# Patient Record
Sex: FEMALE | Race: OTHER | HISPANIC OR LATINO | Employment: OTHER | URBAN - METROPOLITAN AREA
[De-identification: names, ages, dates, MRNs, and addresses within clinical notes are randomized per-mention and may not be internally consistent; named-entity substitution may affect disease eponyms.]

---

## 2017-01-04 ENCOUNTER — HOSPITAL ENCOUNTER (OUTPATIENT)
Dept: WOUND CARE | Facility: HOSPITAL | Age: 67
Discharge: HOME | End: 2017-01-04
Attending: SURGERY
Payer: MEDICARE

## 2017-01-04 VITALS
TEMPERATURE: 98 F | HEIGHT: 62 IN | WEIGHT: 170 LBS | SYSTOLIC BLOOD PRESSURE: 137 MMHG | BODY MASS INDEX: 31.28 KG/M2 | DIASTOLIC BLOOD PRESSURE: 86 MMHG | HEART RATE: 101 BPM

## 2017-01-04 DIAGNOSIS — T14.8XXA DRAINAGE FROM WOUND: Primary | ICD-10-CM

## 2017-01-04 PROCEDURE — 99213 OFFICE O/P EST LOW 20 MIN: CPT

## 2017-01-04 RX ORDER — ONDANSETRON HYDROCHLORIDE 8 MG/1
8 TABLET, FILM COATED ORAL EVERY 8 HOURS PRN
Qty: 30 TABLET | Refills: 0 | Status: SHIPPED | OUTPATIENT
Start: 2017-01-04 | End: 2017-08-23 | Stop reason: SDUPTHER

## 2017-01-04 NOTE — PROGRESS NOTES
Much of the documentation for this visit was completed in the [facility] system. Please see the attached documentation for further details about this patient's care.     Ulisses Horton MD

## 2017-01-11 ENCOUNTER — HOSPITAL ENCOUNTER (OUTPATIENT)
Dept: WOUND CARE | Facility: HOSPITAL | Age: 67
Discharge: HOME | End: 2017-01-11
Attending: SURGERY
Payer: MEDICARE

## 2017-01-11 VITALS
WEIGHT: 170 LBS | HEIGHT: 62 IN | BODY MASS INDEX: 31.28 KG/M2 | SYSTOLIC BLOOD PRESSURE: 130 MMHG | DIASTOLIC BLOOD PRESSURE: 88 MMHG | HEART RATE: 98 BPM | TEMPERATURE: 97 F

## 2017-01-11 DIAGNOSIS — E11.628 DIABETES WITH SKIN COMPLICATION: ICD-10-CM

## 2017-01-11 DIAGNOSIS — L08.9 UNSPECIFIED LOCAL INFECTION OF SKIN AND SUBCUTANEOUS TISSUE: ICD-10-CM

## 2017-01-11 PROCEDURE — 99213 OFFICE O/P EST LOW 20 MIN: CPT

## 2017-01-11 RX ORDER — HYDROCODONE BITARTRATE AND ACETAMINOPHEN 5; 325 MG/1; MG/1
1 TABLET ORAL EVERY 6 HOURS PRN
Qty: 24 TABLET | Refills: 0 | Status: SHIPPED | OUTPATIENT
Start: 2017-01-11 | End: 2017-01-21

## 2017-01-11 RX ORDER — METHOCARBAMOL 750 MG/1
750 TABLET, FILM COATED ORAL 2 TIMES DAILY
Qty: 60 TABLET | Refills: 0 | Status: SHIPPED | OUTPATIENT
Start: 2017-01-11 | End: 2018-05-30

## 2017-01-18 ENCOUNTER — HOSPITAL ENCOUNTER (OUTPATIENT)
Dept: WOUND CARE | Facility: HOSPITAL | Age: 67
Discharge: HOME | End: 2017-01-18
Attending: SURGERY
Payer: MEDICARE

## 2017-01-18 VITALS
SYSTOLIC BLOOD PRESSURE: 130 MMHG | HEIGHT: 62 IN | BODY MASS INDEX: 31.28 KG/M2 | WEIGHT: 170 LBS | TEMPERATURE: 98 F | DIASTOLIC BLOOD PRESSURE: 79 MMHG | HEART RATE: 97 BPM

## 2017-01-18 DIAGNOSIS — E11.628 DIABETES WITH SKIN COMPLICATION: ICD-10-CM

## 2017-01-18 DIAGNOSIS — L08.9 UNSPECIFIED LOCAL INFECTION OF SKIN AND SUBCUTANEOUS TISSUE: ICD-10-CM

## 2017-01-18 PROCEDURE — 99213 OFFICE O/P EST LOW 20 MIN: CPT

## 2017-02-01 ENCOUNTER — HOSPITAL ENCOUNTER (OUTPATIENT)
Dept: WOUND CARE | Facility: HOSPITAL | Age: 67
Discharge: HOME | End: 2017-02-01
Attending: SURGERY
Payer: MEDICARE

## 2017-02-01 VITALS
TEMPERATURE: 98 F | HEIGHT: 62 IN | HEART RATE: 87 BPM | SYSTOLIC BLOOD PRESSURE: 146 MMHG | BODY MASS INDEX: 31.28 KG/M2 | DIASTOLIC BLOOD PRESSURE: 89 MMHG | WEIGHT: 170 LBS

## 2017-02-01 PROCEDURE — 99213 OFFICE O/P EST LOW 20 MIN: CPT

## 2017-02-01 NOTE — PROGRESS NOTES
Much of the documentation for this visit was completed in the [facility] system. Please see the attached documentation for further details about this patient's care.     Luna Blakely

## 2017-02-08 ENCOUNTER — HOSPITAL ENCOUNTER (OUTPATIENT)
Dept: WOUND CARE | Facility: HOSPITAL | Age: 67
Discharge: HOME | End: 2017-02-08
Attending: SURGERY
Payer: MEDICARE

## 2017-02-08 VITALS — DIASTOLIC BLOOD PRESSURE: 76 MMHG | TEMPERATURE: 98 F | SYSTOLIC BLOOD PRESSURE: 127 MMHG | HEART RATE: 97 BPM

## 2017-02-08 DIAGNOSIS — I10 ESSENTIAL HYPERTENSION: ICD-10-CM

## 2017-02-08 DIAGNOSIS — Z93.3 STATUS POST HARTMANN'S PROCEDURE: ICD-10-CM

## 2017-02-08 DIAGNOSIS — K63.2 ENTERO-ENTERIC FISTULA: ICD-10-CM

## 2017-02-08 DIAGNOSIS — R00.0 TACHYCARDIA: ICD-10-CM

## 2017-02-08 DIAGNOSIS — Z98.890 S/P EXPLORATORY LAPAROTOMY: ICD-10-CM

## 2017-02-08 DIAGNOSIS — Z93.3 S/P COLOSTOMY: ICD-10-CM

## 2017-02-08 DIAGNOSIS — T14.8XXA DRAINAGE FROM WOUND: ICD-10-CM

## 2017-02-08 DIAGNOSIS — T81.89XA SUTURE GRANULOMA, INITIAL ENCOUNTER: ICD-10-CM

## 2017-02-08 DIAGNOSIS — E11.628 DIABETES WITH SKIN COMPLICATION: ICD-10-CM

## 2017-02-08 DIAGNOSIS — E11.9 TYPE 2 DIABETES MELLITUS WITHOUT COMPLICATION, UNSPECIFIED LONG TERM INSULIN USE STATUS: ICD-10-CM

## 2017-02-08 DIAGNOSIS — Z90.49 HISTORY OF HEMICOLECTOMY: Primary | ICD-10-CM

## 2017-02-08 PROCEDURE — 99213 OFFICE O/P EST LOW 20 MIN: CPT

## 2017-02-15 ENCOUNTER — HOSPITAL ENCOUNTER (OUTPATIENT)
Dept: WOUND CARE | Facility: HOSPITAL | Age: 67
Discharge: HOME | End: 2017-02-15
Attending: SURGERY
Payer: MEDICARE

## 2017-02-15 VITALS
HEART RATE: 91 BPM | TEMPERATURE: 98 F | WEIGHT: 170 LBS | HEIGHT: 62 IN | SYSTOLIC BLOOD PRESSURE: 144 MMHG | DIASTOLIC BLOOD PRESSURE: 85 MMHG | BODY MASS INDEX: 31.28 KG/M2

## 2017-02-15 DIAGNOSIS — E11.628 TYPE 2 DIABETES MELLITUS WITH OTHER SKIN COMPLICATION, UNSPECIFIED LONG TERM INSULIN USE STATUS: ICD-10-CM

## 2017-02-15 PROCEDURE — 99213 OFFICE O/P EST LOW 20 MIN: CPT

## 2017-02-20 ENCOUNTER — HOSPITAL ENCOUNTER (OUTPATIENT)
Dept: RADIOLOGY | Facility: HOSPITAL | Age: 67
Discharge: HOME | End: 2017-02-20
Attending: INTERNAL MEDICINE
Payer: MEDICARE

## 2017-02-20 DIAGNOSIS — M71.571: ICD-10-CM

## 2017-02-20 PROCEDURE — 73610 X-RAY EXAM OF ANKLE: CPT | Mod: TC,LT

## 2017-02-20 PROCEDURE — 73610 X-RAY EXAM OF ANKLE: CPT | Mod: 26,LT,, | Performed by: RADIOLOGY

## 2017-03-02 ENCOUNTER — HOSPITAL ENCOUNTER (OUTPATIENT)
Dept: WOUND CARE | Facility: HOSPITAL | Age: 67
Discharge: HOME | End: 2017-03-02
Attending: SURGERY
Payer: MEDICARE

## 2017-03-02 VITALS
SYSTOLIC BLOOD PRESSURE: 149 MMHG | HEIGHT: 62 IN | DIASTOLIC BLOOD PRESSURE: 82 MMHG | TEMPERATURE: 98 F | WEIGHT: 170 LBS | HEART RATE: 86 BPM | BODY MASS INDEX: 31.28 KG/M2

## 2017-03-02 DIAGNOSIS — L08.9 INFECTION OF SKIN AND SUBCUTANEOUS TISSUE: Primary | ICD-10-CM

## 2017-03-02 PROCEDURE — 99213 OFFICE O/P EST LOW 20 MIN: CPT

## 2017-03-06 ENCOUNTER — HOSPITAL ENCOUNTER (OUTPATIENT)
Dept: WOUND CARE | Facility: HOSPITAL | Age: 67
Discharge: HOME | End: 2017-03-06
Attending: SURGERY
Payer: MEDICARE

## 2017-03-06 VITALS
WEIGHT: 170 LBS | HEIGHT: 62 IN | SYSTOLIC BLOOD PRESSURE: 140 MMHG | BODY MASS INDEX: 31.28 KG/M2 | DIASTOLIC BLOOD PRESSURE: 82 MMHG | TEMPERATURE: 98 F | HEART RATE: 95 BPM

## 2017-03-06 DIAGNOSIS — Z98.890 S/P EXPLORATORY LAPAROTOMY: ICD-10-CM

## 2017-03-06 DIAGNOSIS — K63.2 ENTERO-ENTERIC FISTULA: ICD-10-CM

## 2017-03-06 DIAGNOSIS — I10 ESSENTIAL HYPERTENSION: ICD-10-CM

## 2017-03-06 DIAGNOSIS — Z93.3 STATUS POST HARTMANN'S PROCEDURE: ICD-10-CM

## 2017-03-06 DIAGNOSIS — R00.0 TACHYCARDIA: Primary | ICD-10-CM

## 2017-03-06 DIAGNOSIS — E11.9 TYPE 2 DIABETES MELLITUS WITHOUT COMPLICATION, UNSPECIFIED LONG TERM INSULIN USE STATUS: ICD-10-CM

## 2017-03-06 DIAGNOSIS — T14.8XXA DRAINAGE FROM WOUND: ICD-10-CM

## 2017-03-06 DIAGNOSIS — T81.89XA SUTURE GRANULOMA, INITIAL ENCOUNTER: ICD-10-CM

## 2017-03-06 DIAGNOSIS — Z90.49 HISTORY OF HEMICOLECTOMY: ICD-10-CM

## 2017-03-06 DIAGNOSIS — Z93.3 S/P COLOSTOMY: ICD-10-CM

## 2017-03-06 PROCEDURE — 87070 CULTURE OTHR SPECIMN AEROBIC: CPT

## 2017-03-06 PROCEDURE — 87075 CULTR BACTERIA EXCEPT BLOOD: CPT

## 2017-03-06 PROCEDURE — 99213 OFFICE O/P EST LOW 20 MIN: CPT

## 2017-03-06 NOTE — PATIENT INSTRUCTIONS
Clean wound with saline, wipe with gauze, apply strip of Cutamed Sorbact to wound, cover with border dressing, every other day. If needed apply small piece gauze over wound under border dressing.

## 2017-03-08 LAB — BACTERIA SPEC AEROBE CULT: NORMAL

## 2017-03-10 LAB — BACTERIA SPEC ANAEROBE CULT: NORMAL

## 2017-06-29 ENCOUNTER — HOSPITAL ENCOUNTER (OUTPATIENT)
Dept: WOUND CARE | Facility: HOSPITAL | Age: 67
Discharge: HOME | End: 2017-06-29
Attending: SURGERY
Payer: MEDICARE

## 2017-06-29 VITALS
WEIGHT: 170 LBS | TEMPERATURE: 98 F | SYSTOLIC BLOOD PRESSURE: 181 MMHG | HEIGHT: 62 IN | HEART RATE: 94 BPM | BODY MASS INDEX: 31.28 KG/M2 | DIASTOLIC BLOOD PRESSURE: 104 MMHG

## 2017-06-29 DIAGNOSIS — R00.0 TACHYCARDIA: Primary | ICD-10-CM

## 2017-06-29 DIAGNOSIS — Z98.890 S/P EXPLORATORY LAPAROTOMY: ICD-10-CM

## 2017-06-29 DIAGNOSIS — I10 ESSENTIAL HYPERTENSION: ICD-10-CM

## 2017-06-29 DIAGNOSIS — Z93.3 STATUS POST HARTMANN'S PROCEDURE: ICD-10-CM

## 2017-06-29 DIAGNOSIS — Z90.49 HISTORY OF HEMICOLECTOMY: ICD-10-CM

## 2017-06-29 DIAGNOSIS — K63.2 ENTERO-ENTERIC FISTULA: ICD-10-CM

## 2017-06-29 DIAGNOSIS — T14.8XXA DRAINAGE FROM WOUND: ICD-10-CM

## 2017-06-29 PROCEDURE — 87186 SC STD MICRODIL/AGAR DIL: CPT

## 2017-06-29 PROCEDURE — 87070 CULTURE OTHR SPECIMN AEROBIC: CPT

## 2017-06-29 PROCEDURE — 99214 OFFICE O/P EST MOD 30 MIN: CPT

## 2017-06-29 PROCEDURE — 87077 CULTURE AEROBIC IDENTIFY: CPT

## 2017-06-29 RX ORDER — CLINDAMYCIN HYDROCHLORIDE 300 MG/1
300 CAPSULE ORAL 3 TIMES DAILY
Qty: 60 CAPSULE | Refills: 1 | Status: SHIPPED | OUTPATIENT
Start: 2017-06-29 | End: 2017-08-30 | Stop reason: ALTCHOICE

## 2017-06-29 NOTE — PROGRESS NOTES
Much of the documentation for this visit was completed in the [facility] system. Please see the attached documentation for further details about this patient's care.     Fabiana Quintero RN

## 2017-07-03 LAB
BACTERIA SPEC AEROBE CULT: NORMAL
BACTERIA SPEC AEROBE CULT: NORMAL

## 2017-07-06 ENCOUNTER — HOSPITAL ENCOUNTER (OUTPATIENT)
Dept: WOUND CARE | Facility: HOSPITAL | Age: 67
Discharge: HOME | End: 2017-07-06
Attending: SURGERY
Payer: MEDICARE

## 2017-07-06 VITALS — SYSTOLIC BLOOD PRESSURE: 187 MMHG | HEART RATE: 85 BPM | TEMPERATURE: 98 F | DIASTOLIC BLOOD PRESSURE: 99 MMHG

## 2017-07-06 DIAGNOSIS — E11.9 TYPE 2 DIABETES MELLITUS WITHOUT COMPLICATION, UNSPECIFIED LONG TERM INSULIN USE STATUS: ICD-10-CM

## 2017-07-06 DIAGNOSIS — I10 ESSENTIAL HYPERTENSION: ICD-10-CM

## 2017-07-06 DIAGNOSIS — T81.89XA SUTURE GRANULOMA, INITIAL ENCOUNTER: Primary | ICD-10-CM

## 2017-07-06 DIAGNOSIS — L02.211 ABSCESS OF ABDOMINAL WALL: ICD-10-CM

## 2017-07-06 DIAGNOSIS — T14.8XXA DRAINAGE FROM WOUND: ICD-10-CM

## 2017-07-06 DIAGNOSIS — Z93.3 S/P COLOSTOMY: ICD-10-CM

## 2017-07-06 PROCEDURE — 87077 CULTURE AEROBIC IDENTIFY: CPT

## 2017-07-06 PROCEDURE — 87070 CULTURE OTHR SPECIMN AEROBIC: CPT

## 2017-07-06 PROCEDURE — 87075 CULTR BACTERIA EXCEPT BLOOD: CPT

## 2017-07-06 PROCEDURE — 99214 OFFICE O/P EST MOD 30 MIN: CPT

## 2017-07-06 PROCEDURE — 87186 SC STD MICRODIL/AGAR DIL: CPT | Mod: 59

## 2017-07-06 PROCEDURE — 87076 CULTURE ANAEROBE IDENT EACH: CPT

## 2017-07-06 RX ORDER — SODIUM CHLORIDE 9 MG/ML
INJECTION, SOLUTION INTRAVENOUS CONTINUOUS
Status: CANCELLED | OUTPATIENT
Start: 2017-07-06

## 2017-07-06 RX ORDER — CEFAZOLIN SODIUM 2 G/50ML
2 SOLUTION INTRAVENOUS
Status: CANCELLED | OUTPATIENT
Start: 2017-07-06

## 2017-07-06 NOTE — PROGRESS NOTES
Today`s Date: 2017     Admit Date: 2017    Admitting Physician: Ulisses Horton MD    Patient`s Name: Azucena Morrison , 67 y.o. female    HISTORY AND CHIEF COMPLAINT  C/o painful tender swelling lower abdominal wall with drainage  With mild fever , no nausea or vomiting, moving or diarrhea  Patient Active Problem List   Diagnosis    History of hemicolectomy    Status post Aiyana's procedure    HTN (hypertension)    Type 2 diabetes mellitus without complication    Suture granuloma    Essential hypertension    Drainage from wound    S/P colostomy    Entero-enteric fistula    S/P exploratory laparotomy    Tachycardia       Past Medical History:   Diagnosis Date    Diabetes mellitus     Hyperlipemia     Hypertension        Past Surgical History:   Procedure Laterality Date     SECTION, CLASSIC      CHOLECYSTECTOMY      COLON SURGERY      \[facility]\""    COLOSTOMY Left     INCISIONAL HERNIA REPAIR Right 2010       Prior to Admission medications    Medication Sig Start Date End Date Taking? Authorizing Provider   acetaminophen (TYLENOL) 325 MG tablet Take 2 tablets (650 mg total) by mouth every 8 (eight) hours as needed. 16   Ulisses Horton MD   amlodipine (NORVASC) 10 MG tablet  16   Historical Provider, MD   clindamycin (CLEOCIN) 300 MG capsule Take 1 capsule (300 mg total) by mouth 3 (three) times daily. 17   Ulisses Horton MD   glyBURIDE (DIABETA) 5 MG tablet  16   Historical Provider, MD   metformin (GLUCOPHAGE) 1000 MG tablet  16   Historical Provider, MD   metformin (GLUCOPHAGE) 500 MG tablet Take 1,000 mg by mouth 2 (two) times daily with meals.    Historical Provider, MD   methocarbamol (ROBAXIN) 750 MG Tab Take 1 tablet (750 mg total) by mouth 2 (two) times daily. 17   Ulisses Horton MD   mupirocin (BACTROBAN) 2 % ointment  3/29/16   Historical Provider, MD   ondansetron (ZOFRAN) 8 MG tablet Take 1 tablet (8 mg  total) by mouth every 8 (eight) hours as needed for Nausea. 1/4/17   Ulisses Horton MD   pravastatin (PRAVACHOL) 20 MG tablet Take 1 tablet (20 mg total) by mouth once daily. 11/28/16 11/28/17  Ulisses Horton MD   TRUEPLUS LANCETS 26 gauge Misc  4/7/16   Historical Provider, MD   TRUETEST TEST STRIPS Strp  4/7/16   Historical Provider, MD   zolpidem (AMBIEN) 5 MG Tab Take 1 tablet (5 mg total) by mouth nightly as needed. 12/21/16 6/29/17  Ulisses Horton MD     Current Outpatient Prescriptions on File Prior to Encounter   Medication Sig Dispense Refill    acetaminophen (TYLENOL) 325 MG tablet Take 2 tablets (650 mg total) by mouth every 8 (eight) hours as needed.  0    amlodipine (NORVASC) 10 MG tablet       clindamycin (CLEOCIN) 300 MG capsule Take 1 capsule (300 mg total) by mouth 3 (three) times daily. 60 capsule 1    glyBURIDE (DIABETA) 5 MG tablet       metformin (GLUCOPHAGE) 1000 MG tablet       metformin (GLUCOPHAGE) 500 MG tablet Take 1,000 mg by mouth 2 (two) times daily with meals.      methocarbamol (ROBAXIN) 750 MG Tab Take 1 tablet (750 mg total) by mouth 2 (two) times daily. 60 tablet 0    mupirocin (BACTROBAN) 2 % ointment       ondansetron (ZOFRAN) 8 MG tablet Take 1 tablet (8 mg total) by mouth every 8 (eight) hours as needed for Nausea. 30 tablet 0    pravastatin (PRAVACHOL) 20 MG tablet Take 1 tablet (20 mg total) by mouth once daily. 90 tablet 3    TRUEPLUS LANCETS 26 gauge Misc       TRUETEST TEST STRIPS Strp       zolpidem (AMBIEN) 5 MG Tab Take 1 tablet (5 mg total) by mouth nightly as needed. 30 tablet 0     No current facility-administered medications on file prior to encounter.         Review of patient's allergies indicates:   Allergen Reactions    Chlorhexidine gluconate Rash     Chlorhexidine/alcohol wipes. Rash and blistering.       Social History:   reports that she has never smoked. She has never used smokeless tobacco. She reports that she does not  drink alcohol or use drugs.     No family history on file.    PHYSICAL EXAMINATION  Temp:  [97.7 °F (36.5 °C)] 97.7 °F (36.5 °C)  Pulse:  [85] 85  BP: (187)/(99) 187/99    General Condition:   alert x 3    Head & Neck  Anemia: None  Jaundice: None  Neck vein: Not distended  Carotid Bruits: none  Lymph nodes: none palpable  Thyroid: normal    Chest: normal    Heart: normal    Rt. Breast: not examined  Lt. Breast: not examined  Axillary lymph nodes: none    Abdomen: Soft,  None tender with no palpable mass or organ  Hernia: none    Rectal: Defered    Extremities: normal    Vascular: normal    Specific focus Examination    Imp: abdominal wall abscess, dm , htn, s/p colon resection    Plan: excision and debridement abdominal wound.

## 2017-07-08 LAB — BACTERIA SPEC AEROBE CULT: NORMAL

## 2017-07-10 LAB — BACTERIA SPEC ANAEROBE CULT: NORMAL

## 2017-07-11 ENCOUNTER — HOSPITAL ENCOUNTER (OUTPATIENT)
Facility: HOSPITAL | Age: 67
Discharge: HOME | End: 2017-07-11
Attending: SURGERY | Admitting: SURGERY
Payer: MEDICARE

## 2017-07-11 ENCOUNTER — ANESTHESIA EVENT (OUTPATIENT)
Dept: SURGERY | Facility: HOSPITAL | Age: 67
End: 2017-07-11
Payer: MEDICARE

## 2017-07-11 ENCOUNTER — ANESTHESIA (OUTPATIENT)
Dept: SURGERY | Facility: HOSPITAL | Age: 67
End: 2017-07-11
Payer: MEDICARE

## 2017-07-11 VITALS
OXYGEN SATURATION: 98 % | HEIGHT: 62 IN | SYSTOLIC BLOOD PRESSURE: 140 MMHG | RESPIRATION RATE: 16 BRPM | DIASTOLIC BLOOD PRESSURE: 77 MMHG | TEMPERATURE: 98 F | WEIGHT: 169 LBS | BODY MASS INDEX: 31.1 KG/M2 | HEART RATE: 88 BPM

## 2017-07-11 VITALS — HEART RATE: 85 BPM | DIASTOLIC BLOOD PRESSURE: 76 MMHG | OXYGEN SATURATION: 100 % | SYSTOLIC BLOOD PRESSURE: 147 MMHG

## 2017-07-11 DIAGNOSIS — Z90.49 HISTORY OF HEMICOLECTOMY: Primary | ICD-10-CM

## 2017-07-11 DIAGNOSIS — L02.211 ABSCESS OF ABDOMINAL WALL: ICD-10-CM

## 2017-07-11 DIAGNOSIS — I10 HYPERTENSION: ICD-10-CM

## 2017-07-11 DIAGNOSIS — I10 ESSENTIAL HYPERTENSION: ICD-10-CM

## 2017-07-11 DIAGNOSIS — Z93.3 STATUS POST HARTMANN'S PROCEDURE: ICD-10-CM

## 2017-07-11 LAB
ANION GAP SERPL CALC-SCNC: 8 MMOL/L
BASOPHILS # BLD AUTO: 0.02 K/UL
BASOPHILS NFR BLD: 0.2 %
BUN SERPL-MCNC: 13 MG/DL
CALCIUM SERPL-MCNC: 9.6 MG/DL
CHLORIDE SERPL-SCNC: 101 MMOL/L
CO2 SERPL-SCNC: 27 MMOL/L
CREAT SERPL-MCNC: 0.9 MG/DL
DIFFERENTIAL METHOD: ABNORMAL
EOSINOPHIL # BLD AUTO: 0.3 K/UL
EOSINOPHIL NFR BLD: 2.7 %
ERYTHROCYTE [DISTWIDTH] IN BLOOD BY AUTOMATED COUNT: 21.6 %
EST. GFR  (AFRICAN AMERICAN): >60 ML/MIN/1.73 M^2
EST. GFR  (NON AFRICAN AMERICAN): >60 ML/MIN/1.73 M^2
GLUCOSE SERPL-MCNC: 128 MG/DL
HCT VFR BLD AUTO: 35.1 %
HGB BLD-MCNC: 11.3 G/DL
LYMPHOCYTES # BLD AUTO: 2.3 K/UL
LYMPHOCYTES NFR BLD: 19.5 %
MCH RBC QN AUTO: 23.3 PG
MCHC RBC AUTO-ENTMCNC: 32.2 %
MCV RBC AUTO: 72 FL
MONOCYTES # BLD AUTO: 0.4 K/UL
MONOCYTES NFR BLD: 3.8 %
NEUTROPHILS # BLD AUTO: 8.5 K/UL
NEUTROPHILS NFR BLD: 73.5 %
PLATELET # BLD AUTO: 393 K/UL
PMV BLD AUTO: 8 FL
POTASSIUM SERPL-SCNC: 4.5 MMOL/L
RBC # BLD AUTO: 4.85 M/UL
SODIUM SERPL-SCNC: 136 MMOL/L
WBC # BLD AUTO: 11.53 K/UL

## 2017-07-11 PROCEDURE — 87102 FUNGUS ISOLATION CULTURE: CPT

## 2017-07-11 PROCEDURE — 87116 MYCOBACTERIA CULTURE: CPT

## 2017-07-11 PROCEDURE — 87077 CULTURE AEROBIC IDENTIFY: CPT

## 2017-07-11 PROCEDURE — 36000707: Performed by: SURGERY

## 2017-07-11 PROCEDURE — 36415 COLL VENOUS BLD VENIPUNCTURE: CPT

## 2017-07-11 PROCEDURE — 71000016 HC POSTOP RECOV ADDL HR: Performed by: SURGERY

## 2017-07-11 PROCEDURE — 93010 ELECTROCARDIOGRAM REPORT: CPT | Mod: ,,, | Performed by: INTERNAL MEDICINE

## 2017-07-11 PROCEDURE — 80048 BASIC METABOLIC PNL TOTAL CA: CPT

## 2017-07-11 PROCEDURE — 93005 ELECTROCARDIOGRAM TRACING: CPT

## 2017-07-11 PROCEDURE — 37000009 HC ANESTHESIA EA ADD 15 MINS: Performed by: SURGERY

## 2017-07-11 PROCEDURE — 87186 SC STD MICRODIL/AGAR DIL: CPT | Mod: 59

## 2017-07-11 PROCEDURE — 87205 SMEAR GRAM STAIN: CPT

## 2017-07-11 PROCEDURE — 63600175 PHARM REV CODE 636 W HCPCS: Performed by: NURSE ANESTHETIST, CERTIFIED REGISTERED

## 2017-07-11 PROCEDURE — 85025 COMPLETE CBC W/AUTO DIFF WBC: CPT

## 2017-07-11 PROCEDURE — 25000003 PHARM REV CODE 250: Performed by: NURSE ANESTHETIST, CERTIFIED REGISTERED

## 2017-07-11 PROCEDURE — 87070 CULTURE OTHR SPECIMN AEROBIC: CPT

## 2017-07-11 PROCEDURE — 87075 CULTR BACTERIA EXCEPT BLOOD: CPT

## 2017-07-11 PROCEDURE — 25000003 PHARM REV CODE 250: Performed by: ANESTHESIOLOGY

## 2017-07-11 PROCEDURE — 25000003 PHARM REV CODE 250: Performed by: SURGERY

## 2017-07-11 PROCEDURE — 37000008 HC ANESTHESIA 1ST 15 MINUTES: Performed by: SURGERY

## 2017-07-11 PROCEDURE — 71000015 HC POSTOP RECOV 1ST HR: Performed by: SURGERY

## 2017-07-11 PROCEDURE — 36000706: Performed by: SURGERY

## 2017-07-11 RX ORDER — MIDAZOLAM HYDROCHLORIDE 1 MG/ML
INJECTION INTRAMUSCULAR; INTRAVENOUS
Status: DISCONTINUED | OUTPATIENT
Start: 2017-07-11 | End: 2017-07-11

## 2017-07-11 RX ORDER — ACETAMINOPHEN 325 MG/1
650 TABLET ORAL EVERY 4 HOURS PRN
Status: DISCONTINUED | OUTPATIENT
Start: 2017-07-11 | End: 2017-07-11 | Stop reason: HOSPADM

## 2017-07-11 RX ORDER — BACITRACIN 50000 [IU]/1
INJECTION, POWDER, FOR SOLUTION INTRAMUSCULAR
Status: DISCONTINUED | OUTPATIENT
Start: 2017-07-11 | End: 2017-07-11 | Stop reason: HOSPADM

## 2017-07-11 RX ORDER — OXYCODONE HYDROCHLORIDE 5 MG/1
5 TABLET ORAL
Status: DISCONTINUED | OUTPATIENT
Start: 2017-07-11 | End: 2017-07-11 | Stop reason: HOSPADM

## 2017-07-11 RX ORDER — HYDROCODONE BITARTRATE AND ACETAMINOPHEN 5; 325 MG/1; MG/1
1 TABLET ORAL EVERY 4 HOURS PRN
Qty: 32 TABLET | Refills: 0 | Status: SHIPPED | OUTPATIENT
Start: 2017-07-11 | End: 2017-07-19 | Stop reason: SDUPTHER

## 2017-07-11 RX ORDER — HYDROCODONE BITARTRATE AND ACETAMINOPHEN 5; 325 MG/1; MG/1
1 TABLET ORAL EVERY 4 HOURS PRN
Status: DISCONTINUED | OUTPATIENT
Start: 2017-07-11 | End: 2017-07-11 | Stop reason: HOSPADM

## 2017-07-11 RX ORDER — FENTANYL CITRATE 50 UG/ML
INJECTION, SOLUTION INTRAMUSCULAR; INTRAVENOUS
Status: DISCONTINUED | OUTPATIENT
Start: 2017-07-11 | End: 2017-07-11

## 2017-07-11 RX ORDER — ONDANSETRON 2 MG/ML
4 INJECTION INTRAMUSCULAR; INTRAVENOUS DAILY PRN
Status: DISCONTINUED | OUTPATIENT
Start: 2017-07-11 | End: 2017-07-11 | Stop reason: HOSPADM

## 2017-07-11 RX ORDER — DIPHENHYDRAMINE HYDROCHLORIDE 50 MG/ML
25 INJECTION INTRAMUSCULAR; INTRAVENOUS EVERY 6 HOURS PRN
Status: DISCONTINUED | OUTPATIENT
Start: 2017-07-11 | End: 2017-07-11 | Stop reason: HOSPADM

## 2017-07-11 RX ORDER — CEFAZOLIN SODIUM 2 G/50ML
2 SOLUTION INTRAVENOUS
Status: DISCONTINUED | OUTPATIENT
Start: 2017-07-11 | End: 2017-07-11 | Stop reason: HOSPADM

## 2017-07-11 RX ORDER — HYDROMORPHONE HYDROCHLORIDE 2 MG/ML
0.5 INJECTION, SOLUTION INTRAMUSCULAR; INTRAVENOUS; SUBCUTANEOUS EVERY 5 MIN PRN
Status: DISCONTINUED | OUTPATIENT
Start: 2017-07-11 | End: 2017-07-11 | Stop reason: HOSPADM

## 2017-07-11 RX ORDER — LISINOPRIL 10 MG/1
10 TABLET ORAL DAILY
COMMUNITY
End: 2018-01-03 | Stop reason: ALTCHOICE

## 2017-07-11 RX ORDER — PROPOFOL 10 MG/ML
VIAL (ML) INTRAVENOUS CONTINUOUS PRN
Status: DISCONTINUED | OUTPATIENT
Start: 2017-07-11 | End: 2017-07-11

## 2017-07-11 RX ORDER — LISINOPRIL 10 MG/1
10 TABLET ORAL ONCE
Status: COMPLETED | OUTPATIENT
Start: 2017-07-11 | End: 2017-07-11

## 2017-07-11 RX ORDER — LIDOCAINE HYDROCHLORIDE 10 MG/ML
INJECTION, SOLUTION EPIDURAL; INFILTRATION; INTRACAUDAL; PERINEURAL
Status: DISCONTINUED | OUTPATIENT
Start: 2017-07-11 | End: 2017-07-11 | Stop reason: HOSPADM

## 2017-07-11 RX ORDER — SODIUM CHLORIDE 0.9 % (FLUSH) 0.9 %
3 SYRINGE (ML) INJECTION
Status: DISCONTINUED | OUTPATIENT
Start: 2017-07-11 | End: 2017-07-11 | Stop reason: HOSPADM

## 2017-07-11 RX ORDER — LIDOCAINE HCL/PF 100 MG/5ML
SYRINGE (ML) INTRAVENOUS
Status: DISCONTINUED | OUTPATIENT
Start: 2017-07-11 | End: 2017-07-11

## 2017-07-11 RX ORDER — SODIUM CHLORIDE, SODIUM LACTATE, POTASSIUM CHLORIDE, CALCIUM CHLORIDE 600; 310; 30; 20 MG/100ML; MG/100ML; MG/100ML; MG/100ML
INJECTION, SOLUTION INTRAVENOUS CONTINUOUS PRN
Status: DISCONTINUED | OUTPATIENT
Start: 2017-07-11 | End: 2017-07-11

## 2017-07-11 RX ORDER — HYDROCODONE BITARTRATE AND ACETAMINOPHEN 10; 325 MG/1; MG/1
1 TABLET ORAL EVERY 4 HOURS PRN
Status: DISCONTINUED | OUTPATIENT
Start: 2017-07-11 | End: 2017-07-11 | Stop reason: HOSPADM

## 2017-07-11 RX ORDER — SODIUM CHLORIDE 9 MG/ML
INJECTION, SOLUTION INTRAVENOUS CONTINUOUS
Status: DISCONTINUED | OUTPATIENT
Start: 2017-07-11 | End: 2017-07-11 | Stop reason: HOSPADM

## 2017-07-11 RX ORDER — ZOLPIDEM TARTRATE 5 MG/1
5 TABLET ORAL NIGHTLY PRN
Status: DISCONTINUED | OUTPATIENT
Start: 2017-07-11 | End: 2017-07-11 | Stop reason: HOSPADM

## 2017-07-11 RX ADMIN — MIDAZOLAM HYDROCHLORIDE 2 MG: 1 INJECTION, SOLUTION INTRAMUSCULAR; INTRAVENOUS at 01:07

## 2017-07-11 RX ADMIN — PROPOFOL 125 MCG/KG/MIN: 10 INJECTION, EMULSION INTRAVENOUS at 02:07

## 2017-07-11 RX ADMIN — DEXTROSE 2 G: 50 INJECTION, SOLUTION INTRAVENOUS at 02:07

## 2017-07-11 RX ADMIN — FENTANYL CITRATE 50 MCG: 50 INJECTION, SOLUTION INTRAMUSCULAR; INTRAVENOUS at 02:07

## 2017-07-11 RX ADMIN — LIDOCAINE HYDROCHLORIDE 50 MG: 20 INJECTION, SOLUTION INTRAVENOUS at 02:07

## 2017-07-11 RX ADMIN — LISINOPRIL 10 MG: 10 TABLET ORAL at 11:07

## 2017-07-11 RX ADMIN — SODIUM CHLORIDE, SODIUM LACTATE, POTASSIUM CHLORIDE, AND CALCIUM CHLORIDE: .6; .31; .03; .02 INJECTION, SOLUTION INTRAVENOUS at 01:07

## 2017-07-11 NOTE — TRANSFER OF CARE
"Anesthesia Transfer of Care Note    Patient: Azucena Morrison    Procedure(s) Performed: Procedure(s) (LRB):  DEBRIDEMENT-WOUND-ABDOMINAL WALL (N/A)    Patient location: OPS    Anesthesia Type: MAC    Transport from OR: Transported from OR on room air with adequate spontaneous ventilation    Post pain: adequate analgesia    Post assessment: no apparent anesthetic complications    Post vital signs: stable    Level of consciousness: awake    Nausea/Vomiting: no nausea/vomiting    Complications: none          Last vitals:   Visit Vitals  BP (!) (P) 174/96 (BP Location: Right arm)   Pulse 85   Temp 36.6 °C (97.9 °F) (Oral)   Resp 18   Ht 5' 2" (1.575 m)   Wt 76.7 kg (169 lb)   SpO2 99%   BMI 30.91 kg/m²     "

## 2017-07-11 NOTE — ANESTHESIA PREPROCEDURE EVALUATION
2017  Azucena Morrison is a 67 y.o., femalet of an abdominal wound    Review of patient's allergies indicates:   Allergen Reactions    Chlorhexidine gluconate Rash     Chlorhexidine/alcohol wipes. Rash and blistering.     Patient Active Problem List   Diagnosis    History of hemicolectomy    Status post Aiyana's procedure    HTN (hypertension)    Type 2 diabetes mellitus without complication    Suture granuloma    Essential hypertension    Drainage from wound    S/P colostomy    Entero-enteric fistula    S/P exploratory laparotomy    Tachycardia    Abscess of abdominal wall     Past Medical History:   Diagnosis Date    Diabetes mellitus     Hyperlipemia     Hypertension      Past Surgical History:   Procedure Laterality Date     SECTION, CLASSIC      CHOLECYSTECTOMY      [facility]    COLOSTOMY Left 2015    INCISIONAL HERNIA REPAIR Right 2010         Anesthesia Evaluation         Review of Systems    Wt Readings from Last 3 Encounters:   17 76.7 kg (169 lb)   17 77.1 kg (170 lb)   17 77.1 kg (170 lb)     Temp Readings from Last 3 Encounters:   17 36.6 °C (97.9 °F) (Oral)   17 36.5 °C (97.7 °F)   17 36.8 °C (98.3 °F) (Oral)     BP Readings from Last 3 Encounters:   17 (!) 173/105   17 (!) 187/99   17 (!) 181/104     Pulse Readings from Last 3 Encounters:   17 85   17 85   17 94     Lab Results   Component Value Date    WBC 12.25 2016    HGB 10.0 (L) 2016    HCT 30.8 (L) 2016    MCV 82 2016     (H) 2016       Chemistry        Component Value Date/Time     2016 1250    K 4.1 2016 1250     2016 1250    CO2 23 2016 1250    BUN 10 2016 1250    CREATININE 0.9 2016 1250     (H) 2016 1250         Component Value Date/Time    CALCIUM 9.6 12/07/2016 1250    ALKPHOS 58 11/16/2016 2044    AST 13 11/16/2016 2044    ALT 6 (L) 11/16/2016 2044    BILITOT 0.6 11/16/2016 2044    ESTGFRAFRICA >60 12/07/2016 1250    EGFRNONAA >60 12/07/2016 1250            Physical Exam  General:  Well nourished, Obesity    Airway/Jaw/Neck:  Airway Findings: Mouth Opening: Normal Tongue: Normal  General Airway Assessment: Adult  Mallampati: II  Improves to II with phonation.  TM Distance: Normal, at least 6 cm       Chest/Lungs:  Chest/Lungs Findings: Clear to auscultation, Normal Respiratory Rate     Heart/Vascular:  Heart Findings: Rate: Normal  Rhythm: Regular Rhythm  Sounds: Normal        Mental Status:  Mental Status Findings:  Cooperative, Alert and Oriented         Anesthesia Plan  Type of Anesthesia, risks & benefits discussed:  Anesthesia Type:  general  Patient's Preference:   Intra-op Monitoring Plan:   Intra-op Monitoring Plan Comments:   Post Op Pain Control Plan:   Post Op Pain Control Plan Comments:   Induction:   IV  Beta Blocker:  Patient is not currently on a Beta-Blocker (No further documentation required).       Informed Consent: Patient understands risks and agrees with Anesthesia plan.  Questions answered. Anesthesia consent signed with patient.  ASA Score: 3     Day of Surgery Review of History & Physical: I have interviewed and examined the patient. I have reviewed the patient's H&P dated:  There are no significant changes.  H&P update referred to the provider.  H&P completed by Anesthesiologist.       Ready For Surgery From Anesthesia Perspective.

## 2017-07-11 NOTE — BRIEF OP NOTE
[facility]  Brief Operative Note     SUMMARY     Surgery Date: 7/11/2017     Surgeon(s) and Role:     * Ulisses Horton MD - Primary    Assisting Surgeon: None    Pre-op Diagnosis:  Abscess of abdominal wall [L02.211]  DM (diabetes mellitus) type I uncontrolled, periph vascular disorder [E10.51, E10.65]  Essential hypertension [I10]    Post-op Diagnosis:  Post-Op Diagnosis Codes:     * Abscess of abdominal wall [L02.211]     * DM (diabetes mellitus) type I uncontrolled, periph vascular disorder [E10.51, E10.65]     * Essential hypertension [I10]    Procedure(s) (LRB):  DEBRIDEMENT-WOUND-ABDOMINAL WALL (N/A)    Anesthesia: General    Description of the findings of the procedure: excision and debridement abdominal wound    Findings/Key Components:   Abscess abdominal; wall  Estimated Blood Loss: 50 cc      Specimens:   Specimen (12h ago through future)    None          Discharge Note    SUMMARY     Admit Date: 7/11/2017    Discharge Date and Time:  07/11/2017 2:23 PM    Hospital Course (synopsis of major diagnoses, care, treatment, and services provided during the course of the hospital stay): patient tolerated procedure well amd was snet to reconvery room in stable cond.     Final Diagnosis: Post-Op Diagnosis Codes:     * Abscess of abdominal wall [L02.211]     * DM (diabetes mellitus) type I uncontrolled, periph vascular disorder [E10.51, E10.65]     * Essential hypertension [I10]    Disposition: Home or Self Care    Follow Up/Patient Instructions:     Medications:  Reconciled Home Medications:   Current Discharge Medication List      START taking these medications    Details   hydrocodone-acetaminophen 5-325mg (NORCO) 5-325 mg per tablet Take 1 tablet by mouth every 4 (four) hours as needed for Pain.  Qty: 32 tablet, Refills: 0         CONTINUE these medications which have NOT CHANGED    Details   acetaminophen (TYLENOL) 325 MG tablet Take 2 tablets (650 mg total) by mouth every 8 (eight)  hours as needed.  Refills: 0      clindamycin (CLEOCIN) 300 MG capsule Take 1 capsule (300 mg total) by mouth 3 (three) times daily.  Qty: 60 capsule, Refills: 1      glyBURIDE (DIABETA) 5 MG tablet       lisinopril 10 MG tablet Take 10 mg by mouth once daily.      !! metformin (GLUCOPHAGE) 1000 MG tablet       !! metformin (GLUCOPHAGE) 500 MG tablet Take 1,000 mg by mouth 2 (two) times daily with meals.      methocarbamol (ROBAXIN) 750 MG Tab Take 1 tablet (750 mg total) by mouth 2 (two) times daily.  Qty: 60 tablet, Refills: 0      mupirocin (BACTROBAN) 2 % ointment       ondansetron (ZOFRAN) 8 MG tablet Take 1 tablet (8 mg total) by mouth every 8 (eight) hours as needed for Nausea.  Qty: 30 tablet, Refills: 0      pravastatin (PRAVACHOL) 20 MG tablet Take 1 tablet (20 mg total) by mouth once daily.  Qty: 90 tablet, Refills: 3      TRUEPLUS LANCETS 26 gauge Misc       TRUETEST TEST STRIPS Strp       zolpidem (AMBIEN) 5 MG Tab Take 1 tablet (5 mg total) by mouth nightly as needed.  Qty: 30 tablet, Refills: 0       !! - Potential duplicate medications found. Please discuss with provider.          Discharge Procedure Orders  Diet general     Activity as tolerated     Keep surgical extremity elevated     Lifting restrictions     Call MD for:  temperature >100.4     Call MD for:  persistent nausea and vomiting     Call MD for:  severe uncontrolled pain     Call MD for:  redness, tenderness, or signs of infection (pain, swelling, redness, odor or green/yellow discharge around incision site)     Leave dressing on - Keep it clean, dry, and intact until clinic visit       Follow-up Information     FORD DAMIAN M.D. In 2 days.    Contact information:  [address] [facility] [address]  [phone]

## 2017-07-11 NOTE — PLAN OF CARE
Discharge instructions reviewed with pt. Pt verbalizes understanding. PIV discontinued as per md order. Tip intact. Pt tolerated well. Pt discharged to family via wheelchair

## 2017-07-11 NOTE — PLAN OF CARE
Pt returned to via stretcher,. Pt sleeping but arouses to voice. Respirations even and unlabored. vss (see flow sheet). Dressings to abdomen c/d/i. Pt denies pain

## 2017-07-11 NOTE — ANESTHESIA POSTPROCEDURE EVALUATION
"Anesthesia Post Evaluation    Patient: Azucena Morrison    Procedure(s) Performed: Procedure(s) (LRB):  DEBRIDEMENT-WOUND-ABDOMINAL WALL (N/A)    Final Anesthesia Type: MAC  Patient location during evaluation: OPS  Patient participation: Yes- Able to Participate  Level of consciousness: awake and alert  Post-procedure vital signs: reviewed and stable  Pain management: adequate  Airway patency: patent  PONV status at discharge: No PONV  Anesthetic complications: no      Cardiovascular status: blood pressure returned to baseline and hemodynamically stable  Respiratory status: unassisted, spontaneous ventilation and room air  Hydration status: euvolemic  Follow-up not needed.        Visit Vitals  BP (!) (P) 174/96 (BP Location: Right arm)   Pulse 85   Temp 36.6 °C (97.9 °F) (Oral)   Resp 18   Ht 5' 2" (1.575 m)   Wt 76.7 kg (169 lb)   SpO2 99%   BMI 30.91 kg/m²       Pain/Patience Score: Presence of Pain: denies (7/11/2017 10:15 AM)  Patience Score: 10 (7/11/2017 10:15 AM)      "

## 2017-07-11 NOTE — H&P
Admitting Physician: Ulisses Horton MD     Patient`s Name: Azucena Morrison , 67 y.o. female     HISTORY AND CHIEF COMPLAINT  C/o painful tender swelling lower abdominal wall with drainage  With mild fever , no nausea or vomiting, moving or diarrhea      Patient Active Problem List   Diagnosis    History of hemicolectomy    Status post Aiyana's procedure    HTN (hypertension)    Type 2 diabetes mellitus without complication    Suture granuloma    Essential hypertension    Drainage from wound    S/P colostomy    Entero-enteric fistula    S/P exploratory laparotomy    Tachycardia              Past Medical History:   Diagnosis Date    Diabetes mellitus      Hyperlipemia      Hypertension                 Past Surgical History:   Procedure Laterality Date     SECTION, CLASSIC        CHOLECYSTECTOMY       COLON SURGERY        \[facility] [place] [facility]\""    COLOSTOMY Left 2015    INCISIONAL HERNIA REPAIR Right 2010                 Prior to Admission medications    Medication Sig Start Date End Date Taking? Authorizing Provider   acetaminophen (TYLENOL) 325 MG tablet Take 2 tablets (650 mg total) by mouth every 8 (eight) hours as needed. 16     Ulisses Horton MD   amlodipine (NORVASC) 10 MG tablet   16     Historical Provider, MD   clindamycin (CLEOCIN) 300 MG capsule Take 1 capsule (300 mg total) by mouth 3 (three) times daily. 17     Ulisses Horton MD   glyBURIDE (DIABETA) 5 MG tablet   16     Historical Provider, MD   metformin (GLUCOPHAGE) 1000 MG tablet   16     Historical Provider, MD   metformin (GLUCOPHAGE) 500 MG tablet Take 1,000 mg by mouth 2 (two) times daily with meals.       Historical Provider, MD   methocarbamol (ROBAXIN) 750 MG Tab Take 1 tablet (750 mg total) by mouth 2 (two) times daily. 17     Ulisses Horton MD   mupirocin (BACTROBAN) 2 % ointment   3/29/16     Historical Provider, MD   ondansetron (ZOFRAN) 8 MG tablet Take 1 tablet  (8 mg total) by mouth every 8 (eight) hours as needed for Nausea. 1/4/17     Ulisses Horton MD   pravastatin (PRAVACHOL) 20 MG tablet Take 1 tablet (20 mg total) by mouth once daily. 11/28/16 11/28/17   Ulisses Horton MD   TRUEPLUS LANCETS 26 gauge Misc   4/7/16     Historical Provider, MD   TRUETEST TEST STRIPS Strp   4/7/16     Historical Provider, MD   zolpidem (AMBIEN) 5 MG Tab Take 1 tablet (5 mg total) by mouth nightly as needed. 12/21/16 6/29/17   Ulisses Horton MD             Current Outpatient Prescriptions on File Prior to Encounter   Medication Sig Dispense Refill    acetaminophen (TYLENOL) 325 MG tablet Take 2 tablets (650 mg total) by mouth every 8 (eight) hours as needed.   0    amlodipine (NORVASC) 10 MG tablet          clindamycin (CLEOCIN) 300 MG capsule Take 1 capsule (300 mg total) by mouth 3 (three) times daily. 60 capsule 1    glyBURIDE (DIABETA) 5 MG tablet          metformin (GLUCOPHAGE) 1000 MG tablet          metformin (GLUCOPHAGE) 500 MG tablet Take 1,000 mg by mouth 2 (two) times daily with meals.        methocarbamol (ROBAXIN) 750 MG Tab Take 1 tablet (750 mg total) by mouth 2 (two) times daily. 60 tablet 0    mupirocin (BACTROBAN) 2 % ointment          ondansetron (ZOFRAN) 8 MG tablet Take 1 tablet (8 mg total) by mouth every 8 (eight) hours as needed for Nausea. 30 tablet 0    pravastatin (PRAVACHOL) 20 MG tablet Take 1 tablet (20 mg total) by mouth once daily. 90 tablet 3    TRUEPLUS LANCETS 26 gauge Misc          TRUETEST TEST STRIPS Strp          zolpidem (AMBIEN) 5 MG Tab Take 1 tablet (5 mg total) by mouth nightly as needed. 30 tablet 0      No current facility-administered medications on file prior to encounter.                Review of patient's allergies indicates:   Allergen Reactions    Chlorhexidine gluconate Rash       Chlorhexidine/alcohol wipes. Rash and blistering.         Social History:   reports that she has never smoked. She has never  used smokeless tobacco. She reports that she does not drink alcohol or use drugs.      No family history on file.     PHYSICAL EXAMINATION  Temp:  [97.7 °F (36.5 °C)] 97.7 °F (36.5 °C)  Pulse:  [85] 85  BP: (187)/(99) 187/99     General Condition:   alert x 3     Head & Neck  Anemia: None  Jaundice: None  Neck vein: Not distended  Carotid Bruits: none  Lymph nodes: none palpable  Thyroid: normal     Chest: normal     Heart: normal     Rt. Breast: not examined  Lt. Breast: not examined  Axillary lymph nodes: none     Abdomen: Soft,  None tender with no palpable mass or organ  Hernia: none     Rectal: Defered     Extremities: normal     Vascular: normal     Specific focus Examination     Imp: abdominal wall abscess, dm , htn, s/p colon resection     Plan: excision and debridement abdominal wound.

## 2017-07-12 LAB
GRAM STN SPEC: NORMAL
GRAM STN SPEC: NORMAL

## 2017-07-12 NOTE — OP NOTE
Operative Note:  Date Operation: 7/11/17    Pre op Diagnosis : Diabetic Non healing Abdominal wound.    Post Op Diagnosis : Same    Operataion: I&D and excision and debridement abdominal wound including muscle and fascia.    Surgeon : Dr.Mohammad Horton    Procedure: After satisfactory anaesthesia patient in supine position abdomen was preped and draped in normal sterile fashion using cholra prep. Area of the wound was infiltrated using 1% xylocaine solution. Cul;tures was taken for aerobic and anaerobic.  Eliptical incision was made in the same area , excising the infected tissue up to the deep tissue . Subcutaneous  bleeders were clamped and Bovied , infected necrotic tissue was excised and and a long proline suture was removed at the fascial level , no communication was notted to the abdominal cavity, . Wound was thoroughly irrigated using antibiotic solution, Hemostasis was maintained . Wound was packed using iodoform packing .  Sterile guaze dressing was applied . Instrument count and sponge count was . Correct Patient  tolerated procedure well and was sent to recovery room in stable condition.  Estimated blood loss was 40 cc    specimen was infected tissue.    Ulisses Horton

## 2017-07-13 ENCOUNTER — HOSPITAL ENCOUNTER (OUTPATIENT)
Dept: WOUND CARE | Facility: HOSPITAL | Age: 67
Discharge: HOME | End: 2017-07-13
Attending: SURGERY
Payer: MEDICARE

## 2017-07-13 VITALS
HEART RATE: 88 BPM | SYSTOLIC BLOOD PRESSURE: 204 MMHG | HEIGHT: 62 IN | BODY MASS INDEX: 31.1 KG/M2 | TEMPERATURE: 98 F | DIASTOLIC BLOOD PRESSURE: 102 MMHG | WEIGHT: 169 LBS

## 2017-07-13 DIAGNOSIS — E11.9 TYPE 2 DIABETES MELLITUS WITHOUT COMPLICATION, UNSPECIFIED LONG TERM INSULIN USE STATUS: ICD-10-CM

## 2017-07-13 DIAGNOSIS — Z93.3 S/P COLOSTOMY: ICD-10-CM

## 2017-07-13 DIAGNOSIS — T81.89XA SUTURE GRANULOMA, INITIAL ENCOUNTER: ICD-10-CM

## 2017-07-13 DIAGNOSIS — T14.8XXA DRAINAGE FROM WOUND: ICD-10-CM

## 2017-07-13 LAB
BACTERIA SPEC AEROBE CULT: NORMAL
BACTERIA SPEC AEROBE CULT: NORMAL

## 2017-07-13 PROCEDURE — 99213 OFFICE O/P EST LOW 20 MIN: CPT

## 2017-07-17 LAB — BACTERIA SPEC ANAEROBE CULT: NORMAL

## 2017-07-17 NOTE — PHYSICIAN QUERY
PT Name: Azucena Morrison  MR #: [id number]     Physician Query Form - Documentation Clarification      CDS/: Jennifer Mack               Contact information: [email or web address]     This form is a permanent document in the medical record.     Query Date: July 17, 2017    By submitting this query, we are merely seeking further clarification of documentation. Please utilize your independent clinical judgment when addressing the question(s) below.    The Medical record reflects the following:    Supporting Clinical Findings Location in Medical Record   Abscess of abdominal wall       History & Physical                                                                                      Dear Doctor, Please report the size of the abdominal abscess that was debrided.    Provider Use Only        4 x 5 x 6 cm                                                                                                                       [  ] Clinically undetermined

## 2017-07-19 ENCOUNTER — HOSPITAL ENCOUNTER (OUTPATIENT)
Dept: WOUND CARE | Facility: HOSPITAL | Age: 67
Discharge: HOME | End: 2017-07-19
Attending: SURGERY
Payer: MEDICARE

## 2017-07-19 VITALS — TEMPERATURE: 98 F | SYSTOLIC BLOOD PRESSURE: 179 MMHG | DIASTOLIC BLOOD PRESSURE: 102 MMHG | HEART RATE: 94 BPM

## 2017-07-19 DIAGNOSIS — L02.211 ABSCESS OF ABDOMINAL WALL: Primary | ICD-10-CM

## 2017-07-19 PROCEDURE — 99214 OFFICE O/P EST MOD 30 MIN: CPT

## 2017-07-19 RX ORDER — AMOXICILLIN AND CLAVULANATE POTASSIUM 875; 125 MG/1; MG/1
1 TABLET, FILM COATED ORAL EVERY 12 HOURS
Status: DISCONTINUED | OUTPATIENT
Start: 2017-07-19 | End: 2018-02-28

## 2017-07-19 RX ORDER — AMOXICILLIN AND CLAVULANATE POTASSIUM 875; 125 MG/1; MG/1
1 TABLET, FILM COATED ORAL 2 TIMES DAILY
Qty: 60 TABLET | Refills: 1 | Status: SHIPPED | OUTPATIENT
Start: 2017-07-19 | End: 2017-08-16 | Stop reason: SDUPTHER

## 2017-07-19 RX ORDER — HYDROCODONE BITARTRATE AND ACETAMINOPHEN 5; 325 MG/1; MG/1
1 TABLET ORAL EVERY 4 HOURS PRN
Qty: 32 TABLET | Refills: 0 | Status: SHIPPED | OUTPATIENT
Start: 2017-07-19 | End: 2017-08-16 | Stop reason: SDUPTHER

## 2017-07-26 ENCOUNTER — HOSPITAL ENCOUNTER (OUTPATIENT)
Dept: WOUND CARE | Facility: HOSPITAL | Age: 67
Discharge: HOME | End: 2017-07-26
Attending: SURGERY
Payer: MEDICARE

## 2017-07-26 VITALS — SYSTOLIC BLOOD PRESSURE: 183 MMHG | DIASTOLIC BLOOD PRESSURE: 96 MMHG | TEMPERATURE: 98 F | HEART RATE: 92 BPM

## 2017-07-26 DIAGNOSIS — L98.499 TYPE 2 DIABETES MELLITUS WITH OTHER SKIN ULCER: ICD-10-CM

## 2017-07-26 DIAGNOSIS — E11.622 TYPE 2 DIABETES MELLITUS WITH OTHER SKIN ULCER: ICD-10-CM

## 2017-07-26 PROCEDURE — 99213 OFFICE O/P EST LOW 20 MIN: CPT

## 2017-07-26 RX ORDER — CIPROFLOXACIN 500 MG/1
1 TABLET ORAL 2 TIMES DAILY
COMMUNITY
Start: 2017-07-18 | End: 2017-08-30 | Stop reason: ALTCHOICE

## 2017-08-02 ENCOUNTER — HOSPITAL ENCOUNTER (OUTPATIENT)
Dept: WOUND CARE | Facility: HOSPITAL | Age: 67
Discharge: HOME | End: 2017-08-02
Attending: SURGERY
Payer: MEDICARE

## 2017-08-02 VITALS
DIASTOLIC BLOOD PRESSURE: 90 MMHG | WEIGHT: 169 LBS | BODY MASS INDEX: 31.1 KG/M2 | HEART RATE: 103 BPM | SYSTOLIC BLOOD PRESSURE: 154 MMHG | TEMPERATURE: 98 F | HEIGHT: 62 IN

## 2017-08-02 DIAGNOSIS — E11.622 TYPE 2 DIABETES MELLITUS WITH OTHER SKIN ULCER: ICD-10-CM

## 2017-08-02 DIAGNOSIS — L98.499 TYPE 2 DIABETES MELLITUS WITH OTHER SKIN ULCER: ICD-10-CM

## 2017-08-02 DIAGNOSIS — L02.211 ABSCESS OF ABDOMINAL WALL: Primary | ICD-10-CM

## 2017-08-02 PROCEDURE — 99214 OFFICE O/P EST MOD 30 MIN: CPT

## 2017-08-02 NOTE — PROGRESS NOTES
Much of the documentation for this visit was completed in the [facility] system. Please see the attached documentation for further details about this patient's care.     Ember Mckinney RN

## 2017-08-09 ENCOUNTER — HOSPITAL ENCOUNTER (OUTPATIENT)
Dept: WOUND CARE | Facility: HOSPITAL | Age: 67
Discharge: HOME | End: 2017-08-09
Attending: SURGERY
Payer: MEDICARE

## 2017-08-09 ENCOUNTER — HOSPITAL ENCOUNTER (OUTPATIENT)
Dept: RADIOLOGY | Facility: HOSPITAL | Age: 67
Discharge: HOME | End: 2017-08-09
Attending: SURGERY
Payer: MEDICARE

## 2017-08-09 VITALS
WEIGHT: 169 LBS | DIASTOLIC BLOOD PRESSURE: 91 MMHG | SYSTOLIC BLOOD PRESSURE: 141 MMHG | TEMPERATURE: 98 F | HEIGHT: 62 IN | BODY MASS INDEX: 31.1 KG/M2 | HEART RATE: 92 BPM

## 2017-08-09 DIAGNOSIS — L98.499 TYPE 2 DIABETES MELLITUS WITH OTHER SKIN ULCER: ICD-10-CM

## 2017-08-09 DIAGNOSIS — E11.622 TYPE 2 DIABETES MELLITUS WITH OTHER SKIN ULCER: ICD-10-CM

## 2017-08-09 DIAGNOSIS — L02.211 ABSCESS OF ABDOMINAL WALL: Primary | ICD-10-CM

## 2017-08-09 PROCEDURE — 74177 CT ABD & PELVIS W/CONTRAST: CPT | Mod: TC

## 2017-08-09 PROCEDURE — 25500020 PHARM REV CODE 255: Performed by: SURGERY

## 2017-08-09 PROCEDURE — 74177 CT ABD & PELVIS W/CONTRAST: CPT | Mod: 26,,, | Performed by: RADIOLOGY

## 2017-08-09 PROCEDURE — 99213 OFFICE O/P EST LOW 20 MIN: CPT

## 2017-08-09 RX ADMIN — IOHEXOL 30 ML: 350 INJECTION, SOLUTION INTRAVENOUS at 11:08

## 2017-08-09 RX ADMIN — IOHEXOL 30 ML: 350 INJECTION, SOLUTION INTRAVENOUS at 09:08

## 2017-08-09 RX ADMIN — IOHEXOL 75 ML: 350 INJECTION, SOLUTION INTRAVENOUS at 11:08

## 2017-08-09 NOTE — PROGRESS NOTES
Much of the documentation for this visit was completed in the [facility] system. Please see the attached documentation for further details about this patient's care.     Ember Mckniney RN

## 2017-08-14 LAB — FUNGUS SPEC CULT: NORMAL

## 2017-08-16 ENCOUNTER — HOSPITAL ENCOUNTER (OUTPATIENT)
Dept: WOUND CARE | Facility: HOSPITAL | Age: 67
Discharge: HOME | End: 2017-08-16
Attending: SURGERY
Payer: MEDICARE

## 2017-08-16 VITALS
SYSTOLIC BLOOD PRESSURE: 169 MMHG | TEMPERATURE: 98 F | DIASTOLIC BLOOD PRESSURE: 96 MMHG | HEIGHT: 62 IN | BODY MASS INDEX: 31.1 KG/M2 | HEART RATE: 109 BPM | WEIGHT: 169 LBS

## 2017-08-16 DIAGNOSIS — Z98.890 S/P EXPLORATORY LAPAROTOMY: ICD-10-CM

## 2017-08-16 DIAGNOSIS — L02.211 ABSCESS OF ABDOMINAL WALL: Primary | ICD-10-CM

## 2017-08-16 DIAGNOSIS — E11.9 TYPE 2 DIABETES MELLITUS WITHOUT COMPLICATION, UNSPECIFIED LONG TERM INSULIN USE STATUS: ICD-10-CM

## 2017-08-16 DIAGNOSIS — E11.622 TYPE 2 DIABETES MELLITUS WITH OTHER SKIN ULCER, UNSPECIFIED LONG TERM INSULIN USE STATUS: ICD-10-CM

## 2017-08-16 DIAGNOSIS — I10 ESSENTIAL HYPERTENSION: ICD-10-CM

## 2017-08-16 DIAGNOSIS — K63.2 ENTERO-ENTERIC FISTULA: ICD-10-CM

## 2017-08-16 DIAGNOSIS — T14.8XXA DRAINAGE FROM WOUND: ICD-10-CM

## 2017-08-16 DIAGNOSIS — L98.499 TYPE 2 DIABETES MELLITUS WITH OTHER SKIN ULCER, UNSPECIFIED LONG TERM INSULIN USE STATUS: ICD-10-CM

## 2017-08-16 PROCEDURE — 99213 OFFICE O/P EST LOW 20 MIN: CPT

## 2017-08-16 RX ORDER — AMOXICILLIN AND CLAVULANATE POTASSIUM 875; 125 MG/1; MG/1
1 TABLET, FILM COATED ORAL 2 TIMES DAILY
Qty: 60 TABLET | Refills: 1 | Status: SHIPPED | OUTPATIENT
Start: 2017-08-16 | End: 2017-08-16 | Stop reason: SDUPTHER

## 2017-08-16 RX ORDER — AMOXICILLIN AND CLAVULANATE POTASSIUM 875; 125 MG/1; MG/1
1 TABLET, FILM COATED ORAL 2 TIMES DAILY
Qty: 60 TABLET | Refills: 1 | Status: SHIPPED | OUTPATIENT
Start: 2017-08-16 | End: 2017-10-18 | Stop reason: SDUPTHER

## 2017-08-16 RX ORDER — HYDROCODONE BITARTRATE AND ACETAMINOPHEN 5; 325 MG/1; MG/1
1 TABLET ORAL EVERY 4 HOURS PRN
Qty: 32 TABLET | Refills: 0 | Status: SHIPPED | OUTPATIENT
Start: 2017-08-16 | End: 2018-02-28

## 2017-08-23 ENCOUNTER — HOSPITAL ENCOUNTER (OUTPATIENT)
Dept: RADIOLOGY | Facility: HOSPITAL | Age: 67
Discharge: HOME | End: 2017-08-23
Attending: SURGERY
Payer: MEDICARE

## 2017-08-23 ENCOUNTER — HOSPITAL ENCOUNTER (OUTPATIENT)
Dept: WOUND CARE | Facility: HOSPITAL | Age: 67
Discharge: HOME | End: 2017-08-23
Attending: SURGERY
Payer: MEDICARE

## 2017-08-23 VITALS
DIASTOLIC BLOOD PRESSURE: 85 MMHG | TEMPERATURE: 98 F | HEIGHT: 62 IN | SYSTOLIC BLOOD PRESSURE: 163 MMHG | BODY MASS INDEX: 31.1 KG/M2 | WEIGHT: 169 LBS | HEART RATE: 75 BPM

## 2017-08-23 DIAGNOSIS — T14.8XXA DRAINAGE FROM WOUND: ICD-10-CM

## 2017-08-23 DIAGNOSIS — Z93.3 S/P COLOSTOMY: ICD-10-CM

## 2017-08-23 DIAGNOSIS — I10 ESSENTIAL HYPERTENSION: ICD-10-CM

## 2017-08-23 DIAGNOSIS — K63.2 ENTERO-ENTERIC FISTULA: ICD-10-CM

## 2017-08-23 DIAGNOSIS — T81.89XA SUTURE GRANULOMA, INITIAL ENCOUNTER: ICD-10-CM

## 2017-08-23 DIAGNOSIS — E11.9 TYPE 2 DIABETES MELLITUS WITHOUT COMPLICATION, UNSPECIFIED LONG TERM INSULIN USE STATUS: ICD-10-CM

## 2017-08-23 DIAGNOSIS — Z98.890 S/P EXPLORATORY LAPAROTOMY: ICD-10-CM

## 2017-08-23 DIAGNOSIS — L98.499 TYPE 2 DIABETES MELLITUS WITH OTHER SKIN ULCER, UNSPECIFIED LONG TERM INSULIN USE STATUS: ICD-10-CM

## 2017-08-23 DIAGNOSIS — R00.0 TACHYCARDIA: ICD-10-CM

## 2017-08-23 DIAGNOSIS — Z93.3 STATUS POST HARTMANN'S PROCEDURE: ICD-10-CM

## 2017-08-23 DIAGNOSIS — E11.622 TYPE 2 DIABETES MELLITUS WITH OTHER SKIN ULCER, UNSPECIFIED LONG TERM INSULIN USE STATUS: ICD-10-CM

## 2017-08-23 DIAGNOSIS — L02.211 ABSCESS OF ABDOMINAL WALL: Primary | ICD-10-CM

## 2017-08-23 DIAGNOSIS — Z90.49 HISTORY OF HEMICOLECTOMY: ICD-10-CM

## 2017-08-23 DIAGNOSIS — L02.211 ABSCESS OF ABDOMINAL WALL: ICD-10-CM

## 2017-08-23 PROCEDURE — 99214 OFFICE O/P EST MOD 30 MIN: CPT | Mod: 25

## 2017-08-23 PROCEDURE — 25500020 PHARM REV CODE 255: Performed by: SURGERY

## 2017-08-23 PROCEDURE — 76080 X-RAY EXAM OF FISTULA: CPT | Mod: 26,,, | Performed by: RADIOLOGY

## 2017-08-23 PROCEDURE — 76999 ECHO EXAMINATION PROCEDURE: CPT | Mod: TC

## 2017-08-23 PROCEDURE — 76080 X-RAY EXAM OF FISTULA: CPT | Mod: TC

## 2017-08-23 PROCEDURE — 20501 NJX SINUS TRACT DIAGNOSTIC: CPT | Mod: ,,, | Performed by: RADIOLOGY

## 2017-08-23 RX ORDER — GENTAMICIN SULFATE 1 MG/G
CREAM TOPICAL 3 TIMES DAILY
Qty: 30 G | Refills: 1 | Status: SHIPPED | OUTPATIENT
Start: 2017-08-23 | End: 2018-02-28

## 2017-08-23 RX ORDER — ONDANSETRON HYDROCHLORIDE 8 MG/1
8 TABLET, FILM COATED ORAL EVERY 8 HOURS PRN
Qty: 30 TABLET | Refills: 0 | Status: SHIPPED | OUTPATIENT
Start: 2017-08-23 | End: 2017-08-31 | Stop reason: SDUPTHER

## 2017-08-23 RX ORDER — GENTAMICIN SULFATE 1 MG/G
CREAM TOPICAL 3 TIMES DAILY
Qty: 30 G | Refills: 1 | Status: SHIPPED | OUTPATIENT
Start: 2017-08-23 | End: 2017-08-23 | Stop reason: SDUPTHER

## 2017-08-23 RX ADMIN — IOHEXOL 150 ML: 350 INJECTION, SOLUTION INTRAVENOUS at 10:08

## 2017-08-30 ENCOUNTER — HOSPITAL ENCOUNTER (OUTPATIENT)
Dept: WOUND CARE | Facility: HOSPITAL | Age: 67
Discharge: HOME | End: 2017-08-30
Attending: SURGERY
Payer: MEDICARE

## 2017-08-30 VITALS
TEMPERATURE: 99 F | HEIGHT: 62 IN | SYSTOLIC BLOOD PRESSURE: 173 MMHG | BODY MASS INDEX: 31.1 KG/M2 | WEIGHT: 169 LBS | HEART RATE: 94 BPM | DIASTOLIC BLOOD PRESSURE: 86 MMHG

## 2017-08-30 DIAGNOSIS — Z93.3 STATUS POST HARTMANN'S PROCEDURE: ICD-10-CM

## 2017-08-30 DIAGNOSIS — T81.89XA SUTURE GRANULOMA, INITIAL ENCOUNTER: ICD-10-CM

## 2017-08-30 DIAGNOSIS — L02.211 ABSCESS OF ABDOMINAL WALL: Primary | ICD-10-CM

## 2017-08-30 DIAGNOSIS — K63.2 ENTERO-ENTERIC FISTULA: ICD-10-CM

## 2017-08-30 DIAGNOSIS — Z98.890 S/P EXPLORATORY LAPAROTOMY: ICD-10-CM

## 2017-08-30 DIAGNOSIS — Z93.3 S/P COLOSTOMY: ICD-10-CM

## 2017-08-30 DIAGNOSIS — E11.9 TYPE 2 DIABETES MELLITUS WITHOUT COMPLICATION, UNSPECIFIED LONG TERM INSULIN USE STATUS: ICD-10-CM

## 2017-08-30 DIAGNOSIS — Z90.49 HISTORY OF HEMICOLECTOMY: ICD-10-CM

## 2017-08-30 DIAGNOSIS — L98.499 TYPE 2 DIABETES MELLITUS WITH OTHER SKIN ULCER, UNSPECIFIED LONG TERM INSULIN USE STATUS: ICD-10-CM

## 2017-08-30 DIAGNOSIS — E11.622 TYPE 2 DIABETES MELLITUS WITH OTHER SKIN ULCER, UNSPECIFIED LONG TERM INSULIN USE STATUS: ICD-10-CM

## 2017-08-30 DIAGNOSIS — T14.8XXA DRAINAGE FROM WOUND: ICD-10-CM

## 2017-08-30 DIAGNOSIS — I10 ESSENTIAL HYPERTENSION: ICD-10-CM

## 2017-08-30 PROCEDURE — 99214 OFFICE O/P EST MOD 30 MIN: CPT

## 2017-08-30 PROCEDURE — 99213 OFFICE O/P EST LOW 20 MIN: CPT

## 2017-08-30 PROCEDURE — 87186 SC STD MICRODIL/AGAR DIL: CPT | Mod: 59

## 2017-08-30 PROCEDURE — 87076 CULTURE ANAEROBE IDENT EACH: CPT

## 2017-08-30 PROCEDURE — 87075 CULTR BACTERIA EXCEPT BLOOD: CPT

## 2017-08-30 PROCEDURE — 87077 CULTURE AEROBIC IDENTIFY: CPT

## 2017-08-30 PROCEDURE — 87070 CULTURE OTHR SPECIMN AEROBIC: CPT

## 2017-08-30 RX ORDER — OXYCODONE AND ACETAMINOPHEN 10; 325 MG/1; MG/1
1 TABLET ORAL EVERY 4 HOURS PRN
Qty: 32 TABLET | Refills: 0 | Status: SHIPPED | OUTPATIENT
Start: 2017-08-30 | End: 2018-02-28

## 2017-08-31 ENCOUNTER — HOSPITAL ENCOUNTER (EMERGENCY)
Facility: HOSPITAL | Age: 67
Discharge: HOME | End: 2017-08-31
Attending: EMERGENCY MEDICINE
Payer: MEDICARE

## 2017-08-31 VITALS
WEIGHT: 169 LBS | RESPIRATION RATE: 11 BRPM | HEIGHT: 62 IN | OXYGEN SATURATION: 97 % | TEMPERATURE: 98 F | BODY MASS INDEX: 31.1 KG/M2 | SYSTOLIC BLOOD PRESSURE: 162 MMHG | HEART RATE: 68 BPM | DIASTOLIC BLOOD PRESSURE: 78 MMHG

## 2017-08-31 DIAGNOSIS — R11.0 NAUSEA: Primary | ICD-10-CM

## 2017-08-31 DIAGNOSIS — R11.10 VOMITING: ICD-10-CM

## 2017-08-31 DIAGNOSIS — S31.109A OPEN WOUND OF ABDOMEN, INITIAL ENCOUNTER: ICD-10-CM

## 2017-08-31 LAB
ALBUMIN SERPL BCP-MCNC: 2.8 G/DL
ALP SERPL-CCNC: 88 U/L
ALT SERPL W/O P-5'-P-CCNC: 9 U/L
ANION GAP SERPL CALC-SCNC: 11 MMOL/L
AST SERPL-CCNC: 13 U/L
BACTERIA #/AREA URNS HPF: NORMAL /HPF
BASOPHILS # BLD AUTO: 0.01 K/UL
BASOPHILS NFR BLD: 0.1 %
BILIRUB SERPL-MCNC: 0.3 MG/DL
BILIRUB UR QL STRIP: NEGATIVE
BUN SERPL-MCNC: 13 MG/DL
CALCIUM SERPL-MCNC: 9.5 MG/DL
CHLORIDE SERPL-SCNC: 97 MMOL/L
CLARITY UR: CLEAR
CO2 SERPL-SCNC: 27 MMOL/L
COLOR UR: YELLOW
CREAT SERPL-MCNC: 1.1 MG/DL
DIFFERENTIAL METHOD: ABNORMAL
EOSINOPHIL # BLD AUTO: 0.3 K/UL
EOSINOPHIL NFR BLD: 2.7 %
ERYTHROCYTE [DISTWIDTH] IN BLOOD BY AUTOMATED COUNT: 17.7 %
EST. GFR  (AFRICAN AMERICAN): >60 ML/MIN/1.73 M^2
EST. GFR  (NON AFRICAN AMERICAN): 52 ML/MIN/1.73 M^2
GLUCOSE SERPL-MCNC: 166 MG/DL
GLUCOSE UR QL STRIP: NEGATIVE
HCT VFR BLD AUTO: 35.7 %
HGB BLD-MCNC: 12 G/DL
HGB UR QL STRIP: NEGATIVE
HYALINE CASTS #/AREA URNS LPF: 0 /LPF
KETONES UR QL STRIP: NEGATIVE
LACTATE SERPL-SCNC: 2.2 MMOL/L
LEUKOCYTE ESTERASE UR QL STRIP: NEGATIVE
LYMPHOCYTES # BLD AUTO: 1.7 K/UL
LYMPHOCYTES NFR BLD: 17.5 %
MCH RBC QN AUTO: 25.4 PG
MCHC RBC AUTO-ENTMCNC: 33.6 G/DL
MCV RBC AUTO: 76 FL
MICROSCOPIC COMMENT: NORMAL
MONOCYTES # BLD AUTO: 0.5 K/UL
MONOCYTES NFR BLD: 5.3 %
NEUTROPHILS # BLD AUTO: 7.4 K/UL
NEUTROPHILS NFR BLD: 74.2 %
NITRITE UR QL STRIP: NEGATIVE
PH UR STRIP: 8 [PH] (ref 5–8)
PLATELET # BLD AUTO: 341 K/UL
PMV BLD AUTO: 8 FL
POCT GLUCOSE: 179 MG/DL (ref 70–110)
POTASSIUM SERPL-SCNC: 3.4 MMOL/L
PROT SERPL-MCNC: 8.5 G/DL
PROT UR QL STRIP: ABNORMAL
RBC # BLD AUTO: 4.72 M/UL
RBC #/AREA URNS HPF: 0 /HPF (ref 0–4)
SODIUM SERPL-SCNC: 135 MMOL/L
SP GR UR STRIP: 1.02 (ref 1–1.03)
SQUAMOUS #/AREA URNS HPF: NORMAL /HPF
URN SPEC COLLECT METH UR: ABNORMAL
UROBILINOGEN UR STRIP-ACNC: NEGATIVE EU/DL
WBC # BLD AUTO: 9.94 K/UL
WBC #/AREA URNS HPF: 0 /HPF (ref 0–5)

## 2017-08-31 PROCEDURE — 87040 BLOOD CULTURE FOR BACTERIA: CPT

## 2017-08-31 PROCEDURE — 80053 COMPREHEN METABOLIC PANEL: CPT

## 2017-08-31 PROCEDURE — 96374 THER/PROPH/DIAG INJ IV PUSH: CPT

## 2017-08-31 PROCEDURE — S0028 INJECTION, FAMOTIDINE, 20 MG: HCPCS | Performed by: EMERGENCY MEDICINE

## 2017-08-31 PROCEDURE — 87186 SC STD MICRODIL/AGAR DIL: CPT

## 2017-08-31 PROCEDURE — 96361 HYDRATE IV INFUSION ADD-ON: CPT

## 2017-08-31 PROCEDURE — 96375 TX/PRO/DX INJ NEW DRUG ADDON: CPT

## 2017-08-31 PROCEDURE — 82962 GLUCOSE BLOOD TEST: CPT

## 2017-08-31 PROCEDURE — 25000003 PHARM REV CODE 250: Performed by: EMERGENCY MEDICINE

## 2017-08-31 PROCEDURE — 81000 URINALYSIS NONAUTO W/SCOPE: CPT

## 2017-08-31 PROCEDURE — 83605 ASSAY OF LACTIC ACID: CPT

## 2017-08-31 PROCEDURE — 99284 EMERGENCY DEPT VISIT MOD MDM: CPT | Mod: 25

## 2017-08-31 PROCEDURE — 93005 ELECTROCARDIOGRAM TRACING: CPT

## 2017-08-31 PROCEDURE — 85025 COMPLETE CBC W/AUTO DIFF WBC: CPT

## 2017-08-31 PROCEDURE — 87077 CULTURE AEROBIC IDENTIFY: CPT

## 2017-08-31 PROCEDURE — 93010 ELECTROCARDIOGRAM REPORT: CPT | Mod: ,,, | Performed by: INTERNAL MEDICINE

## 2017-08-31 PROCEDURE — 63600175 PHARM REV CODE 636 W HCPCS: Performed by: EMERGENCY MEDICINE

## 2017-08-31 PROCEDURE — 87070 CULTURE OTHR SPECIMN AEROBIC: CPT

## 2017-08-31 RX ORDER — FAMOTIDINE 20 MG/1
20 TABLET, FILM COATED ORAL
Status: DISCONTINUED | OUTPATIENT
Start: 2017-08-31 | End: 2017-08-31

## 2017-08-31 RX ORDER — DIPHENHYDRAMINE HCL 50 MG
50 CAPSULE ORAL
Status: DISCONTINUED | OUTPATIENT
Start: 2017-08-31 | End: 2017-08-31

## 2017-08-31 RX ORDER — ONDANSETRON 2 MG/ML
8 INJECTION INTRAMUSCULAR; INTRAVENOUS
Status: COMPLETED | OUTPATIENT
Start: 2017-08-31 | End: 2017-08-31

## 2017-08-31 RX ORDER — FAMOTIDINE 10 MG/ML
20 INJECTION INTRAVENOUS 2 TIMES DAILY
Status: DISCONTINUED | OUTPATIENT
Start: 2017-08-31 | End: 2017-08-31

## 2017-08-31 RX ORDER — ONDANSETRON 4 MG/1
8 TABLET, FILM COATED ORAL EVERY 8 HOURS PRN
Qty: 14 TABLET | Refills: 0 | Status: SHIPPED | OUTPATIENT
Start: 2017-08-31 | End: 2019-09-25 | Stop reason: SDUPTHER

## 2017-08-31 RX ORDER — DIPHENHYDRAMINE HYDROCHLORIDE 50 MG/ML
25 INJECTION INTRAMUSCULAR; INTRAVENOUS
Status: COMPLETED | OUTPATIENT
Start: 2017-08-31 | End: 2017-08-31

## 2017-08-31 RX ORDER — FAMOTIDINE 10 MG/ML
20 INJECTION INTRAVENOUS ONCE
Status: COMPLETED | OUTPATIENT
Start: 2017-08-31 | End: 2017-08-31

## 2017-08-31 RX ORDER — POTASSIUM CHLORIDE 20 MEQ/1
40 TABLET, EXTENDED RELEASE ORAL
Status: COMPLETED | OUTPATIENT
Start: 2017-08-31 | End: 2017-08-31

## 2017-08-31 RX ORDER — SODIUM CHLORIDE 9 MG/ML
500 INJECTION, SOLUTION INTRAVENOUS
Status: COMPLETED | OUTPATIENT
Start: 2017-08-31 | End: 2017-08-31

## 2017-08-31 RX ADMIN — POTASSIUM CHLORIDE 40 MEQ: 20 TABLET, EXTENDED RELEASE ORAL at 06:08

## 2017-08-31 RX ADMIN — DIPHENHYDRAMINE HYDROCHLORIDE 25 MG: 50 INJECTION, SOLUTION INTRAMUSCULAR; INTRAVENOUS at 05:08

## 2017-08-31 RX ADMIN — FAMOTIDINE 20 MG: 10 INJECTION, SOLUTION INTRAVENOUS at 05:08

## 2017-08-31 RX ADMIN — SODIUM CHLORIDE 500 ML: 0.9 INJECTION, SOLUTION INTRAVENOUS at 04:08

## 2017-08-31 RX ADMIN — ONDANSETRON 8 MG: 2 INJECTION INTRAMUSCULAR; INTRAVENOUS at 04:08

## 2017-08-31 NOTE — DISCHARGE INSTRUCTIONS
Follow up with Dr. Horton next week as scheduled at wound care    Return to ED with any worsening of symptoms.

## 2017-08-31 NOTE — ED PROVIDER NOTES
Encounter Date: 2017       History     Chief Complaint   Patient presents with    Dizziness     dizziness and vomiting since her sx in July.    Emesis     Was seen by Dr. Horton yesterday.     66 y/o F with history of multiple abdominal surgeries and open abdominal wall wound presents with nausea and vomiting x 2 months since her last surgery.  Dizziness x 2 weeks.  Symptoms worsened today.  Pt reports nausea and vomiting x 3 today.  No fever/chills.  Pt has chronic abd wall wound that is followed by wound care and she was seen by Dr. Horton yesterday.  Pt reports increased drainage from the wound site over the past 48 hours.  No fever/chills.  No increase in pain.  No change in urine output.      Pt reports the dizziness has been constant for 2 weeks and she feels it was brought on by the chronic n/v x 2 months.  She does not have any associated symptoms of HA, diplopia, speech changes, difficulty with gait, numbness, weakness, palpitations.  The dizziness is not constant.  It acutely begins and then resolves w/o intervention.  Symptoms worsen with movement of her head and change of position.  No relieving factors.            Review of patient's allergies indicates:   Allergen Reactions    Chlorhexidine gluconate Rash     Chlorhexidine/alcohol wipes. Rash and blistering.     Past Medical History:   Diagnosis Date    Diabetes mellitus     Hyperlipemia     Hypertension      Past Surgical History:   Procedure Laterality Date     SECTION, CLASSIC      CHOLECYSTECTOMY      [facility] SURGERY      [facility]    COLOSTOMY Left 2015    INCISIONAL HERNIA REPAIR Right 2010     History reviewed. No pertinent family history.  Social History   Substance Use Topics    Smoking status: Never Smoker    Smokeless tobacco: Never Used    Alcohol use No     Review of Systems   Constitutional: Negative for chills and fever.   HENT: Negative for congestion, ear pain, sinus pressure and sore throat.    Eyes:  Negative for photophobia and visual disturbance.   Respiratory: Negative for cough and shortness of breath.    Cardiovascular: Negative for chest pain and leg swelling.   Gastrointestinal: Positive for nausea and vomiting. Negative for abdominal pain, constipation and diarrhea.        Chronic abdominal wall wound   Endocrine: Negative for polydipsia and polyphagia.   Genitourinary: Negative for difficulty urinating and dysuria.   Musculoskeletal: Negative for back pain.   Skin: Negative for pallor and rash.   Allergic/Immunologic: Negative for immunocompromised state.   Neurological: Negative for dizziness, weakness and headaches.   Psychiatric/Behavioral: Negative for agitation and confusion.   All other systems reviewed and are negative.      Physical Exam     Initial Vitals [08/31/17 1531]   BP Pulse Resp Temp SpO2   (!) 189/92 79 18 97.6 °F (36.4 °C) 97 %      MAP       124.33         Physical Exam    Nursing note and vitals reviewed.  Constitutional: She appears well-developed and well-nourished. She is not diaphoretic. No distress.   HENT:   Head: Normocephalic and atraumatic.   Dry mm   Eyes: Conjunctivae and EOM are normal. Pupils are equal, round, and reactive to light. No scleral icterus.   Neck: Normal range of motion. Neck supple.   Cardiovascular: Normal rate, regular rhythm and normal heart sounds. Exam reveals no gallop and no friction rub.    No murmur heard.  Pulmonary/Chest: Breath sounds normal. No respiratory distress. She has no wheezes. She has no rhonchi. She has no rales.   Abdominal: Soft. Bowel sounds are normal. She exhibits no distension. There is no tenderness. There is no rebound and no guarding.   Musculoskeletal: Normal range of motion. She exhibits no edema.   Neurological: She is alert and oriented to person, place, and time. She has normal strength. No cranial nerve deficit or sensory deficit.   Skin: Skin is warm and dry. No rash noted. No erythema.   Psychiatric: She has a normal  mood and affect. Her behavior is normal. Judgment and thought content normal.         ED Course   Procedures  Labs Reviewed   CULTURE, AEROBIC  (SPECIFY SOURCE)   CULTURE, BLOOD   CULTURE, BLOOD   CBC W/ AUTO DIFFERENTIAL   COMPREHENSIVE METABOLIC PANEL   URINALYSIS   LACTIC ACID, PLASMA     Imaging Results          X-Ray Abdomen Flat And Erect (Final result)  Result time 08/31/17 18:39:39    Final result by Tila Zamora MD (08/31/17 18:39:39)                 Impression:      Nonobstructive bowel gas pattern.      Electronically signed by: TILA ZAMORA MD  Date:     08/31/17  Time:    18:39              Narrative:    AP abdomen flat and erect.  Comparison: 11/2016.  CT 8/9/17.    Nonspecific bowel gas pattern.  No evidence to suggest extraction.  Post surgical suture lines are seen.  There is significant retained stool visualized within the right colon.  Multiple surgical clips seen.  No free air identified.  Multiple pelvic phleboliths noted.                              EKG Readings: (Independently Interpreted)   Initial Reading: No STEMI. Rhythm: Normal Sinus Rhythm. Heart Rate: 75. Ectopy: No Ectopy. Conduction: Normal. Clinical Impression: Normal Sinus Rhythm       X-Rays:   Independently Interpreted Readings:   Abdomen: Nonspecific bowel gas.  No free air under diaphragm.  No air fluid levels or signs of obstruction.     Medical Decision Making:   Initial Assessment:   66 y/o F with history of multiple abdominal surgeries and open abdominal wall wound presents with nausea and vomiting x 2 months since her last surgery.  Dizziness x 2 weeks.  Symptoms worsened today.  Pt reports nausea and vomiting x 3 today.  No fever/chills.  Pt has chronic abd wall wound that is followed by wound care and she was seen by Dr. Horton yesterday.  Pt reports increased drainage from the wound site over the past 48 hours.  No fever/chills.  No increase in pain.  No change in urine output.      Pt reports the dizziness has  been constant for 2 weeks and she feels it was brought on by the chronic n/v x 2 months.  She does not have any associated symptoms of HA, diplopia, speech changes, difficulty with gait, numbness, weakness, palpitations.  The dizziness is not constant.  It acutely begins and then resolves w/o intervention.  Symptoms worsen with movement of her head and change of position.  No relieving factors.            Differential Diagnosis:   DDX: vertigo, dehydration, metabolic derangement, wound infection, sepsis, arrhythmia, UTI  Independently Interpreted Test(s):   I have ordered and independently interpreted X-rays - see prior notes.  I have ordered and independently interpreted EKG Reading(s) - see prior notes  Clinical Tests:   Lab Tests: Ordered and Reviewed  The following lab test(s) were unremarkable: CBC and Urinalysis       <> Summary of Lab: hypokalemia  Radiological Study: Ordered and Reviewed  Medical Tests: Ordered and Reviewed  ED Management:   Pt work up is neg and the patient feels better after IVF.  She has not vomiting in ED and is tolerating po intake.  I have spoken with Dr. Horton regarding this patient.  He is aware of the increased drainage of the wound and recommends a colostomy bag placed on wound site.  The patient has a HH nurse that will f/u with her tomorrow and Dr. Horton will continue to follow her as an outpatient.  He feels there may be a fistula even though it was not noted on the fistulagram and has her scheduled for an outpatient CT early next week. No additional ABx needed at this time.     I have discussed this with the patient and she verbalizes understanding of the plan.  Pt queried and denies any further complaint at this time.  She feels well and would like to be d/c home.  She understands that she must f/u with wound care and pcp and MUST return to ED with any worsening of symptoms.   Other:   I have discussed this case with another health care provider.       <> Summary of the  Discussion: Dr. Horton                   ED Course     Clinical Impression:   The primary encounter diagnosis was Nausea. Diagnoses of Vomiting and Open wound of abdomen, initial encounter were also pertinent to this visit.    Disposition:   Disposition: Discharged  Condition: Stable                        Toshia Encarnacion MD  09/05/17 3647

## 2017-08-31 NOTE — ED NOTES
Pt c/o nausea, vomiting, abdominal pain, and dizziness. States she had abdominal surgery with Dr. Horton in July, and has been having nausea and vomiting daily since then. Also c/o dizziness for the past 2 weeks. Reports gait is occasionally unsteady due to the dizziness. States dizziness and vomiting worsened today. Pt has been going to wound care weekly for her lower abdominal incision site, and states she has been having heavy purulent drainage from site since surgery. Last went to wound care and saw Dr. Horton yesterday. PERRLA. No neurologic deficits noted. Pt has pain to the center of her abdomen, above surgical incision. Rest of abdomen is soft, nontender. Purulent drainage noted from incision. Pt denies fever, but reports temperature of 99 at home yesterday. Skin is warm, dry.

## 2017-08-31 NOTE — ED NOTES
Pt is now c/o itching to right forearm. Mild hive-like rash now noted. Pt states she recently learned she is allergic to chlorhexidine when she had surgery. Dr. Encarnacion informed

## 2017-09-01 LAB
BACTERIA SPEC AEROBE CULT: NORMAL
BACTERIA SPEC AEROBE CULT: NORMAL

## 2017-09-01 NOTE — ED NOTES
Patient ambulated to restroom with no problems. Does not appear to be in any pain, nurse explained to family member discharge directions and our reasoning for placement of colostomy bag over wound for drainage collection. Waiting for colostomy bag to arrive from 5th floor

## 2017-09-01 NOTE — ED NOTES
Discharge instructions reviewed with pt and with pt's daughter-in-law over the phone. Both verbalize understanding. Awaiting ride home with son.

## 2017-09-03 NOTE — PROVIDER PROGRESS NOTES - EMERGENCY DEPT.
Encounter Date: 8/31/2017    ED Physician Progress Notes         09/02/2017 1:00 PM patient was sent home with Augmentin; sensitivity pending.  Upon further review patient is currently on gentamicin for this wound was culture is sensitive to.  Patient is followed by Dr. Horton  LC

## 2017-09-05 LAB
BACTERIA BLD CULT: NORMAL
BACTERIA BLD CULT: NORMAL
BACTERIA SPEC AEROBE CULT: NORMAL
BACTERIA SPEC AEROBE CULT: NORMAL

## 2017-09-06 ENCOUNTER — HOSPITAL ENCOUNTER (OUTPATIENT)
Dept: WOUND CARE | Facility: HOSPITAL | Age: 67
Discharge: HOME | End: 2017-09-06
Attending: SURGERY
Payer: MEDICARE

## 2017-09-06 ENCOUNTER — HOSPITAL ENCOUNTER (OUTPATIENT)
Dept: RADIOLOGY | Facility: HOSPITAL | Age: 67
Discharge: HOME | End: 2017-09-06
Attending: SURGERY
Payer: MEDICARE

## 2017-09-06 VITALS
BODY MASS INDEX: 31.1 KG/M2 | TEMPERATURE: 98 F | HEIGHT: 62 IN | DIASTOLIC BLOOD PRESSURE: 87 MMHG | WEIGHT: 169 LBS | SYSTOLIC BLOOD PRESSURE: 178 MMHG | HEART RATE: 76 BPM

## 2017-09-06 DIAGNOSIS — L98.499 TYPE 2 DIABETES MELLITUS WITH OTHER SKIN ULCER, UNSPECIFIED LONG TERM INSULIN USE STATUS: ICD-10-CM

## 2017-09-06 DIAGNOSIS — E11.622 TYPE 2 DIABETES MELLITUS WITH OTHER SKIN ULCER, UNSPECIFIED LONG TERM INSULIN USE STATUS: ICD-10-CM

## 2017-09-06 DIAGNOSIS — L02.211 ABSCESS OF ABDOMINAL WALL: Primary | ICD-10-CM

## 2017-09-06 LAB
BACTERIA SPEC ANAEROBE CULT: NORMAL

## 2017-09-06 PROCEDURE — 99213 OFFICE O/P EST LOW 20 MIN: CPT | Mod: 25

## 2017-09-06 PROCEDURE — 25500020 PHARM REV CODE 255: Performed by: SURGERY

## 2017-09-06 PROCEDURE — 74177 CT ABD & PELVIS W/CONTRAST: CPT | Mod: TC

## 2017-09-06 PROCEDURE — 74177 CT ABD & PELVIS W/CONTRAST: CPT | Mod: 26,,, | Performed by: RADIOLOGY

## 2017-09-06 RX ADMIN — IOHEXOL 30 ML: 350 INJECTION, SOLUTION INTRAVENOUS at 11:09

## 2017-09-06 RX ADMIN — IOHEXOL 30 ML: 350 INJECTION, SOLUTION INTRAVENOUS at 09:09

## 2017-09-06 RX ADMIN — IOHEXOL 75 ML: 350 INJECTION, SOLUTION INTRAVENOUS at 11:09

## 2017-09-12 LAB
ACID FAST MOD KINY STN SPEC: NORMAL
MYCOBACTERIUM SPEC QL CULT: NORMAL

## 2017-09-13 ENCOUNTER — HOSPITAL ENCOUNTER (OUTPATIENT)
Dept: WOUND CARE | Facility: HOSPITAL | Age: 67
Discharge: HOME | End: 2017-09-13
Attending: SURGERY
Payer: MEDICARE

## 2017-09-13 VITALS
HEART RATE: 97 BPM | SYSTOLIC BLOOD PRESSURE: 150 MMHG | TEMPERATURE: 98 F | HEIGHT: 62 IN | WEIGHT: 169 LBS | BODY MASS INDEX: 31.1 KG/M2 | DIASTOLIC BLOOD PRESSURE: 90 MMHG

## 2017-09-13 DIAGNOSIS — E11.622 TYPE 2 DIABETES MELLITUS WITH OTHER SKIN ULCER, UNSPECIFIED LONG TERM INSULIN USE STATUS: ICD-10-CM

## 2017-09-13 DIAGNOSIS — L98.499 TYPE 2 DIABETES MELLITUS WITH OTHER SKIN ULCER, UNSPECIFIED LONG TERM INSULIN USE STATUS: ICD-10-CM

## 2017-09-13 DIAGNOSIS — L02.211 ABSCESS OF ABDOMINAL WALL: Primary | ICD-10-CM

## 2017-09-13 PROCEDURE — 99213 OFFICE O/P EST LOW 20 MIN: CPT

## 2017-09-14 ENCOUNTER — OFFICE VISIT (OUTPATIENT)
Dept: UROLOGY | Facility: CLINIC | Age: 67
End: 2017-09-14
Payer: MEDICARE

## 2017-09-14 VITALS
SYSTOLIC BLOOD PRESSURE: 148 MMHG | HEIGHT: 62 IN | BODY MASS INDEX: 31.1 KG/M2 | DIASTOLIC BLOOD PRESSURE: 103 MMHG | WEIGHT: 169 LBS | HEART RATE: 120 BPM

## 2017-09-14 DIAGNOSIS — T14.8XXA WOUND DRAINAGE: ICD-10-CM

## 2017-09-14 DIAGNOSIS — K63.2 SMALL BOWEL FISTULA: Primary | ICD-10-CM

## 2017-09-14 DIAGNOSIS — R35.0 URINARY FREQUENCY: ICD-10-CM

## 2017-09-14 PROCEDURE — 1125F AMNT PAIN NOTED PAIN PRSNT: CPT | Mod: ,,, | Performed by: STUDENT IN AN ORGANIZED HEALTH CARE EDUCATION/TRAINING PROGRAM

## 2017-09-14 PROCEDURE — 99999 PR PBB SHADOW E&M-EST. PATIENT-LVL III: CPT | Mod: PBBFAC,,, | Performed by: STUDENT IN AN ORGANIZED HEALTH CARE EDUCATION/TRAINING PROGRAM

## 2017-09-14 PROCEDURE — 3077F SYST BP >= 140 MM HG: CPT | Mod: ,,, | Performed by: STUDENT IN AN ORGANIZED HEALTH CARE EDUCATION/TRAINING PROGRAM

## 2017-09-14 PROCEDURE — 99213 OFFICE O/P EST LOW 20 MIN: CPT | Mod: PBBFAC,PO | Performed by: STUDENT IN AN ORGANIZED HEALTH CARE EDUCATION/TRAINING PROGRAM

## 2017-09-14 PROCEDURE — 3080F DIAST BP >= 90 MM HG: CPT | Mod: ,,, | Performed by: STUDENT IN AN ORGANIZED HEALTH CARE EDUCATION/TRAINING PROGRAM

## 2017-09-14 PROCEDURE — 1159F MED LIST DOCD IN RCRD: CPT | Mod: ,,, | Performed by: STUDENT IN AN ORGANIZED HEALTH CARE EDUCATION/TRAINING PROGRAM

## 2017-09-14 PROCEDURE — 99204 OFFICE O/P NEW MOD 45 MIN: CPT | Mod: S$PBB,,, | Performed by: STUDENT IN AN ORGANIZED HEALTH CARE EDUCATION/TRAINING PROGRAM

## 2017-09-14 NOTE — PROGRESS NOTES
Subjective:       Patient ID: Azucena Morrison is a 67 y.o. female.    Chief Complaint: No chief complaint on file.  67 y.o. female with complex history of a bowel fistula. Dr. Horton referred over to evaluate her bladder and if it is related to the drainage from her wound. I will admit that I was unable to find this in the documentation however the hospital  who is here with me today was present at the visit and said that Dr. Horton wanted her to be seen for her urinary frequency. Also to rule out if any bladder etiology is linked to the drainage from her incision. She notes that the drainage from the incision is much improved. She is happy with her wound clinic visits and feel that the wound is getting better.  DTF (daytime frequency): 5-6x. NTF (nighttime frequency): 6-7x/night. She does not feel like she needs to urinate right now.   Denies dysuria, gross hematuria.   Daily bowel movements - 2-3x day. Denies straining.     LAST PSA  No results found for: PSA, PSADIAG, PSATOTAL, PSAFREE    Lab Results   Component Value Date    CREATININE 1.1 2017       ---  Past Medical History:   Diagnosis Date    Diabetes mellitus     Hyperlipemia     Hypertension     Kidney stone        Past Surgical History:   Procedure Laterality Date     SECTION, CLASSIC      CHOLECYSTECTOMY      COLON SURGERY      \[facility] [place] [facility]\""    COLOSTOMY Left     INCISIONAL HERNIA REPAIR Right 2010       History reviewed. No pertinent family history.    Social History   Substance Use Topics    Smoking status: Never Smoker    Smokeless tobacco: Never Used    Alcohol use No       Current Outpatient Prescriptions on File Prior to Visit   Medication Sig Dispense Refill    acetaminophen (TYLENOL) 325 MG tablet Take 2 tablets (650 mg total) by mouth every 8 (eight) hours as needed.  0    amoxicillin-clavulanate 875-125mg (AUGMENTIN) 875-125 mg per tablet Take 1 tablet by mouth 2 (two) times daily. 60 tablet 1     gentamicin (GARAMYCIN) 0.1 % cream Apply topically 3 (three) times daily. Apply locally three times per week 30 g 1    glyBURIDE (DIABETA) 5 MG tablet       hydrocodone-acetaminophen 5-325mg (NORCO) 5-325 mg per tablet Take 1 tablet by mouth every 4 (four) hours as needed for Pain. 32 tablet 0    lisinopril 10 MG tablet Take 10 mg by mouth once daily.      metformin (GLUCOPHAGE) 1000 MG tablet       metformin (GLUCOPHAGE) 500 MG tablet Take 1,000 mg by mouth 2 (two) times daily with meals.      methocarbamol (ROBAXIN) 750 MG Tab Take 1 tablet (750 mg total) by mouth 2 (two) times daily. 60 tablet 0    mupirocin (BACTROBAN) 2 % ointment       ondansetron (ZOFRAN) 4 MG tablet Take 2 tablets (8 mg total) by mouth every 8 (eight) hours as needed for Nausea. 14 tablet 0    oxycodone-acetaminophen (PERCOCET)  mg per tablet Take 1 tablet by mouth every 4 (four) hours as needed for Pain. 32 tablet 0    pravastatin (PRAVACHOL) 20 MG tablet Take 1 tablet (20 mg total) by mouth once daily. 90 tablet 3    TRUEPLUS LANCETS 26 gauge Misc       TRUETEST TEST STRIPS Strp       zolpidem (AMBIEN) 5 MG Tab Take 1 tablet (5 mg total) by mouth nightly as needed. 30 tablet 0     Current Facility-Administered Medications on File Prior to Visit   Medication Dose Route Frequency Provider Last Rate Last Dose    amoxicillin-clavulanate 875-125mg per tablet 1 tablet  1 tablet Oral Q12H Ulisses Horton MD           Review of patient's allergies indicates:   Allergen Reactions    Chlorhexidine gluconate Rash     Chlorhexidine/alcohol wipes. Rash and blistering.       Review of Systems   Constitutional: Negative for activity change.   HENT: Negative for congestion.    Eyes: Negative for visual disturbance.   Respiratory: Negative for shortness of breath.    Cardiovascular: Negative for chest pain.   Gastrointestinal: Negative for constipation.   Musculoskeletal: Negative for gait problem.   Skin: Negative for color  change.   Neurological: Negative for dizziness.   Psychiatric/Behavioral: Negative for agitation.       Objective:      Physical Exam   Constitutional: She is oriented to person, place, and time. She appears well-developed.   HENT:   Head: Normocephalic and atraumatic.   Eyes: EOM are normal.   Neck: Normal range of motion.   Cardiovascular: Intact distal pulses.    Pulmonary/Chest: Effort normal.   Abdominal:   Abdomen was nondistended and nontender on exam. Large medipore dressing over midline wound measuring approximately 1.5 x 1cm which appears to be healing well. No purulence identified. Not enough drainage for me to collect to send to the lab. I was not able to express any drainage.    Musculoskeletal: Normal range of motion.   Neurological: She is alert and oriented to person, place, and time.   Skin: Skin is warm and dry.   Psychiatric: She has a normal mood and affect.       Assessment:       1. Small bowel fistula    2. Urinary frequency    3. Wound drainage        Plan:       1. The patient's wound is slowly healing, there was not enough drainage for me to collect. The patient notes that the drainage is lessening. If Dr. Horton is able to, can collect drainage and send to the lab for a fluid creatinine (not serum). Usually if the drainage contains urine, the Creatinine will be elevated.   2. In order to rule out any abnormal fistula, I believe the best initial study would be a cystogram to be performed in fluoro. This would require a temporary muir catheter inserted only for the study. Contrast would be instilled into the bladder, images taken at max fill. Then after the muir is drained, images should be taken afterwards.    3. I would also typically obtain a postvoid residual however given the location of the patient's wound, I do not want to increase infection risk by placing the ultrasound probe near the wound at this time.  4. After wound heals, and if cystogram is normal. I would like to establish  a PVR. We can consider initiating a medication for urinary frequency then.    Small bowel fistula  -     FL Cystogram Minimum 3 Views; Future; Expected date: 09/21/2017    Urinary frequency    Wound drainage

## 2017-09-14 NOTE — LETTER
September 14, 2017        No Recipients             [facility]  [address] [place] [address]  [place] [address]  Phone: [phone]   Patient: Azucena Morrison   MR Number: [id number]   YOB: 1950   Date of Visit: 9/14/2017     Dear  No Recipients,     {WILDCARD:[id number]}    Sincerely,      Jennifer Rivero MD            CC  No Recipients    Enclosure

## 2017-09-14 NOTE — LETTER
September 14, 2017        [facility]  [address] [place] [address] Ave  [place] [address]  Phone: [phone]   Patient: Azucena Morrison   MR Number: [id number]   YOB: 1950   Date of Visit: 9/14/2017       Dear Dr. Horton:    Thank you for referring Azucena Morrison to me for evaluation. Attached you will find relevant portions of my assessment and plan of care.    If you have questions, please do not hesitate to call me. I look forward to following Azucena Morrison along with you.    Sincerely,      Jennifer Rivero MD            CC  No Recipients    Enclosure

## 2017-09-20 ENCOUNTER — TELEPHONE (OUTPATIENT)
Dept: UROLOGY | Facility: CLINIC | Age: 67
End: 2017-09-20

## 2017-09-20 ENCOUNTER — HOSPITAL ENCOUNTER (OUTPATIENT)
Dept: RADIOLOGY | Facility: HOSPITAL | Age: 67
Discharge: HOME | End: 2017-09-20
Attending: STUDENT IN AN ORGANIZED HEALTH CARE EDUCATION/TRAINING PROGRAM
Payer: MEDICARE

## 2017-09-20 ENCOUNTER — HOSPITAL ENCOUNTER (OUTPATIENT)
Dept: WOUND CARE | Facility: HOSPITAL | Age: 67
Discharge: HOME | End: 2017-09-20
Attending: SURGERY
Payer: MEDICARE

## 2017-09-20 VITALS
DIASTOLIC BLOOD PRESSURE: 92 MMHG | HEART RATE: 91 BPM | TEMPERATURE: 98 F | HEIGHT: 62 IN | WEIGHT: 169 LBS | SYSTOLIC BLOOD PRESSURE: 167 MMHG | BODY MASS INDEX: 31.1 KG/M2

## 2017-09-20 DIAGNOSIS — L08.9 UNSPECIFIED LOCAL INFECTION OF SKIN AND SUBCUTANEOUS TISSUE: ICD-10-CM

## 2017-09-20 DIAGNOSIS — K63.2 SMALL BOWEL FISTULA: ICD-10-CM

## 2017-09-20 DIAGNOSIS — K63.2 ENTERO-ENTERIC FISTULA: Primary | ICD-10-CM

## 2017-09-20 PROCEDURE — 25500020 PHARM REV CODE 255: Performed by: STUDENT IN AN ORGANIZED HEALTH CARE EDUCATION/TRAINING PROGRAM

## 2017-09-20 PROCEDURE — 99213 OFFICE O/P EST LOW 20 MIN: CPT | Mod: 25

## 2017-09-20 PROCEDURE — 74430 CONTRAST X-RAY BLADDER: CPT | Mod: 26,,, | Performed by: RADIOLOGY

## 2017-09-20 PROCEDURE — 74430 CONTRAST X-RAY BLADDER: CPT | Mod: TC

## 2017-09-20 RX ADMIN — IOTHALAMATE MEGLUMINE 250 ML: 172 INJECTION URETERAL at 12:09

## 2017-09-20 NOTE — TELEPHONE ENCOUNTER
----- Message from Jennifer Rivero MD sent at 9/20/2017  1:24 PM CDT -----  Have patient followup with me to discuss results of cystogram study performed today.

## 2017-09-21 ENCOUNTER — OFFICE VISIT (OUTPATIENT)
Dept: UROLOGY | Facility: CLINIC | Age: 67
End: 2017-09-21
Payer: MEDICARE

## 2017-09-21 VITALS
WEIGHT: 169 LBS | SYSTOLIC BLOOD PRESSURE: 164 MMHG | HEART RATE: 106 BPM | HEIGHT: 62 IN | BODY MASS INDEX: 31.1 KG/M2 | DIASTOLIC BLOOD PRESSURE: 97 MMHG

## 2017-09-21 DIAGNOSIS — T14.8XXA DRAINAGE FROM WOUND: Primary | ICD-10-CM

## 2017-09-21 DIAGNOSIS — R35.0 URINE FREQUENCY: ICD-10-CM

## 2017-09-21 DIAGNOSIS — Z98.890 S/P EXPLORATORY LAPAROTOMY: ICD-10-CM

## 2017-09-21 PROCEDURE — 3080F DIAST BP >= 90 MM HG: CPT | Mod: ,,, | Performed by: STUDENT IN AN ORGANIZED HEALTH CARE EDUCATION/TRAINING PROGRAM

## 2017-09-21 PROCEDURE — 99212 OFFICE O/P EST SF 10 MIN: CPT | Mod: PBBFAC,PO | Performed by: STUDENT IN AN ORGANIZED HEALTH CARE EDUCATION/TRAINING PROGRAM

## 2017-09-21 PROCEDURE — 99213 OFFICE O/P EST LOW 20 MIN: CPT | Mod: S$PBB,,, | Performed by: STUDENT IN AN ORGANIZED HEALTH CARE EDUCATION/TRAINING PROGRAM

## 2017-09-21 PROCEDURE — 1126F AMNT PAIN NOTED NONE PRSNT: CPT | Mod: ,,, | Performed by: STUDENT IN AN ORGANIZED HEALTH CARE EDUCATION/TRAINING PROGRAM

## 2017-09-21 PROCEDURE — 1159F MED LIST DOCD IN RCRD: CPT | Mod: ,,, | Performed by: STUDENT IN AN ORGANIZED HEALTH CARE EDUCATION/TRAINING PROGRAM

## 2017-09-21 PROCEDURE — 99999 PR PBB SHADOW E&M-EST. PATIENT-LVL II: CPT | Mod: PBBFAC,,, | Performed by: STUDENT IN AN ORGANIZED HEALTH CARE EDUCATION/TRAINING PROGRAM

## 2017-09-21 PROCEDURE — 3077F SYST BP >= 140 MM HG: CPT | Mod: ,,, | Performed by: STUDENT IN AN ORGANIZED HEALTH CARE EDUCATION/TRAINING PROGRAM

## 2017-09-21 NOTE — PROGRESS NOTES
Subjective:       Patient ID: Azucena Morrison is a 67 y.o. female.    Chief Complaint: Here for Results  67 y.o. female with complex history of a bowel fistula. Dr. Horton referred over to evaluate her bladder and if it is related to the drainage from her wound as well as to be evaluate for her urinary frequency.   Previously DTF (daytime frequency): 5-6x. NTF (nighttime frequency): 6-7x/night. She reports to me that last night her nocturia decreased to 2-3x/night. She was very happy about this.   Daily bowel movements - 2-3x day. Denies straining.     She notes she is still going to wound care frequently. The drainage from the wound has lessened greatly.     Lab Results   Component Value Date    CREATININE 1.1 2017     I personally reviewed the images: cystogram 17. Negative for any abnormal fistula. Bladder appears normal in contour. After the cystograffin was drained, no contrast persisted.     ---  Past Medical History:   Diagnosis Date    Diabetes mellitus     Hyperlipemia     Hypertension     Kidney stone        Past Surgical History:   Procedure Laterality Date     SECTION, CLASSIC      CHOLECYSTECTOMY      [facility]    COLOSTOMY Left     INCISIONAL HERNIA REPAIR Right        No family history on file.    Social History   Substance Use Topics    Smoking status: Never Smoker    Smokeless tobacco: Never Used    Alcohol use No       Current Outpatient Prescriptions on File Prior to Visit   Medication Sig Dispense Refill    acetaminophen (TYLENOL) 325 MG tablet Take 2 tablets (650 mg total) by mouth every 8 (eight) hours as needed.  0    amoxicillin-clavulanate 875-125mg (AUGMENTIN) 875-125 mg per tablet Take 1 tablet by mouth 2 (two) times daily. 60 tablet 1    gentamicin (GARAMYCIN) 0.1 % cream Apply topically 3 (three) times daily. Apply locally three times per week 30 g 1    glyBURIDE (DIABETA) 5 MG tablet       hydrocodone-acetaminophen  5-325mg (NORCO) 5-325 mg per tablet Take 1 tablet by mouth every 4 (four) hours as needed for Pain. 32 tablet 0    lisinopril 10 MG tablet Take 10 mg by mouth once daily.      metformin (GLUCOPHAGE) 1000 MG tablet       metformin (GLUCOPHAGE) 500 MG tablet Take 1,000 mg by mouth 2 (two) times daily with meals.      methocarbamol (ROBAXIN) 750 MG Tab Take 1 tablet (750 mg total) by mouth 2 (two) times daily. 60 tablet 0    mupirocin (BACTROBAN) 2 % ointment       ondansetron (ZOFRAN) 4 MG tablet Take 2 tablets (8 mg total) by mouth every 8 (eight) hours as needed for Nausea. 14 tablet 0    oxycodone-acetaminophen (PERCOCET)  mg per tablet Take 1 tablet by mouth every 4 (four) hours as needed for Pain. 32 tablet 0    pravastatin (PRAVACHOL) 20 MG tablet Take 1 tablet (20 mg total) by mouth once daily. 90 tablet 3    TRUEPLUS LANCETS 26 gauge Misc       TRUETEST TEST STRIPS Strp       zolpidem (AMBIEN) 5 MG Tab Take 1 tablet (5 mg total) by mouth nightly as needed. 30 tablet 0     Current Facility-Administered Medications on File Prior to Visit   Medication Dose Route Frequency Provider Last Rate Last Dose    amoxicillin-clavulanate 875-125mg per tablet 1 tablet  1 tablet Oral Q12H Ulisses Horton MD        [COMPLETED] iothalamate meglumine 17.2 % solution 250 mL  250 mL Bladder Instillation ONCE PRN Jennifer Rivero MD   250 mL at 09/20/17 1242       Review of patient's allergies indicates:   Allergen Reactions    Chlorhexidine gluconate Rash     Chlorhexidine/alcohol wipes. Rash and blistering.       Review of Systems   Constitutional: Negative for activity change.   HENT: Negative for congestion.    Eyes: Negative for visual disturbance.   Respiratory: Negative for shortness of breath.    Cardiovascular: Negative for chest pain.   Gastrointestinal: Negative for abdominal distention.   Musculoskeletal: Negative for gait problem.   Skin: Negative for color change.   Neurological: Negative for  dizziness.   Psychiatric/Behavioral: Negative for agitation.       Objective:      Physical Exam   Constitutional: She is oriented to person, place, and time. She appears well-developed and well-nourished.   HENT:   Head: Normocephalic.   Eyes: EOM are normal.   Neck: Normal range of motion.   Abdominal: Soft. She exhibits no distension. There is no tenderness.   Dressing over midline wound, no seepage. Dressing is clean. I did not remove the dressing today.    Neurological: She is alert and oriented to person, place, and time.   Skin: Skin is warm and dry.       Assessment:       1. Drainage from wound    2. Urine frequency    3. S/P exploratory laparotomy        Plan:       1. We will continue to monitor her LUTS of frequency and nocturia. She notes this has already begun to improve without any intervention as of late. I would recommend that she continue to monitor and if no improvement after her wound has completely healed she can return to me for evaluation. We can check a PVR and evaluate the need for possible anticholinergics.   2. Cystogram images were reviewed by me personally. I shared the wonderful result with this patient that the cystogram was negative for any adverse findings and showed no evidence of a fistula.   3. She is to continue with her wound care appointments. The drainage has lessened and the wound is healing well.     The entire visit was conducted with the language derrick - Stevo  #[id number]       Drainage from wound    Urine frequency    S/P exploratory laparotomy

## 2017-09-27 ENCOUNTER — HOSPITAL ENCOUNTER (OUTPATIENT)
Dept: WOUND CARE | Facility: HOSPITAL | Age: 67
Discharge: HOME | End: 2017-09-27
Attending: SURGERY
Payer: MEDICARE

## 2017-09-27 VITALS
HEART RATE: 104 BPM | WEIGHT: 169 LBS | DIASTOLIC BLOOD PRESSURE: 84 MMHG | BODY MASS INDEX: 31.1 KG/M2 | HEIGHT: 62 IN | TEMPERATURE: 99 F | SYSTOLIC BLOOD PRESSURE: 158 MMHG

## 2017-09-27 DIAGNOSIS — E11.628 DIABETES WITH SKIN COMPLICATION: ICD-10-CM

## 2017-09-27 DIAGNOSIS — L08.9 UNSPECIFIED LOCAL INFECTION OF SKIN AND SUBCUTANEOUS TISSUE: ICD-10-CM

## 2017-09-27 PROCEDURE — 99213 OFFICE O/P EST LOW 20 MIN: CPT

## 2017-09-27 RX ORDER — SULFAMETHOXAZOLE AND TRIMETHOPRIM 400; 80 MG/1; MG/1
1 TABLET ORAL 2 TIMES DAILY
Qty: 60 TABLET | Refills: 1 | Status: SHIPPED | OUTPATIENT
Start: 2017-09-27 | End: 2017-09-27 | Stop reason: SDUPTHER

## 2017-09-27 RX ORDER — SULFAMETHOXAZOLE AND TRIMETHOPRIM 400; 80 MG/1; MG/1
1 TABLET ORAL 2 TIMES DAILY
Qty: 60 TABLET | Refills: 1 | Status: SHIPPED | OUTPATIENT
Start: 2017-09-27 | End: 2017-11-01 | Stop reason: SDUPTHER

## 2017-10-04 ENCOUNTER — HOSPITAL ENCOUNTER (OUTPATIENT)
Dept: WOUND CARE | Facility: HOSPITAL | Age: 67
Discharge: HOME | End: 2017-10-04
Attending: SURGERY
Payer: MEDICARE

## 2017-10-04 VITALS
HEIGHT: 62 IN | HEART RATE: 107 BPM | BODY MASS INDEX: 31.1 KG/M2 | DIASTOLIC BLOOD PRESSURE: 83 MMHG | WEIGHT: 169 LBS | SYSTOLIC BLOOD PRESSURE: 135 MMHG | TEMPERATURE: 98 F

## 2017-10-04 DIAGNOSIS — E11.9 TYPE 2 DIABETES MELLITUS WITHOUT COMPLICATION, UNSPECIFIED LONG TERM INSULIN USE STATUS: Primary | ICD-10-CM

## 2017-10-04 DIAGNOSIS — T14.8XXA DRAINAGE FROM WOUND: ICD-10-CM

## 2017-10-04 DIAGNOSIS — K63.2 ENTERO-ENTERIC FISTULA: ICD-10-CM

## 2017-10-04 PROCEDURE — 99213 OFFICE O/P EST LOW 20 MIN: CPT

## 2017-10-11 ENCOUNTER — HOSPITAL ENCOUNTER (OUTPATIENT)
Dept: WOUND CARE | Facility: HOSPITAL | Age: 67
Discharge: HOME | End: 2017-10-11
Attending: SURGERY
Payer: MEDICARE

## 2017-10-11 VITALS — TEMPERATURE: 98 F | DIASTOLIC BLOOD PRESSURE: 94 MMHG | HEART RATE: 90 BPM | SYSTOLIC BLOOD PRESSURE: 172 MMHG

## 2017-10-11 DIAGNOSIS — E11.9 TYPE 2 DIABETES MELLITUS WITHOUT COMPLICATION, UNSPECIFIED LONG TERM INSULIN USE STATUS: Primary | ICD-10-CM

## 2017-10-11 DIAGNOSIS — L02.211 ABSCESS OF ABDOMINAL WALL: ICD-10-CM

## 2017-10-11 PROCEDURE — 99213 OFFICE O/P EST LOW 20 MIN: CPT

## 2017-10-18 ENCOUNTER — HOSPITAL ENCOUNTER (OUTPATIENT)
Dept: WOUND CARE | Facility: HOSPITAL | Age: 67
Discharge: HOME | End: 2017-10-18
Attending: SURGERY
Payer: MEDICARE

## 2017-10-18 VITALS
DIASTOLIC BLOOD PRESSURE: 80 MMHG | BODY MASS INDEX: 31.1 KG/M2 | WEIGHT: 169 LBS | SYSTOLIC BLOOD PRESSURE: 144 MMHG | HEIGHT: 62 IN | TEMPERATURE: 98 F | HEART RATE: 104 BPM

## 2017-10-18 PROCEDURE — 99213 OFFICE O/P EST LOW 20 MIN: CPT

## 2017-10-18 RX ORDER — AMOXICILLIN AND CLAVULANATE POTASSIUM 875; 125 MG/1; MG/1
1 TABLET, FILM COATED ORAL 2 TIMES DAILY
Qty: 60 TABLET | Refills: 1 | Status: SHIPPED | OUTPATIENT
Start: 2017-10-18 | End: 2017-11-22 | Stop reason: SDUPTHER

## 2017-10-25 ENCOUNTER — HOSPITAL ENCOUNTER (OUTPATIENT)
Dept: WOUND CARE | Facility: HOSPITAL | Age: 67
Discharge: HOME | End: 2017-10-25
Attending: SURGERY
Payer: MEDICARE

## 2017-10-25 VITALS
SYSTOLIC BLOOD PRESSURE: 156 MMHG | WEIGHT: 169 LBS | TEMPERATURE: 98 F | HEIGHT: 62 IN | BODY MASS INDEX: 31.1 KG/M2 | HEART RATE: 90 BPM | DIASTOLIC BLOOD PRESSURE: 83 MMHG

## 2017-10-25 DIAGNOSIS — E11.9 TYPE 2 DIABETES MELLITUS WITHOUT COMPLICATION, UNSPECIFIED LONG TERM INSULIN USE STATUS: Primary | ICD-10-CM

## 2017-10-25 DIAGNOSIS — L02.211 ABSCESS OF ABDOMINAL WALL: ICD-10-CM

## 2017-10-25 PROCEDURE — 99213 OFFICE O/P EST LOW 20 MIN: CPT

## 2017-10-25 PROCEDURE — 97605 NEG PRS WND THER DME<=50SQCM: CPT

## 2017-10-25 RX ORDER — ONDANSETRON 8 MG/1
8 TABLET, ORALLY DISINTEGRATING ORAL ONCE
Qty: 30 TABLET | Refills: 0 | Status: SHIPPED | OUTPATIENT
Start: 2017-10-25 | End: 2017-10-25

## 2017-10-25 NOTE — PROGRESS NOTES
Much of the documentation for this visit was completed in the [facility] system. Please see the attached documentation for further details about this patient's care.     Gaby Munoz LPN

## 2017-11-01 ENCOUNTER — HOSPITAL ENCOUNTER (OUTPATIENT)
Dept: WOUND CARE | Facility: HOSPITAL | Age: 67
Discharge: HOME | End: 2017-11-01
Attending: SURGERY
Payer: MEDICARE

## 2017-11-01 VITALS
WEIGHT: 139 LBS | HEART RATE: 89 BPM | TEMPERATURE: 98 F | BODY MASS INDEX: 25.58 KG/M2 | HEIGHT: 62 IN | SYSTOLIC BLOOD PRESSURE: 139 MMHG | DIASTOLIC BLOOD PRESSURE: 107 MMHG

## 2017-11-01 PROCEDURE — 97605 NEG PRS WND THER DME<=50SQCM: CPT

## 2017-11-01 RX ORDER — SULFAMETHOXAZOLE AND TRIMETHOPRIM 400; 80 MG/1; MG/1
1 TABLET ORAL 2 TIMES DAILY
Qty: 60 TABLET | Refills: 1 | Status: SHIPPED | OUTPATIENT
Start: 2017-11-01 | End: 2018-03-14 | Stop reason: SDUPTHER

## 2017-11-08 ENCOUNTER — HOSPITAL ENCOUNTER (OUTPATIENT)
Dept: WOUND CARE | Facility: HOSPITAL | Age: 67
Discharge: HOME | End: 2017-11-08
Attending: SURGERY
Payer: MEDICARE

## 2017-11-08 VITALS
TEMPERATURE: 98 F | HEART RATE: 101 BPM | BODY MASS INDEX: 25.58 KG/M2 | HEIGHT: 62 IN | WEIGHT: 139 LBS | SYSTOLIC BLOOD PRESSURE: 155 MMHG | DIASTOLIC BLOOD PRESSURE: 82 MMHG

## 2017-11-08 DIAGNOSIS — E11.9 TYPE 2 DIABETES MELLITUS WITHOUT COMPLICATION, UNSPECIFIED LONG TERM INSULIN USE STATUS: Primary | ICD-10-CM

## 2017-11-08 DIAGNOSIS — K63.2 ENTERO-ENTERIC FISTULA: ICD-10-CM

## 2017-11-08 PROCEDURE — 97605 NEG PRS WND THER DME<=50SQCM: CPT

## 2017-11-15 ENCOUNTER — HOSPITAL ENCOUNTER (OUTPATIENT)
Dept: WOUND CARE | Facility: HOSPITAL | Age: 67
Discharge: HOME | End: 2017-11-15
Attending: SURGERY
Payer: MEDICARE

## 2017-11-15 VITALS — HEART RATE: 108 BPM | DIASTOLIC BLOOD PRESSURE: 83 MMHG | SYSTOLIC BLOOD PRESSURE: 138 MMHG | TEMPERATURE: 98 F

## 2017-11-15 PROCEDURE — 97605 NEG PRS WND THER DME<=50SQCM: CPT

## 2017-11-22 ENCOUNTER — HOSPITAL ENCOUNTER (OUTPATIENT)
Dept: WOUND CARE | Facility: HOSPITAL | Age: 67
Discharge: HOME | End: 2017-11-22
Attending: SURGERY
Payer: MEDICARE

## 2017-11-22 ENCOUNTER — TELEPHONE (OUTPATIENT)
Dept: ADMINISTRATIVE | Facility: CLINIC | Age: 67
End: 2017-11-22

## 2017-11-22 VITALS
BODY MASS INDEX: 25.58 KG/M2 | DIASTOLIC BLOOD PRESSURE: 76 MMHG | WEIGHT: 139 LBS | SYSTOLIC BLOOD PRESSURE: 138 MMHG | TEMPERATURE: 98 F | HEIGHT: 62 IN | HEART RATE: 89 BPM

## 2017-11-22 DIAGNOSIS — L02.211 ABSCESS OF ABDOMINAL WALL: ICD-10-CM

## 2017-11-22 DIAGNOSIS — E11.9 TYPE 2 DIABETES MELLITUS WITHOUT COMPLICATION, UNSPECIFIED LONG TERM INSULIN USE STATUS: Primary | ICD-10-CM

## 2017-11-22 PROCEDURE — 97605 NEG PRS WND THER DME<=50SQCM: CPT

## 2017-11-22 RX ORDER — AMOXICILLIN AND CLAVULANATE POTASSIUM 875; 125 MG/1; MG/1
1 TABLET, FILM COATED ORAL 2 TIMES DAILY
Qty: 60 TABLET | Refills: 1 | Status: SHIPPED | OUTPATIENT
Start: 2017-11-22 | End: 2018-02-28

## 2017-11-29 ENCOUNTER — HOSPITAL ENCOUNTER (OUTPATIENT)
Dept: WOUND CARE | Facility: HOSPITAL | Age: 67
Discharge: HOME | End: 2017-11-29
Attending: SURGERY
Payer: MEDICARE

## 2017-11-29 VITALS — SYSTOLIC BLOOD PRESSURE: 139 MMHG | TEMPERATURE: 98 F | DIASTOLIC BLOOD PRESSURE: 82 MMHG | HEART RATE: 91 BPM

## 2017-11-29 DIAGNOSIS — E11.9 TYPE 2 DIABETES MELLITUS WITHOUT COMPLICATION, UNSPECIFIED LONG TERM INSULIN USE STATUS: Primary | ICD-10-CM

## 2017-11-29 DIAGNOSIS — K63.2 FISTULA OF INTESTINE, EXCLUDING RECTUM AND ANUS: ICD-10-CM

## 2017-11-29 PROCEDURE — 97605 NEG PRS WND THER DME<=50SQCM: CPT

## 2017-12-05 ENCOUNTER — TELEPHONE (OUTPATIENT)
Dept: ADMINISTRATIVE | Facility: CLINIC | Age: 67
End: 2017-12-05

## 2017-12-06 ENCOUNTER — HOSPITAL ENCOUNTER (OUTPATIENT)
Dept: WOUND CARE | Facility: HOSPITAL | Age: 67
Discharge: HOME | End: 2017-12-06
Attending: SURGERY
Payer: MEDICARE

## 2017-12-06 VITALS — SYSTOLIC BLOOD PRESSURE: 160 MMHG | HEART RATE: 77 BPM | TEMPERATURE: 98 F | DIASTOLIC BLOOD PRESSURE: 80 MMHG

## 2017-12-06 DIAGNOSIS — K63.2 FISTULA OF INTESTINE, EXCLUDING RECTUM AND ANUS: ICD-10-CM

## 2017-12-06 DIAGNOSIS — E11.9 TYPE 2 DIABETES MELLITUS WITHOUT COMPLICATION, UNSPECIFIED LONG TERM INSULIN USE STATUS: Primary | ICD-10-CM

## 2017-12-06 PROCEDURE — 97605 NEG PRS WND THER DME<=50SQCM: CPT

## 2017-12-13 ENCOUNTER — HOSPITAL ENCOUNTER (OUTPATIENT)
Dept: WOUND CARE | Facility: HOSPITAL | Age: 67
Discharge: HOME | End: 2017-12-13
Attending: EMERGENCY MEDICINE
Payer: MEDICARE

## 2017-12-13 ENCOUNTER — TELEPHONE (OUTPATIENT)
Dept: ADMINISTRATIVE | Facility: CLINIC | Age: 67
End: 2017-12-13

## 2017-12-13 VITALS
WEIGHT: 139 LBS | DIASTOLIC BLOOD PRESSURE: 80 MMHG | SYSTOLIC BLOOD PRESSURE: 141 MMHG | TEMPERATURE: 98 F | HEIGHT: 62 IN | HEART RATE: 86 BPM | BODY MASS INDEX: 25.58 KG/M2

## 2017-12-13 PROCEDURE — 97605 NEG PRS WND THER DME<=50SQCM: CPT

## 2017-12-13 NOTE — PROGRESS NOTES
Much of the documentation for this visit was completed in the [facility] system. Please see the attached documentation for further details about this patient's care.     Millie Currie RN

## 2017-12-15 ENCOUNTER — TELEPHONE (OUTPATIENT)
Dept: ADMINISTRATIVE | Facility: CLINIC | Age: 67
End: 2017-12-15

## 2017-12-20 ENCOUNTER — HOSPITAL ENCOUNTER (OUTPATIENT)
Dept: WOUND CARE | Facility: HOSPITAL | Age: 67
Discharge: HOME | End: 2017-12-20
Attending: SURGERY
Payer: MEDICARE

## 2017-12-20 VITALS
SYSTOLIC BLOOD PRESSURE: 164 MMHG | WEIGHT: 139 LBS | BODY MASS INDEX: 25.58 KG/M2 | HEART RATE: 89 BPM | TEMPERATURE: 99 F | DIASTOLIC BLOOD PRESSURE: 86 MMHG | HEIGHT: 62 IN

## 2017-12-20 DIAGNOSIS — K63.2 FISTULA OF INTESTINE, EXCLUDING RECTUM AND ANUS: ICD-10-CM

## 2017-12-20 DIAGNOSIS — E11.9 TYPE 2 DIABETES MELLITUS WITHOUT COMPLICATION, UNSPECIFIED LONG TERM INSULIN USE STATUS: Primary | ICD-10-CM

## 2017-12-20 PROCEDURE — 99213 OFFICE O/P EST LOW 20 MIN: CPT

## 2018-01-03 ENCOUNTER — HOSPITAL ENCOUNTER (OUTPATIENT)
Dept: WOUND CARE | Facility: HOSPITAL | Age: 68
Discharge: HOME | End: 2018-01-03
Attending: SURGERY
Payer: MEDICARE

## 2018-01-03 VITALS
HEIGHT: 62 IN | DIASTOLIC BLOOD PRESSURE: 83 MMHG | SYSTOLIC BLOOD PRESSURE: 162 MMHG | WEIGHT: 139 LBS | TEMPERATURE: 98 F | BODY MASS INDEX: 25.58 KG/M2 | HEART RATE: 87 BPM

## 2018-01-03 DIAGNOSIS — I10 ESSENTIAL HYPERTENSION: ICD-10-CM

## 2018-01-03 DIAGNOSIS — E11.9 TYPE 2 DIABETES MELLITUS WITHOUT COMPLICATION, UNSPECIFIED LONG TERM INSULIN USE STATUS: ICD-10-CM

## 2018-01-03 DIAGNOSIS — Z98.890 S/P EXPLORATORY LAPAROTOMY: Primary | ICD-10-CM

## 2018-01-03 DIAGNOSIS — T81.89XA SUTURE GRANULOMA, INITIAL ENCOUNTER: ICD-10-CM

## 2018-01-03 PROCEDURE — 99214 OFFICE O/P EST MOD 30 MIN: CPT

## 2018-01-03 PROCEDURE — 87186 SC STD MICRODIL/AGAR DIL: CPT

## 2018-01-03 PROCEDURE — 87075 CULTR BACTERIA EXCEPT BLOOD: CPT

## 2018-01-03 PROCEDURE — 87076 CULTURE ANAEROBE IDENT EACH: CPT

## 2018-01-03 PROCEDURE — 87070 CULTURE OTHR SPECIMN AEROBIC: CPT

## 2018-01-03 PROCEDURE — 87077 CULTURE AEROBIC IDENTIFY: CPT

## 2018-01-06 LAB — BACTERIA SPEC AEROBE CULT: NORMAL

## 2018-01-08 LAB — BACTERIA SPEC ANAEROBE CULT: NORMAL

## 2018-01-10 ENCOUNTER — HOSPITAL ENCOUNTER (OUTPATIENT)
Dept: WOUND CARE | Facility: HOSPITAL | Age: 68
Discharge: HOME | End: 2018-01-10
Attending: SURGERY
Payer: MEDICARE

## 2018-01-10 VITALS
TEMPERATURE: 98 F | HEART RATE: 86 BPM | HEIGHT: 62 IN | SYSTOLIC BLOOD PRESSURE: 165 MMHG | DIASTOLIC BLOOD PRESSURE: 96 MMHG | WEIGHT: 139 LBS | BODY MASS INDEX: 25.58 KG/M2

## 2018-01-10 PROCEDURE — 99213 OFFICE O/P EST LOW 20 MIN: CPT

## 2018-01-15 ENCOUNTER — TELEPHONE (OUTPATIENT)
Dept: ADMINISTRATIVE | Facility: CLINIC | Age: 68
End: 2018-01-15

## 2018-01-15 NOTE — PATIENT INSTRUCTIONS
Home Health notifying:    REASON FOR CALL:     WITH SN VISIT ON 01/12/18 PATIENT REPORTS CHANGING OUTER DRESSING YESTERDAY DUE TO DRAINAGE SEEPING FROM SIDES. PATIENT REPORTS INNER DRESSING HAD COME OUT, OR DISSOLVED. SN REMOVED OUTER DRESSING. NO INNER DRESSING NOTED, SN ABLE TO VISUALIZE PINK TISDUE TO PROXIMAL AND DISTAL OPEN WOUNDS. DRESSED PER MD CURRENT ORDERS OF 4X4 GAUZE, DRAWTEX, MEPILEX BORDER.       Thanks

## 2018-01-17 ENCOUNTER — TELEPHONE (OUTPATIENT)
Dept: ADMINISTRATIVE | Facility: CLINIC | Age: 68
End: 2018-01-17

## 2018-01-17 NOTE — PATIENT INSTRUCTIONS
Home health notifying doctor of :    With scheduled woundcare    Sn removed outer dressing, no inner dressing noted. Open tissue is pink and has now reopened in the middle to return to single open wound measuring 1.7cmx0.7cmx2.3cm. Patient reports removing outer dressing on day before Sn visit due to amount of drainage.       Please inform of any new orders    Thanks,    Ashley Moreno    [phone]

## 2018-01-24 ENCOUNTER — HOSPITAL ENCOUNTER (OUTPATIENT)
Dept: WOUND CARE | Facility: HOSPITAL | Age: 68
Discharge: HOME | End: 2018-01-24
Attending: SURGERY
Payer: MEDICARE

## 2018-01-24 VITALS
HEART RATE: 80 BPM | DIASTOLIC BLOOD PRESSURE: 78 MMHG | HEIGHT: 62 IN | TEMPERATURE: 98 F | WEIGHT: 139 LBS | SYSTOLIC BLOOD PRESSURE: 152 MMHG | BODY MASS INDEX: 25.58 KG/M2

## 2018-01-24 DIAGNOSIS — E11.628 DIABETES WITH SKIN COMPLICATION: ICD-10-CM

## 2018-01-24 DIAGNOSIS — E11.622 TYPE 2 DIABETES MELLITUS WITH OTHER SKIN ULCER, UNSPECIFIED LONG TERM INSULIN USE STATUS: ICD-10-CM

## 2018-01-24 DIAGNOSIS — R00.0 TACHYCARDIA: ICD-10-CM

## 2018-01-24 DIAGNOSIS — Z93.3 S/P COLOSTOMY: ICD-10-CM

## 2018-01-24 DIAGNOSIS — E11.622 TYPE 2 DIABETES MELLITUS WITH OTHER SKIN ULCER, WITH LONG-TERM CURRENT USE OF INSULIN: ICD-10-CM

## 2018-01-24 DIAGNOSIS — L98.499 TYPE 2 DIABETES MELLITUS WITH OTHER SKIN ULCER, UNSPECIFIED LONG TERM INSULIN USE STATUS: ICD-10-CM

## 2018-01-24 DIAGNOSIS — Z98.890 S/P EXPLORATORY LAPAROTOMY: Primary | ICD-10-CM

## 2018-01-24 DIAGNOSIS — Z79.4 TYPE 2 DIABETES MELLITUS WITH OTHER SKIN ULCER, WITH LONG-TERM CURRENT USE OF INSULIN: ICD-10-CM

## 2018-01-24 DIAGNOSIS — L02.211 ABSCESS OF ABDOMINAL WALL: ICD-10-CM

## 2018-01-24 DIAGNOSIS — I10 ESSENTIAL HYPERTENSION: ICD-10-CM

## 2018-01-24 DIAGNOSIS — K63.2 FISTULA OF INTESTINE, EXCLUDING RECTUM AND ANUS: ICD-10-CM

## 2018-01-24 PROCEDURE — 99213 OFFICE O/P EST LOW 20 MIN: CPT

## 2018-01-30 ENCOUNTER — HOSPITAL ENCOUNTER (OUTPATIENT)
Dept: RADIOLOGY | Facility: HOSPITAL | Age: 68
Discharge: HOME | End: 2018-01-30
Attending: SURGERY
Payer: MEDICARE

## 2018-01-30 DIAGNOSIS — E11.622 TYPE 2 DIABETES MELLITUS WITH OTHER SKIN ULCER, UNSPECIFIED LONG TERM INSULIN USE STATUS: ICD-10-CM

## 2018-01-30 DIAGNOSIS — L98.499 TYPE 2 DIABETES MELLITUS WITH OTHER SKIN ULCER, UNSPECIFIED LONG TERM INSULIN USE STATUS: ICD-10-CM

## 2018-01-30 DIAGNOSIS — Z93.3 S/P COLOSTOMY: ICD-10-CM

## 2018-01-30 DIAGNOSIS — R00.0 TACHYCARDIA: ICD-10-CM

## 2018-01-30 DIAGNOSIS — E11.622 TYPE 2 DIABETES MELLITUS WITH OTHER SKIN ULCER, WITH LONG-TERM CURRENT USE OF INSULIN: ICD-10-CM

## 2018-01-30 DIAGNOSIS — E11.628 DIABETES WITH SKIN COMPLICATION: ICD-10-CM

## 2018-01-30 DIAGNOSIS — Z79.4 TYPE 2 DIABETES MELLITUS WITH OTHER SKIN ULCER, WITH LONG-TERM CURRENT USE OF INSULIN: ICD-10-CM

## 2018-01-30 DIAGNOSIS — K63.2 FISTULA OF INTESTINE, EXCLUDING RECTUM AND ANUS: ICD-10-CM

## 2018-01-30 DIAGNOSIS — L02.211 ABSCESS OF ABDOMINAL WALL: ICD-10-CM

## 2018-01-30 PROCEDURE — 25500020 PHARM REV CODE 255: Performed by: SURGERY

## 2018-01-30 PROCEDURE — 20501 NJX SINUS TRACT DIAGNOSTIC: CPT | Mod: ,,, | Performed by: RADIOLOGY

## 2018-01-30 PROCEDURE — 76080 X-RAY EXAM OF FISTULA: CPT | Mod: 26,,, | Performed by: RADIOLOGY

## 2018-01-30 PROCEDURE — 20501 NJX SINUS TRACT DIAGNOSTIC: CPT | Mod: TC

## 2018-01-30 PROCEDURE — 25500020 PHARM REV CODE 255

## 2018-01-30 RX ADMIN — IOHEXOL 150 ML: 350 INJECTION, SOLUTION INTRAVENOUS at 09:01

## 2018-01-31 ENCOUNTER — HOSPITAL ENCOUNTER (OUTPATIENT)
Dept: WOUND CARE | Facility: HOSPITAL | Age: 68
Discharge: HOME | End: 2018-01-31
Attending: SURGERY
Payer: MEDICARE

## 2018-01-31 VITALS
WEIGHT: 139 LBS | HEART RATE: 85 BPM | HEIGHT: 62 IN | BODY MASS INDEX: 25.58 KG/M2 | SYSTOLIC BLOOD PRESSURE: 140 MMHG | DIASTOLIC BLOOD PRESSURE: 70 MMHG | TEMPERATURE: 98 F

## 2018-01-31 DIAGNOSIS — E11.9 TYPE 2 DIABETES MELLITUS WITHOUT COMPLICATION, UNSPECIFIED LONG TERM INSULIN USE STATUS: Primary | ICD-10-CM

## 2018-01-31 DIAGNOSIS — K63.2 FISTULA OF INTESTINE, EXCLUDING RECTUM AND ANUS: ICD-10-CM

## 2018-01-31 PROCEDURE — 99213 OFFICE O/P EST LOW 20 MIN: CPT

## 2018-02-07 ENCOUNTER — HOSPITAL ENCOUNTER (OUTPATIENT)
Dept: WOUND CARE | Facility: HOSPITAL | Age: 68
Discharge: HOME | End: 2018-02-07
Attending: SURGERY
Payer: MEDICARE

## 2018-02-07 VITALS
BODY MASS INDEX: 25.58 KG/M2 | HEIGHT: 62 IN | TEMPERATURE: 98 F | WEIGHT: 139 LBS | HEART RATE: 76 BPM | SYSTOLIC BLOOD PRESSURE: 141 MMHG | DIASTOLIC BLOOD PRESSURE: 81 MMHG

## 2018-02-07 PROCEDURE — 99213 OFFICE O/P EST LOW 20 MIN: CPT

## 2018-02-14 ENCOUNTER — HOSPITAL ENCOUNTER (OUTPATIENT)
Dept: WOUND CARE | Facility: HOSPITAL | Age: 68
Discharge: HOME | End: 2018-02-14
Attending: SURGERY
Payer: MEDICARE

## 2018-02-14 VITALS
BODY MASS INDEX: 25.58 KG/M2 | TEMPERATURE: 99 F | SYSTOLIC BLOOD PRESSURE: 185 MMHG | HEIGHT: 62 IN | WEIGHT: 139 LBS | DIASTOLIC BLOOD PRESSURE: 85 MMHG | HEART RATE: 88 BPM

## 2018-02-14 PROCEDURE — 99214 OFFICE O/P EST MOD 30 MIN: CPT

## 2018-02-21 ENCOUNTER — HOSPITAL ENCOUNTER (OUTPATIENT)
Dept: WOUND CARE | Facility: HOSPITAL | Age: 68
Discharge: HOME | End: 2018-02-21
Attending: SURGERY
Payer: MEDICARE

## 2018-02-21 VITALS — DIASTOLIC BLOOD PRESSURE: 91 MMHG | HEART RATE: 95 BPM | TEMPERATURE: 98 F | SYSTOLIC BLOOD PRESSURE: 179 MMHG

## 2018-02-21 DIAGNOSIS — K63.2 FISTULA OF INTESTINE, EXCLUDING RECTUM AND ANUS: ICD-10-CM

## 2018-02-21 DIAGNOSIS — E11.9 TYPE 2 DIABETES MELLITUS WITHOUT COMPLICATION, UNSPECIFIED LONG TERM INSULIN USE STATUS: Primary | ICD-10-CM

## 2018-02-21 DIAGNOSIS — I10 ESSENTIAL HYPERTENSION: ICD-10-CM

## 2018-02-21 PROCEDURE — 99213 OFFICE O/P EST LOW 20 MIN: CPT

## 2018-02-27 ENCOUNTER — TELEPHONE (OUTPATIENT)
Dept: ADMINISTRATIVE | Facility: CLINIC | Age: 68
End: 2018-02-27

## 2018-02-27 NOTE — PATIENT INSTRUCTIONS
Home Health is notifying doctor:      PATIENT REPORTS PAIN TO SITE OF OPEN WOUND RATED A 6 ON 0-10 SCALE. PAIN RATING HAD PREVIOUSLY BEEN 0. PATIENT ALSO REPORTS HAVING TO CHANGE OUTER DRESSING ON SUNDAY DUE TO INCREASED DRAINAGE. NO ACUTE CHANGES TO OPEN TISSUE OR DRAINAGE NOTED WITH SN VISIT. PATIENT DENIES FEVER, NAUSEA, VOMITING.       Level of urgency: low    Contact info: [phone] or [email or web address]  Whom to call back: clinician

## 2018-02-28 ENCOUNTER — HOSPITAL ENCOUNTER (OUTPATIENT)
Dept: WOUND CARE | Facility: HOSPITAL | Age: 68
Discharge: HOME | End: 2018-02-28
Attending: SURGERY
Payer: MEDICARE

## 2018-02-28 VITALS
SYSTOLIC BLOOD PRESSURE: 171 MMHG | HEIGHT: 62 IN | BODY MASS INDEX: 25.58 KG/M2 | TEMPERATURE: 98 F | DIASTOLIC BLOOD PRESSURE: 85 MMHG | WEIGHT: 139 LBS | HEART RATE: 97 BPM

## 2018-02-28 DIAGNOSIS — T81.89XA SUTURE GRANULOMA, INITIAL ENCOUNTER: ICD-10-CM

## 2018-02-28 DIAGNOSIS — Z93.3 S/P COLOSTOMY: ICD-10-CM

## 2018-02-28 DIAGNOSIS — K63.2 FISTULA OF INTESTINE, EXCLUDING RECTUM AND ANUS: ICD-10-CM

## 2018-02-28 DIAGNOSIS — Z93.3 STATUS POST HARTMANN'S PROCEDURE: ICD-10-CM

## 2018-02-28 DIAGNOSIS — I10 ESSENTIAL HYPERTENSION: ICD-10-CM

## 2018-02-28 DIAGNOSIS — Z98.890 S/P EXPLORATORY LAPAROTOMY: ICD-10-CM

## 2018-02-28 DIAGNOSIS — E11.9 TYPE 2 DIABETES MELLITUS WITHOUT COMPLICATION, UNSPECIFIED LONG TERM INSULIN USE STATUS: Primary | ICD-10-CM

## 2018-02-28 DIAGNOSIS — T14.8XXA DRAINAGE FROM WOUND: ICD-10-CM

## 2018-02-28 PROCEDURE — 87077 CULTURE AEROBIC IDENTIFY: CPT | Mod: 59

## 2018-02-28 PROCEDURE — 99212 OFFICE O/P EST SF 10 MIN: CPT

## 2018-02-28 PROCEDURE — 87075 CULTR BACTERIA EXCEPT BLOOD: CPT

## 2018-02-28 PROCEDURE — 99213 OFFICE O/P EST LOW 20 MIN: CPT

## 2018-02-28 PROCEDURE — 87186 SC STD MICRODIL/AGAR DIL: CPT

## 2018-02-28 PROCEDURE — 87070 CULTURE OTHR SPECIMN AEROBIC: CPT

## 2018-03-03 LAB
BACTERIA SPEC AEROBE CULT: NORMAL
BACTERIA SPEC AEROBE CULT: NORMAL

## 2018-03-06 LAB — BACTERIA SPEC ANAEROBE CULT: NORMAL

## 2018-03-07 ENCOUNTER — HOSPITAL ENCOUNTER (OUTPATIENT)
Dept: WOUND CARE | Facility: HOSPITAL | Age: 68
Discharge: HOME | End: 2018-03-07
Attending: SURGERY
Payer: MEDICARE

## 2018-03-07 VITALS
SYSTOLIC BLOOD PRESSURE: 172 MMHG | TEMPERATURE: 98 F | HEART RATE: 95 BPM | DIASTOLIC BLOOD PRESSURE: 100 MMHG | HEIGHT: 62 IN | WEIGHT: 139 LBS | BODY MASS INDEX: 25.58 KG/M2

## 2018-03-07 DIAGNOSIS — K63.2 FISTULA OF INTESTINE, EXCLUDING RECTUM AND ANUS: ICD-10-CM

## 2018-03-07 DIAGNOSIS — E11.9 TYPE 2 DIABETES MELLITUS WITHOUT COMPLICATION, UNSPECIFIED LONG TERM INSULIN USE STATUS: Primary | ICD-10-CM

## 2018-03-07 PROCEDURE — 99213 OFFICE O/P EST LOW 20 MIN: CPT

## 2018-03-14 ENCOUNTER — HOSPITAL ENCOUNTER (OUTPATIENT)
Dept: WOUND CARE | Facility: HOSPITAL | Age: 68
Discharge: HOME | End: 2018-03-14
Attending: SURGERY
Payer: MEDICARE

## 2018-03-14 VITALS
BODY MASS INDEX: 25.58 KG/M2 | WEIGHT: 139 LBS | DIASTOLIC BLOOD PRESSURE: 85 MMHG | HEART RATE: 94 BPM | TEMPERATURE: 98 F | SYSTOLIC BLOOD PRESSURE: 166 MMHG | HEIGHT: 62 IN

## 2018-03-14 PROCEDURE — 99213 OFFICE O/P EST LOW 20 MIN: CPT

## 2018-03-14 RX ORDER — SULFAMETHOXAZOLE AND TRIMETHOPRIM 400; 80 MG/1; MG/1
1 TABLET ORAL 2 TIMES DAILY
Qty: 60 TABLET | Refills: 1 | Status: SHIPPED | OUTPATIENT
Start: 2018-03-14 | End: 2018-04-11 | Stop reason: SDUPTHER

## 2018-03-14 RX ORDER — SULFAMETHOXAZOLE AND TRIMETHOPRIM 400; 80 MG/1; MG/1
1 TABLET ORAL 2 TIMES DAILY
Qty: 60 TABLET | Refills: 1 | Status: SHIPPED | OUTPATIENT
Start: 2018-03-14 | End: 2018-03-14 | Stop reason: SDUPTHER

## 2018-03-14 RX ORDER — PROMETHAZINE HYDROCHLORIDE 6.25 MG/5ML
SYRUP ORAL
COMMUNITY
Start: 2018-03-01 | End: 2019-01-09

## 2018-03-14 RX ORDER — ONDANSETRON HYDROCHLORIDE 8 MG/1
TABLET, FILM COATED ORAL
COMMUNITY
Start: 2018-02-14 | End: 2019-01-09

## 2018-03-14 RX ORDER — LOSARTAN POTASSIUM 100 MG/1
100 TABLET ORAL DAILY
COMMUNITY
Start: 2018-03-01 | End: 2019-01-09

## 2018-03-14 RX ORDER — AMOXICILLIN AND CLAVULANATE POTASSIUM 875; 125 MG/1; MG/1
1 TABLET, FILM COATED ORAL 2 TIMES DAILY
Qty: 60 TABLET | Refills: 1 | Status: SHIPPED | OUTPATIENT
Start: 2018-03-14 | End: 2018-04-11 | Stop reason: SDUPTHER

## 2018-03-14 RX ORDER — HYDROCHLOROTHIAZIDE 25 MG/1
TABLET ORAL
Status: ON HOLD | COMMUNITY
Start: 2018-03-01 | End: 2019-01-25 | Stop reason: CLARIF

## 2018-03-14 RX ORDER — METFORMIN HYDROCHLORIDE 1000 MG/1
TABLET ORAL
COMMUNITY
Start: 2018-03-01 | End: 2019-01-09

## 2018-03-14 RX ORDER — AMOXICILLIN AND CLAVULANATE POTASSIUM 875; 125 MG/1; MG/1
1 TABLET, FILM COATED ORAL 2 TIMES DAILY
Qty: 60 TABLET | Refills: 1 | Status: SHIPPED | OUTPATIENT
Start: 2018-03-14 | End: 2018-03-14 | Stop reason: SDUPTHER

## 2018-03-14 RX ORDER — INSULIN GLARGINE 100 [IU]/ML
INJECTION, SOLUTION SUBCUTANEOUS
COMMUNITY
Start: 2018-03-01 | End: 2019-01-09

## 2018-03-21 ENCOUNTER — HOSPITAL ENCOUNTER (OUTPATIENT)
Dept: WOUND CARE | Facility: HOSPITAL | Age: 68
Discharge: HOME | End: 2018-03-21
Attending: SURGERY
Payer: MEDICARE

## 2018-03-21 VITALS
BODY MASS INDEX: 25.58 KG/M2 | HEIGHT: 62 IN | TEMPERATURE: 98 F | DIASTOLIC BLOOD PRESSURE: 80 MMHG | SYSTOLIC BLOOD PRESSURE: 169 MMHG | HEART RATE: 91 BPM | WEIGHT: 139 LBS

## 2018-03-21 DIAGNOSIS — I10 ESSENTIAL HYPERTENSION: ICD-10-CM

## 2018-03-21 DIAGNOSIS — E11.9 TYPE 2 DIABETES MELLITUS WITHOUT COMPLICATION, UNSPECIFIED LONG TERM INSULIN USE STATUS: Primary | ICD-10-CM

## 2018-03-21 DIAGNOSIS — R10.84 GENERALIZED ABDOMINAL PAIN: ICD-10-CM

## 2018-03-21 DIAGNOSIS — Z93.3 STATUS POST HARTMANN'S PROCEDURE: ICD-10-CM

## 2018-03-21 DIAGNOSIS — R10.32 LLQ PAIN: ICD-10-CM

## 2018-03-21 DIAGNOSIS — E11.9 TYPE 2 DIABETES MELLITUS WITHOUT COMPLICATION: ICD-10-CM

## 2018-03-21 DIAGNOSIS — K63.2 FISTULA OF INTESTINE, EXCLUDING RECTUM AND ANUS: ICD-10-CM

## 2018-03-21 PROCEDURE — 99214 OFFICE O/P EST MOD 30 MIN: CPT

## 2018-03-21 RX ORDER — TRAMADOL HYDROCHLORIDE 50 MG/1
50 TABLET ORAL EVERY 6 HOURS PRN
Qty: 20 TABLET | Refills: 0 | Status: SHIPPED | OUTPATIENT
Start: 2018-03-21 | End: 2018-03-31

## 2018-03-27 ENCOUNTER — HOSPITAL ENCOUNTER (OUTPATIENT)
Dept: RADIOLOGY | Facility: HOSPITAL | Age: 68
Discharge: HOME | End: 2018-03-27
Attending: SURGERY
Payer: MEDICARE

## 2018-03-27 DIAGNOSIS — E11.9 TYPE 2 DIABETES MELLITUS WITHOUT COMPLICATION, UNSPECIFIED LONG TERM INSULIN USE STATUS: ICD-10-CM

## 2018-03-27 DIAGNOSIS — R10.32 LLQ PAIN: ICD-10-CM

## 2018-03-27 DIAGNOSIS — R10.84 GENERALIZED ABDOMINAL PAIN: ICD-10-CM

## 2018-03-27 DIAGNOSIS — Z93.3 STATUS POST HARTMANN'S PROCEDURE: ICD-10-CM

## 2018-03-27 PROCEDURE — 74018 RADEX ABDOMEN 1 VIEW: CPT | Mod: TC,FY

## 2018-03-27 PROCEDURE — 74018 RADEX ABDOMEN 1 VIEW: CPT | Mod: 26,,, | Performed by: RADIOLOGY

## 2018-03-28 ENCOUNTER — HOSPITAL ENCOUNTER (OUTPATIENT)
Dept: RADIOLOGY | Facility: HOSPITAL | Age: 68
Discharge: HOME | End: 2018-03-28
Attending: SURGERY
Payer: MEDICARE

## 2018-03-28 ENCOUNTER — HOSPITAL ENCOUNTER (OUTPATIENT)
Dept: WOUND CARE | Facility: HOSPITAL | Age: 68
Discharge: HOME | End: 2018-03-28
Attending: SURGERY
Payer: MEDICARE

## 2018-03-28 VITALS
BODY MASS INDEX: 25.58 KG/M2 | WEIGHT: 139 LBS | TEMPERATURE: 98 F | DIASTOLIC BLOOD PRESSURE: 83 MMHG | HEART RATE: 96 BPM | SYSTOLIC BLOOD PRESSURE: 181 MMHG | HEIGHT: 62 IN

## 2018-03-28 DIAGNOSIS — Z93.3 S/P COLOSTOMY: ICD-10-CM

## 2018-03-28 DIAGNOSIS — I10 ESSENTIAL HYPERTENSION: ICD-10-CM

## 2018-03-28 DIAGNOSIS — Z90.49 HISTORY OF HEMICOLECTOMY: ICD-10-CM

## 2018-03-28 DIAGNOSIS — R10.32 LLQ PAIN: ICD-10-CM

## 2018-03-28 DIAGNOSIS — T14.8XXA DRAINAGE FROM WOUND: ICD-10-CM

## 2018-03-28 DIAGNOSIS — E11.622 TYPE 2 DIABETES MELLITUS WITH OTHER SKIN ULCER, UNSPECIFIED LONG TERM INSULIN USE STATUS: ICD-10-CM

## 2018-03-28 DIAGNOSIS — Z93.3 STATUS POST HARTMANN'S PROCEDURE: ICD-10-CM

## 2018-03-28 DIAGNOSIS — R10.84 GENERALIZED ABDOMINAL PAIN: ICD-10-CM

## 2018-03-28 DIAGNOSIS — L98.499 TYPE 2 DIABETES MELLITUS WITH OTHER SKIN ULCER, UNSPECIFIED LONG TERM INSULIN USE STATUS: ICD-10-CM

## 2018-03-28 DIAGNOSIS — Z98.890 S/P EXPLORATORY LAPAROTOMY: ICD-10-CM

## 2018-03-28 DIAGNOSIS — E11.9 TYPE 2 DIABETES MELLITUS WITHOUT COMPLICATION, UNSPECIFIED LONG TERM INSULIN USE STATUS: ICD-10-CM

## 2018-03-28 DIAGNOSIS — K63.2 ENTERO-ENTERIC FISTULA: ICD-10-CM

## 2018-03-28 DIAGNOSIS — T81.89XA SUTURE GRANULOMA, INITIAL ENCOUNTER: ICD-10-CM

## 2018-03-28 PROCEDURE — 74178 CT ABD&PLV WO CNTR FLWD CNTR: CPT | Mod: 26,,, | Performed by: RADIOLOGY

## 2018-03-28 PROCEDURE — 74178 CT ABD&PLV WO CNTR FLWD CNTR: CPT | Mod: TC

## 2018-03-28 PROCEDURE — 25500020 PHARM REV CODE 255

## 2018-03-28 PROCEDURE — 99213 OFFICE O/P EST LOW 20 MIN: CPT | Mod: 25

## 2018-03-28 PROCEDURE — 25500020 PHARM REV CODE 255: Performed by: SURGERY

## 2018-03-28 RX ADMIN — IOHEXOL 30 ML: 350 INJECTION, SOLUTION INTRAVENOUS at 09:03

## 2018-03-28 RX ADMIN — IOHEXOL 75 ML: 350 INJECTION, SOLUTION INTRAVENOUS at 11:03

## 2018-03-28 RX ADMIN — IOHEXOL 30 ML: 350 INJECTION, SOLUTION INTRAVENOUS at 11:03

## 2018-04-04 ENCOUNTER — HOSPITAL ENCOUNTER (OUTPATIENT)
Dept: WOUND CARE | Facility: HOSPITAL | Age: 68
Discharge: HOME | End: 2018-04-04
Attending: SURGERY
Payer: MEDICARE

## 2018-04-04 VITALS — TEMPERATURE: 99 F | HEART RATE: 77 BPM | SYSTOLIC BLOOD PRESSURE: 161 MMHG | DIASTOLIC BLOOD PRESSURE: 80 MMHG

## 2018-04-04 DIAGNOSIS — I10 ESSENTIAL HYPERTENSION: ICD-10-CM

## 2018-04-04 DIAGNOSIS — R10.84 GENERALIZED ABDOMINAL PAIN: ICD-10-CM

## 2018-04-04 DIAGNOSIS — K63.2 FISTULA OF INTESTINE, EXCLUDING RECTUM AND ANUS: ICD-10-CM

## 2018-04-04 DIAGNOSIS — E11.9 TYPE 2 DIABETES MELLITUS WITHOUT COMPLICATION, UNSPECIFIED LONG TERM INSULIN USE STATUS: Primary | ICD-10-CM

## 2018-04-04 PROCEDURE — 99213 OFFICE O/P EST LOW 20 MIN: CPT

## 2018-04-11 ENCOUNTER — HOSPITAL ENCOUNTER (OUTPATIENT)
Dept: WOUND CARE | Facility: HOSPITAL | Age: 68
Discharge: HOME | End: 2018-04-11
Attending: SURGERY
Payer: MEDICARE

## 2018-04-11 VITALS — HEART RATE: 81 BPM | TEMPERATURE: 98 F | SYSTOLIC BLOOD PRESSURE: 173 MMHG | DIASTOLIC BLOOD PRESSURE: 97 MMHG

## 2018-04-11 PROCEDURE — 99214 OFFICE O/P EST MOD 30 MIN: CPT

## 2018-04-11 RX ORDER — AMOXICILLIN AND CLAVULANATE POTASSIUM 875; 125 MG/1; MG/1
1 TABLET, FILM COATED ORAL 2 TIMES DAILY
Qty: 60 TABLET | Refills: 1 | Status: SHIPPED | OUTPATIENT
Start: 2018-04-11 | End: 2018-05-30 | Stop reason: SDUPTHER

## 2018-04-11 RX ORDER — SULFAMETHOXAZOLE AND TRIMETHOPRIM 400; 80 MG/1; MG/1
1 TABLET ORAL 2 TIMES DAILY
Qty: 60 TABLET | Refills: 1 | Status: SHIPPED | OUTPATIENT
Start: 2018-04-11 | End: 2018-05-30 | Stop reason: SDUPTHER

## 2018-04-18 ENCOUNTER — HOSPITAL ENCOUNTER (OUTPATIENT)
Dept: WOUND CARE | Facility: HOSPITAL | Age: 68
Discharge: HOME | End: 2018-04-18
Attending: SURGERY
Payer: MEDICARE

## 2018-04-18 VITALS
HEART RATE: 92 BPM | DIASTOLIC BLOOD PRESSURE: 88 MMHG | WEIGHT: 139 LBS | SYSTOLIC BLOOD PRESSURE: 162 MMHG | HEIGHT: 62 IN | TEMPERATURE: 98 F | BODY MASS INDEX: 25.58 KG/M2

## 2018-04-18 DIAGNOSIS — T14.8XXA DRAINAGE FROM WOUND: ICD-10-CM

## 2018-04-18 DIAGNOSIS — E11.628 DIABETES WITH SKIN COMPLICATION: Primary | ICD-10-CM

## 2018-04-18 PROCEDURE — 87077 CULTURE AEROBIC IDENTIFY: CPT

## 2018-04-18 PROCEDURE — 87070 CULTURE OTHR SPECIMN AEROBIC: CPT

## 2018-04-18 PROCEDURE — 99213 OFFICE O/P EST LOW 20 MIN: CPT

## 2018-04-18 PROCEDURE — 87186 SC STD MICRODIL/AGAR DIL: CPT

## 2018-04-18 PROCEDURE — 87075 CULTR BACTERIA EXCEPT BLOOD: CPT

## 2018-04-21 LAB — BACTERIA SPEC AEROBE CULT: NORMAL

## 2018-04-23 LAB — BACTERIA SPEC ANAEROBE CULT: NORMAL

## 2018-04-25 ENCOUNTER — HOSPITAL ENCOUNTER (OUTPATIENT)
Dept: WOUND CARE | Facility: HOSPITAL | Age: 68
Discharge: HOME | End: 2018-04-25
Attending: SURGERY
Payer: MEDICARE

## 2018-04-25 VITALS
HEIGHT: 62 IN | HEART RATE: 90 BPM | SYSTOLIC BLOOD PRESSURE: 154 MMHG | WEIGHT: 139 LBS | TEMPERATURE: 98 F | BODY MASS INDEX: 25.58 KG/M2 | DIASTOLIC BLOOD PRESSURE: 84 MMHG

## 2018-04-25 DIAGNOSIS — E11.628 DIABETES WITH SKIN COMPLICATION: ICD-10-CM

## 2018-04-25 DIAGNOSIS — K63.2 FISTULA OF INTESTINE, EXCLUDING RECTUM AND ANUS: ICD-10-CM

## 2018-04-25 DIAGNOSIS — R10.32 LLQ PAIN: Primary | ICD-10-CM

## 2018-04-25 DIAGNOSIS — Z93.3 STATUS POST HARTMANN'S PROCEDURE: ICD-10-CM

## 2018-04-25 DIAGNOSIS — I10 ESSENTIAL HYPERTENSION: ICD-10-CM

## 2018-04-25 DIAGNOSIS — Z90.49 HISTORY OF HEMICOLECTOMY: ICD-10-CM

## 2018-04-25 DIAGNOSIS — T14.8XXA DRAINAGE FROM WOUND: ICD-10-CM

## 2018-04-25 PROCEDURE — 99214 OFFICE O/P EST MOD 30 MIN: CPT

## 2018-05-02 ENCOUNTER — HOSPITAL ENCOUNTER (OUTPATIENT)
Dept: RADIOLOGY | Facility: HOSPITAL | Age: 68
Discharge: HOME | End: 2018-05-02
Attending: SURGERY
Payer: MEDICARE

## 2018-05-02 ENCOUNTER — HOSPITAL ENCOUNTER (OUTPATIENT)
Dept: WOUND CARE | Facility: HOSPITAL | Age: 68
Discharge: HOME | End: 2018-05-02
Attending: SURGERY
Payer: MEDICARE

## 2018-05-02 VITALS
SYSTOLIC BLOOD PRESSURE: 137 MMHG | BODY MASS INDEX: 25.58 KG/M2 | HEIGHT: 62 IN | HEART RATE: 89 BPM | WEIGHT: 139 LBS | TEMPERATURE: 98 F | DIASTOLIC BLOOD PRESSURE: 83 MMHG

## 2018-05-02 DIAGNOSIS — E11.628 DIABETES WITH SKIN COMPLICATION: ICD-10-CM

## 2018-05-02 DIAGNOSIS — Z93.3 STATUS POST HARTMANN'S PROCEDURE: ICD-10-CM

## 2018-05-02 DIAGNOSIS — R10.32 LLQ PAIN: ICD-10-CM

## 2018-05-02 DIAGNOSIS — I10 ESSENTIAL HYPERTENSION: ICD-10-CM

## 2018-05-02 DIAGNOSIS — Z90.49 HISTORY OF HEMICOLECTOMY: ICD-10-CM

## 2018-05-02 DIAGNOSIS — T14.8XXA DRAINAGE FROM WOUND: ICD-10-CM

## 2018-05-02 DIAGNOSIS — K63.2 FISTULA OF INTESTINE, EXCLUDING RECTUM AND ANUS: ICD-10-CM

## 2018-05-02 PROCEDURE — 76705 ECHO EXAM OF ABDOMEN: CPT | Mod: 26,LT,, | Performed by: RADIOLOGY

## 2018-05-02 PROCEDURE — 99214 OFFICE O/P EST MOD 30 MIN: CPT | Mod: 25

## 2018-05-02 PROCEDURE — 76999 ECHO EXAMINATION PROCEDURE: CPT | Mod: TC

## 2018-05-09 ENCOUNTER — HOSPITAL ENCOUNTER (OUTPATIENT)
Dept: WOUND CARE | Facility: HOSPITAL | Age: 68
Discharge: HOME | End: 2018-05-09
Attending: SURGERY
Payer: MEDICARE

## 2018-05-09 VITALS
SYSTOLIC BLOOD PRESSURE: 157 MMHG | TEMPERATURE: 98 F | HEART RATE: 95 BPM | WEIGHT: 139 LBS | DIASTOLIC BLOOD PRESSURE: 93 MMHG | HEIGHT: 62 IN | BODY MASS INDEX: 25.58 KG/M2

## 2018-05-09 PROCEDURE — 99214 OFFICE O/P EST MOD 30 MIN: CPT

## 2018-05-09 RX ORDER — HYDROCODONE BITARTRATE AND ACETAMINOPHEN 5; 325 MG/1; MG/1
1 TABLET ORAL EVERY 6 HOURS PRN
Qty: 24 TABLET | Refills: 0 | Status: SHIPPED | OUTPATIENT
Start: 2018-05-09 | End: 2018-05-19

## 2018-05-16 ENCOUNTER — TELEPHONE (OUTPATIENT)
Dept: ADMINISTRATIVE | Facility: CLINIC | Age: 68
End: 2018-05-16

## 2018-05-16 ENCOUNTER — HOSPITAL ENCOUNTER (OUTPATIENT)
Dept: WOUND CARE | Facility: HOSPITAL | Age: 68
Discharge: HOME | End: 2018-05-16
Attending: SURGERY
Payer: MEDICARE

## 2018-05-16 VITALS
DIASTOLIC BLOOD PRESSURE: 87 MMHG | TEMPERATURE: 99 F | HEIGHT: 62 IN | BODY MASS INDEX: 25.58 KG/M2 | HEART RATE: 86 BPM | WEIGHT: 139 LBS | SYSTOLIC BLOOD PRESSURE: 157 MMHG

## 2018-05-16 DIAGNOSIS — I10 ESSENTIAL HYPERTENSION: ICD-10-CM

## 2018-05-16 DIAGNOSIS — E11.9 TYPE 2 DIABETES MELLITUS WITHOUT COMPLICATION, UNSPECIFIED WHETHER LONG TERM INSULIN USE: ICD-10-CM

## 2018-05-16 DIAGNOSIS — R10.84 GENERALIZED ABDOMINAL PAIN: Primary | ICD-10-CM

## 2018-05-16 DIAGNOSIS — E11.628 DIABETES WITH SKIN COMPLICATION: ICD-10-CM

## 2018-05-16 DIAGNOSIS — Z79.4 TYPE 2 DIABETES MELLITUS WITH OTHER SKIN ULCER, WITH LONG-TERM CURRENT USE OF INSULIN: ICD-10-CM

## 2018-05-16 DIAGNOSIS — R10.32 LLQ PAIN: ICD-10-CM

## 2018-05-16 DIAGNOSIS — E11.622 TYPE 2 DIABETES MELLITUS WITH OTHER SKIN ULCER, WITH LONG-TERM CURRENT USE OF INSULIN: ICD-10-CM

## 2018-05-16 DIAGNOSIS — T81.89XA SUTURE GRANULOMA, INITIAL ENCOUNTER: ICD-10-CM

## 2018-05-16 PROCEDURE — 99213 OFFICE O/P EST LOW 20 MIN: CPT

## 2018-05-16 NOTE — PATIENT INSTRUCTIONS
Continue to change surface dressing on wound as needed per patient. Dr. Horton ordered colonscopy lower bowel.

## 2018-05-16 NOTE — TELEPHONE ENCOUNTER
Home Health Recert 04/26/2018 to 06/24/2018  [facility] [address] : SN

## 2018-05-21 ENCOUNTER — TELEPHONE (OUTPATIENT)
Dept: NEUROLOGY | Facility: HOSPITAL | Age: 68
End: 2018-05-21

## 2018-05-21 NOTE — TELEPHONE ENCOUNTER
----- Message from Aurelio Back MD sent at 5/21/2018  8:01 AM CDT -----  Ahsan King,  I think Dr. 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 He placed a consult to GI, but this patient is an outpatient and was seen in the wound care clinic.     Thanks,  Norma

## 2018-05-21 NOTE — TELEPHONE ENCOUNTER
Azucena, daughter in law, pt will need a  for appt on Wednesday, May 30, 2018.  Azucena notified  will be available.

## 2018-05-21 NOTE — TELEPHONE ENCOUNTER
Andry, son, will have wife, Azucena, contact clinic to schedule clinic appt.  Azucena's number is [phone], can only be reached after 12pm.

## 2018-05-30 ENCOUNTER — TELEPHONE (OUTPATIENT)
Dept: NEUROLOGY | Facility: HOSPITAL | Age: 68
End: 2018-05-30

## 2018-05-30 ENCOUNTER — HOSPITAL ENCOUNTER (OUTPATIENT)
Dept: WOUND CARE | Facility: HOSPITAL | Age: 68
Discharge: HOME | End: 2018-05-30
Attending: SURGERY
Payer: MEDICARE

## 2018-05-30 ENCOUNTER — OFFICE VISIT (OUTPATIENT)
Dept: NEUROLOGY | Facility: HOSPITAL | Age: 68
End: 2018-05-30
Attending: INTERNAL MEDICINE
Payer: MEDICARE

## 2018-05-30 VITALS
WEIGHT: 170 LBS | HEIGHT: 63 IN | BODY MASS INDEX: 30.12 KG/M2 | SYSTOLIC BLOOD PRESSURE: 162 MMHG | WEIGHT: 139 LBS | DIASTOLIC BLOOD PRESSURE: 77 MMHG | TEMPERATURE: 98 F | HEART RATE: 86 BPM | HEART RATE: 95 BPM | HEIGHT: 62 IN | BODY MASS INDEX: 25.58 KG/M2 | DIASTOLIC BLOOD PRESSURE: 94 MMHG | SYSTOLIC BLOOD PRESSURE: 113 MMHG | TEMPERATURE: 98 F

## 2018-05-30 DIAGNOSIS — E11.9 TYPE 2 DIABETES MELLITUS WITHOUT COMPLICATION, UNSPECIFIED WHETHER LONG TERM INSULIN USE: Primary | ICD-10-CM

## 2018-05-30 DIAGNOSIS — Z90.49 HISTORY OF HEMICOLECTOMY: ICD-10-CM

## 2018-05-30 DIAGNOSIS — K63.2 COLONIC FISTULA: Primary | ICD-10-CM

## 2018-05-30 DIAGNOSIS — K63.2 FISTULA OF INTESTINE, EXCLUDING RECTUM AND ANUS: ICD-10-CM

## 2018-05-30 PROCEDURE — 99214 OFFICE O/P EST MOD 30 MIN: CPT | Mod: 27 | Performed by: INTERNAL MEDICINE

## 2018-05-30 PROCEDURE — 99213 OFFICE O/P EST LOW 20 MIN: CPT

## 2018-05-30 RX ORDER — POLYETHYLENE GLYCOL 3350, SODIUM SULFATE ANHYDROUS, SODIUM BICARBONATE, SODIUM CHLORIDE, POTASSIUM CHLORIDE 236; 22.74; 6.74; 5.86; 2.97 G/4L; G/4L; G/4L; G/4L; G/4L
4 POWDER, FOR SOLUTION ORAL ONCE
Qty: 4000 ML | Refills: 0 | Status: SHIPPED | OUTPATIENT
Start: 2018-05-30 | End: 2018-05-31

## 2018-05-30 RX ORDER — SULFAMETHOXAZOLE AND TRIMETHOPRIM 400; 80 MG/1; MG/1
1 TABLET ORAL 2 TIMES DAILY
Qty: 60 TABLET | Refills: 1 | Status: SHIPPED | OUTPATIENT
Start: 2018-05-30 | End: 2018-05-30

## 2018-05-30 RX ORDER — AMOXICILLIN AND CLAVULANATE POTASSIUM 875; 125 MG/1; MG/1
1 TABLET, FILM COATED ORAL 2 TIMES DAILY
Qty: 60 TABLET | Refills: 1 | Status: SHIPPED | OUTPATIENT
Start: 2018-05-30 | End: 2018-07-11 | Stop reason: SDUPTHER

## 2018-05-30 NOTE — PATIENT INSTRUCTIONS
Nulytely Colonoscopy Prep Instructions    [facility] - [address], [place] [address]  [phone]      PATIENT NAME:  Azucena Morrison              PROCEDURE DAY: Monday, June 4, 2018    *Your procedure time many change.  The hospital will contact you the day prior to   the procedure to confirm your procedure time and arrival.       CLEAR LIQUID DIET (START THE DAY BEFORE PROCEDURE): Sunil, Saniya 3, 2018      Clear Liquid Diet means any liquid from the list below that is not red or purple in color:   Gatorade, Chago-Aid, Lemonade (Yellow ONLY)-Gatorade is the preferred liquid   Tea (no milk or dairy)   Carbonated beverages (soft drink), regular or diet   Apple juice, white grape juice, white cranberry juice   Jell-O (orange, lemon, or lime flavors ONLY)   Clear, fat-free, beef or chicken broth   Bouillon, clear consommé   Snowball, Popsicles (NOT red or purple)  * No Solid Food or Alcohol     ITEMS TO BE PURCHASED FOR PREP (Nu-Lytely requires a prescription):         Nu-Lytely preparation solution.          Gas tablets (Gas-X, Mylanta Gas, Simethicone, Dulcolax)    BOWEL PREP INSTRUCTIONS THE DAY BEFORE THE EXAM: Sunil, Saniya 3, 2018  1. Drink only clear liquids (see the above diet) all day. Gatorade is the best liquid.   Drink an extra 8 ounces of clear liquid every hour from 11am to 5pm.         2.   At 6pm, mix Nu-Lytely powder according to the directions on the container and               drink 8 ounces of solution every 10 minutes until about half of the solution is                consumed. Place the remainder of the solution in the refrigerator.         3.   At 9pm, take two gas tablets with 8 ounces of clear liquid.        4.   At 10pm, take two gas tablets with 8 ounces of clear liquid.      THE DAY OF THE EXAM: Monday, June 4, 2018        1.  Beginning 5 hours before your procedure time, drink the remaining half of              Nu-Lytely solution. Drink 8 ounces of  solution every 10 minutes until the solution              is gone.              *If your procedure is scheduled for the early morning, you will need to get up in               the middle of the night to take this dose of preparation. The correct timing of this               dose is essential to an effective preparation. If you do not take this dose, your               exam may be incomplete and need to be repeated.         2.  Have nothing to eat or drink for 3 hours before the procedure.         3.  Take your morning medications, if any, with a small sip of water.         4.  Bring someone to drive you home (you should not drive for 12 hrs after the exam)        5.  Report to Admitting, 1st floor hospital entrance 2 hours before procedure time.       If you are diabetic, do not take insulin or oral medications the morning of the procedure. Take only a half dose of insulin the day before your procedure. Do not take your diabetic pills the day of your procedure               Colonoscopy is used to view the inside of your lower digestive tract (colon and rectum). It can help screen for colon cancer and can also help find the source of abdominal pain, bleeding, and changes in bowel habits. The test is usually done in the hospital on an outpatient basis. During the exam, the doctor can remove a small tissue sample ( a biopsy) for testing. Small growths, such as polyps, may also be removed during colonoscopy.     A camera attached to a flexible tube with a viewing lens is used to take video pictures.    Getting Ready    Be sure to tell your doctor about any medications you take. Also tell your doctor about any health conditions you may have.   Discuss the risks of the test with your doctor. These include bleeding and bowel puncture.   Your rectum and colon must be empty for the test. So be sure to follow the diet and bowel prep instructions exactly. If you dont, the test may need to be rescheduled.   You will need  to have a friend or family member prepared to drive you home after the test.     Colonoscopy provides an inside view of the entire colon.    During the Test    You are given sedating (relaxing) medication through an IV line. You may be drowsy or completely asleep.   The procedure takes 30 minutes or longer.   The doctor performs a digital rectal exam to check for anal and rectal problems. The rectum is lubricated and the scope inserted.   If you are awake, you may have a feeling similar to needing to have a bowel movement. You may also feel pressure as air is pumped into the colon. Its okay to pass gas during the procedure.  After the Test    You may discuss the results with your doctor right away or at a future visit.   Try to pass all the gas right after the test to help prevent bloating and cramping.   After the test, you can go back to your normal eating and other activities.  Risks and Possible Complications Include:   Bleeding  A puncture or tear in the colon  Risks of anesthesia

## 2018-05-30 NOTE — PROGRESS NOTES
[facility]    CC: fistula    HPI 67 y.o. 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 She originally had either an impaction with perforation or infection at the time requiring emergent surgery. Patient has been seen frequently by Dr. Horton in clinic for wound care after her ostomy takedown and referred here for evaluation. He last colonoscopy was in  prior to her surgery. She does not remember what was found at that time. She does not have a family history of IBD, colon or gastric cancer. She has two openings in her lower abdomen that have been evaluated and are colo-cutaneous fistulas. They have had difficulty closing, despite aggressive wound care.      PMH  DMII  HLD  HTN  Kidney stone    PSH    CCY  Colon surgery in [facility]  Colostomy  Incisional hernia repair    Social History  No smoking, no alcohol, no illicit drug use    FH  No family history of IBD, colon or gastric cancer    Review of Systems  General ROS: negative for chills, fever or weight loss, no fatigue  Psychological ROS: negative for hallucination, depression or suicidal ideation  Ophthalmic ROS: negative for blurry vision, photophobia or eye pain  ENT ROS: no oral ulcers, negative for epistaxis, sore throat or rhinorrhea  Respiratory ROS: no cough, shortness of breath, or wheezing  Cardiovascular ROS: no chest pain or dyspnea on exertion  Gastrointestinal ROS: +abdominal drainage, +two open wounds, no abdominal pain, no diarrhea or change in bowel habits, no black/ bloody stools  Genito-Urinary ROS: no dysuria, trouble voiding, or hematuria  Musculoskeletal ROS: no arthritis or joint pains, negative for gait disturbance or muscular weakness  Neurological ROS: no syncope or seizures; no ataxia  Dermatological ROS: negative for  "pruritis, rash and jaundice    Physical Examination  BP (!) 162/94   Pulse 95   Temp 97.7 °F (36.5 °C) (Oral)   Ht 5' 3" (1.6 m)   Wt 77.1 kg (169 lb 15.6 oz)   BMI 30.11 kg/m²   General appearance: alert, cooperative, no distress  HENT: Normocephalic, atraumatic, neck symmetrical, no nasal discharge   Eyes: conjunctivae/corneas clear, PERRL, EOM's intact  Lungs: clear to auscultation bilaterally, no dullness to percussion bilaterally  Heart: regular rate and rhythm without rub; no displacement of the PMI   Abdomen: midline well healed incision, bandage over inferior portion with packing in place, no purulent drainage noted, multiple abdominal scars, soft, non-tender; bowel sounds normoactive; no organomegaly  Extremities: extremities symmetric; no clubbing, cyanosis, or edema  Integument: Skin color, texture, turgor normal; no rashes; hair distrubution normal  Neurologic: Alert and oriented X 3, normal strength, normal coordination and gait  Psychiatric: no pressured speech; normal affect; no evidence of impaired cognition     Labs:  H/H 11/34  Plt 375    Imaging:    CT A/P:   -Postsurgical changes of partial bowel resection.  Arising from the distal colonic anastomosis is a small irregular tubular structure containing contrast, fluid and air extending more anteriorly within the abdomen, could represent a small fistula.  It could communicate with additional adjacent loops of bowel.  It is not significantly changed from the prior study.  No evidence of bowel obstruction.  -Cholecystectomy.  -Coronary artery calcifications.  -Atherosclerotic changes of the aorta.    Previous medical records reviewed       Assessment:   History of complicated surgery requiring partial colectomy and colostomy. She recently had ostomy takedown and now has two colo-cutaneous fistulas that have not closed with extended wound care. She will need endoscopic evaluation to rule out Crohn's disease and at the same time can evaluate " fistula site per below and potentially place endoscopic clips to close defect.    Plan:   -Will plan colonoscopy for evaluation of colo-cutaneous fistula  -Will take packing out of inferior abdominal wounds, use 10 cc slip tip syringe with indigo carmine or phill ink and inject dye in the fistula to evaluate where the defect is so that we may be able to place endoscopic clips to close the defect  -Nulytely prep sent to patient's pharmacy  -Future management could include octreotide injections  -Could consider other options of management including 2-3 months of NPO with TPN as nutritional supplementation to help close colo-cutaneous fistula vs another surgery     Patient of Dr. Horton   Patient is primarily Danish speaking so will likely need an  prior to her exam         Gibran Aguayo MD   [address], [address]   [place] [address] [phone]

## 2018-05-30 NOTE — TELEPHONE ENCOUNTER
----- Message from Christine Phan LPN sent at 5/30/2018 10:09 AM CDT -----  Colonoscopy on June 4, 2018.  CPT 40158, [id number].    Thanks.

## 2018-06-04 ENCOUNTER — SURGERY (OUTPATIENT)
Age: 68
End: 2018-06-04

## 2018-06-04 ENCOUNTER — ANESTHESIA EVENT (OUTPATIENT)
Dept: ENDOSCOPY | Facility: HOSPITAL | Age: 68
End: 2018-06-04
Payer: MEDICARE

## 2018-06-04 ENCOUNTER — HOSPITAL ENCOUNTER (OUTPATIENT)
Facility: HOSPITAL | Age: 68
Discharge: HOME | End: 2018-06-04
Attending: INTERNAL MEDICINE | Admitting: INTERNAL MEDICINE
Payer: MEDICARE

## 2018-06-04 ENCOUNTER — ANESTHESIA (OUTPATIENT)
Dept: ENDOSCOPY | Facility: HOSPITAL | Age: 68
End: 2018-06-04
Payer: MEDICARE

## 2018-06-04 DIAGNOSIS — Z93.3 S/P COLOSTOMY: ICD-10-CM

## 2018-06-04 DIAGNOSIS — K63.5 POLYP OF COLON, UNSPECIFIED PART OF COLON, UNSPECIFIED TYPE: ICD-10-CM

## 2018-06-04 DIAGNOSIS — K63.2 COLONIC FISTULA: Primary | ICD-10-CM

## 2018-06-04 LAB
GLUCOSE SERPL-MCNC: 159 MG/DL (ref 70–110)
POCT GLUCOSE: 159 MG/DL (ref 70–110)

## 2018-06-04 PROCEDURE — 37000009 HC ANESTHESIA EA ADD 15 MINS: Performed by: INTERNAL MEDICINE

## 2018-06-04 PROCEDURE — 37000008 HC ANESTHESIA 1ST 15 MINUTES: Performed by: INTERNAL MEDICINE

## 2018-06-04 PROCEDURE — 82962 GLUCOSE BLOOD TEST: CPT | Performed by: INTERNAL MEDICINE

## 2018-06-04 PROCEDURE — 45380 COLONOSCOPY AND BIOPSY: CPT | Performed by: INTERNAL MEDICINE

## 2018-06-04 PROCEDURE — 27201012 HC FORCEPS, HOT/COLD, DISP: Performed by: INTERNAL MEDICINE

## 2018-06-04 PROCEDURE — 63600175 PHARM REV CODE 636 W HCPCS: Performed by: NURSE ANESTHETIST, CERTIFIED REGISTERED

## 2018-06-04 PROCEDURE — 27202363 HC INJECTION AGENT, SUBMUCOSAL, ANY: Performed by: INTERNAL MEDICINE

## 2018-06-04 PROCEDURE — 45381 COLONOSCOPY SUBMUCOUS NJX: CPT

## 2018-06-04 PROCEDURE — 88305 TISSUE EXAM BY PATHOLOGIST: CPT | Mod: 26,,, | Performed by: PATHOLOGY

## 2018-06-04 PROCEDURE — 25000003 PHARM REV CODE 250: Performed by: NURSE ANESTHETIST, CERTIFIED REGISTERED

## 2018-06-04 PROCEDURE — 88305 TISSUE EXAM BY PATHOLOGIST: CPT | Performed by: PATHOLOGY

## 2018-06-04 RX ORDER — PROPOFOL 10 MG/ML
VIAL (ML) INTRAVENOUS CONTINUOUS PRN
Status: DISCONTINUED | OUTPATIENT
Start: 2018-06-04 | End: 2018-06-04

## 2018-06-04 RX ORDER — SODIUM CHLORIDE 9 MG/ML
INJECTION, SOLUTION INTRAVENOUS CONTINUOUS PRN
Status: DISCONTINUED | OUTPATIENT
Start: 2018-06-04 | End: 2018-06-04

## 2018-06-04 RX ORDER — LIDOCAINE HCL/PF 100 MG/5ML
SYRINGE (ML) INTRAVENOUS
Status: DISCONTINUED | OUTPATIENT
Start: 2018-06-04 | End: 2018-06-04

## 2018-06-04 RX ORDER — PROPOFOL 10 MG/ML
VIAL (ML) INTRAVENOUS
Status: DISCONTINUED | OUTPATIENT
Start: 2018-06-04 | End: 2018-06-04

## 2018-06-04 RX ADMIN — SODIUM CHLORIDE: 0.9 INJECTION, SOLUTION INTRAVENOUS at 10:06

## 2018-06-04 RX ADMIN — PROPOFOL 50 MG: 10 INJECTION, EMULSION INTRAVENOUS at 10:06

## 2018-06-04 RX ADMIN — LIDOCAINE HYDROCHLORIDE 100 MG: 20 INJECTION, SOLUTION INTRAVENOUS at 10:06

## 2018-06-04 RX ADMIN — PROPOFOL 150 MCG/KG/MIN: 10 INJECTION, EMULSION INTRAVENOUS at 10:06

## 2018-06-04 NOTE — H&P
[facility]    CC: fistula    HPI 67 y.o. female with history of DMII, HLD, HTN, kidney stones, partial colon resection in [place] who presents for evaluation of persistent colo-cutaneous fistula associated with frequent drainage, but not associated with history of IBD or diarrhea. She originally had either an impaction with perforation or infection at the time requiring emergent surgery. Patient has been seen frequently by Dr. Horton in clinic for wound care after her ostomy takedown and referred here for evaluation. He last colonoscopy was in  prior to her surgery. She does not remember what was found at that time. She does not have a family history of IBD, colon or gastric cancer. She has two openings in her lower abdomen that have been evaluated and are colo-cutaneous fistulas. They have had difficulty closing, despite aggressive wound care.       PMH  DMII  HLD  HTN  Kidney stone     PSH    CCY  Colon surgery in [facility]  Colostomy  Incisional hernia repair    Review of Systems  General ROS: negative for - chills, fever or weight loss  Cardiovascular ROS: no chest pain or dyspnea on exertion  Gastrointestinal ROS: no abdominal pain, change in bowel habits, or black/ bloody stools    Physical Examination  There were no vitals taken for this visit.  General appearance: alert, cooperative, no distress  HENT: Normocephalic, atraumatic, neck symmetrical, no nasal discharge   Lungs: clear to auscultation in all fields, symmetric chest wall expansion bilaterally, no wheeze, rale, or rhonchi  Heart: normal rate, regular rhythm without rub; palpable peripheral pulsesI   Abdomen: soft, non-tender; bowel sounds normoactive; no organomegaly  Extremities: extremities symmetric; no clubbing, cyanosis, or edema  Neurologic: Alert and oriented X 3, normal strength, normal coordination    Labs:  H/H   Plt 375     Imaging:     CT A/P:   -Postsurgical changes of partial bowel resection.  Arising from  the distal colonic anastomosis is a small irregular tubular structure containing contrast, fluid and air extending more anteriorly within the abdomen, could represent a small fistula.  It could communicate with additional adjacent loops of bowel.  It is not significantly changed from the prior study.  No evidence of bowel obstruction.  -Cholecystectomy.  -Coronary artery calcifications.  -Atherosclerotic changes of the aorta.    Assessment:   67 year old F with history of complicated surgery requiring partial colectomy and colostomy. She recently had ostomy takedown and now has two colo-cutaneous fistulas that have not closed with extended wound care. She will need endoscopic evaluation to rule out Crohn's disease and at the same time can evaluate fistula site per below and potentially place endoscopic clips to close defect.     Plan:   - Colonoscopy today for evaluation of colo-cutaneous fistula  -Will take packing out of inferior abdominal wounds, use 10 cc slip tip syringe with indigo carmine or phill ink and inject dye in the fistula to evaluate where the defect is so that we may be able to place endoscopic clips to close the defec  -Future management could include octreotide injections  -Could consider other options of management including 2-3 months of NPO with TPN as nutritional supplementation to help close colo-cutaneous fistula vs another surgery      Patient of Dr. Horton   Patient is primarily Haitian speaking so will likely need an  prior to her exam      Marjan Nolan MD  [facility] PGY VI  [phone]

## 2018-06-04 NOTE — ANESTHESIA POSTPROCEDURE EVALUATION
"Anesthesia Post Evaluation    Patient: Azucena Morrison    Procedure(s) Performed: Procedure(s) (LRB):  COLONOSCOPY (N/A)    Final Anesthesia Type: MAC  Patient location during evaluation: GI PACU  Patient participation: Yes- Able to Participate  Level of consciousness: awake and alert and oriented  Post-procedure vital signs: reviewed and stable  Pain management: adequate  Airway patency: patent  PONV status at discharge: No PONV  Anesthetic complications: no      Cardiovascular status: blood pressure returned to baseline, hemodynamically stable and stable  Respiratory status: unassisted, spontaneous ventilation and room air  Hydration status: euvolemic  Follow-up not needed.        Visit Vitals  BP (!) 151/84   Pulse 94   Temp 36.8 °C (98.2 °F)   Resp 15   Ht 5' 3" (1.6 m)   Wt 76.7 kg (169 lb)   SpO2 98%   Breastfeeding? No   BMI 29.94 kg/m²       Pain/Patience Score: Pain Assessment Performed: Yes (6/4/2018  9:01 AM)  Presence of Pain: denies (6/4/2018  9:01 AM)      "

## 2018-06-04 NOTE — [employer] PATIENT INSTRUCTIONS
Discharge Summary/Instructions after an Endoscopic Procedure  Patient Name: Azucena Morrison  Patient MRN: [id number]  Patient YOB: 1950 Monday, June 04, 2018  Gibran Aguayo MD  RESTRICTIONS:  During your procedure today, you received medications for sedation.  These   medications may affect your judgment, balance and coordination.  Therefore,   for 24 hours, you have the following restrictions:   - DO NOT drive a car, operate machinery, make legal/financial decisions,   sign important papers or drink alcohol.    ACTIVITY:  The following day: return to full activity including work, except no heavy   lifting, straining or running for 3 days if polyps were removed.  DIET:  Eat and drink normally unless instructed otherwise.     TREATMENT FOR COMMON SIDE EFFECTS:  - Mild abdominal pain, nausea, belching, bloating or excessive gas:  rest,   eat lightly and use a heating pad.  - Sore Throat: treat with throat lozenges and/or gargle with warm salt   water.  - Because air was used during the procedure, expelling large amounts of air   from your rectum or belching is normal.  - If a bowel prep was taken, you may not have a bowel movement for 1-3 days.    This is normal.  SYMPTOMS TO WATCH FOR AND REPORT TO YOUR PHYSICIAN:  1. Abdominal pain or bloating, other than gas cramps.  2. Chest pain.  3. Back pain.  4. Signs of infection such as: chills or fever occurring within 24 hours   after the procedure.  5. Rectal bleeding, which would show as bright red, maroon, or black stools.   (A tablespoon of blood from the rectum is not serious, especially if   hemorrhoids are present.)  6. Vomiting.  7. Weakness or dizziness.  GO DIRECTLY TO THE NEAREST EMERGENCY ROOM IF YOU HAVE ANY OF THE FOLLOWING:      Difficulty breathing              Chills and/or fever over 101 F   Persistent vomiting and/or vomiting blood   Severe abdominal pain   Severe chest pain   Black, tarry stools   Bleeding- more than one tablespoon   Any  other symptom or condition that you feel may need urgent attention  Your doctor recommends these additional instructions:  If any biopsies were taken, your doctors clinic will contact you in 1 to 2   weeks with any results.  Consider CT scan of the abdomen/pelvis as next step with injection of   radioopaque contrast into the fistula a next step. Will review with   radiology   Discharge to home   Condition stable   Resume previous diet   The signs and symptoms of potential delayed complications were discussed   with the patient. If signs or symptoms of these complications develop, call   the [facility] at [phone].   Return to normal activities tomorrow.  Written discharge instructions were   provided to the patient.   For questions, problems or results please call your physician - Gibran Aguayo MD at Work:  [phone].  EMERGENCY PHONE NUMBER: [phone],  LAB RESULTS: [phone]  IF A COMPLICATION OR EMERGENCY SITUATION ARISES AND YOU ARE UNABLE TO REACH   YOUR PHYSICIAN - GO DIRECTLY TO THE EMERGENCY ROOM.  MD Gibran Juarez MD  6/4/2018 10:51:00 AM  This report has been verified and signed electronically.  [employer]

## 2018-06-04 NOTE — ANESTHESIA PREPROCEDURE EVALUATION
06/04/2018  Azucena Morrison is a 67 y.o., female.  History of hemicolectomy   Status post Aiyana's procedure   HTN (hypertension)   Type 2 diabetes mellitus without complication   Suture granuloma   Essential hypertension   Drainage from wound   S/P colostomy   Fistula of intestine, excluding rectum and anus   S/P exploratory laparotomy   Tachycardia   Abscess of abdominal wall   Type 2 diabetes mellitus with skin complication   Diabetes mellitus   Unspecified local infection of skin and subcutaneous tissue   Diabetes with skin complication   Generalized abdominal pain   LLQ pain       Anesthesia Evaluation      I have reviewed the Medications.     Review of Systems  Anesthesia Hx:  No problems with previous Anesthesia Denies Hx of Anesthetic complications History of prior surgery of interest to airway management or planning: Previous anesthesia: General Denies Family Hx of Anesthesia complications.   Denies Personal Hx of Anesthesia complications.   Social:  Non-Smoker, No Alcohol Use    Cardiovascular:   Hypertension    Renal/:   renal calculi    Neurological:  Neurology Normal    Endocrine:   Diabetes        Physical Exam  General:  Well nourished    Airway/Jaw/Neck:  Airway Findings: Mouth Opening: Normal Tongue: Normal  General Airway Assessment: Adult  Mallampati: II      Dental:  Dental Findings: Upper Dentures   Chest/Lungs:  Chest/Lungs Findings: Clear to auscultation, Normal Respiratory Rate     Heart/Vascular:  Heart Findings: Rate: Normal  Rhythm: Regular Rhythm        Mental Status:  Mental Status Findings:  Cooperative, Alert and Oriented         Anesthesia Plan  Type of Anesthesia, risks & benefits discussed:  Anesthesia Type:  MAC  Patient's Preference: MAC  Intra-op Monitoring Plan:   Intra-op Monitoring Plan Comments:   Post Op Pain Control Plan:   Post Op Pain Control Plan Comments:    Induction:   IV  Beta Blocker:         Informed Consent: Patient understands risks and agrees with Anesthesia plan.  Questions answered. Anesthesia consent signed with patient.  ASA Score: 2     Day of Surgery Review of History & Physical:        Anesthesia Plan Notes: Pt is Maltese speaking         Ready For Surgery From Anesthesia Perspective.

## 2018-06-05 VITALS
WEIGHT: 169 LBS | BODY MASS INDEX: 29.95 KG/M2 | HEIGHT: 63 IN | DIASTOLIC BLOOD PRESSURE: 90 MMHG | OXYGEN SATURATION: 99 % | RESPIRATION RATE: 18 BRPM | HEART RATE: 91 BPM | SYSTOLIC BLOOD PRESSURE: 139 MMHG | TEMPERATURE: 98 F

## 2018-06-06 ENCOUNTER — HOSPITAL ENCOUNTER (OUTPATIENT)
Dept: WOUND CARE | Facility: HOSPITAL | Age: 68
Discharge: HOME | End: 2018-06-06
Attending: SURGERY
Payer: MEDICARE

## 2018-06-06 VITALS
SYSTOLIC BLOOD PRESSURE: 151 MMHG | WEIGHT: 169 LBS | BODY MASS INDEX: 29.95 KG/M2 | DIASTOLIC BLOOD PRESSURE: 81 MMHG | HEART RATE: 95 BPM | HEIGHT: 63 IN | TEMPERATURE: 99 F

## 2018-06-06 DIAGNOSIS — Z98.890 S/P EXPLORATORY LAPAROTOMY: ICD-10-CM

## 2018-06-06 DIAGNOSIS — I10 ESSENTIAL HYPERTENSION: ICD-10-CM

## 2018-06-06 DIAGNOSIS — T81.89XA SUTURE GRANULOMA, INITIAL ENCOUNTER: ICD-10-CM

## 2018-06-06 DIAGNOSIS — E11.622 TYPE 2 DIABETES MELLITUS WITH OTHER SKIN ULCER, WITH LONG-TERM CURRENT USE OF INSULIN: Primary | ICD-10-CM

## 2018-06-06 DIAGNOSIS — Z93.3 STATUS POST HARTMANN'S PROCEDURE: ICD-10-CM

## 2018-06-06 DIAGNOSIS — K63.2 FISTULA OF INTESTINE, EXCLUDING RECTUM AND ANUS: ICD-10-CM

## 2018-06-06 DIAGNOSIS — Z79.4 TYPE 2 DIABETES MELLITUS WITH OTHER SKIN ULCER, WITH LONG-TERM CURRENT USE OF INSULIN: Primary | ICD-10-CM

## 2018-06-06 PROCEDURE — 99213 OFFICE O/P EST LOW 20 MIN: CPT

## 2018-06-13 ENCOUNTER — HOSPITAL ENCOUNTER (OUTPATIENT)
Dept: WOUND CARE | Facility: HOSPITAL | Age: 68
Discharge: HOME | End: 2018-06-13
Attending: SURGERY
Payer: MEDICARE

## 2018-06-13 VITALS
WEIGHT: 169 LBS | SYSTOLIC BLOOD PRESSURE: 151 MMHG | TEMPERATURE: 98 F | DIASTOLIC BLOOD PRESSURE: 81 MMHG | BODY MASS INDEX: 29.95 KG/M2 | HEART RATE: 99 BPM | HEIGHT: 63 IN

## 2018-06-13 PROCEDURE — 99213 OFFICE O/P EST LOW 20 MIN: CPT

## 2018-06-15 ENCOUNTER — HOSPITAL ENCOUNTER (OUTPATIENT)
Dept: RADIOLOGY | Facility: HOSPITAL | Age: 68
Discharge: HOME | End: 2018-06-15
Attending: SURGERY
Payer: MEDICARE

## 2018-06-15 DIAGNOSIS — Z98.890 S/P EXPLORATORY LAPAROTOMY: ICD-10-CM

## 2018-06-15 DIAGNOSIS — Z79.4 TYPE 2 DIABETES MELLITUS WITH OTHER SKIN ULCER, WITH LONG-TERM CURRENT USE OF INSULIN: ICD-10-CM

## 2018-06-15 DIAGNOSIS — K63.2 FISTULA OF INTESTINE, EXCLUDING RECTUM AND ANUS: ICD-10-CM

## 2018-06-15 DIAGNOSIS — E11.622 TYPE 2 DIABETES MELLITUS WITH OTHER SKIN ULCER, WITH LONG-TERM CURRENT USE OF INSULIN: ICD-10-CM

## 2018-06-15 DIAGNOSIS — I10 ESSENTIAL HYPERTENSION: ICD-10-CM

## 2018-06-15 DIAGNOSIS — T81.89XA SUTURE GRANULOMA, INITIAL ENCOUNTER: ICD-10-CM

## 2018-06-15 DIAGNOSIS — Z93.3 STATUS POST HARTMANN'S PROCEDURE: ICD-10-CM

## 2018-06-15 PROCEDURE — 74178 CT ABD&PLV WO CNTR FLWD CNTR: CPT | Mod: TC

## 2018-06-15 PROCEDURE — 74178 CT ABD&PLV WO CNTR FLWD CNTR: CPT | Mod: 26,,, | Performed by: RADIOLOGY

## 2018-06-15 PROCEDURE — 25500020 PHARM REV CODE 255: Performed by: SURGERY

## 2018-06-15 RX ADMIN — IOHEXOL 75 ML: 350 INJECTION, SOLUTION INTRAVENOUS at 10:06

## 2018-06-15 RX ADMIN — IOHEXOL 30 ML: 350 INJECTION, SOLUTION INTRAVENOUS at 08:06

## 2018-06-20 ENCOUNTER — HOSPITAL ENCOUNTER (OUTPATIENT)
Dept: WOUND CARE | Facility: HOSPITAL | Age: 68
Discharge: HOME | End: 2018-06-20
Attending: SURGERY
Payer: MEDICARE

## 2018-06-20 VITALS
HEIGHT: 63 IN | SYSTOLIC BLOOD PRESSURE: 155 MMHG | TEMPERATURE: 99 F | WEIGHT: 169 LBS | HEART RATE: 97 BPM | BODY MASS INDEX: 29.95 KG/M2 | DIASTOLIC BLOOD PRESSURE: 84 MMHG

## 2018-06-20 PROCEDURE — 99213 OFFICE O/P EST LOW 20 MIN: CPT

## 2018-06-27 ENCOUNTER — HOSPITAL ENCOUNTER (OUTPATIENT)
Dept: WOUND CARE | Facility: HOSPITAL | Age: 68
Discharge: HOME | End: 2018-06-27
Attending: SURGERY
Payer: MEDICARE

## 2018-06-27 VITALS
DIASTOLIC BLOOD PRESSURE: 83 MMHG | BODY MASS INDEX: 29.95 KG/M2 | TEMPERATURE: 98 F | HEIGHT: 63 IN | SYSTOLIC BLOOD PRESSURE: 156 MMHG | HEART RATE: 98 BPM | WEIGHT: 169 LBS

## 2018-06-27 PROCEDURE — 99214 OFFICE O/P EST MOD 30 MIN: CPT

## 2018-07-04 ENCOUNTER — TELEPHONE (OUTPATIENT)
Dept: ADMINISTRATIVE | Facility: CLINIC | Age: 68
End: 2018-07-04

## 2018-07-11 ENCOUNTER — HOSPITAL ENCOUNTER (OUTPATIENT)
Dept: WOUND CARE | Facility: HOSPITAL | Age: 68
Discharge: HOME | End: 2018-07-11
Attending: SURGERY
Payer: MEDICARE

## 2018-07-11 VITALS
WEIGHT: 169 LBS | SYSTOLIC BLOOD PRESSURE: 156 MMHG | HEIGHT: 63 IN | TEMPERATURE: 98 F | BODY MASS INDEX: 29.95 KG/M2 | HEART RATE: 99 BPM | DIASTOLIC BLOOD PRESSURE: 78 MMHG

## 2018-07-11 DIAGNOSIS — K63.2 FISTULA OF INTESTINE, EXCLUDING RECTUM AND ANUS: Primary | ICD-10-CM

## 2018-07-11 PROCEDURE — 87186 SC STD MICRODIL/AGAR DIL: CPT

## 2018-07-11 PROCEDURE — 99214 OFFICE O/P EST MOD 30 MIN: CPT

## 2018-07-11 PROCEDURE — 87076 CULTURE ANAEROBE IDENT EACH: CPT

## 2018-07-11 PROCEDURE — 87070 CULTURE OTHR SPECIMN AEROBIC: CPT

## 2018-07-11 PROCEDURE — 87077 CULTURE AEROBIC IDENTIFY: CPT

## 2018-07-11 PROCEDURE — 87075 CULTR BACTERIA EXCEPT BLOOD: CPT

## 2018-07-11 RX ORDER — AMOXICILLIN AND CLAVULANATE POTASSIUM 875; 125 MG/1; MG/1
1 TABLET, FILM COATED ORAL 2 TIMES DAILY
Qty: 60 TABLET | Refills: 1 | Status: SHIPPED | OUTPATIENT
Start: 2018-07-11 | End: 2018-07-25

## 2018-07-15 LAB
BACTERIA SPEC AEROBE CULT: NORMAL
BACTERIA SPEC AEROBE CULT: NORMAL

## 2018-07-16 LAB — BACTERIA SPEC ANAEROBE CULT: NORMAL

## 2018-07-18 ENCOUNTER — HOSPITAL ENCOUNTER (OUTPATIENT)
Dept: WOUND CARE | Facility: HOSPITAL | Age: 68
Discharge: HOME | End: 2018-07-18
Attending: SURGERY
Payer: MEDICARE

## 2018-07-18 VITALS
BODY MASS INDEX: 29.95 KG/M2 | DIASTOLIC BLOOD PRESSURE: 82 MMHG | TEMPERATURE: 98 F | SYSTOLIC BLOOD PRESSURE: 162 MMHG | HEART RATE: 102 BPM | HEIGHT: 63 IN | WEIGHT: 169 LBS

## 2018-07-18 DIAGNOSIS — Z79.4 TYPE 2 DIABETES MELLITUS WITH OTHER SKIN ULCER, WITH LONG-TERM CURRENT USE OF INSULIN: Primary | ICD-10-CM

## 2018-07-18 DIAGNOSIS — E11.622 TYPE 2 DIABETES MELLITUS WITH OTHER SKIN ULCER, WITH LONG-TERM CURRENT USE OF INSULIN: Primary | ICD-10-CM

## 2018-07-18 PROCEDURE — 99213 OFFICE O/P EST LOW 20 MIN: CPT

## 2018-07-18 RX ORDER — SULFAMETHOXAZOLE AND TRIMETHOPRIM 800; 160 MG/1; MG/1
1 TABLET ORAL 2 TIMES DAILY
Qty: 60 TABLET | Refills: 2 | Status: SHIPPED | OUTPATIENT
Start: 2018-07-18 | End: 2019-01-09

## 2018-07-18 NOTE — PROGRESS NOTES
Much of the documentation for this visit was completed in the [facility] system. Please see the attached documentation for further details about this patient's care.     Kelly Lambert RN

## 2018-07-25 ENCOUNTER — HOSPITAL ENCOUNTER (OUTPATIENT)
Dept: WOUND CARE | Facility: HOSPITAL | Age: 68
Discharge: HOME | End: 2018-07-25
Attending: SURGERY
Payer: MEDICARE

## 2018-07-25 VITALS
WEIGHT: 169 LBS | TEMPERATURE: 98 F | HEART RATE: 92 BPM | BODY MASS INDEX: 29.95 KG/M2 | HEIGHT: 63 IN | DIASTOLIC BLOOD PRESSURE: 80 MMHG | SYSTOLIC BLOOD PRESSURE: 131 MMHG

## 2018-07-25 DIAGNOSIS — K63.2 FISTULA OF INTESTINE, EXCLUDING RECTUM AND ANUS: Primary | ICD-10-CM

## 2018-07-25 DIAGNOSIS — T81.89XA SUTURE GRANULOMA, INITIAL ENCOUNTER: ICD-10-CM

## 2018-07-25 PROCEDURE — 99213 OFFICE O/P EST LOW 20 MIN: CPT

## 2018-08-01 ENCOUNTER — HOSPITAL ENCOUNTER (OUTPATIENT)
Dept: WOUND CARE | Facility: HOSPITAL | Age: 68
Discharge: HOME | End: 2018-08-01
Attending: SURGERY
Payer: MEDICARE

## 2018-08-01 ENCOUNTER — HOSPITAL ENCOUNTER (OUTPATIENT)
Dept: RADIOLOGY | Facility: HOSPITAL | Age: 68
Discharge: HOME | DRG: 857 | End: 2018-08-01
Attending: SURGERY
Payer: MEDICARE

## 2018-08-01 VITALS
DIASTOLIC BLOOD PRESSURE: 75 MMHG | TEMPERATURE: 98 F | SYSTOLIC BLOOD PRESSURE: 138 MMHG | HEART RATE: 93 BPM | HEIGHT: 63 IN | WEIGHT: 169 LBS | BODY MASS INDEX: 29.95 KG/M2

## 2018-08-01 DIAGNOSIS — Z90.49 HISTORY OF HEMICOLECTOMY: ICD-10-CM

## 2018-08-01 DIAGNOSIS — R10.84 GENERALIZED ABDOMINAL PAIN: ICD-10-CM

## 2018-08-01 DIAGNOSIS — K63.2 FISTULA OF INTESTINE, EXCLUDING RECTUM AND ANUS: Primary | ICD-10-CM

## 2018-08-01 DIAGNOSIS — L98.499 TYPE 2 DIABETES MELLITUS WITH OTHER SKIN ULCER, UNSPECIFIED WHETHER LONG TERM INSULIN USE: ICD-10-CM

## 2018-08-01 DIAGNOSIS — T81.89XA SUTURE GRANULOMA, INITIAL ENCOUNTER: ICD-10-CM

## 2018-08-01 DIAGNOSIS — E11.622 TYPE 2 DIABETES MELLITUS WITH OTHER SKIN ULCER, UNSPECIFIED WHETHER LONG TERM INSULIN USE: ICD-10-CM

## 2018-08-01 DIAGNOSIS — I10 ESSENTIAL HYPERTENSION: ICD-10-CM

## 2018-08-01 DIAGNOSIS — E11.9 TYPE 2 DIABETES MELLITUS WITHOUT COMPLICATION, UNSPECIFIED WHETHER LONG TERM INSULIN USE: ICD-10-CM

## 2018-08-01 DIAGNOSIS — Z98.890 S/P EXPLORATORY LAPAROTOMY: ICD-10-CM

## 2018-08-01 DIAGNOSIS — L02.211 ABSCESS OF ABDOMINAL WALL: ICD-10-CM

## 2018-08-01 DIAGNOSIS — E11.628 DIABETES WITH SKIN COMPLICATION: ICD-10-CM

## 2018-08-01 PROCEDURE — 76999 ECHO EXAMINATION PROCEDURE: CPT | Mod: TC

## 2018-08-01 PROCEDURE — 99213 OFFICE O/P EST LOW 20 MIN: CPT | Mod: 25

## 2018-08-01 PROCEDURE — 76705 ECHO EXAM OF ABDOMEN: CPT | Mod: 26,,, | Performed by: RADIOLOGY

## 2018-08-01 RX ORDER — ONDANSETRON HYDROCHLORIDE 8 MG/1
8 TABLET, FILM COATED ORAL EVERY 8 HOURS PRN
Qty: 32 TABLET | Refills: 1 | Status: SHIPPED | OUTPATIENT
Start: 2018-08-01 | End: 2019-01-09

## 2018-08-01 RX ORDER — HYDROCODONE BITARTRATE AND ACETAMINOPHEN 5; 325 MG/1; MG/1
1 TABLET ORAL EVERY 6 HOURS PRN
Qty: 24 TABLET | Refills: 0 | Status: SHIPPED | OUTPATIENT
Start: 2018-08-01 | End: 2018-08-11

## 2018-08-02 ENCOUNTER — HOSPITAL ENCOUNTER (INPATIENT)
Facility: HOSPITAL | Age: 68
LOS: 16 days | Discharge: HOME HEALTH | DRG: 857 | End: 2018-08-18
Attending: EMERGENCY MEDICINE | Admitting: SURGERY
Payer: MEDICARE

## 2018-08-02 DIAGNOSIS — Z90.49 HISTORY OF HEMICOLECTOMY: ICD-10-CM

## 2018-08-02 DIAGNOSIS — K63.2 COLONIC FISTULA: ICD-10-CM

## 2018-08-02 DIAGNOSIS — T14.8XXA DRAINAGE FROM WOUND: ICD-10-CM

## 2018-08-02 DIAGNOSIS — E11.628 DIABETES WITH SKIN COMPLICATION: Primary | ICD-10-CM

## 2018-08-02 DIAGNOSIS — Z98.890 S/P EXPLORATORY LAPAROTOMY: ICD-10-CM

## 2018-08-02 DIAGNOSIS — R10.32 LLQ PAIN: ICD-10-CM

## 2018-08-02 DIAGNOSIS — R00.0 TACHYCARDIA: ICD-10-CM

## 2018-08-02 DIAGNOSIS — L02.211 ABDOMINAL WALL ABSCESS: ICD-10-CM

## 2018-08-02 DIAGNOSIS — K63.2 FISTULA OF INTESTINE, EXCLUDING RECTUM AND ANUS: ICD-10-CM

## 2018-08-02 DIAGNOSIS — E11.622 TYPE 2 DIABETES MELLITUS WITH OTHER SKIN ULCER, WITH LONG-TERM CURRENT USE OF INSULIN: ICD-10-CM

## 2018-08-02 DIAGNOSIS — A49.8 INFECTION DUE TO ESBL-PRODUCING ESCHERICHIA COLI: ICD-10-CM

## 2018-08-02 DIAGNOSIS — E87.1 HYPONATREMIA: ICD-10-CM

## 2018-08-02 DIAGNOSIS — Z93.3 STATUS POST HARTMANN'S PROCEDURE: ICD-10-CM

## 2018-08-02 DIAGNOSIS — R10.84 GENERALIZED ABDOMINAL PAIN: ICD-10-CM

## 2018-08-02 DIAGNOSIS — L02.211 ABSCESS OF ABDOMINAL WALL: ICD-10-CM

## 2018-08-02 DIAGNOSIS — Z16.12 INFECTION DUE TO ESBL-PRODUCING ESCHERICHIA COLI: ICD-10-CM

## 2018-08-02 DIAGNOSIS — Z79.4 TYPE 2 DIABETES MELLITUS WITH OTHER SKIN ULCER, WITH LONG-TERM CURRENT USE OF INSULIN: ICD-10-CM

## 2018-08-02 DIAGNOSIS — I10 ESSENTIAL HYPERTENSION: ICD-10-CM

## 2018-08-02 DIAGNOSIS — K63.5 POLYP OF COLON, UNSPECIFIED PART OF COLON, UNSPECIFIED TYPE: ICD-10-CM

## 2018-08-02 LAB
ALBUMIN SERPL BCP-MCNC: 3.4 G/DL
ALP SERPL-CCNC: 83 U/L
ALT SERPL W/O P-5'-P-CCNC: 14 U/L
ANION GAP SERPL CALC-SCNC: 9 MMOL/L
AST SERPL-CCNC: 15 U/L
BASOPHILS # BLD AUTO: 0.01 K/UL
BASOPHILS NFR BLD: 0.1 %
BILIRUB SERPL-MCNC: 0.3 MG/DL
BILIRUB UR QL STRIP: NEGATIVE
BUN SERPL-MCNC: 16 MG/DL
CALCIUM SERPL-MCNC: 9.7 MG/DL
CHLORIDE SERPL-SCNC: 97 MMOL/L
CLARITY UR: ABNORMAL
CO2 SERPL-SCNC: 26 MMOL/L
COLOR UR: YELLOW
CREAT SERPL-MCNC: 1.4 MG/DL
DIFFERENTIAL METHOD: ABNORMAL
EOSINOPHIL # BLD AUTO: 0.1 K/UL
EOSINOPHIL NFR BLD: 1.5 %
ERYTHROCYTE [DISTWIDTH] IN BLOOD BY AUTOMATED COUNT: 15.5 %
EST. GFR  (AFRICAN AMERICAN): 45 ML/MIN/1.73 M^2
EST. GFR  (NON AFRICAN AMERICAN): 39 ML/MIN/1.73 M^2
GLUCOSE SERPL-MCNC: 123 MG/DL
GLUCOSE UR QL STRIP: NEGATIVE
HCT VFR BLD AUTO: 30.6 %
HGB BLD-MCNC: 10.1 G/DL
HGB UR QL STRIP: ABNORMAL
KETONES UR QL STRIP: NEGATIVE
LEUKOCYTE ESTERASE UR QL STRIP: NEGATIVE
LIPASE SERPL-CCNC: 37 U/L
LYMPHOCYTES # BLD AUTO: 2.2 K/UL
LYMPHOCYTES NFR BLD: 25.3 %
MCH RBC QN AUTO: 25.3 PG
MCHC RBC AUTO-ENTMCNC: 33 G/DL
MCV RBC AUTO: 77 FL
MICROSCOPIC COMMENT: NORMAL
MONOCYTES # BLD AUTO: 0.5 K/UL
MONOCYTES NFR BLD: 6.1 %
NEUTROPHILS # BLD AUTO: 5.8 K/UL
NEUTROPHILS NFR BLD: 67 %
NITRITE UR QL STRIP: NEGATIVE
PH UR STRIP: 6 [PH] (ref 5–8)
PLATELET # BLD AUTO: 570 K/UL
PMV BLD AUTO: 7.7 FL
POCT GLUCOSE: 109 MG/DL (ref 70–110)
POTASSIUM SERPL-SCNC: 3.9 MMOL/L
PROT SERPL-MCNC: 8.6 G/DL
PROT UR QL STRIP: ABNORMAL
RBC # BLD AUTO: 4 M/UL
RBC #/AREA URNS HPF: 0 /HPF (ref 0–4)
SODIUM SERPL-SCNC: 132 MMOL/L
SP GR UR STRIP: >=1.03 (ref 1–1.03)
URATE CRY URNS QL MICRO: NORMAL
URN SPEC COLLECT METH UR: ABNORMAL
UROBILINOGEN UR STRIP-ACNC: NEGATIVE EU/DL
WBC # BLD AUTO: 8.68 K/UL

## 2018-08-02 PROCEDURE — 63600175 PHARM REV CODE 636 W HCPCS: Performed by: EMERGENCY MEDICINE

## 2018-08-02 PROCEDURE — 11000001 HC ACUTE MED/SURG PRIVATE ROOM

## 2018-08-02 PROCEDURE — 96374 THER/PROPH/DIAG INJ IV PUSH: CPT

## 2018-08-02 PROCEDURE — 25000003 PHARM REV CODE 250: Performed by: EMERGENCY MEDICINE

## 2018-08-02 PROCEDURE — 25500020 PHARM REV CODE 255: Performed by: EMERGENCY MEDICINE

## 2018-08-02 PROCEDURE — 80053 COMPREHEN METABOLIC PANEL: CPT

## 2018-08-02 PROCEDURE — 81000 URINALYSIS NONAUTO W/SCOPE: CPT

## 2018-08-02 PROCEDURE — 85025 COMPLETE CBC W/AUTO DIFF WBC: CPT

## 2018-08-02 PROCEDURE — 63600175 PHARM REV CODE 636 W HCPCS: Performed by: SURGERY

## 2018-08-02 PROCEDURE — 87040 BLOOD CULTURE FOR BACTERIA: CPT | Mod: 59

## 2018-08-02 PROCEDURE — 99284 EMERGENCY DEPT VISIT MOD MDM: CPT | Mod: 25

## 2018-08-02 PROCEDURE — 83690 ASSAY OF LIPASE: CPT

## 2018-08-02 PROCEDURE — 87070 CULTURE OTHR SPECIMN AEROBIC: CPT

## 2018-08-02 PROCEDURE — 25000003 PHARM REV CODE 250: Performed by: SURGERY

## 2018-08-02 PROCEDURE — 87077 CULTURE AEROBIC IDENTIFY: CPT | Mod: 59

## 2018-08-02 PROCEDURE — 87186 SC STD MICRODIL/AGAR DIL: CPT | Mod: 59

## 2018-08-02 RX ORDER — ONDANSETRON 2 MG/ML
4 INJECTION INTRAMUSCULAR; INTRAVENOUS EVERY 8 HOURS PRN
Status: DISCONTINUED | OUTPATIENT
Start: 2018-08-02 | End: 2018-08-03

## 2018-08-02 RX ORDER — HYDROCODONE BITARTRATE AND ACETAMINOPHEN 5; 325 MG/1; MG/1
1 TABLET ORAL EVERY 4 HOURS PRN
Status: DISCONTINUED | OUTPATIENT
Start: 2018-08-02 | End: 2018-08-18 | Stop reason: HOSPADM

## 2018-08-02 RX ORDER — SODIUM CHLORIDE 9 MG/ML
INJECTION, SOLUTION INTRAVENOUS CONTINUOUS
Status: DISCONTINUED | OUTPATIENT
Start: 2018-08-02 | End: 2018-08-05

## 2018-08-02 RX ORDER — ONDANSETRON 2 MG/ML
4 INJECTION INTRAMUSCULAR; INTRAVENOUS
Status: COMPLETED | OUTPATIENT
Start: 2018-08-02 | End: 2018-08-02

## 2018-08-02 RX ADMIN — SODIUM CHLORIDE: 0.9 INJECTION, SOLUTION INTRAVENOUS at 10:08

## 2018-08-02 RX ADMIN — PIPERACILLIN AND TAZOBACTAM 4.5 G: 4; .5 INJECTION, POWDER, LYOPHILIZED, FOR SOLUTION INTRAVENOUS; PARENTERAL at 10:08

## 2018-08-02 RX ADMIN — ONDANSETRON 4 MG: 2 INJECTION, SOLUTION INTRAMUSCULAR; INTRAVENOUS at 07:08

## 2018-08-02 RX ADMIN — HYDROCODONE BITARTRATE AND ACETAMINOPHEN 1 TABLET: 5; 325 TABLET ORAL at 10:08

## 2018-08-02 RX ADMIN — IOHEXOL 75 ML: 350 INJECTION, SOLUTION INTRAVENOUS at 07:08

## 2018-08-02 NOTE — ED PROVIDER NOTES
Encounter Date: 2018    SCRIBE #1 NOTE: REJI Alexjasvir Boo, am scribing for, and in the presence of,  Dr. Villa. I have scribed the entire note.       History     Chief Complaint   Patient presents with    Abdominal Pain     Abdominal pain, N/V x1 week. Pt reports had abdominal sx recently and has CT scan scheduled for Monday.      Azucena Morrison is a 68 y.o. female who  has a past medical history of Diabetes mellitus; Diabetes mellitus, type 2; Hypertension; and Kidney stone.    Patient presents to the ED due to abdominal pain and vomiting after eating x6 days. Was given hydrocodone and Zofran, have not had relief. Pt has hx of 80% hemicolectomy with chronic wound infection, follows Dr. Horton for surgical wound care. Clifford believes she has a hernia and set her up for an abd CT scan and encouraged to come to the ED for vomiting, which she presents for today. Multiple CT, US and told needs another abdominal surgery but no set appointment.     Upon chart review, pt had hemicolectomy in [place] for diverticulitis in . multiple visits for abd pain and pus draining from wound. hx abdominal surgeries in , small bowel resection, fistula resection, incarcerated ventral hernia + small bowel fistula.      The history is provided by the patient, the spouse and medical records.     Review of patient's allergies indicates:   Allergen Reactions    Chlorhexidine gluconate Rash     Chlorhexidine/alcohol wipes. Rash and blistering.     Past Medical History:   Diagnosis Date    Diabetes mellitus     Diabetes mellitus, type 2     Hypertension     Kidney stone      Past Surgical History:   Procedure Laterality Date     SECTION, CLASSIC      CHOLECYSTECTOMY  1997    [facility]GERY      [facility]    COLONOSCOPY N/A 2018    Procedure: COLONOSCOPY;  Surgeon: Gibran Aguayo MD;  Location: [facility];  Service: Endoscopy;  Laterality: N/A;    COLOSTOMY Left     INCISIONAL HERNIA REPAIR Right  2010     History reviewed. No pertinent family history.  Social History   Substance Use Topics    Smoking status: Never Smoker    Smokeless tobacco: Never Used    Alcohol use No     Review of Systems   Gastrointestinal: Positive for abdominal pain and vomiting.   Skin: Positive for wound (abd, chronic).   All other systems reviewed and are negative.      Physical Exam     Initial Vitals [08/02/18 1828]   BP Pulse Resp Temp SpO2   134/79 84 19 98.1 °F (36.7 °C) 98 %      MAP       --         Physical Exam    Nursing note and vitals reviewed.  Constitutional: She appears well-developed and well-nourished. She is not diaphoretic. No distress.   HENT:   Head: Normocephalic and atraumatic.   Eyes: Pupils are equal, round, and reactive to light.   Neck: Normal range of motion. Neck supple.   Cardiovascular: Normal rate.   Pulmonary/Chest: Breath sounds normal.   Abdominal: Soft. Bowel sounds are normal. She exhibits no distension. There is no tenderness.   chronic fistula draining brownish drainage at suprapubic area  Obese abdomen   Musculoskeletal: Normal range of motion.   Neurological: She is alert and oriented to person, place, and time.   Skin: Skin is dry.         ED Course   Procedures  Labs Reviewed   CBC W/ AUTO DIFFERENTIAL   COMPREHENSIVE METABOLIC PANEL   LIPASE   URINALYSIS, REFLEX TO URINE CULTURE   URINALYSIS MICROSCOPIC          Imaging Results    None          Medical Decision Making:   Initial Assessment:                           Clinical Impression:    Abdominal wall abscess  Colocutaneous fistula  Abdominal pain  Nausea vomiting    Disposition:   Disposition: Admitted  68-year-old white  female with the above history presents to the ER with vomiting brown pus leaking out of her chronic colocutaneous fistula below her umbilicus and abdominal pain CBC normal chemistry normal lipase normal urinalysis negative CT abdomen pelvis shows a abdominal wall abscess in the area of the fistula I called  and spoke to Dr. Horton who is her general surgeon for many years and he wants her admitted to the hospital under his service and placed on Zosyn which I have done we have also consulted Internal Medicine due to remote multiple medical problems.  She is hemodynamically stable the time of admission her abdominal exam at the time of admission shows positive bowel sounds soft nontender nondistended no rebound guarding bruits or masses. She is afebrile good blood pressure good pulse.  She has been given 4 mg of Zosyn IV.                        Gustavo Villa MD  08/02/18 [id number]

## 2018-08-03 LAB
ESTIMATED AVG GLUCOSE: 180 MG/DL
FERRITIN SERPL-MCNC: 97 NG/ML
FOLATE SERPL-MCNC: 10.1 NG/ML
HAV IGM SERPL QL IA: NEGATIVE
HBA1C MFR BLD HPLC: 7.9 %
HBV CORE IGM SERPL QL IA: NEGATIVE
HBV SURFACE AG SERPL QL IA: NEGATIVE
HCV AB SERPL QL IA: NEGATIVE
IRON SERPL-MCNC: 87 UG/DL
OSMOLALITY SERPL: 291 MOSM/KG
OSMOLALITY UR: 618 MOSM/KG
POCT GLUCOSE: 119 MG/DL (ref 70–110)
POCT GLUCOSE: 156 MG/DL (ref 70–110)
POCT GLUCOSE: 181 MG/DL (ref 70–110)
POCT GLUCOSE: 230 MG/DL (ref 70–110)
SATURATED IRON: 26 %
SODIUM UR-SCNC: 138 MMOL/L
TOTAL IRON BINDING CAPACITY: 332 UG/DL
TRANSFERRIN SERPL-MCNC: 224 MG/DL
VIT B12 SERPL-MCNC: 163 PG/ML

## 2018-08-03 PROCEDURE — 94761 N-INVAS EAR/PLS OXIMETRY MLT: CPT

## 2018-08-03 PROCEDURE — 83935 ASSAY OF URINE OSMOLALITY: CPT

## 2018-08-03 PROCEDURE — 82607 VITAMIN B-12: CPT

## 2018-08-03 PROCEDURE — 63600175 PHARM REV CODE 636 W HCPCS: Performed by: EMERGENCY MEDICINE

## 2018-08-03 PROCEDURE — 83930 ASSAY OF BLOOD OSMOLALITY: CPT

## 2018-08-03 PROCEDURE — 25000003 PHARM REV CODE 250: Performed by: EMERGENCY MEDICINE

## 2018-08-03 PROCEDURE — 36415 COLL VENOUS BLD VENIPUNCTURE: CPT

## 2018-08-03 PROCEDURE — 25000003 PHARM REV CODE 250: Performed by: SURGERY

## 2018-08-03 PROCEDURE — 11000001 HC ACUTE MED/SURG PRIVATE ROOM

## 2018-08-03 PROCEDURE — 63600175 PHARM REV CODE 636 W HCPCS: Performed by: SURGERY

## 2018-08-03 PROCEDURE — 83036 HEMOGLOBIN GLYCOSYLATED A1C: CPT

## 2018-08-03 PROCEDURE — 25000003 PHARM REV CODE 250: Performed by: STUDENT IN AN ORGANIZED HEALTH CARE EDUCATION/TRAINING PROGRAM

## 2018-08-03 PROCEDURE — 63600175 PHARM REV CODE 636 W HCPCS: Performed by: STUDENT IN AN ORGANIZED HEALTH CARE EDUCATION/TRAINING PROGRAM

## 2018-08-03 PROCEDURE — 82746 ASSAY OF FOLIC ACID SERUM: CPT

## 2018-08-03 PROCEDURE — 80074 ACUTE HEPATITIS PANEL: CPT

## 2018-08-03 PROCEDURE — 83540 ASSAY OF IRON: CPT

## 2018-08-03 PROCEDURE — 84300 ASSAY OF URINE SODIUM: CPT

## 2018-08-03 PROCEDURE — 82728 ASSAY OF FERRITIN: CPT

## 2018-08-03 RX ORDER — IBUPROFEN 200 MG
16 TABLET ORAL
Status: DISCONTINUED | OUTPATIENT
Start: 2018-08-03 | End: 2018-08-18 | Stop reason: HOSPADM

## 2018-08-03 RX ORDER — GLUCAGON 1 MG
1 KIT INJECTION
Status: DISCONTINUED | OUTPATIENT
Start: 2018-08-03 | End: 2018-08-18 | Stop reason: HOSPADM

## 2018-08-03 RX ORDER — INSULIN ASPART 100 [IU]/ML
0-5 INJECTION, SOLUTION INTRAVENOUS; SUBCUTANEOUS
Status: DISCONTINUED | OUTPATIENT
Start: 2018-08-03 | End: 2018-08-18 | Stop reason: HOSPADM

## 2018-08-03 RX ORDER — ONDANSETRON 2 MG/ML
4 INJECTION INTRAMUSCULAR; INTRAVENOUS EVERY 6 HOURS PRN
Status: DISCONTINUED | OUTPATIENT
Start: 2018-08-03 | End: 2018-08-10

## 2018-08-03 RX ORDER — IBUPROFEN 200 MG
24 TABLET ORAL
Status: DISCONTINUED | OUTPATIENT
Start: 2018-08-03 | End: 2018-08-18 | Stop reason: HOSPADM

## 2018-08-03 RX ORDER — TRAMADOL HYDROCHLORIDE 50 MG/1
TABLET ORAL
Status: DISPENSED
Start: 2018-08-03 | End: 2018-08-04

## 2018-08-03 RX ORDER — SCOLOPAMINE TRANSDERMAL SYSTEM 1 MG/1
1 PATCH, EXTENDED RELEASE TRANSDERMAL
Status: DISCONTINUED | OUTPATIENT
Start: 2018-08-03 | End: 2018-08-18 | Stop reason: HOSPADM

## 2018-08-03 RX ADMIN — PIPERACILLIN AND TAZOBACTAM 4.5 G: 4; .5 INJECTION, POWDER, LYOPHILIZED, FOR SOLUTION INTRAVENOUS; PARENTERAL at 07:08

## 2018-08-03 RX ADMIN — ONDANSETRON 4 MG: 2 INJECTION INTRAMUSCULAR; INTRAVENOUS at 03:08

## 2018-08-03 RX ADMIN — SCOPOLAMINE 1 PATCH: 1 PATCH, EXTENDED RELEASE TRANSDERMAL at 07:08

## 2018-08-03 RX ADMIN — SODIUM CHLORIDE: 0.9 INJECTION, SOLUTION INTRAVENOUS at 02:08

## 2018-08-03 RX ADMIN — PIPERACILLIN AND TAZOBACTAM 4.5 G: 4; .5 INJECTION, POWDER, LYOPHILIZED, FOR SOLUTION INTRAVENOUS; PARENTERAL at 10:08

## 2018-08-03 RX ADMIN — HYDROCODONE BITARTRATE AND ACETAMINOPHEN 1 TABLET: 5; 325 TABLET ORAL at 12:08

## 2018-08-03 RX ADMIN — SODIUM CHLORIDE: 0.9 INJECTION, SOLUTION INTRAVENOUS at 10:08

## 2018-08-03 RX ADMIN — INSULIN ASPART 1 UNITS: 100 INJECTION, SOLUTION INTRAVENOUS; SUBCUTANEOUS at 08:08

## 2018-08-03 RX ADMIN — PIPERACILLIN AND TAZOBACTAM 4.5 G: 4; .5 INJECTION, POWDER, LYOPHILIZED, FOR SOLUTION INTRAVENOUS; PARENTERAL at 03:08

## 2018-08-03 NOTE — ED NOTES
Attempted to call report on patient to 5th floor nurse.  Spoke with Ann-Marie who states that nurse will call back for report. Charge RN is making assignment to the nurse at this time.

## 2018-08-03 NOTE — H&P
Chief Complaint   Patient presents with    Abdominal Pain       Abdominal pain, N/V x1 week. Pt reports had abdominal sx recently and has CT scan scheduled for Monday.       Azucena Morrison is a 68 y.o. female who  has a past medical history of Diabetes mellitus; Diabetes mellitus, type 2; Hypertension; and Kidney stone.     Patient presents to the ED due to abdominal pain and vomiting after eating x6 days. Was given hydrocodone and Zofran, have not had relief. Pt has hx of 80% hemicolectomy with chronic wound infection, follows Dr. Horton for surgical wound care. Clifford believes she has a hernia and set her up for an abd CT scan and encouraged to come to the ED for vomiting, which she presents for today. Multiple CT, US and told needs another abdominal surgery but no set appointment.      Upon chart review, pt had hemicolectomy in [place] for diverticulitis in . multiple visits for abd pain and pus draining from wound. hx abdominal surgeries in , small bowel resection, fistula resection, incarcerated ventral hernia + small bowel fistula.     The history is provided by the patient, the spouse and medical records.            Review of patient's allergies indicates:   Allergen Reactions    Chlorhexidine gluconate Rash       Chlorhexidine/alcohol wipes. Rash and blistering.           Past Medical History:   Diagnosis Date    Diabetes mellitus      Diabetes mellitus, type 2      Hypertension      Kidney stone              Past Surgical History:   Procedure Laterality Date     SECTION, CLASSIC        CHOLECYSTECTOMY       COL[facility] SURGERY        [facility]    COLONOSCOPY N/A 2018     Procedure: COLONOSCOPY;  Surgeon: Gibran Aguayo MD;  Location: [facility];  Service: Endoscopy;  Laterality: N/A;    COLOSTOMY Left     INCISIONAL HERNIA REPAIR Right       History reviewed. No pertinent family history.       Social History   Substance Use Topics    Smoking status: Never  Smoker    Smokeless tobacco: Never Used    Alcohol use No      Review of Systems   Gastrointestinal: Positive for abdominal pain and vomiting.   Skin: Positive for wound (abd, chronic).   All other systems reviewed and are negative.        Physical Exam             Initial Vitals [08/02/18 1828]   BP Pulse Resp Temp SpO2   134/79 84 19 98.1 °F (36.7 °C) 98 %       MAP           --              Physical Exam  Nursing note and vitals reviewed.  Constitutional: She appears well-developed and well-nourished. She is not diaphoretic. No distress.   HENT:   Head: Normocephalic and atraumatic.   Eyes: Pupils are equal, round, and reactive to light.   Neck: Normal range of motion. Neck supple.   Cardiovascular: Normal rate.   Pulmonary/Chest: Breath sounds normal.   Abdominal: Soft. Bowel sounds are normal. She exhibits no distension. There is no tenderness.   chronic fistula draining brownish drainage at suprapubic area  Obese abdomen   Musculoskeletal: Normal range of motion.   Neurological: She is alert and oriented to person, place, and time.   Skin: Skin is dry.         Imp: abdominal pain , DM,HTN,Entero cutaneous fistula, s/p colostomy closure    Plan: admit , iv antibiotics , medical follow up, possible surgery

## 2018-08-03 NOTE — CONSULTS
[facility] Medicine H&P Note     Admitting Team: hospitals Hospitalist Team B  Attending Physician: Ulisses Horton MD  Resident: Aron Church MD  Intern: Nolvia Gusman MD      Date of Admit: 2018    Chief Complaint     Abdominal Pain (Abdominal pain, N/V x1 week. Pt reports had abdominal sx recently and has CT scan scheduled for Monday. )   for     Subjective:      History of Present Illness:  Azucena Morrison is a 68 y.o. Malaysian female who  has a past medical history of Diabetes mellitus; Diabetes mellitus, type 2; Hypertension; and Kidney stone.. The patient presented to [facility] on 2018 with a primary complaint of Abdominal Pain (Abdominal pain, N/V x1 week. Pt reports had abdominal sx recently and has CT scan scheduled for Monday. )      The patient is a 68 year old woman with pertinent medical history of hypertension, DM type II, who presented to the ED with abdominal pain, nausea and vomiting after eating for 6 days. She is followed by Dr. Horton for post operative surgical wound care, with notable history of 80% hemicolectomy complicated by chronic wound infection. Patient admitted to surgery service for possible intervention, wound care, and IV antibiotics. Medicine consulted for co-management.    Past Medical History:  Past Medical History:   Diagnosis Date    Diabetes mellitus     Diabetes mellitus, type 2     Hypertension     Kidney stone        Past Surgical History:  Past Surgical History:   Procedure Laterality Date     SECTION, CLASSIC      CHOLECYSTECTOMY      [facility]GERY      [facility]    COLONOSCOPY N/A 2018    Procedure: COLONOSCOPY;  Surgeon: Gibran Aguayo MD;  Location: [facility];  Service: Endoscopy;  Laterality: N/A;    COLOSTOMY Left     INCISIONAL HERNIA REPAIR Right        Allergies:  Review of patient's allergies indicates:   Allergen Reactions    Chlorhexidine gluconate Rash     Chlorhexidine/alcohol wipes. Rash  and blistering.       Home Medications:  Prior to Admission medications    Medication Sig Start Date End Date Taking? Authorizing Provider   acetaminophen (TYLENOL) 325 MG tablet Take 2 tablets (650 mg total) by mouth every 8 (eight) hours as needed. 11/28/16  Yes Ulisses Horton MD   hydroCHLOROthiazide (HYDRODIURIL) 25 MG tablet  3/1/18  Yes Historical Provider, MD   HYDROcodone-acetaminophen (NORCO) 5-325 mg per tablet Take 1 tablet by mouth every 6 (six) hours as needed. 8/1/18 8/11/18 Yes Ulisses Horton MD   LANTUS SOLOSTAR U-100 INSULIN 100 unit/mL (3 mL) InPn pen  3/1/18  Yes Historical Provider, MD   losartan (COZAAR) 100 MG tablet Take 100 mg by mouth once daily.  3/1/18  Yes Historical Provider, MD   metFORMIN (GLUCOPHAGE) 1000 MG tablet  3/1/18  Yes Historical Provider, MD   metformin (GLUCOPHAGE) 500 MG tablet Take 1,000 mg by mouth 2 (two) times daily with meals.   Yes Historical Provider, MD   ondansetron (ZOFRAN) 4 MG tablet Take 2 tablets (8 mg total) by mouth every 8 (eight) hours as needed for Nausea. 8/31/17  Yes Toshia Encarnacion MD   ondansetron (ZOFRAN) 8 MG tablet  2/14/18  Yes Historical Provider, MD   ondansetron (ZOFRAN) 8 MG tablet Take 1 tablet (8 mg total) by mouth every 8 (eight) hours as needed for Nausea. 8/1/18  Yes Ulisses Horton MD   sulfamethoxazole-trimethoprim 800-160mg (BACTRIM DS) 800-160 mg Tab Take 1 tablet by mouth 2 (two) times daily. 7/18/18  Yes Ulisses Horton MD   TRUEPLUS LANCETS 26 gauge Misc  4/7/16  Yes Historical Provider, MD   TRUETEST TEST STRIPS Strp  4/7/16  Yes Historical Provider, MD   mupirocin (BACTROBAN) 2 % ointment  3/29/16   Historical Provider, MD   promethazine (PHENERGAN) 6.25 mg/5 mL syrup  3/1/18   Historical Provider, MD   traMADol (ULTRAM) 50 mg tablet TAKE 1 TABLET BY MOUTH EVERY 6 HOURS 6/6/18   Ulisses Horton MD       Family History:  History reviewed. No pertinent family history.    Social History:  Social  "History   Substance Use Topics    Smoking status: Never Smoker    Smokeless tobacco: Never Used    Alcohol use No       Review of Systems:  A comprehensive review of systems was negative except for: Gastrointestinal: positive for abdominal pain, nausea and vomiting All other systems are reviewed and are negative.    Health Maintaince :   Primary Care Physician: Myron    Immunizations:   TDap UTD    Flu not UTD  Pna UTD    Cancer Screening:  PAP: no longer  MMG: UTD  Colonoscopy: UTD     Objective:   Last 24 Hour Vital Signs:  BP  Min: 117/79  Max: 146/83  Temp  Av.5 °F (36.4 °C)  Min: 96.3 °F (35.7 °C)  Max: 98.1 °F (36.7 °C)  Pulse  Av  Min: 66  Max: 84  Resp  Av  Min: 18  Max: 20  SpO2  Av %  Min: 96 %  Max: 98 %  Height  Av' 11.4" (150.9 cm)  Min: 4' 11" (149.9 cm)  Max: 4' 11.84" (152 cm)  Weight  Av.8 kg (164 lb 14 oz)  Min: 72.6 kg (160 lb)  Max: 77 kg (169 lb 12.1 oz)  Body mass index is 34.29 kg/m².  No intake/output data recorded.    Physical Examination:  General appearance: alert, appears stated age and cooperative  Head: Normocephalic, without obvious abnormality, atraumatic  Eyes: negative findings: lids and lashes normal, conjunctivae and sclerae normal and pupils equal, round, reactive to light and accomodation  Throat: lips, mucosa, and tongue normal; teeth and gums normal  Neck: no adenopathy, no carotid bruit, no JVD, supple, symmetrical, trachea midline and thyroid not enlarged, symmetric, no tenderness/mass/nodules  Back: symmetric, no curvature. ROM normal. No CVA tenderness.  Lungs: clear to auscultation bilaterally  Heart: regular rate and rhythm, S1, S2 normal, no murmur, click, rub or gallop  Abdomen: abnormal findings:  obese and tender to palpation midline below the umbillicus at site of wound and multiple well healed surgical scars over her abdomen, large seeping bandage midline below the umbilicus  Extremities: extremities normal, atraumatic, no cyanosis " or edema  Pulses: 2+ and symmetric  Skin: normal turgor, numerous surgical scars on abdomen  Lymph nodes: Cervical, supraclavicular, and axillary nodes normal.  Neurologic: Alert and oriented X 3, normal strength and tone. Normal symmetric reflexes. Normal coordination and gait  Mental status: Alert, oriented, thought content appropriate  Cranial nerves: normal  Sensory: intact bilaterally upper and lower extremities  Motor:5/5 bilaterally upper and lower extremeties       Laboratory:  Most Recent Data:  CBC: Lab Results   Component Value Date    WBC 8.68 08/02/2018    HGB 10.1 (L) 08/02/2018    HCT 30.6 (L) 08/02/2018     (H) 08/02/2018    MCV 77 (L) 08/02/2018    RDW 15.5 (H) 08/02/2018     WBC Differential:   BMP: Lab Results   Component Value Date     (L) 08/02/2018    K 3.9 08/02/2018    CL 97 08/02/2018    CO2 26 08/02/2018    BUN 16 08/02/2018    CREATININE 1.4 08/02/2018     (H) 08/02/2018    CALCIUM 9.7 08/02/2018     LFTs: Lab Results   Component Value Date    PROT 8.6 (H) 08/02/2018    ALBUMIN 3.4 (L) 08/02/2018    BILITOT 0.3 08/02/2018    AST 15 08/02/2018    ALKPHOS 83 08/02/2018    ALT 14 08/02/2018     Coags:   Lab Results   Component Value Date    INR 1.1 11/14/2016     FLP: Lab Results   Component Value Date    CHOL 291 (H) 06/11/2008    HDL 23 (L) 06/11/2008    LDLCALC Invalid, Trig > 400 06/11/2008    TRIG 1,512 (H) 06/11/2008    CHOLHDL 7.9 (L) 06/11/2008     DM: Lab Results   Component Value Date    HGBA1C 7.4 (H) 11/17/2016    HGBA1C 7.4 (H) 11/14/2016    HGBA1C 6.8 (H) 08/03/2016    LDLCALC Invalid, Trig > 400 06/11/2008    CREATININE 1.4 08/02/2018     Thyroid: Lab Results   Component Value Date    TSH 1.70 06/11/2008     Anemia: No results found for: IRON, TIBC, FERRITIN, [id number], FOLATE  Cardiac: Lab Results   Component Value Date    TROPONINI 0.007 11/17/2016    BNP 63 11/16/2016     Urinalysis: Lab Results   Component Value Date    LABURIN NO GROWTH AFTER 48 HOURS.  06/12/2008    COLORU Yellow 08/02/2018    SPECGRAV >=1.030 (A) 08/02/2018    NITRITE Negative 08/02/2018    KETONESU Negative 08/02/2018    UROBILINOGEN Negative 08/02/2018    WBCUA 0 08/31/2017       Trended Lab Data:    Recent Labs  Lab 08/01/18  1118 08/02/18  1917   WBC 7.53 8.68   HGB 9.4* 10.1*   HCT 30.5* 30.6*   * 570*   MCV 77* 77*   RDW 15.6* 15.5*   * 132*   K 4.5 3.9    97   CO2 23 26   BUN 21 16   CREATININE 1.5*  1.5* 1.4   * 123*   PROT 8.8* 8.6*   ALBUMIN 3.3* 3.4*   BILITOT 0.1 0.3   AST 16 15   ALKPHOS 82 83   ALT 11 14         Microbiology Data:  8/2 blood cx x2: pending  8/2 aerobic would cx: pending     Radiology:  Imaging Results          CT Abdomen Pelvis With Contrast (Final result)  Result time 08/02/18 20:18:38    Final result by Gasper Vasquez MD (08/02/18 20:18:38)                 Impression:      1. Extensive postsurgical changes of multiple prior bowel resections.  Bulging of chronically dilated bowel loops seen within the midline anterior abdomen with possible tethering of bowel loops to the anterior abdominal wall in this region and possible tethering of bowel loops with chronic changes seen in the left lower quadrant.  Potential enterocutaneous fistula not excluded.  Small air and fluid collection measuring 2 cm within the anterior abdominal wall which may represent small abscess versus focal herniated loop of small bowel.  No convincing evidence of bowel obstruction.  2. Additional findings as detailed above.      Electronically signed by: Gasper Vasquez MD  Date:    08/02/2018  Time:    20:18             Narrative:    EXAMINATION:  CT ABDOMEN PELVIS WITH CONTRAST    CLINICAL HISTORY:  Abdominal pain;    TECHNIQUE:  Low dose axial images, sagittal and coronal reformations were obtained from the lung bases to the pubic symphysis following the IV administration of 75 mL of Omnipaque 350 .  Oral contrast was not given.    COMPARISON:  CT abdomen and pelvis  from 06/15/2018.    FINDINGS:  The visualized portion of the heart is unremarkable.  The lung bases are clear.    No significant hepatic abnormalities are identified.  There is no intra-or extrahepatic biliary ductal dilatation.  Gallbladder has been removed.  Small hiatal hernia is seen.  Spleen, pancreas, and adrenal glands are unremarkable.    Kidneys are functioning with no evidence of hydronephrosis.  Left renal cyst is visualized.  Urinary bladder is unremarkable.  Uterus shows no significant abnormalities.    Extensive postsurgical changes are visualized consistent with multiple prior bowel resections.  There is bulging of chronically dilated bowel loops seen within the midline anterior abdomen supraumbilical region.  Possible tethering of bowel loops seen to the anterior abdominal wall inferior to this location.  Small herniated bowel loop versus small air and fluid collection, possible abscess seen at the level of the umbilicus measuring 2 cm.  No free air identified.  Chronic soft tissue thickening is visualized inferiorly.  Potential enterocutaneous fistula not excluded.  Chronic changes and possible tethering seen of bowel loops within the left lower quadrant, relatively unchanged in appearance from previous exams.    Aorta tapers normally.    No acute osseous abnormality identified.                                     Assessment:     Azucena Morrison is a 68 y.o. female with:  Patient Active Problem List    Diagnosis Date Noted    Abdominal wall abscess 08/02/2018    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    Polyp of colon 06/04/2018    Generalized abdominal pain     LLQ pain     Diabetes with skin complication     Unspecified local infection of skin and subcutaneous tissue     Diabetes mellitus     Type 2 diabetes mellitus with skin complication     Abscess of abdominal wall 07/11/2017    Tachycardia 12/09/2016    S/P exploratory laparotomy     S/P colostomy 11/14/2016    Fistula of intestine,  excluding rectum and anus 11/14/2016    Drainage from wound 08/03/2016    Essential hypertension 08/02/2016    History of hemicolectomy 05/17/2016    Status post Aiyana's procedure 05/17/2016    HTN (hypertension) 05/17/2016    Type 2 diabetes mellitus without complication 05/17/2016    Suture granuloma 05/17/2016        Plan:     Upper level resident assessment and plan:     1) Abdominal wounds; surgical wounds, abdominal pain,   - continue wound care and surgical management per primary team  - agree with IV zosyn for abx coverage; abdominal wounds with ESBL E coli on  continue  - pain and anti-emetic management per primary team     2) Hypertension  - blood pressure currently at inpatient goal ~140/80  - continue to hold home anti-hypertensives at this time, can add on in stepwise fashion if becomes elevated     3) diabetes mellitus type II  - hold metformin while inpatient  - initiated sliding scale in insulin with q 4 accuchecks     4) microcytic anemia  - H/H 9.4/30.5 on admission, with MCV 77  - anemia panel pending; will f/u     5) thrombocytosis  - likely reactive due to concurrent wound/infection burden  - continue to monitor     6) hyponatremia, hypotonic  - serum osmole, urine osmole, urine Na pending  - recommend primary team reassess fluid status; patient likely does not need IVF at this time (Na downtrending)     7) globulin gap  - consider Hepatitis and HIV,       Code Status:     full    Kerry Carlisle MD  [facility] Internal Medicine&Pediatrics HO-I    [facility] Medicine Hospitalist Pager numbers:   [facility] Hospitalist Medicine Team A (Omero/Cuauhtemoc): [phone]  [facility] Hospitalist Medicine Team B (Elizabet/Peng):  [phone]

## 2018-08-03 NOTE — PLAN OF CARE
Problem: Patient Care Overview  Goal: Plan of Care Review  PLAN OF CARE REVIEWED W/ PT. [place] SPEAKING PT.  OFFERED . PT. AWARE OF SERVICE . AAOX3 O2SATS 97%ON RA . INTERMITTENT NAUSEA PRESENT ZOFRAN GIVEN . IVFS AND IV ABX INFUSING TO RT AC .ABD WOUND OOZING PURULENT BLOODY DRAINAGE . GAUZE DSG APPLIED AS ORDERED.TEDS AND SCDS IN USE . GLUCOSE MONITORING AS ORDERED .  . SAFETY PRECAUTIONS MAINTAINED . CALL LIGHT IN REACH.

## 2018-08-03 NOTE — PLAN OF CARE
Problem: Patient Care Overview  Goal: Individualization & Mutuality  Outcome: Ongoing (interventions implemented as appropriate)  Patient safety maintained. Pain controlled with pain meds. N and V controlled with meds ,  No diarrhea. No distress noted. Will continue to monitor.

## 2018-08-03 NOTE — ED NOTES
Pt presents to the ED c/o abd pain with N/V x1 week. Pt has incision to lower abd with purulent drainage

## 2018-08-03 NOTE — PLAN OF CARE
TN met with patient at bedside. Patient AAOx3 and independent with adl's.  Discharging planning assessment completed by patient in [place].  Patient attends wound care with Dr Horton for abdominal wound and is current with [facility].       Patient would like to continue with [facility] upon discharge.   No DME needed prior to admit. Patient has glucometer in place.  Pt states she drives and lives with son and Daughter in law, Nereida.  Daughter in law speaks English.      patient may nee to have surgery for abdominal wound . TN to follow up.      Future Appointments  Date Time Provider Department Center   8/6/2018 12:00 PM [facility] CT2 LIMIT 400 LBS [facility] SCAN [facility]   8/8/2018 8:30 AM Ulisses Horton MD [facility] WOUND [facility]           08/03/18 1430   Discharge Assessment   Assessment Type Discharge Planning Assessment   Confirmed/corrected address and phone number on facesheet? Yes   Assessment information obtained from? Patient   Communicated expected length of stay with patient/caregiver yes   Prior to hospitilization cognitive status: Alert/Oriented   Prior to hospitalization functional status: Independent   Current cognitive status: Alert/Oriented   Current Functional Status: Independent   Lives With child(felicitas), adult;other (see comments)  (daughter in [place] , Nereida)   Able to Return to Prior Arrangements yes   Is patient able to care for self after discharge? Yes   Who are your caregiver(s) and their phone number(s)? Nereida- Daughter- [phone]   Patient's perception of discharge disposition home or selfcare;home health  (patient has [facility] now )   Readmission Within The Last 30 Days no previous admission in last 30 days   Patient currently being followed by outpatient case management? No   Patient currently receives any other outside agency services? No   Equipment Currently Used at Home none   Do you have any problems affording any of your prescribed medications? No   Is the patient taking  medications as prescribed? yes   Does the patient have transportation home? Yes   Transportation Available family or friend will provide;car   Does the patient receive services at the [facility]? No   Discharge Plan A Home with family;Home Health   Discharge Plan B Home with family   Patient/Family In Agreement With Plan yes   Does the patient have transportation to healthcare appointments? Yes

## 2018-08-04 LAB
POCT GLUCOSE: 135 MG/DL (ref 70–110)
POCT GLUCOSE: 160 MG/DL (ref 70–110)
POCT GLUCOSE: 174 MG/DL (ref 70–110)
POCT GLUCOSE: 175 MG/DL (ref 70–110)

## 2018-08-04 PROCEDURE — 25000003 PHARM REV CODE 250: Performed by: SURGERY

## 2018-08-04 PROCEDURE — 94761 N-INVAS EAR/PLS OXIMETRY MLT: CPT

## 2018-08-04 PROCEDURE — 25000003 PHARM REV CODE 250: Performed by: INTERNAL MEDICINE

## 2018-08-04 PROCEDURE — 11000001 HC ACUTE MED/SURG PRIVATE ROOM

## 2018-08-04 PROCEDURE — 63600175 PHARM REV CODE 636 W HCPCS: Performed by: SURGERY

## 2018-08-04 RX ORDER — LANOLIN ALCOHOL/MO/W.PET/CERES
1000 CREAM (GRAM) TOPICAL DAILY
Status: DISCONTINUED | OUTPATIENT
Start: 2018-08-04 | End: 2018-08-08

## 2018-08-04 RX ADMIN — CYANOCOBALAMIN TAB 1000 MCG 1000 MCG: 1000 TAB at 12:08

## 2018-08-04 RX ADMIN — PIPERACILLIN AND TAZOBACTAM 4.5 G: 4; .5 INJECTION, POWDER, LYOPHILIZED, FOR SOLUTION INTRAVENOUS; PARENTERAL at 06:08

## 2018-08-04 RX ADMIN — PIPERACILLIN AND TAZOBACTAM 4.5 G: 4; .5 INJECTION, POWDER, LYOPHILIZED, FOR SOLUTION INTRAVENOUS; PARENTERAL at 10:08

## 2018-08-04 RX ADMIN — PIPERACILLIN AND TAZOBACTAM 4.5 G: 4; .5 INJECTION, POWDER, LYOPHILIZED, FOR SOLUTION INTRAVENOUS; PARENTERAL at 03:08

## 2018-08-04 RX ADMIN — ONDANSETRON 4 MG: 2 INJECTION INTRAMUSCULAR; INTRAVENOUS at 08:08

## 2018-08-04 RX ADMIN — HYDROCODONE BITARTRATE AND ACETAMINOPHEN 1 TABLET: 5; 325 TABLET ORAL at 08:08

## 2018-08-04 RX ADMIN — HYDROCODONE BITARTRATE AND ACETAMINOPHEN 1 TABLET: 5; 325 TABLET ORAL at 03:08

## 2018-08-04 NOTE — PLAN OF CARE
Problem: Patient Care Overview  Goal: Plan of Care Review  Outcome: Ongoing (interventions implemented as appropriate)  Pts safety maintained, bed alarm on. Contact precautions continued. Fluids infusing, meds given per MAR. Wound dressed during daytime, dressing CDI.  No complains of pain, N/V, distress. BG monitored and supplemental insuline given. Vitals stable through the nighty.

## 2018-08-04 NOTE — PLAN OF CARE
Problem: Patient Care Overview  Goal: Plan of Care Review  Outcome: Ongoing (interventions implemented as appropriate)  Pt is AAOx3 with complaints of intermittent abd pain with relief from norco PO. Pt with intermittent nausea with relief from zofran IV. Pt had drsg to abd changed today per nurse. Pt up ad sindy in room. Pt remains on contact isolation.

## 2018-08-04 NOTE — PROGRESS NOTES
[facility] Medicine Progress Note    Primary Team: [facility] Hospitalist Team B  Attending Physician: Ulisses Horton MD  Resident: Aparna  Intern: Michelle    Subjective:      Patient states that she is feeling fine today. States that her abdominal pain has decreased from what she describes as a pain of 5/10 to 3/10. She denies melena, hematochezia, chest pain, SOB, feeling confused and HA.      Objective:     Last 24 Hour Vital Signs:  BP  Min: 103/59  Max: 147/73  Temp  Av.4 °F (36.3 °C)  Min: 96.3 °F (35.7 °C)  Max: 98 °F (36.7 °C)  Pulse  Av  Min: 67  Max: 79  Resp  Av.2  Min: 17  Max: 20  SpO2  Av.8 %  Min: 95 %  Max: 97 %  I/O last 3 completed shifts:  In: 3439.6 [I.V.:2939.6; IV Piggyback:500]  Out: 1550 [Urine:1550]    Physical Examination:  General: Patient sitting up in bed in NAD  CV: RRR with no rubs murmurs or s3 or s4  Resp: CTAB with no wheezes crackles or rhonchi  Abdomen: Soft, currently nontender. Bandage covering lower abdomen no sign of oozing or leakage.    Laboratory:  Laboratory Data Reviewed: yes  Pertinent Findings:    Fe: 87, TIBC: 332, Sat Fe: 26; Transferrin: 224, Ferritin: 97, Folate: 10, B12: 163    Microbiology Data Reviewed: yes  Pertinent Findings:  Currently NGD      Radiology Data Reviewed: yes  Pertinent Findings:  No new Radiology studies.    Current Medications:     Infusions:   sodium chloride 0.9% 125 mL/hr at 18        Scheduled:   piperacillin-tazobactam (ZOSYN) IVPB  4.5 g Intravenous Q8H    scopolamine  1 patch Transdermal Q3 Days        PRN:  dextrose 50%, dextrose 50%, glucagon (human recombinant), glucose, glucose, HYDROcodone-acetaminophen, insulin aspart U-100, ondansetron    Antibiotics and Day Number of Therapy:  Zosyn on 8/2    Lines and Day Number of Therapy:  Peripheral IV: R antecubital: 1 day    Assessment:     Azucena Morrison is a 68 y.o.female with  Patient Active Problem List    Diagnosis Date Noted    Abdominal wall  abscess 08/02/2018    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    Polyp of colon 06/04/2018    Generalized abdominal pain     LLQ pain     Diabetes with skin complication     Unspecified local infection of skin and subcutaneous tissue     Diabetes mellitus     Type 2 diabetes mellitus with skin complication     Abscess of abdominal wall 07/11/2017    Tachycardia 12/09/2016    S/P exploratory laparotomy     S/P colostomy 11/14/2016    Fistula of intestine, excluding rectum and anus 11/14/2016    Drainage from wound 08/03/2016    Essential hypertension 08/02/2016    History of hemicolectomy 05/17/2016    Status post Aiyana's procedure 05/17/2016    HTN (hypertension) 05/17/2016    Type 2 diabetes mellitus without complication 05/17/2016    Suture granuloma 05/17/2016        Plan:     1) Abdominal wounds; surgical wounds, abdominal pain,   - continue wound care and surgical management per primary team  - agree with IV zosyn for abx coverage; abdominal wounds with E coli  - pain and anti-emetic management per primary team  -Recommend ID consult     2) Hypertension  - blood pressure currently at 126/80  - continue to hold home anti-hypertensives at this time, can add on in stepwise fashion if becomes elevated     3) diabetes mellitus type II  - hold metformin while inpatient  - initiated sliding scale in insulin with q 4 accuchecks     4) microcytic anemia  - H/H 9.4/30.5 on admission, with MCV 77  - anemia panel: Fe: 87, TIBC: 332, Sat Fe: 26; Transferrin: 224, Ferritin: 97, Folate: 10, B12: 163.  -Recommend oral b12 supplementation 1000mcg daily     5) thrombocytosis  - likely reactive due to concurrent wound/infection burden  - continue to monitor     6) hyponatremia, hypotonic  - serum osmole, urine osmole, urine Na: 291, 618 and 138 respectively.  - recommend primary team reassess fluid status; patient likely does not need IVF at this time   (Na downtrending)  -recommend stopping      7) globulin gap  -  consider Hepatitis and HIV  - Hep A IgM, Hep B Igm, and surface antigen, HEP C Anitgen: all negative.     Naif Martinez, DO  [facility] Internal Medicine HO-I    [facility] Medicine Hospitalist Pager numbers:   [facility] Hospitalist Medicine Team A (Omero/Cuauhtemoc): [phone]  [facility] Hospitalist Medicine Team B (Elizabet/Peng):  [phone]

## 2018-08-04 NOTE — PROGRESS NOTES
"Surgery follow up  C/o nausea , no vomiting.  /80   Pulse 70   Temp 97.3 °F (36.3 °C)   Resp 17   Ht 4' 11" (1.499 m)   Wt 77 kg (169 lb 12.1 oz)   SpO2 96%   Breastfeeding? No   BMI 34.29 kg/m²   I/O last 3 completed shifts:  In: 3439.6 [I.V.:2939.6; IV Piggyback:500]  Out: 1550 [Urine:1550]  No intake/output data recorded.  Recent Results (from the past 336 hour(s))   CBC auto differential    Collection Time: 08/02/18  7:17 PM   Result Value Ref Range    WBC 8.68 3.90 - 12.70 K/uL    Hemoglobin 10.1 (L) 12.0 - 16.0 g/dL    Hematocrit 30.6 (L) 37.0 - 48.5 %    Platelets 570 (H) 150 - 350 K/uL   CBC auto differential    Collection Time: 08/01/18 11:18 AM   Result Value Ref Range    WBC 7.53 3.90 - 12.70 K/uL    Hemoglobin 9.4 (L) 12.0 - 16.0 g/dL    Hematocrit 30.5 (L) 37.0 - 48.5 %    Platelets 496 (H) 150 - 350 K/uL     No results found for this or any previous visit (from the past 336 hour(s)).  abdomen soft, still draining form the abdominal wound , continue present treatment.  "

## 2018-08-05 LAB
ALBUMIN SERPL BCP-MCNC: 2.6 G/DL
ALP SERPL-CCNC: 62 U/L
ALT SERPL W/O P-5'-P-CCNC: 10 U/L
ANION GAP SERPL CALC-SCNC: 7 MMOL/L
AST SERPL-CCNC: 11 U/L
BILIRUB SERPL-MCNC: 0.2 MG/DL
BUN SERPL-MCNC: 13 MG/DL
CALCIUM SERPL-MCNC: 8.6 MG/DL
CHLORIDE SERPL-SCNC: 106 MMOL/L
CO2 SERPL-SCNC: 24 MMOL/L
CREAT SERPL-MCNC: 1.1 MG/DL
EST. GFR  (AFRICAN AMERICAN): >60 ML/MIN/1.73 M^2
EST. GFR  (NON AFRICAN AMERICAN): 52 ML/MIN/1.73 M^2
GLUCOSE SERPL-MCNC: 141 MG/DL
POCT GLUCOSE: 136 MG/DL (ref 70–110)
POCT GLUCOSE: 142 MG/DL (ref 70–110)
POCT GLUCOSE: 207 MG/DL (ref 70–110)
POCT GLUCOSE: 258 MG/DL (ref 70–110)
POTASSIUM SERPL-SCNC: 4.1 MMOL/L
PROT SERPL-MCNC: 6.9 G/DL
SODIUM SERPL-SCNC: 137 MMOL/L

## 2018-08-05 PROCEDURE — 25000003 PHARM REV CODE 250: Performed by: SURGERY

## 2018-08-05 PROCEDURE — 94761 N-INVAS EAR/PLS OXIMETRY MLT: CPT

## 2018-08-05 PROCEDURE — 25000003 PHARM REV CODE 250: Performed by: INTERNAL MEDICINE

## 2018-08-05 PROCEDURE — 36415 COLL VENOUS BLD VENIPUNCTURE: CPT

## 2018-08-05 PROCEDURE — 80053 COMPREHEN METABOLIC PANEL: CPT

## 2018-08-05 PROCEDURE — 63600175 PHARM REV CODE 636 W HCPCS: Performed by: SURGERY

## 2018-08-05 PROCEDURE — 11000001 HC ACUTE MED/SURG PRIVATE ROOM

## 2018-08-05 RX ADMIN — HYDROCODONE BITARTRATE AND ACETAMINOPHEN 1 TABLET: 5; 325 TABLET ORAL at 11:08

## 2018-08-05 RX ADMIN — INSULIN ASPART 1 UNITS: 100 INJECTION, SOLUTION INTRAVENOUS; SUBCUTANEOUS at 09:08

## 2018-08-05 RX ADMIN — ONDANSETRON 4 MG: 2 INJECTION INTRAMUSCULAR; INTRAVENOUS at 02:08

## 2018-08-05 RX ADMIN — HYDROCODONE BITARTRATE AND ACETAMINOPHEN 1 TABLET: 5; 325 TABLET ORAL at 02:08

## 2018-08-05 RX ADMIN — INSULIN ASPART 3 UNITS: 100 INJECTION, SOLUTION INTRAVENOUS; SUBCUTANEOUS at 11:08

## 2018-08-05 RX ADMIN — PIPERACILLIN AND TAZOBACTAM 4.5 G: 4; .5 INJECTION, POWDER, LYOPHILIZED, FOR SOLUTION INTRAVENOUS; PARENTERAL at 11:08

## 2018-08-05 RX ADMIN — PIPERACILLIN AND TAZOBACTAM 4.5 G: 4; .5 INJECTION, POWDER, LYOPHILIZED, FOR SOLUTION INTRAVENOUS; PARENTERAL at 02:08

## 2018-08-05 RX ADMIN — PIPERACILLIN AND TAZOBACTAM 4.5 G: 4; .5 INJECTION, POWDER, LYOPHILIZED, FOR SOLUTION INTRAVENOUS; PARENTERAL at 06:08

## 2018-08-05 RX ADMIN — CYANOCOBALAMIN TAB 1000 MCG 1000 MCG: 1000 TAB at 08:08

## 2018-08-05 NOTE — PROGRESS NOTES
"Surgery follow up  /68 (Patient Position: Lying)   Pulse 74   Temp 98.3 °F (36.8 °C) (Oral)   Resp 20   Ht 4' 11" (1.499 m)   Wt 77 kg (169 lb 12.1 oz)   SpO2 98%   Breastfeeding? No   BMI 34.29 kg/m²   I/O last 3 completed shifts:  In: 3781.3 [I.V.:3281.3; IV Piggyback:500]  Out: 1200 [Urine:1200]  I/O this shift:  In: 416.7 [I.V.:416.7]  Out: -   Recent Results (from the past 336 hour(s))   CBC auto differential    Collection Time: 08/02/18  7:17 PM   Result Value Ref Range    WBC 8.68 3.90 - 12.70 K/uL    Hemoglobin 10.1 (L) 12.0 - 16.0 g/dL    Hematocrit 30.6 (L) 37.0 - 48.5 %    Platelets 570 (H) 150 - 350 K/uL   CBC auto differential    Collection Time: 08/01/18 11:18 AM   Result Value Ref Range    WBC 7.53 3.90 - 12.70 K/uL    Hemoglobin 9.4 (L) 12.0 - 16.0 g/dL    Hematocrit 30.5 (L) 37.0 - 48.5 %    Platelets 496 (H) 150 - 350 K/uL     No results found for this or any previous visit (from the past 336 hour(s)).  Abdomen soft less tender , drainage less    Plan: check BE in am.  "

## 2018-08-05 NOTE — PLAN OF CARE
Problem: Patient Care Overview  Goal: Plan of Care Review  Pts safety maintained, bed alarm on. Contact precautions continued. Fluids infusing, meds given per MAR. Wound dressed during daytime, dressing CDI. complains of headache. Given Percocet and Zofran for nausea. No other complains. BG monitored. Vitals stable through the nighty.

## 2018-08-05 NOTE — PLAN OF CARE
Problem: Patient Care Overview  Goal: Plan of Care Review  Outcome: Ongoing (interventions implemented as appropriate)  Pt is AAOx3 with intermittent complaints of pain with relief from norco PO and intermittent complaints of nausea with relief from zofran IV. Pt has been up ab sindy in room. Pt tolerating diabetic diet well. Pt given supplemental insulin for high blood sugars. Drsg to abd changed per Nurse.

## 2018-08-05 NOTE — PROGRESS NOTES
[facility] Medicine Progress Note    Primary Team: [facility] Hospitalist Team B  Attending Physician: Dr. Khoury  Resident: Aparna  Intern: Michelle    Subjective:      Patient states that she is feeling fine today. She denies melena, edematous legs, dysuria, hematuria hematochezia, chest pain, SOB, feeling confused and HA.        Objective:     Last 24 Hour Vital Signs:  BP  Min: 108/55  Max: 145/75  Temp  Av.8 °F (36.6 °C)  Min: 96.6 °F (35.9 °C)  Max: 98.5 °F (36.9 °C)  Pulse  Av.2  Min: 67  Max: 75  Resp  Av.8  Min: 17  Max: 18  SpO2  Av %  Min: 96 %  Max: 99 %  I/O last 3 completed shifts:  In: 4000 [I.V.:3500; IV Piggyback:500]  Out: 950 [Urine:950]    Physical Examination:  General: Patient sitting up in bed in NAD  CV: RRR with no rubs murmurs or s3 or s4  Resp: CTAB with no wheezes crackles or rhonchi  Abdomen: Soft, currently nontender. Bandage covering lower abdomen no sign of oozing or leakage.    Laboratory:  Laboratory Data Reviewed: yes  Pertinent Findings:    Fe: 87, TIBC: 332, Sat Fe: 26; Transferrin: 224, Ferritin: 97, Folate: 10, B12: 163  CMP Pending    Microbiology Data Reviewed: yes  Pertinent Findings:  Culture from  grew Gram - Rods, non-lactose   New Cultures show NGD      Radiology Data Reviewed: yes  Pertinent Findings:  No new Radiology studies.    Current Medications:     Infusions:   sodium chloride 0.9% 125 mL/hr at 18        Scheduled:   cyanocobalamin  1,000 mcg Oral Daily    piperacillin-tazobactam (ZOSYN) IVPB  4.5 g Intravenous Q8H    scopolamine  1 patch Transdermal Q3 Days        PRN:  dextrose 50%, dextrose 50%, glucagon (human recombinant), glucose, glucose, HYDROcodone-acetaminophen, insulin aspart U-100, ondansetron    Antibiotics and Day Number of Therapy:  Zosyn on     Lines and Day Number of Therapy:  Peripheral IV: R antecubital: 2 days    Assessment:     Azucena Morrison is a 68 y.o.female with  Patient Active Problem List     Diagnosis Date Noted    Abdominal wall abscess 08/02/2018    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    Polyp of colon 06/04/2018    Generalized abdominal pain     LLQ pain     Diabetes with skin complication     Unspecified local infection of skin and subcutaneous tissue     Diabetes mellitus     Type 2 diabetes mellitus with skin complication     Abscess of abdominal wall 07/11/2017    Tachycardia 12/09/2016    S/P exploratory laparotomy     S/P colostomy 11/14/2016    Fistula of intestine, excluding rectum and anus 11/14/2016    Drainage from wound 08/03/2016    Essential hypertension 08/02/2016    History of hemicolectomy 05/17/2016    Status post Aiyana's procedure 05/17/2016    HTN (hypertension) 05/17/2016    Type 2 diabetes mellitus without complication 05/17/2016    Suture granuloma 05/17/2016        Plan:     1) Abdominal wounds; surgical wounds, abdominal pain,   - continue wound care and surgical management per primary team  - agree with IV zosyn for abx coverage; abdominal wounds with ESBL E coli on  continue  - pain and anti-emetic management per primary team     2) Hypertension  - blood pressure currently at 108/55; has been mainly normotensive throughout stay  - continue to hold home anti-hypertensives at this time, can add on in stepwise fashion if becomes elevated     3) diabetes mellitus type II  - hold metformin while inpatient  - initiated sliding scale in insulin with q 4 accuchecks  -Glucose currently 136; received one unit of aspart on 8/3     4) microcytic anemia  - H/H 9.4/30.5 on admission, with MCV 77   - anemia panel: Fe: 87, TIBC: 332, Sat Fe: 26; Transferrin: 224, Ferritin: 97, Folate: 10, B12: 163.  - Started on B12 1000mcg daily on 8/4     5) thrombocytosis  - likely reactive due to concurrent wound/infection burden  - continue to monitor     6) hyponatremia, hypotonic  - serum osmole, urine osmole, urine Na: 291, 618 and 138 respectively.  - recommend primary team  reassess fluid status; patient likely does not need IVF at this time   (Na downtrending)  -previously recommended stopping; now stopped.   -CMP Pending      7) globulin gap  - consider HIV  - Hep A IgM, Hep B Igm, and surface antigen, HEP C Anitgen: all negative.     Naif Martinez DO  [facility] Internal Medicine HO-I    [facility] Medicine Hospitalist Pager numbers:   [facility] Hospitalist Medicine Team A (Omero/Cuauhtemoc): [phone]  [facility] Hospitalist Medicine Team B (Elizabet/Peng):  [phone]

## 2018-08-06 LAB
BACTERIA SPEC AEROBE CULT: NORMAL
BACTERIA SPEC AEROBE CULT: NORMAL
POCT GLUCOSE: 101 MG/DL (ref 70–110)
POCT GLUCOSE: 136 MG/DL (ref 70–110)
POCT GLUCOSE: 197 MG/DL (ref 70–110)
POCT GLUCOSE: 223 MG/DL (ref 70–110)

## 2018-08-06 PROCEDURE — 25000003 PHARM REV CODE 250: Performed by: SURGERY

## 2018-08-06 PROCEDURE — 11000001 HC ACUTE MED/SURG PRIVATE ROOM

## 2018-08-06 PROCEDURE — 25000003 PHARM REV CODE 250: Performed by: STUDENT IN AN ORGANIZED HEALTH CARE EDUCATION/TRAINING PROGRAM

## 2018-08-06 PROCEDURE — 25000003 PHARM REV CODE 250: Performed by: INTERNAL MEDICINE

## 2018-08-06 PROCEDURE — 25500020 PHARM REV CODE 255: Performed by: SURGERY

## 2018-08-06 PROCEDURE — 94761 N-INVAS EAR/PLS OXIMETRY MLT: CPT

## 2018-08-06 PROCEDURE — 63600175 PHARM REV CODE 636 W HCPCS: Performed by: SURGERY

## 2018-08-06 RX ORDER — LOSARTAN POTASSIUM 50 MG/1
100 TABLET ORAL DAILY
Status: DISCONTINUED | OUTPATIENT
Start: 2018-08-06 | End: 2018-08-18 | Stop reason: HOSPADM

## 2018-08-06 RX ADMIN — PIPERACILLIN AND TAZOBACTAM 4.5 G: 4; .5 INJECTION, POWDER, LYOPHILIZED, FOR SOLUTION INTRAVENOUS; PARENTERAL at 11:08

## 2018-08-06 RX ADMIN — HYDROCODONE BITARTRATE AND ACETAMINOPHEN 1 TABLET: 5; 325 TABLET ORAL at 09:08

## 2018-08-06 RX ADMIN — PIPERACILLIN AND TAZOBACTAM 4.5 G: 4; .5 INJECTION, POWDER, LYOPHILIZED, FOR SOLUTION INTRAVENOUS; PARENTERAL at 03:08

## 2018-08-06 RX ADMIN — ONDANSETRON 4 MG: 2 INJECTION INTRAMUSCULAR; INTRAVENOUS at 08:08

## 2018-08-06 RX ADMIN — SCOPOLAMINE 1 PATCH: 1 PATCH, EXTENDED RELEASE TRANSDERMAL at 08:08

## 2018-08-06 RX ADMIN — PIPERACILLIN AND TAZOBACTAM 4.5 G: 4; .5 INJECTION, POWDER, LYOPHILIZED, FOR SOLUTION INTRAVENOUS; PARENTERAL at 07:08

## 2018-08-06 RX ADMIN — HYDROCODONE BITARTRATE AND ACETAMINOPHEN 1 TABLET: 5; 325 TABLET ORAL at 08:08

## 2018-08-06 RX ADMIN — HYDROCODONE BITARTRATE AND ACETAMINOPHEN 1 TABLET: 5; 325 TABLET ORAL at 01:08

## 2018-08-06 RX ADMIN — CYANOCOBALAMIN TAB 1000 MCG 1000 MCG: 1000 TAB at 08:08

## 2018-08-06 RX ADMIN — LOSARTAN POTASSIUM 100 MG: 50 TABLET, FILM COATED ORAL at 12:08

## 2018-08-06 RX ADMIN — DIATRIZOATE MEGLUMINE AND DIATRIZOATE SODIUM 720 ML: 660; 100 LIQUID ORAL; RECTAL at 10:08

## 2018-08-06 NOTE — PLAN OF CARE
Problem: Patient Care Overview  Goal: Plan of Care Review  Pts safety maintained. Contact precautions continued. Meds given per MAR. Wound dressed during daytime, dressing CDI.  No complains of N/V, SOB, distress. Pain relieved with Oxycodone. BG monitored and supplemental insuline given. Vitals stable through the nighty.

## 2018-08-06 NOTE — PLAN OF CARE
Problem: Patient Care Overview  Goal: Plan of Care Review  Outcome: Ongoing (interventions implemented as appropriate)  Pt awake and alert. Contact precautions maintained per order. Language line used for all communication with patient; [place] speaking patient. for barium enema study today. Abdominal dressing changed per orders. Sliding scale insulin orders without coverage needed per sliding scale. Zosyn running per orders. Up with assist to bathroom. Safety maintained.

## 2018-08-06 NOTE — SUBJECTIVE & OBJECTIVE
Past Medical History:   Diagnosis Date    Diabetes mellitus     Diabetes mellitus, type 2     Hypertension     Kidney stone        Past Surgical History:   Procedure Laterality Date     SECTION, CLASSIC      CHOLECYSTECTOMY      [facility]    COLONOSCOPY N/A 2018    Procedure: COLONOSCOPY;  Surgeon: Gibran Aguayo MD;  Location: [facility];  Service: Endoscopy;  Laterality: N/A;    COLOSTOMY Left     INCISIONAL HERNIA REPAIR Right        Review of patient's allergies indicates:   Allergen Reactions    Chlorhexidine gluconate Rash     Chlorhexidine/alcohol wipes. Rash and blistering.       Medications:  Prescriptions Prior to Admission   Medication Sig    acetaminophen (TYLENOL) 325 MG tablet Take 2 tablets (650 mg total) by mouth every 8 (eight) hours as needed.    hydroCHLOROthiazide (HYDRODIURIL) 25 MG tablet     HYDROcodone-acetaminophen (NORCO) 5-325 mg per tablet Take 1 tablet by mouth every 6 (six) hours as needed.    LANTUS SOLOSTAR U-100 INSULIN 100 unit/mL (3 mL) InPn pen     losartan (COZAAR) 100 MG tablet Take 100 mg by mouth once daily.     metFORMIN (GLUCOPHAGE) 1000 MG tablet     metformin (GLUCOPHAGE) 500 MG tablet Take 1,000 mg by mouth 2 (two) times daily with meals.    ondansetron (ZOFRAN) 4 MG tablet Take 2 tablets (8 mg total) by mouth every 8 (eight) hours as needed for Nausea.    ondansetron (ZOFRAN) 8 MG tablet     ondansetron (ZOFRAN) 8 MG tablet Take 1 tablet (8 mg total) by mouth every 8 (eight) hours as needed for Nausea.    sulfamethoxazole-trimethoprim 800-160mg (BACTRIM DS) 800-160 mg Tab Take 1 tablet by mouth 2 (two) times daily.    TRUEPLUS LANCETS 26 gauge Misc     TRUETEST TEST STRIPS Strp     mupirocin (BACTROBAN) 2 % ointment     promethazine (PHENERGAN) 6.25 mg/5 mL syrup     traMADol (ULTRAM) 50 mg tablet TAKE 1 TABLET BY MOUTH EVERY 6 HOURS     Antibiotics     Start     Stop Route Frequency Ordered     08/02/18 2330  piperacillin-tazobactam 4.5 g in dextrose 5 % 100 mL IVPB (ready to mix system)      -- IV Every 8 hours (non-standard times) 08/02/18 2215        Antifungals     None        Antivirals     None           Immunization History   Administered Date(s) Administered    Pneumococcal Conjugate - 13 Valent 08/06/2016       Family History     None        Social History     Social History    Marital status: Single     Spouse name: N/A    Number of children: N/A    Years of education: N/A     Social History Main Topics    Smoking status: Never Smoker    Smokeless tobacco: Never Used    Alcohol use No    Drug use: No    Sexual activity: Not Currently     Other Topics Concern    None     Social History Narrative    None     Review of Systems   Constitutional: Negative.    HENT: Negative for congestion, dental problem, ear discharge and facial swelling.    Eyes: Negative for pain, discharge and itching.   Respiratory: Negative for apnea, choking and chest tightness.    Cardiovascular: Negative for chest pain and palpitations.   Gastrointestinal: Positive for abdominal pain. Negative for constipation, diarrhea, nausea and vomiting.   Genitourinary: Negative for difficulty urinating and dyspareunia.   Musculoskeletal: Negative for arthralgias, gait problem and myalgias.   Skin: Positive for wound. Negative for color change and pallor.        Skin with abdominal wound   Neurological: Negative for dizziness, facial asymmetry and headaches.   Psychiatric/Behavioral: Negative for agitation, behavioral problems and confusion.   All other systems reviewed and are negative.    Objective:     Vital Signs (Most Recent):  Temp: 98.3 °F (36.8 °C) (08/06/18 1228)  Pulse: 76 (08/06/18 1228)  Resp: 18 (08/06/18 0822)  BP: (!) 151/84 (08/06/18 1228)  SpO2: 97 % (08/06/18 0816) Vital Signs (24h Range):  Temp:  [98 °F (36.7 °C)-98.9 °F (37.2 °C)] 98.3 °F (36.8 °C)  Pulse:  [69-80] 76  Resp:  [18] 18  SpO2:  [95 %-98 %] 97  %  BP: (141-161)/(75-84) 151/84     Weight: 77 kg (169 lb 12.1 oz)  Body mass index is 34.29 kg/m².    Estimated Creatinine Clearance: 60.3 mL/min (based on SCr of 1.1 mg/dL).    Physical Exam   Constitutional: She is oriented to person, place, and time. She appears well-developed and well-nourished.   HENT:   Head: Normocephalic and atraumatic.   Eyes: EOM are normal. Pupils are equal, round, and reactive to light.   Neck: Normal range of motion. Neck supple. No JVD present. No tracheal deviation present.   Cardiovascular: Normal rate, regular rhythm, normal heart sounds and intact distal pulses.  Exam reveals no friction rub.    No murmur heard.  Pulmonary/Chest: Effort normal and breath sounds normal. No respiratory distress. She has no wheezes. She has no rales.   Abdominal: Soft. Bowel sounds are normal. She exhibits no distension. There is tenderness.   + wound on abdomen with serosangious discharge 2x2 cm   Musculoskeletal: Normal range of motion. She exhibits no edema, tenderness or deformity.   Lymphadenopathy:     She has no cervical adenopathy.   Neurological: She is alert and oriented to person, place, and time. No cranial nerve deficit.   Skin: Skin is warm.   + wound please see abdominal exam   Psychiatric: She has a normal mood and affect. Her behavior is normal. Thought content normal.   Nursing note and vitals reviewed.      Significant Labs:   Blood Culture:   Recent Labs  Lab 08/02/18 1918 08/02/18 1948   LABBLOO No Growth to date  No Growth to date  No Growth to date  No Growth to date No Growth to date  No Growth to date  No Growth to date  No Growth to date     BMP:   Recent Labs  Lab 08/05/18  0723   *      K 4.1      CO2 24   BUN 13   CREATININE 1.1   CALCIUM 8.6*     CBC: No results for input(s): WBC, HGB, HCT, PLT in the last 48 hours.  Wound Culture:   Recent Labs  Lab 02/28/18  0910 04/18/18  0920 07/11/18  0930 08/02/18  1918   LABAERO STAPHYLOCOCCUS  AUREUSModerate  ESCHERICHIA COLIFew ESCHERICHIA COLIMany ESCHERICHIA COLIModerate  ESCHERICHIA COLIFew ESCHERICHIA COLIMany  KLEBSIELLA OXYTOCA ESBLModerate     All pertinent labs within the past 24 hours have been reviewed.    Significant Imaging: I have reviewed all pertinent imaging results/findings within the past 24 hours. Noted results of CT scan of abdomen on 8/2/108 with possible abscess and air fluid levels with intestine tethering the ant abdominal wall

## 2018-08-06 NOTE — PLAN OF CARE
Progress notes reviewed. Treatment to continue. ID consulted. TN to follow up.     Hx:  Patient attends wound care with Dr Horton for abdominal wound and is current with [facility].       Future Appointments  Date Time Provider Department Center   8/8/2018 8:30 AM Ulisses Horton MD [facility] WOUND [facility]           08/06/18 142   Discharge Reassessment   Assessment Type Discharge Planning Reassessment   Provided patient/caregiver education on the expected discharge date and the discharge plan Yes   Do you have any problems affording any of your prescribed medications? No   Discharge Plan A Home with family;Home Health   Discharge Plan B Home with family;Home Health   Can the patient answer the patient profile reliably? Yes, cognitively intact   How does the patient rate their overall health at the present time? Good   Describe the patient's ability to walk at the present time. Minor restrictions or changes   How often would a person be available to care for the patient? Whenever needed   Number of comorbid conditions (as recorded on the chart) Three   During the past month, has the patient often been bothered by feeling down, depressed or hopeless? No   During the past month, has the patient often been bothered by little interest or pleasure in doing things? No

## 2018-08-06 NOTE — ASSESSMENT & PLAN NOTE
----Wound culture from 8/2/2018 revealing Ecoli and Klebs oxycota-ESBL  ---- On Pip/tazo since 8/2 to 8/6    Recommendations:  ---- Continue contact precautions  ---- Will recommend to find source if either this is a fistula or an abdominal abscess or a ventral hernia with bowel   ---- May need a fistulogram

## 2018-08-06 NOTE — CONSULTS
[facility]  Infectious Disease  Consult Note    Patient Name: Azucena Morrison  MRN: [id number]  Admission Date: 8/2/2018  Hospital Length of Stay: 4 days  Attending Physician: Ulisses Horton MD  Primary Care Provider: Pj Sanches MD     Isolation Status: Contact    Patient information was obtained from patient, past medical records and ER records.      Inpatient consult to Infectious Diseases  Consult performed by: VINCENZO PEREYRA  Consult ordered by: MIRA COREAS  Reason for consult: Choice of antibiotics for positive for wound culture 8/2/2018        Assessment/Plan:     Drainage from wound    ----Wound culture from 8/2/2018 revealing Ecoli and Klebs oxycota-ESBL  ---- On Pip/tazo since 8/2 to 8/6    Recommendations:  ---- Continue contact precautions  ---- Will recommend to find source if either this is a fistula or an abdominal abscess or a ventral hernia with bowel   ---- May need a fistulogram            Thank you for your consult. I will follow-up with patient. Please contact us if you have any additional questions.    Vincenzo Pereyra MD   [facility] Infectious Disease Fellow, PGY4  ID pager [phone]  Infectious Disease  [facility]    Subjective:     Principal Problem: Abdominal wall abscess    HPI: This is a very pleasant 69 YO F from [place] who has a PMH of HTN, DM, kidney stones, recurrent diverticulitis that led to a 80% hemicolectomy in 2015. According to the patient she tells me that she was operated in later in [facility] and had a colostomy then. Later was seen bu Dr Alfaro and had a reversal of colostomy and closure in 11/2016 and other compliacted surgerires for fistula and incarcerated ventral hernia. According to the patient she had total 5 surgeries after the first surgery in [place].  According to the patient she had no abdominal wound infection till now but she did mention that she took possibly augmentin and bactrim for one year before this.  As  note after reviewing records the wound culture from 2018 was growing Ecoli and according to her home meds she was prescribed bactrim on 2018. Not sure if she took that or not.  This last Friday she started having 2-3 intensity dull abdominal discomfort with severe N/V and abdominal wound serosangious discharge. Denied any chills, no fevers and she was admitted to [facility] on 2018.  She has been on Pip/Tazo since 2018 and wound cx collected on 2018 is revealing Ecoli and ESBL Klebsiella now. ID consult called 2018 for choice of antibiotics.    Past Medical History:   Diagnosis Date    Diabetes mellitus     Diabetes mellitus, type 2     Hypertension     Kidney stone        Past Surgical History:   Procedure Laterality Date     SECTION, CLASSIC      CHOLECYSTECTOMY      COLON SURGERY      [facility]    COLONOSCOPY N/A 2018    Procedure: COLONOSCOPY;  Surgeon: Gibran Aguayo MD;  Location: [facility];  Service: Endoscopy;  Laterality: N/A;    COLOSTOMY Left     INCISIONAL HERNIA REPAIR Right        Review of patient's allergies indicates:   Allergen Reactions    Chlorhexidine gluconate Rash     Chlorhexidine/alcohol wipes. Rash and blistering.       Medications:  Prescriptions Prior to Admission   Medication Sig    acetaminophen (TYLENOL) 325 MG tablet Take 2 tablets (650 mg total) by mouth every 8 (eight) hours as needed.    hydroCHLOROthiazide (HYDRODIURIL) 25 MG tablet     HYDROcodone-acetaminophen (NORCO) 5-325 mg per tablet Take 1 tablet by mouth every 6 (six) hours as needed.    LANTUS SOLOSTAR U-100 INSULIN 100 unit/mL (3 mL) InPn pen     losartan (COZAAR) 100 MG tablet Take 100 mg by mouth once daily.     metFORMIN (GLUCOPHAGE) 1000 MG tablet     metformin (GLUCOPHAGE) 500 MG tablet Take 1,000 mg by mouth 2 (two) times daily with meals.    ondansetron (ZOFRAN) 4 MG tablet Take 2 tablets (8 mg total) by mouth every 8 (eight) hours as needed for  Nausea.    ondansetron (ZOFRAN) 8 MG tablet     ondansetron (ZOFRAN) 8 MG tablet Take 1 tablet (8 mg total) by mouth every 8 (eight) hours as needed for Nausea.    sulfamethoxazole-trimethoprim 800-160mg (BACTRIM DS) 800-160 mg Tab Take 1 tablet by mouth 2 (two) times daily.    TRUEPLUS LANCETS 26 gauge Misc     TRUETEST TEST STRIPS Strp     mupirocin (BACTROBAN) 2 % ointment     promethazine (PHENERGAN) 6.25 mg/5 mL syrup     traMADol (ULTRAM) 50 mg tablet TAKE 1 TABLET BY MOUTH EVERY 6 HOURS     Antibiotics     Start     Stop Route Frequency Ordered    08/02/18 2330  piperacillin-tazobactam 4.5 g in dextrose 5 % 100 mL IVPB (ready to mix system)      -- IV Every 8 hours (non-standard times) 08/02/18 2216        Antifungals     None        Antivirals     None           Immunization History   Administered Date(s) Administered    Pneumococcal Conjugate - 13 Valent 08/06/2016       Family History     None        Social History     Social History    Marital status: Single     Spouse name: N/A    Number of children: N/A    Years of education: N/A     Social History Main Topics    Smoking status: Never Smoker    Smokeless tobacco: Never Used    Alcohol use No    Drug use: No    Sexual activity: Not Currently     Other Topics Concern    None     Social History Narrative    None     Review of Systems   Constitutional: Negative.    HENT: Negative for congestion, dental problem, ear discharge and facial swelling.    Eyes: Negative for pain, discharge and itching.   Respiratory: Negative for apnea, choking and chest tightness.    Cardiovascular: Negative for chest pain and palpitations.   Gastrointestinal: Positive for abdominal pain. Negative for constipation, diarrhea, nausea and vomiting.   Genitourinary: Negative for difficulty urinating and dyspareunia.   Musculoskeletal: Negative for arthralgias, gait problem and myalgias.   Skin: Positive for wound. Negative for color change and pallor.        Skin  with abdominal wound   Neurological: Negative for dizziness, facial asymmetry and headaches.   Psychiatric/Behavioral: Negative for agitation, behavioral problems and confusion.   All other systems reviewed and are negative.    Objective:     Vital Signs (Most Recent):  Temp: 98.3 °F (36.8 °C) (08/06/18 1228)  Pulse: 76 (08/06/18 1228)  Resp: 18 (08/06/18 0822)  BP: (!) 151/84 (08/06/18 1228)  SpO2: 97 % (08/06/18 0816) Vital Signs (24h Range):  Temp:  [98 °F (36.7 °C)-98.9 °F (37.2 °C)] 98.3 °F (36.8 °C)  Pulse:  [69-80] 76  Resp:  [18] 18  SpO2:  [95 %-98 %] 97 %  BP: (141-161)/(75-84) 151/84     Weight: 77 kg (169 lb 12.1 oz)  Body mass index is 34.29 kg/m².    Estimated Creatinine Clearance: 60.3 mL/min (based on SCr of 1.1 mg/dL).    Physical Exam   Constitutional: She is oriented to person, place, and time. She appears well-developed and well-nourished.   HENT:   Head: Normocephalic and atraumatic.   Eyes: EOM are normal. Pupils are equal, round, and reactive to light.   Neck: Normal range of motion. Neck supple. No JVD present. No tracheal deviation present.   Cardiovascular: Normal rate, regular rhythm, normal heart sounds and intact distal pulses.  Exam reveals no friction rub.    No murmur heard.  Pulmonary/Chest: Effort normal and breath sounds normal. No respiratory distress. She has no wheezes. She has no rales.   Abdominal: Soft. Bowel sounds are normal. She exhibits no distension. There is tenderness.   + wound on abdomen with serosangious discharge 2x2 cm   Musculoskeletal: Normal range of motion. She exhibits no edema, tenderness or deformity.   Lymphadenopathy:     She has no cervical adenopathy.   Neurological: She is alert and oriented to person, place, and time. No cranial nerve deficit.   Skin: Skin is warm.   + wound please see abdominal exam   Psychiatric: She has a normal mood and affect. Her behavior is normal. Thought content normal.   Nursing note and vitals reviewed.      Significant  Labs:   Blood Culture:   Recent Labs  Lab 08/02/18 1918 08/02/18  1948   LABBLOO No Growth to date  No Growth to date  No Growth to date  No Growth to date No Growth to date  No Growth to date  No Growth to date  No Growth to date     BMP:   Recent Labs  Lab 08/05/18  0723   *      K 4.1      CO2 24   BUN 13   CREATININE 1.1   CALCIUM 8.6*     CBC: No results for input(s): WBC, HGB, HCT, PLT in the last 48 hours.  Wound Culture:   Recent Labs  Lab 02/28/18  0910 04/18/18  0920 07/11/18  0930 08/02/18 1918   LABAERO STAPHYLOCOCCUS AUREUSModerate  ESCHERICHIA COLIFew ESCHERICHIA COLIMany ESCHERICHIA COLIModerate  ESCHERICHIA COLIFew ESCHERICHIA COLIMany  KLEBSIELLA OXYTOCA ESBLModerate     All pertinent labs within the past 24 hours have been reviewed.    Significant Imaging: I have reviewed all pertinent imaging results/findings within the past 24 hours. Noted results of CT scan of abdomen on 8/2/108 with possible abscess and air fluid levels with intestine tethering the ant abdominal wall

## 2018-08-06 NOTE — PROGRESS NOTES
\[facility]\"" Hospital Medicine Progress Note    Primary Team: \[facility]\"" Hospitalist Team B  Attending Physician: Dr. Khoury  Resident: Ranjit Braun  Intern: Naseem Martinez    Subjective:      Patient awake and alert this morning. Endorses 5/10 abdominal pain located umbilicus area. Dressing clean, dry, and intact. No surrounding erythema or drainage. Endorse HA without vision changes.     Denies CP, SOB. Stooling and voiding appropriately.      Objective:     Last 24 Hour Vital Signs:  BP  Min: 134/68  Max: 161/77  Temp  Av.5 °F (36.9 °C)  Min: 98 °F (36.7 °C)  Max: 98.9 °F (37.2 °C)  Pulse  Av.2  Min: 69  Max: 80  Resp  Av.7  Min: 18  Max: 20  SpO2  Av %  Min: 95 %  Max: 98 %  I/O last 3 completed shifts:  In: 2122.9 [I.V.:1822.9; IV Piggyback:300]  Out: 3425 [Urine:3425]    Physical Examination:  General: Patient sitting up in bed in NAD  CV: RRR with no rubs murmurs or s3 or s4  Resp: CTAB with no wheezes crackles or rhonchi  Abdomen: Soft, tender to palpation at umbilical area. Dressing covering lower abdomen no sign of oozing or leakage, c/d/i  Extremities- SCDs and bobbi hose in place, distal pulses equal and intact  Neuro: aax3    Laboratory:  Laboratory Data Reviewed: yes  Pertinent Findings:  POCT 136  Fe: 87, TIBC: 332, Sat Fe: 26; Transferrin: 224, Ferritin: 97, Folate: 10, B12: 163    Microbiology Data Reviewed: yes  Pertinent Findings:  Culture from  grew EColi and Klebsiella   New Cultures show NG      Radiology Data Reviewed: yes  Pertinent Findings:  No new Radiology studies.    Current Medications:     Infusions:       Scheduled:   cyanocobalamin  1,000 mcg Oral Daily    piperacillin-tazobactam (ZOSYN) IVPB  4.5 g Intravenous Q8H    scopolamine  1 patch Transdermal Q3 Days        PRN:  dextrose 50%, dextrose 50%, glucagon (human recombinant), glucose, glucose, HYDROcodone-acetaminophen, insulin aspart U-100, ondansetron    Antibiotics and Day Number of Therapy:  Zosyn on     Lines  and Day Number of Therapy:  Peripheral IV: R antecubital: 3 days    Assessment:     Azucena Morrison is a 68 y.o.female with  Patient Active Problem List    Diagnosis Date Noted    Abdominal wall abscess 08/02/2018    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    Polyp of colon 06/04/2018    Generalized abdominal pain     LLQ pain     Diabetes with skin complication     Unspecified local infection of skin and subcutaneous tissue     Diabetes mellitus     Type 2 diabetes mellitus with skin complication     Abscess of abdominal wall 07/11/2017    Tachycardia 12/09/2016    S/P exploratory laparotomy     S/P colostomy 11/14/2016    Fistula of intestine, excluding rectum and anus 11/14/2016    Drainage from wound 08/03/2016    Essential hypertension 08/02/2016    History of hemicolectomy 05/17/2016    Status post Aiyana's procedure 05/17/2016    HTN (hypertension) 05/17/2016    Type 2 diabetes mellitus without complication 05/17/2016    Suture granuloma 05/17/2016        Plan:     1) Abdominal wounds; surgical wounds, abdominal pain  - continue wound care and surgical management per primary team  - agree with IV zosyn for abx coverage; abdominal wounds with ESBL E coli, Klebsiella  - pain and anti-emetic management per primary team     2) Hypertension  - blood pressure currently at 161/77; has been mainly normotensive throughout stay  - continue to hold home anti-hypertensives at this time, can add on in stepwise fashion if becomes elevated     3) diabetes mellitus type II  - hold metformin while inpatient  - initiated sliding scale in insulin with q 4 accuchecks  -Glucose currently 136; received one unit of aspart on 8/3     4) microcytic anemia  - H/H 9.4/30.5 on admission, with MCV 77   - anemia panel: Fe: 87, TIBC: 332, Sat Fe: 26; Transferrin: 224, Ferritin: 97, Folate: 10, B12: 163.  - Started on B12 1000mcg daily on 8/4     5) thrombocytosis  - likely reactive due to concurrent wound/infection burden  -  continue to monitor     6) hyponatremia, hypotonic  - serum osmole, urine osmole, urine Na: 291, 618 and 138 respectively.  - recommend primary team reassess fluid status; patient likely does not need IVF at this time -> Na downtrending -> now resolved  - IVF previously recommended stopping; now stopped.        7) globulin gap  - consider HIV  - Hep A IgM, Hep B Igm, and surface antigen, HEP C Anitgen: all negative.     Nolvia Gusman MD  [facility] Internal Medicine HO-II    [facility] Medicine Hospitalist Pager numbers:   [facility] Hospitalist Medicine Team A (Omero/Cuauhtemoc): [phone]  [facility] Hospitalist Medicine Team B (Elizabet/Peng):  [phone]

## 2018-08-06 NOTE — HPI
This is a very pleasant 67 YO F from [place] [employer] who has a PMH of HTN, DM, kidney stones, recurrent diverticulitis that led to a 80% hemicolectomy in 2015. According to the patient she tells me that she was operated in later in [facility] and had a colostomy then. Later was seen bu Dr Alfaro and had a reversal of colostomy and closure in 11/2016 and other compliacted surgerires for fistula and incarcerated ventral hernia. According to the patient she had total 5 surgeries after the first surgery in [employer].  According to the patient she had no abdominal wound infection till now but she did mention that she took possibly augmentin and bactrim for one year before this.  As note after reviewing records the wound culture from 7/11/2018 was growing Ecoli and according to her home meds she was prescribed bactrim on 7/18/2018. Not sure if she took that or not.  This last Friday she started having 2-3 intensity dull abdominal discomfort with severe N/V and abdominal wound serosangious discharge. Denied any chills, no fevers and she was admitted to [facility] on 8/2/2018.  She has been on Pip/Tazo since 8/2/2018 and wound cx collected on 8/2/2018 is revealing Ecoli and ESBL Klebsiella now. ID consult called 8/6/2018 for choice of antibiotics.    Patient taken to OR on 8/10 for I + D of abdominal wound - cultures pending

## 2018-08-07 LAB
POCT GLUCOSE: 145 MG/DL (ref 70–110)
POCT GLUCOSE: 150 MG/DL (ref 70–110)
POCT GLUCOSE: 176 MG/DL (ref 70–110)
POCT GLUCOSE: 239 MG/DL (ref 70–110)

## 2018-08-07 PROCEDURE — 25000003 PHARM REV CODE 250: Performed by: INTERNAL MEDICINE

## 2018-08-07 PROCEDURE — 94761 N-INVAS EAR/PLS OXIMETRY MLT: CPT

## 2018-08-07 PROCEDURE — 11000001 HC ACUTE MED/SURG PRIVATE ROOM

## 2018-08-07 PROCEDURE — 25000003 PHARM REV CODE 250: Performed by: STUDENT IN AN ORGANIZED HEALTH CARE EDUCATION/TRAINING PROGRAM

## 2018-08-07 PROCEDURE — 25000003 PHARM REV CODE 250: Performed by: SURGERY

## 2018-08-07 PROCEDURE — 63600175 PHARM REV CODE 636 W HCPCS: Performed by: SURGERY

## 2018-08-07 RX ORDER — SIMETHICONE 125 MG
125 TABLET,CHEWABLE ORAL EVERY 6 HOURS PRN
Status: DISCONTINUED | OUTPATIENT
Start: 2018-08-07 | End: 2018-08-18 | Stop reason: HOSPADM

## 2018-08-07 RX ADMIN — CYANOCOBALAMIN TAB 1000 MCG 1000 MCG: 1000 TAB at 08:08

## 2018-08-07 RX ADMIN — PIPERACILLIN AND TAZOBACTAM 4.5 G: 4; .5 INJECTION, POWDER, LYOPHILIZED, FOR SOLUTION INTRAVENOUS; PARENTERAL at 04:08

## 2018-08-07 RX ADMIN — LOSARTAN POTASSIUM 100 MG: 50 TABLET, FILM COATED ORAL at 08:08

## 2018-08-07 RX ADMIN — HYDROCODONE BITARTRATE AND ACETAMINOPHEN 1 TABLET: 5; 325 TABLET ORAL at 06:08

## 2018-08-07 RX ADMIN — HYDROCODONE BITARTRATE AND ACETAMINOPHEN 1 TABLET: 5; 325 TABLET ORAL at 01:08

## 2018-08-07 RX ADMIN — PIPERACILLIN AND TAZOBACTAM 4.5 G: 4; .5 INJECTION, POWDER, LYOPHILIZED, FOR SOLUTION INTRAVENOUS; PARENTERAL at 06:08

## 2018-08-07 RX ADMIN — INSULIN ASPART 2 UNITS: 100 INJECTION, SOLUTION INTRAVENOUS; SUBCUTANEOUS at 11:08

## 2018-08-07 RX ADMIN — Medication 125 MG: at 07:08

## 2018-08-07 NOTE — PLAN OF CARE
Problem: Patient Care Overview  Goal: Plan of Care Review  AAOX3 PLAN OF CARE REVIEWED W/ PT. RACSL W/ DSG CDI . IVABX CONT. LOWER ABD WOUND DRAINING PURULENT DRAINAGE 4X4 GAUZE AND ABD DSG APPLIED. TEDS SCDS BILATERALLY . MINIMAL ASSIST REQUIRED W/ AMBULATION . GLUCOSE MONITORING W/ SS COVERAGE . CONTACT PRECAUTIONS MAINTAINED . CALL LIGHT IN REACH.

## 2018-08-07 NOTE — PROGRESS NOTES
[facility] Medicine Progress Note    Primary Team: [facility] Hospitalist Team B  Attending Physician: Dr. Khoury  Resident: Ranjit Braun  Intern: Naseem Martinez    Subjective:      Patient awake and alert this morning. Continues to endorse 5/10 abdominal pain located umbilicus area. Dressing clean, dry, and intact. No surrounding erythema or drainage.     Denies CP, SOB. Stooling and voiding appropriately. Tolerating PO without N.V.      Objective:     Last 24 Hour Vital Signs:  BP  Min: 130/77  Max: 158/84  Temp  Av.7 °F (37.1 °C)  Min: 98.1 °F (36.7 °C)  Max: 99.7 °F (37.6 °C)  Pulse  Av.3  Min: 75  Max: 88  Resp  Av.5  Min: 16  Max: 18  SpO2  Av.3 %  Min: 95 %  Max: 98 %  Weight  Av.9 kg (167 lb 5.3 oz)  Min: 75.9 kg (167 lb 5.3 oz)  Max: 75.9 kg (167 lb 5.3 oz)  I/O last 3 completed shifts:  In: 500 [P.O.:200; IV Piggyback:300]  Out: 2450 [Urine:2450]    Physical Examination:  General: Patient sitting up in bed in NAD  CV: RRR with no rubs murmurs or s3 or s4  Resp: CTAB with no wheezes crackles or rhonchi  Abdomen: Soft, NT, ND. Dressing covering lower abdomen no sign of oozing or leakage, c/d/i  Extremities- SCDs and bobbi hose in place, distal pulses equal and intact  Neuro: aax3    Laboratory:  Laboratory Data Reviewed: yes  Pertinent Findings:  POCT 136  Fe: 87, TIBC: 332, Sat Fe: 26; Transferrin: 224, Ferritin: 97, Folate: 10, B12: 163    Microbiology Data Reviewed: yes  Pertinent Findings:  Culture from  grew EColi and Klebsiella   New Cultures show NG      Radiology Data Reviewed: yes  Pertinent Findings:  No new Radiology studies.    Current Medications:     Infusions:       Scheduled:   cyanocobalamin  1,000 mcg Oral Daily    losartan  100 mg Oral Daily    piperacillin-tazobactam (ZOSYN) IVPB  4.5 g Intravenous Q8H    scopolamine  1 patch Transdermal Q3 Days        PRN:  dextrose 50%, dextrose 50%, glucagon (human recombinant), glucose, glucose, HYDROcodone-acetaminophen,  insulin aspart U-100, ondansetron    Antibiotics and Day Number of Therapy:  Zosyn on 8/2    Lines and Day Number of Therapy:  Peripheral IV: R antecubital: 4 days    Assessment:     Azucena Morrison is a 68 y.o.female with  Patient Active Problem List    Diagnosis Date Noted    Abdominal wall abscess 08/02/2018    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    Polyp of colon 06/04/2018    Generalized abdominal pain     LLQ pain     Diabetes with skin complication     Unspecified local infection of skin and subcutaneous tissue     Diabetes mellitus     Type 2 diabetes mellitus with skin complication     Abscess of abdominal wall 07/11/2017    Tachycardia 12/09/2016    S/P exploratory laparotomy     S/P colostomy 11/14/2016    Fistula of intestine, excluding rectum and anus 11/14/2016    Drainage from wound 08/03/2016    Essential hypertension 08/02/2016    History of hemicolectomy 05/17/2016    Status post Aiyana's procedure 05/17/2016    HTN (hypertension) 05/17/2016    Type 2 diabetes mellitus without complication 05/17/2016    Suture granuloma 05/17/2016        Plan:     1) Abdominal wounds; surgical wounds, abdominal pain  - continue wound care and surgical management per primary team  - agree with IV zosyn for abx coverage; abdominal wounds with ESBL E coli, Klebsiella  - pain and anti-emetic management per primary team  - ID following- recommend continue zosyn and possibly fistulagram       2) Hypertension  - blood pressure currently at 128/75; has been mainly normotensive throughout stay  - losartan started   - adding BP home meds in stepwise fashion as needed     3) diabetes mellitus type II  - hold metformin while inpatient  - initiated sliding scale in insulin with q 4 accuchecks  -Glucose currently 141     4) microcytic anemia  - H/H 9.4/30.5 on admission, with MCV 77   - anemia panel: Fe: 87, TIBC: 332, Sat Fe: 26; Transferrin: 224, Ferritin: 97, Folate: 10, B12: 163.  - Started on B12 1000mcg  daily on 8/4     5) thrombocytosis  - likely reactive due to concurrent wound/infection burden  - continue to monitor     6) hyponatremia, hypotonic  - serum osmole, urine osmole, urine Na: 291, 618 and 138 respectively.  - recommend primary team reassess fluid status; patient likely does not need IVF at this time -> Na downtrending -> now resolved  - IVF previously recommended stopping; now stopped.        7) globulin gap  - consider HIV  - Hep A IgM, Hep B Igm, and surface antigen, HEP C Anitgen: all negative.     Nolvia Gusman MD  [facility] Internal Medicine -II    [facility] Medicine Hospitalist Pager numbers:   [facility] Hospitalist Medicine Team A (Omero/Cuauhtemoc): [phone]  [facility] Hospitalist Medicine Team B (Elizabet/Peng):  [phone]

## 2018-08-07 NOTE — PROGRESS NOTES
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 Plan for possible repair of fistula and possible colostomy in near future            Anticipated Disposition:     Thank you for your consult. I will follow-up with patient. Please contact us if you have any additional questions.    Bob Zarate MD   [facility] Infectious Disease Fellow, PGY4  ID pager [phone]  Infectious Disease  [facility]    Subjective:     Principal Problem:Abdominal wall abscess    HPI: This is a very pleasant 67 YO F from [place] who has a PMH of HTN, DM, kidney stones, recurrent diverticulitis that led to a 80% hemicolectomy in 2015. According to the patient she tells me that she was operated in later in [facility] and had a colostomy then. Later was seen bu Dr Alfaro and had a reversal of colostomy and closure in 11/2016 and other compliacted surgerires for fistula and incarcerated ventral hernia. According to the patient she had total 5 surgeries after the first surgery in [place].  According to the patient she had no abdominal wound infection till now but she did mention that she took possibly augmentin and bactrim for one year before this.  As note after reviewing records the wound culture from 7/11/2018 was growing Ecoli and according to her home meds she was prescribed bactrim on 7/18/2018. Not sure if she took that or not.  This last Friday she started  having 2-3 intensity dull abdominal discomfort with severe N/V and abdominal wound serosangious discharge. Denied any chills, no fevers and she was admitted to [facility] on 8/2/2018.  She has been on Pip/Tazo since 8/2/2018 and wound cx collected on 8/2/2018 is revealing Ecoli and ESBL Klebsiella now. ID consult called 8/6/2018 for choice of antibiotics.  Interval History: pt seen today, denies any fever, no N/V/D, tolerating diet, mild discomfort above wound site like before    Review of Systems   Constitutional: Negative.    HENT: Negative for congestion, dental problem, ear discharge and facial swelling.    Eyes: Negative for pain, discharge and itching.   Respiratory: Negative for apnea, choking and chest tightness.    Cardiovascular: Negative for chest pain and palpitations.   Gastrointestinal: Positive for abdominal pain. Negative for constipation, diarrhea, nausea and vomiting.   Genitourinary: Negative for difficulty urinating and dyspareunia.   Musculoskeletal: Negative for arthralgias, gait problem and myalgias.   Skin: Positive for wound. Negative for color change and pallor.        Skin with abdominal wound   Neurological: Negative for dizziness, facial asymmetry and headaches.   Psychiatric/Behavioral: Negative for agitation, behavioral problems and confusion.   All other systems reviewed and are negative.    Objective:     Vital Signs (Most Recent):  Temp: 98.7 °F (37.1 °C) (08/07/18 0812)  Pulse: 76 (08/07/18 0826)  Resp: 17 (08/07/18 0826)  BP: 128/75 (08/07/18 0812)  SpO2: 98 % (08/07/18 0826) Vital Signs (24h Range):  Temp:  [98.2 °F (36.8 °C)-99.7 °F (37.6 °C)] 98.7 °F (37.1 °C)  Pulse:  [72-88] 76  Resp:  [16-18] 17  SpO2:  [95 %-98 %] 98 %  BP: (128-158)/(73-84) 128/75     Weight: 75.9 kg (167 lb 5.3 oz)  Body mass index is 33.8 kg/m².    Estimated Creatinine Clearance: 59.5 mL/min (based on SCr of 1.1 mg/dL).    Physical Exam   Constitutional: She is oriented to person, place, and time. She appears  well-developed and well-nourished.   HENT:   Head: Normocephalic and atraumatic.   Eyes: EOM are normal. Pupils are equal, round, and reactive to light.   Neck: Normal range of motion. Neck supple. No JVD present. No tracheal deviation present.   Cardiovascular: Normal rate, regular rhythm, normal heart sounds and intact distal pulses.  Exam reveals no friction rub.    No murmur heard.  Pulmonary/Chest: Effort normal and breath sounds normal. No respiratory distress. She has no wheezes. She has no rales.   Abdominal: Soft. Bowel sounds are normal. She exhibits no distension. There is tenderness.   + wound on abdomen midline with serosangious and yellow discharge 2x2 cm   Musculoskeletal: Normal range of motion. She exhibits no edema, tenderness or deformity.   Lymphadenopathy:     She has no cervical adenopathy.   Neurological: She is alert and oriented to person, place, and time. No cranial nerve deficit.   Skin: Skin is warm.   + wound please see abdominal exam   Psychiatric: She has a normal mood and affect. Her behavior is normal. Thought content normal.   Nursing note and vitals reviewed.      Significant Labs:   Blood Culture:   Recent Labs  Lab 08/02/18 1918 08/02/18 1948   LABBLOO No Growth to date  No Growth to date  No Growth to date  No Growth to date  No Growth to date No Growth to date  No Growth to date  No Growth to date  No Growth to date  No Growth to date     BMP: No results for input(s): GLU, NA, K, CL, CO2, BUN, CREATININE, CALCIUM, MG in the last 48 hours.  Wound Culture:   Recent Labs  Lab 02/28/18  0910 04/18/18  0920 07/11/18  0930 08/02/18 1918   LABAERO STAPHYLOCOCCUS AUREUSModerate  ESCHERICHIA COLIFew ESCHERICHIA COLIMany ESCHERICHIA COLIModerate  ESCHERICHIA COLIFew ESCHERICHIA COLIMany  KLEBSIELLA OXYTOCA ESBLModerate     All pertinent labs within the past 24 hours have been reviewed.    Significant Imaging: I have reviewed all pertinent imaging results/findings within the  past 24 hours. Reviewed the results from barium enema/gastrograffin

## 2018-08-07 NOTE — NURSING
Pt stated she is going to surgery tomorrow. Clarified with Dr. Horton about surgery tomorrow. MD stated he has no plans to take her to surgery tomorrow. Pt notified about update on plan of care.

## 2018-08-07 NOTE — ASSESSMENT & PLAN NOTE
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 Plan for possible repair of fistula and possible colostomy in near future

## 2018-08-07 NOTE — SUBJECTIVE & OBJECTIVE
Interval History: pt seen today, denies any fever, no N/V/D, tolerating diet, mild discomfort above wound site like before    Review of Systems   Constitutional: Negative.    HENT: Negative for congestion, dental problem, ear discharge and facial swelling.    Eyes: Negative for pain, discharge and itching.   Respiratory: Negative for apnea, choking and chest tightness.    Cardiovascular: Negative for chest pain and palpitations.   Gastrointestinal: Positive for abdominal pain. Negative for constipation, diarrhea, nausea and vomiting.   Genitourinary: Negative for difficulty urinating and dyspareunia.   Musculoskeletal: Negative for arthralgias, gait problem and myalgias.   Skin: Positive for wound. Negative for color change and pallor.        Skin with abdominal wound   Neurological: Negative for dizziness, facial asymmetry and headaches.   Psychiatric/Behavioral: Negative for agitation, behavioral problems and confusion.   All other systems reviewed and are negative.    Objective:     Vital Signs (Most Recent):  Temp: 98.7 °F (37.1 °C) (08/07/18 0812)  Pulse: 76 (08/07/18 0826)  Resp: 17 (08/07/18 0826)  BP: 128/75 (08/07/18 0812)  SpO2: 98 % (08/07/18 0826) Vital Signs (24h Range):  Temp:  [98.2 °F (36.8 °C)-99.7 °F (37.6 °C)] 98.7 °F (37.1 °C)  Pulse:  [72-88] 76  Resp:  [16-18] 17  SpO2:  [95 %-98 %] 98 %  BP: (128-158)/(73-84) 128/75     Weight: 75.9 kg (167 lb 5.3 oz)  Body mass index is 33.8 kg/m².    Estimated Creatinine Clearance: 59.5 mL/min (based on SCr of 1.1 mg/dL).    Physical Exam   Constitutional: She is oriented to person, place, and time. She appears well-developed and well-nourished.   HENT:   Head: Normocephalic and atraumatic.   Eyes: EOM are normal. Pupils are equal, round, and reactive to light.   Neck: Normal range of motion. Neck supple. No JVD present. No tracheal deviation present.   Cardiovascular: Normal rate, regular rhythm, normal heart sounds and intact distal pulses.  Exam reveals  no friction rub.    No murmur heard.  Pulmonary/Chest: Effort normal and breath sounds normal. No respiratory distress. She has no wheezes. She has no rales.   Abdominal: Soft. Bowel sounds are normal. She exhibits no distension. There is tenderness.   + wound on abdomen midline with serosangious and yellow discharge 2x2 cm   Musculoskeletal: Normal range of motion. She exhibits no edema, tenderness or deformity.   Lymphadenopathy:     She has no cervical adenopathy.   Neurological: She is alert and oriented to person, place, and time. No cranial nerve deficit.   Skin: Skin is warm.   + wound please see abdominal exam   Psychiatric: She has a normal mood and affect. Her behavior is normal. Thought content normal.   Nursing note and vitals reviewed.      Significant Labs:   Blood Culture:   Recent Labs  Lab 08/02/18 1918 08/02/18 1948   LABBLOO No Growth to date  No Growth to date  No Growth to date  No Growth to date  No Growth to date No Growth to date  No Growth to date  No Growth to date  No Growth to date  No Growth to date     BMP: No results for input(s): GLU, NA, K, CL, CO2, BUN, CREATININE, CALCIUM, MG in the last 48 hours.  Wound Culture:   Recent Labs  Lab 02/28/18  0910 04/18/18  0920 07/11/18  0930 08/02/18 1918   LABAERO STAPHYLOCOCCUS AUREUSModerate  ESCHERICHIA COLIFew ESCHERICHIA COLIMany ESCHERICHIA COLIModerate  ESCHERICHIA COLIFew ESCHERICHIA COLIMany  KLEBSIELLA OXYTOCA ESBLModerate     All pertinent labs within the past 24 hours have been reviewed.    Significant Imaging: I have reviewed all pertinent imaging results/findings within the past 24 hours. Reviewed the results from barium enema/gastrograffin

## 2018-08-08 LAB
ALBUMIN SERPL BCP-MCNC: 2.8 G/DL
ALP SERPL-CCNC: 80 U/L
ALT SERPL W/O P-5'-P-CCNC: 15 U/L
ANION GAP SERPL CALC-SCNC: 10 MMOL/L
AST SERPL-CCNC: 12 U/L
BACTERIA BLD CULT: NORMAL
BACTERIA BLD CULT: NORMAL
BASOPHILS # BLD AUTO: 0.01 K/UL
BASOPHILS NFR BLD: 0.1 %
BILIRUB SERPL-MCNC: 0.4 MG/DL
BUN SERPL-MCNC: 12 MG/DL
CALCIUM SERPL-MCNC: 9.6 MG/DL
CHLORIDE SERPL-SCNC: 100 MMOL/L
CO2 SERPL-SCNC: 26 MMOL/L
CREAT SERPL-MCNC: 1.1 MG/DL
DIFFERENTIAL METHOD: ABNORMAL
EOSINOPHIL # BLD AUTO: 0.3 K/UL
EOSINOPHIL NFR BLD: 2.3 %
ERYTHROCYTE [DISTWIDTH] IN BLOOD BY AUTOMATED COUNT: 15.5 %
EST. GFR  (AFRICAN AMERICAN): >60 ML/MIN/1.73 M^2
EST. GFR  (NON AFRICAN AMERICAN): 52 ML/MIN/1.73 M^2
GLUCOSE SERPL-MCNC: 224 MG/DL
HCT VFR BLD AUTO: 30.3 %
HGB BLD-MCNC: 9.6 G/DL
LYMPHOCYTES # BLD AUTO: 1.6 K/UL
LYMPHOCYTES NFR BLD: 12.2 %
MCH RBC QN AUTO: 24.8 PG
MCHC RBC AUTO-ENTMCNC: 31.7 G/DL
MCV RBC AUTO: 78 FL
MONOCYTES # BLD AUTO: 0.7 K/UL
MONOCYTES NFR BLD: 5.6 %
NEUTROPHILS # BLD AUTO: 10.4 K/UL
NEUTROPHILS NFR BLD: 79.8 %
PLATELET # BLD AUTO: 366 K/UL
PLATELET BLD QL SMEAR: ABNORMAL
PMV BLD AUTO: 7.7 FL
POCT GLUCOSE: 163 MG/DL (ref 70–110)
POCT GLUCOSE: 205 MG/DL (ref 70–110)
POCT GLUCOSE: 206 MG/DL (ref 70–110)
POCT GLUCOSE: 273 MG/DL (ref 70–110)
POTASSIUM SERPL-SCNC: 4.1 MMOL/L
PROT SERPL-MCNC: 8.3 G/DL
RBC # BLD AUTO: 3.87 M/UL
SODIUM SERPL-SCNC: 136 MMOL/L
WBC # BLD AUTO: 13.05 K/UL

## 2018-08-08 PROCEDURE — 63600175 PHARM REV CODE 636 W HCPCS: Performed by: SURGERY

## 2018-08-08 PROCEDURE — 94761 N-INVAS EAR/PLS OXIMETRY MLT: CPT

## 2018-08-08 PROCEDURE — 25000003 PHARM REV CODE 250: Performed by: STUDENT IN AN ORGANIZED HEALTH CARE EDUCATION/TRAINING PROGRAM

## 2018-08-08 PROCEDURE — 80053 COMPREHEN METABOLIC PANEL: CPT

## 2018-08-08 PROCEDURE — 25000003 PHARM REV CODE 250: Performed by: INTERNAL MEDICINE

## 2018-08-08 PROCEDURE — 11000001 HC ACUTE MED/SURG PRIVATE ROOM

## 2018-08-08 PROCEDURE — 85025 COMPLETE CBC W/AUTO DIFF WBC: CPT

## 2018-08-08 PROCEDURE — 25000003 PHARM REV CODE 250: Performed by: SURGERY

## 2018-08-08 PROCEDURE — 36415 COLL VENOUS BLD VENIPUNCTURE: CPT

## 2018-08-08 RX ORDER — CYANOCOBALAMIN 1000 UG/ML
100 INJECTION, SOLUTION INTRAMUSCULAR; SUBCUTANEOUS DAILY
Status: COMPLETED | OUTPATIENT
Start: 2018-08-09 | End: 2018-08-11

## 2018-08-08 RX ADMIN — Medication 125 MG: at 07:08

## 2018-08-08 RX ADMIN — HYDROCODONE BITARTRATE AND ACETAMINOPHEN 1 TABLET: 5; 325 TABLET ORAL at 05:08

## 2018-08-08 RX ADMIN — ONDANSETRON 4 MG: 2 INJECTION INTRAMUSCULAR; INTRAVENOUS at 04:08

## 2018-08-08 RX ADMIN — HYDROCODONE BITARTRATE AND ACETAMINOPHEN 1 TABLET: 5; 325 TABLET ORAL at 04:08

## 2018-08-08 RX ADMIN — PIPERACILLIN AND TAZOBACTAM 4.5 G: 4; .5 INJECTION, POWDER, LYOPHILIZED, FOR SOLUTION INTRAVENOUS; PARENTERAL at 11:08

## 2018-08-08 RX ADMIN — LOSARTAN POTASSIUM 100 MG: 50 TABLET, FILM COATED ORAL at 08:08

## 2018-08-08 RX ADMIN — PIPERACILLIN AND TAZOBACTAM 4.5 G: 4; .5 INJECTION, POWDER, LYOPHILIZED, FOR SOLUTION INTRAVENOUS; PARENTERAL at 12:08

## 2018-08-08 RX ADMIN — PIPERACILLIN AND TAZOBACTAM 4.5 G: 4; .5 INJECTION, POWDER, LYOPHILIZED, FOR SOLUTION INTRAVENOUS; PARENTERAL at 07:08

## 2018-08-08 RX ADMIN — HYDROCODONE BITARTRATE AND ACETAMINOPHEN 1 TABLET: 5; 325 TABLET ORAL at 10:08

## 2018-08-08 RX ADMIN — INSULIN ASPART 3 UNITS: 100 INJECTION, SOLUTION INTRAVENOUS; SUBCUTANEOUS at 08:08

## 2018-08-08 RX ADMIN — PIPERACILLIN AND TAZOBACTAM 4.5 G: 4; .5 INJECTION, POWDER, LYOPHILIZED, FOR SOLUTION INTRAVENOUS; PARENTERAL at 03:08

## 2018-08-08 RX ADMIN — HYDROCODONE BITARTRATE AND ACETAMINOPHEN 1 TABLET: 5; 325 TABLET ORAL at 09:08

## 2018-08-08 RX ADMIN — INSULIN ASPART 2 UNITS: 100 INJECTION, SOLUTION INTRAVENOUS; SUBCUTANEOUS at 05:08

## 2018-08-08 RX ADMIN — CYANOCOBALAMIN TAB 1000 MCG 1000 MCG: 1000 TAB at 08:08

## 2018-08-08 RX ADMIN — Medication 125 MG: at 05:08

## 2018-08-08 NOTE — PROGRESS NOTES
[facility] Medicine Progress Note    Primary Team: [facility] Hospitalist Team B  Attending Physician: Dr. Khoury/Elizabet  Resident: Ranjit Braun  Intern: Naseem Martinez    Subjective:      Patient awake and alert this morning.   Continues to endorse 5/10 abdominal pain located umbilicus area. Dressing intact with minimal serosang drainage. No surrounding erythema or drainage.     Denies CP, SOB. Stooling and voiding appropriately. Tolerating PO without N/V. Denies fever, chills.      Objective:     Last 24 Hour Vital Signs:  BP  Min: 134/73  Max: 146/78  Temp  Av.8 °F (37.1 °C)  Min: 98.2 °F (36.8 °C)  Max: 99.6 °F (37.6 °C)  Pulse  Av.4  Min: 77  Max: 84  Resp  Av  Min: 16  Max: 20  SpO2  Av.3 %  Min: 96 %  Max: 98 %  I/O last 3 completed shifts:  In: 850 [P.O.:650; IV Piggyback:200]  Out: 1050 [Urine:1050]    Physical Examination:  General: Patient sitting up in bed in NAD  CV: RRR with no rubs murmurs or s3 or s4  Resp: CTAB with no wheezes crackles or rhonchi  Abdomen: Soft, NT, ND. Dressing covering lower abdomen no sign of surrounding erythema, dressing intact with minimal serosang drainage  Extremities- SCDs and bobbi hose in place, distal pulses equal and intact  Neuro: aax3    Laboratory:  Laboratory Data Reviewed: yes  Pertinent Findings:  POCT 136  Fe: 87, TIBC: 332, Sat Fe: 26; Transferrin: 224, Ferritin: 97, Folate: 10, B12: 163    Microbiology Data Reviewed: yes  Pertinent Findings:  Culture from  grew EColi and Klebsiella   New Cultures show NG      Radiology Data Reviewed: yes  Pertinent Findings:  No new Radiology studies.    Current Medications:     Infusions:       Scheduled:   cyanocobalamin  1,000 mcg Oral Daily    losartan  100 mg Oral Daily    piperacillin-tazobactam (ZOSYN) IVPB  4.5 g Intravenous Q8H    scopolamine  1 patch Transdermal Q3 Days        PRN:  dextrose 50%, dextrose 50%, glucagon (human recombinant), glucose, glucose, HYDROcodone-acetaminophen, insulin  aspart U-100, ondansetron, simethicone    Antibiotics and Day Number of Therapy:  Zosyn on 8/2    Lines and Day Number of Therapy:  Peripheral IV: L forearm 1d    Assessment:     Azucena Morrison is a 68 y.o.female with  Patient Active Problem List    Diagnosis Date Noted    Abdominal wall abscess 08/02/2018    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    Polyp of colon 06/04/2018    Generalized abdominal pain     LLQ pain     Diabetes with skin complication     Unspecified local infection of skin and subcutaneous tissue     Diabetes mellitus     Type 2 diabetes mellitus with skin complication     Abscess of abdominal wall 07/11/2017    Tachycardia 12/09/2016    S/P exploratory laparotomy     S/P colostomy 11/14/2016    Fistula of intestine, excluding rectum and anus 11/14/2016    Drainage from wound 08/03/2016    Essential hypertension 08/02/2016    History of hemicolectomy 05/17/2016    Status post Aiyana's procedure 05/17/2016    HTN (hypertension) 05/17/2016    Type 2 diabetes mellitus without complication 05/17/2016    Suture granuloma 05/17/2016        Plan:     1) Abdominal wounds; surgical wounds, abdominal pain  - continue wound care and surgical management per primary team  - agree with IV zosyn for abx coverage; abdominal wounds with ESBL E coli, Klebsiella  - pain and anti-emetic management per primary team  - fistulagram showed colonocutaneous fistula   - ID following- recommend continue zosyn  - OR Friday for diverting colostomy     2) Hypertension  - blood pressure currently at 140/78; has been mainly normotensive throughout stay  - cont losartan   - adding BP home meds in stepwise fashion as needed     3) diabetes mellitus type II  - hold metformin while inpatient  - initiated sliding scale in insulin with q 4 accuchecks  -Glucose currently 163     4) microcytic anemia  - H/H 9.4/30.5 on admission, with MCV 77   - anemia panel: Fe: 87, TIBC: 332, Sat Fe: 26; Transferrin: 224, Ferritin: 97,  Folate: 10, B12: 163.  - Started on B12 1000mcg daily on 8/4     5) thrombocytosis  - likely reactive due to concurrent wound/infection burden  - continue to monitor     6) hyponatremia, hypotonic  - serum osmole, urine osmole, urine Na: 291, 618 and 138 respectively.  - recommend primary team reassess fluid status; patient likely does not need IVF at this time -> Na was downtrending -> now resolved     7) globulin gap  - consider HIV  - Hep A IgM, Hep B Igm, and surface antigen, HEP C Anitgen: all negative.     Nolvia Gusman MD  [facility] Internal Medicine HO-II    [facility] Medicine Hospitalist Pager numbers:   [facility] Hospitalist Medicine Team A (Omero/Cuauhtemoc): [phone]  [facility] Hospitalist Medicine Team B (Elizabet/Peng):  [phone]

## 2018-08-08 NOTE — CONSULTS
[facility] Neuroendocrine Surgery/General Surgery  Consultation Note    SUBJECTIVE:     Reason for Consultation:   Non healing colocutaneous fistula    History of Present Illness:  69 yo F w/ complicated PMH/PSH including recurrent diverticulitis, and a draining colocutaneous fistula. She had a hemicolectomy in [place] in 2015 for diverticulitis, and developed a non healing surgical wound for which she is followed with Dr. Horton.  Prior to presenting to Dr. Melgar clinic, she was noted to have colocutaneous fistula, coloenterofistula and multiple additional small bowel/colon resections.  She had her initial surgery in [place], one at [facility], and an additional 3 at [facility].  She initally presented to [facility] 8/3 with a 6 day history of pain with PO not relieved by pain medication.  CT on admission consistent with significant postsurgical changes with a colo/enterocutaneous fistula, which was also seen extending from the sigmoid to a surgical wound on barium enema.  She is currently afebrile, and has a mild leukocytosis at 13.  US currently ordered to evaluate whether or not there is an abscess present in a previous surgical wound which would require I and D.    Allergies:  Review of patient's allergies indicates:   Allergen Reactions    Chlorhexidine gluconate Rash     Chlorhexidine/alcohol wipes. Rash and blistering.       Home Medications:  No current facility-administered medications on file prior to encounter.      Current Outpatient Prescriptions on File Prior to Encounter   Medication Sig    acetaminophen (TYLENOL) 325 MG tablet Take 2 tablets (650 mg total) by mouth every 8 (eight) hours as needed.    hydroCHLOROthiazide (HYDRODIURIL) 25 MG tablet     HYDROcodone-acetaminophen (NORCO) 5-325 mg per tablet Take 1 tablet by mouth every 6 (six) hours as needed.    LANTUS SOLOSTAR U-100 INSULIN 100 unit/mL (3 mL) InPn pen     losartan (COZAAR) 100 MG tablet Take 100 mg by mouth once daily.     metFORMIN  (GLUCOPHAGE) 1000 MG tablet     metformin (GLUCOPHAGE) 500 MG tablet Take 1,000 mg by mouth 2 (two) times daily with meals.    ondansetron (ZOFRAN) 4 MG tablet Take 2 tablets (8 mg total) by mouth every 8 (eight) hours as needed for Nausea.    ondansetron (ZOFRAN) 8 MG tablet     ondansetron (ZOFRAN) 8 MG tablet Take 1 tablet (8 mg total) by mouth every 8 (eight) hours as needed for Nausea.    sulfamethoxazole-trimethoprim 800-160mg (BACTRIM DS) 800-160 mg Tab Take 1 tablet by mouth 2 (two) times daily.    TRUEPLUS LANCETS 26 gauge Misc     TRUETEST TEST STRIPS Strp     mupirocin (BACTROBAN) 2 % ointment     promethazine (PHENERGAN) 6.25 mg/5 mL syrup     traMADol (ULTRAM) 50 mg tablet TAKE 1 TABLET BY MOUTH EVERY 6 HOURS       Past Medical History:   Diagnosis Date    Diabetes mellitus     Diabetes mellitus, type 2     Hypertension     Kidney stone      Past Surgical History:   Procedure Laterality Date     SECTION, CLASSIC      CHOLECYSTECTOMY      [facility]    COLONOSCOPY N/A 2018    Procedure: COLONOSCOPY;  Surgeon: Gibran Aguayo MD;  [facility];  Service: Endoscopy;  Laterality: N/A;    COLOSTOMY Left     INCISIONAL HERNIA REPAIR Right      History reviewed. No pertinent family history.  Social History   Substance Use Topics    Smoking status: Never Smoker    Smokeless tobacco: Never Used    Alcohol use No        Review of Systems:  Negative unless otherwise stated in HPI    OBJECTIVE:     Vital Signs:  Temp: 99.6 °F (37.6 °C) (18)  Pulse: 81 (18)  Resp: 20 (18)  BP: (!) 140/78 (18)  SpO2: 98 % (18 0416)    Physical Exam:  General: well developed, well nourished, no distress  HEENT: normocephalic, atraumatic, hearing grossly normal bilaterally, mucous membranes moist, EOM intact, no scleral icterus  Neck: supple, symmetrical, trachea midline, no JVD  Lungs:  clear to auscultation  bilaterally and normal respiratory effort  Cardiovascular: regular rate and rhythm.  Extremities: no cyanosis or edema, or clubbing, distal pulses palpable and symmetric  Abdomen: significant scarring to midline incision, punctate infraumbilical point with scant discharge, no malodorous discharge, mild tenderness to palpation at a point R of umbilicus, no surrounding erythema/induration, no overlying skin changes  Skin: Skin color, texture, turgor normal. No rashes or lesions  Musculoskeletal:no clubbing, cyanosis, no deformities  Neurologic: No focal numbness or weakness  Psych/Behavioral:  Alert and oriented, appropriate affect.    Laboratory:  Labs Reviewed   CBC W/ AUTO DIFFERENTIAL - Abnormal; Notable for the following:        Result Value    Hemoglobin 10.1 (*)     Hematocrit 30.6 (*)     MCV 77 (*)     MCH 25.3 (*)     RDW 15.5 (*)     Platelets 570 (*)     MPV 7.7 (*)     All other components within normal limits   COMPREHENSIVE METABOLIC PANEL - Abnormal; Notable for the following:     Sodium 132 (*)     Glucose 123 (*)     Total Protein 8.6 (*)     Albumin 3.4 (*)     eGFR if  45 (*)     eGFR if non  39 (*)     All other components within normal limits   URINALYSIS, REFLEX TO URINE CULTURE - Abnormal; Notable for the following:     Appearance, UA Hazy (*)     Specific Gravity, UA >=1.030 (*)     Protein, UA Trace (*)     Occult Blood UA 1+ (*)     All other components within normal limits    Narrative:     Preferred Collection Type->Urine, Clean Catch   LIPASE   URINALYSIS MICROSCOPIC    Narrative:     Preferred Collection Type->Urine, Clean Catch       Diagnostic Results:  Imaging Results          CT Abdomen Pelvis With Contrast (Final result)  Result time 08/02/18 20:18:38    Final result by Gasper Vasquez MD (08/02/18 20:18:38)                 Impression:      1. Extensive postsurgical changes of multiple prior bowel resections.  Bulging of chronically dilated bowel  loops seen within the midline anterior abdomen with possible tethering of bowel loops to the anterior abdominal wall in this region and possible tethering of bowel loops with chronic changes seen in the left lower quadrant.  Potential enterocutaneous fistula not excluded.  Small air and fluid collection measuring 2 cm within the anterior abdominal wall which may represent small abscess versus focal herniated loop of small bowel.  No convincing evidence of bowel obstruction.  2. Additional findings as detailed above.      Electronically signed by: Gasper Vasquez MD  Date:    08/02/2018  Time:    20:18             Narrative:    EXAMINATION:  CT ABDOMEN PELVIS WITH CONTRAST    CLINICAL HISTORY:  Abdominal pain;    TECHNIQUE:  Low dose axial images, sagittal and coronal reformations were obtained from the lung bases to the pubic symphysis following the IV administration of 75 mL of Omnipaque 350 .  Oral contrast was not given.    COMPARISON:  CT abdomen and pelvis from 06/15/2018.    FINDINGS:  The visualized portion of the heart is unremarkable.  The lung bases are clear.    No significant hepatic abnormalities are identified.  There is no intra-or extrahepatic biliary ductal dilatation.  Gallbladder has been removed.  Small hiatal hernia is seen.  Spleen, pancreas, and adrenal glands are unremarkable.    Kidneys are functioning with no evidence of hydronephrosis.  Left renal cyst is visualized.  Urinary bladder is unremarkable.  Uterus shows no significant abnormalities.    Extensive postsurgical changes are visualized consistent with multiple prior bowel resections.  There is bulging of chronically dilated bowel loops seen within the midline anterior abdomen supraumbilical region.  Possible tethering of bowel loops seen to the anterior abdominal wall inferior to this location.  Small herniated bowel loop versus small air and fluid collection, possible abscess seen at the level of the umbilicus measuring 2 cm.  No  free air identified.  Chronic soft tissue thickening is visualized inferiorly.  Potential enterocutaneous fistula not excluded.  Chronic changes and possible tethering seen of bowel loops within the left lower quadrant, relatively unchanged in appearance from previous exams.    Aorta tapers normally.    No acute osseous abnormality identified.                                ASSESSMENT:     69 yo F w/ extensive PMH/PSH including diverticulitis s/p hemicolectomy from which she developed several fistulas, both coloenteric, and colocutaneous s/p multiple bowel/fistula resections, now with colocutaneous fistula seen on barium enema    PLAN:     Case discussed with Dr. Posadas  Would recommend NPO, TPN  Endoscopic evaluation, and clipping of fistulous tract after injection of dye to cutaneous fistula

## 2018-08-08 NOTE — PLAN OF CARE
Progress notes reviewed.     Patient likely to OR this week. Treatment to continue.      hx:  HPI: This is a very pleasant 69 YO F from [place] who has a PMH of HTN, DM, kidney stones, recurrent diverticulitis that led to a 80% hemicolectomy in 2015. According to the patient she tells me that she was operated in later in [facility] and had a colostomy then. Later was seen bu Dr Alfaro and had a reversal of colostomy and closure in 11/2016 and other compliacted surgerires for fistula and incarcerated ventral hernia. According to the patient she had total 5 surgeries after the first surgery in [place].  According to the patient she had no abdominal wound infection till now but she did mention that she took possibly augmentin and bactrim for one year before this.  As note after reviewing records the wound culture from 7/11/2018 was growing Ecoli and according to her home meds she was prescribed bactrim on 7/18/2018. Not sure if she took that or not.  This last Friday she started having 2-3 intensity dull abdominal discomfort with severe N/V and abdominal wound serosangious discharge. Denied any chills, no fevers and she was admitted to [facility] on 8/2/2018.  She has been on Pip/Tazo since 8/2/2018 and wound cx collected on 8/2/2018 is revealing Ecoli and ESBL Klebsiella now. ID consult called 8/6/2018 for choice of antibiotics.       08/08/18 3281   Discharge Reassessment   Assessment Type Discharge Planning Reassessment   Provided patient/caregiver education on the expected discharge date and the discharge plan Yes   Do you have any problems affording any of your prescribed medications? No   Discharge Plan A Home with family;Home Health   Discharge Plan B Home with family;Home Health   Patient choice form signed by patient/caregiver Yes   Can the patient answer the patient profile reliably? Yes, cognitively intact   How does the patient rate their overall health at the present time? Good   Describe the patient's  ability to walk at the present time. Minor restrictions or changes   How often would a person be available to care for the patient? Whenever needed   Number of comorbid conditions (as recorded on the chart) Three   During the past month, has the patient often been bothered by feeling down, depressed or hopeless? No   During the past month, has the patient often been bothered by little interest or pleasure in doing things? No

## 2018-08-08 NOTE — PROGRESS NOTES
"Surgery follow up  Still c/o pain ;lower abdomen  BP (!) 140/78 (Patient Position: Lying)   Pulse 81   Temp 99.6 °F (37.6 °C) (Oral)   Resp 20   Ht 4' 11" (1.499 m)   Wt 75.9 kg (167 lb 5.3 oz)   SpO2 98%   Breastfeeding? No   BMI 33.80 kg/m²   I/O last 3 completed shifts:  In: 850 [P.O.:650; IV Piggyback:200]  Out: 1050 [Urine:1050]  No intake/output data recorded.      Recent Results (from the past 336 hour(s))   CBC auto differential    Collection Time: 08/02/18  7:17 PM   Result Value Ref Range    WBC 8.68 3.90 - 12.70 K/uL    Hemoglobin 10.1 (L) 12.0 - 16.0 g/dL    Hematocrit 30.6 (L) 37.0 - 48.5 %    Platelets 570 (H) 150 - 350 K/uL   CBC auto differential    Collection Time: 08/01/18 11:18 AM   Result Value Ref Range    WBC 7.53 3.90 - 12.70 K/uL    Hemoglobin 9.4 (L) 12.0 - 16.0 g/dL    Hematocrit 30.5 (L) 37.0 - 48.5 %    Platelets 496 (H) 150 - 350 K/uL   No results found for this or any previous visit (from the past 336 hour(s)).  Abdominal wall lower swelling with mild drainage form the lower part of the abdominal wall , no fever   Plan: may need I&D prior to colon resection and colostomt will obtain second opinion from .  "

## 2018-08-08 NOTE — PLAN OF CARE
Problem: Patient Care Overview  Goal: Plan of Care Review  Outcome: Ongoing (interventions implemented as appropriate)  Pt AAOx4, no family members at bedside throughout shift. Pt complains of intermittent abd discomfort and gas pain but denies n/v/d and SOB. Diabetic diet tolerated well. Dressing to abd CDI. Contact isolation precautions continued. Blood glucose checks continued. Safety maintained, bed alarm on - will cont to monitor.

## 2018-08-08 NOTE — PROGRESS NOTES
"General Surgery follow up.    Patient friend present in the room and speaks Bhutanese, translated for communication,    C/o mild abdominal pain , no nausea or vomiting , no fever or chills     BP (!) 170/78 (Patient Position: Lying)   Pulse 84   Temp 98.4 °F (36.9 °C) (Oral)   Resp 20   Ht 4' 11" (1.499 m)   Wt 75.9 kg (167 lb 5.3 oz)   SpO2 97%   Breastfeeding? No   BMI 33.80 kg/m²   I/O last 3 completed shifts:  In: 850 [P.O.:650; IV Piggyback:200]  Out: 1050 [Urine:1050]  No intake/output data recorded.    Recent Results (from the past 336 hour(s))   CBC auto differential    Collection Time: 08/08/18 10:24 AM   Result Value Ref Range    WBC 13.05 (H) 3.90 - 12.70 K/uL    Hemoglobin 9.6 (L) 12.0 - 16.0 g/dL    Hematocrit 30.3 (L) 37.0 - 48.5 %    Platelets 366 (H) 150 - 350 K/uL   CBC auto differential    Collection Time: 08/02/18  7:17 PM   Result Value Ref Range    WBC 8.68 3.90 - 12.70 K/uL    Hemoglobin 10.1 (L) 12.0 - 16.0 g/dL    Hematocrit 30.6 (L) 37.0 - 48.5 %    Platelets 570 (H) 150 - 350 K/uL   CBC auto differential    Collection Time: 08/01/18 11:18 AM   Result Value Ref Range    WBC 7.53 3.90 - 12.70 K/uL    Hemoglobin 9.4 (L) 12.0 - 16.0 g/dL    Hematocrit 30.5 (L) 37.0 - 48.5 %    Platelets 496 (H) 150 - 350 K/uL     No results found for this or any previous visit (from the past 336 hour(s)).     Discussed with Dr Rivas may drain the abdominal wall abscess prior to formal exploration of the abdomen and colon resection , may try to do in am or Friday am. Explained to patient in front of friend who speaks Bhutanese and english .She understands the treatment plan.    "

## 2018-08-09 ENCOUNTER — ANESTHESIA EVENT (OUTPATIENT)
Dept: SURGERY | Facility: HOSPITAL | Age: 68
DRG: 857 | End: 2018-08-09
Payer: MEDICARE

## 2018-08-09 LAB
POCT GLUCOSE: 181 MG/DL (ref 70–110)
POCT GLUCOSE: 188 MG/DL (ref 70–110)

## 2018-08-09 PROCEDURE — 11000001 HC ACUTE MED/SURG PRIVATE ROOM

## 2018-08-09 PROCEDURE — 93005 ELECTROCARDIOGRAM TRACING: CPT

## 2018-08-09 PROCEDURE — 25000003 PHARM REV CODE 250: Performed by: SURGERY

## 2018-08-09 PROCEDURE — 25000003 PHARM REV CODE 250: Performed by: STUDENT IN AN ORGANIZED HEALTH CARE EDUCATION/TRAINING PROGRAM

## 2018-08-09 PROCEDURE — 63600175 PHARM REV CODE 636 W HCPCS: Performed by: STUDENT IN AN ORGANIZED HEALTH CARE EDUCATION/TRAINING PROGRAM

## 2018-08-09 PROCEDURE — 94761 N-INVAS EAR/PLS OXIMETRY MLT: CPT

## 2018-08-09 PROCEDURE — 63600175 PHARM REV CODE 636 W HCPCS: Performed by: SURGERY

## 2018-08-09 PROCEDURE — 93010 ELECTROCARDIOGRAM REPORT: CPT | Mod: ,,, | Performed by: INTERNAL MEDICINE

## 2018-08-09 RX ADMIN — CYANOCOBALAMIN 100 MCG: 1000 INJECTION, SOLUTION INTRAMUSCULAR; SUBCUTANEOUS at 09:08

## 2018-08-09 RX ADMIN — INSULIN DETEMIR 10 UNITS: 100 INJECTION, SOLUTION SUBCUTANEOUS at 09:08

## 2018-08-09 RX ADMIN — ONDANSETRON 4 MG: 2 INJECTION INTRAMUSCULAR; INTRAVENOUS at 04:08

## 2018-08-09 RX ADMIN — SCOPOLAMINE 1 PATCH: 1 PATCH, EXTENDED RELEASE TRANSDERMAL at 09:08

## 2018-08-09 RX ADMIN — LOSARTAN POTASSIUM 100 MG: 50 TABLET, FILM COATED ORAL at 09:08

## 2018-08-09 RX ADMIN — PIPERACILLIN AND TAZOBACTAM 4.5 G: 4; .5 INJECTION, POWDER, LYOPHILIZED, FOR SOLUTION INTRAVENOUS; PARENTERAL at 03:08

## 2018-08-09 RX ADMIN — HYDROCODONE BITARTRATE AND ACETAMINOPHEN 1 TABLET: 5; 325 TABLET ORAL at 03:08

## 2018-08-09 RX ADMIN — INSULIN ASPART 3 UNITS: 100 INJECTION, SOLUTION INTRAVENOUS; SUBCUTANEOUS at 09:08

## 2018-08-09 RX ADMIN — HYDROCODONE BITARTRATE AND ACETAMINOPHEN 1 TABLET: 5; 325 TABLET ORAL at 08:08

## 2018-08-09 RX ADMIN — PIPERACILLIN AND TAZOBACTAM 4.5 G: 4; .5 INJECTION, POWDER, LYOPHILIZED, FOR SOLUTION INTRAVENOUS; PARENTERAL at 06:08

## 2018-08-09 RX ADMIN — HYDROCODONE BITARTRATE AND ACETAMINOPHEN 1 TABLET: 5; 325 TABLET ORAL at 09:08

## 2018-08-09 RX ADMIN — HYDROCODONE BITARTRATE AND ACETAMINOPHEN 1 TABLET: 5; 325 TABLET ORAL at 04:08

## 2018-08-09 RX ADMIN — PIPERACILLIN AND TAZOBACTAM 4.5 G: 4; .5 INJECTION, POWDER, LYOPHILIZED, FOR SOLUTION INTRAVENOUS; PARENTERAL at 11:08

## 2018-08-09 NOTE — ASSESSMENT & PLAN NOTE
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 But plan now will to operate on the wound right now only  ---- Would recommend to obtain intra-operative cultures if possible

## 2018-08-09 NOTE — PLAN OF CARE
Problem: Patient Care Overview  Goal: Plan of Care Review  Outcome: Ongoing (interventions implemented as appropriate)  Pt awake and alert. Language line used for all communication with patient; Urdu speaking patient. Contact precautions maintained per order. Complaints of lower abdominal pain with norco given, slight relief. Complaints of nausea with ordered simethicone given with relief. Abdominal dressing changed per orders. Blood sugar checked with Sliding scale given per ordered. Zosyn given per orders. Up with assist to bathroom. Safety maintained.

## 2018-08-09 NOTE — PROGRESS NOTES
"Surgey follow up  BP (!) 157/85 (BP Location: Right arm, Patient Position: Lying)   Pulse 87   Temp 99.4 °F (37.4 °C) (Oral)   Resp 19   Ht 4' 11" (1.499 m)   Wt 77.1 kg (169 lb 15.6 oz)   SpO2 96%   Breastfeeding? No   BMI 34.33 kg/m²   I/O last 3 completed shifts:  In: 1070 [P.O.:670; IV Piggyback:400]  Out: -   I/O this shift:  In: 740 [P.O.:740]  Out: 0   Recent Results (from the past 336 hour(s))   CBC auto differential    Collection Time: 08/08/18 10:24 AM   Result Value Ref Range    WBC 13.05 (H) 3.90 - 12.70 K/uL    Hemoglobin 9.6 (L) 12.0 - 16.0 g/dL    Hematocrit 30.3 (L) 37.0 - 48.5 %    Platelets 366 (H) 150 - 350 K/uL   CBC auto differential    Collection Time: 08/02/18  7:17 PM   Result Value Ref Range    WBC 8.68 3.90 - 12.70 K/uL    Hemoglobin 10.1 (L) 12.0 - 16.0 g/dL    Hematocrit 30.6 (L) 37.0 - 48.5 %    Platelets 570 (H) 150 - 350 K/uL   CBC auto differential    Collection Time: 08/01/18 11:18 AM   Result Value Ref Range    WBC 7.53 3.90 - 12.70 K/uL    Hemoglobin 9.4 (L) 12.0 - 16.0 g/dL    Hematocrit 30.5 (L) 37.0 - 48.5 %    Platelets 496 (H) 150 - 350 K/uL     No results found for this or any previous visit (from the past 336 hour(s)).  No abdominal pain, moving bowels  Patient seen with head nurse , explained the procedure of draining the abdominal wall abscess in am and treat with iv antibiotics for now . She understands and agrees with the treatment plan, consent signed and witnessed.  "

## 2018-08-09 NOTE — PROGRESS NOTES
[facility] Medicine Progress Note    Primary Team: [facility] Hospitalist Team B  Attending Physician: Dr. Khoury/Elizabet  Resident: Ranjit Braun  Intern: Naseem Martinez    Subjective:      Patient awake and alert this morning.   Continues to endorse 5/10 abdominal pain located umbilicus area. Controlled with pain meds.  Dressing c/d/i with no surrounding erythema.     Denies CP, SOB. Stooling and voiding appropriately. Tolerating PO without N/V. Denies fever, chills.      Objective:     Last 24 Hour Vital Signs:  BP  Min: 130/80  Max: 170/78  Temp  Av.7 °F (37.1 °C)  Min: 97.3 °F (36.3 °C)  Max: 99.6 °F (37.6 °C)  Pulse  Av.8  Min: 77  Max: 86  Resp  Av  Min: 18  Max: 20  SpO2  Av.3 %  Min: 94 %  Max: 97 %  Weight  Av.1 kg (169 lb 15.6 oz)  Min: 77.1 kg (169 lb 15.6 oz)  Max: 77.1 kg (169 lb 15.6 oz)  I/O last 3 completed shifts:  In: 1070 [P.O.:670; IV Piggyback:400]  Out: -     Physical Examination:  General: Patient sitting up in bed in NAD  CV: RRR with no rubs murmurs or s3 or s4  Resp: CTAB with no wheezes crackles or rhonchi  Abdomen: Soft, NT, ND. Dressing covering lower abdomen clean, dry, and intact, no sign of surrounding erythema  Extremities- SCDs and bobbi hose in place, distal pulses equal and intact  Neuro: aax3    Laboratory:  Laboratory Data Reviewed: yes  Pertinent Findings:  POCT 136  Fe: 87, TIBC: 332, Sat Fe: 26; Transferrin: 224, Ferritin: 97, Folate: 10, B12: 163    Microbiology Data Reviewed: yes  Pertinent Findings:  Culture from  grew EColi and Klebsiella   New Cultures show NG      Radiology Data Reviewed: yes  Pertinent Findings:  No new Radiology studies.    Current Medications:     Infusions:       Scheduled:   cyanocobalamin  100 mcg Intramuscular Daily    losartan  100 mg Oral Daily    piperacillin-tazobactam (ZOSYN) IVPB  4.5 g Intravenous Q8H    scopolamine  1 patch Transdermal Q3 Days        PRN:  dextrose 50%, dextrose 50%, glucagon (human  recombinant), glucose, glucose, HYDROcodone-acetaminophen, insulin aspart U-100, ondansetron, simethicone    Antibiotics and Day Number of Therapy:  Zosyn on 8/2    Lines and Day Number of Therapy:  Peripheral IV: L forearm 1d    Assessment:     Azucena Morrison is a 68 y.o.female with  Patient Active Problem List    Diagnosis Date Noted    Abdominal wall abscess 08/02/2018    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    Polyp of colon 06/04/2018    Generalized abdominal pain     LLQ pain     Diabetes with skin complication     Unspecified local infection of skin and subcutaneous tissue     Diabetes mellitus     Type 2 diabetes mellitus with skin complication     Abscess of abdominal wall 07/11/2017    Tachycardia 12/09/2016    S/P exploratory laparotomy     S/P colostomy 11/14/2016    Fistula of intestine, excluding rectum and anus 11/14/2016    Drainage from wound 08/03/2016    Essential hypertension 08/02/2016    History of hemicolectomy 05/17/2016    Status post Aiyana's procedure 05/17/2016    HTN (hypertension) 05/17/2016    Type 2 diabetes mellitus without complication 05/17/2016    Suture granuloma 05/17/2016        Plan:     1) Abdominal wounds; surgical wounds, abdominal pain  - continue wound care and surgical management per primary team  - agree with IV zosyn for abx coverage; abdominal wounds with ESBL E coli, Klebsiella  - pain and anti-emetic management per primary team  - fistulagram showed colonocutaneous fistula   - ID following- recommend continue zosyn and obtain intraoperative cultures  - abd wall abscess to be drained prior to exploration by surgery   - OR Friday for diverting colostomy per surgery      2) Hypertension  - blood pressure currently at 10/80; has been mainly normotensive throughout stay  - cont losartan 100  - adding BP home meds in stepwise fashion as needed     3) diabetes mellitus type II  - hold metformin while inpatient  - initiated sliding scale in insulin with q 4  accuchecks  -Glucose currently 181  - will start patient on levemir 10 today for better glycemic control, will hold for surgery lou     4) microcytic anemia  - H/H 9.4/30.5 on admission, with MCV 77   - anemia panel: Fe: 87, TIBC: 332, Sat Fe: 26; Transferrin: 224, Ferritin: 97, Folate: 10, B12: 163.  - Started on B12 1000mcg daily on 8/4     5) thrombocytosis  - likely reactive due to concurrent wound/infection burden  - continue to monitor  - downtrending 570 -> 366    6) hyponatremia, hypotonic, resolved  - serum osmole, urine osmole, urine Na: 291, 618 and 138 respectively.  - recommend primary team reassess fluid status; patient likely does not need IVF at this time -> Na was downtrending -> now resolved     7) globulin gap  - consider HIV  - Hep A IgM, Hep B Igm, and surface antigen, HEP C Anitgen: all negative.     Nolvia Gusman MD  [facility] HO-II    [facility] Medicine Hospitalist Pager numbers:   [facility] Hospitalist Medicine Team A (Omero/Cuauhtemoc): [phone]  [facility] Hospitalist Medicine Team B (Elizabet/Peng):  [phone]

## 2018-08-09 NOTE — SUBJECTIVE & OBJECTIVE
Interval History: pt seen this afternoon. As per her she had severe abdominal pain this morning and received pain medicine and pain was less when I saw her in the afternoon    Review of Systems   Constitutional: Negative.    HENT: Negative for congestion, dental problem, ear discharge and facial swelling.    Eyes: Negative for pain, discharge and itching.   Respiratory: Negative for apnea, choking and chest tightness.    Cardiovascular: Negative for chest pain and palpitations.   Gastrointestinal: Positive for abdominal pain. Negative for constipation, diarrhea, nausea and vomiting.   Genitourinary: Negative for difficulty urinating and dyspareunia.   Musculoskeletal: Negative for arthralgias, gait problem and myalgias.   Skin: Positive for wound. Negative for color change and pallor.        Skin with abdominal wound   Neurological: Negative for dizziness, facial asymmetry and headaches.   Psychiatric/Behavioral: Negative for agitation, behavioral problems and confusion.   All other systems reviewed and are negative.    Objective:     Vital Signs (Most Recent):  Temp: 98.4 °F (36.9 °C) (08/08/18 1608)  Pulse: 84 (08/08/18 1608)  Resp: 20 (08/08/18 1608)  BP: (!) 170/78 (08/08/18 1608)  SpO2: 95 % (08/08/18 1932) Vital Signs (24h Range):  Temp:  [98.2 °F (36.8 °C)-99.6 °F (37.6 °C)] 98.4 °F (36.9 °C)  Pulse:  [79-84] 84  Resp:  [18-20] 20  SpO2:  [95 %-98 %] 95 %  BP: (134-170)/(73-78) 170/78     Weight: 75.9 kg (167 lb 5.3 oz)  Body mass index is 33.8 kg/m².    Estimated Creatinine Clearance: 59.5 mL/min (based on SCr of 1.1 mg/dL).    Physical Exam   Constitutional: She is oriented to person, place, and time. She appears well-developed and well-nourished.   HENT:   Head: Normocephalic and atraumatic.   Eyes: EOM are normal. Pupils are equal, round, and reactive to light.   Neck: Normal range of motion. Neck supple. No JVD present. No tracheal deviation present.   Cardiovascular: Normal rate, regular rhythm, normal  heart sounds and intact distal pulses.  Exam reveals no friction rub.    No murmur heard.  Pulmonary/Chest: Effort normal and breath sounds normal. No respiratory distress. She has no wheezes. She has no rales.   Abdominal: Soft. Bowel sounds are normal. She exhibits no distension. There is tenderness.   + wound on abdomen midline with serosangious and yellow discharge 2x2 cm   Musculoskeletal: Normal range of motion. She exhibits no edema, tenderness or deformity.   Lymphadenopathy:     She has no cervical adenopathy.   Neurological: She is alert and oriented to person, place, and time. No cranial nerve deficit.   Skin: Skin is warm.   + wound please see abdominal exam   Psychiatric: She has a normal mood and affect. Her behavior is normal. Thought content normal.   Nursing note and vitals reviewed.      Significant Labs:   Blood Culture:   Recent Labs  Lab 08/02/18  1918 08/02/18  1948   LABBLOO No growth after 5 days. No growth after 5 days.     BMP:   Recent Labs  Lab 08/08/18  1024   *      K 4.1      CO2 26   BUN 12   CREATININE 1.1   CALCIUM 9.6     CBC:   Recent Labs  Lab 08/08/18  1024   WBC 13.05*   HGB 9.6*   HCT 30.3*   *     Wound Culture:   Recent Labs  Lab 02/28/18  0910 04/18/18  0920 07/11/18  0930 08/02/18  1918   LABAERO STAPHYLOCOCCUS AUREUSModerate  ESCHERICHIA COLIFew ESCHERICHIA COLIMany ESCHERICHIA COLIModerate  ESCHERICHIA COLIFew ESCHERICHIA COLIMany  KLEBSIELLA OXYTOCA ESBLModerate     All pertinent labs within the past 24 hours have been reviewed.    Significant Imaging: I have reviewed all pertinent imaging results/findings within the past 24 hours.

## 2018-08-09 NOTE — PLAN OF CARE
Problem: Patient Care Overview  Goal: Individualization & Mutuality  Outcome: Ongoing (interventions implemented as appropriate)  Patient safety maintained. Pain controlled with pain meds. Patient sleep and rested over the night. N&V controled with meds,  No diarrhea. No distress noted. Will continue to monitor.

## 2018-08-09 NOTE — PROGRESS NOTES
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 But plan now will to operate on the wound right now only  ---- Would recommend to obtain intra-operative cultures if possible            Anticipated Disposition:     Thank you for your consult. I will follow-up with patient. Please contact us if you have any additional questions.    Bob Zarate MD   [employer] Infectious Disease Fellow, PGY4  ID pager [phone]    Infectious Disease  [facility]    Subjective:     Principal Problem:Abdominal wall abscess    HPI: This is a very pleasant 67 YO F from [place] who has a PMH of HTN, DM, kidney stones, recurrent diverticulitis that led to a 80% hemicolectomy in 2015. According to the patient she tells me that she was operated in later in [facility] and had a colostomy then. Later was seen bu Dr Alfaro and had a reversal of colostomy and closure in 11/2016 and other compliacted surgerires for fistula and incarcerated ventral hernia. According to the patient she had total 5 surgeries after the first surgery in [place].  According to the patient she had no abdominal wound infection till now but she did mention that she took possibly augmentin and bactrim for one year before this.  As note after reviewing records the wound culture from 7/11/2018 was growing Ecoli and according to her home meds she was prescribed bactrim on 7/18/2018. Not sure  if she took that or not.  This last Friday she started having 2-3 intensity dull abdominal discomfort with severe N/V and abdominal wound serosangious discharge. Denied any chills, no fevers and she was admitted to [facility] on 8/2/2018.  She has been on Pip/Tazo since 8/2/2018 and wound cx collected on 8/2/2018 is revealing Ecoli and ESBL Klebsiella now. ID consult called 8/6/2018 for choice of antibiotics.  Interval History: pt seen this afternoon. As per her she had severe abdominal pain this morning and received pain medicine and pain was less when I saw her in the afternoon    Review of Systems   Constitutional: Negative.    HENT: Negative for congestion, dental problem, ear discharge and facial swelling.    Eyes: Negative for pain, discharge and itching.   Respiratory: Negative for apnea, choking and chest tightness.    Cardiovascular: Negative for chest pain and palpitations.   Gastrointestinal: Positive for abdominal pain. Negative for constipation, diarrhea, nausea and vomiting.   Genitourinary: Negative for difficulty urinating and dyspareunia.   Musculoskeletal: Negative for arthralgias, gait problem and myalgias.   Skin: Positive for wound. Negative for color change and pallor.        Skin with abdominal wound   Neurological: Negative for dizziness, facial asymmetry and headaches.   Psychiatric/Behavioral: Negative for agitation, behavioral problems and confusion.   All other systems reviewed and are negative.    Objective:     Vital Signs (Most Recent):  Temp: 98.4 °F (36.9 °C) (08/08/18 1608)  Pulse: 84 (08/08/18 1608)  Resp: 20 (08/08/18 1608)  BP: (!) 170/78 (08/08/18 1608)  SpO2: 95 % (08/08/18 1932) Vital Signs (24h Range):  Temp:  [98.2 °F (36.8 °C)-99.6 °F (37.6 °C)] 98.4 °F (36.9 °C)  Pulse:  [79-84] 84  Resp:  [18-20] 20  SpO2:  [95 %-98 %] 95 %  BP: (134-170)/(73-78) 170/78     Weight: 75.9 kg (167 lb 5.3 oz)  Body mass index is 33.8 kg/m².    Estimated Creatinine Clearance: 59.5 mL/min (based on  SCr of 1.1 mg/dL).    Physical Exam   Constitutional: She is oriented to person, place, and time. She appears well-developed and well-nourished.   HENT:   Head: Normocephalic and atraumatic.   Eyes: EOM are normal. Pupils are equal, round, and reactive to light.   Neck: Normal range of motion. Neck supple. No JVD present. No tracheal deviation present.   Cardiovascular: Normal rate, regular rhythm, normal heart sounds and intact distal pulses.  Exam reveals no friction rub.    No murmur heard.  Pulmonary/Chest: Effort normal and breath sounds normal. No respiratory distress. She has no wheezes. She has no rales.   Abdominal: Soft. Bowel sounds are normal. She exhibits no distension. There is tenderness.   + wound on abdomen midline with serosangious and yellow discharge 2x2 cm   Musculoskeletal: Normal range of motion. She exhibits no edema, tenderness or deformity.   Lymphadenopathy:     She has no cervical adenopathy.   Neurological: She is alert and oriented to person, place, and time. No cranial nerve deficit.   Skin: Skin is warm.   + wound please see abdominal exam   Psychiatric: She has a normal mood and affect. Her behavior is normal. Thought content normal.   Nursing note and vitals reviewed.      Significant Labs:   Blood Culture:   Recent Labs  Lab 08/02/18 1918 08/02/18 1948   LABBLOO No growth after 5 days. No growth after 5 days.     BMP:   Recent Labs  Lab 08/08/18  1024   *      K 4.1      CO2 26   BUN 12   CREATININE 1.1   CALCIUM 9.6     CBC:   Recent Labs  Lab 08/08/18  1024   WBC 13.05*   HGB 9.6*   HCT 30.3*   *     Wound Culture:   Recent Labs  Lab 02/28/18  0910 04/18/18  0920 07/11/18  0930 08/02/18  1918   LABAERO STAPHYLOCOCCUS AUREUSModerate  ESCHERICHIA COLIFew ESCHERICHIA COLIMany ESCHERICHIA COLIModerate  ESCHERICHIA COLIFew ESCHERICHIA COLIMany  KLEBSIELLA OXYTOCA ESBLModerate     All pertinent labs within the past 24 hours have been  reviewed.    Significant Imaging: I have reviewed all pertinent imaging results/findings within the past 24 hours.

## 2018-08-10 ENCOUNTER — ANESTHESIA (OUTPATIENT)
Dept: SURGERY | Facility: HOSPITAL | Age: 68
DRG: 857 | End: 2018-08-10
Payer: MEDICARE

## 2018-08-10 LAB
POCT GLUCOSE: 129 MG/DL (ref 70–110)
POCT GLUCOSE: 141 MG/DL (ref 70–110)
POCT GLUCOSE: 176 MG/DL (ref 70–110)
POCT GLUCOSE: 324 MG/DL (ref 70–110)

## 2018-08-10 PROCEDURE — 63600175 PHARM REV CODE 636 W HCPCS: Performed by: ANESTHESIOLOGY

## 2018-08-10 PROCEDURE — 87075 CULTR BACTERIA EXCEPT BLOOD: CPT

## 2018-08-10 PROCEDURE — 63600175 PHARM REV CODE 636 W HCPCS: Performed by: STUDENT IN AN ORGANIZED HEALTH CARE EDUCATION/TRAINING PROGRAM

## 2018-08-10 PROCEDURE — 71000033 HC RECOVERY, INTIAL HOUR: Performed by: SURGERY

## 2018-08-10 PROCEDURE — 87077 CULTURE AEROBIC IDENTIFY: CPT

## 2018-08-10 PROCEDURE — 36000705 HC OR TIME LEV I EA ADD 15 MIN: Performed by: SURGERY

## 2018-08-10 PROCEDURE — 71000039 HC RECOVERY, EACH ADD'L HOUR: Performed by: SURGERY

## 2018-08-10 PROCEDURE — 87186 SC STD MICRODIL/AGAR DIL: CPT

## 2018-08-10 PROCEDURE — 36000704 HC OR TIME LEV I 1ST 15 MIN: Performed by: SURGERY

## 2018-08-10 PROCEDURE — 87176 TISSUE HOMOGENIZATION CULTR: CPT

## 2018-08-10 PROCEDURE — 37000008 HC ANESTHESIA 1ST 15 MINUTES: Performed by: SURGERY

## 2018-08-10 PROCEDURE — 25000003 PHARM REV CODE 250: Performed by: ANESTHESIOLOGY

## 2018-08-10 PROCEDURE — 25000003 PHARM REV CODE 250: Performed by: STUDENT IN AN ORGANIZED HEALTH CARE EDUCATION/TRAINING PROGRAM

## 2018-08-10 PROCEDURE — 87070 CULTURE OTHR SPECIMN AEROBIC: CPT

## 2018-08-10 PROCEDURE — 0JB80ZZ EXCISION OF ABDOMEN SUBCUTANEOUS TISSUE AND FASCIA, OPEN APPROACH: ICD-10-PCS | Performed by: SURGERY

## 2018-08-10 PROCEDURE — 63600175 PHARM REV CODE 636 W HCPCS: Performed by: NURSE ANESTHETIST, CERTIFIED REGISTERED

## 2018-08-10 PROCEDURE — 11000001 HC ACUTE MED/SURG PRIVATE ROOM

## 2018-08-10 PROCEDURE — 88305 TISSUE EXAM BY PATHOLOGIST: CPT | Mod: 26,,, | Performed by: PATHOLOGY

## 2018-08-10 PROCEDURE — 63600175 PHARM REV CODE 636 W HCPCS: Performed by: SURGERY

## 2018-08-10 PROCEDURE — 87102 FUNGUS ISOLATION CULTURE: CPT

## 2018-08-10 PROCEDURE — 97802 MEDICAL NUTRITION INDIV IN: CPT

## 2018-08-10 PROCEDURE — 25000003 PHARM REV CODE 250: Performed by: SURGERY

## 2018-08-10 PROCEDURE — 94761 N-INVAS EAR/PLS OXIMETRY MLT: CPT

## 2018-08-10 PROCEDURE — 37000009 HC ANESTHESIA EA ADD 15 MINS: Performed by: SURGERY

## 2018-08-10 PROCEDURE — 87076 CULTURE ANAEROBE IDENT EACH: CPT

## 2018-08-10 PROCEDURE — 88305 TISSUE EXAM BY PATHOLOGIST: CPT | Performed by: PATHOLOGY

## 2018-08-10 PROCEDURE — 25000003 PHARM REV CODE 250: Performed by: NURSE ANESTHETIST, CERTIFIED REGISTERED

## 2018-08-10 RX ORDER — BACITRACIN 50000 [IU]/1
INJECTION, POWDER, FOR SOLUTION INTRAMUSCULAR
Status: DISCONTINUED | OUTPATIENT
Start: 2018-08-10 | End: 2018-08-10 | Stop reason: HOSPADM

## 2018-08-10 RX ORDER — PROPOFOL 10 MG/ML
VIAL (ML) INTRAVENOUS
Status: DISCONTINUED | OUTPATIENT
Start: 2018-08-10 | End: 2018-08-10

## 2018-08-10 RX ORDER — FENTANYL CITRATE 50 UG/ML
INJECTION, SOLUTION INTRAMUSCULAR; INTRAVENOUS
Status: DISCONTINUED | OUTPATIENT
Start: 2018-08-10 | End: 2018-08-10

## 2018-08-10 RX ORDER — DEXAMETHASONE SODIUM PHOSPHATE 4 MG/ML
INJECTION, SOLUTION INTRA-ARTICULAR; INTRALESIONAL; INTRAMUSCULAR; INTRAVENOUS; SOFT TISSUE
Status: DISCONTINUED | OUTPATIENT
Start: 2018-08-10 | End: 2018-08-10

## 2018-08-10 RX ORDER — GLYCOPYRROLATE 0.2 MG/ML
INJECTION INTRAMUSCULAR; INTRAVENOUS
Status: DISCONTINUED | OUTPATIENT
Start: 2018-08-10 | End: 2018-08-10

## 2018-08-10 RX ORDER — SODIUM CHLORIDE 9 MG/ML
INJECTION, SOLUTION INTRAVENOUS CONTINUOUS
Status: DISCONTINUED | OUTPATIENT
Start: 2018-08-10 | End: 2018-08-11

## 2018-08-10 RX ORDER — ONDANSETRON 2 MG/ML
4 INJECTION INTRAMUSCULAR; INTRAVENOUS DAILY PRN
Status: COMPLETED | OUTPATIENT
Start: 2018-08-10 | End: 2018-08-11

## 2018-08-10 RX ORDER — NEOSTIGMINE METHYLSULFATE 1 MG/ML
INJECTION, SOLUTION INTRAVENOUS
Status: DISCONTINUED | OUTPATIENT
Start: 2018-08-10 | End: 2018-08-10

## 2018-08-10 RX ORDER — ONDANSETRON 2 MG/ML
INJECTION INTRAMUSCULAR; INTRAVENOUS
Status: DISCONTINUED | OUTPATIENT
Start: 2018-08-10 | End: 2018-08-10

## 2018-08-10 RX ORDER — SODIUM CHLORIDE 9 MG/ML
INJECTION, SOLUTION INTRAVENOUS CONTINUOUS PRN
Status: DISCONTINUED | OUTPATIENT
Start: 2018-08-10 | End: 2018-08-10

## 2018-08-10 RX ORDER — CEFAZOLIN SODIUM 1 G/3ML
INJECTION, POWDER, FOR SOLUTION INTRAMUSCULAR; INTRAVENOUS
Status: DISCONTINUED | OUTPATIENT
Start: 2018-08-10 | End: 2018-08-10

## 2018-08-10 RX ORDER — HYDROMORPHONE HYDROCHLORIDE 2 MG/ML
0.5 INJECTION, SOLUTION INTRAMUSCULAR; INTRAVENOUS; SUBCUTANEOUS EVERY 5 MIN PRN
Status: DISCONTINUED | OUTPATIENT
Start: 2018-08-10 | End: 2018-08-10

## 2018-08-10 RX ORDER — MIDAZOLAM HYDROCHLORIDE 1 MG/ML
INJECTION, SOLUTION INTRAMUSCULAR; INTRAVENOUS
Status: DISCONTINUED | OUTPATIENT
Start: 2018-08-10 | End: 2018-08-10

## 2018-08-10 RX ORDER — SUCCINYLCHOLINE CHLORIDE 20 MG/ML
INJECTION INTRAMUSCULAR; INTRAVENOUS
Status: DISCONTINUED | OUTPATIENT
Start: 2018-08-10 | End: 2018-08-10

## 2018-08-10 RX ORDER — LIDOCAINE HYDROCHLORIDE 10 MG/ML
INJECTION, SOLUTION EPIDURAL; INFILTRATION; INTRACAUDAL; PERINEURAL
Status: DISCONTINUED | OUTPATIENT
Start: 2018-08-10 | End: 2018-08-10 | Stop reason: HOSPADM

## 2018-08-10 RX ORDER — OXYCODONE HYDROCHLORIDE 5 MG/1
5 TABLET ORAL
Status: DISCONTINUED | OUTPATIENT
Start: 2018-08-10 | End: 2018-08-10

## 2018-08-10 RX ORDER — CEFAZOLIN SODIUM 2 G/50ML
2 SOLUTION INTRAVENOUS
Status: DISCONTINUED | OUTPATIENT
Start: 2018-08-10 | End: 2018-08-12

## 2018-08-10 RX ORDER — ROCURONIUM BROMIDE 10 MG/ML
INJECTION, SOLUTION INTRAVENOUS
Status: DISCONTINUED | OUTPATIENT
Start: 2018-08-10 | End: 2018-08-10

## 2018-08-10 RX ORDER — LIDOCAINE HCL/PF 100 MG/5ML
SYRINGE (ML) INTRAVENOUS
Status: DISCONTINUED | OUTPATIENT
Start: 2018-08-10 | End: 2018-08-10

## 2018-08-10 RX ADMIN — ONDANSETRON 4 MG: 2 INJECTION INTRAMUSCULAR; INTRAVENOUS at 09:08

## 2018-08-10 RX ADMIN — LIDOCAINE HYDROCHLORIDE 80 MG: 20 INJECTION, SOLUTION INTRAVENOUS at 12:08

## 2018-08-10 RX ADMIN — SODIUM CHLORIDE: 0.9 INJECTION, SOLUTION INTRAVENOUS at 02:08

## 2018-08-10 RX ADMIN — FENTANYL CITRATE 100 MCG: 50 INJECTION, SOLUTION INTRAMUSCULAR; INTRAVENOUS at 12:08

## 2018-08-10 RX ADMIN — OXYCODONE HYDROCHLORIDE 5 MG: 5 TABLET ORAL at 09:08

## 2018-08-10 RX ADMIN — LOSARTAN POTASSIUM 100 MG: 50 TABLET, FILM COATED ORAL at 09:08

## 2018-08-10 RX ADMIN — NEOSTIGMINE METHYLSULFATE 4 MG: 1 INJECTION INTRAVENOUS at 01:08

## 2018-08-10 RX ADMIN — HYDROMORPHONE HYDROCHLORIDE 0.5 MG: 2 INJECTION INTRAMUSCULAR; INTRAVENOUS; SUBCUTANEOUS at 02:08

## 2018-08-10 RX ADMIN — CYANOCOBALAMIN 100 MCG: 1000 INJECTION, SOLUTION INTRAMUSCULAR; SUBCUTANEOUS at 09:08

## 2018-08-10 RX ADMIN — PIPERACILLIN AND TAZOBACTAM 4.5 G: 4; .5 INJECTION, POWDER, LYOPHILIZED, FOR SOLUTION INTRAVENOUS; PARENTERAL at 06:08

## 2018-08-10 RX ADMIN — PIPERACILLIN AND TAZOBACTAM 4.5 G: 4; .5 INJECTION, POWDER, LYOPHILIZED, FOR SOLUTION INTRAVENOUS; PARENTERAL at 11:08

## 2018-08-10 RX ADMIN — ROCURONIUM BROMIDE 10 MG: 10 INJECTION, SOLUTION INTRAVENOUS at 01:08

## 2018-08-10 RX ADMIN — HYDROCODONE BITARTRATE AND ACETAMINOPHEN 1 TABLET: 5; 325 TABLET ORAL at 06:08

## 2018-08-10 RX ADMIN — PROPOFOL 30 MG: 10 INJECTION, EMULSION INTRAVENOUS at 01:08

## 2018-08-10 RX ADMIN — ROCURONIUM BROMIDE 5 MG: 10 INJECTION, SOLUTION INTRAVENOUS at 12:08

## 2018-08-10 RX ADMIN — Medication 125 MG: at 09:08

## 2018-08-10 RX ADMIN — MIDAZOLAM 1 MG: 1 INJECTION INTRAMUSCULAR; INTRAVENOUS at 12:08

## 2018-08-10 RX ADMIN — CEFAZOLIN 2 G: 330 INJECTION, POWDER, FOR SOLUTION INTRAMUSCULAR; INTRAVENOUS at 01:08

## 2018-08-10 RX ADMIN — SUCCINYLCHOLINE CHLORIDE 120 MG: 20 INJECTION, SOLUTION INTRAMUSCULAR; INTRAVENOUS at 12:08

## 2018-08-10 RX ADMIN — PROPOFOL 100 MG: 10 INJECTION, EMULSION INTRAVENOUS at 12:08

## 2018-08-10 RX ADMIN — SODIUM CHLORIDE: 0.9 INJECTION, SOLUTION INTRAVENOUS at 12:08

## 2018-08-10 RX ADMIN — DEXAMETHASONE SODIUM PHOSPHATE 8 MG: 4 INJECTION, SOLUTION INTRAMUSCULAR; INTRAVENOUS at 01:08

## 2018-08-10 RX ADMIN — INSULIN ASPART 2 UNITS: 100 INJECTION, SOLUTION INTRAVENOUS; SUBCUTANEOUS at 09:08

## 2018-08-10 RX ADMIN — GLYCOPYRROLATE 0.4 MG: 0.2 INJECTION, SOLUTION INTRAMUSCULAR; INTRAVENOUS at 01:08

## 2018-08-10 RX ADMIN — ONDANSETRON 8 MG: 2 INJECTION, SOLUTION INTRAMUSCULAR; INTRAVENOUS at 01:08

## 2018-08-10 RX ADMIN — HYDROCODONE BITARTRATE AND ACETAMINOPHEN 1 TABLET: 5; 325 TABLET ORAL at 11:08

## 2018-08-10 NOTE — PLAN OF CARE
Problem: Patient Care Overview  Goal: Individualization & Mutuality  Outcome: Ongoing (interventions implemented as appropriate)  Patient safety maintained. Pain controlled with pain meds. NPO maintained after midnight.  Patient sleep and rested over the night. No N&V,  No diarrhea. No distress noted. Will conitnue to monitor.

## 2018-08-10 NOTE — PROGRESS NOTES
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 But plan now will to operate on the wound right now only and drain possible abscess  ---- Would recommend to obtain intra-operative cultures if possible            Anticipated Disposition:     Thank you for your consult. I will follow-up with patient. Please contact us if you have any additional questions.    Bob Zarate MD   [employer] Infectious Disease Fellow, PGY4  ID pager [phone]  Infectious Disease  [facility]    Subjective:     Principal Problem:Abdominal wall abscess    HPI: This is a very pleasant 67 YO F from [place] who has a PMH of HTN, DM, kidney stones, recurrent diverticulitis that led to a 80% hemicolectomy in 2015. According to the patient she tells me that she was operated in later in [facility] and had a colostomy then. Later was seen bu Dr Alfaro and had a reversal of colostomy and closure in 11/2016 and other compliacted surgerires for fistula and incarcerated ventral hernia. According to the patient she had total 5 surgeries after the first surgery in [place].  According to the patient she had no abdominal wound infection till now but she did mention that she took possibly augmentin and bactrim for one year before this.  As note after reviewing records the wound culture from 7/11/2018 was growing Ecoli and according to her home meds she was prescribed bactrim on  7/18/2018. Not sure if she took that or not.  This last Friday she started having 2-3 intensity dull abdominal discomfort with severe N/V and abdominal wound serosangious discharge. Denied any chills, no fevers and she was admitted to [facility] on 8/2/2018.  She has been on Pip/Tazo since 8/2/2018 and wound cx collected on 8/2/2018 is revealing Ecoli and ESBL Klebsiella now. ID consult called 8/6/2018 for choice of antibiotics.  Interval History: Patient seen today, stated pain there but better, no N//D    Review of Systems   Constitutional: Negative.    HENT: Negative for congestion, dental problem, ear discharge and facial swelling.    Eyes: Negative for pain, discharge and itching.   Respiratory: Negative for apnea, choking and chest tightness.    Cardiovascular: Negative for chest pain and palpitations.   Gastrointestinal: Positive for abdominal pain. Negative for constipation, diarrhea, nausea and vomiting.   Genitourinary: Negative for difficulty urinating and dyspareunia.   Musculoskeletal: Negative for arthralgias, gait problem and myalgias.   Skin: Positive for wound. Negative for color change and pallor.        Skin with abdominal wound   Neurological: Negative for dizziness, facial asymmetry and headaches.   Psychiatric/Behavioral: Negative for agitation, behavioral problems and confusion.   All other systems reviewed and are negative.    Objective:     Vital Signs (Most Recent):  Temp: 99.4 °F (37.4 °C) (08/09/18 1655)  Pulse: 87 (08/09/18 1655)  Resp: 19 (08/09/18 1655)  BP: (!) 157/85 (08/09/18 1655)  SpO2: 95 % (08/09/18 1719) Vital Signs (24h Range):  Temp:  [97.3 °F (36.3 °C)-99.4 °F (37.4 °C)] 99.4 °F (37.4 °C)  Pulse:  [77-88] 87  Resp:  [17-20] 19  SpO2:  [94 %-96 %] 95 %  BP: (130-157)/(76-85) 157/85     Weight: 77.1 kg (169 lb 15.6 oz)  Body mass index is 34.33 kg/m².    Estimated Creatinine Clearance: 60.4 mL/min (based on SCr of 1.1 mg/dL).    Physical Exam   Constitutional: She is oriented to  person, place, and time. She appears well-developed and well-nourished.   HENT:   Head: Normocephalic and atraumatic.   Eyes: EOM are normal. Pupils are equal, round, and reactive to light.   Neck: Normal range of motion. Neck supple. No JVD present. No tracheal deviation present.   Cardiovascular: Normal rate, regular rhythm, normal heart sounds and intact distal pulses.  Exam reveals no friction rub.    No murmur heard.  Pulmonary/Chest: Effort normal and breath sounds normal. No respiratory distress. She has no wheezes. She has no rales.   Abdominal: Soft. Bowel sounds are normal. She exhibits no distension. There is tenderness.   + wound on abdomen midline with serosangious and yellow discharge 2x2 cm   Musculoskeletal: Normal range of motion. She exhibits no edema, tenderness or deformity.   Lymphadenopathy:     She has no cervical adenopathy.   Neurological: She is alert and oriented to person, place, and time. No cranial nerve deficit.   Skin: Skin is warm.   + wound please see abdominal exam   Psychiatric: She has a normal mood and affect. Her behavior is normal. Thought content normal.   Nursing note and vitals reviewed.      Significant Labs:   Blood Culture:   Recent Labs  Lab 08/02/18 1918 08/02/18 1948   LABBLOO No growth after 5 days. No growth after 5 days.     BMP:   Recent Labs  Lab 08/08/18  1024   *      K 4.1      CO2 26   BUN 12   CREATININE 1.1   CALCIUM 9.6     Wound Culture:   Recent Labs  Lab 02/28/18  0910 04/18/18  0920 07/11/18  0930 08/02/18 1918   LABAERO STAPHYLOCOCCUS AUREUSModerate  ESCHERICHIA COLIFew ESCHERICHIA COLIMany ESCHERICHIA COLIModerate  ESCHERICHIA COLIFew ESCHERICHIA COLIMany  KLEBSIELLA OXYTOCA ESBLModerate     All pertinent labs within the past 24 hours have been reviewed.    Significant Imaging: I have reviewed all pertinent imaging results/findings within the past 24 hours.

## 2018-08-10 NOTE — PLAN OF CARE
08/10/18 1611   PRE-TX-O2-ETCO2   O2 Device (Oxygen Therapy) room air   SpO2 98 %   $ Pulse Oximetry - Multiple Charge Pulse Oximetry - Multiple

## 2018-08-10 NOTE — OP NOTE
Operative Note       Surgery Date: 8/10/2018     Surgeon(s) and Role:     * Ulisses Horton MD - Primary    Pre-op Diagnosis:  Fistula of intestine, excluding rectum and anus [K63.2]  Hyponatremia [E87.1]  Tachycardia [R00.0]  Abdominal wall abscess [L02.211]  S/P exploratory laparotomy [Z98.890]  Diabetes with skin complication [E11.628]  Colonic fistula [K63.2]  Essential hypertension [I10]  Drainage from wound [T14.8XXA]    Post-op Diagnosis: Post-Op Diagnosis Codes:     * Fistula of intestine, excluding rectum and anus [K63.2]     * Hyponatremia [E87.1]     * Tachycardia [R00.0]     * Abdominal wall abscess [L02.211]     * S/P exploratory laparotomy [Z98.890]     * Diabetes with skin complication [E11.628]     * Colonic fistula [K63.2]     * Essential hypertension [I10]     * Drainage from wound [T14.8XXA]    Procedure(s) (LRB):  INCISION, ABDOMINAL WALL (N/A)  Wound exploration ,excision and debridement of abdominal wound  Anesthesia: General    Procedure in Detail/Findings:  Wound exploration with excision and debridement of abdominal wound done under general anaesthesia , patient tolerated well  Wound packed with iodoform packing . Patient tolerated well and was sent to recovery room in stable condition    Estimated Blood Loss: 60 cc         Specimens     Infected tissue        Implants: * No implants in log *           Disposition: PACU - hemodynamically stable.           Condition: Good    Attestation:  I was present and scrubbed for the entire procedure.           Discharge Note    Admit Date: 8/2/2018    Attending Physician: Ulisses Horton MD     Discharge Physician: Ulisses Horton MD    Final Diagnosis: Post-Op Diagnosis Codes:     * Fistula of intestine, excluding rectum and anus [K63.2]     * Hyponatremia [E87.1]     * Tachycardia [R00.0]     * Abdominal wall abscess [L02.211]     * S/P exploratory laparotomy [Z98.890]     * Diabetes with skin complication [E11.628]     * Colonic  fistula [K63.2]     * Essential hypertension [I10]     * Drainage from wound [T14.8XXA]    Disposition: Still a Patient    Patient Instructions:   Current Discharge Medication List      CONTINUE these medications which have NOT CHANGED    Details   acetaminophen (TYLENOL) 325 MG tablet Take 2 tablets (650 mg total) by mouth every 8 (eight) hours as needed.  Refills: 0      hydroCHLOROthiazide (HYDRODIURIL) 25 MG tablet       HYDROcodone-acetaminophen (NORCO) 5-325 mg per tablet Take 1 tablet by mouth every 6 (six) hours as needed.  Qty: 24 tablet, Refills: 0      LANTUS SOLOSTAR U-100 INSULIN 100 unit/mL (3 mL) InPn pen       losartan (COZAAR) 100 MG tablet Take 100 mg by mouth once daily.       !! metFORMIN (GLUCOPHAGE) 1000 MG tablet       !! metformin (GLUCOPHAGE) 500 MG tablet Take 1,000 mg by mouth 2 (two) times daily with meals.      !! ondansetron (ZOFRAN) 4 MG tablet Take 2 tablets (8 mg total) by mouth every 8 (eight) hours as needed for Nausea.  Qty: 14 tablet, Refills: 0      !! ondansetron (ZOFRAN) 8 MG tablet       !! ondansetron (ZOFRAN) 8 MG tablet Take 1 tablet (8 mg total) by mouth every 8 (eight) hours as needed for Nausea.  Qty: 32 tablet, Refills: 1      sulfamethoxazole-trimethoprim 800-160mg (BACTRIM DS) 800-160 mg Tab Take 1 tablet by mouth 2 (two) times daily.  Qty: 60 tablet, Refills: 2      TRUEPLUS LANCETS 26 gauge Misc       TRUETEST TEST STRIPS Strp       mupirocin (BACTROBAN) 2 % ointment       promethazine (PHENERGAN) 6.25 mg/5 mL syrup       traMADol (ULTRAM) 50 mg tablet TAKE 1 TABLET BY MOUTH EVERY 6 HOURS  Qty: 24 tablet, Refills: 0       !! - Potential duplicate medications found. Please discuss with provider.          Patient tolerated well and was sent to recovery room in stable condition.

## 2018-08-10 NOTE — ANESTHESIA POSTPROCEDURE EVALUATION
"Anesthesia Post Evaluation    Patient: Azucena Morrison    Procedure(s) Performed: Procedure(s) (LRB):  INCISION, ABDOMINAL WALL (N/A)    Final Anesthesia Type: general  Patient location during evaluation: PACU  Patient participation: Yes- Able to Participate  Level of consciousness: awake and alert  Post-procedure vital signs: reviewed and stable  Pain management: adequate  Airway patency: patent  PONV status at discharge: No PONV  Anesthetic complications: no      Cardiovascular status: blood pressure returned to baseline  Respiratory status: unassisted  Hydration status: euvolemic          Visit Vitals  /73   Pulse 76   Temp 36.1 °C (96.9 °F) (Oral)   Resp 15   Ht 4' 11" (1.499 m)   Wt 77.1 kg (169 lb 15.6 oz)   SpO2 100%   Breastfeeding? No   BMI 34.33 kg/m²       Pain/Patience Score: Pain Assessment Performed: Yes (8/10/2018  1:55 PM)  Presence of Pain: non-verbal indicators absent (8/10/2018  1:55 PM)  Pain Rating Prior to Med Admin: 7 (8/10/2018  2:15 PM)  Pain Rating Post Med Admin: 0 (8/10/2018  7:36 AM)  Patience Score: 8 (8/10/2018  1:55 PM)      "

## 2018-08-10 NOTE — SUBJECTIVE & OBJECTIVE
Interval History: pt seen this AM BEFORE THE PROCEDURE, was anxiously waiting, c/o mild abdominal discomfort  Review of Systems   Constitutional: Negative.    HENT: Negative for congestion, dental problem, ear discharge and facial swelling.    Eyes: Negative for pain, discharge and itching.   Respiratory: Negative for apnea, choking and chest tightness.    Cardiovascular: Negative for chest pain and palpitations.   Gastrointestinal: Positive for abdominal pain. Negative for constipation, diarrhea, nausea and vomiting.   Genitourinary: Negative for difficulty urinating and dyspareunia.   Musculoskeletal: Negative for arthralgias, gait problem and myalgias.   Skin: Positive for wound. Negative for color change and pallor.        Skin with abdominal wound   Neurological: Negative for dizziness, facial asymmetry and headaches.   Psychiatric/Behavioral: Negative for agitation, behavioral problems and confusion.   All other systems reviewed and are negative.    Objective:     Vital Signs (Most Recent):  Temp: 97.4 °F (36.3 °C) (08/10/18 1559)  Pulse: 83 (08/10/18 1559)  Resp: 16 (08/10/18 1559)  BP: (!) 147/67 (08/10/18 1559)  SpO2: 98 % (08/10/18 1611) Vital Signs (24h Range):  Temp:  [96.6 °F (35.9 °C)-98.6 °F (37 °C)] 97.4 °F (36.3 °C)  Pulse:  [70-88] 83  Resp:  [15-20] 16  SpO2:  [92 %-100 %] 98 %  BP: (117-147)/(67-82) 147/67     Weight: 77.1 kg (169 lb 15.6 oz)  Body mass index is 34.33 kg/m².    Estimated Creatinine Clearance: 60.4 mL/min (based on SCr of 1.1 mg/dL).    Physical Exam   Constitutional: She is oriented to person, place, and time. She appears well-developed and well-nourished.   HENT:   Head: Normocephalic and atraumatic.   Eyes: EOM are normal. Pupils are equal, round, and reactive to light.   Neck: Normal range of motion. Neck supple. No JVD present. No tracheal deviation present.   Cardiovascular: Normal rate, regular rhythm, normal heart sounds and intact distal pulses.  Exam reveals no friction  rub.    No murmur heard.  Pulmonary/Chest: Effort normal and breath sounds normal. No respiratory distress. She has no wheezes. She has no rales.   Abdominal: Soft. Bowel sounds are normal. She exhibits no distension. There is tenderness.   + wound on abdomen midline with serosangious and yellow discharge 2x2 cm   Musculoskeletal: Normal range of motion. She exhibits no edema, tenderness or deformity.   Lymphadenopathy:     She has no cervical adenopathy.   Neurological: She is alert and oriented to person, place, and time. No cranial nerve deficit.   Skin: Skin is warm.   + wound please see abdominal exam   Psychiatric: She has a normal mood and affect. Her behavior is normal. Thought content normal.   Nursing note and vitals reviewed.      Significant Labs:   Blood Culture:   Recent Labs  Lab 08/02/18 1918 08/02/18 1948   LABBLOO No growth after 5 days. No growth after 5 days.     BMP: No results for input(s): GLU, NA, K, CL, CO2, BUN, CREATININE, CALCIUM, MG in the last 48 hours.  CBC: No results for input(s): WBC, HGB, HCT, PLT in the last 48 hours.  Wound Culture:   Recent Labs  Lab 02/28/18  0910 04/18/18  0920 07/11/18  0930 08/02/18 1918   LABAERO STAPHYLOCOCCUS AUREUSModerate  ESCHERICHIA COLIFew ESCHERICHIA COLIMany ESCHERICHIA COLIModerate  ESCHERICHIA COLIFew ESCHERICHIA COLIMany  KLEBSIELLA OXYTOCA ESBLModerate     All pertinent labs within the past 24 hours have been reviewed.    Significant Imaging: I have reviewed all pertinent imaging results/findings within the past 24 hours.

## 2018-08-10 NOTE — ANESTHESIA RELEASE NOTE
"Anesthesia Release from PACU Note    Patient: Azucena Morrison    Procedure(s) Performed: Procedure(s) (LRB):  INCISION, ABDOMINAL WALL (N/A)    Anesthesia type: general    Post pain: Adequate analgesia    Post assessment: no apparent anesthetic complications    Last Vitals:   Visit Vitals  /73   Pulse 76   Temp 36.1 °C (96.9 °F) (Oral)   Resp 15   Ht 4' 11" (1.499 m)   Wt 77.1 kg (169 lb 15.6 oz)   SpO2 100%   Breastfeeding? No   BMI 34.33 kg/m²       Post vital signs: stable    Level of consciousness: awake, alert  and oriented    Nausea/Vomiting: no nausea/no vomiting    Complications: none    Airway Patency: patent    Respiratory: unassisted    Cardiovascular: stable and blood pressure at baseline    Hydration: euvolemic  "

## 2018-08-10 NOTE — ANESTHESIA PREPROCEDURE EVALUATION
08/09/2018  Azucena Morrison is a 68 y.o., female with abdominal wall abscess    History of hemicolectomy   Status post Aiyana's procedure   HTN (hypertension)   Type 2 diabetes mellitus without complication   Suture granuloma   Essential hypertension   Drainage from wound   S/P colostomy   Fistula of intestine, excluding rectum and anus   S/P exploratory laparotomy   Tachycardia   Abscess of abdominal wall   Type 2 diabetes mellitus with skin complication   Diabetes mellitus   Unspecified local infection of skin and subcutaneous tissue   Diabetes with skin complication   Generalized abdominal pain   LLQ pain       Pre-op Assessment      I have reviewed the Medications.     Review of Systems  Anesthesia Hx:  No problems with previous Anesthesia Denies Hx of Anesthetic complications  History of prior surgery of interest to airway management or planning: Previous anesthesia: General Denies Family Hx of Anesthesia complications.   Denies Personal Hx of Anesthesia complications.   Social:  Non-Smoker, No Alcohol Use    Cardiovascular:   Hypertension    Renal/:   renal calculi    Neurological:  Neurology Normal    Endocrine:   Diabetes        Physical Exam  General:  Well nourished    Airway/Jaw/Neck:  Airway Findings: Mouth Opening: Normal Tongue: Normal  General Airway Assessment: Adult  Mallampati: II      Dental:  Dental Findings: Upper Dentures   Chest/Lungs:  Chest/Lungs Findings: Clear to auscultation, Normal Respiratory Rate     Heart/Vascular:  Heart Findings: Rate: Normal  Rhythm: Regular Rhythm        Mental Status:  Mental Status Findings:  Cooperative, Alert and Oriented       Lab Results   Component Value Date    WBC 13.05 (H) 08/08/2018    HGB 9.6 (L) 08/08/2018    HCT 30.3 (L) 08/08/2018     (H) 08/08/2018    CHOL 291 (H) 06/11/2008    TRIG 1,512 (H) 06/11/2008    HDL 23 (L) 06/11/2008     ALT 15 08/08/2018    AST 12 08/08/2018     08/08/2018    K 4.1 08/08/2018     08/08/2018    CREATININE 1.1 08/08/2018    BUN 12 08/08/2018    CO2 26 08/08/2018    TSH 1.70 06/11/2008    INR 1.1 11/14/2016    HGBA1C 7.9 (H) 08/03/2018       Anesthesia Plan  Type of Anesthesia, risks & benefits discussed:  Anesthesia Type:  general  Patient's Preference: general  Intra-op Monitoring Plan:   Intra-op Monitoring Plan Comments:   Post Op Pain Control Plan:   Post Op Pain Control Plan Comments:   Induction:   IV  Beta Blocker:         Informed Consent: Patient understands risks and agrees with Anesthesia plan.  Questions answered. Anesthesia consent signed with patient.  ASA Score: 2     Day of Surgery Review of History & Physical:        Anesthesia Plan Notes: Pt is Syriac speaking         Ready For Surgery From Anesthesia Perspective.

## 2018-08-10 NOTE — PROGRESS NOTES
[facility] Medicine Progress Note    Primary Team: [facility] Hospitalist Team B  Attending Physician: Dr. Khoury/Elizabet  Resident: Ranjit Braun  Intern: Naseem Martinez    Subjective:      Patient resting comfortably this morning. No acute events overnight. Easily aroused, awake and alert.  Continues to endorse 3/10 abdominal pain located umbilicus area. Controlled with pain meds.  Dressing with minimal serosang drainage, no surrounding erythema.     Denies CP, SOB. Stooling and voiding appropriately. NPO for procedure today. Denies N/V/D. Denies fever, chills.      Objective:     Last 24 Hour Vital Signs:  BP  Min: 125/69  Max: 157/85  Temp  Av.9 °F (37.2 °C)  Min: 98.2 °F (36.8 °C)  Max: 99.4 °F (37.4 °C)  Pulse  Av.7  Min: 75  Max: 88  Resp  Av.4  Min: 17  Max: 20  SpO2  Av %  Min: 94 %  Max: 96 %  I/O last 3 completed shifts:  In: 1560 [P.O.:960; IV Piggyback:600]  Out: 0     Physical Examination:  General: Patient sitting up in bed in NAD  CV: RRR with no rubs murmurs or s3 or s4  Resp: CTAB with no wheezes crackles or rhonchi  Abdomen: Soft, NT, ND. Dressing covering lower abdomen intact minimal serosang drainage, no sign of surrounding erythema  Extremities- SCDs and bobbi hose in place, distal pulses equal and intact  Neuro: aax3    Laboratory:  Laboratory Data Reviewed: yes  Pertinent Findings:  POCT 136  Fe: 87, TIBC: 332, Sat Fe: 26; Transferrin: 224, Ferritin: 97, Folate: 10, B12: 163    Microbiology Data Reviewed: yes  Pertinent Findings:  Culture from  grew EColi and Klebsiella   New Cultures show NG      Radiology Data Reviewed: yes  Pertinent Findings:  No new Radiology studies.    Current Medications:     Infusions:   sodium chloride 0.9% 70 mL/hr at 08/10/18 0210        Scheduled:   cyanocobalamin  100 mcg Intramuscular Daily    losartan  100 mg Oral Daily    piperacillin-tazobactam (ZOSYN) IVPB  4.5 g Intravenous Q8H    scopolamine  1 patch Transdermal Q3 Days         PRN:  ceFAZolin (ANCEF) IVPB, dextrose 50%, dextrose 50%, glucagon (human recombinant), glucose, glucose, HYDROcodone-acetaminophen, insulin aspart U-100, ondansetron, simethicone    Antibiotics and Day Number of Therapy:  Zosyn on 8/2    Lines and Day Number of Therapy:  Peripheral IV: L forearm 2d    Assessment:     Azucena Morrison is a 68 y.o.female with  Patient Active Problem List    Diagnosis Date Noted    Abdominal wall abscess 08/02/2018    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    Polyp of colon 06/04/2018    Generalized abdominal pain     LLQ pain     Diabetes with skin complication     Unspecified local infection of skin and subcutaneous tissue     Diabetes mellitus     Type 2 diabetes mellitus with skin complication     Abscess of abdominal wall 07/11/2017    Tachycardia 12/09/2016    S/P exploratory laparotomy     S/P colostomy 11/14/2016    Fistula of intestine, excluding rectum and anus 11/14/2016    Drainage from wound 08/03/2016    Essential hypertension 08/02/2016    History of hemicolectomy 05/17/2016    Status post Aiyana's procedure 05/17/2016    HTN (hypertension) 05/17/2016    Type 2 diabetes mellitus without complication 05/17/2016    Suture granuloma 05/17/2016        Plan:     1) Abdominal wounds; surgical wounds, abdominal pain  - continue wound care and surgical management per primary team  - agree with IV zosyn for abx coverage; abdominal wounds with ESBL E coli, Klebsiella  - pain and anti-emetic management per primary team  - fistulagram showed colonocutaneous fistula   - ID following- recommend continue zosyn and obtain intraoperative cultures  - abd wall abscess to be drained prior to exploration by surgery   - OR Friday for diverting colostomy per surgery      2) Hypertension  - blood pressure currently at 117/69; has been mainly normotensive throughout stay  - cont losartan 100  - adding BP home meds in stepwise fashion as needed     3) diabetes mellitus type II  - hold  metformin while inpatient  - initiated sliding scale in insulin with q 4 accuchecks  -Glucose currently 129  - s/p levemir 10 u     4) microcytic anemia  - H/H 9.4/30.5 on admission, with MCV 77   - anemia panel: Fe: 87, TIBC: 332, Sat Fe: 26; Transferrin: 224, Ferritin: 97, Folate: 10, B12: 163.  - Started on B12 1000mcg daily on 8/4     5) thrombocytosis  - likely reactive due to concurrent wound/infection burden  - continue to monitor  - downtrending 570 -> 366    6) hyponatremia, hypotonic, resolved  - serum osmole, urine osmole, urine Na: 291, 618 and 138 respectively.  - recommend primary team reassess fluid status; patient likely does not need IVF at this time -> Na was downtrending -> now resolved     7) globulin gap  - consider HIV  - Hep A IgM, Hep B Igm, and surface antigen, HEP C Anitgen: all negative.     Nolvia Gusman MD  [facility] Internal Medicine HO-II    [facility] Medicine Hospitalist Pager numbers:   [facility] Hospitalist Medicine Team A (Omero/Cuauhtemoc): [phone]  [facility] Hospitalist Medicine Team B (Elizabet/Peng):  [phone]

## 2018-08-10 NOTE — SUBJECTIVE & OBJECTIVE
Interval History: Patient seen today, stated pain there but better, no N//D    Review of Systems   Constitutional: Negative.    HENT: Negative for congestion, dental problem, ear discharge and facial swelling.    Eyes: Negative for pain, discharge and itching.   Respiratory: Negative for apnea, choking and chest tightness.    Cardiovascular: Negative for chest pain and palpitations.   Gastrointestinal: Positive for abdominal pain. Negative for constipation, diarrhea, nausea and vomiting.   Genitourinary: Negative for difficulty urinating and dyspareunia.   Musculoskeletal: Negative for arthralgias, gait problem and myalgias.   Skin: Positive for wound. Negative for color change and pallor.        Skin with abdominal wound   Neurological: Negative for dizziness, facial asymmetry and headaches.   Psychiatric/Behavioral: Negative for agitation, behavioral problems and confusion.   All other systems reviewed and are negative.    Objective:     Vital Signs (Most Recent):  Temp: 99.4 °F (37.4 °C) (08/09/18 1655)  Pulse: 87 (08/09/18 1655)  Resp: 19 (08/09/18 1655)  BP: (!) 157/85 (08/09/18 1655)  SpO2: 95 % (08/09/18 1719) Vital Signs (24h Range):  Temp:  [97.3 °F (36.3 °C)-99.4 °F (37.4 °C)] 99.4 °F (37.4 °C)  Pulse:  [77-88] 87  Resp:  [17-20] 19  SpO2:  [94 %-96 %] 95 %  BP: (130-157)/(76-85) 157/85     Weight: 77.1 kg (169 lb 15.6 oz)  Body mass index is 34.33 kg/m².    Estimated Creatinine Clearance: 60.4 mL/min (based on SCr of 1.1 mg/dL).    Physical Exam   Constitutional: She is oriented to person, place, and time. She appears well-developed and well-nourished.   HENT:   Head: Normocephalic and atraumatic.   Eyes: EOM are normal. Pupils are equal, round, and reactive to light.   Neck: Normal range of motion. Neck supple. No JVD present. No tracheal deviation present.   Cardiovascular: Normal rate, regular rhythm, normal heart sounds and intact distal pulses.  Exam reveals no friction rub.    No murmur  heard.  Pulmonary/Chest: Effort normal and breath sounds normal. No respiratory distress. She has no wheezes. She has no rales.   Abdominal: Soft. Bowel sounds are normal. She exhibits no distension. There is tenderness.   + wound on abdomen midline with serosangious and yellow discharge 2x2 cm   Musculoskeletal: Normal range of motion. She exhibits no edema, tenderness or deformity.   Lymphadenopathy:     She has no cervical adenopathy.   Neurological: She is alert and oriented to person, place, and time. No cranial nerve deficit.   Skin: Skin is warm.   + wound please see abdominal exam   Psychiatric: She has a normal mood and affect. Her behavior is normal. Thought content normal.   Nursing note and vitals reviewed.      Significant Labs:   Blood Culture:   Recent Labs  Lab 08/02/18 1918 08/02/18 1948   LABBLOO No growth after 5 days. No growth after 5 days.     BMP:   Recent Labs  Lab 08/08/18  1024   *      K 4.1      CO2 26   BUN 12   CREATININE 1.1   CALCIUM 9.6     Wound Culture:   Recent Labs  Lab 02/28/18  0910 04/18/18  0920 07/11/18  0930 08/02/18 1918   LABAERO STAPHYLOCOCCUS AUREUSModerate  ESCHERICHIA COLIFew ESCHERICHIA COLIMany ESCHERICHIA COLIModerate  ESCHERICHIA COLIFew ESCHERICHIA COLIMany  KLEBSIELLA OXYTOCA ESBLModerate     All pertinent labs within the past 24 hours have been reviewed.    Significant Imaging: I have reviewed all pertinent imaging results/findings within the past 24 hours.

## 2018-08-10 NOTE — ASSESSMENT & PLAN NOTE
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  ---- S/P Wound exploration ,excision and debridement of abdominal wound 8/10/2018  ----  Follow results intra-operative cultures that were obtained

## 2018-08-10 NOTE — PROGRESS NOTES
Lower abdomin clipped . Pt offered chlorhexidene gluconate bath. Pt refused. Pt said she is allergic to chlorhexidene gluconate. Called Dr. Horton ( [phone]) to see what can be done. The doctor said he would like to wait and see.  Will continue to monitor.

## 2018-08-10 NOTE — PROGRESS NOTES
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  ---- S/P Wound exploration ,excision and debridement of abdominal wound 8/10/2018  ----  Follow results intra-operative cultures that were obtained            Anticipated Disposition:    Thank you for your consult. I will follow-up with patient. Please contact us if you have any additional questions.    Bob Zarate MD   [employer] Infectious Disease Fellow, PGY4  ID pager [phone]  Infectious Disease  [facility]    Subjective:     Principal Problem:Abdominal wall abscess    HPI: This is a very pleasant 69 YO F from [place] who has a PMH of HTN, DM, kidney stones, recurrent diverticulitis that led to a 80% hemicolectomy in 2015. According to the patient she tells me that she was operated in later in [facility] and had a colostomy then. Later was seen bu Dr Alfaro and had a reversal of colostomy and closure in 11/2016 and other compliacted surgerires for fistula and incarcerated ventral hernia. According to the patient she had total 5 surgeries after the first surgery in [place].  According to the patient she had no abdominal wound infection till now but she did mention that she took possibly augmentin and bactrim for one year before this.  As note after reviewing records the wound culture from 7/11/2018 was growing Ecoli and according to her home meds she was  prescribed bactrim on 7/18/2018. Not sure if she took that or not.  This last Friday she started having 2-3 intensity dull abdominal discomfort with severe N/V and abdominal wound serosangious discharge. Denied any chills, no fevers and she was admitted to [facility] on 8/2/2018.  She has been on Pip/Tazo since 8/2/2018 and wound cx collected on 8/2/2018 is revealing Ecoli and ESBL Klebsiella now. ID consult called 8/6/2018 for choice of antibiotics.  Interval History: pt seen this AM BEFORE THE PROCEDURE, was anxiously waiting, c/o mild abdominal discomfort  Review of Systems   Constitutional: Negative.    HENT: Negative for congestion, dental problem, ear discharge and facial swelling.    Eyes: Negative for pain, discharge and itching.   Respiratory: Negative for apnea, choking and chest tightness.    Cardiovascular: Negative for chest pain and palpitations.   Gastrointestinal: Positive for abdominal pain. Negative for constipation, diarrhea, nausea and vomiting.   Genitourinary: Negative for difficulty urinating and dyspareunia.   Musculoskeletal: Negative for arthralgias, gait problem and myalgias.   Skin: Positive for wound. Negative for color change and pallor.        Skin with abdominal wound   Neurological: Negative for dizziness, facial asymmetry and headaches.   Psychiatric/Behavioral: Negative for agitation, behavioral problems and confusion.   All other systems reviewed and are negative.    Objective:     Vital Signs (Most Recent):  Temp: 97.4 °F (36.3 °C) (08/10/18 1559)  Pulse: 83 (08/10/18 1559)  Resp: 16 (08/10/18 1559)  BP: (!) 147/67 (08/10/18 1559)  SpO2: 98 % (08/10/18 1611) Vital Signs (24h Range):  Temp:  [96.6 °F (35.9 °C)-98.6 °F (37 °C)] 97.4 °F (36.3 °C)  Pulse:  [70-88] 83  Resp:  [15-20] 16  SpO2:  [92 %-100 %] 98 %  BP: (117-147)/(67-82) 147/67     Weight: 77.1 kg (169 lb 15.6 oz)  Body mass index is 34.33 kg/m².    Estimated Creatinine Clearance: 60.4 mL/min (based on SCr of 1.1  mg/dL).    Physical Exam   Constitutional: She is oriented to person, place, and time. She appears well-developed and well-nourished.   HENT:   Head: Normocephalic and atraumatic.   Eyes: EOM are normal. Pupils are equal, round, and reactive to light.   Neck: Normal range of motion. Neck supple. No JVD present. No tracheal deviation present.   Cardiovascular: Normal rate, regular rhythm, normal heart sounds and intact distal pulses.  Exam reveals no friction rub.    No murmur heard.  Pulmonary/Chest: Effort normal and breath sounds normal. No respiratory distress. She has no wheezes. She has no rales.   Abdominal: Soft. Bowel sounds are normal. She exhibits no distension. There is tenderness.   + wound on abdomen midline with serosangious and yellow discharge 2x2 cm   Musculoskeletal: Normal range of motion. She exhibits no edema, tenderness or deformity.   Lymphadenopathy:     She has no cervical adenopathy.   Neurological: She is alert and oriented to person, place, and time. No cranial nerve deficit.   Skin: Skin is warm.   + wound please see abdominal exam   Psychiatric: She has a normal mood and affect. Her behavior is normal. Thought content normal.   Nursing note and vitals reviewed.      Significant Labs:   Blood Culture:   Recent Labs  Lab 08/02/18 1918 08/02/18 1948   LABBLOO No growth after 5 days. No growth after 5 days.     BMP: No results for input(s): GLU, NA, K, CL, CO2, BUN, CREATININE, CALCIUM, MG in the last 48 hours.  CBC: No results for input(s): WBC, HGB, HCT, PLT in the last 48 hours.  Wound Culture:   Recent Labs  Lab 02/28/18  0910 04/18/18  0920 07/11/18  0930 08/02/18 1918   LABAERO STAPHYLOCOCCUS AUREUSModerate  ESCHERICHIA COLIFew ESCHERICHIA COLIMany ESCHERICHIA COLIModerate  ESCHERICHIA COLIFew ESCHERICHIA COLIMany  KLEBSIELLA OXYTOCA ESBLModerate     All pertinent labs within the past 24 hours have been reviewed.    Significant Imaging: I have reviewed all pertinent imaging  results/findings within the past 24 hours.

## 2018-08-10 NOTE — ASSESSMENT & PLAN NOTE
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 But plan now will to operate on the wound right now only and drain possible abscess  ---- Would recommend to obtain intra-operative cultures if possible

## 2018-08-11 LAB
ALBUMIN SERPL BCP-MCNC: 2.4 G/DL
ALP SERPL-CCNC: 87 U/L
ALT SERPL W/O P-5'-P-CCNC: 16 U/L
ANION GAP SERPL CALC-SCNC: 11 MMOL/L
AST SERPL-CCNC: 16 U/L
BASOPHILS # BLD AUTO: 0.01 K/UL
BASOPHILS NFR BLD: 0.1 %
BILIRUB SERPL-MCNC: 0.2 MG/DL
BUN SERPL-MCNC: 15 MG/DL
CALCIUM SERPL-MCNC: 9.2 MG/DL
CHLORIDE SERPL-SCNC: 102 MMOL/L
CO2 SERPL-SCNC: 25 MMOL/L
CREAT SERPL-MCNC: 1.1 MG/DL
DIFFERENTIAL METHOD: ABNORMAL
EOSINOPHIL # BLD AUTO: 0 K/UL
EOSINOPHIL NFR BLD: 0 %
ERYTHROCYTE [DISTWIDTH] IN BLOOD BY AUTOMATED COUNT: 15.3 %
EST. GFR  (AFRICAN AMERICAN): >60 ML/MIN/1.73 M^2
EST. GFR  (NON AFRICAN AMERICAN): 52 ML/MIN/1.73 M^2
GLUCOSE SERPL-MCNC: 239 MG/DL
HCT VFR BLD AUTO: 26.7 %
HGB BLD-MCNC: 8.6 G/DL
LYMPHOCYTES # BLD AUTO: 0.9 K/UL
LYMPHOCYTES NFR BLD: 11.7 %
MCH RBC QN AUTO: 24.9 PG
MCHC RBC AUTO-ENTMCNC: 32.2 G/DL
MCV RBC AUTO: 77 FL
MONOCYTES # BLD AUTO: 0.4 K/UL
MONOCYTES NFR BLD: 6 %
NEUTROPHILS # BLD AUTO: 6 K/UL
NEUTROPHILS NFR BLD: 82.1 %
PLATELET # BLD AUTO: 412 K/UL
PMV BLD AUTO: 8.6 FL
POCT GLUCOSE: 153 MG/DL (ref 70–110)
POCT GLUCOSE: 157 MG/DL (ref 70–110)
POCT GLUCOSE: 223 MG/DL (ref 70–110)
POCT GLUCOSE: 309 MG/DL (ref 70–110)
POTASSIUM SERPL-SCNC: 4.7 MMOL/L
PROT SERPL-MCNC: 7.7 G/DL
RBC # BLD AUTO: 3.45 M/UL
SODIUM SERPL-SCNC: 138 MMOL/L
WBC # BLD AUTO: 7.32 K/UL

## 2018-08-11 PROCEDURE — 94761 N-INVAS EAR/PLS OXIMETRY MLT: CPT

## 2018-08-11 PROCEDURE — 63600175 PHARM REV CODE 636 W HCPCS: Performed by: INTERNAL MEDICINE

## 2018-08-11 PROCEDURE — 63600175 PHARM REV CODE 636 W HCPCS: Performed by: STUDENT IN AN ORGANIZED HEALTH CARE EDUCATION/TRAINING PROGRAM

## 2018-08-11 PROCEDURE — 25000003 PHARM REV CODE 250: Performed by: STUDENT IN AN ORGANIZED HEALTH CARE EDUCATION/TRAINING PROGRAM

## 2018-08-11 PROCEDURE — 63600175 PHARM REV CODE 636 W HCPCS: Performed by: ANESTHESIOLOGY

## 2018-08-11 PROCEDURE — 11000001 HC ACUTE MED/SURG PRIVATE ROOM

## 2018-08-11 PROCEDURE — 25000003 PHARM REV CODE 250: Performed by: SURGERY

## 2018-08-11 PROCEDURE — 36415 COLL VENOUS BLD VENIPUNCTURE: CPT

## 2018-08-11 PROCEDURE — 85025 COMPLETE CBC W/AUTO DIFF WBC: CPT

## 2018-08-11 PROCEDURE — 80053 COMPREHEN METABOLIC PANEL: CPT

## 2018-08-11 PROCEDURE — 63600175 PHARM REV CODE 636 W HCPCS: Performed by: SURGERY

## 2018-08-11 RX ADMIN — CYANOCOBALAMIN 100 MCG: 1000 INJECTION, SOLUTION INTRAMUSCULAR; SUBCUTANEOUS at 08:08

## 2018-08-11 RX ADMIN — ERTAPENEM SODIUM 1 G: 1 INJECTION, POWDER, LYOPHILIZED, FOR SOLUTION INTRAMUSCULAR; INTRAVENOUS at 02:08

## 2018-08-11 RX ADMIN — HYDROCODONE BITARTRATE AND ACETAMINOPHEN 1 TABLET: 5; 325 TABLET ORAL at 06:08

## 2018-08-11 RX ADMIN — HYDROCODONE BITARTRATE AND ACETAMINOPHEN 1 TABLET: 5; 325 TABLET ORAL at 05:08

## 2018-08-11 RX ADMIN — PIPERACILLIN AND TAZOBACTAM 4.5 G: 4; .5 INJECTION, POWDER, LYOPHILIZED, FOR SOLUTION INTRAVENOUS; PARENTERAL at 08:08

## 2018-08-11 RX ADMIN — INSULIN ASPART 4 UNITS: 100 INJECTION, SOLUTION INTRAVENOUS; SUBCUTANEOUS at 06:08

## 2018-08-11 RX ADMIN — INSULIN ASPART 2 UNITS: 100 INJECTION, SOLUTION INTRAVENOUS; SUBCUTANEOUS at 12:08

## 2018-08-11 RX ADMIN — HYDROCODONE BITARTRATE AND ACETAMINOPHEN 1 TABLET: 5; 325 TABLET ORAL at 12:08

## 2018-08-11 RX ADMIN — LOSARTAN POTASSIUM 100 MG: 50 TABLET, FILM COATED ORAL at 08:08

## 2018-08-11 RX ADMIN — HYDROCODONE BITARTRATE AND ACETAMINOPHEN 1 TABLET: 5; 325 TABLET ORAL at 11:08

## 2018-08-11 RX ADMIN — ONDANSETRON 4 MG: 2 INJECTION INTRAMUSCULAR; INTRAVENOUS at 03:08

## 2018-08-11 RX ADMIN — INSULIN DETEMIR 10 UNITS: 100 INJECTION, SOLUTION SUBCUTANEOUS at 08:08

## 2018-08-11 NOTE — PLAN OF CARE
Problem: Diabetes, Type 2 (Adult)  Goal: Signs and Symptoms of Listed Potential Problems Will be Absent, Minimized or Managed (Diabetes, Type 2)  Signs and symptoms of listed potential problems will be absent, minimized or managed by discharge/transition of care (reference Diabetes, Type 2 (Adult) CPG).   Outcome: Ongoing (interventions implemented as appropriate)       Problem: Patient Care Overview  Goal: Plan of Care Review  Outcome: Ongoing (interventions implemented as appropriate)  Patient AAOx3, NAD noted, VSS.  No complaints of pain at this time.  PRN Norco given earlier with relief.  Zofran given earlier for complaints of nausea.  Patient ambulating to bathroom with 1 person assist.  Free from falls.  Blood sugar being monitored with sliding scale coverage.  Contact isolation maintained, educated on PPE.  Safety maintained.  Will continue to monitor.    Problem: Infection, Risk/Actual (Adult)  Goal: Infection Prevention/Resolution  Patient will demonstrate the desired outcomes by discharge/transition of care.   Outcome: Ongoing (interventions implemented as appropriate)       Problem: Fall Risk (Adult)  Goal: Absence of Falls  Patient will demonstrate the desired outcomes by discharge/transition of care.   Outcome: Ongoing (interventions implemented as appropriate)       Problem: Pain, Acute (Adult)  Goal: Acceptable Pain Control/Comfort Level  Patient will demonstrate the desired outcomes by discharge/transition of care.   Outcome: Ongoing (interventions implemented as appropriate)       Problem: Nausea/Vomiting (Adult)  Goal: Symptom Relief  Patient will demonstrate the desired outcomes by discharge/transition of care.   Outcome: Ongoing (interventions implemented as appropriate)       Problem: Skin Integrity Impairment, Risk/Actual (Adult)  Goal: Skin Integrity/Wound Healing  Patient will demonstrate the desired outcomes by discharge/transition of care.   Outcome: Ongoing (interventions implemented as  appropriate)

## 2018-08-11 NOTE — PROGRESS NOTES
"[facility] Medicine Progress Note    Primary Team: [facility] Hospitalist Team B  Attending Physician: Dr. Khoury/Elizabet  Resident: Ranjit Braun  Intern: Naseem Martinez    Subjective:      Patient awake and alert this morning. No acute events overnight.     Exlap yesterday with exploration and intraop cultures taken. Patient tolerated procedure well, no complications.  Denies pain this morning. Dressing intact with serosang drainage, no surrounding erythema.     Denies CP, SOB. Stooling and voiding appropriately.Tolerating PO without N/D. Denies fever, chills.      Objective:     Last 24 Hour Vital Signs:  BP  Min: 117/69  Max: 147/67  Temp  Av.8 °F (36.6 °C)  Min: 96.6 °F (35.9 °C)  Max: 98.8 °F (37.1 °C)  Pulse  Av.9  Min: 70  Max: 88  Resp  Av.9  Min: 15  Max: 20  SpO2  Av.6 %  Min: 92 %  Max: 100 %  Height  Av' 11" (149.9 cm)  Min: 4' 11" (149.9 cm)  Max: 4' 11" (149.9 cm)  Weight  Av.8 kg (171 lb 8.3 oz)  Min: 77.8 kg (171 lb 8.3 oz)  Max: 77.8 kg (171 lb 8.3 oz)  I/O last 3 completed shifts:  In: 1128.7 [P.O.:828.7; IV Piggyback:300]  Out: 0     Physical Examination:  General: Patient sitting up in bed in NAD  CV: RRR with no rubs murmurs or s3 or s4  Resp: CTAB with no wheezes crackles or rhonchi  Abdomen: Soft, NT, ND. Dressing covering lower abdomen intact, serosang drainage, no sign of surrounding erythema  Extremities- SCDs and bobbi hose in place, distal pulses equal and intact  Neuro: aax3    Laboratory:  Laboratory Data Reviewed: yes  Pertinent Findings:  POCT 239  H/h 8.6/26.7    Microbiology Data Reviewed: yes  Pertinent Findings:  Culture from  grew EColi and Klebsiella   New Cultures show NG  Intraop cultures drawn and Fungal cx - pending      Radiology Data Reviewed: yes  Pertinent Findings:  No new Radiology studies.    Current Medications:     Infusions:       Scheduled:   cyanocobalamin  100 mcg Intramuscular Daily    losartan  100 mg Oral Daily    " piperacillin-tazobactam (ZOSYN) IVPB  4.5 g Intravenous Q8H    scopolamine  1 patch Transdermal Q3 Days        PRN:  ceFAZolin (ANCEF) IVPB, dextrose 50%, dextrose 50%, glucagon (human recombinant), glucose, glucose, HYDROcodone-acetaminophen, insulin aspart U-100, ondansetron, simethicone    Antibiotics and Day Number of Therapy:  Zosyn on 8/2    Lines and Day Number of Therapy:  Peripheral IV: L forearm 3d    Assessment:     Azucena Morrison is a 68 y.o.female with  Patient Active Problem List    Diagnosis Date Noted    Abdominal wall abscess 08/02/2018    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    Polyp of colon 06/04/2018    Generalized abdominal pain     LLQ pain     Diabetes with skin complication     Unspecified local infection of skin and subcutaneous tissue     Diabetes mellitus     Type 2 diabetes mellitus with skin complication     Abscess of abdominal wall 07/11/2017    Tachycardia 12/09/2016    S/P exploratory laparotomy     S/P colostomy 11/14/2016    Fistula of intestine, excluding rectum and anus 11/14/2016    Drainage from wound 08/03/2016    Essential hypertension 08/02/2016    History of hemicolectomy 05/17/2016    Status post Aiyana's procedure 05/17/2016    HTN (hypertension) 05/17/2016    Type 2 diabetes mellitus without complication 05/17/2016    Suture granuloma 05/17/2016        Plan:     1) Abdominal wounds; surgical wounds, abdominal pain  - continue wound care and surgical management per primary team  - agree with IV zosyn for abx coverage; abdominal wounds with ESBL E coli, Klebsiella  - pain and anti-emetic management per primary team  - fistulagram showed colonocutaneous fistula   - ID following- recommend continue zosyn and obtain intraoperative cultures  - abd wall abscess drained, exlap by surgery completed, intraop cultures drawn        2) Hypertension  - blood pressure currently at 135/68; has been mainly normotensive throughout stay  - cont losartan 100  - adding BP  home meds in stepwise fashion as needed     3) diabetes mellitus type II  - hold metformin while inpatient  - initiated sliding scale in insulin with q 4 accuchecks  -Glucose currently 239  - cont levemir 10 u qam     4) microcytic anemia  - H/H 9.4/30.5 on admission, with MCV 77 -> H/h 8.6/26.7, likely 2/2 blood loss during sx  - anemia panel: Fe: 87, TIBC: 332, Sat Fe: 26; Transferrin: 224, Ferritin: 97, Folate: 10, B12: 163.  - Started on B12 1000mcg daily on 8/4     5) thrombocytosis  - likely reactive due to concurrent wound/infection burden  - continue to monitor  - downtrending 570 -> 366 -> 412    6) hyponatremia, hypotonic, resolved  - serum osmole, urine osmole, urine Na: 291, 618 and 138 respectively.  - recommend primary team reassess fluid status; patient likely does not need IVF at this time -> Na was downtrending -> now resolved     7) globulin gap  - consider HIV  - Hep A IgM, Hep B Igm, and surface antigen, HEP C Anitgen: all negative.       Code: Full  Diet: diabetic  DVT: SCD  Dispo: blood pressure and glucose control, will follow from distance      Nolvia Gusman MD  [facility]    [facility] Medicine Hospitalist Pager numbers:   [facility] Hospitalist Medicine Team A (Omero/Cuauhtemoc): [phone]  [facility] Hospitalist Medicine Team B (Elizabet/Peng):  [phone]

## 2018-08-11 NOTE — ASSESSMENT & PLAN NOTE
----Wound culture from 8/2/2018 revealing Ecoli and Klebs oxycota-ESBL  ---- On Pip/tazo since 8/2    Recommendations:  ---- Continue contact precautions  --- Stop Pip/tazo. 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  ---- S/P Wound exploration ,excision and debridement of abdominal wound 8/10/2018  ----  Follow results intra-operative cultures that were obtained

## 2018-08-11 NOTE — PROGRESS NOTES
[facility]  Infectious Disease  Progress Note    Patient Name: Azucena Morrison  MRN: [id number]  Admission Date: 8/2/2018  Length of Stay: 9 days  Attending Physician: Ulisses Horton MD  Primary Care Provider: Pj Sanches MD    Isolation Status: Contact  Assessment/Plan:      Drainage from wound    ----Wound culture from 8/2/2018 revealing Ecoli and Klebs oxycota-ESBL  ---- On Pip/tazo since 8/2    Recommendations:  ---- Continue contact precautions  --- Stop Pip/tazo. 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  ---- S/P Wound exploration ,excision and debridement of abdominal wound 8/10/2018  ----  Follow results intra-operative cultures that were obtained            Anticipated Disposition:     Thank you for your consult. I will follow-up with patient. Please contact us if you have any additional questions.    Bob Zarate MD  [facility] Infectious Disease Fellow, PGY4  ID pager [phone]  Infectious Disease  [facility]    Subjective:     Principal Problem:Abdominal wall abscess    HPI: This is a very pleasant 67 YO F from [place] who has a PMH of HTN, DM, kidney stones, recurrent diverticulitis that led to a 80% hemicolectomy in 2015. According to the patient she tells me [facility]. Later was seen bu Dr Alfaro and had a reversal of colostomy and closure in 11/2016 and other compliacted surgerires for fistula and incarcerated ventral hernia. According to the patient she had total 5 surgeries after the first surgery in [place].  According to the patient she had no abdominal wound infection till now but she did mention that she took possibly augmentin and bactrim for one year before this.  As note after reviewing records the wound culture from 7/11/2018 was growing Ecoli and according to her  home meds she was prescribed bactrim on 7/18/2018. Not sure if she took that or not.  This last Friday she started having 2-3 intensity dull abdominal discomfort with severe N/V and abdominal wound serosangious discharge. Denied any chills, no fevers and she was admitted to [facility] on 8/2/2018.  She has been on Pip/Tazo since 8/2/2018 and wound cx collected on 8/2/2018 is revealing Ecoli and ESBL Klebsiella now. ID consult called 8/6/2018 for choice of antibiotics.  Interval History: pt seen this afternoon c/o mild headache and abdominal discomfort, no fevers    Review of Systems   Constitutional: Negative.    HENT: Negative for congestion, dental problem, ear discharge and facial swelling.    Eyes: Negative for pain, discharge and itching.   Respiratory: Negative for apnea, choking and chest tightness.    Cardiovascular: Negative for chest pain and palpitations.   Gastrointestinal: Positive for abdominal pain. Negative for constipation, diarrhea, nausea and vomiting.   Genitourinary: Negative for difficulty urinating and dyspareunia.   Musculoskeletal: Negative for arthralgias, gait problem and myalgias.   Skin: Positive for wound. Negative for color change and pallor.        Skin with dressing and wound open s/p surgery   Neurological: Negative for dizziness, facial asymmetry and headaches.   Psychiatric/Behavioral: Negative for agitation, behavioral problems and confusion.   All other systems reviewed and are negative.    Objective:     Vital Signs (Most Recent):  Temp: 97 °F (36.1 °C) (08/11/18 1215)  Pulse: 63 (08/11/18 1215)  Resp: 18 (08/11/18 1215)  BP: (!) 150/84 (08/11/18 1215)  SpO2: 97 % (08/11/18 1148) Vital Signs (24h Range):  Temp:  [96.7 °F (35.9 °C)-98.8 °F (37.1 °C)] 97 °F (36.1 °C)  Pulse:  [63-88] 63  Resp:  [16-19] 18  SpO2:  [94 %-99 %] 97 %  BP: (130-150)/(67-84) 150/84     Weight: 77.8 kg (171 lb 8.3 oz)  Body mass index is 34.64 kg/m².    Estimated Creatinine Clearance: 61 mL/min (based on SCr  of 1.1 mg/dL).    Physical Exam   Constitutional: She is oriented to person, place, and time. She appears well-developed and well-nourished.   HENT:   Head: Normocephalic and atraumatic.   Eyes: EOM are normal. Pupils are equal, round, and reactive to light.   Neck: Normal range of motion. Neck supple. No JVD present. No tracheal deviation present.   Cardiovascular: Normal rate, regular rhythm, normal heart sounds and intact distal pulses.  Exam reveals no friction rub.    No murmur heard.  Pulmonary/Chest: Effort normal and breath sounds normal. No respiratory distress. She has no wheezes. She has no rales.   Abdominal: Soft. Bowel sounds are normal. She exhibits no distension. There is tenderness.   + wound s/p wound debridement with dressing   Musculoskeletal: Normal range of motion. She exhibits no edema, tenderness or deformity.   Lymphadenopathy:     She has no cervical adenopathy.   Neurological: She is alert and oriented to person, place, and time. No cranial nerve deficit.   Skin: Skin is warm.   + wound please see abdominal exam   Psychiatric: She has a normal mood and affect. Her behavior is normal. Thought content normal.   Nursing note and vitals reviewed.      Significant Labs:   Blood Culture:   Recent Labs  Lab 08/02/18  1918 08/02/18 1948   LABBLOO No growth after 5 days. No growth after 5 days.     BMP:   Recent Labs  Lab 08/11/18  0513   *      K 4.7      CO2 25   BUN 15   CREATININE 1.1   CALCIUM 9.2     CBC:   Recent Labs  Lab 08/11/18  0514   WBC 7.32   HGB 8.6*   HCT 26.7*   *     Microbiology Results (last 7 days)     Procedure Component Value Units Date/Time    Aerobic culture [[id number]] Collected:  08/10/18 1400    Order Status:  Completed Specimen:  Tissue from Abdomen Updated:  08/11/18 0730     Aerobic Bacterial Culture No growth    Narrative:       #1 Infected Abdominal Tissue    Culture, Anaerobe [[id number]] Collected:  08/10/18 1400    Order Status:  Sent  Specimen:  Tissue from Abdomen Updated:  08/10/18 1923    Fungus culture [[id number]] Collected:  08/10/18 1400    Order Status:  Sent Specimen:  Tissue from Abdomen Updated:  08/10/18 1923    Blood culture [[id number]] Collected:  08/02/18 1918    Order Status:  Completed Specimen:  Blood from Peripheral, Antecubital, Right Updated:  08/08/18 0612     Blood Culture, Routine No growth after 5 days.    Blood culture [[id number]] Collected:  08/02/18 1948    Order Status:  Completed Specimen:  Blood from Peripheral, Hand, Left Updated:  08/08/18 0612     Blood Culture, Routine No growth after 5 days.    Aerobic culture (Specify Source) **CANNOT BE ORDERED AS STAT** [[id number]]  (Susceptibility) Collected:  08/02/18 1918    Order Status:  Completed Specimen:  Incision site from Abdomen Updated:  08/06/18 0809     Aerobic Bacterial Culture --     ESCHERICHIA COLI  Many       Aerobic Bacterial Culture --     KLEBSIELLA OXYTOCA ESBL  Moderate          Wound Culture:   Recent Labs  Lab 02/28/18  0910 04/18/18  0920 07/11/18  0930 08/02/18  1918 08/10/18  1400   LABAERO STAPHYLOCOCCUS AUREUSModerate  ESCHERICHIA COLIFew ESCHERICHIA COLIMany ESCHERICHIA COLIModerate  ESCHERICHIA COLIFew ESCHERICHIA COLIMany  KLEBSIELLA OXYTOCA ESBLModerate No growth     All pertinent labs within the past 24 hours have been reviewed.    Significant Imaging: I have reviewed all pertinent imaging results/findings within the past 24 hours.

## 2018-08-11 NOTE — PLAN OF CARE
Problem: Skin Integrity Impairment, Risk/Actual (Adult)  Goal: Identify Related Risk Factors and Signs and Symptoms  Related risk factors and signs and symptoms are identified upon initiation of Human Response Clinical Practice Guideline (CPG)  Outcome: Ongoing (interventions implemented as appropriate)  Recommendation/Intervention:   1. Encourage good iintake at meals.    Goals:   Pt will consume at least 75% intake at meals  Nutrition Goal Status: new

## 2018-08-11 NOTE — PROGRESS NOTES
" [facility]  Adult Nutrition  Progress Note    SUMMARY       Recommendations    Recommendation/Intervention:   1. Encourage good iintake at meals.    Goals:   Pt will consume at least 75% intake at meals  Nutrition Goal Status: new    Reason for Assessment  Reason for Assessment: length of stay  Diagnosis:  (abd wall abscess)  Relevant Medical History: DM, HTN, kidney stone, cholecystectomy, hernia repair  General Information Comments: Pt on 2000 ADA diet. Was NPO today for abdominal surgery. Pt with % intake at recent meals.  Nutrition Discharge Planning: pt to d/c on ADA diet    Nutrition Risk Screen  Nutrition Risk Screen: no indicators present    Nutrition/Diet History  Food Preferences: no Voodoo or cultural food prefs identified  Do you have any cultural, spiritual, Voodoo conflicts, given your current situation?: NONE    Anthropometrics  Temp: 98.8 °F (37.1 °C)  Height Method: Stated  Height: 4' 11" (149.9 cm)  Height (inches): 59 in  Weight Method: Bed Scale  Weight: 77.8 kg (171 lb 8.3 oz)  Weight (lb): 171.52 lb  Ideal Body Weight (IBW), Female: 95 lb  % Ideal Body Weight, Female (lb): 180.55 lb  BMI (Calculated): 34.7  BMI Grade: 30 - 34.9- obesity - grade I     Lab/Procedures/Meds  Pertinent Labs Reviewed: reviewed  Pertinent Labs Comments: Glu 224H, Alb 2.8:  Pertinent Medications Reviewed: reviewed    Physical Findings/Assessment  Overall Physical Appearance: overweight  Tubes:  (none)  Oral/Mouth Cavity: WDL  Skin:  (Barrett 21-abd wound)    Estimated/Assessed Needs  Weight Used For Calorie Calculations: 77.8 kg (171 lb 8.3 oz)  Energy Calorie Requirements (kcal): 1945  Energy Need Method: Kcal/kg (25 kcal/kg)  Protein Requirements: 78g (1.0g/kg)  Weight Used For Protein Calculations: 77.8 kg (171 lb 8.3 oz)  Fluid Need Method: RDA Method  RDA Method (mL): 1945  CHO Requirement: 175g    Nutrition Prescription Ordered  Current Diet Order: 2000 ADA    Evaluation of Received " Nutrient/Fluid Intake  Energy Calories Required: meeting needs  Protein Required: meeting needs  Fluid Required: meeting needs  Comments: LBM 8/8  % Intake of Estimated Energy Needs: 75 - 100 %  % Meal Intake: 75 - 100 %    Nutrition Risk  Level of Risk/Frequency of Follow-up:  (1xweekly)     Assessment and Plan    * Abdominal wall abscess    Contributing Nutrition Diagnosis  Increased energy needs    Related to (etiology):   Wound healing    Signs and Symptoms (as evidenced by):   Abdominal wound    Interventions/Recommendations (treatment strategy):  See recs    Nutrition Diagnosis Status:   New             Monitor and Evaluation  Food and Nutrient Intake: food and beverage intake  Food and Nutrient Adminstration: diet order  Physical Activity and Function: nutrition-related ADLs and IADLs  Anthropometric Measurements: weight  Biochemical Data, Medical Tests and Procedures: electrolyte and renal panel  Nutrition-Focused Physical Findings: overall appearance     Nutrition Follow-Up  RD Follow-up?: Yes

## 2018-08-11 NOTE — SUBJECTIVE & OBJECTIVE
Interval History: pt seen this afternoon c/o mild headache and abdominal discomfort, no fevers    Review of Systems   Constitutional: Negative.    HENT: Negative for congestion, dental problem, ear discharge and facial swelling.    Eyes: Negative for pain, discharge and itching.   Respiratory: Negative for apnea, choking and chest tightness.    Cardiovascular: Negative for chest pain and palpitations.   Gastrointestinal: Positive for abdominal pain. Negative for constipation, diarrhea, nausea and vomiting.   Genitourinary: Negative for difficulty urinating and dyspareunia.   Musculoskeletal: Negative for arthralgias, gait problem and myalgias.   Skin: Positive for wound. Negative for color change and pallor.        Skin with dressing and wound open s/p surgery   Neurological: Negative for dizziness, facial asymmetry and headaches.   Psychiatric/Behavioral: Negative for agitation, behavioral problems and confusion.   All other systems reviewed and are negative.    Objective:     Vital Signs (Most Recent):  Temp: 97 °F (36.1 °C) (08/11/18 1215)  Pulse: 63 (08/11/18 1215)  Resp: 18 (08/11/18 1215)  BP: (!) 150/84 (08/11/18 1215)  SpO2: 97 % (08/11/18 1148) Vital Signs (24h Range):  Temp:  [96.7 °F (35.9 °C)-98.8 °F (37.1 °C)] 97 °F (36.1 °C)  Pulse:  [63-88] 63  Resp:  [16-19] 18  SpO2:  [94 %-99 %] 97 %  BP: (130-150)/(67-84) 150/84     Weight: 77.8 kg (171 lb 8.3 oz)  Body mass index is 34.64 kg/m².    Estimated Creatinine Clearance: 61 mL/min (based on SCr of 1.1 mg/dL).    Physical Exam   Constitutional: She is oriented to person, place, and time. She appears well-developed and well-nourished.   HENT:   Head: Normocephalic and atraumatic.   Eyes: EOM are normal. Pupils are equal, round, and reactive to light.   Neck: Normal range of motion. Neck supple. No JVD present. No tracheal deviation present.   Cardiovascular: Normal rate, regular rhythm, normal heart sounds and intact distal pulses.  Exam reveals no  friction rub.    No murmur heard.  Pulmonary/Chest: Effort normal and breath sounds normal. No respiratory distress. She has no wheezes. She has no rales.   Abdominal: Soft. Bowel sounds are normal. She exhibits no distension. There is tenderness.   + wound s/p wound debridement with dressing   Musculoskeletal: Normal range of motion. She exhibits no edema, tenderness or deformity.   Lymphadenopathy:     She has no cervical adenopathy.   Neurological: She is alert and oriented to person, place, and time. No cranial nerve deficit.   Skin: Skin is warm.   + wound please see abdominal exam   Psychiatric: She has a normal mood and affect. Her behavior is normal. Thought content normal.   Nursing note and vitals reviewed.      Significant Labs:   Blood Culture:   Recent Labs  Lab 08/02/18 1918 08/02/18 1948   LABBLOO No growth after 5 days. No growth after 5 days.     BMP:   Recent Labs  Lab 08/11/18  0513   *      K 4.7      CO2 25   BUN 15   CREATININE 1.1   CALCIUM 9.2     CBC:   Recent Labs  Lab 08/11/18  0514   WBC 7.32   HGB 8.6*   HCT 26.7*   *     Microbiology Results (last 7 days)     Procedure Component Value Units Date/Time    Aerobic culture [[id number]] Collected:  08/10/18 1400    Order Status:  Completed Specimen:  Tissue from Abdomen Updated:  08/11/18 0730     Aerobic Bacterial Culture No growth    Narrative:       #1 Infected Abdominal Tissue    Culture, Anaerobe [[id number]] Collected:  08/10/18 1400    Order Status:  Sent Specimen:  Tissue from Abdomen Updated:  08/10/18 1923    Fungus culture [[id number]] Collected:  08/10/18 1400    Order Status:  Sent Specimen:  Tissue from Abdomen Updated:  08/10/18 1923    Blood culture [[id number]] Collected:  08/02/18 1918    Order Status:  Completed Specimen:  Blood from Peripheral, Antecubital, Right Updated:  08/08/18 0612     Blood Culture, Routine No growth after 5 days.    Blood culture [[id number]] Collected:  08/02/18 1948     Order Status:  Completed Specimen:  Blood from Peripheral, Hand, Left Updated:  08/08/18 0612     Blood Culture, Routine No growth after 5 days.    Aerobic culture (Specify Source) **CANNOT BE ORDERED AS STAT** [[id number]]  (Susceptibility) Collected:  08/02/18 1918    Order Status:  Completed Specimen:  Incision site from Abdomen Updated:  08/06/18 0809     Aerobic Bacterial Culture --     ESCHERICHIA COLI  Many       Aerobic Bacterial Culture --     KLEBSIELLA OXYTOCA ESBL  Moderate          Wound Culture:   Recent Labs  Lab 02/28/18  0910 04/18/18  0920 07/11/18  0930 08/02/18  1918 08/10/18  1400   LABAERO STAPHYLOCOCCUS AUREUSModerate  ESCHERICHIA COLIFew ESCHERICHIA COLIMany ESCHERICHIA COLIModerate  ESCHERICHIA COLIFew ESCHERICHIA COLIMany  KLEBSIELLA OXYTOCA ESBLModerate No growth     All pertinent labs within the past 24 hours have been reviewed.    Significant Imaging: I have reviewed all pertinent imaging results/findings within the past 24 hours.

## 2018-08-11 NOTE — ASSESSMENT & PLAN NOTE
Contributing Nutrition Diagnosis  Increased energy needs    Related to (etiology):   Wound healing    Signs and Symptoms (as evidenced by):   Abdominal wound    Interventions/Recommendations (treatment strategy):  See recs    Nutrition Diagnosis Status:   Improving

## 2018-08-12 LAB
POCT GLUCOSE: 159 MG/DL (ref 70–110)
POCT GLUCOSE: 167 MG/DL (ref 70–110)
POCT GLUCOSE: 194 MG/DL (ref 70–110)
POCT GLUCOSE: 209 MG/DL (ref 70–110)

## 2018-08-12 PROCEDURE — 63600175 PHARM REV CODE 636 W HCPCS: Performed by: INTERNAL MEDICINE

## 2018-08-12 PROCEDURE — 25000003 PHARM REV CODE 250: Performed by: STUDENT IN AN ORGANIZED HEALTH CARE EDUCATION/TRAINING PROGRAM

## 2018-08-12 PROCEDURE — 94761 N-INVAS EAR/PLS OXIMETRY MLT: CPT

## 2018-08-12 PROCEDURE — 11000001 HC ACUTE MED/SURG PRIVATE ROOM

## 2018-08-12 PROCEDURE — 63600175 PHARM REV CODE 636 W HCPCS: Performed by: SURGERY

## 2018-08-12 PROCEDURE — 25000003 PHARM REV CODE 250: Performed by: SURGERY

## 2018-08-12 RX ORDER — ONDANSETRON 2 MG/ML
8 INJECTION INTRAMUSCULAR; INTRAVENOUS EVERY 6 HOURS PRN
Status: DISCONTINUED | OUTPATIENT
Start: 2018-08-12 | End: 2018-08-18 | Stop reason: HOSPADM

## 2018-08-12 RX ADMIN — LOSARTAN POTASSIUM 100 MG: 50 TABLET, FILM COATED ORAL at 09:08

## 2018-08-12 RX ADMIN — SCOPOLAMINE 1 PATCH: 1 PATCH, EXTENDED RELEASE TRANSDERMAL at 09:08

## 2018-08-12 RX ADMIN — HYDROCODONE BITARTRATE AND ACETAMINOPHEN 1 TABLET: 5; 325 TABLET ORAL at 07:08

## 2018-08-12 RX ADMIN — INSULIN ASPART 2 UNITS: 100 INJECTION, SOLUTION INTRAVENOUS; SUBCUTANEOUS at 11:08

## 2018-08-12 RX ADMIN — HYDROCODONE BITARTRATE AND ACETAMINOPHEN 1 TABLET: 5; 325 TABLET ORAL at 01:08

## 2018-08-12 RX ADMIN — HYDROCODONE BITARTRATE AND ACETAMINOPHEN 1 TABLET: 5; 325 TABLET ORAL at 04:08

## 2018-08-12 RX ADMIN — INSULIN DETEMIR 10 UNITS: 100 INJECTION, SOLUTION SUBCUTANEOUS at 09:08

## 2018-08-12 RX ADMIN — ONDANSETRON 8 MG: 2 INJECTION INTRAMUSCULAR; INTRAVENOUS at 10:08

## 2018-08-12 RX ADMIN — ERTAPENEM SODIUM 1 G: 1 INJECTION, POWDER, LYOPHILIZED, FOR SOLUTION INTRAMUSCULAR; INTRAVENOUS at 12:08

## 2018-08-12 RX ADMIN — HYDROCODONE BITARTRATE AND ACETAMINOPHEN 1 TABLET: 5; 325 TABLET ORAL at 09:08

## 2018-08-12 RX ADMIN — HYDROCODONE BITARTRATE AND ACETAMINOPHEN 1 TABLET: 5; 325 TABLET ORAL at 11:08

## 2018-08-12 NOTE — PLAN OF CARE
Problem: Patient Care Overview  Goal: Plan of Care Review  Plan of care reviewed w/ pt. AAOx3 pain controlled w/ prn meds . dsg to lower abd incision site remains cdi . Contact isolation maintained . Ambulates to BR well independently . ruthy scds while in bed . IV abx continued . VSS . Safety precautions maintained . Call light in reach.

## 2018-08-12 NOTE — PROGRESS NOTES
[facility]  Infectious Disease  Progress Note    Patient Name: Azucena Morrison  MRN: [id number]  Admission Date: 8/2/2018  Length of Stay: 10 days  Attending Physician: Ulisses Horton MD  Primary Care Provider: Pj Sanches MD    Isolation Status: Contact  Assessment/Plan:      * Abdominal wall abscess    69 y/o female with recurrent diverticulitis with multiple abdominal surgeries in past now with abdominal wall abscess. Superficial wound culture from 8/2/2018 revealing Ecoli and Klebs oxycota-ESBL. Started On Pip/tazo 8/2. Had I + D of abdominal wall abscess on 8/10 - cultures pending. Piptazo changed to ertapenem on 8/11 pending surgical cultures. 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      Recommendations:  ---- Continue contact precautions  --- continue Ertapenem  ---- S/P Wound exploration ,excision and debridement of abdominal wound 8/10/2018  ----  Follow results intra-operative cultures that were obtained                     Anticipated Disposition: unclear at this point    Thank you for your consult. I will follow-up with patient. Please contact us if you have any additional questions.    Lisseth Frazier MD  Infectious Disease  [facility]    Subjective:     Principal Problem:Abdominal wall abscess    HPI: This is a very pleasant 67 YO F from [place] who has a PMH of HTN, DM, kidney stones, recurrent diverticulitis that led to a 80% hemicolectomy in 2015. According to the patient she tells me that she was operated in later in [facility] and had a colostomy then. Later was seen bu Dr Alfaro and had a reversal of colostomy and closure in 11/2016 and other compliacted surgerires for fistula and incarcerated ventral hernia. According to the patient she had total 5 surgeries after the first surgery in [place].  According to the  patient she had no abdominal wound infection till now but she did mention that she took possibly augmentin and bactrim for one year before this.  As note after reviewing records the wound culture from 7/11/2018 was growing Ecoli and according to her home meds she was prescribed bactrim on 7/18/2018. Not sure if she took that or not.  This last Friday she started having 2-3 intensity dull abdominal discomfort with severe N/V and abdominal wound serosangious discharge. Denied any chills, no fevers and she was admitted to [facility] on 8/2/2018.  She has been on Pip/Tazo since 8/2/2018 and wound cx collected on 8/2/2018 is revealing Ecoli and ESBL Klebsiella now. ID consult called 8/6/2018 for choice of antibiotics.    Patient taken to OR on 8/10 for I + D of abdominal wound - cultures pending  Interval History: Patient with no complaints. Updated her on culture information - no growth so far. Tolerating ertapenem    Review of Systems   Respiratory: Negative for shortness of breath.    Gastrointestinal: Negative for diarrhea, nausea and vomiting.     Antibiotics (From admission, onward)    Start     Stop Route Frequency Ordered    08/11/18 1300  ertapenem (INVANZ) 1 g in sodium chloride 0.9 % 100 mL IVPB (ready to mix system)      -- IV Every 24 hours (non-standard times) 08/11/18 1146        Objective:     Vital Signs (Most Recent):  Temp: 98.4 °F (36.9 °C) (08/12/18 1239)  Pulse: 80 (08/12/18 1239)  Resp: 16 (08/12/18 1239)  BP: 133/72 (08/12/18 1239)  SpO2: 98 % (08/12/18 1239) Vital Signs (24h Range):  Temp:  [96.5 °F (35.8 °C)-98.7 °F (37.1 °C)] 98.4 °F (36.9 °C)  Pulse:  [75-84] 80  Resp:  [16-18] 16  SpO2:  [96 %-99 %] 98 %  BP: (129-171)/(70-83) 133/72     Weight: 77.8 kg (171 lb 8.3 oz)  Body mass index is 34.64 kg/m².    Estimated Creatinine Clearance: 61 mL/min (based on SCr of 1.1 mg/dL).    Physical Exam   Cardiovascular: Normal heart sounds.   No murmur heard.  Pulmonary/Chest: Breath sounds normal. No  respiratory distress. She has no wheezes.   Abdominal: Bowel sounds are normal. She exhibits no distension. There is no tenderness.   Musculoskeletal: She exhibits no edema.       Significant Labs:   Blood Culture:   Recent Labs   Lab  18   194   LABBLOO  No growth after 5 days.  No growth after 5 days.     CBC:   Recent Labs   Lab  18   0514   WBC  7.32   HGB  8.6*   HCT  26.7*   PLT  412*     CMP:   Recent Labs   Lab  18   0513   NA  138   K  4.7   CL  102   CO2  25   GLU  239*   BUN  15   CREATININE  1.1   CALCIUM  9.2   PROT  7.7   ALBUMIN  2.4*   BILITOT  0.2   ALKPHOS  87   AST  16   ALT  16   ANIONGAP  11   EGFRNONAA  52*     Urine Studies:   Recent Labs   Lab  18   1840   COLORU  Yellow   APPEARANCEUA  Hazy*   PHUR  6.0   SPECGRAV  >=1.030*   PROTEINUA  Trace*   GLUCUA  Negative   KETONESU  Negative   BILIRUBINUA  Negative   OCCULTUA  1+*   NITRITE  Negative   UROBILINOGEN  Negative   LEUKOCYTESUR  Negative   RBCUA  0     Wound Culture:   Recent Labs   Lab  18   0910  18   0920  18   0930  18   1918  08/10/18   1400   LABAERO  STAPHYLOCOCCUS AUREUS  Moderate    ESCHERICHIA COLI  Few    ESCHERICHIA COLI  Many    ESCHERICHIA COLI  Moderate    ESCHERICHIA COLI  Few    ESCHERICHIA COLI  Many    KLEBSIELLA OXYTOCA ESBL  Moderate    No growth       Significant Imagin/8 US soft tissue -Impression      Complex appearing periumbilical area of abnormal echogenicity, difficult to adequately assess with ultrasound, consider CT with IV and oral/rectal contrast if clinically warranted.

## 2018-08-12 NOTE — ASSESSMENT & PLAN NOTE
69 y/o female with recurrent diverticulitis with multiple abdominal surgeries in past now with abdominal wall abscess. Superficial wound culture from 8/2/2018 revealing Ecoli and Klebs oxycota-ESBL. Started On Pip/tazo 8/2. Had I + D of abdominal wall abscess on 8/10 - cultures pending. Piptazo changed to ertapenem on 8/11 pending surgical cultures. 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      Recommendations:  ---- Continue contact precautions  --- continue Ertapenem  ---- S/P Wound exploration ,excision and debridement of abdominal wound 8/10/2018  ----  Follow results intra-operative cultures that were obtained

## 2018-08-12 NOTE — SUBJECTIVE & OBJECTIVE
Interval History: Patient with no complaints. Updated her on culture information - no growth so far. Tolerating ertapenem    Review of Systems   Respiratory: Negative for shortness of breath.    Gastrointestinal: Negative for diarrhea, nausea and vomiting.     Antibiotics (From admission, onward)    Start     Stop Route Frequency Ordered    08/11/18 1300  ertapenem (INVANZ) 1 g in sodium chloride 0.9 % 100 mL IVPB (ready to mix system)      -- IV Every 24 hours (non-standard times) 08/11/18 1146        Objective:     Vital Signs (Most Recent):  Temp: 98.4 °F (36.9 °C) (08/12/18 1239)  Pulse: 80 (08/12/18 1239)  Resp: 16 (08/12/18 1239)  BP: 133/72 (08/12/18 1239)  SpO2: 98 % (08/12/18 1239) Vital Signs (24h Range):  Temp:  [96.5 °F (35.8 °C)-98.7 °F (37.1 °C)] 98.4 °F (36.9 °C)  Pulse:  [75-84] 80  Resp:  [16-18] 16  SpO2:  [96 %-99 %] 98 %  BP: (129-171)/(70-83) 133/72     Weight: 77.8 kg (171 lb 8.3 oz)  Body mass index is 34.64 kg/m².    Estimated Creatinine Clearance: 61 mL/min (based on SCr of 1.1 mg/dL).    Physical Exam   Cardiovascular: Normal heart sounds.   No murmur heard.  Pulmonary/Chest: Breath sounds normal. No respiratory distress. She has no wheezes.   Abdominal: Bowel sounds are normal. She exhibits no distension. There is no tenderness.   Musculoskeletal: She exhibits no edema.       Significant Labs:   Blood Culture:   Recent Labs   Lab  08/02/18 1918 08/02/18 1948   LABBLOO  No growth after 5 days.  No growth after 5 days.     CBC:   Recent Labs   Lab  08/11/18   0514   WBC  7.32   HGB  8.6*   HCT  26.7*   PLT  412*     CMP:   Recent Labs   Lab  08/11/18   0513   NA  138   K  4.7   CL  102   CO2  25   GLU  239*   BUN  15   CREATININE  1.1   CALCIUM  9.2   PROT  7.7   ALBUMIN  2.4*   BILITOT  0.2   ALKPHOS  87   AST  16   ALT  16   ANIONGAP  11   EGFRNONAA  52*     Urine Studies:   Recent Labs   Lab  08/02/18   1840   COLORU  Yellow   APPEARANCEUA  Hazy*   PHUR  6.0   SPECGRAV  >=1.030*    PROTEINUA  Trace*   GLUCUA  Negative   KETONESU  Negative   BILIRUBINUA  Negative   OCCULTUA  1+*   NITRITE  Negative   UROBILINOGEN  Negative   LEUKOCYTESUR  Negative   RBCUA  0     Wound Culture:   Recent Labs   Lab  18   0910  18   0920  18   0930  18   1918  08/10/18   1400   LABAERO  STAPHYLOCOCCUS AUREUS  Moderate    ESCHERICHIA COLI  Few    ESCHERICHIA COLI  Many    ESCHERICHIA COLI  Moderate    ESCHERICHIA COLI  Few    ESCHERICHIA COLI  Many    KLEBSIELLA OXYTOCA ESBL  Moderate    No growth       Significant Imagin/8 US soft tissue -Impression      Complex appearing periumbilical area of abnormal echogenicity, difficult to adequately assess with ultrasound, consider CT with IV and oral/rectal contrast if clinically warranted.

## 2018-08-12 NOTE — PROGRESS NOTES
"Surgery follow up  /83   Pulse 75   Temp 96.5 °F (35.8 °C)   Resp 18   Ht 4' 11" (1.499 m)   Wt 77.8 kg (171 lb 8.3 oz)   SpO2 98%   Breastfeeding? No   BMI 34.64 kg/m²   I/O last 3 completed shifts:  In: 540 [P.O.:240; IV Piggyback:300]  Out: 200 [Urine:200]  No intake/output data recorded.  Recent Results (from the past 336 hour(s))   CBC auto differential    Collection Time: 08/11/18  5:14 AM   Result Value Ref Range    WBC 7.32 3.90 - 12.70 K/uL    Hemoglobin 8.6 (L) 12.0 - 16.0 g/dL    Hematocrit 26.7 (L) 37.0 - 48.5 %    Platelets 412 (H) 150 - 350 K/uL   CBC auto differential    Collection Time: 08/08/18 10:24 AM   Result Value Ref Range    WBC 13.05 (H) 3.90 - 12.70 K/uL    Hemoglobin 9.6 (L) 12.0 - 16.0 g/dL    Hematocrit 30.3 (L) 37.0 - 48.5 %    Platelets 366 (H) 150 - 350 K/uL   CBC auto differential    Collection Time: 08/02/18  7:17 PM   Result Value Ref Range    WBC 8.68 3.90 - 12.70 K/uL    Hemoglobin 10.1 (L) 12.0 - 16.0 g/dL    Hematocrit 30.6 (L) 37.0 - 48.5 %    Platelets 570 (H) 150 - 350 K/uL       Abdomen soft non tender , mild drainage wound , continue present treatment.waiting culture report.  "

## 2018-08-13 LAB
POCT GLUCOSE: 149 MG/DL (ref 70–110)
POCT GLUCOSE: 157 MG/DL (ref 70–110)
POCT GLUCOSE: 166 MG/DL (ref 70–110)
POCT GLUCOSE: 228 MG/DL (ref 70–110)

## 2018-08-13 PROCEDURE — 63600175 PHARM REV CODE 636 W HCPCS: Performed by: SURGERY

## 2018-08-13 PROCEDURE — 63600175 PHARM REV CODE 636 W HCPCS: Performed by: INTERNAL MEDICINE

## 2018-08-13 PROCEDURE — 25000003 PHARM REV CODE 250: Performed by: SURGERY

## 2018-08-13 PROCEDURE — 11000001 HC ACUTE MED/SURG PRIVATE ROOM

## 2018-08-13 PROCEDURE — 25000003 PHARM REV CODE 250: Performed by: STUDENT IN AN ORGANIZED HEALTH CARE EDUCATION/TRAINING PROGRAM

## 2018-08-13 PROCEDURE — 94761 N-INVAS EAR/PLS OXIMETRY MLT: CPT

## 2018-08-13 RX ADMIN — INSULIN DETEMIR 10 UNITS: 100 INJECTION, SOLUTION SUBCUTANEOUS at 09:08

## 2018-08-13 RX ADMIN — LOSARTAN POTASSIUM 100 MG: 50 TABLET, FILM COATED ORAL at 09:08

## 2018-08-13 RX ADMIN — ERTAPENEM SODIUM 1 G: 1 INJECTION, POWDER, LYOPHILIZED, FOR SOLUTION INTRAMUSCULAR; INTRAVENOUS at 12:08

## 2018-08-13 RX ADMIN — INSULIN ASPART 2 UNITS: 100 INJECTION, SOLUTION INTRAVENOUS; SUBCUTANEOUS at 12:08

## 2018-08-13 RX ADMIN — HYDROCODONE BITARTRATE AND ACETAMINOPHEN 1 TABLET: 5; 325 TABLET ORAL at 07:08

## 2018-08-13 RX ADMIN — HYDROCODONE BITARTRATE AND ACETAMINOPHEN 1 TABLET: 5; 325 TABLET ORAL at 09:08

## 2018-08-13 RX ADMIN — ONDANSETRON 8 MG: 2 INJECTION INTRAMUSCULAR; INTRAVENOUS at 07:08

## 2018-08-13 RX ADMIN — ONDANSETRON 8 MG: 2 INJECTION INTRAMUSCULAR; INTRAVENOUS at 10:08

## 2018-08-13 NOTE — PLAN OF CARE
Progress notes reviewed. ID consulted, final cultures pending.     Treatment to continue.  Plan to d/c with  Family and [facility]  upon discharge. TN to follow up.      Hx:    Abdominal wall abscess     69 y/o female with recurrent diverticulitis with multiple abdominal surgeries in past now with abdominal wall abscess. Superficial wound culture from 8/2/2018 revealing Ecoli and Klebs oxycota-ESBL. Started On Pip/tazo 8/2. Had I + D of abdominal wall abscess on 8/10 - cultures pending. Piptazo changed to ertapenem on 8/11 pending surgical cultures. 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      Recommendations:  ---- Continue contact precautions  --- continue Ertapenem  ---- S/P Wound exploration ,excision and debridement of abdominal wound 8/10/2018  ----  Follow results intra-operative cultures that were obtained       TN met with patient at bedside. Patient AAOx3 and independent with adl's.  Discharging planning assessment completed by patient in [place] of [place].  Patient attends wound care with Dr Horton for abdominal wound and is current with [facility].       Patient would like to continue with [facility]  upon discharge.   No DME needed prior to admit. Patient has glucometer in place.  Pt states she drives and lives with son and Daughter in law, Azucena.  Daughter in law speaks English         08/13/18 1614   Discharge Reassessment   Assessment Type Discharge Planning Reassessment   Provided patient/caregiver education on the expected discharge date and the discharge plan Yes   Do you have any problems affording any of your prescribed medications? No   Discharge Plan A Home with family;Home Health   Discharge Plan B Home with family;Home Health   Can the patient answer the patient profile reliably? Yes, cognitively intact   How does the patient rate their overall health at the present  time? Good   Describe the patient's ability to walk at the present time. Minor restrictions or changes   How often would a person be available to care for the patient? Whenever needed   Number of comorbid conditions (as recorded on the chart) Three   During the past month, has the patient often been bothered by feeling down, depressed or hopeless? No   During the past month, has the patient often been bothered by little interest or pleasure in doing things? No

## 2018-08-13 NOTE — PROGRESS NOTES
[facility]  Infectious Disease  Progress Note    Patient Name: Azucena Morrison  MRN: [id number]  Admission Date: 8/2/2018  Length of Stay: 11 days  Attending Physician: Ulisses Horton MD  Primary Care Provider: Pj Sanches MD    Isolation Status: Contact  Assessment/Plan:      Drainage from wound    ----Wound culture from 8/2/2018 revealing Ecoli and Klebs oxycota-ESBL  ---- On Pip/tazo since 8/2  ---- Intraoperative cultures from 8/10 growing now E coli    Recommendations:  ---- Continue contact precautions  --- Stopped Pip/tazo 8/11. Started Ertapenem 8/11/2018. 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  ---- S/P Wound exploration ,excision and debridement of abdominal wound 8/10/2018  ----  Intra-operative cultures showing E.coli for now            Anticipated Disposition:     Thank you for your consult. I will follow-up with patient. Please contact us if you have any additional questions.    Bob Zarate MD   [facility] Infectious Disease Fellow, PGY4  ID pager [phone]  Infectious Disease  [facility]    Subjective:     Principal Problem:Abdominal wall abscess    HPI: This is a very pleasant 69 YO F from [place] who has a PMH of HTN, DM, kidney stones, recurrent diverticulitis that led to a 80% hemicolectomy in 2015. According to the patient she tells me that she was operated in later in [facility] and had a colostomy then. Later was seen bu Dr Alfaro and had a reversal of colostomy and closure in 11/2016 and other compliacted surgerires for fistula and incarcerated ventral hernia. According to the patient she had total 5 surgeries after the first surgery in [place].  According to the patient she had no abdominal wound infection till now but she did mention that she took possibly augmentin and bactrim for one year before this.  As note after  reviewing records the wound culture from 7/11/2018 was growing Ecoli and according to her home meds she was prescribed bactrim on 7/18/2018. Not sure if she took that or not.  This last Friday she started having 2-3 intensity dull abdominal discomfort with severe N/V and abdominal wound serosangious discharge. Denied any chills, no fevers and she was admitted to [facility] on 8/2/2018.  She has been on Pip/Tazo since 8/2/2018 and wound cx collected on 8/2/2018 is revealing Ecoli and ESBL Klebsiella now. ID consult called 8/6/2018 for choice of antibiotics.    Patient taken to OR on 8/10 for I + D of abdominal wound - cultures pending  Interval History: Patient seen this AM, stated that pain is better, no N/V/D    Review of Systems   Constitutional: Negative.    HENT: Negative for congestion, dental problem, ear discharge and facial swelling.    Eyes: Negative for pain, discharge and itching.   Respiratory: Negative for apnea, choking and chest tightness.    Cardiovascular: Negative for chest pain and palpitations.   Gastrointestinal: Positive for abdominal pain. Negative for constipation, diarrhea, nausea and vomiting.   Genitourinary: Negative for difficulty urinating and dyspareunia.   Musculoskeletal: Negative for arthralgias, gait problem and myalgias.   Skin: Positive for wound. Negative for color change and pallor.        Skin with dressing s/p surgery   Neurological: Negative for dizziness, facial asymmetry and headaches.   Psychiatric/Behavioral: Negative for agitation, behavioral problems and confusion.   All other systems reviewed and are negative.    Objective:     Vital Signs (Most Recent):  Temp: 97.8 °F (36.6 °C) (08/13/18 1131)  Pulse: 73 (08/13/18 1131)  Resp: 18 (08/13/18 1131)  BP: (!) 161/87 (08/13/18 1131)  SpO2: 97 % (08/13/18 0849) Vital Signs (24h Range):  Temp:  [96.4 °F (35.8 °C)-98.5 °F (36.9 °C)] 97.8 °F (36.6 °C)  Pulse:  [68-80] 73  Resp:  [15-20] 18  SpO2:  [97 %-98 %] 97 %  BP:  (131-161)/(67-92) 161/87     Weight: 77.6 kg (171 lb 1.2 oz)  Body mass index is 34.55 kg/m².    Estimated Creatinine Clearance: 60.8 mL/min (based on SCr of 1.1 mg/dL).    Physical Exam    Significant Labs:   Blood Culture:   Recent Labs   Lab  08/02/18 1918 08/02/18   1948   LABBLOO  No growth after 5 days.  No growth after 5 days.     BMP: No results for input(s): GLU, NA, K, CL, CO2, BUN, CREATININE, CALCIUM, MG in the last 48 hours.  CBC: No results for input(s): WBC, HGB, HCT, PLT in the last 48 hours.  Wound Culture:   Recent Labs   Lab  02/28/18   0910  04/18/18   0920  07/11/18   0930  08/02/18   1918  08/10/18   1400   LABAERO  STAPHYLOCOCCUS AUREUS  Moderate    ESCHERICHIA COLI  Few    ESCHERICHIA COLI  Many    ESCHERICHIA COLI  Moderate    ESCHERICHIA COLI  Few    ESCHERICHIA COLI  Many    KLEBSIELLA OXYTOCA ESBL  Moderate    GRAM NEGATIVE ZENAIDA, NON-LACTOSE   Few  Identification and susceptibility pending       All pertinent labs within the past 24 hours have been reviewed.    Significant Imaging: I have reviewed all pertinent imaging results/findings within the past 24 hours.

## 2018-08-13 NOTE — SUBJECTIVE & OBJECTIVE
Interval History: Patient seen this AM, stated that pain is better, no N/V/D    Review of Systems   Constitutional: Negative.    HENT: Negative for congestion, dental problem, ear discharge and facial swelling.    Eyes: Negative for pain, discharge and itching.   Respiratory: Negative for apnea, choking and chest tightness.    Cardiovascular: Negative for chest pain and palpitations.   Gastrointestinal: Positive for abdominal pain. Negative for constipation, diarrhea, nausea and vomiting.   Genitourinary: Negative for difficulty urinating and dyspareunia.   Musculoskeletal: Negative for arthralgias, gait problem and myalgias.   Skin: Positive for wound. Negative for color change and pallor.        Skin with dressing s/p surgery   Neurological: Negative for dizziness, facial asymmetry and headaches.   Psychiatric/Behavioral: Negative for agitation, behavioral problems and confusion.   All other systems reviewed and are negative.    Objective:     Vital Signs (Most Recent):  Temp: 97.8 °F (36.6 °C) (08/13/18 1131)  Pulse: 73 (08/13/18 1131)  Resp: 18 (08/13/18 1131)  BP: (!) 161/87 (08/13/18 1131)  SpO2: 97 % (08/13/18 0849) Vital Signs (24h Range):  Temp:  [96.4 °F (35.8 °C)-98.5 °F (36.9 °C)] 97.8 °F (36.6 °C)  Pulse:  [68-80] 73  Resp:  [15-20] 18  SpO2:  [97 %-98 %] 97 %  BP: (131-161)/(67-92) 161/87     Weight: 77.6 kg (171 lb 1.2 oz)  Body mass index is 34.55 kg/m².    Estimated Creatinine Clearance: 60.8 mL/min (based on SCr of 1.1 mg/dL).    Physical Exam    Significant Labs:   Blood Culture:   Recent Labs   Lab  08/02/18 1918 08/02/18 1948   LABBLOO  No growth after 5 days.  No growth after 5 days.     BMP: No results for input(s): GLU, NA, K, CL, CO2, BUN, CREATININE, CALCIUM, MG in the last 48 hours.  CBC: No results for input(s): WBC, HGB, HCT, PLT in the last 48 hours.  Wound Culture:   Recent Labs   Lab  02/28/18   0910  04/18/18   0920  07/11/18   0930  08/02/18   1918  08/10/18   1400   JOVITAO   STAPHYLOCOCCUS AUREUS  Moderate    ESCHERICHIA COLI  Few    ESCHERICHIA COLI  Many    ESCHERICHIA COLI  Moderate    ESCHERICHIA COLI  Few    ESCHERICHIA COLI  Many    KLEBSIELLA OXYTOCA ESBL  Moderate    GRAM NEGATIVE ZENAIDA, NON-LACTOSE   Few  Identification and susceptibility pending       All pertinent labs within the past 24 hours have been reviewed.    Significant Imaging: I have reviewed all pertinent imaging results/findings within the past 24 hours.

## 2018-08-13 NOTE — PHYSICIAN QUERY
"PT Name: Azucena Morrison  MR #: [id number]    Physician Query Form - Procedure Clarification     CDS/: Brandy E Capley               Contact information:  Spectralink:  [phone]  This form is a permanent document in the medical record.     Query Date: August 13, 2018  By submitting this query, we are merely seeking further clarification of documentation. Please utilize your independent clinical judgment when addressing the question(s) below.    The Medical record contains the following:     Indicators       Supporting Clinical Findings   Location in Medical Record   X Documentation of "Debridement"   OPERATION:    Wound exploration, excision and debridement, and packing of the wound with iodoform packing.    The patient was found to have an abscess cavity just below the umbilical area connecting to the fistula site with old Prolene stitch, which was removed.  The fascia was incised.  Infected tissue was debrided.     Op note 8/13    Documentation of "I & D"      EBL =      Other:       Excisional debridement is a surgical removal of  nonvitalized tissue, necrosis or slough. The use of a sharp instrument does not always indicate that an excisional debridement was performed.  Non excisional debridement is the scraping, washing, irrigating, brushing away or removal of loose tissue fragments.    Provider, please specify type of procedure(s) performed:    [ x ] Excisional Debridement (Specify site and depth of tissue removed)   * Site: (Specify)_____________abdominal wound , ________________________   * Depth of tissue excised:    [ x ] Skin [  x]Subcutaneous Tissue [ x ] Fascia   [ ] Muscle [  ] Tendon [ ] Bone     [  ] Non-excisional Debridement  (Specify site and depth of tissue removed)   * Site: (Specify)_____________________________________   * Depth of tissue excised:    [  ] Skin [  ]Subcutaneous Tissue [  ] Fascia   [ ] Muscle [  ] Tendon [ ] Bone       [  ] Other Procedure (Specify) " ________________________________    [  ] Clinically Undetermined

## 2018-08-13 NOTE — ASSESSMENT & PLAN NOTE
----Wound culture from 8/2/2018 revealing Ecoli and Klebs oxycota-ESBL  ---- On Pip/tazo since 8/2  ---- Intraoperative cultures from 8/10 growing now E coli    Recommendations:  ---- Continue contact precautions  --- Stopped Pip/tazo 8/11. Started Ertapenem 8/11/2018. 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  ---- S/P Wound exploration ,excision and debridement of abdominal wound 8/10/2018  ----  Intra-operative cultures showing E.coli for now

## 2018-08-13 NOTE — PLAN OF CARE
Problem: Patient Care Overview  Goal: Plan of Care Review  Outcome: Ongoing (interventions implemented as appropriate)  AAOx4, VSS, NAD.  Pain well controlled with oral pain medications.  Dressing to abdomen CDI.  BG monitoring, supplemental insulin provided.  Tolerating diet.  Ambulates in room without difficulty.  Medications administered per MAR.  Instructed to call for assistance, safety maintained.  Will continue to monitor.

## 2018-08-13 NOTE — PROGRESS NOTES
[facility] Medicine Progress Note    Primary Team: [facility] Hospitalist Team B  Attending Physician: Dr. Khoury/Elizabet  Resident: Ranjit Braun  Intern: Naseem Martinez    Subjective:      Patient awake and alert this morning. No acute events overnight.  Denies pain this morning. Dressing clean, dry, and intact with no surrounding erythema.     Denies CP, SOB. Stooling and voiding appropriately.Tolerating PO without N/D. Denies fever, chills.      Objective:     Last 24 Hour Vital Signs:  BP  Min: 131/70  Max: 148/81  Temp  Av.9 °F (36.6 °C)  Min: 96.4 °F (35.8 °C)  Max: 98.5 °F (36.9 °C)  Pulse  Av.7  Min: 68  Max: 80  Resp  Av.3  Min: 15  Max: 20  SpO2  Av.7 %  Min: 97 %  Max: 99 %  Weight  Av.6 kg (171 lb 1.2 oz)  Min: 77.6 kg (171 lb 1.2 oz)  Max: 77.6 kg (171 lb 1.2 oz)  I/O last 3 completed shifts:  In: 950 [P.O.:750; IV Piggyback:200]  Out: 601 [Urine:600; Stool:1]    Physical Examination:  General: Patient sitting up in bed in NAD  CV: RRR with no rubs murmurs or s3 or s4  Resp: CTAB with no wheezes crackles or rhonchi  Abdomen: Soft, NT, ND. Dressing covering lower abdomen c/d/i, no sign of surrounding erythema  Extremities- SCDs and bobbi hose in place, distal pulses equal and intact  Neuro: aax3    Laboratory:  Laboratory Data Reviewed: yes  Pertinent Findings:  POCT 166  H/h 8.6/26.7    Microbiology Data Reviewed: yes  Pertinent Findings:  Culture from  grew EColi and Klebsiella   Repeat Cultures show NG  Intraop cultures: GNR, susceptibility pending  Fungal cx - pending      Radiology Data Reviewed: yes  Pertinent Findings:  No new Radiology studies.    Current Medications:     Infusions:       Scheduled:   ertapenem (INVANZ) IVPB  1 g Intravenous Q24H    insulin detemir U-100  10 Units Subcutaneous Daily    losartan  100 mg Oral Daily    scopolamine  1 patch Transdermal Q3 Days        PRN:  dextrose 50%, dextrose 50%, glucagon (human recombinant), glucose, glucose,  HYDROcodone-acetaminophen, insulin aspart U-100, ondansetron, simethicone    Antibiotics and Day Number of Therapy:  Zosyn on 8/2    Lines and Day Number of Therapy:  Peripheral IV: L forearm 5d    Assessment:     Azucena Morrison is a 68 y.o.female with  Patient Active Problem List    Diagnosis Date Noted    Abdominal wall abscess 08/02/2018    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    Polyp of colon 06/04/2018    Generalized abdominal pain     LLQ pain     Diabetes with skin complication     Unspecified local infection of skin and subcutaneous tissue     Diabetes mellitus     Type 2 diabetes mellitus with skin complication     Abscess of abdominal wall 07/11/2017    Tachycardia 12/09/2016    S/P exploratory laparotomy     S/P colostomy 11/14/2016    Fistula of intestine, excluding rectum and anus 11/14/2016    Drainage from wound 08/03/2016    Essential hypertension 08/02/2016    History of hemicolectomy 05/17/2016    Status post Aiyana's procedure 05/17/2016    HTN (hypertension) 05/17/2016    Type 2 diabetes mellitus without complication 05/17/2016    Suture granuloma 05/17/2016        Plan:     1) Abdominal wounds; surgical wounds, abdominal pain  - continue wound care and surgical management per primary team  - agree with IV zosyn for abx coverage; abdominal wounds with ESBL E coli, Klebsiella  - pain and anti-emetic management per primary team  - fistulagram showed colonocutaneous fistula   - ID following- recommend continue zosyn and obtain intraoperative cultures  - abd wall abscess drained, exlap by surgery completed  - intraop cultures drawn: GNR, susceptibility to follow       2) Hypertension  - blood pressure currently at 139/92; has been mainly normotensive throughout stay  - cont losartan 100  - adding BP home meds in stepwise fashion as needed     3) diabetes mellitus type II  - hold metformin while inpatient  - initiated sliding scale in insulin with q 4 accuchecks  -Glucose currently  166  - cont levemir 10 u qam     4) microcytic anemia  - H/H 9.4/30.5 on admission, with MCV 77 -> H/h 8.6/26.7, likely 2/2 blood loss during sx  - anemia panel: Fe: 87, TIBC: 332, Sat Fe: 26; Transferrin: 224, Ferritin: 97, Folate: 10, B12: 163.  - Started on B12 1000mcg daily on 8/4     5) thrombocytosis  - likely reactive due to concurrent wound/infection burden  - continue to monitor  - downtrending 570 -> 366 -> 412    6) hyponatremia, hypotonic, resolved  - serum osmole, urine osmole, urine Na: 291, 618 and 138 respectively.  - recommend primary team reassess fluid status; patient likely does not need IVF at this time -> Na was downtrending -> now resolved     7) globulin gap  - consider HIV  - Hep A IgM, Hep B Igm, and surface antigen, HEP C Anitgen: all negative.       Code: Full  Diet: diabetic  DVT: SCD  Dispo: blood pressure and glucose control, will follow from distance      Nolvia Gusman MD  [facility]-II    [facility] Medicine Hospitalist Pager numbers:   [facility] Hospitalist Medicine Team A (Omero/Cuauhtemoc): [phone]  [facility] Hospitalist Medicine Team B (Elizabet/Peng):  [phone]

## 2018-08-13 NOTE — PROGRESS NOTES
"Surgery follow up  S/p wound exploration , with drainage , sutures exposed, serosanguinous drainage , no fecal material.  BP (!) 139/92 (Patient Position: Lying)   Pulse 70   Temp 96.4 °F (35.8 °C) (Oral)   Resp 15   Ht 4' 11" (1.499 m)   Wt 77.6 kg (171 lb 1.2 oz)   SpO2 97%   Breastfeeding? No   BMI 34.55 kg/m²   I/O last 3 completed shifts:  In: 950 [P.O.:750; IV Piggyback:200]  Out: 601 [Urine:600; Stool:1]  No intake/output data recorded.   Recent Results (from the past 336 hour(s))   CBC auto differential    Collection Time: 08/11/18  5:14 AM   Result Value Ref Range    WBC 7.32 3.90 - 12.70 K/uL    Hemoglobin 8.6 (L) 12.0 - 16.0 g/dL    Hematocrit 26.7 (L) 37.0 - 48.5 %    Platelets 412 (H) 150 - 350 K/uL   CBC auto differential    Collection Time: 08/08/18 10:24 AM   Result Value Ref Range    WBC 13.05 (H) 3.90 - 12.70 K/uL    Hemoglobin 9.6 (L) 12.0 - 16.0 g/dL    Hematocrit 30.3 (L) 37.0 - 48.5 %    Platelets 366 (H) 150 - 350 K/uL   CBC auto differential    Collection Time: 08/02/18  7:17 PM   Result Value Ref Range    WBC 8.68 3.90 - 12.70 K/uL    Hemoglobin 10.1 (L) 12.0 - 16.0 g/dL    Hematocrit 30.6 (L) 37.0 - 48.5 %    Platelets 570 (H) 150 - 350 K/uL     No results found for this or any previous visit (from the past 336 hour(s)).  Abdomen soft , dressing changed wound cleaned with wound cleanser and redressed with iodoform packing , 4 x 4 and abd pad, continue present treatment.  "

## 2018-08-13 NOTE — OP NOTE
DATE OF PROCEDURE:  08/10/2018    PREOPERATIVE DIAGNOSIS:  Diabetic infected nonhealing wound with abscess   abdominal wall, possible fistula.    POSTOPERATIVE DIAGNOSIS:  Diabetic infected nonhealing wound with abscess   abdominal wall, possible fistula.    OPERATION:  Wound exploration, excision and debridement, and packing of the   wound with iodoform packing.    SURGEON:  Ulisses Horton M.D.    ASSISTANT:  No assistant.    ANESTHESIA:  General.    SPECIMEN:  Infected tissue.    ESTIMATED BLOOD LOSS:  50 mL    PROCEDURE AND FINDINGS:  This patient was admitted with chronic nonhealing wound   draining sinus lower abdominal wall.  The patient is a known diabetic, was   worked up, and was a possibility of fistula.  The patient was treated with IV   antibiotics.  While in the hospital, the patient developed painful, tender,   swelling of the old scar area with abscess.  The patient did have elevated white   count.  The patient was seen with ID and a second opinion with Dr. Posadas.    Decision was to I and D, excision and debridement of the wound, and treat with   antibiotics and possibly closure of the wound with a secondary intention.  After   satisfactory general endotracheal anesthesia, timeout was called.  The patient   was properly recognized.  Abdomen was prepped and draped in normal sterile   manner using ChloraPrep.  An elliptical incision was made at the area of the   chronic nonhealing draining wound in the lower abdominal wall.  Elliptical   Incision was made in the area, taken down to deep subcutaneous tissue,   subcuticular clamped and bovied.  Dense extensive scar tissue was excised.    Linea alba was incised.  The patient was found to have an abscess cavity just   below the umbilical area connecting to the fistula site with old Prolene stitch,   which was removed.  The fascia was incised.  Infected tissue was debrided.    Wound was cultured for aerobic and anaerobic.  Hemostasis was  satisfactorily   maintained.  Wound was cauterized using electrocautery.  It was thoroughly   irrigated with antibiotic solution, was cauterized using electrocautery, was   thoroughly irrigated with antibiotic solution.  Hemostasis satisfactorily   maintained.  Wound was again irrigated with antibiotic solution.  The fascia was   closed using interrupted 0 Vicryl.  Wound was then packed using iodoform   packing.  Xeroform, 4 x 4, ABD pad, sterile gauze dressing was applied.  The   instrument count, sponge count, and needle count was correct.  The patient   tolerated it well.  Estimated blood loss 60 mL.    SPECIMEN REMOVED:  Infected tissue, subcutaneous tissue, skin and fascia.    Post op  DIAGNOSES:  Infected abdominal wound, diabetic; diabetes; hypertension;   chronic nonhealing abdominal wound; diverticulitis; possible diverticular   fistula.        MS/HN  dd: 08/10/2018 13:51:05 (CDT)  td: 08/10/2018 19:46:35 (CDT)  Doc ID   #[id number]  Job ID #[id number]    CC:

## 2018-08-13 NOTE — PROGRESS NOTES
.Pharmacy New Medication Education    Patient accepted medication education.    Pharmacy educated patient on name and purpose of medications and possible side effects, using the teach-back method.     dextrose 50% injection 12.5 g   dextrose 50% injection 25 g   ertapenem (INVANZ) 1 g in sodium chloride 0.9 % 100 mL IVPB (ready to mix system)   glucagon (human recombinant) injection 1 mg   glucose chewable tablet 16 g   glucose chewable tablet 24 g   HYDROcodone-acetaminophen 5-325 mg per tablet 1 tablet   insulin aspart U-100 pen 0-5 Units   insulin detemir U-100 pen 10 Units   losartan tablet 100 mg   ondansetron injection 8 mg   scopolamine 1.3-1.5 mg (1 mg over 3 days) 1 patch   simethicone chewable tablet 125 mg       Learners of pharmacy medication education included:  Patient    Patient +/- learner response:  Verbalized Understanding, Teachback

## 2018-08-14 LAB
BACTERIA SPEC AEROBE CULT: NORMAL
BACTERIA SPEC ANAEROBE CULT: NORMAL
POCT GLUCOSE: 136 MG/DL (ref 70–110)
POCT GLUCOSE: 164 MG/DL (ref 70–110)
POCT GLUCOSE: 181 MG/DL (ref 70–110)
POCT GLUCOSE: 187 MG/DL (ref 70–110)

## 2018-08-14 PROCEDURE — 63600175 PHARM REV CODE 636 W HCPCS: Performed by: INTERNAL MEDICINE

## 2018-08-14 PROCEDURE — 25000003 PHARM REV CODE 250: Performed by: STUDENT IN AN ORGANIZED HEALTH CARE EDUCATION/TRAINING PROGRAM

## 2018-08-14 PROCEDURE — 94761 N-INVAS EAR/PLS OXIMETRY MLT: CPT

## 2018-08-14 PROCEDURE — 63600175 PHARM REV CODE 636 W HCPCS: Performed by: SURGERY

## 2018-08-14 PROCEDURE — 25000003 PHARM REV CODE 250: Performed by: SURGERY

## 2018-08-14 PROCEDURE — 11000001 HC ACUTE MED/SURG PRIVATE ROOM

## 2018-08-14 RX ADMIN — HYDROCODONE BITARTRATE AND ACETAMINOPHEN 1 TABLET: 5; 325 TABLET ORAL at 12:08

## 2018-08-14 RX ADMIN — HYDROCODONE BITARTRATE AND ACETAMINOPHEN 1 TABLET: 5; 325 TABLET ORAL at 07:08

## 2018-08-14 RX ADMIN — ONDANSETRON 8 MG: 2 INJECTION INTRAMUSCULAR; INTRAVENOUS at 12:08

## 2018-08-14 RX ADMIN — ERTAPENEM SODIUM 1 G: 1 INJECTION, POWDER, LYOPHILIZED, FOR SOLUTION INTRAMUSCULAR; INTRAVENOUS at 12:08

## 2018-08-14 RX ADMIN — LOSARTAN POTASSIUM 100 MG: 50 TABLET, FILM COATED ORAL at 08:08

## 2018-08-14 RX ADMIN — INSULIN DETEMIR 10 UNITS: 100 INJECTION, SOLUTION SUBCUTANEOUS at 08:08

## 2018-08-14 RX ADMIN — ONDANSETRON 8 MG: 2 INJECTION INTRAMUSCULAR; INTRAVENOUS at 07:08

## 2018-08-14 NOTE — ASSESSMENT & PLAN NOTE
69 y/o female with recurrent diverticulitis with multiple abdominal surgeries in past now with abdominal wall abscess. Superficial wound culture from 8/2/2018 revealing Ecoli and Klebs oxycota-ESBL. Started On Pip/tazo 8/2. Had I + D of abdominal wall abscess on 8/10 - cultures pending. Piptazo changed to ertapenem on 8/11 pending surgical cultures. 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      Recommendations:  ---- Continue contact precautions  --- continue Ertapenem

## 2018-08-14 NOTE — NURSING
Notified Dr Locke and ID team of being ready to completed dressing change. ID team came to bedside and assessed state of wound. Dressing change completed as per orders. Patient tolerated well. Will cont to monitor pt and intervene prn.

## 2018-08-14 NOTE — PLAN OF CARE
Problem: Patient Care Overview  Goal: Plan of Care Review  Outcome: Ongoing (interventions implemented as appropriate)  Plan of care reviewed with patient. Dressing to abd is CDI. C/o pain treated with PRN medication. Blood sugar monitored. Encouraged to use call light to voice needs.

## 2018-08-14 NOTE — SUBJECTIVE & OBJECTIVE
Interval History: continued improved abd pain. Denies fever/chills/sweats, n/v, diarrhea, dysuria.    Review of Systems   Constitutional: Negative for chills, diaphoresis and fever.   HENT: Negative.    Eyes: Negative.    Respiratory: Negative for cough, chest tightness and shortness of breath.    Cardiovascular: Negative for chest pain, palpitations and leg swelling.   Gastrointestinal: Negative for abdominal distention, abdominal pain, constipation, diarrhea, nausea and vomiting.   Endocrine: Negative.    Genitourinary: Negative for difficulty urinating and dysuria.   Musculoskeletal: Negative for arthralgias, gait problem and myalgias.   Skin: Positive for wound. Negative for color change and pallor.        Skin with dressing s/p surgery   Allergic/Immunologic: Negative.    Neurological: Negative for dizziness, facial asymmetry and headaches.   Hematological: Negative.    Psychiatric/Behavioral: Negative for agitation, behavioral problems and confusion.   All other systems reviewed and are negative.    Objective:     Vital Signs (Most Recent):  Temp: 97.7 °F (36.5 °C) (08/14/18 0452)  Pulse: 64 (08/14/18 0452)  Resp: 18 (08/14/18 0452)  BP: (!) 155/80 (08/14/18 0452)  SpO2: 95 % (08/14/18 0446) Vital Signs (24h Range):  Temp:  [97.7 °F (36.5 °C)-99 °F (37.2 °C)] 97.7 °F (36.5 °C)  Pulse:  [64-80] 64  Resp:  [15-20] 18  SpO2:  [95 %-97 %] 95 %  BP: (155-164)/(80-87) 155/80     Weight: 74.3 kg (163 lb 12.8 oz)  Body mass index is 33.08 kg/m².    Estimated Creatinine Clearance: 58.2 mL/min (based on SCr of 1.1 mg/dL).    Physical Exam   Constitutional: She is oriented to person, place, and time. She appears well-developed and well-nourished. No distress.   HENT:   Head: Normocephalic and atraumatic.   Eyes: EOM are normal. Pupils are equal, round, and reactive to light. No scleral icterus.   Neck: Normal range of motion. Neck supple. No JVD present.   Cardiovascular: Normal rate, regular rhythm, normal heart sounds  and intact distal pulses.   Pulmonary/Chest: Effort normal and breath sounds normal. No respiratory distress. She has no wheezes. She has no rales.   Abdominal: Soft. Bowel sounds are normal. She exhibits no distension. There is no tenderness.   Midline open wound with packing in place. +SS drainage. Non tender. No surrounding erythema, induration, fluctuance   Musculoskeletal: Normal range of motion. She exhibits no edema.   Neurological: She is alert and oriented to person, place, and time.   Skin: Skin is warm and dry.   Psychiatric: She has a normal mood and affect. Her behavior is normal.       Significant Labs:   Blood Culture:   Recent Labs   Lab  08/02/18 1918 08/02/18 1948   LABBLOO  No growth after 5 days.  No growth after 5 days.     BMP: No results for input(s): GLU, NA, K, CL, CO2, BUN, CREATININE, CALCIUM, MG in the last 48 hours.  CBC: No results for input(s): WBC, HGB, HCT, PLT in the last 48 hours.  Wound Culture:   Recent Labs   Lab  02/28/18   0910  04/18/18   0920  07/11/18   0930  08/02/18   1918  08/10/18   1400   LABAERO  STAPHYLOCOCCUS AUREUS  Moderate    ESCHERICHIA COLI  Few    ESCHERICHIA COLI  Many    ESCHERICHIA COLI  Moderate    ESCHERICHIA COLI  Few    ESCHERICHIA COLI  Many    KLEBSIELLA OXYTOCA ESBL  Moderate    ESCHERICHIA COLI  Few  Susceptibility pending       All pertinent labs within the past 24 hours have been reviewed.    Significant Imaging: I have reviewed all pertinent imaging results/findings within the past 24 hours.

## 2018-08-14 NOTE — PROGRESS NOTES
[facility]  Infectious Disease  Progress Note    Patient Name: Azucena Morrison  MRN: [id number]  Admission Date: 8/2/2018  Length of Stay: 12 days  Attending Physician: Ulisses Horton MD  Primary Care Provider: Pj Sanches MD    Isolation Status: Contact  Assessment/Plan:      * Abdominal wall abscess    69 y/o female with recurrent diverticulitis with multiple abdominal surgeries in past now with abdominal wall abscess. Superficial wound culture from 8/2/2018 revealing Ecoli and Klebs oxycota-ESBL. Started On Pip/tazo 8/2. Had I + D of abdominal wall abscess on 8/10 - cultures pending. Piptazo changed to ertapenem on 8/11 pending surgical cultures. 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  ---- S/P Wound exploration ,excision and debridement of abdominal wound 8/10/2018  ---- started on Pip/tazo 8/2  ---- Intraoperative cultures from 8/10 growing ESBL Ecoli    Recommendations:  ---- Continue contact precautions  --- Stopped Pip/tazo 8/11. Started Ertapenem 8/11/2018. Will continue ertapenem and will be future antibiosis (1g q24hr). Duration of treatment dependent on future possible surgical interventions. Will touch base with surgery about their plans              Thank you for your consult. I will follow-up with patient. Please contact us if you have any additional questions.    Krishna Butt MD  Infectious Disease  [facility]    Subjective:     Principal Problem:Abdominal wall abscess    HPI: This is a very pleasant 68  CHICA EATON from [place] who has a PMH of HTN, DM, kidney stones, recurrent diverticulitis that led to a 80% hemicolectomy in 2015. According to the patient she tells me that she was operated in later in [facility] and had a colostomy then. Later was seen bu Dr Alfaro and had a reversal of colostomy and closure in 11/2016 and other compliacted surgerires for fistula and incarcerated ventral hernia. According to the patient she had total 5 surgeries after the first surgery in [place].  According to the patient she had no abdominal wound infection till now but she did mention that she took possibly augmentin and bactrim for one year before this.  As note after reviewing records the wound culture from 7/11/2018 was growing Ecoli and according to her home meds she was prescribed bactrim on 7/18/2018. Not sure if she took that or not.  This last Friday she started having 2-3 intensity dull abdominal discomfort with severe N/V and abdominal wound serosangious discharge. Denied any chills, no fevers and she was admitted to [facility] on 8/2/2018.  She has been on Pip/Tazo since 8/2/2018 and wound cx collected on 8/2/2018 is revealing Ecoli and ESBL Klebsiella now. ID consult called 8/6/2018 for choice of antibiotics.    Patient taken to OR on 8/10 for I + D of abdominal wound - cultures pending  Interval History: continued improved abd pain. Denies fever/chills/sweats, n/v, diarrhea, dysuria.    Review of Systems   Constitutional: Negative for chills, diaphoresis and fever.   HENT: Negative.    Eyes: Negative.    Respiratory: Negative for cough, chest tightness and shortness of breath.    Cardiovascular: Negative for chest pain, palpitations and leg swelling.   Gastrointestinal: Negative for abdominal distention, abdominal pain, constipation, diarrhea, nausea and vomiting.   Endocrine: Negative.    Genitourinary: Negative for difficulty urinating and dysuria.   Musculoskeletal: Negative for arthralgias, gait problem and myalgias.    Skin: Positive for wound. Negative for color change and pallor.        Skin with dressing s/p surgery   Allergic/Immunologic: Negative.    Neurological: Negative for dizziness, facial asymmetry and headaches.   Hematological: Negative.    Psychiatric/Behavioral: Negative for agitation, behavioral problems and confusion.   All other systems reviewed and are negative.    Objective:     Vital Signs (Most Recent):  Temp: 97.7 °F (36.5 °C) (08/14/18 0452)  Pulse: 64 (08/14/18 0452)  Resp: 18 (08/14/18 0452)  BP: (!) 155/80 (08/14/18 0452)  SpO2: 95 % (08/14/18 0446) Vital Signs (24h Range):  Temp:  [97.7 °F (36.5 °C)-99 °F (37.2 °C)] 97.7 °F (36.5 °C)  Pulse:  [64-80] 64  Resp:  [15-20] 18  SpO2:  [95 %-97 %] 95 %  BP: (155-164)/(80-87) 155/80     Weight: 74.3 kg (163 lb 12.8 oz)  Body mass index is 33.08 kg/m².    Estimated Creatinine Clearance: 58.2 mL/min (based on SCr of 1.1 mg/dL).    Physical Exam   Constitutional: She is oriented to person, place, and time. She appears well-developed and well-nourished. No distress.   HENT:   Head: Normocephalic and atraumatic.   Eyes: EOM are normal. Pupils are equal, round, and reactive to light. No scleral icterus.   Neck: Normal range of motion. Neck supple. No JVD present.   Cardiovascular: Normal rate, regular rhythm, normal heart sounds and intact distal pulses.   Pulmonary/Chest: Effort normal and breath sounds normal. No respiratory distress. She has no wheezes. She has no rales.   Abdominal: Soft. Bowel sounds are normal. She exhibits no distension. There is no tenderness.   Midline open wound with packing in place. +SS drainage. Non tender. No surrounding erythema, induration, fluctuance   Musculoskeletal: Normal range of motion. She exhibits no edema.   Neurological: She is alert and oriented to person, place, and time.   Skin: Skin is warm and dry.   Psychiatric: She has a normal mood and affect. Her behavior is normal.       Significant Labs:   Blood Culture:    Recent Labs   Lab  08/02/18 1918 08/02/18   1948   LABBLOO  No growth after 5 days.  No growth after 5 days.     BMP: No results for input(s): GLU, NA, K, CL, CO2, BUN, CREATININE, CALCIUM, MG in the last 48 hours.  CBC: No results for input(s): WBC, HGB, HCT, PLT in the last 48 hours.  Wound Culture:   Recent Labs   Lab  02/28/18   0910  04/18/18   0920  07/11/18   0930  08/02/18   1918  08/10/18   1400   LABAERO  STAPHYLOCOCCUS AUREUS  Moderate    ESCHERICHIA COLI  Few    ESCHERICHIA COLI  Many    ESCHERICHIA COLI  Moderate    ESCHERICHIA COLI  Few    ESCHERICHIA COLI  Many    KLEBSIELLA OXYTOCA ESBL  Moderate    ESCHERICHIA COLI  Few  Susceptibility pending       All pertinent labs within the past 24 hours have been reviewed.    Significant Imaging: I have reviewed all pertinent imaging results/findings within the past 24 hours.

## 2018-08-15 LAB
POCT GLUCOSE: 143 MG/DL (ref 70–110)
POCT GLUCOSE: 151 MG/DL (ref 70–110)
POCT GLUCOSE: 165 MG/DL (ref 70–110)
POCT GLUCOSE: 222 MG/DL (ref 70–110)

## 2018-08-15 PROCEDURE — 63600175 PHARM REV CODE 636 W HCPCS: Performed by: STUDENT IN AN ORGANIZED HEALTH CARE EDUCATION/TRAINING PROGRAM

## 2018-08-15 PROCEDURE — 63600175 PHARM REV CODE 636 W HCPCS: Performed by: SURGERY

## 2018-08-15 PROCEDURE — 25000003 PHARM REV CODE 250: Performed by: STUDENT IN AN ORGANIZED HEALTH CARE EDUCATION/TRAINING PROGRAM

## 2018-08-15 PROCEDURE — 11000001 HC ACUTE MED/SURG PRIVATE ROOM

## 2018-08-15 PROCEDURE — 63600175 PHARM REV CODE 636 W HCPCS: Performed by: INTERNAL MEDICINE

## 2018-08-15 PROCEDURE — 94761 N-INVAS EAR/PLS OXIMETRY MLT: CPT

## 2018-08-15 PROCEDURE — 25000003 PHARM REV CODE 250: Performed by: SURGERY

## 2018-08-15 RX ORDER — HYDROCHLOROTHIAZIDE 25 MG/1
25 TABLET ORAL DAILY
Status: DISCONTINUED | OUTPATIENT
Start: 2018-08-15 | End: 2018-08-18 | Stop reason: HOSPADM

## 2018-08-15 RX ADMIN — LOSARTAN POTASSIUM 100 MG: 50 TABLET, FILM COATED ORAL at 08:08

## 2018-08-15 RX ADMIN — HYDROCHLOROTHIAZIDE 25 MG: 25 TABLET ORAL at 12:08

## 2018-08-15 RX ADMIN — INSULIN DETEMIR 10 UNITS: 100 INJECTION, SOLUTION SUBCUTANEOUS at 08:08

## 2018-08-15 RX ADMIN — ERTAPENEM SODIUM 1 G: 1 INJECTION, POWDER, LYOPHILIZED, FOR SOLUTION INTRAMUSCULAR; INTRAVENOUS at 12:08

## 2018-08-15 RX ADMIN — HYDROCODONE BITARTRATE AND ACETAMINOPHEN 1 TABLET: 5; 325 TABLET ORAL at 04:08

## 2018-08-15 RX ADMIN — INSULIN DETEMIR 2 UNITS: 100 INJECTION, SOLUTION SUBCUTANEOUS at 12:08

## 2018-08-15 RX ADMIN — INSULIN ASPART 2 UNITS: 100 INJECTION, SOLUTION INTRAVENOUS; SUBCUTANEOUS at 12:08

## 2018-08-15 RX ADMIN — ONDANSETRON 8 MG: 2 INJECTION INTRAMUSCULAR; INTRAVENOUS at 04:08

## 2018-08-15 RX ADMIN — SCOPOLAMINE 1 PATCH: 1 PATCH, EXTENDED RELEASE TRANSDERMAL at 08:08

## 2018-08-15 NOTE — PLAN OF CARE
Problem: Patient Care Overview  Goal: Plan of Care Review  Patient on RA with sats as documented.

## 2018-08-15 NOTE — PROGRESS NOTES
"Surgery follow up  BP (!) 177/92   Pulse 88   Temp 96.9 °F (36.1 °C)   Resp 17   Ht 4' 11" (1.499 m)   Wt 74.3 kg (163 lb 12.8 oz)   SpO2 98%   Breastfeeding? No   BMI 33.08 kg/m²   I/O last 3 completed shifts:  In: -   Out: 150 [Urine:150]  I/O this shift:  In: 200 [P.O.:200]  Out: 350 [Urine:350]  No nausea or vomiting  Abdomen soft ,     Dressing changed , wound repacked     Will obtain ugi with small bowel follow through to r/o small bowel fistula.  "

## 2018-08-15 NOTE — PLAN OF CARE
Problem: Patient Care Overview  Goal: Plan of Care Review  Outcome: Ongoing (interventions implemented as appropriate)  Pt awake and alert.  Complaints of pain and nausea with relief from scheduled norco and zofran.  Dressing to abdomen CDI.  BG monitoring, supplemental insulin provided.  Tolerating diet.  Ambulates in room without difficulty.  Medications administered per MAR.  Instructed to call for assistance, safety maintained.  Will continue to monitor.

## 2018-08-15 NOTE — PROGRESS NOTES
[facility]  Infectious Disease  Progress Note    Patient Name: Azucena Morrison  MRN: [id number]  Admission Date: 8/2/2018  Length of Stay: 13 days  Attending Physician: Ulisses Horton MD  Primary Care Provider: Pj Sanches MD    Isolation Status: Contact  Assessment/Plan:      * Abdominal wall abscess    67 y/o female with recurrent diverticulitis with multiple abdominal surgeries in past now with abdominal wall abscess. Superficial wound culture from 8/2/2018 revealing Ecoli and Klebs oxycota-ESBL. Started On Pip/tazo 8/2. Had I + D of abdominal wall abscess on 8/10 - cultures pending. Piptazo changed to ertapenem on 8/11. Intraop cultures grew ESBL Ecoli. 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  ---- S/P Wound exploration ,excision and debridement of abdominal wound 8/10/2018  ---- started on Pip/tazo 8/2, discontinued on 8/11. Started Ertapenem on 8/11  ---- Intraoperative cultures from 8/10 growing ESBL Ecoli    Final Recommendations:  ---- Continue contact precautions  --- Continue ertapenem 1g q24hr for 2 weeks postop from surgery date 8/10 (end date 8/23)  --- Place PICC line for IV antibiotics  consult CM/SW for HH IV antibiosis  --- continue local wound care  --- Follow up with ID at [facility]  --- Obtain weekly CBC, CMP  fax lab results to Dr. Bradley at [phone]          Thank you for your consult. I will sign off. Please contact us if you have any additional questions.    Krishna Butt MD  Infectious Disease  [facility]    Subjective:     Principal Problem:Abdominal wall abscess    HPI: This is a very pleasant 67 YO F from [place] who has a PMH of HTN, DM, kidney stones, recurrent diverticulitis that led to a 80% hemicolectomy in 2015. According to the patient she tells me that she was operated in later in [facility] and had a colostomy then. Later was seen bu Dr Alfaro and had a reversal of colostomy and closure in 11/2016 and other compliacted surgerires for fistula and incarcerated ventral hernia. According to the patient she had total 5 surgeries after the first surgery in [place].  According to the patient she had no abdominal wound infection till now but she did mention that she took possibly augmentin and bactrim for one year before this.  As note after reviewing records the wound culture from 7/11/2018 was growing Ecoli and according to her home meds she was prescribed bactrim on 7/18/2018. Not sure if she took that or not.  This last Friday she started having 2-3 intensity dull abdominal discomfort with severe N/V and abdominal wound serosangious discharge. Denied any chills, no fevers and she was admitted to [facility] on 8/2/2018.  She has been on Pip/Tazo since 8/2/2018 and wound cx collected on 8/2/2018 is revealing Ecoli and ESBL Klebsiella now. ID consult called 8/6/2018 for choice of antibiotics.    Patient taken to OR on 8/10 for I + D of abdominal wound - cultures pending  Interval History: pt doing well this AM; denies fever/chills/sweats, abd pain, diarrhea, dysuria, sob, cp. Tolerating PO diet    Review of Systems   Constitutional: Negative for chills, diaphoresis and fever.   HENT: Negative.    Eyes: Negative.    Respiratory: Negative for cough, chest tightness and shortness of breath.    Cardiovascular: Negative for chest pain, palpitations and leg swelling.   Gastrointestinal: Negative for abdominal distention, abdominal pain, constipation, diarrhea, nausea and vomiting.   Endocrine: Negative.     Genitourinary: Negative for difficulty urinating and dysuria.   Musculoskeletal: Negative for arthralgias, gait problem and myalgias.   Skin: Negative for color change and pallor.   Allergic/Immunologic: Negative.    Neurological: Negative for dizziness, facial asymmetry and headaches.   Hematological: Negative.    Psychiatric/Behavioral: Negative for agitation, behavioral problems and confusion.   All other systems reviewed and are negative.    Objective:     Vital Signs (Most Recent):  Temp: 97.5 °F (36.4 °C) (08/15/18 0430)  Pulse: 78 (08/15/18 0844)  Resp: 17 (08/15/18 0844)  BP: 138/84 (08/15/18 0430)  SpO2: 96 % (08/15/18 0844) Vital Signs (24h Range):  Temp:  [97.1 °F (36.2 °C)-98.2 °F (36.8 °C)] 97.5 °F (36.4 °C)  Pulse:  [72-78] 78  Resp:  [17-19] 17  SpO2:  [95 %-99 %] 96 %  BP: (128-183)/(78-94) 138/84     Weight: 74.3 kg (163 lb 12.8 oz)  Body mass index is 33.08 kg/m².    Estimated Creatinine Clearance: 58.2 mL/min (based on SCr of 1.1 mg/dL).    Physical Exam   Constitutional: She is oriented to person, place, and time. She appears well-developed and well-nourished. No distress.   HENT:   Head: Normocephalic and atraumatic.   Eyes: EOM are normal. Pupils are equal, round, and reactive to light. No scleral icterus.   Neck: Normal range of motion. Neck supple. No JVD present.   Cardiovascular: Normal rate, regular rhythm, normal heart sounds and intact distal pulses.   Pulmonary/Chest: Effort normal and breath sounds normal. No respiratory distress. She has no wheezes. She has no rales.   Abdominal: Soft. Bowel sounds are normal. She exhibits no distension. There is no tenderness.   Midline wound with dressing in place that is clean, and dry. Abdomen nontender and without drainage around wound or surrounding erythema, induration, fluctuance   Musculoskeletal: Normal range of motion. She exhibits no edema.   Neurological: She is alert and oriented to person, place, and time.   Skin: Skin is warm and  dry.   Psychiatric: She has a normal mood and affect. Her behavior is normal.   Nursing note and vitals reviewed.      Significant Labs:   Blood Culture:   Recent Labs   Lab  08/02/18 1918 08/02/18   1948   LABBLOO  No growth after 5 days.  No growth after 5 days.     BMP: No results for input(s): GLU, NA, K, CL, CO2, BUN, CREATININE, CALCIUM, MG in the last 48 hours.  CBC: No results for input(s): WBC, HGB, HCT, PLT in the last 48 hours.  Wound Culture:   Recent Labs   Lab  02/28/18   0910  04/18/18   0920  07/11/18   0930  08/02/18   1918  08/10/18   1400   LABAERO  STAPHYLOCOCCUS AUREUS  Moderate    ESCHERICHIA COLI  Few    ESCHERICHIA COLI  Many    ESCHERICHIA COLI  Moderate    ESCHERICHIA COLI  Few    ESCHERICHIA COLI  Many    KLEBSIELLA OXYTOCA ESBL  Moderate    ESCHERICHIA COLI ESBL  Few       All pertinent labs within the past 24 hours have been reviewed.    Significant Imaging: I have reviewed all pertinent imaging results/findings within the past 24 hours.

## 2018-08-15 NOTE — PLAN OF CARE
Problem: Patient Care Overview  Goal: Plan of Care Review  Outcome: Ongoing (interventions implemented as appropriate)  Plan of care discussed with patient. Pain treated with PRN medication. Blood sugar monitored. Dressing to ABD is CDI. Resting in bed with eyes closed for most of shift. Voices needs when they arise. Will continue to monitor.

## 2018-08-15 NOTE — ASSESSMENT & PLAN NOTE
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  ---- S/P Wound exploration ,excision and debridement of abdominal wound 8/10/2018  ---- started on Pip/tazo 8/2, discontinued on 8/11. Started Ertapenem on 8/11  ---- Intraoperative cultures from 8/10 growing ESBL Ecoli    Final Recommendations:  ---- Continue contact precautions  --- Continue ertapenem 1g q24hr for 2 weeks postop from surgery date 8/10 (end date 8/23)  --- Place PICC line for IV antibiotics  consult CM/SW for HH IV antibiosis  --- continue local wound care  --- Follow up with ID at [facility] [place] [facility]  --- Obtain weekly CBC, CMP  fax lab results to Dr. Bradley at [phone]

## 2018-08-15 NOTE — SUBJECTIVE & OBJECTIVE
Interval History: pt doing well this AM; denies fever/chills/sweats, abd pain, diarrhea, dysuria, sob, cp. Tolerating PO diet    Review of Systems   Constitutional: Negative for chills, diaphoresis and fever.   HENT: Negative.    Eyes: Negative.    Respiratory: Negative for cough, chest tightness and shortness of breath.    Cardiovascular: Negative for chest pain, palpitations and leg swelling.   Gastrointestinal: Negative for abdominal distention, abdominal pain, constipation, diarrhea, nausea and vomiting.   Endocrine: Negative.    Genitourinary: Negative for difficulty urinating and dysuria.   Musculoskeletal: Negative for arthralgias, gait problem and myalgias.   Skin: Negative for color change and pallor.   Allergic/Immunologic: Negative.    Neurological: Negative for dizziness, facial asymmetry and headaches.   Hematological: Negative.    Psychiatric/Behavioral: Negative for agitation, behavioral problems and confusion.   All other systems reviewed and are negative.    Objective:     Vital Signs (Most Recent):  Temp: 97.5 °F (36.4 °C) (08/15/18 0430)  Pulse: 78 (08/15/18 0844)  Resp: 17 (08/15/18 0844)  BP: 138/84 (08/15/18 0430)  SpO2: 96 % (08/15/18 0844) Vital Signs (24h Range):  Temp:  [97.1 °F (36.2 °C)-98.2 °F (36.8 °C)] 97.5 °F (36.4 °C)  Pulse:  [72-78] 78  Resp:  [17-19] 17  SpO2:  [95 %-99 %] 96 %  BP: (128-183)/(78-94) 138/84     Weight: 74.3 kg (163 lb 12.8 oz)  Body mass index is 33.08 kg/m².    Estimated Creatinine Clearance: 58.2 mL/min (based on SCr of 1.1 mg/dL).    Physical Exam   Constitutional: She is oriented to person, place, and time. She appears well-developed and well-nourished. No distress.   HENT:   Head: Normocephalic and atraumatic.   Eyes: EOM are normal. Pupils are equal, round, and reactive to light. No scleral icterus.   Neck: Normal range of motion. Neck supple. No JVD present.   Cardiovascular: Normal rate, regular rhythm, normal heart sounds and intact distal pulses.    Pulmonary/Chest: Effort normal and breath sounds normal. No respiratory distress. She has no wheezes. She has no rales.   Abdominal: Soft. Bowel sounds are normal. She exhibits no distension. There is no tenderness.   Midline wound with dressing in place that is clean, and dry. Abdomen nontender and without drainage around wound or surrounding erythema, induration, fluctuance   Musculoskeletal: Normal range of motion. She exhibits no edema.   Neurological: She is alert and oriented to person, place, and time.   Skin: Skin is warm and dry.   Psychiatric: She has a normal mood and affect. Her behavior is normal.   Nursing note and vitals reviewed.      Significant Labs:   Blood Culture:   Recent Labs   Lab  08/02/18 1918 08/02/18 1948   LABBLOO  No growth after 5 days.  No growth after 5 days.     BMP: No results for input(s): GLU, NA, K, CL, CO2, BUN, CREATININE, CALCIUM, MG in the last 48 hours.  CBC: No results for input(s): WBC, HGB, HCT, PLT in the last 48 hours.  Wound Culture:   Recent Labs   Lab  02/28/18   0910  04/18/18   0920  07/11/18   0930  08/02/18   1918  08/10/18   1400   LABAERO  STAPHYLOCOCCUS AUREUS  Moderate    ESCHERICHIA COLI  Few    ESCHERICHIA COLI  Many    ESCHERICHIA COLI  Moderate    ESCHERICHIA COLI  Few    ESCHERICHIA COLI  Many    KLEBSIELLA OXYTOCA ESBL  Moderate    ESCHERICHIA COLI ESBL  Few       All pertinent labs within the past 24 hours have been reviewed.    Significant Imaging: I have reviewed all pertinent imaging results/findings within the past 24 hours.

## 2018-08-15 NOTE — ASSESSMENT & PLAN NOTE
67 y/o female with recurrent diverticulitis with multiple abdominal surgeries in past now with abdominal wall abscess. Superficial wound culture from 8/2/2018 revealing Ecoli and Klebs oxycota-ESBL. Started On Pip/tazo 8/2. Had I + D of abdominal wall abscess on 8/10 - cultures pending. Piptazo changed to ertapenem on 8/11. Intraop cultures grew ESBL Ecoli. 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      Recommendations:  ---- Continue contact precautions  --- continue Ertapenem

## 2018-08-15 NOTE — PROGRESS NOTES
[facility] Medicine Progress Note    Primary Team: \[facility]\"" Hospitalist Team B  Attending Physician: Dr. Khoury/Elizabet  Resident: Ranjit Braun  Intern: Naseem Martinez    Subjective:      Patient was seen and examined at the bedside. Alert and conversant today. States she was feeling well. Denies nausea or vomiting overnight. Denies fevers.      Objective:     Last 24 Hour Vital Signs:  BP  Min: 128/78  Max: 183/94  Temp  Av.7 °F (36.5 °C)  Min: 97.1 °F (36.2 °C)  Max: 98.2 °F (36.8 °C)  Pulse  Av.8  Min: 68  Max: 77  Resp  Av  Min: 17  Max: 19  SpO2  Av.9 %  Min: 95 %  Max: 99 %  No intake/output data recorded.    Physical Examination:  General: Patient sitting up in bed in NAD  CV: RRR with no rubs murmurs or s3 or s4  Resp: CTAB with no wheezes crackles or rhonchi  Abdomen: Soft, NT, ND. Dressing covering lower abdomen c/d/i, no sign of surrounding erythema  Extremities- SCDs and bobbi hose in place, distal pulses equal and intact  Neuro: aax3    Laboratory:  Laboratory Data Reviewed: yes  No new labs    Microbiology Data Reviewed: yes  Pertinent Findings:  Culture from  grew EColi and Klebsiella   Repeat Cultures show NG  Intraop cultures: GNR, susceptibility pending  Fungal cx - pending  Wound Culture from 8/10 revealing E.Coli ESBL    Radiology Data Reviewed: yes  Pertinent Findings:  No new Radiology studies.    Current Medications:     Infusions:       Scheduled:   ertapenem (INVANZ) IVPB  1 g Intravenous Q24H    insulin detemir U-100  10 Units Subcutaneous Daily    losartan  100 mg Oral Daily    scopolamine  1 patch Transdermal Q3 Days        PRN:  dextrose 50%, dextrose 50%, glucagon (human recombinant), glucose, glucose, HYDROcodone-acetaminophen, insulin aspart U-100, ondansetron, simethicone    Antibiotics and Day Number of Therapy:  Zosyn on  -   Ertapenem  - present    Lines and Day Number of Therapy:  Peripheral IV    Assessment:     Azucena Morrison is a 68  y.o.female with  Patient Active Problem List    Diagnosis Date Noted    Infection due to ESBL-producing Escherichia coli     Abdominal wall abscess 08/02/2018    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    Polyp of colon 06/04/2018    Generalized abdominal pain     LLQ pain     Diabetes with skin complication     Unspecified local infection of skin and subcutaneous tissue     Diabetes mellitus     Type 2 diabetes mellitus with skin complication     Abscess of abdominal wall 07/11/2017    Tachycardia 12/09/2016    S/P exploratory laparotomy     S/P colostomy 11/14/2016    Fistula of intestine, excluding rectum and anus 11/14/2016    Drainage from wound 08/03/2016    Essential hypertension 08/02/2016    History of hemicolectomy 05/17/2016    Status post Aiyana's procedure 05/17/2016    HTN (hypertension) 05/17/2016    Type 2 diabetes mellitus without complication 05/17/2016    Suture granuloma 05/17/2016        Plan:     1) Abdominal wounds; surgical wounds, abdominal pain  - continue wound care and surgical management per primary team  - initially on IV zosyn for abx coverage; abdominal wounds with ESBL E coli, Klebsiella  - subsequent culture with E. Coli ESBL  - pain and anti-emetic management per primary team  - fistulagram showed colonocutaneous fistula   - ID following- switched to ertapenem on 8/11, agree  - abd wall abscess drained, exlap by surgery completed    2) Hypertension  - blood pressure currently at 138/74; has been mainly normotensive throughout stay  - cont losartan 100  - adding BP home meds in stepwise fashion as needed     3) Diabetes mellitus type II  - hold metformin while inpatient  - initiated sliding scale in insulin with q 4 accuchecks  - glucose currently 151  - cont levemir 10 u qam     4) Microcytic anemia  - H/H 9.4/30.5 on admission, with MCV 77 -> H/h 8.6/26.7, likely 2/2 blood loss during sx  - anemia panel: Fe: 87, TIBC: 332, Sat Fe: 26; Transferrin: 224, Ferritin: 97,  Folate: 10, B12: 163.  - Started on B12 1000mcg daily on 8/4     5) Thrombocytosis  - likely reactive due to concurrent wound/infection burden  - continue to monitor  - downtrending 570 -> 366 -> 412  - recommend repeat CBC tomorrow 8/16    6) Hyponatremia, hypotonic, resolved  - serum osmole, urine osmole, urine Na: 291, 618 and 138 respectively.  - recommend primary team reassess fluid status; patient likely does not need IVF at this time -> Na was downtrending -> now resolved     7) Globulin gap  - consider HIV  - Hep A IgM, Hep B Igm, and surface antigen, HEP C Anitgen: all negative.     Code: Full  Diet: diabetic  DVT: SCD  Dispo: blood pressure and glucose control, will follow from distance    Aj Archer DO  [facility] -[facility]    hospitals Medicine Hospitalist Pager numbers:   hospitals Hospitalist Medicine Team A (Omero/Cuauhtemoc): [phone]  hospitals Hospitalist Medicine Team B (Elizabet/Peng):  [phone]

## 2018-08-16 LAB
POCT GLUCOSE: 142 MG/DL (ref 70–110)
POCT GLUCOSE: 159 MG/DL (ref 70–110)
POCT GLUCOSE: 189 MG/DL (ref 70–110)
POCT GLUCOSE: 209 MG/DL (ref 70–110)

## 2018-08-16 PROCEDURE — 02HV33Z INSERTION OF INFUSION DEVICE INTO SUPERIOR VENA CAVA, PERCUTANEOUS APPROACH: ICD-10-PCS | Performed by: SURGERY

## 2018-08-16 PROCEDURE — 25000003 PHARM REV CODE 250: Performed by: STUDENT IN AN ORGANIZED HEALTH CARE EDUCATION/TRAINING PROGRAM

## 2018-08-16 PROCEDURE — 97803 MED NUTRITION INDIV SUBSEQ: CPT

## 2018-08-16 PROCEDURE — 94761 N-INVAS EAR/PLS OXIMETRY MLT: CPT

## 2018-08-16 PROCEDURE — 25000003 PHARM REV CODE 250: Performed by: SURGERY

## 2018-08-16 PROCEDURE — 11000001 HC ACUTE MED/SURG PRIVATE ROOM

## 2018-08-16 PROCEDURE — 63600175 PHARM REV CODE 636 W HCPCS: Performed by: SURGERY

## 2018-08-16 PROCEDURE — 63600175 PHARM REV CODE 636 W HCPCS: Performed by: INTERNAL MEDICINE

## 2018-08-16 RX ADMIN — HYDROCODONE BITARTRATE AND ACETAMINOPHEN 1 TABLET: 5; 325 TABLET ORAL at 07:08

## 2018-08-16 RX ADMIN — LOSARTAN POTASSIUM 100 MG: 50 TABLET, FILM COATED ORAL at 09:08

## 2018-08-16 RX ADMIN — HYDROCHLOROTHIAZIDE 25 MG: 25 TABLET ORAL at 09:08

## 2018-08-16 RX ADMIN — INSULIN ASPART 2 UNITS: 100 INJECTION, SOLUTION INTRAVENOUS; SUBCUTANEOUS at 12:08

## 2018-08-16 RX ADMIN — ERTAPENEM SODIUM 1 G: 1 INJECTION, POWDER, LYOPHILIZED, FOR SOLUTION INTRAMUSCULAR; INTRAVENOUS at 01:08

## 2018-08-16 RX ADMIN — ONDANSETRON 8 MG: 2 INJECTION INTRAMUSCULAR; INTRAVENOUS at 07:08

## 2018-08-16 RX ADMIN — HYDROCODONE BITARTRATE AND ACETAMINOPHEN 1 TABLET: 5; 325 TABLET ORAL at 12:08

## 2018-08-16 NOTE — PROGRESS NOTES
" [facility]  Adult Nutrition  Progress Note    SUMMARY       Recommendations    Recommendation/Intervention:   1. Continue to encourage good intake at meals    Goals:   Pt will consume at least 75% intake at meals  Nutrition Goal Status: progressing towards goal  Communication of RD Recs: reviewed with RN(Sandra)    Reason for Assessment  Reason for Assessment: RD follow-up  Diagnosis: (abd wall abscess)  Relevant Medical History: DM, HTN, kidney stone, cholecystectomy, hernia repair  General Information Comments: Pt on 2000 ADA diet. Receiving PICC line at visit. RN reports pt with fair intake at recent meals.   Nutrition Discharge Planning: pt to d/c on ADA diet    Nutrition Risk Screen  Nutrition Risk Screen: no indicators present    Nutrition/Diet History  Food Preferences: no Voodoo or cultural food prefs identified  Do you have any cultural, spiritual, Voodoo conflicts, given your current situation?: NONE    Anthropometrics  Temp: 98.5 °F (36.9 °C)  Height Method: Stated  Height: 4' 11" (149.9 cm)  Height (inches): 59 in  Weight Method: Bed Scale  Weight: 74.3 kg (163 lb 12.8 oz)  Weight (lb): 163.8 lb  Ideal Body Weight (IBW), Female: 95 lb  % Ideal Body Weight, Female (lb): 180.55 lb  BMI (Calculated): 34.7  BMI Grade: 30 - 34.9- obesity - grade I     Lab/Procedures/Meds  Pertinent Labs Reviewed: reviewed  Pertinent Labs Comments: Glu 239H, Alb 2.4L  Pertinent Medications Reviewed: reviewed  Pertinent Medications Comments: insulin    Physical Findings/Assessment  Overall Physical Appearance: overweight  Tubes: (none)  Oral/Mouth Cavity: WDL  Skin: (Barrett 22-abd incision)    Estimated/Assessed Needs  Weight Used For Calorie Calculations: 74.3 kg (163 lb 12.8 oz)  Energy Calorie Requirements (kcal): 1858  Energy Need Method: Kcal/kg(25 kcal/kg)  Protein Requirements: 74g (1.0g/kg)  Weight Used For Protein Calculations: 74.3 kg (163 lb 12.8 oz)  Fluid Need Method: RDA Method  RDA " Method (mL): 1858  CHO Requirement: 175g    Nutrition Prescription Ordered  Current Diet Order: 2000 ADA    Evaluation of Received Nutrient/Fluid Intake  Energy Calories Required: meeting needs  Protein Required: meeting needs  Fluid Required: meeting needs  Comments: LBM 8/14  % Intake of Estimated Energy Needs: 25 - 50 %  % Meal Intake: 25 - 50 %    Nutrition Risk  Level of Risk/Frequency of Follow-up: (1xweekly)     Assessment and Plan    * Abdominal wall abscess    Contributing Nutrition Diagnosis  Increased energy needs    Related to (etiology):   Wound healing    Signs and Symptoms (as evidenced by):   Abdominal wound    Interventions/Recommendations (treatment strategy):  See recs    Nutrition Diagnosis Status:   Improving               Monitor and Evaluation  Food and Nutrient Intake: food and beverage intake  Food and Nutrient Adminstration: diet order  Physical Activity and Function: nutrition-related ADLs and IADLs  Anthropometric Measurements: weight  Biochemical Data, Medical Tests and Procedures: electrolyte and renal panel  Nutrition-Focused Physical Findings: overall appearance     Nutrition Follow-Up  RD Follow-up?: Yes

## 2018-08-16 NOTE — PROCEDURES
"Azucena Morrison is a 68 y.o. female patient.    Temp: 98.5 °F (36.9 °C) (08/16/18 0826)  Pulse: 74 (08/16/18 0830)  Resp: 17 (08/16/18 0830)  BP: (!) 151/85 (08/16/18 0826)  SpO2: 97 % (08/16/18 0830)  Weight: 74.3 kg (163 lb 12.8 oz) (08/14/18 0452)  Height: 4' 11" (149.9 cm) (08/10/18 2300)    PICC  Date/Time: 8/16/2018 10:55 AM  Consent Done: Yes  Time out: Immediately prior to procedure a time out was called to verify the correct patient, procedure, equipment, support staff and site/side marked as required  Indications: med administration and vascular access  Anesthesia: local infiltration  Local anesthetic: lidocaine 1% without epinephrine  Anesthetic Total (mL): 5  Preparation: other (alcohol swabs)  Skin prep agent dried: skin prep agent completely dried prior to procedure  Sterile barriers: all five maximum sterile barriers used - cap, mask, sterile gown, sterile gloves, and large sterile sheet  Hand hygiene: hand hygiene performed prior to central venous catheter insertion  Location details: left brachial  Catheter type: double lumen  Catheter size: 5 Fr  Catheter Length: 39cm    Ultrasound guidance: yes  Vessel Caliber: medium and patent, compressibility normal  Needle advanced into vessel with real time Ultrasound guidance.  Guidewire confirmed in vessel.  Sterile sheath used.  Manometry: esophageal manometry  Number of attempts: 1  Post-procedure: blood return through all ports, chlorhexidine patch and sterile dressing applied  Estimated blood loss (mL): 2            Lyle Calderon  8/16/2018  "

## 2018-08-16 NOTE — PLAN OF CARE
Problem: Nutrition, Imbalanced: Inadequate Oral Intake (Adult)  Goal: Improved Oral Intake  Patient will demonstrate the desired outcomes by discharge/transition of care.  Outcome: Ongoing (interventions implemented as appropriate)  Recommendation/Intervention:   1. Continue to encourage good intake at meals    Goals:   Pt will consume at least 75% intake at meals  Nutrition Goal Status: progressing towards goal  Communication of RD Recs: reviewed with RN(Sandra)

## 2018-08-16 NOTE — NURSING
Radiology called to say that they were unable to do the Upper GI, but were able to do the Small Bowel.  Called Dr. Horton to advise, left message on his answering machine.  Will call surgery as well.

## 2018-08-17 LAB
ANION GAP SERPL CALC-SCNC: 9 MMOL/L
BASOPHILS # BLD AUTO: 0.02 K/UL
BASOPHILS NFR BLD: 0.3 %
BUN SERPL-MCNC: 16 MG/DL
CALCIUM SERPL-MCNC: 9.4 MG/DL
CHLORIDE SERPL-SCNC: 101 MMOL/L
CO2 SERPL-SCNC: 27 MMOL/L
CREAT SERPL-MCNC: 1 MG/DL
DIFFERENTIAL METHOD: ABNORMAL
EOSINOPHIL # BLD AUTO: 0.3 K/UL
EOSINOPHIL NFR BLD: 3.7 %
ERYTHROCYTE [DISTWIDTH] IN BLOOD BY AUTOMATED COUNT: 15.3 %
EST. GFR  (AFRICAN AMERICAN): >60 ML/MIN/1.73 M^2
EST. GFR  (NON AFRICAN AMERICAN): 58 ML/MIN/1.73 M^2
GLUCOSE SERPL-MCNC: 138 MG/DL
HCT VFR BLD AUTO: 29.1 %
HGB BLD-MCNC: 9.2 G/DL
LYMPHOCYTES # BLD AUTO: 1.9 K/UL
LYMPHOCYTES NFR BLD: 23.4 %
MCH RBC QN AUTO: 24.7 PG
MCHC RBC AUTO-ENTMCNC: 31.6 G/DL
MCV RBC AUTO: 78 FL
MONOCYTES # BLD AUTO: 0.5 K/UL
MONOCYTES NFR BLD: 6.7 %
NEUTROPHILS # BLD AUTO: 5.2 K/UL
NEUTROPHILS NFR BLD: 65.5 %
PLATELET # BLD AUTO: 390 K/UL
PMV BLD AUTO: 7.7 FL
POCT GLUCOSE: 105 MG/DL (ref 70–110)
POCT GLUCOSE: 158 MG/DL (ref 70–110)
POCT GLUCOSE: 173 MG/DL (ref 70–110)
POCT GLUCOSE: 175 MG/DL (ref 70–110)
POTASSIUM SERPL-SCNC: 3.8 MMOL/L
RBC # BLD AUTO: 3.72 M/UL
SODIUM SERPL-SCNC: 137 MMOL/L
WBC # BLD AUTO: 7.92 K/UL

## 2018-08-17 PROCEDURE — 85025 COMPLETE CBC W/AUTO DIFF WBC: CPT

## 2018-08-17 PROCEDURE — 63600175 PHARM REV CODE 636 W HCPCS: Performed by: INTERNAL MEDICINE

## 2018-08-17 PROCEDURE — 25000003 PHARM REV CODE 250: Performed by: SURGERY

## 2018-08-17 PROCEDURE — 63600175 PHARM REV CODE 636 W HCPCS: Performed by: SURGERY

## 2018-08-17 PROCEDURE — 11000001 HC ACUTE MED/SURG PRIVATE ROOM

## 2018-08-17 PROCEDURE — 80048 BASIC METABOLIC PNL TOTAL CA: CPT

## 2018-08-17 PROCEDURE — 94761 N-INVAS EAR/PLS OXIMETRY MLT: CPT

## 2018-08-17 PROCEDURE — 25500020 PHARM REV CODE 255: Performed by: SURGERY

## 2018-08-17 PROCEDURE — 25000003 PHARM REV CODE 250: Performed by: STUDENT IN AN ORGANIZED HEALTH CARE EDUCATION/TRAINING PROGRAM

## 2018-08-17 RX ORDER — LANOLIN ALCOHOL/MO/W.PET/CERES
1000 CREAM (GRAM) TOPICAL DAILY
Status: DISCONTINUED | OUTPATIENT
Start: 2018-08-18 | End: 2018-08-18 | Stop reason: HOSPADM

## 2018-08-17 RX ADMIN — LOSARTAN POTASSIUM 100 MG: 50 TABLET, FILM COATED ORAL at 08:08

## 2018-08-17 RX ADMIN — ERTAPENEM SODIUM 1 G: 1 INJECTION, POWDER, LYOPHILIZED, FOR SOLUTION INTRAMUSCULAR; INTRAVENOUS at 01:08

## 2018-08-17 RX ADMIN — HYDROCODONE BITARTRATE AND ACETAMINOPHEN 1 TABLET: 5; 325 TABLET ORAL at 06:08

## 2018-08-17 RX ADMIN — IOHEXOL 75 ML: 350 INJECTION, SOLUTION INTRAVENOUS at 12:08

## 2018-08-17 RX ADMIN — IOHEXOL 30 ML: 350 INJECTION, SOLUTION INTRAVENOUS at 10:08

## 2018-08-17 RX ADMIN — HYDROCODONE BITARTRATE AND ACETAMINOPHEN 1 TABLET: 5; 325 TABLET ORAL at 04:08

## 2018-08-17 RX ADMIN — HYDROCHLOROTHIAZIDE 25 MG: 25 TABLET ORAL at 08:08

## 2018-08-17 RX ADMIN — ONDANSETRON 8 MG: 2 INJECTION INTRAMUSCULAR; INTRAVENOUS at 06:08

## 2018-08-17 NOTE — PROGRESS NOTES
"Surgery follow up  BP (!) 144/75 (BP Location: Right arm, Patient Position: Lying)   Pulse 77   Temp 98.1 °F (36.7 °C) (Oral)   Resp 18   Ht 4' 11" (1.499 m)   Wt 67.9 kg (149 lb 11.1 oz)   SpO2 96%   Breastfeeding? No   BMI 30.23 kg/m²   I/O last 3 completed shifts:  In: 100 [IV Piggyback:100]  Out: 300 [Urine:300]  No intake/output data recorded.  Has had ct scan with oral contrast ,   Will review x rays with DR Cadena to further plan the treatment plan. To continue wound care and iv antibiotics for now.  "

## 2018-08-17 NOTE — PROGRESS NOTES
[facility] Medicine Progress Note    Primary Team: [facility] Hospitalist Team B  Attending Physician: Dr. Khoury/Elizabet  Resident: Ranjit Braun  Intern: Naseem Martinez    Subjective:      Patient NPO this morning for small bowel follow through. Otherwise reports her appetite is poor. Denies chest pain, shortness of breath, or subjective fever/chills overnight. No other complaints this AM.      Objective:     Last 24 Hour Vital Signs:  BP  Min: 141/84  Max: 145/85  Temp  Av.1 °F (36.7 °C)  Min: 98 °F (36.7 °C)  Max: 98.2 °F (36.8 °C)  Pulse  Av  Min: 72  Max: 87  Resp  Av  Min: 18  Max: 18  SpO2  Av.6 %  Min: 96 %  Max: 97 %  Weight  Av.9 kg (149 lb 11.1 oz)  Min: 67.9 kg (149 lb 11.1 oz)  Max: 67.9 kg (149 lb 11.1 oz)  I/O last 3 completed shifts:  In: 100 [IV Piggyback:100]  Out: 300 [Urine:300]    Physical Examination:  General: Patient sitting up in bed in NAD  CV: NRRR with no rubs murmurs or s3 or s4  Resp: CTAB with no wheezes crackles or rhonchi  Abdomen: Soft, NT, ND. Dressing covering lower abdomen c/d/i, Extremities: distal pulses equal and intact  Neuro: aax3    Laboratory:  Laboratory Data Reviewed: yes  No new labs- ordered CBC/BMP today.     Microbiology Data Reviewed: yes  Pertinent Findings:  Culture from  grew EColi and Klebsiella   Repeat Cultures show NG  Intraop cultures: GNR, susceptibility pending  Fungal cx - pending  Wound Culture from 8/10 revealing E.Coli ESBL    Radiology Data Reviewed: yes  Pertinent Findings:  No new Radiology studies.    Current Medications:     Infusions:       Scheduled:   ertapenem (INVANZ) IVPB  1 g Intravenous Q24H    hydroCHLOROthiazide  25 mg Oral Daily    insulin detemir U-100  12 Units Subcutaneous Daily    losartan  100 mg Oral Daily    scopolamine  1 patch Transdermal Q3 Days        PRN:  dextrose 50%, dextrose 50%, glucagon (human recombinant), glucose, glucose, HYDROcodone-acetaminophen, insulin aspart U-100, ondansetron,  simethicone    Antibiotics and Day Number of Therapy:  Zosyn on 8/2 - 8/11  Ertapenem 8/11 - present    Lines and Day Number of Therapy:  Peripheral IV    Assessment:     Azucena Morrison is a 68 y.o.female with  Patient Active Problem List    Diagnosis Date Noted    Infection due to ESBL-producing Escherichia coli     Abdominal wall abscess 08/02/2018    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    Polyp of colon 06/04/2018    Generalized abdominal pain     LLQ pain     Diabetes with skin complication     Unspecified local infection of skin and subcutaneous tissue     Diabetes mellitus     Type 2 diabetes mellitus with skin complication     Abscess of abdominal wall 07/11/2017    Tachycardia 12/09/2016    S/P exploratory laparotomy     S/P colostomy 11/14/2016    Fistula of intestine, excluding rectum and anus 11/14/2016    Drainage from wound 08/03/2016    Essential hypertension 08/02/2016    History of hemicolectomy 05/17/2016    Status post Aiyana's procedure 05/17/2016    HTN (hypertension) 05/17/2016    Type 2 diabetes mellitus without complication 05/17/2016    Suture granuloma 05/17/2016        Plan:     1) Abdominal wounds; surgical wounds, abdominal pain  - continue wound care and surgical management per primary team  - initially on IV zosyn for abx coverage; abdominal wounds with ESBL E coli, Klebsiella  - subsequent culture with E. Coli ESBL  - pain and anti-emetic management per primary team  - fistulagram showed colonocutaneous fistula   - ID following- switched to ertapenem on 8/11, continue treatment course per primary/ID  - abd wall abscess drained, exlap by surgery completed, small bowel follow through today    2) Hypertension  - blood pressure currently well controlled; continue losartan and hctz      3) Diabetes mellitus type II  - hold metformin while inpatient  - initiated sliding scale in insulin with q 4 accuchecks  - glucose doing well  - cont levemir 10 u qam     4) Microcytic  anemia  - H/H 9.4/30.5 on admission, with MCV 77 -> H/h 8.6/26.7, likely 2/2 blood loss during sx  - anemia panel: Fe: 87, TIBC: 332, Sat Fe: 26; Transferrin: 224, Ferritin: 97, Folate: 10, B12: 163.  - Started on B12 1000mcg daily on 8/4     5) Thrombocytosis  - likely reactive due to concurrent wound/infection burden  - continue to monitor  - downtrending 570 -> 366 -> 412  - recommend repeat CBC tomorrow 8/16    6) Hyponatremia, hypotonic, resolved  - serum osmole, urine osmole, urine Na: 291, 618 and 138 respectively.  - recommend primary team reassess fluid status; patient likely does not need IVF at this time -> Na was downtrending -> now resolved     7) Globulin gap  - consider HIV  - Hep A IgM, Hep B Igm, and surface antigen, HEP C Anitgen: all negative.     Code: Full  Diet: diabetic. NPO for exam today  DVT: SCD  Dispo: blood pressure and glucose control, will follow from distance    Wellington Church MD  [facility]-III    [facility] Medicine Hospitalist Pager numbers:   [facility] Hospitalist Medicine Team A (Omero/Cuauhtemoc): [phone]  [facility] Hospitalist Medicine Team B (Elizabet/Peng):  [phone]

## 2018-08-17 NOTE — PLAN OF CARE
Problem: Patient Care Overview  Goal: Plan of Care Review  Outcome: Ongoing (interventions implemented as appropriate)  Pt had ABD CT SCAN  Today as ordered. VSS. PICC patent to SUNG. On ADA diet. Dr Horton reported that pt will need further extensive surgery  And probably another colostomy.Pt appears to be in NAD.No c/o pain. Blood glucose WNL.

## 2018-08-17 NOTE — PROGRESS NOTES
"Surgery follow up  BP (!) 145/85 (Patient Position: Lying)   Pulse 77   Temp 98.2 °F (36.8 °C) (Oral)   Resp 18   Ht 4' 11" (1.499 m)   Wt 67.9 kg (149 lb 11.1 oz)   SpO2 97%   Breastfeeding? No   BMI 30.23 kg/m²   I/O last 3 completed shifts:  In: 500 [P.O.:200; IV Piggyback:300]  Out: 350 [Urine:350]  No intake/output data recorded.  Recent Results (from the past 336 hour(s))   CBC auto differential    Collection Time: 08/11/18  5:14 AM   Result Value Ref Range    WBC 7.32 3.90 - 12.70 K/uL    Hemoglobin 8.6 (L) 12.0 - 16.0 g/dL    Hematocrit 26.7 (L) 37.0 - 48.5 %    Platelets 412 (H) 150 - 350 K/uL   CBC auto differential    Collection Time: 08/08/18 10:24 AM   Result Value Ref Range    WBC 13.05 (H) 3.90 - 12.70 K/uL    Hemoglobin 9.6 (L) 12.0 - 16.0 g/dL    Hematocrit 30.3 (L) 37.0 - 48.5 %    Platelets 366 (H) 150 - 350 K/uL     No results found for this or any previous visit (from the past 336 hour(s)).  Abdomen soft ,   Patient ate breakfast and could not get GI series for small bowel follow through.  Will get it done in am , picc line in place ,   "

## 2018-08-17 NOTE — PLAN OF CARE
Problem: Patient Care Overview  Goal: Plan of Care Review  Outcome: Ongoing (interventions implemented as appropriate)  Pt on RA with documented sats.  Will continue to monitor.\

## 2018-08-17 NOTE — PLAN OF CARE
Progress notes reviewed, patient for small bowel follow through today, test unable to be completed yesterday.    Patient will need IV home infusion  Till 8/24/18.   PICC line in place. Evangelist with [facility] has educated patient and  Daughter in [place], [place].  Patient current with [facility].   Patient will need wound care appointments [place]. TN attempted to schedule appointment, no answer. Message left.      Patient likely to d/c over weekend.  Ambulatory referral to  orders needed to complete HH SN IV home infusion. Message left to Dr Clifford cooley. [place] to follow up.         08/17/18 0730   Discharge Reassessment   Assessment Type Discharge Planning Reassessment   Provided patient/caregiver education on the expected discharge date and the discharge plan Yes   Do you have any problems affording any of your prescribed medications? TBD   Discharge Plan A Home with family;Home Health   Discharge Plan B Home with family;Home Health   Patient choice form signed by patient/caregiver Yes   How does the patient rate their overall health at the present time? Good   How often would a person be available to care for the patient? Whenever needed   During the past month, has the patient often been bothered by feeling down, depressed or hopeless? No   During the past month, has the patient often been bothered by little interest or pleasure in doing things? No

## 2018-08-18 VITALS
RESPIRATION RATE: 16 BRPM | SYSTOLIC BLOOD PRESSURE: 130 MMHG | BODY MASS INDEX: 29.19 KG/M2 | HEIGHT: 59 IN | TEMPERATURE: 99 F | DIASTOLIC BLOOD PRESSURE: 65 MMHG | HEART RATE: 89 BPM | OXYGEN SATURATION: 97 % | WEIGHT: 144.81 LBS

## 2018-08-18 LAB
POCT GLUCOSE: 147 MG/DL (ref 70–110)
POCT GLUCOSE: 220 MG/DL (ref 70–110)

## 2018-08-18 PROCEDURE — 25000003 PHARM REV CODE 250: Performed by: STUDENT IN AN ORGANIZED HEALTH CARE EDUCATION/TRAINING PROGRAM

## 2018-08-18 PROCEDURE — 63600175 PHARM REV CODE 636 W HCPCS: Performed by: INTERNAL MEDICINE

## 2018-08-18 PROCEDURE — 94761 N-INVAS EAR/PLS OXIMETRY MLT: CPT

## 2018-08-18 RX ADMIN — LOSARTAN POTASSIUM 100 MG: 50 TABLET, FILM COATED ORAL at 10:08

## 2018-08-18 RX ADMIN — HYDROCHLOROTHIAZIDE 25 MG: 25 TABLET ORAL at 10:08

## 2018-08-18 RX ADMIN — CYANOCOBALAMIN TAB 1000 MCG 1000 MCG: 1000 TAB at 10:08

## 2018-08-18 RX ADMIN — INSULIN ASPART 2 UNITS: 100 INJECTION, SOLUTION INTRAVENOUS; SUBCUTANEOUS at 01:08

## 2018-08-18 RX ADMIN — ERTAPENEM SODIUM 1 G: 1 INJECTION, POWDER, LYOPHILIZED, FOR SOLUTION INTRAMUSCULAR; INTRAVENOUS at 01:08

## 2018-08-18 RX ADMIN — SCOPOLAMINE 1 PATCH: 1 PATCH, EXTENDED RELEASE TRANSDERMAL at 10:08

## 2018-08-18 NOTE — PLAN OF CARE
Patient discharge instructions given and reviewed in Congolese.   Med rec reviewed with  Patient. Patient verbalized understanding.Education provided on new medication and diagnosis and follow-up appointment. IV home infusion set up per CM. PIV d/catarina tip intact. Pt tolerated well.  Awaiting transportation home, son to come  patient soon.      Patient has no hard script in chart.  Call placed to [facility] covering for Dr. Horton.  No answer.  Patient states she rather take tylenol. Informed to call MD office on Monday for script, if needed.

## 2018-08-18 NOTE — PROGRESS NOTES
[facility]  Surgical Oncology  Progress Note    Subjective:     Interval History: no new changes    Post-Op Info:  Procedure(s) (LRB):  INCISION, ABDOMINAL WALL (N/A)   8 Days Post-Op      Medications:  Continuous Infusions:  Scheduled Meds:   cyanocobalamin  1,000 mcg Oral Daily    ertapenem (INVANZ) IVPB  1 g Intravenous Q24H    hydroCHLOROthiazide  25 mg Oral Daily    insulin detemir U-100  12 Units Subcutaneous Daily    losartan  100 mg Oral Daily    scopolamine  1 patch Transdermal Q3 Days     PRN Meds:dextrose 50%, dextrose 50%, glucagon (human recombinant), glucose, glucose, HYDROcodone-acetaminophen, insulin aspart U-100, ondansetron, simethicone     Objective:     Vital Signs (Most Recent):  Temp: 98.6 °F (37 °C) (08/18/18 1144)  Pulse: 89 (08/18/18 1144)  Resp: 18 (08/18/18 1144)  BP: 130/65 (08/18/18 1144)  SpO2: 96 % (08/18/18 0845) Vital Signs (24h Range):  Temp:  [97.3 °F (36.3 °C)-98.9 °F (37.2 °C)] 98.6 °F (37 °C)  Pulse:  [72-89] 89  Resp:  [16-18] 18  SpO2:  [95 %-96 %] 96 %  BP: (123-140)/(65-88) 130/65       Intake/Output Summary (Last 24 hours) at 8/18/2018 1301  Last data filed at 8/17/2018 1327  Gross per 24 hour   Intake 100 ml   Output --   Net 100 ml       Physical Exam    Stable    Will examine wound and review CT  Significant Labs:  reviewed    Significant Diagnostics:      Assessment/Plan:       Will review Ct scan    continye current management    Courtney Bell MD  Surgical Oncology  [facility]

## 2018-08-18 NOTE — PLAN OF CARE
Problem: Patient Care Overview  Goal: Plan of Care Review  PLAN OF CARE REVIEWED W/ PT. AAOX3 REMAINS ON CONTACT ISOLATION  LEFT UPPER ARM PICC W/ DSG CDI . IV ABX CONTINUED AS ORDERED . ABD WOUND DSG CDI . AMBULATES WITHOUT DIFFICULTY . DENIES PAIN THROUGHOUT THE NIGHT . SAFETY PRECAUTIONS MAINTAINED CALL LIGHT IN REACH.

## 2018-08-18 NOTE — PLAN OF CARE
TN forwarded HH orders to [facility], Maureen [phone]/[place] [phone] via [facility] [place]. Evangelist with [email or web address], educated pt and [place] MCCLAIN bedside on how to perform home IV Infusions/PICC Care. Pt received today's dose of IV Abx here in hospital prior to discharge. Home IV  Abx Infusions to be delivered to pt's home later this afternoon. [facility], [facility]en accepted the pt and will see pt on Sunday 8//19/18.    No DME ordered    Pt's nurse, Gene, informed of above and will go over medications/signs and symptoms prior to discharge       08/18/18 1436   Final Note   Assessment Type Final Discharge Note   Discharge Disposition Home-Health  ([facility])   What phone number can be called within the next 1-3 days to see how you are doing after discharge? [phone]   Hospital Follow Up  Appt(s) scheduled? No  (Offices closed for weekend. Patient to schedule own follow up appointments.)   [facility] Referral Info   Post Acute Recommendation Home-care  ([facility] IV Infusions)     Christine Espinal, RN Transitional Navigator  [phone]

## 2018-08-20 ENCOUNTER — LAB VISIT (OUTPATIENT)
Dept: LAB | Facility: HOSPITAL | Age: 68
End: 2018-08-20
Attending: INTERNAL MEDICINE
Payer: MEDICARE

## 2018-08-20 DIAGNOSIS — A49.8 NECROBACILLOSIS: Primary | ICD-10-CM

## 2018-08-20 LAB
ALBUMIN SERPL BCP-MCNC: 2.8 G/DL
ALP SERPL-CCNC: 97 U/L
ALT SERPL W/O P-5'-P-CCNC: 15 U/L
ANION GAP SERPL CALC-SCNC: 13 MMOL/L
AST SERPL-CCNC: 18 U/L
BASOPHILS # BLD AUTO: 0.03 K/UL
BASOPHILS NFR BLD: 0.3 %
BILIRUB SERPL-MCNC: 0.3 MG/DL
BUN SERPL-MCNC: 22 MG/DL
CALCIUM SERPL-MCNC: 10.1 MG/DL
CHLORIDE SERPL-SCNC: 99 MMOL/L
CO2 SERPL-SCNC: 25 MMOL/L
CREAT SERPL-MCNC: 1.1 MG/DL
DIFFERENTIAL METHOD: ABNORMAL
EOSINOPHIL # BLD AUTO: 0.3 K/UL
EOSINOPHIL NFR BLD: 3 %
ERYTHROCYTE [DISTWIDTH] IN BLOOD BY AUTOMATED COUNT: 15.8 %
EST. GFR  (AFRICAN AMERICAN): >60 ML/MIN/1.73 M^2
EST. GFR  (NON AFRICAN AMERICAN): 52.1 ML/MIN/1.73 M^2
GLUCOSE SERPL-MCNC: 175 MG/DL
HCT VFR BLD AUTO: 33 %
HGB BLD-MCNC: 10.1 G/DL
IMM GRANULOCYTES # BLD AUTO: 0.05 K/UL
IMM GRANULOCYTES NFR BLD AUTO: 0.6 %
LYMPHOCYTES # BLD AUTO: 1.9 K/UL
LYMPHOCYTES NFR BLD: 22.2 %
MCH RBC QN AUTO: 25.1 PG
MCHC RBC AUTO-ENTMCNC: 30.6 G/DL
MCV RBC AUTO: 82 FL
MONOCYTES # BLD AUTO: 0.5 K/UL
MONOCYTES NFR BLD: 5.8 %
NEUTROPHILS # BLD AUTO: 5.9 K/UL
NEUTROPHILS NFR BLD: 68.1 %
NRBC BLD-RTO: 0 /100 WBC
PLATELET # BLD AUTO: 477 K/UL
PMV BLD AUTO: 8.8 FL
POTASSIUM SERPL-SCNC: 3.9 MMOL/L
PROT SERPL-MCNC: 8.4 G/DL
RBC # BLD AUTO: 4.03 M/UL
SODIUM SERPL-SCNC: 137 MMOL/L
WBC # BLD AUTO: 8.68 K/UL

## 2018-08-20 PROCEDURE — 85025 COMPLETE CBC W/AUTO DIFF WBC: CPT

## 2018-08-20 PROCEDURE — 80053 COMPREHEN METABOLIC PANEL: CPT

## 2018-08-21 NOTE — DISCHARGE SUMMARY
DATE OF ADMISSION:  08/02/2018    DATE OF DISCHARGE:  08/18/2018    HISTORY OF PRESENT ILLNESS:  This 68-year-old female was admitted through the   Emergency Room with history of having increased abdominal pain associated with   nausea and increased drainage from the lower midline abdominal wound.  The   patient was being followed as an outpatient in a Wound Center complained of   increased drainage from the wound.  The patient was worked up in the Emergency   Room, underwent a CT scan of the abdomen with oral contrast.  The patient   originally had rectal contrast with possible colocutaneous fistula.  The patient   was admitted and started on IV antibiotics and with control of diabetes the   patient's white count was within normal limits.  The patient was seen in   consultation with Dr. Posadas.  The patient underwent wound exploration with   excision and debridement of the wound and was found to have abscess of the   abdominal wall which was drained and infected tissue was removed and was sent   for culture.  The patient was also seen by Infectious Disease and started on IV   antibiotics.  The patient continued on local wound care, diabetic care and IV   antibiotics as recommended by Infectious Disease.  The patient's wound was   healing much better with decreased drainage.  The patient continued on IV   antibiotics per recommendation of Infectious Disease and arrangement was made to   give Retarpen injection, Invanz IV piggyback 1 g q. 24 hours, arrangements were   made for continued IV antibiotics as an outpatient.  PICC line was placed.  The   patient was discharged on 08/18/2018 for continued wound care as an outpatient   in the Wound Center with home health care, including wound care Monday,   Wednesday, Friday and patient to be seen in the Wound Center on Wednesday,   patient to continue home medication and follow with me.    DISCHARGE DIAGNOSES:  Diabetic infected nonhealing abdominal wound, chronic    colocutaneous fistula, diverticulitis status post multiple abdominal surgeries.      MS/HN  dd: 08/20/2018 21:00:40 (CDT)  td: 08/21/2018 02:06:49 (CDT)  Doc ID   #[id number]  Job ID #[id number]    CC:     [id number]

## 2018-08-27 ENCOUNTER — LAB VISIT (OUTPATIENT)
Dept: LAB | Facility: HOSPITAL | Age: 68
End: 2018-08-27
Attending: INTERNAL MEDICINE
Payer: MEDICARE

## 2018-08-27 DIAGNOSIS — T81.41XA POSTOPERATIVE STITCH ABSCESS: Primary | ICD-10-CM

## 2018-08-27 LAB
ALBUMIN SERPL BCP-MCNC: 3 G/DL
ALP SERPL-CCNC: 90 U/L
ALT SERPL W/O P-5'-P-CCNC: 15 U/L
ANION GAP SERPL CALC-SCNC: 11 MMOL/L
AST SERPL-CCNC: 15 U/L
BASOPHILS # BLD AUTO: 0.05 K/UL
BASOPHILS NFR BLD: 0.7 %
BILIRUB SERPL-MCNC: 0.1 MG/DL
BUN SERPL-MCNC: 14 MG/DL
CALCIUM SERPL-MCNC: 9.1 MG/DL
CHLORIDE SERPL-SCNC: 103 MMOL/L
CO2 SERPL-SCNC: 25 MMOL/L
CREAT SERPL-MCNC: 1.1 MG/DL
CRP SERPL-MCNC: 37.7 MG/L
DIFFERENTIAL METHOD: ABNORMAL
EOSINOPHIL # BLD AUTO: 0.2 K/UL
EOSINOPHIL NFR BLD: 2.7 %
ERYTHROCYTE [DISTWIDTH] IN BLOOD BY AUTOMATED COUNT: 15.2 %
ERYTHROCYTE [SEDIMENTATION RATE] IN BLOOD BY WESTERGREN METHOD: 108 MM/HR
EST. GFR  (AFRICAN AMERICAN): >60 ML/MIN/1.73 M^2
EST. GFR  (NON AFRICAN AMERICAN): 52.1 ML/MIN/1.73 M^2
GLUCOSE SERPL-MCNC: 157 MG/DL
HCT VFR BLD AUTO: 30.3 %
HGB BLD-MCNC: 9.3 G/DL
IMM GRANULOCYTES # BLD AUTO: 0.01 K/UL
IMM GRANULOCYTES NFR BLD AUTO: 0.1 %
LYMPHOCYTES # BLD AUTO: 1.9 K/UL
LYMPHOCYTES NFR BLD: 26.1 %
MCH RBC QN AUTO: 24.7 PG
MCHC RBC AUTO-ENTMCNC: 30.7 G/DL
MCV RBC AUTO: 80 FL
MONOCYTES # BLD AUTO: 0.4 K/UL
MONOCYTES NFR BLD: 6.2 %
NEUTROPHILS # BLD AUTO: 4.6 K/UL
NEUTROPHILS NFR BLD: 64.2 %
NRBC BLD-RTO: 0 /100 WBC
PLATELET # BLD AUTO: 438 K/UL
PMV BLD AUTO: 9.2 FL
POTASSIUM SERPL-SCNC: 3.6 MMOL/L
PROT SERPL-MCNC: 7.8 G/DL
RBC # BLD AUTO: 3.77 M/UL
SODIUM SERPL-SCNC: 139 MMOL/L
WBC # BLD AUTO: 7.1 K/UL

## 2018-08-27 PROCEDURE — 80053 COMPREHEN METABOLIC PANEL: CPT

## 2018-08-27 PROCEDURE — 85025 COMPLETE CBC W/AUTO DIFF WBC: CPT

## 2018-08-27 PROCEDURE — 85652 RBC SED RATE AUTOMATED: CPT

## 2018-08-27 PROCEDURE — 86140 C-REACTIVE PROTEIN: CPT

## 2018-08-29 ENCOUNTER — HOSPITAL ENCOUNTER (OUTPATIENT)
Dept: WOUND CARE | Facility: HOSPITAL | Age: 68
Discharge: HOME | End: 2018-08-29
Attending: SURGERY
Payer: MEDICARE

## 2018-08-29 VITALS
DIASTOLIC BLOOD PRESSURE: 81 MMHG | SYSTOLIC BLOOD PRESSURE: 142 MMHG | HEIGHT: 59 IN | WEIGHT: 144 LBS | BODY MASS INDEX: 29.03 KG/M2 | HEART RATE: 97 BPM | TEMPERATURE: 99 F

## 2018-08-29 PROCEDURE — 99214 OFFICE O/P EST MOD 30 MIN: CPT

## 2018-08-29 RX ORDER — ONDANSETRON HYDROCHLORIDE 8 MG/1
8 TABLET, FILM COATED ORAL EVERY 8 HOURS PRN
Qty: 24 TABLET | Refills: 1 | Status: SHIPPED | OUTPATIENT
Start: 2018-08-29 | End: 2019-01-09

## 2018-08-29 RX ORDER — ZOLPIDEM TARTRATE 5 MG/1
5 TABLET ORAL NIGHTLY PRN
Qty: 24 TABLET | Refills: 0 | Status: SHIPPED | OUTPATIENT
Start: 2018-08-29 | End: 2019-01-09

## 2018-09-04 ENCOUNTER — LAB VISIT (OUTPATIENT)
Dept: LAB | Facility: HOSPITAL | Age: 68
End: 2018-09-04
Attending: SURGERY
Payer: MEDICARE

## 2018-09-04 DIAGNOSIS — L02.211 ABSCESS OF ABDOMINAL WALL: Primary | ICD-10-CM

## 2018-09-04 LAB
ALBUMIN SERPL BCP-MCNC: 3 G/DL
ALP SERPL-CCNC: 89 U/L
ALT SERPL W/O P-5'-P-CCNC: 17 U/L
ANION GAP SERPL CALC-SCNC: 10 MMOL/L
AST SERPL-CCNC: 19 U/L
BASOPHILS # BLD AUTO: 0.04 K/UL
BASOPHILS NFR BLD: 0.5 %
BILIRUB SERPL-MCNC: 0.3 MG/DL
BUN SERPL-MCNC: 16 MG/DL
CALCIUM SERPL-MCNC: 9.9 MG/DL
CHLORIDE SERPL-SCNC: 101 MMOL/L
CO2 SERPL-SCNC: 27 MMOL/L
CREAT SERPL-MCNC: 1 MG/DL
CRP SERPL-MCNC: 36.3 MG/L
DIFFERENTIAL METHOD: ABNORMAL
EOSINOPHIL # BLD AUTO: 0.3 K/UL
EOSINOPHIL NFR BLD: 3.7 %
ERYTHROCYTE [DISTWIDTH] IN BLOOD BY AUTOMATED COUNT: 16.2 %
ERYTHROCYTE [SEDIMENTATION RATE] IN BLOOD BY WESTERGREN METHOD: 93 MM/HR
EST. GFR  (AFRICAN AMERICAN): >60 ML/MIN/1.73 M^2
EST. GFR  (NON AFRICAN AMERICAN): 58.4 ML/MIN/1.73 M^2
GLUCOSE SERPL-MCNC: 129 MG/DL
HCT VFR BLD AUTO: 31.2 %
HGB BLD-MCNC: 9.9 G/DL
IMM GRANULOCYTES # BLD AUTO: 0.03 K/UL
IMM GRANULOCYTES NFR BLD AUTO: 0.4 %
LYMPHOCYTES # BLD AUTO: 1.7 K/UL
LYMPHOCYTES NFR BLD: 23.3 %
MCH RBC QN AUTO: 25.5 PG
MCHC RBC AUTO-ENTMCNC: 31.7 G/DL
MCV RBC AUTO: 80 FL
MONOCYTES # BLD AUTO: 0.6 K/UL
MONOCYTES NFR BLD: 8.1 %
NEUTROPHILS # BLD AUTO: 4.7 K/UL
NEUTROPHILS NFR BLD: 64 %
NRBC BLD-RTO: 0 /100 WBC
PLATELET # BLD AUTO: 358 K/UL
PMV BLD AUTO: 9.2 FL
POTASSIUM SERPL-SCNC: 3.7 MMOL/L
PROT SERPL-MCNC: 7.8 G/DL
RBC # BLD AUTO: 3.88 M/UL
SODIUM SERPL-SCNC: 138 MMOL/L
WBC # BLD AUTO: 7.31 K/UL

## 2018-09-04 PROCEDURE — 80053 COMPREHEN METABOLIC PANEL: CPT

## 2018-09-04 PROCEDURE — 85025 COMPLETE CBC W/AUTO DIFF WBC: CPT

## 2018-09-04 PROCEDURE — 85652 RBC SED RATE AUTOMATED: CPT

## 2018-09-04 PROCEDURE — 86140 C-REACTIVE PROTEIN: CPT

## 2018-09-04 PROCEDURE — 36415 COLL VENOUS BLD VENIPUNCTURE: CPT | Mod: PO

## 2018-09-05 ENCOUNTER — TELEPHONE (OUTPATIENT)
Dept: ADMINISTRATIVE | Facility: CLINIC | Age: 68
End: 2018-09-05

## 2018-09-05 ENCOUNTER — HOSPITAL ENCOUNTER (OUTPATIENT)
Dept: WOUND CARE | Facility: HOSPITAL | Age: 68
Discharge: HOME | End: 2018-09-05
Attending: SURGERY
Payer: MEDICARE

## 2018-09-05 VITALS
SYSTOLIC BLOOD PRESSURE: 162 MMHG | HEIGHT: 59 IN | TEMPERATURE: 98 F | HEART RATE: 98 BPM | WEIGHT: 144 LBS | BODY MASS INDEX: 29.03 KG/M2 | DIASTOLIC BLOOD PRESSURE: 78 MMHG

## 2018-09-05 DIAGNOSIS — L02.211 ABDOMINAL WALL ABSCESS: ICD-10-CM

## 2018-09-05 DIAGNOSIS — E11.628 DIABETES WITH SKIN COMPLICATION: Primary | ICD-10-CM

## 2018-09-05 PROCEDURE — 87076 CULTURE ANAEROBE IDENT EACH: CPT

## 2018-09-05 PROCEDURE — 87186 SC STD MICRODIL/AGAR DIL: CPT

## 2018-09-05 PROCEDURE — 87077 CULTURE AEROBIC IDENTIFY: CPT

## 2018-09-05 PROCEDURE — 99213 OFFICE O/P EST LOW 20 MIN: CPT

## 2018-09-05 PROCEDURE — 87075 CULTR BACTERIA EXCEPT BLOOD: CPT

## 2018-09-05 PROCEDURE — 87070 CULTURE OTHR SPECIMN AEROBIC: CPT

## 2018-09-05 NOTE — PATIENT INSTRUCTIONS
Continue IV antibiotics 1 week. Keep abdominal dressing clean and dry. Follow-up Dr. Horton Wednesday 9/12/18.

## 2018-09-05 NOTE — PROGRESS NOTES
Much of the documentation for this visit was completed in the [facility] system. Please see the attached documentation for further details about this patient's care.     Sandra Coto RN

## 2018-09-07 LAB — BACTERIA SPEC AEROBE CULT: NORMAL

## 2018-09-10 ENCOUNTER — LAB VISIT (OUTPATIENT)
Dept: LAB | Facility: HOSPITAL | Age: 68
End: 2018-09-10
Attending: SURGERY
Payer: MEDICARE

## 2018-09-10 DIAGNOSIS — L02.211 ABSCESS OF ABDOMINAL WALL: Primary | ICD-10-CM

## 2018-09-10 LAB
ALBUMIN SERPL BCP-MCNC: 3.1 G/DL
ALP SERPL-CCNC: 91 U/L
ALT SERPL W/O P-5'-P-CCNC: 21 U/L
ANION GAP SERPL CALC-SCNC: 14 MMOL/L
AST SERPL-CCNC: 28 U/L
BACTERIA SPEC ANAEROBE CULT: NORMAL
BASOPHILS # BLD AUTO: 0.02 K/UL
BASOPHILS NFR BLD: 0.3 %
BILIRUB SERPL-MCNC: 0.1 MG/DL
BUN SERPL-MCNC: 16 MG/DL
CALCIUM SERPL-MCNC: 9.4 MG/DL
CHLORIDE SERPL-SCNC: 104 MMOL/L
CO2 SERPL-SCNC: 19 MMOL/L
CREAT SERPL-MCNC: 1 MG/DL
CRP SERPL-MCNC: 36.4 MG/L
DIFFERENTIAL METHOD: ABNORMAL
EOSINOPHIL # BLD AUTO: 0.2 K/UL
EOSINOPHIL NFR BLD: 2.6 %
ERYTHROCYTE [DISTWIDTH] IN BLOOD BY AUTOMATED COUNT: 16.4 %
ERYTHROCYTE [SEDIMENTATION RATE] IN BLOOD BY WESTERGREN METHOD: 79 MM/HR
EST. GFR  (AFRICAN AMERICAN): >60 ML/MIN/1.73 M^2
EST. GFR  (NON AFRICAN AMERICAN): 58.4 ML/MIN/1.73 M^2
GLUCOSE SERPL-MCNC: 180 MG/DL
HCT VFR BLD AUTO: 31.4 %
HGB BLD-MCNC: 9.4 G/DL
LYMPHOCYTES # BLD AUTO: 1.6 K/UL
LYMPHOCYTES NFR BLD: 23.2 %
MCH RBC QN AUTO: 24.7 PG
MCHC RBC AUTO-ENTMCNC: 29.9 G/DL
MCV RBC AUTO: 82 FL
MONOCYTES # BLD AUTO: 0.4 K/UL
MONOCYTES NFR BLD: 6.3 %
NEUTROPHILS # BLD AUTO: 4.6 K/UL
NEUTROPHILS NFR BLD: 67.2 %
NRBC BLD-RTO: 0 /100 WBC
PLATELET # BLD AUTO: 345 K/UL
PMV BLD AUTO: 8.9 FL
POTASSIUM SERPL-SCNC: 3.6 MMOL/L
PROT SERPL-MCNC: 7.9 G/DL
RBC # BLD AUTO: 3.81 M/UL
SODIUM SERPL-SCNC: 137 MMOL/L
WBC # BLD AUTO: 6.8 K/UL

## 2018-09-10 PROCEDURE — 80053 COMPREHEN METABOLIC PANEL: CPT

## 2018-09-10 PROCEDURE — 86140 C-REACTIVE PROTEIN: CPT

## 2018-09-10 PROCEDURE — 85025 COMPLETE CBC W/AUTO DIFF WBC: CPT

## 2018-09-10 PROCEDURE — 85652 RBC SED RATE AUTOMATED: CPT

## 2018-09-11 LAB — FUNGUS SPEC CULT: NORMAL

## 2018-09-12 ENCOUNTER — HOSPITAL ENCOUNTER (OUTPATIENT)
Dept: WOUND CARE | Facility: HOSPITAL | Age: 68
Discharge: HOME | End: 2018-09-12
Attending: SURGERY
Payer: MEDICARE

## 2018-09-12 VITALS
TEMPERATURE: 98 F | WEIGHT: 144 LBS | DIASTOLIC BLOOD PRESSURE: 80 MMHG | BODY MASS INDEX: 29.03 KG/M2 | SYSTOLIC BLOOD PRESSURE: 148 MMHG | HEART RATE: 83 BPM | HEIGHT: 59 IN

## 2018-09-12 DIAGNOSIS — E11.628 DIABETES WITH SKIN COMPLICATION: ICD-10-CM

## 2018-09-12 DIAGNOSIS — L02.211 ABDOMINAL WALL ABSCESS: Primary | ICD-10-CM

## 2018-09-12 PROCEDURE — 99214 OFFICE O/P EST MOD 30 MIN: CPT

## 2018-09-12 RX ORDER — GENTAMICIN SULFATE 1 MG/G
CREAM TOPICAL DAILY
Qty: 30 G | Refills: 1 | Status: SHIPPED | OUTPATIENT
Start: 2018-09-12 | End: 2018-10-24 | Stop reason: SDUPTHER

## 2018-09-12 NOTE — PROGRESS NOTES
Much of the documentation for this visit was completed in the [facility] system. Please see the attached documentation for further details about this patient's care.     Tammy Jurado LPN

## 2018-09-17 ENCOUNTER — LAB VISIT (OUTPATIENT)
Dept: LAB | Facility: HOSPITAL | Age: 68
End: 2018-09-17
Attending: SURGERY
Payer: MEDICARE

## 2018-09-17 DIAGNOSIS — T81.43XA POSTOPERATIVE INTRA-ABDOMINAL ABSCESS: Primary | ICD-10-CM

## 2018-09-17 LAB
ALBUMIN SERPL BCP-MCNC: 3 G/DL
ALP SERPL-CCNC: 79 U/L
ALT SERPL W/O P-5'-P-CCNC: 21 U/L
ANION GAP SERPL CALC-SCNC: 11 MMOL/L
AST SERPL-CCNC: 24 U/L
BASOPHILS # BLD AUTO: 0.02 K/UL
BASOPHILS NFR BLD: 0.3 %
BILIRUB SERPL-MCNC: 0.3 MG/DL
BUN SERPL-MCNC: 17 MG/DL
CALCIUM SERPL-MCNC: 9.3 MG/DL
CHLORIDE SERPL-SCNC: 104 MMOL/L
CO2 SERPL-SCNC: 25 MMOL/L
CREAT SERPL-MCNC: 0.9 MG/DL
CRP SERPL-MCNC: 32.4 MG/L
DIFFERENTIAL METHOD: ABNORMAL
EOSINOPHIL # BLD AUTO: 0.2 K/UL
EOSINOPHIL NFR BLD: 2.4 %
ERYTHROCYTE [DISTWIDTH] IN BLOOD BY AUTOMATED COUNT: 16.6 %
ERYTHROCYTE [SEDIMENTATION RATE] IN BLOOD BY WESTERGREN METHOD: 69 MM/HR
EST. GFR  (AFRICAN AMERICAN): >60 ML/MIN/1.73 M^2
EST. GFR  (NON AFRICAN AMERICAN): >60 ML/MIN/1.73 M^2
GLUCOSE SERPL-MCNC: 132 MG/DL
HCT VFR BLD AUTO: 31.3 %
HGB BLD-MCNC: 9.8 G/DL
IMM GRANULOCYTES # BLD AUTO: 0.02 K/UL
IMM GRANULOCYTES NFR BLD AUTO: 0.3 %
LYMPHOCYTES # BLD AUTO: 1.6 K/UL
LYMPHOCYTES NFR BLD: 22.4 %
MCH RBC QN AUTO: 25.5 PG
MCHC RBC AUTO-ENTMCNC: 31.3 G/DL
MCV RBC AUTO: 81 FL
MONOCYTES # BLD AUTO: 0.4 K/UL
MONOCYTES NFR BLD: 5.4 %
NEUTROPHILS # BLD AUTO: 4.9 K/UL
NEUTROPHILS NFR BLD: 69.2 %
NRBC BLD-RTO: 0 /100 WBC
PLATELET # BLD AUTO: 409 K/UL
PMV BLD AUTO: 9.2 FL
POTASSIUM SERPL-SCNC: 3.3 MMOL/L
PROT SERPL-MCNC: 7.7 G/DL
RBC # BLD AUTO: 3.85 M/UL
SODIUM SERPL-SCNC: 140 MMOL/L
WBC # BLD AUTO: 7.09 K/UL

## 2018-09-17 PROCEDURE — 86140 C-REACTIVE PROTEIN: CPT

## 2018-09-17 PROCEDURE — 85025 COMPLETE CBC W/AUTO DIFF WBC: CPT

## 2018-09-17 PROCEDURE — 85652 RBC SED RATE AUTOMATED: CPT

## 2018-09-17 PROCEDURE — 80053 COMPREHEN METABOLIC PANEL: CPT

## 2018-09-19 ENCOUNTER — HOSPITAL ENCOUNTER (OUTPATIENT)
Dept: WOUND CARE | Facility: HOSPITAL | Age: 68
Discharge: HOME | End: 2018-09-19
Attending: SURGERY
Payer: MEDICARE

## 2018-09-19 VITALS
DIASTOLIC BLOOD PRESSURE: 93 MMHG | SYSTOLIC BLOOD PRESSURE: 177 MMHG | WEIGHT: 144 LBS | HEART RATE: 83 BPM | BODY MASS INDEX: 20.16 KG/M2 | TEMPERATURE: 98 F | HEIGHT: 71 IN

## 2018-09-19 PROCEDURE — 99213 OFFICE O/P EST LOW 20 MIN: CPT

## 2018-09-24 ENCOUNTER — LAB VISIT (OUTPATIENT)
Dept: LAB | Facility: HOSPITAL | Age: 68
End: 2018-09-24
Attending: SURGERY
Payer: MEDICARE

## 2018-09-24 ENCOUNTER — HOSPITAL ENCOUNTER (EMERGENCY)
Facility: HOSPITAL | Age: 68
Discharge: HOME | End: 2018-09-24
Attending: EMERGENCY MEDICINE
Payer: MEDICARE

## 2018-09-24 VITALS
HEART RATE: 92 BPM | OXYGEN SATURATION: 98 % | WEIGHT: 160 LBS | DIASTOLIC BLOOD PRESSURE: 95 MMHG | HEIGHT: 62 IN | RESPIRATION RATE: 18 BRPM | BODY MASS INDEX: 29.44 KG/M2 | TEMPERATURE: 98 F | SYSTOLIC BLOOD PRESSURE: 173 MMHG

## 2018-09-24 DIAGNOSIS — R11.0 NAUSEA: ICD-10-CM

## 2018-09-24 DIAGNOSIS — T81.43XA POSTOPERATIVE INTRA-ABDOMINAL ABSCESS: Primary | ICD-10-CM

## 2018-09-24 DIAGNOSIS — Z45.2 ENCOUNTER FOR ASSESSMENT OF PERIPHERALLY INSERTED CENTRAL VENOUS CATHETER (PICC): Primary | ICD-10-CM

## 2018-09-24 LAB
ALBUMIN SERPL BCP-MCNC: 3 G/DL
ALP SERPL-CCNC: 93 U/L
ALT SERPL W/O P-5'-P-CCNC: 22 U/L
ANION GAP SERPL CALC-SCNC: 12 MMOL/L
AST SERPL-CCNC: 25 U/L
BASOPHILS # BLD AUTO: 0.02 K/UL
BASOPHILS NFR BLD: 0.2 %
BILIRUB SERPL-MCNC: 0.2 MG/DL
BUN SERPL-MCNC: 16 MG/DL
CALCIUM SERPL-MCNC: 9.7 MG/DL
CHLORIDE SERPL-SCNC: 103 MMOL/L
CO2 SERPL-SCNC: 24 MMOL/L
CREAT SERPL-MCNC: 1 MG/DL
CRP SERPL-MCNC: 61 MG/L
DIFFERENTIAL METHOD: ABNORMAL
EOSINOPHIL # BLD AUTO: 0.2 K/UL
EOSINOPHIL NFR BLD: 1.9 %
ERYTHROCYTE [DISTWIDTH] IN BLOOD BY AUTOMATED COUNT: 16 %
ERYTHROCYTE [SEDIMENTATION RATE] IN BLOOD BY WESTERGREN METHOD: 98 MM/HR
EST. GFR  (AFRICAN AMERICAN): >60 ML/MIN/1.73 M^2
EST. GFR  (NON AFRICAN AMERICAN): 58.4 ML/MIN/1.73 M^2
GLUCOSE SERPL-MCNC: 223 MG/DL
HCT VFR BLD AUTO: 32.6 %
HGB BLD-MCNC: 10.2 G/DL
IMM GRANULOCYTES # BLD AUTO: 0.02 K/UL
IMM GRANULOCYTES NFR BLD AUTO: 0.2 %
LYMPHOCYTES # BLD AUTO: 1.5 K/UL
LYMPHOCYTES NFR BLD: 17.8 %
MCH RBC QN AUTO: 25.2 PG
MCHC RBC AUTO-ENTMCNC: 31.3 G/DL
MCV RBC AUTO: 81 FL
MONOCYTES # BLD AUTO: 0.4 K/UL
MONOCYTES NFR BLD: 4.9 %
NEUTROPHILS # BLD AUTO: 6.3 K/UL
NEUTROPHILS NFR BLD: 75 %
NRBC BLD-RTO: 0 /100 WBC
PLATELET # BLD AUTO: 385 K/UL
PMV BLD AUTO: 9.3 FL
POTASSIUM SERPL-SCNC: 3.4 MMOL/L
PROT SERPL-MCNC: 8 G/DL
RBC # BLD AUTO: 4.04 M/UL
SODIUM SERPL-SCNC: 139 MMOL/L
WBC # BLD AUTO: 8.39 K/UL

## 2018-09-24 PROCEDURE — 86140 C-REACTIVE PROTEIN: CPT

## 2018-09-24 PROCEDURE — 85652 RBC SED RATE AUTOMATED: CPT

## 2018-09-24 PROCEDURE — 99283 EMERGENCY DEPT VISIT LOW MDM: CPT

## 2018-09-24 PROCEDURE — 80053 COMPREHEN METABOLIC PANEL: CPT

## 2018-09-24 PROCEDURE — 85025 COMPLETE CBC W/AUTO DIFF WBC: CPT

## 2018-09-24 PROCEDURE — 25000003 PHARM REV CODE 250: Performed by: EMERGENCY MEDICINE

## 2018-09-24 RX ORDER — ACETAMINOPHEN 325 MG/1
650 TABLET ORAL
Status: COMPLETED | OUTPATIENT
Start: 2018-09-24 | End: 2018-09-24

## 2018-09-24 RX ORDER — ONDANSETRON 4 MG/1
4 TABLET, ORALLY DISINTEGRATING ORAL
Status: COMPLETED | OUTPATIENT
Start: 2018-09-24 | End: 2018-09-24

## 2018-09-24 RX ORDER — ONDANSETRON 4 MG/1
4 TABLET, FILM COATED ORAL EVERY 6 HOURS PRN
Qty: 10 TABLET | Refills: 0 | Status: SHIPPED | OUTPATIENT
Start: 2018-09-24 | End: 2019-01-09

## 2018-09-24 RX ADMIN — ACETAMINOPHEN 650 MG: 325 TABLET ORAL at 08:09

## 2018-09-24 RX ADMIN — ONDANSETRON 4 MG: 4 TABLET, ORALLY DISINTEGRATING ORAL at 08:09

## 2018-09-25 NOTE — ED PROVIDER NOTES
Encounter Date: 2018       History     Chief Complaint   Patient presents with    Vascular Access Problem     reports has been having X 1 month. had antibiotics administered today, and blood back into line. States this is the first time this has happened. Clamped medication and came to ED     68-year-old female presents emergency department complaining of blood return through her PICC line.  Patient has had an indwelling PICC line to her left upper extremity for a month.  She is getting IV antibiotics at home daily.  She reports that she was getting the antibiotics as usual this evening when she noticed blood was going up the line despite antibiotics not being finished and fact that they were hanging.  This concerned her and prompted her to come to the emergency department.  Denies any pain to the area.  She reports she frequently gets nausea during these infusions, which is normal and not a new symptom.  Denies any other symptoms at this time.    Caretakers at bedside and translating for the patient, confirms the history.          Review of patient's allergies indicates:   Allergen Reactions    Chlorhexidine gluconate Rash     Chlorhexidine/alcohol wipes. Rash and blistering.     Past Medical History:   Diagnosis Date    Diabetes mellitus     Diabetes mellitus, type 2     Hypertension     Kidney stone      Past Surgical History:   Procedure Laterality Date     SECTION, CLASSIC      CHOLECYSTECTOMY      CLOSURE-COLOSTOMY N/A 2016    [facility]    [facility]    COLONOSCOPY N/A 2018    Procedure: COLONOSCOPY;  Surgeon: Gibran Aguayo MD;  Location: [facility] ENDO;  Service: Endoscopy;  Laterality: N/A;    COLONOSCOPY N/A 2018    Performed by [facility]an [facility]o MD at [facility] ENDO    COLOSTOMY Left     DEBRIDEMENT-WOUND-ABDOMINAL WALL N/A 2017    [facility] by [facility] at [facility] OR     EXPLORATORY-LAPAROTOMY N/A 11/14/2016    [facility] at [facility] OR    INCISION OF ABDOMINAL WALL N/A 8/10/2018    Procedure: INCISION, ABDOMINAL WALL;  Surgeon: Ulisses Horton MD;  Location: [facility] OR;  Service: General;  Laterality: N/A;    INCISION, ABDOMINAL WALL N/A 8/10/2018    Per[facility] by [facility] at [facility] OR    INCISIONAL HERNIA REPAIR Right 2010    LYSIS-ADHESION N/A 11/14/2016    Performed by [facility]orton MD at [facility] OR    RESECTION - SMALL BOWEL and fistula resection  N/A 11/14/2016    Performed by [facility]orton MD at [facility] OR    WOUND EXPLORATION N/A 8/2/2016    Performed by [facility]es [facility]orton [facility] at [facility] OR     No family history on file.  Social History     Tobacco Use    Smoking status: Never Smoker    Smokeless tobacco: Never Used   Substance Use Topics    Alcohol use: No    Drug use: No     Review of Systems   Constitutional: Negative for chills, fatigue and fever.   HENT: Negative for congestion, sore throat and voice change.    Eyes: Negative for photophobia, pain and redness.   Respiratory: Negative for cough, choking and shortness of breath.    Cardiovascular: Negative for chest pain, palpitations and leg swelling.   Gastrointestinal: Positive for nausea. Negative for abdominal pain and vomiting.   Genitourinary: Negative for dysuria, frequency and urgency.   Musculoskeletal: Negative for back pain, neck pain and neck stiffness.   Neurological: Negative for seizures, speech difficulty, light-headedness, numbness and headaches.   All other systems reviewed and are negative.      Physical Exam     Initial Vitals [09/24/18 1943]   BP Pulse Resp Temp SpO2   (!) 173/95 92 18 98.2 °F (36.8 °C) 98 %      MAP       --         Physical Exam    Nursing note and vitals reviewed.  Constitutional: She appears well-developed and well-nourished. No distress.   HENT:   Head: Normocephalic and atraumatic.   Mouth/Throat: Oropharynx is clear and  moist.   Eyes: Conjunctivae and EOM are normal. Pupils are equal, round, and reactive to light.   Neck: Normal range of motion. Neck supple. No tracheal deviation present.   Cardiovascular: Normal rate, regular rhythm, normal heart sounds and intact distal pulses.   Pulmonary/Chest: Breath sounds normal. No respiratory distress. She has no wheezes. She has no rhonchi. She has no rales.   Abdominal: Soft. Bowel sounds are normal. She exhibits no distension. There is no tenderness.   Musculoskeletal: Normal range of motion. She exhibits no edema or tenderness.   PICC line to left upper extremity without surrounding erythema, induration, fluctuance, or drainage   Neurological: She is alert and oriented to person, place, and time. She has normal strength. No cranial nerve deficit or sensory deficit.   Skin: Skin is warm and dry. Capillary refill takes less than 2 seconds.         ED Course   Procedures  Labs Reviewed - No data to display       Imaging Results    None          Medical Decision Making:   Initial Assessment:   68-year-old female presents emergency department concerned about blood coming up her PICC line  Differential Diagnosis:   Infiltration, infection, dislodged, malfunction  ED Management:  PICC line flushed, drawn, flushed again without dysfunction.  Patient given some nausea medicine as well as some Tylenol because she reported a mild headache just prior to departure.  Informed her of plan to discharge with instructions to follow up with her primary care physician, home management techniques for the PICC line, and to continue her infusions, return with new or worsening symptoms.                      Clinical Impression:   The primary encounter diagnosis was Encounter for assessment of peripherally inserted central venous catheter (PICC). A diagnosis of Nausea was also pertinent to this visit.      Disposition:   Disposition: Discharged  Condition: Stable                        Felix Cordova  MD  09/24/18 2043

## 2018-09-25 NOTE — ED NOTES
Dr. Cordova at bedside. PICC line flushed without difficulty, +blood return. PICC line saline locked.

## 2018-09-26 ENCOUNTER — HOSPITAL ENCOUNTER (OUTPATIENT)
Dept: WOUND CARE | Facility: HOSPITAL | Age: 68
Discharge: HOME | End: 2018-09-26
Attending: SURGERY
Payer: MEDICARE

## 2018-09-26 VITALS
TEMPERATURE: 98 F | DIASTOLIC BLOOD PRESSURE: 92 MMHG | SYSTOLIC BLOOD PRESSURE: 166 MMHG | HEART RATE: 80 BPM | HEIGHT: 62 IN | BODY MASS INDEX: 29.44 KG/M2 | WEIGHT: 160 LBS

## 2018-09-26 PROCEDURE — 99213 OFFICE O/P EST LOW 20 MIN: CPT

## 2018-10-03 ENCOUNTER — HOSPITAL ENCOUNTER (OUTPATIENT)
Dept: WOUND CARE | Facility: HOSPITAL | Age: 68
Discharge: HOME | End: 2018-10-03
Attending: SURGERY
Payer: MEDICARE

## 2018-10-03 VITALS — TEMPERATURE: 98 F | SYSTOLIC BLOOD PRESSURE: 155 MMHG | HEART RATE: 84 BPM | DIASTOLIC BLOOD PRESSURE: 84 MMHG

## 2018-10-03 DIAGNOSIS — T14.8XXA DRAINAGE FROM WOUND: ICD-10-CM

## 2018-10-03 DIAGNOSIS — R10.32 LLQ PAIN: ICD-10-CM

## 2018-10-03 DIAGNOSIS — L98.499 TYPE 2 DIABETES MELLITUS WITH OTHER SKIN ULCER, UNSPECIFIED WHETHER LONG TERM INSULIN USE: ICD-10-CM

## 2018-10-03 DIAGNOSIS — E11.628 DIABETES WITH SKIN COMPLICATION: ICD-10-CM

## 2018-10-03 DIAGNOSIS — K63.5 POLYP OF COLON, UNSPECIFIED PART OF COLON, UNSPECIFIED TYPE: ICD-10-CM

## 2018-10-03 DIAGNOSIS — Z16.12 INFECTION DUE TO ESBL-PRODUCING ESCHERICHIA COLI: ICD-10-CM

## 2018-10-03 DIAGNOSIS — K63.2 FISTULA OF INTESTINE, EXCLUDING RECTUM AND ANUS: ICD-10-CM

## 2018-10-03 DIAGNOSIS — I10 ESSENTIAL HYPERTENSION: Primary | ICD-10-CM

## 2018-10-03 DIAGNOSIS — A49.8 INFECTION DUE TO ESBL-PRODUCING ESCHERICHIA COLI: ICD-10-CM

## 2018-10-03 DIAGNOSIS — E11.622 TYPE 2 DIABETES MELLITUS WITH OTHER SKIN ULCER, UNSPECIFIED WHETHER LONG TERM INSULIN USE: ICD-10-CM

## 2018-10-03 DIAGNOSIS — L02.211 ABDOMINAL WALL ABSCESS: ICD-10-CM

## 2018-10-03 DIAGNOSIS — Z98.890 S/P EXPLORATORY LAPAROTOMY: ICD-10-CM

## 2018-10-03 PROCEDURE — 99214 OFFICE O/P EST MOD 30 MIN: CPT

## 2018-10-04 ENCOUNTER — LAB VISIT (OUTPATIENT)
Dept: LAB | Facility: HOSPITAL | Age: 68
End: 2018-10-04
Attending: SURGERY
Payer: MEDICARE

## 2018-10-04 DIAGNOSIS — K63.2 FISTULA OF INTESTINE, EXCLUDING RECTUM AND ANUS: ICD-10-CM

## 2018-10-04 DIAGNOSIS — L02.211 ABDOMINAL WALL ABSCESS: ICD-10-CM

## 2018-10-04 DIAGNOSIS — T14.8XXA DRAINAGE FROM WOUND: ICD-10-CM

## 2018-10-04 DIAGNOSIS — L98.499 TYPE 2 DIABETES MELLITUS WITH OTHER SKIN ULCER, UNSPECIFIED WHETHER LONG TERM INSULIN USE: ICD-10-CM

## 2018-10-04 DIAGNOSIS — Z98.890 S/P EXPLORATORY LAPAROTOMY: ICD-10-CM

## 2018-10-04 DIAGNOSIS — A49.8 INFECTION DUE TO ESBL-PRODUCING ESCHERICHIA COLI: ICD-10-CM

## 2018-10-04 DIAGNOSIS — E11.622 TYPE 2 DIABETES MELLITUS WITH OTHER SKIN ULCER, UNSPECIFIED WHETHER LONG TERM INSULIN USE: ICD-10-CM

## 2018-10-04 DIAGNOSIS — I10 ESSENTIAL HYPERTENSION: ICD-10-CM

## 2018-10-04 DIAGNOSIS — E11.628 DIABETES WITH SKIN COMPLICATION: ICD-10-CM

## 2018-10-04 DIAGNOSIS — Z16.12 INFECTION DUE TO ESBL-PRODUCING ESCHERICHIA COLI: ICD-10-CM

## 2018-10-04 DIAGNOSIS — K63.5 POLYP OF COLON, UNSPECIFIED PART OF COLON, UNSPECIFIED TYPE: ICD-10-CM

## 2018-10-04 DIAGNOSIS — R10.32 LLQ PAIN: ICD-10-CM

## 2018-10-04 LAB
BASOPHILS # BLD AUTO: 0.02 K/UL
BASOPHILS NFR BLD: 0.3 %
CRP SERPL-MCNC: 50.7 MG/L
DIFFERENTIAL METHOD: ABNORMAL
EOSINOPHIL # BLD AUTO: 0.2 K/UL
EOSINOPHIL NFR BLD: 2.3 %
ERYTHROCYTE [DISTWIDTH] IN BLOOD BY AUTOMATED COUNT: 15 %
ERYTHROCYTE [SEDIMENTATION RATE] IN BLOOD BY WESTERGREN METHOD: 100 MM/HR
HCT VFR BLD AUTO: 31.8 %
HGB BLD-MCNC: 10.1 G/DL
LYMPHOCYTES # BLD AUTO: 1.8 K/UL
LYMPHOCYTES NFR BLD: 24.5 %
MCH RBC QN AUTO: 24.8 PG
MCHC RBC AUTO-ENTMCNC: 31.8 G/DL
MCV RBC AUTO: 78 FL
MONOCYTES # BLD AUTO: 0.5 K/UL
MONOCYTES NFR BLD: 6.3 %
NEUTROPHILS # BLD AUTO: 4.8 K/UL
NEUTROPHILS NFR BLD: 66.2 %
PLATELET # BLD AUTO: 392 K/UL
PMV BLD AUTO: 8.2 FL
RBC # BLD AUTO: 4.07 M/UL
WBC # BLD AUTO: 7.31 K/UL

## 2018-10-04 PROCEDURE — 85652 RBC SED RATE AUTOMATED: CPT

## 2018-10-04 PROCEDURE — 36415 COLL VENOUS BLD VENIPUNCTURE: CPT

## 2018-10-04 PROCEDURE — 85025 COMPLETE CBC W/AUTO DIFF WBC: CPT

## 2018-10-04 PROCEDURE — 86140 C-REACTIVE PROTEIN: CPT

## 2018-10-10 ENCOUNTER — HOSPITAL ENCOUNTER (OUTPATIENT)
Dept: WOUND CARE | Facility: HOSPITAL | Age: 68
Discharge: HOME | End: 2018-10-10
Attending: SURGERY
Payer: MEDICARE

## 2018-10-10 VITALS
BODY MASS INDEX: 29.44 KG/M2 | DIASTOLIC BLOOD PRESSURE: 85 MMHG | HEART RATE: 80 BPM | WEIGHT: 160 LBS | SYSTOLIC BLOOD PRESSURE: 166 MMHG | HEIGHT: 62 IN | TEMPERATURE: 99 F

## 2018-10-10 DIAGNOSIS — Z16.12 INFECTION DUE TO ESBL-PRODUCING ESCHERICHIA COLI: ICD-10-CM

## 2018-10-10 DIAGNOSIS — A49.8 INFECTION DUE TO ESBL-PRODUCING ESCHERICHIA COLI: ICD-10-CM

## 2018-10-10 DIAGNOSIS — R10.32 LLQ PAIN: ICD-10-CM

## 2018-10-10 DIAGNOSIS — E11.622 TYPE 2 DIABETES MELLITUS WITH OTHER SKIN ULCER, WITH LONG-TERM CURRENT USE OF INSULIN: ICD-10-CM

## 2018-10-10 DIAGNOSIS — T81.89XA SUTURE GRANULOMA, INITIAL ENCOUNTER: ICD-10-CM

## 2018-10-10 DIAGNOSIS — E11.628 DIABETES WITH SKIN COMPLICATION: ICD-10-CM

## 2018-10-10 DIAGNOSIS — R10.84 GENERALIZED ABDOMINAL PAIN: ICD-10-CM

## 2018-10-10 DIAGNOSIS — I10 ESSENTIAL HYPERTENSION: ICD-10-CM

## 2018-10-10 DIAGNOSIS — T14.8XXA DRAINAGE FROM WOUND: Primary | ICD-10-CM

## 2018-10-10 DIAGNOSIS — Z79.4 TYPE 2 DIABETES MELLITUS WITH OTHER SKIN ULCER, WITH LONG-TERM CURRENT USE OF INSULIN: ICD-10-CM

## 2018-10-10 DIAGNOSIS — K63.5 POLYP OF COLON, UNSPECIFIED PART OF COLON, UNSPECIFIED TYPE: ICD-10-CM

## 2018-10-10 DIAGNOSIS — L02.211 ABDOMINAL WALL ABSCESS: ICD-10-CM

## 2018-10-10 DIAGNOSIS — K63.2 COLONIC FISTULA: ICD-10-CM

## 2018-10-10 PROCEDURE — 99215 OFFICE O/P EST HI 40 MIN: CPT

## 2018-10-11 ENCOUNTER — HOSPITAL ENCOUNTER (OUTPATIENT)
Facility: HOSPITAL | Age: 68
Discharge: HOME | End: 2018-10-11
Attending: SURGERY | Admitting: SURGERY
Payer: MEDICARE

## 2018-10-11 VITALS
SYSTOLIC BLOOD PRESSURE: 156 MMHG | TEMPERATURE: 99 F | RESPIRATION RATE: 15 BRPM | BODY MASS INDEX: 29.44 KG/M2 | HEART RATE: 74 BPM | OXYGEN SATURATION: 99 % | HEIGHT: 62 IN | WEIGHT: 160 LBS | DIASTOLIC BLOOD PRESSURE: 90 MMHG

## 2018-10-11 DIAGNOSIS — L02.211 ABDOMINAL WALL ABSCESS: ICD-10-CM

## 2018-10-11 DIAGNOSIS — T81.89XA SUTURE GRANULOMA, INITIAL ENCOUNTER: ICD-10-CM

## 2018-10-11 DIAGNOSIS — K63.2 COLONIC FISTULA: ICD-10-CM

## 2018-10-11 DIAGNOSIS — Z79.4 TYPE 2 DIABETES MELLITUS WITH OTHER SKIN ULCER, WITH LONG-TERM CURRENT USE OF INSULIN: ICD-10-CM

## 2018-10-11 DIAGNOSIS — A49.8 INFECTION DUE TO ESBL-PRODUCING ESCHERICHIA COLI: ICD-10-CM

## 2018-10-11 DIAGNOSIS — K63.5 POLYP OF COLON, UNSPECIFIED PART OF COLON, UNSPECIFIED TYPE: ICD-10-CM

## 2018-10-11 DIAGNOSIS — R10.32 LLQ PAIN: ICD-10-CM

## 2018-10-11 DIAGNOSIS — Z16.12 INFECTION DUE TO ESBL-PRODUCING ESCHERICHIA COLI: ICD-10-CM

## 2018-10-11 DIAGNOSIS — E11.622 TYPE 2 DIABETES MELLITUS WITH OTHER SKIN ULCER, WITH LONG-TERM CURRENT USE OF INSULIN: ICD-10-CM

## 2018-10-11 DIAGNOSIS — R10.84 GENERALIZED ABDOMINAL PAIN: ICD-10-CM

## 2018-10-11 DIAGNOSIS — E11.628 DIABETES WITH SKIN COMPLICATION: ICD-10-CM

## 2018-10-11 PROCEDURE — 77001 FLUOROGUIDE FOR VEIN DEVICE: CPT

## 2018-10-11 NOTE — OR NURSING
D/c instructions given to the pt. Pt. Verb. Understanding. Pt. Ambulated to private vehicle.  
Normal rate, regular rhythm.  Heart sounds S1, S2.  No murmurs, rubs or gallops.

## 2018-10-11 NOTE — DISCHARGE INSTRUCTIONS
Discharge Instructions: Caring for Your Central Line  You are going home with a central line. Its also called a central venous access device (CVAD) or central venous catheter (CVC). A small, soft tube (catheter) has been put in a vein that leads to your heart. This provides medicine during your treatment. It is taken out when you no longer need it. At home, you need to take care of your central line to keep it working. A central line has a high infection risk. So you must take extra care washing your hands and preventing the spread of germs. This sheet will help you remember what to do at home.  Understanding your role  · A nurse or other healthcare provider will teach you and your caregivers how to care for the central line. Before leaving the hospital, make sure you understand what to do at home, how long you may need the central line, and when to have a follow-up visit.  · You will likely be told to flush the central line with saline or heparin solution. You may also be told to change the catheters injection caps and change the bandage (dressing). Or, a nurse may do this for you during a follow-up visit. Only do these things if youre told to do so. Follow the instructions you were given.  Protecting the central line  If the central line gets damaged, it wont work right and could raise your chance of infection. Call your healthcare team right away if any damage occurs. To protect the central line at home:  · Prevent infection. Use good hand hygiene by following the guidelines on this sheet. Dont touch the catheter or dressing unless you need to. And always clean your hands before and after you come in contact with any part of the central line. Your caregivers, family members, and any visitors should use good hand hygiene, too.  · Keep the central line dry. The catheter and dressing must stay dry. Dont take baths, go swimming, use a hot tub, or do other activities that could get the central line wet. Take a  sponge bath to avoid getting the central line wet, unless your healthcare provider tells you otherwise. Ask your provider about the best way to keep the line dry when bathing or showering. If the dressing does get wet, change it only if you have been shown how. Otherwise, call your healthcare team right away for help.  · Avoid damage. Dont use any sharp or pointy objects around the catheter. This includes scissors, pins, knives, razors, or anything else that could cut it or put a hole in it (puncture it). Also, dont let anything pull or rub on the catheter, such as clothing.  · Watch for signs of problems. Pay attention to how much of the catheter sticks out from your skin. If this changes at all, let your healthcare provider know. Also watch for cracks, leaks, or other damage. If the dressing becomes dirty, loose, or wet, change it (if you have been instructed to). Or call your healthcare team right away.  · Avoid lowering your chest below your waist. This includes bending at the waist to do things like tying your shoes. When your chest is below your waist, especially for a long time, the catheters internal tip could slip out of place in the vein.  · Tell your healthcare team if you vomit or have severe coughing. This can also make the catheter slip out of place.  Risk of blood clot  If a blood clot forms it can block blood flow through the vein where the catheter is placed. Signs of a blood clot include pain or swelling in your neck, face, chest or arm. If you have any of these symptoms, call your healthcare provider right away. You may need an ultrasound exam to find the blood clot. You may also be treated with a blood thinner.    Prevent infection with good hand hygiene  A central line can let germs into your body. This can lead to serious and sometimes deadly infections. To prevent infection, its very important that you, your caregivers, and others around you use good hand hygiene. This means washing your  hands well with soap and water, and cleaning them with alcohol-based hand gel as directed. Never touch the central line or dressing without first using one of these methods.  To wash your hands with soap and water:  1. Wet your hands with warm water. (Avoid hot water. It can cause skin irritation when you wash your hands often.)  2. Apply enough soap to cover the entire surface of your hands, including your fingers.  3. Rub your hands together briskly for at least 15 seconds. Make sure to rub the front and back of each hand up to the wrist, your fingers and fingernails, between the fingers, and each thumb.  4. Rinse your hands with warm water.  5. Dry your hands completely with a new, unused paper towel. Dont use a cloth towel or other reusable towel. These can harbor germs.  6. Use the paper towel to turn off the faucet, then throw it away. If youre in a bathroom, also use a paper towel to open the door instead of touching the handle.  When you dont have access to soap and water: Use alcohol-based hand gel to clean your hands. The gel should have at least 60% alcohol. Follow the instructions on the package. Your healthcare team can answer any questions you have about when to use hand gel, or when its better to wash with soap and water.   When to seek medical care  Call your healthcare provider right away if you have any of the following:  · Pain or burning in your shoulder, chest, back, arm, or leg  · Fever of 100.4°F (38.0°C) or higher  · Chills  · Signs of infection at the catheter site (pain, redness, drainage, burning, or stinging)  · Coughing, wheezing, or shortness of breath  · A racing or irregular heartbeat  · Muscle stiffness or trouble moving  · Gurgling noises coming from the catheter  · The catheter falls out, breaks, cracks, leaks, or has other damage   Date Last Reviewed: 7/1/2016  © 5234-8584 [employer] [address] [place] [address], [place] [address]. All rights reserved. This  information is not intended as a substitute for professional medical care. Always follow your healthcare professional's instructions.        Peripherally Inserted Central Catheter (PICC)     A PICC may have more than one channel (lumen). This means that different fluids or medicines can be given at once. Keep in mind that your PICC should be accessed only by trained medical professionals.   You need a peripherally inserted central catheter (PICC) for your treatment. A catheter is a small, soft tube. The catheter is inserted into a vein in your arm. It is then moved through your vein until the tip sits in the large vein (vena cava) near your heart. A PICC is often used when treatment requires you to have medicine or nutrition for weeks or months. When you no longer need the PICC, your healthcare provider will remove it. Your skin will then heal. This article tells you more about a PICC and how a healthcare provider places it in your body.  Why do I need a PICC?  A PICC takes the place of a standard IV (intravenous) line. A standard IV needs to be changed every few days. Since the PICC can stay in place longer, you may have fewer needle sticks during your treatment. There is less damage to the small veins where an IV would normally be inserted. Your healthcare provider can give you more details about why you need the PICC.  Getting a PICC  A short procedure is done to place the PICC in your body. This will be done in your hospital room, the radiology department, or somewhere else in the hospital. It may vary slightly from the steps described here. Your healthcare team can tell you what to expect. In general, during PICC placement:  · Youre fully covered with a large sterile sheet (drape). This lowers risk for infection. Only the spot where the PICC will be placed is exposed. The skin here is cleaned with an antiseptic solution.  · Ultrasound images may be viewed on a video screen. These are used to help find the best  vein to use.  · The area where the PICC will be inserted is numbed with a shot of local anesthetic. This decreases discomfort during the PICC placement.  · After the local anesthetic takes effect, the catheter is gently passed into the vein. Its moved along until the tip is in the vena cava, close to the heart.  · The other end of the catheter sticks out a few inches from your skin. It may be loosely attached to the skin with stitches (sutures) or a securement device.  · The provider will flush the catheter with saline solution, to clear it. The solution may include heparin, which prevents blood clots.  · An X-ray or other imaging test is done. This confirms the catheters position and checks for problems.  Risks and complications  As with any procedure, getting a PICC has certain risks. These include:  · Infection  · Bleeding problems  · An irregular heartbeat  · Injury to the vein or to lymph ducts near the vein  · Inflammation of the vein (phlebitis)  · Clots or air bubbles in the bloodstream  · Blockage of the blood vessel where the catheter is inserted  · Clots in the vein that may travel to the lung (pulmonary embolism)  · Nerve injury  · Accidental insertion into an artery instead of a vein  · Catheter not positioned correctly  Date Last Reviewed: 7/1/2016  © 2741-9256 [employer] [address] [place] [address], [place] [address]. All rights reserved. This information is not intended as a substitute for professional medical care. Always follow your healthcare professional's instructions.        PICC Line Care  PICC stands for peripherally inserted central catheter. This is a short-term (temporary) tube that is used instead of a regular IV (intravenous) line.   Reasons for using a PICC line  A PICC line may be used because:  · It reduces the discomfort of putting in a new IV every time one is needed.  · Medicine or nutrition needs to be given over a period of weeks or even months.  · A PICC can stay in  place longer than an IV, so it reduces needle sticks.  · It reduces damage to small veins, where an IV is normally inserted.  · A PICC may have more than 1 channel, so different fluids or medicines can be given at the same time.  · A PICC line allows for home therapy.  Your PICC will need some care to keep it clean and working. This care includes:  · Changing the bandage (dressing)  · Flushing the catheter with fluids  · Changing the cap on the end of the catheter  A nurse or other healthcare provider will teach you how to do each of these things.    Home care  The following are general care guidelines that will help you care for your PICC line at home:  · You can use your arm. But avoid any activity that causes mild pain.  · Do not pick at it or pull on the tubing.  · Dont lift anything heavier than 10 pounds with the arm on the side of the PICC line.  · When you bathe or shower, tape plastic wrap over the site to keep it dry.  · Do not put the PICC site under water. No swimming or hot tubs. If the dressing gets wet, change it right away.  · Always wash your hands before and after touching any part of your PICC.  · Do not allow the tubing to hang freely. Make sure to keep the tubing covered and secured to your arm to prevent the PICC line from being pulled out by accident.  The following tips will help you with dressing changes:  · Change the dressing over the site as directed. This is usually once a week. Change it sooner if the dressing gets wet or soiled. Check the dressing daily.  · You or a family member may be able to do the dressing change at home. Or you may be instructed to return to the office or clinic for dressing changes.  · Sterile technique must be used for PICC dressing change. If your dressing is changed at home, be sure you or your family member knows sterile dressing technique. Call your health care provider for instructions if you need them.  Follow-up care  Follow up as advised by your  healthcare provider or our staff.  When to seek medical advice  Call your healthcare provider right away if any of these occur:  · Fever of 100.4°F (38°C) or higher  · Drainage from the PICC site  · Swelling or bulging around the PICC site  · Bleeding from the PICC site  · Skin pulls away from the PICC site  · Redness, warmth, or pus at the PICC site  · Tubing breaks, splits, or leaks  · More exposed tubing (tubing seems longer) or the tubing is pulled out completely  · Medicine or fluids do not drain from the bag into your PICC  Date Last Reviewed: 7/1/2016  © 9870-3100 [employer]. [address] [place] [address], [place] [address]. All rights reserved. This information is not intended as a substitute for professional medical care. Always follow your healthcare professional's instructions.

## 2018-10-11 NOTE — PROCEDURES
"Azucena Morrison is a 68 y.o. female patient.    Temp: 98.5 °F (36.9 °C) (10/11/18 1226)  Pulse: 87 (10/11/18 1226)  Resp: 15 (10/11/18 1226)  BP: (!) 156/90 (10/11/18 1226)  SpO2: 99 % (10/11/18 1226)  Weight: 72.6 kg (160 lb) (10/11/18 1226)  Height: 5' 2" (157.5 cm) (10/11/18 1226)    PICC  Date/Time: 10/11/2018 1:20 PM  Time out: Immediately prior to procedure a time out was called to verify the correct patient, procedure, equipment, support staff and site/side marked as required  Indications: med administration and vascular access  Anesthesia: local infiltration  Local anesthetic: lidocaine 1% without epinephrine  Anesthetic Total (mL): 5  Preparation: skin prepped with Betadine  Skin prep agent dried: skin prep agent completely dried prior to procedure  Sterile barriers: all five maximum sterile barriers used - cap, mask, sterile gown, sterile gloves, and large sterile sheet  Hand hygiene: hand hygiene performed prior to central venous catheter insertion  Location details: left brachial  Catheter type: double lumen  Catheter size: 5 Fr  Catheter Length: 42cm    Ultrasound guidance: yes  Vessel Caliber: medium and patent, compressibility normal  Needle advanced into vessel with real time Ultrasound guidance.  Guidewire confirmed in vessel.  Sterile sheath used.  Manometry: esophageal manometry  Number of attempts: 1  Post-procedure: blood return through all ports, chlorhexidine patch and sterile dressing applied  Estimated blood loss (mL): 2            Lyle Calderon  10/11/2018  "

## 2018-10-22 ENCOUNTER — LAB VISIT (OUTPATIENT)
Dept: LAB | Facility: HOSPITAL | Age: 68
End: 2018-10-22
Attending: SURGERY
Payer: MEDICARE

## 2018-10-22 DIAGNOSIS — T81.40XA POSTOPERATIVE INFECTION: Primary | ICD-10-CM

## 2018-10-22 LAB
ALBUMIN SERPL BCP-MCNC: 3.1 G/DL
ALP SERPL-CCNC: 91 U/L
ALT SERPL W/O P-5'-P-CCNC: 21 U/L
ANION GAP SERPL CALC-SCNC: 10 MMOL/L
AST SERPL-CCNC: 24 U/L
BASOPHILS # BLD AUTO: 0.05 K/UL
BASOPHILS NFR BLD: 0.7 %
BILIRUB SERPL-MCNC: 0.2 MG/DL
BUN SERPL-MCNC: 19 MG/DL
CALCIUM SERPL-MCNC: 9.9 MG/DL
CHLORIDE SERPL-SCNC: 102 MMOL/L
CO2 SERPL-SCNC: 25 MMOL/L
CREAT SERPL-MCNC: 1 MG/DL
CRP SERPL-MCNC: 48.2 MG/L
DIFFERENTIAL METHOD: ABNORMAL
EOSINOPHIL # BLD AUTO: 0.3 K/UL
EOSINOPHIL NFR BLD: 4.2 %
ERYTHROCYTE [DISTWIDTH] IN BLOOD BY AUTOMATED COUNT: 15.2 %
ERYTHROCYTE [SEDIMENTATION RATE] IN BLOOD BY WESTERGREN METHOD: 88 MM/HR
EST. GFR  (AFRICAN AMERICAN): >60 ML/MIN/1.73 M^2
EST. GFR  (NON AFRICAN AMERICAN): 58.4 ML/MIN/1.73 M^2
GLUCOSE SERPL-MCNC: 173 MG/DL
HCT VFR BLD AUTO: 32.6 %
HGB BLD-MCNC: 10 G/DL
IMM GRANULOCYTES # BLD AUTO: 0.03 K/UL
IMM GRANULOCYTES NFR BLD AUTO: 0.4 %
LYMPHOCYTES # BLD AUTO: 1.8 K/UL
LYMPHOCYTES NFR BLD: 25.9 %
MCH RBC QN AUTO: 24.5 PG
MCHC RBC AUTO-ENTMCNC: 30.7 G/DL
MCV RBC AUTO: 80 FL
MONOCYTES # BLD AUTO: 0.4 K/UL
MONOCYTES NFR BLD: 6 %
NEUTROPHILS # BLD AUTO: 4.5 K/UL
NEUTROPHILS NFR BLD: 62.8 %
NRBC BLD-RTO: 0 /100 WBC
PLATELET # BLD AUTO: 424 K/UL
PMV BLD AUTO: 9.1 FL
POTASSIUM SERPL-SCNC: 3.4 MMOL/L
PROT SERPL-MCNC: 8.3 G/DL
RBC # BLD AUTO: 4.08 M/UL
SODIUM SERPL-SCNC: 137 MMOL/L
WBC # BLD AUTO: 7.11 K/UL

## 2018-10-22 PROCEDURE — 85025 COMPLETE CBC W/AUTO DIFF WBC: CPT

## 2018-10-22 PROCEDURE — 85652 RBC SED RATE AUTOMATED: CPT

## 2018-10-22 PROCEDURE — 80053 COMPREHEN METABOLIC PANEL: CPT

## 2018-10-22 PROCEDURE — 86140 C-REACTIVE PROTEIN: CPT

## 2018-10-24 ENCOUNTER — HOSPITAL ENCOUNTER (OUTPATIENT)
Dept: RADIOLOGY | Facility: HOSPITAL | Age: 68
Discharge: HOME | End: 2018-10-24
Attending: SURGERY
Payer: MEDICARE

## 2018-10-24 ENCOUNTER — HOSPITAL ENCOUNTER (OUTPATIENT)
Dept: WOUND CARE | Facility: HOSPITAL | Age: 68
Discharge: HOME | End: 2018-10-24
Attending: SURGERY
Payer: MEDICARE

## 2018-10-24 VITALS
BODY MASS INDEX: 29.44 KG/M2 | DIASTOLIC BLOOD PRESSURE: 81 MMHG | HEIGHT: 62 IN | SYSTOLIC BLOOD PRESSURE: 150 MMHG | TEMPERATURE: 98 F | HEART RATE: 77 BPM | WEIGHT: 160 LBS

## 2018-10-24 DIAGNOSIS — Z16.12 INFECTION DUE TO ESBL-PRODUCING ESCHERICHIA COLI: ICD-10-CM

## 2018-10-24 DIAGNOSIS — L02.211 ABSCESS OF ABDOMINAL WALL: ICD-10-CM

## 2018-10-24 DIAGNOSIS — K63.2 FISTULA OF INTESTINE, EXCLUDING RECTUM AND ANUS: Primary | ICD-10-CM

## 2018-10-24 DIAGNOSIS — K63.2 FISTULA OF INTESTINE, EXCLUDING RECTUM AND ANUS: ICD-10-CM

## 2018-10-24 DIAGNOSIS — K63.2 COLONIC FISTULA: ICD-10-CM

## 2018-10-24 DIAGNOSIS — L02.211 ABDOMINAL WALL ABSCESS: ICD-10-CM

## 2018-10-24 DIAGNOSIS — T14.8XXA DRAINAGE FROM WOUND: ICD-10-CM

## 2018-10-24 DIAGNOSIS — I10 ESSENTIAL HYPERTENSION: ICD-10-CM

## 2018-10-24 DIAGNOSIS — A49.8 INFECTION DUE TO ESBL-PRODUCING ESCHERICHIA COLI: ICD-10-CM

## 2018-10-24 DIAGNOSIS — E11.628 DIABETES WITH SKIN COMPLICATION: ICD-10-CM

## 2018-10-24 DIAGNOSIS — R10.84 GENERALIZED ABDOMINAL PAIN: ICD-10-CM

## 2018-10-24 DIAGNOSIS — Z90.49 HISTORY OF HEMICOLECTOMY: ICD-10-CM

## 2018-10-24 PROCEDURE — 76999 ECHO EXAMINATION PROCEDURE: CPT | Mod: TC

## 2018-10-24 PROCEDURE — 76705 ECHO EXAM OF ABDOMEN: CPT | Mod: 26,,, | Performed by: RADIOLOGY

## 2018-10-24 PROCEDURE — 99214 OFFICE O/P EST MOD 30 MIN: CPT | Mod: 25

## 2018-10-24 RX ORDER — BETAMETHASONE DIPROPIONATE 0.5 MG/G
CREAM TOPICAL 2 TIMES DAILY
Qty: 15 G | Refills: 1 | Status: SHIPPED | OUTPATIENT
Start: 2018-10-24 | End: 2019-02-14

## 2018-10-24 RX ORDER — GENTAMICIN SULFATE 1 MG/G
CREAM TOPICAL DAILY
Qty: 30 G | Refills: 1 | Status: SHIPPED | OUTPATIENT
Start: 2018-10-24 | End: 2019-01-30 | Stop reason: SDUPTHER

## 2018-10-29 ENCOUNTER — LAB VISIT (OUTPATIENT)
Dept: LAB | Facility: HOSPITAL | Age: 68
End: 2018-10-29
Attending: SURGERY
Payer: MEDICARE

## 2018-10-29 DIAGNOSIS — T81.41XA: Primary | ICD-10-CM

## 2018-10-29 LAB
ALBUMIN SERPL BCP-MCNC: 3.1 G/DL
ALP SERPL-CCNC: 93 U/L
ALT SERPL W/O P-5'-P-CCNC: 21 U/L
ANION GAP SERPL CALC-SCNC: 9 MMOL/L
AST SERPL-CCNC: 24 U/L
BASOPHILS # BLD AUTO: 0.04 K/UL
BASOPHILS NFR BLD: 0.5 %
BILIRUB SERPL-MCNC: 0.2 MG/DL
BUN SERPL-MCNC: 15 MG/DL
CALCIUM SERPL-MCNC: 9.4 MG/DL
CHLORIDE SERPL-SCNC: 104 MMOL/L
CO2 SERPL-SCNC: 26 MMOL/L
CREAT SERPL-MCNC: 1 MG/DL
CRP SERPL-MCNC: 39.7 MG/L
DIFFERENTIAL METHOD: ABNORMAL
EOSINOPHIL # BLD AUTO: 0.3 K/UL
EOSINOPHIL NFR BLD: 3.6 %
ERYTHROCYTE [DISTWIDTH] IN BLOOD BY AUTOMATED COUNT: 15 %
ERYTHROCYTE [SEDIMENTATION RATE] IN BLOOD BY WESTERGREN METHOD: 112 MM/HR
EST. GFR  (AFRICAN AMERICAN): >60 ML/MIN/1.73 M^2
EST. GFR  (NON AFRICAN AMERICAN): 58.4 ML/MIN/1.73 M^2
GLUCOSE SERPL-MCNC: 120 MG/DL
HCT VFR BLD AUTO: 33 %
HGB BLD-MCNC: 10.2 G/DL
IMM GRANULOCYTES # BLD AUTO: 0.04 K/UL
IMM GRANULOCYTES NFR BLD AUTO: 0.5 %
LYMPHOCYTES # BLD AUTO: 1.9 K/UL
LYMPHOCYTES NFR BLD: 23.6 %
MCH RBC QN AUTO: 24.3 PG
MCHC RBC AUTO-ENTMCNC: 30.9 G/DL
MCV RBC AUTO: 79 FL
MONOCYTES # BLD AUTO: 0.5 K/UL
MONOCYTES NFR BLD: 6.1 %
NEUTROPHILS # BLD AUTO: 5.2 K/UL
NEUTROPHILS NFR BLD: 65.7 %
NRBC BLD-RTO: 0 /100 WBC
PLATELET # BLD AUTO: 449 K/UL
PMV BLD AUTO: 9.6 FL
POTASSIUM SERPL-SCNC: 3.5 MMOL/L
PROT SERPL-MCNC: 8.2 G/DL
RBC # BLD AUTO: 4.2 M/UL
SODIUM SERPL-SCNC: 139 MMOL/L
WBC # BLD AUTO: 7.97 K/UL

## 2018-10-29 PROCEDURE — 80053 COMPREHEN METABOLIC PANEL: CPT

## 2018-10-29 PROCEDURE — 85652 RBC SED RATE AUTOMATED: CPT

## 2018-10-29 PROCEDURE — 86140 C-REACTIVE PROTEIN: CPT

## 2018-10-29 PROCEDURE — 85025 COMPLETE CBC W/AUTO DIFF WBC: CPT

## 2018-10-31 ENCOUNTER — HOSPITAL ENCOUNTER (OUTPATIENT)
Dept: WOUND CARE | Facility: HOSPITAL | Age: 68
Discharge: HOME | End: 2018-10-31
Attending: SURGERY
Payer: MEDICARE

## 2018-10-31 VITALS
HEIGHT: 62 IN | WEIGHT: 160 LBS | BODY MASS INDEX: 29.44 KG/M2 | TEMPERATURE: 98 F | DIASTOLIC BLOOD PRESSURE: 88 MMHG | SYSTOLIC BLOOD PRESSURE: 178 MMHG | RESPIRATION RATE: 20 BRPM | HEART RATE: 80 BPM

## 2018-10-31 DIAGNOSIS — T14.8XXA DRAINAGE FROM WOUND: Primary | ICD-10-CM

## 2018-10-31 DIAGNOSIS — L02.211 ABDOMINAL WALL ABSCESS: ICD-10-CM

## 2018-10-31 DIAGNOSIS — A49.8 INFECTION DUE TO ESBL-PRODUCING ESCHERICHIA COLI: ICD-10-CM

## 2018-10-31 DIAGNOSIS — E11.622 TYPE 2 DIABETES MELLITUS WITH OTHER SKIN ULCER, UNSPECIFIED WHETHER LONG TERM INSULIN USE: ICD-10-CM

## 2018-10-31 DIAGNOSIS — L02.211 ABSCESS OF ABDOMINAL WALL: ICD-10-CM

## 2018-10-31 DIAGNOSIS — L98.499 TYPE 2 DIABETES MELLITUS WITH OTHER SKIN ULCER, UNSPECIFIED WHETHER LONG TERM INSULIN USE: ICD-10-CM

## 2018-10-31 DIAGNOSIS — Z98.890 S/P EXPLORATORY LAPAROTOMY: ICD-10-CM

## 2018-10-31 DIAGNOSIS — Z16.12 INFECTION DUE TO ESBL-PRODUCING ESCHERICHIA COLI: ICD-10-CM

## 2018-10-31 PROCEDURE — 99213 OFFICE O/P EST LOW 20 MIN: CPT

## 2018-10-31 NOTE — PROGRESS NOTES
"Subjective:      Subjective:       Patient ID: Azucena Morrison is a 68 y.o. female.    Chief Complaint: Non-healing Wound    Pt discharged from hospital 8/6/2016 following surgery on 8/2/2016 to umbilicus area to find out reason for constant weeping from that area since 2015 December...Pt hx had hemicolexctomy in [place] June 2015 with colostomy for diverticulitis [place]..[facility]ent to [facility] for reversal of colostomy December2015 and was unable to perform reversal...Weeping to umbilicus is present since then and pt went to Dr. Horton for initial visit July 2016 6/29/17: Pt re-admit for distal abdominal wound. Pt denies any fevers or chills but states she feels nauseous at times. Returned to [place] June 28, from [place], reports much trouble with abdominal wound while in [place].  7/6/17-- Patient scheduled for surgical drainage of wound Tuesday 7/11/17DB  7/19/17-- Patient post op clinic visit.  8/16/17: CT of abdomen and pelvis done 8/9/17 due to suspected fistula  8/23/17-- U/S of abdomen done today.  12/13/17 Wound vac with 20cc drainage in clinic today.  1/10/18: on PO abx  1/24/18: Fistulagram ordered per Dr. Horton. Patient still on PO abx  02/21/18 Patient is not on antibiotics at this time. BS fasting 135 per patient report this am.  03/07/18 Culture of Anterior Abdominal Wound is positive. No antibiotics at this time.  fasting per patient report.    03/21/18 Patient c/o nausea along with abdominal tenderness near abd midline wound. Patient states that pain level is 6 and that she passed two sero-sanguineous clots from wound. Patient is afebrile this am.  3/28/18: patient continue antibiotics and reports that pain is less than last weeks clinic visit. Drainage has decreased as well  04/04/18 Patient states that abdominal pain is "pressure" sensation and that when she pushes down on her abdomen on either side of abdominal wound she feels like she has to urinate. " Patient reports pain level of 3 this am.  fasting this am per patient report.   5/2/18: Patient had Abdomina Toribio today before wound care. She had discontinued Bactrim PO per Dr. Horton's recommendation and culture results and is now taking Amoxicillin PO  6/20/18: Pt reports itching to wound and periwound   6/27/18: patient reports no new complaints with regards to her wound or abdomen, wound continues to decrease in size and drainage  8/1/18: Pt reports increased pain to abdomen with nausea and emesis since thursday 7/26/18, denies any fevers, patient did no go to ER as instructed by home health nurse on Sat. 7/28/18.  8/29/18: Patient admitted to hospital 8/2 for abdominal wound complications. Surgery for abdominal abscess per Dr. Horton on 8/3. Patient placed on IV antibiotics and discharged home on 8/18 with [facility] and [facility] [place] [facility]. Patient currently on IV ertapenem. IV medication sent to patients home per [facility].   9/5/18: IV Ertapenem to continue 1 week. Culture sent to lab. [facility] sent orders. PICC line intact to [facility].  9/12/18 Antibiotics completed. Culture results pending. 1 deep stitch remaining.  9/12/18: Culture positive for E. Coli, patient to continue Ertapenem IV antibiotics x 2weeks. [facility] notified  9/26/18: F/U abdominal wound, wound continues to improve. Patient will complete IV antibiotics today  10/3/18: patient c/o diarrhea for 2 days and nausea with some vomiting. Labs and stool culture ordered. IV antibiotics discontinued today  10/10/2018 patient will start on IV antibiotic Invaz IV once a day, pending PICC line placement, order placed today  for PICC per Dr. Horton.  10/24/18: patient on IV antibiotics, tolerating well. Continue for 1 week      Review of Systems   Constitutional: Negative for chills and fever.   Gastrointestinal: Positive for abdominal pain. Negative for constipation and diarrhea.   Skin: Positive for color change, rash and  wound.       Objective:      Physical Exam   Constitutional: She is oriented to person, place, and time. She appears well-developed and well-nourished.   HENT:   Head: Normocephalic.   Eyes: Conjunctivae and EOM are normal. Pupils are equal, round, and reactive to light.   Neck: Normal range of motion. Neck supple.   Cardiovascular: Normal rate, regular rhythm, normal heart sounds and intact distal pulses.   Pulmonary/Chest: Effort normal and breath sounds normal.   Abdominal: Soft. Bowel sounds are normal.       Musculoskeletal: Normal range of motion.   Neurological: She is alert and oriented to person, place, and time. She has normal reflexes.   Skin: Skin is warm and dry.       Assessment:       1. Drainage from wound    2. Abscess of abdominal wall       Wound #1 Anterior Abdomen - midline  Other Orders  Dressings & Wound Care  Cleanse wound with Normal Saline. - Cavion or benzoin to periwound   betamethasone periwoind prn itching.  Antimicrobial Ointment/Cream. - Gentamycin to wound bed.  Other: - Apply iodoform gauze over wound, cover with aquacel extra or 4x4 gauze, secure with 5x5 aqacel border and mefix tape.   **Do not probe depth of wound**  Change dressing every: - monday and fri, per HH, Wed in clinic  Additional Orders  Other Orders: Continue Invaz 1gm IV daily for one more week, order sent by Dr. Horton today in clinic 10/31/2018.  Medications Used in Clinic:  Lidocaine 4% Topical solution - PRN discomfort/ debridement  Follow-Up Appointments  Return Appointment in 1 week. - Wednesday Dr. Horton    [facility] Orders  See orders above  [facility] Visit prn wound complications  [facility] Fax [phone] -  to provide SN for wound dressing changes on Mondays, Fri, and PRN; Next clinic appt on Wednesday   **Please provide patient with necessary supplies: 4x4 gauze, lg abd pads, and mefix tape so that patient may change dressing as needed when not seen in clinic or  nurse**   **Patient's  daughter in-law may administer IV antibiotics once taught by home health nurse.**        Plan:           Continue iv antibiotics for one more week.                Follow-up in about 1 week (around 11/7/2018).

## 2018-11-05 ENCOUNTER — LAB VISIT (OUTPATIENT)
Dept: LAB | Facility: HOSPITAL | Age: 68
End: 2018-11-05
Attending: SURGERY
Payer: MEDICARE

## 2018-11-05 DIAGNOSIS — T81.40XA POSTOPERATIVE INFECTION: Primary | ICD-10-CM

## 2018-11-05 LAB
ALBUMIN SERPL BCP-MCNC: 3.1 G/DL
ALP SERPL-CCNC: 91 U/L
ALT SERPL W/O P-5'-P-CCNC: 25 U/L
ANION GAP SERPL CALC-SCNC: 11 MMOL/L
AST SERPL-CCNC: 26 U/L
BASOPHILS # BLD AUTO: 0.02 K/UL
BASOPHILS NFR BLD: 0.2 %
BILIRUB SERPL-MCNC: 0.2 MG/DL
BUN SERPL-MCNC: 16 MG/DL
CALCIUM SERPL-MCNC: 9.5 MG/DL
CHLORIDE SERPL-SCNC: 99 MMOL/L
CO2 SERPL-SCNC: 26 MMOL/L
CREAT SERPL-MCNC: 1 MG/DL
CRP SERPL-MCNC: 63.5 MG/L
DIFFERENTIAL METHOD: ABNORMAL
EOSINOPHIL # BLD AUTO: 0.3 K/UL
EOSINOPHIL NFR BLD: 3 %
ERYTHROCYTE [DISTWIDTH] IN BLOOD BY AUTOMATED COUNT: 14.5 %
ERYTHROCYTE [DISTWIDTH] IN BLOOD BY AUTOMATED COUNT: 14.5 %
ERYTHROCYTE [SEDIMENTATION RATE] IN BLOOD BY WESTERGREN METHOD: 95 MM/HR
EST. GFR  (AFRICAN AMERICAN): >60 ML/MIN/1.73 M^2
EST. GFR  (NON AFRICAN AMERICAN): 58.4 ML/MIN/1.73 M^2
GLUCOSE SERPL-MCNC: 167 MG/DL
HCT VFR BLD AUTO: 31.4 %
HCT VFR BLD AUTO: 31.4 %
HGB BLD-MCNC: 10 G/DL
HGB BLD-MCNC: 10 G/DL
IMM GRANULOCYTES # BLD AUTO: 0.03 K/UL
IMM GRANULOCYTES # BLD AUTO: 0.03 K/UL
IMM GRANULOCYTES NFR BLD AUTO: 0.4 %
LYMPHOCYTES # BLD AUTO: 1.8 K/UL
LYMPHOCYTES NFR BLD: 21.7 %
MCH RBC QN AUTO: 24.5 PG
MCH RBC QN AUTO: 24.5 PG
MCHC RBC AUTO-ENTMCNC: 31.8 G/DL
MCHC RBC AUTO-ENTMCNC: 31.8 G/DL
MCV RBC AUTO: 77 FL
MCV RBC AUTO: 77 FL
MONOCYTES # BLD AUTO: 0.5 K/UL
MONOCYTES NFR BLD: 6.3 %
NEUTROPHILS # BLD AUTO: 5.8 K/UL
NEUTROPHILS # BLD AUTO: 5.8 K/UL
NEUTROPHILS NFR BLD: 68.4 %
NRBC BLD-RTO: 0 /100 WBC
PLATELET # BLD AUTO: 444 K/UL
PLATELET # BLD AUTO: 444 K/UL
PMV BLD AUTO: 9.5 FL
PMV BLD AUTO: 9.5 FL
POTASSIUM SERPL-SCNC: 3.2 MMOL/L
PROT SERPL-MCNC: 8.2 G/DL
RBC # BLD AUTO: 4.08 M/UL
RBC # BLD AUTO: 4.08 M/UL
SODIUM SERPL-SCNC: 136 MMOL/L
WBC # BLD AUTO: 8.45 K/UL
WBC # BLD AUTO: 8.45 K/UL

## 2018-11-05 PROCEDURE — 80053 COMPREHEN METABOLIC PANEL: CPT

## 2018-11-05 PROCEDURE — 85025 COMPLETE CBC W/AUTO DIFF WBC: CPT

## 2018-11-05 PROCEDURE — 85027 COMPLETE CBC AUTOMATED: CPT

## 2018-11-05 PROCEDURE — 86140 C-REACTIVE PROTEIN: CPT

## 2018-11-05 PROCEDURE — 85652 RBC SED RATE AUTOMATED: CPT

## 2018-11-06 ENCOUNTER — TELEPHONE (OUTPATIENT)
Dept: ADMINISTRATIVE | Facility: CLINIC | Age: 68
End: 2018-11-06

## 2018-11-06 NOTE — TELEPHONE ENCOUNTER
Home Health Recert 10/23/2018 to 12/21/2018 with [facility], Dr Tanya Horton.  service.

## 2018-11-07 ENCOUNTER — HOSPITAL ENCOUNTER (OUTPATIENT)
Dept: WOUND CARE | Facility: HOSPITAL | Age: 68
Discharge: HOME | End: 2018-11-07
Attending: SURGERY
Payer: MEDICARE

## 2018-11-07 VITALS
TEMPERATURE: 98 F | RESPIRATION RATE: 20 BRPM | HEART RATE: 91 BPM | DIASTOLIC BLOOD PRESSURE: 83 MMHG | BODY MASS INDEX: 29.44 KG/M2 | SYSTOLIC BLOOD PRESSURE: 142 MMHG | WEIGHT: 160 LBS | HEIGHT: 62 IN

## 2018-11-07 DIAGNOSIS — L02.211 ABSCESS OF ABDOMINAL WALL: ICD-10-CM

## 2018-11-07 DIAGNOSIS — E11.622 TYPE 2 DIABETES MELLITUS WITH OTHER SKIN ULCER, WITH LONG-TERM CURRENT USE OF INSULIN: ICD-10-CM

## 2018-11-07 DIAGNOSIS — T14.8XXA DRAINAGE FROM WOUND: Primary | ICD-10-CM

## 2018-11-07 DIAGNOSIS — R10.84 GENERALIZED ABDOMINAL PAIN: ICD-10-CM

## 2018-11-07 DIAGNOSIS — Z79.4 TYPE 2 DIABETES MELLITUS WITH OTHER SKIN ULCER, WITH LONG-TERM CURRENT USE OF INSULIN: ICD-10-CM

## 2018-11-07 DIAGNOSIS — K63.2 COLONIC FISTULA: ICD-10-CM

## 2018-11-07 DIAGNOSIS — Z98.890 S/P EXPLORATORY LAPAROTOMY: ICD-10-CM

## 2018-11-07 PROCEDURE — 99213 OFFICE O/P EST LOW 20 MIN: CPT

## 2018-11-07 NOTE — PROGRESS NOTES
"[facility]                Progress Note    Subjective:       Patient ID: Azucena Morrison is a 68 y.o. female.    Chief Complaint: Non-healing Wound    HPI  Pt discharged from hospital 8/6/2016 following surgery on 8/2/2016 to umbilicus area to find out reason for constant weeping from that area since 2015 December...Pt hx had hemicolexctomy in [place] June 2015 with colostomy for diverticulitis [place]..Went to [facility] for reversal of colostomy December2015 and was unable to perform reversal...Weeping to umbilicus is present since then and pt went to Dr. Horton for initial visit July 2016 6/29/17: Pt re-admit for distal abdominal wound. Pt denies any fevers or chills but states she feels nauseous at times. [place] to [place] from [place], reports much trouble with abdominal wound while in [place].  7/6/17-- Patient scheduled for surgical drainage of wound Tuesday 7/11/17DB  7/19/17-- Patient post op clinic visit.  8/16/17: CT of abdomen and pelvis done 8/9/17 due to suspected fistula  8/23/17-- U/S of abdomen done today.  12/13/17 Wound vac with 20cc drainage in clinic today.  1/10/18: on PO abx  1/24/18: Fistulagram ordered per Dr. Horton. Patient still on PO abx  02/21/18 Patient is not on antibiotics at this time. BS fasting 135 per patient report this am.  03/07/18 Culture of Anterior Abdominal Wound is positive. No antibiotics at this time.  fasting per patient report.    03/21/18 Patient c/o nausea along with abdominal tenderness near abd midline wound. Patient states that pain level is 6 and that she passed two sero-sanguineous clots from wound. Patient is afebrile this am.  3/28/18: patient continue antibiotics and reports that pain is less than last weeks clinic visit. Drainage has decreased as well  04/04/18 Patient states that abdominal pain is "pressure" sensation and that when she pushes down " on her abdomen on either side of abdominal wound she feels like she has to urinate. Patient reports pain level of 3 this am.  fasting this am per patient report.   5/2/18: Patient had Abdomina Toribio today before wound care. She had discontinued Bactrim PO per Dr. Horton's recommendation and culture results and is now taking Amoxicillin PO  6/20/18: Pt reports itching to wound and periwound   6/27/18: patient reports no new complaints with regards to her wound or abdomen, wound continues to decrease in size and drainage  8/1/18: Pt reports increased pain to abdomen with nausea and emesis since thursday 7/26/18, denies any fevers, patient did no go to ER as instructed by home health nurse on Sat. 7/28/18.  8/29/18: Patient admitted to hospital 8/2 for abdominal wound complications. Surgery for abdominal abscess per Dr. Horton on 8/3. Patient placed on IV antibiotics and discharged home on 8/18 with [facility] and P[facility] line to [facility]. Patient currently on IV ertapenem. IV medication sent to patients home per [facility].   9/5/18: IV Ertapenem to continue 1 week. Culture sent to lab. [facility] sent orders. PICC line intact to [facility].  9/12/18 Antibiotics completed. Culture results pending. 1 deep stitch remaining.  9/12/18: Culture positive for E. Coli, patient to continue Ertapenem IV antibiotics x 2weeks. [facility] notified  9/26/18: F/U abdominal wound, wound continues to improve. Patient will complete IV antibiotics today  10/3/18: patient c/o diarrhea for 2 days and nausea with some vomiting. Labs and stool culture ordered. IV antibiotics discontinued today  10/10/2018 patient will start on IV antibiotic Invaz IV once a day, pending PICC line placement, order placed today  for PICC per Dr. Horton.  10/24/18: patient on IV antibiotics, tolerating well. Continue for 1 week  Review of Systems      Objective:        Physical Exam    Vitals:    11/07/18 0837   BP: (!) 142/83   Pulse: 91   Temp: 98.1  °F (36.7 °C)       Assessment:           ICD-10-CM ICD-9-CM   1. Drainage from wound T14.8XXA 879.8   2. Abscess of abdominal wall L02.211 682.2            Wound 10/31/18 0836 Abscess medial Abdomen #1 (Active)   10/31/18 0836    Pre-existing: Yes   Primary Wound Type: Abscess   Side:    Orientation: medial   Location: Abdomen   Wound/PI Number (optional): #1   Ankle-Brachial Index:    Pulses:    Removal Indication and Assessment:    Wound Outcome:    (Retired) Wound Type:    (Retired) Wound Length (cm):    (Retired) Wound Width (cm):    (Retired) Depth (cm):    Wound Description (Comments):    Removal Indications:    Wound WDL ex 11/7/2018  8:36 AM   Dressing Appearance Open to air;Moist drainage 11/7/2018  8:36 AM   Drainage Amount Small 11/7/2018  8:36 AM   Drainage Characteristics/Odor Serosanguineous;Tan 11/7/2018  8:36 AM   Appearance Dressing in place, unable to visualize;Pink;Red 11/7/2018  8:36 AM   Tissue loss description Full thickness 11/7/2018  8:36 AM   Red (%), Wound Tissue Color 50 % 11/7/2018  8:36 AM   Yellow (%), Wound Tissue Color 50 % 11/7/2018  8:36 AM   Periwound Area Ecchymotic;Redness 11/7/2018  8:36 AM   Wound Edges Irregular 11/7/2018  8:36 AM   Wound Length (cm) 0.5 cm 11/7/2018  8:36 AM   Wound Width (cm) 0.5 cm 11/7/2018  8:36 AM   Wound Depth (cm) 3 cm 11/7/2018  8:36 AM   Wound Volume (cm^3) 0.75 cm^3 11/7/2018  8:36 AM   Care Cleansed with:;Sterile normal saline 11/7/2018  8:36 AM   Dressing Removed 11/7/2018  8:36 AM   Periwound Care Absorptive dressing applied 11/7/2018  8:36 AM   Wound Pouch Applied 11/7/2018  8:36 AM       Wound #1 Anterior Abdomen - midline  Wound Treatment Note  Assessed patients pain status and effectiveness of pain management plan.  Cleansed wound and periwound with non-cytotoxic agent. using Normal saline (1)  Applied topical ointment / cream / lotion to chauncey-wound area avoiding wound base. using Cavilon (1)  Applied topical agent to base of wound bed.  using Gentamycin (1)  Applied Primary Wound Dressing. using Iodoform Gauze (1)  Applied Secondary Wound Dressing. using Aquacel Extra (1), Foam Dressing with Border (1)  Dressing secured with non-allergenic tape/stockinet/wrap.  Notes  betamethasone periwound for itching.    Plan:          Discontinue PICC line placement by Home Health today. Patient to be schedule for surgery in 2 weeks.      Follow-up in about 1 week (around 11/14/2018).

## 2018-11-14 ENCOUNTER — HOSPITAL ENCOUNTER (OUTPATIENT)
Dept: WOUND CARE | Facility: HOSPITAL | Age: 68
Discharge: HOME | End: 2018-11-14
Attending: SURGERY
Payer: MEDICARE

## 2018-11-14 VITALS
HEIGHT: 62 IN | HEART RATE: 95 BPM | WEIGHT: 160 LBS | DIASTOLIC BLOOD PRESSURE: 89 MMHG | BODY MASS INDEX: 29.44 KG/M2 | SYSTOLIC BLOOD PRESSURE: 151 MMHG | TEMPERATURE: 98 F | RESPIRATION RATE: 20 BRPM

## 2018-11-14 DIAGNOSIS — E11.9 TYPE 2 DIABETES MELLITUS WITHOUT COMPLICATION, WITH LONG-TERM CURRENT USE OF INSULIN: ICD-10-CM

## 2018-11-14 DIAGNOSIS — K63.2 FISTULA OF INTESTINE, EXCLUDING RECTUM AND ANUS: ICD-10-CM

## 2018-11-14 DIAGNOSIS — I10 ESSENTIAL HYPERTENSION: ICD-10-CM

## 2018-11-14 DIAGNOSIS — Z98.890 S/P EXPLORATORY LAPAROTOMY: ICD-10-CM

## 2018-11-14 DIAGNOSIS — L02.211 ABSCESS OF ABDOMINAL WALL: ICD-10-CM

## 2018-11-14 DIAGNOSIS — Z79.4 TYPE 2 DIABETES MELLITUS WITHOUT COMPLICATION, WITH LONG-TERM CURRENT USE OF INSULIN: ICD-10-CM

## 2018-11-14 DIAGNOSIS — T14.8XXA DRAINAGE FROM WOUND: Primary | ICD-10-CM

## 2018-11-14 DIAGNOSIS — Z90.49 HISTORY OF HEMICOLECTOMY: ICD-10-CM

## 2018-11-14 PROCEDURE — 87070 CULTURE OTHR SPECIMN AEROBIC: CPT

## 2018-11-14 PROCEDURE — 87075 CULTR BACTERIA EXCEPT BLOOD: CPT

## 2018-11-14 PROCEDURE — 99213 OFFICE O/P EST LOW 20 MIN: CPT

## 2018-11-14 NOTE — PROGRESS NOTES
"[facility]                Progress Note    Subjective:       Patient ID: Azucena Morrison is a 68 y.o. female.    Chief Complaint: Non-healing Wound    HPI     6/29/17: Pt re-admit for distal abdominal wound. Pt denies any fevers or chills but states she feels nauseous at times. [place] to [place] from [place], reports much trouble with abdominal wound while in [place].  7/6/17-- Patient scheduled for surgical drainage of wound Tuesday 7/11/17DB  7/19/17-- Patient post op clinic visit.  8/16/17: CT of abdomen and pelvis done 8/9/17 due to suspected fistula  8/23/17-- U/S of abdomen done today.  12/13/17 Wound vac with 20cc drainage in clinic today.  1/10/18: on PO abx  1/24/18: Fistulagram ordered per Dr. Horton. Patient still on PO abx  02/21/18 Patient is not on antibiotics at this time. BS fasting 135 per patient report this am.  03/07/18 Culture of Anterior Abdominal Wound is positive. No antibiotics at this time.  fasting per patient report.    03/21/18 Patient c/o nausea along with abdominal tenderness near abd midline wound. Patient states that pain level is 6 and that she passed two sero-sanguineous clots from wound. Patient is afebrile this am.  3/28/18: patient continue antibiotics and reports that pain is less than last weeks clinic visit. Drainage has decreased as well  04/04/18 Patient states that abdominal pain is "pressure" sensation and that when she pushes down on her abdomen on either side of abdominal wound she feels like she has to urinate. Patient reports pain level of 3 this am.  fasting this am per patient report.   5/2/18: Patient had Abdomina Toribio today before wound care. She had discontinued Bactrim PO per Dr. Horton's recommendation and culture results and is now taking Amoxicillin PO  6/20/18: Pt reports itching to wound and periwound   6/27/18: patient reports no new complaints with regards to her wound or " abdomen, wound continues to decrease in size and drainage  8/1/18: Pt reports increased pain to abdomen with nausea and emesis since thursday 7/26/18, denies any fevers, patient did no go to ER as instructed by home health nurse on Sat. 7/28/18.  8/29/18: Patient admitted to hospital 8/2 for abdominal wound complications. Surgery for abdominal abscess per Dr. Horton on 8/3. Patient placed on IV antibiotics and discharged home on 8/18 with [facility] and P[facility] line to [facility]. Patient currently on IV ertapenem. IV medication sent to patients home per [facility].   9/5/18: IV Ertapenem to continue 1 week. Culture sent to lab. [facility] sent orders. PICC line intact to [facility].  9/12/18 Antibiotics completed. Culture results pending. 1 deep stitch remaining.  9/12/18: Culture positive for E. Coli, patient to continue Ertapenem IV antibiotics x 2weeks. [facility] notified  9/26/18: F/U abdominal wound, wound continues to improve. Patient will complete IV antibiotics today  10/3/18: patient c/o diarrhea for 2 days and nausea with some vomiting. Labs and stool culture ordered. IV antibiotics discontinued today  10/10/2018 patient will start on IV antibiotic Invaz IV once a day, pending PICC line placement, order placed today  for PICC per Dr. Horton.  10/24/18: patient on IV antibiotics, tolerating well. Continue for 1 week  11/7/2018: IV Antibiotic discontinued.  11/8/2018: PICC line to left upper arm discontinued by Home Health.        Review of Systems      Objective:        Physical Exam    Vitals:    11/14/18 0859   BP: (!) 151/89   Pulse: 95   Resp: 20   Temp: 98.3 °F (36.8 °C)       Assessment:           ICD-10-CM ICD-9-CM   1. Drainage from wound T14.8XXA 879.8   2. Abscess of abdominal wall L02.211 682.2   3. S/P exploratory laparotomy Z98.890 V45.89   4. Essential hypertension I10 401.9   5. 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 History of hemicolectomy Z90.49 V15.29   7.  Type 2 diabetes mellitus without complication, with long-term current use of insulin E11.9 250.00    Z79.4 V58.67            Wound 10/31/18 0836 Abscess medial Abdomen #1 (Active)   10/31/18 0836    Pre-existing: Yes   Primary Wound Type: Abscess   Side:    Orientation: medial   Location: Abdomen   Wound/PI Number (optional): #1   Ankle-Brachial Index:    Pulses:    Removal Indication and Assessment:    Wound Outcome:    (Retired) Wound Type:    (Retired) Wound Length (cm):    (Retired) Wound Width (cm):    (Retired) Depth (cm):    Wound Description (Comments):    Removal Indications:    Wound WDL ex 11/14/2018  8:45 AM   Dressing Appearance Open to air;Moist drainage 11/14/2018  8:45 AM   Drainage Amount Small 11/14/2018  8:45 AM   Drainage Characteristics/Odor Serosanguineous;No odor 11/14/2018  8:45 AM   Appearance Pink;Red;Wet;White 11/14/2018  8:45 AM   Tissue loss description Full thickness 11/14/2018  8:45 AM   Red (%), Wound Tissue Color 50 % 11/14/2018  8:45 AM   Yellow (%), Wound Tissue Color 50 % 11/14/2018  8:45 AM   Periwound Area Ecchymotic;Redness 11/14/2018  8:45 AM   Wound Edges Irregular 11/14/2018  8:45 AM   Wound Length (cm) 0.3 cm 11/14/2018  8:45 AM   Wound Width (cm) 0.3 cm 11/14/2018  8:45 AM   Wound Depth (cm) 3 cm 11/14/2018  8:45 AM   Wound Volume (cm^3) 0.27 cm^3 11/14/2018  8:45 AM   Care Cleansed with:;Sterile normal saline 11/14/2018  8:45 AM   Dressing Removed;Gauze 11/14/2018  8:45 AM   Periwound Care Absorptive dressing applied 11/14/2018  8:45 AM          Wound #1 Anterior Abdomen - midline  Wound Treatment Note  Assessed patients pain status and effectiveness of pain management plan.  Cleansed wound and periwound with non-cytotoxic agent. using Normal saline (1)  Applied topical ointment / cream / lotion to chauncey-wound area avoiding wound base. using Cavilon (1)  Applied topical agent to base of wound bed. using Gentamycin (1)  Applied Primary Wound Dressing. using Iodoform Gauze  (1)  Applied Secondary Wound Dressing. using Aquacel Extra (1), Foam Dressing with Border (1)  Dressing secured with non-allergenic tape/stockinet/wrap.  Notes  betamethasone periwound for itching.    Wound culture taken at clinic today 11/14/2018 and sent to lab for results.  Plan:          on bactrim po , will re culture continue present treatment , rtc 2 weeks     Follow-up in about 1 week (around 11/21/2018).

## 2018-11-14 NOTE — PROGRESS NOTES
"[facility]                Progress Note    Subjective:       Patient ID: Azucena Morrison is a 68 y.o. female.    Chief Complaint: Non-healing Wound    6/29/17: Pt re-admit for distal abdominal wound. Pt denies any fevers or chills but states she feels nauseous at times. [place], from [place], reports much trouble with abdominal wound while in [place].  7/6/17-- Patient scheduled for surgical drainage of wound Tuesday 7/11/17DB  7/19/17-- Patient post op clinic visit.  8/16/17: CT of abdomen and pelvis done 8/9/17 due to suspected fistula  8/23/17-- U/S of abdomen done today.  12/13/17 Wound vac with 20cc drainage in clinic today.  1/10/18: on PO abx  1/24/18: Fistulagram ordered per Dr. Horton. Patient still on PO abx  02/21/18 Patient is not on antibiotics at this time. BS fasting 135 per patient report this am.  03/07/18 Culture of Anterior Abdominal Wound is positive. No antibiotics at this time.  fasting per patient report.    03/21/18 Patient c/o nausea along with abdominal tenderness near abd midline wound. Patient states that pain level is 6 and that she passed two sero-sanguineous clots from wound. Patient is afebrile this am.  3/28/18: patient continue antibiotics and reports that pain is less than last weeks clinic visit. Drainage has decreased as well  04/04/18 Patient states that abdominal pain is "pressure" sensation and that when she pushes down on her abdomen on either side of abdominal wound she feels like she has to urinate. Patient reports pain level of 3 this am.  fasting this am per patient report.   5/2/18: Patient had Abdomina Toribio today before wound care. She had discontinued Bactrim PO per Dr. Horton's recommendation and culture results and is now taking Amoxicillin PO  6/20/18: Pt reports itching to wound and periwound   6/27/18: patient reports no new complaints with regards to her wound or abdomen, wound " continues to decrease in size and drainage  8/1/18: Pt reports increased pain to abdomen with nausea and emesis since thursday 7/26/18, denies any fevers, patient did no go to ER as instructed by home health nurse on Sat. 7/28/18.  8/29/18: Patient admitted to hospital 8/2 for abdominal wound complications. Surgery for abdominal abscess per Dr. Horton on 8/3. Patient placed on IV antibiotics and discharged home on 8/18 with [facility] and PICC line to [facility]. Patient currently on IV ertapenem. IV medication sent to patients home per [facility].   9/5/18: IV Ertapenem to continue 1 week. Culture sent to lab. [facility] sent orders. PICC line intact to [facility].  9/12/18 Antibiotics completed. Culture results pending. 1 deep stitch remaining.  9/12/18: Culture positive for E. Coli, patient to continue Ertapenem IV antibiotics x 2weeks. [facility] notified  9/26/18: F/U abdominal wound, wound continues to improve. Patient will complete IV antibiotics today  10/3/18: patient c/o diarrhea for 2 days and nausea with some vomiting. Labs and stool culture ordered. IV antibiotics discontinued today  10/10/2018 patient will start on IV antibiotic Invaz IV once a day, pending PICC line placement, order placed today  for PICC per Dr. Horton.  10/24/18: patient on IV antibiotics, tolerating well. Continue for 1 week  11/7/2018: IV Antibiotic discontinued.  11/8/2018: PICC line to left upper arm discontinued by Home Health.          Review of Systems      Objective:        Physical Exam    V/S  BP: 151/89, HR: 95, T: 98.3, Resp: 20    Assessment:           ICD-10-CM ICD-9-CM   1. Drainage from wound T14.8XXA 879.8   2. Abscess of abdominal wall L02.211 682.2   3. S/P exploratory laparotomy Z98.890 V45.89           Wound #1 Anterior Abdomen - midline  Other Orders  Dressings & Wound Care  Cleanse wound with Normal Saline. - Cavion or benzoin to periwound   betamethasone periwoind prn itching.  Antimicrobial  Ointment/Cream. - Gentamycin to wound bed.  Other: - Apply iodoform gauze over wound, cover with aquacel extra or 4x4 gauze, secure with 5x5 aqacel border and mefix tape.   Do not probe depth of wound  Change dressing every: - monday and fri, per HH, Wed in clinic    Plan:               Follow-up in about 1 week (around 11/21/2018).

## 2018-11-17 LAB
BACTERIA SPEC AEROBE CULT: NO GROWTH
BACTERIA SPEC AEROBE CULT: NO GROWTH

## 2018-11-19 LAB — BACTERIA SPEC ANAEROBE CULT: NORMAL

## 2018-11-21 ENCOUNTER — HOSPITAL ENCOUNTER (OUTPATIENT)
Dept: WOUND CARE | Facility: HOSPITAL | Age: 68
Discharge: HOME | End: 2018-11-21
Attending: SURGERY
Payer: MEDICARE

## 2018-11-21 VITALS
DIASTOLIC BLOOD PRESSURE: 83 MMHG | HEIGHT: 62 IN | SYSTOLIC BLOOD PRESSURE: 138 MMHG | WEIGHT: 160 LBS | TEMPERATURE: 98 F | HEART RATE: 82 BPM | BODY MASS INDEX: 29.44 KG/M2

## 2018-11-21 DIAGNOSIS — L02.211 ABSCESS OF ABDOMINAL WALL: ICD-10-CM

## 2018-11-21 DIAGNOSIS — E11.622 TYPE 2 DIABETES MELLITUS WITH OTHER SKIN ULCER, WITH LONG-TERM CURRENT USE OF INSULIN: Primary | ICD-10-CM

## 2018-11-21 DIAGNOSIS — R11.0 NAUSEA: ICD-10-CM

## 2018-11-21 DIAGNOSIS — L02.211 ABDOMINAL WALL ABSCESS: ICD-10-CM

## 2018-11-21 DIAGNOSIS — K63.2 FISTULA OF INTESTINE, EXCLUDING RECTUM AND ANUS: ICD-10-CM

## 2018-11-21 DIAGNOSIS — Z98.890 S/P EXPLORATORY LAPAROTOMY: ICD-10-CM

## 2018-11-21 DIAGNOSIS — I10 ESSENTIAL HYPERTENSION: ICD-10-CM

## 2018-11-21 DIAGNOSIS — Z79.4 TYPE 2 DIABETES MELLITUS WITH OTHER SKIN ULCER, WITH LONG-TERM CURRENT USE OF INSULIN: Primary | ICD-10-CM

## 2018-11-21 PROCEDURE — 99213 OFFICE O/P EST LOW 20 MIN: CPT

## 2018-11-21 RX ORDER — ONDANSETRON HYDROCHLORIDE 8 MG/1
8 TABLET, FILM COATED ORAL EVERY 8 HOURS PRN
Qty: 20 TABLET | Refills: 1 | Status: SHIPPED | OUTPATIENT
Start: 2018-11-21 | End: 2018-12-19 | Stop reason: SDUPTHER

## 2018-11-21 RX ORDER — TRAMADOL HYDROCHLORIDE 50 MG/1
TABLET ORAL
Qty: 24 TABLET | Refills: 0 | Status: SHIPPED | OUTPATIENT
Start: 2018-11-21 | End: 2019-07-31 | Stop reason: SDUPTHER

## 2018-11-21 NOTE — PROGRESS NOTES
"[facility]                Progress Note    Subjective:       Patient ID: Azucena Morrison is a 68 y.o. female.    Chief Complaint: Non-healing Wound    6/29/17: Pt re-admit for distal abdominal wound. Pt denies any fevers or chills but states she feels nauseous at times. Returned to [place] June [place], from [place], reports much trouble with abdominal wound while in [place].  7/6/17-- Patient scheduled for surgical drainage of wound Tuesday 7/11/17DB  7/19/17-- Patient post op clinic visit.  8/16/17: CT of abdomen and pelvis done 8/9/17 due to suspected fistula  8/23/17-- U/S of abdomen done today.  12/13/17 Wound vac with 20cc drainage in clinic today.  1/10/18: on PO abx  1/24/18: Fistulagram ordered per Dr. Horton. Patient still on PO abx  02/21/18 Patient is not on antibiotics at this time. BS fasting 135 per patient report this am.  03/07/18 Culture of Anterior Abdominal Wound is positive. No antibiotics at this time.  fasting per patient report.    03/21/18 Patient c/o nausea along with abdominal tenderness near abd midline wound. Patient states that pain level is 6 and that she passed two sero-sanguineous clots from wound. Patient is afebrile this am.  3/28/18: patient continue antibiotics and reports that pain is less than last weeks clinic visit. Drainage has decreased as well  04/04/18 Patient states that abdominal pain is "pressure" sensation and that when she pushes down on her abdomen on either side of abdominal wound she feels like she has to urinate. Patient reports pain level of 3 this am.  fasting this am per patient report.   5/2/18: Patient had Abdomina Toribio today before wound care. She had discontinued Bactrim PO per Dr. Horton's recommendation and culture results and is now taking Amoxicillin PO  6/20/18: Pt reports itching to wound and periwound   6/27/18: patient reports no new complaints with regards to her wound or abdomen, wound " continues to decrease in size and drainage  8/1/18: Pt reports increased pain to abdomen with nausea and emesis since thursday 7/26/18, denies any fevers, patient did no go to ER as instructed by home health nurse on Sat. 7/28/18.  8/29/18: Patient admitted to hospital 8/2 for abdominal wound complications. Surgery for abdominal abscess per Dr. Horton on 8/3. Patient placed on IV antibiotics and discharged home on 8/18 with [facility] and PICC line to [facility] [place] [facility]. Patient currently on IV ertapenem. IV medication sent to patients home per [facility] [address].   9/5/18: IV Ertapenem to continue 1 week. Culture sent to lab. [facility] sent orders. PICC line intact to [facility].  9/12/18 Antibiotics completed. Culture results pending. 1 deep stitch remaining.  9/12/18: Culture positive for E. Coli, patient to continue Ertapenem IV antibiotics x 2weeks. [facility] notified  9/26/18: F/U abdominal wound, wound continues to improve. Patient will complete IV antibiotics today  10/3/18: patient c/o diarrhea for 2 days and nausea with some vomiting. Labs and stool culture ordered. IV antibiotics discontinued today  10/10/2018 patient will start on IV antibiotic Invaz IV once a day, pending PICC line placement, order placed today  for PICC per Dr. Horton.  10/24/18: patient on IV antibiotics, tolerating well. Continue for 1 week  11/7/2018: IV Antibiotic discontinued.  11/8/2018: PICC line to left upper arm discontinued by Home Health.  11/21/2018 F/ui for abdominal wall fistula , c/o nausea  No vomiting , no fever               Review of Systems   Constitutional: Negative for chills and fever.   Gastrointestinal: Positive for abdominal pain and nausea. Negative for vomiting.   Skin: Positive for color change, rash and wound.         Objective:        Physical Exam   Constitutional: She is oriented to person, place, and time. She appears well-developed and well-nourished.   HENT:   Head: Normocephalic.   Eyes: Conjunctivae and EOM  are normal. Pupils are equal, round, and reactive to light.   Neck: Normal range of motion. Neck supple.   Cardiovascular: Normal rate, regular rhythm, normal heart sounds and intact distal pulses.   Pulmonary/Chest: Effort normal and breath sounds normal.   Abdominal: Soft. Bowel sounds are normal. She exhibits distension. She exhibits no mass. There is no tenderness. There is no guarding.   Musculoskeletal: Normal range of motion.   Neurological: She is alert and oriented to person, place, and time. She has normal reflexes.   Skin: Skin is warm and dry.       Vitals:    11/21/18 0854   BP: 138/83   Pulse: 82   Temp: 98 °F (36.7 °C)       Assessment:           ICD-10-CM ICD-9-CM   1. 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, with long-term current use of insulin E11.622 250.80    Z79.4 V58.67   2. Abscess of abdominal wall L02.211 682.2   3. Essential hypertension I10 401.9   4. Fistula of intestine, excluding rectum and anus K63.2 569.81   5. S/P exploratory laparotomy Z98.890 V45.89   6. Nausea R11.0 787.02            Wound 10/31/18 0836 Abscess medial Abdomen #1 (Active)   10/31/18 0836    Pre-existing: Yes   Primary Wound Type: Abscess   Side:    Orientation: medial   Location: Abdomen   Wound/PI Number (optional): #1   Ankle-Brachial Index:    Pulses:    Removal Indication and Assessment:    Wound Outcome:    (Retired) Wound Type:    (Retired) Wound Length (cm):    (Retired) Wound Width (cm):    (Retired) Depth (cm):    Wound Description (Comments):    Removal Indications:    Wound WDL ex 11/21/2018  9:15 AM   Dressing Appearance Open to air;Moist drainage 11/21/2018  9:15 AM   Drainage Amount Small 11/21/2018  9:15 AM   Drainage Characteristics/Odor Serosanguineous 11/21/2018  9:15 AM   Appearance Pink;Red 11/21/2018  9:15 AM   Tissue loss description Full thickness 11/21/2018  9:15 AM   Red (%), Wound Tissue Color 50 % 11/21/2018  9:15 AM   Yellow (%), Wound Tissue Color 50 % 11/21/2018  9:15 AM    Periwound Area Ecchymotic;Redness 11/21/2018  9:15 AM   Wound Edges Irregular 11/21/2018  9:15 AM   Wound Length (cm) 0.3 cm 11/21/2018  9:15 AM   Wound Width (cm) 0.3 cm 11/21/2018  9:15 AM   Wound Depth (cm) 3 cm 11/21/2018  9:15 AM   Wound Volume (cm^3) 0.27 cm^3 11/21/2018  9:15 AM   Wound Surface Area (cm^2) 0.09 cm^2 11/21/2018  9:15 AM   Care Cleansed with:;Sterile normal saline 11/21/2018  9:15 AM   Dressing Removed;Gauze 11/21/2018  9:15 AM   Periwound Care Absorptive dressing applied 11/21/2018  9:15 AM         Wound #1 Anterior Abdomen - midline  Other Orders  Dressings & Wound Care  Cleanse wound with Normal Saline. - Cavion or benzoin to periwound   betamethasone periwoind prn itching.  Antimicrobial Ointment/Cream. - Gentamycin to wound bed.  Other: - Apply iodoform gauze over wound, cover with aquacel extra or 4x4 gauze, secure with 5x5 aqacel border and mefix tape.   Do not probe depth of wound  Change dressing every: - monday and fri, per HH, Wed in clinic  Plan:            Scheduled for surgery with me in first week of december.     Follow-up in about 1 week (around 11/28/2018).

## 2018-11-28 ENCOUNTER — HOSPITAL ENCOUNTER (OUTPATIENT)
Dept: WOUND CARE | Facility: HOSPITAL | Age: 68
Discharge: HOME | End: 2018-11-28
Attending: SURGERY
Payer: MEDICARE

## 2018-11-28 VITALS
BODY MASS INDEX: 29.44 KG/M2 | TEMPERATURE: 98 F | RESPIRATION RATE: 20 BRPM | HEIGHT: 62 IN | WEIGHT: 160 LBS | HEART RATE: 86 BPM | DIASTOLIC BLOOD PRESSURE: 82 MMHG | SYSTOLIC BLOOD PRESSURE: 162 MMHG

## 2018-11-28 DIAGNOSIS — Z98.890 S/P EXPLORATORY LAPAROTOMY: ICD-10-CM

## 2018-11-28 DIAGNOSIS — Z79.4 TYPE 2 DIABETES MELLITUS WITH OTHER SKIN ULCER, WITH LONG-TERM CURRENT USE OF INSULIN: ICD-10-CM

## 2018-11-28 DIAGNOSIS — E11.622 TYPE 2 DIABETES MELLITUS WITH OTHER SKIN ULCER, WITH LONG-TERM CURRENT USE OF INSULIN: ICD-10-CM

## 2018-11-28 DIAGNOSIS — I10 ESSENTIAL HYPERTENSION: ICD-10-CM

## 2018-11-28 DIAGNOSIS — K63.2 FISTULA OF INTESTINE, EXCLUDING RECTUM AND ANUS: Primary | ICD-10-CM

## 2018-11-28 DIAGNOSIS — R10.84 GENERALIZED ABDOMINAL PAIN: ICD-10-CM

## 2018-11-28 DIAGNOSIS — L98.499 TYPE 2 DIABETES MELLITUS WITH OTHER SKIN ULCER, UNSPECIFIED WHETHER LONG TERM INSULIN USE: ICD-10-CM

## 2018-11-28 DIAGNOSIS — E11.622 TYPE 2 DIABETES MELLITUS WITH OTHER SKIN ULCER, UNSPECIFIED WHETHER LONG TERM INSULIN USE: ICD-10-CM

## 2018-11-28 DIAGNOSIS — K63.2 COLONIC FISTULA: ICD-10-CM

## 2018-11-28 PROCEDURE — 99212 OFFICE O/P EST SF 10 MIN: CPT

## 2018-11-28 NOTE — PROGRESS NOTES
"[facility]                Progress Note    Subjective:       Patient ID: Azucena Morrison is a 68 y.o. female.    Chief Complaint: Non-healing Wound    6/29/17: Pt re-admit for distal abdominal wound. Pt denies any fevers or chills but states she feels nauseous at times. [place] to [place], from [place], reports much trouble with abdominal wound while in [place].  7/6/17-- Patient scheduled for surgical drainage of wound Tuesday 7/11/17DB  7/19/17-- Patient post op clinic visit.  8/16/17: CT of abdomen and pelvis done 8/9/17 due to suspected fistula  8/23/17-- U/S of abdomen done today.  12/13/17 Wound vac with 20cc drainage in clinic today.  1/10/18: on PO abx  1/24/18: Fistulagram ordered per Dr. Horton. Patient still on PO abx  02/21/18 Patient is not on antibiotics at this time. BS fasting 135 per patient report this am.  03/07/18 Culture of Anterior Abdominal Wound is positive. No antibiotics at this time.  fasting per patient report.    03/21/18 Patient c/o nausea along with abdominal tenderness near abd midline wound. Patient states that pain level is 6 and that she passed two sero-sanguineous clots from wound. Patient is afebrile this am.  3/28/18: patient continue antibiotics and reports that pain is less than last weeks clinic visit. Drainage has decreased as well  04/04/18 Patient states that abdominal pain is "pressure" sensation and that when she pushes down on her abdomen on either side of abdominal wound she feels like she has to urinate. Patient reports pain level of 3 this am.  fasting this am per patient report.   5/2/18: Patient had Abdomina Toribio today before wound care. She had discontinued Bactrim PO per Dr. Horton's recommendation and culture results and is now taking Amoxicillin PO  6/20/18: Pt reports itching to wound and periwound   6/27/18: patient reports no new complaints with regards to her wound or abdomen, wound " continues to decrease in size and drainage  8/1/18: Pt reports increased pain to abdomen with nausea and emesis since thursday 7/26/18, denies any fevers, patient did no go to ER as instructed by home health nurse on Sat. 7/28/18.  8/29/18: Patient admitted to hospital 8/2 for abdominal wound complications. Surgery for abdominal abscess per Dr. Horton on 8/3. Patient placed on IV antibiotics and discharged home on 8/18 with [facility] and PICC line to [facility]. Patient currently on IV ertapenem. IV medication sent to patients home per [facility].   9/5/18: IV Ertapenem to continue 1 week. Culture sent to lab. [facility] sent orders. PICC line intact to [facility].  9/12/18 Antibiotics completed. Culture results pending. 1 deep stitch remaining.  9/12/18: Culture positive for E. Coli, patient to continue Ertapenem IV antibiotics x 2weeks. [facility]  9/26/18: F/U abdominal wound, wound continues to improve. Patient will complete IV antibiotics today  10/3/18: patient c/o diarrhea for 2 days and nausea with some vomiting. Labs and stool culture ordered. IV antibiotics discontinued today  10/10/2018 patient will start on IV antibiotic Invaz IV once a day, pending PICC line placement, order placed today  for PICC per Dr. Horton.  10/24/18: patient on IV antibiotics, tolerating well. Continue for 1 week  11/7/2018: IV Antibiotic discontinued.  11/8/2018: PICC line to left upper arm discontinued by Home Health.  11/21/2018 F/u for abdominal wall fistula , c/o nausea  No vomiting , no fever            Review of Systems   Constitutional: Negative.    HENT: Negative.    Eyes: Negative.    Respiratory: Negative.    Cardiovascular: Negative.    Gastrointestinal: Positive for abdominal pain.   Genitourinary: Negative.    Musculoskeletal: Negative.    Skin: Positive for color change and wound.   Neurological: Negative.    Psychiatric/Behavioral: Negative.          Objective:        Physical Exam   Constitutional:  She is oriented to person, place, and time. She appears well-developed and well-nourished.   HENT:   Head: Normocephalic.   Eyes: Conjunctivae and EOM are normal. Pupils are equal, round, and reactive to light.   Neck: Normal range of motion. Neck supple.   Cardiovascular: Normal rate, regular rhythm, normal heart sounds and intact distal pulses.   Pulmonary/Chest: Effort normal and breath sounds normal.   Abdominal: Soft. Bowel sounds are normal.       Musculoskeletal: Normal range of motion.   Neurological: She is alert and oriented to person, place, and time. She has normal reflexes.   Skin: Skin is warm and dry.       Vitals:    11/28/18 0838   BP: (!) 162/82   Pulse: 86   Resp: 20   Temp: 98.4 °F (36.9 °C)       Assessment:           ICD-10-CM ICD-9-CM   1. Fistula of intestine, excluding rectum and anus K63.2 569.81   2. S/P exploratory laparotomy Z98.890 V45.89   3. Colonic fistula K63.2 569.81   4. 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, unspecified whether long term insulin use E11.622 250.80    L98.499 707.9   5. Essential hypertension I10 401.9   6. 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, with long-term current use of insulin E11.622 250.80    Z79.4 V58.67            Wound 10/31/18 0836 Abscess medial Abdomen #1 (Active)   10/31/18 0836    Pre-existing: Yes   Primary Wound Type: Abscess   Side:    Orientation: medial   Location: Abdomen   Wound/PI Number (optional): #1   Ankle-Brachial Index:    Pulses:    Removal Indication and Assessment:    Wound Outcome:    (Retired) Wound Type:    (Retired) Wound Length (cm):    (Retired) Wound Width (cm):    (Retired) Depth (cm):    Wound Description (Comments):    Removal Indications:    Wound WDL ex 11/28/2018  8:00 AM   Dressing Appearance Open to air 11/28/2018  8:00 AM   Drainage Amount Small 11/28/2018  8:00 AM   Drainage Characteristics/Odor Serosanguineous 11/28/2018  8:00 AM   Appearance Pink;Red 11/28/2018  8:00 AM   Tissue loss description  Full thickness 11/28/2018  8:00 AM   Red (%), Wound Tissue Color 50 % 11/28/2018  8:00 AM   Yellow (%), Wound Tissue Color 50 % 11/28/2018  8:00 AM   Periwound Area Ecchymotic 11/28/2018  8:00 AM   Wound Edges Irregular 11/28/2018  8:00 AM   Wound Length (cm) 0.3 cm 11/28/2018  8:00 AM   Wound Width (cm) 0.3 cm 11/28/2018  8:00 AM   Wound Depth (cm) 3 cm 11/28/2018  8:00 AM   Wound Volume (cm^3) 0.27 cm^3 11/28/2018  8:00 AM   Wound Surface Area (cm^2) 0.09 cm^2 11/28/2018  8:00 AM   Care Cleansed with:;Sterile normal saline 11/28/2018  8:00 AM   Dressing Removed 11/28/2018  8:00 AM   Periwound Care Absorptive dressing applied 11/28/2018  8:00 AM         Wound #1 Anterior Abdomen - midline  Other Orders  Dressings & Wound Care  Cleanse wound with Normal Saline. - Cavion or benzoin to periwound   betamethasone periwoind prn itching.  Antimicrobial Ointment/Cream. - Gentamycin to wound bed.  Other: - Apply iodoform gauze over wound, cover with aquacel extra or 4x4 gauze, secure with 5x5 aqacel border and mefix tape.   Do not probe depth of wound  Change dressing every: - monday and fri, per HH, Wed in clinic    Plan:            Surgery next week with Dr. Horton     Follow-up in about 1 week (around 12/5/2018).

## 2018-12-04 ENCOUNTER — HOSPITAL ENCOUNTER (OUTPATIENT)
Dept: RADIOLOGY | Facility: HOSPITAL | Age: 68
Discharge: HOME | End: 2018-12-04
Attending: SURGERY
Payer: MEDICARE

## 2018-12-04 DIAGNOSIS — R10.84 GENERALIZED ABDOMINAL PAIN: ICD-10-CM

## 2018-12-04 PROCEDURE — 74178 CT ABD&PLV WO CNTR FLWD CNTR: CPT | Mod: TC

## 2018-12-04 PROCEDURE — 25500020 PHARM REV CODE 255: Performed by: SURGERY

## 2018-12-04 PROCEDURE — 74178 CT ABD&PLV WO CNTR FLWD CNTR: CPT | Mod: 26,,, | Performed by: RADIOLOGY

## 2018-12-04 RX ADMIN — IOHEXOL 30 ML: 350 INJECTION, SOLUTION INTRAVENOUS at 08:12

## 2018-12-04 RX ADMIN — IOHEXOL 50 ML: 350 INJECTION, SOLUTION INTRAVENOUS at 09:12

## 2018-12-04 RX ADMIN — IOHEXOL 75 ML: 350 INJECTION, SOLUTION INTRAVENOUS at 09:12

## 2018-12-05 ENCOUNTER — HOSPITAL ENCOUNTER (OUTPATIENT)
Dept: WOUND CARE | Facility: HOSPITAL | Age: 68
Discharge: HOME | End: 2018-12-05
Attending: SURGERY
Payer: MEDICARE

## 2018-12-05 VITALS
RESPIRATION RATE: 20 BRPM | HEART RATE: 86 BPM | BODY MASS INDEX: 29.44 KG/M2 | DIASTOLIC BLOOD PRESSURE: 66 MMHG | HEIGHT: 62 IN | SYSTOLIC BLOOD PRESSURE: 128 MMHG | WEIGHT: 160 LBS | TEMPERATURE: 98 F

## 2018-12-05 DIAGNOSIS — K63.2 FISTULA OF INTESTINE, EXCLUDING RECTUM AND ANUS: Primary | ICD-10-CM

## 2018-12-05 DIAGNOSIS — Z98.890 S/P EXPLORATORY LAPAROTOMY: ICD-10-CM

## 2018-12-05 DIAGNOSIS — K63.2 COLOCUTANEOUS FISTULA: ICD-10-CM

## 2018-12-05 DIAGNOSIS — K63.2 COLONIC FISTULA: ICD-10-CM

## 2018-12-05 DIAGNOSIS — L02.211 ABSCESS OF ABDOMINAL WALL: ICD-10-CM

## 2018-12-05 PROCEDURE — 99212 OFFICE O/P EST SF 10 MIN: CPT

## 2018-12-05 RX ORDER — DICYCLOMINE HYDROCHLORIDE 10 MG/1
10 CAPSULE ORAL
Qty: 120 CAPSULE | Refills: 0 | Status: SHIPPED | OUTPATIENT
Start: 2018-12-05 | End: 2019-02-11 | Stop reason: SDUPTHER

## 2018-12-05 NOTE — PROGRESS NOTES
"Subjective:       Patient ID: Azucena Morrison is a 68 y.o. female.    Chief Complaint: Non-healing Wound    6/29/17: Pt re-admit for distal abdominal wound. Pt denies any fevers or chills but states she feels nauseous at times. [place]ed to [place], from [place], reports much trouble with abdominal wound while in [place].  7/6/17-- Patient scheduled for surgical drainage of wound Tuesday 7/11/17DB  7/19/17-- Patient post op clinic visit.  8/16/17: CT of abdomen and pelvis done 8/9/17 due to suspected fistula  8/23/17-- U/S of abdomen done today.  12/13/17 Wound vac with 20cc drainage in clinic today.  1/10/18: on PO abx  1/24/18: Fistulagram ordered per Dr. Horton. Patient still on PO abx  02/21/18 Patient is not on antibiotics at this time. BS fasting 135 per patient report this am.  03/07/18 Culture of Anterior Abdominal Wound is positive. No antibiotics at this time.  fasting per patient report.    03/21/18 Patient c/o nausea along with abdominal tenderness near abd midline wound. Patient states that pain level is 6 and that she passed two sero-sanguineous clots from wound. Patient is afebrile this am.  3/28/18: patient continue antibiotics and reports that pain is less than last weeks clinic visit. Drainage has decreased as well  04/04/18 Patient states that abdominal pain is "pressure" sensation and that when she pushes down on her abdomen on either side of abdominal wound she feels like she has to urinate. Patient reports pain level of 3 this am.  fasting this am per patient report.   5/2/18: Patient had Abdomina Toribio today before wound care. She had discontinued Bactrim PO per Dr. Horton's recommendation and culture results and is now taking Amoxicillin PO  6/20/18: Pt reports itching to wound and periwound   6/27/18: patient reports no new complaints with regards to her wound or abdomen, wound continues to decrease in size and drainage  8/1/18: Pt reports increased pain to abdomen with " nausea and emesis since thursday 7/26/18, denies any fevers, patient did no go to ER as instructed by home health nurse on Sat. 7/28/18.  8/29/18: Patient admitted to hospital 8/2 for abdominal wound complications. Surgery for abdominal abscess per Dr. Horton on 8/3. Patient placed on IV antibiotics and discharged home on 8/18 with [facility] and P[facility] line to [facility]. Patient currently on IV ertapenem. IV medication sent to patients home per Replaced by [facility].   9/5/18: IV Ertapenem to continue 1 week. Culture sent to lab. [facility] sent orders. PICC line intact to [facility].  9/12/18 Antibiotics completed. Culture results pending. 1 deep stitch remaining.  9/12/18: Culture positive for E. Coli, patient to continue Ertapenem IV antibiotics x 2weeks. [facility] notified  9/26/18: F/U abdominal wound, wound continues to improve. Patient will complete IV antibiotics today  10/3/18: patient c/o diarrhea for 2 days and nausea with some vomiting. Labs and stool culture ordered. IV antibiotics discontinued today  10/10/2018 patient will start on IV antibiotic Invaz IV once a day, pending PICC line placement, order placed today  for PICC per Dr. Horton.  10/24/18: patient on IV antibiotics, tolerating well. Continue for 1 week  11/7/2018: IV Antibiotic discontinued.  11/8/2018: PICC line to left upper arm discontinued by Home Health.  11/21/2018 F/u for abdominal wall fistula , c/o nausea  No vomiting , no fever  12/5/2018: F/u for abdominal wall fisula, c/o gas, Dr. Horton will order Bentyl. Surgery schedule for January 2019.          Review of Systems   Constitutional: Negative.    HENT: Negative.    Eyes: Negative.    Respiratory: Negative.    Cardiovascular: Negative.    Gastrointestinal: Negative.    Genitourinary: Negative.    Musculoskeletal: Negative.    Skin: Negative.    Neurological: Negative.    Psychiatric/Behavioral: Negative.        Objective:      Physical Exam   Constitutional: She is oriented to  person, place, and time. She appears well-developed and well-nourished.   HENT:   Head: Normocephalic.   Eyes: Conjunctivae and EOM are normal. Pupils are equal, round, and reactive to light.   Neck: Normal range of motion. Neck supple.   Cardiovascular: Normal rate, regular rhythm, normal heart sounds and intact distal pulses.   Pulmonary/Chest: Effort normal and breath sounds normal.   Abdominal: Soft. Bowel sounds are normal.       Musculoskeletal: Normal range of motion.   Neurological: She is alert and oriented to person, place, and time. She has normal reflexes.   Skin: Skin is warm and dry.       Assessment:       1. Fistula of intestine, excluding rectum and anus    2. S/P exploratory laparotomy    3. Abscess of abdominal wall    4. Colonic fistula    5. Colocutaneous fistula           Wound 10/31/18 0836 Abscess medial Abdomen #1 (Active)   10/31/18 0836    Pre-existing: Yes   Primary Wound Type: Abscess   Side:    Orientation: medial   Location: Abdomen   Wound/PI Number (optional): #1   Ankle-Brachial Index:    Pulses:    Removal Indication and Assessment:    Wound Outcome:    (Retired) Wound Type:    (Retired) Wound Length (cm):    (Retired) Wound Width (cm):    (Retired) Depth (cm):    Wound Description (Comments):    Removal Indications:    Wound Image   12/5/2018  9:21 AM   Wound WDL ex 12/5/2018  9:21 AM   Dressing Appearance Open to air;Moist drainage 12/5/2018  9:21 AM   Drainage Amount Large 12/5/2018  9:21 AM   Drainage Characteristics/Odor Serosanguineous;Creamy;Tan 12/5/2018  9:21 AM   Appearance Pink;Red 12/5/2018  9:21 AM   Tissue loss description Full thickness 12/5/2018  9:21 AM   Red (%), Wound Tissue Color 50 % 12/5/2018  9:21 AM   Yellow (%), Wound Tissue Color 50 % 12/5/2018  9:21 AM   Periwound Area Ecchymotic 12/5/2018  9:21 AM   Wound Edges Irregular 12/5/2018  9:21 AM   Wound Length (cm) 0.2 cm 12/5/2018  9:21 AM   Wound Width (cm) 0.2 cm 12/5/2018  9:21 AM   Wound Depth (cm) 2.8 cm  12/5/2018  9:21 AM   Wound Volume (cm^3) 0.11 cm^3 12/5/2018  9:21 AM   Wound Surface Area (cm^2) 0.04 cm^2 12/5/2018  9:21 AM   Care Cleansed with:;Sterile normal saline 12/5/2018  9:21 AM   Dressing Removed;Applied;Other (see comments) 12/5/2018  9:21 AM   Periwound Care Absorptive dressing applied 12/5/2018  9:21 AM   Dressing Change Due 12/12/18 12/5/2018  9:21 AM         Wound #1 Anterior Abdomen - midline  Other Orders  Dressings & Wound Care  Cleanse wound with Normal Saline. - Cavion or benzoin to periwound   betamethasone periwoind prn itching.  Antimicrobial Ointment/Cream. - Gentamycin to wound bed.  Other: - Apply iodoform gauze over wound, cover with aquacel extra or 4x4 gauze, secure with 5x5 aqacel border and mefix tape.   Do not probe depth of wound    Plan:            Surgery with Dr. Horton schedule for January 2019    Follow-up in about 1 week (around 12/12/2018).

## 2018-12-12 ENCOUNTER — HOSPITAL ENCOUNTER (OUTPATIENT)
Dept: RADIOLOGY | Facility: HOSPITAL | Age: 68
Discharge: HOME | End: 2018-12-12
Attending: SURGERY
Payer: MEDICARE

## 2018-12-12 ENCOUNTER — HOSPITAL ENCOUNTER (OUTPATIENT)
Dept: WOUND CARE | Facility: HOSPITAL | Age: 68
Discharge: HOME | End: 2018-12-12
Attending: SURGERY
Payer: MEDICARE

## 2018-12-12 VITALS
DIASTOLIC BLOOD PRESSURE: 78 MMHG | TEMPERATURE: 98 F | RESPIRATION RATE: 18 BRPM | SYSTOLIC BLOOD PRESSURE: 157 MMHG | BODY MASS INDEX: 29.44 KG/M2 | HEIGHT: 62 IN | HEART RATE: 98 BPM | WEIGHT: 160 LBS

## 2018-12-12 DIAGNOSIS — R10.84 GENERALIZED ABDOMINAL PAIN: ICD-10-CM

## 2018-12-12 DIAGNOSIS — Z79.4 TYPE 2 DIABETES MELLITUS WITHOUT COMPLICATION, WITH LONG-TERM CURRENT USE OF INSULIN: Primary | ICD-10-CM

## 2018-12-12 DIAGNOSIS — K63.2 COLONIC FISTULA: ICD-10-CM

## 2018-12-12 DIAGNOSIS — A49.8 INFECTION DUE TO ESBL-PRODUCING ESCHERICHIA COLI: ICD-10-CM

## 2018-12-12 DIAGNOSIS — K63.2 COLOCUTANEOUS FISTULA: ICD-10-CM

## 2018-12-12 DIAGNOSIS — E11.9 TYPE 2 DIABETES MELLITUS WITHOUT COMPLICATION, WITH LONG-TERM CURRENT USE OF INSULIN: Primary | ICD-10-CM

## 2018-12-12 DIAGNOSIS — T14.8XXA DRAINAGE FROM WOUND: ICD-10-CM

## 2018-12-12 DIAGNOSIS — R11.0 NAUSEA: ICD-10-CM

## 2018-12-12 DIAGNOSIS — Z93.3 STATUS POST HARTMANN'S PROCEDURE: ICD-10-CM

## 2018-12-12 DIAGNOSIS — E11.628 DIABETES WITH SKIN COMPLICATION: ICD-10-CM

## 2018-12-12 DIAGNOSIS — Z16.12 INFECTION DUE TO ESBL-PRODUCING ESCHERICHIA COLI: ICD-10-CM

## 2018-12-12 DIAGNOSIS — L02.211 ABDOMINAL WALL ABSCESS: ICD-10-CM

## 2018-12-12 DIAGNOSIS — Z98.890 S/P EXPLORATORY LAPAROTOMY: ICD-10-CM

## 2018-12-12 DIAGNOSIS — L02.211 ABSCESS OF ABDOMINAL WALL: ICD-10-CM

## 2018-12-12 PROCEDURE — 74018 RADEX ABDOMEN 1 VIEW: CPT | Mod: TC,FY

## 2018-12-12 PROCEDURE — 74018 RADEX ABDOMEN 1 VIEW: CPT | Mod: 26,,, | Performed by: RADIOLOGY

## 2018-12-12 PROCEDURE — 99212 OFFICE O/P EST SF 10 MIN: CPT | Mod: 25

## 2018-12-12 NOTE — PROGRESS NOTES
"Subjective:       Patient ID: Azucena Morrison is a 68 y.o. female.    Chief Complaint: No chief complaint on file.    6/29/17: Pt re-admit for distal abdominal wound. Pt denies any fevers or chills but states she feels nauseous at times. Returned to [place] June [place], from [place], reports much trouble with abdominal wound while in [place].  7/6/17-- Patient scheduled for surgical drainage of wound Tuesday 7/11/17DB  7/19/17-- Patient post op clinic visit.  8/16/17: CT of abdomen and pelvis done 8/9/17 due to suspected fistula  8/23/17-- U/S of abdomen done today.  12/13/17 Wound vac with 20cc drainage in clinic today.  1/10/18: on PO abx  1/24/18: Fistulagram ordered per Dr. Horton. Patient still on PO abx  02/21/18 Patient is not on antibiotics at this time. BS fasting 135 per patient report this am.  03/07/18 Culture of Anterior Abdominal Wound is positive. No antibiotics at this time.  fasting per patient report.    03/21/18 Patient c/o nausea along with abdominal tenderness near abd midline wound. Patient states that pain level is 6 and that she passed two sero-sanguineous clots from wound. Patient is afebrile this am.  3/28/18: patient continue antibiotics and reports that pain is less than last weeks clinic visit. Drainage has decreased as well  04/04/18 Patient states that abdominal pain is "pressure" sensation and that when she pushes down on her abdomen on either side of abdominal wound she feels like she has to urinate. Patient reports pain level of 3 this am.  fasting this am per patient report.   5/2/18: Patient had Abdomina Toribio today before wound care. She had discontinued Bactrim PO per Dr. Horton's recommendation and culture results and is now taking Amoxicillin PO  6/20/18: Pt reports itching to wound and periwound   6/27/18: patient reports no new complaints with regards to her wound or abdomen, wound continues to decrease in size and drainage  8/1/18: Pt reports increased pain to " abdomen with nausea and emesis since thursday 7/26/18, denies any fevers, patient did no go to ER as instructed by home health nurse on Sat. 7/28/18.  8/29/18: Patient admitted to hospital 8/2 for abdominal wound complications. Surgery for abdominal abscess per Dr. Horton on 8/3. Patient placed on IV antibiotics and discharged home on 8/18 with [facility]  and PICC line to [facility]. Patient currently on IV ertapenem. IV medication sent to patients home per [facility].   9/5/18: IV Ertapenem to continue 1 week. Culture sent to lab. [facility]  sent orders. PICC line intact to E.  9/12/18 Antibiotics completed. Culture results pending. 1 deep stitch remaining.  9/12/18: Culture positive for E. Coli, patient to continue Ertapenem IV antibiotics x 2weeks. [facility] notified  9/26/18: F/U abdominal wound, wound continues to improve. Patient will complete IV antibiotics today  10/3/18: patient c/o diarrhea for 2 days and nausea with some vomiting. Labs and stool culture ordered. IV antibiotics discontinued today  10/10/2018 patient will start on IV antibiotic Invaz IV once a day, pending PICC line placement, order placed today  for PICC per Dr. Horton.  10/24/18: patient on IV antibiotics, tolerating well. Continue for 1 week  11/7/2018: IV Antibiotic discontinued.  11/8/2018: PICC line to left upper arm discontinued by Home Health.  11/21/2018 F/u for abdominal wall fistula , c/o nausea  No vomiting , no fever  12/5/2018: F/u for abdominal wall fisula, c/o gas, Dr. Horton will order Bentyl. Surgery schedule for January 2019.  12/12/2018: F/u for abdominal wall fistula, c/o abdominal pain. Dr. Horton ordered Norco 5/325mg tab 1 PO every 4 hours as needed for pain and referred patient for x-ray of abdomen after clinic today.        Review of Systems   Constitutional: Negative.    HENT: Negative.    Eyes: Negative.    Respiratory: Negative.    Cardiovascular: Negative.    Gastrointestinal: Positive for  abdominal pain.   Genitourinary: Negative.    Musculoskeletal: Negative.    Skin: Positive for color change and wound.   Neurological: Negative.    Psychiatric/Behavioral: Negative.        Objective:      Physical Exam   Constitutional: She is oriented to person, place, and time. She appears well-developed and well-nourished.   HENT:   Head: Normocephalic.   Eyes: Conjunctivae and EOM are normal. Pupils are equal, round, and reactive to light.   Neck: Normal range of motion. Neck supple.   Cardiovascular: Normal rate, regular rhythm, normal heart sounds and intact distal pulses.   Pulmonary/Chest: Effort normal and breath sounds normal.   Abdominal: Soft. Bowel sounds are normal.   Musculoskeletal: Normal range of motion.   Neurological: She is alert and oriented to person, place, and time. She has normal reflexes.   Skin: Skin is warm and dry.       Assessment:       1. Type 2 diabetes mellitus without complication, with long-term current use of insulin    2. Status post Aiyana's procedure    3. Infection due to ESBL-producing Escherichia coli    4. Colocutaneous fistula    5. Abscess of abdominal wall    6. Nausea    7. Abdominal wall abscess    8. Colonic fistula    9. Generalized abdominal pain    10. Diabetes with skin complication    11. S/P exploratory laparotomy    12. Drainage from wound           Wound 10/31/18 0836 Abscess medial Abdomen #1 (Active)   10/31/18 0836    Pre-existing: Yes   Primary Wound Type: Abscess   Side:    Orientation: medial   Location: Abdomen   Wound/PI Number (optional): #1   Ankle-Brachial Index:    Pulses:    Removal Indication and Assessment:    Wound Outcome:    (Retired) Wound Type:    (Retired) Wound Length (cm):    (Retired) Wound Width (cm):    (Retired) Depth (cm):    Wound Description (Comments):    Removal Indications:    Wound WDL ex 12/12/2018  9:45 AM   Dressing Appearance Moist drainage;Intact 12/12/2018  9:45 AM   Drainage Amount Moderate 12/12/2018  9:45 AM    Drainage Characteristics/Odor Creamy;Serosanguineous 12/12/2018  9:45 AM   Appearance Pink;Red;Yellow 12/12/2018  9:45 AM   Tissue loss description Full thickness 12/12/2018  9:45 AM   Red (%), Wound Tissue Color 50 % 12/12/2018  9:45 AM   Yellow (%), Wound Tissue Color 50 % 12/12/2018  9:45 AM   Periwound Area Moist;[place];Redness 12/12/2018  9:45 AM   Wound Edges Approximated 12/12/2018  9:45 AM   Wound Length (cm) 0.2 cm 12/12/2018  9:45 AM   Wound Width (cm) 0.2 cm 12/12/2018  9:45 AM   Wound Depth (cm) 2.6 cm 12/12/2018  9:45 AM   Wound Volume (cm^3) 0.1 cm^3 12/12/2018  9:45 AM   Wound Surface Area (cm^2) 0.04 cm^2 12/12/2018  9:45 AM   Care Cleansed with:;Sterile normal saline 12/12/2018  9:45 AM   Dressing Gauze;Abd pad;Applied;Removed;Other (see comments) 12/12/2018  9:45 AM   Packing gauze, iodoform 12/12/2018  9:45 AM   Periwound Care Absorptive dressing applied;Topical treatment applied 12/12/2018  9:45 AM   Dressing Change Due 12/14/18 12/12/2018  9:45 AM         Wound #1 Anterior Abdomen - midline  Other Orders  Dressings & Wound Care  Cleanse wound with Normal Saline. - Cavion or benzoin to periwound   betamethasone periwoind prn itching.  Antimicrobial Ointment/Cream. - Gentamycin to wound bed.  Other: - Apply iodoform gauze over wound, cover with aquacel extra or 4x4 gauze, secure with 5x5 aqacel border and mefix tape.   Do not probe depth of wound      Plan:              Follow-up in about 1 week (around 12/19/2018).    Wound less drainage , will check x-ray abdomen , continue present treatment.

## 2018-12-19 ENCOUNTER — HOSPITAL ENCOUNTER (OUTPATIENT)
Dept: WOUND CARE | Facility: HOSPITAL | Age: 68
Discharge: HOME | End: 2018-12-19
Attending: SURGERY
Payer: MEDICARE

## 2018-12-19 VITALS
RESPIRATION RATE: 20 BRPM | TEMPERATURE: 98 F | WEIGHT: 160 LBS | DIASTOLIC BLOOD PRESSURE: 92 MMHG | SYSTOLIC BLOOD PRESSURE: 170 MMHG | BODY MASS INDEX: 29.44 KG/M2 | HEIGHT: 62 IN | HEART RATE: 77 BPM

## 2018-12-19 DIAGNOSIS — Z90.49 HISTORY OF HEMICOLECTOMY: ICD-10-CM

## 2018-12-19 DIAGNOSIS — T14.8XXA DRAINAGE FROM WOUND: ICD-10-CM

## 2018-12-19 DIAGNOSIS — I10 ESSENTIAL HYPERTENSION: ICD-10-CM

## 2018-12-19 DIAGNOSIS — L98.499 TYPE 2 DIABETES MELLITUS WITH OTHER SKIN ULCER, UNSPECIFIED WHETHER LONG TERM INSULIN USE: Primary | ICD-10-CM

## 2018-12-19 DIAGNOSIS — E11.622 TYPE 2 DIABETES MELLITUS WITH OTHER SKIN ULCER, UNSPECIFIED WHETHER LONG TERM INSULIN USE: Primary | ICD-10-CM

## 2018-12-19 DIAGNOSIS — L02.211 ABDOMINAL WALL ABSCESS: ICD-10-CM

## 2018-12-19 PROCEDURE — 99212 OFFICE O/P EST SF 10 MIN: CPT

## 2018-12-19 RX ORDER — ONDANSETRON HYDROCHLORIDE 8 MG/1
8 TABLET, FILM COATED ORAL EVERY 8 HOURS PRN
Qty: 40 TABLET | Refills: 1 | Status: SHIPPED | OUTPATIENT
Start: 2018-12-19 | End: 2019-01-16 | Stop reason: SDUPTHER

## 2018-12-19 NOTE — PROGRESS NOTES
"[facility]                Progress Note    Subjective:       Patient ID: Azucena Morrison is a 68 y.o. female.    Chief Complaint: Non-healing Wound    6/29/17: Pt re-admit for distal abdominal wound. Pt denies any fevers or chills but states she feels nauseous at times. [place]ed to [place], from [place], reports much trouble with abdominal wound while in [place].  7/6/17-- Patient scheduled for surgical drainage of wound Tuesday 7/11/17DB  7/19/17-- Patient post op clinic visit.  8/16/17: CT of abdomen and pelvis done 8/9/17 due to suspected fistula  8/23/17-- U/S of abdomen done today.  12/13/17 Wound vac with 20cc drainage in clinic today.  1/10/18: on PO abx  1/24/18: Fistulagram ordered per Dr. Horton. Patient still on PO abx  02/21/18 Patient is not on antibiotics at this time. BS fasting 135 per patient report this am.  03/07/18 Culture of Anterior Abdominal Wound is positive. No antibiotics at this time.  fasting per patient report.    03/21/18 Patient c/o nausea along with abdominal tenderness near abd midline wound. Patient states that pain level is 6 and that she passed two sero-sanguineous clots from wound. Patient is afebrile this am.  3/28/18: patient continue antibiotics and reports that pain is less than last weeks clinic visit. Drainage has decreased as well  04/04/18 Patient states that abdominal pain is "pressure" sensation and that when she pushes down on her abdomen on either side of abdominal wound she feels like she has to urinate. Patient reports pain level of 3 this am.  fasting this am per patient report.   5/2/18: Patient had Abdomina Toribio today before wound care. She had discontinued Bactrim PO per Dr. Horton's recommendation and culture results and is now taking Amoxicillin PO  6/20/18: Pt reports itching to wound and periwound   6/27/18: patient reports no new complaints with regards to her wound or abdomen, wound " continues to decrease in size and drainage  8/1/18: Pt reports increased pain to abdomen with nausea and emesis since thursday 7/26/18, denies any fevers, patient did no go to ER as instructed by home health nurse on Sat. 7/28/18.  8/29/18: Patient admitted to hospital 8/2 for abdominal wound complications. Surgery for abdominal abscess per Dr. Horton on 8/3. Patient placed on IV antibiotics and discharged home on 8/18 with [facility]  and PICC line to [facility]. Patient currently on IV ertapenem. IV medication sent to patients home per [facility].   9/5/18: IV Ertapenem to continue 1 week. Culture sent to lab. [facility]  sent orders. PICC line intact to [facility].  9/12/18 Antibiotics completed. Culture results pending. 1 deep stitch remaining.  9/12/18: Culture positive for E. Coli, patient to continue Ertapenem IV antibiotics x 2weeks. [facility] notified  9/26/18: F/U abdominal wound, wound continues to improve. Patient will complete IV antibiotics today  10/3/18: patient c/o diarrhea for 2 days and nausea with some vomiting. Labs and stool culture ordered. IV antibiotics discontinued today  10/10/2018 patient will start on IV antibiotic Invaz IV once a day, pending PICC line placement, order placed today  for PICC per Dr. Horton.  10/24/18: patient on IV antibiotics, tolerating well. Continue for 1 week  11/7/2018: IV Antibiotic discontinued.  11/8/2018: PICC line to left upper arm discontinued by Home Health.  11/21/2018 F/u for abdominal wall fistula , c/o nausea  No vomiting , no fever  12/5/2018: F/u for abdominal wall fisula, c/o gas, Dr. Horton will order Bentyl. Surgery schedule for January 2019.  12/12/2018: F/u for abdominal wall fistula, c/o abdominal pain. Dr. Horton ordered Norco 5/325mg tab 1 PO every 4 hours as needed for pain and referred patient for x-ray of abdomen after clinic today.  12/19/2018: F/u for abdominal wall fisula, c/o nausea, Dr. Horton re ordered Ondansetron (Zofran) 8mg  one tab by mouth every eight hours as needed for nausea. No changes in wound condition from last visit noted in clinic today.          Review of Systems   Constitutional: Negative.    HENT: Negative.    Eyes: Negative.    Respiratory: Negative.    Cardiovascular: Negative.    Gastrointestinal: Negative.    Genitourinary: Negative.    Musculoskeletal: Negative.    Skin: Negative.    Neurological: Negative.    Psychiatric/Behavioral: Negative.          Objective:        Physical Exam   Constitutional: She is oriented to person, place, and time. She appears well-developed and well-nourished.   HENT:   Head: Normocephalic.   Eyes: Conjunctivae and EOM are normal. Pupils are equal, round, and reactive to light.   Neck: Normal range of motion. Neck supple.   Cardiovascular: Normal rate, regular rhythm, normal heart sounds and intact distal pulses.   Pulmonary/Chest: Effort normal and breath sounds normal.   Abdominal: Soft. Bowel sounds are normal.   Musculoskeletal: Normal range of motion.   Neurological: She is alert and oriented to person, place, and time. She has normal reflexes.   Skin: Skin is warm and dry.       Vitals:    12/19/18 0851   BP: (!) 170/92   Pulse: 77   Resp: 20   Temp: 98.3 °F (36.8 °C)       Assessment:           ICD-10-CM ICD-9-CM   1. 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, unspecified whether long term insulin use E11.622 250.80    L98.499 707.9   2. Essential hypertension I10 401.9   3. History of hemicolectomy Z90.49 V15.29   4. Abdominal wall abscess L02.211 682.2            Wound 10/31/18 0836 Abscess medial Abdomen #1 (Active)   10/31/18 0836    Pre-existing: Yes   Primary Wound Type: Abscess   Side:    Orientation: medial   Location: Abdomen   Wound/PI Number (optional): #1   Ankle-Brachial Index:    Pulses:    Removal Indication and Assessment:    Wound Outcome:    (Retired) Wound Type:    (Retired) Wound Length (cm):    (Retired) Wound Width (cm):    (Retired) Depth (cm):    Wound  Description (Comments):    Removal Indications:    Wound WDL ex 12/19/2018  8:00 AM   Dressing Appearance Moist drainage 12/19/2018  8:00 AM   Drainage Amount Moderate 12/19/2018  8:00 AM   Drainage Characteristics/Odor Serosanguineous 12/19/2018  8:00 AM   Appearance Pink;Red 12/19/2018  8:00 AM   Tissue loss description Full thickness 12/19/2018  8:00 AM   Red (%), Wound Tissue Color 50 % 12/19/2018  8:00 AM   Yellow (%), Wound Tissue Color 50 % 12/19/2018  8:00 AM   Periwound Area Moist;[place] 12/19/2018  8:00 AM   Wound Edges Approximated 12/19/2018  8:00 AM   Wound Length (cm) 0.2 cm 12/19/2018  8:00 AM   Wound Width (cm) 0.2 cm 12/19/2018  8:00 AM   Wound Depth (cm) 2.6 cm 12/19/2018  8:00 AM   Wound Volume (cm^3) 0.1 cm^3 12/19/2018  8:00 AM   Wound Surface Area (cm^2) 0.04 cm^2 12/19/2018  8:00 AM   Care Cleansed with:;Sterile normal saline 12/19/2018  8:00 AM   Dressing Gauze;Abd pad;Applied 12/19/2018  8:00 AM   Packing gauze, iodoform 12/19/2018  8:00 AM   Periwound Care Absorptive dressing applied 12/19/2018  8:00 AM   Dressing Change Due 12/21/18 12/19/2018  8:00 AM         Wound #1 Anterior Abdomen - midline  Other Orders  Dressings & Wound Care  Cleanse wound with Normal Saline. - Cavion or benzoin to periwound   betamethasone periwoind prn itching.  Antimicrobial Ointment/Cream. - Gentamycin to wound bed.  Other: - Apply iodoform gauze over wound, cover with aquacel extra or 4x4 gauze, secure with 5x5 aqacel border and mefix tape.   Do not probe depth of wound    Plan:               Follow-up in about 1 week (around 12/26/2018).

## 2018-12-22 NOTE — PLAN OF CARE
Problem: Patient Care Overview  Goal: Plan of Care Review  Outcome: Ongoing (interventions implemented as appropriate)  Pt on RA with documented sats. No resp distress noted. Will continue to monitor.        No

## 2018-12-26 ENCOUNTER — HOSPITAL ENCOUNTER (OUTPATIENT)
Dept: WOUND CARE | Facility: HOSPITAL | Age: 68
Discharge: HOME | End: 2018-12-26
Attending: SURGERY
Payer: MEDICARE

## 2018-12-26 VITALS
SYSTOLIC BLOOD PRESSURE: 162 MMHG | WEIGHT: 160 LBS | HEIGHT: 62 IN | RESPIRATION RATE: 20 BRPM | DIASTOLIC BLOOD PRESSURE: 80 MMHG | TEMPERATURE: 98 F | BODY MASS INDEX: 29.44 KG/M2 | HEART RATE: 75 BPM

## 2018-12-26 DIAGNOSIS — L02.211 ABDOMINAL WALL ABSCESS: Primary | ICD-10-CM

## 2018-12-26 DIAGNOSIS — Z98.890 S/P EXPLORATORY LAPAROTOMY: ICD-10-CM

## 2018-12-26 DIAGNOSIS — Z79.4 TYPE 2 DIABETES MELLITUS WITHOUT COMPLICATION, WITH LONG-TERM CURRENT USE OF INSULIN: ICD-10-CM

## 2018-12-26 DIAGNOSIS — E11.9 TYPE 2 DIABETES MELLITUS WITHOUT COMPLICATION, WITH LONG-TERM CURRENT USE OF INSULIN: ICD-10-CM

## 2018-12-26 DIAGNOSIS — T14.8XXA DRAINAGE FROM WOUND: ICD-10-CM

## 2018-12-26 PROCEDURE — 99212 OFFICE O/P EST SF 10 MIN: CPT

## 2018-12-26 NOTE — PROGRESS NOTES
"Subjective:       Patient ID: Azucena Morrison is a 68 y.o. female.    Chief Complaint: Non-healing Wound    6/29/17: Pt re-admit for distal abdominal wound. Pt denies any fevers or chills but states she feels nauseous at times. [place]ed to [place], from [place], reports much trouble with abdominal wound while in [place].  7/6/17-- Patient scheduled for surgical drainage of wound Tuesday 7/11/17DB  7/19/17-- Patient post op clinic visit.  8/16/17: CT of abdomen and pelvis done 8/9/17 due to suspected fistula  8/23/17-- U/S of abdomen done today.  12/13/17 Wound vac with 20cc drainage in clinic today.  1/10/18: on PO abx  1/24/18: Fistulagram ordered per Dr. Horton. Patient still on PO abx  02/21/18 Patient is not on antibiotics at this time. BS fasting 135 per patient report this am.  03/07/18 Culture of Anterior Abdominal Wound is positive. No antibiotics at this time.  fasting per patient report.    03/21/18 Patient c/o nausea along with abdominal tenderness near abd midline wound. Patient states that pain level is 6 and that she passed two sero-sanguineous clots from wound. Patient is afebrile this am.  3/28/18: patient continue antibiotics and reports that pain is less than last weeks clinic visit. Drainage has decreased as well  04/04/18 Patient states that abdominal pain is "pressure" sensation and that when she pushes down on her abdomen on either side of abdominal wound she feels like she has to urinate. Patient reports pain level of 3 this am.  fasting this am per patient report.   5/2/18: Patient had Abdomina Toribio today before wound care. She had discontinued Bactrim PO per Dr. Horton's recommendation and culture results and is now taking Amoxicillin PO  6/20/18: Pt reports itching to wound and periwound   6/27/18: patient reports no new complaints with regards to her wound or abdomen, wound continues to decrease in size and drainage  8/1/18: Pt reports increased pain to abdomen with " nausea and emesis since thursday 7/26/18, denies any fevers, patient did no go to ER as instructed by home health nurse on Sat. 7/28/18.  8/29/18: Patient admitted to hospital 8/2 for abdominal wound complications. Surgery for abdominal abscess per Dr. Horton on 8/3. Patient placed on IV antibiotics and discharged home on 8/18 with [facility]  and PICC line to [facility]. Patient currently on IV ertapenem. IV medication sent to patients home per [facility].   9/5/18: IV Ertapenem to continue 1 week. Culture sent to lab. [facility]  sent orders. PICC line intact to E.  9/12/18 Antibiotics completed. Culture results pending. 1 deep stitch remaining.  9/12/18: Culture positive for E. Coli, patient to continue Ertapenem IV antibiotics x 2weeks. [facility] notified  9/26/18: F/U abdominal wound, wound continues to improve. Patient will complete IV antibiotics today  10/3/18: patient c/o diarrhea for 2 days and nausea with some vomiting. Labs and stool culture ordered. IV antibiotics discontinued today  10/10/2018 patient will start on IV antibiotic Invaz IV once a day, pending PICC line placement, order placed today  for PICC per Dr. Horton.  10/24/18: patient on IV antibiotics, tolerating well. Continue for 1 week  11/7/2018: IV Antibiotic discontinued.  11/8/2018: PICC line to left upper arm discontinued by Home Health.  11/21/2018 F/u for abdominal wall fistula , c/o nausea  No vomiting , no fever  12/5/2018: F/u for abdominal wall fisula, c/o gas, Dr. Horton will order Bentyl. Surgery schedule for January 2019.  12/12/2018: F/u for abdominal wall fistula, c/o abdominal pain. Dr. Horton ordered Norco 5/325mg tab 1 PO every 4 hours as needed for pain and referred patient for x-ray of abdomen after clinic today.  12/19/2018: F/u for abdominal wall fisula, c/o nausea, Dr. Horton re ordered Ondansetron (Zofran) 8mg one tab by mouth every eight hours as needed for nausea. No changes in wound condition from  last visit noted in clinic today.  12/26/2018: F/u abdominal wall fistula drainage, no c/o at this time. Denies any abdominal pain or nausea. Dr. Horton recommends surgery first week of January 2019 for abdominal fistula treatment and possible colostomy placement, patient agree.      Review of Systems    Objective:      Physical Exam    Assessment:       1. Abdominal wall abscess    2. Drainage from wound           Wound 10/31/18 0836 Abscess medial Abdomen #1 (Active)   10/31/18 0836    Pre-existing: Yes   Primary Wound Type: Abscess   Side:    Orientation: medial   Location: Abdomen   Wound/PI Number (optional): #1   Ankle-Brachial Index:    Pulses:    Removal Indication and Assessment:    Wound Outcome:    (Retired) Wound Type:    (Retired) Wound Length (cm):    (Retired) Wound Width (cm):    (Retired) Depth (cm):    Wound Description (Comments):    Removal Indications:    Wound WDL ex 12/26/2018  8:44 AM   Dressing Appearance Moist drainage 12/26/2018  8:44 AM   Drainage Amount Moderate 12/26/2018  8:44 AM   Drainage Characteristics/Odor Serosanguineous 12/26/2018  8:44 AM   Appearance Pink;Red;Moist;[place] 12/26/2018  8:44 AM   Tissue loss description Full thickness 12/26/2018  8:44 AM   Red (%), Wound Tissue Color 50 % 12/26/2018  8:44 AM   Yellow (%), Wound Tissue Color 50 % 12/26/2018  8:44 AM   Periwound Area Moist;[place] 12/26/2018  8:44 AM   Wound Edges Approximated 12/26/2018  8:44 AM   Wound Length (cm) 0.2 cm 12/26/2018  8:44 AM   Wound Width (cm) 0.2 cm 12/26/2018  8:44 AM   Wound Depth (cm) 2.6 cm 12/26/2018  8:44 AM   Wound Volume (cm^3) 0.1 cm^3 12/26/2018  8:44 AM   Wound Surface Area (cm^2) 0.04 cm^2 12/26/2018  8:44 AM   Care Cleansed with:;Sterile normal saline 12/26/2018  8:44 AM   Dressing Removed;Gauze;Abd pad;Applied 12/26/2018  8:44 AM   Packing gauze, iodoform 12/26/2018  8:44 AM   Periwound Care Absorptive dressing applied 12/26/2018  8:44 AM   Dressing Change Due 01/02/19 12/26/2018  8:44  AM         Wound #1 Anterior Abdomen - midline  Other Orders  Dressings & Wound Care  Cleanse wound with Normal Saline. - Cavion or benzoin to periwound   betamethasone periwoind prn itching.  Antimicrobial Ointment/Cream. - Gentamycin to wound bed.  Other: - Apply iodoform gauze over wound, cover with aquacel extra or 4x4 gauze, secure with 5x5 aqacel border and mefix tape.   Do not probe depth of wound      Plan:                Follow-up in about 1 week (around 1/2/2019).

## 2019-01-02 ENCOUNTER — HOSPITAL ENCOUNTER (OUTPATIENT)
Dept: WOUND CARE | Facility: HOSPITAL | Age: 69
Discharge: HOME | End: 2019-01-02
Attending: SURGERY
Payer: MEDICARE

## 2019-01-02 VITALS
RESPIRATION RATE: 20 BRPM | HEIGHT: 62 IN | HEART RATE: 82 BPM | TEMPERATURE: 99 F | SYSTOLIC BLOOD PRESSURE: 157 MMHG | BODY MASS INDEX: 29.44 KG/M2 | DIASTOLIC BLOOD PRESSURE: 71 MMHG | WEIGHT: 160 LBS

## 2019-01-02 DIAGNOSIS — Z90.49 HISTORY OF HEMICOLECTOMY: ICD-10-CM

## 2019-01-02 DIAGNOSIS — L98.499 TYPE 2 DIABETES MELLITUS WITH OTHER SKIN ULCER, UNSPECIFIED WHETHER LONG TERM INSULIN USE: ICD-10-CM

## 2019-01-02 DIAGNOSIS — Z16.12 INFECTION DUE TO ESBL-PRODUCING ESCHERICHIA COLI: ICD-10-CM

## 2019-01-02 DIAGNOSIS — A49.8 INFECTION DUE TO ESBL-PRODUCING ESCHERICHIA COLI: ICD-10-CM

## 2019-01-02 DIAGNOSIS — Z98.890 S/P EXPLORATORY LAPAROTOMY: ICD-10-CM

## 2019-01-02 DIAGNOSIS — E11.622 TYPE 2 DIABETES MELLITUS WITH OTHER SKIN ULCER, UNSPECIFIED WHETHER LONG TERM INSULIN USE: ICD-10-CM

## 2019-01-02 DIAGNOSIS — I10 ESSENTIAL HYPERTENSION: ICD-10-CM

## 2019-01-02 DIAGNOSIS — L02.211 ABDOMINAL WALL ABSCESS: ICD-10-CM

## 2019-01-02 DIAGNOSIS — R10.84 GENERALIZED ABDOMINAL PAIN: ICD-10-CM

## 2019-01-02 DIAGNOSIS — K63.2 FISTULA OF INTESTINE, EXCLUDING RECTUM AND ANUS: ICD-10-CM

## 2019-01-02 DIAGNOSIS — T14.8XXA DRAINAGE FROM WOUND: Primary | ICD-10-CM

## 2019-01-02 DIAGNOSIS — L02.211 ABSCESS OF ABDOMINAL WALL: ICD-10-CM

## 2019-01-02 DIAGNOSIS — K63.2 COLONIC FISTULA: ICD-10-CM

## 2019-01-02 DIAGNOSIS — K63.2 COLOCUTANEOUS FISTULA: ICD-10-CM

## 2019-01-02 DIAGNOSIS — Z93.3 STATUS POST HARTMANN'S PROCEDURE: ICD-10-CM

## 2019-01-02 PROCEDURE — 99212 OFFICE O/P EST SF 10 MIN: CPT

## 2019-01-02 RX ORDER — LIDOCAINE HYDROCHLORIDE 10 MG/ML
1 INJECTION, SOLUTION EPIDURAL; INFILTRATION; INTRACAUDAL; PERINEURAL ONCE
Status: DISCONTINUED | OUTPATIENT
Start: 2019-01-02 | End: 2019-02-28 | Stop reason: HOSPADM

## 2019-01-02 RX ORDER — CEFAZOLIN SODIUM 2 G/50ML
2 SOLUTION INTRAVENOUS
Status: CANCELLED | OUTPATIENT
Start: 2019-01-02

## 2019-01-02 RX ORDER — SODIUM CHLORIDE 9 MG/ML
INJECTION, SOLUTION INTRAVENOUS CONTINUOUS
Status: CANCELLED | OUTPATIENT
Start: 2019-01-02

## 2019-01-02 NOTE — PROGRESS NOTES
"Subjective:       Patient ID: Azucena Morrison is a 68 y.o. female.    Chief Complaint: Non-healing Wound    6/29/17: Pt re-admit for distal abdominal wound. Pt denies any fevers or chills but states she feels nauseous at times. Returned to [place] June 28, from [place], reports much trouble with abdominal wound while in [place].  7/6/17-- Patient scheduled for surgical drainage of wound Tuesday 7/11/17DB  7/19/17-- Patient post op clinic visit.  8/16/17: CT of abdomen and pelvis done 8/9/17 due to suspected fistula  8/23/17-- U/S of abdomen done today.  12/13/17 Wound vac with 20cc drainage in clinic today.  1/10/18: on PO abx  1/24/18: Fistulagram ordered per Dr. Horton. Patient still on PO abx  02/21/18 Patient is not on antibiotics at this time. BS fasting 135 per patient report this am.  03/07/18 Culture of Anterior Abdominal Wound is positive. No antibiotics at this time.  fasting per patient report.    03/21/18 Patient c/o nausea along with abdominal tenderness near abd midline wound. Patient states that pain level is 6 and that she passed two sero-sanguineous clots from wound. Patient is afebrile this am.  3/28/18: patient continue antibiotics and reports that pain is less than last weeks clinic visit. Drainage has decreased as well  04/04/18 Patient states that abdominal pain is "pressure" sensation and that when she pushes down on her abdomen on either side of abdominal wound she feels like she has to urinate. Patient reports pain level of 3 this am.  fasting this am per patient report.   5/2/18: Patient had Abdomina Toribio today before wound care. She had discontinued Bactrim PO per Dr. Horton's recommendation and culture results and is now taking Amoxicillin PO  6/20/18: Pt reports itching to wound and periwound   6/27/18: patient reports no new complaints with regards to her wound or abdomen, wound continues to decrease in size and drainage  8/1/18: Pt reports increased pain to abdomen with " nausea and emesis since thursday 7/26/18, denies any fevers, patient did no go to ER as instructed by home health nurse on Sat. 7/28/18.  8/29/18: Patient admitted to hospital 8/2 for abdominal wound complications. Surgery for abdominal abscess per Dr. Horton on 8/3. Patient placed on IV antibiotics and discharged home on 8/18 with [facility]  and PICC line to LUE. Patient currently on IV ertapenem. IV medication sent to patients home per [facility].   9/5/18: IV Ertapenem to continue 1 week. Culture sent to lab. [facility]  sent orders. PICC line intact to E.  9/12/18 Antibiotics completed. Culture results pending. 1 deep stitch remaining.  9/12/18: Culture positive for E. Coli, patient to continue Ertapenem IV antibiotics x 2weeks. [facility] notified  9/26/18: F/U abdominal wound, wound continues to improve. Patient will complete IV antibiotics today  10/3/18: patient c/o diarrhea for 2 days and nausea with some vomiting. Labs and stool culture ordered. IV antibiotics discontinued today  10/10/2018 patient will start on IV antibiotic Invaz IV once a day, pending PICC line placement, order placed today  for PICC per Dr. Horton.  10/24/18: patient on IV antibiotics, tolerating well. Continue for 1 week  11/7/2018: IV Antibiotic discontinued.  11/8/2018: PICC line to left upper arm discontinued by Home Health.  11/21/2018 F/u for abdominal wall fistula , c/o nausea  No vomiting , no fever  12/5/2018: F/u for abdominal wall fisula, c/o gas, Dr. Horton will order Bentyl. Surgery schedule for January 2019.  12/12/2018: F/u for abdominal wall fistula, c/o abdominal pain. Dr. Horton ordered Norco 5/325mg tab 1 PO every 4 hours as needed for pain and referred patient for x-ray of abdomen after clinic today.  12/19/2018: F/u for abdominal wall fisula, c/o nausea, Dr. Horton re ordered Ondansetron (Zofran) 8mg one tab by mouth every eight hours as needed for nausea. No changes in wound condition from  last visit noted in clinic today.  12/26/2018: F/u abdominal wall fistula drainage, no c/o at this time. Denies any abdominal pain or nausea. Dr. Horton recommends surgery first week of January 2019 for abdominal fistula treatment and possible colostomy placement, patient agree.  01/02/2019: F/u abdominal wall fistula. Dressing with large amount of drainage, malodorous, Dr. Horton is scheduling surgery for third week of January, 2019.               Review of Systems   Constitutional: Negative.    HENT: Negative.    Eyes: Negative.    Respiratory: Negative.    Cardiovascular: Negative.    Gastrointestinal: Negative.    Genitourinary: Negative.    Musculoskeletal: Negative.    Skin: Positive for color change, rash and wound.   Neurological: Negative.    Psychiatric/Behavioral: Negative.        Objective:      Physical Exam   Constitutional: She is oriented to person, place, and time. She appears well-developed and well-nourished.   HENT:   Head: Normocephalic.   Eyes: Conjunctivae and EOM are normal. Pupils are equal, round, and reactive to light.   Neck: Normal range of motion. Neck supple.   Cardiovascular: Normal rate, regular rhythm, normal heart sounds and intact distal pulses.   Pulmonary/Chest: Effort normal and breath sounds normal.   Abdominal: Soft. Bowel sounds are normal.   Musculoskeletal: Normal range of motion.   Neurological: She is alert and oriented to person, place, and time. She has normal reflexes.   Skin: Skin is warm and dry.       Assessment:       1. Drainage from wound    2. Abdominal wall abscess    3. Generalized abdominal pain    4. Abscess of abdominal wall    5. Essential hypertension           Wound 10/31/18 0836 Abscess medial Abdomen #1 (Active)   10/31/18 0836    Pre-existing: Yes   Primary Wound Type: Abscess   Side:    Orientation: medial   Location: Abdomen   Wound/PI Number (optional): #1   Ankle-Brachial Index:    Pulses:    Removal Indication and Assessment:    Wound Outcome:     (Retired) Wound Type:    (Retired) Wound Length (cm):    (Retired) Wound Width (cm):    (Retired) Depth (cm):    Wound Description (Comments):    Removal Indications:    Wound Image   1/2/2019  8:53 AM   Wound WDL ex 1/2/2019  8:53 AM   Dressing Appearance Saturated 1/2/2019  8:53 AM   Drainage Amount Large 1/2/2019  8:53 AM   Drainage Characteristics/Odor Brown;Serosanguineous;Hugo;Malodorous 1/2/2019  8:53 AM   Appearance Pink;Red;White;Yellow;Smooth;Fibrin 1/2/2019  8:53 AM   Tissue loss description Partial thickness 1/2/2019  8:53 AM   Red (%), Wound Tissue Color 80 % 1/2/2019  8:53 AM   Yellow (%), Wound Tissue Color 20 % 1/2/2019  8:53 AM   Periwound Area Moist;Intact 1/2/2019  8:53 AM   Wound Edges Approximated 1/2/2019  8:53 AM   Wound Length (cm) 0.2 cm 1/2/2019  8:53 AM   Wound Width (cm) 0.2 cm 1/2/2019  8:53 AM   Wound Depth (cm) 2.8 cm 1/2/2019  8:53 AM   Wound Volume (cm^3) 0.11 cm^3 1/2/2019  8:53 AM   Wound Surface Area (cm^2) 0.04 cm^2 1/2/2019  8:53 AM   Care Cleansed with:;Sterile normal saline 1/2/2019  8:53 AM   Dressing Removed;Applied;Other (see comments) 1/2/2019  8:53 AM   Periwound Care Topical treatment applied 1/2/2019  8:53 AM   Dressing Change Due 01/04/19 1/2/2019  8:53 AM         Wound #1 Anterior Abdomen - midline  Other Orders  Dressings & Wound Care  Cleanse wound with Normal Saline. - Cavion or benzoin to periwound   betamethasone periwoind prn itching.  Antimicrobial Ointment/Cream. - Gentamycin to wound bed.  Other: - Apply iodoform gauze over wound, cover with aquacel extra or 4x4 gauze, secure with 5x5 aqacel border and mefix tape.   Do not probe depth of wound         Plan:                Follow-up in about 1 week (around 1/9/2019).  Surgery on third week of January, 2019.

## 2019-01-09 ENCOUNTER — HOSPITAL ENCOUNTER (OUTPATIENT)
Dept: PREADMISSION TESTING | Facility: HOSPITAL | Age: 69
Discharge: HOME | End: 2019-01-09
Attending: SURGERY
Payer: MEDICARE

## 2019-01-09 ENCOUNTER — HOSPITAL ENCOUNTER (OUTPATIENT)
Dept: WOUND CARE | Facility: HOSPITAL | Age: 69
Discharge: HOME | End: 2019-01-09
Attending: SURGERY
Payer: MEDICARE

## 2019-01-09 VITALS — TEMPERATURE: 98 F | HEART RATE: 80 BPM | SYSTOLIC BLOOD PRESSURE: 152 MMHG | DIASTOLIC BLOOD PRESSURE: 89 MMHG

## 2019-01-09 VITALS
OXYGEN SATURATION: 98 % | HEIGHT: 62 IN | RESPIRATION RATE: 18 BRPM | BODY MASS INDEX: 31.34 KG/M2 | HEART RATE: 82 BPM | SYSTOLIC BLOOD PRESSURE: 177 MMHG | DIASTOLIC BLOOD PRESSURE: 63 MMHG | WEIGHT: 170.31 LBS

## 2019-01-09 DIAGNOSIS — T14.8XXA DRAINAGE FROM WOUND: Primary | ICD-10-CM

## 2019-01-09 DIAGNOSIS — Z01.818 PREOP EXAMINATION: Primary | ICD-10-CM

## 2019-01-09 PROCEDURE — 99213 OFFICE O/P EST LOW 20 MIN: CPT | Performed by: NURSE PRACTITIONER

## 2019-01-09 RX ORDER — SODIUM CHLORIDE, SODIUM LACTATE, POTASSIUM CHLORIDE, CALCIUM CHLORIDE 600; 310; 30; 20 MG/100ML; MG/100ML; MG/100ML; MG/100ML
INJECTION, SOLUTION INTRAVENOUS CONTINUOUS
Status: CANCELLED | OUTPATIENT
Start: 2019-01-09

## 2019-01-09 RX ORDER — LIDOCAINE HYDROCHLORIDE 10 MG/ML
1 INJECTION, SOLUTION EPIDURAL; INFILTRATION; INTRACAUDAL; PERINEURAL ONCE
Status: CANCELLED | OUTPATIENT
Start: 2019-01-09 | End: 2019-01-09

## 2019-01-09 NOTE — PLAN OF CARE
· Arrive on 1/25/2018 at  7:00 am.  · Report to the 2nd floor Procedural Check In Room .   · Nothing to eat or drink after midnight the night before your procedure.  · Take the Carvedilol and Hydrochlorothiazide medications the morning of surgery with a small sip of water.                                                         · Please remove all body piercings and leave all your jewelery and valuables at home .  · Please remove your contact lenses.   · Wear loose comfortable clothing.  · You will not be able to drive that day, please make arrangement for transportation to and from your procedure.  · You cannot be alone for 24 hours after anesthesia. Make arrangements as needed.  · Shower the night before your procedure and the morning of your procedure with Hibiclens antibacterial solution.  · No lotions, creams or powders  · Do not shave 3 days prior to procedure  · Report any signs or symptoms of an infection to your surgeon. Examples: urinary (bladder) infection, upper respiratory infection, skin boils.   · Practice good hand washing before, during, and after procedure.   · Stop Aspirin, Ibuprofen, Advil, Motrin, Aleve, Nuprin, Naprosyn (all NSAID Medication) or any other blood thinners 5 days before your procedure unless directed by your physician.  Also hold all over the counter vitamins and herbal supplements for 5 days prior to your procedure.  · You may take Tylenol or Acetaminophen up the day of surgery for any pain.        I have read or had read and explained to me, and understand the above information.    Additional comments or instructions:  For additional questions call [phone]        Pre-Op Bathing Instructions    Before surgery, you can play an important role in your own health.    Because skin is not sterile, we need to be sure that your skin is as free of germs as possible. By following the instructions below, you can reduce the number of germs on your skin before surgery.    IMPORTANT: You will  need to shower with a special soap called Hibiclens*, available at any pharmacy, over the counter usually in the first aid isle.  If you are allergic to Chlorhexidine (the antiseptic in Hibiclens), use an antibacterial soap such as Dial Soap for your preoperative showers.  You will shower with Hibiclens the night before and the morning of surgery. Both the night before your surgery and the morning of your surgery see steps 2 and 3.   Do not use Hibiclens on the head, face or genitals to avoid injury to those areas.    STEP #1  1.  Shower with Hibiclens solution night before and the morning of surgery.      STEP #2: THE NIGHT BEFORE YOUR SURGERY     1. Do not shave the area of your body where your surgery will be performed.  2. Wash your hair as usual with your normal  Shampoo. Wash body shoulder to toes with your normal soap.  3. Squeeze Hibiclens into hand apply to surgical site.   4. Wash the site gently for five (5) minutes. Do not scrub your skin too hard.   5. Do not wash with your regular soap after Hibiclens is used.  6. Rinse your body thoroughly.  7. Pat yourself dry with a clean, soft towel.  8. Do not use lotion, cream, or powder.  9. Wear clean clothes.    STEP #3: THE MORNING OF YOUR SURGERY     1. Repeat Step #2.    * Not to be used by people allergic to Chlorhexidine.          Anesthesia: General Anesthesia  Youre due to have surgery. During surgery, youll be given medication called anesthesia. (It is also called anesthetic.) This will keep you comfortable and pain-free. Your anesthesia provider will use general anesthesia. This sheet tells you more about it.  What is general anesthesia?    General anesthesia puts you into a state like deep sleep. It goes into the bloodstream (IV anesthetics), into the lungs (gas anesthetics), or both. You feel nothing during the procedure. You will not remember it. During the procedure, the anesthesia provider monitors you continuously. He or she checks your heart  rate and rhythm, blood pressure, breathing, and blood oxygen.  · IV Anesthetics. IV anesthetics are given through an IV line in your arm. Theyre often given first. This is so you are asleep before a gas anesthetic is started. Some kinds of IV anesthetics relieve pain. Others relax you. Your doctor will decide which kind is best in your case.  · Gas Anesthetics. Gas anesthetics are breathed into the lungs. They are often used to keep you asleep. They can be given through a facemask or a tube placed in your larynx or trachea (breathing tube).  · If you have a facemask, your anesthesia provider will most likely place it over your nose and mouth while youre still awake. Youll breathe oxygen through the mask as your IV anesthetic is started. Gas anesthetic may be added through the mask.  · If you have a tube in the larynx or trachea, it will be inserted into your throat after youre asleep.  Anesthesia tools and medications  You will likely have:  · IV anesthetics. These are put into an IV line into your bloodstream.  · Gas anesthetics. You breathe these anesthetics into your lungs, where they pass into your bloodstream.  · Pulse oximeter. This is a small clip that is attached to the end of your finger. This measures your blood oxygen level.  · Electrocardiography leads (electrodes). These are small sticky pads that are placed on your chest. They record your heart rate and rhythm.  · Blood pressure cuff. This reads your blood pressure.    Anesthesia safety  · Follow all instructions you are given for how long not to eat or drink before your procedure.  · Be sure your doctor knows what medications and drugs you take. This includes over-the-counter medications, herbs, supplements, alcohol or other drugs. You will be asked when those were last taken.  · Have an adult family member or friend drive you home after the procedure.  · For the first 24 hours after your surgery:  · Do not drive or use heavy equipment.  · Have a  trusted family member or spouse make important decisions or sign documents.  · Avoid alcohol.  · Have a responsible adult stay with you. He or she can watch for problems and help keep you safe.  © 3443-6470 [employer]. [address] [place] [address], [place] [address]. All rights reserved. This information is not intended as a substitute for professional medical care. Always follow your healthcare professional's instructions.

## 2019-01-09 NOTE — PROGRESS NOTES
[facility]  [address] [address]  Nurse Visit    Subjective:     Patient seen in clinic today.  Dressing changed as ordered.      Assessment:          Wound 10/31/18 0836 Abscess medial Abdomen #1 (Active)   10/31/18 0836    Pre-existing: Yes   Primary Wound Type: Abscess   Side:    Orientation: medial   Location: Abdomen   Wound/PI Number (optional): #1   Ankle-Brachial Index:    Pulses:    Removal Indication and Assessment:    Wound Outcome:    (Retired) Wound Type:    (Retired) Wound Length (cm):    (Retired) Wound Width (cm):    (Retired) Depth (cm):    Wound Description (Comments):    Removal Indications:    Dressing Appearance Saturated 1/9/2019  9:00 AM   Drainage Amount Large 1/9/2019  9:00 AM   Drainage Characteristics/Odor Serosanguineous;Brown;Malodorous 1/9/2019  9:00 AM   Appearance Pink;Red;White;Yellow;Smooth;Fibrin 1/9/2019  9:00 AM   Tissue loss description Partial thickness 1/9/2019  9:00 AM   Red (%), Wound Tissue Color 80 % 1/9/2019  9:00 AM   Yellow (%), Wound Tissue Color 20 % 1/9/2019  9:00 AM   Periwound Area Intact;Moist 1/9/2019  9:00 AM   Wound Length (cm) 0.2 cm 1/9/2019  9:00 AM   Wound Width (cm) 0.2 cm 1/9/2019  9:00 AM   Wound Depth (cm) 2.8 cm 1/9/2019  9:00 AM   Wound Volume (cm^3) 0.11 cm^3 1/9/2019  9:00 AM   Wound Surface Area (cm^2) 0.04 cm^2 1/9/2019  9:00 AM   Care Cleansed with:;Sterile normal saline 1/9/2019  9:00 AM   Dressing Applied;Non-adherent;Island/border;Gauze 1/9/2019  9:00 AM   Periwound Care Topical treatment applied 1/9/2019  9:00 AM   Dressing Change Due 01/11/19 1/9/2019  9:00 AM         ICD-10-CM ICD-9-CM   1. Drainage from wound T14.8XXA 879.8     Cleaned wound with normal saline. Cavilon to periwound. Applied gentamycin to wound bed. Covered with iodoform gauze, aquacel extra, aquacel border and mefix tape.     Plan:   Follow-up in about 1 week (around 1/16/2019) for Dr. Horton.

## 2019-01-09 NOTE — PRE-PROCEDURE INSTRUCTIONS
Allergies, medical, surgical, family and psychosocial histories reviewed with patient. Periop plan of care discussed. Preop instructions given, including medications to take and to hold. Hibiclens soap and instructions on use given. Time given for questions and pt voiced understanding.      Pt contact number:   [phone]

## 2019-01-09 NOTE — DISCHARGE INSTRUCTIONS
· Arrive on 1/25/2018 at  7:00 am.  · Report to the 2nd floor Procedural Check In Room .   · Nothing to eat or drink after midnight the night before your procedure.  · Take the Carvedilol and Hydrochlorothiazide medications the morning of surgery with a small sip of water.                                                         · Please remove all body piercings and leave all your jewelery and valuables at home .  · Please remove your contact lenses.   · Wear loose comfortable clothing.  · You will not be able to drive that day, please make arrangement for transportation to and from your procedure.  · You cannot be alone for 24 hours after anesthesia. Make arrangements as needed.  · Shower the night before your procedure and the morning of your procedure with Hibiclens antibacterial solution.  · No lotions, creams or powders  · Do not shave 3 days prior to procedure  · Report any signs or symptoms of an infection to your surgeon. Examples: urinary (bladder) infection, upper respiratory infection, skin boils.   · Practice good hand washing before, during, and after procedure.   · Stop Aspirin, Ibuprofen, Advil, Motrin, Aleve, Nuprin, Naprosyn (all NSAID Medication) or any other blood thinners 5 days before your procedure unless directed by your physician.  Also hold all over the counter vitamins and herbal supplements for 5 days prior to your procedure.  · You may take Tylenol or Acetaminophen up the day of surgery for any pain.        I have read or had read and explained to me, and understand the above information.    Additional comments or instructions:  For additional questions call [phone]        Pre-Op Bathing Instructions    Before surgery, you can play an important role in your own health.    Because skin is not sterile, we need to be sure that your skin is as free of germs as possible. By following the instructions below, you can reduce the number of germs on your skin before surgery.    IMPORTANT: You will  need to shower with a special soap called Hibiclens*, available at any pharmacy, over the counter usually in the first aid isle.  If you are allergic to Chlorhexidine (the antiseptic in Hibiclens), use an antibacterial soap such as Dial Soap for your preoperative showers.  You will shower with Hibiclens the night before and the morning of surgery. Both the night before your surgery and the morning of your surgery see steps 2 and 3.   Do not use Hibiclens on the head, face or genitals to avoid injury to those areas.    STEP #1  1.  Shower with Hibiclens solution night before and the morning of surgery.      STEP #2: THE NIGHT BEFORE YOUR SURGERY     1. Do not shave the area of your body where your surgery will be performed.  2. Wash your hair as usual with your normal  Shampoo. Wash body shoulder to toes with your normal soap.  3. Squeeze Hibiclens into hand apply to surgical site.   4. Wash the site gently for five (5) minutes. Do not scrub your skin too hard.   5. Do not wash with your regular soap after Hibiclens is used.  6. Rinse your body thoroughly.  7. Pat yourself dry with a clean, soft towel.  8. Do not use lotion, cream, or powder.  9. Wear clean clothes.    STEP #3: THE MORNING OF YOUR SURGERY     1. Repeat Step #2.    * Not to be used by people allergic to Chlorhexidine.          Anesthesia: General Anesthesia  Youre due to have surgery. During surgery, youll be given medication called anesthesia. (It is also called anesthetic.) This will keep you comfortable and pain-free. Your anesthesia provider will use general anesthesia. This sheet tells you more about it.  What is general anesthesia?    General anesthesia puts you into a state like deep sleep. It goes into the bloodstream (IV anesthetics), into the lungs (gas anesthetics), or both. You feel nothing during the procedure. You will not remember it. During the procedure, the anesthesia provider monitors you continuously. He or she checks your heart  rate and rhythm, blood pressure, breathing, and blood oxygen.  · IV Anesthetics. IV anesthetics are given through an IV line in your arm. Theyre often given first. This is so you are asleep before a gas anesthetic is started. Some kinds of IV anesthetics relieve pain. Others relax you. Your doctor will decide which kind is best in your case.  · Gas Anesthetics. Gas anesthetics are breathed into the lungs. They are often used to keep you asleep. They can be given through a facemask or a tube placed in your larynx or trachea (breathing tube).  · If you have a facemask, your anesthesia provider will most likely place it over your nose and mouth while youre still awake. Youll breathe oxygen through the mask as your IV anesthetic is started. Gas anesthetic may be added through the mask.  · If you have a tube in the larynx or trachea, it will be inserted into your throat after youre asleep.  Anesthesia tools and medications  You will likely have:  · IV anesthetics. These are put into an IV line into your bloodstream.  · Gas anesthetics. You breathe these anesthetics into your lungs, where they pass into your bloodstream.  · Pulse oximeter. This is a small clip that is attached to the end of your finger. This measures your blood oxygen level.  · Electrocardiography leads (electrodes). These are small sticky pads that are placed on your chest. They record your heart rate and rhythm.  · Blood pressure cuff. This reads your blood pressure.    Anesthesia safety  · Follow all instructions you are given for how long not to eat or drink before your procedure.  · Be sure your doctor knows what medications and drugs you take. This includes over-the-counter medications, herbs, supplements, alcohol or other drugs. You will be asked when those were last taken.  · Have an adult family member or friend drive you home after the procedure.  · For the first 24 hours after your surgery:  · Do not drive or use heavy equipment.  · Have a  trusted family member or spouse make important decisions or sign documents.  · Avoid alcohol.  · Have a responsible adult stay with you. He or she can watch for problems and help keep you safe.  © 6575-9010 [employer]. [address] [place] [address], [place] [address]. All rights reserved. This information is not intended as a substitute for professional medical care. Always follow your healthcare professional's instructions.

## 2019-01-10 ENCOUNTER — TELEPHONE (OUTPATIENT)
Dept: ADMINISTRATIVE | Facility: CLINIC | Age: 69
End: 2019-01-10

## 2019-01-16 ENCOUNTER — HOSPITAL ENCOUNTER (OUTPATIENT)
Dept: WOUND CARE | Facility: HOSPITAL | Age: 69
Discharge: HOME | End: 2019-01-16
Attending: SURGERY
Payer: MEDICARE

## 2019-01-16 VITALS
HEART RATE: 81 BPM | TEMPERATURE: 99 F | SYSTOLIC BLOOD PRESSURE: 156 MMHG | DIASTOLIC BLOOD PRESSURE: 84 MMHG | RESPIRATION RATE: 18 BRPM

## 2019-01-16 DIAGNOSIS — T14.8XXA DRAINAGE FROM WOUND: Primary | ICD-10-CM

## 2019-01-16 DIAGNOSIS — L02.211 ABDOMINAL WALL ABSCESS: ICD-10-CM

## 2019-01-16 DIAGNOSIS — Z98.890 S/P EXPLORATORY LAPAROTOMY: ICD-10-CM

## 2019-01-16 DIAGNOSIS — Z79.4 TYPE 2 DIABETES MELLITUS WITHOUT COMPLICATION, WITH LONG-TERM CURRENT USE OF INSULIN: ICD-10-CM

## 2019-01-16 DIAGNOSIS — K63.2 FISTULA OF INTESTINE, EXCLUDING RECTUM AND ANUS: ICD-10-CM

## 2019-01-16 DIAGNOSIS — T81.89XA SUTURE GRANULOMA, INITIAL ENCOUNTER: ICD-10-CM

## 2019-01-16 DIAGNOSIS — I10 ESSENTIAL HYPERTENSION: ICD-10-CM

## 2019-01-16 DIAGNOSIS — E11.9 TYPE 2 DIABETES MELLITUS WITHOUT COMPLICATION, WITH LONG-TERM CURRENT USE OF INSULIN: ICD-10-CM

## 2019-01-16 PROCEDURE — 99213 OFFICE O/P EST LOW 20 MIN: CPT

## 2019-01-16 RX ORDER — HYDROCODONE BITARTRATE AND ACETAMINOPHEN 5; 325 MG/1; MG/1
1 TABLET ORAL EVERY 4 HOURS PRN
Qty: 32 TABLET | Refills: 0 | Status: SHIPPED | OUTPATIENT
Start: 2019-01-16 | End: 2019-02-06 | Stop reason: SDUPTHER

## 2019-01-16 RX ORDER — ONDANSETRON HYDROCHLORIDE 8 MG/1
8 TABLET, FILM COATED ORAL EVERY 8 HOURS PRN
Qty: 40 TABLET | Refills: 1 | Status: SHIPPED | OUTPATIENT
Start: 2019-01-16 | End: 2019-07-17 | Stop reason: SDUPTHER

## 2019-01-16 NOTE — PROGRESS NOTES
"Subjective:       Patient ID: Azucena Morrison is a 68 y.o. female.    Chief Complaint: Non-healing Wound    6/29/17: Pt re-admit for distal abdominal wound. Pt denies any fevers or chills but states she feels nauseous at times. Returned to [place] June 28, from [place], reports much trouble with abdominal wound while in [place].  7/6/17-- Patient scheduled for surgical drainage of wound Tuesday 7/11/17DB  7/19/17-- Patient post op clinic visit.  8/16/17: CT of abdomen and pelvis done 8/9/17 due to suspected fistula  8/23/17-- U/S of abdomen done today.  12/13/17 Wound vac with 20cc drainage in clinic today.  1/10/18: on PO abx  1/24/18: Fistulagram ordered per Dr. Horton. Patient still on PO abx  02/21/18 Patient is not on antibiotics at this time. BS fasting 135 per patient report this am.  03/07/18 Culture of Anterior Abdominal Wound is positive. No antibiotics at this time.  fasting per patient report.    03/21/18 Patient c/o nausea along with abdominal tenderness near abd midline wound. Patient states that pain level is 6 and that she passed two sero-sanguineous clots from wound. Patient is afebrile this am.  3/28/18: patient continue antibiotics and reports that pain is less than last weeks clinic visit. Drainage has decreased as well  04/04/18 Patient states that abdominal pain is "pressure" sensation and that when she pushes down on her abdomen on either side of abdominal wound she feels like she has to urinate. Patient reports pain level of 3 this am.  fasting this am per patient report.   5/2/18: Patient had Abdomina Toribio today before wound care. She had discontinued Bactrim PO per Dr. Horton's recommendation and culture results and is now taking Amoxicillin PO  6/20/18: Pt reports itching to wound and periwound   6/27/18: patient reports no new complaints with regards to her wound or abdomen, wound continues to decrease in size and drainage  8/1/18: Pt reports increased pain to abdomen with " nausea and emesis since thursday 7/26/18, denies any fevers, patient did no go to ER as instructed by home health nurse on Sat. 7/28/18.  8/29/18: Patient admitted to hospital 8/2 for abdominal wound complications. Surgery for abdominal abscess per Dr. Horton on 8/3. Patient placed on IV antibiotics and discharged home on 8/18 with [facility]  and PICC line to LUE. Patient currently on IV ertapenem. IV medication sent to patients home per [facility].   9/5/18: IV Ertapenem to continue 1 week. Culture sent to lab. [facility]  sent orders. PICC line intact to E.  9/12/18 Antibiotics completed. Culture results pending. 1 deep stitch remaining.  9/12/18: Culture positive for E. Coli, patient to continue Ertapenem IV antibiotics x 2weeks. [facility] notified  9/26/18: F/U abdominal wound, wound continues to improve. Patient will complete IV antibiotics today  10/3/18: patient c/o diarrhea for 2 days and nausea with some vomiting. Labs and stool culture ordered. IV antibiotics discontinued today  10/10/2018 patient will start on IV antibiotic Invaz IV once a day, pending PICC line placement, order placed today  for PICC per Dr. Horton.  10/24/18: patient on IV antibiotics, tolerating well. Continue for 1 week  11/7/2018: IV Antibiotic discontinued.  11/8/2018: PICC line to left upper arm discontinued by Home Health.  11/21/2018 F/u for abdominal wall fistula , c/o nausea  No vomiting , no fever  12/5/2018: F/u for abdominal wall fisula, c/o gas, Dr. Horton will order Bentyl. Surgery schedule for January 2019.  12/12/2018: F/u for abdominal wall fistula, c/o abdominal pain. Dr. Horton ordered Norco 5/325mg tab 1 PO every 4 hours as needed for pain and referred patient for x-ray of abdomen after clinic today.  12/19/2018: F/u for abdominal wall fisula, c/o nausea, Dr. Horton re ordered Ondansetron (Zofran) 8mg one tab by mouth every eight hours as needed for nausea. No changes in wound condition from  last visit noted in clinic today.  12/26/2018: F/u abdominal wall fistula drainage, no c/o at this time. Denies any abdominal pain or nausea. Dr. Horton recommends surgery first week of January 2019 for abdominal fistula treatment and possible colostomy placement, patient agree.  01/02/2019: F/u abdominal wall fistula. Dressing with large amount of drainage, malodorous, Dr. Horton is scheduling surgery for third week of January, 2019.   1/16/19: F/U Dr. Horton today in clinic, surgery scheduled for next Friday 1/25/19 with Dr. Horton.  Subjective:       Patient ID: Azucena Morrison is a 68 y.o. female.    Chief Complaint: Non-healing Wound    F//u for abdominal wound , still draining       Review of Systems   Constitutional: Negative.  Negative for chills and fever.   HENT: Negative.    Eyes: Negative.    Respiratory: Negative.    Cardiovascular: Negative.    Gastrointestinal: Positive for abdominal distention and abdominal pain.   Genitourinary: Negative.    Musculoskeletal: Negative.    Skin: Positive for color change, rash and wound.   Neurological: Negative.    Psychiatric/Behavioral: Negative.        Objective:      Physical Exam   Constitutional: She is oriented to person, place, and time. She appears well-developed and well-nourished.   HENT:   Head: Normocephalic.   Eyes: Conjunctivae and EOM are normal. Pupils are equal, round, and reactive to light.   Neck: Normal range of motion. Neck supple.   Cardiovascular: Normal rate, regular rhythm, normal heart sounds and intact distal pulses.   Pulmonary/Chest: Effort normal and breath sounds normal.   Abdominal: Soft. Bowel sounds are normal.   Musculoskeletal: Normal range of motion.   Neurological: She is alert and oriented to person, place, and time. She has normal reflexes.   Skin: Skin is warm and dry.       Assessment:       1. Drainage from wound    2. Abdominal wall abscess    3. Fistula of intestine, excluding rectum and anus    4. Type 2 diabetes  mellitus without complication, with long-term current use of insulin    5. Suture granuloma, initial encounter    6. S/P exploratory laparotomy    7. Essential hypertension           Wound 10/31/18 0836 Abscess medial Abdomen #1 (Active)   10/31/18 0836    Pre-existing: Yes   Primary Wound Type: Abscess   Side:    Orientation: medial   Location: Abdomen   Wound/PI Number (optional): #1   Ankle-Brachial Index:    Pulses:    Removal Indication and Assessment:    Wound Outcome:    (Retired) Wound Type:    (Retired) Wound Length (cm):    (Retired) Wound Width (cm):    (Retired) Depth (cm):    Wound Description (Comments):    Removal Indications:    Dressing Appearance No dressing 1/16/2019  8:50 AM   Drainage Amount Moderate 1/16/2019  8:50 AM   Drainage Characteristics/Odor Serosanguineous;Brown 1/16/2019  8:50 AM   Appearance Pink;Red;Smooth 1/16/2019  8:50 AM   Tissue loss description Partial thickness 1/16/2019  9:09 AM   Red (%), Wound Tissue Color 100 % 1/16/2019  8:50 AM   Periwound Area Intact;Moist;Redness 1/16/2019  8:50 AM   Wound Edges Approximated 1/16/2019  8:50 AM   Wound Length (cm) 0.2 cm 1/16/2019  8:50 AM   Wound Width (cm) 0.2 cm 1/16/2019  8:50 AM   Wound Depth (cm) 3.8 cm 1/16/2019  8:50 AM   Wound Volume (cm^3) 0.15 cm^3 1/16/2019  8:50 AM   Wound Surface Area (cm^2) 0.04 cm^2 1/16/2019  8:50 AM   Care Cleansed with:;Sterile normal saline 1/16/2019  9:09 AM   Dressing Applied;Island/border;Gauze, wet to moist 1/16/2019  9:09 AM   Periwound Care Topical treatment applied;Skin barrier film applied 1/16/2019  9:09 AM   Dressing Change Due 01/18/19 1/16/2019  9:09 AM       Wound #1 Anterior Abdomen - midline  Other Orders  Dressings & Wound Care  Cleanse wound with Normal Saline. - Cavion or benzoin to periwound   betamethasone periwoind prn itching.  Antimicrobial Ointment/Cream. - Gentamycin to wound bed.  Other: - Apply iodoform gauze over wound, cover with aquacel extra or 4x4 gauze, secure with  5x5 aqacel border and mefix tape.   Do not probe depth of wound  Change dressing every: - monday and fri, per HH, Wed in clinic  Rx sent to patient's pharmacy for pain and nausea medication today in clinic.  Plan:                Follow up 1 week with Dr. Horton

## 2019-01-23 ENCOUNTER — HOSPITAL ENCOUNTER (OUTPATIENT)
Dept: WOUND CARE | Facility: HOSPITAL | Age: 69
Discharge: HOME | DRG: 389 | End: 2019-01-23
Attending: SURGERY
Payer: MEDICARE

## 2019-01-23 VITALS
HEIGHT: 62 IN | SYSTOLIC BLOOD PRESSURE: 152 MMHG | BODY MASS INDEX: 31.28 KG/M2 | HEART RATE: 77 BPM | RESPIRATION RATE: 18 BRPM | DIASTOLIC BLOOD PRESSURE: 83 MMHG | TEMPERATURE: 99 F | WEIGHT: 170 LBS

## 2019-01-23 DIAGNOSIS — E11.622 TYPE 2 DIABETES MELLITUS WITH OTHER SKIN ULCER, WITH LONG-TERM CURRENT USE OF INSULIN: ICD-10-CM

## 2019-01-23 DIAGNOSIS — E11.622 TYPE 2 DIABETES MELLITUS WITH OTHER SKIN ULCER, UNSPECIFIED WHETHER LONG TERM INSULIN USE: ICD-10-CM

## 2019-01-23 DIAGNOSIS — L98.499 TYPE 2 DIABETES MELLITUS WITH OTHER SKIN ULCER, UNSPECIFIED WHETHER LONG TERM INSULIN USE: ICD-10-CM

## 2019-01-23 DIAGNOSIS — Z98.890 S/P EXPLORATORY LAPAROTOMY: ICD-10-CM

## 2019-01-23 DIAGNOSIS — L02.211 ABDOMINAL WALL ABSCESS: ICD-10-CM

## 2019-01-23 DIAGNOSIS — T14.8XXA DRAINAGE FROM WOUND: Primary | ICD-10-CM

## 2019-01-23 DIAGNOSIS — K63.2 COLONIC FISTULA: ICD-10-CM

## 2019-01-23 DIAGNOSIS — Z79.4 TYPE 2 DIABETES MELLITUS WITH OTHER SKIN ULCER, WITH LONG-TERM CURRENT USE OF INSULIN: ICD-10-CM

## 2019-01-23 PROCEDURE — 87075 CULTR BACTERIA EXCEPT BLOOD: CPT

## 2019-01-23 PROCEDURE — 87186 SC STD MICRODIL/AGAR DIL: CPT

## 2019-01-23 PROCEDURE — 87070 CULTURE OTHR SPECIMN AEROBIC: CPT

## 2019-01-23 PROCEDURE — 87077 CULTURE AEROBIC IDENTIFY: CPT | Mod: 59

## 2019-01-23 PROCEDURE — 99213 OFFICE O/P EST LOW 20 MIN: CPT

## 2019-01-23 PROCEDURE — 87076 CULTURE ANAEROBE IDENT EACH: CPT

## 2019-01-23 PROCEDURE — 99211 OFF/OP EST MAY X REQ PHY/QHP: CPT

## 2019-01-23 NOTE — PROGRESS NOTES
"Subjective:       Patient ID: Azucena Morrison is a 68 y.o. female.    Chief Complaint: Non-healing Wound    6/29/17: Pt re-admit for distal abdominal wound. Pt denies any fevers or chills but states she feels nauseous at times. Returned to [place] June [place], from [place], reports much trouble with abdominal wound while in [place].  7/6/17-- Patient scheduled for surgical drainage of wound Tuesday 7/11/17DB  7/19/17-- Patient post op clinic visit.  8/16/17: CT of abdomen and pelvis done 8/9/17 due to suspected fistula  8/23/17-- U/S of abdomen done today.  12/13/17 Wound vac with 20cc drainage in clinic today.  1/10/18: on PO abx  1/24/18: Fistulagram ordered per Dr. Horton. Patient still on PO abx  02/21/18 Patient is not on antibiotics at this time. BS fasting 135 per patient report this am.  03/07/18 Culture of Anterior Abdominal Wound is positive. No antibiotics at this time.  fasting per patient report.    03/21/18 Patient c/o nausea along with abdominal tenderness near abd midline wound. Patient states that pain level is 6 and that she passed two sero-sanguineous clots from wound. Patient is afebrile this am.  3/28/18: patient continue antibiotics and reports that pain is less than last weeks clinic visit. Drainage has decreased as well  04/04/18 Patient states that abdominal pain is "pressure" sensation and that when she pushes down on her abdomen on either side of abdominal wound she feels like she has to urinate. Patient reports pain level of 3 this am.  fasting this am per patient report.   5/2/18: Patient had Abdomina Toribio today before wound care. She had discontinued Bactrim PO per Dr. Horton's recommendation and culture results and is now taking Amoxicillin PO  6/20/18: Pt reports itching to wound and periwound   6/27/18: patient reports no new complaints with regards to her wound or abdomen, wound continues to decrease in size and drainage  8/1/18: Pt reports increased pain to abdomen with " nausea and emesis since thursday 7/26/18, denies any fevers, patient did no go to ER as instructed by home health nurse on Sat. 7/28/18.  8/29/18: Patient admitted to hospital 8/2 for abdominal wound complications. Surgery for abdominal abscess per Dr. Horton on 8/3. Patient placed on IV antibiotics and discharged home on 8/18 with [facility]  and PICC line to LUE. Patient currently on IV ertapenem. IV medication sent to patients home per [facility].   9/5/18: IV Ertapenem to continue 1 week. Culture sent to lab. [facility]  sent orders. PICC line intact to E.  9/12/18 Antibiotics completed. Culture results pending. 1 deep stitch remaining.  9/12/18: Culture positive for E. Coli, patient to continue Ertapenem IV antibiotics x 2weeks. [facility] notified  9/26/18: F/U abdominal wound, wound continues to improve. Patient will complete IV antibiotics today  10/3/18: patient c/o diarrhea for 2 days and nausea with some vomiting. Labs and stool culture ordered. IV antibiotics discontinued today  10/10/2018 patient will start on IV antibiotic Invaz IV once a day, pending PICC line placement, order placed today  for PICC per Dr. Horton.  10/24/18: patient on IV antibiotics, tolerating well. Continue for 1 week  11/7/2018: IV Antibiotic discontinued.  11/8/2018: PICC line to left upper arm discontinued by Home Health.  11/21/2018 F/u for abdominal wall fistula , c/o nausea  No vomiting , no fever  12/5/2018: F/u for abdominal wall fisula, c/o gas, Dr. Horton will order Bentyl. Surgery schedule for January 2019.  12/12/2018: F/u for abdominal wall fistula, c/o abdominal pain. Dr. Horton ordered Norco 5/325mg tab 1 PO every 4 hours as needed for pain and referred patient for x-ray of abdomen after clinic today.  12/19/2018: F/u for abdominal wall fisula, c/o nausea, Dr. Horton re ordered Ondansetron (Zofran) 8mg one tab by mouth every eight hours as needed for nausea. No changes in wound condition from  last visit noted in clinic today.  12/26/2018: F/u abdominal wall fistula drainage, no c/o at this time. Denies any abdominal pain or nausea. Dr. Horton recommends surgery first week of January 2019 for abdominal fistula treatment and possible colostomy placement, patient agree.  01/02/2019: F/u abdominal wall fistula. Dressing with large amount of drainage, malodorous, Dr. Horton is scheduling surgery for third week of January, 2019.  1/16/19: F/U Dr. Horton today in clinic, surgery scheduled for next Friday 1/25/19 with Dr. Horton.    01/23/2019: Presents to wound care clinic for f/u abdominal wound care tx. Surgery scheduled with Dr. Horton for Friday 01/25/2019. Dr. Horton explained Mrs. Morrison surgery procedure including possible complications, Nurse  ([place]ian) present, patient verbalizes understanding of procedure including possible complications, all questions from patient were answered by Dr. Horton including blood sugar and blood pressure medications per patient's concerns. Consent for surgery and blood transfusion signed by patient, Dr. Horton and nurse witnessed.  Abdominal wound cultures collected per Dr. Horton, cultures sent to lab/micro pending results. Dr. Horton prescribed the following orders: fleet enema for Thursday 1/24/2019, clear liquid diet and not to eat after midnight all for 1/24/2019. Start taking Neomycin and erythromycin by mouth three times a day (1/24/2019) and dulcolax one tab by mouth twice a day, Mrs. Morrison voices understanding.        Review of Systems   Constitutional: Negative.    HENT: Negative.    Eyes: Negative.    Respiratory: Negative.    Cardiovascular: Negative.    Gastrointestinal: Negative.    Genitourinary: Negative.    Musculoskeletal: Negative.    Skin: Negative.    Neurological: Negative.    Psychiatric/Behavioral: Negative.        Objective:      Physical Exam   Constitutional: She is oriented to person, place, and time. She appears  well-developed and well-nourished.   HENT:   Head: Normocephalic.   Eyes: Conjunctivae and EOM are normal. Pupils are equal, round, and reactive to light.   Neck: Normal range of motion. Neck supple.   Cardiovascular: Normal rate, regular rhythm, normal heart sounds and intact distal pulses.   Pulmonary/Chest: Effort normal and breath sounds normal.   Abdominal: Soft. Bowel sounds are normal.   Musculoskeletal: Normal range of motion.   Neurological: She is alert and oriented to person, place, and time. She has normal reflexes.   Skin: Skin is warm and dry.       Assessment:       1. Drainage from wound    2. Abdominal wall abscess           Wound 10/31/18 0836 Abscess medial Abdomen #1 (Active)   10/31/18 0836    Pre-existing: Yes   Primary Wound Type: Abscess   Side:    Orientation: medial   Location: Abdomen   Wound/PI Number (optional): #1   Ankle-Brachial Index:    Pulses:    Removal Indication and Assessment:    Wound Outcome:    (Retired) Wound Type:    (Retired) Wound Length (cm):    (Retired) Wound Width (cm):    (Retired) Depth (cm):    Wound Description (Comments):    Removal Indications:    Dressing Appearance Saturated 1/23/2019  8:46 AM   Drainage Amount Moderate 1/23/2019  8:46 AM   Drainage Characteristics/Odor Brown;Creamy;Serosanguineous;Purulent 1/23/2019  8:46 AM   Appearance Pink;Red;Wet;Yellow;Fibrin 1/23/2019  8:46 AM   Tissue loss description Partial thickness 1/23/2019  8:46 AM   Red (%), Wound Tissue Color 80 % 1/23/2019  8:46 AM   Yellow (%), Wound Tissue Color 20 % 1/23/2019  8:46 AM   Periwound Area Intact;Dry 1/23/2019  8:46 AM   Wound Edges Approximated 1/23/2019  8:46 AM   Wound Length (cm) 0.5 cm 1/23/2019  8:46 AM   Wound Width (cm) 0.5 cm 1/23/2019  8:46 AM   Wound Depth (cm) 5 cm 1/23/2019  8:46 AM   Wound Volume (cm^3) 1.25 cm^3 1/23/2019  8:46 AM   Wound Surface Area (cm^2) 0.25 cm^2 1/23/2019  8:46 AM   Care Cleansed with:;Sterile normal saline 1/23/2019  8:46 AM   Dressing  Applied;Island/border 1/23/2019  8:46 AM   Periwound Care Skin barrier film applied 1/23/2019  8:46 AM   Dressing Change Due 01/25/19 1/23/2019  8:46 AM         Wound #1 Anterior Abdomen - midline  Other Orders  Dressings & Wound Care  Cleanse wound with Normal Saline. - Cavion or benzoin to periwound   betamethasone periwoind prn itching.  Antimicrobial Ointment/Cream. - Gentamycin to wound bed.  Other: - Apply iodoform gauze over wound, cover with aquacel extra or 4x4 gauze, secure with 5x5 aqacel border and mefix tape.   Do not probe depth of wound  Change dressing  Thursday 01/24/2019 per , (patient scheduled for surgery 01/25/2019)    Plan:                Follow-up in about 1 week (around 1/30/2019).  Surgery 01/25/2019 with Dr. Horton.  Wound cultures results pending, cultures sent to lab/micro 1/23/2019.  Follow Dr. Horton's orders including (diet, antibiotic terapy and other medications starting 1/24/2019).

## 2019-01-24 ENCOUNTER — HOSPITAL ENCOUNTER (INPATIENT)
Facility: HOSPITAL | Age: 69
LOS: 1 days | Discharge: HOME | DRG: 389 | End: 2019-01-25
Attending: EMERGENCY MEDICINE | Admitting: SURGERY
Payer: MEDICARE

## 2019-01-24 DIAGNOSIS — Z93.3 STATUS POST HARTMANN'S PROCEDURE: ICD-10-CM

## 2019-01-24 DIAGNOSIS — K56.609 SMALL BOWEL OBSTRUCTION: ICD-10-CM

## 2019-01-24 DIAGNOSIS — I10 ESSENTIAL HYPERTENSION: ICD-10-CM

## 2019-01-24 DIAGNOSIS — Z90.49 HISTORY OF HEMICOLECTOMY: ICD-10-CM

## 2019-01-24 DIAGNOSIS — K56.50 SMALL BOWEL OBSTRUCTION DUE TO ADHESIONS: ICD-10-CM

## 2019-01-24 DIAGNOSIS — R10.9 ABDOMINAL PAIN: ICD-10-CM

## 2019-01-24 DIAGNOSIS — R10.84 GENERALIZED ABDOMINAL PAIN: Primary | ICD-10-CM

## 2019-01-24 LAB
ALBUMIN SERPL BCP-MCNC: 3.2 G/DL
ALP SERPL-CCNC: 84 U/L
ALT SERPL W/O P-5'-P-CCNC: 12 U/L
ANION GAP SERPL CALC-SCNC: 11 MMOL/L
AST SERPL-CCNC: 13 U/L
BACTERIA #/AREA URNS HPF: ABNORMAL /HPF
BASOPHILS # BLD AUTO: 0.01 K/UL
BASOPHILS NFR BLD: 0.1 %
BILIRUB SERPL-MCNC: 0.4 MG/DL
BILIRUB UR QL STRIP: NEGATIVE
BUN SERPL-MCNC: 14 MG/DL
CALCIUM SERPL-MCNC: 9.2 MG/DL
CHLORIDE SERPL-SCNC: 97 MMOL/L
CLARITY UR: CLEAR
CO2 SERPL-SCNC: 27 MMOL/L
COLOR UR: YELLOW
CREAT SERPL-MCNC: 1.3 MG/DL
DIFFERENTIAL METHOD: ABNORMAL
EOSINOPHIL # BLD AUTO: 0.1 K/UL
EOSINOPHIL NFR BLD: 0.9 %
ERYTHROCYTE [DISTWIDTH] IN BLOOD BY AUTOMATED COUNT: 15.2 %
EST. GFR  (AFRICAN AMERICAN): 49 ML/MIN/1.73 M^2
EST. GFR  (NON AFRICAN AMERICAN): 43 ML/MIN/1.73 M^2
ESTIMATED AVG GLUCOSE: 192 MG/DL
ESTIMATED AVG GLUCOSE: 192 MG/DL
GLUCOSE SERPL-MCNC: 208 MG/DL
GLUCOSE UR QL STRIP: NEGATIVE
HBA1C MFR BLD HPLC: 8.3 %
HBA1C MFR BLD HPLC: 8.3 %
HCT VFR BLD AUTO: 31.4 %
HGB BLD-MCNC: 10.1 G/DL
HGB UR QL STRIP: ABNORMAL
HYALINE CASTS #/AREA URNS LPF: 0 /LPF
KETONES UR QL STRIP: NEGATIVE
LEUKOCYTE ESTERASE UR QL STRIP: NEGATIVE
LIPASE SERPL-CCNC: 26 U/L
LYMPHOCYTES # BLD AUTO: 1.6 K/UL
LYMPHOCYTES NFR BLD: 13.9 %
MCH RBC QN AUTO: 24.8 PG
MCHC RBC AUTO-ENTMCNC: 32.2 G/DL
MCV RBC AUTO: 77 FL
MICROSCOPIC COMMENT: ABNORMAL
MONOCYTES # BLD AUTO: 0.6 K/UL
MONOCYTES NFR BLD: 5.2 %
NEUTROPHILS # BLD AUTO: 9.1 K/UL
NEUTROPHILS NFR BLD: 79.7 %
NITRITE UR QL STRIP: NEGATIVE
PH UR STRIP: 6 [PH] (ref 5–8)
PLATELET # BLD AUTO: 381 K/UL
PMV BLD AUTO: 8.2 FL
POCT GLUCOSE: 189 MG/DL (ref 70–110)
POTASSIUM SERPL-SCNC: 3.5 MMOL/L
PROT SERPL-MCNC: 8.4 G/DL
PROT UR QL STRIP: ABNORMAL
RBC # BLD AUTO: 4.08 M/UL
RBC #/AREA URNS HPF: 0 /HPF (ref 0–4)
SODIUM SERPL-SCNC: 135 MMOL/L
SP GR UR STRIP: 1.02 (ref 1–1.03)
SQUAMOUS #/AREA URNS HPF: 3 /HPF
URN SPEC COLLECT METH UR: ABNORMAL
UROBILINOGEN UR STRIP-ACNC: NEGATIVE EU/DL
WBC # BLD AUTO: 11.36 K/UL
WBC #/AREA URNS HPF: 2 /HPF (ref 0–5)

## 2019-01-24 PROCEDURE — 81000 URINALYSIS NONAUTO W/SCOPE: CPT

## 2019-01-24 PROCEDURE — 96374 THER/PROPH/DIAG INJ IV PUSH: CPT

## 2019-01-24 PROCEDURE — 99285 EMERGENCY DEPT VISIT HI MDM: CPT | Mod: 25

## 2019-01-24 PROCEDURE — 36415 COLL VENOUS BLD VENIPUNCTURE: CPT

## 2019-01-24 PROCEDURE — 80053 COMPREHEN METABOLIC PANEL: CPT

## 2019-01-24 PROCEDURE — 83036 HEMOGLOBIN GLYCOSYLATED A1C: CPT

## 2019-01-24 PROCEDURE — 25000003 PHARM REV CODE 250: Performed by: EMERGENCY MEDICINE

## 2019-01-24 PROCEDURE — 83690 ASSAY OF LIPASE: CPT

## 2019-01-24 PROCEDURE — 93005 ELECTROCARDIOGRAM TRACING: CPT

## 2019-01-24 PROCEDURE — 82962 GLUCOSE BLOOD TEST: CPT

## 2019-01-24 PROCEDURE — 11000001 HC ACUTE MED/SURG PRIVATE ROOM

## 2019-01-24 PROCEDURE — 96375 TX/PRO/DX INJ NEW DRUG ADDON: CPT

## 2019-01-24 PROCEDURE — 96361 HYDRATE IV INFUSION ADD-ON: CPT

## 2019-01-24 PROCEDURE — 63600175 PHARM REV CODE 636 W HCPCS: Performed by: EMERGENCY MEDICINE

## 2019-01-24 PROCEDURE — 85025 COMPLETE CBC W/AUTO DIFF WBC: CPT

## 2019-01-24 RX ORDER — MORPHINE SULFATE 2 MG/ML
2 INJECTION, SOLUTION INTRAMUSCULAR; INTRAVENOUS EVERY 4 HOURS PRN
Status: DISCONTINUED | OUTPATIENT
Start: 2019-01-24 | End: 2019-01-25 | Stop reason: HOSPADM

## 2019-01-24 RX ORDER — SODIUM CHLORIDE 0.9 % (FLUSH) 0.9 %
5 SYRINGE (ML) INJECTION
Status: DISCONTINUED | OUTPATIENT
Start: 2019-01-24 | End: 2019-01-25 | Stop reason: HOSPADM

## 2019-01-24 RX ORDER — ONDANSETRON 2 MG/ML
4 INJECTION INTRAMUSCULAR; INTRAVENOUS EVERY 8 HOURS PRN
Status: DISCONTINUED | OUTPATIENT
Start: 2019-01-24 | End: 2019-01-25 | Stop reason: HOSPADM

## 2019-01-24 RX ORDER — GLUCAGON 1 MG
1 KIT INJECTION
Status: DISCONTINUED | OUTPATIENT
Start: 2019-01-24 | End: 2019-01-25 | Stop reason: HOSPADM

## 2019-01-24 RX ORDER — SODIUM CHLORIDE, SODIUM LACTATE, POTASSIUM CHLORIDE, CALCIUM CHLORIDE 600; 310; 30; 20 MG/100ML; MG/100ML; MG/100ML; MG/100ML
INJECTION, SOLUTION INTRAVENOUS CONTINUOUS
Status: DISCONTINUED | OUTPATIENT
Start: 2019-01-24 | End: 2019-01-25 | Stop reason: HOSPADM

## 2019-01-24 RX ORDER — ACETAMINOPHEN 325 MG/1
650 TABLET ORAL EVERY 6 HOURS PRN
Status: DISCONTINUED | OUTPATIENT
Start: 2019-01-24 | End: 2019-01-25 | Stop reason: HOSPADM

## 2019-01-24 RX ORDER — MORPHINE SULFATE 2 MG/ML
4 INJECTION, SOLUTION INTRAMUSCULAR; INTRAVENOUS EVERY 4 HOURS PRN
Status: DISCONTINUED | OUTPATIENT
Start: 2019-01-24 | End: 2019-01-25 | Stop reason: HOSPADM

## 2019-01-24 RX ORDER — MORPHINE SULFATE 2 MG/ML
2 INJECTION, SOLUTION INTRAMUSCULAR; INTRAVENOUS
Status: COMPLETED | OUTPATIENT
Start: 2019-01-24 | End: 2019-01-24

## 2019-01-24 RX ORDER — INSULIN ASPART 100 [IU]/ML
0-5 INJECTION, SOLUTION INTRAVENOUS; SUBCUTANEOUS EVERY 6 HOURS PRN
Status: DISCONTINUED | OUTPATIENT
Start: 2019-01-24 | End: 2019-01-25 | Stop reason: HOSPADM

## 2019-01-24 RX ORDER — METOCLOPRAMIDE HYDROCHLORIDE 5 MG/ML
10 INJECTION INTRAMUSCULAR; INTRAVENOUS EVERY 6 HOURS PRN
Status: DISCONTINUED | OUTPATIENT
Start: 2019-01-24 | End: 2019-01-25 | Stop reason: HOSPADM

## 2019-01-24 RX ORDER — PROCHLORPERAZINE EDISYLATE 5 MG/ML
10 INJECTION INTRAMUSCULAR; INTRAVENOUS
Status: COMPLETED | OUTPATIENT
Start: 2019-01-24 | End: 2019-01-24

## 2019-01-24 RX ADMIN — MORPHINE SULFATE 2 MG: 2 INJECTION, SOLUTION INTRAMUSCULAR; INTRAVENOUS at 07:01

## 2019-01-24 RX ADMIN — SODIUM CHLORIDE, SODIUM LACTATE, POTASSIUM CHLORIDE, AND CALCIUM CHLORIDE: .6; .31; .03; .02 INJECTION, SOLUTION INTRAVENOUS at 11:01

## 2019-01-24 RX ADMIN — PROCHLORPERAZINE EDISYLATE 10 MG: 5 INJECTION INTRAMUSCULAR; INTRAVENOUS at 07:01

## 2019-01-24 RX ADMIN — SODIUM CHLORIDE 1000 ML: 0.9 INJECTION, SOLUTION INTRAVENOUS at 07:01

## 2019-01-25 VITALS
DIASTOLIC BLOOD PRESSURE: 80 MMHG | RESPIRATION RATE: 18 BRPM | HEIGHT: 62 IN | TEMPERATURE: 97 F | BODY MASS INDEX: 31.24 KG/M2 | WEIGHT: 169.75 LBS | SYSTOLIC BLOOD PRESSURE: 168 MMHG | HEART RATE: 74 BPM | OXYGEN SATURATION: 99 %

## 2019-01-25 LAB
ALBUMIN SERPL BCP-MCNC: 2.6 G/DL
ALP SERPL-CCNC: 67 U/L
ALT SERPL W/O P-5'-P-CCNC: 10 U/L
ANION GAP SERPL CALC-SCNC: 8 MMOL/L
AST SERPL-CCNC: 11 U/L
BASOPHILS # BLD AUTO: 0.01 K/UL
BASOPHILS NFR BLD: 0.1 %
BILIRUB SERPL-MCNC: 0.3 MG/DL
BUN SERPL-MCNC: 12 MG/DL
CALCIUM SERPL-MCNC: 8.5 MG/DL
CHLORIDE SERPL-SCNC: 104 MMOL/L
CO2 SERPL-SCNC: 26 MMOL/L
CREAT SERPL-MCNC: 1.1 MG/DL
DIFFERENTIAL METHOD: ABNORMAL
EOSINOPHIL # BLD AUTO: 0.2 K/UL
EOSINOPHIL NFR BLD: 2 %
ERYTHROCYTE [DISTWIDTH] IN BLOOD BY AUTOMATED COUNT: 15.3 %
EST. GFR  (AFRICAN AMERICAN): >60 ML/MIN/1.73 M^2
EST. GFR  (NON AFRICAN AMERICAN): 52 ML/MIN/1.73 M^2
GLUCOSE SERPL-MCNC: 130 MG/DL
HCT VFR BLD AUTO: 28.6 %
HGB BLD-MCNC: 8.9 G/DL
LYMPHOCYTES # BLD AUTO: 2.1 K/UL
LYMPHOCYTES NFR BLD: 25.3 %
MCH RBC QN AUTO: 24.2 PG
MCHC RBC AUTO-ENTMCNC: 31.1 G/DL
MCV RBC AUTO: 78 FL
MONOCYTES # BLD AUTO: 0.6 K/UL
MONOCYTES NFR BLD: 7.2 %
NEUTROPHILS # BLD AUTO: 5.4 K/UL
NEUTROPHILS NFR BLD: 65.3 %
PLATELET # BLD AUTO: 332 K/UL
PMV BLD AUTO: 8 FL
POCT GLUCOSE: 106 MG/DL (ref 70–110)
POCT GLUCOSE: 136 MG/DL (ref 70–110)
POCT GLUCOSE: 149 MG/DL (ref 70–110)
POCT GLUCOSE: 150 MG/DL (ref 70–110)
POTASSIUM SERPL-SCNC: 3.7 MMOL/L
PROT SERPL-MCNC: 7.1 G/DL
RBC # BLD AUTO: 3.68 M/UL
SODIUM SERPL-SCNC: 138 MMOL/L
WBC # BLD AUTO: 8.34 K/UL

## 2019-01-25 PROCEDURE — 25000003 PHARM REV CODE 250: Performed by: EMERGENCY MEDICINE

## 2019-01-25 PROCEDURE — 63600175 PHARM REV CODE 636 W HCPCS: Performed by: EMERGENCY MEDICINE

## 2019-01-25 PROCEDURE — 36415 COLL VENOUS BLD VENIPUNCTURE: CPT

## 2019-01-25 PROCEDURE — 80053 COMPREHEN METABOLIC PANEL: CPT

## 2019-01-25 PROCEDURE — 94761 N-INVAS EAR/PLS OXIMETRY MLT: CPT

## 2019-01-25 PROCEDURE — 85025 COMPLETE CBC W/AUTO DIFF WBC: CPT

## 2019-01-25 RX ORDER — ACETAMINOPHEN 325 MG/1
650 TABLET ORAL EVERY 6 HOURS PRN
Refills: 0 | Status: ON HOLD | COMMUNITY
Start: 2019-01-25 | End: 2019-02-28 | Stop reason: HOSPADM

## 2019-01-25 RX ADMIN — MORPHINE SULFATE 2 MG: 2 INJECTION, SOLUTION INTRAMUSCULAR; INTRAVENOUS at 06:01

## 2019-01-25 RX ADMIN — SODIUM CHLORIDE, SODIUM LACTATE, POTASSIUM CHLORIDE, AND CALCIUM CHLORIDE: .6; .31; .03; .02 INJECTION, SOLUTION INTRAVENOUS at 02:01

## 2019-01-25 RX ADMIN — MORPHINE SULFATE 2 MG: 2 INJECTION, SOLUTION INTRAMUSCULAR; INTRAVENOUS at 02:01

## 2019-01-25 RX ADMIN — SODIUM CHLORIDE, SODIUM LACTATE, POTASSIUM CHLORIDE, AND CALCIUM CHLORIDE: .6; .31; .03; .02 INJECTION, SOLUTION INTRAVENOUS at 06:01

## 2019-01-25 RX ADMIN — ONDANSETRON 4 MG: 2 INJECTION INTRAMUSCULAR; INTRAVENOUS at 02:01

## 2019-01-25 NOTE — ED PROVIDER NOTES
Encounter Date: 2019       History     Chief Complaint   Patient presents with    Abdominal Pain     abdominal pain, has not had a BM today, and nausea, and vomiting.  Patient was scheduled a surgery tomorrow with Dr. Horton and cancelled because of low potassium.     HPI  Review of patient's allergies indicates:   Allergen Reactions    Chlorhexidine gluconate Rash     Chlorhexidine/alcohol wipes. Rash and blistering.     Past Medical History:   Diagnosis Date    Diabetes mellitus     Diabetes mellitus, type 2     Hypertension      Past Surgical History:   Procedure Laterality Date     SECTION, CLASSIC      x2    CHOLECYSTECTOMY      CLOSURE-COLOSTOMY N/A 2016    [facility]    COLON SURGERY      [facility]    COLONOSCOPY N/A 2018    [facility] ENDO    COLOSTOMY Left     DEBRIDEMENT-WOUND-ABDOMINAL WALL N/A 2017    [facility]    EXPLORATORY-LAPAROTOMY N/A 2016    [facility]    INCISION, ABDOMINAL WALL N/A 8/10/2018    [facility]    INCISIONAL HERNIA REPAIR Right     LYSIS-ADHESION N/A 2016    [facility]    RESECTION - SMALL BOWEL and fistula resection  N/A 2016    [facility]    WOUND EXPLORATION N/A 2016    [facility]     History reviewed. No pertinent family history.  Social History     Tobacco Use    Smoking status: Never Smoker    Smokeless tobacco: Never Used   Substance Use Topics    Alcohol use: No    Drug use: No     Review of Systems    Physical Exam     Initial Vitals [19 1810]   BP Pulse Resp Temp SpO2   137/81 100 16 98.6 °F (37 °C) 100 %      MAP       --         Physical Exam    ED Course   Procedures  Labs Reviewed   CBC W/  AUTO DIFFERENTIAL - Abnormal; Notable for the following components:       Result Value    Hemoglobin 10.1 (*)     Hematocrit 31.4 (*)     MCV 77 (*)     MCH 24.8 (*)     RDW 15.2 (*)     Platelets 381 (*)     MPV 8.2 (*)     Gran # (ANC) 9.1 (*)     Gran% 79.7 (*)     Lymph% 13.9 (*)     All other components within normal limits   COMPREHENSIVE METABOLIC PANEL - Abnormal; Notable for the following components:    Sodium 135 (*)     Glucose 208 (*)     Albumin 3.2 (*)     eGFR if  49 (*)     eGFR if non  43 (*)     All other components within normal limits   URINALYSIS, REFLEX TO URINE CULTURE - Abnormal; Notable for the following components:    Protein, UA 1+ (*)     Occult Blood UA Trace (*)     All other components within normal limits    Narrative:     Preferred Collection Type->Urine, Clean Catch   URINALYSIS MICROSCOPIC - Abnormal; Notable for the following components:    Bacteria, UA Few (*)     All other components within normal limits    Narrative:     Preferred Collection Type->Urine, Clean Catch   POCT GLUCOSE - Abnormal; Notable for the following components:    POCT Glucose 189 (*)     All other components within normal limits   LIPASE   POCT GLUCOSE MONITORING CONTINUOUS          Imaging Results          CT Abdomen Pelvis  Without Contrast (Final result)     Abnormal  Result time 01/24/19 19:55:31    Final result by Chiquis Page MD (01/24/19 19:55:31)                 Impression:      Cluster of decompressed matted appearing small bowel in the upper pelvis.  The possibility of adhesions cannot be entirely excluded or fistulous communications.    Hepatic steatosis.    Some flattening of the IVC.  Assess fluid hydration status.    Colonic diverticulosis.  No definite evidence for acute diverticulitis.    Small hiatal hernia.    Stable micronodules seen at the lung bases bilaterally.  Recommend follow-up using the Fleischner criteria.    This report was flagged in Epic  as abnormal.      Electronically signed by: Chiquis Page  Date:    01/24/2019  Time:    19:55             Narrative:    EXAMINATION:  CT ABDOMEN PELVIS WITHOUT CONTRAST    CLINICAL HISTORY:  Abd pain, gastroenteritis or colitis suspected;    TECHNIQUE:  Low dose axial images, sagittal and coronal reformations were obtained from the lung bases to the pubic symphysis.    COMPARISON:  12/04/2018    FINDINGS:  Within the limits of a noncontrast examination, there is mild fatty infiltration of the liver.  The spleen, pancreas, adrenal glands, and right kidney are unremarkable.  The gallbladder is surgically absent.    There is a left renal cyst.    There is some flattening of the IVC.  There are dense vascular calcifications.  There is no abdominal adenopathy or ascites.    There is evidence of multiple small bowel resections.  There is some distension at the anastomotic sites  of small bowel.  An anastomosis deep to the umbilicus contains stool and is dilated up to 5.7 cm.  There is a cluster of decompressed small bowel in the pelvis, which again appears matted appearance.    At the lung bases, there is a small hiatal hernia.  Micronodules measuring less than 6 mm are seen at the lung bases (series 2 axial image 2, 7, 10, 24).                               X-Ray Abdomen Flat And Erect (Final result)  Result time 01/24/19 18:54:03    Final result by Cheko Sapp MD (01/24/19 18:54:03)                 Impression:      1. Moderate amount of stool in the colon, suggesting etiology for slow flow through small bowel loops is on the basis of developing constipation although clinical correlation is advised in this postsurgical patient.  Differential would include the possibility of developing partial small bowel obstruction on the basis of adhesion.      Electronically signed by: Cheko Sapp MD  Date:    01/24/2019  Time:    18:54             Narrative:    EXAMINATION:  XR ABDOMEN FLAT AND ERECT    CLINICAL  HISTORY:  Unspecified abdominal pain    TECHNIQUE:  Flat and erect AP views of the abdomen were performed.    COMPARISON:  12/12/2018    FINDINGS:  One upright view, 2 supine views.    There are a few small bowel air-fluid levels on upright view, in nondilated bowel loops.  Air and stool is seen within the large bowel and projected over the rectum.  There is moderate to large amount of stool in the colon.  Postsurgical changes of the bowel are noted on the right.  No large volume free air or pneumatosis.  There are no calcifications to suggest nephrolithiasis or cholelithiasis.  Postsurgical changes are noted of the right upper quadrant.  The lower lung zones are grossly clear.  There is vascular calcification.  The osseous structures are grossly intact.                                                   ED Course as of Jan 24 2047   Thu Jan 24, 2019   1811 Sort note: Azucena MENDOZA Morrison nontoxic/afebrile 68 y.o.  presented to the ED with c/o abd pain.     Patient seen and medically screened by Nurse Practitioner in Sort process due to ED crowding.  Appropriate tests and/or medications ordered.  Care transferred to an alternate provider when patient was placed in an Exam Room from the [facility] for physical exam, additional orders, and disposition.  Gaby FNP   [JN]      ED Course User Index  [JN] Trino Aggarwal NP     Clinical Impression:     1. Generalized abdominal pain    2. Abdominal pain    3. Small bowel obstruction due to adhesions    4. Small bowel obstruction              Scribe attestation***

## 2019-01-25 NOTE — PROGRESS NOTES
Progress notes reviewed. Evening rounds completed.Admit assessment and Med Rec completed . Patient is Yoruba speaking . Introduced self as  Bruneian speaking translating VN for this shift. Educated pt on VN's role in patient's care.  Plan of care reviewed with patient. Opportunity given for pt's questions. No questions or concerns expressed at this time. VN to continue to monitor.

## 2019-01-25 NOTE — PLAN OF CARE
Abd wound with MDRO - ESBL in Aug.  Please place on contact isolation per protocol. Contact Infection Control @ [phone] for questions.

## 2019-01-25 NOTE — NURSING
VN called from patient's room.  Keiry Jonas RN, OC in room.  She states that the patient is asking about the possibility of eating since OR was cancelled.  I let them know that I attempted to call Dr. Horton, but had to leave a message.  Also that I had read his notes for today, and that the plan noted he was going to keep her NPO, with IV fluids.  Ms. Jonas translated and conveyed this to the patient who verbalized understanding.  Patient denies any other questions, concerns or needs at this time.

## 2019-01-25 NOTE — H&P
Chief Complaint   Patient presents with    Abdominal Pain       abdominal pain, has not had a BM today, and nausea, and vomiting.  Patient was scheduled a surgery today with and cancelled because of low potassium. And feeling weak.      HPI        Review of patient's allergies indicates:   Allergen Reactions    Chlorhexidine gluconate Rash       Chlorhexidine/alcohol wipes. Rash and blistering.           Past Medical History:   Diagnosis Date    Diabetes mellitus      Diabetes mellitus, type 2      Hypertension              Past Surgical History:   Procedure Laterality Date     SECTION, CLASSIC         x2    CHOLECYSTECTOMY       CLOSURE-COLOSTOMY N/A 2016     [facility]    COLON SURGERY        [facility]    COLONOSCOPY N/A 2018     [facility] ENDO    COLOSTOMY Left     DEBRIDEMENT-WOUND-ABDOMINAL WALL N/A 2017     [facility]    EXPLORATORY-LAPAROTOMY N/A 2016     [facility]    INCISION, ABDOMINAL WALL N/A 8/10/2018     [facility]    INCISIONAL HERNIA REPAIR Right     LYSIS-ADHESION N/A 2016     [facility]    RESECTION - SMALL BOWEL and fistula resection  N/A 2016     [facility]    WOUND EXPLORATION N/A 2016     [facility]      History reviewed. No pertinent family history.  Social History           Tobacco Use    Smoking status: Never Smoker    Smokeless tobacco: Never Used   Substance Use Topics    Alcohol use: No    Drug use: No      Review of Systems     Physical Exam             Initial Vitals [19 1810]   BP Pulse Resp Temp SpO2   137/81 100 16 98.6 °F (37 °C) 100 %       MAP           --              Physical Exam     ED Course    Procedures        Labs Reviewed   CBC W/ AUTO DIFFERENTIAL - Abnormal; Notable for the following components:       Result Value      Hemoglobin 10.1 (*)       Hematocrit 31.4 (*)       MCV 77 (*)       MCH 24.8 (*)       RDW 15.2 (*)       Platelets 381 (*)       MPV 8.2 (*)       Gran # (ANC) 9.1 (*)       Gran% 79.7 (*)       Lymph% 13.9 (*)       All other components within normal limits   COMPREHENSIVE METABOLIC PANEL - Abnormal; Notable for the following components:     Sodium 135 (*)       Glucose 208 (*)       Albumin 3.2 (*)       eGFR if  49 (*)       eGFR if non  43 (*)       All other components within normal limits   URINALYSIS, REFLEX TO URINE CULTURE - Abnormal; Notable for the following components:     Protein, UA 1+ (*)       Occult Blood UA Trace (*)       All other components within normal limits     Narrative:      Preferred Collection Type->Urine, Clean Catch   URINALYSIS MICROSCOPIC - Abnormal; Notable for the following components:     Bacteria, UA Few (*)       All other components within normal limits     Narrative:      Preferred Collection Type->Urine, Clean Catch   POCT GLUCOSE - Abnormal; Notable for the following components:     POCT Glucose 189 (*)       All other components within normal limits   LIPASE   POCT GLUCOSE MONITORING CONTINUOUS             Imaging Results                   CT Abdomen Pelvis  Without Contrast (Final result)     Abnormal  Result time 01/24/19 19:55:31                Final result by Chiquis Page MD (01/24/19 19:55:31)                               Impression:        Cluster of decompressed matted appearing small bowel in the upper pelvis.  The possibility of adhesions cannot be entirely excluded or fistulous communications.     Hepatic steatosis.     Some flattening of the IVC.  Assess fluid hydration status.     Colonic diverticulosis.  No definite evidence for acute diverticulitis.     Small hiatal hernia.     Stable  micronodules seen at the lung bases bilaterally.  Recommend follow-up using the Fleischner criteria.     This report was flagged in Epic as abnormal.        Electronically signed by:       Chiquis Page  Date:                                                01/24/2019  Time:                                                19:55                         Narrative:     EXAMINATION:  CT ABDOMEN PELVIS WITHOUT CONTRAST     CLINICAL HISTORY:  Abd pain, gastroenteritis or colitis suspected;     TECHNIQUE:  Low dose axial images, sagittal and coronal reformations were obtained from the lung bases to the pubic symphysis.     COMPARISON:  12/04/2018     FINDINGS:  Within the limits of a noncontrast examination, there is mild fatty infiltration of the liver.  The spleen, pancreas, adrenal glands, and right kidney are unremarkable.  The gallbladder is surgically absent.     There is a left renal cyst.     There is some flattening of the IVC.  There are dense vascular calcifications.  There is no abdominal adenopathy or ascites.     There is evidence of multiple small bowel resections.  There is some distension at the anastomotic sites  of small bowel.  An anastomosis deep to the umbilicus contains stool and is dilated up to 5.7 cm.  There is a cluster of decompressed small bowel in the pelvis, which again appears matted appearance.     At the lung bases, there is a small hiatal hernia.  Micronodules measuring less than 6 mm are seen at the lung bases (series 2 axial image 2, 7, 10, 24).                                                  X-Ray Abdomen Flat And Erect (Final result)  Result time 01/24/19 18:54:03                Final result by Cheko Sapp MD (01/24/19 18:54:03)                               Impression:        1. Moderate amount of stool in the colon, suggesting etiology for slow flow through small bowel loops is on the basis of developing constipation although clinical correlation is advised in this  postsurgical patient.  Differential would include the possibility of developing partial small bowel obstruction on the basis of adhesion.        Electronically signed by:       Cheko Sapp MD  Date:                                                01/24/2019  Time:                                                18:54                  Imp: Small bowel Obstruction, DM ,HTN,obesity, s/o colon resection    Plan: npo , iv fluids , medical f/u

## 2019-01-25 NOTE — PLAN OF CARE
This  put name on white board and explained blue discharge folder to patient. Discharge planning brochure and/or business card given to patient.  Patient verbalized understanding.    TN met with patient. She states that prior to arrival she lived at home. Pt states that son lives with her. She is independent and able to drive herself to and from appointments. Son will bring home on discharge from hospital. Pt has had [facility] in the past. No DME at home. Will monitor.    Future Appointments   Date Time Provider Department Center   1/30/2019  8:30 AM Ulisses Horton MD [facility] W[facility]        01/25/19 1614   Discharge Assessment   Assessment Type Discharge Planning Assessment   Confirmed/corrected address and phone number on facesheet? Yes   Assessment information obtained from? Patient;Medical Record   Expected Length of Stay (days) 3   Communicated expected length of stay with patient/caregiver yes   Prior to hospitilization cognitive status: Alert/Oriented   Prior to hospitalization functional status: Independent   Current cognitive status: Alert/Oriented   Current Functional Status: Independent   Lives With child(felicitas), adult   Able to Return to Prior Arrangements yes   Is patient able to care for self after discharge? Yes   Who are your caregiver(s) and their phone number(s)? Andry (son) [phone]   Patient's perception of discharge disposition home or selfcare   Readmission Within the Last 30 Days no previous admission in last 30 days   Patient currently being followed by outpatient case management? No   Equipment Currently Used at Home none   Do you have any problems affording any of your prescribed medications? No   Is the patient taking medications as prescribed? yes   Does the patient have transportation home? Yes   Transportation Anticipated family or friend will provide   Does the patient receive services at the [facility]? No   Discharge Plan A Home;Home with family;Home Health    Discharge Plan B Home;Home with family   DME Needed Upon Discharge  none   Patient/Family in Agreement with Plan yes

## 2019-01-25 NOTE — ED PROVIDER NOTES
Encounter Date: 2019    SCRIBE #1 NOTE: I, Brenton Gutierrez, am scribing for, and in the presence of,  Dr. Cordova. I have scribed the entire note.       History     Chief Complaint   Patient presents with    Abdominal Pain     abdominal pain, has not had a BM today, and nausea, and vomiting.  Patient was scheduled a surgery tomorrow with Dr. Horton and cancelled because of low potassium.     This is a 68 y.o. female who presents with complaint of abdominal pain beginning yesterday afternoon. Her pain was intermittent and mild yesterday, but her pain has become more severe and is constant today. Patient is unable to describe the pain, but it is worst in the upper abdomen, and is worse with palpation and ambulation. No alleviating factors reported. She reports nausea today without emesis, and she has not had a BM in the last 24 hours, which is unusual for her given her partial colectomy. She also reports a mild HA, generalized, throbbing, without alleviating factors, which began this afternoon as well. Patient denies any fever, chills, diarrhea, dysuria, hematuria, urinary frequency, or any other symptoms. She states she has a surgery scheduled tomorrow with Dr. Cox, which was cancelled due to her low potassium. Patient has a prior history of 80 percent colectomy.  Family acted as  for the patient; a formal  was offered but declined      The history is provided by the patient and a relative. The history is limited by a language barrier. A  was used.     Review of patient's allergies indicates:   Allergen Reactions    Chlorhexidine gluconate Rash     Chlorhexidine/alcohol wipes. Rash and blistering.     Past Medical History:   Diagnosis Date    Diabetes mellitus     Diabetes mellitus, type 2     Hypertension      Past Surgical History:   Procedure Laterality Date     SECTION, CLASSIC      x2    CHOLECYSTECTOMY      CLOSURE-COLOSTOMY N/A 2016     [facility]    COLON SURGERY  2015    \[facility] [place] [facility]\""    COLONOSCOPY N/A 6/4/2018    [facility] at [facility] ENDO    COLOSTOMY Left 2015    DEBRIDEMENT-WOUND-ABDOMINAL WALL N/A 7/11/2017    [facility]    EXPLORATORY-LAPAROTOMY N/A 11/14/2016    [facility]    INCISION, ABDOMINAL WALL N/A 8/10/2018    [facility]    INCISIONAL HERNIA REPAIR Right 2010    LYSIS-ADHESION N/A 11/14/2016    [facility]    RESECTION - SMALL BOWEL and fistula resection  N/A 11/14/2016    [facility]    WOUND EXPLORATION N/A 8/2/2016    [facility]     History reviewed. No pertinent family history.  Social History     Tobacco Use    Smoking status: Never Smoker    Smokeless tobacco: Never Used   Substance Use Topics    Alcohol use: No    Drug use: No     Review of Systems   Constitutional: Negative for chills, fatigue and fever.   HENT: Negative for facial swelling, trouble swallowing and voice change.    Eyes: Negative for photophobia, pain and redness.   Respiratory: Negative for cough, choking and shortness of breath.    Cardiovascular: Negative for chest pain, palpitations and leg swelling.   Gastrointestinal: Positive for abdominal pain and nausea. Negative for diarrhea and vomiting.   Genitourinary: Negative for dysuria, frequency and urgency.   Musculoskeletal: Negative for back pain, neck pain and neck stiffness.   Neurological: Positive for headaches. Negative for seizures, speech difficulty, light-headedness and numbness.   All other systems reviewed and are negative.    Physical Exam     Initial Vitals [01/24/19 1810]   BP Pulse Resp Temp SpO2   137/81 100 16 98.6 °F (37 °C) 100 %      MAP       --         Physical Exam    Nursing note and vitals  reviewed.  Constitutional: She appears well-developed and well-nourished. She appears distressed (Mild discomfort).   HENT:   Head: Normocephalic and atraumatic.   Mouth/Throat: Mucous membranes are dry.   Dry mucous membranes; Oropharynx clear   Eyes: Conjunctivae and EOM are normal. Pupils are equal, round, and reactive to light.   Neck: Normal range of motion. Neck supple. No tracheal deviation present.   Cardiovascular: Normal rate, regular rhythm, normal heart sounds and intact distal pulses.   Pulmonary/Chest: Breath sounds normal. No respiratory distress. She has no wheezes. She has no rhonchi. She has no rales.   Abdominal: Soft. She exhibits no distension. There is tenderness. There is no rebound and no guarding.   Diffuse TTP, worse to the epigastrium  Abdominal fullness  Hyperactive bowel sounds   Musculoskeletal: Normal range of motion. She exhibits no edema or tenderness.   Neurological: She is alert and oriented to person, place, and time. She has normal strength. No cranial nerve deficit or sensory deficit. GCS score is 15. GCS eye subscore is 4. GCS verbal subscore is 5. GCS motor subscore is 6.   Skin: Skin is warm and dry. Capillary refill takes less than 2 seconds.       ED Course   Procedures  Labs Reviewed   CBC W/ AUTO DIFFERENTIAL - Abnormal; Notable for the following components:       Result Value    Hemoglobin 10.1 (*)     Hematocrit 31.4 (*)     MCV 77 (*)     MCH 24.8 (*)     RDW 15.2 (*)     Platelets 381 (*)     MPV 8.2 (*)     Gran # (ANC) 9.1 (*)     Gran% 79.7 (*)     Lymph% 13.9 (*)     All other components within normal limits   COMPREHENSIVE METABOLIC PANEL - Abnormal; Notable for the following components:    Sodium 135 (*)     Glucose 208 (*)     Albumin 3.2 (*)     eGFR if  49 (*)     eGFR if non  43 (*)     All other components within normal limits   URINALYSIS, REFLEX TO URINE CULTURE - Abnormal; Notable for the following components:    Protein,  UA 1+ (*)     Occult Blood UA Trace (*)     All other components within normal limits    Narrative:     Preferred Collection Type->Urine, Clean Catch   URINALYSIS MICROSCOPIC - Abnormal; Notable for the following components:    Bacteria, UA Few (*)     All other components within normal limits    Narrative:     Preferred Collection Type->Urine, Clean Catch   POCT GLUCOSE - Abnormal; Notable for the following components:    POCT Glucose 189 (*)     All other components within normal limits   LIPASE   POCT GLUCOSE MONITORING CONTINUOUS     EKG Readings: (Independently Interpreted)   Initial Reading: No STEMI. Previous EKG: Compared with most recent EKG Previous EKG Date: 8/9/18 (Minimal change). Rhythm: Normal Sinus Rhythm. Heart Rate: 92. Ectopy: No Ectopy. Conduction: Normal. ST Segments: Normal ST Segments.       X-Rays:   Independently Interpreted Readings:   Other Readings:  I have visualized all imaging for this patient, radiology has done the interpretation.    Imaging Results          CT Abdomen Pelvis  Without Contrast (Final result)     Abnormal  Result time 01/24/19 19:55:31    Final result by Chiquis Page MD (01/24/19 19:55:31)                 Impression:      Cluster of decompressed matted appearing small bowel in the upper pelvis.  The possibility of adhesions cannot be entirely excluded or fistulous communications.    Hepatic steatosis.    Some flattening of the IVC.  Assess fluid hydration status.    Colonic diverticulosis.  No definite evidence for acute diverticulitis.    Small hiatal hernia.    Stable micronodules seen at the lung bases bilaterally.  Recommend follow-up using the Fleischner criteria.    This report was flagged in Epic as abnormal.      Electronically signed by: Chiquis Page  Date:    01/24/2019  Time:    19:55             Narrative:    EXAMINATION:  CT ABDOMEN PELVIS WITHOUT CONTRAST    CLINICAL HISTORY:  Abd pain, gastroenteritis or colitis suspected;    TECHNIQUE:  Low  dose axial images, sagittal and coronal reformations were obtained from the lung bases to the pubic symphysis.    COMPARISON:  12/04/2018    FINDINGS:  Within the limits of a noncontrast examination, there is mild fatty infiltration of the liver.  The spleen, pancreas, adrenal glands, and right kidney are unremarkable.  The gallbladder is surgically absent.    There is a left renal cyst.    There is some flattening of the IVC.  There are dense vascular calcifications.  There is no abdominal adenopathy or ascites.    There is evidence of multiple small bowel resections.  There is some distension at the anastomotic sites  of small bowel.  An anastomosis deep to the umbilicus contains stool and is dilated up to 5.7 cm.  There is a cluster of decompressed small bowel in the pelvis, which again appears matted appearance.    At the lung bases, there is a small hiatal hernia.  Micronodules measuring less than 6 mm are seen at the lung bases (series 2 axial image 2, 7, 10, 24).                               X-Ray Abdomen Flat And Erect (Final result)  Result time 01/24/19 18:54:03    Final result by Cheko Sapp MD (01/24/19 18:54:03)                 Impression:      1. Moderate amount of stool in the colon, suggesting etiology for slow flow through small bowel loops is on the basis of developing constipation although clinical correlation is advised in this postsurgical patient.  Differential would include the possibility of developing partial small bowel obstruction on the basis of adhesion.      Electronically signed by: Cheko Sapp MD  Date:    01/24/2019  Time:    18:54             Narrative:    EXAMINATION:  XR ABDOMEN FLAT AND ERECT    CLINICAL HISTORY:  Unspecified abdominal pain    TECHNIQUE:  Flat and erect AP views of the abdomen were performed.    COMPARISON:  12/12/2018    FINDINGS:  One upright view, 2 supine views.    There are a few small bowel air-fluid levels on upright view, in nondilated bowel  loops.  Air and stool is seen within the large bowel and projected over the rectum.  There is moderate to large amount of stool in the colon.  Postsurgical changes of the bowel are noted on the right.  No large volume free air or pneumatosis.  There are no calcifications to suggest nephrolithiasis or cholelithiasis.  Postsurgical changes are noted of the right upper quadrant.  The lower lung zones are grossly clear.  There is vascular calcification.  The osseous structures are grossly intact.                              Medical Decision Making:   Initial Assessment:   This is a 68 y.o. female who presents with complaint of abdominal pain beginning yesterday afternoon.  Differential Diagnosis:   Diverticulitis, cholecystitis, pancreatitis, appendicitis, obstruction, constipation, UTI, pyelonephritis, kidney stone, gastroenteritis  Independently Interpreted Test(s):   I have ordered and independently interpreted X-rays - see prior notes.  I have ordered and independently interpreted EKG Reading(s) - see prior notes  Clinical Tests:   Lab Tests: Reviewed       <> Summary of Lab: Borderline leukocytosis, otherwise benign  ED Management:  Vital signs stable, patient feeling somewhat better at this time.  I discussed the case with her surgeon, Dr. Horton, who agrees with admission for observation for re-evaluation in the morning.  I informed the patient and family of plan to admit and they are comfortable with observation at this time.  I have placed car to see orders for admission for Dr. Horton                   ED Course as of Jan 24 2004   Thu Jan 24, 2019   1811 Sort note: Azucena Morrison nontoxic/afebrile 68 y.o.  presented to the ED with c/o abd pain.     Patient seen and medically screened by Nurse Practitioner in Sort process due to ED crowding.  Appropriate tests and/or medications ordered.  Care transferred to an alternate provider when patient was placed in an Exam Room from the [facility] for physical exam,  additional orders, and disposition.  Gaby FNP   [JN]      ED Course User Index  [JN] Trino Aggarwal NP     Clinical Impression:     1. Generalized abdominal pain    2. Abdominal pain    3. Small bowel obstruction due to adhesions    4. Small bowel obstruction      Disposition:   Disposition: Placed in Observation  Condition: Fair       Scribe attestation I, Dr. Felix Cordova, personally performed the services described in this documentation. All medical record entries made by the scribe were at my direction and in my presence.  I have reviewed the chart and agree that the record reflects my personal performance and is accurate and complete. Felix Cordova MD.  8:16 PM 01/24/2019         Felix Cordova MD  01/24/19 2016

## 2019-01-25 NOTE — CONSULTS
"[facility]  General Surgery  Consult Note    Consults  Subjective:     Chief Complaint/Reason for Admission: Abdominal distension    History of Present Illness:andmitted with abdominal distension. surgery cancelled for low potassium    No current facility-administered medications on file prior to encounter.      Current Outpatient Medications on File Prior to Encounter   Medication Sig    acetaminophen (TYLENOL) 325 MG tablet Take 2 tablets (650 mg total) by mouth every 8 (eight) hours as needed.    bisacodyl (DULCOLAX) 5 mg EC tablet Take  1 tablet by mouth twice daily in morning    carvedilol (COREG) 25 MG tablet TAKE ONE TABLET BY MOUTH ONCE A DAY    carvedilol (COREG) 25 MG tablet Take 1 tablet (25 mg total) by mouth once daily.    erythromycin base (E-MYCIN) 500 MG tablet take 1 tablet by mouth  three times daily    gentamicin (GARAMYCIN) 0.1 % cream Apply topically to affected area once daily.    hydroCHLOROthiazide (HYDRODIURIL) 25 MG tablet     hydroCHLOROthiazide (HYDRODIURIL) 25 MG tablet Take 1 tablet (25 mg total) by mouth every 30 days.    HYDROcodone-acetaminophen (NORCO) 5-325 mg per tablet Take 1 tablet by mouth every 4 (four) hours as needed.    insulin glargine 100 units/mL (3mL) SubQ pen Inject subcutaneously 10 units every morning.    metFORMIN (GLUCOPHAGE) 1000 MG tablet Take 1 tablet (1,000 mg total) by mouth 2 (two) times daily.    mupirocin (BACTROBAN) 2 % ointment     neomycin (MYCIFRADIN) 500 mg Tab take  2 tablets ( 1000mg )  by mouth three times daily    ondansetron (ZOFRAN) 4 MG tablet Take 2 tablets (8 mg total) by mouth every 8 (eight) hours as needed for Nausea.    ondansetron (ZOFRAN) 8 MG tablet Take 1 tablet (8 mg total) by mouth every 8 (eight) hours as needed for Nausea.    pen needle, diabetic (BD ULTRA-FINE ONEIL PEN NEEDLE) 32 gauge x 5/32" Ndle use as directed to inject insulin    promethazine-codeine 6.25-10 mg/5 ml (PHENERGAN WITH CODEINE) " 6.25-10 mg/5 mL syrup Take 1 teaspoonful by mouth three times daily.    traMADol (ULTRAM) 50 mg tablet TAKE 1 TABLET BY MOUTH EVERY 6 HOURS    TRUETEST TEST STRIPS Strp        Review of patient's allergies indicates:   Allergen Reactions    Chlorhexidine gluconate Rash     Chlorhexidine/alcohol wipes. Rash and blistering.       Past Medical History:   Diagnosis Date    Diabetes mellitus     Diabetes mellitus, type 2     Hypertension      Past Surgical History:   Procedure Laterality Date     SECTION, CLASSIC      x2    CHOLECYSTECTOMY      CLOSURE-COLOSTOMY N/A 2016    [facility]    COLON SURGERY      [facility]    COLONOSCOPY N/A 2018    [facility] ENDO    COLOSTOMY Left     DEBRIDEMENT-WOUND-ABDOMINAL WALL N/A 2017    [facility]    EXPLORATORY-LAPAROTOMY N/A 2016    [facility]    INCISION, ABDOMINAL WALL N/A 8/10/2018    [facility]    INCISIONAL HERNIA REPAIR Right     LYSIS-ADHESION N/A 2016    [facility]    RESECTION - SMALL BOWEL and fistula resection  N/A 2016    [facility]    WOUND EXPLORATION N/A 2016    [facility]     Family History     None        Tobacco Use    Smoking status: Never Smoker    Smokeless tobacco: Never Used   Substance and Sexual Activity    Alcohol use: No    Drug use: No    Sexual activity: Not Currently     Review of Systems  Objective:     Vital Signs (Most Recent):  Temp: 97.1 °F (36.2 °C) (19 155)  Pulse: 74 (19 155)  Resp: 18 (19 155)  BP: (!) 168/80 (19 1559)  SpO2: 97 % (19 1200) Vital Signs (24h Range):  Temp:  [97.1 °F (36.2 °C)-98.6 °F (37 °C)] 97.1 °F (36.2 °C)  Pulse:  []  74  Resp:  [15-18] 18  SpO2:  [96 %-100 %] 97 %  BP: (119-172)/(64-81) 168/80     Weight: 77 kg (169 lb 12.1 oz)  Body mass index is 31.05 kg/m².      Intake/Output Summary (Last 24 hours) at 1/25/2019 7137  Last data filed at 1/25/2019 1300  Gross per 24 hour   Intake 1920 ml   Output 1450 ml   Net 470 ml       Physical Exam  Abs soft   minimal drainage from fistula site  Significant Labs:      Significant Diagnostics:      Assessment/Plan:     Active Diagnoses:    Diagnosis Date Noted POA    Small bowel obstruction [K56.609] 01/24/2019 Yes    Generalized abdominal pain [R10.84]  Yes      Problems Resolved During this Admission:        CT abd reviewed  abd soft  Seeing Dr. Horton on Wednesday    Can go home with stool softeners    Has enterocutaneous fistula with minimal; drainage now    Plan: reschedule for ex lap  Can be dischraged   Courtney Bell MD  General Surgery  [facility]

## 2019-01-25 NOTE — PLAN OF CARE
Problem: Adult Inpatient Plan of Care  Goal: Plan of Care Review  Outcome: Ongoing (interventions implemented as appropriate)  Pt is AAOX4. No c/o pain or nausea. IV is CDI and infusing. Ab dressing is CDI. VSS. Bed is in lowest position with call light in reach. Will continue to monitor

## 2019-01-25 NOTE — PLAN OF CARE
Problem: Adult Inpatient Plan of Care  Goal: Plan of Care Review  Pt on RA with documented sats. Will continue to monitor.

## 2019-01-26 LAB
BACTERIA SPEC AEROBE CULT: NORMAL
BACTERIA SPEC AEROBE CULT: NORMAL

## 2019-01-26 NOTE — NURSING
Cued into patient's room.  Permission received per patient to turn camera to view patient.  Introduced as VN for night shift that will be instructing her on discharge information.  Educated patient on medications to hold, continue, and start; care of incision; appointment to follow-up with doctor; and when to return to doctor.  Opportunity given for questions and questions answered.  Number given for 24/7 Nurse Line.  Family member at bedside; requested to walk downstairs instead of being wheeled out.  Patient discharged.

## 2019-01-27 NOTE — DISCHARGE SUMMARY
DATE OF ADMISSION:  01/24/2019    DATE OF DISCHARGE:  01/25/2019    HISTORY OF PRESENT ILLNESS AND HOSPITAL COURSE:  This 68-year-old female who was   admitted to the Emergency Room with a possible bowel obstruction.  The patient   was supposed to be admitted the following day for possible exploratory lap colon   resection and small bowel resection, was on bowel prep.  The patient was seen   that day earlier by primary care physician who found the patient had not   medically cleared being low potassium and diabetes out of control.  The patient   then presented to the Emergency Room with abdominal pain, nausea and vomiting.    The patient underwent workup, which showed possible small-bowel obstruction.    The patient was admitted, was kept n.p.o.  The patient was afebrile.  Abdomen   was soft, nontender the following day.  The patient started passing gas, moving   bowels.  The patient was feeling better, tolerated diet.    At this point, it was decided the patient needed medical clearance and has to be   on bowel prep prior to having a repeat attempt at exploratory lap and resection   of the bowel.  The patient discharged on p.o. pain pill.  Advised not to do any   heavy lifting.  The patient was seen in the office on Wednesday and the   patient's culture showed E. coli, which is sensitive to meropenem, which can be   given IV and the patient probably needs IV antibiotics preop and postop after   exploratory lap.    POSTOPERATIVE DIAGNOSIS:  Partial bowel obstruction, diabetes, hypertension,   chronic colocutaneous fistula, nonhealing abdominal wound.      MS/HN  dd: 01/26/2019 12:50:09 (CST)  td: 01/27/2019 00:57:22 (CST)  Doc ID   #[id number]  Job ID #[id number]    CC:

## 2019-01-30 ENCOUNTER — HOSPITAL ENCOUNTER (OUTPATIENT)
Dept: WOUND CARE | Facility: HOSPITAL | Age: 69
Discharge: HOME | End: 2019-01-30
Attending: SURGERY
Payer: MEDICARE

## 2019-01-30 VITALS
SYSTOLIC BLOOD PRESSURE: 148 MMHG | HEART RATE: 84 BPM | BODY MASS INDEX: 31.1 KG/M2 | HEIGHT: 62 IN | DIASTOLIC BLOOD PRESSURE: 77 MMHG | RESPIRATION RATE: 20 BRPM | TEMPERATURE: 98 F | WEIGHT: 169 LBS

## 2019-01-30 DIAGNOSIS — I10 ESSENTIAL HYPERTENSION: ICD-10-CM

## 2019-01-30 DIAGNOSIS — E11.628 DIABETES WITH SKIN COMPLICATION: ICD-10-CM

## 2019-01-30 DIAGNOSIS — K63.2 COLONIC FISTULA: ICD-10-CM

## 2019-01-30 DIAGNOSIS — R10.84 GENERALIZED ABDOMINAL PAIN: ICD-10-CM

## 2019-01-30 DIAGNOSIS — L02.211 ABSCESS OF ABDOMINAL WALL: ICD-10-CM

## 2019-01-30 DIAGNOSIS — T14.8XXA DRAINAGE FROM WOUND: Primary | ICD-10-CM

## 2019-01-30 PROCEDURE — 99213 OFFICE O/P EST LOW 20 MIN: CPT

## 2019-01-30 RX ORDER — GENTAMICIN SULFATE 1 MG/G
CREAM TOPICAL DAILY
Qty: 30 G | Refills: 1 | Status: ON HOLD | OUTPATIENT
Start: 2019-01-30 | End: 2019-02-28 | Stop reason: HOSPADM

## 2019-01-30 NOTE — PROGRESS NOTES
"Subjective:       Patient ID: Azucena Morrison is a 68 y.o. female.    Chief Complaint: Non-healing Wound    6/29/17: Pt re-admit for distal abdominal wound. Pt denies any fevers or chills but states she feels nauseous at times. Returned to [place] June [place], from [place], reports much trouble with abdominal wound while in [place].  7/6/17-- Patient scheduled for surgical drainage of wound Tuesday 7/11/17DB  7/19/17-- Patient post op clinic visit.  8/16/17: CT of abdomen and pelvis done 8/9/17 due to suspected fistula  8/23/17-- U/S of abdomen done today.  12/13/17 Wound vac with 20cc drainage in clinic today.  1/10/18: on PO abx  1/24/18: Fistulagram ordered per Dr. Horton. Patient still on PO abx  02/21/18 Patient is not on antibiotics at this time. BS fasting 135 per patient report this am.  03/07/18 Culture of Anterior Abdominal Wound is positive. No antibiotics at this time.  fasting per patient report.    03/21/18 Patient c/o nausea along with abdominal tenderness near abd midline wound. Patient states that pain level is 6 and that she passed two sero-sanguineous clots from wound. Patient is afebrile this am.  3/28/18: patient continue antibiotics and reports that pain is less than last weeks clinic visit. Drainage has decreased as well  04/04/18 Patient states that abdominal pain is "pressure" sensation and that when she pushes down on her abdomen on either side of abdominal wound she feels like she has to urinate. Patient reports pain level of 3 this am.  fasting this am per patient report.   5/2/18: Patient had Abdomina Toribio today before wound care. She had discontinued Bactrim PO per Dr. Horton's recommendation and culture results and is now taking Amoxicillin PO  6/20/18: Pt reports itching to wound and periwound   6/27/18: patient reports no new complaints with regards to her wound or abdomen, wound continues to decrease in size and drainage  8/1/18: Pt reports increased pain to abdomen with " nausea and emesis since thursday 7/26/18, denies any fevers, patient did no go to ER as instructed by home health nurse on Sat. 7/28/18.  8/29/18: Patient admitted to hospital 8/2 for abdominal wound complications. Surgery for abdominal abscess per Dr. Horton on 8/3. Patient placed on IV antibiotics and discharged home on 8/18 with [facility]  and PICC line to LUE. Patient currently on IV ertapenem. IV medication sent to patients home per [facility].   9/5/18: IV Ertapenem to continue 1 week. Culture sent to lab. [facility]  sent orders. PICC line intact to E.  9/12/18 Antibiotics completed. Culture results pending. 1 deep stitch remaining.  9/12/18: Culture positive for E. Coli, patient to continue Ertapenem IV antibiotics x 2weeks. [facility] notified  9/26/18: F/U abdominal wound, wound continues to improve. Patient will complete IV antibiotics today  10/3/18: patient c/o diarrhea for 2 days and nausea with some vomiting. Labs and stool culture ordered. IV antibiotics discontinued today  10/10/2018 patient will start on IV antibiotic Invaz IV once a day, pending PICC line placement, order placed today  for PICC per Dr. Horton.  10/24/18: patient on IV antibiotics, tolerating well. Continue for 1 week  11/7/2018: IV Antibiotic discontinued.  11/8/2018: PICC line to left upper arm discontinued by Home Health.  11/21/2018 F/u for abdominal wall fistula , c/o nausea  No vomiting , no fever  12/5/2018: F/u for abdominal wall fisula, c/o gas, Dr. Horton will order Bentyl. Surgery schedule for January 2019.  12/12/2018: F/u for abdominal wall fistula, c/o abdominal pain. Dr. Horton ordered Norco 5/325mg tab 1 PO every 4 hours as needed for pain and referred patient for x-ray of abdomen after clinic today.  12/19/2018: F/u for abdominal wall fisula, c/o nausea, Dr. Horton re ordered Ondansetron (Zofran) 8mg one tab by mouth every eight hours as needed for nausea. No changes in wound condition from  last visit noted in clinic today.  12/26/2018: F/u abdominal wall fistula drainage, no c/o at this time. Denies any abdominal pain or nausea. Dr. Horton recommends surgery first week of January 2019 for abdominal fistula treatment and possible colostomy placement, patient agree.  01/02/2019: F/u abdominal wall fistula. Dressing with large amount of drainage, malodorous, Dr. Horton is scheduling surgery for third week of January, 2019.  1/16/19: F/U Dr. Horton today in clinic, surgery scheduled for next Friday 1/25/19 with Dr. Horton.     01/23/2019: Presents to wound care clinic for f/u abdominal wound care tx. Surgery scheduled with Dr. Horton for Friday 01/25/2019. Dr. Horton explained Mrs. Morrison surgery procedure including possible complications, Nurse  (French) present, patient verbalizes understanding of procedure including possible complications, all questions from patient were answered by Dr. Horton including blood sugar and blood pressure medications per patient's concerns. Consent for surgery and blood transfusion signed by patient, Dr. Horton and nurse witnessed.  Abdominal wound cultures collected per Dr. Horton, cultures sent to lab/micro pending results. Dr. Horton prescribed the following orders: fleet enema for Thursday 1/24/2019, clear liquid diet and not to eat after midnight all for 1/24/2019. Start taking Neomycin and erythromycin by mouth three times a day (1/24/2019) and dulcolax one tab by mouth twice a day, Mrs. Morrison voices understanding.    01/30/2019: F/U wound care treatment with Dr. Horton. Per pt, primary physician Dr. Pj Monae ordered for her to take potassium pills for three days, last day today and scheduled for lab work at primary physician clinic on 1/31/2019. Per pt. Feeling better, no more abdominal pain (went to ER 1/24/2019 and discharged 1/25/2019 c/o abd pain.). Dr. Horton awaiting on primary clearance to re-schedule surgery, per pt. She will  call wound care clinic and Dr. Horton to make aware of clearance day. Continue with same orders for dressing change for Dr. Horton. Mrs. Morrison requesting gentamicin refill.      Review of Systems   Constitutional: Negative.    HENT: Negative.    Eyes: Negative.    Respiratory: Negative.    Cardiovascular: Negative.    Gastrointestinal: Negative.    Genitourinary: Negative.    Musculoskeletal: Negative.    Skin: Negative.    Neurological: Negative.    Psychiatric/Behavioral: Negative.        Objective:      Physical Exam   Constitutional: She is oriented to person, place, and time. She appears well-developed and well-nourished.   HENT:   Head: Normocephalic.   Eyes: Conjunctivae and EOM are normal. Pupils are equal, round, and reactive to light.   Neck: Normal range of motion. Neck supple.   Cardiovascular: Normal rate, regular rhythm, normal heart sounds and intact distal pulses.   Pulmonary/Chest: Effort normal and breath sounds normal.   Abdominal: Soft. Bowel sounds are normal.   Musculoskeletal: Normal range of motion.   Neurological: She is alert and oriented to person, place, and time. She has normal reflexes.   Skin: Skin is warm and dry.       Assessment:       1. Drainage from wound    2. Abscess of abdominal wall           Wound 10/31/18 0836 Abscess medial Abdomen #1 (Active)   10/31/18 0836    Pre-existing: Yes   Primary Wound Type: Abscess   Side:    Orientation: medial   Location: Abdomen   Wound/PI Number (optional): #1   Ankle-Brachial Index:    Pulses:    Removal Indication and Assessment:    Wound Outcome:    (Retired) Wound Type:    (Retired) Wound Length (cm):    (Retired) Wound Width (cm):    (Retired) Depth (cm):    Wound Description (Comments):    Removal Indications:    Dressing Appearance Dry;Intact 1/30/2019  8:30 AM   Drainage Amount Moderate 1/30/2019  8:30 AM   Drainage Characteristics/Odor Serosanguineous 1/30/2019  8:30 AM   Appearance Pink;Red;Smooth 1/30/2019  8:30 AM   Tissue  loss description Partial thickness 1/30/2019  8:30 AM   Red (%), Wound Tissue Color 100 % 1/30/2019  8:30 AM   Yellow (%), Wound Tissue Color 20 % 1/30/2019  8:30 AM   Periwound Area Intact;Redness;Moist 1/30/2019  8:30 AM   Wound Edges Approximated 1/30/2019  8:30 AM   Wound Length (cm) 0.2 cm 1/30/2019  8:30 AM   Wound Width (cm) 0.2 cm 1/30/2019  8:30 AM   Wound Depth (cm) 3.8 cm 1/30/2019  8:30 AM   Wound Volume (cm^3) 0.15 cm^3 1/30/2019  8:30 AM   Wound Surface Area (cm^2) 0.04 cm^2 1/30/2019  8:30 AM   Care Cleansed with:;Sterile normal saline 1/30/2019  8:30 AM   Dressing Applied;Island/border;Gauze, wet to moist 1/30/2019  8:30 AM   Periwound Care Topical treatment applied;Skin barrier film applied 1/30/2019  8:30 AM   Dressing Change Due 02/01/19 1/30/2019  8:30 AM         Wound #1 Anterior Abdomen - midline  Other Orders  Dressings & Wound Care  Cleanse wound with Normal Saline. - Cavion or benzoin to periwound   betamethasone periwoind prn itching.  Antimicrobial Ointment/Cream. - Gentamycin to wound bed.  Other: - Apply iodoform gauze over wound, cover with aquacel extra or 4x4 gauze, secure with 5x5 aqacel border and mefix tape.   Do not probe depth of wound  Change dressing HH on Mondays and Fridays. Wound care clinic on Wednesdays.       Plan:                Follow-up in about 7 days (around 2/6/2019).  Pending primary physician Dr. Pj Nguyễn for medical clearance. (surgery)

## 2019-02-01 LAB
BACTERIA SPEC ANAEROBE CULT: NORMAL
BACTERIA SPEC ANAEROBE CULT: NORMAL

## 2019-02-06 ENCOUNTER — HOSPITAL ENCOUNTER (OUTPATIENT)
Dept: WOUND CARE | Facility: HOSPITAL | Age: 69
Discharge: HOME | End: 2019-02-06
Attending: SURGERY
Payer: MEDICARE

## 2019-02-06 VITALS
HEIGHT: 62 IN | WEIGHT: 169 LBS | HEART RATE: 80 BPM | SYSTOLIC BLOOD PRESSURE: 142 MMHG | RESPIRATION RATE: 20 BRPM | DIASTOLIC BLOOD PRESSURE: 82 MMHG | TEMPERATURE: 98 F | BODY MASS INDEX: 31.1 KG/M2

## 2019-02-06 DIAGNOSIS — L02.211 ABDOMINAL WALL ABSCESS: ICD-10-CM

## 2019-02-06 DIAGNOSIS — R10.84 GENERALIZED ABDOMINAL PAIN: ICD-10-CM

## 2019-02-06 DIAGNOSIS — A49.8 INFECTION DUE TO ESBL-PRODUCING ESCHERICHIA COLI: ICD-10-CM

## 2019-02-06 DIAGNOSIS — K63.2 COLOCUTANEOUS FISTULA: ICD-10-CM

## 2019-02-06 DIAGNOSIS — R11.0 NAUSEA: ICD-10-CM

## 2019-02-06 DIAGNOSIS — T81.89XD SUTURE GRANULOMA, SUBSEQUENT ENCOUNTER: ICD-10-CM

## 2019-02-06 DIAGNOSIS — L02.211 ABSCESS OF ABDOMINAL WALL: ICD-10-CM

## 2019-02-06 DIAGNOSIS — T14.8XXA DRAINAGE FROM WOUND: Primary | ICD-10-CM

## 2019-02-06 DIAGNOSIS — I10 ESSENTIAL HYPERTENSION: ICD-10-CM

## 2019-02-06 DIAGNOSIS — K63.2 COLONIC FISTULA: ICD-10-CM

## 2019-02-06 DIAGNOSIS — Z93.3 S/P COLOSTOMY: ICD-10-CM

## 2019-02-06 DIAGNOSIS — K56.609 SMALL BOWEL OBSTRUCTION: ICD-10-CM

## 2019-02-06 DIAGNOSIS — E11.9 TYPE 2 DIABETES MELLITUS WITHOUT COMPLICATION, WITH LONG-TERM CURRENT USE OF INSULIN: ICD-10-CM

## 2019-02-06 DIAGNOSIS — Z16.12 INFECTION DUE TO ESBL-PRODUCING ESCHERICHIA COLI: ICD-10-CM

## 2019-02-06 DIAGNOSIS — E11.628 DIABETES WITH SKIN COMPLICATION: ICD-10-CM

## 2019-02-06 DIAGNOSIS — Z79.4 TYPE 2 DIABETES MELLITUS WITHOUT COMPLICATION, WITH LONG-TERM CURRENT USE OF INSULIN: ICD-10-CM

## 2019-02-06 DIAGNOSIS — Z90.49 HISTORY OF HEMICOLECTOMY: ICD-10-CM

## 2019-02-06 DIAGNOSIS — R10.32 LLQ PAIN: ICD-10-CM

## 2019-02-06 DIAGNOSIS — Z98.890 S/P EXPLORATORY LAPAROTOMY: ICD-10-CM

## 2019-02-06 PROCEDURE — 99214 OFFICE O/P EST MOD 30 MIN: CPT

## 2019-02-06 RX ORDER — LIDOCAINE HYDROCHLORIDE 10 MG/ML
1 INJECTION, SOLUTION EPIDURAL; INFILTRATION; INTRACAUDAL; PERINEURAL ONCE
Status: DISCONTINUED | OUTPATIENT
Start: 2019-02-06 | End: 2019-02-28 | Stop reason: HOSPADM

## 2019-02-06 NOTE — PROGRESS NOTES
"Subjective:       Patient ID: Azucena Morrison is a 68 y.o. female.    Chief Complaint: Non-healing Wound    6/29/17: Pt re-admit for distal abdominal wound. Pt denies any fevers or chills but states she feels nauseous at times. [place]ed to [place], from [place], reports much trouble with abdominal wound while in [place].  7/6/17-- Patient scheduled for surgical drainage of wound Tuesday 7/11/17DB  7/19/17-- Patient post op clinic visit.  8/16/17: CT of abdomen and pelvis done 8/9/17 due to suspected fistula  8/23/17-- U/S of abdomen done today.  12/13/17 Wound vac with 20cc drainage in clinic today.  1/10/18: on PO abx  1/24/18: Fistulagram ordered per Dr. Horton. Patient still on PO abx  02/21/18 Patient is not on antibiotics at this time. BS fasting 135 per patient report this am.  03/07/18 Culture of Anterior Abdominal Wound is positive. No antibiotics at this time.  fasting per patient report.    03/21/18 Patient c/o nausea along with abdominal tenderness near abd midline wound. Patient states that pain level is 6 and that she passed two sero-sanguineous clots from wound. Patient is afebrile this am.  3/28/18: patient continue antibiotics and reports that pain is less than last weeks clinic visit. Drainage has decreased as well  04/04/18 Patient states that abdominal pain is "pressure" sensation and that when she pushes down on her abdomen on either side of abdominal wound she feels like she has to urinate. Patient reports pain level of 3 this am.  fasting this am per patient report.   5/2/18: Patient had Abdomina Toribio today before wound care. She had discontinued Bactrim PO per Dr. Horton's recommendation and culture results and is now taking Amoxicillin PO  6/20/18: Pt reports itching to wound and periwound   6/27/18: patient reports no new complaints with regards to her wound or abdomen, wound continues to decrease in size and drainage  8/1/18: Pt reports increased pain to abdomen with " nausea and emesis since thursday 7/26/18, denies any fevers, patient did no go to ER as instructed by home health nurse on Sat. 7/28/18.  8/29/18: Patient admitted to hospital 8/2 for abdominal wound complications. Surgery for abdominal abscess per Dr. Horton on 8/3. Patient placed on IV antibiotics and discharged home on 8/18 with [facility]  and PICC line to LUE. Patient currently on IV ertapenem. IV medication sent to patients home per [facility].   9/5/18: IV Ertapenem to continue 1 week. Culture sent to lab. [facility]  sent orders. PICC line intact to E.  9/12/18 Antibiotics completed. Culture results pending. 1 deep stitch remaining.  9/12/18: Culture positive for E. Coli, patient to continue Ertapenem IV antibiotics x 2weeks. [facility] notified  9/26/18: F/U abdominal wound, wound continues to improve. Patient will complete IV antibiotics today  10/3/18: patient c/o diarrhea for 2 days and nausea with some vomiting. Labs and stool culture ordered. IV antibiotics discontinued today  10/10/2018 patient will start on IV antibiotic Invaz IV once a day, pending PICC line placement, order placed today  for PICC per Dr. Horton.  10/24/18: patient on IV antibiotics, tolerating well. Continue for 1 week  11/7/2018: IV Antibiotic discontinued.  11/8/2018: PICC line to left upper arm discontinued by Home Health.  11/21/2018 F/u for abdominal wall fistula , c/o nausea  No vomiting , no fever  12/5/2018: F/u for abdominal wall fisula, c/o gas, Dr. Horton will order Bentyl. Surgery schedule for January 2019.  12/12/2018: F/u for abdominal wall fistula, c/o abdominal pain. Dr. Horton ordered Norco 5/325mg tab 1 PO every 4 hours as needed for pain and referred patient for x-ray of abdomen after clinic today.  12/19/2018: F/u for abdominal wall fisula, c/o nausea, Dr. Horton re ordered Ondansetron (Zofran) 8mg one tab by mouth every eight hours as needed for nausea. No changes in wound condition from  last visit noted in clinic today.  12/26/2018: F/u abdominal wall fistula drainage, no c/o at this time. Denies any abdominal pain or nausea. Dr. Horton recommends surgery first week of January 2019 for abdominal fistula treatment and possible colostomy placement, patient agree.  01/02/2019: F/u abdominal wall fistula. Dressing with large amount of drainage, malodorous, Dr. Horton is scheduling surgery for third week of January, 2019.  1/16/19: F/U Dr. Horton today in clinic, surgery scheduled for next Friday 1/25/19 with Dr. Horton.     01/23/2019: Presents to wound care clinic for f/u abdominal wound care tx. Surgery scheduled with Dr. Horton for Friday 01/25/2019. Dr. Horton explained Mrs. Morrison surgery procedure including possible complications, Nurse  (Georgian) present, patient verbalizes understanding of procedure including possible complications, all questions from patient were answered by Dr. Horton including blood sugar and blood pressure medications per patient's concerns. Consent for surgery and blood transfusion signed by patient, Dr. Horton and nurse witnessed.  Abdominal wound cultures collected per Dr. Horton, cultures sent to lab/micro pending results. Dr. Horton prescribed the following orders: fleet enema for Thursday 1/24/2019, clear liquid diet and not to eat after midnight all for 1/24/2019. Start taking Neomycin and erythromycin by mouth three times a day (1/24/2019) and dulcolax one tab by mouth twice a day, Mrs. Morrison voices understanding.     01/30/2019: F/U wound care treatment with Dr. Horton. Per pt, primary physician Dr. Pj Monae ordered for her to take potassium pills for three days, last day today and scheduled for lab work at primary physician clinic on 1/31/2019. Per pt. Feeling better, no more abdominal pain (went to ER 1/24/2019 and discharged 1/25/2019 c/o abd pain.). Dr. Horton awaiting on primary clearance to re-schedule surgery, per pt. She will  "call wound care clinic and Dr. Horton to make aware of clearance day. Continue with same orders for dressing change for Dr. Horton. Mrs. Morrison requesting gentamicin refill.    02/06/2019: F/u with Dr. Horton for wound care treatment. Mrs. Morrison brought to clinic a clearance for surgery by Dr. Pj Sanches dated 02/06/2019 "Hyperkalemia-Resolved K 4.8 2/1/2019, labs , Cr. 1.05, H/H 10/30. No contraindication to surgery, tight control of BS, Hydration." A copy of EKG (1/24/2019) and lab work from [facility] collected 1/31/2019, reported results 2/1/2019. Dr. Horton scheduled surgery for 02/22/2019, consent were signed on 01/23/2019, all questions were answered by Dr. Horton, this nurse  (Luxembourgish) present. Education provided to patient on the importance of having a clear liquid diet the day before surgery 02/21/2019 as per Dr. Horton, new medications and preop preparation before surgery, verbalizes understanding. Dr. Horton ordered Norco 5/325 mg PO for pain, Dulcolax two tabs by mouth to take on 02/21/2019, Soap suds enema (SSE) on Thursday 02/21/2019, Golytely by mouth 2 liters on 2/21/2019, Erythromycin 500 mg one tab by mouth three times a day and Neomycin 1 gm by mouth three times a day.      Review of Systems   Constitutional: Negative.    HENT: Negative.    Eyes: Negative.    Respiratory: Negative.    Cardiovascular: Negative.    Gastrointestinal: Negative.    Genitourinary: Negative.    Musculoskeletal: Negative.    Skin: Negative.    Neurological: Negative.    Psychiatric/Behavioral: Negative.        Objective:      Physical Exam   Constitutional: She is oriented to person, place, and time. She appears well-developed and well-nourished.   HENT:   Head: Normocephalic.   Eyes: Conjunctivae and EOM are normal. Pupils are equal, round, and reactive to light.   Neck: Normal range of motion. Neck supple.   Cardiovascular: Normal rate, regular rhythm, normal heart sounds and intact " distal pulses.   Pulmonary/Chest: Effort normal and breath sounds normal.   Abdominal: Soft. Bowel sounds are normal.   Musculoskeletal: Normal range of motion.   Neurological: She is alert and oriented to person, place, and time. She has normal reflexes.   Skin: Skin is warm and dry.       Assessment:       No diagnosis found.       Wound 10/31/18 0836 Abscess medial Abdomen #1 (Active)   10/31/18 0836    Pre-existing: Yes   Primary Wound Type: Abscess   Side:    Orientation: medial   Location: Abdomen   Wound/PI Number (optional): #1   Ankle-Brachial Index:    Pulses:    Removal Indication and Assessment:    Wound Outcome:    (Retired) Wound Type:    (Retired) Wound Length (cm):    (Retired) Wound Width (cm):    (Retired) Depth (cm):    Wound Description (Comments):    Removal Indications:    Wound Image   2/6/2019 10:30 AM   Dressing Appearance Moist drainage 2/6/2019 10:30 AM   Drainage Amount Moderate 2/6/2019 10:30 AM   Drainage Characteristics/Odor Serosanguineous;Creamy 2/6/2019 10:30 AM   Appearance Pink;Red;Yellow;Smooth 2/6/2019 10:30 AM   Tissue loss description Partial thickness 2/6/2019 10:30 AM   Red (%), Wound Tissue Color 80 % 2/6/2019 10:30 AM   Yellow (%), Wound Tissue Color 20 % 2/6/2019 10:30 AM   Periwound Area Intact;Moist;Redness 2/6/2019 10:30 AM   Wound Edges Approximated 2/6/2019 10:30 AM   Wound Length (cm) 0.2 cm 2/6/2019 10:30 AM   Wound Width (cm) 0.2 cm 2/6/2019 10:30 AM   Wound Depth (cm) 3.8 cm 2/6/2019 10:30 AM   Wound Volume (cm^3) 0.15 cm^3 2/6/2019 10:30 AM   Wound Surface Area (cm^2) 0.04 cm^2 2/6/2019 10:30 AM   Care Cleansed with:;Sterile normal saline 2/6/2019 10:30 AM   Dressing Applied;Island/border;Gauze 2/6/2019 10:30 AM   Dressing Change Due 02/08/19 2/6/2019 10:30 AM         Wound #1 Anterior Abdomen - midline  Dressings & Wound Care  Cleanse wound with Normal Saline. - Cavion or benzoin to periwound   betamethasone periwoind prn itching.  Antimicrobial Ointment/Cream.  - applied Gentamycin to wound bed, iodoform gauze over wound, cover with aquacel extra or 4x4 gauze, secure with 5x5 aqacel border and mefix tape.   Do not probe depth of wound  Change dressing HH on Mondays and Fridays. Wound care clinic on Wednesdays.      Plan:              Follow-up in about 1 week (around 2/13/2019).  Take medications as ordered, follow preop orders.  Surgery on 02/22/2019 by Dr. Horton         will start on bowel prep on 21 st February.

## 2019-02-12 RX ORDER — DICYCLOMINE HYDROCHLORIDE 10 MG/1
CAPSULE ORAL
Qty: 120 CAPSULE | Refills: 0 | Status: SHIPPED | OUTPATIENT
Start: 2019-02-12 | End: 2019-04-24 | Stop reason: SDUPTHER

## 2019-02-13 ENCOUNTER — HOSPITAL ENCOUNTER (OUTPATIENT)
Dept: WOUND CARE | Facility: HOSPITAL | Age: 69
Discharge: HOME | End: 2019-02-13
Attending: SURGERY
Payer: MEDICARE

## 2019-02-13 VITALS
RESPIRATION RATE: 20 BRPM | BODY MASS INDEX: 31.1 KG/M2 | SYSTOLIC BLOOD PRESSURE: 171 MMHG | TEMPERATURE: 98 F | HEIGHT: 62 IN | HEART RATE: 77 BPM | WEIGHT: 169 LBS | DIASTOLIC BLOOD PRESSURE: 86 MMHG

## 2019-02-13 DIAGNOSIS — L98.499 TYPE 2 DIABETES MELLITUS WITH OTHER SKIN ULCER, UNSPECIFIED WHETHER LONG TERM INSULIN USE: ICD-10-CM

## 2019-02-13 DIAGNOSIS — Z98.890 S/P EXPLORATORY LAPAROTOMY: ICD-10-CM

## 2019-02-13 DIAGNOSIS — T14.8XXA DRAINAGE FROM WOUND: Primary | ICD-10-CM

## 2019-02-13 DIAGNOSIS — I10 ESSENTIAL HYPERTENSION: ICD-10-CM

## 2019-02-13 DIAGNOSIS — K63.2 COLOCUTANEOUS FISTULA: ICD-10-CM

## 2019-02-13 DIAGNOSIS — L02.211 ABDOMINAL WALL ABSCESS: ICD-10-CM

## 2019-02-13 DIAGNOSIS — R10.32 LLQ PAIN: ICD-10-CM

## 2019-02-13 DIAGNOSIS — E11.622 TYPE 2 DIABETES MELLITUS WITH OTHER SKIN ULCER, UNSPECIFIED WHETHER LONG TERM INSULIN USE: ICD-10-CM

## 2019-02-13 PROCEDURE — 99213 OFFICE O/P EST LOW 20 MIN: CPT

## 2019-02-13 NOTE — PROGRESS NOTES
"Subjective:       Patient ID: Azucena Morrison is a 68 y.o. female.    Chief Complaint: Non-healing Wound    6/29/17: Pt re-admit for distal abdominal wound. Pt denies any fevers or chills but states she feels nauseous at times. Returned to [place] June [place], from [place], reports much trouble with abdominal wound while in [place].  7/6/17-- Patient scheduled for surgical drainage of wound Tuesday 7/11/17DB  7/19/17-- Patient post op clinic visit.  8/16/17: CT of abdomen and pelvis done 8/9/17 due to suspected fistula  8/23/17-- U/S of abdomen done today.  12/13/17 Wound vac with 20cc drainage in clinic today.  1/10/18: on PO abx  1/24/18: Fistulagram ordered per Dr. Horton. Patient still on PO abx  02/21/18 Patient is not on antibiotics at this time. BS fasting 135 per patient report this am.  03/07/18 Culture of Anterior Abdominal Wound is positive. No antibiotics at this time.  fasting per patient report.    03/21/18 Patient c/o nausea along with abdominal tenderness near abd midline wound. Patient states that pain level is 6 and that she passed two sero-sanguineous clots from wound. Patient is afebrile this am.  3/28/18: patient continue antibiotics and reports that pain is less than last weeks clinic visit. Drainage has decreased as well  04/04/18 Patient states that abdominal pain is "pressure" sensation and that when she pushes down on her abdomen on either side of abdominal wound she feels like she has to urinate. Patient reports pain level of 3 this am.  fasting this am per patient report.   5/2/18: Patient had Abdomina Toribio today before wound care. She had discontinued Bactrim PO per Dr. Horton's recommendation and culture results and is now taking Amoxicillin PO  6/20/18: Pt reports itching to wound and periwound   6/27/18: patient reports no new complaints with regards to her wound or abdomen, wound continues to decrease in size and drainage  8/1/18: Pt reports increased pain to abdomen with " nausea and emesis since thursday 7/26/18, denies any fevers, patient did no go to ER as instructed by home health nurse on Sat. 7/28/18.  8/29/18: Patient admitted to hospital 8/2 for abdominal wound complications. Surgery for abdominal abscess per Dr. Horton on 8/3. Patient placed on IV antibiotics and discharged home on 8/18 with [facility]  and PICC line to LUE. Patient currently on IV ertapenem. IV medication sent to patients home per [facility].   9/5/18: IV Ertapenem to continue 1 week. Culture sent to lab. [facility]  sent orders. PICC line intact to E.  9/12/18 Antibiotics completed. Culture results pending. 1 deep stitch remaining.  9/12/18: Culture positive for E. Coli, patient to continue Ertapenem IV antibiotics x 2weeks. [facility] notified  9/26/18: F/U abdominal wound, wound continues to improve. Patient will complete IV antibiotics today  10/3/18: patient c/o diarrhea for 2 days and nausea with some vomiting. Labs and stool culture ordered. IV antibiotics discontinued today  10/10/2018 patient will start on IV antibiotic Invaz IV once a day, pending PICC line placement, order placed today  for PICC per Dr. Horton.  10/24/18: patient on IV antibiotics, tolerating well. Continue for 1 week  11/7/2018: IV Antibiotic discontinued.  11/8/2018: PICC line to left upper arm discontinued by Home Health.  11/21/2018 F/u for abdominal wall fistula , c/o nausea  No vomiting , no fever  12/5/2018: F/u for abdominal wall fisula, c/o gas, Dr. Horton will order Bentyl. Surgery schedule for January 2019.  12/12/2018: F/u for abdominal wall fistula, c/o abdominal pain. Dr. Horton ordered Norco 5/325mg tab 1 PO every 4 hours as needed for pain and referred patient for x-ray of abdomen after clinic today.  12/19/2018: F/u for abdominal wall fisula, c/o nausea, Dr. Horton re ordered Ondansetron (Zofran) 8mg one tab by mouth every eight hours as needed for nausea. No changes in wound condition from  last visit noted in clinic today.  12/26/2018: F/u abdominal wall fistula drainage, no c/o at this time. Denies any abdominal pain or nausea. Dr. Horton recommends surgery first week of January 2019 for abdominal fistula treatment and possible colostomy placement, patient agree.  01/02/2019: F/u abdominal wall fistula. Dressing with large amount of drainage, malodorous, Dr. Horton is scheduling surgery for third week of January, 2019.  1/16/19: F/U Dr. Horton today in clinic, surgery scheduled for next Friday 1/25/19 with Dr. Horton.     01/23/2019: Presents to wound care clinic for f/u abdominal wound care tx. Surgery scheduled with Dr. Horton for Friday 01/25/2019. Dr. Horton explained Mrs. Morrison surgery procedure including possible complications, Nurse  (Setswana) present, patient verbalizes understanding of procedure including possible complications, all questions from patient were answered by Dr. Horton including blood sugar and blood pressure medications per patient's concerns. Consent for surgery and blood transfusion signed by patient, Dr. Horton and nurse witnessed.  Abdominal wound cultures collected per Dr. Horton, cultures sent to lab/micro pending results. Dr. Horton prescribed the following orders: fleet enema for Thursday 1/24/2019, clear liquid diet and not to eat after midnight all for 1/24/2019. Start taking Neomycin and erythromycin by mouth three times a day (1/24/2019) and dulcolax one tab by mouth twice a day, Mrs. Morrison voices understanding.     01/30/2019: F/U wound care treatment with Dr. Horton. Per pt, primary physician Dr. Pj Monae ordered for her to take potassium pills for three days, last day today and scheduled for lab work at primary physician clinic on 1/31/2019. Per pt. Feeling better, no more abdominal pain (went to ER 1/24/2019 and discharged 1/25/2019 c/o abd pain.). Dr. Horton awaiting on primary clearance to re-schedule surgery, per pt. She will  "call wound care clinic and Dr. Horton to make aware of clearance day. Continue with same orders for dressing change for Dr. Horton. Mrs. Morrison requesting gentamicin refill.     02/06/2019: F/u with Dr. Horton for wound care treatment. Mrs. Morrison brought to clinic a clearance for surgery by Dr. Pj Sanches dated 02/06/2019 "Hyperkalemia-Resolved K 4.8 2/1/2019, labs , Cr. 1.05, H/H 10/30. No contraindication to surgery, tight control of BS, Hydration." A copy of EKG (1/24/2019) and lab work from [facility] collected 1/31/2019, reported results 2/1/2019. Dr. Horton scheduled surgery for 02/22/2019, consent were signed on 01/23/2019, all questions were answered by Dr. Horton, this nurse  (Latvian) present. Education provided to patient on the importance of having a clear liquid diet the day before surgery 02/21/2019 as per Dr. Horton, new medications and preop preparation before surgery, verbalizes understanding. Dr. Horton ordered Norco 5/325 mg PO for pain, Dulcolax two tabs by mouth to take on 02/21/2019, Soap suds enema (SSE) on Thursday 02/21/2019, Golytely by mouth 2 liters on 2/21/2019, Erythromycin 500 mg one tab by mouth three times a day and Neomycin 1 gm by mouth three times a day.    2/13/2019: Presents to wound care clinic for a f/u with Dr. Horton for wound care treatment. No new orders in wound dressing change, reviewed preop orders with Mrs. Morrison per Dr. Horton, all questions answered. Surgery scheduled for 02/22/2019.      Review of Systems   Constitutional: Negative.    HENT: Negative.    Eyes: Negative.    Respiratory: Negative.    Cardiovascular: Negative.    Gastrointestinal: Negative.    Genitourinary: Negative.    Musculoskeletal: Negative.    Skin: Positive for color change, rash and wound.   Neurological: Negative.    Psychiatric/Behavioral: Negative.        Objective:      Physical Exam   Constitutional: She is oriented to person, place, and time. She " appears well-developed and well-nourished.   HENT:   Head: Normocephalic.   Eyes: Conjunctivae and EOM are normal. Pupils are equal, round, and reactive to light.   Neck: Normal range of motion. Neck supple.   Cardiovascular: Normal rate, regular rhythm, normal heart sounds and intact distal pulses.   Pulmonary/Chest: Effort normal and breath sounds normal.   Abdominal: Soft. Bowel sounds are normal.       Musculoskeletal: Normal range of motion.   Neurological: She is alert and oriented to person, place, and time. She has normal reflexes.   Skin: Skin is warm and dry.       Assessment:       1. Drainage from wound    2. Abdominal wall abscess           Wound 10/31/18 0836 Abscess medial Abdomen #1 (Active)   10/31/18 0836    Pre-existing: Yes   Primary Wound Type: Abscess   Side:    Orientation: medial   Location: Abdomen   Wound/PI Number (optional): #1   Ankle-Brachial Index:    Pulses:    Removal Indication and Assessment:    Wound Outcome:    (Retired) Wound Type:    (Retired) Wound Length (cm):    (Retired) Wound Width (cm):    (Retired) Depth (cm):    Wound Description (Comments):    Removal Indications:    Dressing Appearance Moist drainage 2/13/2019  8:30 AM   Drainage Amount Moderate 2/13/2019  8:30 AM   Drainage Characteristics/Odor Serosanguineous 2/13/2019  8:30 AM   Appearance Pink;Red 2/13/2019  8:30 AM   Tissue loss description Partial thickness 2/13/2019  8:30 AM   Red (%), Wound Tissue Color 80 % 2/13/2019  8:30 AM   Yellow (%), Wound Tissue Color 20 % 2/13/2019  8:30 AM   Periwound Area Intact;Moist 2/13/2019  8:30 AM   Wound Edges Approximated 2/13/2019  8:30 AM   Wound Length (cm) 0.2 cm 2/13/2019  8:30 AM   Wound Width (cm) 0.2 cm 2/13/2019  8:30 AM   Wound Depth (cm) 3.8 cm 2/13/2019  8:30 AM   Wound Volume (cm^3) 0.15 cm^3 2/13/2019  8:30 AM   Wound Surface Area (cm^2) 0.04 cm^2 2/13/2019  8:30 AM   Care Cleansed with:;Sterile normal saline 2/13/2019  8:30 AM   Dressing Applied;Island/border  2/13/2019  8:30 AM   Dressing Change Due 02/15/19 2/13/2019  8:30 AM         Wound #1 Anterior Abdomen - midline  Dressings & Wound Care  Cleanse wound with Normal Saline. - Cavion or benzoin to periwound   betamethasone periwoind prn itching.  Antimicrobial Ointment/Cream. - applied Gentamycin to wound bed, iodoform gauze over wound, cover with aquacel extra or 4x4 gauze, secure with 5x5 aqacel border and mefix tape.   Do not probe depth of wound  Change dressing HH on Mondays and Fridays. Wound care clinic on Wednesdays.       Plan:                Follow-up in about 1 week (around 2/20/2019).  Take medications as ordered, follow preop orders.  Surgery scheduled on 02/22/2019 by Dr. Horton          Will start on bowel prep on 21 st February, 2019 per Dr. Horton's orders

## 2019-02-20 ENCOUNTER — HOSPITAL ENCOUNTER (OUTPATIENT)
Dept: WOUND CARE | Facility: HOSPITAL | Age: 69
Discharge: HOME | End: 2019-02-20
Attending: SURGERY
Payer: MEDICARE

## 2019-02-20 VITALS
SYSTOLIC BLOOD PRESSURE: 149 MMHG | BODY MASS INDEX: 31.1 KG/M2 | DIASTOLIC BLOOD PRESSURE: 81 MMHG | HEIGHT: 62 IN | TEMPERATURE: 98 F | HEART RATE: 82 BPM | WEIGHT: 169 LBS

## 2019-02-20 DIAGNOSIS — L02.211 ABDOMINAL WALL ABSCESS: ICD-10-CM

## 2019-02-20 DIAGNOSIS — L98.499 TYPE 2 DIABETES MELLITUS WITH OTHER SKIN ULCER, UNSPECIFIED WHETHER LONG TERM INSULIN USE: ICD-10-CM

## 2019-02-20 DIAGNOSIS — E11.622 TYPE 2 DIABETES MELLITUS WITH OTHER SKIN ULCER, UNSPECIFIED WHETHER LONG TERM INSULIN USE: ICD-10-CM

## 2019-02-20 DIAGNOSIS — T14.8XXA DRAINAGE FROM WOUND: Primary | ICD-10-CM

## 2019-02-20 DIAGNOSIS — K56.609 SMALL BOWEL OBSTRUCTION: ICD-10-CM

## 2019-02-20 DIAGNOSIS — K63.2 COLOCUTANEOUS FISTULA: ICD-10-CM

## 2019-02-20 DIAGNOSIS — R10.32 LLQ PAIN: ICD-10-CM

## 2019-02-20 DIAGNOSIS — I10 ESSENTIAL HYPERTENSION: ICD-10-CM

## 2019-02-20 PROCEDURE — 99214 OFFICE O/P EST MOD 30 MIN: CPT

## 2019-02-20 NOTE — PROGRESS NOTES
"[facility]                Progress Note    Subjective:       Patient ID: Azucena Morrison is a 68 y.o. female.    Chief Complaint: Non-healing Wound Follow Up    Chief Complaint: Non-healing Wound     6/29/17: Pt re-admit for distal abdominal wound. Pt denies any fevers or chills but states she feels nauseous at times. [place]ed to [place], from [place], reports much trouble with abdominal wound while in [place].  7/6/17-- Patient scheduled for surgical drainage of wound Tuesday 7/11/17DB 7/19/17-- Patient post op clinic visit.  8/16/17: CT of abdomen and pelvis done 8/9/17 due to suspected fistula  8/23/17-- U/S of abdomen done today.  12/13/17 Wound vac with 20cc drainage in clinic today.  1/10/18: on PO abx  1/24/18: Fistulagram ordered per Dr. Horton. Patient still on PO abx  02/21/18 Patient is not on antibiotics at this time. BS fasting 135 per patient report this am.  03/07/18 Culture of Anterior Abdominal Wound is positive. No antibiotics at this time.  fasting per patient report.    03/21/18 Patient c/o nausea along with abdominal tenderness near abd midline wound. Patient states that pain level is 6 and that she passed two sero-sanguineous clots from wound. Patient is afebrile this am.  3/28/18: patient continue antibiotics and reports that pain is less than last weeks clinic visit. Drainage has decreased as well  04/04/18 Patient states that abdominal pain is "pressure" sensation and that when she pushes down on her abdomen on either side of abdominal wound she feels like she has to urinate. Patient reports pain level of 3 this am.  fasting this am per patient report.   5/2/18: Patient had Abdomina Toribio today before wound care. She had discontinued Bactrim PO per Dr. Horton's recommendation and culture results and is now taking Amoxicillin PO  6/20/18: Pt reports itching to wound and periwound   6/27/18: patient reports no new " complaints with regards to her wound or abdomen, wound continues to decrease in size and drainage  8/1/18: Pt reports increased pain to abdomen with nausea and emesis since thursday 7/26/18, denies any fevers, patient did no go to ER as instructed by home health nurse on Sat. 7/28/18.  8/29/18: Patient admitted to hospital 8/2 for abdominal wound complications. Surgery for abdominal abscess per Dr. Horton on 8/3. Patient placed on IV antibiotics and discharged home on 8/18 with [facility] and PICC line to [facility]. Patient currently on IV ertapenem. IV medication sent to patients home per [facility].   9/5/18: IV Ertapenem to continue 1 week. Culture sent to lab. [facility] sent orders. PICC line intact to [facility].  9/12/18 Antibiotics completed. Culture results pending. 1 deep stitch remaining.  9/12/18: Culture positive for E. Coli, patient to continue Ertapenem IV antibiotics x 2weeks. [facility] notified  9/26/18: F/U abdominal wound, wound continues to improve. Patient will complete IV antibiotics today  10/3/18: patient c/o diarrhea for 2 days and nausea with some vomiting. Labs and stool culture ordered. IV antibiotics discontinued today  10/10/2018 patient will start on IV antibiotic Invaz IV once a day, pending PICC line placement, order placed today  for PICC per Dr. Horton.  10/24/18: patient on IV antibiotics, tolerating well. Continue for 1 week  11/7/2018: IV Antibiotic discontinued.  11/8/2018: PICC line to left upper arm discontinued by Home Health.  11/21/2018 F/u for abdominal wall fistula , c/o nausea  No vomiting , no fever  12/5/2018: F/u for abdominal wall fisula, c/o gas, Dr. Horton will order Bentyl. Surgery schedule for January 2019.  12/12/2018: F/u for abdominal wall fistula, c/o abdominal pain. Dr. Horton ordered Norco 5/325mg tab 1 PO every 4 hours as needed for pain and referred patient for x-ray of abdomen after clinic today.  12/19/2018: F/u for abdominal wall fisula, c/o  nausea, Dr. Horton re ordered Ondansetron (Zofran) 8mg one tab by mouth every eight hours as needed for nausea. No changes in wound condition from last visit noted in clinic today.  12/26/2018: F/u abdominal wall fistula drainage, no c/o at this time. Denies any abdominal pain or nausea. Dr. Horton recommends surgery first week of January 2019 for abdominal fistula treatment and possible colostomy placement, patient agree.  01/02/2019: F/u abdominal wall fistula. Dressing with large amount of drainage, malodorous, Dr. Horton is scheduling surgery for third week of January, 2019.  1/16/19: F/U Dr. Horton today in clinic, surgery scheduled for next Friday 1/25/19 with Dr. Horton.     01/23/2019: Presents to wound care clinic for f/u abdominal wound care tx. Surgery scheduled with Dr. Horton for Friday 01/25/2019. Dr. Horton explained Mrs. Morrison surgery procedure including possible complications, Nurse  (Thai) present, patient verbalizes understanding of procedure including possible complications, all questions from patient were answered by Dr. Horton including blood sugar and blood pressure medications per patient's concerns. Consent for surgery and blood transfusion signed by patient, Dr. Horton and nurse witnessed.  Abdominal wound cultures collected per Dr. Horton, cultures sent to lab/micro pending results. Dr. Horton prescribed the following orders: fleet enema for Thursday 1/24/2019, clear liquid diet and not to eat after midnight all for 1/24/2019. Start taking Neomycin and erythromycin by mouth three times a day (1/24/2019) and dulcolax one tab by mouth twice a day, Mrs. Morrison voices understanding.     01/30/2019: F/U wound care treatment with Dr. Horton. Per pt, primary physician Dr. Pj Monae ordered for her to take potassium pills for three days, last day today and scheduled for lab work at primary physician clinic on 1/31/2019. Per pt. Feeling better, no more abdominal pain  "(went to ER 1/24/2019 and discharged 1/25/2019 c/o abd pain.). Dr. Horton awaiting on primary clearance to re-schedule surgery, per pt. She will call wound care clinic and Dr. Horton to make aware of clearance day. Continue with same orders for dressing change for Dr. Horton. Mrs. Morrsion requesting gentamicin refill.     02/06/2019: F/u with Dr. Horton for wound care treatment. Mrs. Morrison brought to clinic a clearance for surgery by Dr. Pj Sanches dated 02/06/2019 "Hyperkalemia-Resolved K 4.8 2/1/2019, labs , Cr. 1.05, H/H 10/30. No contraindication to surgery, tight control of BS, Hydration." A copy of EKG (1/24/2019) and lab work from [facility] collected 1/31/2019, reported results 2/1/2019. Dr. Horton scheduled surgery for 02/22/2019, consent were signed on 01/23/2019, all questions were answered by Dr. Horton, this nurse  (Citizen of [place]) present. Education provided to patient on the importance of having a clear liquid diet the day before surgery 02/21/2019 as per Dr. Horton, new medications and preop preparation before surgery, verbalizes understanding. Dr. Horton ordered Norco 5/325 mg PO for pain, Dulcolax two tabs by mouth to take on 02/21/2019, Soap suds enema (SSE) on Thursday 02/21/2019, Golytely by mouth 2 liters on 2/21/2019, Erythromycin 500 mg one tab by mouth three times a day and Neomycin 1 gm by mouth three times a day.     2/13/2019: Presents to wound care clinic for a f/u with Dr. Horton for wound care treatment. No new orders in wound dressing change, reviewed preop orders with Mrs. Morrison per Dr. Horton, all questions answered. Surgery scheduled for 02/22/2019.  2/20/19: Continues wound care a previously ordered.  Depth measured per Dr. Horton 8cm.  Preop orders reviewed with patient who verbalized understanding.  Surgery scheduled for Fri 2/22/19.  Rx given to patient for Norco and Zofran.            Review of Systems   Constitutional: Negative.    HENT: " Negative.    Eyes: Negative.    Respiratory: Negative.    Cardiovascular: Negative.    Gastrointestinal: Positive for abdominal distention and abdominal pain.   Genitourinary: Negative.    Musculoskeletal: Negative.    Skin: Positive for color change, rash and wound.   Neurological: Negative.    Psychiatric/Behavioral: Negative.          Objective:        Physical Exam   Constitutional: She is oriented to person, place, and time. She appears well-developed and well-nourished.   HENT:   Head: Normocephalic.   Eyes: Conjunctivae and EOM are normal. Pupils are equal, round, and reactive to light.   Neck: Normal range of motion. Neck supple.   Cardiovascular: Normal rate, regular rhythm, normal heart sounds and intact distal pulses.   Pulmonary/Chest: Effort normal and breath sounds normal.   Abdominal: Soft. Bowel sounds are normal.   Musculoskeletal: Normal range of motion.   Neurological: She is alert and oriented to person, place, and time. She has normal reflexes.   Skin: Skin is warm and dry.       Vitals:    02/20/19 0847   BP: (!) 149/81   Pulse: 82   Temp: 98.4 °F (36.9 °C)       Assessment:           ICD-10-CM ICD-9-CM   1. Drainage from wound T14.8XXA 879.8   2. Abdominal wall abscess L02.211 682.2   3. 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, unspecified whether long term insulin use E11.622 250.80    L98.499 707.9   4. Small bowel obstruction K56.609 560.9   5. Colocutaneous fistula K63.2 569.81            Wound 10/31/18 0836 Abscess medial Abdomen #1 (Active)   10/31/18 0836    Pre-existing: Yes   Primary Wound Type: Abscess   Side:    Orientation: medial   Location: Abdomen   Wound/PI Number (optional): #1   Ankle-Brachial Index:    Pulses:    Removal Indication and Assessment:    Wound Outcome:    (Retired) Wound Type:    (Retired) Wound Length (cm):    (Retired) Wound Width (cm):    (Retired) Depth (cm):    Wound Description (Comments):    Removal Indications:    Dressing Appearance Intact;Moist  drainage 2/20/2019  9:01 AM   Drainage Amount Moderate 2/20/2019  9:01 AM   Drainage Characteristics/Odor Serosanguineous;Bleeding controlled 2/20/2019  9:01 AM   Appearance Red;Pink;Moist;Bleeding 2/20/2019  9:01 AM   Tissue loss description Full thickness 2/20/2019  9:01 AM   Red (%), Wound Tissue Color 100 % 2/20/2019  9:01 AM   Periwound Area Intact 2/20/2019  9:01 AM   Wound Edges Open 2/20/2019  9:01 AM   Wound Length (cm) 0.7 cm 2/20/2019  9:01 AM   Wound Width (cm) 0.3 cm 2/20/2019  9:01 AM   Wound Depth (cm) 8 cm 2/20/2019  9:01 AM   Wound Volume (cm^3) 1.68 cm^3 2/20/2019  9:01 AM   Wound Surface Area (cm^2) 0.21 cm^2 2/20/2019  9:01 AM   Care Cleansed with:;Sterile normal saline 2/20/2019  9:01 AM       Wound #1 Anterior Abdomen - midline    Cleanse wound with:  Normal Saline.  Periwound:  - Cavion or benzoin to periwound; betamethasone periwoind prn itching.  Antimicrobial Ointment/Cream:  Gentamycin to wound bed.  Secondary dressing: Apply iodoform gauze over wound, cover with aquacel extra, 4x4 gauze, small abd pad folded in half, secure with mefix tape.   Do not probe depth of wound  Frequency:  Mon, Wed, Fri      Home Health: [facility]: Home health nurse to perform wound assessment and dressing change on Mon, Wed, Fri except when in clinic; Next clinic appt on Wed 2/27/19.      Other orders: Follow instructed/written preop orders given on 2/6/2019 and reinforced today 2/20/2019.    Rx given to patient for Norco 5/325mg and Zofran 8mg.    Plan:            Follow-up in 1 week with Dr. Horton Wed 2/27/19   --Surgery scheduled  Friday 2/22/2019 per Dr. Horton.

## 2019-02-22 ENCOUNTER — ANESTHESIA (OUTPATIENT)
Dept: SURGERY | Facility: HOSPITAL | Age: 69
DRG: 336 | End: 2019-02-22
Payer: MEDICARE

## 2019-02-22 ENCOUNTER — ANESTHESIA EVENT (OUTPATIENT)
Dept: SURGERY | Facility: HOSPITAL | Age: 69
DRG: 336 | End: 2019-02-22
Payer: MEDICARE

## 2019-02-22 ENCOUNTER — HOSPITAL ENCOUNTER (INPATIENT)
Facility: HOSPITAL | Age: 69
LOS: 6 days | Discharge: HOME HEALTH | DRG: 336 | End: 2019-02-28
Attending: SURGERY | Admitting: SURGERY
Payer: MEDICARE

## 2019-02-22 DIAGNOSIS — Z90.49 HISTORY OF HEMICOLECTOMY: ICD-10-CM

## 2019-02-22 DIAGNOSIS — E11.622 TYPE 2 DIABETES MELLITUS WITH OTHER SKIN ULCER, UNSPECIFIED WHETHER LONG TERM INSULIN USE: ICD-10-CM

## 2019-02-22 DIAGNOSIS — T14.8XXA DRAINAGE FROM WOUND: ICD-10-CM

## 2019-02-22 DIAGNOSIS — R10.32 LLQ PAIN: ICD-10-CM

## 2019-02-22 DIAGNOSIS — Z79.4 TYPE 2 DIABETES MELLITUS WITH OTHER SKIN ULCER, WITH LONG-TERM CURRENT USE OF INSULIN: ICD-10-CM

## 2019-02-22 DIAGNOSIS — L98.499 TYPE 2 DIABETES MELLITUS WITH OTHER SKIN ULCER, UNSPECIFIED WHETHER LONG TERM INSULIN USE: ICD-10-CM

## 2019-02-22 DIAGNOSIS — R11.0 NAUSEA: ICD-10-CM

## 2019-02-22 DIAGNOSIS — K56.609 SMALL BOWEL OBSTRUCTION: ICD-10-CM

## 2019-02-22 DIAGNOSIS — L02.211 ABSCESS OF ABDOMINAL WALL: ICD-10-CM

## 2019-02-22 DIAGNOSIS — E11.622 TYPE 2 DIABETES MELLITUS WITH OTHER SKIN ULCER, WITH LONG-TERM CURRENT USE OF INSULIN: ICD-10-CM

## 2019-02-22 DIAGNOSIS — E11.9 TYPE 2 DIABETES MELLITUS WITHOUT COMPLICATION, WITH LONG-TERM CURRENT USE OF INSULIN: ICD-10-CM

## 2019-02-22 DIAGNOSIS — K63.2 COLOCUTANEOUS FISTULA: ICD-10-CM

## 2019-02-22 DIAGNOSIS — A49.8 INFECTION DUE TO ESBL-PRODUCING ESCHERICHIA COLI: ICD-10-CM

## 2019-02-22 DIAGNOSIS — R10.84 GENERALIZED ABDOMINAL PAIN: ICD-10-CM

## 2019-02-22 DIAGNOSIS — K63.2 FISTULA OF INTESTINE, EXCLUDING RECTUM AND ANUS: ICD-10-CM

## 2019-02-22 DIAGNOSIS — T81.89XD SUTURE GRANULOMA, SUBSEQUENT ENCOUNTER: ICD-10-CM

## 2019-02-22 DIAGNOSIS — L02.211 ABDOMINAL WALL ABSCESS: ICD-10-CM

## 2019-02-22 DIAGNOSIS — E11.628 DIABETES WITH SKIN COMPLICATION: ICD-10-CM

## 2019-02-22 DIAGNOSIS — Z98.890 S/P EXPLORATORY LAPAROTOMY: ICD-10-CM

## 2019-02-22 DIAGNOSIS — I10 ESSENTIAL HYPERTENSION: ICD-10-CM

## 2019-02-22 DIAGNOSIS — Z79.4 TYPE 2 DIABETES MELLITUS WITHOUT COMPLICATION, WITH LONG-TERM CURRENT USE OF INSULIN: ICD-10-CM

## 2019-02-22 DIAGNOSIS — Z93.3 STATUS POST HARTMANN'S PROCEDURE: ICD-10-CM

## 2019-02-22 DIAGNOSIS — K63.2 COLONIC FISTULA: Primary | ICD-10-CM

## 2019-02-22 DIAGNOSIS — Z16.12 INFECTION DUE TO ESBL-PRODUCING ESCHERICHIA COLI: ICD-10-CM

## 2019-02-22 LAB
ANION GAP SERPL CALC-SCNC: 10 MMOL/L
ANION GAP SERPL CALC-SCNC: 11 MMOL/L
BASOPHILS # BLD AUTO: 0.01 K/UL
BASOPHILS # BLD AUTO: 0.02 K/UL
BASOPHILS NFR BLD: 0.1 %
BASOPHILS NFR BLD: 0.2 %
BUN SERPL-MCNC: 15 MG/DL
BUN SERPL-MCNC: 15 MG/DL
CALCIUM SERPL-MCNC: 9 MG/DL
CALCIUM SERPL-MCNC: 9.5 MG/DL
CHLORIDE SERPL-SCNC: 100 MMOL/L
CHLORIDE SERPL-SCNC: 98 MMOL/L
CO2 SERPL-SCNC: 27 MMOL/L
CO2 SERPL-SCNC: 27 MMOL/L
CREAT SERPL-MCNC: 1.1 MG/DL
CREAT SERPL-MCNC: 1.2 MG/DL
DIFFERENTIAL METHOD: ABNORMAL
DIFFERENTIAL METHOD: ABNORMAL
EOSINOPHIL # BLD AUTO: 0.1 K/UL
EOSINOPHIL # BLD AUTO: 0.2 K/UL
EOSINOPHIL NFR BLD: 1.2 %
EOSINOPHIL NFR BLD: 2.1 %
ERYTHROCYTE [DISTWIDTH] IN BLOOD BY AUTOMATED COUNT: 14.6 %
ERYTHROCYTE [DISTWIDTH] IN BLOOD BY AUTOMATED COUNT: 15 %
EST. GFR  (AFRICAN AMERICAN): 54 ML/MIN/1.73 M^2
EST. GFR  (AFRICAN AMERICAN): >60 ML/MIN/1.73 M^2
EST. GFR  (NON AFRICAN AMERICAN): 47 ML/MIN/1.73 M^2
EST. GFR  (NON AFRICAN AMERICAN): 52 ML/MIN/1.73 M^2
GLUCOSE SERPL-MCNC: 163 MG/DL
GLUCOSE SERPL-MCNC: 204 MG/DL
HCT VFR BLD AUTO: 28.6 %
HCT VFR BLD AUTO: 31.3 %
HGB BLD-MCNC: 8.8 G/DL
HGB BLD-MCNC: 9.8 G/DL
INR PPP: 1.1
LYMPHOCYTES # BLD AUTO: 1.4 K/UL
LYMPHOCYTES # BLD AUTO: 1.6 K/UL
LYMPHOCYTES NFR BLD: 13.6 %
LYMPHOCYTES NFR BLD: 18.5 %
MCH RBC QN AUTO: 23.8 PG
MCH RBC QN AUTO: 23.9 PG
MCHC RBC AUTO-ENTMCNC: 30.8 G/DL
MCHC RBC AUTO-ENTMCNC: 31.3 G/DL
MCV RBC AUTO: 76 FL
MCV RBC AUTO: 78 FL
MONOCYTES # BLD AUTO: 0.5 K/UL
MONOCYTES # BLD AUTO: 0.6 K/UL
MONOCYTES NFR BLD: 5.5 %
MONOCYTES NFR BLD: 5.6 %
NEUTROPHILS # BLD AUTO: 6.3 K/UL
NEUTROPHILS # BLD AUTO: 8.1 K/UL
NEUTROPHILS NFR BLD: 73.6 %
NEUTROPHILS NFR BLD: 79.4 %
PLATELET # BLD AUTO: 402 K/UL
PLATELET # BLD AUTO: 477 K/UL
PMV BLD AUTO: 7.7 FL
PMV BLD AUTO: 8.1 FL
POCT GLUCOSE: 208 MG/DL (ref 70–110)
POCT GLUCOSE: 234 MG/DL (ref 70–110)
POTASSIUM SERPL-SCNC: 3.3 MMOL/L
POTASSIUM SERPL-SCNC: 3.5 MMOL/L
PROTHROMBIN TIME: 11.1 SEC
RBC # BLD AUTO: 3.68 M/UL
RBC # BLD AUTO: 4.11 M/UL
SODIUM SERPL-SCNC: 136 MMOL/L
SODIUM SERPL-SCNC: 137 MMOL/L
WBC # BLD AUTO: 10.24 K/UL
WBC # BLD AUTO: 8.5 K/UL

## 2019-02-22 PROCEDURE — 52005 PR CYSTOURETHROSCOPY,URETER CATHETER: ICD-10-PCS | Mod: ,,, | Performed by: UROLOGY

## 2019-02-22 PROCEDURE — 25000003 PHARM REV CODE 250: Performed by: NURSE ANESTHETIST, CERTIFIED REGISTERED

## 2019-02-22 PROCEDURE — 85610 PROTHROMBIN TIME: CPT

## 2019-02-22 PROCEDURE — 76000 FLUOROSCOPY <1 HR PHYS/QHP: CPT | Mod: 26,59,, | Performed by: UROLOGY

## 2019-02-22 PROCEDURE — 25000003 PHARM REV CODE 250: Performed by: SURGERY

## 2019-02-22 PROCEDURE — 36000706: Performed by: SURGERY

## 2019-02-22 PROCEDURE — S0030 INJECTION, METRONIDAZOLE: HCPCS | Performed by: NURSE ANESTHETIST, CERTIFIED REGISTERED

## 2019-02-22 PROCEDURE — S5010 5% DEXTROSE AND 0.45% SALINE: HCPCS | Performed by: SURGERY

## 2019-02-22 PROCEDURE — 63600175 PHARM REV CODE 636 W HCPCS: Performed by: SURGERY

## 2019-02-22 PROCEDURE — C1758 CATHETER, URETERAL: HCPCS | Performed by: SURGERY

## 2019-02-22 PROCEDURE — 85025 COMPLETE CBC W/AUTO DIFF WBC: CPT | Mod: 91

## 2019-02-22 PROCEDURE — 80048 BASIC METABOLIC PNL TOTAL CA: CPT

## 2019-02-22 PROCEDURE — 76000 PR  FLUOROSCOPE EXAMINATION: ICD-10-PCS | Mod: 26,59,, | Performed by: UROLOGY

## 2019-02-22 PROCEDURE — C9290 INJ, BUPIVACAINE LIPOSOME: HCPCS | Performed by: SURGERY

## 2019-02-22 PROCEDURE — 71000033 HC RECOVERY, INTIAL HOUR: Performed by: SURGERY

## 2019-02-22 PROCEDURE — 74420 PR  X-RAY RETROGRADE PYELOGRAM: ICD-10-PCS | Mod: 26,,, | Performed by: UROLOGY

## 2019-02-22 PROCEDURE — 25500020 PHARM REV CODE 255: Performed by: SURGERY

## 2019-02-22 PROCEDURE — 25000003 PHARM REV CODE 250: Performed by: UROLOGY

## 2019-02-22 PROCEDURE — 71000039 HC RECOVERY, EACH ADD'L HOUR: Performed by: SURGERY

## 2019-02-22 PROCEDURE — 37000009 HC ANESTHESIA EA ADD 15 MINS: Performed by: SURGERY

## 2019-02-22 PROCEDURE — C1769 GUIDE WIRE: HCPCS | Performed by: SURGERY

## 2019-02-22 PROCEDURE — 74420 UROGRAPHY RTRGR +-KUB: CPT | Mod: 26,,, | Performed by: UROLOGY

## 2019-02-22 PROCEDURE — 36000707: Performed by: SURGERY

## 2019-02-22 PROCEDURE — 63600175 PHARM REV CODE 636 W HCPCS: Performed by: ANESTHESIOLOGY

## 2019-02-22 PROCEDURE — 37000008 HC ANESTHESIA 1ST 15 MINUTES: Performed by: SURGERY

## 2019-02-22 PROCEDURE — 63600175 PHARM REV CODE 636 W HCPCS: Performed by: NURSE ANESTHETIST, CERTIFIED REGISTERED

## 2019-02-22 PROCEDURE — 36415 COLL VENOUS BLD VENIPUNCTURE: CPT

## 2019-02-22 PROCEDURE — 52005 CYSTO W/URTRL CATHJ: CPT | Mod: ,,, | Performed by: UROLOGY

## 2019-02-22 PROCEDURE — 11000001 HC ACUTE MED/SURG PRIVATE ROOM

## 2019-02-22 RX ORDER — IBUPROFEN 200 MG
24 TABLET ORAL
Status: DISCONTINUED | OUTPATIENT
Start: 2019-02-22 | End: 2019-02-28 | Stop reason: HOSPADM

## 2019-02-22 RX ORDER — GLUCAGON 1 MG
1 KIT INJECTION
Status: DISCONTINUED | OUTPATIENT
Start: 2019-02-22 | End: 2019-02-28 | Stop reason: HOSPADM

## 2019-02-22 RX ORDER — HYDROCODONE BITARTRATE AND ACETAMINOPHEN 5; 325 MG/1; MG/1
1 TABLET ORAL EVERY 4 HOURS PRN
Status: DISCONTINUED | OUTPATIENT
Start: 2019-02-22 | End: 2019-02-28 | Stop reason: HOSPADM

## 2019-02-22 RX ORDER — METFORMIN HYDROCHLORIDE 500 MG/1
1000 TABLET ORAL 2 TIMES DAILY
Status: DISCONTINUED | OUTPATIENT
Start: 2019-02-22 | End: 2019-02-22

## 2019-02-22 RX ORDER — MIDAZOLAM HYDROCHLORIDE 1 MG/ML
INJECTION INTRAMUSCULAR; INTRAVENOUS
Status: DISCONTINUED | OUTPATIENT
Start: 2019-02-22 | End: 2019-02-22

## 2019-02-22 RX ORDER — ACETAMINOPHEN 325 MG/1
650 TABLET ORAL EVERY 8 HOURS PRN
Status: DISCONTINUED | OUTPATIENT
Start: 2019-02-22 | End: 2019-02-28 | Stop reason: HOSPADM

## 2019-02-22 RX ORDER — SODIUM CHLORIDE 0.9 % (FLUSH) 0.9 %
3 SYRINGE (ML) INJECTION
Status: DISCONTINUED | OUTPATIENT
Start: 2019-02-22 | End: 2019-02-22 | Stop reason: SDUPTHER

## 2019-02-22 RX ORDER — PHENYLEPHRINE HYDROCHLORIDE 10 MG/ML
INJECTION INTRAVENOUS
Status: DISCONTINUED | OUTPATIENT
Start: 2019-02-22 | End: 2019-02-22

## 2019-02-22 RX ORDER — ONDANSETRON 2 MG/ML
4 INJECTION INTRAMUSCULAR; INTRAVENOUS DAILY PRN
Status: DISCONTINUED | OUTPATIENT
Start: 2019-02-22 | End: 2019-02-22

## 2019-02-22 RX ORDER — IBUPROFEN 200 MG
16 TABLET ORAL
Status: DISCONTINUED | OUTPATIENT
Start: 2019-02-22 | End: 2019-02-28 | Stop reason: HOSPADM

## 2019-02-22 RX ORDER — LIDOCAINE HYDROCHLORIDE 20 MG/ML
JELLY TOPICAL
Status: DISCONTINUED | OUTPATIENT
Start: 2019-02-22 | End: 2019-02-22 | Stop reason: HOSPADM

## 2019-02-22 RX ORDER — DEXTROSE MONOHYDRATE AND SODIUM CHLORIDE 5; .45 G/100ML; G/100ML
INJECTION, SOLUTION INTRAVENOUS CONTINUOUS
Status: DISCONTINUED | OUTPATIENT
Start: 2019-02-22 | End: 2019-02-28 | Stop reason: HOSPADM

## 2019-02-22 RX ORDER — INSULIN ASPART 100 [IU]/ML
1-10 INJECTION, SOLUTION INTRAVENOUS; SUBCUTANEOUS
Status: DISCONTINUED | OUTPATIENT
Start: 2019-02-22 | End: 2019-02-28 | Stop reason: HOSPADM

## 2019-02-22 RX ORDER — ONDANSETRON 8 MG/1
8 TABLET, ORALLY DISINTEGRATING ORAL EVERY 6 HOURS PRN
Status: DISCONTINUED | OUTPATIENT
Start: 2019-02-22 | End: 2019-02-28 | Stop reason: HOSPADM

## 2019-02-22 RX ORDER — SODIUM CHLORIDE 0.9 % (FLUSH) 0.9 %
3 SYRINGE (ML) INJECTION
Status: DISCONTINUED | OUTPATIENT
Start: 2019-02-22 | End: 2019-02-28 | Stop reason: HOSPADM

## 2019-02-22 RX ORDER — CARVEDILOL 25 MG/1
25 TABLET ORAL DAILY
Status: DISCONTINUED | OUTPATIENT
Start: 2019-02-22 | End: 2019-02-28 | Stop reason: HOSPADM

## 2019-02-22 RX ORDER — EPHEDRINE SULFATE 50 MG/ML
INJECTION, SOLUTION INTRAVENOUS
Status: DISCONTINUED | OUTPATIENT
Start: 2019-02-22 | End: 2019-02-22

## 2019-02-22 RX ORDER — BUPIVACAINE HYDROCHLORIDE 2.5 MG/ML
INJECTION, SOLUTION EPIDURAL; INFILTRATION; INTRACAUDAL
Status: DISCONTINUED | OUTPATIENT
Start: 2019-02-22 | End: 2019-02-22 | Stop reason: HOSPADM

## 2019-02-22 RX ORDER — HYDROCHLOROTHIAZIDE 25 MG/1
25 TABLET ORAL
Status: DISCONTINUED | OUTPATIENT
Start: 2019-02-22 | End: 2019-02-22

## 2019-02-22 RX ORDER — FENTANYL CITRATE 50 UG/ML
INJECTION, SOLUTION INTRAMUSCULAR; INTRAVENOUS
Status: DISCONTINUED | OUTPATIENT
Start: 2019-02-22 | End: 2019-02-22

## 2019-02-22 RX ORDER — HYDROMORPHONE HYDROCHLORIDE 2 MG/ML
0.5 INJECTION, SOLUTION INTRAMUSCULAR; INTRAVENOUS; SUBCUTANEOUS EVERY 5 MIN PRN
Status: DISCONTINUED | OUTPATIENT
Start: 2019-02-22 | End: 2019-02-22

## 2019-02-22 RX ORDER — SODIUM CHLORIDE, SODIUM LACTATE, POTASSIUM CHLORIDE, CALCIUM CHLORIDE 600; 310; 30; 20 MG/100ML; MG/100ML; MG/100ML; MG/100ML
INJECTION, SOLUTION INTRAVENOUS CONTINUOUS PRN
Status: DISCONTINUED | OUTPATIENT
Start: 2019-02-22 | End: 2019-02-22

## 2019-02-22 RX ORDER — ROCURONIUM BROMIDE 10 MG/ML
INJECTION, SOLUTION INTRAVENOUS
Status: DISCONTINUED | OUTPATIENT
Start: 2019-02-22 | End: 2019-02-22

## 2019-02-22 RX ORDER — SUCCINYLCHOLINE CHLORIDE 20 MG/ML
INJECTION INTRAMUSCULAR; INTRAVENOUS
Status: DISCONTINUED | OUTPATIENT
Start: 2019-02-22 | End: 2019-02-22

## 2019-02-22 RX ORDER — RAMELTEON 8 MG/1
8 TABLET ORAL NIGHTLY PRN
Status: DISCONTINUED | OUTPATIENT
Start: 2019-02-22 | End: 2019-02-28 | Stop reason: HOSPADM

## 2019-02-22 RX ORDER — LIDOCAINE HYDROCHLORIDE 10 MG/ML
1 INJECTION, SOLUTION EPIDURAL; INFILTRATION; INTRACAUDAL; PERINEURAL ONCE
Status: DISCONTINUED | OUTPATIENT
Start: 2019-02-22 | End: 2019-02-27

## 2019-02-22 RX ORDER — BACITRACIN 50000 [IU]/1
INJECTION, POWDER, FOR SOLUTION INTRAMUSCULAR
Status: DISCONTINUED | OUTPATIENT
Start: 2019-02-22 | End: 2019-02-22 | Stop reason: HOSPADM

## 2019-02-22 RX ORDER — DICYCLOMINE HYDROCHLORIDE 10 MG/1
20 CAPSULE ORAL 3 TIMES DAILY
Status: DISCONTINUED | OUTPATIENT
Start: 2019-02-22 | End: 2019-02-28 | Stop reason: HOSPADM

## 2019-02-22 RX ORDER — HYDROCODONE BITARTRATE AND ACETAMINOPHEN 5; 325 MG/1; MG/1
1 TABLET ORAL EVERY 4 HOURS PRN
Status: DISCONTINUED | OUTPATIENT
Start: 2019-02-22 | End: 2019-02-22 | Stop reason: SDUPTHER

## 2019-02-22 RX ORDER — LIDOCAINE HCL/PF 100 MG/5ML
SYRINGE (ML) INTRAVENOUS
Status: DISCONTINUED | OUTPATIENT
Start: 2019-02-22 | End: 2019-02-22

## 2019-02-22 RX ORDER — DIPHENHYDRAMINE HYDROCHLORIDE 50 MG/ML
25 INJECTION INTRAMUSCULAR; INTRAVENOUS EVERY 4 HOURS PRN
Status: DISCONTINUED | OUTPATIENT
Start: 2019-02-22 | End: 2019-02-28 | Stop reason: HOSPADM

## 2019-02-22 RX ORDER — ONDANSETRON HYDROCHLORIDE 8 MG/1
8 TABLET, FILM COATED ORAL EVERY 6 HOURS PRN
Status: DISCONTINUED | OUTPATIENT
Start: 2019-02-22 | End: 2019-02-22 | Stop reason: SDUPTHER

## 2019-02-22 RX ORDER — ACETAMINOPHEN 325 MG/1
650 TABLET ORAL EVERY 4 HOURS PRN
Status: DISCONTINUED | OUTPATIENT
Start: 2019-02-22 | End: 2019-02-28 | Stop reason: HOSPADM

## 2019-02-22 RX ORDER — TRAMADOL HYDROCHLORIDE 50 MG/1
50 TABLET ORAL EVERY 6 HOURS PRN
Status: DISCONTINUED | OUTPATIENT
Start: 2019-02-22 | End: 2019-02-28 | Stop reason: HOSPADM

## 2019-02-22 RX ORDER — METRONIDAZOLE 500 MG/100ML
INJECTION, SOLUTION INTRAVENOUS
Status: DISCONTINUED | OUTPATIENT
Start: 2019-02-22 | End: 2019-02-22

## 2019-02-22 RX ORDER — PROPOFOL 10 MG/ML
VIAL (ML) INTRAVENOUS
Status: DISCONTINUED | OUTPATIENT
Start: 2019-02-22 | End: 2019-02-22

## 2019-02-22 RX ORDER — ONDANSETRON HYDROCHLORIDE 2 MG/ML
INJECTION, SOLUTION INTRAMUSCULAR; INTRAVENOUS
Status: DISCONTINUED | OUTPATIENT
Start: 2019-02-22 | End: 2019-02-22

## 2019-02-22 RX ORDER — HYDROCHLOROTHIAZIDE 25 MG/1
25 TABLET ORAL DAILY
Status: DISCONTINUED | OUTPATIENT
Start: 2019-02-22 | End: 2019-02-28 | Stop reason: HOSPADM

## 2019-02-22 RX ORDER — ONDANSETRON 8 MG/1
8 TABLET, ORALLY DISINTEGRATING ORAL EVERY 8 HOURS PRN
Status: DISCONTINUED | OUTPATIENT
Start: 2019-02-22 | End: 2019-02-28 | Stop reason: HOSPADM

## 2019-02-22 RX ADMIN — INSULIN ASPART 4 UNITS: 100 INJECTION, SOLUTION INTRAVENOUS; SUBCUTANEOUS at 05:02

## 2019-02-22 RX ADMIN — SODIUM CHLORIDE, SODIUM LACTATE, POTASSIUM CHLORIDE, AND CALCIUM CHLORIDE: .6; .31; .03; .02 INJECTION, SOLUTION INTRAVENOUS at 07:02

## 2019-02-22 RX ADMIN — INSULIN ASPART 2 UNITS: 100 INJECTION, SOLUTION INTRAVENOUS; SUBCUTANEOUS at 09:02

## 2019-02-22 RX ADMIN — SUGAMMADEX 200 MG: 100 INJECTION, SOLUTION INTRAVENOUS at 11:02

## 2019-02-22 RX ADMIN — FENTANYL CITRATE 50 MCG: 50 INJECTION, SOLUTION INTRAMUSCULAR; INTRAVENOUS at 10:02

## 2019-02-22 RX ADMIN — LIDOCAINE HYDROCHLORIDE 100 MG: 20 INJECTION, SOLUTION INTRAVENOUS at 09:02

## 2019-02-22 RX ADMIN — DEXTROSE AND SODIUM CHLORIDE: 5; .45 INJECTION, SOLUTION INTRAVENOUS at 02:02

## 2019-02-22 RX ADMIN — METRONIDAZOLE 500 MG: 500 SOLUTION INTRAVENOUS at 10:02

## 2019-02-22 RX ADMIN — PHENYLEPHRINE HYDROCHLORIDE 200 MCG: 10 INJECTION INTRAVENOUS at 10:02

## 2019-02-22 RX ADMIN — PHENYLEPHRINE HYDROCHLORIDE 100 MCG: 10 INJECTION INTRAVENOUS at 09:02

## 2019-02-22 RX ADMIN — SODIUM CHLORIDE, SODIUM LACTATE, POTASSIUM CHLORIDE, AND CALCIUM CHLORIDE: .6; .31; .03; .02 INJECTION, SOLUTION INTRAVENOUS at 11:02

## 2019-02-22 RX ADMIN — ONDANSETRON 8 MG: 2 INJECTION, SOLUTION INTRAMUSCULAR; INTRAVENOUS at 11:02

## 2019-02-22 RX ADMIN — FENTANYL CITRATE 50 MCG: 50 INJECTION, SOLUTION INTRAMUSCULAR; INTRAVENOUS at 11:02

## 2019-02-22 RX ADMIN — PROMETHAZINE HYDROCHLORIDE 6.25 MG: 25 INJECTION INTRAMUSCULAR; INTRAVENOUS at 02:02

## 2019-02-22 RX ADMIN — MIDAZOLAM HYDROCHLORIDE 2 MG: 1 INJECTION, SOLUTION INTRAMUSCULAR; INTRAVENOUS at 09:02

## 2019-02-22 RX ADMIN — ROCURONIUM BROMIDE 5 MG: 10 INJECTION, SOLUTION INTRAVENOUS at 09:02

## 2019-02-22 RX ADMIN — ONDANSETRON 4 MG: 2 INJECTION INTRAMUSCULAR; INTRAVENOUS at 01:02

## 2019-02-22 RX ADMIN — FENTANYL CITRATE 100 MCG: 50 INJECTION, SOLUTION INTRAMUSCULAR; INTRAVENOUS at 09:02

## 2019-02-22 RX ADMIN — HYDROCODONE BITARTRATE AND ACETAMINOPHEN 1 TABLET: 5; 325 TABLET ORAL at 06:02

## 2019-02-22 RX ADMIN — SODIUM CHLORIDE 2 G: 9 INJECTION, SOLUTION INTRAVENOUS at 09:02

## 2019-02-22 RX ADMIN — SUCCINYLCHOLINE CHLORIDE 120 MG: 20 INJECTION, SOLUTION INTRAMUSCULAR; INTRAVENOUS at 09:02

## 2019-02-22 RX ADMIN — HYDROMORPHONE HYDROCHLORIDE 0.5 MG: 2 INJECTION INTRAMUSCULAR; INTRAVENOUS; SUBCUTANEOUS at 12:02

## 2019-02-22 RX ADMIN — DICYCLOMINE HYDROCHLORIDE 20 MG: 10 CAPSULE ORAL at 09:02

## 2019-02-22 RX ADMIN — ROCURONIUM BROMIDE 45 MG: 10 INJECTION, SOLUTION INTRAVENOUS at 09:02

## 2019-02-22 RX ADMIN — ROCURONIUM BROMIDE 50 MG: 10 INJECTION, SOLUTION INTRAVENOUS at 10:02

## 2019-02-22 RX ADMIN — PROPOFOL 150 MG: 10 INJECTION, EMULSION INTRAVENOUS at 09:02

## 2019-02-22 RX ADMIN — EPHEDRINE SULFATE 10 MG: 50 INJECTION, SOLUTION INTRAMUSCULAR; INTRAVENOUS; SUBCUTANEOUS at 10:02

## 2019-02-22 NOTE — PLAN OF CARE
VN cued into pt's room for introduction. VN informed pt that VN would be working along side bedside nurse and PCT throughout shift. Level of present pain assessed. At present slight pain noted with good deal of nausea. Bedside nurse, Erma, applied cool wash cloth to throat area and admin anti-emetic. Discussed today's plan of care with patient. Discussed with patient High fall risk protocol and interventions that have been initiated and cont be in place for safety. Patient verbalized clear understanding and cooperation in English using teach back method. Bed alarm presently activated and in use. Will cont to be available to patient and intervene prn.

## 2019-02-22 NOTE — OP NOTE
[facility]  Operative Note    Date: 02/22/2019    Pre-Op Diagnosis:  Abdominal wall fistula    Post-Op Diagnosis: same    Procedure(s) Performed:   1.  Cystoscopy with left JJ ureteral stent placement  2.  Fluoroscopy < 1 hr  3.  Intraoperative independent interpretation of fluoroscopic images  4.  Cystoscopy with retrograde pyelography  5.  Insertion Higginbotham catheter    Specimen(s):  None     Surgeon: Ranjit De    Anesthesia: Monitored Local Anesthesia with Sedation    Indications: Azucena Morrison is a 68 y.o. female with abdominal wall fistula to be addressed by General surgery.  General surgery requests placement of left ureteral catheter to aid in identification of left ureter intraoperatively    Findings:   1.  Normal bladder and normal left ureter    Estimated Blood Loss: min    Drains:   1.  Five Iraqi open-ended left ureteral catheter    Procedure in Detail:  After risks, benefits and possible complications of the procedure were explained, the patient elected to undergo the procedure and informed consent was obtained. All questions were answered in the chauncey-operative area. The patient was transferred to the cystoscopy suite and placed on the fluoroscopy table in the supine position.   Time out was performed, chauncey-procedural antibiotics were given.  General anesthesia was administered.  After adequate anesthesia the patient was placed in dorsal lithotomy position and prepped and draped in the usual sterile fashion.     Using the Jud sounds patient's urethra was dilated up to size 24 Iraqi.  Next,  A rigid cystoscope in a 22 Fr sheath was introduced into the patients bladder per urethra. This passed easily.  The entire urethra was visualized and revealed no strictures or masses.  Formal cystoscopy was performed which showed the right and left ureteral orifices in the normal anatomic position.  There were stones tumors or abnormality of the bladder mucosa.      Our  attention was turned to the patients left ureteral orifice.  Using a 5 Citizen of [place] Rutner ureteral catheter, an occlusive left retrograde pyelogram is performed.  This shows no evidence of stones tumors or obstructions in left ureter left renal pelvis left calices.  Prompt drainage on drainage films.    Next a 0.038 floppy-tipped high wire is passed up the left ureteral orifice to the region of the left renal pelvis under fluoroscopic control.  Next a 5 Citizen of [place] open-ended ureteral catheter is passed over the wire to the region of the renal pelvis, again under fluoroscopic control.  The wire was then removed.  The cystoscope was then removed.    Next a 16 Citizen of [place] Higginbotham catheters passed per urethra into the bladder and 10 cc is placed into the balloon.  Next using a 14 Citizen of [place] Jelco, the stent is introduced into the Higginbotham catheter.  The ureteral stent is also secured to the Higginbotham catheter with 2 0 silk ties.    Higginbotham catheter and ureteral catheter placed to gravity drainage and the patient is then taken out of lithotomy position and transferred to the general surgery room for the general surgery portion of her procedure.    Follow up care:  Indwelling ureteral catheter to be removed by General surgery at their discretion     Ranjit De MD

## 2019-02-22 NOTE — TRANSFER OF CARE
"Anesthesia Transfer of Care Note    Patient: Azucena Morrison    Procedure(s) Performed: Procedure(s) (LRB):  LAPAROTOMY, EXPLORATORY (N/A)  COLECTOMY, LOW ANTERIOR (N/A)  CYSTOSCOPY, WITH RETROGRADE PYELOGRAM AND URETERAL STENT INSERTION with placement of a muir cath (Left)    Patient location: PACU    Anesthesia Type: general    Transport from OR: Transported from OR on 6-10 L/min O2 by face mask with adequate spontaneous ventilation    Post pain: adequate analgesia    Post assessment: no apparent anesthetic complications    Post vital signs: stable    Level of consciousness: awake and alert    Nausea/Vomiting: no nausea/vomiting    Complications: none    Transfer of care protocol was followed      Last vitals:   Visit Vitals  /72 (BP Location: Right arm, Patient Position: Lying)   Pulse 94   Temp 36.6 °C (97.9 °F) (Skin)   Resp 18   Ht 5' 2" (1.575 m)   Wt 76.7 kg (169 lb)   SpO2 96%   Breastfeeding? No   BMI 30.91 kg/m²     "

## 2019-02-22 NOTE — ANESTHESIA POSTPROCEDURE EVALUATION
"Anesthesia Post Evaluation    Patient: Azucena MENDOZA Morrison    Procedure(s) Performed: Procedure(s) (LRB):  LAPAROTOMY, EXPLORATORY (N/A)  COLECTOMY, LOW ANTERIOR (N/A)  CYSTOSCOPY, WITH RETROGRADE PYELOGRAM AND URETERAL STENT INSERTION with placement of a muir cath (Left)    Final Anesthesia Type: general  Patient location during evaluation: PACU  Patient participation: Yes- Able to Participate  Level of consciousness: awake and alert  Post-procedure vital signs: reviewed and stable  Pain management: adequate  Airway patency: patent  PONV status at discharge: No PONV  Anesthetic complications: no      Cardiovascular status: blood pressure returned to baseline  Respiratory status: unassisted  Hydration status: euvolemic  Follow-up not needed.        Visit Vitals  /72 (BP Location: Right arm, Patient Position: Lying)   Pulse 94   Temp 36.6 °C (97.9 °F) (Skin)   Resp 18   Ht 5' 2" (1.575 m)   Wt 76.7 kg (169 lb)   SpO2 96%   Breastfeeding? No   BMI 30.91 kg/m²       Pain/Patience Score: No Data Recorded      "

## 2019-02-22 NOTE — PLAN OF CARE
Problem: Adult Inpatient Plan of Care  Goal: Plan of Care Review  Outcome: Ongoing (interventions implemented as appropriate)  Pt up to floor from surgery. Midline incision with dressing in place; clean, dry and intact. complaints of nausea without emesis with Zofran and phenergan given. Blood sugar checked with insuline given per sliding scale. Minimal complaints of pain post op.

## 2019-02-22 NOTE — OP NOTE
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]  Abscess of abdominal wall [L02.211]    Post-op Diagnosis:  Post-Op Diagnosis Codes:     * Drainage from wound [T14.8XXA]     * History of hemicolectomy [Z90.49]     * Infection due to ESBL-producing Escherichia coli [A49.8, Z16.12]     * S/P colostomy [Z93.3]     * Small bowel obstruction [K56.609]     * 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    Description of the findings of the procedure: dense adhesion of small bowel stuck and matted in low pelvic area , decision made to close abdomen and continue present treatment     Estimated Blood Loss: 25 mL         Specimens:   Specimen (12h ago, onward)    None

## 2019-02-22 NOTE — ANESTHESIA PREPROCEDURE EVALUATION
2019     Was cancelled because potassium was 2.7 today it is 3.3.      Patient is Syriac speaking,  present during visit.    Needs PCP documentation of medical optimization.    Past Medical History:   Diagnosis Date    Diabetes mellitus     Diabetes mellitus, type 2     Hypertension      Past Surgical History:   Procedure Laterality Date     SECTION, CLASSIC      x2    CHOLECYSTECTOMY      CLOSURE-COLOSTOMY N/A 2016    [facility]    COLON SURGERY      [facility]    COLONOSCOPY N/A 2018    [facility] Gibran [facility]o [facility] at [facility] ENDO    COLOSTOMY Left     DEBRIDEMENT-WOUND-ABDOMINAL WALL N/A 2017    [facility] OR    EXPLORATORY-LAPAROTOMY N/A 2016    [facility] OR    INCISION, ABDOMINAL WALL N/A 8/10/2018    [facility] OR    INCISIONAL HERNIA REPAIR Right     LYSIS-ADHESION N/A 2016    [facility] OR    RESECTION - SMALL BOWEL and fistula resection  N/A 2016    [facility] OR    WOUND EXPLORATION N/A 2016    [facility] at [facility] OR       Anesthesia Evaluation      I have reviewed the Medications.     Review of Systems  Anesthesia Hx:  No problems with previous Anesthesia Denies Hx of Anesthetic complications Denies Family Hx of Anesthesia complications.   Denies Personal Hx of Anesthesia complications.   Social:  No Alcohol Use  Tobacco Use: , quit smoking >10 years ago   Hematology/Oncology:         -- Anemia:   Cardiovascular:   Hypertension Denies Dysrhythmias.   Denies Angina.    Pulmonary:  Pulmonary Normal  Denies Shortness of breath.    Renal/:   renal calculi     Hepatic/GI:  Hepatic/GI Normal  Denies GERD. Denies Liver Disease.    Neurological:  Neurology Normal Denies TIA.  Denies CVA. Denies Seizures.    Endocrine:  Diabetes, Type 2 Diabetes , controlled by oral hypoglycemics, insulin. Typical AM glucose range: 170 , most recent HgA1c value was 7.9 on 8/2018.        Physical Exam  General:  Obesity    Airway/Jaw/Neck:  Airway Findings: Mouth Opening: Normal Tongue: Normal  General Airway Assessment: Adult  Mallampati: II      Dental:  Dental Findings: Upper Dentures   Chest/Lungs:  Chest/Lungs Findings: Clear to auscultation, Normal Respiratory Rate     Heart/Vascular:  Heart Findings: Rate: Normal  Rhythm: Regular Rhythm  Sounds: Normal  Heart murmur: negative       Mental Status:  Mental Status Findings:  Cooperative, Alert and Oriented           Anesthesia Plan  Type of Anesthesia, risks & benefits discussed:  Anesthesia Type:  general  Patient's Preference:   Intra-op Monitoring Plan:   Intra-op Monitoring Plan Comments:   Post Op Pain Control Plan:   Post Op Pain Control Plan Comments:   Induction:   IV  Beta Blocker:         Informed Consent: Patient understands risks and agrees with Anesthesia plan.  Questions answered.   ASA Score: 3     Day of Surgery Review of History & Physical:        Anesthesia Plan Notes: Anesthesia consent to be signed prior to procedure on 1/25/19  Needs PCP documentation of medical optimization, patient aware and states that she will see him today, 1/9/19.          Ready For Surgery From Anesthesia Perspective.

## 2019-02-22 NOTE — OP NOTE
DATE OF PROCEDURE:  02/22/2019.    PREOPERATIVE DIAGNOSES:  Colocutaneous fistula, dense small bowel partial   obstruction, diabetes, hypertension.    POSTOPERATIVE DIAGNOSES:  Colocutaneous fistula, dense small bowel partial   obstruction, diabetes, hypertension.    PROCEDURES PERFORMED:  Exploratory lap, lysis of adhesion.  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  Further procedure was abandoned secondary to   possibility of creating more problems.  The present symptom this patient has is   of chronic fistula.  Procedure also included placement of left ureteral stent   and cystoscopy.    SURGEON:  Ulisses Horton M.D.    ASSISTANT:  Dr. Valdez.    ANESTHESIA:  General.    PROCEDURE IN DETAIL:  After satisfactory general anesthesia, the patient was   positioned.  Abdomen was prepped and draped in normal sterile manner using   Betadine scrub solution.  The patient was put in the lithotomy position with   possibly doing low anterior anastomosis through the rectal area.  A midline   incision was made from above the umbilicus in the mid epigastric area to the   suprapubic area.  An old scar was excised.  Subcutaneous tissue bleeders were   clamped and bovied.  Linea alba was incised.  The patient was found to have   transverse colon and small bowel stuck to the area.  Further dissection deeper   to both lateral flank area revealed dense adhesions of the small bowel and the   omentum to the anterior abdominal wall, preventing any further dissection   secondary to ____ cementing of the abdominal wall secondary to the inflammation   in the low pelvic area precluding any further dissection causing further damage   to the small bowel.  It was decided at the operating table, not to proceed any   further procedure.  Wound was irrigated with antibiotic solution and then closed   using running 2-0 PDS.  Skin was closed using skin staples.  The area of the   fistula was then left open and packed with iodoform  packing.  Sterile gauze   dressing was applied.  The instrument count, sponge count, and needle count was   correct.  The patient tolerated it well.  Estimated blood loss was 50 mL.  No   specimen was removed.  Postoperative diagnoses; colocutaneous fistula, dense   small bowel partial obstruction, diabetes, hypertension.      MS/IN  dd: 02/22/2019 12:07:05 (CST)  td: 02/22/2019 12:59:16 (CST)  Doc ID   #[id number]  Job ID #[id number]    CC:

## 2019-02-22 NOTE — H&P
"Patient ID: Azucena Morrison is a 68 y.o. female.     Chief Complaint: Non-healing Wound     6/29/17: Pt re-admit for distal abdominal wound. Pt denies any fevers or chills but states she feels nauseous at times. Returned to [place] June 28, from [place], reports much trouble with abdominal wound while in [place].  7/6/17-- Patient scheduled for surgical drainage of wound Tuesday 7/11/17DB  7/19/17-- Patient post op clinic visit.  8/16/17: CT of abdomen and pelvis done 8/9/17 due to suspected fistula  8/23/17-- U/S of abdomen done today.  12/13/17 Wound vac with 20cc drainage in clinic today.  1/10/18: on PO abx  1/24/18: Fistulagram ordered per Dr. Horton. Patient still on PO abx  02/21/18 Patient is not on antibiotics at this time. BS fasting 135 per patient report this am.  03/07/18 Culture of Anterior Abdominal Wound is positive. No antibiotics at this time.  fasting per patient report.    03/21/18 Patient c/o nausea along with abdominal tenderness near abd midline wound. Patient states that pain level is 6 and that she passed two sero-sanguineous clots from wound. Patient is afebrile this am.  3/28/18: patient continue antibiotics and reports that pain is less than last weeks clinic visit. Drainage has decreased as well  04/04/18 Patient states that abdominal pain is "pressure" sensation and that when she pushes down on her abdomen on either side of abdominal wound she feels like she has to urinate. Patient reports pain level of 3 this am.  fasting this am per patient report.   5/2/18: Patient had Abdomina Toribio today before wound care. She had discontinued Bactrim PO per Dr. Horton's recommendation and culture results and is now taking Amoxicillin PO  6/20/18: Pt reports itching to wound and periwound   6/27/18: patient reports no new complaints with regards to her wound or abdomen, wound continues to decrease in size and drainage  8/1/18: Pt reports increased pain to abdomen with nausea and emesis " since thursday 7/26/18, denies any fevers, patient did no go to ER as instructed by home health nurse on Sat. 7/28/18.  8/29/18: Patient admitted to hospital 8/2 for abdominal wound complications. Surgery for abdominal abscess per Dr. Horton on 8/3. Patient placed on IV antibiotics and discharged home on 8/18 with [facility]  and PICC line to LUE. Patient currently on IV ertapenem. IV medication sent to patients home per [facility].   9/5/18: IV Ertapenem to continue 1 week. Culture sent to lab. [facility]  sent orders. PICC line intact to LUE.  9/12/18 Antibiotics completed. Culture results pending. 1 deep stitch remaining.  9/12/18: Culture positive for E. Coli, patient to continue Ertapenem IV antibiotics x 2weeks. [facility] notified  9/26/18: F/U abdominal wound, wound continues to improve. Patient will complete IV antibiotics today  10/3/18: patient c/o diarrhea for 2 days and nausea with some vomiting. Labs and stool culture ordered. IV antibiotics discontinued today  10/10/2018 patient will start on IV antibiotic Invaz IV once a day, pending PICC line placement, order placed today  for PICC per Dr. Horton.  10/24/18: patient on IV antibiotics, tolerating well. Continue for 1 week  11/7/2018: IV Antibiotic discontinued.  11/8/2018: PICC line to left upper arm discontinued by Home Health.  11/21/2018 F/u for abdominal wall fistula , c/o nausea  No vomiting , no fever  12/5/2018: F/u for abdominal wall fisula, c/o gas, Dr. Horton will order Bentyl. Surgery schedule for January 2019.  12/12/2018: F/u for abdominal wall fistula, c/o abdominal pain. Dr. Horton ordered Norco 5/325mg tab 1 PO every 4 hours as needed for pain and referred patient for x-ray of abdomen after clinic today.  12/19/2018: F/u for abdominal wall fisula, c/o nausea, Dr. Horton re ordered Ondansetron (Zofran) 8mg one tab by mouth every eight hours as needed for nausea. No changes in wound condition from last visit noted in  clinic today.  12/26/2018: F/u abdominal wall fistula drainage, no c/o at this time. Denies any abdominal pain or nausea. Dr. Horton recommends surgery first week of January 2019 for abdominal fistula treatment and possible colostomy placement, patient agree.  01/02/2019: F/u abdominal wall fistula. Dressing with large amount of drainage, malodorous, Dr. Horton is scheduling surgery for third week of January, 2019.  1/16/19: F/U Dr. Horton today in clinic, surgery scheduled for next Friday 1/25/19 with Dr. Horton.     01/23/2019: Presents to wound care clinic for f/u abdominal wound care tx. Surgery scheduled with Dr. Horton for Friday 01/25/2019. Dr. Horton explained Mrs. Morrison surgery procedure including possible complications, Nurse  (Urdu) present, patient verbalizes understanding of procedure including possible complications, all questions from patient were answered by Dr. Horton including blood sugar and blood pressure medications per patient's concerns. Consent for surgery and blood transfusion signed by patient, Dr. Horton and nurse witnessed.  Abdominal wound cultures collected per Dr. Horton, cultures sent to lab/micro pending results. Dr. Horton prescribed the following orders: fleet enema for Thursday 1/24/2019, clear liquid diet and not to eat after midnight all for 1/24/2019. Start taking Neomycin and erythromycin by mouth three times a day (1/24/2019) and dulcolax one tab by mouth twice a day, Mrs. Morrison voices understanding.     01/30/2019: F/U wound care treatment with Dr. Horton. Per pt, primary physician Dr. Pj Monae ordered for her to take potassium pills for three days, last day today and scheduled for lab work at primary physician clinic on 1/31/2019. Per pt. Feeling better, no more abdominal pain (went to ER 1/24/2019 and discharged 1/25/2019 c/o abd pain.). Dr. Horton awaiting on primary clearance to re-schedule surgery, per pt. She will call wound care  "clinic and Dr. Horton to make aware of clearance day. Continue with same orders for dressing change for Dr. Horton. Mrs. Morrison requesting gentamicin refill.     02/06/2019: F/u with Dr. Horton for wound care treatment. Mrs. Morrison brought to clinic a clearance for surgery by Dr. Pj Sanches dated 02/06/2019 "Hyperkalemia-Resolved K 4.8 2/1/2019, labs , Cr. 1.05, H/H 10/30. No contraindication to surgery, tight control of BS, Hydration." A copy of EKG (1/24/2019) and lab work from [facility] collected 1/31/2019, reported results 2/1/2019. Dr. Horton scheduled surgery for 02/22/2019, consent were signed on 01/23/2019, all questions were answered by Dr. Horton, this nurse  (Sammarinese) present. Education provided to patient on the importance of having a clear liquid diet the day before surgery 02/21/2019 as per Dr. Horton, new medications and preop preparation before surgery, verbalizes understanding. Dr. Horton ordered Norco 5/325 mg PO for pain, Dulcolax two tabs by mouth to take on 02/21/2019, Soap suds enema (SSE) on Thursday 02/21/2019, Golytely by mouth 2 liters on 2/21/2019, Erythromycin 500 mg one tab by mouth three times a day and Neomycin 1 gm by mouth three times a day.        Review of Systems   Constitutional: Negative.    HENT: Negative.    Eyes: Negative.    Respiratory: Negative.    Cardiovascular: Negative.    Gastrointestinal: Negative.    Genitourinary: Negative.    Musculoskeletal: Negative.    Skin: Negative.    Neurological: Negative.    Psychiatric/Behavioral: Negative.        Objective:   Physical Exam   Constitutional: She is oriented to person, place, and time. She appears well-developed and well-nourished.   HENT:   Head: Normocephalic.   Eyes: Conjunctivae and EOM are normal. Pupils are equal, round, and reactive to light.   Neck: Normal range of motion. Neck supple.   Cardiovascular: Normal rate, regular rhythm, normal heart sounds and intact distal pulses. "   Pulmonary/Chest: Effort normal and breath sounds normal.   Abdominal: Soft. Bowel sounds are normal.   Musculoskeletal: Normal range of motion.   Neurological: She is alert and oriented to person, place, and time. She has normal reflexes.   Skin: Skin is warm and dry.       Assessment:       No diagnosis found.            Wound 10/31/18 0836 Abscess medial Abdomen #1 (Active)   10/31/18 0836    Pre-existing: Yes   Primary Wound Type: Abscess   Side:    Orientation: medial   Location: Abdomen   Wound/PI Number (optional): #1   Ankle-Brachial Index:    Pulses:    Removal Indication and Assessment:    Wound Outcome:    (Retired) Wound Type:    (Retired) Wound Length (cm):    (Retired) Wound Width (cm):    (Retired) Depth (cm):    Wound Description (Comments):    Removal Indications:    Wound Image   2/6/2019 10:30 AM   Dressing Appearance Moist drainage 2/6/2019 10:30 AM   Drainage Amount Moderate 2/6/2019 10:30 AM   Drainage Characteristics/Odor Serosanguineous;Creamy 2/6/2019 10:30 AM   Appearance Pink;Red;Yellow;Smooth 2/6/2019 10:30 AM   Tissue loss description Partial thickness 2/6/2019 10:30 AM   Red (%), Wound Tissue Color 80 % 2/6/2019 10:30 AM   Yellow (%), Wound Tissue Color 20 % 2/6/2019 10:30 AM   Periwound Area Intact;Moist;Redness 2/6/2019 10:30 AM   Wound Edges Approximated 2/6/2019 10:30 AM   Wound Length (cm) 0.2 cm 2/6/2019 10:30 AM   Wound Width (cm) 0.2 cm 2/6/2019 10:30 AM   Wound Depth (cm) 3.8 cm 2/6/2019 10:30 AM   Wound Volume (cm^3) 0.15 cm^3 2/6/2019 10:30 AM   Wound Surface Area (cm^2) 0.04 cm^2 2/6/2019 10:30 AM   Care Cleansed with:;Sterile normal saline 2/6/2019 10:30 AM   Dressing Applied;Island/border;Gauze 2/6/2019 10:30 AM   Dressing Change Due 02/08/19 2/6/2019 10:30 AM          Wound #1 Anterior Abdomen - midline  Dressings & Wound Care  Cleanse wound with Normal Saline. - Cavion or benzoin to periwound   betamethasone periwoind prn itching.  Antimicrobial Ointment/Cream. -  applied Gentamycin to wound bed, iodoform gauze over wound, cover with aquacel extra or 4x4 gauze, secure with 5x5 aqacel border and mefix tape.   Do not probe depth of wound  Change dressing HH on Mondays and Fridays. Wound care clinic on Wednesdays.      Plan:             Follow-up in about 1 week (around 2/13/2019).  Take medications as ordered, follow preop orders.  Surgery on 02/22/2019 by Dr. Horton           will start on bowel prep on 21 st February.      for surgery today , seen by Andrew for ureteral catheter placement.

## 2019-02-22 NOTE — BRIEF OP NOTE
[facility]  Brief Operative Note    SUMMARY     Surgery Date: 2/22/2019     Surgeon(s) and Role:     * Ulisses Horton MD - Primary    Assisting Surgeon: None    Pre-op Diagnosis:  Drainage from wound [T14.8XXA]  History of hemicolectomy [Z90.49]  Infection due to ESBL-producing Escherichia coli [A49.8, Z16.12]  S/P colostomy [Z93.3]  Small bowel obstruction [K56.609]  Colonic fistula [K63.2]  Abscess of abdominal wall [L02.211]    Post-op Diagnosis:  Post-Op Diagnosis Codes:     * Drainage from wound [T14.8XXA]     * History of hemicolectomy [Z90.49]     * Infection due to ESBL-producing Escherichia coli [A49.8, Z16.12]     * S/P colostomy [Z93.3]     * Small bowel obstruction [K56.609]     * Colonic fistula [K63.2]     * Abscess of abdominal wall [L02.211]    Procedure(s) (LRB):  LAPAROTOMY, EXPLORATORY (N/A)  COLECTOMY, LOW ANTERIOR (N/A)  CYSTOSCOPY, WITH RETROGRADE PYELOGRAM AND URETERAL STENT INSERTION with placement of a muir cath (Left)    Anesthesia: General    Description of Procedure:  Per General surgery    Description of the findings of the procedure:  Per General surgery    Estimated Blood Loss: * No values recorded between 2/22/2019  9:48 AM and 2/22/2019 10:50 AM *         Specimens:   Specimen (12h ago, onward)    None      [facility]  Brief Operative Note    SUMMARY     Surgery Date: 2/22/2019     Surgeon(s) and Role:     * Ranjit De    Assisting Surgeon: None    Pre-op Diagnosis:  Drainage from wound [T14.8XXA]  History of hemicolectomy [Z90.49]  Infection due to ESBL-producing Escherichia coli [A49.8, Z16.12]  S/P colostomy [Z93.3]  Small bowel obstruction [K56.609]  Colonic fistula [K63.2]  Abscess of abdominal wall [L02.211]    Post-op Diagnosis:  Post-Op Diagnosis Codes:     * Drainage from wound [T14.8XXA]     * History of hemicolectomy [Z90.49]     * Infection due to ESBL-producing Escherichia coli [A49.8, Z16.12]     * S/P colostomy  [Z93.3]     * Small bowel obstruction [K56.609]     * Colonic fistula [K63.2]     * Abscess of abdominal wall [L02.211]    Procedure(s) (LRB):  LAPAROTOMY, EXPLORATORY (N/A)  COLECTOMY, LOW ANTERIOR (N/A)  CYSTOSCOPY, WITH RETROGRADE PYELOGRAM AND URETERAL STENT INSERTION with placement of a muir cath (Left)    Anesthesia: General    Description of Procedure:  Urology portion of procedure: Cystoscopy, left retrograde pyelography, insertion 5 Burmese ureteral catheter, insertion of Muir catheter.  General surgery portion of procedure as per Dr. Horton    Description of the findings of the procedure:  Normal appearing bladder and left ureter    Estimated Blood Loss:  None         Specimens:   Specimen (12h ago, onward)    None

## 2019-02-22 NOTE — OP NOTE
DATE OF ADMISSION:  02/22/2019    DATE OF DISCHARGE:  02/28/2019.    HISTORY OF PRESENT ILLNESS:  This 68-year-old female, known diabetic,   hypertensive with chronic small-bowel obstruction, colocutaneous fistula, was   being followed at the [facility].  The patient was admitted for possible   exploratory lap for bowel resection and resection of the colorectal fistula.    The patient was put on bowel prep, was brought in as an outpatient and then   admitted.  The patient underwent exploratory lap, lysis of adhesion and attempts   were made to take down the colocutaneous fistula with lysis of adhesion.  The   patient was found to have practically cemented small bowel and the colon in the   low pelvic area, which was very difficult to delineate all the small bowel with   potential of creating multiple fistula and at this point, the surgery was deemed   impossible.  Discussion was held with assisting surgeon, Dr. Pope, and   mutual agreement was not to proceed any further with proceeding with resection   of the bowel and ended up multiple fistulas and causing more damage to the small   bowel and the large bowel.  At this point, it was decided to close the abdomen.    The patient was closed and then sent to the Recovery Room.  The patient was   then treated with n.p.o., IV fluids and IV antibiotics.  The patient's abdominal   wound drainage was minimal and gradually started decreasing and the patient was   started on a diet and continued home medication and PICC line was placed for   continued IV antibiotics at home and the wound care has an outpatient.  The   patient was explained the findings with the help of a Persian-speaking nurse,   Azucena from the Wound Center of the intraoperative finding and causing potential   damage further to the bowel.  The patient consented and agreed to continue with   the present treatment with IV antibiotics for at least 4 to 6 weeks and to be   content with since the patient was  still eating, moving bowels.  No more fever.    The patient was out of bed, ambulating.  Arrangements were made for home health   care and to be seen in the Wound Center for continued wound care.    POSTOPERATIVE DIAGNOSES:  Bowel obstruction, colocutaneous fistula, diabetes,   chronic small bowel obstruction, obesity, and hypertension.      MS/HN  dd: 03/01/2019 14:48:13 (CST)  td: 03/02/2019 00:51:43 (CST)  Doc ID   #[id number]  Job ID #[id number]    CC:     [id number]

## 2019-02-23 LAB
POCT GLUCOSE: 157 MG/DL (ref 70–110)
POCT GLUCOSE: 181 MG/DL (ref 70–110)
POCT GLUCOSE: 188 MG/DL (ref 70–110)
POCT GLUCOSE: 205 MG/DL (ref 70–110)

## 2019-02-23 PROCEDURE — 94761 N-INVAS EAR/PLS OXIMETRY MLT: CPT

## 2019-02-23 PROCEDURE — S5571 INSULIN DISPOS PEN 3 ML: HCPCS | Performed by: SURGERY

## 2019-02-23 PROCEDURE — 63600175 PHARM REV CODE 636 W HCPCS: Performed by: SURGERY

## 2019-02-23 PROCEDURE — 11000001 HC ACUTE MED/SURG PRIVATE ROOM

## 2019-02-23 PROCEDURE — 25000003 PHARM REV CODE 250: Performed by: SURGERY

## 2019-02-23 RX ADMIN — DICYCLOMINE HYDROCHLORIDE 20 MG: 10 CAPSULE ORAL at 08:02

## 2019-02-23 RX ADMIN — CARVEDILOL 25 MG: 25 TABLET, FILM COATED ORAL at 08:02

## 2019-02-23 RX ADMIN — PROMETHAZINE HYDROCHLORIDE 6.25 MG: 25 INJECTION INTRAMUSCULAR; INTRAVENOUS at 08:02

## 2019-02-23 RX ADMIN — HYDROCODONE BITARTRATE AND ACETAMINOPHEN 1 TABLET: 5; 325 TABLET ORAL at 04:02

## 2019-02-23 RX ADMIN — HYDROCHLOROTHIAZIDE 25 MG: 25 TABLET ORAL at 08:02

## 2019-02-23 RX ADMIN — INSULIN ASPART 2 UNITS: 100 INJECTION, SOLUTION INTRAVENOUS; SUBCUTANEOUS at 10:02

## 2019-02-23 RX ADMIN — INSULIN ASPART 2 UNITS: 100 INJECTION, SOLUTION INTRAVENOUS; SUBCUTANEOUS at 06:02

## 2019-02-23 RX ADMIN — HYDROCODONE BITARTRATE AND ACETAMINOPHEN 1 TABLET: 5; 325 TABLET ORAL at 07:02

## 2019-02-23 RX ADMIN — INSULIN DETEMIR 8 UNITS: 100 INJECTION, SOLUTION SUBCUTANEOUS at 08:02

## 2019-02-23 RX ADMIN — HYDROCODONE BITARTRATE AND ACETAMINOPHEN 1 TABLET: 5; 325 TABLET ORAL at 03:02

## 2019-02-23 RX ADMIN — HYDROCODONE BITARTRATE AND ACETAMINOPHEN 1 TABLET: 5; 325 TABLET ORAL at 11:02

## 2019-02-23 RX ADMIN — DICYCLOMINE HYDROCHLORIDE 20 MG: 10 CAPSULE ORAL at 04:02

## 2019-02-23 RX ADMIN — HYDROCODONE BITARTRATE AND ACETAMINOPHEN 1 TABLET: 5; 325 TABLET ORAL at 08:02

## 2019-02-23 NOTE — PLAN OF CARE
VN note: VN cued into pt's room for introduction. VN informed pt that VN would be working closely along side bedside nurse, PCT, and the rest of care team and making rounds throughout the shift. Pt verbalized understanding. Allowed time for questions. VN will continue to be available to patient and intervene prn.      Nurse Bethany reports patient denies any nausea or vomiting, and she would like her diet advanced. VN spoke with Dr. Horton. He reported ok to advance as tolerated. VN also confirmed with patient. She would like a soft diet for lunch, to see how she tolerates it.        02/23/19 1104   Type of Frequent Check   Type Patient Rounds;Other (see comments)  (VN Rounds)   Safety/Activity   Patient Rounds bed in low position;visualized patient   Safety Promotion/Fall Prevention instructed to call staff for mobility;side rails raised x 2   Activity Management activity adjusted per tolerance   Positioning   Body Position supine   Head of Bed (HOB) HOB at 30-45 degrees   Assessments (Pre/Post)   Level of Consciousness (AVPU) alert

## 2019-02-23 NOTE — PLAN OF CARE
VN note: VN cued into patient's room. She informed VN she tolerated lunch, and she would like a regular diet for dinner. VN will order regular diet for dinner. Will update nurse as well.      02/23/19 4266   Type of Frequent Check   Type Patient Rounds;Other (see comments)  (VN Rounds)   Safety/Activity   Patient Rounds bed in low position;visualized patient   Safety Promotion/Fall Prevention instructed to call staff for mobility;side rails raised x 2   Activity Management activity adjusted per tolerance   Positioning   Body Position supine   Head of Bed (HOB) HOB at 30-45 degrees   Assessments (Pre/Post)   Level of Consciousness (AVPU) alert

## 2019-02-23 NOTE — PLAN OF CARE
Problem: Adult Inpatient Plan of Care  Goal: Plan of Care Review  Outcome: Ongoing (interventions implemented as appropriate)  Pt AAOx4. Pt complaints of abdominal pain with moderate relief from PRN norco. D5 1/2 NS infusing at 125 ml/hr. Pt ambulating to the toilet with stand by assist. Safety maintained. Blood glucose monitoring continued. Bed locked in lowest position, bed alarm set and call bell within reach. Pt verbalized understanding to call for any needs or assistance. Will continue to monitor.

## 2019-02-23 NOTE — PROGRESS NOTES
"Surgery follow up  /72   Pulse 68   Temp 97.9 °F (36.6 °C)   Resp 18   Ht 5' 2" (1.575 m)   Wt 76.8 kg (169 lb 5 oz)   SpO2 95%   Breastfeeding? No   BMI 30.97 kg/m²   I/O last 3 completed shifts:  In: 3840.8 [P.O.:720; I.V.:3070.8; IV Piggyback:50]  Out: 1380 [Urine:1255; Other:100; Blood:25]  No intake/output data recorded.  Recent Results (from the past 336 hour(s))   CBC auto differential    Collection Time: 02/22/19  3:05 PM   Result Value Ref Range    WBC 10.24 3.90 - 12.70 K/uL    Hemoglobin 8.8 (L) 12.0 - 16.0 g/dL    Hematocrit 28.6 (L) 37.0 - 48.5 %    Platelets 477 (H) 150 - 350 K/uL   CBC auto differential    Collection Time: 02/22/19  7:25 AM   Result Value Ref Range    WBC 8.50 3.90 - 12.70 K/uL    Hemoglobin 9.8 (L) 12.0 - 16.0 g/dL    Hematocrit 31.3 (L) 37.0 - 48.5 %    Platelets 402 (H) 150 - 350 K/uL     Recent Results (from the past 336 hour(s))   Basic metabolic panel    Collection Time: 02/22/19  3:05 PM   Result Value Ref Range    Sodium 137 136 - 145 mmol/L    Potassium 3.5 3.5 - 5.1 mmol/L    Chloride 100 95 - 110 mmol/L    CO2 27 23 - 29 mmol/L    BUN, Bld 15 8 - 23 mg/dL    Creatinine 1.1 0.5 - 1.4 mg/dL    Calcium 9.0 8.7 - 10.5 mg/dL    Anion Gap 10 8 - 16 mmol/L   Basic metabolic panel    Collection Time: 02/22/19  7:25 AM   Result Value Ref Range    Sodium 136 136 - 145 mmol/L    Potassium 3.3 (L) 3.5 - 5.1 mmol/L    Chloride 98 95 - 110 mmol/L    CO2 27 23 - 29 mmol/L    BUN, Bld 15 8 - 23 mg/dL    Creatinine 1.2 0.5 - 1.4 mg/dL    Calcium 9.5 8.7 - 10.5 mg/dL    Anion Gap 11 8 - 16 mmol/L   mild draiange abdominal wound packing in place , continue present treatment.  "

## 2019-02-23 NOTE — PLAN OF CARE
Problem: Adult Inpatient Plan of Care  Goal: Plan of Care Review  Outcome: Ongoing (interventions implemented as appropriate)  Patient awake and alert. Complaints of abdominal pain with relief from PRN norco. Midline abd incision with moist dressing, dressing changed today. Clean, dry, and intact. Fluids running per orders. Diabetic diet, tolerated well. Contact isolation status initiated for ESBL in abd wound. Ambulatory independently. Blood glucose monitored with sliding scale given as needed. Safety maintained. Bed locked and in lowest position. Call light and personal items within reach.

## 2019-02-24 LAB
POCT GLUCOSE: 197 MG/DL (ref 70–110)
POCT GLUCOSE: 199 MG/DL (ref 70–110)
POCT GLUCOSE: 202 MG/DL (ref 70–110)
POCT GLUCOSE: 219 MG/DL (ref 70–110)

## 2019-02-24 PROCEDURE — 94761 N-INVAS EAR/PLS OXIMETRY MLT: CPT

## 2019-02-24 PROCEDURE — 25000003 PHARM REV CODE 250: Performed by: SURGERY

## 2019-02-24 PROCEDURE — 11000001 HC ACUTE MED/SURG PRIVATE ROOM

## 2019-02-24 PROCEDURE — 63600175 PHARM REV CODE 636 W HCPCS: Performed by: SURGERY

## 2019-02-24 PROCEDURE — S5010 5% DEXTROSE AND 0.45% SALINE: HCPCS | Performed by: SURGERY

## 2019-02-24 RX ADMIN — PIPERACILLIN AND TAZOBACTAM 4.5 G: 4; .5 INJECTION, POWDER, LYOPHILIZED, FOR SOLUTION INTRAVENOUS; PARENTERAL at 10:02

## 2019-02-24 RX ADMIN — INSULIN ASPART 2 UNITS: 100 INJECTION, SOLUTION INTRAVENOUS; SUBCUTANEOUS at 10:02

## 2019-02-24 RX ADMIN — CARVEDILOL 25 MG: 25 TABLET, FILM COATED ORAL at 08:02

## 2019-02-24 RX ADMIN — DEXTROSE AND SODIUM CHLORIDE: 5; .45 INJECTION, SOLUTION INTRAVENOUS at 12:02

## 2019-02-24 RX ADMIN — HYDROCODONE BITARTRATE AND ACETAMINOPHEN 1 TABLET: 5; 325 TABLET ORAL at 06:02

## 2019-02-24 RX ADMIN — DICYCLOMINE HYDROCHLORIDE 20 MG: 10 CAPSULE ORAL at 10:02

## 2019-02-24 RX ADMIN — HYDROCODONE BITARTRATE AND ACETAMINOPHEN 1 TABLET: 5; 325 TABLET ORAL at 12:02

## 2019-02-24 RX ADMIN — DICYCLOMINE HYDROCHLORIDE 20 MG: 10 CAPSULE ORAL at 08:02

## 2019-02-24 RX ADMIN — HYDROCODONE BITARTRATE AND ACETAMINOPHEN 1 TABLET: 5; 325 TABLET ORAL at 08:02

## 2019-02-24 RX ADMIN — PIPERACILLIN AND TAZOBACTAM 4.5 G: 4; .5 INJECTION, POWDER, LYOPHILIZED, FOR SOLUTION INTRAVENOUS; PARENTERAL at 12:02

## 2019-02-24 RX ADMIN — INSULIN ASPART 4 UNITS: 100 INJECTION, SOLUTION INTRAVENOUS; SUBCUTANEOUS at 06:02

## 2019-02-24 RX ADMIN — DICYCLOMINE HYDROCHLORIDE 20 MG: 10 CAPSULE ORAL at 03:02

## 2019-02-24 RX ADMIN — HYDROCHLOROTHIAZIDE 25 MG: 25 TABLET ORAL at 08:02

## 2019-02-24 RX ADMIN — INSULIN DETEMIR 8 UNITS: 100 INJECTION, SOLUTION SUBCUTANEOUS at 08:02

## 2019-02-24 RX ADMIN — HYDROCODONE BITARTRATE AND ACETAMINOPHEN 1 TABLET: 5; 325 TABLET ORAL at 04:02

## 2019-02-24 NOTE — PLAN OF CARE
02/23/19 2040   Discharge Assessment   Assessment Type Discharge Planning Assessment   Confirmed/corrected address and phone number on facesheet? Yes   Prior to hospitilization cognitive status: Alert/Oriented   Prior to hospitalization functional status: Independent   Current cognitive status: Alert/Oriented   Current Functional Status: Independent   Facility Arrived From: home   Lives With grandchild(felicitas);child(felicitas), adult   Able to Return to Prior Arrangements yes   Is patient able to care for self after discharge? Yes   Who are your caregiver(s) and their phone number(s)? Andry Nickerson(son)[phone]   Patient's perception of discharge disposition home health  (Pt states she's current with [facility] )   Readmission Within the Last 30 Days planned readmission  (The pt's last surgery was canceled for low potassium)   If yes, most recent facility name: [facility] 1-25-19   Patient currently being followed by outpatient case management? No   Patient currently receives any other outside agency services? No   Equipment Currently Used at Home walker, rolling   Do you have any problems affording any of your prescribed medications? No   Is the patient taking medications as prescribed? yes   Does the patient have transportation home? Yes   Transportation Anticipated family or friend will provide   Does the patient receive services at the [facility]? No   Discharge Plan A Home Health   DME Needed Upon Discharge  (TBD)   Patient/Family in Agreement with Plan yes   Readmission Questionnaire   At the time of your discharge, did someone talk to you about what your health problems were? Yes   At the time of discharge, did someone talk to you about what to watch out for regarding worsening of your health problem? Yes   At the time of discharge, did someone talk to you about what to do if you experienced worsening of your health problem? Yes   At the time of discharge, did someone talk to you about which medication to take  when you left the hospital and which ones to stop taking? Yes   At the time of discharge, did someone talk to you about when and where to follow up with a doctor after you left the hospital? Yes   What do you believe caused you to be sick enough to be re-admitted? planned readmit   How often do you need to have someone help you when you read instructions, pamphlets, or other written material from your doctor or pharmacy? Sometimes   Do you have problems taking your medications as prescribed? Yes   Do you have any problems affording any of  your prescribed medications? No   Do you have problems obtaining/receiving your medications? No   Does the patient have transportation to healthcare appointments? Yes   Living Arrangements house   Does the patient have family/friends to help with healtcare needs after discharge? yes   Does your caregiver provide all the help you need? Yes   Are you currently feeling confused? No   Are you currently having problems thinking? No   Are you currently having memory problems? No   Have you felt down, depressed, or hopeless? 0   Have you felt little interest or pleasure in doing things? 0   In the last 7 days, my sleep quality was: poor   Patient ID: Azucena Morrison is a 68 y.o. female.     Chief Complaint: Non-healing Wound    The Sw met with the pt to complete the assessment. The pt's independent with her adl's and has a rolling walker at home. The pt has transportation home at d/c. The pt states she's current with [facility]. The pt was discharged from [facility] 1-25-19. This was a planned readmit b/c her last surgery was canceled for low potassium. The Sw left her contact info on the white board in the pt's room. The Sw encouraged her to call should she have any questions or concerns.

## 2019-02-24 NOTE — PLAN OF CARE
VN note: VN cued into pt's room for introduction. VN informed pt that VN would be working closely along side bedside nurse, PCT, and the rest of care team and making rounds throughout the shift. Pt verbalized understanding. Allowed time for questions. VN will continue to be available to patient and intervene prn.       02/24/19 1101   Type of Frequent Check   Type Patient Rounds;Other (see comments)  (VN Rounds)   Safety/Activity   Patient Rounds bed in low position;visualized patient   Safety Promotion/Fall Prevention instructed to call staff for mobility;side rails raised x 2;nonskid shoes/socks when out of bed   Activity Management activity adjusted per tolerance   Positioning   Body Position positioned/repositioned independently   Head of Bed (HOB) HOB at 30-45 degrees   Assessments (Pre/Post)   Level of Consciousness (AVPU) alert

## 2019-02-24 NOTE — PLAN OF CARE
Problem: Adult Inpatient Plan of Care  Goal: Plan of Care Review  Outcome: Ongoing (interventions implemented as appropriate)  Pt AAOx4. Pt complaints of abdominal pain with moderate relief from PRN norco. IVF infusing at 125 ml/hr. Pt ambulating to toilet with stand-by assist. Safety maintained. Midline abdominal dressing remains CDI. Contact precautions maintained. Blood glucose monitoring continued. Bed locked in lowest position, bed alarm set and call bell within reach. Pt verbalized understanding to call for any needs or assistance. Will continue to monitor.

## 2019-02-25 LAB
ANION GAP SERPL CALC-SCNC: 10 MMOL/L
BASOPHILS # BLD AUTO: 0.01 K/UL
BASOPHILS NFR BLD: 0.1 %
BUN SERPL-MCNC: 8 MG/DL
CALCIUM SERPL-MCNC: 8.9 MG/DL
CHLORIDE SERPL-SCNC: 99 MMOL/L
CO2 SERPL-SCNC: 27 MMOL/L
CREAT SERPL-MCNC: 1.1 MG/DL
DIFFERENTIAL METHOD: ABNORMAL
EOSINOPHIL # BLD AUTO: 0.4 K/UL
EOSINOPHIL NFR BLD: 5 %
ERYTHROCYTE [DISTWIDTH] IN BLOOD BY AUTOMATED COUNT: 14.4 %
EST. GFR  (AFRICAN AMERICAN): >60 ML/MIN/1.73 M^2
EST. GFR  (NON AFRICAN AMERICAN): 52 ML/MIN/1.73 M^2
GLUCOSE SERPL-MCNC: 182 MG/DL
HCT VFR BLD AUTO: 27.6 %
HGB BLD-MCNC: 8.6 G/DL
LYMPHOCYTES # BLD AUTO: 1.6 K/UL
LYMPHOCYTES NFR BLD: 20.3 %
MCH RBC QN AUTO: 24 PG
MCHC RBC AUTO-ENTMCNC: 31.2 G/DL
MCV RBC AUTO: 77 FL
MONOCYTES # BLD AUTO: 0.5 K/UL
MONOCYTES NFR BLD: 6 %
NEUTROPHILS # BLD AUTO: 5.4 K/UL
NEUTROPHILS NFR BLD: 68.5 %
PLATELET # BLD AUTO: 375 K/UL
PMV BLD AUTO: 7.6 FL
POCT GLUCOSE: 166 MG/DL (ref 70–110)
POCT GLUCOSE: 184 MG/DL (ref 70–110)
POCT GLUCOSE: 205 MG/DL (ref 70–110)
POCT GLUCOSE: 206 MG/DL (ref 70–110)
POTASSIUM SERPL-SCNC: 3.4 MMOL/L
RBC # BLD AUTO: 3.59 M/UL
SODIUM SERPL-SCNC: 136 MMOL/L
WBC # BLD AUTO: 7.85 K/UL

## 2019-02-25 PROCEDURE — 25000003 PHARM REV CODE 250: Performed by: SURGERY

## 2019-02-25 PROCEDURE — 63600175 PHARM REV CODE 636 W HCPCS: Performed by: SURGERY

## 2019-02-25 PROCEDURE — 85025 COMPLETE CBC W/AUTO DIFF WBC: CPT

## 2019-02-25 PROCEDURE — 87077 CULTURE AEROBIC IDENTIFY: CPT

## 2019-02-25 PROCEDURE — 94761 N-INVAS EAR/PLS OXIMETRY MLT: CPT

## 2019-02-25 PROCEDURE — 87075 CULTR BACTERIA EXCEPT BLOOD: CPT

## 2019-02-25 PROCEDURE — 87070 CULTURE OTHR SPECIMN AEROBIC: CPT

## 2019-02-25 PROCEDURE — S5010 5% DEXTROSE AND 0.45% SALINE: HCPCS | Performed by: SURGERY

## 2019-02-25 PROCEDURE — 11000001 HC ACUTE MED/SURG PRIVATE ROOM

## 2019-02-25 PROCEDURE — 87186 SC STD MICRODIL/AGAR DIL: CPT

## 2019-02-25 PROCEDURE — 80048 BASIC METABOLIC PNL TOTAL CA: CPT

## 2019-02-25 PROCEDURE — 36415 COLL VENOUS BLD VENIPUNCTURE: CPT

## 2019-02-25 PROCEDURE — 87076 CULTURE ANAEROBE IDENT EACH: CPT

## 2019-02-25 RX ADMIN — PIPERACILLIN AND TAZOBACTAM 4.5 G: 4; .5 INJECTION, POWDER, LYOPHILIZED, FOR SOLUTION INTRAVENOUS; PARENTERAL at 08:02

## 2019-02-25 RX ADMIN — HYDROCODONE BITARTRATE AND ACETAMINOPHEN 1 TABLET: 5; 325 TABLET ORAL at 05:02

## 2019-02-25 RX ADMIN — TRAMADOL HYDROCHLORIDE 50 MG: 50 TABLET, FILM COATED ORAL at 11:02

## 2019-02-25 RX ADMIN — DICYCLOMINE HYDROCHLORIDE 20 MG: 10 CAPSULE ORAL at 08:02

## 2019-02-25 RX ADMIN — HYDROCODONE BITARTRATE AND ACETAMINOPHEN 1 TABLET: 5; 325 TABLET ORAL at 08:02

## 2019-02-25 RX ADMIN — DEXTROSE AND SODIUM CHLORIDE: 5; .45 INJECTION, SOLUTION INTRAVENOUS at 11:02

## 2019-02-25 RX ADMIN — INSULIN ASPART 4 UNITS: 100 INJECTION, SOLUTION INTRAVENOUS; SUBCUTANEOUS at 05:02

## 2019-02-25 RX ADMIN — HYDROCODONE BITARTRATE AND ACETAMINOPHEN 1 TABLET: 5; 325 TABLET ORAL at 12:02

## 2019-02-25 RX ADMIN — PIPERACILLIN AND TAZOBACTAM 4.5 G: 4; .5 INJECTION, POWDER, LYOPHILIZED, FOR SOLUTION INTRAVENOUS; PARENTERAL at 05:02

## 2019-02-25 RX ADMIN — CARVEDILOL 25 MG: 25 TABLET, FILM COATED ORAL at 09:02

## 2019-02-25 RX ADMIN — INSULIN ASPART 2 UNITS: 100 INJECTION, SOLUTION INTRAVENOUS; SUBCUTANEOUS at 11:02

## 2019-02-25 RX ADMIN — DICYCLOMINE HYDROCHLORIDE 20 MG: 10 CAPSULE ORAL at 03:02

## 2019-02-25 RX ADMIN — DICYCLOMINE HYDROCHLORIDE 20 MG: 10 CAPSULE ORAL at 09:02

## 2019-02-25 RX ADMIN — INSULIN DETEMIR 8 UNITS: 100 INJECTION, SOLUTION SUBCUTANEOUS at 09:02

## 2019-02-25 RX ADMIN — INSULIN ASPART 2 UNITS: 100 INJECTION, SOLUTION INTRAVENOUS; SUBCUTANEOUS at 12:02

## 2019-02-25 RX ADMIN — DEXTROSE AND SODIUM CHLORIDE: 5; .45 INJECTION, SOLUTION INTRAVENOUS at 05:02

## 2019-02-25 RX ADMIN — HYDROCHLOROTHIAZIDE 25 MG: 25 TABLET ORAL at 09:02

## 2019-02-25 RX ADMIN — PIPERACILLIN AND TAZOBACTAM 4.5 G: 4; .5 INJECTION, POWDER, LYOPHILIZED, FOR SOLUTION INTRAVENOUS; PARENTERAL at 12:02

## 2019-02-25 NOTE — PLAN OF CARE
Problem: Adult Inpatient Plan of Care  Goal: Plan of Care Review  Outcome: Ongoing (interventions implemented as appropriate)  Patient awake and alert. Complaints of abdominal pain with relief from PRN norco. Midline abd incision with moist dressing, dressing changed today. Clean, dry, and intact. Fluids running per orders. IV ABx running per ordered. Diabetic diet, tolerated well. Contact isolation status initiated for ESBL in abd wound. Ambulatory independently. Blood glucose monitored with sliding scale given as needed. Safety maintained. Bed locked and in lowest position. Call light and personal items within reach.

## 2019-02-25 NOTE — PLAN OF CARE
Problem: Adult Inpatient Plan of Care  Goal: Plan of Care Review  Outcome: Ongoing (interventions implemented as appropriate)  Pt AAOx4, no family members at bedside throughout shift. Pt complains of mild intermittent abd pain but denies n/v/d and SOB. Diabetic diet tolerated well, IVF infusing per MAR to PIV. Dressing to abd CDI. Contact isolation precautions continued. Blood glucose checks continued. Safety maintained, will cont to monitor.

## 2019-02-25 NOTE — NURSING
VN rounding: VN cued into patient's room to complete evening rounding. Patient sittiung up in bed resting without difficulties. At this time patient denies any questions, concerns and needs. Will cont to be available to patient and intervene prn.

## 2019-02-25 NOTE — PLAN OF CARE
VN cued into pt's room for introduction. VN informed pt that VN would be working along side bedside nurse and PCT throughout shift. Level of present pain assessed. At present no distress noted. Thoroughly discussed today's plan of care with patient. Discussed with patient High fall risk protocol and interventions that have been initiated and cont be in place for safety. Patient verbalized clear understanding and cooperation using teach back method. Bed alarm presently activated and in use. Will cont to be available to patient and intervene prn.

## 2019-02-25 NOTE — PROGRESS NOTES
POD#2  S/p exploratory lap, mild abdominal apian, no nausea or vomiting tolerating diet well  Mild drainage from the wound , on iv antibiotics  Continue present treatment.

## 2019-02-26 LAB
ANION GAP SERPL CALC-SCNC: 8 MMOL/L
BASOPHILS # BLD AUTO: 0.02 K/UL
BASOPHILS NFR BLD: 0.2 %
BUN SERPL-MCNC: 12 MG/DL
CALCIUM SERPL-MCNC: 9.1 MG/DL
CHLORIDE SERPL-SCNC: 99 MMOL/L
CO2 SERPL-SCNC: 29 MMOL/L
CREAT SERPL-MCNC: 1.2 MG/DL
DIFFERENTIAL METHOD: ABNORMAL
EOSINOPHIL # BLD AUTO: 0.4 K/UL
EOSINOPHIL NFR BLD: 5 %
ERYTHROCYTE [DISTWIDTH] IN BLOOD BY AUTOMATED COUNT: 14.3 %
EST. GFR  (AFRICAN AMERICAN): 54 ML/MIN/1.73 M^2
EST. GFR  (NON AFRICAN AMERICAN): 47 ML/MIN/1.73 M^2
GLUCOSE SERPL-MCNC: 227 MG/DL
HCT VFR BLD AUTO: 28.4 %
HGB BLD-MCNC: 8.8 G/DL
LYMPHOCYTES # BLD AUTO: 2 K/UL
LYMPHOCYTES NFR BLD: 24.5 %
MCH RBC QN AUTO: 23.7 PG
MCHC RBC AUTO-ENTMCNC: 31 G/DL
MCV RBC AUTO: 77 FL
MONOCYTES # BLD AUTO: 0.5 K/UL
MONOCYTES NFR BLD: 5.5 %
NEUTROPHILS # BLD AUTO: 5.3 K/UL
NEUTROPHILS NFR BLD: 64.7 %
PLATELET # BLD AUTO: 368 K/UL
PMV BLD AUTO: 7.8 FL
POCT GLUCOSE: 173 MG/DL (ref 70–110)
POCT GLUCOSE: 185 MG/DL (ref 70–110)
POCT GLUCOSE: 214 MG/DL (ref 70–110)
POCT GLUCOSE: 236 MG/DL (ref 70–110)
POTASSIUM SERPL-SCNC: 3.4 MMOL/L
RBC # BLD AUTO: 3.71 M/UL
SODIUM SERPL-SCNC: 136 MMOL/L
WBC # BLD AUTO: 8.21 K/UL

## 2019-02-26 PROCEDURE — 63600175 PHARM REV CODE 636 W HCPCS: Performed by: SURGERY

## 2019-02-26 PROCEDURE — 80048 BASIC METABOLIC PNL TOTAL CA: CPT

## 2019-02-26 PROCEDURE — 11000001 HC ACUTE MED/SURG PRIVATE ROOM

## 2019-02-26 PROCEDURE — 25000003 PHARM REV CODE 250: Performed by: SURGERY

## 2019-02-26 PROCEDURE — 94761 N-INVAS EAR/PLS OXIMETRY MLT: CPT

## 2019-02-26 PROCEDURE — 36415 COLL VENOUS BLD VENIPUNCTURE: CPT

## 2019-02-26 PROCEDURE — 85025 COMPLETE CBC W/AUTO DIFF WBC: CPT

## 2019-02-26 RX ADMIN — PIPERACILLIN AND TAZOBACTAM 4.5 G: 4; .5 INJECTION, POWDER, LYOPHILIZED, FOR SOLUTION INTRAVENOUS; PARENTERAL at 12:02

## 2019-02-26 RX ADMIN — HYDROCODONE BITARTRATE AND ACETAMINOPHEN 1 TABLET: 5; 325 TABLET ORAL at 04:02

## 2019-02-26 RX ADMIN — CARVEDILOL 25 MG: 25 TABLET, FILM COATED ORAL at 08:02

## 2019-02-26 RX ADMIN — DICYCLOMINE HYDROCHLORIDE 20 MG: 10 CAPSULE ORAL at 08:02

## 2019-02-26 RX ADMIN — HYDROCHLOROTHIAZIDE 25 MG: 25 TABLET ORAL at 08:02

## 2019-02-26 RX ADMIN — PIPERACILLIN AND TAZOBACTAM 4.5 G: 4; .5 INJECTION, POWDER, LYOPHILIZED, FOR SOLUTION INTRAVENOUS; PARENTERAL at 09:02

## 2019-02-26 RX ADMIN — PIPERACILLIN AND TAZOBACTAM 4.5 G: 4; .5 INJECTION, POWDER, LYOPHILIZED, FOR SOLUTION INTRAVENOUS; PARENTERAL at 04:02

## 2019-02-26 RX ADMIN — INSULIN ASPART 2 UNITS: 100 INJECTION, SOLUTION INTRAVENOUS; SUBCUTANEOUS at 07:02

## 2019-02-26 RX ADMIN — HYDROCODONE BITARTRATE AND ACETAMINOPHEN 1 TABLET: 5; 325 TABLET ORAL at 08:02

## 2019-02-26 RX ADMIN — DICYCLOMINE HYDROCHLORIDE 20 MG: 10 CAPSULE ORAL at 09:02

## 2019-02-26 RX ADMIN — HYDROCODONE BITARTRATE AND ACETAMINOPHEN 1 TABLET: 5; 325 TABLET ORAL at 03:02

## 2019-02-26 RX ADMIN — INSULIN ASPART 4 UNITS: 100 INJECTION, SOLUTION INTRAVENOUS; SUBCUTANEOUS at 12:02

## 2019-02-26 RX ADMIN — DICYCLOMINE HYDROCHLORIDE 20 MG: 10 CAPSULE ORAL at 03:02

## 2019-02-26 RX ADMIN — INSULIN DETEMIR 8 UNITS: 100 INJECTION, SOLUTION SUBCUTANEOUS at 08:02

## 2019-02-26 RX ADMIN — HYDROCODONE BITARTRATE AND ACETAMINOPHEN 1 TABLET: 5; 325 TABLET ORAL at 07:02

## 2019-02-26 NOTE — PHYSICIAN QUERY
PT Name: Azucena Morrison  MR #: [id number]     Physician Query Form - Bowel Obstruction Clarification     CDS/: Yolanda Leyva               Contact information: [email or web address]  This form is a permanent document in the medical record.     Query Date: February 26, 2019    By submitting this query, we are merely seeking further clarification of documentation to reflect the severity of illness of your patient. Please utilize your independent clinical judgment when addressing the question(s) below.    The Medical record reflects the following:     Indicators   Supporting Clinical Findings Location in Medical Record   x Bowel obstruction, intestinal obstruction, LBO or SBO documented Dense small bowel partial obstruction     General Surgery Op Note    Radiology findings      Treatment/Medication     x Procedure/Surgery Exploratory lap, lysis of adhesion.  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  Further procedure was abandoned  Dense adhesions of the small bowel and the   omentum to the anterior abdominal wall, preventing any further dissection   General Surgery Op Note    Other       Provider, please further specify the bowel obstruction diagnosis:  [ x  ] Partial or incomplete intestinal obstruction, due to adhesions   [   ] Partial or incomplete intestinal obstruction, due to other (please specify): ____________   [   ] Partial or incomplete intestinal obstruction, unknown or unspecified etiology   [   ] Other intestinal condition (please specify): _____________________   [   ]  Clinically Undetermined     Please document in your progress notes daily for the duration of treatment until resolved, and include in your discharge summary.

## 2019-02-26 NOTE — PROGRESS NOTES
"S/p surgery follow up  /65   Pulse 69   Temp 98.1 °F (36.7 °C)   Resp 18   Ht 5' 2" (1.575 m)   Wt 77.3 kg (170 lb 6.7 oz)   SpO2 95%   Breastfeeding? No   BMI 31.17 kg/m²   I/O last 3 completed shifts:  In: 4912.5 [P.O.:600; I.V.:4112.5; IV Piggyback:200]  Out: 1804 [Urine:1804]  I/O this shift:  In: 480 [P.O.:480]  Out: 700 [Urine:700]  Recent Results (from the past 336 hour(s))   CBC auto differential    Collection Time: 02/25/19  3:51 AM   Result Value Ref Range    WBC 7.85 3.90 - 12.70 K/uL    Hemoglobin 8.6 (L) 12.0 - 16.0 g/dL    Hematocrit 27.6 (L) 37.0 - 48.5 %    Platelets 375 (H) 150 - 350 K/uL   CBC auto differential    Collection Time: 02/22/19  3:05 PM   Result Value Ref Range    WBC 10.24 3.90 - 12.70 K/uL    Hemoglobin 8.8 (L) 12.0 - 16.0 g/dL    Hematocrit 28.6 (L) 37.0 - 48.5 %    Platelets 477 (H) 150 - 350 K/uL   CBC auto differential    Collection Time: 02/22/19  7:25 AM   Result Value Ref Range    WBC 8.50 3.90 - 12.70 K/uL    Hemoglobin 9.8 (L) 12.0 - 16.0 g/dL    Hematocrit 31.3 (L) 37.0 - 48.5 %    Platelets 402 (H) 150 - 350 K/uL   abdomen soft mild drainage , continue present treatment.    "

## 2019-02-26 NOTE — PLAN OF CARE
Problem: Adult Inpatient Plan of Care  Goal: Plan of Care Review  Outcome: Ongoing (interventions implemented as appropriate)  Pt is AAOX4. C/o pain. All medication tolerated well. Iv is CDI and infusing. Wound's dressing was changed. Glucose monitoring. VSS. No signs of distress. Bed is in lowest position with call light in reach. Will continue to monitor .

## 2019-02-27 LAB
ANION GAP SERPL CALC-SCNC: 7 MMOL/L
BASOPHILS # BLD AUTO: 0.01 K/UL
BASOPHILS NFR BLD: 0.1 %
BUN SERPL-MCNC: 12 MG/DL
CALCIUM SERPL-MCNC: 9.6 MG/DL
CHLORIDE SERPL-SCNC: 97 MMOL/L
CO2 SERPL-SCNC: 31 MMOL/L
CREAT SERPL-MCNC: 1.1 MG/DL
DIFFERENTIAL METHOD: ABNORMAL
EOSINOPHIL # BLD AUTO: 0.4 K/UL
EOSINOPHIL NFR BLD: 4.3 %
ERYTHROCYTE [DISTWIDTH] IN BLOOD BY AUTOMATED COUNT: 14.3 %
EST. GFR  (AFRICAN AMERICAN): >60 ML/MIN/1.73 M^2
EST. GFR  (NON AFRICAN AMERICAN): 52 ML/MIN/1.73 M^2
GLUCOSE SERPL-MCNC: 172 MG/DL
HCT VFR BLD AUTO: 30.1 %
HGB BLD-MCNC: 9.4 G/DL
LYMPHOCYTES # BLD AUTO: 1.5 K/UL
LYMPHOCYTES NFR BLD: 14.9 %
MCH RBC QN AUTO: 24 PG
MCHC RBC AUTO-ENTMCNC: 31.2 G/DL
MCV RBC AUTO: 77 FL
MONOCYTES # BLD AUTO: 0.5 K/UL
MONOCYTES NFR BLD: 5.5 %
NEUTROPHILS # BLD AUTO: 7.4 K/UL
NEUTROPHILS NFR BLD: 75.2 %
PLATELET # BLD AUTO: 382 K/UL
PMV BLD AUTO: 7.4 FL
POCT GLUCOSE: 149 MG/DL (ref 70–110)
POCT GLUCOSE: 180 MG/DL (ref 70–110)
POCT GLUCOSE: 185 MG/DL (ref 70–110)
POCT GLUCOSE: 258 MG/DL (ref 70–110)
POTASSIUM SERPL-SCNC: 4 MMOL/L
RBC # BLD AUTO: 3.92 M/UL
SODIUM SERPL-SCNC: 135 MMOL/L
WBC # BLD AUTO: 9.82 K/UL

## 2019-02-27 PROCEDURE — 80048 BASIC METABOLIC PNL TOTAL CA: CPT

## 2019-02-27 PROCEDURE — S5571 INSULIN DISPOS PEN 3 ML: HCPCS | Performed by: SURGERY

## 2019-02-27 PROCEDURE — 11000001 HC ACUTE MED/SURG PRIVATE ROOM

## 2019-02-27 PROCEDURE — 25000003 PHARM REV CODE 250: Performed by: SURGERY

## 2019-02-27 PROCEDURE — 85025 COMPLETE CBC W/AUTO DIFF WBC: CPT

## 2019-02-27 PROCEDURE — 94761 N-INVAS EAR/PLS OXIMETRY MLT: CPT

## 2019-02-27 PROCEDURE — 36415 COLL VENOUS BLD VENIPUNCTURE: CPT

## 2019-02-27 PROCEDURE — 63600175 PHARM REV CODE 636 W HCPCS: Performed by: SURGERY

## 2019-02-27 PROCEDURE — S5010 5% DEXTROSE AND 0.45% SALINE: HCPCS | Performed by: SURGERY

## 2019-02-27 RX ADMIN — TRAMADOL HYDROCHLORIDE 50 MG: 50 TABLET, FILM COATED ORAL at 12:02

## 2019-02-27 RX ADMIN — DICYCLOMINE HYDROCHLORIDE 20 MG: 10 CAPSULE ORAL at 01:02

## 2019-02-27 RX ADMIN — ERTAPENEM SODIUM 1 G: 1 INJECTION, POWDER, LYOPHILIZED, FOR SOLUTION INTRAMUSCULAR; INTRAVENOUS at 12:02

## 2019-02-27 RX ADMIN — HYDROCHLOROTHIAZIDE 25 MG: 25 TABLET ORAL at 09:02

## 2019-02-27 RX ADMIN — DICYCLOMINE HYDROCHLORIDE 20 MG: 10 CAPSULE ORAL at 09:02

## 2019-02-27 RX ADMIN — DICYCLOMINE HYDROCHLORIDE 20 MG: 10 CAPSULE ORAL at 08:02

## 2019-02-27 RX ADMIN — PIPERACILLIN AND TAZOBACTAM 4.5 G: 4; .5 INJECTION, POWDER, LYOPHILIZED, FOR SOLUTION INTRAVENOUS; PARENTERAL at 05:02

## 2019-02-27 RX ADMIN — ONDANSETRON 8 MG: 8 TABLET, ORALLY DISINTEGRATING ORAL at 02:02

## 2019-02-27 RX ADMIN — INSULIN DETEMIR 8 UNITS: 100 INJECTION, SOLUTION SUBCUTANEOUS at 09:02

## 2019-02-27 RX ADMIN — HYDROCODONE BITARTRATE AND ACETAMINOPHEN 1 TABLET: 5; 325 TABLET ORAL at 09:02

## 2019-02-27 RX ADMIN — DEXTROSE AND SODIUM CHLORIDE: 5; .45 INJECTION, SOLUTION INTRAVENOUS at 09:02

## 2019-02-27 RX ADMIN — INSULIN ASPART 6 UNITS: 100 INJECTION, SOLUTION INTRAVENOUS; SUBCUTANEOUS at 12:02

## 2019-02-27 RX ADMIN — CARVEDILOL 25 MG: 25 TABLET, FILM COATED ORAL at 09:02

## 2019-02-27 RX ADMIN — HYDROCODONE BITARTRATE AND ACETAMINOPHEN 1 TABLET: 5; 325 TABLET ORAL at 06:02

## 2019-02-27 RX ADMIN — HYDROCODONE BITARTRATE AND ACETAMINOPHEN 1 TABLET: 5; 325 TABLET ORAL at 05:02

## 2019-02-27 NOTE — PROCEDURES
"Azucena Morrison is a 68 y.o. female patient.    Temp: 99.2 °F (37.3 °C) (02/27/19 1155)  Pulse: 85 (02/27/19 1155)  Resp: 18 (02/27/19 1155)  BP: (!) 141/81 (02/27/19 1155)  SpO2: 97 % (02/27/19 1125)  Weight: 80.1 kg (176 lb 9.4 oz) (02/27/19 0611)  Height: 5' 2" (157.5 cm) (02/22/19 0750)    PICC  Date/Time: 2/27/2019 4:42 PM  Performed by: Sean Arredondo RN  Post-operative diagnosis: picc  Consent Done: Yes  Time out: Immediately prior to procedure a time out was called to verify the correct patient, procedure, equipment, support staff and site/side marked as required  Indications: med administration and vascular access  Anesthesia: local infiltration  Local anesthetic: lidocaine 1% without epinephrine  Anesthetic Total (mL): 3  Preparation: skin prepped with ChloraPrep and skin prepped with Betadine  Skin prep agent dried: skin prep agent completely dried prior to procedure  Sterile barriers: all five maximum sterile barriers used - cap, mask, sterile gown, sterile gloves, and large sterile sheet  Hand hygiene: hand hygiene performed prior to central venous catheter insertion  Location details: left basilic  Catheter type: double lumen  Catheter size: 5 Fr  Catheter Length: 41cm    Ultrasound guidance: yes  Vessel Caliber: medium and patent, compressibility normal  Vascular Doppler: not done  Needle advanced into vessel with real time Ultrasound guidance.  Guidewire confirmed in vessel.  Sterile sheath used.  no esophageal manometryNumber of attempts: 1  Post-procedure: blood return through all ports and sterile dressing applied (pt with allergy to chlorhexidine)  Estimated blood loss (mL): 0  Specimens: No  Implants: No  Assessment: placement verified by x-ray  Tip Termination Explanation: see rad. report  Complications: none  Comments: Do not use until placement verified by MD Sean Arredondo  2/27/2019  "

## 2019-02-27 NOTE — PLAN OF CARE
TN spoke with MD -- per Dr. Horton - pt will need 6 wks of IV abx (from today)    ertapenem (INVANZ) 1 g in sodium chloride 0.9 % 100 mL IVPB (ready to mix system) 1 g, IV, Q24H   stop date 4/10/19     per pt - no preference for infusion company - ok to use [employer]/CPP    TN spoke with Evangelist-- he will start working on this referral -- there may be a charge involved.  MD to put in  orders ASAP.     This pt is current with [facility]  per initial assessment   referral sent per [facility] to [facility] and [facility]    pt does not have a PICC yet.   Evangelist given name/# of son Andry  and JOHNY Zeng  [phone]  to set up teaching time.       02/27/19 1427   Discharge Reassessment   Assessment Type Discharge Planning Reassessment   Provided patient/caregiver education on the expected discharge date and the discharge plan Yes   Do you have any problems affording any of your prescribed medications? TBD   Discharge Plan A Home with family;Home Health;Home

## 2019-02-27 NOTE — PROGRESS NOTES
"Surgery follow up  /78   Pulse 71   Temp 98.3 °F (36.8 °C)   Resp 18   Ht 5' 2" (1.575 m)   Wt 79.2 kg (174 lb 9.7 oz)   SpO2 97%   Breastfeeding? No   BMI 31.94 kg/m²   I/O last 3 completed shifts:  In: 4752.9 [P.O.:1680; I.V.:2772.9; IV Piggyback:300]  Out: 2850 [Urine:2850]  No intake/output data recorded.  Moving bowels   Advance diet   D/c home in am  "

## 2019-02-27 NOTE — PROGRESS NOTES
"Surgery follow up  BP (!) 141/81 (BP Location: Left arm, Patient Position: Lying)   Pulse 85   Temp 99.2 °F (37.3 °C) (Oral)   Resp 18   Ht 5' 2" (1.575 m)   Wt 80.1 kg (176 lb 9.4 oz)   SpO2 97%   Breastfeeding? No   BMI 32.30 kg/m²   I/O last 3 completed shifts:  In: 4722.9 [P.O.:1450; I.V.:2772.9; IV Piggyback:500]  Out: 3150 [Urine:3150]  I/O this shift:  In: 420 [P.O.:420]  Out: 200 [Urine:200]  Recent Results (from the past 336 hour(s))   CBC auto differential    Collection Time: 02/27/19  9:33 AM   Result Value Ref Range    WBC 9.82 3.90 - 12.70 K/uL    Hemoglobin 9.4 (L) 12.0 - 16.0 g/dL    Hematocrit 30.1 (L) 37.0 - 48.5 %    Platelets 382 (H) 150 - 350 K/uL   CBC auto differential    Collection Time: 02/26/19  4:21 AM   Result Value Ref Range    WBC 8.21 3.90 - 12.70 K/uL    Hemoglobin 8.8 (L) 12.0 - 16.0 g/dL    Hematocrit 28.4 (L) 37.0 - 48.5 %    Platelets 368 (H) 150 - 350 K/uL   CBC auto differential    Collection Time: 02/25/19  3:51 AM   Result Value Ref Range    WBC 7.85 3.90 - 12.70 K/uL    Hemoglobin 8.6 (L) 12.0 - 16.0 g/dL    Hematocrit 27.6 (L) 37.0 - 48.5 %    Platelets 375 (H) 150 - 350 K/uL     Recent Results (from the past 336 hour(s))   Basic metabolic panel    Collection Time: 02/27/19  9:33 AM   Result Value Ref Range    Sodium 135 (L) 136 - 145 mmol/L    Potassium 4.0 3.5 - 5.1 mmol/L    Chloride 97 95 - 110 mmol/L    CO2 31 (H) 23 - 29 mmol/L    BUN, Bld 12 8 - 23 mg/dL    Creatinine 1.1 0.5 - 1.4 mg/dL    Calcium 9.6 8.7 - 10.5 mg/dL    Anion Gap 7 (L) 8 - 16 mmol/L   Basic metabolic panel    Collection Time: 02/26/19  4:21 AM   Result Value Ref Range    Sodium 136 136 - 145 mmol/L    Potassium 3.4 (L) 3.5 - 5.1 mmol/L    Chloride 99 95 - 110 mmol/L    CO2 29 23 - 29 mmol/L    BUN, Bld 12 8 - 23 mg/dL    Creatinine 1.2 0.5 - 1.4 mg/dL    Calcium 9.1 8.7 - 10.5 mg/dL    Anion Gap 8 8 - 16 mmol/L   Basic metabolic panel    Collection Time: 02/25/19  3:51 AM   Result Value Ref " Range    Sodium 136 136 - 145 mmol/L    Potassium 3.4 (L) 3.5 - 5.1 mmol/L    Chloride 99 95 - 110 mmol/L    CO2 27 23 - 29 mmol/L    BUN, Bld 8 8 - 23 mg/dL    Creatinine 1.1 0.5 - 1.4 mg/dL    Calcium 8.9 8.7 - 10.5 mg/dL    Anion Gap 10 8 - 16 mmol/L   dressing changed drainage less  Will place picc line and arrange for home iv antibiotic therapy.

## 2019-02-27 NOTE — PROGRESS NOTES
TN spoke with MD Eunice cisse to d/c this pt Wed 2/27 if PICC is inserted and family has undergoing education and medication  will be available for tomorrow's 12N dose at home.     Evangelist with [facility] to meet with family today at 5:30 pm

## 2019-02-27 NOTE — PLAN OF CARE
Patient sleeping and requested not to be disturbed when VN qued into room.  All safety measures maintained including bed locked, in lowest position with alarm on.  Call light within reach.

## 2019-02-27 NOTE — PROGRESS NOTES
This  nurse accompanied Dr. Horton and visited Mrs. Morrison to room 519. Dr. Horton explained plan of care, treatment and further care to Mrs. Morrison. This includes plan for PICC line insertion, antibiotic therapy, wound care treatment   and diet. All questions from Mrs. Morrison related to surgery, medication, and treatment were answered by Dr. Horton and this nurse translated them in Greek. No further questions from Mrs. Morrison at this time.

## 2019-02-27 NOTE — PLAN OF CARE
Problem: Adult Inpatient Plan of Care  Goal: Plan of Care Review  Outcome: Ongoing (interventions implemented as appropriate)  Patient resting in bed, AAOx4. Medications administered as ordered. Patient complained of some pain at the beginning of the shift, PRN medication administered. Abdominal dressing remains CDI. Encouraged to call with needs or concerns. Will continue to monitor.

## 2019-02-27 NOTE — PLAN OF CARE
Problem: Adult Inpatient Plan of Care  Goal: Plan of Care Review  Outcome: Ongoing (interventions implemented as appropriate)  Patient awake and alert. Complaints of abdominal pain with relief from PRN norco. Midline abd incision with moist dressing, dressing changed today. Clean, dry, and intact. Fluids running per orders. IV ABx running per ordered. Diabetic diet, tolerated well. Contact isolation status initiated for ESBL in abd wound. Ambulatory independently. Blood glucose monitored with sliding scale given as needed. Safety maintained. Bed locked and in lowest position. Call light and personal items within reach

## 2019-02-27 NOTE — NURSING
Requested into room to perform Time out for Picc Line insertion. Picc Rn at bedside. Sterile technique maintained. See Flowsheets. Pt tolerated well, denies pain or sob. Xray to verify picc placement released per Picc line RN request. Informed Xray. Bedside RN informed.

## 2019-02-27 NOTE — PROGRESS NOTES
"F/u for wound care   Minimal pain  /69 (Patient Position: Lying)   Pulse 71   Temp 98.3 °F (36.8 °C) (Oral)   Resp 20   Ht 5' 2" (1.575 m)   Wt 77.3 kg (170 lb 6.7 oz)   SpO2 97%   Breastfeeding? No   BMI 31.17 kg/m²   I/O last 3 completed shifts:  In: 6707.1 [P.O.:1880; I.V.:4527.1; IV Piggyback:300]  Out: 1600 [Urine:1600]  No intake/output data recorded.  Doing better arrangement for home health iv antibiotics and wound care. In progress  "

## 2019-02-28 VITALS
HEART RATE: 67 BPM | SYSTOLIC BLOOD PRESSURE: 125 MMHG | OXYGEN SATURATION: 97 % | DIASTOLIC BLOOD PRESSURE: 75 MMHG | RESPIRATION RATE: 20 BRPM | BODY MASS INDEX: 31.36 KG/M2 | TEMPERATURE: 98 F | WEIGHT: 170.44 LBS | HEIGHT: 62 IN

## 2019-02-28 LAB
ANION GAP SERPL CALC-SCNC: 6 MMOL/L
BACTERIA SPEC AEROBE CULT: NORMAL
BASOPHILS # BLD AUTO: 0.01 K/UL
BASOPHILS NFR BLD: 0.1 %
BUN SERPL-MCNC: 9 MG/DL
CALCIUM SERPL-MCNC: 9.2 MG/DL
CHLORIDE SERPL-SCNC: 97 MMOL/L
CO2 SERPL-SCNC: 31 MMOL/L
CREAT SERPL-MCNC: 1.1 MG/DL
DIFFERENTIAL METHOD: ABNORMAL
EOSINOPHIL # BLD AUTO: 0.4 K/UL
EOSINOPHIL NFR BLD: 5.8 %
ERYTHROCYTE [DISTWIDTH] IN BLOOD BY AUTOMATED COUNT: 14.2 %
EST. GFR  (AFRICAN AMERICAN): >60 ML/MIN/1.73 M^2
EST. GFR  (NON AFRICAN AMERICAN): 52 ML/MIN/1.73 M^2
GLUCOSE SERPL-MCNC: 166 MG/DL
HCT VFR BLD AUTO: 28.1 %
HGB BLD-MCNC: 8.9 G/DL
LYMPHOCYTES # BLD AUTO: 1.4 K/UL
LYMPHOCYTES NFR BLD: 19.1 %
MCH RBC QN AUTO: 24.3 PG
MCHC RBC AUTO-ENTMCNC: 31.7 G/DL
MCV RBC AUTO: 77 FL
MONOCYTES # BLD AUTO: 0.6 K/UL
MONOCYTES NFR BLD: 7.8 %
NEUTROPHILS # BLD AUTO: 4.7 K/UL
NEUTROPHILS NFR BLD: 67.1 %
PLATELET # BLD AUTO: 345 K/UL
PMV BLD AUTO: 7.3 FL
POCT GLUCOSE: 187 MG/DL (ref 70–110)
POCT GLUCOSE: 194 MG/DL (ref 70–110)
POTASSIUM SERPL-SCNC: 3.2 MMOL/L
RBC # BLD AUTO: 3.67 M/UL
SODIUM SERPL-SCNC: 134 MMOL/L
WBC # BLD AUTO: 7.07 K/UL

## 2019-02-28 PROCEDURE — 94761 N-INVAS EAR/PLS OXIMETRY MLT: CPT

## 2019-02-28 PROCEDURE — S5010 5% DEXTROSE AND 0.45% SALINE: HCPCS | Performed by: SURGERY

## 2019-02-28 PROCEDURE — 25000003 PHARM REV CODE 250: Performed by: SURGERY

## 2019-02-28 PROCEDURE — 80048 BASIC METABOLIC PNL TOTAL CA: CPT

## 2019-02-28 PROCEDURE — 85025 COMPLETE CBC W/AUTO DIFF WBC: CPT

## 2019-02-28 PROCEDURE — 63600175 PHARM REV CODE 636 W HCPCS: Performed by: SURGERY

## 2019-02-28 RX ADMIN — DICYCLOMINE HYDROCHLORIDE 20 MG: 10 CAPSULE ORAL at 09:02

## 2019-02-28 RX ADMIN — HYDROCODONE BITARTRATE AND ACETAMINOPHEN 1 TABLET: 5; 325 TABLET ORAL at 05:02

## 2019-02-28 RX ADMIN — HYDROCODONE BITARTRATE AND ACETAMINOPHEN 1 TABLET: 5; 325 TABLET ORAL at 09:02

## 2019-02-28 RX ADMIN — INSULIN DETEMIR 8 UNITS: 100 INJECTION, SOLUTION SUBCUTANEOUS at 09:02

## 2019-02-28 RX ADMIN — CARVEDILOL 25 MG: 25 TABLET, FILM COATED ORAL at 09:02

## 2019-02-28 RX ADMIN — ERTAPENEM SODIUM 1 G: 1 INJECTION, POWDER, LYOPHILIZED, FOR SOLUTION INTRAMUSCULAR; INTRAVENOUS at 11:02

## 2019-02-28 RX ADMIN — DICYCLOMINE HYDROCHLORIDE 20 MG: 10 CAPSULE ORAL at 03:02

## 2019-02-28 RX ADMIN — HYDROCHLOROTHIAZIDE 25 MG: 25 TABLET ORAL at 09:02

## 2019-02-28 RX ADMIN — HYDROCODONE BITARTRATE AND ACETAMINOPHEN 1 TABLET: 5; 325 TABLET ORAL at 03:02

## 2019-02-28 RX ADMIN — DEXTROSE AND SODIUM CHLORIDE: 5; .45 INJECTION, SOLUTION INTRAVENOUS at 05:02

## 2019-02-28 NOTE — PROGRESS NOTES
"Tn attempted to call DR. Horton regarding "general heart failure home health orders" ; TN left voicemail that orders incorrect and left TN call back #  "

## 2019-02-28 NOTE — PLAN OF CARE
Problem: Adult Inpatient Plan of Care  Goal: Plan of Care Review  Outcome: Ongoing (interventions implemented as appropriate)  Patient resting in bed, AAOx4. IVF infusing as ordered. Medications administered as ordered. No complaints of pain or discomfort. Asleep for majority of shift. Dressing remains CDI. Encouraged to call with needs or concerns. Will continue to monitor.

## 2019-02-28 NOTE — DISCHARGE INSTRUCTIONS
Abdominal Pain, Adult (Tanzanian) View Edit Remove  Diabetes, Diet (Tanzanian) View Edit Remove  Symptoms With Uncertain Cause (Tanzanian) View Edit Remove  Caring for Your Peripherally Inserted Central Catheter (PICC), Discharge Instructions for (Tanzanian) View Edit Remove  Ertapenem injection (Tanzanian) View Edit Remove

## 2019-02-28 NOTE — PROGRESS NOTES
TN spoke to Evangelist at [facility] regarding referral for IV antibiotics sent yesterday. Evangelist informed TN he had performed teaching yesterday evening with pt's son Andry  and DIL Azucena  [phone]  and medication and supplies have been sent to house.    Tn noted pt also noted pt current with [facility]  but hh orders not completed yesterday. SHAZIA spoke to DR. Lester to request hh be corrected, SN, PICC care, meds, including antibiotic with stop date.    Tn spoke to Christa, floor nurse and informed of plan to d/c home today and was informed antibiotic due for 12:00. Tn informed nurse will plan for d/c after antibiotic completed.

## 2019-02-28 NOTE — PLAN OF CARE
Cued into patient's room.  Permission received per patient to turn camera to view patient.  Introduced as VN for night shift that will be working with floor nurse and nursing assistant.  Educated patient on VN's role in patient care. Plan of care reviewed with patient. Education per flowsheet.  Opportunity given for questions and questions answered.  No complaints.  Instructed to call for assistance.  Will cont to monitor.

## 2019-02-28 NOTE — PROGRESS NOTES
[facility]  Home Health Hub: [phone]    GENERAL HEART FAILURE  HOME HEALTH ORDERS    MD following patients home care: ____________    Admit to Home Health    Diagnosis:   Patient Active Problem List   Diagnosis    History of hemicolectomy    Status post Aiyana's procedure    HTN (hypertension)    Type 2 diabetes mellitus without complication    Suture granuloma    Essential hypertension    Drainage from wound    S/P colostomy    Fistula of intestine, excluding rectum and anus    S/P exploratory laparotomy    Tachycardia    Abscess of abdominal wall    Type 2 diabetes mellitus with skin complication    Diabetes mellitus    Unspecified local infection of skin and subcutaneous tissue    Diabetes with skin complication    Generalized abdominal pain    LLQ pain    Colonic fistula    Polyp of colon    Abdominal wall abscess    Infection due to ESBL-producing Escherichia coli    Nausea    Colocutaneous fistula    Small bowel obstruction       Patient is homebound due to:  Colonic fistula    Allergies:   Review of patient's allergies indicates:   Allergen Reactions    Chlorhexidine gluconate Rash     Chlorhexidine/alcohol wipes. Rash and blistering.         Diet:  Diabetic 2000 cals    Activities as tolerated: SN to instruct patient on importance of home exercise program. Utilize tool for education.    Nursing:    Evaluate fall risk and need for Physical Therapy by performing time up and go (TUG) and 30 second chair rise.  o If the patient scores at risk on any one of the two tests performed then SN to order PT evaluate and treat.   o If PT consulted based on at risk score, then PT to evaluate need for OT in the home on evaluation visit.     SN to complete comprehensive assessment within 24 hours of discharge from hospital or referral from ambulatory clinic:    Perform and record the following vital signs:   BP   Respiratory Rate   Pulse   O2 Sat  o If room air O2  sat below 88%, notify physician so they can order an O2 qualifying test.    Weight (deliver scale to patient on admit if patient does not have properly functioning scale)   Skilled Nurse to record Target Weight = AM weight immediately after discharge unless otherwise specified   Standing Daily Weight:  o Instruct patient to take daily AM weights (record on log provided)   Protocol for how to weigh patient:   o Nurse to hold all equipment  o Shoes off  o Early morning with enforcement to patient on timing and method      DIURETIC SLIDING SCALE    If diuretic sliding scale activity, skilled nurse visits daily to instruct and monitor medication adherence until target weight. Then resume prior order frequency.    If weight gain exceeds 5 lbs over target weight, call MD    If weight gain of 3-4 lbs over target weight, then:    After 3 days of increased oral diuretic dose, get BMP. If patient is on increased oral diuretic dose greater than 5 days, repeat BMP at day 7 of increased dose.     If target weight not reached after 5 days of increased oral diuretic, proceed to IV diuretic with daily  patient contact to include face-to-face visit and telephone encounters.    IV diuretic:    Obtain BMP within 24 hours of IV dose administration.    If weight decreased by > 2 lbs. but not yet at target, continue increased oral diuretic dose until target   weight is reached.    Repeat BMP every 3 days while on increased diuretic dose.     When target weight is reached have patient resume usual dose of diuretic. Notify provider of outcome.    Repeat BMP one week after achieving target weight.          Cr<1.5, double oral potassium dose while taking increased diuretic dose. Return to baseline Potassium dose when returning to baseline oral diuretic dose.     With repeat BMP, follow the below table with additional supplement based on the labs:     Scr > 1.5 mg/dl Scr ? 1.5 mg/dl   K ? 3.0 - notify provider 40 mEq bid 40 mEq tid   K-  3.1-3.3 20 mEq bid 20 mEq tid   K 3.4-3.7 10 mEq bid 10 mEq tid     Skilled nurse to perform medication reconciliation on admit visit comparing discharged medications with actual medications in the home to include OTCs.   SN to provide a weekly medication reminder (give filled pill box) if warranted and instruct patient on its use.  If a medication discrepancy exists please contact>>>>>>>>>>>>>    Skilled nurse to assess all home equipment such as oxygen, nebulizers, hospital beds etc to determine if the equipment is functioning properly and patient/caregiver understanding how to use properly.     If equipment is not functioning properly please contact supplier/ to resolve.    On admission visit, skilled nurse to instruct patient on signs and symptoms of HF by educating patient and/or caregiver on Heart Failure Zone Stoplight tool.  SN to instruct patient caregiver on who to contact if symptoms develop.  SN to leave behind tool with contact numbers as developed by [facility].    Skilled nurse to verify patient follow-up appointments have been scheduled and patient/caregiver understands the importance of making all appointments. Determine if any transportation related issues exist. SW consult if needed to assist with coordination of transportation to and from MD offices.    SN to reinforce education as instructed on admit visit and continue education on the below items on subsequent visits:   Instruct on the definition of CHF.  o (See definition in [facility] educational packet)   Instruct on the signs/symptoms of CHF to be reported.   Instruct on factors that cause exacerbation.   Instruct on action, dose, schedule, and side effects of medications.   Risk factors for HF   Smoking cessation if patient is a smoker   Triggers of SOB   Coping mechanisms   Importance of diet/fluid restrictions, understanding food labels   Fall prevention and home safety   Plan on self management after HH  discharge    CONSULTS:    Home health wound care every other day clean with wound cleanser and redress wound with xeroform , 4 x 4 and abd pads     MISCELLANEOUS CARE: (Epic to remove .[facility])  Continue iv antibiotics as ordered    WOUND CARE ORDERS  Wound care every other day, clean with wound cleanser and redress with iodorm , xerofm , 4 x 4 and abd pads    Medications: Review discharge medications with patient and family and provide education.      Current Discharge Medication List      START taking these medications    Details   loperamide (IMODIUM) 2 mg capsule Take 1 capsule (2 mg total) by mouth 4 (four) times daily as needed for Diarrhea.  Refills: 0       metoclopramide HCl (REGLAN) 5 MG tablet Take 1 tablet (5 mg total) by mouth every 6 (six) hours as needed.       ondansetron (ZOFRAN-ODT) 8 MG TbDL Take 1 tablet (8 mg total) by mouth every 8 (eight) hours as needed.           CONTINUE these medications which have CHANGED    Details   bumetanide (BUMEX) 2 MG tablet Take 2 tablets (4 mg total) by mouth 2 (two) times daily. If wt gain 2-3 lbs x 2-5 days, take 3x/day, if no improvement call MD  Qty: 120 tablet, Refills: 0           CONTINUE these medications which have NOT CHANGED    Details   acetaminophen (TYLENOL) 325 MG tablet Take 2 tablets (650 mg total) by mouth every 6 (six) hours as needed.  Refills: 0       aspirin (ECOTRIN) 81 MG EC tablet Take 81 mg by mouth once daily.       clopidogrel (PLAVIX) 75 mg tablet Take 75 mg by mouth once daily.       nortriptyline (PAMELOR) 10 MG capsule Take 10 mg by mouth every evening.       senna-docusate 8.6-50 mg (PERICOLACE) 8.6-50 mg per tablet Take 1 tablet by mouth daily as needed for Constipation.           STOP taking these medications       ascorbic acid, vitamin C, (VITAMIN C) 500 MG tablet Comments:   Reason for Stopping:          atorvastatin (LIPITOR) 40 MG tablet Comments:   Reason for Stopping:          cyanocobalamin (VITAMIN B-12)  1000 MCG tablet Comments:   Reason for Stopping:          ergocalciferol (VITAMIN D2) 50,000 unit Cap Comments:   Reason for Stopping:          metoprolol succinate (TOPROL-XL) 25 MG 24 hr tablet Comments:   Reason for Stopping:          multivitamin capsule Comments:   Reason for Stopping:                I have seen and examined this patient face to face today. My clinical findings that support the need for the home health skilled services and home bound status are the following:      I certify that this patient is confined to her home and needs intermittent skilled nursing care, physical therapy, speech therapy and occupational therapy.    Ulisses Horton MD  02/28/2019

## 2019-02-28 NOTE — PLAN OF CARE
VN cued into pts room with pts permission. Pt accepted VN to review DC instructions in Telugu. AVS printed and handed to pt by bedside nurse. Reviewed follow-up appointments, new/current/stopped medications, diet, and importance of medication compliance. Reviewed home care instructions and PICC line care with pt. Reviewed infection s/s, treatment plan, self-management, and when to seek medical attention. Allowed time for questions. All questions answered. Patient verbalized complete understanding of discharge instructions and voices no concerns.     Discharge instructions complete. Bedside nurse aware.

## 2019-02-28 NOTE — PLAN OF CARE
Tn spoke to Ana Luisa at [facility] regarding pt to be d/c today and Evangelist from [facility] has completed education and meds/supplies delivered to home. TN visited with pt who informed Tn her son will be to hospital around 5:00 to pick her up.Pt informed Tn all education understood and has no questions.  Tn gave pt d/c folder,   Business card, and yellow Case Management D/C Information sheet.    Tn informed floor Nurse of pt ready from CM standpoint.    Future Appointments   Date Time Provider Department Center   3/6/2019  8:30 AM Ulisses Horton MD [facility] WOUND [facility]   PCP Dr. Nj 3/7/19  9:00     02/28/19 1233   Final Note   Assessment Type Final Discharge Note   Anticipated Discharge Disposition Home-Health   What phone number can be called within the next 1-3 days to see how you are doing after discharge? [phone]   Hospital Follow Up  Appt(s) scheduled? Yes   Discharge plans and expectations educations in teach back method with documentation complete? Yes   Right Care Referral Info   Post Acute Recommendation Home-care   Referral Type hh and infusion   Facility Name [facility] and [facility]

## 2019-02-28 NOTE — NURSING
VN rounding: VN cued into patient's room to complete evening rounding. Patient sittiung up in bed eating evening meal without difficulties. At this time patient denies any questions, concerns and needs. Will cont to be available to patient and intervene prn.

## 2019-02-28 NOTE — PROGRESS NOTES
[facility]    HOME HEALTH ORDERS  FACE TO FACE ENCOUNTER    Patient Name: Azucena Morrison  YOB: 1950    PCP: Pj Sanches MD   PCP Address: [address] [place] [address]  PCP Phone Number: [phone]  PCP Fax: [phone]    Encounter Date: 02/28/2019    Admit to Home Health    Diagnoses:  Active Hospital Problems    Diagnosis  POA    *Colonic fistula [K63.2]  Yes    Diabetes with skin complication [E11.628]  Yes    Diabetes mellitus [E11.9]  Yes    S/P exploratory laparotomy [Z98.890]  Not Applicable    Drainage from wound [T14.8XXA]  Yes    History of hemicolectomy [Z90.49]  Not Applicable    HTN (hypertension) [I10]  Yes    Type 2 diabetes mellitus without complication [E11.9]  Yes      Resolved Hospital Problems   No resolved problems to display.       Future Appointments   Date Time Provider Department Center   3/6/2019  8:30 AM Ulisses Horton MD [facility] WOUND [facility]     Follow-up Information     Pj Sanches MD On 3/7/2019.    Specialty:  Internal Medicine  Why:  9:00 am     Contact information:  [address] [place] [id number]  [phone]             [facility] On 3/6/2019.    Specialty:  Wound Care  Why:  8:30 am   -- Dr. Horton     Contact information:  [address] [facility] [address]  [facility] [address] [phone]  Additional information:  1st Floor MOB           Ulisses Horton MD In 1 week.    Specialties:  General Surgery, Surgery  Contact information:  [address]  [address]  [place] [address]  [phone]                     I have seen and examined this patient face to face today. My clinical findings that support the need for the home health skilled services and home bound status are the following:    Allergies:  Review of patient's allergies indicates:   Allergen Reactions    Chlorhexidine gluconate Rash     Chlorhexidine/alcohol wipes. Rash and blistering.       Diet: diabetic 2000  cals    Activities: as tolertaed    Nursing:   SN to complete comprehensive assessment including routine vital signs. Instruct on disease process and s/s of complications to report to MD. Review/verify medication list sent home with the patient at time of discharge  and instruct patient/caregiver as needed. Frequency may be adjusted depending on start of care date.    Notify MD if SBP > 160 or < 90; DBP > 90 or < 50; HR > 120 or < 50; Temp > 101; Other:         CONSULTS:      MISCELLANEOUS CARE:  Wound Care Orders:  yes:  Surgical Wound:  Location: wound care clean with wound cleanser , and redress with iodofrom , xeroform ,4 x 4 and ABD padevery other day           WOUND CARE ORDERSWound Care Orders:  yes:  Surgical Wound:  Location: wound care clean with wound cleanser , and redress with iodofrom , xeroform ,4 x 4 and ABD padevery other day            Medications: Review discharge medications with patient and family and provide education.      Current Discharge Medication List      START taking these medications    Details   ertapenem sodium (ERTAPENEM, INVANZ, 1 G/100 ML NS, READY TO MIX,) Inject 100 mLs (1 g total) into the vein once daily.  Qty: 100 mL, Refills: 45         CONTINUE these medications which have NOT CHANGED    Details   acetaminophen (TYLENOL) 325 MG tablet Take 2 tablets (650 mg total) by mouth every 8 (eight) hours as needed.  Refills: 0      carvedilol (COREG) 25 MG tablet Take 1 tablet (25 mg total) by mouth once daily.  Qty: 30 tablet, Refills: 1      dicyclomine (BENTYL) 10 MG capsule TAKE 1 CAPSULE BY MOUTH 4 TIMES DAILY BEFORE MEAL(S) AND NIGHTLY  Qty: 120 capsule, Refills: 0    Comments: Please consider 90 day supplies to promote better adherence      hydroCHLOROthiazide (HYDRODIURIL) 25 MG tablet Take 1 tablet (25 mg total) by mouth daily  Qty: 30 tablet, Refills: 1      HYDROcodone-acetaminophen (NORCO) 5-325 mg per tablet Take 1 tablet by mouth every 4 (four) hours as needed.  Qty: 24  "tablet, Refills: 0      insulin glargine 100 units/mL (3mL) SubQ pen Inject subcutaneously into the skin 10 units every morning.  Qty: 15 mL, Refills: 3      metFORMIN (GLUCOPHAGE) 1000 MG tablet Take 1 tablet (1,000 mg total) by mouth 2 (two) times daily.  Qty: 180 tablet, Refills: 3      traMADol (ULTRAM) 50 mg tablet TAKE 1 TABLET BY MOUTH EVERY 6 HOURS  Qty: 24 tablet, Refills: 0      TRUETEST TEST STRIPS Strp       !! ondansetron (ZOFRAN) 4 MG tablet Take 2 tablets (8 mg total) by mouth every 8 (eight) hours as needed for Nausea.  Qty: 14 tablet, Refills: 0      !! ondansetron (ZOFRAN) 8 MG tablet Take 1 tablet (8 mg total) by mouth every 8 (eight) hours as needed for Nausea.  Qty: 40 tablet, Refills: 1      promethazine-codeine 6.25-10 mg/5 ml (PHENERGAN WITH CODEINE) 6.25-10 mg/5 mL syrup Take 1 teaspoonful ( 5ml )  by mouth 3 times a day  Qty: 150 mL, Refills: 0       !! - Potential duplicate medications found. Please discuss with provider.      STOP taking these medications       bisacodyl (DULCOLAX) 5 mg EC tablet Comments:   Reason for Stopping:         erythromycin base (E-MYCIN) 500 MG tablet Comments:   Reason for Stopping:         gentamicin (GARAMYCIN) 0.1 % cream Comments:   Reason for Stopping:         mupirocin (BACTROBAN) 2 % ointment Comments:   Reason for Stopping:         neomycin (MYCIFRADIN) 500 mg Tab Comments:   Reason for Stopping:         pen needle, diabetic (BD ULTRA-FINE ONEIL PEN NEEDLE) 32 gauge x 5/32" Ndle Comments:   Reason for Stopping:         pen needle, diabetic 32 gauge x 5/32" Ndle Comments:   Reason for Stopping:         polyethylene glycol (GOLYTELY,NULYTELY) 236-22.74-6.74 -5.86 gram suspension Comments:   Reason for Stopping:               I certify that this patient is confined to her home and needs intermittent skilled nursing care.        "

## 2019-03-01 NOTE — DISCHARGE SUMMARY
ADDENDUM:    The patient was admitted for possible release of bowel obstruction and removing   the colorectal fistula and relieving the bowel obstruction.    The patient's postoperative diagnosis was dense adhesions to the pelvic area,   unable to resect the small bowel secondary to dense inflammation and abscess   formation.    Procedure performed was exploratory lap, lysis of adhesion, attempted resection   bowel and resection of the colorectal fistula was unsuccessful.  The patient did   well, was continued on n.p.o., NG tube, IV fluids and antibiotics.  The patient   started eating well, moving bowels.  The patient was discharged on diabetic   diet.  Her condition improved.  The patient continued to have drainage from the   wound.  The patient discharged home on IV antibiotics, wound care, continued   home health care.  The patient is supposed to follow by myself in the [facility] after a week.  The patient understood all the instructions.  The patient   has had the PICC line already placed and was continued on IV antibiotics, given   pain medicine by mouth and will continue the wound care as an outpatient.      MS/HN  dd: 03/14/2019 17:03:51 (CDT)  td: 03/14/2019 22:56:15 (CDT)  Doc ID   #[id number]  Job ID #[id number]    CC:     [id number]

## 2019-03-04 ENCOUNTER — LAB VISIT (OUTPATIENT)
Dept: LAB | Facility: HOSPITAL | Age: 69
End: 2019-03-04
Attending: SURGERY
Payer: MEDICARE

## 2019-03-04 DIAGNOSIS — L02.211 ABSCESS OF ABDOMINAL WALL: Primary | ICD-10-CM

## 2019-03-04 LAB
ALBUMIN SERPL BCP-MCNC: 2.7 G/DL
ALP SERPL-CCNC: 93 U/L
ALT SERPL W/O P-5'-P-CCNC: 9 U/L
ANION GAP SERPL CALC-SCNC: 10 MMOL/L
AST SERPL-CCNC: 12 U/L
BACTERIA SPEC ANAEROBE CULT: NORMAL
BASOPHILS # BLD AUTO: 0.05 K/UL
BASOPHILS NFR BLD: 0.5 %
BILIRUB SERPL-MCNC: 0.1 MG/DL
BUN SERPL-MCNC: 17 MG/DL
CALCIUM SERPL-MCNC: 9.7 MG/DL
CHLORIDE SERPL-SCNC: 98 MMOL/L
CO2 SERPL-SCNC: 30 MMOL/L
CREAT SERPL-MCNC: 1.2 MG/DL
CRP SERPL-MCNC: 83.8 MG/L
DIFFERENTIAL METHOD: ABNORMAL
EOSINOPHIL # BLD AUTO: 0.4 K/UL
EOSINOPHIL NFR BLD: 4.2 %
ERYTHROCYTE [DISTWIDTH] IN BLOOD BY AUTOMATED COUNT: 13.9 %
ERYTHROCYTE [SEDIMENTATION RATE] IN BLOOD BY WESTERGREN METHOD: 102 MM/HR
EST. GFR  (AFRICAN AMERICAN): 54 ML/MIN/1.73 M^2
EST. GFR  (NON AFRICAN AMERICAN): 46.9 ML/MIN/1.73 M^2
GLUCOSE SERPL-MCNC: 143 MG/DL
HCT VFR BLD AUTO: 30.2 %
HGB BLD-MCNC: 9.6 G/DL
IMM GRANULOCYTES # BLD AUTO: 0.02 K/UL
IMM GRANULOCYTES NFR BLD AUTO: 0.2 %
LYMPHOCYTES # BLD AUTO: 2.1 K/UL
LYMPHOCYTES NFR BLD: 22.5 %
MCH RBC QN AUTO: 24.7 PG
MCHC RBC AUTO-ENTMCNC: 31.8 G/DL
MCV RBC AUTO: 78 FL
MONOCYTES # BLD AUTO: 0.5 K/UL
MONOCYTES NFR BLD: 5.7 %
NEUTROPHILS # BLD AUTO: 6.3 K/UL
NEUTROPHILS NFR BLD: 66.9 %
NRBC BLD-RTO: 0 /100 WBC
PLATELET # BLD AUTO: 469 K/UL
PMV BLD AUTO: 8.5 FL
POTASSIUM SERPL-SCNC: 3.5 MMOL/L
PROT SERPL-MCNC: 8.4 G/DL
RBC # BLD AUTO: 3.89 M/UL
SODIUM SERPL-SCNC: 138 MMOL/L
WBC # BLD AUTO: 9.34 K/UL

## 2019-03-04 PROCEDURE — 80053 COMPREHEN METABOLIC PANEL: CPT

## 2019-03-04 PROCEDURE — 85025 COMPLETE CBC W/AUTO DIFF WBC: CPT

## 2019-03-04 PROCEDURE — 85652 RBC SED RATE AUTOMATED: CPT

## 2019-03-04 PROCEDURE — 86140 C-REACTIVE PROTEIN: CPT

## 2019-03-06 ENCOUNTER — HOSPITAL ENCOUNTER (OUTPATIENT)
Dept: WOUND CARE | Facility: HOSPITAL | Age: 69
Discharge: HOME | End: 2019-03-06
Attending: SURGERY
Payer: MEDICARE

## 2019-03-06 VITALS
TEMPERATURE: 98 F | HEIGHT: 62 IN | BODY MASS INDEX: 31.28 KG/M2 | DIASTOLIC BLOOD PRESSURE: 74 MMHG | HEART RATE: 81 BPM | WEIGHT: 170 LBS | SYSTOLIC BLOOD PRESSURE: 147 MMHG

## 2019-03-06 DIAGNOSIS — K56.609 SMALL BOWEL OBSTRUCTION: ICD-10-CM

## 2019-03-06 DIAGNOSIS — L98.499 TYPE 2 DIABETES MELLITUS WITH OTHER SKIN ULCER, UNSPECIFIED WHETHER LONG TERM INSULIN USE: Primary | ICD-10-CM

## 2019-03-06 DIAGNOSIS — L02.211 ABDOMINAL WALL ABSCESS: ICD-10-CM

## 2019-03-06 DIAGNOSIS — R10.32 LLQ PAIN: ICD-10-CM

## 2019-03-06 DIAGNOSIS — K63.2 COLOCUTANEOUS FISTULA: ICD-10-CM

## 2019-03-06 DIAGNOSIS — R11.0 NAUSEA: ICD-10-CM

## 2019-03-06 DIAGNOSIS — E11.622 TYPE 2 DIABETES MELLITUS WITH OTHER SKIN ULCER, UNSPECIFIED WHETHER LONG TERM INSULIN USE: Primary | ICD-10-CM

## 2019-03-06 PROCEDURE — 99213 OFFICE O/P EST LOW 20 MIN: CPT

## 2019-03-06 NOTE — PROGRESS NOTES
"Subjective:       Patient ID: Azucena Morrison is a 68 y.o. female.    Chief Complaint: Non-healing Wound (abdomen)    6/29/17: Pt re-admit for distal abdominal wound. Pt denies any fevers or chills but states she feels nauseous at times. Returned to [place] June 28, from [place], reports much trouble with abdominal wound while in [place].  7/6/17-- Patient scheduled for surgical drainage of wound Tuesday 7/11/17DB  7/19/17-- Patient post op clinic visit.  8/16/17: CT of abdomen and pelvis done 8/9/17 due to suspected fistula  8/23/17-- U/S of abdomen done today.  12/13/17 Wound vac with 20cc drainage in clinic today.  1/10/18: on PO abx  1/24/18: Fistulagram ordered per Dr. Horton. Patient still on PO abx  02/21/18 Patient is not on antibiotics at this time. BS fasting 135 per patient report this am.  03/07/18 Culture of Anterior Abdominal Wound is positive. No antibiotics at this time.  fasting per patient report.    03/21/18 Patient c/o nausea along with abdominal tenderness near abd midline wound. Patient states that pain level is 6 and that she passed two sero-sanguineous clots from wound. Patient is afebrile this am.  3/28/18: patient continue antibiotics and reports that pain is less than last weeks clinic visit. Drainage has decreased as well  04/04/18 Patient states that abdominal pain is "pressure" sensation and that when she pushes down on her abdomen on either side of abdominal wound she feels like she has to urinate. Patient reports pain level of 3 this am.  fasting this am per patient report.   5/2/18: Patient had Abdomina Toribio today before wound care. She had discontinued Bactrim PO per Dr. Horton's recommendation and culture results and is now taking Amoxicillin PO  6/20/18: Pt reports itching to wound and periwound   6/27/18: patient reports no new complaints with regards to her wound or abdomen, wound continues to decrease in size and drainage  8/1/18: Pt reports increased pain to " abdomen with nausea and emesis since thursday 7/26/18, denies any fevers, patient did no go to ER as instructed by home health nurse on Sat. 7/28/18.  8/29/18: Patient admitted to hospital 8/2 for abdominal wound complications. Surgery for abdominal abscess per Dr. Horton on 8/3. Patient placed on IV antibiotics and discharged home on 8/18 with [facility]  and PICC line to LUE. Patient currently on IV ertapenem. IV medication sent to patients home per [facility].   9/5/18: IV Ertapenem to continue 1 week. Culture sent to lab. [facility]  sent orders. PICC line intact to E.  9/12/18 Antibiotics completed. Culture results pending. 1 deep stitch remaining.  9/12/18: Culture positive for E. Coli, patient to continue Ertapenem IV antibiotics x 2weeks. [facility] notified  9/26/18: F/U abdominal wound, wound continues to improve. Patient will complete IV antibiotics today  10/3/18: patient c/o diarrhea for 2 days and nausea with some vomiting. Labs and stool culture ordered. IV antibiotics discontinued today  10/10/2018 patient will start on IV antibiotic Invaz IV once a day, pending PICC line placement, order placed today  for PICC per Dr. Horton.  10/24/18: patient on IV antibiotics, tolerating well. Continue for 1 week  11/7/2018: IV Antibiotic discontinued.  11/8/2018: PICC line to left upper arm discontinued by Home Health.  11/21/2018 F/u for abdominal wall fistula , c/o nausea  No vomiting , no fever  12/5/2018: F/u for abdominal wall fisula, c/o gas, Dr. Horton will order Bentyl. Surgery schedule for January 2019.  12/12/2018: F/u for abdominal wall fistula, c/o abdominal pain. Dr. Horton ordered Norco 5/325mg tab 1 PO every 4 hours as needed for pain and referred patient for x-ray of abdomen after clinic today.  12/19/2018: F/u for abdominal wall fisula, c/o nausea, Dr. Horton re ordered Ondansetron (Zofran) 8mg one tab by mouth every eight hours as needed for nausea. No changes in wound  condition from last visit noted in clinic today.  12/26/2018: F/u abdominal wall fistula drainage, no c/o at this time. Denies any abdominal pain or nausea. Dr. Horton recommends surgery first week of January 2019 for abdominal fistula treatment and possible colostomy placement, patient agree.  01/02/2019: F/u abdominal wall fistula. Dressing with large amount of drainage, malodorous, Dr. Horton is scheduling surgery for third week of January, 2019.  1/16/19: F/U Dr. Horton today in clinic, surgery scheduled for next Friday 1/25/19 with Dr. Horton.     01/23/2019: Presents to wound care clinic for f/u abdominal wound care tx. Surgery scheduled with Dr. Horton for Friday 01/25/2019. Dr. Horton explained Mrs. Morrison surgery procedure including possible complications, Nurse  ([place]an) present, patient verbalizes understanding of procedure including possible complications, all questions from patient were answered by Dr. Horton including blood sugar and blood pressure medications per patient's concerns. Consent for surgery and blood transfusion signed by patient, Dr. Horton and nurse witnessed.  Abdominal wound cultures collected per Dr. Horton, cultures sent to lab/micro pending results. Dr. Horton prescribed the following orders: fleet enema for Thursday 1/24/2019, clear liquid diet and not to eat after midnight all for 1/24/2019. Start taking Neomycin and erythromycin by mouth three times a day (1/24/2019) and dulcolax one tab by mouth twice a day, Mrs. Morrison voices understanding.     01/30/2019: F/U wound care treatment with Dr. Horton. Per pt, primary physician Dr. Pj Monae ordered for her to take potassium pills for three days, last day today and scheduled for lab work at primary physician clinic on 1/31/2019. Per pt. Feeling better, no more abdominal pain (went to ER 1/24/2019 and discharged 1/25/2019 c/o abd pain.). Dr. Horton awaiting on primary clearance to re-schedule surgery, per  "pt. She will call wound care clinic and Dr. Horton to make aware of clearance day. Continue with same orders for dressing change for Dr. Horton. Mrs. Morrison requesting gentamicin refill.     02/06/2019: F/u with Dr. Horton for wound care treatment. Mrs. Morrison brought to clinic a clearance for surgery by Dr. Pj Sanches dated 02/06/2019 "Hyperkalemia-Resolved K 4.8 2/1/2019, labs , Cr. 1.05, H/H 10/30. No contraindication to surgery, tight control of BS, Hydration." A copy of EKG (1/24/2019) and lab work from [facility] collected 1/31/2019, reported results 2/1/2019. Dr. Horton scheduled surgery for 02/22/2019, consent were signed on 01/23/2019, all questions were answered by Dr. Horton, this nurse  ([place]) present. Education provided to patient on the importance of having a clear liquid diet the day before surgery 02/21/2019 as per Dr. Horton, new medications and preop preparation before surgery, verbalizes understanding. Dr. Horton ordered Norco 5/325 mg PO for pain, Dulcolax two tabs by mouth to take on 02/21/2019, Soap suds enema (SSE) on Thursday 02/21/2019, Golytely by mouth 2 liters on 2/21/2019, Erythromycin 500 mg one tab by mouth three times a day and Neomycin 1 gm by mouth three times a day.     2/13/2019: Presents to wound care clinic for a f/u with Dr. Horton for wound care treatment. No new orders in wound dressing change, reviewed preop orders with Mrs. Morrison per Dr. Horton, all questions answered. Surgery scheduled for 02/22/2019.  2/20/19: Continues wound care a previously ordered.  Depth measured per Dr. Horton 8cm.  Preop orders reviewed with patient who verbalized understanding.  Surgery scheduled for Fri 2/22/19.  Rx given to patient for Norco and Zofran.    03/06/19: Patient admitted to [facility] on 02/22/19 for exploratory laparotomy of abdomen per Dr. Horton. Patient discharged on 02/28/19 with home health and PICC line with IV antibiotics. " "Staples removed today in clinic. Patient denies any fever, chills, n&v; however, she states that she has "low energy." Continued with wound care as ordered.      Review of Systems   Constitutional: Positive for fatigue. Negative for chills and fever.   HENT: Negative.    Eyes: Negative.    Respiratory: Negative.    Cardiovascular: Negative.    Gastrointestinal: Negative.    Genitourinary: Negative.    Musculoskeletal: Negative.    Skin: Positive for rash and wound.   Neurological: Negative.    Psychiatric/Behavioral: Negative.        Objective:      Physical Exam   Constitutional: She is oriented to person, place, and time. She appears well-developed and well-nourished.   HENT:   Head: Normocephalic.   Eyes: Conjunctivae and EOM are normal. Pupils are equal, round, and reactive to light.   Neck: Normal range of motion. Neck supple.   Cardiovascular: Normal rate, regular rhythm, normal heart sounds and intact distal pulses.   Pulmonary/Chest: Effort normal and breath sounds normal.   Abdominal: Soft. Bowel sounds are normal.       Musculoskeletal: Normal range of motion.   Neurological: She is alert and oriented to person, place, and time. She has normal reflexes.   Skin: Skin is warm and dry.       Assessment:       1. 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, unspecified whether long term insulin use    2. Small bowel obstruction    3. Colocutaneous fistula           Incision/Site 02/22/19 1146 Abdomen midline midline (Active)   02/22/19 1146    Present Prior to Hospital Arrival?:    Side:    Location: Abdomen   Orientation: midline   Incision Type: midline   Closure Method: Staples   Additional Comments:    Removal Indication and Assessment:    Wound Outcome:    Removal Indications:    Dressing Appearance Intact;Moist drainage 3/6/2019  8:00 AM   Drainage Amount Moderate 3/6/2019  8:00 AM   Drainage Characteristics/Odor Serosanguineous 3/6/2019  8:00 AM   Appearance Pink;Red;Moist 3/6/2019  8:00 AM   Red " (%), Wound Tissue Color 100 % 3/6/2019  8:00 AM   Periwound Area Intact;Satellite lesion 3/6/2019  8:00 AM   Wound Edges Defined 3/6/2019  8:00 AM   Wound Length (cm) 4.9 cm 3/6/2019  8:00 AM   Wound Width (cm) 3.3 cm 3/6/2019  8:00 AM   Wound Depth (cm) 1.4 cm 3/6/2019  8:00 AM   Wound Volume (cm^3) 22.64 cm^3 3/6/2019  8:00 AM   Wound Surface Area (cm^2) 16.17 cm^2 3/6/2019  8:00 AM   Tunneling (depth (cm)/location) 0.8cm at 1 o'clock 3/6/2019  8:00 AM   Care Cleansed with:;Sterile normal saline 3/6/2019  8:00 AM   Dressing Applied;Other (see comments);Calcium alginate;Gauze;Abd pad;Island/border 3/6/2019  8:00 AM   Periwound Care Absorptive dressing applied;Skin barrier film applied;Topical treatment applied 3/6/2019  8:00 AM   Dressing Change Due 03/08/19 3/6/2019  8:00 AM       Wound:  Abdomen - midline    Cleanse wound with:  Normal Saline.  Periwound:  - Cavion or benzoin to periwound; betamethasone periwoind prn itching.  Antimicrobial Ointment/Cream:  Gentamycin to wound bed.  Secondary dressing: Apply iodoform gauze over wound, cover with aquacel extra, 4x4 gauze, small abd pad folded in half, secure with mefix tape.   Do not probe depth of wound  Other notes: Apply gentamycin to proximal satellite wound, cover with aquacel border  Frequency:  Mon, Wed, Fri    Home Health: [facility]: Home health nurse to perform wound assessment and dressing change on Mon, Wed, Fri except when in clinic    Rx given to patient for Norco 5/325mg and gentamycin ointment        Plan:       allstaples out rtc one week         Follow-up in about 1 week (around 3/13/2019) for Dr. Horton.

## 2019-03-13 ENCOUNTER — HOSPITAL ENCOUNTER (OUTPATIENT)
Dept: WOUND CARE | Facility: HOSPITAL | Age: 69
Discharge: HOME | End: 2019-03-13
Attending: SURGERY
Payer: MEDICARE

## 2019-03-13 VITALS
HEIGHT: 62 IN | HEART RATE: 80 BPM | BODY MASS INDEX: 31.47 KG/M2 | WEIGHT: 171 LBS | TEMPERATURE: 98 F | RESPIRATION RATE: 18 BRPM | DIASTOLIC BLOOD PRESSURE: 74 MMHG | SYSTOLIC BLOOD PRESSURE: 132 MMHG

## 2019-03-13 DIAGNOSIS — E11.622 TYPE 2 DIABETES MELLITUS WITH OTHER SKIN ULCER, UNSPECIFIED WHETHER LONG TERM INSULIN USE: ICD-10-CM

## 2019-03-13 DIAGNOSIS — T14.8XXA DRAINAGE FROM WOUND: Primary | ICD-10-CM

## 2019-03-13 DIAGNOSIS — K63.2 ENTERO-ENTERIC FISTULA: ICD-10-CM

## 2019-03-13 DIAGNOSIS — L02.211 ABDOMINAL WALL ABSCESS: ICD-10-CM

## 2019-03-13 DIAGNOSIS — R10.32 LLQ PAIN: ICD-10-CM

## 2019-03-13 DIAGNOSIS — I10 ESSENTIAL HYPERTENSION: ICD-10-CM

## 2019-03-13 DIAGNOSIS — K63.2 FISTULA OF INTESTINE, EXCLUDING RECTUM AND ANUS: ICD-10-CM

## 2019-03-13 DIAGNOSIS — Z98.890 S/P EXPLORATORY LAPAROTOMY: ICD-10-CM

## 2019-03-13 DIAGNOSIS — L98.499 TYPE 2 DIABETES MELLITUS WITH OTHER SKIN ULCER, UNSPECIFIED WHETHER LONG TERM INSULIN USE: ICD-10-CM

## 2019-03-13 PROCEDURE — 99213 OFFICE O/P EST LOW 20 MIN: CPT

## 2019-03-13 NOTE — PROGRESS NOTES
"[facility]                Progress Note    Subjective:       Patient ID: Azucena Morrison is a 68 y.o. female.    Chief Complaint: Non-healing Wound      6/29/17: Pt re-admit for distal abdominal wound. Pt denies any fevers or chills but states she feels nauseous at times. Returned to [place] June 28, from [place], reports much trouble with abdominal wound while in [place].  7/6/17-- Patient scheduled for surgical drainage of wound Tuesday 7/11/17DB  7/19/17-- Patient post op clinic visit.  8/16/17: CT of abdomen and pelvis done 8/9/17 due to suspected fistula  8/23/17-- U/S of abdomen done today.  12/13/17 Wound vac with 20cc drainage in clinic today.  1/10/18: on PO abx  1/24/18: Fistulagram ordered per Dr. Horton. Patient still on PO abx  02/21/18 Patient is not on antibiotics at this time. BS fasting 135 per patient report this am.  03/07/18 Culture of Anterior Abdominal Wound is positive. No antibiotics at this time.  fasting per patient report.    03/21/18 Patient c/o nausea along with abdominal tenderness near abd midline wound. Patient states that pain level is 6 and that she passed two sero-sanguineous clots from wound. Patient is afebrile this am.  3/28/18: patient continue antibiotics and reports that pain is less than last weeks clinic visit. Drainage has decreased as well  04/04/18 Patient states that abdominal pain is "pressure" sensation and that when she pushes down on her abdomen on either side of abdominal wound she feels like she has to urinate. Patient reports pain level of 3 this am.  fasting this am per patient report.   5/2/18: Patient had Abdomina Toribio today before wound care. She had discontinued Bactrim PO per Dr. Horton's recommendation and culture results and is now taking Amoxicillin PO  6/20/18: Pt reports itching to wound and periwound   6/27/18: patient reports no new complaints with regards to her wound or abdomen, " wound continues to decrease in size and drainage  8/1/18: Pt reports increased pain to abdomen with nausea and emesis since thursday 7/26/18, denies any fevers, patient did no go to ER as instructed by home health nurse on Sat. 7/28/18.  8/29/18: Patient admitted to hospital 8/2 for abdominal wound complications. Surgery for abdominal abscess per Dr. Horton on 8/3. Patient placed on IV antibiotics and discharged home on 8/18 with [facility]  and PICC line to [facility]. Patient currently on IV ertapenem. IV medication sent to patients home per [facility].   9/5/18: IV Ertapenem to continue 1 week. Culture sent to lab. [facility]  sent orders. PICC line intact to [facility].  9/12/18 Antibiotics completed. Culture results pending. 1 deep stitch remaining.  9/12/18: Culture positive for E. Coli, patient to continue Ertapenem IV antibiotics x 2weeks. [facility] notified  9/26/18: F/U abdominal wound, wound continues to improve. Patient will complete IV antibiotics today  10/3/18: patient c/o diarrhea for 2 days and nausea with some vomiting. Labs and stool culture ordered. IV antibiotics discontinued today  10/10/2018 patient will start on IV antibiotic Invaz IV once a day, pending PICC line placement, order placed today  for PICC per Dr. Horton.  10/24/18: patient on IV antibiotics, tolerating well. Continue for 1 week  11/7/2018: IV Antibiotic discontinued.  11/8/2018: PICC line to left upper arm discontinued by Home Health.  11/21/2018 F/u for abdominal wall fistula , c/o nausea  No vomiting , no fever  12/5/2018: F/u for abdominal wall fisula, c/o gas, Dr. Horton will order Bentyl. Surgery schedule for January 2019.  12/12/2018: F/u for abdominal wall fistula, c/o abdominal pain. Dr. Horton ordered Norco 5/325mg tab 1 PO every 4 hours as needed for pain and referred patient for x-ray of abdomen after clinic today.  12/19/2018: F/u for abdominal wall fisula, c/o nausea, Dr. Horton re ordered Ondansetron  (Zofran) 8mg one tab by mouth every eight hours as needed for nausea. No changes in wound condition from last visit noted in clinic today.  12/26/2018: F/u abdominal wall fistula drainage, no c/o at this time. Denies any abdominal pain or nausea. Dr. Horton recommends surgery first week of January 2019 for abdominal fistula treatment and possible colostomy placement, patient agree.  01/02/2019: F/u abdominal wall fistula. Dressing with large amount of drainage, malodorous, Dr. Horton is scheduling surgery for third week of January, 2019.  1/16/19: F/U Dr. Horton today in clinic, surgery scheduled for next Friday 1/25/19 with Dr. Horton.     01/23/2019: Presents to wound care clinic for f/u abdominal wound care tx. Surgery scheduled with Dr. Horton for Friday 01/25/2019. Dr. Horton explained Mrs. Morrison surgery procedure including possible complications, Nurse  (Georgian) present, patient verbalizes understanding of procedure including possible complications, all questions from patient were answered by Dr. Horton including blood sugar and blood pressure medications per patient's concerns. Consent for surgery and blood transfusion signed by patient, Dr. Horton and nurse witnessed.  Abdominal wound cultures collected per Dr. Horton, cultures sent to lab/micro pending results. Dr. Horton prescribed the following orders: fleet enema for Thursday 1/24/2019, clear liquid diet and not to eat after midnight all for 1/24/2019. Start taking Neomycin and erythromycin by mouth three times a day (1/24/2019) and dulcolax one tab by mouth twice a day, Mrs. Morrison voices understanding.     01/30/2019: F/U wound care treatment with Dr. Horton. Per pt, primary physician Dr. Pj Monae ordered for her to take potassium pills for three days, last day today and scheduled for lab work at primary physician clinic on 1/31/2019. Per pt. Feeling better, no more abdominal pain (went to ER 1/24/2019 and discharged  "1/25/2019 c/o abd pain.). Dr. Horton awaiting on primary clearance to re-schedule surgery, per pt. She will call wound care clinic and Dr. Horton to make aware of clearance day. Continue with same orders for dressing change for Dr. Horton. Mrs. Morrison requesting gentamicin refill.     02/06/2019: F/u with Dr. Horton for wound care treatment. Mrs. Morrison brought to clinic a clearance for surgery by Dr. Pj Sanches dated 02/06/2019 "Hyperkalemia-Resolved K 4.8 2/1/2019, labs , Cr. 1.05, H/H 10/30. No contraindication to surgery, tight control of BS, Hydration." A copy of EKG (1/24/2019) and lab work from [facility] collected 1/31/2019, reported results 2/1/2019. Dr. Horton scheduled surgery for 02/22/2019, consent were signed on 01/23/2019, all questions were answered by Dr. Horton, this nurse  (Irish) present. Education provided to patient on the importance of having a clear liquid diet the day before surgery 02/21/2019 as per Dr. Horton, new medications and preop preparation before surgery, verbalizes understanding. Dr. Horton ordered Norco 5/325 mg PO for pain, Dulcolax two tabs by mouth to take on 02/21/2019, Soap suds enema (SSE) on Thursday 02/21/2019, Golytely by mouth 2 liters on 2/21/2019, Erythromycin 500 mg one tab by mouth three times a day and Neomycin 1 gm by mouth three times a day.     2/13/2019: Presents to wound care clinic for a f/u with Dr. Horton for wound care treatment. No new orders in wound dressing change, reviewed preop orders with Mrs. Morrison per Dr. Horton, all questions answered. Surgery scheduled for 02/22/2019.  2/20/19: Continues wound care a previously ordered.  Depth measured per Dr. Horton 8cm.  Preop orders reviewed with patient who verbalized understanding.  Surgery scheduled for Fri 2/22/19.  Rx given to patient for Norco and Zofran.    03/06/19: Patient admitted to [facility] on 02/22/19 for exploratory laparotomy of abdomen per  " "[facility]. Patient discharged on 02/28/19 with home health and PICC line with IV antibiotics. Staples removed today in clinic. Patient denies any fever, chills, n&v; however, she states that she has "low energy." Continued with wound care as ordered.    3/13/2019: F/U with Dr. Horton for wound care treatment. Continue with same wound dressing change orders as per Dr. Horton, orders followed. Home Health to continue wound dressing change and lab draw for CBC weekly; continue IV Ertapenem/Invaz 1 gm IV daily as ordered.        Review of Systems   Constitutional: Negative.    HENT: Negative.    Eyes: Negative.    Respiratory: Negative.    Cardiovascular: Negative.    Gastrointestinal: Negative.    Genitourinary: Negative.    Musculoskeletal: Negative.    Skin: Negative.    Neurological: Negative.    Psychiatric/Behavioral: Negative.          Objective:        Physical Exam   Constitutional: She is oriented to person, place, and time. She appears well-developed and well-nourished.   HENT:   Head: Normocephalic.   Eyes: Conjunctivae and EOM are normal. Pupils are equal, round, and reactive to light.   Neck: Normal range of motion. Neck supple.   Cardiovascular: Normal rate, regular rhythm, normal heart sounds and intact distal pulses.   Pulmonary/Chest: Effort normal and breath sounds normal.   Abdominal: Soft. Bowel sounds are normal.   Musculoskeletal: Normal range of motion.   Neurological: She is alert and oriented to person, place, and time. She has normal reflexes.   Skin: Skin is warm and dry.       Vitals:    03/13/19 0845   BP: 132/74   Pulse: 80   Resp: 18   Temp: 98.3 °F (36.8 °C)       Assessment:           ICD-10-CM ICD-9-CM   1. 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 Abdominal wall abscess L02.211 682.2            Incision/Site 02/22/19 1146 Abdomen midline midline (Active)   02/22/19 1146    Present Prior to Hospital Arrival?:    Side:    Location: Abdomen "   Orientation: midline   Incision Type: midline   Closure Method: Staples   Additional Comments:    Removal Indication and Assessment:    Wound Outcome:    Removal Indications:    Dressing Appearance Intact;Moist drainage 3/13/2019  8:55 AM   Drainage Amount Moderate 3/13/2019  8:55 AM   Drainage Characteristics/Odor Serosanguineous 3/13/2019  8:55 AM   Appearance Pink;Red 3/13/2019  8:55 AM   Red (%), Wound Tissue Color 100 % 3/13/2019  8:55 AM   Periwound Area Intact;Satellite lesion 3/13/2019  8:55 AM   Wound Edges Defined 3/13/2019  8:55 AM   Wound Length (cm) 4.9 cm 3/13/2019  8:55 AM   Wound Width (cm) 3.2 cm 3/13/2019  8:55 AM   Wound Depth (cm) 1.4 cm 3/13/2019  8:55 AM   Wound Volume (cm^3) 21.95 cm^3 3/13/2019  8:55 AM   Wound Surface Area (cm^2) 15.68 cm^2 3/13/2019  8:55 AM   Tunneling (depth (cm)/location) 0.8 cm @ 1'oclock 3/13/2019  8:55 AM   Care Cleansed with:;Sterile normal saline 3/13/2019  8:55 AM   Dressing Applied;Calcium alginate;Abd pad;Island/border 3/13/2019  8:55 AM   Periwound Care Absorptive dressing applied 3/13/2019  8:55 AM   Dressing Change Due 03/15/19 3/13/2019  8:55 AM         Wound #1 Abdomen - midline    Cleanse wound with:  Normal Saline.  Periwound:  - Cavion or benzoin to periwound; betamethasone periwoind prn itching.  Antimicrobial Ointment/Cream:  Gentamycin to wound bed.  Secondary dressing: Apply iodoform gauze over wound, cover with aquacel extra, 4x4 gauze, small abd pad, secure with mefix tape.   Do not probe depth of wound  Frequency:  Mon, Wed, Fri  Follow-up in 1 week with Dr. Horton Wed 03/20/19    Home Health: [facility]: Home health nurse to perform wound assessment and dressing change on Mon, Wed, Fri except when in clinic. Next clinic appt on Wed 03/20/19. Home Health to draw CBC lab every week starting this week, may draw lab work this Friday 3/15/2019, same day of wound dressing change.    Other orders: apply gentamycin to proximal satellite wound,  cover with aquacel border.  Plan:          Follow-up in about 7 days (around 3/20/2019). Clinic visit with Dr. Horton  [facility] to continue wound dressing change and lab draw as ordered  Continue IV Ertapenem/Invaz 1 gm IV daily as ordered

## 2019-03-15 ENCOUNTER — LAB VISIT (OUTPATIENT)
Dept: LAB | Facility: HOSPITAL | Age: 69
End: 2019-03-15
Attending: SURGERY
Payer: MEDICARE

## 2019-03-15 DIAGNOSIS — K63.2 FISTULA OF INTESTINE, EXCLUDING RECTUM AND ANUS: Primary | ICD-10-CM

## 2019-03-15 LAB
ALBUMIN SERPL BCP-MCNC: 2.9 G/DL
ALP SERPL-CCNC: 91 U/L
ALT SERPL W/O P-5'-P-CCNC: 11 U/L
ANION GAP SERPL CALC-SCNC: 10 MMOL/L
AST SERPL-CCNC: 16 U/L
BILIRUB SERPL-MCNC: 0.1 MG/DL
BUN SERPL-MCNC: 12 MG/DL
CALCIUM SERPL-MCNC: 10 MG/DL
CHLORIDE SERPL-SCNC: 99 MMOL/L
CO2 SERPL-SCNC: 29 MMOL/L
CREAT SERPL-MCNC: 1.1 MG/DL
CRP SERPL-MCNC: 70.4 MG/L
ERYTHROCYTE [DISTWIDTH] IN BLOOD BY AUTOMATED COUNT: 14.4 %
ERYTHROCYTE [SEDIMENTATION RATE] IN BLOOD BY WESTERGREN METHOD: 75 MM/HR
EST. GFR  (AFRICAN AMERICAN): >60 ML/MIN/1.73 M^2
EST. GFR  (NON AFRICAN AMERICAN): 52.1 ML/MIN/1.73 M^2
GLUCOSE SERPL-MCNC: 139 MG/DL
HCT VFR BLD AUTO: 31.6 %
HGB BLD-MCNC: 9.6 G/DL
MCH RBC QN AUTO: 24 PG
MCHC RBC AUTO-ENTMCNC: 30.4 G/DL
MCV RBC AUTO: 79 FL
PLATELET # BLD AUTO: 478 K/UL
PMV BLD AUTO: 8.5 FL
POTASSIUM SERPL-SCNC: 4.2 MMOL/L
PROT SERPL-MCNC: 8.6 G/DL
RBC # BLD AUTO: 4 M/UL
SODIUM SERPL-SCNC: 138 MMOL/L
WBC # BLD AUTO: 10.95 K/UL

## 2019-03-15 PROCEDURE — 85027 COMPLETE CBC AUTOMATED: CPT

## 2019-03-15 PROCEDURE — 85652 RBC SED RATE AUTOMATED: CPT

## 2019-03-15 PROCEDURE — 86140 C-REACTIVE PROTEIN: CPT

## 2019-03-15 PROCEDURE — 80053 COMPREHEN METABOLIC PANEL: CPT

## 2019-03-15 NOTE — DISCHARGE SUMMARY
DATE OF ADMISSION:  02/22/2019    DATE OF DISCHARGE:  02/28/2019.    HISTORY OF PRESENT ILLNESS:  This 68-year-old female, known diabetic,   hypertensive with chronic small-bowel obstruction, colocutaneous fistula, was   being followed at the Wound Center.  The patient was admitted for possible   exploratory lap for bowel resection and resection of the colorectal fistula.    The patient was put on bowel prep, was brought in as an outpatient and then   admitted.  The patient underwent exploratory lap, lysis of adhesion and attempts   were made to take down the colocutaneous fistula with lysis of adhesion.  The   patient was found to have practically cemented small bowel and the colon in the   low pelvic area, which was very difficult to delineate all the small bowel with   potential of creating multiple fistula and at this point, the surgery was deemed   impossible.  Discussion was held with assisting surgeon, Dr. Pope, and   mutual agreement was not to proceed any further with proceeding with resection   of the bowel and ended up multiple fistulas and causing more damage to the small   bowel and the large bowel.  At this point, it was decided to close the abdomen.    The patient was closed and then sent to the Recovery Room.  The patient was   then treated with n.p.o., IV fluids and IV antibiotics.  The patient's abdominal   wound drainage was minimal and gradually started decreasing and the patient was   started on a diet and continued home medication and PICC line was placed for   continued IV antibiotics at home and the wound care has an outpatient.  The   patient was explained the findings with the help of a Welsh-speaking nurse,   Azucena from the Wound Center of the intraoperative finding and causing potential   damage further to the bowel.  The patient consented and agreed to continue with   the present treatment with IV antibiotics for at least 4 to 6 weeks and to be   content with since the patient was  still eating, moving bowels.  No more fever.    The patient was out of bed, ambulating.  Arrangements were made for home health   care and to be seen in the Wound Center for continued wound care.    POSTOPERATIVE DIAGNOSES:  Bowel obstruction, colocutaneous fistula, diabetes,   chronic small bowel obstruction, obesity, and hypertension.      MS/HN  dd: 03/01/2019 14:48:13 (CST)  td: 03/02/2019 00:51:43 (CST)  Doc ID   #[id number]  Job ID #[id number]    CC:

## 2019-03-18 ENCOUNTER — LAB VISIT (OUTPATIENT)
Dept: LAB | Facility: HOSPITAL | Age: 69
End: 2019-03-18
Attending: INTERNAL MEDICINE
Payer: MEDICARE

## 2019-03-18 DIAGNOSIS — T14.8XXA CRUSHING INJURY OF MULTIPLE SITES OF TRUNK: Primary | ICD-10-CM

## 2019-03-18 LAB
ALBUMIN SERPL BCP-MCNC: 2.9 G/DL
ALP SERPL-CCNC: 87 U/L
ALT SERPL W/O P-5'-P-CCNC: 13 U/L
ANION GAP SERPL CALC-SCNC: 13 MMOL/L
AST SERPL-CCNC: 18 U/L
BASOPHILS # BLD AUTO: 0.03 K/UL
BASOPHILS NFR BLD: 0.3 %
BILIRUB SERPL-MCNC: 0.1 MG/DL
BUN SERPL-MCNC: 13 MG/DL
CALCIUM SERPL-MCNC: 9.7 MG/DL
CHLORIDE SERPL-SCNC: 101 MMOL/L
CO2 SERPL-SCNC: 24 MMOL/L
CREAT SERPL-MCNC: 1.4 MG/DL
CRP SERPL-MCNC: 85.2 MG/L
DIFFERENTIAL METHOD: ABNORMAL
EOSINOPHIL # BLD AUTO: 0.4 K/UL
EOSINOPHIL NFR BLD: 4.3 %
ERYTHROCYTE [DISTWIDTH] IN BLOOD BY AUTOMATED COUNT: 14.6 %
ERYTHROCYTE [SEDIMENTATION RATE] IN BLOOD BY WESTERGREN METHOD: 98 MM/HR
EST. GFR  (AFRICAN AMERICAN): 44.9 ML/MIN/1.73 M^2
EST. GFR  (NON AFRICAN AMERICAN): 38.9 ML/MIN/1.73 M^2
GLUCOSE SERPL-MCNC: 143 MG/DL
HCT VFR BLD AUTO: 31.1 %
HGB BLD-MCNC: 9.5 G/DL
IMM GRANULOCYTES # BLD AUTO: 0.02 K/UL
IMM GRANULOCYTES NFR BLD AUTO: 0.2 %
LYMPHOCYTES # BLD AUTO: 2.1 K/UL
LYMPHOCYTES NFR BLD: 22.8 %
MCH RBC QN AUTO: 24.1 PG
MCHC RBC AUTO-ENTMCNC: 30.5 G/DL
MCV RBC AUTO: 79 FL
MONOCYTES # BLD AUTO: 0.5 K/UL
MONOCYTES NFR BLD: 5.5 %
NEUTROPHILS # BLD AUTO: 6.3 K/UL
NEUTROPHILS NFR BLD: 66.9 %
NRBC BLD-RTO: 0 /100 WBC
PLATELET # BLD AUTO: 464 K/UL
PMV BLD AUTO: 8.8 FL
POTASSIUM SERPL-SCNC: 3.6 MMOL/L
PROT SERPL-MCNC: 8.3 G/DL
RBC # BLD AUTO: 3.94 M/UL
SODIUM SERPL-SCNC: 138 MMOL/L
WBC # BLD AUTO: 9.38 K/UL

## 2019-03-18 PROCEDURE — 86140 C-REACTIVE PROTEIN: CPT

## 2019-03-18 PROCEDURE — 85652 RBC SED RATE AUTOMATED: CPT

## 2019-03-18 PROCEDURE — 85025 COMPLETE CBC W/AUTO DIFF WBC: CPT

## 2019-03-18 PROCEDURE — 80053 COMPREHEN METABOLIC PANEL: CPT

## 2019-03-20 ENCOUNTER — HOSPITAL ENCOUNTER (OUTPATIENT)
Dept: WOUND CARE | Facility: HOSPITAL | Age: 69
Discharge: HOME | End: 2019-03-20
Attending: SURGERY
Payer: MEDICARE

## 2019-03-20 VITALS
WEIGHT: 171 LBS | TEMPERATURE: 99 F | RESPIRATION RATE: 18 BRPM | HEIGHT: 62 IN | DIASTOLIC BLOOD PRESSURE: 84 MMHG | BODY MASS INDEX: 31.47 KG/M2 | HEART RATE: 89 BPM | SYSTOLIC BLOOD PRESSURE: 147 MMHG

## 2019-03-20 DIAGNOSIS — Z16.12 INFECTION DUE TO ESBL-PRODUCING ESCHERICHIA COLI: ICD-10-CM

## 2019-03-20 DIAGNOSIS — Z98.890 S/P EXPLORATORY LAPAROTOMY: ICD-10-CM

## 2019-03-20 DIAGNOSIS — I10 ESSENTIAL HYPERTENSION: ICD-10-CM

## 2019-03-20 DIAGNOSIS — L02.211 ABDOMINAL WALL ABSCESS: ICD-10-CM

## 2019-03-20 DIAGNOSIS — R10.32 LLQ PAIN: ICD-10-CM

## 2019-03-20 DIAGNOSIS — L02.211 ABSCESS OF ABDOMINAL WALL: ICD-10-CM

## 2019-03-20 DIAGNOSIS — T14.8XXA DRAINAGE FROM WOUND: Primary | ICD-10-CM

## 2019-03-20 DIAGNOSIS — A49.8 INFECTION DUE TO ESBL-PRODUCING ESCHERICHIA COLI: ICD-10-CM

## 2019-03-20 DIAGNOSIS — E11.628 DIABETES WITH SKIN COMPLICATION: ICD-10-CM

## 2019-03-20 PROCEDURE — 99211 OFF/OP EST MAY X REQ PHY/QHP: CPT

## 2019-03-20 NOTE — PROGRESS NOTES
"[facility]                Progress Note    Subjective:       Patient ID: Azucena Morrison is a 68 y.o. female.    Chief Complaint: Non-healing Wound (abdomen-midline)    6/29/17: Pt re-admit for distal abdominal wound. Pt denies any fevers or chills but states she feels nauseous at times. Returned to [place] June 28, from [place], reports much trouble with abdominal wound while in [place].  7/6/17-- Patient scheduled for surgical drainage of wound Tuesday 7/11/17DB  7/19/17-- Patient post op clinic visit.  8/16/17: CT of abdomen and pelvis done 8/9/17 due to suspected fistula  8/23/17-- U/S of abdomen done today.  12/13/17 Wound vac with 20cc drainage in clinic today.  1/10/18: on PO abx  1/24/18: Fistulagram ordered per Dr. Horton. Patient still on PO abx  02/21/18 Patient is not on antibiotics at this time. BS fasting 135 per patient report this am.  03/07/18 Culture of Anterior Abdominal Wound is positive. No antibiotics at this time.  fasting per patient report.    03/21/18 Patient c/o nausea along with abdominal tenderness near abd midline wound. Patient states that pain level is 6 and that she passed two sero-sanguineous clots from wound. Patient is afebrile this am.  3/28/18: patient continue antibiotics and reports that pain is less than last weeks clinic visit. Drainage has decreased as well  04/04/18 Patient states that abdominal pain is "pressure" sensation and that when she pushes down on her abdomen on either side of abdominal wound she feels like she has to urinate. Patient reports pain level of 3 this am.  fasting this am per patient report.   5/2/18: Patient had Abdomina Toribio today before wound care. She had discontinued Bactrim PO per Dr. Horton's recommendation and culture results and is now taking Amoxicillin PO  6/20/18: Pt reports itching to wound and periwound   6/27/18: patient reports no new complaints with regards to her wound " or abdomen, wound continues to decrease in size and drainage  8/1/18: Pt reports increased pain to abdomen with nausea and emesis since thursday 7/26/18, denies any fevers, patient did no go to ER as instructed by home health nurse on Sat. 7/28/18.  8/29/18: Patient admitted to hospital 8/2 for abdominal wound complications. Surgery for abdominal abscess per Dr. Horton on 8/3. Patient placed on IV antibiotics and discharged home on 8/18 with [facility]  and PICC line to [facility]. Patient currently on IV ertapenem. IV medication sent to patients home per [facility].   9/5/18: IV Ertapenem to continue 1 week. Culture sent to lab. [facility]  sent orders. PICC line intact to [facility].  9/12/18 Antibiotics completed. Culture results pending. 1 deep stitch remaining.  9/12/18: Culture positive for E. Coli, patient to continue Ertapenem IV antibiotics x 2weeks. [facility] notified  9/26/18: F/U abdominal wound, wound continues to improve. Patient will complete IV antibiotics today  10/3/18: patient c/o diarrhea for 2 days and nausea with some vomiting. Labs and stool culture ordered. IV antibiotics discontinued today  10/10/2018 patient will start on IV antibiotic Invaz IV once a day, pending PICC line placement, order placed today  for PICC per Dr. Horton.  10/24/18: patient on IV antibiotics, tolerating well. Continue for 1 week  11/7/2018: IV Antibiotic discontinued.  11/8/2018: PICC line to left upper arm discontinued by Home Health.  11/21/2018 F/u for abdominal wall fistula , c/o nausea  No vomiting , no fever  12/5/2018: F/u for abdominal wall fisula, c/o gas, Dr. Horton will order Bentyl. Surgery schedule for January 2019.  12/12/2018: F/u for abdominal wall fistula, c/o abdominal pain. Dr. Horton ordered Norco 5/325mg tab 1 PO every 4 hours as needed for pain and referred patient for x-ray of abdomen after clinic today.  12/19/2018: F/u for abdominal wall fisula, c/o nausea, Dr. Horton re ordered  Ondansetron (Zofran) 8mg one tab by mouth every eight hours as needed for nausea. No changes in wound condition from last visit noted in clinic today.  12/26/2018: F/u abdominal wall fistula drainage, no c/o at this time. Denies any abdominal pain or nausea. Dr. Horton recommends surgery first week of January 2019 for abdominal fistula treatment and possible colostomy placement, patient agree.  01/02/2019: F/u abdominal wall fistula. Dressing with large amount of drainage, malodorous, Dr. Horton is scheduling surgery for third week of January, 2019.  1/16/19: F/U Dr. Horton today in clinic, surgery scheduled for next Friday 1/25/19 with Dr. Horton.     01/23/2019: Presents to wound care clinic for f/u abdominal wound care tx. Surgery scheduled with Dr. Horton for Friday 01/25/2019. Dr. Horton explained Mrs. Morrison surgery procedure including possible complications, Nurse  (Czech) present, patient verbalizes understanding of procedure including possible complications, all questions from patient were answered by Dr. Horton including blood sugar and blood pressure medications per patient's concerns. Consent for surgery and blood transfusion signed by patient, Dr. Horton and nurse witnessed.  Abdominal wound cultures collected per Dr. Horton, cultures sent to lab/micro pending results. Dr. Horton prescribed the following orders: fleet enema for Thursday 1/24/2019, clear liquid diet and not to eat after midnight all for 1/24/2019. Start taking Neomycin and erythromycin by mouth three times a day (1/24/2019) and dulcolax one tab by mouth twice a day, Mrs. Morrison voices understanding.     01/30/2019: F/U wound care treatment with Dr. Horton. Per pt, primary physician Dr. Pj Monae ordered for her to take potassium pills for three days, last day today and scheduled for lab work at primary physician clinic on 1/31/2019. Per pt. Feeling better, no more abdominal pain (went to ER 1/24/2019 and  "discharged 1/25/2019 c/o abd pain.). Dr. Horton awaiting on primary clearance to re-schedule surgery, per pt. She will call wound care clinic and Dr. Horton to make aware of clearance day. Continue with same orders for dressing change for Dr. Horton. Mrs. Morrison requesting gentamicin refill.     02/06/2019: F/u with Dr. Horton for wound care treatment. Mrs. Morrison brought to clinic a clearance for surgery by Dr. Pj Sanches dated 02/06/2019 "Hyperkalemia-Resolved K 4.8 2/1/2019, labs , Cr. 1.05, H/H 10/30. No contraindication to surgery, tight control of BS, Hydration." A copy of EKG (1/24/2019) and lab work from [facility] collected 1/31/2019, reported results 2/1/2019. Dr. Horton scheduled surgery for 02/22/2019, consent were signed on 01/23/2019, all questions were answered by Dr. Horton, this nurse  (Sinhala) present. Education provided to patient on the importance of having a clear liquid diet the day before surgery 02/21/2019 as per Dr. Horton, new medications and preop preparation before surgery, verbalizes understanding. Dr. Horton ordered Norco 5/325 mg PO for pain, Dulcolax two tabs by mouth to take on 02/21/2019, Soap suds enema (SSE) on Thursday 02/21/2019, Golytely by mouth 2 liters on 2/21/2019, Erythromycin 500 mg one tab by mouth three times a day and Neomycin 1 gm by mouth three times a day.     2/13/2019: Presents to wound care clinic for a f/u with Dr. Horton for wound care treatment. No new orders in wound dressing change, reviewed preop orders with Mrs. Morrison per Dr. Horton, all questions answered. Surgery scheduled for 02/22/2019.  2/20/19: Continues wound care a previously ordered.  Depth measured per Dr. Horton 8cm.  Preop orders reviewed with patient who verbalized understanding.  Surgery scheduled for Fri 2/22/19.  Rx given to patient for Norco and Zofran.    03/06/19: Patient admitted to [facility] on 02/22/19 for exploratory laparotomy of " "abdomen per Dr. Horton. Patient discharged on 02/28/19 with home health and PICC line with IV antibiotics. Staples removed today in clinic. Patient denies any fever, chills, n&v; however, she states that she has "low energy." Continued with wound care as ordered.     3/13/2019: F/U with Dr. Horton for wound care treatment. Continue with same wound dressing change orders as per Dr. Horton, orders followed. Home Health to continue wound dressing change and lab draw for CBC weekly; continue IV Ertapenem/Invaz 1 gm IV daily as ordered.    3/20/2019: Clinic visit with Dr. Horton for abdominal abscess treatment. Presents with moderate draining from abdominal wound; wound size decreasing per measurement. Per Dr. Horton, apply one-piece system ostomy bag to abdominal wound for drain collection, may drain bag when it fills and may change every other day, continue applying gentamicin to wound bed before applying ostomy bag. Continue IV Invaz until completion. If ostomy bag not effective for draining collection, may return to previous orders for wound dressing change. Orders followed. Education provided to Mrs. Morrison on handwashing and ostomy care techniques, voices and demonstrates understanding.            Review of Systems   Constitutional: Negative.  Negative for chills and fever.   HENT: Negative.    Eyes: Negative.    Respiratory: Negative.    Cardiovascular: Negative.    Gastrointestinal: Negative.    Genitourinary: Negative.    Musculoskeletal: Negative.    Skin: Positive for color change, rash and wound.   Neurological: Negative.    Psychiatric/Behavioral: Negative.          Objective:        Physical Exam   Constitutional: She is oriented to person, place, and time. She appears well-developed and well-nourished.   HENT:   Head: Normocephalic.   Eyes: Conjunctivae and EOM are normal. Pupils are equal, round, and reactive to light.   Neck: Normal range of motion. Neck supple.   Cardiovascular: Normal rate, " regular rhythm, normal heart sounds and intact distal pulses.   Pulmonary/Chest: Effort normal and breath sounds normal.   Abdominal: Soft. Bowel sounds are normal.   Musculoskeletal: Normal range of motion.   Neurological: She is alert and oriented to person, place, and time. She has normal reflexes.   Skin: Skin is warm and dry.       Vitals:    03/20/19 0835   BP: (!) 147/84   Pulse: 89   Resp: 18   Temp: 98.5 °F (36.9 °C)       Assessment:           ICD-10-CM ICD-9-CM   1. Drainage from wound T14.8XXA 879.8   2. Abscess of abdominal wall L02.211 682.2            Incision/Site 02/22/19 1146 Abdomen midline midline (Active)   02/22/19 1146    Present Prior to Hospital Arrival?:    Side:    Location: Abdomen   Orientation: midline   Incision Type: midline   Closure Method: Staples   Additional Comments:    Removal Indication and Assessment:    Wound Outcome:    Removal Indications:    Dressing Appearance Intact;Moist drainage 3/20/2019  9:00 AM   Drainage Amount Moderate 3/20/2019  9:00 AM   Drainage Characteristics/Odor Serosanguineous;Creamy;Yellow;No odor 3/20/2019  9:00 AM   Appearance Pink;Red 3/20/2019  9:00 AM   Red (%), Wound Tissue Color 100 % 3/20/2019  9:00 AM   Periwound Area Intact 3/20/2019  9:00 AM   Wound Edges Defined 3/20/2019  9:00 AM   Wound Length (cm) 4.9 cm 3/20/2019  9:00 AM   Wound Width (cm) 3.2 cm 3/20/2019  9:00 AM   Wound Depth (cm) 1 cm 3/20/2019  9:00 AM   Wound Volume (cm^3) 15.68 cm^3 3/20/2019  9:00 AM   Wound Surface Area (cm^2) 15.68 cm^2 3/20/2019  9:00 AM   Care Cleansed with:;Sterile normal saline 3/20/2019  9:00 AM   Dressing Applied 3/20/2019  9:00 AM   Periwound Care Skin barrier film applied 3/20/2019  9:00 AM   Dressing Change Due 03/22/19 3/20/2019  9:00 AM         Cleanse wound with:  Normal Saline.  Periwound:  - Cavion, benzoin or cohesive paste to periwound; betamethasone periwoind prn itching.  Antimicrobial Ointment/Cream:  Gentamycin to wound bed.  Secondary  dressing: Apply one-system ostomy bag, secure with mefix tape.   Do not probe depth of wound    Home Health: [facility]: Home health nurse to perform wound assessment and dressing change on Mon, Wed, Fri except when in clinic. Next clinic appt on Wed 03/26/19. Home Health to draw CBC lab every week.    If ostomy bag not effective for draining collection, may resume order:  Cleanse wound with:  Normal Saline.  Periwound:  - Cavion or benzoin to periwound; betamethasone periwoind prn itching.  Antimicrobial Ointment/Cream:  Gentamycin to wound bed.  Secondary dressing: Apply iodoform gauze over wound, cover with aquacel extra, 4x4 gauze, small abd pad, secure with mefix tape.   Do not probe depth of wound      Plan:               Follow-up in about 7 days (around 3/27/2019). Clinic visit with Dr. Horton.  Continue IV antibiotic Invaz until completion  Ostomy bag for draining collection  If ostomy bag not effective for wound drainage, may resume previous wound care dressing orders.

## 2019-03-25 ENCOUNTER — LAB VISIT (OUTPATIENT)
Dept: LAB | Facility: HOSPITAL | Age: 69
End: 2019-03-25
Attending: SURGERY
Payer: MEDICARE

## 2019-03-25 DIAGNOSIS — L02.211 ABSCESS OF ABDOMINAL WALL: Primary | ICD-10-CM

## 2019-03-25 LAB
BASOPHILS # BLD AUTO: 0.05 K/UL (ref 0–0.2)
BASOPHILS NFR BLD: 0.5 % (ref 0–1.9)
DIFFERENTIAL METHOD: ABNORMAL
EOSINOPHIL # BLD AUTO: 0.5 K/UL (ref 0–0.5)
EOSINOPHIL NFR BLD: 4.5 % (ref 0–8)
ERYTHROCYTE [DISTWIDTH] IN BLOOD BY AUTOMATED COUNT: 14.6 % (ref 11.5–14.5)
ERYTHROCYTE [SEDIMENTATION RATE] IN BLOOD BY WESTERGREN METHOD: 117 MM/HR (ref 0–36)
HCT VFR BLD AUTO: 30.8 % (ref 37–48.5)
HGB BLD-MCNC: 9.6 G/DL (ref 12–16)
IMM GRANULOCYTES # BLD AUTO: 0.05 K/UL (ref 0–0.04)
IMM GRANULOCYTES NFR BLD AUTO: 0.5 % (ref 0–0.5)
LYMPHOCYTES # BLD AUTO: 2.3 K/UL (ref 1–4.8)
LYMPHOCYTES NFR BLD: 21.1 % (ref 18–48)
MCH RBC QN AUTO: 23.7 PG (ref 27–31)
MCHC RBC AUTO-ENTMCNC: 31.2 G/DL (ref 32–36)
MCV RBC AUTO: 76 FL (ref 82–98)
MONOCYTES # BLD AUTO: 0.6 K/UL (ref 0.3–1)
MONOCYTES NFR BLD: 5.7 % (ref 4–15)
NEUTROPHILS # BLD AUTO: 7.5 K/UL (ref 1.8–7.7)
NEUTROPHILS NFR BLD: 67.7 % (ref 38–73)
NRBC BLD-RTO: 0 /100 WBC
PLATELET # BLD AUTO: 475 K/UL (ref 150–350)
PMV BLD AUTO: 9 FL (ref 9.2–12.9)
RBC # BLD AUTO: 4.05 M/UL (ref 4–5.4)
WBC # BLD AUTO: 10.97 K/UL (ref 3.9–12.7)

## 2019-03-25 PROCEDURE — 80053 COMPREHEN METABOLIC PANEL: CPT

## 2019-03-25 PROCEDURE — 85652 RBC SED RATE AUTOMATED: CPT

## 2019-03-25 PROCEDURE — 85025 COMPLETE CBC W/AUTO DIFF WBC: CPT

## 2019-03-25 PROCEDURE — 86140 C-REACTIVE PROTEIN: CPT

## 2019-03-26 LAB
ALBUMIN SERPL BCP-MCNC: 3 G/DL (ref 3.5–5.2)
ALP SERPL-CCNC: 90 U/L (ref 55–135)
ALT SERPL W/O P-5'-P-CCNC: 16 U/L (ref 10–44)
ANION GAP SERPL CALC-SCNC: 14 MMOL/L (ref 8–16)
AST SERPL-CCNC: 24 U/L (ref 10–40)
BILIRUB SERPL-MCNC: 0.2 MG/DL (ref 0.1–1)
BUN SERPL-MCNC: 19 MG/DL (ref 8–23)
CALCIUM SERPL-MCNC: 10.1 MG/DL (ref 8.7–10.5)
CHLORIDE SERPL-SCNC: 99 MMOL/L (ref 95–110)
CO2 SERPL-SCNC: 25 MMOL/L (ref 23–29)
CREAT SERPL-MCNC: 1.2 MG/DL (ref 0.5–1.4)
CRP SERPL-MCNC: 76.2 MG/L (ref 0–8.2)
EST. GFR  (AFRICAN AMERICAN): 54 ML/MIN/1.73 M^2
EST. GFR  (NON AFRICAN AMERICAN): 46.9 ML/MIN/1.73 M^2
GLUCOSE SERPL-MCNC: 139 MG/DL (ref 70–110)
POTASSIUM SERPL-SCNC: 4 MMOL/L (ref 3.5–5.1)
PROT SERPL-MCNC: 8.9 G/DL (ref 6–8.4)
SODIUM SERPL-SCNC: 138 MMOL/L (ref 136–145)

## 2019-03-27 ENCOUNTER — TELEPHONE (OUTPATIENT)
Dept: ADMINISTRATIVE | Facility: CLINIC | Age: 69
End: 2019-03-27

## 2019-03-27 ENCOUNTER — HOSPITAL ENCOUNTER (OUTPATIENT)
Dept: WOUND CARE | Facility: HOSPITAL | Age: 69
Discharge: HOME | End: 2019-03-27
Attending: SURGERY
Payer: MEDICARE

## 2019-03-27 VITALS
HEART RATE: 80 BPM | TEMPERATURE: 98 F | DIASTOLIC BLOOD PRESSURE: 83 MMHG | SYSTOLIC BLOOD PRESSURE: 160 MMHG | HEIGHT: 62 IN | WEIGHT: 171 LBS | BODY MASS INDEX: 31.47 KG/M2

## 2019-03-27 DIAGNOSIS — L02.211 ABSCESS OF ABDOMINAL WALL: ICD-10-CM

## 2019-03-27 DIAGNOSIS — E11.622 TYPE 2 DIABETES MELLITUS WITH OTHER SKIN ULCER, UNSPECIFIED WHETHER LONG TERM INSULIN USE: ICD-10-CM

## 2019-03-27 DIAGNOSIS — L98.499 TYPE 2 DIABETES MELLITUS WITH OTHER SKIN ULCER, UNSPECIFIED WHETHER LONG TERM INSULIN USE: ICD-10-CM

## 2019-03-27 DIAGNOSIS — K63.2 FISTULA OF INTESTINE, EXCLUDING RECTUM AND ANUS: ICD-10-CM

## 2019-03-27 DIAGNOSIS — I10 ESSENTIAL HYPERTENSION: ICD-10-CM

## 2019-03-27 DIAGNOSIS — R10.32 LLQ PAIN: ICD-10-CM

## 2019-03-27 DIAGNOSIS — Z98.890 S/P EXPLORATORY LAPAROTOMY: ICD-10-CM

## 2019-03-27 DIAGNOSIS — T14.8XXA DRAINAGE FROM WOUND: Primary | ICD-10-CM

## 2019-03-27 PROCEDURE — 99213 OFFICE O/P EST LOW 20 MIN: CPT

## 2019-03-27 NOTE — PROGRESS NOTES
"Subjective:       Patient ID: Azucena Morrison is a 68 y.o. female.    Chief Complaint: No chief complaint on file.    6/29/17: Pt re-admit for distal abdominal wound. Pt denies any fevers or chills but states she feels nauseous at times. Returned to [place] June 28, from [place], reports much trouble with abdominal wound while in [place].  7/6/17-- Patient scheduled for surgical drainage of wound Tuesday 7/11/17DB  7/19/17-- Patient post op clinic visit.  8/16/17: CT of abdomen and pelvis done 8/9/17 due to suspected fistula  8/23/17-- U/S of abdomen done today.  12/13/17 Wound vac with 20cc drainage in clinic today.  1/10/18: on PO abx  1/24/18: Fistulagram ordered per Dr. Horton. Patient still on PO abx  02/21/18 Patient is not on antibiotics at this time. BS fasting 135 per patient report this am.  03/07/18 Culture of Anterior Abdominal Wound is positive. No antibiotics at this time.  fasting per patient report.    03/21/18 Patient c/o nausea along with abdominal tenderness near abd midline wound. Patient states that pain level is 6 and that she passed two sero-sanguineous clots from wound. Patient is afebrile this am.  3/28/18: patient continue antibiotics and reports that pain is less than last weeks clinic visit. Drainage has decreased as well  04/04/18 Patient states that abdominal pain is "pressure" sensation and that when she pushes down on her abdomen on either side of abdominal wound she feels like she has to urinate. Patient reports pain level of 3 this am.  fasting this am per patient report.   5/2/18: Patient had Abdomina Toribio today before wound care. She had discontinued Bactrim PO per Dr. Horton's recommendation and culture results and is now taking Amoxicillin PO  6/20/18: Pt reports itching to wound and periwound   6/27/18: patient reports no new complaints with regards to her wound or abdomen, wound continues to decrease in size and drainage  8/1/18: Pt reports increased pain to " abdomen with nausea and emesis since thursday 7/26/18, denies any fevers, patient did no go to ER as instructed by home health nurse on Sat. 7/28/18.  8/29/18: Patient admitted to hospital 8/2 for abdominal wound complications. Surgery for abdominal abscess per Dr. Horton on 8/3. Patient placed on IV antibiotics and discharged home on 8/18 with [facility]  and PICC line to LUE. Patient currently on IV ertapenem. IV medication sent to patients home per [facility].   9/5/18: IV Ertapenem to continue 1 week. Culture sent to lab. [facility]  sent orders. PICC line intact to E.  9/12/18 Antibiotics completed. Culture results pending. 1 deep stitch remaining.  9/12/18: Culture positive for E. Coli, patient to continue Ertapenem IV antibiotics x 2weeks. [facility] notified  9/26/18: F/U abdominal wound, wound continues to improve. Patient will complete IV antibiotics today  10/3/18: patient c/o diarrhea for 2 days and nausea with some vomiting. Labs and stool culture ordered. IV antibiotics discontinued today  10/10/2018 patient will start on IV antibiotic Invaz IV once a day, pending PICC line placement, order placed today  for PICC per Dr. Horton.  10/24/18: patient on IV antibiotics, tolerating well. Continue for 1 week  11/7/2018: IV Antibiotic discontinued.  11/8/2018: PICC line to left upper arm discontinued by Home Health.  11/21/2018 F/u for abdominal wall fistula , c/o nausea  No vomiting , no fever  12/5/2018: F/u for abdominal wall fisula, c/o gas, Dr. Horton will order Bentyl. Surgery schedule for January 2019.  12/12/2018: F/u for abdominal wall fistula, c/o abdominal pain. Dr. Horton ordered Norco 5/325mg tab 1 PO every 4 hours as needed for pain and referred patient for x-ray of abdomen after clinic today.  12/19/2018: F/u for abdominal wall fisula, c/o nausea, Dr. Horton re ordered Ondansetron (Zofran) 8mg one tab by mouth every eight hours as needed for nausea. No changes in wound  condition from last visit noted in clinic today.  12/26/2018: F/u abdominal wall fistula drainage, no c/o at this time. Denies any abdominal pain or nausea. Dr. Horton recommends surgery first week of January 2019 for abdominal fistula treatment and possible colostomy placement, patient agree.  01/02/2019: F/u abdominal wall fistula. Dressing with large amount of drainage, malodorous, Dr. Horton is scheduling surgery for third week of January, 2019.  1/16/19: F/U Dr. Horton today in clinic, surgery scheduled for next Friday 1/25/19 with Dr. Horton.     01/23/2019: Presents to wound care clinic for f/u abdominal wound care tx. Surgery scheduled with Dr. Horton for Friday 01/25/2019. Dr. Horton explained Mrs. Morrison surgery procedure including possible complications, Nurse  ([place]) present, patient verbalizes understanding of procedure including possible complications, all questions from patient were answered by Dr. Horton including blood sugar and blood pressure medications per patient's concerns. Consent for surgery and blood transfusion signed by patient, Dr. Horton and nurse witnessed.  Abdominal wound cultures collected per Dr. Horton, cultures sent to lab/micro pending results. Dr. Horton prescribed the following orders: fleet enema for Thursday 1/24/2019, clear liquid diet and not to eat after midnight all for 1/24/2019. Start taking Neomycin and erythromycin by mouth three times a day (1/24/2019) and dulcolax one tab by mouth twice a day, Mrs. Morrison voices understanding.     01/30/2019: F/U wound care treatment with Dr. Horton. Per pt, primary physician Dr. Pj Monae ordered for her to take potassium pills for three days, last day today and scheduled for lab work at primary physician clinic on 1/31/2019. Per pt. Feeling better, no more abdominal pain (went to ER 1/24/2019 and discharged 1/25/2019 c/o abd pain.). Dr. Horton awaiting on primary clearance to re-schedule surgery, per  "pt. She will call wound care clinic and Dr. Horton to make aware of clearance day. Continue with same orders for dressing change for Dr. Horton. Mrs. Morrison requesting gentamicin refill.     02/06/2019: F/u with Dr. Horton for wound care treatment. Mrs. Morrison brought to clinic a clearance for surgery by Dr. Pj Sanches dated 02/06/2019 "Hyperkalemia-Resolved K 4.8 2/1/2019, labs , Cr. 1.05, H/H 10/30. No contraindication to surgery, tight control of BS, Hydration." A copy of EKG (1/24/2019) and lab work from [facility] collected 1/31/2019, reported results 2/1/2019. Dr. Horton scheduled surgery for 02/22/2019, consent were signed on 01/23/2019, all questions were answered by Dr. Horton, this nurse  ([place] citizen) present. Education provided to patient on the importance of having a clear liquid diet the day before surgery 02/21/2019 as per Dr. Horton, new medications and preop preparation before surgery, verbalizes understanding. Dr. Horton ordered Norco 5/325 mg PO for pain, Dulcolax two tabs by mouth to take on 02/21/2019, Soap suds enema (SSE) on Thursday 02/21/2019, Golytely by mouth 2 liters on 2/21/2019, Erythromycin 500 mg one tab by mouth three times a day and Neomycin 1 gm by mouth three times a day.     2/13/2019: Presents to wound care clinic for a f/u with Dr. Horton for wound care treatment. No new orders in wound dressing change, reviewed preop orders with Mrs. Morrison per Dr. Horton, all questions answered. Surgery scheduled for 02/22/2019.  2/20/19: Continues wound care a previously ordered.  Depth measured per Dr. Horton 8cm.  Preop orders reviewed with patient who verbalized understanding.  Surgery scheduled for Fri 2/22/19.  Rx given to patient for Norco and Zofran.    03/06/19: Patient admitted to [facility] on 02/22/19 for exploratory laparotomy of abdomen per Dr. Horton. Patient discharged on 02/28/19 with home health and PICC line with IV antibiotics. " Admission Reconciliation is Completed  Discharge Reconciliation is Completed "Staples removed today in clinic. Patient denies any fever, chills, n&v; however, she states that she has "low energy." Continued with wound care as ordered.     3/13/2019: F/U with Dr. Horton for wound care treatment. Continue with same wound dressing change orders as per Dr. Horton, orders followed. Home Health to continue wound dressing change and lab draw for CBC weekly; continue IV Ertapenem/Invaz 1 gm IV daily as ordered.    3/20/2019: Clinic visit with Dr. Horton for abdominal abscess treatment. Presents with moderate draining from abdominal wound; wound size decreasing per measurement. Per Dr. Horton, apply one-piece system ostomy bag to abdominal wound for drain collection, may drain bag when it fills and may change every other day, continue applying gentamicin to wound bed before applying ostomy bag. Continue IV Invaz until completion. If ostomy bag not effective for draining collection, may return to previous orders for wound dressing change. Orders followed. Education provided to Mrs. Morrison on handwashing and ostomy care techniques, voices and demonstrates understanding.   3/27/19: Follow up with Dr. Horton today in clinic for abdominal abscess, moderated tanish yellow drainge present on dressing.  Wound slowly improving, patient refused one-piece ostomy bag stated " no it's too messy." requested to return to previous dressing prior to ostomy bag placement- iodoform gauze, aquacel extra, sm abd, and mefix tape.  Silver nitrate x1 per Dr. Horton today in clinic. Rx given to patient for PO Zofran.        Review of Systems   Constitutional: Negative.    HENT: Negative.    Eyes: Negative.    Respiratory: Negative.    Cardiovascular: Negative.    Gastrointestinal: Negative.    Genitourinary: Negative.    Musculoskeletal: Negative.    Skin: Negative.    Neurological: Negative.    Psychiatric/Behavioral: Negative.        Objective:      Physical Exam   Constitutional: She is oriented to person, place, " and time. She appears well-developed and well-nourished.   HENT:   Head: Normocephalic.   Eyes: Pupils are equal, round, and reactive to light. Conjunctivae and EOM are normal.   Neck: Normal range of motion. Neck supple.   Cardiovascular: Normal rate, regular rhythm, normal heart sounds and intact distal pulses.   Pulmonary/Chest: Effort normal and breath sounds normal.   Abdominal: Soft. Bowel sounds are normal.   Musculoskeletal: Normal range of motion.   Neurological: She is alert and oriented to person, place, and time. She has normal reflexes.   Skin: Skin is warm and dry.       Assessment:       No diagnosis found.       Incision/Site 02/22/19 1146 Abdomen midline midline (Active)   02/22/19 1146    Present Prior to Hospital Arrival?:    Side:    Location: Abdomen   Orientation: midline   Incision Type: midline   Closure Method: Staples   Additional Comments:    Removal Indication and Assessment:    Wound Outcome:    Removal Indications:    Dressing Appearance Intact;Moist drainage 3/27/2019  9:01 AM   Drainage Amount Moderate 3/27/2019  9:01 AM   Drainage Characteristics/Odor Serosanguineous;Tan;Yellow 3/27/2019  9:01 AM   Appearance Red;Pink 3/27/2019  9:01 AM   Red (%), Wound Tissue Color 100 % 3/27/2019  9:01 AM   Periwound Area Intact 3/27/2019  9:01 AM   Wound Edges Defined 3/27/2019  9:01 AM   Wound Length (cm) 3.7 cm 3/27/2019  9:01 AM   Wound Width (cm) 2.6 cm 3/27/2019  9:01 AM   Wound Depth (cm) 1 cm 3/27/2019  9:01 AM   Wound Volume (cm^3) 9.62 cm^3 3/27/2019  9:01 AM   Wound Surface Area (cm^2) 9.62 cm^2 3/27/2019  9:01 AM   Care Cleansed with:;Sterile normal saline 3/27/2019  9:01 AM   Dressing Applied 3/27/2019  9:01 AM   Periwound Care Absorptive dressing applied;Skin barrier film applied 3/27/2019  9:01 AM   Dressing Change Due 03/29/19 3/27/2019  9:01 AM   Abdominal wound   Cleanse wound with:  Normal Saline.  Periwound:  - Cavion or benzoin to periwound; betamethasone periwoind prn  itching.  Antimicrobial Ointment/Cream:  Gentamycin to wound bed.  Secondary dressing: Apply iodoform gauze over wound, cover with aquacel extra, 4x4 gauze, small abd pad, secure with mefix tape.     Do not probe depth of wound  Frequency:  Mon, Wed, Fri  Follow-up in 1 week with Dr. Horton Wed 04/03/19    Home Health: [facility]: Home health nurse to perform wound assessment and dressing change on Mon, Wed, Fri except when in clinic. Next clinic appt on Wed 03/26/19. Home Health to draw CBC lab every week.      Plan:                Follow up 1 week Dr. Horton

## 2019-04-01 ENCOUNTER — LAB VISIT (OUTPATIENT)
Dept: LAB | Facility: HOSPITAL | Age: 69
End: 2019-04-01
Attending: SURGERY
Payer: MEDICARE

## 2019-04-01 DIAGNOSIS — R63.2 GLUTTONY: Primary | ICD-10-CM

## 2019-04-01 LAB
ALBUMIN SERPL BCP-MCNC: 2.7 G/DL (ref 3.5–5.2)
ALP SERPL-CCNC: 86 U/L (ref 55–135)
ALT SERPL W/O P-5'-P-CCNC: 17 U/L (ref 10–44)
ANION GAP SERPL CALC-SCNC: 12 MMOL/L (ref 8–16)
AST SERPL-CCNC: 25 U/L (ref 10–40)
BILIRUB SERPL-MCNC: 0.2 MG/DL (ref 0.1–1)
BUN SERPL-MCNC: 14 MG/DL (ref 8–23)
CALCIUM SERPL-MCNC: 9.2 MG/DL (ref 8.7–10.5)
CHLORIDE SERPL-SCNC: 103 MMOL/L (ref 95–110)
CO2 SERPL-SCNC: 24 MMOL/L (ref 23–29)
CREAT SERPL-MCNC: 1.1 MG/DL (ref 0.5–1.4)
CRP SERPL-MCNC: 35.6 MG/L (ref 0–8.2)
ERYTHROCYTE [DISTWIDTH] IN BLOOD BY AUTOMATED COUNT: 15.6 % (ref 11.5–14.5)
ERYTHROCYTE [SEDIMENTATION RATE] IN BLOOD BY WESTERGREN METHOD: 84 MM/HR (ref 0–36)
EST. GFR  (AFRICAN AMERICAN): >60 ML/MIN/1.73 M^2
EST. GFR  (NON AFRICAN AMERICAN): 52.1 ML/MIN/1.73 M^2
GLUCOSE SERPL-MCNC: 201 MG/DL (ref 70–110)
HCT VFR BLD AUTO: 28.9 % (ref 37–48.5)
HGB BLD-MCNC: 9 G/DL (ref 12–16)
MCH RBC QN AUTO: 24.3 PG (ref 27–31)
MCHC RBC AUTO-ENTMCNC: 31.1 G/DL (ref 32–36)
MCV RBC AUTO: 78 FL (ref 82–98)
PLATELET # BLD AUTO: 372 K/UL (ref 150–350)
PMV BLD AUTO: 9.1 FL (ref 9.2–12.9)
POTASSIUM SERPL-SCNC: 3.5 MMOL/L (ref 3.5–5.1)
PROT SERPL-MCNC: 7.9 G/DL (ref 6–8.4)
RBC # BLD AUTO: 3.7 M/UL (ref 4–5.4)
SODIUM SERPL-SCNC: 139 MMOL/L (ref 136–145)
WBC # BLD AUTO: 7.74 K/UL (ref 3.9–12.7)

## 2019-04-01 PROCEDURE — 85652 RBC SED RATE AUTOMATED: CPT

## 2019-04-01 PROCEDURE — 86140 C-REACTIVE PROTEIN: CPT

## 2019-04-01 PROCEDURE — 80053 COMPREHEN METABOLIC PANEL: CPT

## 2019-04-01 PROCEDURE — 85027 COMPLETE CBC AUTOMATED: CPT

## 2019-04-03 ENCOUNTER — HOSPITAL ENCOUNTER (OUTPATIENT)
Dept: WOUND CARE | Facility: HOSPITAL | Age: 69
Discharge: HOME | End: 2019-04-03
Attending: SURGERY
Payer: MEDICARE

## 2019-04-03 VITALS
WEIGHT: 165 LBS | SYSTOLIC BLOOD PRESSURE: 159 MMHG | RESPIRATION RATE: 18 BRPM | TEMPERATURE: 99 F | HEART RATE: 85 BPM | BODY MASS INDEX: 30.36 KG/M2 | HEIGHT: 62 IN | DIASTOLIC BLOOD PRESSURE: 82 MMHG

## 2019-04-03 DIAGNOSIS — K63.2 COLOCUTANEOUS FISTULA: ICD-10-CM

## 2019-04-03 DIAGNOSIS — Z79.4 TYPE 2 DIABETES MELLITUS WITH OTHER SKIN ULCER, WITH LONG-TERM CURRENT USE OF INSULIN: ICD-10-CM

## 2019-04-03 DIAGNOSIS — L02.211 ABDOMINAL WALL ABSCESS: Primary | ICD-10-CM

## 2019-04-03 DIAGNOSIS — Z98.890 S/P EXPLORATORY LAPAROTOMY: ICD-10-CM

## 2019-04-03 DIAGNOSIS — K63.2 COLONIC FISTULA: ICD-10-CM

## 2019-04-03 DIAGNOSIS — E11.622 TYPE 2 DIABETES MELLITUS WITH OTHER SKIN ULCER, WITH LONG-TERM CURRENT USE OF INSULIN: ICD-10-CM

## 2019-04-03 DIAGNOSIS — I10 ESSENTIAL HYPERTENSION: ICD-10-CM

## 2019-04-03 PROCEDURE — 87075 CULTR BACTERIA EXCEPT BLOOD: CPT

## 2019-04-03 PROCEDURE — 87070 CULTURE OTHR SPECIMN AEROBIC: CPT

## 2019-04-03 PROCEDURE — 99202 OFFICE O/P NEW SF 15 MIN: CPT

## 2019-04-03 NOTE — PROGRESS NOTES
"Subjective:       Patient ID: Azucena Morrison is a 68 y.o. female.    Chief Complaint: Non-healing Wound (Abdomen)    6/29/17: Pt re-admit for distal abdominal wound. Pt denies any fevers or chills but states she feels nauseous at times. Returned to [place] June 28, from [place], reports much trouble with abdominal wound while in [place].  7/6/17-- Patient scheduled for surgical drainage of wound Tuesday 7/11/17DB  7/19/17-- Patient post op clinic visit.  8/16/17: CT of abdomen and pelvis done 8/9/17 due to suspected fistula  8/23/17-- U/S of abdomen done today.  12/13/17 Wound vac with 20cc drainage in clinic today.  1/10/18: on PO abx  1/24/18: Fistulagram ordered per Dr. Horton. Patient still on PO abx  02/21/18 Patient is not on antibiotics at this time. BS fasting 135 per patient report this am.  03/07/18 Culture of Anterior Abdominal Wound is positive. No antibiotics at this time.  fasting per patient report.    03/21/18 Patient c/o nausea along with abdominal tenderness near abd midline wound. Patient states that pain level is 6 and that she passed two sero-sanguineous clots from wound. Patient is afebrile this am.  3/28/18: patient continue antibiotics and reports that pain is less than last weeks clinic visit. Drainage has decreased as well  04/04/18 Patient states that abdominal pain is "pressure" sensation and that when she pushes down on her abdomen on either side of abdominal wound she feels like she has to urinate. Patient reports pain level of 3 this am.  fasting this am per patient report.   5/2/18: Patient had Abdomina Toribio today before wound care. She had discontinued Bactrim PO per Dr. Horton's recommendation and culture results and is now taking Amoxicillin PO  6/20/18: Pt reports itching to wound and periwound   6/27/18: patient reports no new complaints with regards to her wound or abdomen, wound continues to decrease in size and drainage  8/1/18: Pt reports increased pain to " abdomen with nausea and emesis since thursday 7/26/18, denies any fevers, patient did no go to ER as instructed by home health nurse on Sat. 7/28/18.  8/29/18: Patient admitted to hospital 8/2 for abdominal wound complications. Surgery for abdominal abscess per Dr. Horton on 8/3. Patient placed on IV antibiotics and discharged home on 8/18 with [facility]  and PICC line to LUE. Patient currently on IV ertapenem. IV medication sent to patients home per [facility].   9/5/18: IV Ertapenem to continue 1 week. Culture sent to lab. [facility]  sent orders. PICC line intact to E.  9/12/18 Antibiotics completed. Culture results pending. 1 deep stitch remaining.  9/12/18: Culture positive for E. Coli, patient to continue Ertapenem IV antibiotics x 2weeks. [facility] notified  9/26/18: F/U abdominal wound, wound continues to improve. Patient will complete IV antibiotics today  10/3/18: patient c/o diarrhea for 2 days and nausea with some vomiting. Labs and stool culture ordered. IV antibiotics discontinued today  10/10/2018 patient will start on IV antibiotic Invaz IV once a day, pending PICC line placement, order placed today  for PICC per Dr. Horton.  10/24/18: patient on IV antibiotics, tolerating well. Continue for 1 week  11/7/2018: IV Antibiotic discontinued.  11/8/2018: PICC line to left upper arm discontinued by Home Health.  11/21/2018 F/u for abdominal wall fistula , c/o nausea  No vomiting , no fever  12/5/2018: F/u for abdominal wall fisula, c/o gas, Dr. Horton will order Bentyl. Surgery schedule for January 2019.  12/12/2018: F/u for abdominal wall fistula, c/o abdominal pain. Dr. Horton ordered Norco 5/325mg tab 1 PO every 4 hours as needed for pain and referred patient for x-ray of abdomen after clinic today.  12/19/2018: F/u for abdominal wall fisula, c/o nausea, Dr. Horton re ordered Ondansetron (Zofran) 8mg one tab by mouth every eight hours as needed for nausea. No changes in wound  condition from last visit noted in clinic today.  12/26/2018: F/u abdominal wall fistula drainage, no c/o at this time. Denies any abdominal pain or nausea. Dr. Horton recommends surgery first week of January 2019 for abdominal fistula treatment and possible colostomy placement, patient agree.  01/02/2019: F/u abdominal wall fistula. Dressing with large amount of drainage, malodorous, Dr. Horton is scheduling surgery for third week of January, 2019.  1/16/19: F/U Dr. Horton today in clinic, surgery scheduled for next Friday 1/25/19 with Dr. Horton.     01/23/2019: Presents to wound care clinic for f/u abdominal wound care tx. Surgery scheduled with Dr. Horton for Friday 01/25/2019. Dr. Horton explained Mrs. Morrison surgery procedure including possible complications, Nurse  ([place]) present, patient verbalizes understanding of procedure including possible complications, all questions from patient were answered by Dr. Horton including blood sugar and blood pressure medications per patient's concerns. Consent for surgery and blood transfusion signed by patient, Dr. Horton and nurse witnessed.  Abdominal wound cultures collected per Dr. Horton, cultures sent to lab/micro pending results. Dr. Horton prescribed the following orders: fleet enema for Thursday 1/24/2019, clear liquid diet and not to eat after midnight all for 1/24/2019. Start taking Neomycin and erythromycin by mouth three times a day (1/24/2019) and dulcolax one tab by mouth twice a day, Mrs. Morrison voices understanding.     01/30/2019: F/U wound care treatment with Dr. Horton. Per pt, primary physician Dr. Pj Monae ordered for her to take potassium pills for three days, last day today and scheduled for lab work at primary physician clinic on 1/31/2019. Per pt. Feeling better, no more abdominal pain (went to ER 1/24/2019 and discharged 1/25/2019 c/o abd pain.). Dr. Horton awaiting on primary clearance to re-schedule surgery, per  "pt. She will call wound care clinic and Dr. Horton to make aware of clearance day. Continue with same orders for dressing change for Dr. Horton. Mrs. Morrison requesting gentamicin refill.     02/06/2019: F/u with Dr. Horton for wound care treatment. Mrs. Morrison brought to clinic a clearance for surgery by Dr. Pj Sanches dated 02/06/2019 "Hyperkalemia-Resolved K 4.8 2/1/2019, labs , Cr. 1.05, H/H 10/30. No contraindication to surgery, tight control of BS, Hydration." A copy of EKG (1/24/2019) and lab work from [employer] [facility] collected 1/31/2019, reported results 2/1/2019. Dr. Horton scheduled surgery for 02/22/2019, consent were signed on 01/23/2019, all questions were answered by Dr. Horton, this nurse  ([place]) present. Education provided to patient on the importance of having a clear liquid diet the day before surgery 02/21/2019 as per Dr. Horton, new medications and preop preparation before surgery, verbalizes understanding. Dr. Horton ordered Norco 5/325 mg PO for pain, Dulcolax two tabs by mouth to take on 02/21/2019, Soap suds enema (SSE) on Thursday 02/21/2019, Golytely by mouth 2 liters on 2/21/2019, Erythromycin 500 mg one tab by mouth three times a day and Neomycin 1 gm by mouth three times a day.     2/13/2019: Presents to wound care clinic for a f/u with Dr. Horton for wound care treatment. No new orders in wound dressing change, reviewed preop orders with Mrs. Morrison per Dr. Horton, all questions answered. Surgery scheduled for 02/22/2019.  2/20/19: Continues wound care a previously ordered.  Depth measured per Dr. Horton 8cm.  Preop orders reviewed with patient who verbalized understanding.  Surgery scheduled for Fri 2/22/19.  Rx given to patient for Norco and Zofran.    03/06/19: Patient admitted to [facility] on 02/22/19 for exploratory laparotomy of abdomen per Dr. Horton. Patient discharged on 02/28/19 with home health and PICC line with IV antibiotics. " "Staples removed today in clinic. Patient denies any fever, chills, n&v; however, she states that she has "low energy." Continued with wound care as ordered.     3/13/2019: F/U with Dr. Horton for wound care treatment. Continue with same wound dressing change orders as per Dr. Horton, orders followed. Home Health to continue wound dressing change and lab draw for CBC weekly; continue IV Ertapenem/Invaz 1 gm IV daily as ordered.     3/20/2019: Clinic visit with Dr. Horton for abdominal abscess treatment. Presents with moderate draining from abdominal wound; wound size decreasing per measurement. Per Dr. Horton, apply one-piece system ostomy bag to abdominal wound for drain collection, may drain bag when it fills and may change every other day, continue applying gentamicin to wound bed before applying ostomy bag. Continue IV Invaz until completion. If ostomy bag not effective for draining collection, may return to previous orders for wound dressing change. Orders followed. Education provided to Mrs. Morrison on handwashing and ostomy care techniques, voices and demonstrates understanding.   3/27/19: Follow up with Dr. Horton today in clinic for abdominal abscess, moderated tanish yellow drainge present on dressing.  Wound slowly improving, patient refused one-piece ostomy bag stated " no it's too messy." requested to return to previous dressing prior to ostomy bag placement- iodoform gauze, aquacel extra, sm abd, and mefix tape.  Silver nitrate x1 per Dr. Horton today in clinic. Rx given to patient for PO Zofran.    04/03/2019: F/u clinic visit with Dr. Horton. Abdominal wound improving in size, picture on file. Wound cultures from abdominal abscess collected and sent to lab/micro, pending results. Mrs. Morrison states going out of the country ([place]) at the end of April and plans to stay there for approximately two months. Dr. Horton ordered IV antibiotic for two more weeks and new wound care dressing, " orders followed.    Review of Systems   Constitutional: Negative.    HENT: Negative.    Eyes: Negative.    Respiratory: Negative.    Cardiovascular: Negative.    Gastrointestinal: Negative.    Genitourinary: Negative.    Musculoskeletal: Negative.    Skin: Negative.    Neurological: Negative.    Psychiatric/Behavioral: Negative.        Objective:      Physical Exam   Constitutional: She is oriented to person, place, and time. She appears well-developed and well-nourished.   HENT:   Head: Normocephalic.   Eyes: Pupils are equal, round, and reactive to light. Conjunctivae and EOM are normal.   Neck: Normal range of motion. Neck supple.   Cardiovascular: Normal rate, regular rhythm, normal heart sounds and intact distal pulses.   Pulmonary/Chest: Effort normal and breath sounds normal.   Abdominal: Soft. Bowel sounds are normal.   Musculoskeletal: Normal range of motion.   Neurological: She is alert and oriented to person, place, and time. She has normal reflexes.   Skin: Skin is warm and dry.       Assessment:       1. Abdominal wall abscess           Incision/Site 02/22/19 1146 Abdomen midline midline (Active)   02/22/19 1146    Present Prior to Hospital Arrival?:    Side:    Location: Abdomen   Orientation: midline   Incision Type: midline   Closure Method: Staples   Additional Comments:    Removal Indication and Assessment:    Wound Outcome:    Removal Indications:    Wound Image    4/3/2019  8:40 AM   Dressing Appearance Moist drainage 4/3/2019  8:40 AM   Drainage Amount Small 4/3/2019  8:40 AM   Drainage Characteristics/Odor Serosanguineous 4/3/2019  8:40 AM   Appearance Pink;Red;Moist;Fibrin 4/3/2019  8:40 AM   Red (%), Wound Tissue Color 100 % 4/3/2019  8:40 AM   Periwound Area Intact;Hemosiderin Staining 4/3/2019  8:40 AM   Wound Edges Defined 4/3/2019  8:40 AM   Wound Length (cm) 2.2 cm 4/3/2019  8:40 AM   Wound Width (cm) 1.9 cm 4/3/2019  8:40 AM   Wound Depth (cm) 1 cm 4/3/2019  8:40 AM   Wound Volume  (cm^3) 4.18 cm^3 4/3/2019  8:40 AM   Wound Surface Area (cm^2) 4.18 cm^2 4/3/2019  8:40 AM   Care Cleansed with:;Sterile normal saline 4/3/2019  8:40 AM   Dressing Applied;Gauze 4/3/2019  8:40 AM   Periwound Care Skin barrier film applied 4/3/2019  8:40 AM   Dressing Change Due 04/05/19 4/3/2019  8:40 AM         Abdominal wound   Cleanse wound with:  Normal Saline.  Periwound:  - Cavion or benzoin to periwound; betamethasone periwoind prn itching.  Antimicrobial Ointment/Cream:  Gentamycin to wound bed.  Secondary dressing: Apply iodoform gauze over wound, 4x4 gauze, small abd pad, secure with 2 large Mepore dressing 9 x 20 cm   Do not probe depth of wound      Plan:            Follow up in about 1 week (around 4/10/2019).  Continue IV antibiotic for two more weeks.  Wound cultures sent to lab/micro 4/3/2019, pending results.

## 2019-04-06 LAB — BACTERIA SPEC AEROBE CULT: NO GROWTH

## 2019-04-08 ENCOUNTER — LAB VISIT (OUTPATIENT)
Dept: LAB | Facility: HOSPITAL | Age: 69
End: 2019-04-08
Attending: SURGERY
Payer: MEDICARE

## 2019-04-08 DIAGNOSIS — K63.2 FISTULA OF INTESTINE, EXCLUDING RECTUM AND ANUS: Primary | ICD-10-CM

## 2019-04-08 LAB
ALBUMIN SERPL BCP-MCNC: 3 G/DL (ref 3.5–5.2)
ALP SERPL-CCNC: 87 U/L (ref 55–135)
ALT SERPL W/O P-5'-P-CCNC: 20 U/L (ref 10–44)
ANION GAP SERPL CALC-SCNC: 10 MMOL/L (ref 8–16)
AST SERPL-CCNC: 24 U/L (ref 10–40)
BILIRUB SERPL-MCNC: 0.2 MG/DL (ref 0.1–1)
BUN SERPL-MCNC: 14 MG/DL (ref 8–23)
CALCIUM SERPL-MCNC: 9.9 MG/DL (ref 8.7–10.5)
CHLORIDE SERPL-SCNC: 100 MMOL/L (ref 95–110)
CO2 SERPL-SCNC: 28 MMOL/L (ref 23–29)
CREAT SERPL-MCNC: 1.1 MG/DL (ref 0.5–1.4)
CRP SERPL-MCNC: 37.8 MG/L (ref 0–8.2)
ERYTHROCYTE [DISTWIDTH] IN BLOOD BY AUTOMATED COUNT: 16.5 % (ref 11.5–14.5)
ERYTHROCYTE [SEDIMENTATION RATE] IN BLOOD BY WESTERGREN METHOD: 116 MM/HR (ref 0–36)
EST. GFR  (AFRICAN AMERICAN): >60 ML/MIN/1.73 M^2
EST. GFR  (NON AFRICAN AMERICAN): 52.1 ML/MIN/1.73 M^2
GLUCOSE SERPL-MCNC: 185 MG/DL (ref 70–110)
HCT VFR BLD AUTO: 30.7 % (ref 37–48.5)
HGB BLD-MCNC: 9.4 G/DL (ref 12–16)
MCH RBC QN AUTO: 24 PG (ref 27–31)
MCHC RBC AUTO-ENTMCNC: 30.6 G/DL (ref 32–36)
MCV RBC AUTO: 78 FL (ref 82–98)
PLATELET # BLD AUTO: 406 K/UL (ref 150–350)
PMV BLD AUTO: 9.3 FL (ref 9.2–12.9)
POTASSIUM SERPL-SCNC: 3.5 MMOL/L (ref 3.5–5.1)
PROT SERPL-MCNC: 8.2 G/DL (ref 6–8.4)
RBC # BLD AUTO: 3.92 M/UL (ref 4–5.4)
SODIUM SERPL-SCNC: 138 MMOL/L (ref 136–145)
WBC # BLD AUTO: 8.18 K/UL (ref 3.9–12.7)

## 2019-04-08 PROCEDURE — 80053 COMPREHEN METABOLIC PANEL: CPT

## 2019-04-08 PROCEDURE — 85652 RBC SED RATE AUTOMATED: CPT

## 2019-04-08 PROCEDURE — 85027 COMPLETE CBC AUTOMATED: CPT

## 2019-04-08 PROCEDURE — 86140 C-REACTIVE PROTEIN: CPT

## 2019-04-10 ENCOUNTER — HOSPITAL ENCOUNTER (OUTPATIENT)
Dept: WOUND CARE | Facility: HOSPITAL | Age: 69
Discharge: HOME | End: 2019-04-10
Attending: SURGERY
Payer: MEDICARE

## 2019-04-10 VITALS
BODY MASS INDEX: 30.36 KG/M2 | HEART RATE: 77 BPM | HEIGHT: 62 IN | RESPIRATION RATE: 18 BRPM | TEMPERATURE: 98 F | WEIGHT: 165 LBS | DIASTOLIC BLOOD PRESSURE: 83 MMHG | SYSTOLIC BLOOD PRESSURE: 178 MMHG

## 2019-04-10 DIAGNOSIS — Z16.12 INFECTION DUE TO ESBL-PRODUCING ESCHERICHIA COLI: ICD-10-CM

## 2019-04-10 DIAGNOSIS — L02.211 ABDOMINAL WALL ABSCESS: Primary | ICD-10-CM

## 2019-04-10 DIAGNOSIS — L98.499 TYPE 2 DIABETES MELLITUS WITH OTHER SKIN ULCER, UNSPECIFIED WHETHER LONG TERM INSULIN USE: ICD-10-CM

## 2019-04-10 DIAGNOSIS — A49.8 INFECTION DUE TO ESBL-PRODUCING ESCHERICHIA COLI: ICD-10-CM

## 2019-04-10 DIAGNOSIS — E11.622 TYPE 2 DIABETES MELLITUS WITH OTHER SKIN ULCER, UNSPECIFIED WHETHER LONG TERM INSULIN USE: ICD-10-CM

## 2019-04-10 DIAGNOSIS — L02.211 ABSCESS OF ABDOMINAL WALL: ICD-10-CM

## 2019-04-10 DIAGNOSIS — Z98.890 S/P EXPLORATORY LAPAROTOMY: ICD-10-CM

## 2019-04-10 DIAGNOSIS — R10.32 LLQ PAIN: ICD-10-CM

## 2019-04-10 DIAGNOSIS — K63.2 COLONIC FISTULA: ICD-10-CM

## 2019-04-10 LAB — BACTERIA SPEC ANAEROBE CULT: NORMAL

## 2019-04-10 PROCEDURE — 99212 OFFICE O/P EST SF 10 MIN: CPT

## 2019-04-10 PROCEDURE — 87070 CULTURE OTHR SPECIMN AEROBIC: CPT

## 2019-04-10 PROCEDURE — 87075 CULTR BACTERIA EXCEPT BLOOD: CPT

## 2019-04-10 NOTE — PROGRESS NOTES
This nurse spoke to Mrs. Maza at [facility] at [phone] in reference to IV antibiotic Ertapenem/Invaz 1g IV, per Dr. Horton, continue IV daily for one more week. Per Mrs. Maza, will send antibiotic to Mrs. Morrison's house as ordered.

## 2019-04-10 NOTE — PROGRESS NOTES
"[facility]                Progress Note    Subjective:       Patient ID: Azucena Morrison is a 68 y.o. female.    Chief Complaint: Non-healing Wound (Abdomen)    6/29/17: Pt re-admit for distal abdominal wound. Pt denies any fevers or chills but states she feels nauseous at times. Returned to [place] June 28, from [place], reports much trouble with abdominal wound while in [place].  7/6/17-- Patient scheduled for surgical drainage of wound Tuesday 7/11/17DB  7/19/17-- Patient post op clinic visit.  8/16/17: CT of abdomen and pelvis done 8/9/17 due to suspected fistula  8/23/17-- U/S of abdomen done today.  12/13/17 Wound vac with 20cc drainage in clinic today.  1/10/18: on PO abx  1/24/18: Fistulagram ordered per Dr. Horton. Patient still on PO abx  02/21/18 Patient is not on antibiotics at this time. BS fasting 135 per patient report this am.  03/07/18 Culture of Anterior Abdominal Wound is positive. No antibiotics at this time.  fasting per patient report.    03/21/18 Patient c/o nausea along with abdominal tenderness near abd midline wound. Patient states that pain level is 6 and that she passed two sero-sanguineous clots from wound. Patient is afebrile this am.  3/28/18: patient continue antibiotics and reports that pain is less than last weeks clinic visit. Drainage has decreased as well  04/04/18 Patient states that abdominal pain is "pressure" sensation and that when she pushes down on her abdomen on either side of abdominal wound she feels like she has to urinate. Patient reports pain level of 3 this am.  fasting this am per patient report.   5/2/18: Patient had Abdomina Toribio today before wound care. She had discontinued Bactrim PO per Dr. Horton's recommendation and culture results and is now taking Amoxicillin PO  6/20/18: Pt reports itching to wound and periwound   6/27/18: patient reports no new complaints with regards to her wound or " abdomen, wound continues to decrease in size and drainage  8/1/18: Pt reports increased pain to abdomen with nausea and emesis since thursday 7/26/18, denies any fevers, patient did no go to ER as instructed by home health nurse on Sat. 7/28/18.  8/29/18: Patient admitted to hospital 8/2 for abdominal wound complications. Surgery for abdominal abscess per Dr. Horton on 8/3. Patient placed on IV antibiotics and discharged home on 8/18 with [facility]  and [place] [facility]. Patient currently on IV ertapenem. IV medication sent to patients home [facility].   9/5/18: IV Ertapenem to continue 1 week. Culture sent to lab. [facility]  sent orders. [place] [facility]  9/12/18 Antibiotics completed. Culture results pending. 1 deep stitch remaining.  9/12/18: Culture positive for E. Coli, patient to continue Ertapenem IV antibiotics x 2weeks. [facility] notified  9/26/18: F/U abdominal wound, wound continues to improve. Patient will complete IV antibiotics today  10/3/18: patient c/o diarrhea for 2 days and nausea with some vomiting. Labs and stool culture ordered. IV antibiotics discontinued today  10/10/2018 patient will start on IV antibiotic Invaz IV once a day, pending PICC line placement, order placed today  for PICC per Dr. Horton.  10/24/18: patient on IV antibiotics, tolerating well. Continue for 1 week  11/7/2018: IV Antibiotic discontinued.  11/8/2018: PICC line to left upper arm discontinued by Home Health.  11/21/2018 F/u for abdominal wall fistula , c/o nausea  No vomiting , no fever  12/5/2018: F/u for abdominal wall fisula, c/o gas, Dr. Horton will order Bentyl. Surgery schedule for January 2019.  12/12/2018: F/u for abdominal wall fistula, c/o abdominal pain. Dr. Horton ordered Norco 5/325mg tab 1 PO every 4 hours as needed for pain and referred patient for x-ray of abdomen after clinic today.  12/19/2018: F/u for abdominal wall fisula, c/o nausea, Dr. Horton re ordered  Ondansetron (Zofran) 8mg one tab by mouth every eight hours as needed for nausea. No changes in wound condition from last visit noted in clinic today.  12/26/2018: F/u abdominal wall fistula drainage, no c/o at this time. Denies any abdominal pain or nausea. Dr. Horton recommends surgery first week of January 2019 for abdominal fistula treatment and possible colostomy placement, patient agree.  01/02/2019: F/u abdominal wall fistula. Dressing with large amount of drainage, malodorous, Dr. Horton is scheduling surgery for third week of January, 2019.  1/16/19: F/U Dr. Horton today in clinic, surgery scheduled for next Friday 1/25/19 with Dr. Horton.     01/23/2019: Presents to wound care clinic for f/u abdominal wound care tx. Surgery scheduled with Dr. Horton for Friday 01/25/2019. Dr. Horton explained Mrs. Morrison surgery procedure including possible complications, Nurse  (Upper sorbian) present, patient verbalizes understanding of procedure including possible complications, all questions from patient were answered by Dr. Horton including blood sugar and blood pressure medications per patient's concerns. Consent for surgery and blood transfusion signed by patient, Dr. Horton and nurse witnessed.  Abdominal wound cultures collected per Dr. Horton, cultures sent to lab/micro pending results. Dr. Horton prescribed the following orders: fleet enema for Thursday 1/24/2019, clear liquid diet and not to eat after midnight all for 1/24/2019. Start taking Neomycin and erythromycin by mouth three times a day (1/24/2019) and dulcolax one tab by mouth twice a day, Mrs. Morrison voices understanding.     01/30/2019: F/U wound care treatment with Dr. Horton. Per pt, primary physician Dr. Pj Monae ordered for her to take potassium pills for three days, last day today and scheduled for lab work at primary physician clinic on 1/31/2019. Per pt. Feeling better, no more abdominal pain (went to ER 1/24/2019 and  "discharged 1/25/2019 c/o abd pain.). Dr. Horton awaiting on primary clearance to re-schedule surgery, per pt. She will call wound care clinic and Dr. Horton to make aware of clearance day. Continue with same orders for dressing change for Dr. Horton. Mrs. Morrison requesting gentamicin refill.     02/06/2019: F/u with Dr. Horton for wound care treatment. Mrs. Morrison brought to clinic a clearance for surgery by Dr. Pj Sanches dated 02/06/2019 "Hyperkalemia-Resolved K 4.8 2/1/2019, labs , Cr. 1.05, H/H 10/30. No contraindication to surgery, tight control of BS, Hydration." A copy of EKG (1/24/2019) and lab work from [facility] collected 1/31/2019, reported results 2/1/2019. Dr. Horton scheduled surgery for 02/22/2019, consent were signed on 01/23/2019, all questions were answered by Dr. Horton, this nurse  (Serbian) present. Education provided to patient on the importance of having a clear liquid diet the day before surgery 02/21/2019 as per Dr. Horton, new medications and preop preparation before surgery, verbalizes understanding. Dr. Horton ordered Norco 5/325 mg PO for pain, Dulcolax two tabs by mouth to take on 02/21/2019, Soap suds enema (SSE) on Thursday 02/21/2019, Golytely by mouth 2 liters on 2/21/2019, Erythromycin 500 mg one tab by mouth three times a day and Neomycin 1 gm by mouth three times a day.     2/13/2019: Presents to wound care clinic for a f/u with Dr. Horton for wound care treatment. No new orders in wound dressing change, reviewed preop orders with Mrs. Morrison per Dr. Horton, all questions answered. Surgery scheduled for 02/22/2019.  2/20/19: Continues wound care a previously ordered.  Depth measured per Dr. Horton 8cm.  Preop orders reviewed with patient who verbalized understanding.  Surgery scheduled for Fri 2/22/19.  Rx given to patient for Norco and Zofran.    03/06/19: Patient admitted to [facility] on 02/22/19 for exploratory laparotomy of " "abdomen per Dr. Horton. Patient discharged on 02/28/19 with home health and PICC line with IV antibiotics. Staples removed today in clinic. Patient denies any fever, chills, n&v; however, she states that she has "low energy." Continued with wound care as ordered.     3/13/2019: F/U with Dr. Horton for wound care treatment. Continue with same wound dressing change orders as per Dr. Horton, orders followed. Home Health to continue wound dressing change and lab draw for CBC weekly; continue IV Ertapenem/Invaz 1 gm IV daily as ordered.     3/20/2019: Clinic visit with Dr. Horton for abdominal abscess treatment. Presents with moderate draining from abdominal wound; wound size decreasing per measurement. Per Dr. Horton, apply one-piece system ostomy bag to abdominal wound for drain collection, may drain bag when it fills and may change every other day, continue applying gentamicin to wound bed before applying ostomy bag. Continue IV Invaz until completion. If ostomy bag not effective for draining collection, may return to previous orders for wound dressing change. Orders followed. Education provided to Mrs. Morrison on handwashing and ostomy care techniques, voices and demonstrates understanding.   3/27/19: Follow up with Dr. Horton today in clinic for abdominal abscess, moderated tanish yellow drainge present on dressing.  Wound slowly improving, patient refused one-piece ostomy bag stated " no it's too messy." requested to return to previous dressing prior to ostomy bag placement- iodoform gauze, aquacel extra, sm abd, and mefix tape.  Silver nitrate x1 per Dr. Horton today in clinic. Rx given to patient for PO Zofran.     04/03/2019: F/u clinic visit with Dr. Horton. Abdominal wound improving in size, picture on file. Wound cultures from abdominal abscess collected and sent to lab/micro, pending results. Mrs. Morrison states going out of the country ([place]) at the end of April and plans to stay there for " approximately two months. Dr. Horton ordered IV antibiotic for two more weeks and new wound care dressing, orders followed.     4/9/2019: Clinic visit with Dr. Horton.  Per Dr. Horton, wound deep measurement increased, draining present. Wound cultures from abdominal wound taken and sent to lab/micro, pending results. C/o abdominal pain and itching around wound, Rx for betamethasone cream 01.% and Norco 5/325mg give in clinic by Dr. Horton. Wound care dressing orders followed. Continue IV antibiotic as previously ordered.          Review of Systems   Constitutional: Negative.    HENT: Negative.    Eyes: Negative.    Respiratory: Negative.    Cardiovascular: Negative.    Gastrointestinal: Negative.    Genitourinary: Negative.    Musculoskeletal: Negative.    Skin: Negative.    Neurological: Negative.    Psychiatric/Behavioral: Negative.          Objective:        Physical Exam   Constitutional: She is oriented to person, place, and time. She appears well-developed and well-nourished.   HENT:   Head: Normocephalic.   Eyes: Pupils are equal, round, and reactive to light. Conjunctivae and EOM are normal.   Neck: Normal range of motion. Neck supple.   Cardiovascular: Normal rate, regular rhythm, normal heart sounds and intact distal pulses.   Pulmonary/Chest: Effort normal and breath sounds normal.   Abdominal: Soft. Bowel sounds are normal.   Musculoskeletal: Normal range of motion.   Neurological: She is alert and oriented to person, place, and time. She has normal reflexes.   Skin: Skin is warm and dry.       Vitals:    04/10/19 0845   BP: (!) 178/83   Pulse: 77   Resp: 18   Temp: 98.4 °F (36.9 °C)       Assessment:           ICD-10-CM ICD-9-CM   1. Abdominal wall abscess L02.211 682.2            Incision/Site 02/22/19 1146 Abdomen midline midline (Active)   02/22/19 1146    Present Prior to Hospital Arrival?:    Side:    Location: Abdomen   Orientation: midline   Incision Type: midline   Closure Method: Staples    Additional Comments:    Removal Indication and Assessment:    Wound Outcome:    Removal Indications:    Dressing Appearance Moist drainage 4/10/2019  8:45 AM   Drainage Amount Small 4/10/2019  8:45 AM   Drainage Characteristics/Odor Serosanguineous 4/10/2019  8:45 AM   Appearance Pink;Red 4/10/2019  8:45 AM   Red (%), Wound Tissue Color 100 % 4/10/2019  8:45 AM   Periwound Area Intact;Hemosiderin Staining 4/10/2019  8:45 AM   Wound Edges Defined 4/10/2019  8:45 AM   Wound Length (cm) 2.1 cm 4/10/2019  8:45 AM   Wound Width (cm) 1.8 cm 4/10/2019  8:45 AM   Wound Depth (cm) 3 cm 4/10/2019  8:45 AM   Wound Volume (cm^3) 11.34 cm^3 4/10/2019  8:45 AM   Wound Surface Area (cm^2) 3.78 cm^2 4/10/2019  8:45 AM   Care Cleansed with:;Sterile normal saline 4/10/2019  8:45 AM   Dressing Applied;Abd pad;Gauze 4/10/2019  8:45 AM   Periwound Care Skin barrier film applied 4/10/2019  8:45 AM   Dressing Change Due 04/12/19 4/10/2019  8:45 AM         Abdominal wound   Cleanse wound with:  Normal Saline.  Periwound:  - Cavion or benzoin to periwound; betamethasone periwoind prn itching.  Antimicrobial Ointment/Cream:  Gentamycin to wound bed.  Secondary dressing: Apply iodoform gauze over wound, 4x4 gauze, small abd pad, secure with 2 large Mepore dressing 9 x 20 cm   Do not probe depth of wound     Plan:           Wound cultures from abdominal wound taken and sent to lab/micro, pending results.    Rx for betamethasone cream 01.% and Norco 5/325mg give in clinic by Dr. Hroton.    Follow up in about 1 week (around 4/17/2019).

## 2019-04-15 ENCOUNTER — LAB VISIT (OUTPATIENT)
Dept: LAB | Facility: HOSPITAL | Age: 69
End: 2019-04-15
Attending: SURGERY
Payer: MEDICARE

## 2019-04-15 DIAGNOSIS — L02.211 ABSCESS OF ABDOMINAL WALL: Primary | ICD-10-CM

## 2019-04-15 LAB
ALBUMIN SERPL BCP-MCNC: 3.1 G/DL (ref 3.5–5.2)
ALP SERPL-CCNC: 91 U/L (ref 55–135)
ALT SERPL W/O P-5'-P-CCNC: 15 U/L (ref 10–44)
ANION GAP SERPL CALC-SCNC: 9 MMOL/L (ref 8–16)
AST SERPL-CCNC: 26 U/L (ref 10–40)
BACTERIA SPEC AEROBE CULT: NO GROWTH
BILIRUB SERPL-MCNC: 0.2 MG/DL (ref 0.1–1)
BUN SERPL-MCNC: 11 MG/DL (ref 8–23)
CALCIUM SERPL-MCNC: 9.4 MG/DL (ref 8.7–10.5)
CHLORIDE SERPL-SCNC: 101 MMOL/L (ref 95–110)
CO2 SERPL-SCNC: 30 MMOL/L (ref 23–29)
CREAT SERPL-MCNC: 0.9 MG/DL (ref 0.5–1.4)
CRP SERPL-MCNC: 31.5 MG/L (ref 0–8.2)
ERYTHROCYTE [DISTWIDTH] IN BLOOD BY AUTOMATED COUNT: 16.7 % (ref 11.5–14.5)
ERYTHROCYTE [SEDIMENTATION RATE] IN BLOOD BY WESTERGREN METHOD: 74 MM/HR (ref 0–36)
EST. GFR  (AFRICAN AMERICAN): >60 ML/MIN/1.73 M^2
EST. GFR  (NON AFRICAN AMERICAN): >60 ML/MIN/1.73 M^2
GLUCOSE SERPL-MCNC: 120 MG/DL (ref 70–110)
HCT VFR BLD AUTO: 30.7 % (ref 37–48.5)
HGB BLD-MCNC: 9.7 G/DL (ref 12–16)
MCH RBC QN AUTO: 24.7 PG (ref 27–31)
MCHC RBC AUTO-ENTMCNC: 31.6 G/DL (ref 32–36)
MCV RBC AUTO: 78 FL (ref 82–98)
PLATELET # BLD AUTO: 394 K/UL (ref 150–350)
PMV BLD AUTO: 9.1 FL (ref 9.2–12.9)
POTASSIUM SERPL-SCNC: 3.4 MMOL/L (ref 3.5–5.1)
PROT SERPL-MCNC: 8.4 G/DL (ref 6–8.4)
RBC # BLD AUTO: 3.93 M/UL (ref 4–5.4)
SODIUM SERPL-SCNC: 140 MMOL/L (ref 136–145)
WBC # BLD AUTO: 8.15 K/UL (ref 3.9–12.7)

## 2019-04-15 PROCEDURE — 85652 RBC SED RATE AUTOMATED: CPT

## 2019-04-15 PROCEDURE — 85027 COMPLETE CBC AUTOMATED: CPT

## 2019-04-15 PROCEDURE — 86140 C-REACTIVE PROTEIN: CPT

## 2019-04-15 PROCEDURE — 80053 COMPREHEN METABOLIC PANEL: CPT

## 2019-04-17 ENCOUNTER — HOSPITAL ENCOUNTER (OUTPATIENT)
Dept: WOUND CARE | Facility: HOSPITAL | Age: 69
Discharge: HOME | End: 2019-04-17
Attending: SURGERY
Payer: MEDICARE

## 2019-04-17 VITALS
WEIGHT: 165 LBS | BODY MASS INDEX: 30.36 KG/M2 | HEIGHT: 62 IN | SYSTOLIC BLOOD PRESSURE: 150 MMHG | DIASTOLIC BLOOD PRESSURE: 86 MMHG | HEART RATE: 73 BPM | TEMPERATURE: 98 F

## 2019-04-17 DIAGNOSIS — E11.622 TYPE 2 DIABETES MELLITUS WITH OTHER SKIN ULCER, UNSPECIFIED WHETHER LONG TERM INSULIN USE: Primary | ICD-10-CM

## 2019-04-17 DIAGNOSIS — L98.499 TYPE 2 DIABETES MELLITUS WITH OTHER SKIN ULCER, UNSPECIFIED WHETHER LONG TERM INSULIN USE: Primary | ICD-10-CM

## 2019-04-17 PROCEDURE — 99213 OFFICE O/P EST LOW 20 MIN: CPT

## 2019-04-17 NOTE — PROGRESS NOTES
"Subjective:       Patient ID: Azucena Morrison is a 68 y.o. female.    Chief Complaint: Non-healing Wound (abdomen)    6/29/17: Pt re-admit for distal abdominal wound. Pt denies any fevers or chills but states she feels nauseous at times. Returned to [place] June 28, from [place], reports much trouble with abdominal wound while in [place].  7/6/17-- Patient scheduled for surgical drainage of wound Tuesday 7/11/17DB  7/19/17-- Patient post op clinic visit.  8/16/17: CT of abdomen and pelvis done 8/9/17 due to suspected fistula  8/23/17-- U/S of abdomen done today.  12/13/17 Wound vac with 20cc drainage in clinic today.  1/10/18: on PO abx  1/24/18: Fistulagram ordered per Dr. Horton. Patient still on PO abx  02/21/18 Patient is not on antibiotics at this time. BS fasting 135 per patient report this am.  03/07/18 Culture of Anterior Abdominal Wound is positive. No antibiotics at this time.  fasting per patient report.    03/21/18 Patient c/o nausea along with abdominal tenderness near abd midline wound. Patient states that pain level is 6 and that she passed two sero-sanguineous clots from wound. Patient is afebrile this am.  3/28/18: patient continue antibiotics and reports that pain is less than last weeks clinic visit. Drainage has decreased as well  04/04/18 Patient states that abdominal pain is "pressure" sensation and that when she pushes down on her abdomen on either side of abdominal wound she feels like she has to urinate. Patient reports pain level of 3 this am.  fasting this am per patient report.   5/2/18: Patient had Abdomina Toribio today before wound care. She had discontinued Bactrim PO per Dr. Horton's recommendation and culture results and is now taking Amoxicillin PO  6/20/18: Pt reports itching to wound and periwound   6/27/18: patient reports no new complaints with regards to her wound or abdomen, wound continues to decrease in size and drainage  8/1/18: Pt reports increased pain to " abdomen with nausea and emesis since thursday 7/26/18, denies any fevers, patient did no go to ER as instructed by home health nurse on Sat. 7/28/18.  8/29/18: Patient admitted to hospital 8/2 for abdominal wound complications. Surgery for abdominal abscess per Dr. Horton on 8/3. Patient placed on IV antibiotics and discharged home on 8/18 with [facility]  and PICC line to LUE. Patient currently on IV ertapenem. IV medication sent to patients home per [facility].   9/5/18: IV Ertapenem to continue 1 week. Culture sent to lab. [facility]  sent orders. PICC line intact to E.  9/12/18 Antibiotics completed. Culture results pending. 1 deep stitch remaining.  9/12/18: Culture positive for E. Coli, patient to continue Ertapenem IV antibiotics x 2weeks. [facility] notified  9/26/18: F/U abdominal wound, wound continues to improve. Patient will complete IV antibiotics today  10/3/18: patient c/o diarrhea for 2 days and nausea with some vomiting. Labs and stool culture ordered. IV antibiotics discontinued today  10/10/2018 patient will start on IV antibiotic Invaz IV once a day, pending PICC line placement, order placed today  for PICC per Dr. Horton.  10/24/18: patient on IV antibiotics, tolerating well. Continue for 1 week  11/7/2018: IV Antibiotic discontinued.  11/8/2018: PICC line to left upper arm discontinued by Home Health.  11/21/2018 F/u for abdominal wall fistula , c/o nausea  No vomiting , no fever  12/5/2018: F/u for abdominal wall fisula, c/o gas, Dr. Horton will order Bentyl. Surgery schedule for January 2019.  12/12/2018: F/u for abdominal wall fistula, c/o abdominal pain. Dr. Horton ordered Norco 5/325mg tab 1 PO every 4 hours as needed for pain and referred patient for x-ray of abdomen after clinic today.  12/19/2018: F/u for abdominal wall fisula, c/o nausea, Dr. Horton re ordered Ondansetron (Zofran) 8mg one tab by mouth every eight hours as needed for nausea. No changes in wound  condition from last visit noted in clinic today.  12/26/2018: F/u abdominal wall fistula drainage, no c/o at this time. Denies any abdominal pain or nausea. Dr. Horton recommends surgery first week of January 2019 for abdominal fistula treatment and possible colostomy placement, patient agree.  01/02/2019: F/u abdominal wall fistula. Dressing with large amount of drainage, malodorous, Dr. Horton is scheduling surgery for third week of January, 2019.  1/16/19: F/U Dr. Horton today in clinic, surgery scheduled for next Friday 1/25/19 with Dr. Horton.     01/23/2019: Presents to wound care clinic for f/u abdominal wound care tx. Surgery scheduled with Dr. Horton for Friday 01/25/2019. Dr. Horton explained Mrs. Morrison surgery procedure including possible complications, Nurse  (Sudanese) present, patient verbalizes understanding of procedure including possible complications, all questions from patient were answered by Dr. Horton including blood sugar and blood pressure medications per patient's concerns. Consent for surgery and blood transfusion signed by patient, Dr. Horton and nurse witnessed.  Abdominal wound cultures collected per Dr. Horton, cultures sent to lab/micro pending results. Dr. Horton prescribed the following orders: fleet enema for Thursday 1/24/2019, clear liquid diet and not to eat after midnight all for 1/24/2019. Start taking Neomycin and erythromycin by mouth three times a day (1/24/2019) and dulcolax one tab by mouth twice a day, Mrs. Morrison voices understanding.     01/30/2019: F/U wound care treatment with Dr. Horton. Per pt, primary physician Dr. Pj Monae ordered for her to take potassium pills for three days, last day today and scheduled for lab work at primary physician clinic on 1/31/2019. Per pt. Feeling better, no more abdominal pain (went to ER 1/24/2019 and discharged 1/25/2019 c/o abd pain.). Dr. Horton awaiting on primary clearance to re-schedule surgery, per  "pt. She will call wound care clinic and Dr. Horton to make aware of clearance day. Continue with same orders for dressing change for Dr. Horton. Mrs. Morrison requesting gentamicin refill.     02/06/2019: F/u with Dr. Horton for wound care treatment. Mrs. Morrison brought to clinic a clearance for surgery by Dr. Pj Sanches dated 02/06/2019 "Hyperkalemia-Resolved K 4.8 2/1/2019, labs , Cr. 1.05, H/H 10/30. No contraindication to surgery, tight control of BS, Hydration." A copy of EKG (1/24/2019) and lab work from [employer] collected 1/31/2019, reported results 2/1/2019. Dr. Horton scheduled surgery for 02/22/2019, consent were signed on 01/23/2019, all questions were answered by Dr. Horton, this nurse  (French) present. Education provided to patient on the importance of having a clear liquid diet the day before surgery 02/21/2019 as per Dr. Horton, new medications and preop preparation before surgery, verbalizes understanding. Dr. Horton ordered Norco 5/325 mg PO for pain, Dulcolax two tabs by mouth to take on 02/21/2019, Soap suds enema (SSE) on Thursday 02/21/2019, Golytely by mouth 2 liters on 2/21/2019, Erythromycin 500 mg one tab by mouth three times a day and Neomycin 1 gm by mouth three times a day.     2/13/2019: Presents to wound care clinic for a f/u with Dr. Hroton for wound care treatment. No new orders in wound dressing change, reviewed preop orders with Mrs. Morrison per Dr. Horton, all questions answered. Surgery scheduled for 02/22/2019.  2/20/19: Continues wound care a previously ordered.  Depth measured per Dr. Horton 8cm.  Preop orders reviewed with patient who verbalized understanding.  Surgery scheduled for Fri 2/22/19.  Rx given to patient for Norco and Zofran.    03/06/19: Patient admitted to [facility] on 02/22/19 for exploratory laparotomy of abdomen per Dr. Horton. Patient discharged on 02/28/19 with home health and PICC line with IV antibiotics. " "Staples removed today in clinic. Patient denies any fever, chills, n&v; however, she states that she has "low energy." Continued with wound care as ordered.     3/13/2019: F/U with Dr. Horton for wound care treatment. Continue with same wound dressing change orders as per Dr. Horton, orders followed. Home Health to continue wound dressing change and lab draw for CBC weekly; continue IV Ertapenem/Invaz 1 gm IV daily as ordered.     3/20/2019: Clinic visit with Dr. Horton for abdominal abscess treatment. Presents with moderate draining from abdominal wound; wound size decreasing per measurement. Per Dr. Horton, apply one-piece system ostomy bag to abdominal wound for drain collection, may drain bag when it fills and may change every other day, continue applying gentamicin to wound bed before applying ostomy bag. Continue IV Invaz until completion. If ostomy bag not effective for draining collection, may return to previous orders for wound dressing change. Orders followed. Education provided to Mrs. Morrison on handwashing and ostomy care techniques, voices and demonstrates understanding.   3/27/19: Follow up with Dr. Horton today in clinic for abdominal abscess, moderated tanish yellow drainge present on dressing.  Wound slowly improving, patient refused one-piece ostomy bag stated " no it's too messy." requested to return to previous dressing prior to ostomy bag placement- iodoform gauze, aquacel extra, sm abd, and mefix tape.  Silver nitrate x1 per Dr. Horton today in clinic. Rx given to patient for PO Zofran.     04/03/2019: F/u clinic visit with Dr. Horton. Abdominal wound improving in size, picture on file. Wound cultures from abdominal abscess collected and sent to lab/micro, pending results. Mrs. Morrison states going out of the country ([place] at the end of April and plans to stay there for approximately two months. Dr. Horton ordered IV antibiotic for two more weeks and new wound care dressing, " orders followed.     4/9/2019: Clinic visit with Dr. Horton.  Per Dr. Horton, wound deep measurement increased, draining present. Wound cultures from abdominal wound taken and sent to lab/micro, pending results. C/o abdominal pain and itching around wound, Rx for betamethasone cream 01.% and Norco 5/325mg give in clinic by Dr. Horton. Wound care dressing orders followed. Continue IV antibiotic as previously ordered.  04/17/19: F/u for non-healing wound to abdomen. Culture report negative. Dr. Horton ordered 1 more week of IV antibiotics. Continue with topical wound care as ordered.    Review of Systems   Constitutional: Negative.    HENT: Negative.    Eyes: Negative.    Respiratory: Negative.    Cardiovascular: Negative.    Gastrointestinal: Negative.    Genitourinary: Negative.    Musculoskeletal: Negative.    Skin: Negative.    Neurological: Negative.    Psychiatric/Behavioral: Negative.        Objective:      Physical Exam   Constitutional: She is oriented to person, place, and time. She appears well-developed and well-nourished.   HENT:   Head: Normocephalic.   Eyes: Pupils are equal, round, and reactive to light. Conjunctivae and EOM are normal.   Neck: Normal range of motion. Neck supple.   Cardiovascular: Normal rate, regular rhythm, normal heart sounds and intact distal pulses.   Pulmonary/Chest: Effort normal and breath sounds normal.   Abdominal: Soft. Bowel sounds are normal.   Musculoskeletal: Normal range of motion.   Neurological: She is alert and oriented to person, place, and time. She has normal reflexes.   Skin: Skin is warm and dry.       Assessment:       1. 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, unspecified whether long term insulin use           Incision/Site 02/22/19 1146 Abdomen midline midline (Active)   02/22/19 1146    Present Prior to Hospital Arrival?:    Side:    Location: Abdomen   Orientation: midline   Incision Type: midline   Closure Method: Staples   Additional  Comments:    Removal Indication and Assessment:    Wound Outcome:    Removal Indications:    Dressing Appearance Intact;Moist drainage 4/17/2019  8:00 AM   Drainage Amount Small 4/17/2019  8:00 AM   Drainage Characteristics/Odor Serosanguineous 4/17/2019  8:00 AM   Appearance Pink;Yellow;Epithelialization 4/17/2019  8:00 AM   Red (%), Wound Tissue Color 90 % 4/17/2019  8:00 AM   Yellow (%), Wound Tissue Color 10 % 4/17/2019  8:00 AM   Periwound Area Intact;Hemosiderin Staining 4/17/2019  8:00 AM   Wound Edges Defined 4/17/2019  8:00 AM   Wound Length (cm) 0.4 cm 4/17/2019  8:00 AM   Wound Width (cm) 0.2 cm 4/17/2019  8:00 AM   Wound Depth (cm) 3 cm 4/17/2019  8:00 AM   Wound Volume (cm^3) 0.24 cm^3 4/17/2019  8:00 AM   Wound Surface Area (cm^2) 0.08 cm^2 4/17/2019  8:00 AM   Care Cleansed with:;Sterile normal saline 4/17/2019  8:00 AM   Dressing Applied;Other (see comments);Island/border;Gauze;Abd pad 4/17/2019  8:00 AM   Periwound Care Skin barrier film applied;Topical treatment applied 4/17/2019  8:00 AM   Dressing Change Due 04/19/19 4/17/2019  8:00 AM       Abdominal wound   Cleanse wound with:  Normal Saline.  Periwound:  - Cavion or benzoin to periwound; betamethasone periwoind prn itching.  Antimicrobial Ointment/Cream:  Gentamycin to wound bed.  Secondary dressing: Apply iodoform gauze over wound, 4x4 gauze, small abd pad, secure with 2 large Mepore dressing 9 x 20 cm   Do not probe depth of wound    Plan:                Follow up in about 1 week (around 4/24/2019) for Dr. Horton.

## 2019-04-23 ENCOUNTER — LAB VISIT (OUTPATIENT)
Dept: LAB | Facility: HOSPITAL | Age: 69
End: 2019-04-23
Attending: SURGERY
Payer: MEDICARE

## 2019-04-23 DIAGNOSIS — T81.89XA NON-HEALING SURGICAL WOUND: Primary | ICD-10-CM

## 2019-04-23 LAB
BACTERIA SPEC ANAEROBE CULT: NORMAL
ERYTHROCYTE [DISTWIDTH] IN BLOOD BY AUTOMATED COUNT: 16.8 % (ref 11.5–14.5)
HCT VFR BLD AUTO: 33.5 % (ref 37–48.5)
HGB BLD-MCNC: 10.6 G/DL (ref 12–16)
MCH RBC QN AUTO: 24.3 PG (ref 27–31)
MCHC RBC AUTO-ENTMCNC: 31.6 G/DL (ref 32–36)
MCV RBC AUTO: 77 FL (ref 82–98)
PLATELET # BLD AUTO: 455 K/UL (ref 150–350)
PMV BLD AUTO: 9.3 FL (ref 9.2–12.9)
RBC # BLD AUTO: 4.37 M/UL (ref 4–5.4)
WBC # BLD AUTO: 10.15 K/UL (ref 3.9–12.7)

## 2019-04-23 PROCEDURE — 85027 COMPLETE CBC AUTOMATED: CPT

## 2019-04-24 ENCOUNTER — HOSPITAL ENCOUNTER (OUTPATIENT)
Dept: WOUND CARE | Facility: HOSPITAL | Age: 69
Discharge: HOME | End: 2019-04-24
Attending: SURGERY
Payer: MEDICARE

## 2019-04-24 VITALS
HEIGHT: 62 IN | TEMPERATURE: 97 F | SYSTOLIC BLOOD PRESSURE: 171 MMHG | BODY MASS INDEX: 30.36 KG/M2 | HEART RATE: 82 BPM | WEIGHT: 165 LBS | RESPIRATION RATE: 18 BRPM | DIASTOLIC BLOOD PRESSURE: 81 MMHG

## 2019-04-24 DIAGNOSIS — I10 ESSENTIAL HYPERTENSION: ICD-10-CM

## 2019-04-24 DIAGNOSIS — Z79.4 TYPE 2 DIABETES MELLITUS WITHOUT COMPLICATION, WITH LONG-TERM CURRENT USE OF INSULIN: ICD-10-CM

## 2019-04-24 DIAGNOSIS — L02.211 ABDOMINAL WALL ABSCESS: Primary | ICD-10-CM

## 2019-04-24 DIAGNOSIS — E11.9 TYPE 2 DIABETES MELLITUS WITHOUT COMPLICATION, WITH LONG-TERM CURRENT USE OF INSULIN: ICD-10-CM

## 2019-04-24 PROCEDURE — 99212 OFFICE O/P EST SF 10 MIN: CPT

## 2019-04-24 RX ORDER — HYDROCODONE BITARTRATE AND ACETAMINOPHEN 5; 325 MG/1; MG/1
TABLET ORAL
Qty: 32 TABLET | Refills: 0 | Status: SHIPPED | OUTPATIENT
Start: 2019-04-24 | End: 2019-07-17 | Stop reason: SDUPTHER

## 2019-04-24 RX ORDER — GENTAMICIN SULFATE 1 MG/G
OINTMENT TOPICAL
Qty: 30 G | Refills: 0 | Status: SHIPPED | OUTPATIENT
Start: 2019-04-24 | End: 2019-07-31 | Stop reason: SDUPTHER

## 2019-04-24 RX ORDER — DICYCLOMINE HYDROCHLORIDE 10 MG/1
CAPSULE ORAL
Qty: 120 CAPSULE | Refills: 0 | Status: SHIPPED | OUTPATIENT
Start: 2019-04-24 | End: 2019-07-31 | Stop reason: SDUPTHER

## 2019-04-24 NOTE — PROGRESS NOTES
"[facility]                Progress Note    Subjective:       Patient ID: Azucena Morrison is a 68 y.o. female.    Chief Complaint: Non-healing Wound (Abdomen)    6/29/17: Pt re-admit for distal abdominal wound. Pt denies any fevers or chills but states she feels nauseous at times. Returned to [place] June 28, from [place], reports much trouble with abdominal wound while in [place].  7/6/17-- Patient scheduled for surgical drainage of wound Tuesday 7/11/17DB  7/19/17-- Patient post op clinic visit.  8/16/17: CT of abdomen and pelvis done 8/9/17 due to suspected fistula  8/23/17-- U/S of abdomen done today.  12/13/17 Wound vac with 20cc drainage in clinic today.  1/10/18: on PO abx  1/24/18: Fistulagram ordered per Dr. Horton. Patient still on PO abx  02/21/18 Patient is not on antibiotics at this time. BS fasting 135 per patient report this am.  03/07/18 Culture of Anterior Abdominal Wound is positive. No antibiotics at this time.  fasting per patient report.    03/21/18 Patient c/o nausea along with abdominal tenderness near abd midline wound. Patient states that pain level is 6 and that she passed two sero-sanguineous clots from wound. Patient is afebrile this am.  3/28/18: patient continue antibiotics and reports that pain is less than last weeks clinic visit. Drainage has decreased as well  04/04/18 Patient states that abdominal pain is "pressure" sensation and that when she pushes down on her abdomen on either side of abdominal wound she feels like she has to urinate. Patient reports pain level of 3 this am.  fasting this am per patient report.   5/2/18: Patient had Abdomina Toribio today before wound care. She had discontinued Bactrim PO per Dr. Horton's recommendation and culture results and is now taking Amoxicillin PO  6/20/18: Pt reports itching to wound and periwound   6/27/18: patient reports no new complaints with regards to her wound or " abdomen, wound continues to decrease in size and drainage  8/1/18: Pt reports increased pain to abdomen with nausea and emesis since thursday 7/26/18, denies any fevers, patient did no go to ER as instructed by home health nurse on Sat. 7/28/18.  8/29/18: Patient admitted to hospital 8/2 for abdominal wound complications. Surgery for abdominal abscess per Dr. Horton on 8/3. Patient placed on IV antibiotics and discharged home on 8/18 with [facility]  and PICC line to [facility]. Patient currently on IV ertapenem. IV medication sent to patients home per [facility].   9/5/18: IV Ertapenem to continue 1 week. Culture sent to lab. [facility]  sent orders. PICC line intact to [facility].  9/12/18 Antibiotics completed. Culture results pending. 1 deep stitch remaining.  9/12/18: Culture positive for E. Coli, patient to continue Ertapenem IV antibiotics x 2weeks. Critical access hospital notified  9/26/18: F/U abdominal wound, wound continues to improve. Patient will complete IV antibiotics today  10/3/18: patient c/o diarrhea for 2 days and nausea with some vomiting. Labs and stool culture ordered. IV antibiotics discontinued today  10/10/2018 patient will start on IV antibiotic Invaz IV once a day, pending PICC line placement, order placed today  for PICC per Dr. Horton.  10/24/18: patient on IV antibiotics, tolerating well. Continue for 1 week  11/7/2018: IV Antibiotic discontinued.  11/8/2018: PICC line to left upper arm discontinued by Home Health.  11/21/2018 F/u for abdominal wall fistula , c/o nausea  No vomiting , no fever  12/5/2018: F/u for abdominal wall fisula, c/o gas, Dr. Horton will order Bentyl. Surgery schedule for January 2019.  12/12/2018: F/u for abdominal wall fistula, c/o abdominal pain. Dr. Horton ordered Norco 5/325mg tab 1 PO every 4 hours as needed for pain and referred patient for x-ray of abdomen after clinic today.  12/19/2018: F/u for abdominal wall fisula, c/o nausea, Dr. Horton re ordered  Ondansetron (Zofran) 8mg one tab by mouth every eight hours as needed for nausea. No changes in wound condition from last visit noted in clinic today.  12/26/2018: F/u abdominal wall fistula drainage, no c/o at this time. Denies any abdominal pain or nausea. Dr. Horton recommends surgery first week of January 2019 for abdominal fistula treatment and possible colostomy placement, patient agree.  01/02/2019: F/u abdominal wall fistula. Dressing with large amount of drainage, malodorous, Dr. Horton is scheduling surgery for third week of January, 2019.  1/16/19: F/U Dr. Horton today in clinic, surgery scheduled for next Friday 1/25/19 with Dr. Horton.     01/23/2019: Presents to wound care clinic for f/u abdominal wound care tx. Surgery scheduled with Dr. Horton for Friday 01/25/2019. Dr. Horton explained Mrs. Morrison surgery procedure including possible complications, Nurse  (Swedish) present, patient verbalizes understanding of procedure including possible complications, all questions from patient were answered by Dr. Horton including blood sugar and blood pressure medications per patient's concerns. Consent for surgery and blood transfusion signed by patient, Dr. Horton and nurse witnessed.  Abdominal wound cultures collected per Dr. Horton, cultures sent to lab/micro pending results. Dr. Horton prescribed the following orders: fleet enema for Thursday 1/24/2019, clear liquid diet and not to eat after midnight all for 1/24/2019. Start taking Neomycin and erythromycin by mouth three times a day (1/24/2019) and dulcolax one tab by mouth twice a day, Mrs. Morrison voices understanding.     01/30/2019: F/U wound care treatment with Dr. Horton. Per pt, primary physician Dr. Pj Monae ordered for her to take potassium pills for three days, last day today and scheduled for lab work at primary physician clinic on 1/31/2019. Per pt. Feeling better, no more abdominal pain (went to ER 1/24/2019 and  "discharged 1/25/2019 c/o abd pain.). Dr. Horton awaiting on primary clearance to re-schedule surgery, per pt. She will call wound care clinic and Dr. Horton to make aware of clearance day. Continue with same orders for dressing change for Dr. Horton. Mrs. Morrison requesting gentamicin refill.     02/06/2019: F/u with Dr. Horton for wound care treatment. Mrs. Morrison brought to clinic a clearance for surgery by Dr. Pj Sanches dated 02/06/2019 "Hyperkalemia-Resolved K 4.8 2/1/2019, labs , Cr. 1.05, H/H 10/30. No contraindication to surgery, tight control of BS, Hydration." A copy of EKG (1/24/2019) and lab work from [facility] collected 1/31/2019, reported results 2/1/2019. Dr. Horton scheduled surgery for 02/22/2019, consent were signed on 01/23/2019, all questions were answered by Dr. Horton, this nurse  (Wolof) present. Education provided to patient on the importance of having a clear liquid diet the day before surgery 02/21/2019 as per Dr. Horton, new medications and preop preparation before surgery, verbalizes understanding. Dr. Horton ordered Norco 5/325 mg PO for pain, Dulcolax two tabs by mouth to take on 02/21/2019, Soap suds enema (SSE) on Thursday 02/21/2019, Golytely by mouth 2 liters on 2/21/2019, Erythromycin 500 mg one tab by mouth three times a day and Neomycin 1 gm by mouth three times a day.     2/13/2019: Presents to wound care clinic for a f/u with Dr. Horton for wound care treatment. No new orders in wound dressing change, reviewed preop orders with Mrs. Morrison per Dr. Horton, all questions answered. Surgery scheduled for 02/22/2019.  2/20/19: Continues wound care a previously ordered.  Depth measured per Dr. Horton 8cm.  Preop orders reviewed with patient who verbalized understanding.  Surgery scheduled for Fri 2/22/19.  Rx given to patient for Norco and Zofran.    03/06/19: Patient admitted to [facility] on 02/22/19 for exploratory laparotomy of " "abdomen per Dr. Horton. Patient discharged on 02/28/19 with home health and PICC line with IV antibiotics. Staples removed today in clinic. Patient denies any fever, chills, n&v; however, she states that she has "low energy." Continued with wound care as ordered.     3/13/2019: F/U with Dr. Horton for wound care treatment. Continue with same wound dressing change orders as per Dr. Horton, orders followed. Home Health to continue wound dressing change and lab draw for CBC weekly; continue IV Ertapenem/Invaz 1 gm IV daily as ordered.     3/20/2019: Clinic visit with Dr. Horton for abdominal abscess treatment. Presents with moderate draining from abdominal wound; wound size decreasing per measurement. Per Dr. Horton, apply one-piece system ostomy bag to abdominal wound for drain collection, may drain bag when it fills and may change every other day, continue applying gentamicin to wound bed before applying ostomy bag. Continue IV Invaz until completion. If ostomy bag not effective for draining collection, may return to previous orders for wound dressing change. Orders followed. Education provided to Mrs. Morrison on handwashing and ostomy care techniques, voices and demonstrates understanding.   3/27/19: Follow up with Dr. Horton today in clinic for abdominal abscess, moderated tanish yellow drainge present on dressing.  Wound slowly improving, patient refused one-piece ostomy bag stated " no it's too messy." requested to return to previous dressing prior to ostomy bag placement- iodoform gauze, aquacel extra, sm abd, and mefix tape.  Silver nitrate x1 per Dr. Horton today in clinic. Rx given to patient for PO Zofran.     04/03/2019: F/u clinic visit with Dr. Horton. Abdominal wound improving in size, picture on file. Wound cultures from abdominal abscess collected and sent to lab/micro, pending results. Mrs. Morrison states going out of the country ([place]) at the end of April and plans to stay there for " approximately two months. Dr. Horton ordered IV antibiotic for two more weeks and new wound care dressing, orders followed.     4/9/2019: Clinic visit with Dr. Horton.  Per Dr. Horton, wound deep measurement increased, draining present. Wound cultures from abdominal wound taken and sent to lab/micro, pending results. C/o abdominal pain and itching around wound, Rx for betamethasone cream 01.% and Norco 5/325mg give in clinic by Dr. Horton. Wound care dressing orders followed. Continue IV antibiotic as previously ordered.  04/17/19: F/u for non-healing wound to abdomen. Culture report negative. Dr. Horton ordered 1 more week of IV antibiotics. Continue with topical wound care as ordered.    4/24/2019: Clinic visit with Dr. Horton for abdominal wound treatment. Wound improvement present, pictures on file. Last IV antibiotic today 4/24/2019, [facility] to discontinue PICC line from left upper arm on 4/25/2019. Per Lyubov Prince, going to [place] on Friday 4/26/2019 and out of the country ([place]) on Monday 4/29/2019. This nurse spoke to Shaunna at [email or web address] (IV antibiotic delivery company) and informed last dose of antibiotic is today. Dr. Horton ordered Hydrocodone-acetaminophen (Norco) 5-325 mg (32 tabs), Bentyl 10 mg (120 capsules) and Gentamycin ointment. Education provided on the importance of eating food that contains iron (to prevent anemia) as well as eating meat and taking her blood pressure medications (blood pressure medication) on time, voices understanding. All questions answered by Dr. Horton. [facility] outpatient confirmed Betamethasone cream ordered on 4/10/2019 is ready for . Instructions and education provided to Mrs. Morrison on abdominal wound dressing changes, hand washing techniques and medication use, effects and side effects, voices and demonstrates understanding. Discharged home on stable conditions, states will give wound care clinic a call when she is back from  [place] in few months from now.        Review of Systems      Objective:        Physical Exam    Vitals:    04/24/19 0846   BP: (!) 171/81   Pulse: 82   Resp: 18   Temp: 97.1 °F (36.2 °C)       Assessment:           ICD-10-CM ICD-9-CM   1. Abdominal wall abscess L02.211 682.2            Incision/Site 02/22/19 1146 Abdomen midline midline (Active)   02/22/19 1146    Present Prior to Hospital Arrival?:    Side:    Location: Abdomen   Orientation: midline   Incision Type: midline   Closure Method: Staples   Additional Comments:    Removal Indication and Assessment:    Wound Outcome:    Removal Indications:    Wound Image    4/24/2019  8:55 AM   Dressing Appearance Intact;Moist drainage 4/24/2019  8:55 AM   Drainage Amount Small 4/24/2019  8:55 AM   Drainage Characteristics/Odor Serosanguineous 4/24/2019  8:55 AM   Appearance Pink;Yellow;Epithelialization 4/24/2019  8:55 AM   Red (%), Wound Tissue Color 90 % 4/24/2019  8:55 AM   Yellow (%), Wound Tissue Color 10 % 4/24/2019  8:55 AM   Periwound Area Intact;Hemosiderin Staining 4/24/2019  8:55 AM   Wound Edges Defined 4/24/2019  8:55 AM   Wound Length (cm) 0.3 cm 4/24/2019  8:55 AM   Wound Width (cm) 0.2 cm 4/24/2019  8:55 AM   Wound Depth (cm) 2 cm 4/24/2019  8:55 AM   Wound Volume (cm^3) 0.12 cm^3 4/24/2019  8:55 AM   Wound Surface Area (cm^2) 0.06 cm^2 4/24/2019  8:55 AM   Care Cleansed with:;Sterile normal saline 4/24/2019  8:55 AM   Dressing Applied 4/24/2019  8:55 AM   Periwound Care Skin barrier film applied 4/24/2019  8:55 AM   Dressing Change Due 04/25/19 4/24/2019  8:55 AM         Abdominal wound   Cleanse wound with:  Normal Saline.  Periwound:  - Cavion or benzoin to periwound; betamethasone periwoind prn itching.  Antimicrobial Ointment/Cream:  Gentamycin to wound bed.  Secondary dressing: Apply iodoform gauze over wound, 4x4 gauze, small abd pad, secure with 2 large Mepore dressing 9 x 20 cm   Do not probe depth of wound      Plan:          Dr. Horton  ordered Hydrocodone-acetaminophen (Norco) 5-325 mg (32 tabs), Bentyl 10 mg (120 capsules) and Gentamycin ointment 4/24/2019.  Follow up with Dr. Horton as per patient availability (states going out of the country on 4/29/2019) for couple of months.  Wound dressing change by patient, Mrs. Morrison, voices and demonstrates understanding.  Discontinue IV antibiotic ertapenem (Invaz) 1 gm IV on 4/24/2019.  [facility] to discontinue PICC line from left upper arm on 4/25/2019. Discontinue weekly CBC lab work and discontinue Home Health on 4/25/2019.

## 2019-07-17 ENCOUNTER — HOSPITAL ENCOUNTER (OUTPATIENT)
Dept: WOUND CARE | Facility: HOSPITAL | Age: 69
Discharge: HOME | End: 2019-07-17
Attending: SURGERY
Payer: MEDICARE

## 2019-07-17 VITALS
HEIGHT: 62 IN | TEMPERATURE: 98 F | BODY MASS INDEX: 30.36 KG/M2 | DIASTOLIC BLOOD PRESSURE: 81 MMHG | WEIGHT: 165 LBS | HEART RATE: 85 BPM | SYSTOLIC BLOOD PRESSURE: 159 MMHG

## 2019-07-17 DIAGNOSIS — I10 ESSENTIAL HYPERTENSION: Primary | ICD-10-CM

## 2019-07-17 DIAGNOSIS — Z79.4 TYPE 2 DIABETES MELLITUS WITH OTHER SKIN ULCER, WITH LONG-TERM CURRENT USE OF INSULIN: ICD-10-CM

## 2019-07-17 DIAGNOSIS — E11.9 TYPE 2 DIABETES MELLITUS WITHOUT COMPLICATION, WITHOUT LONG-TERM CURRENT USE OF INSULIN: ICD-10-CM

## 2019-07-17 DIAGNOSIS — E11.622 TYPE 2 DIABETES MELLITUS WITH OTHER SKIN ULCER, WITH LONG-TERM CURRENT USE OF INSULIN: ICD-10-CM

## 2019-07-17 DIAGNOSIS — K63.2 FISTULA OF INTESTINE, EXCLUDING RECTUM AND ANUS: ICD-10-CM

## 2019-07-17 DIAGNOSIS — K63.2 COLOCUTANEOUS FISTULA: ICD-10-CM

## 2019-07-17 DIAGNOSIS — T14.8XXA DRAINAGE FROM WOUND: ICD-10-CM

## 2019-07-17 DIAGNOSIS — Z98.890 S/P EXPLORATORY LAPAROTOMY: ICD-10-CM

## 2019-07-17 PROCEDURE — 87077 CULTURE AEROBIC IDENTIFY: CPT | Mod: 59

## 2019-07-17 PROCEDURE — 99214 OFFICE O/P EST MOD 30 MIN: CPT

## 2019-07-17 PROCEDURE — 87186 SC STD MICRODIL/AGAR DIL: CPT | Mod: 59

## 2019-07-17 PROCEDURE — 87076 CULTURE ANAEROBE IDENT EACH: CPT

## 2019-07-17 PROCEDURE — 87070 CULTURE OTHR SPECIMN AEROBIC: CPT

## 2019-07-17 PROCEDURE — 87075 CULTR BACTERIA EXCEPT BLOOD: CPT

## 2019-07-17 RX ORDER — ONDANSETRON HYDROCHLORIDE 8 MG/1
8 TABLET, FILM COATED ORAL EVERY 8 HOURS PRN
Qty: 40 TABLET | Refills: 1 | Status: SHIPPED | OUTPATIENT
Start: 2019-07-17 | End: 2019-12-18 | Stop reason: SDUPTHER

## 2019-07-17 RX ORDER — HYDROCODONE BITARTRATE AND ACETAMINOPHEN 5; 325 MG/1; MG/1
TABLET ORAL
Qty: 32 TABLET | Refills: 0 | Status: SHIPPED | OUTPATIENT
Start: 2019-07-17 | End: 2019-08-14 | Stop reason: SDUPTHER

## 2019-07-17 NOTE — PROGRESS NOTES
"       Patient ID: Azucena Morrison is a 69 y.o. female.    Chief Complaint: No chief complaint on file.    6/29/17: Pt re-admit for distal abdominal wound. Pt denies any fevers or chills but states she feels nauseous at times. Returned to [place] June 28, from [place], reports much trouble with abdominal wound while in [place].  7/6/17-- Patient scheduled for surgical drainage of wound Tuesday 7/11/17DB  7/19/17-- Patient post op clinic visit.  8/16/17: CT of abdomen and pelvis done 8/9/17 due to suspected fistula  8/23/17-- U/S of abdomen done today.  12/13/17 Wound vac with 20cc drainage in clinic today.  1/10/18: on PO abx  1/24/18: Fistulagram ordered per Dr. Horton. Patient still on PO abx  02/21/18 Patient is not on antibiotics at this time. BS fasting 135 per patient report this am.  03/07/18 Culture of Anterior Abdominal Wound is positive. No antibiotics at this time.  fasting per patient report.    03/21/18 Patient c/o nausea along with abdominal tenderness near abd midline wound. Patient states that pain level is 6 and that she passed two sero-sanguineous clots from wound. Patient is afebrile this am.  3/28/18: patient continue antibiotics and reports that pain is less than last weeks clinic visit. Drainage has decreased as well  04/04/18 Patient states that abdominal pain is "pressure" sensation and that when she pushes down on her abdomen on either side of abdominal wound she feels like she has to urinate. Patient reports pain level of 3 this am.  fasting this am per patient report.   5/2/18: Patient had Abdomina Toribio today before wound care. She had discontinued Bactrim PO per Dr. Horton's recommendation and culture results and is now taking Amoxicillin PO  6/20/18: Pt reports itching to wound and periwound   6/27/18: patient reports no new complaints with regards to her wound or abdomen, wound continues to decrease in size and drainage  8/1/18: Pt reports increased pain to abdomen with " nausea and emesis since thursday 7/26/18, denies any fevers, patient did no go to ER as instructed by home health nurse on Sat. 7/28/18.  8/29/18: Patient admitted to hospital 8/2 for abdominal wound complications. Surgery for abdominal abscess per Dr. Horton on 8/3. Patient placed on IV antibiotics and discharged home on 8/18 with [facility]  and PICC line to LUE. Patient currently on IV ertapenem. IV medication sent to patients home per [facility].   9/5/18: IV Ertapenem to continue 1 week. Culture sent to lab. [facility]  sent orders. PICC line intact to E.  9/12/18 Antibiotics completed. Culture results pending. 1 deep stitch remaining.  9/12/18: Culture positive for E. Coli, patient to continue Ertapenem IV antibiotics x 2weeks. [facility] notified  9/26/18: F/U abdominal wound, wound continues to improve. Patient will complete IV antibiotics today  10/3/18: patient c/o diarrhea for 2 days and nausea with some vomiting. Labs and stool culture ordered. IV antibiotics discontinued today  10/10/2018 patient will start on IV antibiotic Invaz IV once a day, pending PICC line placement, order placed today  for PICC per Dr. Horton.  10/24/18: patient on IV antibiotics, tolerating well. Continue for 1 week  11/7/2018: IV Antibiotic discontinued.  11/8/2018: PICC line to left upper arm discontinued by Home Health.  11/21/2018 F/u for abdominal wall fistula , c/o nausea  No vomiting , no fever  12/5/2018: F/u for abdominal wall fisula, c/o gas, Dr. Horton will order Bentyl. Surgery schedule for January 2019.  12/12/2018: F/u for abdominal wall fistula, c/o abdominal pain. Dr. Horton ordered Norco 5/325mg tab 1 PO every 4 hours as needed for pain and referred patient for x-ray of abdomen after clinic today.  12/19/2018: F/u for abdominal wall fisula, c/o nausea, Dr. Horton re ordered Ondansetron (Zofran) 8mg one tab by mouth every eight hours as needed for nausea. No changes in wound condition from  last visit noted in clinic today.  12/26/2018: F/u abdominal wall fistula drainage, no c/o at this time. Denies any abdominal pain or nausea. Dr. Horton recommends surgery first week of January 2019 for abdominal fistula treatment and possible colostomy placement, patient agree.  01/02/2019: F/u abdominal wall fistula. Dressing with large amount of drainage, malodorous, Dr. Horton is scheduling surgery for third week of January, 2019.  1/16/19: F/U Dr. Horton today in clinic, surgery scheduled for next Friday 1/25/19 with Dr. Horton.     01/23/2019: Presents to wound care clinic for f/u abdominal wound care tx. Surgery scheduled with Dr. Horton for Friday 01/25/2019. Dr. Horton explained Mrs. Morrison surgery procedure including possible complications, Nurse  (Turkmen) present, patient verbalizes understanding of procedure including possible complications, all questions from patient were answered by Dr. Horton including blood sugar and blood pressure medications per patient's concerns. Consent for surgery and blood transfusion signed by patient, Dr. Horton and nurse witnessed.  Abdominal wound cultures collected per Dr. Horton, cultures sent to lab/micro pending results. Dr. Horton prescribed the following orders: fleet enema for Thursday 1/24/2019, clear liquid diet and not to eat after midnight all for 1/24/2019. Start taking Neomycin and erythromycin by mouth three times a day (1/24/2019) and dulcolax one tab by mouth twice a day, Mrs. Morrison voices understanding.     01/30/2019: F/U wound care treatment with Dr. Horton. Per pt, primary physician Dr. Pj Monae ordered for her to take potassium pills for three days, last day today and scheduled for lab work at primary physician clinic on 1/31/2019. Per pt. Feeling better, no more abdominal pain (went to ER 1/24/2019 and discharged 1/25/2019 c/o abd pain.). Dr. Horton awaiting on primary clearance to re-schedule surgery, per pt. She will  "call wound care clinic and Dr. Horton to make aware of clearance day. Continue with same orders for dressing change for Dr. Horton. Mrs. Morrison requesting gentamicin refill.     02/06/2019: F/u with Dr. Horton for wound care treatment. Mrs. Morrison brought to clinic a clearance for surgery by Dr. Pj Sanches dated 02/06/2019 "Hyperkalemia-Resolved K 4.8 2/1/2019, labs , Cr. 1.05, H/H 10/30. No contraindication to surgery, tight control of BS, Hydration." A copy of EKG (1/24/2019) and lab work from [facility] collected 1/31/2019, reported results 2/1/2019. Dr. Horton scheduled surgery for 02/22/2019, consent were signed on 01/23/2019, all questions were answered by Dr. Horton, this nurse  (Czech) present. Education provided to patient on the importance of having a clear liquid diet the day before surgery 02/21/2019 as per Dr. Horton, new medications and preop preparation before surgery, verbalizes understanding. Dr. Horton ordered Norco 5/325 mg PO for pain, Dulcolax two tabs by mouth to take on 02/21/2019, Soap suds enema (SSE) on Thursday 02/21/2019, Golytely by mouth 2 liters on 2/21/2019, Erythromycin 500 mg one tab by mouth three times a day and Neomycin 1 gm by mouth three times a day.     2/13/2019: Presents to wound care clinic for a f/u with Dr. Horton for wound care treatment. No new orders in wound dressing change, reviewed preop orders with Mrs. Morrison per Dr. Horton, all questions answered. Surgery scheduled for 02/22/2019.  2/20/19: Continues wound care a previously ordered.  Depth measured per Dr. Horton 8cm.  Preop orders reviewed with patient who verbalized understanding.  Surgery scheduled for Fri 2/22/19.  Rx given to patient for Norco and Zofran.    03/06/19: Patient admitted to [facility] on 02/22/19 for exploratory laparotomy of abdomen per Dr. Horton. Patient discharged on 02/28/19 with home health and PICC line with IV antibiotics. Staples removed " "today in clinic. Patient denies any fever, chills, n&v; however, she states that she has "low energy." Continued with wound care as ordered.     3/13/2019: F/U with Dr. Horton for wound care treatment. Continue with same wound dressing change orders as per Dr. Horton, orders followed. Home Health to continue wound dressing change and lab draw for CBC weekly; continue IV Ertapenem/Invaz 1 gm IV daily as ordered.     3/20/2019: Clinic visit with Dr. Horton for abdominal abscess treatment. Presents with moderate draining from abdominal wound; wound size decreasing per measurement. Per Dr. Horton, apply one-piece system ostomy bag to abdominal wound for drain collection, may drain bag when it fills and may change every other day, continue applying gentamicin to wound bed before applying ostomy bag. Continue IV Invaz until completion. If ostomy bag not effective for draining collection, may return to previous orders for wound dressing change. Orders followed. Education provided to Mrs. Morrison on handwashing and ostomy care techniques, voices and demonstrates understanding.   3/27/19: Follow up with Dr. Horton today in clinic for abdominal abscess, moderated tanish yellow drainge present on dressing.  Wound slowly improving, patient refused one-piece ostomy bag stated " no it's too messy." requested to return to previous dressing prior to ostomy bag placement- iodoform gauze, aquacel extra, sm abd, and mefix tape.  Silver nitrate x1 per Dr. Horton today in clinic. Rx given to patient for PO Zofran.     04/03/2019: F/u clinic visit with Dr. Horton. Abdominal wound improving in size, picture on file. Wound cultures from abdominal abscess collected and sent to lab/micro, pending results. Mrs. Morrison states going out of the country ([place]) at the end of April and plans to stay there for approximately two months. Dr. Horton ordered IV antibiotic for two more weeks and new wound care dressing, orders " followed.     4/9/2019: Clinic visit with Dr. Horton.  Per Dr. Horton, wound deep measurement increased, draining present. Wound cultures from abdominal wound taken and sent to lab/micro, pending results. C/o abdominal pain and itching around wound, Rx for betamethasone cream 01.% and Norco 5/325mg give in clinic by Dr. Horton. Wound care dressing orders followed. Continue IV antibiotic as previously ordered.  04/17/19: F/u for non-healing wound to abdomen. Culture report negative. Dr. Horton ordered 1 more week of IV antibiotics. Continue with topical wound care as ordered.    4/24/2019: Clinic visit with Dr. Horton for abdominal wound treatment. Wound improvement present, pictures on file. Last IV antibiotic today 4/24/2019, [facility] to discontinue PICC line from left upper arm on 4/25/2019. Per Lyubov Prince, going to [place] on Friday 4/26/2019 and out of the country ([place]) on Monday 4/29/2019. This nurse spoke to Shaunna at [employer] ([facility] antibiotic delivery company) and informed last dose of antibiotic is today. Dr. Horton ordered Hydrocodone-acetaminophen (Norco) 5-325 mg (32 tabs), Bentyl 10 mg (120 capsules) and Gentamycin ointment. Education provided on the importance of eating food that contains iron (to prevent anemia) as well as eating meat and taking her blood pressure medications (blood pressure medication) on time, voices understanding. All questions answered by Dr. Horton. [facility] outpatient confirmed Betamethasone cream ordered on 4/10/2019 is ready for . Instructions and education provided to Mrs. Morrison on abdominal wound dressing changes, hand washing techniques and medication use, effects and side effects, voices and demonstrates understanding. Discharged home on stable conditions, [facility] will give wound care clinic a call when she is back from [place] in few months from now.    7/17/19: Readmit today to wound care clinic.  Patient was recently out of the  country visiting family in [place].  Patient complains of pain and nausea as on prior visits.  Dr. Horton seen patient today discussed with patient again placing colostomy patient refused.  Culture of wound taken, Dr. Horton restarted patient on zofran po for nausea, and Norco for pain. [facility] restarted today in clinic on Mondays and Fridays and prn. Orders given to gently pack wound with aquacel ag rope, cover with aquacel extra, 4x4 gauze, small abd pad and mefix tape change dressing ever other day by home health and Prn per patient.    Review of Systems   Constitutional: Negative.  Negative for chills and fever.   HENT: Negative.    Eyes: Negative.    Respiratory: Negative.    Cardiovascular: Negative.    Gastrointestinal: Negative.    Genitourinary: Negative.    Musculoskeletal: Negative.    Skin: Positive for color change and wound.   Neurological: Negative.    Psychiatric/Behavioral: Negative.        Objective:      Physical Exam   Constitutional: She is oriented to person, place, and time. She appears well-developed and well-nourished.   HENT:   Head: Normocephalic.   Eyes: Pupils are equal, round, and reactive to light. Conjunctivae and EOM are normal.   Neck: Normal range of motion. Neck supple.   Cardiovascular: Normal rate, regular rhythm, normal heart sounds and intact distal pulses.   Pulmonary/Chest: Effort normal and breath sounds normal.   Abdominal: Soft. Bowel sounds are normal.       Musculoskeletal: Normal range of motion.   Neurological: She is alert and oriented to person, place, and time. She has normal reflexes.   Skin: Skin is warm and dry.       Assessment:       1. Essential hypertension    2. Drainage from wound    3. Fistula of intestine, excluding rectum and anus    4. S/P exploratory laparotomy    5. 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, with long-term current use of insulin           Incision/Site 02/22/19 1146 Abdomen midline midline (Active)   02/22/19  1146    Present Prior to Hospital Arrival?:    Side:    Location: Abdomen   Orientation: midline   Incision Type: midline   Closure Method: Staples   Additional Comments:    Removal Indication and Assessment:    Wound Outcome:    Removal Indications:    Wound Image   7/17/2019  8:56 AM   Dressing Appearance Moist drainage 7/17/2019  8:56 AM   Drainage Amount Moderate 7/17/2019  8:56 AM   Drainage Characteristics/Odor Serosanguineous 7/17/2019  8:56 AM   Appearance Red;Moist 7/17/2019  8:56 AM   Red (%), Wound Tissue Color 100 % 7/17/2019  8:56 AM   Periwound Area Intact 7/17/2019  8:56 AM   Wound Edges Defined 7/17/2019  9:19 AM   Wound Length (cm) 0.4 cm 7/17/2019  8:56 AM   Wound Width (cm) 0.5 cm 7/17/2019  8:56 AM   Wound Depth (cm) 0.6 cm 7/17/2019  8:56 AM   Wound Volume (cm^3) 0.12 cm^3 7/17/2019  8:56 AM   Wound Surface Area (cm^2) 0.2 cm^2 7/17/2019  8:56 AM   Tunneling (depth (cm)/location) 2.2 CM AT 12:00 7/17/2019  8:56 AM   Care Cleansed with:;Sterile normal saline 7/17/2019  8:56 AM   Dressing Applied;Gauze;Calcium alginate;Abd pad 7/17/2019  9:19 AM   Packing other (see comments) 7/17/2019  9:19 AM   Periwound Care Absorptive dressing applied 7/17/2019  9:19 AM   Dressing Change Due 07/19/19 7/17/2019  9:19 AM   Lower midline Abdomen    Medications Used in Clinic:  Lidocaine 2% gel or 4 % Topical solution: PRN  Other:Cavilon to chauncey wound, calmoseptine  Primary dressing: Gently pack wound with Aquacel Ag rope, cover with Aquacel Extra, 4x4 gauze, small ABD pad, secure with mefix tape.  Change dressing every: Monday and Friday per Home Health and prn per patient  Other:RX given to patient for Zofran and Norco today in clinic  Follow-Up 1 week with Dr. Horton 7/24/19    Home Health Orders  See orders above  Home Health Visit Frequency: Mondays, Fridays and PRN.  Readmit Please provide wound care, dressing changes, assessments on Mondays, Fridays and prn.  Home Health to provide patient with supplies  for prn  dressing changes.  Home Health visit prn wound complications  [employer] and Fax number: [facility] [phone]        Subjective:    Plan:                1 week Dr. Horton 7/24/19

## 2019-07-19 LAB
BACTERIA SPEC AEROBE CULT: ABNORMAL
BACTERIA SPEC AEROBE CULT: ABNORMAL

## 2019-07-23 LAB — BACTERIA SPEC ANAEROBE CULT: NORMAL

## 2019-07-24 ENCOUNTER — HOSPITAL ENCOUNTER (OUTPATIENT)
Dept: WOUND CARE | Facility: HOSPITAL | Age: 69
Discharge: HOME | End: 2019-07-24
Attending: SURGERY
Payer: MEDICARE

## 2019-07-24 VITALS
WEIGHT: 165 LBS | DIASTOLIC BLOOD PRESSURE: 76 MMHG | HEART RATE: 80 BPM | TEMPERATURE: 98 F | BODY MASS INDEX: 30.36 KG/M2 | HEIGHT: 62 IN | SYSTOLIC BLOOD PRESSURE: 139 MMHG

## 2019-07-24 DIAGNOSIS — I10 ESSENTIAL HYPERTENSION: ICD-10-CM

## 2019-07-24 DIAGNOSIS — K63.2 FISTULA OF INTESTINE, EXCLUDING RECTUM AND ANUS: ICD-10-CM

## 2019-07-24 DIAGNOSIS — R10.32 LLQ PAIN: ICD-10-CM

## 2019-07-24 DIAGNOSIS — Z16.12 INFECTION DUE TO ESBL-PRODUCING ESCHERICHIA COLI: ICD-10-CM

## 2019-07-24 DIAGNOSIS — Z93.3 STATUS POST HARTMANN'S PROCEDURE: ICD-10-CM

## 2019-07-24 DIAGNOSIS — Z90.49 HISTORY OF HEMICOLECTOMY: ICD-10-CM

## 2019-07-24 DIAGNOSIS — E11.9 TYPE 2 DIABETES MELLITUS WITHOUT COMPLICATION, WITHOUT LONG-TERM CURRENT USE OF INSULIN: ICD-10-CM

## 2019-07-24 DIAGNOSIS — L02.211 ABSCESS OF ABDOMINAL WALL: Primary | ICD-10-CM

## 2019-07-24 DIAGNOSIS — E11.628 DIABETES WITH SKIN COMPLICATION: ICD-10-CM

## 2019-07-24 DIAGNOSIS — Z98.890 S/P EXPLORATORY LAPAROTOMY: ICD-10-CM

## 2019-07-24 DIAGNOSIS — A49.8 INFECTION DUE TO ESBL-PRODUCING ESCHERICHIA COLI: ICD-10-CM

## 2019-07-24 DIAGNOSIS — K63.2 COLOCUTANEOUS FISTULA: ICD-10-CM

## 2019-07-24 PROCEDURE — 99212 OFFICE O/P EST SF 10 MIN: CPT

## 2019-07-24 RX ORDER — ONDANSETRON HYDROCHLORIDE 8 MG/1
8 TABLET, FILM COATED ORAL EVERY 8 HOURS
Qty: 40 TABLET | Refills: 0 | Status: ON HOLD | OUTPATIENT
Start: 2019-07-24 | End: 2019-12-24 | Stop reason: HOSPADM

## 2019-07-31 ENCOUNTER — HOSPITAL ENCOUNTER (OUTPATIENT)
Dept: WOUND CARE | Facility: HOSPITAL | Age: 69
Discharge: HOME | End: 2019-07-31
Attending: SURGERY
Payer: MEDICARE

## 2019-07-31 VITALS
HEART RATE: 83 BPM | WEIGHT: 165 LBS | HEIGHT: 62 IN | SYSTOLIC BLOOD PRESSURE: 158 MMHG | DIASTOLIC BLOOD PRESSURE: 82 MMHG | TEMPERATURE: 98 F | BODY MASS INDEX: 30.36 KG/M2

## 2019-07-31 DIAGNOSIS — E11.9 TYPE 2 DIABETES MELLITUS WITHOUT COMPLICATION, WITHOUT LONG-TERM CURRENT USE OF INSULIN: ICD-10-CM

## 2019-07-31 DIAGNOSIS — Z98.890 S/P EXPLORATORY LAPAROTOMY: ICD-10-CM

## 2019-07-31 DIAGNOSIS — I10 ESSENTIAL HYPERTENSION: ICD-10-CM

## 2019-07-31 DIAGNOSIS — K63.2 FISTULA OF INTESTINE, EXCLUDING RECTUM AND ANUS: ICD-10-CM

## 2019-07-31 DIAGNOSIS — K63.2 COLOCUTANEOUS FISTULA: ICD-10-CM

## 2019-07-31 DIAGNOSIS — L02.211 ABSCESS OF ABDOMINAL WALL: Primary | ICD-10-CM

## 2019-07-31 DIAGNOSIS — Z90.49 HISTORY OF HEMICOLECTOMY: ICD-10-CM

## 2019-07-31 DIAGNOSIS — Z93.3 STATUS POST HARTMANN'S PROCEDURE: ICD-10-CM

## 2019-07-31 DIAGNOSIS — T81.89XD SUTURE GRANULOMA, SUBSEQUENT ENCOUNTER: ICD-10-CM

## 2019-07-31 PROCEDURE — 99212 OFFICE O/P EST SF 10 MIN: CPT

## 2019-07-31 RX ORDER — AMOXICILLIN AND CLAVULANATE POTASSIUM 875; 125 MG/1; MG/1
1 TABLET, FILM COATED ORAL 2 TIMES DAILY
Qty: 60 TABLET | Refills: 1 | Status: SHIPPED | OUTPATIENT
Start: 2019-07-31 | End: 2019-09-11 | Stop reason: SDUPTHER

## 2019-07-31 RX ORDER — GENTAMICIN SULFATE 1 MG/G
OINTMENT TOPICAL
Qty: 30 G | Refills: 0 | Status: SHIPPED | OUTPATIENT
Start: 2019-07-31 | End: 2019-10-23 | Stop reason: SDUPTHER

## 2019-07-31 RX ORDER — DICYCLOMINE HYDROCHLORIDE 10 MG/1
CAPSULE ORAL
Qty: 120 CAPSULE | Refills: 0 | Status: SHIPPED | OUTPATIENT
Start: 2019-07-31 | End: 2019-10-09 | Stop reason: SDUPTHER

## 2019-07-31 RX ORDER — TRAMADOL HYDROCHLORIDE 50 MG/1
TABLET ORAL
Qty: 24 TABLET | Refills: 0 | Status: SHIPPED | OUTPATIENT
Start: 2019-07-31 | End: 2019-09-11 | Stop reason: SDUPTHER

## 2019-07-31 NOTE — PROGRESS NOTES
"[facility]                Progress Note    Subjective:       Patient ID: Azucena Morrison is a 69 y.o. female.    Chief Complaint: Non-healing Wound    6/29/17: Pt re-admit for distal abdominal wound. Pt denies any fevers or chills but states she feels nauseous at times. Returned to [place] June 28, from [place], reports much trouble with abdominal wound while in [place].  7/6/17-- Patient scheduled for surgical drainage of wound Tuesday 7/11/17DB  7/19/17-- Patient post op clinic visit.  8/16/17: CT of abdomen and pelvis done 8/9/17 due to suspected fistula  8/23/17-- U/S of abdomen done today.  12/13/17 Wound vac with 20cc drainage in clinic today.  1/10/18: on PO abx  1/24/18: Fistulagram ordered per Dr. Horton. Patient still on PO abx  02/21/18 Patient is not on antibiotics at this time. BS fasting 135 per patient report this am.  03/07/18 Culture of Anterior Abdominal Wound is positive. No antibiotics at this time.  fasting per patient report.    03/21/18 Patient c/o nausea along with abdominal tenderness near abd midline wound. Patient states that pain level is 6 and that she passed two sero-sanguineous clots from wound. Patient is afebrile this am.  3/28/18: patient continue antibiotics and reports that pain is less than last weeks clinic visit. Drainage has decreased as well  04/04/18 Patient states that abdominal pain is "pressure" sensation and that when she pushes down on her abdomen on either side of abdominal wound she feels like she has to urinate. Patient reports pain level of 3 this am.  fasting this am per patient report.   5/2/18: Patient had Abdomina Toribio today before wound care. She had discontinued Bactrim PO per Dr. Horton's recommendation and culture results and is now taking Amoxicillin PO  6/20/18: Pt reports itching to wound and periwound   6/27/18: patient reports no new complaints with regards to her wound or abdomen, wound " continues to decrease in size and drainage  8/1/18: Pt reports increased pain to abdomen with nausea and emesis since thursday 7/26/18, denies any fevers, patient did no go to ER as instructed by home health nurse on Sat. 7/28/18.  8/29/18: Patient admitted to hospital 8/2 for abdominal wound complications. Surgery for abdominal abscess per Dr. Horton on 8/3. Patient placed on IV antibiotics and discharged home on 8/18 with [facility]  and PICC line to [facility]. Patient currently on IV ertapenem. IV medication sent to patients home per [facility].   9/5/18: IV Ertapenem to continue 1 week. Culture sent to lab. [facility]  sent orders. PICC line intact to [facility].  9/12/18 Antibiotics completed. Culture results pending. 1 deep stitch remaining.  9/12/18: Culture positive for E. Coli, patient to continue Ertapenem IV antibiotics x 2weeks. [facility] notified  9/26/18: F/U abdominal wound, wound continues to improve. Patient will complete IV antibiotics today  10/3/18: patient c/o diarrhea for 2 days and nausea with some vomiting. Labs and stool culture ordered. IV antibiotics discontinued today  10/10/2018 patient will start on IV antibiotic Invaz IV once a day, pending PICC line placement, order placed today  for PICC per Dr. Horton.  10/24/18: patient on IV antibiotics, tolerating well. Continue for 1 week  11/7/2018: IV Antibiotic discontinued.  11/8/2018: PICC line to left upper arm discontinued by Home Health.  11/21/2018 F/u for abdominal wall fistula , c/o nausea  No vomiting , no fever  12/5/2018: F/u for abdominal wall fisula, c/o gas, Dr. Horton will order Bentyl. Surgery schedule for January 2019.  12/12/2018: F/u for abdominal wall fistula, c/o abdominal pain. Dr. Horton ordered Norco 5/325mg tab 1 PO every 4 hours as needed for pain and referred patient for x-ray of abdomen after clinic today.  12/19/2018: F/u for abdominal wall fisula, c/o nausea, Dr. Horton re ordered Ondansetron (Zofran) 8mg  one tab by mouth every eight hours as needed for nausea. No changes in wound condition from last visit noted in clinic today.  12/26/2018: F/u abdominal wall fistula drainage, no c/o at this time. Denies any abdominal pain or nausea. Dr. Horton recommends surgery first week of January 2019 for abdominal fistula treatment and possible colostomy placement, patient agree.  01/02/2019: F/u abdominal wall fistula. Dressing with large amount of drainage, malodorous, Dr. Horton is scheduling surgery for third week of January, 2019.  1/16/19: F/U Dr. Horton today in clinic, surgery scheduled for next Friday 1/25/19 with Dr. Horton.     01/23/2019: Presents to wound care clinic for f/u abdominal wound care tx. Surgery scheduled with Dr. Horton for Friday 01/25/2019. Dr. Horton explained Mrs. Morrison surgery procedure including possible complications, Nurse  (Greek) present, patient verbalizes understanding of procedure including possible complications, all questions from patient were answered by Dr. Horton including blood sugar and blood pressure medications per patient's concerns. Consent for surgery and blood transfusion signed by patient, Dr. Horton and nurse witnessed.  Abdominal wound cultures collected per Dr. Horton, cultures sent to lab/micro pending results. Dr. Horton prescribed the following orders: fleet enema for Thursday 1/24/2019, clear liquid diet and not to eat after midnight all for 1/24/2019. Start taking Neomycin and erythromycin by mouth three times a day (1/24/2019) and dulcolax one tab by mouth twice a day, Mrs. Morrison voices understanding.     01/30/2019: F/U wound care treatment with Dr. Horton. Per pt, primary physician Dr. Pj Monae ordered for her to take potassium pills for three days, last day today and scheduled for lab work at primary physician clinic on 1/31/2019. Per pt. Feeling better, no more abdominal pain (went to ER 1/24/2019 and discharged 1/25/2019 c/o abd  "pain.). Dr. Horton awaiting on primary clearance to re-schedule surgery, per pt. She will call wound care clinic and Dr. Horton to make aware of clearance day. Continue with same orders for dressing change for Dr. Horton. Mrs. Morrison requesting gentamicin refill.     02/06/2019: F/u with Dr. Horton for wound care treatment. Mrs. Morrison brought to clinic a clearance for surgery by Dr. Pj Sanches dated 02/06/2019 "Hyperkalemia-Resolved K 4.8 2/1/2019, labs , Cr. 1.05, H/H 10/30. No contraindication to surgery, tight control of BS, Hydration." A copy of EKG (1/24/2019) and lab work from [facility] collected 1/31/2019, reported results 2/1/2019. Dr. Horton scheduled surgery for 02/22/2019, consent were signed on 01/23/2019, all questions were answered by Dr. Horton, this nurse  (Malawian) present. Education provided to patient on the importance of having a clear liquid diet the day before surgery 02/21/2019 as per Dr. Horton, new medications and preop preparation before surgery, verbalizes understanding. Dr. Horton ordered Norco 5/325 mg PO for pain, Dulcolax two tabs by mouth to take on 02/21/2019, Soap suds enema (SSE) on Thursday 02/21/2019, Golytely by mouth 2 liters on 2/21/2019, Erythromycin 500 mg one tab by mouth three times a day and Neomycin 1 gm by mouth three times a day.     2/13/2019: Presents to wound care clinic for a f/u with Dr. Horton for wound care treatment. No new orders in wound dressing change, reviewed preop orders with Mrs. Morrison per Dr. Horton, all questions answered. Surgery scheduled for 02/22/2019.  2/20/19: Continues wound care a previously ordered.  Depth measured per Dr. Horton 8cm.  Preop orders reviewed with patient who verbalized understanding.  Surgery scheduled for Fri 2/22/19.  Rx given to patient for Norco and Zofran.    03/06/19: Patient admitted to [facility] on 02/22/19 for exploratory laparotomy of abdomen per Dr. Horton. Patient " "discharged on 02/28/19 with home health and PICC line with IV antibiotics. Staples removed today in clinic. Patient denies any fever, chills, n&v; however, she states that she has "low energy." Continued with wound care as ordered.     3/13/2019: F/U with Dr. Horton for wound care treatment. Continue with same wound dressing change orders as per Dr. Horton, orders followed. Home Health to continue wound dressing change and lab draw for CBC weekly; continue IV Ertapenem/Invaz 1 gm IV daily as ordered.     3/20/2019: Clinic visit with Dr. Horton for abdominal abscess treatment. Presents with moderate draining from abdominal wound; wound size decreasing per measurement. Per Dr. Horton, apply one-piece system ostomy bag to abdominal wound for drain collection, may drain bag when it fills and may change every other day, continue applying gentamicin to wound bed before applying ostomy bag. Continue IV Invaz until completion. If ostomy bag not effective for draining collection, may return to previous orders for wound dressing change. Orders followed. Education provided to Mrs. Morrison on handwashing and ostomy care techniques, voices and demonstrates understanding.   3/27/19: Follow up with Dr. Horton today in clinic for abdominal abscess, moderated tanish yellow drainge present on dressing.  Wound slowly improving, patient refused one-piece ostomy bag stated " no it's too messy." requested to return to previous dressing prior to ostomy bag placement- iodoform gauze, aquacel extra, sm abd, and mefix tape.  Silver nitrate x1 per Dr. Horton today in clinic. Rx given to patient for PO Zofran.     04/03/2019: F/u clinic visit with Dr. Horton. Abdominal wound improving in size, picture on file. Wound cultures from abdominal abscess collected and sent to lab/micro, pending results. Mrs. Morrison states going out of the country ([place]) at the end of April and plans to stay there for approximately two months. Dr." Clifford ordered IV antibiotic for two more weeks and new wound care dressing, orders followed.     4/9/2019: Clinic visit with Dr. Horton.  Per Dr. Horton, wound deep measurement increased, draining present. Wound cultures from abdominal wound taken and sent to lab/micro, pending results. C/o abdominal pain and itching around wound, Rx for betamethasone cream 01.% and Norco 5/325mg give in clinic by Dr. Horton. Wound care dressing orders followed. Continue IV antibiotic as previously ordered.  04/17/19: F/u for non-healing wound to abdomen. Culture report negative. Dr. Horton ordered 1 more week of IV antibiotics. Continue with topical wound care as ordered.     4/24/2019: Clinic visit with Dr. Horton for abdominal wound treatment. Wound improvement present, pictures on file. Last IV antibiotic today 4/24/2019, [facility] to discontinue PICC line from left upper arm on 4/25/2019. Per Lyubov Prince, going to [place] on Friday 4/26/2019 and out of the country ([place]) on Monday 4/29/2019. This nurse spoke to Shaunna at [employer] [employer] (IV antibiotic delivery company) and informed last dose of antibiotic is today. Dr. Horton ordered Hydrocodone-acetaminophen (Norco) 5-325 mg (32 tabs), Bentyl 10 mg (120 capsules) and Gentamycin ointment. Education provided on the importance of eating food that contains iron (to prevent anemia) as well as eating meat and taking her blood pressure medications (blood pressure medication) on time, voices understanding. All questions answered by Dr. Horton. [facility] outpatient confirmed Betamethasone cream ordered on 4/10/2019 is ready for . Instructions and education provided to Mrs. Morrison on abdominal wound dressing changes, hand washing techniques and medication use, effects and side effects, voices and demonstrates understanding. Discharged home on stable conditions, [facility] will give wound care clinic a call when she is back from [place] in few months from  now.     7/17/19: Readmit today to wound care clinic.  Patient was recently out of the country visiting family in [place].  Patient complains of pain and nausea as on prior visits.  Dr. Horton seen patient today discussed with patient again placing colostomy patient refused.  Culture of wound taken, Dr. Horton restarted patient on zofran po for nausea, and Norco for pain. [facility] restarted today in clinic on Mondays and Fridays and prn. Orders given to gently pack wound with aquacel ag rope, cover with aquacel extra, 4x4 gauze, small abd pad and mefix tape change dressing ever other day by [facility] and Prn per patient.     7/24/2019: Clinic visit with Dr. Horton. Continue with dressing change as ordered. Wound cultures reviewed with patient, lab work ordered by Dr. Horton, no fever present or reported at this time. C/o decrease appetite, medication periactin prescribed. Requesting needles for insulin pen, order prescribed by Dr. Horton.      7/31/2019: F/U clinic visit with Dr. Horton. C/o of excruciated back pain 10/10, not feeling well, lot of gas and left upper quadrant discomfort. New orders prescribed: Augmentin 875-125 mg by mouth twice a day. Bentyl 10 mg one tab by mouth 4 times a day. Tramadol 50 mg one tab by mouth every 6 hours as needed for pain. Abdomen ultrasound. Wound care dressing completed as ordered.      Review of Systems   Constitutional: Negative.    HENT: Negative.    Eyes: Negative.    Respiratory: Negative.    Cardiovascular: Negative.    Gastrointestinal: Negative.    Genitourinary: Negative.    Musculoskeletal: Negative.    Skin: Negative.    Neurological: Negative.    Psychiatric/Behavioral: Negative.          Objective:        Physical Exam   Constitutional: She is oriented to person, place, and time. She appears well-developed and well-nourished.   HENT:   Head: Normocephalic.   Eyes: Pupils are equal, round, and reactive to light. Conjunctivae and EOM are normal.    Neck: Normal range of motion. Neck supple.   Cardiovascular: Normal rate, regular rhythm, normal heart sounds and intact distal pulses.   Pulmonary/Chest: Effort normal and breath sounds normal.   Abdominal: Soft. Bowel sounds are normal.   Musculoskeletal: Normal range of motion.   Neurological: She is alert and oriented to person, place, and time. She has normal reflexes.   Skin: Skin is warm and dry.       Vitals:    07/31/19 0828   BP: (!) 158/82   Pulse: 83   Temp: 98.4 °F (36.9 °C)       Assessment:           ICD-10-CM ICD-9-CM   1. Abscess of abdominal wall L02.211 682.2            Incision/Site 02/22/19 1146 Abdomen midline midline (Active)   02/22/19 1146    Present Prior to Hospital Arrival?:    Side:    Location: Abdomen   Orientation: midline   Incision Type: midline   Closure Method: Staples   Additional Comments:    Removal Indication and Assessment:    Wound Outcome:    Removal Indications:    Dressing Appearance Moist drainage 7/31/2019  8:00 AM   Drainage Amount Moderate 7/31/2019  8:00 AM   Drainage Characteristics/Odor Serosanguineous 7/31/2019  8:00 AM   Appearance Red;Moist 7/31/2019  8:00 AM   Red (%), Wound Tissue Color 100 % 7/31/2019  8:00 AM   Periwound Area Intact 7/31/2019  8:00 AM   Wound Edges Defined 7/31/2019  8:00 AM   Wound Length (cm) 0.4 cm 7/31/2019  8:00 AM   Wound Width (cm) 0.5 cm 7/31/2019  8:00 AM   Wound Depth (cm) 0.6 cm 7/31/2019  8:00 AM   Wound Volume (cm^3) 0.12 cm^3 7/31/2019  8:00 AM   Wound Surface Area (cm^2) 0.2 cm^2 7/31/2019  8:00 AM   Tunneling (depth (cm)/location) 2.2 cm at 1200 7/31/2019  8:00 AM   Care Cleansed with:;Sterile normal saline 7/31/2019  8:00 AM   Dressing Applied;Calcium alginate;Gauze 7/31/2019  8:00 AM   Packing other (see comments) 7/31/2019  8:00 AM   Periwound Care Absorptive dressing applied 7/31/2019  8:00 AM   Dressing Change Due 08/02/19 7/31/2019  8:00 AM         Medications Used in Clinic:  Lidocaine 2% gel or 4 % Topical  solution: PRN  Other: Cavilon to chauncey wound, calmoseptine  Primary dressing: Apply gentamycin to wound bed. Gently pack wound with Aquacel Ag rope, cover with Aquacel Extra, 4x4 gauze, small ABD pad, secure with mefix tape.    Plan:          Follow up in about 1 week (around 8/7/2019).  Rx. For Tramadol 50 mg one tab by mouth every 6 hours as needed for pain.  Augmentin 875-125 mg one tab by mouth twice a day.  Bentyl 10 mg one tab by mouth 4 times a day.  Abdomen ultrasound.

## 2019-08-02 ENCOUNTER — EXTERNAL HOME HEALTH (OUTPATIENT)
Dept: HOME HEALTH SERVICES | Facility: HOSPITAL | Age: 69
End: 2019-08-02

## 2019-08-07 ENCOUNTER — HOSPITAL ENCOUNTER (OUTPATIENT)
Dept: WOUND CARE | Facility: HOSPITAL | Age: 69
Discharge: HOME | End: 2019-08-07
Attending: SURGERY
Payer: MEDICARE

## 2019-08-07 VITALS
HEART RATE: 73 BPM | TEMPERATURE: 98 F | SYSTOLIC BLOOD PRESSURE: 151 MMHG | DIASTOLIC BLOOD PRESSURE: 81 MMHG | BODY MASS INDEX: 30.36 KG/M2 | WEIGHT: 165 LBS | HEIGHT: 62 IN

## 2019-08-07 DIAGNOSIS — L02.211 ABDOMINAL WALL ABSCESS: ICD-10-CM

## 2019-08-07 DIAGNOSIS — E11.628 DIABETES WITH SKIN COMPLICATION: ICD-10-CM

## 2019-08-07 DIAGNOSIS — L02.211 ABSCESS OF ABDOMINAL WALL: Primary | ICD-10-CM

## 2019-08-07 DIAGNOSIS — K63.2 COLOCUTANEOUS FISTULA: ICD-10-CM

## 2019-08-07 DIAGNOSIS — R10.32 LLQ PAIN: ICD-10-CM

## 2019-08-07 PROCEDURE — 99212 OFFICE O/P EST SF 10 MIN: CPT

## 2019-08-07 NOTE — PROGRESS NOTES
"[facility]                Progress Note    Subjective:       Patient ID: Azucena Morrison is a 69 y.o. female.    Chief Complaint: Non-healing Wound    6/29/17: Pt re-admit for distal abdominal wound. Pt denies any fevers or chills but states she feels nauseous at times. Returned to [place] June 28, from [place], reports much trouble with abdominal wound while in [place].  7/6/17-- Patient scheduled for surgical drainage of wound Tuesday 7/11/17DB  7/19/17-- Patient post op clinic visit.  8/16/17: CT of abdomen and pelvis done 8/9/17 due to suspected fistula  8/23/17-- U/S of abdomen done today.  12/13/17 Wound vac with 20cc drainage in clinic today.  1/10/18: on PO abx  1/24/18: Fistulagram ordered per Dr. Horton. Patient still on PO abx  02/21/18 Patient is not on antibiotics at this time. BS fasting 135 per patient report this am.  03/07/18 Culture of Anterior Abdominal Wound is positive. No antibiotics at this time.  fasting per patient report.    03/21/18 Patient c/o nausea along with abdominal tenderness near abd midline wound. Patient states that pain level is 6 and that she passed two sero-sanguineous clots from wound. Patient is afebrile this am.  3/28/18: patient continue antibiotics and reports that pain is less than last weeks clinic visit. Drainage has decreased as well  04/04/18 Patient states that abdominal pain is "pressure" sensation and that when she pushes down on her abdomen on either side of abdominal wound she feels like she has to urinate. Patient reports pain level of 3 this am.  fasting this am per patient report.   5/2/18: Patient had Abdomina Toribio today before wound care. She had discontinued Bactrim PO per Dr. Horton's recommendation and culture results and is now taking Amoxicillin PO  6/20/18: Pt reports itching to wound and periwound   6/27/18: patient reports no new complaints with regards to her wound or abdomen, wound " continues to decrease in size and drainage  8/1/18: Pt reports increased pain to abdomen with nausea and emesis since thursday 7/26/18, denies any fevers, patient did no go to ER as instructed by home health nurse on Sat. 7/28/18.  8/29/18: Patient admitted to hospital 8/2 for abdominal wound complications. Surgery for abdominal abscess per Dr. Horton on 8/3. Patient placed on IV antibiotics and discharged home on 8/18 with [facility]  and PICC line to [facility]. Patient currently on IV ertapenem. IV medication sent to patients home per [facility].   9/5/18: IV Ertapenem to continue 1 week. Culture sent to lab. [facility]  sent orders. PICC line intact to [facility].  9/12/18 Antibiotics completed. Culture results pending. 1 deep stitch remaining.  9/12/18: Culture positive for E. Coli, patient to continue Ertapenem IV antibiotics x 2weeks. [facility] notified  9/26/18: F/U abdominal wound, wound continues to improve. Patient will complete IV antibiotics today  10/3/18: patient c/o diarrhea for 2 days and nausea with some vomiting. Labs and stool culture ordered. IV antibiotics discontinued today  10/10/2018 patient will start on IV antibiotic Invaz IV once a day, pending PICC line placement, order placed today  for PICC per Dr. Horton.  10/24/18: patient on IV antibiotics, tolerating well. Continue for 1 week  11/7/2018: IV Antibiotic discontinued.  11/8/2018: PICC line to left upper arm discontinued by Home Health.  11/21/2018 F/u for abdominal wall fistula , c/o nausea  No vomiting , no fever  12/5/2018: F/u for abdominal wall fisula, c/o gas, Dr. Horton will order Bentyl. Surgery schedule for January 2019.  12/12/2018: F/u for abdominal wall fistula, c/o abdominal pain. Dr. Horton ordered Norco 5/325mg tab 1 PO every 4 hours as needed for pain and referred patient for x-ray of abdomen after clinic today.  12/19/2018: F/u for abdominal wall fisula, c/o nausea, Dr. Horton re ordered Ondansetron (Zofran) 8mg  one tab by mouth every eight hours as needed for nausea. No changes in wound condition from last visit noted in clinic today.  12/26/2018: F/u abdominal wall fistula drainage, no c/o at this time. Denies any abdominal pain or nausea. Dr. Horton recommends surgery first week of January 2019 for abdominal fistula treatment and possible colostomy placement, patient agree.  01/02/2019: F/u abdominal wall fistula. Dressing with large amount of drainage, malodorous, Dr. Horton is scheduling surgery for third week of January, 2019.  1/16/19: F/U Dr. Horton today in clinic, surgery scheduled for next Friday 1/25/19 with Dr. Horton.     01/23/2019: Presents to wound care clinic for f/u abdominal wound care tx. Surgery scheduled with Dr. Horton for Friday 01/25/2019. Dr. Horton explained Mrs. Morrison surgery procedure including possible complications, Nurse  ([place]) present, patient verbalizes understanding of procedure including possible complications, all questions from patient were answered by Dr. Horton including blood sugar and blood pressure medications per patient's concerns. Consent for surgery and blood transfusion signed by patient, Dr. Horton and nurse witnessed.  Abdominal wound cultures collected per Dr. Horton, cultures sent to lab/micro pending results. Dr. Horton prescribed the following orders: fleet enema for Thursday 1/24/2019, clear liquid diet and not to eat after midnight all for 1/24/2019. Start taking Neomycin and erythromycin by mouth three times a day (1/24/2019) and dulcolax one tab by mouth twice a day, Mrs. Morrison voices understanding.     01/30/2019: F/U wound care treatment with Dr. Horton. Per pt, primary physician Dr. Pj Monae ordered for her to take potassium pills for three days, last day today and scheduled for lab work at primary physician clinic on 1/31/2019. Per pt. Feeling better, no more abdominal pain (went to ER 1/24/2019 and discharged 1/25/2019 c/o abd  "pain.). Dr. Horton awaiting on primary clearance to re-schedule surgery, per pt. She will call wound care clinic and Dr. Horton to make aware of clearance day. Continue with same orders for dressing change for Dr. Horton. Mrs. Morrison requesting gentamicin refill.     02/06/2019: F/u with Dr. Horton for wound care treatment. Mrs. Morrison brought to clinic a clearance for surgery by Dr. Pj Sanches dated 02/06/2019 "Hyperkalemia-Resolved K 4.8 2/1/2019, labs , Cr. 1.05, H/H 10/30. No contraindication to surgery, tight control of BS, Hydration." A copy of EKG (1/24/2019) and lab work from [facility] collected 1/31/2019, reported results 2/1/2019. Dr. Horton scheduled surgery for 02/22/2019, consent were signed on 01/23/2019, all questions were answered by Dr. Horton, this nurse  ([place]) present. Education provided to patient on the importance of having a clear liquid diet the day before surgery 02/21/2019 as per Dr. Horton, new medications and preop preparation before surgery, verbalizes understanding. Dr. Horton ordered Norco 5/325 mg PO for pain, Dulcolax two tabs by mouth to take on 02/21/2019, Soap suds enema (SSE) on Thursday 02/21/2019, Golytely by mouth 2 liters on 2/21/2019, Erythromycin 500 mg one tab by mouth three times a day and Neomycin 1 gm by mouth three times a day.     2/13/2019: Presents to wound care clinic for a f/u with Dr. Horton for wound care treatment. No new orders in wound dressing change, reviewed preop orders with Mrs. Morrison per Dr. Horton, all questions answered. Surgery scheduled for 02/22/2019.  2/20/19: Continues wound care a previously ordered.  Depth measured per Dr. Horton 8cm.  Preop orders reviewed with patient who verbalized understanding.  Surgery scheduled for Fri 2/22/19.  Rx given to patient for Norco and Zofran.    03/06/19: Patient admitted to [facility] on 02/22/19 for exploratory laparotomy of abdomen per Dr. Horton. Patient " "discharged on 02/28/19 with home health and PICC line with IV antibiotics. Staples removed today in clinic. Patient denies any fever, chills, n&v; however, she states that she has "low energy." Continued with wound care as ordered.     3/13/2019: F/U with Dr. Horton for wound care treatment. Continue with same wound dressing change orders as per Dr. Horton, orders followed. Home Health to continue wound dressing change and lab draw for CBC weekly; continue IV Ertapenem/Invaz 1 gm IV daily as ordered.     3/20/2019: Clinic visit with Dr. Horton for abdominal abscess treatment. Presents with moderate draining from abdominal wound; wound size decreasing per measurement. Per Dr. Horton, apply one-piece system ostomy bag to abdominal wound for drain collection, may drain bag when it fills and may change every other day, continue applying gentamicin to wound bed before applying ostomy bag. Continue IV Invaz until completion. If ostomy bag not effective for draining collection, may return to previous orders for wound dressing change. Orders followed. Education provided to Mrs. Morrison on handwashing and ostomy care techniques, voices and demonstrates understanding.   3/27/19: Follow up with Dr. Horton today in clinic for abdominal abscess, moderated tanish yellow drainge present on dressing.  Wound slowly improving, patient refused one-piece ostomy bag stated " no it's too messy." requested to return to previous dressing prior to ostomy bag placement- iodoform gauze, aquacel extra, sm abd, and mefix tape.  Silver nitrate x1 per Dr. Horton today in clinic. Rx given to patient for PO Zofran.     04/03/2019: F/u clinic visit with Dr. Horton. Abdominal wound improving in size, picture on file. Wound cultures from abdominal abscess collected and sent to lab/micro, pending results. Mrs. Morrison states going out of the country ([employer]) at the end of April and plans to stay there for approximately two months. Dr." Clifford ordered IV antibiotic for two more weeks and new wound care dressing, orders followed.     4/9/2019: Clinic visit with Dr. Horton.  Per Dr. Horton, wound deep measurement increased, draining present. Wound cultures from abdominal wound taken and sent to lab/micro, pending results. C/o abdominal pain and itching around wound, Rx for betamethasone cream 01.% and Norco 5/325mg give in clinic by Dr. Horton. Wound care dressing orders followed. Continue IV antibiotic as previously ordered.  04/17/19: F/u for non-healing wound to abdomen. Culture report negative. Dr. Horton ordered 1 more week of IV antibiotics. Continue with topical wound care as ordered.     4/24/2019: Clinic visit with Dr. Horton for abdominal wound treatment. Wound improvement present, pictures on file. Last IV antibiotic today 4/24/2019, [facility] to discontinue PICC line from left upper arm on 4/25/2019. Per Lyubov Prince, going to [place] on Friday 4/26/2019 and out of the country ([place]) on Monday 4/29/2019. This nurse spoke to Shaunna at [employer] (IV antibiotic delivery company) and informed last dose of antibiotic is today. Dr. Horton ordered Hydrocodone-acetaminophen (Norco) 5-325 mg (32 tabs), Bentyl 10 mg (120 capsules) and Gentamycin ointment. Education provided on the importance of eating food that contains iron (to prevent anemia) as well as eating meat and taking her blood pressure medications (blood pressure medication) on time, voices understanding. All questions answered by Dr. Horton. [facility] outpatient confirmed Betamethasone cream ordered on 4/10/2019 is ready for . Instructions and education provided to Mrs. Morrison on abdominal wound dressing changes, hand washing techniques and medication use, effects and side effects, voices and demonstrates understanding. Discharged home on stable conditions, [facility] will give wound care clinic a call when she is back from [place] in few months from  now.     7/17/19: Readmit today to wound care clinic.  Patient was recently out of the country visiting family in [place].  Patient complains of pain and nausea as on prior visits.  Dr. Horton seen patient today discussed with patient again placing colostomy patient refused.  Culture of wound taken, Dr. Horton restarted patient on zofran po for nausea, and Norco for pain. [facility] restarted today in clinic on Mondays and Fridays and prn. Orders given to gently pack wound with aquacel ag rope, cover with aquacel extra, 4x4 gauze, small abd pad and mefix tape change dressing ever other day by [facility] [employer] and Prn per patient.     7/24/2019: Clinic visit with Dr. Horton. Continue with dressing change as ordered. Wound cultures reviewed with patient, lab work ordered by Dr. Horton, no fever present or reported at this time. C/o decrease appetite, medication periactin prescribed. Requesting needles for insulin pen, order prescribed by Dr. Horton.      7/31/2019: F/U clinic visit with Dr. Horton. C/o of excruciated back pain 10/10, not feeling well, lot of gas and left upper quadrant discomfort. New orders prescribed: Augmentin 875-125 mg by mouth twice a day. Bentyl 10 mg one tab by mouth 4 times a day. Tramadol 50 mg one tab by mouth every 6 hours as needed for pain. Abdomen ultrasound. Wound care dressing completed as ordered.    8/7/2019: Clinic visit with Dr. Horton, denies any pain at this time. Wound care dressing done as ordered, no changes in wound size from last visit noted. Continue taking oral antiiotic as ordered, pending abdominal ultrasound, scheduled for 8/13/2019.      Review of Systems   Constitutional: Negative.    HENT: Negative.    Eyes: Negative.    Respiratory: Negative.    Cardiovascular: Negative.    Gastrointestinal: Negative.    Genitourinary: Negative.    Musculoskeletal: Negative.    Skin: Negative.    Neurological: Negative.    Psychiatric/Behavioral: Negative.           Objective:        Physical Exam   Constitutional: She is oriented to person, place, and time. She appears well-developed and well-nourished.   HENT:   Head: Normocephalic.   Eyes: Pupils are equal, round, and reactive to light. Conjunctivae and EOM are normal.   Neck: Normal range of motion. Neck supple.   Cardiovascular: Normal rate, regular rhythm, normal heart sounds and intact distal pulses.   Pulmonary/Chest: Effort normal and breath sounds normal.   Abdominal: Soft. Bowel sounds are normal.   Musculoskeletal: Normal range of motion.   Neurological: She is alert and oriented to person, place, and time. She has normal reflexes.   Skin: Skin is warm and dry.       Vitals:    08/07/19 0837   BP: (!) 151/81   Pulse: 73   Temp: 98.1 °F (36.7 °C)       Assessment:           ICD-10-CM ICD-9-CM   1. Abscess of abdominal wall L02.211 682.2            Incision/Site 02/22/19 1146 Abdomen midline midline (Active)   02/22/19 1146    Present Prior to Hospital Arrival?:    Side:    Location: Abdomen   Orientation: midline   Incision Type: midline   Closure Method: Staples   Additional Comments:    Removal Indication and Assessment:    Wound Outcome:    Removal Indications:    Dressing Appearance Open to air 8/7/2019  8:41 AM   Drainage Amount Moderate 8/7/2019  8:41 AM   Drainage Characteristics/Odor Serosanguineous 8/7/2019  8:41 AM   Appearance Pink;Red 8/7/2019  8:41 AM   Red (%), Wound Tissue Color 100 % 8/7/2019  8:41 AM   Periwound Area Intact 8/7/2019  8:41 AM   Wound Edges Irregular 8/7/2019  8:41 AM   Wound Length (cm) 0.4 cm 8/7/2019  8:41 AM   Wound Width (cm) 0.5 cm 8/7/2019  8:41 AM   Wound Depth (cm) 0.6 cm 8/7/2019  8:41 AM   Wound Volume (cm^3) 0.12 cm^3 8/7/2019  8:41 AM   Wound Surface Area (cm^2) 0.2 cm^2 8/7/2019  8:41 AM   Care Cleansed with: 8/7/2019  8:41 AM   Dressing Applied;Gauze 8/7/2019  8:41 AM   Periwound Care Skin barrier film applied 8/7/2019  8:41 AM   Dressing Change Due 08/09/19 8/7/2019   8:41 AM         Medications Used in Clinic:  Lidocaine 2% gel or 4 % Topical solution: PRN  Other: Cavilon to chauncey wound, calmoseptine  Primary dressing: Apply gentamycin to wound bed. Gently pack wound with Aquacel Ag rope, cover with Aquacel Extra, 4x4 gauze, small ABD pad, secure with mefix tape.     Plan:               Follow up in about 1 week (around 8/14/2019).    Continue oral antibiotic until completion

## 2019-08-13 ENCOUNTER — HOSPITAL ENCOUNTER (OUTPATIENT)
Dept: RADIOLOGY | Facility: HOSPITAL | Age: 69
Discharge: HOME | End: 2019-08-13
Attending: SURGERY
Payer: MEDICARE

## 2019-08-13 PROCEDURE — 76705 ECHO EXAM OF ABDOMEN: CPT | Mod: 26,,, | Performed by: RADIOLOGY

## 2019-08-13 PROCEDURE — 76999 ECHO EXAMINATION PROCEDURE: CPT | Mod: TC

## 2019-08-13 PROCEDURE — 76705 PR US, ABDOMEN LIMITED: ICD-10-PCS | Mod: 26,,, | Performed by: RADIOLOGY

## 2019-08-14 ENCOUNTER — HOSPITAL ENCOUNTER (OUTPATIENT)
Dept: WOUND CARE | Facility: HOSPITAL | Age: 69
Discharge: HOME | End: 2019-08-14
Attending: SURGERY
Payer: MEDICARE

## 2019-08-14 DIAGNOSIS — I10 ESSENTIAL HYPERTENSION: ICD-10-CM

## 2019-08-14 DIAGNOSIS — K63.2 COLOCUTANEOUS FISTULA: ICD-10-CM

## 2019-08-14 DIAGNOSIS — K56.609 SMALL BOWEL OBSTRUCTION: ICD-10-CM

## 2019-08-14 DIAGNOSIS — K63.2 FISTULA OF INTESTINE, EXCLUDING RECTUM AND ANUS: ICD-10-CM

## 2019-08-14 DIAGNOSIS — L02.211 ABDOMINAL WALL ABSCESS: Primary | ICD-10-CM

## 2019-08-14 DIAGNOSIS — Z90.49 HISTORY OF HEMICOLECTOMY: ICD-10-CM

## 2019-08-14 DIAGNOSIS — K63.2 COLONIC FISTULA: ICD-10-CM

## 2019-08-14 PROCEDURE — 99213 OFFICE O/P EST LOW 20 MIN: CPT | Mod: 25

## 2019-08-14 PROCEDURE — 87076 CULTURE ANAEROBE IDENT EACH: CPT | Mod: 59

## 2019-08-14 PROCEDURE — 87075 CULTR BACTERIA EXCEPT BLOOD: CPT

## 2019-08-14 PROCEDURE — 87070 CULTURE OTHR SPECIMN AEROBIC: CPT

## 2019-08-14 PROCEDURE — 87186 SC STD MICRODIL/AGAR DIL: CPT | Mod: 59

## 2019-08-14 PROCEDURE — 10061 I&D ABSCESS COMP/MULTIPLE: CPT

## 2019-08-14 PROCEDURE — 87077 CULTURE AEROBIC IDENTIFY: CPT

## 2019-08-14 NOTE — PROCEDURES
Incision and Drainage  Date/Time: 8/14/2019 3:07 PM  Performed by: Ulisses Horton MD  Authorized by: Ulisses Horton MD   Type: abscess  Body area: trunk  Location details: abdomen  Anesthesia: local infiltration    Anesthesia:  Local Anesthetic: lidocaine 1% without epinephrine  Anesthetic total: 10 mL  Patient sedated: no  Description of findings: [facility]&D done at [facility] with iodoform packing   Scalpel size: 15  Incision type: elliptical  Complexity: complex  Drainage: bloody and  pus  Drainage amount: moderate  Wound treatment: wound packed  Packing material: 1/2 in gauze  Technical procedures used: patient tolerated well  Significant surgical tasks conducted by the assistant(s): mickie in surgery dressing applied  Complications: No  Estimated blood loss (mL): 30  Specimens: Yes  Implants: No

## 2019-08-14 NOTE — PROGRESS NOTES
Mrs. Morrison presented to wound care clinic for a visit with Dr. Horton. Pt crying with excruciating pain, vomiting. C/o a mass to her upper right side that she noticed yesterday. Requesting to bring her to the emergency room for pain control and further evaluation. Transferred to ED via wheelchair accompanied by a nurse, stable conditions. Report to ER nurse provided.

## 2019-08-14 NOTE — PROGRESS NOTES
"[facility]                Progress Note    Subjective:       Patient ID: Azucena Morrison is a 69 y.o. female.    Chief Complaint: Non-healing Wound    6/29/17: Pt re-admit for distal abdominal wound. Pt denies any fevers or chills but states she feels nauseous at times. Returned to [place] June 28, from [place], reports much trouble with abdominal wound while in [place].  7/6/17-- Patient scheduled for surgical drainage of wound Tuesday 7/11/17DB  7/19/17-- Patient post op clinic visit.  8/16/17: CT of abdomen and pelvis done 8/9/17 due to suspected fistula  8/23/17-- U/S of abdomen done today.  12/13/17 Wound vac with 20cc drainage in clinic today.  1/10/18: on PO abx  1/24/18: Fistulagram ordered per Dr. Horton. Patient still on PO abx  02/21/18 Patient is not on antibiotics at this time. BS fasting 135 per patient report this am.  03/07/18 Culture of Anterior Abdominal Wound is positive. No antibiotics at this time.  fasting per patient report.    03/21/18 Patient c/o nausea along with abdominal tenderness near abd midline wound. Patient states that pain level is 6 and that she passed two sero-sanguineous clots from wound. Patient is afebrile this am.  3/28/18: patient continue antibiotics and reports that pain is less than last weeks clinic visit. Drainage has decreased as well  04/04/18 Patient states that abdominal pain is "pressure" sensation and that when she pushes down on her abdomen on either side of abdominal wound she feels like she has to urinate. Patient reports pain level of 3 this am.  fasting this am per patient report.   5/2/18: Patient had Abdomina Toribio today before wound care. She had discontinued Bactrim PO per Dr. Horton's recommendation and culture results and is now taking Amoxicillin PO  6/20/18: Pt reports itching to wound and periwound   6/27/18: patient reports no new complaints with regards to her wound or abdomen, wound " continues to decrease in size and drainage  8/1/18: Pt reports increased pain to abdomen with nausea and emesis since thursday 7/26/18, denies any fevers, patient did no go to ER as instructed by home health nurse on Sat. 7/28/18.  8/29/18: Patient admitted to hospital 8/2 for abdominal wound complications. Surgery for abdominal abscess per Dr. Horton on 8/3. Patient placed on IV antibiotics and discharged home on 8/18 with [facility]  and [place] [facility]. Patient currently on IV ertapenem. IV medication sent to [facility].   9/5/18: IV Ertapenem to continue 1 week. Culture sent to lab. [facility]  sent orders. [place] [facility]  9/12/18 Antibiotics completed. Culture results pending. 1 deep stitch remaining.  9/12/18: Culture positive for E. Coli, patient to continue Ertapenem IV antibiotics x 2weeks. [facility] notified  9/26/18: F/U abdominal wound, wound continues to improve. Patient will complete IV antibiotics today  10/3/18: patient c/o diarrhea for 2 days and nausea with some vomiting. Labs and stool culture ordered. IV antibiotics discontinued today  10/10/2018 patient will start on IV antibiotic Invaz IV once a day, pending PICC line placement, order placed today  for PICC per Dr. Horton.  10/24/18: patient on IV antibiotics, tolerating well. Continue for 1 week  11/7/2018: IV Antibiotic discontinued.  11/8/2018: PICC line to left upper arm discontinued by Home Health.  11/21/2018 F/u for abdominal wall fistula , c/o nausea  No vomiting , no fever  12/5/2018: F/u for abdominal wall fisula, c/o gas, Dr. Horton will order Bentyl. Surgery schedule for January 2019.  12/12/2018: F/u for abdominal wall fistula, c/o abdominal pain. Dr. Horton ordered Norco 5/325mg tab 1 PO every 4 hours as needed for pain and referred patient for x-ray of abdomen after clinic today.  12/19/2018: F/u for abdominal wall fisula, c/o nausea, Dr. Horton re ordered Ondansetron (Zofran) 8mg  one tab by mouth every eight hours as needed for nausea. No changes in wound condition from last visit noted in clinic today.  12/26/2018: F/u abdominal wall fistula drainage, no c/o at this time. Denies any abdominal pain or nausea. Dr. Horton recommends surgery first week of January 2019 for abdominal fistula treatment and possible colostomy placement, patient agree.  01/02/2019: F/u abdominal wall fistula. Dressing with large amount of drainage, malodorous, Dr. Horton is scheduling surgery for third week of January, 2019.  1/16/19: F/U Dr. Horton today in clinic, surgery scheduled for next Friday 1/25/19 with Dr. Horton.     01/23/2019: Presents to wound care clinic for f/u abdominal wound care tx. Surgery scheduled with Dr. Horton for Friday 01/25/2019. Dr. Horton explained Mrs. Morrison surgery procedure including possible complications, Nurse  (Portuguese) present, patient verbalizes understanding of procedure including possible complications, all questions from patient were answered by Dr. Horton including blood sugar and blood pressure medications per patient's concerns. Consent for surgery and blood transfusion signed by patient, Dr. Horton and nurse witnessed.  Abdominal wound cultures collected per Dr. Horton, cultures sent to lab/micro pending results. Dr. Horton prescribed the following orders: fleet enema for Thursday 1/24/2019, clear liquid diet and not to eat after midnight all for 1/24/2019. Start taking Neomycin and erythromycin by mouth three times a day (1/24/2019) and dulcolax one tab by mouth twice a day, Mrs. Morrison voices understanding.     01/30/2019: F/U wound care treatment with Dr. Horton. Per pt, primary physician Dr. Pj Monae ordered for her to take potassium pills for three days, last day today and scheduled for lab work at primary physician clinic on 1/31/2019. Per pt. Feeling better, no more abdominal pain (went to ER 1/24/2019 and discharged 1/25/2019 c/o abd  "pain.). Dr. Horton awaiting on primary clearance to re-schedule surgery, per pt. She will call wound care clinic and Dr. Horton to make aware of clearance day. Continue with same orders for dressing change for Dr. Horton. Mrs. Morrison requesting gentamicin refill.     02/06/2019: F/u with Dr. Horton for wound care treatment. Mrs. Morrison brought to clinic a clearance for surgery by Dr. Pj Sanches dated 02/06/2019 "Hyperkalemia-Resolved K 4.8 2/1/2019, labs , Cr. 1.05, H/H 10/30. No contraindication to surgery, tight control of BS, Hydration." A copy of EKG (1/24/2019) and lab work from [facility] collected 1/31/2019, reported results 2/1/2019. Dr. Horton scheduled surgery for 02/22/2019, consent were signed on 01/23/2019, all questions were answered by Dr. Horton, this nurse  ([place]) present. Education provided to patient on the importance of having a clear liquid diet the day before surgery 02/21/2019 as per Dr. Horton, new medications and preop preparation before surgery, verbalizes understanding. Dr. Horton ordered Norco 5/325 mg PO for pain, Dulcolax two tabs by mouth to take on 02/21/2019, Soap suds enema (SSE) on Thursday 02/21/2019, Golytely by mouth 2 liters on 2/21/2019, Erythromycin 500 mg one tab by mouth three times a day and Neomycin 1 gm by mouth three times a day.     2/13/2019: Presents to wound care clinic for a f/u with Dr. Horton for wound care treatment. No new orders in wound dressing change, reviewed preop orders with Mrs. Morrison per Dr. Horton, all questions answered. Surgery scheduled for 02/22/2019.  2/20/19: Continues wound care a previously ordered.  Depth measured per Dr. Horton 8cm.  Preop orders reviewed with patient who verbalized understanding.  Surgery scheduled for Fri 2/22/19.  Rx given to patient for Norco and Zofran.    03/06/19: Patient admitted to [facility] on 02/22/19 for exploratory laparotomy of abdomen per Dr. Horton. Patient " "discharged on 02/28/19 with home health and PICC line with IV antibiotics. Staples removed today in clinic. Patient denies any fever, chills, n&v; however, she states that she has "low energy." Continued with wound care as ordered.     3/13/2019: F/U with Dr. Horton for wound care treatment. Continue with same wound dressing change orders as per Dr. Horton, orders followed. Home Health to continue wound dressing change and lab draw for CBC weekly; continue IV Ertapenem/Invaz 1 gm IV daily as ordered.     3/20/2019: Clinic visit with Dr. Horton for abdominal abscess treatment. Presents with moderate draining from abdominal wound; wound size decreasing per measurement. Per Dr. Horton, apply one-piece system ostomy bag to abdominal wound for drain collection, may drain bag when it fills and may change every other day, continue applying gentamicin to wound bed before applying ostomy bag. Continue IV Invaz until completion. If ostomy bag not effective for draining collection, may return to previous orders for wound dressing change. Orders followed. Education provided to Mrs. Morrison on handwashing and ostomy care techniques, voices and demonstrates understanding.   3/27/19: Follow up with Dr. Horton today in clinic for abdominal abscess, moderated tanish yellow drainge present on dressing.  Wound slowly improving, patient refused one-piece ostomy bag stated " no it's too messy." requested to return to previous dressing prior to ostomy bag placement- iodoform gauze, aquacel extra, sm abd, and mefix tape.  Silver nitrate x1 per Dr. Horton today in clinic. Rx given to patient for PO Zofran.     04/03/2019: F/u clinic visit with Dr. Horton. Abdominal wound improving in size, picture on file. Wound cultures from abdominal abscess collected and sent to lab/micro, pending results. Mrs. Morrison states going out of the country ([place] at the end of April and plans to stay there for approximately two months. Dr." Clifford ordered IV antibiotic for two more weeks and new wound care dressing, orders followed.     4/9/2019: Clinic visit with Dr. Horton.  Per Dr. Horton, wound deep measurement increased, draining present. Wound cultures from abdominal wound taken and sent to lab/micro, pending results. C/o abdominal pain and itching around wound, Rx for betamethasone cream 01.% and Norco 5/325mg give in clinic by Dr. Horton. Wound care dressing orders followed. Continue IV antibiotic as previously ordered.  04/17/19: F/u for non-healing wound to abdomen. Culture report negative. Dr. Horton ordered 1 more week of IV antibiotics. Continue with topical wound care as ordered.     4/24/2019: Clinic visit with Dr. Horton for abdominal wound treatment. Wound improvement present, pictures on file. Last IV antibiotic today 4/24/2019, [facility] to discontinue PICC line from left upper arm on 4/25/2019. Per Lyubov Prince, going to [place] on Friday 4/26/2019 and out of the country ([place]) on Monday 4/29/2019. This nurse spoke to Shaunna at [employer] (IV antibiotic delivery company) and informed last dose of antibiotic is today. Dr. Horton ordered Hydrocodone-acetaminophen (Norco) 5-325 mg (32 tabs), Bentyl 10 mg (120 capsules) and Gentamycin ointment. Education provided on the importance of eating food that contains iron (to prevent anemia) as well as eating meat and taking her blood pressure medications (blood pressure medication) on time, voices understanding. All questions answered by Dr. Horton. [facility] outpatient confirmed Betamethasone cream ordered on 4/10/2019 is ready for . Instructions and education provided to Mrs. Morrison on abdominal wound dressing changes, hand washing techniques and medication use, effects and side effects, voices and demonstrates understanding. Discharged home on stable conditions, [facility] will give wound care clinic a call when she is back from [place] in few months from  now.     7/17/19: Readmit today to wound care clinic.  Patient was recently out of the country visiting family in [place].  Patient complains of pain and nausea as on prior visits.  Dr. Horton seen patient today discussed with patient again placing colostomy patient refused.  Culture of wound taken, Dr. Horton restarted patient on zofran po for nausea, and Norco for pain. [facility] restarted today in clinic on Mondays and Fridays and prn. Orders given to gently pack wound with aquacel ag rope, cover with aquacel extra, 4x4 gauze, small abd pad and mefix tape change dressing ever other day by [facility] and Prn per patient.     7/24/2019: Clinic visit with Dr. Horton. Continue with dressing change as ordered. Wound cultures reviewed with patient, lab work ordered by Dr. Horton, no fever present or reported at this time. C/o decrease appetite, medication periactin prescribed. Requesting needles for insulin pen, order prescribed by Dr. Horton.      7/31/2019: F/U clinic visit with Dr. Horton. C/o of excruciated back pain 10/10, not feeling well, lot of gas and left upper quadrant discomfort. New orders prescribed: Augmentin 875-125 mg by mouth twice a day. Bentyl 10 mg one tab by mouth 4 times a day. Tramadol 50 mg one tab by mouth every 6 hours as needed for pain. Abdomen ultrasound. Wound care dressing completed as ordered.     8/7/2019: Clinic visit with Dr. Horton, denies any pain at this time. Wound care dressing done as ordered, no changes in wound size from last visit noted. Continue taking oral antiiotic as ordered, pending abdominal ultrasound, scheduled for 8/13/2019.    8/14/2019: F/U clinic visit with Dr. Horton. Admitted and discharged from emergency department this morning with abdominal pain, CT and ultrasound in filed. Dr. Horton performed a small drainage from the abdominal wound, moderate amount of creamy, serosanguineous fluid from abdominal wound present. Iodoform gauze packed  into her wound, dressing changed as ordered. Wound cultures collected and sent to lab/micro, pending results. Rx for Norco 5/325 mg one tab PO every 4 hours PRN as needed # 24 given to pt by Dr. Horton.      Review of Systems   Constitutional: Negative.    HENT: Negative.    Eyes: Negative.    Respiratory: Negative.    Cardiovascular: Negative.    Gastrointestinal: Negative.    Genitourinary: Negative.    Musculoskeletal: Negative.    Skin: Negative.    Neurological: Negative.    Psychiatric/Behavioral: Negative.          Objective:        Physical Exam   Constitutional: She is oriented to person, place, and time. She appears well-developed and well-nourished.   HENT:   Head: Normocephalic.   Eyes: Pupils are equal, round, and reactive to light. Conjunctivae and EOM are normal.   Neck: Normal range of motion. Neck supple.   Cardiovascular: Normal rate, regular rhythm, normal heart sounds and intact distal pulses.   Pulmonary/Chest: Effort normal and breath sounds normal.   Abdominal: Soft. Bowel sounds are normal.   Musculoskeletal: Normal range of motion.   Neurological: She is alert and oriented to person, place, and time. She has normal reflexes.   Skin: Skin is warm and dry.       There were no vitals filed for this visit.    Assessment:           ICD-10-CM ICD-9-CM   1. Abdominal wall abscess L02.211 682.2            Incision/Site 02/22/19 1146 Abdomen midline midline (Active)   02/22/19 1146    Present Prior to Hospital Arrival?:    Side:    Location: Abdomen   Orientation: midline   Incision Type: midline   Closure Method: Staples   Additional Comments:    Removal Indication and Assessment:    Wound Outcome:    Removal Indications:    Dressing Appearance Open to air 8/14/2019  1:00 PM   Drainage Amount Moderate 8/14/2019  1:00 PM   Drainage Characteristics/Odor Serosanguineous;Creamy 8/14/2019  1:00 PM   Appearance Pink;Red 8/14/2019  1:00 PM   Red (%), Wound Tissue Color 100 % 8/14/2019  1:00 PM   Periwound  Area Intact 8/14/2019  1:00 PM   Wound Edges Irregular 8/14/2019  1:00 PM   Wound Length (cm) 0.4 cm 8/14/2019  1:00 PM   Wound Width (cm) 0.5 cm 8/14/2019  1:00 PM   Wound Depth (cm) 8 cm 8/14/2019  1:00 PM   Wound Volume (cm^3) 1.6 cm^3 8/14/2019  1:00 PM   Wound Surface Area (cm^2) 0.2 cm^2 8/14/2019  1:00 PM   Care Cleansed with:;Sterile normal saline 8/14/2019  1:00 PM   Dressing Applied 8/14/2019  1:00 PM   Packing gauze, iodoform 8/14/2019  1:00 PM   Periwound Care Absorptive dressing applied;Skin barrier film applied 8/14/2019  1:00 PM   Dressing Change Due 08/16/19 8/14/2019  1:00 PM           Clean wound with normal saline  Lidocaine 2% gel or 4 % Topical solution: PRN  Other: Cavilon to chauncey wound  Primary dressing: Pack wound with Iodoform gauze, cover with 4x4 gauze, small ABD pad, secure with mefix tape.    Plan:                  Follow up in about 1 week (around 8/21/2019).  Rx for Norco 5/325 mg one tab PO every 4 hours PRN as needed # 24 given to pt by Dr. Horton.  Wound cultures collected and sent to lab/micro, pending results.

## 2019-08-16 LAB — BACTERIA SPEC AEROBE CULT: ABNORMAL

## 2019-08-19 LAB
BACTERIA SPEC ANAEROBE CULT: ABNORMAL
BACTERIA SPEC ANAEROBE CULT: ABNORMAL

## 2019-08-21 ENCOUNTER — HOSPITAL ENCOUNTER (OUTPATIENT)
Dept: WOUND CARE | Facility: HOSPITAL | Age: 69
Discharge: HOME | End: 2019-08-21
Attending: SURGERY
Payer: MEDICARE

## 2019-08-21 VITALS
WEIGHT: 165 LBS | SYSTOLIC BLOOD PRESSURE: 152 MMHG | RESPIRATION RATE: 20 BRPM | TEMPERATURE: 98 F | HEIGHT: 62 IN | DIASTOLIC BLOOD PRESSURE: 77 MMHG | BODY MASS INDEX: 30.36 KG/M2 | HEART RATE: 83 BPM

## 2019-08-21 DIAGNOSIS — Z90.49 HISTORY OF HEMICOLECTOMY: ICD-10-CM

## 2019-08-21 DIAGNOSIS — E11.628 DIABETES WITH SKIN COMPLICATION: ICD-10-CM

## 2019-08-21 DIAGNOSIS — I10 ESSENTIAL HYPERTENSION: ICD-10-CM

## 2019-08-21 DIAGNOSIS — K63.2 COLOCUTANEOUS FISTULA: ICD-10-CM

## 2019-08-21 DIAGNOSIS — L02.211 ABDOMINAL WALL ABSCESS: Primary | ICD-10-CM

## 2019-08-21 PROCEDURE — 99212 OFFICE O/P EST SF 10 MIN: CPT

## 2019-08-21 NOTE — PROGRESS NOTES
"[facility]                Progress Note    Subjective:       Patient ID: Azucena Morrison is a 69 y.o. female.    Chief Complaint: Non-healing Wound    6/29/17: Pt re-admit for distal abdominal wound. Pt denies any fevers or chills but states she feels nauseous at times. Returned to [place] June 28, from [place], reports much trouble with abdominal wound while in [place].  7/6/17-- Patient scheduled for surgical drainage of wound Tuesday 7/11/17DB  7/19/17-- Patient post op clinic visit.  8/16/17: CT of abdomen and pelvis done 8/9/17 due to suspected fistula  8/23/17-- U/S of abdomen done today.  12/13/17 Wound vac with 20cc drainage in clinic today.  1/10/18: on PO abx  1/24/18: Fistulagram ordered per Dr. Horton. Patient still on PO abx  02/21/18 Patient is not on antibiotics at this time. BS fasting 135 per patient report this am.  03/07/18 Culture of Anterior Abdominal Wound is positive. No antibiotics at this time.  fasting per patient report.    03/21/18 Patient c/o nausea along with abdominal tenderness near abd midline wound. Patient states that pain level is 6 and that she passed two sero-sanguineous clots from wound. Patient is afebrile this am.  3/28/18: patient continue antibiotics and reports that pain is less than last weeks clinic visit. Drainage has decreased as well  04/04/18 Patient states that abdominal pain is "pressure" sensation and that when she pushes down on her abdomen on either side of abdominal wound she feels like she has to urinate. Patient reports pain level of 3 this am.  fasting this am per patient report.   5/2/18: Patient had Abdomina Toribio today before wound care. She had discontinued Bactrim PO per Dr. Horton's recommendation and culture results and is now taking Amoxicillin PO  6/20/18: Pt reports itching to wound and periwound   6/27/18: patient reports no new complaints with regards to her wound or abdomen, wound " continues to decrease in size and drainage  8/1/18: Pt reports increased pain to abdomen with nausea and emesis since thursday 7/26/18, denies any fevers, patient did no go to ER as instructed by home health nurse on Sat. 7/28/18.  8/29/18: Patient admitted to hospital 8/2 for abdominal wound complications. Surgery for abdominal abscess per Dr. Horton on 8/3. Patient placed on IV antibiotics and discharged home on 8/18 with [facility]  and [facility] [place] [facility]. Patient currently on IV ertapenem. IV medication sent to patients home per [facility].   9/5/18: IV Ertapenem to continue 1 week. Culture sent to lab. [facility]  sent orders. [facility] line intact to [facility]  9/12/18 Antibiotics completed. Culture results pending. 1 deep stitch remaining.  9/12/18: Culture positive for E. Coli, patient to continue Ertapenem IV antibiotics x 2weeks. [facility] notified  9/26/18: F/U abdominal wound, wound continues to improve. Patient will complete IV antibiotics today  10/3/18: patient c/o diarrhea for 2 days and nausea with some vomiting. Labs and stool culture ordered. IV antibiotics discontinued today  10/10/2018 patient will start on IV antibiotic Invaz IV once a day, pending PICC line placement, order placed today  for PICC per Dr. Horton.  10/24/18: patient on IV antibiotics, tolerating well. Continue for 1 week  11/7/2018: IV Antibiotic discontinued.  11/8/2018: PICC line to left upper arm discontinued by Home Health.  11/21/2018 F/u for abdominal wall fistula , c/o nausea  No vomiting , no fever  12/5/2018: F/u for abdominal wall fisula, c/o gas, Dr. Horton will order Bentyl. Surgery schedule for January 2019.  12/12/2018: F/u for abdominal wall fistula, c/o abdominal pain. Dr. Horton ordered Norco 5/325mg tab 1 PO every 4 hours as needed for pain and referred patient for x-ray of abdomen after clinic today.  12/19/2018: F/u for abdominal wall fisula, c/o nausea, Dr. Horton re ordered Ondansetron (Zofran) 8mg  one tab by mouth every eight hours as needed for nausea. No changes in wound condition from last visit noted in clinic today.  12/26/2018: F/u abdominal wall fistula drainage, no c/o at this time. Denies any abdominal pain or nausea. Dr. Horton recommends surgery first week of January 2019 for abdominal fistula treatment and possible colostomy placement, patient agree.  01/02/2019: F/u abdominal wall fistula. Dressing with large amount of drainage, malodorous, Dr. Horton is scheduling surgery for third week of January, 2019.  1/16/19: F/U Dr. Horton today in clinic, surgery scheduled for next Friday 1/25/19 with Dr. Horton.     01/23/2019: Presents to wound care clinic for f/u abdominal wound care tx. Surgery scheduled with Dr. Horton for Friday 01/25/2019. Dr. Horton explained Mrs. Morrison surgery procedure including possible complications, Nurse  (Lao) present, patient verbalizes understanding of procedure including possible complications, all questions from patient were answered by Dr. Horton including blood sugar and blood pressure medications per patient's concerns. Consent for surgery and blood transfusion signed by patient, Dr. Horton and nurse witnessed.  Abdominal wound cultures collected per Dr. Horton, cultures sent to lab/micro pending results. Dr. Horton prescribed the following orders: fleet enema for Thursday 1/24/2019, clear liquid diet and not to eat after midnight all for 1/24/2019. Start taking Neomycin and erythromycin by mouth three times a day (1/24/2019) and dulcolax one tab by mouth twice a day, Mrs. Morrison voices understanding.     01/30/2019: F/U wound care treatment with Dr. Horton. Per pt, primary physician Dr. Pj Monae ordered for her to take potassium pills for three days, last day today and scheduled for lab work at primary physician clinic on 1/31/2019. Per pt. Feeling better, no more abdominal pain (went to ER 1/24/2019 and discharged 1/25/2019 c/o abd  "pain.). Dr. Horton awaiting on primary clearance to re-schedule surgery, per pt. She will call wound care clinic and Dr. Horton to make aware of clearance day. Continue with same orders for dressing change for Dr. Horton. Mrs. Morrison requesting gentamicin refill.     02/06/2019: F/u with Dr. Horton for wound care treatment. Mrs. Morrison brought to clinic a clearance for surgery by Dr. Pj Sanches dated 02/06/2019 "Hyperkalemia-Resolved K 4.8 2/1/2019, labs , Cr. 1.05, H/H 10/30. No contraindication to surgery, tight control of BS, Hydration." A copy of EKG (1/24/2019) and lab work from [facility] collected 1/31/2019, reported results 2/1/2019. Dr. Horton scheduled surgery for 02/22/2019, consent were signed on 01/23/2019, all questions were answered by Dr. Horton, this nurse  (Serbian) present. Education provided to patient on the importance of having a clear liquid diet the day before surgery 02/21/2019 as per Dr. Horton, new medications and preop preparation before surgery, verbalizes understanding. Dr. Horton ordered Norco 5/325 mg PO for pain, Dulcolax two tabs by mouth to take on 02/21/2019, Soap suds enema (SSE) on Thursday 02/21/2019, Golytely by mouth 2 liters on 2/21/2019, Erythromycin 500 mg one tab by mouth three times a day and Neomycin 1 gm by mouth three times a day.     2/13/2019: Presents to wound care clinic for a f/u with Dr. Horton for wound care treatment. No new orders in wound dressing change, reviewed preop orders with Mrs. Morrison per Dr. Horton, all questions answered. Surgery scheduled for 02/22/2019.  2/20/19: Continues wound care a previously ordered.  Depth measured per Dr. Horton 8cm.  Preop orders reviewed with patient who verbalized understanding.  Surgery scheduled for Fri 2/22/19.  Rx given to patient for Norco and Zofran.    03/06/19: Patient admitted to [facility] on 02/22/19 for exploratory laparotomy of abdomen per Dr. Horton. Patient " "discharged on 02/28/19 with home health and PICC line with IV antibiotics. Staples removed today in clinic. Patient denies any fever, chills, n&v; however, she states that she has "low energy." Continued with wound care as ordered.     3/13/2019: F/U with Dr. Horton for wound care treatment. Continue with same wound dressing change orders as per Dr. Horton, orders followed. Home Health to continue wound dressing change and lab draw for CBC weekly; continue IV Ertapenem/Invaz 1 gm IV daily as ordered.     3/20/2019: Clinic visit with Dr. Horton for abdominal abscess treatment. Presents with moderate draining from abdominal wound; wound size decreasing per measurement. Per Dr. Horton, apply one-piece system ostomy bag to abdominal wound for drain collection, may drain bag when it fills and may change every other day, continue applying gentamicin to wound bed before applying ostomy bag. Continue IV Invaz until completion. If ostomy bag not effective for draining collection, may return to previous orders for wound dressing change. Orders followed. Education provided to Mrs. Morrison on handwashing and ostomy care techniques, voices and demonstrates understanding.   3/27/19: Follow up with Dr. Horton today in clinic for abdominal abscess, moderated tanish yellow drainge present on dressing.  Wound slowly improving, patient refused one-piece ostomy bag stated " no it's too messy." requested to return to previous dressing prior to ostomy bag placement- iodoform gauze, aquacel extra, sm abd, and mefix tape.  Silver nitrate x1 per Dr. Horton today in clinic. Rx given to patient for PO Zofran.     04/03/2019: F/u clinic visit with Dr. Horton. Abdominal wound improving in size, picture on file. Wound cultures from abdominal abscess collected and sent to lab/micro, pending results. Mrs. Morrison states going out of the country ([place]) at the end of April and plans to stay there for approximately two months. Dr." Clifford ordered IV antibiotic for two more weeks and new wound care dressing, orders followed.     4/9/2019: Clinic visit with Dr. Horton.  Per Dr. Horton, wound deep measurement increased, draining present. Wound cultures from abdominal wound taken and sent to lab/micro, pending results. C/o abdominal pain and itching around wound, Rx for betamethasone cream 01.% and Norco 5/325mg give in clinic by Dr. Horton. Wound care dressing orders followed. Continue IV antibiotic as previously ordered.  04/17/19: F/u for non-healing wound to abdomen. Culture report negative. Dr. Horton ordered 1 more week of IV antibiotics. Continue with topical wound care as ordered.     4/24/2019: Clinic visit with Dr. Horton for abdominal wound treatment. Wound improvement present, pictures on file. Last IV antibiotic today 4/24/2019, [facility] to discontinue PICC line from left upper arm on 4/25/2019. Per Lyubov Prince, going to [place] on Friday 4/26/2019 and out of the country ([place]) on Monday 4/29/2019. This nurse spoke to Shaunna at [employer] (IV antibiotic delivery company) and informed last dose of antibiotic is today. Dr. Horton ordered Hydrocodone-acetaminophen (Norco) 5-325 mg (32 tabs), Bentyl 10 mg (120 capsules) and Gentamycin ointment. Education provided on the importance of eating food that contains iron (to prevent anemia) as well as eating meat and taking her blood pressure medications (blood pressure medication) on time, voices understanding. All questions answered by Dr. Horton. [facility] outpatient confirmed Betamethasone cream ordered on 4/10/2019 is ready for . Instructions and education provided to Mrs. Morrison on abdominal wound dressing changes, hand washing techniques and medication use, effects and side effects, voices and demonstrates understanding. Discharged home on stable conditions, [facility] will give wound care clinic a call when she is back from [place] in few months from  now.     7/17/19: Readmit today to wound care clinic.  Patient was recently out of the country visiting family in [place].  Patient complains of pain and nausea as on prior visits.  Dr. Horton seen patient today discussed with patient again placing colostomy patient refused.  Culture of wound taken, Dr. Horton restarted patient on zofran po for nausea, and Norco for pain. [facility] restarted today in clinic on Mondays and Fridays and prn. Orders given to gently pack wound with aquacel ag rope, cover with aquacel extra, 4x4 gauze, small abd pad and mefix tape change dressing ever other day by [facility] and Prn per patient.     7/24/2019: Clinic visit with Dr. Horton. Continue with dressing change as ordered. Wound cultures reviewed with patient, lab work ordered by Dr. Horton, no fever present or reported at this time. C/o decrease appetite, medication periactin prescribed. Requesting needles for insulin pen, order prescribed by Dr. Horton.      7/31/2019: F/U clinic visit with Dr. Horton. C/o of excruciated back pain 10/10, not feeling well, lot of gas and left upper quadrant discomfort. New orders prescribed: Augmentin 875-125 mg by mouth twice a day. Bentyl 10 mg one tab by mouth 4 times a day. Tramadol 50 mg one tab by mouth every 6 hours as needed for pain. Abdomen ultrasound. Wound care dressing completed as ordered.     8/7/2019: Clinic visit with Dr. Horton, denies any pain at this time. Wound care dressing done as ordered, no changes in wound size from last visit noted. Continue taking oral antiiotic as ordered, pending abdominal ultrasound, scheduled for 8/13/2019.     8/14/2019: F/U clinic visit with Dr. Horton. Admitted and discharged from emergency department this morning with abdominal pain, CT and ultrasound in filed. Dr. Horton performed a small drainage from the abdominal wound, moderate amount of creamy, serosanguineous fluid from abdominal wound present. Iodoform gauze packed  into her wound, dressing changed as ordered. Wound cultures collected and sent to lab/micro, pending results. Rx for Norco 5/325 mg one tab PO every 4 hours PRN as needed # 24 given to pt by Dr. Horton.      8/21/2019: Clinic visit with Dr. Horton. Wound dressing changed as ordered.       Review of Systems   Constitutional: Negative.    HENT: Negative.    Eyes: Negative.    Respiratory: Negative.    Cardiovascular: Negative.    Gastrointestinal: Negative.    Genitourinary: Negative.    Musculoskeletal: Negative.    Skin: Negative.    Neurological: Negative.    Psychiatric/Behavioral: Negative.          Objective:        Physical Exam   Constitutional: She is oriented to person, place, and time. She appears well-developed and well-nourished.   HENT:   Head: Normocephalic.   Eyes: Pupils are equal, round, and reactive to light. Conjunctivae and EOM are normal.   Neck: Normal range of motion. Neck supple.   Cardiovascular: Normal rate, regular rhythm, normal heart sounds and intact distal pulses.   Pulmonary/Chest: Effort normal and breath sounds normal.   Abdominal: Soft. Bowel sounds are normal.   Musculoskeletal: Normal range of motion.   Neurological: She is alert and oriented to person, place, and time. She has normal reflexes.   Skin: Skin is warm and dry.       Vitals:    08/21/19 0843   BP: (!) 152/77   Pulse: 83   Resp: 20   Temp: 98.2 °F (36.8 °C)       Assessment:           ICD-10-CM ICD-9-CM   1. Abdominal wall abscess L02.211 682.2            Incision/Site 02/22/19 1146 Abdomen midline midline (Active)   02/22/19 1146    Present Prior to Hospital Arrival?:    Side:    Location: Abdomen   Orientation: midline   Incision Type: midline   Closure Method: Staples   Additional Comments:    Removal Indication and Assessment:    Wound Outcome:    Removal Indications:    Wound Image   8/21/2019  8:00 AM   Dressing Appearance Moist drainage 8/21/2019  8:00 AM   Drainage Amount Moderate 8/21/2019  8:00 AM    Drainage Characteristics/Odor Serosanguineous 8/21/2019  8:00 AM   Appearance Red;Moist 8/21/2019  8:00 AM   Red (%), Wound Tissue Color 100 % 8/21/2019  8:00 AM   Periwound Area Intact 8/21/2019  8:00 AM   Wound Edges Defined 8/21/2019  8:00 AM   Wound Length (cm) 1 cm 8/21/2019  8:00 AM   Wound Width (cm) 1 cm 8/21/2019  8:00 AM   Wound Depth (cm) 8 cm 8/21/2019  8:00 AM   Wound Volume (cm^3) 8 cm^3 8/21/2019  8:00 AM   Wound Surface Area (cm^2) 1 cm^2 8/21/2019  8:00 AM   Care Cleansed with:;Sterile normal saline 8/21/2019  8:00 AM   Dressing Applied;Gauze 8/21/2019  8:00 AM   Packing gauze, iodoform 8/21/2019  8:00 AM   Periwound Care Absorptive dressing applied 8/21/2019  8:00 AM   Dressing Change Due 08/23/19 8/21/2019  8:00 AM         Clean wound with normal saline  Lidocaine 2% gel or 4 % Topical solution: PRN  Other: Cavilon to chauncey wound  Primary dressing: Apply gentamycin to wound bed. Pack wound with Iodoform gauze, cover with 4x4 gauze, small ABD pad, secure with mefix tape.    Plan:                Follow up in about 1 week (around 8/28/2019).

## 2019-08-27 ENCOUNTER — TELEPHONE (OUTPATIENT)
Dept: HOME HEALTH SERVICES | Facility: HOSPITAL | Age: 69
End: 2019-08-27

## 2019-08-28 ENCOUNTER — HOSPITAL ENCOUNTER (OUTPATIENT)
Dept: WOUND CARE | Facility: HOSPITAL | Age: 69
Discharge: HOME | End: 2019-08-28
Attending: SURGERY
Payer: MEDICARE

## 2019-08-28 VITALS
HEART RATE: 78 BPM | BODY MASS INDEX: 30.36 KG/M2 | RESPIRATION RATE: 18 BRPM | TEMPERATURE: 99 F | WEIGHT: 165 LBS | SYSTOLIC BLOOD PRESSURE: 152 MMHG | DIASTOLIC BLOOD PRESSURE: 82 MMHG | HEIGHT: 62 IN

## 2019-08-28 DIAGNOSIS — L02.211 ABDOMINAL WALL ABSCESS: Primary | ICD-10-CM

## 2019-08-28 DIAGNOSIS — Z98.890 S/P EXPLORATORY LAPAROTOMY: ICD-10-CM

## 2019-08-28 DIAGNOSIS — T14.8XXA DRAINAGE FROM WOUND: ICD-10-CM

## 2019-08-28 DIAGNOSIS — I10 ESSENTIAL HYPERTENSION: ICD-10-CM

## 2019-08-28 DIAGNOSIS — E11.9 TYPE 2 DIABETES MELLITUS WITHOUT COMPLICATION, WITHOUT LONG-TERM CURRENT USE OF INSULIN: ICD-10-CM

## 2019-08-28 PROCEDURE — 99212 OFFICE O/P EST SF 10 MIN: CPT

## 2019-08-28 NOTE — PROGRESS NOTES
"[facility]                Progress Note    Subjective:       Patient ID: Azucena Morrison is a 69 y.o. female.    Chief Complaint: Non-healing Wound    6/29/17: Pt re-admit for distal abdominal wound. Pt denies any fevers or chills but states she feels nauseous at times. Returned to [place] June 28, from [place], reports much trouble with abdominal wound while in [place].  7/6/17-- Patient scheduled for surgical drainage of wound Tuesday 7/11/17DB  7/19/17-- Patient post op clinic visit.  8/16/17: CT of abdomen and pelvis done 8/9/17 due to suspected fistula  8/23/17-- U/S of abdomen done today.  12/13/17 Wound vac with 20cc drainage in clinic today.  1/10/18: on PO abx  1/24/18: Fistulagram ordered per Dr. Horton. Patient still on PO abx  02/21/18 Patient is not on antibiotics at this time. BS fasting 135 per patient report this am.  03/07/18 Culture of Anterior Abdominal Wound is positive. No antibiotics at this time.  fasting per patient report.    03/21/18 Patient c/o nausea along with abdominal tenderness near abd midline wound. Patient states that pain level is 6 and that she passed two sero-sanguineous clots from wound. Patient is afebrile this am.  3/28/18: patient continue antibiotics and reports that pain is less than last weeks clinic visit. Drainage has decreased as well  04/04/18 Patient states that abdominal pain is "pressure" sensation and that when she pushes down on her abdomen on either side of abdominal wound she feels like she has to urinate. Patient reports pain level of 3 this am.  fasting this am per patient report.   5/2/18: Patient had Abdomina Toribio today before wound care. She had discontinued Bactrim PO per Dr. Horton's recommendation and culture results and is now taking Amoxicillin PO  6/20/18: Pt reports itching to wound and periwound   6/27/18: patient reports no new complaints with regards to her wound or abdomen, wound " continues to decrease in size and drainage  8/1/18: Pt reports increased pain to abdomen with nausea and emesis since thursday 7/26/18, denies any fevers, patient did no go to ER as instructed by home health nurse on Sat. 7/28/18.  8/29/18: Patient admitted to hospital 8/2 for abdominal wound complications. Surgery for abdominal abscess per Dr. Horton on 8/3. Patient placed on IV antibiotics and discharged home on 8/18 with [facility]  and PICC line to [facility]. Patient currently on IV ertapenem. IV medication sent to [facility] home per [facility].   9/5/18: IV Ertapenem to continue 1 week. Culture sent to lab. [facility]  sent orders. PICC line intact to [facility].  9/12/18 Antibiotics completed. Culture results pending. 1 deep stitch remaining.  9/12/18: Culture positive for E. Coli, patient to continue Ertapenem IV antibiotics x 2weeks. [facility] notified  9/26/18: F/U abdominal wound, wound continues to improve. Patient will complete IV antibiotics today  10/3/18: patient c/o diarrhea for 2 days and nausea with some vomiting. Labs and stool culture ordered. IV antibiotics discontinued today  10/10/2018 patient will start on IV antibiotic Invaz IV once a day, pending PICC line placement, order placed today  for PICC per Dr. Horton.  10/24/18: patient on IV antibiotics, tolerating well. Continue for 1 week  11/7/2018: IV Antibiotic discontinued.  11/8/2018: PICC line to left upper arm discontinued by Home Health.  11/21/2018 F/u for abdominal wall fistula , c/o nausea  No vomiting , no fever  12/5/2018: F/u for abdominal wall fisula, c/o gas, Dr. Horton will order Bentyl. Surgery schedule for January 2019.  12/12/2018: F/u for abdominal wall fistula, c/o abdominal pain. Dr. Horton ordered Norco 5/325mg tab 1 PO every 4 hours as needed for pain and referred patient for x-ray of abdomen after clinic today.  12/19/2018: F/u for abdominal wall fisula, c/o nausea, Dr. Horton re ordered Ondansetron (Zofran) 8mg  one tab by mouth every eight hours as needed for nausea. No changes in wound condition from last visit noted in clinic today.  12/26/2018: F/u abdominal wall fistula drainage, no c/o at this time. Denies any abdominal pain or nausea. Dr. Horton recommends surgery first week of January 2019 for abdominal fistula treatment and possible colostomy placement, patient agree.  01/02/2019: F/u abdominal wall fistula. Dressing with large amount of drainage, malodorous, Dr. Horton is scheduling surgery for third week of January, 2019.  1/16/19: F/U Dr. Horton today in clinic, surgery scheduled for next Friday 1/25/19 with Dr. Horton.     01/23/2019: Presents to wound care clinic for f/u abdominal wound care tx. Surgery scheduled with Dr. Horton for Friday 01/25/2019. Dr. Horton explained Mrs. Morrison surgery procedure including possible complications, Nurse  (Chinese) present, patient verbalizes understanding of procedure including possible complications, all questions from patient were answered by Dr. Horton including blood sugar and blood pressure medications per patient's concerns. Consent for surgery and blood transfusion signed by patient, Dr. Horton and nurse witnessed.  Abdominal wound cultures collected per Dr. Horton, cultures sent to lab/micro pending results. Dr. Horton prescribed the following orders: fleet enema for Thursday 1/24/2019, clear liquid diet and not to eat after midnight all for 1/24/2019. Start taking Neomycin and erythromycin by mouth three times a day (1/24/2019) and dulcolax one tab by mouth twice a day, Mrs. Morrison voices understanding.     01/30/2019: F/U wound care treatment with Dr. Horton. Per pt, primary physician Dr. Pj Monae ordered for her to take potassium pills for three days, last day today and scheduled for lab work at primary physician clinic on 1/31/2019. Per pt. Feeling better, no more abdominal pain (went to ER 1/24/2019 and discharged 1/25/2019 c/o abd  "pain.). Dr. Horton awaiting on primary clearance to re-schedule surgery, per pt. She will call wound care clinic and Dr. Horton to make aware of clearance day. Continue with same orders for dressing change for Dr. Horton. Mrs. Morrison requesting gentamicin refill.     02/06/2019: F/u with Dr. Horton for wound care treatment. Mrs. Morrison brought to clinic a clearance for surgery by Dr. Pj Sanches dated 02/06/2019 "Hyperkalemia-Resolved K 4.8 2/1/2019, labs , Cr. 1.05, H/H 10/30. No contraindication to surgery, tight control of BS, Hydration." A copy of EKG (1/24/2019) and lab work from [employer] collected 1/31/2019, reported results 2/1/2019. Dr. Horton scheduled surgery for 02/22/2019, consent were signed on 01/23/2019, all questions were answered by Dr. Horton, this nurse  ([place]) present. Education provided to patient on the importance of having a clear liquid diet the day before surgery 02/21/2019 as per Dr. Horton, new medications and preop preparation before surgery, verbalizes understanding. Dr. Horton ordered Norco 5/325 mg PO for pain, Dulcolax two tabs by mouth to take on 02/21/2019, Soap suds enema (SSE) on Thursday 02/21/2019, Golytely by mouth 2 liters on 2/21/2019, Erythromycin 500 mg one tab by mouth three times a day and Neomycin 1 gm by mouth three times a day.     2/13/2019: Presents to wound care clinic for a f/u with Dr. Horton for wound care treatment. No new orders in wound dressing change, reviewed preop orders with Mrs. Morrison per Dr. Horton, all questions answered. Surgery scheduled for 02/22/2019.  2/20/19: Continues wound care a previously ordered.  Depth measured per Dr. Horton 8cm.  Preop orders reviewed with patient who verbalized understanding.  Surgery scheduled for Fri 2/22/19.  Rx given to patient for Norco and Zofran.    03/06/19: Patient admitted to [facility] on 02/22/19 for exploratory laparotomy of abdomen per Dr. Horton. Patient " "discharged on 02/28/19 with home health and PICC line with IV antibiotics. Staples removed today in clinic. Patient denies any fever, chills, n&v; however, she states that she has "low energy." Continued with wound care as ordered.     3/13/2019: F/U with Dr. Horton for wound care treatment. Continue with same wound dressing change orders as per Dr. Horton, orders followed. Home Health to continue wound dressing change and lab draw for CBC weekly; continue IV Ertapenem/Invaz 1 gm IV daily as ordered.     3/20/2019: Clinic visit with Dr. Horton for abdominal abscess treatment. Presents with moderate draining from abdominal wound; wound size decreasing per measurement. Per Dr. Horton, apply one-piece system ostomy bag to abdominal wound for drain collection, may drain bag when it fills and may change every other day, continue applying gentamicin to wound bed before applying ostomy bag. Continue IV Invaz until completion. If ostomy bag not effective for draining collection, may return to previous orders for wound dressing change. Orders followed. Education provided to Mrs. Morrison on handwashing and ostomy care techniques, voices and demonstrates understanding.   3/27/19: Follow up with Dr. Horton today in clinic for abdominal abscess, moderated tanish yellow drainge present on dressing.  Wound slowly improving, patient refused one-piece ostomy bag stated " no it's too messy." requested to return to previous dressing prior to ostomy bag placement- iodoform gauze, aquacel extra, sm abd, and mefix tape.  Silver nitrate x1 per Dr. Horton today in clinic. Rx given to patient for PO Zofran.     04/03/2019: F/u clinic visit with Dr. Horton. Abdominal wound improving in size, picture on file. Wound cultures from abdominal abscess collected and sent to lab/micro, pending results. Mrs. Morrison states going out of the country ([place]) at the end of April and plans to stay there for approximately two months. Dr." Clifford ordered IV antibiotic for two more weeks and new wound care dressing, orders followed.     4/9/2019: Clinic visit with Dr. Horton.  Per Dr. Horton, wound deep measurement increased, draining present. Wound cultures from abdominal wound taken and sent to lab/micro, pending results. C/o abdominal pain and itching around wound, Rx for betamethasone cream 01.% and Norco 5/325mg give in clinic by Dr. Horton. Wound care dressing orders followed. Continue IV antibiotic as previously ordered.  04/17/19: F/u for non-healing wound to abdomen. Culture report negative. Dr. Horton ordered 1 more week of IV antibiotics. Continue with topical wound care as ordered.     4/24/2019: Clinic visit with Dr. Horton for abdominal wound treatment. Wound improvement present, pictures on file. Last IV antibiotic today 4/24/2019, [facility] to discontinue PICC line from left upper arm on 4/25/2019. Per Lyubov Prince, going to [place] on Friday 4/26/2019 and out of the country ([place]) on Monday 4/29/2019. This nurse spoke to Shaunna at [employer] ([employer] antibiotic delivery company) and informed last dose of antibiotic is today. Dr. Horton ordered Hydrocodone-acetaminophen (Norco) 5-325 mg (32 tabs), Bentyl 10 mg (120 capsules) and Gentamycin ointment. Education provided on the importance of eating food that contains iron (to prevent anemia) as well as eating meat and taking her blood pressure medications (blood pressure medication) on time, voices understanding. All questions answered by Dr. Horton. [facility] outpatient confirmed Betamethasone cream ordered on 4/10/2019 is ready for . Instructions and education provided to Mrs. Morrison on abdominal wound dressing changes, hand washing techniques and medication use, effects and side effects, voices and demonstrates understanding. Discharged home on stable conditions, [facility] will give wound care clinic a call when she is back from [place] in few months from  now.     7/17/19: Readmit today to wound care clinic.  Patient was recently out of the country visiting family in [place].  Patient complains of pain and nausea as on prior visits.  Dr. Horton seen patient today discussed with patient again placing colostomy patient refused.  Culture of wound taken, Dr. Horton restarted patient on zofran po for nausea, and Norco for pain. [facility] restarted today in clinic on Mondays and Fridays and prn. Orders given to gently pack wound with aquacel ag rope, cover with aquacel extra, 4x4 gauze, small abd pad and mefix tape change dressing ever other day by [facility] and Prn per patient.     7/24/2019: Clinic visit with Dr. Horton. Continue with dressing change as ordered. Wound cultures reviewed with patient, lab work ordered by Dr. Horton, no fever present or reported at this time. C/o decrease appetite, medication periactin prescribed. Requesting needles for insulin pen, order prescribed by Dr. Horton.      7/31/2019: F/U clinic visit with Dr. Horton. C/o of excruciated back pain 10/10, not feeling well, lot of gas and left upper quadrant discomfort. New orders prescribed: Augmentin 875-125 mg by mouth twice a day. Bentyl 10 mg one tab by mouth 4 times a day. Tramadol 50 mg one tab by mouth every 6 hours as needed for pain. Abdomen ultrasound. Wound care dressing completed as ordered.     8/7/2019: Clinic visit with Dr. Horton, denies any pain at this time. Wound care dressing done as ordered, no changes in wound size from last visit noted. Continue taking oral antiiotic as ordered, pending abdominal ultrasound, scheduled for 8/13/2019.     8/14/2019: F/U clinic visit with Dr. Horton. Admitted and discharged from emergency department this morning with abdominal pain, CT and ultrasound in filed. Dr. Horton performed a small drainage from the abdominal wound, moderate amount of creamy, serosanguineous fluid from abdominal wound present. Iodoform gauze packed  into her wound, dressing changed as ordered. Wound cultures collected and sent to lab/micro, pending results. Rx for Norco 5/325 mg one tab PO every 4 hours PRN as needed # 24 given to pt by Dr. Horton.      8/21/2019: Clinic visit with Dr. Horton. Wound dressing changed as ordered.     8/28/2019: Clinic visit with Dr. Horton. Continue with dressing change as ordered.       Review of Systems   Constitutional: Negative.    HENT: Negative.    Eyes: Negative.    Respiratory: Negative.    Cardiovascular: Negative.    Gastrointestinal: Negative.    Genitourinary: Negative.    Musculoskeletal: Negative.    Skin: Negative.    Neurological: Negative.    Psychiatric/Behavioral: Negative.          Objective:        Physical Exam   Constitutional: She is oriented to person, place, and time. She appears well-developed and well-nourished.   HENT:   Head: Normocephalic.   Eyes: Pupils are equal, round, and reactive to light. Conjunctivae and EOM are normal.   Neck: Normal range of motion. Neck supple.   Cardiovascular: Normal rate, regular rhythm, normal heart sounds and intact distal pulses.   Pulmonary/Chest: Effort normal and breath sounds normal.   Abdominal: Soft. Bowel sounds are normal.   Musculoskeletal: Normal range of motion.   Neurological: She is alert and oriented to person, place, and time. She has normal reflexes.   Skin: Skin is warm and dry.       Vitals:    08/28/19 0849   BP: (!) 152/82   Pulse: 78   Resp: 18   Temp: 98.6 °F (37 °C)       Assessment:           ICD-10-CM ICD-9-CM   1. Abdominal wall abscess L02.211 682.2            Incision/Site 02/22/19 1146 Abdomen midline midline (Active)   02/22/19 1146    Present Prior to Hospital Arrival?:    Side:    Location: Abdomen   Orientation: midline   Incision Type: midline   Closure Method: Staples   Additional Comments:    Removal Indication and Assessment:    Wound Outcome:    Removal Indications:    Dressing Appearance Open to air;Moist drainage 8/28/2019   8:00 AM   Drainage Amount Moderate 8/28/2019  8:00 AM   Drainage Characteristics/Odor Serosanguineous 8/28/2019  8:00 AM   Appearance Pink;Red 8/28/2019  8:00 AM   Red (%), Wound Tissue Color 100 % 8/28/2019  8:00 AM   Periwound Area Intact 8/28/2019  8:00 AM   Wound Edges Irregular 8/28/2019  8:00 AM   Wound Length (cm) 0.4 cm 8/28/2019  8:00 AM   Wound Width (cm) 0.5 cm 8/28/2019  8:00 AM   Wound Depth (cm) 7.5 cm 8/28/2019  8:00 AM   Wound Volume (cm^3) 1.5 cm^3 8/28/2019  8:00 AM   Wound Surface Area (cm^2) 0.2 cm^2 8/28/2019  8:00 AM   Care Cleansed with:;Sterile normal saline 8/28/2019  8:00 AM   Dressing Applied 8/28/2019  8:00 AM   Packing gauze, iodoform 8/28/2019  8:00 AM   Periwound Care Absorptive dressing applied 8/28/2019  8:00 AM   Dressing Change Due 08/30/19 8/28/2019  8:00 AM         Clean wound with normal saline  Lidocaine 2% gel or 4 % Topical solution: PRN  Other: Cavilon to chauncey wound  Primary dressing: Apply gentamycin to wound bed. Pack wound with Iodoform gauze, cover with 4x4 gauze, small ABD pad, secure with mefix tape.     Plan:                Follow up in about 1 week (around 9/4/2019).

## 2019-08-30 RX ORDER — HYDROCHLOROTHIAZIDE 25 MG/1
25 TABLET ORAL DAILY
Qty: 30 TABLET | Refills: 0 | Status: ON HOLD | OUTPATIENT
Start: 2019-08-30 | End: 2020-09-16 | Stop reason: SDUPTHER

## 2019-08-30 RX ORDER — CARVEDILOL 25 MG/1
25 TABLET ORAL DAILY
Qty: 30 TABLET | Refills: 1 | Status: ON HOLD | OUTPATIENT
Start: 2019-08-30 | End: 2019-12-24 | Stop reason: HOSPADM

## 2019-09-04 ENCOUNTER — HOSPITAL ENCOUNTER (OUTPATIENT)
Dept: WOUND CARE | Facility: HOSPITAL | Age: 69
Discharge: HOME | End: 2019-09-04
Attending: SURGERY
Payer: MEDICARE

## 2019-09-04 VITALS
BODY MASS INDEX: 30.36 KG/M2 | DIASTOLIC BLOOD PRESSURE: 80 MMHG | WEIGHT: 165 LBS | TEMPERATURE: 98 F | SYSTOLIC BLOOD PRESSURE: 145 MMHG | HEIGHT: 62 IN | HEART RATE: 84 BPM

## 2019-09-04 DIAGNOSIS — R10.84 GENERALIZED ABDOMINAL PAIN: ICD-10-CM

## 2019-09-04 DIAGNOSIS — E11.622 TYPE 2 DIABETES MELLITUS WITH OTHER SKIN ULCER, UNSPECIFIED WHETHER LONG TERM INSULIN USE: ICD-10-CM

## 2019-09-04 DIAGNOSIS — Z90.49 HISTORY OF HEMICOLECTOMY: Primary | ICD-10-CM

## 2019-09-04 DIAGNOSIS — Z93.3 STATUS POST HARTMANN'S PROCEDURE: ICD-10-CM

## 2019-09-04 DIAGNOSIS — E11.9 TYPE 2 DIABETES MELLITUS WITHOUT COMPLICATION, WITHOUT LONG-TERM CURRENT USE OF INSULIN: ICD-10-CM

## 2019-09-04 DIAGNOSIS — T81.89XD SUTURE GRANULOMA, SUBSEQUENT ENCOUNTER: ICD-10-CM

## 2019-09-04 DIAGNOSIS — L02.211 ABSCESS OF ABDOMINAL WALL: ICD-10-CM

## 2019-09-04 DIAGNOSIS — K63.2 COLONIC FISTULA: ICD-10-CM

## 2019-09-04 DIAGNOSIS — Z98.890 S/P EXPLORATORY LAPAROTOMY: ICD-10-CM

## 2019-09-04 DIAGNOSIS — I10 ESSENTIAL HYPERTENSION: ICD-10-CM

## 2019-09-04 DIAGNOSIS — L98.499 TYPE 2 DIABETES MELLITUS WITH OTHER SKIN ULCER, UNSPECIFIED WHETHER LONG TERM INSULIN USE: ICD-10-CM

## 2019-09-04 PROCEDURE — 99213 OFFICE O/P EST LOW 20 MIN: CPT

## 2019-09-04 RX ORDER — NAPROXEN 500 MG/1
500 TABLET ORAL 2 TIMES DAILY WITH MEALS
Qty: 60 TABLET | Refills: 1 | Status: ON HOLD | OUTPATIENT
Start: 2019-09-04 | End: 2020-09-16 | Stop reason: HOSPADM

## 2019-09-04 NOTE — PROGRESS NOTES
"Subjective:       Patient ID: Azucena Morrison is a 69 y.o. female.    Chief Complaint: Non-healing Wound    6/29/17: Pt re-admit for distal abdominal wound. Pt denies any fevers or chills but states she feels nauseous at times. Returned to [place] June 28, from [place], reports much trouble with abdominal wound while in [place].  7/6/17-- Patient scheduled for surgical drainage of wound Tuesday 7/11/17DB  7/19/17-- Patient post op clinic visit.  8/16/17: CT of abdomen and pelvis done 8/9/17 due to suspected fistula  8/23/17-- U/S of abdomen done today.  12/13/17 Wound vac with 20cc drainage in clinic today.  1/10/18: on PO abx  1/24/18: Fistulagram ordered per Dr. Horton. Patient still on PO abx  02/21/18 Patient is not on antibiotics at this time. BS fasting 135 per patient report this am.  03/07/18 Culture of Anterior Abdominal Wound is positive. No antibiotics at this time.  fasting per patient report.    03/21/18 Patient c/o nausea along with abdominal tenderness near abd midline wound. Patient states that pain level is 6 and that she passed two sero-sanguineous clots from wound. Patient is afebrile this am.  3/28/18: patient continue antibiotics and reports that pain is less than last weeks clinic visit. Drainage has decreased as well  04/04/18 Patient states that abdominal pain is "pressure" sensation and that when she pushes down on her abdomen on either side of abdominal wound she feels like she has to urinate. Patient reports pain level of 3 this am.  fasting this am per patient report.   5/2/18: Patient had Abdomina Toribio today before wound care. She had discontinued Bactrim PO per Dr. Horton's recommendation and culture results and is now taking Amoxicillin PO  6/20/18: Pt reports itching to wound and periwound   6/27/18: patient reports no new complaints with regards to her wound or abdomen, wound continues to decrease in size and drainage  8/1/18: Pt reports increased pain to abdomen with " nausea and emesis since thursday 7/26/18, denies any fevers, patient did no go to ER as instructed by home health nurse on Sat. 7/28/18.  8/29/18: Patient admitted to hospital 8/2 for abdominal wound complications. Surgery for abdominal abscess per Dr. Horton on 8/3. Patient placed on IV antibiotics and discharged home on 8/18 with [facility]  and PICC line to LUE. Patient currently on IV ertapenem. IV medication sent to patients home per [facility].   9/5/18: IV Ertapenem to continue 1 week. Culture sent to lab. [facility]  sent orders. PICC line intact to E.  9/12/18 Antibiotics completed. Culture results pending. 1 deep stitch remaining.  9/12/18: Culture positive for E. Coli, patient to continue Ertapenem IV antibiotics x 2weeks. [facility] notified  9/26/18: F/U abdominal wound, wound continues to improve. Patient will complete IV antibiotics today  10/3/18: patient c/o diarrhea for 2 days and nausea with some vomiting. Labs and stool culture ordered. IV antibiotics discontinued today  10/10/2018 patient will start on IV antibiotic Invaz IV once a day, pending PICC line placement, order placed today  for PICC per Dr. Horton.  10/24/18: patient on IV antibiotics, tolerating well. Continue for 1 week  11/7/2018: IV Antibiotic discontinued.  11/8/2018: PICC line to left upper arm discontinued by Home Health.  11/21/2018 F/u for abdominal wall fistula , c/o nausea  No vomiting , no fever  12/5/2018: F/u for abdominal wall fisula, c/o gas, Dr. Horton will order Bentyl. Surgery schedule for January 2019.  12/12/2018: F/u for abdominal wall fistula, c/o abdominal pain. Dr. Horton ordered Norco 5/325mg tab 1 PO every 4 hours as needed for pain and referred patient for x-ray of abdomen after clinic today.  12/19/2018: F/u for abdominal wall fisula, c/o nausea, Dr. Horton re ordered Ondansetron (Zofran) 8mg one tab by mouth every eight hours as needed for nausea. No changes in wound condition from  last visit noted in clinic today.  12/26/2018: F/u abdominal wall fistula drainage, no c/o at this time. Denies any abdominal pain or nausea. Dr. Horton recommends surgery first week of January 2019 for abdominal fistula treatment and possible colostomy placement, patient agree.  01/02/2019: F/u abdominal wall fistula. Dressing with large amount of drainage, malodorous, Dr. Horton is scheduling surgery for third week of January, 2019.  1/16/19: F/U Dr. Horton today in clinic, surgery scheduled for next Friday 1/25/19 with Dr. Horton.     01/23/2019: Presents to wound care clinic for f/u abdominal wound care tx. Surgery scheduled with Dr. Horton for Friday 01/25/2019. Dr. Horton explained Mrs. Morrison surgery procedure including possible complications, Nurse  (Frisian) present, patient verbalizes understanding of procedure including possible complications, all questions from patient were answered by Dr. Horton including blood sugar and blood pressure medications per patient's concerns. Consent for surgery and blood transfusion signed by patient, Dr. Horton and nurse witnessed.  Abdominal wound cultures collected per Dr. Horton, cultures sent to lab/micro pending results. Dr. Horton prescribed the following orders: fleet enema for Thursday 1/24/2019, clear liquid diet and not to eat after midnight all for 1/24/2019. Start taking Neomycin and erythromycin by mouth three times a day (1/24/2019) and dulcolax one tab by mouth twice a day, Mrs. Morrison voices understanding.     01/30/2019: F/U wound care treatment with Dr. Horton. Per pt, primary physician Dr. Pj Monae ordered for her to take potassium pills for three days, last day today and scheduled for lab work at primary physician clinic on 1/31/2019. Per pt. Feeling better, no more abdominal pain (went to ER 1/24/2019 and discharged 1/25/2019 c/o abd pain.). Dr. Horton awaiting on primary clearance to re-schedule surgery, per pt. She will  "call wound care clinic and Dr. Horton to make aware of clearance day. Continue with same orders for dressing change for Dr. Horton. Mrs. Morrison requesting gentamicin refill.     02/06/2019: F/u with Dr. Horton for wound care treatment. Mrs. Morrison brought to clinic a clearance for surgery by Dr. Pj Sanches dated 02/06/2019 "Hyperkalemia-Resolved K 4.8 2/1/2019, labs , Cr. 1.05, H/H 10/30. No contraindication to surgery, tight control of BS, Hydration." A copy of EKG (1/24/2019) and lab work from [facility] collected 1/31/2019, reported results 2/1/2019. Dr. Horton scheduled surgery for 02/22/2019, consent were signed on 01/23/2019, all questions were answered by Dr. Horton, this nurse  (Arabic) present. Education provided to patient on the importance of having a clear liquid diet the day before surgery 02/21/2019 as per Dr. Horton, new medications and preop preparation before surgery, verbalizes understanding. Dr. Horton ordered Norco 5/325 mg PO for pain, Dulcolax two tabs by mouth to take on 02/21/2019, Soap suds enema (SSE) on Thursday 02/21/2019, Golytely by mouth 2 liters on 2/21/2019, Erythromycin 500 mg one tab by mouth three times a day and Neomycin 1 gm by mouth three times a day.     2/13/2019: Presents to wound care clinic for a f/u with Dr. Horton for wound care treatment. No new orders in wound dressing change, reviewed preop orders with Mrs. Morrison per Dr. Horton, all questions answered. Surgery scheduled for 02/22/2019.  2/20/19: Continues wound care a previously ordered.  Depth measured per Dr. Horton 8cm.  Preop orders reviewed with patient who verbalized understanding.  Surgery scheduled for Fri 2/22/19.  Rx given to patient for Norco and Zofran.    03/06/19: Patient admitted to [facility] on 02/22/19 for exploratory laparotomy of abdomen per Dr. Horton. Patient discharged on 02/28/19 with home health and PICC line with IV antibiotics. Staples removed " "today in clinic. Patient denies any fever, chills, n&v; however, she states that she has "low energy." Continued with wound care as ordered.     3/13/2019: F/U with Dr. Horton for wound care treatment. Continue with same wound dressing change orders as per Dr. Horton, orders followed. Home Health to continue wound dressing change and lab draw for CBC weekly; continue IV Ertapenem/Invaz 1 gm IV daily as ordered.     3/20/2019: Clinic visit with Dr. Horton for abdominal abscess treatment. Presents with moderate draining from abdominal wound; wound size decreasing per measurement. Per Dr. Horton, apply one-piece system ostomy bag to abdominal wound for drain collection, may drain bag when it fills and may change every other day, continue applying gentamicin to wound bed before applying ostomy bag. Continue IV Invaz until completion. If ostomy bag not effective for draining collection, may return to previous orders for wound dressing change. Orders followed. Education provided to Mrs. Morrison on handwashing and ostomy care techniques, voices and demonstrates understanding.   3/27/19: Follow up with Dr. Horton today in clinic for abdominal abscess, moderated tanish yellow drainge present on dressing.  Wound slowly improving, patient refused one-piece ostomy bag stated " no it's too messy." requested to return to previous dressing prior to ostomy bag placement- iodoform gauze, aquacel extra, sm abd, and mefix tape.  Silver nitrate x1 per Dr. Horton today in clinic. Rx given to patient for PO Zofran.     04/03/2019: F/u clinic visit with Dr. Horton. Abdominal wound improving in size, picture on file. Wound cultures from abdominal abscess collected and sent to lab/micro, pending results. Mrs. Morrison states going out of the country ([place]) at the end of April and plans to stay there for approximately two months. Dr. Horton ordered IV antibiotic for two more weeks and new wound care dressing, orders " followed.     4/9/2019: Clinic visit with Dr. Horton.  Per Dr. Horton, wound deep measurement increased, draining present. Wound cultures from abdominal wound taken and sent to lab/micro, pending results. C/o abdominal pain and itching around wound, Rx for betamethasone cream 01.% and Norco 5/325mg give in clinic by Dr. Horton. Wound care dressing orders followed. Continue IV antibiotic as previously ordered.  04/17/19: F/u for non-healing wound to abdomen. Culture report negative. Dr. Horton ordered 1 more week of IV antibiotics. Continue with topical wound care as ordered.     4/24/2019: Clinic visit with Dr. Horton for abdominal wound treatment. Wound improvement present, pictures on file. Last IV antibiotic today 4/24/2019, [facility] to discontinue PICC line from left upper arm on 4/25/2019. Per Lyubov Prince, going to [place] on Friday 4/26/2019 and out of the country ([place]) on Monday 4/29/2019. This nurse spoke to Shaunna at [employer] (IV antibiotic delivery company) and informed last dose of antibiotic is today. Dr. Horton ordered Hydrocodone-acetaminophen (Norco) 5-325 mg (32 tabs), Bentyl 10 mg (120 capsules) and Gentamycin ointment. Education provided on the importance of eating food that contains iron (to prevent anemia) as well as eating meat and taking her blood pressure medications (blood pressure medication) on time, voices understanding. All questions answered by Dr. Horton. [facility] outpatient confirmed Betamethasone cream ordered on 4/10/2019 is ready for . Instructions and education provided to Mrs. Morrison on abdominal wound dressing changes, hand washing techniques and medication use, effects and side effects, voices and demonstrates understanding. Discharged home on stable conditions, [facility] will give wound care clinic a call when she is back from [place] in few months from now.     7/17/19: Readmit today to wound care clinic.  Patient was recently out of the  country visiting family in [place].  Patient complains of pain and nausea as on prior visits.  Dr. Horton seen patient today discussed with patient again placing colostomy patient refused.  Culture of wound taken, Dr. Horton restarted patient on zofran po for nausea, and Norco for pain. [facility] restarted today in clinic on Mondays and Fridays and prn. Orders given to gently pack wound with aquacel ag rope, cover with aquacel extra, 4x4 gauze, small abd pad and mefix tape change dressing ever other day by [facility] and Prn per patient.     7/24/2019: Clinic visit with Dr. Horton. Continue with dressing change as ordered. Wound cultures reviewed with patient, lab work ordered by Dr. Horton, no fever present or reported at this time. C/o decrease appetite, medication periactin prescribed. Requesting needles for insulin pen, order prescribed by Dr. Horton.      7/31/2019: F/U clinic visit with Dr. Horton. C/o of excruciated back pain 10/10, not feeling well, lot of gas and left upper quadrant discomfort. New orders prescribed: Augmentin 875-125 mg by mouth twice a day. Bentyl 10 mg one tab by mouth 4 times a day. Tramadol 50 mg one tab by mouth every 6 hours as needed for pain. Abdomen ultrasound. Wound care dressing completed as ordered.     8/7/2019: Clinic visit with Dr. Horton, denies any pain at this time. Wound care dressing done as ordered, no changes in wound size from last visit noted. Continue taking oral antiiotic as ordered, pending abdominal ultrasound, scheduled for 8/13/2019.     8/14/2019: F/U clinic visit with Dr. Horton. Admitted and discharged from emergency department this morning with abdominal pain, CT and ultrasound in filed. Dr. Horton performed a small drainage from the abdominal wound, moderate amount of creamy, serosanguineous fluid from abdominal wound present. Iodoform gauze packed into her wound, dressing changed as ordered. Wound cultures collected and sent to  lab/micro, pending results. Rx for Norco 5/325 mg one tab PO every 4 hours PRN as needed # 24 given to pt by Dr. Horton.      8/21/2019: Clinic visit with Dr. Horton. Wound dressing changed as ordered.     8/28/2019: Clinic visit with Dr. Horton. Continue with dressing change as ordered.     09/04/19: F/u wound care visit with Dr. Horton. Continue current wound care treatment. CBC and CMP ordered today    Review of Systems   Constitutional: Negative.    HENT: Negative.    Eyes: Negative.    Respiratory: Negative.    Cardiovascular: Negative.    Gastrointestinal: Positive for abdominal pain.   Genitourinary: Negative.    Musculoskeletal: Negative.    Skin: Negative.    Neurological: Negative.    Psychiatric/Behavioral: Negative.        Objective:      Physical Exam   Constitutional: She is oriented to person, place, and time. She appears well-developed and well-nourished.   HENT:   Head: Normocephalic.   Eyes: Pupils are equal, round, and reactive to light. Conjunctivae and EOM are normal.   Neck: Normal range of motion. Neck supple.   Cardiovascular: Normal rate, regular rhythm, normal heart sounds and intact distal pulses.   Pulmonary/Chest: Effort normal and breath sounds normal.   Abdominal: Soft. Bowel sounds are normal.   Musculoskeletal: Normal range of motion.   Neurological: She is alert and oriented to person, place, and time. She has normal reflexes.   Skin: Skin is warm and dry.       Assessment:       1. History of hemicolectomy    2. Status post Aiyana's procedure    3. Essential hypertension    4. S/P exploratory laparotomy    5. Abscess of abdominal wall    6. 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, unspecified whether long term insulin use    7. Type 2 diabetes mellitus without complication, without long-term current use of insulin    8. Suture granuloma, subsequent encounter    9. Generalized abdominal pain    10. Colonic fistula           Incision/Site 02/22/19 1146 Abdomen midline  midline (Active)   02/22/19 1146    Present Prior to Hospital Arrival?:    Side:    Location: Abdomen   Orientation: midline   Incision Type: midline   Closure Method: Staples   Additional Comments:    Removal Indication and Assessment:    Wound Outcome:    Removal Indications:    Dressing Appearance Intact;Moist drainage 9/4/2019  9:00 AM   Drainage Amount Moderate 9/4/2019  9:00 AM   Drainage Characteristics/Odor Serosanguineous 9/4/2019  9:00 AM   Appearance Red;Pink;Moist 9/4/2019  9:00 AM   Red (%), Wound Tissue Color 100 % 9/4/2019  9:00 AM   Periwound Area Intact 9/4/2019  9:00 AM   Wound Edges Defined 9/4/2019  9:00 AM   Wound Length (cm) 0.7 cm 9/4/2019  9:00 AM   Wound Width (cm) 0.8 cm 9/4/2019  9:00 AM   Wound Depth (cm) 8.8 cm 9/4/2019  9:00 AM   Wound Volume (cm^3) 4.93 cm^3 9/4/2019  9:00 AM   Wound Surface Area (cm^2) 0.56 cm^2 9/4/2019  9:00 AM   Undermining (depth (cm)/location) 3.5cm from 11:00-1:00 9/4/2019  9:00 AM   Care Cleansed with:;Sterile normal saline 9/4/2019  9:00 AM   Dressing Applied;Other (see comments);Gauze;Abd pad 9/4/2019  9:00 AM   Packing gauze, iodoform;Incision packed with 9/4/2019  9:00 AM   Periwound Care Skin barrier film applied 9/4/2019  9:00 AM   Dressing Change Due 09/06/19 9/4/2019  9:00 AM       Clean wound with normal saline  Lidocaine 2% gel or 4 % Topical solution: PRN  Other: Cavilon to chauncey wound  Primary dressing: Apply gentamycin to wound bed. Pack wound with Iodoform gauze, cover with 4x4 gauze, small ABD pad, secure with mefix tape.  Frequency: Mondays and Fridays by [facility] and on Wednesdays at wound care clinic.  Other orders: Continue po antibiotics. CBC and CMP ordered per Dr. Horton      Plan:                Follow up in about 1 week (around 9/11/2019) for Dr. Horton.

## 2019-09-09 ENCOUNTER — LAB VISIT (OUTPATIENT)
Dept: LAB | Facility: HOSPITAL | Age: 69
End: 2019-09-09
Attending: SURGERY
Payer: MEDICARE

## 2019-09-09 DIAGNOSIS — K91.89 POSTCHOLECYSTECTOMY DIARRHEA: Primary | ICD-10-CM

## 2019-09-09 DIAGNOSIS — R19.7 POSTCHOLECYSTECTOMY DIARRHEA: Primary | ICD-10-CM

## 2019-09-09 LAB
ALBUMIN SERPL BCP-MCNC: 3.2 G/DL (ref 3.5–5.2)
ALP SERPL-CCNC: 74 U/L (ref 55–135)
ALT SERPL W/O P-5'-P-CCNC: 15 U/L (ref 10–44)
ANION GAP SERPL CALC-SCNC: 14 MMOL/L (ref 8–16)
AST SERPL-CCNC: 18 U/L (ref 10–40)
BILIRUB SERPL-MCNC: 0.2 MG/DL (ref 0.1–1)
BUN SERPL-MCNC: 21 MG/DL (ref 8–23)
CALCIUM SERPL-MCNC: 9.9 MG/DL (ref 8.7–10.5)
CHLORIDE SERPL-SCNC: 98 MMOL/L (ref 95–110)
CO2 SERPL-SCNC: 26 MMOL/L (ref 23–29)
CREAT SERPL-MCNC: 1 MG/DL (ref 0.5–1.4)
ERYTHROCYTE [DISTWIDTH] IN BLOOD BY AUTOMATED COUNT: 13.8 % (ref 11.5–14.5)
EST. GFR  (AFRICAN AMERICAN): >60 ML/MIN/1.73 M^2
EST. GFR  (NON AFRICAN AMERICAN): 58 ML/MIN/1.73 M^2
GLUCOSE SERPL-MCNC: 195 MG/DL (ref 70–110)
HCT VFR BLD AUTO: 32.2 % (ref 37–48.5)
HGB BLD-MCNC: 9.9 G/DL (ref 12–16)
MCH RBC QN AUTO: 24.9 PG (ref 27–31)
MCHC RBC AUTO-ENTMCNC: 30.7 G/DL (ref 32–36)
MCV RBC AUTO: 81 FL (ref 82–98)
PLATELET # BLD AUTO: 414 K/UL (ref 150–350)
PMV BLD AUTO: 8.7 FL (ref 9.2–12.9)
POTASSIUM SERPL-SCNC: 4.2 MMOL/L (ref 3.5–5.1)
PROT SERPL-MCNC: 8.3 G/DL (ref 6–8.4)
RBC # BLD AUTO: 3.98 M/UL (ref 4–5.4)
SODIUM SERPL-SCNC: 138 MMOL/L (ref 136–145)
WBC # BLD AUTO: 8.74 K/UL (ref 3.9–12.7)

## 2019-09-09 PROCEDURE — 80053 COMPREHEN METABOLIC PANEL: CPT

## 2019-09-09 PROCEDURE — 85027 COMPLETE CBC AUTOMATED: CPT

## 2019-09-11 ENCOUNTER — HOSPITAL ENCOUNTER (OUTPATIENT)
Dept: WOUND CARE | Facility: HOSPITAL | Age: 69
Discharge: HOME | End: 2019-09-11
Attending: SURGERY
Payer: MEDICARE

## 2019-09-11 VITALS
HEART RATE: 90 BPM | TEMPERATURE: 97 F | DIASTOLIC BLOOD PRESSURE: 83 MMHG | SYSTOLIC BLOOD PRESSURE: 147 MMHG | HEIGHT: 62 IN | WEIGHT: 168 LBS | BODY MASS INDEX: 30.91 KG/M2

## 2019-09-11 DIAGNOSIS — I10 ESSENTIAL HYPERTENSION: ICD-10-CM

## 2019-09-11 DIAGNOSIS — L02.211 ABDOMINAL WALL ABSCESS: ICD-10-CM

## 2019-09-11 DIAGNOSIS — E11.9 TYPE 2 DIABETES MELLITUS WITHOUT COMPLICATION, WITHOUT LONG-TERM CURRENT USE OF INSULIN: ICD-10-CM

## 2019-09-11 DIAGNOSIS — A49.8 INFECTION DUE TO ESBL-PRODUCING ESCHERICHIA COLI: ICD-10-CM

## 2019-09-11 DIAGNOSIS — L98.499 TYPE 2 DIABETES MELLITUS WITH OTHER SKIN ULCER, UNSPECIFIED WHETHER LONG TERM INSULIN USE: ICD-10-CM

## 2019-09-11 DIAGNOSIS — K63.2 COLOCUTANEOUS FISTULA: Primary | ICD-10-CM

## 2019-09-11 DIAGNOSIS — Z16.12 INFECTION DUE TO ESBL-PRODUCING ESCHERICHIA COLI: ICD-10-CM

## 2019-09-11 DIAGNOSIS — E11.622 TYPE 2 DIABETES MELLITUS WITH OTHER SKIN ULCER, UNSPECIFIED WHETHER LONG TERM INSULIN USE: ICD-10-CM

## 2019-09-11 DIAGNOSIS — R10.32 LLQ PAIN: ICD-10-CM

## 2019-09-11 PROCEDURE — 99212 OFFICE O/P EST SF 10 MIN: CPT

## 2019-09-11 RX ORDER — AMOXICILLIN AND CLAVULANATE POTASSIUM 875; 125 MG/1; MG/1
1 TABLET, FILM COATED ORAL 2 TIMES DAILY
Qty: 60 TABLET | Refills: 1 | Status: SHIPPED | OUTPATIENT
Start: 2019-09-11 | End: 2019-11-06 | Stop reason: SDUPTHER

## 2019-09-11 RX ORDER — NAPROXEN 500 MG/1
500 TABLET ORAL 2 TIMES DAILY WITH MEALS
Qty: 60 TABLET | Refills: 1 | Status: ON HOLD | OUTPATIENT
Start: 2019-09-11 | End: 2019-12-24 | Stop reason: HOSPADM

## 2019-09-11 RX ORDER — TRAMADOL HYDROCHLORIDE 50 MG/1
TABLET ORAL
Qty: 24 TABLET | Refills: 0 | Status: SHIPPED | OUTPATIENT
Start: 2019-09-11 | End: 2019-10-09 | Stop reason: SDUPTHER

## 2019-09-11 NOTE — PROGRESS NOTES
"[facility]                Progress Note    Subjective:       Patient ID: Azucena Morrison is a 69 y.o. female.    Chief Complaint: Non-healing Wound    6/29/17: Pt re-admit for distal abdominal wound. Pt denies any fevers or chills but states she feels nauseous at times. Returned to [place] June 28, from [place], reports much trouble with abdominal wound while in [place].  7/6/17-- Patient scheduled for surgical drainage of wound Tuesday 7/11/17DB  7/19/17-- Patient post op clinic visit.  8/16/17: CT of abdomen and pelvis done 8/9/17 due to suspected fistula  8/23/17-- U/S of abdomen done today.  12/13/17 Wound vac with 20cc drainage in clinic today.  1/10/18: on PO abx  1/24/18: Fistulagram ordered per Dr. Horton. Patient still on PO abx  02/21/18 Patient is not on antibiotics at this time. BS fasting 135 per patient report this am.  03/07/18 Culture of Anterior Abdominal Wound is positive. No antibiotics at this time.  fasting per patient report.    03/21/18 Patient c/o nausea along with abdominal tenderness near abd midline wound. Patient states that pain level is 6 and that she passed two sero-sanguineous clots from wound. Patient is afebrile this am.  3/28/18: patient continue antibiotics and reports that pain is less than last weeks clinic visit. Drainage has decreased as well  04/04/18 Patient states that abdominal pain is "pressure" sensation and that when she pushes down on her abdomen on either side of abdominal wound she feels like she has to urinate. Patient reports pain level of 3 this am.  fasting this am per patient report.   5/2/18: Patient had Abdomina Toribio today before wound care. She had discontinued Bactrim PO per Dr. Horton's recommendation and culture results and is now taking Amoxicillin PO  6/20/18: Pt reports itching to wound and periwound   6/27/18: patient reports no new complaints with regards to her wound or abdomen, wound " continues to decrease in size and drainage  8/1/18: Pt reports increased pain to abdomen with nausea and emesis since thursday 7/26/18, denies any fevers, patient did no go to ER as instructed by home health nurse on Sat. 7/28/18.  8/29/18: Patient admitted to hospital 8/2 for abdominal wound complications. Surgery for abdominal abscess per Dr. Horton on 8/3. Patient placed on IV antibiotics and discharged home on 8/18 with [facility]  and [facility] line to [facility]. Patient currently on IV ertapenem. IV medication sent to patients home per [facility].   9/5/18: IV Ertapenem to continue 1 week. Culture sent to lab. [facility]  sent orders. PICC line intact to [facility].  9/12/18 Antibiotics completed. Culture results pending. 1 deep stitch remaining.  9/12/18: Culture positive for E. Coli, patient to continue Ertapenem IV antibiotics x 2weeks. [facility] notified  9/26/18: F/U abdominal wound, wound continues to improve. Patient will complete IV antibiotics today  10/3/18: patient c/o diarrhea for 2 days and nausea with some vomiting. Labs and stool culture ordered. IV antibiotics discontinued today  10/10/2018 patient will start on IV antibiotic Invaz IV once a day, pending PICC line placement, order placed today  for PICC per Dr. Horton.  10/24/18: patient on IV antibiotics, tolerating well. Continue for 1 week  11/7/2018: IV Antibiotic discontinued.  11/8/2018: PICC line to left upper arm discontinued by Home Health.  11/21/2018 F/u for abdominal wall fistula , c/o nausea  No vomiting , no fever  12/5/2018: F/u for abdominal wall fisula, c/o gas, Dr. Horton will order Bentyl. Surgery schedule for January 2019.  12/12/2018: F/u for abdominal wall fistula, c/o abdominal pain. Dr. Horton ordered Norco 5/325mg tab 1 PO every 4 hours as needed for pain and referred patient for x-ray of abdomen after clinic today.  12/19/2018: F/u for abdominal wall fisula, c/o nausea, Dr. Horton re ordered Ondansetron (Zofran) 8mg  one tab by mouth every eight hours as needed for nausea. No changes in wound condition from last visit noted in clinic today.  12/26/2018: F/u abdominal wall fistula drainage, no c/o at this time. Denies any abdominal pain or nausea. Dr. Horton recommends surgery first week of January 2019 for abdominal fistula treatment and possible colostomy placement, patient agree.  01/02/2019: F/u abdominal wall fistula. Dressing with large amount of drainage, malodorous, Dr. Horton is scheduling surgery for third week of January, 2019.  1/16/19: F/U Dr. Horton today in clinic, surgery scheduled for next Friday 1/25/19 with Dr. Horton.     01/23/2019: Presents to wound care clinic for f/u abdominal wound care tx. Surgery scheduled with Dr. Horton for Friday 01/25/2019. Dr. Horton explained Mrs. Morrison surgery procedure including possible complications, Nurse  (Georgian) present, patient verbalizes understanding of procedure including possible complications, all questions from patient were answered by Dr. Horton including blood sugar and blood pressure medications per patient's concerns. Consent for surgery and blood transfusion signed by patient, Dr. Horton and nurse witnessed.  Abdominal wound cultures collected per Dr. Horton, cultures sent to lab/micro pending results. Dr. Horton prescribed the following orders: fleet enema for Thursday 1/24/2019, clear liquid diet and not to eat after midnight all for 1/24/2019. Start taking Neomycin and erythromycin by mouth three times a day (1/24/2019) and dulcolax one tab by mouth twice a day, Mrs. Morrison voices understanding.     01/30/2019: F/U wound care treatment with Dr. Horton. Per pt, primary physician Dr. Pj Monae ordered for her to take potassium pills for three days, last day today and scheduled for lab work at primary physician clinic on 1/31/2019. Per pt. Feeling better, no more abdominal pain (went to ER 1/24/2019 and discharged 1/25/2019 c/o abd  "pain.). Dr. Horton awaiting on primary clearance to re-schedule surgery, per pt. She will call wound care clinic and Dr. Horton to make aware of clearance day. Continue with same orders for dressing change for Dr. Horton. Mrs. Morrison requesting gentamicin refill.     02/06/2019: F/u with Dr. Horton for wound care treatment. Mrs. Morrison brought to clinic a clearance for surgery by Dr. Pj Sanches dated 02/06/2019 "Hyperkalemia-Resolved K 4.8 2/1/2019, labs , Cr. 1.05, H/H 10/30. No contraindication to surgery, tight control of BS, Hydration." A copy of EKG (1/24/2019) and lab work from [employer] [facility] [employer] collected 1/31/2019, reported results 2/1/2019. Dr. Horton scheduled surgery for 02/22/2019, consent were signed on 01/23/2019, all questions were answered by Dr. Horton, this nurse  (Tristanian) present. Education provided to patient on the importance of having a clear liquid diet the day before surgery 02/21/2019 as per Dr. Horton, new medications and preop preparation before surgery, verbalizes understanding. Dr. Horton ordered Norco 5/325 mg PO for pain, Dulcolax two tabs by mouth to take on 02/21/2019, Soap suds enema (SSE) on Thursday 02/21/2019, Golytely by mouth 2 liters on 2/21/2019, Erythromycin 500 mg one tab by mouth three times a day and Neomycin 1 gm by mouth three times a day.     2/13/2019: Presents to wound care clinic for a f/u with Dr. Horton for wound care treatment. No new orders in wound dressing change, reviewed preop orders with Mrs. Morrison per Dr. Horton, all questions answered. Surgery scheduled for 02/22/2019.  2/20/19: Continues wound care a previously ordered.  Depth measured per Dr. Horton 8cm.  Preop orders reviewed with patient who verbalized understanding.  Surgery scheduled for Fri 2/22/19.  Rx given to patient for Norco and Zofran.    03/06/19: Patient admitted to [facility] on 02/22/19 for exploratory laparotomy of abdomen per Dr. Horton. Patient " "discharged on 02/28/19 with home health and PICC line with IV antibiotics. Staples removed today in clinic. Patient denies any fever, chills, n&v; however, she states that she has "low energy." Continued with wound care as ordered.     3/13/2019: F/U with Dr. Horton for wound care treatment. Continue with same wound dressing change orders as per Dr. Horton, orders followed. Home Health to continue wound dressing change and lab draw for CBC weekly; continue IV Ertapenem/Invaz 1 gm IV daily as ordered.     3/20/2019: Clinic visit with Dr. Horton for abdominal abscess treatment. Presents with moderate draining from abdominal wound; wound size decreasing per measurement. Per Dr. Horton, apply one-piece system ostomy bag to abdominal wound for drain collection, may drain bag when it fills and may change every other day, continue applying gentamicin to wound bed before applying ostomy bag. Continue IV Invaz until completion. If ostomy bag not effective for draining collection, may return to previous orders for wound dressing change. Orders followed. Education provided to Mrs. Morrison on handwashing and ostomy care techniques, voices and demonstrates understanding.   3/27/19: Follow up with Dr. Horton today in clinic for abdominal abscess, moderated tanish yellow drainge present on dressing.  Wound slowly improving, patient refused one-piece ostomy bag stated " no it's too messy." requested to return to previous dressing prior to ostomy bag placement- iodoform gauze, aquacel extra, sm abd, and mefix tape.  Silver nitrate x1 per Dr. Horton today in clinic. Rx given to patient for PO Zofran.     04/03/2019: F/u clinic visit with Dr. Horton. Abdominal wound improving in size, picture on file. Wound cultures from abdominal abscess collected and sent to lab/micro, pending results. Mrs. Morrison states going out of the country ([place]) at the end of April and plans to stay there for approximately two months. Dr." Clifford ordered IV antibiotic for two more weeks and new wound care dressing, orders followed.     4/9/2019: Clinic visit with Dr. Horton.  Per Dr. Horton, wound deep measurement increased, draining present. Wound cultures from abdominal wound taken and sent to lab/micro, pending results. C/o abdominal pain and itching around wound, Rx for betamethasone cream 01.% and Norco 5/325mg give in clinic by Dr. Horton. Wound care dressing orders followed. Continue IV antibiotic as previously ordered.  04/17/19: F/u for non-healing wound to abdomen. Culture report negative. Dr. Horton ordered 1 more week of IV antibiotics. Continue with topical wound care as ordered.     4/24/2019: Clinic visit with Dr. Horton for abdominal wound treatment. Wound improvement present, pictures on file. Last IV antibiotic today 4/24/2019, [facility] to discontinue PICC line from left upper arm on 4/25/2019. Per Lyubov Prince, going to [place] on Friday 4/26/2019 and out of the country ([place]) on Monday 4/29/2019. This nurse spoke to Shaunna at [employer] (IV antibiotic delivery company) and informed last dose of antibiotic is today. Dr. Horton ordered Hydrocodone-acetaminophen (Norco) 5-325 mg (32 tabs), Bentyl 10 mg (120 capsules) and Gentamycin ointment. Education provided on the importance of eating food that contains iron (to prevent anemia) as well as eating meat and taking her blood pressure medications (blood pressure medication) on time, voices understanding. All questions answered by Dr. Horton. [facility] outpatient confirmed Betamethasone cream ordered on 4/10/2019 is ready for . Instructions and education provided to Mrs. Morrison on abdominal wound dressing changes, hand washing techniques and medication use, effects and side effects, voices and demonstrates understanding. Discharged home on stable conditions, [facility] will give wound care clinic a call when she is back from [place] in few months from  now.     7/17/19: Readmit today to wound care clinic.  Patient was recently out of the country visiting family in [place].  Patient complains of pain and nausea as on prior visits.  Dr. Horton seen patient today discussed with patient again placing colostomy patient refused.  Culture of wound taken, Dr. Horton restarted patient on zofran po for nausea, and Norco for pain. [facility] restarted today in clinic on Mondays and Fridays and prn. Orders given to gently pack wound with aquacel ag rope, cover with aquacel extra, 4x4 gauze, small abd pad and mefix tape change dressing ever other day by [facility] and Prn per patient.     7/24/2019: Clinic visit with Dr. Horton. Continue with dressing change as ordered. Wound cultures reviewed with patient, lab work ordered by Dr. Horton, no fever present or reported at this time. C/o decrease appetite, medication periactin prescribed. Requesting needles for insulin pen, order prescribed by Dr. Horton.      7/31/2019: F/U clinic visit with Dr. Horton. C/o of excruciated back pain 10/10, not feeling well, lot of gas and left upper quadrant discomfort. New orders prescribed: Augmentin 875-125 mg by mouth twice a day. Bentyl 10 mg one tab by mouth 4 times a day. Tramadol 50 mg one tab by mouth every 6 hours as needed for pain. Abdomen ultrasound. Wound care dressing completed as ordered.     8/7/2019: Clinic visit with Dr. Horton, denies any pain at this time. Wound care dressing done as ordered, no changes in wound size from last visit noted. Continue taking oral antiiotic as ordered, pending abdominal ultrasound, scheduled for 8/13/2019.     8/14/2019: F/U clinic visit with Dr. Horton. Admitted and discharged from emergency department this morning with abdominal pain, CT and ultrasound in filed. Dr. Horton performed a small drainage from the abdominal wound, moderate amount of creamy, serosanguineous fluid from abdominal wound present. Iodoform gauze packed  into her wound, dressing changed as ordered. Wound cultures collected and sent to lab/micro, pending results. Rx for Norco 5/325 mg one tab PO every 4 hours PRN as needed # 24 given to pt by Dr. Horton.      8/21/2019: Clinic visit with Dr. Horton. Wound dressing changed as ordered.      8/28/2019: Clinic visit with Dr. Horton. Continue with dressing change as ordered.      09/04/19: F/u wound care visit with Dr. Horton. Continue current wound care treatment. CBC and CMP ordered today    9/11/2019: Clinic visit with Dr. Horton. Continue with PO antibiotics, and wound dressing changed as ordered. New medication prescribed for arthritis, and pain, RX provided.      Review of Systems   Constitutional: Negative.    HENT: Negative.    Eyes: Negative.    Respiratory: Negative.    Cardiovascular: Negative.    Gastrointestinal: Negative.    Genitourinary: Negative.    Musculoskeletal: Negative.    Skin: Negative.    Neurological: Negative.    Psychiatric/Behavioral: Negative.          Objective:        Physical Exam   Constitutional: She is oriented to person, place, and time. She appears well-developed and well-nourished.   HENT:   Head: Normocephalic.   Eyes: Pupils are equal, round, and reactive to light. Conjunctivae and EOM are normal.   Neck: Normal range of motion. Neck supple.   Cardiovascular: Normal rate, regular rhythm, normal heart sounds and intact distal pulses.   Pulmonary/Chest: Effort normal and breath sounds normal.   Abdominal: Soft. Bowel sounds are normal.   Musculoskeletal: Normal range of motion.   Neurological: She is alert and oriented to person, place, and time. She has normal reflexes.   Skin: Skin is warm and dry.       Vitals:    09/11/19 0852   BP: (!) 147/83   Pulse: 90   Temp: 97 °F (36.1 °C)       Assessment:           ICD-10-CM ICD-9-CM   1. Colocutaneous fistula K63.2 569.81   2. Infection due to ESBL-producing Escherichia coli A49.8 041.49    Z16.12 V09.1   3. Abdominal wall abscess  L02.211 682.2   4. 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, unspecified whether long term insulin use E11.622 250.80    L98.499 707.9            Incision/Site 02/22/19 1146 Abdomen midline midline (Active)   02/22/19 1146    Present Prior to Hospital Arrival?:    Side:    Location: Abdomen   Orientation: midline   Incision Type: midline   Closure Method: Staples   Additional Comments:    Removal Indication and Assessment:    Wound Outcome:    Removal Indications:    Wound Image   9/11/2019  9:00 AM   Dressing Appearance Intact;Moist drainage 9/11/2019  9:00 AM   Drainage Amount Moderate 9/11/2019  9:00 AM   Drainage Characteristics/Odor Serosanguineous 9/11/2019  9:00 AM   Appearance Red;Fibrin;Pink 9/11/2019  9:00 AM   Red (%), Wound Tissue Color 100 % 9/11/2019  9:00 AM   Periwound Area Intact 9/11/2019  9:00 AM   Wound Edges Defined 9/11/2019  9:00 AM   Wound Length (cm) 0.7 cm 9/11/2019  9:00 AM   Wound Width (cm) 0.8 cm 9/11/2019  9:00 AM   Wound Depth (cm) 7 cm 9/11/2019  9:00 AM   Wound Volume (cm^3) 3.92 cm^3 9/11/2019  9:00 AM   Wound Surface Area (cm^2) 0.56 cm^2 9/11/2019  9:00 AM   Undermining (depth (cm)/location) 3.5 from 11:00-1:00 9/11/2019  9:00 AM   Care Cleansed with:;Sterile normal saline 9/11/2019  9:00 AM   Dressing Applied 9/11/2019  9:00 AM   Packing gauze, iodoform 9/11/2019  9:00 AM   Periwound Care Skin barrier film applied 9/11/2019  9:00 AM   Dressing Change Due 09/13/19 9/11/2019  9:00 AM         Clean wound with normal saline  Lidocaine 2% gel or 4 % Topical solution: PRN  Other: Cavilon to chauncey wound  Primary dressing: Apply gentamycin to wound bed. Pack wound with Iodoform gauze, cover with 4x4 gauze, small ABD pad, secure with mefix tape.  Frequency: Mondays and Fridays by [facility] and on Wednesdays at wound care clinic.    Plan:                  Follow up in about 1 week (around 9/18/2019).  Other orders: Continue po antibiotics.

## 2019-09-18 ENCOUNTER — HOSPITAL ENCOUNTER (OUTPATIENT)
Dept: WOUND CARE | Facility: HOSPITAL | Age: 69
Discharge: HOME | End: 2019-09-18
Attending: SURGERY
Payer: MEDICARE

## 2019-09-18 VITALS
TEMPERATURE: 98 F | HEIGHT: 62 IN | BODY MASS INDEX: 30.91 KG/M2 | HEART RATE: 77 BPM | DIASTOLIC BLOOD PRESSURE: 78 MMHG | SYSTOLIC BLOOD PRESSURE: 144 MMHG | WEIGHT: 168 LBS

## 2019-09-18 DIAGNOSIS — L02.211 ABDOMINAL WALL ABSCESS: Primary | ICD-10-CM

## 2019-09-18 DIAGNOSIS — E11.9 TYPE 2 DIABETES MELLITUS WITHOUT COMPLICATION, WITHOUT LONG-TERM CURRENT USE OF INSULIN: ICD-10-CM

## 2019-09-18 DIAGNOSIS — I10 ESSENTIAL HYPERTENSION: ICD-10-CM

## 2019-09-18 DIAGNOSIS — K63.2 COLOCUTANEOUS FISTULA: ICD-10-CM

## 2019-09-18 DIAGNOSIS — L02.211 ABSCESS OF ABDOMINAL WALL: ICD-10-CM

## 2019-09-18 PROCEDURE — 99212 OFFICE O/P EST SF 10 MIN: CPT

## 2019-09-18 NOTE — PROGRESS NOTES
"[facility]                Progress Note    Subjective:       Patient ID: Azucena Morrison is a 69 y.o. female.    Chief Complaint: Non-healing Wound    /29/17: Pt re-admit for distal abdominal wound. Pt denies any fevers or chills but states she feels nauseous at times. Returned to [place] June 28, from [place], reports much trouble with abdominal wound while in [place].  7/6/17-- Patient scheduled for surgical drainage of wound Tuesday 7/11/17DB  7/19/17-- Patient post op clinic visit.  8/16/17: CT of abdomen and pelvis done 8/9/17 due to suspected fistula  8/23/17-- U/S of abdomen done today.  12/13/17 Wound vac with 20cc drainage in clinic today.  1/10/18: on PO abx  1/24/18: Fistulagram ordered per Dr. Horton. Patient still on PO abx  02/21/18 Patient is not on antibiotics at this time. BS fasting 135 per patient report this am.  03/07/18 Culture of Anterior Abdominal Wound is positive. No antibiotics at this time.  fasting per patient report.    03/21/18 Patient c/o nausea along with abdominal tenderness near abd midline wound. Patient states that pain level is 6 and that she passed two sero-sanguineous clots from wound. Patient is afebrile this am.  3/28/18: patient continue antibiotics and reports that pain is less than last weeks clinic visit. Drainage has decreased as well  04/04/18 Patient states that abdominal pain is "pressure" sensation and that when she pushes down on her abdomen on either side of abdominal wound she feels like she has to urinate. Patient reports pain level of 3 this am.  fasting this am per patient report.   5/2/18: Patient had Abdomina Toribio today before wound care. She had discontinued Bactrim PO per Dr. Horton's recommendation and culture results and is now taking Amoxicillin PO  6/20/18: Pt reports itching to wound and periwound   6/27/18: patient reports no new complaints with regards to her wound or abdomen, wound " continues to decrease in size and drainage  8/1/18: Pt reports increased pain to abdomen with nausea and emesis since thursday 7/26/18, denies any fevers, patient did no go to ER as instructed by home health nurse on Sat. 7/28/18.  8/29/18: Patient admitted to hospital 8/2 for abdominal wound complications. Surgery for abdominal abscess per Dr. Horton on 8/3. Patient placed on IV antibiotics and discharged home on 8/18 with [facility]  and [facility] line to [facility]. Patient currently on IV ertapenem. IV medication sent to [facility] home per [facility].   9/5/18: IV Ertapenem to continue 1 week. Culture sent to lab. [facility]  sent orders. PICC line intact to [facility].  9/12/18 Antibiotics completed. Culture results pending. 1 deep stitch remaining.  9/12/18: Culture positive for E. Coli, patient to continue Ertapenem IV antibiotics x 2weeks. [facility] notified  9/26/18: F/U abdominal wound, wound continues to improve. Patient will complete IV antibiotics today  10/3/18: patient c/o diarrhea for 2 days and nausea with some vomiting. Labs and stool culture ordered. IV antibiotics discontinued today  10/10/2018 patient will start on IV antibiotic Invaz IV once a day, pending PICC line placement, order placed today  for PICC per Dr. Horton.  10/24/18: patient on IV antibiotics, tolerating well. Continue for 1 week  11/7/2018: IV Antibiotic discontinued.  11/8/2018: PICC line to left upper arm discontinued by Home Health.  11/21/2018 F/u for abdominal wall fistula , c/o nausea  No vomiting , no fever  12/5/2018: F/u for abdominal wall fisula, c/o gas, Dr. Horton will order Bentyl. Surgery schedule for January 2019.  12/12/2018: F/u for abdominal wall fistula, c/o abdominal pain. Dr. Horton ordered Norco 5/325mg tab 1 PO every 4 hours as needed for pain and referred patient for x-ray of abdomen after clinic today.  12/19/2018: F/u for abdominal wall fisula, c/o nausea, Dr. Horton re ordered Ondansetron (Zofran) 8mg  one tab by mouth every eight hours as needed for nausea. No changes in wound condition from last visit noted in clinic today.  12/26/2018: F/u abdominal wall fistula drainage, no c/o at this time. Denies any abdominal pain or nausea. Dr. Horton recommends surgery first week of January 2019 for abdominal fistula treatment and possible colostomy placement, patient agree.  01/02/2019: F/u abdominal wall fistula. Dressing with large amount of drainage, malodorous, Dr. Horton is scheduling surgery for third week of January, 2019.  1/16/19: F/U Dr. Horton today in clinic, surgery scheduled for next Friday 1/25/19 with Dr. Horton.     01/23/2019: Presents to wound care clinic for f/u abdominal wound care tx. Surgery scheduled with Dr. Horton for Friday 01/25/2019. Dr. Horton explained Mrs. Morrison surgery procedure including possible complications, Nurse  (Sami) present, patient verbalizes understanding of procedure including possible complications, all questions from patient were answered by Dr. Horton including blood sugar and blood pressure medications per patient's concerns. Consent for surgery and blood transfusion signed by patient, Dr. Horton and nurse witnessed.  Abdominal wound cultures collected per Dr. Horton, cultures sent to lab/micro pending results. Dr. Horton prescribed the following orders: fleet enema for Thursday 1/24/2019, clear liquid diet and not to eat after midnight all for 1/24/2019. Start taking Neomycin and erythromycin by mouth three times a day (1/24/2019) and dulcolax one tab by mouth twice a day, Mrs. Morrison voices understanding.     01/30/2019: F/U wound care treatment with Dr. Horton. Per pt, primary physician Dr. Pj Monae ordered for her to take potassium pills for three days, last day today and scheduled for lab work at primary physician clinic on 1/31/2019. Per pt. Feeling better, no more abdominal pain (went to ER 1/24/2019 and discharged 1/25/2019 c/o abd  "pain.). Dr. Horton awaiting on primary clearance to re-schedule surgery, per pt. She will call wound care clinic and Dr. Horton to make aware of clearance day. Continue with same orders for dressing change for Dr. Horton. Mrs. Morrison requesting gentamicin refill.     02/06/2019: F/u with Dr. Horton for wound care treatment. Mrs. Morrison brought to clinic a clearance for surgery by Dr. Pj Sanches dated 02/06/2019 "Hyperkalemia-Resolved K 4.8 2/1/2019, labs , Cr. 1.05, H/H 10/30. No contraindication to surgery, tight control of BS, Hydration." A copy of EKG (1/24/2019) and lab work from [facility] collected 1/31/2019, reported results 2/1/2019. Dr. Horton scheduled surgery for 02/22/2019, consent were signed on 01/23/2019, all questions were answered by Dr. Horton, this nurse  (Slovenian) present. Education provided to patient on the importance of having a clear liquid diet the day before surgery 02/21/2019 as per Dr. Horton, new medications and preop preparation before surgery, verbalizes understanding. Dr. Horton ordered Norco 5/325 mg PO for pain, Dulcolax two tabs by mouth to take on 02/21/2019, Soap suds enema (SSE) on Thursday 02/21/2019, Golytely by mouth 2 liters on 2/21/2019, Erythromycin 500 mg one tab by mouth three times a day and Neomycin 1 gm by mouth three times a day.     2/13/2019: Presents to wound care clinic for a f/u with Dr. Horton for wound care treatment. No new orders in wound dressing change, reviewed preop orders with Mrs. Morrison per Dr. Horton, all questions answered. Surgery scheduled for 02/22/2019.  2/20/19: Continues wound care a previously ordered.  Depth measured per Dr. Horton 8cm.  Preop orders reviewed with patient who verbalized understanding.  Surgery scheduled for Fri 2/22/19.  Rx given to patient for Norco and Zofran.    03/06/19: Patient admitted to [facility] on 02/22/19 for exploratory laparotomy of abdomen per Dr. Horton. Patient " "discharged on 02/28/19 with home health and PICC line with IV antibiotics. Staples removed today in clinic. Patient denies any fever, chills, n&v; however, she states that she has "low energy." Continued with wound care as ordered.     3/13/2019: F/U with Dr. Horton for wound care treatment. Continue with same wound dressing change orders as per Dr. Horton, orders followed. Home Health to continue wound dressing change and lab draw for CBC weekly; continue IV Ertapenem/Invaz 1 gm IV daily as ordered.     3/20/2019: Clinic visit with Dr. Horton for abdominal abscess treatment. Presents with moderate draining from abdominal wound; wound size decreasing per measurement. Per Dr. Horton, apply one-piece system ostomy bag to abdominal wound for drain collection, may drain bag when it fills and may change every other day, continue applying gentamicin to wound bed before applying ostomy bag. Continue IV Invaz until completion. If ostomy bag not effective for draining collection, may return to previous orders for wound dressing change. Orders followed. Education provided to Mrs. Morrison on handwashing and ostomy care techniques, voices and demonstrates understanding.   3/27/19: Follow up with Dr. Horton today in clinic for abdominal abscess, moderated tanish yellow drainge present on dressing.  Wound slowly improving, patient refused one-piece ostomy bag stated " no it's too messy." requested to return to previous dressing prior to ostomy bag placement- iodoform gauze, aquacel extra, sm abd, and mefix tape.  Silver nitrate x1 per Dr. Horton today in clinic. Rx given to patient for PO Zofran.     04/03/2019: F/u clinic visit with Dr. Horton. Abdominal wound improving in size, picture on file. Wound cultures from abdominal abscess collected and sent to lab/micro, pending results. Mrs. Morrison states going out of the country ([place]) at the end of April and plans to stay there for approximately two months. Dr." Clifford ordered IV antibiotic for two more weeks and new wound care dressing, orders followed.     4/9/2019: Clinic visit with Dr. Horton.  Per Dr. Horton, wound deep measurement increased, draining present. Wound cultures from abdominal wound taken and sent to lab/micro, pending results. C/o abdominal pain and itching around wound, Rx for betamethasone cream 01.% and Norco 5/325mg give in clinic by Dr. Horton. Wound care dressing orders followed. Continue IV antibiotic as previously ordered.  04/17/19: F/u for non-healing wound to abdomen. Culture report negative. Dr. Horton ordered 1 more week of IV antibiotics. Continue with topical wound care as ordered.     4/24/2019: Clinic visit with Dr. Horton for abdominal wound treatment. Wound improvement present, pictures on file. Last IV antibiotic today 4/24/2019, [facility] to discontinue PICC line from left upper arm on 4/25/2019. Per Lyubov Prince, going to [place] on Friday 4/26/2019 and out of the country ([place]) on Monday 4/29/2019. This nurse spoke to Shaunna at [employer] ([employer] delivery [employer]) and informed last dose of antibiotic is today. Dr. Horton ordered Hydrocodone-acetaminophen (Norco) 5-325 mg (32 tabs), Bentyl 10 mg (120 capsules) and Gentamycin ointment. Education provided on the importance of eating food that contains iron (to prevent anemia) as well as eating meat and taking her blood pressure medications (blood pressure medication) on time, voices understanding. All questions answered by Dr. Horton. [facility] outpatient confirmed Betamethasone cream ordered on 4/10/2019 is ready for . Instructions and education provided to Mrs. Morrison on abdominal wound dressing changes, hand washing techniques and medication use, effects and side effects, voices and demonstrates understanding. Discharged home on stable conditions, [facility] will give wound care clinic a call when she is back from [place] in few months from  now.     7/17/19: Readmit today to wound care clinic.  Patient was recently out of the country visiting family in [place].  Patient complains of pain and nausea as on prior visits.  Dr. Horton seen patient today discussed with patient again placing colostomy patient refused.  Culture of wound taken, Dr. Horton restarted patient on zofran po for nausea, and Norco for pain. [facility] restarted today in clinic on Mondays and Fridays and prn. Orders given to gently pack wound with aquacel ag rope, cover with aquacel extra, 4x4 gauze, small abd pad and mefix tape change dressing ever other day by [facility] and Prn per patient.     7/24/2019: Clinic visit with Dr. Horton. Continue with dressing change as ordered. Wound cultures reviewed with patient, lab work ordered by Dr. Horton, no fever present or reported at this time. C/o decrease appetite, medication periactin prescribed. Requesting needles for insulin pen, order prescribed by Dr. Horton.      7/31/2019: F/U clinic visit with Dr. Horton. C/o of excruciated back pain 10/10, not feeling well, lot of gas and left upper quadrant discomfort. New orders prescribed: Augmentin 875-125 mg by mouth twice a day. Bentyl 10 mg one tab by mouth 4 times a day. Tramadol 50 mg one tab by mouth every 6 hours as needed for pain. Abdomen ultrasound. Wound care dressing completed as ordered.     8/7/2019: Clinic visit with Dr. Horton, denies any pain at this time. Wound care dressing done as ordered, no changes in wound size from last visit noted. Continue taking oral antiiotic as ordered, pending abdominal ultrasound, scheduled for 8/13/2019.     8/14/2019: F/U clinic visit with Dr. Horton. Admitted and discharged from emergency department this morning with abdominal pain, CT and ultrasound in filed. Dr. Horton performed a small drainage from the abdominal wound, moderate amount of creamy, serosanguineous fluid from abdominal wound present. Iodoform gauze packed  into her wound, dressing changed as ordered. Wound cultures collected and sent to lab/micro, pending results. Rx for Norco 5/325 mg one tab PO every 4 hours PRN as needed # 24 given to pt by Dr. Horton.      8/21/2019: Clinic visit with Dr. Horton. Wound dressing changed as ordered.      8/28/2019: Clinic visit with Dr. Horton. Continue with dressing change as ordered.      09/04/19: F/u wound care visit with Dr. Horton. Continue current wound care treatment. CBC and CMP ordered today     9/11/2019: Clinic visit with Dr. Horton. Continue with PO antibiotics, and wound dressing changed as ordered. New medication prescribed for arthritis, and pain, RX provided.    9/18/2019: Clinic visit with Dr. Horton. Wound dressing changed as ordered. Silver nitrate x 1 used by Dr. Horton, tolerated.      Review of Systems   Constitutional: Negative.    HENT: Negative.    Eyes: Negative.    Respiratory: Negative.    Cardiovascular: Negative.    Gastrointestinal: Negative.    Genitourinary: Negative.    Musculoskeletal: Negative.    Skin: Negative.    Neurological: Negative.    Psychiatric/Behavioral: Negative.          Objective:        Physical Exam   Constitutional: She is oriented to person, place, and time. She appears well-developed and well-nourished.   HENT:   Head: Normocephalic.   Eyes: Pupils are equal, round, and reactive to light. Conjunctivae and EOM are normal.   Neck: Normal range of motion. Neck supple.   Cardiovascular: Normal rate, regular rhythm, normal heart sounds and intact distal pulses.   Pulmonary/Chest: Effort normal and breath sounds normal.   Abdominal: Soft. Bowel sounds are normal.   Musculoskeletal: Normal range of motion.   Neurological: She is alert and oriented to person, place, and time. She has normal reflexes.   Skin: Skin is warm and dry.       There were no vitals filed for this visit.    Assessment:           ICD-10-CM ICD-9-CM   1. Abdominal wall abscess L02.211 682.2             Incision/Site 02/22/19 1146 Abdomen midline midline (Active)   02/22/19 1146    Present Prior to Hospital Arrival?:    Side:    Location: Abdomen   Orientation: midline   Incision Type: midline   Closure Method: Staples   Additional Comments:    Removal Indication and Assessment:    Wound Outcome:    Removal Indications:    Dressing Appearance Intact;Moist drainage 9/18/2019  8:00 AM   Drainage Amount Moderate 9/18/2019  8:00 AM   Drainage Characteristics/Odor Serosanguineous 9/18/2019  8:00 AM   Appearance Red;Fibrin;Pink 9/18/2019  8:00 AM   Red (%), Wound Tissue Color 100 % 9/18/2019  8:00 AM   Periwound Area Intact 9/18/2019  8:00 AM   Wound Edges Defined 9/18/2019  8:00 AM   Wound Length (cm) 0.7 cm 9/18/2019  8:00 AM   Wound Width (cm) 0.8 cm 9/18/2019  8:00 AM   Wound Depth (cm) 7 cm 9/18/2019  8:00 AM   Wound Volume (cm^3) 3.92 cm^3 9/18/2019  8:00 AM   Wound Surface Area (cm^2) 0.56 cm^2 9/18/2019  8:00 AM   Undermining (depth (cm)/location) 3.5 cm from 11-1 o'clock 9/18/2019  8:00 AM   Care Cleansed with:;Sterile normal saline 9/18/2019  8:00 AM   Dressing Applied 9/18/2019  8:00 AM   Periwound Care Skin barrier film applied 9/18/2019  8:00 AM   Dressing Change Due 09/20/19 9/18/2019  8:00 AM         Clean wound with normal saline  Lidocaine 2% gel or 4 % Topical solution: PRN  Other: Cavilon to chauncey wound  Primary dressing: Apply gentamycin to wound bed. Pack wound with Iodoform gauze, cover with 4x4 gauze, small ABD pad, secure with mefix tape.  Frequency: Mondays and Fridays by [facility] and on Wednesdays at wound care clinic.    Plan:                  Follow up in about 1 week (around 9/25/2019).

## 2019-09-23 ENCOUNTER — EXTERNAL HOME HEALTH (OUTPATIENT)
Dept: HOME HEALTH SERVICES | Facility: HOSPITAL | Age: 69
End: 2019-09-23

## 2019-09-25 ENCOUNTER — HOSPITAL ENCOUNTER (OUTPATIENT)
Dept: WOUND CARE | Facility: HOSPITAL | Age: 69
Discharge: HOME | End: 2019-09-25
Attending: SURGERY
Payer: MEDICARE

## 2019-09-25 VITALS
TEMPERATURE: 99 F | DIASTOLIC BLOOD PRESSURE: 72 MMHG | HEIGHT: 62 IN | SYSTOLIC BLOOD PRESSURE: 134 MMHG | HEART RATE: 82 BPM | BODY MASS INDEX: 30.91 KG/M2 | WEIGHT: 168 LBS

## 2019-09-25 DIAGNOSIS — K63.2 COLOCUTANEOUS FISTULA: ICD-10-CM

## 2019-09-25 DIAGNOSIS — E11.9 TYPE 2 DIABETES MELLITUS WITHOUT COMPLICATION, WITHOUT LONG-TERM CURRENT USE OF INSULIN: ICD-10-CM

## 2019-09-25 DIAGNOSIS — L02.211 ABDOMINAL WALL ABSCESS: Primary | ICD-10-CM

## 2019-09-25 PROCEDURE — 99213 OFFICE O/P EST LOW 20 MIN: CPT

## 2019-09-25 RX ORDER — ONDANSETRON 4 MG/1
8 TABLET, FILM COATED ORAL EVERY 8 HOURS PRN
Qty: 30 TABLET | Refills: 1 | Status: ON HOLD | OUTPATIENT
Start: 2019-09-25 | End: 2019-12-24 | Stop reason: HOSPADM

## 2019-09-25 NOTE — PROGRESS NOTES
"Subjective:       Patient ID: Azucena Morrison is a 69 y.o. female.    Chief Complaint: Non-healing Wound Follow Up /29/17: Pt re-admit for distal abdominal wound. Pt denies any fevers or chills but states she feels nauseous at times. Returned to [place] June 28, from [place], reports much trouble with abdominal wound while in [place].  7/6/17-- Patient scheduled for surgical drainage of wound Tuesday 7/11/17DB  7/19/17-- Patient post op clinic visit.  8/16/17: CT of abdomen and pelvis done 8/9/17 due to suspected fistula  8/23/17-- U/S of abdomen done today.  12/13/17 Wound vac with 20cc drainage in clinic today.  1/10/18: on PO abx  1/24/18: Fistulagram ordered per Dr. Horton. Patient still on PO abx  02/21/18 Patient is not on antibiotics at this time. BS fasting 135 per patient report this am.  03/07/18 Culture of Anterior Abdominal Wound is positive. No antibiotics at this time.  fasting per patient report.    03/21/18 Patient c/o nausea along with abdominal tenderness near abd midline wound. Patient states that pain level is 6 and that she passed two sero-sanguineous clots from wound. Patient is afebrile this am.  3/28/18: patient continue antibiotics and reports that pain is less than last weeks clinic visit. Drainage has decreased as well  04/04/18 Patient states that abdominal pain is "pressure" sensation and that when she pushes down on her abdomen on either side of abdominal wound she feels like she has to urinate. Patient reports pain level of 3 this am.  fasting this am per patient report.   5/2/18: Patient had Abdomina Toribio today before wound care. She had discontinued Bactrim PO per Dr. Horton's recommendation and culture results and is now taking Amoxicillin PO  6/20/18: Pt reports itching to wound and periwound   6/27/18: patient reports no new complaints with regards to her wound or abdomen, wound continues to decrease in size and drainage  8/1/18: Pt reports increased pain to abdomen " with nausea and emesis since thursday 7/26/18, denies any fevers, patient did no go to ER as instructed by home health nurse on Sat. 7/28/18.  8/29/18: Patient admitted to hospital 8/2 for abdominal wound complications. Surgery for abdominal abscess per Dr. Horton on 8/3. Patient placed on IV antibiotics and discharged home on 8/18 with [facility]  and PICC line to LUE. Patient currently on IV ertapenem. IV medication sent to patients home per [facility].   9/5/18: IV Ertapenem to continue 1 week. Culture sent to lab. [facility]  sent orders. PICC line intact to E.  9/12/18 Antibiotics completed. Culture results pending. 1 deep stitch remaining.  9/12/18: Culture positive for E. Coli, patient to continue Ertapenem IV antibiotics x 2weeks. [facility] notified  9/26/18: F/U abdominal wound, wound continues to improve. Patient will complete IV antibiotics today  10/3/18: patient c/o diarrhea for 2 days and nausea with some vomiting. Labs and stool culture ordered. IV antibiotics discontinued today  10/10/2018 patient will start on IV antibiotic Invaz IV once a day, pending PICC line placement, order placed today  for PICC per Dr. Horton.  10/24/18: patient on IV antibiotics, tolerating well. Continue for 1 week  11/7/2018: IV Antibiotic discontinued.  11/8/2018: PICC line to left upper arm discontinued by Home Health.  11/21/2018 F/u for abdominal wall fistula , c/o nausea  No vomiting , no fever  12/5/2018: F/u for abdominal wall fisula, c/o gas, Dr. Horton will order Bentyl. Surgery schedule for January 2019.  12/12/2018: F/u for abdominal wall fistula, c/o abdominal pain. Dr. Horton ordered Norco 5/325mg tab 1 PO every 4 hours as needed for pain and referred patient for x-ray of abdomen after clinic today.  12/19/2018: F/u for abdominal wall fisula, c/o nausea, Dr. Horton re ordered Ondansetron (Zofran) 8mg one tab by mouth every eight hours as needed for nausea. No changes in wound condition  from last visit noted in clinic today.  12/26/2018: F/u abdominal wall fistula drainage, no c/o at this time. Denies any abdominal pain or nausea. Dr. Horton recommends surgery first week of January 2019 for abdominal fistula treatment and possible colostomy placement, patient agree.  01/02/2019: F/u abdominal wall fistula. Dressing with large amount of drainage, malodorous, Dr. Horton is scheduling surgery for third week of January, 2019.  1/16/19: F/U Dr. Horton today in clinic, surgery scheduled for next Friday 1/25/19 with Dr. Horton.     01/23/2019: Presents to wound care clinic for f/u abdominal wound care tx. Surgery scheduled with Dr. Horton for Friday 01/25/2019. Dr. Horton explained Mrs. Morrison surgery procedure including possible complications, Nurse  (Upper sor[place]) present, patient verbalizes understanding of procedure including possible complications, all questions from patient were answered by Dr. Horton including blood sugar and blood pressure medications per patient's concerns. Consent for surgery and blood transfusion signed by patient, Dr. Horton and nurse witnessed.  Abdominal wound cultures collected per Dr. Horton, cultures sent to lab/micro pending results. Dr. Horton prescribed the following orders: fleet enema for Thursday 1/24/2019, clear liquid diet and not to eat after midnight all for 1/24/2019. Start taking Neomycin and erythromycin by mouth three times a day (1/24/2019) and dulcolax one tab by mouth twice a day, Mrs. Morrison voices understanding.     01/30/2019: F/U wound care treatment with Dr. Horton. Per pt, primary physician Dr. Pj Monae ordered for her to take potassium pills for three days, last day today and scheduled for lab work at primary physician clinic on 1/31/2019. Per pt. Feeling better, no more abdominal pain (went to ER 1/24/2019 and discharged 1/25/2019 c/o abd pain.). Dr. Horton awaiting on primary clearance to re-schedule surgery, per pt. She  "will call wound care clinic and Dr. Horton to make aware of clearance day. Continue with same orders for dressing change for Dr. Horton. Mrs. Morrison requesting gentamicin refill.     02/06/2019: F/u with Dr. Horton for wound care treatment. Mrs. Morrison brought to clinic a clearance for surgery by Dr. Pj Sanches dated 02/06/2019 "Hyperkalemia-Resolved K 4.8 2/1/2019, labs , Cr. 1.05, H/H 10/30. No contraindication to surgery, tight control of BS, Hydration." A copy of EKG (1/24/2019) and lab work from [facility] collected 1/31/2019, reported results 2/1/2019. Dr. Horton scheduled surgery for 02/22/2019, consent were signed on 01/23/2019, all questions were answered by Dr. Horton, this nurse  ([place]) present. Education provided to patient on the importance of having a clear liquid diet the day before surgery 02/21/2019 as per Dr. Horton, new medications and preop preparation before surgery, verbalizes understanding. Dr. Horton ordered Norco 5/325 mg PO for pain, Dulcolax two tabs by mouth to take on 02/21/2019, Soap suds enema (SSE) on Thursday 02/21/2019, Golytely by mouth 2 liters on 2/21/2019, Erythromycin 500 mg one tab by mouth three times a day and Neomycin 1 gm by mouth three times a day.     2/13/2019: Presents to wound care clinic for a f/u with Dr. Horton for wound care treatment. No new orders in wound dressing change, reviewed preop orders with Mrs. Morrison per Dr. Horton, all questions answered. Surgery scheduled for 02/22/2019.  2/20/19: Continues wound care a previously ordered.  Depth measured per Dr. Horton 8cm.  Preop orders reviewed with patient who verbalized understanding.  Surgery scheduled for Fri 2/22/19.  Rx given to patient for Norco and Zofran.    03/06/19: Patient admitted to [facility] on 02/22/19 for exploratory laparotomy of abdomen per Dr. Horton. Patient discharged on 02/28/19 with home health and PICC line with IV antibiotics. Staples " "removed today in clinic. Patient denies any fever, chills, n&v; however, she states that she has "low energy." Continued with wound care as ordered.     3/13/2019: F/U with Dr. Horton for wound care treatment. Continue with same wound dressing change orders as per Dr. Horton, orders followed. Home Health to continue wound dressing change and lab draw for CBC weekly; continue IV Ertapenem/Invaz 1 gm IV daily as ordered.     3/20/2019: Clinic visit with Dr. Horton for abdominal abscess treatment. Presents with moderate draining from abdominal wound; wound size decreasing per measurement. Per Dr. Horton, apply one-piece system ostomy bag to abdominal wound for drain collection, may drain bag when it fills and may change every other day, continue applying gentamicin to wound bed before applying ostomy bag. Continue IV Invaz until completion. If ostomy bag not effective for draining collection, may return to previous orders for wound dressing change. Orders followed. Education provided to Mrs. Morrison on handwashing and ostomy care techniques, voices and demonstrates understanding.   3/27/19: Follow up with Dr. Horton today in clinic for abdominal abscess, moderated tanish yellow drainge present on dressing.  Wound slowly improving, patient refused one-piece ostomy bag stated " no it's too messy." requested to return to previous dressing prior to ostomy bag placement- iodoform gauze, aquacel extra, sm abd, and mefix tape.  Silver nitrate x1 per Dr. Horton today in clinic. Rx given to patient for PO Zofran.     04/03/2019: F/u clinic visit with Dr. Horton. Abdominal wound improving in size, picture on file. Wound cultures from abdominal abscess collected and sent to lab/micro, pending results. Mrs. Morrison states going out of the country ([place]) at the end of April and plans to stay there for approximately two months. Dr. Horton ordered IV antibiotic for two more weeks and new wound care dressing, orders " followed.     4/9/2019: Clinic visit with Dr. Horton.  Per Dr. Horton, wound deep measurement increased, draining present. Wound cultures from abdominal wound taken and sent to lab/micro, pending results. C/o abdominal pain and itching around wound, Rx for betamethasone cream 01.% and Norco 5/325mg give in clinic by Dr. Horton. Wound care dressing orders followed. Continue IV antibiotic as previously ordered.  04/17/19: F/u for non-healing wound to abdomen. Culture report negative. Dr. Horton ordered 1 more week of IV antibiotics. Continue with topical wound care as ordered.     4/24/2019: Clinic visit with Dr. Horton for abdominal wound treatment. Wound improvement present, pictures on file. Last IV antibiotic today 4/24/2019, [facility] to discontinue PICC line from left upper arm on 4/25/2019. Per Lyubov Prince, going to [place] on Friday 4/26/2019 and out of the country ([place]) on Monday 4/29/2019. This nurse spoke to Shaunna at [employer] ([employer] antibiotic delivery company) and informed last dose of antibiotic is today. Dr. Horton ordered Hydrocodone-acetaminophen (Norco) 5-325 mg (32 tabs), Bentyl 10 mg (120 capsules) and Gentamycin ointment. Education provided on the importance of eating food that contains iron (to prevent anemia) as well as eating meat and taking her blood pressure medications (blood pressure medication) on time, voices understanding. All questions answered by Dr. Horton. [facility] outpatient confirmed Betamethasone cream ordered on 4/10/2019 is ready for . Instructions and education provided to Mrs. Morrison on abdominal wound dressing changes, hand washing techniques and medication use, effects and side effects, voices and demonstrates understanding. Discharged home on stable conditions, [facility] will give wound care clinic a call when she is back from [place] in few months from now.     7/17/19: Readmit today to wound care clinic.  Patient was recently out of the  country visiting family in [place].  Patient complains of pain and nausea as on prior visits.  Dr. Horton seen patient today discussed with patient again placing colostomy patient refused.  Culture of wound taken, Dr. Horton restarted patient on zofran po for nausea, and Norco for pain. [facility] restarted today in clinic on Mondays and Fridays and prn. Orders given to gently pack wound with aquacel ag rope, cover with aquacel extra, 4x4 gauze, small abd pad and mefix tape change dressing ever other day by [facility] and Prn per patient.     7/24/2019: Clinic visit with Dr. Horton. Continue with dressing change as ordered. Wound cultures reviewed with patient, lab work ordered by Dr. Horton, no fever present or reported at this time. C/o decrease appetite, medication periactin prescribed. Requesting needles for insulin pen, order prescribed by Dr. Hortno.      7/31/2019: F/U clinic visit with Dr. Horton. C/o of excruciated back pain 10/10, not feeling well, lot of gas and left upper quadrant discomfort. New orders prescribed: Augmentin 875-125 mg by mouth twice a day. Bentyl 10 mg one tab by mouth 4 times a day. Tramadol 50 mg one tab by mouth every 6 hours as needed for pain. Abdomen ultrasound. Wound care dressing completed as ordered.     8/7/2019: Clinic visit with Dr. Horton, denies any pain at this time. Wound care dressing done as ordered, no changes in wound size from last visit noted. Continue taking oral antiiotic as ordered, pending abdominal ultrasound, scheduled for 8/13/2019.     8/14/2019: F/U clinic visit with Dr. Horton. Admitted and discharged from emergency department this morning with abdominal pain, CT and ultrasound in filed. Dr. Horton performed a small drainage from the abdominal wound, moderate amount of creamy, serosanguineous fluid from abdominal wound present. Iodoform gauze packed into her wound, dressing changed as ordered. Wound cultures collected and sent to  lab/micro, pending results. Rx for Norco 5/325 mg one tab PO every 4 hours PRN as needed # 24 given to pt by Dr. Horton.      8/21/2019: Clinic visit with Dr. Horton. Wound dressing changed as ordered.      8/28/2019: Clinic visit with Dr. Horton. Continue with dressing change as ordered.      09/04/19: F/u wound care visit with Dr. Horton. Continue current wound care treatment. CBC and CMP ordered today     9/11/2019: Clinic visit with Dr. Horton. Continue with PO antibiotics, and wound dressing changed as ordered. New medication prescribed for arthritis, and pain, RX provided.    9/18/2019: Clinic visit with Dr. Horton. Wound dressing changed as ordered. Silver nitrate x 1 used by Dr. Horton, tolerated.  9/25/19: Patient seen today in clinic by Dr. Horton.  No changes to wound, wound care continued as ordered.  Silver Nitrate x 1 by Dr. Horton patient tolereated well. Rx sent to patient's pharmacy for Nausea medicine.     Review of Systems   Constitutional: Negative.    HENT: Negative.    Eyes: Negative.    Respiratory: Negative.    Cardiovascular: Negative.    Gastrointestinal: Negative.    Genitourinary: Negative.    Musculoskeletal: Negative.    Skin: Negative.    Neurological: Negative.    Psychiatric/Behavioral: Negative.        Objective:      Physical Exam   Constitutional: She is oriented to person, place, and time. She appears well-developed and well-nourished.   HENT:   Head: Normocephalic.   Eyes: Pupils are equal, round, and reactive to light. Conjunctivae and EOM are normal.   Neck: Normal range of motion. Neck supple.   Cardiovascular: Normal rate, regular rhythm, normal heart sounds and intact distal pulses.   Pulmonary/Chest: Effort normal and breath sounds normal.   Abdominal: Soft. Bowel sounds are normal.   Musculoskeletal: Normal range of motion.   Neurological: She is alert and oriented to person, place, and time. She has normal reflexes.   Skin: Skin is warm and dry.        Assessment:       No diagnosis found.       Incision/Site 02/22/19 1146 Abdomen midline midline (Active)   02/22/19 1146    Present Prior to Hospital Arrival?:    Side:    Location: Abdomen   Orientation: midline   Incision Type: midline   Closure Method: Staples   Additional Comments:    Removal Indication and Assessment:    Wound Outcome:    Removal Indications:    Wound Image   9/25/2019  8:51 AM   Dressing Appearance Intact;Moist drainage 9/25/2019  8:51 AM   Drainage Amount Small 9/25/2019  8:51 AM   Drainage Characteristics/Odor Serosanguineous;Bleeding uncontrolled 9/25/2019  8:51 AM   Appearance Red;Pink;Moist 9/25/2019  8:51 AM   Red (%), Wound Tissue Color 100 % 9/25/2019  8:51 AM   Periwound Area Intact;[place] 9/25/2019  8:51 AM   Wound Edges Defined 9/25/2019  8:51 AM   Wound Length (cm) 0.5 cm 9/25/2019  8:51 AM   Wound Width (cm) 0.7 cm 9/25/2019  8:51 AM   Wound Depth (cm) 3.7 cm 9/25/2019  8:51 AM   Wound Volume (cm^3) 1.3 cm^3 9/25/2019  8:51 AM   Wound Surface Area (cm^2) 0.35 cm^2 9/25/2019  8:51 AM   Undermining (depth (cm)/location) 3.5cm 11-1 o'clock 9/25/2019  8:51 AM   Care Cleansed with:;Sterile normal saline;Skin Barrier 9/25/2019  8:51 AM   Dressing Other (see comments) 9/25/2019  8:51 AM   Packing gauze, iodoform;Incision packed with 9/25/2019  8:51 AM   Packing Inserted  1 9/25/2019  8:51 AM   Periwound Care Skin barrier film applied 9/25/2019  8:51 AM   Dressing Change Due 09/27/19 9/25/2019  8:51 AM         Clean wound with normal saline  Lidocaine 2% gel or 4 % Topical solution: PRN  Other: Cavilon to chauncey wound  Primary dressing: Apply gentamycin to wound bed. Pack wound with Iodoform gauze, cover with 4x4 gauze, small ABD pad, secure with mefix tape.  Frequency: Mondays and Fridays by [facility] and on Wednesdays at wound care clinic.  Other orders: Continue po antibiotic Augmentin.   Rx sent to pt pharmacy for nausea medicine today in clinic.     Follow up in about 1 week  (around 10/2/2019).  Plan:              10/2/19: Dr. Horton

## 2019-09-26 ENCOUNTER — TELEPHONE (OUTPATIENT)
Dept: HOME HEALTH SERVICES | Facility: HOSPITAL | Age: 69
End: 2019-09-26

## 2019-10-02 ENCOUNTER — HOSPITAL ENCOUNTER (OUTPATIENT)
Dept: WOUND CARE | Facility: HOSPITAL | Age: 69
Discharge: HOME | End: 2019-10-02
Attending: SURGERY
Payer: MEDICARE

## 2019-10-02 VITALS
WEIGHT: 168 LBS | SYSTOLIC BLOOD PRESSURE: 133 MMHG | HEART RATE: 82 BPM | TEMPERATURE: 98 F | DIASTOLIC BLOOD PRESSURE: 79 MMHG | HEIGHT: 62 IN | BODY MASS INDEX: 30.91 KG/M2

## 2019-10-02 DIAGNOSIS — E11.622 TYPE 2 DIABETES MELLITUS WITH OTHER SKIN ULCER, UNSPECIFIED WHETHER LONG TERM INSULIN USE: ICD-10-CM

## 2019-10-02 DIAGNOSIS — L02.211 ABDOMINAL WALL ABSCESS: ICD-10-CM

## 2019-10-02 DIAGNOSIS — L98.499 TYPE 2 DIABETES MELLITUS WITH OTHER SKIN ULCER, UNSPECIFIED WHETHER LONG TERM INSULIN USE: ICD-10-CM

## 2019-10-02 DIAGNOSIS — L02.211 ABSCESS OF ABDOMINAL WALL: Primary | ICD-10-CM

## 2019-10-02 PROCEDURE — 99213 OFFICE O/P EST LOW 20 MIN: CPT

## 2019-10-02 NOTE — PROGRESS NOTES
"Subjective:       Patient ID: Azucena Morrison is a 69 y.o. female.    Chief Complaint: Non-healing Wound (surgical wound abdominal abscess)    /29/17: Pt re-admit for distal abdominal wound. Pt denies any fevers or chills but states she feels nauseous at times. Returned to [place] June 28, from [place], reports much trouble with abdominal wound while in [place].  7/6/17-- Patient scheduled for surgical drainage of wound Tuesday 7/11/17DB 7/19/17-- Patient post op clinic visit.  8/16/17: CT of abdomen and pelvis done 8/9/17 due to suspected fistula  8/23/17-- U/S of abdomen done today.  12/13/17 Wound vac with 20cc drainage in clinic today.  1/10/18: on PO abx  1/24/18: Fistulagram ordered per Dr. Horton. Patient still on PO abx  02/21/18 Patient is not on antibiotics at this time. BS fasting 135 per patient report this am.  03/07/18 Culture of Anterior Abdominal Wound is positive. No antibiotics at this time.  fasting per patient report.    03/21/18 Patient c/o nausea along with abdominal tenderness near abd midline wound. Patient states that pain level is 6 and that she passed two sero-sanguineous clots from wound. Patient is afebrile this am.  3/28/18: patient continue antibiotics and reports that pain is less than last weeks clinic visit. Drainage has decreased as well  04/04/18 Patient states that abdominal pain is "pressure" sensation and that when she pushes down on her abdomen on either side of abdominal wound she feels like she has to urinate. Patient reports pain level of 3 this am.  fasting this am per patient report.   5/2/18: Patient had Abdomina Toribio today before wound care. She had discontinued Bactrim PO per Dr. Horton's recommendation and culture results and is now taking Amoxicillin PO  6/20/18: Pt reports itching to wound and periwound   6/27/18: patient reports no new complaints with regards to her wound or abdomen, wound continues to decrease in size and drainage  8/1/18: Pt reports " increased pain to abdomen with nausea and emesis since thursday 7/26/18, denies any fevers, patient did no go to ER as instructed by home health nurse on Sat. 7/28/18.  8/29/18: Patient admitted to hospital 8/2 for abdominal wound complications. Surgery for abdominal abscess per Dr. Horton on 8/3. Patient placed on IV antibiotics and discharged home on 8/18 with [facility]  and PICC line to LUE. Patient currently on IV ertapenem. IV medication sent to patients home per [facility].   9/5/18: IV Ertapenem to continue 1 week. Culture sent to lab. [facility] sent orders. PICC line intact to E.  9/12/18 Antibiotics completed. Culture results pending. 1 deep stitch remaining.  9/12/18: Culture positive for E. Coli, patient to continue Ertapenem IV antibiotics x 2weeks. [facility] notified  9/26/18: F/U abdominal wound, wound continues to improve. Patient will complete IV antibiotics today  10/3/18: patient c/o diarrhea for 2 days and nausea with some vomiting. Labs and stool culture ordered. IV antibiotics discontinued today  10/10/2018 patient will start on IV antibiotic Invaz IV once a day, pending PICC line placement, order placed today  for PICC per Dr. Horton.  10/24/18: patient on IV antibiotics, tolerating well. Continue for 1 week  11/7/2018: IV Antibiotic discontinued.  11/8/2018: PICC line to left upper arm discontinued by Home Health.  11/21/2018 F/u for abdominal wall fistula , c/o nausea  No vomiting , no fever  12/5/2018: F/u for abdominal wall fisula, c/o gas, Dr. Horton will order Bentyl. Surgery schedule for January 2019.  12/12/2018: F/u for abdominal wall fistula, c/o abdominal pain. Dr. Horton ordered Norco 5/325mg tab 1 PO every 4 hours as needed for pain and referred patient for x-ray of abdomen after clinic today.  12/19/2018: F/u for abdominal wall fisula, c/o nausea, Dr. Horton re ordered Ondansetron (Zofran) 8mg one tab by mouth every eight hours as needed for nausea. No  changes in wound condition from last visit noted in clinic today.  12/26/2018: F/u abdominal wall fistula drainage, no c/o at this time. Denies any abdominal pain or nausea. Dr. Horton recommends surgery first week of January 2019 for abdominal fistula treatment and possible colostomy placement, patient agree.  01/02/2019: F/u abdominal wall fistula. Dressing with large amount of drainage, malodorous, Dr. Horton is scheduling surgery for third week of January, 2019.  1/16/19: F/U Dr. Horton today in clinic, surgery scheduled for next Friday 1/25/19 with Dr. Horton.     01/23/2019: Presents to wound care clinic for f/u abdominal wound care tx. Surgery scheduled with Dr. Horton for Friday 01/25/2019. Dr. Horton explained Mrs. Morrison surgery procedure including possible complications, Nurse  (Palest[place]) present, patient verbalizes understanding of procedure including possible complications, all questions from patient were answered by Dr. Horton including blood sugar and blood pressure medications per patient's concerns. Consent for surgery and blood transfusion signed by patient, Dr. Horton and nurse witnessed.  Abdominal wound cultures collected per Dr. Horton, cultures sent to lab/micro pending results. Dr. Horton prescribed the following orders: fleet enema for Thursday 1/24/2019, clear liquid diet and not to eat after midnight all for 1/24/2019. Start taking Neomycin and erythromycin by mouth three times a day (1/24/2019) and dulcolax one tab by mouth twice a day, Mrs. Morrison voices understanding.     01/30/2019: F/U wound care treatment with Dr. Horton. Per pt, primary physician Dr. Pj Monae ordered for her to take potassium pills for three days, last day today and scheduled for lab work at primary physician clinic on 1/31/2019. Per pt. Feeling better, no more abdominal pain (went to ER 1/24/2019 and discharged 1/25/2019 c/o abd pain.). Dr. Horton awaiting on primary clearance to  "re-schedule surgery, per pt. She will call wound care clinic and Dr. Horton to make aware of clearance day. Continue with same orders for dressing change for Dr. Horton. Mrs. Morrison requesting gentamicin refill.     02/06/2019: F/u with Dr. Horton for wound care treatment. Mrs. Morrison brought to clinic a clearance for surgery by Dr. Pj Sanches dated 02/06/2019 "Hyperkalemia-Resolved K 4.8 2/1/2019, labs , Cr. 1.05, H/H 10/30. No contraindication to surgery, tight control of BS, Hydration." A copy of EKG (1/24/2019) and lab work from [facility] collected 1/31/2019, reported results 2/1/2019. Dr. Horton scheduled surgery for 02/22/2019, consent were signed on 01/23/2019, all questions were answered by Dr. Horton, this nurse  ([place]) present. Education provided to patient on the importance of having a clear liquid diet the day before surgery 02/21/2019 as per Dr. Horton, new medications and preop preparation before surgery, verbalizes understanding. Dr. Horton ordered Norco 5/325 mg PO for pain, Dulcolax two tabs by mouth to take on 02/21/2019, Soap suds enema (SSE) on Thursday 02/21/2019, Golytely by mouth 2 liters on 2/21/2019, Erythromycin 500 mg one tab by mouth three times a day and Neomycin 1 gm by mouth three times a day.     2/13/2019: Presents to wound care clinic for a f/u with Dr. Horton for wound care treatment. No new orders in wound dressing change, reviewed preop orders with Mrs. Morrison per Dr. Horton, all questions answered. Surgery scheduled for 02/22/2019.  2/20/19: Continues wound care a previously ordered.  Depth measured per Dr. Horton 8cm.  Preop orders reviewed with patient who verbalized understanding.  Surgery scheduled for Fri 2/22/19.  Rx given to patient for Norco and Zofran.    03/06/19: Patient admitted to [facility] on 02/22/19 for exploratory laparotomy of abdomen per Dr. Horton. Patient discharged on 02/28/19 with home health and PICC line " "with IV antibiotics. Staples removed today in clinic. Patient denies any fever, chills, n&v; however, she states that she has "low energy." Continued with wound care as ordered.     3/13/2019: F/U with Dr. Horton for wound care treatment. Continue with same wound dressing change orders as per Dr. Horton, orders followed. Home Health to continue wound dressing change and lab draw for CBC weekly; continue IV Ertapenem/Invaz 1 gm IV daily as ordered.     3/20/2019: Clinic visit with Dr. Horton for abdominal abscess treatment. Presents with moderate draining from abdominal wound; wound size decreasing per measurement. Per Dr. Horton, apply one-piece system ostomy bag to abdominal wound for drain collection, may drain bag when it fills and may change every other day, continue applying gentamicin to wound bed before applying ostomy bag. Continue IV Invaz until completion. If ostomy bag not effective for draining collection, may return to previous orders for wound dressing change. Orders followed. Education provided to Mrs. Morrison on handwashing and ostomy care techniques, voices and demonstrates understanding.   3/27/19: Follow up with Dr. Horton today in clinic for abdominal abscess, moderated tanish yellow drainge present on dressing.  Wound slowly improving, patient refused one-piece ostomy bag stated " no it's too messy." requested to return to previous dressing prior to ostomy bag placement- iodoform gauze, aquacel extra, sm abd, and mefix tape.  Silver nitrate x1 per Dr. Horton today in clinic. Rx given to patient for PO Zofran.     04/03/2019: F/u clinic visit with Dr. Horton. Abdominal wound improving in size, picture on file. Wound cultures from abdominal abscess collected and sent to lab/micro, pending results. Mrs. Morrison states going out of the country ([place]) at the end of April and plans to stay there for approximately two months. Dr. Horton ordered IV antibiotic for two more weeks and new " wound care dressing, orders followed.     4/9/2019: Clinic visit with Dr. Horton.  Per Dr. Horton, wound deep measurement increased, draining present. Wound cultures from abdominal wound taken and sent to lab/micro, pending results. C/o abdominal pain and itching around wound, Rx for betamethasone cream 01.% and Norco 5/325mg give in clinic by Dr. Horton. Wound care dressing orders followed. Continue IV antibiotic as previously ordered.  04/17/19: F/u for non-healing wound to abdomen. Culture report negative. Dr. Horton ordered 1 more week of IV antibiotics. Continue with topical wound care as ordered.     4/24/2019: Clinic visit with Dr. Horton for abdominal wound treatment. Wound improvement present, pictures on file. Last IV antibiotic today 4/24/2019, [facility] to discontinue PICC line from left upper arm on 4/25/2019. Per Lyubov Prince, going to [place] on Friday 4/26/2019 and out of the country ([place]) on Monday 4/29/2019. This nurse spoke to Shaunna at [employer] (IV antibiotic delivery company) and informed last dose of antibiotic is today. Dr. Horton ordered Hydrocodone-acetaminophen (Norco) 5-325 mg (32 tabs), Bentyl 10 mg (120 capsules) and Gentamycin ointment. Education provided on the importance of eating food that contains iron (to prevent anemia) as well as eating meat and taking her blood pressure medications (blood pressure medication) on time, voices understanding. All questions answered by Dr. Horton. [facility] outpatient confirmed Betamethasone cream ordered on 4/10/2019 is ready for . Instructions and education provided to Mrs. Morrison on abdominal wound dressing changes, hand washing techniques and medication use, effects and side effects, voices and demonstrates understanding. Discharged home on stable conditions, [facility] will give wound care clinic a call when she is back from [place] in few months from now.     7/17/19: Readmit today to wound care clinic.   Patient was recently out of the country visiting family in [place].  Patient complains of pain and nausea as on prior visits.  Dr. Horton seen patient today discussed with patient again placing colostomy patient refused.  Culture of wound taken, Dr. Horton restarted patient on zofran po for nausea, and Norco for pain. [facility] restarted today in clinic on Mondays and Fridays and prn. Orders given to gently pack wound with aquacel ag rope, cover with aquacel extra, 4x4 gauze, small abd pad and mefix tape change dressing ever other day by [facility] and Prn per patient.     7/24/2019: Clinic visit with Dr. Horton. Continue with dressing change as ordered. Wound cultures reviewed with patient, lab work ordered by Dr. Horton, no fever present or reported at this time. C/o decrease appetite, medication periactin prescribed. Requesting needles for insulin pen, order prescribed by Dr. Horton.      7/31/2019: F/U clinic visit with Dr. Horton. C/o of excruciated back pain 10/10, not feeling well, lot of gas and left upper quadrant discomfort. New orders prescribed: Augmentin 875-125 mg by mouth twice a day. Bentyl 10 mg one tab by mouth 4 times a day. Tramadol 50 mg one tab by mouth every 6 hours as needed for pain. Abdomen ultrasound. Wound care dressing completed as ordered.     8/7/2019: Clinic visit with Dr. Horton, denies any pain at this time. Wound care dressing done as ordered, no changes in wound size from last visit noted. Continue taking oral antiiotic as ordered, pending abdominal ultrasound, scheduled for 8/13/2019.     8/14/2019: F/U clinic visit with Dr. Horton. Admitted and discharged from emergency department this morning with abdominal pain, CT and ultrasound in filed. Dr. Horton performed a small drainage from the abdominal wound, moderate amount of creamy, serosanguineous fluid from abdominal wound present. Iodoform gauze packed into her wound, dressing changed as ordered. Wound  cultures collected and sent to lab/micro, pending results. Rx for Norco 5/325 mg one tab PO every 4 hours PRN as needed # 24 given to pt by Dr. Horton.      8/21/2019: Clinic visit with Dr. Horton. Wound dressing changed as ordered.      8/28/2019: Clinic visit with Dr. Horton. Continue with dressing change as ordered.      09/04/19: F/u wound care visit with Dr. Horton. Continue current wound care treatment. CBC and CMP ordered today     9/11/2019: Clinic visit with Dr. Horton. Continue with PO antibiotics, and wound dressing changed as ordered. New medication prescribed for arthritis, and pain, RX provided.    9/18/2019: Clinic visit with Dr. Horton. Wound dressing changed as ordered. Silver nitrate x 1 used by Dr. Horton, tolerated.  9/25/19: Patient seen today in clinic by Dr. Horton.  No changes to wound, wound care continued as ordered.  Silver Nitrate x 1 by Dr. Horton patient tolereated well. Rx sent to patient's pharmacy for Nausea medicine.   10/02/19 Patient is Wound Center today for evaluation and treatment of abdominal wound.  Review of Systems   Constitutional: Negative.    HENT: Negative.    Eyes: Negative.    Respiratory: Negative.    Cardiovascular: Negative.    Gastrointestinal: Negative.    Genitourinary: Negative.    Musculoskeletal: Negative.    Skin: Negative.    Neurological: Negative.    Psychiatric/Behavioral: Negative.        Objective:      Physical Exam   Constitutional: She is oriented to person, place, and time. She appears well-developed and well-nourished.   HENT:   Head: Normocephalic.   Eyes: Pupils are equal, round, and reactive to light. Conjunctivae and EOM are normal.   Neck: Normal range of motion. Neck supple.   Cardiovascular: Normal rate, regular rhythm, normal heart sounds and intact distal pulses.   Pulmonary/Chest: Effort normal and breath sounds normal.   Abdominal: Soft. Bowel sounds are normal.   Musculoskeletal: Normal range of motion.   Neurological: She is  alert and oriented to person, place, and time. She has normal reflexes.   Skin: Skin is warm and dry.       Assessment:       No diagnosis found.       Incision/Site 02/22/19 1146 Abdomen midline midline (Active)   02/22/19 1146    Present Prior to Hospital Arrival?:    Side:    Location: Abdomen   Orientation: midline   Incision Type: midline   Closure Method: Staples   Additional Comments:    Removal Indication and Assessment:    Wound Outcome:    Removal Indications:    Wound Image   10/2/2019 10:00 AM   Dressing Appearance Clean;Intact;Moist drainage 10/2/2019 10:00 AM   Drainage Amount Small 10/2/2019 10:00 AM   Drainage Characteristics/Odor No odor 10/2/2019 10:00 AM   Appearance Pink;Yellow 10/2/2019 10:00 AM   Black (%), Wound Tissue Color 0 % 10/2/2019 10:00 AM   Red (%), Wound Tissue Color 70 % 10/2/2019 10:00 AM   Yellow (%), Wound Tissue Color 30 % 10/2/2019 10:00 AM   Periwound Area Intact;Dry;Pink 10/2/2019 10:00 AM   Wound Edges Irregular 10/2/2019 10:00 AM   Wound Length (cm) 0.5 cm 10/2/2019 10:00 AM   Wound Width (cm) 0.6cm 10/2/2019 10:00 AM   Wound Depth (cm) 1.3cm 10/2/2019 10:00 AM   Wound Volume (cm^3) 0.39 cm^3 10/2/2019 10:00 AM   Wound Surface Area (cm^2) 0.65 cm^2 10/2/2019 10:00 AM   Care Cleansed with:;Sterile normal saline;Applied: 10/2/2019 10:00 AM   Dressing Applied 10/2/2019 10:00 AM   Periwound Care Skin barrier film applied 10/2/2019 10:00 AM   Dressing Change Due 10/04/19 10/2/2019 10:00 AM   Lower abdominal wound still with depth at 6 o'clock 1.3cm. Drainage on dressing very small; however, when q tip used to check depth small amount creamy red  drainage comes from wound. Slight induration around wound. Patient complaints slight sensitivity when wound touched.    Wound is dressed weekly in clinic and twice a week with Home Health. No complaints fever or nausea. Silver nitrate x1 stick to wound bed by Dr. Horton.    Plan:       Follow up with Dr. Horton Wednesday 10/ 09/19.

## 2019-10-09 ENCOUNTER — HOSPITAL ENCOUNTER (OUTPATIENT)
Dept: WOUND CARE | Facility: HOSPITAL | Age: 69
Discharge: HOME | End: 2019-10-09
Attending: SURGERY
Payer: MEDICARE

## 2019-10-09 VITALS
SYSTOLIC BLOOD PRESSURE: 147 MMHG | DIASTOLIC BLOOD PRESSURE: 71 MMHG | TEMPERATURE: 98 F | HEIGHT: 62 IN | HEART RATE: 72 BPM | BODY MASS INDEX: 30.91 KG/M2 | WEIGHT: 168 LBS

## 2019-10-09 DIAGNOSIS — L98.499 TYPE 2 DIABETES MELLITUS WITH OTHER SKIN ULCER, UNSPECIFIED WHETHER LONG TERM INSULIN USE: ICD-10-CM

## 2019-10-09 DIAGNOSIS — L02.211 ABDOMINAL WALL ABSCESS: ICD-10-CM

## 2019-10-09 DIAGNOSIS — T14.8XXA DRAINAGE FROM WOUND: ICD-10-CM

## 2019-10-09 DIAGNOSIS — L02.211 ABSCESS OF ABDOMINAL WALL: Primary | ICD-10-CM

## 2019-10-09 DIAGNOSIS — E11.622 TYPE 2 DIABETES MELLITUS WITH OTHER SKIN ULCER, UNSPECIFIED WHETHER LONG TERM INSULIN USE: ICD-10-CM

## 2019-10-09 DIAGNOSIS — I10 ESSENTIAL HYPERTENSION: ICD-10-CM

## 2019-10-09 DIAGNOSIS — K63.2 COLOCUTANEOUS FISTULA: ICD-10-CM

## 2019-10-09 PROCEDURE — 99213 OFFICE O/P EST LOW 20 MIN: CPT

## 2019-10-09 RX ORDER — DICYCLOMINE HYDROCHLORIDE 10 MG/1
CAPSULE ORAL
Qty: 120 CAPSULE | Refills: 0 | Status: SHIPPED | OUTPATIENT
Start: 2019-10-09 | End: 2019-10-09 | Stop reason: SDUPTHER

## 2019-10-09 RX ORDER — DICYCLOMINE HYDROCHLORIDE 10 MG/1
CAPSULE ORAL
Qty: 120 CAPSULE | Refills: 0 | Status: SHIPPED | OUTPATIENT
Start: 2019-10-09 | End: 2019-12-04 | Stop reason: SDUPTHER

## 2019-10-09 RX ORDER — TRAMADOL HYDROCHLORIDE 50 MG/1
TABLET ORAL
Qty: 24 TABLET | Refills: 0 | Status: SHIPPED | OUTPATIENT
Start: 2019-10-09 | End: 2019-10-09 | Stop reason: SDUPTHER

## 2019-10-09 RX ORDER — TRAMADOL HYDROCHLORIDE 50 MG/1
TABLET ORAL
Qty: 24 TABLET | Refills: 0 | Status: ON HOLD | OUTPATIENT
Start: 2019-10-09 | End: 2020-06-30

## 2019-10-09 NOTE — PROGRESS NOTES
"Subjective:       Patient ID: Azucena Morrison is a 69 y.o. female.    Chief Complaint: Non-healing Wound Follow Up /29/17: Pt re-admit for distal abdominal wound. Pt denies any fevers or chills but states she feels nauseous at times. Returned to [place] June 28, from [place], reports much trouble with abdominal wound while in [place].  7/6/17-- Patient scheduled for surgical drainage of wound Tuesday 7/11/17DB  7/19/17-- Patient post op clinic visit.  8/16/17: CT of abdomen and pelvis done 8/9/17 due to suspected fistula  8/23/17-- U/S of abdomen done today.  12/13/17 Wound vac with 20cc drainage in clinic today.  1/10/18: on PO abx  1/24/18: Fistulagram ordered per Dr. Horton. Patient still on PO abx  02/21/18 Patient is not on antibiotics at this time. BS fasting 135 per patient report this am.  03/07/18 Culture of Anterior Abdominal Wound is positive. No antibiotics at this time.  fasting per patient report.    03/21/18 Patient c/o nausea along with abdominal tenderness near abd midline wound. Patient states that pain level is 6 and that she passed two sero-sanguineous clots from wound. Patient is afebrile this am.  3/28/18: patient continue antibiotics and reports that pain is less than last weeks clinic visit. Drainage has decreased as well  04/04/18 Patient states that abdominal pain is "pressure" sensation and that when she pushes down on her abdomen on either side of abdominal wound she feels like she has to urinate. Patient reports pain level of 3 this am.  fasting this am per patient report.   5/2/18: Patient had Abdomina Toribio today before wound care. She had discontinued Bactrim PO per Dr. Horton's recommendation and culture results and is now taking Amoxicillin PO  6/20/18: Pt reports itching to wound and periwound   6/27/18: patient reports no new complaints with regards to her wound or abdomen, wound continues to decrease in size and drainage  8/1/18: Pt reports increased pain to abdomen " with nausea and emesis since thursday 7/26/18, denies any fevers, patient did no go to ER as instructed by home health nurse on Sat. 7/28/18.  8/29/18: Patient admitted to hospital 8/2 for abdominal wound complications. Surgery for abdominal abscess per Dr. Horton on 8/3. Patient placed on IV antibiotics and discharged home on 8/18 with [facility]  and PICC line to LUE. Patient currently on IV ertapenem. IV medication sent to patients home per [facility].   9/5/18: IV Ertapenem to continue 1 week. Culture sent to lab. [facility]  sent orders. PICC line intact to E.  9/12/18 Antibiotics completed. Culture results pending. 1 deep stitch remaining.  9/12/18: Culture positive for E. Coli, patient to continue Ertapenem IV antibiotics x 2weeks. [facility] notified  9/26/18: F/U abdominal wound, wound continues to improve. Patient will complete IV antibiotics today  10/3/18: patient c/o diarrhea for 2 days and nausea with some vomiting. Labs and stool culture ordered. IV antibiotics discontinued today  10/10/2018 patient will start on IV antibiotic Invaz IV once a day, pending PICC line placement, order placed today  for PICC per Dr. Horton.  10/24/18: patient on IV antibiotics, tolerating well. Continue for 1 week  11/7/2018: IV Antibiotic discontinued.  11/8/2018: PICC line to left upper arm discontinued by Home Health.  11/21/2018 F/u for abdominal wall fistula , c/o nausea  No vomiting , no fever  12/5/2018: F/u for abdominal wall fisula, c/o gas, Dr. Horton will order Bentyl. Surgery schedule for January 2019.  12/12/2018: F/u for abdominal wall fistula, c/o abdominal pain. Dr. Horton ordered Norco 5/325mg tab 1 PO every 4 hours as needed for pain and referred patient for x-ray of abdomen after clinic today.  12/19/2018: F/u for abdominal wall fisula, c/o nausea, Dr. Horton re ordered Ondansetron (Zofran) 8mg one tab by mouth every eight hours as needed for nausea. No changes in wound condition  from last visit noted in clinic today.  12/26/2018: F/u abdominal wall fistula drainage, no c/o at this time. Denies any abdominal pain or nausea. Dr. Horton recommends surgery first week of January 2019 for abdominal fistula treatment and possible colostomy placement, patient agree.  01/02/2019: F/u abdominal wall fistula. Dressing with large amount of drainage, malodorous, Dr. Horton is scheduling surgery for third week of January, 2019.  1/16/19: F/U Dr. Horton today in clinic, surgery scheduled for next Friday 1/25/19 with Dr. Horton.     01/23/2019: Presents to wound care clinic for f/u abdominal wound care tx. Surgery scheduled with Dr. Horton for Friday 01/25/2019. Dr. Horton explained Mrs. Morrison surgery procedure including possible complications, Nurse  (Maltese) present, patient verbalizes understanding of procedure including possible complications, all questions from patient were answered by Dr. Horton including blood sugar and blood pressure medications per patient's concerns. Consent for surgery and blood transfusion signed by patient, Dr. Horton and nurse witnessed.  Abdominal wound cultures collected per Dr. Horton, cultures sent to lab/micro pending results. Dr. Horton prescribed the following orders: fleet enema for Thursday 1/24/2019, clear liquid diet and not to eat after midnight all for 1/24/2019. Start taking Neomycin and erythromycin by mouth three times a day (1/24/2019) and dulcolax one tab by mouth twice a day, Mrs. Morrison voices understanding.     01/30/2019: F/U wound care treatment with Dr. Horton. Per pt, primary physician Dr. Pj Monae ordered for her to take potassium pills for three days, last day today and scheduled for lab work at primary physician clinic on 1/31/2019. Per pt. Feeling better, no more abdominal pain (went to ER 1/24/2019 and discharged 1/25/2019 c/o abd pain.). Dr. Horton awaiting on primary clearance to re-schedule surgery, per pt. She  "will call wound care clinic and Dr. Horton to make aware of clearance day. Continue with same orders for dressing change for Dr. Horton. Mrs. Morrison requesting gentamicin refill.     02/06/2019: F/u with Dr. Horton for wound care treatment. Mrs. Morrison brought to clinic a clearance for surgery by Dr. Pj Sanches dated 02/06/2019 "Hyperkalemia-Resolved K 4.8 2/1/2019, labs , Cr. 1.05, H/H 10/30. No contraindication to surgery, tight control of BS, Hydration." A copy of EKG (1/24/2019) and lab work from [facility] collected 1/31/2019, reported results 2/1/2019. Dr. Horton scheduled surgery for 02/22/2019, consent were signed on 01/23/2019, all questions were answered by Dr. Horton, this nurse  (Slovak) present. Education provided to patient on the importance of having a clear liquid diet the day before surgery 02/21/2019 as per Dr. Horton, new medications and preop preparation before surgery, verbalizes understanding. Dr. Horton ordered Norco 5/325 mg PO for pain, Dulcolax two tabs by mouth to take on 02/21/2019, Soap suds enema (SSE) on Thursday 02/21/2019, Golytely by mouth 2 liters on 2/21/2019, Erythromycin 500 mg one tab by mouth three times a day and Neomycin 1 gm by mouth three times a day.     2/13/2019: Presents to wound care clinic for a f/u with Dr. Horton for wound care treatment. No new orders in wound dressing change, reviewed preop orders with Mrs. Morrison per Dr. Horton, all questions answered. Surgery scheduled for 02/22/2019.  2/20/19: Continues wound care a previously ordered.  Depth measured per Dr. Horton 8cm.  Preop orders reviewed with patient who verbalized understanding.  Surgery scheduled for Fri 2/22/19.  Rx given to patient for Norco and Zofran.    03/06/19: Patient admitted to [facility] on 02/22/19 for exploratory laparotomy of abdomen per Dr. Horton. Patient discharged on 02/28/19 with home health and PICC line with IV antibiotics. Staples " "removed today in clinic. Patient denies any fever, chills, n&v; however, she states that she has "low energy." Continued with wound care as ordered.     3/13/2019: F/U with Dr. Horton for wound care treatment. Continue with same wound dressing change orders as per Dr. Horton, orders followed. Home Health to continue wound dressing change and lab draw for CBC weekly; continue IV Ertapenem/Invaz 1 gm IV daily as ordered.     3/20/2019: Clinic visit with Dr. Horton for abdominal abscess treatment. Presents with moderate draining from abdominal wound; wound size decreasing per measurement. Per Dr. Horton, apply one-piece system ostomy bag to abdominal wound for drain collection, may drain bag when it fills and may change every other day, continue applying gentamicin to wound bed before applying ostomy bag. Continue IV Invaz until completion. If ostomy bag not effective for draining collection, may return to previous orders for wound dressing change. Orders followed. Education provided to Mrs. Morrison on handwashing and ostomy care techniques, voices and demonstrates understanding.   3/27/19: Follow up with Dr. Horton today in clinic for abdominal abscess, moderated tanish yellow drainge present on dressing.  Wound slowly improving, patient refused one-piece ostomy bag stated " no it's too messy." requested to return to previous dressing prior to ostomy bag placement- iodoform gauze, aquacel extra, sm abd, and mefix tape.  Silver nitrate x1 per Dr. Horton today in clinic. Rx given to patient for PO Zofran.     04/03/2019: F/u clinic visit with Dr. Horton. Abdominal wound improving in size, picture on file. Wound cultures from abdominal abscess collected and sent to lab/micro, pending results. Mrs. Morrison states going out of the country ([place]) at the end of April and plans to stay there for approximately two months. Dr. Horton ordered IV antibiotic for two more weeks and new wound care dressing, orders " followed.     4/9/2019: Clinic visit with Dr. Horton.  Per Dr. Horton, wound deep measurement increased, draining present. Wound cultures from abdominal wound taken and sent to lab/micro, pending results. C/o abdominal pain and itching around wound, Rx for betamethasone cream 01.% and Norco 5/325mg give in clinic by Dr. Horton. Wound care dressing orders followed. Continue IV antibiotic as previously ordered.  04/17/19: F/u for non-healing wound to abdomen. Culture report negative. Dr. Horton ordered 1 more week of IV antibiotics. Continue with topical wound care as ordered.     4/24/2019: Clinic visit with Dr. Horton for abdominal wound treatment. Wound improvement present, pictures on file. Last IV antibiotic today 4/24/2019, [facility] to discontinue PICC line from left upper arm on 4/25/2019. Per Lyubov Prince, going to [place] on Friday 4/26/2019 and out of the country ([place]) on Monday 4/29/2019. This nurse spoke to Shaunna at [employer] ([facility] antibiotic delivery company) and informed last dose of antibiotic is today. Dr. Horton ordered Hydrocodone-acetaminophen (Norco) 5-325 mg (32 tabs), Bentyl 10 mg (120 capsules) and Gentamycin ointment. Education provided on the importance of eating food that contains iron (to prevent anemia) as well as eating meat and taking her blood pressure medications (blood pressure medication) on time, voices understanding. All questions answered by Dr. Horton. [facility] outpatient confirmed Betamethasone cream ordered on 4/10/2019 is ready for . Instructions and education provided to Mrs. Morrison on abdominal wound dressing changes, hand washing techniques and medication use, effects and side effects, voices and demonstrates understanding. Discharged home on stable conditions, [facility] will give wound care clinic a call when she is back from [place] in few months from now.     7/17/19: Readmit today to wound care clinic.  Patient was recently out of the  country visiting family in [place].  Patient complains of pain and nausea as on prior visits.  Dr. Horton seen patient today discussed with patient again placing colostomy patient refused.  Culture of wound taken, Dr. Horton restarted patient on zofran po for nausea, and Norco for pain. [facility] restarted today in clinic on Mondays and Fridays and prn. Orders given to gently pack wound with aquacel ag rope, cover with aquacel extra, 4x4 gauze, small abd pad and mefix tape change dressing ever other day by [facility] and Prn per patient.     7/24/2019: Clinic visit with Dr. Horton. Continue with dressing change as ordered. Wound cultures reviewed with patient, lab work ordered by Dr. Horton, no fever present or reported at this time. C/o decrease appetite, medication periactin prescribed. Requesting needles for insulin pen, order prescribed by Dr. Horton.      7/31/2019: F/U clinic visit with Dr. Horton. C/o of excruciated back pain 10/10, not feeling well, lot of gas and left upper quadrant discomfort. New orders prescribed: Augmentin 875-125 mg by mouth twice a day. Bentyl 10 mg one tab by mouth 4 times a day. Tramadol 50 mg one tab by mouth every 6 hours as needed for pain. Abdomen ultrasound. Wound care dressing completed as ordered.     8/7/2019: Clinic visit with Dr. Horton, denies any pain at this time. Wound care dressing done as ordered, no changes in wound size from last visit noted. Continue taking oral antiiotic as ordered, pending abdominal ultrasound, scheduled for 8/13/2019.     8/14/2019: F/U clinic visit with Dr. Horton. Admitted and discharged from emergency department this morning with abdominal pain, CT and ultrasound in filed. Dr. Horton performed a small drainage from the abdominal wound, moderate amount of creamy, serosanguineous fluid from abdominal wound present. Iodoform gauze packed into her wound, dressing changed as ordered. Wound cultures collected and sent to  lab/micro, pending results. Rx for Norco 5/325 mg one tab PO every 4 hours PRN as needed # 24 given to pt by Dr. Horton.      8/21/2019: Clinic visit with Dr. Horton. Wound dressing changed as ordered.      8/28/2019: Clinic visit with Dr. Horton. Continue with dressing change as ordered.      09/04/19: F/u wound care visit with Dr. Horton. Continue current wound care treatment. CBC and CMP ordered today     9/11/2019: Clinic visit with Dr. Horton. Continue with PO antibiotics, and wound dressing changed as ordered. New medication prescribed for arthritis, and pain, RX provided.    9/18/2019: Clinic visit with Dr. Horton. Wound dressing changed as ordered. Silver nitrate x 1 used by Dr. Horton, tolerated.  9/25/19: Patient seen today in clinic by Dr. Horton.  No changes to wound, wound care continued as ordered.  Silver Nitrate x 1 by Dr. Horton patient tolereated well. Rx sent to patient's pharmacy for Nausea medicine.   10/9/19: Patient seen today by Dr. Horton today in clinic. No changes wound. Silver Nitrate x 1 per Dr. Horton today in clinic, wound care as ordered.  Rx sent to pt's pharmacy for tramadol po  And Bentyl Po. F/u 1 week Dr. Horton    Review of Systems   Constitutional: Negative.    HENT: Negative.    Eyes: Negative.    Respiratory: Negative.    Cardiovascular: Negative.    Gastrointestinal: Negative.    Genitourinary: Negative.    Musculoskeletal: Negative.    Skin: Negative.    Neurological: Negative.    Psychiatric/Behavioral: Negative.        Objective:      Physical Exam   Constitutional: She is oriented to person, place, and time. She appears well-developed and well-nourished.   HENT:   Head: Normocephalic.   Eyes: Pupils are equal, round, and reactive to light. Conjunctivae and EOM are normal.   Neck: Normal range of motion. Neck supple.   Cardiovascular: Normal rate, regular rhythm, normal heart sounds and intact distal pulses.   Pulmonary/Chest: Effort normal and breath sounds  normal.   Abdominal: Soft. Bowel sounds are normal.   Musculoskeletal: Normal range of motion.   Neurological: She is alert and oriented to person, place, and time. She has normal reflexes.   Skin: Skin is warm and dry.       Assessment:       No diagnosis found.       Incision/Site 02/22/19 1146 Abdomen midline midline (Active)   02/22/19 1146    Present Prior to Hospital Arrival?:    Side:    Location: Abdomen   Orientation: midline   Incision Type: midline   Closure Method: Staples   Additional Comments:    Removal Indication and Assessment:    Wound Outcome:    Removal Indications:    Wound Image   10/9/2019  8:46 AM   Dressing Appearance Intact;Moist drainage 10/9/2019  8:46 AM   Drainage Amount Small 10/9/2019  8:46 AM   Drainage Characteristics/Odor No odor;Serosanguineous 10/9/2019  8:46 AM   Appearance Pink;Yellow 10/9/2019  8:46 AM   Red (%), Wound Tissue Color 80 % 10/9/2019  8:46 AM   Yellow (%), Wound Tissue Color 20 % 10/9/2019  8:46 AM   Periwound Area Intact;Dry;Pink 10/9/2019  8:46 AM   Wound Edges Irregular 10/9/2019  8:46 AM   Wound Length (cm) 0.5 cm 10/9/2019  8:46 AM   Wound Width (cm) 0.5 cm 10/9/2019  8:46 AM   Wound Depth (cm) 1.3 cm 10/9/2019  8:46 AM   Wound Volume (cm^3) 0.32 cm^3 10/9/2019  8:46 AM   Wound Surface Area (cm^2) 0.25 cm^2 10/9/2019  8:46 AM   Tunneling (depth (cm)/location) 2.2 cm @ 12:00 10/9/2019  8:46 AM   Care Cleansed with:;Sterile normal saline 10/9/2019  8:46 AM   Dressing Changed;Applied;Other (see comments);Abd pad 10/9/2019  8:46 AM   Periwound Care Skin barrier film applied;Absorptive dressing applied 10/9/2019  8:46 AM   Dressing Change Due 10/11/19 10/9/2019  8:46 AM       Abdominal wall abscess  - Primary    Clean wound with normal saline  Lidocaine 2% gel or 4 % Topical solution: PRN  Silver Nitrate x 1 per Dr. Horton  Other: Cavilon to chauncey wound  Primary dressing: Apply gentamycin to wound bed. Pack wound with Iodoform gauze, cover with 4x4 gauze, small  ABD pad, secure with mefix tape.  Frequency: Mondays and Fridays by [facility] and on Wednesdays at wound care clinic and prn need.  Other orders: Continue po antibiotic Augmentin.     Rx sent to pt's pharmacy for Bently and Tramadol PO today in clinic 10/9/19   Follow up in about 1 week (around 10/16/2019).    Plan:                 1 week Dr. Horton

## 2019-10-15 ENCOUNTER — TELEPHONE (OUTPATIENT)
Dept: HOME HEALTH SERVICES | Facility: HOSPITAL | Age: 69
End: 2019-10-15

## 2019-10-16 ENCOUNTER — HOSPITAL ENCOUNTER (OUTPATIENT)
Dept: WOUND CARE | Facility: HOSPITAL | Age: 69
Discharge: HOME | End: 2019-10-16
Attending: SURGERY
Payer: MEDICARE

## 2019-10-16 VITALS
BODY MASS INDEX: 30.91 KG/M2 | SYSTOLIC BLOOD PRESSURE: 157 MMHG | TEMPERATURE: 98 F | HEART RATE: 80 BPM | WEIGHT: 168 LBS | HEIGHT: 62 IN | DIASTOLIC BLOOD PRESSURE: 82 MMHG

## 2019-10-16 DIAGNOSIS — Z90.49 HISTORY OF HEMICOLECTOMY: ICD-10-CM

## 2019-10-16 DIAGNOSIS — L02.211 ABDOMINAL WALL ABSCESS: ICD-10-CM

## 2019-10-16 DIAGNOSIS — K63.2 COLOCUTANEOUS FISTULA: ICD-10-CM

## 2019-10-16 DIAGNOSIS — K63.2 COLONIC FISTULA: ICD-10-CM

## 2019-10-16 DIAGNOSIS — L02.211 ABSCESS OF ABDOMINAL WALL: Primary | ICD-10-CM

## 2019-10-16 DIAGNOSIS — E11.9 TYPE 2 DIABETES MELLITUS WITHOUT COMPLICATION, WITHOUT LONG-TERM CURRENT USE OF INSULIN: ICD-10-CM

## 2019-10-16 DIAGNOSIS — I10 ESSENTIAL HYPERTENSION: ICD-10-CM

## 2019-10-16 PROCEDURE — 99213 OFFICE O/P EST LOW 20 MIN: CPT

## 2019-10-16 NOTE — PROGRESS NOTES
"Subjective:       Patient ID: Azucena Morrison is a 69 y.o. female.    Chief Complaint: Non-healing Wound (abdomen)    /29/17: Pt re-admit for distal abdominal wound. Pt denies any fevers or chills but states she feels nauseous at times. Returned to [place] June 28, from [place], reports much trouble with abdominal wound while in [place].  7/6/17-- Patient scheduled for surgical drainage of wound Tuesday 7/11/17DB  7/19/17-- Patient post op clinic visit.  8/16/17: CT of abdomen and pelvis done 8/9/17 due to suspected fistula  8/23/17-- U/S of abdomen done today.  12/13/17 Wound vac with 20cc drainage in clinic today.  1/10/18: on PO abx  1/24/18: Fistulagram ordered per Dr. Horton. Patient still on PO abx  02/21/18 Patient is not on antibiotics at this time. BS fasting 135 per patient report this am.  03/07/18 Culture of Anterior Abdominal Wound is positive. No antibiotics at this time.  fasting per patient report.    03/21/18 Patient c/o nausea along with abdominal tenderness near abd midline wound. Patient states that pain level is 6 and that she passed two sero-sanguineous clots from wound. Patient is afebrile this am.  3/28/18: patient continue antibiotics and reports that pain is less than last weeks clinic visit. Drainage has decreased as well  04/04/18 Patient states that abdominal pain is "pressure" sensation and that when she pushes down on her abdomen on either side of abdominal wound she feels like she has to urinate. Patient reports pain level of 3 this am.  fasting this am per patient report.   5/2/18: Patient had Abdomina Toribio today before wound care. She had discontinued Bactrim PO per Dr. Horton's recommendation and culture results and is now taking Amoxicillin PO  6/20/18: Pt reports itching to wound and periwound   6/27/18: patient reports no new complaints with regards to her wound or abdomen, wound continues to decrease in size and drainage  8/1/18: Pt reports increased pain to abdomen " with nausea and emesis since thursday 7/26/18, denies any fevers, patient did no go to ER as instructed by home health nurse on Sat. 7/28/18.  8/29/18: Patient admitted to hospital 8/2 for abdominal wound complications. Surgery for abdominal abscess per Dr. Horton on 8/3. Patient placed on IV antibiotics and discharged home on 8/18 with [facility]  and PICC line to LUE. Patient currently on IV ertapenem. IV medication sent to patients home per [facility].   9/5/18: IV Ertapenem to continue 1 week. Culture sent to lab. [facility]  sent orders. PICC line intact to E.  9/12/18 Antibiotics completed. Culture results pending. 1 deep stitch remaining.  9/12/18: Culture positive for E. Coli, patient to continue Ertapenem IV antibiotics x 2weeks. [facility] notified  9/26/18: F/U abdominal wound, wound continues to improve. Patient will complete IV antibiotics today  10/3/18: patient c/o diarrhea for 2 days and nausea with some vomiting. Labs and stool culture ordered. IV antibiotics discontinued today  10/10/2018 patient will start on IV antibiotic Invaz IV once a day, pending PICC line placement, order placed today  for PICC per Dr. Horton.  10/24/18: patient on IV antibiotics, tolerating well. Continue for 1 week  11/7/2018: IV Antibiotic discontinued.  11/8/2018: PICC line to left upper arm discontinued by Home Health.  11/21/2018 F/u for abdominal wall fistula , c/o nausea  No vomiting , no fever  12/5/2018: F/u for abdominal wall fisula, c/o gas, Dr. Horton will order Bentyl. Surgery schedule for January 2019.  12/12/2018: F/u for abdominal wall fistula, c/o abdominal pain. Dr. Horton ordered Norco 5/325mg tab 1 PO every 4 hours as needed for pain and referred patient for x-ray of abdomen after clinic today.  12/19/2018: F/u for abdominal wall fisula, c/o nausea, Dr. Horton re ordered Ondansetron (Zofran) 8mg one tab by mouth every eight hours as needed for nausea. No changes in wound condition  from last visit noted in clinic today.  12/26/2018: F/u abdominal wall fistula drainage, no c/o at this time. Denies any abdominal pain or nausea. Dr. Horton recommends surgery first week of January 2019 for abdominal fistula treatment and possible colostomy placement, patient agree.  01/02/2019: F/u abdominal wall fistula. Dressing with large amount of drainage, malodorous, Dr. Horton is scheduling surgery for third week of January, 2019.  1/16/19: F/U Dr. Horton today in clinic, surgery scheduled for next Friday 1/25/19 with Dr. Horton.     01/23/2019: Presents to wound care clinic for f/u abdominal wound care tx. Surgery scheduled with Dr. Horton for Friday 01/25/2019. Dr. Horton explained Mrs. Morrison surgery procedure including possible complications, Nurse  (Kiswahili) present, patient verbalizes understanding of procedure including possible complications, all questions from patient were answered by Dr. Horton including blood sugar and blood pressure medications per patient's concerns. Consent for surgery and blood transfusion signed by patient, Dr. Horton and nurse witnessed.  Abdominal wound cultures collected per Dr. Horton, cultures sent to lab/micro pending results. Dr. Horton prescribed the following orders: fleet enema for Thursday 1/24/2019, clear liquid diet and not to eat after midnight all for 1/24/2019. Start taking Neomycin and erythromycin by mouth three times a day (1/24/2019) and dulcolax one tab by mouth twice a day, Mrs. Morrison voices understanding.     01/30/2019: F/U wound care treatment with Dr. Horton. Per pt, primary physician Dr. Pj Monae ordered for her to take potassium pills for three days, last day today and scheduled for lab work at primary physician clinic on 1/31/2019. Per pt. Feeling better, no more abdominal pain (went to ER 1/24/2019 and discharged 1/25/2019 c/o abd pain.). Dr. Horton awaiting on primary clearance to re-schedule surgery, per pt. She  "will call wound care clinic and Dr. Horton to make aware of clearance day. Continue with same orders for dressing change for Dr. Horton. Mrs. Morrison requesting gentamicin refill.     02/06/2019: F/u with Dr. Horton for wound care treatment. Mrs. Morrison brought to clinic a clearance for surgery by Dr. Pj Sanches dated 02/06/2019 "Hyperkalemia-Resolved K 4.8 2/1/2019, labs , Cr. 1.05, H/H 10/30. No contraindication to surgery, tight control of BS, Hydration." A copy of EKG (1/24/2019) and lab work from [facility] collected 1/31/2019, reported results 2/1/2019. Dr. Horton scheduled surgery for 02/22/2019, consent were signed on 01/23/2019, all questions were answered by Dr. Horton, this nurse  (Bhutanese) present. Education provided to patient on the importance of having a clear liquid diet the day before surgery 02/21/2019 as per Dr. Horton, new medications and preop preparation before surgery, verbalizes understanding. Dr. Horton ordered Norco 5/325 mg PO for pain, Dulcolax two tabs by mouth to take on 02/21/2019, Soap suds enema (SSE) on Thursday 02/21/2019, Golytely by mouth 2 liters on 2/21/2019, Erythromycin 500 mg one tab by mouth three times a day and Neomycin 1 gm by mouth three times a day.     2/13/2019: Presents to wound care clinic for a f/u with Dr. Horton for wound care treatment. No new orders in wound dressing change, reviewed preop orders with Mrs. Morrison per Dr. Horton, all questions answered. Surgery scheduled for 02/22/2019.  2/20/19: Continues wound care a previously ordered.  Depth measured per Dr. Horton 8cm.  Preop orders reviewed with patient who verbalized understanding.  Surgery scheduled for Fri 2/22/19.  Rx given to patient for Norco and Zofran.    03/06/19: Patient admitted to [facility] on 02/22/19 for exploratory laparotomy of abdomen per Dr. Horton. Patient discharged on 02/28/19 with home health and PICC line with IV antibiotics. Staples " "removed today in clinic. Patient denies any fever, chills, n&v; however, she states that she has "low energy." Continued with wound care as ordered.     3/13/2019: F/U with Dr. Horton for wound care treatment. Continue with same wound dressing change orders as per Dr. Horton, orders followed. Home Health to continue wound dressing change and lab draw for CBC weekly; continue IV Ertapenem/Invaz 1 gm IV daily as ordered.     3/20/2019: Clinic visit with Dr. Horton for abdominal abscess treatment. Presents with moderate draining from abdominal wound; wound size decreasing per measurement. Per Dr. Horton, apply one-piece system ostomy bag to abdominal wound for drain collection, may drain bag when it fills and may change every other day, continue applying gentamicin to wound bed before applying ostomy bag. Continue IV Invaz until completion. If ostomy bag not effective for draining collection, may return to previous orders for wound dressing change. Orders followed. Education provided to Mrs. Morrison on handwashing and ostomy care techniques, voices and demonstrates understanding.   3/27/19: Follow up with Dr. Horton today in clinic for abdominal abscess, moderated tanish yellow drainge present on dressing.  Wound slowly improving, patient refused one-piece ostomy bag stated " no it's too messy." requested to return to previous dressing prior to ostomy bag placement- iodoform gauze, aquacel extra, sm abd, and mefix tape.  Silver nitrate x1 per Dr. Horton today in clinic. Rx given to patient for PO Zofran.     04/03/2019: F/u clinic visit with Dr. Horton. Abdominal wound improving in size, picture on file. Wound cultures from abdominal abscess collected and sent to lab/micro, pending results. Mrs. Morrison states going out of the country ([place]) at the end of April and plans to stay there for approximately two months. Dr. Horton ordered IV antibiotic for two more weeks and new wound care dressing, orders " followed.     4/9/2019: Clinic visit with Dr. Horton.  Per Dr. Horton, wound deep measurement increased, draining present. Wound cultures from abdominal wound taken and sent to lab/micro, pending results. C/o abdominal pain and itching around wound, Rx for betamethasone cream 01.% and Norco 5/325mg give in clinic by Dr. Horton. Wound care dressing orders followed. Continue IV antibiotic as previously ordered.  04/17/19: F/u for non-healing wound to abdomen. Culture report negative. Dr. Horton ordered 1 more week of IV antibiotics. Continue with topical wound care as ordered.     4/24/2019: Clinic visit with Dr. Horton for abdominal wound treatment. Wound improvement present, pictures on file. Last IV antibiotic today 4/24/2019, [facility] to discontinue PICC line from left upper arm on 4/25/2019. Per Lyubov Prince, going to [place] on Friday 4/26/2019 and out of the country ([place]) on Monday 4/29/2019. This nurse spoke to Shaunna at [employer] (IV antibiotic delivery company) and informed last dose of antibiotic is today. Dr. Horton ordered Hydrocodone-acetaminophen (Norco) 5-325 mg (32 tabs), Bentyl 10 mg (120 capsules) and Gentamycin ointment. Education provided on the importance of eating food that contains iron (to prevent anemia) as well as eating meat and taking her blood pressure medications (blood pressure medication) on time, voices understanding. All questions answered by Dr. Horton. [facility] outpatient confirmed Betamethasone cream ordered on 4/10/2019 is ready for . Instructions and education provided to Mrs. Morrison on abdominal wound dressing changes, hand washing techniques and medication use, effects and side effects, voices and demonstrates understanding. Discharged home on stable conditions, [facility] will give wound care clinic a call when she is back from [place] in few months from now.     7/17/19: Readmit today to wound care clinic.  Patient was recently out of the  country visiting family in [place].  Patient complains of pain and nausea as on prior visits.  Dr. Horton seen patient today discussed with patient again placing colostomy patient refused.  Culture of wound taken, Dr. Horton restarted patient on zofran po for nausea, and Norco for pain. [facility] restarted today in clinic on Mondays and Fridays and prn. Orders given to gently pack wound with aquacel ag rope, cover with aquacel extra, 4x4 gauze, small abd pad and mefix tape change dressing ever other day by [facility] and Prn per patient.     7/24/2019: Clinic visit with Dr. Horton. Continue with dressing change as ordered. Wound cultures reviewed with patient, lab work ordered by Dr. Horton, no fever present or reported at this time. C/o decrease appetite, medication periactin prescribed. Requesting needles for insulin pen, order prescribed by Dr. Horton.      7/31/2019: F/U clinic visit with Dr. Horton. C/o of excruciated back pain 10/10, not feeling well, lot of gas and left upper quadrant discomfort. New orders prescribed: Augmentin 875-125 mg by mouth twice a day. Bentyl 10 mg one tab by mouth 4 times a day. Tramadol 50 mg one tab by mouth every 6 hours as needed for pain. Abdomen ultrasound. Wound care dressing completed as ordered.     8/7/2019: Clinic visit with Dr. Horton, denies any pain at this time. Wound care dressing done as ordered, no changes in wound size from last visit noted. Continue taking oral antiiotic as ordered, pending abdominal ultrasound, scheduled for 8/13/2019.     8/14/2019: F/U clinic visit with Dr. Horton. Admitted and discharged from emergency department this morning with abdominal pain, CT and ultrasound in filed. Dr. Horton performed a small drainage from the abdominal wound, moderate amount of creamy, serosanguineous fluid from abdominal wound present. Iodoform gauze packed into her wound, dressing changed as ordered. Wound cultures collected and sent to  lab/micro, pending results. Rx for Norco 5/325 mg one tab PO every 4 hours PRN as needed # 24 given to pt by Dr. Horton.      8/21/2019: Clinic visit with Dr. Horton. Wound dressing changed as ordered.      8/28/2019: Clinic visit with Dr. Horton. Continue with dressing change as ordered.      09/04/19: F/u wound care visit with Dr. Horton. Continue current wound care treatment. CBC and CMP ordered today     9/11/2019: Clinic visit with Dr. Horton. Continue with PO antibiotics, and wound dressing changed as ordered. New medication prescribed for arthritis, and pain, RX provided.    9/18/2019: Clinic visit with Dr. Horton. Wound dressing changed as ordered. Silver nitrate x 1 used by Dr. Horton, tolerated.  9/25/19: Patient seen today in clinic by Dr. Horton.  No changes to wound, wound care continued as ordered.  Silver Nitrate x 1 by Dr. Horton patient tolereated well. Rx sent to patient's pharmacy for Nausea medicine.   10/9/19: Patient seen today by Dr. Horton today in clinic. No changes wound. Silver Nitrate x 1 per Dr. Horton today in clinic, wound care as ordered.  Rx sent to pt's pharmacy for tramadol po  And Bentyl Po. F/u 1 week Dr. Horton  10/16/19: F/U with Dr. Horton. Silver nitrate x 2. Wound remains the same. Cont. POC.    Review of Systems   Constitutional: Negative.    HENT: Negative.    Eyes: Negative.    Respiratory: Negative.    Cardiovascular: Negative.    Gastrointestinal: Negative.    Genitourinary: Negative.    Musculoskeletal: Negative.    Skin: Negative.    Neurological: Negative.    Psychiatric/Behavioral: Negative.        Objective:      Physical Exam   Constitutional: She is oriented to person, place, and time. She appears well-developed and well-nourished.   HENT:   Head: Normocephalic.   Eyes: Pupils are equal, round, and reactive to light. Conjunctivae and EOM are normal.   Neck: Normal range of motion. Neck supple.   Cardiovascular: Normal rate, regular rhythm, normal  heart sounds and intact distal pulses.   Pulmonary/Chest: Effort normal and breath sounds normal.   Abdominal: Soft. Bowel sounds are normal.   Musculoskeletal: Normal range of motion.   Neurological: She is alert and oriented to person, place, and time. She has normal reflexes.   Skin: Skin is warm and dry.       Assessment:       1. Abscess of abdominal wall           Incision/Site 02/22/19 1146 Abdomen midline midline (Active)   02/22/19 1146    Present Prior to Hospital Arrival?:    Side:    Location: Abdomen   Orientation: midline   Incision Type: midline   Closure Method:    Additional Comments:    Removal Indication and Assessment:    Wound Outcome: Palliative   Removal Indications:    Dressing Appearance Moist drainage 10/16/2019 11:45 AM   Drainage Amount Small 10/16/2019 11:45 AM   Drainage Characteristics/Odor Serosanguineous 10/16/2019 11:45 AM   Appearance Pink;Yellow;Moist 10/16/2019 11:45 AM   Red (%), Wound Tissue Color 80 % 10/16/2019 11:45 AM   Yellow (%), Wound Tissue Color 20 % 10/16/2019 11:45 AM   Periwound Area Intact;Dry;Pink 10/16/2019 11:45 AM   Wound Edges Irregular 10/16/2019 11:45 AM   Wound Length (cm) 0.5 cm 10/16/2019 11:45 AM   Wound Width (cm) 0.5 cm 10/16/2019 11:45 AM   Wound Depth (cm) 1.3 cm 10/16/2019 11:45 AM   Wound Volume (cm^3) 0.32 cm^3 10/16/2019 11:45 AM   Wound Surface Area (cm^2) 0.25 cm^2 10/16/2019 11:45 AM   Care Cleansed with:;Sterile normal saline 10/16/2019 11:45 AM   Dressing Changed;Abd pad;Gauze;Other (see comments) 10/16/2019 11:45 AM   Packing gauze, iodoform;Incision packed with 10/16/2019 11:45 AM   Packing Inserted  1 10/16/2019 11:45 AM   Packing Removed 1 10/16/2019 11:45 AM   Periwound Care Absorptive dressing applied;Skin barrier film applied 10/16/2019 11:45 AM   Dressing Change Due 10/18/19 10/16/2019 11:45 AM       Abdominal wall abscess  - Primary    Clean wound with normal saline  Lidocaine 2% gel or 4 % Topical solution: PRN  Silver Nitrate x 2  per Dr. Horton  Other: Cavilon to chauncey wound  Primary dressing: Apply gentamycin to wound bed. Pack wound with Iodoform gauze, cover with 4x4 gauze, small ABD pad, secure with mefix tape.  Frequency: Mondays and Fridays by [facility] and on Wednesdays at wound care clinic and prn need.  Other orders: Continue po antibiotic Augmentin.     Follow up in about 1 week (around 10/23/2019).    Plan:                 1 week Dr. Horton

## 2019-10-17 ENCOUNTER — TELEPHONE (OUTPATIENT)
Dept: HOME HEALTH SERVICES | Facility: HOSPITAL | Age: 69
End: 2019-10-17

## 2019-10-23 ENCOUNTER — HOSPITAL ENCOUNTER (OUTPATIENT)
Dept: WOUND CARE | Facility: HOSPITAL | Age: 69
Discharge: HOME | End: 2019-10-23
Attending: SURGERY
Payer: MEDICARE

## 2019-10-23 VITALS
TEMPERATURE: 98 F | HEART RATE: 75 BPM | SYSTOLIC BLOOD PRESSURE: 151 MMHG | DIASTOLIC BLOOD PRESSURE: 76 MMHG | HEIGHT: 62 IN | BODY MASS INDEX: 30.91 KG/M2 | WEIGHT: 168 LBS

## 2019-10-23 DIAGNOSIS — E11.9 TYPE 2 DIABETES MELLITUS WITHOUT COMPLICATION, WITHOUT LONG-TERM CURRENT USE OF INSULIN: ICD-10-CM

## 2019-10-23 DIAGNOSIS — L02.211 ABDOMINAL WALL ABSCESS: ICD-10-CM

## 2019-10-23 DIAGNOSIS — K56.609 SMALL BOWEL OBSTRUCTION: ICD-10-CM

## 2019-10-23 DIAGNOSIS — Z90.49 HISTORY OF HEMICOLECTOMY: ICD-10-CM

## 2019-10-23 DIAGNOSIS — K63.2 COLOCUTANEOUS FISTULA: ICD-10-CM

## 2019-10-23 DIAGNOSIS — I10 ESSENTIAL HYPERTENSION: ICD-10-CM

## 2019-10-23 DIAGNOSIS — K63.2 COLONIC FISTULA: ICD-10-CM

## 2019-10-23 DIAGNOSIS — L02.211 ABSCESS OF ABDOMINAL WALL: Primary | ICD-10-CM

## 2019-10-23 PROCEDURE — 99213 OFFICE O/P EST LOW 20 MIN: CPT

## 2019-10-23 NOTE — PROGRESS NOTES
"[facility]                Progress Note    Subjective:       Patient ID: Azucena Morrison is a 69 y.o. female.    Chief Complaint: Non-healing Wound Follow Up    Chief Complaint: Non-healing Wound (abdomen)     /29/17: Pt re-admit for distal abdominal wound. Pt denies any fevers or chills but states she feels nauseous at times. Returned to [place] June 28, from [place], reports much trouble with abdominal wound while in [place].  7/6/17-- Patient scheduled for surgical drainage of wound Tuesday 7/11/17DB 7/19/17-- Patient post op clinic visit.  8/16/17: CT of abdomen and pelvis done 8/9/17 due to suspected fistula  8/23/17-- U/S of abdomen done today.  12/13/17 Wound vac with 20cc drainage in clinic today.  1/10/18: on PO abx  1/24/18: Fistulagram ordered per Dr. Horton. Patient still on PO abx  02/21/18 Patient is not on antibiotics at this time. BS fasting 135 per patient report this am.  03/07/18 Culture of Anterior Abdominal Wound is positive. No antibiotics at this time.  fasting per patient report.    03/21/18 Patient c/o nausea along with abdominal tenderness near abd midline wound. Patient states that pain level is 6 and that she passed two sero-sanguineous clots from wound. Patient is afebrile this am.  3/28/18: patient continue antibiotics and reports that pain is less than last weeks clinic visit. Drainage has decreased as well  04/04/18 Patient states that abdominal pain is "pressure" sensation and that when she pushes down on her abdomen on either side of abdominal wound she feels like she has to urinate. Patient reports pain level of 3 this am.  fasting this am per patient report.   5/2/18: Patient had Abdomina Toribio today before wound care. She had discontinued Bactrim PO per Dr. Horton's recommendation and culture results and is now taking Amoxicillin PO  6/20/18: Pt reports itching to wound and periwound   6/27/18: patient reports no " new complaints with regards to her wound or abdomen, wound continues to decrease in size and drainage  8/1/18: Pt reports increased pain to abdomen with nausea and emesis since thursday 7/26/18, denies any fevers, patient did no go to ER as instructed by home health nurse on Sat. 7/28/18.  8/29/18: Patient admitted to hospital 8/2 for abdominal wound complications. Surgery for abdominal abscess per Dr. Horton on 8/3. Patient placed on IV antibiotics and discharged home on 8/18 with [facility] and PICC line to [facility]. Patient currently on IV ertapenem. IV medication sent to patients home per [facility].   9/5/18: IV Ertapenem to continue 1 week. Culture sent to lab. [facility]  sent orders. PICC line intact to [facility].  9/12/18 Antibiotics completed. Culture results pending. 1 deep stitch remaining.  9/12/18: Culture positive for E. Coli, patient to continue Ertapenem IV antibiotics x 2weeks. [facility] notified  9/26/18: F/U abdominal wound, wound continues to improve. Patient will complete IV antibiotics today  10/3/18: patient c/o diarrhea for 2 days and nausea with some vomiting. Labs and stool culture ordered. IV antibiotics discontinued today  10/10/2018 patient will start on IV antibiotic Invaz IV once a day, pending PICC line placement, order placed today  for PICC per Dr. Horton.  10/24/18: patient on IV antibiotics, tolerating well. Continue for 1 week  11/7/2018: IV Antibiotic discontinued.  11/8/2018: PICC line to left upper arm discontinued by Home Health.  11/21/2018 F/u for abdominal wall fistula , c/o nausea  No vomiting , no fever  12/5/2018: F/u for abdominal wall fisula, c/o gas, Dr. Horton will order Bentyl. Surgery schedule for January 2019.  12/12/2018: F/u for abdominal wall fistula, c/o abdominal pain. Dr. Horton ordered Norco 5/325mg tab 1 PO every 4 hours as needed for pain and referred patient for x-ray of abdomen after clinic today.  12/19/2018: F/u for abdominal wall fisula,  c/o nausea, Dr. Horton re ordered Ondansetron (Zofran) 8mg one tab by mouth every eight hours as needed for nausea. No changes in wound condition from last visit noted in clinic today.  12/26/2018: F/u abdominal wall fistula drainage, no c/o at this time. Denies any abdominal pain or nausea. Dr. Horton recommends surgery first week of January 2019 for abdominal fistula treatment and possible colostomy placement, patient agree.  01/02/2019: F/u abdominal wall fistula. Dressing with large amount of drainage, malodorous, Dr. Horton is scheduling surgery for third week of January, 2019.  1/16/19: F/U Dr. Horton today in clinic, surgery scheduled for next Friday 1/25/19 with Dr. Horton.     01/23/2019: Presents to wound care clinic for f/u abdominal wound care tx. Surgery scheduled with Dr. Horton for Friday 01/25/2019. Dr. Horton explained Mrs. Morrison surgery procedure including possible complications, Nurse  (Chinese) present, patient verbalizes understanding of procedure including possible complications, all questions from patient were answered by Dr. Horton including blood sugar and blood pressure medications per patient's concerns. Consent for surgery and blood transfusion signed by patient, Dr. Horton and nurse witnessed.  Abdominal wound cultures collected per Dr. Horton, cultures sent to lab/micro pending results. Dr. Horton prescribed the following orders: fleet enema for Thursday 1/24/2019, clear liquid diet and not to eat after midnight all for 1/24/2019. Start taking Neomycin and erythromycin by mouth three times a day (1/24/2019) and dulcolax one tab by mouth twice a day, Mrs. Morrison voices understanding.     01/30/2019: F/U wound care treatment with Dr. Horton. Per pt, primary physician Dr. Pj Monae ordered for her to take potassium pills for three days, last day today and scheduled for lab work at primary physician clinic on 1/31/2019. Per pt. Feeling better, no more abdominal  "pain (went to ER 1/24/2019 and discharged 1/25/2019 c/o abd pain.). Dr. Horton awaiting on primary clearance to re-schedule surgery, per pt. She will call wound care clinic and Dr. Horton to make aware of clearance day. Continue with same orders for dressing change for Dr. Horton. Mrs. Morrison requesting gentamicin refill.     02/06/2019: F/u with Dr. Horton for wound care treatment. Mrs. Morrison brought to clinic a clearance for surgery by Dr. Pj Sanches dated 02/06/2019 "Hyperkalemia-Resolved K 4.8 2/1/2019, labs , Cr. 1.05, H/H 10/30. No contraindication to surgery, tight control of BS, Hydration." A copy of EKG (1/24/2019) and lab work from [facility] collected 1/31/2019, reported results 2/1/2019. Dr. Horton scheduled surgery for 02/22/2019, consent were signed on 01/23/2019, all questions were answered by Dr. Horton, this nurse  (Frisian) present. Education provided to patient on the importance of having a clear liquid diet the day before surgery 02/21/2019 as per Dr. Horton, new medications and preop preparation before surgery, verbalizes understanding. Dr. Horton ordered Norco 5/325 mg PO for pain, Dulcolax two tabs by mouth to take on 02/21/2019, Soap suds enema (SSE) on Thursday 02/21/2019, Golytely by mouth 2 liters on 2/21/2019, Erythromycin 500 mg one tab by mouth three times a day and Neomycin 1 gm by mouth three times a day.     2/13/2019: Presents to wound care clinic for a f/u with Dr. Horton for wound care treatment. No new orders in wound dressing change, reviewed preop orders with Mrs. Morrison per Dr. Horton, all questions answered. Surgery scheduled for 02/22/2019.  2/20/19: Continues wound care a previously ordered.  Depth measured per Dr. Horton 8cm.  Preop orders reviewed with patient who verbalized understanding.  Surgery scheduled for Fri 2/22/19.  Rx given to patient for Norco and Zofran.    03/06/19: Patient admitted to [facility] on 02/22/19 for " "exploratory laparotomy of abdomen per Dr. Horton. Patient discharged on 02/28/19 with home health and PICC line with IV antibiotics. Staples removed today in clinic. Patient denies any fever, chills, n&v; however, she states that she has "low energy." Continued with wound care as ordered.     3/13/2019: F/U with Dr. Horton for wound care treatment. Continue with same wound dressing change orders as per Dr. Horton, orders followed. Home Health to continue wound dressing change and lab draw for CBC weekly; continue IV Ertapenem/Invaz 1 gm IV daily as ordered.     3/20/2019: Clinic visit with Dr. Horton for abdominal abscess treatment. Presents with moderate draining from abdominal wound; wound size decreasing per measurement. Per Dr. Horton, apply one-piece system ostomy bag to abdominal wound for drain collection, may drain bag when it fills and may change every other day, continue applying gentamicin to wound bed before applying ostomy bag. Continue IV Invaz until completion. If ostomy bag not effective for draining collection, may return to previous orders for wound dressing change. Orders followed. Education provided to Mrs. Morrison on handwashing and ostomy care techniques, voices and demonstrates understanding.   3/27/19: Follow up with Dr. Horton today in clinic for abdominal abscess, moderated tanish yellow drainge present on dressing.  Wound slowly improving, patient refused one-piece ostomy bag stated " no it's too messy." requested to return to previous dressing prior to ostomy bag placement- iodoform gauze, aquacel extra, sm abd, and mefix tape.  Silver nitrate x1 per Dr. Horton today in clinic. Rx given to patient for PO Zofran.     04/03/2019: F/u clinic visit with Dr. Horton. Abdominal wound improving in size, picture on file. Wound cultures from abdominal abscess collected and sent to lab/micro, pending results. Mrs. Morrison states going out of the country ([place]) at the end of April and " plans to stay there for approximately two months. Dr. Horton ordered IV antibiotic for two more weeks and new wound care dressing, orders followed.     4/9/2019: Clinic visit with Dr. Horton.  Per Dr. Horton, wound deep measurement increased, draining present. Wound cultures from abdominal wound taken and sent to lab/micro, pending results. C/o abdominal pain and itching around wound, Rx for betamethasone cream 01.% and Norco 5/325mg give in clinic by Dr. Horton. Wound care dressing orders followed. Continue IV antibiotic as previously ordered.  04/17/19: F/u for non-healing wound to abdomen. Culture report negative. Dr. Horton ordered 1 more week of IV antibiotics. Continue with topical wound care as ordered.     4/24/2019: Clinic visit with Dr. Horton for abdominal wound treatment. Wound improvement present, pictures on file. Last IV antibiotic today 4/24/2019, [facility] to discontinue PICC line from left upper arm on 4/25/2019. Per Lyubov Prince, going to [place] on Friday 4/26/2019 and out of the country ([place]) on Monday 4/29/2019. This nurse spoke to Shaunna at [employer] (IV antibiotic delivery company) and informed last dose of antibiotic is today. Dr. Horton ordered Hydrocodone-acetaminophen (Norco) 5-325 mg (32 tabs), Bentyl 10 mg (120 capsules) and Gentamycin ointment. Education provided on the importance of eating food that contains iron (to prevent anemia) as well as eating meat and taking her blood pressure medications (blood pressure medication) on time, voices understanding. All questions answered by Dr. Horton. [facility] outpatient confirmed Betamethasone cream ordered on 4/10/2019 is ready for . Instructions and education provided to Mrs. Morrison on abdominal wound dressing changes, hand washing techniques and medication use, effects and side effects, voices and demonstrates understanding. Discharged home on stable conditions, [facility] will give wound care clinic a  call when she is back from [place] in few months from now.     7/17/19: Readmit today to wound care clinic.  Patient was recently out of the country visiting family in [place].  Patient complains of pain and nausea as on prior visits.  Dr. Horton seen patient today discussed with patient again placing colostomy patient refused.  Culture of wound taken, Dr. Horton restarted patient on zofran po for nausea, and Norco for pain. [facility] restarted today in clinic on Mondays and Fridays and prn. Orders given to gently pack wound with aquacel ag rope, cover with aquacel extra, 4x4 gauze, small abd pad and mefix tape change dressing ever other day by [facility] and Prn per patient.     7/24/2019: Clinic visit with Dr. Horton. Continue with dressing change as ordered. Wound cultures reviewed with patient, lab work ordered by Dr. Horton, no fever present or reported at this time. C/o decrease appetite, medication periactin prescribed. Requesting needles for insulin pen, order prescribed by Dr. Horton.      7/31/2019: F/U clinic visit with Dr. Horton. C/o of excruciated back pain 10/10, not feeling well, lot of gas and left upper quadrant discomfort. New orders prescribed: Augmentin 875-125 mg by mouth twice a day. Bentyl 10 mg one tab by mouth 4 times a day. Tramadol 50 mg one tab by mouth every 6 hours as needed for pain. Abdomen ultrasound. Wound care dressing completed as ordered.     8/7/2019: Clinic visit with Dr. Horton, denies any pain at this time. Wound care dressing done as ordered, no changes in wound size from last visit noted. Continue taking oral antiiotic as ordered, pending abdominal ultrasound, scheduled for 8/13/2019.     8/14/2019: F/U clinic visit with Dr. Horton. Admitted and discharged from emergency department this morning with abdominal pain, CT and ultrasound in filed. Dr. Horton performed a small drainage from the abdominal wound, moderate amount of creamy, serosanguineous  fluid from abdominal wound present. Iodoform gauze packed into her wound, dressing changed as ordered. Wound cultures collected and sent to lab/micro, pending results. Rx for Norco 5/325 mg one tab PO every 4 hours PRN as needed # 24 given to pt by Dr. Horton.      8/21/2019: Clinic visit with Dr. Horton. Wound dressing changed as ordered.      8/28/2019: Clinic visit with Dr. Horton. Continue with dressing change as ordered.      09/04/19: F/u wound care visit with Dr. Horton. Continue current wound care treatment. CBC and CMP ordered today     9/11/2019: Clinic visit with Dr. Horton. Continue with PO antibiotics, and wound dressing changed as ordered. New medication prescribed for arthritis, and pain, RX provided.     9/18/2019: Clinic visit with Dr. Horton. Wound dressing changed as ordered. Silver nitrate x 1 used by Dr. Horton, tolerated.  9/25/19: Patient seen today in clinic by Dr. Horton.  No changes to wound, wound care continued as ordered.  Silver Nitrate x 1 by Dr. Horton patient tolereated well. Rx sent to patient's pharmacy for Nausea medicine.   10/9/19: Patient seen today by Dr. Horton today in clinic. No changes wound. Silver Nitrate x 1 per Dr. Horton today in clinic, wound care as ordered.  Rx sent to pt's pharmacy for tramadol po  And Bentyl Po. F/u 1 week Dr. Horton  10/16/19: F/U with Dr. Horton. Silver nitrate x 2. Wound remains the same. Cont. POC.  10/23/19:   Patient seen today by Dr. Horton today in clinic. No changes wound care. Depth measurement per Dr. Horton.  Silver Nitrate x 1 per Dr. Horton today in clinic. Rx given to patient for refill of gentamicin.  Fu in 1 week.          Review of Systems   Constitutional: Negative.    HENT: Negative.    Eyes: Negative.    Respiratory: Negative.    Cardiovascular: Negative.    Gastrointestinal: Negative.    Genitourinary: Negative.    Musculoskeletal: Negative.    Skin: Negative.    Neurological: Negative.     Psychiatric/Behavioral: Negative.          Objective:        Physical Exam   Constitutional: She is oriented to person, place, and time. She appears well-developed and well-nourished.   HENT:   Head: Normocephalic.   Eyes: Pupils are equal, round, and reactive to light. Conjunctivae and EOM are normal.   Neck: Normal range of motion. Neck supple.   Cardiovascular: Normal rate, regular rhythm, normal heart sounds and intact distal pulses.   Pulmonary/Chest: Effort normal and breath sounds normal.   Abdominal: Soft. Bowel sounds are normal.   Musculoskeletal: Normal range of motion.   Neurological: She is alert and oriented to person, place, and time. She has normal reflexes.   Skin: Skin is warm and dry.       Vitals:    10/23/19 0846   BP: (!) 151/76   Pulse: 75   Temp: 98.2 °F (36.8 °C)       Assessment:           ICD-10-CM ICD-9-CM   1. Abscess of abdominal wall L02.211 682.2   2. History of hemicolectomy Z90.49 V15.29   3. Essential hypertension I10 401.9   4. Type 2 diabetes mellitus without complication, without long-term current use of insulin E11.9 250.00   5. 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 Abdominal wall abscess L02.211 682.2   7. 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 Small bowel obstruction K56.609 560.9            Incision/Site 02/22/19 1146 Abdomen midline midline (Active)   02/22/19 1146    Present Prior to Hospital Arrival?:    Side:    Location: Abdomen   Orientation: midline   Incision Type: midline   Closure Method:    Additional Comments:    Removal Indication and Assessment:    Wound Outcome: Palliative   Removal Indications:    Dressing Appearance Moist drainage;Intact 10/23/2019  8:00 AM   Drainage Amount Small 10/23/2019  8:00 AM   Drainage Characteristics/Odor Serosanguineous 10/23/2019  8:00 AM   Appearance Pink;Red;Wet 10/23/2019  8:00 AM   Red (%), Wound Tissue Color 100 % 10/23/2019  8:00 AM   Periwound Area Intact;Dry;Pink;Scar tissue 10/23/2019  8:00 AM   Wound Edges Defined;Open  10/23/2019  8:00 AM   Wound Length (cm) 0.3 cm 10/23/2019  8:00 AM   Wound Width (cm) 0.5 cm 10/23/2019  8:00 AM   Wound Depth (cm) 2.7 cm 10/23/2019  8:00 AM   Wound Volume (cm^3) 0.4 cm^3 10/23/2019  8:00 AM   Wound Surface Area (cm^2) 0.15 cm^2 10/23/2019  8:00 AM   Care Cleansed with:;Sterile normal saline 10/23/2019  8:00 AM   Dressing Applied;Changed;Abd pad;Gauze 10/23/2019  8:00 AM   Packing Incision packed with;gauze, iodoform 10/23/2019  8:00 AM   Packing Inserted  1 10/23/2019  8:00 AM   Packing Removed 1 10/23/2019  8:00 AM   Periwound Care Absorptive dressing applied;Skin barrier film applied 10/23/2019  8:00 AM   Dressing Change Due 10/25/19 10/23/2019  8:00 AM       Abdominal wall abscess  - Primary    Clean wound with normal saline  Lidocaine 2% gel or 4 % Topical solution: PRN  Silver Nitrate x 1 per Dr. Horton  Other: Cavilon to chauncey wound  Primary dressing: Apply gentamycin to wound bed. Pack wound with Iodoform gauze, cover with 4x4 gauze, small ABD pad, secure with mefix tape.  Frequency: Mondays and Fridays by [facility] and on Wednesdays at wound care clinic and prn need.  Other orders: Continue po antibiotic Augmentin.             Plan:          Follow up in about 1 week (around 10/30/2019).

## 2019-10-24 ENCOUNTER — TELEPHONE (OUTPATIENT)
Dept: HOME HEALTH SERVICES | Facility: HOSPITAL | Age: 69
End: 2019-10-24

## 2019-10-30 ENCOUNTER — HOSPITAL ENCOUNTER (OUTPATIENT)
Dept: WOUND CARE | Facility: HOSPITAL | Age: 69
Discharge: HOME | End: 2019-10-30
Attending: SURGERY
Payer: MEDICARE

## 2019-10-30 VITALS
SYSTOLIC BLOOD PRESSURE: 168 MMHG | DIASTOLIC BLOOD PRESSURE: 89 MMHG | WEIGHT: 168 LBS | TEMPERATURE: 99 F | BODY MASS INDEX: 30.91 KG/M2 | HEART RATE: 71 BPM | HEIGHT: 62 IN

## 2019-10-30 DIAGNOSIS — L02.211 ABDOMINAL WALL ABSCESS: ICD-10-CM

## 2019-10-30 DIAGNOSIS — E11.622 TYPE 2 DIABETES MELLITUS WITH OTHER SKIN ULCER, WITH LONG-TERM CURRENT USE OF INSULIN: ICD-10-CM

## 2019-10-30 DIAGNOSIS — K63.2 COLOCUTANEOUS FISTULA: ICD-10-CM

## 2019-10-30 DIAGNOSIS — E11.620 TYPE 2 DIABETES MELLITUS WITH DIABETIC DERMATITIS, WITHOUT LONG-TERM CURRENT USE OF INSULIN: ICD-10-CM

## 2019-10-30 DIAGNOSIS — L02.211 ABSCESS OF ABDOMINAL WALL: Primary | ICD-10-CM

## 2019-10-30 DIAGNOSIS — Z79.4 TYPE 2 DIABETES MELLITUS WITH OTHER SKIN ULCER, WITH LONG-TERM CURRENT USE OF INSULIN: ICD-10-CM

## 2019-10-30 DIAGNOSIS — T14.8XXA DRAINAGE FROM WOUND: ICD-10-CM

## 2019-10-30 DIAGNOSIS — K56.609 SMALL BOWEL OBSTRUCTION: ICD-10-CM

## 2019-10-30 PROCEDURE — 99213 OFFICE O/P EST LOW 20 MIN: CPT

## 2019-10-30 NOTE — PROGRESS NOTES
"Subjective:       Patient ID: Azucena Morrison is a 69 y.o. female.    Chief Complaint: Non-healing Wound Follow Up    Chief Complaint: Non-healing Wound (abdomen)     /29/17: Pt re-admit for distal abdominal wound. Pt denies any fevers or chills but states she feels nauseous at times. Returned to [place] June 28, from [place], reports much trouble with abdominal wound while in [place].  7/6/17-- Patient scheduled for surgical drainage of wound Tuesday 7/11/17DB  7/19/17-- Patient post op clinic visit.  8/16/17: CT of abdomen and pelvis done 8/9/17 due to suspected fistula  8/23/17-- U/S of abdomen done today.  12/13/17 Wound vac with 20cc drainage in clinic today.  1/10/18: on PO abx  1/24/18: Fistulagram ordered per Dr. Horton. Patient still on PO abx  02/21/18 Patient is not on antibiotics at this time. BS fasting 135 per patient report this am.  03/07/18 Culture of Anterior Abdominal Wound is positive. No antibiotics at this time.  fasting per patient report.    03/21/18 Patient c/o nausea along with abdominal tenderness near abd midline wound. Patient states that pain level is 6 and that she passed two sero-sanguineous clots from wound. Patient is afebrile this am.  3/28/18: patient continue antibiotics and reports that pain is less than last weeks clinic visit. Drainage has decreased as well  04/04/18 Patient states that abdominal pain is "pressure" sensation and that when she pushes down on her abdomen on either side of abdominal wound she feels like she has to urinate. Patient reports pain level of 3 this am.  fasting this am per patient report.   5/2/18: Patient had Abdomina Toribio today before wound care. She had discontinued Bactrim PO per Dr. Horton's recommendation and culture results and is now taking Amoxicillin PO  6/20/18: Pt reports itching to wound and periwound   6/27/18: patient reports no new complaints with regards to her wound or abdomen, wound continues to decrease in size and " drainage  8/1/18: Pt reports increased pain to abdomen with nausea and emesis since thursday 7/26/18, denies any fevers, patient did no go to ER as instructed by home health nurse on Sat. 7/28/18.  8/29/18: Patient admitted to hospital 8/2 for abdominal wound complications. Surgery for abdominal abscess per Dr. Horton on 8/3. Patient placed on IV antibiotics and discharged home on 8/18 with [facility]  and PICC line to [facility]. Patient currently on IV ertapenem. IV medication sent to patients home per [facility].   9/5/18: IV Ertapenem to continue 1 week. Culture sent to lab. [facility]  sent orders. PICC line intact to [facility].  9/12/18 Antibiotics completed. Culture results pending. 1 deep stitch remaining.  9/12/18: Culture positive for E. Coli, patient to continue Ertapenem IV antibiotics x 2weeks. Pending sale to [facility] notified  9/26/18: F/U abdominal wound, wound continues to improve. Patient will complete IV antibiotics today  10/3/18: patient c/o diarrhea for 2 days and nausea with some vomiting. Labs and stool culture ordered. IV antibiotics discontinued today  10/10/2018 patient will start on IV antibiotic Invaz IV once a day, pending PICC line placement, order placed today  for PICC per Dr. Horton.  10/24/18: patient on IV antibiotics, tolerating well. Continue for 1 week  11/7/2018: IV Antibiotic discontinued.  11/8/2018: PICC line to left upper arm discontinued by Home Health.  11/21/2018 F/u for abdominal wall fistula , c/o nausea  No vomiting , no fever  12/5/2018: F/u for abdominal wall fisula, c/o gas, Dr. Horton will order Bentyl. Surgery schedule for January 2019.  12/12/2018: F/u for abdominal wall fistula, c/o abdominal pain. Dr. Horton ordered Norco 5/325mg tab 1 PO every 4 hours as needed for pain and referred patient for x-ray of abdomen after clinic today.  12/19/2018: F/u for abdominal wall fisula, c/o nausea, Dr. Horton re ordered Ondansetron (Zofran) 8mg one tab by mouth every eight  hours as needed for nausea. No changes in wound condition from last visit noted in clinic today.  12/26/2018: F/u abdominal wall fistula drainage, no c/o at this time. Denies any abdominal pain or nausea. Dr. Horton recommends surgery first week of January 2019 for abdominal fistula treatment and possible colostomy placement, patient agree.  01/02/2019: F/u abdominal wall fistula. Dressing with large amount of drainage, malodorous, Dr. Horton is scheduling surgery for third week of January, 2019.  1/16/19: F/U Dr. Horton today in clinic, surgery scheduled for next Friday 1/25/19 with Dr. Horton.     01/23/2019: Presents to wound care clinic for f/u abdominal wound care tx. Surgery scheduled with Dr. Horton for Friday 01/25/2019. Dr. Horton explained Mrs. Morrison surgery procedure including possible complications, Nurse  (Malagasy) present, patient verbalizes understanding of procedure including possible complications, all questions from patient were answered by Dr. Horton including blood sugar and blood pressure medications per patient's concerns. Consent for surgery and blood transfusion signed by patient, Dr. Horton and nurse witnessed.  Abdominal wound cultures collected per Dr. Horton, cultures sent to lab/micro pending results. Dr. Horton prescribed the following orders: fleet enema for Thursday 1/24/2019, clear liquid diet and not to eat after midnight all for 1/24/2019. Start taking Neomycin and erythromycin by mouth three times a day (1/24/2019) and dulcolax one tab by mouth twice a day, Mrs. Morrison voices understanding.     01/30/2019: F/U wound care treatment with Dr. Horton. Per pt, primary physician Dr. Pj Monae ordered for her to take potassium pills for three days, last day today and scheduled for lab work at primary physician clinic on 1/31/2019. Per pt. Feeling better, no more abdominal pain (went to ER 1/24/2019 and discharged 1/25/2019 c/o abd pain.). Dr. Horton awaiting  "on primary clearance to re-schedule surgery, per pt. She will call wound care clinic and Dr. Horton to make aware of clearance day. Continue with same orders for dressing change for Dr. Horton. Mrs. Morrison requesting gentamicin refill.     02/06/2019: F/u with Dr. Horton for wound care treatment. Mrs. Morrison brought to clinic a clearance for surgery by Dr. Pj Sanches dated 02/06/2019 "Hyperkalemia-Resolved K 4.8 2/1/2019, labs , Cr. 1.05, H/H 10/30. No contraindication to surgery, tight control of BS, Hydration." A copy of EKG (1/24/2019) and lab work from [facility] collected 1/31/2019, reported results 2/1/2019. Dr. Horton scheduled surgery for 02/22/2019, consent were signed on 01/23/2019, all questions were answered by Dr. Horton, this nurse  (Korean) present. Education provided to patient on the importance of having a clear liquid diet the day before surgery 02/21/2019 as per Dr. Horton, new medications and preop preparation before surgery, verbalizes understanding. Dr. Horton ordered Norco 5/325 mg PO for pain, Dulcolax two tabs by mouth to take on 02/21/2019, Soap suds enema (SSE) on Thursday 02/21/2019, Golytely by mouth 2 liters on 2/21/2019, Erythromycin 500 mg one tab by mouth three times a day and Neomycin 1 gm by mouth three times a day.     2/13/2019: Presents to wound care clinic for a f/u with Dr. Horton for wound care treatment. No new orders in wound dressing change, reviewed preop orders with Mrs. Morrison per Dr. Horton, all questions answered. Surgery scheduled for 02/22/2019.  2/20/19: Continues wound care a previously ordered.  Depth measured per Dr. Horton 8cm.  Preop orders reviewed with patient who verbalized understanding.  Surgery scheduled for Fri 2/22/19.  Rx given to patient for Norco and Zofran.    03/06/19: Patient admitted to [facility] on 02/22/19 for exploratory laparotomy of abdomen per Dr. Horton. Patient discharged on 02/28/19 with " "home health and PICC line with IV antibiotics. Staples removed today in clinic. Patient denies any fever, chills, n&v; however, she states that she has "low energy." Continued with wound care as ordered.     3/13/2019: F/U with Dr. Horton for wound care treatment. Continue with same wound dressing change orders as per Dr. Horton, orders followed. Home Health to continue wound dressing change and lab draw for CBC weekly; continue IV Ertapenem/Invaz 1 gm IV daily as ordered.     3/20/2019: Clinic visit with Dr. Horton for abdominal abscess treatment. Presents with moderate draining from abdominal wound; wound size decreasing per measurement. Per Dr. Horton, apply one-piece system ostomy bag to abdominal wound for drain collection, may drain bag when it fills and may change every other day, continue applying gentamicin to wound bed before applying ostomy bag. Continue IV Invaz until completion. If ostomy bag not effective for draining collection, may return to previous orders for wound dressing change. Orders followed. Education provided to Mrs. Morrison on handwashing and ostomy care techniques, voices and demonstrates understanding.   3/27/19: Follow up with Dr. Horton today in clinic for abdominal abscess, moderated tanish yellow drainge present on dressing.  Wound slowly improving, patient refused one-piece ostomy bag stated " no it's too messy." requested to return to previous dressing prior to ostomy bag placement- iodoform gauze, aquacel extra, sm abd, and mefix tape.  Silver nitrate x1 per Dr. Horton today in clinic. Rx given to patient for PO Zofran.     04/03/2019: F/u clinic visit with Dr. Horton. Abdominal wound improving in size, picture on file. Wound cultures from abdominal abscess collected and sent to lab/micro, pending results. Mrs. Morrison states going out of the country ([place]) at the end of April and plans to stay there for approximately two months. Dr. Horton ordered IV antibiotic for " two more weeks and new wound care dressing, orders followed.     4/9/2019: Clinic visit with Dr. Horton.  Per Dr. Horton, wound deep measurement increased, draining present. Wound cultures from abdominal wound taken and sent to lab/micro, pending results. C/o abdominal pain and itching around wound, Rx for betamethasone cream 01.% and Norco 5/325mg give in clinic by Dr. Horton. Wound care dressing orders followed. Continue IV antibiotic as previously ordered.  04/17/19: F/u for non-healing wound to abdomen. Culture report negative. Dr. Horton ordered 1 more week of IV antibiotics. Continue with topical wound care as ordered.     4/24/2019: Clinic visit with Dr. Horton for abdominal wound treatment. Wound improvement present, pictures on file. Last IV antibiotic today 4/24/2019, [facility] to discontinue PICC line from left upper arm on 4/25/2019. Per Lyubov Prince, going to [place] on Friday 4/26/2019 and out of the country ([place]) on Monday 4/29/2019. This nurse spoke to Shaunna at [employer] ([employer] antibiotic delivery company) and informed last dose of antibiotic is today. Dr. Horton ordered Hydrocodone-acetaminophen (Norco) 5-325 mg (32 tabs), Bentyl 10 mg (120 capsules) and Gentamycin ointment. Education provided on the importance of eating food that contains iron (to prevent anemia) as well as eating meat and taking her blood pressure medications (blood pressure medication) on time, voices understanding. All questions answered by Dr. Horton. [facility] outpatient confirmed Betamethasone cream ordered on 4/10/2019 is ready for . Instructions and education provided to Mrs. Morrison on abdominal wound dressing changes, hand washing techniques and medication use, effects and side effects, voices and demonstrates understanding. Discharged home on stable conditions, [facility] will give wound care clinic a call when she is back from [place] in few months from now.     7/17/19: Readmit today to  wound care clinic.  Patient was recently out of the country visiting family in [place].  Patient complains of pain and nausea as on prior visits.  Dr. Horton seen patient today discussed with patient again placing colostomy patient refused.  Culture of wound taken, Dr. Horton restarted patient on zofran po for nausea, and Norco for pain. [facility] restarted today in clinic on Mondays and Fridays and prn. Orders given to gently pack wound with aquacel ag rope, cover with aquacel extra, 4x4 gauze, small abd pad and mefix tape change dressing ever other day by [facility] and Prn per patient.     7/24/2019: Clinic visit with Dr. Horton. Continue with dressing change as ordered. Wound cultures reviewed with patient, lab work ordered by Dr. Horton, no fever present or reported at this time. C/o decrease appetite, medication periactin prescribed. Requesting needles for insulin pen, order prescribed by Dr. Horton.      7/31/2019: F/U clinic visit with Dr. Horton. C/o of excruciated back pain 10/10, not feeling well, lot of gas and left upper quadrant discomfort. New orders prescribed: Augmentin 875-125 mg by mouth twice a day. Bentyl 10 mg one tab by mouth 4 times a day. Tramadol 50 mg one tab by mouth every 6 hours as needed for pain. Abdomen ultrasound. Wound care dressing completed as ordered.     8/7/2019: Clinic visit with Dr. Horton, denies any pain at this time. Wound care dressing done as ordered, no changes in wound size from last visit noted. Continue taking oral antiiotic as ordered, pending abdominal ultrasound, scheduled for 8/13/2019.     8/14/2019: F/U clinic visit with Dr. Horton. Admitted and discharged from emergency department this morning with abdominal pain, CT and ultrasound in filed. Dr. Horton performed a small drainage from the abdominal wound, moderate amount of creamy, serosanguineous fluid from abdominal wound present. Iodoform gauze packed into her wound, dressing changed  as ordered. Wound cultures collected and sent to lab/micro, pending results. Rx for Norco 5/325 mg one tab PO every 4 hours PRN as needed # 24 given to pt by Dr. Horton.      8/21/2019: Clinic visit with Dr. Horton. Wound dressing changed as ordered.      8/28/2019: Clinic visit with Dr. Horton. Continue with dressing change as ordered.      09/04/19: F/u wound care visit with Dr. Horton. Continue current wound care treatment. CBC and CMP ordered today     9/11/2019: Clinic visit with Dr. Horton. Continue with PO antibiotics, and wound dressing changed as ordered. New medication prescribed for arthritis, and pain, RX provided.     9/18/2019: Clinic visit with Dr. Horton. Wound dressing changed as ordered. Silver nitrate x 1 used by Dr. Horton, tolerated.  9/25/19: Patient seen today in clinic by Dr. Horton.  No changes to wound, wound care continued as ordered.  Silver Nitrate x 1 by Dr. Horton patient tolereated well. Rx sent to patient's pharmacy for Nausea medicine.   10/9/19: Patient seen today by Dr. Horton today in clinic. No changes wound. Silver Nitrate x 1 per Dr. Horton today in clinic, wound care as ordered.  Rx sent to pt's pharmacy for tramadol po  And Bentyl Po. F/u 1 week Dr. Horton  10/16/19: F/U with Dr. Horton. Silver nitrate x 2. Wound remains the same. Cont. POC.  10/23/19:   Patient seen today by Dr. Horton today in clinic. No changes wound care. Depth measurement per Dr. Horton.  Silver Nitrate x 1 per Dr. Horton today in clinic. Rx given to patient for refill of gentamicin.  Fu in 1 week.   10/30/19: Patient seen today by Dr. Horton no changes to wound or wound care.  Silver Nitrate x 1 per Dr. Horton.  Patient continues with Po Augmentin as ordered BID. Follow up in 1 week.       Review of Systems   Constitutional: Negative.    HENT: Negative.    Eyes: Negative.    Respiratory: Negative.    Cardiovascular: Negative.    Gastrointestinal: Negative.    Genitourinary: Negative.     Musculoskeletal: Negative.    Skin: Negative.    Neurological: Negative.    Psychiatric/Behavioral: Negative.        Objective:      Physical Exam   Constitutional: She is oriented to person, place, and time. She appears well-developed and well-nourished.   HENT:   Head: Normocephalic.   Eyes: Pupils are equal, round, and reactive to light. Conjunctivae and EOM are normal.   Neck: Normal range of motion. Neck supple.   Cardiovascular: Normal rate, regular rhythm, normal heart sounds and intact distal pulses.   Pulmonary/Chest: Effort normal and breath sounds normal.   Abdominal: Soft. Bowel sounds are normal.   Musculoskeletal: Normal range of motion.   Neurological: She is alert and oriented to person, place, and time. She has normal reflexes.   Skin: Skin is warm and dry.       Assessment:       No diagnosis found.       Incision/Site 02/22/19 1146 Abdomen midline midline (Active)   02/22/19 1146    Present Prior to Hospital Arrival?:    Side:    Location: Abdomen   Orientation: midline   Incision Type: midline   Closure Method:    Additional Comments:    Removal Indication and Assessment:    Wound Outcome: Palliative   Removal Indications:    Dressing Appearance Intact;Moist drainage 10/30/2019  9:01 AM   Drainage Amount Small 10/30/2019  9:01 AM   Drainage Characteristics/Odor Serosanguineous 10/30/2019  9:01 AM   Appearance Pink;Red;Moist;Wet 10/30/2019  9:01 AM   Red (%), Wound Tissue Color 100 % 10/30/2019  9:01 AM   Periwound Area Intact;Dry;Pink;Scar tissue 10/30/2019  9:01 AM   Wound Edges Defined;Open 10/30/2019  9:01 AM   Wound Length (cm) 0.3 cm 10/30/2019  9:01 AM   Wound Width (cm) 0.3 cm 10/30/2019  9:01 AM   Wound Depth (cm) 2.7 cm 10/30/2019  9:01 AM   Wound Volume (cm^3) 0.24 cm^3 10/30/2019  9:01 AM   Wound Surface Area (cm^2) 0.09 cm^2 10/30/2019  9:01 AM   Care Cleansed with:;Sterile normal saline 10/30/2019  9:01 AM   Dressing Applied;Gauze;Abd pad;Other (see comments) 10/30/2019  9:01 AM    Packing Incision packing removed;Incision packed with;gauze, iodoform 10/30/2019  9:01 AM   Packing Inserted  1 10/30/2019  9:01 AM   Packing Removed 1 10/30/2019  9:01 AM   Periwound Care Absorptive dressing applied;Skin barrier film applied 10/30/2019  9:01 AM   Dressing Change Due 11/01/19 10/30/2019  9:01 AM       Abdominal wall abscess  - Primary    Clean wound with normal saline  Lidocaine 2% gel or 4 % Topical solution: PRN  Silver Nitrate x 1 per Dr. Horton  Other: Cavilon to chauncey wound  Primary dressing: Apply gentamycin to wound bed. Pack wound with Iodoform gauze, cover with 4x4 gauze, small ABD pad, secure with mefix tape.  Frequency: Mondays and Fridays by [facility] and on Wednesdays at wound care clinic and prn need.  Other orders: Continue po antibiotic Augmentin.     Follow up in about 1 week (around 11/6/2019).    Plan:                1 week Dr. Horton

## 2019-10-31 ENCOUNTER — TELEPHONE (OUTPATIENT)
Dept: HOME HEALTH SERVICES | Facility: HOSPITAL | Age: 69
End: 2019-10-31

## 2019-11-06 ENCOUNTER — HOSPITAL ENCOUNTER (OUTPATIENT)
Dept: WOUND CARE | Facility: HOSPITAL | Age: 69
Discharge: HOME | End: 2019-11-06
Attending: SURGERY
Payer: MEDICARE

## 2019-11-06 VITALS
SYSTOLIC BLOOD PRESSURE: 165 MMHG | HEIGHT: 62 IN | BODY MASS INDEX: 30.91 KG/M2 | HEART RATE: 72 BPM | DIASTOLIC BLOOD PRESSURE: 78 MMHG | TEMPERATURE: 98 F | WEIGHT: 168 LBS

## 2019-11-06 DIAGNOSIS — L02.211 ABDOMINAL WALL ABSCESS: ICD-10-CM

## 2019-11-06 DIAGNOSIS — L02.211 ABSCESS OF ABDOMINAL WALL: Primary | ICD-10-CM

## 2019-11-06 DIAGNOSIS — Z79.4 TYPE 2 DIABETES MELLITUS WITH OTHER SKIN ULCER, WITH LONG-TERM CURRENT USE OF INSULIN: ICD-10-CM

## 2019-11-06 DIAGNOSIS — K63.2 COLOCUTANEOUS FISTULA: ICD-10-CM

## 2019-11-06 DIAGNOSIS — E11.622 TYPE 2 DIABETES MELLITUS WITH OTHER SKIN ULCER, WITH LONG-TERM CURRENT USE OF INSULIN: ICD-10-CM

## 2019-11-06 PROCEDURE — 99213 OFFICE O/P EST LOW 20 MIN: CPT

## 2019-11-06 RX ORDER — AMOXICILLIN AND CLAVULANATE POTASSIUM 875; 125 MG/1; MG/1
1 TABLET, FILM COATED ORAL 2 TIMES DAILY
Qty: 60 TABLET | Refills: 1 | Status: SHIPPED | OUTPATIENT
Start: 2019-11-06 | End: 2019-12-19 | Stop reason: SDUPTHER

## 2019-11-06 NOTE — PROGRESS NOTES
"Subjective:       Patient ID: Azucena Morrison is a 69 y.o. female.    Chief Complaint: Wound Care    6/29/17: Pt re-admit for distal abdominal wound. Pt denies any fevers or chills but states she feels nauseous at times. Returned to [place] June 28, from [place], reports much trouble with abdominal wound while in [place].  7/6/17-- Patient scheduled for surgical drainage of wound Tuesday 7/11/17DB  7/19/17-- Patient post op clinic visit.  8/16/17: CT of abdomen and pelvis done 8/9/17 due to suspected fistula  8/23/17-- U/S of abdomen done today.  12/13/17 Wound vac with 20cc drainage in clinic today.  1/10/18: on PO abx  1/24/18: Fistulagram ordered per Dr. Horton. Patient still on PO abx  02/21/18 Patient is not on antibiotics at this time. BS fasting 135 per patient report this am.  03/07/18 Culture of Anterior Abdominal Wound is positive. No antibiotics at this time.  fasting per patient report.    03/21/18 Patient c/o nausea along with abdominal tenderness near abd midline wound. Patient states that pain level is 6 and that she passed two sero-sanguineous clots from wound. Patient is afebrile this am.  3/28/18: patient continue antibiotics and reports that pain is less than last weeks clinic visit. Drainage has decreased as well  04/04/18 Patient states that abdominal pain is "pressure" sensation and that when she pushes down on her abdomen on either side of abdominal wound she feels like she has to urinate. Patient reports pain level of 3 this am.  fasting this am per patient report.   5/2/18: Patient had Abdomina Toribio today before wound care. She had discontinued Bactrim PO per Dr. Horton's recommendation and culture results and is now taking Amoxicillin PO  6/20/18: Pt reports itching to wound and periwound   6/27/18: patient reports no new complaints with regards to her wound or abdomen, wound continues to decrease in size and drainage  8/1/18: Pt reports increased pain to abdomen with nausea and " emesis since thursday 7/26/18, denies any fevers, patient did no go to ER as instructed by home health nurse on Sat. 7/28/18.  8/29/18: Patient admitted to hospital 8/2 for abdominal wound complications. Surgery for abdominal abscess per Dr. Horton on 8/3. Patient placed on IV antibiotics and discharged home on 8/18 with [facility]  and PICC line to LUE. Patient currently on IV ertapenem. IV medication sent to patients home per [facility].   9/5/18: IV Ertapenem to continue 1 week. Culture sent to lab. [facility]  sent orders. PICC line intact to E.  9/12/18 Antibiotics completed. Culture results pending. 1 deep stitch remaining.  9/12/18: Culture positive for E. Coli, patient to continue Ertapenem IV antibiotics x 2weeks. [facility] notified  9/26/18: F/U abdominal wound, wound continues to improve. Patient will complete IV antibiotics today  10/3/18: patient c/o diarrhea for 2 days and nausea with some vomiting. Labs and stool culture ordered. IV antibiotics discontinued today  10/10/2018 patient will start on IV antibiotic Invaz IV once a day, pending PICC line placement, order placed today  for PICC per Dr. Horton.  10/24/18: patient on IV antibiotics, tolerating well. Continue for 1 week  11/7/2018: IV Antibiotic discontinued.  11/8/2018: PICC line to left upper arm discontinued by Home Health.  11/21/2018 F/u for abdominal wall fistula , c/o nausea  No vomiting , no fever  12/5/2018: F/u for abdominal wall fisula, c/o gas, Dr. Horton will order Bentyl. Surgery schedule for January 2019.  12/12/2018: F/u for abdominal wall fistula, c/o abdominal pain. Dr. Horton ordered Norco 5/325mg tab 1 PO every 4 hours as needed for pain and referred patient for x-ray of abdomen after clinic today.  12/19/2018: F/u for abdominal wall fisula, c/o nausea, Dr. Horton re ordered Ondansetron (Zofran) 8mg one tab by mouth every eight hours as needed for nausea. No changes in wound condition from last visit  noted in clinic today.  12/26/2018: F/u abdominal wall fistula drainage, no c/o at this time. Denies any abdominal pain or nausea. Dr. Horton recommends surgery first week of January 2019 for abdominal fistula treatment and possible colostomy placement, patient agree.  01/02/2019: F/u abdominal wall fistula. Dressing with large amount of drainage, malodorous, Dr. Horton is scheduling surgery for third week of January, 2019.  1/16/19: F/U Dr. Horton today in clinic, surgery scheduled for next Friday 1/25/19 with Dr. Horton.     01/23/2019: Presents to wound care clinic for f/u abdominal wound care tx. Surgery scheduled with Dr. Horton for Friday 01/25/2019. Dr. Horton explained Mrs. Morrison surgery procedure including possible complications, Nurse  (Mauritanian) present, patient verbalizes understanding of procedure including possible complications, all questions from patient were answered by Dr. Horton including blood sugar and blood pressure medications per patient's concerns. Consent for surgery and blood transfusion signed by patient, Dr. Horton and nurse witnessed.  Abdominal wound cultures collected per Dr. Horton, cultures sent to lab/micro pending results. Dr. Horton prescribed the following orders: fleet enema for Thursday 1/24/2019, clear liquid diet and not to eat after midnight all for 1/24/2019. Start taking Neomycin and erythromycin by mouth three times a day (1/24/2019) and dulcolax one tab by mouth twice a day, Mrs. Morrison voices understanding.     01/30/2019: F/U wound care treatment with Dr. Horton. Per pt, primary physician Dr. Pj Monae ordered for her to take potassium pills for three days, last day today and scheduled for lab work at primary physician clinic on 1/31/2019. Per pt. Feeling better, no more abdominal pain (went to ER 1/24/2019 and discharged 1/25/2019 c/o abd pain.). Dr. Horton awaiting on primary clearance to re-schedule surgery, per pt. She will call wound  "care clinic and Dr. Horton to make aware of clearance day. Continue with same orders for dressing change for Dr. Horton. Mrs. Morrison requesting gentamicin refill.     02/06/2019: F/u with Dr. Horton for wound care treatment. Mrs. Morrison brought to clinic a clearance for surgery by Dr. Pj Sanches dated 02/06/2019 "Hyperkalemia-Resolved K 4.8 2/1/2019, labs , Cr. 1.05, H/H 10/30. No contraindication to surgery, tight control of BS, Hydration." A copy of EKG (1/24/2019) and lab work from [facility] collected 1/31/2019, reported results 2/1/2019. Dr. Horton scheduled surgery for 02/22/2019, consent were signed on 01/23/2019, all questions were answered by Dr. Horton, this nurse  (Romanian) present. Education provided to patient on the importance of having a clear liquid diet the day before surgery 02/21/2019 as per Dr. Horton, new medications and preop preparation before surgery, verbalizes understanding. Dr. Horton ordered Norco 5/325 mg PO for pain, Dulcolax two tabs by mouth to take on 02/21/2019, Soap suds enema (SSE) on Thursday 02/21/2019, Golytely by mouth 2 liters on 2/21/2019, Erythromycin 500 mg one tab by mouth three times a day and Neomycin 1 gm by mouth three times a day.     2/13/2019: Presents to wound care clinic for a f/u with Dr. Horton for wound care treatment. No new orders in wound dressing change, reviewed preop orders with Mrs. Morrison per Dr. Horton, all questions answered. Surgery scheduled for 02/22/2019.  2/20/19: Continues wound care a previously ordered.  Depth measured per Dr. Horton 8cm.  Preop orders reviewed with patient who verbalized understanding.  Surgery scheduled for Fri 2/22/19.  Rx given to patient for Norco and Zofran.    03/06/19: Patient admitted to [facility] on 02/22/19 for exploratory laparotomy of abdomen per Dr. Horton. Patient discharged on 02/28/19 with home health and PICC line with IV antibiotics. Staples removed today in " "clinic. Patient denies any fever, chills, n&v; however, she states that she has "low energy." Continued with wound care as ordered.     3/13/2019: F/U with Dr. Horton for wound care treatment. Continue with same wound dressing change orders as per Dr. Horton, orders followed. Home Health to continue wound dressing change and lab draw for CBC weekly; continue IV Ertapenem/Invaz 1 gm IV daily as ordered.     3/20/2019: Clinic visit with Dr. Horton for abdominal abscess treatment. Presents with moderate draining from abdominal wound; wound size decreasing per measurement. Per Dr. Horton, apply one-piece system ostomy bag to abdominal wound for drain collection, may drain bag when it fills and may change every other day, continue applying gentamicin to wound bed before applying ostomy bag. Continue IV Invaz until completion. If ostomy bag not effective for draining collection, may return to previous orders for wound dressing change. Orders followed. Education provided to Mrs. Morrison on handwashing and ostomy care techniques, voices and demonstrates understanding.   3/27/19: Follow up with Dr. Horton today in clinic for abdominal abscess, moderated tanish yellow drainge present on dressing.  Wound slowly improving, patient refused one-piece ostomy bag stated " no it's too messy." requested to return to previous dressing prior to ostomy bag placement- iodoform gauze, aquacel extra, sm abd, and mefix tape.  Silver nitrate x1 per Dr. Horton today in clinic. Rx given to patient for PO Zofran.     04/03/2019: F/u clinic visit with Dr. Horton. Abdominal wound improving in size, picture on file. Wound cultures from abdominal abscess collected and sent to lab/micro, pending results. Mrs. Morrison states going out of the country ([place]) at the end of April and plans to stay there for approximately two months. Dr. Horton ordered IV antibiotic for two more weeks and new wound care dressing, orders " followed.     4/9/2019: Clinic visit with Dr. Horton.  Per Dr. Horton, wound deep measurement increased, draining present. Wound cultures from abdominal wound taken and sent to lab/micro, pending results. C/o abdominal pain and itching around wound, Rx for betamethasone cream 01.% and Norco 5/325mg give in clinic by Dr. Horton. Wound care dressing orders followed. Continue IV antibiotic as previously ordered.  04/17/19: F/u for non-healing wound to abdomen. Culture report negative. Dr. Horton ordered 1 more week of IV antibiotics. Continue with topical wound care as ordered.     4/24/2019: Clinic visit with Dr. Horton for abdominal wound treatment. Wound improvement present, pictures on file. Last IV antibiotic today 4/24/2019, [facility] to discontinue PICC line from left upper arm on 4/25/2019. Per Lyubov Prince, going to [place] on Friday 4/26/2019 and out of the country ([place]) on Monday 4/29/2019. This nurse spoke to Shaunna at [email or web address] (IV antibiotic delivery company) and informed last dose of antibiotic is today. Dr. Horton ordered Hydrocodone-acetaminophen (Norco) 5-325 mg (32 tabs), Bentyl 10 mg (120 capsules) and Gentamycin ointment. Education provided on the importance of eating food that contains iron (to prevent anemia) as well as eating meat and taking her blood pressure medications (blood pressure medication) on time, voices understanding. All questions answered by Dr. Horton. [facility] outpatient confirmed Betamethasone cream ordered on 4/10/2019 is ready for . Instructions and education provided to Mrs. Morrison on abdominal wound dressing changes, hand washing techniques and medication use, effects and side effects, voices and demonstrates understanding. Discharged home on stable conditions, [facility] will give wound care clinic a call when she is back from [place] in few months from now.     7/17/19: Readmit today to wound care clinic.  Patient was recently out of the  country visiting family in [place].  Patient complains of pain and nausea as on prior visits.  Dr. Horton seen patient today discussed with patient again placing colostomy patient refused.  Culture of wound taken, Dr. Horton restarted patient on zofran po for nausea, and Norco for pain. [facility] restarted today in clinic on Mondays and Fridays and prn. Orders given to gently pack wound with aquacel ag rope, cover with aquacel extra, 4x4 gauze, small abd pad and mefix tape change dressing ever other day by [facility] and Prn per patient.     7/24/2019: Clinic visit with Dr. Horton. Continue with dressing change as ordered. Wound cultures reviewed with patient, lab work ordered by Dr. Horton, no fever present or reported at this time. C/o decrease appetite, medication periactin prescribed. Requesting needles for insulin pen, order prescribed by Dr. Horton.      7/31/2019: F/U clinic visit with Dr. Horton. C/o of excruciated back pain 10/10, not feeling well, lot of gas and left upper quadrant discomfort. New orders prescribed: Augmentin 875-125 mg by mouth twice a day. Bentyl 10 mg one tab by mouth 4 times a day. Tramadol 50 mg one tab by mouth every 6 hours as needed for pain. Abdomen ultrasound. Wound care dressing completed as ordered.     8/7/2019: Clinic visit with Dr. Horton, denies any pain at this time. Wound care dressing done as ordered, no changes in wound size from last visit noted. Continue taking oral antiiotic as ordered, pending abdominal ultrasound, scheduled for 8/13/2019.     8/14/2019: F/U clinic visit with Dr. Horton. Admitted and discharged from emergency department this morning with abdominal pain, CT and ultrasound in filed. Dr. Horton performed a small drainage from the abdominal wound, moderate amount of creamy, serosanguineous fluid from abdominal wound present. Iodoform gauze packed into her wound, dressing changed as ordered. Wound cultures collected and sent to  lab/micro, pending results. Rx for Norco 5/325 mg one tab PO every 4 hours PRN as needed # 24 given to pt by Dr. Horton.      8/21/2019: Clinic visit with Dr. Horton. Wound dressing changed as ordered.     11/6/19: Follow up appt with Dr Horton. Wounds improving slowly. Continues to take antibiotics (Augmentin 875-125mg). No complaints voiced. Follow up in 1 week.    Review of Systems   Constitutional: Negative.    HENT: Negative.    Eyes: Negative.    Respiratory: Negative.    Cardiovascular: Negative.    Gastrointestinal: Negative.    Genitourinary: Negative.    Musculoskeletal: Negative.    Skin: Negative.    Neurological: Negative.    Psychiatric/Behavioral: Negative.        Objective:      Physical Exam   Constitutional: She is oriented to person, place, and time. She appears well-developed and well-nourished.   HENT:   Head: Normocephalic.   Eyes: Pupils are equal, round, and reactive to light. Conjunctivae and EOM are normal.   Neck: Normal range of motion. Neck supple.   Cardiovascular: Normal rate, regular rhythm, normal heart sounds and intact distal pulses.   Pulmonary/Chest: Effort normal and breath sounds normal.   Abdominal: Soft. Bowel sounds are normal.   Musculoskeletal: Normal range of motion.   Neurological: She is alert and oriented to person, place, and time. She has normal reflexes.   Skin: Skin is warm and dry.       Assessment:       1. Abscess of abdominal wall    2. Abdominal wall abscess           Incision/Site 02/22/19 1146 Abdomen midline midline (Active)   02/22/19 1146    Present Prior to Hospital Arrival?:    Side:    Location: Abdomen   Orientation: midline   Incision Type: midline   Closure Method:    Additional Comments:    Removal Indication and Assessment:    Wound Outcome: Palliative   Removal Indications:    Dressing Appearance Open to air;Moist drainage 11/6/2019  9:00 AM   Drainage Amount Small 11/6/2019  9:00 AM   Drainage Characteristics/Odor Sanguineous 11/6/2019  9:00  AM   Appearance Red;Moist 11/6/2019  9:00 AM   Red (%), Wound Tissue Color 100 % 11/6/2019  9:00 AM   Periwound Area Intact 11/6/2019  9:00 AM   Wound Edges Defined 11/6/2019  9:00 AM   Wound Length (cm) 0.2 cm 11/6/2019  9:00 AM   Wound Width (cm) 0.3 cm 11/6/2019  9:00 AM   Wound Depth (cm) 0.4 cm 11/6/2019  9:00 AM   Wound Volume (cm^3) 0.02 cm^3 11/6/2019  9:00 AM   Wound Surface Area (cm^2) 0.06 cm^2 11/6/2019  9:00 AM   Care Cleansed with:;Sterile normal saline 11/6/2019  9:00 AM   Dressing Applied;Gauze;Island/border;Other (see comments) 11/6/2019  9:00 AM   Packing gauze, iodoform 11/6/2019  9:00 AM   Periwound Care Absorptive dressing applied 11/6/2019  9:00 AM   Dressing Change Due 11/08/19 11/6/2019  9:00 AM       Abdominal wall abscess  - Primary    Clean wound with normal saline  Lidocaine 2% gel or 4 % Topical solution: PRN  Other: Cavilon to chauncey wound  Primary dressing: Apply gentamycin to wound bed. Pack wound with Iodoform gauze, cover with 4x4 gauze, small ABD pad, secure with mefix tape.  Frequency: Mondays and Fridays by [facility] and on Wednesdays at wound care clinic and prn need.  Other orders: Continue po antibiotic Augmentin.     Follow up in about 1 week (around 11/13/2019).    Plan:                Follow up in about 1 week (around 11/13/2019).

## 2019-11-07 ENCOUNTER — TELEPHONE (OUTPATIENT)
Dept: HOME HEALTH SERVICES | Facility: HOSPITAL | Age: 69
End: 2019-11-07

## 2019-11-13 ENCOUNTER — HOSPITAL ENCOUNTER (OUTPATIENT)
Dept: WOUND CARE | Facility: HOSPITAL | Age: 69
Discharge: HOME | End: 2019-11-13
Attending: SURGERY
Payer: MEDICARE

## 2019-11-13 VITALS
SYSTOLIC BLOOD PRESSURE: 138 MMHG | BODY MASS INDEX: 30.91 KG/M2 | TEMPERATURE: 98 F | DIASTOLIC BLOOD PRESSURE: 78 MMHG | HEIGHT: 62 IN | HEART RATE: 81 BPM | WEIGHT: 168 LBS

## 2019-11-13 DIAGNOSIS — L02.211 ABDOMINAL WALL ABSCESS: ICD-10-CM

## 2019-11-13 DIAGNOSIS — Z90.49 HISTORY OF HEMICOLECTOMY: ICD-10-CM

## 2019-11-13 DIAGNOSIS — K63.2 COLONIC FISTULA: ICD-10-CM

## 2019-11-13 DIAGNOSIS — E11.628 DIABETES WITH SKIN COMPLICATION: ICD-10-CM

## 2019-11-13 DIAGNOSIS — E11.620 TYPE 2 DIABETES MELLITUS WITH DIABETIC DERMATITIS, WITHOUT LONG-TERM CURRENT USE OF INSULIN: ICD-10-CM

## 2019-11-13 DIAGNOSIS — K63.2 COLOCUTANEOUS FISTULA: ICD-10-CM

## 2019-11-13 DIAGNOSIS — L02.211 ABSCESS OF ABDOMINAL WALL: Primary | ICD-10-CM

## 2019-11-13 DIAGNOSIS — Z79.4 TYPE 2 DIABETES MELLITUS WITH OTHER SKIN ULCER, WITH LONG-TERM CURRENT USE OF INSULIN: ICD-10-CM

## 2019-11-13 DIAGNOSIS — T14.8XXA DRAINAGE FROM WOUND: ICD-10-CM

## 2019-11-13 DIAGNOSIS — I10 ESSENTIAL HYPERTENSION: ICD-10-CM

## 2019-11-13 DIAGNOSIS — K56.609 SMALL BOWEL OBSTRUCTION: ICD-10-CM

## 2019-11-13 DIAGNOSIS — E11.622 TYPE 2 DIABETES MELLITUS WITH OTHER SKIN ULCER, WITH LONG-TERM CURRENT USE OF INSULIN: ICD-10-CM

## 2019-11-13 DIAGNOSIS — E11.9 TYPE 2 DIABETES MELLITUS WITHOUT COMPLICATION, WITHOUT LONG-TERM CURRENT USE OF INSULIN: ICD-10-CM

## 2019-11-13 PROCEDURE — 99214 OFFICE O/P EST MOD 30 MIN: CPT

## 2019-11-13 NOTE — PROGRESS NOTES
"[facility]                Progress Note    Subjective:       Patient ID: Azucena Morrison is a 69 y.o. female.    Chief Complaint: Non-healing Wound Follow Up    Chief Complaint: Wound Care     6/29/17: Pt re-admit for distal abdominal wound. Pt denies any fevers or chills but states she feels nauseous at times. Returned to [place] June 28, from [place], reports much trouble with abdominal wound while in [place].  7/6/17-- Patient scheduled for surgical drainage of wound Tuesday 7/11/17DB 7/19/17-- Patient post op clinic visit.  8/16/17: CT of abdomen and pelvis done 8/9/17 due to suspected fistula  8/23/17-- U/S of abdomen done today.  12/13/17 Wound vac with 20cc drainage in clinic today.  1/10/18: on PO abx  1/24/18: Fistulagram ordered per Dr. Horton. Patient still on PO abx  02/21/18 Patient is not on antibiotics at this time. BS fasting 135 per patient report this am.  03/07/18 Culture of Anterior Abdominal Wound is positive. No antibiotics at this time.  fasting per patient report.    03/21/18 Patient c/o nausea along with abdominal tenderness near abd midline wound. Patient states that pain level is 6 and that she passed two sero-sanguineous clots from wound. Patient is afebrile this am.  3/28/18: patient continue antibiotics and reports that pain is less than last weeks clinic visit. Drainage has decreased as well  04/04/18 Patient states that abdominal pain is "pressure" sensation and that when she pushes down on her abdomen on either side of abdominal wound she feels like she has to urinate. Patient reports pain level of 3 this am.  fasting this am per patient report.   5/2/18: Patient had Abdomina Toribio today before wound care. She had discontinued Bactrim PO per Dr. Horton's recommendation and culture results and is now taking Amoxicillin PO  6/20/18: Pt reports itching to wound and periwound   6/27/18: patient reports no new complaints " with regards to her wound or abdomen, wound continues to decrease in size and drainage  8/1/18: Pt reports increased pain to abdomen with nausea and emesis since thursday 7/26/18, denies any fevers, patient did no go to ER as instructed by home health nurse on Sat. 7/28/18.  8/29/18: Patient admitted to hospital 8/2 for abdominal wound complications. Surgery for abdominal abscess per Dr. Horton on 8/3. Patient placed on IV antibiotics and discharged home on 8/18 with [facility] and PICC line to [facility]. Patient currently on IV ertapenem. IV medication sent to patients home per [facility].   9/5/18: IV Ertapenem to continue 1 week. Culture sent to lab. [facility] sent orders. PICC line intact to [facility].  9/12/18 Antibiotics completed. Culture results pending. 1 deep stitch remaining.  9/12/18: Culture positive for E. Coli, patient to continue Ertapenem IV antibiotics x 2weeks. [facility] notified  9/26/18: F/U abdominal wound, wound continues to improve. Patient will complete IV antibiotics today  10/3/18: patient c/o diarrhea for 2 days and nausea with some vomiting. Labs and stool culture ordered. IV antibiotics discontinued today  10/10/2018 patient will start on IV antibiotic Invaz IV once a day, pending PICC line placement, order placed today  for PICC per Dr. Horton.  10/24/18: patient on IV antibiotics, tolerating well. Continue for 1 week  11/7/2018: IV Antibiotic discontinued.  11/8/2018: PICC line to left upper arm discontinued by Home Health.  11/21/2018 F/u for abdominal wall fistula , c/o nausea  No vomiting , no fever  12/5/2018: F/u for abdominal wall fisula, c/o gas, Dr. Horton will order Bentyl. Surgery schedule for January 2019.  12/12/2018: F/u for abdominal wall fistula, c/o abdominal pain. Dr. Horton ordered Norco 5/325mg tab 1 PO every 4 hours as needed for pain and referred patient for x-ray of abdomen after clinic today.  12/19/2018: F/u for abdominal wall fisula, c/o nausea,   Clifford re ordered Ondansetron (Zofran) 8mg one tab by mouth every eight hours as needed for nausea. No changes in wound condition from last visit noted in clinic today.  12/26/2018: F/u abdominal wall fistula drainage, no c/o at this time. Denies any abdominal pain or nausea. Dr. Horton recommends surgery first week of January 2019 for abdominal fistula treatment and possible colostomy placement, patient agree.  01/02/2019: F/u abdominal wall fistula. Dressing with large amount of drainage, malodorous, Dr. Horton is scheduling surgery for third week of January, 2019.  1/16/19: F/U Dr. Horton today in clinic, surgery scheduled for next Friday 1/25/19 with Dr. Horton.     01/23/2019: Presents to wound care clinic for f/u abdominal wound care tx. Surgery scheduled with Dr. Horton for Friday 01/25/2019. Dr. Horton explained Mrs. Morrison surgery procedure including possible complications, Nurse  (Divehi) present, patient verbalizes understanding of procedure including possible complications, all questions from patient were answered by Dr. Horton including blood sugar and blood pressure medications per patient's concerns. Consent for surgery and blood transfusion signed by patient, Dr. Horton and nurse witnessed.  Abdominal wound cultures collected per Dr. Horton, cultures sent to lab/micro pending results. Dr. Horton prescribed the following orders: fleet enema for Thursday 1/24/2019, clear liquid diet and not to eat after midnight all for 1/24/2019. Start taking Neomycin and erythromycin by mouth three times a day (1/24/2019) and dulcolax one tab by mouth twice a day, Mrs. Morrison voices understanding.     01/30/2019: F/U wound care treatment with Dr. Horton. Per pt, primary physician Dr. Pj Monae ordered for her to take potassium pills for three days, last day today and scheduled for lab work at primary physician clinic on 1/31/2019. Per pt. Feeling better, no more abdominal pain (went to ER  "1/24/2019 and discharged 1/25/2019 c/o abd pain.). Dr. Horton awaiting on primary clearance to re-schedule surgery, per pt. She will call wound care clinic and Dr. Horton to make aware of clearance day. Continue with same orders for dressing change for Dr. Horton. Mrs. Morrison requesting gentamicin refill.     02/06/2019: F/u with Dr. Horton for wound care treatment. Mrs. Morrison brought to clinic a clearance for surgery by Dr. Pj Sanches dated 02/06/2019 "Hyperkalemia-Resolved K 4.8 2/1/2019, labs , Cr. 1.05, H/H 10/30. No contraindication to surgery, tight control of BS, Hydration." A copy of EKG (1/24/2019) and lab work from [facility] collected 1/31/2019, reported results 2/1/2019. Dr. Horton scheduled surgery for 02/22/2019, consent were signed on 01/23/2019, all questions were answered by Dr. Horton, this nurse  (Tajik) present. Education provided to patient on the importance of having a clear liquid diet the day before surgery 02/21/2019 as per Dr. Horton, new medications and preop preparation before surgery, verbalizes understanding. Dr. Horton ordered Norco 5/325 mg PO for pain, Dulcolax two tabs by mouth to take on 02/21/2019, Soap suds enema (SSE) on Thursday 02/21/2019, Golytely by mouth 2 liters on 2/21/2019, Erythromycin 500 mg one tab by mouth three times a day and Neomycin 1 gm by mouth three times a day.     2/13/2019: Presents to wound care clinic for a f/u with Dr. Horton for wound care treatment. No new orders in wound dressing change, reviewed preop orders with Mrs. Morrison per Dr. Horton, all questions answered. Surgery scheduled for 02/22/2019.  2/20/19: Continues wound care a previously ordered.  Depth measured per Dr. Horton 8cm.  Preop orders reviewed with patient who verbalized understanding.  Surgery scheduled for Fri 2/22/19.  Rx given to patient for Norco and Zofran.    03/06/19: Patient admitted to [facility] on 02/22/19 for exploratory " "laparotomy of abdomen per Dr. Horton. Patient discharged on 02/28/19 with home health and PICC line with IV antibiotics. Staples removed today in clinic. Patient denies any fever, chills, n&v; however, she states that she has "low energy." Continued with wound care as ordered.     3/13/2019: F/U with Dr. Horton for wound care treatment. Continue with same wound dressing change orders as per Dr. Horton, orders followed. Home Health to continue wound dressing change and lab draw for CBC weekly; continue IV Ertapenem/Invaz 1 gm IV daily as ordered.     3/20/2019: Clinic visit with Dr. Horton for abdominal abscess treatment. Presents with moderate draining from abdominal wound; wound size decreasing per measurement. Per Dr. Horton, apply one-piece system ostomy bag to abdominal wound for drain collection, may drain bag when it fills and may change every other day, continue applying gentamicin to wound bed before applying ostomy bag. Continue IV Invaz until completion. If ostomy bag not effective for draining collection, may return to previous orders for wound dressing change. Orders followed. Education provided to Mrs. Morrison on handwashing and ostomy care techniques, voices and demonstrates understanding.   3/27/19: Follow up with Dr. Horton today in clinic for abdominal abscess, moderated tanish yellow drainge present on dressing.  Wound slowly improving, patient refused one-piece ostomy bag stated " no it's too messy." requested to return to previous dressing prior to ostomy bag placement- iodoform gauze, aquacel extra, sm abd, and mefix tape.  Silver nitrate x1 per Dr. Horton today in clinic. Rx given to patient for PO Zofran.     04/03/2019: F/u clinic visit with Dr. Horton. Abdominal wound improving in size, picture on file. Wound cultures from abdominal abscess collected and sent to lab/micro, pending results. Mrs. Morrison states going out of the country ([place]) at the end of April and plans to stay " there for approximately two months. Dr. Horton ordered IV antibiotic for two more weeks and new wound care dressing, orders followed.     4/9/2019: Clinic visit with Dr. Horton.  Per Dr. Horton, wound deep measurement increased, draining present. Wound cultures from abdominal wound taken and sent to lab/micro, pending results. C/o abdominal pain and itching around wound, Rx for betamethasone cream 01.% and Norco 5/325mg give in clinic by Dr. Horton. Wound care dressing orders followed. Continue IV antibiotic as previously ordered.  04/17/19: F/u for non-healing wound to abdomen. Culture report negative. Dr. Horton ordered 1 more week of IV antibiotics. Continue with topical wound care as ordered.     4/24/2019: Clinic visit with Dr. Horton for abdominal wound treatment. Wound improvement present, pictures on file. Last IV antibiotic today 4/24/2019, [facility] to discontinue PICC line from left upper arm on 4/25/2019. Per Lyubov Prince, going to [place] on Friday 4/26/2019 and out of the country ([place]) on Monday 4/29/2019. This nurse spoke to Shaunna at [employer] (IV antibiotic delivery company) and informed last dose of antibiotic is today. Dr. Horton ordered Hydrocodone-acetaminophen (Norco) 5-325 mg (32 tabs), Bentyl 10 mg (120 capsules) and Gentamycin ointment. Education provided on the importance of eating food that contains iron (to prevent anemia) as well as eating meat and taking her blood pressure medications (blood pressure medication) on time, voices understanding. All questions answered by Dr. Horton. [facility] outpatient confirmed Betamethasone cream ordered on 4/10/2019 is ready for . Instructions and education provided to Mrs. Morrison on abdominal wound dressing changes, hand washing techniques and medication use, effects and side effects, voices and demonstrates understanding. Discharged home on stable conditions, states will give wound care clinic a call when she is  back from [place] in few months from now.     7/17/19: Readmit today to wound care clinic.  Patient was recently out of the country visiting family in [place].  Patient complains of pain and nausea as on prior visits.  Dr. Horton seen patient today discussed with patient again placing colostomy patient refused.  Culture of wound taken, Dr. Horton restarted patient on zofran po for nausea, and Norco for pain. [facility] restarted today in clinic on Mondays and Fridays and prn. Orders given to gently pack wound with aquacel ag rope, cover with aquacel extra, 4x4 gauze, small abd pad and mefix tape change dressing ever other day by [facility] and Prn per patient.     7/24/2019: Clinic visit with Dr. Horton. Continue with dressing change as ordered. Wound cultures reviewed with patient, lab work ordered by Dr. Horton, no fever present or reported at this time. C/o decrease appetite, medication periactin prescribed. Requesting needles for insulin pen, order prescribed by Dr. Horton.      7/31/2019: F/U clinic visit with Dr. Horton. C/o of excruciated back pain 10/10, not feeling well, lot of gas and left upper quadrant discomfort. New orders prescribed: Augmentin 875-125 mg by mouth twice a day. Bentyl 10 mg one tab by mouth 4 times a day. Tramadol 50 mg one tab by mouth every 6 hours as needed for pain. Abdomen ultrasound. Wound care dressing completed as ordered.     8/7/2019: Clinic visit with Dr. Horton, denies any pain at this time. Wound care dressing done as ordered, no changes in wound size from last visit noted. Continue taking oral antiiotic as ordered, pending abdominal ultrasound, scheduled for 8/13/2019.     8/14/2019: F/U clinic visit with Dr. Horton. Admitted and discharged from emergency department this morning with abdominal pain, CT and ultrasound in filed. Dr. Horton performed a small drainage from the abdominal wound, moderate amount of creamy, serosanguineous fluid from  abdominal wound present. Iodoform gauze packed into her wound, dressing changed as ordered. Wound cultures collected and sent to lab/micro, pending results. Rx for Norco 5/325 mg one tab PO every 4 hours PRN as needed # 24 given to pt by Dr. Horton.      8/21/2019: Clinic visit with Dr. Horton. Wound dressing changed as ordered.      11/6/19: Follow up appt with Dr Horton. Wounds improving slowly. Continues to take antibiotics (Augmentin 875-125mg). No complaints voiced. Follow up in 1 week.  11/13/19:  F/U clinic visit with Dr. Horton.  Wound depth per Dr. Horton is 8.5 cm.  Continue PT Augmentin.  Patient is co left sided abdominal pain.  Abdominal US and labs ordered.  Wound care tolerated well.  Fu with Dr. Horton in 1 week.        Review of Systems   Constitutional: Negative.    HENT: Negative.    Eyes: Negative.    Respiratory: Negative.    Cardiovascular: Negative.    Gastrointestinal: Negative.    Genitourinary: Negative.    Musculoskeletal: Negative.    Skin: Negative.    Neurological: Negative.    Psychiatric/Behavioral: Negative.          Objective:        Physical Exam   Constitutional: She is oriented to person, place, and time. She appears well-developed and well-nourished.   HENT:   Head: Normocephalic.   Eyes: Pupils are equal, round, and reactive to light. Conjunctivae and EOM are normal.   Neck: Normal range of motion. Neck supple.   Cardiovascular: Normal rate, regular rhythm, normal heart sounds and intact distal pulses.   Pulmonary/Chest: Effort normal and breath sounds normal.   Abdominal: Soft. Bowel sounds are normal.   Musculoskeletal: Normal range of motion.   Neurological: She is alert and oriented to person, place, and time. She has normal reflexes.   Skin: Skin is warm and dry.       Vitals:    11/13/19 0854   BP: 138/78   Pulse: 81   Temp: 98.4 °F (36.9 °C)       Assessment:           ICD-10-CM ICD-9-CM   1. Abscess of abdominal wall L02.211 682.2   2. Abdominal wall abscess  L02.211 682.2   3. Colocutaneous fistula K63.2 569.81   4. 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 Type 2 diabetes mellitus with diabetic dermatitis, without long-term current use of insulin E11.620 250.80   6. Drainage from wound T14.8XXA 879.8   7. Small bowel obstruction K56.609 560.9            Incision/Site 02/22/19 1146 Abdomen midline midline (Active)   02/22/19 1146    Present Prior to Hospital Arrival?:    Side:    Location: Abdomen   Orientation: midline   Incision Type: midline   Closure Method:    Additional Comments:    Removal Indication and Assessment:    Wound Outcome: Palliative   Removal Indications:    Wound Image   11/13/2019  8:00 AM   Dressing Appearance Intact;Moist drainage 11/13/2019  8:00 AM   Drainage Amount Moderate 11/13/2019  8:00 AM   Drainage Characteristics/Odor Serosanguineous;Tan 11/13/2019  8:00 AM   Appearance Red;Moist 11/13/2019  8:00 AM   Red (%), Wound Tissue Color 100 % 11/13/2019  8:00 AM   Periwound Area Intact;Scar tissue 11/13/2019  8:00 AM   Wound Edges Defined;Open 11/13/2019  8:00 AM   Wound Length (cm) 0.4 cm 11/13/2019  8:00 AM   Wound Width (cm) 0.5 cm 11/13/2019  8:00 AM   Wound Depth (cm) 8.5 cm 11/13/2019  8:00 AM   Wound Volume (cm^3) 1.7 cm^3 11/13/2019  8:00 AM   Wound Surface Area (cm^2) 0.2 cm^2 11/13/2019  8:00 AM   Care Cleansed with:;Sterile normal saline 11/13/2019  8:00 AM   Dressing Changed;Applied;Gauze;Abd pad 11/13/2019  8:00 AM   Packing gauze, iodoform 11/13/2019  8:00 AM   Packing Inserted  1 11/13/2019  8:00 AM   Periwound Care Absorptive dressing applied;Skin barrier film applied 11/13/2019  8:00 AM   Dressing Change Due 11/15/19 11/13/2019  8:00 AM       Abdominal wall abscess      Clean wound with normal saline  Lidocaine 2% gel or 4 % Topical solution: PRN  Chauncey wound:  Maintain dry chauncey wound, Cavilon under tape, betamethasone PRN itching  Primary  dressing: Apply gentamycin to wound bed, pack wound with Iodoform gauze   Secondary dressing: cover with 4x4 gauze, small ABD pad, secure with mefix tape.  Frequency: Mon, Wed, Fri      Home Health:  [facility]:  Home Health nurse to assess wound and perform wound care on Mon, Fri, and PRN  Other orders: Continue po antibiotic Augmentin.    US of abdomen and labs ordered 11/13/19        Plan:            Follow up with Dr. Horton in 1 week on Wed 11/20/19

## 2019-11-14 ENCOUNTER — TELEPHONE (OUTPATIENT)
Dept: HOME HEALTH SERVICES | Facility: HOSPITAL | Age: 69
End: 2019-11-14

## 2019-11-19 ENCOUNTER — EXTERNAL HOME HEALTH (OUTPATIENT)
Dept: HOME HEALTH SERVICES | Facility: HOSPITAL | Age: 69
End: 2019-11-19

## 2019-11-20 ENCOUNTER — HOSPITAL ENCOUNTER (OUTPATIENT)
Dept: WOUND CARE | Facility: HOSPITAL | Age: 69
Discharge: HOME | End: 2019-11-20
Attending: SURGERY
Payer: MEDICARE

## 2019-11-20 VITALS
WEIGHT: 168 LBS | SYSTOLIC BLOOD PRESSURE: 169 MMHG | TEMPERATURE: 98 F | DIASTOLIC BLOOD PRESSURE: 81 MMHG | HEIGHT: 62 IN | BODY MASS INDEX: 30.91 KG/M2 | HEART RATE: 81 BPM

## 2019-11-20 DIAGNOSIS — E11.628 DIABETES WITH SKIN COMPLICATION: ICD-10-CM

## 2019-11-20 DIAGNOSIS — L02.211 ABSCESS OF ABDOMINAL WALL: Primary | ICD-10-CM

## 2019-11-20 DIAGNOSIS — L02.211 ABDOMINAL WALL ABSCESS: ICD-10-CM

## 2019-11-20 PROCEDURE — 99213 OFFICE O/P EST LOW 20 MIN: CPT

## 2019-11-20 NOTE — PROGRESS NOTES
"Subjective:       Patient ID: Azucena Morrison is a 69 y.o. female.    Chief Complaint: Wound Care    Chief Complaint: Wound Care     6/29/17: Pt re-admit for distal abdominal wound. Pt denies any fevers or chills but states she feels nauseous at times. Returned to [place] June 28, from [place], reports much trouble with abdominal wound while in [place].  7/6/17-- Patient scheduled for surgical drainage of wound Tuesday 7/11/17DB  7/19/17-- Patient post op clinic visit.  8/16/17: CT of abdomen and pelvis done 8/9/17 due to suspected fistula  8/23/17-- U/S of abdomen done today.  12/13/17 Wound vac with 20cc drainage in clinic today.  1/10/18: on PO abx  1/24/18: Fistulagram ordered per Dr. Horton. Patient still on PO abx  02/21/18 Patient is not on antibiotics at this time. BS fasting 135 per patient report this am.  03/07/18 Culture of Anterior Abdominal Wound is positive. No antibiotics at this time.  fasting per patient report.    03/21/18 Patient c/o nausea along with abdominal tenderness near abd midline wound. Patient states that pain level is 6 and that she passed two sero-sanguineous clots from wound. Patient is afebrile this am.  3/28/18: patient continue antibiotics and reports that pain is less than last weeks clinic visit. Drainage has decreased as well  04/04/18 Patient states that abdominal pain is "pressure" sensation and that when she pushes down on her abdomen on either side of abdominal wound she feels like she has to urinate. Patient reports pain level of 3 this am.  fasting this am per patient report.   5/2/18: Patient had Abdomina Toribio today before wound care. She had discontinued Bactrim PO per Dr. Horton's recommendation and culture results and is now taking Amoxicillin PO  6/20/18: Pt reports itching to wound and periwound   6/27/18: patient reports no new complaints with regards to her wound or abdomen, wound continues to decrease in size and drainage  8/1/18: Pt reports increased " pain to abdomen with nausea and emesis since thursday 7/26/18, denies any fevers, patient did no go to ER as instructed by home health nurse on Sat. 7/28/18.  8/29/18: Patient admitted to hospital 8/2 for abdominal wound complications. Surgery for abdominal abscess per Dr. Horton on 8/3. Patient placed on IV antibiotics and discharged home on 8/18 with [facility]  and PICC line to LUE. Patient currently on IV ertapenem. IV medication sent to patients home per [facility].   9/5/18: IV Ertapenem to continue 1 week. Culture sent to lab. [facility] sent orders. PICC line intact to E.  9/12/18 Antibiotics completed. Culture results pending. 1 deep stitch remaining.  9/12/18: Culture positive for E. Coli, patient to continue Ertapenem IV antibiotics x 2weeks. [facility] notified  9/26/18: F/U abdominal wound, wound continues to improve. Patient will complete IV antibiotics today  10/3/18: patient c/o diarrhea for 2 days and nausea with some vomiting. Labs and stool culture ordered. IV antibiotics discontinued today  10/10/2018 patient will start on IV antibiotic Invaz IV once a day, pending PICC line placement, order placed today  for PICC per Dr. Horton.  10/24/18: patient on IV antibiotics, tolerating well. Continue for 1 week  11/7/2018: IV Antibiotic discontinued.  11/8/2018: PICC line to left upper arm discontinued by Home Health.  11/21/2018 F/u for abdominal wall fistula , c/o nausea  No vomiting , no fever  12/5/2018: F/u for abdominal wall fisula, c/o gas, Dr. Horton will order Bentyl. Surgery schedule for January 2019.  12/12/2018: F/u for abdominal wall fistula, c/o abdominal pain. Dr. Horton ordered Norco 5/325mg tab 1 PO every 4 hours as needed for pain and referred patient for x-ray of abdomen after clinic today.  12/19/2018: F/u for abdominal wall fisula, c/o nausea, Dr. Horton re ordered Ondansetron (Zofran) 8mg one tab by mouth every eight hours as needed for nausea. No changes in  wound condition from last visit noted in clinic today.  12/26/2018: F/u abdominal wall fistula drainage, no c/o at this time. Denies any abdominal pain or nausea. Dr. Horton recommends surgery first week of January 2019 for abdominal fistula treatment and possible colostomy placement, patient agree.  01/02/2019: F/u abdominal wall fistula. Dressing with large amount of drainage, malodorous, Dr. Horton is scheduling surgery for third week of January, 2019.  1/16/19: F/U Dr. Horton today in clinic, surgery scheduled for next Friday 1/25/19 with Dr. Horton.     01/23/2019: Presents to wound care clinic for f/u abdominal wound care tx. Surgery scheduled with Dr. Horton for Friday 01/25/2019. Dr. Horton explained Mrs. Morrison surgery procedure including possible complications, Nurse  (Faroese) present, patient verbalizes understanding of procedure including possible complications, all questions from patient were answered by Dr. Horton including blood sugar and blood pressure medications per patient's concerns. Consent for surgery and blood transfusion signed by patient, Dr. Horton and nurse witnessed.  Abdominal wound cultures collected per Dr. Horton, cultures sent to lab/micro pending results. Dr. Horton prescribed the following orders: fleet enema for Thursday 1/24/2019, clear liquid diet and not to eat after midnight all for 1/24/2019. Start taking Neomycin and erythromycin by mouth three times a day (1/24/2019) and dulcolax one tab by mouth twice a day, Mrs. Morrison voices understanding.     01/30/2019: F/U wound care treatment with Dr. Horton. Per pt, primary physician Dr. Pj Monae ordered for her to take potassium pills for three days, last day today and scheduled for lab work at primary physician clinic on 1/31/2019. Per pt. Feeling better, no more abdominal pain (went to ER 1/24/2019 and discharged 1/25/2019 c/o abd pain.). Dr. Horton awaiting on primary clearance to re-schedule  "surgery, per pt. She will call wound care clinic and Dr. Horton to make aware of clearance day. Continue with same orders for dressing change for Dr. Horton. Mrs. Morrison requesting gentamicin refill.     02/06/2019: F/u with Dr. Horton for wound care treatment. Mrs. Morrison brought to clinic a clearance for surgery by Dr. Pj Sanches dated 02/06/2019 "Hyperkalemia-Resolved K 4.8 2/1/2019, labs , Cr. 1.05, H/H 10/30. No contraindication to surgery, tight control of BS, Hydration." A copy of EKG (1/24/2019) and lab work from [facility] collected 1/31/2019, reported results 2/1/2019. Dr. Horton scheduled surgery for 02/22/2019, consent were signed on 01/23/2019, all questions were answered by Dr. Horton, this nurse  (Vietnamese) present. Education provided to patient on the importance of having a clear liquid diet the day before surgery 02/21/2019 as per Dr. Horton, new medications and preop preparation before surgery, verbalizes understanding. Dr. Horton ordered Norco 5/325 mg PO for pain, Dulcolax two tabs by mouth to take on 02/21/2019, Soap suds enema (SSE) on Thursday 02/21/2019, Golytely by mouth 2 liters on 2/21/2019, Erythromycin 500 mg one tab by mouth three times a day and Neomycin 1 gm by mouth three times a day.     2/13/2019: Presents to wound care clinic for a f/u with Dr. Horton for wound care treatment. No new orders in wound dressing change, reviewed preop orders with Mrs. Morrison per Dr. Horton, all questions answered. Surgery scheduled for 02/22/2019.  2/20/19: Continues wound care a previously ordered.  Depth measured per Dr. Horton 8cm.  Preop orders reviewed with patient who verbalized understanding.  Surgery scheduled for Fri 2/22/19.  Rx given to patient for Norco and Zofran.    03/06/19: Patient admitted to [facility] on 02/22/19 for exploratory laparotomy of abdomen per Dr. Horton. Patient discharged on 02/28/19 with home health and PICC line with IV " "antibiotics. Staples removed today in clinic. Patient denies any fever, chills, n&v; however, she states that she has "low energy." Continued with wound care as ordered.     3/13/2019: F/U with Dr. Horton for wound care treatment. Continue with same wound dressing change orders as per Dr. Horton, orders followed. Home Health to continue wound dressing change and lab draw for CBC weekly; continue IV Ertapenem/Invaz 1 gm IV daily as ordered.     3/20/2019: Clinic visit with Dr. Horton for abdominal abscess treatment. Presents with moderate draining from abdominal wound; wound size decreasing per measurement. Per Dr. Horton, apply one-piece system ostomy bag to abdominal wound for drain collection, may drain bag when it fills and may change every other day, continue applying gentamicin to wound bed before applying ostomy bag. Continue IV Invaz until completion. If ostomy bag not effective for draining collection, may return to previous orders for wound dressing change. Orders followed. Education provided to Mrs. Morrison on handwashing and ostomy care techniques, voices and demonstrates understanding.   3/27/19: Follow up with Dr. Horton today in clinic for abdominal abscess, moderated tanish yellow drainge present on dressing.  Wound slowly improving, patient refused one-piece ostomy bag stated " no it's too messy." requested to return to previous dressing prior to ostomy bag placement- iodoform gauze, aquacel extra, sm abd, and mefix tape.  Silver nitrate x1 per Dr. Horton today in clinic. Rx given to patient for PO Zofran.     04/03/2019: F/u clinic visit with Dr. Horton. Abdominal wound improving in size, picture on file. Wound cultures from abdominal abscess collected and sent to lab/micro, pending results. Mrs. Morrison states going out of the country ([place]) at the end of April and plans to stay there for approximately two months. Dr. Horton ordered IV antibiotic for two more weeks and new wound care " dressing, orders followed.     4/9/2019: Clinic visit with Dr. Horton.  Per Dr. Horton, wound deep measurement increased, draining present. Wound cultures from abdominal wound taken and sent to lab/micro, pending results. C/o abdominal pain and itching around wound, Rx for betamethasone cream 01.% and Norco 5/325mg give in clinic by Dr. Horton. Wound care dressing orders followed. Continue IV antibiotic as previously ordered.  04/17/19: F/u for non-healing wound to abdomen. Culture report negative. Dr. Horton ordered 1 more week of IV antibiotics. Continue with topical wound care as ordered.     4/24/2019: Clinic visit with Dr. Horton for abdominal wound treatment. Wound improvement present, pictures on file. Last IV antibiotic today 4/24/2019, [facility] to discontinue PICC line from left upper arm on 4/25/2019. Per Lyubov Prince, going to [place] on Friday 4/26/2019 and out of the country ([place]) on Monday 4/29/2019. This nurse spoke to Shaunna at [employer] (IV antibiotic delivery company) and informed last dose of antibiotic is today. Dr. Horton ordered Hydrocodone-acetaminophen (Norco) 5-325 mg (32 tabs), Bentyl 10 mg (120 capsules) and Gentamycin ointment. Education provided on the importance of eating food that contains iron (to prevent anemia) as well as eating meat and taking her blood pressure medications (blood pressure medication) on time, voices understanding. All questions answered by Dr. Horton. [facility] outpatient confirmed Betamethasone cream ordered on 4/10/2019 is ready for . Instructions and education provided to Mrs. Morrison on abdominal wound dressing changes, hand washing techniques and medication use, effects and side effects, voices and demonstrates understanding. Discharged home on stable conditions, [facility] will give wound care clinic a call when she is back from [place] in few months from now.     7/17/19: Readmit today to wound care clinic.  Patient was  recently out of the country visiting family in [place].  Patient complains of pain and nausea as on prior visits.  Dr. Horton seen patient today discussed with patient again placing colostomy patient refused.  Culture of wound taken, Dr. Horton restarted patient on zofran po for nausea, and Norco for pain. [facility] restarted today in clinic on Mondays and Fridays and prn. Orders given to gently pack wound with aquacel ag rope, cover with aquacel extra, 4x4 gauze, small abd pad and mefix tape change dressing ever other day by [facility] and Prn per patient.     7/24/2019: Clinic visit with Dr. Horton. Continue with dressing change as ordered. Wound cultures reviewed with patient, lab work ordered by Dr. Horton, no fever present or reported at this time. C/o decrease appetite, medication periactin prescribed. Requesting needles for insulin pen, order prescribed by Dr. Horton.      7/31/2019: F/U clinic visit with Dr. Horton. C/o of excruciated back pain 10/10, not feeling well, lot of gas and left upper quadrant discomfort. New orders prescribed: Augmentin 875-125 mg by mouth twice a day. Bentyl 10 mg one tab by mouth 4 times a day. Tramadol 50 mg one tab by mouth every 6 hours as needed for pain. Abdomen ultrasound. Wound care dressing completed as ordered.     8/7/2019: Clinic visit with Dr. Horton, denies any pain at this time. Wound care dressing done as ordered, no changes in wound size from last visit noted. Continue taking oral antiiotic as ordered, pending abdominal ultrasound, scheduled for 8/13/2019.     8/14/2019: F/U clinic visit with Dr. Horton. Admitted and discharged from emergency department this morning with abdominal pain, CT and ultrasound in filed. Dr. Horton performed a small drainage from the abdominal wound, moderate amount of creamy, serosanguineous fluid from abdominal wound present. Iodoform gauze packed into her wound, dressing changed as ordered. Wound cultures  collected and sent to lab/micro, pending results. Rx for Norco 5/325 mg one tab PO every 4 hours PRN as needed # 24 given to pt by Dr. Horton.      8/21/2019: Clinic visit with Dr. Horton. Wound dressing changed as ordered.      11/6/19: Follow up appt with Dr Horton. Wounds improving slowly. Continues to take antibiotics (Augmentin 875-125mg). No complaints voiced. Follow up in 1 week.  11/13/19:  F/U clinic visit with Dr. Horton.  Wound depth per Dr. Horton is 8.5 cm.  Continue PT Augmentin.  Patient is co left sided abdominal pain.  Abdominal US and labs ordered.  Wound care tolerated well.  Fu with Dr. Horton in 1 week.     11/20/19:Dr Horton assessed patient. Silver nitrate applied to wound. Continuing present plan of care. Patient is scheduled to have abdominal ultra sound Friday 11/22/19. Continues to take Augmentin.  F/U in 1 week.      Review of Systems   Constitutional: Negative.    HENT: Negative.    Eyes: Negative.    Respiratory: Negative.    Cardiovascular: Negative.    Gastrointestinal: Negative.    Genitourinary: Negative.    Musculoskeletal: Negative.    Skin: Negative.    Neurological: Negative.    Psychiatric/Behavioral: Negative.        Objective:      Physical Exam   Constitutional: She is oriented to person, place, and time. She appears well-developed and well-nourished.   HENT:   Head: Normocephalic.   Eyes: Pupils are equal, round, and reactive to light. Conjunctivae and EOM are normal.   Neck: Normal range of motion. Neck supple.   Cardiovascular: Normal rate, regular rhythm, normal heart sounds and intact distal pulses.   Pulmonary/Chest: Effort normal and breath sounds normal.   Abdominal: Soft. Bowel sounds are normal.   Musculoskeletal: Normal range of motion.   Neurological: She is alert and oriented to person, place, and time. She has normal reflexes.   Skin: Skin is warm and dry.       Assessment:       1. Abscess of abdominal wall    2. Abdominal wall abscess            Incision/Site 02/22/19 1146 Abdomen midline midline (Active)   02/22/19 1146    Present Prior to Hospital Arrival?:    Side:    Location: Abdomen   Orientation: midline   Incision Type: midline   Closure Method:    Additional Comments:    Removal Indication and Assessment:    Wound Outcome: Palliative   Removal Indications:    Dressing Appearance Intact;Moist drainage 11/20/2019  9:00 AM   Drainage Amount Moderate 11/20/2019  9:00 AM   Drainage Characteristics/Odor Serosanguineous 11/20/2019  9:00 AM   Appearance Red;Moist 11/20/2019  9:00 AM   Red (%), Wound Tissue Color 100 % 11/20/2019  9:00 AM   Periwound Area Intact;Dry 11/20/2019  9:00 AM   Wound Edges Defined 11/20/2019  9:00 AM   Wound Length (cm) 0.4 cm 11/20/2019  9:00 AM   Wound Width (cm) 0.4 cm 11/20/2019  9:00 AM   Wound Depth (cm) 0.5 cm 11/20/2019  9:00 AM   Wound Volume (cm^3) 0.08 cm^3 11/20/2019  9:00 AM   Wound Surface Area (cm^2) 0.16 cm^2 11/20/2019  9:00 AM   Care Cleansed with:;Sterile normal saline 11/20/2019  9:00 AM   Dressing Changed;Calcium alginate;Gauze;Other (see comments) 11/20/2019  9:00 AM   Packing gauze, iodoform 11/20/2019  9:00 AM   Packing Inserted  1 11/20/2019  9:00 AM   Periwound Care Absorptive dressing applied 11/20/2019  9:00 AM   Dressing Change Due 11/22/19 11/20/2019  9:00 AM   Clean wound with normal saline  Lidocaine 2% gel or 4 % Topical solution: PRN  Chauncey wound:  Maintain dry chauncey wound, Cavilon under tape, betamethasone PRN itching  Primary dressing: Apply gentamycin to wound bed, pack wound with Iodoform gauze   Secondary dressing: cover with 4x4 gauze, small ABD pad, secure with mefix tape.  Frequency: Mon, Wed, Fri  Follow up with Dr. Horton in 1 week on Wed 11/2719    Home Health:  [facility]:  Home Health nurse to assess wound and perform wound care on Mon, Fri, and PRN  Other orders: Continue po antibiotic Augmentin.   US of abdomen and labs ordered 11/13/19   Dr Horton assessed patient. Silver  nitrate applied to wound. Continuing present plan of care. Patient is scheduled to have abdominal ultra sound Friday 11/22/19. F/U in 1 week.        Plan:                Follow up in about 1 week (around 11/27/2019).

## 2019-11-22 ENCOUNTER — HOSPITAL ENCOUNTER (OUTPATIENT)
Dept: RADIOLOGY | Facility: HOSPITAL | Age: 69
Discharge: HOME | End: 2019-11-22
Attending: SURGERY
Payer: MEDICARE

## 2019-11-22 ENCOUNTER — TELEPHONE (OUTPATIENT)
Dept: HOME HEALTH SERVICES | Facility: HOSPITAL | Age: 69
End: 2019-11-22

## 2019-11-22 DIAGNOSIS — E11.628 DIABETES WITH SKIN COMPLICATION: ICD-10-CM

## 2019-11-22 DIAGNOSIS — L02.211 ABSCESS OF ABDOMINAL WALL: ICD-10-CM

## 2019-11-22 DIAGNOSIS — L02.211 ABDOMINAL WALL ABSCESS: ICD-10-CM

## 2019-11-22 DIAGNOSIS — K63.2 COLOCUTANEOUS FISTULA: ICD-10-CM

## 2019-11-22 DIAGNOSIS — K63.2 COLONIC FISTULA: ICD-10-CM

## 2019-11-22 PROCEDURE — 76705 ECHO EXAM OF ABDOMEN: CPT | Mod: 26,,, | Performed by: RADIOLOGY

## 2019-11-22 PROCEDURE — 76705 US ABDOMEN LIMITED: ICD-10-PCS | Mod: 26,,, | Performed by: RADIOLOGY

## 2019-11-22 PROCEDURE — 76705 ECHO EXAM OF ABDOMEN: CPT | Mod: TC

## 2019-11-27 ENCOUNTER — HOSPITAL ENCOUNTER (OUTPATIENT)
Dept: WOUND CARE | Facility: HOSPITAL | Age: 69
Discharge: HOME | End: 2019-11-27
Attending: SURGERY
Payer: MEDICARE

## 2019-11-27 DIAGNOSIS — E11.9 TYPE 2 DIABETES MELLITUS WITHOUT COMPLICATION, WITHOUT LONG-TERM CURRENT USE OF INSULIN: ICD-10-CM

## 2019-11-27 DIAGNOSIS — Z98.890 S/P EXPLORATORY LAPAROTOMY: ICD-10-CM

## 2019-11-27 DIAGNOSIS — I10 ESSENTIAL HYPERTENSION: ICD-10-CM

## 2019-11-27 DIAGNOSIS — L08.9 LOCAL INFECTION OF SKIN AND SUBCUTANEOUS TISSUE: ICD-10-CM

## 2019-11-27 DIAGNOSIS — L02.211 ABSCESS OF ABDOMINAL WALL: Primary | ICD-10-CM

## 2019-11-27 DIAGNOSIS — E11.628 DIABETES WITH SKIN COMPLICATION: ICD-10-CM

## 2019-11-27 PROCEDURE — 99213 OFFICE O/P EST LOW 20 MIN: CPT

## 2019-11-27 NOTE — PROGRESS NOTES
"Subjective:       Patient ID: Azucena Morrison is a 69 y.o. female.    Chief Complaint: No chief complaint on file.    Chief Complaint: Wound Care     6/29/17: Pt re-admit for distal abdominal wound. Pt denies any fevers or chills but states she feels nauseous at times. Returned to [place] June 28, from [place], reports much trouble with abdominal wound while in [place].  7/6/17-- Patient scheduled for surgical drainage of wound Tuesday 7/11/17DB  7/19/17-- Patient post op clinic visit.  8/16/17: CT of abdomen and pelvis done 8/9/17 due to suspected fistula  8/23/17-- U/S of abdomen done today.  12/13/17 Wound vac with 20cc drainage in clinic today.  1/10/18: on PO abx  1/24/18: Fistulagram ordered per Dr. Horton. Patient still on PO abx  02/21/18 Patient is not on antibiotics at this time. BS fasting 135 per patient report this am.  03/07/18 Culture of Anterior Abdominal Wound is positive. No antibiotics at this time.  fasting per patient report.    03/21/18 Patient c/o nausea along with abdominal tenderness near abd midline wound. Patient states that pain level is 6 and that she passed two sero-sanguineous clots from wound. Patient is afebrile this am.  3/28/18: patient continue antibiotics and reports that pain is less than last weeks clinic visit. Drainage has decreased as well  04/04/18 Patient states that abdominal pain is "pressure" sensation and that when she pushes down on her abdomen on either side of abdominal wound she feels like she has to urinate. Patient reports pain level of 3 this am.  fasting this am per patient report.   5/2/18: Patient had Abdomina Toribio today before wound care. She had discontinued Bactrim PO per Dr. Horton's recommendation and culture results and is now taking Amoxicillin PO  6/20/18: Pt reports itching to wound and periwound   6/27/18: patient reports no new complaints with regards to her wound or abdomen, wound continues to decrease in size and drainage  8/1/18: Pt " reports increased pain to abdomen with nausea and emesis since thursday 7/26/18, denies any fevers, patient did no go to ER as instructed by home health nurse on Sat. 7/28/18.  8/29/18: Patient admitted to hospital 8/2 for abdominal wound complications. Surgery for abdominal abscess per Dr. Horton on 8/3. Patient placed on IV antibiotics and discharged home on 8/18 with [facility]  and PICC line to LUE. Patient currently on IV ertapenem. IV medication sent to patients home per [facility].   9/5/18: IV Ertapenem to continue 1 week. Culture sent to lab. [facility] sent orders. PICC line intact to E.  9/12/18 Antibiotics completed. Culture results pending. 1 deep stitch remaining.  9/12/18: Culture positive for E. Coli, patient to continue Ertapenem IV antibiotics x 2weeks. [facility] notified  9/26/18: F/U abdominal wound, wound continues to improve. Patient will complete IV antibiotics today  10/3/18: patient c/o diarrhea for 2 days and nausea with some vomiting. Labs and stool culture ordered. IV antibiotics discontinued today  10/10/2018 patient will start on IV antibiotic Invaz IV once a day, pending PICC line placement, order placed today  for PICC per Dr. Horton.  10/24/18: patient on IV antibiotics, tolerating well. Continue for 1 week  11/7/2018: IV Antibiotic discontinued.  11/8/2018: PICC line to left upper arm discontinued by Home Health.  11/21/2018 F/u for abdominal wall fistula , c/o nausea  No vomiting , no fever  12/5/2018: F/u for abdominal wall fisula, c/o gas, Dr. Horton will order Bentyl. Surgery schedule for January 2019.  12/12/2018: F/u for abdominal wall fistula, c/o abdominal pain. Dr. Horton ordered Norco 5/325mg tab 1 PO every 4 hours as needed for pain and referred patient for x-ray of abdomen after clinic today.  12/19/2018: F/u for abdominal wall fisula, c/o nausea, Dr. Horton re ordered Ondansetron (Zofran) 8mg one tab by mouth every eight hours as needed for  nausea. No changes in wound condition from last visit noted in clinic today.  12/26/2018: F/u abdominal wall fistula drainage, no c/o at this time. Denies any abdominal pain or nausea. Dr. Horton recommends surgery first week of January 2019 for abdominal fistula treatment and possible colostomy placement, patient agree.  01/02/2019: F/u abdominal wall fistula. Dressing with large amount of drainage, malodorous, Dr. Horton is scheduling surgery for third week of January, 2019.  1/16/19: F/U Dr. Horton today in clinic, surgery scheduled for next Friday 1/25/19 with Dr. Horton.     01/23/2019: Presents to wound care clinic for f/u abdominal wound care tx. Surgery scheduled with Dr. Horton for Friday 01/25/2019. Dr. Horton explained Mrs. Morrison surgery procedure including possible complications, Nurse  (Jamaican) present, patient verbalizes understanding of procedure including possible complications, all questions from patient were answered by Dr. Horton including blood sugar and blood pressure medications per patient's concerns. Consent for surgery and blood transfusion signed by patient, Dr. Horton and nurse witnessed.  Abdominal wound cultures collected per Dr. Horton, cultures sent to lab/micro pending results. Dr. Horton prescribed the following orders: fleet enema for Thursday 1/24/2019, clear liquid diet and not to eat after midnight all for 1/24/2019. Start taking Neomycin and erythromycin by mouth three times a day (1/24/2019) and dulcolax one tab by mouth twice a day, Mrs. Morrison voices understanding.     01/30/2019: F/U wound care treatment with Dr. Horton. Per pt, primary physician Dr. Pj Monae ordered for her to take potassium pills for three days, last day today and scheduled for lab work at primary physician clinic on 1/31/2019. Per pt. Feeling better, no more abdominal pain (went to ER 1/24/2019 and discharged 1/25/2019 c/o abd pain.). Dr. Horton awaiting on primary clearance  "to re-schedule surgery, per pt. She will call wound care clinic and Dr. Horton to make aware of clearance day. Continue with same orders for dressing change for Dr. Horton. Mrs. Morrison requesting gentamicin refill.     02/06/2019: F/u with Dr. Horton for wound care treatment. Mrs. Morrison brought to clinic a clearance for surgery by Dr. Pj Sanches dated 02/06/2019 "Hyperkalemia-Resolved K 4.8 2/1/2019, labs , Cr. 1.05, H/H 10/30. No contraindication to surgery, tight control of BS, Hydration." A copy of EKG (1/24/2019) and lab work from [facility] collected 1/31/2019, reported results 2/1/2019. Dr. Horton scheduled surgery for 02/22/2019, consent were signed on 01/23/2019, all questions were answered by Dr. Horton, this nurse  (Iranian) present. Education provided to patient on the importance of having a clear liquid diet the day before surgery 02/21/2019 as per Dr. Horton, new medications and preop preparation before surgery, verbalizes understanding. Dr. Horton ordered Norco 5/325 mg PO for pain, Dulcolax two tabs by mouth to take on 02/21/2019, Soap suds enema (SSE) on Thursday 02/21/2019, Golytely by mouth 2 liters on 2/21/2019, Erythromycin 500 mg one tab by mouth three times a day and Neomycin 1 gm by mouth three times a day.     2/13/2019: Presents to wound care clinic for a f/u with Dr. Horton for wound care treatment. No new orders in wound dressing change, reviewed preop orders with Mrs. Morrison per Dr. Horton, all questions answered. Surgery scheduled for 02/22/2019.  2/20/19: Continues wound care a previously ordered.  Depth measured per Dr. Horton 8cm.  Preop orders reviewed with patient who verbalized understanding.  Surgery scheduled for Fri 2/22/19.  Rx given to patient for Norco and Zofran.    03/06/19: Patient admitted to [facility] on 02/22/19 for exploratory laparotomy of abdomen per Dr. Horton. Patient discharged on 02/28/19 with home health and PICC " "line with IV antibiotics. Staples removed today in clinic. Patient denies any fever, chills, n&v; however, she states that she has "low energy." Continued with wound care as ordered.     3/13/2019: F/U with Dr. Horton for wound care treatment. Continue with same wound dressing change orders as per Dr. Horton, orders followed. Home Health to continue wound dressing change and lab draw for CBC weekly; continue IV Ertapenem/Invaz 1 gm IV daily as ordered.     3/20/2019: Clinic visit with Dr. Horton for abdominal abscess treatment. Presents with moderate draining from abdominal wound; wound size decreasing per measurement. Per Dr. Horton, apply one-piece system ostomy bag to abdominal wound for drain collection, may drain bag when it fills and may change every other day, continue applying gentamicin to wound bed before applying ostomy bag. Continue IV Invaz until completion. If ostomy bag not effective for draining collection, may return to previous orders for wound dressing change. Orders followed. Education provided to Mrs. Morrison on handwashing and ostomy care techniques, voices and demonstrates understanding.   3/27/19: Follow up with Dr. Horton today in clinic for abdominal abscess, moderated tanish yellow drainge present on dressing.  Wound slowly improving, patient refused one-piece ostomy bag stated " no it's too messy." requested to return to previous dressing prior to ostomy bag placement- iodoform gauze, aquacel extra, sm abd, and mefix tape.  Silver nitrate x1 per Dr. Horton today in clinic. Rx given to patient for PO Zofran.     04/03/2019: F/u clinic visit with Dr. Horton. Abdominal wound improving in size, picture on file. Wound cultures from abdominal abscess collected and sent to lab/micro, pending results. Mrs. Morrison states going out of the country ([place]) at the end of April and plans to stay there for approximately two months. Dr. Horton ordered IV antibiotic for two more weeks and " new wound care dressing, orders followed.     4/9/2019: Clinic visit with Dr. Horton.  Per Dr. Horton, wound deep measurement increased, draining present. Wound cultures from abdominal wound taken and sent to lab/micro, pending results. C/o abdominal pain and itching around wound, Rx for betamethasone cream 01.% and Norco 5/325mg give in clinic by Dr. Horton. Wound care dressing orders followed. Continue IV antibiotic as previously ordered.  04/17/19: F/u for non-healing wound to abdomen. Culture report negative. Dr. Horton ordered 1 more week of IV antibiotics. Continue with topical wound care as ordered.     4/24/2019: Clinic visit with Dr. Horton for abdominal wound treatment. Wound improvement present, pictures on file. Last IV antibiotic today 4/24/2019, [facility] to discontinue PICC line from left upper arm on 4/25/2019. Per Lyubov Prince, going to [place] on Friday 4/26/2019 and out of the country ([place]) on Monday 4/29/2019. This nurse spoke to Shaunna at [employer] ([employer] antibiotic delivery company) and informed last dose of antibiotic is today. Dr. Horton ordered Hydrocodone-acetaminophen (Norco) 5-325 mg (32 tabs), Bentyl 10 mg (120 capsules) and Gentamycin ointment. Education provided on the importance of eating food that contains iron (to prevent anemia) as well as eating meat and taking her blood pressure medications (blood pressure medication) on time, voices understanding. All questions answered by Dr. Horton. [facility] outpatient confirmed Betamethasone cream ordered on 4/10/2019 is ready for . Instructions and education provided to Mrs. Morrison on abdominal wound dressing changes, hand washing techniques and medication use, effects and side effects, voices and demonstrates understanding. Discharged home on stable conditions, [facility] will give wound care clinic a call when she is back from [place] in few months from now.     7/17/19: Readmit today to wound care clinic.   Patient was recently out of the country visiting family in [place].  Patient complains of pain and nausea as on prior visits.  Dr. Horton seen patient today discussed with patient again placing colostomy patient refused.  Culture of wound taken, Dr. Horton restarted patient on zofran po for nausea, and Norco for pain. [facility] restarted today in clinic on Mondays and Fridays and prn. Orders given to gently pack wound with aquacel ag rope, cover with aquacel extra, 4x4 gauze, small abd pad and mefix tape change dressing ever other day by [facility] and Prn per patient.     7/24/2019: Clinic visit with Dr. Horton. Continue with dressing change as ordered. Wound cultures reviewed with patient, lab work ordered by Dr. Horton, no fever present or reported at this time. C/o decrease appetite, medication periactin prescribed. Requesting needles for insulin pen, order prescribed by Dr. Horton.      7/31/2019: F/U clinic visit with Dr. Horton. C/o of excruciated back pain 10/10, not feeling well, lot of gas and left upper quadrant discomfort. New orders prescribed: Augmentin 875-125 mg by mouth twice a day. Bentyl 10 mg one tab by mouth 4 times a day. Tramadol 50 mg one tab by mouth every 6 hours as needed for pain. Abdomen ultrasound. Wound care dressing completed as ordered.     8/7/2019: Clinic visit with Dr. Horton, denies any pain at this time. Wound care dressing done as ordered, no changes in wound size from last visit noted. Continue taking oral antiiotic as ordered, pending abdominal ultrasound, scheduled for 8/13/2019.     8/14/2019: F/U clinic visit with Dr. Horton. Admitted and discharged from emergency department this morning with abdominal pain, CT and ultrasound in filed. Dr. Horton performed a small drainage from the abdominal wound, moderate amount of creamy, serosanguineous fluid from abdominal wound present. Iodoform gauze packed into her wound, dressing changed as ordered. Wound  cultures collected and sent to lab/micro, pending results. Rx for Norco 5/325 mg one tab PO every 4 hours PRN as needed # 24 given to pt by Dr. Horton.      8/21/2019: Clinic visit with Dr. Horton. Wound dressing changed as ordered.      11/6/19: Follow up appt with Dr Horton. Wounds improving slowly. Continues to take antibiotics (Augmentin 875-125mg). No complaints voiced. Follow up in 1 week.  11/13/19:  F/U clinic visit with Dr. Horton.  Wound depth per Dr. Horton is 8.5 cm.  Continue PT Augmentin.  Patient is co left sided abdominal pain.  Abdominal US and labs ordered.  Wound care tolerated well.  Fu with Dr. Horton in 1 week.     11/20/19:Dr Horton assessed patient. Silver nitrate applied to wound. Continuing present plan of care. Patient is scheduled to have abdominal ultra sound Friday 11/22/19. Continues to take Augmentin.  F/U in 1 week.    11/27/19: Follow up in clinic with Dr. Horton. Wound depth 8.5cm, silver nitrate x 3 per Dr. Horton, patient continues taking po Augmentin, c/o left sided abdominal pain, ultrasound results discussed with patient, Dr. Horton will watch for now.  Follow up 1 week 12/4/19.     Review of Systems   Constitutional: Negative.    HENT: Negative.    Eyes: Negative.    Respiratory: Negative.    Cardiovascular: Negative.    Gastrointestinal: Negative.    Genitourinary: Negative.    Musculoskeletal: Negative.    Skin: Negative.    Neurological: Negative.    Psychiatric/Behavioral: Negative.        Objective:      Physical Exam   Constitutional: She is oriented to person, place, and time. She appears well-developed and well-nourished.   HENT:   Head: Normocephalic.   Eyes: Pupils are equal, round, and reactive to light. Conjunctivae and EOM are normal.   Neck: Normal range of motion. Neck supple.   Cardiovascular: Normal rate, regular rhythm, normal heart sounds and intact distal pulses.   Pulmonary/Chest: Effort normal and breath sounds normal.   Abdominal: Soft. Bowel  sounds are normal.   Musculoskeletal: Normal range of motion.   Neurological: She is alert and oriented to person, place, and time. She has normal reflexes.   Skin: Skin is warm and dry.       Assessment:       No diagnosis found.       Incision/Site 02/22/19 1146 Abdomen midline midline (Active)   02/22/19 1146    Present Prior to Hospital Arrival?:    Side:    Location: Abdomen   Orientation: midline   Incision Type: midline   Closure Method:    Additional Comments:    Removal Indication and Assessment:    Wound Outcome: Palliative   Removal Indications:    Dressing Appearance Moist drainage 11/27/2019  3:05 PM   Drainage Amount Moderate 11/27/2019  3:05 PM   Drainage Characteristics/Odor Serosanguineous 11/27/2019  3:05 PM   Appearance Red;Moist 11/27/2019  3:05 PM   Red (%), Wound Tissue Color 100 % 11/27/2019  3:05 PM   Periwound Area Intact;Dry 11/27/2019  3:05 PM   Wound Edges Defined 11/27/2019  3:05 PM   Wound Length (cm) 0.3 cm 11/27/2019  3:05 PM   Wound Width (cm) 0.3 cm 11/27/2019  3:05 PM   Wound Depth (cm) 8.5 cm 11/27/2019  3:05 PM   Wound Volume (cm^3) 0.76 cm^3 11/27/2019  3:05 PM   Wound Surface Area (cm^2) 0.09 cm^2 11/27/2019  3:05 PM   Tunneling (depth (cm)/location) @5:00 7.5cm 11/27/2019  3:05 PM   Care Sterile normal saline 11/27/2019  3:05 PM   Dressing Changed;Applied;Silver 11/27/2019  3:05 PM           Clean wound with normal saline  Lidocaine 2% gel or 4 % Topical solution: PRN  Silver Nitrate x 3 per Dr. Horton  Chauncey wound:  Maintain dry chauncey wound, Cavilon under tape, betamethasone PRN itching  Primary dressing: Apply gentamycin to wound bed, pack wound with Iodoform gauze   Secondary dressing: cover with 4x4 gauze, small ABD pad, secure with mefix tape.  Frequency: Mon, Wed, Fri  Follow up with Dr. Horton in 1 week on Wed 12/4/19    Home Health:  [facility]:  Home Health nurse to assess wound and perform wound care on Mon, Fri, and PRN  Other orders: Continue po antibiotic  Augmentin.         Plan:                12/4/19 Dr. Horton

## 2019-12-02 ENCOUNTER — TELEPHONE (OUTPATIENT)
Dept: HOME HEALTH SERVICES | Facility: HOSPITAL | Age: 69
End: 2019-12-02

## 2019-12-04 ENCOUNTER — HOSPITAL ENCOUNTER (OUTPATIENT)
Dept: WOUND CARE | Facility: HOSPITAL | Age: 69
Discharge: HOME | End: 2019-12-04
Attending: SURGERY
Payer: MEDICARE

## 2019-12-04 VITALS
TEMPERATURE: 98 F | BODY MASS INDEX: 30.91 KG/M2 | HEIGHT: 62 IN | DIASTOLIC BLOOD PRESSURE: 80 MMHG | HEART RATE: 83 BPM | SYSTOLIC BLOOD PRESSURE: 156 MMHG | WEIGHT: 168 LBS

## 2019-12-04 DIAGNOSIS — E11.620 TYPE 2 DIABETES MELLITUS WITH DIABETIC DERMATITIS, WITHOUT LONG-TERM CURRENT USE OF INSULIN: ICD-10-CM

## 2019-12-04 DIAGNOSIS — L02.211 ABDOMINAL WALL ABSCESS: ICD-10-CM

## 2019-12-04 DIAGNOSIS — R10.84 GENERALIZED ABDOMINAL PAIN: ICD-10-CM

## 2019-12-04 DIAGNOSIS — E11.628 DIABETES WITH SKIN COMPLICATION: Primary | ICD-10-CM

## 2019-12-04 DIAGNOSIS — K63.2 COLONIC FISTULA: ICD-10-CM

## 2019-12-04 DIAGNOSIS — L08.9 LOCAL INFECTION OF SKIN AND SUBCUTANEOUS TISSUE: ICD-10-CM

## 2019-12-04 PROCEDURE — 99213 OFFICE O/P EST LOW 20 MIN: CPT

## 2019-12-04 NOTE — PROGRESS NOTES
"Subjective:       Patient ID: Azucena Morrison is a 69 y.o. female.    Chief Complaint: Non-healing Wound    Chief Complaint: Wound Care     6/29/17: Pt re-admit for distal abdominal wound. Pt denies any fevers or chills but states she feels nauseous at times. Returned to [place] June 28, from [place], reports much trouble with abdominal wound while in [place].  7/6/17-- Patient scheduled for surgical drainage of wound Tuesday 7/11/17DB  7/19/17-- Patient post op clinic visit.  8/16/17: CT of abdomen and pelvis done 8/9/17 due to suspected fistula  8/23/17-- U/S of abdomen done today.  12/13/17 Wound vac with 20cc drainage in clinic today.  1/10/18: on PO abx  1/24/18: Fistulagram ordered per Dr. Horton. Patient still on PO abx  02/21/18 Patient is not on antibiotics at this time. BS fasting 135 per patient report this am.  03/07/18 Culture of Anterior Abdominal Wound is positive. No antibiotics at this time.  fasting per patient report.    03/21/18 Patient c/o nausea along with abdominal tenderness near abd midline wound. Patient states that pain level is 6 and that she passed two sero-sanguineous clots from wound. Patient is afebrile this am.  3/28/18: patient continue antibiotics and reports that pain is less than last weeks clinic visit. Drainage has decreased as well  04/04/18 Patient states that abdominal pain is "pressure" sensation and that when she pushes down on her abdomen on either side of abdominal wound she feels like she has to urinate. Patient reports pain level of 3 this am.  fasting this am per patient report.   5/2/18: Patient had Abdomina Toribio today before wound care. She had discontinued Bactrim PO per Dr. Horton's recommendation and culture results and is now taking Amoxicillin PO  6/20/18: Pt reports itching to wound and periwound   6/27/18: patient reports no new complaints with regards to her wound or abdomen, wound continues to decrease in size and drainage  8/1/18: Pt reports " increased pain to abdomen with nausea and emesis since thursday 7/26/18, denies any fevers, patient did no go to ER as instructed by home health nurse on Sat. 7/28/18.  8/29/18: Patient admitted to hospital 8/2 for abdominal wound complications. Surgery for abdominal abscess per Dr. Horton on 8/3. Patient placed on IV antibiotics and discharged home on 8/18 with [facility]  and PICC line to LUE. Patient currently on IV ertapenem. IV medication sent to patients home per [facility].   9/5/18: IV Ertapenem to continue 1 week. Culture sent to lab. [facility] sent orders. PICC line intact to E.  9/12/18 Antibiotics completed. Culture results pending. 1 deep stitch remaining.  9/12/18: Culture positive for E. Coli, patient to continue Ertapenem IV antibiotics x 2weeks. [facility] notified  9/26/18: F/U abdominal wound, wound continues to improve. Patient will complete IV antibiotics today  10/3/18: patient c/o diarrhea for 2 days and nausea with some vomiting. Labs and stool culture ordered. IV antibiotics discontinued today  10/10/2018 patient will start on IV antibiotic Invaz IV once a day, pending PICC line placement, order placed today  for PICC per Dr. Horton.  10/24/18: patient on IV antibiotics, tolerating well. Continue for 1 week  11/7/2018: IV Antibiotic discontinued.  11/8/2018: PICC line to left upper arm discontinued by Home Health.  11/21/2018 F/u for abdominal wall fistula , c/o nausea  No vomiting , no fever  12/5/2018: F/u for abdominal wall fisula, c/o gas, Dr. Horton will order Bentyl. Surgery schedule for January 2019.  12/12/2018: F/u for abdominal wall fistula, c/o abdominal pain. Dr. Horton ordered Norco 5/325mg tab 1 PO every 4 hours as needed for pain and referred patient for x-ray of abdomen after clinic today.  12/19/2018: F/u for abdominal wall fisula, c/o nausea, Dr. Horton re ordered Ondansetron (Zofran) 8mg one tab by mouth every eight hours as needed for nausea. No  changes in wound condition from last visit noted in clinic today.  12/26/2018: F/u abdominal wall fistula drainage, no c/o at this time. Denies any abdominal pain or nausea. Dr. Horton recommends surgery first week of January 2019 for abdominal fistula treatment and possible colostomy placement, patient agree.  01/02/2019: F/u abdominal wall fistula. Dressing with large amount of drainage, malodorous, Dr. Horton is scheduling surgery for third week of January, 2019.  1/16/19: F/U Dr. Horton today in clinic, surgery scheduled for next Friday 1/25/19 with Dr. Horton.     01/23/2019: Presents to wound care clinic for f/u abdominal wound care tx. Surgery scheduled with Dr. Horton for Friday 01/25/2019. Dr. Horton explained Mrs. Morrison surgery procedure including possible complications, Nurse  ([place]) present, patient verbalizes understanding of procedure including possible complications, all questions from patient were answered by Dr. Horton including blood sugar and blood pressure medications per patient's concerns. Consent for surgery and blood transfusion signed by patient, Dr. Horton and nurse witnessed.  Abdominal wound cultures collected per Dr. Horton, cultures sent to lab/micro pending results. Dr. Horton prescribed the following orders: fleet enema for Thursday 1/24/2019, clear liquid diet and not to eat after midnight all for 1/24/2019. Start taking Neomycin and erythromycin by mouth three times a day (1/24/2019) and dulcolax one tab by mouth twice a day, Mrs. Morrison voices understanding.     01/30/2019: F/U wound care treatment with Dr. Horton. Per pt, primary physician Dr. Pj Monae ordered for her to take potassium pills for three days, last day today and scheduled for lab work at primary physician clinic on 1/31/2019. Per pt. Feeling better, no more abdominal pain (went to ER 1/24/2019 and discharged 1/25/2019 c/o abd pain.). Dr. Horton awaiting on primary clearance to  "re-schedule surgery, per pt. She will call wound care clinic and Dr. Horton to make aware of clearance day. Continue with same orders for dressing change for Dr. Horton. Mrs. Morrison requesting gentamicin refill.     02/06/2019: F/u with Dr. Horton for wound care treatment. Mrs. Morrison brought to clinic a clearance for surgery by Dr. Pj Sanches dated 02/06/2019 "Hyperkalemia-Resolved K 4.8 2/1/2019, labs , Cr. 1.05, H/H 10/30. No contraindication to surgery, tight control of BS, Hydration." A copy of EKG (1/24/2019) and lab work from [facility] collected 1/31/2019, reported results 2/1/2019. Dr. Horton scheduled surgery for 02/22/2019, consent were signed on 01/23/2019, all questions were answered by Dr. Horton, this nurse  ([place]) present. Education provided to patient on the importance of having a clear liquid diet the day before surgery 02/21/2019 as per Dr. Horton, new medications and preop preparation before surgery, verbalizes understanding. Dr. Horton ordered Norco 5/325 mg PO for pain, Dulcolax two tabs by mouth to take on 02/21/2019, Soap suds enema (SSE) on Thursday 02/21/2019, Golytely by mouth 2 liters on 2/21/2019, Erythromycin 500 mg one tab by mouth three times a day and Neomycin 1 gm by mouth three times a day.     2/13/2019: Presents to wound care clinic for a f/u with Dr. Horton for wound care treatment. No new orders in wound dressing change, reviewed preop orders with Mrs. Morrison per Dr. Horton, all questions answered. Surgery scheduled for 02/22/2019.  2/20/19: Continues wound care a previously ordered.  Depth measured per Dr. Horton 8cm.  Preop orders reviewed with patient who verbalized understanding.  Surgery scheduled for Fri 2/22/19.  Rx given to patient for Norco and Zofran.    03/06/19: Patient admitted to [facility] on 02/22/19 for exploratory laparotomy of abdomen per Dr. Horton. Patient discharged on 02/28/19 with home health and PICC line " "with IV antibiotics. Staples removed today in clinic. Patient denies any fever, chills, n&v; however, she states that she has "low energy." Continued with wound care as ordered.     3/13/2019: F/U with Dr. Horton for wound care treatment. Continue with same wound dressing change orders as per Dr. Horton, orders followed. Home Health to continue wound dressing change and lab draw for CBC weekly; continue IV Ertapenem/Invaz 1 gm IV daily as ordered.     3/20/2019: Clinic visit with Dr. Horton for abdominal abscess treatment. Presents with moderate draining from abdominal wound; wound size decreasing per measurement. Per Dr. Horton, apply one-piece system ostomy bag to abdominal wound for drain collection, may drain bag when it fills and may change every other day, continue applying gentamicin to wound bed before applying ostomy bag. Continue IV Invaz until completion. If ostomy bag not effective for draining collection, may return to previous orders for wound dressing change. Orders followed. Education provided to Mrs. Morrison on handwashing and ostomy care techniques, voices and demonstrates understanding.   3/27/19: Follow up with Dr. Horton today in clinic for abdominal abscess, moderated tanish yellow drainge present on dressing.  Wound slowly improving, patient refused one-piece ostomy bag stated " no it's too messy." requested to return to previous dressing prior to ostomy bag placement- iodoform gauze, aquacel extra, sm abd, and mefix tape.  Silver nitrate x1 per Dr. Horton today in clinic. Rx given to patient for PO Zofran.     04/03/2019: F/u clinic visit with Dr. Horton. Abdominal wound improving in size, picture on file. Wound cultures from abdominal abscess collected and sent to lab/micro, pending results. Mrs. Morrison states going out of the country ([place]) at the end of April and plans to stay there for approximately two months. Dr. Horton ordered IV antibiotic for two more weeks and new " wound care dressing, orders followed.     4/9/2019: Clinic visit with Dr. Horton.  Per Dr. Horton, wound deep measurement increased, draining present. Wound cultures from abdominal wound taken and sent to lab/micro, pending results. C/o abdominal pain and itching around wound, Rx for betamethasone cream 01.% and Norco 5/325mg give in clinic by Dr. Horton. Wound care dressing orders followed. Continue IV antibiotic as previously ordered.  04/17/19: F/u for non-healing wound to abdomen. Culture report negative. Dr. Horton ordered 1 more week of IV antibiotics. Continue with topical wound care as ordered.     4/24/2019: Clinic visit with Dr. Horton for abdominal wound treatment. Wound improvement present, pictures on file. Last IV antibiotic today 4/24/2019, [facility] to discontinue PICC line from left upper arm on 4/25/2019. Per Lyubov Prince, going to [place] on Friday 4/26/2019 and out of the country ([place]) on Monday 4/29/2019. This nurse spoke to Shaunna at [employer] (IV antibiotic delivery company) and informed last dose of antibiotic is today. Dr. Horton ordered Hydrocodone-acetaminophen (Norco) 5-325 mg (32 tabs), Bentyl 10 mg (120 capsules) and Gentamycin ointment. Education provided on the importance of eating food that contains iron (to prevent anemia) as well as eating meat and taking her blood pressure medications (blood pressure medication) on time, voices understanding. All questions answered by Dr. Horton. [facility] outpatient confirmed Betamethasone cream ordered on 4/10/2019 is ready for . Instructions and education provided to Mrs. Morrison on abdominal wound dressing changes, hand washing techniques and medication use, effects and side effects, voices and demonstrates understanding. Discharged home on stable conditions, [facility] will give wound care clinic a call when she is back from [place] in few months from now.     7/17/19: Readmit today to wound care clinic.   Patient was recently out of the country visiting family in [place].  Patient complains of pain and nausea as on prior visits.  Dr. Horton seen patient today discussed with patient again placing colostomy patient refused.  Culture of wound taken, Dr. Horton restarted patient on zofran po for nausea, and Norco for pain. [facility] restarted today in clinic on Mondays and Fridays and prn. Orders given to gently pack wound with aquacel ag rope, cover with aquacel extra, 4x4 gauze, small abd pad and mefix tape change dressing ever other day by [facility] and Prn per patient.     7/24/2019: Clinic visit with Dr. Horton. Continue with dressing change as ordered. Wound cultures reviewed with patient, lab work ordered by Dr. Horton, no fever present or reported at this time. C/o decrease appetite, medication periactin prescribed. Requesting needles for insulin pen, order prescribed by Dr. Horton.      7/31/2019: F/U clinic visit with Dr. Horton. C/o of excruciated back pain 10/10, not feeling well, lot of gas and left upper quadrant discomfort. New orders prescribed: Augmentin 875-125 mg by mouth twice a day. Bentyl 10 mg one tab by mouth 4 times a day. Tramadol 50 mg one tab by mouth every 6 hours as needed for pain. Abdomen ultrasound. Wound care dressing completed as ordered.     8/7/2019: Clinic visit with Dr. Horton, denies any pain at this time. Wound care dressing done as ordered, no changes in wound size from last visit noted. Continue taking oral antiiotic as ordered, pending abdominal ultrasound, scheduled for 8/13/2019.     8/14/2019: F/U clinic visit with Dr. Horton. Admitted and discharged from emergency department this morning with abdominal pain, CT and ultrasound in filed. Dr. Horton performed a small drainage from the abdominal wound, moderate amount of creamy, serosanguineous fluid from abdominal wound present. Iodoform gauze packed into her wound, dressing changed as ordered. Wound  cultures collected and sent to lab/micro, pending results. Rx for Norco 5/325 mg one tab PO every 4 hours PRN as needed # 24 given to pt by Dr. Horton.      8/21/2019: Clinic visit with Dr. Horton. Wound dressing changed as ordered.      11/6/19: Follow up appt with Dr Horton. Wounds improving slowly. Continues to take antibiotics (Augmentin 875-125mg). No complaints voiced. Follow up in 1 week.  11/13/19:  F/U clinic visit with Dr. Horton.  Wound depth per Dr. Horton is 8.5 cm.  Continue PT Augmentin.  Patient is co left sided abdominal pain.  Abdominal US and labs ordered.  Wound care tolerated well.  Fu with Dr. Horton in 1 week.     11/20/19:Dr Horton assessed patient. Silver nitrate applied to wound. Continuing present plan of care. Patient is scheduled to have abdominal ultra sound Friday 11/22/19. Continues to take Augmentin.  F/U in 1 week.    11/27/19: Follow up in clinic with Dr. Horton. Wound depth 8.5cm, silver nitrate x 3 per Dr. Horton, patient continues taking po Augmentin, c/o left sided abdominal pain, ultrasound results discussed with patient, Dr. Horton will watch for now.  Follow up 1 week 12/4/19.     12/04/19: F/u Dr. Horton. Continue with current wound care treatment. Rx given for Bentyl 10 mg. Patient to follow up in  1 week at wound clinic    Review of Systems   Constitutional: Negative.    HENT: Negative.    Eyes: Negative.    Respiratory: Negative.    Cardiovascular: Negative.    Gastrointestinal: Negative.    Genitourinary: Negative.    Musculoskeletal: Negative.    Skin: Negative.    Neurological: Negative.    Psychiatric/Behavioral: Negative.        Objective:      Physical Exam   Constitutional: She is oriented to person, place, and time. She appears well-developed and well-nourished.   HENT:   Head: Normocephalic.   Eyes: Pupils are equal, round, and reactive to light. Conjunctivae and EOM are normal.   Neck: Normal range of motion. Neck supple.   Cardiovascular: Normal  rate, regular rhythm, normal heart sounds and intact distal pulses.   Pulmonary/Chest: Effort normal and breath sounds normal.   Abdominal: Soft. Bowel sounds are normal.   Musculoskeletal: Normal range of motion.   Neurological: She is alert and oriented to person, place, and time. She has normal reflexes.   Skin: Skin is warm and dry.       Assessment:       1. Diabetes with skin complication    2. Generalized abdominal pain    3. Colonic fistula    4. Abdominal wall abscess           Incision/Site 02/22/19 1146 Abdomen midline midline (Active)   02/22/19 1146    Present Prior to Hospital Arrival?:    Side:    Location: Abdomen   Orientation: midline   Incision Type: midline   Closure Method:    Additional Comments:    Removal Indication and Assessment:    Wound Outcome: Palliative   Removal Indications:    Dressing Appearance Intact;Moist drainage 12/4/2019  9:00 AM   Drainage Amount Moderate 12/4/2019  9:00 AM   Drainage Characteristics/Odor Serosanguineous 12/4/2019  9:00 AM   Appearance Pink;Red;Moist 12/4/2019  9:00 AM   Red (%), Wound Tissue Color 100 % 12/4/2019  9:00 AM   Periwound Area Intact 12/4/2019  9:00 AM   Wound Edges Defined 12/4/2019  9:00 AM   Wound Length (cm) 0.4 cm 12/4/2019  9:00 AM   Wound Width (cm) 0.4 cm 12/4/2019  9:00 AM   Wound Depth (cm) 8.6 cm 12/4/2019  9:00 AM   Wound Volume (cm^3) 1.38 cm^3 12/4/2019  9:00 AM   Wound Surface Area (cm^2) 0.16 cm^2 12/4/2019  9:00 AM   Care Cleansed with:;Sterile normal saline 12/4/2019  9:00 AM   Dressing Applied;Other (see comments);Gauze;Abd pad 12/4/2019  9:00 AM   Periwound Care Skin barrier film applied 12/4/2019  9:00 AM   Dressing Change Due 12/06/19 12/4/2019  9:00 AM               Clean wound with normal saline  Lidocaine 2% gel or 4 % Topical solution: PRN  Silver Nitrate x 1 per Dr. Horton  Chauncey wound:  Maintain dry chauncey wound, Cavilon under tape, betamethasone PRN itching  Primary dressing: Apply gentamycin to wound bed, pack wound  with Iodoform gauze   Secondary dressing: cover with 4x4 gauze, small ABD pad, secure with mefix tape.  Frequency: Mon, Wed, Fri    Home Health:  [facility]:  Home Health nurse to assess wound and perform wound care on Mon, Fri, and PRN. Orders routed to home health  Other orders: Continue po antibiotic Augmentin.  Rx given for Bentyl 10 mg      Plan:                Follow up in about 1 week (around 12/11/2019) for Dr. Horton.

## 2019-12-06 ENCOUNTER — TELEPHONE (OUTPATIENT)
Dept: HOME HEALTH SERVICES | Facility: HOSPITAL | Age: 69
End: 2019-12-06

## 2019-12-11 ENCOUNTER — HOSPITAL ENCOUNTER (OUTPATIENT)
Dept: WOUND CARE | Facility: HOSPITAL | Age: 69
Discharge: HOME | End: 2019-12-11
Attending: SURGERY
Payer: MEDICARE

## 2019-12-11 VITALS
HEART RATE: 75 BPM | BODY MASS INDEX: 30.91 KG/M2 | HEIGHT: 62 IN | DIASTOLIC BLOOD PRESSURE: 85 MMHG | WEIGHT: 168 LBS | TEMPERATURE: 98 F | SYSTOLIC BLOOD PRESSURE: 140 MMHG

## 2019-12-11 DIAGNOSIS — L08.9 LOCAL INFECTION OF SKIN AND SUBCUTANEOUS TISSUE: ICD-10-CM

## 2019-12-11 DIAGNOSIS — L02.211 ABDOMINAL WALL ABSCESS: Primary | ICD-10-CM

## 2019-12-11 DIAGNOSIS — R10.84 GENERALIZED ABDOMINAL PAIN: ICD-10-CM

## 2019-12-11 DIAGNOSIS — Z98.890 S/P EXPLORATORY LAPAROTOMY: ICD-10-CM

## 2019-12-11 DIAGNOSIS — E11.620 TYPE 2 DIABETES MELLITUS WITH DIABETIC DERMATITIS, WITHOUT LONG-TERM CURRENT USE OF INSULIN: ICD-10-CM

## 2019-12-11 DIAGNOSIS — K63.2 COLONIC FISTULA: ICD-10-CM

## 2019-12-11 PROCEDURE — 99213 OFFICE O/P EST LOW 20 MIN: CPT

## 2019-12-11 NOTE — PROGRESS NOTES
"Subjective:       Patient ID: Azucena Morrison is a 69 y.o. female.    Chief Complaint: Non-healing Wound Follow Up    Chief Complaint: Wound Care     6/29/17: Pt re-admit for distal abdominal wound. Pt denies any fevers or chills but states she feels nauseous at times. Returned to [place] June 28, from [place], reports much trouble with abdominal wound while in [place].  7/6/17-- Patient scheduled for surgical drainage of wound Tuesday 7/11/17DB  7/19/17-- Patient post op clinic visit.  8/16/17: CT of abdomen and pelvis done 8/9/17 due to suspected fistula  8/23/17-- U/S of abdomen done today.  12/13/17 Wound vac with 20cc drainage in clinic today.  1/10/18: on PO abx  1/24/18: Fistulagram ordered per Dr. Horton. Patient still on PO abx  02/21/18 Patient is not on antibiotics at this time. BS fasting 135 per patient report this am.  03/07/18 Culture of Anterior Abdominal Wound is positive. No antibiotics at this time.  fasting per patient report.    03/21/18 Patient c/o nausea along with abdominal tenderness near abd midline wound. Patient states that pain level is 6 and that she passed two sero-sanguineous clots from wound. Patient is afebrile this am.  3/28/18: patient continue antibiotics and reports that pain is less than last weeks clinic visit. Drainage has decreased as well  04/04/18 Patient states that abdominal pain is "pressure" sensation and that when she pushes down on her abdomen on either side of abdominal wound she feels like she has to urinate. Patient reports pain level of 3 this am.  fasting this am per patient report.   5/2/18: Patient had Abdomina Toribio today before wound care. She had discontinued Bactrim PO per Dr. Horton's recommendation and culture results and is now taking Amoxicillin PO  6/20/18: Pt reports itching to wound and periwound   6/27/18: patient reports no new complaints with regards to her wound or abdomen, wound continues to decrease in size and drainage  8/1/18: Pt " reports increased pain to abdomen with nausea and emesis since thursday 7/26/18, denies any fevers, patient did no go to ER as instructed by home health nurse on Sat. 7/28/18.  8/29/18: Patient admitted to hospital 8/2 for abdominal wound complications. Surgery for abdominal abscess per Dr. Horton on 8/3. Patient placed on IV antibiotics and discharged home on 8/18 with [facility]  and PICC line to LUE. Patient currently on IV ertapenem. IV medication sent to patients home per [facility].   9/5/18: IV Ertapenem to continue 1 week. Culture sent to lab. [facility] sent orders. PICC line intact to E.  9/12/18 Antibiotics completed. Culture results pending. 1 deep stitch remaining.  9/12/18: Culture positive for E. Coli, patient to continue Ertapenem IV antibiotics x 2weeks. [facility] notified  9/26/18: F/U abdominal wound, wound continues to improve. Patient will complete IV antibiotics today  10/3/18: patient c/o diarrhea for 2 days and nausea with some vomiting. Labs and stool culture ordered. IV antibiotics discontinued today  10/10/2018 patient will start on IV antibiotic Invaz IV once a day, pending PICC line placement, order placed today  for PICC per Dr. Horton.  10/24/18: patient on IV antibiotics, tolerating well. Continue for 1 week  11/7/2018: IV Antibiotic discontinued.  11/8/2018: PICC line to left upper arm discontinued by Home Health.  11/21/2018 F/u for abdominal wall fistula , c/o nausea  No vomiting , no fever  12/5/2018: F/u for abdominal wall fisula, c/o gas, Dr. Horton will order Bentyl. Surgery schedule for January 2019.  12/12/2018: F/u for abdominal wall fistula, c/o abdominal pain. Dr. Horton ordered Norco 5/325mg tab 1 PO every 4 hours as needed for pain and referred patient for x-ray of abdomen after clinic today.  12/19/2018: F/u for abdominal wall fisula, c/o nausea, Dr. Horton re ordered Ondansetron (Zofran) 8mg one tab by mouth every eight hours as needed for  nausea. No changes in wound condition from last visit noted in clinic today.  12/26/2018: F/u abdominal wall fistula drainage, no c/o at this time. Denies any abdominal pain or nausea. Dr. Horton recommends surgery first week of January 2019 for abdominal fistula treatment and possible colostomy placement, patient agree.  01/02/2019: F/u abdominal wall fistula. Dressing with large amount of drainage, malodorous, Dr. Horton is scheduling surgery for third week of January, 2019.  1/16/19: F/U Dr. Horton today in clinic, surgery scheduled for next Friday 1/25/19 with Dr. Horton.     01/23/2019: Presents to wound care clinic for f/u abdominal wound care tx. Surgery scheduled with Dr. Horton for Friday 01/25/2019. Dr. Horton explained Mrs. Morrison surgery procedure including possible complications, Nurse  (Z[place]ian) present, patient verbalizes understanding of procedure including possible complications, all questions from patient were answered by Dr. Horton including blood sugar and blood pressure medications per patient's concerns. Consent for surgery and blood transfusion signed by patient, Dr. Horton and nurse witnessed.  Abdominal wound cultures collected per Dr. Horton, cultures sent to lab/micro pending results. Dr. Horton prescribed the following orders: fleet enema for Thursday 1/24/2019, clear liquid diet and not to eat after midnight all for 1/24/2019. Start taking Neomycin and erythromycin by mouth three times a day (1/24/2019) and dulcolax one tab by mouth twice a day, Mrs. Morrison voices understanding.     01/30/2019: F/U wound care treatment with Dr. Horton. Per pt, primary physician Dr. Pj Monae ordered for her to take potassium pills for three days, last day today and scheduled for lab work at primary physician clinic on 1/31/2019. Per pt. Feeling better, no more abdominal pain (went to ER 1/24/2019 and discharged 1/25/2019 c/o abd pain.). Dr. Horton awaiting on primary clearance  "to re-schedule surgery, per pt. She will call wound care clinic and Dr. Horton to make aware of clearance day. Continue with same orders for dressing change for Dr. Horton. Mrs. Morrison requesting gentamicin refill.     02/06/2019: F/u with Dr. Horton for wound care treatment. Mrs. Morrison brought to clinic a clearance for surgery by Dr. Pj Sanches dated 02/06/2019 "Hyperkalemia-Resolved K 4.8 2/1/2019, labs , Cr. 1.05, H/H 10/30. No contraindication to surgery, tight control of BS, Hydration." A copy of EKG (1/24/2019) and lab work from [facility] collected 1/31/2019, reported results 2/1/2019. Dr. Horton scheduled surgery for 02/22/2019, consent were signed on 01/23/2019, all questions were answered by Dr. Horton, this nurse  (Filipino) present. Education provided to patient on the importance of having a clear liquid diet the day before surgery 02/21/2019 as per Dr. Horton, new medications and preop preparation before surgery, verbalizes understanding. Dr. Horton ordered Norco 5/325 mg PO for pain, Dulcolax two tabs by mouth to take on 02/21/2019, Soap suds enema (SSE) on Thursday 02/21/2019, Golytely by mouth 2 liters on 2/21/2019, Erythromycin 500 mg one tab by mouth three times a day and Neomycin 1 gm by mouth three times a day.     2/13/2019: Presents to wound care clinic for a f/u with Dr. Horton for wound care treatment. No new orders in wound dressing change, reviewed preop orders with Mrs. Morrison per Dr. Horton, all questions answered. Surgery scheduled for 02/22/2019.  2/20/19: Continues wound care a previously ordered.  Depth measured per Dr. Horton 8cm.  Preop orders reviewed with patient who verbalized understanding.  Surgery scheduled for Fri 2/22/19.  Rx given to patient for Norco and Zofran.    03/06/19: Patient admitted to [facility] on 02/22/19 for exploratory laparotomy of abdomen per Dr. Horton. Patient discharged on 02/28/19 with home health and PICC " "line with IV antibiotics. Staples removed today in clinic. Patient denies any fever, chills, n&v; however, she states that she has "low energy." Continued with wound care as ordered.     3/13/2019: F/U with Dr. Horton for wound care treatment. Continue with same wound dressing change orders as per Dr. Horton, orders followed. Home Health to continue wound dressing change and lab draw for CBC weekly; continue IV Ertapenem/Invaz 1 gm IV daily as ordered.     3/20/2019: Clinic visit with Dr. Horton for abdominal abscess treatment. Presents with moderate draining from abdominal wound; wound size decreasing per measurement. Per Dr. Horton, apply one-piece system ostomy bag to abdominal wound for drain collection, may drain bag when it fills and may change every other day, continue applying gentamicin to wound bed before applying ostomy bag. Continue IV Invaz until completion. If ostomy bag not effective for draining collection, may return to previous orders for wound dressing change. Orders followed. Education provided to Mrs. Morrison on handwashing and ostomy care techniques, voices and demonstrates understanding.   3/27/19: Follow up with Dr. Horton today in clinic for abdominal abscess, moderated tanish yellow drainge present on dressing.  Wound slowly improving, patient refused one-piece ostomy bag stated " no it's too messy." requested to return to previous dressing prior to ostomy bag placement- iodoform gauze, aquacel extra, sm abd, and mefix tape.  Silver nitrate x1 per Dr. Horton today in clinic. Rx given to patient for PO Zofran.     04/03/2019: F/u clinic visit with Dr. Horton. Abdominal wound improving in size, picture on file. Wound cultures from abdominal abscess collected and sent to lab/micro, pending results. Mrs. Morrison states going out of the country ([place]) at the end of April and plans to stay there for approximately two months. Dr. Horton ordered IV antibiotic for two more weeks and " new wound care dressing, orders followed.     4/9/2019: Clinic visit with Dr. Horton.  Per Dr. Horton, wound deep measurement increased, draining present. Wound cultures from abdominal wound taken and sent to lab/micro, pending results. C/o abdominal pain and itching around wound, Rx for betamethasone cream 01.% and Norco 5/325mg give in clinic by Dr. Horton. Wound care dressing orders followed. Continue IV antibiotic as previously ordered.  04/17/19: F/u for non-healing wound to abdomen. Culture report negative. Dr. Horton ordered 1 more week of IV antibiotics. Continue with topical wound care as ordered.     4/24/2019: Clinic visit with Dr. Horton for abdominal wound treatment. Wound improvement present, pictures on file. Last IV antibiotic today 4/24/2019, [facility] to discontinue PICC line from left upper arm on 4/25/2019. Per Lyubov Prince, going to [place] on Friday 4/26/2019 and out of the country ([place]) on Monday 4/29/2019. This nurse spoke to Shaunna at [employer] ([employer] antibiotic delivery company) and informed last dose of antibiotic is today. Dr. Horton ordered Hydrocodone-acetaminophen (Norco) 5-325 mg (32 tabs), Bentyl 10 mg (120 capsules) and Gentamycin ointment. Education provided on the importance of eating food that contains iron (to prevent anemia) as well as eating meat and taking her blood pressure medications (blood pressure medication) on time, voices understanding. All questions answered by Dr. Horton. [facility] outpatient confirmed Betamethasone cream ordered on 4/10/2019 is ready for . Instructions and education provided to Mrs. Morrison on abdominal wound dressing changes, hand washing techniques and medication use, effects and side effects, voices and demonstrates understanding. Discharged home on stable conditions, [facility] will give wound care clinic a call when she is back from [place] in few months from now.     7/17/19: Readmit today to wound care clinic.   Patient was recently out of the country visiting family in [place].  Patient complains of pain and nausea as on prior visits.  Dr. Horton seen patient today discussed with patient again placing colostomy patient refused.  Culture of wound taken, Dr. Horton restarted patient on zofran po for nausea, and Norco for pain. [facility] restarted today in clinic on Mondays and Fridays and prn. Orders given to gently pack wound with aquacel ag rope, cover with aquacel extra, 4x4 gauze, small abd pad and mefix tape change dressing ever other day by [facility] and Prn per patient.     7/24/2019: Clinic visit with Dr. Horton. Continue with dressing change as ordered. Wound cultures reviewed with patient, lab work ordered by Dr. Horton, no fever present or reported at this time. C/o decrease appetite, medication periactin prescribed. Requesting needles for insulin pen, order prescribed by Dr. Horton.      7/31/2019: F/U clinic visit with Dr. Horton. C/o of excruciated back pain 10/10, not feeling well, lot of gas and left upper quadrant discomfort. New orders prescribed: Augmentin 875-125 mg by mouth twice a day. Bentyl 10 mg one tab by mouth 4 times a day. Tramadol 50 mg one tab by mouth every 6 hours as needed for pain. Abdomen ultrasound. Wound care dressing completed as ordered.     8/7/2019: Clinic visit with Dr. Horton, denies any pain at this time. Wound care dressing done as ordered, no changes in wound size from last visit noted. Continue taking oral antiiotic as ordered, pending abdominal ultrasound, scheduled for 8/13/2019.     8/14/2019: F/U clinic visit with Dr. Horton. Admitted and discharged from emergency department this morning with abdominal pain, CT and ultrasound in filed. Dr. Horton performed a small drainage from the abdominal wound, moderate amount of creamy, serosanguineous fluid from abdominal wound present. Iodoform gauze packed into her wound, dressing changed as ordered. Wound  cultures collected and sent to lab/micro, pending results. Rx for Norco 5/325 mg one tab PO every 4 hours PRN as needed # 24 given to pt by Dr. Horton.      8/21/2019: Clinic visit with Dr. Horton. Wound dressing changed as ordered.      11/6/19: Follow up appt with Dr Horton. Wounds improving slowly. Continues to take antibiotics (Augmentin 875-125mg). No complaints voiced. Follow up in 1 week.  11/13/19:  F/U clinic visit with Dr. Horton.  Wound depth per Dr. Horton is 8.5 cm.  Continue PT Augmentin.  Patient is co left sided abdominal pain.  Abdominal US and labs ordered.  Wound care tolerated well.  Fu with Dr. Horton in 1 week.     11/20/19:Dr Horton assessed patient. Silver nitrate applied to wound. Continuing present plan of care. Patient is scheduled to have abdominal ultra sound Friday 11/22/19. Continues to take Augmentin.  F/U in 1 week.    11/27/19: Follow up in clinic with Dr. Horton. Wound depth 8.5cm, silver nitrate x 3 per Dr. Horton, patient continues taking po Augmentin, c/o left sided abdominal pain, ultrasound results discussed with patient, Dr. Horton will watch for now.  Follow up 1 week 12/4/19.     12/04/19: F/u Dr. Horton. Continue with current wound care treatment. Rx given for Bentyl 10 mg. Patient to follow up in  1 week at wound clinic  12/11/19: F/u with . Continue with current wound care treatment. Rx given for Lotrisone cream to be applied daily per patient to right lower leg rash. Patient to follow up in  1 week at wound clinic    Review of Systems   Constitutional: Negative.    HENT: Negative.    Eyes: Negative.    Respiratory: Negative.    Cardiovascular: Negative.    Gastrointestinal: Negative.    Genitourinary: Negative.    Musculoskeletal: Negative.    Skin: Negative.    Neurological: Negative.    Psychiatric/Behavioral: Negative.        Objective:      Physical Exam   Constitutional: She is oriented to person, place, and time. She appears well-developed and  well-nourished.   HENT:   Head: Normocephalic.   Eyes: Pupils are equal, round, and reactive to light. Conjunctivae and EOM are normal.   Neck: Normal range of motion. Neck supple.   Cardiovascular: Normal rate, regular rhythm, normal heart sounds and intact distal pulses.   Pulmonary/Chest: Effort normal and breath sounds normal.   Abdominal: Soft. Bowel sounds are normal.   Musculoskeletal: Normal range of motion.   Neurological: She is alert and oriented to person, place, and time. She has normal reflexes.   Skin: Skin is warm and dry.       Assessment:       No diagnosis found.       Incision/Site 02/22/19 1146 Abdomen midline midline (Active)   02/22/19 1146    Present Prior to Hospital Arrival?:    Side:    Location: Abdomen   Orientation: midline   Incision Type: midline   Closure Method:    Additional Comments:    Removal Indication and Assessment:    Wound Outcome: Palliative   Removal Indications:    Wound Image   12/11/2019  8:59 AM   Dressing Appearance Dry;Moist drainage 12/11/2019  8:59 AM   Drainage Amount Moderate 12/11/2019  8:59 AM   Drainage Characteristics/Odor Creamy;Serosanguineous 12/11/2019  8:59 AM   Appearance Red;Pink;Moist 12/11/2019  8:59 AM   Red (%), Wound Tissue Color 100 % 12/11/2019  8:59 AM   Periwound Area Intact 12/11/2019  8:59 AM   Wound Edges Defined 12/11/2019  8:59 AM   Wound Length (cm) 0.4 cm 12/11/2019  8:59 AM   Wound Width (cm) 0.3 cm 12/11/2019  8:59 AM   Wound Depth (cm) 8.6 cm 12/11/2019  8:59 AM   Wound Volume (cm^3) 1.03 cm^3 12/11/2019  8:59 AM   Wound Surface Area (cm^2) 0.12 cm^2 12/11/2019  8:59 AM   Care Cleansed with:;Sterile normal saline 12/11/2019  8:59 AM   Dressing Applied;Changed;Gauze;Abd pad;Other (see comments) 12/11/2019  8:59 AM   Packing Incision packed with;gauze, iodoform 12/11/2019  8:59 AM   Periwound Care Absorptive dressing applied;Skin barrier film applied 12/11/2019  8:59 AM   Dressing Change Due 12/13/19 12/11/2019  8:59 AM       Clean  wound with normal saline  Lidocaine 2% gel or 4 % Topical solution: PRN  Silver Nitrate x 1 per Dr. Horton  Chauncey wound:  Maintain dry chauncey wound, Cavilon under tape, betamethasone PRN itching  Primary dressing: Apply gentamycin to wound bed, pack wound with Iodoform gauze   Secondary dressing: cover with 4x4 gauze, small ABD pad, secure with mefix tape.  Frequency: Mon, Wed, Fri    Apply Lotrisone cream daily per patient to right lower leg.     Follow up with Dr. Horton in 1 week on Wed 12/18/19    Home Health:  [facility]:  Home Health nurse to assess wound and perform wound care on Mon, Fri, and PRN  Other orders: Continue po antibiotic Augmentin.  Rx given to patient for Lotrisone cream    Plan:                12/18/19: Dr. Horton

## 2019-12-18 ENCOUNTER — HOSPITAL ENCOUNTER (OUTPATIENT)
Dept: WOUND CARE | Facility: HOSPITAL | Age: 69
Discharge: HOME | End: 2019-12-18
Attending: SURGERY
Payer: MEDICARE

## 2019-12-18 VITALS — TEMPERATURE: 99 F | DIASTOLIC BLOOD PRESSURE: 63 MMHG | HEART RATE: 85 BPM | SYSTOLIC BLOOD PRESSURE: 135 MMHG

## 2019-12-18 DIAGNOSIS — L02.211 ABSCESS OF ABDOMINAL WALL: ICD-10-CM

## 2019-12-18 DIAGNOSIS — Z79.4 TYPE 2 DIABETES MELLITUS WITH OTHER SKIN ULCER, WITH LONG-TERM CURRENT USE OF INSULIN: ICD-10-CM

## 2019-12-18 DIAGNOSIS — Z98.890 S/P EXPLORATORY LAPAROTOMY: ICD-10-CM

## 2019-12-18 DIAGNOSIS — T14.8XXA DRAINAGE FROM WOUND: ICD-10-CM

## 2019-12-18 DIAGNOSIS — L02.211 ABDOMINAL WALL ABSCESS: ICD-10-CM

## 2019-12-18 DIAGNOSIS — E11.9 TYPE 2 DIABETES MELLITUS WITHOUT COMPLICATION, WITHOUT LONG-TERM CURRENT USE OF INSULIN: ICD-10-CM

## 2019-12-18 DIAGNOSIS — Z93.3 STATUS POST HARTMANN'S PROCEDURE: ICD-10-CM

## 2019-12-18 DIAGNOSIS — E11.620 TYPE 2 DIABETES MELLITUS WITH DIABETIC DERMATITIS, WITHOUT LONG-TERM CURRENT USE OF INSULIN: ICD-10-CM

## 2019-12-18 DIAGNOSIS — R10.84 GENERALIZED ABDOMINAL PAIN: ICD-10-CM

## 2019-12-18 DIAGNOSIS — I10 ESSENTIAL HYPERTENSION: ICD-10-CM

## 2019-12-18 DIAGNOSIS — R10.32 LLQ PAIN: ICD-10-CM

## 2019-12-18 DIAGNOSIS — Z90.49 HISTORY OF HEMICOLECTOMY: ICD-10-CM

## 2019-12-18 DIAGNOSIS — E11.622 TYPE 2 DIABETES MELLITUS WITH OTHER SKIN ULCER, WITH LONG-TERM CURRENT USE OF INSULIN: ICD-10-CM

## 2019-12-18 DIAGNOSIS — Z93.3 S/P COLOSTOMY: Primary | ICD-10-CM

## 2019-12-18 DIAGNOSIS — E11.628 DIABETES WITH SKIN COMPLICATION: ICD-10-CM

## 2019-12-18 PROCEDURE — 99213 OFFICE O/P EST LOW 20 MIN: CPT

## 2019-12-18 RX ORDER — AMOXICILLIN AND CLAVULANATE POTASSIUM 875; 125 MG/1; MG/1
1 TABLET, FILM COATED ORAL 2 TIMES DAILY
Qty: 60 TABLET | Refills: 1 | Status: ON HOLD | OUTPATIENT
Start: 2019-12-18 | End: 2020-02-02 | Stop reason: HOSPADM

## 2019-12-18 RX ORDER — ONDANSETRON HYDROCHLORIDE 8 MG/1
8 TABLET, FILM COATED ORAL EVERY 8 HOURS PRN
Qty: 40 TABLET | Refills: 1 | Status: ON HOLD | OUTPATIENT
Start: 2019-12-18 | End: 2019-12-24 | Stop reason: HOSPADM

## 2019-12-18 NOTE — PROGRESS NOTES
"Subjective:       Patient ID: Azucena Morrison is a 69 y.o. female.    Chief Complaint: Non-healing Wound    6/29/17: Pt re-admit for distal abdominal wound. Pt denies any fevers or chills but states she feels nauseous at times. Returned to [place] June 28, from [place], reports much trouble with abdominal wound while in [place].  7/6/17-- Patient scheduled for surgical drainage of wound Tuesday 7/11/17DB  7/19/17-- Patient post op clinic visit.  8/16/17: CT of abdomen and pelvis done 8/9/17 due to suspected fistula  8/23/17-- U/S of abdomen done today.  12/13/17 Wound vac with 20cc drainage in clinic today.  1/10/18: on PO abx  1/24/18: Fistulagram ordered per Dr. Horton. Patient still on PO abx  02/21/18 Patient is not on antibiotics at this time. BS fasting 135 per patient report this am.  03/07/18 Culture of Anterior Abdominal Wound is positive. No antibiotics at this time.  fasting per patient report.    03/21/18 Patient c/o nausea along with abdominal tenderness near abd midline wound. Patient states that pain level is 6 and that she passed two sero-sanguineous clots from wound. Patient is afebrile this am.  3/28/18: patient continue antibiotics and reports that pain is less than last weeks clinic visit. Drainage has decreased as well  04/04/18 Patient states that abdominal pain is "pressure" sensation and that when she pushes down on her abdomen on either side of abdominal wound she feels like she has to urinate. Patient reports pain level of 3 this am.  fasting this am per patient report.   5/2/18: Patient had Abdomina Toribio today before wound care. She had discontinued Bactrim PO per Dr. Horton's recommendation and culture results and is now taking Amoxicillin PO  6/20/18: Pt reports itching to wound and periwound   6/27/18: patient reports no new complaints with regards to her wound or abdomen, wound continues to decrease in size and drainage  8/1/18: Pt reports increased pain to abdomen with " nausea and emesis since thursday 7/26/18, denies any fevers, patient did no go to ER as instructed by home health nurse on Sat. 7/28/18.  8/29/18: Patient admitted to hospital 8/2 for abdominal wound complications. Surgery for abdominal abscess per Dr. Horton on 8/3. Patient placed on IV antibiotics and discharged home on 8/18 with [facility]  and PICC line to LUE. Patient currently on IV ertapenem. IV medication sent to patients home per [facility].   9/5/18: IV Ertapenem to continue 1 week. Culture sent to lab. [facility]  sent orders. PICC line intact to E.  9/12/18 Antibiotics completed. Culture results pending. 1 deep stitch remaining.  9/12/18: Culture positive for E. Coli, patient to continue Ertapenem IV antibiotics x 2weeks. [facility] notified  9/26/18: F/U abdominal wound, wound continues to improve. Patient will complete IV antibiotics today  10/3/18: patient c/o diarrhea for 2 days and nausea with some vomiting. Labs and stool culture ordered. IV antibiotics discontinued today  10/10/2018 patient will start on IV antibiotic Invaz IV once a day, pending PICC line placement, order placed today  for PICC per Dr. Horton.  10/24/18: patient on IV antibiotics, tolerating well. Continue for 1 week  11/7/2018: IV Antibiotic discontinued.  11/8/2018: PICC line to left upper arm discontinued by Home Health.  11/21/2018 F/u for abdominal wall fistula , c/o nausea  No vomiting , no fever  12/5/2018: F/u for abdominal wall fisula, c/o gas, Dr. Horton will order Bentyl. Surgery schedule for January 2019.  12/12/2018: F/u for abdominal wall fistula, c/o abdominal pain. Dr. Horton ordered Norco 5/325mg tab 1 PO every 4 hours as needed for pain and referred patient for x-ray of abdomen after clinic today.  12/19/2018: F/u for abdominal wall fisula, c/o nausea, Dr. Horton re ordered Ondansetron (Zofran) 8mg one tab by mouth every eight hours as needed for nausea. No changes in wound condition from  last visit noted in clinic today.  12/26/2018: F/u abdominal wall fistula drainage, no c/o at this time. Denies any abdominal pain or nausea. Dr. Horton recommends surgery first week of January 2019 for abdominal fistula treatment and possible colostomy placement, patient agree.  01/02/2019: F/u abdominal wall fistula. Dressing with large amount of drainage, malodorous, Dr. Horton is scheduling surgery for third week of January, 2019.  1/16/19: F/U Dr. Horton today in clinic, surgery scheduled for next Friday 1/25/19 with Dr. Horton.     01/23/2019: Presents to wound care clinic for f/u abdominal wound care tx. Surgery scheduled with Dr. Horton for Friday 01/25/2019. Dr. Horton explained Mrs. Morrison surgery procedure including possible complications, Nurse  (Slovenian) present, patient verbalizes understanding of procedure including possible complications, all questions from patient were answered by Dr. Horton including blood sugar and blood pressure medications per patient's concerns. Consent for surgery and blood transfusion signed by patient, Dr. Horton and nurse witnessed.  Abdominal wound cultures collected per Dr. Horton, cultures sent to lab/micro pending results. Dr. Horton prescribed the following orders: fleet enema for Thursday 1/24/2019, clear liquid diet and not to eat after midnight all for 1/24/2019. Start taking Neomycin and erythromycin by mouth three times a day (1/24/2019) and dulcolax one tab by mouth twice a day, Mrs. Morrison voices understanding.     01/30/2019: F/U wound care treatment with Dr. Horton. Per pt, primary physician Dr. Pj Monae ordered for her to take potassium pills for three days, last day today and scheduled for lab work at primary physician clinic on 1/31/2019. Per pt. Feeling better, no more abdominal pain (went to ER 1/24/2019 and discharged 1/25/2019 c/o abd pain.). Dr. Horton awaiting on primary clearance to re-schedule surgery, per pt. She will  "call wound care clinic and Dr. Horton to make aware of clearance day. Continue with same orders for dressing change for Dr. Horton. Mrs. Morrison requesting gentamicin refill.     02/06/2019: F/u with Dr. Horton for wound care treatment. Mrs. Morrison brought to clinic a clearance for surgery by Dr. Pj Sanches dated 02/06/2019 "Hyperkalemia-Resolved K 4.8 2/1/2019, labs , Cr. 1.05, H/H 10/30. No contraindication to surgery, tight control of BS, Hydration." A copy of EKG (1/24/2019) and lab work from [facility] collected 1/31/2019, reported results 2/1/2019. Dr. Horton scheduled surgery for 02/22/2019, consent were signed on 01/23/2019, all questions were answered by Dr. Horton, this nurse  (Swedish) present. Education provided to patient on the importance of having a clear liquid diet the day before surgery 02/21/2019 as per Dr. Horton, new medications and preop preparation before surgery, verbalizes understanding. Dr. Horton ordered Norco 5/325 mg PO for pain, Dulcolax two tabs by mouth to take on 02/21/2019, Soap suds enema (SSE) on Thursday 02/21/2019, Golytely by mouth 2 liters on 2/21/2019, Erythromycin 500 mg one tab by mouth three times a day and Neomycin 1 gm by mouth three times a day.     2/13/2019: Presents to wound care clinic for a f/u with Dr. Horton for wound care treatment. No new orders in wound dressing change, reviewed preop orders with Mrs. Morrison per Dr. Horton, all questions answered. Surgery scheduled for 02/22/2019.  2/20/19: Continues wound care a previously ordered.  Depth measured per Dr. Horton 8cm.  Preop orders reviewed with patient who verbalized understanding.  Surgery scheduled for Fri 2/22/19.  Rx given to patient for Norco and Zofran.    03/06/19: Patient admitted to [facility] on 02/22/19 for exploratory laparotomy of abdomen per Dr. Horton. Patient discharged on 02/28/19 with home health and PICC line with IV antibiotics. Staples removed " "today in clinic. Patient denies any fever, chills, n&v; however, she states that she has "low energy." Continued with wound care as ordered.     3/13/2019: F/U with Dr. Horton for wound care treatment. Continue with same wound dressing change orders as per Dr. Horton, orders followed. Home Health to continue wound dressing change and lab draw for CBC weekly; continue IV Ertapenem/Invaz 1 gm IV daily as ordered.     3/20/2019: Clinic visit with Dr. Horton for abdominal abscess treatment. Presents with moderate draining from abdominal wound; wound size decreasing per measurement. Per Dr. Horton, apply one-piece system ostomy bag to abdominal wound for drain collection, may drain bag when it fills and may change every other day, continue applying gentamicin to wound bed before applying ostomy bag. Continue IV Invaz until completion. If ostomy bag not effective for draining collection, may return to previous orders for wound dressing change. Orders followed. Education provided to Mrs. Morrison on handwashing and ostomy care techniques, voices and demonstrates understanding.   3/27/19: Follow up with Dr. Horton today in clinic for abdominal abscess, moderated tanish yellow drainge present on dressing.  Wound slowly improving, patient refused one-piece ostomy bag stated " no it's too messy." requested to return to previous dressing prior to ostomy bag placement- iodoform gauze, aquacel extra, sm abd, and mefix tape.  Silver nitrate x1 per Dr. Horton today in clinic. Rx given to patient for PO Zofran.     04/03/2019: F/u clinic visit with Dr. Horton. Abdominal wound improving in size, picture on file. Wound cultures from abdominal abscess collected and sent to lab/micro, pending results. Mrs. Morrison states going out of the country ([place]) at the end of April and plans to stay there for approximately two months. Dr. Horton ordered IV antibiotic for two more weeks and new wound care dressing, orders " followed.     4/9/2019: Clinic visit with Dr. Horton.  Per Dr. Horton, wound deep measurement increased, draining present. Wound cultures from abdominal wound taken and sent to lab/micro, pending results. C/o abdominal pain and itching around wound, Rx for betamethasone cream 01.% and Norco 5/325mg give in clinic by Dr. Horton. Wound care dressing orders followed. Continue IV antibiotic as previously ordered.  04/17/19: F/u for non-healing wound to abdomen. Culture report negative. Dr. Horton ordered 1 more week of IV antibiotics. Continue with topical wound care as ordered.     4/24/2019: Clinic visit with Dr. Horton for abdominal wound treatment. Wound improvement present, pictures on file. Last IV antibiotic today 4/24/2019, [facility] to discontinue PICC line from left upper arm on 4/25/2019. Per Lyubov Prince, going to [place] on Friday 4/26/2019 and out of the country ([place]) on Monday 4/29/2019. This nurse spoke to Shaunna at [employer] ([employer] antibiotic delivery company) and informed last dose of antibiotic is today. Dr. Horton ordered Hydrocodone-acetaminophen (Norco) 5-325 mg (32 tabs), Bentyl 10 mg (120 capsules) and Gentamycin ointment. Education provided on the importance of eating food that contains iron (to prevent anemia) as well as eating meat and taking her blood pressure medications (blood pressure medication) on time, voices understanding. All questions answered by Dr. Horton. [facility] outpatient confirmed Betamethasone cream ordered on 4/10/2019 is ready for . Instructions and education provided to Mrs. Morrison on abdominal wound dressing changes, hand washing techniques and medication use, effects and side effects, voices and demonstrates understanding. Discharged home on stable conditions, [facility] will give wound care clinic a call when she is back from [place] in few months from now.     7/17/19: Readmit today to wound care clinic.  Patient was recently out of the  country visiting family in [place].  Patient complains of pain and nausea as on prior visits.  Dr. Horton seen patient today discussed with patient again placing colostomy patient refused.  Culture of wound taken, Dr. Horton restarted patient on zofran po for nausea, and Norco for pain. [facility] restarted today in clinic on Mondays and Fridays and prn. Orders given to gently pack wound with aquacel ag rope, cover with aquacel extra, 4x4 gauze, small abd pad and mefix tape change dressing ever other day by [facility] and Prn per patient.     7/24/2019: Clinic visit with Dr. Horton. Continue with dressing change as ordered. Wound cultures reviewed with patient, lab work ordered by Dr. Horton, no fever present or reported at this time. C/o decrease appetite, medication periactin prescribed. Requesting needles for insulin pen, order prescribed by Dr. Horton.      7/31/2019: F/U clinic visit with Dr. Horton. C/o of excruciated back pain 10/10, not feeling well, lot of gas and left upper quadrant discomfort. New orders prescribed: Augmentin 875-125 mg by mouth twice a day. Bentyl 10 mg one tab by mouth 4 times a day. Tramadol 50 mg one tab by mouth every 6 hours as needed for pain. Abdomen ultrasound. Wound care dressing completed as ordered.     8/7/2019: Clinic visit with Dr. Horton, denies any pain at this time. Wound care dressing done as ordered, no changes in wound size from last visit noted. Continue taking oral antiiotic as ordered, pending abdominal ultrasound, scheduled for 8/13/2019.     8/14/2019: F/U clinic visit with Dr. Horton. Admitted and discharged from emergency department this morning with abdominal pain, CT and ultrasound in filed. Dr. Horton performed a small drainage from the abdominal wound, moderate amount of creamy, serosanguineous fluid from abdominal wound present. Iodoform gauze packed into her wound, dressing changed as ordered. Wound cultures collected and sent to  lab/micro, pending results. Rx for Norco 5/325 mg one tab PO every 4 hours PRN as needed # 24 given to pt by Dr. Horton.      8/21/2019: Clinic visit with Dr. Horton. Wound dressing changed as ordered.      11/6/19: Follow up appt with Dr Horton. Wounds improving slowly. Continues to take antibiotics (Augmentin 875-125mg). No complaints voiced. Follow up in 1 week.  11/13/19:  F/U clinic visit with Dr. Horton.  Wound depth per Dr. Horton is 8.5 cm.  Continue PT Augmentin.  Patient is co left sided abdominal pain.  Abdominal US and labs ordered.  Wound care tolerated well.  Fu with Dr. Horton in 1 week.     11/20/19:Dr Horton assessed patient. Silver nitrate applied to wound. Continuing present plan of care. Patient is scheduled to have abdominal ultra sound Friday 11/22/19. Continues to take Augmentin.  F/U in 1 week.    11/27/19: Follow up in clinic with Dr. Horton. Wound depth 8.5cm, silver nitrate x 3 per Dr. Horton, patient continues taking po Augmentin, c/o left sided abdominal pain, ultrasound results discussed with patient, Dr. Horton will watch for now.  Follow up 1 week 12/4/19.     12/04/19: F/u Dr. Horton. Continue with current wound care treatment. Rx given for Bentyl 10 mg. Patient to follow up in  1 week at wound clinic  12/11/19: F/u with . Continue with current wound care treatment. Rx given for Lotrisone cream to be applied daily per patient to right lower leg rash. Patient to follow up in  1 week at wound clinic    12/18/19: F/u with Dr. Horton. Patient c/o pain 9 out of 10  to abdomen LLQ. Area of pain is warm to touch, pink, and a small nodule can be felt. Patient states that symptoms began about 4 days ago. Stat CT scan ordered. Patient will have CT done tomorrow due to not fasting this morning. Instructed patient to fast before CT scan. Continue with current wound care treatment     Review of Systems   Constitutional: Negative.    HENT: Negative.    Eyes: Negative.     Respiratory: Negative.    Cardiovascular: Negative.    Gastrointestinal: Negative.    Genitourinary: Negative.    Musculoskeletal: Negative.    Skin: Negative.    Neurological: Negative.    Psychiatric/Behavioral: Negative.        Objective:      Physical Exam   Constitutional: She is oriented to person, place, and time. She appears well-developed and well-nourished.   HENT:   Head: Normocephalic.   Eyes: Pupils are equal, round, and reactive to light. Conjunctivae and EOM are normal.   Neck: Normal range of motion. Neck supple.   Cardiovascular: Normal rate, regular rhythm, normal heart sounds and intact distal pulses.   Pulmonary/Chest: Effort normal and breath sounds normal.   Abdominal: Soft. Bowel sounds are normal.   Musculoskeletal: Normal range of motion.   Neurological: She is alert and oriented to person, place, and time. She has normal reflexes.   Skin: Skin is warm and dry.       Assessment:       1. S/P colostomy    2. LLQ pain    3. Drainage from wound    4. 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 Abscess of abdominal wall    6. Generalized abdominal pain    7. History of hemicolectomy    8. Status post Aiyana's procedure    9. Type 2 diabetes mellitus without complication, without long-term current use of insulin    10. Essential hypertension    11. S/P exploratory laparotomy    12. Type 2 diabetes mellitus with diabetic dermatitis, without long-term current use of insulin    13. Diabetes with skin complication    14. Abdominal wall abscess           Incision/Site 02/22/19 1146 Abdomen midline midline (Active)   02/22/19 1146    Present Prior to Hospital Arrival?:    Side:    Location: Abdomen   Orientation: midline   Incision Type: midline   Closure Method:    Additional Comments:    Removal Indication and Assessment:    Wound Outcome: Palliative   Removal Indications:    Dressing Appearance Intact;Moist drainage 12/18/2019  9:00 AM   Drainage  Amount Moderate 12/18/2019  9:00 AM   Drainage Characteristics/Odor Creamy 12/18/2019  9:00 AM   Appearance Pink;Red;Moist 12/18/2019  9:00 AM   Red (%), Wound Tissue Color 100 % 12/18/2019  9:00 AM   Periwound Area Intact 12/18/2019  9:00 AM   Wound Edges Defined 12/18/2019  9:00 AM   Wound Length (cm) 0.4 cm 12/18/2019  9:00 AM   Wound Width (cm) 0.3 cm 12/18/2019  9:00 AM   Wound Depth (cm) 2 cm 12/18/2019  9:00 AM   Wound Volume (cm^3) 0.24 cm^3 12/18/2019  9:00 AM   Wound Surface Area (cm^2) 0.12 cm^2 12/18/2019  9:00 AM   Care Cleansed with:;Sterile normal saline 12/18/2019  9:00 AM   Dressing Applied;Gauze;Abd pad;Other (see comments) 12/18/2019  9:00 AM   Packing Incision packed with;gauze, iodoform 12/18/2019  9:00 AM   Periwound Care Absorptive dressing applied 12/18/2019  9:00 AM   Dressing Change Due 12/20/19 12/18/2019  9:00 AM       Clean wound with normal saline  Lidocaine 4 % Topical solution  Silver Nitrate x 1 per Dr. Horton  Chauncey wound:  Maintain dry chauncey wound, Cavilon under tape, betamethasone PRN itching  Primary dressing: Apply gentamycin to wound bed, pack wound with Iodoform gauze   Secondary dressing: cover with 4x4 gauze, small ABD pad, secure with mefix tape.  Frequency: Mon, Wed, Fri    Home Health:  [facility]:  Home Health nurse to assess wound and perform wound care on Mon, Fri, and PRN  Other orders: Continue po antibiotic Augmentin.  Rx given to patient for Po Augmentin and norco 7.5 mg  Order for CT scan of abdomen, patient scheduled to have done tomorrow on 12/19/19      Plan:                Follow up with Dr. Horton in 1 week on Tuesday 12/24/19

## 2019-12-19 ENCOUNTER — HOSPITAL ENCOUNTER (OUTPATIENT)
Dept: RADIOLOGY | Facility: HOSPITAL | Age: 69
Discharge: HOME | DRG: 856 | End: 2019-12-19
Attending: SURGERY
Payer: MEDICARE

## 2019-12-19 DIAGNOSIS — R10.32 LLQ PAIN: ICD-10-CM

## 2019-12-19 DIAGNOSIS — R10.84 GENERALIZED ABDOMINAL PAIN: ICD-10-CM

## 2019-12-19 LAB
CREAT SERPL-MCNC: 1 MG/DL (ref 0.5–1.4)
SAMPLE: NORMAL

## 2019-12-19 PROCEDURE — 25500020 PHARM REV CODE 255: Performed by: SURGERY

## 2019-12-19 PROCEDURE — 74178 CT ABDOMEN PELVIS W WO CONTRAST: ICD-10-PCS | Mod: 26,,, | Performed by: RADIOLOGY

## 2019-12-19 PROCEDURE — 74178 CT ABD&PLV WO CNTR FLWD CNTR: CPT | Mod: TC

## 2019-12-19 PROCEDURE — 74178 CT ABD&PLV WO CNTR FLWD CNTR: CPT | Mod: 26,,, | Performed by: RADIOLOGY

## 2019-12-19 RX ORDER — AMOXICILLIN AND CLAVULANATE POTASSIUM 875; 125 MG/1; MG/1
1 TABLET, FILM COATED ORAL 2 TIMES DAILY
Qty: 60 TABLET | Refills: 1 | Status: ON HOLD | OUTPATIENT
Start: 2019-12-19 | End: 2019-12-24 | Stop reason: HOSPADM

## 2019-12-19 RX ADMIN — IOHEXOL 75 ML: 350 INJECTION, SOLUTION INTRAVENOUS at 09:12

## 2019-12-19 RX ADMIN — IOHEXOL 30 ML: 350 INJECTION, SOLUTION INTRAVENOUS at 08:12

## 2019-12-20 ENCOUNTER — TELEPHONE (OUTPATIENT)
Dept: HOME HEALTH SERVICES | Facility: HOSPITAL | Age: 69
End: 2019-12-20

## 2019-12-22 ENCOUNTER — HOSPITAL ENCOUNTER (INPATIENT)
Facility: HOSPITAL | Age: 69
LOS: 2 days | Discharge: HOME HEALTH | DRG: 856 | End: 2019-12-24
Attending: EMERGENCY MEDICINE | Admitting: SURGERY
Payer: MEDICARE

## 2019-12-22 ENCOUNTER — ANESTHESIA (OUTPATIENT)
Dept: SURGERY | Facility: HOSPITAL | Age: 69
DRG: 856 | End: 2019-12-22
Payer: MEDICARE

## 2019-12-22 ENCOUNTER — ANESTHESIA EVENT (OUTPATIENT)
Dept: SURGERY | Facility: HOSPITAL | Age: 69
DRG: 856 | End: 2019-12-22
Payer: MEDICARE

## 2019-12-22 DIAGNOSIS — Z93.3 S/P COLOSTOMY: ICD-10-CM

## 2019-12-22 DIAGNOSIS — E11.9 TYPE 2 DIABETES MELLITUS WITHOUT COMPLICATION, WITHOUT LONG-TERM CURRENT USE OF INSULIN: ICD-10-CM

## 2019-12-22 DIAGNOSIS — A49.8 INFECTION DUE TO ESBL-PRODUCING ESCHERICHIA COLI: ICD-10-CM

## 2019-12-22 DIAGNOSIS — K63.2 COLONIC FISTULA: ICD-10-CM

## 2019-12-22 DIAGNOSIS — E11.620 TYPE 2 DIABETES MELLITUS WITH DIABETIC DERMATITIS, WITHOUT LONG-TERM CURRENT USE OF INSULIN: ICD-10-CM

## 2019-12-22 DIAGNOSIS — R10.32 LLQ PAIN: ICD-10-CM

## 2019-12-22 DIAGNOSIS — L02.211 ABSCESS OF ABDOMINAL WALL: ICD-10-CM

## 2019-12-22 DIAGNOSIS — I10 ESSENTIAL HYPERTENSION: ICD-10-CM

## 2019-12-22 DIAGNOSIS — E11.622 TYPE 2 DIABETES MELLITUS WITH OTHER SKIN ULCER, WITH LONG-TERM CURRENT USE OF INSULIN: ICD-10-CM

## 2019-12-22 DIAGNOSIS — L02.211 ABDOMINAL WALL ABSCESS: Primary | ICD-10-CM

## 2019-12-22 DIAGNOSIS — T81.89XD SUTURE GRANULOMA, SUBSEQUENT ENCOUNTER: ICD-10-CM

## 2019-12-22 DIAGNOSIS — K63.5 POLYP OF COLON, UNSPECIFIED PART OF COLON, UNSPECIFIED TYPE: ICD-10-CM

## 2019-12-22 DIAGNOSIS — Z16.12 INFECTION DUE TO ESBL-PRODUCING ESCHERICHIA COLI: ICD-10-CM

## 2019-12-22 DIAGNOSIS — K63.2 FISTULA OF INTESTINE, EXCLUDING RECTUM AND ANUS: ICD-10-CM

## 2019-12-22 DIAGNOSIS — K56.609 SMALL BOWEL OBSTRUCTION: ICD-10-CM

## 2019-12-22 DIAGNOSIS — Z93.3 STATUS POST HARTMANN'S PROCEDURE: ICD-10-CM

## 2019-12-22 DIAGNOSIS — T14.8XXA DRAINAGE FROM WOUND: ICD-10-CM

## 2019-12-22 DIAGNOSIS — R11.0 NAUSEA: ICD-10-CM

## 2019-12-22 DIAGNOSIS — T81.43XA POSTPROCEDURAL INTRAABDOMINAL ABSCESS: ICD-10-CM

## 2019-12-22 DIAGNOSIS — K63.2 COLOCUTANEOUS FISTULA: ICD-10-CM

## 2019-12-22 DIAGNOSIS — Z79.4 TYPE 2 DIABETES MELLITUS WITH OTHER SKIN ULCER, WITH LONG-TERM CURRENT USE OF INSULIN: ICD-10-CM

## 2019-12-22 DIAGNOSIS — Z90.49 HISTORY OF HEMICOLECTOMY: ICD-10-CM

## 2019-12-22 DIAGNOSIS — R10.84 GENERALIZED ABDOMINAL PAIN: ICD-10-CM

## 2019-12-22 PROBLEM — K65.1 POSTPROCEDURAL INTRAABDOMINAL ABSCESS: Status: ACTIVE | Noted: 2019-12-22

## 2019-12-22 LAB
ALBUMIN SERPL BCP-MCNC: 2.7 G/DL (ref 3.5–5.2)
ALP SERPL-CCNC: 100 U/L (ref 55–135)
ALT SERPL W/O P-5'-P-CCNC: 9 U/L (ref 10–44)
ANION GAP SERPL CALC-SCNC: 15 MMOL/L (ref 8–16)
AST SERPL-CCNC: 12 U/L (ref 10–40)
BACTERIA #/AREA URNS HPF: NORMAL /HPF
BASOPHILS # BLD AUTO: 0.01 K/UL (ref 0–0.2)
BASOPHILS NFR BLD: 0.1 % (ref 0–1.9)
BILIRUB SERPL-MCNC: 0.4 MG/DL (ref 0.1–1)
BILIRUB UR QL STRIP: NEGATIVE
BUN SERPL-MCNC: 14 MG/DL (ref 8–23)
CALCIUM SERPL-MCNC: 9.3 MG/DL (ref 8.7–10.5)
CHLORIDE SERPL-SCNC: 96 MMOL/L (ref 95–110)
CLARITY UR: CLEAR
CO2 SERPL-SCNC: 24 MMOL/L (ref 23–29)
COLOR UR: YELLOW
CREAT SERPL-MCNC: 1.2 MG/DL (ref 0.5–1.4)
DIFFERENTIAL METHOD: ABNORMAL
EOSINOPHIL # BLD AUTO: 0.2 K/UL (ref 0–0.5)
EOSINOPHIL NFR BLD: 1.9 % (ref 0–8)
ERYTHROCYTE [DISTWIDTH] IN BLOOD BY AUTOMATED COUNT: 15.3 % (ref 11.5–14.5)
EST. GFR  (AFRICAN AMERICAN): 54 ML/MIN/1.73 M^2
EST. GFR  (NON AFRICAN AMERICAN): 47 ML/MIN/1.73 M^2
ESTIMATED AVG GLUCOSE: 186 MG/DL (ref 68–131)
GLUCOSE SERPL-MCNC: 172 MG/DL (ref 70–110)
GLUCOSE UR QL STRIP: NEGATIVE
GRAM STN SPEC: NORMAL
GRAM STN SPEC: NORMAL
HBA1C MFR BLD HPLC: 8.1 % (ref 4–5.6)
HCT VFR BLD AUTO: 27.4 % (ref 37–48.5)
HGB BLD-MCNC: 8.5 G/DL (ref 12–16)
HGB UR QL STRIP: NEGATIVE
HYALINE CASTS #/AREA URNS LPF: 0 /LPF
KETONES UR QL STRIP: NEGATIVE
LACTATE SERPL-SCNC: 1.1 MMOL/L (ref 0.5–2.2)
LEUKOCYTE ESTERASE UR QL STRIP: NEGATIVE
LYMPHOCYTES # BLD AUTO: 1.4 K/UL (ref 1–4.8)
LYMPHOCYTES NFR BLD: 13.5 % (ref 18–48)
MCH RBC QN AUTO: 22.1 PG (ref 27–31)
MCHC RBC AUTO-ENTMCNC: 31 G/DL (ref 32–36)
MCV RBC AUTO: 71 FL (ref 82–98)
MICROSCOPIC COMMENT: NORMAL
MONOCYTES # BLD AUTO: 0.8 K/UL (ref 0.3–1)
MONOCYTES NFR BLD: 7.6 % (ref 4–15)
NEUTROPHILS # BLD AUTO: 7.9 K/UL (ref 1.8–7.7)
NEUTROPHILS NFR BLD: 76.9 % (ref 38–73)
NITRITE UR QL STRIP: NEGATIVE
PH UR STRIP: 8 [PH] (ref 5–8)
PLATELET # BLD AUTO: 503 K/UL (ref 150–350)
PMV BLD AUTO: 7.8 FL (ref 9.2–12.9)
POCT GLUCOSE: 161 MG/DL (ref 70–110)
POCT GLUCOSE: 207 MG/DL (ref 70–110)
POTASSIUM SERPL-SCNC: 3.8 MMOL/L (ref 3.5–5.1)
PROT SERPL-MCNC: 8.4 G/DL (ref 6–8.4)
PROT UR QL STRIP: ABNORMAL
RBC # BLD AUTO: 3.85 M/UL (ref 4–5.4)
RBC #/AREA URNS HPF: 0 /HPF (ref 0–4)
SODIUM SERPL-SCNC: 135 MMOL/L (ref 136–145)
SP GR UR STRIP: 1.01 (ref 1–1.03)
URN SPEC COLLECT METH UR: ABNORMAL
UROBILINOGEN UR STRIP-ACNC: NEGATIVE EU/DL
WBC # BLD AUTO: 10.26 K/UL (ref 3.9–12.7)
WBC #/AREA URNS HPF: 0 /HPF (ref 0–5)

## 2019-12-22 PROCEDURE — 36000704 HC OR TIME LEV I 1ST 15 MIN: Performed by: SURGERY

## 2019-12-22 PROCEDURE — 63600175 PHARM REV CODE 636 W HCPCS: Performed by: EMERGENCY MEDICINE

## 2019-12-22 PROCEDURE — 87102 FUNGUS ISOLATION CULTURE: CPT

## 2019-12-22 PROCEDURE — 87186 SC STD MICRODIL/AGAR DIL: CPT | Mod: 59

## 2019-12-22 PROCEDURE — 71000039 HC RECOVERY, EACH ADD'L HOUR: Performed by: SURGERY

## 2019-12-22 PROCEDURE — 94761 N-INVAS EAR/PLS OXIMETRY MLT: CPT

## 2019-12-22 PROCEDURE — 87015 SPECIMEN INFECT AGNT CONCNTJ: CPT

## 2019-12-22 PROCEDURE — 87075 CULTR BACTERIA EXCEPT BLOOD: CPT

## 2019-12-22 PROCEDURE — 87206 SMEAR FLUORESCENT/ACID STAI: CPT

## 2019-12-22 PROCEDURE — 85025 COMPLETE CBC W/AUTO DIFF WBC: CPT

## 2019-12-22 PROCEDURE — 25000003 PHARM REV CODE 250: Performed by: STUDENT IN AN ORGANIZED HEALTH CARE EDUCATION/TRAINING PROGRAM

## 2019-12-22 PROCEDURE — 71000033 HC RECOVERY, INTIAL HOUR: Performed by: SURGERY

## 2019-12-22 PROCEDURE — 87076 CULTURE ANAEROBE IDENT EACH: CPT

## 2019-12-22 PROCEDURE — 83036 HEMOGLOBIN GLYCOSYLATED A1C: CPT

## 2019-12-22 PROCEDURE — 87040 BLOOD CULTURE FOR BACTERIA: CPT | Mod: 59

## 2019-12-22 PROCEDURE — 93005 ELECTROCARDIOGRAM TRACING: CPT

## 2019-12-22 PROCEDURE — 87205 SMEAR GRAM STAIN: CPT

## 2019-12-22 PROCEDURE — 87070 CULTURE OTHR SPECIMN AEROBIC: CPT

## 2019-12-22 PROCEDURE — 93010 EKG 12-LEAD: ICD-10-PCS | Mod: ,,, | Performed by: INTERNAL MEDICINE

## 2019-12-22 PROCEDURE — 36415 COLL VENOUS BLD VENIPUNCTURE: CPT

## 2019-12-22 PROCEDURE — 11000001 HC ACUTE MED/SURG PRIVATE ROOM

## 2019-12-22 PROCEDURE — 27000221 HC OXYGEN, UP TO 24 HOURS

## 2019-12-22 PROCEDURE — 96361 HYDRATE IV INFUSION ADD-ON: CPT

## 2019-12-22 PROCEDURE — 81000 URINALYSIS NONAUTO W/SCOPE: CPT

## 2019-12-22 PROCEDURE — 87116 MYCOBACTERIA CULTURE: CPT

## 2019-12-22 PROCEDURE — 80053 COMPREHEN METABOLIC PANEL: CPT

## 2019-12-22 PROCEDURE — 96375 TX/PRO/DX INJ NEW DRUG ADDON: CPT

## 2019-12-22 PROCEDURE — 37000009 HC ANESTHESIA EA ADD 15 MINS: Performed by: SURGERY

## 2019-12-22 PROCEDURE — 96365 THER/PROPH/DIAG IV INF INIT: CPT

## 2019-12-22 PROCEDURE — 63600175 PHARM REV CODE 636 W HCPCS: Performed by: SURGERY

## 2019-12-22 PROCEDURE — 93010 ELECTROCARDIOGRAM REPORT: CPT | Mod: ,,, | Performed by: INTERNAL MEDICINE

## 2019-12-22 PROCEDURE — 25000003 PHARM REV CODE 250: Performed by: EMERGENCY MEDICINE

## 2019-12-22 PROCEDURE — 99285 EMERGENCY DEPT VISIT HI MDM: CPT | Mod: 25

## 2019-12-22 PROCEDURE — 83605 ASSAY OF LACTIC ACID: CPT

## 2019-12-22 PROCEDURE — 63600175 PHARM REV CODE 636 W HCPCS: Performed by: STUDENT IN AN ORGANIZED HEALTH CARE EDUCATION/TRAINING PROGRAM

## 2019-12-22 PROCEDURE — 36000705 HC OR TIME LEV I EA ADD 15 MIN: Performed by: SURGERY

## 2019-12-22 PROCEDURE — 25000003 PHARM REV CODE 250: Performed by: SURGERY

## 2019-12-22 PROCEDURE — 87077 CULTURE AEROBIC IDENTIFY: CPT | Mod: 59

## 2019-12-22 PROCEDURE — 37000008 HC ANESTHESIA 1ST 15 MINUTES: Performed by: SURGERY

## 2019-12-22 RX ORDER — ONDANSETRON 2 MG/ML
4 INJECTION INTRAMUSCULAR; INTRAVENOUS
Status: COMPLETED | OUTPATIENT
Start: 2019-12-22 | End: 2019-12-22

## 2019-12-22 RX ORDER — SODIUM CHLORIDE 0.9 % (FLUSH) 0.9 %
10 SYRINGE (ML) INJECTION
Status: DISCONTINUED | OUTPATIENT
Start: 2019-12-22 | End: 2019-12-22 | Stop reason: HOSPADM

## 2019-12-22 RX ORDER — HYDROCHLOROTHIAZIDE 25 MG/1
25 TABLET ORAL DAILY
Status: DISCONTINUED | OUTPATIENT
Start: 2019-12-22 | End: 2019-12-24 | Stop reason: HOSPADM

## 2019-12-22 RX ORDER — MORPHINE SULFATE 4 MG/ML
4 INJECTION, SOLUTION INTRAMUSCULAR; INTRAVENOUS
Status: COMPLETED | OUTPATIENT
Start: 2019-12-22 | End: 2019-12-22

## 2019-12-22 RX ORDER — DICYCLOMINE HYDROCHLORIDE 10 MG/1
10 CAPSULE ORAL 3 TIMES DAILY
Status: DISCONTINUED | OUTPATIENT
Start: 2019-12-22 | End: 2019-12-24 | Stop reason: HOSPADM

## 2019-12-22 RX ORDER — IBUPROFEN 200 MG
16 TABLET ORAL
Status: DISCONTINUED | OUTPATIENT
Start: 2019-12-22 | End: 2019-12-24 | Stop reason: HOSPADM

## 2019-12-22 RX ORDER — ONDANSETRON 2 MG/ML
4 INJECTION INTRAMUSCULAR; INTRAVENOUS EVERY 6 HOURS PRN
Status: DISCONTINUED | OUTPATIENT
Start: 2019-12-22 | End: 2019-12-24 | Stop reason: HOSPADM

## 2019-12-22 RX ORDER — SODIUM CHLORIDE 9 MG/ML
INJECTION, SOLUTION INTRAVENOUS CONTINUOUS
Status: DISCONTINUED | OUTPATIENT
Start: 2019-12-22 | End: 2019-12-24 | Stop reason: HOSPADM

## 2019-12-22 RX ORDER — SODIUM CHLORIDE, SODIUM LACTATE, POTASSIUM CHLORIDE, CALCIUM CHLORIDE 600; 310; 30; 20 MG/100ML; MG/100ML; MG/100ML; MG/100ML
INJECTION, SOLUTION INTRAVENOUS CONTINUOUS PRN
Status: DISCONTINUED | OUTPATIENT
Start: 2019-12-22 | End: 2019-12-22

## 2019-12-22 RX ORDER — ONDANSETRON 4 MG/1
8 TABLET, ORALLY DISINTEGRATING ORAL EVERY 6 HOURS PRN
Status: DISCONTINUED | OUTPATIENT
Start: 2019-12-22 | End: 2019-12-24 | Stop reason: HOSPADM

## 2019-12-22 RX ORDER — LIDOCAINE HYDROCHLORIDE 10 MG/ML
1 INJECTION, SOLUTION EPIDURAL; INFILTRATION; INTRACAUDAL; PERINEURAL ONCE
Status: DISCONTINUED | OUTPATIENT
Start: 2019-12-22 | End: 2019-12-24 | Stop reason: HOSPADM

## 2019-12-22 RX ORDER — ROCURONIUM BROMIDE 10 MG/ML
INJECTION, SOLUTION INTRAVENOUS
Status: DISCONTINUED | OUTPATIENT
Start: 2019-12-22 | End: 2019-12-22

## 2019-12-22 RX ORDER — ONDANSETRON 2 MG/ML
4 INJECTION INTRAMUSCULAR; INTRAVENOUS DAILY PRN
Status: DISCONTINUED | OUTPATIENT
Start: 2019-12-22 | End: 2019-12-22 | Stop reason: HOSPADM

## 2019-12-22 RX ORDER — CYPROHEPTADINE HYDROCHLORIDE 4 MG/1
4 TABLET ORAL DAILY
Status: DISCONTINUED | OUTPATIENT
Start: 2019-12-22 | End: 2019-12-24 | Stop reason: HOSPADM

## 2019-12-22 RX ORDER — CARVEDILOL 25 MG/1
25 TABLET ORAL DAILY
Status: DISCONTINUED | OUTPATIENT
Start: 2019-12-22 | End: 2019-12-22 | Stop reason: SDUPTHER

## 2019-12-22 RX ORDER — PROPOFOL 10 MG/ML
INJECTION, EMULSION INTRAVENOUS
Status: DISCONTINUED | OUTPATIENT
Start: 2019-12-22 | End: 2019-12-22

## 2019-12-22 RX ORDER — MORPHINE SULFATE 2 MG/ML
2 INJECTION, SOLUTION INTRAMUSCULAR; INTRAVENOUS EVERY 4 HOURS PRN
Status: DISCONTINUED | OUTPATIENT
Start: 2019-12-22 | End: 2019-12-24 | Stop reason: HOSPADM

## 2019-12-22 RX ORDER — SUCCINYLCHOLINE CHLORIDE 20 MG/ML
INJECTION INTRAMUSCULAR; INTRAVENOUS
Status: DISCONTINUED | OUTPATIENT
Start: 2019-12-22 | End: 2019-12-22

## 2019-12-22 RX ORDER — ONDANSETRON 2 MG/ML
INJECTION INTRAMUSCULAR; INTRAVENOUS
Status: DISCONTINUED | OUTPATIENT
Start: 2019-12-22 | End: 2019-12-22

## 2019-12-22 RX ORDER — IBUPROFEN 200 MG
24 TABLET ORAL
Status: DISCONTINUED | OUTPATIENT
Start: 2019-12-22 | End: 2019-12-24 | Stop reason: HOSPADM

## 2019-12-22 RX ORDER — BACITRACIN 50000 [IU]/1
INJECTION, POWDER, FOR SOLUTION INTRAMUSCULAR
Status: DISCONTINUED | OUTPATIENT
Start: 2019-12-22 | End: 2019-12-22 | Stop reason: HOSPADM

## 2019-12-22 RX ORDER — CARVEDILOL 25 MG/1
25 TABLET ORAL DAILY
Status: DISCONTINUED | OUTPATIENT
Start: 2019-12-22 | End: 2019-12-24 | Stop reason: HOSPADM

## 2019-12-22 RX ORDER — SODIUM CHLORIDE 9 MG/ML
INJECTION, SOLUTION INTRAVENOUS CONTINUOUS
Status: CANCELLED | OUTPATIENT
Start: 2019-12-22

## 2019-12-22 RX ORDER — GLUCAGON 1 MG
1 KIT INJECTION
Status: DISCONTINUED | OUTPATIENT
Start: 2019-12-22 | End: 2019-12-24 | Stop reason: HOSPADM

## 2019-12-22 RX ORDER — INSULIN ASPART 100 [IU]/ML
1-10 INJECTION, SOLUTION INTRAVENOUS; SUBCUTANEOUS
Status: DISCONTINUED | OUTPATIENT
Start: 2019-12-22 | End: 2019-12-24 | Stop reason: HOSPADM

## 2019-12-22 RX ORDER — OXYCODONE AND ACETAMINOPHEN 5; 325 MG/1; MG/1
1 TABLET ORAL
Status: DISCONTINUED | OUTPATIENT
Start: 2019-12-22 | End: 2019-12-22 | Stop reason: HOSPADM

## 2019-12-22 RX ORDER — NAPROXEN 250 MG/1
500 TABLET ORAL 2 TIMES DAILY WITH MEALS
Status: DISCONTINUED | OUTPATIENT
Start: 2019-12-22 | End: 2019-12-24 | Stop reason: HOSPADM

## 2019-12-22 RX ORDER — METOCLOPRAMIDE 10 MG/1
10 TABLET ORAL EVERY 6 HOURS PRN
Status: DISCONTINUED | OUTPATIENT
Start: 2019-12-22 | End: 2019-12-24 | Stop reason: HOSPADM

## 2019-12-22 RX ORDER — MORPHINE SULFATE 4 MG/ML
4 INJECTION, SOLUTION INTRAMUSCULAR; INTRAVENOUS EVERY 4 HOURS PRN
Status: CANCELLED | OUTPATIENT
Start: 2019-12-22

## 2019-12-22 RX ORDER — MUPIROCIN 20 MG/G
OINTMENT TOPICAL
Status: CANCELLED | OUTPATIENT
Start: 2019-12-22

## 2019-12-22 RX ORDER — ACETAMINOPHEN 325 MG/1
650 TABLET ORAL EVERY 8 HOURS PRN
Status: DISCONTINUED | OUTPATIENT
Start: 2019-12-22 | End: 2019-12-24 | Stop reason: HOSPADM

## 2019-12-22 RX ORDER — FENTANYL CITRATE 50 UG/ML
INJECTION, SOLUTION INTRAMUSCULAR; INTRAVENOUS
Status: DISCONTINUED | OUTPATIENT
Start: 2019-12-22 | End: 2019-12-22

## 2019-12-22 RX ORDER — SODIUM CHLORIDE 0.9 % (FLUSH) 0.9 %
10 SYRINGE (ML) INJECTION
Status: DISCONTINUED | OUTPATIENT
Start: 2019-12-22 | End: 2019-12-24 | Stop reason: HOSPADM

## 2019-12-22 RX ORDER — LIDOCAINE HYDROCHLORIDE 10 MG/ML
INJECTION, SOLUTION EPIDURAL; INFILTRATION; INTRACAUDAL; PERINEURAL
Status: DISCONTINUED | OUTPATIENT
Start: 2019-12-22 | End: 2019-12-22 | Stop reason: HOSPADM

## 2019-12-22 RX ORDER — LIDOCAINE HCL/PF 100 MG/5ML
SYRINGE (ML) INTRAVENOUS
Status: DISCONTINUED | OUTPATIENT
Start: 2019-12-22 | End: 2019-12-22

## 2019-12-22 RX ORDER — HYDROCODONE BITARTRATE AND ACETAMINOPHEN 5; 325 MG/1; MG/1
1 TABLET ORAL EVERY 4 HOURS PRN
Status: DISCONTINUED | OUTPATIENT
Start: 2019-12-22 | End: 2019-12-24 | Stop reason: HOSPADM

## 2019-12-22 RX ORDER — HYDROMORPHONE HYDROCHLORIDE 2 MG/ML
0.2 INJECTION, SOLUTION INTRAMUSCULAR; INTRAVENOUS; SUBCUTANEOUS EVERY 5 MIN PRN
Status: DISCONTINUED | OUTPATIENT
Start: 2019-12-22 | End: 2019-12-22 | Stop reason: HOSPADM

## 2019-12-22 RX ADMIN — INSULIN ASPART 2 UNITS: 100 INJECTION, SOLUTION INTRAVENOUS; SUBCUTANEOUS at 10:12

## 2019-12-22 RX ADMIN — ROCURONIUM BROMIDE 10 MG: 10 INJECTION, SOLUTION INTRAVENOUS at 11:12

## 2019-12-22 RX ADMIN — SODIUM CHLORIDE: 0.9 INJECTION, SOLUTION INTRAVENOUS at 10:12

## 2019-12-22 RX ADMIN — ONDANSETRON 8 MG: 4 TABLET, ORALLY DISINTEGRATING ORAL at 03:12

## 2019-12-22 RX ADMIN — DICYCLOMINE HYDROCHLORIDE 10 MG: 10 CAPSULE ORAL at 10:12

## 2019-12-22 RX ADMIN — ONDANSETRON 4 MG: 2 INJECTION, SOLUTION INTRAMUSCULAR; INTRAVENOUS at 12:12

## 2019-12-22 RX ADMIN — SODIUM CHLORIDE 1000 ML: 0.9 INJECTION, SOLUTION INTRAVENOUS at 06:12

## 2019-12-22 RX ADMIN — HYDROCHLOROTHIAZIDE 25 MG: 25 TABLET ORAL at 03:12

## 2019-12-22 RX ADMIN — LIDOCAINE HYDROCHLORIDE 100 MG: 20 INJECTION, SOLUTION INTRAVENOUS at 11:12

## 2019-12-22 RX ADMIN — DICYCLOMINE HYDROCHLORIDE 10 MG: 10 CAPSULE ORAL at 03:12

## 2019-12-22 RX ADMIN — MORPHINE SULFATE 4 MG: 4 INJECTION, SOLUTION INTRAMUSCULAR; INTRAVENOUS at 06:12

## 2019-12-22 RX ADMIN — OXYCODONE AND ACETAMINOPHEN 1 TABLET: 5; 325 TABLET ORAL at 01:12

## 2019-12-22 RX ADMIN — CARVEDILOL 25 MG: 25 TABLET, FILM COATED ORAL at 03:12

## 2019-12-22 RX ADMIN — PIPERACILLIN AND TAZOBACTAM 4.5 G: 4; .5 INJECTION, POWDER, LYOPHILIZED, FOR SOLUTION INTRAVENOUS; PARENTERAL at 03:12

## 2019-12-22 RX ADMIN — ONDANSETRON 4 MG: 2 INJECTION INTRAMUSCULAR; INTRAVENOUS at 06:12

## 2019-12-22 RX ADMIN — SUCCINYLCHOLINE CHLORIDE 120 MG: 20 INJECTION, SOLUTION INTRAMUSCULAR; INTRAVENOUS at 11:12

## 2019-12-22 RX ADMIN — FENTANYL CITRATE 100 MCG: 50 INJECTION, SOLUTION INTRAMUSCULAR; INTRAVENOUS at 11:12

## 2019-12-22 RX ADMIN — NAPROXEN 500 MG: 250 TABLET ORAL at 04:12

## 2019-12-22 RX ADMIN — CYPROHEPTADINE HYDROCHLORIDE 4 MG: 4 TABLET ORAL at 03:12

## 2019-12-22 RX ADMIN — PIPERACILLIN AND TAZOBACTAM 4.5 G: 4; .5 INJECTION, POWDER, LYOPHILIZED, FOR SOLUTION INTRAVENOUS; PARENTERAL at 07:12

## 2019-12-22 RX ADMIN — PIPERACILLIN AND TAZOBACTAM 4.5 G: 4; .5 INJECTION, POWDER, LYOPHILIZED, FOR SOLUTION INTRAVENOUS; PARENTERAL at 10:12

## 2019-12-22 RX ADMIN — SODIUM CHLORIDE, SODIUM LACTATE, POTASSIUM CHLORIDE, AND CALCIUM CHLORIDE: .6; .31; .03; .02 INJECTION, SOLUTION INTRAVENOUS at 11:12

## 2019-12-22 RX ADMIN — PROPOFOL 100 MG: 10 INJECTION, EMULSION INTRAVENOUS at 11:12

## 2019-12-22 NOTE — ED NOTES
Pt presents to the ED secondary to LLQ abdominal pain that has been worsening over the last 4-5 days. Pt had previous colostomy at her LLQ which was reversed. Pt also has large midline scar down her abdomen; small section at bottom of scar appears to be dehisced and has white drainage. Wound currently has dressing in place. Pt denies fever, chills, n/v/d.

## 2019-12-22 NOTE — H&P
Chief Complaint   Patient presents with    Abdominal Pain       69y F ambulatory to ED with c/o LLQ absominal pain. pt has abscess to area from previous colostomy reversal         Time seen by provider: 6:05 AM on 2019     The patient is a 69 y.o. female with PMHx of DM and HTN who presents to the ED with complaint of worsening left lower abdominal pain for 4-5 days. The pain is primarily located over the scar of her prior colostomy site from 3 years ago. She has an lower abdominal wound that is actively drainage pus. She had a CT of her abdomen 4 days ago. Denies fever or any other symptoms at this time. PSHx also includes cholecystectomy, incisional hernia repair. Note, patient is Croatian speaking only and her brother is translating.     The history is provided by the patient and a relative.            Review of patient's allergies indicates:   Allergen Reactions    Chlorhexidine gluconate Rash       Chlorhexidine/alcohol wipes. Rash and blistering.           Past Medical History:   Diagnosis Date    Diabetes mellitus      Diabetes mellitus, type 2      Hypertension              Past Surgical History:   Procedure Laterality Date     SECTION, CLASSIC         x2    CHOLECYSTECTOMY       COLON SURGERY        \[facility] [place] [facility]\""    COLONOSCOPY N/A 2018     Procedure: COLONOSCOPY;  Surgeon: Gibran Aguayo MD;  [facility];  Service: Endoscopy;  Laterality: N/A;    COLOSTOMY Left     CYSTOSCOPY WITH URETEROSCOPY, RETROGRADE PYELOGRAPHY, AND INSERTION OF STENT Left 2019     Procedure: CYSTOSCOPY, WITH RETROGRADE PYELOGRAM AND URETERAL STENT INSERTION with placement of a muir cath;  Surgeon: Ulisses Horton MD;  Location: [facility] OR;  Service: General;  Laterality: Left;    INCISION OF ABDOMINAL WALL N/A 8/10/2018     Procedure: INCISION, ABDOMINAL WALL;  Surgeon: Ulisses Horton MD;  Location: [facility] OR;  Service: General;  Laterality: N/A;    INCISIONAL HERNIA REPAIR  Right 2010      No family history on file.  Social History           Tobacco Use    Smoking status: Never Smoker    Smokeless tobacco: Never Used   Substance Use Topics    Alcohol use: No    Drug use: No      Review of Systems   Constitutional: Negative for fever.   HENT: Negative for sore throat.    Respiratory: Negative for shortness of breath.    Cardiovascular: Negative for chest pain.   Gastrointestinal: Positive for abdominal pain. Negative for nausea.   Genitourinary: Negative for dysuria.   Musculoskeletal: Negative for back pain.   Skin: Positive for wound. Negative for rash.   Neurological: Negative for weakness.   Hematological: Does not bruise/bleed easily.   All other systems reviewed and are negative.        Physical Exam             Initial Vitals [12/22/19 0522]   BP Pulse Resp Temp SpO2   (!) 145/79 101 17 98.3 °F (36.8 °C) 99 %       MAP           --              Physical Exam     Nursing note and vitals reviewed.  Constitutional: She appears well-developed and well-nourished. She appears distressed (moderate).   HENT:   Head: Normocephalic and atraumatic.   Eyes: Conjunctivae and EOM are normal. Pupils are equal, round, and reactive to light.   Neck: Normal range of motion.   Cardiovascular: Normal rate, regular rhythm, normal heart sounds and intact distal pulses.   Pulmonary/Chest: No respiratory distress.   Abdominal: She exhibits no distension. There is tenderness.   Musculoskeletal: Normal range of motion. She exhibits no edema.   Neurological: She is alert. No cranial nerve deficit. GCS eye subscore is 4. GCS verbal subscore is 5. GCS motor subscore is 6.   Skin: Skin is warm and dry. There is erythema.   8 cm x 5 cm area of erythema and tenderness of the left lower abdomen with overlying healed scar.   There is underlying induration.   There is pinpoint ulceration below umbilicalis that is draining purulent fluid.   Psychiatric: She has a normal mood and affect. Thought content normal.             Imp: Abscess abdominal wall     Plan: I&D today.

## 2019-12-22 NOTE — ED NOTES
Assumed care of patient. Pt states pain has improved, and now only c/o mild abdominal pain. Denies needs at this time.

## 2019-12-22 NOTE — ED PROVIDER NOTES
Encounter Date: 2019    SCRIBE #1 NOTE: I, Joan Troy am scribing for, and in the presence of, Dr. Cruz.       History     Chief Complaint   Patient presents with    Abdominal Pain     69y F ambulatory to ED with c/o LLQ absominal pain. pt has abscess to area from previous colostomy reversal       Time seen by provider: 6:05 AM on 2019    The patient is a 69 y.o. female with PMHx of DM and HTN who presents to the ED with complaint of worsening left lower abdominal pain for 4-5 days. The pain is primarily located over the scar of her prior colostomy site from 3 years ago. She has an lower abdominal wound that is actively drainage pus. She had a CT of her abdomen 4 days ago. Denies fever or any other symptoms at this time. PSHx also includes cholecystectomy, incisional hernia repair. Note, patient is [place]orese speaking only and her brother is translating.    The history is provided by the patient and a relative.     Review of patient's allergies indicates:   Allergen Reactions    Chlorhexidine gluconate Rash     Chlorhexidine/alcohol wipes. Rash and blistering.     Past Medical History:   Diagnosis Date    Diabetes mellitus     Diabetes mellitus, type 2     Hypertension      Past Surgical History:   Procedure Laterality Date     SECTION, CLASSIC      x2    CHOLECYSTECTOMY      [facility]      [facility]    COLONOSCOPY N/A 2018    Procedure: COLONOSCOPY;  Surgeon: Gibran Aguayo MD;  Location: [facility];  Service: Endoscopy;  Laterality: N/A;    COLOSTOMY Left     CYSTOSCOPY WITH URETEROSCOPY, RETROGRADE PYELOGRAPHY, AND INSERTION OF STENT Left 2019    Procedure: CYSTOSCOPY, WITH RETROGRADE PYELOGRAM AND URETERAL STENT INSERTION with placement of a muir cath;  Surgeon: Ulisses Horton MD;  Location: [facility] OR;  Service: General;  Laterality: Left;    INCISION OF ABDOMINAL WALL N/A 8/10/2018    Procedure: INCISION, ABDOMINAL WALL;  Surgeon: Ulisses ABAD  MD Clifford;  Location: [facility] OR;  Service: General;  Laterality: N/A;    INCISIONAL HERNIA REPAIR Right 2010     No family history on file.  Social History     Tobacco Use    Smoking status: Never Smoker    Smokeless tobacco: Never Used   Substance Use Topics    Alcohol use: No    Drug use: No     Review of Systems   Constitutional: Negative for fever.   HENT: Negative for sore throat.    Respiratory: Negative for shortness of breath.    Cardiovascular: Negative for chest pain.   Gastrointestinal: Positive for abdominal pain. Negative for nausea.   Genitourinary: Negative for dysuria.   Musculoskeletal: Negative for back pain.   Skin: Positive for wound. Negative for rash.   Neurological: Negative for weakness.   Hematological: Does not bruise/bleed easily.   All other systems reviewed and are negative.      Physical Exam     Initial Vitals [12/22/19 0522]   BP Pulse Resp Temp SpO2   (!) 145/79 101 17 98.3 °F (36.8 °C) 99 %      MAP       --         Physical Exam    Nursing note and vitals reviewed.  Constitutional: She appears well-developed and well-nourished. She appears distressed (moderate).   HENT:   Head: Normocephalic and atraumatic.   Eyes: Conjunctivae and EOM are normal. Pupils are equal, round, and reactive to light.   Neck: Normal range of motion.   Cardiovascular: Normal rate, regular rhythm, normal heart sounds and intact distal pulses.   Pulmonary/Chest: No respiratory distress.   Abdominal: She exhibits no distension. There is tenderness.   Musculoskeletal: Normal range of motion. She exhibits no edema.   Neurological: She is alert. No cranial nerve deficit. GCS eye subscore is 4. GCS verbal subscore is 5. GCS motor subscore is 6.   Skin: Skin is warm and dry. There is erythema.   8 cm x 5 cm area of erythema and tenderness of the left lower abdomen with overlying healed scar.   There is underlying induration.   There is pinpoint ulceration below umbilicalis that is draining purulent fluid.    Psychiatric: She has a normal mood and affect. Thought content normal.         ED Course   Procedures  Labs Reviewed   CBC W/ AUTO DIFFERENTIAL - Abnormal; Notable for the following components:       Result Value    RBC 3.85 (*)     Hemoglobin 8.5 (*)     Hematocrit 27.4 (*)     Mean Corpuscular Volume 71 (*)     Mean Corpuscular Hemoglobin 22.1 (*)     Mean Corpuscular Hemoglobin Conc 31.0 (*)     RDW 15.3 (*)     Platelets 503 (*)     MPV 7.8 (*)     Gran # (ANC) 7.9 (*)     Gran% 76.9 (*)     Lymph% 13.5 (*)     All other components within normal limits   COMPREHENSIVE METABOLIC PANEL - Abnormal; Notable for the following components:    Sodium 135 (*)     Glucose 172 (*)     Albumin 2.7 (*)     ALT 9 (*)     eGFR if  54 (*)     eGFR if non  47 (*)     All other components within normal limits   URINALYSIS, REFLEX TO URINE CULTURE - Abnormal; Notable for the following components:    Protein, UA 1+ (*)     All other components within normal limits    Narrative:     Preferred Collection Type->Urine, Clean Catch   CULTURE, BLOOD   CULTURE, BLOOD   LACTIC ACID, PLASMA   URINALYSIS MICROSCOPIC    Narrative:     Preferred Collection Type->Urine, Clean Catch          Imaging Results    None          Medical Decision Making:   History:   Old Medical Records: I decided to obtain old medical records.  Clinical Tests:   Lab Tests: Ordered and Reviewed  ED Management:  @0897 I spoke with Dr. Horton who will admit the patient.             Scribe Attestation:   Scribe #1: I performed the above scribed service and the documentation accurately describes the services I performed. I attest to the accuracy of the note.                          Clinical Impression:       ICD-10-CM ICD-9-CM   1. Abdominal wall abscess L02.211 682.2   2. Postprocedural intraabdominal abscess T81.49XA 998.59   3. History of hemicolectomy Z90.49 V15.29   4. Status post Aiyana's procedure Z93.3 V44.3   5. Essential  hypertension I10 401.9   6. Type 2 diabetes mellitus without complication, without long-term current use of insulin E11.9 250.00   7. Infection due to ESBL-producing Escherichia coli A49.8 041.49    Z16.12 V09.1   8. Small bowel obstruction K56.609 560.9   9. 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, with long-term current use of insulin E11.622 250.80    Z79.4 V58.67   10. LLQ pain R10.32 789.04   11. Polyp of colon, unspecified part of colon, unspecified type K63.5 211.3       I, Dr. Robbie Cruz, personally performed the services described in this documentation. All medical record entries made by the scribe were at my direction and in my presence. I have reviewed the chart and agree that the record reflects my personal performance and is accurate and complete. Robbie Cruz MD.  10:53 AM 12/22/2019                        Robbie Cruz MD  12/22/19 1104       Robbie Cruz MD  12/22/19 2153

## 2019-12-22 NOTE — PLAN OF CARE
Patient was nauseous after arrival to unit post op.  Medicated per order.  Patient stable, no c/o nausea at this time.

## 2019-12-22 NOTE — PLAN OF CARE
VN cued into room.  Pt resting comfortably in bed with call light and personal belongings within reach.  RN currently in room.  NADN.  No family at bedside.  Pt able to answer admission questions, pt reporting nausea.  No other needs or complaints at this time.  Reminded pt to use call light as needed.  VN will continue to follow and be available as needed.

## 2019-12-22 NOTE — OP NOTE
[facility]  Brief Operative Note    SUMMARY     Surgery Date: 12/22/2019     Surgeon(s) and Role:     * Ulisses Horton MD - Primary    Assisting Surgeon: None    Pre-op Diagnosis:  Postprocedural intraabdominal abscess [T81.49XA]  History of hemicolectomy [Z90.49]  Status post Aiyana's procedure [Z93.3]  Essential hypertension [I10]  Type 2 diabetes mellitus without complication, without long-term current use of insulin [E11.9]    Post-op Diagnosis:  Post-Op Diagnosis Codes:     * Postprocedural intraabdominal abscess [T81.49XA]     * History of hemicolectomy [Z90.49]     * Status post Aiyana's procedure [Z93.3]     * Essential hypertension [I10]     * Type 2 diabetes mellitus without complication, without long-term current use of insulin [E11.9]    Procedure(s) (LRB):  INCISION AND DRAINAGE, ABSCESS (N/A)  Abdomen with washout and drainage.  Anesthesia: General    Description of Procedure:  After satisfactory general endotracheal anesthesia patient in supine position time-out was called the patient was properly recognized area was confirmed abdomen was then prepped and draped normal sterile manner using Betadine scrub solution incision is made at the area of the palpable tender abscess in left lower quadrant and previous colostomy site pus was drained out with air leaking out which was cultured for aerobic and anaerobic dual abscess cavity was then debrided and cultured it was tracking to intra-abdominal collection which patient previously had 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    Description of the findings of the procedure:  I&D excision debridement abdominal wall and intra-abdominal abscesses with drainage and debridement patient tolerated well wound was packed with iodoform packing.    Estimated Blood Loss:  40 cc         Specimens:   Specimen (12h ago, onward)    None

## 2019-12-22 NOTE — ED NOTES
Pt rec'd alert and oriented , pt pain to abdomen 8/10 , pt to go to surgery for I and D . Vs stable

## 2019-12-22 NOTE — ANESTHESIA PREPROCEDURE EVALUATION
2019     .      Patient is Kiswahili speaking,  present during visit.    Needs PCP documentation of medical optimization.    Past Medical History:   Diagnosis Date    Diabetes mellitus     Diabetes mellitus, type 2     Hypertension      Past Surgical History:   Procedure Laterality Date     SECTION, CLASSIC      x2    CHOLECYSTECTOMY      COLON SURGERY      [facility]    COLONOSCOPY N/A 2018    Procedure: COLONOSCOPY;  Surgeon: Gibran Aguayo MD;  Location: [facility] ENDO;  Service: Endoscopy;  Laterality: N/A;    COLOSTOMY Left     CYSTOSCOPY WITH URETEROSCOPY, RETROGRADE PYELOGRAPHY, AND INSERTION OF STENT Left 2019    Procedure: CYSTOSCOPY, WITH RETROGRADE PYELOGRAM AND URETERAL STENT INSERTION with placement of a muir cath;  Surgeon: Ulisses Horton MD;  Location: [facility] OR;  Service: General;  Laterality: Left;    INCISION OF ABDOMINAL WALL N/A 8/10/2018    Procedure: INCISION, ABDOMINAL WALL;  Surgeon: Ulisses Horton MD;  Location: [facility] OR;  Service: General;  Laterality: N/A;    INCISIONAL HERNIA REPAIR Right        Pre-op Assessment      I have reviewed the Medications.     Review of Systems  Anesthesia Hx:  No problems with previous Anesthesia Denies Hx of Anesthetic complications  Denies Family Hx of Anesthesia complications.   Denies Personal Hx of Anesthesia complications.   Social:  No Alcohol Use  Tobacco Use: , quit smoking >10 years ago   Hematology/Oncology:         -- Anemia:   Cardiovascular:   Hypertension Denies Dysrhythmias.   Denies Angina.    Pulmonary:  Pulmonary Normal  Denies Shortness of breath.    Renal/:   renal calculi    Hepatic/GI:  Hepatic/GI Normal  Denies GERD. Denies Liver Disease.    Neurological:  Neurology Normal Denies TIA.  Denies CVA. Denies Seizures.    Endocrine:  Diabetes, Type 2 Diabetes ,  controlled by oral hypoglycemics, insulin. Typical AM glucose range: 170 , most recent HgA1c value was 7.9 on 8/2018.        Physical Exam  General:  Obesity    Airway/Jaw/Neck:  Airway Findings: Mouth Opening: Normal Tongue: Normal  General Airway Assessment: Adult  Mallampati: II      Dental:  Dental Findings: Upper Dentures   Chest/Lungs:  Chest/Lungs Findings: Clear to auscultation, Normal Respiratory Rate     Heart/Vascular:  Heart Findings: Rate: Normal  Rhythm: Regular Rhythm  Sounds: Normal  Heart murmur: negative       Mental Status:  Mental Status Findings:  Cooperative, Alert and Oriented           Anesthesia Plan  Type of Anesthesia, risks & benefits discussed:  Anesthesia Type:  general  Patient's Preference:   Intra-op Monitoring Plan:   Intra-op Monitoring Plan Comments:   Post Op Pain Control Plan:   Post Op Pain Control Plan Comments:   Induction:   IV  Beta Blocker:         Informed Consent: Patient understands risks and agrees with Anesthesia plan.  Questions answered.   ASA Score: 3     Day of Surgery Review of History & Physical:        Anesthesia Plan Notes: Anesthesia consent to be signed prior to procedure on 1/25/19  Needs PCP documentation of medical optimization, patient aware and states that she will see him today, 1/9/19.          Ready For Surgery From Anesthesia Perspective.

## 2019-12-23 LAB
POCT GLUCOSE: 165 MG/DL (ref 70–110)
POCT GLUCOSE: 174 MG/DL (ref 70–110)
POCT GLUCOSE: 190 MG/DL (ref 70–110)
POCT GLUCOSE: 195 MG/DL (ref 70–110)

## 2019-12-23 PROCEDURE — 63600175 PHARM REV CODE 636 W HCPCS: Performed by: SURGERY

## 2019-12-23 PROCEDURE — 11000001 HC ACUTE MED/SURG PRIVATE ROOM

## 2019-12-23 PROCEDURE — 63600175 PHARM REV CODE 636 W HCPCS: Performed by: EMERGENCY MEDICINE

## 2019-12-23 PROCEDURE — 25000003 PHARM REV CODE 250: Performed by: EMERGENCY MEDICINE

## 2019-12-23 PROCEDURE — 94761 N-INVAS EAR/PLS OXIMETRY MLT: CPT

## 2019-12-23 PROCEDURE — 25000003 PHARM REV CODE 250: Performed by: SURGERY

## 2019-12-23 RX ADMIN — NAPROXEN 500 MG: 250 TABLET ORAL at 05:12

## 2019-12-23 RX ADMIN — PIPERACILLIN AND TAZOBACTAM 4.5 G: 4; .5 INJECTION, POWDER, LYOPHILIZED, FOR SOLUTION INTRAVENOUS; PARENTERAL at 11:12

## 2019-12-23 RX ADMIN — HYDROCODONE BITARTRATE AND ACETAMINOPHEN 1 TABLET: 5; 325 TABLET ORAL at 12:12

## 2019-12-23 RX ADMIN — PIPERACILLIN AND TAZOBACTAM 4.5 G: 4; .5 INJECTION, POWDER, LYOPHILIZED, FOR SOLUTION INTRAVENOUS; PARENTERAL at 02:12

## 2019-12-23 RX ADMIN — MORPHINE SULFATE 2 MG: 2 INJECTION, SOLUTION INTRAMUSCULAR; INTRAVENOUS at 11:12

## 2019-12-23 RX ADMIN — DICYCLOMINE HYDROCHLORIDE 10 MG: 10 CAPSULE ORAL at 10:12

## 2019-12-23 RX ADMIN — DICYCLOMINE HYDROCHLORIDE 10 MG: 10 CAPSULE ORAL at 09:12

## 2019-12-23 RX ADMIN — INSULIN ASPART 2 UNITS: 100 INJECTION, SOLUTION INTRAVENOUS; SUBCUTANEOUS at 12:12

## 2019-12-23 RX ADMIN — MORPHINE SULFATE 2 MG: 2 INJECTION, SOLUTION INTRAMUSCULAR; INTRAVENOUS at 09:12

## 2019-12-23 RX ADMIN — CYPROHEPTADINE HYDROCHLORIDE 4 MG: 4 TABLET ORAL at 09:12

## 2019-12-23 RX ADMIN — HYDROCHLOROTHIAZIDE 25 MG: 25 TABLET ORAL at 09:12

## 2019-12-23 RX ADMIN — DICYCLOMINE HYDROCHLORIDE 10 MG: 10 CAPSULE ORAL at 02:12

## 2019-12-23 RX ADMIN — INSULIN ASPART 1 UNITS: 100 INJECTION, SOLUTION INTRAVENOUS; SUBCUTANEOUS at 10:12

## 2019-12-23 RX ADMIN — SODIUM CHLORIDE: 0.9 INJECTION, SOLUTION INTRAVENOUS at 05:12

## 2019-12-23 RX ADMIN — NAPROXEN 500 MG: 250 TABLET ORAL at 09:12

## 2019-12-23 RX ADMIN — CARVEDILOL 25 MG: 25 TABLET, FILM COATED ORAL at 09:12

## 2019-12-23 RX ADMIN — PIPERACILLIN AND TAZOBACTAM 4.5 G: 4; .5 INJECTION, POWDER, LYOPHILIZED, FOR SOLUTION INTRAVENOUS; PARENTERAL at 06:12

## 2019-12-23 RX ADMIN — HYDROCODONE BITARTRATE AND ACETAMINOPHEN 1 TABLET: 5; 325 TABLET ORAL at 06:12

## 2019-12-23 RX ADMIN — HYDROCODONE BITARTRATE AND ACETAMINOPHEN 1 TABLET: 5; 325 TABLET ORAL at 05:12

## 2019-12-23 RX ADMIN — INSULIN ASPART 2 UNITS: 100 INJECTION, SOLUTION INTRAVENOUS; SUBCUTANEOUS at 05:12

## 2019-12-23 RX ADMIN — SODIUM CHLORIDE: 0.9 INJECTION, SOLUTION INTRAVENOUS at 09:12

## 2019-12-23 NOTE — PLAN OF CARE
Pt vitals were maintained. Pt started IV fluids and tolerated IV antibiotics well. Pt denied any pain throughout the night, pt did have some moderate drainage Coming from wound. It was then reinforced with ABD pad and tape.  Pt is on contact precautions due to esbl in wound, pt is pending culture  Pt BG was maintained. Pt voided several times throughout the night. Will continue to monitor

## 2019-12-24 VITALS
HEIGHT: 62 IN | SYSTOLIC BLOOD PRESSURE: 134 MMHG | DIASTOLIC BLOOD PRESSURE: 67 MMHG | BODY MASS INDEX: 32.7 KG/M2 | OXYGEN SATURATION: 94 % | TEMPERATURE: 98 F | HEART RATE: 80 BPM | WEIGHT: 177.69 LBS | RESPIRATION RATE: 18 BRPM

## 2019-12-24 LAB
BACTERIA SPEC AEROBE CULT: ABNORMAL
BACTERIA SPEC AEROBE CULT: ABNORMAL
POCT GLUCOSE: 155 MG/DL (ref 70–110)
POCT GLUCOSE: 204 MG/DL (ref 70–110)

## 2019-12-24 PROCEDURE — 94761 N-INVAS EAR/PLS OXIMETRY MLT: CPT

## 2019-12-24 PROCEDURE — 63600175 PHARM REV CODE 636 W HCPCS: Performed by: EMERGENCY MEDICINE

## 2019-12-24 PROCEDURE — 25000003 PHARM REV CODE 250: Performed by: EMERGENCY MEDICINE

## 2019-12-24 PROCEDURE — 25000003 PHARM REV CODE 250: Performed by: SURGERY

## 2019-12-24 PROCEDURE — 63600175 PHARM REV CODE 636 W HCPCS: Performed by: SURGERY

## 2019-12-24 RX ADMIN — SODIUM CHLORIDE: 0.9 INJECTION, SOLUTION INTRAVENOUS at 05:12

## 2019-12-24 RX ADMIN — MORPHINE SULFATE 2 MG: 2 INJECTION, SOLUTION INTRAMUSCULAR; INTRAVENOUS at 05:12

## 2019-12-24 RX ADMIN — HYDROCHLOROTHIAZIDE 25 MG: 25 TABLET ORAL at 09:12

## 2019-12-24 RX ADMIN — HYDROCODONE BITARTRATE AND ACETAMINOPHEN 1 TABLET: 5; 325 TABLET ORAL at 09:12

## 2019-12-24 RX ADMIN — NAPROXEN 500 MG: 250 TABLET ORAL at 07:12

## 2019-12-24 RX ADMIN — DICYCLOMINE HYDROCHLORIDE 10 MG: 10 CAPSULE ORAL at 09:12

## 2019-12-24 RX ADMIN — PIPERACILLIN AND TAZOBACTAM 4.5 G: 4; .5 INJECTION, POWDER, LYOPHILIZED, FOR SOLUTION INTRAVENOUS; PARENTERAL at 07:12

## 2019-12-24 RX ADMIN — CARVEDILOL 25 MG: 25 TABLET, FILM COATED ORAL at 09:12

## 2019-12-24 RX ADMIN — CYPROHEPTADINE HYDROCHLORIDE 4 MG: 4 TABLET ORAL at 09:12

## 2019-12-24 RX ADMIN — INSULIN ASPART 2 UNITS: 100 INJECTION, SOLUTION INTRAVENOUS; SUBCUTANEOUS at 07:12

## 2019-12-24 NOTE — DISCHARGE SUMMARY
[facility]  General Surgery  Discharge Summary      Patient Name: Azucena Morrison  MRN: [id number]  Admission Date: 12/22/2019  Hospital Length of Stay: 2 days  Discharge Date and Time:  12/24/2019 11:45 AM  Attending Physician: Ulisses Horton MD   Discharging Provider: Deric Rowe MD  Primary Care Provider: Pj Sanches MD     HPI: The patient is a 69 y.o. female with PMHx of DM and HTN who presents to the ED with complaint of worsening left lower abdominal pain for 4-5 days. The pain is primarily located over the scar of her prior colostomy site from 3 years ago. She has an lower abdominal wound that is actively drainage pus. She had a CT of her abdomen 4 days ago. Denies fever or any other symptoms at this time. PSHx also includes cholecystectomy, incisional hernia repair. Note, patient is Ecuadorean speaking only and her brother is translating.    Procedure(s) (LRB):  INCISION AND DRAINAGE, ABSCESS (N/A)     Hospital Course: The pt was admitted for incision and drainage of abscess in the LLQ and pervious colostomy site on 12/22. I&D of the abscesses were performed on 12/22. The patient tolerated the procedure well. Cultures were obtained from the abscess sites. Aerobic cultures grew k. Pneumoniae and e. Coli which were sensitive to rocephin, and gentamicin. The pt was already prescribed gentamicin topical cream outpatient and will continue this outpatient. Anaerobic cultures are still pending. The patient remained afebrile and vitals were stable throughout hospital course. Pt will be discharged on home medication and to continue gentamicin topical cream to the areas of I&D. Home health will change I&D dressings M/W/F and will pack the sites with iodoform. The pt was discharged with a short course of pain medication. Pt discharged home stable.       Significant Diagnostic Studies:   Labs: All labs within the past 24 hours have been reviewed    Microbiology:   Blood Culture   Lab Results    Component Value Date    LABBLOO No Growth to date 12/22/2019    LABBLOO No Growth to date 12/22/2019   , Sputum Culture No results found for: GSRESP, RESPIRATORYC, Urine Culture    Lab Results   Component Value Date    LABURIN NO GROWTH AFTER 48 HOURS. 06/12/2008    and Wound Culture: positive for k. pnueumoniae and e. 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 Horton    Patient Instructions:      Diet diabetic     Diet Cardiac     Notify your health care provider if you experience any of the following:  temperature >100.4     Notify your health care provider if you experience any of the following:  persistent nausea and vomiting or diarrhea     Notify your health care provider if you experience any of the following:  severe uncontrolled pain     Notify your health care provider if you experience any of the following:  redness, tenderness, or signs of infection (pain, swelling, redness, odor or green/yellow discharge around incision site)     Notify your health care provider if you experience any of the following:  difficulty breathing or increased cough     Notify your health care provider if you experience any of the following:  severe persistent headache     Notify your health care provider if you experience any of the following:  worsening rash     Notify your health care provider if you  experience any of the following:  persistent dizziness, light-headedness, or visual disturbances     Notify your health care provider if you experience any of the following:  increased confusion or weakness     Change dressing (specify)   Order Comments: Dressing change: 1 time M/W/F using iodoform packing and ABD pad w/ gauze and paper tape.     Activity as tolerated     Medications:  Reconciled Home Medications:      Medication List      CHANGE how you take these medications    amoxicillin-clavulanate 875-125mg 875-125 mg per tablet  Commonly known as:  AUGMENTIN  [place] tre tableta por vía oral 2 veces al día.  (Take 1 tablet by mouth 2 (two) times daily.)  What changed:  Another medication with the same name was removed. Continue taking this medication, and follow the directions you see here.     carvedilol 25 MG tablet  Commonly known as:  COREG  [place] tre tableta (25 mg en total) por vía oral diariamente.  (Take 1 tablet (25 mg total) by mouth once daily.)  What changed:  Another medication with the same name was removed. Continue taking this medication, and follow the directions you see here.     cyproheptadine 4 mg tablet  Commonly known as:  PERIACTIN  [place] tre tableta (4 mg en total) por vía oral diariamente.  (Take 1 tablet (4 mg total) by mouth once daily.)  What changed:  Another medication with the same name was removed. Continue taking this medication, and follow the directions you see here.     HYDROcodone-acetaminophen 5-325 mg per tablet  Commonly known as:  NORCO  TAKE 1 TABLET BY MOUTH EVERY 4 HOURS  What changed:  Another medication with the same name was removed. Continue taking this medication, and follow the directions you see here.     naproxen 500 MG tablet  Commonly known as:  NAPROSYN  [place] tre tableta (500 mg en total) por vía oral 2 veces al día con las comidas.  (Take 1 tablet (500 mg total) by mouth 2 (two) times daily with meals.)  What changed:  Another medication with the same name  "was removed. Continue taking this medication, and follow the directions you see here.     ondansetron 8 MG tablet  Commonly known as:  ZOFRAN  TAKE 1 TABLET BY MOUTH TWICE DAILY  What changed:  Another medication with the same name was removed. Continue taking this medication, and follow the directions you see here.        CONTINUE taking these medications    acetaminophen 325 MG tablet  Commonly known as:  TYLENOL  Take 2 tablets (650 mg total) by mouth every 8 (eight) hours as needed.     BD Ultra-Fine Janine Pen Needle 32 gauge x 5/32" Ndle  Generic drug:  pen needle, diabetic  Use to inject insulin daily     betamethasone valerate 0.1% 0.1 % Crea  Commonly known as:  VALISONE  Apply around wound as needed daily for itching.     clotrimazole-betamethasone 1-0.05% cream  Commonly known as:  LOTRISONE  Apply topically daily     dicyclomine 10 MG capsule  Commonly known as:  BENTYL  Take 1 capsule by mouth twice a day     hydroCHLOROthiazide 25 MG tablet  Commonly known as:  HYDRODIURIL  [place] tre tableta (25 mg en total) por vía oral diariamente.  (Take 1 tablet (25 mg total) by mouth once daily.)     Lantus Solostar U-100 Insulin glargine 100 units/mL (3mL) SubQ pen  Generic drug:  insulin  Inject subcutaneously into the skin 10 units every morning.     metFORMIN 1000 MG tablet  Commonly known as:  GLUCOPHAGE  Take 1 tablet (1,000 mg total) by mouth 2 (two) times daily.     metoclopramide HCl 10 MG tablet  Commonly known as:  REGLAN  Take 1 tablet (10 mg total) by mouth every 6 (six) hours as needed (Nausea).     polyethylene glycol 17 gram/dose powder  Commonly known as:  GLYCOLAX  Take 17 g by mouth once daily.     promethazine-codeine 6.25-10 mg/5 ml 6.25-10 mg/5 mL syrup  Commonly known as:  PHENERGAN with CODEINE  Take 5 mLs by mouth 3 (three) times daily.     traMADol 50 mg tablet  Commonly known as:  Ultram  TAKE 1 TABLET BY MOUTH EVERY 6 HOURS.     Truetest Test Strips Strp  Generic drug:  blood sugar " diagnostic        STOP taking these medications    ERTAPENEM (INVANZ) 1 G/100 ML NS (READY TO MIX)     gentamicin 0.1 % ointment  Commonly known as:  GARAMYCIN            Deric Rowe MD  General Surgery  [facility]

## 2019-12-24 NOTE — PLAN OF CARE
Pt accepted by [facility]       12/24/19 8214   Post-Acute Status   Post-Acute Authorization Home Health/Hospice   Home Health/Hospice Status Authorization Obtained

## 2019-12-24 NOTE — PROGRESS NOTES
Pt being discharged per  order. IV's removed. Prescriptions delivered by bedside pharmacy. Supplies given to pt for dressing change at home. Pt had no questions or concerns at this time. Discharge paperwork given to pt to be reviewed with VN. Safety maintained. Will continue to monitor.

## 2019-12-24 NOTE — PLAN OF CARE
Discharge order noted, pt to resume care with [facility], nursing to provide pt with wound care supplies.    Discharge rounds on patient. Discussed followup appointments, blue discharge folder, discharge nurse will go over home medications and reasons for medications and final discharge instructions. All patient/caregiver questions answered. Patient verbalized understanding.    Follow-up With  Details  Why  Contact Info   Pj Sanches MD  On 1/3/2020  Time: 10:00am Hospital Follow up  [address] [address]  [phone]   Ulisses Horton MD  In 1 week  's office to contact you with follow up appointment  [address]  [address]  [place] [facility] [address]  [phone]   [facility]  [address]  [address]  [place] [address]  [phone]          12/24/19 1406   Final Note   Assessment Type Final Discharge Note   Anticipated Discharge Disposition [facility]  ([facility])   What phone number can be called within the next 1-3 days to see how you are doing after discharge? [phone]   Hospital Follow Up  Appt(s) scheduled? Yes   Discharge plans and expectations educations in teach back method with documentation complete? Yes   Right Care Referral Info   Post Acute Recommendation Home-care   Facility Name [facility]   Sandhya Whitaker RN-BC  Transitional Navigator  [phone]

## 2019-12-24 NOTE — PROGRESS NOTES
"Surgery follow up  BP (!) 107/55 (BP Location: Right arm, Patient Position: Lying)   Pulse 81   Temp 98.3 °F (36.8 °C) (Oral)   Resp 18   Ht 5' 2" (1.575 m)   Wt 80.4 kg (177 lb 4 oz)   SpO2 98%   Breastfeeding? No   BMI 32.42 kg/m²   I/O last 3 completed shifts:  In: 4327.1 [I.V.:2927.1; IV Piggyback:1400]  Out: 1100 [Urine:1100]  No intake/output data recorded.  Recent Results (from the past 336 hour(s))   CBC auto differential    Collection Time: 12/22/19  5:52 AM   Result Value Ref Range    WBC 10.26 3.90 - 12.70 K/uL    Hemoglobin 8.5 (L) 12.0 - 16.0 g/dL    Hematocrit 27.4 (L) 37.0 - 48.5 %    Platelets 503 (H) 150 - 350 K/uL     Recent Results (from the past 336 hour(s))   CBC auto differential    Collection Time: 12/22/19  5:52 AM   Result Value Ref Range    WBC 10.26 3.90 - 12.70 K/uL    Hemoglobin 8.5 (L) 12.0 - 16.0 g/dL    Hematocrit 27.4 (L) 37.0 - 48.5 %    Platelets 503 (H) 150 - 350 K/uL   moderate drainage , wound packing changed earier , contnue present treatment  "

## 2019-12-24 NOTE — PLAN OF CARE
HH orders sent to [facility].       12/24/19 1234   Post-Acute Status   Post-Acute Authorization Home Health/Hospice   Home Health/Hospice Status Referrals Sent

## 2019-12-24 NOTE — PLAN OF CARE
Problem: Adult Inpatient Plan of Care  Goal: Plan of Care Review  Outcome: Ongoing, Progressing  Ms. Morrison is resting and antibiotic and IV Fluids is infusing. Contact precaution maintained. Dressing to be changed later on this morning. Pain controlled with PRN medications. Safety maintained.

## 2019-12-24 NOTE — PLAN OF CARE
TN met with patient for discharge planning. Pt lives with her son. Pt independent with ADLs, pt able to drive self. Pt has HH with [facility] for wound care. No DME noted. Pt to discharge today. Nursing to give patient wound care supplies at discharge.       12/24/19 1230   Discharge Assessment   Assessment Type Discharge Planning Assessment   Confirmed/corrected address and phone number on facesheet? Yes   Assessment information obtained from? Patient   Expected Length of Stay (days) 2   Communicated expected length of stay with patient/caregiver yes   Prior to hospitilization cognitive status: Alert/Oriented   Prior to hospitalization functional status: Independent   Current cognitive status: Alert/Oriented   Current Functional Status: Independent   Lives With child(felicitas), adult   Able to Return to Prior Arrangements yes   Is patient able to care for self after discharge? Yes   Who are your caregiver(s) and their phone number(s)? Andry Nickerson (son) [phone]   Patient's perception of discharge disposition home health   Readmission Within the Last 30 Days no previous admission in last 30 days   Patient currently being followed by outpatient case management? No   Patient currently receives any other outside agency services? No   Equipment Currently Used at Home none   Do you have any problems affording any of your prescribed medications? No   Is the patient taking medications as prescribed? yes   Does the patient have transportation home? Yes   Transportation Anticipated family or friend will provide   Does the patient receive services at the [facility]? No   Discharge Plan A Home Health;Home with family   Discharge Plan B Home Health;Home with family   DME Needed Upon Discharge    (wound care supplies)   Patient/Family in Agreement with Plan yes

## 2019-12-24 NOTE — PLAN OF CARE
VN reviewed discharge instructions with pt in [place]. Son at bedside. AVS printed and handed to pt by bedside nurse. Reviewed follow-up appointments, medications, diet, and importance of medication compliance. Reviewed home care instructions, treatment plan, self-management, and when to seek medical attention. Allowed time for questions. All questions answered. Patient verbalized complete understanding of discharge instructions and voices no concerns.    Discharge instructions complete. Transport/wheelchair requested. Bedside nurse notified.

## 2019-12-24 NOTE — PLAN OF CARE
Pt is AAOx3 with complaints of abd pain with relief from norco and morphine IV. Pt has been up to toilet independently. Pt receiving IV fluids and antibx. Drsg changed twice today. Wounds draining copious amount of yellow/brown drainage. MD stated ok to not change packing more than once today. Pt receiving supplemental insulin for high glucose.

## 2019-12-27 LAB
BACTERIA BLD CULT: NORMAL
BACTERIA BLD CULT: NORMAL
BACTERIA SPEC ANAEROBE CULT: ABNORMAL

## 2020-01-08 ENCOUNTER — HOSPITAL ENCOUNTER (OUTPATIENT)
Dept: WOUND CARE | Facility: HOSPITAL | Age: 70
Discharge: HOME | End: 2020-01-08
Attending: SURGERY
Payer: MEDICARE

## 2020-01-08 VITALS
WEIGHT: 177 LBS | DIASTOLIC BLOOD PRESSURE: 85 MMHG | HEIGHT: 62 IN | BODY MASS INDEX: 32.57 KG/M2 | TEMPERATURE: 98 F | SYSTOLIC BLOOD PRESSURE: 154 MMHG | HEART RATE: 79 BPM

## 2020-01-08 DIAGNOSIS — L08.9 LOCAL INFECTION OF SKIN AND SUBCUTANEOUS TISSUE: ICD-10-CM

## 2020-01-08 DIAGNOSIS — E11.9 TYPE 2 DIABETES MELLITUS WITHOUT COMPLICATION, WITHOUT LONG-TERM CURRENT USE OF INSULIN: ICD-10-CM

## 2020-01-08 DIAGNOSIS — L02.211 ABSCESS OF ABDOMINAL WALL: ICD-10-CM

## 2020-01-08 DIAGNOSIS — T81.43XA POSTPROCEDURAL INTRAABDOMINAL ABSCESS: ICD-10-CM

## 2020-01-08 DIAGNOSIS — Z79.4 TYPE 2 DIABETES MELLITUS WITH OTHER SKIN ULCER, WITH LONG-TERM CURRENT USE OF INSULIN: Primary | ICD-10-CM

## 2020-01-08 DIAGNOSIS — E11.622 TYPE 2 DIABETES MELLITUS WITH OTHER SKIN ULCER, WITH LONG-TERM CURRENT USE OF INSULIN: Primary | ICD-10-CM

## 2020-01-08 DIAGNOSIS — R10.32 LLQ PAIN: ICD-10-CM

## 2020-01-08 PROCEDURE — 99213 OFFICE O/P EST LOW 20 MIN: CPT

## 2020-01-08 NOTE — PROGRESS NOTES
"Subjective:       Patient ID: Azucena Morrison is a 69 y.o. female.    Chief Complaint: Non-healing Wound    6/29/17: Pt re-admit for distal abdominal wound. Pt denies any fevers or chills but states she feels nauseous at times. Returned to [place] June 28, from [place], reports much trouble with abdominal wound while in [place].  7/6/17-- Patient scheduled for surgical drainage of wound Tuesday 7/11/17DB  7/19/17-- Patient post op clinic visit.  8/16/17: CT of abdomen and pelvis done 8/9/17 due to suspected fistula  8/23/17-- U/S of abdomen done today.  12/13/17 Wound vac with 20cc drainage in clinic today.  1/10/18: on PO abx  1/24/18: Fistulagram ordered per Dr. Horton. Patient still on PO abx  02/21/18 Patient is not on antibiotics at this time. BS fasting 135 per patient report this am.  03/07/18 Culture of Anterior Abdominal Wound is positive. No antibiotics at this time.  fasting per patient report.    03/21/18 Patient c/o nausea along with abdominal tenderness near abd midline wound. Patient states that pain level is 6 and that she passed two sero-sanguineous clots from wound. Patient is afebrile this am.  3/28/18: patient continue antibiotics and reports that pain is less than last weeks clinic visit. Drainage has decreased as well  04/04/18 Patient states that abdominal pain is "pressure" sensation and that when she pushes down on her abdomen on either side of abdominal wound she feels like she has to urinate. Patient reports pain level of 3 this am.  fasting this am per patient report.   5/2/18: Patient had Abdomina Toribio today before wound care. She had discontinued Bactrim PO per Dr. Horton's recommendation and culture results and is now taking Amoxicillin PO  6/20/18: Pt reports itching to wound and periwound   6/27/18: patient reports no new complaints with regards to her wound or abdomen, wound continues to decrease in size and drainage  8/1/18: Pt reports increased pain to abdomen with " nausea and emesis since thursday 7/26/18, denies any fevers, patient did no go to ER as instructed by home health nurse on Sat. 7/28/18.  8/29/18: Patient admitted to hospital 8/2 for abdominal wound complications. Surgery for abdominal abscess per Dr. Horton on 8/3. Patient placed on IV antibiotics and discharged home on 8/18 with [facility]  and PICC line to LUE. Patient currently on IV ertapenem. IV medication sent to patients home per [facility].   9/5/18: IV Ertapenem to continue 1 week. Culture sent to lab. [facility]  sent orders. PICC line intact to E.  9/12/18 Antibiotics completed. Culture results pending. 1 deep stitch remaining.  9/12/18: Culture positive for E. Coli, patient to continue Ertapenem IV antibiotics x 2weeks. [facility] notified  9/26/18: F/U abdominal wound, wound continues to improve. Patient will complete IV antibiotics today  10/3/18: patient c/o diarrhea for 2 days and nausea with some vomiting. Labs and stool culture ordered. IV antibiotics discontinued today  10/10/2018 patient will start on IV antibiotic Invaz IV once a day, pending PICC line placement, order placed today  for PICC per Dr. Horton.  10/24/18: patient on IV antibiotics, tolerating well. Continue for 1 week  11/7/2018: IV Antibiotic discontinued.  11/8/2018: PICC line to left upper arm discontinued by Home Health.  11/21/2018 F/u for abdominal wall fistula , c/o nausea  No vomiting , no fever  12/5/2018: F/u for abdominal wall fisula, c/o gas, Dr. Horton will order Bentyl. Surgery schedule for January 2019.  12/12/2018: F/u for abdominal wall fistula, c/o abdominal pain. Dr. Horton ordered Norco 5/325mg tab 1 PO every 4 hours as needed for pain and referred patient for x-ray of abdomen after clinic today.  12/19/2018: F/u for abdominal wall fisula, c/o nausea, Dr. Horton re ordered Ondansetron (Zofran) 8mg one tab by mouth every eight hours as needed for nausea. No changes in wound condition from  last visit noted in clinic today.  12/26/2018: F/u abdominal wall fistula drainage, no c/o at this time. Denies any abdominal pain or nausea. Dr. Horton recommends surgery first week of January 2019 for abdominal fistula treatment and possible colostomy placement, patient agree.  01/02/2019: F/u abdominal wall fistula. Dressing with large amount of drainage, malodorous, Dr. Horton is scheduling surgery for third week of January, 2019.  1/16/19: F/U Dr. Horton today in clinic, surgery scheduled for next Friday 1/25/19 with Dr. Horton.     01/23/2019: Presents to wound care clinic for f/u abdominal wound care tx. Surgery scheduled with Dr. Horton for Friday 01/25/2019. Dr. Horton explained Mrs. Morrison surgery procedure including possible complications, Nurse  (Bulgarian) present, patient verbalizes understanding of procedure including possible complications, all questions from patient were answered by Dr. Horton including blood sugar and blood pressure medications per patient's concerns. Consent for surgery and blood transfusion signed by patient, Dr. Horton and nurse witnessed.  Abdominal wound cultures collected per Dr. Horton, cultures sent to lab/micro pending results. Dr. Horton prescribed the following orders: fleet enema for Thursday 1/24/2019, clear liquid diet and not to eat after midnight all for 1/24/2019. Start taking Neomycin and erythromycin by mouth three times a day (1/24/2019) and dulcolax one tab by mouth twice a day, Mrs. Morrison voices understanding.     01/30/2019: F/U wound care treatment with Dr. Horton. Per pt, primary physician Dr. Pj Monae ordered for her to take potassium pills for three days, last day today and scheduled for lab work at primary physician clinic on 1/31/2019. Per pt. Feeling better, no more abdominal pain (went to ER 1/24/2019 and discharged 1/25/2019 c/o abd pain.). Dr. Horton awaiting on primary clearance to re-schedule surgery, per pt. She will  "call wound care clinic and Dr. Horton to make aware of clearance day. Continue with same orders for dressing change for Dr. Horton. Mrs. Morrison requesting gentamicin refill.     02/06/2019: F/u with Dr. Horton for wound care treatment. Mrs. Morrison brought to clinic a clearance for surgery by Dr. Pj Sanches dated 02/06/2019 "Hyperkalemia-Resolved K 4.8 2/1/2019, labs , Cr. 1.05, H/H 10/30. No contraindication to surgery, tight control of BS, Hydration." A copy of EKG (1/24/2019) and lab work from [facility] collected 1/31/2019, reported results 2/1/2019. Dr. Horton scheduled surgery for 02/22/2019, consent were signed on 01/23/2019, all questions were answered by Dr. Horton, this nurse  (Vietnamese) present. Education provided to patient on the importance of having a clear liquid diet the day before surgery 02/21/2019 as per Dr. Horton, new medications and preop preparation before surgery, verbalizes understanding. Dr. Horton ordered Norco 5/325 mg PO for pain, Dulcolax two tabs by mouth to take on 02/21/2019, Soap suds enema (SSE) on Thursday 02/21/2019, Golytely by mouth 2 liters on 2/21/2019, Erythromycin 500 mg one tab by mouth three times a day and Neomycin 1 gm by mouth three times a day.     2/13/2019: Presents to wound care clinic for a f/u with Dr. Horton for wound care treatment. No new orders in wound dressing change, reviewed preop orders with Mrs. Morrison per Dr. Horton, all questions answered. Surgery scheduled for 02/22/2019.  2/20/19: Continues wound care a previously ordered.  Depth measured per Dr. Horton 8cm.  Preop orders reviewed with patient who verbalized understanding.  Surgery scheduled for Fri 2/22/19.  Rx given to patient for Norco and Zofran.    03/06/19: Patient admitted to [facility] on 02/22/19 for exploratory laparotomy of abdomen per Dr. Horton. Patient discharged on 02/28/19 with home health and PICC line with IV antibiotics. Staples removed " "today in clinic. Patient denies any fever, chills, n&v; however, she states that she has "low energy." Continued with wound care as ordered.     3/13/2019: F/U with Dr. Horton for wound care treatment. Continue with same wound dressing change orders as per Dr. Horton, orders followed. Home Health to continue wound dressing change and lab draw for CBC weekly; continue IV Ertapenem/Invaz 1 gm IV daily as ordered.     3/20/2019: Clinic visit with Dr. Horton for abdominal abscess treatment. Presents with moderate draining from abdominal wound; wound size decreasing per measurement. Per Dr. Horton, apply one-piece system ostomy bag to abdominal wound for drain collection, may drain bag when it fills and may change every other day, continue applying gentamicin to wound bed before applying ostomy bag. Continue IV Invaz until completion. If ostomy bag not effective for draining collection, may return to previous orders for wound dressing change. Orders followed. Education provided to Mrs. Morrison on handwashing and ostomy care techniques, voices and demonstrates understanding.   3/27/19: Follow up with Dr. Horton today in clinic for abdominal abscess, moderated tanish yellow drainge present on dressing.  Wound slowly improving, patient refused one-piece ostomy bag stated " no it's too messy." requested to return to previous dressing prior to ostomy bag placement- iodoform gauze, aquacel extra, sm abd, and mefix tape.  Silver nitrate x1 per Dr. Horton today in clinic. Rx given to patient for PO Zofran.     04/03/2019: F/u clinic visit with Dr. Horton. Abdominal wound improving in size, picture on file. Wound cultures from abdominal abscess collected and sent to lab/micro, pending results. Mrs. Morrison states going out of the country ([place]) at the end of April and plans to stay there for approximately two months. Dr. Horton ordered IV antibiotic for two more weeks and new wound care dressing, orders " followed.     4/9/2019: Clinic visit with Dr. Horton.  Per Dr. Horton, wound deep measurement increased, draining present. Wound cultures from abdominal wound taken and sent to lab/micro, pending results. C/o abdominal pain and itching around wound, Rx for betamethasone cream 01.% and Norco 5/325mg give in clinic by Dr. Horton. Wound care dressing orders followed. Continue IV antibiotic as previously ordered.  04/17/19: F/u for non-healing wound to abdomen. Culture report negative. Dr. Horton ordered 1 more week of IV antibiotics. Continue with topical wound care as ordered.     4/24/2019: Clinic visit with Dr. Horton for abdominal wound treatment. Wound improvement present, pictures on file. Last IV antibiotic today 4/24/2019, [facility] to discontinue PICC line from left upper arm on 4/25/2019. Per Lyubov Prince, going to [place] on Friday 4/26/2019 and out of the country ([place]) on Monday 4/29/2019. This nurse spoke to Shaunna at [facility] (IV antibiotic delivery company) and informed last dose of antibiotic is today. Dr. Horton ordered Hydrocodone-acetaminophen (Norco) 5-325 mg (32 tabs), Bentyl 10 mg (120 capsules) and Gentamycin ointment. Education provided on the importance of eating food that contains iron (to prevent anemia) as well as eating meat and taking her blood pressure medications (blood pressure medication) on time, voices understanding. All questions answered by Dr. Horton. [facility] outpatient confirmed Betamethasone cream ordered on 4/10/2019 is ready for . Instructions and education provided to Mrs. Morrison on abdominal wound dressing changes, hand washing techniques and medication use, effects and side effects, voices and demonstrates understanding. Discharged home on stable conditions, [facility] will give wound care clinic a call when she is back from [place] in few months from now.     7/17/19: Readmit today to wound care clinic.  Patient was recently out of the  country visiting family in [place].  Patient complains of pain and nausea as on prior visits.  Dr. Horton seen patient today discussed with patient again placing colostomy patient refused.  Culture of wound taken, Dr. Horton restarted patient on zofran po for nausea, and Norco for pain. [facility] restarted today in clinic on Mondays and Fridays and prn. Orders given to gently pack wound with aquacel ag rope, cover with aquacel extra, 4x4 gauze, small abd pad and mefix tape change dressing ever other day by [facility] and Prn per patient.     7/24/2019: Clinic visit with Dr. Horton. Continue with dressing change as ordered. Wound cultures reviewed with patient, lab work ordered by Dr. Horton, no fever present or reported at this time. C/o decrease appetite, medication periactin prescribed. Requesting needles for insulin pen, order prescribed by Dr. Horton.      7/31/2019: F/U clinic visit with Dr. Horton. C/o of excruciated back pain 10/10, not feeling well, lot of gas and left upper quadrant discomfort. New orders prescribed: Augmentin 875-125 mg by mouth twice a day. Bentyl 10 mg one tab by mouth 4 times a day. Tramadol 50 mg one tab by mouth every 6 hours as needed for pain. Abdomen ultrasound. Wound care dressing completed as ordered.     8/7/2019: Clinic visit with Dr. Horton, denies any pain at this time. Wound care dressing done as ordered, no changes in wound size from last visit noted. Continue taking oral antiiotic as ordered, pending abdominal ultrasound, scheduled for 8/13/2019.     8/14/2019: F/U clinic visit with Dr. Horton. Admitted and discharged from emergency department this morning with abdominal pain, CT and ultrasound in filed. Dr. Horton performed a small drainage from the abdominal wound, moderate amount of creamy, serosanguineous fluid from abdominal wound present. Iodoform gauze packed into her wound, dressing changed as ordered. Wound cultures collected and sent to  lab/micro, pending results. Rx for Norco 5/325 mg one tab PO every 4 hours PRN as needed # 24 given to pt by Dr. Horton.      8/21/2019: Clinic visit with Dr. Horton. Wound dressing changed as ordered.      11/6/19: Follow up appt with Dr Horton. Wounds improving slowly. Continues to take antibiotics (Augmentin 875-125mg). No complaints voiced. Follow up in 1 week.  11/13/19:  F/U clinic visit with Dr. Horton.  Wound depth per Dr. Horton is 8.5 cm.  Continue PT Augmentin.  Patient is co left sided abdominal pain.  Abdominal US and labs ordered.  Wound care tolerated well.  Fu with Dr. Horton in 1 week.     11/20/19:Dr Horton assessed patient. Silver nitrate applied to wound. Continuing present plan of care. Patient is scheduled to have abdominal ultra sound Friday 11/22/19. Continues to take Augmentin.  F/U in 1 week.    11/27/19: Follow up in clinic with Dr. Horton. Wound depth 8.5cm, silver nitrate x 3 per Dr. Horton, patient continues taking po Augmentin, c/o left sided abdominal pain, ultrasound results discussed with patient, Dr. Horton will watch for now.  Follow up 1 week 12/4/19.     12/04/19: F/u Dr. Horton. Continue with current wound care treatment. Rx given for Bentyl 10 mg. Patient to follow up in  1 week at wound clinic  12/11/19: F/u with . Continue with current wound care treatment. Rx given for Lotrisone cream to be applied daily per patient to right lower leg rash. Patient to follow up in  1 week at wound clinic    12/18/19: F/u with Dr. Horton. Patient c/o pain 9 out of 10  to abdomen LLQ. Area of pain is warm to touch, pink, and a small nodule can be felt. Patient states that symptoms began about 4 days ago. Stat CT scan ordered. Patient will have CT done tomorrow due to not fasting this morning. Instructed patient to fast before CT scan. Continue with current wound care treatment     01/08/2020: F/u with Dr. Horton. Patient had I&D on 12/22/19 for abscess to abdomen. New  wound care orders given. Patient continues on po abx and home health for wound care. Follow up in 1 week at wound care clinic    Review of Systems   Constitutional: Negative.    HENT: Negative.    Eyes: Negative.    Respiratory: Negative.    Cardiovascular: Negative.    Gastrointestinal: Negative.    Genitourinary: Negative.    Musculoskeletal: Negative.    Skin: Negative.    Neurological: Negative.    Psychiatric/Behavioral: Negative.        Objective:      Physical Exam   Constitutional: She is oriented to person, place, and time. She appears well-developed and well-nourished.   HENT:   Head: Normocephalic.   Eyes: Pupils are equal, round, and reactive to light. Conjunctivae and EOM are normal.   Neck: Normal range of motion. Neck supple.   Cardiovascular: Normal rate, regular rhythm, normal heart sounds and intact distal pulses.   Pulmonary/Chest: Effort normal and breath sounds normal.   Abdominal: Soft. Bowel sounds are normal.   Musculoskeletal: Normal range of motion.   Neurological: She is alert and oriented to person, place, and time. She has normal reflexes.   Skin: Skin is warm and dry.       Assessment:       1. 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 Abscess of abdominal wall           Incision/Site 02/22/19 1146 Abdomen midline midline (Active)   02/22/19 1146    Present Prior to Hospital Arrival?:    Side:    Location: Abdomen   Orientation: midline   Incision Type: midline   Closure Method:    Additional Comments:    Removal Indication and Assessment:    Wound Outcome: Palliative   Removal Indications:    Dressing Appearance Intact 1/8/2020  9:00 AM   Drainage Amount Moderate 1/8/2020  9:00 AM   Drainage Characteristics/Odor Serosanguineous 1/8/2020  9:00 AM   Appearance Red;Pink 1/8/2020  9:00 AM   Red (%), Wound Tissue Color 100 % 1/8/2020  9:00 AM   Periwound Area Excoriated;Pink 1/8/2020  9:00 AM   Wound Edges Defined 1/8/2020  9:00 AM   Wound  Length (cm) 0.5 cm 1/8/2020  9:00 AM   Wound Width (cm) 0.2 cm 1/8/2020  9:00 AM   Wound Depth (cm) 6.2 cm 1/8/2020  9:00 AM   Wound Volume (cm^3) 0.62 cm^3 1/8/2020  9:00 AM   Wound Surface Area (cm^2) 0.1 cm^2 1/8/2020  9:00 AM   Care Cleansed with:;Sterile normal saline 1/8/2020  9:00 AM   Dressing Applied;Abd pad;Gauze;Other (see comments) 1/8/2020  9:00 AM   Packing Incision packed with;gauze, iodoform 1/8/2020  9:00 AM   Periwound Care Skin barrier film applied 1/8/2020  9:00 AM   Dressing Change Due 01/10/20 1/8/2020  9:00 AM            Incision/Site 12/22/19 1205 Left Abdomen (Active)   12/22/19 1205    Present Prior to Hospital Arrival?:    Side: Left   Location: Abdomen   Orientation:    Incision Type:    Closure Method:    Additional Comments:    Removal Indication and Assessment:    Wound Outcome:    Removal Indications:    Dressing Appearance Intact;Moist drainage 1/8/2020  9:00 AM   Drainage Amount Moderate 1/8/2020  9:00 AM   Drainage Characteristics/Odor Serosanguineous 1/8/2020  9:00 AM   Appearance Pink;Red;Moist 1/8/2020  9:00 AM   Red (%), Wound Tissue Color 100 % 1/8/2020  9:00 AM   Periwound Area Intact 1/8/2020  9:00 AM   Wound Edges Defined 1/8/2020  9:00 AM   Wound Length (cm) 0.3 cm 1/8/2020  9:00 AM   Wound Width (cm) 1.4 cm 1/8/2020  9:00 AM   Wound Depth (cm) 2.3 cm 1/8/2020  9:00 AM   Wound Volume (cm^3) 0.97 cm^3 1/8/2020  9:00 AM   Wound Surface Area (cm^2) 0.42 cm^2 1/8/2020  9:00 AM   Care Cleansed with:;Sterile normal saline 1/8/2020  9:00 AM   Dressing Applied;Island/border;Other (see comments) 1/8/2020  9:00 AM   Periwound Care Skin barrier film applied 1/8/2020  9:00 AM   Dressing Change Due 01/10/20 1/8/2020  9:00 AM       Abdominal wound midline  Clean wound with normal saline  Lidocaine 2% gel or 4 % Topical solution: PRN  Chauncey wound:  Maintain dry chauncey wound, Cavilon under tape, betamethasone PRN itching  Primary dressing: Apply gentamycin to wound bed, pack wound with  Iodoform gauze   Secondary dressing: cover with 4x4 gauze, small ABD pad, secure with mefix tape.  Frequency: Mon, Wed, Fri    Abdominal wound LUQ  Clean wound with normal saline  Lidocaine 2% gel or 4 % Topical solution: PRN  Danielle wound:  Cavilon  Primary dressing: Apply gentamycin to wound bed   Secondary dressing: cover with 4x4 aquacel border  Frequency: Mon, Wed, Fri    Home Health:  [facility]:  Home Health nurse to assess wound and perform wound care on Mon, Fri, and PRN    Other orders: Continue po antibiotic Augmentin.  Rx given to patient for norco 7.5 mg      Plan:                Follow up with Dr. Horton in 1 week on Wednesday 01/15/2020

## 2020-01-15 ENCOUNTER — HOSPITAL ENCOUNTER (OUTPATIENT)
Dept: WOUND CARE | Facility: HOSPITAL | Age: 70
Discharge: HOME | End: 2020-01-15
Attending: SURGERY
Payer: MEDICARE

## 2020-01-15 VITALS
BODY MASS INDEX: 32.57 KG/M2 | TEMPERATURE: 98 F | WEIGHT: 177 LBS | SYSTOLIC BLOOD PRESSURE: 135 MMHG | HEART RATE: 87 BPM | HEIGHT: 62 IN | DIASTOLIC BLOOD PRESSURE: 79 MMHG

## 2020-01-15 DIAGNOSIS — E11.620 TYPE 2 DIABETES MELLITUS WITH DIABETIC DERMATITIS, WITHOUT LONG-TERM CURRENT USE OF INSULIN: ICD-10-CM

## 2020-01-15 DIAGNOSIS — Z79.4 TYPE 2 DIABETES MELLITUS WITH OTHER SKIN ULCER, WITH LONG-TERM CURRENT USE OF INSULIN: Primary | ICD-10-CM

## 2020-01-15 DIAGNOSIS — L02.211 ABSCESS OF ABDOMINAL WALL: ICD-10-CM

## 2020-01-15 DIAGNOSIS — E11.622 TYPE 2 DIABETES MELLITUS WITH OTHER SKIN ULCER, WITH LONG-TERM CURRENT USE OF INSULIN: Primary | ICD-10-CM

## 2020-01-15 DIAGNOSIS — R10.32 LLQ PAIN: ICD-10-CM

## 2020-01-15 DIAGNOSIS — L02.211 ABDOMINAL WALL ABSCESS: ICD-10-CM

## 2020-01-15 DIAGNOSIS — K63.2 COLOCUTANEOUS FISTULA: ICD-10-CM

## 2020-01-15 PROCEDURE — 99213 OFFICE O/P EST LOW 20 MIN: CPT

## 2020-01-15 NOTE — PROGRESS NOTES
"Subjective:       Patient ID: Azucena Morrison is a 69 y.o. female.    Chief Complaint: Non-healing Wound    6/29/17: Pt re-admit for distal abdominal wound. Pt denies any fevers or chills but states she feels nauseous at times. Returned to [place] June 28, from [place], reports much trouble with abdominal wound while in [place].  7/6/17-- Patient scheduled for surgical drainage of wound Tuesday 7/11/17DB  7/19/17-- Patient post op clinic visit.  8/16/17: CT of abdomen and pelvis done 8/9/17 due to suspected fistula  8/23/17-- U/S of abdomen done today.  12/13/17 Wound vac with 20cc drainage in clinic today.  1/10/18: on PO abx  1/24/18: Fistulagram ordered per Dr. Horton. Patient still on PO abx  02/21/18 Patient is not on antibiotics at this time. BS fasting 135 per patient report this am.  03/07/18 Culture of Anterior Abdominal Wound is positive. No antibiotics at this time.  fasting per patient report.    03/21/18 Patient c/o nausea along with abdominal tenderness near abd midline wound. Patient states that pain level is 6 and that she passed two sero-sanguineous clots from wound. Patient is afebrile this am.  3/28/18: patient continue antibiotics and reports that pain is less than last weeks clinic visit. Drainage has decreased as well  04/04/18 Patient states that abdominal pain is "pressure" sensation and that when she pushes down on her abdomen on either side of abdominal wound she feels like she has to urinate. Patient reports pain level of 3 this am.  fasting this am per patient report.   5/2/18: Patient had Abdomina Toribio today before wound care. She had discontinued Bactrim PO per Dr. Horton's recommendation and culture results and is now taking Amoxicillin PO  6/20/18: Pt reports itching to wound and periwound   6/27/18: patient reports no new complaints with regards to her wound or abdomen, wound continues to decrease in size and drainage  8/1/18: Pt reports increased pain to abdomen with " nausea and emesis since thursday 7/26/18, denies any fevers, patient did no go to ER as instructed by home health nurse on Sat. 7/28/18.  8/29/18: Patient admitted to hospital 8/2 for abdominal wound complications. Surgery for abdominal abscess per Dr. Horton on 8/3. Patient placed on IV antibiotics and discharged home on 8/18 with [facility]  and PICC line to LUE. Patient currently on IV ertapenem. IV medication sent to patients home per [facility] of [facility].   9/5/18: IV Ertapenem to continue 1 week. Culture sent to lab. [facility]  sent orders. PICC line intact to E.  9/12/18 Antibiotics completed. Culture results pending. 1 deep stitch remaining.  9/12/18: Culture positive for E. Coli, patient to continue Ertapenem IV antibiotics x 2weeks. [facility] notified  9/26/18: F/U abdominal wound, wound continues to improve. Patient will complete IV antibiotics today  10/3/18: patient c/o diarrhea for 2 days and nausea with some vomiting. Labs and stool culture ordered. IV antibiotics discontinued today  10/10/2018 patient will start on IV antibiotic Invaz IV once a day, pending PICC line placement, order placed today  for PICC per Dr. Horton.  10/24/18: patient on IV antibiotics, tolerating well. Continue for 1 week  11/7/2018: IV Antibiotic discontinued.  11/8/2018: PICC line to left upper arm discontinued by Home Health.  11/21/2018 F/u for abdominal wall fistula , c/o nausea  No vomiting , no fever  12/5/2018: F/u for abdominal wall fisula, c/o gas, Dr. Horton will order Bentyl. Surgery schedule for January 2019.  12/12/2018: F/u for abdominal wall fistula, c/o abdominal pain. Dr. Horton ordered Norco 5/325mg tab 1 PO every 4 hours as needed for pain and referred patient for x-ray of abdomen after clinic today.  12/19/2018: F/u for abdominal wall fisula, c/o nausea, Dr. Horton re ordered Ondansetron (Zofran) 8mg one tab by mouth every eight hours as needed for nausea. No changes in wound condition from  last visit noted in clinic today.  12/26/2018: F/u abdominal wall fistula drainage, no c/o at this time. Denies any abdominal pain or nausea. Dr. Horton recommends surgery first week of January 2019 for abdominal fistula treatment and possible colostomy placement, patient agree.  01/02/2019: F/u abdominal wall fistula. Dressing with large amount of drainage, malodorous, Dr. Horton is scheduling surgery for third week of January, 2019.  1/16/19: F/U Dr. Horton today in clinic, surgery scheduled for next Friday 1/25/19 with Dr. Horton.     01/23/2019: Presents to wound care clinic for f/u abdominal wound care tx. Surgery scheduled with Dr. Horton for Friday 01/25/2019. Dr. Horton explained Mrs. Morrison surgery procedure including possible complications, Nurse  (Slovak) present, patient verbalizes understanding of procedure including possible complications, all questions from patient were answered by Dr. Horton including blood sugar and blood pressure medications per patient's concerns. Consent for surgery and blood transfusion signed by patient, Dr. Horton and nurse witnessed.  Abdominal wound cultures collected per Dr. Horton, cultures sent to lab/micro pending results. Dr. Horton prescribed the following orders: fleet enema for Thursday 1/24/2019, clear liquid diet and not to eat after midnight all for 1/24/2019. Start taking Neomycin and erythromycin by mouth three times a day (1/24/2019) and dulcolax one tab by mouth twice a day, Mrs. Morrison voices understanding.     01/30/2019: F/U wound care treatment with Dr. Horton. Per pt, primary physician Dr. Pj Monae ordered for her to take potassium pills for three days, last day today and scheduled for lab work at primary physician clinic on 1/31/2019. Per pt. Feeling better, no more abdominal pain (went to ER 1/24/2019 and discharged 1/25/2019 c/o abd pain.). Dr. Horton awaiting on primary clearance to re-schedule surgery, per pt. She will  "call wound care clinic and Dr. Horton to make aware of clearance day. Continue with same orders for dressing change for Dr. Horton. Mrs. Morrison requesting gentamicin refill.     02/06/2019: F/u with Dr. Horton for wound care treatment. Mrs. Morrison brought to clinic a clearance for surgery by Dr. Pj Sanches dated 02/06/2019 "Hyperkalemia-Resolved K 4.8 2/1/2019, labs , Cr. 1.05, H/H 10/30. No contraindication to surgery, tight control of BS, Hydration." A copy of EKG (1/24/2019) and lab work from [facility] collected 1/31/2019, reported results 2/1/2019. Dr. Horton scheduled surgery for 02/22/2019, consent were signed on 01/23/2019, all questions were answered by Dr. Horton, this nurse  (Kiswahili) present. Education provided to patient on the importance of having a clear liquid diet the day before surgery 02/21/2019 as per Dr. Horton, new medications and preop preparation before surgery, verbalizes understanding. Dr. Horton ordered Norco 5/325 mg PO for pain, Dulcolax two tabs by mouth to take on 02/21/2019, Soap suds enema (SSE) on Thursday 02/21/2019, Golytely by mouth 2 liters on 2/21/2019, Erythromycin 500 mg one tab by mouth three times a day and Neomycin 1 gm by mouth three times a day.     2/13/2019: Presents to wound care clinic for a f/u with Dr. Horton for wound care treatment. No new orders in wound dressing change, reviewed preop orders with Mrs. Morrison per Dr. Horton, all questions answered. Surgery scheduled for 02/22/2019.  2/20/19: Continues wound care a previously ordered.  Depth measured per Dr. Horton 8cm.  Preop orders reviewed with patient who verbalized understanding.  Surgery scheduled for Fri 2/22/19.  Rx given to patient for Norco and Zofran.    03/06/19: Patient admitted to [facility] on 02/22/19 for exploratory laparotomy of abdomen per Dr. Horton. Patient discharged on 02/28/19 with home health and PICC line with IV antibiotics. Staples removed " "today in clinic. Patient denies any fever, chills, n&v; however, she states that she has "low energy." Continued with wound care as ordered.     3/13/2019: F/U with Dr. Horton for wound care treatment. Continue with same wound dressing change orders as per Dr. Horton, orders followed. Home Health to continue wound dressing change and lab draw for CBC weekly; continue IV Ertapenem/Invaz 1 gm IV daily as ordered.     3/20/2019: Clinic visit with Dr. Horton for abdominal abscess treatment. Presents with moderate draining from abdominal wound; wound size decreasing per measurement. Per Dr. Horton, apply one-piece system ostomy bag to abdominal wound for drain collection, may drain bag when it fills and may change every other day, continue applying gentamicin to wound bed before applying ostomy bag. Continue IV Invaz until completion. If ostomy bag not effective for draining collection, may return to previous orders for wound dressing change. Orders followed. Education provided to Mrs. Morrison on handwashing and ostomy care techniques, voices and demonstrates understanding.   3/27/19: Follow up with Dr. Horton today in clinic for abdominal abscess, moderated tanish yellow drainge present on dressing.  Wound slowly improving, patient refused one-piece ostomy bag stated " no it's too messy." requested to return to previous dressing prior to ostomy bag placement- iodoform gauze, aquacel extra, sm abd, and mefix tape.  Silver nitrate x1 per Dr. Horton today in clinic. Rx given to patient for PO Zofran.     04/03/2019: F/u clinic visit with Dr. Horton. Abdominal wound improving in size, picture on file. Wound cultures from abdominal abscess collected and sent to lab/micro, pending results. Mrs. Morrison states going out of the country ([place]) at the end of April and plans to stay there for approximately two months. Dr. Horton ordered IV antibiotic for two more weeks and new wound care dressing, orders " followed.     4/9/2019: Clinic visit with Dr. Horton.  Per Dr. Horton, wound deep measurement increased, draining present. Wound cultures from abdominal wound taken and sent to lab/micro, pending results. C/o abdominal pain and itching around wound, Rx for betamethasone cream 01.% and Norco 5/325mg give in clinic by Dr. Horton. Wound care dressing orders followed. Continue IV antibiotic as previously ordered.  04/17/19: F/u for non-healing wound to abdomen. Culture report negative. Dr. Horton ordered 1 more week of IV antibiotics. Continue with topical wound care as ordered.     4/24/2019: Clinic visit with Dr. Horton for abdominal wound treatment. Wound improvement present, pictures on file. Last IV antibiotic today 4/24/2019, [facility] to discontinue PICC line from left upper arm on 4/25/2019. Per Lyubov Prince, going to [place] on Friday 4/26/2019 and out of the country ([place]) on Monday 4/29/2019. This nurse spoke to Shaunna at [employer] (IV antibiotic delivery company) and informed last dose of antibiotic is today. Dr. Horton ordered Hydrocodone-acetaminophen (Norco) 5-325 mg (32 tabs), Bentyl 10 mg (120 capsules) and Gentamycin ointment. Education provided on the importance of eating food that contains iron (to prevent anemia) as well as eating meat and taking her blood pressure medications (blood pressure medication) on time, voices understanding. All questions answered by Dr. Horton. [facility] outpatient confirmed Betamethasone cream ordered on 4/10/2019 is ready for . Instructions and education provided to Mrs. Morrison on abdominal wound dressing changes, hand washing techniques and medication use, effects and side effects, voices and demonstrates understanding. Discharged home on stable conditions, Hospitals in [place] will give wound care clinic a call when she is back from [place] in few months from now.     7/17/19: Readmit today to wound care clinic.  Patient was recently out of the  country visiting family in [place].  Patient complains of pain and nausea as on prior visits.  Dr. Horton seen patient today discussed with patient again placing colostomy patient refused.  Culture of wound taken, Dr. Horton restarted patient on zofran po for nausea, and Norco for pain. [facility] restarted today in clinic on Mondays and Fridays and prn. Orders given to gently pack wound with aquacel ag rope, cover with aquacel extra, 4x4 gauze, small abd pad and mefix tape change dressing ever other day by [facility] and Prn per patient.     7/24/2019: Clinic visit with Dr. Horton. Continue with dressing change as ordered. Wound cultures reviewed with patient, lab work ordered by Dr. Horton, no fever present or reported at this time. C/o decrease appetite, medication periactin prescribed. Requesting needles for insulin pen, order prescribed by Dr. Horton.      7/31/2019: F/U clinic visit with Dr. Horton. C/o of excruciated back pain 10/10, not feeling well, lot of gas and left upper quadrant discomfort. New orders prescribed: Augmentin 875-125 mg by mouth twice a day. Bentyl 10 mg one tab by mouth 4 times a day. Tramadol 50 mg one tab by mouth every 6 hours as needed for pain. Abdomen ultrasound. Wound care dressing completed as ordered.     8/7/2019: Clinic visit with Dr. Horton, denies any pain at this time. Wound care dressing done as ordered, no changes in wound size from last visit noted. Continue taking oral antiiotic as ordered, pending abdominal ultrasound, scheduled for 8/13/2019.     8/14/2019: F/U clinic visit with Dr. Horton. Admitted and discharged from emergency department this morning with abdominal pain, CT and ultrasound in filed. Dr. Horton performed a small drainage from the abdominal wound, moderate amount of creamy, serosanguineous fluid from abdominal wound present. Iodoform gauze packed into her wound, dressing changed as ordered. Wound cultures collected and sent to  lab/micro, pending results. Rx for Norco 5/325 mg one tab PO every 4 hours PRN as needed # 24 given to pt by Dr. Horton.      8/21/2019: Clinic visit with Dr. Horton. Wound dressing changed as ordered.      11/6/19: Follow up appt with Dr Horton. Wounds improving slowly. Continues to take antibiotics (Augmentin 875-125mg). No complaints voiced. Follow up in 1 week.  11/13/19:  F/U clinic visit with Dr. Horton.  Wound depth per Dr. Horton is 8.5 cm.  Continue PT Augmentin.  Patient is co left sided abdominal pain.  Abdominal US and labs ordered.  Wound care tolerated well.  Fu with Dr. Horton in 1 week.     11/20/19:Dr Horton assessed patient. Silver nitrate applied to wound. Continuing present plan of care. Patient is scheduled to have abdominal ultra sound Friday 11/22/19. Continues to take Augmentin.  F/U in 1 week.    11/27/19: Follow up in clinic with Dr. Horton. Wound depth 8.5cm, silver nitrate x 3 per Dr. Horton, patient continues taking po Augmentin, c/o left sided abdominal pain, ultrasound results discussed with patient, Dr. Horton will watch for now.  Follow up 1 week 12/4/19.     12/04/19: F/u Dr. Horton. Continue with current wound care treatment. Rx given for Bentyl 10 mg. Patient to follow up in  1 week at wound clinic  12/11/19: F/u with . Continue with current wound care treatment. Rx given for Lotrisone cream to be applied daily per patient to right lower leg rash. Patient to follow up in  1 week at wound clinic    12/18/19: F/u with Dr. Horton. Patient c/o pain 9 out of 10  to abdomen LLQ. Area of pain is warm to touch, pink, and a small nodule can be felt. Patient states that symptoms began about 4 days ago. Stat CT scan ordered. Patient will have CT done tomorrow due to not fasting this morning. Instructed patient to fast before CT scan. Continue with current wound care treatment     01/08/2020: F/u with Dr. Horton. Patient had I&D on 12/22/19 for abscess to abdomen. New  wound care orders given. Patient continues on po abx and home health for wound care. Follow up in 1 week at wound care clinic    01/15/20: F/u with Dr. Horton. Patient c/o mild discomfort at wound to left abdomen. Patient denies any fever, chills, n&v, diarrhea, and/or constipation. Will continue to monitor. Continue with current wound care treatment and follow up in  Wound clinic in 1 week.    Review of Systems   Constitutional: Negative.    HENT: Negative.    Eyes: Negative.    Respiratory: Negative.    Cardiovascular: Negative.    Gastrointestinal: Negative.    Genitourinary: Negative.    Musculoskeletal: Negative.    Skin: Negative.    Neurological: Negative.    Psychiatric/Behavioral: Negative.        Objective:      Physical Exam   Constitutional: She is oriented to person, place, and time. She appears well-developed and well-nourished.   HENT:   Head: Normocephalic.   Eyes: Pupils are equal, round, and reactive to light. Conjunctivae and EOM are normal.   Neck: Normal range of motion. Neck supple.   Cardiovascular: Normal rate, regular rhythm, normal heart sounds and intact distal pulses.   Pulmonary/Chest: Effort normal and breath sounds normal.   Abdominal: Soft. Bowel sounds are normal.   Musculoskeletal: Normal range of motion.   Neurological: She is alert and oriented to person, place, and time. She has normal reflexes.   Skin: Skin is warm and dry.       Assessment:       1. 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 Abscess of abdominal wall           Incision/Site 02/22/19 1146 Abdomen midline midline (Active)   02/22/19 1146    Present Prior to Hospital Arrival?:    Side:    Location: Abdomen   Orientation: midline   Incision Type: midline   Closure Method:    Additional Comments:    Removal Indication and Assessment:    Wound Outcome: Palliative   Removal Indications:    Dressing Appearance Intact 1/15/2020  9:00 AM   Drainage Amount Moderate  1/15/2020  9:00 AM   Drainage Characteristics/Odor Serosanguineous 1/15/2020  9:00 AM   Appearance Red;Pink 1/15/2020  9:00 AM   Red (%), Wound Tissue Color 100 % 1/15/2020  9:00 AM   Periwound Area Intact 1/15/2020  9:00 AM   Wound Edges Defined 1/15/2020  9:00 AM   Wound Length (cm) 0.5 cm 1/15/2020  9:00 AM   Wound Width (cm) 0.4 cm 1/15/2020  9:00 AM   Wound Depth (cm) 5.5 cm 1/15/2020  9:00 AM   Wound Volume (cm^3) 1.1 cm^3 1/15/2020  9:00 AM   Wound Surface Area (cm^2) 0.2 cm^2 1/15/2020  9:00 AM   Care Cleansed with:;Sterile normal saline 1/15/2020  9:00 AM   Dressing Applied;Other (see comments);Gauze;Abd pad 1/15/2020  9:00 AM   Packing Incision packed with;gauze, iodoform 1/15/2020  9:00 AM   Periwound Care Skin barrier film applied 1/15/2020  9:00 AM   Dressing Change Due 01/17/20 1/15/2020  9:00 AM            Incision/Site 12/22/19 1205 Left Abdomen (Active)   12/22/19 1205    Present Prior to Hospital Arrival?:    Side: Left   Location: Abdomen   Orientation:    Incision Type:    Closure Method:    Additional Comments:    Removal Indication and Assessment:    Wound Outcome:    Removal Indications:    Dressing Appearance Intact;Moist drainage 1/15/2020  9:00 AM   Drainage Amount Moderate 1/15/2020  9:00 AM   Drainage Characteristics/Odor Serosanguineous 1/15/2020  9:00 AM   Appearance Red;Pink 1/15/2020  9:00 AM   Red (%), Wound Tissue Color 100 % 1/15/2020  9:00 AM   Periwound Area Intact 1/15/2020  9:00 AM   Wound Edges Defined 1/15/2020  9:00 AM   Wound Length (cm) 0.3 cm 1/15/2020  9:00 AM   Wound Width (cm) 1.3 cm 1/15/2020  9:00 AM   Wound Depth (cm) 2.3 cm 1/15/2020  9:00 AM   Wound Volume (cm^3) 0.9 cm^3 1/15/2020  9:00 AM   Wound Surface Area (cm^2) 0.39 cm^2 1/15/2020  9:00 AM   Care Cleansed with:;Sterile normal saline 1/15/2020  9:00 AM   Dressing Applied;Other (see comments);Island/border;Calcium alginate 1/15/2020  9:00 AM   Periwound Care Skin barrier film applied 1/15/2020  9:00 AM    Dressing Change Due 01/17/20 1/15/2020  9:00 AM       Abdominal wound midline  Clean wound with normal saline  Lidocaine 2% gel or 4 % Topical solution: PRN  Chauncey wound:  Maintain dry chauncey wound, Cavilon under tape, betamethasone PRN itching  Primary dressing: Apply gentamycin to wound bed, pack wound with Iodoform gauze   Secondary dressing: cover with 4x4 gauze, small ABD pad, secure with mefix tape.  Frequency: Mon, Wed, Fri    Abdominal wound LUQ  Clean wound with normal saline  Lidocaine 2% gel or 4 % Topical solution: PRN  Chauncey wound:  Cavilon  Primary dressing: Apply gentamycin to wound bed   Secondary dressing: cover with aquacel extra and 4x4 aquacel border  Frequency: Mon, Wed, Fri    Home Health:  [facility]:  Home Health nurse to assess wound and perform wound care on Mon, Fri, and PRN. Orders routed to home health    Other orders: Continue po antibiotic Augmentin.      Plan:                Follow up with Dr. Horton in 1 week on Wednesday 01/22/2020

## 2020-01-18 ENCOUNTER — EXTERNAL HOME HEALTH (OUTPATIENT)
Dept: HOME HEALTH SERVICES | Facility: HOSPITAL | Age: 70
End: 2020-01-18

## 2020-01-19 ENCOUNTER — HOSPITAL ENCOUNTER (OUTPATIENT)
Facility: HOSPITAL | Age: 70
Discharge: HOME | End: 2020-01-21
Attending: EMERGENCY MEDICINE | Admitting: EMERGENCY MEDICINE
Payer: MEDICARE

## 2020-01-19 DIAGNOSIS — K63.2 FISTULA OF INTESTINE, EXCLUDING RECTUM AND ANUS: ICD-10-CM

## 2020-01-19 DIAGNOSIS — I10 ESSENTIAL HYPERTENSION: ICD-10-CM

## 2020-01-19 DIAGNOSIS — R10.32 LLQ PAIN: ICD-10-CM

## 2020-01-19 DIAGNOSIS — L02.211 ABSCESS OF ABDOMINAL WALL: ICD-10-CM

## 2020-01-19 DIAGNOSIS — T14.8XXA DRAINAGE FROM WOUND: ICD-10-CM

## 2020-01-19 DIAGNOSIS — K56.609 SMALL BOWEL OBSTRUCTION: ICD-10-CM

## 2020-01-19 DIAGNOSIS — K56.609 SBO (SMALL BOWEL OBSTRUCTION): Primary | ICD-10-CM

## 2020-01-19 DIAGNOSIS — E11.9 TYPE 2 DIABETES MELLITUS WITHOUT COMPLICATION, WITHOUT LONG-TERM CURRENT USE OF INSULIN: ICD-10-CM

## 2020-01-19 DIAGNOSIS — K63.2 COLOCUTANEOUS FISTULA: ICD-10-CM

## 2020-01-19 DIAGNOSIS — Z90.49 HISTORY OF HEMICOLECTOMY: ICD-10-CM

## 2020-01-19 DIAGNOSIS — R10.84 GENERALIZED ABDOMINAL PAIN: ICD-10-CM

## 2020-01-19 DIAGNOSIS — E11.620 TYPE 2 DIABETES MELLITUS WITH DIABETIC DERMATITIS, WITHOUT LONG-TERM CURRENT USE OF INSULIN: ICD-10-CM

## 2020-01-19 DIAGNOSIS — Z93.3 STATUS POST HARTMANN'S PROCEDURE: ICD-10-CM

## 2020-01-19 DIAGNOSIS — L02.211 ABDOMINAL WALL ABSCESS: ICD-10-CM

## 2020-01-19 DIAGNOSIS — K63.2 COLONIC FISTULA: ICD-10-CM

## 2020-01-19 LAB
ALBUMIN SERPL BCP-MCNC: 3.4 G/DL (ref 3.5–5.2)
ALP SERPL-CCNC: 80 U/L (ref 55–135)
ALT SERPL W/O P-5'-P-CCNC: 10 U/L (ref 10–44)
ANION GAP SERPL CALC-SCNC: 13 MMOL/L (ref 8–16)
ANISOCYTOSIS BLD QL SMEAR: SLIGHT
AST SERPL-CCNC: 14 U/L (ref 10–40)
BASOPHILS # BLD AUTO: 0.01 K/UL (ref 0–0.2)
BASOPHILS NFR BLD: 0.1 % (ref 0–1.9)
BILIRUB SERPL-MCNC: 0.2 MG/DL (ref 0.1–1)
BILIRUB UR QL STRIP: NEGATIVE
BUN SERPL-MCNC: 13 MG/DL (ref 8–23)
CALCIUM SERPL-MCNC: 9.8 MG/DL (ref 8.7–10.5)
CHLORIDE SERPL-SCNC: 98 MMOL/L (ref 95–110)
CLARITY UR: CLEAR
CO2 SERPL-SCNC: 26 MMOL/L (ref 23–29)
COLOR UR: YELLOW
CREAT SERPL-MCNC: 1.1 MG/DL (ref 0.5–1.4)
DIFFERENTIAL METHOD: ABNORMAL
EOSINOPHIL # BLD AUTO: 0.1 K/UL (ref 0–0.5)
EOSINOPHIL NFR BLD: 1 % (ref 0–8)
ERYTHROCYTE [DISTWIDTH] IN BLOOD BY AUTOMATED COUNT: 16.8 % (ref 11.5–14.5)
EST. GFR  (AFRICAN AMERICAN): 60 ML/MIN/1.73 M^2
EST. GFR  (NON AFRICAN AMERICAN): 52 ML/MIN/1.73 M^2
GLUCOSE SERPL-MCNC: 149 MG/DL (ref 70–110)
GLUCOSE UR QL STRIP: NEGATIVE
HCT VFR BLD AUTO: 30.7 % (ref 37–48.5)
HGB BLD-MCNC: 9.2 G/DL (ref 12–16)
HGB UR QL STRIP: NEGATIVE
HYPOCHROMIA BLD QL SMEAR: ABNORMAL
KETONES UR QL STRIP: NEGATIVE
LACTATE SERPL-SCNC: 3.2 MMOL/L (ref 0.5–2.2)
LACTATE SERPL-SCNC: 3.6 MMOL/L (ref 0.5–2.2)
LEUKOCYTE ESTERASE UR QL STRIP: NEGATIVE
LIPASE SERPL-CCNC: 30 U/L (ref 4–60)
LYMPHOCYTES # BLD AUTO: 1.5 K/UL (ref 1–4.8)
LYMPHOCYTES NFR BLD: 13.4 % (ref 18–48)
MCH RBC QN AUTO: 21.4 PG (ref 27–31)
MCHC RBC AUTO-ENTMCNC: 30 G/DL (ref 32–36)
MCV RBC AUTO: 71 FL (ref 82–98)
MONOCYTES # BLD AUTO: 0.5 K/UL (ref 0.3–1)
MONOCYTES NFR BLD: 4.2 % (ref 4–15)
NEUTROPHILS # BLD AUTO: 8.8 K/UL (ref 1.8–7.7)
NEUTROPHILS NFR BLD: 81.3 % (ref 38–73)
NITRITE UR QL STRIP: NEGATIVE
PH UR STRIP: >8 [PH] (ref 5–8)
PLATELET # BLD AUTO: 389 K/UL (ref 150–350)
PLATELET BLD QL SMEAR: ABNORMAL
PMV BLD AUTO: 7.8 FL (ref 9.2–12.9)
POLYCHROMASIA BLD QL SMEAR: ABNORMAL
POTASSIUM SERPL-SCNC: 3.7 MMOL/L (ref 3.5–5.1)
PROT SERPL-MCNC: 8.8 G/DL (ref 6–8.4)
PROT UR QL STRIP: ABNORMAL
RBC # BLD AUTO: 4.3 M/UL (ref 4–5.4)
SODIUM SERPL-SCNC: 137 MMOL/L (ref 136–145)
SP GR UR STRIP: 1.01 (ref 1–1.03)
URN SPEC COLLECT METH UR: ABNORMAL
UROBILINOGEN UR STRIP-ACNC: NEGATIVE EU/DL
WBC # BLD AUTO: 10.9 K/UL (ref 3.9–12.7)

## 2020-01-19 PROCEDURE — 96365 THER/PROPH/DIAG IV INF INIT: CPT | Mod: 59

## 2020-01-19 PROCEDURE — 96367 TX/PROPH/DG ADDL SEQ IV INF: CPT

## 2020-01-19 PROCEDURE — G0378 HOSPITAL OBSERVATION PER HR: HCPCS

## 2020-01-19 PROCEDURE — 96361 HYDRATE IV INFUSION ADD-ON: CPT

## 2020-01-19 PROCEDURE — 83690 ASSAY OF LIPASE: CPT

## 2020-01-19 PROCEDURE — 99285 EMERGENCY DEPT VISIT HI MDM: CPT | Mod: 25

## 2020-01-19 PROCEDURE — 63600175 PHARM REV CODE 636 W HCPCS: Performed by: PHYSICIAN ASSISTANT

## 2020-01-19 PROCEDURE — 83605 ASSAY OF LACTIC ACID: CPT

## 2020-01-19 PROCEDURE — 63600175 PHARM REV CODE 636 W HCPCS: Performed by: EMERGENCY MEDICINE

## 2020-01-19 PROCEDURE — 96376 TX/PRO/DX INJ SAME DRUG ADON: CPT

## 2020-01-19 PROCEDURE — 96368 THER/DIAG CONCURRENT INF: CPT

## 2020-01-19 PROCEDURE — 25500020 PHARM REV CODE 255: Performed by: EMERGENCY MEDICINE

## 2020-01-19 PROCEDURE — 25000003 PHARM REV CODE 250: Performed by: EMERGENCY MEDICINE

## 2020-01-19 PROCEDURE — 80053 COMPREHEN METABOLIC PANEL: CPT

## 2020-01-19 PROCEDURE — 83605 ASSAY OF LACTIC ACID: CPT | Mod: 91

## 2020-01-19 PROCEDURE — 85025 COMPLETE CBC W/AUTO DIFF WBC: CPT

## 2020-01-19 PROCEDURE — 96375 TX/PRO/DX INJ NEW DRUG ADDON: CPT

## 2020-01-19 PROCEDURE — 87040 BLOOD CULTURE FOR BACTERIA: CPT | Mod: 59

## 2020-01-19 PROCEDURE — S0030 INJECTION, METRONIDAZOLE: HCPCS | Performed by: EMERGENCY MEDICINE

## 2020-01-19 PROCEDURE — 87040 BLOOD CULTURE FOR BACTERIA: CPT

## 2020-01-19 PROCEDURE — 81003 URINALYSIS AUTO W/O SCOPE: CPT

## 2020-01-19 RX ORDER — METRONIDAZOLE 500 MG/100ML
500 INJECTION, SOLUTION INTRAVENOUS
Status: DISCONTINUED | OUTPATIENT
Start: 2020-01-19 | End: 2020-01-21 | Stop reason: HOSPADM

## 2020-01-19 RX ORDER — SODIUM CHLORIDE, SODIUM LACTATE, POTASSIUM CHLORIDE, CALCIUM CHLORIDE 600; 310; 30; 20 MG/100ML; MG/100ML; MG/100ML; MG/100ML
INJECTION, SOLUTION INTRAVENOUS CONTINUOUS
Status: DISCONTINUED | OUTPATIENT
Start: 2020-01-19 | End: 2020-01-21 | Stop reason: HOSPADM

## 2020-01-19 RX ORDER — MORPHINE SULFATE 4 MG/ML
4 INJECTION, SOLUTION INTRAMUSCULAR; INTRAVENOUS
Status: COMPLETED | OUTPATIENT
Start: 2020-01-19 | End: 2020-01-19

## 2020-01-19 RX ORDER — MORPHINE SULFATE 2 MG/ML
2 INJECTION, SOLUTION INTRAMUSCULAR; INTRAVENOUS EVERY 4 HOURS PRN
Status: DISCONTINUED | OUTPATIENT
Start: 2020-01-19 | End: 2020-01-19

## 2020-01-19 RX ORDER — SODIUM CHLORIDE 0.9 % (FLUSH) 0.9 %
10 SYRINGE (ML) INJECTION
Status: DISCONTINUED | OUTPATIENT
Start: 2020-01-19 | End: 2020-01-21 | Stop reason: HOSPADM

## 2020-01-19 RX ORDER — ONDANSETRON 2 MG/ML
4 INJECTION INTRAMUSCULAR; INTRAVENOUS
Status: COMPLETED | OUTPATIENT
Start: 2020-01-19 | End: 2020-01-19

## 2020-01-19 RX ORDER — MORPHINE SULFATE 2 MG/ML
2 INJECTION, SOLUTION INTRAMUSCULAR; INTRAVENOUS
Status: DISCONTINUED | OUTPATIENT
Start: 2020-01-19 | End: 2020-01-21 | Stop reason: HOSPADM

## 2020-01-19 RX ORDER — MORPHINE SULFATE 2 MG/ML
2 INJECTION, SOLUTION INTRAMUSCULAR; INTRAVENOUS
Status: DISCONTINUED | OUTPATIENT
Start: 2020-01-19 | End: 2020-01-19

## 2020-01-19 RX ORDER — ONDANSETRON 2 MG/ML
4 INJECTION INTRAMUSCULAR; INTRAVENOUS EVERY 8 HOURS PRN
Status: DISCONTINUED | OUTPATIENT
Start: 2020-01-19 | End: 2020-01-21 | Stop reason: HOSPADM

## 2020-01-19 RX ADMIN — SODIUM CHLORIDE, POTASSIUM CHLORIDE, SODIUM LACTATE AND CALCIUM CHLORIDE: 600; 310; 30; 20 INJECTION, SOLUTION INTRAVENOUS at 06:01

## 2020-01-19 RX ADMIN — SODIUM CHLORIDE 1000 ML: 0.9 INJECTION, SOLUTION INTRAVENOUS at 02:01

## 2020-01-19 RX ADMIN — ONDANSETRON 4 MG: 2 INJECTION INTRAMUSCULAR; INTRAVENOUS at 01:01

## 2020-01-19 RX ADMIN — PROMETHAZINE HYDROCHLORIDE 12.5 MG: 25 INJECTION INTRAMUSCULAR; INTRAVENOUS at 05:01

## 2020-01-19 RX ADMIN — IOHEXOL 75 ML: 350 INJECTION, SOLUTION INTRAVENOUS at 02:01

## 2020-01-19 RX ADMIN — METRONIDAZOLE 500 MG: 500 INJECTION, SOLUTION INTRAVENOUS at 04:01

## 2020-01-19 RX ADMIN — MORPHINE SULFATE 4 MG: 4 INJECTION INTRAVENOUS at 01:01

## 2020-01-19 RX ADMIN — SODIUM CHLORIDE 1000 ML: 0.9 INJECTION, SOLUTION INTRAVENOUS at 08:01

## 2020-01-19 RX ADMIN — SODIUM CHLORIDE 1000 ML: 0.9 INJECTION, SOLUTION INTRAVENOUS at 01:01

## 2020-01-19 RX ADMIN — ONDANSETRON 4 MG: 2 INJECTION INTRAMUSCULAR; INTRAVENOUS at 04:01

## 2020-01-19 RX ADMIN — CEFTRIAXONE 1 G: 1 INJECTION, SOLUTION INTRAVENOUS at 11:01

## 2020-01-19 RX ADMIN — MORPHINE SULFATE 2 MG: 2 INJECTION, SOLUTION INTRAMUSCULAR; INTRAVENOUS at 05:01

## 2020-01-19 RX ADMIN — MORPHINE SULFATE 2 MG: 2 INJECTION, SOLUTION INTRAMUSCULAR; INTRAVENOUS at 04:01

## 2020-01-19 NOTE — ED NOTES
Pt continuing to vomit at this time. Very loudly, moaning and groaning, dry heaves, yelling out after each heave. Dr. Sanchez informed.

## 2020-01-19 NOTE — ED NOTES
APPEARANCE: Alert, oriented and in no acute distress.  CARDIAC: Normal rate and rhythm, no murmur heard. Elevated BP  PERIPHERAL VASCULAR: peripheral pulses present. Normal cap refill. No edema. Warm to touch.    RESPIRATORY:Normal rate and effort, breath sounds clear bilaterally throughout chest. Respirations are equal and unlabored no obvious signs of distress.  GASTRO: soft, bowel sounds hypoactive, Tenderness to mid abdomen. +Vomiting.   MUSC: Limited ROM due to weakness. No bony tenderness or soft tissue tenderness. No obvious deformity.  SKIN: Skin is warm and dry, normal skin turgor, mucous membranes moist. Wounds noted to abdomen x 2- dressed on arrival.   NEURO: 5/5 strength major flexors/extensors bilaterally. Sensory intact to light touch bilaterally. [place]inton coma scale: eyes open spontaneously-4, oriented & converses-5, obeys commands-6. No neurological abnormalities.   MENTAL STATUS: awake, alert and aware of environment.  EYE: PERRL, both eyes: pupils brisk and reactive to light. Normal size.  ENT: EARS: no obvious drainage. NOSE: no active bleeding.   Pt reports abdominal pain and nausea/vomiting that started today.

## 2020-01-19 NOTE — ED PROVIDER NOTES
Encounter Date: 2020       History     Chief Complaint   Patient presents with    Abdominal Pain     pt complains of n/v and abd pain pt had hx of intra abd abscess on      HPI     Pt is a 69 y.o. female w/h/o multiple abdominal surgeries, who presents for abdominal pain.    Duration: 1d, gradual onset  Quality: achey, pressure  Severity: severe  Location: diffuse  Radiation: no  Improved by: nothing  Worsened by: nothing  Associated with: nausea, NBNB emesis TNTC    Reports last BM this morning and normal, no blood, no diarrhea. No change in urine. No vaginal dc. No rash. Denies fever, chills, cough, SOB, CP, trauma. No change in drainage from either incision site.      Review of patient's allergies indicates:   Allergen Reactions    Chlorhexidine gluconate Rash     Chlorhexidine/alcohol wipes. Rash and blistering.     Past Medical History:   Diagnosis Date    Diabetes mellitus     Diabetes mellitus, type 2     Hypertension      Past Surgical History:   Procedure Laterality Date     SECTION, CLASSIC      x2    CHOLECYSTECTOMY      COLON SURGERY      [facility]    COLONOSCOPY N/A 2018    Procedure: COLONOSCOPY;  Surgeon: Gibran Aguayo MD;  Location: [facility];  Service: Endoscopy;  Laterality: N/A;    COLOSTOMY Left     CYSTOSCOPY WITH URETEROSCOPY, RETROGRADE PYELOGRAPHY, AND INSERTION OF STENT Left 2019    Procedure: CYSTOSCOPY, WITH RETROGRADE PYELOGRAM AND URETERAL STENT INSERTION with placement of a muir cath;  Surgeon: Ulisses Horton MD;  Location: [facility] OR;  Service: General;  Laterality: Left;    INCISION AND DRAINAGE OF ABSCESS N/A 2019    Procedure: INCISION AND DRAINAGE, ABSCESS;  Surgeon: Ulisses Horton MD;  Location: [facility] OR;  Service: General;  Laterality: N/A;    INCISION OF ABDOMINAL WALL N/A 8/10/2018    Procedure: INCISION, ABDOMINAL WALL;  Surgeon: Ulisses Horton MD;  Location: [facility] OR;  Service: General;  Laterality:  N/A;    INCISIONAL HERNIA REPAIR Right 2010     No family history on file.  Social History     Tobacco Use    Smoking status: Never Smoker    Smokeless tobacco: Never Used   Substance Use Topics    Alcohol use: No    Drug use: No     Review of Systems     General: No fever.  No chills.  Eyes: No visual changes.  Head: No headache.    Integument: No rashes or lesions.  Chest: No shortness of breath.  Cardiovascular: No chest pain.  Abdomen: +abdominal pain.  + nausea or vomiting.  Urinary: No abnormal urination.  Neurologic: No focal weakness.  No numbness.  Hematologic: No easy bruising.  Endocrine: No excessive thirst or urination.       Physical Exam     Initial Vitals [01/19/20 1159]   BP Pulse Resp Temp SpO2   (!) 189/83 87 18 98.4 °F (36.9 °C) 100 %      MAP       --         Physical Exam    Appearance: Moderate acute distress.  HEENT: Normocephalic. Atraumatic. No conjunctival injection. EOMI. PERRL   Neck: Neck supple.    Chest: Non-tender. Clear to auscultation bilaterally.  Good air movement.  No wheezes.  No rhonchi.  Cardiovascular: Regular rate and rhythm. No murmurs. No gallops. No rubs. +2 radial/DP/DT pulses bilaterally.  Abdomen: Soft. Not distended. Nontender. No guarding. No rebound. No Masses. Incision sites c/di  Musculoskeletal: Good range of motion all joints. No deformities.    Neurologic: Alert and oriented x 3.  Equal strength in upper and lower extremities bilaterally. Normal sensation.   Psych:  Appropriate, conversant.    Integumentary: No rashes seen.  Good turgor.  No abrasions.  No ecchymoses.      ED Course   Procedures  Labs Reviewed   CBC W/ AUTO DIFFERENTIAL - Abnormal; Notable for the following components:       Result Value    Hemoglobin 9.2 (*)     Hematocrit 30.7 (*)     Mean Corpuscular Volume 71 (*)     Mean Corpuscular Hemoglobin 21.4 (*)     Mean Corpuscular Hemoglobin Conc 30.0 (*)     RDW 16.8 (*)     Platelets 389 (*)     MPV 7.8 (*)     Gran # (ANC) 8.8 (*)      Gran% 81.3 (*)     Lymph% 13.4 (*)     Platelet Estimate Increased (*)     All other components within normal limits   COMPREHENSIVE METABOLIC PANEL - Abnormal; Notable for the following components:    Glucose 149 (*)     Total Protein 8.8 (*)     Albumin 3.4 (*)     eGFR if non  52 (*)     All other components within normal limits   LACTIC ACID, PLASMA - Abnormal; Notable for the following components:    Lactate (Lactic Acid) 3.2 (*)     All other components within normal limits   URINALYSIS, REFLEX TO URINE CULTURE - Abnormal; Notable for the following components:    pH, UA >8.0 (*)     Protein, UA Trace (*)     All other components within normal limits    Narrative:     Preferred Collection Type->Urine, Clean Catch   LACTIC ACID, PLASMA - Abnormal; Notable for the following components:    Lactate (Lactic Acid) 3.6 (*)     All other components within normal limits    Narrative:      Lactic acid critical result(s) called and verbal readback obtained   from   Kimberley Roman RN. by [facility] 01/19/2020 18:54   CULTURE, BLOOD   CULTURE, BLOOD   LIPASE          Imaging Results          CT Abdomen Pelvis With Contrast (Final result)  Result time 01/19/20 14:37:11    Final result by Yolanda Patton MD (01/19/20 14:37:11)                 Impression:      Complicated anatomy with postsurgical changes of the bowel identified.  Study difficult to fully evaluate given the lack of oral contrast material.  Again seen are matted loops of small bowel in the mid abdomen at the level of the umbilicus with a collection of intervening extraluminal gas in fluid with continued suspected fistulization to the level of the umbilicus and left lateral abdominal wall.  No drainable fluid collection seen to suggest abscess formation.  The small bowel and stomach proximal to the level of the matted bowel in the lower abdomen is slightly dilated and fluid-filled raising the possibility for a developing partial small bowel  obstruction.    Findings were discussed with Emilia Sanchez at 14:37 on1/19/2020.      Electronically signed by: Yolanda Patton MD  Date:    01/19/2020  Time:    14:37             Narrative:    EXAMINATION:  CT ABDOMEN PELVIS WITH CONTRAST    CLINICAL HISTORY:  Abd pain, fever, abscess suspected;    TECHNIQUE:  Low dose axial images, sagittal and coronal reformations were obtained from the lung bases to the pubic symphysis following the IV administration of 75 mL of Omnipaque 350 .  Oral contrast was not given.    COMPARISON:  12/19/2019    FINDINGS:  Bibasilar subsegmental atelectasis/scarring.  The base of the heart appears normal.  Calcified coronary artery disease is noted.  Calcified atheromatous disease affects the aorta and its branch vessels.    The gallbladder has been removed.  Mild intrahepatic biliary ductal dilatation, unchanged.  The liver, spleen, pancreas and adrenal glands are unremarkable.  Both kidneys are normal in size, shape and contour.  Left-sided renal cysts are seen.  No hydronephrosis or hydroureter.  The urinary bladder is partially distended and appears normal.  Uterus and adnexal regions grossly within normal limits.    The evaluation of the bowel is limited given the lack of oral contrast material.  Postoperative changes of bowel surgery are identified with presumed partial colectomy with suture line seen in the right paramidline abdomen and in the lower abdomen presumably about the sigmoid colon.  The stomach is distended with fluid.  There is fluid distention of small bowel loops throughout the abdomen which become clustered within the mid abdomen at the level of the umbilicus where again noted is an ill-defined collection of air extending between the matted small bowel loops.  This collection of air appears to extend somewhat anteriorly toward the umbilicus where an enterocutaneous fistula is suspected and also partially extends towards the left abdomen were another  enterocutaneous fistula is visualized.  This collection appears to likely arising at the level of the lower sigmoid anastomotic suture.  There does appear to be a component of small bowel obstruction present with the significant dilatation of proximal small bowel loops with fecalization of contents within some small bowel loops noted.  Additionally, there are some decompressed bowel loops seen more distally.    Age-appropriate degenerative changes affect the skeleton.                                                                 Clinical Impression:       ICD-10-CM ICD-9-CM   1. SBO (small bowel obstruction) K56.609 560.9            70 yo female w/h/o multiple abdominal surgeries presents for abdominal pain, n/v. Exam shows VSS, afeb. Pt in moderate distress 2/2 pain. Labs with LA 3.2, otherwise reassuring. Gave 1L IVF and ordered CT ABD that showed possible early SBO. Discussed case with surgeon, Dr. Horton, who recommends rocephin, flagyl, and mIVF. Ordered 2nd LA that showed 3.6. Ordered 2nd liter bolus. Reassessed pt who appears comfortable, with normal VS. Re-discussed with Dr. Horton who advised to continue mIVF and check in morning. Ordered basic morning labs. Admitted to obs.                  Emilia Sanchez MD  01/19/20 2040

## 2020-01-20 LAB
ABO + RH BLD: NORMAL
ALBUMIN SERPL BCP-MCNC: 2.6 G/DL (ref 3.5–5.2)
ALP SERPL-CCNC: 64 U/L (ref 55–135)
ALT SERPL W/O P-5'-P-CCNC: 7 U/L (ref 10–44)
ANION GAP SERPL CALC-SCNC: 9 MMOL/L (ref 8–16)
ANISOCYTOSIS BLD QL SMEAR: SLIGHT
AST SERPL-CCNC: 11 U/L (ref 10–40)
BASOPHILS # BLD AUTO: 0.01 K/UL (ref 0–0.2)
BASOPHILS NFR BLD: 0.1 % (ref 0–1.9)
BILIRUB SERPL-MCNC: 0.3 MG/DL (ref 0.1–1)
BLD GP AB SCN CELLS X3 SERPL QL: NORMAL
BUN SERPL-MCNC: 13 MG/DL (ref 8–23)
CALCIUM SERPL-MCNC: 8.4 MG/DL (ref 8.7–10.5)
CHLORIDE SERPL-SCNC: 103 MMOL/L (ref 95–110)
CO2 SERPL-SCNC: 26 MMOL/L (ref 23–29)
CREAT SERPL-MCNC: 0.9 MG/DL (ref 0.5–1.4)
DIFFERENTIAL METHOD: ABNORMAL
EOSINOPHIL # BLD AUTO: 0.1 K/UL (ref 0–0.5)
EOSINOPHIL NFR BLD: 1.8 % (ref 0–8)
ERYTHROCYTE [DISTWIDTH] IN BLOOD BY AUTOMATED COUNT: 16.9 % (ref 11.5–14.5)
EST. GFR  (AFRICAN AMERICAN): >60 ML/MIN/1.73 M^2
EST. GFR  (NON AFRICAN AMERICAN): >60 ML/MIN/1.73 M^2
GLUCOSE SERPL-MCNC: 108 MG/DL (ref 70–110)
HCT VFR BLD AUTO: 25.6 % (ref 37–48.5)
HGB BLD-MCNC: 7.6 G/DL (ref 12–16)
HYPOCHROMIA BLD QL SMEAR: ABNORMAL
LACTATE SERPL-SCNC: 1.5 MMOL/L (ref 0.5–2.2)
LYMPHOCYTES # BLD AUTO: 1.9 K/UL (ref 1–4.8)
LYMPHOCYTES NFR BLD: 26.1 % (ref 18–48)
MCH RBC QN AUTO: 21.2 PG (ref 27–31)
MCHC RBC AUTO-ENTMCNC: 29.7 G/DL (ref 32–36)
MCV RBC AUTO: 72 FL (ref 82–98)
MONOCYTES # BLD AUTO: 0.5 K/UL (ref 0.3–1)
MONOCYTES NFR BLD: 7 % (ref 4–15)
NEUTROPHILS # BLD AUTO: 4.6 K/UL (ref 1.8–7.7)
NEUTROPHILS NFR BLD: 65 % (ref 38–73)
PLATELET # BLD AUTO: 307 K/UL (ref 150–350)
PMV BLD AUTO: 7.8 FL (ref 9.2–12.9)
POTASSIUM SERPL-SCNC: 3.4 MMOL/L (ref 3.5–5.1)
PROT SERPL-MCNC: 6.8 G/DL (ref 6–8.4)
RBC # BLD AUTO: 3.58 M/UL (ref 4–5.4)
SODIUM SERPL-SCNC: 138 MMOL/L (ref 136–145)
WBC # BLD AUTO: 7.12 K/UL (ref 3.9–12.7)

## 2020-01-20 PROCEDURE — G0378 HOSPITAL OBSERVATION PER HR: HCPCS

## 2020-01-20 PROCEDURE — 63600175 PHARM REV CODE 636 W HCPCS: Performed by: EMERGENCY MEDICINE

## 2020-01-20 PROCEDURE — 25000003 PHARM REV CODE 250: Performed by: EMERGENCY MEDICINE

## 2020-01-20 PROCEDURE — S0030 INJECTION, METRONIDAZOLE: HCPCS | Performed by: EMERGENCY MEDICINE

## 2020-01-20 PROCEDURE — 80053 COMPREHEN METABOLIC PANEL: CPT

## 2020-01-20 PROCEDURE — 94761 N-INVAS EAR/PLS OXIMETRY MLT: CPT

## 2020-01-20 PROCEDURE — 85025 COMPLETE CBC W/AUTO DIFF WBC: CPT

## 2020-01-20 PROCEDURE — 83605 ASSAY OF LACTIC ACID: CPT

## 2020-01-20 PROCEDURE — 86901 BLOOD TYPING SEROLOGIC RH(D): CPT

## 2020-01-20 PROCEDURE — 96376 TX/PRO/DX INJ SAME DRUG ADON: CPT

## 2020-01-20 PROCEDURE — 96366 THER/PROPH/DIAG IV INF ADDON: CPT

## 2020-01-20 PROCEDURE — 96361 HYDRATE IV INFUSION ADD-ON: CPT

## 2020-01-20 RX ADMIN — METRONIDAZOLE 500 MG: 500 INJECTION, SOLUTION INTRAVENOUS at 09:01

## 2020-01-20 RX ADMIN — METRONIDAZOLE 500 MG: 500 INJECTION, SOLUTION INTRAVENOUS at 05:01

## 2020-01-20 RX ADMIN — SODIUM CHLORIDE, POTASSIUM CHLORIDE, SODIUM LACTATE AND CALCIUM CHLORIDE: 600; 310; 30; 20 INJECTION, SOLUTION INTRAVENOUS at 06:01

## 2020-01-20 RX ADMIN — CEFTRIAXONE 1 G: 1 INJECTION, SOLUTION INTRAVENOUS at 11:01

## 2020-01-20 RX ADMIN — METRONIDAZOLE 500 MG: 500 INJECTION, SOLUTION INTRAVENOUS at 01:01

## 2020-01-20 RX ADMIN — SODIUM CHLORIDE, POTASSIUM CHLORIDE, SODIUM LACTATE AND CALCIUM CHLORIDE: 600; 310; 30; 20 INJECTION, SOLUTION INTRAVENOUS at 05:01

## 2020-01-20 NOTE — PLAN OF CARE
VN called into patients room to begin admit. Patient speak primarily Slovak and would like the  for the admit question. She only understands and speaks minimal English.

## 2020-01-20 NOTE — ED NOTES
Attempted to call report to MARIANA Burt. Nurse was in another patient room and unable to take report at this time. Awaiting return call from RN to give report and transfer pt.

## 2020-01-20 NOTE — ED NOTES
Pt is resting on stretcher, no longer vomiting. Pt reports that she feels better. VS stable. Will continue to monitor.

## 2020-01-20 NOTE — H&P
Chief Complaint   Patient presents with    Abdominal Pain       pt complains of n/v and abd pain pt had hx of intra abd abscess on       HPI      Pt is a 69 y.o. female w/h/o multiple abdominal surgeries, who presents for abdominal pain.     Duration: 1d, gradual onset  Quality: achey, pressure  Severity: severe  Location: diffuse  Radiation: no  Improved by: nothing  Worsened by: nothing  Associated with: nausea, NBNB emesis TNTC     Reports last BM this morning and normal, no blood, no diarrhea. No change in urine. No vaginal dc. No rash. Denies fever, chills, cough, SOB, CP, trauma. No change in drainage from either incision site.              Review of patient's allergies indicates:   Allergen Reactions    Chlorhexidine gluconate Rash       Chlorhexidine/alcohol wipes. Rash and blistering.           Past Medical History:   Diagnosis Date    Diabetes mellitus      Diabetes mellitus, type 2      Hypertension              Past Surgical History:   Procedure Laterality Date     SECTION, CLASSIC         x2    CHOLECYSTECTOMY       COLON SURGERY        [facility] [place]    COLONOSCOPY N/A 2018     Procedure: COLONOSCOPY;  Surgeon: Gibran Aguayo MD;  Location: [facility];  Service: Endoscopy;  Laterality: N/A;    COLOSTOMY Left     CYSTOSCOPY WITH URETEROSCOPY, RETROGRADE PYELOGRAPHY, AND INSERTION OF STENT Left 2019     Procedure: CYSTOSCOPY, WITH RETROGRADE PYELOGRAM AND URETERAL STENT INSERTION with placement of a muir cath;  Surgeon: Ulisses Horton MD;  Location: [facility];  Service: General;  Laterality: Left;    INCISION AND DRAINAGE OF ABSCESS N/A 2019     Procedure: INCISION AND DRAINAGE, ABSCESS;  Surgeon: Ulisses Horton MD;  Location: [facility] OR;  Service: General;  Laterality: N/A;    INCISION OF ABDOMINAL WALL N/A 8/10/2018     Procedure: INCISION, ABDOMINAL WALL;  Surgeon: Ulisses Horton MD;  Location: [facility] OR;  Service: General;  Laterality: N/A;     INCISIONAL HERNIA REPAIR Right 2010      No family history on file.  Social History           Tobacco Use    Smoking status: Never Smoker    Smokeless tobacco: Never Used   Substance Use Topics    Alcohol use: No    Drug use: No      Review of Systems      General: No fever.  No chills.  Eyes: No visual changes.  Head: No headache.    Integument: No rashes or lesions.  Chest: No shortness of breath.  Cardiovascular: No chest pain.  Abdomen: +abdominal pain.  + nausea or vomiting.  Urinary: No abnormal urination.  Neurologic: No focal weakness.  No numbness.  Hematologic: No easy bruising.  Endocrine: No excessive thirst or urination.        Physical Exam      Initial Vitals [01/19/20 1159]   BP Pulse Resp Temp SpO2   (!) 189/83 87 18 98.4 °F (36.9 °C) 100 %       MAP           --              Physical Exam     Appearance: Moderate acute distress.  HEENT: Normocephalic. Atraumatic. No conjunctival injection. EOMI. PERRL   Neck: Neck supple.    Chest: Non-tender. Clear to auscultation bilaterally.  Good air movement.  No wheezes.  No rhonchi.  Cardiovascular: Regular rate and rhythm. No murmurs. No gallops. No rubs. +2 radial/DP/DT pulses bilaterally.  Abdomen: Soft. Not distended. Nontender. No guarding. No rebound. No Masses. Incision sites c/di  Musculoskeletal: Good range of motion all joints. No deformities.    Neurologic: Alert and oriented x 3.  Equal strength in upper and lower extremities bilaterally. Normal sensation.   Psych:  Appropriate, conversant.    Integumentary: No rashes seen.  Good turgor.  No abrasions.  No ecchymoses.        Imp: Abdominal pain, partial bowel obstruction, dm'  Htn,  [place] cutaneous fistula    Plan: npo iv antibiotics , follow lactate

## 2020-01-21 VITALS
RESPIRATION RATE: 18 BRPM | WEIGHT: 162.94 LBS | HEIGHT: 62 IN | OXYGEN SATURATION: 96 % | TEMPERATURE: 98 F | DIASTOLIC BLOOD PRESSURE: 77 MMHG | BODY MASS INDEX: 29.98 KG/M2 | HEART RATE: 79 BPM | SYSTOLIC BLOOD PRESSURE: 136 MMHG

## 2020-01-21 LAB — FUNGUS SPEC CULT: NORMAL

## 2020-01-21 PROCEDURE — 96376 TX/PRO/DX INJ SAME DRUG ADON: CPT

## 2020-01-21 PROCEDURE — 90662 IIV NO PRSV INCREASED AG IM: CPT | Performed by: SURGERY

## 2020-01-21 PROCEDURE — G0008 ADMIN INFLUENZA VIRUS VAC: HCPCS | Performed by: SURGERY

## 2020-01-21 PROCEDURE — 63600175 PHARM REV CODE 636 W HCPCS: Performed by: EMERGENCY MEDICINE

## 2020-01-21 PROCEDURE — 25000003 PHARM REV CODE 250: Performed by: EMERGENCY MEDICINE

## 2020-01-21 PROCEDURE — G0378 HOSPITAL OBSERVATION PER HR: HCPCS

## 2020-01-21 PROCEDURE — 96374 THER/PROPH/DIAG INJ IV PUSH: CPT | Mod: 59

## 2020-01-21 PROCEDURE — S0030 INJECTION, METRONIDAZOLE: HCPCS | Performed by: EMERGENCY MEDICINE

## 2020-01-21 PROCEDURE — 63600175 PHARM REV CODE 636 W HCPCS: Performed by: SURGERY

## 2020-01-21 PROCEDURE — 90471 IMMUNIZATION ADMIN: CPT | Performed by: SURGERY

## 2020-01-21 PROCEDURE — 94761 N-INVAS EAR/PLS OXIMETRY MLT: CPT

## 2020-01-21 RX ADMIN — INFLUENZA A VIRUS A/MICHIGAN/45/2015 X-275 (H1N1) ANTIGEN (FORMALDEHYDE INACTIVATED), INFLUENZA A VIRUS A/SINGAPORE/INFIMH-16-0019/2016 IVR-186 (H3N2) ANTIGEN (FORMALDEHYDE INACTIVATED), AND INFLUENZA B VIRUS B/MARYLAND/15/2016 BX-69A (A B/COLORADO/6/2017-LIKE VIRUS) ANTIGEN (FORMALDEHYDE INACTIVATED) 0.5 ML: 60; 60; 60 INJECTION, SUSPENSION INTRAMUSCULAR at 06:01

## 2020-01-21 RX ADMIN — METRONIDAZOLE 500 MG: 500 INJECTION, SOLUTION INTRAVENOUS at 05:01

## 2020-01-21 RX ADMIN — METRONIDAZOLE 500 MG: 500 INJECTION, SOLUTION INTRAVENOUS at 09:01

## 2020-01-21 RX ADMIN — MORPHINE SULFATE 2 MG: 2 INJECTION, SOLUTION INTRAMUSCULAR; INTRAVENOUS at 06:01

## 2020-01-21 RX ADMIN — METRONIDAZOLE 500 MG: 500 INJECTION, SOLUTION INTRAVENOUS at 12:01

## 2020-01-21 RX ADMIN — MORPHINE SULFATE 2 MG: 2 INJECTION, SOLUTION INTRAMUSCULAR; INTRAVENOUS at 09:01

## 2020-01-21 NOTE — PROGRESS NOTES
"Surgery follow up  BP (!) 148/74 (Patient Position: Lying)   Pulse 60   Temp 97.8 °F (36.6 °C) (Oral)   Resp 18   Ht 5' 2" (1.575 m)   Wt 79.3 kg (174 lb 13.2 oz)   SpO2 98%   Breastfeeding? No   BMI 31.98 kg/m²   I/O last 3 completed shifts:  In: 4060.4 [I.V.:2610.4; IV Piggyback:1450]  Out: 600 [Urine:600]  No intake/output data recorded.  Recent Results (from the past 336 hour(s))   CBC auto differential    Collection Time: 01/20/20  7:11 AM   Result Value Ref Range    WBC 7.12 3.90 - 12.70 K/uL    Hemoglobin 7.6 (L) 12.0 - 16.0 g/dL    Hematocrit 25.6 (L) 37.0 - 48.5 %    Platelets 307 150 - 350 K/uL   CBC auto differential    Collection Time: 01/19/20  1:19 PM   Result Value Ref Range    WBC 10.90 3.90 - 12.70 K/uL    Hemoglobin 9.2 (L) 12.0 - 16.0 g/dL    Hematocrit 30.7 (L) 37.0 - 48.5 %    Platelets 389 (H) 150 - 350 K/uL     No results found for this or any previous visit (from the past 336 hour(s)).  Abdomen soft non tender , no nausea or vomiting , moving bowels     Plan: start full liquid diet  "

## 2020-01-21 NOTE — PROGRESS NOTES
"Surgery follow up  /77   Pulse 79   Temp 98.3 °F (36.8 °C)   Resp 18   Ht 5' 2" (1.575 m)   Wt 73.9 kg (162 lb 14.7 oz)   SpO2 96%   Breastfeeding? No   BMI 29.80 kg/m²   I/O last 3 completed shifts:  In: 3960.4 [I.V.:2610.4; IV Piggyback:1350]  Out: 1300 [Urine:1300]  No intake/output data recorded.  Recent Results (from the past 336 hour(s))   CBC auto differential    Collection Time: 01/20/20  7:11 AM   Result Value Ref Range    WBC 7.12 3.90 - 12.70 K/uL    Hemoglobin 7.6 (L) 12.0 - 16.0 g/dL    Hematocrit 25.6 (L) 37.0 - 48.5 %    Platelets 307 150 - 350 K/uL   CBC auto differential    Collection Time: 01/19/20  1:19 PM   Result Value Ref Range    WBC 10.90 3.90 - 12.70 K/uL    Hemoglobin 9.2 (L) 12.0 - 16.0 g/dL    Hematocrit 30.7 (L) 37.0 - 48.5 %    Platelets 389 (H) 150 - 350 K/uL     No results found for this or any previous visit (from the past 336 hour(s)).   Doing better , no nausea or vomting moving bowels   Discharge home today will follow in wound center.  "

## 2020-01-21 NOTE — PLAN OF CARE
VN rounds: VN called into pt's room and turned dre with pt's permission. Pt resting in bed. Patients IV was beeping. Notified patients nurse. VN instructed to call for assistance. Pt aware and agreeable. No acute distress noted. Patient denies pain. Allowed time for questions. Will continue to be available and intervene as needed.

## 2020-01-21 NOTE — PLAN OF CARE
Problem: Wound  Goal: Optimal Wound Healing  Outcome: Ongoing, Progressing     Problem: Adult Inpatient Plan of Care  Goal: Plan of Care Review  Outcome: Ongoing, Progressing  Flowsheets (Taken 1/21/2020 0143)  Plan of Care Reviewed With: patient  Goal: Patient-Specific Goal (Individualization)  Outcome: Ongoing, Progressing  Goal: Absence of Hospital-Acquired Illness or Injury  Outcome: Ongoing, Progressing  Goal: Optimal Comfort and Wellbeing  Outcome: Ongoing, Progressing  Goal: Readiness for Transition of Care  Outcome: Ongoing, Progressing  Goal: Rounds/Family Conference  Outcome: Ongoing, Progressing     Problem: Fall Injury Risk  Goal: Absence of Fall and Fall-Related Injury  Outcome: Ongoing, Progressing     Problem: Diabetes Comorbidity  Goal: Blood Glucose Level Within Desired Range  Outcome: Ongoing, Progressing     Problem: Hypertension Comorbidity  Goal: Blood Pressure in Desired Range  Outcome: Ongoing, Progressing

## 2020-01-21 NOTE — PLAN OF CARE
TN met patient at bedside. Currently, patient lives at home with her Son, Andry [phone] and daughter in law. Patient is  Independent of ADL's, no DME  Noted. Patient is receiving home health services from [facility]. Upon discharge, patient states that her son will provide transportation home. Patient's son and daughter in law will be available to help at home as needed, per patient. Patient's PCP is Dr. Sanches.      TN  updated whiteboard with contact information. Blue discharge folder and discharge brochure given to patient. TN instructed patient to contact TN if she has any further questions or concerns. TN will continue to follow.         01/21/20 9371   Discharge Assessment   Assessment Type Discharge Planning Assessment   Confirmed/corrected address and phone number on facesheet? Yes   Assessment information obtained from? Patient;Medical Record   Prior to hospitilization cognitive status: Alert/Oriented   Prior to hospitalization functional status: Independent   Current cognitive status: Alert/Oriented   Current Functional Status: Independent   Lives With child(felicitas), adult  (Son, Andry [phone])   Able to Return to Prior Arrangements yes   Is patient able to care for self after discharge? Yes   Patient's perception of discharge disposition home health  (Patient current with [facility])   Readmission Within the Last 30 Days no previous admission in last 30 days   Patient currently being followed by outpatient case management? No   Patient currently receives any other outside agency services? No   Equipment Currently Used at Home none   Do you have any problems affording any of your prescribed medications? No   Is the patient taking medications as prescribed? yes   Does the patient have transportation home? Yes   Transportation Anticipated family or friend will provide   Does the patient receive services at the [facility]? No   Discharge Plan A Home;Home Health   Discharge Plan B Home with  family;Home Health   DME Needed Upon Discharge  none   Patient/Family in Agreement with Plan yes   Does the patient have transportation to healthcare appointments? Yes

## 2020-01-21 NOTE — PLAN OF CARE
Received pt from ED. Pt is A+Ox4, primarily Gabonese speaking. Pt is NPO, IVF and IV abx per MAR. Pt denies pain, n/v. BM x1 this PM. Ambulating in room well. Dressing to midline ulceration changed. Safety precautions maintained.

## 2020-01-22 NOTE — DISCHARGE SUMMARY
[facility]  General Surgery  Discharge Summary      Patient Name: Azucena Morrison  MRN: [id number]  Admission Date: 1/19/2020  Hospital Length of Stay: 0 days  Discharge Date and Time: 1/21/2020  6:35 PM  Attending Physician: Molly att. providers found   Discharging Provider: Ulisses Horton MD  Primary Care Provider: Pj Sanches MD     HPI: this 61-year-old female known diabetic, known to me from the [facility] was admitted to the emergency room with you and left lower quadrant pain associated nausea and vomiting patient denied any fever last bowel movement was 1 day ago patient denied any hematemesis or melena denied any urinary complaints patient known diabetic on multiple medications including antibiotics    Patient had surgery in the past including colon resection and Aiyana procedure takedown colostomy colostomy closure patient known to have clot of colo cutaneous fistula and is being treated in the outpatient setting as an outpatient     Hospital Course:  Patient admitted with a diagnosis of partial small bowel obstruction which was confirmed CT scan did not show any abscesses patient was treated with IV antibiotics and NPO status patient was then started on liquid diet and advanced to a diabetic diet patient did well was out of bed ambulating no fever or normal patient was discharged home on p.o. pain pill and p.o. antibiotics advised to see me in the office in 1 week on Wednesdays in the wound center    Consults:     Significant Diagnostic Studies: Labs:   BMP: No results for input(s): GLU, NA, K, CL, CO2, BUN, CREATININE, CALCIUM, MG in the last 48 hours., CMP No results for input(s): NA, K, CL, CO2, GLU, BUN, CREATININE, CALCIUM, PROT, ALBUMIN, BILITOT, ALKPHOS, AST, ALT, ANIONGAP, ESTGFRAFRICA, EGFRNONAA in the last 48 hours., CBC No results for input(s): WBC, HGB, HCT, PLT in the last 48 hours., INR   Lab Results   Component Value Date    INR 1.1 02/22/2019    INR 1.1 11/14/2016  "  , Lipid Panel   Lab Results   Component Value Date    CHOL 291 (H) 06/11/2008    HDL 23 (L) 06/11/2008    LDLCALC Invalid, Trig > 400 06/11/2008    TRIG 1,512 (H) 06/11/2008    CHOLHDL 7.9 (L) 06/11/2008    and A1C:   Recent Labs   Lab 12/22/19  2213   HGBA1C 8.1*     Microbiology:   Blood Culture   Lab Results   Component Value Date    LABBLOO No Growth to date 01/19/2020    LABBLOO No Growth to date 01/19/2020    LABBLOO No Growth to date 01/19/2020     Radiology: X-Ray: CXR: X-Ray Chest 1 View (CXR): No results found for this visit on 01/19/20. and KUB: X-Ray Abdomen AP 1 View (KUB): No results found for this visit on 01/19/20.    Pending Diagnostic Studies:     None        Final Active Diagnoses:    Diagnosis Date Noted POA    PRINCIPAL PROBLEM:  SBO (small bowel obstruction) [K56.609] 01/19/2020 Yes      Problems Resolved During this Admission:      Discharged Condition: good    Disposition: Home or Self Care    Follow Up:    Patient Instructions:      Diet diabetic     Remove dressing in 24 hours     Activity as tolerated     Medications:    Reconciled Home Medications:      Medication List      CONTINUE taking these medications    acetaminophen 325 MG tablet  Commonly known as:  TYLENOL  Take 2 tablets (650 mg total) by mouth every 8 (eight) hours as needed.     BD Ultra-Fine Janine Pen Needle 32 gauge x 5/32" Ndle  Generic drug:  pen needle, diabetic  Use to inject insulin daily     Benadryl Itch Stopping cream  Generic drug:  diphenhydrAMINE-zinc acetate 1-0.1%  APPLY LOCALLY TO AFFECTED AREA AS NEEDED     betamethasone valerate 0.1% 0.1 % Crea  Commonly known as:  VALISONE  Apply around wound as needed daily for itching.     carvedilol 25 MG tablet  Commonly known as:  COREG  [place] tre tableta (25 mg en total) por vía oral diariamente.  (Take 1 tablet (25 mg total) by mouth once daily.)     dicyclomine 10 MG capsule  Commonly known as:  BENTYL  Take 1 capsule by mouth twice a day     hydroCHLOROthiazide 25 " MG tablet  Commonly known as:  HYDRODIURIL  [place] tre tableta (25 mg en total) por vía oral diariamente.  (Take 1 tablet (25 mg total) by mouth once daily.)     HYDROcodone-acetaminophen 5-325 mg per tablet  Commonly known as:  NORCO  TAKE 1 TABLET BY MOUTH EVERY 4 HOURS AS NEEDED     Lantus Solostar U-100 Insulin glargine 100 units/mL (3mL) SubQ pen  Generic drug:  insulin  Inject subcutaneously into the skin 10 units every morning.     metFORMIN 1000 MG tablet  Commonly known as:  GLUCOPHAGE  Take 1 tablet (1,000 mg total) by mouth 2 (two) times daily.     Truetest Test Strips Strp  Generic drug:  blood sugar diagnostic        ASK your doctor about these medications    * amoxicillin-clavulanate 875-125mg 875-125 mg per tablet  Commonly known as:  AUGMENTIN  [place] tre tableta por vía oral 2 veces al día.  (Take 1 tablet by mouth 2 (two) times daily.)     * amoxicillin-clavulanate 875-125mg 875-125 mg per tablet  Commonly known as:  AUGMENTIN  TAKE 1 TABLET BY MOUTH TWICE A DAY     clotrimazole-betamethasone 1-0.05% cream  Commonly known as:  LOTRISONE  Apply topically daily     cyproheptadine 4 mg tablet  Commonly known as:  PERIACTIN  [place] tre tableta (4 mg en total) por vía oral diariamente.  (Take 1 tablet (4 mg total) by mouth once daily.)     metoclopramide HCl 10 MG tablet  Commonly known as:  REGLAN  Take 1 tablet (10 mg total) by mouth every 6 (six) hours as needed (Nausea).     naproxen 500 MG tablet  Commonly known as:  NAPROSYN  [place] tre tableta (500 mg en total) por vía oral 2 veces al día con las comidas.  (Take 1 tablet (500 mg total) by mouth 2 (two) times daily with meals.)     ondansetron 8 MG tablet  Commonly known as:  ZOFRAN  TAKE 1 TABLET BY MOUTH TWICE DAILY     polyethylene glycol 17 gram/dose powder  Commonly known as:  GLYCOLAX  Take 17 g by mouth once daily.     promethazine-codeine 6.25-10 mg/5 ml 6.25-10 mg/5 mL syrup  Commonly known as:  PHENERGAN with CODEINE  Take 5 mLs by mouth 3  (three) times daily.     traMADol 50 mg tablet  Commonly known as:  Ultram  TAKE 1 TABLET BY MOUTH EVERY 6 HOURS.         * This list has 2 medication(s) that are the same as other medications prescribed for you. Read the directions carefully, and ask your doctor or other care provider to review them with you.                Ulisses Horton MD  General Surgery  [facility]

## 2020-01-22 NOTE — PLAN OF CARE
VN reviewed discharge instructions with pt. AVS printed and handed to pt by bedside nurse. Reviewed followup appts, medication, diet, and importance of medication compliance. Reviewed home care instructions, treatment plan, self management and when to seek medical attention. Allowed time for questions, all questions answered. Pt verbalized complete understanding of discharge instructions and voices no concerns.

## 2020-01-24 LAB — BACTERIA BLD CULT: NORMAL

## 2020-01-25 LAB — BACTERIA BLD CULT: NORMAL

## 2020-01-29 ENCOUNTER — HOSPITAL ENCOUNTER (OUTPATIENT)
Dept: WOUND CARE | Facility: HOSPITAL | Age: 70
Discharge: HOME | End: 2020-01-29
Attending: SURGERY
Payer: MEDICARE

## 2020-01-29 VITALS
WEIGHT: 162 LBS | TEMPERATURE: 98 F | HEIGHT: 62 IN | DIASTOLIC BLOOD PRESSURE: 94 MMHG | BODY MASS INDEX: 29.81 KG/M2 | HEART RATE: 89 BPM | SYSTOLIC BLOOD PRESSURE: 167 MMHG

## 2020-01-29 DIAGNOSIS — L02.211 ABDOMINAL WALL ABSCESS: ICD-10-CM

## 2020-01-29 DIAGNOSIS — E11.9 TYPE 2 DIABETES MELLITUS WITHOUT COMPLICATION, WITHOUT LONG-TERM CURRENT USE OF INSULIN: ICD-10-CM

## 2020-01-29 DIAGNOSIS — L02.211 ABSCESS OF ABDOMINAL WALL: ICD-10-CM

## 2020-01-29 DIAGNOSIS — K63.2 COLONIC FISTULA: ICD-10-CM

## 2020-01-29 DIAGNOSIS — L08.9 LOCAL INFECTION OF SKIN AND SUBCUTANEOUS TISSUE: ICD-10-CM

## 2020-01-29 DIAGNOSIS — E11.622 TYPE 2 DIABETES MELLITUS WITH OTHER SKIN ULCER, WITH LONG-TERM CURRENT USE OF INSULIN: Primary | ICD-10-CM

## 2020-01-29 DIAGNOSIS — Z98.890 S/P EXPLORATORY LAPAROTOMY: ICD-10-CM

## 2020-01-29 DIAGNOSIS — Z79.4 TYPE 2 DIABETES MELLITUS WITH OTHER SKIN ULCER, WITH LONG-TERM CURRENT USE OF INSULIN: Primary | ICD-10-CM

## 2020-01-29 DIAGNOSIS — T81.43XA POSTPROCEDURAL INTRAABDOMINAL ABSCESS: ICD-10-CM

## 2020-01-29 PROCEDURE — 17250 CHEM CAUT OF GRANLTJ TISSUE: CPT

## 2020-01-29 NOTE — PROGRESS NOTES
"Subjective:       Patient ID: Azucena Morrison is a 69 y.o. female.    Chief Complaint: Non-healing Wound Follow Up    6/29/17: Pt re-admit for distal abdominal wound. Pt denies any fevers or chills but states she feels nauseous at times. Returned to [place] June 28, from [place], reports much trouble with abdominal wound while in [place].  7/6/17-- Patient scheduled for surgical drainage of wound Tuesday 7/11/17DB  7/19/17-- Patient post op clinic visit.  8/16/17: CT of abdomen and pelvis done 8/9/17 due to suspected fistula  8/23/17-- U/S of abdomen done today.  12/13/17 Wound vac with 20cc drainage in clinic today.  1/10/18: on PO abx  1/24/18: Fistulagram ordered per Dr. Horton. Patient still on PO abx  02/21/18 Patient is not on antibiotics at this time. BS fasting 135 per patient report this am.  03/07/18 Culture of Anterior Abdominal Wound is positive. No antibiotics at this time.  fasting per patient report.    03/21/18 Patient c/o nausea along with abdominal tenderness near abd midline wound. Patient states that pain level is 6 and that she passed two sero-sanguineous clots from wound. Patient is afebrile this am.  3/28/18: patient continue antibiotics and reports that pain is less than last weeks clinic visit. Drainage has decreased as well  04/04/18 Patient states that abdominal pain is "pressure" sensation and that when she pushes down on her abdomen on either side of abdominal wound she feels like she has to urinate. Patient reports pain level of 3 this am.  fasting this am per patient report.   5/2/18: Patient had Abdomina Toribio today before wound care. She had discontinued Bactrim PO per Dr. Horton's recommendation and culture results and is now taking Amoxicillin PO  6/20/18: Pt reports itching to wound and periwound   6/27/18: patient reports no new complaints with regards to her wound or abdomen, wound continues to decrease in size and drainage  8/1/18: Pt reports increased pain to " abdomen with nausea and emesis since thursday 7/26/18, denies any fevers, patient did no go to ER as instructed by home health nurse on Sat. 7/28/18.  8/29/18: Patient admitted to hospital 8/2 for abdominal wound complications. Surgery for abdominal abscess per Dr. Horton on 8/3. Patient placed on IV antibiotics and discharged home on 8/18 with [facility]  and PICC line to LUE. Patient currently on IV ertapenem. IV medication sent to patients home per [facility].   9/5/18: IV Ertapenem to continue 1 week. Culture sent to lab. [facility]  sent orders. PICC line intact to E.  9/12/18 Antibiotics completed. Culture results pending. 1 deep stitch remaining.  9/12/18: Culture positive for E. Coli, patient to continue Ertapenem IV antibiotics x 2weeks. [facility] notified  9/26/18: F/U abdominal wound, wound continues to improve. Patient will complete IV antibiotics today  10/3/18: patient c/o diarrhea for 2 days and nausea with some vomiting. Labs and stool culture ordered. IV antibiotics discontinued today  10/10/2018 patient will start on IV antibiotic Invaz IV once a day, pending PICC line placement, order placed today  for PICC per Dr. Horton.  10/24/18: patient on IV antibiotics, tolerating well. Continue for 1 week  11/7/2018: IV Antibiotic discontinued.  11/8/2018: PICC line to left upper arm discontinued by Home Health.  11/21/2018 F/u for abdominal wall fistula , c/o nausea  No vomiting , no fever  12/5/2018: F/u for abdominal wall fisula, c/o gas, Dr. Horton will order Bentyl. Surgery schedule for January 2019.  12/12/2018: F/u for abdominal wall fistula, c/o abdominal pain. Dr. Horton ordered Norco 5/325mg tab 1 PO every 4 hours as needed for pain and referred patient for x-ray of abdomen after clinic today.  12/19/2018: F/u for abdominal wall fisula, c/o nausea, Dr. Horton re ordered Ondansetron (Zofran) 8mg one tab by mouth every eight hours as needed for nausea. No changes in wound  condition from last visit noted in clinic today.  12/26/2018: F/u abdominal wall fistula drainage, no c/o at this time. Denies any abdominal pain or nausea. Dr. Horton recommends surgery first week of January 2019 for abdominal fistula treatment and possible colostomy placement, patient agree.  01/02/2019: F/u abdominal wall fistula. Dressing with large amount of drainage, malodorous, Dr. Horton is scheduling surgery for third week of January, 2019.  1/16/19: F/U Dr. Horton today in clinic, surgery scheduled for next Friday 1/25/19 with Dr. Horton.     01/23/2019: Presents to wound care clinic for f/u abdominal wound care tx. Surgery scheduled with Dr. Horton for Friday 01/25/2019. Dr. Horton explained Mrs. Morrison surgery procedure including possible complications, Nurse  (Puerto [place]ican) present, patient verbalizes understanding of procedure including possible complications, all questions from patient were answered by Dr. Horton including blood sugar and blood pressure medications per patient's concerns. Consent for surgery and blood transfusion signed by patient, Dr. Horton and nurse witnessed.  Abdominal wound cultures collected per Dr. Horton, cultures sent to lab/micro pending results. Dr. Horton prescribed the following orders: fleet enema for Thursday 1/24/2019, clear liquid diet and not to eat after midnight all for 1/24/2019. Start taking Neomycin and erythromycin by mouth three times a day (1/24/2019) and dulcolax one tab by mouth twice a day, Mrs. Morrison voices understanding.     01/30/2019: F/U wound care treatment with Dr. Horton. Per pt, primary physician Dr. Pj Monae ordered for her to take potassium pills for three days, last day today and scheduled for lab work at primary physician clinic on 1/31/2019. Per pt. Feeling better, no more abdominal pain (went to ER 1/24/2019 and discharged 1/25/2019 c/o abd pain.). Dr. Horton awaiting on primary clearance to re-schedule surgery, per  "pt. She will call wound care clinic and Dr. Horton to make aware of clearance day. Continue with same orders for dressing change for Dr. Horton. Mrs. Morrison requesting gentamicin refill.     02/06/2019: F/u with Dr. Horton for wound care treatment. Mrs. Morrison brought to clinic a clearance for surgery by Dr. Pj Sanches dated 02/06/2019 "Hyperkalemia-Resolved K 4.8 2/1/2019, labs , Cr. 1.05, H/H 10/30. No contraindication to surgery, tight control of BS, Hydration." A copy of EKG (1/24/2019) and lab work from [employer] collected 1/31/2019, reported results 2/1/2019. Dr. Horton scheduled surgery for 02/22/2019, consent were signed on 01/23/2019, all questions were answered by Dr. Horton, this nurse  (Mauritanian) present. Education provided to patient on the importance of having a clear liquid diet the day before surgery 02/21/2019 as per Dr. Horton, new medications and preop preparation before surgery, verbalizes understanding. Dr. Horton ordered Norco 5/325 mg PO for pain, Dulcolax two tabs by mouth to take on 02/21/2019, Soap suds enema (SSE) on Thursday 02/21/2019, Golytely by mouth 2 liters on 2/21/2019, Erythromycin 500 mg one tab by mouth three times a day and Neomycin 1 gm by mouth three times a day.     2/13/2019: Presents to wound care clinic for a f/u with Dr. Horton for wound care treatment. No new orders in wound dressing change, reviewed preop orders with Mrs. Morrison per Dr. Horton, all questions answered. Surgery scheduled for 02/22/2019.  2/20/19: Continues wound care a previously ordered.  Depth measured per Dr. Horton 8cm.  Preop orders reviewed with patient who verbalized understanding.  Surgery scheduled for Fri 2/22/19.  Rx given to patient for Norco and Zofran.    03/06/19: Patient admitted to [facility] on 02/22/19 for exploratory laparotomy of abdomen per Dr. Horton. Patient discharged on 02/28/19 with home health and PICC line with IV antibiotics. " "Staples removed today in clinic. Patient denies any fever, chills, n&v; however, she states that she has "low energy." Continued with wound care as ordered.     3/13/2019: F/U with Dr. Horton for wound care treatment. Continue with same wound dressing change orders as per Dr. Horton, orders followed. Home Health to continue wound dressing change and lab draw for CBC weekly; continue IV Ertapenem/Invaz 1 gm IV daily as ordered.     3/20/2019: Clinic visit with Dr. Horton for abdominal abscess treatment. Presents with moderate draining from abdominal wound; wound size decreasing per measurement. Per Dr. Horton, apply one-piece system ostomy bag to abdominal wound for drain collection, may drain bag when it fills and may change every other day, continue applying gentamicin to wound bed before applying ostomy bag. Continue IV Invaz until completion. If ostomy bag not effective for draining collection, may return to previous orders for wound dressing change. Orders followed. Education provided to Mrs. Morrison on handwashing and ostomy care techniques, voices and demonstrates understanding.   3/27/19: Follow up with Dr. Horton today in clinic for abdominal abscess, moderated tanish yellow drainge present on dressing.  Wound slowly improving, patient refused one-piece ostomy bag stated " no it's too messy." requested to return to previous dressing prior to ostomy bag placement- iodoform gauze, aquacel extra, sm abd, and mefix tape.  Silver nitrate x1 per Dr. Horton today in clinic. Rx given to patient for PO Zofran.     04/03/2019: F/u clinic visit with Dr. Horton. Abdominal wound improving in size, picture on file. Wound cultures from abdominal abscess collected and sent to lab/micro, pending results. Mrs. Morrison states going out of the country ([place] at the end of April and plans to stay there for approximately two months. Dr. Horton ordered IV antibiotic for two more weeks and new wound care dressing, " orders followed.     4/9/2019: Clinic visit with Dr. Horton.  Per Dr. Horton, wound deep measurement increased, draining present. Wound cultures from abdominal wound taken and sent to lab/micro, pending results. C/o abdominal pain and itching around wound, Rx for betamethasone cream 01.% and Norco 5/325mg give in clinic by Dr. Horton. Wound care dressing orders followed. Continue IV antibiotic as previously ordered.  04/17/19: F/u for non-healing wound to abdomen. Culture report negative. Dr. Horton ordered 1 more week of IV antibiotics. Continue with topical wound care as ordered.     4/24/2019: Clinic visit with Dr. Horton for abdominal wound treatment. Wound improvement present, pictures on file. Last IV antibiotic today 4/24/2019, [facility] to discontinue PICC line from left upper arm on 4/25/2019. Per Lyubov Prince, going to [place] on Friday 4/26/2019 and out of the country ([place]) on Monday 4/29/2019. This nurse spoke to Shaunna at [employer] (IV antibiotic delivery company) and informed last dose of antibiotic is today. Dr. Horton ordered Hydrocodone-acetaminophen (Norco) 5-325 mg (32 tabs), Bentyl 10 mg (120 capsules) and Gentamycin ointment. Education provided on the importance of eating food that contains iron (to prevent anemia) as well as eating meat and taking her blood pressure medications (blood pressure medication) on time, voices understanding. All questions answered by Dr. Horton. [facility] outpatient confirmed Betamethasone cream ordered on 4/10/2019 is ready for . Instructions and education provided to Mrs. Morrison on abdominal wound dressing changes, hand washing techniques and medication use, effects and side effects, voices and demonstrates understanding. Discharged home on stable conditions, [facility] will give wound care clinic a call when she is back from [place] in few months from now.     7/17/19: Readmit today to wound care clinic.  Patient was recently out  of the country visiting family in [place].  Patient complains of pain and nausea as on prior visits.  Dr. Horton seen patient today discussed with patient again placing colostomy patient refused.  Culture of wound taken, Dr. Horton restarted patient on zofran po for nausea, and Norco for pain. [facility] restarted today in clinic on Mondays and Fridays and prn. Orders given to gently pack wound with aquacel ag rope, cover with aquacel extra, 4x4 gauze, small abd pad and mefix tape change dressing ever other day by [facility] and Prn per patient.     7/24/2019: Clinic visit with Dr. Horton. Continue with dressing change as ordered. Wound cultures reviewed with patient, lab work ordered by Dr. Horton, no fever present or reported at this time. C/o decrease appetite, medication periactin prescribed. Requesting needles for insulin pen, order prescribed by Dr. Horton.      7/31/2019: F/U clinic visit with Dr. Horton. C/o of excruciated back pain 10/10, not feeling well, lot of gas and left upper quadrant discomfort. New orders prescribed: Augmentin 875-125 mg by mouth twice a day. Bentyl 10 mg one tab by mouth 4 times a day. Tramadol 50 mg one tab by mouth every 6 hours as needed for pain. Abdomen ultrasound. Wound care dressing completed as ordered.     8/7/2019: Clinic visit with Dr. Horton, denies any pain at this time. Wound care dressing done as ordered, no changes in wound size from last visit noted. Continue taking oral antiiotic as ordered, pending abdominal ultrasound, scheduled for 8/13/2019.     8/14/2019: F/U clinic visit with Dr. Horton. Admitted and discharged from emergency department this morning with abdominal pain, CT and ultrasound in filed. Dr. Horton performed a small drainage from the abdominal wound, moderate amount of creamy, serosanguineous fluid from abdominal wound present. Iodoform gauze packed into her wound, dressing changed as ordered. Wound cultures collected and sent  to lab/micro, pending results. Rx for Norco 5/325 mg one tab PO every 4 hours PRN as needed # 24 given to pt by Dr. Horton.      8/21/2019: Clinic visit with Dr. Horton. Wound dressing changed as ordered.      11/6/19: Follow up appt with Dr Horton. Wounds improving slowly. Continues to take antibiotics (Augmentin 875-125mg). No complaints voiced. Follow up in 1 week.  11/13/19:  F/U clinic visit with Dr. Horton.  Wound depth per Dr. Horton is 8.5 cm.  Continue PT Augmentin.  Patient is co left sided abdominal pain.  Abdominal US and labs ordered.  Wound care tolerated well.  Fu with Dr. Horton in 1 week.     11/20/19:Dr Horton assessed patient. Silver nitrate applied to wound. Continuing present plan of care. Patient is scheduled to have abdominal ultra sound Friday 11/22/19. Continues to take Augmentin.  F/U in 1 week.    11/27/19: Follow up in clinic with Dr. Horton. Wound depth 8.5cm, silver nitrate x 3 per Dr. Horton, patient continues taking po Augmentin, c/o left sided abdominal pain, ultrasound results discussed with patient, Dr. Horton will watch for now.  Follow up 1 week 12/4/19.     12/04/19: F/u Dr. Horton. Continue with current wound care treatment. Rx given for Bentyl 10 mg. Patient to follow up in  1 week at wound clinic  12/11/19: F/u with . Continue with current wound care treatment. Rx given for Lotrisone cream to be applied daily per patient to right lower leg rash. Patient to follow up in  1 week at wound clinic    12/18/19: F/u with Dr. Horton. Patient c/o pain 9 out of 10  to abdomen LLQ. Area of pain is warm to touch, pink, and a small nodule can be felt. Patient states that symptoms began about 4 days ago. Stat CT scan ordered. Patient will have CT done tomorrow due to not fasting this morning. Instructed patient to fast before CT scan. Continue with current wound care treatment     01/08/2020: F/u with Dr. Horton. Patient had I&D on 12/22/19 for abscess to abdomen.  New wound care orders given. Patient continues on po abx and home health for wound care. Follow up in 1 week at wound care clinic    01/15/20: F/u with Dr. Horton. Patient c/o mild discomfort at wound to left abdomen. Patient denies any fever, chills, n&v, diarrhea, and/or constipation. Will continue to monitor. Continue with current wound care treatment and follow up in  Wound clinic in 1 week.  1/29/2020: Fu today in wound care clinic with .  Wounds improving slowly.  Silver nitrate x 1 to each wound per Dr. Horton patient tolerated well.  RX give to patient today for Amoxicillin po, Bentyl Po and Norco 5/325mg Po today in clinic.  Fu 1 week 2/5/2020 with Dr. Horton.    Review of Systems   Constitutional: Negative.    HENT: Negative.    Eyes: Negative.    Respiratory: Negative.    Cardiovascular: Negative.    Gastrointestinal: Negative.    Genitourinary: Negative.    Musculoskeletal: Negative.    Skin: Negative.    Neurological: Negative.    Psychiatric/Behavioral: Negative.        Objective:      Physical Exam   Constitutional: She is oriented to person, place, and time. She appears well-developed and well-nourished.   HENT:   Head: Normocephalic.   Eyes: Pupils are equal, round, and reactive to light. Conjunctivae and EOM are normal.   Neck: Normal range of motion. Neck supple.   Cardiovascular: Normal rate, regular rhythm, normal heart sounds and intact distal pulses.   Pulmonary/Chest: Effort normal and breath sounds normal.   Abdominal: Soft. Bowel sounds are normal.   Musculoskeletal: Normal range of motion.   Neurological: She is alert and oriented to person, place, and time. She has normal reflexes.   Skin: Skin is warm and dry.       Assessment:       No diagnosis found.       Incision/Site 02/22/19 1146 Abdomen midline midline (Active)   02/22/19 1146    Present Prior to Hospital Arrival?:    Side:    Location: Abdomen   Orientation: midline   Incision Type: midline   Closure Method:     Additional Comments:    Removal Indication and Assessment:    Wound Outcome:    Removal Indications:    Wound Image   1/29/2020  9:04 AM   Dressing Appearance Intact;Moist drainage 1/29/2020  9:04 AM   Drainage Amount Small 1/29/2020  9:04 AM   Drainage Characteristics/Odor Serosanguineous 1/29/2020  9:04 AM   Appearance Red;Pink;Moist 1/29/2020  9:04 AM   Red (%), Wound Tissue Color 100 % 1/29/2020  9:04 AM   Periwound Area Intact 1/29/2020  9:04 AM   Wound Edges Defined 1/29/2020  9:04 AM   Wound Length (cm) 0.3 cm 1/29/2020  9:04 AM   Wound Width (cm) 0.3 cm 1/29/2020  9:04 AM   Wound Depth (cm) 0.5 cm 1/29/2020  9:04 AM   Wound Volume (cm^3) 0.04 cm^3 1/29/2020  9:04 AM   Wound Surface Area (cm^2) 0.09 cm^2 1/29/2020  9:04 AM   Care Cleansed with:;Sterile normal saline 1/29/2020  9:04 AM   Dressing Applied;Silver;Island/border 1/29/2020  9:04 AM   Periwound Care Absorptive dressing applied;Skin barrier film applied 1/29/2020  9:04 AM   Dressing Change Due 01/31/20 1/29/2020  9:04 AM            Incision/Site 12/22/19 1205 Left Abdomen (Active)   12/22/19 1205    Present Prior to Hospital Arrival?:    Side: Left   Location: Abdomen   Orientation:    Incision Type:    Closure Method:    Additional Comments:    Removal Indication and Assessment:    Wound Outcome:    Removal Indications:    Wound Image   1/29/2020  9:04 AM   Dressing Appearance Intact;Moist drainage 1/29/2020  9:04 AM   Drainage Amount Small 1/29/2020  9:04 AM   Drainage Characteristics/Odor Serosanguineous 1/29/2020  9:04 AM   Appearance Red;Hypergranulation 1/29/2020  9:04 AM   Red (%), Wound Tissue Color 100 % 1/29/2020  9:04 AM   Periwound Area Intact;Dry 1/29/2020  9:04 AM   Wound Edges Defined 1/29/2020  9:04 AM   Wound Length (cm) 0.3 cm 1/29/2020  9:04 AM   Wound Width (cm) 0.7 cm 1/29/2020  9:04 AM   Wound Depth (cm) 0.1 cm 1/29/2020  9:04 AM   Wound Volume (cm^3) 0.02 cm^3 1/29/2020  9:04 AM   Wound Surface Area (cm^2) 0.21 cm^2 1/29/2020   9:04 AM   Care Cleansed with:;Sterile normal saline 1/29/2020  9:04 AM   Dressing Silver;Island/border 1/29/2020  9:04 AM   Periwound Care Skin barrier film applied;Dry periwound area maintained 1/29/2020  9:04 AM   Dressing Change Due 01/31/20 1/29/2020  9:04 AM         Abdominal wound midline  Clean wound with normal saline  Lidocaine 2% gel or 4 % Topical solution: PRN  Chauncey wound:  Maintain dry chauncey wound, Cavilon under tape, betamethasone PRN itching  Primary dressing: Apply gentamycin to wound bed   Secondary dressing: Island border dressing, or  cover with 4x4 gauze, small ABD pad, secure with mefix tape if Island border does not stick.  Frequency: Mon, Wed, Fri    Abdominal wound LUQ  Clean wound with normal saline  Lidocaine 2% gel or 4 % Topical solution: PRN  Chauncey wound:  Cavilon  Primary dressing: Apply gentamycin to wound bed   Secondary dressing:Island border dressing, or  cover with 4x4 gauze, small ABD pad, secure with mefix tape if Island border does not stick.  Frequency: Mon, Wed, Fri    Follow up with Dr. Horton in 1 week on Wednesday 2/5/2020  Home Health:  [facility]:  Home Health nurse to assess wound and perform wound care on Mon, Fri, and PRN. Patient requesting that home health nurse Genna come out to do her wound care    Other orders: Continue po antibiotic Augmentin with plenty of water.  RX given to patient today in clinic for Augmentin po, Bentyl po and Norco 5/325mg po 1/29/2020.            Plan:                2/5/2020

## 2020-02-01 ENCOUNTER — HOSPITAL ENCOUNTER (OUTPATIENT)
Facility: HOSPITAL | Age: 70
Discharge: HOME | DRG: 641 | End: 2020-02-04
Attending: EMERGENCY MEDICINE | Admitting: SURGERY
Payer: MEDICARE

## 2020-02-01 DIAGNOSIS — R10.9 INTRACTABLE ABDOMINAL PAIN: ICD-10-CM

## 2020-02-01 DIAGNOSIS — E86.0 DEHYDRATION: ICD-10-CM

## 2020-02-01 DIAGNOSIS — E87.20 LACTIC ACID INCREASED: Primary | ICD-10-CM

## 2020-02-01 DIAGNOSIS — D72.829 LEUKOCYTOSIS, UNSPECIFIED TYPE: ICD-10-CM

## 2020-02-01 DIAGNOSIS — R11.10 VOMITING: ICD-10-CM

## 2020-02-01 LAB
ALBUMIN SERPL BCP-MCNC: 3.7 G/DL (ref 3.5–5.2)
ALP SERPL-CCNC: 82 U/L (ref 55–135)
ALT SERPL W/O P-5'-P-CCNC: 13 U/L (ref 10–44)
ANION GAP SERPL CALC-SCNC: 15 MMOL/L (ref 8–16)
ANISOCYTOSIS BLD QL SMEAR: SLIGHT
AST SERPL-CCNC: 21 U/L (ref 10–40)
BASOPHILS # BLD AUTO: 0.01 K/UL (ref 0–0.2)
BASOPHILS NFR BLD: 0.1 % (ref 0–1.9)
BILIRUB SERPL-MCNC: 0.3 MG/DL (ref 0.1–1)
BILIRUB UR QL STRIP: NEGATIVE
BUN SERPL-MCNC: 12 MG/DL (ref 8–23)
CALCIUM SERPL-MCNC: 10 MG/DL (ref 8.7–10.5)
CHLORIDE SERPL-SCNC: 97 MMOL/L (ref 95–110)
CLARITY UR: CLEAR
CO2 SERPL-SCNC: 24 MMOL/L (ref 23–29)
COLOR UR: YELLOW
CREAT SERPL-MCNC: 1.1 MG/DL (ref 0.5–1.4)
DIFFERENTIAL METHOD: ABNORMAL
EOSINOPHIL # BLD AUTO: 0 K/UL (ref 0–0.5)
EOSINOPHIL NFR BLD: 0.3 % (ref 0–8)
ERYTHROCYTE [DISTWIDTH] IN BLOOD BY AUTOMATED COUNT: 16.9 % (ref 11.5–14.5)
EST. GFR  (AFRICAN AMERICAN): 60 ML/MIN/1.73 M^2
EST. GFR  (NON AFRICAN AMERICAN): 52 ML/MIN/1.73 M^2
GLUCOSE SERPL-MCNC: 181 MG/DL (ref 70–110)
GLUCOSE UR QL STRIP: NEGATIVE
HCT VFR BLD AUTO: 32.5 % (ref 37–48.5)
HGB BLD-MCNC: 9.7 G/DL (ref 12–16)
HGB UR QL STRIP: NEGATIVE
HYPOCHROMIA BLD QL SMEAR: ABNORMAL
INFLUENZA A, MOLECULAR: NEGATIVE
INFLUENZA B, MOLECULAR: NEGATIVE
KETONES UR QL STRIP: NEGATIVE
LACTATE SERPL-SCNC: 2.6 MMOL/L (ref 0.5–2.2)
LACTATE SERPL-SCNC: 4.7 MMOL/L (ref 0.5–2.2)
LEUKOCYTE ESTERASE UR QL STRIP: NEGATIVE
LIPASE SERPL-CCNC: 37 U/L (ref 4–60)
LYMPHOCYTES # BLD AUTO: 1.6 K/UL (ref 1–4.8)
LYMPHOCYTES NFR BLD: 11.4 % (ref 18–48)
MAGNESIUM SERPL-MCNC: 1.5 MG/DL (ref 1.6–2.6)
MCH RBC QN AUTO: 21.2 PG (ref 27–31)
MCHC RBC AUTO-ENTMCNC: 29.8 G/DL (ref 32–36)
MCV RBC AUTO: 71 FL (ref 82–98)
MONOCYTES # BLD AUTO: 0.5 K/UL (ref 0.3–1)
MONOCYTES NFR BLD: 3.6 % (ref 4–15)
NEUTROPHILS # BLD AUTO: 12 K/UL (ref 1.8–7.7)
NEUTROPHILS NFR BLD: 84.6 % (ref 38–73)
NITRITE UR QL STRIP: NEGATIVE
PH UR STRIP: 7 [PH] (ref 5–8)
PLATELET # BLD AUTO: 492 K/UL (ref 150–350)
PLATELET BLD QL SMEAR: ABNORMAL
PMV BLD AUTO: 7.8 FL (ref 9.2–12.9)
POCT GLUCOSE: 173 MG/DL (ref 70–110)
POTASSIUM SERPL-SCNC: 4.8 MMOL/L (ref 3.5–5.1)
PROCALCITONIN SERPL IA-MCNC: 0.04 NG/ML
PROT SERPL-MCNC: 9 G/DL (ref 6–8.4)
PROT UR QL STRIP: ABNORMAL
RBC # BLD AUTO: 4.57 M/UL (ref 4–5.4)
SODIUM SERPL-SCNC: 136 MMOL/L (ref 136–145)
SP GR UR STRIP: 1.01 (ref 1–1.03)
SPECIMEN SOURCE: NORMAL
TROPONIN I SERPL DL<=0.01 NG/ML-MCNC: 0.01 NG/ML (ref 0–0.03)
URN SPEC COLLECT METH UR: ABNORMAL
UROBILINOGEN UR STRIP-ACNC: NEGATIVE EU/DL
WBC # BLD AUTO: 14.23 K/UL (ref 3.9–12.7)

## 2020-02-01 PROCEDURE — 63600175 PHARM REV CODE 636 W HCPCS: Performed by: EMERGENCY MEDICINE

## 2020-02-01 PROCEDURE — 81003 URINALYSIS AUTO W/O SCOPE: CPT

## 2020-02-01 PROCEDURE — 84484 ASSAY OF TROPONIN QUANT: CPT

## 2020-02-01 PROCEDURE — G0378 HOSPITAL OBSERVATION PER HR: HCPCS

## 2020-02-01 PROCEDURE — 96376 TX/PRO/DX INJ SAME DRUG ADON: CPT

## 2020-02-01 PROCEDURE — 87040 BLOOD CULTURE FOR BACTERIA: CPT

## 2020-02-01 PROCEDURE — 82962 GLUCOSE BLOOD TEST: CPT

## 2020-02-01 PROCEDURE — 84145 PROCALCITONIN (PCT): CPT

## 2020-02-01 PROCEDURE — 83690 ASSAY OF LIPASE: CPT

## 2020-02-01 PROCEDURE — 25000003 PHARM REV CODE 250: Performed by: EMERGENCY MEDICINE

## 2020-02-01 PROCEDURE — 87502 INFLUENZA DNA AMP PROBE: CPT

## 2020-02-01 PROCEDURE — 96366 THER/PROPH/DIAG IV INF ADDON: CPT

## 2020-02-01 PROCEDURE — 11000001 HC ACUTE MED/SURG PRIVATE ROOM

## 2020-02-01 PROCEDURE — 96374 THER/PROPH/DIAG INJ IV PUSH: CPT | Mod: 59

## 2020-02-01 PROCEDURE — 83735 ASSAY OF MAGNESIUM: CPT

## 2020-02-01 PROCEDURE — 96361 HYDRATE IV INFUSION ADD-ON: CPT

## 2020-02-01 PROCEDURE — 94761 N-INVAS EAR/PLS OXIMETRY MLT: CPT

## 2020-02-01 PROCEDURE — 36415 COLL VENOUS BLD VENIPUNCTURE: CPT

## 2020-02-01 PROCEDURE — 80053 COMPREHEN METABOLIC PANEL: CPT

## 2020-02-01 PROCEDURE — 96375 TX/PRO/DX INJ NEW DRUG ADDON: CPT

## 2020-02-01 PROCEDURE — 99285 EMERGENCY DEPT VISIT HI MDM: CPT | Mod: 25

## 2020-02-01 PROCEDURE — 96367 TX/PROPH/DG ADDL SEQ IV INF: CPT

## 2020-02-01 PROCEDURE — 85025 COMPLETE CBC W/AUTO DIFF WBC: CPT

## 2020-02-01 PROCEDURE — 83605 ASSAY OF LACTIC ACID: CPT

## 2020-02-01 PROCEDURE — 25500020 PHARM REV CODE 255: Performed by: EMERGENCY MEDICINE

## 2020-02-01 PROCEDURE — 96365 THER/PROPH/DIAG IV INF INIT: CPT | Mod: 59

## 2020-02-01 RX ORDER — SODIUM CHLORIDE 0.9 % (FLUSH) 0.9 %
10 SYRINGE (ML) INJECTION
Status: DISCONTINUED | OUTPATIENT
Start: 2020-02-01 | End: 2020-02-04 | Stop reason: HOSPADM

## 2020-02-01 RX ORDER — ONDANSETRON 2 MG/ML
4 INJECTION INTRAMUSCULAR; INTRAVENOUS
Status: COMPLETED | OUTPATIENT
Start: 2020-02-01 | End: 2020-02-01

## 2020-02-01 RX ORDER — SODIUM CHLORIDE, SODIUM LACTATE, POTASSIUM CHLORIDE, CALCIUM CHLORIDE 600; 310; 30; 20 MG/100ML; MG/100ML; MG/100ML; MG/100ML
INJECTION, SOLUTION INTRAVENOUS CONTINUOUS
Status: DISCONTINUED | OUTPATIENT
Start: 2020-02-01 | End: 2020-02-04 | Stop reason: HOSPADM

## 2020-02-01 RX ORDER — ONDANSETRON 2 MG/ML
4 INJECTION INTRAMUSCULAR; INTRAVENOUS EVERY 8 HOURS PRN
Status: DISCONTINUED | OUTPATIENT
Start: 2020-02-01 | End: 2020-02-04 | Stop reason: HOSPADM

## 2020-02-01 RX ORDER — MORPHINE SULFATE 2 MG/ML
2 INJECTION, SOLUTION INTRAMUSCULAR; INTRAVENOUS EVERY 4 HOURS PRN
Status: DISCONTINUED | OUTPATIENT
Start: 2020-02-01 | End: 2020-02-04 | Stop reason: HOSPADM

## 2020-02-01 RX ORDER — MORPHINE SULFATE 4 MG/ML
4 INJECTION, SOLUTION INTRAMUSCULAR; INTRAVENOUS EVERY 4 HOURS PRN
Status: DISCONTINUED | OUTPATIENT
Start: 2020-02-01 | End: 2020-02-04 | Stop reason: HOSPADM

## 2020-02-01 RX ORDER — MORPHINE SULFATE 4 MG/ML
4 INJECTION, SOLUTION INTRAMUSCULAR; INTRAVENOUS
Status: COMPLETED | OUTPATIENT
Start: 2020-02-01 | End: 2020-02-01

## 2020-02-01 RX ADMIN — MORPHINE SULFATE 4 MG: 4 INJECTION INTRAVENOUS at 03:02

## 2020-02-01 RX ADMIN — SODIUM CHLORIDE 1000 ML: 0.9 INJECTION, SOLUTION INTRAVENOUS at 03:02

## 2020-02-01 RX ADMIN — PIPERACILLIN SODIUM AND TAZOBACTAM SODIUM 4.5 G: 4; .5 INJECTION, POWDER, LYOPHILIZED, FOR SOLUTION INTRAVENOUS at 04:02

## 2020-02-01 RX ADMIN — SODIUM CHLORIDE 1000 ML: 0.9 INJECTION, SOLUTION INTRAVENOUS at 05:02

## 2020-02-01 RX ADMIN — VANCOMYCIN HYDROCHLORIDE 2000 MG: 1 INJECTION, POWDER, LYOPHILIZED, FOR SOLUTION INTRAVENOUS at 05:02

## 2020-02-01 RX ADMIN — ONDANSETRON 4 MG: 2 INJECTION INTRAMUSCULAR; INTRAVENOUS at 10:02

## 2020-02-01 RX ADMIN — SODIUM CHLORIDE, SODIUM LACTATE, POTASSIUM CHLORIDE, AND CALCIUM CHLORIDE: .6; .31; .03; .02 INJECTION, SOLUTION INTRAVENOUS at 08:02

## 2020-02-01 RX ADMIN — MORPHINE SULFATE 2 MG: 2 INJECTION, SOLUTION INTRAMUSCULAR; INTRAVENOUS at 10:02

## 2020-02-01 RX ADMIN — ONDANSETRON 4 MG: 2 INJECTION INTRAMUSCULAR; INTRAVENOUS at 03:02

## 2020-02-01 RX ADMIN — IOHEXOL 75 ML: 350 INJECTION, SOLUTION INTRAVENOUS at 04:02

## 2020-02-01 NOTE — H&P
General Surgery History and Physical    HPI: Pt is a 70 y/o F followed by Dr. Horton w/ hx of Aiyana's s/p colostomy reversal complicated by colocutaneous fistula who presents to the ED w/ increased abdominal pain and N/V over the past day.  Pt reports no improvement over the course of the day resulting in presentation.  Reports +flatus/BM.  Voiding w/ no issues.  Reports no fevers, chills, CP, or SOB.    ROS: see HPI; 14 point ROS otherwise negative    PMHx: HTN, DMII  PSHx: Hartmans; colostomy reversal; abdominal wound debridement; ; cholecystectomy; incisional hernia repair   Meds: see med rec  Allergies: chlorhexadine  Fam Hx: non-contributory  Social Hx: no tobacco, alcohol, or IVDU    Temp: 98.3   HR: 64-94   RR: 16-39   BP: 105-184/55-95   SpO2:     PE: General: AAO X 3; NAD         CV: RRR; no murmurs, rubs, or gallops         Resp: CTA b/l; no wheezes, crackles, or rhonchi         Abdomen: soft; mild mid-abdominal TTP; ND; minimal drainage from fistula tract    Labs: WBC: 14.2; H/H: 9.7/32.5; lactate: 2.6    CT abdomen/pelvis: no acute intra-abdominal pathology; fistula tract w/ decrease in surrounding inflammation and no drainable collections    A/P: 70 y/o F followed by Dr. Horton w/ hx of Aiyana's s/p colostomy reversal complicated by colocutaneous fistula w/ abdominal pain and N/V  - admit to floor for observation; NPO and IVF  - will start diet in AM if symptoms improve    Brandon Hernandez MD PGY IV

## 2020-02-01 NOTE — ED NOTES
Patient presents to ER with c/o N/V/abdominal pain for the past 2 hours. Patient took Norco with no relief. 10/10 on pain scale. Patient had abdominal surgery 2 weeks ago

## 2020-02-01 NOTE — ED PROVIDER NOTES
Encounter Date: 2020    SCRIBE #1 NOTE: I, Michelle Ahumada, am scribing for, and in the presence of,  Dr. Soliman. I have scribed the entire note.       History     Chief Complaint   Patient presents with    Emesis     started this morning non stop for 2 hours, pain to abdoman, chills, diaphoretic.     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 Recently discharged on 20 after admission for SBO.  This morning the patient has been having persistent nausea with a few episodes of vomiting. Patient rates her pain in the ED a 9/10 and diffusely in abdomen.  Reports to have associated chills. Patient denies fever, diarrhea, chest pain, shortness of breath, or any other complaints at this time. Patient's last BM was last night and was normal. She has been taking hydrocodone and amoxicillin at home, last dose was this morning of each.  Patient's general surgeon is Dr Horton.        The history is provided by the patient and a relative. The history is limited by a language barrier. A  was used.     Review of patient's allergies indicates:   Allergen Reactions    Chlorhexidine gluconate Rash     Chlorhexidine/alcohol wipes. Rash and blistering.     Past Medical History:   Diagnosis Date    Diabetes mellitus     Diabetes mellitus, type 2     Hypertension      Past Surgical History:   Procedure Laterality Date     SECTION, CLASSIC      x2    CHOLECYSTECTOMY      COLON SURGERY      [facility]    COLONOSCOPY N/A 2018    Procedure: COLONOSCOPY;  Surgeon: Gibran Aguayo MD;  Location: [facility];  Service: Endoscopy;  Laterality: N/A;    COLOSTOMY Left     CYSTOSCOPY WITH URETEROSCOPY, RETROGRADE PYELOGRAPHY, AND  INSERTION OF STENT Left 2/22/2019    Procedure: CYSTOSCOPY, WITH RETROGRADE PYELOGRAM AND URETERAL STENT INSERTION with placement of a muir cath;  Surgeon: Ulisses Horton MD;  Location: [facility] OR;  Service: General;  Laterality: Left;    INCISION AND DRAINAGE OF ABSCESS N/A 12/22/2019    Procedure: INCISION AND DRAINAGE, ABSCESS;  Surgeon: Ulisses Horton MD;  Location: [facility] OR;  Service: General;  Laterality: N/A;    INCISION OF ABDOMINAL WALL N/A 8/10/2018    Procedure: INCISION, ABDOMINAL WALL;  Surgeon: Ulisses Horton MD;  Location: [facility] OR;  Service: General;  Laterality: N/A;    INCISIONAL HERNIA REPAIR Right 2010     Family History   Problem Relation Age of Onset    No Known Problems Mother     No Known Problems Father      Social History     Tobacco Use    Smoking status: Never Smoker    Smokeless tobacco: Never Used   Substance Use Topics    Alcohol use: No    Drug use: No     Review of Systems   Constitutional: Positive for chills. Negative for fever.   HENT: Negative for congestion, rhinorrhea and sore throat.    Eyes: Negative for redness and visual disturbance.   Respiratory: Negative for cough, shortness of breath and wheezing.    Cardiovascular: Negative for chest pain and palpitations.   Gastrointestinal: Positive for abdominal pain, nausea and vomiting. Negative for diarrhea.   Genitourinary: Negative for dysuria and hematuria.   Musculoskeletal: Negative for back pain, myalgias and neck pain.   Skin: Negative for rash.   Neurological: Negative for dizziness, weakness and light-headedness.   Psychiatric/Behavioral: Negative for confusion.   All other systems reviewed and are negative.      Physical Exam     Initial Vitals   BP Pulse Resp Temp SpO2   02/01/20 1436 02/01/20 1436 02/01/20 1437 02/01/20 1436 02/01/20 1436   (!) 148/77 86 18 97.6 °F (36.4 °C) 100 %      MAP       --                Physical Exam    Nursing note and vitals reviewed.  Constitutional: She appears  well-developed and well-nourished. She appears distressed.   Actively vomiting   HENT:   Head: Normocephalic and atraumatic.   Right Ear: External ear normal.   Left Ear: External ear normal.   Nose: Nose normal.   Mouth/Throat: Mucous membranes are dry.   Eyes: EOM are normal. Pupils are equal, round, and reactive to light.   Neck: Normal range of motion. Neck supple. No stridor present.   Cardiovascular: Normal rate, regular rhythm and normal heart sounds.   No murmur heard.  Pulmonary/Chest: Breath sounds normal. No respiratory distress.   Abdominal: Soft. Bowel sounds are normal. There is tenderness. There is no rebound and no guarding.   Periumbilical incision with some purulent drainage, no erythema. Diffuse abdominal tenderness.   Musculoskeletal: Normal range of motion. She exhibits no edema or tenderness.   Neurological: She is alert and oriented to person, place, and time. GCS score is 15. GCS eye subscore is 4. GCS verbal subscore is 5. GCS motor subscore is 6.   Skin: Skin is warm and dry. Capillary refill takes less than 2 seconds.   Psychiatric: She has a normal mood and affect.         ED Course   Procedures  Labs Reviewed   CBC W/ AUTO DIFFERENTIAL - Abnormal; Notable for the following components:       Result Value    WBC 14.23 (*)     Hemoglobin 9.7 (*)     Hematocrit 32.5 (*)     Mean Corpuscular Volume 71 (*)     Mean Corpuscular Hemoglobin 21.2 (*)     Mean Corpuscular Hemoglobin Conc 29.8 (*)     RDW 16.9 (*)     Platelets 492 (*)     MPV 7.8 (*)     Gran # (ANC) 12.0 (*)     Gran% 84.6 (*)     Lymph% 11.4 (*)     Mono% 3.6 (*)     Platelet Estimate Increased (*)     All other components within normal limits   COMPREHENSIVE METABOLIC PANEL - Abnormal; Notable for the following components:    Glucose 181 (*)     Total Protein 9.0 (*)     eGFR if non  52 (*)     All other components within normal limits   MAGNESIUM - Abnormal; Notable for the following components:    Magnesium 1.5  (*)     All other components within normal limits   LACTIC ACID, PLASMA - Abnormal; Notable for the following components:    Lactate (Lactic Acid) 4.7 (*)     All other components within normal limits    Narrative:      Lactic acid critical result(s) called and verbal readback obtained   from   Rosangela Juan RN. by [facility] 02/01/2020 16:09   POCT GLUCOSE - Abnormal; Notable for the following components:    POCT Glucose 173 (*)     All other components within normal limits   INFLUENZA A & B BY MOLECULAR   CULTURE, BLOOD   CULTURE, BLOOD   LIPASE   TROPONIN I   PROCALCITONIN   URINALYSIS, REFLEX TO URINE CULTURE   LACTIC ACID, PLASMA          X-Rays:   Independently Interpreted Readings:   Other Readings:  Reviewed by myself, read by radiology.    Imaging Results          CT Abdomen Pelvis With Contrast (Final result)  Result time 02/01/20 17:12:31    Final result by Raulito Lanza MD (02/01/20 17:12:31)                 Impression:      1. Postoperative changes with probable fistulous tract in the left abdomen/pelvis with soft tissue thickening and minimal extraluminal air.  Overall this has improved from the prior study with no detrimental changes.  No drainable abscess is identified.  Follow-up is recommended.  2. Stable 3 mm right basilar noncalcified pulmonary nodule.  Recommend surveillance.  3. Left renal cysts.  4. Diverticulosis of the mid/transverse colon.  5. Small hiatal hernia.      Electronically signed by: Raulito Lanza  Date:    02/01/2020  Time:    17:12             Narrative:    EXAMINATION:  CT ABDOMEN PELVIS WITH CONTRAST    CLINICAL HISTORY:  Abd pain, fever, abscess suspected;    TECHNIQUE:  Low dose axial images, sagittal and coronal reformations were obtained from the lung bases to the pubic symphysis following the IV administration of 75 mL of Omnipaque 350 .  Oral contrast was not given.    COMPARISON:  01/19/2020, 12/19/2019    FINDINGS:  Right basilar 3 mm pulmonary nodule is similar to prior  studies.    No focal abnormality of the liver or spleen.    Small hiatal hernia.    Status post cholecystectomy.  Bile ducts are within normal limits.    No adenopathy or ascites.    Aorta is normal in caliber.    Mild diverticulosis of the mid/transverse colon.  No evidence of diverticulitis.    Postoperative changes of the distal and proximal colon.    Postoperative changes of the small bowel.    As noted on the prior study there is mild soft tissue thickening and matting of loops of primarily small bowel in the left lower abdomen and pelvis.  A fistulous tract has been noted on prior studies exiting left lower abdominal wall.  Enterocutaneous fistula is possible with the small bowel or possibly the sigmoid colon.  Minimal extraluminal air within this tract/complex collection which has decreased from the prior study at approximately image 95 of series 2    Overall there has been improvement from the prior study.    No drainable fluid collection is identified.    Lack of bowel contrast may diminish characterization.    Mild distension of probable slightly more proximal postoperative small bowel.  Bowel sutures are noted.    Stable mild ventral wall hernia with slight protrusion superior to the umbilicus in the midline.    Small upper pole left renal cysts.    No stone, soft tissue mass or hydronephrosis bilaterally in the kidneys.    No acute osseous abnormality is identified.    No focal abnormality of the urinary bladder.  The uterus is present.    No free fluid in the pelvis.                               X-Ray Abdomen Flat And Erect (Final result)  Result time 02/01/20 15:53:53    Final result by Cheko Sapp MD (02/01/20 15:53:53)                 Impression:      1. Moderate to large amount of stool throughout the colon, could reflect constipation noting surgical change of the bowel on the right.  No findings to suggest high-grade small-bowel obstruction.  Correlation advised.      Electronically signed  by: Cheko Sapp MD  Date:    02/01/2020  Time:    15:53             Narrative:    EXAMINATION:  XR ABDOMEN FLAT AND ERECT    CLINICAL HISTORY:  Vomiting, unspecified    TECHNIQUE:  Flat and erect AP views of the abdomen were performed.    COMPARISON:  01/21/2020    FINDINGS:  One upright view, 1 supine view.    No significant air-fluid levels on upright view.  Air and stool is seen within the large bowel and projected over the rectum.  There is moderate to large amount of stool throughout the colon.  Surgical change noted of the bowel in the right.  Scattered abdominal surgical clips noted.  There are no calcifications to convincingly suggest nephrolithiasis or cholelithiasis.  The lower lung zones are grossly clear.  Degenerative changes are noted of the osseous structures.  No large volume free air or pneumatosis.                              Medical Decision Making:   Initial Assessment:   This is a 68 y/o female with a history of DM and HTN who presents to the ED with complaint of abdominal pain with nausea and vomiting since this morning.  Differential Diagnosis:   Gastroenteritis, gastritis, ulcer, cholecystitis, gallstones, pancreatitis, ileus, small bowel obstruction, appendicitis, constipation.   Intra-abdominal infection, post-operative complication  Independently Interpreted Test(s):   I have ordered and independently interpreted EKG Reading(s) - see prior notes  Clinical Tests:   Lab Tests: Ordered and Reviewed  Radiological Study: Reviewed and Ordered  ED Management:  Lactate 4.7  + leukocytosis    Give abx and discussed with general surgery who agrees with observation                    ED Course as of Feb 01 1745   Sat Feb 01, 2020   1507 BP(!): 148/77 [LD]   1507 Temp: 97.6 °F (36.4 °C) [LD]   1507 Pulse: 86 [LD]   1507 Resp: 18 [LD]   1507 SpO2: 100 % [LD]   1611 Concerned for sepsis.  Code sepsis activated at this time.   Lactate, Hesham(!!): 4.7 [LD]   1614 EKG with NSR, rate of 88 bpm.  + LVH,  normal ST segments. No STEMI    [LD]   1730 Spoke with Dr Horton who states that Dr Valdez is taking call for him this weekend    [LD]   1732 Spoke with Dr Hernandez on call for [facility] general surgery who agrees with observation overnight.    [LD]      ED Course User Index  [LD] Carmen Soliman MD            Clinical Impression:     1. Lactic acid increased    2. Vomiting    3. Intractable abdominal pain    4. Dehydration    5. Leukocytosis, unspecified type      Disposition:   Disposition: Placed in Observation  Condition: Stable      I, Carmen Soliman,  personally performed the services described in this documentation. All medical record entries made by the scribe were at my direction and in my presence.  I have reviewed the chart and agree that the record reflects my personal performance and is accurate and complete. Carmen Soliman M.D. 5:45 PM02/01/2020                 Carmen Soliman MD  02/01/20 1743

## 2020-02-01 NOTE — ED NOTES
RECEIVED A CALL FROM MARY JANE IN THE LAB PATIENT HAS A CRITICAL LAB LACTIC 4.74. DR ABDALLA NOTIFIED

## 2020-02-02 VITALS
SYSTOLIC BLOOD PRESSURE: 132 MMHG | HEIGHT: 65 IN | DIASTOLIC BLOOD PRESSURE: 64 MMHG | BODY MASS INDEX: 27.36 KG/M2 | RESPIRATION RATE: 15 BRPM | OXYGEN SATURATION: 98 % | TEMPERATURE: 98 F | HEART RATE: 79 BPM | WEIGHT: 164.25 LBS

## 2020-02-02 PROBLEM — E87.20 LACTIC ACID INCREASED: Status: ACTIVE | Noted: 2020-02-02

## 2020-02-02 LAB
POCT GLUCOSE: 173 MG/DL (ref 70–110)
POCT GLUCOSE: 94 MG/DL (ref 70–110)

## 2020-02-02 PROCEDURE — 11000001 HC ACUTE MED/SURG PRIVATE ROOM

## 2020-02-02 PROCEDURE — G0378 HOSPITAL OBSERVATION PER HR: HCPCS

## 2020-02-02 PROCEDURE — 63600175 PHARM REV CODE 636 W HCPCS: Performed by: EMERGENCY MEDICINE

## 2020-02-02 PROCEDURE — 96376 TX/PRO/DX INJ SAME DRUG ADON: CPT

## 2020-02-02 PROCEDURE — 96375 TX/PRO/DX INJ NEW DRUG ADDON: CPT

## 2020-02-02 PROCEDURE — 94761 N-INVAS EAR/PLS OXIMETRY MLT: CPT

## 2020-02-02 PROCEDURE — 96361 HYDRATE IV INFUSION ADD-ON: CPT

## 2020-02-02 RX ADMIN — ONDANSETRON 4 MG: 2 INJECTION INTRAMUSCULAR; INTRAVENOUS at 08:02

## 2020-02-02 RX ADMIN — SODIUM CHLORIDE, SODIUM LACTATE, POTASSIUM CHLORIDE, AND CALCIUM CHLORIDE: .6; .31; .03; .02 INJECTION, SOLUTION INTRAVENOUS at 04:02

## 2020-02-02 RX ADMIN — MORPHINE SULFATE 2 MG: 2 INJECTION, SOLUTION INTRAMUSCULAR; INTRAVENOUS at 08:02

## 2020-02-02 NOTE — PLAN OF CARE
Problem: Fall Injury Risk  Goal: Absence of Fall and Fall-Related Injury  Outcome: Ongoing, Progressing  Intervention: Identify and Manage Contributors to Fall Injury Risk  Flowsheets (Taken 2/1/2020 2219)  Self-Care Promotion: independence encouraged; BADL personal objects within reach; BADL personal routines maintained  Medication Review/Management: medications reviewed; high risk medications identified  Intervention: Promote Injury-Free Environment  Flowsheets (Taken 2/1/2020 2219)  Environmental Safety Modification: assistive device/personal items within reach; clutter free environment maintained; room organization consistent     Problem: Adult Inpatient Plan of Care  Goal: Plan of Care Review  Outcome: Ongoing, Progressing  Flowsheets (Taken 2/1/2020 2219)  Plan of Care Reviewed With: patient  Goal: Absence of Hospital-Acquired Illness or Injury  Outcome: Ongoing, Progressing  Intervention: Prevent VTE (venous thromboembolism)  Flowsheets (Taken 2/1/2020 2219)  VTE Prevention/Management: dorsiflexion/plantar flexion performed  Goal: Optimal Comfort and Wellbeing  Outcome: Ongoing, Progressing  Intervention: Provide Person-Centered Care  Flowsheets (Taken 2/1/2020 2219)  Trust Relationship/Rapport: care explained; questions answered; choices provided; questions encouraged; emotional support provided; reassurance provided; empathic listening provided; thoughts/feelings acknowledged     Problem: Nausea and Vomiting  Goal: Fluid and Electrolyte Balance  Outcome: Ongoing, Progressing  Intervention: Prevent and Manage Nausea and Vomiting  Flowsheets (Taken 2/1/2020 2219)  Environmental Support: calm environment promoted; caregiver consistency promoted; personal routine supported; rest periods encouraged     Problem: Pain Acute  Goal: Optimal Pain Control  Outcome: Ongoing, Progressing  Intervention: Develop Pain Management Plan  Flowsheets (Taken 2/1/2020 2219)  Pain Management Interventions: care clustered;  diversional activity provided; pain management plan reviewed with patient/caregiver  Intervention: Prevent or Manage Pain  Flowsheets (Taken 2/1/2020 2219)  Sleep/Rest Enhancement: awakenings minimized; consistent schedule promoted; noise level reduced; regular sleep/rest pattern promoted  Sensory Stimulation Regulation: music/television provided for relaxation  Intervention: Optimize Psychosocial Wellbeing  Flowsheets (Taken 2/1/2020 2219)  Supportive Measures: active listening utilized; goal setting facilitated; self-responsibility promoted; problem solving facilitated  Diversional Activities: television     Cued into patient's room.  Permission received per patient to turn camera to view patient.  Introduced as VN for night shift that will be working with floor nurse and nursing assistant.  Educated patient on VN's role in patient care. Plan of care reviewed with patient. Education per flowsheet.  Opportunity given for questions and questions answered.  Admission assessment questions answered.  No complaints.  Instructed to call for assistance.  Will cont to monitor.    Labs, notes, and orders reviewed.

## 2020-02-02 NOTE — DISCHARGE SUMMARY
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  CT abdomen/pelvis displayed no acute intrabdominal process.  Pt would be admitted to observation and rehydrated.  The following morning pt would report improvement in systems and started on a regular diet.  The patient would be discharged later that day afebrile, tolerating diet w/ no N/V, w/ pain well controlled.    Disposition: discharge to home    Discharge Activity: as tolerated    Discharge Diet: regular     Discharge Medications: see med rec     Follow Up: f/u Dr. Horton in clinic as scheduled

## 2020-02-02 NOTE — ED NOTES
Introduced self to patient. Let her know I would be calling report soon to floor nurse. Patient in no pain. VSS

## 2020-02-02 NOTE — PLAN OF CARE
Patient came from ED last night around 8:10 pm. She is AAO X 4. Vital signs stable. Kept her NPO as per order. IV fluids infusing according to MAR. Given IV Morphine for pain with good effect. Abdominal wound dressing changed. Cleaned with normal saline. Applied dry soft gauze and Tegaderm. Purulent pus is coming from the sinus. Voided freely. On tele monitor shows NSR. Safety measures maintained. Will continue to monitor patient.

## 2020-02-02 NOTE — PLAN OF CARE
Discharge orders noted, no HH or HME ordered.    Future Appointments   Date Time Provider Department Center   2/5/2020  8:30 AM Ulisses Horton MD [facility] WOUND [facility]       Pt's nurse will go over medications/signs and symptoms prior to discharge       02/02/20 1437   Final Note   Assessment Type Final Discharge Note   Anticipated Discharge Disposition Home   What phone number can be called within the next 1-3 days to see how you are doing after discharge? [phone]   Hospital Follow Up  Appt(s) scheduled? No  (Offices closed for Weekend. Patient to schedule own follow up appointment.  )   Right Care Referral Info   Post Acute Recommendation No Care     Christine Espinal, RN Transitional Navigator  [phone]

## 2020-02-03 PROCEDURE — 11000001 HC ACUTE MED/SURG PRIVATE ROOM

## 2020-02-03 PROCEDURE — G0378 HOSPITAL OBSERVATION PER HR: HCPCS

## 2020-02-04 PROCEDURE — G0378 HOSPITAL OBSERVATION PER HR: HCPCS

## 2020-02-05 ENCOUNTER — HOSPITAL ENCOUNTER (OUTPATIENT)
Dept: WOUND CARE | Facility: HOSPITAL | Age: 70
Discharge: HOME | End: 2020-02-05
Attending: SURGERY
Payer: MEDICARE

## 2020-02-05 ENCOUNTER — PATIENT OUTREACH (OUTPATIENT)
Dept: ADMINISTRATIVE | Facility: CLINIC | Age: 70
End: 2020-02-05

## 2020-02-05 VITALS
HEART RATE: 79 BPM | WEIGHT: 164 LBS | DIASTOLIC BLOOD PRESSURE: 79 MMHG | HEIGHT: 65 IN | TEMPERATURE: 99 F | SYSTOLIC BLOOD PRESSURE: 154 MMHG | BODY MASS INDEX: 27.32 KG/M2

## 2020-02-05 DIAGNOSIS — Z98.890 S/P EXPLORATORY LAPAROTOMY: ICD-10-CM

## 2020-02-05 DIAGNOSIS — L02.211 ABSCESS OF ABDOMINAL WALL: Primary | ICD-10-CM

## 2020-02-05 DIAGNOSIS — E11.620 TYPE 2 DIABETES MELLITUS WITH DIABETIC DERMATITIS, WITHOUT LONG-TERM CURRENT USE OF INSULIN: ICD-10-CM

## 2020-02-05 DIAGNOSIS — K63.2 COLOCUTANEOUS FISTULA: ICD-10-CM

## 2020-02-05 PROCEDURE — 99213 OFFICE O/P EST LOW 20 MIN: CPT

## 2020-02-05 NOTE — PROGRESS NOTES
"Subjective:       Patient ID: Azucena Morrison is a 69 y.o. female.    Chief Complaint: Non-healing Wound (midline abdomen)    6/29/17: Pt re-admit for distal abdominal wound. Pt denies any fevers or chills but states she feels nauseous at times. Returned to [place] June 28, from [place], reports much trouble with abdominal wound while in [place].  7/6/17-- Patient scheduled for surgical drainage of wound Tuesday 7/11/17DB  7/19/17-- Patient post op clinic visit.  8/16/17: CT of abdomen and pelvis done 8/9/17 due to suspected fistula  8/23/17-- U/S of abdomen done today.  12/13/17 Wound vac with 20cc drainage in clinic today.  1/10/18: on PO abx  1/24/18: Fistulagram ordered per Dr. Horton. Patient still on PO abx  02/21/18 Patient is not on antibiotics at this time. BS fasting 135 per patient report this am.  03/07/18 Culture of Anterior Abdominal Wound is positive. No antibiotics at this time.  fasting per patient report.    03/21/18 Patient c/o nausea along with abdominal tenderness near abd midline wound. Patient states that pain level is 6 and that she passed two sero-sanguineous clots from wound. Patient is afebrile this am.  3/28/18: patient continue antibiotics and reports that pain is less than last weeks clinic visit. Drainage has decreased as well  04/04/18 Patient states that abdominal pain is "pressure" sensation and that when she pushes down on her abdomen on either side of abdominal wound she feels like she has to urinate. Patient reports pain level of 3 this am.  fasting this am per patient report.   5/2/18: Patient had Abdomina Toribio today before wound care. She had discontinued Bactrim PO per Dr. Horton's recommendation and culture results and is now taking Amoxicillin PO  6/20/18: Pt reports itching to wound and periwound   6/27/18: patient reports no new complaints with regards to her wound or abdomen, wound continues to decrease in size and drainage  8/1/18: Pt reports increased pain " to abdomen with nausea and emesis since thursday 7/26/18, denies any fevers, patient did no go to ER as instructed by home health nurse on Sat. 7/28/18.  8/29/18: Patient admitted to hospital 8/2 for abdominal wound complications. Surgery for abdominal abscess per Dr. Horton on 8/3. Patient placed on IV antibiotics and discharged home on 8/18 with [facility]  and PICC line to LUE. Patient currently on IV ertapenem. IV medication sent to patients home per [facility].   9/5/18: IV Ertapenem to continue 1 week. Culture sent to lab. [facility]  sent orders. PICC line intact to E.  9/12/18 Antibiotics completed. Culture results pending. 1 deep stitch remaining.  9/12/18: Culture positive for E. Coli, patient to continue Ertapenem IV antibiotics x 2weeks. [facility] notified  9/26/18: F/U abdominal wound, wound continues to improve. Patient will complete IV antibiotics today  10/3/18: patient c/o diarrhea for 2 days and nausea with some vomiting. Labs and stool culture ordered. IV antibiotics discontinued today  10/10/2018 patient will start on IV antibiotic Invaz IV once a day, pending PICC line placement, order placed today  for PICC per Dr. Horton.  10/24/18: patient on IV antibiotics, tolerating well. Continue for 1 week  11/7/2018: IV Antibiotic discontinued.  11/8/2018: PICC line to left upper arm discontinued by Home Health.  11/21/2018 F/u for abdominal wall fistula , c/o nausea  No vomiting , no fever  12/5/2018: F/u for abdominal wall fisula, c/o gas, Dr. Horton will order Bentyl. Surgery schedule for January 2019.  12/12/2018: F/u for abdominal wall fistula, c/o abdominal pain. Dr. Horton ordered Norco 5/325mg tab 1 PO every 4 hours as needed for pain and referred patient for x-ray of abdomen after clinic today.  12/19/2018: F/u for abdominal wall fisula, c/o nausea, Dr. Horton re ordered Ondansetron (Zofran) 8mg one tab by mouth every eight hours as needed for nausea. No changes in wound  condition from last visit noted in clinic today.  12/26/2018: F/u abdominal wall fistula drainage, no c/o at this time. Denies any abdominal pain or nausea. Dr. Horton recommends surgery first week of January 2019 for abdominal fistula treatment and possible colostomy placement, patient agree.  01/02/2019: F/u abdominal wall fistula. Dressing with large amount of drainage, malodorous, Dr. Horton is scheduling surgery for third week of January, 2019.  1/16/19: F/U Dr. Horton today in clinic, surgery scheduled for next Friday 1/25/19 with Dr. Horton.     01/23/2019: Presents to wound care clinic for f/u abdominal wound care tx. Surgery scheduled with Dr. oHrton for Friday 01/25/2019. Dr. Horton explained Mrs. Morrison surgery procedure including possible complications, Nurse  (Austrian) present, patient verbalizes understanding of procedure including possible complications, all questions from patient were answered by Dr. Horton including blood sugar and blood pressure medications per patient's concerns. Consent for surgery and blood transfusion signed by patient, Dr. Horton and nurse witnessed.  Abdominal wound cultures collected per Dr. Horton, cultures sent to lab/micro pending results. Dr. Horton prescribed the following orders: fleet enema for Thursday 1/24/2019, clear liquid diet and not to eat after midnight all for 1/24/2019. Start taking Neomycin and erythromycin by mouth three times a day (1/24/2019) and dulcolax one tab by mouth twice a day, Mrs. Morrison voices understanding.     01/30/2019: F/U wound care treatment with Dr. Horton. Per pt, primary physician Dr. Pj Monae ordered for her to take potassium pills for three days, last day today and scheduled for lab work at primary physician clinic on 1/31/2019. Per pt. Feeling better, no more abdominal pain (went to ER 1/24/2019 and discharged 1/25/2019 c/o abd pain.). Dr. Horton awaiting on primary clearance to re-schedule surgery, per  "pt. She will call wound care clinic and Dr. Horton to make aware of clearance day. Continue with same orders for dressing change for Dr. Horton. Mrs. Morrison requesting gentamicin refill.     02/06/2019: F/u with Dr. Horton for wound care treatment. Mrs. Morrison brought to clinic a clearance for surgery by Dr. Pj Sanches dated 02/06/2019 "Hyperkalemia-Resolved K 4.8 2/1/2019, labs , Cr. 1.05, H/H 10/30. No contraindication to surgery, tight control of BS, Hydration." A copy of EKG (1/24/2019) and lab work from [place] collected 1/31/2019, reported results 2/1/2019. Dr. Horton scheduled surgery for 02/22/2019, consent were signed on 01/23/2019, all questions were answered by Dr. Horton, this nurse  (Chilean) present. Education provided to patient on the importance of having a clear liquid diet the day before surgery 02/21/2019 as per Dr. Horton, new medications and preop preparation before surgery, verbalizes understanding. Dr. Horton ordered Norco 5/325 mg PO for pain, Dulcolax two tabs by mouth to take on 02/21/2019, Soap suds enema (SSE) on Thursday 02/21/2019, Golytely by mouth 2 liters on 2/21/2019, Erythromycin 500 mg one tab by mouth three times a day and Neomycin 1 gm by mouth three times a day.     2/13/2019: Presents to wound care clinic for a f/u with Dr. Horton for wound care treatment. No new orders in wound dressing change, reviewed preop orders with Mrs. Morrison per Dr. Horton, all questions answered. Surgery scheduled for 02/22/2019.  2/20/19: Continues wound care a previously ordered.  Depth measured per Dr. Horton 8cm.  Preop orders reviewed with patient who verbalized understanding.  Surgery scheduled for Fri 2/22/19.  Rx given to patient for Norco and Zofran.    03/06/19: Patient admitted to [facility] on 02/22/19 for exploratory laparotomy of abdomen per Dr. Horton. Patient discharged on 02/28/19 with home health and PICC line with IV antibiotics. " "Staples removed today in clinic. Patient denies any fever, chills, n&v; however, she states that she has "low energy." Continued with wound care as ordered.     3/13/2019: F/U with Dr. Horton for wound care treatment. Continue with same wound dressing change orders as per Dr. Horton, orders followed. Home Health to continue wound dressing change and lab draw for CBC weekly; continue IV Ertapenem/Invaz 1 gm IV daily as ordered.     3/20/2019: Clinic visit with Dr. Horton for abdominal abscess treatment. Presents with moderate draining from abdominal wound; wound size decreasing per measurement. Per Dr. Horton, apply one-piece system ostomy bag to abdominal wound for drain collection, may drain bag when it fills and may change every other day, continue applying gentamicin to wound bed before applying ostomy bag. Continue IV Invaz until completion. If ostomy bag not effective for draining collection, may return to previous orders for wound dressing change. Orders followed. Education provided to Mrs. Morrison on handwashing and ostomy care techniques, voices and demonstrates understanding.   3/27/19: Follow up with Dr. Horton today in clinic for abdominal abscess, moderated tanish yellow drainge present on dressing.  Wound slowly improving, patient refused one-piece ostomy bag stated " no it's too messy." requested to return to previous dressing prior to ostomy bag placement- iodoform gauze, aquacel extra, sm abd, and mefix tape.  Silver nitrate x1 per Dr. Horton today in clinic. Rx given to patient for PO Zofran.     04/03/2019: F/u clinic visit with Dr. Horton. Abdominal wound improving in size, picture on file. Wound cultures from abdominal abscess collected and sent to lab/micro, pending results. Mrs. Morrison states going out of the country ([place]) at the end of April and plans to stay there for approximately two months. Dr. Horton ordered IV antibiotic for two more weeks and new wound care dressing, " orders followed.     4/9/2019: Clinic visit with Dr. Horton.  Per Dr. Horton, wound deep measurement increased, draining present. Wound cultures from abdominal wound taken and sent to lab/micro, pending results. C/o abdominal pain and itching around wound, Rx for betamethasone cream 01.% and Norco 5/325mg give in clinic by Dr. Horton. Wound care dressing orders followed. Continue IV antibiotic as previously ordered.  04/17/19: F/u for non-healing wound to abdomen. Culture report negative. Dr. Horton ordered 1 more week of IV antibiotics. Continue with topical wound care as ordered.     4/24/2019: Clinic visit with Dr. Horton for abdominal wound treatment. Wound improvement present, pictures on file. Last IV antibiotic today 4/24/2019, [facility] to discontinue PICC line from left upper arm on 4/25/2019. Per Lyubov Prince, going to [place] on Friday 4/26/2019 and out of the country ([place]) on Monday 4/29/2019. This nurse spoke to Shaunna at [employer] (IV antibiotic delivery company) and informed last dose of antibiotic is today. Dr. Horton ordered Hydrocodone-acetaminophen (Norco) 5-325 mg (32 tabs), Bentyl 10 mg (120 capsules) and Gentamycin ointment. Education provided on the importance of eating food that contains iron (to prevent anemia) as well as eating meat and taking her blood pressure medications (blood pressure medication) on time, voices understanding. All questions answered by Dr. Horton. [facility] outpatient confirmed Betamethasone cream ordered on 4/10/2019 is ready for . Instructions and education provided to Mrs. Morrison on abdominal wound dressing changes, hand washing techniques and medication use, effects and side effects, voices and demonstrates understanding. Discharged home on stable conditions, [facility] will give wound care clinic a call when she is back from [place] in few months from now.     7/17/19: Readmit today to wound care clinic.  Patient was recently out  of the country visiting family in [place].  Patient complains of pain and nausea as on prior visits.  Dr. Horton seen patient today discussed with patient again placing colostomy patient refused.  Culture of wound taken, Dr. Horton restarted patient on zofran po for nausea, and Norco for pain. [facility] restarted today in clinic on Mondays and Fridays and prn. Orders given to gently pack wound with aquacel ag rope, cover with aquacel extra, 4x4 gauze, small abd pad and mefix tape change dressing ever other day by [facility] and Prn per patient.     7/24/2019: Clinic visit with Dr. Horton. Continue with dressing change as ordered. Wound cultures reviewed with patient, lab work ordered by Dr. Horton, no fever present or reported at this time. C/o decrease appetite, medication periactin prescribed. Requesting needles for insulin pen, order prescribed by Dr. Horton.      7/31/2019: F/U clinic visit with Dr. Horton. C/o of excruciated back pain 10/10, not feeling well, lot of gas and left upper quadrant discomfort. New orders prescribed: Augmentin 875-125 mg by mouth twice a day. Bentyl 10 mg one tab by mouth 4 times a day. Tramadol 50 mg one tab by mouth every 6 hours as needed for pain. Abdomen ultrasound. Wound care dressing completed as ordered.     8/7/2019: Clinic visit with Dr. Horton, denies any pain at this time. Wound care dressing done as ordered, no changes in wound size from last visit noted. Continue taking oral antiiotic as ordered, pending abdominal ultrasound, scheduled for 8/13/2019.     8/14/2019: F/U clinic visit with Dr. Horton. Admitted and discharged from emergency department this morning with abdominal pain, CT and ultrasound in filed. Dr. Horton performed a small drainage from the abdominal wound, moderate amount of creamy, serosanguineous fluid from abdominal wound present. Iodoform gauze packed into her wound, dressing changed as ordered. Wound cultures collected and sent  to lab/micro, pending results. Rx for Norco 5/325 mg one tab PO every 4 hours PRN as needed # 24 given to pt by Dr. Horton.      8/21/2019: Clinic visit with Dr. Horton. Wound dressing changed as ordered.      11/6/19: Follow up appt with Dr Horton. Wounds improving slowly. Continues to take antibiotics (Augmentin 875-125mg). No complaints voiced. Follow up in 1 week.  11/13/19:  F/U clinic visit with Dr. Horton.  Wound depth per Dr. Horton is 8.5 cm.  Continue PT Augmentin.  Patient is co left sided abdominal pain.  Abdominal US and labs ordered.  Wound care tolerated well.  Fu with Dr. Horton in 1 week.     11/20/19:Dr Horton assessed patient. Silver nitrate applied to wound. Continuing present plan of care. Patient is scheduled to have abdominal ultra sound Friday 11/22/19. Continues to take Augmentin.  F/U in 1 week.    11/27/19: Follow up in clinic with Dr. Horton. Wound depth 8.5cm, silver nitrate x 3 per Dr. Horton, patient continues taking po Augmentin, c/o left sided abdominal pain, ultrasound results discussed with patient, Dr. Horton will watch for now.  Follow up 1 week 12/4/19.     12/04/19: F/u Dr. Horton. Continue with current wound care treatment. Rx given for Bentyl 10 mg. Patient to follow up in  1 week at wound clinic  12/11/19: F/u with . Continue with current wound care treatment. Rx given for Lotrisone cream to be applied daily per patient to right lower leg rash. Patient to follow up in  1 week at wound clinic    12/18/19: F/u with Dr. Horton. Patient c/o pain 9 out of 10  to abdomen LLQ. Area of pain is warm to touch, pink, and a small nodule can be felt. Patient states that symptoms began about 4 days ago. Stat CT scan ordered. Patient will have CT done tomorrow due to not fasting this morning. Instructed patient to fast before CT scan. Continue with current wound care treatment     01/08/2020: F/u with Dr. Horton. Patient had I&D on 12/22/19 for abscess to abdomen.  New wound care orders given. Patient continues on po abx and home health for wound care. Follow up in 1 week at wound care clinic    01/15/20: F/u with Dr. Horton. Patient c/o mild discomfort at wound to left abdomen. Patient denies any fever, chills, n&v, diarrhea, and/or constipation. Will continue to monitor. Continue with current wound care treatment and follow up in  Wound clinic in 1 week.  1/29/2020: Fu today in wound care clinic with .  Wounds improving slowly.  Silver nitrate x 1 to each wound per Dr. Horton patient tolerated well.  RX give to patient today for Amoxicillin po, Bentyl Po and Norco 5/325mg Po today in clinic.  Fu 1 week 2/5/2020 with Dr. Horton.  2/5/20: F/U with Dr. Horton. LUQ site resolved. Midline site same. Cont. POC.    Review of Systems   Constitutional: Negative.    HENT: Negative.    Eyes: Negative.    Respiratory: Negative.    Cardiovascular: Negative.    Gastrointestinal: Negative.    Genitourinary: Negative.    Musculoskeletal: Negative.    Skin: Negative.    Neurological: Negative.    Psychiatric/Behavioral: Negative.        Objective:      Physical Exam   Constitutional: She is oriented to person, place, and time. She appears well-developed and well-nourished.   HENT:   Head: Normocephalic.   Eyes: Pupils are equal, round, and reactive to light. Conjunctivae and EOM are normal.   Neck: Normal range of motion. Neck supple.   Cardiovascular: Normal rate, regular rhythm, normal heart sounds and intact distal pulses.   Pulmonary/Chest: Effort normal and breath sounds normal.   Abdominal: Soft. Bowel sounds are normal.   Musculoskeletal: Normal range of motion.   Neurological: She is alert and oriented to person, place, and time. She has normal reflexes.   Skin: Skin is warm and dry.       Assessment:       1. Abscess of abdominal wall           Wound 02/01/20 1353 Ulceration lower Abdomen (Active)   02/01/20 1353    Pre-existing: Yes   Primary Wound Type: Ulceration    Side:    Orientation: lower   Location: Abdomen   Wound/PI Number (optional):    Ankle-Brachial Index:    Pulses:    Removal Indication and Assessment:    Wound Outcome:    (Retired) Wound Type:    (Retired) Wound Length (cm):    (Retired) Wound Width (cm):    (Retired) Depth (cm):    Wound Description (Comments):    Removal Indications:    Dressing Appearance Moist drainage 2/5/2020  9:00 AM   Drainage Characteristics/Odor Yellow 2/5/2020  9:00 AM   Tissue loss description Full thickness 2/5/2020  9:00 AM   Care Cleansed with:;Sterile normal saline 2/5/2020  9:00 AM   Dressing Changed;Gauze;Abd pad;Other (see comments) 2/5/2020  9:00 AM   Periwound Care Absorptive dressing applied 2/5/2020  9:00 AM   Dressing Change Due 02/07/20 2/5/2020  9:00 AM         Abdominal wound midline  Clean wound with normal saline  Lidocaine 2% gel or 4 % Topical solution: PRN  Danielle wound:  Maintain dry danielle wound, Cavilon under tape, betamethasone PRN itching  Primary dressing: Apply gentamycin to wound bed   Secondary dressing: Island border dressing, or  cover with 4x4 gauze, small ABD pad, secure with mefix tape if Island border does not stick.  Frequency: Mon, Wed, Fri        Follow up with Dr. Horton in 1 week on Wednesday 2/12/2020  [facility]:  Home Health nurse to assess wound and perform wound care on Mon, Fri, and PRN. Patient requesting that home health nurse Genna come out to do her wound care                Plan:                2/12/2020

## 2020-02-06 LAB
BACTERIA BLD CULT: NORMAL
BACTERIA BLD CULT: NORMAL

## 2020-02-12 ENCOUNTER — HOSPITAL ENCOUNTER (OUTPATIENT)
Dept: WOUND CARE | Facility: HOSPITAL | Age: 70
Discharge: HOME | End: 2020-02-12
Attending: SURGERY
Payer: MEDICARE

## 2020-02-12 VITALS
WEIGHT: 164 LBS | BODY MASS INDEX: 27.32 KG/M2 | SYSTOLIC BLOOD PRESSURE: 145 MMHG | DIASTOLIC BLOOD PRESSURE: 81 MMHG | TEMPERATURE: 98 F | HEART RATE: 70 BPM | HEIGHT: 65 IN

## 2020-02-12 DIAGNOSIS — R10.32 LLQ PAIN: ICD-10-CM

## 2020-02-12 DIAGNOSIS — L02.211 ABSCESS OF ABDOMINAL WALL: Primary | ICD-10-CM

## 2020-02-12 DIAGNOSIS — L08.9 LOCAL INFECTION OF SKIN AND SUBCUTANEOUS TISSUE: ICD-10-CM

## 2020-02-12 DIAGNOSIS — K63.2 COLOCUTANEOUS FISTULA: ICD-10-CM

## 2020-02-12 DIAGNOSIS — Z98.890 S/P EXPLORATORY LAPAROTOMY: ICD-10-CM

## 2020-02-12 PROCEDURE — 99213 OFFICE O/P EST LOW 20 MIN: CPT

## 2020-02-12 NOTE — PROGRESS NOTES
"Subjective:       Patient ID: Azucena Morrison is a 69 y.o. female.    Chief Complaint: Non-healing Wound (abdominal)    6/29/17: Pt re-admit for distal abdominal wound. Pt denies any fevers or chills but states she feels nauseous at times. Returned to [place] June 28, from [place], reports much trouble with abdominal wound while in [place].  7/6/17-- Patient scheduled for surgical drainage of wound Tuesday 7/11/17DB  7/19/17-- Patient post op clinic visit.  8/16/17: CT of abdomen and pelvis done 8/9/17 due to suspected fistula  8/23/17-- U/S of abdomen done today.  12/13/17 Wound vac with 20cc drainage in clinic today.  1/10/18: on PO abx  1/24/18: Fistulagram ordered per Dr. Horton. Patient still on PO abx  02/21/18 Patient is not on antibiotics at this time. BS fasting 135 per patient report this am.  03/07/18 Culture of Anterior Abdominal Wound is positive. No antibiotics at this time.  fasting per patient report.    03/21/18 Patient c/o nausea along with abdominal tenderness near abd midline wound. Patient states that pain level is 6 and that she passed two sero-sanguineous clots from wound. Patient is afebrile this am.  3/28/18: patient continue antibiotics and reports that pain is less than last weeks clinic visit. Drainage has decreased as well  04/04/18 Patient states that abdominal pain is "pressure" sensation and that when she pushes down on her abdomen on either side of abdominal wound she feels like she has to urinate. Patient reports pain level of 3 this am.  fasting this am per patient report.   5/2/18: Patient had Abdomina Toribio today before wound care. She had discontinued Bactrim PO per Dr. Horton's recommendation and culture results and is now taking Amoxicillin PO  6/20/18: Pt reports itching to wound and periwound   6/27/18: patient reports no new complaints with regards to her wound or abdomen, wound continues to decrease in size and drainage  8/1/18: Pt reports increased pain to " abdomen with nausea and emesis since thursday 7/26/18, denies any fevers, patient did no go to ER as instructed by home health nurse on Sat. 7/28/18.  8/29/18: Patient admitted to hospital 8/2 for abdominal wound complications. Surgery for abdominal abscess per Dr. Horton on 8/3. Patient placed on IV antibiotics and discharged home on 8/18 with [facility]  and PICC line to LUE. Patient currently on IV ertapenem. IV medication sent to patients home per [facility].   9/5/18: IV Ertapenem to continue 1 week. Culture sent to lab. [facility]  sent orders. PICC line intact to E.  9/12/18 Antibiotics completed. Culture results pending. 1 deep stitch remaining.  9/12/18: Culture positive for E. Coli, patient to continue Ertapenem IV antibiotics x 2weeks. [facility] notified  9/26/18: F/U abdominal wound, wound continues to improve. Patient will complete IV antibiotics today  10/3/18: patient c/o diarrhea for 2 days and nausea with some vomiting. Labs and stool culture ordered. IV antibiotics discontinued today  10/10/2018 patient will start on IV antibiotic Invaz IV once a day, pending PICC line placement, order placed today  for PICC per Dr. Horton.  10/24/18: patient on IV antibiotics, tolerating well. Continue for 1 week  11/7/2018: IV Antibiotic discontinued.  11/8/2018: PICC line to left upper arm discontinued by Home Health.  11/21/2018 F/u for abdominal wall fistula , c/o nausea  No vomiting , no fever  12/5/2018: F/u for abdominal wall fisula, c/o gas, Dr. Horton will order Bentyl. Surgery schedule for January 2019.  12/12/2018: F/u for abdominal wall fistula, c/o abdominal pain. Dr. Horton ordered Norco 5/325mg tab 1 PO every 4 hours as needed for pain and referred patient for x-ray of abdomen after clinic today.  12/19/2018: F/u for abdominal wall fisula, c/o nausea, Dr. Horton re ordered Ondansetron (Zofran) 8mg one tab by mouth every eight hours as needed for nausea. No changes in wound  condition from last visit noted in clinic today.  12/26/2018: F/u abdominal wall fistula drainage, no c/o at this time. Denies any abdominal pain or nausea. Dr. Horton recommends surgery first week of January 2019 for abdominal fistula treatment and possible colostomy placement, patient agree.  01/02/2019: F/u abdominal wall fistula. Dressing with large amount of drainage, malodorous, Dr. Horton is scheduling surgery for third week of January, 2019.  1/16/19: F/U Dr. Horton today in clinic, surgery scheduled for next Friday 1/25/19 with Dr. Horton.     01/23/2019: Presents to wound care clinic for f/u abdominal wound care tx. Surgery scheduled with Dr. Horton for Friday 01/25/2019. Dr. Horton explained Mrs. Morrison surgery procedure including possible complications, Nurse  (Ethiopian) present, patient verbalizes understanding of procedure including possible complications, all questions from patient were answered by Dr. Horton including blood sugar and blood pressure medications per patient's concerns. Consent for surgery and blood transfusion signed by patient, Dr. Horton and nurse witnessed.  Abdominal wound cultures collected per Dr. Horton, cultures sent to lab/micro pending results. Dr. Horton prescribed the following orders: fleet enema for Thursday 1/24/2019, clear liquid diet and not to eat after midnight all for 1/24/2019. Start taking Neomycin and erythromycin by mouth three times a day (1/24/2019) and dulcolax one tab by mouth twice a day, Mrs. Morrison voices understanding.     01/30/2019: F/U wound care treatment with Dr. Horton. Per pt, primary physician Dr. Pj Monae ordered for her to take potassium pills for three days, last day today and scheduled for lab work at primary physician clinic on 1/31/2019. Per pt. Feeling better, no more abdominal pain (went to ER 1/24/2019 and discharged 1/25/2019 c/o abd pain.). Dr. Horton awaiting on primary clearance to re-schedule surgery, per  "pt. She will call wound care clinic and Dr. Horton to make aware of clearance day. Continue with same orders for dressing change for Dr. Horton. Mrs. Morrison requesting gentamicin refill.     02/06/2019: F/u with Dr. Horton for wound care treatment. Mrs. Morrison brought to clinic a clearance for surgery by Dr. Pj Sanches dated 02/06/2019 "Hyperkalemia-Resolved K 4.8 2/1/2019, labs , Cr. 1.05, H/H 10/30. No contraindication to surgery, tight control of BS, Hydration." A copy of EKG (1/24/2019) and lab work from [employer] collected 1/31/2019, reported results 2/1/2019. Dr. Horton scheduled surgery for 02/22/2019, consent were signed on 01/23/2019, all questions were answered by Dr. Horton, this nurse  (Qatari) present. Education provided to patient on the importance of having a clear liquid diet the day before surgery 02/21/2019 as per Dr. Horton, new medications and preop preparation before surgery, verbalizes understanding. Dr. Horton ordered Norco 5/325 mg PO for pain, Dulcolax two tabs by mouth to take on 02/21/2019, Soap suds enema (SSE) on Thursday 02/21/2019, Golytely by mouth 2 liters on 2/21/2019, Erythromycin 500 mg one tab by mouth three times a day and Neomycin 1 gm by mouth three times a day.     2/13/2019: Presents to wound care clinic for a f/u with Dr. Horton for wound care treatment. No new orders in wound dressing change, reviewed preop orders with Mrs. Morrison per Dr. Horton, all questions answered. Surgery scheduled for 02/22/2019.  2/20/19: Continues wound care a previously ordered.  Depth measured per Dr. Horton 8cm.  Preop orders reviewed with patient who verbalized understanding.  Surgery scheduled for Fri 2/22/19.  Rx given to patient for Norco and Zofran.    03/06/19: Patient admitted to [facility] on 02/22/19 for exploratory laparotomy of abdomen per Dr. Horton. Patient discharged on 02/28/19 with home health and PICC line with IV antibiotics. " "Staples removed today in clinic. Patient denies any fever, chills, n&v; however, she states that she has "low energy." Continued with wound care as ordered.     3/13/2019: F/U with Dr. Horton for wound care treatment. Continue with same wound dressing change orders as per Dr. Horton, orders followed. Home Health to continue wound dressing change and lab draw for CBC weekly; continue IV Ertapenem/Invaz 1 gm IV daily as ordered.     3/20/2019: Clinic visit with Dr. Horton for abdominal abscess treatment. Presents with moderate draining from abdominal wound; wound size decreasing per measurement. Per Dr. Horton, apply one-piece system ostomy bag to abdominal wound for drain collection, may drain bag when it fills and may change every other day, continue applying gentamicin to wound bed before applying ostomy bag. Continue IV Invaz until completion. If ostomy bag not effective for draining collection, may return to previous orders for wound dressing change. Orders followed. Education provided to Mrs. Morrison on handwashing and ostomy care techniques, voices and demonstrates understanding.   3/27/19: Follow up with Dr. Horton today in clinic for abdominal abscess, moderated tanish yellow drainge present on dressing.  Wound slowly improving, patient refused one-piece ostomy bag stated " no it's too messy." requested to return to previous dressing prior to ostomy bag placement- iodoform gauze, aquacel extra, sm abd, and mefix tape.  Silver nitrate x1 per Dr. Horton today in clinic. Rx given to patient for PO Zofran.     04/03/2019: F/u clinic visit with Dr. Horton. Abdominal wound improving in size, picture on file. Wound cultures from abdominal abscess collected and sent to lab/micro, pending results. Mrs. Morrison states going out of the country ([place]) at the end of April and plans to stay there for approximately two months. Dr. Horton ordered IV antibiotic for two more weeks and new wound care dressing, " orders followed.     4/9/2019: Clinic visit with Dr. Horton.  Per Dr. Horton, wound deep measurement increased, draining present. Wound cultures from abdominal wound taken and sent to lab/micro, pending results. C/o abdominal pain and itching around wound, Rx for betamethasone cream 01.% and Norco 5/325mg give in clinic by Dr. Horton. Wound care dressing orders followed. Continue IV antibiotic as previously ordered.  04/17/19: F/u for non-healing wound to abdomen. Culture report negative. Dr. Horton ordered 1 more week of IV antibiotics. Continue with topical wound care as ordered.     4/24/2019: Clinic visit with Dr. Horton for abdominal wound treatment. Wound improvement present, pictures on file. Last IV antibiotic today 4/24/2019, [facility] to discontinue PICC line from left upper arm on 4/25/2019. Per Lyubov Prince, going to [place] on Friday 4/26/2019 and out of the country ([place]) on Monday 4/29/2019. This nurse spoke to Shaunna at [employer] ([facility] delivery company) and informed last dose of antibiotic is today. Dr. Horton ordered Hydrocodone-acetaminophen (Norco) 5-325 mg (32 tabs), Bentyl 10 mg (120 capsules) and Gentamycin ointment. Education provided on the importance of eating food that contains iron (to prevent anemia) as well as eating meat and taking her blood pressure medications (blood pressure medication) on time, voices understanding. All questions answered by Dr. Horton. [facility] outpatient confirmed Betamethasone cream ordered on 4/10/2019 is ready for . Instructions and education provided to Mrs. Morrison on abdominal wound dressing changes, hand washing techniques and medication use, effects and side effects, voices and demonstrates understanding. Discharged home on stable conditions, [facility] will give wound care clinic a call when she is back from [place] in few months from now.     7/17/19: Readmit today to wound care clinic.  Patient was recently out  of the country visiting family in [place].  Patient complains of pain and nausea as on prior visits.  Dr. Horton seen patient today discussed with patient again placing colostomy patient refused.  Culture of wound taken, Dr. Horton restarted patient on zofran po for nausea, and Norco for pain. [facility] restarted today in clinic on Mondays and Fridays and prn. Orders given to gently pack wound with aquacel ag rope, cover with aquacel extra, 4x4 gauze, small abd pad and mefix tape change dressing ever other day by [facility] and Prn per patient.     7/24/2019: Clinic visit with Dr. Horton. Continue with dressing change as ordered. Wound cultures reviewed with patient, lab work ordered by Dr. Horton, no fever present or reported at this time. C/o decrease appetite, medication periactin prescribed. Requesting needles for insulin pen, order prescribed by Dr. Horton.      7/31/2019: F/U clinic visit with Dr. Horton. C/o of excruciated back pain 10/10, not feeling well, lot of gas and left upper quadrant discomfort. New orders prescribed: Augmentin 875-125 mg by mouth twice a day. Bentyl 10 mg one tab by mouth 4 times a day. Tramadol 50 mg one tab by mouth every 6 hours as needed for pain. Abdomen ultrasound. Wound care dressing completed as ordered.     8/7/2019: Clinic visit with Dr. Horton, denies any pain at this time. Wound care dressing done as ordered, no changes in wound size from last visit noted. Continue taking oral antiiotic as ordered, pending abdominal ultrasound, scheduled for 8/13/2019.     8/14/2019: F/U clinic visit with Dr. Horton. Admitted and discharged from emergency department this morning with abdominal pain, CT and ultrasound in filed. Dr. Horton performed a small drainage from the abdominal wound, moderate amount of creamy, serosanguineous fluid from abdominal wound present. Iodoform gauze packed into her wound, dressing changed as ordered. Wound cultures collected and sent  to lab/micro, pending results. Rx for Norco 5/325 mg one tab PO every 4 hours PRN as needed # 24 given to pt by Dr. Horton.      8/21/2019: Clinic visit with Dr. Horton. Wound dressing changed as ordered.      11/6/19: Follow up appt with Dr Horton. Wounds improving slowly. Continues to take antibiotics (Augmentin 875-125mg). No complaints voiced. Follow up in 1 week.  11/13/19:  F/U clinic visit with Dr. Horton.  Wound depth per Dr. Horton is 8.5 cm.  Continue PT Augmentin.  Patient is co left sided abdominal pain.  Abdominal US and labs ordered.  Wound care tolerated well.  Fu with Dr. Horton in 1 week.     11/20/19:Dr Horton assessed patient. Silver nitrate applied to wound. Continuing present plan of care. Patient is scheduled to have abdominal ultra sound Friday 11/22/19. Continues to take Augmentin.  F/U in 1 week.    11/27/19: Follow up in clinic with Dr. Horton. Wound depth 8.5cm, silver nitrate x 3 per Dr. Horton, patient continues taking po Augmentin, c/o left sided abdominal pain, ultrasound results discussed with patient, Dr. Horton will watch for now.  Follow up 1 week 12/4/19.     12/04/19: F/u Dr. Horton. Continue with current wound care treatment. Rx given for Bentyl 10 mg. Patient to follow up in  1 week at wound clinic  12/11/19: F/u with . Continue with current wound care treatment. Rx given for Lotrisone cream to be applied daily per patient to right lower leg rash. Patient to follow up in  1 week at wound clinic    12/18/19: F/u with Dr. Horton. Patient c/o pain 9 out of 10  to abdomen LLQ. Area of pain is warm to touch, pink, and a small nodule can be felt. Patient states that symptoms began about 4 days ago. Stat CT scan ordered. Patient will have CT done tomorrow due to not fasting this morning. Instructed patient to fast before CT scan. Continue with current wound care treatment     01/08/2020: F/u with Dr. Horton. Patient had I&D on 12/22/19 for abscess to abdomen.  New wound care orders given. Patient continues on po abx and home health for wound care. Follow up in 1 week at wound care clinic    01/15/20: F/u with Dr. Horton. Patient c/o mild discomfort at wound to left abdomen. Patient denies any fever, chills, n&v, diarrhea, and/or constipation. Will continue to monitor. Continue with current wound care treatment and follow up in  Wound clinic in 1 week.  1/29/2020: Fu today in wound care clinic with .  Wounds improving slowly.  Silver nitrate x 1 to each wound per Dr. Horton patient tolerated well.  RX give to patient today for Amoxicillin po, Bentyl Po and Norco 5/325mg Po today in clinic.  Fu 1 week 2/5/2020 with Dr. Horton.  2/5/20: F/U with Dr. Horton. LUQ site resolved. Midline site same. Cont. POC.  2/12/20: F/U with Dr. Horton. LUQ site and midline probed by Dr. Horton. Cont. Augmentin. Rx for Norco provided. Cont. POC.    Review of Systems   Constitutional: Negative.    HENT: Negative.    Eyes: Negative.    Respiratory: Negative.    Cardiovascular: Negative.    Gastrointestinal: Negative.    Genitourinary: Negative.    Musculoskeletal: Negative.    Skin: Negative.    Neurological: Negative.    Psychiatric/Behavioral: Negative.        Objective:      Physical Exam   Constitutional: She is oriented to person, place, and time. She appears well-developed and well-nourished.   HENT:   Head: Normocephalic.   Eyes: Pupils are equal, round, and reactive to light. Conjunctivae and EOM are normal.   Neck: Normal range of motion. Neck supple.   Cardiovascular: Normal rate, regular rhythm, normal heart sounds and intact distal pulses.   Pulmonary/Chest: Effort normal and breath sounds normal.   Abdominal: Soft. Bowel sounds are normal.   Musculoskeletal: Normal range of motion.   Neurological: She is alert and oriented to person, place, and time. She has normal reflexes.   Skin: Skin is warm and dry.       Assessment:       1. Abscess of abdominal wall            Wound 02/01/20 1353 Ulceration lower Abdomen (Active)   02/01/20 1353    Pre-existing: Yes   Primary Wound Type: Ulceration   Side:    Orientation: lower   Location: Abdomen   Wound/PI Number (optional):    Ankle-Brachial Index:    Pulses:    Removal Indication and Assessment:    Wound Outcome:    (Retired) Wound Type:    (Retired) Wound Length (cm):    (Retired) Wound Width (cm):    (Retired) Depth (cm):    Wound Description (Comments):    Removal Indications:    Dressing Appearance Moist drainage 2/12/2020  9:45 AM   Drainage Amount Small 2/12/2020  9:45 AM   Drainage Characteristics/Odor Yellow;Creamy 2/12/2020  9:45 AM   Appearance Red;Moist 2/12/2020  9:45 AM   Tissue loss description Full thickness 2/12/2020  9:45 AM   Red (%), Wound Tissue Color 100 % 2/12/2020  9:45 AM   Periwound Area Intact;Dry 2/12/2020  9:45 AM   Wound Edges Open 2/12/2020  9:45 AM   Wound Length (cm) 0.3 cm 2/12/2020  9:45 AM   Wound Width (cm) 0.4 cm 2/12/2020  9:45 AM   Wound Depth (cm) 2.2 cm 2/12/2020  9:45 AM   Wound Volume (cm^3) 0.26 cm^3 2/12/2020  9:45 AM   Wound Surface Area (cm^2) 0.12 cm^2 2/12/2020  9:45 AM   Care Cleansed with:;Sterile normal saline;Other (see comments) 2/12/2020  9:45 AM   Dressing Changed;Gauze;Abd pad;Other (see comments) 2/12/2020  9:45 AM   Periwound Care Absorptive dressing applied 2/12/2020  9:45 AM   Dressing Change Due 02/14/20 2/12/2020  9:45 AM         Abdominal wound midline and LUQ site:  Clean wound with normal saline  Lidocaine 2% gel or 4 % Topical solution: PRN  Chauncey wound:  Maintain dry chauncey wound, Cavilon under tape, betamethasone PRN itching  Primary dressing: Apply gentamycin to wound bed   Secondary dressing: Island border dressing, or  cover with 4x4 gauze, small ABD pad, secure with mefix tape if Island border does not stick.  Frequency: Mon, Wed, Fri        Follow up with Dr. Horton in 1 week on Wednesday 2/19/2020  Home Health:  [facility]:  Home Health nurse to assess  wound and perform wound care on Mon, Fri, and PRN. Patient requesting that home health nurse Genna come out to do her wound care                Plan:                2/19/2020

## 2020-02-19 ENCOUNTER — HOSPITAL ENCOUNTER (OUTPATIENT)
Dept: WOUND CARE | Facility: HOSPITAL | Age: 70
Discharge: HOME | End: 2020-02-19
Attending: SURGERY
Payer: MEDICARE

## 2020-02-19 VITALS
TEMPERATURE: 98 F | SYSTOLIC BLOOD PRESSURE: 135 MMHG | HEIGHT: 65 IN | DIASTOLIC BLOOD PRESSURE: 74 MMHG | BODY MASS INDEX: 27.32 KG/M2 | WEIGHT: 164 LBS | HEART RATE: 78 BPM

## 2020-02-19 DIAGNOSIS — E11.622 TYPE 2 DIABETES MELLITUS WITH OTHER SKIN ULCER, WITH LONG-TERM CURRENT USE OF INSULIN: ICD-10-CM

## 2020-02-19 DIAGNOSIS — Z98.890 S/P EXPLORATORY LAPAROTOMY: ICD-10-CM

## 2020-02-19 DIAGNOSIS — I10 ESSENTIAL HYPERTENSION: ICD-10-CM

## 2020-02-19 DIAGNOSIS — L02.211 ABSCESS OF ABDOMINAL WALL: Primary | ICD-10-CM

## 2020-02-19 DIAGNOSIS — K63.2 FISTULA OF INTESTINE, EXCLUDING RECTUM AND ANUS: ICD-10-CM

## 2020-02-19 DIAGNOSIS — Z79.4 TYPE 2 DIABETES MELLITUS WITH OTHER SKIN ULCER, WITH LONG-TERM CURRENT USE OF INSULIN: ICD-10-CM

## 2020-02-19 DIAGNOSIS — K63.2 COLOCUTANEOUS FISTULA: ICD-10-CM

## 2020-02-19 PROCEDURE — 87075 CULTR BACTERIA EXCEPT BLOOD: CPT

## 2020-02-19 PROCEDURE — 87070 CULTURE OTHR SPECIMN AEROBIC: CPT

## 2020-02-19 PROCEDURE — 87077 CULTURE AEROBIC IDENTIFY: CPT | Mod: 59

## 2020-02-19 PROCEDURE — 99213 OFFICE O/P EST LOW 20 MIN: CPT

## 2020-02-19 PROCEDURE — 87076 CULTURE ANAEROBE IDENT EACH: CPT

## 2020-02-19 PROCEDURE — 87186 SC STD MICRODIL/AGAR DIL: CPT | Mod: 59

## 2020-02-19 RX ORDER — ONDANSETRON HYDROCHLORIDE 8 MG/1
TABLET, FILM COATED ORAL
Qty: 30 TABLET | Refills: 2 | Status: ON HOLD | OUTPATIENT
Start: 2020-02-19 | End: 2020-06-30 | Stop reason: SDUPTHER

## 2020-02-19 RX ORDER — AMOXICILLIN AND CLAVULANATE POTASSIUM 875; 125 MG/1; MG/1
TABLET, FILM COATED ORAL
Qty: 30 TABLET | Refills: 2 | Status: SHIPPED | OUTPATIENT
Start: 2020-02-19 | End: 2020-03-11 | Stop reason: SDUPTHER

## 2020-02-19 NOTE — PROGRESS NOTES
"Subjective:       Patient ID: Azucena Morrison is a 69 y.o. female.    Chief Complaint: Non-healing Wound (abdomen)    6/29/17: Pt re-admit for distal abdominal wound. Pt denies any fevers or chills but states she feels nauseous at times. Returned to [place] June 28, from [place], reports much trouble with abdominal wound while in [place].  7/6/17-- Patient scheduled for surgical drainage of wound Tuesday 7/11/17DB  7/19/17-- Patient post op clinic visit.  8/16/17: CT of abdomen and pelvis done 8/9/17 due to suspected fistula  8/23/17-- U/S of abdomen done today.  12/13/17 Wound vac with 20cc drainage in clinic today.  1/10/18: on PO abx  1/24/18: Fistulagram ordered per Dr. Horton. Patient still on PO abx  02/21/18 Patient is not on antibiotics at this time. BS fasting 135 per patient report this am.  03/07/18 Culture of Anterior Abdominal Wound is positive. No antibiotics at this time.  fasting per patient report.    03/21/18 Patient c/o nausea along with abdominal tenderness near abd midline wound. Patient states that pain level is 6 and that she passed two sero-sanguineous clots from wound. Patient is afebrile this am.  3/28/18: patient continue antibiotics and reports that pain is less than last weeks clinic visit. Drainage has decreased as well  04/04/18 Patient states that abdominal pain is "pressure" sensation and that when she pushes down on her abdomen on either side of abdominal wound she feels like she has to urinate. Patient reports pain level of 3 this am.  fasting this am per patient report.   5/2/18: Patient had Abdomina Toribio today before wound care. She had discontinued Bactrim PO per Dr. Horton's recommendation and culture results and is now taking Amoxicillin PO  6/20/18: Pt reports itching to wound and periwound   6/27/18: patient reports no new complaints with regards to her wound or abdomen, wound continues to decrease in size and drainage  8/1/18: Pt reports increased pain to " abdomen with nausea and emesis since thursday 7/26/18, denies any fevers, patient did no go to ER as instructed by home health nurse on Sat. 7/28/18.  8/29/18: Patient admitted to hospital 8/2 for abdominal wound complications. Surgery for abdominal abscess per Dr. Horton on 8/3. Patient placed on IV antibiotics and discharged home on 8/18 with [facility]  and PICC line to LUE. Patient currently on IV ertapenem. IV medication sent to patients home per [facility].   9/5/18: IV Ertapenem to continue 1 week. Culture sent to lab. [facility]  sent orders. PICC line intact to E.  9/12/18 Antibiotics completed. Culture results pending. 1 deep stitch remaining.  9/12/18: Culture positive for E. Coli, patient to continue Ertapenem IV antibiotics x 2weeks. [facility] notified  9/26/18: F/U abdominal wound, wound continues to improve. Patient will complete IV antibiotics today  10/3/18: patient c/o diarrhea for 2 days and nausea with some vomiting. Labs and stool culture ordered. IV antibiotics discontinued today  10/10/2018 patient will start on IV antibiotic Invaz IV once a day, pending PICC line placement, order placed today  for PICC per Dr. Horton.  10/24/18: patient on IV antibiotics, tolerating well. Continue for 1 week  11/7/2018: IV Antibiotic discontinued.  11/8/2018: PICC line to left upper arm discontinued by Home Health.  11/21/2018 F/u for abdominal wall fistula , c/o nausea  No vomiting , no fever  12/5/2018: F/u for abdominal wall fisula, c/o gas, Dr. Horton will order Bentyl. Surgery schedule for January 2019.  12/12/2018: F/u for abdominal wall fistula, c/o abdominal pain. Dr. Horton ordered Norco 5/325mg tab 1 PO every 4 hours as needed for pain and referred patient for x-ray of abdomen after clinic today.  12/19/2018: F/u for abdominal wall fisula, c/o nausea, Dr. Horton re ordered Ondansetron (Zofran) 8mg one tab by mouth every eight hours as needed for nausea. No changes in wound  condition from last visit noted in clinic today.  12/26/2018: F/u abdominal wall fistula drainage, no c/o at this time. Denies any abdominal pain or nausea. Dr. Horton recommends surgery first week of January 2019 for abdominal fistula treatment and possible colostomy placement, patient agree.  01/02/2019: F/u abdominal wall fistula. Dressing with large amount of drainage, malodorous, Dr. Horton is scheduling surgery for third week of January, 2019.  1/16/19: F/U Dr. Horton today in clinic, surgery scheduled for next Friday 1/25/19 with Dr. Horton.     01/23/2019: Presents to wound care clinic for f/u abdominal wound care tx. Surgery scheduled with Dr. Horton for Friday 01/25/2019. Dr. Horton explained Mrs. Morrison surgery procedure including possible complications, Nurse  ([place]) present, patient verbalizes understanding of procedure including possible complications, all questions from patient were answered by Dr. Horton including blood sugar and blood pressure medications per patient's concerns. Consent for surgery and blood transfusion signed by patient, Dr. Horton and nurse witnessed.  Abdominal wound cultures collected per Dr. Horton, cultures sent to lab/micro pending results. Dr. Horton prescribed the following orders: fleet enema for Thursday 1/24/2019, clear liquid diet and not to eat after midnight all for 1/24/2019. Start taking Neomycin and erythromycin by mouth three times a day (1/24/2019) and dulcolax one tab by mouth twice a day, Mrs. Morrison voices understanding.     01/30/2019: F/U wound care treatment with Dr. Horton. Per pt, primary physician Dr. Pj Monae ordered for her to take potassium pills for three days, last day today and scheduled for lab work at primary physician clinic on 1/31/2019. Per pt. Feeling better, no more abdominal pain (went to ER 1/24/2019 and discharged 1/25/2019 c/o abd pain.). Dr. Horton awaiting on primary clearance to re-schedule surgery, per  "pt. She will call wound care clinic and Dr. Horton to make aware of clearance day. Continue with same orders for dressing change for Dr. Horton. Mrs. Morrison requesting gentamicin refill.     02/06/2019: F/u with Dr. Horton for wound care treatment. Mrs. Morrison brought to clinic a clearance for surgery by Dr. Pj Sanches dated 02/06/2019 "Hyperkalemia-Resolved K 4.8 2/1/2019, labs , Cr. 1.05, H/H 10/30. No contraindication to surgery, tight control of BS, Hydration." A copy of EKG (1/24/2019) and lab work from [facility] collected 1/31/2019, reported results 2/1/2019. Dr. Horton scheduled surgery for 02/22/2019, consent were signed on 01/23/2019, all questions were answered by Dr. Horton, this nurse  ([place]) present. Education provided to patient on the importance of having a clear liquid diet the day before surgery 02/21/2019 as per Dr. Horton, new medications and preop preparation before surgery, verbalizes understanding. Dr. Horton ordered Norco 5/325 mg PO for pain, Dulcolax two tabs by mouth to take on 02/21/2019, Soap suds enema (SSE) on Thursday 02/21/2019, Golytely by mouth 2 liters on 2/21/2019, Erythromycin 500 mg one tab by mouth three times a day and Neomycin 1 gm by mouth three times a day.     2/13/2019: Presents to wound care clinic for a f/u with Dr. Horton for wound care treatment. No new orders in wound dressing change, reviewed preop orders with Mrs. Morrison per Dr. Horton, all questions answered. Surgery scheduled for 02/22/2019.  2/20/19: Continues wound care a previously ordered.  Depth measured per Dr. Horton 8cm.  Preop orders reviewed with patient who verbalized understanding.  Surgery scheduled for Fri 2/22/19.  Rx given to patient for Norco and Zofran.    03/06/19: Patient admitted to [facility] on 02/22/19 for exploratory laparotomy of abdomen per Dr. Horton. Patient discharged on 02/28/19 with home health and PICC line with IV antibiotics. " "Staples removed today in clinic. Patient denies any fever, chills, n&v; however, she states that she has "low energy." Continued with wound care as ordered.     3/13/2019: F/U with Dr. Horton for wound care treatment. Continue with same wound dressing change orders as per Dr. Horton, orders followed. Home Health to continue wound dressing change and lab draw for CBC weekly; continue IV Ertapenem/Invaz 1 gm IV daily as ordered.     3/20/2019: Clinic visit with Dr. Horton for abdominal abscess treatment. Presents with moderate draining from abdominal wound; wound size decreasing per measurement. Per Dr. Horton, apply one-piece system ostomy bag to abdominal wound for drain collection, may drain bag when it fills and may change every other day, continue applying gentamicin to wound bed before applying ostomy bag. Continue IV Invaz until completion. If ostomy bag not effective for draining collection, may return to previous orders for wound dressing change. Orders followed. Education provided to Mrs. Morrison on handwashing and ostomy care techniques, voices and demonstrates understanding.   3/27/19: Follow up with Dr. Horton today in clinic for abdominal abscess, moderated tanish yellow drainge present on dressing.  Wound slowly improving, patient refused one-piece ostomy bag stated " no it's too messy." requested to return to previous dressing prior to ostomy bag placement- iodoform gauze, aquacel extra, sm abd, and mefix tape.  Silver nitrate x1 per Dr. Horton today in clinic. Rx given to patient for PO Zofran.     04/03/2019: F/u clinic visit with Dr. Horton. Abdominal wound improving in size, picture on file. Wound cultures from abdominal abscess collected and sent to lab/micro, pending results. Mrs. Morrison states going out of the country ([place]) at the end of April and plans to stay there for approximately two months. Dr. Horton ordered IV antibiotic for two more weeks and new wound care dressing, " orders followed.     4/9/2019: Clinic visit with Dr. Horton.  Per Dr. Horton, wound deep measurement increased, draining present. Wound cultures from abdominal wound taken and sent to lab/micro, pending results. C/o abdominal pain and itching around wound, Rx for betamethasone cream 01.% and Norco 5/325mg give in clinic by Dr. Horton. Wound care dressing orders followed. Continue IV antibiotic as previously ordered.  04/17/19: F/u for non-healing wound to abdomen. Culture report negative. Dr. Horton ordered 1 more week of IV antibiotics. Continue with topical wound care as ordered.     4/24/2019: Clinic visit with Dr. Horton for abdominal wound treatment. Wound improvement present, pictures on file. Last IV antibiotic today 4/24/2019, [facility] to discontinue PICC line from left upper arm on 4/25/2019. Per Lyubov Prince, going to [place] on Friday 4/26/2019 and out of the country ([place]) on Monday 4/29/2019. This nurse spoke to Shaunna at [employer] (IV antibiotic delivery company) and informed last dose of antibiotic is today. Dr. Horton ordered Hydrocodone-acetaminophen (Norco) 5-325 mg (32 tabs), Bentyl 10 mg (120 capsules) and Gentamycin ointment. Education provided on the importance of eating food that contains iron (to prevent anemia) as well as eating meat and taking her blood pressure medications (blood pressure medication) on time, voices understanding. All questions answered by Dr. Horton. [facility] outpatient confirmed Betamethasone cream ordered on 4/10/2019 is ready for . Instructions and education provided to Mrs. Morrison on abdominal wound dressing changes, hand washing techniques and medication use, effects and side effects, voices and demonstrates understanding. Discharged home on stable conditions, [facility] will give wound care clinic a call when she is back from [place] in few months from now.     7/17/19: Readmit today to wound care clinic.  Patient was recently out  of the country visiting family in [place].  Patient complains of pain and nausea as on prior visits.  Dr. Horton seen patient today discussed with patient again placing colostomy patient refused.  Culture of wound taken, Dr. Horton restarted patient on zofran po for nausea, and Norco for pain. [facility] restarted today in clinic on Mondays and Fridays and prn. Orders given to gently pack wound with aquacel ag rope, cover with aquacel extra, 4x4 gauze, small abd pad and mefix tape change dressing ever other day by [facility] and Prn per patient.     7/24/2019: Clinic visit with Dr. Horton. Continue with dressing change as ordered. Wound cultures reviewed with patient, lab work ordered by Dr. Horton, no fever present or reported at this time. C/o decrease appetite, medication periactin prescribed. Requesting needles for insulin pen, order prescribed by Dr. Horton.      7/31/2019: F/U clinic visit with Dr. Horton. C/o of excruciated back pain 10/10, not feeling well, lot of gas and left upper quadrant discomfort. New orders prescribed: Augmentin 875-125 mg by mouth twice a day. Bentyl 10 mg one tab by mouth 4 times a day. Tramadol 50 mg one tab by mouth every 6 hours as needed for pain. Abdomen ultrasound. Wound care dressing completed as ordered.     8/7/2019: Clinic visit with Dr. Horton, denies any pain at this time. Wound care dressing done as ordered, no changes in wound size from last visit noted. Continue taking oral antiiotic as ordered, pending abdominal ultrasound, scheduled for 8/13/2019.     8/14/2019: F/U clinic visit with Dr. Horton. Admitted and discharged from emergency department this morning with abdominal pain, CT and ultrasound in filed. Dr. Horton performed a small drainage from the abdominal wound, moderate amount of creamy, serosanguineous fluid from abdominal wound present. Iodoform gauze packed into her wound, dressing changed as ordered. Wound cultures collected and sent  to lab/micro, pending results. Rx for Norco 5/325 mg one tab PO every 4 hours PRN as needed # 24 given to pt by Dr. Horton.      8/21/2019: Clinic visit with Dr. Horton. Wound dressing changed as ordered.      11/6/19: Follow up appt with Dr Horton. Wounds improving slowly. Continues to take antibiotics (Augmentin 875-125mg). No complaints voiced. Follow up in 1 week.  11/13/19:  F/U clinic visit with Dr. Horton.  Wound depth per Dr. Horton is 8.5 cm.  Continue PT Augmentin.  Patient is co left sided abdominal pain.  Abdominal US and labs ordered.  Wound care tolerated well.  Fu with Dr. Horton in 1 week.     11/20/19:Dr Horton assessed patient. Silver nitrate applied to wound. Continuing present plan of care. Patient is scheduled to have abdominal ultra sound Friday 11/22/19. Continues to take Augmentin.  F/U in 1 week.    11/27/19: Follow up in clinic with Dr. Horton. Wound depth 8.5cm, silver nitrate x 3 per Dr. Horton, patient continues taking po Augmentin, c/o left sided abdominal pain, ultrasound results discussed with patient, Dr. Horton will watch for now.  Follow up 1 week 12/4/19.     12/04/19: F/u Dr. Horton. Continue with current wound care treatment. Rx given for Bentyl 10 mg. Patient to follow up in  1 week at wound clinic  12/11/19: F/u with . Continue with current wound care treatment. Rx given for Lotrisone cream to be applied daily per patient to right lower leg rash. Patient to follow up in  1 week at wound clinic    12/18/19: F/u with Dr. Horton. Patient c/o pain 9 out of 10  to abdomen LLQ. Area of pain is warm to touch, pink, and a small nodule can be felt. Patient states that symptoms began about 4 days ago. Stat CT scan ordered. Patient will have CT done tomorrow due to not fasting this morning. Instructed patient to fast before CT scan. Continue with current wound care treatment     01/08/2020: F/u with Dr. Horton. Patient had I&D on 12/22/19 for abscess to abdomen.  New wound care orders given. Patient continues on po abx and home health for wound care. Follow up in 1 week at wound care clinic    01/15/20: F/u with Dr. Horton. Patient c/o mild discomfort at wound to left abdomen. Patient denies any fever, chills, n&v, diarrhea, and/or constipation. Will continue to monitor. Continue with current wound care treatment and follow up in  Wound clinic in 1 week.  1/29/2020: Fu today in wound care clinic with .  Wounds improving slowly.  Silver nitrate x 1 to each wound per Dr. Horton patient tolerated well.  RX give to patient today for Amoxicillin po, Bentyl Po and Norco 5/325mg Po today in clinic.  Fu 1 week 2/5/2020 with Dr. Horton.  2/5/20: F/U with Dr. Horton. LUQ site resolved. Midline site same. Cont. POC.  2/12/20: F/U with Dr. Horton. LUQ site and midline probed by Dr. Horton. Cont. Augmentin. Rx for Norco provided. Cont. POC.  2/19/20: F/U with Dr. Horton. Afebrile. No s/s of infection. Culture done of midline today. Rx for Augmentin and Zofran sent electronically to pharmacy. Return in 1 week.    Review of Systems   Constitutional: Negative.    HENT: Negative.    Eyes: Negative.    Respiratory: Negative.    Cardiovascular: Negative.    Gastrointestinal: Negative.    Genitourinary: Negative.    Musculoskeletal: Negative.    Skin: Negative.    Neurological: Negative.    Psychiatric/Behavioral: Negative.        Objective:      Physical Exam   Constitutional: She is oriented to person, place, and time. She appears well-developed and well-nourished.   HENT:   Head: Normocephalic.   Eyes: Pupils are equal, round, and reactive to light. Conjunctivae and EOM are normal.   Neck: Normal range of motion. Neck supple.   Cardiovascular: Normal rate, regular rhythm, normal heart sounds and intact distal pulses.   Pulmonary/Chest: Effort normal and breath sounds normal.   Abdominal: Soft. Bowel sounds are normal.   Musculoskeletal: Normal range of motion.    Neurological: She is alert and oriented to person, place, and time. She has normal reflexes.   Skin: Skin is warm and dry.       Assessment:       1. Abscess of abdominal wall           Wound 02/01/20 1353 Ulceration lower Abdomen (Active)   02/01/20 1353    Pre-existing: Yes   Primary Wound Type: Ulceration   Side:    Orientation: lower   Location: Abdomen   Wound/PI Number (optional):    Ankle-Brachial Index:    Pulses:    Removal Indication and Assessment:    Wound Outcome:    (Retired) Wound Type:    (Retired) Wound Length (cm):    (Retired) Wound Width (cm):    (Retired) Depth (cm):    Wound Description (Comments):    Removal Indications:    Dressing Appearance Moist drainage 2/19/2020  9:40 AM   Drainage Amount Small 2/19/2020  9:40 AM   Drainage Characteristics/Odor Serosanguineous 2/19/2020  9:40 AM   Appearance Red;Moist 2/19/2020  9:40 AM   Tissue loss description Full thickness 2/19/2020  9:40 AM   Periwound Area Dry;Intact 2/19/2020  9:40 AM   Wound Edges Open 2/19/2020  9:40 AM   Wound Length (cm) 0.3 cm 2/19/2020  9:40 AM   Wound Width (cm) 0.4 cm 2/19/2020  9:40 AM   Wound Depth (cm) 2 cm 2/19/2020  9:40 AM   Wound Volume (cm^3) 0.24 cm^3 2/19/2020  9:40 AM   Wound Surface Area (cm^2) 0.12 cm^2 2/19/2020  9:40 AM   Care Cleansed with:;Sterile normal saline 2/19/2020  9:40 AM   Dressing Changed;Gauze;Abd pad;Other (see comments) 2/19/2020  9:40 AM   Periwound Care Absorptive dressing applied 2/19/2020  9:40 AM   Dressing Change Due 02/21/20 2/19/2020  9:40 AM         Abdominal wound midline and LUQ site:  Clean wound with normal saline  Lidocaine 2% gel or 4 % Topical solution: PRN  Chauncey wound:  Maintain dry chauncey wound, Cavilon under tape, betamethasone PRN itching  Primary dressing: Apply gentamycin to wound bed   Secondary dressing: Island border dressing, or  cover with 4x4 gauze, small ABD pad, secure with mefix tape if Island border does not stick.  Frequency: Mon, Wed, Fri        Follow up with  Dr. Horton in 1 week on Wednesday 2/26/2020  Home Health:  [facility]:  Home Health nurse to assess wound and perform wound care on Mon, Fri, and PRN. Patient requesting that home health nurse Genna come out to do her wound care                Plan:                2/26/2020

## 2020-02-22 LAB
BACTERIA SPEC AEROBE CULT: ABNORMAL
BACTERIA SPEC AEROBE CULT: ABNORMAL

## 2020-02-23 LAB
ACID FAST MOD KINY STN SPEC: NORMAL
MYCOBACTERIUM SPEC QL CULT: NORMAL

## 2020-02-24 LAB
BACTERIA SPEC ANAEROBE CULT: ABNORMAL
BACTERIA SPEC ANAEROBE CULT: ABNORMAL

## 2020-02-26 ENCOUNTER — HOSPITAL ENCOUNTER (OUTPATIENT)
Dept: WOUND CARE | Facility: HOSPITAL | Age: 70
Discharge: HOME | End: 2020-02-26
Attending: SURGERY
Payer: MEDICARE

## 2020-02-26 VITALS
HEIGHT: 65 IN | TEMPERATURE: 98 F | SYSTOLIC BLOOD PRESSURE: 162 MMHG | BODY MASS INDEX: 27.32 KG/M2 | WEIGHT: 164 LBS | DIASTOLIC BLOOD PRESSURE: 78 MMHG | HEART RATE: 77 BPM

## 2020-02-26 DIAGNOSIS — E11.620 TYPE 2 DIABETES MELLITUS WITH DIABETIC DERMATITIS, WITHOUT LONG-TERM CURRENT USE OF INSULIN: ICD-10-CM

## 2020-02-26 DIAGNOSIS — I10 ESSENTIAL HYPERTENSION: ICD-10-CM

## 2020-02-26 DIAGNOSIS — K63.2 FISTULA OF INTESTINE, EXCLUDING RECTUM AND ANUS: ICD-10-CM

## 2020-02-26 DIAGNOSIS — T14.8XXA DRAINAGE FROM WOUND: ICD-10-CM

## 2020-02-26 DIAGNOSIS — T81.43XA POSTPROCEDURAL INTRAABDOMINAL ABSCESS: Primary | ICD-10-CM

## 2020-02-26 DIAGNOSIS — Z98.890 S/P EXPLORATORY LAPAROTOMY: ICD-10-CM

## 2020-02-26 DIAGNOSIS — R10.32 LLQ PAIN: ICD-10-CM

## 2020-02-26 DIAGNOSIS — L08.9 LOCAL INFECTION OF SKIN AND SUBCUTANEOUS TISSUE: ICD-10-CM

## 2020-02-26 PROCEDURE — 99213 OFFICE O/P EST LOW 20 MIN: CPT

## 2020-02-26 RX ORDER — TETRACYCLINE HYDROCHLORIDE 500 MG/1
500 CAPSULE ORAL 4 TIMES DAILY
Qty: 60 CAPSULE | Refills: 3 | Status: SHIPPED | OUTPATIENT
Start: 2020-02-26 | End: 2020-05-28 | Stop reason: SDUPTHER

## 2020-02-26 NOTE — PROGRESS NOTES
"Subjective:       Patient ID: Azucena Morrison is a 69 y.o. female.    Chief Complaint: Wound Care    6/29/17: Pt re-admit for distal abdominal wound. Pt denies any fevers or chills but states she feels nauseous at times. Returned to [place] June 28, from [place], reports much trouble with abdominal wound while in [place].  7/6/17-- Patient scheduled for surgical drainage of wound Tuesday 7/11/17DB  7/19/17-- Patient post op clinic visit.  8/16/17: CT of abdomen and pelvis done 8/9/17 due to suspected fistula  8/23/17-- U/S of abdomen done today.  12/13/17 Wound vac with 20cc drainage in clinic today.  1/10/18: on PO abx  1/24/18: Fistulagram ordered per Dr. Horton. Patient still on PO abx  02/21/18 Patient is not on antibiotics at this time. BS fasting 135 per patient report this am.  03/07/18 Culture of Anterior Abdominal Wound is positive. No antibiotics at this time.  fasting per patient report.    03/21/18 Patient c/o nausea along with abdominal tenderness near abd midline wound. Patient states that pain level is 6 and that she passed two sero-sanguineous clots from wound. Patient is afebrile this am.  3/28/18: patient continue antibiotics and reports that pain is less than last weeks clinic visit. Drainage has decreased as well  04/04/18 Patient states that abdominal pain is "pressure" sensation and that when she pushes down on her abdomen on either side of abdominal wound she feels like she has to urinate. Patient reports pain level of 3 this am.  fasting this am per patient report.   5/2/18: Patient had Abdomina Toribio today before wound care. She had discontinued Bactrim PO per Dr. Horton's recommendation and culture results and is now taking Amoxicillin PO  6/20/18: Pt reports itching to wound and periwound   6/27/18: patient reports no new complaints with regards to her wound or abdomen, wound continues to decrease in size and drainage  8/1/18: Pt reports increased pain to abdomen with nausea and " emesis since thursday 7/26/18, denies any fevers, patient did no go to ER as instructed by home health nurse on Sat. 7/28/18.  8/29/18: Patient admitted to hospital 8/2 for abdominal wound complications. Surgery for abdominal abscess per Dr. Horton on 8/3. Patient placed on IV antibiotics and discharged home on 8/18 with [facility]  and PICC line to LUE. Patient currently on IV ertapenem. IV medication sent to patients home per [facility].   9/5/18: IV Ertapenem to continue 1 week. Culture sent to lab. [facility]  sent orders. PICC line intact to E.  9/12/18 Antibiotics completed. Culture results pending. 1 deep stitch remaining.  9/12/18: Culture positive for E. Coli, patient to continue Ertapenem IV antibiotics x 2weeks. [facility] notified  9/26/18: F/U abdominal wound, wound continues to improve. Patient will complete IV antibiotics today  10/3/18: patient c/o diarrhea for 2 days and nausea with some vomiting. Labs and stool culture ordered. IV antibiotics discontinued today  10/10/2018 patient will start on IV antibiotic Invaz IV once a day, pending PICC line placement, order placed today  for PICC per Dr. Horton.  10/24/18: patient on IV antibiotics, tolerating well. Continue for 1 week  11/7/2018: IV Antibiotic discontinued.  11/8/2018: PICC line to left upper arm discontinued by Home Health.  11/21/2018 F/u for abdominal wall fistula , c/o nausea  No vomiting , no fever  12/5/2018: F/u for abdominal wall fisula, c/o gas, Dr. Horton will order Bentyl. Surgery schedule for January 2019.  12/12/2018: F/u for abdominal wall fistula, c/o abdominal pain. Dr. Horton ordered Norco 5/325mg tab 1 PO every 4 hours as needed for pain and referred patient for x-ray of abdomen after clinic today.  12/19/2018: F/u for abdominal wall fisula, c/o nausea, Dr. Horton re ordered Ondansetron (Zofran) 8mg one tab by mouth every eight hours as needed for nausea. No changes in wound condition from last visit  noted in clinic today.  12/26/2018: F/u abdominal wall fistula drainage, no c/o at this time. Denies any abdominal pain or nausea. Dr. Horton recommends surgery first week of January 2019 for abdominal fistula treatment and possible colostomy placement, patient agree.  01/02/2019: F/u abdominal wall fistula. Dressing with large amount of drainage, malodorous, Dr. Horton is scheduling surgery for third week of January, 2019.  1/16/19: F/U Dr. Horton today in clinic, surgery scheduled for next Friday 1/25/19 with Dr. Horton.     01/23/2019: Presents to wound care clinic for f/u abdominal wound care tx. Surgery scheduled with Dr. Horton for Friday 01/25/2019. Dr. Horton explained Mrs. Morrison surgery procedure including possible complications, Nurse  (Monegasque) present, patient verbalizes understanding of procedure including possible complications, all questions from patient were answered by Dr. Horton including blood sugar and blood pressure medications per patient's concerns. Consent for surgery and blood transfusion signed by patient, Dr. Horton and nurse witnessed.  Abdominal wound cultures collected per Dr. Horton, cultures sent to lab/micro pending results. Dr. Horton prescribed the following orders: fleet enema for Thursday 1/24/2019, clear liquid diet and not to eat after midnight all for 1/24/2019. Start taking Neomycin and erythromycin by mouth three times a day (1/24/2019) and dulcolax one tab by mouth twice a day, Mrs. Morrison voices understanding.     01/30/2019: F/U wound care treatment with Dr. Horton. Per pt, primary physician Dr. Pj Monea ordered for her to take potassium pills for three days, last day today and scheduled for lab work at primary physician clinic on 1/31/2019. Per pt. Feeling better, no more abdominal pain (went to ER 1/24/2019 and discharged 1/25/2019 c/o abd pain.). Dr. Horton awaiting on primary clearance to re-schedule surgery, per pt. She will call wound  "care clinic and Dr. Horton to make aware of clearance day. Continue with same orders for dressing change for Dr. Horton. Mrs. Morrison requesting gentamicin refill.     02/06/2019: F/u with Dr. Horton for wound care treatment. Mrs. Morrison brought to clinic a clearance for surgery by Dr. Pj Sanches dated 02/06/2019 "Hyperkalemia-Resolved K 4.8 2/1/2019, labs , Cr. 1.05, H/H 10/30. No contraindication to surgery, tight control of BS, Hydration." A copy of EKG (1/24/2019) and lab work from [facility] collected 1/31/2019, reported results 2/1/2019. Dr. Horton scheduled surgery for 02/22/2019, consent were signed on 01/23/2019, all questions were answered by Dr. Horton, this nurse  (Turkmen) present. Education provided to patient on the importance of having a clear liquid diet the day before surgery 02/21/2019 as per Dr. Horton, new medications and preop preparation before surgery, verbalizes understanding. Dr. Horton ordered Norco 5/325 mg PO for pain, Dulcolax two tabs by mouth to take on 02/21/2019, Soap suds enema (SSE) on Thursday 02/21/2019, Golytely by mouth 2 liters on 2/21/2019, Erythromycin 500 mg one tab by mouth three times a day and Neomycin 1 gm by mouth three times a day.     2/13/2019: Presents to wound care clinic for a f/u with Dr. Horton for wound care treatment. No new orders in wound dressing change, reviewed preop orders with Mrs. Morrison per Dr. Horton, all questions answered. Surgery scheduled for 02/22/2019.  2/20/19: Continues wound care a previously ordered.  Depth measured per Dr. Horton 8cm.  Preop orders reviewed with patient who verbalized understanding.  Surgery scheduled for Fri 2/22/19.  Rx given to patient for Norco and Zofran.    03/06/19: Patient admitted to [facility] on 02/22/19 for exploratory laparotomy of abdomen per Dr. Horton. Patient discharged on 02/28/19 with home health and PICC line with IV antibiotics. Staples removed today in " "clinic. Patient denies any fever, chills, n&v; however, she states that she has "low energy." Continued with wound care as ordered.     3/13/2019: F/U with Dr. Horton for wound care treatment. Continue with same wound dressing change orders as per Dr. Horton, orders followed. Home Health to continue wound dressing change and lab draw for CBC weekly; continue IV Ertapenem/Invaz 1 gm IV daily as ordered.     3/20/2019: Clinic visit with Dr. Horton for abdominal abscess treatment. Presents with moderate draining from abdominal wound; wound size decreasing per measurement. Per Dr. Horton, apply one-piece system ostomy bag to abdominal wound for drain collection, may drain bag when it fills and may change every other day, continue applying gentamicin to wound bed before applying ostomy bag. Continue IV Invaz until completion. If ostomy bag not effective for draining collection, may return to previous orders for wound dressing change. Orders followed. Education provided to Mrs. Morrison on handwashing and ostomy care techniques, voices and demonstrates understanding.   3/27/19: Follow up with Dr. Horton today in clinic for abdominal abscess, moderated tanish yellow drainge present on dressing.  Wound slowly improving, patient refused one-piece ostomy bag stated " no it's too messy." requested to return to previous dressing prior to ostomy bag placement- iodoform gauze, aquacel extra, sm abd, and mefix tape.  Silver nitrate x1 per Dr. Horton today in clinic. Rx given to patient for PO Zofran.     04/03/2019: F/u clinic visit with Dr. Horton. Abdominal wound improving in size, picture on file. Wound cultures from abdominal abscess collected and sent to lab/micro, pending results. Mrs. Morrison states going out of the country ([place] at the end of April and plans to stay there for approximately two months. Dr. Horton ordered IV antibiotic for two more weeks and new wound care dressing, orders " followed.     4/9/2019: Clinic visit with Dr. Horton.  Per Dr. Horton, wound deep measurement increased, draining present. Wound cultures from abdominal wound taken and sent to lab/micro, pending results. C/o abdominal pain and itching around wound, Rx for betamethasone cream 01.% and Norco 5/325mg give in clinic by Dr. Horton. Wound care dressing orders followed. Continue IV antibiotic as previously ordered.  04/17/19: F/u for non-healing wound to abdomen. Culture report negative. Dr. Horton ordered 1 more week of IV antibiotics. Continue with topical wound care as ordered.     4/24/2019: Clinic visit with Dr. Horton for abdominal wound treatment. Wound improvement present, pictures on file. Last IV antibiotic today 4/24/2019, [facility] to discontinue PICC line from left upper arm on 4/25/2019. Per Lyubov Prince, going to [place] on Friday 4/26/2019 and out of the country ([place]) on Monday 4/29/2019. This nurse spoke to Shaunna at [employer] (IV antibiotic delivery company) and informed last dose of antibiotic is today. Dr. Horton ordered Hydrocodone-acetaminophen (Norco) 5-325 mg (32 tabs), Bentyl 10 mg (120 capsules) and Gentamycin ointment. Education provided on the importance of eating food that contains iron (to prevent anemia) as well as eating meat and taking her blood pressure medications (blood pressure medication) on time, voices understanding. All questions answered by Dr. Horton. [facility] outpatient confirmed Betamethasone cream ordered on 4/10/2019 is ready for . Instructions and education provided to Mrs. Morrison on abdominal wound dressing changes, hand washing techniques and medication use, effects and side effects, voices and demonstrates understanding. Discharged home on stable conditions, [facility] will give wound care clinic a call when she is back from [place] in few months from now.     7/17/19: Readmit today to wound care clinic.  Patient was recently out of the  country visiting family in [place].  Patient complains of pain and nausea as on prior visits.  Dr. Horton seen patient today discussed with patient again placing colostomy patient refused.  Culture of wound taken, Dr. Horton restarted patient on zofran po for nausea, and Norco for pain. [facility] restarted today in clinic on Mondays and Fridays and prn. Orders given to gently pack wound with aquacel ag rope, cover with aquacel extra, 4x4 gauze, small abd pad and mefix tape change dressing ever other day by [facility] and Prn per patient.     7/24/2019: Clinic visit with Dr. Horton. Continue with dressing change as ordered. Wound cultures reviewed with patient, lab work ordered by Dr. Horton, no fever present or reported at this time. C/o decrease appetite, medication periactin prescribed. Requesting needles for insulin pen, order prescribed by Dr. Horton.      7/31/2019: F/U clinic visit with Dr. Horton. C/o of excruciated back pain 10/10, not feeling well, lot of gas and left upper quadrant discomfort. New orders prescribed: Augmentin 875-125 mg by mouth twice a day. Bentyl 10 mg one tab by mouth 4 times a day. Tramadol 50 mg one tab by mouth every 6 hours as needed for pain. Abdomen ultrasound. Wound care dressing completed as ordered.     8/7/2019: Clinic visit with Dr. Horton, denies any pain at this time. Wound care dressing done as ordered, no changes in wound size from last visit noted. Continue taking oral antiiotic as ordered, pending abdominal ultrasound, scheduled for 8/13/2019.     8/14/2019: F/U clinic visit with Dr. Horton. Admitted and discharged from emergency department this morning with abdominal pain, CT and ultrasound in filed. Dr. Horton performed a small drainage from the abdominal wound, moderate amount of creamy, serosanguineous fluid from abdominal wound present. Iodoform gauze packed into her wound, dressing changed as ordered. Wound cultures collected and sent to  lab/micro, pending results. Rx for Norco 5/325 mg one tab PO every 4 hours PRN as needed # 24 given to pt by Dr. Horton.      8/21/2019: Clinic visit with Dr. Horton. Wound dressing changed as ordered.      11/6/19: Follow up appt with Dr Horton. Wounds improving slowly. Continues to take antibiotics (Augmentin 875-125mg). No complaints voiced. Follow up in 1 week.  11/13/19:  F/U clinic visit with Dr. Horton.  Wound depth per Dr. Horton is 8.5 cm.  Continue PT Augmentin.  Patient is co left sided abdominal pain.  Abdominal US and labs ordered.  Wound care tolerated well.  Fu with Dr. Horton in 1 week.     11/20/19:Dr Horton assessed patient. Silver nitrate applied to wound. Continuing present plan of care. Patient is scheduled to have abdominal ultra sound Friday 11/22/19. Continues to take Augmentin.  F/U in 1 week.    11/27/19: Follow up in clinic with Dr. Horton. Wound depth 8.5cm, silver nitrate x 3 per Dr. Horton, patient continues taking po Augmentin, c/o left sided abdominal pain, ultrasound results discussed with patient, Dr. Horton will watch for now.  Follow up 1 week 12/4/19.     12/04/19: F/u Dr. Horton. Continue with current wound care treatment. Rx given for Bentyl 10 mg. Patient to follow up in  1 week at wound clinic  12/11/19: F/u with . Continue with current wound care treatment. Rx given for Lotrisone cream to be applied daily per patient to right lower leg rash. Patient to follow up in  1 week at wound clinic    12/18/19: F/u with Dr. Horton. Patient c/o pain 9 out of 10  to abdomen LLQ. Area of pain is warm to touch, pink, and a small nodule can be felt. Patient states that symptoms began about 4 days ago. Stat CT scan ordered. Patient will have CT done tomorrow due to not fasting this morning. Instructed patient to fast before CT scan. Continue with current wound care treatment     01/08/2020: F/u with Dr. Horton. Patient had I&D on 12/22/19 for abscess to abdomen. New  wound care orders given. Patient continues on po abx and home health for wound care. Follow up in 1 week at wound care clinic    01/15/20: F/u with Dr. Horton. Patient c/o mild discomfort at wound to left abdomen. Patient denies any fever, chills, n&v, diarrhea, and/or constipation. Will continue to monitor. Continue with current wound care treatment and follow up in  Wound clinic in 1 week.  1/29/2020: Fu today in wound care clinic with .  Wounds improving slowly.  Silver nitrate x 1 to each wound per Dr. Horton patient tolerated well.  RX give to patient today for Amoxicillin po, Bentyl Po and Norco 5/325mg Po today in clinic.  Fu 1 week 2/5/2020 with Dr. Horton.  2/5/20: F/U with Dr. Horton. LUQ site resolved. Midline site same. Cont. POC.  2/12/20: F/U with Dr. Horton. LUQ site and midline probed by Dr. Horton. Cont. Augmentin. Rx for Norco provided. Cont. POC.  2/19/20: F/U with Dr. Horton. Afebrile. No s/s of infection. Culture done of midline today. Rx for Augmentin and Zofran sent electronically to pharmacy. Return in 1 week.  2/26/2020: FU MD visit. Cultures reviewed per MD, po abx changed from augmentin to tetracycline sent to pharmacy, patient verbalized understanding. Patient states she will be traveling to [place] on March 29 for 3 months.     Review of Systems   Constitutional: Negative.    HENT: Negative.    Eyes: Negative.    Respiratory: Negative.    Cardiovascular: Negative.    Gastrointestinal: Negative.    Genitourinary: Negative.    Musculoskeletal: Negative.    Skin: Negative.    Neurological: Negative.    Psychiatric/Behavioral: Negative.        Objective:      Physical Exam   Constitutional: She is oriented to person, place, and time. She appears well-developed and well-nourished.   HENT:   Head: Normocephalic.   Eyes: Pupils are equal, round, and reactive to light. Conjunctivae and EOM are normal.   Neck: Normal range of motion. Neck supple.   Cardiovascular: Normal rate,  regular rhythm, normal heart sounds and intact distal pulses.   Pulmonary/Chest: Effort normal and breath sounds normal.   Abdominal: Soft. Bowel sounds are normal.   Musculoskeletal: Normal range of motion.   Neurological: She is alert and oriented to person, place, and time. She has normal reflexes.   Skin: Skin is warm and dry.       Assessment:       1. Postprocedural intraabdominal abscess    2. LLQ pain    3. Essential hypertension    4. Fistula of intestine, excluding rectum and anus    5. Drainage from wound           Wound 02/01/20 1353 Ulceration lower Abdomen (Active)   02/01/20 1353    Pre-existing: Yes   Primary Wound Type: Ulceration   Side:    Orientation: lower   Location: Abdomen   Wound/PI Number (optional):    Ankle-Brachial Index:    Pulses:    Removal Indication and Assessment:    Wound Outcome:    (Retired) Wound Type:    (Retired) Wound Length (cm):    (Retired) Wound Width (cm):    (Retired) Depth (cm):    Wound Description (Comments):    Removal Indications:    Dressing Appearance Moist drainage 2/26/2020  8:00 AM   Drainage Amount Small 2/26/2020  8:00 AM   Drainage Characteristics/Odor Serosanguineous 2/26/2020  8:00 AM   Appearance Red;Moist 2/26/2020  8:00 AM   Tissue loss description Full thickness 2/26/2020  8:00 AM   Red (%), Wound Tissue Color 100 % 2/26/2020  8:00 AM   Periwound Area Intact;Dry 2/26/2020  8:00 AM   Wound Edges Open 2/26/2020  8:00 AM   Wound Length (cm) 0.2 cm 2/26/2020  8:00 AM   Wound Width (cm) 0.2 cm 2/26/2020  8:00 AM   Wound Depth (cm) 2.6 cm 2/26/2020  8:00 AM   Wound Volume (cm^3) 0.1 cm^3 2/26/2020  8:00 AM   Wound Surface Area (cm^2) 0.04 cm^2 2/26/2020  8:00 AM   Care Cleansed with:;Sterile normal saline 2/26/2020  8:00 AM   Dressing Applied;Gauze;Abd pad 2/26/2020  8:00 AM   Periwound Care Absorptive dressing applied;Dry periwound area maintained;Skin barrier film applied 2/26/2020  8:00 AM   Dressing Change Due 02/28/20 2/26/2020  8:00 AM        Abdominal wound midline and LUQ abdomen :  Clean wound with normal saline  Lidocaine 2% gel or 4 % Topical solution: PRN  Chauncey wound:  Maintain dry chauncey wound, Cavilon under tape, betamethasone PRN itching  Primary dressing: Apply gentamycin to wound bed   Secondary dressing: 4 x 4 gauze, small ABD, mefix tape  Frequency: Monday, Wednesday, Friday  Other: abx changed to Tetracycline BID    Follow up with Dr. Horton in 1 week on Wednesday 3/4/2020  Home Health:  [facility]:  Home Health nurse to assess wound and perform wound care on Mon, Fri, and PRN. Patient requesting that home health nurse Genna come out to do her wound care    Plan:              Follow up in about 1 week (around 3/4/2020).

## 2020-03-04 ENCOUNTER — HOSPITAL ENCOUNTER (OUTPATIENT)
Dept: WOUND CARE | Facility: HOSPITAL | Age: 70
Discharge: HOME | End: 2020-03-04
Attending: SURGERY
Payer: MEDICARE

## 2020-03-04 VITALS
WEIGHT: 157 LBS | TEMPERATURE: 98 F | SYSTOLIC BLOOD PRESSURE: 150 MMHG | HEART RATE: 76 BPM | BODY MASS INDEX: 26.16 KG/M2 | DIASTOLIC BLOOD PRESSURE: 79 MMHG | HEIGHT: 65 IN

## 2020-03-04 DIAGNOSIS — E11.620 TYPE 2 DIABETES MELLITUS WITH DIABETIC DERMATITIS, WITHOUT LONG-TERM CURRENT USE OF INSULIN: ICD-10-CM

## 2020-03-04 DIAGNOSIS — K63.2 COLONIC FISTULA: ICD-10-CM

## 2020-03-04 DIAGNOSIS — I10 ESSENTIAL HYPERTENSION: ICD-10-CM

## 2020-03-04 DIAGNOSIS — L02.211 ABSCESS OF ABDOMINAL WALL: ICD-10-CM

## 2020-03-04 DIAGNOSIS — R10.84 GENERALIZED ABDOMINAL PAIN: ICD-10-CM

## 2020-03-04 DIAGNOSIS — R10.32 LLQ PAIN: Primary | ICD-10-CM

## 2020-03-04 DIAGNOSIS — Z90.49 HISTORY OF HEMICOLECTOMY: ICD-10-CM

## 2020-03-04 PROCEDURE — 99214 OFFICE O/P EST MOD 30 MIN: CPT

## 2020-03-04 PROCEDURE — 87076 CULTURE ANAEROBE IDENT EACH: CPT | Mod: 59

## 2020-03-04 PROCEDURE — 87075 CULTR BACTERIA EXCEPT BLOOD: CPT

## 2020-03-04 PROCEDURE — 87186 SC STD MICRODIL/AGAR DIL: CPT

## 2020-03-04 PROCEDURE — 87077 CULTURE AEROBIC IDENTIFY: CPT

## 2020-03-04 PROCEDURE — 87070 CULTURE OTHR SPECIMN AEROBIC: CPT

## 2020-03-04 NOTE — PROGRESS NOTES
"Subjective:       Patient ID: Azucena Morrison is a 69 y.o. female.    Chief Complaint: Non-healing Wound    6/29/17: Pt re-admit for distal abdominal wound. Pt denies any fevers or chills but states she feels nauseous at times. Returned to [place] June 28, from [place], reports much trouble with abdominal wound while in [place].  7/6/17-- Patient scheduled for surgical drainage of wound Tuesday 7/11/17DB  7/19/17-- Patient post op clinic visit.  8/16/17: CT of abdomen and pelvis done 8/9/17 due to suspected fistula  8/23/17-- U/S of abdomen done today.  12/13/17 Wound vac with 20cc drainage in clinic today.  1/10/18: on PO abx  1/24/18: Fistulagram ordered per Dr. Horton. Patient still on PO abx  02/21/18 Patient is not on antibiotics at this time. BS fasting 135 per patient report this am.  03/07/18 Culture of Anterior Abdominal Wound is positive. No antibiotics at this time.  fasting per patient report.    03/21/18 Patient c/o nausea along with abdominal tenderness near abd midline wound. Patient states that pain level is 6 and that she passed two sero-sanguineous clots from wound. Patient is afebrile this am.  3/28/18: patient continue antibiotics and reports that pain is less than last weeks clinic visit. Drainage has decreased as well  04/04/18 Patient states that abdominal pain is "pressure" sensation and that when she pushes down on her abdomen on either side of abdominal wound she feels like she has to urinate. Patient reports pain level of 3 this am.  fasting this am per patient report.   5/2/18: Patient had Abdomina Toribio today before wound care. She had discontinued Bactrim PO per Dr. Horton's recommendation and culture results and is now taking Amoxicillin PO  6/20/18: Pt reports itching to wound and periwound   6/27/18: patient reports no new complaints with regards to her wound or abdomen, wound continues to decrease in size and drainage  8/1/18: Pt reports increased pain to abdomen with " nausea and emesis since thursday 7/26/18, denies any fevers, patient did no go to ER as instructed by home health nurse on Sat. 7/28/18.  8/29/18: Patient admitted to hospital 8/2 for abdominal wound complications. Surgery for abdominal abscess per Dr. Horton on 8/3. Patient placed on IV antibiotics and discharged home on 8/18 with [facility]  and PICC line to LUE. Patient currently on IV ertapenem. IV medication sent to patients home per [facility].   9/5/18: IV Ertapenem to continue 1 week. Culture sent to lab. [facility]  sent orders. PICC line intact to E.  9/12/18 Antibiotics completed. Culture results pending. 1 deep stitch remaining.  9/12/18: Culture positive for E. Coli, patient to continue Ertapenem IV antibiotics x 2weeks. [facility] notified  9/26/18: F/U abdominal wound, wound continues to improve. Patient will complete IV antibiotics today  10/3/18: patient c/o diarrhea for 2 days and nausea with some vomiting. Labs and stool culture ordered. IV antibiotics discontinued today  10/10/2018 patient will start on IV antibiotic Invaz IV once a day, pending PICC line placement, order placed today  for PICC per Dr. Horton.  10/24/18: patient on IV antibiotics, tolerating well. Continue for 1 week  11/7/2018: IV Antibiotic discontinued.  11/8/2018: PICC line to left upper arm discontinued by Home Health.  11/21/2018 F/u for abdominal wall fistula , c/o nausea  No vomiting , no fever  12/5/2018: F/u for abdominal wall fisula, c/o gas, Dr. Horton will order Bentyl. Surgery schedule for January 2019.  12/12/2018: F/u for abdominal wall fistula, c/o abdominal pain. Dr. Horton ordered Norco 5/325mg tab 1 PO every 4 hours as needed for pain and referred patient for x-ray of abdomen after clinic today.  12/19/2018: F/u for abdominal wall fisula, c/o nausea, Dr. Horton re ordered Ondansetron (Zofran) 8mg one tab by mouth every eight hours as needed for nausea. No changes in wound condition from  last visit noted in clinic today.  12/26/2018: F/u abdominal wall fistula drainage, no c/o at this time. Denies any abdominal pain or nausea. Dr. Horton recommends surgery first week of January 2019 for abdominal fistula treatment and possible colostomy placement, patient agree.  01/02/2019: F/u abdominal wall fistula. Dressing with large amount of drainage, malodorous, Dr. Horton is scheduling surgery for third week of January, 2019.  1/16/19: F/U Dr. Horton today in clinic, surgery scheduled for next Friday 1/25/19 with Dr. Horton.     01/23/2019: Presents to wound care clinic for f/u abdominal wound care tx. Surgery scheduled with Dr. Horton for Friday 01/25/2019. Dr. Horton explained Mrs. Morrison surgery procedure including possible complications, Nurse  (Romansh) present, patient verbalizes understanding of procedure including possible complications, all questions from patient were answered by Dr. Horton including blood sugar and blood pressure medications per patient's concerns. Consent for surgery and blood transfusion signed by patient, Dr. Horton and nurse witnessed.  Abdominal wound cultures collected per Dr. Horton, cultures sent to lab/micro pending results. Dr. Horton prescribed the following orders: fleet enema for Thursday 1/24/2019, clear liquid diet and not to eat after midnight all for 1/24/2019. Start taking Neomycin and erythromycin by mouth three times a day (1/24/2019) and dulcolax one tab by mouth twice a day, Mrs. Morrison voices understanding.     01/30/2019: F/U wound care treatment with Dr. Horton. Per pt, primary physician Dr. Pj Monae ordered for her to take potassium pills for three days, last day today and scheduled for lab work at primary physician clinic on 1/31/2019. Per pt. Feeling better, no more abdominal pain (went to ER 1/24/2019 and discharged 1/25/2019 c/o abd pain.). Dr. Horton awaiting on primary clearance to re-schedule surgery, per pt. She will  "call wound care clinic and Dr. Horton to make aware of clearance day. Continue with same orders for dressing change for Dr. Horton. Mrs. Morrison requesting gentamicin refill.     02/06/2019: F/u with Dr. Horton for wound care treatment. Mrs. Morrison brought to clinic a clearance for surgery by Dr. Pj Sanches dated 02/06/2019 "Hyperkalemia-Resolved K 4.8 2/1/2019, labs , Cr. 1.05, H/H 10/30. No contraindication to surgery, tight control of BS, Hydration." A copy of EKG (1/24/2019) and lab work from [facility] collected 1/31/2019, reported results 2/1/2019. Dr. Horton scheduled surgery for 02/22/2019, consent were signed on 01/23/2019, all questions were answered by Dr. Horton, this nurse  (Occitan) present. Education provided to patient on the importance of having a clear liquid diet the day before surgery 02/21/2019 as per Dr. Horton, new medications and preop preparation before surgery, verbalizes understanding. Dr. Horton ordered Norco 5/325 mg PO for pain, Dulcolax two tabs by mouth to take on 02/21/2019, Soap suds enema (SSE) on Thursday 02/21/2019, Golytely by mouth 2 liters on 2/21/2019, Erythromycin 500 mg one tab by mouth three times a day and Neomycin 1 gm by mouth three times a day.     2/13/2019: Presents to wound care clinic for a f/u with Dr. Horton for wound care treatment. No new orders in wound dressing change, reviewed preop orders with Mrs. Morrison per Dr. Horton, all questions answered. Surgery scheduled for 02/22/2019.  2/20/19: Continues wound care a previously ordered.  Depth measured per Dr. Horton 8cm.  Preop orders reviewed with patient who verbalized understanding.  Surgery scheduled for Fri 2/22/19.  Rx given to patient for Norco and Zofran.    03/06/19: Patient admitted to [facility] on 02/22/19 for exploratory laparotomy of abdomen per Dr. Horton. Patient discharged on 02/28/19 with home health and PICC line with IV antibiotics. Staples removed " "today in clinic. Patient denies any fever, chills, n&v; however, she states that she has "low energy." Continued with wound care as ordered.     3/13/2019: F/U with Dr. Horton for wound care treatment. Continue with same wound dressing change orders as per Dr. Horton, orders followed. Home Health to continue wound dressing change and lab draw for CBC weekly; continue IV Ertapenem/Invaz 1 gm IV daily as ordered.     3/20/2019: Clinic visit with Dr. Horton for abdominal abscess treatment. Presents with moderate draining from abdominal wound; wound size decreasing per measurement. Per Dr. Horton, apply one-piece system ostomy bag to abdominal wound for drain collection, may drain bag when it fills and may change every other day, continue applying gentamicin to wound bed before applying ostomy bag. Continue IV Invaz until completion. If ostomy bag not effective for draining collection, may return to previous orders for wound dressing change. Orders followed. Education provided to Mrs. Morrison on handwashing and ostomy care techniques, voices and demonstrates understanding.   3/27/19: Follow up with Dr. Horton today in clinic for abdominal abscess, moderated tanish yellow drainge present on dressing.  Wound slowly improving, patient refused one-piece ostomy bag stated " no it's too messy." requested to return to previous dressing prior to ostomy bag placement- iodoform gauze, aquacel extra, sm abd, and mefix tape.  Silver nitrate x1 per Dr. Horton today in clinic. Rx given to patient for PO Zofran.     04/03/2019: F/u clinic visit with Dr. Horton. Abdominal wound improving in size, picture on file. Wound cultures from abdominal abscess collected and sent to lab/micro, pending results. Mrs. Morrison states going out of the country ([place]) at the end of April and plans to stay there for approximately two months. Dr. Horton ordered IV antibiotic for two more weeks and new wound care dressing, orders " followed.     4/9/2019: Clinic visit with Dr. Horton.  Per Dr. Horton, wound deep measurement increased, draining present. Wound cultures from abdominal wound taken and sent to lab/micro, pending results. C/o abdominal pain and itching around wound, Rx for betamethasone cream 01.% and Norco 5/325mg give in clinic by Dr. Horton. Wound care dressing orders followed. Continue IV antibiotic as previously ordered.  04/17/19: F/u for non-healing wound to abdomen. Culture report negative. Dr. Horton ordered 1 more week of IV antibiotics. Continue with topical wound care as ordered.     4/24/2019: Clinic visit with Dr. Horton for abdominal wound treatment. Wound improvement present, pictures on file. Last IV antibiotic today 4/24/2019, [facility] to discontinue PICC line from left upper arm on 4/25/2019. Per Lyubov Prince, going to [place] on Friday 4/26/2019 and out of the country ([place]) on Monday 4/29/2019. This nurse spoke to Shaunna at [employer] ([employer] antibiotic delivery company) and informed last dose of antibiotic is today. Dr. Horton ordered Hydrocodone-acetaminophen (Norco) 5-325 mg (32 tabs), Bentyl 10 mg (120 capsules) and Gentamycin ointment. Education provided on the importance of eating food that contains iron (to prevent anemia) as well as eating meat and taking her blood pressure medications (blood pressure medication) on time, voices understanding. All questions answered by Dr. Horton. [facility] outpatient confirmed Betamethasone cream ordered on 4/10/2019 is ready for . Instructions and education provided to Mrs. Morrison on abdominal wound dressing changes, hand washing techniques and medication use, effects and side effects, voices and demonstrates understanding. Discharged home on stable conditions, [facility] will give wound care clinic a call when she is back from [place] in few months from now.     7/17/19: Readmit today to wound care clinic.  Patient was recently out of the  country visiting family in [place].  Patient complains of pain and nausea as on prior visits.  Dr. Horton seen patient today discussed with patient again placing colostomy patient refused.  Culture of wound taken, Dr. Horton restarted patient on zofran po for nausea, and Norco for pain. [facility] restarted today in clinic on Mondays and Fridays and prn. Orders given to gently pack wound with aquacel ag rope, cover with aquacel extra, 4x4 gauze, small abd pad and mefix tape change dressing ever other day by [facility] and Prn per patient.     7/24/2019: Clinic visit with Dr. Horton. Continue with dressing change as ordered. Wound cultures reviewed with patient, lab work ordered by Dr. Horton, no fever present or reported at this time. C/o decrease appetite, medication periactin prescribed. Requesting needles for insulin pen, order prescribed by Dr. Horton.      7/31/2019: F/U clinic visit with Dr. Horton. C/o of excruciated back pain 10/10, not feeling well, lot of gas and left upper quadrant discomfort. New orders prescribed: Augmentin 875-125 mg by mouth twice a day. Bentyl 10 mg one tab by mouth 4 times a day. Tramadol 50 mg one tab by mouth every 6 hours as needed for pain. Abdomen ultrasound. Wound care dressing completed as ordered.     8/7/2019: Clinic visit with Dr. Horton, denies any pain at this time. Wound care dressing done as ordered, no changes in wound size from last visit noted. Continue taking oral antiiotic as ordered, pending abdominal ultrasound, scheduled for 8/13/2019.     8/14/2019: F/U clinic visit with Dr. Horton. Admitted and discharged from emergency department this morning with abdominal pain, CT and ultrasound in filed. Dr. Horton performed a small drainage from the abdominal wound, moderate amount of creamy, serosanguineous fluid from abdominal wound present. Iodoform gauze packed into her wound, dressing changed as ordered. Wound cultures collected and sent to  lab/micro, pending results. Rx for Norco 5/325 mg one tab PO every 4 hours PRN as needed # 24 given to pt by Dr. Horton.      8/21/2019: Clinic visit with Dr. Horton. Wound dressing changed as ordered.      11/6/19: Follow up appt with Dr Horton. Wounds improving slowly. Continues to take antibiotics (Augmentin 875-125mg). No complaints voiced. Follow up in 1 week.  11/13/19:  F/U clinic visit with Dr. Horton.  Wound depth per Dr. Horton is 8.5 cm.  Continue PT Augmentin.  Patient is co left sided abdominal pain.  Abdominal US and labs ordered.  Wound care tolerated well.  Fu with Dr. Horton in 1 week.     11/20/19:Dr Horton assessed patient. Silver nitrate applied to wound. Continuing present plan of care. Patient is scheduled to have abdominal ultra sound Friday 11/22/19. Continues to take Augmentin.  F/U in 1 week.    11/27/19: Follow up in clinic with Dr. Horton. Wound depth 8.5cm, silver nitrate x 3 per Dr. Horton, patient continues taking po Augmentin, c/o left sided abdominal pain, ultrasound results discussed with patient, Dr. Horton will watch for now.  Follow up 1 week 12/4/19.     12/04/19: F/u Dr. Horton. Continue with current wound care treatment. Rx given for Bentyl 10 mg. Patient to follow up in  1 week at wound clinic  12/11/19: F/u with . Continue with current wound care treatment. Rx given for Lotrisone cream to be applied daily per patient to right lower leg rash. Patient to follow up in  1 week at wound clinic    12/18/19: F/u with Dr. Horton. Patient c/o pain 9 out of 10  to abdomen LLQ. Area of pain is warm to touch, pink, and a small nodule can be felt. Patient states that symptoms began about 4 days ago. Stat CT scan ordered. Patient will have CT done tomorrow due to not fasting this morning. Instructed patient to fast before CT scan. Continue with current wound care treatment     01/08/2020: F/u with Dr. Horton. Patient had I&D on 12/22/19 for abscess to abdomen. New  wound care orders given. Patient continues on po abx and home health for wound care. Follow up in 1 week at wound care clinic    01/15/20: F/u with Dr. Horton. Patient c/o mild discomfort at wound to left abdomen. Patient denies any fever, chills, n&v, diarrhea, and/or constipation. Will continue to monitor. Continue with current wound care treatment and follow up in  Wound clinic in 1 week.  1/29/2020: Fu today in wound care clinic with .  Wounds improving slowly.  Silver nitrate x 1 to each wound per Dr. Horton patient tolerated well.  RX give to patient today for Amoxicillin po, Bentyl Po and Norco 5/325mg Po today in clinic.  Fu 1 week 2/5/2020 with Dr. Horton.  2/5/20: F/U with Dr. Horton. LUQ site resolved. Midline site same. Cont. POC.  2/12/20: F/U with Dr. Horton. LUQ site and midline probed by Dr. Horton. Cont. Augmentin. Rx for Norco provided. Cont. POC.  2/19/20: F/U with Dr. Horton. Afebrile. No s/s of infection. Culture done of midline today. Rx for Augmentin and Zofran sent electronically to pharmacy. Return in 1 week.  2/26/2020: FU MD visit. Cultures reviewed per MD, po abx changed from augmentin to tetracycline sent to pharmacy, patient verbalized understanding. Patient states she will be traveling to [place] on March 29 for 3 months.     03/04/2020: F/u with Dr. Horton. Patient c/o pain at wound site located on left abdomen, lethargy, and no appetite for the past 3 days . Culture taken and patient instructed to stop po tetracycline while awaiting culture results. New wound care orders given for left abdominal wound. Orders routed to home health. Patient to follow up in 1 week at wound clinic.    Review of Systems   Constitutional: Negative.    HENT: Negative.    Eyes: Negative.    Respiratory: Negative.    Cardiovascular: Negative.    Gastrointestinal: Negative.    Genitourinary: Negative.    Musculoskeletal: Negative.    Skin: Negative.    Neurological: Negative.     Psychiatric/Behavioral: Negative.        Objective:      Physical Exam   Constitutional: She is oriented to person, place, and time. She appears well-developed and well-nourished.   HENT:   Head: Normocephalic.   Eyes: Pupils are equal, round, and reactive to light. Conjunctivae and EOM are normal.   Neck: Normal range of motion. Neck supple.   Cardiovascular: Normal rate, regular rhythm, normal heart sounds and intact distal pulses.   Pulmonary/Chest: Effort normal and breath sounds normal.   Abdominal: Soft. Bowel sounds are normal.   Musculoskeletal: Normal range of motion.   Neurological: She is alert and oriented to person, place, and time. She has normal reflexes.   Skin: Skin is warm and dry.       Assessment:       1. LLQ pain    2. Abscess of abdominal wall    3. Type 2 diabetes mellitus with diabetic dermatitis, without long-term current use of insulin           Wound 02/01/20 1353 Ulceration midline Abdomen #1 (Active)   02/01/20 1353    Pre-existing: Yes   Primary Wound Type: Ulceration   Side:    Orientation: midline   Location: Abdomen   Wound/PI Number (optional): #1   Ankle-Brachial Index:    Pulses:    Removal Indication and Assessment:    Wound Outcome:    (Retired) Wound Type:    (Retired) Wound Length (cm):    (Retired) Wound Width (cm):    (Retired) Depth (cm):    Wound Description (Comments):    Removal Indications:    Dressing Appearance Intact;Moist drainage 3/4/2020 10:00 AM   Drainage Amount Moderate 3/4/2020 10:00 AM   Drainage Characteristics/Odor Serosanguineous 3/4/2020 10:00 AM   Appearance Red;Moist 3/4/2020 10:00 AM   Tissue loss description Full thickness 3/4/2020 10:00 AM   Red (%), Wound Tissue Color 100 % 3/4/2020 10:00 AM   Periwound Area Intact 3/4/2020 10:00 AM   Wound Edges Open 3/4/2020 10:00 AM   Wound Length (cm) 0.5 cm 3/4/2020 10:00 AM   Wound Width (cm) 0.5 cm 3/4/2020 10:00 AM   Wound Depth (cm) 5.5 cm 3/4/2020 10:00 AM   Wound Volume (cm^3) 1.38 cm^3 3/4/2020 10:00  AM   Wound Surface Area (cm^2) 0.25 cm^2 3/4/2020 10:00 AM   Care Cleansed with:;Sterile normal saline 3/4/2020 10:00 AM   Dressing Applied;Other (see comments);Gauze;Abd pad 3/4/2020 10:00 AM   Periwound Care Skin barrier film applied 3/4/2020 10:00 AM   Dressing Change Due 03/06/20 3/4/2020 10:00 AM            Wound 03/04/20 1023 Abdomen #2 (Active)   03/04/20 1023    Pre-existing:    Primary Wound Type:    Side: Left   Orientation:    Location: Abdomen   Wound/PI Number (optional): #2   Ankle-Brachial Index:    Pulses:    Removal Indication and Assessment:    Wound Outcome:    (Retired) Wound Type:    (Retired) Wound Length (cm):    (Retired) Wound Width (cm):    (Retired) Depth (cm):    Wound Description (Comments):    Removal Indications:    Dressing Appearance Intact 3/4/2020 10:00 AM   Drainage Amount Moderate 3/4/2020 10:00 AM   Drainage Characteristics/Odor Yellow;Creamy 3/4/2020 10:00 AM   Appearance Pink;Red;Moist 3/4/2020 10:00 AM   Tissue loss description Full thickness 3/4/2020 10:00 AM   Red (%), Wound Tissue Color 100 % 3/4/2020 10:00 AM   Periwound Area Intact 3/4/2020 10:00 AM   Wound Edges Defined 3/4/2020 10:00 AM   Wound Length (cm) 0.6 cm 3/4/2020 10:00 AM   Wound Width (cm) 1 cm 3/4/2020 10:00 AM   Wound Depth (cm) 2 cm 3/4/2020 10:00 AM   Wound Volume (cm^3) 1.2 cm^3 3/4/2020 10:00 AM   Wound Surface Area (cm^2) 0.6 cm^2 3/4/2020 10:00 AM   Care Cleansed with:;Sterile normal saline 3/4/2020 10:00 AM   Dressing Applied;Other (see comments);Island/border;Gauze 3/4/2020 10:00 AM   Periwound Care Skin barrier film applied 3/4/2020 10:00 AM   Dressing Change Due 03/06/20 3/4/2020 10:00 AM     Abdominal wound midline   Clean wound with normal saline  Lidocaine 2% gel or 4 % Topical solution: PRN  Chauncey wound:  Maintain dry chauncey wound, Cavilon under tape, betamethasone PRN itching  Primary dressing: Apply gentamycin to wound bed   Secondary dressing: 4 x 4 gauze, small ABD, mefix tape     LUQ abdomen    Cleanse with normal saline  Lidocaine 2% gel or 4 % Topical solution: PRN  Periwound: cavilon  Primary dressing: Apply gentamicin ointment to plain packing and gently pack to wound depth  Secondary dressing: 4x4 gauze and 5x5 aquacel border    Frequency: Monday, Wednesday, Friday    Other: Culture taken of LUQ wound. Discontinue tetracycline   Rx given for 10 mg Bentyl po tid and hydrocodone 5/325 mg po every 4 hours prn pain        Plan:                Follow up with Dr. Horton in 1 week on Wednesday 3/11/2020

## 2020-03-07 LAB — BACTERIA SPEC AEROBE CULT: ABNORMAL

## 2020-03-11 ENCOUNTER — HOSPITAL ENCOUNTER (OUTPATIENT)
Dept: WOUND CARE | Facility: HOSPITAL | Age: 70
Discharge: HOME | End: 2020-03-11
Attending: SURGERY
Payer: MEDICARE

## 2020-03-11 VITALS — TEMPERATURE: 99 F | SYSTOLIC BLOOD PRESSURE: 153 MMHG | DIASTOLIC BLOOD PRESSURE: 82 MMHG | HEART RATE: 59 BPM

## 2020-03-11 DIAGNOSIS — Z98.890 S/P EXPLORATORY LAPAROTOMY: ICD-10-CM

## 2020-03-11 DIAGNOSIS — E11.9 TYPE 2 DIABETES MELLITUS WITHOUT COMPLICATION, WITHOUT LONG-TERM CURRENT USE OF INSULIN: ICD-10-CM

## 2020-03-11 DIAGNOSIS — L02.211 ABDOMINAL WALL ABSCESS: ICD-10-CM

## 2020-03-11 DIAGNOSIS — T81.43XA POSTPROCEDURAL INTRAABDOMINAL ABSCESS: ICD-10-CM

## 2020-03-11 DIAGNOSIS — R10.32 LLQ PAIN: ICD-10-CM

## 2020-03-11 DIAGNOSIS — L02.211 ABSCESS OF ABDOMINAL WALL: ICD-10-CM

## 2020-03-11 DIAGNOSIS — L08.9 LOCAL INFECTION OF SKIN AND SUBCUTANEOUS TISSUE: Primary | ICD-10-CM

## 2020-03-11 DIAGNOSIS — T81.89XD SUTURE GRANULOMA, SUBSEQUENT ENCOUNTER: ICD-10-CM

## 2020-03-11 LAB — BACTERIA SPEC ANAEROBE CULT: NORMAL

## 2020-03-11 PROCEDURE — 99213 OFFICE O/P EST LOW 20 MIN: CPT

## 2020-03-11 RX ORDER — AMOXICILLIN AND CLAVULANATE POTASSIUM 875; 125 MG/1; MG/1
TABLET, FILM COATED ORAL
Qty: 30 TABLET | Refills: 2 | Status: SHIPPED | OUTPATIENT
Start: 2020-03-11 | End: 2020-04-01 | Stop reason: SDUPTHER

## 2020-03-11 RX ORDER — GENTAMICIN SULFATE 1 MG/G
OINTMENT TOPICAL
Qty: 30 G | Refills: 3 | Status: SHIPPED | OUTPATIENT
Start: 2020-03-11 | End: 2020-05-28 | Stop reason: SDUPTHER

## 2020-03-11 RX ORDER — GENTAMICIN SULFATE 1 MG/G
OINTMENT TOPICAL
Qty: 30 G | Refills: 3 | Status: SHIPPED | OUTPATIENT
Start: 2020-03-11 | End: 2020-03-11 | Stop reason: SDUPTHER

## 2020-03-11 RX ORDER — AMOXICILLIN AND CLAVULANATE POTASSIUM 875; 125 MG/1; MG/1
TABLET, FILM COATED ORAL
Qty: 30 TABLET | Refills: 2 | Status: SHIPPED | OUTPATIENT
Start: 2020-03-11 | End: 2020-03-11 | Stop reason: SDUPTHER

## 2020-03-18 ENCOUNTER — HOSPITAL ENCOUNTER (OUTPATIENT)
Dept: WOUND CARE | Facility: HOSPITAL | Age: 70
Discharge: HOME | End: 2020-03-18
Attending: SURGERY
Payer: MEDICARE

## 2020-03-18 VITALS
TEMPERATURE: 98 F | BODY MASS INDEX: 26.16 KG/M2 | HEIGHT: 65 IN | DIASTOLIC BLOOD PRESSURE: 82 MMHG | HEART RATE: 75 BPM | WEIGHT: 157 LBS | SYSTOLIC BLOOD PRESSURE: 149 MMHG

## 2020-03-18 DIAGNOSIS — E11.9 TYPE 2 DIABETES MELLITUS WITHOUT COMPLICATION, WITHOUT LONG-TERM CURRENT USE OF INSULIN: ICD-10-CM

## 2020-03-18 DIAGNOSIS — I10 ESSENTIAL HYPERTENSION: ICD-10-CM

## 2020-03-18 DIAGNOSIS — L02.211 ABDOMINAL WALL ABSCESS: Primary | ICD-10-CM

## 2020-03-18 DIAGNOSIS — Z98.890 S/P EXPLORATORY LAPAROTOMY: ICD-10-CM

## 2020-03-18 DIAGNOSIS — K63.2 COLONIC FISTULA: ICD-10-CM

## 2020-03-18 DIAGNOSIS — R10.32 LLQ PAIN: ICD-10-CM

## 2020-03-18 PROCEDURE — 99213 OFFICE O/P EST LOW 20 MIN: CPT

## 2020-03-18 NOTE — PROGRESS NOTES
"Subjective:       Patient ID: Azucena Morrison is a 69 y.o. female.    Chief Complaint: Non-healing Wound (abdomen)    6/29/17: Pt re-admit for distal abdominal wound. Pt denies any fevers or chills but states she feels nauseous at times. Returned to [place] June 28, from [place], reports much trouble with abdominal wound while in [place].  7/6/17-- Patient scheduled for surgical drainage of wound Tuesday 7/11/17DB  7/19/17-- Patient post op clinic visit.  8/16/17: CT of abdomen and pelvis done 8/9/17 due to suspected fistula  8/23/17-- U/S of abdomen done today.  12/13/17 Wound vac with 20cc drainage in clinic today.  1/10/18: on PO abx  1/24/18: Fistulagram ordered per Dr. Horton. Patient still on PO abx  02/21/18 Patient is not on antibiotics at this time. BS fasting 135 per patient report this am.  03/07/18 Culture of Anterior Abdominal Wound is positive. No antibiotics at this time.  fasting per patient report.    03/21/18 Patient c/o nausea along with abdominal tenderness near abd midline wound. Patient states that pain level is 6 and that she passed two sero-sanguineous clots from wound. Patient is afebrile this am.  3/28/18: patient continue antibiotics and reports that pain is less than last weeks clinic visit. Drainage has decreased as well  04/04/18 Patient states that abdominal pain is "pressure" sensation and that when she pushes down on her abdomen on either side of abdominal wound she feels like she has to urinate. Patient reports pain level of 3 this am.  fasting this am per patient report.   5/2/18: Patient had Abdomina Toribio today before wound care. She had discontinued Bactrim PO per Dr. Horton's recommendation and culture results and is now taking Amoxicillin PO  6/20/18: Pt reports itching to wound and periwound   6/27/18: patient reports no new complaints with regards to her wound or abdomen, wound continues to decrease in size and drainage  8/1/18: Pt reports increased pain to " abdomen with nausea and emesis since thursday 7/26/18, denies any fevers, patient did no go to ER as instructed by home health nurse on Sat. 7/28/18.  8/29/18: Patient admitted to hospital 8/2 for abdominal wound complications. Surgery for abdominal abscess per Dr. Horton on 8/3. Patient placed on IV antibiotics and discharged home on 8/18 with [facility]  and PICC line to LUE. Patient currently on IV ertapenem. IV medication sent to patients home per [facility].   9/5/18: IV Ertapenem to continue 1 week. Culture sent to lab. [facility]  sent orders. PICC line intact to E.  9/12/18 Antibiotics completed. Culture results pending. 1 deep stitch remaining.  9/12/18: Culture positive for E. Coli, patient to continue Ertapenem IV antibiotics x 2weeks. [facility] notified  9/26/18: F/U abdominal wound, wound continues to improve. Patient will complete IV antibiotics today  10/3/18: patient c/o diarrhea for 2 days and nausea with some vomiting. Labs and stool culture ordered. IV antibiotics discontinued today  10/10/2018 patient will start on IV antibiotic Invaz IV once a day, pending PICC line placement, order placed today  for PICC per Dr. Horton.  10/24/18: patient on IV antibiotics, tolerating well. Continue for 1 week  11/7/2018: IV Antibiotic discontinued.  11/8/2018: PICC line to left upper arm discontinued by Home Health.  11/21/2018 F/u for abdominal wall fistula , c/o nausea  No vomiting , no fever  12/5/2018: F/u for abdominal wall fisula, c/o gas, Dr. Horton will order Bentyl. Surgery schedule for January 2019.  12/12/2018: F/u for abdominal wall fistula, c/o abdominal pain. Dr. Horton ordered Norco 5/325mg tab 1 PO every 4 hours as needed for pain and referred patient for x-ray of abdomen after clinic today.  12/19/2018: F/u for abdominal wall fisula, c/o nausea, Dr. Horton re ordered Ondansetron (Zofran) 8mg one tab by mouth every eight hours as needed for nausea. No changes in wound  condition from last visit noted in clinic today.  12/26/2018: F/u abdominal wall fistula drainage, no c/o at this time. Denies any abdominal pain or nausea. Dr. Horton recommends surgery first week of January 2019 for abdominal fistula treatment and possible colostomy placement, patient agree.  01/02/2019: F/u abdominal wall fistula. Dressing with large amount of drainage, malodorous, Dr. Horton is scheduling surgery for third week of January, 2019.  1/16/19: F/U Dr. Horton today in clinic, surgery scheduled for next Friday 1/25/19 with Dr. Horton.     01/23/2019: Presents to wound care clinic for f/u abdominal wound care tx. Surgery scheduled with Dr. Horton for Friday 01/25/2019. Dr. Horton explained Mrs. Morrison surgery procedure including possible complications, Nurse  (Cuban) present, patient verbalizes understanding of procedure including possible complications, all questions from patient were answered by Dr. Horton including blood sugar and blood pressure medications per patient's concerns. Consent for surgery and blood transfusion signed by patient, Dr. Horton and nurse witnessed.  Abdominal wound cultures collected per Dr. Horton, cultures sent to lab/micro pending results. Dr. Horton prescribed the following orders: fleet enema for Thursday 1/24/2019, clear liquid diet and not to eat after midnight all for 1/24/2019. Start taking Neomycin and erythromycin by mouth three times a day (1/24/2019) and dulcolax one tab by mouth twice a day, Mrs. Morrison voices understanding.     01/30/2019: F/U wound care treatment with Dr. Horton. Per pt, primary physician Dr. Pj Monae ordered for her to take potassium pills for three days, last day today and scheduled for lab work at primary physician clinic on 1/31/2019. Per pt. Feeling better, no more abdominal pain (went to ER 1/24/2019 and discharged 1/25/2019 c/o abd pain.). Dr. Horton awaiting on primary clearance to re-schedule surgery, per  "pt. She will call wound care clinic and Dr. Horton to make aware of clearance day. Continue with same orders for dressing change for Dr. Horton. Mrs. Morrison requesting gentamicin refill.     02/06/2019: F/u with Dr. Horton for wound care treatment. Mrs. Morrison brought to clinic a clearance for surgery by Dr. Pj Sanches dated 02/06/2019 "Hyperkalemia-Resolved K 4.8 2/1/2019, labs , Cr. 1.05, H/H 10/30. No contraindication to surgery, tight control of BS, Hydration." A copy of EKG (1/24/2019) and lab work from [facility] collected 1/31/2019, reported results 2/1/2019. Dr. Horton scheduled surgery for 02/22/2019, consent were signed on 01/23/2019, all questions were answered by Dr. Horton, this nurse  (Ugandan) present. Education provided to patient on the importance of having a clear liquid diet the day before surgery 02/21/2019 as per Dr. Horton, new medications and preop preparation before surgery, verbalizes understanding. Dr. Horton ordered Norco 5/325 mg PO for pain, Dulcolax two tabs by mouth to take on 02/21/2019, Soap suds enema (SSE) on Thursday 02/21/2019, Golytely by mouth 2 liters on 2/21/2019, Erythromycin 500 mg one tab by mouth three times a day and Neomycin 1 gm by mouth three times a day.     2/13/2019: Presents to wound care clinic for a f/u with Dr. Horton for wound care treatment. No new orders in wound dressing change, reviewed preop orders with Mrs. Morrison per Dr. Horton, all questions answered. Surgery scheduled for 02/22/2019.  2/20/19: Continues wound care a previously ordered.  Depth measured per Dr. Horton 8cm.  Preop orders reviewed with patient who verbalized understanding.  Surgery scheduled for Fri 2/22/19.  Rx given to patient for Norco and Zofran.    03/06/19: Patient admitted to [facility] on 02/22/19 for exploratory laparotomy of abdomen per Dr. Horton. Patient discharged on 02/28/19 with home health and PICC line with IV antibiotics. " "Staples removed today in clinic. Patient denies any fever, chills, n&v; however, she states that she has "low energy." Continued with wound care as ordered.     3/13/2019: F/U with Dr. Horton for wound care treatment. Continue with same wound dressing change orders as per Dr. Horton, orders followed. Home Health to continue wound dressing change and lab draw for CBC weekly; continue IV Ertapenem/Invaz 1 gm IV daily as ordered.     3/20/2019: Clinic visit with Dr. Horton for abdominal abscess treatment. Presents with moderate draining from abdominal wound; wound size decreasing per measurement. Per Dr. Horton, apply one-piece system ostomy bag to abdominal wound for drain collection, may drain bag when it fills and may change every other day, continue applying gentamicin to wound bed before applying ostomy bag. Continue IV Invaz until completion. If ostomy bag not effective for draining collection, may return to previous orders for wound dressing change. Orders followed. Education provided to Mrs. Morrison on handwashing and ostomy care techniques, voices and demonstrates understanding.   3/27/19: Follow up with Dr. Horton today in clinic for abdominal abscess, moderated tanish yellow drainge present on dressing.  Wound slowly improving, patient refused one-piece ostomy bag stated " no it's too messy." requested to return to previous dressing prior to ostomy bag placement- iodoform gauze, aquacel extra, sm abd, and mefix tape.  Silver nitrate x1 per Dr. Horton today in clinic. Rx given to patient for PO Zofran.     04/03/2019: F/u clinic visit with Dr. Horton. Abdominal wound improving in size, picture on file. Wound cultures from abdominal abscess collected and sent to lab/micro, pending results. Mrs. Morrison states going [place] of the country ([place] at the end of April and plans to stay there for approximately two months. Dr. Horton ordered IV antibiotic for two more weeks and new wound care dressing, " orders followed.     4/9/2019: Clinic visit with Dr. Horton.  Per Dr. Horton, wound deep measurement increased, draining present. Wound cultures from abdominal wound taken and sent to lab/micro, pending results. C/o abdominal pain and itching around wound, Rx for betamethasone cream 01.% and Norco 5/325mg give in clinic by Dr. Horton. Wound care dressing orders followed. Continue IV antibiotic as previously ordered.  04/17/19: F/u for non-healing wound to abdomen. Culture report negative. Dr. Horton ordered 1 more week of IV antibiotics. Continue with topical wound care as ordered.     4/24/2019: Clinic visit with Dr. Horton for abdominal wound treatment. Wound improvement present, pictures on file. Last IV antibiotic today 4/24/2019, [facility] to discontinue PICC line from left upper arm on 4/25/2019. Per Lyubov Prince, going to [place] on Friday 4/26/2019 and out of the country ([place]) on Monday 4/29/2019. This nurse spoke to Shaunna at [employer] (IV antibiotic delivery company) and informed last dose of antibiotic is today. Dr. Horton ordered Hydrocodone-acetaminophen (Norco) 5-325 mg (32 tabs), Bentyl 10 mg (120 capsules) and Gentamycin ointment. Education provided on the importance of eating food that contains iron (to prevent anemia) as well as eating meat and taking her blood pressure medications (blood pressure medication) on time, voices understanding. All questions answered by Dr. Horton. [facility] outpatient confirmed Betamethasone cream ordered on 4/10/2019 is ready for . Instructions and education provided to Mrs. Morrison on abdominal wound dressing changes, hand washing techniques and medication use, effects and side effects, voices and demonstrates understanding. Discharged home on stable conditions, [facility] will give wound care clinic a call when she is back from [place] in few months from now.     7/17/19: Readmit today to wound care clinic.  Patient was recently out  of the country visiting family in [place].  Patient complains of pain and nausea as on prior visits.  Dr. Horton seen patient today discussed with patient again placing colostomy patient refused.  Culture of wound taken, Dr. Horton restarted patient on zofran po for nausea, and Norco for pain. [facility] restarted today in clinic on Mondays and Fridays and prn. Orders given to gently pack wound with aquacel ag rope, cover with aquacel extra, 4x4 gauze, small abd pad and mefix tape change dressing ever other day by [facility] and Prn per patient.     7/24/2019: Clinic visit with Dr. Horton. Continue with dressing change as ordered. Wound cultures reviewed with patient, lab work ordered by Dr. Horton, no fever present or reported at this time. C/o decrease appetite, medication periactin prescribed. Requesting needles for insulin pen, order prescribed by Dr. Horton.      7/31/2019: F/U clinic visit with Dr. Horton. C/o of excruciated back pain 10/10, not feeling well, lot of gas and left upper quadrant discomfort. New orders prescribed: Augmentin 875-125 mg by mouth twice a day. Bentyl 10 mg one tab by mouth 4 times a day. Tramadol 50 mg one tab by mouth every 6 hours as needed for pain. Abdomen ultrasound. Wound care dressing completed as ordered.     8/7/2019: Clinic visit with Dr. Horton, denies any pain at this time. Wound care dressing done as ordered, no changes in wound size from last visit noted. Continue taking oral antiiotic as ordered, pending abdominal ultrasound, scheduled for 8/13/2019.     8/14/2019: F/U clinic visit with Dr. Horton. Admitted and discharged from emergency department this morning with abdominal pain, CT and ultrasound in filed. Dr. Horton performed a small drainage from the abdominal wound, moderate amount of creamy, serosanguineous fluid from abdominal wound present. Iodoform gauze packed into her wound, dressing changed as ordered. Wound cultures collected and sent  to lab/micro, pending results. Rx for Norco 5/325 mg one tab PO every 4 hours PRN as needed # 24 given to pt by Dr. Horton.      8/21/2019: Clinic visit with Dr. Horton. Wound dressing changed as ordered.      11/6/19: Follow up appt with Dr Horton. Wounds improving slowly. Continues to take antibiotics (Augmentin 875-125mg). No complaints voiced. Follow up in 1 week.  11/13/19:  F/U clinic visit with Dr. Horton.  Wound depth per Dr. Horton is 8.5 cm.  Continue PT Augmentin.  Patient is co left sided abdominal pain.  Abdominal US and labs ordered.  Wound care tolerated well.  Fu with Dr. Horton in 1 week.     11/20/19:Dr Horton assessed patient. Silver nitrate applied to wound. Continuing present plan of care. Patient is scheduled to have abdominal ultra sound Friday 11/22/19. Continues to take Augmentin.  F/U in 1 week.    11/27/19: Follow up in clinic with Dr. Horton. Wound depth 8.5cm, silver nitrate x 3 per Dr. Horton, patient continues taking po Augmentin, c/o left sided abdominal pain, ultrasound results discussed with patient, Dr. Horton will watch for now.  Follow up 1 week 12/4/19.     12/04/19: F/u Dr. Horton. Continue with current wound care treatment. Rx given for Bentyl 10 mg. Patient to follow up in  1 week at wound clinic  12/11/19: F/u with . Continue with current wound care treatment. Rx given for Lotrisone cream to be applied daily per patient to right lower leg rash. Patient to follow up in  1 week at wound clinic    12/18/19: F/u with Dr. Horton. Patient c/o pain 9 out of 10  to abdomen LLQ. Area of pain is warm to touch, pink, and a small nodule can be felt. Patient states that symptoms began about 4 days ago. Stat CT scan ordered. Patient will have CT done tomorrow due to not fasting this morning. Instructed patient to fast before CT scan. Continue with current wound care treatment     01/08/2020: F/u with Dr. Horton. Patient had I&D on 12/22/19 for abscess to abdomen.  New wound care orders given. Patient continues on po abx and home health for wound care. Follow up in 1 week at wound care clinic    01/15/20: F/u with Dr. Horton. Patient c/o mild discomfort at wound to left abdomen. Patient denies any fever, chills, n&v, diarrhea, and/or constipation. Will continue to monitor. Continue with current wound care treatment and follow up in  Wound clinic in 1 week.  1/29/2020: Fu today in wound care clinic with .  Wounds improving slowly.  Silver nitrate x 1 to each wound per Dr. Horton patient tolerated well.  RX give to patient today for Amoxicillin po, Bentyl Po and Norco 5/325mg Po today in clinic.  Fu 1 week 2/5/2020 with Dr. Horton.  2/5/20: F/U with Dr. Horton. LUQ site resolved. Midline site same. Cont. POC.  2/12/20: F/U with Dr. Horton. LUQ site and midline probed by Dr. Horton. Cont. Augmentin. Rx for Norco provided. Cont. POC.  2/19/20: F/U with Dr. Horton. Afebrile. No s/s of infection. Culture done of midline today. Rx for Augmentin and Zofran sent electronically to pharmacy. Return in 1 week.  2/26/2020: FU MD visit. Cultures reviewed per MD, po abx changed from augmentin to tetracycline sent to pharmacy, patient verbalized understanding. Patient states she will be traveling to [place] on March 29 for 3 months.     03/04/2020: F/u with Dr. Horton. Patient c/o pain at wound site located on left abdomen, lethargy, and no appetite for the past 3 days . Culture taken and patient instructed to stop po tetracycline while awaiting culture results. New wound care orders given for left abdominal wound. Orders routed to home health. Patient to follow up in 1 week at wound clinic.  3/11/20: F/U with Dr. Horton. No c/o for pain today. Stop tetracycline. Rx for Augmentin sent to pharmacy. Cont. POC. Return in 1 week. HH orders routed.  3/18/20: F/U with Dr. Horton. No c/o of pain at this time. Rx for Bentyl and Norco provided. Continue Augmentin. Return in 1  week.  orders routed.    Review of Systems   Constitutional: Negative.    HENT: Negative.    Eyes: Negative.    Respiratory: Negative.    Cardiovascular: Negative.    Gastrointestinal: Negative.    Genitourinary: Negative.    Musculoskeletal: Negative.    Skin: Negative.    Neurological: Negative.    Psychiatric/Behavioral: Negative.        Objective:      Physical Exam   Constitutional: She is oriented to person, place, and time. She appears well-developed and well-nourished.   HENT:   Head: Normocephalic.   Eyes: Pupils are equal, round, and reactive to light. Conjunctivae and EOM are normal.   Neck: Normal range of motion. Neck supple.   Cardiovascular: Normal rate, regular rhythm, normal heart sounds and intact distal pulses.   Pulmonary/Chest: Effort normal and breath sounds normal.   Abdominal: Soft. Bowel sounds are normal.   Musculoskeletal: Normal range of motion.   Neurological: She is alert and oriented to person, place, and time. She has normal reflexes.   Skin: Skin is warm and dry.       Assessment:       1. Abdominal wall abscess    2. Colonic fistula           Wound 02/01/20 1353 Ulceration midline Abdomen #1 (Active)   02/01/20 1353    Pre-existing: Yes   Primary Wound Type: Ulceration   Side:    Orientation: midline   Location: Abdomen   Wound/PI Number (optional): #1   Ankle-Brachial Index:    Pulses:    Removal Indication and Assessment:    Wound Outcome:    (Retired) Wound Type:    (Retired) Wound Length (cm):    (Retired) Wound Width (cm):    (Retired) Depth (cm):    Wound Description (Comments):    Removal Indications:    Dressing Appearance Moist drainage 3/18/2020  9:03 AM   Drainage Amount Moderate 3/18/2020  9:03 AM   Drainage Characteristics/Odor Yellow 3/18/2020  9:03 AM   Appearance Red;Moist 3/18/2020  9:03 AM   Tissue loss description Full thickness 3/18/2020  9:03 AM   Red (%), Wound Tissue Color 100 % 3/18/2020  9:03 AM   Periwound Area Intact 3/18/2020  9:03 AM   Wound Edges Open  3/18/2020  9:03 AM   Wound Length (cm) 0.3 cm 3/18/2020  9:03 AM   Wound Width (cm) 0.3 cm 3/18/2020  9:03 AM   Wound Depth (cm) 6 cm 3/18/2020  9:03 AM   Wound Volume (cm^3) 0.54 cm^3 3/18/2020  9:03 AM   Wound Surface Area (cm^2) 0.09 cm^2 3/18/2020  9:03 AM   Care Cleansed with:;Sterile normal saline 3/18/2020  9:03 AM   Dressing Changed;Abd pad;Gauze 3/18/2020  9:03 AM   Periwound Care Moisture barrier applied;Skin barrier film applied 3/18/2020  9:03 AM   Dressing Change Due 03/19/20 3/18/2020  9:03 AM            Wound 03/04/20 1023 Abdomen #2 (Active)   03/04/20 1023    Pre-existing:    Primary Wound Type:    Side: Left   Orientation:    Location: Abdomen   Wound/PI Number (optional): #2   Ankle-Brachial Index:    Pulses:    Removal Indication and Assessment:    Wound Outcome:    (Retired) Wound Type:    (Retired) Wound Length (cm):    (Retired) Wound Width (cm):    (Retired) Depth (cm):    Wound Description (Comments):    Removal Indications:    Dressing Appearance Intact 3/18/2020  9:01 AM   Drainage Amount None 3/18/2020  9:01 AM   Appearance Pink;Dry 3/18/2020  9:01 AM   Tissue loss description Full thickness 3/18/2020  9:01 AM   Red (%), Wound Tissue Color 100 % 3/18/2020  9:01 AM   Periwound Area Intact 3/18/2020  9:01 AM   Wound Edges Defined 3/18/2020  9:01 AM   Wound Length (cm) 0.1 cm 3/18/2020  9:01 AM   Wound Width (cm) 0.1 cm 3/18/2020  9:01 AM   Wound Depth (cm) 0 cm 3/18/2020  9:01 AM   Wound Volume (cm^3) 0 cm^3 3/18/2020  9:01 AM   Wound Surface Area (cm^2) 0.01 cm^2 3/18/2020  9:01 AM   Care Cleansed with:;Sterile normal saline 3/18/2020  9:01 AM   Dressing Changed;Abd pad;Gauze 3/18/2020  9:01 AM   Periwound Care Moisture barrier applied 3/18/2020  9:01 AM   Dressing Change Due 03/19/20 3/18/2020  9:01 AM     Abdominal wound midline   Clean wound with normal saline  Lidocaine 2% gel or 4 % Topical solution: PRN  Chauncey wound:  Maintain dry chauncey wound, Cavilon under tape, betamethasone PRN  itching  Primary dressing: Apply gentamycin to wound bed   Secondary dressing: 4 x 4 gauze, small ABD, mefix tape     LUQ abdomen   Cleanse with normal saline  Lidocaine 2% gel or 4 % Topical solution: PRN  Periwound: cavilon  Primary dressing: Apply gentamicin ointment to plain packing and gently pack to wound depth  Secondary dressing: 4x4 gauze and 5x5 aquacel border    Frequency: Monday, Wednesday, Friday    Other: Begin Augmentin.          Plan:                Follow up with Dr. Horton in 1 week on Wednesday 3/25/2020

## 2020-04-01 ENCOUNTER — HOSPITAL ENCOUNTER (OUTPATIENT)
Dept: WOUND CARE | Facility: HOSPITAL | Age: 70
Discharge: HOME | End: 2020-04-01
Attending: SURGERY
Payer: MEDICARE

## 2020-04-01 VITALS
BODY MASS INDEX: 26.16 KG/M2 | DIASTOLIC BLOOD PRESSURE: 79 MMHG | TEMPERATURE: 97 F | HEIGHT: 65 IN | WEIGHT: 157 LBS | SYSTOLIC BLOOD PRESSURE: 156 MMHG | HEART RATE: 70 BPM

## 2020-04-01 DIAGNOSIS — K63.2 COLONIC FISTULA: ICD-10-CM

## 2020-04-01 DIAGNOSIS — R10.32 LLQ PAIN: ICD-10-CM

## 2020-04-01 DIAGNOSIS — E11.9 TYPE 2 DIABETES MELLITUS WITHOUT COMPLICATION, WITHOUT LONG-TERM CURRENT USE OF INSULIN: ICD-10-CM

## 2020-04-01 DIAGNOSIS — R10.9 INTRACTABLE ABDOMINAL PAIN: ICD-10-CM

## 2020-04-01 DIAGNOSIS — Z98.890 S/P EXPLORATORY LAPAROTOMY: ICD-10-CM

## 2020-04-01 DIAGNOSIS — L02.211 ABDOMINAL WALL ABSCESS: Primary | ICD-10-CM

## 2020-04-01 DIAGNOSIS — I10 ESSENTIAL HYPERTENSION: ICD-10-CM

## 2020-04-01 PROCEDURE — 99213 OFFICE O/P EST LOW 20 MIN: CPT

## 2020-04-01 RX ORDER — AMOXICILLIN AND CLAVULANATE POTASSIUM 875; 125 MG/1; MG/1
TABLET, FILM COATED ORAL
Qty: 30 TABLET | Refills: 2 | Status: ON HOLD | OUTPATIENT
Start: 2020-04-01 | End: 2020-06-30 | Stop reason: SDUPTHER

## 2020-04-01 RX ORDER — AMOXICILLIN AND CLAVULANATE POTASSIUM 875; 125 MG/1; MG/1
TABLET, FILM COATED ORAL
Qty: 30 TABLET | Refills: 2 | Status: SHIPPED | OUTPATIENT
Start: 2020-04-01 | End: 2020-04-01 | Stop reason: SDUPTHER

## 2020-04-01 RX ORDER — HYDROCODONE BITARTRATE AND ACETAMINOPHEN 5; 325 MG/1; MG/1
TABLET ORAL
Qty: 24 TABLET | Refills: 0 | Status: ON HOLD | OUTPATIENT
Start: 2020-04-01 | End: 2020-07-14 | Stop reason: CLARIF

## 2020-04-01 RX ORDER — CLOTRIMAZOLE AND BETAMETHASONE DIPROPIONATE 10; .64 MG/G; MG/G
CREAM TOPICAL
Qty: 45 G | Refills: 0 | Status: SHIPPED | OUTPATIENT
Start: 2020-04-01 | End: 2020-04-01 | Stop reason: SDUPTHER

## 2020-04-01 RX ORDER — CLOTRIMAZOLE AND BETAMETHASONE DIPROPIONATE 10; .64 MG/G; MG/G
CREAM TOPICAL
Qty: 45 G | Refills: 0 | Status: SHIPPED | OUTPATIENT
Start: 2020-04-01 | End: 2020-05-13 | Stop reason: SDUPTHER

## 2020-04-01 NOTE — PROGRESS NOTES
"Subjective:       Patient ID: Azucena Morrison is a 69 y.o. female.    Chief Complaint: Non-healing Wound    6/29/17: Pt re-admit for distal abdominal wound. Pt denies any fevers or chills but states she feels nauseous at times. Returned to [place] June 28, from [place], reports much trouble with abdominal wound while in [place].  7/6/17-- Patient scheduled for surgical drainage of wound Tuesday 7/11/17DB  7/19/17-- Patient post op clinic visit.  8/16/17: CT of abdomen and pelvis done 8/9/17 due to suspected fistula  8/23/17-- U/S of abdomen done today.  12/13/17 Wound vac with 20cc drainage in clinic today.  1/10/18: on PO abx  1/24/18: Fistulagram ordered per Dr. Horton. Patient still on PO abx  02/21/18 Patient is not on antibiotics at this time. BS fasting 135 per patient report this am.  03/07/18 Culture of Anterior Abdominal Wound is positive. No antibiotics at this time.  fasting per patient report.    03/21/18 Patient c/o nausea along with abdominal tenderness near abd midline wound. Patient states that pain level is 6 and that she passed two sero-sanguineous clots from wound. Patient is afebrile this am.  3/28/18: patient continue antibiotics and reports that pain is less than last weeks clinic visit. Drainage has decreased as well  04/04/18 Patient states that abdominal pain is "pressure" sensation and that when she pushes down on her abdomen on either side of abdominal wound she feels like she has to urinate. Patient reports pain level of 3 this am.  fasting this am per patient report.   5/2/18: Patient had Abdomina Toribio today before wound care. She had discontinued Bactrim PO per Dr. Horton's recommendation and culture results and is now taking Amoxicillin PO  6/20/18: Pt reports itching to wound and periwound   6/27/18: patient reports no new complaints with regards to her wound or abdomen, wound continues to decrease in size and drainage  8/1/18: Pt reports increased pain to abdomen with " nausea and emesis since thursday 7/26/18, denies any fevers, patient did no go to ER as instructed by home health nurse on Sat. 7/28/18.  8/29/18: Patient admitted to hospital 8/2 for abdominal wound complications. Surgery for abdominal abscess per Dr. Horton on 8/3. Patient placed on IV antibiotics and discharged home on 8/18 with [facility]  and PICC line to LUE. Patient currently on IV ertapenem. IV medication sent to patients home per [facility].   9/5/18: IV Ertapenem to continue 1 week. Culture sent to lab. [facility]  sent orders. PICC line intact to E.  9/12/18 Antibiotics completed. Culture results pending. 1 deep stitch remaining.  9/12/18: Culture positive for E. Coli, patient to continue Ertapenem IV antibiotics x 2weeks. [facility] notified  9/26/18: F/U abdominal wound, wound continues to improve. Patient will complete IV antibiotics today  10/3/18: patient c/o diarrhea for 2 days and nausea with some vomiting. Labs and stool culture ordered. IV antibiotics discontinued today  10/10/2018 patient will start on IV antibiotic Invaz IV once a day, pending PICC line placement, order placed today  for PICC per Dr. Horton.  10/24/18: patient on IV antibiotics, tolerating well. Continue for 1 week  11/7/2018: IV Antibiotic discontinued.  11/8/2018: PICC line to left upper arm discontinued by Home Health.  11/21/2018 F/u for abdominal wall fistula , c/o nausea  No vomiting , no fever  12/5/2018: F/u for abdominal wall fisula, c/o gas, Dr. Horton will order Bentyl. Surgery schedule for January 2019.  12/12/2018: F/u for abdominal wall fistula, c/o abdominal pain. Dr. Horton ordered Norco 5/325mg tab 1 PO every 4 hours as needed for pain and referred patient for x-ray of abdomen after clinic today.  12/19/2018: F/u for abdominal wall fisula, c/o nausea, Dr. Horton re ordered Ondansetron (Zofran) 8mg one tab by mouth every eight hours as needed for nausea. No changes in wound condition from  last visit noted in clinic today.  12/26/2018: F/u abdominal wall fistula drainage, no c/o at this time. Denies any abdominal pain or nausea. Dr. Horton recommends surgery first week of January 2019 for abdominal fistula treatment and possible colostomy placement, patient agree.  01/02/2019: F/u abdominal wall fistula. Dressing with large amount of drainage, malodorous, Dr. Horton is scheduling surgery for third week of January, 2019.  1/16/19: F/U Dr. Horton today in clinic, surgery scheduled for next Friday 1/25/19 with Dr. Horton.     01/23/2019: Presents to wound care clinic for f/u abdominal wound care tx. Surgery scheduled with Dr. Horton for Friday 01/25/2019. Dr. Horton explained Mrs. Morrison surgery procedure including possible complications, Nurse  (Khmer) present, patient verbalizes understanding of procedure including possible complications, all questions from patient were answered by Dr. Horton including blood sugar and blood pressure medications per patient's concerns. Consent for surgery and blood transfusion signed by patient, Dr. Horton and nurse witnessed.  Abdominal wound cultures collected per Dr. Horton, cultures sent to lab/micro pending results. Dr. Horton prescribed the following orders: fleet enema for Thursday 1/24/2019, clear liquid diet and not to eat after midnight all for 1/24/2019. Start taking Neomycin and erythromycin by mouth three times a day (1/24/2019) and dulcolax one tab by mouth twice a day, Mrs. Morrison voices understanding.     01/30/2019: F/U wound care treatment with Dr. Horton. Per pt, primary physician Dr. Pj Monae ordered for her to take potassium pills for three days, last day today and scheduled for lab work at primary physician clinic on 1/31/2019. Per pt. Feeling better, no more abdominal pain (went to ER 1/24/2019 and discharged 1/25/2019 c/o abd pain.). Dr. Horton awaiting on primary clearance to re-schedule surgery, per pt. She will  "call wound care clinic and Dr. Horton to make aware of clearance day. Continue with same orders for dressing change for Dr. Horton. Mrs. Morrison requesting gentamicin refill.     02/06/2019: F/u with Dr. Horton for wound care treatment. Mrs. Morrison brought to clinic a clearance for surgery by Dr. Pj Sanches dated 02/06/2019 "Hyperkalemia-Resolved K 4.8 2/1/2019, labs , Cr. 1.05, H/H 10/30. No contraindication to surgery, tight control of BS, Hydration." A copy of EKG (1/24/2019) and lab work from [facility] collected 1/31/2019, reported results 2/1/2019. Dr. Horton scheduled surgery for 02/22/2019, consent were signed on 01/23/2019, all questions were answered by Dr. Horton, this nurse  (Wolof) present. Education provided to patient on the importance of having a clear liquid diet the day before surgery 02/21/2019 as per Dr. Horton, new medications and preop preparation before surgery, verbalizes understanding. Dr. Horton ordered Norco 5/325 mg PO for pain, Dulcolax two tabs by mouth to take on 02/21/2019, Soap suds enema (SSE) on Thursday 02/21/2019, Golytely by mouth 2 liters on 2/21/2019, Erythromycin 500 mg one tab by mouth three times a day and Neomycin 1 gm by mouth three times a day.     2/13/2019: Presents to wound care clinic for a f/u with Dr. Horton for wound care treatment. No new orders in wound dressing change, reviewed preop orders with Mrs. Morrison per Dr. Horton, all questions answered. Surgery scheduled for 02/22/2019.  2/20/19: Continues wound care a previously ordered.  Depth measured per Dr. Horton 8cm.  Preop orders reviewed with patient who verbalized understanding.  Surgery scheduled for Fri 2/22/19.  Rx given to patient for Norco and Zofran.    03/06/19: Patient admitted to [facility] on 02/22/19 for exploratory laparotomy of abdomen per Dr. Horton. Patient discharged on 02/28/19 with home health and PICC line with IV antibiotics. Staples removed " "today in clinic. Patient denies any fever, chills, n&v; however, she states that she has "low energy." Continued with wound care as ordered.     3/13/2019: F/U with Dr. Horton for wound care treatment. Continue with same wound dressing change orders as per Dr. Horton, orders followed. Home Health to continue wound dressing change and lab draw for CBC weekly; continue IV Ertapenem/Invaz 1 gm IV daily as ordered.     3/20/2019: Clinic visit with Dr. Horton for abdominal abscess treatment. Presents with moderate draining from abdominal wound; wound size decreasing per measurement. Per Dr. Horton, apply one-piece system ostomy bag to abdominal wound for drain collection, may drain bag when it fills and may change every other day, continue applying gentamicin to wound bed before applying ostomy bag. Continue IV Invaz until completion. If ostomy bag not effective for draining collection, may return to previous orders for wound dressing change. Orders followed. Education provided to Mrs. Morrison on handwashing and ostomy care techniques, voices and demonstrates understanding.   3/27/19: Follow up with Dr. Horton today in clinic for abdominal abscess, moderated tanish yellow drainge present on dressing.  Wound slowly improving, patient refused one-piece ostomy bag stated " no it's too messy." requested to return to previous dressing prior to ostomy bag placement- iodoform gauze, aquacel extra, sm abd, and mefix tape.  Silver nitrate x1 per Dr. Horton today in clinic. Rx given to patient for PO Zofran.     04/03/2019: F/u clinic visit with Dr. Horton. Abdominal wound improving in size, picture on file. Wound cultures from abdominal abscess collected and sent to lab/micro, pending results. Mrs. Morrison states going out of the country ([place]) at the end of April and plans to stay there for approximately two months. Dr. Horton ordered IV antibiotic for two more weeks and new wound care dressing, orders " followed.     4/9/2019: Clinic visit with Dr. Horton.  Per Dr. Horton, wound deep measurement increased, draining present. Wound cultures from abdominal wound taken and sent to lab/micro, pending results. C/o abdominal pain and itching around wound, Rx for betamethasone cream 01.% and Norco 5/325mg give in clinic by Dr. Horton. Wound care dressing orders followed. Continue IV antibiotic as previously ordered.  04/17/19: F/u for non-healing wound to abdomen. Culture report negative. Dr. Horton ordered 1 more week of IV antibiotics. Continue with topical wound care as ordered.     4/24/2019: Clinic visit with Dr. Horton for abdominal wound treatment. Wound improvement present, pictures on file. Last IV antibiotic today 4/24/2019, [facility] to discontinue PICC line from left upper arm on 4/25/2019. Per Lyubov Prince, going to [place] on Friday 4/26/2019 and out of the country ([place]) on Monday 4/29/2019. This nurse spoke to Shaunna at [employer] (IV antibiotic delivery company) and informed last dose of antibiotic is today. Dr. Horton ordered Hydrocodone-acetaminophen (Norco) 5-325 mg (32 tabs), Bentyl 10 mg (120 capsules) and Gentamycin ointment. Education provided on the importance of eating food that contains iron (to prevent anemia) as well as eating meat and taking her blood pressure medications (blood pressure medication) on time, voices understanding. All questions answered by Dr. Horton. [facility] outpatient confirmed Betamethasone cream ordered on 4/10/2019 is ready for . Instructions and education provided to Mrs. Morrison on abdominal wound dressing changes, hand washing techniques and medication use, effects and side effects, voices and demonstrates understanding. Discharged home on stable conditions, [facility] will give wound care clinic a call when she is back from [place] in few months from now.     7/17/19: Readmit today to wound care clinic.  Patient was recently out of the  country visiting family in [place].  Patient complains of pain and nausea as on prior visits.  Dr. Horton seen patient today discussed with patient again placing colostomy patient refused.  Culture of wound taken, Dr. Horton restarted patient on zofran po for nausea, and Norco for pain. [facility] restarted today in clinic on Mondays and Fridays and prn. Orders given to gently pack wound with aquacel ag rope, cover with aquacel extra, 4x4 gauze, small abd pad and mefix tape change dressing ever other day by [place] [facility] and Prn per patient.     7/24/2019: Clinic visit with Dr. Horton. Continue with dressing change as ordered. Wound cultures reviewed with patient, lab work ordered by Dr. Horton, no fever present or reported at this time. C/o decrease appetite, medication periactin prescribed. Requesting needles for insulin pen, order prescribed by Dr. Horton.      7/31/2019: F/U clinic visit with Dr. Horton. C/o of excruciated back pain 10/10, not feeling well, lot of gas and left upper quadrant discomfort. New orders prescribed: Augmentin 875-125 mg by mouth twice a day. Bentyl 10 mg one tab by mouth 4 times a day. Tramadol 50 mg one tab by mouth every 6 hours as needed for pain. Abdomen ultrasound. Wound care dressing completed as ordered.     8/7/2019: Clinic visit with Dr. Horton, denies any pain at this time. Wound care dressing done as ordered, no changes in wound size from last visit noted. Continue taking oral antiiotic as ordered, pending abdominal ultrasound, scheduled for 8/13/2019.     8/14/2019: F/U clinic visit with Dr. Horton. Admitted and discharged from emergency department this morning with abdominal pain, CT and ultrasound in filed. Dr. Horton performed a small drainage from the abdominal wound, moderate amount of creamy, serosanguineous fluid from abdominal wound present. Iodoform gauze packed into her wound, dressing changed as ordered. Wound cultures collected and sent to  lab/micro, pending results. Rx for Norco 5/325 mg one tab PO every 4 hours PRN as needed # 24 given to pt by Dr. Horton.      8/21/2019: Clinic visit with Dr. Horton. Wound dressing changed as ordered.      11/6/19: Follow up appt with Dr Horton. Wounds improving slowly. Continues to take antibiotics (Augmentin 875-125mg). No complaints voiced. Follow up in 1 week.  11/13/19:  F/U clinic visit with Dr. Horton.  Wound depth per Dr. Horton is 8.5 cm.  Continue PT Augmentin.  Patient is co left sided abdominal pain.  Abdominal US and labs ordered.  Wound care tolerated well.  Fu with Dr. Horton in 1 week.     11/20/19:Dr Horton assessed patient. Silver nitrate applied to wound. Continuing present plan of care. Patient is scheduled to have abdominal ultra sound Friday 11/22/19. Continues to take Augmentin.  F/U in 1 week.    11/27/19: Follow up in clinic with Dr. Horton. Wound depth 8.5cm, silver nitrate x 3 per Dr. Horton, patient continues taking po Augmentin, c/o left sided abdominal pain, ultrasound results discussed with patient, Dr. Horton will watch for now.  Follow up 1 week 12/4/19.     12/04/19: F/u Dr. Horton. Continue with current wound care treatment. Rx given for Bentyl 10 mg. Patient to follow up in  1 week at wound clinic  12/11/19: F/u with . Continue with current wound care treatment. Rx given for Lotrisone cream to be applied daily per patient to right lower leg rash. Patient to follow up in  1 week at wound clinic    12/18/19: F/u with Dr. Horton. Patient c/o pain 9 out of 10  to abdomen LLQ. Area of pain is warm to touch, pink, and a small nodule can be felt. Patient states that symptoms began about 4 days ago. Stat CT scan ordered. Patient will have CT done tomorrow due to not fasting this morning. Instructed patient to fast before CT scan. Continue with current wound care treatment     01/08/2020: F/u with Dr. Horton. Patient had I&D on 12/22/19 for abscess to abdomen. New  wound care orders given. Patient continues on po abx and home health for wound care. Follow up in 1 week at wound care clinic    01/15/20: F/u with Dr. Horton. Patient c/o mild discomfort at wound to left abdomen. Patient denies any fever, chills, n&v, diarrhea, and/or constipation. Will continue to monitor. Continue with current wound care treatment and follow up in  Wound clinic in 1 week.  1/29/2020: Fu today in wound care clinic with .  Wounds improving slowly.  Silver nitrate x 1 to each wound per Dr. Horton patient tolerated well.  RX give to patient today for Amoxicillin po, Bentyl Po and Norco 5/325mg Po today in clinic.  Fu 1 week 2/5/2020 with Dr. Horton.  2/5/20: F/U with Dr. Horton. LUQ site resolved. Midline site same. Cont. POC.  2/12/20: F/U with Dr. Horton. LUQ site and midline probed by Dr. Horton. Cont. Augmentin. Rx for Norco provided. Cont. POC.  2/19/20: F/U with Dr. Horton. Afebrile. No s/s of infection. Culture done of midline today. Rx for Augmentin and Zofran sent electronically to pharmacy. Return in 1 week.  2/26/2020: FU MD visit. Cultures reviewed per MD, po abx changed from augmentin to tetracycline sent to pharmacy, patient verbalized understanding. Patient states she will be traveling to [place] on March 29 for 3 months.     03/04/2020: F/u with Dr. Horton. Patient c/o pain at wound site located on left abdomen, lethargy, and no appetite for the past 3 days . Culture taken and patient instructed to stop po tetracycline while awaiting culture results. New wound care orders given for left abdominal wound. Orders routed to home health. Patient to follow up in 1 week at wound clinic.  3/11/20: F/U with Dr. Horton. No c/o for pain today. Stop tetracycline. Rx for Augmentin sent to pharmacy. Cont. POC. Return in 1 week. HH orders routed.  3/18/20: F/U with Dr. Horton. No c/o of pain at this time. Rx for Bentyl and Norco provided. Continue Augmentin. Return in 1 week. HH  orders routed.    04/01/2020: F/u with Dr. Horton. Wound showing improvement. Patient states that she has not had home health nurse come out in 2 weeks. Patient also states that she would like us to order supplies so she can begin doing her own wound care. Supplies ordered. Patient instructed on daily wound care treatment and verbalized understanding. Continue po Augmentin BID    Review of Systems   Constitutional: Negative.    HENT: Negative.    Eyes: Negative.    Respiratory: Negative.    Cardiovascular: Negative.    Gastrointestinal: Negative.    Genitourinary: Negative.    Musculoskeletal: Negative.    Skin: Negative.    Neurological: Negative.    Psychiatric/Behavioral: Negative.        Objective:      Physical Exam   Constitutional: She is oriented to person, place, and time. She appears well-developed and well-nourished.   HENT:   Head: Normocephalic.   Eyes: Pupils are equal, round, and reactive to light. Conjunctivae and EOM are normal.   Neck: Normal range of motion. Neck supple.   Cardiovascular: Normal rate, regular rhythm, normal heart sounds and intact distal pulses.   Pulmonary/Chest: Effort normal and breath sounds normal.   Abdominal: Soft. Bowel sounds are normal.   Musculoskeletal: Normal range of motion.   Neurological: She is alert and oriented to person, place, and time. She has normal reflexes.   Skin: Skin is warm and dry.       Assessment:       1. Abdominal wall abscess    2. Type 2 diabetes mellitus without complication, without long-term current use of insulin           Wound 02/01/20 1353 Ulceration midline Abdomen #1 (Active)   02/01/20 1353    Pre-existing: Yes   Primary Wound Type: Ulceration   Side:    Orientation: midline   Location: Abdomen   Wound/PI Number (optional): #1   Ankle-Brachial Index:    Pulses:    Removal Indication and Assessment:    Wound Outcome:    (Retired) Wound Type:    (Retired) Wound Length (cm):    (Retired) Wound Width (cm):    (Retired) Depth (cm):     Wound Description (Comments):    Removal Indications:    Dressing Appearance Intact;Moist drainage 4/1/2020  8:00 AM   Drainage Amount Moderate 4/1/2020  8:00 AM   Drainage Characteristics/Odor Serosanguineous 4/1/2020  8:00 AM   Appearance Pink;Red;Moist 4/1/2020  8:00 AM   Tissue loss description Full thickness 4/1/2020  8:00 AM   Red (%), Wound Tissue Color 100 % 4/1/2020  8:00 AM   Periwound Area Intact 4/1/2020  8:00 AM   Wound Edges Open 4/1/2020  8:00 AM   Wound Length (cm) 0.2 cm 4/1/2020  8:00 AM   Wound Width (cm) 0.2 cm 4/1/2020  8:00 AM   Wound Depth (cm) 0.8 cm 4/1/2020  8:00 AM   Wound Volume (cm^3) 0.03 cm^3 4/1/2020  8:00 AM   Wound Surface Area (cm^2) 0.04 cm^2 4/1/2020  8:00 AM   Care Cleansed with:;Sterile normal saline 4/1/2020  8:00 AM   Dressing Applied;Other (see comments);Calcium alginate;Gauze;Abd pad 4/1/2020  8:00 AM   Periwound Care Skin barrier film applied 4/1/2020  8:00 AM   Dressing Change Due 04/02/20 4/1/2020  8:00 AM            Wound 03/04/20 1023 Abdomen #2 (Active)   03/04/20 1023    Pre-existing:    Primary Wound Type:    Side: Left   Orientation:    Location: Abdomen   Wound/PI Number (optional): #2   Ankle-Brachial Index:    Pulses:    Removal Indication and Assessment:    Wound Outcome:    (Retired) Wound Type:    (Retired) Wound Length (cm):    (Retired) Wound Width (cm):    (Retired) Depth (cm):    Wound Description (Comments):    Removal Indications:    Dressing Appearance Intact;Moist drainage 4/1/2020  8:00 AM   Drainage Amount Moderate 4/1/2020  8:00 AM   Drainage Characteristics/Odor Serosanguineous 4/1/2020  8:00 AM   Appearance Pink;Red;Hypergranulation;Moist 4/1/2020  8:00 AM   Tissue loss description Full thickness 4/1/2020  8:00 AM   Red (%), Wound Tissue Color 100 % 4/1/2020  8:00 AM   Periwound Area Intact;Edematous 4/1/2020  8:00 AM   Wound Edges Irregular 4/1/2020  8:00 AM   Wound Length (cm) 0.6 cm 4/1/2020  8:00 AM   Wound Width (cm) 0.8 cm 4/1/2020  8:00  AM   Wound Depth (cm) 1.7 cm 4/1/2020  8:00 AM   Wound Volume (cm^3) 0.82 cm^3 4/1/2020  8:00 AM   Wound Surface Area (cm^2) 0.48 cm^2 4/1/2020  8:00 AM   Tunneling (depth (cm)/location) 6.5 cm at 8:00 4/1/2020  8:00 AM   Care Cleansed with:;Sterile normal saline 4/1/2020  8:00 AM   Dressing Applied;Other (see comments);Gauze;Calcium alginate;Abd pad 4/1/2020  8:00 AM   Packing Incision packed with;other (see comments) 4/1/2020  8:00 AM   Periwound Care Skin barrier film applied 4/1/2020  8:00 AM   Dressing Change Due 04/02/20 4/1/2020  8:00 AM       Abdominal wound midline   Clean wound with normal saline  Lidocaine 2% gel or 4 % Topical solution: PRN in clinic  Danielle wound: Cavilon, betamethasone PRN itching  Primary dressing: Apply gentamycin to wound bed   Secondary dressing: Cover with aquacel extra, 4 x 4 gauze, small ABD, and secure with mefix tape     LUQ abdomen   Cleanse with normal saline  Lidocaine 2% gel or 4 % Topical solution: PRN in clinic  Periwound: Cavilon, betamethasone PRN itching  Primary dressing: Apply gentamicin ointment to plain packing and gently pack to wound depth  Secondary dressing: Cover with aquacel extra, 4x4 gauze, small abd pad, and secure with mefix tape    Frequency: Daily per patient    Other:  Rx sent to patient's pharmacy for lotrisone cream, refill on Augmentin 875-125 mg BID, and norco 5-325 mg       Plan:                Follow up in about 1 week (around 4/8/2020) for Dr. Horton.

## 2020-04-08 ENCOUNTER — HOSPITAL ENCOUNTER (OUTPATIENT)
Dept: WOUND CARE | Facility: HOSPITAL | Age: 70
Discharge: HOME | End: 2020-04-08
Attending: SURGERY
Payer: MEDICARE

## 2020-04-08 VITALS
TEMPERATURE: 98 F | SYSTOLIC BLOOD PRESSURE: 146 MMHG | WEIGHT: 157 LBS | HEART RATE: 75 BPM | BODY MASS INDEX: 26.16 KG/M2 | DIASTOLIC BLOOD PRESSURE: 78 MMHG | HEIGHT: 65 IN

## 2020-04-08 DIAGNOSIS — K56.609 SBO (SMALL BOWEL OBSTRUCTION): ICD-10-CM

## 2020-04-08 DIAGNOSIS — E11.9 TYPE 2 DIABETES MELLITUS WITHOUT COMPLICATION, WITHOUT LONG-TERM CURRENT USE OF INSULIN: Primary | ICD-10-CM

## 2020-04-08 DIAGNOSIS — L08.9 LOCAL INFECTION OF SKIN AND SUBCUTANEOUS TISSUE: ICD-10-CM

## 2020-04-08 DIAGNOSIS — R11.0 NAUSEA: ICD-10-CM

## 2020-04-08 DIAGNOSIS — L02.211 ABDOMINAL WALL ABSCESS: ICD-10-CM

## 2020-04-08 DIAGNOSIS — I10 ESSENTIAL HYPERTENSION: ICD-10-CM

## 2020-04-08 PROCEDURE — 99213 OFFICE O/P EST LOW 20 MIN: CPT

## 2020-04-08 NOTE — PROGRESS NOTES
"Subjective:       Patient ID: Azucena Morrison is a 69 y.o. female.    Chief Complaint: Non-healing Wound    6/29/17: Pt re-admit for distal abdominal wound. Pt denies any fevers or chills but states she feels nauseous at times. Returned to [place] June 28, from [place], reports much trouble with abdominal wound while in [place].  7/6/17-- Patient scheduled for surgical drainage of wound Tuesday 7/11/17DB  7/19/17-- Patient post op clinic visit.  8/16/17: CT of abdomen and pelvis done 8/9/17 due to suspected fistula  8/23/17-- U/S of abdomen done today.  12/13/17 Wound vac with 20cc drainage in clinic today.  1/10/18: on PO abx  1/24/18: Fistulagram ordered per Dr. Horton. Patient still on PO abx  02/21/18 Patient is not on antibiotics at this time. BS fasting 135 per patient report this am.  03/07/18 Culture of Anterior Abdominal Wound is positive. No antibiotics at this time.  fasting per patient report.    03/21/18 Patient c/o nausea along with abdominal tenderness near abd midline wound. Patient states that pain level is 6 and that she passed two sero-sanguineous clots from wound. Patient is afebrile this am.  3/28/18: patient continue antibiotics and reports that pain is less than last weeks clinic visit. Drainage has decreased as well  04/04/18 Patient states that abdominal pain is "pressure" sensation and that when she pushes down on her abdomen on either side of abdominal wound she feels like she has to urinate. Patient reports pain level of 3 this am.  fasting this am per patient report.   5/2/18: Patient had Abdomina Toribio today before wound care. She had discontinued Bactrim PO per Dr. Horton's recommendation and culture results and is now taking Amoxicillin PO  6/20/18: Pt reports itching to wound and periwound   6/27/18: patient reports no new complaints with regards to her wound or abdomen, wound continues to decrease in size and drainage  8/1/18: Pt reports increased pain to abdomen with " nausea and emesis since thursday 7/26/18, denies any fevers, patient did no go to ER as instructed by home health nurse on Sat. 7/28/18.  8/29/18: Patient admitted to hospital 8/2 for abdominal wound complications. Surgery for abdominal abscess per Dr. Horton on 8/3. Patient placed on IV antibiotics and discharged home on 8/18 with [facility]  and PICC line to LUE. Patient currently on IV ertapenem. IV medication sent to patients home per Replaced by [facility].   9/5/18: IV Ertapenem to continue 1 week. Culture sent to lab. [facility]  sent orders. PICC line intact to E.  9/12/18 Antibiotics completed. Culture results pending. 1 deep stitch remaining.  9/12/18: Culture positive for E. Coli, patient to continue Ertapenem IV antibiotics x 2weeks. [facility] notified  9/26/18: F/U abdominal wound, wound continues to improve. Patient will complete IV antibiotics today  10/3/18: patient c/o diarrhea for 2 days and nausea with some vomiting. Labs and stool culture ordered. IV antibiotics discontinued today  10/10/2018 patient will start on IV antibiotic Invaz IV once a day, pending PICC line placement, order placed today  for PICC per Dr. Horton.  10/24/18: patient on IV antibiotics, tolerating well. Continue for 1 week  11/7/2018: IV Antibiotic discontinued.  11/8/2018: PICC line to left upper arm discontinued by Home Health.  11/21/2018 F/u for abdominal wall fistula , c/o nausea  No vomiting , no fever  12/5/2018: F/u for abdominal wall fisula, c/o gas, Dr. Horton will order Bentyl. Surgery schedule for January 2019.  12/12/2018: F/u for abdominal wall fistula, c/o abdominal pain. Dr. Horton ordered Norco 5/325mg tab 1 PO every 4 hours as needed for pain and referred patient for x-ray of abdomen after clinic today.  12/19/2018: F/u for abdominal wall fisula, c/o nausea, Dr. Horton re ordered Ondansetron (Zofran) 8mg one tab by mouth every eight hours as needed for nausea. No changes in wound condition from  last visit noted in clinic today.  12/26/2018: F/u abdominal wall fistula drainage, no c/o at this time. Denies any abdominal pain or nausea. Dr. Horton recommends surgery first week of January 2019 for abdominal fistula treatment and possible colostomy placement, patient agree.  01/02/2019: F/u abdominal wall fistula. Dressing with large amount of drainage, malodorous, Dr. Hroton is scheduling surgery for third week of January, 2019.  1/16/19: F/U Dr. Horton today in clinic, surgery scheduled for next Friday 1/25/19 with Dr. Horton.     01/23/2019: Presents to wound care clinic for f/u abdominal wound care tx. Surgery scheduled with Dr. Horton for Friday 01/25/2019. Dr. Horton explained Mrs. Morrison surgery procedure including possible complications, Nurse  (Mongolian) present, patient verbalizes understanding of procedure including possible complications, all questions from patient were answered by Dr. Horton including blood sugar and blood pressure medications per patient's concerns. Consent for surgery and blood transfusion signed by patient, Dr. Horton and nurse witnessed.  Abdominal wound cultures collected per Dr. Horton, cultures sent to lab/micro pending results. Dr. Horton prescribed the following orders: fleet enema for Thursday 1/24/2019, clear liquid diet and not to eat after midnight all for 1/24/2019. Start taking Neomycin and erythromycin by mouth three times a day (1/24/2019) and dulcolax one tab by mouth twice a day, Mrs. Morrison voices understanding.     01/30/2019: F/U wound care treatment with Dr. Horton. Per pt, primary physician Dr. Pj Monae ordered for her to take potassium pills for three days, last day today and scheduled for lab work at primary physician clinic on 1/31/2019. Per pt. Feeling better, no more abdominal pain (went to ER 1/24/2019 and discharged 1/25/2019 c/o abd pain.). Dr. Horton awaiting on primary clearance to re-schedule surgery, per pt. She will  "call wound care clinic and Dr. Horton to make aware of clearance day. Continue with same orders for dressing change for Dr. Horton. Mrs. Morrison requesting gentamicin refill.     02/06/2019: F/u with Dr. Horton for wound care treatment. Mrs. Morrison brought to clinic a clearance for surgery by Dr. Pj Sanches dated 02/06/2019 "Hyperkalemia-Resolved K 4.8 2/1/2019, labs , Cr. 1.05, H/H 10/30. No contraindication to surgery, tight control of BS, Hydration." A copy of EKG (1/24/2019) and lab work from [facility] collected 1/31/2019, reported results 2/1/2019. Dr. Horton scheduled surgery for 02/22/2019, consent were signed on 01/23/2019, all questions were answered by Dr. Horton, this nurse  (Lithuanian) present. Education provided to patient on the importance of having a clear liquid diet the day before surgery 02/21/2019 as per Dr. Horton, new medications and preop preparation before surgery, verbalizes understanding. Dr. Horton ordered Norco 5/325 mg PO for pain, Dulcolax two tabs by mouth to take on 02/21/2019, Soap suds enema (SSE) on Thursday 02/21/2019, Golytely by mouth 2 liters on 2/21/2019, Erythromycin 500 mg one tab by mouth three times a day and Neomycin 1 gm by mouth three times a day.     2/13/2019: Presents to wound care clinic for a f/u with Dr. Horton for wound care treatment. No new orders in wound dressing change, reviewed preop orders with Mrs. Morrison per Dr. Horton, all questions answered. Surgery scheduled for 02/22/2019.  2/20/19: Continues wound care a previously ordered.  Depth measured per Dr. Horton 8cm.  Preop orders reviewed with patient who verbalized understanding.  Surgery scheduled for Fri 2/22/19.  Rx given to patient for Norco and Zofran.    03/06/19: Patient admitted to [facility] on 02/22/19 for exploratory laparotomy of abdomen per Dr. Horton. Patient discharged on 02/28/19 with home health and PICC line with IV antibiotics. Staples removed " "today in clinic. Patient denies any fever, chills, n&v; however, she states that she has "low energy." Continued with wound care as ordered.     3/13/2019: F/U with Dr. Horton for wound care treatment. Continue with same wound dressing change orders as per Dr. Horton, orders followed. Home Health to continue wound dressing change and lab draw for CBC weekly; continue IV Ertapenem/Invaz 1 gm IV daily as ordered.     3/20/2019: Clinic visit with Dr. Horton for abdominal abscess treatment. Presents with moderate draining from abdominal wound; wound size decreasing per measurement. Per Dr. Horton, apply one-piece system ostomy bag to abdominal wound for drain collection, may drain bag when it fills and may change every other day, continue applying gentamicin to wound bed before applying ostomy bag. Continue IV Invaz until completion. If ostomy bag not effective for draining collection, may return to previous orders for wound dressing change. Orders followed. Education provided to Mrs. Morrison on handwashing and ostomy care techniques, voices and demonstrates understanding.   3/27/19: Follow up with Dr. Horton today in clinic for abdominal abscess, moderated tanish yellow drainge present on dressing.  Wound slowly improving, patient refused one-piece ostomy bag stated " no it's too messy." requested to return to previous dressing prior to ostomy bag placement- iodoform gauze, aquacel extra, sm abd, and mefix tape.  Silver nitrate x1 per Dr. Horton today in clinic. Rx given to patient for PO Zofran.     04/03/2019: F/u clinic visit with Dr. Horton. Abdominal wound improving in size, picture on file. Wound cultures from abdominal abscess collected and sent to lab/micro, pending results. Mrs. Morrison states going out of the country ([place]) at the end of April and plans to stay there for approximately two months. Dr. Horton ordered IV antibiotic for two more weeks and new wound care dressing, orders " followed.     4/9/2019: Clinic visit with Dr. Horton.  Per Dr. Horton, wound deep measurement increased, draining present. Wound cultures from abdominal wound taken and sent to lab/micro, pending results. C/o abdominal pain and itching around wound, Rx for betamethasone cream 01.% and Norco 5/325mg give in clinic by Dr. Horton. Wound care dressing orders followed. Continue IV antibiotic as previously ordered.  04/17/19: F/u for non-healing wound to abdomen. Culture report negative. Dr. Horton ordered 1 more week of IV antibiotics. Continue with topical wound care as ordered.     4/24/2019: Clinic visit with Dr. Horton for abdominal wound treatment. Wound improvement present, pictures on file. Last IV antibiotic today 4/24/2019, [facility] to discontinue PICC line from left upper arm on 4/25/2019. Per Lyubov Prince, going to [place] on Friday 4/26/2019 and out of the country ([place]) on Monday 4/29/2019. This nurse spoke to Shaunna at [employer] ([employer] delivery [employer]) and informed last dose of antibiotic is today. Dr. Horton ordered Hydrocodone-acetaminophen (Norco) 5-325 mg (32 tabs), Bentyl 10 mg (120 capsules) and Gentamycin ointment. Education provided on the importance of eating food that contains iron (to prevent anemia) as well as eating meat and taking her blood pressure medications (blood pressure medication) on time, voices understanding. All questions answered by Dr. Horton. [facility] outpatient confirmed Betamethasone cream ordered on 4/10/2019 is ready for . Instructions and education provided to Mrs. Morrison on abdominal wound dressing changes, hand washing techniques and medication use, effects and side effects, voices and demonstrates understanding. Discharged home on stable conditions, [facility] will give wound care clinic a call when she is back from [place] in few months from now.     7/17/19: Readmit today to wound care clinic.  Patient was recently out of the  country visiting family in [place].  Patient complains of pain and nausea as on prior visits.  Dr. Horton seen patient today discussed with patient again placing colostomy patient refused.  Culture of wound taken, Dr. Horton restarted patient on zofran po for nausea, and Norco for pain. [facility] restarted today in clinic on Mondays and Fridays and prn. Orders given to gently pack wound with aquacel ag rope, cover with aquacel extra, 4x4 gauze, small abd pad and mefix tape change dressing ever other day by [facility] and Prn per patient.     7/24/2019: Clinic visit with Dr. Horton. Continue with dressing change as ordered. Wound cultures reviewed with patient, lab work ordered by Dr. Horton, no fever present or reported at this time. C/o decrease appetite, medication periactin prescribed. Requesting needles for insulin pen, order prescribed by Dr. Horton.      7/31/2019: F/U clinic visit with Dr. Horton. C/o of excruciated back pain 10/10, not feeling well, lot of gas and left upper quadrant discomfort. New orders prescribed: Augmentin 875-125 mg by mouth twice a day. Bentyl 10 mg one tab by mouth 4 times a day. Tramadol 50 mg one tab by mouth every 6 hours as needed for pain. Abdomen ultrasound. Wound care dressing completed as ordered.     8/7/2019: Clinic visit with Dr. Horton, denies any pain at this time. Wound care dressing done as ordered, no changes in wound size from last visit noted. Continue taking oral antiiotic as ordered, pending abdominal ultrasound, scheduled for 8/13/2019.     8/14/2019: F/U clinic visit with Dr. Horton. Admitted and discharged from emergency department this morning with abdominal pain, CT and ultrasound in filed. Dr. Horton performed a small drainage from the abdominal wound, moderate amount of creamy, serosanguineous fluid from abdominal wound present. Iodoform gauze packed into her wound, dressing changed as ordered. Wound cultures collected and sent to  lab/micro, pending results. Rx for Norco 5/325 mg one tab PO every 4 hours PRN as needed # 24 given to pt by Dr. Horton.      8/21/2019: Clinic visit with Dr. Horton. Wound dressing changed as ordered.      11/6/19: Follow up appt with Dr Horton. Wounds improving slowly. Continues to take antibiotics (Augmentin 875-125mg). No complaints voiced. Follow up in 1 week.  11/13/19:  F/U clinic visit with Dr. Horton.  Wound depth per Dr. Horton is 8.5 cm.  Continue PT Augmentin.  Patient is co left sided abdominal pain.  Abdominal US and labs ordered.  Wound care tolerated well.  Fu with Dr. Horton in 1 week.     11/20/19:Dr Horton assessed patient. Silver nitrate applied to wound. Continuing present plan of care. Patient is scheduled to have abdominal ultra sound Friday 11/22/19. Continues to take Augmentin.  F/U in 1 week.    11/27/19: Follow up in clinic with Dr. Horton. Wound depth 8.5cm, silver nitrate x 3 per Dr. Horton, patient continues taking po Augmentin, c/o left sided abdominal pain, ultrasound results discussed with patient, Dr. Horton will watch for now.  Follow up 1 week 12/4/19.     12/04/19: F/u Dr. Horton. Continue with current wound care treatment. Rx given for Bentyl 10 mg. Patient to follow up in  1 week at wound clinic  12/11/19: F/u with . Continue with current wound care treatment. Rx given for Lotrisone cream to be applied daily per patient to right lower leg rash. Patient to follow up in  1 week at wound clinic    12/18/19: F/u with Dr. Horton. Patient c/o pain 9 out of 10  to abdomen LLQ. Area of pain is warm to touch, pink, and a small nodule can be felt. Patient states that symptoms began about 4 days ago. Stat CT scan ordered. Patient will have CT done tomorrow due to not fasting this morning. Instructed patient to fast before CT scan. Continue with current wound care treatment     01/08/2020: F/u with Dr. Horton. Patient had I&D on 12/22/19 for abscess to abdomen. New  wound care orders given. Patient continues on po abx and home health for wound care. Follow up in 1 week at wound care clinic    01/15/20: F/u with Dr. Horton. Patient c/o mild discomfort at wound to left abdomen. Patient denies any fever, chills, n&v, diarrhea, and/or constipation. Will continue to monitor. Continue with current wound care treatment and follow up in  Wound clinic in 1 week.  1/29/2020: Fu today in wound care clinic with .  Wounds improving slowly.  Silver nitrate x 1 to each wound per Dr. Horton patient tolerated well.  RX give to patient today for Amoxicillin po, Bentyl Po and Norco 5/325mg Po today in clinic.  Fu 1 week 2/5/2020 with Dr. Horton.  2/5/20: F/U with Dr. Horton. LUQ site resolved. Midline site same. Cont. POC.  2/12/20: F/U with Dr. Horton. LUQ site and midline probed by Dr. Horton. Cont. Augmentin. Rx for Norco provided. Cont. POC.  2/19/20: F/U with Dr. Horton. Afebrile. No s/s of infection. Culture done of midline today. Rx for Augmentin and Zofran sent electronically to pharmacy. Return in 1 week.  2/26/2020: FU MD visit. Cultures reviewed per MD, po abx changed from augmentin to tetracycline sent to pharmacy, patient verbalized understanding. Patient states she will be traveling to [place] on March 29 for 3 months.     03/04/2020: F/u with Dr. Horton. Patient c/o pain at wound site located on left abdomen, lethargy, and no appetite for the past 3 days . Culture taken and patient instructed to stop po tetracycline while awaiting culture results. New wound care orders given for left abdominal wound. Orders routed to home health. Patient to follow up in 1 week at wound clinic.  3/11/20: F/U with Dr. Horton. No c/o for pain today. Stop tetracycline. Rx for Augmentin sent to pharmacy. Cont. POC. Return in 1 week. HH orders routed.  3/18/20: F/U with Dr. Horton. No c/o of pain at this time. Rx for Bentyl and Norco provided. Continue Augmentin. Return in 1 week. HH  orders routed.    04/01/2020: F/u with Dr. Horton. Wound showing improvement. Patient states that she has not had home health nurse come out in 2 weeks. Patient also states that she would like us to order supplies so she can begin doing her own wound care. Supplies ordered. Patient instructed on daily wound care treatment and verbalized understanding. Continue po Augmentin BID  04/08/2020: F/u with Dr. Horton. Wound improving. Continue with current wound care treatment. Patient given rx for norco 5/325 mg and zofran 8 mg. No new complaints per patient today    Review of Systems    Objective:      Physical Exam    Assessment:       1. Type 2 diabetes mellitus without complication, without long-term current use of insulin    2. Abdominal wall abscess           Wound 02/01/20 1353 Ulceration midline Abdomen #1 (Active)   02/01/20 1353    Pre-existing: Yes   Primary Wound Type: Ulceration   Side:    Orientation: midline   Location: Abdomen   Wound/PI Number (optional): #1   Ankle-Brachial Index:    Pulses:    Removal Indication and Assessment:    Wound Outcome:    (Retired) Wound Type:    (Retired) Wound Length (cm):    (Retired) Wound Width (cm):    (Retired) Depth (cm):    Wound Description (Comments):    Removal Indications:    Dressing Appearance Intact;Moist drainage 4/8/2020  9:00 AM   Drainage Amount Moderate 4/8/2020  9:00 AM   Drainage Characteristics/Odor Serosanguineous 4/8/2020  9:00 AM   Appearance Pink;Red;Moist 4/8/2020  9:00 AM   Tissue loss description Full thickness 4/8/2020  9:00 AM   Red (%), Wound Tissue Color 100 % 4/8/2020  9:00 AM   Periwound Area Intact 4/8/2020  9:00 AM   Wound Edges Open 4/8/2020  9:00 AM   Wound Length (cm) 0.3 cm 4/8/2020  9:00 AM   Wound Width (cm) 0.3 cm 4/8/2020  9:00 AM   Wound Depth (cm) 0.4 cm 4/8/2020  9:00 AM   Wound Volume (cm^3) 0.04 cm^3 4/8/2020  9:00 AM   Wound Surface Area (cm^2) 0.09 cm^2 4/8/2020  9:00 AM   Care Cleansed with:;Sterile normal saline 4/8/2020   9:00 AM   Dressing Applied;Calcium alginate;Gauze;Abd pad;Other (see comments) 4/8/2020  9:00 AM   Periwound Care Skin barrier film applied 4/8/2020  9:00 AM   Dressing Change Due 04/09/20 4/8/2020  9:00 AM            Wound 03/04/20 1023 Abdomen #2 (Active)   03/04/20 1023    Pre-existing:    Primary Wound Type:    Side: Left   Orientation:    Location: Abdomen   Wound/PI Number (optional): #2   Ankle-Brachial Index:    Pulses:    Removal Indication and Assessment:    Wound Outcome:    (Retired) Wound Type:    (Retired) Wound Length (cm):    (Retired) Wound Width (cm):    (Retired) Depth (cm):    Wound Description (Comments):    Removal Indications:    Dressing Appearance Intact;Moist drainage 4/8/2020  9:00 AM   Drainage Amount Moderate 4/8/2020  9:00 AM   Drainage Characteristics/Odor Serosanguineous 4/8/2020  9:00 AM   Appearance Pink;Red;Moist 4/8/2020  9:00 AM   Tissue loss description Full thickness 4/8/2020  9:00 AM   Red (%), Wound Tissue Color 100 % 4/8/2020  9:00 AM   Periwound Area Intact 4/8/2020  9:00 AM   Wound Edges Irregular 4/8/2020  9:00 AM   Wound Length (cm) 0.5 cm 4/8/2020  9:00 AM   Wound Width (cm) 0.8 cm 4/8/2020  9:00 AM   Wound Depth (cm) 1.8 cm 4/8/2020  9:00 AM   Wound Volume (cm^3) 0.72 cm^3 4/8/2020  9:00 AM   Wound Surface Area (cm^2) 0.4 cm^2 4/8/2020  9:00 AM   Tunneling (depth (cm)/location) 6.5 cm at 8:00 4/8/2020  9:00 AM   Care Cleansed with:;Sterile normal saline 4/8/2020  9:00 AM   Dressing Applied;Calcium alginate;Other (see comments);Gauze;Abd pad 4/8/2020  9:00 AM   Packing Incision packed with;other (see comments) 4/8/2020  9:00 AM   Periwound Care Skin barrier film applied 4/8/2020  9:00 AM   Dressing Change Due 04/09/20 4/8/2020  9:00 AM       Abdominal wound midline   Clean wound with normal saline  Lidocaine 2% gel or 4 % Topical solution: PRN in clinic  Danielle wound: Cavilon, betamethasone PRN itching  Primary dressing: Apply gentamycin to wound bed   Secondary  dressing: Cover with aquacel extra, 4 x 4 gauze, small ABD, and secure with mefix tape     LUQ abdomen   Cleanse with normal saline  Lidocaine 2% gel or 4 % Topical solution: PRN in clinic  Periwound: Cavilon, betamethasone PRN itching  Primary dressing: Apply gentamicin ointment to plain packing and gently pack to wound depth  Secondary dressing: Cover with aquacel extra, 4x4 gauze, small abd pad, and secure with mefix tape    Frequency: Daily per patient    Other:  Rx given to patient for Norco 5-325 mg and Zofran 8 mg        Plan:                Follow up in about 1 week (around 4/15/2020) for Dr. Horton.

## 2020-04-22 ENCOUNTER — HOSPITAL ENCOUNTER (OUTPATIENT)
Dept: WOUND CARE | Facility: HOSPITAL | Age: 70
Discharge: HOME | End: 2020-04-22
Attending: SURGERY
Payer: MEDICARE

## 2020-04-22 VITALS
WEIGHT: 157 LBS | TEMPERATURE: 98 F | SYSTOLIC BLOOD PRESSURE: 171 MMHG | DIASTOLIC BLOOD PRESSURE: 96 MMHG | BODY MASS INDEX: 26.16 KG/M2 | HEIGHT: 65 IN | HEART RATE: 83 BPM

## 2020-04-22 DIAGNOSIS — Z98.890 S/P EXPLORATORY LAPAROTOMY: ICD-10-CM

## 2020-04-22 DIAGNOSIS — K63.2 COLONIC FISTULA: ICD-10-CM

## 2020-04-22 DIAGNOSIS — T81.89XD SUTURE GRANULOMA, SUBSEQUENT ENCOUNTER: ICD-10-CM

## 2020-04-22 DIAGNOSIS — K56.609 SMALL BOWEL OBSTRUCTION: ICD-10-CM

## 2020-04-22 DIAGNOSIS — R10.32 LLQ PAIN: ICD-10-CM

## 2020-04-22 DIAGNOSIS — L08.9 LOCAL INFECTION OF SKIN AND SUBCUTANEOUS TISSUE: Primary | ICD-10-CM

## 2020-04-22 PROCEDURE — 99213 OFFICE O/P EST LOW 20 MIN: CPT

## 2020-04-22 NOTE — PROGRESS NOTES
"[facility]                Progress Note    Subjective:       Patient ID: Azucena Morrison is a 69 y.o. female.    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 Pt denies any fevers or chills but states she feels nauseous at times. Returned to [place] June 28, from [place], reports much trouble with abdominal wound while in [place].  7/6/17-- Patient scheduled for surgical drainage of wound Tuesday 7/11/17DB 7/19/17-- Patient post op clinic visit.  8/16/17: CT of abdomen and pelvis done 8/9/17 due to suspected fistula  8/23/17-- U/S of abdomen done today.  12/13/17 Wound vac with 20cc drainage in clinic today.  1/10/18: on PO abx  1/24/18: Fistulagram ordered per Dr. Horton. Patient still on PO abx  02/21/18 Patient is not on antibiotics at this time. BS fasting 135 per patient report this am.  03/07/18 Culture of Anterior Abdominal Wound is positive. No antibiotics at this time.  fasting per patient report.    03/21/18 Patient c/o nausea along with abdominal tenderness near abd midline wound. Patient states that pain level is 6 and that she passed two sero-sanguineous clots from wound. Patient is afebrile this am.  3/28/18: patient continue antibiotics and reports that pain is less than last weeks clinic visit. Drainage has decreased as well  04/04/18 Patient states that abdominal pain is "pressure" sensation and that when she pushes down on her abdomen on either side of abdominal wound she feels like she has to urinate. Patient reports pain level of 3 this am.  fasting this am per patient report.   5/2/18: Patient had Abdomina Toribio today before wound care. She had discontinued Bactrim PO per Dr. Horton's recommendation and culture results and is now taking Amoxicillin PO  6/20/18: Pt reports itching to wound and periwound   6/27/18: patient reports no new " complaints with regards to her wound or abdomen, wound continues to decrease in size and drainage  8/1/18: Pt reports increased pain to abdomen with nausea and emesis since thursday 7/26/18, denies any fevers, patient did no go to ER as instructed by home health nurse on Sat. 7/28/18.  8/29/18: Patient admitted to hospital 8/2 for abdominal wound complications. Surgery for abdominal abscess per Dr. Horton on 8/3. Patient placed on IV antibiotics and discharged home on 8/18 with [facility] and [facility] line to [facility]. Patient currently on IV ertapenem. IV medication sent to patients home per [facility] doxo.   9/5/18: IV Ertapenem to continue 1 week. Culture sent to lab. [facility] sent orders. PICC line intact to [facility].  9/12/18 Antibiotics completed. Culture results pending. 1 deep stitch remaining.  9/12/18: Culture positive for E. Coli, patient to continue Ertapenem IV antibiotics x 2weeks. [facility] notified  9/26/18: F/U abdominal wound, wound continues to improve. Patient will complete IV antibiotics today  10/3/18: patient c/o diarrhea for 2 days and nausea with some vomiting. Labs and stool culture ordered. IV antibiotics discontinued today  10/10/2018 patient will start on IV antibiotic Invaz IV once a day, pending PICC line placement, order placed today  for PICC per Dr. Horton.  10/24/18: patient on IV antibiotics, tolerating well. Continue for 1 week  11/7/2018: IV Antibiotic discontinued.  11/8/2018: PICC line to left upper arm discontinued by Home Health.  11/21/2018 F/u for abdominal wall fistula , c/o nausea  No vomiting , no fever  12/5/2018: F/u for abdominal wall fisula, c/o gas, Dr. Horton will order Bentyl. Surgery schedule for January 2019.  12/12/2018: F/u for abdominal wall fistula, c/o abdominal pain. Dr. Horton ordered Norco 5/325mg tab 1 PO every 4 hours as needed for pain and referred patient for x-ray of abdomen after clinic today.  12/19/2018: F/u for abdominal wall fisula, c/o  nausea, Dr. Horton re ordered Ondansetron (Zofran) 8mg one tab by mouth every eight hours as needed for nausea. No changes in wound condition from last visit noted in clinic today.  12/26/2018: F/u abdominal wall fistula drainage, no c/o at this time. Denies any abdominal pain or nausea. Dr. Horton recommends surgery first week of January 2019 for abdominal fistula treatment and possible colostomy placement, patient agree.  01/02/2019: F/u abdominal wall fistula. Dressing with large amount of drainage, malodorous, Dr. Horton is scheduling surgery for third week of January, 2019.  1/16/19: F/U Dr. Horton today in clinic, surgery scheduled for next Friday 1/25/19 with Dr. Horton.     01/23/2019: Presents to wound care clinic for f/u abdominal wound care tx. Surgery scheduled with Dr. Horton for Friday 01/25/2019. Dr. Horton explained Mrs. Morrison surgery procedure including possible complications, Nurse  (Pashto) present, patient verbalizes understanding of procedure including possible complications, all questions from patient were answered by Dr. Horton including blood sugar and blood pressure medications per patient's concerns. Consent for surgery and blood transfusion signed by patient, Dr. Horton and nurse witnessed.  Abdominal wound cultures collected per Dr. Horton, cultures sent to lab/micro pending results. Dr. Horton prescribed the following orders: fleet enema for Thursday 1/24/2019, clear liquid diet and not to eat after midnight all for 1/24/2019. Start taking Neomycin and erythromycin by mouth three times a day (1/24/2019) and dulcolax one tab by mouth twice a day, Mrs. Morrison voices understanding.     01/30/2019: F/U wound care treatment with Dr. Horton. Per pt, primary physician Dr. Pj Monae ordered for her to take potassium pills for three days, last day today and scheduled for lab work at primary physician clinic on 1/31/2019. Per pt. Feeling better, no more abdominal pain  "(went to ER 1/24/2019 and discharged 1/25/2019 c/o abd pain.). Dr. Horton awaiting on primary clearance to re-schedule surgery, per pt. She will call wound care clinic and Dr. Horton to make aware of clearance day. Continue with same orders for dressing change for Dr. Horton. Mrs. Morrison requesting gentamicin refill.     02/06/2019: F/u with Dr. Horton for wound care treatment. Mrs. Morrison brought to clinic a clearance for surgery by Dr. Pj Sanches dated 02/06/2019 "Hyperkalemia-Resolved K 4.8 2/1/2019, labs , Cr. 1.05, H/H 10/30. No contraindication to surgery, tight control of BS, Hydration." A copy of EKG (1/24/2019) and lab work from [employer] collected 1/31/2019, reported results 2/1/2019. Dr. Horton scheduled surgery for 02/22/2019, consent were signed on 01/23/2019, all questions were answered by Dr. Horton, this nurse  (Yakut) present. Education provided to patient on the importance of having a clear liquid diet the day before surgery 02/21/2019 as per Dr. Horton, new medications and preop preparation before surgery, verbalizes understanding. Dr. Horton ordered Norco 5/325 mg PO for pain, Dulcolax two tabs by mouth to take on 02/21/2019, Soap suds enema (SSE) on Thursday 02/21/2019, Golytely by mouth 2 liters on 2/21/2019, Erythromycin 500 mg one tab by mouth three times a day and Neomycin 1 gm by mouth three times a day.     2/13/2019: Presents to wound care clinic for a f/u with Dr. Horton for wound care treatment. No new orders in wound dressing change, reviewed preop orders with Mrs. Morrison per Dr. Horton, all questions answered. Surgery scheduled for 02/22/2019.  2/20/19: Continues wound care a previously ordered.  Depth measured per Dr. Horton 8cm.  Preop orders reviewed with patient who verbalized understanding.  Surgery scheduled for Fri 2/22/19.  Rx given to patient for Norco and Zofran.    03/06/19: Patient admitted to [facility] on 02/22/19 for " "exploratory laparotomy of abdomen per Dr. Horton. Patient discharged on 02/28/19 with home health and PICC line with IV antibiotics. Staples removed today in clinic. Patient denies any fever, chills, n&v; however, she states that she has "low energy." Continued with wound care as ordered.     3/13/2019: F/U with Dr. Horton for wound care treatment. Continue with same wound dressing change orders as per Dr. Horton, orders followed. Home Health to continue wound dressing change and lab draw for CBC weekly; continue IV Ertapenem/Invaz 1 gm IV daily as ordered.     3/20/2019: Clinic visit with Dr. Horton for abdominal abscess treatment. Presents with moderate draining from abdominal wound; wound size decreasing per measurement. Per Dr. Horton, apply one-piece system ostomy bag to abdominal wound for drain collection, may drain bag when it fills and may change every other day, continue applying gentamicin to wound bed before applying ostomy bag. Continue IV Invaz until completion. If ostomy bag not effective for draining collection, may return to previous orders for wound dressing change. Orders followed. Education provided to Mrs. Morrison on handwashing and ostomy care techniques, voices and demonstrates understanding.   3/27/19: Follow up with Dr. Horton today in clinic for abdominal abscess, moderated tanish yellow drainge present on dressing.  Wound slowly improving, patient refused one-piece ostomy bag stated " no it's too messy." requested to return to previous dressing prior to ostomy bag placement- iodoform gauze, aquacel extra, sm abd, and mefix tape.  Silver nitrate x1 per Dr. Horton today in clinic. Rx given to patient for PO Zofran.     04/03/2019: F/u clinic visit with Dr. Horton. Abdominal wound improving in size, picture on file. Wound cultures from abdominal abscess collected and sent to lab/micro, pending results. Mrs. Morrison states going out of the country ([place] at the end of April and " plans to stay there for approximately two months. Dr. Horton ordered IV antibiotic for two more weeks and new wound care dressing, orders followed.     4/9/2019: Clinic visit with Dr. Horton.  Per Dr. Horton, wound deep measurement increased, draining present. Wound cultures from abdominal wound taken and sent to lab/micro, pending results. C/o abdominal pain and itching around wound, Rx for betamethasone cream 01.% and Norco 5/325mg give in clinic by Dr. Horton. Wound care dressing orders followed. Continue IV antibiotic as previously ordered.  04/17/19: F/u for non-healing wound to abdomen. Culture report negative. Dr. Horton ordered 1 more week of IV antibiotics. Continue with topical wound care as ordered.     4/24/2019: Clinic visit with Dr. Horton for abdominal wound treatment. Wound improvement present, pictures on file. Last IV antibiotic today 4/24/2019, [facility] to discontinue PICC line from left upper arm on 4/25/2019. Per Lyubov Prince, going to [place] on Friday 4/26/2019 and out of the country ([place]) on Monday 4/29/2019. This nurse spoke to Shaunna at [employer] (IV antibiotic delivery company) and informed last dose of antibiotic is today. Dr. Horton ordered Hydrocodone-acetaminophen (Norco) 5-325 mg (32 tabs), Bentyl 10 mg (120 capsules) and Gentamycin ointment. Education provided on the importance of eating food that contains iron (to prevent anemia) as well as eating meat and taking her blood pressure medications (blood pressure medication) on time, voices understanding. All questions answered by Dr. Horton. [facility] outpatient confirmed Betamethasone cream ordered on 4/10/2019 is ready for . Instructions and education provided to Mrs. Morriosn on abdominal wound dressing changes, hand washing techniques and medication use, effects and side effects, voices and demonstrates understanding. Discharged home on stable conditions, [facility] [place] [facility] give wound care clinic a  call when she is back from [place] in few months from now.     7/17/19: Readmit today to wound care clinic.  Patient was recently out of the country visiting family in [place].  Patient complains of pain and nausea as on prior visits.  Dr. Horton seen patient today discussed with patient again placing colostomy patient refused.  Culture of wound taken, Dr. Horton restarted patient on zofran po for nausea, and Norco for pain. [facility] restarted today in clinic on Mondays and Fridays and prn. Orders given to gently pack wound with aquacel ag rope, cover with aquacel extra, 4x4 gauze, small abd pad and mefix tape change dressing ever other day by [facility] and Prn per patient.     7/24/2019: Clinic visit with Dr. Horton. Continue with dressing change as ordered. Wound cultures reviewed with patient, lab work ordered by Dr. Horton, no fever present or reported at this time. C/o decrease appetite, medication periactin prescribed. Requesting needles for insulin pen, order prescribed by Dr. Horton.      7/31/2019: F/U clinic visit with Dr. Horton. C/o of excruciated back pain 10/10, not feeling well, lot of gas and left upper quadrant discomfort. New orders prescribed: Augmentin 875-125 mg by mouth twice a day. Bentyl 10 mg one tab by mouth 4 times a day. Tramadol 50 mg one tab by mouth every 6 hours as needed for pain. Abdomen ultrasound. Wound care dressing completed as ordered.     8/7/2019: Clinic visit with Dr. Horton, denies any pain at this time. Wound care dressing done as ordered, no changes in wound size from last visit noted. Continue taking oral antiiotic as ordered, pending abdominal ultrasound, scheduled for 8/13/2019.     8/14/2019: F/U clinic visit with Dr. Horton. Admitted and discharged from emergency department this morning with abdominal pain, CT and ultrasound in filed. Dr. Horton performed a small drainage from the abdominal wound, moderate amount of creamy, serosanguineous  fluid from abdominal wound present. Iodoform gauze packed into her wound, dressing changed as ordered. Wound cultures collected and sent to lab/micro, pending results. Rx for Norco 5/325 mg one tab PO every 4 hours PRN as needed # 24 given to pt by Dr. Horton.      8/21/2019: Clinic visit with Dr. Horton. Wound dressing changed as ordered.      11/6/19: Follow up appt with Dr Horton. Wounds improving slowly. Continues to take antibiotics (Augmentin 875-125mg). No complaints voiced. Follow up in 1 week.  11/13/19:  F/U clinic visit with Dr. Horton.  Wound depth per Dr. Horton is 8.5 cm.  Continue PT Augmentin.  Patient is co left sided abdominal pain.  Abdominal US and labs ordered.  Wound care tolerated well.  Fu with Dr. Horton in 1 week.      11/20/19:Dr Horton assessed patient. Silver nitrate applied to wound. Continuing present plan of care. Patient is scheduled to have abdominal ultra sound Friday 11/22/19. Continues to take Augmentin.  F/U in 1 week.    11/27/19: Follow up in clinic with Dr. Horton. Wound depth 8.5cm, silver nitrate x 3 per Dr. Horton, patient continues taking po Augmentin, c/o left sided abdominal pain, ultrasound results discussed with patient, Dr. Horton will watch for now.  Follow up 1 week 12/4/19.      12/04/19: F/u Dr. Horton. Continue with current wound care treatment. Rx given for Bentyl 10 mg. Patient to follow up in  1 week at wound clinic  12/11/19: F/u with . Continue with current wound care treatment. Rx given for Lotrisone cream to be applied daily per patient to right lower leg rash. Patient to follow up in  1 week at wound clinic     12/18/19: F/u with Dr. Horton. Patient c/o pain 9 out of 10  to abdomen LLQ. Area of pain is warm to touch, pink, and a small nodule can be felt. Patient states that symptoms began about 4 days ago. Stat CT scan ordered. Patient will have CT done tomorrow due to not fasting this morning. Instructed patient to fast before CT  scan. Continue with current wound care treatment      01/08/2020: F/u with Dr. Horton. Patient had I&D on 12/22/19 for abscess to abdomen. New wound care orders given. Patient continues on po abx and home health for wound care. Follow up in 1 week at wound care clinic     01/15/20: F/u with Dr. Horton. Patient c/o mild discomfort at wound to left abdomen. Patient denies any fever, chills, n&v, diarrhea, and/or constipation. Will continue to monitor. Continue with current wound care treatment and follow up in  Wound clinic in 1 week.  1/29/2020: Fu today in wound care clinic with .  Wounds improving slowly.  Silver nitrate x 1 to each wound per Dr. Horton patient tolerated well.  RX give to patient today for Amoxicillin po, Bentyl Po and Norco 5/325mg Po today in clinic.  Fu 1 week 2/5/2020 with Dr. Horton.  2/5/20: F/U with Dr. Horton. LUQ site resolved. Midline site same. Cont. POC.  2/12/20: F/U with Dr. Horton. LUQ site and midline probed by Dr. Horton. Cont. Augmentin. Rx for Norco provided. Cont. POC.  2/19/20: F/U with Dr. Horton. Afebrile. No s/s of infection. Culture done of midline today. Rx for Augmentin and Zofran sent electronically to pharmacy. Return in 1 week.  2/26/2020: FU MD visit. Cultures reviewed per MD, po abx changed from augmentin to tetracycline sent to pharmacy, patient verbalized understanding. Patient states she will be traveling to [place] on March 29 for 3 months.      03/04/2020: F/u with Dr. Horton. Patient c/o pain at wound site located on left abdomen, lethargy, and no appetite for the past 3 days . Culture taken and patient instructed to stop po tetracycline while awaiting culture results. New wound care orders given for left abdominal wound. Orders routed to home health. Patient to follow up in 1 week at wound clinic.  3/11/20: F/U with Dr. Horton. No c/o for pain today. Stop tetracycline. Rx for Augmentin sent to pharmacy. Cont. POC. Return in 1 week. HH  orders routed.  3/18/20: F/U with Dr. Horton. No c/o of pain at this time. Rx for Bentyl and Norco provided. Continue Augmentin. Return in 1 week.  orders routed.     04/01/2020: F/u with Dr. Horton. Wound showing improvement. Patient states that she has not had home health nurse come out in 2 weeks. Patient also states that she would like us to order supplies so she can begin doing her own wound care. Supplies ordered. Patient instructed on daily wound care treatment and verbalized understanding. Continue po Augmentin BID  04/08/2020: F/u with Dr. Horton. Wound improving. Continue with current wound care treatment. Patient given rx for norco 5/325 mg and zofran 8 mg. No new complaints per patient today   04/22/20  F/U with Dr. Horton.  Wound unchanged.  Pack wounds with plain nu gauze with gentamicin ointment.  Patient given RX for Norco 5/325 mg, Zofran 8 mg, Amoxiciilin .  Patient to follow  Up with Dr. Horton in 1 week.  Wound care supplies ordered.  Review of Systems   Constitutional: Negative.    HENT: Negative.    Eyes: Negative.    Respiratory: Negative.    Cardiovascular: Negative.    Gastrointestinal: Negative.    Genitourinary: Negative.    Musculoskeletal: Negative.    Skin: Negative.    Neurological: Negative.    Psychiatric/Behavioral: Negative.          Objective:        Physical Exam   Constitutional: She is oriented to person, place, and time. She appears well-developed and well-nourished.   HENT:   Head: Normocephalic.   Eyes: Pupils are equal, round, and reactive to light. Conjunctivae and EOM are normal.   Neck: Normal range of motion. Neck supple.   Cardiovascular: Normal rate, regular rhythm, normal heart sounds and intact distal pulses.   Pulmonary/Chest: Effort normal and breath sounds normal.   Abdominal: Soft. Bowel sounds are normal.   Musculoskeletal: Normal range of motion.   Neurological: She is alert and oriented to person, place, and time. She has normal reflexes.   Skin: Skin  is warm and dry.       Vitals:    04/22/20 0853   BP: (!) 171/96   Pulse: 83   Temp: 98 °F (36.7 °C)       Assessment:         No diagnosis found.         Wound 02/01/20 1353 Ulceration midline Abdomen #1 (Active)   02/01/20 1353    Pre-existing: Yes   Primary Wound Type: Ulceration   Side:    Orientation: midline   Location: Abdomen   Wound/PI Number (optional): #1   Ankle-Brachial Index:    Pulses:    Removal Indication and Assessment:    Wound Outcome:    (Retired) Wound Type:    (Retired) Wound Length (cm):    (Retired) Wound Width (cm):    (Retired) Depth (cm):    Wound Description (Comments):    Removal Indications:    Wound Image   4/22/2020  9:59 AM   Dressing Appearance Moist drainage 4/22/2020  9:59 AM   Drainage Amount Moderate 4/22/2020  9:59 AM   Drainage Characteristics/Odor Sanguineous 4/22/2020  9:59 AM   Appearance Pink;Red 4/22/2020  9:59 AM   Tissue loss description Full thickness 4/22/2020  9:59 AM   Red (%), Wound Tissue Color 95 % 4/22/2020  9:59 AM   Yellow (%), Wound Tissue Color 5 % 4/22/2020  9:59 AM   Periwound Area Dry 4/22/2020  9:59 AM   Wound Edges Open 4/22/2020  9:59 AM   Wound Length (cm) 1.5 cm 4/22/2020  9:59 AM   Wound Width (cm) 0.5 cm 4/22/2020  9:59 AM   Wound Depth (cm) 3.8 cm 4/22/2020  9:59 AM   Wound Volume (cm^3) 2.85 cm^3 4/22/2020  9:59 AM   Wound Surface Area (cm^2) 0.75 cm^2 4/22/2020  9:59 AM   Care Cleansed with:;Sterile normal saline 4/22/2020  9:59 AM   Dressing Calcium alginate;Abd pad 4/22/2020  9:59 AM   Packing Incision packing removed 4/22/2020  9:59 AM   Periwound Care Skin barrier film applied 4/22/2020  9:59 AM   Dressing Change Due 04/29/20 4/22/2020  9:59 AM            Wound 03/04/20 1023 Abdomen #2 (Active)   03/04/20 1023    Pre-existing:    Primary Wound Type:    Side: Left   Orientation:    Location: Abdomen   Wound/PI Number (optional): #2   Ankle-Brachial Index:    Pulses:    Removal Indication and Assessment:    Wound Outcome:    (Retired) Wound  Type:    (Retired) Wound Length (cm):    (Retired) Wound Width (cm):    (Retired) Depth (cm):    Wound Description (Comments):    Removal Indications:    Wound Image   4/22/2020  9:59 AM   Dressing Appearance Moist drainage 4/22/2020  9:59 AM   Drainage Amount Large 4/22/2020  9:59 AM   Drainage Characteristics/Odor Sanguineous 4/22/2020  9:59 AM   Appearance Red;Yellow 4/22/2020  9:59 AM   Tissue loss description Full thickness 4/22/2020  9:59 AM   Red (%), Wound Tissue Color 95 % 4/22/2020  9:59 AM   Yellow (%), Wound Tissue Color 5 % 4/22/2020  9:59 AM   Wound Edges Open 4/22/2020  9:59 AM   Wound Length (cm) 0.7 cm 4/22/2020  9:59 AM   Wound Width (cm) 1 cm 4/22/2020  9:59 AM   Wound Depth (cm) 5 cm 4/22/2020  9:59 AM   Wound Volume (cm^3) 3.5 cm^3 4/22/2020  9:59 AM   Wound Surface Area (cm^2) 0.7 cm^2 4/22/2020  9:59 AM   Care Cleansed with: 4/22/2020  9:59 AM   Dressing Abd pad 4/22/2020  9:59 AM   Packing other (see comments);Incision packed with 4/22/2020  9:59 AM   Periwound Care Moisture barrier applied 4/22/2020  9:59 AM   Dressing Change Due 04/29/20 4/22/2020  9:59 AM         Abdominal wound midline   Clean wound with normal saline  Lidocaine 2% gel or 4 % Topical solution: PRN in clinic  Danielle wound: Cavilon, betamethasone PRN itching  Primary dressing: Apply gentamycin to plain nu gauze and lightly pack into wound bed   Secondary dressing: Cover with aquacel extra, 4 x 4 gauze, small ABD, and secure with mefix tape      Left Upper Quadrant abdomen   Cleanse with normal saline  Lidocaine 2% gel or 4 % Topical solution: PRN in clinic  Periwound: Cavilon, betamethasone PRN itching  Primary dressing: Apply gentamicin ointment to plain packing and gently pack to wound depth  Secondary dressing: Cover with aquacel extra, 4x4 gauze, small abd pad, and secure with mefix tape     Frequency: Daily per patient    Wound care supplies for home(daily) dressing changes    4 X 4 gauze   aquacel extra  Normal  saline  5X9 ABD pad  Tape      Plan:            Patient to return to clinic in 1 week

## 2020-04-29 ENCOUNTER — HOSPITAL ENCOUNTER (OUTPATIENT)
Dept: WOUND CARE | Facility: HOSPITAL | Age: 70
Discharge: HOME | End: 2020-04-29
Attending: SURGERY
Payer: MEDICARE

## 2020-04-29 VITALS
HEIGHT: 65 IN | WEIGHT: 157 LBS | TEMPERATURE: 99 F | HEART RATE: 81 BPM | SYSTOLIC BLOOD PRESSURE: 152 MMHG | DIASTOLIC BLOOD PRESSURE: 81 MMHG | BODY MASS INDEX: 26.16 KG/M2

## 2020-04-29 DIAGNOSIS — E11.9 TYPE 2 DIABETES MELLITUS WITHOUT COMPLICATION, WITHOUT LONG-TERM CURRENT USE OF INSULIN: ICD-10-CM

## 2020-04-29 DIAGNOSIS — T14.8XXA DRAINAGE FROM WOUND: ICD-10-CM

## 2020-04-29 DIAGNOSIS — L02.211 ABDOMINAL WALL ABSCESS: Primary | ICD-10-CM

## 2020-04-29 DIAGNOSIS — R10.32 LLQ PAIN: ICD-10-CM

## 2020-04-29 DIAGNOSIS — L08.9 LOCAL INFECTION OF SKIN AND SUBCUTANEOUS TISSUE: ICD-10-CM

## 2020-04-29 DIAGNOSIS — I10 ESSENTIAL HYPERTENSION: ICD-10-CM

## 2020-04-29 PROCEDURE — 99213 OFFICE O/P EST LOW 20 MIN: CPT

## 2020-04-29 NOTE — PROGRESS NOTES
"Subjective:       Patient ID: Azucena Morrison is a 69 y.o. female.    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 Pt denies any fevers or chills but states she feels nauseous at times. Returned to [place] June 28, from [place], reports much trouble with abdominal wound while in [place].  7/6/17-- Patient scheduled for surgical drainage of wound Tuesday 7/11/17DB  7/19/17-- Patient post op clinic visit.  8/16/17: CT of abdomen and pelvis done 8/9/17 due to suspected fistula  8/23/17-- U/S of abdomen done today.  12/13/17 Wound vac with 20cc drainage in clinic today.  1/10/18: on PO abx  1/24/18: Fistulagram ordered per Dr. Horton. Patient still on PO abx  02/21/18 Patient is not on antibiotics at this time. BS fasting 135 per patient report this am.  03/07/18 Culture of Anterior Abdominal Wound is positive. No antibiotics at this time.  fasting per patient report.    03/21/18 Patient c/o nausea along with abdominal tenderness near abd midline wound. Patient states that pain level is 6 and that she passed two sero-sanguineous clots from wound. Patient is afebrile this am.  3/28/18: patient continue antibiotics and reports that pain is less than last weeks clinic visit. Drainage has decreased as well  04/04/18 Patient states that abdominal pain is "pressure" sensation and that when she pushes down on her abdomen on either side of abdominal wound she feels like she has to urinate. Patient reports pain level of 3 this am.  fasting this am per patient report.   5/2/18: Patient had Abdomina Toribio today before wound care. She had discontinued Bactrim PO per Dr. Horton's recommendation and culture results and is now taking Amoxicillin PO  6/20/18: Pt reports itching to wound and periwound   6/27/18: patient reports no new complaints with regards to her wound or abdomen, wound continues to decrease in size " and drainage  8/1/18: Pt reports increased pain to abdomen with nausea and emesis since thursday 7/26/18, denies any fevers, patient did no go to ER as instructed by home health nurse on Sat. 7/28/18.  8/29/18: Patient admitted to hospital 8/2 for abdominal wound complications. Surgery for abdominal abscess per Dr. Horton on 8/3. Patient placed on IV antibiotics and discharged home on 8/18 with [facility]  and PICC line to E. Patient currently on IV ertapenem. IV medication sent to patients home per [facility].   9/5/18: IV Ertapenem to continue 1 week. Culture sent to lab. [facility]  sent orders. PICC line intact to [facility].  9/12/18 Antibiotics completed. Culture results pending. 1 deep stitch remaining.  9/12/18: Culture positive for E. Coli, patient to continue Ertapenem IV antibiotics x 2weeks. [facility] notified  9/26/18: F/U abdominal wound, wound continues to improve. Patient will complete IV antibiotics today  10/3/18: patient c/o diarrhea for 2 days and nausea with some vomiting. Labs and stool culture ordered. IV antibiotics discontinued today  10/10/2018 patient will start on IV antibiotic Invaz IV once a day, pending PICC line placement, order placed today  for PICC per Dr. Horton.  10/24/18: patient on IV antibiotics, tolerating well. Continue for 1 week  11/7/2018: IV Antibiotic discontinued.  11/8/2018: PICC line to left upper arm discontinued by Home Health.  11/21/2018 F/u for abdominal wall fistula , c/o nausea  No vomiting , no fever  12/5/2018: F/u for abdominal wall fisula, c/o gas, Dr. Horton will order Bentyl. Surgery schedule for January 2019.  12/12/2018: F/u for abdominal wall fistula, c/o abdominal pain. Dr. oHrton ordered Norco 5/325mg tab 1 PO every 4 hours as needed for pain and referred patient for x-ray of abdomen after clinic today.  12/19/2018: F/u for abdominal wall fisula, c/o nausea, Dr. Horton re ordered Ondansetron (Zofran) 8mg one tab by mouth every eight  hours as needed for nausea. No changes in wound condition from last visit noted in clinic today.  12/26/2018: F/u abdominal wall fistula drainage, no c/o at this time. Denies any abdominal pain or nausea. Dr. Horton recommends surgery first week of January 2019 for abdominal fistula treatment and possible colostomy placement, patient agree.  01/02/2019: F/u abdominal wall fistula. Dressing with large amount of drainage, malodorous, Dr. Horton is scheduling surgery for third week of January, 2019.  1/16/19: F/U Dr. Horton today in clinic, surgery scheduled for next Friday 1/25/19 with Dr. Horton.     01/23/2019: Presents to wound care clinic for f/u abdominal wound care tx. Surgery scheduled with Dr. Horton for Friday 01/25/2019. Dr. Horton explained Mrs. Morrison surgery procedure including possible complications, Nurse  (Bots[place]an) present, patient verbalizes understanding of procedure including possible complications, all questions from patient were answered by Dr. Horton including blood sugar and blood pressure medications per patient's concerns. Consent for surgery and blood transfusion signed by patient, Dr. Horton and nurse witnessed.  Abdominal wound cultures collected per Dr. Horton, cultures sent to lab/micro pending results. Dr. Horton prescribed the following orders: fleet enema for Thursday 1/24/2019, clear liquid diet and not to eat after midnight all for 1/24/2019. Start taking Neomycin and erythromycin by mouth three times a day (1/24/2019) and dulcolax one tab by mouth twice a day, Mrs. Morrison voices understanding.     01/30/2019: F/U wound care treatment with Dr. Horton. Per pt, primary physician Dr. Pj Monae ordered for her to take potassium pills for three days, last day today and scheduled for lab work at primary physician clinic on 1/31/2019. Per pt. Feeling better, no more abdominal pain (went to ER 1/24/2019 and discharged 1/25/2019 c/o abd pain.). Dr. Horton awaiting  "on primary clearance to re-schedule surgery, per pt. She will call wound care clinic and Dr. Horton to make aware of clearance day. Continue with same orders for dressing change for Dr. Horton. Mrs. Morrison requesting gentamicin refill.     02/06/2019: F/u with Dr. Horton for wound care treatment. Mrs. Morrison brought to clinic a clearance for surgery by Dr. Pj Sanches dated 02/06/2019 "Hyperkalemia-Resolved K 4.8 2/1/2019, labs , Cr. 1.05, H/H 10/30. No contraindication to surgery, tight control of BS, Hydration." A copy of EKG (1/24/2019) and lab work from [facility] collected 1/31/2019, reported results 2/1/2019. Dr. Horton scheduled surgery for 02/22/2019, consent were signed on 01/23/2019, all questions were answered by Dr. Horton, this nurse  ([place]) present. Education provided to patient on the importance of having a clear liquid diet the day before surgery 02/21/2019 as per Dr. Horton, new medications and preop preparation before surgery, verbalizes understanding. Dr. Horton ordered Norco 5/325 mg PO for pain, Dulcolax two tabs by mouth to take on 02/21/2019, Soap suds enema (SSE) on Thursday 02/21/2019, Golytely by mouth 2 liters on 2/21/2019, Erythromycin 500 mg one tab by mouth three times a day and Neomycin 1 gm by mouth three times a day.     2/13/2019: Presents to wound care clinic for a f/u with Dr. Horton for wound care treatment. No new orders in wound dressing change, reviewed preop orders with Mrs. Morrison per Dr. Horton, all questions answered. Surgery scheduled for 02/22/2019.  2/20/19: Continues wound care a previously ordered.  Depth measured per Dr. Horton 8cm.  Preop orders reviewed with patient who verbalized understanding.  Surgery scheduled for Fri 2/22/19.  Rx given to patient for Norco and Zofran.    03/06/19: Patient admitted to [facility] on 02/22/19 for exploratory laparotomy of abdomen per Dr. Horton. Patient discharged on 02/28/19 with " "home health and PICC line with IV antibiotics. Staples removed today in clinic. Patient denies any fever, chills, n&v; however, she states that she has "low energy." Continued with wound care as ordered.     3/13/2019: F/U with Dr. Horton for wound care treatment. Continue with same wound dressing change orders as per Dr. Horton, orders followed. Home Health to continue wound dressing change and lab draw for CBC weekly; continue IV Ertapenem/Invaz 1 gm IV daily as ordered.     3/20/2019: Clinic visit with Dr. Horton for abdominal abscess treatment. Presents with moderate draining from abdominal wound; wound size decreasing per measurement. Per Dr. Horton, apply one-piece system ostomy bag to abdominal wound for drain collection, may drain bag when it fills and may change every other day, continue applying gentamicin to wound bed before applying ostomy bag. Continue IV Invaz until completion. If ostomy bag not effective for draining collection, may return to previous orders for wound dressing change. Orders followed. Education provided to Mrs. Morrison on handwashing and ostomy care techniques, voices and demonstrates understanding.   3/27/19: Follow up with Dr. Horton today in clinic for abdominal abscess, moderated tanish yellow drainge present on dressing.  Wound slowly improving, patient refused one-piece ostomy bag stated " no it's too messy." requested to return to previous dressing prior to ostomy bag placement- iodoform gauze, aquacel extra, sm abd, and mefix tape.  Silver nitrate x1 per Dr. Horton today in clinic. Rx given to patient for PO Zofran.     04/03/2019: F/u clinic visit with Dr. Horton. Abdominal wound improving in size, picture on file. Wound cultures from abdominal abscess collected and sent to lab/micro, pending results. Mrs. Morrsion states going out of the country ([place]) at the end of April and plans to stay there for approximately two months. Dr. Horton ordered IV antibiotic for " two more weeks and new wound care dressing, orders followed.     4/9/2019: Clinic visit with Dr. Horton.  Per Dr. Horton, wound deep measurement increased, draining present. Wound cultures from abdominal wound taken and sent to lab/micro, pending results. C/o abdominal pain and itching around wound, Rx for betamethasone cream 01.% and Norco 5/325mg give in clinic by Dr. Horton. Wound care dressing orders followed. Continue IV antibiotic as previously ordered.  04/17/19: F/u for non-healing wound to abdomen. Culture report negative. Dr. Horton ordered 1 more week of IV antibiotics. Continue with topical wound care as ordered.     4/24/2019: Clinic visit with Dr. Horton for abdominal wound treatment. Wound improvement present, pictures on file. Last IV antibiotic today 4/24/2019, [facility] home [facility] to discontinue PICC line from left upper arm on 4/25/2019. Per Lyubov Prince, going to [place] on Friday 4/26/2019 and out of the country ([place]) on Monday 4/29/2019. This nurse spoke to Shaunna at [employer] (IV antibiotic delivery company) and informed last dose of antibiotic is today. Dr. Horton ordered Hydrocodone-acetaminophen (Norco) 5-325 mg (32 tabs), Bentyl 10 mg (120 capsules) and Gentamycin ointment. Education provided on the importance of eating food that contains iron (to prevent anemia) as well as eating meat and taking her blood pressure medications (blood pressure medication) on time, voices understanding. All questions answered by Dr. Horton. [facility] outpatient confirmed Betamethasone cream ordered on 4/10/2019 is ready for . Instructions and education provided to Mrs. Morrison on abdominal wound dressing changes, hand washing techniques and medication use, effects and side effects, voices and demonstrates understanding. Discharged home on stable conditions, [facility] will give wound care clinic a call when she is back from [place] in few months from now.     7/17/19: Readmit today to  wound care clinic.  Patient was recently out of the country visiting family in [place].  Patient complains of pain and nausea as on prior visits.  Dr. Horton seen patient today discussed with patient again placing colostomy patient refused.  Culture of wound taken, Dr. Horton restarted patient on zofran po for nausea, and Norco for pain. [facility] restarted today in clinic on Mondays and Fridays and prn. Orders given to gently pack wound with aquacel ag rope, cover with aquacel extra, 4x4 gauze, small abd pad and mefix tape change dressing ever other day by [facility] and Prn per patient.     7/24/2019: Clinic visit with Dr. Horton. Continue with dressing change as ordered. Wound cultures reviewed with patient, lab work ordered by Dr. Horton, no fever present or reported at this time. C/o decrease appetite, medication periactin prescribed. Requesting needles for insulin pen, order prescribed by Dr. Horton.      7/31/2019: F/U clinic visit with Dr. Horton. C/o of excruciated back pain 10/10, not feeling well, lot of gas and left upper quadrant discomfort. New orders prescribed: Augmentin 875-125 mg by mouth twice a day. Bentyl 10 mg one tab by mouth 4 times a day. Tramadol 50 mg one tab by mouth every 6 hours as needed for pain. Abdomen ultrasound. Wound care dressing completed as ordered.     8/7/2019: Clinic visit with Dr. Horton, denies any pain at this time. Wound care dressing done as ordered, no changes in wound size from last visit noted. Continue taking oral antiiotic as ordered, pending abdominal ultrasound, scheduled for 8/13/2019.     8/14/2019: F/U clinic visit with Dr. Horton. Admitted and discharged from emergency department this morning with abdominal pain, CT and ultrasound in filed. Dr. Horton performed a small drainage from the abdominal wound, moderate amount of creamy, serosanguineous fluid from abdominal wound present. Iodoform gauze packed into her wound, dressing changed  as ordered. Wound cultures collected and sent to lab/micro, pending results. Rx for Norco 5/325 mg one tab PO every 4 hours PRN as needed # 24 given to pt by Dr. Horton.      8/21/2019: Clinic visit with Dr. Horton. Wound dressing changed as ordered.      11/6/19: Follow up appt with Dr Horotn. Wounds improving slowly. Continues to take antibiotics (Augmentin 875-125mg). No complaints voiced. Follow up in 1 week.  11/13/19:  F/U clinic visit with Dr. Horton.  Wound depth per Dr. Horton is 8.5 cm.  Continue PT Augmentin.  Patient is co left sided abdominal pain.  Abdominal US and labs ordered.  Wound care tolerated well.  Fu with Dr. Horton in 1 week.      11/20/19:Dr Horton assessed patient. Silver nitrate applied to wound. Continuing present plan of care. Patient is scheduled to have abdominal ultra sound Friday 11/22/19. Continues to take Augmentin.  F/U in 1 week.    11/27/19: Follow up in clinic with Dr. Horton. Wound depth 8.5cm, silver nitrate x 3 per Dr. Horton, patient continues taking po Augmentin, c/o left sided abdominal pain, ultrasound results discussed with patient, Dr. Horton will watch for now.  Follow up 1 week 12/4/19.      12/04/19: F/u Dr. Horton. Continue with current wound care treatment. Rx given for Bentyl 10 mg. Patient to follow up in  1 week at wound clinic  12/11/19: F/u with . Continue with current wound care treatment. Rx given for Lotrisone cream to be applied daily per patient to right lower leg rash. Patient to follow up in  1 week at wound clinic     12/18/19: F/u with Dr. Horton. Patient c/o pain 9 out of 10  to abdomen LLQ. Area of pain is warm to touch, pink, and a small nodule can be felt. Patient states that symptoms began about 4 days ago. Stat CT scan ordered. Patient will have CT done tomorrow due to not fasting this morning. Instructed patient to fast before CT scan. Continue with current wound care treatment      01/08/2020: F/u with Dr. Horton.  Patient had I&D on 12/22/19 for abscess to abdomen. New wound care orders given. Patient continues on po abx and home health for wound care. Follow up in 1 week at wound care clinic     01/15/20: F/u with Dr. Horton. Patient c/o mild discomfort at wound to left abdomen. Patient denies any fever, chills, n&v, diarrhea, and/or constipation. Will continue to monitor. Continue with current wound care treatment and follow up in  Wound clinic in 1 week.  1/29/2020: Fu today in wound care clinic with .  Wounds improving slowly.  Silver nitrate x 1 to each wound per Dr. Horton patient tolerated well.  RX give to patient today for Amoxicillin po, Bentyl Po and Norco 5/325mg Po today in clinic.  Fu 1 week 2/5/2020 with Dr. Horton.  2/5/20: F/U with Dr. Horton. LUQ site resolved. Midline site same. Cont. POC.  2/12/20: F/U with Dr. Horton. LUQ site and midline probed by Dr. Horton. Cont. Augmentin. Rx for Norco provided. Cont. POC.  2/19/20: F/U with Dr. Horton. Afebrile. No s/s of infection. Culture done of midline today. Rx for Augmentin and Zofran sent electronically to pharmacy. Return in 1 week.  2/26/2020: FU MD visit. Cultures reviewed per MD, po abx changed from augmentin to tetracycline sent to pharmacy, patient verbalized understanding. Patient states she will be traveling to [place] on March 29 for 3 months.      03/04/2020: F/u with Dr. Horton. Patient c/o pain at wound site located on left abdomen, lethargy, and no appetite for the past 3 days . Culture taken and patient instructed to stop po tetracycline while awaiting culture results. New wound care orders given for left abdominal wound. Orders routed to home health. Patient to follow up in 1 week at wound clinic.  3/11/20: F/U with Dr. Horton. No c/o for pain today. Stop tetracycline. Rx for Augmentin sent to pharmacy. Cont. POC. Return in 1 week.  orders routed.  3/18/20: F/U with Dr. Horton. No c/o of pain at this time. Rx for Bentyl  and Norco provided. Continue Augmentin. Return in 1 week.  orders routed.     04/01/2020: F/u with Dr. Horton. Wound showing improvement. Patient states that she has not had home health nurse come out in 2 weeks. Patient also states that she would like us to order supplies so she can begin doing her own wound care. Supplies ordered. Patient instructed on daily wound care treatment and verbalized understanding. Continue po Augmentin BID  04/08/2020: F/u with Dr. Horton. Wound improving. Continue with current wound care treatment. Patient given rx for norco 5/325 mg and zofran 8 mg. No new complaints per patient today   04/22/20  F/U with Dr. Horton.  Wound unchanged.  Pack wounds with plain nu gauze with gentamicin ointment.  Patient given RX for Norco 5/325 mg, Zofran 8 mg, Amoxiciilin .  Patient to follow  Up with Dr. Horton in 1 week.  Wound care supplies ordered.  Review of Systems   04/29/2020: F/u with Dr. Horton. Wound probed with cotton-tip applicator per Dr. Horton to assist with fluid drainage. Patient tolerated well. Continue with current wound care treatment and follow up in 1 week at wound clinic    Review of Systems   Constitutional: Negative.    HENT: Negative.    Eyes: Negative.    Respiratory: Negative.    Cardiovascular: Negative.    Gastrointestinal: Negative.    Genitourinary: Negative.    Musculoskeletal: Negative.    Skin: Negative.    Neurological: Negative.    Psychiatric/Behavioral: Negative.        Objective:      Physical Exam   Constitutional: She is oriented to person, place, and time. She appears well-developed and well-nourished.   HENT:   Head: Normocephalic.   Eyes: Pupils are equal, round, and reactive to light. Conjunctivae and EOM are normal.   Neck: Normal range of motion. Neck supple.   Cardiovascular: Normal rate, regular rhythm, normal heart sounds and intact distal pulses.   Pulmonary/Chest: Effort normal and breath sounds normal.   Abdominal: Soft. Bowel sounds are  normal.   Musculoskeletal: Normal range of motion.   Neurological: She is alert and oriented to person, place, and time. She has normal reflexes.   Skin: Skin is warm and dry.       Assessment:       1. Abdominal wall abscess    2. Type 2 diabetes mellitus without complication, without long-term current use of insulin           Wound 02/01/20 1353 Ulceration midline Abdomen #1 (Active)   02/01/20 1353    Pre-existing: Yes   Primary Wound Type: Ulceration   Side:    Orientation: midline   Location: Abdomen   Wound/PI Number (optional): #1   Ankle-Brachial Index:    Pulses:    Removal Indication and Assessment:    Wound Outcome:    (Retired) Wound Type:    (Retired) Wound Length (cm):    (Retired) Wound Width (cm):    (Retired) Depth (cm):    Wound Description (Comments):    Removal Indications:    Dressing Appearance Intact 4/29/2020  8:00 AM   Drainage Amount Moderate 4/29/2020  8:00 AM   Drainage Characteristics/Odor Yellow 4/29/2020  8:00 AM   Appearance Pink;Moist 4/29/2020  8:00 AM   Tissue loss description Full thickness 4/29/2020  8:00 AM   Red (%), Wound Tissue Color 100 % 4/29/2020  8:00 AM   Periwound Area Dry;[place] 4/29/2020  8:00 AM   Wound Edges Open 4/29/2020  8:00 AM   Wound Length (cm) 0.2 cm 4/29/2020  8:00 AM   Wound Width (cm) 0.2 cm 4/29/2020  8:00 AM   Wound Depth (cm) 8.5 cm 4/29/2020  8:00 AM   Wound Volume (cm^3) 0.34 cm^3 4/29/2020  8:00 AM   Wound Surface Area (cm^2) 0.04 cm^2 4/29/2020  8:00 AM   Care Cleansed with:;Sterile normal saline 4/29/2020  8:00 AM   Dressing Applied;Calcium alginate;Gauze;Abd pad;Other (see comments) 4/29/2020  8:00 AM   Packing Incision packed with 4/29/2020  8:00 AM   Periwound Care Absorptive dressing applied 4/29/2020  8:00 AM   Dressing Change Due 04/30/20 4/29/2020  8:00 AM            Wound 03/04/20 1023 Abdomen #2 (Active)   03/04/20 1023    Pre-existing:    Primary Wound Type:    Side: Left   Orientation:    Location: Abdomen   Wound/PI Number (optional): #2    Ankle-Brachial Index:    Pulses:    Removal Indication and Assessment:    Wound Outcome:    (Retired) Wound Type:    (Retired) Wound Length (cm):    (Retired) Wound Width (cm):    (Retired) Depth (cm):    Wound Description (Comments):    Removal Indications:    Dressing Appearance Moist drainage 4/29/2020  8:00 AM   Drainage Amount Moderate 4/29/2020  8:00 AM   Drainage Characteristics/Odor Serosanguineous 4/29/2020  8:00 AM   Appearance Pink;Red;Moist 4/29/2020  8:00 AM   Tissue loss description Full thickness 4/29/2020  8:00 AM   Red (%), Wound Tissue Color 100 % 4/29/2020  8:00 AM   Periwound Area Intact 4/29/2020  8:00 AM   Wound Edges Irregular 4/29/2020  8:00 AM   Wound Length (cm) 0.5 cm 4/29/2020  8:00 AM   Wound Width (cm) 1 cm 4/29/2020  8:00 AM   Wound Depth (cm) 6.5 cm 4/29/2020  8:00 AM   Wound Volume (cm^3) 3.25 cm^3 4/29/2020  8:00 AM   Wound Surface Area (cm^2) 0.5 cm^2 4/29/2020  8:00 AM   Care Cleansed with:;Sterile normal saline 4/29/2020  8:00 AM   Dressing Applied;Calcium alginate;Gauze;Abd pad;Other (see comments) 4/29/2020  8:00 AM   Packing Incision packed with 4/29/2020  8:00 AM   Periwound Care Absorptive dressing applied 4/29/2020  8:00 AM   Dressing Change Due 04/30/20 4/29/2020  8:00 AM         Abdominal wound midline   Clean wound with normal saline  Lidocaine 2% gel or 4 % Topical solution: PRN in clinic  Danielle wound: Cavilon, betamethasone PRN itching  Primary dressing: Apply gentamycin to plain nu gauze and lightly pack into wound bed   Secondary dressing: Cover with aquacel extra, 4 x 4 gauze, small ABD, and secure with mefix tape      Left Upper Quadrant abdomen   Cleanse with normal saline  Lidocaine 2% gel or 4 % Topical solution: PRN in clinic  Periwound: Cavilon, betamethasone PRN itching  Primary dressing: Apply gentamicin ointment to plain packing and gently pack to wound depth  Secondary dressing: Cover with aquacel extra, 4x4 gauze, small abd pad, and secure with mefix  tape     Frequency: Daily per patient    Plan:                Follow up in about 1 week (around 5/6/2020) for Dr. Horton.

## 2020-05-04 ENCOUNTER — HOSPITAL ENCOUNTER (OUTPATIENT)
Dept: RADIOLOGY | Facility: HOSPITAL | Age: 70
Discharge: HOME | End: 2020-05-04
Attending: INTERNAL MEDICINE
Payer: MEDICARE

## 2020-05-04 DIAGNOSIS — R10.31 RLQ ABDOMINAL PAIN: ICD-10-CM

## 2020-05-04 PROCEDURE — 74176 CT ABD & PELVIS W/O CONTRAST: CPT | Mod: 26,,, | Performed by: RADIOLOGY

## 2020-05-04 PROCEDURE — 74176 CT ABDOMEN PELVIS WITHOUT CONTRAST: ICD-10-PCS | Mod: 26,,, | Performed by: RADIOLOGY

## 2020-05-04 PROCEDURE — 74176 CT ABD & PELVIS W/O CONTRAST: CPT | Mod: TC

## 2020-05-06 ENCOUNTER — HOSPITAL ENCOUNTER (OUTPATIENT)
Dept: WOUND CARE | Facility: HOSPITAL | Age: 70
Discharge: HOME | End: 2020-05-06
Attending: SURGERY
Payer: MEDICARE

## 2020-05-06 VITALS
WEIGHT: 157 LBS | SYSTOLIC BLOOD PRESSURE: 135 MMHG | HEART RATE: 72 BPM | DIASTOLIC BLOOD PRESSURE: 81 MMHG | HEIGHT: 65 IN | TEMPERATURE: 96 F | BODY MASS INDEX: 26.16 KG/M2

## 2020-05-06 DIAGNOSIS — E11.9 TYPE 2 DIABETES MELLITUS WITHOUT COMPLICATION, WITHOUT LONG-TERM CURRENT USE OF INSULIN: ICD-10-CM

## 2020-05-06 DIAGNOSIS — E11.620 TYPE 2 DIABETES MELLITUS WITH DIABETIC DERMATITIS, WITHOUT LONG-TERM CURRENT USE OF INSULIN: ICD-10-CM

## 2020-05-06 DIAGNOSIS — K63.2 COLOCUTANEOUS FISTULA: ICD-10-CM

## 2020-05-06 DIAGNOSIS — T81.43XA POSTPROCEDURAL INTRAABDOMINAL ABSCESS: ICD-10-CM

## 2020-05-06 DIAGNOSIS — R10.32 LLQ PAIN: ICD-10-CM

## 2020-05-06 DIAGNOSIS — T14.8XXA DRAINAGE FROM WOUND: ICD-10-CM

## 2020-05-06 DIAGNOSIS — L02.211 ABDOMINAL WALL ABSCESS: Primary | ICD-10-CM

## 2020-05-06 DIAGNOSIS — I10 ESSENTIAL HYPERTENSION: ICD-10-CM

## 2020-05-06 PROCEDURE — 99213 OFFICE O/P EST LOW 20 MIN: CPT

## 2020-05-06 NOTE — PROGRESS NOTES
"Subjective:       Patient ID: Azucena Morrison is a 69 y.o. female.    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 Pt denies any fevers or chills but states she feels nauseous at times. Returned to [place] June 28, from [place], reports much trouble with abdominal wound while in [place].  7/6/17-- Patient scheduled for surgical drainage of wound Tuesday 7/11/17DB  7/19/17-- Patient post op clinic visit.  8/16/17: CT of abdomen and pelvis done 8/9/17 due to suspected fistula  8/23/17-- U/S of abdomen done today.  12/13/17 Wound vac with 20cc drainage in clinic today.  1/10/18: on PO abx  1/24/18: Fistulagram ordered per Dr. Horton. Patient still on PO abx  02/21/18 Patient is not on antibiotics at this time. BS fasting 135 per patient report this am.  03/07/18 Culture of Anterior Abdominal Wound is positive. No antibiotics at this time.  fasting per patient report.    03/21/18 Patient c/o nausea along with abdominal tenderness near abd midline wound. Patient states that pain level is 6 and that she passed two sero-sanguineous clots from wound. Patient is afebrile this am.  3/28/18: patient continue antibiotics and reports that pain is less than last weeks clinic visit. Drainage has decreased as well  04/04/18 Patient states that abdominal pain is "pressure" sensation and that when she pushes down on her abdomen on either side of abdominal wound she feels like she has to urinate. Patient reports pain level of 3 this am.  fasting this am per patient report.   5/2/18: Patient had Abdomina Toribio today before wound care. She had discontinued Bactrim PO per Dr. Horton's recommendation and culture results and is now taking Amoxicillin PO  6/20/18: Pt reports itching to wound and periwound   6/27/18: patient reports no new complaints with regards to her wound or abdomen, wound continues to decrease in size " and drainage  8/1/18: Pt reports increased pain to abdomen with nausea and emesis since thursday 7/26/18, denies any fevers, patient did no go to ER as instructed by home health nurse on Sat. 7/28/18.  8/29/18: Patient admitted to hospital 8/2 for abdominal wound complications. Surgery for abdominal abscess per Dr. Horton on 8/3. Patient placed on IV antibiotics and discharged home on 8/18 with [facility]  and PICC line to E. Patient currently on IV ertapenem. IV medication sent to patients home per [facility].   9/5/18: IV Ertapenem to continue 1 week. Culture sent to lab. [facility]  sent orders. PICC line intact to [facility].  9/12/18 Antibiotics completed. Culture results pending. 1 deep stitch remaining.  9/12/18: Culture positive for E. Coli, patient to continue Ertapenem IV antibiotics x 2weeks. [facility] notified  9/26/18: F/U abdominal wound, wound continues to improve. Patient will complete IV antibiotics today  10/3/18: patient c/o diarrhea for 2 days and nausea with some vomiting. Labs and stool culture ordered. IV antibiotics discontinued today  10/10/2018 patient will start on IV antibiotic Invaz IV once a day, pending PICC line placement, order placed today  for PICC per Dr. Horton.  10/24/18: patient on IV antibiotics, tolerating well. Continue for 1 week  11/7/2018: IV Antibiotic discontinued.  11/8/2018: PICC line to left upper arm discontinued by Home Health.  11/21/2018 F/u for abdominal wall fistula , c/o nausea  No vomiting , no fever  12/5/2018: F/u for abdominal wall fisula, c/o gas, Dr. Horton will order Bentyl. Surgery schedule for January 2019.  12/12/2018: F/u for abdominal wall fistula, c/o abdominal pain. Dr. Horton ordered Norco 5/325mg tab 1 PO every 4 hours as needed for pain and referred patient for x-ray of abdomen after clinic today.  12/19/2018: F/u for abdominal wall fisula, c/o nausea, Dr. Horton re ordered Ondansetron (Zofran) 8mg one tab by mouth every eight  hours as needed for nausea. No changes in wound condition from last visit noted in clinic today.  12/26/2018: F/u abdominal wall fistula drainage, no c/o at this time. Denies any abdominal pain or nausea. Dr. Horton recommends surgery first week of January 2019 for abdominal fistula treatment and possible colostomy placement, patient agree.  01/02/2019: F/u abdominal wall fistula. Dressing with large amount of drainage, malodorous, Dr. Horton is scheduling surgery for third week of January, 2019.  1/16/19: F/U Dr. Horton today in clinic, surgery scheduled for next Friday 1/25/19 with Dr. Horton.     01/23/2019: Presents to wound care clinic for f/u abdominal wound care tx. Surgery scheduled with Dr. Horton for Friday 01/25/2019. Dr. Horton explained Mrs. Morrison surgery procedure including possible complications, Nurse  (Japanese) present, patient verbalizes understanding of procedure including possible complications, all questions from patient were answered by Dr. Horton including blood sugar and blood pressure medications per patient's concerns. Consent for surgery and blood transfusion signed by patient, Dr. Horton and nurse witnessed.  Abdominal wound cultures collected per Dr. Horton, cultures sent to lab/micro pending results. Dr. Horton prescribed the following orders: fleet enema for Thursday 1/24/2019, clear liquid diet and not to eat after midnight all for 1/24/2019. Start taking Neomycin and erythromycin by mouth three times a day (1/24/2019) and dulcolax one tab by mouth twice a day, Mrs. Morrison voices understanding.     01/30/2019: F/U wound care treatment with Dr. Horton. Per pt, primary physician Dr. Pj Monae ordered for her to take potassium pills for three days, last day today and scheduled for lab work at primary physician clinic on 1/31/2019. Per pt. Feeling better, no more abdominal pain (went to ER 1/24/2019 and discharged 1/25/2019 c/o abd pain.). Dr. Horton awaiting  "on primary clearance to re-schedule surgery, per pt. She will call wound care clinic and Dr. Horton to make aware of clearance day. Continue with same orders for dressing change for Dr. Horton. Mrs. Morrison requesting gentamicin refill.     02/06/2019: F/u with Dr. Horton for wound care treatment. Mrs. Morrison brought to clinic a clearance for surgery by Dr. Pj Sanches dated 02/06/2019 "Hyperkalemia-Resolved K 4.8 2/1/2019, labs , Cr. 1.05, H/H 10/30. No contraindication to surgery, tight control of BS, Hydration." A copy of EKG (1/24/2019) and lab work from [facility] collected 1/31/2019, reported results 2/1/2019. Dr. Horton scheduled surgery for 02/22/2019, consent were signed on 01/23/2019, all questions were answered by Dr. Horton, this nurse  (Belarusian) present. Education provided to patient on the importance of having a clear liquid diet the day before surgery 02/21/2019 as per Dr. Horton, new medications and preop preparation before surgery, verbalizes understanding. Dr. Horton ordered Norco 5/325 mg PO for pain, Dulcolax two tabs by mouth to take on 02/21/2019, Soap suds enema (SSE) on Thursday 02/21/2019, Golytely by mouth 2 liters on 2/21/2019, Erythromycin 500 mg one tab by mouth three times a day and Neomycin 1 gm by mouth three times a day.     2/13/2019: Presents to wound care clinic for a f/u with Dr. Horton for wound care treatment. No new orders in wound dressing change, reviewed preop orders with Mrs. Morrison per Dr. Horton, all questions answered. Surgery scheduled for 02/22/2019.  2/20/19: Continues wound care a previously ordered.  Depth measured per Dr. Horton 8cm.  Preop orders reviewed with patient who verbalized understanding.  Surgery scheduled for Fri 2/22/19.  Rx given to patient for Norco and Zofran.    03/06/19: Patient admitted to [facility] on 02/22/19 for exploratory laparotomy of abdomen per Dr. Horton. Patient discharged on 02/28/19 with " "home health and PICC line with IV antibiotics. Staples removed today in clinic. Patient denies any fever, chills, n&v; however, she states that she has "low energy." Continued with wound care as ordered.     3/13/2019: F/U with Dr. Horton for wound care treatment. Continue with same wound dressing change orders as per Dr. Horton, orders followed. Home Health to continue wound dressing change and lab draw for CBC weekly; continue IV Ertapenem/Invaz 1 gm IV daily as ordered.     3/20/2019: Clinic visit with Dr. Horton for abdominal abscess treatment. Presents with moderate draining from abdominal wound; wound size decreasing per measurement. Per Dr. Horton, apply one-piece system ostomy bag to abdominal wound for drain collection, may drain bag when it fills and may change every other day, continue applying gentamicin to wound bed before applying ostomy bag. Continue IV Invaz until completion. If ostomy bag not effective for draining collection, may return to previous orders for wound dressing change. Orders followed. Education provided to Mrs. Morrison on handwashing and ostomy care techniques, voices and demonstrates understanding.   3/27/19: Follow up with Dr. Horton today in clinic for abdominal abscess, moderated tanish yellow drainge present on dressing.  Wound slowly improving, patient refused one-piece ostomy bag stated " no it's too messy." requested to return to previous dressing prior to ostomy bag placement- iodoform gauze, aquacel extra, sm abd, and mefix tape.  Silver nitrate x1 per Dr. Horton today in clinic. Rx given to patient for PO Zofran.     04/03/2019: F/u clinic visit with Dr. Horton. Abdominal wound improving in size, picture on file. Wound cultures from abdominal abscess collected and sent to lab/micro, pending results. Mrs. Morrison states going out of the country ([place]) at the end of April and plans to stay there for approximately two months. Dr. Horton ordered IV antibiotic for " two more weeks and new wound care dressing, orders followed.     4/9/2019: Clinic visit with Dr. Horton.  Per Dr. Horton, wound deep measurement increased, draining present. Wound cultures from abdominal wound taken and sent to lab/micro, pending results. C/o abdominal pain and itching around wound, Rx for betamethasone cream 01.% and Norco 5/325mg give in clinic by Dr. Horton. Wound care dressing orders followed. Continue IV antibiotic as previously ordered.  04/17/19: F/u for non-healing wound to abdomen. Culture report negative. Dr. Horton ordered 1 more week of IV antibiotics. Continue with topical wound care as ordered.     4/24/2019: Clinic visit with Dr. Horton for abdominal wound treatment. Wound improvement present, pictures on file. Last IV antibiotic today 4/24/2019, [facility] home [facility] to discontinue PICC line from left upper arm on 4/25/2019. Per Lyubov Prince, going to [place] on Friday 4/26/2019 and out of the country ([place]) on Monday 4/29/2019. This nurse spoke to Shaunna at [employer] ([employer] antibiotic delivery company) and informed last dose of antibiotic is today. Dr. Horton ordered Hydrocodone-acetaminophen (Norco) 5-325 mg (32 tabs), Bentyl 10 mg (120 capsules) and Gentamycin ointment. Education provided on the importance of eating food that contains iron (to prevent anemia) as well as eating meat and taking her blood pressure medications (blood pressure medication) on time, voices understanding. All questions answered by Dr. Horton. [facility] outpatient confirmed Betamethasone cream ordered on 4/10/2019 is ready for . Instructions and education provided to Mrs. Morrison on abdominal wound dressing changes, hand washing techniques and medication use, effects and side effects, voices and demonstrates understanding. Discharged home on stable conditions, [facility] will give wound care clinic a call when she is back from [place] in few months from now.     7/17/19: Readmit today to  wound care clinic.  Patient was recently out of the country visiting family in [place].  Patient complains of pain and nausea as on prior visits.  Dr. Horton seen patient today discussed with patient again placing colostomy patient refused.  Culture of wound taken, Dr. Horton restarted patient on zofran po for nausea, and Norco for pain. [facility] restarted today in clinic on Mondays and Fridays and prn. Orders given to gently pack wound with aquacel ag rope, cover with aquacel extra, 4x4 gauze, small abd pad and mefix tape change dressing ever other day by [facility] and Prn per patient.     7/24/2019: Clinic visit with Dr. Horton. Continue with dressing change as ordered. Wound cultures reviewed with patient, lab work ordered by Dr. Horton, no fever present or reported at this time. C/o decrease appetite, medication periactin prescribed. Requesting needles for insulin pen, order prescribed by Dr. Horton.      7/31/2019: F/U clinic visit with Dr. Horton. C/o of excruciated back pain 10/10, not feeling well, lot of gas and left upper quadrant discomfort. New orders prescribed: Augmentin 875-125 mg by mouth twice a day. Bentyl 10 mg one tab by mouth 4 times a day. Tramadol 50 mg one tab by mouth every 6 hours as needed for pain. Abdomen ultrasound. Wound care dressing completed as ordered.     8/7/2019: Clinic visit with Dr. Horton, denies any pain at this time. Wound care dressing done as ordered, no changes in wound size from last visit noted. Continue taking oral antiiotic as ordered, pending abdominal ultrasound, scheduled for 8/13/2019.     8/14/2019: F/U clinic visit with Dr. Horton. Admitted and discharged from emergency department this morning with abdominal pain, CT and ultrasound in filed. Dr. Horton performed a small drainage from the abdominal wound, moderate amount of creamy, serosanguineous fluid from abdominal wound present. Iodoform gauze packed into her wound, dressing changed  as ordered. Wound cultures collected and sent to lab/micro, pending results. Rx for Norco 5/325 mg one tab PO every 4 hours PRN as needed # 24 given to pt by Dr. Horton.      8/21/2019: Clinic visit with Dr. Horton. Wound dressing changed as ordered.      11/6/19: Follow up appt with Dr Horton. Wounds improving slowly. Continues to take antibiotics (Augmentin 875-125mg). No complaints voiced. Follow up in 1 week.  11/13/19:  F/U clinic visit with Dr. Horton.  Wound depth per Dr. Horton is 8.5 cm.  Continue PT Augmentin.  Patient is co left sided abdominal pain.  Abdominal US and labs ordered.  Wound care tolerated well.  Fu with Dr. Horton in 1 week.      11/20/19:Dr Horton assessed patient. Silver nitrate applied to wound. Continuing present plan of care. Patient is scheduled to have abdominal ultra sound Friday 11/22/19. Continues to take Augmentin.  F/U in 1 week.    11/27/19: Follow up in clinic with Dr. Horton. Wound depth 8.5cm, silver nitrate x 3 per Dr. Horton, patient continues taking po Augmentin, c/o left sided abdominal pain, ultrasound results discussed with patient, Dr. Horton will watch for now.  Follow up 1 week 12/4/19.      12/04/19: F/u Dr. Horton. Continue with current wound care treatment. Rx given for Bentyl 10 mg. Patient to follow up in  1 week at wound clinic  12/11/19: F/u with . Continue with current wound care treatment. Rx given for Lotrisone cream to be applied daily per patient to right lower leg rash. Patient to follow up in  1 week at wound clinic     12/18/19: F/u with Dr. Horton. Patient c/o pain 9 out of 10  to abdomen LLQ. Area of pain is warm to touch, pink, and a small nodule can be felt. Patient states that symptoms began about 4 days ago. Stat CT scan ordered. Patient will have CT done tomorrow due to not fasting this morning. Instructed patient to fast before CT scan. Continue with current wound care treatment      01/08/2020: F/u with Dr. Horton.  Patient had I&D on 12/22/19 for abscess to abdomen. New wound care orders given. Patient continues on po abx and home health for wound care. Follow up in 1 week at wound care clinic     01/15/20: F/u with Dr. Horton. Patient c/o mild discomfort at wound to left abdomen. Patient denies any fever, chills, n&v, diarrhea, and/or constipation. Will continue to monitor. Continue with current wound care treatment and follow up in  Wound clinic in 1 week.  1/29/2020: Fu today in wound care clinic with .  Wounds improving slowly.  Silver nitrate x 1 to each wound per Dr. Horton patient tolerated well.  RX give to patient today for Amoxicillin po, Bentyl Po and Norco 5/325mg Po today in clinic.  Fu 1 week 2/5/2020 with Dr. Horton.  2/5/20: F/U with Dr. Horton. LUQ site resolved. Midline site same. Cont. POC.  2/12/20: F/U with Dr. Horton. LUQ site and midline probed by Dr. Horton. Cont. Augmentin. Rx for Norco provided. Cont. POC.  2/19/20: F/U with Dr. Horton. Afebrile. No s/s of infection. Culture done of midline today. Rx for Augmentin and Zofran sent electronically to pharmacy. Return in 1 week.  2/26/2020: FU MD visit. Cultures reviewed per MD, po abx changed from augmentin to tetracycline sent to pharmacy, patient verbalized understanding. Patient states she will be traveling to [place] on March 29 for 3 months.      03/04/2020: F/u with Dr. Horton. Patient c/o pain at wound site located on left abdomen, lethargy, and no appetite for the past 3 days . Culture taken and patient instructed to stop po tetracycline while awaiting culture results. New wound care orders given for left abdominal wound. Orders routed to home health. Patient to follow up in 1 week at wound clinic.  3/11/20: F/U with Dr. Horton. No c/o for pain today. Stop tetracycline. Rx for Augmentin sent to pharmacy. Cont. POC. Return in 1 week.  orders routed.  3/18/20: F/U with Dr. Horton. No c/o of pain at this time. Rx for Bentyl  and Norco provided. Continue Augmentin. Return in 1 week.  orders routed.     04/01/2020: F/u with Dr. Horton. Wound showing improvement. Patient states that she has not had home health nurse come out in 2 weeks. Patient also states that she would like us to order supplies so she can begin doing her own wound care. Supplies ordered. Patient instructed on daily wound care treatment and verbalized understanding. Continue po Augmentin BID  04/08/2020: F/u with Dr. Horton. Wound improving. Continue with current wound care treatment. Patient given rx for norco 5/325 mg and zofran 8 mg. No new complaints per patient today   04/22/20  F/U with Dr. Horton.  Wound unchanged.  Pack wounds with plain nu gauze with gentamicin ointment.  Patient given RX for Norco 5/325 mg, Zofran 8 mg, Amoxiciilin .  Patient to follow  Up with Dr. Horton in 1 week.  Wound care supplies ordered.  Review of Systems   04/29/2020: F/u with Dr. Horton. Wound probed with cotton-tip applicator per Dr. Horton to assist with fluid drainage. Patient tolerated well. Continue with current wound care treatment and follow up in 1 week at wound clinic  05/06/2020: F/u with Dr. Horton. Wound remains stable. Patient experiencing increased pain. CT scan of abdomen ordered by PCP. Patient got CT scan on Monday, and Dr. Horton reviewed results with patient. Continue current wound care treatment and follow up in 1 week    Review of Systems   Constitutional: Negative.    HENT: Negative.    Eyes: Negative.    Respiratory: Negative.    Cardiovascular: Negative.    Gastrointestinal: Negative.    Genitourinary: Negative.    Musculoskeletal: Negative.    Skin: Negative.    Neurological: Negative.    Psychiatric/Behavioral: Negative.        Objective:      Physical Exam   Constitutional: She is oriented to person, place, and time. She appears well-developed and well-nourished.   HENT:   Head: Normocephalic.   Eyes: Pupils are equal, round, and reactive to light.  Conjunctivae and EOM are normal.   Neck: Normal range of motion. Neck supple.   Cardiovascular: Normal rate, regular rhythm, normal heart sounds and intact distal pulses.   Pulmonary/Chest: Effort normal and breath sounds normal.   Abdominal: Soft. Bowel sounds are normal.   Musculoskeletal: Normal range of motion.   Neurological: She is alert and oriented to person, place, and time. She has normal reflexes.   Skin: Skin is warm and dry.       Assessment:       1. Abdominal wall abscess    2. Type 2 diabetes mellitus without complication, without long-term current use of insulin    3. LLQ pain    4. Drainage from wound           Wound 02/01/20 1353 Ulceration midline Abdomen #1 (Active)   02/01/20 1353    Pre-existing: Yes   Primary Wound Type: Ulceration   Side:    Orientation: midline   Location: Abdomen   Wound/PI Number (optional): #1   Ankle-Brachial Index:    Pulses:    Removal Indication and Assessment:    Wound Outcome:    (Retired) Wound Type:    (Retired) Wound Length (cm):    (Retired) Wound Width (cm):    (Retired) Depth (cm):    Wound Description (Comments):    Removal Indications:    Wound Image   5/6/2020  8:00 AM   Dressing Appearance Intact 5/6/2020  8:00 AM   Drainage Amount Moderate 5/6/2020  8:00 AM   Drainage Characteristics/Odor Creamy;Tan 5/6/2020  8:00 AM   Appearance Pink;Moist 5/6/2020  8:00 AM   Tissue loss description Full thickness 5/6/2020  8:00 AM   Red (%), Wound Tissue Color 100 % 5/6/2020  8:00 AM   Periwound Area Intact 5/6/2020  8:00 AM   Wound Edges Open 5/6/2020  8:00 AM   Wound Length (cm) 0.3 cm 5/6/2020  8:00 AM   Wound Width (cm) 0.2 cm 5/6/2020  8:00 AM   Wound Depth (cm) 8.5 cm 5/6/2020  8:00 AM   Wound Volume (cm^3) 0.51 cm^3 5/6/2020  8:00 AM   Wound Surface Area (cm^2) 0.06 cm^2 5/6/2020  8:00 AM   Care Cleansed with:;Sterile normal saline 5/6/2020  8:00 AM   Dressing Applied;Calcium alginate;Gauze;Abd pad;Other (see comments) 5/6/2020  8:00 AM   Periwound Care Skin  barrier film applied 5/6/2020  8:00 AM   Dressing Change Due 05/07/20 5/6/2020  8:00 AM            Wound 03/04/20 1023 Abdomen #2 (Active)   03/04/20 1023    Pre-existing:    Primary Wound Type:    Side: Left   Orientation:    Location: Abdomen   Wound/PI Number (optional): #2   Ankle-Brachial Index:    Pulses:    Removal Indication and Assessment:    Wound Outcome:    (Retired) Wound Type:    (Retired) Wound Length (cm):    (Retired) Wound Width (cm):    (Retired) Depth (cm):    Wound Description (Comments):    Removal Indications:    Wound Image   5/6/2020  8:00 AM   Dressing Appearance Intact 5/6/2020  8:00 AM   Drainage Amount Moderate 5/6/2020  8:00 AM   Drainage Characteristics/Odor Tan;Creamy 5/6/2020  8:00 AM   Appearance Pink;Red;Moist 5/6/2020  8:00 AM   Tissue loss description Full thickness 5/6/2020  8:00 AM   Red (%), Wound Tissue Color 100 % 5/6/2020  8:00 AM   Periwound Area Intact 5/6/2020  8:00 AM   Wound Edges Defined 5/6/2020  8:00 AM   Wound Length (cm) 0.3 cm 5/6/2020  8:00 AM   Wound Width (cm) 0.5 cm 5/6/2020  8:00 AM   Wound Depth (cm) 5.7 cm 5/6/2020  8:00 AM   Wound Volume (cm^3) 0.86 cm^3 5/6/2020  8:00 AM   Wound Surface Area (cm^2) 0.15 cm^2 5/6/2020  8:00 AM   Care Cleansed with:;Sterile normal saline 5/6/2020  8:00 AM   Dressing Applied;Other (see comments);Calcium alginate;Gauze;Abd pad 5/6/2020  8:00 AM   Periwound Care Skin barrier film applied 5/6/2020  8:00 AM   Dressing Change Due 05/07/20 5/6/2020  8:00 AM       Abdominal wound midline   Clean wound with normal saline  Lidocaine 2% gel or 4 % Topical solution: PRN in clinic  Danielle wound: Cavilon, betamethasone PRN itching  Primary dressing: Apply gentamycin to plain nu gauze and lightly pack into wound bed   Secondary dressing: Cover with aquacel extra, 4 x 4 gauze, small ABD, and secure with mefix tape      Left Upper Quadrant abdomen   Cleanse with normal saline  Lidocaine 2% gel or 4 % Topical solution: PRN in  clinic  Periwound: Cavilon, betamethasone PRN itching  Primary dressing: Apply gentamicin ointment to plain packing and gently pack to wound depth  Secondary dressing: Cover with aquacel extra, 4x4 gauze, small abd pad, and secure with mefix tape     Frequency: Daily per patient    Follow up in 1 week with Dr. Horton    Rx given to patient for Norco 5/325 mg       Plan:                Follow up in about 1 week (around 5/13/2020) for Dr. Horton.

## 2020-05-13 ENCOUNTER — HOSPITAL ENCOUNTER (OUTPATIENT)
Dept: WOUND CARE | Facility: HOSPITAL | Age: 70
Discharge: HOME | End: 2020-05-13
Attending: SURGERY
Payer: MEDICARE

## 2020-05-13 VITALS
BODY MASS INDEX: 26.16 KG/M2 | SYSTOLIC BLOOD PRESSURE: 152 MMHG | TEMPERATURE: 97 F | HEIGHT: 65 IN | HEART RATE: 77 BPM | DIASTOLIC BLOOD PRESSURE: 84 MMHG | WEIGHT: 157 LBS

## 2020-05-13 DIAGNOSIS — Z98.890 S/P EXPLORATORY LAPAROTOMY: ICD-10-CM

## 2020-05-13 DIAGNOSIS — E11.9 TYPE 2 DIABETES MELLITUS WITHOUT COMPLICATION, WITHOUT LONG-TERM CURRENT USE OF INSULIN: ICD-10-CM

## 2020-05-13 DIAGNOSIS — L02.211 ABDOMINAL WALL ABSCESS: Primary | ICD-10-CM

## 2020-05-13 DIAGNOSIS — L08.9 LOCAL INFECTION OF SKIN AND SUBCUTANEOUS TISSUE: ICD-10-CM

## 2020-05-13 PROCEDURE — 99213 OFFICE O/P EST LOW 20 MIN: CPT

## 2020-05-13 NOTE — PROGRESS NOTES
"Subjective:       Patient ID: Azucena Morrison is a 69 y.o. female.    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 Pt denies any fevers or chills but states she feels nauseous at times. Returned to [place] June 28, from [place], reports much trouble with abdominal wound while in [place].  7/6/17-- Patient scheduled for surgical drainage of wound Tuesday 7/11/17DB  7/19/17-- Patient post op clinic visit.  8/16/17: CT of abdomen and pelvis done 8/9/17 due to suspected fistula  8/23/17-- U/S of abdomen done today.  12/13/17 Wound vac with 20cc drainage in clinic today.  1/10/18: on PO abx  1/24/18: Fistulagram ordered per Dr. Horton. Patient still on PO abx  02/21/18 Patient is not on antibiotics at this time. BS fasting 135 per patient report this am.  03/07/18 Culture of Anterior Abdominal Wound is positive. No antibiotics at this time.  fasting per patient report.    03/21/18 Patient c/o nausea along with abdominal tenderness near abd midline wound. Patient states that pain level is 6 and that she passed two sero-sanguineous clots from wound. Patient is afebrile this am.  3/28/18: patient continue antibiotics and reports that pain is less than last weeks clinic visit. Drainage has decreased as well  04/04/18 Patient states that abdominal pain is "pressure" sensation and that when she pushes down on her abdomen on either side of abdominal wound she feels like she has to urinate. Patient reports pain level of 3 this am.  fasting this am per patient report.   5/2/18: Patient had Abdomina Toribio today before wound care. She had discontinued Bactrim PO per Dr. Horton's recommendation and culture results and is now taking Amoxicillin PO  6/20/18: Pt reports itching to wound and periwound   6/27/18: patient reports no new complaints with regards to her wound or abdomen, wound continues to decrease in size and " drainage  8/1/18: Pt reports increased pain to abdomen with nausea and emesis since thursday 7/26/18, denies any fevers, patient did no go to ER as instructed by home health nurse on Sat. 7/28/18.  8/29/18: Patient admitted to hospital 8/2 for abdominal wound complications. Surgery for abdominal abscess per Dr. Horton on 8/3. Patient placed on IV antibiotics and discharged home on 8/18 with [facility]  and [facility]. Patient currently on IV ertapenem. IV medication sent to patients home per [facility].   9/5/18: IV Ertapenem to continue 1 week. Culture sent to lab. [facility]  sent orders. PICC line intact to [facility].  9/12/18 Antibiotics completed. Culture results pending. 1 deep stitch remaining.  9/12/18: Culture positive for E. Coli, patient to continue Ertapenem IV antibiotics x 2weeks. [facility] notified  9/26/18: F/U abdominal wound, wound continues to improve. Patient will complete IV antibiotics today  10/3/18: patient c/o diarrhea for 2 days and nausea with some vomiting. Labs and stool culture ordered. IV antibiotics discontinued today  10/10/2018 patient will start on IV antibiotic Invaz IV once a day, pending PICC line placement, order placed today  for PICC per Dr. Horton.  10/24/18: patient on IV antibiotics, tolerating well. Continue for 1 week  11/7/2018: IV Antibiotic discontinued.  11/8/2018: PICC line to left upper arm discontinued by Home Health.  11/21/2018 F/u for abdominal wall fistula , c/o nausea  No vomiting , no fever  12/5/2018: F/u for abdominal wall fisula, c/o gas, Dr. Horton will order Bentyl. Surgery schedule for January 2019.  12/12/2018: F/u for abdominal wall fistula, c/o abdominal pain. Dr. Horton ordered Norco 5/325mg tab 1 PO every 4 hours as needed for pain and referred patient for x-ray of abdomen after clinic today.  12/19/2018: F/u for abdominal wall fisula, c/o nausea, Dr. Horton re ordered Ondansetron (Zofran) 8mg one tab by mouth every eight  hours as needed for nausea. No changes in wound condition from last visit noted in clinic today.  12/26/2018: F/u abdominal wall fistula drainage, no c/o at this time. Denies any abdominal pain or nausea. Dr. Horton recommends surgery first week of January 2019 for abdominal fistula treatment and possible colostomy placement, patient agree.  01/02/2019: F/u abdominal wall fistula. Dressing with large amount of drainage, malodorous, Dr. Horton is scheduling surgery for third week of January, 2019.  1/16/19: F/U Dr. Horton today in clinic, surgery scheduled for next Friday 1/25/19 with Dr. Horton.     01/23/2019: Presents to wound care clinic for f/u abdominal wound care tx. Surgery scheduled with Dr. Horton for Friday 01/25/2019. Dr. Horton explained Mrs. Morrison surgery procedure including possible complications, Nurse  (Swazi) present, patient verbalizes understanding of procedure including possible complications, all questions from patient were answered by Dr. Horton including blood sugar and blood pressure medications per patient's concerns. Consent for surgery and blood transfusion signed by patient, Dr. Horton and nurse witnessed.  Abdominal wound cultures collected per Dr. Horton, cultures sent to lab/micro pending results. Dr. Horton prescribed the following orders: fleet enema for Thursday 1/24/2019, clear liquid diet and not to eat after midnight all for 1/24/2019. Start taking Neomycin and erythromycin by mouth three times a day (1/24/2019) and dulcolax one tab by mouth twice a day, Mrs. Morrison voices understanding.     01/30/2019: F/U wound care treatment with Dr. Horton. Per pt, primary physician Dr. Pj Monae ordered for her to take potassium pills for three days, last day today and scheduled for lab work at primary physician clinic on 1/31/2019. Per pt. Feeling better, no more abdominal pain (went to ER 1/24/2019 and discharged 1/25/2019 c/o abd pain.). Dr. Horton awaiting  "on primary clearance to re-schedule surgery, per pt. She will call wound care clinic and Dr. Horton to make aware of clearance day. Continue with same orders for dressing change for Dr. Horton. Mrs. Morrison requesting gentamicin refill.     02/06/2019: F/u with Dr. Horton for wound care treatment. Mrs. Morrison brought to clinic a clearance for surgery by Dr. Pj Sanches dated 02/06/2019 "Hyperkalemia-Resolved K 4.8 2/1/2019, labs , Cr. 1.05, H/H 10/30. No contraindication to surgery, tight control of BS, Hydration." A copy of EKG (1/24/2019) and lab work from [facility] collected 1/31/2019, reported results 2/1/2019. Dr. Horton scheduled surgery for 02/22/2019, consent were signed on 01/23/2019, all questions were answered by Dr. Horton, this nurse  (Slovenian) present. Education provided to patient on the importance of having a clear liquid diet the day before surgery 02/21/2019 as per Dr. Horton, new medications and preop preparation before surgery, verbalizes understanding. Dr. Horton ordered Norco 5/325 mg PO for pain, Dulcolax two tabs by mouth to take on 02/21/2019, Soap suds enema (SSE) on Thursday 02/21/2019, Golytely by mouth 2 liters on 2/21/2019, Erythromycin 500 mg one tab by mouth three times a day and Neomycin 1 gm by mouth three times a day.     2/13/2019: Presents to wound care clinic for a f/u with Dr. Horton for wound care treatment. No new orders in wound dressing change, reviewed preop orders with Mrs. Morrison per Dr. Horton, all questions answered. Surgery scheduled for 02/22/2019.  2/20/19: Continues wound care a previously ordered.  Depth measured per Dr. Horton 8cm.  Preop orders reviewed with patient who verbalized understanding.  Surgery scheduled for Fri 2/22/19.  Rx given to patient for Norco and Zofran.    03/06/19: Patient admitted to [facility] on 02/22/19 for exploratory laparotomy of abdomen per Dr. Horton. Patient discharged on 02/28/19 with " "home health and PICC line with IV antibiotics. Staples removed today in clinic. Patient denies any fever, chills, n&v; however, she states that she has "low energy." Continued with wound care as ordered.     3/13/2019: F/U with Dr. Horton for wound care treatment. Continue with same wound dressing change orders as per Dr. Horton, orders followed. Home Health to continue wound dressing change and lab draw for CBC weekly; continue IV Ertapenem/Invaz 1 gm IV daily as ordered.     3/20/2019: Clinic visit with Dr. Horton for abdominal abscess treatment. Presents with moderate draining from abdominal wound; wound size decreasing per measurement. Per Dr. Horton, apply one-piece system ostomy bag to abdominal wound for drain collection, may drain bag when it fills and may change every other day, continue applying gentamicin to wound bed before applying ostomy bag. Continue IV Invaz until completion. If ostomy bag not effective for draining collection, may return to previous orders for wound dressing change. Orders followed. Education provided to Mrs. Morrison on handwashing and ostomy care techniques, voices and demonstrates understanding.   3/27/19: Follow up with Dr. Horton today in clinic for abdominal abscess, moderated tanish yellow drainge present on dressing.  Wound slowly improving, patient refused one-piece ostomy bag stated " no it's too messy." requested to return to previous dressing prior to ostomy bag placement- iodoform gauze, aquacel extra, sm abd, and mefix tape.  Silver nitrate x1 per Dr. Horton today in clinic. Rx given to patient for PO Zofran.     04/03/2019: F/u clinic visit with Dr. Horton. Abdominal wound improving in size, picture on file. Wound cultures from abdominal abscess collected and sent to lab/micro, pending results. Mrs. Morrison states going out of the country ([place]) at the end of April and plans to stay there for approximately two months. Dr. Horton ordered IV antibiotic for " two more weeks and new wound care dressing, orders followed.     4/9/2019: Clinic visit with Dr. Horton.  Per Dr. Horton, wound deep measurement increased, draining present. Wound cultures from abdominal wound taken and sent to lab/micro, pending results. C/o abdominal pain and itching around wound, Rx for betamethasone cream 01.% and Norco 5/325mg give in clinic by Dr. Horton. Wound care dressing orders followed. Continue IV antibiotic as previously ordered.  04/17/19: F/u for non-healing wound to abdomen. Culture report negative. Dr. Horton ordered 1 more week of IV antibiotics. Continue with topical wound care as ordered.     4/24/2019: Clinic visit with Dr. Horton for abdominal wound treatment. Wound improvement present, pictures on file. Last IV antibiotic today 4/24/2019, [facility] home [facility] to discontinue PICC line from left upper arm on 4/25/2019. Per Lyubov Prince, going to [place] on Friday 4/26/2019 and out of the country ([place]) on Monday 4/29/2019. This nurse spoke to Shaunna at [employer] ([employer] antibiotic delivery company) and informed last dose of antibiotic is today. Dr. Horton ordered Hydrocodone-acetaminophen (Norco) 5-325 mg (32 tabs), Bentyl 10 mg (120 capsules) and Gentamycin ointment. Education provided on the importance of eating food that contains iron (to prevent anemia) as well as eating meat and taking her blood pressure medications (blood pressure medication) on time, voices understanding. All questions answered by Dr. Horton. [facility] outpatient confirmed Betamethasone cream ordered on 4/10/2019 is ready for . Instructions and education provided to Mrs. Morrison on abdominal wound dressing changes, hand washing techniques and medication use, effects and side effects, voices and demonstrates understanding. Discharged home on stable conditions, [facility] will give wound care clinic a call when she is back from [place] in few months from now.     7/17/19: Readmit today to  wound care clinic.  Patient was recently out of the country visiting family in [place].  Patient complains of pain and nausea as on prior visits.  Dr. Horton seen patient today discussed with patient again placing colostomy patient refused.  Culture of wound taken, Dr. Horton restarted patient on zofran po for nausea, and Norco for pain. [facility] restarted today in clinic on Mondays and Fridays and prn. Orders given to gently pack wound with aquacel ag rope, cover with aquacel extra, 4x4 gauze, small abd pad and mefix tape change dressing ever other day by [facility] and Prn per patient.     7/24/2019: Clinic visit with Dr. Horton. Continue with dressing change as ordered. Wound cultures reviewed with patient, lab work ordered by Dr. Horton, no fever present or reported at this time. C/o decrease appetite, medication periactin prescribed. Requesting needles for insulin pen, order prescribed by Dr. Horton.      7/31/2019: F/U clinic visit with Dr. Horton. C/o of excruciated back pain 10/10, not feeling well, lot of gas and left upper quadrant discomfort. New orders prescribed: Augmentin 875-125 mg by mouth twice a day. Bentyl 10 mg one tab by mouth 4 times a day. Tramadol 50 mg one tab by mouth every 6 hours as needed for pain. Abdomen ultrasound. Wound care dressing completed as ordered.     8/7/2019: Clinic visit with Dr. Horton, denies any pain at this time. Wound care dressing done as ordered, no changes in wound size from last visit noted. Continue taking oral antiiotic as ordered, pending abdominal ultrasound, scheduled for 8/13/2019.     8/14/2019: F/U clinic visit with Dr. Horton. Admitted and discharged from emergency department this morning with abdominal pain, CT and ultrasound in filed. Dr. Horton performed a small drainage from the abdominal wound, moderate amount of creamy, serosanguineous fluid from abdominal wound present. Iodoform gauze packed into her wound, dressing changed  as ordered. Wound cultures collected and sent to lab/micro, pending results. Rx for Norco 5/325 mg one tab PO every 4 hours PRN as needed # 24 given to pt by Dr. Horton.      8/21/2019: Clinic visit with Dr. Horton. Wound dressing changed as ordered.      11/6/19: Follow up appt with Dr Horton. Wounds improving slowly. Continues to take antibiotics (Augmentin 875-125mg). No complaints voiced. Follow up in 1 week.  11/13/19:  F/U clinic visit with Dr. Horton.  Wound depth per Dr. Horton is 8.5 cm.  Continue PT Augmentin.  Patient is co left sided abdominal pain.  Abdominal US and labs ordered.  Wound care tolerated well.  Fu with Dr. Horton in 1 week.      11/20/19:Dr Horton assessed patient. Silver nitrate applied to wound. Continuing present plan of care. Patient is scheduled to have abdominal ultra sound Friday 11/22/19. Continues to take Augmentin.  F/U in 1 week.    11/27/19: Follow up in clinic with Dr. Horton. Wound depth 8.5cm, silver nitrate x 3 per Dr. Horton, patient continues taking po Augmentin, c/o left sided abdominal pain, ultrasound results discussed with patient, Dr. Horton will watch for now.  Follow up 1 week 12/4/19.      12/04/19: F/u Dr. Horton. Continue with current wound care treatment. Rx given for Bentyl 10 mg. Patient to follow up in  1 week at wound clinic  12/11/19: F/u with . Continue with current wound care treatment. Rx given for Lotrisone cream to be applied daily per patient to right lower leg rash. Patient to follow up in  1 week at wound clinic     12/18/19: F/u with Dr. Horton. Patient c/o pain 9 out of 10  to abdomen LLQ. Area of pain is warm to touch, pink, and a small nodule can be felt. Patient states that symptoms began about 4 days ago. Stat CT scan ordered. Patient will have CT done tomorrow due to not fasting this morning. Instructed patient to fast before CT scan. Continue with current wound care treatment      01/08/2020: F/u with Dr. Horton.  Patient had I&D on 12/22/19 for abscess to abdomen. New wound care orders given. Patient continues on po abx and home health for wound care. Follow up in 1 week at wound care clinic     01/15/20: F/u with Dr. Horton. Patient c/o mild discomfort at wound to left abdomen. Patient denies any fever, chills, n&v, diarrhea, and/or constipation. Will continue to monitor. Continue with current wound care treatment and follow up in  Wound clinic in 1 week.  1/29/2020: Fu today in wound care clinic with .  Wounds improving slowly.  Silver nitrate x 1 to each wound per Dr. Horton patient tolerated well.  RX give to patient today for Amoxicillin po, Bentyl Po and Norco 5/325mg Po today in clinic.  Fu 1 week 2/5/2020 with Dr. Horton.  2/5/20: F/U with Dr. Horton. LUQ site resolved. Midline site same. Cont. POC.  2/12/20: F/U with Dr. Horton. LUQ site and midline probed by Dr. Horton. Cont. Augmentin. Rx for Norco provided. Cont. POC.  2/19/20: F/U with Dr. Horton. Afebrile. No s/s of infection. Culture done of midline today. Rx for Augmentin and Zofran sent electronically to pharmacy. Return in 1 week.  2/26/2020: FU MD visit. Cultures reviewed per MD, po abx changed from augmentin to tetracycline sent to pharmacy, patient verbalized understanding. Patient states she will be traveling to [place] on March 29 for 3 months.      03/04/2020: F/u with Dr. Horton. Patient c/o pain at wound site located on left abdomen, lethargy, and no appetite for the past 3 days . Culture taken and patient instructed to stop po tetracycline while awaiting culture results. New wound care orders given for left abdominal wound. Orders routed to home health. Patient to follow up in 1 week at wound clinic.  3/11/20: F/U with Dr. Horton. No c/o for pain today. Stop tetracycline. Rx for Augmentin sent to pharmacy. Cont. POC. Return in 1 week.  orders routed.  3/18/20: F/U with Dr. Horton. No c/o of pain at this time. Rx for Bentyl  and Norco provided. Continue Augmentin. Return in 1 week.  orders routed.     04/01/2020: F/u with Dr. Horton. Wound showing improvement. Patient states that she has not had home health nurse come out in 2 weeks. Patient also states that she would like us to order supplies so she can begin doing her own wound care. Supplies ordered. Patient instructed on daily wound care treatment and verbalized understanding. Continue po Augmentin BID  04/08/2020: F/u with Dr. Horton. Wound improving. Continue with current wound care treatment. Patient given rx for norco 5/325 mg and zofran 8 mg. No new complaints per patient today   04/22/20  F/U with Dr. Horton.  Wound unchanged.  Pack wounds with plain nu gauze with gentamicin ointment.  Patient given RX for Norco 5/325 mg, Zofran 8 mg, Amoxiciilin .  Patient to follow  Up with Dr. Horton in 1 week.  Wound care supplies ordered.  Review of Systems   04/29/2020: F/u with Dr. Horton. Wound probed with cotton-tip applicator per Dr. Horton to assist with fluid drainage. Patient tolerated well. Continue with current wound care treatment and follow up in 1 week at wound clinic  05/06/2020: F/u with Dr. Horton. Wound remains stable. Patient experiencing increased pain. CT scan of abdomen ordered by PCP. Patient got CT scan on Monday, and Dr. Horton reviewed results with patient. Continue current wound care treatment and follow up in 1 week  05/13/2020- pt follow-up with Dr. Horton. Superficial skin discoloration noted to rle, addressed by Dr. Horton. Rx for bentyl and lotrisone sent with patient. Patient to continue daily dressing changes. Patient to follow-up with Dr. Horton 05/20/2020.    Review of Systems   Constitutional: Negative.    HENT: Negative.    Eyes: Negative.    Respiratory: Negative.    Cardiovascular: Negative.    Gastrointestinal: Negative.    Genitourinary: Negative.    Musculoskeletal: Negative.    Skin: Negative.    Neurological: Negative.     Psychiatric/Behavioral: Negative.        Objective:      Physical Exam   Constitutional: She is oriented to person, place, and time. She appears well-developed and well-nourished.   HENT:   Head: Normocephalic.   Eyes: Pupils are equal, round, and reactive to light. Conjunctivae and EOM are normal.   Neck: Normal range of motion. Neck supple.   Cardiovascular: Normal rate, regular rhythm, normal heart sounds and intact distal pulses.   Pulmonary/Chest: Effort normal and breath sounds normal.   Abdominal: Soft. Bowel sounds are normal.   Musculoskeletal: Normal range of motion.   Neurological: She is alert and oriented to person, place, and time. She has normal reflexes.   Skin: Skin is warm and dry.       Assessment:       1. Abdominal wall abscess           Wound 02/01/20 1353 Ulceration midline Abdomen #1 (Active)   02/01/20 1353    Pre-existing: Yes   Primary Wound Type: Ulceration   Side:    Orientation: midline   Location: Abdomen   Wound/PI Number (optional): #1   Ankle-Brachial Index:    Pulses:    Removal Indication and Assessment:    Wound Outcome:    (Retired) Wound Type:    (Retired) Wound Length (cm):    (Retired) Wound Width (cm):    (Retired) Depth (cm):    Wound Description (Comments):    Removal Indications:    Wound Image   5/13/2020  3:00 PM   Dressing Appearance Intact 5/13/2020  3:00 PM   Drainage Amount Moderate 5/13/2020  3:00 PM   Drainage Characteristics/Odor Creamy;Tan 5/13/2020  3:00 PM   Appearance Pink;Moist 5/13/2020  3:00 PM   Tissue loss description Full thickness 5/13/2020  3:00 PM   Red (%), Wound Tissue Color 100 % 5/13/2020  3:00 PM   Periwound Area Intact 5/13/2020  3:00 PM   Wound Edges Open 5/13/2020  3:00 PM   Wound Length (cm) 0.3 cm 5/13/2020  3:00 PM   Wound Width (cm) 0.3 cm 5/13/2020  3:00 PM   Wound Depth (cm) 3 cm 5/13/2020  3:00 PM   Wound Volume (cm^3) 0.27 cm^3 5/13/2020  3:00 PM   Wound Surface Area (cm^2) 0.09 cm^2 5/13/2020  3:00 PM   Care Cleansed with:;Sterile  normal saline 5/13/2020  3:00 PM   Dressing Calcium alginate;Gauze;Abd pad 5/13/2020  3:00 PM   Dressing Change Due 05/14/20 5/13/2020  3:00 PM            Wound 03/04/20 1023 Abdomen #2 (Active)   03/04/20 1023    Pre-existing:    Primary Wound Type:    Side: Left   Orientation:    Location: Abdomen   Wound/PI Number (optional): #2   Ankle-Brachial Index:    Pulses:    Removal Indication and Assessment:    Wound Outcome:    (Retired) Wound Type:    (Retired) Wound Length (cm):    (Retired) Wound Width (cm):    (Retired) Depth (cm):    Wound Description (Comments):    Removal Indications:    Wound Image   5/13/2020  3:00 PM   Dressing Appearance Intact 5/13/2020  3:00 PM   Drainage Amount Small 5/13/2020  3:00 PM   Drainage Characteristics/Odor Creamy;Tan 5/13/2020  3:00 PM   Appearance [place] 5/13/2020  3:00 PM   Tissue loss description Full thickness 5/13/2020  3:00 PM   Red (%), Wound Tissue Color 100 % 5/13/2020  3:00 PM   Periwound Area Intact 5/13/2020  3:00 PM   Wound Edges Defined 5/13/2020  3:00 PM   Wound Length (cm) 0.2 cm 5/13/2020  3:00 PM   Wound Width (cm) 0.4 cm 5/13/2020  3:00 PM   Wound Depth (cm) 2 cm 5/13/2020  3:00 PM   Wound Volume (cm^3) 0.16 cm^3 5/13/2020  3:00 PM   Wound Surface Area (cm^2) 0.08 cm^2 5/13/2020  3:00 PM   Care Cleansed with:;Sterile normal saline 5/13/2020  3:00 PM   Dressing Calcium alginate;Gauze;Abd pad 5/13/2020  3:00 PM   Periwound Care Absorptive dressing applied 5/13/2020  3:00 PM   Dressing Change Due 05/14/20 5/13/2020  3:00 PM       Abdominal wound midline   Clean wound with normal saline  Lidocaine 2% gel or 4 % Topical solution: PRN in clinic  Danielle wound: Cavilon, betamethasone PRN itching  Primary dressing: Apply gentamycin to plain nu gauze and lightly pack into wound bed   Secondary dressing: Cover with aquacel extra, 4 x 4 gauze, small ABD, and secure with mefix tape      Left Upper Quadrant abdomen   Cleanse with normal saline  Lidocaine 2% gel or 4 % Topical  solution: PRN in clinic  Periwound: [place], betamethasone PRN itching  Primary dressing: Apply gentamicin ointment to plain packing and gently pack to wound depth  Secondary dressing: Cover with aquacel extra, 4x4 gauze, small abd pad, and secure with mefix tape     Frequency: Daily per patient    Follow up in 1 week 05/20/2020 with Dr. Horton  Other orders: Rx for bentyl and Lotrisone sent with patient    Wound care supplies for home(daily) dressing changes    4 X 4 gauze   Tape  Sterile q-tip       Plan:                Follow up in about 1 week (around 5/20/2020).

## 2020-05-21 ENCOUNTER — HOSPITAL ENCOUNTER (OUTPATIENT)
Dept: WOUND CARE | Facility: HOSPITAL | Age: 70
Discharge: HOME | End: 2020-05-21
Attending: SURGERY
Payer: MEDICARE

## 2020-05-21 VITALS
WEIGHT: 157 LBS | TEMPERATURE: 97 F | BODY MASS INDEX: 26.16 KG/M2 | HEART RATE: 72 BPM | HEIGHT: 65 IN | SYSTOLIC BLOOD PRESSURE: 132 MMHG | DIASTOLIC BLOOD PRESSURE: 78 MMHG

## 2020-05-21 DIAGNOSIS — K63.2 COLOCUTANEOUS FISTULA: ICD-10-CM

## 2020-05-21 DIAGNOSIS — L02.211 ABDOMINAL WALL ABSCESS: Primary | ICD-10-CM

## 2020-05-21 DIAGNOSIS — Z98.890 S/P EXPLORATORY LAPAROTOMY: ICD-10-CM

## 2020-05-21 DIAGNOSIS — R10.9 INTRACTABLE ABDOMINAL PAIN: ICD-10-CM

## 2020-05-21 DIAGNOSIS — K56.609 SMALL BOWEL OBSTRUCTION: ICD-10-CM

## 2020-05-21 DIAGNOSIS — L08.9 LOCAL INFECTION OF SKIN AND SUBCUTANEOUS TISSUE: ICD-10-CM

## 2020-05-21 DIAGNOSIS — E11.620 TYPE 2 DIABETES MELLITUS WITH DIABETIC DERMATITIS, WITHOUT LONG-TERM CURRENT USE OF INSULIN: ICD-10-CM

## 2020-05-21 DIAGNOSIS — E11.9 TYPE 2 DIABETES MELLITUS WITHOUT COMPLICATION, WITHOUT LONG-TERM CURRENT USE OF INSULIN: ICD-10-CM

## 2020-05-21 DIAGNOSIS — T14.8XXA DRAINAGE FROM WOUND: ICD-10-CM

## 2020-05-21 DIAGNOSIS — T81.43XA POSTPROCEDURAL INTRAABDOMINAL ABSCESS: ICD-10-CM

## 2020-05-21 DIAGNOSIS — R10.32 LLQ PAIN: ICD-10-CM

## 2020-05-21 PROCEDURE — 99213 OFFICE O/P EST LOW 20 MIN: CPT

## 2020-05-21 PROCEDURE — 87077 CULTURE AEROBIC IDENTIFY: CPT

## 2020-05-21 PROCEDURE — 87070 CULTURE OTHR SPECIMN AEROBIC: CPT

## 2020-05-21 PROCEDURE — 87186 SC STD MICRODIL/AGAR DIL: CPT

## 2020-05-21 NOTE — PROGRESS NOTES
"[facility]                Progress Note    Subjective:       Patient ID: Azucena Morrison is a 69 y.o. female.    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 Pt denies any fevers or chills but states she feels nauseous at times. Returned to [place] June 28, from [place], reports much trouble with abdominal wound while in [place].  7/6/17-- Patient scheduled for surgical drainage of wound Tuesday 7/11/17DB  7/19/17-- Patient post op clinic visit.  8/16/17: CT of abdomen and pelvis done 8/9/17 due to suspected fistula  8/23/17-- U/S of abdomen done today.  12/13/17 Wound vac with 20cc drainage in clinic today.  1/10/18: on PO abx  1/24/18: Fistulagram ordered per Dr. Horton. Patient still on PO abx  02/21/18 Patient is not on antibiotics at this time. BS fasting 135 per patient report this am.  03/07/18 Culture of Anterior Abdominal Wound is positive. No antibiotics at this time.  fasting per patient report.    03/21/18 Patient c/o nausea along with abdominal tenderness near abd midline wound. Patient states that pain level is 6 and that she passed two sero-sanguineous clots from wound. Patient is afebrile this am.  3/28/18: patient continue antibiotics and reports that pain is less than last weeks clinic visit. Drainage has decreased as well  04/04/18 Patient states that abdominal pain is "pressure" sensation and that when she pushes down on her abdomen on either side of abdominal wound she feels like she has to urinate. Patient reports pain level of 3 this am.  fasting this am per patient report.   5/2/18: Patient had Abdomina Toribio today before wound care. She had discontinued Bactrim PO per Dr. Horton's recommendation and culture results and is now taking Amoxicillin PO  6/20/18: Pt reports itching to " wound and periwound   6/27/18: patient reports no new complaints with regards to her wound or abdomen, wound continues to decrease in size and drainage  8/1/18: Pt reports increased pain to abdomen with nausea and emesis since thursday 7/26/18, denies any fevers, patient did no go to ER as instructed by home health nurse on Sat. 7/28/18.  8/29/18: Patient admitted to hospital 8/2 for abdominal wound complications. Surgery for abdominal abscess per Dr. Horton on 8/3. Patient placed on IV antibiotics and discharged home on 8/18 with [facility]  and [place] [facility] [place]. Patient currently on IV ertapenem. IV medication sent to patients home per [facility] [employer].   9/5/18: IV Ertapenem to continue 1 week. Culture sent to lab. [facility]  sent orders. [place] [facility]  9/12/18 Antibiotics completed. Culture results pending. 1 deep stitch remaining.  9/12/18: Culture positive for E. Coli, patient to continue Ertapenem IV antibiotics x 2weeks. [facility] notified  9/26/18: F/U abdominal wound, wound continues to improve. Patient will complete IV antibiotics today  10/3/18: patient c/o diarrhea for 2 days and nausea with some vomiting. Labs and stool culture ordered. IV antibiotics discontinued today  10/10/2018 patient will start on IV antibiotic Invaz IV once a day, pending PICC line placement, order placed today  for PICC per Dr. Horton.  10/24/18: patient on IV antibiotics, tolerating well. Continue for 1 week  11/7/2018: IV Antibiotic discontinued.  11/8/2018: PICC line to left upper arm discontinued by [facility].  11/21/2018 F/u for abdominal wall fistula , c/o nausea  No vomiting , no fever  12/5/2018: F/u for abdominal wall fisula, c/o gas, Dr. Horton will order Bentyl. Surgery schedule for January 2019.  12/12/2018: F/u for abdominal wall fistula, c/o abdominal pain. Dr. Horton ordered Norco 5/325mg tab 1 PO every 4 hours as needed for pain and referred patient for x-ray of abdomen after clinic  today.  12/19/2018: F/u for abdominal wall fisula, c/o nausea, Dr. Horton re ordered Ondansetron (Zofran) 8mg one tab by mouth every eight hours as needed for nausea. No changes in wound condition from last visit noted in clinic today.  12/26/2018: F/u abdominal wall fistula drainage, no c/o at this time. Denies any abdominal pain or nausea. Dr. Horton recommends surgery first week of January 2019 for abdominal fistula treatment and possible colostomy placement, patient agree.  01/02/2019: F/u abdominal wall fistula. Dressing with large amount of drainage, malodorous, Dr. Horton is scheduling surgery for third week of January, 2019.  1/16/19: F/U Dr. Horton today in clinic, surgery scheduled for next Friday 1/25/19 with Dr. Horton.     01/23/2019: Presents to wound care clinic for f/u abdominal wound care tx. Surgery scheduled with Dr. Horton for Friday 01/25/2019. Dr. Horton explained Mrs. Morrison surgery procedure including possible complications, Nurse  (American) present, patient verbalizes understanding of procedure including possible complications, all questions from patient were answered by Dr. Horton including blood sugar and blood pressure medications per patient's concerns. Consent for surgery and blood transfusion signed by patient, Dr. Horton and nurse witnessed.  Abdominal wound cultures collected per Dr. Horton, cultures sent to lab/micro pending results. Dr. Horton prescribed the following orders: fleet enema for Thursday 1/24/2019, clear liquid diet and not to eat after midnight all for 1/24/2019. Start taking Neomycin and erythromycin by mouth three times a day (1/24/2019) and dulcolax one tab by mouth twice a day, Mrs. Morrison voices understanding.     01/30/2019: F/U wound care treatment with Dr. Horton. Per pt, primary physician Dr. Pj Monae ordered for her to take potassium pills for three days, last day today and scheduled for lab work at primary physician clinic on  "1/31/2019. Per pt. Feeling better, no more abdominal pain (went to ER 1/24/2019 and discharged 1/25/2019 c/o abd pain.). Dr. Horton awaiting on primary clearance to re-schedule surgery, per pt. She will call wound care clinic and Dr. Horton to make aware of clearance day. Continue with same orders for dressing change for Dr. Horton. Mrs. Morrison requesting gentamicin refill.     02/06/2019: F/u with Dr. Horton for wound care treatment. Mrs. Morrison brought to clinic a clearance for surgery by Dr. Pj Sanches dated 02/06/2019 "Hyperkalemia-Resolved K 4.8 2/1/2019, labs , Cr. 1.05, H/H 10/30. No contraindication to surgery, tight control of BS, Hydration." A copy of EKG (1/24/2019) and lab work from [facility] collected 1/31/2019, reported results 2/1/2019. Dr. Horton scheduled surgery for 02/22/2019, consent were signed on 01/23/2019, all questions were answered by Dr. Horton, this nurse  (Angolan) present. Education provided to patient on the importance of having a clear liquid diet the day before surgery 02/21/2019 as per Dr. Horton, new medications and preop preparation before surgery, verbalizes understanding. Dr. Horton ordered Norco 5/325 mg PO for pain, Dulcolax two tabs by mouth to take on 02/21/2019, Soap suds enema (SSE) on Thursday 02/21/2019, Golytely by mouth 2 liters on 2/21/2019, Erythromycin 500 mg one tab by mouth three times a day and Neomycin 1 gm by mouth three times a day.     2/13/2019: Presents to wound care clinic for a f/u with Dr. Horton for wound care treatment. No new orders in wound dressing change, reviewed preop orders with Mrs. Morrison per Dr. Horton, all questions answered. Surgery scheduled for 02/22/2019.  2/20/19: Continues wound care a previously ordered.  Depth measured per Dr. Horton 8cm.  Preop orders reviewed with patient who verbalized understanding.  Surgery scheduled for Fri 2/22/19.  Rx given to patient for Norco and Zofran.  " "  03/06/19: Patient admitted to [facility] on 02/22/19 for exploratory laparotomy of abdomen per Dr. Horton. Patient discharged on 02/28/19 with home health and PICC line with IV antibiotics. Staples removed today in clinic. Patient denies any fever, chills, n&v; however, she states that she has "low energy." Continued with wound care as ordered.     3/13/2019: F/U with Dr. Horton for wound care treatment. Continue with same wound dressing change orders as per Dr. Horton, orders followed. Home Health to continue wound dressing change and lab draw for CBC weekly; continue IV Ertapenem/Invaz 1 gm IV daily as ordered.     3/20/2019: Clinic visit with Dr. Horton for abdominal abscess treatment. Presents with moderate draining from abdominal wound; wound size decreasing per measurement. Per Dr. Horton, apply one-piece system ostomy bag to abdominal wound for drain collection, may drain bag when it fills and may change every other day, continue applying gentamicin to wound bed before applying ostomy bag. Continue IV Invaz until completion. If ostomy bag not effective for draining collection, may return to previous orders for wound dressing change. Orders followed. Education provided to Mrs. Morrison on handwashing and ostomy care techniques, voices and demonstrates understanding.   3/27/19: Follow up with Dr. Horton today in clinic for abdominal abscess, moderated tanish yellow drainge present on dressing.  Wound slowly improving, patient refused one-piece ostomy bag stated " no it's too messy." requested to return to previous dressing prior to ostomy bag placement- iodoform gauze, aquacel extra, sm abd, and mefix tape.  Silver nitrate x1 per Dr. Horton today in clinic. Rx given to patient for PO Zofran.     04/03/2019: F/u clinic visit with Dr. Horton. Abdominal wound improving in size, picture on file. Wound cultures from abdominal abscess collected and sent to lab/micro, pending results. Mrs. Morrison " states going out of the country ([place]) at the end of April and plans to stay there for approximately two months. Dr. Horton ordered IV antibiotic for two more weeks and new wound care dressing, orders followed.     4/9/2019: Clinic visit with Dr. Horton.  Per Dr. Horton, wound deep measurement increased, draining present. Wound cultures from abdominal wound taken and sent to lab/micro, pending results. C/o abdominal pain and itching around wound, Rx for betamethasone cream 01.% and Norco 5/325mg give in clinic by Dr. Horton. Wound care dressing orders followed. Continue IV antibiotic as previously ordered.  04/17/19: F/u for non-healing wound to abdomen. Culture report negative. Dr. Horton ordered 1 more week of IV antibiotics. Continue with topical wound care as ordered.     4/24/2019: Clinic visit with Dr. Horton for abdominal wound treatment. Wound improvement present, pictures on file. Last IV antibiotic today 4/24/2019, [facility] to discontinue PICC line from left upper arm on 4/25/2019. Per Lyubov Prince, going to [place] on Friday 4/26/2019 and out of the country ([place]) on Monday 4/29/2019. This nurse spoke to Shaunna at [employer] ([facility] antibiotic delivery company) and informed last dose of antibiotic is today. Dr. Horton ordered Hydrocodone-acetaminophen (Norco) 5-325 mg (32 tabs), Bentyl 10 mg (120 capsules) and Gentamycin ointment. Education provided on the importance of eating food that contains iron (to prevent anemia) as well as eating meat and taking her blood pressure medications (blood pressure medication) on time, voices understanding. All questions answered by Dr. Horton. [facility] outpatient confirmed Betamethasone cream ordered on 4/10/2019 is ready for . Instructions and education provided to Mrs. Morrison on abdominal wound dressing changes, hand washing techniques and medication use, effects and side effects, voices and demonstrates understanding. Discharged  home on stable conditions, [facility] will give wound care clinic a call when she is back from [place] in few months from now.     7/17/19: Readmit today to wound care clinic.  Patient was recently out of the country visiting family in [place].  Patient complains of pain and nausea as on prior visits.  Dr. Horton seen patient today discussed with patient again placing colostomy patient refused.  Culture of wound taken, Dr. Horton restarted patient on zofran po for nausea, and Norco for pain. [facility] restarted today in clinic on Mondays and Fridays and prn. Orders given to gently pack wound with aquacel ag rope, cover with aquacel extra, 4x4 gauze, small abd pad and mefix tape change dressing ever other day by [facility] and Prn per patient.     7/24/2019: Clinic visit with Dr. Horton. Continue with dressing change as ordered. Wound cultures reviewed with patient, lab work ordered by Dr. Horton, no fever present or reported at this time. C/o decrease appetite, medication periactin prescribed. Requesting needles for insulin pen, order prescribed by Dr. Horton.      7/31/2019: F/U clinic visit with Dr. Horton. C/o of excruciated back pain 10/10, not feeling well, lot of gas and left upper quadrant discomfort. New orders prescribed: Augmentin 875-125 mg by mouth twice a day. Bentyl 10 mg one tab by mouth 4 times a day. Tramadol 50 mg one tab by mouth every 6 hours as needed for pain. Abdomen ultrasound. Wound care dressing completed as ordered.     8/7/2019: Clinic visit with Dr. Horton, denies any pain at this time. Wound care dressing done as ordered, no changes in wound size from last visit noted. Continue taking oral antiiotic as ordered, pending abdominal ultrasound, scheduled for 8/13/2019.     8/14/2019: F/U clinic visit with Dr. Horton. Admitted and discharged from emergency department this morning with abdominal pain, CT and ultrasound in filed. Dr. Horton performed a small drainage from  the abdominal wound, moderate amount of creamy, serosanguineous fluid from abdominal wound present. Iodoform gauze packed into her wound, dressing changed as ordered. Wound cultures collected and sent to lab/micro, pending results. Rx for Norco 5/325 mg one tab PO every 4 hours PRN as needed # 24 given to pt by Dr. Horton.      8/21/2019: Clinic visit with Dr. Horton. Wound dressing changed as ordered.      11/6/19: Follow up appt with Dr Horton. Wounds improving slowly. Continues to take antibiotics (Augmentin 875-125mg). No complaints voiced. Follow up in 1 week.  11/13/19:  F/U clinic visit with Dr. Horton.  Wound depth per Dr. Horton is 8.5 cm.  Continue PT Augmentin.  Patient is co left sided abdominal pain.  Abdominal US and labs ordered.  Wound care tolerated well.  Fu with Dr. Horton in 1 week.      11/20/19:Dr Horton assessed patient. Silver nitrate applied to wound. Continuing present plan of care. Patient is scheduled to have abdominal ultra sound Friday 11/22/19. Continues to take Augmentin.  F/U in 1 week.    11/27/19: Follow up in clinic with Dr. Horton. Wound depth 8.5cm, silver nitrate x 3 per Dr. Horton, patient continues taking po Augmentin, c/o left sided abdominal pain, ultrasound results discussed with patient, Dr. Horton will watch for now.  Follow up 1 week 12/4/19.      12/04/19: F/u Dr. Horton. Continue with current wound care treatment. Rx given for Bentyl 10 mg. Patient to follow up in  1 week at wound clinic  12/11/19: F/u with . Continue with current wound care treatment. Rx given for Lotrisone cream to be applied daily per patient to right lower leg rash. Patient to follow up in  1 week at wound clinic     12/18/19: F/u with Dr. Horton. Patient c/o pain 9 out of 10  to abdomen LLQ. Area of pain is warm to touch, pink, and a small nodule can be felt. Patient states that symptoms began about 4 days ago. Stat CT scan ordered. Patient will have CT done tomorrow due to  not fasting this morning. Instructed patient to fast before CT scan. Continue with current wound care treatment      01/08/2020: F/u with Dr. Horton. Patient had I&D on 12/22/19 for abscess to abdomen. New wound care orders given. Patient continues on po abx and home health for wound care. Follow up in 1 week at wound care clinic     01/15/20: F/u with Dr. Horton. Patient c/o mild discomfort at wound to left abdomen. Patient denies any fever, chills, n&v, diarrhea, and/or constipation. Will continue to monitor. Continue with current wound care treatment and follow up in  Wound clinic in 1 week.  1/29/2020: Fu today in wound care clinic with .  Wounds improving slowly.  Silver nitrate x 1 to each wound per Dr. Horton patient tolerated well.  RX give to patient today for Amoxicillin po, Bentyl Po and Norco 5/325mg Po today in clinic.  Fu 1 week 2/5/2020 with Dr. Horton.  2/5/20: F/U with Dr. Horton. LUQ site resolved. Midline site same. Cont. POC.  2/12/20: F/U with Dr. Horton. LUQ site and midline probed by Dr. Horton. Cont. Augmentin. Rx for Norco provided. Cont. POC.  2/19/20: F/U with Dr. Horton. Afebrile. No s/s of infection. Culture done of midline today. Rx for Augmentin and Zofran sent electronically to pharmacy. Return in 1 week.  2/26/2020: FU MD visit. Cultures reviewed per MD, po abx changed from augmentin to tetracycline sent to pharmacy, patient verbalized understanding. Patient states she will be traveling to [place] on March 29 for 3 months.      03/04/2020: F/u with Dr. Horton. Patient c/o pain at wound site located on left abdomen, lethargy, and no appetite for the past 3 days . Culture taken and patient instructed to stop po tetracycline while awaiting culture results. New wound care orders given for left abdominal wound. Orders routed to home health. Patient to follow up in 1 week at wound clinic.  3/11/20: F/U with Dr. Horton. No c/o for pain today. Stop tetracycline. Rx for  Augmentin sent to pharmacy. Cont. POC. Return in 1 week. HH orders routed.  3/18/20: F/U with Dr. Horton. No c/o of pain at this time. Rx for Bentyl and Norco provided. Continue Augmentin. Return in 1 week.  orders routed.     04/01/2020: F/u with Dr. Horton. Wound showing improvement. Patient states that she has not had home health nurse come out in 2 weeks. Patient also states that she would like us to order supplies so she can begin doing her own wound care. Supplies ordered. Patient instructed on daily wound care treatment and verbalized understanding. Continue po Augmentin BID  04/08/2020: F/u with Dr. Horton. Wound improving. Continue with current wound care treatment. Patient given rx for norco 5/325 mg and zofran 8 mg. No new complaints per patient today   04/22/20  F/U with Dr. Horton.  Wound unchanged.  Pack wounds with plain nu gauze with gentamicin ointment.  Patient given RX for Norco 5/325 mg, Zofran 8 mg, Amoxiciilin .  Patient to follow  Up with Dr. Horton in 1 week.  Wound care supplies ordered.  Review of Systems   04/29/2020: F/u with Dr. Horton. Wound probed with cotton-tip applicator per Dr. Horton to assist with fluid drainage. Patient tolerated well. Continue with current wound care treatment and follow up in 1 week at wound clinic  05/06/2020: F/u with Dr. Horton. Wound remains stable. Patient experiencing increased pain. CT scan of abdomen ordered by PCP. Patient got CT scan on Monday, and Dr. Horton reviewed results with patient. Continue current wound care treatment and follow up in 1 week  05/13/2020- pt follow-up with Dr. Horton. Superficial skin discoloration noted to rle, addressed by Dr. Horton. Rx for bentyl and lotrisone sent with patient. Patient to continue daily dressing changes. Patient to follow-up with Dr. Horton 05/20/2020.  5/21/20:  Fu with Dr. Horton.  Purulent drainage with odor coming from wounds.  Cx of mid abdominal wound obtained.  Rx given to patient  for Amoxicillin 875/125mg and Norco 5/325mg.  Care tolerated well.  Fu with Dr. Horton in 1 week.             Review of Systems   Constitutional: Negative.    HENT: Negative.    Eyes: Negative.    Respiratory: Negative.    Cardiovascular: Negative.    Gastrointestinal: Negative.    Genitourinary: Negative.    Musculoskeletal: Negative.    Skin: Negative.    Neurological: Negative.    Psychiatric/Behavioral: Negative.          Objective:        Physical Exam   Constitutional: She is oriented to person, place, and time. She appears well-developed and well-nourished.   HENT:   Head: Normocephalic.   Eyes: Pupils are equal, round, and reactive to light. Conjunctivae and EOM are normal.   Neck: Normal range of motion. Neck supple.   Cardiovascular: Normal rate, regular rhythm, normal heart sounds and intact distal pulses.   Pulmonary/Chest: Effort normal and breath sounds normal.   Abdominal: Soft. Bowel sounds are normal.   Musculoskeletal: Normal range of motion.   Neurological: She is alert and oriented to person, place, and time. She has normal reflexes.   Skin: Skin is warm and dry.       There were no vitals filed for this visit.    Assessment:           ICD-10-CM ICD-9-CM   1. Abdominal wall abscess L02.211 682.2   2. Unspecified local infection of skin and subcutaneous tissue L08.9 686.9   3. Type 2 diabetes mellitus without complication, without long-term current use of insulin E11.9 250.00   4. S/P exploratory laparotomy Z98.890 V45.89   5. LLQ pain R10.32 789.04   6. Drainage from wound T14.8XXA 879.8   7. 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 Small bowel obstruction K56.609 560.9   9. Postprocedural intraabdominal abscess T81.49XA 998.59   10. Intractable abdominal pain R10.9 789.00            Wound 02/01/20 1353 Ulceration midline Abdomen #1 (Active)   02/01/20 1353    Pre-existing: Yes   Primary Wound Type: Ulceration   Side:    Orientation: midline   Location: Abdomen   Wound/PI Number (optional): #1    Ankle-Brachial Index:    Pulses:    Removal Indication and Assessment:    Wound Outcome:    (Retired) Wound Type:    (Retired) Wound Length (cm):    (Retired) Wound Width (cm):    (Retired) Depth (cm):    Wound Description (Comments):    Removal Indications:    Dressing Appearance Intact;Moist drainage 5/21/2020  8:00 AM   Drainage Amount Moderate 5/21/2020  8:00 AM   Drainage Characteristics/Odor Purulent;Malodorous 5/21/2020  8:00 AM   Appearance Pink;Moist 5/21/2020  8:00 AM   Tissue loss description Full thickness 5/21/2020  8:00 AM   Red (%), Wound Tissue Color 100 % 5/21/2020  8:00 AM   Periwound Area Intact;Scar tissue 5/21/2020  8:00 AM   Wound Edges Open;Defined 5/21/2020  8:00 AM   Wound Length (cm) 0.2 cm 5/21/2020  8:00 AM   Wound Width (cm) 0.2 cm 5/21/2020  8:00 AM   Wound Depth (cm) 4.8 cm 5/21/2020  8:00 AM   Wound Volume (cm^3) 0.19 cm^3 5/21/2020  8:00 AM   Wound Surface Area (cm^2) 0.04 cm^2 5/21/2020  8:00 AM   Care Cleansed with:;Sterile normal saline 5/21/2020  8:00 AM   Dressing Applied;Changed;Calcium alginate;Gauze;Abd pad 5/21/2020  8:00 AM   Periwound Care Moisturizer applied;Absorptive dressing applied 5/21/2020  8:00 AM   Dressing Change Due 05/22/20 5/21/2020  8:00 AM            Wound 03/04/20 1023 Abdomen #2 (Active)   03/04/20 1023    Pre-existing:    Primary Wound Type:    Side: Left   Orientation:    Location: Abdomen   Wound/PI Number (optional): #2   Ankle-Brachial Index:    Pulses:    Removal Indication and Assessment:    Wound Outcome:    (Retired) Wound Type:    (Retired) Wound Length (cm):    (Retired) Wound Width (cm):    (Retired) Depth (cm):    Wound Description (Comments):    Removal Indications:    Dressing Appearance Intact 5/21/2020  8:00 AM   Drainage Amount Small 5/21/2020  8:00 AM   Drainage Characteristics/Odor Purulent 5/21/2020  8:00 AM   Appearance Pink;Moist 5/21/2020  8:00 AM   Tissue loss description Full thickness 5/21/2020  8:00 AM   Red (%), Wound Tissue  Color 100 % 5/21/2020  8:00 AM   Periwound Area Intact;Scar tissue 5/21/2020  8:00 AM   Wound Edges Open;Defined 5/21/2020  8:00 AM   Wound Length (cm) 0.3 cm 5/21/2020  8:00 AM   Wound Width (cm) 0.3 cm 5/21/2020  8:00 AM   Wound Depth (cm) 4.5 cm 5/21/2020  8:00 AM   Wound Volume (cm^3) 0.4 cm^3 5/21/2020  8:00 AM   Wound Surface Area (cm^2) 0.09 cm^2 5/21/2020  8:00 AM   Care Cleansed with:;Sterile normal saline 5/21/2020  8:00 AM   Dressing Applied;Changed;Calcium alginate;Gauze;Abd pad 5/21/2020  8:00 AM   Periwound Care Absorptive dressing applied;Skin barrier film applied 5/21/2020  8:00 AM   Dressing Change Due 05/22/20 5/21/2020  8:00 AM       Abdominal wound midline   Clean wound with normal saline  Lidocaine 2% gel or 4 % Topical solution: PRN in clinic  Danielle wound: Cavilon, betamethasone PRN itching  Primary dressing: Apply gentamycin to plain nu gauze and lightly pack into wound bed   Secondary dressing: Cover with aquacel extra, 4 x 4 gauze, small ABD, and secure with mefix tape  Other:  Culture obtained    Left Upper Quadrant abdomen   Cleanse with normal saline  Lidocaine 2% gel or 4 % Topical solution: PRN in clinic  Periwound: Cavilon, betamethasone PRN itching  Primary dressing: Apply gentamicin ointment to plain packing and gently pack to wound depth  Secondary dressing: Cover with aquacel extra, 4x4 gauze, small abd pad, and secure with mefix tape     Frequency: Daily per patient    Other orders: Rx for Amoxicillin and Norco given to patient          Plan:          Follow up in 1 week 05/27/2020 with Dr. Horton

## 2020-05-26 LAB
BACTERIA SPEC AEROBE CULT: ABNORMAL
BACTERIA SPEC AEROBE CULT: ABNORMAL

## 2020-05-28 ENCOUNTER — HOSPITAL ENCOUNTER (OUTPATIENT)
Dept: WOUND CARE | Facility: HOSPITAL | Age: 70
Discharge: HOME | End: 2020-05-28
Attending: SURGERY
Payer: MEDICARE

## 2020-05-28 VITALS
TEMPERATURE: 97 F | DIASTOLIC BLOOD PRESSURE: 83 MMHG | SYSTOLIC BLOOD PRESSURE: 149 MMHG | WEIGHT: 157 LBS | HEIGHT: 65 IN | BODY MASS INDEX: 26.16 KG/M2 | HEART RATE: 84 BPM

## 2020-05-28 DIAGNOSIS — L02.211 ABDOMINAL WALL ABSCESS: Primary | ICD-10-CM

## 2020-05-28 DIAGNOSIS — Z98.890 S/P EXPLORATORY LAPAROTOMY: ICD-10-CM

## 2020-05-28 DIAGNOSIS — E11.9 TYPE 2 DIABETES MELLITUS WITHOUT COMPLICATION, WITHOUT LONG-TERM CURRENT USE OF INSULIN: ICD-10-CM

## 2020-05-28 DIAGNOSIS — K63.2 COLONIC FISTULA: ICD-10-CM

## 2020-05-28 DIAGNOSIS — L08.9 LOCAL INFECTION OF SKIN AND SUBCUTANEOUS TISSUE: ICD-10-CM

## 2020-05-28 PROCEDURE — 99213 OFFICE O/P EST LOW 20 MIN: CPT

## 2020-05-28 RX ORDER — TETRACYCLINE HYDROCHLORIDE 500 MG/1
500 CAPSULE ORAL 4 TIMES DAILY
Qty: 60 CAPSULE | Refills: 3 | Status: SHIPPED | OUTPATIENT
Start: 2020-05-28 | End: 2020-05-28 | Stop reason: SDUPTHER

## 2020-05-28 RX ORDER — GENTAMICIN SULFATE 1 MG/G
OINTMENT TOPICAL
Qty: 30 G | Refills: 3 | Status: SHIPPED | OUTPATIENT
Start: 2020-05-28 | End: 2020-05-28 | Stop reason: SDUPTHER

## 2020-05-28 RX ORDER — TETRACYCLINE HYDROCHLORIDE 500 MG/1
500 CAPSULE ORAL 4 TIMES DAILY
Qty: 60 CAPSULE | Refills: 3 | Status: ON HOLD | OUTPATIENT
Start: 2020-05-28 | End: 2020-06-30 | Stop reason: ALTCHOICE

## 2020-05-28 RX ORDER — DOXYCYCLINE HYCLATE 100 MG
100 TABLET ORAL 2 TIMES DAILY
Qty: 60 TABLET | Refills: 1 | Status: ON HOLD | OUTPATIENT
Start: 2020-05-28 | End: 2020-07-09 | Stop reason: HOSPADM

## 2020-05-28 RX ORDER — GENTAMICIN SULFATE 1 MG/G
OINTMENT TOPICAL
Qty: 30 G | Refills: 3 | Status: ON HOLD | OUTPATIENT
Start: 2020-05-28 | End: 2020-09-16 | Stop reason: HOSPADM

## 2020-05-28 NOTE — PROGRESS NOTES
"Subjective:       Patient ID: Azucena Morrison is a 69 y.o. female.    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 Pt denies any fevers or chills but states she feels nauseous at times. Returned to [place] June 28, from [place], reports much trouble with abdominal wound while in [place].  7/6/17-- Patient scheduled for surgical drainage of wound Tuesday 7/11/17DB  7/19/17-- Patient post op clinic visit.  8/16/17: CT of abdomen and pelvis done 8/9/17 due to suspected fistula  8/23/17-- U/S of abdomen done today.  12/13/17 Wound vac with 20cc drainage in clinic today.  1/10/18: on PO abx  1/24/18: Fistulagram ordered per Dr. Horton. Patient still on PO abx  02/21/18 Patient is not on antibiotics at this time. BS fasting 135 per patient report this am.  03/07/18 Culture of Anterior Abdominal Wound is positive. No antibiotics at this time.  fasting per patient report.    03/21/18 Patient c/o nausea along with abdominal tenderness near abd midline wound. Patient states that pain level is 6 and that she passed two sero-sanguineous clots from wound. Patient is afebrile this am.  3/28/18: patient continue antibiotics and reports that pain is less than last weeks clinic visit. Drainage has decreased as well  04/04/18 Patient states that abdominal pain is "pressure" sensation and that when she pushes down on her abdomen on either side of abdominal wound she feels like she has to urinate. Patient reports pain level of 3 this am.  fasting this am per patient report.   5/2/18: Patient had Abdomina Toribio today before wound care. She had discontinued Bactrim PO per Dr. Horton's recommendation and culture results and is now taking Amoxicillin PO  6/20/18: Pt reports itching to wound and periwound   6/27/18: patient reports no new complaints with regards to her wound or abdomen, wound " continues to decrease in size and drainage  8/1/18: Pt reports increased pain to abdomen with nausea and emesis since thursday 7/26/18, denies any fevers, patient did no go to ER as instructed by home health nurse on Sat. 7/28/18.  8/29/18: Patient admitted to hospital 8/2 for abdominal wound complications. Surgery for abdominal abscess per Dr. Horton on 8/3. Patient placed on IV antibiotics and discharged home on 8/18 with [facility]  and PICC line to [facility]. Patient currently on IV ertapenem. IV medication sent to patients home per Replaced by [facility].   9/5/18: IV Ertapenem to continue 1 week. Culture sent to lab. [facility]  sent orders. PICC line intact to [facility].  9/12/18 Antibiotics completed. Culture results pending. 1 deep stitch remaining.  9/12/18: Culture positive for E. Coli, patient to continue Ertapenem IV antibiotics x 2weeks. [facility] notified  9/26/18: F/U abdominal wound, wound continues to improve. Patient will complete IV antibiotics today  10/3/18: patient c/o diarrhea for 2 days and nausea with some vomiting. Labs and stool culture ordered. IV antibiotics discontinued today  10/10/2018 patient will start on IV antibiotic Invaz IV once a day, pending PICC line placement, order placed today  for PICC per Dr. Horton.  10/24/18: patient on IV antibiotics, tolerating well. Continue for 1 week  11/7/2018: IV Antibiotic discontinued.  11/8/2018: PICC line to left upper arm discontinued by Home Health.  11/21/2018 F/u for abdominal wall fistula , c/o nausea  No vomiting , no fever  12/5/2018: F/u for abdominal wall fisula, c/o gas, Dr. Horton will order Bentyl. Surgery schedule for January 2019.  12/12/2018: F/u for abdominal wall fistula, c/o abdominal pain. Dr. Horton ordered Norco 5/325mg tab 1 PO every 4 hours as needed for pain and referred patient for x-ray of abdomen after clinic today.  12/19/2018: F/u for abdominal wall fisula, c/o nausea, Dr. Horton re ordered Ondansetron (Zofran) 8mg  one tab by mouth every eight hours as needed for nausea. No changes in wound condition from last visit noted in clinic today.  12/26/2018: F/u abdominal wall fistula drainage, no c/o at this time. Denies any abdominal pain or nausea. Dr. Horton recommends surgery first week of January 2019 for abdominal fistula treatment and possible colostomy placement, patient agree.  01/02/2019: F/u abdominal wall fistula. Dressing with large amount of drainage, malodorous, Dr. Horton is scheduling surgery for third week of January, 2019.  1/16/19: F/U Dr. Horton today in clinic, surgery scheduled for next Friday 1/25/19 with Dr. Horton.     01/23/2019: Presents to wound care clinic for f/u abdominal wound care tx. Surgery scheduled with Dr. Horton for Friday 01/25/2019. Dr. Horton explained Mrs. Morrison surgery procedure including possible complications, Nurse  (Czech) present, patient verbalizes understanding of procedure including possible complications, all questions from patient were answered by Dr. Horton including blood sugar and blood pressure medications per patient's concerns. Consent for surgery and blood transfusion signed by patient, Dr. Horton and nurse witnessed.  Abdominal wound cultures collected per Dr. Horton, cultures sent to lab/micro pending results. Dr. Horton prescribed the following orders: fleet enema for Thursday 1/24/2019, clear liquid diet and not to eat after midnight all for 1/24/2019. Start taking Neomycin and erythromycin by mouth three times a day (1/24/2019) and dulcolax one tab by mouth twice a day, Mrs. Morrison voices understanding.     01/30/2019: F/U wound care treatment with Dr. Horton. Per pt, primary physician Dr. Pj Monae ordered for her to take potassium pills for three days, last day today and scheduled for lab work at primary physician clinic on 1/31/2019. Per pt. Feeling better, no more abdominal pain (went to ER 1/24/2019 and discharged 1/25/2019 c/o abd  "pain.). Dr. Horton awaiting on primary clearance to re-schedule surgery, per pt. She will call wound care clinic and Dr. Horton to make aware of clearance day. Continue with same orders for dressing change for Dr. Horton. Mrs. Morrison requesting gentamicin refill.     02/06/2019: F/u with Dr. Horton for wound care treatment. Mrs. Morrison brought to clinic a clearance for surgery by Dr. Pj Sanches dated 02/06/2019 "Hyperkalemia-Resolved K 4.8 2/1/2019, labs , Cr. 1.05, H/H 10/30. No contraindication to surgery, tight control of BS, Hydration." A copy of EKG (1/24/2019) and lab work from [facility] collected 1/31/2019, reported results 2/1/2019. Dr. Horton scheduled surgery for 02/22/2019, consent were signed on 01/23/2019, all questions were answered by Dr. Horton, this nurse  (Honduran) present. Education provided to patient on the importance of having a clear liquid diet the day before surgery 02/21/2019 as per Dr. Horton, new medications and preop preparation before surgery, verbalizes understanding. Dr. Horton ordered Norco 5/325 mg PO for pain, Dulcolax two tabs by mouth to take on 02/21/2019, Soap suds enema (SSE) on Thursday 02/21/2019, Golytely by mouth 2 liters on 2/21/2019, Erythromycin 500 mg one tab by mouth three times a day and Neomycin 1 gm by mouth three times a day.     2/13/2019: Presents to wound care clinic for a f/u with Dr. Horton for wound care treatment. No new orders in wound dressing change, reviewed preop orders with Mrs. Morrison per Dr. Horton, all questions answered. Surgery scheduled for 02/22/2019.  2/20/19: Continues wound care a previously ordered.  Depth measured per Dr. Horton 8cm.  Preop orders reviewed with patient who verbalized understanding.  Surgery scheduled for Fri 2/22/19.  Rx given to patient for Norco and Zofran.    03/06/19: Patient admitted to [facility] on 02/22/19 for exploratory laparotomy of abdomen per Dr. Horton. Patient " "discharged on 02/28/19 with home health and PICC line with IV antibiotics. Staples removed today in clinic. Patient denies any fever, chills, n&v; however, she states that she has "low energy." Continued with wound care as ordered.     3/13/2019: F/U with Dr. Horton for wound care treatment. Continue with same wound dressing change orders as per Dr. Horton, orders followed. Home Health to continue wound dressing change and lab draw for CBC weekly; continue IV Ertapenem/Invaz 1 gm IV daily as ordered.     3/20/2019: Clinic visit with Dr. Horton for abdominal abscess treatment. Presents with moderate draining from abdominal wound; wound size decreasing per measurement. Per Dr. Horton, apply one-piece system ostomy bag to abdominal wound for drain collection, may drain bag when it fills and may change every other day, continue applying gentamicin to wound bed before applying ostomy bag. Continue IV Invaz until completion. If ostomy bag not effective for draining collection, may return to previous orders for wound dressing change. Orders followed. Education provided to Mrs. Morrison on handwashing and ostomy care techniques, voices and demonstrates understanding.   3/27/19: Follow up with Dr. Horton today in clinic for abdominal abscess, moderated tanish yellow drainge present on dressing.  Wound slowly improving, patient refused one-piece ostomy bag stated " no it's too messy." requested to return to previous dressing prior to ostomy bag placement- iodoform gauze, aquacel extra, sm abd, and mefix tape.  Silver nitrate x1 per Dr. Horton today in clinic. Rx given to patient for PO Zofran.     04/03/2019: F/u clinic visit with Dr. Horton. Abdominal wound improving in size, picture on file. Wound cultures from abdominal abscess collected and sent to lab/micro, pending results. Mrs. Morrison states going out of the country ([place]) at the end of April and plans to stay there for approximately two months. Dr." Clifford ordered IV antibiotic for two more weeks and new wound care dressing, orders followed.     4/9/2019: Clinic visit with Dr. Horton.  Per Dr. Horton, wound deep measurement increased, draining present. Wound cultures from abdominal wound taken and sent to lab/micro, pending results. C/o abdominal pain and itching around wound, Rx for betamethasone cream 01.% and Norco 5/325mg give in clinic by Dr. Horton. Wound care dressing orders followed. Continue IV antibiotic as previously ordered.  04/17/19: F/u for non-healing wound to abdomen. Culture report negative. Dr. Horton ordered 1 more week of IV antibiotics. Continue with topical wound care as ordered.     4/24/2019: Clinic visit with Dr. Horton for abdominal wound treatment. Wound improvement present, pictures on file. Last IV antibiotic today 4/24/2019, [facility] to discontinue PICC line from left upper arm on 4/25/2019. Per Lyubov Prince, going to [place] on Friday 4/26/2019 and out of the country ([place]) on Monday 4/29/2019. This nurse spoke to Shaunna at [employer] (IV antibiotic delivery company) and informed last dose of antibiotic is today. Dr. Horton ordered Hydrocodone-acetaminophen (Norco) 5-325 mg (32 tabs), Bentyl 10 mg (120 capsules) and Gentamycin ointment. Education provided on the importance of eating food that contains iron (to prevent anemia) as well as eating meat and taking her blood pressure medications (blood pressure medication) on time, voices understanding. All questions answered by Dr. Horton. [facility] outpatient confirmed Betamethasone cream ordered on 4/10/2019 is ready for . Instructions and education provided to Mrs. Morrison on abdominal wound dressing changes, hand washing techniques and medication use, effects and side effects, voices and demonstrates understanding. Discharged home on stable conditions, [facility] will give wound care clinic a call when she is back from [place] in few months from  now.     7/17/19: Readmit today to wound care clinic.  Patient was recently out of the country visiting family in [place].  Patient complains of pain and nausea as on prior visits.  Dr. Horton seen patient today discussed with patient again placing colostomy patient refused.  Culture of wound taken, Dr. Horton restarted patient on zofran po for nausea, and Norco for pain. [facility] restarted today in clinic on Mondays and Fridays and prn. Orders given to gently pack wound with aquacel ag rope, cover with aquacel extra, 4x4 gauze, small abd pad and mefix tape change dressing ever other day by [facility] and Prn per patient.     7/24/2019: Clinic visit with Dr. Horton. Continue with dressing change as ordered. Wound cultures reviewed with patient, lab work ordered by Dr. Horton, no fever present or reported at this time. C/o decrease appetite, medication periactin prescribed. Requesting needles for insulin pen, order prescribed by Dr. Horton.      7/31/2019: F/U clinic visit with Dr. Horton. C/o of excruciated back pain 10/10, not feeling well, lot of gas and left upper quadrant discomfort. New orders prescribed: Augmentin 875-125 mg by mouth twice a day. Bentyl 10 mg one tab by mouth 4 times a day. Tramadol 50 mg one tab by mouth every 6 hours as needed for pain. Abdomen ultrasound. Wound care dressing completed as ordered.     8/7/2019: Clinic visit with Dr. Horton, denies any pain at this time. Wound care dressing done as ordered, no changes in wound size from last visit noted. Continue taking oral antiiotic as ordered, pending abdominal ultrasound, scheduled for 8/13/2019.     8/14/2019: F/U clinic visit with Dr. Horton. Admitted and discharged from emergency department this morning with abdominal pain, CT and ultrasound in filed. Dr. Horton performed a small drainage from the abdominal wound, moderate amount of creamy, serosanguineous fluid from abdominal wound present. Iodoform gauze packed  into her wound, dressing changed as ordered. Wound cultures collected and sent to lab/micro, pending results. Rx for Norco 5/325 mg one tab PO every 4 hours PRN as needed # 24 given to pt by Dr. Horton.      8/21/2019: Clinic visit with Dr. Horton. Wound dressing changed as ordered.      11/6/19: Follow up appt with Dr Hotron. Wounds improving slowly. Continues to take antibiotics (Augmentin 875-125mg). No complaints voiced. Follow up in 1 week.  11/13/19:  F/U clinic visit with Dr. Horton.  Wound depth per Dr. Horton is 8.5 cm.  Continue PT Augmentin.  Patient is co left sided abdominal pain.  Abdominal US and labs ordered.  Wound care tolerated well.  Fu with Dr. Horton in 1 week.      11/20/19:Dr Horton assessed patient. Silver nitrate applied to wound. Continuing present plan of care. Patient is scheduled to have abdominal ultra sound Friday 11/22/19. Continues to take Augmentin.  F/U in 1 week.    11/27/19: Follow up in clinic with Dr. Horton. Wound depth 8.5cm, silver nitrate x 3 per Dr. Horton, patient continues taking po Augmentin, c/o left sided abdominal pain, ultrasound results discussed with patient, Dr. Horton will watch for now.  Follow up 1 week 12/4/19.      12/04/19: F/u Dr. Horton. Continue with current wound care treatment. Rx given for Bentyl 10 mg. Patient to follow up in  1 week at wound clinic  12/11/19: F/u with . Continue with current wound care treatment. Rx given for Lotrisone cream to be applied daily per patient to right lower leg rash. Patient to follow up in  1 week at wound clinic     12/18/19: F/u with Dr. Horton. Patient c/o pain 9 out of 10  to abdomen LLQ. Area of pain is warm to touch, pink, and a small nodule can be felt. Patient states that symptoms began about 4 days ago. Stat CT scan ordered. Patient will have CT done tomorrow due to not fasting this morning. Instructed patient to fast before CT scan. Continue with current wound care treatment       01/08/2020: F/u with Dr. Horton. Patient had I&D on 12/22/19 for abscess to abdomen. New wound care orders given. Patient continues on po abx and home health for wound care. Follow up in 1 week at wound care clinic     01/15/20: F/u with Dr. Horton. Patient c/o mild discomfort at wound to left abdomen. Patient denies any fever, chills, n&v, diarrhea, and/or constipation. Will continue to monitor. Continue with current wound care treatment and follow up in  Wound clinic in 1 week.  1/29/2020: Fu today in wound care clinic with .  Wounds improving slowly.  Silver nitrate x 1 to each wound per Dr. Horton patient tolerated well.  RX give to patient today for Amoxicillin po, Bentyl Po and Norco 5/325mg Po today in clinic.  Fu 1 week 2/5/2020 with Dr. Horton.  2/5/20: F/U with Dr. Horton. LUQ site resolved. Midline site same. Cont. POC.  2/12/20: F/U with Dr. Horton. LUQ site and midline probed by Dr. Horton. Cont. Augmentin. Rx for Norco provided. Cont. POC.  2/19/20: F/U with Dr. Horton. Afebrile. No s/s of infection. Culture done of midline today. Rx for Augmentin and Zofran sent electronically to pharmacy. Return in 1 week.  2/26/2020: FU MD visit. Cultures reviewed per MD, po abx changed from augmentin to tetracycline sent to pharmacy, patient verbalized understanding. Patient states she will be traveling to [place] on March 29 for 3 months.      03/04/2020: F/u with Dr. Horton. Patient c/o pain at wound site located on left abdomen, lethargy, and no appetite for the past 3 days . Culture taken and patient instructed to stop po tetracycline while awaiting culture results. New wound care orders given for left abdominal wound. Orders routed to home health. Patient to follow up in 1 week at wound clinic.  3/11/20: F/U with Dr. Horton. No c/o for pain today. Stop tetracycline. Rx for Augmentin sent to pharmacy. Cont. POC. Return in 1 week.  orders routed.  3/18/20: F/U with Dr. Horton. No c/o  of pain at this time. Rx for Bentyl and Norco provided. Continue Augmentin. Return in 1 week. HH orders routed.     04/01/2020: F/u with Dr. Horton. Wound showing improvement. Patient states that she has not had home health nurse come out in 2 weeks. Patient also states that she would like us to order supplies so she can begin doing her own wound care. Supplies ordered. Patient instructed on daily wound care treatment and verbalized understanding. Continue po Augmentin BID  04/08/2020: F/u with Dr. Horton. Wound improving. Continue with current wound care treatment. Patient given rx for norco 5/325 mg and zofran 8 mg. No new complaints per patient today   04/22/20  F/U with Dr. Horton.  Wound unchanged.  Pack wounds with plain nu gauze with gentamicin ointment.  Patient given RX for Norco 5/325 mg, Zofran 8 mg, Amoxiciilin .  Patient to follow  Up with Dr. Horton in 1 week.  Wound care supplies ordered.  Review of Systems   04/29/2020: F/u with Dr. Horton. Wound probed with cotton-tip applicator per Dr. Horton to assist with fluid drainage. Patient tolerated well. Continue with current wound care treatment and follow up in 1 week at wound clinic  05/06/2020: F/u with Dr. Horton. Wound remains stable. Patient experiencing increased pain. CT scan of abdomen ordered by PCP. Patient got CT scan on Monday, and Dr. Horton reviewed results with patient. Continue current wound care treatment and follow up in 1 week  05/13/2020- pt follow-up with Dr. Horton. Superficial skin discoloration noted to rle, addressed by Dr. Horton. Rx for bentyl and lotrisone sent with patient. Patient to continue daily dressing changes. Patient to follow-up with Dr. Horton 05/20/2020.  5/21/20:  Fu with Dr. Horton.  Purulent drainage with odor coming from wounds.  Cx of mid abdominal wound obtained.  Rx given to patient for Amoxicillin 875/125mg and Norco 5/325mg.  Care tolerated well.  Fu with Dr. Horton in 1 week.  5/28/20:  Clifford assessed patient. Continue with same plan of care. Rx for Doxycycline sent lab. Patient instructed on Rx and voiced understanding.            Review of Systems   Constitutional: Negative.    HENT: Negative.    Eyes: Negative.    Respiratory: Negative.    Cardiovascular: Negative.    Gastrointestinal: Negative.    Genitourinary: Negative.    Musculoskeletal: Negative.    Skin: Negative.    Neurological: Negative.    Psychiatric/Behavioral: Negative.        Objective:      Physical Exam   Constitutional: She is oriented to person, place, and time. She appears well-developed and well-nourished.   HENT:   Head: Normocephalic.   Eyes: Pupils are equal, round, and reactive to light. Conjunctivae and EOM are normal.   Neck: Normal range of motion. Neck supple.   Cardiovascular: Normal rate, regular rhythm, normal heart sounds and intact distal pulses.   Pulmonary/Chest: Effort normal and breath sounds normal.   Abdominal: Soft. Bowel sounds are normal.   Musculoskeletal: Normal range of motion.   Neurological: She is alert and oriented to person, place, and time. She has normal reflexes.   Skin: Skin is warm and dry.       Assessment:       1. Abdominal wall abscess    2. Unspecified local infection of skin and subcutaneous tissue    3. Type 2 diabetes mellitus without complication, without long-term current use of insulin    4. S/P exploratory laparotomy    5. Colonic fistula           Wound 02/01/20 1353 Ulceration midline Abdomen #1 (Active)   02/01/20 1353    Pre-existing: Yes   Primary Wound Type: Ulceration   Side:    Orientation: midline   Location: Abdomen   Wound/PI Number (optional): #1   Ankle-Brachial Index:    Pulses:    Removal Indication and Assessment:    Wound Outcome:    (Retired) Wound Type:    (Retired) Wound Length (cm):    (Retired) Wound Width (cm):    (Retired) Depth (cm):    Wound Description (Comments):    Removal Indications:    Dressing Appearance Intact;Moist drainage 5/28/2020 12:00 PM    Drainage Amount Moderate 5/28/2020 12:00 PM   Drainage Characteristics/Odor Purulent 5/28/2020 12:00 PM   Appearance Pink 5/28/2020 12:00 PM   Tissue loss description Full thickness 5/28/2020 12:00 PM   Red (%), Wound Tissue Color 100 % 5/28/2020 12:00 PM   Periwound Area Intact 5/28/2020 12:00 PM   Wound Edges Defined;Open 5/28/2020 12:00 PM   Wound Length (cm) 0.2 cm 5/28/2020 12:00 PM   Wound Width (cm) 0.2 cm 5/28/2020 12:00 PM   Wound Depth (cm) 5 cm 5/28/2020 12:00 PM   Wound Volume (cm^3) 0.2 cm^3 5/28/2020 12:00 PM   Wound Surface Area (cm^2) 0.04 cm^2 5/28/2020 12:00 PM   Care Cleansed with:;Sterile normal saline 5/28/2020 12:00 PM   Dressing Applied 5/28/2020 12:00 PM   Periwound Care Skin barrier film applied;Topical treatment applied 5/28/2020 12:00 PM   Dressing Change Due 06/04/20 5/28/2020 12:00 PM            Wound 03/04/20 1023 Abdomen #2 (Active)   03/04/20 1023    Pre-existing:    Primary Wound Type:    Side: Left   Orientation:    Location: Abdomen   Wound/PI Number (optional): #2   Ankle-Brachial Index:    Pulses:    Removal Indication and Assessment:    Wound Outcome:    (Retired) Wound Type:    (Retired) Wound Length (cm):    (Retired) Wound Width (cm):    (Retired) Depth (cm):    Wound Description (Comments):    Removal Indications:    Dressing Appearance Intact;Moist drainage 5/28/2020 12:00 PM   Drainage Amount Moderate 5/28/2020 12:00 PM   Appearance Pink;Moist 5/28/2020 12:00 PM   Tissue loss description Full thickness 5/28/2020 12:00 PM   Red (%), Wound Tissue Color 100 % 5/28/2020 12:00 PM   Periwound Area Intact 5/28/2020 12:00 PM   Wound Edges Defined 5/28/2020 12:00 PM   Wound Length (cm) 0.3 cm 5/28/2020 12:00 PM   Wound Width (cm) 0.3 cm 5/28/2020 12:00 PM   Wound Depth (cm) 5.2 cm 5/28/2020 12:00 PM   Wound Volume (cm^3) 0.47 cm^3 5/28/2020 12:00 PM   Wound Surface Area (cm^2) 0.09 cm^2 5/28/2020 12:00 PM   Care Cleansed with:;Sterile normal saline 5/28/2020 12:00 PM   Dressing  Applied 5/28/2020 12:00 PM   Periwound Care Absorptive dressing applied;Skin barrier film applied 5/28/2020 12:00 PM   Dressing Change Due 06/04/20 5/28/2020 12:00 PM       Abdominal wound midline   Clean wound with normal saline  Lidocaine 2% gel or 4 % Topical solution: PRN in clinic  Danielle wound: Cavilon, betamethasone PRN itching  Primary dressing: Apply gentamycin to plain nu gauze and lightly pack into wound bed   Secondary dressing: Cover with aquacel extra, 4 x 4 gauze, small ABD or border dressing and secure with mefix tape  Other:  Culture obtained    Left Upper Quadrant abdomen   Cleanse with normal saline  Lidocaine 2% gel or 4 % Topical solution: PRN in clinic  Periwound: Cavilon, betamethasone PRN itching  Primary dressing: Apply gentamicin ointment to plain packing and gently pack to wound depth  Secondary dressing: Cover with aquacel extra, 4x4 gauze, small abd pad or border dressing, and secure with mefix tape     Frequency: Daily per patient    Follow up in 1 week 06/04/2020 with Dr. Horton  Other orders: Rx for doxycycline sent to pharmacy    Wound care supplies for home(daily) dressing changes    Dr Horton assessed patient. Continue with same plan of care. Rx for Doxycycline sent lab. Patient instructed on Rx and voiced understanding.    Plan:                Follow up in about 1 week (around 6/4/2020).

## 2020-06-03 ENCOUNTER — HOSPITAL ENCOUNTER (OUTPATIENT)
Dept: WOUND CARE | Facility: HOSPITAL | Age: 70
Discharge: HOME | End: 2020-06-03
Attending: SURGERY
Payer: MEDICARE

## 2020-06-03 VITALS
SYSTOLIC BLOOD PRESSURE: 147 MMHG | HEART RATE: 84 BPM | DIASTOLIC BLOOD PRESSURE: 76 MMHG | TEMPERATURE: 98 F | WEIGHT: 157 LBS | BODY MASS INDEX: 26.16 KG/M2 | HEIGHT: 65 IN

## 2020-06-03 DIAGNOSIS — K56.609 SBO (SMALL BOWEL OBSTRUCTION): ICD-10-CM

## 2020-06-03 DIAGNOSIS — L02.211 ABDOMINAL WALL ABSCESS: Primary | ICD-10-CM

## 2020-06-03 DIAGNOSIS — E11.9 TYPE 2 DIABETES MELLITUS WITHOUT COMPLICATION, WITHOUT LONG-TERM CURRENT USE OF INSULIN: ICD-10-CM

## 2020-06-03 DIAGNOSIS — R10.32 LLQ PAIN: ICD-10-CM

## 2020-06-03 DIAGNOSIS — L08.9 LOCAL INFECTION OF SKIN AND SUBCUTANEOUS TISSUE: ICD-10-CM

## 2020-06-03 DIAGNOSIS — K63.2 COLOCUTANEOUS FISTULA: ICD-10-CM

## 2020-06-03 PROCEDURE — 99213 OFFICE O/P EST LOW 20 MIN: CPT

## 2020-06-03 NOTE — PROGRESS NOTES
"Subjective:       Patient ID: Azucena Morrison is a 69 y.o. female.    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 Pt denies any fevers or chills but states she feels nauseous at times. Returned to [place] June 28, from [place], reports much trouble with abdominal wound while in [place].  7/6/17-- Patient scheduled for surgical drainage of wound Tuesday 7/11/17DB  7/19/17-- Patient post op clinic visit.  8/16/17: CT of abdomen and pelvis done 8/9/17 due to suspected fistula  8/23/17-- U/S of abdomen done today.  12/13/17 Wound vac with 20cc drainage in clinic today.  1/10/18: on PO abx  1/24/18: Fistulagram ordered per Dr. Horton. Patient still on PO abx  02/21/18 Patient is not on antibiotics at this time. BS fasting 135 per patient report this am.  03/07/18 Culture of Anterior Abdominal Wound is positive. No antibiotics at this time.  fasting per patient report.    03/21/18 Patient c/o nausea along with abdominal tenderness near abd midline wound. Patient states that pain level is 6 and that she passed two sero-sanguineous clots from wound. Patient is afebrile this am.  3/28/18: patient continue antibiotics and reports that pain is less than last weeks clinic visit. Drainage has decreased as well  04/04/18 Patient states that abdominal pain is "pressure" sensation and that when she pushes down on her abdomen on either side of abdominal wound she feels like she has to urinate. Patient reports pain level of 3 this am.  fasting this am per patient report.   5/2/18: Patient had Abdomina Toribio today before wound care. She had discontinued Bactrim PO per Dr. Horton's recommendation and culture results and is now taking Amoxicillin PO  6/20/18: Pt reports itching to wound and periwound   6/27/18: patient reports no new complaints with regards to her wound or " abdomen, wound continues to decrease in size and drainage  8/1/18: Pt reports increased pain to abdomen with nausea and emesis since thursday 7/26/18, denies any fevers, patient did no go to ER as instructed by home health nurse on Sat. 7/28/18.  8/29/18: Patient admitted to hospital 8/2 for abdominal wound complications. Surgery for abdominal abscess per Dr. Horton on 8/3. Patient placed on IV antibiotics and discharged home on 8/18 with [facility]  and PICC line to [facility]. Patient currently on IV ertapenem. IV medication sent to patients home per [facility].   9/5/18: IV Ertapenem to continue 1 week. Culture sent to lab. [facility]  sent orders. PICC line intact to [facility].  9/12/18 Antibiotics completed. Culture results pending. 1 deep stitch remaining.  9/12/18: Culture positive for E. Coli, patient to continue Ertapenem IV antibiotics x 2weeks. [facility] notified  9/26/18: F/U abdominal wound, wound continues to improve. Patient will complete IV antibiotics today  10/3/18: patient c/o diarrhea for 2 days and nausea with some vomiting. Labs and stool culture ordered. IV antibiotics discontinued today  10/10/2018 patient will start on IV antibiotic Invaz IV once a day, pending PICC line placement, order placed today  for PICC per Dr. Horton.  10/24/18: patient on IV antibiotics, tolerating well. Continue for 1 week  11/7/2018: IV Antibiotic discontinued.  11/8/2018: PICC line to left upper arm discontinued by Home Health.  11/21/2018 F/u for abdominal wall fistula , c/o nausea  No vomiting , no fever  12/5/2018: F/u for abdominal wall fisula, c/o gas, Dr. Horton will order Bentyl. Surgery schedule for January 2019.  12/12/2018: F/u for abdominal wall fistula, c/o abdominal pain. Dr. Horton ordered Norco 5/325mg tab 1 PO every 4 hours as needed for pain and referred patient for x-ray of abdomen after clinic today.  12/19/2018: F/u for abdominal wall fisula, c/o nausea, Dr. Horton re ordered  Ondansetron (Zofran) 8mg one tab by mouth every eight hours as needed for nausea. No changes in wound condition from last visit noted in clinic today.  12/26/2018: F/u abdominal wall fistula drainage, no c/o at this time. Denies any abdominal pain or nausea. Dr. Horton recommends surgery first week of January 2019 for abdominal fistula treatment and possible colostomy placement, patient agree.  01/02/2019: F/u abdominal wall fistula. Dressing with large amount of drainage, malodorous, Dr. Horton is scheduling surgery for third week of January, 2019.  1/16/19: F/U Dr. Horton today in clinic, surgery scheduled for next Friday 1/25/19 with Dr. Horton.     01/23/2019: Presents to wound care clinic for f/u abdominal wound care tx. Surgery scheduled with Dr. Horton for Friday 01/25/2019. Dr. Horton explained Mrs. Morrison surgery procedure including possible complications, Nurse  (Croatian) present, patient verbalizes understanding of procedure including possible complications, all questions from patient were answered by Dr. Horton including blood sugar and blood pressure medications per patient's concerns. Consent for surgery and blood transfusion signed by patient, Dr. Horton and nurse witnessed.  Abdominal wound cultures collected per Dr. Horton, cultures sent to lab/micro pending results. Dr. Horton prescribed the following orders: fleet enema for Thursday 1/24/2019, clear liquid diet and not to eat after midnight all for 1/24/2019. Start taking Neomycin and erythromycin by mouth three times a day (1/24/2019) and dulcolax one tab by mouth twice a day, Mrs. Morrison voices understanding.     01/30/2019: F/U wound care treatment with Dr. Horton. Per pt, primary physician Dr. Pj Monae ordered for her to take potassium pills for three days, last day today and scheduled for lab work at primary physician clinic on 1/31/2019. Per pt. Feeling better, no more abdominal pain (went to ER 1/24/2019 and  "discharged 1/25/2019 c/o abd pain.). Dr. Horton awaiting on primary clearance to re-schedule surgery, per pt. She will call wound care clinic and Dr. Horton to make aware of clearance day. Continue with same orders for dressing change for Dr. Horton. Mrs. Morrison requesting gentamicin refill.     02/06/2019: F/u with Dr. Horton for wound care treatment. Mrs. Morrison brought to clinic a clearance for surgery by Dr. Pj Sanches dated 02/06/2019 "Hyperkalemia-Resolved K 4.8 2/1/2019, labs , Cr. 1.05, H/H 10/30. No contraindication to surgery, tight control of BS, Hydration." A copy of EKG (1/24/2019) and lab work from [facility] collected 1/31/2019, reported results 2/1/2019. Dr. Horton scheduled surgery for 02/22/2019, consent were signed on 01/23/2019, all questions were answered by Dr. Horton, this nurse  (Irish) present. Education provided to patient on the importance of having a clear liquid diet the day before surgery 02/21/2019 as per Dr. Horton, new medications and preop preparation before surgery, verbalizes understanding. Dr. Horton ordered Norco 5/325 mg PO for pain, Dulcolax two tabs by mouth to take on 02/21/2019, Soap suds enema (SSE) on Thursday 02/21/2019, Golytely by mouth 2 liters on 2/21/2019, Erythromycin 500 mg one tab by mouth three times a day and Neomycin 1 gm by mouth three times a day.     2/13/2019: Presents to wound care clinic for a f/u with Dr. Horton for wound care treatment. No new orders in wound dressing change, reviewed preop orders with Mrs. Morrison per Dr. Horton, all questions answered. Surgery scheduled for 02/22/2019.  2/20/19: Continues wound care a previously ordered.  Depth measured per Dr. Horton 8cm.  Preop orders reviewed with patient who verbalized understanding.  Surgery scheduled for Fri 2/22/19.  Rx given to patient for Norco and Zofran.    03/06/19: Patient admitted to [facility] on 02/22/19 for exploratory laparotomy of " "abdomen per Dr. Horton. Patient discharged on 02/28/19 with home health and PICC line with IV antibiotics. Staples removed today in clinic. Patient denies any fever, chills, n&v; however, she states that she has "low energy." Continued with wound care as ordered.     3/13/2019: F/U with Dr. Horton for wound care treatment. Continue with same wound dressing change orders as per Dr. Horton, orders followed. Home Health to continue wound dressing change and lab draw for CBC weekly; continue IV Ertapenem/Invaz 1 gm IV daily as ordered.     3/20/2019: Clinic visit with Dr. Horton for abdominal abscess treatment. Presents with moderate draining from abdominal wound; wound size decreasing per measurement. Per Dr. Horton, apply one-piece system ostomy bag to abdominal wound for drain collection, may drain bag when it fills and may change every other day, continue applying gentamicin to wound bed before applying ostomy bag. Continue IV Invaz until completion. If ostomy bag not effective for draining collection, may return to previous orders for wound dressing change. Orders followed. Education provided to Mrs. Morrison on handwashing and ostomy care techniques, voices and demonstrates understanding.   3/27/19: Follow up with Dr. Horton today in clinic for abdominal abscess, moderated tanish yellow drainge present on dressing.  Wound slowly improving, patient refused one-piece ostomy bag stated " no it's too messy." requested to return to previous dressing prior to ostomy bag placement- iodoform gauze, aquacel extra, sm abd, and mefix tape.  Silver nitrate x1 per Dr. Horton today in clinic. Rx given to patient for PO Zofran.     04/03/2019: F/u clinic visit with Dr. Horton. Abdominal wound improving in size, picture on file. Wound cultures from abdominal abscess collected and sent to lab/micro, pending results. Mrs. Morrison states going out of the country ([place]) at the end of April and plans to stay there for " approximately two months. Dr. Horton ordered IV antibiotic for two more weeks and new wound care dressing, orders followed.     4/9/2019: Clinic visit with Dr. Horton.  Per Dr. Horton, wound deep measurement increased, draining present. Wound cultures from abdominal wound taken and sent to lab/micro, pending results. C/o abdominal pain and itching around wound, Rx for betamethasone cream 01.% and Norco 5/325mg give in clinic by Dr. Horton. Wound care dressing orders followed. Continue IV antibiotic as previously ordered.  04/17/19: F/u for non-healing wound to abdomen. Culture report negative. Dr. Horton ordered 1 more week of IV antibiotics. Continue with topical wound care as ordered.     4/24/2019: Clinic visit with Dr. Horton for abdominal wound treatment. Wound improvement present, pictures on file. Last IV antibiotic today 4/24/2019, [facility] to discontinue PICC line from left upper arm on 4/25/2019. Per Lyubov Prince, going to [place] on Friday 4/26/2019 and out of the country ([place]) on Monday 4/29/2019. This nurse spoke to Shaunna at [employer] (IV antibiotic delivery company) and informed last dose of antibiotic is today. Dr. Horton ordered Hydrocodone-acetaminophen (Norco) 5-325 mg (32 tabs), Bentyl 10 mg (120 capsules) and Gentamycin ointment. Education provided on the importance of eating food that contains iron (to prevent anemia) as well as eating meat and taking her blood pressure medications (blood pressure medication) on time, voices understanding. All questions answered by Dr. Horton. [facility] outpatient confirmed Betamethasone cream ordered on 4/10/2019 is ready for . Instructions and education provided to Mrs. Morrison on abdominal wound dressing changes, hand washing techniques and medication use, effects and side effects, voices and demonstrates understanding. Discharged home on stable conditions, states will give wound care clinic a call when she is back from  [place] in few months from now.     7/17/19: Readmit today to wound care clinic.  Patient was recently out of the country visiting family in [place].  Patient complains of pain and nausea as on prior visits.  Dr. Horton seen patient today discussed with patient again placing colostomy patient refused.  Culture of wound taken, Dr. Horton restarted patient on zofran po for nausea, and Norco for pain. [facility] restarted today in clinic on Mondays and Fridays and prn. Orders given to gently pack wound with aquacel ag rope, cover with aquacel extra, 4x4 gauze, small abd pad and mefix tape change dressing ever other day by [facility] and Prn per patient.     7/24/2019: Clinic visit with Dr. Horton. Continue with dressing change as ordered. Wound cultures reviewed with patient, lab work ordered by Dr. Horton, no fever present or reported at this time. C/o decrease appetite, medication periactin prescribed. Requesting needles for insulin pen, order prescribed by Dr. Horton.      7/31/2019: F/U clinic visit with Dr. Horton. C/o of excruciated back pain 10/10, not feeling well, lot of gas and left upper quadrant discomfort. New orders prescribed: Augmentin 875-125 mg by mouth twice a day. Bentyl 10 mg one tab by mouth 4 times a day. Tramadol 50 mg one tab by mouth every 6 hours as needed for pain. Abdomen ultrasound. Wound care dressing completed as ordered.     8/7/2019: Clinic visit with Dr. Horton, denies any pain at this time. Wound care dressing done as ordered, no changes in wound size from last visit noted. Continue taking oral antiiotic as ordered, pending abdominal ultrasound, scheduled for 8/13/2019.     8/14/2019: F/U clinic visit with Dr. Horton. Admitted and discharged from emergency department this morning with abdominal pain, CT and ultrasound in filed. Dr. Horton performed a small drainage from the abdominal wound, moderate amount of creamy, serosanguineous fluid from abdominal wound  present. Iodoform gauze packed into her wound, dressing changed as ordered. Wound cultures collected and sent to lab/micro, pending results. Rx for Norco 5/325 mg one tab PO every 4 hours PRN as needed # 24 given to pt by Dr. Horton.      8/21/2019: Clinic visit with Dr. Horton. Wound dressing changed as ordered.      11/6/19: Follow up appt with Dr Horton. Wounds improving slowly. Continues to take antibiotics (Augmentin 875-125mg). No complaints voiced. Follow up in 1 week.  11/13/19:  F/U clinic visit with Dr. Horton.  Wound depth per Dr. Horton is 8.5 cm.  Continue PT Augmentin.  Patient is co left sided abdominal pain.  Abdominal US and labs ordered.  Wound care tolerated well.  Fu with Dr. Horton in 1 week.      11/20/19:Dr Horton assessed patient. Silver nitrate applied to wound. Continuing present plan of care. Patient is scheduled to have abdominal ultra sound Friday 11/22/19. Continues to take Augmentin.  F/U in 1 week.    11/27/19: Follow up in clinic with Dr. Horton. Wound depth 8.5cm, silver nitrate x 3 per Dr. Horton, patient continues taking po Augmentin, c/o left sided abdominal pain, ultrasound results discussed with patient, Dr. Horton will watch for now.  Follow up 1 week 12/4/19.      12/04/19: F/u Dr. Horton. Continue with current wound care treatment. Rx given for Bentyl 10 mg. Patient to follow up in  1 week at wound clinic  12/11/19: F/u with . Continue with current wound care treatment. Rx given for Lotrisone cream to be applied daily per patient to right lower leg rash. Patient to follow up in  1 week at wound clinic     12/18/19: F/u with Dr. Horton. Patient c/o pain 9 out of 10  to abdomen LLQ. Area of pain is warm to touch, pink, and a small nodule can be felt. Patient states that symptoms began about 4 days ago. Stat CT scan ordered. Patient will have CT done tomorrow due to not fasting this morning. Instructed patient to fast before CT scan. Continue with current  wound care treatment      01/08/2020: F/u with Dr. Horton. Patient had I&D on 12/22/19 for abscess to abdomen. New wound care orders given. Patient continues on po abx and home health for wound care. Follow up in 1 week at wound care clinic     01/15/20: F/u with Dr. Horton. Patient c/o mild discomfort at wound to left abdomen. Patient denies any fever, chills, n&v, diarrhea, and/or constipation. Will continue to monitor. Continue with current wound care treatment and follow up in  Wound clinic in 1 week.  1/29/2020: Fu today in wound care clinic with .  Wounds improving slowly.  Silver nitrate x 1 to each wound per Dr. Horton patient tolerated well.  RX give to patient today for Amoxicillin po, Bentyl Po and Norco 5/325mg Po today in clinic.  Fu 1 week 2/5/2020 with Dr. Horton.  2/5/20: F/U with Dr. Horton. LUQ site resolved. Midline site same. Cont. POC.  2/12/20: F/U with Dr. Horton. LUQ site and midline probed by Dr. Horton. Cont. Augmentin. Rx for Norco provided. Cont. POC.  2/19/20: F/U with Dr. Horton. Afebrile. No s/s of infection. Culture done of midline today. Rx for Augmentin and Zofran sent electronically to pharmacy. Return in 1 week.  2/26/2020: FU MD visit. Cultures reviewed per MD, po abx changed from augmentin to tetracycline sent to pharmacy, patient verbalized understanding. Patient states she will be traveling to [place] on March 29 for 3 months.      03/04/2020: F/u with Dr. Horton. Patient c/o pain at wound site located on left abdomen, lethargy, and no appetite for the past 3 days . Culture taken and patient instructed to stop po tetracycline while awaiting culture results. New wound care orders given for left abdominal wound. Orders routed to home health. Patient to follow up in 1 week at wound clinic.  3/11/20: F/U with Dr. Horton. No c/o for pain today. Stop tetracycline. Rx for Augmentin sent to pharmacy. Cont. POC. Return in 1 week. HH orders routed.  3/18/20: F/U  with Dr. Horton. No c/o of pain at this time. Rx for Bentyl and Norco provided. Continue Augmentin. Return in 1 week. HH orders routed.     04/01/2020: F/u with Dr. Horton. Wound showing improvement. Patient states that she has not had home health nurse come out in 2 weeks. Patient also states that she would like us to order supplies so she can begin doing her own wound care. Supplies ordered. Patient instructed on daily wound care treatment and verbalized understanding. Continue po Augmentin BID  04/08/2020: F/u with Dr. Horton. Wound improving. Continue with current wound care treatment. Patient given rx for norco 5/325 mg and zofran 8 mg. No new complaints per patient today   04/22/20  F/U with Dr. Horton.  Wound unchanged.  Pack wounds with plain nu gauze with gentamicin ointment.  Patient given RX for Norco 5/325 mg, Zofran 8 mg, Amoxiciilin .  Patient to follow  Up with Dr. Horton in 1 week.  Wound care supplies ordered.  Review of Systems   04/29/2020: F/u with Dr. Horton. Wound probed with cotton-tip applicator per Dr. Horton to assist with fluid drainage. Patient tolerated well. Continue with current wound care treatment and follow up in 1 week at wound clinic  05/06/2020: F/u with Dr. Horton. Wound remains stable. Patient experiencing increased pain. CT scan of abdomen ordered by PCP. Patient got CT scan on Monday, and Dr. Horton reviewed results with patient. Continue current wound care treatment and follow up in 1 week  05/13/2020- pt follow-up with Dr. Horton. Superficial skin discoloration noted to rle, addressed by Dr. Horton. Rx for bentyl and lotrisone sent with patient. Patient to continue daily dressing changes. Patient to follow-up with Dr. Horton 05/20/2020.  5/21/20:  Fu with Dr. Horton.  Purulent drainage with odor coming from wounds.  Cx of mid abdominal wound obtained.  Rx given to patient for Amoxicillin 875/125mg and Norco 5/325mg.  Care tolerated well.  Fu with Dr. Horton in  1 week.  5/28/20:Dr Horton assessed patient. Continue with same plan of care. Rx for Doxycycline sent lab. Patient instructed on Rx and voiced understanding.        06/03/2020: F/u with Dr. Horton. Continue po doxycycline and current topical wound care treatment daily. Follow up in 1 week at wound care clinic    Review of Systems   Constitutional: Negative.    HENT: Negative.    Eyes: Negative.    Respiratory: Negative.    Cardiovascular: Negative.    Gastrointestinal: Negative.    Genitourinary: Negative.    Musculoskeletal: Negative.    Skin: Negative.    Neurological: Negative.    Psychiatric/Behavioral: Negative.        Objective:      Physical Exam   Constitutional: She is oriented to person, place, and time. She appears well-developed and well-nourished.   HENT:   Head: Normocephalic.   Eyes: Pupils are equal, round, and reactive to light. Conjunctivae and EOM are normal.   Neck: Normal range of motion. Neck supple.   Cardiovascular: Normal rate, regular rhythm, normal heart sounds and intact distal pulses.   Pulmonary/Chest: Effort normal and breath sounds normal.   Abdominal: Soft. Bowel sounds are normal.   Musculoskeletal: Normal range of motion.   Neurological: She is alert and oriented to person, place, and time. She has normal reflexes.   Skin: Skin is warm and dry.       Assessment:       1. Abdominal wall abscess    2. Type 2 diabetes mellitus without complication, without long-term current use of insulin           Wound 02/01/20 1353 Ulceration midline Abdomen #1 (Active)   02/01/20 1353    Pre-existing: Yes   Primary Wound Type: Ulceration   Side:    Orientation: midline   Location: Abdomen   Wound/PI Number (optional): #1   Ankle-Brachial Index:    Pulses:    Removal Indication and Assessment:    Wound Outcome:    (Retired) Wound Type:    (Retired) Wound Length (cm):    (Retired) Wound Width (cm):    (Retired) Depth (cm):    Wound Description (Comments):    Removal Indications:    Wound Image    6/3/2020  8:00 AM   Dressing Appearance Moist drainage 6/3/2020  8:00 AM   Drainage Amount Moderate 6/3/2020  8:00 AM   Drainage Characteristics/Odor Yellow 6/3/2020  8:00 AM   Appearance Red;Moist 6/3/2020  8:00 AM   Tissue loss description Full thickness 6/3/2020  8:00 AM   Red (%), Wound Tissue Color 100 % 6/3/2020  8:00 AM   Periwound Area Intact 6/3/2020  8:00 AM   Wound Edges Open 6/3/2020  8:00 AM   Wound Length (cm) 0.3 cm 6/3/2020  8:00 AM   Wound Width (cm) 0.3 cm 6/3/2020  8:00 AM   Wound Depth (cm) 4.2 cm 6/3/2020  8:00 AM   Wound Volume (cm^3) 0.38 cm^3 6/3/2020  8:00 AM   Wound Surface Area (cm^2) 0.09 cm^2 6/3/2020  8:00 AM   Care Cleansed with:;Sterile normal saline 6/3/2020  8:00 AM   Dressing Applied;Other (see comments);Calcium alginate;Gauze;Abd pad 6/3/2020  8:00 AM   Periwound Care Skin barrier film applied 6/3/2020  8:00 AM   Dressing Change Due 06/04/20 6/3/2020  8:00 AM            Wound 03/04/20 1023 Abdomen #2 (Active)   03/04/20 1023    Pre-existing:    Primary Wound Type:    Side: Left   Orientation:    Location: Abdomen   Wound/PI Number (optional): #2   Ankle-Brachial Index:    Pulses:    Removal Indication and Assessment:    Wound Outcome:    (Retired) Wound Type:    (Retired) Wound Length (cm):    (Retired) Wound Width (cm):    (Retired) Depth (cm):    Wound Description (Comments):    Removal Indications:    Wound Image   6/3/2020  8:00 AM   Dressing Appearance Moist drainage 6/3/2020  8:00 AM   Drainage Amount Moderate 6/3/2020  8:00 AM   Drainage Characteristics/Odor Sanguineous;Yellow 6/3/2020  8:00 AM   Appearance Red;Moist 6/3/2020  8:00 AM   Tissue loss description Full thickness 6/3/2020  8:00 AM   Red (%), Wound Tissue Color 100 % 6/3/2020  8:00 AM   Periwound Area Intact 6/3/2020  8:00 AM   Wound Edges Open 6/3/2020  8:00 AM   Wound Length (cm) 0.3 cm 6/3/2020  8:00 AM   Wound Width (cm) 0.4 cm 6/3/2020  8:00 AM   Wound Depth (cm) 7 cm 6/3/2020  8:00 AM   Wound Volume (cm^3)  0.84 cm^3 6/3/2020  8:00 AM   Wound Surface Area (cm^2) 0.12 cm^2 6/3/2020  8:00 AM   Care Cleansed with:;Sterile normal saline 6/3/2020  8:00 AM   Dressing Applied;Other (see comments);Calcium alginate;Gauze;Abd pad 6/3/2020  8:00 AM   Periwound Care Skin barrier film applied 6/3/2020  8:00 AM   Dressing Change Due 06/04/20 6/3/2020  8:00 AM       Abdominal wound midline   Clean wound with normal saline  Lidocaine 2% gel or 4 % Topical solution: PRN in clinic  Danielle wound: [place], betamethasone PRN itching  Primary dressing: Apply gentamycin to plain nu gauze and lightly pack into wound bed   Secondary dressing: Cover with aquacel extra, 4 x 4 gauze, small ABD or border dressing and secure with mefix tape  Other:  Culture obtained    Left Upper Quadrant abdomen   Cleanse with normal saline  Lidocaine 2% gel or 4 % Topical solution: PRN in clinic  Periwound: Cavilon, betamethasone PRN itching  Primary dressing: Apply gentamicin ointment to plain packing and gently pack to wound depth  Secondary dressing: Cover with aquacel extra, 4x4 gauze, small abd pad or border dressing, and secure with mefix tape     Frequency: Daily per patient    Follow up in 1 week 06/10/2020 with Dr. Horton  Other orders: Continue po doxycycline       Plan:                Follow up in about 1 week (around 6/10/2020) for Dr. Horton.

## 2020-06-10 ENCOUNTER — HOSPITAL ENCOUNTER (OUTPATIENT)
Dept: WOUND CARE | Facility: HOSPITAL | Age: 70
Discharge: HOME | End: 2020-06-10
Attending: SURGERY
Payer: MEDICARE

## 2020-06-10 VITALS
DIASTOLIC BLOOD PRESSURE: 74 MMHG | HEIGHT: 65 IN | TEMPERATURE: 97 F | BODY MASS INDEX: 26.16 KG/M2 | WEIGHT: 157 LBS | SYSTOLIC BLOOD PRESSURE: 152 MMHG | HEART RATE: 77 BPM

## 2020-06-10 DIAGNOSIS — L02.211 ABDOMINAL WALL ABSCESS: Primary | ICD-10-CM

## 2020-06-10 DIAGNOSIS — E11.9 TYPE 2 DIABETES MELLITUS WITHOUT COMPLICATION, WITHOUT LONG-TERM CURRENT USE OF INSULIN: ICD-10-CM

## 2020-06-10 DIAGNOSIS — I10 ESSENTIAL HYPERTENSION: ICD-10-CM

## 2020-06-10 DIAGNOSIS — L08.9 LOCAL INFECTION OF SKIN AND SUBCUTANEOUS TISSUE: ICD-10-CM

## 2020-06-10 DIAGNOSIS — K63.2 COLOCUTANEOUS FISTULA: ICD-10-CM

## 2020-06-10 PROCEDURE — 99213 OFFICE O/P EST LOW 20 MIN: CPT

## 2020-06-10 NOTE — PROGRESS NOTES
"Subjective:       Patient ID: Azucena Morrison is a 69 y.o. female.    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 Pt denies any fevers or chills but states she feels nauseous at times. Returned to [place] June 28, from [place], reports much trouble with abdominal wound while in [place].  7/6/17-- Patient scheduled for surgical drainage of wound Tuesday 7/11/17DB  7/19/17-- Patient post op clinic visit.  8/16/17: CT of abdomen and pelvis done 8/9/17 due to suspected fistula  8/23/17-- U/S of abdomen done today.  12/13/17 Wound vac with 20cc drainage in clinic today.  1/10/18: on PO abx  1/24/18: Fistulagram ordered per Dr. Horton. Patient still on PO abx  02/21/18 Patient is not on antibiotics at this time. BS fasting 135 per patient report this am.  03/07/18 Culture of Anterior Abdominal Wound is positive. No antibiotics at this time.  fasting per patient report.    03/21/18 Patient c/o nausea along with abdominal tenderness near abd midline wound. Patient states that pain level is 6 and that she passed two sero-sanguineous clots from wound. Patient is afebrile this am.  3/28/18: patient continue antibiotics and reports that pain is less than last weeks clinic visit. Drainage has decreased as well  04/04/18 Patient states that abdominal pain is "pressure" sensation and that when she pushes down on her abdomen on either side of abdominal wound she feels like she has to urinate. Patient reports pain level of 3 this am.  fasting this am per patient report.   5/2/18: Patient had Abdomina Toribio today before wound care. She had discontinued Bactrim PO per Dr. Horton's recommendation and culture results and is now taking Amoxicillin PO  6/20/18: Pt reports itching to wound and periwound   6/27/18: patient reports no new complaints with regards to her wound or " abdomen, wound continues to decrease in size and drainage  8/1/18: Pt reports increased pain to abdomen with nausea and emesis since thursday 7/26/18, denies any fevers, patient did no go to ER as instructed by home health nurse on Sat. 7/28/18.  8/29/18: Patient admitted to hospital 8/2 for abdominal wound complications. Surgery for abdominal abscess per Dr. Horton on 8/3. Patient placed on IV antibiotics and discharged home on 8/18 with [facility]  and PICC line to [facility]. Patient currently on IV ertapenem. IV medication sent to patients home per [facility].   9/5/18: IV Ertapenem to continue 1 week. Culture sent to lab. [facility]  sent orders. PICC line intact to [facility].  9/12/18 Antibiotics completed. Culture results pending. 1 deep stitch remaining.  9/12/18: Culture positive for E. Coli, patient to continue Ertapenem IV antibiotics x 2weeks. [facility] notified  9/26/18: F/U abdominal wound, wound continues to improve. Patient will complete IV antibiotics today  10/3/18: patient c/o diarrhea for 2 days and nausea with some vomiting. Labs and stool culture ordered. IV antibiotics discontinued today  10/10/2018 patient will start on IV antibiotic Invaz IV once a day, pending PICC line placement, order placed today  for PICC per Dr. Horton.  10/24/18: patient on IV antibiotics, tolerating well. Continue for 1 week  11/7/2018: IV Antibiotic discontinued.  11/8/2018: PICC line to left upper arm discontinued by Home Health.  11/21/2018 F/u for abdominal wall fistula , c/o nausea  No vomiting , no fever  12/5/2018: F/u for abdominal wall fisula, c/o gas, Dr. Horton will order Bentyl. Surgery schedule for January 2019.  12/12/2018: F/u for abdominal wall fistula, c/o abdominal pain. Dr. Horton ordered Norco 5/325mg tab 1 PO every 4 hours as needed for pain and referred patient for x-ray of abdomen after clinic today.  12/19/2018: F/u for abdominal wall fisula, c/o nausea, Dr. Horton re ordered  Ondansetron (Zofran) 8mg one tab by mouth every eight hours as needed for nausea. No changes in wound condition from last visit noted in clinic today.  12/26/2018: F/u abdominal wall fistula drainage, no c/o at this time. Denies any abdominal pain or nausea. Dr. Horton recommends surgery first week of January 2019 for abdominal fistula treatment and possible colostomy placement, patient agree.  01/02/2019: F/u abdominal wall fistula. Dressing with large amount of drainage, malodorous, Dr. Horton is scheduling surgery for third week of January, 2019.  1/16/19: F/U Dr. Horton today in clinic, surgery scheduled for next Friday 1/25/19 with Dr. Horton.     01/23/2019: Presents to wound care clinic for f/u abdominal wound care tx. Surgery scheduled with Dr. Horton for Friday 01/25/2019. Dr. Horton explained Mrs. Morrison surgery procedure including possible complications, Nurse  (Kazakh) present, patient verbalizes understanding of procedure including possible complications, all questions from patient were answered by Dr. Horton including blood sugar and blood pressure medications per patient's concerns. Consent for surgery and blood transfusion signed by patient, Dr. Horton and nurse witnessed.  Abdominal wound cultures collected per Dr. Horton, cultures sent to lab/micro pending results. Dr. Horton prescribed the following orders: fleet enema for Thursday 1/24/2019, clear liquid diet and not to eat after midnight all for 1/24/2019. Start taking Neomycin and erythromycin by mouth three times a day (1/24/2019) and dulcolax one tab by mouth twice a day, Mrs. Morrison voices understanding.     01/30/2019: F/U wound care treatment with Dr. Horton. Per pt, primary physician Dr. Pj Monae ordered for her to take potassium pills for three days, last day today and scheduled for lab work at primary physician clinic on 1/31/2019. Per pt. Feeling better, no more abdominal pain (went to ER 1/24/2019 and  "discharged 1/25/2019 c/o abd pain.). Dr. Horton awaiting on primary clearance to re-schedule surgery, per pt. She will call wound care clinic and Dr. Horton to make aware of clearance day. Continue with same orders for dressing change for Dr. Horton. Mrs. Morrison requesting gentamicin refill.     02/06/2019: F/u with Dr. Horton for wound care treatment. Mrs. Morrison brought to clinic a clearance for surgery by Dr. Pj Sanches dated 02/06/2019 "Hyperkalemia-Resolved K 4.8 2/1/2019, labs , Cr. 1.05, H/H 10/30. No contraindication to surgery, tight control of BS, Hydration." A copy of EKG (1/24/2019) and lab work from [facility] collected 1/31/2019, reported results 2/1/2019. Dr. Horton scheduled surgery for 02/22/2019, consent were signed on 01/23/2019, all questions were answered by Dr. Horton, this nurse  (Albanian) present. Education provided to patient on the importance of having a clear liquid diet the day before surgery 02/21/2019 as per Dr. Horton, new medications and preop preparation before surgery, verbalizes understanding. Dr. Horton ordered Norco 5/325 mg PO for pain, Dulcolax two tabs by mouth to take on 02/21/2019, Soap suds enema (SSE) on Thursday 02/21/2019, Golytely by mouth 2 liters on 2/21/2019, Erythromycin 500 mg one tab by mouth three times a day and Neomycin 1 gm by mouth three times a day.     2/13/2019: Presents to wound care clinic for a f/u with Dr. Horton for wound care treatment. No new orders in wound dressing change, reviewed preop orders with Mrs. Morrison per Dr. Horton, all questions answered. Surgery scheduled for 02/22/2019.  2/20/19: Continues wound care a previously ordered.  Depth measured per Dr. Horton 8cm.  Preop orders reviewed with patient who verbalized understanding.  Surgery scheduled for Fri 2/22/19.  Rx given to patient for Norco and Zofran.    03/06/19: Patient admitted to [facility] on 02/22/19 for exploratory laparotomy of " "abdomen per Dr. Horton. Patient discharged on 02/28/19 with home health and PICC line with IV antibiotics. Staples removed today in clinic. Patient denies any fever, chills, n&v; however, she states that she has "low energy." Continued with wound care as ordered.     3/13/2019: F/U with Dr. Horton for wound care treatment. Continue with same wound dressing change orders as per Dr. Horton, orders followed. Home Health to continue wound dressing change and lab draw for CBC weekly; continue IV Ertapenem/Invaz 1 gm IV daily as ordered.     3/20/2019: Clinic visit with Dr. Horton for abdominal abscess treatment. Presents with moderate draining from abdominal wound; wound size decreasing per measurement. Per Dr. Horton, apply one-piece system ostomy bag to abdominal wound for drain collection, may drain bag when it fills and may change every other day, continue applying gentamicin to wound bed before applying ostomy bag. Continue IV Invaz until completion. If ostomy bag not effective for draining collection, may return to previous orders for wound dressing change. Orders followed. Education provided to Mrs. Morrison on handwashing and ostomy care techniques, voices and demonstrates understanding.   3/27/19: Follow up with Dr. Horton today in clinic for abdominal abscess, moderated tanish yellow drainge present on dressing.  Wound slowly improving, patient refused one-piece ostomy bag stated " no it's too messy." requested to return to previous dressing prior to ostomy bag placement- iodoform gauze, aquacel extra, sm abd, and mefix tape.  Silver nitrate x1 per Dr. Horton today in clinic. Rx given to patient for PO Zofran.     04/03/2019: F/u clinic visit with Dr. Horton. Abdominal wound improving in size, picture on file. Wound cultures from abdominal abscess collected and sent to lab/micro, pending results. Mrs. Morrison states going out of the country ([place]) at the end of April and plans to stay there for " approximately two months. Dr. Horton ordered IV antibiotic for two more weeks and new wound care dressing, orders followed.     4/9/2019: Clinic visit with Dr. Horton.  Per Dr. Horton, wound deep measurement increased, draining present. Wound cultures from abdominal wound taken and sent to lab/micro, pending results. C/o abdominal pain and itching around wound, Rx for betamethasone cream 01.% and Norco 5/325mg give in clinic by Dr. Horton. Wound care dressing orders followed. Continue IV antibiotic as previously ordered.  04/17/19: F/u for non-healing wound to abdomen. Culture report negative. Dr. Horton ordered 1 more week of IV antibiotics. Continue with topical wound care as ordered.     4/24/2019: Clinic visit with Dr. Horton for abdominal wound treatment. Wound improvement present, pictures on file. Last IV antibiotic today 4/24/2019, [facility] to discontinue PICC line from left upper arm on 4/25/2019. Per Lyubov Prince, going to [place] on Friday 4/26/2019 and out of the country ([place]) on Monday 4/29/2019. This nurse spoke to Shaunna at [employer] (IV antibiotic delivery company) and informed last dose of antibiotic is today. Dr. Horton ordered Hydrocodone-acetaminophen (Norco) 5-325 mg (32 tabs), Bentyl 10 mg (120 capsules) and Gentamycin ointment. Education provided on the importance of eating food that contains iron (to prevent anemia) as well as eating meat and taking her blood pressure medications (blood pressure medication) on time, voices understanding. All questions answered by Dr. Horton. [facility] outpatient confirmed Betamethasone cream ordered on 4/10/2019 is ready for . Instructions and education provided to Mrs. Morrison on abdominal wound dressing changes, hand washing techniques and medication use, effects and side effects, voices and demonstrates understanding. Discharged home on stable conditions, states will give wound care clinic a call when she is back from  [place] in few months from now.     7/17/19: Readmit today to wound care clinic.  Patient was recently out of the country visiting family in [place].  Patient complains of pain and nausea as on prior visits.  Dr. Horton seen patient today discussed with patient again placing colostomy patient refused.  Culture of wound taken, Dr. Horton restarted patient on zofran po for nausea, and Norco for pain. [facility] restarted today in clinic on Mondays and Fridays and prn. Orders given to gently pack wound with aquacel ag rope, cover with aquacel extra, 4x4 gauze, small abd pad and mefix tape change dressing ever other day by [facility] and Prn per patient.     7/24/2019: Clinic visit with Dr. Horton. Continue with dressing change as ordered. Wound cultures reviewed with patient, lab work ordered by Dr. Horton, no fever present or reported at this time. C/o decrease appetite, medication periactin prescribed. Requesting needles for insulin pen, order prescribed by Dr. Horton.      7/31/2019: F/U clinic visit with Dr. Horton. C/o of excruciated back pain 10/10, not feeling well, lot of gas and left upper quadrant discomfort. New orders prescribed: Augmentin 875-125 mg by mouth twice a day. Bentyl 10 mg one tab by mouth 4 times a day. Tramadol 50 mg one tab by mouth every 6 hours as needed for pain. Abdomen ultrasound. Wound care dressing completed as ordered.     8/7/2019: Clinic visit with Dr. Horton, denies any pain at this time. Wound care dressing done as ordered, no changes in wound size from last visit noted. Continue taking oral antiiotic as ordered, pending abdominal ultrasound, scheduled for 8/13/2019.     8/14/2019: F/U clinic visit with Dr. Horton. Admitted and discharged from emergency department this morning with abdominal pain, CT and ultrasound in filed. Dr. Horton performed a small drainage from the abdominal wound, moderate amount of creamy, serosanguineous fluid from abdominal wound  present. Iodoform gauze packed into her wound, dressing changed as ordered. Wound cultures collected and sent to lab/micro, pending results. Rx for Norco 5/325 mg one tab PO every 4 hours PRN as needed # 24 given to pt by Dr. Horton.      8/21/2019: Clinic visit with Dr. Horton. Wound dressing changed as ordered.      11/6/19: Follow up appt with Dr Horton. Wounds improving slowly. Continues to take antibiotics (Augmentin 875-125mg). No complaints voiced. Follow up in 1 week.  11/13/19:  F/U clinic visit with Dr. Horton.  Wound depth per Dr. Horton is 8.5 cm.  Continue PT Augmentin.  Patient is co left sided abdominal pain.  Abdominal US and labs ordered.  Wound care tolerated well.  Fu with Dr. Horton in 1 week.      11/20/19:Dr Horton assessed patient. Silver nitrate applied to wound. Continuing present plan of care. Patient is scheduled to have abdominal ultra sound Friday 11/22/19. Continues to take Augmentin.  F/U in 1 week.    11/27/19: Follow up in clinic with Dr. Horton. Wound depth 8.5cm, silver nitrate x 3 per Dr. Horton, patient continues taking po Augmentin, c/o left sided abdominal pain, ultrasound results discussed with patient, Dr. Horton will watch for now.  Follow up 1 week 12/4/19.      12/04/19: F/u Dr. Horton. Continue with current wound care treatment. Rx given for Bentyl 10 mg. Patient to follow up in  1 week at wound clinic  12/11/19: F/u with . Continue with current wound care treatment. Rx given for Lotrisone cream to be applied daily per patient to right lower leg rash. Patient to follow up in  1 week at wound clinic     12/18/19: F/u with Dr. Horton. Patient c/o pain 9 out of 10  to abdomen LLQ. Area of pain is warm to touch, pink, and a small nodule can be felt. Patient states that symptoms began about 4 days ago. Stat CT scan ordered. Patient will have CT done tomorrow due to not fasting this morning. Instructed patient to fast before CT scan. Continue with current  wound care treatment      01/08/2020: F/u with Dr. Horton. Patient had I&D on 12/22/19 for abscess to abdomen. New wound care orders given. Patient continues on po abx and home health for wound care. Follow up in 1 week at wound care clinic     01/15/20: F/u with Dr. Horton. Patient c/o mild discomfort at wound to left abdomen. Patient denies any fever, chills, n&v, diarrhea, and/or constipation. Will continue to monitor. Continue with current wound care treatment and follow up in  Wound clinic in 1 week.  1/29/2020: Fu today in wound care clinic with .  Wounds improving slowly.  Silver nitrate x 1 to each wound per Dr. Horton patient tolerated well.  RX give to patient today for Amoxicillin po, Bentyl Po and Norco 5/325mg Po today in clinic.  Fu 1 week 2/5/2020 with Dr. Horton.  2/5/20: F/U with Dr. Horton. LUQ site resolved. Midline site same. Cont. POC.  2/12/20: F/U with Dr. Horton. LUQ site and midline probed by Dr. Horton. Cont. Augmentin. Rx for Norco provided. Cont. POC.  2/19/20: F/U with Dr. Horton. Afebrile. No s/s of infection. Culture done of midline today. Rx for Augmentin and Zofran sent electronically to pharmacy. Return in 1 week.  2/26/2020: FU MD visit. Cultures reviewed per MD, po abx changed from augmentin to tetracycline sent to pharmacy, patient verbalized understanding. Patient states she will be traveling to [place] on March 29 for 3 months.      03/04/2020: F/u with Dr. Horton. Patient c/o pain at wound site located on left abdomen, lethargy, and no appetite for the past 3 days . Culture taken and patient instructed to stop po tetracycline while awaiting culture results. New wound care orders given for left abdominal wound. Orders routed to home health. Patient to follow up in 1 week at wound clinic.  3/11/20: F/U with Dr. Horton. No c/o for pain today. Stop tetracycline. Rx for Augmentin sent to pharmacy. Cont. POC. Return in 1 week. HH orders routed.  3/18/20: F/U  with Dr. Horton. No c/o of pain at this time. Rx for Bentyl and Norco provided. Continue Augmentin. Return in 1 week. HH orders routed.     04/01/2020: F/u with Dr. Horton. Wound showing improvement. Patient states that she has not had home health nurse come out in 2 weeks. Patient also states that she would like us to order supplies so she can begin doing her own wound care. Supplies ordered. Patient instructed on daily wound care treatment and verbalized understanding. Continue po Augmentin BID  04/08/2020: F/u with Dr. Horton. Wound improving. Continue with current wound care treatment. Patient given rx for norco 5/325 mg and zofran 8 mg. No new complaints per patient today   04/22/20  F/U with Dr. Horton.  Wound unchanged.  Pack wounds with plain nu gauze with gentamicin ointment.  Patient given RX for Norco 5/325 mg, Zofran 8 mg, Amoxiciilin .  Patient to follow  Up with Dr. Horton in 1 week.  Wound care supplies ordered.  Review of Systems   04/29/2020: F/u with Dr. Horton. Wound probed with cotton-tip applicator per Dr. Horton to assist with fluid drainage. Patient tolerated well. Continue with current wound care treatment and follow up in 1 week at wound clinic  05/06/2020: F/u with Dr. Horton. Wound remains stable. Patient experiencing increased pain. CT scan of abdomen ordered by PCP. Patient got CT scan on Monday, and Dr. Horton reviewed results with patient. Continue current wound care treatment and follow up in 1 week  05/13/2020- pt follow-up with Dr. Horton. Superficial skin discoloration noted to rle, addressed by Dr. Horton. Rx for bentyl and lotrisone sent with patient. Patient to continue daily dressing changes. Patient to follow-up with Dr. Horton 05/20/2020.  5/21/20:  Fu with Dr. Horton.  Purulent drainage with odor coming from wounds.  Cx of mid abdominal wound obtained.  Rx given to patient for Amoxicillin 875/125mg and Norco 5/325mg.  Care tolerated well.  Fu with Dr. Horton in  1 week.  5/28/20:Dr Horton assessed patient. Continue with same plan of care. Rx for Doxycycline sent lab. Patient instructed on Rx and voiced understanding.        06/03/2020: F/u with Dr. Horton. Continue po doxycycline and current topical wound care treatment daily. Follow up in 1 week at wound care clinic  06/10/2020: Wounds remain stable. Patient c/o mild pain to left side of abdomen. Continue with topical wound care treatment. Rx sent to patient's pharmacy for norco 5-325 mg and lidocaine jelly 2%    Review of Systems   Constitutional: Negative.    HENT: Negative.    Eyes: Negative.    Respiratory: Negative.    Cardiovascular: Negative.    Gastrointestinal: Negative.    Genitourinary: Negative.    Musculoskeletal: Negative.    Skin: Negative.    Neurological: Negative.    Psychiatric/Behavioral: Negative.        Objective:      Physical Exam   Constitutional: She is oriented to person, place, and time. She appears well-developed and well-nourished.   HENT:   Head: Normocephalic.   Eyes: Pupils are equal, round, and reactive to light. Conjunctivae and EOM are normal.   Neck: Normal range of motion. Neck supple.   Cardiovascular: Normal rate, regular rhythm, normal heart sounds and intact distal pulses.   Pulmonary/Chest: Effort normal and breath sounds normal.   Abdominal: Soft. Bowel sounds are normal.   Musculoskeletal: Normal range of motion.   Neurological: She is alert and oriented to person, place, and time. She has normal reflexes.   Skin: Skin is warm and dry.       Assessment:       1. Abdominal wall abscess    2. Type 2 diabetes mellitus without complication, without long-term current use of insulin           Wound 02/01/20 1353 Ulceration midline Abdomen #1 (Active)   02/01/20 1353    Pre-existing: Yes   Primary Wound Type: Ulceration   Side:    Orientation: midline   Location: Abdomen   Wound/PI Number (optional): #1   Ankle-Brachial Index:    Pulses:    Removal Indication and Assessment:    Wound  Outcome:    (Retired) Wound Type:    (Retired) Wound Length (cm):    (Retired) Wound Width (cm):    (Retired) Depth (cm):    Wound Description (Comments):    Removal Indications:    Wound Image   6/10/2020  8:00 AM   Dressing Appearance Moist drainage 6/10/2020  8:00 AM   Drainage Amount Moderate 6/10/2020  8:00 AM   Drainage Characteristics/Odor Serosanguineous 6/10/2020  8:00 AM   Appearance Red;Moist 6/10/2020  8:00 AM   Tissue loss description Full thickness 6/10/2020  8:00 AM   Red (%), Wound Tissue Color 100 % 6/10/2020  8:00 AM   Periwound Area Intact 6/10/2020  8:00 AM   Wound Edges Open 6/10/2020  8:00 AM   Wound Length (cm) 0.3 cm 6/10/2020  8:00 AM   Wound Width (cm) 0.4 cm 6/10/2020  8:00 AM   Wound Depth (cm) 8.9 cm 6/10/2020  8:00 AM   Wound Volume (cm^3) 1.07 cm^3 6/10/2020  8:00 AM   Wound Surface Area (cm^2) 0.12 cm^2 6/10/2020  8:00 AM   Care Cleansed with:;Sterile normal saline 6/10/2020  8:00 AM   Dressing Applied;Calcium alginate;Gauze;Abd pad;Other (see comments) 6/10/2020  8:00 AM   Periwound Care Skin barrier film applied 6/10/2020  8:00 AM   Dressing Change Due 06/11/20 6/10/2020  8:00 AM            Wound 03/04/20 1023 Abdomen #2 (Active)   03/04/20 1023    Pre-existing:    Primary Wound Type:    Side: Left   Orientation:    Location: Abdomen   Wound/PI Number (optional): #2   Ankle-Brachial Index:    Pulses:    Removal Indication and Assessment:    Wound Outcome:    (Retired) Wound Type:    (Retired) Wound Length (cm):    (Retired) Wound Width (cm):    (Retired) Depth (cm):    Wound Description (Comments):    Removal Indications:    Wound Image   6/10/2020  8:00 AM   Dressing Appearance Moist drainage 6/10/2020  8:00 AM   Drainage Amount Moderate 6/10/2020  8:00 AM   Drainage Characteristics/Odor Yellow 6/10/2020  8:00 AM   Appearance Red;Moist 6/10/2020  8:00 AM   Tissue loss description Full thickness 6/10/2020  8:00 AM   Red (%), Wound Tissue Color 100 % 6/10/2020  8:00 AM   Periwound  Area Intact 6/10/2020  8:00 AM   Wound Edges Open 6/10/2020  8:00 AM   Wound Length (cm) 0.3 cm 6/10/2020  8:00 AM   Wound Width (cm) 0.4 cm 6/10/2020  8:00 AM   Wound Depth (cm) 7 cm 6/10/2020  8:00 AM   Wound Volume (cm^3) 0.84 cm^3 6/10/2020  8:00 AM   Wound Surface Area (cm^2) 0.12 cm^2 6/10/2020  8:00 AM   Care Cleansed with:;Sterile normal saline 6/10/2020  8:00 AM   Dressing Applied;Other (see comments);Gauze;Abd pad;Calcium alginate 6/10/2020  8:00 AM   Periwound Care Skin barrier film applied 6/10/2020  8:00 AM   Dressing Change Due 06/11/20 6/10/2020  8:00 AM       Abdominal wound midline   Clean wound with normal saline  Lidocaine 2% gel or 4 % Topical solution: PRN in clinic  Danielle wound: Cavilon, betamethasone PRN itching  Primary dressing: Apply gentamycin to plain nu gauze and lightly pack into wound bed   Secondary dressing: Cover with aquacel extra, 4 x 4 gauze, small ABD or border dressing and secure with mefix tape  Other:  Culture obtained    Left Upper Quadrant abdomen   Cleanse with normal saline  Lidocaine 2% gel or 4 % Topical solution: PRN in clinic  Periwound: Cavilon, betamethasone PRN itching  Primary dressing: Apply gentamicin ointment to plain packing and gently pack to wound depth  Secondary dressing: Cover with aquacel extra, 4x4 gauze, small abd pad or border dressing, and secure with mefix tape     Frequency: Daily per patient    Follow up in 1 week 06/17/2020 with Dr. Horton  Other orders: Continue po doxycycline. Rx sent to pharmacy for norco 5-325 mg and lidocaine jelly 2%    Plan:                Follow up in about 1 week (around 6/17/2020) for Dr. Horton.

## 2020-06-17 ENCOUNTER — HOSPITAL ENCOUNTER (OUTPATIENT)
Dept: WOUND CARE | Facility: HOSPITAL | Age: 70
Discharge: HOME | End: 2020-06-17
Attending: SURGERY
Payer: MEDICARE

## 2020-06-17 VITALS
TEMPERATURE: 98 F | DIASTOLIC BLOOD PRESSURE: 67 MMHG | SYSTOLIC BLOOD PRESSURE: 138 MMHG | HEART RATE: 80 BPM | WEIGHT: 157 LBS | BODY MASS INDEX: 26.16 KG/M2 | HEIGHT: 65 IN

## 2020-06-17 DIAGNOSIS — L08.9 LOCAL INFECTION OF SKIN AND SUBCUTANEOUS TISSUE: ICD-10-CM

## 2020-06-17 DIAGNOSIS — L02.211 ABDOMINAL WALL ABSCESS: Primary | ICD-10-CM

## 2020-06-17 DIAGNOSIS — E11.9 TYPE 2 DIABETES MELLITUS WITHOUT COMPLICATION, WITHOUT LONG-TERM CURRENT USE OF INSULIN: ICD-10-CM

## 2020-06-17 DIAGNOSIS — R10.32 LLQ PAIN: ICD-10-CM

## 2020-06-17 PROCEDURE — 99213 OFFICE O/P EST LOW 20 MIN: CPT

## 2020-06-18 ENCOUNTER — LAB VISIT (OUTPATIENT)
Dept: PRIMARY CARE CLINIC | Facility: OTHER | Age: 70
End: 2020-06-18
Payer: MEDICARE

## 2020-06-18 DIAGNOSIS — Z03.818 ENCOUNTER FOR OBSERVATION FOR SUSPECTED EXPOSURE TO OTHER BIOLOGICAL AGENTS RULED OUT: ICD-10-CM

## 2020-06-18 PROCEDURE — U0003 INFECTIOUS AGENT DETECTION BY NUCLEIC ACID (DNA OR RNA); SEVERE ACUTE RESPIRATORY SYNDROME CORONAVIRUS 2 (SARS-COV-2) (CORONAVIRUS DISEASE [COVID-19]), AMPLIFIED PROBE TECHNIQUE, MAKING USE OF HIGH THROUGHPUT TECHNOLOGIES AS DESCRIBED BY CMS-2020-01-R: HCPCS

## 2020-06-19 ENCOUNTER — TELEPHONE (OUTPATIENT)
Dept: SURGERY | Facility: CLINIC | Age: 70
End: 2020-06-19

## 2020-06-19 NOTE — TELEPHONE ENCOUNTER
----- Message from Jyotsna Valentino sent at 6/19/2020  9:55 AM CDT -----  Contact: Daughter [phone]  New Patient would like to speak with you about a personal matter. Please advise        06/19/2020    1510  Attempted to contact patient regarding the above messge via language line (Fabiana ID#[id number]). No answer. Left voicemail.

## 2020-06-21 LAB — SARS-COV-2 RNA RESP QL NAA+PROBE: NOT DETECTED

## 2020-06-24 ENCOUNTER — HOSPITAL ENCOUNTER (OUTPATIENT)
Dept: WOUND CARE | Facility: HOSPITAL | Age: 70
Discharge: HOME | End: 2020-06-24
Attending: SURGERY
Payer: MEDICARE

## 2020-06-24 VITALS
BODY MASS INDEX: 26.16 KG/M2 | DIASTOLIC BLOOD PRESSURE: 76 MMHG | HEIGHT: 65 IN | TEMPERATURE: 97 F | WEIGHT: 157 LBS | HEART RATE: 72 BPM | SYSTOLIC BLOOD PRESSURE: 140 MMHG

## 2020-06-24 DIAGNOSIS — L02.211 ABDOMINAL WALL ABSCESS: Primary | ICD-10-CM

## 2020-06-24 DIAGNOSIS — K63.2 COLOCUTANEOUS FISTULA: ICD-10-CM

## 2020-06-24 DIAGNOSIS — T81.43XA POSTPROCEDURAL INTRAABDOMINAL ABSCESS: ICD-10-CM

## 2020-06-24 DIAGNOSIS — E11.9 TYPE 2 DIABETES MELLITUS WITHOUT COMPLICATION, WITHOUT LONG-TERM CURRENT USE OF INSULIN: ICD-10-CM

## 2020-06-24 PROCEDURE — 99213 OFFICE O/P EST LOW 20 MIN: CPT

## 2020-06-24 NOTE — PROGRESS NOTES
"Subjective:       Patient ID: Azucena Morrison is a 70 y.o. female.    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 Pt denies any fevers or chills but states she feels nauseous at times. Returned to [place] June 28, from [place], reports much trouble with abdominal wound while in [place].  7/6/17-- Patient scheduled for surgical drainage of wound Tuesday 7/11/17DB  7/19/17-- Patient post op clinic visit.  8/16/17: CT of abdomen and pelvis done 8/9/17 due to suspected fistula  8/23/17-- U/S of abdomen done today.  12/13/17 Wound vac with 20cc drainage in clinic today.  1/10/18: on PO abx  1/24/18: Fistulagram ordered per Dr. Horton. Patient still on PO abx  02/21/18 Patient is not on antibiotics at this time. BS fasting 135 per patient report this am.  03/07/18 Culture of Anterior Abdominal Wound is positive. No antibiotics at this time.  fasting per patient report.    03/21/18 Patient c/o nausea along with abdominal tenderness near abd midline wound. Patient states that pain level is 6 and that she passed two sero-sanguineous clots from wound. Patient is afebrile this am.  3/28/18: patient continue antibiotics and reports that pain is less than last weeks clinic visit. Drainage has decreased as well  04/04/18 Patient states that abdominal pain is "pressure" sensation and that when she pushes down on her abdomen on either side of abdominal wound she feels like she has to urinate. Patient reports pain level of 3 this am.  fasting this am per patient report.   5/2/18: Patient had Abdomina Toribio today before wound care. She had discontinued Bactrim PO per Dr. Horton's recommendation and culture results and is now taking Amoxicillin PO  6/20/18: Pt reports itching to wound and periwound   6/27/18: patient reports no new complaints with regards to her wound or " abdomen, wound continues to decrease in size and drainage  8/1/18: Pt reports increased pain to abdomen with nausea and emesis since thursday 7/26/18, denies any fevers, patient did no go to ER as instructed by home health nurse on Sat. 7/28/18.  8/29/18: Patient admitted to hospital 8/2 for abdominal wound complications. Surgery for abdominal abscess per Dr. Horton on 8/3. Patient placed on IV antibiotics and discharged home on 8/18 with [facility]  and PICC line to [facility]. Patient currently on IV ertapenem. IV medication sent to patients home per [facility].   9/5/18: IV Ertapenem to continue 1 week. Culture sent to lab. [facility]  sent orders. PICC line intact to [facility].  9/12/18 Antibiotics completed. Culture results pending. 1 deep stitch remaining.  9/12/18: Culture positive for E. Coli, patient to continue Ertapenem IV antibiotics x 2weeks. [facility] notified  9/26/18: F/U abdominal wound, wound continues to improve. Patient will complete IV antibiotics today  10/3/18: patient c/o diarrhea for 2 days and nausea with some vomiting. Labs and stool culture ordered. IV antibiotics discontinued today  10/10/2018 patient will start on IV antibiotic Invaz IV once a day, pending PICC line placement, order placed today  for PICC per Dr. Horton.  10/24/18: patient on IV antibiotics, tolerating well. Continue for 1 week  11/7/2018: IV Antibiotic discontinued.  11/8/2018: PICC line to left upper arm discontinued by Home Health.  11/21/2018 F/u for abdominal wall fistula , c/o nausea  No vomiting , no fever  12/5/2018: F/u for abdominal wall fisula, c/o gas, Dr. Horton will order Bentyl. Surgery schedule for January 2019.  12/12/2018: F/u for abdominal wall fistula, c/o abdominal pain. Dr. Horton ordered Norco 5/325mg tab 1 PO every 4 hours as needed for pain and referred patient for x-ray of abdomen after clinic today.  12/19/2018: F/u for abdominal wall fisula, c/o nausea, Dr. Horton re ordered  Ondansetron (Zofran) 8mg one tab by mouth every eight hours as needed for nausea. No changes in wound condition from last visit noted in clinic today.  12/26/2018: F/u abdominal wall fistula drainage, no c/o at this time. Denies any abdominal pain or nausea. Dr. Horton recommends surgery first week of January 2019 for abdominal fistula treatment and possible colostomy placement, patient agree.  01/02/2019: F/u abdominal wall fistula. Dressing with large amount of drainage, malodorous, Dr. Horton is scheduling surgery for third week of January, 2019.  1/16/19: F/U Dr. Horton today in clinic, surgery scheduled for next Friday 1/25/19 with Dr. Horton.     01/23/2019: Presents to wound care clinic for f/u abdominal wound care tx. Surgery scheduled with Dr. Horton for Friday 01/25/2019. Dr. Horton explained Mrs. Morrison surgery procedure including possible complications, Nurse  (Upper sorbian) present, patient verbalizes understanding of procedure including possible complications, all questions from patient were answered by Dr. Horton including blood sugar and blood pressure medications per patient's concerns. Consent for surgery and blood transfusion signed by patient, Dr. Horton and nurse witnessed.  Abdominal wound cultures collected per Dr. Horton, cultures sent to lab/micro pending results. Dr. Horton prescribed the following orders: fleet enema for Thursday 1/24/2019, clear liquid diet and not to eat after midnight all for 1/24/2019. Start taking Neomycin and erythromycin by mouth three times a day (1/24/2019) and dulcolax one tab by mouth twice a day, Mrs. Morrison voices understanding.     01/30/2019: F/U wound care treatment with Dr. Horton. Per pt, primary physician Dr. Pj Monae ordered for her to take potassium pills for three days, last day today and scheduled for lab work at primary physician clinic on 1/31/2019. Per pt. Feeling better, no more abdominal pain (went to ER 1/24/2019 and  "discharged 1/25/2019 c/o abd pain.). Dr. Horton awaiting on primary clearance to re-schedule surgery, per pt. She will call wound care clinic and Dr. Horton to make aware of clearance day. Continue with same orders for dressing change for Dr. Horton. Mrs. Morrison requesting gentamicin refill.     02/06/2019: F/u with Dr. Horton for wound care treatment. Mrs. Morrison brought to clinic a clearance for surgery by Dr. Pj Sanches dated 02/06/2019 "Hyperkalemia-Resolved K 4.8 2/1/2019, labs , Cr. 1.05, H/H 10/30. No contraindication to surgery, tight control of BS, Hydration." A copy of EKG (1/24/2019) and lab work from [facility] collected 1/31/2019, reported results 2/1/2019. Dr. Horton scheduled surgery for 02/22/2019, consent were signed on 01/23/2019, all questions were answered by Dr. Horton, this nurse  (Dive[place]) present. Education provided to patient on the importance of having a clear liquid diet the day before surgery 02/21/2019 as per Dr. Horton, new medications and preop preparation before surgery, verbalizes understanding. Dr. Horton ordered Norco 5/325 mg PO for pain, Dulcolax two tabs by mouth to take on 02/21/2019, Soap suds enema (SSE) on Thursday 02/21/2019, Golytely by mouth 2 liters on 2/21/2019, Erythromycin 500 mg one tab by mouth three times a day and Neomycin 1 gm by mouth three times a day.     2/13/2019: Presents to wound care clinic for a f/u with Dr. Horton for wound care treatment. No new orders in wound dressing change, reviewed preop orders with Mrs. Morrison per Dr. Horton, all questions answered. Surgery scheduled for 02/22/2019.  2/20/19: Continues wound care a previously ordered.  Depth measured per Dr. Horton 8cm.  Preop orders reviewed with patient who verbalized understanding.  Surgery scheduled for Fri 2/22/19.  Rx given to patient for Norco and Zofran.    03/06/19: Patient admitted to [facility] on 02/22/19 for exploratory laparotomy of " "abdomen per Dr. Horton. Patient discharged on 02/28/19 with home health and PICC line with IV antibiotics. Staples removed today in clinic. Patient denies any fever, chills, n&v; however, she states that she has "low energy." Continued with wound care as ordered.     3/13/2019: F/U with Dr. Horton for wound care treatment. Continue with same wound dressing change orders as per Dr. Horton, orders followed. Home Health to continue wound dressing change and lab draw for CBC weekly; continue IV Ertapenem/Invaz 1 gm IV daily as ordered.     3/20/2019: Clinic visit with Dr. Horton for abdominal abscess treatment. Presents with moderate draining from abdominal wound; wound size decreasing per measurement. Per Dr. Horton, apply one-piece system ostomy bag to abdominal wound for drain collection, may drain bag when it fills and may change every other day, continue applying gentamicin to wound bed before applying ostomy bag. Continue IV Invaz until completion. If ostomy bag not effective for draining collection, may return to previous orders for wound dressing change. Orders followed. Education provided to Mrs. Morrison on handwashing and ostomy care techniques, voices and demonstrates understanding.   3/27/19: Follow up with Dr. Horton today in clinic for abdominal abscess, moderated tanish yellow drainge present on dressing.  Wound slowly improving, patient refused one-piece ostomy bag stated " no it's too messy." requested to return to previous dressing prior to ostomy bag placement- iodoform gauze, aquacel extra, sm abd, and mefix tape.  Silver nitrate x1 per Dr. Horton today in clinic. Rx given to patient for PO Zofran.     04/03/2019: F/u clinic visit with Dr. Horton. Abdominal wound improving in size, picture on file. Wound cultures from abdominal abscess collected and sent to lab/micro, pending results. Mrs. Morrison states going out of the country ([place]) at the end of April and plans to stay there for " approximately two months. Dr. Horton ordered IV antibiotic for two more weeks and new wound care dressing, orders followed.     4/9/2019: Clinic visit with Dr. Horton.  Per Dr. Horton, wound deep measurement increased, draining present. Wound cultures from abdominal wound taken and sent to lab/micro, pending results. C/o abdominal pain and itching around wound, Rx for betamethasone cream 01.% and Norco 5/325mg give in clinic by Dr. Horton. Wound care dressing orders followed. Continue IV antibiotic as previously ordered.  04/17/19: F/u for non-healing wound to abdomen. Culture report negative. Dr. Horton ordered 1 more week of IV antibiotics. Continue with topical wound care as ordered.     4/24/2019: Clinic visit with Dr. Horton for abdominal wound treatment. Wound improvement present, pictures on file. Last IV antibiotic today 4/24/2019, [facility] to discontinue PICC line from left upper arm on 4/25/2019. Per Lyubov Prince, going to [place] on Friday 4/26/2019 and out of the country ([place]) on Monday 4/29/2019. This nurse spoke to Shaunna at [employer] (IV antibiotic delivery company) and informed last dose of antibiotic is today. Dr. Horton ordered Hydrocodone-acetaminophen (Norco) 5-325 mg (32 tabs), Bentyl 10 mg (120 capsules) and Gentamycin ointment. Education provided on the importance of eating food that contains iron (to prevent anemia) as well as eating meat and taking her blood pressure medications (blood pressure medication) on time, voices understanding. All questions answered by Dr. Horton. [facility] outpatient confirmed Betamethasone cream ordered on 4/10/2019 is ready for . Instructions and education provided to Mrs. Morrison on abdominal wound dressing changes, hand washing techniques and medication use, effects and side effects, voices and demonstrates understanding. Discharged home on stable conditions, states will give wound care clinic a call when she is back from  [place] in few months from now.     7/17/19: Readmit today to wound care clinic.  Patient was recently out of the country visiting family in [place].  Patient complains of pain and nausea as on prior visits.  Dr. Horton seen patient today discussed with patient again placing colostomy patient refused.  Culture of wound taken, Dr. Horton restarted patient on zofran po for nausea, and Norco for pain. [facility] restarted today in clinic on Mondays and Fridays and prn. Orders given to gently pack wound with aquacel ag rope, cover with aquacel extra, 4x4 gauze, small abd pad and mefix tape change dressing ever other day by [facility] and Prn per patient.     7/24/2019: Clinic visit with Dr. Horton. Continue with dressing change as ordered. Wound cultures reviewed with patient, lab work ordered by Dr. Horton, no fever present or reported at this time. C/o decrease appetite, medication periactin prescribed. Requesting needles for insulin pen, order prescribed by Dr. Horton.      7/31/2019: F/U clinic visit with Dr. Horton. C/o of excruciated back pain 10/10, not feeling well, lot of gas and left upper quadrant discomfort. New orders prescribed: Augmentin 875-125 mg by mouth twice a day. Bentyl 10 mg one tab by mouth 4 times a day. Tramadol 50 mg one tab by mouth every 6 hours as needed for pain. Abdomen ultrasound. Wound care dressing completed as ordered.     8/7/2019: Clinic visit with Dr. Horton, denies any pain at this time. Wound care dressing done as ordered, no changes in wound size from last visit noted. Continue taking oral antiiotic as ordered, pending abdominal ultrasound, scheduled for 8/13/2019.     8/14/2019: F/U clinic visit with Dr. Horton. Admitted and discharged from emergency department this morning with abdominal pain, CT and ultrasound in filed. Dr. Horton performed a small drainage from the abdominal wound, moderate amount of creamy, serosanguineous fluid from abdominal wound  present. Iodoform gauze packed into her wound, dressing changed as ordered. Wound cultures collected and sent to lab/micro, pending results. Rx for Norco 5/325 mg one tab PO every 4 hours PRN as needed # 24 given to pt by Dr. Horton.      8/21/2019: Clinic visit with Dr. Horton. Wound dressing changed as ordered.      11/6/19: Follow up appt with Dr Horton. Wounds improving slowly. Continues to take antibiotics (Augmentin 875-125mg). No complaints voiced. Follow up in 1 week.  11/13/19:  F/U clinic visit with Dr. Horton.  Wound depth per Dr. Horton is 8.5 cm.  Continue PT Augmentin.  Patient is co left sided abdominal pain.  Abdominal US and labs ordered.  Wound care tolerated well.  Fu with Dr. Horton in 1 week.      11/20/19:Dr Horton assessed patient. Silver nitrate applied to wound. Continuing present plan of care. Patient is scheduled to have abdominal ultra sound Friday 11/22/19. Continues to take Augmentin.  F/U in 1 week.    11/27/19: Follow up in clinic with Dr. Horton. Wound depth 8.5cm, silver nitrate x 3 per Dr. Horton, patient continues taking po Augmentin, c/o left sided abdominal pain, ultrasound results discussed with patient, Dr. Horton will watch for now.  Follow up 1 week 12/4/19.      12/04/19: F/u Dr. Horton. Continue with current wound care treatment. Rx given for Bentyl 10 mg. Patient to follow up in  1 week at wound clinic  12/11/19: F/u with . Continue with current wound care treatment. Rx given for Lotrisone cream to be applied daily per patient to right lower leg rash. Patient to follow up in  1 week at wound clinic     12/18/19: F/u with Dr. Horton. Patient c/o pain 9 out of 10  to abdomen LLQ. Area of pain is warm to touch, pink, and a small nodule can be felt. Patient states that symptoms began about 4 days ago. Stat CT scan ordered. Patient will have CT done tomorrow due to not fasting this morning. Instructed patient to fast before CT scan. Continue with current  wound care treatment      01/08/2020: F/u with Dr. Horton. Patient had I&D on 12/22/19 for abscess to abdomen. New wound care orders given. Patient continues on po abx and home health for wound care. Follow up in 1 week at wound care clinic     01/15/20: F/u with Dr. Horton. Patient c/o mild discomfort at wound to left abdomen. Patient denies any fever, chills, n&v, diarrhea, and/or constipation. Will continue to monitor. Continue with current wound care treatment and follow up in  Wound clinic in 1 week.  1/29/2020: Fu today in wound care clinic with .  Wounds improving slowly.  Silver nitrate x 1 to each wound per Dr. Horton patient tolerated well.  RX give to patient today for Amoxicillin po, Bentyl Po and Norco 5/325mg Po today in clinic.  Fu 1 week 2/5/2020 with Dr. Horton.  2/5/20: F/U with Dr. Horton. LUQ site resolved. Midline site same. Cont. POC.  2/12/20: F/U with Dr. Horton. LUQ site and midline probed by Dr. Horton. Cont. Augmentin. Rx for Norco provided. Cont. POC.  2/19/20: F/U with Dr. Horton. Afebrile. No s/s of infection. Culture done of midline today. Rx for Augmentin and Zofran sent electronically to pharmacy. Return in 1 week.  2/26/2020: FU MD visit. Cultures reviewed per MD, po abx changed from augmentin to tetracycline sent to pharmacy, patient verbalized understanding. Patient states she will be traveling to [place] on March 29 for 3 months.      03/04/2020: F/u with Dr. Horton. Patient c/o pain at wound site located on left abdomen, lethargy, and no appetite for the past 3 days . Culture taken and patient instructed to stop po tetracycline while awaiting culture results. New wound care orders given for left abdominal wound. Orders routed to home health. Patient to follow up in 1 week at wound clinic.  3/11/20: F/U with Dr. Horton. No c/o for pain today. Stop tetracycline. Rx for Augmentin sent to pharmacy. Cont. POC. Return in 1 week. HH orders routed.  3/18/20: F/U  with Dr. Horton. No c/o of pain at this time. Rx for Bentyl and Norco provided. Continue Augmentin. Return in 1 week. HH orders routed.     04/01/2020: F/u with Dr. Horton. Wound showing improvement. Patient states that she has not had home health nurse come out in 2 weeks. Patient also states that she would like us to order supplies so she can begin doing her own wound care. Supplies ordered. Patient instructed on daily wound care treatment and verbalized understanding. Continue po Augmentin BID  04/08/2020: F/u with Dr. Horton. Wound improving. Continue with current wound care treatment. Patient given rx for norco 5/325 mg and zofran 8 mg. No new complaints per patient today   04/22/20  F/U with Dr. Horton.  Wound unchanged.  Pack wounds with plain nu gauze with gentamicin ointment.  Patient given RX for Norco 5/325 mg, Zofran 8 mg, Amoxiciilin .  Patient to follow  Up with Dr. Horton in 1 week.  Wound care supplies ordered.  Review of Systems   04/29/2020: F/u with Dr. Horton. Wound probed with cotton-tip applicator per Dr. Horton to assist with fluid drainage. Patient tolerated well. Continue with current wound care treatment and follow up in 1 week at wound clinic  05/06/2020: F/u with Dr. Horton. Wound remains stable. Patient experiencing increased pain. CT scan of abdomen ordered by PCP. Patient got CT scan on Monday, and Dr. Horton reviewed results with patient. Continue current wound care treatment and follow up in 1 week  05/13/2020- pt follow-up with Dr. Horton. Superficial skin discoloration noted to rle, addressed by Dr. Horton. Rx for bentyl and lotrisone sent with patient. Patient to continue daily dressing changes. Patient to follow-up with Dr. Horton 05/20/2020.  5/21/20:  Fu with Dr. Horton.  Purulent drainage with odor coming from wounds.  Cx of mid abdominal wound obtained.  Rx given to patient for Amoxicillin 875/125mg and Norco 5/325mg.  Care tolerated well.  Fu with Dr. Horton in  1 week.  5/28/20:Dr Horton assessed patient. Continue with same plan of care. Rx for Doxycycline sent lab. Patient instructed on Rx and voiced understanding.        06/03/2020: F/u with Dr. Horton. Continue po doxycycline and current topical wound care treatment daily. Follow up in 1 week at wound care clinic  06/10/2020: Wounds remain stable. Patient c/o mild pain to left side of abdomen. Continue with topical wound care treatment. Rx sent to patient's pharmacy for norco 5-325 mg and lidocaine jelly 2%  06/17/2020: F/u with Dr. Horton. Wounds remain stable. Continue with topical wound care treatment and follow up next week.  06/24/2020: F/u with Dr. Horton. Wounds remain stable. Patient going for 2nd opinion next week r/t hernia. Continue with current topical wound care treatment and follow up in 1 week at wound clinic.    Review of Systems   Constitutional: Negative.    HENT: Negative.    Eyes: Negative.    Respiratory: Negative.    Cardiovascular: Negative.    Gastrointestinal: Negative.    Genitourinary: Negative.    Musculoskeletal: Negative.    Skin: Negative.    Neurological: Negative.    Psychiatric/Behavioral: Negative.        Objective:      Physical Exam  Constitutional:       Appearance: She is well-developed.   HENT:      Head: Normocephalic.   Eyes:      Conjunctiva/sclera: Conjunctivae normal.      Pupils: Pupils are equal, round, and reactive to light.   Neck:      Musculoskeletal: Normal range of motion and neck supple.   Cardiovascular:      Rate and Rhythm: Normal rate and regular rhythm.      Heart sounds: Normal heart sounds.   Pulmonary:      Effort: Pulmonary effort is normal.      Breath sounds: Normal breath sounds.   Abdominal:      General: Bowel sounds are normal.      Palpations: Abdomen is soft.   Musculoskeletal: Normal range of motion.   Skin:     General: Skin is warm and dry.   Neurological:      Mental Status: She is alert and oriented to person, place, and time.      Deep  Tendon Reflexes: Reflexes are normal and symmetric.         Assessment:       1. Abdominal wall abscess    2. Type 2 diabetes mellitus without complication, without long-term current use of insulin           Wound 02/01/20 1353 Ulceration midline Abdomen #1 (Active)   02/01/20 1353    Pre-existing: Yes   Primary Wound Type: Ulceration   Side:    Orientation: midline   Location: Abdomen   Wound Number (optional): #1   Ankle-Brachial Index:    Pulses:    Removal Indication and Assessment:    Wound Outcome:    (Retired) Wound Type:    (Retired) Wound Length (cm):    (Retired) Wound Width (cm):    (Retired) Depth (cm):    Wound Description (Comments):    Removal Indications:    Wound Image   06/24/20 0800   Dressing Appearance Intact 06/24/20 0800   Drainage Amount Moderate 06/24/20 0800   Drainage Characteristics/Odor Serosanguineous 06/24/20 0800   Appearance Red;Moist 06/24/20 0800   Tissue loss description Full thickness 06/24/20 0800   Red (%), Wound Tissue Color 100 % 06/24/20 0800   Periwound Area Intact 06/24/20 0800   Wound Edges Open 06/24/20 0800   Wound Length (cm) 0.5 cm 06/24/20 0800   Wound Width (cm) 0.3 cm 06/24/20 0800   Wound Depth (cm) 5.9 cm 06/24/20 0800   Wound Volume (cm^3) 0.88 cm^3 06/24/20 0800   Wound Surface Area (cm^2) 0.15 cm^2 06/24/20 0800   Care Cleansed with:;Sterile normal saline 06/24/20 0800   Dressing Applied;Calcium alginate;Gauze;Abd pad;Other (see comments) 06/24/20 0800   Periwound Care Skin barrier film applied 06/24/20 0800   Dressing Change Due 06/25/20 06/24/20 0800            Wound 03/04/20 1023 Abdomen #2 (Active)   03/04/20 1023    Pre-existing:    Primary Wound Type:    Side: Left   Orientation:    Location: Abdomen   Wound Number (optional): #2   Ankle-Brachial Index:    Pulses:    Removal Indication and Assessment:    Wound Outcome:    (Retired) Wound Type:    (Retired) Wound Length (cm):    (Retired) Wound Width (cm):    (Retired) Depth (cm):    Wound Description  (Comments):    Removal Indications:    Wound Image   06/24/20 0800   Dressing Appearance Dry 06/24/20 0800   Drainage Amount Moderate 06/24/20 0800   Drainage Characteristics/Odor Serosanguineous 06/24/20 0800   Appearance Pink;Red;Moist 06/24/20 0800   Tissue loss description Full thickness 06/24/20 0800   Red (%), Wound Tissue Color 100 % 06/24/20 0800   Periwound Area Intact 06/24/20 0800   Wound Edges Irregular 06/24/20 0800   Wound Length (cm) 0.2 cm 06/24/20 0800   Wound Width (cm) 0.2 cm 06/24/20 0800   Wound Depth (cm) 5 cm 06/24/20 0800   Wound Volume (cm^3) 0.2 cm^3 06/24/20 0800   Wound Surface Area (cm^2) 0.04 cm^2 06/24/20 0800   Care Cleansed with:;Sterile normal saline 06/24/20 0800   Dressing Applied;Other (see comments);Abd pad;Calcium alginate;Gauze 06/24/20 0800   Periwound Care Skin barrier film applied 06/24/20 0800   Dressing Change Due 06/25/20 06/24/20 0800     Abdominal wound midline   Clean wound with normal saline   Lidocaine 2% gel or 4 % Topical solution: PRN in clinic   Danielle wound: Cavilon, betamethasone PRN itching   Primary dressing: Apply gentamycin to plain nu gauze and lightly pack into wound bed   Secondary dressing: Cover with aquacel extra, 4 x 4 gauze, small ABD or border dressing and secure with mefix tape     Left Upper Quadrant abdomen   Cleanse with normal saline   Lidocaine 2% gel or 4 % Topical solution: PRN in clinic   Periwound: Cavilon, betamethasone PRN itching   Primary dressing: Apply gentamicin ointment to plain packing and gently pack to wound depth   Secondary dressing: Cover with aquacel extra, 4x4 gauze, small abd pad or border dressing, and secure with mefix tape       Frequency: Daily per patient     Other orders: Continue po doxycycline. Rx given to patient for Bentyl 10 mg tid, Norco 5/325 mg, and gentamicin ointment       Plan:                Follow up in about 1 week (around 7/1/2020) for Dr. Horton.

## 2020-06-29 ENCOUNTER — HOSPITAL ENCOUNTER (INPATIENT)
Facility: HOSPITAL | Age: 70
LOS: 4 days | Discharge: HOME | DRG: 389 | End: 2020-07-03
Attending: EMERGENCY MEDICINE | Admitting: SURGERY
Payer: MEDICARE

## 2020-06-29 DIAGNOSIS — T14.8XXA DRAINAGE FROM WOUND: ICD-10-CM

## 2020-06-29 DIAGNOSIS — K56.609 SBO (SMALL BOWEL OBSTRUCTION): Primary | ICD-10-CM

## 2020-06-29 DIAGNOSIS — E11.9 TYPE 2 DIABETES MELLITUS WITHOUT COMPLICATION, WITHOUT LONG-TERM CURRENT USE OF INSULIN: ICD-10-CM

## 2020-06-29 DIAGNOSIS — I10 ESSENTIAL HYPERTENSION: ICD-10-CM

## 2020-06-29 LAB
ALBUMIN SERPL BCP-MCNC: 3.2 G/DL (ref 3.5–5.2)
ALP SERPL-CCNC: 62 U/L (ref 55–135)
ALT SERPL W/O P-5'-P-CCNC: 8 U/L (ref 10–44)
ANION GAP SERPL CALC-SCNC: 12 MMOL/L (ref 8–16)
AST SERPL-CCNC: 14 U/L (ref 10–40)
BASOPHILS # BLD AUTO: 0.02 K/UL (ref 0–0.2)
BASOPHILS NFR BLD: 0.2 % (ref 0–1.9)
BILIRUB SERPL-MCNC: 0.3 MG/DL (ref 0.1–1)
BILIRUB UR QL STRIP: NEGATIVE
BUN SERPL-MCNC: 15 MG/DL (ref 8–23)
CALCIUM SERPL-MCNC: 9.5 MG/DL (ref 8.7–10.5)
CHLORIDE SERPL-SCNC: 96 MMOL/L (ref 95–110)
CLARITY UR: CLEAR
CO2 SERPL-SCNC: 25 MMOL/L (ref 23–29)
COLOR UR: YELLOW
CREAT SERPL-MCNC: 1.1 MG/DL (ref 0.5–1.4)
DIFFERENTIAL METHOD: ABNORMAL
EOSINOPHIL # BLD AUTO: 0.2 K/UL (ref 0–0.5)
EOSINOPHIL NFR BLD: 1.7 % (ref 0–8)
ERYTHROCYTE [DISTWIDTH] IN BLOOD BY AUTOMATED COUNT: 16.7 % (ref 11.5–14.5)
EST. GFR  (AFRICAN AMERICAN): 59 ML/MIN/1.73 M^2
EST. GFR  (NON AFRICAN AMERICAN): 51 ML/MIN/1.73 M^2
GLUCOSE SERPL-MCNC: 129 MG/DL (ref 70–110)
GLUCOSE UR QL STRIP: NEGATIVE
HCT VFR BLD AUTO: 29.2 % (ref 37–48.5)
HGB BLD-MCNC: 9.4 G/DL (ref 12–16)
HGB UR QL STRIP: NEGATIVE
IMM GRANULOCYTES # BLD AUTO: 0.04 K/UL (ref 0–0.04)
IMM GRANULOCYTES NFR BLD AUTO: 0.4 % (ref 0–0.5)
KETONES UR QL STRIP: NEGATIVE
LEUKOCYTE ESTERASE UR QL STRIP: NEGATIVE
LIPASE SERPL-CCNC: 23 U/L (ref 4–60)
LYMPHOCYTES # BLD AUTO: 1.8 K/UL (ref 1–4.8)
LYMPHOCYTES NFR BLD: 16.7 % (ref 18–48)
MCH RBC QN AUTO: 23.2 PG (ref 27–31)
MCHC RBC AUTO-ENTMCNC: 32.2 G/DL (ref 32–36)
MCV RBC AUTO: 72 FL (ref 82–98)
MONOCYTES # BLD AUTO: 0.7 K/UL (ref 0.3–1)
MONOCYTES NFR BLD: 6.4 % (ref 4–15)
NEUTROPHILS # BLD AUTO: 7.8 K/UL (ref 1.8–7.7)
NEUTROPHILS NFR BLD: 74.6 % (ref 38–73)
NITRITE UR QL STRIP: NEGATIVE
NRBC BLD-RTO: 0 /100 WBC
PH UR STRIP: 7 [PH] (ref 5–8)
PLATELET # BLD AUTO: 459 K/UL (ref 150–350)
PMV BLD AUTO: 8.4 FL (ref 9.2–12.9)
POTASSIUM SERPL-SCNC: 3.4 MMOL/L (ref 3.5–5.1)
PROT SERPL-MCNC: 8.1 G/DL (ref 6–8.4)
PROT UR QL STRIP: NEGATIVE
RBC # BLD AUTO: 4.05 M/UL (ref 4–5.4)
SARS-COV-2 RDRP RESP QL NAA+PROBE: NEGATIVE
SODIUM SERPL-SCNC: 133 MMOL/L (ref 136–145)
SP GR UR STRIP: <=1.005 (ref 1–1.03)
URN SPEC COLLECT METH UR: ABNORMAL
UROBILINOGEN UR STRIP-ACNC: NEGATIVE EU/DL
WBC # BLD AUTO: 10.46 K/UL (ref 3.9–12.7)

## 2020-06-29 PROCEDURE — 12000002 HC ACUTE/MED SURGE SEMI-PRIVATE ROOM

## 2020-06-29 PROCEDURE — 63600175 PHARM REV CODE 636 W HCPCS: Performed by: EMERGENCY MEDICINE

## 2020-06-29 PROCEDURE — 83690 ASSAY OF LIPASE: CPT

## 2020-06-29 PROCEDURE — 96375 TX/PRO/DX INJ NEW DRUG ADDON: CPT

## 2020-06-29 PROCEDURE — 25500020 PHARM REV CODE 255: Performed by: EMERGENCY MEDICINE

## 2020-06-29 PROCEDURE — 81003 URINALYSIS AUTO W/O SCOPE: CPT

## 2020-06-29 PROCEDURE — 96374 THER/PROPH/DIAG INJ IV PUSH: CPT

## 2020-06-29 PROCEDURE — 80053 COMPREHEN METABOLIC PANEL: CPT

## 2020-06-29 PROCEDURE — 99285 EMERGENCY DEPT VISIT HI MDM: CPT | Mod: 25

## 2020-06-29 PROCEDURE — U0002 COVID-19 LAB TEST NON-CDC: HCPCS

## 2020-06-29 PROCEDURE — 85025 COMPLETE CBC W/AUTO DIFF WBC: CPT

## 2020-06-29 RX ORDER — MORPHINE SULFATE 4 MG/ML
4 INJECTION, SOLUTION INTRAMUSCULAR; INTRAVENOUS
Status: COMPLETED | OUTPATIENT
Start: 2020-06-29 | End: 2020-06-29

## 2020-06-29 RX ORDER — ONDANSETRON 2 MG/ML
4 INJECTION INTRAMUSCULAR; INTRAVENOUS
Status: COMPLETED | OUTPATIENT
Start: 2020-06-29 | End: 2020-06-29

## 2020-06-29 RX ADMIN — ONDANSETRON 4 MG: 2 INJECTION INTRAMUSCULAR; INTRAVENOUS at 09:06

## 2020-06-29 RX ADMIN — MORPHINE SULFATE 4 MG: 4 INJECTION INTRAVENOUS at 09:06

## 2020-06-29 RX ADMIN — IOHEXOL 75 ML: 350 INJECTION, SOLUTION INTRAVENOUS at 09:06

## 2020-06-30 LAB
POCT GLUCOSE: 111 MG/DL (ref 70–110)
POCT GLUCOSE: 117 MG/DL (ref 70–110)
POCT GLUCOSE: 120 MG/DL (ref 70–110)
POCT GLUCOSE: 170 MG/DL (ref 70–110)
POCT GLUCOSE: 98 MG/DL (ref 70–110)

## 2020-06-30 PROCEDURE — 63600175 PHARM REV CODE 636 W HCPCS: Performed by: SURGERY

## 2020-06-30 PROCEDURE — 25000003 PHARM REV CODE 250: Performed by: SURGERY

## 2020-06-30 PROCEDURE — 96375 TX/PRO/DX INJ NEW DRUG ADDON: CPT

## 2020-06-30 PROCEDURE — 96376 TX/PRO/DX INJ SAME DRUG ADON: CPT

## 2020-06-30 PROCEDURE — 94761 N-INVAS EAR/PLS OXIMETRY MLT: CPT

## 2020-06-30 PROCEDURE — 11000001 HC ACUTE MED/SURG PRIVATE ROOM

## 2020-06-30 PROCEDURE — 96361 HYDRATE IV INFUSION ADD-ON: CPT

## 2020-06-30 RX ORDER — HYDROCODONE BITARTRATE AND ACETAMINOPHEN 5; 325 MG/1; MG/1
1 TABLET ORAL EVERY 6 HOURS PRN
Status: DISCONTINUED | OUTPATIENT
Start: 2020-06-30 | End: 2020-06-30

## 2020-06-30 RX ORDER — HYDROCODONE BITARTRATE AND ACETAMINOPHEN 5; 325 MG/1; MG/1
1 TABLET ORAL EVERY 4 HOURS PRN
Status: DISCONTINUED | OUTPATIENT
Start: 2020-06-30 | End: 2020-07-03 | Stop reason: HOSPADM

## 2020-06-30 RX ORDER — ONDANSETRON 2 MG/ML
4 INJECTION INTRAMUSCULAR; INTRAVENOUS EVERY 6 HOURS PRN
Status: DISCONTINUED | OUTPATIENT
Start: 2020-06-30 | End: 2020-07-03 | Stop reason: HOSPADM

## 2020-06-30 RX ORDER — SODIUM CHLORIDE, SODIUM LACTATE, POTASSIUM CHLORIDE, CALCIUM CHLORIDE 600; 310; 30; 20 MG/100ML; MG/100ML; MG/100ML; MG/100ML
INJECTION, SOLUTION INTRAVENOUS CONTINUOUS
Status: DISCONTINUED | OUTPATIENT
Start: 2020-06-30 | End: 2020-07-03 | Stop reason: HOSPADM

## 2020-06-30 RX ORDER — SODIUM CHLORIDE 0.9 % (FLUSH) 0.9 %
10 SYRINGE (ML) INJECTION
Status: DISCONTINUED | OUTPATIENT
Start: 2020-06-30 | End: 2020-07-03 | Stop reason: HOSPADM

## 2020-06-30 RX ORDER — HYDROMORPHONE HYDROCHLORIDE 1 MG/ML
0.2 INJECTION, SOLUTION INTRAMUSCULAR; INTRAVENOUS; SUBCUTANEOUS EVERY 6 HOURS PRN
Status: DISCONTINUED | OUTPATIENT
Start: 2020-06-30 | End: 2020-07-03 | Stop reason: HOSPADM

## 2020-06-30 RX ADMIN — HYDROCODONE BITARTRATE AND ACETAMINOPHEN 1 TABLET: 5; 325 TABLET ORAL at 02:06

## 2020-06-30 RX ADMIN — SODIUM CHLORIDE, SODIUM LACTATE, POTASSIUM CHLORIDE, AND CALCIUM CHLORIDE: .6; .31; .03; .02 INJECTION, SOLUTION INTRAVENOUS at 01:06

## 2020-06-30 RX ADMIN — HYDROCODONE BITARTRATE AND ACETAMINOPHEN 1 TABLET: 5; 325 TABLET ORAL at 06:06

## 2020-06-30 RX ADMIN — SODIUM CHLORIDE, SODIUM LACTATE, POTASSIUM CHLORIDE, AND CALCIUM CHLORIDE: .6; .31; .03; .02 INJECTION, SOLUTION INTRAVENOUS at 09:06

## 2020-06-30 RX ADMIN — HYDROMORPHONE HYDROCHLORIDE 0.2 MG: 1 INJECTION, SOLUTION INTRAMUSCULAR; INTRAVENOUS; SUBCUTANEOUS at 11:06

## 2020-06-30 RX ADMIN — HYDROCODONE BITARTRATE AND ACETAMINOPHEN 1 TABLET: 5; 325 TABLET ORAL at 09:06

## 2020-06-30 RX ADMIN — HYDROCODONE BITARTRATE AND ACETAMINOPHEN 1 TABLET: 5; 325 TABLET ORAL at 01:06

## 2020-06-30 RX ADMIN — ONDANSETRON 4 MG: 2 INJECTION INTRAMUSCULAR; INTRAVENOUS at 03:06

## 2020-06-30 RX ADMIN — HYDROCODONE BITARTRATE AND ACETAMINOPHEN 1 TABLET: 5; 325 TABLET ORAL at 05:06

## 2020-06-30 RX ADMIN — ONDANSETRON 4 MG: 2 INJECTION INTRAMUSCULAR; INTRAVENOUS at 09:06

## 2020-06-30 RX ADMIN — ONDANSETRON 4 MG: 2 INJECTION INTRAMUSCULAR; INTRAVENOUS at 01:06

## 2020-06-30 NOTE — PLAN OF CARE
Patient admitted with Dx. Of SBO. AAOX4. Denies pain or any other discomfort at this time. Abdomen with 2 chronic wounds observed. Care plan  and fall prevention protocol discussed with patient in Malay language. Patient verbalized understanding.

## 2020-06-30 NOTE — PLAN OF CARE
VN cued into room to complete admit assessment, VIP model introduced, VN working alongside bedside treatment team.  Plan of care reviewed with patient. Patient verbalized understanding. Patient informed of fall risk and fall precautions, call light within reach, 2x bed rails. Patient notified to ask staff for assistance and pt verbalized complete understanding. Time allowed for questions. Will continue to monitor and intervene as needed.    Pt asked if there was anyone Polish speaking. Informed pt that we had [employer] that is free of charge to assist with any translation needs. Pt declined at this time. Informed pt that it is available at anytime if she needs assist with translation. Pt verbalizes undertanding

## 2020-06-30 NOTE — ED PROVIDER NOTES
Encounter Date: 2020       History     Chief Complaint   Patient presents with    Abdominal Pain     x1 week. reports nausea and vomiting. states had sx in December by Dr. Horton.      70-year-old past medical history of type 2 diabetes, hypertension, chronic colonic fistula presenting with 1 day of nausea, vomiting and abdominal pain.  Patient mentions pain is central cramping in nature nonradiating with no aggravating or alleviating factors.  Denies any diarrhea, constipation, fevers, sick contacts or travel.  Patient denies any abnormal purulent discharge or bleeding from chronic wound sites.        Review of patient's allergies indicates:   Allergen Reactions    Chlorhexidine gluconate Rash     Chlorhexidine/alcohol wipes. Rash and blistering.     Past Medical History:   Diagnosis Date    Diabetes mellitus     Diabetes mellitus, type 2     Hypertension      Past Surgical History:   Procedure Laterality Date     SECTION, CLASSIC      x2    CHOLECYSTECTOMY      COLON SURGERY      [facility]ospit[facility] in [place]    COLONOSCOPY N/A 2018    Procedure: COLONOSCOPY;  Surgeon: Gibran Aguayo MD;  Location: [facility];  Service: Endoscopy;  Laterality: N/A;    COLOSTOMY Left     CYSTOSCOPY WITH URETEROSCOPY, RETROGRADE PYELOGRAPHY, AND INSERTION OF STENT Left 2019    Procedure: CYSTOSCOPY, WITH RETROGRADE PYELOGRAM AND URETERAL STENT INSERTION with placement of a muir cath;  Surgeon: Ulisses Horton MD;  Location: [facility] OR;  Service: General;  Laterality: Left;    INCISION AND DRAINAGE OF ABSCESS N/A 2019    Procedure: INCISION AND DRAINAGE, ABSCESS;  Surgeon: Ulisses Horton MD;  Location: [facility] OR;  Service: General;  Laterality: N/A;    INCISION OF ABDOMINAL WALL N/A 8/10/2018    Procedure: INCISION, ABDOMINAL WALL;  Surgeon: Ulisses Horton MD;  Location: [facility] OR;  Service: General;  Laterality: N/A;    INCISIONAL HERNIA REPAIR Right      Family History   Problem  Relation Age of Onset    Stroke Mother     No Known Problems Father     Stroke Sister      Social History     Tobacco Use    Smoking status: Never Smoker    Smokeless tobacco: Never Used   Substance Use Topics    Alcohol use: No    Drug use: No     Review of Systems   Constitutional: Negative for fever.   HENT: Negative for congestion.    Respiratory: Negative for cough and shortness of breath.    Cardiovascular: Negative for chest pain.   Gastrointestinal: Positive for abdominal pain, nausea and vomiting.   Genitourinary: Negative for dysuria.   Skin: Negative for color change.   Neurological: Negative for dizziness.       Physical Exam     Initial Vitals [06/29/20 2004]   BP Pulse Resp Temp SpO2   (!) 154/89 85 (!) 24 98.5 °F (36.9 °C) 99 %      MAP       --         Physical Exam    Constitutional: She appears well-developed and well-nourished. She is not diaphoretic. No distress.   HENT:   Head: Normocephalic and atraumatic.   Eyes: Conjunctivae and EOM are normal. Pupils are equal, round, and reactive to light. Right eye exhibits no discharge. Left eye exhibits no discharge. No scleral icterus.   Neck: Normal range of motion. Neck supple.   Cardiovascular: Normal rate and regular rhythm. Exam reveals no friction rub.    No murmur heard.  Pulmonary/Chest: Breath sounds normal. No respiratory distress. She has no wheezes. She has no rales.   Abdominal: Soft. Bowel sounds are normal. She exhibits no distension. There is no abdominal tenderness. There is no rebound and no guarding.   Genitourinary:    Genitourinary Comments: 2 times chronic wounds in lower abdomen and left lower quadrant.  No overt normal purulent discharge no active bleeding.  No noted erythema  Moderate tenderness to palpation in central abdomen no guarding no rebound no rigidity     Musculoskeletal: Normal range of motion.   Neurological: She is alert and oriented to person, place, and time. No cranial nerve deficit or sensory deficit. GCS  score is 15. GCS eye subscore is 4. GCS verbal subscore is 5. GCS motor subscore is 6.   Skin: Skin is warm and dry. No erythema. No pallor.         ED Course   Procedures  Labs Reviewed   CBC W/ AUTO DIFFERENTIAL - Abnormal; Notable for the following components:       Result Value    Hemoglobin 9.4 (*)     Hematocrit 29.2 (*)     Mean Corpuscular Volume 72 (*)     Mean Corpuscular Hemoglobin 23.2 (*)     RDW 16.7 (*)     Platelets 459 (*)     MPV 8.4 (*)     Gran # (ANC) 7.8 (*)     Gran% 74.6 (*)     Lymph% 16.7 (*)     All other components within normal limits   COMPREHENSIVE METABOLIC PANEL - Abnormal; Notable for the following components:    Sodium 133 (*)     Potassium 3.4 (*)     Glucose 129 (*)     Albumin 3.2 (*)     ALT 8 (*)     eGFR if  59 (*)     eGFR if non  51 (*)     All other components within normal limits   URINALYSIS, REFLEX TO URINE CULTURE - Abnormal; Notable for the following components:    Specific Gravity, UA <=1.005 (*)     All other components within normal limits    Narrative:     Preferred Collection Type->Urine, Clean Catch  Specimen Source->Urine   LIPASE   SARS-COV-2 RNA AMPLIFICATION, QUAL          Imaging Results          CT Abdomen Pelvis With Contrast (Final result)  Result time 06/29/20 22:38:24    Final result by Chiquis Page MD (06/29/20 22:38:24)                 Impression:      Clumped area of dilated bowel loops in the lower abdomen and pelvis, which is likely related to adhesions resulting in small bowel obstruction.  Enteric anastomosis.  Recommend surgical consult for repair    Small tract of fluid, which possibly communicates from small bowel to an open ventral wound, or fistula.    Probable bilateral renal cysts.    Colonic diverticulosis.      Electronically signed by: Chiquis Page  Date:    06/29/2020  Time:    22:38             Narrative:    EXAMINATION:  CT OF ABDOMEN PELVIS WITH    CLINICAL HISTORY:  Abdominal pain, acute,  nonlocalized;Hx of chronic colinic fistula;    TECHNIQUE:  5 mm enhanced axial images were obtained from the lung bases through the greater trochanters.  Seventy-five mL of Omnipaque 350 was injected.    COMPARISON:  05/04/2020    FINDINGS:  The liver, spleen, pancreas, and adrenal glands are unremarkable. The gallbladder is surgically absent..    Left renal cyst is present.  A too small to characterize low attenuation lesion is seen right renal cortex, which may represent a tiny cyst.    There is a small fistulous tract which appears to be communicating from a small bowel loop to an open ventral wound (series 2 axial image 118).    There is no definite evidence for abdominal adenopathy or ascites.    Surgical changes or previous ostomy is seen in the left lower abdomen.  There are multiple dilated loops of small bowel.  Some of which have fluid levels.  The bowel loops appear to be somewhat matted or closely approximated with some decompressed bowel loops.  There is evidence of enteric anastomosis.  There are no pelvic masses or adenopathy.  There is colonic diverticulosis.    There is no free fluid in the pelvis.    There is mild bibasilar atelectasis                                 Medical Decision Making:   History:   Old Medical Records: I decided to obtain old medical records.  Initial Assessment:   70-year-old past medical history type 2 diabetes hypertension and chronic colonic fistula presenting with abdominal pain nausea vomiting.  /88.  PE noted for moderate tenderness to palpation  Differential Diagnosis:   Ddx includes but is not limited to:   Biliary colic, cholecystitis, gallstones, pancreatitis, hepatitis, cholangitis, appendicitis, diverticulitis, GERD, gastroenteritis, UTI , SBO    Clinical Tests:   Lab Tests: Ordered and Reviewed  Radiological Study: Ordered and Reviewed  ED Management:  Plan will obtain CBC, CMP, CT scan analgesia reassess.                   ED Course as of Jul 01 1816    Mon Jun 29, 2020 2248 Patient mentions abdominal pain mildly improved.  CT noted for possible small-bowel obstruction will consult surgery in continue to reassess.    [DC]   2258 I spoke with Dr. Horton.  About CT findings.  Plan for patient be admitted to observation in p.o. fluids in continue to reassess.    [DC]   Tue Jun 30, 2020   0001 Patient COVID negative discussed with patient plan for [facility] admission.  Has no further complaints currently.    [DC]      ED Course User Index  [DC] Pio Bowling Jr., MD                Clinical Impression:       ICD-10-CM ICD-9-CM   1. SBO (small bowel obstruction)  K56.609 560.9             ED Disposition Condition    Observation                           Pio Bowling Jr., MD  07/01/20 9103

## 2020-06-30 NOTE — ED NOTES
Pt give water for PO challenge. Pt tolerating well at this time, will continue to monitor.     Pt denies being able to urinate as she just went to the restroom after returning from CT.

## 2020-06-30 NOTE — PLAN OF CARE
Pt AAOx4. Complaints of abdominal pain controlled by PRN pain meds. PRN zofran given for nausea. NPO since admission. LR infusing at 100 ml/hr. Abdominal dressings changed by MARIANA Garcia. Blood glucose monitoring continued. Bed locked in lowest position, bed alarm set and call bell within reach. Will continue to monitor.

## 2020-06-30 NOTE — PLAN OF CARE
Pt awake and alert.  used to review plan of care.pt with complaints of pain  with Prn  Dilaudid and norco given around the clock Nausea without emesis but Prn Zofran given around the clock. Pt with IV fluids and remains NPO. Blood sugar checked without the need for sliding scale to be given.

## 2020-06-30 NOTE — ED NOTES
Pt able to tolerate PO fluids without distress. Pt updated on plan of care and is agreeable at this time.

## 2020-06-30 NOTE — PLAN OF CARE
VN rounds: VN cued into pt's room with pt's permission.Pt is Gibraltarian speaking and VN is able to communicate with pt. Pt resting in bed. VN instructed pt to call for assistance. Pt aware and agreeable. Allowed time for questions. Pt states she is comfortable at this present time. NAD noted. Will cont to be available as needed.

## 2020-06-30 NOTE — H&P
Chief Complaint   Patient presents with    Abdominal Pain       x1 week. reports nausea and vomiting. states had sx in December by Dr. Horton.      70-year-old past medical history of type 2 diabetes, hypertension, chronic colonic fistula presenting with 1 day of nausea, vomiting and abdominal pain.  Patient mentions pain is central cramping in nature nonradiating with no aggravating or alleviating factors.  Denies any diarrhea, constipation, fevers, sick contacts or travel.  Patient denies any abnormal purulent discharge or bleeding from chronic wound sites.                 Review of patient's allergies indicates:   Allergen Reactions    Chlorhexidine gluconate Rash       Chlorhexidine/alcohol wipes. Rash and blistering.          Past Medical History:   Diagnosis Date    Diabetes mellitus      Diabetes mellitus, type 2      Encounter for blood transfusion      Hypertension             Past Surgical History:   Procedure Laterality Date     SECTION, CLASSIC         x2    CHOLECYSTECTOMY       COLON SURGERY        [facility]    COLONOSCOPY N/A 2018     Procedure: COLONOSCOPY;  Surgeon: Gibran Aguayo MD;  Location: [facility];  Service: Endoscopy;  Laterality: N/A;    COLOSTOMY Left     CYSTOSCOPY WITH URETEROSCOPY, RETROGRADE PYELOGRAPHY, AND INSERTION OF STENT Left 2019     Procedure: CYSTOSCOPY, WITH RETROGRADE PYELOGRAM AND URETERAL STENT INSERTION with placement of a muir cath;  Surgeon: Ulisses Horton MD;  Location: [facility] OR;  Service: General;  Laterality: Left;    INCISION AND DRAINAGE OF ABSCESS N/A 2019     Procedure: INCISION AND DRAINAGE, ABSCESS;  Surgeon: Ulisses Horton MD;  Location: [facility] OR;  Service: General;  Laterality: N/A;    INCISION OF ABDOMINAL WALL N/A 8/10/2018     Procedure: INCISION, ABDOMINAL WALL;  Surgeon: Ulisses Horton MD;  Location: [facility] OR;  Service: General;  Laterality: N/A;    INCISIONAL HERNIA REPAIR Right             Family History   Problem Relation Age of Onset    Stroke Mother      No Known Problems Father      Stroke Sister       Social History           Tobacco Use    Smoking status: Never Smoker    Smokeless tobacco: Never Used   Substance Use Topics    Alcohol use: No    Drug use: No     Review of Systems   Constitutional: Negative for fever.   HENT: Negative for congestion.    Respiratory: Negative for cough and shortness of breath.    Cardiovascular: Negative for chest pain.   Gastrointestinal: Positive for abdominal pain, nausea and vomiting.   Genitourinary: Negative for dysuria.   Skin: Negative for color change.   Neurological: Negative for dizziness.         Physical Exam      Initial Vitals [06/29/20 2004]   BP Pulse Resp Temp SpO2   (!) 154/89 85 (!) 24 98.5 °F (36.9 °C) 99 %       MAP           --             Physical Exam     Constitutional: She appears well-developed and well-nourished. She is not diaphoretic. No distress.   HENT:   Head: Normocephalic and atraumatic.   Eyes: Conjunctivae and EOM are normal. Pupils are equal, round, and reactive to light. Right eye exhibits no discharge. Left eye exhibits no discharge. No scleral icterus.   Neck: Normal range of motion. Neck supple.   Cardiovascular: Normal rate and regular rhythm. Exam reveals no friction rub.    No murmur heard.  Pulmonary/Chest: Breath sounds normal. No respiratory distress. She has no wheezes. She has no rales.   Abdominal: Soft. Bowel sounds are normal. She exhibits no distension. There is no abdominal tenderness. There is no rebound and no guarding.   Genitourinary:    Genitourinary Comments: 2 times chronic wounds in lower abdomen and left lower quadrant.  No overt normal purulent discharge no active bleeding.  No noted erythema  Moderate tenderness to palpation in central abdomen no guarding no rebound no rigidity     Musculoskeletal: Normal range of motion.   Neurological: She is alert and oriented to person, place,  and time. No cranial nerve deficit or sensory deficit. GCS score is 15. GCS eye subscore is 4. GCS verbal subscore is 5. GCS motor subscore is 6.   Skin: Skin is warm and dry. No erythema. No pallor.               Procedures        Labs Reviewed   CBC W/ AUTO DIFFERENTIAL - Abnormal; Notable for the following components:       Result Value      Hemoglobin 9.4 (*)       Hematocrit 29.2 (*)       Mean Corpuscular Volume 72 (*)       Mean Corpuscular Hemoglobin 23.2 (*)       RDW 16.7 (*)       Platelets 459 (*)       MPV 8.4 (*)       Gran # (ANC) 7.8 (*)       Gran% 74.6 (*)       Lymph% 16.7 (*)       All other components within normal limits   COMPREHENSIVE METABOLIC PANEL - Abnormal; Notable for the following components:     Sodium 133 (*)       Potassium 3.4 (*)       Glucose 129 (*)       Albumin 3.2 (*)       ALT 8 (*)       eGFR if  59 (*)       eGFR if non  51 (*)       All other components within normal limits   LIPASE          Imaging Results    None         Imp: partial small bowel obstruction , chronic colo cutaneous fistula, dm , htn     Plan: Admit

## 2020-06-30 NOTE — PLAN OF CARE
TN met with pt   TN used services of  [facility] #[id number]   pt lives with jacey Wilde  [phone];  daughter in law and grandchildren   pt will have help at home at discharge       pt has had had HH in the past with [facility] --pt does NOT want HH at d/c due to risk of Covid-19       dx:  pSBO;  hx of Colu-cutaneous fistula   now with n/v/abd pain    Future Appointments   Date Time Provider Department Center   7/1/2020  8:30 AM Ulisses Horton MD [facility] WOUND [facility]   7/1/2020 11:20 AM Idris Pop Jr., MD [facility] GENSUR [facility]        06/30/20 1451   Discharge Assessment   Assessment Type Discharge Planning Assessment   Confirmed/corrected address and phone number on facesheet? Yes   Assessment information obtained from? Patient;Medical Record   Expected Length of Stay (days) 3   Communicated expected length of stay with patient/caregiver yes   Prior to hospitilization cognitive status: Alert/Oriented   Prior to hospitalization functional status: Independent   Current cognitive status: Alert/Oriented   Current Functional Status: Independent   Lives With child(felicitas), adult   Able to Return to Prior Arrangements yes   Is patient able to care for self after discharge? Yes   Who are your caregiver(s) and their phone number(s)? jacey Duke [phone]   Patient's perception of discharge disposition home or selfcare   Readmission Within the Last 30 Days no previous admission in last 30 days   Patient currently being followed by outpatient case management? No   Patient currently receives any other outside agency services? No   Equipment Currently Used at Home none   Do you have any problems affording any of your prescribed medications? No  ([facility])   Is the patient taking medications as prescribed? yes   Does the patient have transportation home? Yes   Transportation Anticipated family or friend will provide   Does the patient receive services at the [facility]?  No   Discharge Plan A Home   DME Needed Upon Discharge  none   Patient/Family in Agreement with Plan yes

## 2020-06-30 NOTE — ED NOTES
Pt tolerated PO fluids well at this time. Physician at bedside discussing plan of care, pt agreeable at this time.

## 2020-07-01 LAB
ANION GAP SERPL CALC-SCNC: 8 MMOL/L (ref 8–16)
BASOPHILS # BLD AUTO: 0.02 K/UL (ref 0–0.2)
BASOPHILS NFR BLD: 0.3 % (ref 0–1.9)
BUN SERPL-MCNC: 9 MG/DL (ref 8–23)
CALCIUM SERPL-MCNC: 8.9 MG/DL (ref 8.7–10.5)
CHLORIDE SERPL-SCNC: 101 MMOL/L (ref 95–110)
CO2 SERPL-SCNC: 29 MMOL/L (ref 23–29)
CREAT SERPL-MCNC: 1 MG/DL (ref 0.5–1.4)
DIFFERENTIAL METHOD: ABNORMAL
EOSINOPHIL # BLD AUTO: 0.2 K/UL (ref 0–0.5)
EOSINOPHIL NFR BLD: 2.6 % (ref 0–8)
ERYTHROCYTE [DISTWIDTH] IN BLOOD BY AUTOMATED COUNT: 16.8 % (ref 11.5–14.5)
EST. GFR  (AFRICAN AMERICAN): >60 ML/MIN/1.73 M^2
EST. GFR  (NON AFRICAN AMERICAN): 58 ML/MIN/1.73 M^2
GLUCOSE SERPL-MCNC: 130 MG/DL (ref 70–110)
HCT VFR BLD AUTO: 27.9 % (ref 37–48.5)
HGB BLD-MCNC: 8.6 G/DL (ref 12–16)
IMM GRANULOCYTES # BLD AUTO: 0.01 K/UL (ref 0–0.04)
IMM GRANULOCYTES NFR BLD AUTO: 0.2 % (ref 0–0.5)
LYMPHOCYTES # BLD AUTO: 1.3 K/UL (ref 1–4.8)
LYMPHOCYTES NFR BLD: 21.9 % (ref 18–48)
MCH RBC QN AUTO: 22.7 PG (ref 27–31)
MCHC RBC AUTO-ENTMCNC: 30.8 G/DL (ref 32–36)
MCV RBC AUTO: 74 FL (ref 82–98)
MONOCYTES # BLD AUTO: 0.3 K/UL (ref 0.3–1)
MONOCYTES NFR BLD: 5.4 % (ref 4–15)
NEUTROPHILS # BLD AUTO: 4.1 K/UL (ref 1.8–7.7)
NEUTROPHILS NFR BLD: 69.6 % (ref 38–73)
NRBC BLD-RTO: 0 /100 WBC
PLATELET # BLD AUTO: 338 K/UL (ref 150–350)
PMV BLD AUTO: 7.7 FL (ref 9.2–12.9)
POCT GLUCOSE: 101 MG/DL (ref 70–110)
POCT GLUCOSE: 112 MG/DL (ref 70–110)
POCT GLUCOSE: 123 MG/DL (ref 70–110)
POCT GLUCOSE: 134 MG/DL (ref 70–110)
POTASSIUM SERPL-SCNC: 3.5 MMOL/L (ref 3.5–5.1)
RBC # BLD AUTO: 3.79 M/UL (ref 4–5.4)
SODIUM SERPL-SCNC: 138 MMOL/L (ref 136–145)
WBC # BLD AUTO: 5.88 K/UL (ref 3.9–12.7)

## 2020-07-01 PROCEDURE — 36415 COLL VENOUS BLD VENIPUNCTURE: CPT

## 2020-07-01 PROCEDURE — 94761 N-INVAS EAR/PLS OXIMETRY MLT: CPT

## 2020-07-01 PROCEDURE — 85025 COMPLETE CBC W/AUTO DIFF WBC: CPT

## 2020-07-01 PROCEDURE — 11000001 HC ACUTE MED/SURG PRIVATE ROOM

## 2020-07-01 PROCEDURE — 25000003 PHARM REV CODE 250: Performed by: SURGERY

## 2020-07-01 PROCEDURE — 63600175 PHARM REV CODE 636 W HCPCS: Performed by: SURGERY

## 2020-07-01 PROCEDURE — 80048 BASIC METABOLIC PNL TOTAL CA: CPT

## 2020-07-01 RX ADMIN — ONDANSETRON 4 MG: 2 INJECTION INTRAMUSCULAR; INTRAVENOUS at 12:07

## 2020-07-01 RX ADMIN — HYDROCODONE BITARTRATE AND ACETAMINOPHEN 1 TABLET: 5; 325 TABLET ORAL at 06:07

## 2020-07-01 RX ADMIN — HYDROMORPHONE HYDROCHLORIDE 0.2 MG: 1 INJECTION, SOLUTION INTRAMUSCULAR; INTRAVENOUS; SUBCUTANEOUS at 10:07

## 2020-07-01 RX ADMIN — HYDROCODONE BITARTRATE AND ACETAMINOPHEN 1 TABLET: 5; 325 TABLET ORAL at 12:07

## 2020-07-01 RX ADMIN — HYDROCODONE BITARTRATE AND ACETAMINOPHEN 1 TABLET: 5; 325 TABLET ORAL at 09:07

## 2020-07-01 RX ADMIN — ONDANSETRON 4 MG: 2 INJECTION INTRAMUSCULAR; INTRAVENOUS at 06:07

## 2020-07-01 RX ADMIN — SODIUM CHLORIDE, SODIUM LACTATE, POTASSIUM CHLORIDE, AND CALCIUM CHLORIDE: .6; .31; .03; .02 INJECTION, SOLUTION INTRAVENOUS at 12:07

## 2020-07-01 RX ADMIN — HYDROCODONE BITARTRATE AND ACETAMINOPHEN 1 TABLET: 5; 325 TABLET ORAL at 05:07

## 2020-07-01 NOTE — PROGRESS NOTES
"surgeru folloew up  BP (!) 158/87 (Patient Position: Sitting)   Pulse 73   Temp 97.8 °F (36.6 °C) (Oral)   Resp 18   Ht 5' 5" (1.651 m)   Wt 69.5 kg (153 lb 3.5 oz)   SpO2 96%   Breastfeeding No   BMI 25.50 kg/m²   I/O last 3 completed shifts:  In: 3515 [P.O.:665; I.V.:2850]  Out: 1450 [Urine:1450]  No intake/output data recorded.  Recent Results (from the past 336 hour(s))   CBC auto differential    Collection Time: 06/29/20  8:38 PM   Result Value Ref Range    WBC 10.46 3.90 - 12.70 K/uL    Hemoglobin 9.4 (L) 12.0 - 16.0 g/dL    Hematocrit 29.2 (L) 37.0 - 48.5 %    Platelets 459 (H) 150 - 350 K/uL     No results found for this or any previous visit (from the past 336 hour(s)).  Feeling patricio rno more abdominal pain had bowel movement, advance diet .  "

## 2020-07-02 LAB
POCT GLUCOSE: 101 MG/DL (ref 70–110)
POCT GLUCOSE: 112 MG/DL (ref 70–110)
POCT GLUCOSE: 153 MG/DL (ref 70–110)
POCT GLUCOSE: 184 MG/DL (ref 70–110)

## 2020-07-02 PROCEDURE — 25000003 PHARM REV CODE 250: Performed by: SURGERY

## 2020-07-02 PROCEDURE — 25500020 PHARM REV CODE 255: Performed by: SURGERY

## 2020-07-02 PROCEDURE — 11000001 HC ACUTE MED/SURG PRIVATE ROOM

## 2020-07-02 PROCEDURE — 63600175 PHARM REV CODE 636 W HCPCS: Performed by: SURGERY

## 2020-07-02 PROCEDURE — 94761 N-INVAS EAR/PLS OXIMETRY MLT: CPT

## 2020-07-02 RX ORDER — METOCLOPRAMIDE 10 MG/1
10 TABLET ORAL
Status: DISCONTINUED | OUTPATIENT
Start: 2020-07-03 | End: 2020-07-03 | Stop reason: HOSPADM

## 2020-07-02 RX ADMIN — DIATRIZOATE MEGLUMINE AND DIATRIZOATE SODIUM 240 ML: 660; 100 LIQUID ORAL; RECTAL at 02:07

## 2020-07-02 RX ADMIN — HYDROMORPHONE HYDROCHLORIDE 0.2 MG: 1 INJECTION, SOLUTION INTRAMUSCULAR; INTRAVENOUS; SUBCUTANEOUS at 06:07

## 2020-07-02 RX ADMIN — HYDROCODONE BITARTRATE AND ACETAMINOPHEN 1 TABLET: 5; 325 TABLET ORAL at 05:07

## 2020-07-02 RX ADMIN — ONDANSETRON 4 MG: 2 INJECTION INTRAMUSCULAR; INTRAVENOUS at 05:07

## 2020-07-02 RX ADMIN — HYDROCODONE BITARTRATE AND ACETAMINOPHEN 1 TABLET: 5; 325 TABLET ORAL at 11:07

## 2020-07-02 RX ADMIN — HYDROCODONE BITARTRATE AND ACETAMINOPHEN 1 TABLET: 5; 325 TABLET ORAL at 10:07

## 2020-07-02 RX ADMIN — ONDANSETRON 4 MG: 2 INJECTION INTRAMUSCULAR; INTRAVENOUS at 11:07

## 2020-07-02 RX ADMIN — HYDROMORPHONE HYDROCHLORIDE 0.2 MG: 1 INJECTION, SOLUTION INTRAMUSCULAR; INTRAVENOUS; SUBCUTANEOUS at 11:07

## 2020-07-02 RX ADMIN — HYDROCODONE BITARTRATE AND ACETAMINOPHEN 1 TABLET: 5; 325 TABLET ORAL at 03:07

## 2020-07-02 RX ADMIN — SODIUM CHLORIDE, SODIUM LACTATE, POTASSIUM CHLORIDE, AND CALCIUM CHLORIDE: .6; .31; .03; .02 INJECTION, SOLUTION INTRAVENOUS at 03:07

## 2020-07-02 RX ADMIN — ONDANSETRON 4 MG: 2 INJECTION INTRAMUSCULAR; INTRAVENOUS at 07:07

## 2020-07-02 NOTE — PLAN OF CARE
Pt vitals were maintained. Pt complained of pain and prn med were given. Pt had some moderate drainage coming from both wound sites dressings reapplied. Pt has been NPO since midnight. Pt had some mild nausea. WCTM

## 2020-07-02 NOTE — PROGRESS NOTES
"Surgery follow up  BP (!) 155/78 (BP Location: Left arm, Patient Position: Lying)   Pulse 66   Temp 98.2 °F (36.8 °C) (Oral)   Resp 14   Ht 5' 5" (1.651 m)   Wt 69.5 kg (153 lb 3.5 oz)   SpO2 97%   Breastfeeding No   BMI 25.50 kg/m²   I/O last 3 completed shifts:  In: 3065 [P.O.:665; I.V.:2400]  Out: 1450 [Urine:1450]  No intake/output data recorded.  Recent Results (from the past 336 hour(s))   CBC auto differential    Collection Time: 07/01/20 10:40 AM   Result Value Ref Range    WBC 5.88 3.90 - 12.70 K/uL    Hemoglobin 8.6 (L) 12.0 - 16.0 g/dL    Hematocrit 27.9 (L) 37.0 - 48.5 %    Platelets 338 150 - 350 K/uL   CBC auto differential    Collection Time: 06/29/20  8:38 PM   Result Value Ref Range    WBC 10.46 3.90 - 12.70 K/uL    Hemoglobin 9.4 (L) 12.0 - 16.0 g/dL    Hematocrit 29.2 (L) 37.0 - 48.5 %    Platelets 459 (H) 150 - 350 K/uL     Recent Results (from the past 336 hour(s))   Basic metabolic panel    Collection Time: 07/01/20 10:40 AM   Result Value Ref Range    Sodium 138 136 - 145 mmol/L    Potassium 3.5 3.5 - 5.1 mmol/L    Chloride 101 95 - 110 mmol/L    CO2 29 23 - 29 mmol/L    BUN, Bld 9 8 - 23 mg/dL    Creatinine 1.0 0.5 - 1.4 mg/dL    Calcium 8.9 8.7 - 10.5 mg/dL    Anion Gap 8 8 - 16 mmol/L   pain better , moved bowels will get UGI with sbfthru    "

## 2020-07-02 NOTE — PLAN OF CARE
VN note:   Pt's chart, labs and vital signs reviewed, will be available to intervene if needed.

## 2020-07-02 NOTE — PROGRESS NOTES
"Surgery follow up  BP (!) 172/82 (Patient Position: Lying)   Pulse 66   Temp 97.8 °F (36.6 °C) (Oral)   Resp 17   Ht 5' 5" (1.651 m)   Wt 70.4 kg (155 lb 3.3 oz)   SpO2 98%   Breastfeeding No   BMI 25.83 kg/m²   I/O last 3 completed shifts:  In: 2825 [P.O.:425; I.V.:2400]  Out: -   No intake/output data recorded.  Recent Results (from the past 336 hour(s))   CBC auto differential    Collection Time: 07/01/20 10:40 AM   Result Value Ref Range    WBC 5.88 3.90 - 12.70 K/uL    Hemoglobin 8.6 (L) 12.0 - 16.0 g/dL    Hematocrit 27.9 (L) 37.0 - 48.5 %    Platelets 338 150 - 350 K/uL   CBC auto differential    Collection Time: 06/29/20  8:38 PM   Result Value Ref Range    WBC 10.46 3.90 - 12.70 K/uL    Hemoglobin 9.4 (L) 12.0 - 16.0 g/dL    Hematocrit 29.2 (L) 37.0 - 48.5 %    Platelets 459 (H) 150 - 350 K/uL     Recent Results (from the past 336 hour(s))   Basic metabolic panel    Collection Time: 07/01/20 10:40 AM   Result Value Ref Range    Sodium 138 136 - 145 mmol/L    Potassium 3.5 3.5 - 5.1 mmol/L    Chloride 101 95 - 110 mmol/L    CO2 29 23 - 29 mmol/L    BUN, Bld 9 8 - 23 mg/dL    Creatinine 1.0 0.5 - 1.4 mg/dL    Calcium 8.9 8.7 - 10.5 mg/dL    Anion Gap 8 8 - 16 mmol/L   gi series noted , no point of obstruction , dilated stomach, gastroparesis will start on po reglan.  "

## 2020-07-02 NOTE — PLAN OF CARE
Discussed with radiology ashley De Dios patient's diet status prior to having an upper GI small bowel follow through, tech stated patient should be NPO before test

## 2020-07-02 NOTE — PLAN OF CARE
[place] notes pt for UGI and SBFT today   Future Appointments   Date Time Provider Department Center   7/8/2020  8:30 AM Ulisses Horton MD [facility] WOUND [facility]       pt lives with son Andry  [phone];  daughter in law and grandchildren   pt will have help at home at discharge        per pt - does NOT want HH at d/c due to risk of Covid-19 infection.              07/02/20 1208   Discharge Reassessment   Assessment Type Discharge Planning Reassessment

## 2020-07-03 VITALS
BODY MASS INDEX: 25.85 KG/M2 | TEMPERATURE: 98 F | HEART RATE: 68 BPM | SYSTOLIC BLOOD PRESSURE: 146 MMHG | OXYGEN SATURATION: 97 % | DIASTOLIC BLOOD PRESSURE: 79 MMHG | WEIGHT: 155.19 LBS | RESPIRATION RATE: 18 BRPM | HEIGHT: 65 IN

## 2020-07-03 LAB
POCT GLUCOSE: 105 MG/DL (ref 70–110)
POCT GLUCOSE: 131 MG/DL (ref 70–110)

## 2020-07-03 PROCEDURE — 94761 N-INVAS EAR/PLS OXIMETRY MLT: CPT

## 2020-07-03 PROCEDURE — 25000003 PHARM REV CODE 250: Performed by: SURGERY

## 2020-07-03 PROCEDURE — 63600175 PHARM REV CODE 636 W HCPCS: Performed by: SURGERY

## 2020-07-03 RX ORDER — HYDROCODONE BITARTRATE AND ACETAMINOPHEN 5; 325 MG/1; MG/1
1 TABLET ORAL EVERY 4 HOURS PRN
Qty: 24 TABLET | Refills: 0 | Status: ON HOLD | OUTPATIENT
Start: 2020-07-03 | End: 2020-09-16 | Stop reason: HOSPADM

## 2020-07-03 RX ADMIN — HYDROCODONE BITARTRATE AND ACETAMINOPHEN 1 TABLET: 5; 325 TABLET ORAL at 09:07

## 2020-07-03 RX ADMIN — HYDROMORPHONE HYDROCHLORIDE 0.2 MG: 1 INJECTION, SOLUTION INTRAMUSCULAR; INTRAVENOUS; SUBCUTANEOUS at 04:07

## 2020-07-03 RX ADMIN — SODIUM CHLORIDE, SODIUM LACTATE, POTASSIUM CHLORIDE, AND CALCIUM CHLORIDE: .6; .31; .03; .02 INJECTION, SOLUTION INTRAVENOUS at 04:07

## 2020-07-03 RX ADMIN — ONDANSETRON 4 MG: 2 INJECTION INTRAMUSCULAR; INTRAVENOUS at 09:07

## 2020-07-03 RX ADMIN — METOCLOPRAMIDE 10 MG: 10 TABLET ORAL at 05:07

## 2020-07-03 RX ADMIN — METOCLOPRAMIDE 10 MG: 10 TABLET ORAL at 11:07

## 2020-07-03 NOTE — NURSING
Pt AVS printed and delivered to patient. IV discontinued. AVS thoroughly reviewed with patient with  services used for all forms of communication. Wound care supplied given to patient for home wound care. All medications reviewed with patient with name, dose, freq, reason, and side effects. Pt awaiting for transport services. All questions answered with no further questions at this time. Printed prescription handed to patient.

## 2020-07-03 NOTE — PROGRESS NOTES
"Surgery follow up  BP (!) 146/79 (BP Location: Right arm, Patient Position: Lying)   Pulse 68   Temp 97.8 °F (36.6 °C) (Oral)   Resp 18   Ht 5' 5" (1.651 m)   Wt 70.4 kg (155 lb 3.3 oz)   SpO2 97%   Breastfeeding No   BMI 25.83 kg/m²   I/O last 3 completed shifts:  In: 1000 [I.V.:1000]  Out: -   I/O this shift:  In: 125 [P.O.:125]  Out: 300 [Urine:300]  Recent Results (from the past 336 hour(s))   CBC auto differential    Collection Time: 07/01/20 10:40 AM   Result Value Ref Range    WBC 5.88 3.90 - 12.70 K/uL    Hemoglobin 8.6 (L) 12.0 - 16.0 g/dL    Hematocrit 27.9 (L) 37.0 - 48.5 %    Platelets 338 150 - 350 K/uL   CBC auto differential    Collection Time: 06/29/20  8:38 PM   Result Value Ref Range    WBC 10.46 3.90 - 12.70 K/uL    Hemoglobin 9.4 (L) 12.0 - 16.0 g/dL    Hematocrit 29.2 (L) 37.0 - 48.5 %    Platelets 459 (H) 150 - 350 K/uL   ding better , tolerating diet   Discharge home today with wound care follow up on Wednesday.  "

## 2020-07-03 NOTE — PLAN OF CARE
Patient resting in bed, AAOX4. IVF infusing as ordered. Medications administered as ordered. Patient complained of some pain overnight, PRN medication administered. Dressing to abdomen remain intact. Patient ambulating around room independently, safety maintained. Encouraged to call with needs or concerns. Will continue to monitor.

## 2020-07-05 ENCOUNTER — HOSPITAL ENCOUNTER (EMERGENCY)
Facility: HOSPITAL | Age: 70
Discharge: HOME | End: 2020-07-05
Attending: EMERGENCY MEDICINE
Payer: MEDICARE

## 2020-07-05 VITALS
DIASTOLIC BLOOD PRESSURE: 77 MMHG | RESPIRATION RATE: 18 BRPM | HEART RATE: 82 BPM | OXYGEN SATURATION: 97 % | SYSTOLIC BLOOD PRESSURE: 125 MMHG | TEMPERATURE: 98 F

## 2020-07-05 DIAGNOSIS — R10.9 ABDOMINAL PAIN: ICD-10-CM

## 2020-07-05 DIAGNOSIS — K63.2 FISTULA OF INTESTINE: Primary | ICD-10-CM

## 2020-07-05 DIAGNOSIS — E11.43 DIABETIC GASTROPARESIS: ICD-10-CM

## 2020-07-05 DIAGNOSIS — K31.84 DIABETIC GASTROPARESIS: ICD-10-CM

## 2020-07-05 LAB
ALBUMIN SERPL BCP-MCNC: 3.3 G/DL (ref 3.5–5.2)
ALP SERPL-CCNC: 69 U/L (ref 55–135)
ALT SERPL W/O P-5'-P-CCNC: 10 U/L (ref 10–44)
ANION GAP SERPL CALC-SCNC: 13 MMOL/L (ref 8–16)
AST SERPL-CCNC: 16 U/L (ref 10–40)
BASOPHILS # BLD AUTO: 0.02 K/UL (ref 0–0.2)
BASOPHILS NFR BLD: 0.2 % (ref 0–1.9)
BILIRUB SERPL-MCNC: 0.3 MG/DL (ref 0.1–1)
BUN SERPL-MCNC: 11 MG/DL (ref 8–23)
CALCIUM SERPL-MCNC: 8.9 MG/DL (ref 8.7–10.5)
CHLORIDE SERPL-SCNC: 97 MMOL/L (ref 95–110)
CO2 SERPL-SCNC: 26 MMOL/L (ref 23–29)
CREAT SERPL-MCNC: 1.1 MG/DL (ref 0.5–1.4)
DIFFERENTIAL METHOD: ABNORMAL
EOSINOPHIL # BLD AUTO: 0.2 K/UL (ref 0–0.5)
EOSINOPHIL NFR BLD: 2.3 % (ref 0–8)
ERYTHROCYTE [DISTWIDTH] IN BLOOD BY AUTOMATED COUNT: 16.6 % (ref 11.5–14.5)
EST. GFR  (AFRICAN AMERICAN): 59 ML/MIN/1.73 M^2
EST. GFR  (NON AFRICAN AMERICAN): 51 ML/MIN/1.73 M^2
GLUCOSE SERPL-MCNC: 147 MG/DL (ref 70–110)
HCT VFR BLD AUTO: 31 % (ref 37–48.5)
HGB BLD-MCNC: 9.7 G/DL (ref 12–16)
IMM GRANULOCYTES # BLD AUTO: 0.03 K/UL (ref 0–0.04)
IMM GRANULOCYTES NFR BLD AUTO: 0.3 % (ref 0–0.5)
LACTATE SERPL-SCNC: 1 MMOL/L (ref 0.5–2.2)
LYMPHOCYTES # BLD AUTO: 1.4 K/UL (ref 1–4.8)
LYMPHOCYTES NFR BLD: 14.8 % (ref 18–48)
MCH RBC QN AUTO: 22.7 PG (ref 27–31)
MCHC RBC AUTO-ENTMCNC: 31.3 G/DL (ref 32–36)
MCV RBC AUTO: 73 FL (ref 82–98)
MONOCYTES # BLD AUTO: 0.4 K/UL (ref 0.3–1)
MONOCYTES NFR BLD: 4.4 % (ref 4–15)
NEUTROPHILS # BLD AUTO: 7.1 K/UL (ref 1.8–7.7)
NEUTROPHILS NFR BLD: 78 % (ref 38–73)
NRBC BLD-RTO: 0 /100 WBC
PLATELET # BLD AUTO: 423 K/UL (ref 150–350)
PMV BLD AUTO: 8.1 FL (ref 9.2–12.9)
POTASSIUM SERPL-SCNC: 3.2 MMOL/L (ref 3.5–5.1)
PROT SERPL-MCNC: 8.5 G/DL (ref 6–8.4)
RBC # BLD AUTO: 4.27 M/UL (ref 4–5.4)
SODIUM SERPL-SCNC: 136 MMOL/L (ref 136–145)
WBC # BLD AUTO: 9.16 K/UL (ref 3.9–12.7)

## 2020-07-05 PROCEDURE — 85025 COMPLETE CBC W/AUTO DIFF WBC: CPT

## 2020-07-05 PROCEDURE — 83605 ASSAY OF LACTIC ACID: CPT

## 2020-07-05 PROCEDURE — 87077 CULTURE AEROBIC IDENTIFY: CPT | Mod: 59

## 2020-07-05 PROCEDURE — 87186 SC STD MICRODIL/AGAR DIL: CPT

## 2020-07-05 PROCEDURE — 63600175 PHARM REV CODE 636 W HCPCS: Performed by: EMERGENCY MEDICINE

## 2020-07-05 PROCEDURE — 25000003 PHARM REV CODE 250: Performed by: EMERGENCY MEDICINE

## 2020-07-05 PROCEDURE — 99285 EMERGENCY DEPT VISIT HI MDM: CPT | Mod: 25

## 2020-07-05 PROCEDURE — 96375 TX/PRO/DX INJ NEW DRUG ADDON: CPT

## 2020-07-05 PROCEDURE — 96374 THER/PROPH/DIAG INJ IV PUSH: CPT

## 2020-07-05 PROCEDURE — 80053 COMPREHEN METABOLIC PANEL: CPT

## 2020-07-05 PROCEDURE — 87040 BLOOD CULTURE FOR BACTERIA: CPT | Mod: 59

## 2020-07-05 PROCEDURE — 25500020 PHARM REV CODE 255: Performed by: EMERGENCY MEDICINE

## 2020-07-05 PROCEDURE — 87070 CULTURE OTHR SPECIMN AEROBIC: CPT

## 2020-07-05 RX ORDER — METOCLOPRAMIDE HYDROCHLORIDE 5 MG/ML
10 INJECTION INTRAMUSCULAR; INTRAVENOUS
Status: COMPLETED | OUTPATIENT
Start: 2020-07-05 | End: 2020-07-05

## 2020-07-05 RX ORDER — ONDANSETRON 8 MG/1
8 TABLET, ORALLY DISINTEGRATING ORAL
Status: COMPLETED | OUTPATIENT
Start: 2020-07-05 | End: 2020-07-05

## 2020-07-05 RX ORDER — MORPHINE SULFATE 4 MG/ML
4 INJECTION, SOLUTION INTRAMUSCULAR; INTRAVENOUS
Status: COMPLETED | OUTPATIENT
Start: 2020-07-05 | End: 2020-07-05

## 2020-07-05 RX ORDER — ONDANSETRON 2 MG/ML
4 INJECTION INTRAMUSCULAR; INTRAVENOUS
Status: DISCONTINUED | OUTPATIENT
Start: 2020-07-05 | End: 2020-07-06 | Stop reason: HOSPADM

## 2020-07-05 RX ADMIN — IOHEXOL 100 ML: 350 INJECTION, SOLUTION INTRAVENOUS at 05:07

## 2020-07-05 RX ADMIN — MORPHINE SULFATE 4 MG: 4 INJECTION INTRAVENOUS at 06:07

## 2020-07-05 RX ADMIN — METOCLOPRAMIDE 10 MG: 5 INJECTION, SOLUTION INTRAMUSCULAR; INTRAVENOUS at 06:07

## 2020-07-05 RX ADMIN — ONDANSETRON 8 MG: 8 TABLET, ORALLY DISINTEGRATING ORAL at 03:07

## 2020-07-05 NOTE — ED NOTES
Midline wound cleaned with normal saline then swabbed for culture. Redressed with 4x4 gauze abd pads and tape.

## 2020-07-05 NOTE — ED NOTES
Abd wound cleaned with normal saline and gauze. LLQ wound swabbed for culture. Wounds redressed with 4x4 gauze abd pad and tape.

## 2020-07-05 NOTE — DISCHARGE SUMMARY
[facility]  General Surgery  Discharge Summary      Patient Name: Azucena Morrison  MRN: [id number]  Admission Date: 6/29/2020  Hospital Length of Stay: 3 days  Discharge Date and Time: 7/3/2020 12:45 PM  Attending Physician: Molly att. providers found   Discharging Provider: Ulisses Horton MD  Primary Care Provider: Pj Sanches MD     HPI:  70-year-old female admitted for through the emergency room with history of abdominal pain associated nausea and vomiting has had bowel movement the night before patient denied any fever chills patient's lower diabetic with multiple medical problems hypertension status post multiple abdominal surgeries including colon resection and colostomy repair ventral hernia recurrent small-bowel obstruction with colocutaneous fistula patient admitted with rule out diagnosis of small-bowel obstruction with possible we of hernia patient was kept NPO started on IV fluids patient will be observed in the hospital and will be given IV antibiotics and wound care and will follow closely        Hospital Course:  Admitted with diagnosis of abdominal pain was a possible small bowel obstruction known to have colocutaneous and small-bowel fistula also was found to have distended abdomen patient underwent CT scan which showed suspicious small-bowel obstruction no evidence of any intra-abdominal abscess patient was kept NPO started on IV fluid was known diabetic started on insulin patient continued with the present treatment with IV antibiotics started having diarrhea patient underwent upper GI series with small-bowel follow-through patient did not show any area of small bowel obstruction patient was started on a diet was out of bed ambulating wound was healing better still draining from to fistula side patient was discharged home on p.o. antibiotics p.o. pain pill Bentyl and advised to see me in the Wound Clinic on Wednesday as.    Consults:     Significant Diagnostic Studies: Labs:    BMP:   Recent Labs   Lab 07/05/20  1514   *      K 3.2*   CL 97   CO2 26   BUN 11   CREATININE 1.1   CALCIUM 8.9   , CMP   Recent Labs   Lab 07/05/20  1514      K 3.2*   CL 97   CO2 26   *   BUN 11   CREATININE 1.1   CALCIUM 8.9   PROT 8.5*   ALBUMIN 3.3*   BILITOT 0.3   ALKPHOS 69   AST 16   ALT 10   ANIONGAP 13   ESTGFRAFRICA 59*   EGFRNONAA 51*    and CBC   Recent Labs   Lab 07/05/20  1514   WBC 9.16   HGB 9.7*   HCT 31.0*   *     Microbiology:   Blood Culture   Lab Results   Component Value Date    LABBLOO No growth after 5 days. 02/01/2020    and Wound Culture: negative  Radiology: X-Ray: CXR: X-Ray Chest 1 View (CXR): No results found for this visit on 06/29/20. and KUB: X-Ray Abdomen AP 1 View (KUB): No results found for this visit on 06/29/20.    Pending Diagnostic Studies:     None        Final Active Diagnoses:    Diagnosis Date Noted POA    PRINCIPAL PROBLEM:  SBO (small bowel obstruction) [K56.609] 01/19/2020 Yes    Colocutaneous fistula [K63.2] 12/05/2018 Yes    Diabetes mellitus [E11.9]  Yes    Drainage from wound [T14.8XXA] 08/03/2016 Yes      Problems Resolved During this Admission:      Discharged Condition: good    Disposition: Home or Self Care    Follow Up:  Follow-up Information     [facility] On 7/8/2020.    Specialty: Wound Care  Why: 8:30 am     wound care - Dr. Horton   Contact information:  [address] [address] [phone]  Additional information:  1st Floor [facility]   At this time [facility] will only use these entries [facility], [facility], and Emergency Department due to COVID-19 precautions.            Pj Sanches MD On 7/7/2020.    Specialty: Internal Medicine  Why: 10:00 am     Contact information:  [address] [facility] [address] [phone]                 Patient Instructions:      Diet diabetic     Change dressing (specify)   Order Comments: Dressing change: 1 times  "per day using gentamycin and abd pad.     Activity as tolerated     Medications:  Reconciled Home Medications:      Medication List      CHANGE how you take these medications    * HYDROcodone-acetaminophen 5-325 mg per tablet  Commonly known as: NORCO  Take 1 tablet by mouth every 4 hours.  What changed:   · when to take this  · reasons to take this     * HYDROcodone-acetaminophen 5-325 mg per tablet  Commonly known as: NORCO  Take 1 tablet by mouth every 4 (four) hours as needed.  What changed: You were already taking a medication with the same name, and this prescription was added. Make sure you understand how and when to take each.         * This list has 2 medication(s) that are the same as other medications prescribed for you. Read the directions carefully, and ask your doctor or other care provider to review them with you.            CONTINUE taking these medications    acetaminophen 325 MG tablet  Commonly known as: TYLENOL  Take 2 tablets (650 mg total) by mouth every 8 (eight) hours as needed.     amoxicillin-clavulanate 875-125mg 875-125 mg per tablet  Commonly known as: AUGMENTIN  Take 1 tablet by mouth twice a day     BD ULTRA-FINE ONEIL PEN NEEDLE 32 gauge x 5/32" Ndle  Generic drug: pen needle, diabetic  Use to inject insulin daily     betamethasone valerate 0.1% 0.1 % Crea  Commonly known as: VALISONE  Apply around wound as needed daily for itching.     carvediloL 25 MG tablet  Commonly known as: COREG  Norene tre tableta (25 mg en total) por vía oral diariamente.  (Take 1 tablet (25 mg total) by mouth once daily.)     clotrimazole-betamethasone 1-0.05% cream  Commonly known as: LOTRISONE  Apply topically locally as directed     dicyclomine 10 MG capsule  Commonly known as: BENTYL  TAKE 1 CAPSULE BY MOUTH THREE TIMES A DAY     doxycycline 100 MG tablet  Commonly known as: VIBRA-TABS  Norene tre tableta (100 mg en total) por vía oral 2 veces al día.  (Take 1 tablet (100 mg total) by mouth 2 (two) times " daily.)     EASY TOUCH TWIST LANCETS 30 gauge Misc  Generic drug: lancets  Use to test blood sugar twice daily.     hydroCHLOROthiazide 25 MG tablet  Commonly known as: HYDRODIURIL  Beresford tre tableta (25 mg en total) por vía oral diariamente.  (Take 1 tablet (25 mg total) by mouth once daily.)     LANTUS SOLOSTAR U-100 INSULIN glargine 100 units/mL (3mL) SubQ pen  Generic drug: insulin  Inject subcutaneously into the skin 10 units every morning.     metFORMIN 1000 MG tablet  Commonly known as: GLUCOPHAGE  Take 1 tablet (1,000 mg total) by mouth 2 (two) times daily.     naproxen 500 MG tablet  Commonly known as: NAPROSYN  Beresford tre tableta (500 mg en total) por vía oral 2 veces al día con las comidas.  (Take 1 tablet (500 mg total) by mouth 2 (two) times daily with meals.)     ondansetron 8 MG tablet  Commonly known as: ZOFRAN  Take 1 tablet (8 mg total) by mouth 2 (two) times daily as needed.        ASK your doctor about these medications    gentamicin 0.1 % ointment  Commonly known as: GARAMYCIN  Aplique de manera tópica en el área afectada diariamente  (Apply topically to affected area daily)  Ask about: Which instructions should I use?     TRUE METRIX GLUCOSE METER Misc  Generic drug: blood-glucose meter  Use as directed to test blood sugar twice daily     * TRUETEST TEST STRIPS Strp  Generic drug: blood sugar diagnostic     * TRUE METRIX GLUCOSE TEST STRIP Strp  Generic drug: blood sugar diagnostic  Use to test blood sugar twice daily as directed.         * This list has 2 medication(s) that are the same as other medications prescribed for you. Read the directions carefully, and ask your doctor or other care provider to review them with you.                Ulisses Horton MD  General Surgery  [facility]

## 2020-07-05 NOTE — ED PROVIDER NOTES
Encounter Date: 2020       History     Chief Complaint   Patient presents with    Abdominal Pain     RECENT HX OF sbo PT COMPLAINS OF PAIN AND NAUSEA      Patient is a 70-year-old female who complains of generalized abdominal pain with nausea and vomiting.  She denies fever or chills.  Her last bowel movement was yesterday.  Patient was just recently discharged from this facility after an admission for a small-bowel obstruction.        Review of patient's allergies indicates:   Allergen Reactions    Chlorhexidine gluconate Rash     Chlorhexidine/alcohol wipes. Rash and blistering.     Past Medical History:   Diagnosis Date    Diabetes mellitus     Diabetes mellitus, type 2     Hypertension      Past Surgical History:   Procedure Laterality Date     SECTION, CLASSIC      x2    CHOLECYSTECTOMY      COLON SURGERY      [facility]    COLONOSCOPY N/A 2018    Procedure: COLONOSCOPY;  Surgeon: Gibran Aguayo MD;  Location[facility];  Service: Endoscopy;  Laterality: N/A;    COLOSTOMY Left     CYSTOSCOPY WITH URETEROSCOPY, RETROGRADE PYELOGRAPHY, AND INSERTION OF STENT Left 2019    Procedure: CYSTOSCOPY, WITH RETROGRADE PYELOGRAM AND URETERAL STENT INSERTION with placement of a muir cath;  Surgeon: Ulisses Horton MD;  Location: [facility] OR;  Service: General;  Laterality: Left;    INCISION AND DRAINAGE OF ABSCESS N/A 2019    Procedure: INCISION AND DRAINAGE, ABSCESS;  Surgeon: Ulisses Horton MD;  Location: [facility] OR;  Service: General;  Laterality: N/A;    INCISION OF ABDOMINAL WALL N/A 8/10/2018    Procedure: INCISION, ABDOMINAL WALL;  Surgeon: Ulisses Horton MD;  Location: [facility] OR;  Service: General;  Laterality: N/A;    INCISIONAL HERNIA REPAIR Right      Family History   Problem Relation Age of Onset    Stroke Mother     No Known Problems Father     Stroke Sister      Social History     Tobacco Use    Smoking status: Never Smoker    Smokeless  tobacco: Never Used   Substance Use Topics    Alcohol use: No    Drug use: No     Review of Systems   Constitutional: Negative for chills and fever.   Gastrointestinal: Positive for abdominal pain, nausea and vomiting. Negative for constipation.   All other systems reviewed and are negative.      Physical Exam     Initial Vitals [07/05/20 1455]   BP Pulse Resp Temp SpO2   (!) 151/98 88 17 98.1 °F (36.7 °C) 97 %      MAP       --         Physical Exam    Nursing note and vitals reviewed.  Constitutional: She appears distressed.   HENT:   Head: Atraumatic.   Neck: Neck supple.   Cardiovascular: Normal rate, regular rhythm and normal heart sounds.   Pulmonary/Chest: Breath sounds normal.   Abdominal: Soft.   There is purulent drainage from to open abdominal wounds.  There is diffuse abdominal tenderness with no guarding or rebound.  Bowel sounds are normal.   Musculoskeletal: Normal range of motion. No edema.   Neurological: She is alert.   Skin: Skin is warm and dry.         ED Course   Procedures  Labs Reviewed   CBC W/ AUTO DIFFERENTIAL - Abnormal; Notable for the following components:       Result Value    Hemoglobin 9.7 (*)     Hematocrit 31.0 (*)     Mean Corpuscular Volume 73 (*)     Mean Corpuscular Hemoglobin 22.7 (*)     Mean Corpuscular Hemoglobin Conc 31.3 (*)     RDW 16.6 (*)     Platelets 423 (*)     MPV 8.1 (*)     Gran% 78.0 (*)     Lymph% 14.8 (*)     All other components within normal limits   COMPREHENSIVE METABOLIC PANEL - Abnormal; Notable for the following components:    Potassium 3.2 (*)     Glucose 147 (*)     Total Protein 8.5 (*)     Albumin 3.3 (*)     eGFR if  59 (*)     eGFR if non  51 (*)     All other components within normal limits          Imaging Results          CT Abdomen Pelvis With Contrast (Final result)  Result time 07/05/20 18:10:41    Final result by Gasper Vasquez MD (07/05/20 18:10:41)                 Impression:      Abnormal small bowel  dilatation with air-fluid levels suggestive for at least partial small-bowel obstruction with suspected transition point seen within the midline lower abdomen.  Similar findings are seen on recent CT from 06/29/2020.    Postsurgical changes and additional stable findings as detailed above.      Electronically signed by: Gasper Vasquez MD  Date:    07/05/2020  Time:    18:10             Narrative:    EXAMINATION:  CT ABDOMEN PELVIS WITH CONTRAST    CLINICAL HISTORY:  Bowel obstruction high-grade suspected;    TECHNIQUE:  Low dose axial images, sagittal and coronal reformations were obtained from the lung bases to the pubic symphysis following the IV administration of 75 mL of Omnipaque 350 .  Oral contrast was not given.    COMPARISON:  Recent CT abdomen and pelvis from 06/29/2020.    FINDINGS:  The visualized portion of the heart is unremarkable.  Small micronodule is seen within the right lower lobe.  Lung bases show no focal consolidation or pleural effusion.    No significant hepatic abnormalities are identified.  There is no intra-or extrahepatic biliary ductal dilatation.  Gallbladder has been removed.  Small hiatal hernia is noted.  Stomach is otherwise normal in appearance.  Pancreas, spleen, and adrenal glands are unremarkable.    Kidneys enhance normally with no hydronephrosis.  Small left renal hypodensities are seen, probable cysts.  Urinary bladder is nondistended.  Uterus is unremarkable.    Postsurgical bowel changes are seen.  Abnormal dilatation of small bowel loops with air-fluid levels seen consistent with small-bowel obstruction.  Suspected transition point seen within the midline lower abdomen with clumping of bowel loops seen in the region.  Prior ostomy site seen involving the left mid abdomen anterior abdominal wall.  There is colonic diverticulosis.  Postsurgical stranding versus inflammatory changes seen within the left lower quadrant, unchanged from previous exam.  Skin thickening is seen  in the periumbilical region at prior postsurgical site.  Questionable tethering or adherence of bowel loops seen in this region.  Unable to exclude potential fistula.  No free air or free fluid.    Aorta tapers normally with moderate atherosclerosis.    No acute osseous abnormality identified.                               X-Ray Abdomen AP 1 View (KUB) (Final result)  Result time 07/05/20 16:09:20    Final result by Raulito Lanza MD (07/05/20 16:09:20)                 Impression:      Postop changes.  No acute radiographic abnormality.      Electronically signed by: Raulito Lanza  Date:    07/05/2020  Time:    16:09             Narrative:    EXAMINATION:  XR ABDOMEN AP 1 VIEW    CLINICAL HISTORY:  Unspecified abdominal pain    TECHNIQUE:  AP View(s) of the abdomen was performed.    COMPARISON:  07/02/2020    FINDINGS:  Nonspecific nonobstructing pattern of bowel gas.    Postoperative changes with bowel sutures on the right.    Surgical clips in the mid abdomen and right upper quadrant.    No radiographic mass, organomegaly or pathologic calcification.  No acute osseous abnormality.                                 Medical Decision Making:   History:   Old Medical Records: I decided to obtain old medical records.  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  Patient was just discharged from this facility 2 days ago.  She denies fever.  Patient was given pain medicines and antiemetics here in the ED. Lab work is essentially unremarkable other than showing a mildly low potassium level of 3.2.  CT shows chronic changes seen on prior imaging.  Findings discussed with Dr. Horton, who has just started the patient on Reglan when she left the hospital, although I believe she has not started this yet.  Most of her symptoms are probably due to diabetic  gastroparesis and not a small-bowel obstruction.  Patient reports feeling better after ED treatment requesting to go home.  I have urged her to feel a Reglan prescription and start taking.  She will follow up with Dr. Horton but may also return to the ED for any worsening.                                   Clinical Impression:       ICD-10-CM ICD-9-CM   1. Fistula of intestine  K63.2 569.81   2. Abdominal pain  R10.9 789.00   3. Diabetic gastroparesis  E11.43 250.60    K31.84 536.3                                Robbie Cruz MD  07/05/20 2000

## 2020-07-05 NOTE — ED NOTES
Presents to ED with abd pain and nausea. Reports abdpain and nausea x 2 weeks with recent discharge from hospital on 7/3 for SBO. Reports normal bowel movement on 7/4. Lower quadrant transverse dressing removed. LLQ wound has purulent dranageand midline wound with serosanguinous drainage noted. Pt reports poor appetite for 2 days.

## 2020-07-06 ENCOUNTER — PATIENT OUTREACH (OUTPATIENT)
Dept: ADMINISTRATIVE | Facility: CLINIC | Age: 70
End: 2020-07-06

## 2020-07-06 NOTE — PATIENT INSTRUCTIONS
Small Bowel Obstruction     Small bowel obstruction can lead to tissue damage and even tissue death.   A small bowel obstruction occurs when part or all of the small intestine (bowel) is blocked. As a result, digestive contents cant move through the bowel properly and out of the body. Treatment is needed right away to remove the blockage. This can ease painful symptoms. It can also prevent serious problems, such as tissue death or bursting (rupture) of the small bowel. Without treatment, a small bowel obstruction can be fatal.  Causes of small bowel obstruction  A small bowel obstruction can be caused by:  · Scar tissue (adhesions). These may form after belly (abdominal) surgery or an infection.  · Hernia. A hernia is when an organ pushes through a weak spot or tear in the abdomen wall. Part of the small bowel can push out and be seen as a bulge under the belly. Hernias can also occur internally.  · Certain health problems. These include when part of the bowel slides inside another part (intussusception). Other causes include irritable bowel disease such as Crohns disease, and inflammation and sores in the intestine (ulcerative colitis).  · Abnormal tissue growths (tumors). These can form on the inside or outside of the small bowel. They are usually due to cancer.  Symptoms of small bowel obstruction  Common symptoms include:  · Belly cramping and pain  · Belly swelling and bloating  · Upset stomach (nausea) and vomiting  · Can't  pass gas  · Can't pass stool (constipation)  · Diarrhea  Diagnosing small bowel obstruction  Your provider will ask about your symptoms and health history. Youll also have a physical exam. Tests may also be done to confirm the problem. These can include:  · Imaging tests. These provide pictures of the small bowel. Common tests include X-rays and a CT scan.  · Blood tests. These check for infection and other problems, such as excess fluid loss (dehydration).  · Upper GI  (gastrointestinal) series with a small bowel follow-through. This test takes X-rays of the upper digestive tract from the mouth through the small bowel. An X-ray dye (contrast fluid) is used. The dye coats the inside of your upper digestive tract so it will show up clearly on X-rays.  Treating small bowel obstruction  Treatment takes place in a hospital. As part of your care, the following may be done:  · No food or drink is given by mouth. This allows your bowels to rest.  · An IV (intravenous) line is placed in a vein in your arm or hand. The IV line is used to give fluids. It may also be used to give medicines. These may be needed to ease pain, nausea, and other symptoms. They may also be needed to treat or prevent infections.  · A soft, thin, flexible tube (nasogastric tube) is inserted through your nose and into your stomach. The tube is used to remove extra gas and fluid in your stomach and bowels. This helps to ease symptoms such as pain and swelling.  · In severe cases, surgery is done. This may be needed if the small bowel is almost or totally blocked, or there is a hole in the bowel (bowel perforation). During surgery, the blockage is removed. Parts of the bowel may also be removed if there is tissue death. Other repair may be done as well, depending on what caused the blockage. Your healthcare provider will give you more information about surgery, if needed.  · Youll be watched closely in the hospital until your symptoms improve. Your provider will tell you when you can go home.  Long-term concerns   After treatment, most people recover with no lasting effects. If a long part of the bowel is removed, there is a greater chance for lifelong digestive problems. Bowel movements may become irregular. Work with your provider to learn the best ways to manage any symptoms you may have, and to protect your health.  When to call your healthcare provider  Call your provider right away if you have any of the  following:  · Severe pain (Call 911)  · Belly swelling or cramping that wont go away  · Cant pass stool or gas  · Nausea or vomiting (especially if the vomit looks or smells like stool)   Date Last Reviewed: 7/1/2016  © 3178-3987 [employer]. [address] [place] [address], [place] [address]. All rights reserved. This information is not intended as a substitute for professional medical care. Always follow your healthcare professional's instructions.

## 2020-07-06 NOTE — ED NOTES
Pt reports relief after receiving pain and nausea medication. Reports feeling well enough to go home.

## 2020-07-06 NOTE — TELEPHONE ENCOUNTER
C3 nurse attempted to contact patient. No answer. The following message was left for the patient to return the call:  Good morning  I am a nurse calling on behalf of [facility] from the [facility].  This is a Transitional Care Call for Azucena Morrison. When you have a moment please contact us at [phone] or [phone] Monday through Friday, between the hours of 8 am to 4 pm. We look forward to speaking with you. On behalf of [facility] have a nice day per .    The patient has a scheduled [facility] appointment with Pj Sanches MD on 7/7/2020 at 10:00 am

## 2020-07-08 ENCOUNTER — HOSPITAL ENCOUNTER (OUTPATIENT)
Facility: HOSPITAL | Age: 70
Discharge: HOME HEALTH | End: 2020-07-09
Attending: EMERGENCY MEDICINE | Admitting: INTERNAL MEDICINE
Payer: MEDICARE

## 2020-07-08 ENCOUNTER — HOSPITAL ENCOUNTER (OUTPATIENT)
Dept: WOUND CARE | Facility: HOSPITAL | Age: 70
Discharge: HOME | End: 2020-07-08
Attending: SURGERY
Payer: MEDICARE

## 2020-07-08 VITALS
BODY MASS INDEX: 25.83 KG/M2 | WEIGHT: 155 LBS | HEART RATE: 87 BPM | SYSTOLIC BLOOD PRESSURE: 145 MMHG | DIASTOLIC BLOOD PRESSURE: 76 MMHG | HEIGHT: 65 IN | TEMPERATURE: 99 F

## 2020-07-08 DIAGNOSIS — E87.6 HYPOKALEMIA: Primary | ICD-10-CM

## 2020-07-08 DIAGNOSIS — K56.609 SBO (SMALL BOWEL OBSTRUCTION): ICD-10-CM

## 2020-07-08 DIAGNOSIS — Z79.4 TYPE 2 DIABETES MELLITUS WITHOUT COMPLICATION, WITH LONG-TERM CURRENT USE OF INSULIN: Chronic | ICD-10-CM

## 2020-07-08 DIAGNOSIS — E11.622 TYPE 2 DIABETES MELLITUS WITH OTHER SKIN ULCER, WITH LONG-TERM CURRENT USE OF INSULIN: ICD-10-CM

## 2020-07-08 DIAGNOSIS — E11.620 TYPE 2 DIABETES MELLITUS WITH DIABETIC DERMATITIS, WITHOUT LONG-TERM CURRENT USE OF INSULIN: ICD-10-CM

## 2020-07-08 DIAGNOSIS — L02.211 ABSCESS OF ABDOMINAL WALL: ICD-10-CM

## 2020-07-08 DIAGNOSIS — Z16.12 INFECTION DUE TO ESBL-PRODUCING KLEBSIELLA PNEUMONIAE: ICD-10-CM

## 2020-07-08 DIAGNOSIS — K63.2 COLOCUTANEOUS FISTULA: ICD-10-CM

## 2020-07-08 DIAGNOSIS — E87.6 HYPOKALEMIA DUE TO EXCESSIVE GASTROINTESTINAL LOSS OF POTASSIUM: ICD-10-CM

## 2020-07-08 DIAGNOSIS — L08.9 LOCAL INFECTION OF SKIN AND SUBCUTANEOUS TISSUE: Primary | ICD-10-CM

## 2020-07-08 DIAGNOSIS — Z79.4 TYPE 2 DIABETES MELLITUS WITH OTHER SKIN ULCER, WITH LONG-TERM CURRENT USE OF INSULIN: ICD-10-CM

## 2020-07-08 DIAGNOSIS — E11.9 TYPE 2 DIABETES MELLITUS WITHOUT COMPLICATION, WITH LONG-TERM CURRENT USE OF INSULIN: Chronic | ICD-10-CM

## 2020-07-08 DIAGNOSIS — A49.8 INFECTION DUE TO ESBL-PRODUCING KLEBSIELLA PNEUMONIAE: ICD-10-CM

## 2020-07-08 PROBLEM — E87.1 HYPONATREMIA: Status: ACTIVE | Noted: 2020-07-08

## 2020-07-08 LAB
ALBUMIN SERPL BCP-MCNC: 3.6 G/DL (ref 3.5–5.2)
ALP SERPL-CCNC: 83 U/L (ref 55–135)
ALT SERPL W/O P-5'-P-CCNC: 14 U/L (ref 10–44)
ANION GAP SERPL CALC-SCNC: 12 MMOL/L (ref 8–16)
AST SERPL-CCNC: 16 U/L (ref 10–40)
BASOPHILS # BLD AUTO: 0.03 K/UL (ref 0–0.2)
BASOPHILS NFR BLD: 0.3 % (ref 0–1.9)
BILIRUB SERPL-MCNC: 0.3 MG/DL (ref 0.1–1)
BUN SERPL-MCNC: 17 MG/DL (ref 8–23)
CALCIUM SERPL-MCNC: 9.5 MG/DL (ref 8.7–10.5)
CHLORIDE SERPL-SCNC: 88 MMOL/L (ref 95–110)
CO2 SERPL-SCNC: 31 MMOL/L (ref 23–29)
CREAT SERPL-MCNC: 1.2 MG/DL (ref 0.5–1.4)
DIFFERENTIAL METHOD: ABNORMAL
EOSINOPHIL # BLD AUTO: 0.1 K/UL (ref 0–0.5)
EOSINOPHIL NFR BLD: 0.6 % (ref 0–8)
ERYTHROCYTE [DISTWIDTH] IN BLOOD BY AUTOMATED COUNT: 16.1 % (ref 11.5–14.5)
EST. GFR  (AFRICAN AMERICAN): 53.3 ML/MIN/1.73 M^2
EST. GFR  (NON AFRICAN AMERICAN): 46.2 ML/MIN/1.73 M^2
GLUCOSE SERPL-MCNC: 141 MG/DL (ref 70–110)
HCT VFR BLD AUTO: 35.9 % (ref 37–48.5)
HGB BLD-MCNC: 11 G/DL (ref 12–16)
IMM GRANULOCYTES # BLD AUTO: 0.03 K/UL (ref 0–0.04)
IMM GRANULOCYTES NFR BLD AUTO: 0.3 % (ref 0–0.5)
LACTATE SERPL-SCNC: 0.9 MMOL/L (ref 0.5–2.2)
LIPASE SERPL-CCNC: 36 U/L (ref 4–60)
LYMPHOCYTES # BLD AUTO: 1.7 K/UL (ref 1–4.8)
LYMPHOCYTES NFR BLD: 18.7 % (ref 18–48)
MCH RBC QN AUTO: 22.8 PG (ref 27–31)
MCHC RBC AUTO-ENTMCNC: 30.6 G/DL (ref 32–36)
MCV RBC AUTO: 75 FL (ref 82–98)
MONOCYTES # BLD AUTO: 0.8 K/UL (ref 0.3–1)
MONOCYTES NFR BLD: 8.6 % (ref 4–15)
NEUTROPHILS # BLD AUTO: 6.5 K/UL (ref 1.8–7.7)
NEUTROPHILS NFR BLD: 71.5 % (ref 38–73)
NRBC BLD-RTO: 0 /100 WBC
PLATELET # BLD AUTO: 503 K/UL (ref 150–350)
PMV BLD AUTO: 8.1 FL (ref 9.2–12.9)
POTASSIUM SERPL-SCNC: 2.8 MMOL/L (ref 3.5–5.1)
PROT SERPL-MCNC: 9.2 G/DL (ref 6–8.4)
RBC # BLD AUTO: 4.82 M/UL (ref 4–5.4)
SODIUM SERPL-SCNC: 131 MMOL/L (ref 136–145)
WBC # BLD AUTO: 9.03 K/UL (ref 3.9–12.7)

## 2020-07-08 PROCEDURE — 96376 TX/PRO/DX INJ SAME DRUG ADON: CPT

## 2020-07-08 PROCEDURE — 99285 EMERGENCY DEPT VISIT HI MDM: CPT | Mod: 25,27

## 2020-07-08 PROCEDURE — 99220 PR INITIAL OBSERVATION CARE,LEVL III: CPT | Mod: ,,, | Performed by: PHYSICIAN ASSISTANT

## 2020-07-08 PROCEDURE — 99285 EMERGENCY DEPT VISIT HI MDM: CPT | Mod: ,,, | Performed by: EMERGENCY MEDICINE

## 2020-07-08 PROCEDURE — 96374 THER/PROPH/DIAG INJ IV PUSH: CPT

## 2020-07-08 PROCEDURE — 99285 PR EMERGENCY DEPT VISIT,LEVEL V: ICD-10-PCS | Mod: ,,, | Performed by: EMERGENCY MEDICINE

## 2020-07-08 PROCEDURE — 83605 ASSAY OF LACTIC ACID: CPT

## 2020-07-08 PROCEDURE — 25000003 PHARM REV CODE 250: Performed by: EMERGENCY MEDICINE

## 2020-07-08 PROCEDURE — 80053 COMPREHEN METABOLIC PANEL: CPT

## 2020-07-08 PROCEDURE — 85025 COMPLETE CBC W/AUTO DIFF WBC: CPT

## 2020-07-08 PROCEDURE — 83690 ASSAY OF LIPASE: CPT

## 2020-07-08 PROCEDURE — 99213 OFFICE O/P EST LOW 20 MIN: CPT

## 2020-07-08 PROCEDURE — 83036 HEMOGLOBIN GLYCOSYLATED A1C: CPT

## 2020-07-08 PROCEDURE — G0378 HOSPITAL OBSERVATION PER HR: HCPCS | Mod: CS

## 2020-07-08 PROCEDURE — 63600175 PHARM REV CODE 636 W HCPCS: Performed by: EMERGENCY MEDICINE

## 2020-07-08 PROCEDURE — 99220 PR INITIAL OBSERVATION CARE,LEVL III: ICD-10-PCS | Mod: ,,, | Performed by: PHYSICIAN ASSISTANT

## 2020-07-08 PROCEDURE — 83735 ASSAY OF MAGNESIUM: CPT

## 2020-07-08 RX ORDER — POTASSIUM CHLORIDE 20 MEQ/1
40 TABLET, EXTENDED RELEASE ORAL
Status: COMPLETED | OUTPATIENT
Start: 2020-07-08 | End: 2020-07-08

## 2020-07-08 RX ORDER — POTASSIUM CHLORIDE 7.45 MG/ML
10 INJECTION INTRAVENOUS
Status: COMPLETED | OUTPATIENT
Start: 2020-07-08 | End: 2020-07-09

## 2020-07-08 RX ORDER — KETOROLAC TROMETHAMINE 30 MG/ML
10 INJECTION, SOLUTION INTRAMUSCULAR; INTRAVENOUS
Status: COMPLETED | OUTPATIENT
Start: 2020-07-08 | End: 2020-07-08

## 2020-07-08 RX ORDER — METOCLOPRAMIDE 10 MG/1
10 TABLET ORAL 4 TIMES DAILY
Qty: 120 TABLET | Refills: 1 | Status: ON HOLD | OUTPATIENT
Start: 2020-07-08 | End: 2020-09-16 | Stop reason: HOSPADM

## 2020-07-08 RX ADMIN — KETOROLAC TROMETHAMINE 10 MG: 30 INJECTION, SOLUTION INTRAMUSCULAR at 09:07

## 2020-07-08 RX ADMIN — POTASSIUM CHLORIDE 40 MEQ: 1500 TABLET, EXTENDED RELEASE ORAL at 10:07

## 2020-07-08 RX ADMIN — POTASSIUM CHLORIDE 10 MEQ: 7.46 INJECTION, SOLUTION INTRAVENOUS at 10:07

## 2020-07-08 NOTE — PROGRESS NOTES
"[facility]                Progress Note    Subjective:       Patient ID: Azucena Morrison is a 70 y.o. female.    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 Pt denies any fevers or chills but states she feels nauseous at times. Returned to [place] June 28, from [place], reports much trouble with abdominal wound while in [place].  7/6/17-- Patient scheduled for surgical drainage of wound Tuesday 7/11/17DB  7/19/17-- Patient post op clinic visit.  8/16/17: CT of abdomen and pelvis done 8/9/17 due to suspected fistula  8/23/17-- U/S of abdomen done today.  12/13/17 Wound vac with 20cc drainage in clinic today.  1/10/18: on PO abx  1/24/18: Fistulagram ordered per Dr. Horton. Patient still on PO abx  02/21/18 Patient is not on antibiotics at this time. BS fasting 135 per patient report this am.  03/07/18 Culture of Anterior Abdominal Wound is positive. No antibiotics at this time.  fasting per patient report.    03/21/18 Patient c/o nausea along with abdominal tenderness near abd midline wound. Patient states that pain level is 6 and that she passed two sero-sanguineous clots from wound. Patient is afebrile this am.  3/28/18: patient continue antibiotics and reports that pain is less than last weeks clinic visit. Drainage has decreased as well  04/04/18 Patient states that abdominal pain is "pressure" sensation and that when she pushes down on her abdomen on either side of abdominal wound she feels like she has to urinate. Patient reports pain level of 3 this am.  fasting this am per patient report.   5/2/18: Patient had Abdomina Toribio today before " wound care. She had discontinued Bactrim PO per Dr. Horton's recommendation and culture results and is now taking Amoxicillin PO  6/20/18: Pt reports itching to wound and periwound   6/27/18: patient reports no new complaints with regards to her wound or abdomen, wound continues to decrease in size and drainage  8/1/18: Pt reports increased pain to abdomen with nausea and emesis since thursday 7/26/18, denies any fevers, patient did no go to ER as instructed by home health nurse on Sat. 7/28/18.  8/29/18: Patient admitted to hospital 8/2 for abdominal wound complications. Surgery for abdominal abscess per Dr. Horton on 8/3. Patient placed on IV antibiotics and discharged home on 8/18 with [facility] and PICC line to [facility]. Patient currently on IV ertapenem. IV medication sent to patients home per [facility].   9/5/18: IV Ertapenem to continue 1 week. Culture sent to lab. [facility] sent orders. PICC line intact to [facility].  9/12/18 Antibiotics completed. Culture results pending. 1 deep stitch remaining.  9/12/18: Culture positive for E. Coli, patient to continue Ertapenem IV antibiotics x 2weeks. [facility] notified  9/26/18: F/U abdominal wound, wound continues to improve. Patient will complete IV antibiotics today  10/3/18: patient c/o diarrhea for 2 days and nausea with some vomiting. Labs and stool culture ordered. IV antibiotics discontinued today  10/10/2018 patient will start on IV antibiotic Invaz IV once a day, pending PICC line placement, order placed today  for PICC per Dr. Horton.  10/24/18: patient on IV antibiotics, tolerating well. Continue for 1 week  11/7/2018: IV Antibiotic discontinued.  11/8/2018: PICC line to left upper arm discontinued by [facility].  11/21/2018 F/u for abdominal wall fistula , c/o nausea  No vomiting , no fever  12/5/2018: F/u for abdominal wall fisula, c/o gas, Dr. Horton will order Bentyl. Surgery schedule for January 2019.  12/12/2018: F/u for abdominal wall  fistula, c/o abdominal pain. Dr. Horton ordered Norco 5/325mg tab 1 PO every 4 hours as needed for pain and referred patient for x-ray of abdomen after clinic today.  12/19/2018: F/u for abdominal wall fisula, c/o nausea, Dr. Horton re ordered Ondansetron (Zofran) 8mg one tab by mouth every eight hours as needed for nausea. No changes in wound condition from last visit noted in clinic today.  12/26/2018: F/u abdominal wall fistula drainage, no c/o at this time. Denies any abdominal pain or nausea. Dr. Horton recommends surgery first week of January 2019 for abdominal fistula treatment and possible colostomy placement, patient agree.  01/02/2019: F/u abdominal wall fistula. Dressing with large amount of drainage, malodorous, Dr. Horton is scheduling surgery for third week of January, 2019.  1/16/19: F/U Dr. Horton today in clinic, surgery scheduled for next Friday 1/25/19 with Dr. Horton.     01/23/2019: Presents to wound care clinic for f/u abdominal wound care tx. Surgery scheduled with Dr. Horton for Friday 01/25/2019. Dr. Horton explained Mrs. Morrison surgery procedure including possible complications, Nurse  ([place]) present, patient verbalizes understanding of procedure including possible complications, all questions from patient were answered by Dr. Horton including blood sugar and blood pressure medications per patient's concerns. Consent for surgery and blood transfusion signed by patient, Dr. Horton and nurse witnessed.  Abdominal wound cultures collected per Dr. Horton, cultures sent to lab/micro pending results. Dr. Horton prescribed the following orders: fleet enema for Thursday 1/24/2019, clear liquid diet and not to eat after midnight all for 1/24/2019. Start taking Neomycin and erythromycin by mouth three times a day (1/24/2019) and dulcolax one tab by mouth twice a day, Mrs. Morrison voices understanding.     01/30/2019: F/U wound care treatment with Dr. Horton. Per pt,  "primary physician Dr. Pj Monae ordered for her to take potassium pills for three days, last day today and scheduled for lab work at primary physician clinic on 1/31/2019. Per pt. Feeling better, no more abdominal pain (went to ER 1/24/2019 and discharged 1/25/2019 c/o abd pain.). Dr. Horton awaiting on primary clearance to re-schedule surgery, per pt. She will call wound care clinic and Dr. Horton to make aware of clearance day. Continue with same orders for dressing change for Dr. Horton. Mrs. Morrison requesting gentamicin refill.     02/06/2019: F/u with Dr. Horton for wound care treatment. Mrs. Morrison brought to clinic a clearance for surgery by Dr. Pj Sanches dated 02/06/2019 "Hyperkalemia-Resolved K 4.8 2/1/2019, labs , Cr. 1.05, H/H 10/30. No contraindication to surgery, tight control of BS, Hydration." A copy of EKG (1/24/2019) and lab work from [facility] collected 1/31/2019, reported results 2/1/2019. Dr. Horton scheduled surgery for 02/22/2019, consent were signed on 01/23/2019, all questions were answered by Dr. Horton, this nurse  (Leb[place]) present. Education provided to patient on the importance of having a clear liquid diet the day before surgery 02/21/2019 as per Dr. Horton, new medications and preop preparation before surgery, verbalizes understanding. Dr. Horton ordered Norco 5/325 mg PO for pain, Dulcolax two tabs by mouth to take on 02/21/2019, Soap suds enema (SSE) on Thursday 02/21/2019, Golytely by mouth 2 liters on 2/21/2019, Erythromycin 500 mg one tab by mouth three times a day and Neomycin 1 gm by mouth three times a day.     2/13/2019: Presents to wound care clinic for a f/u with Dr. Horton for wound care treatment. No new orders in wound dressing change, reviewed preop orders with Mrs. Morrison per Dr. Horton, all questions answered. Surgery scheduled for 02/22/2019.  2/20/19: Continues wound care a previously ordered.  Depth measured per  " "Clifford 8cm.  Preop orders reviewed with patient who verbalized understanding.  Surgery scheduled for Fri 2/22/19.  Rx given to patient for Norco and Zofran.    03/06/19: Patient admitted to [facility] on 02/22/19 for exploratory laparotomy of abdomen per Dr. Horton. Patient discharged on 02/28/19 with home health and PICC line with IV antibiotics. Staples removed today in clinic. Patient denies any fever, chills, n&v; however, she states that she has "low energy." Continued with wound care as ordered.     3/13/2019: F/U with Dr. Horton for wound care treatment. Continue with same wound dressing change orders as per Dr. Horton, orders followed. Home Health to continue wound dressing change and lab draw for CBC weekly; continue IV Ertapenem/Invaz 1 gm IV daily as ordered.     3/20/2019: Clinic visit with Dr. Horton for abdominal abscess treatment. Presents with moderate draining from abdominal wound; wound size decreasing per measurement. Per Dr. Horton, apply one-piece system ostomy bag to abdominal wound for drain collection, may drain bag when it fills and may change every other day, continue applying gentamicin to wound bed before applying ostomy bag. Continue IV Invaz until completion. If ostomy bag not effective for draining collection, may return to previous orders for wound dressing change. Orders followed. Education provided to Mrs. Morrison on handwashing and ostomy care techniques, voices and demonstrates understanding.   3/27/19: Follow up with Dr. Horton today in clinic for abdominal abscess, moderated tanish yellow drainge present on dressing.  Wound slowly improving, patient refused one-piece ostomy bag stated " no it's too messy." requested to return to previous dressing prior to ostomy bag placement- iodoform gauze, aquacel extra, sm abd, and mefix tape.  Silver nitrate x1 per Dr. Horton today in clinic. Rx given to patient for PO Zofran.     04/03/2019: F/u clinic visit with Dr. Horton. " Abdominal wound improving in size, picture on file. Wound cultures from abdominal abscess collected and sent to lab/micro, pending results. Mrs. Morrison states going out of the country ([place]) at the end of April and plans to stay there for approximately two months. Dr. Horton ordered IV antibiotic for two more weeks and new wound care dressing, orders followed.     4/9/2019: Clinic visit with Dr. Horton.  Per Dr. Horton, wound deep measurement increased, draining present. Wound cultures from abdominal wound taken and sent to lab/micro, pending results. C/o abdominal pain and itching around wound, Rx for betamethasone cream 01.% and Norco 5/325mg give in clinic by Dr. Horton. Wound care dressing orders followed. Continue IV antibiotic as previously ordered.  04/17/19: F/u for non-healing wound to abdomen. Culture report negative. Dr. Horton ordered 1 more week of IV antibiotics. Continue with topical wound care as ordered.     4/24/2019: Clinic visit with Dr. Horton for abdominal wound treatment. Wound improvement present, pictures on file. Last IV antibiotic today 4/24/2019, [facility] to discontinue PICC line from left upper arm on 4/25/2019. Per Mrs. Morrison, going to [place] on Friday 4/26/2019 and out of the country ([place]) on Monday 4/29/2019. This nurse spoke to Shaunna at [employer] (IV antibiotic delivery company) and informed last dose of antibiotic is today. Dr. Horton ordered Hydrocodone-acetaminophen (Norco) 5-325 mg (32 tabs), Bentyl 10 mg (120 capsules) and Gentamycin ointment. Education provided on the importance of eating food that contains iron (to prevent anemia) as well as eating meat and taking her blood pressure medications (blood pressure medication) on time, voices understanding. All questions answered by Dr. Horton. [facility] outpatient confirmed Betamethasone cream ordered on 4/10/2019 is ready for . Instructions and education provided to Mrs. Morrison on  abdominal wound dressing changes, hand washing techniques and medication use, effects and side effects, voices and demonstrates understanding. Discharged home on stable conditions, [facility] will give wound care clinic a call when she is back from [place] in few months from now.     7/17/19: Readmit today to wound care clinic.  Patient was recently out of the country visiting family in [place].  Patient complains of pain and nausea as on prior visits.  Dr. Horton seen patient today discussed with patient again placing colostomy patient refused.  Culture of wound taken, Dr. Horton restarted patient on zofran po for nausea, and Norco for pain. [facility] restarted today in clinic on Mondays and Fridays and prn. Orders given to gently pack wound with aquacel ag rope, cover with aquacel extra, 4x4 gauze, small abd pad and mefix tape change dressing ever other day by [facility] and Prn per patient.     7/24/2019: Clinic visit with Dr. Horton. Continue with dressing change as ordered. Wound cultures reviewed with patient, lab work ordered by Dr. Horton, no fever present or reported at this time. C/o decrease appetite, medication periactin prescribed. Requesting needles for insulin pen, order prescribed by Dr. Horton.      7/31/2019: F/U clinic visit with Dr. Horton. C/o of excruciated back pain 10/10, not feeling well, lot of gas and left upper quadrant discomfort. New orders prescribed: Augmentin 875-125 mg by mouth twice a day. Bentyl 10 mg one tab by mouth 4 times a day. Tramadol 50 mg one tab by mouth every 6 hours as needed for pain. Abdomen ultrasound. Wound care dressing completed as ordered.     8/7/2019: Clinic visit with Dr. Horton, denies any pain at this time. Wound care dressing done as ordered, no changes in wound size from last visit noted. Continue taking oral antiiotic as ordered, pending abdominal ultrasound, scheduled for 8/13/2019.     8/14/2019: F/U clinic visit with Dr. Horton.  Admitted and discharged from emergency department this morning with abdominal pain, CT and ultrasound in filed. Dr. Horton performed a small drainage from the abdominal wound, moderate amount of creamy, serosanguineous fluid from abdominal wound present. Iodoform gauze packed into her wound, dressing changed as ordered. Wound cultures collected and sent to lab/micro, pending results. Rx for Norco 5/325 mg one tab PO every 4 hours PRN as needed # 24 given to pt by Dr. Horton.      8/21/2019: Clinic visit with Dr. Hroton. Wound dressing changed as ordered.      11/6/19: Follow up appt with Dr Horton. Wounds improving slowly. Continues to take antibiotics (Augmentin 875-125mg). No complaints voiced. Follow up in 1 week.  11/13/19:  F/U clinic visit with Dr. Horton.  Wound depth per Dr. Horton is 8.5 cm.  Continue PT Augmentin.  Patient is co left sided abdominal pain.  Abdominal US and labs ordered.  Wound care tolerated well.  Fu with Dr. Horton in 1 week.      11/20/19:Dr Horton assessed patient. Silver nitrate applied to wound. Continuing present plan of care. Patient is scheduled to have abdominal ultra sound Friday 11/22/19. Continues to take Augmentin.  F/U in 1 week.    11/27/19: Follow up in clinic with Dr. Horton. Wound depth 8.5cm, silver nitrate x 3 per Dr. Horton, patient continues taking po Augmentin, c/o left sided abdominal pain, ultrasound results discussed with patient, Dr. Horton will watch for now.  Follow up 1 week 12/4/19.      12/04/19: F/u Dr. Horton. Continue with current wound care treatment. Rx given for Bentyl 10 mg. Patient to follow up in  1 week at wound clinic  12/11/19: F/u with . Continue with current wound care treatment. Rx given for Lotrisone cream to be applied daily per patient to right lower leg rash. Patient to follow up in  1 week at wound clinic     12/18/19: F/u with Dr. Horton. Patient c/o pain 9 out of 10  to abdomen LLQ. Area of pain is warm to touch,  pink, and a small nodule can be felt. Patient states that symptoms began about 4 days ago. Stat CT scan ordered. Patient will have CT done tomorrow due to not fasting this morning. Instructed patient to fast before CT scan. Continue with current wound care treatment      01/08/2020: F/u with Dr. Horton. Patient had I&D on 12/22/19 for abscess to abdomen. New wound care orders given. Patient continues on po abx and home health for wound care. Follow up in 1 week at wound care clinic     01/15/20: F/u with Dr. Horton. Patient c/o mild discomfort at wound to left abdomen. Patient denies any fever, chills, n&v, diarrhea, and/or constipation. Will continue to monitor. Continue with current wound care treatment and follow up in  Wound clinic in 1 week.  1/29/2020: Fu today in wound care clinic with .  Wounds improving slowly.  Silver nitrate x 1 to each wound per Dr. Horton patient tolerated well.  RX give to patient today for Amoxicillin po, Bentyl Po and Norco 5/325mg Po today in clinic.  Fu 1 week 2/5/2020 with Dr. Horton.  2/5/20: F/U with Dr. Horton. LUQ site resolved. Midline site same. Cont. POC.  2/12/20: F/U with Dr. Horton. LUQ site and midline probed by Dr. Horton. Cont. Augmentin. Rx for Norco provided. Cont. POC.  2/19/20: F/U with Dr. Horton. Afebrile. No s/s of infection. Culture done of midline today. Rx for Augmentin and Zofran sent electronically to pharmacy. Return in 1 week.  2/26/2020: FU MD visit. Cultures reviewed per MD, po abx changed from augmentin to tetracycline sent to pharmacy, patient verbalized understanding. Patient states she will be traveling to [place] on March 29 for 3 months.      03/04/2020: F/u with Dr. Horton. Patient c/o pain at wound site located on left abdomen, lethargy, and no appetite for the past 3 days . Culture taken and patient instructed to stop po tetracycline while awaiting culture results. New wound care orders given for left abdominal wound.  Orders routed to home health. Patient to follow up in 1 week at wound clinic.  3/11/20: F/U with Dr. Horton. No c/o for pain today. Stop tetracycline. Rx for Augmentin sent to pharmacy. Cont. POC. Return in 1 week.  orders routed.  3/18/20: F/U with Dr. Horton. No c/o of pain at this time. Rx for Bentyl and Norco provided. Continue Augmentin. Return in 1 week.  orders routed.     04/01/2020: F/u with Dr. Horton. Wound showing improvement. Patient states that she has not had home health nurse come out in 2 weeks. Patient also states that she would like us to order supplies so she can begin doing her own wound care. Supplies ordered. Patient instructed on daily wound care treatment and verbalized understanding. Continue po Augmentin BID  04/08/2020: F/u with Dr. Horton. Wound improving. Continue with current wound care treatment. Patient given rx for norco 5/325 mg and zofran 8 mg. No new complaints per patient today   04/22/20  F/U with Dr. Horton.  Wound unchanged.  Pack wounds with plain nu gauze with gentamicin ointment.  Patient given RX for Norco 5/325 mg, Zofran 8 mg, Amoxiciilin .  Patient to follow  Up with Dr. Horton in 1 week.  Wound care supplies ordered.  Review of Systems   04/29/2020: F/u with Dr. Horton. Wound probed with cotton-tip applicator per Dr. Horton to assist with fluid drainage. Patient tolerated well. Continue with current wound care treatment and follow up in 1 week at wound clinic  05/06/2020: F/u with Dr. Horton. Wound remains stable. Patient experiencing increased pain. CT scan of abdomen ordered by PCP. Patient got CT scan on Monday, and Dr. Horton reviewed results with patient. Continue current wound care treatment and follow up in 1 week  05/13/2020- pt follow-up with Dr. Horton. Superficial skin discoloration noted to rle, addressed by Dr. Horton. Rx for bentyl and lotrisone sent with patient. Patient to continue daily dressing changes. Patient to follow-up with   Clifford 05/20/2020.  5/21/20:  Fu with Dr. Horton.  Purulent drainage with odor coming from wounds.  Cx of mid abdominal wound obtained.  Rx given to patient for Amoxicillin 875/125mg and Norco 5/325mg.  Care tolerated well.  Fu with Dr. Horton in 1 week.  5/28/20:Dr Horton assessed patient. Continue with same plan of care. Rx for Doxycycline sent lab. Patient instructed on Rx and voiced understanding.         06/03/2020: F/u with Dr. Horton. Continue po doxycycline and current topical wound care treatment daily. Follow up in 1 week at wound care clinic  06/10/2020: Wounds remain stable. Patient c/o mild pain to left side of abdomen. Continue with topical wound care treatment. Rx sent to patient's pharmacy for norco 5-325 mg and lidocaine jelly 2%  06/17/2020: F/u with Dr. Horton. Wounds remain stable. Continue with topical wound care treatment and follow up next week.  06/24/2020: F/u with Dr. Horton. Wounds remain stable. Patient going for 2nd opinion next week r/t hernia. Continue with current topical wound care treatment and follow up in 1 week at wound clinic.   07/08/20:  F/U with Dr. Horton.  Increased pain and drainage.  Patient with nausea and vomiting.  Dr. Horton advised patient to follow up with Gastoenteroligist specialist.   Advised patient to report to Emergency department as soon as possible.  Patient and caregiver voiced understanding.    Review of Systems   Constitutional: Negative.    HENT: Negative.    Eyes: Negative.    Respiratory: Negative.    Cardiovascular: Negative.    Gastrointestinal: Negative.    Genitourinary: Negative.    Musculoskeletal: Negative.    Skin: Negative.    Neurological: Negative.    Psychiatric/Behavioral: Negative.          Objective:        Physical Exam  Constitutional:       Appearance: She is well-developed.   HENT:      Head: Normocephalic.   Eyes:      Conjunctiva/sclera: Conjunctivae normal.      Pupils: Pupils are equal, round, and reactive to light.    Neck:      Musculoskeletal: Normal range of motion and neck supple.   Cardiovascular:      Rate and Rhythm: Normal rate and regular rhythm.      Heart sounds: Normal heart sounds.   Pulmonary:      Effort: Pulmonary effort is normal.      Breath sounds: Normal breath sounds.   Abdominal:      General: Bowel sounds are normal.      Palpations: Abdomen is soft.   Musculoskeletal: Normal range of motion.   Skin:     General: Skin is warm and dry.   Neurological:      Mental Status: She is alert and oriented to person, place, and time.      Deep Tendon Reflexes: Reflexes are normal and symmetric.         Vitals:    07/08/20 0905   BP: (!) 145/76   Pulse: 87   Temp: 98.5 °F (36.9 °C)       Assessment:         No diagnosis found.         Wound 02/01/20 1353 Ulceration midline Abdomen #1 (Active)   02/01/20 1353    Pre-existing: Yes   Primary Wound Type: Ulceration   Side:    Orientation: midline   Location: Abdomen   Wound Number (optional): #1   Ankle-Brachial Index:    Pulses:    Removal Indication and Assessment:    Wound Outcome:    (Retired) Wound Type:    (Retired) Wound Length (cm):    (Retired) Wound Width (cm):    (Retired) Depth (cm):    Wound Description (Comments):    Removal Indications:    Wound Image   07/08/20 1028   Dressing Appearance Moist drainage 07/08/20 1028   Drainage Amount Copious 07/08/20 1028   Drainage Characteristics/Odor Serosanguineous;Purulent;Creamy 07/08/20 1028   Appearance Pink;Yellow 07/08/20 1028   Tissue loss description Full thickness 07/08/20 1028   Red (%), Wound Tissue Color 80 % 07/08/20 1028   Yellow (%), Wound Tissue Color 20 % 07/08/20 1028   Periwound Area Intact 07/08/20 1028   Wound Length (cm) 0.5 cm 07/08/20 1028   Wound Width (cm) 0.4 cm 07/08/20 1028   Wound Depth (cm) 4.2 cm 07/08/20 1028   Wound Volume (cm^3) 0.84 cm^3 07/08/20 1028   Wound Surface Area (cm^2) 0.2 cm^2 07/08/20 1028   Care Sterile normal saline 07/08/20 1028   Dressing Abd pad;Calcium  alginate;Silver 07/08/20 1028   Periwound Care Skin barrier film applied;Dry periwound area maintained 07/08/20 1028            Wound 03/04/20 1023 Abdomen #2 (Active)   03/04/20 1023    Pre-existing:    Primary Wound Type:    Side: Left   Orientation:    Location: Abdomen   Wound Number (optional): #2   Ankle-Brachial Index:    Pulses:    Removal Indication and Assessment:    Wound Outcome:    (Retired) Wound Type:    (Retired) Wound Length (cm):    (Retired) Wound Width (cm):    (Retired) Depth (cm):    Wound Description (Comments):    Removal Indications:    Wound Image   07/08/20 1028   Dressing Appearance Moist drainage 07/08/20 1028   Drainage Amount Copious 07/08/20 1028   Drainage Characteristics/Odor Serosanguineous;Purulent;Creamy;Malodorous 07/08/20 1028   Appearance Red;Yellow 07/08/20 1028   Tissue loss description Full thickness 07/08/20 1028   Red (%), Wound Tissue Color 90 % 07/08/20 1028   Yellow (%), Wound Tissue Color 10 % 07/08/20 1028   Periwound Area Moist;[place] 07/08/20 1028   Wound Edges Open 07/08/20 1028   Wound Length (cm) 0.2 cm 07/08/20 1028   Wound Width (cm) 0.3 cm 07/08/20 1028   Wound Depth (cm) 7 cm 07/08/20 1028   Wound Volume (cm^3) 0.42 cm^3 07/08/20 1028   Wound Surface Area (cm^2) 0.06 cm^2 07/08/20 1028   Tunneling (depth (cm)/location) multiple unneling noted measuring 7 cm and longer 07/08/20 1028   Care Cleansed with:;Sterile normal saline 07/08/20 1028   Dressing Gauze;Abd pad 07/08/20 1028   Periwound Care Skin barrier film applied;Moisture barrier applied 07/08/20 1028   Dressing Change Due 07/15/20 07/08/20 1028         Abdominal wound midline   Clean wound with normal saline   Lidocaine 2% gel or 4 % Topical solution: PRN in clinic   Danielle wound: Cavilon, betamethasone PRN itching   Primary dressing: Aquacel Ag rope lightly pack into wound bed   Secondary dressing: Cover with aquacel extra, 4 x 4 gauze, small ABD or border dressing and secure with mefix tape      Left  Upper Quadrant abdomen   Cleanse with normal saline   Lidocaine 2% gel or 4 % Topical solution: PRN in clinic   Periwound: Cavilon, betamethasone PRN itching   Primary dressing: Apply Aquacel rope packing and gently pack to wound depth   Secondary dressing: Cover with aquacel extra, 4x4 gauze, small abd pad or border dressing, and secure with mefix tape   Patient advised to consult gastroenterologist and report to [place] [facility] if symptoms persist        Frequency: Daily per patient   Plan:     Patient to return to clinic in 1 week if not admitted to hospital             [unfilled]

## 2020-07-09 VITALS
WEIGHT: 155.19 LBS | DIASTOLIC BLOOD PRESSURE: 85 MMHG | RESPIRATION RATE: 17 BRPM | BODY MASS INDEX: 29.3 KG/M2 | OXYGEN SATURATION: 96 % | HEART RATE: 90 BPM | TEMPERATURE: 98 F | SYSTOLIC BLOOD PRESSURE: 143 MMHG | HEIGHT: 61 IN

## 2020-07-09 PROBLEM — K56.600 PARTIAL OBSTRUCTION OF SMALL INTESTINE: Status: ACTIVE | Noted: 2019-01-24

## 2020-07-09 PROBLEM — E87.1 DEHYDRATION WITH HYPONATREMIA: Status: RESOLVED | Noted: 2020-07-08 | Resolved: 2020-07-09

## 2020-07-09 PROBLEM — Z16.12 INFECTION DUE TO ESBL-PRODUCING KLEBSIELLA PNEUMONIAE: Status: ACTIVE | Noted: 2020-07-05

## 2020-07-09 PROBLEM — R10.9 INTRACTABLE ABDOMINAL PAIN: Status: RESOLVED | Noted: 2020-02-01 | Resolved: 2020-07-09

## 2020-07-09 PROBLEM — E86.0 DEHYDRATION WITH HYPONATREMIA: Status: RESOLVED | Noted: 2020-07-08 | Resolved: 2020-07-09

## 2020-07-09 PROBLEM — A49.8 INFECTION DUE TO ESBL-PRODUCING KLEBSIELLA PNEUMONIAE: Status: ACTIVE | Noted: 2020-07-05

## 2020-07-09 PROBLEM — K63.2 COLOCUTANEOUS FISTULA: Chronic | Status: ACTIVE | Noted: 2018-12-05

## 2020-07-09 PROBLEM — E86.0 DEHYDRATION WITH HYPONATREMIA: Status: ACTIVE | Noted: 2020-07-08

## 2020-07-09 LAB
ALBUMIN SERPL BCP-MCNC: 2.9 G/DL (ref 3.5–5.2)
ALP SERPL-CCNC: 69 U/L (ref 55–135)
ALT SERPL W/O P-5'-P-CCNC: 10 U/L (ref 10–44)
ANION GAP SERPL CALC-SCNC: 14 MMOL/L (ref 8–16)
AST SERPL-CCNC: 13 U/L (ref 10–40)
BACTERIA SPEC AEROBE CULT: ABNORMAL
BACTERIA SPEC AEROBE CULT: ABNORMAL
BASOPHILS # BLD AUTO: 0.02 K/UL (ref 0–0.2)
BASOPHILS NFR BLD: 0.3 % (ref 0–1.9)
BILIRUB SERPL-MCNC: 0.3 MG/DL (ref 0.1–1)
BUN SERPL-MCNC: 15 MG/DL (ref 8–23)
CALCIUM SERPL-MCNC: 8.6 MG/DL (ref 8.7–10.5)
CHLORIDE SERPL-SCNC: 94 MMOL/L (ref 95–110)
CO2 SERPL-SCNC: 26 MMOL/L (ref 23–29)
CREAT SERPL-MCNC: 1.1 MG/DL (ref 0.5–1.4)
DIFFERENTIAL METHOD: ABNORMAL
EOSINOPHIL # BLD AUTO: 0 K/UL (ref 0–0.5)
EOSINOPHIL NFR BLD: 0.5 % (ref 0–8)
ERYTHROCYTE [DISTWIDTH] IN BLOOD BY AUTOMATED COUNT: 16.1 % (ref 11.5–14.5)
EST. GFR  (AFRICAN AMERICAN): 59.2 ML/MIN/1.73 M^2
EST. GFR  (NON AFRICAN AMERICAN): 51.3 ML/MIN/1.73 M^2
ESTIMATED AVG GLUCOSE: 140 MG/DL (ref 68–131)
ESTIMATED AVG GLUCOSE: 146 MG/DL (ref 68–131)
GLUCOSE SERPL-MCNC: 120 MG/DL (ref 70–110)
HBA1C MFR BLD HPLC: 6.5 % (ref 4–5.6)
HBA1C MFR BLD HPLC: 6.7 % (ref 4–5.6)
HCT VFR BLD AUTO: 29.8 % (ref 37–48.5)
HGB BLD-MCNC: 9.6 G/DL (ref 12–16)
IMM GRANULOCYTES # BLD AUTO: 0.02 K/UL (ref 0–0.04)
IMM GRANULOCYTES NFR BLD AUTO: 0.3 % (ref 0–0.5)
LYMPHOCYTES # BLD AUTO: 1.6 K/UL (ref 1–4.8)
LYMPHOCYTES NFR BLD: 21.9 % (ref 18–48)
MAGNESIUM SERPL-MCNC: 1.4 MG/DL (ref 1.6–2.6)
MAGNESIUM SERPL-MCNC: 1.6 MG/DL (ref 1.6–2.6)
MCH RBC QN AUTO: 23.2 PG (ref 27–31)
MCHC RBC AUTO-ENTMCNC: 32.2 G/DL (ref 32–36)
MCV RBC AUTO: 72 FL (ref 82–98)
MONOCYTES # BLD AUTO: 0.7 K/UL (ref 0.3–1)
MONOCYTES NFR BLD: 9.3 % (ref 4–15)
NEUTROPHILS # BLD AUTO: 5 K/UL (ref 1.8–7.7)
NEUTROPHILS NFR BLD: 67.7 % (ref 38–73)
NRBC BLD-RTO: 0 /100 WBC
OSMOLALITY SERPL: 289 MOSM/KG (ref 275–295)
PHOSPHATE SERPL-MCNC: 2.6 MG/DL (ref 2.7–4.5)
PLATELET # BLD AUTO: 408 K/UL (ref 150–350)
PMV BLD AUTO: 8.2 FL (ref 9.2–12.9)
POCT GLUCOSE: 122 MG/DL (ref 70–110)
POCT GLUCOSE: 133 MG/DL (ref 70–110)
POTASSIUM SERPL-SCNC: 3.1 MMOL/L (ref 3.5–5.1)
PROT SERPL-MCNC: 7.4 G/DL (ref 6–8.4)
RBC # BLD AUTO: 4.14 M/UL (ref 4–5.4)
SARS-COV-2 RDRP RESP QL NAA+PROBE: NEGATIVE
SODIUM SERPL-SCNC: 134 MMOL/L (ref 136–145)
WBC # BLD AUTO: 7.32 K/UL (ref 3.9–12.7)

## 2020-07-09 PROCEDURE — C1751 CATH, INF, PER/CENT/MIDLINE: HCPCS

## 2020-07-09 PROCEDURE — 36410 VNPNXR 3YR/> PHY/QHP DX/THER: CPT

## 2020-07-09 PROCEDURE — 63600175 PHARM REV CODE 636 W HCPCS: Performed by: PHYSICIAN ASSISTANT

## 2020-07-09 PROCEDURE — 99214 PR OFFICE/OUTPT VISIT, EST, LEVL IV, 30-39 MIN: ICD-10-PCS | Mod: GC,,, | Performed by: INTERNAL MEDICINE

## 2020-07-09 PROCEDURE — G0378 HOSPITAL OBSERVATION PER HR: HCPCS

## 2020-07-09 PROCEDURE — U0002 COVID-19 LAB TEST NON-CDC: HCPCS

## 2020-07-09 PROCEDURE — 93005 ELECTROCARDIOGRAM TRACING: CPT

## 2020-07-09 PROCEDURE — 85025 COMPLETE CBC W/AUTO DIFF WBC: CPT

## 2020-07-09 PROCEDURE — 93010 ELECTROCARDIOGRAM REPORT: CPT | Mod: ,,, | Performed by: INTERNAL MEDICINE

## 2020-07-09 PROCEDURE — 96365 THER/PROPH/DIAG IV INF INIT: CPT | Mod: 59

## 2020-07-09 PROCEDURE — 63600175 PHARM REV CODE 636 W HCPCS: Performed by: HOSPITALIST

## 2020-07-09 PROCEDURE — 83735 ASSAY OF MAGNESIUM: CPT

## 2020-07-09 PROCEDURE — 96375 TX/PRO/DX INJ NEW DRUG ADDON: CPT

## 2020-07-09 PROCEDURE — 84100 ASSAY OF PHOSPHORUS: CPT

## 2020-07-09 PROCEDURE — 99225 PR SUBSEQUENT OBSERVATION CARE,LEVEL II: CPT | Mod: ,,, | Performed by: HOSPITALIST

## 2020-07-09 PROCEDURE — 25000003 PHARM REV CODE 250: Performed by: PHYSICIAN ASSISTANT

## 2020-07-09 PROCEDURE — 93010 EKG 12-LEAD: ICD-10-PCS | Mod: ,,, | Performed by: INTERNAL MEDICINE

## 2020-07-09 PROCEDURE — 80053 COMPREHEN METABOLIC PANEL: CPT

## 2020-07-09 PROCEDURE — 83930 ASSAY OF BLOOD OSMOLALITY: CPT

## 2020-07-09 PROCEDURE — 25000003 PHARM REV CODE 250: Performed by: HOSPITALIST

## 2020-07-09 PROCEDURE — 99214 OFFICE O/P EST MOD 30 MIN: CPT | Mod: GC,,, | Performed by: INTERNAL MEDICINE

## 2020-07-09 PROCEDURE — 76937 US GUIDE VASCULAR ACCESS: CPT

## 2020-07-09 PROCEDURE — 99225 PR SUBSEQUENT OBSERVATION CARE,LEVEL II: ICD-10-PCS | Mod: ,,, | Performed by: HOSPITALIST

## 2020-07-09 PROCEDURE — 27000192 HC POUCH, WOUND DRAINAGE COLLECTOR (ANY SIZE)

## 2020-07-09 PROCEDURE — G0378 HOSPITAL OBSERVATION PER HR: HCPCS | Mod: CS

## 2020-07-09 PROCEDURE — 83036 HEMOGLOBIN GLYCOSYLATED A1C: CPT

## 2020-07-09 PROCEDURE — 96366 THER/PROPH/DIAG IV INF ADDON: CPT

## 2020-07-09 RX ORDER — CEFTRIAXONE 1 G/1
1 INJECTION, POWDER, FOR SOLUTION INTRAMUSCULAR; INTRAVENOUS
Status: DISCONTINUED | OUTPATIENT
Start: 2020-07-09 | End: 2020-07-09 | Stop reason: HOSPADM

## 2020-07-09 RX ORDER — NAPROXEN 250 MG/1
500 TABLET ORAL 2 TIMES DAILY WITH MEALS
Status: DISCONTINUED | OUTPATIENT
Start: 2020-07-09 | End: 2020-07-09 | Stop reason: HOSPADM

## 2020-07-09 RX ORDER — PROMETHAZINE HYDROCHLORIDE 25 MG/1
25 TABLET ORAL EVERY 6 HOURS PRN
Status: DISCONTINUED | OUTPATIENT
Start: 2020-07-09 | End: 2020-07-09 | Stop reason: HOSPADM

## 2020-07-09 RX ORDER — SODIUM CHLORIDE 0.9 % (FLUSH) 0.9 %
10 SYRINGE (ML) INJECTION
Status: DISCONTINUED | OUTPATIENT
Start: 2020-07-09 | End: 2020-07-09 | Stop reason: HOSPADM

## 2020-07-09 RX ORDER — SODIUM CHLORIDE 0.9 % (FLUSH) 0.9 %
5 SYRINGE (ML) INJECTION
Status: DISCONTINUED | OUTPATIENT
Start: 2020-07-09 | End: 2020-07-09 | Stop reason: HOSPADM

## 2020-07-09 RX ORDER — METOCLOPRAMIDE 5 MG/1
10 TABLET ORAL
Status: DISCONTINUED | OUTPATIENT
Start: 2020-07-09 | End: 2020-07-09 | Stop reason: HOSPADM

## 2020-07-09 RX ORDER — TALC
6 POWDER (GRAM) TOPICAL NIGHTLY PRN
Status: DISCONTINUED | OUTPATIENT
Start: 2020-07-09 | End: 2020-07-09 | Stop reason: HOSPADM

## 2020-07-09 RX ORDER — ACETAMINOPHEN 325 MG/1
650 TABLET ORAL EVERY 4 HOURS PRN
Status: DISCONTINUED | OUTPATIENT
Start: 2020-07-09 | End: 2020-07-09 | Stop reason: HOSPADM

## 2020-07-09 RX ORDER — HYDROMORPHONE HYDROCHLORIDE 1 MG/ML
0.5 INJECTION, SOLUTION INTRAMUSCULAR; INTRAVENOUS; SUBCUTANEOUS EVERY 4 HOURS PRN
Status: DISCONTINUED | OUTPATIENT
Start: 2020-07-09 | End: 2020-07-09 | Stop reason: HOSPADM

## 2020-07-09 RX ORDER — MAGNESIUM SULFATE HEPTAHYDRATE 40 MG/ML
2 INJECTION, SOLUTION INTRAVENOUS ONCE
Status: COMPLETED | OUTPATIENT
Start: 2020-07-09 | End: 2020-07-09

## 2020-07-09 RX ORDER — ONDANSETRON 8 MG/1
8 TABLET, ORALLY DISINTEGRATING ORAL EVERY 8 HOURS PRN
Status: DISCONTINUED | OUTPATIENT
Start: 2020-07-09 | End: 2020-07-09 | Stop reason: HOSPADM

## 2020-07-09 RX ORDER — CEFTRIAXONE 1 G/1
1 INJECTION, POWDER, FOR SOLUTION INTRAMUSCULAR; INTRAVENOUS DAILY
Qty: 13 G | Refills: 0
Start: 2020-07-09 | End: 2020-07-22

## 2020-07-09 RX ORDER — GLUCAGON 1 MG
1 KIT INJECTION
Status: DISCONTINUED | OUTPATIENT
Start: 2020-07-09 | End: 2020-07-09 | Stop reason: HOSPADM

## 2020-07-09 RX ORDER — AMOXICILLIN 250 MG
1 CAPSULE ORAL 2 TIMES DAILY
Status: DISCONTINUED | OUTPATIENT
Start: 2020-07-09 | End: 2020-07-09 | Stop reason: HOSPADM

## 2020-07-09 RX ORDER — ONDANSETRON 2 MG/ML
8 INJECTION INTRAMUSCULAR; INTRAVENOUS EVERY 8 HOURS PRN
Status: DISCONTINUED | OUTPATIENT
Start: 2020-07-09 | End: 2020-07-09 | Stop reason: HOSPADM

## 2020-07-09 RX ORDER — DICYCLOMINE HYDROCHLORIDE 10 MG/1
10 CAPSULE ORAL 3 TIMES DAILY
Status: DISCONTINUED | OUTPATIENT
Start: 2020-07-09 | End: 2020-07-09 | Stop reason: HOSPADM

## 2020-07-09 RX ORDER — INSULIN ASPART 100 [IU]/ML
1-10 INJECTION, SOLUTION INTRAVENOUS; SUBCUTANEOUS EVERY 6 HOURS PRN
Status: DISCONTINUED | OUTPATIENT
Start: 2020-07-09 | End: 2020-07-09 | Stop reason: HOSPADM

## 2020-07-09 RX ORDER — IPRATROPIUM BROMIDE AND ALBUTEROL SULFATE 2.5; .5 MG/3ML; MG/3ML
3 SOLUTION RESPIRATORY (INHALATION) EVERY 4 HOURS PRN
Status: DISCONTINUED | OUTPATIENT
Start: 2020-07-09 | End: 2020-07-09 | Stop reason: HOSPADM

## 2020-07-09 RX ORDER — ACETAMINOPHEN 500 MG
1000 TABLET ORAL EVERY 6 HOURS PRN
Status: DISCONTINUED | OUTPATIENT
Start: 2020-07-09 | End: 2020-07-09 | Stop reason: HOSPADM

## 2020-07-09 RX ORDER — POTASSIUM CHLORIDE 20 MEQ/1
40 TABLET, EXTENDED RELEASE ORAL ONCE
Status: COMPLETED | OUTPATIENT
Start: 2020-07-09 | End: 2020-07-09

## 2020-07-09 RX ORDER — OXYCODONE HYDROCHLORIDE 5 MG/1
5 TABLET ORAL EVERY 6 HOURS PRN
Status: DISCONTINUED | OUTPATIENT
Start: 2020-07-09 | End: 2020-07-09 | Stop reason: HOSPADM

## 2020-07-09 RX ORDER — SODIUM CHLORIDE AND POTASSIUM CHLORIDE 150; 900 MG/100ML; MG/100ML
1000 INJECTION, SOLUTION INTRAVENOUS CONTINUOUS
Status: DISCONTINUED | OUTPATIENT
Start: 2020-07-09 | End: 2020-07-09 | Stop reason: HOSPADM

## 2020-07-09 RX ORDER — GENTAMICIN SULFATE 1 MG/G
OINTMENT TOPICAL DAILY
Status: DISCONTINUED | OUTPATIENT
Start: 2020-07-09 | End: 2020-07-09 | Stop reason: HOSPADM

## 2020-07-09 RX ORDER — CARVEDILOL 25 MG/1
25 TABLET ORAL DAILY
Status: DISCONTINUED | OUTPATIENT
Start: 2020-07-09 | End: 2020-07-09 | Stop reason: HOSPADM

## 2020-07-09 RX ADMIN — DICYCLOMINE HYDROCHLORIDE 10 MG: 10 CAPSULE ORAL at 10:07

## 2020-07-09 RX ADMIN — CEFTRIAXONE SODIUM 1 G: 1 INJECTION, POWDER, FOR SOLUTION INTRAMUSCULAR; INTRAVENOUS at 04:07

## 2020-07-09 RX ADMIN — SENNOSIDES AND DOCUSATE SODIUM 1 TABLET: 8.6; 5 TABLET ORAL at 10:07

## 2020-07-09 RX ADMIN — POTASSIUM CHLORIDE 40 MEQ: 1500 TABLET, EXTENDED RELEASE ORAL at 10:07

## 2020-07-09 RX ADMIN — METOCLOPRAMIDE 10 MG: 5 TABLET ORAL at 11:07

## 2020-07-09 RX ADMIN — METOCLOPRAMIDE 10 MG: 5 TABLET ORAL at 08:07

## 2020-07-09 RX ADMIN — MAGNESIUM SULFATE IN WATER 2 G: 40 INJECTION, SOLUTION INTRAVENOUS at 10:07

## 2020-07-09 RX ADMIN — ACETAMINOPHEN 1000 MG: 500 TABLET ORAL at 12:07

## 2020-07-09 RX ADMIN — CARVEDILOL 25 MG: 25 TABLET, FILM COATED ORAL at 10:07

## 2020-07-09 RX ADMIN — SODIUM CHLORIDE AND POTASSIUM CHLORIDE 1000 ML: .9; .15 SOLUTION INTRAVENOUS at 01:07

## 2020-07-09 RX ADMIN — OXYCODONE HYDROCHLORIDE 5 MG: 5 TABLET ORAL at 08:07

## 2020-07-09 RX ADMIN — HYDROMORPHONE HYDROCHLORIDE 0.5 MG: 1 INJECTION, SOLUTION INTRAMUSCULAR; INTRAVENOUS; SUBCUTANEOUS at 04:07

## 2020-07-09 NOTE — HPI
Ms. Azucena Morrison is a 70 year old female for whom GI is consulted for evaluation of nausea, vomiting, and abdominal pain. She has a PMH significant for diverticulosis and T2DM and a past surgical history significant for Aiyana procedure in [place] in 2015 for management of recurrent diverticular disease (with post op-course complicated by formation of colocutaneous fistula with on-going abdominal wound and recurrent partial SBO). History obtained from speaking with patient and with assistance of Lithuanian .     Per chart review, she follows weekly with wound care specialist and has received surgical care from [facility] since 2016 after immigrating to the  with reversal of colostomy at that time. Her most recent colonoscopy was in 06/2018 and showed successful anastomosis of sigmoid colon without obvious evidence of fistula. In 03/2019, she underwent ex.lap with lysis of adhesions for attempted treatment of recurrent SBO and attempted bowel resection for treatment of fistula, however procedure was unsuccessful. She is noted to have recurrent issues with wound infections of her colocutaneous fistula with multiple cultures positive for ESBL E.coli and Klebsiella pneumoniae. She also reports on-going issues with purulent drainage from the site. Her most recent wound infection was in 05/2020 secondary to these organisms, for which she is using Gentamicin ointment at the wound site since that time.     She reports onset of constant bilateral lower quadrant abdominal cramping on 06/29 that did not respond to PRN NORCO, with symptom associated with nausea, vomiting, and diarrhea. However, she denies symptom association with fevers, chills, or burning with urination. She reports history of on-going intermittent similar abdominal pain since undergoing I&D for abdominal wound infection in 12/2019, however this pain usually responds to PRN pain medication and is not associated with other symptoms.  She presented to [facility] on 06/29 with CT A/P concerning for possible SBO secondary to adhesions, small bowel cutaneous fistula, and diverticulosis. She was managed with IVF and anti-emetics and underwent a small bowel follow through on 07/02 that was negative for obstruction. She was discharged on 07/03 with resolution of symptoms, however she represented to [facility] ED on 07/05 with recurrent abdominal pain, nausea, and vomiting without diarrhea. She underwent KUB that was negative for SBO or ileus and CT A/P that was concerning for partial SBO in the midline lower abdomen, however she was discharged same day following resolution of symptoms in ED with use of anti-emetics, opioids, and IVF. Her symptoms then returned on 07/07 and she was seen by her wound care specialist on 07/08 and seen to ED here for further management.     Hospital Course: On arrival, her vital signs were unremarkable on room air. Labs were significant for anemia (Hgb 9.4), hyponatremia (134), hypokalemia (3.1), normal renal function, and hypoalbuminemia (2.9). KUB was negative for obstruction and ileus. She was admitted to hospital medicine and initiated on IVF, Ceftriaxone, and electrolyte replacement. GI was consulted for assistance with management.     On initial bedside interview, patient reported resolution of symptoms and is passing gas. Her last bowel movement was on 07/06 and loose in consistency.

## 2020-07-09 NOTE — ASSESSMENT & PLAN NOTE
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 Can continue following up with Dr. Horton in Wound Care clinic. Antibiotic changed to ceftriaxone to treat ESBL Klebsiella pneumoniae. Get PICC placed and continue ceftriaxone at home with home health. Continue home metoclopramide, dicyclomine. Can get outpatient gastric emptying study after treatment of wound infection.

## 2020-07-09 NOTE — SUBJECTIVE & OBJECTIVE
Past Medical History:   Diagnosis Date    Chronic kidney disease, stage 3     Diabetes mellitus     Diabetes mellitus, type 2     Hypertension        Past Surgical History:   Procedure Laterality Date     SECTION, CLASSIC      x2    CHOLECYSTECTOMY      [facility] SURGERY      [facility]    COLONOSCOPY N/A 2018    Procedure: COLONOSCOPY;  Surgeon: Gibran Aguayo MD;  Location: [facility] ENDO;  Service: Endoscopy;  Laterality: N/A;    COLOSTOMY Left     CYSTOSCOPY WITH URETEROSCOPY, RETROGRADE PYELOGRAPHY, AND INSERTION OF STENT Left 2019    Procedure: CYSTOSCOPY, WITH RETROGRADE PYELOGRAM AND URETERAL STENT INSERTION with placement of a muir cath;  Surgeon: Ulisses Horton MD;  Location: [facility] OR;  Service: General;  Laterality: Left;    INCISION AND DRAINAGE OF ABSCESS N/A 2019    Procedure: INCISION AND DRAINAGE, ABSCESS;  Surgeon: Ulisses Horton MD;  Location: [facility] OR;  Service: General;  Laterality: N/A;    INCISION OF ABDOMINAL WALL N/A 8/10/2018    Procedure: INCISION, ABDOMINAL WALL;  Surgeon: Ulisses Horton MD;  Location: [facility] OR;  Service: General;  Laterality: N/A;    INCISIONAL HERNIA REPAIR Right        Review of patient's allergies indicates:   Allergen Reactions    Chlorhexidine gluconate Rash     Chlorhexidine/alcohol wipes. Rash and blistering.     Family History     Problem Relation (Age of Onset)    Alcohol abuse Father    Diabetes Sister, Brother    Early death Grandchild    Heart disease Mother, Sister    Hyperlipidemia Mother, Sister    Hypertension Mother, Sister    Stroke Mother, Sister        Tobacco Use    Smoking status: Former Smoker     Types: Cigarettes     Quit date: 2000     Years since quittin.5    Smokeless tobacco: Never Used   Substance and Sexual Activity    Alcohol use: No    Drug use: No    Sexual activity: Not Currently     Review of Systems   Constitutional: Positive for unexpected weight change.  Negative for appetite change, chills and fever.   HENT: Negative for congestion and trouble swallowing.    Eyes: Negative for pain and redness.   Respiratory: Negative for cough and shortness of breath.    Cardiovascular: Negative for chest pain, palpitations and leg swelling.   Gastrointestinal: Negative for abdominal pain, constipation, diarrhea, nausea and vomiting.   Genitourinary: Negative for difficulty urinating and dysuria.   Musculoskeletal: Negative for back pain and neck pain.   Skin: Positive for wound. Negative for rash.   Neurological: Negative for dizziness, light-headedness and headaches.     Objective:     Vital Signs (Most Recent):  Temp: 97.9 °F (36.6 °C) (07/09/20 0808)  Pulse: 68 (07/09/20 0900)  Resp: 12 (07/09/20 0900)  BP: 134/80 (07/09/20 0900)  SpO2: 98 % (07/09/20 0900) Vital Signs (24h Range):  Temp:  [97.9 °F (36.6 °C)-98.5 °F (36.9 °C)] 97.9 °F (36.6 °C)  Pulse:  [68-88] 68  Resp:  [12-18] 12  SpO2:  [96 %-100 %] 98 %  BP: (124-145)/(74-80) 134/80     Weight: 70.4 kg (155 lb 3.3 oz) (07/09/20 0750)  Body mass index is 29.33 kg/m².      Intake/Output Summary (Last 24 hours) at 7/9/2020 0946  Last data filed at 7/9/2020 0150  Gross per 24 hour   Intake 100 ml   Output --   Net 100 ml       Lines/Drains/Airways     Peripheral Intravenous Line                 Peripheral IV - Single Lumen 07/09/20 0008 20 G Right Hand less than 1 day                Physical Exam  Constitutional:       General: She is not in acute distress.     Appearance: Normal appearance. She is not ill-appearing.   Eyes:      General: No scleral icterus.     Conjunctiva/sclera: Conjunctivae normal.      Pupils: Pupils are equal, round, and reactive to light.   Cardiovascular:      Rate and Rhythm: Normal rate and regular rhythm.      Pulses: Normal pulses.      Heart sounds: Normal heart sounds.   Pulmonary:      Effort: Pulmonary effort is normal. No respiratory distress.      Breath sounds: Normal breath sounds.    Abdominal:      General: A surgical scar is present. Bowel sounds are normal. There is no distension.      Palpations: Abdomen is soft.      Tenderness: There is no abdominal tenderness.      Hernia: No hernia is present.      Comments: Prior well-healed surgical scar in midline. There is a former colostomy site in left lower quadrant with slight purulent discharge. There is an ulceration just inferior to the periumbilical area with purulent discharge.    Musculoskeletal:         General: No swelling.   Skin:     General: Skin is warm and dry.      Findings: No bruising or rash.   Neurological:      Mental Status: She is alert and oriented to person, place, and time.         Significant Labs:  All pertinent lab results from the last 24 hours have been reviewed.    Significant Imaging:  Imaging results within the past 24 hours have been reviewed.

## 2020-07-09 NOTE — PLAN OF CARE
[facility]    HOME HEALTH ORDERS  FACE TO FACE ENCOUNTER    Patient Name: Azucena Morrison  YOB: 1950    PCP: Pj Sanches MD   PCP Address: [address] [place] [address] / [id number]  PCP Phone Number: [phone]  PCP Fax: [phone]    Encounter Date: 07/09/2020    Admit to Home Health    Diagnoses:  Active Hospital Problems    Diagnosis  POA    *Hypokalemia due to excessive gastrointestinal loss of potassium [E87.6]  Yes    Chronic kidney disease, stage 3 [N18.3]  Yes     Chronic    Infection due to ESBL-producing Klebsiella pneumoniae [A49.8, Z16.12]  Yes    Partial obstruction of small intestine [K56.600]  Yes    Colocutaneous fistula [K63.2]  Yes     Chronic    Generalized abdominal pain [R10.84]  Yes     Chronic    Essential hypertension [I10]  Yes     Chronic    Type 2 diabetes mellitus without complication, with long-term current use of insulin [E11.9, Z79.4]  Not Applicable     Chronic    History of hemicolectomy [Z90.49]  Not Applicable     Chronic      Resolved Hospital Problems   No resolved problems to display.       Future Appointments   Date Time Provider Department Center   7/15/2020  8:30 AM Ulisses Horton MD [facility] W[facility]     Follow-up Information     Ulisses Horton MD.    Specialties: General Surgery, Surgery  Contact information:  [address]  [address]  [facility] [address] [phone]                     I have seen and examined this patient face to face today. My clinical findings that support the need for the home health skilled services and home bound status are the following:  Medical restrictions requiring assistance of another human to leave home due to  IV infusion Needs.    Allergies:  Review of patient's allergies indicates:   Allergen Reactions    Chlorhexidine gluconate Rash     Chlorhexidine/alcohol wipes. Rash and blistering.       Diet: diabetic diet: 1800 calorie    Activities: activity as  tolerated    Nursing:   SN to complete comprehensive assessment including routine vital signs. Instruct on disease process and s/s of complications to report to MD. Review/verify medication list sent home with the patient at time of discharge  and instruct patient/caregiver as needed. Frequency may be adjusted depending on start of care date.    Notify MD if SBP > 160 or < 90; DBP > 90 or < 50; HR > 120 or < 50; Temp > 101      CONSULTS:    N/A    MISCELLANEOUS CARE:  Home Infusion Therapy:   SN to perform Infusion Therapy/Central Line Care.  Review Central Line Care & Central Line Flush with patient.    Administer (drug and dose): ceftriaxone 1 gram daily until 7/22/2020    Last dose given: 7/9/2020                         Home dose due: 7/10/2020    Scrub the Hub: Prior to accessing the line, always perform a 30 second alcohol scrub  Each lumen of the central line is to be flushed at least daily with 10 mL Normal Saline and 3 mL Heparin flush (10 units/mL)  Skilled Nurse (SN) may draw blood from IV access  Blood Draw Procedure:   - Aspirate at least 5 mL of blood   - Discard   - Obtain specimen   - Change injection cap   - Flush with 20 mL Normal Saline followed by a                 3-5 mL Heparin flush (10 units/mL)  Central :   - Sterile dressing changes are done weekly and as needed.   - Use chlor-hexadine scrub to cleanse site, apply Biopatch to insertion site,       apply securement device dressing   - Injection caps are changed weekly and after EVERY lab draw.   - If sterile gauze is under dressing to control oozing,                 dressing change must be performed every 24 hours until gauze is not needed.    Remove PICC after last dose of ceftriaxone    WOUND CARE ORDERS  Follow up with Dr. Ulisses Hroton in Wound Care clinic.      Medications: Review discharge medications with patient and family and provide education.      Current Discharge Medication List      START taking these  medications    Details   cefTRIAXone (ROCEPHIN) 1 gram injection Inject 1 g into the vein once daily. End date 7/22/2020 for 13 days  Qty: 13 g, Refills: 0    Associated Diagnoses: Infection due to ESBL-producing Klebsiella pneumoniae         CONTINUE these medications which have NOT CHANGED    Details   acetaminophen (TYLENOL) 325 MG tablet Take 2 tablets (650 mg total) by mouth every 8 (eight) hours as needed.  Refills: 0      betamethasone valerate 0.1% (VALISONE) 0.1 % Crea Apply around wound as needed daily for itching.  Qty: 45 g, Refills: 0      !! blood sugar diagnostic Strp Use to test blood sugar twice daily as directed.  Qty: 100 each, Refills: 0      blood-glucose meter Misc Use as directed to test blood sugar twice daily  Qty: 1 each, Refills: 0      carvedilol (COREG) 25 MG tablet Take 1 tablet (25 mg total) by mouth once daily.  Qty: 30 tablet, Refills: 1      clotrimazole-betamethasone 1-0.05% (LOTRISONE) cream Apply topically locally as directed  Qty: 15 g, Refills: 0      dicyclomine (BENTYL) 10 MG capsule TAKE 1 CAPSULE BY MOUTH THREE TIMES A DAY  Qty: 30 capsule, Refills: 2      gentamicin (GARAMYCIN) 0.1 % ointment Apply topically to affected area daily  Qty: 30 g, Refills: 3      hydroCHLOROthiazide (HYDRODIURIL) 25 MG tablet Take 1 tablet (25 mg total) by mouth once daily.  Qty: 30 tablet, Refills: 0      !! HYDROcodone-acetaminophen (NORCO) 5-325 mg per tablet Take 1 tablet by mouth every 4 hours.  Qty: 24 tablet, Refills: 0    Comments: Quantity prescribed more than 7 day supply? Yes, quantity medically necessary      !! HYDROcodone-acetaminophen (NORCO) 5-325 mg per tablet Take 1 tablet by mouth every 4 (four) hours as needed.  Qty: 24 tablet, Refills: 0    Comments: Quantity prescribed more than 7 day supply? Yes, quantity medically necessary      insulin glargine 100 units/mL (3mL) SubQ pen Inject subcutaneously into the skin 10 units every morning.  Qty: 15 mL, Refills: 3      lancets 30  "gauge Misc Use to test blood sugar twice daily.  Qty: 100 each, Refills: 0      metFORMIN (GLUCOPHAGE) 1000 MG tablet Take 1 tablet (1,000 mg total) by mouth 2 (two) times daily.  Qty: 180 tablet, Refills: 3      metoclopramide HCl (REGLAN) 10 MG tablet Take 1 tablet (10 mg total) by mouth 4 (four) times daily.  Qty: 120 tablet, Refills: 1      naproxen (NAPROSYN) 500 MG tablet Take 1 tablet (500 mg total) by mouth 2 (two) times daily with meals.  Qty: 60 tablet, Refills: 1      ondansetron (ZOFRAN) 8 MG tablet Take 1 tablet (8 mg total) by mouth 2 (two) times daily as needed.  Qty: 40 tablet, Refills: 0      pen needle, diabetic (BD ULTRA-FINE ONEIL PEN NEEDLE) 32 gauge x 5/32" Ndle Use to inject insulin daily  Qty: 100 each, Refills: 0    Comments: daily per MD ambrose      !! TRUETEST TEST STRIPS Strp        !! - Potential duplicate medications found. Please discuss with provider.      STOP taking these medications       amoxicillin-clavulanate 875-125mg (AUGMENTIN) 875-125 mg per tablet Comments:   Reason for Stopping:         doxycycline (VIBRA-TABS) 100 MG tablet Comments:   Reason for Stopping:               I certify that this patient is confined to her home and needs intermittent skilled nursing care.    Electronically signed  Gil Knight MD  [facility]  "

## 2020-07-09 NOTE — ED PROVIDER NOTES
"Encounter Date: 7/8/2020    SCRIBE #1 NOTE: I, Khai Barnett, am scribing for, and in the presence of,  Mary Rodriguez MD. I have scribed the following portions of the note - Other sections scribed: HPI,ROS, PE.       History     Chief Complaint   Patient presents with    Post-op Problem     abd surg, mika, more infection, tx by wound care, on antibiotics     Time of initial exam: 8:00 PM     is a 70 y.o. female who has a past medical history of diabetes,hypertension, complicated surgical abdominal history with poorly healing wounds and colocutaneous fistula presenting for persistent vomiting.. She was recently admitted into [facility] for a duration of 5 days. Once discharged she came back same day with the same symptoms. She said she was referred her for second opinion for GI. The patient reports that she has been attending a weekly appointment with wound care. During her most recent wound care visit, they reported some drainage from the wound.   Patient rates the "extreme" abdominal pain as a 9/10. The pain has been persistent for the last 4 yearr, however since the last recent surgery she has notice a increased drainige.  She notices that once her left sided abdominal pain begins, she notices her ride side abdomen beginning to swell. She also states that she has noticed a right leg rash that has been persistent for approximately 2 months  Patient is currently taking augmetin after recent discharge. She reports having a normal bowel movement, states that her last bowel movement was 1 day ago. (+) flatus.  Denies fever, and chills. Positive for nausea, diarrhea, vomiting and apetite change (decreased) unable to hold own any food or liquids.       The history is provided by the patient and medical records. The history is limited by a language barrier. A  was used.     Review of patient's allergies indicates:   Allergen Reactions    Chlorhexidine gluconate Rash     " Chlorhexidine/alcohol wipes. Rash and blistering.     Past Medical History:   Diagnosis Date    Diabetes mellitus     Diabetes mellitus, type 2     Hypertension      Past Surgical History:   Procedure Laterality Date     SECTION, CLASSIC      x2    CHOLECYSTECTOMY      COLON SURGERY      [facility] [place]    COLONOSCOPY N/A 2018    Procedure: COLONOSCOPY;  Surgeon: Gibran Aguayo MD;  Location: [facility] ENDO;  Service: Endoscopy;  Laterality: N/A;    COLOSTOMY Left     CYSTOSCOPY WITH URETEROSCOPY, RETROGRADE PYELOGRAPHY, AND INSERTION OF STENT Left 2019    Procedure: CYSTOSCOPY, WITH RETROGRADE PYELOGRAM AND URETERAL STENT INSERTION with placement of a muir cath;  Surgeon: Ulisses Horton MD;  Location: [facility] OR;  Service: General;  Laterality: Left;    INCISION AND DRAINAGE OF ABSCESS N/A 2019    Procedure: INCISION AND DRAINAGE, ABSCESS;  Surgeon: Ulisses Horton MD;  Location: [facility] OR;  Service: General;  Laterality: N/A;    INCISION OF ABDOMINAL WALL N/A 8/10/2018    Procedure: INCISION, ABDOMINAL WALL;  Surgeon: Ulisses Horton MD;  Location: [facility] OR;  Service: General;  Laterality: N/A;    INCISIONAL HERNIA REPAIR Right 2010     Family History   Problem Relation Age of Onset    Stroke Mother     No Known Problems Father     Stroke Sister      Social History     Tobacco Use    Smoking status: Never Smoker    Smokeless tobacco: Never Used   Substance Use Topics    Alcohol use: No    Drug use: No     Review of Systems   Constitutional: Positive for appetite change (decreased). Negative for chills and fever.   HENT: Negative for sore throat.    Respiratory: Negative for shortness of breath.    Cardiovascular: Negative for chest pain.   Gastrointestinal: Positive for abdominal pain, diarrhea, nausea and vomiting.   Genitourinary: Negative for difficulty urinating and dysuria.   Musculoskeletal: Negative for back pain.   Skin: Positive for rash (Right leg).    Neurological: Negative for weakness.   Hematological: Does not bruise/bleed easily.       Physical Exam     Initial Vitals [07/08/20 1822]   BP Pulse Resp Temp SpO2   (!) 145/76 87 18 98.5 °F (36.9 °C) 98 %      MAP       --         Physical Exam    Nursing note and vitals reviewed.  Constitutional: No distress.   HENT:   Mouth/Throat: Oropharynx is clear and moist.   Eyes: Conjunctivae are normal. No scleral icterus.   Neck: Neck supple. No tracheal deviation present.   Cardiovascular: Normal rate, regular rhythm and intact distal pulses.   Pulmonary/Chest: Breath sounds normal. She has no wheezes.   Abdominal: Soft. Bowel sounds are normal. There is abdominal tenderness (mild generalized). There is no rebound and no guarding.   Midline scar, no distension.  (+) colocutaneous fistula with output.  Left abd stoma from previous colostomy.  See pics   Musculoskeletal: No edema.   Neurological: She is alert and oriented to person, place, and time.   Skin: Skin is warm and dry. Rash (scalley irritated rash without drainage.) noted. Rash is papular.                 ED Course   Procedures  Labs Reviewed   CBC W/ AUTO DIFFERENTIAL - Abnormal; Notable for the following components:       Result Value    Hemoglobin 11.0 (*)     Hematocrit 35.9 (*)     Mean Corpuscular Volume 75 (*)     Mean Corpuscular Hemoglobin 22.8 (*)     Mean Corpuscular Hemoglobin Conc 30.6 (*)     RDW 16.1 (*)     Platelets 503 (*)     MPV 8.1 (*)     All other components within normal limits   COMPREHENSIVE METABOLIC PANEL - Abnormal; Notable for the following components:    Sodium 131 (*)     Potassium 2.8 (*)     Chloride 88 (*)     CO2 31 (*)     Glucose 141 (*)     Total Protein 9.2 (*)     eGFR if  53.3 (*)     eGFR if non  46.2 (*)     All other components within normal limits   LACTIC ACID, PLASMA   LIPASE   SARS-COV-2 RNA AMPLIFICATION, QUAL   HEMOGLOBIN A1C   POCT GLUCOSE MONITORING CONTINUOUS     EKG  Readings: (Independently Interpreted)   EKG:  Normal sinus at 74, left anterior fascicular block, no ST elevations or depressions, no U-waves       Imaging Results    None          Medical Decision Making:   History:   Old Medical Records: I decided to obtain old medical records.  Old Records Summarized: records from clinic visits.  Initial Assessment:   Emergent evaluation a 70-year-old female history of complicated abdominal surgery, diabetes here today for frequent vomiting.  Vital signs stable.  Patient is well-appearing, no acute distress.    Patient has had frequent admissions for small-bowel obstructions, most recently on 6/29 and another admission on 07/05 for gastroparesis.     Small-bowel follow through on 07/02/ was within normal limits      Differential Diagnosis:   Electrolyte derangement, dehydration, gastroparesis, low suspicion for small bowel obstruction at this time as she is having normal bowel movements, w/o distension, and (+) flatus.   Independently Interpreted Test(s):   I have ordered and independently interpreted EKG Reading(s) - see prior notes  Clinical Tests:   Lab Tests: Reviewed and Ordered  Medical Tests: Ordered and Reviewed            Scribe Attestation:   Scribe #1: I performed the above scribed service and the documentation accurately describes the services I performed. I attest to the accuracy of the note.            ED Course as of Jul 09 0017 Wed Jul 08, 2020 2236 WBC: 9.03 [GM]   2236 Lipase: 36 [GM]   2236 Sodium(!): 131 [GM]   2236 K repleted with KCL.  EKG ordered.     Potassium(!): 2.8 [GM]   2236 Chloride(!): 88 [GM]   2236 CO2(!): 31 [GM]   2236 Lactate, Hesham: 0.9 [GM]      ED Course User Index  [GM] Mary Rodriguez MD         labs consistent with significant GI loss.  KCL IV and orally.   Patient will be admitted to Hospital Medicine.          Clinical Impression:       ICD-10-CM ICD-9-CM   1. Hypokalemia  E87.6 276.8   2. Colocutaneous fistula  K63.2 569.81                                 Mary Rodriguez MD  07/09/20 0029

## 2020-07-09 NOTE — ED NOTES
Pt still receiving first K rider that had to be slowed down due to burning sensation. Will start second IV potassium Chloride when finished.

## 2020-07-09 NOTE — CONSULTS
Placed 18g X 10cm midline in right brachial vein of RUE using realtime ultrasound guidance. Lot#[id number]. Remove on or before 08/07/2020.

## 2020-07-09 NOTE — PROGRESS NOTES
[facility] Hospital Medicine  Progress Note    Patient Name: Azucena Morrison  MRN: [id number]  Patient Class: OP- Observation   Admission Date: 7/8/2020  Length of Stay: 0 days  Attending Physician: Gil Knight MD  Primary Care Provider: Pj Sanches MD    [facility] Medicine Team: [facility] MED D Gil Knight MD    Subjective:     Principal Problem:Hypokalemia due to excessive gastrointestinal loss of potassium        HPI:  Azucena Morrison is a 70 year old Tamazight-speaking [place]ese-American woman with hypertension, diabetes mellitus type 2, chronic kidney disease stage 3, history of cholecystectomy in 1997, history of hemicolectomy in 2015, chronic colocutaneous fistula status post small bowel and fistula resection, colostomy takedown, and colorectal anastomosis on 11/14/2016. She lives in [place], [place]. Her primary care physician is Dr. Pj Sanches. Her wound care surgeon is Dr. Ulisses Horton.    She had an abdominal wall abscess associated with her chronic fistula with culture on 5/21/2020 growing ESBL Klebsiella pneumoniae and Escherichia coli.    She was hospitalized at [facility] from 6/29/2020 to 7/3/2020 for nausea and vomiting with abdominal distension. Upper GI series with small bowel follow-through showed no small bowel obstruction. She was prescribed amoxicillin-clavulanate, hydrocodone-acetaminophen, and dicyclomine.    She was seen at [facility] Emergency Department on 7/5/2020 for recurrent abdominal pain and nausea. She was felt to have diabetic gastroparesis and urged to take her recently prescribed metoclopramide. Abdominal wound culture grew Klebsiella pneumoniae sensitive to ceftriaxone, cefepime, gentamicin, meropenem, and amikacin, but resistant to cefazolin, penicillins, fluoroquinolones, tetracycline, tobramycin, and trimethoprim-sulfamethoxazole.   She saw Dr. Horton in clinic on 7/8/2020. She reported increased  pain and wound drainage, and persistent nausea and vomiting. She was advised to follow up with Gastroenterology and go to the emergency department. She went to [facility] Emergency [facility]. Her last bowel movement was two days prior and she was passing flatus. Labs showed hypokalemia (potassium 2.8 mmol/L). She was given intravenous and oral potassium chloride. She was admitted to Hospital Medicine Team D.    Overview/Hospital Course:  She was put on normal saline with potassium chloride. Ceftriaxone was started based on recent culture results. Gastroenterology was consulted.     Interval History: No vomiting overnight. Understands some English. I explained that she was on an antibiotic that was not working, and will need to go home with an IV. She understood.    Review of Systems   Constitutional: Negative for chills and fever.   Respiratory: Negative for cough and shortness of breath.    Neurological: Negative for seizures.     Objective:     Vital Signs (Most Recent):  Temp: 97.9 °F (36.6 °C) (07/09/20 0808)  Pulse: 68 (07/09/20 0900)  Resp: 12 (07/09/20 0900)  BP: 134/80 (07/09/20 0900)  SpO2: 98 % (07/09/20 0900) Vital Signs (24h Range):  Temp:  [97.9 °F (36.6 °C)-98.5 °F (36.9 °C)] 97.9 °F (36.6 °C)  Pulse:  [68-88] 68  Resp:  [12-18] 12  SpO2:  [96 %-100 %] 98 %  BP: (124-145)/(74-80) 134/80     Weight: 70.4 kg (155 lb 3.3 oz)  Body mass index is 29.33 kg/m².    Intake/Output Summary (Last 24 hours) at 7/9/2020 1146  Last data filed at 7/9/2020 0150  Gross per 24 hour   Intake 100 ml   Output --   Net 100 ml      Physical Exam  Vitals signs and nursing note reviewed.   Constitutional:       General: She is not in acute distress.     Appearance: She is not toxic-appearing.   Pulmonary:      Effort: Pulmonary effort is normal. No respiratory distress.   Abdominal:      Palpations: Abdomen is soft.      Tenderness: There is no abdominal tenderness.      Comments: Left lower abdomen and  midline wounds   Skin:     General: Skin is warm and dry.   Neurological:      Mental Status: She is alert.   Psychiatric:         Mood and Affect: Mood and affect normal.         Thought Content: Thought content normal.         Significant Labs: All pertinent labs within the past 24 hours have been reviewed.  Recent Labs   Lab 07/05/20  1514 07/08/20 2029 07/09/20  0424    131* 134*   K 3.2* 2.8* 3.1*   CL 97 88* 94*   CO2 26 31* 26   BUN 11 17 15   CREATININE 1.1 1.2 1.1   CALCIUM 8.9 9.5 8.6*   PROT 8.5* 9.2* 7.4   BILITOT 0.3 0.3 0.3   ALKPHOS 69 83 69   ALT 10 14 10   AST 16 16 13       Significant Imaging: I have reviewed all pertinent imaging results/findings within the past 24 hours.   X-Ray Abdomen Portable 7/09/20: FINDINGS: Bilateral hemidiaphragm and inferior aspect of the pelvis are excluded from the field of view.  Mild distention of bowel in the lower abdomen, similar to the prior radiograph and much better characterized on prior CT.  Mild stool burden noted in the colon.  Surgical clips overlie the right upper quadrant of the abdomen from prior cholecystectomy.  Bones are stable.   Impression: Stable mild distention of bowel in the lower abdomen, much better characterized on prior CT scan.  No worsening of bowel distention identified.       Assessment/Plan:      * Hypokalemia due to excessive gastrointestinal loss of potassium  Hypomagnesemia  Replete potassium and magnesium.    Chronic kidney disease, stage 3  Stable.    Generalized abdominal pain  Infection due to ESBL-producing Klebsiella pneumoniae  Postprocedural intraabdominal abscess  Colocutaneous fistula  History of hemicolectomy  Gastroenterology and Wound Care consulted. Can continue following up with Dr. Horton in Wound Care clinic. Antibiotic changed to ceftriaxone to treat ESBL Klebsiella pneumoniae. Get PICC placed and continue ceftriaxone at home with home health. Continue home metoclopramide, dicyclomine. Can get outpatient  gastric emptying study after treatment of wound infection.    Essential hypertension  Continue home carvedilol. Holding home hydrochlorothiazide.     Type 2 diabetes mellitus without complication, with long-term current use of insulin  Diabetes with skin complication  Holding home metformin. Insulin aspart sliding scale. Monitor Accuchecks.      VTE Risk Mitigation (From admission, onward)         Ordered     IP VTE LOW RISK PATIENT  Once      07/09/20 0126     Place sequential compression device  Until discontinued      07/09/20 0126     Place SHIRLEY hose  Until discontinued      07/09/20 0126                Disposition: Home with home health after PICC placed.      Gil Knight MD  Department of Hospital Medicine   [facility]

## 2020-07-09 NOTE — SUBJECTIVE & OBJECTIVE
Past Medical History:   Diagnosis Date    Chronic kidney disease, stage 3     Diabetes mellitus     Diabetes mellitus, type 2     Hypertension        Past Surgical History:   Procedure Laterality Date     SECTION, CLASSIC      x2    CHOLECYSTECTOMY      COLON SURGERY      [facility]    COLONOSCOPY N/A 2018    Procedure: COLONOSCOPY;  Surgeon: Gibran Aguayo MD;  Location: [facility] ENDO;  Service: Endoscopy;  Laterality: N/A;    COLOSTOMY Left     CYSTOSCOPY WITH URETEROSCOPY, RETROGRADE PYELOGRAPHY, AND INSERTION OF STENT Left 2019    Procedure: CYSTOSCOPY, WITH RETROGRADE PYELOGRAM AND URETERAL STENT INSERTION with placement of a muir cath;  Surgeon: Ulisses Horton MD;  Location: [facility] OR;  Service: General;  Laterality: Left;    INCISION AND DRAINAGE OF ABSCESS N/A 2019    Procedure: INCISION AND DRAINAGE, ABSCESS;  Surgeon: Ulisses Horton MD;  Location: [facility] OR;  Service: General;  Laterality: N/A;    INCISION OF ABDOMINAL WALL N/A 8/10/2018    Procedure: INCISION, ABDOMINAL WALL;  Surgeon: Ulisses Horton MD;  Location: [facility] OR;  Service: General;  Laterality: N/A;    INCISIONAL HERNIA REPAIR Right        Review of patient's allergies indicates:   Allergen Reactions    Chlorhexidine gluconate Rash     Chlorhexidine/alcohol wipes. Rash and blistering.       No current facility-administered medications on file prior to encounter.      Current Outpatient Medications on File Prior to Encounter   Medication Sig    acetaminophen (TYLENOL) 325 MG tablet Take 2 tablets (650 mg total) by mouth every 8 (eight) hours as needed.    amoxicillin-clavulanate 875-125mg (AUGMENTIN) 875-125 mg per tablet Take 1 tablet by mouth twice a day    betamethasone valerate 0.1% (VALISONE) 0.1 % Crea Apply around wound as needed daily for itching.    blood sugar diagnostic Strp Use to test blood sugar twice daily as directed.    blood-glucose meter Misc Use as directed  "to test blood sugar twice daily    carvedilol (COREG) 25 MG tablet Take 1 tablet (25 mg total) by mouth once daily.    clotrimazole-betamethasone 1-0.05% (LOTRISONE) cream Apply topically locally as directed    dicyclomine (BENTYL) 10 MG capsule TAKE 1 CAPSULE BY MOUTH THREE TIMES A DAY    doxycycline (VIBRA-TABS) 100 MG tablet Take 1 tablet (100 mg total) by mouth 2 (two) times daily.    gentamicin (GARAMYCIN) 0.1 % ointment Apply topically to affected area daily    hydroCHLOROthiazide (HYDRODIURIL) 25 MG tablet Take 1 tablet (25 mg total) by mouth once daily.    HYDROcodone-acetaminophen (NORCO) 5-325 mg per tablet Take 1 tablet by mouth every 4 hours. (Patient taking differently: every 4 (four) hours as needed. )    HYDROcodone-acetaminophen (NORCO) 5-325 mg per tablet Take 1 tablet by mouth every 4 (four) hours as needed.    insulin glargine 100 units/mL (3mL) SubQ pen Inject subcutaneously into the skin 10 units every morning.    lancets 30 gauge Misc Use to test blood sugar twice daily.    metFORMIN (GLUCOPHAGE) 1000 MG tablet Take 1 tablet (1,000 mg total) by mouth 2 (two) times daily.    metoclopramide HCl (REGLAN) 10 MG tablet Take 1 tablet (10 mg total) by mouth 4 (four) times daily.    naproxen (NAPROSYN) 500 MG tablet Take 1 tablet (500 mg total) by mouth 2 (two) times daily with meals.    ondansetron (ZOFRAN) 8 MG tablet Take 1 tablet (8 mg total) by mouth 2 (two) times daily as needed.    pen needle, diabetic (BD ULTRA-FINE ONEIL PEN NEEDLE) 32 gauge x 5/32" Ndle Use to inject insulin daily    TRUETEST TEST STRIPS Strp      Family History     Problem Relation (Age of Onset)    No Known Problems Father    Stroke Mother, Sister        Tobacco Use    Smoking status: Never Smoker    Smokeless tobacco: Never Used   Substance and Sexual Activity    Alcohol use: No    Drug use: No    Sexual activity: Not Currently     Review of Systems   Constitutional: Positive for appetite change. " Negative for activity change, chills, fatigue, fever and unexpected weight change.   HENT: Negative for congestion, rhinorrhea, sore throat, trouble swallowing and voice change.    Eyes: Negative for visual disturbance.   Respiratory: Negative for cough, choking, chest tightness, shortness of breath and wheezing.    Cardiovascular: Negative for chest pain, palpitations and leg swelling.   Gastrointestinal: Positive for abdominal distention, abdominal pain, constipation, nausea and vomiting. Negative for anal bleeding, blood in stool and diarrhea.   Endocrine: Negative for cold intolerance, heat intolerance, polydipsia and polyuria.   Genitourinary: Negative for dysuria, flank pain, frequency, hematuria and urgency.   Musculoskeletal: Negative for arthralgias, back pain, joint swelling and myalgias.   Skin: Negative for color change and rash.   Neurological: Negative for dizziness, seizures, syncope, facial asymmetry, speech difficulty, weakness, light-headedness, numbness and headaches.   Hematological: Negative for adenopathy. Does not bruise/bleed easily.   Psychiatric/Behavioral: Negative for agitation, confusion, hallucinations and suicidal ideas.     Objective:     Vital Signs (Most Recent):  Temp: 98.5 °F (36.9 °C) (07/08/20 1822)  Pulse: 87 (07/09/20 0117)  Resp: 18 (07/09/20 0117)  BP: 133/77 (07/09/20 0117)  SpO2: 100 % (07/09/20 0117) Vital Signs (24h Range):  Temp:  [98.5 °F (36.9 °C)] 98.5 °F (36.9 °C)  Pulse:  [87] 87  Resp:  [18] 18  SpO2:  [98 %-100 %] 100 %  BP: (133-145)/(76-77) 133/77     Weight: 70.3 kg (155 lb)  Body mass index is 25.79 kg/m².    Physical Exam  Constitutional:       General: She is not in acute distress.     Appearance: She is well-developed.   HENT:      Head: Normocephalic and atraumatic.   Eyes:      Pupils: Pupils are equal, round, and reactive to light.   Neck:      Musculoskeletal: Neck supple.      Thyroid: No thyromegaly.      Vascular: No carotid bruit.   Cardiovascular:       Rate and Rhythm: Normal rate and regular rhythm.      Heart sounds: No murmur. No gallop.    Pulmonary:      Effort: Pulmonary effort is normal. No respiratory distress.      Breath sounds: Normal breath sounds. No wheezing.   Abdominal:      General: A surgical scar is present. Bowel sounds are normal. There is no distension.      Palpations: Abdomen is soft. There is no hepatomegaly, splenomegaly or mass.      Tenderness: There is generalized abdominal tenderness.      Comments: Midline scar, no distension.  (+) colocutaneous fistula with output.  Left abdominal stoma from previous colostomy.  See ER note for images.   Musculoskeletal: Normal range of motion.         General: No deformity.   Skin:     General: Skin is warm and dry.      Findings: No rash.   Neurological:      Mental Status: She is alert and oriented to person, place, and time.      Cranial Nerves: No cranial nerve deficit.      Sensory: No sensory deficit.           CRANIAL NERVES     CN III, IV, VI   Pupils are equal, round, and reactive to light.       Significant Labs:   CBC:   Recent Labs   Lab 07/08/20 2029   WBC 9.03   HGB 11.0*   HCT 35.9*   *     CMP:   Recent Labs   Lab 07/08/20 2029   *   K 2.8*   CL 88*   CO2 31*   *   BUN 17   CREATININE 1.2   CALCIUM 9.5   PROT 9.2*   ALBUMIN 3.6   BILITOT 0.3   ALKPHOS 83   AST 16   ALT 14   ANIONGAP 12   EGFRNONAA 46.2*     Lipase:   Recent Labs   Lab 07/08/20 2029   LIPASE 36       Significant Imaging: I have reviewed all pertinent imaging results/findings within the past 24 hours.

## 2020-07-09 NOTE — ASSESSMENT & PLAN NOTE
This is a 70 year old [place] female with PMH significant for diverticulosis and well-controlled T2DM and a past surgical history significant for Aiyana procedure in [place] in 2015 for management of recurrent diverticular disease (with post op-course complicated by formation of colocutaneous fistula with on-going abdominal wound with recurrent infections secondary to E.coli and K.pneumoniae and recurrent partial SBO) who presented to [facility] on 07/08/2020 with on-going nausea, vomiting, and bilateral lower quadrant abdominal cramping. She is status post hospitalization from 06/29 to 07/03 at [facility] for management of partial SBO with similar associated symptoms. Her most recent CT A/P on 07/05 showed on-going partial SBO of midline abdomen, however without obvious obstruction or ileus on KUB on admission here. She is status post initiation of IVF, pain control, and anti-emetics with resolution of symptoms.     Our recommendations are as follows:   -Etiology of symptoms is likely secondary to recurrent partial SBO that may be secondary to expected adhesions vs use of opioids and antispasmodics at home.  -No indication for EGD or colonoscopy now. Recommend general surgery evaluation for comment on management of recurrent SBO; may be done outpatient.   -Recommend continued management of suspected SBO with IVF, anti-emetics, and minimize opioid usage if possible.   -Discontinuation of home BENTYL.   -Continued PRN electrolyte repletion.   -Continued aggressive wound care and optimization of nutrition.

## 2020-07-09 NOTE — H&P
[facility] Medicine  History & Physical    Patient Name: Azucena Morrison  MRN: [id number]  Admission Date: 2020  Attending Physician: No att. providers found   Primary Care Provider: Pj Sanches MD    [facility] Medicine Team: [facility] MED D Ashley Quiroz PA-C     Patient information was obtained from patient, past medical records and ER records.     Subjective:     Principal Problem:Hypokalemia due to excessive gastrointestinal loss of potassium    Chief Complaint:   Chief Complaint   Patient presents with    Post-op Problem     abd surg, mika, more infection, tx by wound care, on antibiotics        HPI: Patient is a 70 year old lady with a h/o DM II, HTN, and complicated surgical history, including h/o colon resection and Aiyana procedure, takedown colostomy, colostomy closure, clot of colo cutaneous fistula, and is currently being followed weekly with wound care.  She presents from wound care clinic for GI referral due to worsening abdominal pain, N/V.  Last BM was two days ago.  She is passing gas.  She notes decreased PO intake and worsening drainage from abdominal wound.  She was admitted to [facility] for similar complaints on 2020.  She was originally worked up for concern for SBO, but further work-up, including small bowel follow through, did not show an area of SBO.  She was discharged on Augmentin, Norco, and Bentyl.  She represented to the ER on 2020, but was discharged with unremarkable work-up and symptom improvement in the ER.    Of note, patient's primary language is German.  An  was used.    Past Medical History:   Diagnosis Date    Chronic kidney disease, stage 3     Diabetes mellitus     Diabetes mellitus, type 2     Hypertension        Past Surgical History:   Procedure Laterality Date     SECTION, CLASSIC      x2    CHOLECYSTECTOMY      COLON SURGERY      [facility]    COLONOSCOPY N/A 2018    Procedure:  COLONOSCOPY;  Surgeon: Gibran Aguayo MD;  Location: [facility] ENDO;  Service: Endoscopy;  Laterality: N/A;    COLOSTOMY Left 2015    CYSTOSCOPY WITH URETEROSCOPY, RETROGRADE PYELOGRAPHY, AND INSERTION OF STENT Left 2/22/2019    Procedure: CYSTOSCOPY, WITH RETROGRADE PYELOGRAM AND URETERAL STENT INSERTION with placement of a muir cath;  Surgeon: Ulisses Horton MD;  Location: [facility] OR;  Service: General;  Laterality: Left;    INCISION AND DRAINAGE OF ABSCESS N/A 12/22/2019    Procedure: INCISION AND DRAINAGE, ABSCESS;  Surgeon: Ulisses Horton MD;  Location: [facility] OR;  Service: General;  Laterality: N/A;    INCISION OF ABDOMINAL WALL N/A 8/10/2018    Procedure: INCISION, ABDOMINAL WALL;  Surgeon: Ulisses Horton MD;  Location: [facility] OR;  Service: General;  Laterality: N/A;    INCISIONAL HERNIA REPAIR Right 2010       Review of patient's allergies indicates:   Allergen Reactions    Chlorhexidine gluconate Rash     Chlorhexidine/alcohol wipes. Rash and blistering.       No current facility-administered medications on file prior to encounter.      Current Outpatient Medications on File Prior to Encounter   Medication Sig    acetaminophen (TYLENOL) 325 MG tablet Take 2 tablets (650 mg total) by mouth every 8 (eight) hours as needed.    amoxicillin-clavulanate 875-125mg (AUGMENTIN) 875-125 mg per tablet Take 1 tablet by mouth twice a day    betamethasone valerate 0.1% (VALISONE) 0.1 % Crea Apply around wound as needed daily for itching.    blood sugar diagnostic Strp Use to test blood sugar twice daily as directed.    blood-glucose meter Misc Use as directed to test blood sugar twice daily    carvedilol (COREG) 25 MG tablet Take 1 tablet (25 mg total) by mouth once daily.    clotrimazole-betamethasone 1-0.05% (LOTRISONE) cream Apply topically locally as directed    dicyclomine (BENTYL) 10 MG capsule TAKE 1 CAPSULE BY MOUTH THREE TIMES A DAY    doxycycline (VIBRA-TABS) 100 MG tablet Take 1 tablet  "(100 mg total) by mouth 2 (two) times daily.    gentamicin (GARAMYCIN) 0.1 % ointment Apply topically to affected area daily    hydroCHLOROthiazide (HYDRODIURIL) 25 MG tablet Take 1 tablet (25 mg total) by mouth once daily.    HYDROcodone-acetaminophen (NORCO) 5-325 mg per tablet Take 1 tablet by mouth every 4 hours. (Patient taking differently: every 4 (four) hours as needed. )    HYDROcodone-acetaminophen (NORCO) 5-325 mg per tablet Take 1 tablet by mouth every 4 (four) hours as needed.    insulin glargine 100 units/mL (3mL) SubQ pen Inject subcutaneously into the skin 10 units every morning.    lancets 30 gauge Misc Use to test blood sugar twice daily.    metFORMIN (GLUCOPHAGE) 1000 MG tablet Take 1 tablet (1,000 mg total) by mouth 2 (two) times daily.    metoclopramide HCl (REGLAN) 10 MG tablet Take 1 tablet (10 mg total) by mouth 4 (four) times daily.    naproxen (NAPROSYN) 500 MG tablet Take 1 tablet (500 mg total) by mouth 2 (two) times daily with meals.    ondansetron (ZOFRAN) 8 MG tablet Take 1 tablet (8 mg total) by mouth 2 (two) times daily as needed.    pen needle, diabetic (BD ULTRA-FINE ONEIL PEN NEEDLE) 32 gauge x 5/32" Ndle Use to inject insulin daily    TRUETEST TEST STRIPS Strp      Family History     Problem Relation (Age of Onset)    No Known Problems Father    Stroke Mother, Sister        Tobacco Use    Smoking status: Never Smoker    Smokeless tobacco: Never Used   Substance and Sexual Activity    Alcohol use: No    Drug use: No    Sexual activity: Not Currently     Review of Systems   Constitutional: Positive for appetite change. Negative for activity change, chills, fatigue, fever and unexpected weight change.   HENT: Negative for congestion, rhinorrhea, sore throat, trouble swallowing and voice change.    Eyes: Negative for visual disturbance.   Respiratory: Negative for cough, choking, chest tightness, shortness of breath and wheezing.    Cardiovascular: Negative for chest " pain, palpitations and leg swelling.   Gastrointestinal: Positive for abdominal distention, abdominal pain, constipation, nausea and vomiting. Negative for anal bleeding, blood in stool and diarrhea.   Endocrine: Negative for cold intolerance, heat intolerance, polydipsia and polyuria.   Genitourinary: Negative for dysuria, flank pain, frequency, hematuria and urgency.   Musculoskeletal: Negative for arthralgias, back pain, joint swelling and myalgias.   Skin: Negative for color change and rash.   Neurological: Negative for dizziness, seizures, syncope, facial asymmetry, speech difficulty, weakness, light-headedness, numbness and headaches.   Hematological: Negative for adenopathy. Does not bruise/bleed easily.   Psychiatric/Behavioral: Negative for agitation, confusion, hallucinations and suicidal ideas.     Objective:     Vital Signs (Most Recent):  Temp: 98.5 °F (36.9 °C) (07/08/20 1822)  Pulse: 87 (07/09/20 0117)  Resp: 18 (07/09/20 0117)  BP: 133/77 (07/09/20 0117)  SpO2: 100 % (07/09/20 0117) Vital Signs (24h Range):  Temp:  [98.5 °F (36.9 °C)] 98.5 °F (36.9 °C)  Pulse:  [87] 87  Resp:  [18] 18  SpO2:  [98 %-100 %] 100 %  BP: (133-145)/(76-77) 133/77     Weight: 70.3 kg (155 lb)  Body mass index is 25.79 kg/m².    Physical Exam  Constitutional:       General: She is not in acute distress.     Appearance: She is well-developed.   HENT:      Head: Normocephalic and atraumatic.   Eyes:      Pupils: Pupils are equal, round, and reactive to light.   Neck:      Musculoskeletal: Neck supple.      Thyroid: No thyromegaly.      Vascular: No carotid bruit.   Cardiovascular:      Rate and Rhythm: Normal rate and regular rhythm.      Heart sounds: No murmur. No gallop.    Pulmonary:      Effort: Pulmonary effort is normal. No respiratory distress.      Breath sounds: Normal breath sounds. No wheezing.   Abdominal:      General: A surgical scar is present. Bowel sounds are normal. There is no distension.      Palpations:  Abdomen is soft. There is no hepatomegaly, splenomegaly or mass.      Tenderness: There is generalized abdominal tenderness.      Comments: Midline scar, no distension.  (+) colocutaneous fistula with output.  Left abdominal stoma from previous colostomy.  See ER note for images.   Musculoskeletal: Normal range of motion.         General: No deformity.   Skin:     General: Skin is warm and dry.      Findings: No rash.   Neurological:      Mental Status: She is alert and oriented to person, place, and time.      Cranial Nerves: No cranial nerve deficit.      Sensory: No sensory deficit.           CRANIAL NERVES     CN III, IV, VI   Pupils are equal, round, and reactive to light.       Significant Labs:   CBC:   Recent Labs   Lab 07/08/20 2029   WBC 9.03   HGB 11.0*   HCT 35.9*   *     CMP:   Recent Labs   Lab 07/08/20 2029   *   K 2.8*   CL 88*   CO2 31*   *   BUN 17   CREATININE 1.2   CALCIUM 9.5   PROT 9.2*   ALBUMIN 3.6   BILITOT 0.3   ALKPHOS 83   AST 16   ALT 14   ANIONGAP 12   EGFRNONAA 46.2*     Lipase:   Recent Labs   Lab 07/08/20 2029   LIPASE 36       Significant Imaging: I have reviewed all pertinent imaging results/findings within the past 24 hours.    Assessment/Plan:     * Hypokalemia due to excessive gastrointestinal loss of potassium  Dehydration with hyponatremia    - K 2.8.  - Na 131, baseline high 130s.  - Patient received a K rider and KCl 40mEq.  - Will start NS with K.  - Likely secondary to GI losses/dehydration.  - Supportive care.  See below.    Generalized abdominal pain  Postprocedural intraabdominal abscess  Colocutaneous fistula  History of hemicolectomy    - 7/2/2020 Small bowel follow-through:  Normal small-bowel transit time with extension to colon by approximately 2-3 hours.   - 7/5/2020 Abdominal x-ray:  Postop changes.  No acute radiographic abnormality.  - 7/5/2020 CT abdomen/pelvis with contrast:  Abnormal small bowel dilatation with air-fluid levels  suggestive for at least partial small-bowel obstruction with suspected transition point seen within the midline lower abdomen.  Similar findings are seen on recent CT from 06/29/2020.  Postsurgical changes and additional stable findings.  - Admitted to [facility] 6/29/2020 - 7/3/2020:  Admitted with concern for a possible small bowel obstruction seen on CT scan.  No evidence of any intra-abdominal abscess noted on imaging.  Patient made NPO and started on IVFs.  Discharge summary reports patient was started on IV antibiotics; however do not see where any were ordered on the MAR.  Upper GI series with small-bowel follow-through did not show any area of small bowel obstruction.  Abdominal wound was noted to be healing with better drainage from fistula site.  She was discharged on PO antibiotics, Norco, and Bentyl.  She represented to the ER on 7/5/2020, but was discharged with unremarkable work-up after symptom improvement in the ER.  - 7/8/2020:  Patient seen for follow-up with Dr. Horton.  He noted increased pain and drainage, N/V.  He recommended patient report to the ER for GI referral.    - Will order abdominal x-ray.  - Will consult GI, wound care.  - 7/5/2020 wound culture grew KLEBSIELLA PNEUMONIAE, sensitive to Rocephin.  Will start.  - Continue Reglan 10mg QID, Bentyl 10mg TID.  - Supportive care.    Chronic kidney disease, stage 3  - Creatinine at baseline of 1.2, GFR 46.2.  - Avoid nephrotoxins.      Essential hypertension  - Continue Coreg 25mg daily.  - Will hold HCTZ 25mg daily.  Restart with electrolyte improvement or worsening BP.      Type 2 diabetes mellitus without complication, with long-term current use of insulin  Diabetes with skin complication    - NPO SSI.  - Last HgbA1c on 12/22/2019 was 8.1.  Will repeat.  - Hold Metformin 1000mg BID.  - Likely contributory to non-healing wound.    VTE Risk Mitigation (From admission, onward)         Ordered     IP VTE LOW RISK PATIENT  Once      07/09/20 6120      Place sequential compression device  Until discontinued      07/09/20 0126     Place SHIRLEY hose  Until discontinued      07/09/20 0126                   Ashley Quiroz PA-C  Department of Hospital Medicine   [facility]

## 2020-07-09 NOTE — ASSESSMENT & PLAN NOTE
Postprocedural intraabdominal abscess  Colocutaneous fistula  History of hemicolectomy    - 7/2/2020 Small bowel follow-through:  Normal small-bowel transit time with extension to colon by approximately 2-3 hours.   - 7/5/2020 Abdominal x-ray:  Postop changes.  No acute radiographic abnormality.  - 7/5/2020 CT abdomen/pelvis with contrast:  Abnormal small bowel dilatation with air-fluid levels suggestive for at least partial small-bowel obstruction with suspected transition point seen within the midline lower abdomen.  Similar findings are seen on recent CT from 06/29/2020.  Postsurgical changes and additional stable findings.  - Admitted to [facility] 6/29/2020 - 7/3/2020:  Admitted with concern for a possible small bowel obstruction seen on CT scan.  No evidence of any intra-abdominal abscess noted on imaging.  Patient made NPO and started on IVFs.  Discharge summary reports patient was started on IV antibiotics; however do not see where any were ordered on the MAR.  Upper GI series with small-bowel follow-through did not show any area of small bowel obstruction.  Abdominal wound was noted to be healing with better drainage from fistula site.  She was discharged on PO antibiotics, Norco, and Bentyl.  She represented to the ER on 7/5/2020, but was discharged with unremarkable work-up after symptom improvement in the ER.  - 7/8/2020:  Patient seen for follow-up with Dr. Horton.  He noted increased pain and drainage, N/V.  He recommended patient report to the ER for GI referral.    - Will order abdominal x-ray.  - Will consult GI, wound care.  - 7/5/2020 wound culture grew KLEBSIELLA PNEUMONIAE, sensitive to Rocephin.  Will start.  - Continue Reglan 10mg QID, Bentyl 10mg TID.  - Supportive care.

## 2020-07-09 NOTE — PLAN OF CARE
Patient has been accepted by [facility], who see the patient tomorrow and [facility], who will delivered the meds to the patient's home. SW will continue to follow.        07/09/20 1450   Post-Acute Status   Post-Acute Authorization Home Health;Medications   Home Health Status Set-up Complete   Medication Status Set-up Complete       Ofe Lanza LMSW   - [facility]  Ext. [phone]

## 2020-07-09 NOTE — ASSESSMENT & PLAN NOTE
- Continue Coreg 25mg daily.  - Will hold HCTZ 25mg daily.  Restart with electrolyte improvement or worsening BP.

## 2020-07-09 NOTE — CONSULTS
[facility]  Gastroenterology  Consult Note    Patient Name: Azucena Morrison  MRN: [id number]  Admission Date: 7/8/2020  Hospital Length of Stay: 0 days  Code Status: Full Code   Attending Provider: Gil Knight MD   Consulting Provider: Dejuan Wagoner MD  Primary Care Physician: Pj Sanches MD  Principal Problem:Hypokalemia due to excessive gastrointestinal loss of potassium    Inpatient consult to Gastroenterology  Consult performed by: Dejuan Wagoner MD  Consult ordered by: Ashley Quiroz PA-C        Subjective:     HPI:  Ms. Azucena Morrison is a 70 year old female for whom GI is consulted for evaluation of nausea, vomiting, and abdominal pain. She has a PMH significant for diverticulosis and T2DM and a past surgical history significant for Aiyana procedure in [place] in 2015 for management of recurrent diverticular disease (with post op-course complicated by formation of colocutaneous fistula with on-going abdominal wound and recurrent partial SBO). History obtained from speaking with patient and with assistance of Nepali .     Per chart review, she follows weekly with wound care specialist and has received surgical care from [facility] since 2016 after immigrating to the  with reversal of colostomy at that time. Her most recent colonoscopy was in 06/2018 and showed successful anastomosis of sigmoid colon without obvious evidence of fistula. In 03/2019, she underwent ex.lap with lysis of adhesions for attempted treatment of recurrent SBO and attempted bowel resection for treatment of fistula, however procedure was unsuccessful. She is noted to have recurrent issues with wound infections of her colocutaneous fistula with multiple cultures positive for ESBL E.coli and Klebsiella pneumoniae. She also reports on-going issues with purulent drainage from the site. Her most recent wound infection was in 05/2020 secondary to these organisms, for which she  is using Gentamicin ointment at the wound site since that time.     She reports onset of constant bilateral lower quadrant abdominal cramping on 06/29 that did not respond to PRN NORCO, with symptom associated with nausea, vomiting, and diarrhea. However, she denies symptom association with fevers, chills, or burning with urination. She reports history of on-going intermittent similar abdominal pain since undergoing I&D for abdominal wound infection in 12/2019, however this pain usually responds to PRN pain medication and is not associated with other symptoms. She presented to [facility] on 06/29 with CT A/P concerning for possible SBO secondary to adhesions, small bowel cutaneous fistula, and diverticulosis. She was managed with IVF and anti-emetics and underwent a small bowel follow through on 07/02 that was negative for obstruction. She was discharged on 07/03 with resolution of symptoms, however she represented to [facility] ED on 07/05 with recurrent abdominal pain, nausea, and vomiting without diarrhea. She underwent KUB that was negative for SBO or ileus and CT A/P that was concerning for partial SBO in the midline lower abdomen, however she was discharged same day following resolution of symptoms in ED with use of anti-emetics, opioids, and IVF. Her symptoms then returned on 07/07 and she was seen by her wound care specialist on 07/08 and seen to ED here for further management.     Hospital Course: On arrival, her vital signs were unremarkable on room air. Labs were significant for anemia (Hgb 9.4), hyponatremia (134), hypokalemia (3.1), normal renal function, and hypoalbuminemia (2.9). KUB was negative for obstruction and ileus. She was admitted to hospital medicine and initiated on IVF, Ceftriaxone, and electrolyte replacement. GI was consulted for assistance with management.     On initial bedside interview, patient reported resolution of symptoms and is passing gas. Her last bowel movement was on 07/06  and loose in consistency.    Past Medical History:   Diagnosis Date    Chronic kidney disease, stage 3     Diabetes mellitus     Diabetes mellitus, type 2     Hypertension        Past Surgical History:   Procedure Laterality Date     SECTION, CLASSIC      x2    CHOLECYSTECTOMY      COLON SURGERY      [facility]    COLONOSCOPY N/A 2018    Procedure: COLONOSCOPY;  Surgeon: Gibran Aguayo MD;  Location: [facility] ENDO;  Service: Endoscopy;  Laterality: N/A;    COLOSTOMY Left     CYSTOSCOPY WITH URETEROSCOPY, RETROGRADE PYELOGRAPHY, AND INSERTION OF STENT Left 2019    Procedure: CYSTOSCOPY, WITH RETROGRADE PYELOGRAM AND URETERAL STENT INSERTION with placement of a muir cath;  Surgeon: Ulisses Horton MD;  Location: [facility] OR;  Service: General;  Laterality: Left;    INCISION AND DRAINAGE OF ABSCESS N/A 2019    Procedure: INCISION AND DRAINAGE, ABSCESS;  Surgeon: Ulisses Horton MD;  Location: [facility] OR;  Service: General;  Laterality: N/A;    INCISION OF ABDOMINAL WALL N/A 8/10/2018    Procedure: INCISION, ABDOMINAL WALL;  Surgeon: Ulisses Horton MD;  Location: [facility] OR;  Service: General;  Laterality: N/A;    INCISIONAL HERNIA REPAIR Right        Review of patient's allergies indicates:   Allergen Reactions    Chlorhexidine gluconate Rash     Chlorhexidine/alcohol wipes. Rash and blistering.     Family History     Problem Relation (Age of Onset)    Alcohol abuse Father    Diabetes Sister, Brother    Early death Grandchild    Heart disease Mother, Sister    Hyperlipidemia Mother, Sister    Hypertension Mother, Sister    Stroke Mother, Sister        Tobacco Use    Smoking status: Former Smoker     Types: Cigarettes     Quit date: 2000     Years since quittin.5    Smokeless tobacco: Never Used   Substance and Sexual Activity    Alcohol use: No    Drug use: No    Sexual activity: Not Currently     Review of Systems   Constitutional: Positive for  unexpected weight change. Negative for appetite change, chills and fever.   HENT: Negative for congestion and trouble swallowing.    Eyes: Negative for pain and redness.   Respiratory: Negative for cough and shortness of breath.    Cardiovascular: Negative for chest pain, palpitations and leg swelling.   Gastrointestinal: Negative for abdominal pain, constipation, diarrhea, nausea and vomiting.   Genitourinary: Negative for difficulty urinating and dysuria.   Musculoskeletal: Negative for back pain and neck pain.   Skin: Positive for wound. Negative for rash.   Neurological: Negative for dizziness, light-headedness and headaches.     Objective:     Vital Signs (Most Recent):  Temp: 97.9 °F (36.6 °C) (07/09/20 0808)  Pulse: 68 (07/09/20 0900)  Resp: 12 (07/09/20 0900)  BP: 134/80 (07/09/20 0900)  SpO2: 98 % (07/09/20 0900) Vital Signs (24h Range):  Temp:  [97.9 °F (36.6 °C)-98.5 °F (36.9 °C)] 97.9 °F (36.6 °C)  Pulse:  [68-88] 68  Resp:  [12-18] 12  SpO2:  [96 %-100 %] 98 %  BP: (124-145)/(74-80) 134/80     Weight: 70.4 kg (155 lb 3.3 oz) (07/09/20 0750)  Body mass index is 29.33 kg/m².      Intake/Output Summary (Last 24 hours) at 7/9/2020 0946  Last data filed at 7/9/2020 0150  Gross per 24 hour   Intake 100 ml   Output --   Net 100 ml       Lines/Drains/Airways     Peripheral Intravenous Line                 Peripheral IV - Single Lumen 07/09/20 0008 20 G Right Hand less than 1 day                Physical Exam  Constitutional:       General: She is not in acute distress.     Appearance: Normal appearance. She is not ill-appearing.   Eyes:      General: No scleral icterus.     Conjunctiva/sclera: Conjunctivae normal.      Pupils: Pupils are equal, round, and reactive to light.   Cardiovascular:      Rate and Rhythm: Normal rate and regular rhythm.      Pulses: Normal pulses.      Heart sounds: Normal heart sounds.   Pulmonary:      Effort: Pulmonary effort is normal. No respiratory distress.      Breath sounds:  Normal breath sounds.   Abdominal:      General: A surgical scar is present. Bowel sounds are normal. There is no distension.      Palpations: Abdomen is soft.      Tenderness: There is no abdominal tenderness.      Hernia: No hernia is present.      Comments: Prior well-healed surgical scar in midline. There is a former colostomy site in left lower quadrant with slight purulent discharge. There is an ulceration just inferior to the periumbilical area with purulent discharge.    Musculoskeletal:         General: No swelling.   Skin:     General: Skin is warm and dry.      Findings: No bruising or rash.   Neurological:      Mental Status: She is alert and oriented to person, place, and time.       Significant Labs:  All pertinent lab results from the last 24 hours have been reviewed.    Significant Imaging:  Imaging results within the past 24 hours have been reviewed.    Assessment/Plan:     Partial obstruction of small intestine  This is a 70 year old [place] female with PMH significant for diverticulosis and well-controlled T2DM and a past surgical history significant for Aiyana procedure in [place] in 2015 for management of recurrent diverticular disease (with post op-course complicated by formation of colocutaneous fistula with on-going abdominal wound with recurrent infections secondary to E.coli and K.pneumoniae and recurrent partial SBO) who presented to [facility] on 07/08/2020 with on-going nausea, vomiting, and bilateral lower quadrant abdominal cramping. She is status post hospitalization from 06/29 to 07/03 at [facility] for management of partial SBO with similar associated symptoms. Her most recent CT A/P on 07/05 showed on-going partial SBO of midline abdomen, however without obvious obstruction or ileus on KUB on admission here. She is status post initiation of IVF, pain control, and anti-emetics with resolution of symptoms.     Our recommendations are as follows:   -Etiology of symptoms is likely  secondary to recurrent partial SBO that may be secondary to expected adhesions vs use of opioids and antispasmodics at home.  -No indication for EGD or colonoscopy now. Recommend general surgery evaluation for comment on management of recurrent SBO; may be done outpatient.   -Recommend continued management of suspected SBO with IVF, anti-emetics, and minimize opioid usage if possible.   -Discontinuation of home BENTYL.   -Continued PRN electrolyte repletion.   -Continued aggressive wound care and optimization of nutrition.         Thank you for your consult. I will sign off. Please contact us if you have any additional questions.    Dejuan Wagoner MD  Gastroenterology  [facility]

## 2020-07-09 NOTE — HPI
Azucena Morrison is a 70 year old German-speaking [facility]-American woman with hypertension, diabetes mellitus type 2, chronic kidney disease stage 3, history of cholecystectomy in 1997, history of hemicolectomy in 2015, chronic colocutaneous fistula status post small bowel and fistula resection, colostomy takedown, and colorectal anastomosis on 11/14/2016. She lives in [place], [place]. Her primary care physician is Dr. Pj Sanches. Her wound care surgeon is Dr. Ulisses Horton.    She had an abdominal wall abscess associated with her chronic fistula with culture on 5/21/2020 growing ESBL Klebsiella pneumoniae and Escherichia coli.    She was hospitalized at [facility] from 6/29/2020 to 7/3/2020 for nausea and vomiting with abdominal distension. Upper GI series with small bowel follow-through showed no small bowel obstruction. She was prescribed amoxicillin-clavulanate, hydrocodone-acetaminophen, and dicyclomine.    She was seen at [facility] Emergency [facility] on 7/5/2020 for recurrent abdominal pain and nausea. She was felt to have diabetic gastroparesis and urged to take her recently prescribed metoclopramide. Abdominal wound culture grew Klebsiella pneumoniae sensitive to ceftriaxone, cefepime, gentamicin, meropenem, and amikacin, but resistant to cefazolin, penicillins, fluoroquinolones, tetracycline, tobramycin, and trimethoprim-sulfamethoxazole.   She saw Dr. Horton in clinic on 7/8/2020. She reported increased pain and wound drainage, and persistent nausea and vomiting. She was advised to follow up with Gastroenterology and go to the emergency department. She went to [facility]. Her last bowel movement was two days prior and she was passing flatus. Labs showed hypokalemia (potassium 2.8 mmol/L). She was given intravenous and oral potassium chloride. She was admitted to Hospital Medicine Team [facility]

## 2020-07-09 NOTE — ASSESSMENT & PLAN NOTE
Diabetes with skin complication    - NPO SSI.  - Last HgbA1c on 12/22/2019 was 8.1.  Will repeat.  - Hold Metformin 1000mg BID.  - Likely contributory to non-healing wound.

## 2020-07-09 NOTE — SUBJECTIVE & OBJECTIVE
Interval History: No vomiting overnight. Understands some English. I explained that she was on an antibiotic that was not working, and will need to go home with an IV. She understood.    Review of Systems   Constitutional: Negative for chills and fever.   Respiratory: Negative for cough and shortness of breath.    Neurological: Negative for seizures.     Objective:     Vital Signs (Most Recent):  Temp: 97.9 °F (36.6 °C) (07/09/20 0808)  Pulse: 68 (07/09/20 0900)  Resp: 12 (07/09/20 0900)  BP: 134/80 (07/09/20 0900)  SpO2: 98 % (07/09/20 0900) Vital Signs (24h Range):  Temp:  [97.9 °F (36.6 °C)-98.5 °F (36.9 °C)] 97.9 °F (36.6 °C)  Pulse:  [68-88] 68  Resp:  [12-18] 12  SpO2:  [96 %-100 %] 98 %  BP: (124-145)/(74-80) 134/80     Weight: 70.4 kg (155 lb 3.3 oz)  Body mass index is 29.33 kg/m².    Intake/Output Summary (Last 24 hours) at 7/9/2020 1146  Last data filed at 7/9/2020 0150  Gross per 24 hour   Intake 100 ml   Output --   Net 100 ml      Physical Exam  Vitals signs and nursing note reviewed.   Constitutional:       General: She is not in acute distress.     Appearance: She is not toxic-appearing.   Pulmonary:      Effort: Pulmonary effort is normal. No respiratory distress.   Abdominal:      Palpations: Abdomen is soft.      Tenderness: There is no abdominal tenderness.      Comments: Left lower abdomen and midline wounds   Skin:     General: Skin is warm and dry.   Neurological:      Mental Status: She is alert.   Psychiatric:         Mood and Affect: Mood and affect normal.         Thought Content: Thought content normal.         Significant Labs: All pertinent labs within the past 24 hours have been reviewed.  Recent Labs   Lab 07/05/20  1514 07/08/20 2029 07/09/20  0424    131* 134*   K 3.2* 2.8* 3.1*   CL 97 88* 94*   CO2 26 31* 26   BUN 11 17 15   CREATININE 1.1 1.2 1.1   CALCIUM 8.9 9.5 8.6*   PROT 8.5* 9.2* 7.4   BILITOT 0.3 0.3 0.3   ALKPHOS 69 83 69   ALT 10 14 10   AST 16 16 13        Significant Imaging: I have reviewed all pertinent imaging results/findings within the past 24 hours.   X-Ray Abdomen Portable 7/09/20: FINDINGS: Bilateral hemidiaphragm and inferior aspect of the pelvis are excluded from the field of view.  Mild distention of bowel in the lower abdomen, similar to the prior radiograph and much better characterized on prior CT.  Mild stool burden noted in the colon.  Surgical clips overlie the right upper quadrant of the abdomen from prior cholecystectomy.  Bones are stable.   Impression: Stable mild distention of bowel in the lower abdomen, much better characterized on prior CT scan.  No worsening of bowel distention identified.

## 2020-07-09 NOTE — CONSULTS
Wound care consulted for abdominal fistulas   PMH:   Diverticulosis, DM2, recurrent partial SBO, colocutaneous fistula,   Reversal of colostomy,   Assessment:  The abdomen has 2 stable colocutaneous fistulas (lower midline and LLQ) draining a moderate amount of clear yellow fluid.    The patient has been using dressings to collect the drainage.   We discussed applying pouches over the fistulas to contain the drainage, protect the skin and she agreed.  She has not had good success with pouching in the past, she is willing to try.   Recommendations:  The fistulas/chauncey-wounds were cleansed with water and gauze, sensicare barrier film applied over the skin to protect from drainage.  A medium tina wound/fistula pouch was cut to fit over the midline abdominal fistula and divit, the bottom cut area was reinforced with an tina ring( cohesive) then paste was applied to the pouch opening to seal any skin folds to prevent drainage from leaking. The pouch was sealed in place with warmth.   A small tina wound/fistula pouch was cut to fit over the LLQ fistula, paste applied to the cut edges then placed over the fistula and sealed to the skin with warmth.    The patient is satisfied with the outcome.    Nursing to continue care, supplies at bedside.   Wound care will follow-up prn  Outpatient follow-up appointment with Kaye Lott,  in [place]   B. Kim Ventura RN, [facility]  [phone]  LLQ    Midline abdomen

## 2020-07-09 NOTE — ASSESSMENT & PLAN NOTE
Diabetes with skin complication  Holding home metformin. Insulin aspart sliding scale. Monitor Accuchecks.

## 2020-07-09 NOTE — PLAN OF CARE
CM met with patient for discharged planning.  Patient states she lives with her son, daughter-in-law and three grandchildren in a one story house with six steps to enter with a railing.  Patient is independent with ADL's.  Plan is to discharge home with home health and IV antibiotics.      Pharmacy:    [facility]  [address]  [place] [address]  Phone: [phone] Fax: [phone]    PCP:  Pj Sanches MD    Payor: [employer] MEDICARE / Plan: GOOM SNP (SPECIAL NEEDS PLAN) / Product Type: Medicare Advantage /      07/09/20 1304   Discharge Assessment   Assessment Type Discharge Planning Assessment   Confirmed/corrected address and phone number on facesheet? Yes   Assessment information obtained from? Patient   Expected Length of Stay (days) 2   Prior to hospitilization cognitive status: Alert/Oriented   Prior to hospitalization functional status: Independent   Current cognitive status: Alert/Oriented   Current Functional Status: Independent   Facility Arrived From: Emergency room   Lives With child(felicitas), adult;grandchild(felicitas)   Able to Return to Prior Arrangements yes   Is patient able to care for self after discharge? Unable to determine at this time (comments)   Patient's perception of discharge disposition home health   Readmission Within the Last 30 Days no previous admission in last 30 days   Patient currently being followed by outpatient case management? No   Patient currently receives any other outside agency services? No   Equipment Currently Used at Home wound care supplies   Do you have any problems affording any of your prescribed medications? No   Is the patient taking medications as prescribed? yes   Does the patient have transportation home? Yes   Transportation Anticipated family or friend will provide   Does the patient receive services at the Coumadin Clinic? No   Discharge Plan A Home Health   Discharge Plan B Home with family   DME Needed Upon Discharge  none    Patient/Family in Agreement with Plan yes

## 2020-07-09 NOTE — HOSPITAL COURSE
She was put on normal saline with potassium chloride. Ceftriaxone was started based on recent culture results. Gastroenterology was consulted and they recommended outpatient General Surgery evaluation. Her wound care surgeon has already admitted her for this recurrent problem so there is nothing new to do. A PICC was placed to continue ceftriaxone for 2 weeks for the wound infection.

## 2020-07-09 NOTE — PLAN OF CARE
SHAZIA faxed HH Orders to [facility] and [facility] via  for review. SHAZIA will continue to follow.        07/09/20 1217   Post-Acute Status   Post-Acute Authorization Home Health;Medications   Home Health Status Referrals Sent   Medication Status Pending Prior Authorization       Ofe Lanza LMSW   - [facility]  Ext. [phone]

## 2020-07-09 NOTE — ED NOTES
Pt placed on portable telemetry monitoring box.  Ability to visualize pt's rhythm confirmed with Jazz of telemetry.  NSR with a HR of 79 noted.

## 2020-07-09 NOTE — ASSESSMENT & PLAN NOTE
Dehydration with hyponatremia    - K 2.8.  - Na 131, baseline high 130s.  - Patient received a K rider and KCl 40mEq.  - Will start NS with K.  - Likely secondary to GI losses/dehydration.  - Supportive care.  See below.

## 2020-07-09 NOTE — DISCHARGE SUMMARY
[facility] Hospital Medicine  Discharge Summary      Patient Name: Azucena Morrison  MRN: [id number]  Admission Date: 7/8/2020  Hospital Length of Stay: 0 days  Discharge Date and Time: 7/9/2020  3:57 PM  Attending Physician: Gil Knight MD   Discharging Provider: Gil Knight MD  Primary Care Provider: Pj Sanches MD  Hospital Medicine Team: [facility] HOSP MED D Gil Knight MD    HPI:   Azucena Morrison is a 70 year old Malagasy-speaking [place]-American woman with hypertension, diabetes mellitus type 2, chronic kidney disease stage 3, history of cholecystectomy in 1997, history of hemicolectomy in 2015, chronic colocutaneous fistula status post small bowel and fistula resection, colostomy takedown, and colorectal anastomosis on 11/14/2016. She lives in [place], [place]. Her primary care physician is Dr. Pj Sanches. Her wound care surgeon is Dr. Ulisses Horton.    She had an abdominal wall abscess associated with her chronic fistula with culture on 5/21/2020 growing ESBL Klebsiella pneumoniae and Escherichia coli.    She was hospitalized at [facility] from 6/29/2020 to 7/3/2020 for nausea and vomiting with abdominal distension. Upper GI series with small bowel follow-through showed no small bowel obstruction. She was prescribed amoxicillin-clavulanate, hydrocodone-acetaminophen, and dicyclomine.    She was seen at [facility] Emergency Department on 7/5/2020 for recurrent abdominal pain and nausea. She was felt to have diabetic gastroparesis and urged to take her recently prescribed metoclopramide. Abdominal wound culture grew Klebsiella pneumoniae sensitive to ceftriaxone, cefepime, gentamicin, meropenem, and amikacin, but resistant to cefazolin, penicillins, fluoroquinolones, tetracycline, tobramycin, and trimethoprim-sulfamethoxazole.   She saw Dr. Horton in clinic on 7/8/2020. She reported increased pain and wound drainage, and  persistent nausea and vomiting. She was advised to follow up with Gastroenterology and go to the emergency department. She went to [facility] Emergency [facility]. Her last bowel movement was two days prior and she was passing flatus. Labs showed hypokalemia (potassium 2.8 mmol/L). She was given intravenous and oral potassium chloride. She was admitted to Hospital Medicine Team D.      Hospital Course:   She was put on normal saline with potassium chloride. Ceftriaxone was started based on recent culture results. Gastroenterology was consulted and they recommended outpatient General Surgery evaluation. Her wound care surgeon has already admitted her for this recurrent problem so there is nothing new to do. A PICC was placed to continue ceftriaxone for 2 weeks for the wound infection.      Consults:   Consults (From admission, onward)        Status Ordering Provider     Inpatient consult to Gastroenterology  Once     Provider:  (Not yet assigned)    Completed SOFI ROMERO     Inpatient consult to PICC team ([facility])  Once     Provider:  (Not yet assigned)    Completed YESY ROJAS     Inpatient consult to Social Work  Once     Provider:  (Not yet assigned)    Acknowledged YESY ROJAS        Final Active Diagnoses:    Diagnosis Date Noted POA    PRINCIPAL PROBLEM:  Hypokalemia due to excessive gastrointestinal loss of potassium [E87.6] 07/08/2020 Yes    Chronic kidney disease, stage 3 [N18.3]  Yes     Chronic    Infection due to ESBL-producing Klebsiella pneumoniae [A49.8, Z16.12] 07/05/2020 Yes    Partial obstruction of small intestine [K56.600] 01/24/2019 Yes    Colocutaneous fistula [K63.2] 12/05/2018 Yes     Chronic    Generalized abdominal pain [R10.84]  Yes     Chronic    Essential hypertension [I10] 08/02/2016 Yes     Chronic    Type 2 diabetes mellitus without complication, with long-term current use of insulin [E11.9, Z79.4] 05/17/2016 Not Applicable     Chronic     History of hemicolectomy [Z90.49] 05/17/2016 Not Applicable     Chronic      Problems Resolved During this Admission:       Discharged Condition: good    Disposition: Home-Health Care [facility]    Follow Up:  Follow-up Information     Ulisses Horton MD.    Specialties: General Surgery, Surgery  Contact information:  [address]  [address]  [place]  [phone]             Pj Sanches MD. Schedule an appointment as soon as possible for a visit in 1 week.    Specialty: Internal Medicine  Contact information:  [facility]  [place]  [phone]                 Patient Instructions:      Diet diabetic     Other restrictions (specify):   Order Comments: Do not put IV site under water (bath or pool)     Activity as tolerated       Significant Diagnostic Studies:   Recent Labs   Lab 07/05/20  1514 07/08/20  2029 07/09/20  0424    131* 134*   K 3.2* 2.8* 3.1*   CL 97 88* 94*   CO2 26 31* 26   BUN 11 17 15   CREATININE 1.1 1.2 1.1   CALCIUM 8.9 9.5 8.6*   PROT 8.5* 9.2* 7.4   BILITOT 0.3 0.3 0.3   ALKPHOS 69 83 69   ALT 10 14 10   AST 16 16 13     X-Ray Abdomen Portable 7/09/20: FINDINGS: Bilateral hemidiaphragm and inferior aspect of the pelvis are excluded from the field of view.  Mild distention of bowel in the lower abdomen, similar to the prior radiograph and much better characterized on prior CT.  Mild stool burden noted in the colon.  Surgical clips overlie the right upper quadrant of the abdomen from prior cholecystectomy.  Bones are stable.   Impression: Stable mild distention of bowel in the lower abdomen, much better characterized on prior CT scan.  No worsening of bowel distention identified.      Medications:  Reconciled Home Medications:      Medication List      START taking these medications    cefTRIAXone 1 gram injection  Commonly known as: ROCEPHIN  Inject 1 g into the vein once daily. End date 7/22/2020 for 13 days        CHANGE how you take these medications   "  * HYDROcodone-acetaminophen 5-325 mg per tablet  Commonly known as: NORCO  Take 1 tablet by mouth every 4 hours.  What changed:   · when to take this  · reasons to take this     * HYDROcodone-acetaminophen 5-325 mg per tablet  Commonly known as: NORCO  Take 1 tablet by mouth every 4 (four) hours as needed.  What changed: Another medication with the same name was changed. Make sure you understand how and when to take each.     ondansetron 8 MG tablet  Commonly known as: ZOFRAN  Take 1 tablet (8 mg total) by mouth 2 (two) times daily as needed.  What changed:   · when to take this  · reasons to take this         * This list has 2 medication(s) that are the same as other medications prescribed for you. Read the directions carefully, and ask your doctor or other care provider to review them with you.            CONTINUE taking these medications    acetaminophen 325 MG tablet  Commonly known as: TYLENOL  Take 2 tablets (650 mg total) by mouth every 8 (eight) hours as needed.     BD ULTRA-FINE ONEIL PEN NEEDLE 32 gauge x 5/32" Ndle  Generic drug: pen needle, diabetic  Use to inject insulin daily     betamethasone valerate 0.1% 0.1 % Crea  Commonly known as: VALISONE  Apply around wound as needed daily for itching.     carvediloL 25 MG tablet  Commonly known as: COREG  [place] tre tableta (25 mg en total) por vía oral diariamente.  (Take 1 tablet (25 mg total) by mouth once daily.)     clotrimazole-betamethasone 1-0.05% cream  Commonly known as: LOTRISONE  Apply topically locally as directed     dicyclomine 10 MG capsule  Commonly known as: BENTYL  TAKE 1 CAPSULE BY MOUTH THREE TIMES A DAY     EASY TOUCH TWIST LANCETS 30 gauge Misc  Generic drug: lancets  Use to test blood sugar twice daily.     gentamicin 0.1 % ointment  Commonly known as: GARAMYCIN  Aplique de manera tópica en el área afectada diariamente  (Apply topically to affected area daily)     hydroCHLOROthiazide 25 MG tablet  Commonly known as: HYDRODIURIL  [place] " tre tableta (25 mg en total) por vía oral diariamente.  (Take 1 tablet (25 mg total) by mouth once daily.)     LANTUS SOLOSTAR U-100 INSULIN glargine 100 units/mL (3mL) SubQ pen  Generic drug: insulin  Inject subcutaneously into the skin 10 units every morning.     metFORMIN 1000 MG tablet  Commonly known as: GLUCOPHAGE  Take 1 tablet (1,000 mg total) by mouth 2 (two) times daily.     metoclopramide HCl 10 MG tablet  Commonly known as: REGLAN  Take 1 tablet (10 mg total) by mouth 4 (four) times daily.     naproxen 500 MG tablet  Commonly known as: NAPROSYN  [place] tre tableta (500 mg en total) por vía oral 2 veces al día con las comidas.  (Take 1 tablet (500 mg total) by mouth 2 (two) times daily with meals.)     TRUE METRIX GLUCOSE METER Misc  Generic drug: blood-glucose meter  Use as directed to test blood sugar twice daily     * TRUETEST TEST STRIPS Strp  Generic drug: blood sugar diagnostic     * TRUE METRIX GLUCOSE TEST STRIP Strp  Generic drug: blood sugar diagnostic  Use to test blood sugar twice daily as directed.         * This list has 2 medication(s) that are the same as other medications prescribed for you. Read the directions carefully, and ask your doctor or other care provider to review them with you.            STOP taking these medications    amoxicillin-clavulanate 875-125mg 875-125 mg per tablet  Commonly known as: AUGMENTIN     doxycycline 100 MG tablet  Commonly known as: VIBRA-TABS            Indwelling Lines/Drains at time of discharge: None    Time spent on the discharge of patient: 35 minutes  Patient was seen and examined on the date of discharge and determined to be suitable for discharge.         Gil Knight MD  Department of Hospital Medicine  [facility]

## 2020-07-10 LAB
BACTERIA BLD CULT: NORMAL
BACTERIA BLD CULT: NORMAL

## 2020-07-10 NOTE — PLAN OF CARE
Patient discharged home with [facility] for IV antibiotics.    Future Appointments   Date Time Provider Department Center   7/15/2020  8:30 AM Ulisses Horton MD [facility] W[facility]        07/10/20 0732   Final Note   Assessment Type Final Discharge Note   Anticipated Discharge Disposition Home-Health   Right Care Referral Info   Post Acute Recommendation Home-care   Referral Type Home Health   Facility Name [facility]

## 2020-07-13 ENCOUNTER — HOSPITAL ENCOUNTER (INPATIENT)
Facility: HOSPITAL | Age: 70
LOS: 2 days | Discharge: ANOTHER HOSPITAL | DRG: 394 | End: 2020-07-15
Attending: EMERGENCY MEDICINE | Admitting: SURGERY
Payer: MEDICARE

## 2020-07-13 DIAGNOSIS — E11.9 TYPE 2 DIABETES MELLITUS WITHOUT COMPLICATION, WITH LONG-TERM CURRENT USE OF INSULIN: ICD-10-CM

## 2020-07-13 DIAGNOSIS — R10.84 GENERALIZED ABDOMINAL PAIN: Primary | ICD-10-CM

## 2020-07-13 DIAGNOSIS — Z90.49 HISTORY OF HEMICOLECTOMY: ICD-10-CM

## 2020-07-13 DIAGNOSIS — I10 ESSENTIAL HYPERTENSION: ICD-10-CM

## 2020-07-13 DIAGNOSIS — K63.2 FISTULA OF INTESTINE, EXCLUDING RECTUM AND ANUS: ICD-10-CM

## 2020-07-13 DIAGNOSIS — R10.9 ABDOMINAL PAIN: ICD-10-CM

## 2020-07-13 DIAGNOSIS — Z79.4 TYPE 2 DIABETES MELLITUS WITHOUT COMPLICATION, WITH LONG-TERM CURRENT USE OF INSULIN: ICD-10-CM

## 2020-07-13 DIAGNOSIS — N17.9 ACUTE KIDNEY INJURY: ICD-10-CM

## 2020-07-13 DIAGNOSIS — L98.8 CUTANEOUS FISTULA: ICD-10-CM

## 2020-07-13 DIAGNOSIS — K56.600 PARTIAL SMALL BOWEL OBSTRUCTION: ICD-10-CM

## 2020-07-13 LAB
ALBUMIN SERPL BCP-MCNC: 3.9 G/DL (ref 3.5–5.2)
ALP SERPL-CCNC: 81 U/L (ref 55–135)
ALT SERPL W/O P-5'-P-CCNC: 12 U/L (ref 10–44)
ANION GAP SERPL CALC-SCNC: 22 MMOL/L (ref 8–16)
AST SERPL-CCNC: 18 U/L (ref 10–40)
BASOPHILS # BLD AUTO: 0.03 K/UL (ref 0–0.2)
BASOPHILS NFR BLD: 0.2 % (ref 0–1.9)
BILIRUB SERPL-MCNC: 0.4 MG/DL (ref 0.1–1)
BILIRUB UR QL STRIP: ABNORMAL
BUN SERPL-MCNC: 25 MG/DL (ref 8–23)
CALCIUM SERPL-MCNC: 10.4 MG/DL (ref 8.7–10.5)
CHLORIDE SERPL-SCNC: 81 MMOL/L (ref 95–110)
CLARITY UR: CLEAR
CO2 SERPL-SCNC: 26 MMOL/L (ref 23–29)
COLOR UR: ABNORMAL
CREAT SERPL-MCNC: 2.2 MG/DL (ref 0.5–1.4)
DIFFERENTIAL METHOD: ABNORMAL
EOSINOPHIL # BLD AUTO: 0.2 K/UL (ref 0–0.5)
EOSINOPHIL NFR BLD: 1.5 % (ref 0–8)
ERYTHROCYTE [DISTWIDTH] IN BLOOD BY AUTOMATED COUNT: 16.2 % (ref 11.5–14.5)
EST. GFR  (AFRICAN AMERICAN): 26 ML/MIN/1.73 M^2
EST. GFR  (NON AFRICAN AMERICAN): 22 ML/MIN/1.73 M^2
GLUCOSE SERPL-MCNC: 144 MG/DL (ref 70–110)
GLUCOSE UR QL STRIP: NEGATIVE
HCT VFR BLD AUTO: 38.6 % (ref 37–48.5)
HGB BLD-MCNC: 12.4 G/DL (ref 12–16)
HGB UR QL STRIP: NEGATIVE
IMM GRANULOCYTES # BLD AUTO: 0.05 K/UL (ref 0–0.04)
IMM GRANULOCYTES NFR BLD AUTO: 0.4 % (ref 0–0.5)
KETONES UR QL STRIP: ABNORMAL
LEUKOCYTE ESTERASE UR QL STRIP: NEGATIVE
LIPASE SERPL-CCNC: 66 U/L (ref 4–60)
LYMPHOCYTES # BLD AUTO: 2.2 K/UL (ref 1–4.8)
LYMPHOCYTES NFR BLD: 18.2 % (ref 18–48)
MCH RBC QN AUTO: 22.8 PG (ref 27–31)
MCHC RBC AUTO-ENTMCNC: 32.1 G/DL (ref 32–36)
MCV RBC AUTO: 71 FL (ref 82–98)
MONOCYTES # BLD AUTO: 0.8 K/UL (ref 0.3–1)
MONOCYTES NFR BLD: 6.6 % (ref 4–15)
NEUTROPHILS # BLD AUTO: 9 K/UL (ref 1.8–7.7)
NEUTROPHILS NFR BLD: 73.1 % (ref 38–73)
NITRITE UR QL STRIP: NEGATIVE
NRBC BLD-RTO: 0 /100 WBC
PH UR STRIP: 5 [PH] (ref 5–8)
PLATELET # BLD AUTO: 651 K/UL (ref 150–350)
PLATELET BLD QL SMEAR: ABNORMAL
PMV BLD AUTO: 7.9 FL (ref 9.2–12.9)
POCT GLUCOSE: 146 MG/DL (ref 70–110)
POTASSIUM SERPL-SCNC: 3.2 MMOL/L (ref 3.5–5.1)
PROT SERPL-MCNC: 9.8 G/DL (ref 6–8.4)
PROT UR QL STRIP: ABNORMAL
RBC # BLD AUTO: 5.45 M/UL (ref 4–5.4)
SARS-COV-2 RDRP RESP QL NAA+PROBE: NEGATIVE
SODIUM SERPL-SCNC: 129 MMOL/L (ref 136–145)
SP GR UR STRIP: >=1.03 (ref 1–1.03)
URN SPEC COLLECT METH UR: ABNORMAL
UROBILINOGEN UR STRIP-ACNC: NEGATIVE EU/DL
WBC # BLD AUTO: 12.33 K/UL (ref 3.9–12.7)

## 2020-07-13 PROCEDURE — 99291 CRITICAL CARE FIRST HOUR: CPT | Mod: 25

## 2020-07-13 PROCEDURE — 93010 ELECTROCARDIOGRAM REPORT: CPT | Mod: ,,, | Performed by: INTERNAL MEDICINE

## 2020-07-13 PROCEDURE — 96374 THER/PROPH/DIAG INJ IV PUSH: CPT

## 2020-07-13 PROCEDURE — 96376 TX/PRO/DX INJ SAME DRUG ADON: CPT

## 2020-07-13 PROCEDURE — 81003 URINALYSIS AUTO W/O SCOPE: CPT

## 2020-07-13 PROCEDURE — 63600175 PHARM REV CODE 636 W HCPCS: Performed by: EMERGENCY MEDICINE

## 2020-07-13 PROCEDURE — 85025 COMPLETE CBC W/AUTO DIFF WBC: CPT

## 2020-07-13 PROCEDURE — 83690 ASSAY OF LIPASE: CPT

## 2020-07-13 PROCEDURE — 93010 EKG 12-LEAD: ICD-10-PCS | Mod: ,,, | Performed by: INTERNAL MEDICINE

## 2020-07-13 PROCEDURE — 93005 ELECTROCARDIOGRAM TRACING: CPT

## 2020-07-13 PROCEDURE — 96375 TX/PRO/DX INJ NEW DRUG ADDON: CPT

## 2020-07-13 PROCEDURE — U0002 COVID-19 LAB TEST NON-CDC: HCPCS

## 2020-07-13 PROCEDURE — 12000002 HC ACUTE/MED SURGE SEMI-PRIVATE ROOM

## 2020-07-13 PROCEDURE — 96361 HYDRATE IV INFUSION ADD-ON: CPT

## 2020-07-13 PROCEDURE — 99292 CRITICAL CARE ADDL 30 MIN: CPT

## 2020-07-13 PROCEDURE — 25000003 PHARM REV CODE 250: Performed by: EMERGENCY MEDICINE

## 2020-07-13 PROCEDURE — 80053 COMPREHEN METABOLIC PANEL: CPT

## 2020-07-13 PROCEDURE — 82962 GLUCOSE BLOOD TEST: CPT

## 2020-07-13 RX ORDER — ONDANSETRON 2 MG/ML
4 INJECTION INTRAMUSCULAR; INTRAVENOUS
Status: COMPLETED | OUTPATIENT
Start: 2020-07-13 | End: 2020-07-13

## 2020-07-13 RX ORDER — MORPHINE SULFATE 4 MG/ML
4 INJECTION, SOLUTION INTRAMUSCULAR; INTRAVENOUS
Status: COMPLETED | OUTPATIENT
Start: 2020-07-13 | End: 2020-07-13

## 2020-07-13 RX ORDER — METOCLOPRAMIDE HYDROCHLORIDE 5 MG/ML
10 INJECTION INTRAMUSCULAR; INTRAVENOUS
Status: COMPLETED | OUTPATIENT
Start: 2020-07-13 | End: 2020-07-13

## 2020-07-13 RX ORDER — SODIUM CHLORIDE, SODIUM LACTATE, POTASSIUM CHLORIDE, CALCIUM CHLORIDE 600; 310; 30; 20 MG/100ML; MG/100ML; MG/100ML; MG/100ML
1000 INJECTION, SOLUTION INTRAVENOUS
Status: COMPLETED | OUTPATIENT
Start: 2020-07-13 | End: 2020-07-14

## 2020-07-13 RX ORDER — PROCHLORPERAZINE EDISYLATE 5 MG/ML
10 INJECTION INTRAMUSCULAR; INTRAVENOUS
Status: COMPLETED | OUTPATIENT
Start: 2020-07-13 | End: 2020-07-13

## 2020-07-13 RX ADMIN — PROCHLORPERAZINE EDISYLATE 10 MG: 5 INJECTION INTRAMUSCULAR; INTRAVENOUS at 06:07

## 2020-07-13 RX ADMIN — SODIUM CHLORIDE, SODIUM LACTATE, POTASSIUM CHLORIDE, AND CALCIUM CHLORIDE 1000 ML: .6; .31; .03; .02 INJECTION, SOLUTION INTRAVENOUS at 09:07

## 2020-07-13 RX ADMIN — MORPHINE SULFATE 4 MG: 4 INJECTION INTRAVENOUS at 06:07

## 2020-07-13 RX ADMIN — ONDANSETRON 4 MG: 2 INJECTION INTRAMUSCULAR; INTRAVENOUS at 06:07

## 2020-07-13 RX ADMIN — SODIUM CHLORIDE 1000 ML: 0.9 INJECTION, SOLUTION INTRAVENOUS at 06:07

## 2020-07-13 RX ADMIN — SODIUM CHLORIDE, SODIUM LACTATE, POTASSIUM CHLORIDE, AND CALCIUM CHLORIDE 1000 ML: .6; .31; .03; .02 INJECTION, SOLUTION INTRAVENOUS at 04:07

## 2020-07-13 RX ADMIN — MORPHINE SULFATE 4 MG: 4 INJECTION INTRAVENOUS at 10:07

## 2020-07-13 RX ADMIN — METOCLOPRAMIDE 10 MG: 5 INJECTION, SOLUTION INTRAMUSCULAR; INTRAVENOUS at 10:07

## 2020-07-13 NOTE — ED NOTES
"Transfer center notified dr becker that pt will be await bed at [facility] for " a long time" dr becker [facility]   "

## 2020-07-13 NOTE — ED NOTES
Pt's daughter requests update on plan of care. Would like to be contacted at [phone]. Set up for automatic text updates at this time

## 2020-07-13 NOTE — ED NOTES
Pt aware of plan of care to be transferred to [facility], pt spoke to her family on phone and transfer consent signed

## 2020-07-13 NOTE — ED NOTES
69 Y/O female arrived ambulance Pt reports recent episodes of abdominal pain  Pt has the following complaint x 5 days. Onset was  gradual, while pt was riding /driving car or truck. Also, Pt c/o of associated symptoms of  vomiting. Pt describes the complaint as throbbing that has not been relieved with rest. .Pt rates the discomfort 8 /10. That it does not radiate..  Pt denies any vomiting. Nothing has made the condition better or worse, although the patient states they has been resting since it began. Pt indicates any intervention to relieved the symptoms, pain meds unknown brand or dose..       Pt admits to+LOC. Pt denies complaints dyspnea, productive cough and wheezing, pain, dizziness, weakness, nausea, vomiting, or diarrhea. Pt denies fatigue, chain in appetite fevers or chills. Pt denies no changes in vision, smell, hearing, taste, or change is speech, focal motor or sensory loss, seizures, confusion or gait/coordination difficulty. Decrease exercise tolerance, no orthopnea, or palpitpitations.     Patient complains of abdominal pain. The pain is described as aching, and is 8/10 in intensity. Pain is located in the LLQ without radiation. Onset was 5 days ago. Symptoms have been gradually worsening since. Aggravating factors: movement.  Alleviating factors: none. Associated symptoms: vomiting. The patient denies diarrhea.

## 2020-07-13 NOTE — ED NOTES
Report received from MARIANA Sims   Assumed care of patient at this time.   Pt resting on stretcher, respiration even unlabored, side rails raised x2. NADN

## 2020-07-13 NOTE — ED NOTES
Spoke to transfer center states they will get colon/rectal on phone for recommendations of medical management of pt [facility]

## 2020-07-13 NOTE — HPI
Ms. Morrison is a 71 yo F Persian-speaking only F with PMH of HTN, DMII ( HbA1c 6.7), Chronic kidney disease stage 3 ( Cr 1.1), history of hemicolectomy in 2015 c/b chronic colocutaneous fistula status post small bowel and fistula resection, colostomy takedown, and colorectal anastomosis on 11/14/2016 by Dr. Horton c/b chronic enterocutaneous fistula who was transferred here for CRS consultation.         Complex PSH:     1997: Lap Cholecystectomy.  2010: Open Incisional hernia repair (no mesh, RUQ port site) - Dr. Romeo.   6/2015: Open [facility] in [place] for presumed diverticulitis --> c/b chronic midline wound drainage.   Saw  first time on 5/2016 for persistent midline wound drainage.     [facility] history:     8/2/2016: Wound exploration (removal of scar tissue and closure of fascial defect) - Dr. Horton --> readmitted on POD#1 with ECF.   11/14/2016: Ex lap, SBR, ECF takedown, Takedown of end colostomy with primary colorectal anastomosis. --> Post op complicated by ileus with prolonged hospital stay and wound infection requiring wound care and several I&D done in clinic.   7/11/2017: I&D and excision and drainage of abdominal wall infection and wound --> c/b recurrent ECF seen in 9/6/2017: CT scan.   9/20/2017: Fistulogram done and shows small bowel fistula.   9/2017-8/2018 --> Patient treated with prolonged IV antibiotics and wound care.   6/4/2018: Colonoscopy - Colocolonic end to end anastomosis found, 1 small polyp at the anastomosis (bx - benign) and no fistula found after injection of die through fistula.   8/6/2018: Barium enema showing Complex colocutaneous fistula involving the sigmoid colon and regional small bowel loops.  8/10/2018: OR for I&D of abdominal wall abscess and wound debridement - Cultures growing ESBL E coli.   Chronic low output drainage from [facility] treated with chronic IV antibiotics.   2/22/2019: Ex lap, VERONIKA and aborted [facility] takedown due to frozen abdomen.    12/22/2019: I&D of abdominal wall abscess.

## 2020-07-13 NOTE — ED NOTES
Pt aware of plan of care to be admitted, pt provided with pillow and additional blanket per request

## 2020-07-13 NOTE — ED PROVIDER NOTES
Encounter Date: 2020    SCRIBE #1 NOTE: I, Avila Cadena, am scribing for, and in the presence of, Felix Cordova MD.       History     Chief Complaint   Patient presents with    Abdominal Pain     Patient presents to the ED with reports of having abdominal pain with nausea and vomiting. States having a hx of multiple abdominal surgerys, patient of Dr. Horton. States having been admitted last week for similar symptoms.      71 y/o F recently discharged from [facility] returns to the ER for generalized abdominal pain and N/V. States onset was 5 days ago. Notes gradually progressive symptoms c/w her gastroparesis. Notes decreased ability to tolerate PO the last few days, but was able to tolerate some fluids. However, this morning she was unable to tolerate her reglan, which usually helps with her symptoms. Notes a diffuse cramping and aching abdominal pain that has been constant today, without exacerbating or alleviating factors. Has a PICC to her RUE for rocephin for a chronic abdominal wound/fistula. No increased drainage to that site per patient. No other symptoms reported at this time.      The history is provided by the patient. The history is limited by a language barrier. A  was used.     Review of patient's allergies indicates:   Allergen Reactions    Chlorhexidine gluconate Rash     Chlorhexidine/alcohol wipes. Rash and blistering.     Past Medical History:   Diagnosis Date    Chronic kidney disease, stage 3     Diabetes mellitus     Diabetes mellitus, type 2     Hypertension      Past Surgical History:   Procedure Laterality Date     SECTION, CLASSIC      x2    CHOLECYSTECTOMY      COLON SURGERY      hospitals    COLONOSCOPY N/A 2018    Procedure: COLONOSCOPY;  Surgeon: Gibran Aguayo MD;  Location: [facility];  Service: Endoscopy;  Laterality: N/A;    COLOSTOMY Left     CYSTOSCOPY WITH URETEROSCOPY, RETROGRADE PYELOGRAPHY, AND INSERTION OF STENT  Left 2019    Procedure: CYSTOSCOPY, WITH RETROGRADE PYELOGRAM AND URETERAL STENT INSERTION with placement of a muir cath;  Surgeon: Ulisses Horton MD;  Location: [facility] OR;  Service: General;  Laterality: Left;    INCISION AND DRAINAGE OF ABSCESS N/A 2019    Procedure: INCISION AND DRAINAGE, ABSCESS;  Surgeon: Ulisses Horton MD;  Location: [facility] OR;  Service: General;  Laterality: N/A;    INCISION OF ABDOMINAL WALL N/A 8/10/2018    Procedure: INCISION, ABDOMINAL WALL;  Surgeon: Ulisses Horton MD;  Location: [facility] OR;  Service: General;  Laterality: N/A;    INCISIONAL HERNIA REPAIR Right 2010     Family History   Problem Relation Age of Onset    Stroke Mother     Heart disease Mother     Hyperlipidemia Mother     Hypertension Mother     Alcohol abuse Father     Stroke Sister     Diabetes Sister     Heart disease Sister     Hyperlipidemia Sister     Hypertension Sister     Diabetes Brother     Early death Grandchild      Social History     Tobacco Use    Smoking status: Former Smoker     Types: Cigarettes     Quit date: 2000     Years since quittin.5    Smokeless tobacco: Never Used   Substance Use Topics    Alcohol use: No    Drug use: No     Review of Systems   Constitutional: Positive for fatigue. Negative for chills and fever.   HENT: Negative for congestion.    Respiratory: Negative for cough and shortness of breath.    Cardiovascular: Negative for chest pain.   Gastrointestinal: Positive for abdominal pain, nausea and vomiting. Negative for diarrhea.   Musculoskeletal: Negative for back pain, neck pain and neck stiffness.   Neurological: Negative for light-headedness, numbness and headaches.   All other systems reviewed and are negative.      Physical Exam     Initial Vitals [20 0618]   BP Pulse Resp Temp SpO2   (!) 103/58 107 20 98.1 °F (36.7 °C) 97 %      MAP       --         Physical Exam    Nursing note and vitals reviewed.  Constitutional: She  appears well-developed and well-nourished. No distress.   HENT:   Head: Normocephalic and atraumatic.   Eyes: Conjunctivae and EOM are normal. Pupils are equal, round, and reactive to light.   Neck: Normal range of motion. Neck supple.   Cardiovascular: Normal rate and intact distal pulses.   Pulmonary/Chest: No respiratory distress.   Abdominal: Soft. Bowel sounds are normal. She exhibits no distension. There is abdominal tenderness (Diffuse). There is guarding (Voluntary). There is no rebound.   Diffuse abdominal tenderness.  No rebound   Musculoskeletal: Normal range of motion. No tenderness or edema.   Neurological: She is alert and oriented to person, place, and time. She has normal strength. No cranial nerve deficit.   Skin: Skin is warm and dry.         ED Course   Critical Care    Date/Time: 7/13/2020 9:17 AM  Performed by: Felix Cordova MD  Authorized by: Felix Cordova MD   Direct patient critical care time: 15 minutes  Additional history critical care time: 15 minutes  Ordering / reviewing critical care time: 15 minutes  Documentation critical care time: 15 minutes  Consulting other physicians critical care time: 15 minutes  Consult with family critical care time: 10 minutes  Total critical care time (exclusive of procedural time) : 85 minutes  Critical care time was exclusive of separately billable procedures and treating other patients.  Critical care was necessary to treat or prevent imminent or life-threatening deterioration of the following conditions: dehydration and renal failure.  Critical care was time spent personally by me on the following activities: discussions with consultants, evaluation of patient's response to treatment, obtaining history from patient or surrogate, ordering and review of laboratory studies, pulse oximetry, review of old charts, re-evaluation of patient's condition, ordering and performing treatments and interventions, ordering and review of radiographic  studies, examination of patient, development of treatment plan with patient or surrogate and interpretation of cardiac output measurements.        Labs Reviewed   POCT GLUCOSE - Abnormal; Notable for the following components:       Result Value    POCT Glucose 146 (*)     All other components within normal limits   CBC W/ AUTO DIFFERENTIAL   COMPREHENSIVE METABOLIC PANEL   URINALYSIS   LIPASE   POCT GLUCOSE MONITORING CONTINUOUS     EKG Readings: (Independently Interpreted)   Initial Reading: No STEMI. Previous EKG: Compared with most recent EKG Previous EKG Date: 7/9/2020 (Minimal change). Rhythm: Normal Sinus Rhythm. Heart Rate: 94. Ectopy: No Ectopy. Conduction: LVH. ST Segments: Normal ST Segments. Axis: Normal.         X-Rays:   Independently Interpreted Readings:   Other Readings:  I have visualized all imaging for this patient, radiology has done the interpretation.    Imaging Results           CT Abdomen Pelvis  Without Contrast (Final result)  Result time 07/13/20 09:01:33    Final result by Juan Carlos Arana MD (07/13/20 09:01:33)                 Impression:      Redemonstration of abnormal small bowel dilatation with air-fluid level concerning for partial small bowel obstruction, again with suspected transition in the midline lower abdomen at site of clustering and possibly related to adhesion.    Additional findings as above.    This report was flagged in Epic as abnormal.      Electronically signed by: Juan Carlos Arana  Date:    07/13/2020  Time:    09:01             Narrative:    EXAMINATION:  CT ABDOMEN PELVIS WITHOUT CONTRAST    CLINICAL HISTORY:  LLQ abdominal pain, diverticulitis suspected;    TECHNIQUE:  Low dose axial images, sagittal and coronal reformations were obtained from the lung bases to the pubic symphysis.  30 mL of oral Omnipaque 350 was administered.    COMPARISON:  CT abdomen pelvis with contrast dated 07/05/2020    FINDINGS:  Limited images through the lower chest demonstrate  similar right lower lobe 4 mm nodule (series 2, image 17).  There are also two right perifissural nodules, for reference 3 mm (series 601, image 37).  Bibasilar atelectasis.  Coronary calcific atherosclerosis.    Abdomen and pelvis:    The liver, spleen, pancreas, and adrenal glands demonstrate normal noncontrast appearance.  Gallbladder surgically absent.  No hydronephrosis.  Similar low-density left renal cortical lesions, some too small for definitive characterization though probable cysts.  Urinary bladder is unremarkable.  Uterus is present.    Similar postsurgical changes of the bowel.  Redemonstration of several dilated loops of small bowel with air-fluid level.  Area of clustering in the midline lower abdomen with suspected site of transition near this region.  Prior left ostomy site again noted.  Similar degree of stranding in the left lower quadrant as well as periumbilical soft tissue thickening, potentially inflammatory versus postprocedural in nature.  Closely traversing bowel loops subjacent to the periumbilical region again noted.  Few scattered colonic diverticula.    No adenopathy.  Multifocal abdominal aortic atherosclerosis.  Prior midline laparotomy.  Similar ventral hernia containing portion of nonobstructed bowel.  No aggressive osseous lesion.                               X-Ray Chest AP Portable (Final result)  Result time 07/13/20 07:14:41    Final result by Pal Chavez MD (07/13/20 07:14:41)                 Impression:      No convincing radiographic evidence of acute intrathoracic process on this single view.      Electronically signed by: Pal Chavez MD  Date:    07/13/2020  Time:    07:14             Narrative:    EXAMINATION:  XR CHEST AP PORTABLE    CLINICAL HISTORY:  Abdominal pain;    TECHNIQUE:  Single frontal view of the chest was performed.    COMPARISON:  02/27/2019    FINDINGS:  Cardiac monitoring leads overlie the chest.  Cardiomediastinal silhouette does not appear  significantly enlarged.  There is atherosclerotic calcification of the thoracic aorta.  Lungs appear symmetrically expanded with mild coarse interstitial attenuation bilaterally.  No large confluent airspace consolidation or significant volume of pleural fluid appreciated.  No evidence of pneumothorax.  Osseous structures demonstrate degenerative changes.                                Medical Decision Making:   Initial Assessment:   70 year-old F presents to the ED due to abdominal pain that started 5 days ago but worse today.  Differential Diagnosis:   Diverticulitis, cholecystitis, pancreatitis, appendicitis, obstruction, constipation UTI, pyelonephritis, kidney stone, gastroenteritis  Independently Interpreted Test(s):   I have ordered and independently interpreted X-rays - see prior notes.  I have ordered and independently interpreted EKG Reading(s) - see prior notes  Clinical Tests:   Lab Tests: Reviewed       <> Summary of Lab: Concerning for acute kidney injury, leukocytosis  ED Management:  Discussed with Dr. Horton, who requests we attempt transfer to [facility] for colorectal surgery.  States he knows this patient well and has operated on her several times, but the complexity of the patient's history requires specialist consideration in his opinion.     Dr. Horton has discussed the case with Dr. Stafford at [facility], who accepts the patient for transfer. Patient comfortable with plan at this time.                                  Clinical Impression:       ICD-10-CM ICD-9-CM   1. Generalized abdominal pain  R10.84 789.07   2. Abdominal pain  R10.9 789.00   3. Partial small bowel obstruction  K56.600 560.9   4. Acute kidney injury  N17.9 584.9           Disposition:   Disposition: Transferred  Condition: Fair           I, Dr. Felix Cordova, personally performed the services described in this documentation. All medical record entries made by the scribe were at my direction and in my presence.  I have  reviewed the chart and agree that the record reflects my personal performance and is accurate and complete. Felix Cordova MD.  12:18 PM 07/13/2020                 Felix Cordova MD  07/13/20 [id number]

## 2020-07-14 LAB
ALBUMIN SERPL BCP-MCNC: 2.9 G/DL (ref 3.5–5.2)
ALP SERPL-CCNC: 64 U/L (ref 55–135)
ALT SERPL W/O P-5'-P-CCNC: 11 U/L (ref 10–44)
ANION GAP SERPL CALC-SCNC: 13 MMOL/L (ref 8–16)
ANION GAP SERPL CALC-SCNC: 14 MMOL/L (ref 8–16)
AST SERPL-CCNC: 17 U/L (ref 10–40)
BASOPHILS # BLD AUTO: 0.04 K/UL (ref 0–0.2)
BASOPHILS # BLD AUTO: 0.04 K/UL (ref 0–0.2)
BASOPHILS NFR BLD: 0.5 % (ref 0–1.9)
BASOPHILS NFR BLD: 0.6 % (ref 0–1.9)
BILIRUB SERPL-MCNC: 0.3 MG/DL (ref 0.1–1)
BUN SERPL-MCNC: 19 MG/DL (ref 8–23)
BUN SERPL-MCNC: 20 MG/DL (ref 8–23)
CALCIUM SERPL-MCNC: 8.8 MG/DL (ref 8.7–10.5)
CALCIUM SERPL-MCNC: 9.2 MG/DL (ref 8.7–10.5)
CHLORIDE SERPL-SCNC: 91 MMOL/L (ref 95–110)
CHLORIDE SERPL-SCNC: 95 MMOL/L (ref 95–110)
CO2 SERPL-SCNC: 26 MMOL/L (ref 23–29)
CO2 SERPL-SCNC: 28 MMOL/L (ref 23–29)
CREAT SERPL-MCNC: 1 MG/DL (ref 0.5–1.4)
CREAT SERPL-MCNC: 1 MG/DL (ref 0.5–1.4)
DIFFERENTIAL METHOD: ABNORMAL
DIFFERENTIAL METHOD: ABNORMAL
EOSINOPHIL # BLD AUTO: 0.2 K/UL (ref 0–0.5)
EOSINOPHIL # BLD AUTO: 0.2 K/UL (ref 0–0.5)
EOSINOPHIL NFR BLD: 2.1 % (ref 0–8)
EOSINOPHIL NFR BLD: 2.4 % (ref 0–8)
ERYTHROCYTE [DISTWIDTH] IN BLOOD BY AUTOMATED COUNT: 16.1 % (ref 11.5–14.5)
ERYTHROCYTE [DISTWIDTH] IN BLOOD BY AUTOMATED COUNT: 16.2 % (ref 11.5–14.5)
EST. GFR  (AFRICAN AMERICAN): >60 ML/MIN/1.73 M^2
EST. GFR  (AFRICAN AMERICAN): >60 ML/MIN/1.73 M^2
EST. GFR  (NON AFRICAN AMERICAN): 58 ML/MIN/1.73 M^2
EST. GFR  (NON AFRICAN AMERICAN): 58 ML/MIN/1.73 M^2
GLUCOSE SERPL-MCNC: 115 MG/DL (ref 70–110)
GLUCOSE SERPL-MCNC: 67 MG/DL (ref 70–110)
HCT VFR BLD AUTO: 32.1 % (ref 37–48.5)
HCT VFR BLD AUTO: 33.2 % (ref 37–48.5)
HGB BLD-MCNC: 10.1 G/DL (ref 12–16)
HGB BLD-MCNC: 10.4 G/DL (ref 12–16)
IMM GRANULOCYTES # BLD AUTO: 0.02 K/UL (ref 0–0.04)
IMM GRANULOCYTES # BLD AUTO: 0.03 K/UL (ref 0–0.04)
IMM GRANULOCYTES NFR BLD AUTO: 0.3 % (ref 0–0.5)
IMM GRANULOCYTES NFR BLD AUTO: 0.4 % (ref 0–0.5)
INR PPP: 1 (ref 0.8–1.2)
LYMPHOCYTES # BLD AUTO: 1.5 K/UL (ref 1–4.8)
LYMPHOCYTES # BLD AUTO: 2.3 K/UL (ref 1–4.8)
LYMPHOCYTES NFR BLD: 24 % (ref 18–48)
LYMPHOCYTES NFR BLD: 28.4 % (ref 18–48)
MCH RBC QN AUTO: 23.2 PG (ref 27–31)
MCH RBC QN AUTO: 23.3 PG (ref 27–31)
MCHC RBC AUTO-ENTMCNC: 31.3 G/DL (ref 32–36)
MCHC RBC AUTO-ENTMCNC: 31.5 G/DL (ref 32–36)
MCV RBC AUTO: 74 FL (ref 82–98)
MCV RBC AUTO: 74 FL (ref 82–98)
MONOCYTES # BLD AUTO: 0.4 K/UL (ref 0.3–1)
MONOCYTES # BLD AUTO: 0.7 K/UL (ref 0.3–1)
MONOCYTES NFR BLD: 6.6 % (ref 4–15)
MONOCYTES NFR BLD: 9.1 % (ref 4–15)
NEUTROPHILS # BLD AUTO: 4.2 K/UL (ref 1.8–7.7)
NEUTROPHILS # BLD AUTO: 4.8 K/UL (ref 1.8–7.7)
NEUTROPHILS NFR BLD: 59.5 % (ref 38–73)
NEUTROPHILS NFR BLD: 66.1 % (ref 38–73)
NRBC BLD-RTO: 0 /100 WBC
NRBC BLD-RTO: 0 /100 WBC
PLATELET # BLD AUTO: 455 K/UL (ref 150–350)
PLATELET # BLD AUTO: 550 K/UL (ref 150–350)
PMV BLD AUTO: 7.9 FL (ref 9.2–12.9)
PMV BLD AUTO: 8.4 FL (ref 9.2–12.9)
POCT GLUCOSE: 126 MG/DL (ref 70–110)
POTASSIUM SERPL-SCNC: 3 MMOL/L (ref 3.5–5.1)
POTASSIUM SERPL-SCNC: 3.3 MMOL/L (ref 3.5–5.1)
PROT SERPL-MCNC: 7.4 G/DL (ref 6–8.4)
PROTHROMBIN TIME: 10.9 SEC (ref 9–12.5)
RBC # BLD AUTO: 4.33 M/UL (ref 4–5.4)
RBC # BLD AUTO: 4.49 M/UL (ref 4–5.4)
SODIUM SERPL-SCNC: 133 MMOL/L (ref 136–145)
SODIUM SERPL-SCNC: 134 MMOL/L (ref 136–145)
WBC # BLD AUTO: 6.33 K/UL (ref 3.9–12.7)
WBC # BLD AUTO: 8 K/UL (ref 3.9–12.7)

## 2020-07-14 PROCEDURE — 25000003 PHARM REV CODE 250: Performed by: SURGERY

## 2020-07-14 PROCEDURE — 36415 COLL VENOUS BLD VENIPUNCTURE: CPT

## 2020-07-14 PROCEDURE — 80048 BASIC METABOLIC PNL TOTAL CA: CPT

## 2020-07-14 PROCEDURE — 94761 N-INVAS EAR/PLS OXIMETRY MLT: CPT

## 2020-07-14 PROCEDURE — 63600175 PHARM REV CODE 636 W HCPCS: Performed by: EMERGENCY MEDICINE

## 2020-07-14 PROCEDURE — 63600175 PHARM REV CODE 636 W HCPCS: Performed by: SURGERY

## 2020-07-14 PROCEDURE — 93005 ELECTROCARDIOGRAM TRACING: CPT

## 2020-07-14 PROCEDURE — 93010 ELECTROCARDIOGRAM REPORT: CPT | Mod: ,,, | Performed by: INTERNAL MEDICINE

## 2020-07-14 PROCEDURE — 11000001 HC ACUTE MED/SURG PRIVATE ROOM

## 2020-07-14 PROCEDURE — S5010 5% DEXTROSE AND 0.45% SALINE: HCPCS | Performed by: SURGERY

## 2020-07-14 PROCEDURE — 80053 COMPREHEN METABOLIC PANEL: CPT

## 2020-07-14 PROCEDURE — 85025 COMPLETE CBC W/AUTO DIFF WBC: CPT

## 2020-07-14 PROCEDURE — 85610 PROTHROMBIN TIME: CPT

## 2020-07-14 PROCEDURE — 93010 EKG 12-LEAD: ICD-10-PCS | Mod: ,,, | Performed by: INTERNAL MEDICINE

## 2020-07-14 RX ORDER — DEXTROSE MONOHYDRATE AND SODIUM CHLORIDE 5; .45 G/100ML; G/100ML
INJECTION, SOLUTION INTRAVENOUS CONTINUOUS
Status: DISCONTINUED | OUTPATIENT
Start: 2020-07-14 | End: 2020-07-15 | Stop reason: HOSPADM

## 2020-07-14 RX ORDER — SODIUM CHLORIDE 0.9 % (FLUSH) 0.9 %
10 SYRINGE (ML) INJECTION
Status: DISCONTINUED | OUTPATIENT
Start: 2020-07-14 | End: 2020-07-15 | Stop reason: HOSPADM

## 2020-07-14 RX ORDER — SODIUM CHLORIDE, SODIUM LACTATE, POTASSIUM CHLORIDE, CALCIUM CHLORIDE 600; 310; 30; 20 MG/100ML; MG/100ML; MG/100ML; MG/100ML
1000 INJECTION, SOLUTION INTRAVENOUS
Status: COMPLETED | OUTPATIENT
Start: 2020-07-14 | End: 2020-07-14

## 2020-07-14 RX ORDER — LIDOCAINE HYDROCHLORIDE 10 MG/ML
1 INJECTION, SOLUTION EPIDURAL; INFILTRATION; INTRACAUDAL; PERINEURAL ONCE
Status: DISCONTINUED | OUTPATIENT
Start: 2020-07-14 | End: 2020-07-15 | Stop reason: HOSPADM

## 2020-07-14 RX ORDER — GENTAMICIN SULFATE 1 MG/G
OINTMENT TOPICAL DAILY
Status: DISCONTINUED | OUTPATIENT
Start: 2020-07-14 | End: 2020-07-15 | Stop reason: HOSPADM

## 2020-07-14 RX ORDER — MORPHINE SULFATE 4 MG/ML
4 INJECTION, SOLUTION INTRAMUSCULAR; INTRAVENOUS
Status: COMPLETED | OUTPATIENT
Start: 2020-07-14 | End: 2020-07-14

## 2020-07-14 RX ORDER — HYDROCODONE BITARTRATE AND ACETAMINOPHEN 5; 325 MG/1; MG/1
1 TABLET ORAL EVERY 4 HOURS PRN
Status: DISCONTINUED | OUTPATIENT
Start: 2020-07-14 | End: 2020-07-15 | Stop reason: HOSPADM

## 2020-07-14 RX ORDER — ONDANSETRON 8 MG/1
8 TABLET, ORALLY DISINTEGRATING ORAL EVERY 8 HOURS PRN
Status: DISCONTINUED | OUTPATIENT
Start: 2020-07-14 | End: 2020-07-15 | Stop reason: HOSPADM

## 2020-07-14 RX ORDER — ACETAMINOPHEN 325 MG/1
650 TABLET ORAL EVERY 8 HOURS PRN
Status: DISCONTINUED | OUTPATIENT
Start: 2020-07-14 | End: 2020-07-15 | Stop reason: HOSPADM

## 2020-07-14 RX ORDER — ONDANSETRON 2 MG/ML
4 INJECTION INTRAMUSCULAR; INTRAVENOUS
Status: COMPLETED | OUTPATIENT
Start: 2020-07-14 | End: 2020-07-14

## 2020-07-14 RX ORDER — ACETAMINOPHEN 325 MG/1
650 TABLET ORAL EVERY 4 HOURS PRN
Status: DISCONTINUED | OUTPATIENT
Start: 2020-07-14 | End: 2020-07-15 | Stop reason: HOSPADM

## 2020-07-14 RX ORDER — METOCLOPRAMIDE 5 MG/1
10 TABLET ORAL
Status: DISCONTINUED | OUTPATIENT
Start: 2020-07-14 | End: 2020-07-15 | Stop reason: HOSPADM

## 2020-07-14 RX ORDER — TALC
6 POWDER (GRAM) TOPICAL NIGHTLY PRN
Status: DISCONTINUED | OUTPATIENT
Start: 2020-07-14 | End: 2020-07-15 | Stop reason: HOSPADM

## 2020-07-14 RX ORDER — CEFEPIME HYDROCHLORIDE 1 G/50ML
1 INJECTION, SOLUTION INTRAVENOUS
Status: DISCONTINUED | OUTPATIENT
Start: 2020-07-14 | End: 2020-07-15 | Stop reason: HOSPADM

## 2020-07-14 RX ORDER — PANTOPRAZOLE SODIUM 40 MG/1
40 TABLET, DELAYED RELEASE ORAL DAILY
Status: DISCONTINUED | OUTPATIENT
Start: 2020-07-15 | End: 2020-07-14

## 2020-07-14 RX ORDER — PANTOPRAZOLE SODIUM 40 MG/1
40 TABLET, DELAYED RELEASE ORAL DAILY
Status: DISCONTINUED | OUTPATIENT
Start: 2020-07-14 | End: 2020-07-15 | Stop reason: HOSPADM

## 2020-07-14 RX ADMIN — ONDANSETRON 4 MG: 2 INJECTION INTRAMUSCULAR; INTRAVENOUS at 04:07

## 2020-07-14 RX ADMIN — HYDROCODONE BITARTRATE AND ACETAMINOPHEN 1 TABLET: 5; 325 TABLET ORAL at 04:07

## 2020-07-14 RX ADMIN — CEFEPIME HYDROCHLORIDE 1 G: 1 INJECTION, SOLUTION INTRAVENOUS at 01:07

## 2020-07-14 RX ADMIN — PANTOPRAZOLE SODIUM 40 MG: 40 TABLET, DELAYED RELEASE ORAL at 09:07

## 2020-07-14 RX ADMIN — GENTAMICIN SULFATE: 1 OINTMENT TOPICAL at 09:07

## 2020-07-14 RX ADMIN — DEXTROSE AND SODIUM CHLORIDE: 5; .45 INJECTION, SOLUTION INTRAVENOUS at 09:07

## 2020-07-14 RX ADMIN — METOCLOPRAMIDE 10 MG: 5 TABLET ORAL at 04:07

## 2020-07-14 RX ADMIN — SODIUM CHLORIDE, SODIUM LACTATE, POTASSIUM CHLORIDE, AND CALCIUM CHLORIDE 1000 ML: .6; .31; .03; .02 INJECTION, SOLUTION INTRAVENOUS at 09:07

## 2020-07-14 RX ADMIN — MORPHINE SULFATE 4 MG: 4 INJECTION INTRAVENOUS at 04:07

## 2020-07-14 NOTE — ED NOTES
Patient resting quietly in bed with eyes closed. No acute issues noted. Resp even and unlabored at 17

## 2020-07-14 NOTE — ED NOTES
Pt aware still waiting bed assignment. Pt resting on stretcher. Respirations even, unlabored. NADN will CTM

## 2020-07-14 NOTE — ED NOTES
Review of patient's allergies indicates:   Allergen Reactions    Chlorhexidine gluconate Rash     Chlorhexidine/alcohol wipes. Rash and blistering.        Patient has verified the spelling of their name and  on armband.   APPEARANCE: Patient is alert, calm, oriented x 4, and does not appear distressed.  SKIN: Skin is normal for race, warm, and dry. Normal skin turgor and mucous membranes moist. Abdominal wound to lower abdomen with clean, dry dressing in place.   CARDIAC: Normal rate and rhythm, no murmur heard.   RESPIRATORY:Normal rate and effort. Breath sounds clear bilaterally throughout chest. Respirations are equal and unlabored.    GASTRO: No BLQ bowel sounds noted, abdomen is soft, but tender to touch, some abdominal distention noted. Patient given pain medication prior to my arrival and reports that her she id doing ok right now. Patient belching a lot with breath smelling like bile.  MUSCLE: Full ROM. No bony tenderness or soft tissue tenderness. No obvious deformity.  PERIPHERAL VASCULAR: peripheral pulses present. Normal cap refill. No edema. Warm to touch.  NEURO: 5/5 strength major flexors/extensors bilaterally. Sensory intact to light touch bilaterally. [place] coma scale: eyes open spontaneously-4, oriented & converses-5, obeys commands-6. No neurological abnormalities.   MENTAL STATUS: awake, alert and aware of environment.  EYE: No overt deficits noted. No drainage. Sclera WNL  ENT: EARS: no obvious drainage. NOSE: no active bleeding. THROAT: no redness or swelling.  BREAST: symmetrical. No masses. No tenderness.  GENITALIA: Normal external genitalia.  : Voids without complication

## 2020-07-14 NOTE — H&P
Chief Complaint   Patient presents with    Abdominal Pain       Patient presents to the ED with reports of having abdominal pain with nausea and vomiting. States having a hx of multiple abdominal surgerys, patient of Dr. Horton. States having been admitted last week for similar symptoms.      HPI        Review of patient's allergies indicates:   Allergen Reactions    Chlorhexidine gluconate Rash       Chlorhexidine/alcohol wipes. Rash and blistering.          Past Medical History:   Diagnosis Date    Chronic kidney disease, stage 3      Diabetes mellitus      Diabetes mellitus, type 2      Hypertension             Past Surgical History:   Procedure Laterality Date     SECTION, CLASSIC         x2    CHOLECYSTECTOMY       [facility]GERY        [facility]    COLONOSCOPY N/A 2018     Procedure: COLONOSCOPY;  Surgeon: Gibran Aguayo MD;  Location: [facility] ENDO;  Service: Endoscopy;  Laterality: N/A;    COLOSTOMY Left     CYSTOSCOPY WITH URETEROSCOPY, RETROGRADE PYELOGRAPHY, AND INSERTION OF STENT Left 2019     Procedure: CYSTOSCOPY, WITH RETROGRADE PYELOGRAM AND URETERAL STENT INSERTION with placement of a muir cath;  Surgeon: Ulisses Horton MD;  Location: [facility] OR;  Service: General;  Laterality: Left;    INCISION AND DRAINAGE OF ABSCESS N/A 2019     Procedure: INCISION AND DRAINAGE, ABSCESS;  Surgeon: Ulisses Horton MD;  Location: [facility] OR;  Service: General;  Laterality: N/A;    INCISION OF ABDOMINAL WALL N/A 8/10/2018     Procedure: INCISION, ABDOMINAL WALL;  Surgeon: Ulisses Horton MD;  Location: [facility] OR;  Service: General;  Laterality: N/A;    INCISIONAL HERNIA REPAIR Right            Family History   Problem Relation Age of Onset    Stroke Mother      Heart disease Mother      Hyperlipidemia Mother      Hypertension Mother      Alcohol abuse Father      Stroke Sister      Diabetes Sister      Heart disease Sister      Hyperlipidemia  Sister      Hypertension Sister      Diabetes Brother      Early death Grandchild       Social History            Tobacco Use    Smoking status: Former Smoker       Types: Cigarettes       Quit date: 2000       Years since quittin.5    Smokeless tobacco: Never Used   Substance Use Topics    Alcohol use: No    Drug use: No     Review of Systems     Physical Exam             Initial Vitals [20 0618]   BP Pulse Resp Temp SpO2   (!) 103/58 107 20 98.1 °F (36.7 °C) 97 %       MAP           --             Physical Exam     ED Course   Procedures        Labs Reviewed   CBC W/ AUTO DIFFERENTIAL - Abnormal; Notable for the following components:       Result Value      RBC 5.45 (*)       Mean Corpuscular Volume 71 (*)       Mean Corpuscular Hemoglobin 22.8 (*)       RDW 16.2 (*)       Platelets 651 (*)       MPV 7.9 (*)       Gran # (ANC) 9.0 (*)       Immature Grans (Abs) 0.05 (*)       Gran% 73.1 (*)       Platelet Estimate Increased (*)       All other components within normal limits   COMPREHENSIVE METABOLIC PANEL - Abnormal; Notable for the following components:     Sodium 129 (*)       Potassium 3.2 (*)       Chloride 81 (*)       Glucose 144 (*)       BUN, Bld 25 (*)       Creatinine 2.2 (*)       Total Protein 9.8 (*)       Anion Gap 22 (*)       eGFR if  26 (*)       eGFR if non  22 (*)       All other components within normal limits   URINALYSIS - Abnormal; Notable for the following components:     Color, UA Brown (*)       Specific Gravity, UA >=1.030 (*)       Protein, UA Trace (*)       Ketones, UA 1+ (*)       Bilirubin (UA) 1+ (*)       All other components within normal limits   LIPASE - Abnormal; Notable for the following components:     Lipase 66 (*)       All other components within normal limits   POCT GLUCOSE - Abnormal; Notable for the following components:     POCT Glucose 146 (*)       All other components within normal limits   SARS-COV-2 RNA  AMPLIFICATION, QUAL   POCT GLUCOSE MONITORING CONTINUOUS            ECG Results                   EKG 12-lead (Final result)  Result time 07/13/20 14:59:44          Final result by Interface, Lab In [facility] (07/13/20 14:59:44)                               Narrative:     Test Reason : R10.9,     Vent. Rate : 094 BPM     Atrial Rate : 094 BPM     P-R Int : 126 ms          QRS Dur : 088 ms      QT Int : 356 ms       P-R-T Axes : -11 -36 072 degrees     QTc Int : 445 ms     Normal sinus rhythm  Left axis deviation  LVH with repolarization abnormality  Abnormal ECG  When compared with ECG of 09-JUL-2020 00:14,  No significant change was found  Confirmed by Roe GANNON MD, Yair MARMOLEJO ([id number]) on 7/13/2020 2:59:30 PM     Referred By: System System           Confirmed By:Yair Au III, MD                                          Imaging Results                    CT Abdomen Pelvis  Without Contrast (Final result)  Result time 07/13/20 09:01:33          Final result by Juan Carlos Arana MD (07/13/20 09:01:33)                               Impression:        Redemonstration of abnormal small bowel dilatation with air-fluid level concerning for partial small bowel obstruction, again with suspected transition in the midline lower abdomen at site of clustering and possibly related to adhesion.     Additional findings as above.     This report was flagged in Epic as abnormal.        Electronically signed by:       Juan Carlos Arana  Date:                                                07/13/2020  Time:                                                09:01                         Narrative:     EXAMINATION:  CT ABDOMEN PELVIS WITHOUT CONTRAST     CLINICAL HISTORY:  LLQ abdominal pain, diverticulitis suspected;     TECHNIQUE:  Low dose axial images, sagittal and coronal reformations were obtained from the lung bases to the pubic symphysis.  30 mL of oral Omnipaque 350 was administered.     COMPARISON:  CT abdomen pelvis with  contrast dated 07/05/2020     FINDINGS:  Limited images through the lower chest demonstrate similar right lower lobe 4 mm nodule (series 2, image 17).  There are also two right perifissural nodules, for reference 3 mm (series 601, image 37).  Bibasilar atelectasis.  Coronary calcific atherosclerosis.     Abdomen and pelvis:     The liver, spleen, pancreas, and adrenal glands demonstrate normal noncontrast appearance.  Gallbladder surgically absent.  No hydronephrosis.  Similar low-density left renal cortical lesions, some too small for definitive characterization though probable cysts.  Urinary bladder is unremarkable.  Uterus is present.     Similar postsurgical changes of the bowel.  Redemonstration of several dilated loops of small bowel with air-fluid level.  Area of clustering in the midline lower abdomen with suspected site of transition near this region.  Prior left ostomy site again noted.  Similar degree of stranding in the left lower quadrant as well as periumbilical soft tissue thickening, potentially inflammatory versus postprocedural in nature.  Closely traversing bowel loops subjacent to the periumbilical region again noted.  Few scattered colonic diverticula.     No adenopathy.  Multifocal abdominal aortic atherosclerosis.  Prior midline laparotomy.  Similar ventral hernia containing portion of nonobstructed bowel.  No aggressive osseous lesion.                                                  X-Ray Chest AP Portable (Final result)  Result time 07/13/20 07:14:41                Final result by Pal Chavez MD (07/13/20 07:14:41)                               Impression:        No convincing radiographic evidence of acute intrathoracic process on this single view.        Electronically signed by:       Pal Chavez MD  Date:                                                07/13/2020  Time:                                                07:14                         Narrative:      EXAMINATION:  XR CHEST AP PORTABLE     CLINICAL HISTORY:  Abdominal pain;     TECHNIQUE:  Single frontal view of the chest was performed.     COMPARISON:  02/27/2019     FINDINGS:  Cardiac monitoring leads overlie the chest.  Cardiomediastinal silhouette does not appear significantly enlarged.  There is atherosclerotic calcification of the thoracic aorta.  Lungs appear symmetrically expanded with mild coarse interstitial attenuation bilaterally.  No large confluent airspace consolidation or significant volume of pleural fluid appreciated.  No evidence of pneumothorax.  Osseous structures demonstrate degenerative changes.                                                                    Patient Condition: The patient has been stabilized such that, within reasonable medical probability, no material deterioration of the patient's condition or the condition of the unborn child(felicitas) is likely to result from transfer.  Reason for Transfer: Qualified clinical personnel unavailable  Benefits of Transfer: Evaluation by Colorectal surgery  Risks of Transfer: Deterioration en route, worsening of condition, loss of IV  Accepting Physician: Dr Caraballo   Sending Physician: Dr vivek SWEENEY Certification: Patient examined and risks and benefits explained, Patient and/or family/representative verbalize understanding           Clinical Impression:   DM, partial small bowel obstruction, colo cultaneous fistula, ventral hernia, Gastroparesis                      Plan: admit , iv antibiotics through pic[facility] line , awating transfer to [facility] for definitive surgery for colo cutaneous fistula.

## 2020-07-14 NOTE — ED NOTES
Patient resting quietly in bed and voiced no needs. Stated that she would call me if she needs anything. Lights are dim and door shut for privacy. Still awaiting a bed at [facility].

## 2020-07-14 NOTE — ED NOTES
Pt walked to restroom with steady gait, labs drawn, pt repositioned self in bed for comfort, denies any pain at this time, pt aaox3

## 2020-07-14 NOTE — PLAN OF CARE
VN cued into pt's room for introduction. VN informed pt that VN would be working along side bedside nurse and PCT throughout shift. Level of present pain assessed. At present no distress noted. Patient communicating with VN with broken English but demonstrates full understanding. Admission assessment data collection completed. Discussed with patient High fall risk protocol and interventions that have been initiated and cont be in place for safety. Patient verbalized clear understanding and cooperation using teach back method. Bed alarm presently activated and in use. Will cont to be available to patient and intervene prn.

## 2020-07-14 NOTE — ED NOTES
Called [facility] for pt update on bed assignment. Pt is in que for bed placement but at this time there are no available beds.  Spoke with [facility] for recommendations for medical management. [facility] left  for . Pending call back.

## 2020-07-14 NOTE — ED NOTES
Patient ambulated to restroom with no issues noted. No c/o pain at this time. IVF completed with no orders for further fluids. Patient given another pillow to put under her Rt am. Informed that we are still waiting for a bed at main campus. Patient voiced no needs at this time.

## 2020-07-14 NOTE — ED NOTES
Patient resting quietly in bed with eyes closed. Resp even and unlabored. Dressing to abdomen remains clean and intact. Patient voiced no needs at present.

## 2020-07-14 NOTE — ED PROVIDER NOTES
Encounter Date: 2020       History     Chief Complaint   Patient presents with    Abdominal Pain     Patient presents to the ED with reports of having abdominal pain with nausea and vomiting. States having a hx of multiple abdominal surgerys, patient of Dr. Horton. States having been admitted last week for similar symptoms.      HPI  Review of patient's allergies indicates:   Allergen Reactions    Chlorhexidine gluconate Rash     Chlorhexidine/alcohol wipes. Rash and blistering.     Past Medical History:   Diagnosis Date    Chronic kidney disease, stage 3     Diabetes mellitus     Diabetes mellitus, type 2     Hypertension      Past Surgical History:   Procedure Laterality Date     SECTION, CLASSIC      x2    CHOLECYSTECTOMY      [facility] SURGERY      [facility]    COLONOSCOPY N/A 2018    Procedure: COLONOSCOPY;  Surgeon: Gibran Aguayo MD;  Location: [facility];  Service: Endoscopy;  Laterality: N/A;    COLOSTOMY Left     CYSTOSCOPY WITH URETEROSCOPY, RETROGRADE PYELOGRAPHY, AND INSERTION OF STENT Left 2019    Procedure: CYSTOSCOPY, WITH RETROGRADE PYELOGRAM AND URETERAL STENT INSERTION with placement of a muir cath;  Surgeon: Ulisses Horton MD;  Location: [facility] OR;  Service: General;  Laterality: Left;    INCISION AND DRAINAGE OF ABSCESS N/A 2019    Procedure: INCISION AND DRAINAGE, ABSCESS;  Surgeon: Ulisses Horton MD;  Location: [facility] OR;  Service: General;  Laterality: N/A;    INCISION OF ABDOMINAL WALL N/A 8/10/2018    Procedure: INCISION, ABDOMINAL WALL;  Surgeon: Ulisses Horton MD;  Location: [facility] OR;  Service: General;  Laterality: N/A;    INCISIONAL HERNIA REPAIR Right      Family History   Problem Relation Age of Onset    Stroke Mother     Heart disease Mother     Hyperlipidemia Mother     Hypertension Mother     Alcohol abuse Father     Stroke Sister     Diabetes Sister     Heart disease Sister     Hyperlipidemia Sister      Hypertension Sister     Diabetes Brother     Early death Grandchild      Social History     Tobacco Use    Smoking status: Former Smoker     Types: Cigarettes     Quit date: 2000     Years since quittin.5    Smokeless tobacco: Never Used   Substance Use Topics    Alcohol use: No    Drug use: No     Review of Systems    Physical Exam     Initial Vitals [20 0618]   BP Pulse Resp Temp SpO2   (!) 103/58 107 20 98.1 °F (36.7 °C) 97 %      MAP       --         Physical Exam    ED Course   Procedures  Labs Reviewed   CBC W/ AUTO DIFFERENTIAL - Abnormal; Notable for the following components:       Result Value    RBC 5.45 (*)     Mean Corpuscular Volume 71 (*)     Mean Corpuscular Hemoglobin 22.8 (*)     RDW 16.2 (*)     Platelets 651 (*)     MPV 7.9 (*)     Gran # (ANC) 9.0 (*)     Immature Grans (Abs) 0.05 (*)     Gran% 73.1 (*)     Platelet Estimate Increased (*)     All other components within normal limits   COMPREHENSIVE METABOLIC PANEL - Abnormal; Notable for the following components:    Sodium 129 (*)     Potassium 3.2 (*)     Chloride 81 (*)     Glucose 144 (*)     BUN, Bld 25 (*)     Creatinine 2.2 (*)     Total Protein 9.8 (*)     Anion Gap 22 (*)     eGFR if  26 (*)     eGFR if non  22 (*)     All other components within normal limits   URINALYSIS - Abnormal; Notable for the following components:    Color, UA Brown (*)     Specific Gravity, UA >=1.030 (*)     Protein, UA Trace (*)     Ketones, UA 1+ (*)     Bilirubin (UA) 1+ (*)     All other components within normal limits   LIPASE - Abnormal; Notable for the following components:    Lipase 66 (*)     All other components within normal limits   POCT GLUCOSE - Abnormal; Notable for the following components:    POCT Glucose 146 (*)     All other components within normal limits   SARS-COV-2 RNA AMPLIFICATION, QUAL   POCT GLUCOSE MONITORING CONTINUOUS        ECG Results          EKG 12-lead (Final result)   Result time 07/13/20 14:59:44    Final result by Interface, Lab In [facility] (07/13/20 14:59:44)                 Narrative:    Test Reason : R10.9,    Vent. Rate : 094 BPM     Atrial Rate : 094 BPM     P-R Int : 126 ms          QRS Dur : 088 ms      QT Int : 356 ms       P-R-T Axes : -11 -36 072 degrees     QTc Int : 445 ms    Normal sinus rhythm  Left axis deviation  LVH with repolarization abnormality  Abnormal ECG  When compared with ECG of 09-JUL-2020 00:14,  No significant change was found  Confirmed by Roe GANNON MD, Yair MARMOLEJO ([id number]) on 7/13/2020 2:59:30 PM    Referred By: System System           Confirmed By:Yair Au III, MD                            Imaging Results           CT Abdomen Pelvis  Without Contrast (Final result)  Result time 07/13/20 09:01:33    Final result by Juan Carlos Arana MD (07/13/20 09:01:33)                 Impression:      Redemonstration of abnormal small bowel dilatation with air-fluid level concerning for partial small bowel obstruction, again with suspected transition in the midline lower abdomen at site of clustering and possibly related to adhesion.    Additional findings as above.    This report was flagged in Epic as abnormal.      Electronically signed by: Juan Carlos Arana  Date:    07/13/2020  Time:    09:01             Narrative:    EXAMINATION:  CT ABDOMEN PELVIS WITHOUT CONTRAST    CLINICAL HISTORY:  LLQ abdominal pain, diverticulitis suspected;    TECHNIQUE:  Low dose axial images, sagittal and coronal reformations were obtained from the lung bases to the pubic symphysis.  30 mL of oral Omnipaque 350 was administered.    COMPARISON:  CT abdomen pelvis with contrast dated 07/05/2020    FINDINGS:  Limited images through the lower chest demonstrate similar right lower lobe 4 mm nodule (series 2, image 17).  There are also two right perifissural nodules, for reference 3 mm (series 601, image 37).  Bibasilar atelectasis.  Coronary calcific  atherosclerosis.    Abdomen and pelvis:    The liver, spleen, pancreas, and adrenal glands demonstrate normal noncontrast appearance.  Gallbladder surgically absent.  No hydronephrosis.  Similar low-density left renal cortical lesions, some too small for definitive characterization though probable cysts.  Urinary bladder is unremarkable.  Uterus is present.    Similar postsurgical changes of the bowel.  Redemonstration of several dilated loops of small bowel with air-fluid level.  Area of clustering in the midline lower abdomen with suspected site of transition near this region.  Prior left ostomy site again noted.  Similar degree of stranding in the left lower quadrant as well as periumbilical soft tissue thickening, potentially inflammatory versus postprocedural in nature.  Closely traversing bowel loops subjacent to the periumbilical region again noted.  Few scattered colonic diverticula.    No adenopathy.  Multifocal abdominal aortic atherosclerosis.  Prior midline laparotomy.  Similar ventral hernia containing portion of nonobstructed bowel.  No aggressive osseous lesion.                               X-Ray Chest AP Portable (Final result)  Result time 07/13/20 07:14:41    Final result by Pal Chavez MD (07/13/20 07:14:41)                 Impression:      No convincing radiographic evidence of acute intrathoracic process on this single view.      Electronically signed by: Pal Chavez MD  Date:    07/13/2020  Time:    07:14             Narrative:    EXAMINATION:  XR CHEST AP PORTABLE    CLINICAL HISTORY:  Abdominal pain;    TECHNIQUE:  Single frontal view of the chest was performed.    COMPARISON:  02/27/2019    FINDINGS:  Cardiac monitoring leads overlie the chest.  Cardiomediastinal silhouette does not appear significantly enlarged.  There is atherosclerotic calcification of the thoracic aorta.  Lungs appear symmetrically expanded with mild coarse interstitial attenuation bilaterally.  No large  confluent airspace consolidation or significant volume of pleural fluid appreciated.  No evidence of pneumothorax.  Osseous structures demonstrate degenerative changes.                                                        Patient Condition: The patient has been stabilized such that, within reasonable medical probability, no material deterioration of the patient's condition or the condition of the unborn child(felicitas) is likely to result from transfer.  Reason for Transfer: Qualified clinical personnel unavailable  Benefits of Transfer: Evaluation by Colorectal surgery  Risks of Transfer: Deterioration en route, worsening of condition, loss of IV  Accepting Physician: Dr Caraballo   Sending Physician: Dr vivke SWEENEY Certification: Patient examined and risks and benefits explained, Patient and/or family/representative verbalize understanding        Clinical Impression:   {Add your Clinical Impression here. If you haven't documented one yet, please pend the note, finalize a Clinical Impression, and refresh your note before signing.:[id number]}          ED Disposition Condition    Admit

## 2020-07-15 ENCOUNTER — HOSPITAL ENCOUNTER (INPATIENT)
Facility: HOSPITAL | Age: 70
LOS: 37 days | Discharge: NURSING, REHAB OR LONG-TERM CARE | DRG: 329 | End: 2020-08-21
Attending: COLON & RECTAL SURGERY | Admitting: COLON & RECTAL SURGERY
Payer: MEDICARE

## 2020-07-15 VITALS
BODY MASS INDEX: 29.27 KG/M2 | DIASTOLIC BLOOD PRESSURE: 90 MMHG | TEMPERATURE: 97 F | HEIGHT: 61 IN | SYSTOLIC BLOOD PRESSURE: 153 MMHG | HEART RATE: 82 BPM | RESPIRATION RATE: 20 BRPM | OXYGEN SATURATION: 97 % | WEIGHT: 155 LBS

## 2020-07-15 DIAGNOSIS — L98.8 CUTANEOUS FISTULA: ICD-10-CM

## 2020-07-15 DIAGNOSIS — I10 ESSENTIAL HYPERTENSION: Chronic | ICD-10-CM

## 2020-07-15 DIAGNOSIS — Z90.49 HISTORY OF HEMICOLECTOMY: Primary | Chronic | ICD-10-CM

## 2020-07-15 DIAGNOSIS — N18.30 CHRONIC KIDNEY DISEASE, STAGE 3: Chronic | ICD-10-CM

## 2020-07-15 DIAGNOSIS — E11.9 TYPE 2 DIABETES MELLITUS WITHOUT COMPLICATION, WITH LONG-TERM CURRENT USE OF INSULIN: Chronic | ICD-10-CM

## 2020-07-15 DIAGNOSIS — Z78.9 ON TOTAL PARENTERAL NUTRITION (TPN): ICD-10-CM

## 2020-07-15 DIAGNOSIS — Z79.4 TYPE 2 DIABETES MELLITUS WITHOUT COMPLICATION, WITH LONG-TERM CURRENT USE OF INSULIN: Chronic | ICD-10-CM

## 2020-07-15 LAB
POCT GLUCOSE: 124 MG/DL (ref 70–110)
POCT GLUCOSE: 128 MG/DL (ref 70–110)
POCT GLUCOSE: 145 MG/DL (ref 70–110)
POCT GLUCOSE: 159 MG/DL (ref 70–110)

## 2020-07-15 PROCEDURE — 63600175 PHARM REV CODE 636 W HCPCS: Performed by: STUDENT IN AN ORGANIZED HEALTH CARE EDUCATION/TRAINING PROGRAM

## 2020-07-15 PROCEDURE — S5010 5% DEXTROSE AND 0.45% SALINE: HCPCS | Performed by: SURGERY

## 2020-07-15 PROCEDURE — 63700000 PHARM REV CODE 250 ALT 637 W/O HCPCS: Performed by: SURGERY

## 2020-07-15 PROCEDURE — 63600175 PHARM REV CODE 636 W HCPCS: Performed by: SURGERY

## 2020-07-15 PROCEDURE — 85025 COMPLETE CBC W/AUTO DIFF WBC: CPT

## 2020-07-15 PROCEDURE — S5010 5% DEXTROSE AND 0.45% SALINE: HCPCS | Performed by: STUDENT IN AN ORGANIZED HEALTH CARE EDUCATION/TRAINING PROGRAM

## 2020-07-15 PROCEDURE — 25000003 PHARM REV CODE 250: Performed by: SURGERY

## 2020-07-15 PROCEDURE — 25000003 PHARM REV CODE 250: Performed by: STUDENT IN AN ORGANIZED HEALTH CARE EDUCATION/TRAINING PROGRAM

## 2020-07-15 PROCEDURE — 63600175 PHARM REV CODE 636 W HCPCS

## 2020-07-15 PROCEDURE — 80053 COMPREHEN METABOLIC PANEL: CPT

## 2020-07-15 PROCEDURE — 36415 COLL VENOUS BLD VENIPUNCTURE: CPT

## 2020-07-15 PROCEDURE — 94761 N-INVAS EAR/PLS OXIMETRY MLT: CPT

## 2020-07-15 PROCEDURE — 86141 C-REACTIVE PROTEIN HS: CPT

## 2020-07-15 PROCEDURE — 11000001 HC ACUTE MED/SURG PRIVATE ROOM

## 2020-07-15 PROCEDURE — 84134 ASSAY OF PREALBUMIN: CPT

## 2020-07-15 RX ORDER — CARVEDILOL 25 MG/1
25 TABLET ORAL DAILY
Status: DISCONTINUED | OUTPATIENT
Start: 2020-07-16 | End: 2020-08-01

## 2020-07-15 RX ORDER — GLUCAGON 1 MG
1 KIT INJECTION
Status: DISCONTINUED | OUTPATIENT
Start: 2020-07-15 | End: 2020-08-01

## 2020-07-15 RX ORDER — HYDROMORPHONE HYDROCHLORIDE 1 MG/ML
0.8 INJECTION, SOLUTION INTRAMUSCULAR; INTRAVENOUS; SUBCUTANEOUS
Status: DISCONTINUED | OUTPATIENT
Start: 2020-07-15 | End: 2020-07-20

## 2020-07-15 RX ORDER — ONDANSETRON 2 MG/ML
8 INJECTION INTRAMUSCULAR; INTRAVENOUS EVERY 8 HOURS PRN
Status: DISCONTINUED | OUTPATIENT
Start: 2020-07-16 | End: 2020-07-16

## 2020-07-15 RX ORDER — DEXTROSE MONOHYDRATE AND SODIUM CHLORIDE 5; .45 G/100ML; G/100ML
INJECTION, SOLUTION INTRAVENOUS CONTINUOUS
Status: ACTIVE | OUTPATIENT
Start: 2020-07-15 | End: 2020-07-16

## 2020-07-15 RX ORDER — INSULIN ASPART 100 [IU]/ML
0-5 INJECTION, SOLUTION INTRAVENOUS; SUBCUTANEOUS EVERY 6 HOURS PRN
Status: DISCONTINUED | OUTPATIENT
Start: 2020-07-15 | End: 2020-07-27

## 2020-07-15 RX ORDER — ONDANSETRON 2 MG/ML
4 INJECTION INTRAMUSCULAR; INTRAVENOUS ONCE AS NEEDED
Status: DISCONTINUED | OUTPATIENT
Start: 2020-07-15 | End: 2020-07-15 | Stop reason: HOSPADM

## 2020-07-15 RX ORDER — ENOXAPARIN SODIUM 100 MG/ML
40 INJECTION SUBCUTANEOUS EVERY 24 HOURS
Status: DISCONTINUED | OUTPATIENT
Start: 2020-07-16 | End: 2020-07-31

## 2020-07-15 RX ORDER — MORPHINE SULFATE 4 MG/ML
INJECTION, SOLUTION INTRAMUSCULAR; INTRAVENOUS
Status: COMPLETED
Start: 2020-07-15 | End: 2020-07-15

## 2020-07-15 RX ORDER — HYDROMORPHONE HYDROCHLORIDE 1 MG/ML
0.5 INJECTION, SOLUTION INTRAMUSCULAR; INTRAVENOUS; SUBCUTANEOUS
Status: DISCONTINUED | OUTPATIENT
Start: 2020-07-15 | End: 2020-07-20

## 2020-07-15 RX ORDER — POTASSIUM CHLORIDE 20 MEQ/15ML
20 SOLUTION ORAL ONCE
Status: COMPLETED | OUTPATIENT
Start: 2020-07-15 | End: 2020-07-15

## 2020-07-15 RX ORDER — ONDANSETRON 4 MG/1
4 TABLET, ORALLY DISINTEGRATING ORAL ONCE
Status: COMPLETED | OUTPATIENT
Start: 2020-07-15 | End: 2020-07-15

## 2020-07-15 RX ORDER — ONDANSETRON 2 MG/ML
INJECTION INTRAMUSCULAR; INTRAVENOUS
Status: COMPLETED
Start: 2020-07-15 | End: 2020-07-15

## 2020-07-15 RX ORDER — MORPHINE SULFATE 10 MG/ML
4 INJECTION, SOLUTION INTRAMUSCULAR; INTRAVENOUS ONCE
Status: DISCONTINUED | OUTPATIENT
Start: 2020-07-15 | End: 2020-07-15 | Stop reason: HOSPADM

## 2020-07-15 RX ORDER — OXYCODONE AND ACETAMINOPHEN 5; 325 MG/1; MG/1
1 TABLET ORAL EVERY 4 HOURS PRN
Status: DISCONTINUED | OUTPATIENT
Start: 2020-07-15 | End: 2020-07-15 | Stop reason: HOSPADM

## 2020-07-15 RX ADMIN — POTASSIUM CHLORIDE 20 MEQ: 20 SOLUTION ORAL at 07:07

## 2020-07-15 RX ADMIN — HYDROCODONE BITARTRATE AND ACETAMINOPHEN 1 TABLET: 5; 325 TABLET ORAL at 03:07

## 2020-07-15 RX ADMIN — METOCLOPRAMIDE 10 MG: 5 TABLET ORAL at 12:07

## 2020-07-15 RX ADMIN — HYDROCODONE BITARTRATE AND ACETAMINOPHEN 1 TABLET: 5; 325 TABLET ORAL at 10:07

## 2020-07-15 RX ADMIN — DEXTROSE AND SODIUM CHLORIDE: 5; .45 INJECTION, SOLUTION INTRAVENOUS at 11:07

## 2020-07-15 RX ADMIN — GENTAMICIN SULFATE: 1 OINTMENT TOPICAL at 09:07

## 2020-07-15 RX ADMIN — ONDANSETRON 4 MG: 2 INJECTION INTRAMUSCULAR; INTRAVENOUS at 05:07

## 2020-07-15 RX ADMIN — CEFEPIME HYDROCHLORIDE 1 G: 1 INJECTION, SOLUTION INTRAVENOUS at 12:07

## 2020-07-15 RX ADMIN — HYDROMORPHONE HYDROCHLORIDE 0.8 MG: 1 INJECTION, SOLUTION INTRAMUSCULAR; INTRAVENOUS; SUBCUTANEOUS at 11:07

## 2020-07-15 RX ADMIN — ONDANSETRON 4 MG: 4 TABLET, ORALLY DISINTEGRATING ORAL at 11:07

## 2020-07-15 RX ADMIN — MORPHINE SULFATE 4 MG: 4 INJECTION INTRAVENOUS at 05:07

## 2020-07-15 RX ADMIN — ONDANSETRON 8 MG: 8 TABLET, ORALLY DISINTEGRATING ORAL at 01:07

## 2020-07-15 RX ADMIN — CEFEPIME HYDROCHLORIDE 1 G: 1 INJECTION, SOLUTION INTRAVENOUS at 01:07

## 2020-07-15 RX ADMIN — METOCLOPRAMIDE 10 MG: 5 TABLET ORAL at 03:07

## 2020-07-15 RX ADMIN — METOCLOPRAMIDE 10 MG: 5 TABLET ORAL at 05:07

## 2020-07-15 RX ADMIN — PANTOPRAZOLE SODIUM 40 MG: 40 TABLET, DELAYED RELEASE ORAL at 09:07

## 2020-07-15 RX ADMIN — OXYCODONE HYDROCHLORIDE AND ACETAMINOPHEN 1 TABLET: 5; 325 TABLET ORAL at 01:07

## 2020-07-15 RX ADMIN — HYDROCODONE BITARTRATE AND ACETAMINOPHEN 1 TABLET: 5; 325 TABLET ORAL at 05:07

## 2020-07-15 RX ADMIN — DEXTROSE AND SODIUM CHLORIDE: 5; .45 INJECTION, SOLUTION INTRAVENOUS at 09:07

## 2020-07-15 NOTE — PLAN OF CARE
Problem: Adult Inpatient Plan of Care  Goal: Plan of Care Review    Patient is awake and orientedx4. Care plan explained to patient; she verbalized understanding. On room air, O2 saturation at 99%. Hooked to heart monitor running normal sinus rhythm at 60-75bpm. Up to toilet as needed. No n/v/d during shift. Patient had abdominal pain, prn meds given. Abdominal dressing changed: cleansed with NS, applied gentamycin, 4x4, and abd pain. Yellow drainage noted to wounds. Due medications given. Encouraged to turn every 2 hours as tolerated. Maintained on fall risk precaution. Bed in lowest position, bed alarm on, call light/personal items within reach and instructed to call for help when needed. Will continue to monitor.    Transfer center called and said they are still waiting on a bed.  Outcome: Ongoing, Progressing

## 2020-07-15 NOTE — PLAN OF CARE
VN cued into patients room for rounding - patient resting in bed in no acute distress at this time. Call bell within reach. Will continue to monitor and be available to intervene PRN.

## 2020-07-15 NOTE — PLAN OF CARE
Problem: Wound  Goal: Optimal Wound Healing  Outcome: Ongoing, Progressing     Problem: Adult Inpatient Plan of Care  Goal: Plan of Care Review  Outcome: Ongoing, Progressing  Goal: Absence of Hospital-Acquired Illness or Injury  Outcome: Ongoing, Progressing  Goal: Optimal Comfort and Wellbeing  Outcome: Ongoing, Progressing

## 2020-07-15 NOTE — NURSING
[facility] called about pt transferring to the main campus.. Spoke with Jodi for room number pt is being transferred to rm [id number]. Pt informed of  in an hour. Will call report to 5th floor nurse.

## 2020-07-16 LAB
ALBUMIN SERPL BCP-MCNC: 3 G/DL (ref 3.5–5.2)
ALBUMIN SERPL BCP-MCNC: 3.3 G/DL (ref 3.5–5.2)
ALP SERPL-CCNC: 67 U/L (ref 55–135)
ALP SERPL-CCNC: 79 U/L (ref 55–135)
ALT SERPL W/O P-5'-P-CCNC: 10 U/L (ref 10–44)
ALT SERPL W/O P-5'-P-CCNC: 12 U/L (ref 10–44)
ANION GAP SERPL CALC-SCNC: 10 MMOL/L (ref 8–16)
ANION GAP SERPL CALC-SCNC: 11 MMOL/L (ref 8–16)
AST SERPL-CCNC: 19 U/L (ref 10–40)
AST SERPL-CCNC: 19 U/L (ref 10–40)
BASOPHILS # BLD AUTO: 0.02 K/UL (ref 0–0.2)
BASOPHILS # BLD AUTO: 0.02 K/UL (ref 0–0.2)
BASOPHILS NFR BLD: 0.2 % (ref 0–1.9)
BASOPHILS NFR BLD: 0.2 % (ref 0–1.9)
BILIRUB SERPL-MCNC: 0.2 MG/DL (ref 0.1–1)
BILIRUB SERPL-MCNC: 0.3 MG/DL (ref 0.1–1)
BUN SERPL-MCNC: 5 MG/DL (ref 8–23)
BUN SERPL-MCNC: 6 MG/DL (ref 8–23)
CALCIUM SERPL-MCNC: 9.2 MG/DL (ref 8.7–10.5)
CALCIUM SERPL-MCNC: 9.6 MG/DL (ref 8.7–10.5)
CHLORIDE SERPL-SCNC: 95 MMOL/L (ref 95–110)
CHLORIDE SERPL-SCNC: 97 MMOL/L (ref 95–110)
CO2 SERPL-SCNC: 28 MMOL/L (ref 23–29)
CO2 SERPL-SCNC: 29 MMOL/L (ref 23–29)
CREAT SERPL-MCNC: 0.9 MG/DL (ref 0.5–1.4)
CREAT SERPL-MCNC: 1 MG/DL (ref 0.5–1.4)
CRP SERPL-MCNC: 46.55 MG/L (ref 0–3.19)
DIFFERENTIAL METHOD: ABNORMAL
DIFFERENTIAL METHOD: ABNORMAL
EOSINOPHIL # BLD AUTO: 0.1 K/UL (ref 0–0.5)
EOSINOPHIL # BLD AUTO: 0.1 K/UL (ref 0–0.5)
EOSINOPHIL NFR BLD: 0.5 % (ref 0–8)
EOSINOPHIL NFR BLD: 0.9 % (ref 0–8)
ERYTHROCYTE [DISTWIDTH] IN BLOOD BY AUTOMATED COUNT: 16.3 % (ref 11.5–14.5)
ERYTHROCYTE [DISTWIDTH] IN BLOOD BY AUTOMATED COUNT: 16.5 % (ref 11.5–14.5)
EST. GFR  (AFRICAN AMERICAN): >60 ML/MIN/1.73 M^2
EST. GFR  (AFRICAN AMERICAN): >60 ML/MIN/1.73 M^2
EST. GFR  (NON AFRICAN AMERICAN): 57.6 ML/MIN/1.73 M^2
EST. GFR  (NON AFRICAN AMERICAN): >60 ML/MIN/1.73 M^2
GLUCOSE SERPL-MCNC: 136 MG/DL (ref 70–110)
GLUCOSE SERPL-MCNC: 151 MG/DL (ref 70–110)
HCT VFR BLD AUTO: 32.4 % (ref 37–48.5)
HCT VFR BLD AUTO: 33.4 % (ref 37–48.5)
HGB BLD-MCNC: 10.1 G/DL (ref 12–16)
HGB BLD-MCNC: 10.5 G/DL (ref 12–16)
IMM GRANULOCYTES # BLD AUTO: 0.03 K/UL (ref 0–0.04)
IMM GRANULOCYTES # BLD AUTO: 0.04 K/UL (ref 0–0.04)
IMM GRANULOCYTES NFR BLD AUTO: 0.3 % (ref 0–0.5)
IMM GRANULOCYTES NFR BLD AUTO: 0.4 % (ref 0–0.5)
LYMPHOCYTES # BLD AUTO: 1.9 K/UL (ref 1–4.8)
LYMPHOCYTES # BLD AUTO: 2.2 K/UL (ref 1–4.8)
LYMPHOCYTES NFR BLD: 17.4 % (ref 18–48)
LYMPHOCYTES NFR BLD: 21.5 % (ref 18–48)
MAGNESIUM SERPL-MCNC: 1.6 MG/DL (ref 1.6–2.6)
MCH RBC QN AUTO: 23 PG (ref 27–31)
MCH RBC QN AUTO: 23.1 PG (ref 27–31)
MCHC RBC AUTO-ENTMCNC: 31.2 G/DL (ref 32–36)
MCHC RBC AUTO-ENTMCNC: 31.4 G/DL (ref 32–36)
MCV RBC AUTO: 73 FL (ref 82–98)
MCV RBC AUTO: 74 FL (ref 82–98)
MONOCYTES # BLD AUTO: 0.6 K/UL (ref 0.3–1)
MONOCYTES # BLD AUTO: 0.6 K/UL (ref 0.3–1)
MONOCYTES NFR BLD: 5.6 % (ref 4–15)
MONOCYTES NFR BLD: 5.8 % (ref 4–15)
NEUTROPHILS # BLD AUTO: 7.2 K/UL (ref 1.8–7.7)
NEUTROPHILS # BLD AUTO: 8.4 K/UL (ref 1.8–7.7)
NEUTROPHILS NFR BLD: 71.5 % (ref 38–73)
NEUTROPHILS NFR BLD: 75.7 % (ref 38–73)
NRBC BLD-RTO: 0 /100 WBC
NRBC BLD-RTO: 0 /100 WBC
PHOSPHATE SERPL-MCNC: 5.1 MG/DL (ref 2.7–4.5)
PLATELET # BLD AUTO: 469 K/UL (ref 150–350)
PLATELET # BLD AUTO: 473 K/UL (ref 150–350)
PMV BLD AUTO: 8.3 FL (ref 9.2–12.9)
PMV BLD AUTO: 8.5 FL (ref 9.2–12.9)
POCT GLUCOSE: 121 MG/DL (ref 70–110)
POCT GLUCOSE: 127 MG/DL (ref 70–110)
POCT GLUCOSE: 132 MG/DL (ref 70–110)
POCT GLUCOSE: 152 MG/DL (ref 70–110)
POTASSIUM SERPL-SCNC: 3.3 MMOL/L (ref 3.5–5.1)
POTASSIUM SERPL-SCNC: 3.7 MMOL/L (ref 3.5–5.1)
PREALB SERPL-MCNC: 21 MG/DL (ref 20–43)
PROT SERPL-MCNC: 7.7 G/DL (ref 6–8.4)
PROT SERPL-MCNC: 8.2 G/DL (ref 6–8.4)
RBC # BLD AUTO: 4.38 M/UL (ref 4–5.4)
RBC # BLD AUTO: 4.56 M/UL (ref 4–5.4)
SODIUM SERPL-SCNC: 135 MMOL/L (ref 136–145)
SODIUM SERPL-SCNC: 135 MMOL/L (ref 136–145)
WBC # BLD AUTO: 10.04 K/UL (ref 3.9–12.7)
WBC # BLD AUTO: 11.13 K/UL (ref 3.9–12.7)

## 2020-07-16 PROCEDURE — 25500020 PHARM REV CODE 255: Performed by: STUDENT IN AN ORGANIZED HEALTH CARE EDUCATION/TRAINING PROGRAM

## 2020-07-16 PROCEDURE — 25500020 PHARM REV CODE 255: Performed by: COLON & RECTAL SURGERY

## 2020-07-16 PROCEDURE — 36415 COLL VENOUS BLD VENIPUNCTURE: CPT

## 2020-07-16 PROCEDURE — C1751 CATH, INF, PER/CENT/MIDLINE: HCPCS

## 2020-07-16 PROCEDURE — 83735 ASSAY OF MAGNESIUM: CPT

## 2020-07-16 PROCEDURE — 11000001 HC ACUTE MED/SURG PRIVATE ROOM

## 2020-07-16 PROCEDURE — 63600175 PHARM REV CODE 636 W HCPCS: Performed by: NURSE PRACTITIONER

## 2020-07-16 PROCEDURE — 76937 US GUIDE VASCULAR ACCESS: CPT

## 2020-07-16 PROCEDURE — 63600175 PHARM REV CODE 636 W HCPCS: Performed by: STUDENT IN AN ORGANIZED HEALTH CARE EDUCATION/TRAINING PROGRAM

## 2020-07-16 PROCEDURE — 25000003 PHARM REV CODE 250: Performed by: STUDENT IN AN ORGANIZED HEALTH CARE EDUCATION/TRAINING PROGRAM

## 2020-07-16 PROCEDURE — A4216 STERILE WATER/SALINE, 10 ML: HCPCS | Performed by: COLON & RECTAL SURGERY

## 2020-07-16 PROCEDURE — 85025 COMPLETE CBC W/AUTO DIFF WBC: CPT

## 2020-07-16 PROCEDURE — 36573 INSJ PICC RS&I 5 YR+: CPT

## 2020-07-16 PROCEDURE — A4217 STERILE WATER/SALINE, 500 ML: HCPCS | Performed by: NURSE PRACTITIONER

## 2020-07-16 PROCEDURE — B4185 PARENTERAL SOL 10 GM LIPIDS: HCPCS | Performed by: NURSE PRACTITIONER

## 2020-07-16 PROCEDURE — 80053 COMPREHEN METABOLIC PANEL: CPT

## 2020-07-16 PROCEDURE — 84100 ASSAY OF PHOSPHORUS: CPT

## 2020-07-16 PROCEDURE — 25000003 PHARM REV CODE 250: Performed by: NURSE PRACTITIONER

## 2020-07-16 PROCEDURE — 25000003 PHARM REV CODE 250: Performed by: COLON & RECTAL SURGERY

## 2020-07-16 PROCEDURE — S5010 5% DEXTROSE AND 0.45% SALINE: HCPCS | Performed by: STUDENT IN AN ORGANIZED HEALTH CARE EDUCATION/TRAINING PROGRAM

## 2020-07-16 RX ORDER — INSULIN ASPART 100 [IU]/ML
1-10 INJECTION, SOLUTION INTRAVENOUS; SUBCUTANEOUS EVERY 4 HOURS PRN
Status: DISCONTINUED | OUTPATIENT
Start: 2020-07-16 | End: 2020-08-02

## 2020-07-16 RX ORDER — SODIUM CHLORIDE 0.9 % (FLUSH) 0.9 %
10 SYRINGE (ML) INJECTION EVERY 6 HOURS
Status: DISCONTINUED | OUTPATIENT
Start: 2020-07-16 | End: 2020-08-21 | Stop reason: HOSPADM

## 2020-07-16 RX ORDER — SODIUM CHLORIDE 0.9 % (FLUSH) 0.9 %
10 SYRINGE (ML) INJECTION
Status: DISCONTINUED | OUTPATIENT
Start: 2020-07-16 | End: 2020-08-21 | Stop reason: HOSPADM

## 2020-07-16 RX ORDER — GLUCAGON 1 MG
1 KIT INJECTION
Status: DISCONTINUED | OUTPATIENT
Start: 2020-07-16 | End: 2020-08-02

## 2020-07-16 RX ORDER — ONDANSETRON 2 MG/ML
8 INJECTION INTRAMUSCULAR; INTRAVENOUS EVERY 6 HOURS PRN
Status: DISCONTINUED | OUTPATIENT
Start: 2020-07-16 | End: 2020-08-21 | Stop reason: HOSPADM

## 2020-07-16 RX ADMIN — IOHEXOL 15 ML: 350 INJECTION, SOLUTION INTRAVENOUS at 04:07

## 2020-07-16 RX ADMIN — IOHEXOL 75 ML: 350 INJECTION, SOLUTION INTRAVENOUS at 08:07

## 2020-07-16 RX ADMIN — IOHEXOL 15 ML: 350 INJECTION, SOLUTION INTRAVENOUS at 05:07

## 2020-07-16 RX ADMIN — ONDANSETRON 8 MG: 2 INJECTION INTRAMUSCULAR; INTRAVENOUS at 04:07

## 2020-07-16 RX ADMIN — HYDROMORPHONE HYDROCHLORIDE 0.8 MG: 1 INJECTION, SOLUTION INTRAMUSCULAR; INTRAVENOUS; SUBCUTANEOUS at 05:07

## 2020-07-16 RX ADMIN — MAGNESIUM SULFATE HEPTAHYDRATE: 500 INJECTION, SOLUTION INTRAMUSCULAR; INTRAVENOUS at 10:07

## 2020-07-16 RX ADMIN — CARVEDILOL 25 MG: 25 TABLET, FILM COATED ORAL at 10:07

## 2020-07-16 RX ADMIN — SOYBEAN OIL 250 ML: 20 INJECTION, SOLUTION INTRAVENOUS at 10:07

## 2020-07-16 RX ADMIN — PROMETHAZINE HYDROCHLORIDE 6.25 MG: 25 INJECTION INTRAMUSCULAR; INTRAVENOUS at 10:07

## 2020-07-16 RX ADMIN — DEXTROSE AND SODIUM CHLORIDE: 5; .45 INJECTION, SOLUTION INTRAVENOUS at 05:07

## 2020-07-16 RX ADMIN — ONDANSETRON 8 MG: 2 INJECTION INTRAMUSCULAR; INTRAVENOUS at 05:07

## 2020-07-16 RX ADMIN — PROMETHAZINE HYDROCHLORIDE 6.25 MG: 25 INJECTION INTRAMUSCULAR; INTRAVENOUS at 08:07

## 2020-07-16 RX ADMIN — ENOXAPARIN SODIUM 40 MG: 100 INJECTION SUBCUTANEOUS at 04:07

## 2020-07-16 RX ADMIN — HYDROMORPHONE HYDROCHLORIDE 0.5 MG: 1 INJECTION, SOLUTION INTRAMUSCULAR; INTRAVENOUS; SUBCUTANEOUS at 01:07

## 2020-07-16 RX ADMIN — HYDROMORPHONE HYDROCHLORIDE 0.8 MG: 1 INJECTION, SOLUTION INTRAMUSCULAR; INTRAVENOUS; SUBCUTANEOUS at 08:07

## 2020-07-16 RX ADMIN — POTASSIUM PHOSPHATE, MONOBASIC AND POTASSIUM PHOSPHATE, DIBASIC 30 MMOL: 224; 236 INJECTION, SOLUTION, CONCENTRATE INTRAVENOUS at 01:07

## 2020-07-16 RX ADMIN — Medication 10 ML: at 01:07

## 2020-07-16 RX ADMIN — Medication 10 ML: at 04:07

## 2020-07-16 NOTE — PLAN OF CARE
Patient lives with son and dgt in law. Has used [facility] in past and was happy with their care.      07/16/20 1300   Discharge Assessment   Assessment Type Discharge Planning Assessment   Confirmed/corrected address and phone number on facesheet? Yes   Assessment information obtained from? Patient   Expected Length of Stay (days) 3   Communicated expected length of stay with patient/caregiver yes   Prior to hospitilization cognitive status: Alert/Oriented   Prior to hospitalization functional status: Independent   Current cognitive status: Alert/Oriented   Current Functional Status: Independent   Lives With child(felicitas), adult  (Son and dgt in law)   Able to Return to Prior Arrangements yes   Is patient able to care for self after discharge? Yes   Who are your caregiver(s) and their phone number(s)? Andry Nickerson son [phone]   Patient's perception of discharge disposition home or selfcare   Patient currently being followed by outpatient case management? No   Patient currently receives any other outside agency services? No   Equipment Currently Used at Home   (has used [facility] in past)   Do you have any problems affording any of your prescribed medications? No   Is the patient taking medications as prescribed? yes   Does the patient have transportation home? Yes  (son)   Transportation Anticipated family or friend will provide   Dialysis Name and Scheduled days n/a   Does the patient receive services at the [facility]? No   Discharge Plan A Home with family   Discharge Plan B Home Health   DME Needed Upon Discharge  none   Patient/Family in Agreement with Plan yes

## 2020-07-16 NOTE — H&P
[facility]  General Surgery  History & Physical    Patient Name: Azucena Morrison  MRN: [id number]  Admission Date: 7/15/2020  Attending Physician: Fabiana Valiente MD   Primary Care Provider: Pj Sanches MD    Patient information was obtained from patient and ER records.     Subjective:     Chief Complaint/Reason for Admission: Direct Admit from [place] for Dr. Valiente    History of Present Illness:  Patient is a 70 y.o. female presents with abdominal pain and vomiting. Onset of symptoms was gradual starting 3 weeks ago with gradually worsening course since that time. Patient has a history of DM II, HTN, and complicated surgical history, including h/o colon resection and Aiyana procedure, takedown colostomy, colostomy closure, clot of colo cutaneous fistula. Patient  is currently being followed weekly with wound care for her 2 colocutaneous fistulas. CT scan from OSH showed partially obstructed small bowel. Symptoms are aggravated by movement and palpation. Symptoms improve slightly with pain medication. Patient vomited 3 times while I was in the room with her. Patient has passed gas and bowel movements since yesterday. This is the extent of the patients complaints at this time.     Of note, patient's primary language is Russian.  An  was used.    Current Facility-Administered Medications on File Prior to Encounter   Medication    [COMPLETED] morphine 4 mg/mL injection    [COMPLETED] ondansetron 4 mg/2 mL injection    [COMPLETED] potassium chloride 10% oral solution 20 mEq    [DISCONTINUED] acetaminophen tablet 650 mg    [DISCONTINUED] acetaminophen tablet 650 mg    [DISCONTINUED] cefepime in dextrose 5 % 1 gram/50 mL IVPB 1 g    [DISCONTINUED] dextrose 5 % and 0.45 % NaCl infusion    [DISCONTINUED] gentamicin 0.1 % ointment    [DISCONTINUED] HYDROcodone-acetaminophen 5-325 mg per tablet 1 tablet    [DISCONTINUED] lidocaine (PF) 10 mg/ml (1%) injection 10 mg     [DISCONTINUED] melatonin tablet 6 mg    [DISCONTINUED] metoclopramide HCl tablet 10 mg    [DISCONTINUED] morphine injection 4 mg    [DISCONTINUED] ondansetron disintegrating tablet 8 mg    [DISCONTINUED] ondansetron injection 4 mg    [DISCONTINUED] oxyCODONE-acetaminophen 5-325 mg per tablet 1 tablet    [DISCONTINUED] pantoprazole EC tablet 40 mg    [DISCONTINUED] sodium chloride 0.9% flush 10 mL     Current Outpatient Medications on File Prior to Encounter   Medication Sig    acetaminophen (TYLENOL) 325 MG tablet Take 2 tablets (650 mg total) by mouth every 8 (eight) hours as needed.    betamethasone valerate 0.1% (VALISONE) 0.1 % Crea Apply around wound as needed daily for itching.    blood sugar diagnostic Strp Use to test blood sugar twice daily as directed.    blood-glucose meter Misc Use as directed to test blood sugar twice daily    carvedilol (COREG) 25 MG tablet Take 1 tablet (25 mg total) by mouth once daily.    cefTRIAXone (ROCEPHIN) 1 gram injection Inject 1 g into the vein once daily. End date 7/22/2020 for 13 days    clotrimazole-betamethasone 1-0.05% (LOTRISONE) cream Apply topically locally as directed    dicyclomine (BENTYL) 10 MG capsule TAKE 1 CAPSULE BY MOUTH THREE TIMES A DAY    gentamicin (GARAMYCIN) 0.1 % ointment Apply topically to affected area daily    hydroCHLOROthiazide (HYDRODIURIL) 25 MG tablet Take 1 tablet (25 mg total) by mouth once daily.    HYDROcodone-acetaminophen (NORCO) 5-325 mg per tablet Take 1 tablet by mouth every 4 (four) hours as needed.    insulin glargine 100 units/mL (3mL) SubQ pen Inject subcutaneously into the skin 10 units every morning.    lancets 30 gauge Misc Use to test blood sugar twice daily.    metFORMIN (GLUCOPHAGE) 1000 MG tablet Take 1 tablet (1,000 mg total) by mouth 2 (two) times daily.    metoclopramide HCl (REGLAN) 10 MG tablet Take 1 tablet (10 mg total) by mouth 4 (four) times daily.    naproxen (NAPROSYN) 500 MG tablet Take 1  "tablet (500 mg total) by mouth 2 (two) times daily with meals.    ondansetron (ZOFRAN) 8 MG tablet Take 1 tablet (8 mg total) by mouth 2 (two) times daily as needed. (Patient taking differently: Take 8 mg by mouth every 8 (eight) hours as needed for Nausea. )    pen needle, diabetic (BD ULTRA-FINE ONEIL PEN NEEDLE) 32 gauge x 5/32" Ndle Use to inject insulin daily    TRUETEST TEST STRIPS Strp        Review of patient's allergies indicates:   Allergen Reactions    Chlorhexidine gluconate Rash     Chlorhexidine/alcohol wipes. Rash and blistering.       Past Medical History:   Diagnosis Date    Chronic kidney disease, stage 3     Diabetes mellitus     Diabetes mellitus, type 2     Hypertension      Past Surgical History:   Procedure Laterality Date     SECTION, CLASSIC      x2    CHOLECYSTECTOMY      COLON SURGERY      [facility]    COLONOSCOPY N/A 2018    Procedure: COLONOSCOPY;  Surgeon: Gibran Aguayo MD;  Location: [facility] ENDO;  Service: Endoscopy;  Laterality: N/A;    COLOSTOMY Left     CYSTOSCOPY WITH URETEROSCOPY, RETROGRADE PYELOGRAPHY, AND INSERTION OF STENT Left 2019    Procedure: CYSTOSCOPY, WITH RETROGRADE PYELOGRAM AND URETERAL STENT INSERTION with placement of a muir cath;  Surgeon: Ulisses Horton MD;  Location: [facility] OR;  Service: General;  Laterality: Left;    INCISION AND DRAINAGE OF ABSCESS N/A 2019    Procedure: INCISION AND DRAINAGE, ABSCESS;  Surgeon: Ulisses Horton MD;  Location: [facility] OR;  Service: General;  Laterality: N/A;    INCISION OF ABDOMINAL WALL N/A 8/10/2018    Procedure: INCISION, ABDOMINAL WALL;  Surgeon: Ulisses Horton MD;  Location: [facility] OR;  Service: General;  Laterality: N/A;    INCISIONAL HERNIA REPAIR Right      Family History     Problem Relation (Age of Onset)    Alcohol abuse Father    Diabetes Sister, Brother    Early death Grandchild    Heart disease Mother, Sister    Hyperlipidemia Mother, Sister    " Hypertension Mother, Sister    Stroke Mother, Sister        Tobacco Use    Smoking status: Former Smoker     Types: Cigarettes     Quit date: 2000     Years since quittin.5    Smokeless tobacco: Never Used   Substance and Sexual Activity    Alcohol use: No    Drug use: No    Sexual activity: Not Currently     Review of Systems   Constitutional: Negative for activity change.   Respiratory: Negative for shortness of breath.    Cardiovascular: Negative for chest pain.   Gastrointestinal: Positive for abdominal pain, nausea and vomiting. Negative for abdominal distention, blood in stool, constipation and diarrhea.   Skin: Positive for wound.   Neurological: Negative for dizziness and headaches.   Psychiatric/Behavioral: Negative for behavioral problems and confusion.     Objective:     Vital Signs (Most Recent):  Temp: 97.7 °F (36.5 °C) (07/15/20 2040)  Pulse: 71 (07/15/20 2040)  Resp: 20 (07/15/20 2312)  BP: (!) 174/95 (07/15/20 2040)  SpO2: 97 % (07/15/20 2040) Vital Signs (24h Range):  Temp:  [97.2 °F (36.2 °C)-98.5 °F (36.9 °C)] 97.7 °F (36.5 °C)  Pulse:  [64-89] 71  Resp:  [18-22] 20  SpO2:  [97 %] 97 %  BP: (118-174)/(69-95) 174/95        There is no height or weight on file to calculate BMI.    Physical Exam  Vitals signs reviewed.   Constitutional:       General: She is not in acute distress.     Appearance: Normal appearance.   HENT:      Head: Normocephalic and atraumatic.      Nose: Nose normal.   Eyes:      Extraocular Movements: Extraocular movements intact.      Pupils: Pupils are equal, round, and reactive to light.   Neck:      Musculoskeletal: Normal range of motion.   Cardiovascular:      Rate and Rhythm: Normal rate.      Pulses: Normal pulses.   Pulmonary:      Effort: Pulmonary effort is normal. No respiratory distress.   Abdominal:      General: Abdomen is flat. Bowel sounds are normal. There is no distension.      Palpations: Abdomen is soft.      Tenderness: There is abdominal  tenderness (tenderness on palpation to mid abdomen). There is no guarding or rebound (Patient has a colocutaneous fistula in the RLQ and the sub-umbilical area. Draining small amount of mucinous fluid).   Musculoskeletal: Normal range of motion.   Skin:     General: Skin is warm and dry.      Findings: No erythema.   Neurological:      General: No focal deficit present.      Mental Status: She is alert and oriented to person, place, and time.   Psychiatric:         Behavior: Behavior normal.         Significant Labs:  CBC:   Recent Labs   Lab 07/14/20  1548   WBC 6.33   RBC 4.49   HGB 10.4*   HCT 33.2*   *   MCV 74*   MCH 23.2*   MCHC 31.3*     BMP:   Recent Labs   Lab 07/09/20  0424  07/14/20  1548   *   < > 115*   *   < > 133*   K 3.1*   < > 3.0*   CL 94*   < > 91*   CO2 26   < > 28   BUN 15   < > 19   CREATININE 1.1   < > 1.0   CALCIUM 8.6*   < > 9.2   MG 1.4*  --   --     < > = values in this interval not displayed.     CMP:   Recent Labs   Lab 07/14/20  0731 07/14/20  1548   GLU 67* 115*   CALCIUM 8.8 9.2   ALBUMIN 2.9*  --    PROT 7.4  --    * 133*   K 3.3* 3.0*   CO2 26 28   CL 95 91*   BUN 20 19   CREATININE 1.0 1.0   ALKPHOS 64  --    ALT 11  --    AST 17  --    BILITOT 0.3  --      LFTs:   Recent Labs   Lab 07/14/20  0731   ALT 11   AST 17   ALKPHOS 64   BILITOT 0.3   PROT 7.4   ALBUMIN 2.9*     Microbiology Results (last 7 days)     ** No results found for the last 168 hours. **          Significant Diagnostics:  I have reviewed all pertinent imaging results/findings within the past 24 hours.    Assessment/Plan:     Patient is a 70 y.o. female with a history of  DM II, HTN, and complicated surgical history, including h/o colon resection and Aiyana procedure, takedown colostomy, colostomy closure, clot of colo cutaneous fistula -  presents with abdominal pain and vomiting. Patient has a colocutaneous fistula in the RLQ and the sub-umbilical area. Draining small amount of  mucinous fluid.  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

## 2020-07-16 NOTE — NURSING
Admit note:    Care assumed. Patient arrived via stretcher and EMS personnel AAOx4. Pt is primarily Citizen of [place] speaking, very little english spoken. Pt lying supine in bed. Pt moaning in pain. x3 episode of green emesis. Abdominal wounds with tan drainage, dressed with gauze and abdominal pads. x2 PIV - 22 LFA and 22 L wrist, flushed and saline locked. Midline to R arm flushed and saline locked - date on dressing 7/9. See assessment. Patient oriented to room. Bed in lowest position, side rails up x2, bed wheels locked and call light within reach, no acute distress noted.     Admitting team notified of pt arrival.

## 2020-07-16 NOTE — NURSING
Patient fetched by [facility] staff. Patient awake alert. GCS 15, ambulatory. Additional potassium 20 meqs syrup given prior to transfer. Midline intact, IV line intact. Comfortable in stretcher. Charge Nurse Patricia Horton for the Discharge summary.

## 2020-07-16 NOTE — CONSULTS
Double lumen PICC to left brachial vein.  39 cm in length, 31 cm arm circumference and 0 cm exposed.   Lot # [id number].

## 2020-07-16 NOTE — PLAN OF CARE
Plan of care reviewed with pt. Pt aaox4, VS as charted. Purposeful rounding for pt care and safety. Pain and nausea controlled with PRN medication. No reports of NV. No falls/injury reported this shift. Abdominal wounds with tan drainage, reinforced dressing from outside hospital. Pt verbalized understanding NPO status. SCD in place. Safety precautions maintained - bed in low position, call light in reach, side rails up x2.

## 2020-07-16 NOTE — PLAN OF CARE
Pt AAOx4 and able to call staff for assistance. Pt speaks Malay and english. Pt waiting for transport to main [facility]. Dressing changed to abdomen with green drainage from abdomen.Applied genmyacin for tx.Pt medicated with Norco and Percocet but not effective. Morphine and Zofran given. Meds effective at this time. Tolerated IV ABT with 0 ase noted. Call light within reach.

## 2020-07-16 NOTE — PROCEDURES
"Azucena Morrison is a 70 y.o. female patient.    Temp: 96.2 °F (35.7 °C) (07/16/20 0729)  Pulse: 66 (07/16/20 0729)  Resp: 16 (07/16/20 0511)  BP: 130/78 (07/16/20 0729)  SpO2: 99 % (07/16/20 0729)  Weight: 70.7 kg (155 lb 13.8 oz) (07/16/20 0014)  Height: 5' 1" (154.9 cm) (07/15/20 2040)    PICC  Date/Time: 7/16/2020 10:37 AM  Performed by: Pinky Ortiz RN  Assisting provider: Ezra Jones RN  Consent Done: Yes  Time out: Immediately prior to procedure a time out was called to verify the correct patient, procedure, equipment, support staff and site/side marked as required  Indications: vascular access and med administration  Anesthesia: local infiltration  Local anesthetic: lidocaine 1% without epinephrine  Anesthetic Total (mL): 3  Description of findings: PICC  Preparation: skin prepped with ChloraPrep  Skin prep agent dried: skin prep agent completely dried prior to procedure  Sterile barriers: all five maximum sterile barriers used - cap, mask, sterile gown, sterile gloves, and large sterile sheet  Hand hygiene: hand hygiene performed prior to central venous catheter insertion  Location details: left brachial  Catheter type: double lumen  Catheter size: 5 Fr  Catheter Length: 39cm    Ultrasound guidance: yes  Vessel Caliber: medium and large, compressibility normal  Vascular Doppler: not done  Needle advanced into vessel with real time Ultrasound guidance.  Guidewire confirmed in vessel.  Image recorded and saved.  Sterile sheath used.  Number of attempts: 1  Post-procedure: blood return through all ports, chlorhexidine patch and sterile dressing applied  Technical procedures used: 3CG  Specimens: No  Implants: No          Ezra Jones  7/16/2020    "

## 2020-07-16 NOTE — DISCHARGE SUMMARY
[facility]  General Surgery  Discharge Summary      Patient Name: Azucena Morrison  MRN: [id number]  Admission Date: 7/13/2020  Hospital Length of Stay: 1 days  Discharge Date and Time: 7/15/2020  8:30 PM  Attending Physician: Molly att. providers found   Discharging Provider: Ulisses Horton MD  Primary Care Provider: Pj Sanches MD     HPI:  A 70-year-old female known diabetic hypertensive status post multiple abdominal surgeries for recurrent diverticulitis status post colon resection and colostomy colostomy reversal colocutaneous fistula small-bowel obstruction abdominal wall abscess admitted with recurrent abdominal pain associated nausea and vomiting unable to keep any food down patient was referred to me nausea noted patient got admitted last week and may nausea and was discharged on IV antibiotics to be followed by the surgeon and rectal:  As an outpatient but patient presented here again in [facility] patient id was admitted because no bed was available at [facility] nausea patient was continued on NPO status with IV fluids and given pain medicine does come continued under suffered prox and for IV antibiotics      Hospital Course:  Patient admitted to the hospital for observation for recurrent abdominal pain associated nausea and vomiting patient was having bowel movement had the no blood in the stool patient was awaiting being transferred to [facility] for further care of colo rectal:  Colonic and abdominal wall fistula patient was started back on on suffered tracks and which was recommended and patient was discharged from a nausea up by Dr. Knight patient discussion was made with colorectal surgeons at [facility] and excepted and because of nonavailability of the bed patient was observed in [place] patient was given pain medicine and IV antibiotics was given liquid diet complain the vomiting again patient ultimately got an after bed available at was discharged on 715 at 8:00 a.m. 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  Medications:  Reconciled Home Medications:      Medication List      CHANGE how you take these medications    ondansetron 8 MG tablet  Commonly known as: ZOFRAN  Take 1 tablet (8 mg total) by mouth 2 (two) times daily as needed.  What changed:   · when to take this  · reasons to take this        CONTINUE taking these medications    acetaminophen 325 MG tablet  Commonly known as: TYLENOL  Take 2 tablets (650 mg total) by mouth every 8 (eight) hours as needed.     BD ULTRA-FINE ONEIL PEN NEEDLE 32 gauge x 5/32" Ndle  Generic drug: pen needle, diabetic  Use to inject insulin daily     betamethasone valerate 0.1% 0.1 % Crea  Commonly known as: VALISONE  Apply around wound as needed daily for itching.     carvediloL 25 MG tablet  Commonly known as: COREG  [place] tre tableta (25 mg en total) por vía oral diariamente.  (Take 1 tablet (25 mg total) by mouth once daily.)     cefTRIAXone 1 gram injection  Commonly known as: ROCEPHIN  Inject 1 g into the vein once daily. End date " 7/22/2020 for 13 days     clotrimazole-betamethasone 1-0.05% cream  Commonly known as: LOTRISONE  Apply topically locally as directed     dicyclomine 10 MG capsule  Commonly known as: BENTYL  TAKE 1 CAPSULE BY MOUTH THREE TIMES A DAY     EASY TOUCH TWIST LANCETS 30 gauge Misc  Generic drug: lancets  Use to test blood sugar twice daily.     gentamicin 0.1 % ointment  Commonly known as: GARAMYCIN  Aplique de manera tópica en el área afectada diariamente  (Apply topically to affected area daily)     hydroCHLOROthiazide 25 MG tablet  Commonly known as: HYDRODIURIL  [employer] tre tableta (25 mg en total) por vía oral diariamente.  (Take 1 tablet (25 mg total) by mouth once daily.)     HYDROcodone-acetaminophen 5-325 mg per tablet  Commonly known as: NORCO  Take 1 tablet by mouth every 4 (four) hours as needed.     LANTUS SOLOSTAR U-100 INSULIN glargine 100 units/mL (3mL) SubQ pen  Generic drug: insulin  Inject subcutaneously into the skin 10 units every morning.     metFORMIN 1000 MG tablet  Commonly known as: GLUCOPHAGE  Take 1 tablet (1,000 mg total) by mouth 2 (two) times daily.     metoclopramide HCl 10 MG tablet  Commonly known as: REGLAN  Take 1 tablet (10 mg total) by mouth 4 (four) times daily.     naproxen 500 MG tablet  Commonly known as: NAPROSYN  [employer] tre tableta (500 mg en total) por vía oral 2 veces al día con las comidas.  (Take 1 tablet (500 mg total) by mouth 2 (two) times daily with meals.)     TRUE METRIX GLUCOSE METER Misc  Generic drug: blood-glucose meter  Use as directed to test blood sugar twice daily     * TRUETEST TEST STRIPS Strp  Generic drug: blood sugar diagnostic     * TRUE METRIX GLUCOSE TEST STRIP Strp  Generic drug: blood sugar diagnostic  Use to test blood sugar twice daily as directed.         * This list has 2 medication(s) that are the same as other medications prescribed for you. Read the directions carefully, and ask your doctor or other care provider to review them with you.                 Ulisses Horton MD  General Surgery  [facility]

## 2020-07-17 LAB
ALBUMIN SERPL BCP-MCNC: 2.8 G/DL (ref 3.5–5.2)
ALP SERPL-CCNC: 64 U/L (ref 55–135)
ALT SERPL W/O P-5'-P-CCNC: 10 U/L (ref 10–44)
ANION GAP SERPL CALC-SCNC: 8 MMOL/L (ref 8–16)
AST SERPL-CCNC: 15 U/L (ref 10–40)
BASOPHILS # BLD AUTO: 0.03 K/UL (ref 0–0.2)
BASOPHILS NFR BLD: 0.4 % (ref 0–1.9)
BILIRUB SERPL-MCNC: 0.2 MG/DL (ref 0.1–1)
BUN SERPL-MCNC: 6 MG/DL (ref 8–23)
CALCIUM SERPL-MCNC: 8.8 MG/DL (ref 8.7–10.5)
CHLORIDE SERPL-SCNC: 99 MMOL/L (ref 95–110)
CO2 SERPL-SCNC: 28 MMOL/L (ref 23–29)
CREAT SERPL-MCNC: 0.9 MG/DL (ref 0.5–1.4)
DIFFERENTIAL METHOD: ABNORMAL
EOSINOPHIL # BLD AUTO: 0.2 K/UL (ref 0–0.5)
EOSINOPHIL NFR BLD: 3.1 % (ref 0–8)
ERYTHROCYTE [DISTWIDTH] IN BLOOD BY AUTOMATED COUNT: 16.2 % (ref 11.5–14.5)
EST. GFR  (AFRICAN AMERICAN): >60 ML/MIN/1.73 M^2
EST. GFR  (NON AFRICAN AMERICAN): >60 ML/MIN/1.73 M^2
GLUCOSE SERPL-MCNC: 132 MG/DL (ref 70–110)
HCT VFR BLD AUTO: 29.9 % (ref 37–48.5)
HGB BLD-MCNC: 9.3 G/DL (ref 12–16)
IMM GRANULOCYTES # BLD AUTO: 0.01 K/UL (ref 0–0.04)
IMM GRANULOCYTES NFR BLD AUTO: 0.1 % (ref 0–0.5)
LYMPHOCYTES # BLD AUTO: 1.9 K/UL (ref 1–4.8)
LYMPHOCYTES NFR BLD: 26.1 % (ref 18–48)
MAGNESIUM SERPL-MCNC: 1.6 MG/DL (ref 1.6–2.6)
MCH RBC QN AUTO: 23.5 PG (ref 27–31)
MCHC RBC AUTO-ENTMCNC: 31.1 G/DL (ref 32–36)
MCV RBC AUTO: 76 FL (ref 82–98)
MONOCYTES # BLD AUTO: 0.5 K/UL (ref 0.3–1)
MONOCYTES NFR BLD: 7 % (ref 4–15)
NEUTROPHILS # BLD AUTO: 4.6 K/UL (ref 1.8–7.7)
NEUTROPHILS NFR BLD: 63.3 % (ref 38–73)
NRBC BLD-RTO: 0 /100 WBC
PHOSPHATE SERPL-MCNC: 2.4 MG/DL (ref 2.7–4.5)
PLATELET # BLD AUTO: 417 K/UL (ref 150–350)
PMV BLD AUTO: 8.4 FL (ref 9.2–12.9)
POCT GLUCOSE: 145 MG/DL (ref 70–110)
POCT GLUCOSE: 161 MG/DL (ref 70–110)
POCT GLUCOSE: 162 MG/DL (ref 70–110)
POCT GLUCOSE: 175 MG/DL (ref 70–110)
POCT GLUCOSE: 186 MG/DL (ref 70–110)
POTASSIUM SERPL-SCNC: 3.4 MMOL/L (ref 3.5–5.1)
PROT SERPL-MCNC: 7.4 G/DL (ref 6–8.4)
RBC # BLD AUTO: 3.96 M/UL (ref 4–5.4)
SODIUM SERPL-SCNC: 135 MMOL/L (ref 136–145)
TRIGL SERPL-MCNC: 371 MG/DL (ref 30–150)
WBC # BLD AUTO: 7.31 K/UL (ref 3.9–12.7)

## 2020-07-17 PROCEDURE — 63600175 PHARM REV CODE 636 W HCPCS: Performed by: NURSE PRACTITIONER

## 2020-07-17 PROCEDURE — 25000003 PHARM REV CODE 250: Performed by: NURSE PRACTITIONER

## 2020-07-17 PROCEDURE — 25000003 PHARM REV CODE 250: Performed by: COLON & RECTAL SURGERY

## 2020-07-17 PROCEDURE — 83735 ASSAY OF MAGNESIUM: CPT

## 2020-07-17 PROCEDURE — A4216 STERILE WATER/SALINE, 10 ML: HCPCS | Performed by: COLON & RECTAL SURGERY

## 2020-07-17 PROCEDURE — 84100 ASSAY OF PHOSPHORUS: CPT

## 2020-07-17 PROCEDURE — 63600175 PHARM REV CODE 636 W HCPCS: Performed by: STUDENT IN AN ORGANIZED HEALTH CARE EDUCATION/TRAINING PROGRAM

## 2020-07-17 PROCEDURE — 85025 COMPLETE CBC W/AUTO DIFF WBC: CPT

## 2020-07-17 PROCEDURE — A4217 STERILE WATER/SALINE, 500 ML: HCPCS | Performed by: NURSE PRACTITIONER

## 2020-07-17 PROCEDURE — 25000003 PHARM REV CODE 250: Performed by: STUDENT IN AN ORGANIZED HEALTH CARE EDUCATION/TRAINING PROGRAM

## 2020-07-17 PROCEDURE — 84478 ASSAY OF TRIGLYCERIDES: CPT

## 2020-07-17 PROCEDURE — 97802 MEDICAL NUTRITION INDIV IN: CPT

## 2020-07-17 PROCEDURE — 80053 COMPREHEN METABOLIC PANEL: CPT

## 2020-07-17 PROCEDURE — 11000001 HC ACUTE MED/SURG PRIVATE ROOM

## 2020-07-17 PROCEDURE — B4185 PARENTERAL SOL 10 GM LIPIDS: HCPCS | Performed by: NURSE PRACTITIONER

## 2020-07-17 RX ADMIN — MAGNESIUM SULFATE HEPTAHYDRATE: 500 INJECTION, SOLUTION INTRAMUSCULAR; INTRAVENOUS at 09:07

## 2020-07-17 RX ADMIN — HYDROMORPHONE HYDROCHLORIDE 0.8 MG: 1 INJECTION, SOLUTION INTRAMUSCULAR; INTRAVENOUS; SUBCUTANEOUS at 02:07

## 2020-07-17 RX ADMIN — ONDANSETRON 8 MG: 2 INJECTION INTRAMUSCULAR; INTRAVENOUS at 02:07

## 2020-07-17 RX ADMIN — SOYBEAN OIL 250 ML: 20 INJECTION, SOLUTION INTRAVENOUS at 09:07

## 2020-07-17 RX ADMIN — HYDROMORPHONE HYDROCHLORIDE 0.8 MG: 1 INJECTION, SOLUTION INTRAMUSCULAR; INTRAVENOUS; SUBCUTANEOUS at 06:07

## 2020-07-17 RX ADMIN — Medication 10 ML: at 06:07

## 2020-07-17 RX ADMIN — ENOXAPARIN SODIUM 40 MG: 100 INJECTION SUBCUTANEOUS at 05:07

## 2020-07-17 RX ADMIN — HYDROMORPHONE HYDROCHLORIDE 0.5 MG: 1 INJECTION, SOLUTION INTRAMUSCULAR; INTRAVENOUS; SUBCUTANEOUS at 11:07

## 2020-07-17 RX ADMIN — HYDROMORPHONE HYDROCHLORIDE 0.8 MG: 1 INJECTION, SOLUTION INTRAMUSCULAR; INTRAVENOUS; SUBCUTANEOUS at 08:07

## 2020-07-17 RX ADMIN — ONDANSETRON 8 MG: 2 INJECTION INTRAMUSCULAR; INTRAVENOUS at 08:07

## 2020-07-17 RX ADMIN — Medication 10 ML: at 05:07

## 2020-07-17 RX ADMIN — PROMETHAZINE HYDROCHLORIDE 6.25 MG: 25 INJECTION INTRAMUSCULAR; INTRAVENOUS at 05:07

## 2020-07-17 RX ADMIN — CARVEDILOL 25 MG: 25 TABLET, FILM COATED ORAL at 11:07

## 2020-07-17 RX ADMIN — Medication 10 ML: at 12:07

## 2020-07-17 RX ADMIN — HYDROMORPHONE HYDROCHLORIDE 0.8 MG: 1 INJECTION, SOLUTION INTRAMUSCULAR; INTRAVENOUS; SUBCUTANEOUS at 11:07

## 2020-07-17 RX ADMIN — HYDROMORPHONE HYDROCHLORIDE 0.5 MG: 1 INJECTION, SOLUTION INTRAMUSCULAR; INTRAVENOUS; SUBCUTANEOUS at 02:07

## 2020-07-17 NOTE — PROGRESS NOTES
[facility]  Colorectal Surgery  Progress Note    Patient Name: Azucena Morrison  MRN: [id number]  Admission Date: 7/15/2020  Hospital Length of Stay: 2 days  Attending Physician: Fabiana Valiente MD    Subjective:     Interval History: N/v overnight. NG inserted this AM.    Post-Op Info:  Procedure(s) (LRB):  RESECTION, COLON, LOW ANTERIOR, possible loop ileostomy, ERAS high (N/A)          Medications:  Continuous Infusions:   TPN ADULT CENTRAL LINE CUSTOM 75 mL/hr at 07/16/20 2207     Scheduled Meds:   carvediloL  25 mg Oral Daily    enoxaparin  40 mg Subcutaneous Q24H    sodium chloride 0.9%  10 mL Intravenous Q6H     PRN Meds:   dextrose 50%    dextrose 50%    glucagon (human recombinant)    glucagon (human recombinant)    HYDROmorphone    HYDROmorphone    insulin aspart U-100    insulin aspart U-100    ondansetron    promethazine (PHENERGAN) IVPB    sodium chloride 0.9%        Objective:     Vital Signs (Most Recent):  Temp: 97.5 °F (36.4 °C) (07/17/20 0704)  Pulse: 74 (07/17/20 0704)  Resp: 18 (07/17/20 0815)  BP: 125/85 (07/17/20 0704)  SpO2: 98 % (07/17/20 0704) Vital Signs (24h Range):  Temp:  [96.9 °F (36.1 °C)-98 °F (36.7 °C)] 97.5 °F (36.4 °C)  Pulse:  [74-80] 74  Resp:  [10-19] 18  SpO2:  [97 %-99 %] 98 %  BP: (119-140)/(74-87) 125/85     Intake/Output - Last 3 Shifts       07/15 0700 - 07/16 0659 07/16 0700 - 07/17 0659 07/17 0700 - 07/18 0659    P.O.  600     Total Intake(mL/kg)  600 (8.5)     Urine (mL/kg/hr)  1500 (0.9)     Emesis/NG output  300     Stool  0     Total Output  1800     Net  -1200            Urine Occurrence  4 x     Stool Occurrence  0 x     Emesis Occurrence  4 x           Physical Exam  Constitutional:       General: She is not in acute distress.     Appearance: She is well-developed.   Eyes:      Conjunctiva/sclera: Conjunctivae normal.   Pulmonary:      Effort: Pulmonary effort is normal. No respiratory distress.   Abdominal:      General: There  is no distension.      Palpations: Abdomen is soft.      Tenderness: There is no abdominal tenderness.   Musculoskeletal: Normal range of motion.   Skin:     General: Skin is warm and dry.   Neurological:      Mental Status: She is alert and oriented to person, place, and time.   Psychiatric:         Behavior: Behavior normal.       Significant Labs:  BMP (Last 3 Results):   Recent Labs   Lab 07/15/20  2322 07/16/20  0320 07/17/20  0302   * 151* 132*   * 135* 135*   K 3.3* 3.7 3.4*   CL 97 95 99   CO2 28 29 28   BUN 5* 6* 6*   CREATININE 0.9 1.0 0.9   CALCIUM 9.6 9.2 8.8   MG  --  1.6 1.6     CBC (Last 3 Results):   Recent Labs   Lab 07/15/20  2322 07/16/20  0320 07/17/20  0302   WBC 11.13 10.04 7.31   RBC 4.56 4.38 3.96*   HGB 10.5* 10.1* 9.3*   HCT 33.4* 32.4* 29.9*   * 473* 417*   MCV 73* 74* 76*   MCH 23.0* 23.1* 23.5*   MCHC 31.4* 31.2* 31.1*     CMP (Last 3 Results):   Recent Labs   Lab 07/15/20  2322 07/16/20  0320 07/17/20  0302   * 151* 132*   CALCIUM 9.6 9.2 8.8   ALBUMIN 3.3* 3.0* 2.8*   PROT 8.2 7.7 7.4   * 135* 135*   K 3.3* 3.7 3.4*   CO2 28 29 28   CL 97 95 99   BUN 5* 6* 6*   CREATININE 0.9 1.0 0.9   ALKPHOS 79 67 64   ALT 12 10 10   AST 19 19 15   BILITOT 0.3 0.2 0.2       Significant Diagnostics:  None    Assessment/Plan:     * Cutaneous fistula  Surgical intervention potentially planned for later in month  Continue TPN  NG to LIMS  Pain control  IVF    Chronic kidney disease, stage 3  Monitor labs    Essential hypertension  Monitor BP  Home meds    Type 2 diabetes mellitus without complication, with long-term current use of insulin  SSI  Accu check Q6          Suzette Vargas NP  Colorectal Surgery  [facility]

## 2020-07-17 NOTE — PLAN OF CARE
Problem: Parenteral Nutrition  Goal: Effective Intravenous Nutrition Therapy Delivery  Outcome: Ongoing, Progressing   Recommendations    Recommendation:  1. Continue TPN of 90 g AA, 270 g dextrose + IV lipids, to provide pt with 1778 kcal total, 90 g protein and GIR of 2.65.    -Check triglycerides weekly.  2. ADAT to diabetic diet texture per SLP   3.Suggest adding Vitamin C, MVI and zinc to aid in wound healing    RD to monitor and follow up    Goals: pt to tolerate >85% of EEN/EPN by RD follow up  Nutrition Goal Status: new  Communication of RD Recs: other (comment)(POC)

## 2020-07-17 NOTE — PROGRESS NOTES
"[facility]  Adult Nutrition  Progress Note    SUMMARY       Recommendations    Recommendation:  1. Continue TPN of 90 g AA, 270 g dextrose + IV lipids, to provide pt with 1778 kcal total, 90 g protein and GIR of 2.65.    -Check triglycerides weekly.  2. ADAT to diabetic diet texture per SLP   3.Suggest adding Vitamin C, MVI and zinc to aid in wound healing    RD to monitor and follow up    Goals: pt to tolerate >85% of EEN/EPN by RD follow up  Nutrition Goal Status: new  Communication of RD Recs: other (comment)(POC)    Reason for Assessment    Reason For Assessment: new TPN  Diagnosis: (cutaneous fistula)  Relevant Medical History: DM; HTN; CKD3  Interdisciplinary Rounds: did not attend  General Information Comments: Pt resting in bed. TPN infusing @ 75 mL/hr. Pt reported poor PO intake PTA x 1 week due to N/V and wt loss of ~40 lbs. Unsure of time frame or UBW. Chart shows UBW ~165 lbs x 2 years, resulting in 10 lbs wt loss. NFPE completed, pt with mild/moderate muscle and fat wasting, meets criteria for moderate malnutrition at this time.  Nutrition Discharge Planning: unable to determine    Nutrition Risk Screen    Nutrition Risk Screen: large or nonhealing wound, burn or pressure injury    Nutrition/Diet History    Food Allergies: NKFA  Factors Affecting Nutritional Intake: NPO, decreased appetite, nausea/vomiting    Anthropometrics    Temp: 97.5 °F (36.4 °C)  Height Method: Stated  Height: 5' 1" (154.9 cm)  Height (inches): 61 in  Weight Method: Bed Scale  Weight: 70.7 kg (155 lb 13.8 oz)  Weight (lb): 155.87 lb  Ideal Body Weight (IBW), Female: 105 lb  % Ideal Body Weight, Female (lb): 148.45 %  BMI (Calculated): 29.5  BMI Grade: 25 - 29.9 - overweight       Lab/Procedures/Meds    Pertinent Labs Reviewed: reviewed  Pertinent Labs Comments: Na 135; K 3.4; Glucose 132; Phos 2.4; BUN 6  Pertinent Medications Reviewed: reviewed  Pertinent Medications Comments: carvedilol; enoxaparin "     Estimated/Assessed Needs    Weight Used For Calorie Calculations: 70.7 kg (155 lb 13.8 oz)  Energy Need Method: Henry-St Jeor(x1.2)  Protein Requirements: 85-99 g/day  Weight Used For Protein Calculations: 70.7 kg (155 lb 13.8 oz)  Fluid Requirements (mL): 1 mL/kcal or per MD  CHO Requirement: 175      Nutrition Prescription Ordered    Current Diet Order: NPO  Current Nutrition Support Formula Ordered: Other (Comment)(TPN @ 75; 90 AA +270 dex + lipids)    Evaluation of Received Nutrient/Fluid Intake    Parenteral Calories (kcal): 1278  Parenteral Protein (gm): 90  Lipid Calories (kcals): 500 kcals  Total Calories (kcal): 1778  % Kcal Needs: 126  % Protein Needs: 100  I/O: -1.2 L since admit  Energy Calories Required: meeting needs  Protein Required: meeting needs  Fluid Required: meeting needs  Comments: LBM 7/14  Tolerance: tolerating  % Intake of Estimated Energy Needs: 75 - 100 %  % Meal Intake: NPO    Nutrition Risk    Level of Risk/Frequency of Follow-up: high     Assessment and Plan    Nutrition Problem:   Moderate Protein-Calorie Malnutrition  Malnutrition in the context of Acute Illness/Injury    Related to (etiology):  Decreased intake     Signs and Symptoms (as evidenced by):  Energy Intake: <75% of estimated energy requirement for >1 week  Body Fat Depletion: mild depletion of triceps   Muscle Mass Depletion: mild and moderate depletion of temples, clavicle region, interosseous muscle and lower extremities   Weight Loss: 6% x unsure of time frame per pt     Interventions(treatment strategy):  Collaboration of care with other providers  Parenteral nutrition    Nutrition Diagnosis Status:  New     Monitor and Evaluation    Food and Nutrient Intake: parenteral nutrition intake  Food and Nutrient Adminstration: enteral and parenteral nutrition administration  Anthropometric Measurements: weight, weight change  Biochemical Data, Medical Tests and Procedures: electrolyte and renal panel, gastrointestinal  profile, glucose/endocrine profile, lipid profile, inflammatory profile  Nutrition-Focused Physical Findings: overall appearance     Malnutrition Assessment  Malnutrition Type: acute illness or injury  Weight Loss (Malnutrition): (6% unsure of time frame)  Energy Intake (Malnutrition): less than 75% for greater than 7 days  Subcutaneous Fat (Malnutrition): moderate depletion  Muscle Mass (Malnutrition): moderate depletion   Orbital Region (Subcutaneous Fat Loss): mild depletion  Upper Arm Region (Subcutaneous Fat Loss): moderate depletion   [place] Region (Muscle Loss): mild depletion  Clavicle Bone Region (Muscle Loss): mild depletion  Clavicle and Acromion Bone Region (Muscle Loss): mild depletion  Dorsal Hand (Muscle Loss): mild depletion  Anterior Thigh Region (Muscle Loss): moderate depletion  Posterior Calf Region (Muscle Loss): moderate depletion   Edema (Fluid Accumulation): 0-->no edema present     Nutrition Follow-Up    RD Follow-up?: Yes

## 2020-07-17 NOTE — ASSESSMENT & PLAN NOTE
Surgical intervention potentially planned for later in month  Continue TPN  NG to LIMS  Pain control  IVF

## 2020-07-17 NOTE — SUBJECTIVE & OBJECTIVE
Subjective:     Interval History: N/v overnight. NG inserted this AM.    Post-Op Info:  Procedure(s) (LRB):  RESECTION, COLON, LOW ANTERIOR, possible loop ileostomy, ERAS high (N/A)          Medications:  Continuous Infusions:   TPN ADULT CENTRAL LINE CUSTOM 75 mL/hr at 07/16/20 2207     Scheduled Meds:   carvediloL  25 mg Oral Daily    enoxaparin  40 mg Subcutaneous Q24H    sodium chloride 0.9%  10 mL Intravenous Q6H     PRN Meds:   dextrose 50%    dextrose 50%    glucagon (human recombinant)    glucagon (human recombinant)    HYDROmorphone    HYDROmorphone    insulin aspart U-100    insulin aspart U-100    ondansetron    promethazine (PHENERGAN) IVPB    sodium chloride 0.9%        Objective:     Vital Signs (Most Recent):  Temp: 97.5 °F (36.4 °C) (07/17/20 0704)  Pulse: 74 (07/17/20 0704)  Resp: 18 (07/17/20 0815)  BP: 125/85 (07/17/20 0704)  SpO2: 98 % (07/17/20 0704) Vital Signs (24h Range):  Temp:  [96.9 °F (36.1 °C)-98 °F (36.7 °C)] 97.5 °F (36.4 °C)  Pulse:  [74-80] 74  Resp:  [10-19] 18  SpO2:  [97 %-99 %] 98 %  BP: (119-140)/(74-87) 125/85     Intake/Output - Last 3 Shifts       07/15 0700 - 07/16 0659 07/16 0700 - 07/17 0659 07/17 0700 - 07/18 0659    P.O.  600     Total Intake(mL/kg)  600 (8.5)     Urine (mL/kg/hr)  1500 (0.9)     Emesis/NG output  300     Stool  0     Total Output  1800     Net  -1200            Urine Occurrence  4 x     Stool Occurrence  0 x     Emesis Occurrence  4 x           Physical Exam  Constitutional:       General: She is not in acute distress.     Appearance: She is well-developed.   Eyes:      Conjunctiva/sclera: Conjunctivae normal.   Pulmonary:      Effort: Pulmonary effort is normal. No respiratory distress.   Abdominal:      General: There is no distension.      Palpations: Abdomen is soft.      Tenderness: There is no abdominal tenderness.   Musculoskeletal: Normal range of motion.   Skin:     General: Skin is warm and dry.   Neurological:      Mental  Status: She is alert and oriented to person, place, and time.   Psychiatric:         Behavior: Behavior normal.       Significant Labs:  BMP (Last 3 Results):   Recent Labs   Lab 07/15/20  2322 07/16/20  0320 07/17/20  0302   * 151* 132*   * 135* 135*   K 3.3* 3.7 3.4*   CL 97 95 99   CO2 28 29 28   BUN 5* 6* 6*   CREATININE 0.9 1.0 0.9   CALCIUM 9.6 9.2 8.8   MG  --  1.6 1.6     CBC (Last 3 Results):   Recent Labs   Lab 07/15/20  2322 07/16/20  0320 07/17/20  0302   WBC 11.13 10.04 7.31   RBC 4.56 4.38 3.96*   HGB 10.5* 10.1* 9.3*   HCT 33.4* 32.4* 29.9*   * 473* 417*   MCV 73* 74* 76*   MCH 23.0* 23.1* 23.5*   MCHC 31.4* 31.2* 31.1*     CMP (Last 3 Results):   Recent Labs   Lab 07/15/20  2322 07/16/20  0320 07/17/20  0302   * 151* 132*   CALCIUM 9.6 9.2 8.8   ALBUMIN 3.3* 3.0* 2.8*   PROT 8.2 7.7 7.4   * 135* 135*   K 3.3* 3.7 3.4*   CO2 28 29 28   CL 97 95 99   BUN 5* 6* 6*   CREATININE 0.9 1.0 0.9   ALKPHOS 79 67 64   ALT 12 10 10   AST 19 19 15   BILITOT 0.3 0.2 0.2       Significant Diagnostics:  None

## 2020-07-17 NOTE — PLAN OF CARE
Plan of care reviewed with pt. Pt aaox4, VS as charted. Purposeful rounding for pt care and safety. Pain and nausea controlled with PRN medication. x3 episodes of emesis - green in color. No falls/injury reported this shift. Abdominal wound dressing intact. TPN and Lipids initiated as ordered, pt tolerating well. SCD in place. Safety precautions maintained - bed in low position, call light in reach, side rails up x2.

## 2020-07-18 LAB
ALBUMIN SERPL BCP-MCNC: 2.7 G/DL (ref 3.5–5.2)
ALP SERPL-CCNC: 65 U/L (ref 55–135)
ALT SERPL W/O P-5'-P-CCNC: 10 U/L (ref 10–44)
ANION GAP SERPL CALC-SCNC: 10 MMOL/L (ref 8–16)
ANION GAP SERPL CALC-SCNC: 10 MMOL/L (ref 8–16)
AST SERPL-CCNC: 13 U/L (ref 10–40)
BASOPHILS # BLD AUTO: 0.03 K/UL (ref 0–0.2)
BASOPHILS NFR BLD: 0.3 % (ref 0–1.9)
BILIRUB SERPL-MCNC: 0.2 MG/DL (ref 0.1–1)
BUN SERPL-MCNC: 16 MG/DL (ref 8–23)
BUN SERPL-MCNC: 16 MG/DL (ref 8–23)
CALCIUM SERPL-MCNC: 8.7 MG/DL (ref 8.7–10.5)
CALCIUM SERPL-MCNC: 8.7 MG/DL (ref 8.7–10.5)
CHLORIDE SERPL-SCNC: 99 MMOL/L (ref 95–110)
CHLORIDE SERPL-SCNC: 99 MMOL/L (ref 95–110)
CO2 SERPL-SCNC: 25 MMOL/L (ref 23–29)
CO2 SERPL-SCNC: 25 MMOL/L (ref 23–29)
CREAT SERPL-MCNC: 0.9 MG/DL (ref 0.5–1.4)
CREAT SERPL-MCNC: 0.9 MG/DL (ref 0.5–1.4)
DIFFERENTIAL METHOD: ABNORMAL
EOSINOPHIL # BLD AUTO: 0.2 K/UL (ref 0–0.5)
EOSINOPHIL NFR BLD: 2.7 % (ref 0–8)
ERYTHROCYTE [DISTWIDTH] IN BLOOD BY AUTOMATED COUNT: 16.7 % (ref 11.5–14.5)
EST. GFR  (AFRICAN AMERICAN): >60 ML/MIN/1.73 M^2
EST. GFR  (AFRICAN AMERICAN): >60 ML/MIN/1.73 M^2
EST. GFR  (NON AFRICAN AMERICAN): >60 ML/MIN/1.73 M^2
EST. GFR  (NON AFRICAN AMERICAN): >60 ML/MIN/1.73 M^2
GLUCOSE SERPL-MCNC: 136 MG/DL (ref 70–110)
GLUCOSE SERPL-MCNC: 136 MG/DL (ref 70–110)
HCT VFR BLD AUTO: 31.6 % (ref 37–48.5)
HGB BLD-MCNC: 9.8 G/DL (ref 12–16)
IMM GRANULOCYTES # BLD AUTO: 0.02 K/UL (ref 0–0.04)
IMM GRANULOCYTES NFR BLD AUTO: 0.2 % (ref 0–0.5)
LYMPHOCYTES # BLD AUTO: 1.8 K/UL (ref 1–4.8)
LYMPHOCYTES NFR BLD: 20.6 % (ref 18–48)
MAGNESIUM SERPL-MCNC: 1.8 MG/DL (ref 1.6–2.6)
MCH RBC QN AUTO: 23.6 PG (ref 27–31)
MCHC RBC AUTO-ENTMCNC: 31 G/DL (ref 32–36)
MCV RBC AUTO: 76 FL (ref 82–98)
MONOCYTES # BLD AUTO: 0.6 K/UL (ref 0.3–1)
MONOCYTES NFR BLD: 7.2 % (ref 4–15)
NEUTROPHILS # BLD AUTO: 5.9 K/UL (ref 1.8–7.7)
NEUTROPHILS NFR BLD: 69 % (ref 38–73)
NRBC BLD-RTO: 0 /100 WBC
PHOSPHATE SERPL-MCNC: 2.6 MG/DL (ref 2.7–4.5)
PLATELET # BLD AUTO: 424 K/UL (ref 150–350)
PMV BLD AUTO: 8.7 FL (ref 9.2–12.9)
POCT GLUCOSE: 160 MG/DL (ref 70–110)
POCT GLUCOSE: 167 MG/DL (ref 70–110)
POTASSIUM SERPL-SCNC: 3.5 MMOL/L (ref 3.5–5.1)
POTASSIUM SERPL-SCNC: 3.5 MMOL/L (ref 3.5–5.1)
PROT SERPL-MCNC: 7.8 G/DL (ref 6–8.4)
RBC # BLD AUTO: 4.15 M/UL (ref 4–5.4)
SODIUM SERPL-SCNC: 134 MMOL/L (ref 136–145)
SODIUM SERPL-SCNC: 134 MMOL/L (ref 136–145)
WBC # BLD AUTO: 8.62 K/UL (ref 3.9–12.7)

## 2020-07-18 PROCEDURE — 25000003 PHARM REV CODE 250: Performed by: STUDENT IN AN ORGANIZED HEALTH CARE EDUCATION/TRAINING PROGRAM

## 2020-07-18 PROCEDURE — 84100 ASSAY OF PHOSPHORUS: CPT

## 2020-07-18 PROCEDURE — 63600175 PHARM REV CODE 636 W HCPCS: Performed by: STUDENT IN AN ORGANIZED HEALTH CARE EDUCATION/TRAINING PROGRAM

## 2020-07-18 PROCEDURE — 63600175 PHARM REV CODE 636 W HCPCS: Performed by: NURSE PRACTITIONER

## 2020-07-18 PROCEDURE — 11000001 HC ACUTE MED/SURG PRIVATE ROOM

## 2020-07-18 PROCEDURE — 83735 ASSAY OF MAGNESIUM: CPT

## 2020-07-18 PROCEDURE — A4216 STERILE WATER/SALINE, 10 ML: HCPCS | Performed by: COLON & RECTAL SURGERY

## 2020-07-18 PROCEDURE — 80053 COMPREHEN METABOLIC PANEL: CPT

## 2020-07-18 PROCEDURE — A4217 STERILE WATER/SALINE, 500 ML: HCPCS | Performed by: STUDENT IN AN ORGANIZED HEALTH CARE EDUCATION/TRAINING PROGRAM

## 2020-07-18 PROCEDURE — 85025 COMPLETE CBC W/AUTO DIFF WBC: CPT

## 2020-07-18 PROCEDURE — 25000003 PHARM REV CODE 250: Performed by: COLON & RECTAL SURGERY

## 2020-07-18 RX ORDER — DEXTROSE MONOHYDRATE, SODIUM CHLORIDE, AND POTASSIUM CHLORIDE 50; 1.49; 4.5 G/1000ML; G/1000ML; G/1000ML
INJECTION, SOLUTION INTRAVENOUS CONTINUOUS
Status: DISCONTINUED | OUTPATIENT
Start: 2020-07-18 | End: 2020-07-18

## 2020-07-18 RX ADMIN — Medication 10 ML: at 12:07

## 2020-07-18 RX ADMIN — HYDROMORPHONE HYDROCHLORIDE 0.8 MG: 1 INJECTION, SOLUTION INTRAMUSCULAR; INTRAVENOUS; SUBCUTANEOUS at 11:07

## 2020-07-18 RX ADMIN — SODIUM CHLORIDE 1000 ML: 0.9 INJECTION, SOLUTION INTRAVENOUS at 12:07

## 2020-07-18 RX ADMIN — HYDROMORPHONE HYDROCHLORIDE 0.8 MG: 1 INJECTION, SOLUTION INTRAMUSCULAR; INTRAVENOUS; SUBCUTANEOUS at 09:07

## 2020-07-18 RX ADMIN — ENOXAPARIN SODIUM 40 MG: 100 INJECTION SUBCUTANEOUS at 05:07

## 2020-07-18 RX ADMIN — HYDROMORPHONE HYDROCHLORIDE 0.8 MG: 1 INJECTION, SOLUTION INTRAMUSCULAR; INTRAVENOUS; SUBCUTANEOUS at 02:07

## 2020-07-18 RX ADMIN — HYDROMORPHONE HYDROCHLORIDE 0.8 MG: 1 INJECTION, SOLUTION INTRAMUSCULAR; INTRAVENOUS; SUBCUTANEOUS at 05:07

## 2020-07-18 RX ADMIN — CARVEDILOL 25 MG: 25 TABLET, FILM COATED ORAL at 08:07

## 2020-07-18 RX ADMIN — ONDANSETRON 8 MG: 2 INJECTION INTRAMUSCULAR; INTRAVENOUS at 11:07

## 2020-07-18 RX ADMIN — Medication 10 ML: at 06:07

## 2020-07-18 RX ADMIN — ONDANSETRON 8 MG: 2 INJECTION INTRAMUSCULAR; INTRAVENOUS at 05:07

## 2020-07-18 RX ADMIN — HYDROMORPHONE HYDROCHLORIDE 0.8 MG: 1 INJECTION, SOLUTION INTRAMUSCULAR; INTRAVENOUS; SUBCUTANEOUS at 08:07

## 2020-07-18 RX ADMIN — PROMETHAZINE HYDROCHLORIDE 6.25 MG: 25 INJECTION INTRAMUSCULAR; INTRAVENOUS at 05:07

## 2020-07-18 RX ADMIN — Medication 10 ML: at 05:07

## 2020-07-18 RX ADMIN — Medication 10 ML: at 11:07

## 2020-07-18 RX ADMIN — HYDROMORPHONE HYDROCHLORIDE 0.5 MG: 1 INJECTION, SOLUTION INTRAMUSCULAR; INTRAVENOUS; SUBCUTANEOUS at 05:07

## 2020-07-18 RX ADMIN — MAGNESIUM SULFATE HEPTAHYDRATE: 500 INJECTION, SOLUTION INTRAMUSCULAR; INTRAVENOUS at 10:07

## 2020-07-18 NOTE — SUBJECTIVE & OBJECTIVE
Subjective:     Interval History: no acute events. Denies abdominal pain. Passing flatus but no BM. Had almost 2L output from NG.     Post-Op Info:  Procedure(s) (LRB):  RESECTION, COLON, LOW ANTERIOR, possible loop ileostomy, ERAS high (N/A)          Medications:  Continuous Infusions:   TPN ADULT CENTRAL LINE CUSTOM 75 mL/hr at 07/17/20 2103    TPN ADULT CENTRAL LINE CUSTOM       Scheduled Meds:   carvediloL  25 mg Oral Daily    enoxaparin  40 mg Subcutaneous Q24H    sodium chloride 0.9%  10 mL Intravenous Q6H     PRN Meds:   dextrose 50%    dextrose 50%    glucagon (human recombinant)    glucagon (human recombinant)    HYDROmorphone    HYDROmorphone    insulin aspart U-100    insulin aspart U-100    ondansetron    promethazine (PHENERGAN) IVPB    sodium chloride 0.9%        Objective:     Vital Signs (Most Recent):  Temp: 97.3 °F (36.3 °C) (07/18/20 1223)  Pulse: 82 (07/18/20 0900)  Resp: 16 (07/18/20 1118)  BP: (!) 137/90 (07/18/20 0900)  SpO2: 98 % (07/18/20 0900) Vital Signs (24h Range):  Temp:  [97.3 °F (36.3 °C)-97.9 °F (36.6 °C)] 97.3 °F (36.3 °C)  Pulse:  [75-82] 82  Resp:  [11-18] 16  SpO2:  [97 %-98 %] 98 %  BP: (135-182)/() 137/90     Intake/Output - Last 3 Shifts       07/16 0700 - 07/17 0659 07/17 0700 - 07/18 0659 07/18 0700 - 07/19 0659    P.O. 600      Total Intake(mL/kg) 600 (8.5)      Urine (mL/kg/hr) 1500 (0.9)  600 (1.3)    Emesis/NG output 300      Drains  1650 100    Stool 0      Total Output 1800 1650 700    Net -1200 -1650 -700           Urine Occurrence 4 x      Stool Occurrence 0 x      Emesis Occurrence 4 x            Physical Exam  Vitals signs and nursing note reviewed.   Constitutional:       Appearance: Normal appearance. She is obese.   HENT:      Head: Normocephalic.      Nose: Nose normal.      Comments: NG tube in place LIWS  Neck:      Musculoskeletal: Normal range of motion.   Cardiovascular:      Rate and Rhythm: Normal rate.   Pulmonary:      Effort:  Pulmonary effort is normal.   Abdominal:      General: Abdomen is flat. There is no distension.      Tenderness: There is no abdominal tenderness.   Musculoskeletal: Normal range of motion.   Skin:     General: Skin is warm and dry.      Capillary Refill: Capillary refill takes less than 2 seconds.   Neurological:      General: No focal deficit present.      Mental Status: She is alert and oriented to person, place, and time.   Psychiatric:         Mood and Affect: Mood normal.         Behavior: Behavior normal.         Thought Content: Thought content normal.         Significant Labs:  CBC (Last 3 Results):   Recent Labs   Lab 07/16/20  0320 07/17/20  0302 07/18/20  0638   WBC 10.04 7.31 8.62   RBC 4.38 3.96* 4.15   HGB 10.1* 9.3* 9.8*   HCT 32.4* 29.9* 31.6*   * 417* 424*   MCV 74* 76* 76*   MCH 23.1* 23.5* 23.6*   MCHC 31.2* 31.1* 31.0*     CMP (Last 3 Results):   Recent Labs   Lab 07/16/20  0320 07/17/20  0302 07/18/20  0638   * 132* 136*  136*   CALCIUM 9.2 8.8 8.7  8.7   ALBUMIN 3.0* 2.8* 2.7*   PROT 7.7 7.4 7.8   * 135* 134*  134*   K 3.7 3.4* 3.5  3.5   CO2 29 28 25  25   CL 95 99 99  99   BUN 6* 6* 16  16   CREATININE 1.0 0.9 0.9  0.9   ALKPHOS 67 64 65   ALT 10 10 10   AST 19 15 13   BILITOT 0.2 0.2 0.2       Significant Diagnostics:  none

## 2020-07-18 NOTE — PROGRESS NOTES
[facility]  Colorectal Surgery  Progress Note    Patient Name: Azucena Morrison  MRN: [id number]  Admission Date: 7/15/2020  Hospital Length of Stay: 3 days  Attending Physician: Fabiana Valiente MD    Subjective:     Interval History: no acute events. Denies abdominal pain. Passing flatus but no BM. Had almost 2L output from NG.     Post-Op Info:  Procedure(s) (LRB):  RESECTION, COLON, LOW ANTERIOR, possible loop ileostomy, ERAS high (N/A)          Medications:  Continuous Infusions:   TPN ADULT CENTRAL LINE CUSTOM 75 mL/hr at 07/17/20 2103    TPN ADULT CENTRAL LINE CUSTOM       Scheduled Meds:   carvediloL  25 mg Oral Daily    enoxaparin  40 mg Subcutaneous Q24H    sodium chloride 0.9%  10 mL Intravenous Q6H     PRN Meds:   dextrose 50%    dextrose 50%    glucagon (human recombinant)    glucagon (human recombinant)    HYDROmorphone    HYDROmorphone    insulin aspart U-100    insulin aspart U-100    ondansetron    promethazine (PHENERGAN) IVPB    sodium chloride 0.9%        Objective:     Vital Signs (Most Recent):  Temp: 97.3 °F (36.3 °C) (07/18/20 1223)  Pulse: 82 (07/18/20 0900)  Resp: 16 (07/18/20 1118)  BP: (!) 137/90 (07/18/20 0900)  SpO2: 98 % (07/18/20 0900) Vital Signs (24h Range):  Temp:  [97.3 °F (36.3 °C)-97.9 °F (36.6 °C)] 97.3 °F (36.3 °C)  Pulse:  [75-82] 82  Resp:  [11-18] 16  SpO2:  [97 %-98 %] 98 %  BP: (135-182)/() 137/90     Intake/Output - Last 3 Shifts       07/16 0700 - 07/17 0659 07/17 0700 - 07/18 0659 07/18 0700 - 07/19 0659    P.O. 600      Total Intake(mL/kg) 600 (8.5)      Urine (mL/kg/hr) 1500 (0.9)  600 (1.3)    Emesis/NG output 300      Drains  1650 100    Stool 0      Total Output 1800 1650 700    Net -1200 -1650 -700           Urine Occurrence 4 x      Stool Occurrence 0 x      Emesis Occurrence 4 x            Physical Exam  Vitals signs and nursing note reviewed.   Constitutional:       Appearance: Normal appearance. She is obese.   HENT:       Head: Normocephalic.      Nose: Nose normal.      Comments: NG tube in place LIWS  Neck:      Musculoskeletal: Normal range of motion.   Cardiovascular:      Rate and Rhythm: Normal rate.   Pulmonary:      Effort: Pulmonary effort is normal.   Abdominal:      General: Abdomen is flat. There is no distension.      Tenderness: There is no abdominal tenderness.   Musculoskeletal: Normal range of motion.   Skin:     General: Skin is warm and dry.      Capillary Refill: Capillary refill takes less than 2 seconds.   Neurological:      General: No focal deficit present.      Mental Status: She is alert and oriented to person, place, and time.   Psychiatric:         Mood and Affect: Mood normal.         Behavior: Behavior normal.         Thought Content: Thought content normal.         Significant Labs:  CBC (Last 3 Results):   Recent Labs   Lab 07/16/20  0320 07/17/20  0302 07/18/20  0638   WBC 10.04 7.31 8.62   RBC 4.38 3.96* 4.15   HGB 10.1* 9.3* 9.8*   HCT 32.4* 29.9* 31.6*   * 417* 424*   MCV 74* 76* 76*   MCH 23.1* 23.5* 23.6*   MCHC 31.2* 31.1* 31.0*     CMP (Last 3 Results):   Recent Labs   Lab 07/16/20  0320 07/17/20  0302 07/18/20  0638   * 132* 136*  136*   CALCIUM 9.2 8.8 8.7  8.7   ALBUMIN 3.0* 2.8* 2.7*   PROT 7.7 7.4 7.8   * 135* 134*  134*   K 3.7 3.4* 3.5  3.5   CO2 29 28 25  25   CL 95 99 99  99   BUN 6* 6* 16  16   CREATININE 1.0 0.9 0.9  0.9   ALKPHOS 67 64 65   ALT 10 10 10   AST 19 15 13   BILITOT 0.2 0.2 0.2       Significant Diagnostics:  none    Assessment/Plan:     * Cutaneous fistula  Surgical intervention potentially planned for later in month  Continue TPN  NG to LIMS another day  Pain control  IVF - replace losses    Chronic kidney disease, stage 3  Monitor labs    Essential hypertension  Monitor BP  Home meds    Type 2 diabetes mellitus without complication, with long-term current use of insulin  SSI  Accu check Q6          Pat Turner MD  Colorectal  Surgery  [facility]

## 2020-07-18 NOTE — PLAN OF CARE
Pt is AAOx4. VSS. No falls/injury as pt calls for assistance when needed. Pt able to ambulate to void. Assisted pt with dressing change to abdomen- gauze and tape clean, dry, intact. Pain & nausea being monitored and controlled with PRN meds. TPN infusing as ordered. Blood sugar being monitored. NGT in place to LIWS. Bed in lowest position. I&O recorded. Call light within reach. Will continue to monitor.

## 2020-07-18 NOTE — ASSESSMENT & PLAN NOTE
Surgical intervention potentially planned for later in month  Continue TPN  NG to LIMS another day  Pain control  IVF - replace losses

## 2020-07-19 LAB
ALBUMIN SERPL BCP-MCNC: 2.5 G/DL (ref 3.5–5.2)
ALP SERPL-CCNC: 62 U/L (ref 55–135)
ALT SERPL W/O P-5'-P-CCNC: 9 U/L (ref 10–44)
ANION GAP SERPL CALC-SCNC: 6 MMOL/L (ref 8–16)
AST SERPL-CCNC: 15 U/L (ref 10–40)
BASOPHILS # BLD AUTO: 0.02 K/UL (ref 0–0.2)
BASOPHILS NFR BLD: 0.3 % (ref 0–1.9)
BILIRUB SERPL-MCNC: 0.2 MG/DL (ref 0.1–1)
BUN SERPL-MCNC: 17 MG/DL (ref 8–23)
CALCIUM SERPL-MCNC: 8.4 MG/DL (ref 8.7–10.5)
CHLORIDE SERPL-SCNC: 100 MMOL/L (ref 95–110)
CO2 SERPL-SCNC: 29 MMOL/L (ref 23–29)
CREAT SERPL-MCNC: 0.9 MG/DL (ref 0.5–1.4)
DIFFERENTIAL METHOD: ABNORMAL
EOSINOPHIL # BLD AUTO: 0.4 K/UL (ref 0–0.5)
EOSINOPHIL NFR BLD: 6.2 % (ref 0–8)
ERYTHROCYTE [DISTWIDTH] IN BLOOD BY AUTOMATED COUNT: 16.7 % (ref 11.5–14.5)
EST. GFR  (AFRICAN AMERICAN): >60 ML/MIN/1.73 M^2
EST. GFR  (NON AFRICAN AMERICAN): >60 ML/MIN/1.73 M^2
GLUCOSE SERPL-MCNC: 93 MG/DL (ref 70–110)
HCT VFR BLD AUTO: 29 % (ref 37–48.5)
HGB BLD-MCNC: 9 G/DL (ref 12–16)
IMM GRANULOCYTES # BLD AUTO: 0.02 K/UL (ref 0–0.04)
IMM GRANULOCYTES NFR BLD AUTO: 0.3 % (ref 0–0.5)
LYMPHOCYTES # BLD AUTO: 2 K/UL (ref 1–4.8)
LYMPHOCYTES NFR BLD: 28.5 % (ref 18–48)
MAGNESIUM SERPL-MCNC: 1.9 MG/DL (ref 1.6–2.6)
MCH RBC QN AUTO: 23 PG (ref 27–31)
MCHC RBC AUTO-ENTMCNC: 31 G/DL (ref 32–36)
MCV RBC AUTO: 74 FL (ref 82–98)
MONOCYTES # BLD AUTO: 0.6 K/UL (ref 0.3–1)
MONOCYTES NFR BLD: 7.9 % (ref 4–15)
NEUTROPHILS # BLD AUTO: 4 K/UL (ref 1.8–7.7)
NEUTROPHILS NFR BLD: 56.8 % (ref 38–73)
NRBC BLD-RTO: 0 /100 WBC
PHOSPHATE SERPL-MCNC: 3.5 MG/DL (ref 2.7–4.5)
PLATELET # BLD AUTO: 340 K/UL (ref 150–350)
PMV BLD AUTO: 8.6 FL (ref 9.2–12.9)
POCT GLUCOSE: 143 MG/DL (ref 70–110)
POCT GLUCOSE: 154 MG/DL (ref 70–110)
POCT GLUCOSE: 160 MG/DL (ref 70–110)
POTASSIUM SERPL-SCNC: 3.9 MMOL/L (ref 3.5–5.1)
PROT SERPL-MCNC: 6.8 G/DL (ref 6–8.4)
RBC # BLD AUTO: 3.92 M/UL (ref 4–5.4)
SODIUM SERPL-SCNC: 135 MMOL/L (ref 136–145)
WBC # BLD AUTO: 7.1 K/UL (ref 3.9–12.7)

## 2020-07-19 PROCEDURE — 84100 ASSAY OF PHOSPHORUS: CPT

## 2020-07-19 PROCEDURE — 63600175 PHARM REV CODE 636 W HCPCS: Performed by: NURSE PRACTITIONER

## 2020-07-19 PROCEDURE — 25000003 PHARM REV CODE 250: Performed by: COLON & RECTAL SURGERY

## 2020-07-19 PROCEDURE — 25000003 PHARM REV CODE 250: Performed by: STUDENT IN AN ORGANIZED HEALTH CARE EDUCATION/TRAINING PROGRAM

## 2020-07-19 PROCEDURE — 63600175 PHARM REV CODE 636 W HCPCS: Performed by: STUDENT IN AN ORGANIZED HEALTH CARE EDUCATION/TRAINING PROGRAM

## 2020-07-19 PROCEDURE — A4217 STERILE WATER/SALINE, 500 ML: HCPCS | Performed by: STUDENT IN AN ORGANIZED HEALTH CARE EDUCATION/TRAINING PROGRAM

## 2020-07-19 PROCEDURE — A4216 STERILE WATER/SALINE, 10 ML: HCPCS | Performed by: COLON & RECTAL SURGERY

## 2020-07-19 PROCEDURE — 80053 COMPREHEN METABOLIC PANEL: CPT

## 2020-07-19 PROCEDURE — 83735 ASSAY OF MAGNESIUM: CPT

## 2020-07-19 PROCEDURE — 85025 COMPLETE CBC W/AUTO DIFF WBC: CPT

## 2020-07-19 PROCEDURE — 11000001 HC ACUTE MED/SURG PRIVATE ROOM

## 2020-07-19 RX ADMIN — ONDANSETRON 8 MG: 2 INJECTION INTRAMUSCULAR; INTRAVENOUS at 07:07

## 2020-07-19 RX ADMIN — MAGNESIUM SULFATE HEPTAHYDRATE: 500 INJECTION, SOLUTION INTRAMUSCULAR; INTRAVENOUS at 11:07

## 2020-07-19 RX ADMIN — Medication 10 ML: at 11:07

## 2020-07-19 RX ADMIN — HYDROMORPHONE HYDROCHLORIDE 0.8 MG: 1 INJECTION, SOLUTION INTRAMUSCULAR; INTRAVENOUS; SUBCUTANEOUS at 11:07

## 2020-07-19 RX ADMIN — Medication 10 ML: at 06:07

## 2020-07-19 RX ADMIN — ENOXAPARIN SODIUM 40 MG: 100 INJECTION SUBCUTANEOUS at 04:07

## 2020-07-19 RX ADMIN — ONDANSETRON 8 MG: 2 INJECTION INTRAMUSCULAR; INTRAVENOUS at 05:07

## 2020-07-19 RX ADMIN — HYDROMORPHONE HYDROCHLORIDE 0.8 MG: 1 INJECTION, SOLUTION INTRAMUSCULAR; INTRAVENOUS; SUBCUTANEOUS at 08:07

## 2020-07-19 RX ADMIN — HYDROMORPHONE HYDROCHLORIDE 0.8 MG: 1 INJECTION, SOLUTION INTRAMUSCULAR; INTRAVENOUS; SUBCUTANEOUS at 07:07

## 2020-07-19 RX ADMIN — HYDROMORPHONE HYDROCHLORIDE 0.8 MG: 1 INJECTION, SOLUTION INTRAMUSCULAR; INTRAVENOUS; SUBCUTANEOUS at 02:07

## 2020-07-19 RX ADMIN — HYDROMORPHONE HYDROCHLORIDE 0.8 MG: 1 INJECTION, SOLUTION INTRAMUSCULAR; INTRAVENOUS; SUBCUTANEOUS at 04:07

## 2020-07-19 RX ADMIN — HYDROMORPHONE HYDROCHLORIDE 0.8 MG: 1 INJECTION, SOLUTION INTRAMUSCULAR; INTRAVENOUS; SUBCUTANEOUS at 01:07

## 2020-07-19 RX ADMIN — CARVEDILOL 25 MG: 25 TABLET, FILM COATED ORAL at 09:07

## 2020-07-19 RX ADMIN — HYDROMORPHONE HYDROCHLORIDE 0.8 MG: 1 INJECTION, SOLUTION INTRAMUSCULAR; INTRAVENOUS; SUBCUTANEOUS at 05:07

## 2020-07-19 RX ADMIN — HYDROMORPHONE HYDROCHLORIDE 0.8 MG: 1 INJECTION, SOLUTION INTRAMUSCULAR; INTRAVENOUS; SUBCUTANEOUS at 09:07

## 2020-07-19 RX ADMIN — Medication 10 ML: at 12:07

## 2020-07-19 RX ADMIN — ONDANSETRON 8 MG: 2 INJECTION INTRAMUSCULAR; INTRAVENOUS at 11:07

## 2020-07-19 RX ADMIN — PROMETHAZINE HYDROCHLORIDE 6.25 MG: 25 INJECTION INTRAMUSCULAR; INTRAVENOUS at 11:07

## 2020-07-19 NOTE — ASSESSMENT & PLAN NOTE
Surgical intervention potentially planned for later in month  Continue TPN  Keep NG to LIMS another day  Pain control  IVF - replace losses

## 2020-07-19 NOTE — SUBJECTIVE & OBJECTIVE
Subjective:     Interval History:   No acute events overnight.   Not complaining of any pain.   No BM, but passing flatus.   NG tube 600cc over last 24 hours.     Post-Op Info:  Procedure(s) (LRB):  RESECTION, COLON, LOW ANTERIOR, possible loop ileostomy, ERAS high (N/A)          Medications:  Continuous Infusions:   TPN ADULT CENTRAL LINE CUSTOM 75 mL/hr at 07/18/20 2210    TPN ADULT CENTRAL LINE CUSTOM       Scheduled Meds:   carvediloL  25 mg Oral Daily    enoxaparin  40 mg Subcutaneous Q24H    sodium chloride 0.9%  10 mL Intravenous Q6H     PRN Meds:   dextrose 50%    dextrose 50%    glucagon (human recombinant)    glucagon (human recombinant)    HYDROmorphone    HYDROmorphone    insulin aspart U-100    insulin aspart U-100    ondansetron    promethazine (PHENERGAN) IVPB    sodium chloride 0.9%        Objective:     Vital Signs (Most Recent):  Temp: 98 °F (36.7 °C) (07/19/20 0712)  Pulse: 73 (07/19/20 0712)  Resp: 18 (07/19/20 0933)  BP: 120/74 (07/19/20 0712)  SpO2: 97 % (07/19/20 0712) Vital Signs (24h Range):  Temp:  [97.3 °F (36.3 °C)-98.6 °F (37 °C)] 98 °F (36.7 °C)  Pulse:  [73-81] 73  Resp:  [8-18] 18  SpO2:  [97 %-99 %] 97 %  BP: (120-146)/(74-95) 120/74     Intake/Output - Last 3 Shifts       07/17 0700 - 07/18 0659 07/18 0700 - 07/19 0659 07/19 0700 - 07/20 0659    P.O.  0     I.V. (mL/kg)  415 (5.9)     NG/GT  40 20    IV Piggyback  1000     TPN  900     Total Intake(mL/kg)  2355 (33.3) 20 (0.3)    Urine (mL/kg/hr)  1450 (0.9) 800 (2.9)    Emesis/NG output       Drains 1650 700 300    Stool       Total Output 1650 2150 1100    Net -1650 +205 -1080                 Physical Exam  Vitals signs and nursing note reviewed.   Constitutional:       Appearance: Normal appearance. She is obese.   HENT:      Head: Normocephalic.      Nose: Nose normal.      Comments: NG tube in place LIWS  Neck:      Musculoskeletal: Normal range of motion.   Cardiovascular:      Rate and Rhythm: Normal rate.    Pulmonary:      Effort: Pulmonary effort is normal.   Abdominal:      General: Abdomen is flat. There is no distension.      Tenderness: There is no abdominal tenderness.   Musculoskeletal: Normal range of motion.   Skin:     General: Skin is warm and dry.      Capillary Refill: Capillary refill takes less than 2 seconds.   Neurological:      General: No focal deficit present.      Mental Status: She is alert and oriented to person, place, and time.   Psychiatric:         Mood and Affect: Mood normal.         Behavior: Behavior normal.         Thought Content: Thought content normal.         Significant Labs:  CBC (Last 3 Results):   Recent Labs   Lab 07/17/20  0302 07/18/20  0638 07/19/20  0417   WBC 7.31 8.62 7.10   RBC 3.96* 4.15 3.92*   HGB 9.3* 9.8* 9.0*   HCT 29.9* 31.6* 29.0*   * 424* 340   MCV 76* 76* 74*   MCH 23.5* 23.6* 23.0*   MCHC 31.1* 31.0* 31.0*     CMP (Last 3 Results):   Recent Labs   Lab 07/17/20  0302 07/18/20  0638 07/19/20  0417   * 136*  136* 93   CALCIUM 8.8 8.7  8.7 8.4*   ALBUMIN 2.8* 2.7* 2.5*   PROT 7.4 7.8 6.8   * 134*  134* 135*   K 3.4* 3.5  3.5 3.9   CO2 28 25  25 29   CL 99 99  99 100   BUN 6* 16  16 17   CREATININE 0.9 0.9  0.9 0.9   ALKPHOS 64 65 62   ALT 10 10 9*   AST 15 13 15   BILITOT 0.2 0.2 0.2       Significant Diagnostics:  none

## 2020-07-19 NOTE — PROGRESS NOTES
[facility]  Colorectal Surgery  Progress Note    Patient Name: Azucena Morrison  MRN: [id number]  Admission Date: 7/15/2020  Hospital Length of Stay: 4 days  Attending Physician: Fabiana Valiente MD    Subjective:     Interval History:   No acute events overnight.   Not complaining of any pain.   No BM, but passing flatus.   NG tube 600cc over last 24 hours.     Post-Op Info:  Procedure(s) (LRB):  RESECTION, COLON, LOW ANTERIOR, possible loop ileostomy, ERAS high (N/A)          Medications:  Continuous Infusions:   TPN ADULT CENTRAL LINE CUSTOM 75 mL/hr at 07/18/20 2210    TPN ADULT CENTRAL LINE CUSTOM       Scheduled Meds:   carvediloL  25 mg Oral Daily    enoxaparin  40 mg Subcutaneous Q24H    sodium chloride 0.9%  10 mL Intravenous Q6H     PRN Meds:   dextrose 50%    dextrose 50%    glucagon (human recombinant)    glucagon (human recombinant)    HYDROmorphone    HYDROmorphone    insulin aspart U-100    insulin aspart U-100    ondansetron    promethazine (PHENERGAN) IVPB    sodium chloride 0.9%        Objective:     Vital Signs (Most Recent):  Temp: 98 °F (36.7 °C) (07/19/20 0712)  Pulse: 73 (07/19/20 0712)  Resp: 18 (07/19/20 0933)  BP: 120/74 (07/19/20 0712)  SpO2: 97 % (07/19/20 0712) Vital Signs (24h Range):  Temp:  [97.3 °F (36.3 °C)-98.6 °F (37 °C)] 98 °F (36.7 °C)  Pulse:  [73-81] 73  Resp:  [8-18] 18  SpO2:  [97 %-99 %] 97 %  BP: (120-146)/(74-95) 120/74     Intake/Output - Last 3 Shifts       07/17 0700 - 07/18 0659 07/18 0700 - 07/19 0659 07/19 0700 - 07/20 0659    P.O.  0     I.V. (mL/kg)  415 (5.9)     NG/GT  40 20    IV Piggyback  1000     TPN  900     Total Intake(mL/kg)  2355 (33.3) 20 (0.3)    Urine (mL/kg/hr)  1450 (0.9) 800 (2.9)    Emesis/NG output       Drains 1650 700 300    Stool       Total Output 1650 2150 1100    Net -1650 +205 -1080                 Physical Exam  Vitals signs and nursing note reviewed.   Constitutional:       Appearance: Normal  appearance. She is obese.   HENT:      Head: Normocephalic.      Nose: Nose normal.      Comments: NG tube in place LIWS  Neck:      Musculoskeletal: Normal range of motion.   Cardiovascular:      Rate and Rhythm: Normal rate.   Pulmonary:      Effort: Pulmonary effort is normal.   Abdominal:      General: Abdomen is flat. There is no distension.      Tenderness: There is no abdominal tenderness.   Musculoskeletal: Normal range of motion.   Skin:     General: Skin is warm and dry.      Capillary Refill: Capillary refill takes less than 2 seconds.   Neurological:      General: No focal deficit present.      Mental Status: She is alert and oriented to person, place, and time.   Psychiatric:         Mood and Affect: Mood normal.         Behavior: Behavior normal.         Thought Content: Thought content normal.         Significant Labs:  CBC (Last 3 Results):   Recent Labs   Lab 07/17/20  0302 07/18/20  0638 07/19/20  0417   WBC 7.31 8.62 7.10   RBC 3.96* 4.15 3.92*   HGB 9.3* 9.8* 9.0*   HCT 29.9* 31.6* 29.0*   * 424* 340   MCV 76* 76* 74*   MCH 23.5* 23.6* 23.0*   MCHC 31.1* 31.0* 31.0*     CMP (Last 3 Results):   Recent Labs   Lab 07/17/20  0302 07/18/20  0638 07/19/20  0417   * 136*  136* 93   CALCIUM 8.8 8.7  8.7 8.4*   ALBUMIN 2.8* 2.7* 2.5*   PROT 7.4 7.8 6.8   * 134*  134* 135*   K 3.4* 3.5  3.5 3.9   CO2 28 25  25 29   CL 99 99  99 100   BUN 6* 16  16 17   CREATININE 0.9 0.9  0.9 0.9   ALKPHOS 64 65 62   ALT 10 10 9*   AST 15 13 15   BILITOT 0.2 0.2 0.2       Significant Diagnostics:  none    Assessment/Plan:     * Cutaneous fistula  Surgical intervention potentially planned for later in month  Continue TPN  Keep NG to LIMS another day  Pain control  IVF - replace losses    Chronic kidney disease, stage 3  Monitor labs    Essential hypertension  Monitor BP  Home meds    Type 2 diabetes mellitus without complication, with long-term current use of insulin  SSI  Accu check  Q6          Ramonita Osorio MD  Colorectal Surgery  [facility]

## 2020-07-20 LAB
ALBUMIN SERPL BCP-MCNC: 2.7 G/DL (ref 3.5–5.2)
ALP SERPL-CCNC: 70 U/L (ref 55–135)
ALT SERPL W/O P-5'-P-CCNC: 11 U/L (ref 10–44)
ANION GAP SERPL CALC-SCNC: 6 MMOL/L (ref 8–16)
AST SERPL-CCNC: 29 U/L (ref 10–40)
BASOPHILS # BLD AUTO: 0.03 K/UL (ref 0–0.2)
BASOPHILS NFR BLD: 0.4 % (ref 0–1.9)
BILIRUB SERPL-MCNC: 0.2 MG/DL (ref 0.1–1)
BUN SERPL-MCNC: 17 MG/DL (ref 8–23)
CALCIUM SERPL-MCNC: 8.7 MG/DL (ref 8.7–10.5)
CHLORIDE SERPL-SCNC: 100 MMOL/L (ref 95–110)
CO2 SERPL-SCNC: 28 MMOL/L (ref 23–29)
CREAT SERPL-MCNC: 0.9 MG/DL (ref 0.5–1.4)
DIFFERENTIAL METHOD: ABNORMAL
EOSINOPHIL # BLD AUTO: 0.3 K/UL (ref 0–0.5)
EOSINOPHIL NFR BLD: 3.7 % (ref 0–8)
ERYTHROCYTE [DISTWIDTH] IN BLOOD BY AUTOMATED COUNT: 16.6 % (ref 11.5–14.5)
EST. GFR  (AFRICAN AMERICAN): >60 ML/MIN/1.73 M^2
EST. GFR  (NON AFRICAN AMERICAN): >60 ML/MIN/1.73 M^2
GLUCOSE SERPL-MCNC: 141 MG/DL (ref 70–110)
HCT VFR BLD AUTO: 30.5 % (ref 37–48.5)
HGB BLD-MCNC: 9.3 G/DL (ref 12–16)
IMM GRANULOCYTES # BLD AUTO: 0.02 K/UL (ref 0–0.04)
IMM GRANULOCYTES NFR BLD AUTO: 0.3 % (ref 0–0.5)
LYMPHOCYTES # BLD AUTO: 1.5 K/UL (ref 1–4.8)
LYMPHOCYTES NFR BLD: 21.3 % (ref 18–48)
MAGNESIUM SERPL-MCNC: 2 MG/DL (ref 1.6–2.6)
MCH RBC QN AUTO: 23.3 PG (ref 27–31)
MCHC RBC AUTO-ENTMCNC: 30.5 G/DL (ref 32–36)
MCV RBC AUTO: 76 FL (ref 82–98)
MONOCYTES # BLD AUTO: 0.6 K/UL (ref 0.3–1)
MONOCYTES NFR BLD: 7.9 % (ref 4–15)
NEUTROPHILS # BLD AUTO: 4.8 K/UL (ref 1.8–7.7)
NEUTROPHILS NFR BLD: 66.4 % (ref 38–73)
NRBC BLD-RTO: 0 /100 WBC
PHOSPHATE SERPL-MCNC: 3.4 MG/DL (ref 2.7–4.5)
PLATELET # BLD AUTO: 337 K/UL (ref 150–350)
PMV BLD AUTO: 8.7 FL (ref 9.2–12.9)
POCT GLUCOSE: 139 MG/DL (ref 70–110)
POCT GLUCOSE: 149 MG/DL (ref 70–110)
POCT GLUCOSE: 164 MG/DL (ref 70–110)
POTASSIUM SERPL-SCNC: 4.3 MMOL/L (ref 3.5–5.1)
PROT SERPL-MCNC: 7.4 G/DL (ref 6–8.4)
RBC # BLD AUTO: 4 M/UL (ref 4–5.4)
SODIUM SERPL-SCNC: 134 MMOL/L (ref 136–145)
TRIGL SERPL-MCNC: 379 MG/DL (ref 30–150)
WBC # BLD AUTO: 7.23 K/UL (ref 3.9–12.7)

## 2020-07-20 PROCEDURE — 25000003 PHARM REV CODE 250: Performed by: STUDENT IN AN ORGANIZED HEALTH CARE EDUCATION/TRAINING PROGRAM

## 2020-07-20 PROCEDURE — 85025 COMPLETE CBC W/AUTO DIFF WBC: CPT

## 2020-07-20 PROCEDURE — 25000003 PHARM REV CODE 250: Performed by: COLON & RECTAL SURGERY

## 2020-07-20 PROCEDURE — 63600175 PHARM REV CODE 636 W HCPCS: Performed by: STUDENT IN AN ORGANIZED HEALTH CARE EDUCATION/TRAINING PROGRAM

## 2020-07-20 PROCEDURE — A4217 STERILE WATER/SALINE, 500 ML: HCPCS | Performed by: NURSE PRACTITIONER

## 2020-07-20 PROCEDURE — 63600175 PHARM REV CODE 636 W HCPCS: Performed by: NURSE PRACTITIONER

## 2020-07-20 PROCEDURE — 25000003 PHARM REV CODE 250: Performed by: NURSE PRACTITIONER

## 2020-07-20 PROCEDURE — 84478 ASSAY OF TRIGLYCERIDES: CPT

## 2020-07-20 PROCEDURE — 11000001 HC ACUTE MED/SURG PRIVATE ROOM

## 2020-07-20 PROCEDURE — B4185 PARENTERAL SOL 10 GM LIPIDS: HCPCS | Performed by: NURSE PRACTITIONER

## 2020-07-20 PROCEDURE — 84100 ASSAY OF PHOSPHORUS: CPT

## 2020-07-20 PROCEDURE — 83735 ASSAY OF MAGNESIUM: CPT

## 2020-07-20 PROCEDURE — 99233 SBSQ HOSP IP/OBS HIGH 50: CPT | Mod: ,,, | Performed by: COLON & RECTAL SURGERY

## 2020-07-20 PROCEDURE — 80053 COMPREHEN METABOLIC PANEL: CPT

## 2020-07-20 PROCEDURE — A4216 STERILE WATER/SALINE, 10 ML: HCPCS | Performed by: COLON & RECTAL SURGERY

## 2020-07-20 PROCEDURE — 99233 PR SUBSEQUENT HOSPITAL CARE,LEVL III: ICD-10-PCS | Mod: ,,, | Performed by: COLON & RECTAL SURGERY

## 2020-07-20 RX ORDER — HYDROMORPHONE HYDROCHLORIDE 1 MG/ML
0.8 INJECTION, SOLUTION INTRAMUSCULAR; INTRAVENOUS; SUBCUTANEOUS
Status: DISCONTINUED | OUTPATIENT
Start: 2020-07-20 | End: 2020-07-28

## 2020-07-20 RX ORDER — HYDROMORPHONE HYDROCHLORIDE 1 MG/ML
0.5 INJECTION, SOLUTION INTRAMUSCULAR; INTRAVENOUS; SUBCUTANEOUS
Status: DISCONTINUED | OUTPATIENT
Start: 2020-07-20 | End: 2020-07-28

## 2020-07-20 RX ADMIN — ONDANSETRON 8 MG: 2 INJECTION INTRAMUSCULAR; INTRAVENOUS at 08:07

## 2020-07-20 RX ADMIN — PROMETHAZINE HYDROCHLORIDE 6.25 MG: 25 INJECTION INTRAMUSCULAR; INTRAVENOUS at 02:07

## 2020-07-20 RX ADMIN — HYDROMORPHONE HYDROCHLORIDE 0.8 MG: 1 INJECTION, SOLUTION INTRAMUSCULAR; INTRAVENOUS; SUBCUTANEOUS at 04:07

## 2020-07-20 RX ADMIN — SOYBEAN OIL 250 ML: 20 INJECTION, SOLUTION INTRAVENOUS at 10:07

## 2020-07-20 RX ADMIN — Medication 10 ML: at 12:07

## 2020-07-20 RX ADMIN — CARVEDILOL 25 MG: 25 TABLET, FILM COATED ORAL at 08:07

## 2020-07-20 RX ADMIN — HYDROMORPHONE HYDROCHLORIDE 0.8 MG: 1 INJECTION, SOLUTION INTRAMUSCULAR; INTRAVENOUS; SUBCUTANEOUS at 09:07

## 2020-07-20 RX ADMIN — HYDROMORPHONE HYDROCHLORIDE 0.8 MG: 1 INJECTION, SOLUTION INTRAMUSCULAR; INTRAVENOUS; SUBCUTANEOUS at 08:07

## 2020-07-20 RX ADMIN — HYDROMORPHONE HYDROCHLORIDE 0.5 MG: 1 INJECTION, SOLUTION INTRAMUSCULAR; INTRAVENOUS; SUBCUTANEOUS at 12:07

## 2020-07-20 RX ADMIN — ONDANSETRON 8 MG: 2 INJECTION INTRAMUSCULAR; INTRAVENOUS at 06:07

## 2020-07-20 RX ADMIN — Medication 10 ML: at 01:07

## 2020-07-20 RX ADMIN — MAGNESIUM SULFATE HEPTAHYDRATE: 500 INJECTION, SOLUTION INTRAMUSCULAR; INTRAVENOUS at 10:07

## 2020-07-20 RX ADMIN — HYDROMORPHONE HYDROCHLORIDE 0.5 MG: 1 INJECTION, SOLUTION INTRAMUSCULAR; INTRAVENOUS; SUBCUTANEOUS at 06:07

## 2020-07-20 RX ADMIN — Medication 10 ML: at 06:07

## 2020-07-20 RX ADMIN — Medication 10 ML: at 05:07

## 2020-07-20 RX ADMIN — ENOXAPARIN SODIUM 40 MG: 100 INJECTION SUBCUTANEOUS at 05:07

## 2020-07-20 RX ADMIN — HYDROMORPHONE HYDROCHLORIDE 0.5 MG: 1 INJECTION, SOLUTION INTRAMUSCULAR; INTRAVENOUS; SUBCUTANEOUS at 03:07

## 2020-07-20 NOTE — PROGRESS NOTES
[facility]  Colorectal Surgery  Progress Note    Patient Name: Azucena Morrison  MRN: [id number]  Admission Date: 7/15/2020  Hospital Length of Stay: 5 days  Attending Physician: Fabiana Valiente MD    Subjective:     Interval History: no acute events overnite, NGT intact, tpn infusing    Post-Op Info:  Procedure(s) (LRB):  RESECTION, COLON, LOW ANTERIOR, possible loop ileostomy, ERAS high (N/A)          Medications:  Continuous Infusions:   TPN ADULT CENTRAL LINE CUSTOM 75 mL/hr at 07/19/20 2304    TPN ADULT CENTRAL LINE CUSTOM       Scheduled Meds:   carvediloL  25 mg Oral Daily    enoxaparin  40 mg Subcutaneous Q24H    fat emulsion 20%  250 mL Intravenous Daily    sodium chloride 0.9%  10 mL Intravenous Q6H     PRN Meds:   dextrose 50%    dextrose 50%    glucagon (human recombinant)    glucagon (human recombinant)    HYDROmorphone    HYDROmorphone    insulin aspart U-100    insulin aspart U-100    ondansetron    promethazine (PHENERGAN) IVPB    sodium chloride 0.9%        Objective:     Vital Signs (Most Recent):  Temp: 99 °F (37.2 °C) (07/20/20 0818)  Pulse: 77 (07/20/20 0818)  Resp: 18 (07/20/20 1225)  BP: (!) 143/80 (07/20/20 0818)  SpO2: 97 % (07/20/20 0818) Vital Signs (24h Range):  Temp:  [97.5 °F (36.4 °C)-99 °F (37.2 °C)] 99 °F (37.2 °C)  Pulse:  [72-81] 77  Resp:  [11-18] 18  SpO2:  [97 %-98 %] 97 %  BP: (122-143)/(79-87) 143/80     Intake/Output - Last 3 Shifts       07/18 0700 - 07/19 0659 07/19 0700 - 07/20 0659 07/20 0700 - 07/21 0659    P.O. 0 0     I.V. (mL/kg) 415 (5.9)      NG/GT 40 60 20    IV Piggyback 1000 50      1525     Total Intake(mL/kg) 2355 (33.3) 1635 (23.1) 20 (0.3)    Urine (mL/kg/hr) 1450 (0.9) 2050 (1.2) 800 (1.8)    Drains 700 750     Total Output 2150 2800 800    Net +205 -1165 -780                 Physical Exam  Constitutional:       Appearance: She is well-developed. She is not ill-appearing.   HENT:      Head: Normocephalic.   Eyes:       Pupils: Pupils are equal, round, and reactive to light.   Cardiovascular:      Rate and Rhythm: Normal rate and regular rhythm.      Heart sounds: Normal heart sounds.   Pulmonary:      Effort: Pulmonary effort is normal. No respiratory distress.      Breath sounds: Normal breath sounds. No wheezing or rales.   Abdominal:      Palpations: Abdomen is soft. There is no mass.      Tenderness: There is no guarding or rebound.      Comments: NGT with greenish output  654cc   Skin:     General: Skin is warm and dry.   Neurological:      Mental Status: She is alert and oriented to person, place, and time.   Psychiatric:         Behavior: Behavior normal.         Thought Content: Thought content normal.         Judgment: Judgment normal.           Significant Labs:  BMP (Last 3 Results):   Recent Labs   Lab 07/18/20  0638 07/19/20  0417 07/20/20  0441   *  136* 93 141*   *  134* 135* 134*   K 3.5  3.5 3.9 4.3   CL 99  99 100 100   CO2 25  25 29 28   BUN 16  16 17 17   CREATININE 0.9  0.9 0.9 0.9   CALCIUM 8.7  8.7 8.4* 8.7   MG 1.8 1.9 2.0     CBC (Last 3 Results):   Recent Labs   Lab 07/18/20  0638 07/19/20  0417 07/20/20  0441   WBC 8.62 7.10 7.23   RBC 4.15 3.92* 4.00   HGB 9.8* 9.0* 9.3*   HCT 31.6* 29.0* 30.5*   * 340 337   MCV 76* 74* 76*   MCH 23.6* 23.0* 23.3*   MCHC 31.0* 31.0* 30.5*       Significant Diagnostics:  None    Assessment/Plan:     * Cutaneous fistula  Surgical intervention potentially planned for later in month  Continue TPN  Keep NG to LIMS another day  Pain control  IVF - replace losses    Chronic kidney disease, stage 3  Monitor labs    Essential hypertension  Monitor BP  Home meds    Type 2 diabetes mellitus without complication, with long-term current use of insulin  SSI  Accu check Q6          Eli Paris NP  Colorectal Surgery  [facility]

## 2020-07-20 NOTE — PROGRESS NOTES
Long discussion with patient, son, and daughter-in-law over the phone with the  in person today.    We again reviewed her condition and treatment options.  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  Required NG tube placement after oral contrast mistakenly given for CT scan.  She continues to pass flatus, but has not had a bowel movement since that time.    Unclear what her initial pathology was in [place], diverticulitis versus Crohn's disease.  Did not have any evidence of Crohn's on recent colonoscopy.  Regardless, she underwent a Aiyana procedure.  She has undergone multiple wound management procedures, and ultimately an open reversal of her colostomy in November of 2016.  She did have adhesions at this time and required a small-bowel resection as well.  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  Barium enema in 2018 demonstrated a complex fistula involving the sigmoid anastomosis and small bowel.  Dr. Horton attempted laparotomy with revision of anastomosis and adhesiolysis in February of 2019, and aborted for frozen abdomen.  Has had multiple admissions over the last year for abdominal pain, nausea, and CT scans demonstrating small bowel obstruction.     We discussed treatment options.  One option would be to attempt surgery, which I currently have scheduled for December 31st.  Discussed that I would attempt to revise her anastomosis and remove the small intestine involved in the fistula.  We discussed that this may put her at high risk for short gut syndrome and need for lifelong TPN.  We also discussed that it may not be possible to leave her bowel in continuity, and that she may end up with a temporary or permanent ostomy.  This may be high output.  We discussed that in the event that she were in fact found to have a frozen abdomen, would  likely place decompressive G-tube.    We also discussed non operative management with chronic TPN and PEG.    She and her family would like to take some time to consider these options.  In the antrum will continue with NG tube as needed for decompression and TPN.    Fabiana Valiente

## 2020-07-20 NOTE — SUBJECTIVE & OBJECTIVE
Subjective:     Interval History: no acute events overnite, NGT intact, tpn infusing    Post-Op Info:  Procedure(s) (LRB):  RESECTION, COLON, LOW ANTERIOR, possible loop ileostomy, ERAS high (N/A)          Medications:  Continuous Infusions:   TPN ADULT CENTRAL LINE CUSTOM 75 mL/hr at 07/19/20 2304    TPN ADULT CENTRAL LINE CUSTOM       Scheduled Meds:   carvediloL  25 mg Oral Daily    enoxaparin  40 mg Subcutaneous Q24H    fat emulsion 20%  250 mL Intravenous Daily    sodium chloride 0.9%  10 mL Intravenous Q6H     PRN Meds:   dextrose 50%    dextrose 50%    glucagon (human recombinant)    glucagon (human recombinant)    HYDROmorphone    HYDROmorphone    insulin aspart U-100    insulin aspart U-100    ondansetron    promethazine (PHENERGAN) IVPB    sodium chloride 0.9%        Objective:     Vital Signs (Most Recent):  Temp: 99 °F (37.2 °C) (07/20/20 0818)  Pulse: 77 (07/20/20 0818)  Resp: 18 (07/20/20 1225)  BP: (!) 143/80 (07/20/20 0818)  SpO2: 97 % (07/20/20 0818) Vital Signs (24h Range):  Temp:  [97.5 °F (36.4 °C)-99 °F (37.2 °C)] 99 °F (37.2 °C)  Pulse:  [72-81] 77  Resp:  [11-18] 18  SpO2:  [97 %-98 %] 97 %  BP: (122-143)/(79-87) 143/80     Intake/Output - Last 3 Shifts       07/18 0700 - 07/19 0659 07/19 0700 - 07/20 0659 07/20 0700 - 07/21 0659    P.O. 0 0     I.V. (mL/kg) 415 (5.9)      NG/GT 40 60 20    IV Piggyback 1000 50      1525     Total Intake(mL/kg) 2355 (33.3) 1635 (23.1) 20 (0.3)    Urine (mL/kg/hr) 1450 (0.9) 2050 (1.2) 800 (1.8)    Drains 700 750     Total Output 2150 2800 800    Net +205 -1165 -780                 Physical Exam  Constitutional:       Appearance: She is well-developed. She is not ill-appearing.   HENT:      Head: Normocephalic.   Eyes:      Pupils: Pupils are equal, round, and reactive to light.   Cardiovascular:      Rate and Rhythm: Normal rate and regular rhythm.      Heart sounds: Normal heart sounds.   Pulmonary:      Effort: Pulmonary effort is  normal. No respiratory distress.      Breath sounds: Normal breath sounds. No wheezing or rales.   Abdominal:      Palpations: Abdomen is soft. There is no mass.      Tenderness: There is no guarding or rebound.      Comments: NGT with greenish output  654cc   Skin:     General: Skin is warm and dry.   Neurological:      Mental Status: She is alert and oriented to person, place, and time.   Psychiatric:         Behavior: Behavior normal.         Thought Content: Thought content normal.         Judgment: Judgment normal.           Significant Labs:  BMP (Last 3 Results):   Recent Labs   Lab 07/18/20  0638 07/19/20 0417 07/20/20  0441   *  136* 93 141*   *  134* 135* 134*   K 3.5  3.5 3.9 4.3   CL 99  99 100 100   CO2 25  25 29 28   BUN 16  16 17 17   CREATININE 0.9  0.9 0.9 0.9   CALCIUM 8.7  8.7 8.4* 8.7   MG 1.8 1.9 2.0     CBC (Last 3 Results):   Recent Labs   Lab 07/18/20  0638 07/19/20 0417 07/20/20  0441   WBC 8.62 7.10 7.23   RBC 4.15 3.92* 4.00   HGB 9.8* 9.0* 9.3*   HCT 31.6* 29.0* 30.5*   * 340 337   MCV 76* 74* 76*   MCH 23.6* 23.0* 23.3*   MCHC 31.0* 31.0* 30.5*       Significant Diagnostics:  None

## 2020-07-21 LAB
ALBUMIN SERPL BCP-MCNC: 2.8 G/DL (ref 3.5–5.2)
ALP SERPL-CCNC: 81 U/L (ref 55–135)
ALT SERPL W/O P-5'-P-CCNC: 11 U/L (ref 10–44)
ANION GAP SERPL CALC-SCNC: 11 MMOL/L (ref 8–16)
ANION GAP SERPL CALC-SCNC: 11 MMOL/L (ref 8–16)
AST SERPL-CCNC: 14 U/L (ref 10–40)
BASOPHILS # BLD AUTO: 0.04 K/UL (ref 0–0.2)
BASOPHILS NFR BLD: 0.5 % (ref 0–1.9)
BILIRUB SERPL-MCNC: 0.2 MG/DL (ref 0.1–1)
BUN SERPL-MCNC: 20 MG/DL (ref 8–23)
BUN SERPL-MCNC: 20 MG/DL (ref 8–23)
CALCIUM SERPL-MCNC: 9.2 MG/DL (ref 8.7–10.5)
CALCIUM SERPL-MCNC: 9.2 MG/DL (ref 8.7–10.5)
CHLORIDE SERPL-SCNC: 97 MMOL/L (ref 95–110)
CHLORIDE SERPL-SCNC: 97 MMOL/L (ref 95–110)
CO2 SERPL-SCNC: 24 MMOL/L (ref 23–29)
CO2 SERPL-SCNC: 24 MMOL/L (ref 23–29)
CREAT SERPL-MCNC: 0.9 MG/DL (ref 0.5–1.4)
CREAT SERPL-MCNC: 0.9 MG/DL (ref 0.5–1.4)
DIFFERENTIAL METHOD: ABNORMAL
EOSINOPHIL # BLD AUTO: 0.4 K/UL (ref 0–0.5)
EOSINOPHIL NFR BLD: 4 % (ref 0–8)
ERYTHROCYTE [DISTWIDTH] IN BLOOD BY AUTOMATED COUNT: 16.6 % (ref 11.5–14.5)
EST. GFR  (AFRICAN AMERICAN): >60 ML/MIN/1.73 M^2
EST. GFR  (AFRICAN AMERICAN): >60 ML/MIN/1.73 M^2
EST. GFR  (NON AFRICAN AMERICAN): >60 ML/MIN/1.73 M^2
EST. GFR  (NON AFRICAN AMERICAN): >60 ML/MIN/1.73 M^2
GLUCOSE SERPL-MCNC: 172 MG/DL (ref 70–110)
GLUCOSE SERPL-MCNC: 172 MG/DL (ref 70–110)
HCT VFR BLD AUTO: 31.7 % (ref 37–48.5)
HGB BLD-MCNC: 10.1 G/DL (ref 12–16)
IMM GRANULOCYTES # BLD AUTO: 0.03 K/UL (ref 0–0.04)
IMM GRANULOCYTES NFR BLD AUTO: 0.3 % (ref 0–0.5)
LYMPHOCYTES # BLD AUTO: 1.5 K/UL (ref 1–4.8)
LYMPHOCYTES NFR BLD: 16.7 % (ref 18–48)
MAGNESIUM SERPL-MCNC: 2 MG/DL (ref 1.6–2.6)
MAGNESIUM SERPL-MCNC: 2 MG/DL (ref 1.6–2.6)
MCH RBC QN AUTO: 23.9 PG (ref 27–31)
MCHC RBC AUTO-ENTMCNC: 31.9 G/DL (ref 32–36)
MCV RBC AUTO: 75 FL (ref 82–98)
MONOCYTES # BLD AUTO: 0.6 K/UL (ref 0.3–1)
MONOCYTES NFR BLD: 6.6 % (ref 4–15)
NEUTROPHILS # BLD AUTO: 6.2 K/UL (ref 1.8–7.7)
NEUTROPHILS NFR BLD: 71.9 % (ref 38–73)
NRBC BLD-RTO: 0 /100 WBC
PHOSPHATE SERPL-MCNC: 3.4 MG/DL (ref 2.7–4.5)
PLATELET # BLD AUTO: 385 K/UL (ref 150–350)
PMV BLD AUTO: 8.7 FL (ref 9.2–12.9)
POCT GLUCOSE: 114 MG/DL (ref 70–110)
POCT GLUCOSE: 131 MG/DL (ref 70–110)
POCT GLUCOSE: 162 MG/DL (ref 70–110)
POCT GLUCOSE: 182 MG/DL (ref 70–110)
POTASSIUM SERPL-SCNC: 4.3 MMOL/L (ref 3.5–5.1)
POTASSIUM SERPL-SCNC: 4.3 MMOL/L (ref 3.5–5.1)
PROT SERPL-MCNC: 8 G/DL (ref 6–8.4)
RBC # BLD AUTO: 4.22 M/UL (ref 4–5.4)
SODIUM SERPL-SCNC: 132 MMOL/L (ref 136–145)
SODIUM SERPL-SCNC: 132 MMOL/L (ref 136–145)
WBC # BLD AUTO: 8.67 K/UL (ref 3.9–12.7)

## 2020-07-21 PROCEDURE — 25000003 PHARM REV CODE 250: Performed by: STUDENT IN AN ORGANIZED HEALTH CARE EDUCATION/TRAINING PROGRAM

## 2020-07-21 PROCEDURE — 63600175 PHARM REV CODE 636 W HCPCS: Performed by: STUDENT IN AN ORGANIZED HEALTH CARE EDUCATION/TRAINING PROGRAM

## 2020-07-21 PROCEDURE — A4217 STERILE WATER/SALINE, 500 ML: HCPCS | Performed by: NURSE PRACTITIONER

## 2020-07-21 PROCEDURE — 85025 COMPLETE CBC W/AUTO DIFF WBC: CPT

## 2020-07-21 PROCEDURE — 94761 N-INVAS EAR/PLS OXIMETRY MLT: CPT

## 2020-07-21 PROCEDURE — 80053 COMPREHEN METABOLIC PANEL: CPT

## 2020-07-21 PROCEDURE — 63600175 PHARM REV CODE 636 W HCPCS: Performed by: NURSE PRACTITIONER

## 2020-07-21 PROCEDURE — 25000003 PHARM REV CODE 250: Performed by: COLON & RECTAL SURGERY

## 2020-07-21 PROCEDURE — 11000001 HC ACUTE MED/SURG PRIVATE ROOM

## 2020-07-21 PROCEDURE — A4216 STERILE WATER/SALINE, 10 ML: HCPCS | Performed by: COLON & RECTAL SURGERY

## 2020-07-21 PROCEDURE — 84100 ASSAY OF PHOSPHORUS: CPT

## 2020-07-21 PROCEDURE — 36415 COLL VENOUS BLD VENIPUNCTURE: CPT

## 2020-07-21 PROCEDURE — 25000003 PHARM REV CODE 250: Performed by: SURGERY

## 2020-07-21 PROCEDURE — 83735 ASSAY OF MAGNESIUM: CPT

## 2020-07-21 PROCEDURE — 25000003 PHARM REV CODE 250: Performed by: NURSE PRACTITIONER

## 2020-07-21 RX ADMIN — ENOXAPARIN SODIUM 40 MG: 100 INJECTION SUBCUTANEOUS at 05:07

## 2020-07-21 RX ADMIN — MAGNESIUM SULFATE HEPTAHYDRATE: 500 INJECTION, SOLUTION INTRAMUSCULAR; INTRAVENOUS at 10:07

## 2020-07-21 RX ADMIN — HYDROMORPHONE HYDROCHLORIDE 0.5 MG: 1 INJECTION, SOLUTION INTRAMUSCULAR; INTRAVENOUS; SUBCUTANEOUS at 03:07

## 2020-07-21 RX ADMIN — HYDROMORPHONE HYDROCHLORIDE 0.5 MG: 1 INJECTION, SOLUTION INTRAMUSCULAR; INTRAVENOUS; SUBCUTANEOUS at 12:07

## 2020-07-21 RX ADMIN — ONDANSETRON 8 MG: 2 INJECTION INTRAMUSCULAR; INTRAVENOUS at 09:07

## 2020-07-21 RX ADMIN — CARVEDILOL 25 MG: 25 TABLET, FILM COATED ORAL at 08:07

## 2020-07-21 RX ADMIN — TOPICAL ANESTHETIC: 200 SPRAY DENTAL; PERIODONTAL at 10:07

## 2020-07-21 RX ADMIN — Medication 10 ML: at 12:07

## 2020-07-21 RX ADMIN — HYDROMORPHONE HYDROCHLORIDE 0.5 MG: 1 INJECTION, SOLUTION INTRAMUSCULAR; INTRAVENOUS; SUBCUTANEOUS at 06:07

## 2020-07-21 RX ADMIN — ONDANSETRON 8 MG: 2 INJECTION INTRAMUSCULAR; INTRAVENOUS at 08:07

## 2020-07-21 RX ADMIN — ONDANSETRON 8 MG: 2 INJECTION INTRAMUSCULAR; INTRAVENOUS at 03:07

## 2020-07-21 RX ADMIN — HYDROMORPHONE HYDROCHLORIDE 0.8 MG: 1 INJECTION, SOLUTION INTRAMUSCULAR; INTRAVENOUS; SUBCUTANEOUS at 09:07

## 2020-07-21 RX ADMIN — Medication 10 ML: at 06:07

## 2020-07-21 RX ADMIN — HYDROMORPHONE HYDROCHLORIDE 0.5 MG: 1 INJECTION, SOLUTION INTRAMUSCULAR; INTRAVENOUS; SUBCUTANEOUS at 08:07

## 2020-07-21 NOTE — SUBJECTIVE & OBJECTIVE
Subjective:     Interval History: no acute evetns overnite, NGT in place, no flatus or bm, decrease in ecf drainage    Post-Op Info:  Procedure(s) (LRB):  RESECTION, COLON, LOW ANTERIOR, possible loop ileostomy, ERAS high (N/A)          Medications:  Continuous Infusions:   TPN ADULT CENTRAL LINE CUSTOM 75 mL/hr at 07/20/20 2228    TPN ADULT CENTRAL LINE CUSTOM       Scheduled Meds:   carvediloL  25 mg Oral Daily    enoxaparin  40 mg Subcutaneous Q24H    sodium chloride 0.9%  10 mL Intravenous Q6H     PRN Meds:   benzocaine    dextrose 50%    dextrose 50%    glucagon (human recombinant)    glucagon (human recombinant)    HYDROmorphone    HYDROmorphone    insulin aspart U-100    insulin aspart U-100    ondansetron    promethazine (PHENERGAN) IVPB    sodium chloride 0.9%        Objective:     Vital Signs (Most Recent):  Temp: 98.4 °F (36.9 °C) (07/21/20 0811)  Pulse: 91 (07/21/20 0319)  Resp: 18 (07/21/20 0811)  BP: (!) 143/72 (07/21/20 0811)  SpO2: 96 % (07/21/20 0319) Vital Signs (24h Range):  Temp:  [97.8 °F (36.6 °C)-98.4 °F (36.9 °C)] 98.4 °F (36.9 °C)  Pulse:  [83-91] 91  Resp:  [16-20] 18  SpO2:  [96 %-97 %] 96 %  BP: (140-144)/(72-82) 143/72     Intake/Output - Last 3 Shifts       07/19 0700 - 07/20 0659 07/20 0700 - 07/21 0659 07/21 0700 - 07/22 0659    P.O. 0      I.V. (mL/kg)       NG/GT 60 810     IV Piggyback 50      TPN 1525      Total Intake(mL/kg) 1635 (23.1) 810 (11.5)     Urine (mL/kg/hr) 2050 (1.2) 1800 (1.1)     Drains 750 50     Total Output 2800 1850     Net -1165 -1040                  Physical Exam  Constitutional:       Appearance: She is well-developed. She is not ill-appearing.   HENT:      Head: Normocephalic.   Eyes:      Pupils: Pupils are equal, round, and reactive to light.   Cardiovascular:      Rate and Rhythm: Normal rate and regular rhythm.      Heart sounds: Normal heart sounds.   Pulmonary:      Effort: Pulmonary effort is normal. No respiratory distress.       Breath sounds: Normal breath sounds. No wheezing or rales.   Abdominal:      Palpations: Abdomen is soft. There is no mass.      Tenderness: There is no guarding or rebound.      Comments: NGT with greenish drainage   Skin:     General: Skin is warm and dry.   Neurological:      Mental Status: She is alert and oriented to person, place, and time.   Psychiatric:         Behavior: Behavior normal.         Thought Content: Thought content normal.         Judgment: Judgment normal.           Significant Labs:  BMP (Last 3 Results):   Recent Labs   Lab 07/18/20  0638 07/19/20 0417 07/20/20  0441   *  136* 93 141*   *  134* 135* 134*   K 3.5  3.5 3.9 4.3   CL 99  99 100 100   CO2 25  25 29 28   BUN 16  16 17 17   CREATININE 0.9  0.9 0.9 0.9   CALCIUM 8.7  8.7 8.4* 8.7   MG 1.8 1.9 2.0     CBC (Last 3 Results):   Recent Labs   Lab 07/19/20 0417 07/20/20  0441 07/21/20  0916   WBC 7.10 7.23 8.67   RBC 3.92* 4.00 4.22   HGB 9.0* 9.3* 10.1*   HCT 29.0* 30.5* 31.7*    337 385*   MCV 74* 76* 75*   MCH 23.0* 23.3* 23.9*   MCHC 31.0* 30.5* 31.9*       Significant Diagnostics:  None

## 2020-07-21 NOTE — PHYSICIAN QUERY
PT Name: Azucena Morrison  MR #: [id number]    Consultant Diagnosis Clarification     CDS/: Carmen Gusman RN, CDS               Contact information: [email or web address]    This form is a permanent document in the medical record.    Query Date: July 21, 2020      By submitting this query, we are merely seeking further clarification of documentation.  Please utilize your independent clinical judgment when addressing the question(s) below.    The Medical Record reflects the following:    Clinical Information Location in Medical Record   Pt reported poor PO intake PTA x 1 week due to N/V and wt loss of ~40 lbs. Unsure of time frame or UBW. Chart shows UBW ~165 lbs x 2 years, resulting in 10 lbs wt loss. NFPE completed, pt with mild/moderate muscle and fat wasting, meets criteria for moderate malnutrition    BMI (Calculated): 29.5    Energy Intake: <75% of estimated energy requirement for >1 week    Body Fat Depletion: mild depletion of triceps     Muscle Mass Depletion: mild and moderate depletion of temples, clavicle region, interosseous muscle and lower extremities     Weight Loss: 6% x unsure of time frame per pt     Recommendation:  1. 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    Nutrition PN 7/17              Nutrition PN 7/17    Nutrition PN 7/17      Nutrition PN 7/17    Nutrition PN 7/17        Nutrition PN 7/17    Nutrition PN 7/17          H&P 7/15         Please clarify/confirm the Consultants diagnosis of _moderate malnutrition_:     [x  ] Diagnosis ruled in   [  ] Diagnosis ruled out   [  ] Other diagnosis (please specify): _____________________________   [  ] Clinically undetermined     Present on admission (POA)  status:   [   ] Yes (Y)                          [  ] Clinically Undetermined (W)  [   ] No (N)                            [   ] Documentation insufficient to determine if condition is POA (U)

## 2020-07-21 NOTE — PLAN OF CARE
07/20/20 0958   Discharge Reassessment   Assessment Type Discharge Planning Reassessment   Provided patient/caregiver education on the expected discharge date and the discharge plan Yes   Do you have any problems affording any of your prescribed medications? No   Discharge Plan A Home with family   Discharge Plan B Home Health   DME Needed Upon Discharge  none   Anticipated Discharge Disposition Home   Can the patient/caregiver answer the patient profile reliably? Yes, cognitively intact

## 2020-07-22 LAB
ALBUMIN SERPL BCP-MCNC: 2.8 G/DL (ref 3.5–5.2)
ALP SERPL-CCNC: 85 U/L (ref 55–135)
ALT SERPL W/O P-5'-P-CCNC: 13 U/L (ref 10–44)
ANION GAP SERPL CALC-SCNC: 11 MMOL/L (ref 8–16)
ANION GAP SERPL CALC-SCNC: 11 MMOL/L (ref 8–16)
AST SERPL-CCNC: 17 U/L (ref 10–40)
BASOPHILS # BLD AUTO: 0.04 K/UL (ref 0–0.2)
BASOPHILS NFR BLD: 0.5 % (ref 0–1.9)
BILIRUB SERPL-MCNC: 0.2 MG/DL (ref 0.1–1)
BUN SERPL-MCNC: 26 MG/DL (ref 8–23)
BUN SERPL-MCNC: 26 MG/DL (ref 8–23)
CALCIUM SERPL-MCNC: 9.3 MG/DL (ref 8.7–10.5)
CALCIUM SERPL-MCNC: 9.3 MG/DL (ref 8.7–10.5)
CHLORIDE SERPL-SCNC: 95 MMOL/L (ref 95–110)
CHLORIDE SERPL-SCNC: 95 MMOL/L (ref 95–110)
CO2 SERPL-SCNC: 29 MMOL/L (ref 23–29)
CO2 SERPL-SCNC: 29 MMOL/L (ref 23–29)
CREAT SERPL-MCNC: 1.1 MG/DL (ref 0.5–1.4)
CREAT SERPL-MCNC: 1.1 MG/DL (ref 0.5–1.4)
DIFFERENTIAL METHOD: ABNORMAL
EOSINOPHIL # BLD AUTO: 0.4 K/UL (ref 0–0.5)
EOSINOPHIL NFR BLD: 4.3 % (ref 0–8)
ERYTHROCYTE [DISTWIDTH] IN BLOOD BY AUTOMATED COUNT: 16.7 % (ref 11.5–14.5)
EST. GFR  (AFRICAN AMERICAN): 59.2 ML/MIN/1.73 M^2
EST. GFR  (AFRICAN AMERICAN): 59.2 ML/MIN/1.73 M^2
EST. GFR  (NON AFRICAN AMERICAN): 51.3 ML/MIN/1.73 M^2
EST. GFR  (NON AFRICAN AMERICAN): 51.3 ML/MIN/1.73 M^2
GLUCOSE SERPL-MCNC: 152 MG/DL (ref 70–110)
GLUCOSE SERPL-MCNC: 152 MG/DL (ref 70–110)
HCT VFR BLD AUTO: 32.5 % (ref 37–48.5)
HGB BLD-MCNC: 9.8 G/DL (ref 12–16)
IMM GRANULOCYTES # BLD AUTO: 0.02 K/UL (ref 0–0.04)
IMM GRANULOCYTES NFR BLD AUTO: 0.2 % (ref 0–0.5)
LYMPHOCYTES # BLD AUTO: 2 K/UL (ref 1–4.8)
LYMPHOCYTES NFR BLD: 23 % (ref 18–48)
MAGNESIUM SERPL-MCNC: 2.3 MG/DL (ref 1.6–2.6)
MAGNESIUM SERPL-MCNC: 2.3 MG/DL (ref 1.6–2.6)
MCH RBC QN AUTO: 23.1 PG (ref 27–31)
MCHC RBC AUTO-ENTMCNC: 30.2 G/DL (ref 32–36)
MCV RBC AUTO: 77 FL (ref 82–98)
MONOCYTES # BLD AUTO: 0.8 K/UL (ref 0.3–1)
MONOCYTES NFR BLD: 9.3 % (ref 4–15)
NEUTROPHILS # BLD AUTO: 5.4 K/UL (ref 1.8–7.7)
NEUTROPHILS NFR BLD: 62.7 % (ref 38–73)
NRBC BLD-RTO: 0 /100 WBC
PHOSPHATE SERPL-MCNC: 4.6 MG/DL (ref 2.7–4.5)
PHOSPHATE SERPL-MCNC: 4.6 MG/DL (ref 2.7–4.5)
PLATELET # BLD AUTO: 372 K/UL (ref 150–350)
PMV BLD AUTO: 8.7 FL (ref 9.2–12.9)
POCT GLUCOSE: 170 MG/DL (ref 70–110)
POCT GLUCOSE: 171 MG/DL (ref 70–110)
POCT GLUCOSE: 173 MG/DL (ref 70–110)
POTASSIUM SERPL-SCNC: 3.8 MMOL/L (ref 3.5–5.1)
POTASSIUM SERPL-SCNC: 3.8 MMOL/L (ref 3.5–5.1)
PROT SERPL-MCNC: 8 G/DL (ref 6–8.4)
RBC # BLD AUTO: 4.25 M/UL (ref 4–5.4)
SODIUM SERPL-SCNC: 135 MMOL/L (ref 136–145)
SODIUM SERPL-SCNC: 135 MMOL/L (ref 136–145)
WBC # BLD AUTO: 8.58 K/UL (ref 3.9–12.7)

## 2020-07-22 PROCEDURE — 84100 ASSAY OF PHOSPHORUS: CPT

## 2020-07-22 PROCEDURE — 25000003 PHARM REV CODE 250: Performed by: COLON & RECTAL SURGERY

## 2020-07-22 PROCEDURE — 63600175 PHARM REV CODE 636 W HCPCS: Performed by: STUDENT IN AN ORGANIZED HEALTH CARE EDUCATION/TRAINING PROGRAM

## 2020-07-22 PROCEDURE — A4217 STERILE WATER/SALINE, 500 ML: HCPCS | Performed by: NURSE PRACTITIONER

## 2020-07-22 PROCEDURE — 25000003 PHARM REV CODE 250: Performed by: STUDENT IN AN ORGANIZED HEALTH CARE EDUCATION/TRAINING PROGRAM

## 2020-07-22 PROCEDURE — 25000003 PHARM REV CODE 250: Performed by: NURSE PRACTITIONER

## 2020-07-22 PROCEDURE — 63600175 PHARM REV CODE 636 W HCPCS: Performed by: NURSE PRACTITIONER

## 2020-07-22 PROCEDURE — 83735 ASSAY OF MAGNESIUM: CPT

## 2020-07-22 PROCEDURE — 11000001 HC ACUTE MED/SURG PRIVATE ROOM

## 2020-07-22 PROCEDURE — 80053 COMPREHEN METABOLIC PANEL: CPT

## 2020-07-22 PROCEDURE — 85025 COMPLETE CBC W/AUTO DIFF WBC: CPT

## 2020-07-22 PROCEDURE — A4216 STERILE WATER/SALINE, 10 ML: HCPCS | Performed by: COLON & RECTAL SURGERY

## 2020-07-22 RX ADMIN — HYDROMORPHONE HYDROCHLORIDE 0.8 MG: 1 INJECTION, SOLUTION INTRAMUSCULAR; INTRAVENOUS; SUBCUTANEOUS at 03:07

## 2020-07-22 RX ADMIN — ONDANSETRON 8 MG: 2 INJECTION INTRAMUSCULAR; INTRAVENOUS at 03:07

## 2020-07-22 RX ADMIN — HYDROMORPHONE HYDROCHLORIDE 0.5 MG: 1 INJECTION, SOLUTION INTRAMUSCULAR; INTRAVENOUS; SUBCUTANEOUS at 12:07

## 2020-07-22 RX ADMIN — HYDROMORPHONE HYDROCHLORIDE 0.5 MG: 1 INJECTION, SOLUTION INTRAMUSCULAR; INTRAVENOUS; SUBCUTANEOUS at 09:07

## 2020-07-22 RX ADMIN — Medication 10 ML: at 12:07

## 2020-07-22 RX ADMIN — HYDROMORPHONE HYDROCHLORIDE 0.5 MG: 1 INJECTION, SOLUTION INTRAMUSCULAR; INTRAVENOUS; SUBCUTANEOUS at 06:07

## 2020-07-22 RX ADMIN — ONDANSETRON 8 MG: 2 INJECTION INTRAMUSCULAR; INTRAVENOUS at 12:07

## 2020-07-22 RX ADMIN — HYDROMORPHONE HYDROCHLORIDE 0.5 MG: 1 INJECTION, SOLUTION INTRAMUSCULAR; INTRAVENOUS; SUBCUTANEOUS at 07:07

## 2020-07-22 RX ADMIN — MAGNESIUM SULFATE HEPTAHYDRATE: 500 INJECTION, SOLUTION INTRAMUSCULAR; INTRAVENOUS at 10:07

## 2020-07-22 RX ADMIN — HYDROMORPHONE HYDROCHLORIDE 0.5 MG: 1 INJECTION, SOLUTION INTRAMUSCULAR; INTRAVENOUS; SUBCUTANEOUS at 03:07

## 2020-07-22 RX ADMIN — ONDANSETRON 8 MG: 2 INJECTION INTRAMUSCULAR; INTRAVENOUS at 10:07

## 2020-07-22 RX ADMIN — ENOXAPARIN SODIUM 40 MG: 100 INJECTION SUBCUTANEOUS at 04:07

## 2020-07-22 RX ADMIN — CARVEDILOL 25 MG: 25 TABLET, FILM COATED ORAL at 08:07

## 2020-07-22 RX ADMIN — Medication 10 ML: at 06:07

## 2020-07-22 RX ADMIN — HYDROMORPHONE HYDROCHLORIDE 0.8 MG: 1 INJECTION, SOLUTION INTRAMUSCULAR; INTRAVENOUS; SUBCUTANEOUS at 10:07

## 2020-07-22 NOTE — PROGRESS NOTES
"[facility]  Adult Nutrition  Progress Note    SUMMARY       Recommendations    Pt meets criteria for acute moderate malnutrition.     1. Current TPN exceeding EEN.    Recommend Custom TPN 90 gm AA / 215 gm Dex + IV lipids to provide 1591 kcal, 90 gm protein, and GIR of 2.11.     2. As medically able, ADAT to diabetic diet texture per SLP.     3.Suggest adding Vitamin C, MVI, and zinc to aid in wound healing.     4. RD following.     Goals: pt to tolerate >85% of EEN/EPN by RD follow up  Nutrition Goal Status: goal met  Communication of RD Recs: (POC)    Reason for Assessment    Reason For Assessment: RD follow-up  Diagnosis: (cutaneous fistula)  Relevant Medical History: DM; HTN; CKD3  Interdisciplinary Rounds: did not attend  General Information Comments: Per NP note, surgical intervention potentially planned for later in month. NPO, NG tube, TPN + IV lipids infusing. No N/V reported. NFPE completed 7/17. Pt with mild-moderate muscle/fat wasting. Pt meets criteria for acute moderate malnutrition.   Nutrition Discharge Planning: unable to determine    Nutrition Risk Screen    Nutrition Risk Screen: tube feeding or parenteral nutrition    Nutrition/Diet History    Food Allergies: NKFA  Factors Affecting Nutritional Intake: NPO, decreased appetite, nausea/vomiting    Anthropometrics    Temp: 97.3 °F (36.3 °C)  Height Method: Stated  Height: 5' 1" (154.9 cm)  Height (inches): 61 in  Weight Method: Bed Scale  Weight: 70.7 kg (155 lb 13.8 oz)  Weight (lb): 155.87 lb  Ideal Body Weight (IBW), Female: 105 lb  % Ideal Body Weight, Female (lb): 148.45 %  BMI (Calculated): 29.5  BMI Grade: 25 - 29.9 - overweight     Lab/Procedures/Meds    Pertinent Labs Reviewed: reviewed  Pertinent Labs Comments: , BUN 26, GFR 51.3, glucose 152, phos 4.6  Pertinent Medications Reviewed: reviewed  Pertinent Medications Comments: carvedilol  Estimated/Assessed Needs    Weight Used For Calorie Calculations: 70.7 kg " (155 lb 13.8 oz)  Energy Calorie Requirements (kcal): 1461 kcal/day  Energy Need Method: [place]-St Jeor(x 1.25)  Protein Requirements: 85-99 g/day  Weight Used For Protein Calculations: 70.7 kg (155 lb 13.8 oz)  Fluid Requirements (mL): 1 mL/kcal or per MD     RDA Method (mL): 1461  CHO Requirement: 175      Nutrition Prescription Ordered    Current Diet Order: NPO  Current Nutrition Support Formula Ordered: (Custom TPN 90 gm AA / 270 gm Dex + IV lipids)  Current Nutrition Support Rate Ordered: 75 (ml)  Current Nutrition Support Frequency Ordered: mL/hr    Evaluation of Received Nutrient/Fluid Intake    Parenteral Calories (kcal): 1278  Parenteral Protein (gm): 90  Parenteral Fluid (mL): 1800  Lipid Calories (kcals): 500 kcals  GIR (Glucose Infusion Rate) (mg/kg/min): 2.65 mg/kg/min  Total Calories (kcal): 1778  % Kcal Needs: 122  % Protein Needs: 100  I/O: -5.39L since admit  Energy Calories Required: exceeds needs  Protein Required: meeting needs  Fluid Required: (per MD)  Comments: LBM 7/17  Tolerance: tolerating  % Intake of Estimated Energy Needs: Other: >100%  % Meal Intake: NPO    Nutrition Risk    Level of Risk/Frequency of Follow-up: (f/u 1 x wk)     Assessment and Plan    Nutrition Problem:  Moderate Protein-Calorie Malnutrition  Malnutrition in the context of Acute Illness/Injury     Related to (etiology):  Decreased intake      Signs and Symptoms (as evidenced by):  Energy Intake: <75% of estimated energy requirement for >1 week  Body Fat Depletion: mild depletion of triceps   Muscle Mass Depletion: mild and moderate depletion of temples, clavicle region, interosseous muscle and lower extremities   Weight Loss: 6% x unsure of time frame per pt      Interventions (treatment strategy):  Collaboration of care with other providers  Parenteral nutrition     Nutrition Diagnosis Status:  Improving     Monitor and Evaluation    Food and Nutrient Intake: parenteral nutrition intake  Food and Nutrient  Adminstration: enteral and parenteral nutrition administration  Anthropometric Measurements: weight, weight change  Biochemical Data, Medical Tests and Procedures: electrolyte and renal panel, gastrointestinal profile, glucose/endocrine profile, lipid profile, inflammatory profile  Nutrition-Focused Physical Findings: overall appearance     Malnutrition Assessment  Malnutrition Type: acute illness or injury          Weight Loss (Malnutrition): (6% unsure of time frame)  Energy Intake (Malnutrition): less than 75% for greater than 7 days  Subcutaneous Fat (Malnutrition): moderate depletion  Muscle Mass (Malnutrition): moderate depletion   Orbital Region (Subcutaneous Fat Loss): mild depletion  Upper Arm Region (Subcutaneous Fat Loss): moderate depletion   Latter-day Region (Muscle Loss): mild depletion  Clavicle Bone Region (Muscle Loss): mild depletion  Clavicle and Acromion Bone Region (Muscle Loss): mild depletion  Dorsal Hand (Muscle Loss): mild depletion  Anterior Thigh Region (Muscle Loss): moderate depletion  Posterior Calf Region (Muscle Loss): moderate depletion   Edema (Fluid Accumulation): 0-->no edema present             Nutrition Follow-Up    RD Follow-up?: Yes

## 2020-07-22 NOTE — SUBJECTIVE & OBJECTIVE
Subjective:     Interval History: no acute events overnite, less drainage from ECF, NGT remains in place    Post-Op Info:  Procedure(s) (LRB):  RESECTION, COLON, LOW ANTERIOR, possible loop ileostomy, ERAS high (N/A)          Medications:  Continuous Infusions:   TPN ADULT CENTRAL LINE CUSTOM 75 mL/hr at 07/21/20 2215    TPN ADULT CENTRAL LINE CUSTOM       Scheduled Meds:   carvediloL  25 mg Oral Daily    enoxaparin  40 mg Subcutaneous Q24H    sodium chloride 0.9%  10 mL Intravenous Q6H     PRN Meds:   benzocaine    dextrose 50%    dextrose 50%    glucagon (human recombinant)    glucagon (human recombinant)    HYDROmorphone    HYDROmorphone    insulin aspart U-100    insulin aspart U-100    ondansetron    promethazine (PHENERGAN) IVPB    sodium chloride 0.9%        Objective:     Vital Signs (Most Recent):  Temp: 97.3 °F (36.3 °C) (07/22/20 0750)  Pulse: 90 (07/22/20 0750)  Resp: 18 (07/22/20 0930)  BP: 122/68 (07/22/20 0750)  SpO2: 98 % (07/22/20 0750) Vital Signs (24h Range):  Temp:  [97 °F (36.1 °C)-97.9 °F (36.6 °C)] 97.3 °F (36.3 °C)  Pulse:  [] 90  Resp:  [17-26] 18  SpO2:  [97 %-99 %] 98 %  BP: (116-122)/(66-70) 122/68     Intake/Output - Last 3 Shifts       07/20 0700 - 07/21 0659 07/21 0700 - 07/22 0659 07/22 0700 - 07/23 0659    P.O.  0     NG/ 60     IV Piggyback       TPN       Total Intake(mL/kg) 810 (11.5) 60 (0.8)     Urine (mL/kg/hr) 1800 (1.1) 400 (0.2)     Emesis/NG output  0     Drains 50 200     Stool  0     Blood  0     Total Output 1850 600     Net -1040 -540                  Physical Exam  Constitutional:       Appearance: She is well-developed. She is not ill-appearing.   HENT:      Head: Normocephalic.   Eyes:      Pupils: Pupils are equal, round, and reactive to light.   Cardiovascular:      Rate and Rhythm: Normal rate and regular rhythm.      Heart sounds: Normal heart sounds.   Pulmonary:      Effort: Pulmonary effort is normal. No respiratory distress.       Breath sounds: Normal breath sounds. No wheezing or rales.   Abdominal:      Palpations: Abdomen is soft. There is no mass.      Tenderness: There is no guarding or rebound.   Skin:     General: Skin is warm and dry.   Neurological:      Mental Status: She is alert and oriented to person, place, and time.   Psychiatric:         Behavior: Behavior normal.         Thought Content: Thought content normal.         Judgment: Judgment normal.           Significant Labs:  BMP (Last 3 Results):   Recent Labs   Lab 07/20/20 0441 07/21/20  0916 07/22/20  0330   * 172*  172* 152*  152*   * 132*  132* 135*  135*   K 4.3 4.3  4.3 3.8  3.8    97  97 95  95   CO2 28 24  24 29  29   BUN 17 20  20 26*  26*   CREATININE 0.9 0.9  0.9 1.1  1.1   CALCIUM 8.7 9.2  9.2 9.3  9.3   MG 2.0 2.0  2.0 2.3  2.3     CBC (Last 3 Results):   Recent Labs   Lab 07/20/20 0441 07/21/20  0916 07/22/20  0330   WBC 7.23 8.67 8.58   RBC 4.00 4.22 4.25   HGB 9.3* 10.1* 9.8*   HCT 30.5* 31.7* 32.5*    385* 372*   MCV 76* 75* 77*   MCH 23.3* 23.9* 23.1*   MCHC 30.5* 31.9* 30.2*       Significant Diagnostics:  None

## 2020-07-22 NOTE — PLAN OF CARE
Problem: Parenteral Nutrition  Goal: Effective Intravenous Nutrition Therapy Delivery  Outcome: Ongoing, Progressing     Problem: Malnutrition  Goal: Improved Nutritional Intake  Outcome: Ongoing, Progressing       Recommendations    Pt meets criteria for acute moderate malnutrition.     1. Current TPN exceeding EEN.    Recommend Custom TPN 90 gm AA / 215 gm Dex + IV lipids to provide 1591 kcal, 90 gm protein, and GIR of 2.11.     2. As medically able, ADAT to diabetic diet texture per SLP.     3.Suggest adding Vitamin C, MVI, and zinc to aid in wound healing.     4. RD following.     Goals: pt to tolerate >85% of EEN/EPN by RD follow up  Nutrition Goal Status: goal met

## 2020-07-22 NOTE — PROGRESS NOTES
[facility]  Colorectal Surgery  Progress Note    Patient Name: Azucena Morrison  MRN: [id number]  Admission Date: 7/15/2020  Hospital Length of Stay: 7 days  Attending Physician: Fabiana Valiente MD    Subjective:     Interval History: no acute events overnite, less drainage from ECF, NGT remains in place    Post-Op Info:  Procedure(s) (LRB):  RESECTION, COLON, LOW ANTERIOR, possible loop ileostomy, ERAS high (N/A)          Medications:  Continuous Infusions:   TPN ADULT CENTRAL LINE CUSTOM 75 mL/hr at 07/21/20 2215    TPN ADULT CENTRAL LINE CUSTOM       Scheduled Meds:   carvediloL  25 mg Oral Daily    enoxaparin  40 mg Subcutaneous Q24H    sodium chloride 0.9%  10 mL Intravenous Q6H     PRN Meds:   benzocaine    dextrose 50%    dextrose 50%    glucagon (human recombinant)    glucagon (human recombinant)    HYDROmorphone    HYDROmorphone    insulin aspart U-100    insulin aspart U-100    ondansetron    promethazine (PHENERGAN) IVPB    sodium chloride 0.9%        Objective:     Vital Signs (Most Recent):  Temp: 97.3 °F (36.3 °C) (07/22/20 0750)  Pulse: 90 (07/22/20 0750)  Resp: 18 (07/22/20 0930)  BP: 122/68 (07/22/20 0750)  SpO2: 98 % (07/22/20 0750) Vital Signs (24h Range):  Temp:  [97 °F (36.1 °C)-97.9 °F (36.6 °C)] 97.3 °F (36.3 °C)  Pulse:  [] 90  Resp:  [17-26] 18  SpO2:  [97 %-99 %] 98 %  BP: (116-122)/(66-70) 122/68     Intake/Output - Last 3 Shifts       07/20 0700 - 07/21 0659 07/21 0700 - 07/22 0659 07/22 0700 - 07/23 0659    P.O.  0     NG/ 60     IV Piggyback       TPN       Total Intake(mL/kg) 810 (11.5) 60 (0.8)     Urine (mL/kg/hr) 1800 (1.1) 400 (0.2)     Emesis/NG output  0     Drains 50 200     Stool  0     Blood  0     Total Output 1850 600     Net -1040 -540                  Physical Exam  Constitutional:       Appearance: She is well-developed. She is not ill-appearing.   HENT:      Head: Normocephalic.   Eyes:      Pupils: Pupils are equal,  round, and reactive to light.   Cardiovascular:      Rate and Rhythm: Normal rate and regular rhythm.      Heart sounds: Normal heart sounds.   Pulmonary:      Effort: Pulmonary effort is normal. No respiratory distress.      Breath sounds: Normal breath sounds. No wheezing or rales.   Abdominal:      Palpations: Abdomen is soft. There is no mass.      Tenderness: There is no guarding or rebound.   Skin:     General: Skin is warm and dry.   Neurological:      Mental Status: She is alert and oriented to person, place, and time.   Psychiatric:         Behavior: Behavior normal.         Thought Content: Thought content normal.         Judgment: Judgment normal.           Significant Labs:  BMP (Last 3 Results):   Recent Labs   Lab 07/20/20  0441 07/21/20  0916 07/22/20  0330   * 172*  172* 152*  152*   * 132*  132* 135*  135*   K 4.3 4.3  4.3 3.8  3.8    97  97 95  95   CO2 28 24  24 29  29   BUN 17 20  20 26*  26*   CREATININE 0.9 0.9  0.9 1.1  1.1   CALCIUM 8.7 9.2  9.2 9.3  9.3   MG 2.0 2.0  2.0 2.3  2.3     CBC (Last 3 Results):   Recent Labs   Lab 07/20/20  0441 07/21/20  0916 07/22/20  0330   WBC 7.23 8.67 8.58   RBC 4.00 4.22 4.25   HGB 9.3* 10.1* 9.8*   HCT 30.5* 31.7* 32.5*    385* 372*   MCV 76* 75* 77*   MCH 23.3* 23.9* 23.1*   MCHC 30.5* 31.9* 30.2*       Significant Diagnostics:  None    Assessment/Plan:     * Cutaneous fistula  Surgical intervention potentially planned for later in month  Continue TPN  Keep NG to LIMS another day  Pain control  IVF - replace losses    Chronic kidney disease, stage 3  Monitor labs    Essential hypertension  Monitor BP  Home meds    Type 2 diabetes mellitus without complication, with long-term current use of insulin  SSI  Accu check Q6          Eli Paris NP  Colorectal Surgery  [facility]

## 2020-07-23 LAB
ALBUMIN SERPL BCP-MCNC: 2.8 G/DL (ref 3.5–5.2)
ALP SERPL-CCNC: 91 U/L (ref 55–135)
ALT SERPL W/O P-5'-P-CCNC: 18 U/L (ref 10–44)
ANION GAP SERPL CALC-SCNC: 8 MMOL/L (ref 8–16)
ANION GAP SERPL CALC-SCNC: 8 MMOL/L (ref 8–16)
AST SERPL-CCNC: 21 U/L (ref 10–40)
BASOPHILS # BLD AUTO: 0.04 K/UL (ref 0–0.2)
BASOPHILS NFR BLD: 0.5 % (ref 0–1.9)
BILIRUB SERPL-MCNC: 0.3 MG/DL (ref 0.1–1)
BUN SERPL-MCNC: 37 MG/DL (ref 8–23)
BUN SERPL-MCNC: 37 MG/DL (ref 8–23)
CALCIUM SERPL-MCNC: 9.5 MG/DL (ref 8.7–10.5)
CALCIUM SERPL-MCNC: 9.5 MG/DL (ref 8.7–10.5)
CHLORIDE SERPL-SCNC: 96 MMOL/L (ref 95–110)
CHLORIDE SERPL-SCNC: 96 MMOL/L (ref 95–110)
CO2 SERPL-SCNC: 32 MMOL/L (ref 23–29)
CO2 SERPL-SCNC: 32 MMOL/L (ref 23–29)
CREAT SERPL-MCNC: 1.2 MG/DL (ref 0.5–1.4)
CREAT SERPL-MCNC: 1.2 MG/DL (ref 0.5–1.4)
DIFFERENTIAL METHOD: ABNORMAL
EOSINOPHIL # BLD AUTO: 0.4 K/UL (ref 0–0.5)
EOSINOPHIL NFR BLD: 4.8 % (ref 0–8)
ERYTHROCYTE [DISTWIDTH] IN BLOOD BY AUTOMATED COUNT: 16.5 % (ref 11.5–14.5)
EST. GFR  (AFRICAN AMERICAN): 53.3 ML/MIN/1.73 M^2
EST. GFR  (AFRICAN AMERICAN): 53.3 ML/MIN/1.73 M^2
EST. GFR  (NON AFRICAN AMERICAN): 46.2 ML/MIN/1.73 M^2
EST. GFR  (NON AFRICAN AMERICAN): 46.2 ML/MIN/1.73 M^2
GLUCOSE SERPL-MCNC: 164 MG/DL (ref 70–110)
GLUCOSE SERPL-MCNC: 164 MG/DL (ref 70–110)
HCT VFR BLD AUTO: 33.1 % (ref 37–48.5)
HGB BLD-MCNC: 9.9 G/DL (ref 12–16)
IMM GRANULOCYTES # BLD AUTO: 0.03 K/UL (ref 0–0.04)
IMM GRANULOCYTES NFR BLD AUTO: 0.4 % (ref 0–0.5)
LYMPHOCYTES # BLD AUTO: 2 K/UL (ref 1–4.8)
LYMPHOCYTES NFR BLD: 24.6 % (ref 18–48)
MAGNESIUM SERPL-MCNC: 2.2 MG/DL (ref 1.6–2.6)
MAGNESIUM SERPL-MCNC: 2.2 MG/DL (ref 1.6–2.6)
MCH RBC QN AUTO: 22.8 PG (ref 27–31)
MCHC RBC AUTO-ENTMCNC: 29.9 G/DL (ref 32–36)
MCV RBC AUTO: 76 FL (ref 82–98)
MONOCYTES # BLD AUTO: 0.7 K/UL (ref 0.3–1)
MONOCYTES NFR BLD: 8.2 % (ref 4–15)
NEUTROPHILS # BLD AUTO: 5.1 K/UL (ref 1.8–7.7)
NEUTROPHILS NFR BLD: 61.5 % (ref 38–73)
NRBC BLD-RTO: 0 /100 WBC
PHOSPHATE SERPL-MCNC: 4.8 MG/DL (ref 2.7–4.5)
PHOSPHATE SERPL-MCNC: 4.8 MG/DL (ref 2.7–4.5)
PLATELET # BLD AUTO: 374 K/UL (ref 150–350)
PMV BLD AUTO: 8.4 FL (ref 9.2–12.9)
POCT GLUCOSE: 177 MG/DL (ref 70–110)
POCT GLUCOSE: 182 MG/DL (ref 70–110)
POCT GLUCOSE: 189 MG/DL (ref 70–110)
POTASSIUM SERPL-SCNC: 4.3 MMOL/L (ref 3.5–5.1)
POTASSIUM SERPL-SCNC: 4.3 MMOL/L (ref 3.5–5.1)
PROT SERPL-MCNC: 7.9 G/DL (ref 6–8.4)
RBC # BLD AUTO: 4.35 M/UL (ref 4–5.4)
SODIUM SERPL-SCNC: 136 MMOL/L (ref 136–145)
SODIUM SERPL-SCNC: 136 MMOL/L (ref 136–145)
WBC # BLD AUTO: 8.21 K/UL (ref 3.9–12.7)

## 2020-07-23 PROCEDURE — 25000003 PHARM REV CODE 250: Performed by: STUDENT IN AN ORGANIZED HEALTH CARE EDUCATION/TRAINING PROGRAM

## 2020-07-23 PROCEDURE — 25000003 PHARM REV CODE 250: Performed by: COLON & RECTAL SURGERY

## 2020-07-23 PROCEDURE — 63600175 PHARM REV CODE 636 W HCPCS: Performed by: NURSE PRACTITIONER

## 2020-07-23 PROCEDURE — A4217 STERILE WATER/SALINE, 500 ML: HCPCS | Performed by: NURSE PRACTITIONER

## 2020-07-23 PROCEDURE — 63600175 PHARM REV CODE 636 W HCPCS: Performed by: STUDENT IN AN ORGANIZED HEALTH CARE EDUCATION/TRAINING PROGRAM

## 2020-07-23 PROCEDURE — 84100 ASSAY OF PHOSPHORUS: CPT

## 2020-07-23 PROCEDURE — 83735 ASSAY OF MAGNESIUM: CPT

## 2020-07-23 PROCEDURE — 85025 COMPLETE CBC W/AUTO DIFF WBC: CPT

## 2020-07-23 PROCEDURE — A4216 STERILE WATER/SALINE, 10 ML: HCPCS | Performed by: COLON & RECTAL SURGERY

## 2020-07-23 PROCEDURE — 80053 COMPREHEN METABOLIC PANEL: CPT

## 2020-07-23 PROCEDURE — 25000003 PHARM REV CODE 250: Performed by: NURSE PRACTITIONER

## 2020-07-23 PROCEDURE — 20600001 HC STEP DOWN PRIVATE ROOM

## 2020-07-23 RX ADMIN — HYDROMORPHONE HYDROCHLORIDE 0.5 MG: 1 INJECTION, SOLUTION INTRAMUSCULAR; INTRAVENOUS; SUBCUTANEOUS at 03:07

## 2020-07-23 RX ADMIN — HYDROMORPHONE HYDROCHLORIDE 0.8 MG: 1 INJECTION, SOLUTION INTRAMUSCULAR; INTRAVENOUS; SUBCUTANEOUS at 04:07

## 2020-07-23 RX ADMIN — Medication 10 ML: at 07:07

## 2020-07-23 RX ADMIN — HYDROMORPHONE HYDROCHLORIDE 0.5 MG: 1 INJECTION, SOLUTION INTRAMUSCULAR; INTRAVENOUS; SUBCUTANEOUS at 06:07

## 2020-07-23 RX ADMIN — Medication 10 ML: at 12:07

## 2020-07-23 RX ADMIN — PROMETHAZINE HYDROCHLORIDE 6.25 MG: 25 INJECTION INTRAMUSCULAR; INTRAVENOUS at 11:07

## 2020-07-23 RX ADMIN — CARVEDILOL 25 MG: 25 TABLET, FILM COATED ORAL at 09:07

## 2020-07-23 RX ADMIN — PROMETHAZINE HYDROCHLORIDE 6.25 MG: 25 INJECTION INTRAMUSCULAR; INTRAVENOUS at 03:07

## 2020-07-23 RX ADMIN — ONDANSETRON 8 MG: 2 INJECTION INTRAMUSCULAR; INTRAVENOUS at 12:07

## 2020-07-23 RX ADMIN — HYDROMORPHONE HYDROCHLORIDE 0.5 MG: 1 INJECTION, SOLUTION INTRAMUSCULAR; INTRAVENOUS; SUBCUTANEOUS at 10:07

## 2020-07-23 RX ADMIN — PROMETHAZINE HYDROCHLORIDE 6.25 MG: 25 INJECTION INTRAMUSCULAR; INTRAVENOUS at 05:07

## 2020-07-23 RX ADMIN — ONDANSETRON 8 MG: 2 INJECTION INTRAMUSCULAR; INTRAVENOUS at 10:07

## 2020-07-23 RX ADMIN — HYDROMORPHONE HYDROCHLORIDE 0.5 MG: 1 INJECTION, SOLUTION INTRAMUSCULAR; INTRAVENOUS; SUBCUTANEOUS at 07:07

## 2020-07-23 RX ADMIN — MAGNESIUM SULFATE HEPTAHYDRATE: 500 INJECTION, SOLUTION INTRAMUSCULAR; INTRAVENOUS at 11:07

## 2020-07-23 RX ADMIN — HYDROMORPHONE HYDROCHLORIDE 0.5 MG: 1 INJECTION, SOLUTION INTRAMUSCULAR; INTRAVENOUS; SUBCUTANEOUS at 01:07

## 2020-07-23 RX ADMIN — ENOXAPARIN SODIUM 40 MG: 100 INJECTION SUBCUTANEOUS at 04:07

## 2020-07-23 RX ADMIN — Medication 10 ML: at 05:07

## 2020-07-23 RX ADMIN — HYDROMORPHONE HYDROCHLORIDE 0.8 MG: 1 INJECTION, SOLUTION INTRAMUSCULAR; INTRAVENOUS; SUBCUTANEOUS at 09:07

## 2020-07-23 NOTE — PROGRESS NOTES
[facility]  Colorectal Surgery  Progress Note    Patient Name: Azucena Morrison  MRN: [id number]  Admission Date: 7/15/2020  Hospital Length of Stay: 8 days  Attending Physician: Fabiana Valiente MD    Subjective:     Interval History: no acute events overnite, NGT removed, may have sips, adequate pain control  Post-Op Info:  Procedure(s) (LRB):  RESECTION, COLON, LOW ANTERIOR, possible loop ileostomy, ERAS high (N/A)          Medications:  Continuous Infusions:   TPN ADULT CENTRAL LINE CUSTOM 75 mL/hr at 07/22/20 2212    TPN ADULT CENTRAL LINE CUSTOM       Scheduled Meds:   carvediloL  25 mg Oral Daily    enoxaparin  40 mg Subcutaneous Q24H    sodium chloride 0.9%  10 mL Intravenous Q6H     PRN Meds:   benzocaine    dextrose 50%    dextrose 50%    glucagon (human recombinant)    glucagon (human recombinant)    HYDROmorphone    HYDROmorphone    insulin aspart U-100    insulin aspart U-100    ondansetron    promethazine (PHENERGAN) IVPB    sodium chloride 0.9%        Objective:     Vital Signs (Most Recent):  Temp: 97.9 °F (36.6 °C) (07/23/20 0806)  Pulse: 105 (07/23/20 0806)  Resp: 16 (07/23/20 1038)  BP: 125/76 (07/23/20 0806)  SpO2: 97 % (07/23/20 0806) Vital Signs (24h Range):  Temp:  [97.2 °F (36.2 °C)-98.1 °F (36.7 °C)] 97.9 °F (36.6 °C)  Pulse:  [] 105  Resp:  [10-19] 16  SpO2:  [94 %-99 %] 97 %  BP: (110-125)/(65-88) 125/76     Intake/Output - Last 3 Shifts       07/21 0700 - 07/22 0659 07/22 0700 - 07/23 0659 07/23 0700 - 07/24 0659    P.O. 0 0     NG/GT 60 120     Total Intake(mL/kg) 60 (0.8) 120 (1.7)     Urine (mL/kg/hr) 400 (0.2) 0 (0)     Emesis/NG output 0 0     Drains 200 200     Other  0     Stool 0 0     Blood 0      Total Output 600 200     Net -540 -80            Urine Occurrence  3 x     Stool Occurrence  0 x     Emesis Occurrence  0 x           Physical Exam  Constitutional:       Appearance: She is well-developed. She is not ill-appearing.   HENT:       Head: Normocephalic.   Eyes:      Pupils: Pupils are equal, round, and reactive to light.   Cardiovascular:      Rate and Rhythm: Normal rate and regular rhythm.      Heart sounds: Normal heart sounds.   Pulmonary:      Effort: Pulmonary effort is normal. No respiratory distress.      Breath sounds: Normal breath sounds. No wheezing or rales.   Abdominal:      Palpations: Abdomen is soft. There is no mass.      Tenderness: There is no guarding or rebound.   Skin:     General: Skin is warm and dry.   Neurological:      Mental Status: She is alert and oriented to person, place, and time.   Psychiatric:         Behavior: Behavior normal.         Thought Content: Thought content normal.         Judgment: Judgment normal.           Significant Labs:  BMP (Last 3 Results):   Recent Labs   Lab 07/21/20  0916 07/22/20  0330 07/23/20  0432   *  172* 152*  152* 164*  164*   *  132* 135*  135* 136  136   K 4.3  4.3 3.8  3.8 4.3  4.3   CL 97  97 95  95 96  96   CO2 24  24 29  29 32*  32*   BUN 20  20 26*  26* 37*  37*   CREATININE 0.9  0.9 1.1  1.1 1.2  1.2   CALCIUM 9.2  9.2 9.3  9.3 9.5  9.5   MG 2.0  2.0 2.3  2.3 2.2  2.2     CBC (Last 3 Results):   Recent Labs   Lab 07/21/20 0916 07/22/20  0330 07/23/20  0432   WBC 8.67 8.58 8.21   RBC 4.22 4.25 4.35   HGB 10.1* 9.8* 9.9*   HCT 31.7* 32.5* 33.1*   * 372* 374*   MCV 75* 77* 76*   MCH 23.9* 23.1* 22.8*   MCHC 31.9* 30.2* 29.9*       Significant Diagnostics:  None    Assessment/Plan:     * Cutaneous fistula  Surgical intervention potentially planned for later in month  Continue TPN  NGT removed 7/23  Pain control  IVF - replace losses    Chronic kidney disease, stage 3  Monitor labs  Monitor I&O    Generalized abdominal pain  Continue IV pain meds  Begin to wean pain meds    Essential hypertension  Monitor BP  Home meds    Type 2 diabetes mellitus without complication, with long-term current use of insulin  SSI  Accu check  Q6          Eli Paris NP  Colorectal Surgery  [facility]

## 2020-07-23 NOTE — ASSESSMENT & PLAN NOTE
Surgical intervention potentially planned for later in month  Continue TPN  NGT removed 7/23  Pain control  IVF - replace losses

## 2020-07-23 NOTE — SUBJECTIVE & OBJECTIVE
Subjective:     Interval History: no acute events overnite, NGT removed, may have sips, adequate pain control  Post-Op Info:  Procedure(s) (LRB):  RESECTION, COLON, LOW ANTERIOR, possible loop ileostomy, ERAS high (N/A)          Medications:  Continuous Infusions:   TPN ADULT CENTRAL LINE CUSTOM 75 mL/hr at 07/22/20 2212    TPN ADULT CENTRAL LINE CUSTOM       Scheduled Meds:   carvediloL  25 mg Oral Daily    enoxaparin  40 mg Subcutaneous Q24H    sodium chloride 0.9%  10 mL Intravenous Q6H     PRN Meds:   benzocaine    dextrose 50%    dextrose 50%    glucagon (human recombinant)    glucagon (human recombinant)    HYDROmorphone    HYDROmorphone    insulin aspart U-100    insulin aspart U-100    ondansetron    promethazine (PHENERGAN) IVPB    sodium chloride 0.9%        Objective:     Vital Signs (Most Recent):  Temp: 97.9 °F (36.6 °C) (07/23/20 0806)  Pulse: 105 (07/23/20 0806)  Resp: 16 (07/23/20 1038)  BP: 125/76 (07/23/20 0806)  SpO2: 97 % (07/23/20 0806) Vital Signs (24h Range):  Temp:  [97.2 °F (36.2 °C)-98.1 °F (36.7 °C)] 97.9 °F (36.6 °C)  Pulse:  [] 105  Resp:  [10-19] 16  SpO2:  [94 %-99 %] 97 %  BP: (110-125)/(65-88) 125/76     Intake/Output - Last 3 Shifts       07/21 0700 - 07/22 0659 07/22 0700 - 07/23 0659 07/23 0700 - 07/24 0659    P.O. 0 0     NG/GT 60 120     Total Intake(mL/kg) 60 (0.8) 120 (1.7)     Urine (mL/kg/hr) 400 (0.2) 0 (0)     Emesis/NG output 0 0     Drains 200 200     Other  0     Stool 0 0     Blood 0      Total Output 600 200     Net -540 -80            Urine Occurrence  3 x     Stool Occurrence  0 x     Emesis Occurrence  0 x           Physical Exam  Constitutional:       Appearance: She is well-developed. She is not ill-appearing.   HENT:      Head: Normocephalic.   Eyes:      Pupils: Pupils are equal, round, and reactive to light.   Cardiovascular:      Rate and Rhythm: Normal rate and regular rhythm.      Heart sounds: Normal heart sounds.   Pulmonary:       Effort: Pulmonary effort is normal. No respiratory distress.      Breath sounds: Normal breath sounds. No wheezing or rales.   Abdominal:      Palpations: Abdomen is soft. There is no mass.      Tenderness: There is no guarding or rebound.   Skin:     General: Skin is warm and dry.   Neurological:      Mental Status: She is alert and oriented to person, place, and time.   Psychiatric:         Behavior: Behavior normal.         Thought Content: Thought content normal.         Judgment: Judgment normal.           Significant Labs:  BMP (Last 3 Results):   Recent Labs   Lab 07/21/20  0916 07/22/20  0330 07/23/20  0432   *  172* 152*  152* 164*  164*   *  132* 135*  135* 136  136   K 4.3  4.3 3.8  3.8 4.3  4.3   CL 97  97 95  95 96  96   CO2 24  24 29  29 32*  32*   BUN 20  20 26*  26* 37*  37*   CREATININE 0.9  0.9 1.1  1.1 1.2  1.2   CALCIUM 9.2  9.2 9.3  9.3 9.5  9.5   MG 2.0  2.0 2.3  2.3 2.2  2.2     CBC (Last 3 Results):   Recent Labs   Lab 07/21/20  0916 07/22/20  0330 07/23/20  0432   WBC 8.67 8.58 8.21   RBC 4.22 4.25 4.35   HGB 10.1* 9.8* 9.9*   HCT 31.7* 32.5* 33.1*   * 372* 374*   MCV 75* 77* 76*   MCH 23.9* 23.1* 22.8*   MCHC 31.9* 30.2* 29.9*       Significant Diagnostics:  None

## 2020-07-24 LAB
ALBUMIN SERPL BCP-MCNC: 2.9 G/DL (ref 3.5–5.2)
ALP SERPL-CCNC: 103 U/L (ref 55–135)
ALT SERPL W/O P-5'-P-CCNC: 25 U/L (ref 10–44)
ANION GAP SERPL CALC-SCNC: 8 MMOL/L (ref 8–16)
ANION GAP SERPL CALC-SCNC: 8 MMOL/L (ref 8–16)
AST SERPL-CCNC: 29 U/L (ref 10–40)
BASOPHILS # BLD AUTO: 0.05 K/UL (ref 0–0.2)
BASOPHILS NFR BLD: 0.6 % (ref 0–1.9)
BILIRUB SERPL-MCNC: 0.3 MG/DL (ref 0.1–1)
BUN SERPL-MCNC: 40 MG/DL (ref 8–23)
BUN SERPL-MCNC: 40 MG/DL (ref 8–23)
CALCIUM SERPL-MCNC: 9.3 MG/DL (ref 8.7–10.5)
CALCIUM SERPL-MCNC: 9.3 MG/DL (ref 8.7–10.5)
CHLORIDE SERPL-SCNC: 98 MMOL/L (ref 95–110)
CHLORIDE SERPL-SCNC: 98 MMOL/L (ref 95–110)
CO2 SERPL-SCNC: 28 MMOL/L (ref 23–29)
CO2 SERPL-SCNC: 28 MMOL/L (ref 23–29)
CREAT SERPL-MCNC: 1.3 MG/DL (ref 0.5–1.4)
CREAT SERPL-MCNC: 1.3 MG/DL (ref 0.5–1.4)
DIFFERENTIAL METHOD: ABNORMAL
EOSINOPHIL # BLD AUTO: 0.4 K/UL (ref 0–0.5)
EOSINOPHIL NFR BLD: 5.2 % (ref 0–8)
ERYTHROCYTE [DISTWIDTH] IN BLOOD BY AUTOMATED COUNT: 16.4 % (ref 11.5–14.5)
EST. GFR  (AFRICAN AMERICAN): 48.4 ML/MIN/1.73 M^2
EST. GFR  (AFRICAN AMERICAN): 48.4 ML/MIN/1.73 M^2
EST. GFR  (NON AFRICAN AMERICAN): 42 ML/MIN/1.73 M^2
EST. GFR  (NON AFRICAN AMERICAN): 42 ML/MIN/1.73 M^2
GLUCOSE SERPL-MCNC: 139 MG/DL (ref 70–110)
GLUCOSE SERPL-MCNC: 139 MG/DL (ref 70–110)
HCT VFR BLD AUTO: 32.3 % (ref 37–48.5)
HGB BLD-MCNC: 9.8 G/DL (ref 12–16)
IMM GRANULOCYTES # BLD AUTO: 0.03 K/UL (ref 0–0.04)
IMM GRANULOCYTES NFR BLD AUTO: 0.4 % (ref 0–0.5)
LYMPHOCYTES # BLD AUTO: 2.8 K/UL (ref 1–4.8)
LYMPHOCYTES NFR BLD: 33.9 % (ref 18–48)
MAGNESIUM SERPL-MCNC: 2.2 MG/DL (ref 1.6–2.6)
MAGNESIUM SERPL-MCNC: 2.2 MG/DL (ref 1.6–2.6)
MCH RBC QN AUTO: 23.4 PG (ref 27–31)
MCHC RBC AUTO-ENTMCNC: 30.3 G/DL (ref 32–36)
MCV RBC AUTO: 77 FL (ref 82–98)
MONOCYTES # BLD AUTO: 0.8 K/UL (ref 0.3–1)
MONOCYTES NFR BLD: 9 % (ref 4–15)
NEUTROPHILS # BLD AUTO: 4.2 K/UL (ref 1.8–7.7)
NEUTROPHILS NFR BLD: 50.9 % (ref 38–73)
NRBC BLD-RTO: 0 /100 WBC
PHOSPHATE SERPL-MCNC: 4 MG/DL (ref 2.7–4.5)
PHOSPHATE SERPL-MCNC: 4 MG/DL (ref 2.7–4.5)
PLATELET # BLD AUTO: 359 K/UL (ref 150–350)
PMV BLD AUTO: 8.7 FL (ref 9.2–12.9)
POCT GLUCOSE: 170 MG/DL (ref 70–110)
POCT GLUCOSE: 172 MG/DL (ref 70–110)
POTASSIUM SERPL-SCNC: 4.6 MMOL/L (ref 3.5–5.1)
POTASSIUM SERPL-SCNC: 4.6 MMOL/L (ref 3.5–5.1)
PROT SERPL-MCNC: 8 G/DL (ref 6–8.4)
RBC # BLD AUTO: 4.19 M/UL (ref 4–5.4)
SODIUM SERPL-SCNC: 134 MMOL/L (ref 136–145)
SODIUM SERPL-SCNC: 134 MMOL/L (ref 136–145)
TRIGL SERPL-MCNC: 395 MG/DL (ref 30–150)
WBC # BLD AUTO: 8.29 K/UL (ref 3.9–12.7)

## 2020-07-24 PROCEDURE — 85025 COMPLETE CBC W/AUTO DIFF WBC: CPT

## 2020-07-24 PROCEDURE — 20600001 HC STEP DOWN PRIVATE ROOM

## 2020-07-24 PROCEDURE — A4217 STERILE WATER/SALINE, 500 ML: HCPCS | Performed by: NURSE PRACTITIONER

## 2020-07-24 PROCEDURE — 84100 ASSAY OF PHOSPHORUS: CPT

## 2020-07-24 PROCEDURE — 25000003 PHARM REV CODE 250: Performed by: COLON & RECTAL SURGERY

## 2020-07-24 PROCEDURE — A4216 STERILE WATER/SALINE, 10 ML: HCPCS | Performed by: COLON & RECTAL SURGERY

## 2020-07-24 PROCEDURE — 25000003 PHARM REV CODE 250: Performed by: STUDENT IN AN ORGANIZED HEALTH CARE EDUCATION/TRAINING PROGRAM

## 2020-07-24 PROCEDURE — 80053 COMPREHEN METABOLIC PANEL: CPT

## 2020-07-24 PROCEDURE — 63600175 PHARM REV CODE 636 W HCPCS: Performed by: NURSE PRACTITIONER

## 2020-07-24 PROCEDURE — 63600175 PHARM REV CODE 636 W HCPCS: Performed by: STUDENT IN AN ORGANIZED HEALTH CARE EDUCATION/TRAINING PROGRAM

## 2020-07-24 PROCEDURE — 83735 ASSAY OF MAGNESIUM: CPT

## 2020-07-24 PROCEDURE — 84478 ASSAY OF TRIGLYCERIDES: CPT

## 2020-07-24 PROCEDURE — 25000003 PHARM REV CODE 250: Performed by: NURSE PRACTITIONER

## 2020-07-24 RX ADMIN — HYDROMORPHONE HYDROCHLORIDE 0.8 MG: 1 INJECTION, SOLUTION INTRAMUSCULAR; INTRAVENOUS; SUBCUTANEOUS at 04:07

## 2020-07-24 RX ADMIN — PROMETHAZINE HYDROCHLORIDE 6.25 MG: 25 INJECTION INTRAMUSCULAR; INTRAVENOUS at 10:07

## 2020-07-24 RX ADMIN — HYDROMORPHONE HYDROCHLORIDE 0.8 MG: 1 INJECTION, SOLUTION INTRAMUSCULAR; INTRAVENOUS; SUBCUTANEOUS at 10:07

## 2020-07-24 RX ADMIN — ONDANSETRON 8 MG: 2 INJECTION INTRAMUSCULAR; INTRAVENOUS at 04:07

## 2020-07-24 RX ADMIN — HYDROMORPHONE HYDROCHLORIDE 0.8 MG: 1 INJECTION, SOLUTION INTRAMUSCULAR; INTRAVENOUS; SUBCUTANEOUS at 07:07

## 2020-07-24 RX ADMIN — Medication 10 ML: at 12:07

## 2020-07-24 RX ADMIN — ENOXAPARIN SODIUM 40 MG: 100 INJECTION SUBCUTANEOUS at 05:07

## 2020-07-24 RX ADMIN — HYDROMORPHONE HYDROCHLORIDE 0.8 MG: 1 INJECTION, SOLUTION INTRAMUSCULAR; INTRAVENOUS; SUBCUTANEOUS at 02:07

## 2020-07-24 RX ADMIN — CARVEDILOL 25 MG: 25 TABLET, FILM COATED ORAL at 09:07

## 2020-07-24 RX ADMIN — Medication 10 ML: at 06:07

## 2020-07-24 RX ADMIN — HYDROMORPHONE HYDROCHLORIDE 0.8 MG: 1 INJECTION, SOLUTION INTRAMUSCULAR; INTRAVENOUS; SUBCUTANEOUS at 05:07

## 2020-07-24 RX ADMIN — HYDROMORPHONE HYDROCHLORIDE 0.8 MG: 1 INJECTION, SOLUTION INTRAMUSCULAR; INTRAVENOUS; SUBCUTANEOUS at 08:07

## 2020-07-24 RX ADMIN — MAGNESIUM SULFATE HEPTAHYDRATE: 500 INJECTION, SOLUTION INTRAMUSCULAR; INTRAVENOUS at 10:07

## 2020-07-24 RX ADMIN — HYDROMORPHONE HYDROCHLORIDE 0.8 MG: 1 INJECTION, SOLUTION INTRAMUSCULAR; INTRAVENOUS; SUBCUTANEOUS at 12:07

## 2020-07-24 RX ADMIN — ONDANSETRON 8 MG: 2 INJECTION INTRAMUSCULAR; INTRAVENOUS at 07:07

## 2020-07-24 RX ADMIN — PROMETHAZINE HYDROCHLORIDE 6.25 MG: 25 INJECTION INTRAMUSCULAR; INTRAVENOUS at 09:07

## 2020-07-24 NOTE — PROGRESS NOTES
[facility]  Colorectal Surgery  Progress Note    Patient Name: Azucena Morrison  MRN: [id number]  Admission Date: 7/15/2020  Hospital Length of Stay: 9 days  Attending Physician: Fabiana Valiente MD    Subjective:     Interval History: advanced to clears this morning, hasn't attempted yet  Post-Op Info:  Procedure(s) (LRB):  RESECTION, COLON, LOW ANTERIOR, possible loop ileostomy, ERAS high (N/A)          Medications:  Continuous Infusions:   TPN ADULT CENTRAL LINE CUSTOM 75 mL/hr at 07/23/20 2303     Scheduled Meds:   carvediloL  25 mg Oral Daily    enoxaparin  40 mg Subcutaneous Q24H    sodium chloride 0.9%  10 mL Intravenous Q6H     PRN Meds:   benzocaine    dextrose 50%    dextrose 50%    glucagon (human recombinant)    glucagon (human recombinant)    HYDROmorphone    HYDROmorphone    insulin aspart U-100    insulin aspart U-100    ondansetron    promethazine (PHENERGAN) IVPB    sodium chloride 0.9%        Objective:     Vital Signs (Most Recent):  Temp: 98.1 °F (36.7 °C) (07/24/20 0805)  Pulse: 83 (07/24/20 0805)  Resp: 16 (07/24/20 0805)  BP: (!) 114/57 (07/24/20 0805)  SpO2: 96 % (07/24/20 0805) Vital Signs (24h Range):  Temp:  [97.6 °F (36.4 °C)-98.1 °F (36.7 °C)] 98.1 °F (36.7 °C)  Pulse:  [78-91] 83  Resp:  [12-18] 16  SpO2:  [96 %-97 %] 96 %  BP: (100-116)/(55-74) 114/57     Intake/Output - Last 3 Shifts       07/22 0700 - 07/23 0659 07/23 0700 - 07/24 0659 07/24 0700 - 07/25 0659    P.O. 0      NG/      IV Piggyback  200     TPN  146.3     Total Intake(mL/kg) 120 (1.7) 346.3 (4.9)     Urine (mL/kg/hr) 0 (0)      Emesis/NG output 0      Drains 200      Other 0      Stool 0      Blood       Total Output 200      Net -80 +346.3            Urine Occurrence 3 x 2 x     Stool Occurrence 0 x      Emesis Occurrence 0 x            Physical Exam  Constitutional:       Appearance: She is well-developed. She is not ill-appearing.   HENT:      Head: Normocephalic.   Eyes:       Pupils: Pupils are equal, round, and reactive to light.   Cardiovascular:      Rate and Rhythm: Normal rate and regular rhythm.      Heart sounds: Normal heart sounds.   Pulmonary:      Effort: Pulmonary effort is normal. No respiratory distress.      Breath sounds: Normal breath sounds. No wheezing or rales.   Abdominal:      Palpations: Abdomen is soft. There is no mass.      Tenderness: There is no guarding or rebound.   Skin:     General: Skin is warm and dry.   Neurological:      Mental Status: She is alert and oriented to person, place, and time.   Psychiatric:         Behavior: Behavior normal.         Thought Content: Thought content normal.         Judgment: Judgment normal.           Significant Labs:  BMP (Last 3 Results):   Recent Labs   Lab 07/22/20  0330 07/23/20  0432 07/24/20  0350   *  152* 164*  164* 139*  139*   *  135* 136  136 134*  134*   K 3.8  3.8 4.3  4.3 4.6  4.6   CL 95  95 96  96 98  98   CO2 29  29 32*  32* 28  28   BUN 26*  26* 37*  37* 40*  40*   CREATININE 1.1  1.1 1.2  1.2 1.3  1.3   CALCIUM 9.3  9.3 9.5  9.5 9.3  9.3   MG 2.3  2.3 2.2  2.2 2.2  2.2     CBC (Last 3 Results):   Recent Labs   Lab 07/22/20  0330 07/23/20  0432 07/24/20  0350   WBC 8.58 8.21 8.29   RBC 4.25 4.35 4.19   HGB 9.8* 9.9* 9.8*   HCT 32.5* 33.1* 32.3*   * 374* 359*   MCV 77* 76* 77*   MCH 23.1* 22.8* 23.4*   MCHC 30.2* 29.9* 30.3*       Significant Diagnostics:  None    Assessment/Plan:     * Cutaneous fistula  Surgical intervention potentially planned for next friday  Continue TPN  NGT removed 7/23 > advanced to clears with boost  Pain control  IVF - replace losses    Chronic kidney disease, stage 3  Monitor labs  Monitor I&O    Generalized abdominal pain  Continue IV pain meds  Begin to wean pain meds    Essential hypertension  Monitor BP  Home meds    Type 2 diabetes mellitus without complication, with long-term current use of insulin  SSI  Accu check  Q6          Suzette Vargas NP  Colorectal Surgery  [facility]

## 2020-07-24 NOTE — PLAN OF CARE
POC reviewed with patient, states understanding. AOx4. VS WDL. NPO. Nausea and pain managed per MAR. X2 Fistulas to ABD covered with gauze, patient independent with dressing changes. Voids per bathroom, ambulates independently. TPN infusing per order. Will continue to manage POC.

## 2020-07-24 NOTE — PLAN OF CARE
Patient is not medically ready for discharge. Plan is to OR on Friday.      07/24/20 1409   Discharge Reassessment   Assessment Type Discharge Planning Reassessment   Discharge Plan A Home   Discharge Plan B Other  (TBD)   DME Needed Upon Discharge  none   Anticipated Discharge Disposition Home   Can the patient/caregiver answer the patient profile reliably? Yes, cognitively intact     Rosy Lynch RN, CM   Ext: [phone]

## 2020-07-24 NOTE — SUBJECTIVE & OBJECTIVE
Subjective:     Interval History: advanced to clears this morning, hasn't attempted yet  Post-Op Info:  Procedure(s) (LRB):  RESECTION, COLON, LOW ANTERIOR, possible loop ileostomy, ERAS high (N/A)          Medications:  Continuous Infusions:   TPN ADULT CENTRAL LINE CUSTOM 75 mL/hr at 07/23/20 2303     Scheduled Meds:   carvediloL  25 mg Oral Daily    enoxaparin  40 mg Subcutaneous Q24H    sodium chloride 0.9%  10 mL Intravenous Q6H     PRN Meds:   benzocaine    dextrose 50%    dextrose 50%    glucagon (human recombinant)    glucagon (human recombinant)    HYDROmorphone    HYDROmorphone    insulin aspart U-100    insulin aspart U-100    ondansetron    promethazine (PHENERGAN) IVPB    sodium chloride 0.9%        Objective:     Vital Signs (Most Recent):  Temp: 98.1 °F (36.7 °C) (07/24/20 0805)  Pulse: 83 (07/24/20 0805)  Resp: 16 (07/24/20 0805)  BP: (!) 114/57 (07/24/20 0805)  SpO2: 96 % (07/24/20 0805) Vital Signs (24h Range):  Temp:  [97.6 °F (36.4 °C)-98.1 °F (36.7 °C)] 98.1 °F (36.7 °C)  Pulse:  [78-91] 83  Resp:  [12-18] 16  SpO2:  [96 %-97 %] 96 %  BP: (100-116)/(55-74) 114/57     Intake/Output - Last 3 Shifts       07/22 0700 - 07/23 0659 07/23 0700 - 07/24 0659 07/24 0700 - 07/25 0659    P.O. 0      NG/      IV Piggyback  200     TPN  146.3     Total Intake(mL/kg) 120 (1.7) 346.3 (4.9)     Urine (mL/kg/hr) 0 (0)      Emesis/NG output 0      Drains 200      Other 0      Stool 0      Blood       Total Output 200      Net -80 +346.3            Urine Occurrence 3 x 2 x     Stool Occurrence 0 x      Emesis Occurrence 0 x            Physical Exam  Constitutional:       Appearance: She is well-developed. She is not ill-appearing.   HENT:      Head: Normocephalic.   Eyes:      Pupils: Pupils are equal, round, and reactive to light.   Cardiovascular:      Rate and Rhythm: Normal rate and regular rhythm.      Heart sounds: Normal heart sounds.   Pulmonary:      Effort: Pulmonary effort is  normal. No respiratory distress.      Breath sounds: Normal breath sounds. No wheezing or rales.   Abdominal:      Palpations: Abdomen is soft. There is no mass.      Tenderness: There is no guarding or rebound.   Skin:     General: Skin is warm and dry.   Neurological:      Mental Status: She is alert and oriented to person, place, and time.   Psychiatric:         Behavior: Behavior normal.         Thought Content: Thought content normal.         Judgment: Judgment normal.           Significant Labs:  BMP (Last 3 Results):   Recent Labs   Lab 07/22/20  0330 07/23/20  0432 07/24/20  0350   *  152* 164*  164* 139*  139*   *  135* 136  136 134*  134*   K 3.8  3.8 4.3  4.3 4.6  4.6   CL 95  95 96  96 98  98   CO2 29  29 32*  32* 28  28   BUN 26*  26* 37*  37* 40*  40*   CREATININE 1.1  1.1 1.2  1.2 1.3  1.3   CALCIUM 9.3  9.3 9.5  9.5 9.3  9.3   MG 2.3  2.3 2.2  2.2 2.2  2.2     CBC (Last 3 Results):   Recent Labs   Lab 07/22/20  0330 07/23/20  0432 07/24/20  0350   WBC 8.58 8.21 8.29   RBC 4.25 4.35 4.19   HGB 9.8* 9.9* 9.8*   HCT 32.5* 33.1* 32.3*   * 374* 359*   MCV 77* 76* 77*   MCH 23.1* 22.8* 23.4*   MCHC 30.2* 29.9* 30.3*       Significant Diagnostics:  None

## 2020-07-24 NOTE — ASSESSMENT & PLAN NOTE
Surgical intervention potentially planned for next friday  Continue TPN  NGT removed 7/23 > advanced to clears with boost  Pain control  IVF - replace losses

## 2020-07-25 LAB
ALBUMIN SERPL BCP-MCNC: 2.6 G/DL (ref 3.5–5.2)
ALP SERPL-CCNC: 101 U/L (ref 55–135)
ALT SERPL W/O P-5'-P-CCNC: 23 U/L (ref 10–44)
ANION GAP SERPL CALC-SCNC: 9 MMOL/L (ref 8–16)
ANION GAP SERPL CALC-SCNC: 9 MMOL/L (ref 8–16)
AST SERPL-CCNC: 23 U/L (ref 10–40)
BASOPHILS # BLD AUTO: 0.03 K/UL (ref 0–0.2)
BASOPHILS NFR BLD: 0.4 % (ref 0–1.9)
BILIRUB SERPL-MCNC: 0.3 MG/DL (ref 0.1–1)
BUN SERPL-MCNC: 33 MG/DL (ref 8–23)
BUN SERPL-MCNC: 33 MG/DL (ref 8–23)
CALCIUM SERPL-MCNC: 8.9 MG/DL (ref 8.7–10.5)
CALCIUM SERPL-MCNC: 8.9 MG/DL (ref 8.7–10.5)
CHLORIDE SERPL-SCNC: 103 MMOL/L (ref 95–110)
CHLORIDE SERPL-SCNC: 103 MMOL/L (ref 95–110)
CO2 SERPL-SCNC: 22 MMOL/L (ref 23–29)
CO2 SERPL-SCNC: 22 MMOL/L (ref 23–29)
CREAT SERPL-MCNC: 1.1 MG/DL (ref 0.5–1.4)
CREAT SERPL-MCNC: 1.1 MG/DL (ref 0.5–1.4)
DIFFERENTIAL METHOD: ABNORMAL
EOSINOPHIL # BLD AUTO: 0.4 K/UL (ref 0–0.5)
EOSINOPHIL NFR BLD: 4.4 % (ref 0–8)
ERYTHROCYTE [DISTWIDTH] IN BLOOD BY AUTOMATED COUNT: 16.3 % (ref 11.5–14.5)
EST. GFR  (AFRICAN AMERICAN): 59.2 ML/MIN/1.73 M^2
EST. GFR  (AFRICAN AMERICAN): 59.2 ML/MIN/1.73 M^2
EST. GFR  (NON AFRICAN AMERICAN): 51.3 ML/MIN/1.73 M^2
EST. GFR  (NON AFRICAN AMERICAN): 51.3 ML/MIN/1.73 M^2
GLUCOSE SERPL-MCNC: 160 MG/DL (ref 70–110)
GLUCOSE SERPL-MCNC: 160 MG/DL (ref 70–110)
HCT VFR BLD AUTO: 30.6 % (ref 37–48.5)
HGB BLD-MCNC: 9.2 G/DL (ref 12–16)
IMM GRANULOCYTES # BLD AUTO: 0.02 K/UL (ref 0–0.04)
IMM GRANULOCYTES NFR BLD AUTO: 0.2 % (ref 0–0.5)
LYMPHOCYTES # BLD AUTO: 2.4 K/UL (ref 1–4.8)
LYMPHOCYTES NFR BLD: 28.5 % (ref 18–48)
MAGNESIUM SERPL-MCNC: 2 MG/DL (ref 1.6–2.6)
MCH RBC QN AUTO: 23.1 PG (ref 27–31)
MCHC RBC AUTO-ENTMCNC: 30.1 G/DL (ref 32–36)
MCV RBC AUTO: 77 FL (ref 82–98)
MONOCYTES # BLD AUTO: 0.8 K/UL (ref 0.3–1)
MONOCYTES NFR BLD: 9.1 % (ref 4–15)
NEUTROPHILS # BLD AUTO: 4.7 K/UL (ref 1.8–7.7)
NEUTROPHILS NFR BLD: 57.4 % (ref 38–73)
NRBC BLD-RTO: 0 /100 WBC
PHOSPHATE SERPL-MCNC: 3.8 MG/DL (ref 2.7–4.5)
PHOSPHATE SERPL-MCNC: 3.8 MG/DL (ref 2.7–4.5)
PLATELET # BLD AUTO: 357 K/UL (ref 150–350)
PMV BLD AUTO: 8.8 FL (ref 9.2–12.9)
POCT GLUCOSE: 168 MG/DL (ref 70–110)
POCT GLUCOSE: 178 MG/DL (ref 70–110)
POCT GLUCOSE: 195 MG/DL (ref 70–110)
POTASSIUM SERPL-SCNC: 4.8 MMOL/L (ref 3.5–5.1)
POTASSIUM SERPL-SCNC: 4.8 MMOL/L (ref 3.5–5.1)
PROT SERPL-MCNC: 7.5 G/DL (ref 6–8.4)
RBC # BLD AUTO: 3.98 M/UL (ref 4–5.4)
SODIUM SERPL-SCNC: 134 MMOL/L (ref 136–145)
SODIUM SERPL-SCNC: 134 MMOL/L (ref 136–145)
WBC # BLD AUTO: 8.24 K/UL (ref 3.9–12.7)

## 2020-07-25 PROCEDURE — 63600175 PHARM REV CODE 636 W HCPCS: Performed by: SURGERY

## 2020-07-25 PROCEDURE — A4217 STERILE WATER/SALINE, 500 ML: HCPCS | Performed by: SURGERY

## 2020-07-25 PROCEDURE — 25000003 PHARM REV CODE 250: Performed by: SURGERY

## 2020-07-25 PROCEDURE — 85025 COMPLETE CBC W/AUTO DIFF WBC: CPT

## 2020-07-25 PROCEDURE — 84100 ASSAY OF PHOSPHORUS: CPT

## 2020-07-25 PROCEDURE — 25000003 PHARM REV CODE 250: Performed by: STUDENT IN AN ORGANIZED HEALTH CARE EDUCATION/TRAINING PROGRAM

## 2020-07-25 PROCEDURE — 63600175 PHARM REV CODE 636 W HCPCS: Performed by: STUDENT IN AN ORGANIZED HEALTH CARE EDUCATION/TRAINING PROGRAM

## 2020-07-25 PROCEDURE — A4216 STERILE WATER/SALINE, 10 ML: HCPCS | Performed by: COLON & RECTAL SURGERY

## 2020-07-25 PROCEDURE — 80053 COMPREHEN METABOLIC PANEL: CPT

## 2020-07-25 PROCEDURE — 63600175 PHARM REV CODE 636 W HCPCS: Performed by: NURSE PRACTITIONER

## 2020-07-25 PROCEDURE — 20600001 HC STEP DOWN PRIVATE ROOM

## 2020-07-25 PROCEDURE — 83735 ASSAY OF MAGNESIUM: CPT

## 2020-07-25 PROCEDURE — 25000003 PHARM REV CODE 250: Performed by: COLON & RECTAL SURGERY

## 2020-07-25 RX ADMIN — Medication 10 ML: at 05:07

## 2020-07-25 RX ADMIN — HYDROMORPHONE HYDROCHLORIDE 0.8 MG: 1 INJECTION, SOLUTION INTRAMUSCULAR; INTRAVENOUS; SUBCUTANEOUS at 06:07

## 2020-07-25 RX ADMIN — HYDROMORPHONE HYDROCHLORIDE 0.8 MG: 1 INJECTION, SOLUTION INTRAMUSCULAR; INTRAVENOUS; SUBCUTANEOUS at 03:07

## 2020-07-25 RX ADMIN — ONDANSETRON 8 MG: 2 INJECTION INTRAMUSCULAR; INTRAVENOUS at 09:07

## 2020-07-25 RX ADMIN — ENOXAPARIN SODIUM 40 MG: 100 INJECTION SUBCUTANEOUS at 05:07

## 2020-07-25 RX ADMIN — Medication 10 ML: at 12:07

## 2020-07-25 RX ADMIN — HYDROMORPHONE HYDROCHLORIDE 0.8 MG: 1 INJECTION, SOLUTION INTRAMUSCULAR; INTRAVENOUS; SUBCUTANEOUS at 09:07

## 2020-07-25 RX ADMIN — ONDANSETRON 8 MG: 2 INJECTION INTRAMUSCULAR; INTRAVENOUS at 03:07

## 2020-07-25 RX ADMIN — MAGNESIUM SULFATE HEPTAHYDRATE: 500 INJECTION, SOLUTION INTRAMUSCULAR; INTRAVENOUS at 11:07

## 2020-07-25 RX ADMIN — HYDROMORPHONE HYDROCHLORIDE 0.8 MG: 1 INJECTION, SOLUTION INTRAMUSCULAR; INTRAVENOUS; SUBCUTANEOUS at 12:07

## 2020-07-25 RX ADMIN — CARVEDILOL 25 MG: 25 TABLET, FILM COATED ORAL at 09:07

## 2020-07-25 NOTE — PLAN OF CARE
POC reviewed with patient, states understanding. AOx4. VS WDL. Reports constant nausea that is managed with PRN meds, but is tolerating sips of water/juice without emesis. Pain managed per MAR. X2 Fistulas to ABD covered with gauze, patient independent with dressing changes. Voids per bathroom, ambulates independently. TPN infusing per order. Will continue to manage POC.

## 2020-07-25 NOTE — ASSESSMENT & PLAN NOTE
Surgical intervention potentially planned for next friday  Continue TPN  Continue to clears with boost  Pain control  IVF - replace losses

## 2020-07-25 NOTE — PLAN OF CARE
POC reviewed with pt.  Pt A & O x 4, adequate urine output via commode. Vital signs stable on room air. Pain controlled with prn pain meds.

## 2020-07-25 NOTE — SUBJECTIVE & OBJECTIVE
Subjective:     Interval History: tolerating minimal clears with some nausea.     Post-Op Info:  Procedure(s) (LRB):  RESECTION, COLON, LOW ANTERIOR, possible loop ileostomy, ERAS high (N/A)          Medications:  Continuous Infusions:   TPN ADULT CENTRAL LINE CUSTOM 75 mL/hr at 07/24/20 2253    TPN ADULT CENTRAL LINE CUSTOM       Scheduled Meds:   carvediloL  25 mg Oral Daily    enoxaparin  40 mg Subcutaneous Q24H    sodium chloride 0.9%  10 mL Intravenous Q6H     PRN Meds:   benzocaine    dextrose 50%    dextrose 50%    glucagon (human recombinant)    glucagon (human recombinant)    HYDROmorphone    HYDROmorphone    insulin aspart U-100    insulin aspart U-100    ondansetron    promethazine (PHENERGAN) IVPB    sodium chloride 0.9%        Objective:     Vital Signs (Most Recent):  Temp: 98.5 °F (36.9 °C) (07/25/20 0745)  Pulse: 86 (07/25/20 0745)  Resp: 16 (07/25/20 0924)  BP: 112/70 (07/25/20 0745)  SpO2: 97 % (07/25/20 0745) Vital Signs (24h Range):  Temp:  [97.6 °F (36.4 °C)-98.8 °F (37.1 °C)] 98.5 °F (36.9 °C)  Pulse:  [82-88] 86  Resp:  [14-20] 16  SpO2:  [97 %-99 %] 97 %  BP: (102-133)/(64-75) 112/70     Intake/Output - Last 3 Shifts       07/23 0700 - 07/24 0659 07/24 0700 - 07/25 0659 07/25 0700 - 07/26 0659    P.O.       NG/GT       IV Piggyback 200 50     .3 1382.5     Total Intake(mL/kg) 346.3 (4.9) 1432.5 (20.3)     Urine (mL/kg/hr)  0 (0)     Emesis/NG output  0     Drains       Other  0     Stool  0     Blood  0     Total Output  0     Net +346.3 +1432.5            Urine Occurrence 2 x 7 x     Stool Occurrence  2 x 0 x    Emesis Occurrence  0 x           Physical Exam  Constitutional:       Appearance: She is well-developed. She is not ill-appearing.   HENT:      Head: Normocephalic.   Eyes:      Pupils: Pupils are equal, round, and reactive to light.   Cardiovascular:      Rate and Rhythm: Normal rate and regular rhythm.      Heart sounds: Normal heart sounds.   Pulmonary:       Effort: Pulmonary effort is normal. No respiratory distress.      Breath sounds: Normal breath sounds. No wheezing or rales.   Abdominal:      Palpations: Abdomen is soft. There is no mass.      Tenderness: There is no guarding or rebound.   Skin:     General: Skin is warm and dry.   Neurological:      Mental Status: She is alert and oriented to person, place, and time.   Psychiatric:         Behavior: Behavior normal.         Thought Content: Thought content normal.         Judgment: Judgment normal.       Significant Labs:  BMP (Last 3 Results):   Recent Labs   Lab 07/23/20  0432 07/24/20  0350 07/25/20  0305   *  164* 139*  139* 160*  160*     136 134*  134* 134*  134*   K 4.3  4.3 4.6  4.6 4.8  4.8   CL 96  96 98  98 103  103   CO2 32*  32* 28  28 22*  22*   BUN 37*  37* 40*  40* 33*  33*   CREATININE 1.2  1.2 1.3  1.3 1.1  1.1   CALCIUM 9.5  9.5 9.3  9.3 8.9  8.9   MG 2.2  2.2 2.2  2.2 2.0     CBC (Last 3 Results):   Recent Labs   Lab 07/23/20  0432 07/24/20  0350 07/25/20  0305   WBC 8.21 8.29 8.24   RBC 4.35 4.19 3.98*   HGB 9.9* 9.8* 9.2*   HCT 33.1* 32.3* 30.6*   * 359* 357*   MCV 76* 77* 77*   MCH 22.8* 23.4* 23.1*   MCHC 29.9* 30.3* 30.1*       Significant Diagnostics:  I have reviewed all pertinent imaging results/findings within the past 24 hours.

## 2020-07-25 NOTE — PROGRESS NOTES
[facility]  Colorectal Surgery  Progress Note    Patient Name: Azucena Morrison  MRN: [id number]  Admission Date: 7/15/2020  Hospital Length of Stay: 10 days  Attending Physician: Fabiana Valiente MD    Subjective:     Interval History: tolerating minimal clears with some nausea.     Post-Op Info:  Procedure(s) (LRB):  RESECTION, COLON, LOW ANTERIOR, possible loop ileostomy, ERAS high (N/A)          Medications:  Continuous Infusions:   TPN ADULT CENTRAL LINE CUSTOM 75 mL/hr at 07/24/20 2253    TPN ADULT CENTRAL LINE CUSTOM       Scheduled Meds:   carvediloL  25 mg Oral Daily    enoxaparin  40 mg Subcutaneous Q24H    sodium chloride 0.9%  10 mL Intravenous Q6H     PRN Meds:   benzocaine    dextrose 50%    dextrose 50%    glucagon (human recombinant)    glucagon (human recombinant)    HYDROmorphone    HYDROmorphone    insulin aspart U-100    insulin aspart U-100    ondansetron    promethazine (PHENERGAN) IVPB    sodium chloride 0.9%        Objective:     Vital Signs (Most Recent):  Temp: 98.5 °F (36.9 °C) (07/25/20 0745)  Pulse: 86 (07/25/20 0745)  Resp: 16 (07/25/20 0924)  BP: 112/70 (07/25/20 0745)  SpO2: 97 % (07/25/20 0745) Vital Signs (24h Range):  Temp:  [97.6 °F (36.4 °C)-98.8 °F (37.1 °C)] 98.5 °F (36.9 °C)  Pulse:  [82-88] 86  Resp:  [14-20] 16  SpO2:  [97 %-99 %] 97 %  BP: (102-133)/(64-75) 112/70     Intake/Output - Last 3 Shifts       07/23 0700 - 07/24 0659 07/24 0700 - 07/25 0659 07/25 0700 - 07/26 0659    P.O.       NG/GT       IV Piggyback 200 50     .3 1382.5     Total Intake(mL/kg) 346.3 (4.9) 1432.5 (20.3)     Urine (mL/kg/hr)  0 (0)     Emesis/NG output  0     Drains       Other  0     Stool  0     Blood  0     Total Output  0     Net +346.3 +1432.5            Urine Occurrence 2 x 7 x     Stool Occurrence  2 x 0 x    Emesis Occurrence  0 x           Physical Exam  Constitutional:       Appearance: She is well-developed. She is not ill-appearing.   HENT:       Head: Normocephalic.   Eyes:      Pupils: Pupils are equal, round, and reactive to light.   Cardiovascular:      Rate and Rhythm: Normal rate and regular rhythm.      Heart sounds: Normal heart sounds.   Pulmonary:      Effort: Pulmonary effort is normal. No respiratory distress.      Breath sounds: Normal breath sounds. No wheezing or rales.   Abdominal:      Palpations: Abdomen is soft. There is no mass.      Tenderness: There is no guarding or rebound.   Skin:     General: Skin is warm and dry.   Neurological:      Mental Status: She is alert and oriented to person, place, and time.   Psychiatric:         Behavior: Behavior normal.         Thought Content: Thought content normal.         Judgment: Judgment normal.       Significant Labs:  BMP (Last 3 Results):   Recent Labs   Lab 07/23/20  0432 07/24/20  0350 07/25/20  0305   *  164* 139*  139* 160*  160*     136 134*  134* 134*  134*   K 4.3  4.3 4.6  4.6 4.8  4.8   CL 96  96 98  98 103  103   CO2 32*  32* 28  28 22*  22*   BUN 37*  37* 40*  40* 33*  33*   CREATININE 1.2  1.2 1.3  1.3 1.1  1.1   CALCIUM 9.5  9.5 9.3  9.3 8.9  8.9   MG 2.2  2.2 2.2  2.2 2.0     CBC (Last 3 Results):   Recent Labs   Lab 07/23/20  0432 07/24/20  0350 07/25/20  0305   WBC 8.21 8.29 8.24   RBC 4.35 4.19 3.98*   HGB 9.9* 9.8* 9.2*   HCT 33.1* 32.3* 30.6*   * 359* 357*   MCV 76* 77* 77*   MCH 22.8* 23.4* 23.1*   MCHC 29.9* 30.3* 30.1*       Significant Diagnostics:  I have reviewed all pertinent imaging results/findings within the past 24 hours.    Assessment/Plan:     * Cutaneous fistula  Surgical intervention potentially planned for next friday  Continue TPN  Continue to clears with boost  Pain control  IVF - replace losses    Chronic kidney disease, stage 3  Monitor labs  Monitor I&O    Generalized abdominal pain  Continue IV pain meds  Begin to wean pain meds    Essential hypertension  Monitor BP  Home meds    Type 2 diabetes  mellitus without complication, with long-term current use of insulin  SSI  Accu check Q6          Jose Fuentes MD  Colorectal Surgery  [facility]

## 2020-07-25 NOTE — PLAN OF CARE
Patient A/O x4. Patient C/o abdominal pain. Pain meds given. TPN going at 75 cc. PICC line patent. Patient up to toilet with standby assist. Patient eating small amounts of CLD due feeling a bit nauseas. Anti-emetics given. Call Light placed in reach. Frequent rounding done. Will continue to monitor.

## 2020-07-26 LAB
ALBUMIN SERPL BCP-MCNC: 2.5 G/DL (ref 3.5–5.2)
ALP SERPL-CCNC: 94 U/L (ref 55–135)
ALT SERPL W/O P-5'-P-CCNC: 20 U/L (ref 10–44)
ANION GAP SERPL CALC-SCNC: 5 MMOL/L (ref 8–16)
AST SERPL-CCNC: 18 U/L (ref 10–40)
BASOPHILS # BLD AUTO: 0.03 K/UL (ref 0–0.2)
BASOPHILS NFR BLD: 0.5 % (ref 0–1.9)
BILIRUB SERPL-MCNC: 0.2 MG/DL (ref 0.1–1)
BUN SERPL-MCNC: 20 MG/DL (ref 8–23)
CALCIUM SERPL-MCNC: 8.5 MG/DL (ref 8.7–10.5)
CHLORIDE SERPL-SCNC: 102 MMOL/L (ref 95–110)
CO2 SERPL-SCNC: 24 MMOL/L (ref 23–29)
CREAT SERPL-MCNC: 1 MG/DL (ref 0.5–1.4)
DIFFERENTIAL METHOD: ABNORMAL
EOSINOPHIL # BLD AUTO: 0.2 K/UL (ref 0–0.5)
EOSINOPHIL NFR BLD: 4.2 % (ref 0–8)
ERYTHROCYTE [DISTWIDTH] IN BLOOD BY AUTOMATED COUNT: 16 % (ref 11.5–14.5)
EST. GFR  (AFRICAN AMERICAN): >60 ML/MIN/1.73 M^2
EST. GFR  (NON AFRICAN AMERICAN): 57.6 ML/MIN/1.73 M^2
GLUCOSE SERPL-MCNC: 170 MG/DL (ref 70–110)
HCT VFR BLD AUTO: 27.3 % (ref 37–48.5)
HGB BLD-MCNC: 8.3 G/DL (ref 12–16)
IMM GRANULOCYTES # BLD AUTO: 0.01 K/UL (ref 0–0.04)
IMM GRANULOCYTES NFR BLD AUTO: 0.2 % (ref 0–0.5)
LYMPHOCYTES # BLD AUTO: 1.8 K/UL (ref 1–4.8)
LYMPHOCYTES NFR BLD: 31.1 % (ref 18–48)
MAGNESIUM SERPL-MCNC: 1.8 MG/DL (ref 1.6–2.6)
MCH RBC QN AUTO: 23.3 PG (ref 27–31)
MCHC RBC AUTO-ENTMCNC: 30.4 G/DL (ref 32–36)
MCV RBC AUTO: 77 FL (ref 82–98)
MONOCYTES # BLD AUTO: 0.5 K/UL (ref 0.3–1)
MONOCYTES NFR BLD: 9.1 % (ref 4–15)
NEUTROPHILS # BLD AUTO: 3.2 K/UL (ref 1.8–7.7)
NEUTROPHILS NFR BLD: 54.9 % (ref 38–73)
NRBC BLD-RTO: 0 /100 WBC
PHOSPHATE SERPL-MCNC: 2.9 MG/DL (ref 2.7–4.5)
PLATELET # BLD AUTO: 293 K/UL (ref 150–350)
PMV BLD AUTO: 8.3 FL (ref 9.2–12.9)
POCT GLUCOSE: 170 MG/DL (ref 70–110)
POCT GLUCOSE: 176 MG/DL (ref 70–110)
POCT GLUCOSE: 177 MG/DL (ref 70–110)
POCT GLUCOSE: 196 MG/DL (ref 70–110)
POTASSIUM SERPL-SCNC: 4.5 MMOL/L (ref 3.5–5.1)
PROT SERPL-MCNC: 7.1 G/DL (ref 6–8.4)
RBC # BLD AUTO: 3.56 M/UL (ref 4–5.4)
SODIUM SERPL-SCNC: 131 MMOL/L (ref 136–145)
WBC # BLD AUTO: 5.73 K/UL (ref 3.9–12.7)

## 2020-07-26 PROCEDURE — 20600001 HC STEP DOWN PRIVATE ROOM

## 2020-07-26 PROCEDURE — A4216 STERILE WATER/SALINE, 10 ML: HCPCS | Performed by: COLON & RECTAL SURGERY

## 2020-07-26 PROCEDURE — 63600175 PHARM REV CODE 636 W HCPCS: Performed by: NURSE PRACTITIONER

## 2020-07-26 PROCEDURE — 25000003 PHARM REV CODE 250: Performed by: STUDENT IN AN ORGANIZED HEALTH CARE EDUCATION/TRAINING PROGRAM

## 2020-07-26 PROCEDURE — A4217 STERILE WATER/SALINE, 500 ML: HCPCS | Performed by: STUDENT IN AN ORGANIZED HEALTH CARE EDUCATION/TRAINING PROGRAM

## 2020-07-26 PROCEDURE — 85025 COMPLETE CBC W/AUTO DIFF WBC: CPT

## 2020-07-26 PROCEDURE — 80053 COMPREHEN METABOLIC PANEL: CPT

## 2020-07-26 PROCEDURE — 84100 ASSAY OF PHOSPHORUS: CPT

## 2020-07-26 PROCEDURE — 83735 ASSAY OF MAGNESIUM: CPT

## 2020-07-26 PROCEDURE — 25000003 PHARM REV CODE 250: Performed by: COLON & RECTAL SURGERY

## 2020-07-26 PROCEDURE — 63600175 PHARM REV CODE 636 W HCPCS: Performed by: STUDENT IN AN ORGANIZED HEALTH CARE EDUCATION/TRAINING PROGRAM

## 2020-07-26 RX ADMIN — Medication 10 ML: at 06:07

## 2020-07-26 RX ADMIN — Medication 10 ML: at 12:07

## 2020-07-26 RX ADMIN — PROMETHAZINE HYDROCHLORIDE 6.25 MG: 25 INJECTION INTRAMUSCULAR; INTRAVENOUS at 02:07

## 2020-07-26 RX ADMIN — ENOXAPARIN SODIUM 40 MG: 100 INJECTION SUBCUTANEOUS at 04:07

## 2020-07-26 RX ADMIN — HYDROMORPHONE HYDROCHLORIDE 0.8 MG: 1 INJECTION, SOLUTION INTRAMUSCULAR; INTRAVENOUS; SUBCUTANEOUS at 04:07

## 2020-07-26 RX ADMIN — HYDROMORPHONE HYDROCHLORIDE 0.8 MG: 1 INJECTION, SOLUTION INTRAMUSCULAR; INTRAVENOUS; SUBCUTANEOUS at 02:07

## 2020-07-26 RX ADMIN — HYDROMORPHONE HYDROCHLORIDE 0.8 MG: 1 INJECTION, SOLUTION INTRAMUSCULAR; INTRAVENOUS; SUBCUTANEOUS at 11:07

## 2020-07-26 RX ADMIN — Medication 10 ML: at 08:07

## 2020-07-26 RX ADMIN — Medication 10 ML: at 11:07

## 2020-07-26 RX ADMIN — HYDROMORPHONE HYDROCHLORIDE 0.8 MG: 1 INJECTION, SOLUTION INTRAMUSCULAR; INTRAVENOUS; SUBCUTANEOUS at 08:07

## 2020-07-26 RX ADMIN — ONDANSETRON 8 MG: 2 INJECTION INTRAMUSCULAR; INTRAVENOUS at 11:07

## 2020-07-26 RX ADMIN — ONDANSETRON 8 MG: 2 INJECTION INTRAMUSCULAR; INTRAVENOUS at 08:07

## 2020-07-26 RX ADMIN — MAGNESIUM SULFATE HEPTAHYDRATE: 500 INJECTION, SOLUTION INTRAMUSCULAR; INTRAVENOUS at 10:07

## 2020-07-26 RX ADMIN — HYDROMORPHONE HYDROCHLORIDE 0.8 MG: 1 INJECTION, SOLUTION INTRAMUSCULAR; INTRAVENOUS; SUBCUTANEOUS at 01:07

## 2020-07-26 RX ADMIN — PROMETHAZINE HYDROCHLORIDE 6.25 MG: 25 INJECTION INTRAMUSCULAR; INTRAVENOUS at 11:07

## 2020-07-26 RX ADMIN — CARVEDILOL 25 MG: 25 TABLET, FILM COATED ORAL at 08:07

## 2020-07-26 NOTE — SUBJECTIVE & OBJECTIVE
Subjective:     Interval History: Tolerating clears, improved nausea this morning. Says is feeling much better and would like to try some boost.     Post-Op Info:  Procedure(s) (LRB):  RESECTION, COLON, LOW ANTERIOR, possible loop ileostomy, ERAS high (N/A)          Medications:  Continuous Infusions:   TPN ADULT CENTRAL LINE CUSTOM 75 mL/hr at 07/25/20 2300    TPN ADULT CENTRAL LINE CUSTOM       Scheduled Meds:   carvediloL  25 mg Oral Daily    enoxaparin  40 mg Subcutaneous Q24H    sodium chloride 0.9%  10 mL Intravenous Q6H     PRN Meds:   benzocaine    dextrose 50%    dextrose 50%    glucagon (human recombinant)    glucagon (human recombinant)    HYDROmorphone    HYDROmorphone    insulin aspart U-100    insulin aspart U-100    ondansetron    promethazine (PHENERGAN) IVPB    sodium chloride 0.9%        Objective:     Vital Signs (Most Recent):  Temp: 97.2 °F (36.2 °C) (07/26/20 1214)  Pulse: 80 (07/26/20 1214)  Resp: 16 (07/26/20 1413)  BP: 124/65 (07/26/20 1214)  SpO2: 95 % (07/26/20 1214) Vital Signs (24h Range):  Temp:  [97.2 °F (36.2 °C)-98.6 °F (37 °C)] 97.2 °F (36.2 °C)  Pulse:  [73-85] 80  Resp:  [16-20] 16  SpO2:  [95 %-99 %] 95 %  BP: (111-149)/(63-74) 124/65     Intake/Output - Last 3 Shifts       07/24 0700 - 07/25 0659 07/25 0700 - 07/26 0659 07/26 0700 - 07/27 0659    P.O.  0 480    IV Piggyback 50      TPN 1382.5 2352.5 506.3    Total Intake(mL/kg) 1432.5 (20.3) 2352.5 (33.3) 986.3 (13.9)    Urine (mL/kg/hr) 0 (0)      Emesis/NG output 0      Other 0      Stool 0      Blood 0      Total Output 0      Net +1432.5 +2352.5 +986.3           Urine Occurrence 7 x 8 x 2 x    Stool Occurrence 2 x 0 x 0 x    Emesis Occurrence 0 x            Physical Exam  Constitutional:       Appearance: She is well-developed. She is not ill-appearing.   HENT:      Head: Normocephalic.   Eyes:      Pupils: Pupils are equal, round, and reactive to light.   Cardiovascular:      Rate and Rhythm: Normal rate  and regular rhythm.      Heart sounds: Normal heart sounds.   Pulmonary:      Effort: Pulmonary effort is normal. No respiratory distress.      Breath sounds: Normal breath sounds. No wheezing or rales.   Abdominal:      Palpations: Abdomen is soft. There is no mass.      Tenderness: There is no guarding or rebound.   Skin:     General: Skin is warm and dry.   Neurological:      Mental Status: She is alert and oriented to person, place, and time.   Psychiatric:         Behavior: Behavior normal.         Thought Content: Thought content normal.         Judgment: Judgment normal.       Significant Labs:  BMP (Last 3 Results):   Recent Labs   Lab 07/24/20  0350 07/25/20  0305 07/26/20  0428   *  139* 160*  160* 170*  170*  170*  170*   *  134* 134*  134* 131*  131*  131*  131*   K 4.6  4.6 4.8  4.8 4.5  4.5  4.5  4.5   CL 98  98 103  103 102  102  102  102   CO2 28  28 22*  22* 24  24  24  24   BUN 40*  40* 33*  33* 20  20  20  20   CREATININE 1.3  1.3 1.1  1.1 1.0  1.0  1.0  1.0   CALCIUM 9.3  9.3 8.9  8.9 8.5*  8.5*  8.5*  8.5*   MG 2.2  2.2 2.0 1.8  1.8  1.8  1.8  1.8     CBC (Last 3 Results):   Recent Labs   Lab 07/24/20 0350 07/25/20 0305 07/26/20  0428   WBC 8.29 8.24 5.73   RBC 4.19 3.98* 3.56*   HGB 9.8* 9.2* 8.3*   HCT 32.3* 30.6* 27.3*   * 357* 293   MCV 77* 77* 77*   MCH 23.4* 23.1* 23.3*   MCHC 30.3* 30.1* 30.4*       Significant Diagnostics:  I have reviewed all pertinent imaging results/findings within the past 24 hours.

## 2020-07-26 NOTE — PLAN OF CARE
Vs stable. C/o moderate abdominal pain, relieved with Dilaudid IVP. TPN at 75ml/hr, glucose stable. Getting OOB to bathroom. Patient didn't drink water overnight, No BM overnight. Abdomen dressing dry, intact.

## 2020-07-26 NOTE — PROGRESS NOTES
[facility]  ColorectalSurgery  Progress Note    Subjective:       Post-Op Info:  Procedure(s) (LRB):  RESECTION, COLON, LOW ANTERIOR, possible loop ileostomy, ERAS high (N/A)           Subjective:     Interval History: Tolerating clears, improved nausea this morning. Says is feeling much better and would like to try some boost.     Post-Op Info:  Procedure(s) (LRB):  RESECTION, COLON, LOW ANTERIOR, possible loop ileostomy, ERAS high (N/A)          Medications:  Continuous Infusions:   TPN ADULT CENTRAL LINE CUSTOM 75 mL/hr at 07/25/20 2300    TPN ADULT CENTRAL LINE CUSTOM       Scheduled Meds:   carvediloL  25 mg Oral Daily    enoxaparin  40 mg Subcutaneous Q24H    sodium chloride 0.9%  10 mL Intravenous Q6H     PRN Meds:   benzocaine    dextrose 50%    dextrose 50%    glucagon (human recombinant)    glucagon (human recombinant)    HYDROmorphone    HYDROmorphone    insulin aspart U-100    insulin aspart U-100    ondansetron    promethazine (PHENERGAN) IVPB    sodium chloride 0.9%        Objective:     Vital Signs (Most Recent):  Temp: 97.2 °F (36.2 °C) (07/26/20 1214)  Pulse: 80 (07/26/20 1214)  Resp: 16 (07/26/20 1413)  BP: 124/65 (07/26/20 1214)  SpO2: 95 % (07/26/20 1214) Vital Signs (24h Range):  Temp:  [97.2 °F (36.2 °C)-98.6 °F (37 °C)] 97.2 °F (36.2 °C)  Pulse:  [73-85] 80  Resp:  [16-20] 16  SpO2:  [95 %-99 %] 95 %  BP: (111-149)/(63-74) 124/65     Intake/Output - Last 3 Shifts       07/24 0700 - 07/25 0659 07/25 0700 - 07/26 0659 07/26 0700 - 07/27 0659    P.O.  0 480    IV Piggyback 50      TPN 1382.5 2352.5 506.3    Total Intake(mL/kg) 1432.5 (20.3) 2352.5 (33.3) 986.3 (13.9)    Urine (mL/kg/hr) 0 (0)      Emesis/NG output 0      Other 0      Stool 0      Blood 0      Total Output 0      Net +1432.5 +2352.5 +986.3           Urine Occurrence 7 x 8 x 2 x    Stool Occurrence 2 x 0 x 0 x    Emesis Occurrence 0 x            Physical Exam  Constitutional:       Appearance:  She is well-developed. She is not ill-appearing.   HENT:      Head: Normocephalic.   Eyes:      Pupils: Pupils are equal, round, and reactive to light.   Cardiovascular:      Rate and Rhythm: Normal rate and regular rhythm.      Heart sounds: Normal heart sounds.   Pulmonary:      Effort: Pulmonary effort is normal. No respiratory distress.      Breath sounds: Normal breath sounds. No wheezing or rales.   Abdominal:      Palpations: Abdomen is soft. There is no mass.      Tenderness: There is no guarding or rebound.   Skin:     General: Skin is warm and dry.   Neurological:      Mental Status: She is alert and oriented to person, place, and time.   Psychiatric:         Behavior: Behavior normal.         Thought Content: Thought content normal.         Judgment: Judgment normal.       Significant Labs:  BMP (Last 3 Results):   Recent Labs   Lab 07/24/20 0350 07/25/20 0305 07/26/20  0428   *  139* 160*  160* 170*  170*  170*  170*   *  134* 134*  134* 131*  131*  131*  131*   K 4.6  4.6 4.8  4.8 4.5  4.5  4.5  4.5   CL 98  98 103  103 102  102  102  102   CO2 28  28 22*  22* 24  24  24  24   BUN 40*  40* 33*  33* 20  20  20  20   CREATININE 1.3  1.3 1.1  1.1 1.0  1.0  1.0  1.0   CALCIUM 9.3  9.3 8.9  8.9 8.5*  8.5*  8.5*  8.5*   MG 2.2  2.2 2.0 1.8  1.8  1.8  1.8  1.8     CBC (Last 3 Results):   Recent Labs   Lab 07/24/20 0350 07/25/20 0305 07/26/20  0428   WBC 8.29 8.24 5.73   RBC 4.19 3.98* 3.56*   HGB 9.8* 9.2* 8.3*   HCT 32.3* 30.6* 27.3*   * 357* 293   MCV 77* 77* 77*   MCH 23.4* 23.1* 23.3*   MCHC 30.3* 30.1* 30.4*       Significant Diagnostics:  I have reviewed all pertinent imaging results/findings within the past 24 hours.    Assessment/Plan:     * Cutaneous fistula  * Cutaneous fistula  Surgical intervention potentially planned for next friday  Continue TPN  Continue to clears with boost  Pain control  IVF - replace losses     Chronic  kidney disease, stage 3  Monitor labs  Monitor I&O     Generalized abdominal pain  Continue IV pain meds  Begin to wean pain meds     Essential hypertension  Monitor BP  Home meds     Type 2 diabetes mellitus without complication, with long-term current use of insulin  SSI  Accu check Q6        Emilia Durham MD  General Surgery  [facility]

## 2020-07-26 NOTE — ASSESSMENT & PLAN NOTE
* Cutaneous fistula  Surgical intervention potentially planned for next friday  Continue TPN  Continue to clears with boost  Pain control  IVF - replace losses     Chronic kidney disease, stage 3  Monitor labs  Monitor I&O     Generalized abdominal pain  Continue IV pain meds  Begin to wean pain meds     Essential hypertension  Monitor BP  Home meds     Type 2 diabetes mellitus without complication, with long-term current use of insulin  SSI  Accu check Q6

## 2020-07-26 NOTE — PLAN OF CARE
Patient A/O x4. Patient c/o abdominal pain. Pain meds given. Mild intermittent nausea. Anti-emetics given. PICC line with TPN running at 75 cc. Up to toilet independently. Call light placed in reach. Frequent rounding done.

## 2020-07-27 LAB
ALBUMIN SERPL BCP-MCNC: 2.4 G/DL (ref 3.5–5.2)
ALP SERPL-CCNC: 84 U/L (ref 55–135)
ALT SERPL W/O P-5'-P-CCNC: 19 U/L (ref 10–44)
ANION GAP SERPL CALC-SCNC: 4 MMOL/L (ref 8–16)
ANION GAP SERPL CALC-SCNC: 4 MMOL/L (ref 8–16)
AST SERPL-CCNC: 18 U/L (ref 10–40)
BASOPHILS # BLD AUTO: 0.02 K/UL (ref 0–0.2)
BASOPHILS NFR BLD: 0.3 % (ref 0–1.9)
BILIRUB SERPL-MCNC: 0.2 MG/DL (ref 0.1–1)
BUN SERPL-MCNC: 17 MG/DL (ref 8–23)
BUN SERPL-MCNC: 17 MG/DL (ref 8–23)
CALCIUM SERPL-MCNC: 8.2 MG/DL (ref 8.7–10.5)
CALCIUM SERPL-MCNC: 8.2 MG/DL (ref 8.7–10.5)
CHLORIDE SERPL-SCNC: 103 MMOL/L (ref 95–110)
CHLORIDE SERPL-SCNC: 103 MMOL/L (ref 95–110)
CO2 SERPL-SCNC: 25 MMOL/L (ref 23–29)
CO2 SERPL-SCNC: 25 MMOL/L (ref 23–29)
CREAT SERPL-MCNC: 0.9 MG/DL (ref 0.5–1.4)
CREAT SERPL-MCNC: 0.9 MG/DL (ref 0.5–1.4)
DIFFERENTIAL METHOD: ABNORMAL
EOSINOPHIL # BLD AUTO: 0.3 K/UL (ref 0–0.5)
EOSINOPHIL NFR BLD: 4.4 % (ref 0–8)
ERYTHROCYTE [DISTWIDTH] IN BLOOD BY AUTOMATED COUNT: 16.3 % (ref 11.5–14.5)
EST. GFR  (AFRICAN AMERICAN): >60 ML/MIN/1.73 M^2
EST. GFR  (AFRICAN AMERICAN): >60 ML/MIN/1.73 M^2
EST. GFR  (NON AFRICAN AMERICAN): >60 ML/MIN/1.73 M^2
EST. GFR  (NON AFRICAN AMERICAN): >60 ML/MIN/1.73 M^2
GLUCOSE SERPL-MCNC: 137 MG/DL (ref 70–110)
GLUCOSE SERPL-MCNC: 137 MG/DL (ref 70–110)
HCT VFR BLD AUTO: 25.8 % (ref 37–48.5)
HGB BLD-MCNC: 8 G/DL (ref 12–16)
IMM GRANULOCYTES # BLD AUTO: 0.02 K/UL (ref 0–0.04)
IMM GRANULOCYTES NFR BLD AUTO: 0.3 % (ref 0–0.5)
LYMPHOCYTES # BLD AUTO: 1.9 K/UL (ref 1–4.8)
LYMPHOCYTES NFR BLD: 27.9 % (ref 18–48)
MAGNESIUM SERPL-MCNC: 1.6 MG/DL (ref 1.6–2.6)
MCH RBC QN AUTO: 23.7 PG (ref 27–31)
MCHC RBC AUTO-ENTMCNC: 31 G/DL (ref 32–36)
MCV RBC AUTO: 76 FL (ref 82–98)
MONOCYTES # BLD AUTO: 0.6 K/UL (ref 0.3–1)
MONOCYTES NFR BLD: 8.6 % (ref 4–15)
NEUTROPHILS # BLD AUTO: 4 K/UL (ref 1.8–7.7)
NEUTROPHILS NFR BLD: 58.5 % (ref 38–73)
NRBC BLD-RTO: 0 /100 WBC
PHOSPHATE SERPL-MCNC: 3.1 MG/DL (ref 2.7–4.5)
PLATELET # BLD AUTO: 297 K/UL (ref 150–350)
PMV BLD AUTO: 8.5 FL (ref 9.2–12.9)
POCT GLUCOSE: 143 MG/DL (ref 70–110)
POCT GLUCOSE: 148 MG/DL (ref 70–110)
POCT GLUCOSE: 169 MG/DL (ref 70–110)
POCT GLUCOSE: 189 MG/DL (ref 70–110)
POCT GLUCOSE: 202 MG/DL (ref 70–110)
POTASSIUM SERPL-SCNC: 4.3 MMOL/L (ref 3.5–5.1)
POTASSIUM SERPL-SCNC: 4.3 MMOL/L (ref 3.5–5.1)
PROT SERPL-MCNC: 6.9 G/DL (ref 6–8.4)
RBC # BLD AUTO: 3.38 M/UL (ref 4–5.4)
SODIUM SERPL-SCNC: 132 MMOL/L (ref 136–145)
SODIUM SERPL-SCNC: 132 MMOL/L (ref 136–145)
TRIGL SERPL-MCNC: 294 MG/DL (ref 30–150)
WBC # BLD AUTO: 6.85 K/UL (ref 3.9–12.7)

## 2020-07-27 PROCEDURE — 83735 ASSAY OF MAGNESIUM: CPT

## 2020-07-27 PROCEDURE — 20600001 HC STEP DOWN PRIVATE ROOM

## 2020-07-27 PROCEDURE — 85025 COMPLETE CBC W/AUTO DIFF WBC: CPT

## 2020-07-27 PROCEDURE — 84100 ASSAY OF PHOSPHORUS: CPT

## 2020-07-27 PROCEDURE — 25000003 PHARM REV CODE 250: Performed by: STUDENT IN AN ORGANIZED HEALTH CARE EDUCATION/TRAINING PROGRAM

## 2020-07-27 PROCEDURE — 63600175 PHARM REV CODE 636 W HCPCS: Performed by: STUDENT IN AN ORGANIZED HEALTH CARE EDUCATION/TRAINING PROGRAM

## 2020-07-27 PROCEDURE — B4185 PARENTERAL SOL 10 GM LIPIDS: HCPCS | Performed by: SURGERY

## 2020-07-27 PROCEDURE — 25000003 PHARM REV CODE 250: Performed by: COLON & RECTAL SURGERY

## 2020-07-27 PROCEDURE — 63600175 PHARM REV CODE 636 W HCPCS: Performed by: SURGERY

## 2020-07-27 PROCEDURE — 84478 ASSAY OF TRIGLYCERIDES: CPT

## 2020-07-27 PROCEDURE — A4217 STERILE WATER/SALINE, 500 ML: HCPCS | Performed by: SURGERY

## 2020-07-27 PROCEDURE — 80053 COMPREHEN METABOLIC PANEL: CPT

## 2020-07-27 PROCEDURE — 25000003 PHARM REV CODE 250: Performed by: SURGERY

## 2020-07-27 PROCEDURE — A4216 STERILE WATER/SALINE, 10 ML: HCPCS | Performed by: COLON & RECTAL SURGERY

## 2020-07-27 PROCEDURE — 63600175 PHARM REV CODE 636 W HCPCS: Performed by: NURSE PRACTITIONER

## 2020-07-27 RX ADMIN — I.V. FAT EMULSION 250 ML: 20 EMULSION INTRAVENOUS at 09:07

## 2020-07-27 RX ADMIN — HYDROMORPHONE HYDROCHLORIDE 0.8 MG: 1 INJECTION, SOLUTION INTRAMUSCULAR; INTRAVENOUS; SUBCUTANEOUS at 09:07

## 2020-07-27 RX ADMIN — HYDROMORPHONE HYDROCHLORIDE 0.8 MG: 1 INJECTION, SOLUTION INTRAMUSCULAR; INTRAVENOUS; SUBCUTANEOUS at 05:07

## 2020-07-27 RX ADMIN — HYDROMORPHONE HYDROCHLORIDE 0.8 MG: 1 INJECTION, SOLUTION INTRAMUSCULAR; INTRAVENOUS; SUBCUTANEOUS at 02:07

## 2020-07-27 RX ADMIN — HYDROMORPHONE HYDROCHLORIDE 0.8 MG: 1 INJECTION, SOLUTION INTRAMUSCULAR; INTRAVENOUS; SUBCUTANEOUS at 12:07

## 2020-07-27 RX ADMIN — CARVEDILOL 25 MG: 25 TABLET, FILM COATED ORAL at 09:07

## 2020-07-27 RX ADMIN — MAGNESIUM SULFATE HEPTAHYDRATE: 500 INJECTION, SOLUTION INTRAMUSCULAR; INTRAVENOUS at 09:07

## 2020-07-27 RX ADMIN — PROMETHAZINE HYDROCHLORIDE 6.25 MG: 25 INJECTION INTRAMUSCULAR; INTRAVENOUS at 06:07

## 2020-07-27 RX ADMIN — ONDANSETRON 8 MG: 2 INJECTION INTRAMUSCULAR; INTRAVENOUS at 05:07

## 2020-07-27 RX ADMIN — ENOXAPARIN SODIUM 40 MG: 100 INJECTION SUBCUTANEOUS at 04:07

## 2020-07-27 RX ADMIN — ONDANSETRON 8 MG: 2 INJECTION INTRAMUSCULAR; INTRAVENOUS at 04:07

## 2020-07-27 RX ADMIN — HYDROMORPHONE HYDROCHLORIDE 0.8 MG: 1 INJECTION, SOLUTION INTRAMUSCULAR; INTRAVENOUS; SUBCUTANEOUS at 04:07

## 2020-07-27 RX ADMIN — INSULIN ASPART 2 UNITS: 100 INJECTION, SOLUTION INTRAVENOUS; SUBCUTANEOUS at 11:07

## 2020-07-27 RX ADMIN — ONDANSETRON 8 MG: 2 INJECTION INTRAMUSCULAR; INTRAVENOUS at 10:07

## 2020-07-27 RX ADMIN — Medication 10 ML: at 12:07

## 2020-07-27 RX ADMIN — Medication 10 ML: at 06:07

## 2020-07-27 NOTE — PLAN OF CARE
POC reviewed with pt who verbalized understanding. VSS on RA - Palestinian speaking. AAOX4. Remains free of falls and injury. Pt up stand by assist to toilet (hat placed), encouraged ambulation.     - abdominal wounds, pt does dsg changes self, foul odor  - LT UA PICC   - giving IV dilaudid OTC Q 3hr  - procedure scheduled for 7/31    TPN infusing. Tolerating full liquid diet, denies nausea. Pain controlled. Blood glucose being monitored. Patient denies chest pain & SOB. Refusing SCDs in place, states she is ambulating. No acute events. No distress noted. Bed in lowest position, call light within reach, frequent rounds made for safety.     WCTM.

## 2020-07-27 NOTE — PLAN OF CARE
Patient A/O x4. Patient c/o abdominal pain. Pain meds given. Mild intermittent nausea. Anti-emetics given. PICC line with TPN running at 75 cc. Up to toilet independently. Call light placed in reach. No adverse s/s noted , pt left in comfort, Frequent rounding done. WCTM

## 2020-07-28 LAB
ALBUMIN SERPL BCP-MCNC: 2.5 G/DL (ref 3.5–5.2)
ALP SERPL-CCNC: 94 U/L (ref 55–135)
ALT SERPL W/O P-5'-P-CCNC: 17 U/L (ref 10–44)
ANION GAP SERPL CALC-SCNC: 9 MMOL/L (ref 8–16)
ANION GAP SERPL CALC-SCNC: 9 MMOL/L (ref 8–16)
AST SERPL-CCNC: 16 U/L (ref 10–40)
BASOPHILS # BLD AUTO: 0.03 K/UL (ref 0–0.2)
BASOPHILS NFR BLD: 0.5 % (ref 0–1.9)
BILIRUB SERPL-MCNC: 0.1 MG/DL (ref 0.1–1)
BUN SERPL-MCNC: 13 MG/DL (ref 8–23)
BUN SERPL-MCNC: 13 MG/DL (ref 8–23)
CALCIUM SERPL-MCNC: 8.6 MG/DL (ref 8.7–10.5)
CALCIUM SERPL-MCNC: 8.6 MG/DL (ref 8.7–10.5)
CHLORIDE SERPL-SCNC: 104 MMOL/L (ref 95–110)
CHLORIDE SERPL-SCNC: 104 MMOL/L (ref 95–110)
CO2 SERPL-SCNC: 22 MMOL/L (ref 23–29)
CO2 SERPL-SCNC: 22 MMOL/L (ref 23–29)
CREAT SERPL-MCNC: 1 MG/DL (ref 0.5–1.4)
CREAT SERPL-MCNC: 1 MG/DL (ref 0.5–1.4)
DIFFERENTIAL METHOD: ABNORMAL
EOSINOPHIL # BLD AUTO: 0.3 K/UL (ref 0–0.5)
EOSINOPHIL NFR BLD: 5 % (ref 0–8)
ERYTHROCYTE [DISTWIDTH] IN BLOOD BY AUTOMATED COUNT: 16.4 % (ref 11.5–14.5)
EST. GFR  (AFRICAN AMERICAN): >60 ML/MIN/1.73 M^2
EST. GFR  (AFRICAN AMERICAN): >60 ML/MIN/1.73 M^2
EST. GFR  (NON AFRICAN AMERICAN): 57.6 ML/MIN/1.73 M^2
EST. GFR  (NON AFRICAN AMERICAN): 57.6 ML/MIN/1.73 M^2
GLUCOSE SERPL-MCNC: 143 MG/DL (ref 70–110)
GLUCOSE SERPL-MCNC: 143 MG/DL (ref 70–110)
HCT VFR BLD AUTO: 27.1 % (ref 37–48.5)
HGB BLD-MCNC: 8.4 G/DL (ref 12–16)
IMM GRANULOCYTES # BLD AUTO: 0.02 K/UL (ref 0–0.04)
IMM GRANULOCYTES NFR BLD AUTO: 0.3 % (ref 0–0.5)
LYMPHOCYTES # BLD AUTO: 1.7 K/UL (ref 1–4.8)
LYMPHOCYTES NFR BLD: 27.1 % (ref 18–48)
MAGNESIUM SERPL-MCNC: 1.7 MG/DL (ref 1.6–2.6)
MCH RBC QN AUTO: 23.5 PG (ref 27–31)
MCHC RBC AUTO-ENTMCNC: 31 G/DL (ref 32–36)
MCV RBC AUTO: 76 FL (ref 82–98)
MONOCYTES # BLD AUTO: 0.5 K/UL (ref 0.3–1)
MONOCYTES NFR BLD: 8 % (ref 4–15)
NEUTROPHILS # BLD AUTO: 3.8 K/UL (ref 1.8–7.7)
NEUTROPHILS NFR BLD: 59.1 % (ref 38–73)
NRBC BLD-RTO: 0 /100 WBC
PHOSPHATE SERPL-MCNC: 3.5 MG/DL (ref 2.7–4.5)
PLATELET # BLD AUTO: 295 K/UL (ref 150–350)
PMV BLD AUTO: 8.2 FL (ref 9.2–12.9)
POCT GLUCOSE: 162 MG/DL (ref 70–110)
POCT GLUCOSE: 191 MG/DL (ref 70–110)
POCT GLUCOSE: 195 MG/DL (ref 70–110)
POTASSIUM SERPL-SCNC: 4.1 MMOL/L (ref 3.5–5.1)
POTASSIUM SERPL-SCNC: 4.1 MMOL/L (ref 3.5–5.1)
PROT SERPL-MCNC: 7.4 G/DL (ref 6–8.4)
RBC # BLD AUTO: 3.57 M/UL (ref 4–5.4)
SODIUM SERPL-SCNC: 135 MMOL/L (ref 136–145)
SODIUM SERPL-SCNC: 135 MMOL/L (ref 136–145)
WBC # BLD AUTO: 6.41 K/UL (ref 3.9–12.7)

## 2020-07-28 PROCEDURE — 25000003 PHARM REV CODE 250: Performed by: COLON & RECTAL SURGERY

## 2020-07-28 PROCEDURE — 63600175 PHARM REV CODE 636 W HCPCS: Performed by: NURSE PRACTITIONER

## 2020-07-28 PROCEDURE — 25000003 PHARM REV CODE 250: Performed by: SURGERY

## 2020-07-28 PROCEDURE — A4217 STERILE WATER/SALINE, 500 ML: HCPCS | Performed by: SURGERY

## 2020-07-28 PROCEDURE — 25000003 PHARM REV CODE 250: Performed by: STUDENT IN AN ORGANIZED HEALTH CARE EDUCATION/TRAINING PROGRAM

## 2020-07-28 PROCEDURE — 83735 ASSAY OF MAGNESIUM: CPT

## 2020-07-28 PROCEDURE — 63600175 PHARM REV CODE 636 W HCPCS: Performed by: STUDENT IN AN ORGANIZED HEALTH CARE EDUCATION/TRAINING PROGRAM

## 2020-07-28 PROCEDURE — 80053 COMPREHEN METABOLIC PANEL: CPT

## 2020-07-28 PROCEDURE — A4216 STERILE WATER/SALINE, 10 ML: HCPCS | Performed by: COLON & RECTAL SURGERY

## 2020-07-28 PROCEDURE — 63600175 PHARM REV CODE 636 W HCPCS: Performed by: SURGERY

## 2020-07-28 PROCEDURE — 20600001 HC STEP DOWN PRIVATE ROOM

## 2020-07-28 PROCEDURE — 84100 ASSAY OF PHOSPHORUS: CPT

## 2020-07-28 PROCEDURE — 85025 COMPLETE CBC W/AUTO DIFF WBC: CPT

## 2020-07-28 RX ORDER — OXYCODONE HYDROCHLORIDE 10 MG/1
10 TABLET ORAL
Status: DISCONTINUED | OUTPATIENT
Start: 2020-07-28 | End: 2020-08-06

## 2020-07-28 RX ORDER — POLYETHYLENE GLYCOL 3350, SODIUM SULFATE ANHYDROUS, SODIUM BICARBONATE, SODIUM CHLORIDE, POTASSIUM CHLORIDE 236; 22.74; 6.74; 5.86; 2.97 G/4L; G/4L; G/4L; G/4L; G/4L
4000 POWDER, FOR SOLUTION ORAL ONCE
Status: COMPLETED | OUTPATIENT
Start: 2020-07-28 | End: 2020-07-28

## 2020-07-28 RX ORDER — HYDROMORPHONE HYDROCHLORIDE 1 MG/ML
0.5 INJECTION, SOLUTION INTRAMUSCULAR; INTRAVENOUS; SUBCUTANEOUS
Status: DISCONTINUED | OUTPATIENT
Start: 2020-07-28 | End: 2020-07-31

## 2020-07-28 RX ORDER — OXYCODONE HYDROCHLORIDE 5 MG/1
5 TABLET ORAL
Status: DISCONTINUED | OUTPATIENT
Start: 2020-07-28 | End: 2020-08-06

## 2020-07-28 RX ADMIN — INSULIN ASPART 2 UNITS: 100 INJECTION, SOLUTION INTRAVENOUS; SUBCUTANEOUS at 09:07

## 2020-07-28 RX ADMIN — HYDROMORPHONE HYDROCHLORIDE 0.5 MG: 1 INJECTION, SOLUTION INTRAMUSCULAR; INTRAVENOUS; SUBCUTANEOUS at 05:07

## 2020-07-28 RX ADMIN — MAGNESIUM SULFATE HEPTAHYDRATE: 500 INJECTION, SOLUTION INTRAMUSCULAR; INTRAVENOUS at 09:07

## 2020-07-28 RX ADMIN — OXYCODONE HYDROCHLORIDE 10 MG: 10 TABLET ORAL at 08:07

## 2020-07-28 RX ADMIN — INSULIN ASPART 2 UNITS: 100 INJECTION, SOLUTION INTRAVENOUS; SUBCUTANEOUS at 05:07

## 2020-07-28 RX ADMIN — CARVEDILOL 25 MG: 25 TABLET, FILM COATED ORAL at 09:07

## 2020-07-28 RX ADMIN — HYDROMORPHONE HYDROCHLORIDE 0.8 MG: 1 INJECTION, SOLUTION INTRAMUSCULAR; INTRAVENOUS; SUBCUTANEOUS at 12:07

## 2020-07-28 RX ADMIN — HYDROMORPHONE HYDROCHLORIDE 0.5 MG: 1 INJECTION, SOLUTION INTRAMUSCULAR; INTRAVENOUS; SUBCUTANEOUS at 11:07

## 2020-07-28 RX ADMIN — INSULIN ASPART 2 UNITS: 100 INJECTION, SOLUTION INTRAVENOUS; SUBCUTANEOUS at 01:07

## 2020-07-28 RX ADMIN — HYDROMORPHONE HYDROCHLORIDE 0.5 MG: 1 INJECTION, SOLUTION INTRAMUSCULAR; INTRAVENOUS; SUBCUTANEOUS at 09:07

## 2020-07-28 RX ADMIN — OXYCODONE HYDROCHLORIDE 10 MG: 10 TABLET ORAL at 09:07

## 2020-07-28 RX ADMIN — ONDANSETRON 8 MG: 2 INJECTION INTRAMUSCULAR; INTRAVENOUS at 05:07

## 2020-07-28 RX ADMIN — OXYCODONE HYDROCHLORIDE 10 MG: 10 TABLET ORAL at 01:07

## 2020-07-28 RX ADMIN — Medication 10 ML: at 12:07

## 2020-07-28 RX ADMIN — HYDROMORPHONE HYDROCHLORIDE 0.5 MG: 1 INJECTION, SOLUTION INTRAMUSCULAR; INTRAVENOUS; SUBCUTANEOUS at 04:07

## 2020-07-28 RX ADMIN — ENOXAPARIN SODIUM 40 MG: 100 INJECTION SUBCUTANEOUS at 04:07

## 2020-07-28 RX ADMIN — PROMETHAZINE HYDROCHLORIDE 6.25 MG: 25 INJECTION INTRAMUSCULAR; INTRAVENOUS at 09:07

## 2020-07-28 RX ADMIN — Medication 10 ML: at 06:07

## 2020-07-28 RX ADMIN — POLYETHYLENE GLYCOL 3350, SODIUM SULFATE ANHYDROUS, SODIUM BICARBONATE, SODIUM CHLORIDE, POTASSIUM CHLORIDE 4000 ML: 236; 22.74; 6.74; 5.86; 2.97 POWDER, FOR SOLUTION ORAL at 01:07

## 2020-07-28 NOTE — SUBJECTIVE & OBJECTIVE
Subjective:     Interval History: no acute overnight events. Denies pain or nausea. Tolerating liquids    Post-Op Info:  Procedure(s) (LRB):  RESECTION, COLON, LOW ANTERIOR, possible loop ileostomy, ERAS high (N/A)          Medications:  Continuous Infusions:   TPN ADULT CENTRAL LINE CUSTOM 75 mL/hr at 07/26/20 2219    TPN ADULT CENTRAL LINE CUSTOM       Scheduled Meds:   carvediloL  25 mg Oral Daily    enoxaparin  40 mg Subcutaneous Q24H    fat emulsion 20%  250 mL Intravenous Daily    sodium chloride 0.9%  10 mL Intravenous Q6H     PRN Meds:   benzocaine    dextrose 50%    dextrose 50%    glucagon (human recombinant)    glucagon (human recombinant)    HYDROmorphone    HYDROmorphone    insulin aspart U-100    ondansetron    promethazine (PHENERGAN) IVPB    sodium chloride 0.9%        Objective:     Vital Signs (Most Recent):  Temp: 98.7 °F (37.1 °C) (07/27/20 1949)  Pulse: 80 (07/27/20 1949)  Resp: 18 (07/27/20 1949)  BP: 119/64 (07/27/20 1949)  SpO2: 98 % (07/27/20 1949) Vital Signs (24h Range):  Temp:  [98 °F (36.7 °C)-98.8 °F (37.1 °C)] 98.7 °F (37.1 °C)  Pulse:  [79-86] 80  Resp:  [12-20] 18  SpO2:  [96 %-98 %] 98 %  BP: (114-129)/(59-78) 119/64     Intake/Output - Last 3 Shifts       07/25 0700 - 07/26 0659 07/26 0700 - 07/27 0659 07/27 0700 - 07/28 0659    P.O. 0 480 400    IV Piggyback  150 50    TPN 2352.5 506.3 1501.3    Total Intake(mL/kg) 2352.5 (33.3) 1136.3 (16.1) 1951.3 (27.6)    Urine (mL/kg/hr)  1500 (0.9) 500 (0.5)    Emesis/NG output  0     Other  0     Stool  0 0    Blood  0     Total Output  1500 500    Net +2352.5 -363.8 +1451.3           Urine Occurrence 8 x 4 x 1 x    Stool Occurrence 0 x 1 x 1 x    Emesis Occurrence  0 x           Physical Exam  Constitutional:       Appearance: She is well-developed. She is not ill-appearing.   HENT:      Head: Normocephalic.   Eyes:      Pupils: Pupils are equal, round, and reactive to light.   Cardiovascular:      Rate and Rhythm:  Normal rate and regular rhythm.      Heart sounds: Normal heart sounds.   Pulmonary:      Effort: Pulmonary effort is normal. No respiratory distress.      Breath sounds: Normal breath sounds. No wheezing or rales.   Abdominal:      Palpations: Abdomen is soft. There is no mass.      Tenderness: There is no guarding or rebound.   Skin:     General: Skin is warm and dry.   Neurological:      Mental Status: She is alert and oriented to person, place, and time.   Psychiatric:         Behavior: Behavior normal.         Thought Content: Thought content normal.         Judgment: Judgment normal.           Significant Labs:  BMP (Last 3 Results):   Recent Labs   Lab 07/25/20 0305 07/26/20 0428 07/27/20  0528   *  160* 170*  170*  170*  170* 137*  137*   *  134* 131*  131*  131*  131* 132*  132*   K 4.8  4.8 4.5  4.5  4.5  4.5 4.3  4.3     103 102  102  102  102 103  103   CO2 22*  22* 24  24  24  24 25  25   BUN 33*  33* 20  20  20  20 17  17   CREATININE 1.1  1.1 1.0  1.0  1.0  1.0 0.9  0.9   CALCIUM 8.9  8.9 8.5*  8.5*  8.5*  8.5* 8.2*  8.2*   MG 2.0 1.8  1.8  1.8  1.8  1.8 1.6     CBC (Last 3 Results):   Recent Labs   Lab 07/25/20 0305 07/26/20 0428 07/27/20  0528   WBC 8.24 5.73 6.85   RBC 3.98* 3.56* 3.38*   HGB 9.2* 8.3* 8.0*   HCT 30.6* 27.3* 25.8*   * 293 297   MCV 77* 77* 76*   MCH 23.1* 23.3* 23.7*   MCHC 30.1* 30.4* 31.0*       Significant Diagnostics:  I have reviewed all pertinent imaging results/findings within the past 24 hours.

## 2020-07-28 NOTE — PROGRESS NOTES
[facility]  Colorectal Surgery  Progress Note    Patient Name: Azucena Morrison  MRN: [id number]  Admission Date: 7/15/2020  Hospital Length of Stay: 12 days  Attending Physician: Fabiana Valiente MD    Subjective:     Interval History: no acute overnight events. Denies pain or nausea. Tolerating liquids    Post-Op Info:  Procedure(s) (LRB):  RESECTION, COLON, LOW ANTERIOR, possible loop ileostomy, ERAS high (N/A)          Medications:  Continuous Infusions:   TPN ADULT CENTRAL LINE CUSTOM 75 mL/hr at 07/26/20 2219    TPN ADULT CENTRAL LINE CUSTOM       Scheduled Meds:   carvediloL  25 mg Oral Daily    enoxaparin  40 mg Subcutaneous Q24H    fat emulsion 20%  250 mL Intravenous Daily    sodium chloride 0.9%  10 mL Intravenous Q6H     PRN Meds:   benzocaine    dextrose 50%    dextrose 50%    glucagon (human recombinant)    glucagon (human recombinant)    HYDROmorphone    HYDROmorphone    insulin aspart U-100    ondansetron    promethazine (PHENERGAN) IVPB    sodium chloride 0.9%        Objective:     Vital Signs (Most Recent):  Temp: 98.7 °F (37.1 °C) (07/27/20 1949)  Pulse: 80 (07/27/20 1949)  Resp: 18 (07/27/20 1949)  BP: 119/64 (07/27/20 1949)  SpO2: 98 % (07/27/20 1949) Vital Signs (24h Range):  Temp:  [98 °F (36.7 °C)-98.8 °F (37.1 °C)] 98.7 °F (37.1 °C)  Pulse:  [79-86] 80  Resp:  [12-20] 18  SpO2:  [96 %-98 %] 98 %  BP: (114-129)/(59-78) 119/64     Intake/Output - Last 3 Shifts       07/25 0700 - 07/26 0659 07/26 0700 - 07/27 0659 07/27 0700 - 07/28 0659    P.O. 0 480 400    IV Piggyback  150 50    TPN 2352.5 506.3 1501.3    Total Intake(mL/kg) 2352.5 (33.3) 1136.3 (16.1) 1951.3 (27.6)    Urine (mL/kg/hr)  1500 (0.9) 500 (0.5)    Emesis/NG output  0     Other  0     Stool  0 0    Blood  0     Total Output  1500 500    Net +2352.5 -363.8 +1451.3           Urine Occurrence 8 x 4 x 1 x    Stool Occurrence 0 x 1 x 1 x    Emesis Occurrence  0 x           Physical  Exam  Constitutional:       Appearance: She is well-developed. She is not ill-appearing.   HENT:      Head: Normocephalic.   Eyes:      Pupils: Pupils are equal, round, and reactive to light.   Cardiovascular:      Rate and Rhythm: Normal rate and regular rhythm.      Heart sounds: Normal heart sounds.   Pulmonary:      Effort: Pulmonary effort is normal. No respiratory distress.      Breath sounds: Normal breath sounds. No wheezing or rales.   Abdominal:      Palpations: Abdomen is soft. There is no mass.      Tenderness: There is no guarding or rebound.   Skin:     General: Skin is warm and dry.   Neurological:      Mental Status: She is alert and oriented to person, place, and time.   Psychiatric:         Behavior: Behavior normal.         Thought Content: Thought content normal.         Judgment: Judgment normal.           Significant Labs:  BMP (Last 3 Results):   Recent Labs   Lab 07/25/20 0305 07/26/20 0428 07/27/20  0528   *  160* 170*  170*  170*  170* 137*  137*   *  134* 131*  131*  131*  131* 132*  132*   K 4.8  4.8 4.5  4.5  4.5  4.5 4.3  4.3     103 102  102  102  102 103  103   CO2 22*  22* 24  24  24  24 25  25   BUN 33*  33* 20  20  20  20 17  17   CREATININE 1.1  1.1 1.0  1.0  1.0  1.0 0.9  0.9   CALCIUM 8.9  8.9 8.5*  8.5*  8.5*  8.5* 8.2*  8.2*   MG 2.0 1.8  1.8  1.8  1.8  1.8 1.6     CBC (Last 3 Results):   Recent Labs   Lab 07/25/20 0305 07/26/20 0428 07/27/20  0528   WBC 8.24 5.73 6.85   RBC 3.98* 3.56* 3.38*   HGB 9.2* 8.3* 8.0*   HCT 30.6* 27.3* 25.8*   * 293 297   MCV 77* 77* 76*   MCH 23.1* 23.3* 23.7*   MCHC 30.1* 30.4* 31.0*       Significant Diagnostics:  I have reviewed all pertinent imaging results/findings within the past 24 hours.    Assessment/Plan:     * Cutaneous fistula  Surgical intervention potentially planned for friday  Continue TPN  Continue to full liquids with boost  Pain control  IVF - replace  losses    Chronic kidney disease, stage 3  Monitor labs  Monitor I&O    Generalized abdominal pain  Continue IV pain meds  Begin to wean pain meds    Essential hypertension  Monitor BP  Home meds    Type 2 diabetes mellitus without complication, with long-term current use of insulin  SSI  Accu check Q6          Jose Fuentes MD  Colorectal Surgery  [facility]

## 2020-07-28 NOTE — PLAN OF CARE
Patient A/O x4. Patient is Dutch speaking but is able to understand and follow commands. Patient does speak some english. Patient c/o abdominal pain. Pain controlled with PRN Dilaudid q3. Mild intermittent nausea relieved with Anti-emetics. PICC flushed/patent with TPN running at 75 cc. Up to toilet independently. Abdominal wound present with foul odor, pt performs dressing changed independently. Still awaiting procedure scheduled for 7/31. Patient refuses SCD after patient teaching and encouragement. Call light placed in reach. No adverse s/s noted , pt left in comfort, Frequent rounding done. WCTM

## 2020-07-28 NOTE — PLAN OF CARE
Plan is for OR on Friday.      07/28/20 0957   Discharge Reassessment   Assessment Type Discharge Planning Reassessment   Discharge Plan A Home   Discharge Plan B Other  (TBD)   DME Needed Upon Discharge  none   Anticipated Discharge Disposition Home   Can the patient/caregiver answer the patient profile reliably? Yes, cognitively intact     Rosy Lynch RN, CM   Ext: [phone]

## 2020-07-28 NOTE — ASSESSMENT & PLAN NOTE
Surgical intervention potentially planned for friday  Continue TPN  Continue to full liquids with boost  Pain control  IVF - replace losses

## 2020-07-28 NOTE — PLAN OF CARE
POC reviewed with pt who verbalized understanding. VSS on RA - Senegalese speaking, used uriel. AAOX4. Remains free of falls and injury. Pt up stand by assist to toilet, encouraged ambulation.      - abdominal wounds, pt does dsg changes self, foul odor  - LT UA PICC   - frequent pain management  - procedure scheduled for 7/31  - started Golytely, educated to finish by thurs       TPN infusing. Tolerating full liquid diet, denies nausea. Pain controlled. Blood glucose being monitored. Patient denies chest pain & SOB. Refusing SCDs in place, states she is ambulating. No acute events. No distress noted. Bed in lowest position, call light within reach, frequent rounds made for safety.      WCTM.

## 2020-07-29 LAB
ALBUMIN SERPL BCP-MCNC: 2.4 G/DL (ref 3.5–5.2)
ALP SERPL-CCNC: 87 U/L (ref 55–135)
ALT SERPL W/O P-5'-P-CCNC: 17 U/L (ref 10–44)
ANION GAP SERPL CALC-SCNC: 5 MMOL/L (ref 8–16)
ANION GAP SERPL CALC-SCNC: 5 MMOL/L (ref 8–16)
AST SERPL-CCNC: 20 U/L (ref 10–40)
BASOPHILS # BLD AUTO: 0.03 K/UL (ref 0–0.2)
BASOPHILS NFR BLD: 0.4 % (ref 0–1.9)
BILIRUB SERPL-MCNC: 0.2 MG/DL (ref 0.1–1)
BUN SERPL-MCNC: 14 MG/DL (ref 8–23)
BUN SERPL-MCNC: 14 MG/DL (ref 8–23)
CALCIUM SERPL-MCNC: 8.4 MG/DL (ref 8.7–10.5)
CALCIUM SERPL-MCNC: 8.4 MG/DL (ref 8.7–10.5)
CHLORIDE SERPL-SCNC: 102 MMOL/L (ref 95–110)
CHLORIDE SERPL-SCNC: 102 MMOL/L (ref 95–110)
CO2 SERPL-SCNC: 28 MMOL/L (ref 23–29)
CO2 SERPL-SCNC: 28 MMOL/L (ref 23–29)
CREAT SERPL-MCNC: 1 MG/DL (ref 0.5–1.4)
CREAT SERPL-MCNC: 1 MG/DL (ref 0.5–1.4)
DIFFERENTIAL METHOD: ABNORMAL
EOSINOPHIL # BLD AUTO: 0.3 K/UL (ref 0–0.5)
EOSINOPHIL NFR BLD: 4.9 % (ref 0–8)
ERYTHROCYTE [DISTWIDTH] IN BLOOD BY AUTOMATED COUNT: 16.7 % (ref 11.5–14.5)
EST. GFR  (AFRICAN AMERICAN): >60 ML/MIN/1.73 M^2
EST. GFR  (AFRICAN AMERICAN): >60 ML/MIN/1.73 M^2
EST. GFR  (NON AFRICAN AMERICAN): 57.6 ML/MIN/1.73 M^2
EST. GFR  (NON AFRICAN AMERICAN): 57.6 ML/MIN/1.73 M^2
GLUCOSE SERPL-MCNC: 163 MG/DL (ref 70–110)
GLUCOSE SERPL-MCNC: 163 MG/DL (ref 70–110)
HCT VFR BLD AUTO: 25.9 % (ref 37–48.5)
HGB BLD-MCNC: 8 G/DL (ref 12–16)
IMM GRANULOCYTES # BLD AUTO: 0.01 K/UL (ref 0–0.04)
IMM GRANULOCYTES NFR BLD AUTO: 0.1 % (ref 0–0.5)
LYMPHOCYTES # BLD AUTO: 1.9 K/UL (ref 1–4.8)
LYMPHOCYTES NFR BLD: 28.1 % (ref 18–48)
MAGNESIUM SERPL-MCNC: 1.7 MG/DL (ref 1.6–2.6)
MCH RBC QN AUTO: 23.3 PG (ref 27–31)
MCHC RBC AUTO-ENTMCNC: 30.9 G/DL (ref 32–36)
MCV RBC AUTO: 76 FL (ref 82–98)
MONOCYTES # BLD AUTO: 0.6 K/UL (ref 0.3–1)
MONOCYTES NFR BLD: 8.1 % (ref 4–15)
NEUTROPHILS # BLD AUTO: 4 K/UL (ref 1.8–7.7)
NEUTROPHILS NFR BLD: 58.4 % (ref 38–73)
NRBC BLD-RTO: 0 /100 WBC
PHOSPHATE SERPL-MCNC: 3.2 MG/DL (ref 2.7–4.5)
PLATELET # BLD AUTO: 312 K/UL (ref 150–350)
PMV BLD AUTO: 8.8 FL (ref 9.2–12.9)
POCT GLUCOSE: 133 MG/DL (ref 70–110)
POCT GLUCOSE: 196 MG/DL (ref 70–110)
POCT GLUCOSE: 200 MG/DL (ref 70–110)
POCT GLUCOSE: 208 MG/DL (ref 70–110)
POTASSIUM SERPL-SCNC: 4.1 MMOL/L (ref 3.5–5.1)
POTASSIUM SERPL-SCNC: 4.1 MMOL/L (ref 3.5–5.1)
PROT SERPL-MCNC: 7.1 G/DL (ref 6–8.4)
RBC # BLD AUTO: 3.43 M/UL (ref 4–5.4)
SODIUM SERPL-SCNC: 135 MMOL/L (ref 136–145)
SODIUM SERPL-SCNC: 135 MMOL/L (ref 136–145)
WBC # BLD AUTO: 6.9 K/UL (ref 3.9–12.7)

## 2020-07-29 PROCEDURE — 85025 COMPLETE CBC W/AUTO DIFF WBC: CPT

## 2020-07-29 PROCEDURE — 25000003 PHARM REV CODE 250: Performed by: STUDENT IN AN ORGANIZED HEALTH CARE EDUCATION/TRAINING PROGRAM

## 2020-07-29 PROCEDURE — 97803 MED NUTRITION INDIV SUBSEQ: CPT

## 2020-07-29 PROCEDURE — A4216 STERILE WATER/SALINE, 10 ML: HCPCS | Performed by: COLON & RECTAL SURGERY

## 2020-07-29 PROCEDURE — 84100 ASSAY OF PHOSPHORUS: CPT

## 2020-07-29 PROCEDURE — 83735 ASSAY OF MAGNESIUM: CPT

## 2020-07-29 PROCEDURE — 63600175 PHARM REV CODE 636 W HCPCS: Performed by: SURGERY

## 2020-07-29 PROCEDURE — 63600175 PHARM REV CODE 636 W HCPCS: Performed by: STUDENT IN AN ORGANIZED HEALTH CARE EDUCATION/TRAINING PROGRAM

## 2020-07-29 PROCEDURE — B4185 PARENTERAL SOL 10 GM LIPIDS: HCPCS | Performed by: STUDENT IN AN ORGANIZED HEALTH CARE EDUCATION/TRAINING PROGRAM

## 2020-07-29 PROCEDURE — 25000003 PHARM REV CODE 250: Performed by: COLON & RECTAL SURGERY

## 2020-07-29 PROCEDURE — 80053 COMPREHEN METABOLIC PANEL: CPT

## 2020-07-29 PROCEDURE — 20600001 HC STEP DOWN PRIVATE ROOM

## 2020-07-29 PROCEDURE — A4217 STERILE WATER/SALINE, 500 ML: HCPCS | Performed by: STUDENT IN AN ORGANIZED HEALTH CARE EDUCATION/TRAINING PROGRAM

## 2020-07-29 PROCEDURE — 25000003 PHARM REV CODE 250: Performed by: SURGERY

## 2020-07-29 PROCEDURE — 63600175 PHARM REV CODE 636 W HCPCS: Performed by: NURSE PRACTITIONER

## 2020-07-29 RX ADMIN — ONDANSETRON 8 MG: 2 INJECTION INTRAMUSCULAR; INTRAVENOUS at 01:07

## 2020-07-29 RX ADMIN — INSULIN ASPART 2 UNITS: 100 INJECTION, SOLUTION INTRAVENOUS; SUBCUTANEOUS at 01:07

## 2020-07-29 RX ADMIN — HYDROMORPHONE HYDROCHLORIDE 0.5 MG: 1 INJECTION, SOLUTION INTRAMUSCULAR; INTRAVENOUS; SUBCUTANEOUS at 06:07

## 2020-07-29 RX ADMIN — Medication 10 ML: at 12:07

## 2020-07-29 RX ADMIN — HYDROMORPHONE HYDROCHLORIDE 0.5 MG: 1 INJECTION, SOLUTION INTRAMUSCULAR; INTRAVENOUS; SUBCUTANEOUS at 05:07

## 2020-07-29 RX ADMIN — HYDROMORPHONE HYDROCHLORIDE 0.5 MG: 1 INJECTION, SOLUTION INTRAMUSCULAR; INTRAVENOUS; SUBCUTANEOUS at 09:07

## 2020-07-29 RX ADMIN — OXYCODONE HYDROCHLORIDE 10 MG: 10 TABLET ORAL at 12:07

## 2020-07-29 RX ADMIN — INSULIN ASPART 4 UNITS: 100 INJECTION, SOLUTION INTRAVENOUS; SUBCUTANEOUS at 06:07

## 2020-07-29 RX ADMIN — HYDROMORPHONE HYDROCHLORIDE 0.5 MG: 1 INJECTION, SOLUTION INTRAMUSCULAR; INTRAVENOUS; SUBCUTANEOUS at 02:07

## 2020-07-29 RX ADMIN — Medication 10 ML: at 06:07

## 2020-07-29 RX ADMIN — OXYCODONE HYDROCHLORIDE 10 MG: 10 TABLET ORAL at 08:07

## 2020-07-29 RX ADMIN — PROMETHAZINE HYDROCHLORIDE 6.25 MG: 25 INJECTION INTRAMUSCULAR; INTRAVENOUS at 08:07

## 2020-07-29 RX ADMIN — CARVEDILOL 25 MG: 25 TABLET, FILM COATED ORAL at 08:07

## 2020-07-29 RX ADMIN — ENOXAPARIN SODIUM 40 MG: 100 INJECTION SUBCUTANEOUS at 06:07

## 2020-07-29 RX ADMIN — INSULIN ASPART 4 UNITS: 100 INJECTION, SOLUTION INTRAVENOUS; SUBCUTANEOUS at 08:07

## 2020-07-29 RX ADMIN — ONDANSETRON 8 MG: 2 INJECTION INTRAMUSCULAR; INTRAVENOUS at 02:07

## 2020-07-29 RX ADMIN — HYDROMORPHONE HYDROCHLORIDE 0.5 MG: 1 INJECTION, SOLUTION INTRAMUSCULAR; INTRAVENOUS; SUBCUTANEOUS at 01:07

## 2020-07-29 RX ADMIN — MAGNESIUM SULFATE HEPTAHYDRATE: 500 INJECTION, SOLUTION INTRAMUSCULAR; INTRAVENOUS at 09:07

## 2020-07-29 RX ADMIN — I.V. FAT EMULSION 250 ML: 20 EMULSION INTRAVENOUS at 09:07

## 2020-07-29 NOTE — PROGRESS NOTES
[facility]  Colorectal Surgery  Progress Note    Patient Name: Azucena Morrison  MRN: [id number]  Admission Date: 7/15/2020  Hospital Length of Stay: 14 days  Attending Physician: Fabiana Valiente MD    Subjective:     Interval History:   No acute events overnight  Pain controlled  Denies nausea or vomiting with slow drinking of the prep  Did have BM yesterday    Post-Op Info:  Procedure(s) (LRB):  RESECTION, COLON, LOW ANTERIOR, possible loop ileostomy, ERAS high (N/A)          Medications:  Continuous Infusions:   TPN ADULT CENTRAL LINE CUSTOM 75 mL/hr at 07/28/20 2114    TPN ADULT CENTRAL LINE CUSTOM       Scheduled Meds:   carvediloL  25 mg Oral Daily    enoxaparin  40 mg Subcutaneous Q24H    fat emulsion 20%  250 mL Intravenous Daily    sodium chloride 0.9%  10 mL Intravenous Q6H     PRN Meds:   benzocaine    dextrose 50%    dextrose 50%    glucagon (human recombinant)    glucagon (human recombinant)    HYDROmorphone    insulin aspart U-100    ondansetron    oxyCODONE    oxyCODONE    promethazine (PHENERGAN) IVPB    sodium chloride 0.9%        Objective:     Vital Signs (Most Recent):  Temp: 98.1 °F (36.7 °C) (07/29/20 0802)  Pulse: 81 (07/29/20 0802)  Resp: 18 (07/29/20 0806)  BP: 123/68 (07/29/20 0802)  SpO2: 96 % (07/29/20 0802) Vital Signs (24h Range):  Temp:  [97.4 °F (36.3 °C)-98.4 °F (36.9 °C)] 98.1 °F (36.7 °C)  Pulse:  [79-90] 81  Resp:  [14-20] 18  SpO2:  [95 %-99 %] 96 %  BP: (115-140)/(64-74) 123/68     Intake/Output - Last 3 Shifts       07/27 0700 - 07/28 0659 07/28 0700 - 07/29 0659 07/29 0700 - 07/30 0659    P.O. 400 950     IV Piggyback 50      TPN 1501.3 1782.5     Total Intake(mL/kg) 1951.3 (27.6) 2732.5 (38.6)     Urine (mL/kg/hr) 500 (0.3) 975 (0.6)     Emesis/NG output  0     Other  0     Stool 0 0     Blood  0     Total Output 500 975     Net +1451.3 +1757.5            Urine Occurrence 1 x 3 x     Stool Occurrence 1 x 1 x     Emesis Occurrence  0 x            Physical Exam  Constitutional:       Appearance: She is well-developed.  HENT:      Head: Normocephalic.     Cardiovascular:      Rate and Rhythm: Normal rate  Pulmonary:      Effort: Pulmonary effort is normal. No respiratory distress.     Abdominal:      Palpations: Abdomen is soft. There is no mass.      Tenderness: There is no guarding or rebound.   Skin:     General: Skin is warm and dry.   Neurological:      Mental Status: She is alert and oriented to person, place, and time.   Psychiatric:         Behavior: Behavior normal.         Thought Content: Thought content normal.         Judgment: Judgment normal.           Significant Labs:  BMP (Last 3 Results):   Recent Labs   Lab 07/27/20 0528 07/28/20  0400 07/29/20  0500   *  137* 143*  143* 163*  163*   *  132* 135*  135* 135*  135*   K 4.3  4.3 4.1  4.1 4.1  4.1     103 104  104 102  102   CO2 25  25 22*  22* 28  28   BUN 17  17 13  13 14  14   CREATININE 0.9  0.9 1.0  1.0 1.0  1.0   CALCIUM 8.2*  8.2* 8.6*  8.6* 8.4*  8.4*   MG 1.6 1.7 1.7     CBC (Last 3 Results):   Recent Labs   Lab 07/27/20 0528 07/28/20  0400 07/29/20  0500   WBC 6.85 6.41 6.90   RBC 3.38* 3.57* 3.43*   HGB 8.0* 8.4* 8.0*   HCT 25.8* 27.1* 25.9*    295 312   MCV 76* 76* 76*   MCH 23.7* 23.5* 23.3*   MCHC 31.0* 31.0* 30.9*       Significant Diagnostics:  I have reviewed all pertinent imaging results/findings within the past 24 hours.    Assessment/Plan:     * Cutaneous fistula  Surgical intervention planned for friday  Continue TPN  Continue to full liquids with boost  Slow prep with GoLytely  Mannie for potential ostomies bilaterally  Plan for ureteral stent placement on Friday  Pain control    Chronic kidney disease, stage 3  Monitor labs  Monitor I&O    Generalized abdominal pain  Continue IV pain meds  Begin to wean pain meds    Essential hypertension  Monitor BP  Home meds    Type 2 diabetes mellitus without complication,  with long-term current use of insulin  SSI  Accu check Q6          Shyam Aguila MD  Colorectal Surgery  [facility]

## 2020-07-29 NOTE — PROGRESS NOTES
"[facility]  Adult Nutrition  Progress Note    SUMMARY       Recommendations    Pt meets criteria for acute moderate malnutrition.      1. Current TPN exceeding EEN.               Recommend Custom TPN 90 gm AA / 215 gm Dex + IV lipids to provide 1591 kcal, 90 gm protein, and GIR of 2.11.      2. As medically able, ADAT to diabetic diet texture per SLP.    - Continue Boost Plus and Boost Breeze.      3.Suggest adding Vitamin C, MVI, and zinc to aid in wound healing.      4. RD following.     Goals: pt to tolerate >85% of EEN/EPN by RD follow up  Nutrition Goal Status: goal met  Communication of RD Recs: (POC)    Reason for Assessment    Reason For Assessment: RD follow-up  Diagnosis: (cutaneous fistula)  Relevant Medical History: DM; HTN; CKD3  Interdisciplinary Rounds: did not attend  General Information Comments: OR Friday for colon resection, possible ileostomy. Pt resting in bed with no family members at bedside. Pt reports good appetite, but per chart review eating 25-50% of meals. Pt enjoys foods and Boost Plus/Breeze. Denies N/V. NFPE completed 7/17. Pt with mild-moderate muscle/fat wasting. Pt meets criteria for moderate malnutrition.   Nutrition Discharge Planning: adequate po intake    Nutrition Risk Screen    Nutrition Risk Screen: tube feeding or parenteral nutrition    Nutrition/Diet History    Food Allergies: NKFA  Factors Affecting Nutritional Intake: decreased appetite(full liquid)    Anthropometrics    Temp: 98.1 °F (36.7 °C)  Height Method: Stated  Height: 5' 1" (154.9 cm)  Height (inches): 61 in  Weight Method: Bed Scale  Weight: 70.7 kg (155 lb 13.8 oz)  Weight (lb): 155.87 lb  Ideal Body Weight (IBW), Female: 105 lb  % Ideal Body Weight, Female (lb): 148.45 %  BMI (Calculated): 29.5  BMI Grade: 25 - 29.9 - overweight     Lab/Procedures/Meds    Pertinent Labs Reviewed: reviewed  Pertinent Labs Comments: Na 135, GFR 57.6  Pertinent Medications Reviewed: reviewed  Pertinent " Medications Comments: carvedilol    Estimated/Assessed Needs    Weight Used For Calorie Calculations: 70.7 kg (155 lb 13.8 oz)  Energy Calorie Requirements (kcal): 1461 kcal/day  Energy Need Method: [place]-St Jeor(x 1.25)  Protein Requirements: 85-99 g/day  Weight Used For Protein Calculations: 70.7 kg (155 lb 13.8 oz)  Fluid Requirements (mL): 1 mL/kcal or per MD     RDA Method (mL): 1461  CHO Requirement: 175    Nutrition Prescription Ordered    Current Diet Order: Full Liquid  Current Nutrition Support Formula Ordered: (Custom TPN 90 gm AA / 270 gm Dex + IV lipids)  Current Nutrition Support Rate Ordered: 75 (ml)  Current Nutrition Support Frequency Ordered: mL/hr  Oral Nutrition Supplement: Boost Plus, Boost Breeze    Evaluation of Received Nutrient/Fluid Intake    Parenteral Calories (kcal): 1278  Parenteral Protein (gm): 90  Parenteral Fluid (mL): 1800  Lipid Calories (kcals): 500 kcals  GIR (Glucose Infusion Rate) (mg/kg/min): 2.65 mg/kg/min  Total Calories (kcal): 1778  % Kcal Needs: 122  % Protein Needs: 100  Energy Calories Required: exceeds needs  Protein Required: meeting needs  Fluid Required: (per MD)  Comments: LBM 7/28  Tolerance: tolerating  % Intake of Estimated Energy Needs: 75 - 100 %  % Meal Intake: 50 - 75 % of full liquid    Nutrition Risk    Level of Risk/Frequency of Follow-up: (f/u 1 x wk)     Assessment and Plan    Nutrition Problem:  Moderate Protein-Calorie Malnutrition  Malnutrition in the context of Acute Illness/Injury     Related to (etiology):  Decreased intake      Signs and Symptoms (as evidenced by):  Energy Intake: <75% of estimated energy requirement for >1 week  Body Fat Depletion: mild depletion of triceps   Muscle Mass Depletion: mild and moderate depletion of temples, clavicle region, interosseous muscle and lower extremities   Weight Loss: 6% x unsure of time frame per pt      Interventions (treatment strategy):  Collaboration of care with other providers  Parenteral  Nutrition  Commercial Beverage - Boost Plus, Boost Breeze      Nutrition Diagnosis Status:  Improving      Monitor and Evaluation    Food and Nutrient Intake: energy intake, food and beverage intake, parenteral nutrition intake  Food and Nutrient Adminstration: diet order, enteral and parenteral nutrition administration  Physical Activity and Function: nutrition-related ADLs and IADLs  Anthropometric Measurements: weight, weight change  Biochemical Data, Medical Tests and Procedures: electrolyte and renal panel, gastrointestinal profile, glucose/endocrine profile, lipid profile, inflammatory profile  Nutrition-Focused Physical Findings: overall appearance     Malnutrition Assessment  Malnutrition Type: acute illness or injury          Weight Loss (Malnutrition): (6% unsure of time frame)  Energy Intake (Malnutrition): less than 75% for greater than 7 days  Subcutaneous Fat (Malnutrition): moderate depletion  Muscle Mass (Malnutrition): moderate depletion   Orbital Region (Subcutaneous Fat Loss): mild depletion  Upper Arm Region (Subcutaneous Fat Loss): moderate depletion   Orthodoxy Region (Muscle Loss): mild depletion  Clavicle Bone Region (Muscle Loss): mild depletion  Clavicle and Acromion Bone Region (Muscle Loss): mild depletion  Dorsal Hand (Muscle Loss): mild depletion  Anterior Thigh Region (Muscle Loss): moderate depletion  Posterior Calf Region (Muscle Loss): moderate depletion   Edema (Fluid Accumulation): 0-->no edema present             Nutrition Follow-Up    RD Follow-up?: Yes

## 2020-07-29 NOTE — CONSULTS
[facility]  Urology  Consult Note    Patient Name: Azucena Morrison  MRN: [id number]  Admission Date: 7/15/2020  Hospital Length of Stay: 14   Code Status: Prior   Attending Provider: Fabiana Valiente MD   Consulting Provider: Avi Webb MD  Primary Care Physician: Pj Sanches MD  Principal Problem:Cutaneous fistula    Consults    Subjective:     HPI:  Azucena Morrison is a 70 y.o. female with a history of DM II, HTN, CKD 3 and a complex past surgical history, which sounds like perforated diverticulitis treated with Aiyana procedure in [place] in , after which she developed enterocutaneous fistulae.  Status post colostomy reversal at [facility] in 2016.  Subsequently has developed new enterocutaneous fistulae and a complex matted area of coloenteric fistulae at the site of her sigmoid anastomosis.  Currently she is primarily symptomatic from what looks like a partial small-bowel obstruction associated with these matted loops. Colorectal is planning on taking patient to OR for LAR on Friday, 20. Urology was consulted for bilateral ureteral catheter placement pre-operatively.    Past Medical History:   Diagnosis Date    Chronic kidney disease, stage 3     Diabetes mellitus     Diabetes mellitus, type 2     Hypertension        Past Surgical History:   Procedure Laterality Date     SECTION, CLASSIC      x2    CHOLECYSTECTOMY      COL[facility] SURGERY      [facility]    COLONOSCOPY N/A 2018    Procedure: COLONOSCOPY;  Surgeon: Gibran Aguayo MD;  Location: [facility];  Service: Endoscopy;  Laterality: N/A;    COLOSTOMY Left     CYSTOSCOPY WITH URETEROSCOPY, RETROGRADE PYELOGRAPHY, AND INSERTION OF STENT Left 2019    Procedure: CYSTOSCOPY, WITH RETROGRADE PYELOGRAM AND URETERAL STENT INSERTION with placement of a muir cath;  Surgeon: Ulisses Horton MD;  Location: [facility] OR;  Service: General;  Laterality: Left;    INCISION AND DRAINAGE OF ABSCESS  N/A 2019    Procedure: INCISION AND DRAINAGE, ABSCESS;  Surgeon: Ulisses Horton MD;  Location: [facility] OR;  Service: General;  Laterality: N/A;    INCISION OF ABDOMINAL WALL N/A 8/10/2018    Procedure: INCISION, ABDOMINAL WALL;  Surgeon: Ulisses Horton MD;  Location: [facility] OR;  Service: General;  Laterality: N/A;    INCISIONAL HERNIA REPAIR Right 2010       Review of patient's allergies indicates:   Allergen Reactions    Chlorhexidine gluconate Rash     Chlorhexidine/alcohol wipes. Rash and blistering.       Family History     Problem Relation (Age of Onset)    Alcohol abuse Father    Diabetes Sister, Brother    Early death Grandchild    Heart disease Mother, Sister    Hyperlipidemia Mother, Sister    Hypertension Mother, Sister    Stroke Mother, Sister          Tobacco Use    Smoking status: Former Smoker     Types: Cigarettes     Quit date: 2000     Years since quittin.5    Smokeless tobacco: Never Used   Substance and Sexual Activity    Alcohol use: No    Drug use: No    Sexual activity: Not Currently       Review of Systems   Constitutional: Negative for chills and fever.   HENT: Negative.    Eyes: Negative.    Respiratory: Negative for chest tightness and shortness of breath.    Cardiovascular: Negative for chest pain and palpitations.   Gastrointestinal: Positive for abdominal pain. Negative for nausea and vomiting.   Genitourinary: Negative for decreased urine volume and flank pain.   Musculoskeletal: Negative for arthralgias and gait problem.   Skin: Positive for wound. Negative for color change and rash.   Neurological: Negative for dizziness and headaches.   Psychiatric/Behavioral: Negative for agitation and confusion.       Objective:     Temp:  [97.4 °F (36.3 °C)-98.4 °F (36.9 °C)] 98.1 °F (36.7 °C)  Pulse:  [79-90] 81  Resp:  [14-20] 18  SpO2:  [95 %-99 %] 96 %  BP: (115-140)/(64-74) 123/68     Body mass index is 29.45 kg/m².           Drains     None                  Physical Exam   Nursing note and vitals reviewed.  Constitutional: She is oriented to person, place, and time.   HENT:   Head: Normocephalic and atraumatic.   Pulmonary/Chest: Effort normal. No respiratory distress.   Abdominal: Normal appearance. She exhibits no distension. There is no abdominal tenderness.   Neurological: She is alert and oriented to person, place, and time.   Skin: Skin is warm and dry.     Psychiatric: Her behavior is normal. Mood, judgment and thought content normal.       Significant Labs:    BMP:  Recent Labs   Lab 07/27/20  0528 07/28/20  0400 07/29/20  0500   *  132* 135*  135* 135*  135*   K 4.3  4.3 4.1  4.1 4.1  4.1     103 104  104 102  102   CO2 25  25 22*  22* 28  28   BUN 17  17 13  13 14  14   CREATININE 0.9  0.9 1.0  1.0 1.0  1.0   CALCIUM 8.2*  8.2* 8.6*  8.6* 8.4*  8.4*       CBC:  Recent Labs   Lab 07/27/20  0528 07/28/20  0400 07/29/20  0500   WBC 6.85 6.41 6.90   HGB 8.0* 8.4* 8.0*   HCT 25.8* 27.1* 25.9*    295 312       All pertinent labs results from the past 24 hours have been reviewed.    Significant Imaging:  All pertinent imaging results from the past 24 hours have been reviewed.      Assessment and Plan:     * Cutaneous fistula  Azucena Morrison is a 70 y.o. female with a history of DM II, HTN, CKD 3 and a complex past surgical history with development of enterocutaneous and colocutaneous fistulae. Colorectal is planning on taking patient to OR for LAR on Friday, 7/31/20. Urology was consulted for bilateral ureteral catheter placement pre-operatively.    - Will be available for cystoscopy with bilateral ureteral catheter placement prior to colorectal surgery's portion of the case.  - Consent signed with the aid of language line .  - Please call with questions.        VTE Risk Mitigation (From admission, onward)         Ordered     enoxaparin injection 40 mg  Every 24 hours      07/15/20 9509     Place  sequential compression device  Until discontinued      07/15/20 1662                Thank you for your consult. I will follow-up with patient. Please contact us if you have any additional questions.    Avi Webb MD  Urology  [facility]

## 2020-07-29 NOTE — PLAN OF CARE
Problem: Parenteral Nutrition  Goal: Effective Intravenous Nutrition Therapy Delivery  Outcome: Ongoing, Progressing     Problem: Malnutrition  Goal: Improved Nutritional Intake  Outcome: Ongoing, Progressing         Recommendations    Pt meets criteria for acute moderate malnutrition.      1. Current TPN exceeding EEN.               Recommend Custom TPN 90 gm AA / 215 gm Dex + IV lipids to provide 1591 kcal, 90 gm protein, and GIR of 2.11.      2. As medically able, ADAT to diabetic diet texture per SLP.    - Continue Boost Plus and Boost Breeze.      3.Suggest adding Vitamin C, MVI, and zinc to aid in wound healing.      4. RD following.     Goals: pt to tolerate >85% of EEN/EPN by RD follow up  Nutrition Goal Status: goal met

## 2020-07-29 NOTE — PLAN OF CARE
Pt AAOX4. Turkish speaking but easy to communicate with. Pain managed with around the clock pain meds. Full liquids + GoLytley. PRN nausea meds given. Ambulates. Adequate urine output overnight. TPN infusing overnight. VS stable on room air. Pt slept between care. Bed low and locked, call bell within reach. Will continue to monitor.

## 2020-07-29 NOTE — ASSESSMENT & PLAN NOTE
Surgical intervention planned for friday  Continue TPN  Continue to full liquids with boost  Slow prep with GoLytely  Mannie for potential ostomies bilaterally  Plan for ureteral stent placement on Friday  Pain control

## 2020-07-29 NOTE — SUBJECTIVE & OBJECTIVE
Subjective:     Interval History:   No acute events overnight  Pain controlled  Denies nausea or vomiting with slow drinking of the prep  Did have BM yesterday    Post-Op Info:  Procedure(s) (LRB):  RESECTION, COLON, LOW ANTERIOR, possible loop ileostomy, ERAS high (N/A)          Medications:  Continuous Infusions:   TPN ADULT CENTRAL LINE CUSTOM 75 mL/hr at 07/28/20 2114    TPN ADULT CENTRAL LINE CUSTOM       Scheduled Meds:   carvediloL  25 mg Oral Daily    enoxaparin  40 mg Subcutaneous Q24H    fat emulsion 20%  250 mL Intravenous Daily    sodium chloride 0.9%  10 mL Intravenous Q6H     PRN Meds:   benzocaine    dextrose 50%    dextrose 50%    glucagon (human recombinant)    glucagon (human recombinant)    HYDROmorphone    insulin aspart U-100    ondansetron    oxyCODONE    oxyCODONE    promethazine (PHENERGAN) IVPB    sodium chloride 0.9%        Objective:     Vital Signs (Most Recent):  Temp: 98.1 °F (36.7 °C) (07/29/20 0802)  Pulse: 81 (07/29/20 0802)  Resp: 18 (07/29/20 0806)  BP: 123/68 (07/29/20 0802)  SpO2: 96 % (07/29/20 0802) Vital Signs (24h Range):  Temp:  [97.4 °F (36.3 °C)-98.4 °F (36.9 °C)] 98.1 °F (36.7 °C)  Pulse:  [79-90] 81  Resp:  [14-20] 18  SpO2:  [95 %-99 %] 96 %  BP: (115-140)/(64-74) 123/68     Intake/Output - Last 3 Shifts       07/27 0700 - 07/28 0659 07/28 0700 - 07/29 0659 07/29 0700 - 07/30 0659    P.O. 400 950     IV Piggyback 50      TPN 1501.3 1782.5     Total Intake(mL/kg) 1951.3 (27.6) 2732.5 (38.6)     Urine (mL/kg/hr) 500 (0.3) 975 (0.6)     Emesis/NG output  0     Other  0     Stool 0 0     Blood  0     Total Output 500 975     Net +1451.3 +1757.5            Urine Occurrence 1 x 3 x     Stool Occurrence 1 x 1 x     Emesis Occurrence  0 x           Physical Exam  Constitutional:       Appearance: She is well-developed.  HENT:      Head: Normocephalic.     Cardiovascular:      Rate and Rhythm: Normal rate  Pulmonary:      Effort: Pulmonary effort is normal.  No respiratory distress.     Abdominal:      Palpations: Abdomen is soft. There is no mass.      Tenderness: There is no guarding or rebound.   Skin:     General: Skin is warm and dry.   Neurological:      Mental Status: She is alert and oriented to person, place, and time.   Psychiatric:         Behavior: Behavior normal.         Thought Content: Thought content normal.         Judgment: Judgment normal.           Significant Labs:  BMP (Last 3 Results):   Recent Labs   Lab 07/27/20 0528 07/28/20  0400 07/29/20  0500   *  137* 143*  143* 163*  163*   *  132* 135*  135* 135*  135*   K 4.3  4.3 4.1  4.1 4.1  4.1     103 104  104 102  102   CO2 25  25 22*  22* 28  28   BUN 17  17 13  13 14  14   CREATININE 0.9  0.9 1.0  1.0 1.0  1.0   CALCIUM 8.2*  8.2* 8.6*  8.6* 8.4*  8.4*   MG 1.6 1.7 1.7     CBC (Last 3 Results):   Recent Labs   Lab 07/27/20 0528 07/28/20  0400 07/29/20  0500   WBC 6.85 6.41 6.90   RBC 3.38* 3.57* 3.43*   HGB 8.0* 8.4* 8.0*   HCT 25.8* 27.1* 25.9*    295 312   MCV 76* 76* 76*   MCH 23.7* 23.5* 23.3*   MCHC 31.0* 31.0* 30.9*       Significant Diagnostics:  I have reviewed all pertinent imaging results/findings within the past 24 hours.

## 2020-07-29 NOTE — CONSULTS
Consult received per MD to alejandro pt for possible colostomy and ileostomy.     Pt was admitted on 7/15/20 with cutaneous fistula. Pt has a PMHx of HTN, Diabetes Mellitus Type 2, and chronic kidney disease along with complex abdominal surgeries. Chart reflects that pt has a previous surgical history of a colon surgery done in [place] that resulted in a colectomy in 2015 and was seen at [facility] in 2015 for an attempted colostomy reversal. She has established care with [facility] and per chat review has had multiple problems with chronic wound infections, draining fistulas, nausea, and abdominal pain over the last 5 years.     Per Dr. Aguila with [facility] Rectal Surgery progress noted from today, a surgical intervention is planned for Friday, July 31 with potential for ostomies bilaterally with plan for ureteral stent placement of Friday.     Received pt awake and alert lying in bed. Pt reports that she is aware that he may be having surgery this upcoming Friday and verbalized an understanding of what a colostomy and ileostomy procedure involves.     Marked pt in both the left and right abdominal quadrant slightly below the umbilicus for a stoma site by locating  the rectus abdominis muscle with care to avoid abdominal folds and creases.     Also of note, dressing to fistula site to midline incision clean, dry and intact with mepore tape used to secure dressing.      Notified Dr. Aguila via secure chat that pt had been marked for potential colostomy and ileostomy procedure. Wound care to follow pt PRN. [id number]

## 2020-07-29 NOTE — SUBJECTIVE & OBJECTIVE
Past Medical History:   Diagnosis Date    Chronic kidney disease, stage 3     Diabetes mellitus     Diabetes mellitus, type 2     Hypertension        Past Surgical History:   Procedure Laterality Date     SECTION, CLASSIC      x2    CHOLECYSTECTOMY      [facility]ERY      [facility]    COLONOSCOPY N/A 2018    Procedure: COLONOSCOPY;  Surgeon: Gibran Aguayo MD;  Location: [facility] ENDO;  Service: Endoscopy;  Laterality: N/A;    COLOSTOMY Left     CYSTOSCOPY WITH URETEROSCOPY, RETROGRADE PYELOGRAPHY, AND INSERTION OF STENT Left 2019    Procedure: CYSTOSCOPY, WITH RETROGRADE PYELOGRAM AND URETERAL STENT INSERTION with placement of a muir cath;  Surgeon: Ulisses Horton MD;  Location: [facility] OR;  Service: General;  Laterality: Left;    INCISION AND DRAINAGE OF ABSCESS N/A 2019    Procedure: INCISION AND DRAINAGE, ABSCESS;  Surgeon: Ulisses Horton MD;  Location: [facility] OR;  Service: General;  Laterality: N/A;    INCISION OF ABDOMINAL WALL N/A 8/10/2018    Procedure: INCISION, ABDOMINAL WALL;  Surgeon: Ulisses Horton MD;  Location: [facility] OR;  Service: General;  Laterality: N/A;    INCISIONAL HERNIA REPAIR Right        Review of patient's allergies indicates:   Allergen Reactions    Chlorhexidine gluconate Rash     Chlorhexidine/alcohol wipes. Rash and blistering.       Family History     Problem Relation (Age of Onset)    Alcohol abuse Father    Diabetes Sister, Brother    Early death Grandchild    Heart disease Mother, Sister    Hyperlipidemia Mother, Sister    Hypertension Mother, Sister    Stroke Mother, Sister          Tobacco Use    Smoking status: Former Smoker     Types: Cigarettes     Quit date: 2000     Years since quittin.5    Smokeless tobacco: Never Used   Substance and Sexual Activity    Alcohol use: No    Drug use: No    Sexual activity: Not Currently       Review of Systems   Constitutional: Negative for chills and fever.   HENT:  Negative.    Eyes: Negative.    Respiratory: Negative for chest tightness and shortness of breath.    Cardiovascular: Negative for chest pain and palpitations.   Gastrointestinal: Positive for abdominal pain. Negative for nausea and vomiting.   Genitourinary: Negative for decreased urine volume and flank pain.   Musculoskeletal: Negative for arthralgias and gait problem.   Skin: Positive for wound. Negative for color change and rash.   Neurological: Negative for dizziness and headaches.   Psychiatric/Behavioral: Negative for agitation and confusion.       Objective:     Temp:  [97.4 °F (36.3 °C)-98.4 °F (36.9 °C)] 98.1 °F (36.7 °C)  Pulse:  [79-90] 81  Resp:  [14-20] 18  SpO2:  [95 %-99 %] 96 %  BP: (115-140)/(64-74) 123/68     Body mass index is 29.45 kg/m².           Drains     None                 Physical Exam   Nursing note and vitals reviewed.  Constitutional: She is oriented to person, place, and time.   HENT:   Head: Normocephalic and atraumatic.   Pulmonary/Chest: Effort normal. No respiratory distress.   Abdominal: Normal appearance. She exhibits no distension. There is no abdominal tenderness.   Neurological: She is alert and oriented to person, place, and time.   Skin: Skin is warm and dry.     Psychiatric: Her behavior is normal. Mood, judgment and thought content normal.       Significant Labs:    BMP:  Recent Labs   Lab 07/27/20 0528 07/28/20  0400 07/29/20  0500   *  132* 135*  135* 135*  135*   K 4.3  4.3 4.1  4.1 4.1  4.1     103 104  104 102  102   CO2 25  25 22*  22* 28  28   BUN 17  17 13  13 14  14   CREATININE 0.9  0.9 1.0  1.0 1.0  1.0   CALCIUM 8.2*  8.2* 8.6*  8.6* 8.4*  8.4*       CBC:  Recent Labs   Lab 07/27/20 0528 07/28/20  0400 07/29/20  0500   WBC 6.85 6.41 6.90   HGB 8.0* 8.4* 8.0*   HCT 25.8* 27.1* 25.9*    295 312       All pertinent labs results from the past 24 hours have been reviewed.    Significant Imaging:  All pertinent imaging  results from the past 24 hours have been reviewed.

## 2020-07-30 ENCOUNTER — ANESTHESIA EVENT (OUTPATIENT)
Dept: SURGERY | Facility: HOSPITAL | Age: 70
DRG: 329 | End: 2020-07-30
Payer: MEDICARE

## 2020-07-30 LAB
ALBUMIN SERPL BCP-MCNC: 2.5 G/DL (ref 3.5–5.2)
ALP SERPL-CCNC: 84 U/L (ref 55–135)
ALT SERPL W/O P-5'-P-CCNC: 16 U/L (ref 10–44)
ANION GAP SERPL CALC-SCNC: 8 MMOL/L (ref 8–16)
ANION GAP SERPL CALC-SCNC: 8 MMOL/L (ref 8–16)
AST SERPL-CCNC: 16 U/L (ref 10–40)
BASOPHILS # BLD AUTO: 0.03 K/UL (ref 0–0.2)
BASOPHILS NFR BLD: 0.4 % (ref 0–1.9)
BILIRUB SERPL-MCNC: 0.1 MG/DL (ref 0.1–1)
BUN SERPL-MCNC: 15 MG/DL (ref 8–23)
BUN SERPL-MCNC: 15 MG/DL (ref 8–23)
CALCIUM SERPL-MCNC: 9.1 MG/DL (ref 8.7–10.5)
CALCIUM SERPL-MCNC: 9.1 MG/DL (ref 8.7–10.5)
CHLORIDE SERPL-SCNC: 100 MMOL/L (ref 95–110)
CHLORIDE SERPL-SCNC: 100 MMOL/L (ref 95–110)
CO2 SERPL-SCNC: 28 MMOL/L (ref 23–29)
CO2 SERPL-SCNC: 28 MMOL/L (ref 23–29)
CREAT SERPL-MCNC: 1 MG/DL (ref 0.5–1.4)
CREAT SERPL-MCNC: 1 MG/DL (ref 0.5–1.4)
DIFFERENTIAL METHOD: ABNORMAL
EOSINOPHIL # BLD AUTO: 0.4 K/UL (ref 0–0.5)
EOSINOPHIL NFR BLD: 6.2 % (ref 0–8)
ERYTHROCYTE [DISTWIDTH] IN BLOOD BY AUTOMATED COUNT: 16.7 % (ref 11.5–14.5)
EST. GFR  (AFRICAN AMERICAN): >60 ML/MIN/1.73 M^2
EST. GFR  (AFRICAN AMERICAN): >60 ML/MIN/1.73 M^2
EST. GFR  (NON AFRICAN AMERICAN): 57.6 ML/MIN/1.73 M^2
EST. GFR  (NON AFRICAN AMERICAN): 57.6 ML/MIN/1.73 M^2
GLUCOSE SERPL-MCNC: 174 MG/DL (ref 70–110)
GLUCOSE SERPL-MCNC: 174 MG/DL (ref 70–110)
HCT VFR BLD AUTO: 26.8 % (ref 37–48.5)
HGB BLD-MCNC: 8.4 G/DL (ref 12–16)
IMM GRANULOCYTES # BLD AUTO: 0.03 K/UL (ref 0–0.04)
IMM GRANULOCYTES NFR BLD AUTO: 0.4 % (ref 0–0.5)
LYMPHOCYTES # BLD AUTO: 2.4 K/UL (ref 1–4.8)
LYMPHOCYTES NFR BLD: 34.8 % (ref 18–48)
MAGNESIUM SERPL-MCNC: 1.9 MG/DL (ref 1.6–2.6)
MCH RBC QN AUTO: 23.5 PG (ref 27–31)
MCHC RBC AUTO-ENTMCNC: 31.3 G/DL (ref 32–36)
MCV RBC AUTO: 75 FL (ref 82–98)
MONOCYTES # BLD AUTO: 0.6 K/UL (ref 0.3–1)
MONOCYTES NFR BLD: 9.3 % (ref 4–15)
NEUTROPHILS # BLD AUTO: 3.4 K/UL (ref 1.8–7.7)
NEUTROPHILS NFR BLD: 48.9 % (ref 38–73)
NRBC BLD-RTO: 0 /100 WBC
PHOSPHATE SERPL-MCNC: 3.3 MG/DL (ref 2.7–4.5)
PLATELET # BLD AUTO: 349 K/UL (ref 150–350)
PMV BLD AUTO: 8.3 FL (ref 9.2–12.9)
POCT GLUCOSE: 155 MG/DL (ref 70–110)
POCT GLUCOSE: 177 MG/DL (ref 70–110)
POCT GLUCOSE: 182 MG/DL (ref 70–110)
POCT GLUCOSE: 193 MG/DL (ref 70–110)
POCT GLUCOSE: 227 MG/DL (ref 70–110)
POTASSIUM SERPL-SCNC: 4.1 MMOL/L (ref 3.5–5.1)
POTASSIUM SERPL-SCNC: 4.1 MMOL/L (ref 3.5–5.1)
PROT SERPL-MCNC: 7.4 G/DL (ref 6–8.4)
RBC # BLD AUTO: 3.58 M/UL (ref 4–5.4)
SODIUM SERPL-SCNC: 136 MMOL/L (ref 136–145)
SODIUM SERPL-SCNC: 136 MMOL/L (ref 136–145)
TRIGL SERPL-MCNC: 354 MG/DL (ref 30–150)
WBC # BLD AUTO: 6.89 K/UL (ref 3.9–12.7)

## 2020-07-30 PROCEDURE — 63600175 PHARM REV CODE 636 W HCPCS: Performed by: SURGERY

## 2020-07-30 PROCEDURE — 25000003 PHARM REV CODE 250: Performed by: SURGERY

## 2020-07-30 PROCEDURE — A4216 STERILE WATER/SALINE, 10 ML: HCPCS | Performed by: COLON & RECTAL SURGERY

## 2020-07-30 PROCEDURE — 80053 COMPREHEN METABOLIC PANEL: CPT

## 2020-07-30 PROCEDURE — 25000003 PHARM REV CODE 250: Performed by: COLON & RECTAL SURGERY

## 2020-07-30 PROCEDURE — 84478 ASSAY OF TRIGLYCERIDES: CPT

## 2020-07-30 PROCEDURE — 83735 ASSAY OF MAGNESIUM: CPT

## 2020-07-30 PROCEDURE — 63600175 PHARM REV CODE 636 W HCPCS: Performed by: STUDENT IN AN ORGANIZED HEALTH CARE EDUCATION/TRAINING PROGRAM

## 2020-07-30 PROCEDURE — 20600001 HC STEP DOWN PRIVATE ROOM

## 2020-07-30 PROCEDURE — 25000003 PHARM REV CODE 250: Performed by: STUDENT IN AN ORGANIZED HEALTH CARE EDUCATION/TRAINING PROGRAM

## 2020-07-30 PROCEDURE — 84100 ASSAY OF PHOSPHORUS: CPT

## 2020-07-30 PROCEDURE — A4217 STERILE WATER/SALINE, 500 ML: HCPCS | Performed by: STUDENT IN AN ORGANIZED HEALTH CARE EDUCATION/TRAINING PROGRAM

## 2020-07-30 PROCEDURE — 85025 COMPLETE CBC W/AUTO DIFF WBC: CPT

## 2020-07-30 PROCEDURE — 63600175 PHARM REV CODE 636 W HCPCS: Performed by: NURSE PRACTITIONER

## 2020-07-30 RX ORDER — METRONIDAZOLE 500 MG/100ML
500 INJECTION, SOLUTION INTRAVENOUS
Status: DISCONTINUED | OUTPATIENT
Start: 2020-07-30 | End: 2020-07-31

## 2020-07-30 RX ADMIN — INSULIN ASPART 2 UNITS: 100 INJECTION, SOLUTION INTRAVENOUS; SUBCUTANEOUS at 12:07

## 2020-07-30 RX ADMIN — INSULIN ASPART 2 UNITS: 100 INJECTION, SOLUTION INTRAVENOUS; SUBCUTANEOUS at 05:07

## 2020-07-30 RX ADMIN — HYDROMORPHONE HYDROCHLORIDE 0.5 MG: 1 INJECTION, SOLUTION INTRAMUSCULAR; INTRAVENOUS; SUBCUTANEOUS at 12:07

## 2020-07-30 RX ADMIN — ONDANSETRON 8 MG: 2 INJECTION INTRAMUSCULAR; INTRAVENOUS at 09:07

## 2020-07-30 RX ADMIN — ONDANSETRON 8 MG: 2 INJECTION INTRAMUSCULAR; INTRAVENOUS at 12:07

## 2020-07-30 RX ADMIN — OXYCODONE HYDROCHLORIDE 10 MG: 10 TABLET ORAL at 09:07

## 2020-07-30 RX ADMIN — INSULIN ASPART 1 UNITS: 100 INJECTION, SOLUTION INTRAVENOUS; SUBCUTANEOUS at 09:07

## 2020-07-30 RX ADMIN — HYDROMORPHONE HYDROCHLORIDE 0.5 MG: 1 INJECTION, SOLUTION INTRAMUSCULAR; INTRAVENOUS; SUBCUTANEOUS at 08:07

## 2020-07-30 RX ADMIN — ENOXAPARIN SODIUM 40 MG: 100 INJECTION SUBCUTANEOUS at 06:07

## 2020-07-30 RX ADMIN — CARVEDILOL 25 MG: 25 TABLET, FILM COATED ORAL at 08:07

## 2020-07-30 RX ADMIN — INSULIN ASPART 2 UNITS: 100 INJECTION, SOLUTION INTRAVENOUS; SUBCUTANEOUS at 08:07

## 2020-07-30 RX ADMIN — MAGNESIUM SULFATE HEPTAHYDRATE: 500 INJECTION, SOLUTION INTRAMUSCULAR; INTRAVENOUS at 09:07

## 2020-07-30 RX ADMIN — OXYCODONE HYDROCHLORIDE 10 MG: 10 TABLET ORAL at 06:07

## 2020-07-30 RX ADMIN — OXYCODONE HYDROCHLORIDE 10 MG: 10 TABLET ORAL at 12:07

## 2020-07-30 RX ADMIN — Medication 10 ML: at 06:07

## 2020-07-30 RX ADMIN — HYDROMORPHONE HYDROCHLORIDE 0.5 MG: 1 INJECTION, SOLUTION INTRAMUSCULAR; INTRAVENOUS; SUBCUTANEOUS at 03:07

## 2020-07-30 RX ADMIN — HYDROMORPHONE HYDROCHLORIDE 0.5 MG: 1 INJECTION, SOLUTION INTRAMUSCULAR; INTRAVENOUS; SUBCUTANEOUS at 04:07

## 2020-07-30 RX ADMIN — Medication 10 ML: at 12:07

## 2020-07-30 RX ADMIN — HYDROMORPHONE HYDROCHLORIDE 0.5 MG: 1 INJECTION, SOLUTION INTRAMUSCULAR; INTRAVENOUS; SUBCUTANEOUS at 07:07

## 2020-07-30 RX ADMIN — ONDANSETRON 8 MG: 2 INJECTION INTRAMUSCULAR; INTRAVENOUS at 04:07

## 2020-07-30 RX ADMIN — PROMETHAZINE HYDROCHLORIDE 6.25 MG: 25 INJECTION INTRAMUSCULAR; INTRAVENOUS at 03:07

## 2020-07-30 NOTE — PROGRESS NOTES
[facility]  Colorectal Surgery  Progress Note    Patient Name: Azucena Morrison  MRN: [id number]  Admission Date: 7/15/2020  Hospital Length of Stay: 15 days  Attending Physician: Fabiana Valiente MD    Subjective:     Interval History:   Vomited yesterday evening; finished about 3/4 or Golytely; feels better this morning  Denies uncontrolled pain  Ambulating    Post-Op Info:  Procedure(s) (LRB):  RESECTION, COLON, LOW ANTERIOR, possible loop ileostomy, ERAS high (N/A)  CYSTOSCOPY (N/A)  CATHETERIZATION, URETER (Bilateral)          Medications:  Continuous Infusions:   TPN ADULT CENTRAL LINE CUSTOM 75 mL/hr at 07/29/20 2124    TPN ADULT CENTRAL LINE CUSTOM       Scheduled Meds:   carvediloL  25 mg Oral Daily    enoxaparin  40 mg Subcutaneous Q24H    sodium chloride 0.9%  10 mL Intravenous Q6H     PRN Meds:   benzocaine    dextrose 50%    dextrose 50%    glucagon (human recombinant)    glucagon (human recombinant)    HYDROmorphone    insulin aspart U-100    ondansetron    oxyCODONE    oxyCODONE    promethazine (PHENERGAN) IVPB    sodium chloride 0.9%        Objective:     Vital Signs (Most Recent):  Temp: 98.7 °F (37.1 °C) (07/30/20 1551)  Pulse: 83 (07/30/20 1551)  Resp: 18 (07/30/20 1815)  BP: (!) 152/83 (07/30/20 1551)  SpO2: 96 % (07/30/20 1551) Vital Signs (24h Range):  Temp:  [98.1 °F (36.7 °C)-98.7 °F (37.1 °C)] 98.7 °F (37.1 °C)  Pulse:  [82-84] 83  Resp:  [12-19] 18  SpO2:  [96 %-99 %] 96 %  BP: (122-153)/(67-84) 152/83     Intake/Output - Last 3 Shifts       07/28 0700 - 07/29 0659 07/29 0700 - 07/30 0659 07/30 0700 - 07/31 0659    P.O. 950      IV Piggyback       TPN 1782.5      Total Intake(mL/kg) 2732.5 (38.6)      Urine (mL/kg/hr) 975 (0.6)  600 (0.8)    Emesis/NG output 0 500     Other 0      Stool 0  0    Blood 0      Total Output 975 500 600    Net +1757.5 -500 -600           Urine Occurrence 3 x 2 x 1 x    Stool Occurrence 1 x  1 x    Emesis Occurrence 0 x             Physical Exam  Constitutional:       Appearance: She is well-developed.  HENT:      Head: Normocephalic.     Cardiovascular:      Rate and Rhythm: Normal rate  Pulmonary:      Effort: Pulmonary effort is normal. No respiratory distress.     Abdominal:      Palpations: Abdomen is soft. There is no mass.      Tenderness: There is no guarding or rebound.   Skin:     General: Skin is warm and dry.   Neurological:      Mental Status: She is alert and oriented to person, place, and time.   Psychiatric:         Behavior: Behavior normal.         Thought Content: Thought content normal.         Judgment: Judgment normal.           Significant Labs:  BMP (Last 3 Results):   Recent Labs   Lab 07/28/20  0400 07/29/20  0500 07/30/20  0400   *  143* 163*  163* 174*  174*   *  135* 135*  135* 136  136   K 4.1  4.1 4.1  4.1 4.1  4.1     104 102  102 100  100   CO2 22*  22* 28  28 28  28   BUN 13  13 14  14 15  15   CREATININE 1.0  1.0 1.0  1.0 1.0  1.0   CALCIUM 8.6*  8.6* 8.4*  8.4* 9.1  9.1   MG 1.7 1.7 1.9     CBC (Last 3 Results):   Recent Labs   Lab 07/28/20  0400 07/29/20  0500 07/30/20  0400   WBC 6.41 6.90 6.89   RBC 3.57* 3.43* 3.58*   HGB 8.4* 8.0* 8.4*   HCT 27.1* 25.9* 26.8*    312 349   MCV 76* 76* 75*   MCH 23.5* 23.3* 23.5*   MCHC 31.0* 30.9* 31.3*       Significant Diagnostics:  I have reviewed all pertinent imaging results/findings within the past 24 hours.      Assessment/Plan:     * Cutaneous fistula  NPO  Surgical intervention planned for friday  Continue TPN  Marked for potential ostomies bilaterally  Plan for ureteral stent placement with procedure  Consent  Type and screen    Chronic kidney disease, stage 3  Monitor labs  Monitor I&O    Generalized abdominal pain  Continue IV pain meds  Begin to wean pain meds    Essential hypertension  Monitor BP  Home meds    Type 2 diabetes mellitus without complication, with long-term current use of  insulin  SSI  Accu check Q6          Shyam Aguila MD  Colorectal Surgery  [facility]

## 2020-07-30 NOTE — NURSING
Patient alert and oriented.  Able to make needs known.  Patient comprehends her plan of care.  Medicine given for nausea and vomiting with some relief.  Pain meds given also with some relief.  Up and down out of the bed into the bathroom.  Wound intact with dressing.  Will continue to monitor.

## 2020-07-30 NOTE — SUBJECTIVE & OBJECTIVE
Subjective:     Interval History:   Vomited yesterday evening; finished about 3/4 or Golytely; feels better this morning  Denies uncontrolled pain  Ambulating    Post-Op Info:  Procedure(s) (LRB):  RESECTION, COLON, LOW ANTERIOR, possible loop ileostomy, ERAS high (N/A)  CYSTOSCOPY (N/A)  CATHETERIZATION, URETER (Bilateral)          Medications:  Continuous Infusions:   TPN ADULT CENTRAL LINE CUSTOM 75 mL/hr at 07/29/20 2124    TPN ADULT CENTRAL LINE CUSTOM       Scheduled Meds:   carvediloL  25 mg Oral Daily    enoxaparin  40 mg Subcutaneous Q24H    sodium chloride 0.9%  10 mL Intravenous Q6H     PRN Meds:   benzocaine    dextrose 50%    dextrose 50%    glucagon (human recombinant)    glucagon (human recombinant)    HYDROmorphone    insulin aspart U-100    ondansetron    oxyCODONE    oxyCODONE    promethazine (PHENERGAN) IVPB    sodium chloride 0.9%        Objective:     Vital Signs (Most Recent):  Temp: 98.7 °F (37.1 °C) (07/30/20 1551)  Pulse: 83 (07/30/20 1551)  Resp: 18 (07/30/20 1815)  BP: (!) 152/83 (07/30/20 1551)  SpO2: 96 % (07/30/20 1551) Vital Signs (24h Range):  Temp:  [98.1 °F (36.7 °C)-98.7 °F (37.1 °C)] 98.7 °F (37.1 °C)  Pulse:  [82-84] 83  Resp:  [12-19] 18  SpO2:  [96 %-99 %] 96 %  BP: (122-153)/(67-84) 152/83     Intake/Output - Last 3 Shifts       07/28 0700 - 07/29 0659 07/29 0700 - 07/30 0659 07/30 0700 - 07/31 0659    P.O. 950      IV Piggyback       TPN 1782.5      Total Intake(mL/kg) 2732.5 (38.6)      Urine (mL/kg/hr) 975 (0.6)  600 (0.8)    Emesis/NG output 0 500     Other 0      Stool 0  0    Blood 0      Total Output 975 500 600    Net +1757.5 -500 -600           Urine Occurrence 3 x 2 x 1 x    Stool Occurrence 1 x  1 x    Emesis Occurrence 0 x            Physical Exam  Constitutional:       Appearance: She is well-developed.  HENT:      Head: Normocephalic.     Cardiovascular:      Rate and Rhythm: Normal rate  Pulmonary:      Effort: Pulmonary effort is normal. No  respiratory distress.     Abdominal:      Palpations: Abdomen is soft. There is no mass.      Tenderness: There is no guarding or rebound.   Skin:     General: Skin is warm and dry.   Neurological:      Mental Status: She is alert and oriented to person, place, and time.   Psychiatric:         Behavior: Behavior normal.         Thought Content: Thought content normal.         Judgment: Judgment normal.           Significant Labs:  BMP (Last 3 Results):   Recent Labs   Lab 07/28/20  0400 07/29/20  0500 07/30/20  0400   *  143* 163*  163* 174*  174*   *  135* 135*  135* 136  136   K 4.1  4.1 4.1  4.1 4.1  4.1     104 102  102 100  100   CO2 22*  22* 28  28 28  28   BUN 13  13 14  14 15  15   CREATININE 1.0  1.0 1.0  1.0 1.0  1.0   CALCIUM 8.6*  8.6* 8.4*  8.4* 9.1  9.1   MG 1.7 1.7 1.9     CBC (Last 3 Results):   Recent Labs   Lab 07/28/20  0400 07/29/20  0500 07/30/20  0400   WBC 6.41 6.90 6.89   RBC 3.57* 3.43* 3.58*   HGB 8.4* 8.0* 8.4*   HCT 27.1* 25.9* 26.8*    312 349   MCV 76* 76* 75*   MCH 23.5* 23.3* 23.5*   MCHC 31.0* 30.9* 31.3*       Significant Diagnostics:  I have reviewed all pertinent imaging results/findings within the past 24 hours.

## 2020-07-30 NOTE — PLAN OF CARE
Pt AAOX4. Belizean speaking but easy to communicate with. Pain managed with around the clock pain meds. Full liquids + GoLytley. Emesis X1 at beginning of shift. PRN nausea meds given. Ambulates. Adequate urine output overnight. Fistula dressings intact. TPN infusing overnight. VS stable on room air. Pt slept between care. Bed low and locked, call bell within reach. Will continue to monitor.

## 2020-07-30 NOTE — ANESTHESIA PREPROCEDURE EVALUATION
[facility]  Anesthesia Pre-Operative Evaluation         Patient Name: Azucena Morrison  YOB: 1950  MRN: [id number]    SUBJECTIVE:     Pre-operative evaluation for Procedure(s) (LRB):  RESECTION, COLON, LOW ANTERIOR, possible loop ileostomy, ERAS high (N/A)  CYSTOSCOPY (N/A)  CATHETERIZATION, URETER (Bilateral)     07/30/2020    Azucena Morrison is a 70 y.o. female w/ a significant PMHx of HTN, T2DM, HTN, CKD 3 and a complex past surgical history, which sounds like perforated diverticulitis treated with Aiyana procedure in [place] in 2015, after which she developed enterocutaneous fistulae.  Status post colostomy reversal at [facility] in 2016.  Subsequently has developed new enterocutaneous fistulae and a complex matted area of coloenteric fistulae at the site of her sigmoid anastomosis. Currently she is primarily symptomatic from what looks like a partial small-bowel obstruction associated with these matted loops who now presents for the above procedure.      LDA: None documented.    Prev airway: Present Prior to Hospital Arrival?: No; Placement Date: 02/22/19; Placement Time: 0935; Method of Intubation: Direct laryngoscopy; Inserted by: CRNA; Airway Device: Endotracheal Tube; Mask Ventilation: Easy; Intubated: Postinduction; Blade: Suha #3; Airway Device Size: 7.0; Style: Cuffed; Cuff Inflation: Minimal occlusive pressure; Placement Verified By: Auscultation, Capnometry, ETT Condensation; Grade: Grade I; Complicating Factors: None; Intubation Findings: Positive EtCO2, Bilateral breath sounds, Atraumatic/Condition of teeth unchanged;  Depth of Insertion: 20; Securment: Lips; Complications: None; Breath Sounds: Equal Bilateral; Insertion Attempts: 1; Removal Date: 02/22/19;  Removal Time: 1153    Drips: None documented.    Patient Active Problem List   Diagnosis    History of hemicolectomy    Status post Aiyaan's procedure    Type 2 diabetes mellitus without complication,  with long-term current use of insulin    Suture granuloma    Essential hypertension    Drainage from wound    S/P colostomy    Fistula of intestine, excluding rectum and anus    S/P exploratory laparotomy    Tachycardia    Abscess of abdominal wall    Type 2 diabetes mellitus with skin complication    Unspecified local infection of skin and subcutaneous tissue    Diabetes with skin complication    Generalized abdominal pain    LLQ pain    Colonic fistula    Polyp of colon    Abdominal wall abscess    Infection due to ESBL-producing Escherichia coli    Nausea    Colocutaneous fistula    Partial obstruction of small intestine    Postprocedural intraabdominal abscess    SBO (small bowel obstruction)    Lactic acid increased    Hypokalemia due to excessive gastrointestinal loss of potassium    Chronic kidney disease, stage 3    Infection due to ESBL-producing Klebsiella pneumoniae    Cutaneous fistula       Review of patient's allergies indicates:   Allergen Reactions    Chlorhexidine gluconate Rash     Chlorhexidine/alcohol wipes. Rash and blistering.       Current Inpatient Medications:   carvediloL  25 mg Oral Daily    enoxaparin  40 mg Subcutaneous Q24H    sodium chloride 0.9%  10 mL Intravenous Q6H       No current facility-administered medications on file prior to encounter.      Current Outpatient Medications on File Prior to Encounter   Medication Sig Dispense Refill    acetaminophen (TYLENOL) 325 MG tablet Take 2 tablets (650 mg total) by mouth every 8 (eight) hours as needed.  0    betamethasone valerate 0.1% (VALISONE) 0.1 % Crea Apply around wound as needed daily for itching. 45 g 0    blood sugar diagnostic Strp Use to test blood sugar twice daily as directed. 100 each 0    blood-glucose meter Misc Use as directed to test blood sugar twice daily 1 each 0    carvedilol (COREG) 25 MG tablet Take 1 tablet (25 mg total) by mouth once daily. 30 tablet 1     "clotrimazole-betamethasone 1-0.05% (LOTRISONE) cream Apply topically locally as directed 15 g 0    dicyclomine (BENTYL) 10 MG capsule TAKE 1 CAPSULE BY MOUTH THREE TIMES A DAY 30 capsule 2    gentamicin (GARAMYCIN) 0.1 % ointment Apply topically to affected area daily 30 g 3    hydroCHLOROthiazide (HYDRODIURIL) 25 MG tablet Take 1 tablet (25 mg total) by mouth once daily. 30 tablet 0    HYDROcodone-acetaminophen (NORCO) 5-325 mg per tablet Take 1 tablet by mouth every 4 (four) hours as needed. 24 tablet 0    insulin glargine 100 units/mL (3mL) SubQ pen Inject subcutaneously into the skin 10 units every morning. 15 mL 3    lancets 30 gauge Misc Use to test blood sugar twice daily. 100 each 0    metFORMIN (GLUCOPHAGE) 1000 MG tablet Take 1 tablet (1,000 mg total) by mouth 2 (two) times daily. 180 tablet 3    metoclopramide HCl (REGLAN) 10 MG tablet Take 1 tablet (10 mg total) by mouth 4 (four) times daily. 120 tablet 1    naproxen (NAPROSYN) 500 MG tablet Take 1 tablet (500 mg total) by mouth 2 (two) times daily with meals. 60 tablet 1    ondansetron (ZOFRAN) 8 MG tablet Take 1 tablet (8 mg total) by mouth 2 (two) times daily as needed. (Patient taking differently: Take 8 mg by mouth every 8 (eight) hours as needed for Nausea. ) 40 tablet 0    pen needle, diabetic (BD ULTRA-FINE ONEIL PEN NEEDLE) 32 gauge x 5/32" Ndle Use to inject insulin daily 100 each 0    TRUETEST TEST STRIPS Strp          Past Surgical History:   Procedure Laterality Date     SECTION, CLASSIC      x2    CHOLECYSTECTOMY      [facility] SURGERY      [facility]    COLONOSCOPY N/A 2018    Procedure: COLONOSCOPY;  Surgeon: Gibran Aguayo MD;  Location: [facility];  Service: Endoscopy;  Laterality: N/A;    COLOSTOMY Left     CYSTOSCOPY WITH URETEROSCOPY, RETROGRADE PYELOGRAPHY, AND INSERTION OF STENT Left 2019    Procedure: CYSTOSCOPY, WITH RETROGRADE PYELOGRAM AND URETERAL STENT INSERTION with placement of a " muir cath;  Surgeon: Ulisses Horton MD;  Location: [facility] OR;  Service: General;  Laterality: Left;    INCISION AND DRAINAGE OF ABSCESS N/A 2019    Procedure: INCISION AND DRAINAGE, ABSCESS;  Surgeon: Ulisses Horton MD;  Location: [facility] OR;  Service: General;  Laterality: N/A;    INCISION OF ABDOMINAL WALL N/A 8/10/2018    Procedure: INCISION, ABDOMINAL WALL;  Surgeon: Ulisses Horton MD;  Location: [facility] OR;  Service: General;  Laterality: N/A;    INCISIONAL HERNIA REPAIR Right 2010       Social History     Socioeconomic History    Marital status: Single     Spouse name: Not on file    Number of children: Not on file    Years of education: Not on file    Highest education level: Not on file   Occupational History    Not on file   Social Needs    Financial resource strain: Not on file    Food insecurity     Worry: Not on file     Inability: Not on file    Transportation needs     Medical: Not on file     Non-medical: Not on file   Tobacco Use    Smoking status: Former Smoker     Types: Cigarettes     Quit date: 2000     Years since quittin.5    Smokeless tobacco: Never Used   Substance and Sexual Activity    Alcohol use: No    Drug use: No    Sexual activity: Not Currently   Lifestyle    Physical activity     Days per week: Not on file     Minutes per session: Not on file    Stress: Not on file   Relationships    Social connections     Talks on phone: Not on file     Gets together: Not on file     Attends Pent[employer]alism service: Not on file     Active member of club or organization: Not on file     Attends meetings of clubs or organizations: Not on file     Relationship status: Not on file   Other Topics Concern    Not on file   Social History Narrative    Not on file       OBJECTIVE:     Vital Signs Range (Last 24H):  Temp:  [36.7 °C (98.1 °F)-37.2 °C (98.9 °F)]   Pulse:  [80-84]   Resp:  [12-20]   BP: (120-153)/(61-81)   SpO2:  [97 %-99 %]       CBC:   Recent Labs      07/29/20  0500 07/30/20  0400   WBC 6.90 6.89   RBC 3.43* 3.58*   HGB 8.0* 8.4*   HCT 25.9* 26.8*    349   MCV 76* 75*   MCH 23.3* 23.5*   MCHC 30.9* 31.3*       CMP:   Recent Labs     07/29/20  0500 07/30/20  0400   *  135* 136  136   K 4.1  4.1 4.1  4.1     102 100  100   CO2 28  28 28  28   BUN 14  14 15  15   CREATININE 1.0  1.0 1.0  1.0   *  163* 174*  174*   MG 1.7 1.9   PHOS 3.2 3.3   CALCIUM 8.4*  8.4* 9.1  9.1   ALBUMIN 2.4* 2.5*   PROT 7.1 7.4   ALKPHOS 87 84   ALT 17 16   AST 20 16   BILITOT 0.2 0.1       INR:  No results for input(s): PT, INR, PROTIME, APTT in the last 72 hours.    Diagnostic Studies: No relevant studies.    EKG: No recent studies available.    2D ECHO:  Results for orders placed or performed during the hospital encounter of 11/14/16   2D echo with color flow doppler   Result Value Ref Range    QEF 60 55 - 65    Diastolic Dysfunction Yes (A)     Mitral Valve Mobility NORMAL     Tricuspid Valve Regurgitation TRIVIAL          ASSESSMENT/PLAN:       Pre-op Assessment    I have reviewed the Patient Summary Reports.     I have reviewed the Nursing Notes. I have reviewed the NPO Status.   I have reviewed the Medications.     Review of Systems  Anesthesia Hx:  No problems with previous Anesthesia Denies Hx of Anesthetic complications  Denies Family Hx of Anesthesia complications.   Denies Personal Hx of Anesthesia complications.   Social:  No Alcohol Use  Tobacco Use: , quit smoking >10 years ago   Hematology/Oncology:         -- Anemia:   Cardiovascular:   Hypertension Denies Dysrhythmias.   Denies Angina. ECG has been reviewed.    Pulmonary:  Pulmonary Normal  Denies Shortness of breath.    Renal/:   Chronic Renal Disease renal calculi    Hepatic/GI:  Hepatic/GI Normal  Denies GERD. Denies Liver Disease.    Neurological:  Neurology Normal Denies TIA.  Denies CVA. Denies Seizures.    Endocrine:   Diabetes  Diabetes, Type 2 Diabetes , controlled by  oral hypoglycemics, insulin. Typical AM glucose range: 170 , most recent HgA1c value was 7.9 on 8/2018.        Physical Exam  General:  Obesity    Airway/Jaw/Neck:  Airway Findings: Mouth Opening: Normal Tongue: Normal  General Airway Assessment: Adult  Mallampati: II  Jaw/Neck Findings:  Neck ROM: Normal ROM      Dental:  Dental Findings: Upper Dentures   Chest/Lungs:  Chest/Lungs Findings: Clear to auscultation, Normal Respiratory Rate     Heart/Vascular:  Heart Findings: Rate: Normal  Rhythm: Regular Rhythm  Sounds: Normal  Heart murmur: negative       Mental Status:  Mental Status Findings:  Cooperative, Alert and Oriented  Kiswahili speaking only         Anesthesia Plan  Type of Anesthesia, risks & benefits discussed:  Anesthesia Type:  general  Patient's Preference:   Intra-op Monitoring Plan: standard ASA monitors  Intra-op Monitoring Plan Comments:   Post Op Pain Control Plan: multimodal analgesia, IV/PO Opioids PRN and per primary service following discharge from PACU  Post Op Pain Control Plan Comments:   Induction:   IV  Beta Blocker:  Patient is on a Beta-Blocker and has received one dose within the past 24 hours (No further documentation required).       Informed Consent: Patient understands risks and agrees with Anesthesia plan.  Questions answered.   ASA Score: 3     Day of Surgery Review of History & Physical:    H&P update referred to the surgeon.     Anesthesia Plan Notes: High ERAS  NG pre-op if active SBO - no meds by mouth  Sux on induction - RSI. Have Macgrath 3 ready         Ready For Surgery From Anesthesia Perspective.

## 2020-07-30 NOTE — ASSESSMENT & PLAN NOTE
NPO  Surgical intervention planned for friday  Continue TPN  Marked for potential ostomies bilaterally  Plan for ureteral stent placement with procedure  Consent  Type and screen

## 2020-07-31 ENCOUNTER — ANESTHESIA (OUTPATIENT)
Dept: SURGERY | Facility: HOSPITAL | Age: 70
DRG: 329 | End: 2020-07-31
Payer: MEDICARE

## 2020-07-31 LAB
ABO + RH BLD: NORMAL
ALBUMIN SERPL BCP-MCNC: 2.5 G/DL (ref 3.5–5.2)
ALP SERPL-CCNC: 88 U/L (ref 55–135)
ALT SERPL W/O P-5'-P-CCNC: 15 U/L (ref 10–44)
ANION GAP SERPL CALC-SCNC: 6 MMOL/L (ref 8–16)
ANION GAP SERPL CALC-SCNC: 6 MMOL/L (ref 8–16)
ANION GAP SERPL CALC-SCNC: 8 MMOL/L (ref 8–16)
AST SERPL-CCNC: 17 U/L (ref 10–40)
BASOPHILS # BLD AUTO: 0.01 K/UL (ref 0–0.2)
BASOPHILS # BLD AUTO: 0.02 K/UL (ref 0–0.2)
BASOPHILS NFR BLD: 0.1 % (ref 0–1.9)
BASOPHILS NFR BLD: 0.3 % (ref 0–1.9)
BILIRUB SERPL-MCNC: 0.1 MG/DL (ref 0.1–1)
BLD GP AB SCN CELLS X3 SERPL QL: NORMAL
BUN SERPL-MCNC: 15 MG/DL (ref 8–23)
BUN SERPL-MCNC: 15 MG/DL (ref 8–23)
BUN SERPL-MCNC: 16 MG/DL (ref 8–23)
CALCIUM SERPL-MCNC: 7.8 MG/DL (ref 8.7–10.5)
CALCIUM SERPL-MCNC: 8.8 MG/DL (ref 8.7–10.5)
CALCIUM SERPL-MCNC: 8.8 MG/DL (ref 8.7–10.5)
CHLORIDE SERPL-SCNC: 100 MMOL/L (ref 95–110)
CHLORIDE SERPL-SCNC: 100 MMOL/L (ref 95–110)
CHLORIDE SERPL-SCNC: 107 MMOL/L (ref 95–110)
CO2 SERPL-SCNC: 21 MMOL/L (ref 23–29)
CO2 SERPL-SCNC: 29 MMOL/L (ref 23–29)
CO2 SERPL-SCNC: 29 MMOL/L (ref 23–29)
CREAT SERPL-MCNC: 0.8 MG/DL (ref 0.5–1.4)
CREAT SERPL-MCNC: 0.9 MG/DL (ref 0.5–1.4)
CREAT SERPL-MCNC: 0.9 MG/DL (ref 0.5–1.4)
DIFFERENTIAL METHOD: ABNORMAL
DIFFERENTIAL METHOD: ABNORMAL
EOSINOPHIL # BLD AUTO: 0 K/UL (ref 0–0.5)
EOSINOPHIL # BLD AUTO: 0.4 K/UL (ref 0–0.5)
EOSINOPHIL NFR BLD: 0 % (ref 0–8)
EOSINOPHIL NFR BLD: 6.2 % (ref 0–8)
ERYTHROCYTE [DISTWIDTH] IN BLOOD BY AUTOMATED COUNT: 16.4 % (ref 11.5–14.5)
ERYTHROCYTE [DISTWIDTH] IN BLOOD BY AUTOMATED COUNT: 16.5 % (ref 11.5–14.5)
EST. GFR  (AFRICAN AMERICAN): >60 ML/MIN/1.73 M^2
EST. GFR  (NON AFRICAN AMERICAN): >60 ML/MIN/1.73 M^2
GLUCOSE SERPL-MCNC: 152 MG/DL (ref 70–110)
GLUCOSE SERPL-MCNC: 152 MG/DL (ref 70–110)
GLUCOSE SERPL-MCNC: 190 MG/DL (ref 70–110)
HCT VFR BLD AUTO: 26.8 % (ref 37–48.5)
HCT VFR BLD AUTO: 27.5 % (ref 37–48.5)
HGB BLD-MCNC: 8.3 G/DL (ref 12–16)
HGB BLD-MCNC: 8.5 G/DL (ref 12–16)
IMM GRANULOCYTES # BLD AUTO: 0.02 K/UL (ref 0–0.04)
IMM GRANULOCYTES # BLD AUTO: 0.04 K/UL (ref 0–0.04)
IMM GRANULOCYTES NFR BLD AUTO: 0.3 % (ref 0–0.5)
IMM GRANULOCYTES NFR BLD AUTO: 0.5 % (ref 0–0.5)
LYMPHOCYTES # BLD AUTO: 0.7 K/UL (ref 1–4.8)
LYMPHOCYTES # BLD AUTO: 1.6 K/UL (ref 1–4.8)
LYMPHOCYTES NFR BLD: 28.1 % (ref 18–48)
LYMPHOCYTES NFR BLD: 8.6 % (ref 18–48)
MAGNESIUM SERPL-MCNC: 1.7 MG/DL (ref 1.6–2.6)
MAGNESIUM SERPL-MCNC: 1.8 MG/DL (ref 1.6–2.6)
MCH RBC QN AUTO: 23.1 PG (ref 27–31)
MCH RBC QN AUTO: 24.6 PG (ref 27–31)
MCHC RBC AUTO-ENTMCNC: 30.2 G/DL (ref 32–36)
MCHC RBC AUTO-ENTMCNC: 31.7 G/DL (ref 32–36)
MCV RBC AUTO: 77 FL (ref 82–98)
MCV RBC AUTO: 78 FL (ref 82–98)
MONOCYTES # BLD AUTO: 0.5 K/UL (ref 0.3–1)
MONOCYTES # BLD AUTO: 0.5 K/UL (ref 0.3–1)
MONOCYTES NFR BLD: 6.6 % (ref 4–15)
MONOCYTES NFR BLD: 9.1 % (ref 4–15)
NEUTROPHILS # BLD AUTO: 3.3 K/UL (ref 1.8–7.7)
NEUTROPHILS # BLD AUTO: 6.6 K/UL (ref 1.8–7.7)
NEUTROPHILS NFR BLD: 56 % (ref 38–73)
NEUTROPHILS NFR BLD: 84.2 % (ref 38–73)
NRBC BLD-RTO: 0 /100 WBC
NRBC BLD-RTO: 0 /100 WBC
PHOSPHATE SERPL-MCNC: 3.2 MG/DL (ref 2.7–4.5)
PHOSPHATE SERPL-MCNC: 3.8 MG/DL (ref 2.7–4.5)
PLATELET # BLD AUTO: 294 K/UL (ref 150–350)
PLATELET # BLD AUTO: 325 K/UL (ref 150–350)
PMV BLD AUTO: 8.1 FL (ref 9.2–12.9)
PMV BLD AUTO: 8.9 FL (ref 9.2–12.9)
POCT GLUCOSE: 146 MG/DL (ref 70–110)
POCT GLUCOSE: 174 MG/DL (ref 70–110)
POTASSIUM SERPL-SCNC: 4.1 MMOL/L (ref 3.5–5.1)
POTASSIUM SERPL-SCNC: 4.1 MMOL/L (ref 3.5–5.1)
POTASSIUM SERPL-SCNC: 4.3 MMOL/L (ref 3.5–5.1)
PROT SERPL-MCNC: 7.4 G/DL (ref 6–8.4)
RBC # BLD AUTO: 3.46 M/UL (ref 4–5.4)
RBC # BLD AUTO: 3.59 M/UL (ref 4–5.4)
SARS-COV-2 RDRP RESP QL NAA+PROBE: NEGATIVE
SODIUM SERPL-SCNC: 135 MMOL/L (ref 136–145)
SODIUM SERPL-SCNC: 135 MMOL/L (ref 136–145)
SODIUM SERPL-SCNC: 136 MMOL/L (ref 136–145)
WBC # BLD AUTO: 5.84 K/UL (ref 3.9–12.7)
WBC # BLD AUTO: 7.87 K/UL (ref 3.9–12.7)

## 2020-07-31 PROCEDURE — 44120 REMOVAL OF SMALL INTESTINE: CPT | Mod: 51,22,, | Performed by: COLON & RECTAL SURGERY

## 2020-07-31 PROCEDURE — 25000003 PHARM REV CODE 250: Performed by: STUDENT IN AN ORGANIZED HEALTH CARE EDUCATION/TRAINING PROGRAM

## 2020-07-31 PROCEDURE — 80048 BASIC METABOLIC PNL TOTAL CA: CPT

## 2020-07-31 PROCEDURE — 63600175 PHARM REV CODE 636 W HCPCS: Performed by: STUDENT IN AN ORGANIZED HEALTH CARE EDUCATION/TRAINING PROGRAM

## 2020-07-31 PROCEDURE — S0030 INJECTION, METRONIDAZOLE: HCPCS | Performed by: STUDENT IN AN ORGANIZED HEALTH CARE EDUCATION/TRAINING PROGRAM

## 2020-07-31 PROCEDURE — 82962 GLUCOSE BLOOD TEST: CPT | Performed by: COLON & RECTAL SURGERY

## 2020-07-31 PROCEDURE — 86901 BLOOD TYPING SEROLOGIC RH(D): CPT

## 2020-07-31 PROCEDURE — 86920 COMPATIBILITY TEST SPIN: CPT

## 2020-07-31 PROCEDURE — 36620 INSERTION CATHETER ARTERY: CPT | Mod: 59,,, | Performed by: ANESTHESIOLOGY

## 2020-07-31 PROCEDURE — P9016 RBC LEUKOCYTES REDUCED: HCPCS

## 2020-07-31 PROCEDURE — A4216 STERILE WATER/SALINE, 10 ML: HCPCS | Performed by: COLON & RECTAL SURGERY

## 2020-07-31 PROCEDURE — C1769 GUIDE WIRE: HCPCS | Performed by: COLON & RECTAL SURGERY

## 2020-07-31 PROCEDURE — D9220A PRA ANESTHESIA: Mod: ANES,,, | Performed by: ANESTHESIOLOGY

## 2020-07-31 PROCEDURE — 88307 TISSUE EXAM BY PATHOLOGIST: CPT | Mod: 26,,, | Performed by: PATHOLOGY

## 2020-07-31 PROCEDURE — D9220A PRA ANESTHESIA: ICD-10-PCS | Mod: CRNA,,, | Performed by: NURSE ANESTHETIST, CERTIFIED REGISTERED

## 2020-07-31 PROCEDURE — 63600175 PHARM REV CODE 636 W HCPCS: Performed by: SURGERY

## 2020-07-31 PROCEDURE — 36620 PR INSERT CATH,ART,PERCUT,SHORTTERM: ICD-10-PCS | Mod: 59,,, | Performed by: ANESTHESIOLOGY

## 2020-07-31 PROCEDURE — D9220A PRA ANESTHESIA: ICD-10-PCS | Mod: ANES,,, | Performed by: ANESTHESIOLOGY

## 2020-07-31 PROCEDURE — 44146 PR PART REMOVAL COLON W COLOPROC,COLOST: ICD-10-PCS | Mod: 22,,, | Performed by: COLON & RECTAL SURGERY

## 2020-07-31 PROCEDURE — P9045 ALBUMIN (HUMAN), 5%, 250 ML: HCPCS | Mod: JG | Performed by: NURSE ANESTHETIST, CERTIFIED REGISTERED

## 2020-07-31 PROCEDURE — C1758 CATHETER, URETERAL: HCPCS | Performed by: COLON & RECTAL SURGERY

## 2020-07-31 PROCEDURE — 44146 PARTIAL REMOVAL OF COLON: CPT | Mod: 22,,, | Performed by: COLON & RECTAL SURGERY

## 2020-07-31 PROCEDURE — 71000015 HC POSTOP RECOV 1ST HR: Performed by: COLON & RECTAL SURGERY

## 2020-07-31 PROCEDURE — B4185 PARENTERAL SOL 10 GM LIPIDS: HCPCS | Performed by: STUDENT IN AN ORGANIZED HEALTH CARE EDUCATION/TRAINING PROGRAM

## 2020-07-31 PROCEDURE — 25000003 PHARM REV CODE 250: Performed by: SURGERY

## 2020-07-31 PROCEDURE — 84100 ASSAY OF PHOSPHORUS: CPT

## 2020-07-31 PROCEDURE — A4217 STERILE WATER/SALINE, 500 ML: HCPCS | Performed by: STUDENT IN AN ORGANIZED HEALTH CARE EDUCATION/TRAINING PROGRAM

## 2020-07-31 PROCEDURE — 71000033 HC RECOVERY, INTIAL HOUR: Performed by: COLON & RECTAL SURGERY

## 2020-07-31 PROCEDURE — 44120 PR RESECT SMALL INTEST,SINGL RESEC/ANAS: ICD-10-PCS | Mod: 51,22,, | Performed by: COLON & RECTAL SURGERY

## 2020-07-31 PROCEDURE — 25000003 PHARM REV CODE 250: Performed by: COLON & RECTAL SURGERY

## 2020-07-31 PROCEDURE — 36000709 HC OR TIME LEV III EA ADD 15 MIN: Performed by: COLON & RECTAL SURGERY

## 2020-07-31 PROCEDURE — C1765 ADHESION BARRIER: HCPCS | Performed by: COLON & RECTAL SURGERY

## 2020-07-31 PROCEDURE — U0002 COVID-19 LAB TEST NON-CDC: HCPCS

## 2020-07-31 PROCEDURE — 85025 COMPLETE CBC W/AUTO DIFF WBC: CPT | Mod: 91

## 2020-07-31 PROCEDURE — S0028 INJECTION, FAMOTIDINE, 20 MG: HCPCS | Performed by: NURSE ANESTHETIST, CERTIFIED REGISTERED

## 2020-07-31 PROCEDURE — 25000003 PHARM REV CODE 250: Performed by: NURSE ANESTHETIST, CERTIFIED REGISTERED

## 2020-07-31 PROCEDURE — D9220A PRA ANESTHESIA: Mod: CRNA,,, | Performed by: NURSE ANESTHETIST, CERTIFIED REGISTERED

## 2020-07-31 PROCEDURE — 71000016 HC POSTOP RECOV ADDL HR: Performed by: COLON & RECTAL SURGERY

## 2020-07-31 PROCEDURE — 71000039 HC RECOVERY, EACH ADD'L HOUR: Performed by: COLON & RECTAL SURGERY

## 2020-07-31 PROCEDURE — 27100025 HC TUBING, SET FLUID WARMER: Performed by: ANESTHESIOLOGY

## 2020-07-31 PROCEDURE — 36415 COLL VENOUS BLD VENIPUNCTURE: CPT

## 2020-07-31 PROCEDURE — 88307 TISSUE EXAM BY PATHOLOGIST: CPT | Performed by: PATHOLOGY

## 2020-07-31 PROCEDURE — 63600175 PHARM REV CODE 636 W HCPCS: Performed by: NURSE ANESTHETIST, CERTIFIED REGISTERED

## 2020-07-31 PROCEDURE — 63600175 PHARM REV CODE 636 W HCPCS: Performed by: NURSE PRACTITIONER

## 2020-07-31 PROCEDURE — 44310 PR ILEOSTOMY/JEJUNOSTOMY,NONTUBE: ICD-10-PCS | Mod: 51,22,, | Performed by: COLON & RECTAL SURGERY

## 2020-07-31 PROCEDURE — 37000008 HC ANESTHESIA 1ST 15 MINUTES: Performed by: COLON & RECTAL SURGERY

## 2020-07-31 PROCEDURE — 63600175 PHARM REV CODE 636 W HCPCS: Mod: JG | Performed by: NURSE ANESTHETIST, CERTIFIED REGISTERED

## 2020-07-31 PROCEDURE — 63600175 PHARM REV CODE 636 W HCPCS

## 2020-07-31 PROCEDURE — 44310 ILEOSTOMY/JEJUNOSTOMY: CPT | Mod: 51,22,, | Performed by: COLON & RECTAL SURGERY

## 2020-07-31 PROCEDURE — 36000708 HC OR TIME LEV III 1ST 15 MIN: Performed by: COLON & RECTAL SURGERY

## 2020-07-31 PROCEDURE — 83735 ASSAY OF MAGNESIUM: CPT | Mod: 91

## 2020-07-31 PROCEDURE — 80053 COMPREHEN METABOLIC PANEL: CPT

## 2020-07-31 PROCEDURE — 20600001 HC STEP DOWN PRIVATE ROOM

## 2020-07-31 PROCEDURE — 27201423 OPTIME MED/SURG SUP & DEVICES STERILE SUPPLY: Performed by: COLON & RECTAL SURGERY

## 2020-07-31 PROCEDURE — 88307 PR  SURG PATH,LEVEL V: ICD-10-PCS | Mod: 26,,, | Performed by: PATHOLOGY

## 2020-07-31 PROCEDURE — 37000009 HC ANESTHESIA EA ADD 15 MINS: Performed by: COLON & RECTAL SURGERY

## 2020-07-31 DEVICE — MEMBRANE SEPRAFILM 5 X 6: Type: IMPLANTABLE DEVICE | Site: ABDOMEN | Status: FUNCTIONAL

## 2020-07-31 RX ORDER — LIDOCAINE HYDROCHLORIDE 20 MG/ML
INJECTION INTRAVENOUS
Status: DISCONTINUED | OUTPATIENT
Start: 2020-07-31 | End: 2020-07-31

## 2020-07-31 RX ORDER — MUPIROCIN 20 MG/G
OINTMENT TOPICAL 2 TIMES DAILY
Status: DISPENSED | OUTPATIENT
Start: 2020-07-31 | End: 2020-08-05

## 2020-07-31 RX ORDER — ACETAMINOPHEN 10 MG/ML
1000 INJECTION, SOLUTION INTRAVENOUS ONCE
Status: COMPLETED | OUTPATIENT
Start: 2020-07-31 | End: 2020-07-31

## 2020-07-31 RX ORDER — SUCCINYLCHOLINE CHLORIDE 20 MG/ML
INJECTION INTRAMUSCULAR; INTRAVENOUS
Status: DISCONTINUED | OUTPATIENT
Start: 2020-07-31 | End: 2020-07-31

## 2020-07-31 RX ORDER — ENOXAPARIN SODIUM 100 MG/ML
40 INJECTION SUBCUTANEOUS EVERY 24 HOURS
Status: DISCONTINUED | OUTPATIENT
Start: 2020-08-01 | End: 2020-08-21 | Stop reason: HOSPADM

## 2020-07-31 RX ORDER — SODIUM CHLORIDE 0.9 % (FLUSH) 0.9 %
10 SYRINGE (ML) INJECTION
Status: DISCONTINUED | OUTPATIENT
Start: 2020-07-31 | End: 2020-08-21 | Stop reason: HOSPADM

## 2020-07-31 RX ORDER — ACETAMINOPHEN 500 MG
1000 TABLET ORAL EVERY 8 HOURS
Status: DISCONTINUED | OUTPATIENT
Start: 2020-08-01 | End: 2020-08-06

## 2020-07-31 RX ORDER — HYDROMORPHONE HCL IN 0.9% NACL 6 MG/30 ML
PATIENT CONTROLLED ANALGESIA SYRINGE INTRAVENOUS CONTINUOUS
Status: DISCONTINUED | OUTPATIENT
Start: 2020-07-31 | End: 2020-08-04

## 2020-07-31 RX ORDER — TRAMADOL HYDROCHLORIDE 50 MG/1
50 TABLET ORAL EVERY 6 HOURS PRN
Status: DISCONTINUED | OUTPATIENT
Start: 2020-07-31 | End: 2020-08-21 | Stop reason: HOSPADM

## 2020-07-31 RX ORDER — LIDOCAINE HYDROCHLORIDE ANHYDROUS AND DEXTROSE MONOHYDRATE .8; 5 G/100ML; G/100ML
INJECTION, SOLUTION INTRAVENOUS CONTINUOUS PRN
Status: DISCONTINUED | OUTPATIENT
Start: 2020-07-31 | End: 2020-07-31

## 2020-07-31 RX ORDER — PROPOFOL 10 MG/ML
VIAL (ML) INTRAVENOUS
Status: DISCONTINUED | OUTPATIENT
Start: 2020-07-31 | End: 2020-07-31

## 2020-07-31 RX ORDER — PHENYLEPHRINE HYDROCHLORIDE 10 MG/ML
INJECTION INTRAVENOUS
Status: DISCONTINUED | OUTPATIENT
Start: 2020-07-31 | End: 2020-07-31

## 2020-07-31 RX ORDER — SODIUM CHLORIDE 9 MG/ML
INJECTION, SOLUTION INTRAVENOUS CONTINUOUS
Status: DISCONTINUED | OUTPATIENT
Start: 2020-07-31 | End: 2020-08-03

## 2020-07-31 RX ORDER — ONDANSETRON 2 MG/ML
4 INJECTION INTRAMUSCULAR; INTRAVENOUS EVERY 6 HOURS PRN
Status: DISCONTINUED | OUTPATIENT
Start: 2020-07-31 | End: 2020-08-21 | Stop reason: HOSPADM

## 2020-07-31 RX ORDER — ONDANSETRON 2 MG/ML
INJECTION INTRAMUSCULAR; INTRAVENOUS
Status: DISCONTINUED | OUTPATIENT
Start: 2020-07-31 | End: 2020-07-31

## 2020-07-31 RX ORDER — ROCURONIUM BROMIDE 10 MG/ML
INJECTION, SOLUTION INTRAVENOUS
Status: DISCONTINUED | OUTPATIENT
Start: 2020-07-31 | End: 2020-07-31

## 2020-07-31 RX ORDER — NALOXONE HCL 0.4 MG/ML
0.02 VIAL (ML) INJECTION
Status: DISCONTINUED | OUTPATIENT
Start: 2020-07-31 | End: 2020-08-04

## 2020-07-31 RX ORDER — KETAMINE HCL IN 0.9 % NACL 50 MG/5 ML
SYRINGE (ML) INTRAVENOUS
Status: DISCONTINUED | OUTPATIENT
Start: 2020-07-31 | End: 2020-07-31

## 2020-07-31 RX ORDER — FENTANYL CITRATE 50 UG/ML
INJECTION, SOLUTION INTRAMUSCULAR; INTRAVENOUS
Status: DISCONTINUED | OUTPATIENT
Start: 2020-07-31 | End: 2020-07-31

## 2020-07-31 RX ORDER — IBUPROFEN 400 MG/1
800 TABLET ORAL EVERY 8 HOURS
Status: DISCONTINUED | OUTPATIENT
Start: 2020-08-01 | End: 2020-08-06

## 2020-07-31 RX ORDER — HYDROMORPHONE HYDROCHLORIDE 1 MG/ML
INJECTION, SOLUTION INTRAMUSCULAR; INTRAVENOUS; SUBCUTANEOUS
Status: COMPLETED
Start: 2020-07-31 | End: 2020-07-31

## 2020-07-31 RX ORDER — ACETAMINOPHEN 10 MG/ML
1000 INJECTION, SOLUTION INTRAVENOUS EVERY 8 HOURS
Status: COMPLETED | OUTPATIENT
Start: 2020-07-31 | End: 2020-08-01

## 2020-07-31 RX ORDER — HYDROMORPHONE HYDROCHLORIDE 2 MG/ML
INJECTION, SOLUTION INTRAMUSCULAR; INTRAVENOUS; SUBCUTANEOUS
Status: DISCONTINUED | OUTPATIENT
Start: 2020-07-31 | End: 2020-07-31

## 2020-07-31 RX ORDER — SODIUM CHLORIDE 9 MG/ML
INJECTION, SOLUTION INTRAVENOUS CONTINUOUS PRN
Status: DISCONTINUED | OUTPATIENT
Start: 2020-07-31 | End: 2020-07-31

## 2020-07-31 RX ORDER — HYDROMORPHONE HYDROCHLORIDE 1 MG/ML
0.2 INJECTION, SOLUTION INTRAMUSCULAR; INTRAVENOUS; SUBCUTANEOUS
Status: COMPLETED | OUTPATIENT
Start: 2020-07-31 | End: 2020-07-31

## 2020-07-31 RX ORDER — DEXAMETHASONE SODIUM PHOSPHATE 4 MG/ML
INJECTION, SOLUTION INTRA-ARTICULAR; INTRALESIONAL; INTRAMUSCULAR; INTRAVENOUS; SOFT TISSUE
Status: DISCONTINUED | OUTPATIENT
Start: 2020-07-31 | End: 2020-07-31

## 2020-07-31 RX ORDER — ALBUMIN HUMAN 50 G/1000ML
SOLUTION INTRAVENOUS CONTINUOUS PRN
Status: DISCONTINUED | OUTPATIENT
Start: 2020-07-31 | End: 2020-07-31

## 2020-07-31 RX ORDER — FAMOTIDINE 10 MG/ML
INJECTION INTRAVENOUS
Status: DISCONTINUED | OUTPATIENT
Start: 2020-07-31 | End: 2020-07-31

## 2020-07-31 RX ADMIN — FAMOTIDINE 20 MG: 10 INJECTION, SOLUTION INTRAVENOUS at 06:07

## 2020-07-31 RX ADMIN — ACETAMINOPHEN 1000 MG: 10 INJECTION, SOLUTION INTRAVENOUS at 10:07

## 2020-07-31 RX ADMIN — Medication 5 MG: at 10:07

## 2020-07-31 RX ADMIN — ROCURONIUM BROMIDE 50 MG: 10 INJECTION, SOLUTION INTRAVENOUS at 09:07

## 2020-07-31 RX ADMIN — SODIUM CHLORIDE: 0.9 INJECTION, SOLUTION INTRAVENOUS at 09:07

## 2020-07-31 RX ADMIN — HYDROMORPHONE HYDROCHLORIDE 0.5 MG: 1 INJECTION, SOLUTION INTRAMUSCULAR; INTRAVENOUS; SUBCUTANEOUS at 05:07

## 2020-07-31 RX ADMIN — I.V. FAT EMULSION 250 ML: 20 EMULSION INTRAVENOUS at 11:07

## 2020-07-31 RX ADMIN — ROCURONIUM BROMIDE 10 MG: 10 INJECTION, SOLUTION INTRAVENOUS at 02:07

## 2020-07-31 RX ADMIN — FENTANYL CITRATE 50 MCG: 50 INJECTION, SOLUTION INTRAMUSCULAR; INTRAVENOUS at 09:07

## 2020-07-31 RX ADMIN — HYDROMORPHONE HYDROCHLORIDE 0.2 MG: 1 INJECTION, SOLUTION INTRAMUSCULAR; INTRAVENOUS; SUBCUTANEOUS at 09:07

## 2020-07-31 RX ADMIN — DEXAMETHASONE SODIUM PHOSPHATE 8 MG: 4 INJECTION, SOLUTION INTRAMUSCULAR; INTRAVENOUS at 09:07

## 2020-07-31 RX ADMIN — OXYCODONE HYDROCHLORIDE 10 MG: 10 TABLET ORAL at 02:07

## 2020-07-31 RX ADMIN — HYDROMORPHONE HYDROCHLORIDE 0.4 MG: 2 INJECTION, SOLUTION INTRAMUSCULAR; INTRAVENOUS; SUBCUTANEOUS at 08:07

## 2020-07-31 RX ADMIN — HYDROMORPHONE HYDROCHLORIDE 0.2 MG: 2 INJECTION, SOLUTION INTRAMUSCULAR; INTRAVENOUS; SUBCUTANEOUS at 08:07

## 2020-07-31 RX ADMIN — SODIUM CHLORIDE 0.35 MCG/KG/MIN: 9 INJECTION, SOLUTION INTRAVENOUS at 09:07

## 2020-07-31 RX ADMIN — Medication 10 MG: at 05:07

## 2020-07-31 RX ADMIN — LIDOCAINE HYDROCHLORIDE 0.02 MG/KG/MIN: 8 INJECTION, SOLUTION INTRAVENOUS at 10:07

## 2020-07-31 RX ADMIN — CARVEDILOL 25 MG: 25 TABLET, FILM COATED ORAL at 07:07

## 2020-07-31 RX ADMIN — ONDANSETRON 8 MG: 2 INJECTION INTRAMUSCULAR; INTRAVENOUS at 05:07

## 2020-07-31 RX ADMIN — HYDROMORPHONE HYDROCHLORIDE 0.4 MG: 2 INJECTION, SOLUTION INTRAMUSCULAR; INTRAVENOUS; SUBCUTANEOUS at 07:07

## 2020-07-31 RX ADMIN — ROCURONIUM BROMIDE 20 MG: 10 INJECTION, SOLUTION INTRAVENOUS at 09:07

## 2020-07-31 RX ADMIN — SODIUM CHLORIDE 3 UNITS/HR: 9 INJECTION, SOLUTION INTRAVENOUS at 01:07

## 2020-07-31 RX ADMIN — MAGNESIUM SULFATE HEPTAHYDRATE: 500 INJECTION, SOLUTION INTRAMUSCULAR; INTRAVENOUS at 11:07

## 2020-07-31 RX ADMIN — ROCURONIUM BROMIDE 30 MG: 10 INJECTION, SOLUTION INTRAVENOUS at 11:07

## 2020-07-31 RX ADMIN — HYDROMORPHONE HYDROCHLORIDE 0.2 MG: 2 INJECTION, SOLUTION INTRAMUSCULAR; INTRAVENOUS; SUBCUTANEOUS at 07:07

## 2020-07-31 RX ADMIN — Medication 5 MG: at 02:07

## 2020-07-31 RX ADMIN — HYDROMORPHONE HYDROCHLORIDE 0.2 MG: 1 INJECTION, SOLUTION INTRAMUSCULAR; INTRAVENOUS; SUBCUTANEOUS at 10:07

## 2020-07-31 RX ADMIN — PHENYLEPHRINE HYDROCHLORIDE 100 MCG: 10 INJECTION INTRAVENOUS at 10:07

## 2020-07-31 RX ADMIN — SODIUM CHLORIDE 0.4 MCG/KG/MIN: 9 INJECTION, SOLUTION INTRAVENOUS at 10:07

## 2020-07-31 RX ADMIN — SODIUM CHLORIDE, SODIUM GLUCONATE, SODIUM ACETATE, POTASSIUM CHLORIDE, MAGNESIUM CHLORIDE, SODIUM PHOSPHATE, DIBASIC, AND POTASSIUM PHOSPHATE: .53; .5; .37; .037; .03; .012; .00082 INJECTION, SOLUTION INTRAVENOUS at 05:07

## 2020-07-31 RX ADMIN — CALCIUM CHLORIDE 200 MG: 100 INJECTION, SOLUTION INTRAVENOUS at 03:07

## 2020-07-31 RX ADMIN — ACETAMINOPHEN 1000 MG: 10 INJECTION, SOLUTION INTRAVENOUS at 03:07

## 2020-07-31 RX ADMIN — CALCIUM CHLORIDE 200 MG: 100 INJECTION, SOLUTION INTRAVENOUS at 04:07

## 2020-07-31 RX ADMIN — FENTANYL CITRATE 25 MCG: 50 INJECTION, SOLUTION INTRAMUSCULAR; INTRAVENOUS at 06:07

## 2020-07-31 RX ADMIN — METRONIDAZOLE 500 MG: 500 INJECTION, SOLUTION INTRAVENOUS at 10:07

## 2020-07-31 RX ADMIN — CEFTRIAXONE 2 G: 2 INJECTION, SOLUTION INTRAVENOUS at 09:07

## 2020-07-31 RX ADMIN — ROCURONIUM BROMIDE 10 MG: 10 INJECTION, SOLUTION INTRAVENOUS at 03:07

## 2020-07-31 RX ADMIN — METRONIDAZOLE 500 MG: 500 INJECTION, SOLUTION INTRAVENOUS at 06:07

## 2020-07-31 RX ADMIN — FENTANYL CITRATE 50 MCG: 50 INJECTION, SOLUTION INTRAMUSCULAR; INTRAVENOUS at 03:07

## 2020-07-31 RX ADMIN — ALBUMIN (HUMAN): 12.5 SOLUTION INTRAVENOUS at 02:07

## 2020-07-31 RX ADMIN — ONDANSETRON 4 MG: 2 INJECTION, SOLUTION INTRAMUSCULAR; INTRAVENOUS at 06:07

## 2020-07-31 RX ADMIN — Medication 10 ML: at 12:07

## 2020-07-31 RX ADMIN — HYDROMORPHONE HYDROCHLORIDE 0.2 MG: 1 INJECTION, SOLUTION INTRAMUSCULAR; INTRAVENOUS; SUBCUTANEOUS at 08:07

## 2020-07-31 RX ADMIN — SODIUM CHLORIDE, SODIUM GLUCONATE, SODIUM ACETATE, POTASSIUM CHLORIDE, MAGNESIUM CHLORIDE, SODIUM PHOSPHATE, DIBASIC, AND POTASSIUM PHOSPHATE: .53; .5; .37; .037; .03; .012; .00082 INJECTION, SOLUTION INTRAVENOUS at 11:07

## 2020-07-31 RX ADMIN — SUCCINYLCHOLINE CHLORIDE 140 MG: 20 INJECTION, SOLUTION INTRAMUSCULAR; INTRAVENOUS at 09:07

## 2020-07-31 RX ADMIN — Medication 5 MG: at 01:07

## 2020-07-31 RX ADMIN — HYDROMORPHONE HYDROCHLORIDE 0.5 MG: 1 INJECTION, SOLUTION INTRAMUSCULAR; INTRAVENOUS; SUBCUTANEOUS at 12:07

## 2020-07-31 RX ADMIN — SODIUM CHLORIDE: 0.9 INJECTION, SOLUTION INTRAVENOUS at 08:07

## 2020-07-31 RX ADMIN — PROPOFOL 100 MG: 10 INJECTION, EMULSION INTRAVENOUS at 09:07

## 2020-07-31 RX ADMIN — SUGAMMADEX 300 MG: 100 INJECTION, SOLUTION INTRAVENOUS at 07:07

## 2020-07-31 RX ADMIN — ROCURONIUM BROMIDE 10 MG: 10 INJECTION, SOLUTION INTRAVENOUS at 06:07

## 2020-07-31 RX ADMIN — INSULIN ASPART 2 UNITS: 100 INJECTION, SOLUTION INTRAVENOUS; SUBCUTANEOUS at 09:07

## 2020-07-31 RX ADMIN — ONDANSETRON 8 MG: 2 INJECTION INTRAMUSCULAR; INTRAVENOUS at 11:07

## 2020-07-31 RX ADMIN — SODIUM CHLORIDE, SODIUM GLUCONATE, SODIUM ACETATE, POTASSIUM CHLORIDE, MAGNESIUM CHLORIDE, SODIUM PHOSPHATE, DIBASIC, AND POTASSIUM PHOSPHATE: .53; .5; .37; .037; .03; .012; .00082 INJECTION, SOLUTION INTRAVENOUS at 10:07

## 2020-07-31 RX ADMIN — ROCURONIUM BROMIDE 10 MG: 10 INJECTION, SOLUTION INTRAVENOUS at 04:07

## 2020-07-31 RX ADMIN — IBUPROFEN 800 MG: 800 INJECTION INTRAVENOUS at 11:07

## 2020-07-31 RX ADMIN — LIDOCAINE HYDROCHLORIDE 100 MG: 20 INJECTION, SOLUTION INTRAVENOUS at 09:07

## 2020-07-31 RX ADMIN — Medication 5 MG: at 11:07

## 2020-07-31 RX ADMIN — Medication 10 MG: at 06:07

## 2020-07-31 RX ADMIN — ACETAMINOPHEN 1000 MG: 10 INJECTION, SOLUTION INTRAVENOUS at 11:07

## 2020-07-31 RX ADMIN — Medication 25 MG: at 09:07

## 2020-07-31 RX ADMIN — Medication: at 10:07

## 2020-07-31 RX ADMIN — PROMETHAZINE HYDROCHLORIDE 6.25 MG: 25 INJECTION INTRAMUSCULAR; INTRAVENOUS at 12:07

## 2020-07-31 RX ADMIN — SODIUM CHLORIDE, SODIUM GLUCONATE, SODIUM ACETATE, POTASSIUM CHLORIDE, MAGNESIUM CHLORIDE, SODIUM PHOSPHATE, DIBASIC, AND POTASSIUM PHOSPHATE: .53; .5; .37; .037; .03; .012; .00082 INJECTION, SOLUTION INTRAVENOUS at 09:07

## 2020-07-31 RX ADMIN — Medication 5 MG: at 12:07

## 2020-07-31 RX ADMIN — Medication 10 ML: at 06:07

## 2020-07-31 RX ADMIN — SODIUM CHLORIDE, SODIUM GLUCONATE, SODIUM ACETATE, POTASSIUM CHLORIDE, MAGNESIUM CHLORIDE, SODIUM PHOSPHATE, DIBASIC, AND POTASSIUM PHOSPHATE: .53; .5; .37; .037; .03; .012; .00082 INJECTION, SOLUTION INTRAVENOUS at 04:07

## 2020-07-31 RX ADMIN — Medication 10 MG: at 04:07

## 2020-07-31 NOTE — PROGRESS NOTES
Wound care follow-up - patient is scheduled for surgery today- not seen. Will plan on continuing wound care/ostomy visits after surgery.

## 2020-07-31 NOTE — BRIEF OP NOTE
[facility]  Brief Operative Note    SUMMARY     Surgery Date: 7/31/2020     Surgeon(s) and Role:  Panel 1: Colorectal surgery     * Fabiana Valiente MD - Primary     * Evangelist Romero MD - Assisting     * REZA Iyer MD - Co-Surgeon     * Dany Vigil MD - Co-Surgeon     * Bethany Castro MD - Resident  Panel 2: Urology     * Kvng Cunningham MD - Primary     * Tracy Gaitan MD - Resident - Assisting        Pre-Operative Care:  Pre-operative bowel prep N/A  IV antibiotics given within 1 hour of incision? Yes  Preoperative multimodal pain control? No    Pre-op Diagnosis:  Cutaneous fistula [L98.8]  History of hemicolectomy [Z90.49]    Operative Care:  Post-op Diagnosis: Post-Op Diagnosis Codes:     * Cutaneous fistula [L98.8]     * History of hemicolectomy [Z90.49]    Procedure(s) (LRB):  1. Extensive lysis of adhesions - 22 modifier  2. Partial colectomy with ascending colon to rectum anastomosis using Deloyer's derotation.  3. Small bowel resection  4. Diverting loop ileostomy  5. Cystoscopy with bilateral ureteral catheters by urology    Anesthesia: General    Technical Procedures Used:   Use of closing instrument setup? Yes  Gown/Glove Change @ time of closing? Yes  Suction tip change at time of closing? Yes    Description of the findings of the procedure:   Wound Class (Clean-contaminated)    Complications: No     Estimated Blood Loss: 800 mL           Specimens:   1. Sigmoid colon  2. Small bowel  3. Colon      Implants: None    Post-Operative Care:         Disposition: PACU

## 2020-07-31 NOTE — ANESTHESIA PROCEDURE NOTES
Arterial    Diagnosis: Partial SBO    Patient location during procedure: done in OR  Procedure start time: 7/31/2020 9:40 AM  Timeout: 7/31/2020 9:40 AM  Procedure end time: 7/31/2020 9:45 AM    Staffing  Authorizing Provider: Kaila Pretty MD  Performing Provider: Kamari Zhang MD    Anesthesiologist was present at the time of the procedure.    Preanesthetic Checklist  Completed: patient identified, site marked, surgical consent, pre-op evaluation, timeout performed, IV checked, risks and benefits discussed, monitors and equipment checked and anesthesia consent givenArterial  Skin Prep: alcohol swabs  Local Infiltration: none  Orientation: right  Location: radial  Catheter Size: 20 G  Catheter placement by Anatomical landmarks. Heme positive aspiration all ports.Insertion Attempts: 2  Assessment  Dressing: secured with tape and tegaderm  Patient: Tolerated well

## 2020-07-31 NOTE — PLAN OF CARE
Patient alert and oriented.  Able to verbalize needs in English and Ghanaian. Comprehends her plan of care.  PICC in place patent.  TPN infusing as ordered.  Pain meds given as ordered with short relief.  Up and to the bathroom during the shift.  Surgery scheduled for the AM.  Will continue to monitor.

## 2020-07-31 NOTE — PLAN OF CARE
POC reviewed, pt verbalized understanding. VSS on RA. Pain unmanaged with PRN pain meds, constantly states that pain is a 10/10 regardless of med administration. Voids per toilet, adequate UOP overnight. Pt  NPO w/ complaints of N/V, PRN meds given. Pt slept between care. Frequent rounds made for safety, call light in reach. WCTM        Problem: Diabetes Comorbidity  Goal: Blood Glucose Level Within Desired Range  Outcome: Ongoing, Progressing     Problem: Wound  Goal: Optimal Wound Healing  Outcome: Ongoing, Progressing     Problem: Adult Inpatient Plan of Care  Goal: Plan of Care Review  Outcome: Ongoing, Progressing  Goal: Patient-Specific Goal (Individualization)  Outcome: Ongoing, Progressing  Goal: Absence of Hospital-Acquired Illness or Injury  Outcome: Ongoing, Progressing  Goal: Optimal Comfort and Wellbeing  Outcome: Ongoing, Progressing  Goal: Readiness for Transition of Care  Outcome: Ongoing, Progressing  Goal: Rounds/Family Conference  Outcome: Ongoing, Progressing     Problem: Diabetes Comorbidity  Goal: Blood Glucose Level Within Desired Range  Outcome: Ongoing, Progressing     Problem: Infection  Goal: Infection Symptom Resolution  Outcome: Ongoing, Progressing     Problem: Skin Injury Risk Increased  Goal: Skin Health and Integrity  Outcome: Ongoing, Progressing     Problem: Fall Injury Risk  Goal: Absence of Fall and Fall-Related Injury  Outcome: Ongoing, Progressing     Problem: Hypertension Comorbidity  Goal: Blood Pressure in Desired Range  Outcome: Ongoing, Progressing

## 2020-07-31 NOTE — OP NOTE
[facility]  Operative Note    Date: 07/31/2020    Pre-Op Diagnosis: perforated diverticulitis s/p Aiyana's. Now with enterocutaneous fistula.    Post-Op Diagnosis: same    Procedure(s) Performed:   1.  Cystoscopy with bilateral ureteral catheter placement    Specimen(s): none    Staff Surgeon: MD Terence    Assistant Surgeon: Tracy Gaitan MD    Anesthesia: General endotracheal anesthesia    Indications: Azucena Morrison is a 70 y.o. female with enterocutaneous fistula.  Dr. Valiente has requested intra-operative ureteral catheters to allow for early intra-operative identification and repair of any injuries.      Findings: Normal cystoscopy     Estimated Blood Loss: min    Drains:   1.  bilateral 5 Fr ureteral catheters  2.  16 Fr muir catheter    Procedure in Detail: Upon entering the room the patient was under general anesthesia.  The patient was then placed in the dorsal lithotomy position and prepped and draped in the usual sterile fashion. Preoperative antibiotics were administered per the primary surgeon preference.  Timeout was performed.      A 22 Fr cystoscope was inserted into the urethra and formal cystourethroscopy was performed. The urethra was normal.  The right and left ureteral orifices were in the normal anatomic position. There were no mucosal abnormalities. A 0.38 guide wire was inserted into the left  ureteral orifice and advanced to the level of the left renal pelvis. A 5 Fr ureteral catheter was then inserted over the guide wire and the wire was removed. The cystoscope was then removed leaving the ureteral catheter in place.     The cystoscope was then reinserted alongside the ureteral catheter and a 0.38 guidewire was inserted into the right ureteral orifice and advanced to the level of the right renal pelvis. A 5 Fr ureteral catheter was advanced over the wire and the wire was removed. The cystoscope was then removed.      A 16 Fr muir catheter was inserted and the  balloon was filled with 10mL of sterile water. The ureteral catheters were secured in the standard fashion. There were no complications with the procedure and the patient tolerated our procedure well.     The case was then turned over to the primary surgeon.     Tracy Gaitan MD

## 2020-07-31 NOTE — NURSING TRANSFER
Nursing Transfer Note      7/31/2020     Transfer from [facility] to Pre Op @ 7:40am    Transfer via Wheelchair    Transfer with Escort      Medicines sent: None    Chart send with patient: Yes}

## 2020-07-31 NOTE — PROGRESS NOTES
Spoke with charge nurse Selina and explained that pt needs a covid swab stat for surgery and to walk to lab, v/u

## 2020-07-31 NOTE — PROGRESS NOTES
Pt Polish speaking, all conversation translated per [facility]  on phone.  Pt states all consent were explained using phone  as well.  Dr. Pretty to bedside, pt states no questions about anesthesia via .  Dr. Pretty notified unable to flush either of lumens of picc. [facility]  PICC team called, Pinky PEÑA from PICC team able to get lumens open. TPN restarted.  PIV started right forearm while waiting for PICC team nurse.  Dr Pretty notified PICC lines now open, has piv and has abd pain, only oral pain meds ordered, states going to OR and will bring pain meds to give IV.

## 2020-07-31 NOTE — ANESTHESIA PROCEDURE NOTES
Intubation  Performed by: Kamari Zhang MD  Authorized by: Kaila Pretty MD     Intubation:     Induction:  Intravenous    Intubated:  Postinduction    Mask Ventilation:  Easy mask    Attempts:  1    Attempted By:  Resident anesthesiologist    Method of Intubation:  Direct    Blade:  Troy 2    Laryngeal View Grade: Grade I - full view of chords      Difficult Airway Encountered?: No      Complications:  None    Airway Device:  Oral endotracheal tube    Airway Device Size:  7.0    Style/Cuff Inflation:  Cuffed    Inflation Amount (mL):  3    Tube secured:  20    Secured at:  The lips    Placement Verified By:  Capnometry    Complicating Factors:  None    Findings Post-Intubation:  BS equal bilateral and atraumatic/condition of teeth unchanged  Notes:      RSI performed d/t partial SBO High ERAS case

## 2020-07-31 NOTE — PLAN OF CARE
Patient to OR today, not medically ready for d/c.      07/31/20 1030   Discharge Reassessment   Assessment Type Discharge Planning Reassessment   Discharge Plan A Home   Discharge Plan B Other  (TBD)   DME Needed Upon Discharge  none   Anticipated Discharge Disposition Home   Rosy Lynch RN, CM   Ext: [phone]

## 2020-08-01 LAB
ALBUMIN SERPL BCP-MCNC: 1.9 G/DL (ref 3.5–5.2)
ALP SERPL-CCNC: 56 U/L (ref 55–135)
ALT SERPL W/O P-5'-P-CCNC: 13 U/L (ref 10–44)
ANION GAP SERPL CALC-SCNC: 7 MMOL/L (ref 8–16)
AST SERPL-CCNC: 26 U/L (ref 10–40)
BASOPHILS # BLD AUTO: 0.02 K/UL (ref 0–0.2)
BASOPHILS # BLD AUTO: 0.02 K/UL (ref 0–0.2)
BASOPHILS NFR BLD: 0.1 % (ref 0–1.9)
BASOPHILS NFR BLD: 0.1 % (ref 0–1.9)
BILIRUB SERPL-MCNC: 1.4 MG/DL (ref 0.1–1)
BUN SERPL-MCNC: 19 MG/DL (ref 8–23)
CALCIUM SERPL-MCNC: 7.5 MG/DL (ref 8.7–10.5)
CHLORIDE SERPL-SCNC: 104 MMOL/L (ref 95–110)
CO2 SERPL-SCNC: 22 MMOL/L (ref 23–29)
CREAT SERPL-MCNC: 0.9 MG/DL (ref 0.5–1.4)
DIFFERENTIAL METHOD: ABNORMAL
DIFFERENTIAL METHOD: ABNORMAL
EOSINOPHIL # BLD AUTO: 0 K/UL (ref 0–0.5)
EOSINOPHIL # BLD AUTO: 0 K/UL (ref 0–0.5)
EOSINOPHIL NFR BLD: 0 % (ref 0–8)
EOSINOPHIL NFR BLD: 0 % (ref 0–8)
ERYTHROCYTE [DISTWIDTH] IN BLOOD BY AUTOMATED COUNT: 16.4 % (ref 11.5–14.5)
ERYTHROCYTE [DISTWIDTH] IN BLOOD BY AUTOMATED COUNT: 16.4 % (ref 11.5–14.5)
EST. GFR  (AFRICAN AMERICAN): >60 ML/MIN/1.73 M^2
EST. GFR  (NON AFRICAN AMERICAN): >60 ML/MIN/1.73 M^2
GLUCOSE SERPL-MCNC: 295 MG/DL (ref 70–110)
HCT VFR BLD AUTO: 27 % (ref 37–48.5)
HCT VFR BLD AUTO: 27 % (ref 37–48.5)
HGB BLD-MCNC: 8.6 G/DL (ref 12–16)
HGB BLD-MCNC: 8.6 G/DL (ref 12–16)
IMM GRANULOCYTES # BLD AUTO: 0.09 K/UL (ref 0–0.04)
IMM GRANULOCYTES # BLD AUTO: 0.09 K/UL (ref 0–0.04)
IMM GRANULOCYTES NFR BLD AUTO: 0.6 % (ref 0–0.5)
IMM GRANULOCYTES NFR BLD AUTO: 0.6 % (ref 0–0.5)
LYMPHOCYTES # BLD AUTO: 1.1 K/UL (ref 1–4.8)
LYMPHOCYTES # BLD AUTO: 1.1 K/UL (ref 1–4.8)
LYMPHOCYTES NFR BLD: 7.1 % (ref 18–48)
LYMPHOCYTES NFR BLD: 7.1 % (ref 18–48)
MAGNESIUM SERPL-MCNC: 1.6 MG/DL (ref 1.6–2.6)
MAGNESIUM SERPL-MCNC: 1.6 MG/DL (ref 1.6–2.6)
MCH RBC QN AUTO: 24.6 PG (ref 27–31)
MCH RBC QN AUTO: 24.6 PG (ref 27–31)
MCHC RBC AUTO-ENTMCNC: 31.9 G/DL (ref 32–36)
MCHC RBC AUTO-ENTMCNC: 31.9 G/DL (ref 32–36)
MCV RBC AUTO: 77 FL (ref 82–98)
MCV RBC AUTO: 77 FL (ref 82–98)
MONOCYTES # BLD AUTO: 0.4 K/UL (ref 0.3–1)
MONOCYTES # BLD AUTO: 0.4 K/UL (ref 0.3–1)
MONOCYTES NFR BLD: 2.4 % (ref 4–15)
MONOCYTES NFR BLD: 2.4 % (ref 4–15)
NEUTROPHILS # BLD AUTO: 13.8 K/UL (ref 1.8–7.7)
NEUTROPHILS # BLD AUTO: 13.8 K/UL (ref 1.8–7.7)
NEUTROPHILS NFR BLD: 89.8 % (ref 38–73)
NEUTROPHILS NFR BLD: 89.8 % (ref 38–73)
NRBC BLD-RTO: 0 /100 WBC
NRBC BLD-RTO: 0 /100 WBC
PHOSPHATE SERPL-MCNC: 1.8 MG/DL (ref 2.7–4.5)
PHOSPHATE SERPL-MCNC: 1.8 MG/DL (ref 2.7–4.5)
PLATELET # BLD AUTO: 316 K/UL (ref 150–350)
PLATELET # BLD AUTO: 316 K/UL (ref 150–350)
PLATELET BLD QL SMEAR: ABNORMAL
PLATELET BLD QL SMEAR: ABNORMAL
PMV BLD AUTO: 9.2 FL (ref 9.2–12.9)
PMV BLD AUTO: 9.2 FL (ref 9.2–12.9)
POCT GLUCOSE: 176 MG/DL (ref 70–110)
POCT GLUCOSE: 199 MG/DL (ref 70–110)
POCT GLUCOSE: 350 MG/DL (ref 70–110)
POTASSIUM SERPL-SCNC: 4 MMOL/L (ref 3.5–5.1)
PROT SERPL-MCNC: 4.9 G/DL (ref 6–8.4)
RBC # BLD AUTO: 3.5 M/UL (ref 4–5.4)
RBC # BLD AUTO: 3.5 M/UL (ref 4–5.4)
SODIUM SERPL-SCNC: 133 MMOL/L (ref 136–145)
WBC # BLD AUTO: 15.39 K/UL (ref 3.9–12.7)
WBC # BLD AUTO: 15.39 K/UL (ref 3.9–12.7)

## 2020-08-01 PROCEDURE — 25000003 PHARM REV CODE 250: Performed by: STUDENT IN AN ORGANIZED HEALTH CARE EDUCATION/TRAINING PROGRAM

## 2020-08-01 PROCEDURE — 63600175 PHARM REV CODE 636 W HCPCS: Performed by: STUDENT IN AN ORGANIZED HEALTH CARE EDUCATION/TRAINING PROGRAM

## 2020-08-01 PROCEDURE — 80053 COMPREHEN METABOLIC PANEL: CPT

## 2020-08-01 PROCEDURE — 99900035 HC TECH TIME PER 15 MIN (STAT)

## 2020-08-01 PROCEDURE — 94770 HC EXHALED C02 TEST: CPT

## 2020-08-01 PROCEDURE — 83735 ASSAY OF MAGNESIUM: CPT

## 2020-08-01 PROCEDURE — 97116 GAIT TRAINING THERAPY: CPT

## 2020-08-01 PROCEDURE — 97535 SELF CARE MNGMENT TRAINING: CPT

## 2020-08-01 PROCEDURE — 94761 N-INVAS EAR/PLS OXIMETRY MLT: CPT

## 2020-08-01 PROCEDURE — 97161 PT EVAL LOW COMPLEX 20 MIN: CPT

## 2020-08-01 PROCEDURE — C9113 INJ PANTOPRAZOLE SODIUM, VIA: HCPCS | Performed by: STUDENT IN AN ORGANIZED HEALTH CARE EDUCATION/TRAINING PROGRAM

## 2020-08-01 PROCEDURE — 85025 COMPLETE CBC W/AUTO DIFF WBC: CPT

## 2020-08-01 PROCEDURE — 97165 OT EVAL LOW COMPLEX 30 MIN: CPT

## 2020-08-01 PROCEDURE — 84100 ASSAY OF PHOSPHORUS: CPT

## 2020-08-01 PROCEDURE — 20600001 HC STEP DOWN PRIVATE ROOM

## 2020-08-01 PROCEDURE — A4216 STERILE WATER/SALINE, 10 ML: HCPCS | Performed by: STUDENT IN AN ORGANIZED HEALTH CARE EDUCATION/TRAINING PROGRAM

## 2020-08-01 RX ORDER — LORAZEPAM 2 MG/ML
0.5 INJECTION INTRAMUSCULAR ONCE
Status: DISCONTINUED | OUTPATIENT
Start: 2020-08-01 | End: 2020-08-01

## 2020-08-01 RX ORDER — LORAZEPAM 2 MG/ML
0.5 INJECTION INTRAMUSCULAR ONCE
Status: DISCONTINUED | OUTPATIENT
Start: 2020-08-01 | End: 2020-08-05

## 2020-08-01 RX ORDER — MAGNESIUM SULFATE HEPTAHYDRATE 40 MG/ML
2 INJECTION, SOLUTION INTRAVENOUS ONCE
Status: COMPLETED | OUTPATIENT
Start: 2020-08-01 | End: 2020-08-01

## 2020-08-01 RX ORDER — PANTOPRAZOLE SODIUM 40 MG/10ML
40 INJECTION, POWDER, LYOPHILIZED, FOR SOLUTION INTRAVENOUS DAILY
Status: DISCONTINUED | OUTPATIENT
Start: 2020-08-01 | End: 2020-08-05

## 2020-08-01 RX ADMIN — MUPIROCIN: 20 OINTMENT TOPICAL at 09:08

## 2020-08-01 RX ADMIN — ACETAMINOPHEN 1000 MG: 10 INJECTION, SOLUTION INTRAVENOUS at 03:08

## 2020-08-01 RX ADMIN — INSULIN ASPART 8 UNITS: 100 INJECTION, SOLUTION INTRAVENOUS; SUBCUTANEOUS at 09:08

## 2020-08-01 RX ADMIN — Medication: at 11:08

## 2020-08-01 RX ADMIN — ACETAMINOPHEN 1000 MG: 500 TABLET ORAL at 09:08

## 2020-08-01 RX ADMIN — Medication 10 ML: at 12:08

## 2020-08-01 RX ADMIN — MAGNESIUM SULFATE IN WATER 2 G: 40 INJECTION, SOLUTION INTRAVENOUS at 05:08

## 2020-08-01 RX ADMIN — INSULIN ASPART 1 UNITS: 100 INJECTION, SOLUTION INTRAVENOUS; SUBCUTANEOUS at 09:08

## 2020-08-01 RX ADMIN — CARVEDILOL 25 MG: 25 TABLET, FILM COATED ORAL at 08:08

## 2020-08-01 RX ADMIN — Medication 10 ML: at 06:08

## 2020-08-01 RX ADMIN — PROMETHAZINE HYDROCHLORIDE 6.25 MG: 25 INJECTION INTRAMUSCULAR; INTRAVENOUS at 04:08

## 2020-08-01 RX ADMIN — IBUPROFEN 800 MG: 800 INJECTION INTRAVENOUS at 06:08

## 2020-08-01 RX ADMIN — SODIUM PHOSPHATE, MONOBASIC, MONOHYDRATE 20.01 MMOL: 276; 142 INJECTION, SOLUTION INTRAVENOUS at 05:08

## 2020-08-01 RX ADMIN — ENOXAPARIN SODIUM 40 MG: 40 INJECTION SUBCUTANEOUS at 04:08

## 2020-08-01 RX ADMIN — PANTOPRAZOLE SODIUM 40 MG: 40 INJECTION, POWDER, LYOPHILIZED, FOR SOLUTION INTRAVENOUS at 06:08

## 2020-08-01 RX ADMIN — INSULIN ASPART 2 UNITS: 100 INJECTION, SOLUTION INTRAVENOUS; SUBCUTANEOUS at 01:08

## 2020-08-01 RX ADMIN — ACETAMINOPHEN 1000 MG: 10 INJECTION, SOLUTION INTRAVENOUS at 05:08

## 2020-08-01 RX ADMIN — ONDANSETRON 4 MG: 2 INJECTION INTRAMUSCULAR; INTRAVENOUS at 08:08

## 2020-08-01 RX ADMIN — IBUPROFEN 800 MG: 400 TABLET, FILM COATED ORAL at 09:08

## 2020-08-01 RX ADMIN — IBUPROFEN 800 MG: 800 INJECTION INTRAVENOUS at 04:08

## 2020-08-01 RX ADMIN — MUPIROCIN: 20 OINTMENT TOPICAL at 10:08

## 2020-08-01 NOTE — CARE UPDATE
Rapid Response Nurse Chart Check     Chart check completed, abnormal VS noted. Bedside RNBetty contacted. No concerns verbalized at this time. Instructed to call [phone] for further concerns or assistance.

## 2020-08-01 NOTE — PROGRESS NOTES
[facility]  Colorectal Surgery  Progress Note    Patient Name: Azucena Morrison  MRN: [id number]  Admission Date: 7/15/2020  Hospital Length of Stay: 17 days  Attending Physician: Fabiana Valiente MD    Subjective:     Interval History:   Issues with pain overnight, perhaps not understanding PCA. Tachycardic. This morning seems better. NG pulled out overnight. Nauseated. Higginbotham in. Not OOB yet. TPN infusing.    Post-Op Info:  Procedure(s) (LRB):  RESECTION, COLON, LOW ANTERIOR (N/A)  CYSTOSCOPY (N/A)  CATHETERIZATION, URETER (Bilateral)  CREATION, ILEOSTOMY  LYSIS, ADHESIONS   1 Day Post-Op      Medications:  Continuous Infusions:   sodium chloride 0.9% 50 mL/hr at 08/01/20 1115    hydromorphone in 0.9 % NaCl 6 mg/30 ml      TPN ADULT CENTRAL LINE CUSTOM       Scheduled Meds:   acetaminophen  1,000 mg Intravenous Q8H    Followed by    acetaminophen  1,000 mg Oral Q8H    carvediloL  25 mg Oral Daily    enoxaparin  40 mg Subcutaneous Q24H    ibuprofen  800 mg Intravenous Q8H    Followed by    ibuprofen  800 mg Oral Q8H    lorazepam  0.5 mg Intravenous Once    mupirocin   Nasal BID    sodium chloride 0.9%  10 mL Intravenous Q6H     PRN Meds:   dextrose 50%    glucagon (human recombinant)    insulin aspart U-100    naloxone    ondansetron    ondansetron    oxyCODONE    oxyCODONE    promethazine (PHENERGAN) IVPB    sodium chloride 0.9%    sodium chloride 0.9%    traMADoL        Objective:     Vital Signs (Most Recent):  Temp: 98.4 °F (36.9 °C) (08/01/20 0758)  Pulse: 95 (08/01/20 0758)  Resp: 13 (08/01/20 1156)  BP: (!) 114/55 (08/01/20 0758)  SpO2: 96 % (08/01/20 0758) Vital Signs (24h Range):  Temp:  [96.2 °F (35.7 °C)-98.6 °F (37 °C)] 98.4 °F (36.9 °C)  Pulse:  [] 95  Resp:  [12-21] 13  SpO2:  [84 %-100 %] 96 %  BP: (114-172)/(55-93) 114/55  Arterial Line BP: (183-186)/(80-87) 186/87     Intake/Output - Last 3 Shifts       07/30 0700 - 07/31 0659 07/31 0700 - 08/01 0659  08/01 0700 - 08/02 0659    P.O.  0     I.V. (mL/kg)  6386.7 (90.3)     Blood  350     IV Piggyback  950     TPN  1617.5     Total Intake(mL/kg)  9304.2 (131.6)     Urine (mL/kg/hr) 700 (0.4) 2605 (1.5) 250 (0.6)    Emesis/NG output  100     Drains   200    Other  0     Stool 0 0     Blood  800     Total Output 700 3505 450    Net -700 +5799.2 -450           Urine Occurrence 5 x      Stool Occurrence 1 x 0 x           Physical Exam  Constitutional:       General: She is not in acute distress.  HENT:      Head: Normocephalic.   Eyes:      Conjunctiva/sclera: Conjunctivae normal.   Neck:      Trachea: No tracheal deviation.   Cardiovascular:      Rate and Rhythm: Regular rhythm.   Pulmonary:      Effort: Pulmonary effort is normal. No respiratory distress.   Abdominal:      Comments: Provena in place.   Appliance on ileostomy; stoma pink. Bowel sweat in bag.  Neurological:      Mental Status: She is alert and oriented to person, place, and time.         Significant Labs:  BMP (Last 3 Results):   Recent Labs   Lab 07/31/20 0515 07/31/20 2031 08/01/20  0420   *  152* 190* 295*  295*  295*   *  135* 136 133*  133*  133*   K 4.1  4.1 4.3 4.0  4.0  4.0     100 107 104  104  104   CO2 29  29 21* 22*  22*  22*   BUN 15  15 16 19  19  19   CREATININE 0.9  0.9 0.8 0.9  0.9  0.9   CALCIUM 8.8  8.8 7.8* 7.5*  7.5*  7.5*   MG 1.8 1.7 1.6  1.6     CBC (Last 3 Results):   Recent Labs   Lab 07/31/20 0515 07/31/20 2031 08/01/20  0420   WBC 5.84 7.87 15.39*  15.39*   RBC 3.59* 3.46* 3.50*  3.50*   HGB 8.3* 8.5* 8.6*  8.6*   HCT 27.5* 26.8* 27.0*  27.0*    294 316  316   MCV 77* 78* 77*  77*   MCH 23.1* 24.6* 24.6*  24.6*   MCHC 30.2* 31.7* 31.9*  31.9*         Assessment/Plan:     * Cutaneous fistula  70 y.o. female 1 Day Post-Op for Procedure(s) (LRB):  RESECTION, COLON, LOW ANTERIOR (N/A)  CYSTOSCOPY (N/A)  CATHETERIZATION, URETER (Bilateral)  CREATION, ILEOSTOMY  LYSIS,  ADHESIONS    NPO  IVF  NG tube to LIWS  Wean off TPN  Keep muir  IS  OOB  PT/OT    Continue communication with use of       Chronic kidney disease, stage 3  Monitor labs  Monitor I&O    Generalized abdominal pain  Continue IV pain meds  PCA    Essential hypertension  Monitor BP  Hold home carvedilol while NG tube - will add IV labetalol if necessary. Not HTN this morning.    Type 2 diabetes mellitus without complication, with long-term current use of insulin  SSI  Accu check Q6    Will wean off TPN today and consult Endo if BG persists over 200.          Pat Turner MD  Colorectal Surgery  [facility]

## 2020-08-01 NOTE — PROGRESS NOTES
Plan of care reviewed with patient and family who verbalized understanding.  Kayla used to communicate when needed.  AAO.  Ambulated in hallway with therapy.  Pt able to reposition self in bed independently, RN tried to offer a wedge for pt but pt refused.  Using PCA for pain, ETCO2 monitor on.  SCDs on.  Wound vac and ostomy intact.  Higginbotham intact.  NGT LIWS with green output.  Call bell remains within reach.  Bed in lowest position.  Frequent rounds made for pt safety.  Patient remains free of any new or additional falls/injury at this time. VSS, will continue to monitor.

## 2020-08-01 NOTE — ASSESSMENT & PLAN NOTE
Monitor BP  Hold home carvedilol while NG tube - will add IV labetalol if necessary. Not HTN this morning.

## 2020-08-01 NOTE — PLAN OF CARE
POC reviewed, pt verbalized understanding. VSS on RA. Pain managed with PCA Dilaudid. Voids per muir, adequate UOP overnight. Ileostomy intact and putting out bowel sweat. Pt NPO w/ complaints of nausea, treated with PRN meds. Pt slept between care. Frequent rounds made for safety, call light in reach. WCTM        Problem: Diabetes Comorbidity  Goal: Blood Glucose Level Within Desired Range  Outcome: Ongoing, Progressing     Problem: Wound  Goal: Optimal Wound Healing  Outcome: Ongoing, Progressing     Problem: Adult Inpatient Plan of Care  Goal: Plan of Care Review  Outcome: Ongoing, Progressing  Goal: Patient-Specific Goal (Individualization)  Outcome: Ongoing, Progressing  Goal: Absence of Hospital-Acquired Illness or Injury  Outcome: Ongoing, Progressing  Goal: Optimal Comfort and Wellbeing  Outcome: Ongoing, Progressing  Goal: Readiness for Transition of Care  Outcome: Ongoing, Progressing  Goal: Rounds/Family Conference  Outcome: Ongoing, Progressing     Problem: Diabetes Comorbidity  Goal: Blood Glucose Level Within Desired Range  Outcome: Ongoing, Progressing     Problem: Infection  Goal: Infection Symptom Resolution  Outcome: Ongoing, Progressing     Problem: Skin Injury Risk Increased  Goal: Skin Health and Integrity  Outcome: Ongoing, Progressing     Problem: Fall Injury Risk  Goal: Absence of Fall and Fall-Related Injury  Outcome: Ongoing, Progressing     Problem: Hypertension Comorbidity  Goal: Blood Pressure in Desired Range  Outcome: Ongoing, Progressing

## 2020-08-01 NOTE — ASSESSMENT & PLAN NOTE
NPO  Surgical intervention planned for today  Continue TPN  Marked for potential ostomies bilaterally  Plan for ureteral stent placement with procedure  Consent obtained with   Type and screen obtained

## 2020-08-01 NOTE — OP NOTE
[facility]  Operative Note    Date: 7/31/2020    Name: Azucena Morrison    MRN: [id number]    Pre-Op Diagnosis:  [place] enteric fistula, entero cutaneous fistula, partial bowel obstruction    Post-Op Diagnosis:  Same    Procedure(s) Performed:   1.  Low anterior resection with de-rotation of right colon and colorectal anastomosis  2.  Lysis of adhesions.  Please add 22 modifier, as this added approximately 8 hr to the duration of procedure  3.  Small-bowel resection with hand-sewn anastomosis  4.  Diverting ileostomy  5.  Flexible sigmoidoscopy  6.  Application of Prevena    Specimen(s): Small bowel, sigmoid colon, colon    Staff Surgeon: Fabiana Valiente    Assistant Surgeon: Dr Vigil, Dr Iyer, Dr Romero, Bethany Castro (fellow),     Anesthesia: General    Indications: Azucena Morrison is a 70 y.o. female with a complex prior surgical history who presented to me with coloenteric and enterocutaneous fistulae as well as symptomatic partial small-bowel obstructions.  She initially underwent surgery in [place] in 2015 for what sounds like perforated diverticulitis which resulted in a Aiyana procedure.  This resulted in an enterocutaneous fistula, which was treated with multiple surgical debridements.  Ultimately she was taken to the operating room in 2016 for takedown of this fistula and reversal of her colostomy.  This was complicated by recurrent enterocutaneous fistulae both to the midline wound and prior colostomy site.  She also developed a complex colo enteric fistula at the site of her colonic staple line.  She was again treated for her fistulae with multiple debridements.  Another exploration was attempted 1.5 years ago, but aborted because of description of a frozen abdomen.  She continued to return to the hospital for multiple readmissions from partial small-bowel obstructions and pain.  She was then transferred to our hospital for higher level of care.  She was decompressed with a nasogastric tube  in started on TPN.  After multiple discussions about the risk of possible operative reintervention, she ultimately decided to proceed with surgery for possible takedown of this fistula.  Please see my dictated notes for further details.    Details of procedure:  The patient was taken to the operating room and transferred to the OR table.  SCD boots were applied and IV antibiotics were administered.  She was placed under general endotracheal anesthesia.  She was then put into lithotomy stirrups.  The Urology team came into the operating room and performed a cystoscopy with placement of bilateral ureteral access catheters.  Please see their dictation for details of this procedure.  After this, she was placed into the split leg position.  Her abdomen was prepped and draped with Betadine.  After a time-out, we created a midline incision using her prior scar.  We were able to enter the upper abdomen relatively easily, and identified the stomach and transverse colon.  We sharply took down adhesions of the small bowel to the midline incision in order to open this down to the level of the fistula in the lower abdomen.  We then elected to divide the interloop adhesions and lateral adhesions until we had only the lower midline incision in left lower quadrant remaining.  Of note, lysis of adhesions in this patient added significant complexity to the procedure, and roughly 8 hr of additional operative time.  We were able to separate the adhesions from the right lower pelvis in order to have the mesentery of the small bowel completely mobilized posteriorly, so that it was only attached at the site of her fistula.      Once this was done we began to sharply take down the fistula from the anterior abdominal wall, which was quite densely adherent.  It became apparent that the colon, and likely the site of her prior colonic anastomosis, was passing through the middle of a complex matted loop of small bowel.  Once we had this freed  up from the abdominal wall we were able to identify the distal sigmoid colon and rectum.  We began to mobilize the rectum posteriorly in the TME plane, and also along the right and anterior sides.  Her uterus and ovaries were still in place.  We passed an EEA Sizer up through the anal canal to confirm that the rectum was straight, and that there was a soft area at the level of the sacral promontory that could be used for anastomosis.  At this point the remaining attachment of this matted loop of small bowel and colon was to the left sidewall.  We elected to separate the small bowel and proximal colon from the distal sigmoid colon in order to facilitate our dissection.  We attempted to palpate the ureteral stents.  The right stent was palpable in the correct position, but the left stent was not present.  We felt that this was most likely from some partial stenosis of the ureter at the site of this inflammation.  We were able to identify the ureter more proximally within the retroperitoneum, and trace this down to the area of dense adhesions.  We used sharp dissection to carefully take down the bowel from the retroperitoneum and left fallopian tube.  At this point we had the distal sigmoid colon completely mobilized.  We created a mesenteric window behind the soft upper rectum and divided this with a green load of the contour stapler.  The remaining mesentery was taken with the LigaSure.  This remnant sigmoid colon was then passed off for pathology.    We then turned our attention to the matted loop containing her colonic anastomosis.  We carefully  any adhesions that we could in order to preserve as much small bowel as possible.  We then measured will we anticipated would be the remaining usable bowel.  There was a length of 20 cm of normal bowel from the ileocecal valve to the fistula loop.  Coming out of the fistula low loop there was a 115 cm segment of non involved small bowel, which then passed back  into the fistula.  Proximal to the fistula there was approximately 60 cm of normal small bowel to the ligament of Treitz.  In total we estimated that we would be able to salvage 195 cm of small intestine, in addition to her ileocecal valve and colon.  We then gently  the colon as much as possible from this area and divided across the distal end with a blue load of the NICK stapler.  Moving the small bowel out of the way we assessed whether this colon would reach the rectum, which it would not.  We felt that we would need to perform a derotation of the right colon in order to perform a tension-free anastomosis.  We turned our attention to the right colon, taking down the lateral attachments and hepatic flexure.  We ensured that we had the right colon completely mobilized.  At this point the only point of attachment was the middle colic pedicle, which was taken with the ligature.  At this point, when performing the de rotation we felt that the colon would easily reach the rectum.  This was set aside.    We then turned our attention to the small bowel portion of the fistula.  We divided the to entry into exit loops of small bowel with blue loads of the NICK 75 stapler.  The intervening mesentery was taken with the LigaSure, and the specimen was passed off for pathology.  Given the long duration of the procedure, prior history of likely anastomotic leak, and moderate malnutrition of the patient, I felt that a diverting ostomy would be safer than multiple anastomoses.  Given that we only had 20 cm of normal ileum from the ileocecal valve to the 1st area of division, we elected to bring these 2 limbs up as a diverting ostomy.  We elected to perform a hand-sewn anastomosis of the more proximal divided ends given that the bowel appeared chronically dilated and mildly edematous.  A side to side, functional end-to-end, hand-sewn anastomosis was performed in 2 layers using Vicryl for Lembert sutures and PDS for the  inner layer.  The NICK staple lines of the anastomosis were Lemberted with Vicryl suture as well.  We then carefully ran the small intestine to times to examine it for any evidence of serosal injuries or missed enterotomies.  Once we were confident that we did not have any of these we turned our attention to the colonic anastomosis.    We cleared an area of mesentery just proximal to the colon staple line.  We gently clamped and flashed the marginal artery to ensure that we had a good blood supply.  We then excised the colon staple line and inserted the EEA 29 anvil using a pursestring of nylon suture.  We then turned our attention to the pelvis.  We confirmed that we had good hemostasis.  Dr. Vigil then went below and passed up the EEA stapler.  We mated the stapler and carefully examined the colon again to confirm that the mesentery was in the proper position.  The stapler was closed and fired.  We confirmed that we had 2 intact donuts.  We then filled the pelvis with warm saline and Dr. Vigil performed a flexible sigmoidoscopy to confirm that there was a visually intact staple line without any evidence of air leak.  The colonoscope was then withdrawn.  The staple line was oversewn with interrupted Vicryl sutures.    We then removed the wound protector.  We excised a [place] of skin at the previously marked ileostomy site.  We dissected through the subcutaneous fat and opened the fascia vertically.  The rectus muscle was spread and the posterior fascia was opened vertically and dilated to allow the passage of 2 fingers.  We attempted to bring up both the proximal and distal limb of ileum through this opening, which created some tension on the mesentery and retraction of the more distal limb.  We elected to tack the staple distal limb of ileum to the mesentery of the more proximal limb so that it was sitting above the fascia.  The limbs of bowel leading up to the ileostomy were wrapped with Seprafilm below the  fascia.    We then changed our gowns and gloves and brought clean closing instruments onto the field.  We excised the fistula track from the midline incision and cleaned up the fascial layers.  The fascia was then closed with running PDS suture in addition to interrupted Vicryl retention sutures.  The wound was irrigated with warm saline and the skin was closed with staples.  We then applied the Prevena.  We also excised the fistula tract from the colostomy site and packed this.  We then turned our attention to the ileostomy.  The staple line was excised and the ileostomy was matured in a Yanet fashion using Vicryl sutures.  The ileostomy appliance was then applied.  The procedure was then terminated.  All sponge instrument counts were correct.  I was present scrubbed for the entire procedure.  The patient was extubated and transferred to the recovery room in good condition.      Estimated Blood Loss: 800 mL     Drains/Implants:   Implant Name Type Inv. Item Serial No.  Lot No. LRB No. Used Action   MEMBRANE SEPRAFILM 5 X 6 - [id number]  MEMBRANE SEPRAFILM 5 X 6  GENZYME HARIKA [id number]  1 Implanted   MEMBRANE SEPRAFILM 5 X 6 - [id number]  MEMBRANE SEPRAFILM 5 X 6  GENZYME HARIKA [id number]  1 Implanted       Wound Class: III    Fbaiana Valiente

## 2020-08-01 NOTE — NURSING
Rounds made during bedside hand off, patient still showing location in the OR. Waiting for patient arrival

## 2020-08-01 NOTE — PROGRESS NOTES
[facility]  Colorectal Surgery  Progress Note    Patient Name: Azucena Morrison  MRN: [id number]  Admission Date: 7/15/2020  Hospital Length of Stay: 16 days  Attending Physician: Fabiana Valiente MD    Subjective:     Interval History: No acute event overnight  Pain controlled  No nausea/vomiting; NPO for procedure  Questions answered    Post-Op Info:  Procedure(s) (LRB):  RESECTION, COLON, LOW ANTERIOR, possible loop ileostomy, ERAS high (N/A)  CYSTOSCOPY (N/A)  CATHETERIZATION, URETER (Bilateral)  CREATION, ILEOSTOMY   Day of Surgery      Medications:  Continuous Infusions:   TPN ADULT CENTRAL LINE CUSTOM 37.5 mL/hr (07/31/20 1450)    TPN ADULT CENTRAL LINE CUSTOM       Scheduled Meds:   carvediloL  25 mg Oral Daily    enoxaparin  40 mg Subcutaneous Q24H    fat emulsion 20%  250 mL Intravenous Daily    sodium chloride 0.9%  10 mL Intravenous Q6H     PRN Meds:   benzocaine    cefTRIAXone (ROCEPHIN) IVPB    dextrose 50%    dextrose 50%    glucagon (human recombinant)    glucagon (human recombinant)    HYDROmorphone    insulin aspart U-100    metronidazole    ondansetron    oxyCODONE    oxyCODONE    promethazine (PHENERGAN) IVPB    sodium chloride 0.9%        Objective:     Vital Signs (Most Recent):  Temp: 98.2 °F (36.8 °C) (07/31/20 0815)  Pulse: 91 (07/31/20 0815)  Resp: 20 (07/31/20 0815)  BP: 129/84 (07/31/20 0815)  SpO2: 97 % (07/31/20 0815) Vital Signs (24h Range):  Temp:  [97.1 °F (36.2 °C)-98.7 °F (37.1 °C)] 98.2 °F (36.8 °C)  Pulse:  [81-91] 91  Resp:  [17-20] 20  SpO2:  [97 %-98 %] 97 %  BP: (125-138)/(67-84) 129/84     Intake/Output - Last 3 Shifts       07/29 0700 - 07/30 0659 07/30 0700 - 07/31 0659 07/31 0700 - 08/01 0659    P.O.       I.V. (mL/kg)   4950 (70)    Blood   350    TPN       Total Intake(mL/kg)   5300 (75)    Urine (mL/kg/hr)  700 (0.4) 1670 (1.9)    Emesis/NG output 500  100    Other       Stool  0     Blood   800    Total Output      Net -500 -700 +2730           Urine Occurrence 2 x 5 x     Stool Occurrence  1 x           Physical Exam  Constitutional:       General: She is not in acute distress.  HENT:      Head: Normocephalic.   Eyes:      Conjunctiva/sclera: Conjunctivae normal.   Neck:      Trachea: No tracheal deviation.   Cardiovascular:      Rate and Rhythm: Regular rhythm.   Pulmonary:      Effort: Pulmonary effort is normal. No respiratory distress.   Abdominal:      Comments: Soft, nondistended  Dressing intact of enterocutaneous and colocutaneous fistulas.   Neurological:      Mental Status: She is alert and oriented to person, place, and time.         Significant Labs:  BMP (Last 3 Results):   Recent Labs   Lab 07/29/20  0500 07/30/20  0400 07/31/20  0515   *  163* 174*  174* 152*  152*   *  135* 136  136 135*  135*   K 4.1  4.1 4.1  4.1 4.1  4.1     102 100  100 100  100   CO2 28  28 28  28 29  29   BUN 14  14 15  15 15  15   CREATININE 1.0  1.0 1.0  1.0 0.9  0.9   CALCIUM 8.4*  8.4* 9.1  9.1 8.8  8.8   MG 1.7 1.9 1.8     CBC (Last 3 Results):   Recent Labs   Lab 07/29/20  0500 07/30/20  0400 07/31/20  0515   WBC 6.90 6.89 5.84   RBC 3.43* 3.58* 3.59*   HGB 8.0* 8.4* 8.3*   HCT 25.9* 26.8* 27.5*    349 325   MCV 76* 75* 77*   MCH 23.3* 23.5* 23.1*   MCHC 30.9* 31.3* 30.2*           Assessment/Plan:     * Cutaneous fistula  NPO  Surgical intervention planned for today  Continue TPN  Marked for potential ostomies bilaterally  Plan for ureteral stent placement with procedure  Consent obtained with   Type and screen obtained    Chronic kidney disease, stage 3  Monitor labs  Monitor I&O    Generalized abdominal pain  Continue IV pain meds  Begin to wean pain meds    Essential hypertension  Monitor BP  Home meds    Type 2 diabetes mellitus without complication, with long-term current use of insulin  SSI  Accu check Q6          Shyam Aguila MD  Colorectal  Surgery  [facility]

## 2020-08-01 NOTE — NURSING TRANSFER
Nursing Transfer Note      7/31/2020     Transfer From: PACU     Transfer via bed    Transfer with PCA Dilaudid, IV fluids    Transported by Transport    Medicines sent: PCA Dilaudid    Chart send with patient: Yes      Patient reassessed at: 7/31/2020 11.18 pm    Upon arrival to floor: patient oriented to room, call bell in reach and bed in lowest position

## 2020-08-01 NOTE — PT/OT/SLP EVAL
Occupational Therapy   Evaluation    Name: Azucena Morrison  MRN: [id number]  Admitting Diagnosis:  Cutaneous fistula 1 Day Post-Op    Recommendations:     Discharge Recommendations:  Home Health OT  Discharge Equipment Recommendations:   (TBD)  Barriers to discharge:  None    Assessment:     Azucena Morrison is a 70 y.o. female with a medical diagnosis of Cutaneous fistula.  She presents with a decline in occupational performance. Pt limited by pain and decreased endurance this date. She required cues for safety and demonstrated impulsivity.  Performance deficits affecting function: weakness, impaired endurance, impaired self care skills, impaired functional mobilty, gait instability, impaired balance, decreased safety awareness, pain, impaired cardiopulmonary response to activity.  Pt would benefit from skilled OT services in order to maximize independence with ADLs and facilitate safe discharge. Pt would benefit from home health OT once medically stable for discharge.     Rehab Prognosis: Good; patient would benefit from acute skilled OT services to address these deficits and reach maximum level of function.       Plan:     Patient to be seen 3 x/week to address the above listed problems via self-care/home management, therapeutic activities, therapeutic exercises  · Plan of Care Expires: 08/31/20  · Plan of Care Reviewed with: patient    Subjective     Chief Complaint: abdominal pain  Patient/Family Comments/goals: to return home    Occupational Profile:  Living Environment: Pt lives with her son and daughter-in-law in a [facility] with 4 VIV  Previous level of function: independent  Roles and Routines: mother  Equipment Used at Home:  none  Assistance upon Discharge: Upon discharge, pt will have assistance from family    Pain/Comfort:  Pain Rating 1: 10/10  Location 1: abdomen    Patients cultural, spiritual, Oriental orthodox conflicts given the current situation:      Objective:     Communicated with: RN and PT prior to  session.  Patient found HOB elevated with muir catheter, PCA, peripheral IV, PICC line, wound vac upon OT entry to room.    General Precautions: Standard,     Orthopedic Precautions:    Braces:       Occupational Performance:    Bed Mobility:    · Patient completed Rolling/Turning to Right with moderate assistance  · Patient completed Scooting/Bridging with moderate assistance  · Patient completed Supine to Sit with moderate assistance    Functional Mobility/Transfers:  · Patient completed Sit <> Stand Transfer with contact guard assistance  with  hand-held assist   · Patient completed Bed <> Chair Transfer using Step Transfer technique with contact guard assistance with rolling walker  · Functional Mobility: Pt ambulated within hospital room with CGA in order to maximize functional activity tolerance and standing balance required for engagement in occupations of choice. Pt with increased respiratory rate and requested to return seated.      Activities of Daily Living:  · Bathing: contact guard assistance to bathe with bath wipes seated EOB and in standing  · Upper Body Dressing: moderate assistance to doff/don gowns   · Lower Body Dressing: max A to don B socks     Cognitive/Visual Perceptual:  Cognitive/Psychosocial Skills:     -       Oriented to: Person, Place, Time and Situation   -       Follows Commands/attention:Easily distracted  -       Communication: clear/fluent  -       Memory: No Deficits noted  -       Safety awareness/insight to disability: impaired   -       Mood/Affect/Coping skills/emotional control: Appropriate to situation    Physical Exam:  Upper Extremity Range of Motion:     -       Right Upper Extremity: WFL  -       Left Upper Extremity: WFL  Upper Extremity Strength:    -       Right Upper Extremity: WFL  -       Left Upper Extremity: WFL    AMPAC 6 Click ADL:  AMPAC Total Score: 19    Treatment & Education:  -Therapist provided facilitation and instruction of proper body mechanics,  energy conservation, and fall prevention strategies during tasks listed above.  -Pt returned supine at conclusion of session per RN request for NG tube placement  -Pt educated on role of OT, POC and goals for therapy  -Pt educated on importance of OOB activities with staff member assistance and sitting OOB majority of the day.   -Pt verbalized understanding. Pt expressed no further concerns/questions  -Whiteboard updated   Education:    Patient left HOB elevated with all lines intact, call button in reach and RN notified    GOALS:   Multidisciplinary Problems     Occupational Therapy Goals     Not on file                History:     Past Medical History:   Diagnosis Date    Chronic kidney disease, stage 3     Diabetes mellitus     Diabetes mellitus, type 2     Hypertension        Past Surgical History:   Procedure Laterality Date     SECTION, CLASSIC      x2    CHOLECYSTECTOMY      [facility]      [facility]    COLONOSCOPY N/A 2018    Procedure: COLONOSCOPY;  Surgeon: Gibran Aguayo MD;  [facility];  Service: Endoscopy;  Laterality: N/A;    COLOSTOMY Left     CYSTOSCOPY WITH URETEROSCOPY, RETROGRADE PYELOGRAPHY, AND INSERTION OF STENT Left 2019    Procedure: CYSTOSCOPY, WITH RETROGRADE PYELOGRAM AND URETERAL STENT INSERTION with placement of a muir cath;  Surgeon: Ulisses Horton MD;  Location: [facility] OR;  Service: General;  Laterality: Left;    INCISION AND DRAINAGE OF ABSCESS N/A 2019    Procedure: INCISION AND DRAINAGE, ABSCESS;  Surgeon: Ulisses Horton MD;  Location: [facility] OR;  Service: General;  Laterality: N/A;    INCISION OF ABDOMINAL WALL N/A 8/10/2018    Procedure: INCISION, ABDOMINAL WALL;  Surgeon: Ulisses Horton MD;  Location: [facility] OR;  Service: General;  Laterality: N/A;    INCISIONAL HERNIA REPAIR Right        Time Tracking:     OT Date of Treatment: 20  OT Start Time: 939  OT Stop Time: 1008  OT Total Time (min): 29  min    Billable Minutes:Evaluation 19  Self Care/Home Management 10    Trinh Loyola OT  8/1/2020

## 2020-08-01 NOTE — PLAN OF CARE
Problem: Physical Therapy Goal  Goal: Physical Therapy Goal  Description: Goals to be met by: 20     Patient will increase functional independence with mobility by performin. Supine to sit with Contact Guard Assistance.  2. Sit to supine with Modified [place].  3. Sit to stand transfer with Modified [place].  4. Bed to chair transfer with Stand-by Assistance using Rolling Walker PRN.  5. Gait  x 100 feet with Stand-by Assistance using Rolling Walker PRN.    6. Ascend/descend 4 stair with bilateral Handrails Stand-by Assistance.   7. Lower extremity exercise program x 20 reps per handout, with assistance as needed.    Outcome: Ongoing, Progressing     PT goals established.

## 2020-08-01 NOTE — PROGRESS NOTES
Patient removed NG tube by accident. Verbal order from MD Callier for patient to remain without NG at this time. Verbal orders to contact MD if any progressive unresolved nausea.

## 2020-08-01 NOTE — SUBJECTIVE & OBJECTIVE
Subjective:     Interval History: No acute event overnight  Pain controlled  No nausea/vomiting; NPO for procedure  Questions answered    Post-Op Info:  Procedure(s) (LRB):  RESECTION, COLON, LOW ANTERIOR, possible loop ileostomy, ERAS high (N/A)  CYSTOSCOPY (N/A)  CATHETERIZATION, URETER (Bilateral)  CREATION, ILEOSTOMY   Day of Surgery      Medications:  Continuous Infusions:   TPN ADULT CENTRAL LINE CUSTOM 37.5 mL/hr (07/31/20 1450)    TPN ADULT CENTRAL LINE CUSTOM       Scheduled Meds:   carvediloL  25 mg Oral Daily    enoxaparin  40 mg Subcutaneous Q24H    fat emulsion 20%  250 mL Intravenous Daily    sodium chloride 0.9%  10 mL Intravenous Q6H     PRN Meds:   benzocaine    cefTRIAXone (ROCEPHIN) IVPB    dextrose 50%    dextrose 50%    glucagon (human recombinant)    glucagon (human recombinant)    HYDROmorphone    insulin aspart U-100    metronidazole    ondansetron    oxyCODONE    oxyCODONE    promethazine (PHENERGAN) IVPB    sodium chloride 0.9%        Objective:     Vital Signs (Most Recent):  Temp: 98.2 °F (36.8 °C) (07/31/20 0815)  Pulse: 91 (07/31/20 0815)  Resp: 20 (07/31/20 0815)  BP: 129/84 (07/31/20 0815)  SpO2: 97 % (07/31/20 0815) Vital Signs (24h Range):  Temp:  [97.1 °F (36.2 °C)-98.7 °F (37.1 °C)] 98.2 °F (36.8 °C)  Pulse:  [81-91] 91  Resp:  [17-20] 20  SpO2:  [97 %-98 %] 97 %  BP: (125-138)/(67-84) 129/84     Intake/Output - Last 3 Shifts       07/29 0700 - 07/30 0659 07/30 0700 - 07/31 0659 07/31 0700 - 08/01 0659    P.O.       I.V. (mL/kg)   4950 (70)    Blood   350    TPN       Total Intake(mL/kg)   5300 (75)    Urine (mL/kg/hr)  700 (0.4) 1670 (1.9)    Emesis/NG output 500  100    Other       Stool  0     Blood   800    Total Output     Net -500 -700 +2730           Urine Occurrence 2 x 5 x     Stool Occurrence  1 x           Physical Exam  Constitutional:       General: She is not in acute distress.  HENT:      Head: Normocephalic.   Eyes:       Conjunctiva/sclera: Conjunctivae normal.   Neck:      Trachea: No tracheal deviation.   Cardiovascular:      Rate and Rhythm: Regular rhythm.   Pulmonary:      Effort: Pulmonary effort is normal. No respiratory distress.   Abdominal:      Comments: Soft, nondistended  Dressing intact of enterocutaneous and colocutaneous fistulas.   Neurological:      Mental Status: She is alert and oriented to person, place, and time.         Significant Labs:  BMP (Last 3 Results):   Recent Labs   Lab 07/29/20  0500 07/30/20  0400 07/31/20  0515   *  163* 174*  174* 152*  152*   *  135* 136  136 135*  135*   K 4.1  4.1 4.1  4.1 4.1  4.1     102 100  100 100  100   CO2 28  28 28  28 29  29   BUN 14  14 15  15 15  15   CREATININE 1.0  1.0 1.0  1.0 0.9  0.9   CALCIUM 8.4*  8.4* 9.1  9.1 8.8  8.8   MG 1.7 1.9 1.8     CBC (Last 3 Results):   Recent Labs   Lab 07/29/20  0500 07/30/20  0400 07/31/20  0515   WBC 6.90 6.89 5.84   RBC 3.43* 3.58* 3.59*   HGB 8.0* 8.4* 8.3*   HCT 25.9* 26.8* 27.5*    349 325   MCV 76* 75* 77*   MCH 23.3* 23.5* 23.1*   MCHC 30.9* 31.3* 30.2*

## 2020-08-01 NOTE — TRANSFER OF CARE
"Anesthesia Transfer of Care Note    Patient: Azucena Morrison    Procedure(s) Performed: Procedure(s) (LRB):  RESECTION, COLON, LOW ANTERIOR (N/A)  CYSTOSCOPY (N/A)  CATHETERIZATION, URETER (Bilateral)  CREATION, ILEOSTOMY  LYSIS, ADHESIONS    Patient location: PACU    Anesthesia Type: general    Transport from OR: Transported from OR on 6-10 L/min O2 by face mask with adequate spontaneous ventilation    Post pain: adequate analgesia    Post assessment: no apparent anesthetic complications and tolerated procedure well    Post vital signs: stable    Level of consciousness: responds to stimulation (voice)    Nausea/Vomiting: no nausea/vomiting    Complications: none    Transfer of care protocol was followed      Last vitals:   Visit Vitals  BP (!) 146/80 (BP Location: Right arm, Patient Position: Lying)   Pulse 97   Temp 36.6 °C (97.9 °F) (Temporal)   Resp (!) 21   Ht 5' 1" (1.549 m)   Wt 70.7 kg (155 lb 13.8 oz)   LMP  (LMP Unknown)   SpO2 100%   Breastfeeding No   BMI 29.45 kg/m²     "

## 2020-08-01 NOTE — ASSESSMENT & PLAN NOTE
70 y.o. female 1 Day Post-Op for Procedure(s) (LRB):  RESECTION, COLON, LOW ANTERIOR (N/A)  CYSTOSCOPY (N/A)  CATHETERIZATION, URETER (Bilateral)  CREATION, ILEOSTOMY  LYSIS, ADHESIONS    NPO  IVF  NG tube to LIWS  Wean off TPN  Keep muir  IS  OOB  PT/OT    Continue communication with use of

## 2020-08-01 NOTE — SUBJECTIVE & OBJECTIVE
Subjective:     Interval History:   Issues with pain overnight, perhaps not understanding PCA. Tachycardic. This morning seems better. NG pulled out overnight. Nauseated. Higginbotham in. Not OOB yet. TPN infusing.    Post-Op Info:  Procedure(s) (LRB):  RESECTION, COLON, LOW ANTERIOR (N/A)  CYSTOSCOPY (N/A)  CATHETERIZATION, URETER (Bilateral)  CREATION, ILEOSTOMY  LYSIS, ADHESIONS   1 Day Post-Op      Medications:  Continuous Infusions:   sodium chloride 0.9% 50 mL/hr at 08/01/20 1115    hydromorphone in 0.9 % NaCl 6 mg/30 ml      TPN ADULT CENTRAL LINE CUSTOM       Scheduled Meds:   acetaminophen  1,000 mg Intravenous Q8H    Followed by    acetaminophen  1,000 mg Oral Q8H    carvediloL  25 mg Oral Daily    enoxaparin  40 mg Subcutaneous Q24H    ibuprofen  800 mg Intravenous Q8H    Followed by    ibuprofen  800 mg Oral Q8H    lorazepam  0.5 mg Intravenous Once    mupirocin   Nasal BID    sodium chloride 0.9%  10 mL Intravenous Q6H     PRN Meds:   dextrose 50%    glucagon (human recombinant)    insulin aspart U-100    naloxone    ondansetron    ondansetron    oxyCODONE    oxyCODONE    promethazine (PHENERGAN) IVPB    sodium chloride 0.9%    sodium chloride 0.9%    traMADoL        Objective:     Vital Signs (Most Recent):  Temp: 98.4 °F (36.9 °C) (08/01/20 0758)  Pulse: 95 (08/01/20 0758)  Resp: 13 (08/01/20 1156)  BP: (!) 114/55 (08/01/20 0758)  SpO2: 96 % (08/01/20 0758) Vital Signs (24h Range):  Temp:  [96.2 °F (35.7 °C)-98.6 °F (37 °C)] 98.4 °F (36.9 °C)  Pulse:  [] 95  Resp:  [12-21] 13  SpO2:  [84 %-100 %] 96 %  BP: (114-172)/(55-93) 114/55  Arterial Line BP: (183-186)/(80-87) 186/87     Intake/Output - Last 3 Shifts       07/30 0700 - 07/31 0659 07/31 0700 - 08/01 0659 08/01 0700 - 08/02 0659    P.O.  0     I.V. (mL/kg)  6386.7 (90.3)     Blood  350     IV Piggyback  950     TPN  1617.5     Total Intake(mL/kg)  9304.2 (131.6)     Urine (mL/kg/hr) 700 (0.4) 2605 (1.5) 250 (0.6)     Emesis/NG output  100     Drains   200    Other  0     Stool 0 0     Blood  800     Total Output 700 3505 450    Net -700 +5799.2 -450           Urine Occurrence 5 x      Stool Occurrence 1 x 0 x           Physical Exam  Constitutional:       General: She is not in acute distress.  HENT:      Head: Normocephalic.   Eyes:      Conjunctiva/sclera: Conjunctivae normal.   Neck:      Trachea: No tracheal deviation.   Cardiovascular:      Rate and Rhythm: Regular rhythm.   Pulmonary:      Effort: Pulmonary effort is normal. No respiratory distress.   Abdominal:      Comments: Provena in place.   Appliance on ileostomy; stoma pink. Bowel sweat in bag.  Neurological:      Mental Status: She is alert and oriented to person, place, and time.         Significant Labs:  BMP (Last 3 Results):   Recent Labs   Lab 07/31/20 0515 07/31/20 2031 08/01/20  0420   *  152* 190* 295*  295*  295*   *  135* 136 133*  133*  133*   K 4.1  4.1 4.3 4.0  4.0  4.0     100 107 104  104  104   CO2 29  29 21* 22*  22*  22*   BUN 15  15 16 19  19  19   CREATININE 0.9  0.9 0.8 0.9  0.9  0.9   CALCIUM 8.8  8.8 7.8* 7.5*  7.5*  7.5*   MG 1.8 1.7 1.6  1.6     CBC (Last 3 Results):   Recent Labs   Lab 07/31/20 0515 07/31/20 2031 08/01/20  0420   WBC 5.84 7.87 15.39*  15.39*   RBC 3.59* 3.46* 3.50*  3.50*   HGB 8.3* 8.5* 8.6*  8.6*   HCT 27.5* 26.8* 27.0*  27.0*    294 316  316   MCV 77* 78* 77*  77*   MCH 23.1* 24.6* 24.6*  24.6*   MCHC 30.2* 31.7* 31.9*  31.9*

## 2020-08-01 NOTE — ANESTHESIA POSTPROCEDURE EVALUATION
Anesthesia Post Evaluation    Patient: Azucena MENDOZA Morrison    Procedure(s) Performed: Procedure(s) (LRB):  RESECTION, COLON, LOW ANTERIOR (N/A)  CYSTOSCOPY (N/A)  CATHETERIZATION, URETER (Bilateral)  CREATION, ILEOSTOMY  LYSIS, ADHESIONS    Final Anesthesia Type: general    Patient location during evaluation: PACU  Patient participation: Yes- Able to Participate  Level of consciousness: awake and alert  Post-procedure vital signs: reviewed and stable  Pain management: adequate  Airway patency: patent    PONV status at discharge: No PONV  Anesthetic complications: no      Cardiovascular status: stable  Respiratory status: spontaneous ventilation and room air  Hydration status: euvolemic  Follow-up not needed.          Vitals Value Taken Time   /85 07/31/20 2031   Temp  07/31/20 2038   Pulse 96 07/31/20 2038   Resp 18 07/31/20 2038   SpO2 100 % 07/31/20 2038   Vitals shown include unvalidated device data.      No case tracking events are documented in the log.      Pain/Patience Score: Pain Rating Prior to Med Admin: 10 (7/31/2020  5:25 AM)  Patience Score: 5 (7/31/2020  8:19 PM)

## 2020-08-01 NOTE — PT/OT/SLP EVAL
Physical Therapy Evaluation    Patient Name:  Azucena Morrison   MRN:  [id number]    Recommendations:     Discharge Recommendations:  home health PT(Simultaneous filing. User may not have seen previous data.)   Discharge Equipment Recommendations: (possible need for RW - determine upon re-assessment of gait Simultaneous filing. User may not have seen previous data.)   Barriers to discharge: None    Assessment:     Azucena Morrison is a 70 y.o. female admitted with a medical diagnosis of Cutaneous fistula. Pt is s/p resection of the lower anterior colon, s of 7/31/20.    Upon evaluation, pt requires CGA-Isabella of 1 person to amb short distances, without use of AD for support.  May benefit from AD prescription.  Primarily inhibited by tachypnea most likely associated with pain.  No history of falls.  She presents with the following impairments/functional limitations:  weakness, impaired endurance, impaired self care skills, impaired functional mobilty, gait instability, impaired balance, decreased safety awareness, pain, impaired cardiopulmonary response to activity(Simultaneous filing. User may not have seen previous data.).    Rehab Prognosis: Good; patient would benefit from acute skilled PT services to address these deficits and reach maximum level of function.    Recent Surgery: Procedure(s) (LRB):  RESECTION, COLON, LOW ANTERIOR (N/A)  CYSTOSCOPY (N/A)  CATHETERIZATION, URETER (Bilateral)  CREATION, ILEOSTOMY  LYSIS, ADHESIONS 1 Day Post-Op    Plan:     During this hospitalization, patient to be seen 3 x/week to address the identified rehab impairments via gait training, therapeutic activities, therapeutic exercises, neuromuscular re-education and progress toward the following goals:    · Plan of Care Expires:  08/29/20    Subjective     Chief Complaint: Abdominal pain  Patient/Family Comments/goals: Pt stated that she needed to turn around during gait training  Pain/Comfort:  · Pain Rating 1: 10/10(Simultaneous  filing. User may not have seen previous data.)  · Location 1: abdomen(Simultaneous filing. User may not have seen previous data.)  · Pain Addressed 1: Pre-medicate for activity, Nurse notified, Cessation of Activity(Simultaneous filing. User may not have seen previous data.)    Patients cultural, spiritual, Anabaptist conflicts given the current situation: no(Simultaneous filing. User may not have seen previous data.)    Living Environment:  Pt lives with son and daughter, SSH, 4 VIV.  HRs unknown.   Prior to admission, patients level of function was I with all functional mobility and ADLs.  Equipment used at home: none(Simultaneous filing. User may not have seen previous data.).  DME owned (not currently used): none.  Upon discharge, patient will have assistance from children.    Objective:     Communicated with nursing prior to session.  Patient found supine with colostomy, muir catheter, peripheral IV, PICC line, telemetry, wound vac(Simultaneous filing. User may not have seen previous data.)  upon PT entry to room.    General Precautions: Standard, fall, NPO(Simultaneous filing. User may not have seen previous data.)   Orthopedic Precautions:N/A, Full weight bearing(Simultaneous filing. User may not have seen previous data.)   Braces: N/A(Simultaneous filing. User may not have seen previous data.)     Exams:  · Cognitive Exam:  Patient is oriented to Person, Place, Time and Situation  · Gross Motor Coordination:  WFL  · Postural Exam:  Patient presented with the following abnormalities:    · -       Rounded shoulders  · Skin Integrity/Edema:      · -       Edema: None noted B LEs  · RUE ROM: WFL  · RUE Strength: WFL  · LUE ROM: WFL  · LUE Strength: WFL  · RLE ROM: WFL  · RLE Strength: WFL  · LLE ROM: WFL  · LLE Strength: WFL    Functional Mobility:  · Bed Mobility:     · Scooting: stand by assistance  · Supine to Sit: Isabella: to facilitate trunk elevation, with use of bed rail  · Sit to Supine:  supervision  · Transfers:     · Sit to Stand:  S-CGA from bed and low recliner chair with appropriate reach back prior to sitting  · Bed to Chair: contact guard assistance with  no AD  using  Stand Pivot  · Gait: Pt amb 40' CGA-Isabella, without AD, discontinued sec to inc RR, at pts request and PT concern for safety.  Pt amb 16', CGA, without AD, A with lines  · Balance: CGA-Isabella: dynamic standing balance without AD    Therapeutic Activities and Exercises:   Whiteboard updated    AM-PAC 6 CLICK MOBILITY  Total Score:16     Patient left supine at nurse's request with all lines intact, call button in reach and nursing notified.    GOALS:   Multidisciplinary Problems     Physical Therapy Goals        Problem: Physical Therapy Goal    Goal Priority Disciplines Outcome Goal Variances Interventions   Physical Therapy Goal     PT, PT/OT Ongoing, Progressing     Description: Goals to be met by: 20     Patient will increase functional independence with mobility by performin. Supine to sit with Contact Guard Assistance.  2. Sit to supine with Modified Swift.  3. Sit to stand transfer with Modified Swift.  4. Bed to chair transfer with Stand-by Assistance using Rolling Walker PRN.  5. Gait  x 100 feet with Stand-by Assistance using Rolling Walker PRN.    6. Ascend/descend 4 stair with bilateral Handrails Stand-by Assistance.   7. Lower extremity exercise program x 20 reps per handout, with assistance as needed.                     History:     Past Medical History:   Diagnosis Date    Chronic kidney disease, stage 3     Diabetes mellitus     Diabetes mellitus, type 2     Hypertension        Past Surgical History:   Procedure Laterality Date     SECTION, CLASSIC      x2    CHOLECYSTECTOMY      COLON SURGERY      \[facility]\""    COLONOSCOPY N/A 2018    Procedure: COLONOSCOPY;  Surgeon: Gibran Aguayo MD;  Location: [facility];  Service: Endoscopy;  Laterality: N/A;    COLOSTOMY  Left 2015    CYSTOSCOPY WITH URETEROSCOPY, RETROGRADE PYELOGRAPHY, AND INSERTION OF STENT Left 2/22/2019    Procedure: CYSTOSCOPY, WITH RETROGRADE PYELOGRAM AND URETERAL STENT INSERTION with placement of a muir cath;  Surgeon: Ulisses Horton MD;  Location: [facility] OR;  Service: General;  Laterality: Left;    INCISION AND DRAINAGE OF ABSCESS N/A 12/22/2019    Procedure: INCISION AND DRAINAGE, ABSCESS;  Surgeon: Ulisses Horton MD;  Location: [facility] OR;  Service: General;  Laterality: N/A;    INCISION OF ABDOMINAL WALL N/A 8/10/2018    Procedure: INCISION, ABDOMINAL WALL;  Surgeon: Ulisses Horton MD;  Location: [facility] OR;  Service: General;  Laterality: N/A;    INCISIONAL HERNIA REPAIR Right 2010       Time Tracking:     PT Received On: 08/01/20  PT Start Time: 0939     PT Stop Time: 1008  PT Total Time (min): 29 min     Billable Minutes: Evaluation 8 and Gait Training 10      Leonela Bernabe, PT  08/01/2020

## 2020-08-01 NOTE — NURSING TRANSFER
Nursing Transfer Note      7/31/2020     Transfer to room 1022    Transfer via bed    Transfer with iv fluids, dilaudid pca    Transported by transport    Medicines sent: dilaudid, tpn, iv fluids    Chart send with patient: yes    Notified:family    Patient reassessed at: 2200

## 2020-08-02 LAB
ALBUMIN SERPL BCP-MCNC: 1.7 G/DL (ref 3.5–5.2)
ALP SERPL-CCNC: 54 U/L (ref 55–135)
ALT SERPL W/O P-5'-P-CCNC: 14 U/L (ref 10–44)
ANION GAP SERPL CALC-SCNC: 7 MMOL/L (ref 8–16)
ANISOCYTOSIS BLD QL SMEAR: SLIGHT
ANISOCYTOSIS BLD QL SMEAR: SLIGHT
AST SERPL-CCNC: 19 U/L (ref 10–40)
BASOPHILS # BLD AUTO: 0.04 K/UL (ref 0–0.2)
BASOPHILS # BLD AUTO: 0.05 K/UL (ref 0–0.2)
BASOPHILS NFR BLD: 0.2 % (ref 0–1.9)
BASOPHILS NFR BLD: 0.3 % (ref 0–1.9)
BILIRUB SERPL-MCNC: 0.6 MG/DL (ref 0.1–1)
BUN SERPL-MCNC: 22 MG/DL (ref 8–23)
BURR CELLS BLD QL SMEAR: ABNORMAL
BURR CELLS BLD QL SMEAR: ABNORMAL
CALCIUM SERPL-MCNC: 8 MG/DL (ref 8.7–10.5)
CHLORIDE SERPL-SCNC: 106 MMOL/L (ref 95–110)
CO2 SERPL-SCNC: 23 MMOL/L (ref 23–29)
CREAT SERPL-MCNC: 0.9 MG/DL (ref 0.5–1.4)
DIFFERENTIAL METHOD: ABNORMAL
DIFFERENTIAL METHOD: ABNORMAL
EOSINOPHIL # BLD AUTO: 0.4 K/UL (ref 0–0.5)
EOSINOPHIL # BLD AUTO: 0.4 K/UL (ref 0–0.5)
EOSINOPHIL NFR BLD: 2.1 % (ref 0–8)
EOSINOPHIL NFR BLD: 2.4 % (ref 0–8)
ERYTHROCYTE [DISTWIDTH] IN BLOOD BY AUTOMATED COUNT: 17.2 % (ref 11.5–14.5)
ERYTHROCYTE [DISTWIDTH] IN BLOOD BY AUTOMATED COUNT: 17.6 % (ref 11.5–14.5)
EST. GFR  (AFRICAN AMERICAN): >60 ML/MIN/1.73 M^2
EST. GFR  (NON AFRICAN AMERICAN): >60 ML/MIN/1.73 M^2
GLUCOSE SERPL-MCNC: 102 MG/DL (ref 70–110)
HCT VFR BLD AUTO: 21.3 % (ref 37–48.5)
HCT VFR BLD AUTO: 23.8 % (ref 37–48.5)
HGB BLD-MCNC: 6.6 G/DL (ref 12–16)
HGB BLD-MCNC: 7.5 G/DL (ref 12–16)
HYPOCHROMIA BLD QL SMEAR: ABNORMAL
HYPOCHROMIA BLD QL SMEAR: ABNORMAL
IMM GRANULOCYTES # BLD AUTO: 0.12 K/UL (ref 0–0.04)
IMM GRANULOCYTES # BLD AUTO: 0.31 K/UL (ref 0–0.04)
IMM GRANULOCYTES NFR BLD AUTO: 0.7 % (ref 0–0.5)
IMM GRANULOCYTES NFR BLD AUTO: 1.7 % (ref 0–0.5)
LYMPHOCYTES # BLD AUTO: 0.9 K/UL (ref 1–4.8)
LYMPHOCYTES # BLD AUTO: 1.2 K/UL (ref 1–4.8)
LYMPHOCYTES NFR BLD: 5.2 % (ref 18–48)
LYMPHOCYTES NFR BLD: 6.6 % (ref 18–48)
MAGNESIUM SERPL-MCNC: 2.3 MG/DL (ref 1.6–2.6)
MCH RBC QN AUTO: 24.4 PG (ref 27–31)
MCH RBC QN AUTO: 24.5 PG (ref 27–31)
MCHC RBC AUTO-ENTMCNC: 31 G/DL (ref 32–36)
MCHC RBC AUTO-ENTMCNC: 31.5 G/DL (ref 32–36)
MCV RBC AUTO: 78 FL (ref 82–98)
MCV RBC AUTO: 79 FL (ref 82–98)
MONOCYTES # BLD AUTO: 0.5 K/UL (ref 0.3–1)
MONOCYTES # BLD AUTO: 0.6 K/UL (ref 0.3–1)
MONOCYTES NFR BLD: 2.7 % (ref 4–15)
MONOCYTES NFR BLD: 3.2 % (ref 4–15)
NEUTROPHILS # BLD AUTO: 15.1 K/UL (ref 1.8–7.7)
NEUTROPHILS # BLD AUTO: 15.9 K/UL (ref 1.8–7.7)
NEUTROPHILS NFR BLD: 85.8 % (ref 38–73)
NEUTROPHILS NFR BLD: 89.1 % (ref 38–73)
NRBC BLD-RTO: 0 /100 WBC
NRBC BLD-RTO: 0 /100 WBC
PHOSPHATE SERPL-MCNC: 4.6 MG/DL (ref 2.7–4.5)
PLATELET # BLD AUTO: 271 K/UL (ref 150–350)
PLATELET # BLD AUTO: 295 K/UL (ref 150–350)
PLATELET BLD QL SMEAR: ABNORMAL
PLATELET BLD QL SMEAR: ABNORMAL
PMV BLD AUTO: 8.7 FL (ref 9.2–12.9)
PMV BLD AUTO: 9.3 FL (ref 9.2–12.9)
POCT GLUCOSE: 101 MG/DL (ref 70–110)
POCT GLUCOSE: 104 MG/DL (ref 70–110)
POCT GLUCOSE: 116 MG/DL (ref 70–110)
POCT GLUCOSE: 185 MG/DL (ref 70–110)
POIKILOCYTOSIS BLD QL SMEAR: SLIGHT
POIKILOCYTOSIS BLD QL SMEAR: SLIGHT
POTASSIUM SERPL-SCNC: 4.2 MMOL/L (ref 3.5–5.1)
PROT SERPL-MCNC: 5 G/DL (ref 6–8.4)
RBC # BLD AUTO: 2.7 M/UL (ref 4–5.4)
RBC # BLD AUTO: 3.06 M/UL (ref 4–5.4)
SODIUM SERPL-SCNC: 136 MMOL/L (ref 136–145)
WBC # BLD AUTO: 16.89 K/UL (ref 3.9–12.7)
WBC # BLD AUTO: 18.47 K/UL (ref 3.9–12.7)

## 2020-08-02 PROCEDURE — 63600175 PHARM REV CODE 636 W HCPCS: Performed by: STUDENT IN AN ORGANIZED HEALTH CARE EDUCATION/TRAINING PROGRAM

## 2020-08-02 PROCEDURE — 99222 1ST HOSP IP/OBS MODERATE 55: CPT | Mod: ,,, | Performed by: NURSE PRACTITIONER

## 2020-08-02 PROCEDURE — 83735 ASSAY OF MAGNESIUM: CPT

## 2020-08-02 PROCEDURE — A4216 STERILE WATER/SALINE, 10 ML: HCPCS | Performed by: STUDENT IN AN ORGANIZED HEALTH CARE EDUCATION/TRAINING PROGRAM

## 2020-08-02 PROCEDURE — 99900035 HC TECH TIME PER 15 MIN (STAT)

## 2020-08-02 PROCEDURE — 36415 COLL VENOUS BLD VENIPUNCTURE: CPT

## 2020-08-02 PROCEDURE — 94770 HC EXHALED C02 TEST: CPT

## 2020-08-02 PROCEDURE — 94761 N-INVAS EAR/PLS OXIMETRY MLT: CPT

## 2020-08-02 PROCEDURE — 80053 COMPREHEN METABOLIC PANEL: CPT

## 2020-08-02 PROCEDURE — 20600001 HC STEP DOWN PRIVATE ROOM

## 2020-08-02 PROCEDURE — 99222 PR INITIAL HOSPITAL CARE,LEVL II: ICD-10-PCS | Mod: ,,, | Performed by: NURSE PRACTITIONER

## 2020-08-02 PROCEDURE — 25000003 PHARM REV CODE 250: Performed by: STUDENT IN AN ORGANIZED HEALTH CARE EDUCATION/TRAINING PROGRAM

## 2020-08-02 PROCEDURE — 84100 ASSAY OF PHOSPHORUS: CPT

## 2020-08-02 PROCEDURE — 85025 COMPLETE CBC W/AUTO DIFF WBC: CPT

## 2020-08-02 PROCEDURE — C9113 INJ PANTOPRAZOLE SODIUM, VIA: HCPCS | Performed by: STUDENT IN AN ORGANIZED HEALTH CARE EDUCATION/TRAINING PROGRAM

## 2020-08-02 RX ORDER — INSULIN ASPART 100 [IU]/ML
1-10 INJECTION, SOLUTION INTRAVENOUS; SUBCUTANEOUS EVERY 6 HOURS PRN
Status: DISCONTINUED | OUTPATIENT
Start: 2020-08-02 | End: 2020-08-04

## 2020-08-02 RX ORDER — GLUCAGON 1 MG
1 KIT INJECTION
Status: DISCONTINUED | OUTPATIENT
Start: 2020-08-02 | End: 2020-08-04

## 2020-08-02 RX ADMIN — IBUPROFEN 800 MG: 400 TABLET, FILM COATED ORAL at 09:08

## 2020-08-02 RX ADMIN — ONDANSETRON 4 MG: 2 INJECTION INTRAMUSCULAR; INTRAVENOUS at 09:08

## 2020-08-02 RX ADMIN — Medication: at 12:08

## 2020-08-02 RX ADMIN — SODIUM CHLORIDE 500 ML: 0.9 INJECTION, SOLUTION INTRAVENOUS at 01:08

## 2020-08-02 RX ADMIN — ACETAMINOPHEN 1000 MG: 500 TABLET ORAL at 06:08

## 2020-08-02 RX ADMIN — ENOXAPARIN SODIUM 40 MG: 40 INJECTION SUBCUTANEOUS at 04:08

## 2020-08-02 RX ADMIN — Medication: at 09:08

## 2020-08-02 RX ADMIN — Medication 10 ML: at 12:08

## 2020-08-02 RX ADMIN — ACETAMINOPHEN 1000 MG: 500 TABLET ORAL at 03:08

## 2020-08-02 RX ADMIN — IBUPROFEN 800 MG: 400 TABLET, FILM COATED ORAL at 03:08

## 2020-08-02 RX ADMIN — IBUPROFEN 800 MG: 400 TABLET, FILM COATED ORAL at 06:08

## 2020-08-02 RX ADMIN — MUPIROCIN: 20 OINTMENT TOPICAL at 09:08

## 2020-08-02 RX ADMIN — SODIUM CHLORIDE: 0.9 INJECTION, SOLUTION INTRAVENOUS at 12:08

## 2020-08-02 RX ADMIN — Medication 10 ML: at 06:08

## 2020-08-02 RX ADMIN — ACETAMINOPHEN 1000 MG: 500 TABLET ORAL at 09:08

## 2020-08-02 RX ADMIN — PANTOPRAZOLE SODIUM 40 MG: 40 INJECTION, POWDER, LYOPHILIZED, FOR SOLUTION INTRAVENOUS at 09:08

## 2020-08-02 NOTE — CARE UPDATE
Rapid Response Respiratory Therapy ETCO2 Check     Time of visit: 940     Code Status: Prior   : 1950  Bed:  A:   MRN: [id number]    SITUATION     Evaluated patient for: ETCO2 Compliance     BACKGROUND     Patient has a past medical history of Chronic kidney disease, stage 3, Diabetes mellitus, Diabetes mellitus, type 2, and Hypertension.    ASSESSMENT     Is the ETCO2 monitor on? (Yes/No)  yes   Is the patient wearing a cannula? (Yes/No) yes  Are ETCO2 orders placed? (Yes/No) yes  Is the patient on a PCA pump? (Yes/No) yes  ETCO2 monitored: ETCO2 (mmHg): 39 mmHg  O2 Device/Concentration: RA  Pulse: 85 Respiratory rate: 18 Temperature: Temp: 96.6 °F (35.9 °C) BP: BP: (!) 91/50 SpO2: 98    Level of Consciousness: Level of Consciousness (AVPU): alert  All Lung Field Breath Sounds: All Lung Fields Breath Sounds: Anterior:, Lateral:, clear  Ambu at bedside: Ambu bag with the patient?: Yes, Adult Ambu    INTERVENTIONS/RECOMMENDATIONS     Continue to monitor    PHYSICIAN ESCALATION     Physician Escalation (Yes/No): no     Care discussed with: n/a  Discussed plan of care primary RT     FOLLOW-UP     Please call back the Rapid Response RT, Sepideh Gusman, RRT at x [phone] for any questions or concerns.

## 2020-08-02 NOTE — ASSESSMENT & PLAN NOTE
BG goal 140-180  BG at time of consult within goal ranges. TPN off at this time. Expect increase insulin requirements while on TPN therapy.    Start Moderate Dose Correction Scale  BG monitoring q 4 hrs while NPO    ** Please call Endocrine for any BG related issues **  ** Please call Endocrine if TPN is restarted or diet changes **    Discharge plans:  TBD

## 2020-08-02 NOTE — PLAN OF CARE
Pt is A&Ox4 injury free. Pt is French speaking but can understand a small amount of english. Pain controlled with PCA. NG tube to LIWS only small amount out during night shift Brown/Green. The pt did not have any nausea during the night shift. The pt had concentrated urine in the muir Dr is aware. The pt received a bolus of NS at end of shift.

## 2020-08-02 NOTE — ASSESSMENT & PLAN NOTE
70 y.o. female 2 Days Post-Op for Procedure(s) (LRB):  RESECTION, COLON, LOW ANTERIOR (N/A)  CYSTOSCOPY (N/A)  CATHETERIZATION, URETER (Bilateral)  CREATION, ILEOSTOMY  LYSIS, ADHESIONS    NPO  IVF  500ml bolus NS this morning  NG tube to LIWS  TPN held  Keep muir  IS  OOB  PT/OT    Continue communication with use of

## 2020-08-02 NOTE — NURSING
Rn spoke to daughter In-law Azucena Nickerson about potential exposure of COVID-19. Daughter in-law stated the the Pt's uncle came to visit on 8/2  for about 45 mins-1 hr, RN noticed family member not wearing mask for full duration of visit. Daughter in-law states that when the brother was picked up he stated feeling unwell, hot and sweaty, thus prompting family to take him to the ED in [facility].     The Pt's son came to visit on Saturday 7/31, who had been potentially exposed to the brother of the Pt approximately 3-4 days prior. The son states feeling well and not presenting any symptoms. RN educated family to also get tested for COVID-19 and that we will be putting her isolation precautions, in addition to no longer having visitors until further notice.     MD aware of situation. ID has been contacted for further information on testing etc. WCTM.

## 2020-08-02 NOTE — PROGRESS NOTES
Dr. Urias notified of H/H of 6.6/21.3. No new orders. Requested to be contacted if pt becomes tachycardic.     ETTA

## 2020-08-02 NOTE — HPI
Reason for Consult: Management of T2DM, Hyperglycemia     Surgical Procedure and Date: n/a    Lab Results   Component Value Date    HGBA1C 6.7 (H) 07/09/2020     Diabetes diagnosis year: 2015    Home Diabetes Medications:  Lantus 10 units daily if am BG >180 and Metformin 1g BID    How often checking glucose at home? once daily   BG readings on regimen: 150-low 200s  Hypoglycemia on the regimen?  No  Missed doses on regimen?  No    Diabetes Complications include:     Hyperglycemia    Complicating diabetes co morbidities:   CKD      HPI:   Patient is a 70 y.o. female with a diagnosis of T2DM, HTN, and complicated surgical history, including h/o colon resection and Aiyana procedure, takedown colostomy, colostomy closure, clot of colo cutaneous fistula. She presnts with abdominal pain and vomiting. 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 CT scan from OSH showed partially obstructed small bowel. She was admitted to [facility]. Endocrinology consulted for management of T2DM. Of note, patient's primary language is Bahamian.  An  was used for this consult.

## 2020-08-02 NOTE — PROGRESS NOTES
[facility]  Colorectal Surgery  Progress Note    Patient Name: Azucena Morrison  MRN: [id number]  Admission Date: 7/15/2020  Hospital Length of Stay: 18 days  Attending Physician: Fabiana Valiente MD    Subjective:     Interval History:   No acute events overnight.   Pain better managed after education yesterday on how to use the PCA.  HR upper 80s-99.   NG in place with only 50cc output overnight. Denies nausea/vomitting.  Liquid brown stool and some gas in ostomy bag.   TPN held after high blood sugars yesterday.    Post-Op Info:  Procedure(s) (LRB):  RESECTION, COLON, LOW ANTERIOR (N/A)  CYSTOSCOPY (N/A)  CATHETERIZATION, URETER (Bilateral)  CREATION, ILEOSTOMY  LYSIS, ADHESIONS   2 Days Post-Op      Medications:  Continuous Infusions:   sodium chloride 0.9% 100 mL/hr at 08/02/20 1114    hydromorphone in 0.9 % NaCl 6 mg/30 ml       Scheduled Meds:   acetaminophen  1,000 mg Oral Q8H    enoxaparin  40 mg Subcutaneous Q24H    ibuprofen  800 mg Oral Q8H    lorazepam  0.5 mg Intravenous Once    mupirocin   Nasal BID    pantoprazole  40 mg Intravenous Daily    sodium chloride 0.9%  10 mL Intravenous Q6H     PRN Meds:   dextrose 50%    glucagon (human recombinant)    insulin aspart U-100    naloxone    ondansetron    ondansetron    oxyCODONE    oxyCODONE    promethazine (PHENERGAN) IVPB    sodium chloride 0.9%    sodium chloride 0.9%    traMADoL        Objective:     Vital Signs (Most Recent):  Temp: 96.6 °F (35.9 °C) (08/02/20 0817)  Pulse: 88 (08/02/20 0817)  Resp: 16 (08/02/20 0932)  BP: 102/76 (08/02/20 0935)  SpO2: (!) 94 % (08/02/20 0817) Vital Signs (24h Range):  Temp:  [96.6 °F (35.9 °C)-98.5 °F (36.9 °C)] 96.6 °F (35.9 °C)  Pulse:  [85-99] 88  Resp:  [12-18] 16  SpO2:  [94 %-99 %] 94 %  BP: ()/(53-76) 102/76     Intake/Output - Last 3 Shifts       07/31 0700 - 08/01 0659 08/01 0700 - 08/02 0659 08/02 0700 - 08/03 0659    P.O. 0 0     I.V. (mL/kg) 6386.7 (90.3)       Blood 350      IV Piggyback 950 500     TPN 1617.5      Total Intake(mL/kg) 9304.2 (131.6) 500 (7.1)     Urine (mL/kg/hr) 2605 (1.5) 750 (0.4) 140 (0.4)    Emesis/NG output 100      Drains  350     Other 0 0     Stool 0 0     Blood 800      Total Output 3505 1100 140    Net +5799.2 -600 -140           Stool Occurrence 0 x            Physical Exam  Constitutional:       General: She is not in acute distress.  HENT:      Head: Normocephalic.   Eyes:      Conjunctiva/sclera: Conjunctivae normal.   Neck:      Trachea: No tracheal deviation.   Cardiovascular:      Rate and Rhythm: Regular rhythm.   Pulmonary:      Effort: Pulmonary effort is normal. No respiratory distress.   Abdominal:      Comments: Provena in place.   Appliance on ileostomy; stoma pink. Liquid brown stool in bag and some gas.  Neurological:      Mental Status: She is alert and oriented to person, place, and time.         Significant Labs:  BMP (Last 3 Results):   Recent Labs   Lab 07/31/20 2031 08/01/20 0420 08/02/20  0528   * 295*  295*  295* 102    133*  133*  133* 136   K 4.3 4.0  4.0  4.0 4.2    104  104  104 106   CO2 21* 22*  22*  22* 23   BUN 16 19  19  19 22   CREATININE 0.8 0.9  0.9  0.9 0.9   CALCIUM 7.8* 7.5*  7.5*  7.5* 8.0*   MG 1.7 1.6  1.6 2.3     CBC (Last 3 Results):   Recent Labs   Lab 07/31/20 2031 08/01/20 0420 08/02/20  0528   WBC 7.87 15.39*  15.39* 18.47*   RBC 3.46* 3.50*  3.50* 3.06*   HGB 8.5* 8.6*  8.6* 7.5*   HCT 26.8* 27.0*  27.0* 23.8*    316  316 295   MCV 78* 77*  77* 78*   MCH 24.6* 24.6*  24.6* 24.5*   MCHC 31.7* 31.9*  31.9* 31.5*         Assessment/Plan:     * Cutaneous fistula  70 y.o. female 2 Days Post-Op for Procedure(s) (LRB):  RESECTION, COLON, LOW ANTERIOR (N/A)  CYSTOSCOPY (N/A)  CATHETERIZATION, URETER (Bilateral)  CREATION, ILEOSTOMY  LYSIS, ADHESIONS    NPO  IVF  500ml bolus NS this morning  NG tube to LIWS  TPN held  Keep  isadora  IS  OOB  PT/OT    Continue communication with use of       Chronic kidney disease, stage 3  Monitor labs  Monitor I&O    Generalized abdominal pain  Continue IV pain meds  PCA    Essential hypertension  Monitor BP  Hold home carvedilol while NG tube - will add IV labetalol if necessary. Not HTN this morning.    Type 2 diabetes mellitus without complication, with long-term current use of insulin  SSI  Accu check Q6    Consult Endocrine. Appreciate their recommendations.           Ramonita Osorio MD  Colorectal Surgery  [facility]

## 2020-08-02 NOTE — CONSULTS
[facility]  Endocrinology  Diabetes Consult Note    Consult Requested by: Fabiana Valiente MD   Reason for admit: Cutaneous fistula    HISTORY OF PRESENT ILLNESS:  Reason for Consult: Management of T2DM, Hyperglycemia     Surgical Procedure and Date: n/a    Lab Results   Component Value Date    HGBA1C 6.7 (H) 07/09/2020     Diabetes diagnosis year: 2015    Home Diabetes Medications:  Lantus 10 units daily if am BG >180 and Metformin 1g BID    How often checking glucose at home? once daily   BG readings on regimen: 150-low 200s  Hypoglycemia on the regimen?  No  Missed doses on regimen?  No    Diabetes Complications include:     Hyperglycemia    Complicating diabetes co morbidities:   CKD      HPI:   Patient is a 70 y.o. female with a diagnosis of T2DM, HTN, and complicated surgical history, including h/o colon resection and Aiyana procedure, takedown colostomy, colostomy closure, clot of colo cutaneous fistula. She presnts with abdominal pain and vomiting. 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 CT scan from OSH showed partially obstructed small bowel. She was admitted to [facility]. Endocrinology consulted for management of T2DM. Of note, patient's primary language is Slovak.  An  was used for this consult.       Interval HPI:   Overnight events: Remains in [facility]. NPO and TPN off at time of consult. BG at or slightly above goal ranges overnight with prn insulin correction scale.   Eating:   NPO  Nausea: No  Hypoglycemia and intervention: No  Fever: No  TPN and/or TF: No  If yes, type of TF/TPN and rate: none    PMH, PSH, FH, SH reviewed     ROS:  Constitutional: Negative for weight changes.  Eyes: Negative for visual disturbance.  Respiratory: Negative for cough.   Cardiovascular: Negative for chest pain.  Gastrointestinal: Positive for nausea, vomiting, and abdominal pain.   Endocrine: Negative for polyuria,  polydipsia.  Musculoskeletal: Negative for back pain.  Skin: Positive for wound.   Neurological: Negative for syncope.  Psychiatric/Behavioral: Negative for depression.    Review of Systems    Current Medications and/or Treatments Impacting Glycemic Control  Immunotherapy:    Immunosuppressants     None        Steroids:   Hormones (From admission, onward)    None        Pressors:    Autonomic Drugs (From admission, onward)    None        Hyperglycemia/Diabetes Medications:   Antihyperglycemics (From admission, onward)    Start     Stop Route Frequency Ordered    08/02/20 1123  insulin aspart U-100 pen 1-10 Units      -- SubQ Every 6 hours PRN 08/02/20 1024             PHYSICAL EXAMINATION:  Vitals:    08/02/20 0935   BP: 102/76   Pulse:    Resp:    Temp:      Body mass index is 29.45 kg/m².    Physical Exam   Constitutional: Well developed, well nourished, NAD.  ENT: External ears no masses with nose patent; normal hearing.  Neck: Supple; trachea midline.  Cardiovascular: Normal heart sounds, no LE edema. DP +2 bilaterally.  Lungs: Normal effort; lungs anterior bilaterally clear to auscultation.  Abdomen: Abdominal tenderness.   MS: No clubbing or cyanosis of nails noted;  unable to assess gait.  Skin: Fistula in RLQ and sub-umbilical area. Injection sites are ok. No lipo hypertropthy or atrophy.  Psychiatric: Good judgement and insight; normal mood and affect.  Neurological: Cranial nerves are grossly intact.   Foot: Nails in good condition, no amputations noted.          Labs Reviewed and Include   Recent Labs   Lab 08/02/20  0528      CALCIUM 8.0*   ALBUMIN 1.7*   PROT 5.0*      K 4.2   CO2 23      BUN 22   CREATININE 0.9   ALKPHOS 54*   ALT 14   AST 19   BILITOT 0.6     Lab Results   Component Value Date    WBC 18.47 (H) 08/02/2020    HGB 7.5 (L) 08/02/2020    HCT 23.8 (L) 08/02/2020    MCV 78 (L) 08/02/2020     08/02/2020     No results for input(s): TSH, FREET4 in the last 168  hours.  Lab Results   Component Value Date    HGBA1C 6.7 (H) 07/09/2020       Nutritional status:   Body mass index is 29.45 kg/m².  Lab Results   Component Value Date    ALBUMIN 1.7 (L) 08/02/2020    ALBUMIN 1.9 (L) 08/01/2020    ALBUMIN 2.5 (L) 07/31/2020     Lab Results   Component Value Date    PREALBUMIN 21 07/15/2020       Estimated Creatinine Clearance: 53.1 mL/min (based on SCr of 0.9 mg/dL).    Accu-Checks  Recent Labs     07/30/20  1210 07/30/20  1648 07/30/20  2121 07/31/20  0833 07/31/20  2038 08/01/20  0918 08/01/20  1349 08/01/20  1744 08/01/20  2131   POCTGLUCOSE 177* 155* 182* 146* 174* 350* 176* 199* 185*        ASSESSMENT and PLAN    * Cutaneous fistula  Managed per primary team        Type 2 diabetes mellitus without complication, with long-term current use of insulin  BG goal 140-180  BG at time of consult within goal ranges. TPN off at this time. Expect increase insulin requirements while on TPN therapy.    Start Moderate Dose Correction Scale  BG monitoring q 4 hrs while NPO    ** Please call Endocrine for any BG related issues **  ** Please call Endocrine if TPN is restarted or diet changes **    Discharge plans:  TBD    Chronic kidney disease, stage 3  Titrate insulin slowly  Avoid insulin stacking          Plan discussed with patient and family at bedside using language line interpretor.     Va Genao NP  Endocrinology  [facility]

## 2020-08-02 NOTE — SUBJECTIVE & OBJECTIVE
Interval HPI:   Overnight events: Remains in [facility]. NPO and TPN off at time of consult. BG at or slightly above goal ranges overnight with prn insulin correction scale.   Eating:   NPO  Nausea: No  Hypoglycemia and intervention: No  Fever: No  TPN and/or TF: No  If yes, type of TF/TPN and rate: none    PMH, PSH, FH, SH reviewed     ROS:  Constitutional: Negative for weight changes.  Eyes: Negative for visual disturbance.  Respiratory: Negative for cough.   Cardiovascular: Negative for chest pain.  Gastrointestinal: Positive for nausea, vomiting, and abdominal pain.   Endocrine: Negative for polyuria, polydipsia.  Musculoskeletal: Negative for back pain.  Skin: Positive for wound.   Neurological: Negative for syncope.  Psychiatric/Behavioral: Negative for depression.    Review of Systems    Current Medications and/or Treatments Impacting Glycemic Control  Immunotherapy:    Immunosuppressants     None        Steroids:   Hormones (From admission, onward)    None        Pressors:    Autonomic Drugs (From admission, onward)    None        Hyperglycemia/Diabetes Medications:   Antihyperglycemics (From admission, onward)    Start     Stop Route Frequency Ordered    08/02/20 1123  insulin aspart U-100 pen 1-10 Units      -- SubQ Every 6 hours PRN 08/02/20 1024             PHYSICAL EXAMINATION:  Vitals:    08/02/20 0935   BP: 102/76   Pulse:    Resp:    Temp:      Body mass index is 29.45 kg/m².    Physical Exam   Constitutional: Well developed, well nourished, NAD.  ENT: External ears no masses with nose patent; normal hearing.  Neck: Supple; trachea midline.  Cardiovascular: Normal heart sounds, no LE edema. DP +2 bilaterally.  Lungs: Normal effort; lungs anterior bilaterally clear to auscultation.  Abdomen: Abdominal tenderness.   MS: No clubbing or cyanosis of nails noted;  unable to assess gait.  Skin: Fistula in RLQ and sub-umbilical area. Injection sites are ok. No lipo hypertropthy or atrophy.  Psychiatric: Good  judgement and insight; normal mood and affect.  Neurological: Cranial nerves are grossly intact.   Foot: Nails in good condition, no amputations noted.

## 2020-08-02 NOTE — PROGRESS NOTES
At bedside, Dr. Valiente indicated to increased fluids to 100 ml/hr and keep muir catheter in place until noon at which time the urine output will be re-evaluated.

## 2020-08-02 NOTE — SUBJECTIVE & OBJECTIVE
Subjective:     Interval History:   No acute events overnight.   Pain better managed after education yesterday on how to use the PCA.  HR upper 80s-99.   NG in place with only 50cc output overnight. Denies nausea/vomitting.  Liquid brown stool and some gas in ostomy bag.   TPN held after high blood sugars yesterday.    Post-Op Info:  Procedure(s) (LRB):  RESECTION, COLON, LOW ANTERIOR (N/A)  CYSTOSCOPY (N/A)  CATHETERIZATION, URETER (Bilateral)  CREATION, ILEOSTOMY  LYSIS, ADHESIONS   2 Days Post-Op      Medications:  Continuous Infusions:   sodium chloride 0.9% 100 mL/hr at 08/02/20 1114    hydromorphone in 0.9 % NaCl 6 mg/30 ml       Scheduled Meds:   acetaminophen  1,000 mg Oral Q8H    enoxaparin  40 mg Subcutaneous Q24H    ibuprofen  800 mg Oral Q8H    lorazepam  0.5 mg Intravenous Once    mupirocin   Nasal BID    pantoprazole  40 mg Intravenous Daily    sodium chloride 0.9%  10 mL Intravenous Q6H     PRN Meds:   dextrose 50%    glucagon (human recombinant)    insulin aspart U-100    naloxone    ondansetron    ondansetron    oxyCODONE    oxyCODONE    promethazine (PHENERGAN) IVPB    sodium chloride 0.9%    sodium chloride 0.9%    traMADoL        Objective:     Vital Signs (Most Recent):  Temp: 96.6 °F (35.9 °C) (08/02/20 0817)  Pulse: 88 (08/02/20 0817)  Resp: 16 (08/02/20 0932)  BP: 102/76 (08/02/20 0935)  SpO2: (!) 94 % (08/02/20 0817) Vital Signs (24h Range):  Temp:  [96.6 °F (35.9 °C)-98.5 °F (36.9 °C)] 96.6 °F (35.9 °C)  Pulse:  [85-99] 88  Resp:  [12-18] 16  SpO2:  [94 %-99 %] 94 %  BP: ()/(53-76) 102/76     Intake/Output - Last 3 Shifts       07/31 0700 - 08/01 0659 08/01 0700 - 08/02 0659 08/02 0700 - 08/03 0659    P.O. 0 0     I.V. (mL/kg) 6386.7 (90.3)      Blood 350      IV Piggyback 950 500     TPN 1617.5      Total Intake(mL/kg) 9304.2 (131.6) 500 (7.1)     Urine (mL/kg/hr) 2605 (1.5) 750 (0.4) 140 (0.4)    Emesis/NG output 100      Drains  350     Other 0 0     Stool 0  0     Blood 800      Total Output 3505 1100 140    Net +5799.2 -600 -140           Stool Occurrence 0 x            Physical Exam  Constitutional:       General: She is not in acute distress.  HENT:      Head: Normocephalic.   Eyes:      Conjunctiva/sclera: Conjunctivae normal.   Neck:      Trachea: No tracheal deviation.   Cardiovascular:      Rate and Rhythm: Regular rhythm.   Pulmonary:      Effort: Pulmonary effort is normal. No respiratory distress.   Abdominal:      Comments: Provena in place.   Appliance on ileostomy; stoma pink. Liquid brown stool in bag and some gas.  Neurological:      Mental Status: She is alert and oriented to person, place, and time.         Significant Labs:  BMP (Last 3 Results):   Recent Labs   Lab 07/31/20 2031 08/01/20 0420 08/02/20  0528   * 295*  295*  295* 102    133*  133*  133* 136   K 4.3 4.0  4.0  4.0 4.2    104  104  104 106   CO2 21* 22*  22*  22* 23   BUN 16 19  19  19 22   CREATININE 0.8 0.9  0.9  0.9 0.9   CALCIUM 7.8* 7.5*  7.5*  7.5* 8.0*   MG 1.7 1.6  1.6 2.3     CBC (Last 3 Results):   Recent Labs   Lab 07/31/20 2031 08/01/20 0420 08/02/20  0528   WBC 7.87 15.39*  15.39* 18.47*   RBC 3.46* 3.50*  3.50* 3.06*   HGB 8.5* 8.6*  8.6* 7.5*   HCT 26.8* 27.0*  27.0* 23.8*    316  316 295   MCV 78* 77*  77* 78*   MCH 24.6* 24.6*  24.6* 24.5*   MCHC 31.7* 31.9*  31.9* 31.5*

## 2020-08-03 PROBLEM — D62 ACUTE BLOOD LOSS ANEMIA: Status: ACTIVE | Noted: 2020-08-03

## 2020-08-03 LAB
ABO + RH BLD: NORMAL
ALBUMIN SERPL BCP-MCNC: 1.4 G/DL (ref 3.5–5.2)
ALP SERPL-CCNC: 80 U/L (ref 55–135)
ALT SERPL W/O P-5'-P-CCNC: 10 U/L (ref 10–44)
ANION GAP SERPL CALC-SCNC: 7 MMOL/L (ref 8–16)
ANISOCYTOSIS BLD QL SMEAR: SLIGHT
AST SERPL-CCNC: 15 U/L (ref 10–40)
BASOPHILS # BLD AUTO: 0.03 K/UL (ref 0–0.2)
BASOPHILS # BLD AUTO: 0.03 K/UL (ref 0–0.2)
BASOPHILS NFR BLD: 0.2 % (ref 0–1.9)
BASOPHILS NFR BLD: 0.2 % (ref 0–1.9)
BILIRUB SERPL-MCNC: 0.5 MG/DL (ref 0.1–1)
BLD GP AB SCN CELLS X3 SERPL QL: NORMAL
BLD PROD TYP BPU: NORMAL
BLD PROD TYP BPU: NORMAL
BLOOD UNIT EXPIRATION DATE: NORMAL
BLOOD UNIT EXPIRATION DATE: NORMAL
BLOOD UNIT TYPE CODE: 6200
BLOOD UNIT TYPE CODE: 6200
BLOOD UNIT TYPE: NORMAL
BLOOD UNIT TYPE: NORMAL
BUN SERPL-MCNC: 22 MG/DL (ref 8–23)
BURR CELLS BLD QL SMEAR: ABNORMAL
CALCIUM SERPL-MCNC: 7.7 MG/DL (ref 8.7–10.5)
CHLORIDE SERPL-SCNC: 108 MMOL/L (ref 95–110)
CO2 SERPL-SCNC: 23 MMOL/L (ref 23–29)
CODING SYSTEM: NORMAL
CODING SYSTEM: NORMAL
CREAT SERPL-MCNC: 0.9 MG/DL (ref 0.5–1.4)
CRP SERPL-MCNC: 375.2 MG/L (ref 0–8.2)
DIFFERENTIAL METHOD: ABNORMAL
DIFFERENTIAL METHOD: ABNORMAL
DISPENSE STATUS: NORMAL
DISPENSE STATUS: NORMAL
EOSINOPHIL # BLD AUTO: 0.6 K/UL (ref 0–0.5)
EOSINOPHIL # BLD AUTO: 0.7 K/UL (ref 0–0.5)
EOSINOPHIL NFR BLD: 3.6 % (ref 0–8)
EOSINOPHIL NFR BLD: 4.3 % (ref 0–8)
ERYTHROCYTE [DISTWIDTH] IN BLOOD BY AUTOMATED COUNT: 17.5 % (ref 11.5–14.5)
ERYTHROCYTE [DISTWIDTH] IN BLOOD BY AUTOMATED COUNT: 18.1 % (ref 11.5–14.5)
EST. GFR  (AFRICAN AMERICAN): >60 ML/MIN/1.73 M^2
EST. GFR  (NON AFRICAN AMERICAN): >60 ML/MIN/1.73 M^2
GLUCOSE SERPL-MCNC: 184 MG/DL (ref 70–110)
GLUCOSE SERPL-MCNC: 207 MG/DL (ref 70–110)
GLUCOSE SERPL-MCNC: 227 MG/DL (ref 70–110)
GLUCOSE SERPL-MCNC: 235 MG/DL (ref 70–110)
GLUCOSE SERPL-MCNC: 269 MG/DL (ref 70–110)
GLUCOSE SERPL-MCNC: 292 MG/DL (ref 70–110)
GLUCOSE SERPL-MCNC: 84 MG/DL (ref 70–110)
HCO3 UR-SCNC: 19.3 MMOL/L (ref 24–28)
HCO3 UR-SCNC: 19.8 MMOL/L (ref 24–28)
HCO3 UR-SCNC: 21.2 MMOL/L (ref 24–28)
HCO3 UR-SCNC: 21.2 MMOL/L (ref 24–28)
HCO3 UR-SCNC: 21.6 MMOL/L (ref 24–28)
HCO3 UR-SCNC: 21.8 MMOL/L (ref 24–28)
HCT VFR BLD AUTO: 20.2 % (ref 37–48.5)
HCT VFR BLD AUTO: 24.1 % (ref 37–48.5)
HCT VFR BLD CALC: 17 %PCV (ref 36–54)
HCT VFR BLD CALC: 18 %PCV (ref 36–54)
HCT VFR BLD CALC: 21 %PCV (ref 36–54)
HCT VFR BLD CALC: 21 %PCV (ref 36–54)
HCT VFR BLD CALC: 22 %PCV (ref 36–54)
HCT VFR BLD CALC: 33 %PCV (ref 36–54)
HGB BLD-MCNC: 6.3 G/DL (ref 12–16)
HGB BLD-MCNC: 7.9 G/DL (ref 12–16)
HYPOCHROMIA BLD QL SMEAR: ABNORMAL
IMM GRANULOCYTES # BLD AUTO: 0.13 K/UL (ref 0–0.04)
IMM GRANULOCYTES # BLD AUTO: 0.14 K/UL (ref 0–0.04)
IMM GRANULOCYTES NFR BLD AUTO: 0.8 % (ref 0–0.5)
IMM GRANULOCYTES NFR BLD AUTO: 0.9 % (ref 0–0.5)
LYMPHOCYTES # BLD AUTO: 1.2 K/UL (ref 1–4.8)
LYMPHOCYTES # BLD AUTO: 1.2 K/UL (ref 1–4.8)
LYMPHOCYTES NFR BLD: 7.3 % (ref 18–48)
LYMPHOCYTES NFR BLD: 7.5 % (ref 18–48)
MAGNESIUM SERPL-MCNC: 1.9 MG/DL (ref 1.6–2.6)
MCH RBC QN AUTO: 24.6 PG (ref 27–31)
MCH RBC QN AUTO: 25.5 PG (ref 27–31)
MCHC RBC AUTO-ENTMCNC: 31.2 G/DL (ref 32–36)
MCHC RBC AUTO-ENTMCNC: 32.8 G/DL (ref 32–36)
MCV RBC AUTO: 78 FL (ref 82–98)
MCV RBC AUTO: 79 FL (ref 82–98)
MONOCYTES # BLD AUTO: 0.4 K/UL (ref 0.3–1)
MONOCYTES # BLD AUTO: 0.4 K/UL (ref 0.3–1)
MONOCYTES NFR BLD: 2.6 % (ref 4–15)
MONOCYTES NFR BLD: 2.7 % (ref 4–15)
NEUTROPHILS # BLD AUTO: 13.2 K/UL (ref 1.8–7.7)
NEUTROPHILS # BLD AUTO: 13.9 K/UL (ref 1.8–7.7)
NEUTROPHILS NFR BLD: 84.6 % (ref 38–73)
NEUTROPHILS NFR BLD: 85.3 % (ref 38–73)
NRBC BLD-RTO: 0 /100 WBC
NRBC BLD-RTO: 0 /100 WBC
NUM UNITS TRANS PACKED RBC: NORMAL
NUM UNITS TRANS PACKED RBC: NORMAL
OVALOCYTES BLD QL SMEAR: ABNORMAL
PCO2 BLDA: 33.5 MMHG (ref 35–45)
PCO2 BLDA: 34.1 MMHG (ref 35–45)
PCO2 BLDA: 34.2 MMHG (ref 35–45)
PCO2 BLDA: 35 MMHG (ref 35–45)
PCO2 BLDA: 36.1 MMHG (ref 35–45)
PCO2 BLDA: 37.3 MMHG (ref 35–45)
PH SMN: 7.37 [PH] (ref 7.35–7.45)
PH SMN: 7.38 [PH] (ref 7.35–7.45)
PH SMN: 7.39 [PH] (ref 7.35–7.45)
PH SMN: 7.41 [PH] (ref 7.35–7.45)
PHOSPHATE SERPL-MCNC: 5 MG/DL (ref 2.7–4.5)
PLATELET # BLD AUTO: 302 K/UL (ref 150–350)
PLATELET # BLD AUTO: 349 K/UL (ref 150–350)
PLATELET BLD QL SMEAR: ABNORMAL
PMV BLD AUTO: 8.7 FL (ref 9.2–12.9)
PMV BLD AUTO: 8.9 FL (ref 9.2–12.9)
PO2 BLDA: 106 MMHG (ref 80–100)
PO2 BLDA: 181 MMHG (ref 80–100)
PO2 BLDA: 191 MMHG (ref 80–100)
PO2 BLDA: 201 MMHG (ref 80–100)
PO2 BLDA: 97 MMHG (ref 80–100)
PO2 BLDA: 98 MMHG (ref 80–100)
POC BE: -3 MMOL/L
POC BE: -3 MMOL/L
POC BE: -4 MMOL/L
POC BE: -4 MMOL/L
POC BE: -6 MMOL/L
POC BE: -6 MMOL/L
POC IONIZED CALCIUM: 1.02 MMOL/L (ref 1.06–1.42)
POC IONIZED CALCIUM: 1.09 MMOL/L (ref 1.06–1.42)
POC IONIZED CALCIUM: 1.1 MMOL/L (ref 1.06–1.42)
POC IONIZED CALCIUM: 1.13 MMOL/L (ref 1.06–1.42)
POC IONIZED CALCIUM: 1.17 MMOL/L (ref 1.06–1.42)
POC IONIZED CALCIUM: 1.32 MMOL/L (ref 1.06–1.42)
POC SATURATED O2: 100 % (ref 95–100)
POC SATURATED O2: 97 % (ref 95–100)
POC SATURATED O2: 98 % (ref 95–100)
POC SATURATED O2: 98 % (ref 95–100)
POC TCO2: 20 MMOL/L (ref 23–27)
POC TCO2: 21 MMOL/L (ref 23–27)
POC TCO2: 22 MMOL/L (ref 23–27)
POC TCO2: 22 MMOL/L (ref 23–27)
POC TCO2: 23 MMOL/L (ref 23–27)
POC TCO2: 23 MMOL/L (ref 23–27)
POCT GLUCOSE: 103 MG/DL (ref 70–110)
POCT GLUCOSE: 90 MG/DL (ref 70–110)
POCT GLUCOSE: 93 MG/DL (ref 70–110)
POCT GLUCOSE: 96 MG/DL (ref 70–110)
POCT GLUCOSE: 96 MG/DL (ref 70–110)
POIKILOCYTOSIS BLD QL SMEAR: SLIGHT
POLYCHROMASIA BLD QL SMEAR: ABNORMAL
POTASSIUM BLD-SCNC: 3.7 MMOL/L (ref 3.5–5.1)
POTASSIUM BLD-SCNC: 3.9 MMOL/L (ref 3.5–5.1)
POTASSIUM BLD-SCNC: 4 MMOL/L (ref 3.5–5.1)
POTASSIUM BLD-SCNC: 4.3 MMOL/L (ref 3.5–5.1)
POTASSIUM BLD-SCNC: 4.3 MMOL/L (ref 3.5–5.1)
POTASSIUM BLD-SCNC: 4.7 MMOL/L (ref 3.5–5.1)
POTASSIUM SERPL-SCNC: 3.6 MMOL/L (ref 3.5–5.1)
PROT SERPL-MCNC: 4.7 G/DL (ref 6–8.4)
RBC # BLD AUTO: 2.56 M/UL (ref 4–5.4)
RBC # BLD AUTO: 3.1 M/UL (ref 4–5.4)
SAMPLE: ABNORMAL
SODIUM BLD-SCNC: 135 MMOL/L (ref 136–145)
SODIUM BLD-SCNC: 135 MMOL/L (ref 136–145)
SODIUM BLD-SCNC: 138 MMOL/L (ref 136–145)
SODIUM BLD-SCNC: 138 MMOL/L (ref 136–145)
SODIUM BLD-SCNC: 139 MMOL/L (ref 136–145)
SODIUM BLD-SCNC: 139 MMOL/L (ref 136–145)
SODIUM SERPL-SCNC: 138 MMOL/L (ref 136–145)
TRIGL SERPL-MCNC: 534 MG/DL (ref 30–150)
WBC # BLD AUTO: 15.56 K/UL (ref 3.9–12.7)
WBC # BLD AUTO: 16.26 K/UL (ref 3.9–12.7)

## 2020-08-03 PROCEDURE — 63600175 PHARM REV CODE 636 W HCPCS: Performed by: STUDENT IN AN ORGANIZED HEALTH CARE EDUCATION/TRAINING PROGRAM

## 2020-08-03 PROCEDURE — P9016 RBC LEUKOCYTES REDUCED: HCPCS

## 2020-08-03 PROCEDURE — 86850 RBC ANTIBODY SCREEN: CPT

## 2020-08-03 PROCEDURE — 20600001 HC STEP DOWN PRIVATE ROOM

## 2020-08-03 PROCEDURE — 86140 C-REACTIVE PROTEIN: CPT

## 2020-08-03 PROCEDURE — 85025 COMPLETE CBC W/AUTO DIFF WBC: CPT

## 2020-08-03 PROCEDURE — A4217 STERILE WATER/SALINE, 500 ML: HCPCS | Performed by: STUDENT IN AN ORGANIZED HEALTH CARE EDUCATION/TRAINING PROGRAM

## 2020-08-03 PROCEDURE — 25000003 PHARM REV CODE 250: Performed by: STUDENT IN AN ORGANIZED HEALTH CARE EDUCATION/TRAINING PROGRAM

## 2020-08-03 PROCEDURE — 86920 COMPATIBILITY TEST SPIN: CPT

## 2020-08-03 PROCEDURE — 94770 HC EXHALED C02 TEST: CPT

## 2020-08-03 PROCEDURE — A4216 STERILE WATER/SALINE, 10 ML: HCPCS | Performed by: STUDENT IN AN ORGANIZED HEALTH CARE EDUCATION/TRAINING PROGRAM

## 2020-08-03 PROCEDURE — 99900035 HC TECH TIME PER 15 MIN (STAT)

## 2020-08-03 PROCEDURE — 80053 COMPREHEN METABOLIC PANEL: CPT

## 2020-08-03 PROCEDURE — 36430 TRANSFUSION BLD/BLD COMPNT: CPT

## 2020-08-03 PROCEDURE — 83735 ASSAY OF MAGNESIUM: CPT

## 2020-08-03 PROCEDURE — 84478 ASSAY OF TRIGLYCERIDES: CPT

## 2020-08-03 PROCEDURE — 84100 ASSAY OF PHOSPHORUS: CPT

## 2020-08-03 PROCEDURE — 94761 N-INVAS EAR/PLS OXIMETRY MLT: CPT

## 2020-08-03 PROCEDURE — C9113 INJ PANTOPRAZOLE SODIUM, VIA: HCPCS | Performed by: STUDENT IN AN ORGANIZED HEALTH CARE EDUCATION/TRAINING PROGRAM

## 2020-08-03 RX ORDER — FUROSEMIDE 10 MG/ML
20 INJECTION INTRAMUSCULAR; INTRAVENOUS ONCE
Status: COMPLETED | OUTPATIENT
Start: 2020-08-03 | End: 2020-08-03

## 2020-08-03 RX ORDER — HYDROCODONE BITARTRATE AND ACETAMINOPHEN 500; 5 MG/1; MG/1
TABLET ORAL
Status: DISCONTINUED | OUTPATIENT
Start: 2020-08-03 | End: 2020-08-05

## 2020-08-03 RX ADMIN — Medication: at 10:08

## 2020-08-03 RX ADMIN — ACETAMINOPHEN 1000 MG: 500 TABLET ORAL at 02:08

## 2020-08-03 RX ADMIN — MAGNESIUM SULFATE HEPTAHYDRATE: 500 INJECTION, SOLUTION INTRAMUSCULAR; INTRAVENOUS at 10:08

## 2020-08-03 RX ADMIN — IBUPROFEN 800 MG: 400 TABLET, FILM COATED ORAL at 10:08

## 2020-08-03 RX ADMIN — PROMETHAZINE HYDROCHLORIDE 6.25 MG: 25 INJECTION INTRAMUSCULAR; INTRAVENOUS at 02:08

## 2020-08-03 RX ADMIN — ACETAMINOPHEN 1000 MG: 500 TABLET ORAL at 05:08

## 2020-08-03 RX ADMIN — Medication 10 ML: at 12:08

## 2020-08-03 RX ADMIN — PANTOPRAZOLE SODIUM 40 MG: 40 INJECTION, POWDER, LYOPHILIZED, FOR SOLUTION INTRAVENOUS at 08:08

## 2020-08-03 RX ADMIN — Medication 10 ML: at 06:08

## 2020-08-03 RX ADMIN — Medication: at 12:08

## 2020-08-03 RX ADMIN — MUPIROCIN: 20 OINTMENT TOPICAL at 08:08

## 2020-08-03 RX ADMIN — ACETAMINOPHEN 1000 MG: 500 TABLET ORAL at 10:08

## 2020-08-03 RX ADMIN — FUROSEMIDE 20 MG: 10 INJECTION, SOLUTION INTRAMUSCULAR; INTRAVENOUS at 08:08

## 2020-08-03 RX ADMIN — ONDANSETRON 8 MG: 2 INJECTION INTRAMUSCULAR; INTRAVENOUS at 08:08

## 2020-08-03 RX ADMIN — ENOXAPARIN SODIUM 40 MG: 40 INJECTION SUBCUTANEOUS at 05:08

## 2020-08-03 RX ADMIN — IBUPROFEN 800 MG: 400 TABLET, FILM COATED ORAL at 02:08

## 2020-08-03 RX ADMIN — IBUPROFEN 800 MG: 400 TABLET, FILM COATED ORAL at 05:08

## 2020-08-03 NOTE — NURSING
Sx on call notified about low urine output, MD at bedside to evaluate patient and prevena wound vac that stopped working. Wound vac connected to continuous wall suction per MD. Will continue to monitor UOP and notify MD of any changes.

## 2020-08-03 NOTE — PLAN OF CARE
POC reviewed with patient, states understanding. AOx4. VS WDL. NPO. NGT to right nare clamped. No complaints of nausea. Pain effectively managed per PCA. Higginbotham to gravity, low MD GISEL aware. Colostomy to RLQ WDL. Wound vac to ML to wall suction. IVF infusing per order. Airborne precautions maintained. Will continue to manage POC.

## 2020-08-03 NOTE — ASSESSMENT & PLAN NOTE
Preop Hg around 8.5 - lost about 800 cc during OR. Post op Hg same as preop - likely was under-resuscitated after surgery and now has been resuscitated with a Hg down to 6.3 this morning - not tachycardic or hypotensive and she's asymptomatic    Will prepare 2 u pRBCs this morning and likely transfuse depending on staff preference.

## 2020-08-03 NOTE — ASSESSMENT & PLAN NOTE
70 y.o. female 3 Days Post-Op for Procedure(s) (LRB):  RESECTION, COLON, LOW ANTERIOR (N/A)  CYSTOSCOPY (N/A)  CATHETERIZATION, URETER (Bilateral)  CREATION, ILEOSTOMY  LYSIS, ADHESIONS    NPO  IVF at 100 cc/h  Will likely d/c NG tube today  May restart TPN today, will hold lipids d/t high TGs  Keep muir another day - may consider lasix today  IS  OOB  PT/OT    Continue communication with use of

## 2020-08-03 NOTE — PLAN OF CARE
POC reviewed with patient who verbalized understanding. VSS on RA except 90s/50s BP. MD aware. AAOX4. Remains free of falls and injury. Ambulated in hallways x 1 with nurse and up to chair for several hrs.     - ML to wound vac   - R ileostomy, liquid brown   - NG to R nare, clamped per order, nauseated x1 during am trial, tolerating 2nd clamping trial  - muir catheter, urine output approx 30 ml/h, md aware   - endocrine consulted today, notify when TPN resumed  - COVID 19 precautions, see previous note    Nausea controlled with PRN medications. Pain controlled with PCA pump. Blood glucose being monitored every 4 hours with coverage needed. Educated on IS use. Patient denies chest pain & SOB. SCDS in place. No acute events. No distress noted. Bed in lowest position, call light within reach, frequent rounds made for safety.     Night nurse will resume care.

## 2020-08-03 NOTE — PROGRESS NOTES
RN contacted by Azucena Nickerson who stated that pt's brother who had visited earlier was tested for covid 19.Positive result and admitted to [facility]. Charge rn and md notified.

## 2020-08-03 NOTE — ASSESSMENT & PLAN NOTE
Monitor labs  Monitor I&O    Decreased UOP - on HCTZ at home. Now she seems resuscitated and has puffy arms - if giving blood today may consider lasix.

## 2020-08-03 NOTE — NURSING
NGT clamped per MD order. No complaints of nausea, pain or discomfort. Will continue to manage POC.

## 2020-08-03 NOTE — PLAN OF CARE
POC reviewed with pt, verbalized understanding. AAOx4. VSS on RA. NPO with intermittent nausea. LLQ ileostomy brown stool, gas. Higginbotham to gravity with adequate UOP. Ambulated halls x1, up in chair for several hours. ML to wall suct. Pain managed, Dilaudid PCA. Bed in lowest position, Call light within reach. WCTM.      Contact precautions d/c per ID recs. WCTM for s/s of COVID.

## 2020-08-03 NOTE — NURSING
Talked with Infection Control RNChikis about COVID exposure. She escalated concern up to medical director. Stated due to pt not immunocompromised and small window of exposure time - this is a low risk situation and Covid precautions do not need to be continued at this time. However, continue to monitor for symptoms. MARIANA Kauffman notified of infection control instructions. WCTM.

## 2020-08-03 NOTE — PROGRESS NOTES
[facility]  Colorectal Surgery  Progress Note    Patient Name: Azucena Morrison  MRN: [id number]  Admission Date: 7/15/2020  Hospital Length of Stay: 19 days  Attending Physician: Fabiana Valiente MD    Subjective:     Interval History:   No acute events overnight. Pain well controlled, pushing PCA. NG clamped for a second trial (first time was unclamped due to nausea) and after 6 hours back to suction this morning with 100 out. No nausea this morning. Gas and stool in ostomy. Was up ambulating yesterday. Family member ([place]) who visited now COVID + so pt now on full COVID precautions with no visitors allowed. Marginal UOP overnight. Provena stopped working last night; tubing hooked to wall suction.    Post-Op Info:  Procedure(s) (LRB):  RESECTION, COLON, LOW ANTERIOR (N/A)  CYSTOSCOPY (N/A)  CATHETERIZATION, URETER (Bilateral)  CREATION, ILEOSTOMY  LYSIS, ADHESIONS   3 Days Post-Op      Medications:  Continuous Infusions:   sodium chloride 0.9% 100 mL/hr at 08/02/20 1114    hydromorphone in 0.9 % NaCl 6 mg/30 ml       Scheduled Meds:   acetaminophen  1,000 mg Oral Q8H    enoxaparin  40 mg Subcutaneous Q24H    ibuprofen  800 mg Oral Q8H    lorazepam  0.5 mg Intravenous Once    mupirocin   Nasal BID    pantoprazole  40 mg Intravenous Daily    sodium chloride 0.9%  10 mL Intravenous Q6H     PRN Meds:   sodium chloride    dextrose 50%    glucagon (human recombinant)    insulin aspart U-100    naloxone    ondansetron    ondansetron    oxyCODONE    oxyCODONE    promethazine (PHENERGAN) IVPB    sodium chloride 0.9%    sodium chloride 0.9%    traMADoL        Objective:     Vital Signs (Most Recent):  Temp: 97.1 °F (36.2 °C) (08/03/20 0334)  Pulse: 86 (08/03/20 0543)  Resp: 17 (08/03/20 0334)  BP: (!) 141/58 (08/03/20 0543)  SpO2: 99 % (08/03/20 0543) Vital Signs (24h Range):  Temp:  [96.6 °F (35.9 °C)-97.9 °F (36.6 °C)] 97.1 °F (36.2 °C)  Pulse:  [81-92] 86  Resp:  [12-18]  17  SpO2:  [94 %-100 %] 99 %  BP: ()/(50-63) 141/58     Intake/Output - Last 3 Shifts       08/01 0700 - 08/02 0659 08/02 0700 - 08/03 0659    P.O. 0 0    I.V. (mL/kg)  2641.7 (37.4)    IV Piggyback 500     Total Intake(mL/kg) 500 (7.1) 2641.7 (37.4)    Urine (mL/kg/hr) 750 (0.4) 560 (0.3)    Drains 350 200    Other 0 0    Stool 0 195    Total Output 1100 955    Net -600 +1686.7          Stool Occurrence  1 x          Physical Exam  Constitutional:       General: She is not in acute distress.  HENT:      Head: Normocephalic.   Eyes:      Conjunctiva/sclera: Conjunctivae normal.   Neck:      Trachea: No tracheal deviation.   Cardiovascular:      Rate and Rhythm: Regular rhythm. Arms/hands are puffy  Pulmonary:      Effort: Pulmonary effort is normal. No respiratory distress.   Abdominal:      Comments: Provena in place.   Appliance on ileostomy; stoma pink. Liquid with some solid brown stool in bag and some gas.   Neurological:      Mental Status: She is alert and oriented to person, place, and time.         Significant Labs:  BMP (Last 3 Results):   Recent Labs   Lab 08/01/20  0420 08/02/20  0528 08/03/20  0330   *  295*  295* 102 84   *  133*  133* 136 138   K 4.0  4.0  4.0 4.2 3.6     104  104 106 108   CO2 22*  22*  22* 23 23   BUN 19  19  19 22 22   CREATININE 0.9  0.9  0.9 0.9 0.9   CALCIUM 7.5*  7.5*  7.5* 8.0* 7.7*   MG 1.6  1.6 2.3 1.9     CBC (Last 3 Results):   Recent Labs   Lab 08/02/20  0528 08/02/20  1312 08/03/20  0330   WBC 18.47* 16.89* 15.56*   RBC 3.06* 2.70* 2.56*   HGB 7.5* 6.6* 6.3*   HCT 23.8* 21.3* 20.2*    271 302   MCV 78* 79* 79*   MCH 24.5* 24.4* 24.6*   MCHC 31.5* 31.0* 31.2*         Assessment/Plan:     * Cutaneous fistula  70 y.o. female 3 Days Post-Op for Procedure(s) (LRB):  RESECTION, COLON, LOW ANTERIOR (N/A)  CYSTOSCOPY (N/A)  CATHETERIZATION, URETER (Bilateral)  CREATION, ILEOSTOMY  LYSIS, ADHESIONS    NPO  IVF at 100 cc/h  Will  likely d/c NG tube today  May restart TPN today, will hold lipids d/t high TGs  Keep muir another day - may consider lasix today  IS  OOB  PT/OT    Continue communication with use of       Acute blood loss anemia  Preop Hg around 8.5 - lost about 800 cc during OR. Post op Hg same as preop - likely was under-resuscitated after surgery and now has been resuscitated with a Hg down to 6.3 this morning - not tachycardic or hypotensive and she's asymptomatic    Will prepare 2 u pRBCs this morning and likely transfuse depending on staff preference.    Chronic kidney disease, stage 3  Monitor labs  Monitor I&O    Decreased UOP - on HCTZ at home. Now she seems resuscitated and has puffy arms - if giving blood today may consider lasix.    Generalized abdominal pain  Continue IV pain meds  PCA    Essential hypertension  Monitor BP  Currently holding home carvedilol and HCTZ    Type 2 diabetes mellitus without complication, with long-term current use of insulin  SSI  Accu check Q6    Consult Endocrine. Appreciate their recommendations.           Pat Turner MD  Colorectal Surgery  [facility]

## 2020-08-03 NOTE — SUBJECTIVE & OBJECTIVE
Subjective:     Interval History:   No acute events overnight. Pain well controlled, pushing PCA. NG clamped for a second trial (first time was unclamped due to nausea) and after 6 hours back to suction this morning with 100 out. No nausea this morning. Gas and stool in ostomy. Was up ambulating yesterday. [facility] who visited now COVID + so pt now on full COVID precautions with no visitors allowed. Marginal UOP overnight. Provena stopped working last night; tubing hooked to wall suction.    Post-Op Info:  Procedure(s) (LRB):  RESECTION, COLON, LOW ANTERIOR (N/A)  CYSTOSCOPY (N/A)  CATHETERIZATION, URETER (Bilateral)  CREATION, ILEOSTOMY  LYSIS, ADHESIONS   3 Days Post-Op      Medications:  Continuous Infusions:   sodium chloride 0.9% 100 mL/hr at 08/02/20 1114    hydromorphone in 0.9 % NaCl 6 mg/30 ml       Scheduled Meds:   acetaminophen  1,000 mg Oral Q8H    enoxaparin  40 mg Subcutaneous Q24H    ibuprofen  800 mg Oral Q8H    lorazepam  0.5 mg Intravenous Once    mupirocin   Nasal BID    pantoprazole  40 mg Intravenous Daily    sodium chloride 0.9%  10 mL Intravenous Q6H     PRN Meds:   sodium chloride    dextrose 50%    glucagon (human recombinant)    insulin aspart U-100    naloxone    ondansetron    ondansetron    oxyCODONE    oxyCODONE    promethazine (PHENERGAN) IVPB    sodium chloride 0.9%    sodium chloride 0.9%    traMADoL        Objective:     Vital Signs (Most Recent):  Temp: 97.1 °F (36.2 °C) (08/03/20 0334)  Pulse: 86 (08/03/20 0543)  Resp: 17 (08/03/20 0334)  BP: (!) 141/58 (08/03/20 0543)  SpO2: 99 % (08/03/20 0543) Vital Signs (24h Range):  Temp:  [96.6 °F (35.9 °C)-97.9 °F (36.6 °C)] 97.1 °F (36.2 °C)  Pulse:  [81-92] 86  Resp:  [12-18] 17  SpO2:  [94 %-100 %] 99 %  BP: ()/(50-63) 141/58     Intake/Output - Last 3 Shifts       08/01 0700 - 08/02 0659 08/02 0700 - 08/03 0659    P.O. 0 0    I.V. (mL/kg)  2641.7 (37.4)    IV Piggyback 500     Total  Intake(mL/kg) 500 (7.1) 2641.7 (37.4)    Urine (mL/kg/hr) 750 (0.4) 560 (0.3)    Drains 350 200    Other 0 0    Stool 0 195    Total Output 1100 955    Net -600 +1686.7          Stool Occurrence  1 x          Physical Exam  Constitutional:       General: She is not in acute distress.  HENT:      Head: Normocephalic.   Eyes:      Conjunctiva/sclera: Conjunctivae normal.   Neck:      Trachea: No tracheal deviation.   Cardiovascular:      Rate and Rhythm: Regular rhythm. Arms/hands are puffy  Pulmonary:      Effort: Pulmonary effort is normal. No respiratory distress.   Abdominal:      Comments: Provena in place.   Appliance on ileostomy; stoma pink. Liquid with some solid brown stool in bag and some gas.   Neurological:      Mental Status: She is alert and oriented to person, place, and time.         Significant Labs:  BMP (Last 3 Results):   Recent Labs   Lab 08/01/20  0420 08/02/20  0528 08/03/20  0330   *  295*  295* 102 84   *  133*  133* 136 138   K 4.0  4.0  4.0 4.2 3.6     104  104 106 108   CO2 22*  22*  22* 23 23   BUN 19  19  19 22 22   CREATININE 0.9  0.9  0.9 0.9 0.9   CALCIUM 7.5*  7.5*  7.5* 8.0* 7.7*   MG 1.6  1.6 2.3 1.9     CBC (Last 3 Results):   Recent Labs   Lab 08/02/20  0528 08/02/20  1312 08/03/20  0330   WBC 18.47* 16.89* 15.56*   RBC 3.06* 2.70* 2.56*   HGB 7.5* 6.6* 6.3*   HCT 23.8* 21.3* 20.2*    271 302   MCV 78* 79* 79*   MCH 24.5* 24.4* 24.6*   MCHC 31.5* 31.0* 31.2*

## 2020-08-04 LAB
ALBUMIN SERPL BCP-MCNC: 1.3 G/DL (ref 3.5–5.2)
ALP SERPL-CCNC: 106 U/L (ref 55–135)
ALT SERPL W/O P-5'-P-CCNC: 9 U/L (ref 10–44)
ANION GAP SERPL CALC-SCNC: 6 MMOL/L (ref 8–16)
AST SERPL-CCNC: 11 U/L (ref 10–40)
BASOPHILS # BLD AUTO: 0.02 K/UL (ref 0–0.2)
BASOPHILS NFR BLD: 0.2 % (ref 0–1.9)
BILIRUB SERPL-MCNC: 0.7 MG/DL (ref 0.1–1)
BUN SERPL-MCNC: 18 MG/DL (ref 8–23)
CALCIUM SERPL-MCNC: 7.6 MG/DL (ref 8.7–10.5)
CHLORIDE SERPL-SCNC: 107 MMOL/L (ref 95–110)
CO2 SERPL-SCNC: 24 MMOL/L (ref 23–29)
CREAT SERPL-MCNC: 0.8 MG/DL (ref 0.5–1.4)
CRP SERPL-MCNC: 376 MG/L (ref 0–8.2)
DIFFERENTIAL METHOD: ABNORMAL
EOSINOPHIL # BLD AUTO: 0.6 K/UL (ref 0–0.5)
EOSINOPHIL NFR BLD: 5.2 % (ref 0–8)
ERYTHROCYTE [DISTWIDTH] IN BLOOD BY AUTOMATED COUNT: 17.3 % (ref 11.5–14.5)
EST. GFR  (AFRICAN AMERICAN): >60 ML/MIN/1.73 M^2
EST. GFR  (NON AFRICAN AMERICAN): >60 ML/MIN/1.73 M^2
GLUCOSE SERPL-MCNC: 164 MG/DL (ref 70–110)
GLUCOSE SERPL-MCNC: 291 MG/DL (ref 70–110)
HCO3 UR-SCNC: 20.7 MMOL/L (ref 24–28)
HCT VFR BLD AUTO: 22.3 % (ref 37–48.5)
HCT VFR BLD CALC: 22 %PCV (ref 36–54)
HGB BLD-MCNC: 7.1 G/DL (ref 12–16)
IMM GRANULOCYTES # BLD AUTO: 0.11 K/UL (ref 0–0.04)
IMM GRANULOCYTES NFR BLD AUTO: 1 % (ref 0–0.5)
LYMPHOCYTES # BLD AUTO: 1.1 K/UL (ref 1–4.8)
LYMPHOCYTES NFR BLD: 10.2 % (ref 18–48)
MAGNESIUM SERPL-MCNC: 1.7 MG/DL (ref 1.6–2.6)
MCH RBC QN AUTO: 25.4 PG (ref 27–31)
MCHC RBC AUTO-ENTMCNC: 31.8 G/DL (ref 32–36)
MCV RBC AUTO: 80 FL (ref 82–98)
MONOCYTES # BLD AUTO: 0.5 K/UL (ref 0.3–1)
MONOCYTES NFR BLD: 4.5 % (ref 4–15)
NEUTROPHILS # BLD AUTO: 8.5 K/UL (ref 1.8–7.7)
NEUTROPHILS NFR BLD: 78.9 % (ref 38–73)
NRBC BLD-RTO: 0 /100 WBC
PCO2 BLDA: 34.1 MMHG (ref 35–45)
PH SMN: 7.39 [PH] (ref 7.35–7.45)
PHOSPHATE SERPL-MCNC: 4.1 MG/DL (ref 2.7–4.5)
PLATELET # BLD AUTO: 307 K/UL (ref 150–350)
PMV BLD AUTO: 8.3 FL (ref 9.2–12.9)
PO2 BLDA: 120 MMHG (ref 80–100)
POC BE: -4 MMOL/L
POC IONIZED CALCIUM: 1.09 MMOL/L (ref 1.06–1.42)
POC SATURATED O2: 99 % (ref 95–100)
POC TCO2: 22 MMOL/L (ref 23–27)
POCT GLUCOSE: 183 MG/DL (ref 70–110)
POCT GLUCOSE: 212 MG/DL (ref 70–110)
POCT GLUCOSE: 218 MG/DL (ref 70–110)
POCT GLUCOSE: 220 MG/DL (ref 70–110)
POCT GLUCOSE: 80 MG/DL (ref 70–110)
POTASSIUM BLD-SCNC: 4.2 MMOL/L (ref 3.5–5.1)
POTASSIUM SERPL-SCNC: 3.2 MMOL/L (ref 3.5–5.1)
PROT SERPL-MCNC: 4.9 G/DL (ref 6–8.4)
RBC # BLD AUTO: 2.8 M/UL (ref 4–5.4)
SAMPLE: ABNORMAL
SODIUM BLD-SCNC: 136 MMOL/L (ref 136–145)
SODIUM SERPL-SCNC: 137 MMOL/L (ref 136–145)
WBC # BLD AUTO: 10.8 K/UL (ref 3.9–12.7)

## 2020-08-04 PROCEDURE — 25000003 PHARM REV CODE 250: Performed by: STUDENT IN AN ORGANIZED HEALTH CARE EDUCATION/TRAINING PROGRAM

## 2020-08-04 PROCEDURE — 63600175 PHARM REV CODE 636 W HCPCS: Performed by: STUDENT IN AN ORGANIZED HEALTH CARE EDUCATION/TRAINING PROGRAM

## 2020-08-04 PROCEDURE — 85007 BL SMEAR W/DIFF WBC COUNT: CPT

## 2020-08-04 PROCEDURE — 97535 SELF CARE MNGMENT TRAINING: CPT

## 2020-08-04 PROCEDURE — 80053 COMPREHEN METABOLIC PANEL: CPT

## 2020-08-04 PROCEDURE — 85027 COMPLETE CBC AUTOMATED: CPT

## 2020-08-04 PROCEDURE — 94770 HC EXHALED C02 TEST: CPT

## 2020-08-04 PROCEDURE — 20600001 HC STEP DOWN PRIVATE ROOM

## 2020-08-04 PROCEDURE — 99232 SBSQ HOSP IP/OBS MODERATE 35: CPT | Mod: ,,, | Performed by: NURSE PRACTITIONER

## 2020-08-04 PROCEDURE — A4217 STERILE WATER/SALINE, 500 ML: HCPCS | Performed by: STUDENT IN AN ORGANIZED HEALTH CARE EDUCATION/TRAINING PROGRAM

## 2020-08-04 PROCEDURE — 63600175 PHARM REV CODE 636 W HCPCS: Performed by: NURSE PRACTITIONER

## 2020-08-04 PROCEDURE — C9113 INJ PANTOPRAZOLE SODIUM, VIA: HCPCS | Performed by: STUDENT IN AN ORGANIZED HEALTH CARE EDUCATION/TRAINING PROGRAM

## 2020-08-04 PROCEDURE — 83735 ASSAY OF MAGNESIUM: CPT

## 2020-08-04 PROCEDURE — 84100 ASSAY OF PHOSPHORUS: CPT

## 2020-08-04 PROCEDURE — 94761 N-INVAS EAR/PLS OXIMETRY MLT: CPT

## 2020-08-04 PROCEDURE — 99900035 HC TECH TIME PER 15 MIN (STAT)

## 2020-08-04 PROCEDURE — 85025 COMPLETE CBC W/AUTO DIFF WBC: CPT

## 2020-08-04 PROCEDURE — A4216 STERILE WATER/SALINE, 10 ML: HCPCS | Performed by: STUDENT IN AN ORGANIZED HEALTH CARE EDUCATION/TRAINING PROGRAM

## 2020-08-04 PROCEDURE — 99232 PR SUBSEQUENT HOSPITAL CARE,LEVL II: ICD-10-PCS | Mod: ,,, | Performed by: NURSE PRACTITIONER

## 2020-08-04 PROCEDURE — 86140 C-REACTIVE PROTEIN: CPT

## 2020-08-04 RX ORDER — INSULIN ASPART 100 [IU]/ML
1-10 INJECTION, SOLUTION INTRAVENOUS; SUBCUTANEOUS EVERY 4 HOURS PRN
Status: DISCONTINUED | OUTPATIENT
Start: 2020-08-04 | End: 2020-08-14

## 2020-08-04 RX ORDER — INSULIN ASPART 100 [IU]/ML
3 INJECTION, SOLUTION INTRAVENOUS; SUBCUTANEOUS
Status: DISCONTINUED | OUTPATIENT
Start: 2020-08-05 | End: 2020-08-05

## 2020-08-04 RX ORDER — INSULIN ASPART 100 [IU]/ML
3 INJECTION, SOLUTION INTRAVENOUS; SUBCUTANEOUS
Status: DISCONTINUED | OUTPATIENT
Start: 2020-08-04 | End: 2020-08-05

## 2020-08-04 RX ORDER — POTASSIUM CHLORIDE 7.45 MG/ML
10 INJECTION INTRAVENOUS
Status: COMPLETED | OUTPATIENT
Start: 2020-08-04 | End: 2020-08-04

## 2020-08-04 RX ORDER — GLUCAGON 1 MG
1 KIT INJECTION
Status: DISCONTINUED | OUTPATIENT
Start: 2020-08-04 | End: 2020-08-21 | Stop reason: HOSPADM

## 2020-08-04 RX ORDER — LIDOCAINE 50 MG/G
1 PATCH TOPICAL
Status: DISCONTINUED | OUTPATIENT
Start: 2020-08-04 | End: 2020-08-21 | Stop reason: HOSPADM

## 2020-08-04 RX ORDER — DEXTROSE MONOHYDRATE 100 MG/ML
INJECTION, SOLUTION INTRAVENOUS CONTINUOUS PRN
Status: DISCONTINUED | OUTPATIENT
Start: 2020-08-04 | End: 2020-08-21 | Stop reason: HOSPADM

## 2020-08-04 RX ORDER — CALCIUM CARBONATE 200(500)MG
500 TABLET,CHEWABLE ORAL 2 TIMES DAILY PRN
Status: DISCONTINUED | OUTPATIENT
Start: 2020-08-04 | End: 2020-08-21 | Stop reason: HOSPADM

## 2020-08-04 RX ORDER — SODIUM CHLORIDE AND POTASSIUM CHLORIDE 150; 450 MG/100ML; MG/100ML
INJECTION, SOLUTION INTRAVENOUS CONTINUOUS
Status: DISCONTINUED | OUTPATIENT
Start: 2020-08-04 | End: 2020-08-04

## 2020-08-04 RX ORDER — SODIUM CHLORIDE 9 MG/ML
INJECTION, SOLUTION INTRAVENOUS CONTINUOUS
Status: DISCONTINUED | OUTPATIENT
Start: 2020-08-04 | End: 2020-08-05

## 2020-08-04 RX ADMIN — INSULIN ASPART 1 UNITS: 100 INJECTION, SOLUTION INTRAVENOUS; SUBCUTANEOUS at 04:08

## 2020-08-04 RX ADMIN — MAGNESIUM SULFATE HEPTAHYDRATE: 500 INJECTION, SOLUTION INTRAMUSCULAR; INTRAVENOUS at 10:08

## 2020-08-04 RX ADMIN — ONDANSETRON 8 MG: 2 INJECTION INTRAMUSCULAR; INTRAVENOUS at 10:08

## 2020-08-04 RX ADMIN — MUPIROCIN: 20 OINTMENT TOPICAL at 10:08

## 2020-08-04 RX ADMIN — LIDOCAINE 1 PATCH: 50 PATCH TOPICAL at 05:08

## 2020-08-04 RX ADMIN — OXYCODONE HYDROCHLORIDE 10 MG: 10 TABLET ORAL at 12:08

## 2020-08-04 RX ADMIN — IBUPROFEN 800 MG: 400 TABLET, FILM COATED ORAL at 10:08

## 2020-08-04 RX ADMIN — MUPIROCIN: 20 OINTMENT TOPICAL at 08:08

## 2020-08-04 RX ADMIN — INSULIN ASPART 4 UNITS: 100 INJECTION, SOLUTION INTRAVENOUS; SUBCUTANEOUS at 09:08

## 2020-08-04 RX ADMIN — Medication 10 ML: at 06:08

## 2020-08-04 RX ADMIN — POTASSIUM CHLORIDE 10 MEQ: 7.46 INJECTION, SOLUTION INTRAVENOUS at 01:08

## 2020-08-04 RX ADMIN — INSULIN ASPART 3 UNITS: 100 INJECTION, SOLUTION INTRAVENOUS; SUBCUTANEOUS at 09:08

## 2020-08-04 RX ADMIN — ONDANSETRON 8 MG: 2 INJECTION INTRAMUSCULAR; INTRAVENOUS at 08:08

## 2020-08-04 RX ADMIN — OXYCODONE HYDROCHLORIDE 10 MG: 10 TABLET ORAL at 10:08

## 2020-08-04 RX ADMIN — IBUPROFEN 800 MG: 400 TABLET, FILM COATED ORAL at 06:08

## 2020-08-04 RX ADMIN — OXYCODONE HYDROCHLORIDE 10 MG: 10 TABLET ORAL at 08:08

## 2020-08-04 RX ADMIN — TRAMADOL HYDROCHLORIDE 50 MG: 50 TABLET, FILM COATED ORAL at 10:08

## 2020-08-04 RX ADMIN — POTASSIUM CHLORIDE 10 MEQ: 7.46 INJECTION, SOLUTION INTRAVENOUS at 11:08

## 2020-08-04 RX ADMIN — PROMETHAZINE HYDROCHLORIDE 6.25 MG: 25 INJECTION INTRAMUSCULAR; INTRAVENOUS at 02:08

## 2020-08-04 RX ADMIN — PANTOPRAZOLE SODIUM 40 MG: 40 INJECTION, POWDER, LYOPHILIZED, FOR SOLUTION INTRAVENOUS at 08:08

## 2020-08-04 RX ADMIN — INSULIN ASPART 4 UNITS: 100 INJECTION, SOLUTION INTRAVENOUS; SUBCUTANEOUS at 05:08

## 2020-08-04 RX ADMIN — ACETAMINOPHEN 1000 MG: 500 TABLET ORAL at 06:08

## 2020-08-04 RX ADMIN — INSULIN ASPART 2 UNITS: 100 INJECTION, SOLUTION INTRAVENOUS; SUBCUTANEOUS at 09:08

## 2020-08-04 RX ADMIN — Medication: at 07:08

## 2020-08-04 RX ADMIN — POTASSIUM CHLORIDE 10 MEQ: 7.46 INJECTION, SOLUTION INTRAVENOUS at 12:08

## 2020-08-04 RX ADMIN — Medication 10 ML: at 12:08

## 2020-08-04 RX ADMIN — SODIUM CHLORIDE: 0.9 INJECTION, SOLUTION INTRAVENOUS at 05:08

## 2020-08-04 RX ADMIN — OXYCODONE HYDROCHLORIDE 10 MG: 10 TABLET ORAL at 06:08

## 2020-08-04 RX ADMIN — POTASSIUM CHLORIDE 10 MEQ: 7.46 INJECTION, SOLUTION INTRAVENOUS at 10:08

## 2020-08-04 RX ADMIN — ACETAMINOPHEN 1000 MG: 500 TABLET ORAL at 10:08

## 2020-08-04 RX ADMIN — ENOXAPARIN SODIUM 40 MG: 40 INJECTION SUBCUTANEOUS at 06:08

## 2020-08-04 RX ADMIN — INSULIN ASPART 4 UNITS: 100 INJECTION, SOLUTION INTRAVENOUS; SUBCUTANEOUS at 01:08

## 2020-08-04 RX ADMIN — INSULIN ASPART 3 UNITS: 100 INJECTION, SOLUTION INTRAVENOUS; SUBCUTANEOUS at 05:08

## 2020-08-04 NOTE — CONSULTS
Consult received per for ileostomy. Pt is known to wound care team this admission for marking for planned ileostomy/colostomy. Chart reflects that pt had a creation of an ileostomy done on 7/31/20.    Received pt awake and alert sitting up in chair with nurse at bedside. PREVENA Incisional Wound vac intact to abdominal midline without . Pt reports that she is in pain and is not feeling well. Nurse aware and plans to give pt medication for pain.     Upon assessment RLQ ileostomy: Pouch intact without any leaks with flatus and brown liquid stool noted to ostomy pouch. Stoma appears to be red, moist and oval in appearance. Unable to determine if entire stoma is protruding above skin level at this time. Agreed to leave ostomy pouch intact due to pt complaining of pain and ostomy pouch being in close proximity to wound vac.    (see photo below)    Per pt, she has a PMHx of having an ostomy in the past and she liked ostomy products by [employer] with verbal permission given per pt to order starter kits. Per pt, she understands the process of applying an ostomy pouch, but may need assistance with selecting a proper pouch. Left ostomy food and reference chart in Hungarian at bedside for pt with additional ostomy supplies ordered for pt from central supply.    Plan of care discussed with pt's nurse at bedside. Will plan to follow up with pt tomorrow for additional teaching. [id number]    Ostomy pouch intact to RLQ ileostomy with midline incisional wound vac in place

## 2020-08-04 NOTE — ASSESSMENT & PLAN NOTE
Preop Hg around 8.5 - lost about 800 cc during OR. Post op Hg same as preop - likely was under-resuscitated after surgery and now has been resuscitated with a Hg down to 6.3 this morning - not tachycardic or hypotensive and she's asymptomatic    pRBC given yesterday. Hb stable at 7.1 this morning.

## 2020-08-04 NOTE — PROGRESS NOTES
[facility]  Colorectal Surgery  Progress Note    Patient Name: Azucena Morrison  MRN: [id number]  Admission Date: 7/15/2020  Hospital Length of Stay: 20 days  Attending Physician: Fabiana Valiente MD    Subjective:     Interval History:   No acute events overnight.   NG removed yesterday. Patient denies any nausea or vomiting.   Pain well controlled on PCA.   Ambulating.  Urine output back up.       Post-Op Info:  Procedure(s) (LRB):  RESECTION, COLON, LOW ANTERIOR (N/A)  CYSTOSCOPY (N/A)  CATHETERIZATION, URETER (Bilateral)  CREATION, ILEOSTOMY  LYSIS, ADHESIONS   4 Days Post-Op      Medications:  Continuous Infusions:   TPN ADULT CENTRAL LINE CUSTOM 75 mL/hr at 08/03/20 2229    TPN ADULT CENTRAL LINE CUSTOM       Scheduled Meds:   acetaminophen  1,000 mg Oral Q8H    enoxaparin  40 mg Subcutaneous Q24H    ibuprofen  800 mg Oral Q8H    lorazepam  0.5 mg Intravenous Once    mupirocin   Nasal BID    pantoprazole  40 mg Intravenous Daily    sodium chloride 0.9%  10 mL Intravenous Q6H     PRN Meds:   sodium chloride    sodium chloride    calcium carbonate    dextrose 50%    glucagon (human recombinant)    insulin aspart U-100    ondansetron    ondansetron    oxyCODONE    oxyCODONE    promethazine (PHENERGAN) IVPB    sodium chloride 0.9%    sodium chloride 0.9%    traMADoL        Objective:     Vital Signs (Most Recent):  Temp: 97.9 °F (36.6 °C) (08/04/20 0758)  Pulse: 86 (08/04/20 0758)  Resp: 17 (08/04/20 0758)  BP: 127/61 (08/04/20 0758)  SpO2: 96 % (08/04/20 0758) Vital Signs (24h Range):  Temp:  [97.2 °F (36.2 °C)-98.4 °F (36.9 °C)] 97.9 °F (36.6 °C)  Pulse:  [79-89] 86  Resp:  [10-20] 17  SpO2:  [95 %-97 %] 96 %  BP: (105-128)/(59-63) 127/61     Intake/Output - Last 3 Shifts       08/02 0700 - 08/03 0659 08/03 0700 - 08/04 0659 08/04 0700 - 08/05 0659    P.O. 0      I.V. (mL/kg) 2641.7 (37.4)      Blood  765.8     IV Piggyback       TPN  788.8     Total Intake(mL/kg)  2641.7 (37.4) 1554.6 (22)     Urine (mL/kg/hr) 560 (0.3) 2720 (1.6)     Emesis/NG output  0     Drains 200      Other 0 0     Stool 195 150     Blood  0     Total Output 955 2870     Net +1686.7 -1315.4            Urine Occurrence  3 x     Stool Occurrence 1 x 0 x     Emesis Occurrence  0 x           Physical Exam  Constitutional:       General: She is not in acute distress.  HENT:      Head: Normocephalic.   Eyes:      Conjunctiva/sclera: Conjunctivae normal.   Neck:      Trachea: No tracheal deviation.   Cardiovascular:      Rate and Rhythm: Regular rhythm. Arms/hands are puffy  Pulmonary:      Effort: Pulmonary effort is normal. No respiratory distress.   Abdominal:      Comments: Provena in place.   Appliance on ileostomy; stoma pink. Liquid with some solid brown stool in bag and some gas.   Neurological:      Mental Status: She is alert and oriented to person, place, and time.         Significant Labs:  BMP (Last 3 Results):   Recent Labs   Lab 08/02/20  0528 08/03/20  0330 08/04/20  0400    84 164*    138 137   K 4.2 3.6 3.2*    108 107   CO2 23 23 24   BUN 22 22 18   CREATININE 0.9 0.9 0.8   CALCIUM 8.0* 7.7* 7.6*   MG 2.3 1.9 1.7     CBC (Last 3 Results):   Recent Labs   Lab 08/03/20  0330 08/03/20  1236 08/04/20  0400   WBC 15.56* 16.26* 10.80   RBC 2.56* 3.10* 2.80*   HGB 6.3* 7.9* 7.1*   HCT 20.2* 24.1* 22.3*    349 307   MCV 79* 78* 80*   MCH 24.6* 25.5* 25.4*   MCHC 31.2* 32.8 31.8*         Assessment/Plan:     * Cutaneous fistula  70 y.o. female 4 Days Post-Op for Procedure(s) (LRB):  RESECTION, COLON, LOW ANTERIOR (N/A)  CYSTOSCOPY (N/A)  CATHETERIZATION, URETER (Bilateral)  CREATION, ILEOSTOMY  LYSIS, ADHESIONS    IVF at 100 cc/h  Start CLD today  Continue TPN  Switch to PO pain medications  Remove muir today  IS  OOB  PT/OT    Continue communication with use of       Acute blood loss anemia  Preop Hg around 8.5 - lost about 800 cc during OR. Post op Hg  same as preop - likely was under-resuscitated after surgery and now has been resuscitated with a Hg down to 6.3 this morning - not tachycardic or hypotensive and she's asymptomatic    pRBC given yesterday. Hb stable at 7.1 this morning.     Chronic kidney disease, stage 3  Monitor labs  Monitor I&O    Lasix given yesterday. UOP increased from yesterday.    Generalized abdominal pain  Continue IV pain meds  PCA    Essential hypertension  Monitor BP  Currently holding home carvedilol and HCTZ    Type 2 diabetes mellitus without complication, with long-term current use of insulin  SSI  Accu check Q6    Consult Endocrine. Appreciate their recommendations.           Ramonita Osorio MD  Colorectal Surgery  [facility]

## 2020-08-04 NOTE — SUBJECTIVE & OBJECTIVE
Subjective:     Interval History:   No acute events overnight.   NG removed yesterday. Patient denies any nausea or vomiting.   Pain well controlled on PCA.   Ambulating.  Urine output back up.       Post-Op Info:  Procedure(s) (LRB):  RESECTION, COLON, LOW ANTERIOR (N/A)  CYSTOSCOPY (N/A)  CATHETERIZATION, URETER (Bilateral)  CREATION, ILEOSTOMY  LYSIS, ADHESIONS   4 Days Post-Op      Medications:  Continuous Infusions:   TPN ADULT CENTRAL LINE CUSTOM 75 mL/hr at 08/03/20 2229    TPN ADULT CENTRAL LINE CUSTOM       Scheduled Meds:   acetaminophen  1,000 mg Oral Q8H    enoxaparin  40 mg Subcutaneous Q24H    ibuprofen  800 mg Oral Q8H    lorazepam  0.5 mg Intravenous Once    mupirocin   Nasal BID    pantoprazole  40 mg Intravenous Daily    sodium chloride 0.9%  10 mL Intravenous Q6H     PRN Meds:   sodium chloride    sodium chloride    calcium carbonate    dextrose 50%    glucagon (human recombinant)    insulin aspart U-100    ondansetron    ondansetron    oxyCODONE    oxyCODONE    promethazine (PHENERGAN) IVPB    sodium chloride 0.9%    sodium chloride 0.9%    traMADoL        Objective:     Vital Signs (Most Recent):  Temp: 97.9 °F (36.6 °C) (08/04/20 0758)  Pulse: 86 (08/04/20 0758)  Resp: 17 (08/04/20 0758)  BP: 127/61 (08/04/20 0758)  SpO2: 96 % (08/04/20 0758) Vital Signs (24h Range):  Temp:  [97.2 °F (36.2 °C)-98.4 °F (36.9 °C)] 97.9 °F (36.6 °C)  Pulse:  [79-89] 86  Resp:  [10-20] 17  SpO2:  [95 %-97 %] 96 %  BP: (105-128)/(59-63) 127/61     Intake/Output - Last 3 Shifts       08/02 0700 - 08/03 0659 08/03 0700 - 08/04 0659 08/04 0700 - 08/05 0659    P.O. 0      I.V. (mL/kg) 2641.7 (37.4)      Blood  765.8     IV Piggyback       TPN  788.8     Total Intake(mL/kg) 2641.7 (37.4) 1554.6 (22)     Urine (mL/kg/hr) 560 (0.3) 2720 (1.6)     Emesis/NG output  0     Drains 200      Other 0 0     Stool 195 150     Blood  0     Total Output 955 2870     Net +1686.7 -1315.4            Urine  Occurrence  3 x     Stool Occurrence 1 x 0 x     Emesis Occurrence  0 x           Physical Exam  Constitutional:       General: She is not in acute distress.  HENT:      Head: Normocephalic.   Eyes:      Conjunctiva/sclera: Conjunctivae normal.   Neck:      Trachea: No tracheal deviation.   Cardiovascular:      Rate and Rhythm: Regular rhythm. Arms/hands are puffy  Pulmonary:      Effort: Pulmonary effort is normal. No respiratory distress.   Abdominal:      Comments: Provena in place.   Appliance on ileostomy; stoma pink. Liquid with some solid brown stool in bag and some gas.   Neurological:      Mental Status: She is alert and oriented to person, place, and time.         Significant Labs:  BMP (Last 3 Results):   Recent Labs   Lab 08/02/20  0528 08/03/20  0330 08/04/20  0400    84 164*    138 137   K 4.2 3.6 3.2*    108 107   CO2 23 23 24   BUN 22 22 18   CREATININE 0.9 0.9 0.8   CALCIUM 8.0* 7.7* 7.6*   MG 2.3 1.9 1.7     CBC (Last 3 Results):   Recent Labs   Lab 08/03/20  0330 08/03/20  1236 08/04/20  0400   WBC 15.56* 16.26* 10.80   RBC 2.56* 3.10* 2.80*   HGB 6.3* 7.9* 7.1*   HCT 20.2* 24.1* 22.3*    349 307   MCV 79* 78* 80*   MCH 24.6* 25.5* 25.4*   MCHC 31.2* 32.8 31.8*

## 2020-08-04 NOTE — PLAN OF CARE
Patient A/O x4. C/o pain. Pain medications given. TPN at 75cc. PICC line in place. IVF running. Patient c/o intermittent nausea. Anti-emetics given. NPO for now. IS encouraged. Patient up in chair. Ostomy bag in place. Draining brown thin liquid. Call light placed in reach. Frequent rounding done.

## 2020-08-04 NOTE — ASSESSMENT & PLAN NOTE
70 y.o. female 4 Days Post-Op for Procedure(s) (LRB):  RESECTION, COLON, LOW ANTERIOR (N/A)  CYSTOSCOPY (N/A)  CATHETERIZATION, URETER (Bilateral)  CREATION, ILEOSTOMY  LYSIS, ADHESIONS    IVF at 100 cc/h  Start CLD today  Continue TPN  Switch to PO pain medications  Remove muir today  IS  OOB  PT/OT    Continue communication with use of

## 2020-08-04 NOTE — PLAN OF CARE
POC reviewed with patient, states understanding. AOx4. VS WDL. NPO. No complaints of nausea. Pain effectively managed per PCA. [place] to gravity, adequate UOP. Colostomy to RLQ WDL. Wound vac to ML to wall suction. Will continue to manage POC.

## 2020-08-04 NOTE — PLAN OF CARE
Patient is not medically ready for discharge.     08/04/20 1544   Discharge Reassessment   Assessment Type Discharge Planning Reassessment   Discharge Plan A Home Health   Discharge Plan B Other  (TBD)   DME Needed Upon Discharge  other (see comments)  (TBD)   Anticipated Discharge Disposition Home   Rosy Lynch RN, CM   Ext: [phone]

## 2020-08-04 NOTE — ASSESSMENT & PLAN NOTE
BG goal 140-180  TPN restarted yesterday evening and BG trending up above goal ranges. Requiring frequent prn insulin correction scale.  Expect increase insulin requirements while on TPN therapy.    Start Novolog 3 units q 4 hrs while on TPN. HOLD if TPN is stopped for any reason.   Continue Moderate Dose Correction Scale  BG monitoring q 4 hrs while on TPN (coordinate with meals during the day)     ** Please call Endocrine for any BG related issues **  ** Please call Endocrine if TPN is restarted or diet changes **    Discharge plans:  TBD

## 2020-08-04 NOTE — PROGRESS NOTES
"[facility]  Endocrinology  Progress Note    Admit Date: 7/15/2020     Reason for Consult: Management of T2DM, Hyperglycemia     Surgical Procedure and Date: n/a    Lab Results   Component Value Date    HGBA1C 6.7 (H) 07/09/2020     Diabetes diagnosis year: 2015    Home Diabetes Medications:  Lantus 10 units daily if am BG >180 and Metformin 1g BID    How often checking glucose at home? once daily   BG readings on regimen: 150-low 200s  Hypoglycemia on the regimen?  No  Missed doses on regimen?  No    Diabetes Complications include:     Hyperglycemia    Complicating diabetes co morbidities:   CKD      HPI:   Patient is a 70 y.o. female with a diagnosis of T2DM, HTN, and complicated surgical history, including h/o colon resection and Aiyana procedure, takedown colostomy, colostomy closure, clot of colo cutaneous fistula. She presnts with abdominal pain and vomiting. 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 CT scan from OSH showed partially obstructed small bowel. She was admitted to [facility]. Endocrinology consulted for management of T2DM. Of note, patient's primary language is Andorran.  An  was used for this consult.       Interval HPI:   Overnight events: Remains in [facility]. BG trending up since TPN initiated. Diet advanced to clear liquids.   Eating:   Clear liquids   Nausea: Yes  Hypoglycemia and intervention: No  Fever: No  TPN and/or TF: Yes  If yes, type of TF/TPN and rate: TPN at 75 cc/hr     /61 (BP Location: Left arm, Patient Position: Lying)   Pulse 86   Temp 97.9 °F (36.6 °C) (Oral)   Resp 16   Ht 5' 1" (1.549 m)   Wt 70.7 kg (155 lb 13.8 oz)   LMP  (LMP Unknown)   SpO2 96%   Breastfeeding No   BMI 29.45 kg/m²     Labs Reviewed and Include    Recent Labs   Lab 08/04/20  0400   *   CALCIUM 7.6*   ALBUMIN 1.3*   PROT 4.9*      K 3.2*   CO2 24      BUN 18   CREATININE 0.8   ALKPHOS 106   ALT 9* "   AST 11   BILITOT 0.7     Lab Results   Component Value Date    WBC 10.80 08/04/2020    HGB 7.1 (L) 08/04/2020    HCT 22.3 (L) 08/04/2020    MCV 80 (L) 08/04/2020     08/04/2020     No results for input(s): TSH, FREET4 in the last 168 hours.  Lab Results   Component Value Date    HGBA1C 6.7 (H) 07/09/2020       Nutritional status:   Body mass index is 29.45 kg/m².  Lab Results   Component Value Date    ALBUMIN 1.3 (L) 08/04/2020    ALBUMIN 1.4 (L) 08/03/2020    ALBUMIN 1.7 (L) 08/02/2020     Lab Results   Component Value Date    PREALBUMIN 21 07/15/2020       Estimated Creatinine Clearance: 59.7 mL/min (based on SCr of 0.8 mg/dL).    Accu-Checks  Recent Labs     08/02/20  1133 08/02/20  1618 08/02/20  2159 08/03/20  0042 08/03/20  0340 08/03/20  0848 08/03/20  1249 08/03/20  1702 08/03/20  2034 08/04/20  0357   POCTGLUCOSE 104 101 116* 103 96 96 90 93 80 183*       Current Medications and/or Treatments Impacting Glycemic Control  Immunotherapy:    Immunosuppressants     None        Steroids:   Hormones (From admission, onward)    None        Pressors:    Autonomic Drugs (From admission, onward)    None        Hyperglycemia/Diabetes Medications:   Antihyperglycemics (From admission, onward)    Start     Stop Route Frequency Ordered    08/02/20 1123  insulin aspart U-100 pen 1-10 Units      -- SubQ Every 6 hours PRN 08/02/20 1024          ASSESSMENT and PLAN    * Cutaneous fistula  Managed per primary team        Type 2 diabetes mellitus without complication, with long-term current use of insulin  BG goal 140-180  TPN restarted yesterday evening and BG trending up above goal ranges. Requiring frequent prn insulin correction scale.  Expect increase insulin requirements while on TPN therapy.    Start Novolog 3 units q 4 hrs while on TPN. HOLD if TPN is stopped for any reason.   Continue Moderate Dose Correction Scale  BG monitoring q 4 hrs while on TPN (coordinate with meals during the day)     ** Please call  Endocrine for any BG related issues **  ** Please call Endocrine if TPN is restarted or diet changes **    Discharge plans:  TBD    Chronic kidney disease, stage 3  Titrate insulin slowly  Avoid insulin stacking          Va Genao NP  Endocrinology  [facility]

## 2020-08-04 NOTE — PT/OT/SLP PROGRESS
"Physical Therapy      Patient Name:  Azucena Morrison   MRN:  [id number]    Patient not seen today secondary to Other (Comment). Pt on entry states to therapist "no not today, not today!". Therapist gestures towards Kayla system but pt continues "no, no, not today! I am nauseous, tell my nurse I need medicine for nausea." NSG alerted. PT Will follow-up per POC as appropriate.     Froylan Chacon, PTA    "

## 2020-08-04 NOTE — PLAN OF CARE
Problem: Occupational Therapy Goal  Goal: Occupational Therapy Goal  Description: Goals to be met by: 8/11/20     Patient will increase functional independence with ADLs by performing:    Feeding with [place].  UE Dressing with [place].  LE Dressing with Stand-by Assistance.  Grooming while standing at sink with Supervision.  Toileting from toilet with Supervision for hygiene and clothing management.   Bathing from  standing at sink with Supervision.  Toilet transfer to toilet with Supervision.    Outcome: Ongoing, Progressing       Continue POC    WELLINGTON Godinez  8/4/2020   Pager #: [phone]

## 2020-08-04 NOTE — SUBJECTIVE & OBJECTIVE
"Interval HPI:   Overnight events: Remains in [facility]. BG trending up since TPN initiated. Diet advanced to clear liquids.   Eating:   Clear liquids   Nausea: Yes  Hypoglycemia and intervention: No  Fever: No  TPN and/or TF: Yes  If yes, type of TF/TPN and rate: TPN at 75 cc/hr     /61 (BP Location: Left arm, Patient Position: Lying)   Pulse 86   Temp 97.9 °F (36.6 °C) (Oral)   Resp 16   Ht 5' 1" (1.549 m)   Wt 70.7 kg (155 lb 13.8 oz)   LMP  (LMP Unknown)   SpO2 96%   Breastfeeding No   BMI 29.45 kg/m²     Labs Reviewed and Include    Recent Labs   Lab 08/04/20  0400   *   CALCIUM 7.6*   ALBUMIN 1.3*   PROT 4.9*      K 3.2*   CO2 24      BUN 18   CREATININE 0.8   ALKPHOS 106   ALT 9*   AST 11   BILITOT 0.7     Lab Results   Component Value Date    WBC 10.80 08/04/2020    HGB 7.1 (L) 08/04/2020    HCT 22.3 (L) 08/04/2020    MCV 80 (L) 08/04/2020     08/04/2020     No results for input(s): TSH, FREET4 in the last 168 hours.  Lab Results   Component Value Date    HGBA1C 6.7 (H) 07/09/2020       Nutritional status:   Body mass index is 29.45 kg/m².  Lab Results   Component Value Date    ALBUMIN 1.3 (L) 08/04/2020    ALBUMIN 1.4 (L) 08/03/2020    ALBUMIN 1.7 (L) 08/02/2020     Lab Results   Component Value Date    PREALBUMIN 21 07/15/2020       Estimated Creatinine Clearance: 59.7 mL/min (based on SCr of 0.8 mg/dL).    Accu-Checks  Recent Labs     08/02/20  1133 08/02/20  1618 08/02/20  2159 08/03/20  0042 08/03/20  0340 08/03/20  0848 08/03/20  1249 08/03/20  1702 08/03/20  2034 08/04/20  0357   POCTGLUCOSE 104 101 116* 103 96 96 90 93 80 183*       Current Medications and/or Treatments Impacting Glycemic Control  Immunotherapy:    Immunosuppressants     None        Steroids:   Hormones (From admission, onward)    None        Pressors:    Autonomic Drugs (From admission, onward)    None        Hyperglycemia/Diabetes Medications:   Antihyperglycemics (From admission, onward)    " Start     Stop Route Frequency Ordered    08/02/20 1123  insulin aspart U-100 pen 1-10 Units      -- SubQ Every 6 hours PRN 08/02/20 1024

## 2020-08-04 NOTE — PT/OT/SLP PROGRESS
"Occupational Therapy   Treatment    Name: Azucena Morrison  MRN: [id number]  Admitting Diagnosis:  Cutaneous fistula  4 Days Post-Op    Recommendations:     Discharge Recommendations: home health OT  Discharge Equipment Recommendations:  (TBD)  Barriers to discharge:  None    Assessment:     Azucena Morrison is a 70 y.o. female with a medical diagnosis of Cutaneous fistula.  She presents with good motivation and participation in ADL focused session. Pt required CGA and no AD for ADLs and mobility this date. She had 1 LOB standing at the sink requiring min A to correct.  Performance deficits affecting function are weakness, impaired endurance, impaired self care skills, impaired functional mobilty, gait instability, impaired balance, decreased safety awareness, pain, impaired cardiopulmonary response to activity. Pt would benefit from skilled OT services in order to maximize independence with ADLs and facilitate safe discharge. Pt would benefit from home health OT once medically stable for discharge.    Rehab Prognosis:  Good; patient would benefit from acute skilled OT services to address these deficits and reach maximum level of function.       Kayla utilized during session- Reference #:[id number]  Plan:     Patient to be seen 3 x/week to address the above listed problems via self-care/home management, therapeutic activities, therapeutic exercises  · Plan of Care Expires: 08/31/20  · Plan of Care Reviewed with: patient    Subjective     Pain/Comfort:  · Pain Rating 1: ("a little" abdominal pain)    Objective:     Communicated with: RN prior to session.  Patient found HOB elevated with PICC line, wound vac, SCD upon OT entry to room.    General Precautions: Standard, fall   Orthopedic Precautions:N/A   Braces: N/A     Occupational Performance:     Bed Mobility:    · Patient completed Rolling/Turning to Left with  contact guard assistance  · Patient completed Scooting/Bridging with contact guard assistance  · Patient " completed Supine to Sit with contact guard assistance     Functional Mobility/Transfers:  · Patient completed Sit <> Stand Transfer with contact guard assistance  with  no assistive device   · Patient completed Toilet Transfer Step Transfer technique with contact guard assistance with  grab bars  · Functional Mobility: Pt ambulated ~12 frt x2 trials (bed<>bathroom) with CGA in order to maximize functional activity tolerance and standing balance required for engagement in occupations of choice.  No LOB, SOB, or c/o of dizziness. VCs for line management    Activities of Daily Living:  · Grooming: contact guard assistance standing balance at sink to perform oral hygiene and wash face  · Bathing: contact guard assistance standing balance to bathe standing at the sink  · Toileting: contact guard assistance clothing management; VCs given for safety and line management   · Pt with 1 LOB standing at sink performing ADLs requiring min A to correct      AMPAC 6 Click ADL: 19    Treatment & Education:  -Therapist provided facilitation and instruction of proper body mechanics, energy conservation, and fall prevention strategies during tasks listed above.  -Pt educated on role of OT, POC and goals for therapy  -Pt educated on importance of OOB activities with staff member assistance and sitting OOB majority of the day.   -Pt verbalized understanding. Pt expressed no further concerns/questions  -Whiteboard updated    Patient left up in chair with all lines intact, call button in reach and nursing notifiedEducation:      GOALS:   Multidisciplinary Problems     Occupational Therapy Goals        Problem: Occupational Therapy Goal    Goal Priority Disciplines Outcome Interventions   Occupational Therapy Goal     OT, PT/OT Ongoing, Progressing    Description: Goals to be met by: 8/11/20     Patient will increase functional independence with ADLs by performing:    Feeding with [place].  UE Dressing with [place].  LE Dressing  with Stand-by Assistance.  Grooming while standing at sink with Supervision.  Toileting from toilet with Supervision for hygiene and clothing management.   Bathing from  standing at sink with Supervision.  Toilet transfer to toilet with Supervision.                     Time Tracking:     OT Date of Treatment: 08/04/20  OT Start Time: 1017  OT Stop Time: 1040  OT Total Time (min): 23 min    Billable Minutes:Self Care/Home Management 23    Trinh Loyola OT  8/4/2020

## 2020-08-05 LAB
ALBUMIN SERPL BCP-MCNC: 1.2 G/DL (ref 3.5–5.2)
ALP SERPL-CCNC: 173 U/L (ref 55–135)
ALT SERPL W/O P-5'-P-CCNC: 15 U/L (ref 10–44)
ANION GAP SERPL CALC-SCNC: 5 MMOL/L (ref 8–16)
ANISOCYTOSIS BLD QL SMEAR: SLIGHT
AST SERPL-CCNC: 22 U/L (ref 10–40)
BACTERIA #/AREA URNS AUTO: ABNORMAL /HPF
BASOPHILS # BLD AUTO: 0.01 K/UL (ref 0–0.2)
BASOPHILS # BLD AUTO: ABNORMAL K/UL (ref 0–0.2)
BASOPHILS NFR BLD: 0 % (ref 0–1.9)
BASOPHILS NFR BLD: 0.1 % (ref 0–1.9)
BILIRUB SERPL-MCNC: 0.5 MG/DL (ref 0.1–1)
BILIRUB UR QL STRIP: NEGATIVE
BUN SERPL-MCNC: 13 MG/DL (ref 8–23)
BURR CELLS BLD QL SMEAR: ABNORMAL
CALCIUM SERPL-MCNC: 7.8 MG/DL (ref 8.7–10.5)
CHLORIDE SERPL-SCNC: 105 MMOL/L (ref 95–110)
CLARITY UR REFRACT.AUTO: ABNORMAL
CO2 SERPL-SCNC: 25 MMOL/L (ref 23–29)
COLOR UR AUTO: YELLOW
CREAT SERPL-MCNC: 0.8 MG/DL (ref 0.5–1.4)
CRP SERPL-MCNC: 299.3 MG/L (ref 0–8.2)
DIFFERENTIAL METHOD: ABNORMAL
DIFFERENTIAL METHOD: ABNORMAL
EOSINOPHIL # BLD AUTO: 0.2 K/UL (ref 0–0.5)
EOSINOPHIL # BLD AUTO: ABNORMAL K/UL (ref 0–0.5)
EOSINOPHIL NFR BLD: 3 % (ref 0–8)
EOSINOPHIL NFR BLD: 3.1 % (ref 0–8)
ERYTHROCYTE [DISTWIDTH] IN BLOOD BY AUTOMATED COUNT: 18 % (ref 11.5–14.5)
ERYTHROCYTE [DISTWIDTH] IN BLOOD BY AUTOMATED COUNT: 18.1 % (ref 11.5–14.5)
EST. GFR  (AFRICAN AMERICAN): >60 ML/MIN/1.73 M^2
EST. GFR  (NON AFRICAN AMERICAN): >60 ML/MIN/1.73 M^2
GIANT PLATELETS BLD QL SMEAR: PRESENT
GLUCOSE SERPL-MCNC: 225 MG/DL (ref 70–110)
GLUCOSE UR QL STRIP: ABNORMAL
HCT VFR BLD AUTO: 20.3 % (ref 37–48.5)
HCT VFR BLD AUTO: 21.7 % (ref 37–48.5)
HGB BLD-MCNC: 6.5 G/DL (ref 12–16)
HGB BLD-MCNC: 7 G/DL (ref 12–16)
HGB UR QL STRIP: ABNORMAL
HYALINE CASTS UR QL AUTO: 0 /LPF
HYPOCHROMIA BLD QL SMEAR: ABNORMAL
IMM GRANULOCYTES # BLD AUTO: 0.24 K/UL (ref 0–0.04)
IMM GRANULOCYTES # BLD AUTO: ABNORMAL K/UL (ref 0–0.04)
IMM GRANULOCYTES NFR BLD AUTO: 3.5 % (ref 0–0.5)
IMM GRANULOCYTES NFR BLD AUTO: ABNORMAL % (ref 0–0.5)
KETONES UR QL STRIP: NEGATIVE
LEUKOCYTE ESTERASE UR QL STRIP: ABNORMAL
LYMPHOCYTES # BLD AUTO: 1.2 K/UL (ref 1–4.8)
LYMPHOCYTES # BLD AUTO: ABNORMAL K/UL (ref 1–4.8)
LYMPHOCYTES NFR BLD: 15 % (ref 18–48)
LYMPHOCYTES NFR BLD: 16.8 % (ref 18–48)
MAGNESIUM SERPL-MCNC: 1.7 MG/DL (ref 1.6–2.6)
MCH RBC QN AUTO: 25.1 PG (ref 27–31)
MCH RBC QN AUTO: 25.3 PG (ref 27–31)
MCHC RBC AUTO-ENTMCNC: 32 G/DL (ref 32–36)
MCHC RBC AUTO-ENTMCNC: 32.3 G/DL (ref 32–36)
MCV RBC AUTO: 78 FL (ref 82–98)
MCV RBC AUTO: 78 FL (ref 82–98)
METAMYELOCYTES NFR BLD MANUAL: 2 %
MICROSCOPIC COMMENT: ABNORMAL
MONOCYTES # BLD AUTO: 0.4 K/UL (ref 0.3–1)
MONOCYTES # BLD AUTO: ABNORMAL K/UL (ref 0.3–1)
MONOCYTES NFR BLD: 6 % (ref 4–15)
MONOCYTES NFR BLD: 6.1 % (ref 4–15)
NEUTROPHILS # BLD AUTO: 4.8 K/UL (ref 1.8–7.7)
NEUTROPHILS NFR BLD: 70.4 % (ref 38–73)
NEUTROPHILS NFR BLD: 72 % (ref 38–73)
NEUTS BAND NFR BLD MANUAL: 2 %
NITRITE UR QL STRIP: POSITIVE
NRBC BLD-RTO: 0 /100 WBC
NRBC BLD-RTO: 0 /100 WBC
OVALOCYTES BLD QL SMEAR: ABNORMAL
PH UR STRIP: 6 [PH] (ref 5–8)
PHOSPHATE SERPL-MCNC: 3 MG/DL (ref 2.7–4.5)
PLATELET # BLD AUTO: 309 K/UL (ref 150–350)
PLATELET # BLD AUTO: 339 K/UL (ref 150–350)
PLATELET BLD QL SMEAR: ABNORMAL
PMV BLD AUTO: 8.5 FL (ref 9.2–12.9)
PMV BLD AUTO: 8.7 FL (ref 9.2–12.9)
POCT GLUCOSE: 189 MG/DL (ref 70–110)
POCT GLUCOSE: 193 MG/DL (ref 70–110)
POCT GLUCOSE: 207 MG/DL (ref 70–110)
POCT GLUCOSE: 230 MG/DL (ref 70–110)
POCT GLUCOSE: 230 MG/DL (ref 70–110)
POCT GLUCOSE: 236 MG/DL (ref 70–110)
POIKILOCYTOSIS BLD QL SMEAR: SLIGHT
POLYCHROMASIA BLD QL SMEAR: ABNORMAL
POTASSIUM SERPL-SCNC: 3.6 MMOL/L (ref 3.5–5.1)
PROT SERPL-MCNC: 4.9 G/DL (ref 6–8.4)
PROT UR QL STRIP: ABNORMAL
RBC # BLD AUTO: 2.59 M/UL (ref 4–5.4)
RBC # BLD AUTO: 2.77 M/UL (ref 4–5.4)
RBC #/AREA URNS AUTO: 12 /HPF (ref 0–4)
SARS-COV-2 RNA RESP QL NAA+PROBE: NOT DETECTED
SODIUM SERPL-SCNC: 135 MMOL/L (ref 136–145)
SP GR UR STRIP: 1.02 (ref 1–1.03)
SQUAMOUS #/AREA URNS AUTO: 1 /HPF
TARGETS BLD QL SMEAR: ABNORMAL
URN SPEC COLLECT METH UR: ABNORMAL
WBC # BLD AUTO: 6.84 K/UL (ref 3.9–12.7)
WBC # BLD AUTO: 7.58 K/UL (ref 3.9–12.7)
WBC #/AREA URNS AUTO: >100 /HPF (ref 0–5)

## 2020-08-05 PROCEDURE — 63600175 PHARM REV CODE 636 W HCPCS: Performed by: STUDENT IN AN ORGANIZED HEALTH CARE EDUCATION/TRAINING PROGRAM

## 2020-08-05 PROCEDURE — 87186 SC STD MICRODIL/AGAR DIL: CPT

## 2020-08-05 PROCEDURE — 25000003 PHARM REV CODE 250: Performed by: STUDENT IN AN ORGANIZED HEALTH CARE EDUCATION/TRAINING PROGRAM

## 2020-08-05 PROCEDURE — 99232 SBSQ HOSP IP/OBS MODERATE 35: CPT | Mod: ,,, | Performed by: NURSE PRACTITIONER

## 2020-08-05 PROCEDURE — 87040 BLOOD CULTURE FOR BACTERIA: CPT

## 2020-08-05 PROCEDURE — U0003 INFECTIOUS AGENT DETECTION BY NUCLEIC ACID (DNA OR RNA); SEVERE ACUTE RESPIRATORY SYNDROME CORONAVIRUS 2 (SARS-COV-2) (CORONAVIRUS DISEASE [COVID-19]), AMPLIFIED PROBE TECHNIQUE, MAKING USE OF HIGH THROUGHPUT TECHNOLOGIES AS DESCRIBED BY CMS-2020-01-R: HCPCS

## 2020-08-05 PROCEDURE — 63600175 PHARM REV CODE 636 W HCPCS: Performed by: NURSE PRACTITIONER

## 2020-08-05 PROCEDURE — A4216 STERILE WATER/SALINE, 10 ML: HCPCS | Performed by: STUDENT IN AN ORGANIZED HEALTH CARE EDUCATION/TRAINING PROGRAM

## 2020-08-05 PROCEDURE — 83735 ASSAY OF MAGNESIUM: CPT

## 2020-08-05 PROCEDURE — 80053 COMPREHEN METABOLIC PANEL: CPT

## 2020-08-05 PROCEDURE — 86140 C-REACTIVE PROTEIN: CPT

## 2020-08-05 PROCEDURE — 97530 THERAPEUTIC ACTIVITIES: CPT | Mod: CQ

## 2020-08-05 PROCEDURE — C9113 INJ PANTOPRAZOLE SODIUM, VIA: HCPCS | Performed by: STUDENT IN AN ORGANIZED HEALTH CARE EDUCATION/TRAINING PROGRAM

## 2020-08-05 PROCEDURE — 99232 PR SUBSEQUENT HOSPITAL CARE,LEVL II: ICD-10-PCS | Mod: ,,, | Performed by: NURSE PRACTITIONER

## 2020-08-05 PROCEDURE — 85025 COMPLETE CBC W/AUTO DIFF WBC: CPT

## 2020-08-05 PROCEDURE — 87086 URINE CULTURE/COLONY COUNT: CPT

## 2020-08-05 PROCEDURE — 84100 ASSAY OF PHOSPHORUS: CPT

## 2020-08-05 PROCEDURE — 20600001 HC STEP DOWN PRIVATE ROOM

## 2020-08-05 PROCEDURE — 97803 MED NUTRITION INDIV SUBSEQ: CPT

## 2020-08-05 PROCEDURE — A4217 STERILE WATER/SALINE, 500 ML: HCPCS | Performed by: STUDENT IN AN ORGANIZED HEALTH CARE EDUCATION/TRAINING PROGRAM

## 2020-08-05 PROCEDURE — 87088 URINE BACTERIA CULTURE: CPT

## 2020-08-05 PROCEDURE — 81001 URINALYSIS AUTO W/SCOPE: CPT

## 2020-08-05 PROCEDURE — 36415 COLL VENOUS BLD VENIPUNCTURE: CPT

## 2020-08-05 PROCEDURE — 87077 CULTURE AEROBIC IDENTIFY: CPT

## 2020-08-05 PROCEDURE — 97116 GAIT TRAINING THERAPY: CPT | Mod: CQ

## 2020-08-05 RX ORDER — INSULIN ASPART 100 [IU]/ML
6 INJECTION, SOLUTION INTRAVENOUS; SUBCUTANEOUS
Status: DISCONTINUED | OUTPATIENT
Start: 2020-08-06 | End: 2020-08-05

## 2020-08-05 RX ORDER — INSULIN ASPART 100 [IU]/ML
6 INJECTION, SOLUTION INTRAVENOUS; SUBCUTANEOUS
Status: DISCONTINUED | OUTPATIENT
Start: 2020-08-05 | End: 2020-08-05

## 2020-08-05 RX ORDER — CIPROFLOXACIN 500 MG/1
500 TABLET ORAL EVERY 12 HOURS
Status: DISCONTINUED | OUTPATIENT
Start: 2020-08-05 | End: 2020-08-06

## 2020-08-05 RX ORDER — INSULIN ASPART 100 [IU]/ML
7 INJECTION, SOLUTION INTRAVENOUS; SUBCUTANEOUS
Status: DISCONTINUED | OUTPATIENT
Start: 2020-08-05 | End: 2020-08-06

## 2020-08-05 RX ORDER — INSULIN ASPART 100 [IU]/ML
7 INJECTION, SOLUTION INTRAVENOUS; SUBCUTANEOUS
Status: DISCONTINUED | OUTPATIENT
Start: 2020-08-06 | End: 2020-08-06

## 2020-08-05 RX ORDER — MAGNESIUM SULFATE HEPTAHYDRATE 40 MG/ML
2 INJECTION, SOLUTION INTRAVENOUS ONCE
Status: COMPLETED | OUTPATIENT
Start: 2020-08-05 | End: 2020-08-05

## 2020-08-05 RX ORDER — CIPROFLOXACIN 500 MG/1
500 TABLET ORAL EVERY 12 HOURS
Status: DISCONTINUED | OUTPATIENT
Start: 2020-08-05 | End: 2020-08-05

## 2020-08-05 RX ORDER — PANTOPRAZOLE SODIUM 40 MG/1
40 TABLET, DELAYED RELEASE ORAL DAILY
Status: DISCONTINUED | OUTPATIENT
Start: 2020-08-06 | End: 2020-08-06

## 2020-08-05 RX ORDER — HYDROCHLOROTHIAZIDE 12.5 MG/1
12.5 TABLET ORAL DAILY
Status: DISCONTINUED | OUTPATIENT
Start: 2020-08-06 | End: 2020-08-06

## 2020-08-05 RX ORDER — CARVEDILOL 25 MG/1
25 TABLET ORAL DAILY
Status: DISCONTINUED | OUTPATIENT
Start: 2020-08-05 | End: 2020-08-06

## 2020-08-05 RX ORDER — POTASSIUM CHLORIDE 7.45 MG/ML
10 INJECTION INTRAVENOUS
Status: DISPENSED | OUTPATIENT
Start: 2020-08-05 | End: 2020-08-05

## 2020-08-05 RX ADMIN — IBUPROFEN 800 MG: 400 TABLET, FILM COATED ORAL at 09:08

## 2020-08-05 RX ADMIN — OXYCODONE HYDROCHLORIDE 10 MG: 10 TABLET ORAL at 01:08

## 2020-08-05 RX ADMIN — INSULIN ASPART 6 UNITS: 100 INJECTION, SOLUTION INTRAVENOUS; SUBCUTANEOUS at 01:08

## 2020-08-05 RX ADMIN — MAGNESIUM SULFATE HEPTAHYDRATE: 500 INJECTION, SOLUTION INTRAMUSCULAR; INTRAVENOUS at 09:08

## 2020-08-05 RX ADMIN — OXYCODONE HYDROCHLORIDE 10 MG: 10 TABLET ORAL at 05:08

## 2020-08-05 RX ADMIN — OXYCODONE HYDROCHLORIDE 10 MG: 10 TABLET ORAL at 04:08

## 2020-08-05 RX ADMIN — PROMETHAZINE HYDROCHLORIDE 6.25 MG: 25 INJECTION INTRAMUSCULAR; INTRAVENOUS at 01:08

## 2020-08-05 RX ADMIN — INSULIN ASPART 7 UNITS: 100 INJECTION, SOLUTION INTRAVENOUS; SUBCUTANEOUS at 04:08

## 2020-08-05 RX ADMIN — Medication 10 ML: at 12:08

## 2020-08-05 RX ADMIN — LIDOCAINE 1 PATCH: 50 PATCH TOPICAL at 05:08

## 2020-08-05 RX ADMIN — INSULIN ASPART 7 UNITS: 100 INJECTION, SOLUTION INTRAVENOUS; SUBCUTANEOUS at 09:08

## 2020-08-05 RX ADMIN — ENOXAPARIN SODIUM 40 MG: 40 INJECTION SUBCUTANEOUS at 04:08

## 2020-08-05 RX ADMIN — INSULIN ASPART 2 UNITS: 100 INJECTION, SOLUTION INTRAVENOUS; SUBCUTANEOUS at 09:08

## 2020-08-05 RX ADMIN — INSULIN ASPART 4 UNITS: 100 INJECTION, SOLUTION INTRAVENOUS; SUBCUTANEOUS at 04:08

## 2020-08-05 RX ADMIN — INSULIN ASPART 2 UNITS: 100 INJECTION, SOLUTION INTRAVENOUS; SUBCUTANEOUS at 01:08

## 2020-08-05 RX ADMIN — POTASSIUM CHLORIDE 10 MEQ: 7.46 INJECTION, SOLUTION INTRAVENOUS at 05:08

## 2020-08-05 RX ADMIN — INSULIN ASPART 2 UNITS: 100 INJECTION, SOLUTION INTRAVENOUS; SUBCUTANEOUS at 10:08

## 2020-08-05 RX ADMIN — INSULIN ASPART 6 UNITS: 100 INJECTION, SOLUTION INTRAVENOUS; SUBCUTANEOUS at 10:08

## 2020-08-05 RX ADMIN — ACETAMINOPHEN 1000 MG: 500 TABLET ORAL at 01:08

## 2020-08-05 RX ADMIN — ACETAMINOPHEN 1000 MG: 500 TABLET ORAL at 09:08

## 2020-08-05 RX ADMIN — PANTOPRAZOLE SODIUM 40 MG: 40 INJECTION, POWDER, LYOPHILIZED, FOR SOLUTION INTRAVENOUS at 10:08

## 2020-08-05 RX ADMIN — INSULIN ASPART 3 UNITS: 100 INJECTION, SOLUTION INTRAVENOUS; SUBCUTANEOUS at 04:08

## 2020-08-05 RX ADMIN — IBUPROFEN 800 MG: 400 TABLET, FILM COATED ORAL at 01:08

## 2020-08-05 RX ADMIN — IBUPROFEN 800 MG: 400 TABLET, FILM COATED ORAL at 05:08

## 2020-08-05 RX ADMIN — CARVEDILOL 25 MG: 25 TABLET, FILM COATED ORAL at 05:08

## 2020-08-05 RX ADMIN — ONDANSETRON 8 MG: 2 INJECTION INTRAMUSCULAR; INTRAVENOUS at 04:08

## 2020-08-05 RX ADMIN — OXYCODONE HYDROCHLORIDE 10 MG: 10 TABLET ORAL at 10:08

## 2020-08-05 RX ADMIN — INSULIN ASPART 3 UNITS: 100 INJECTION, SOLUTION INTRAVENOUS; SUBCUTANEOUS at 12:08

## 2020-08-05 RX ADMIN — MAGNESIUM SULFATE IN WATER 2 G: 40 INJECTION, SOLUTION INTRAVENOUS at 01:08

## 2020-08-05 RX ADMIN — INSULIN ASPART 4 UNITS: 100 INJECTION, SOLUTION INTRAVENOUS; SUBCUTANEOUS at 12:08

## 2020-08-05 RX ADMIN — OXYCODONE HYDROCHLORIDE 10 MG: 10 TABLET ORAL at 09:08

## 2020-08-05 RX ADMIN — Medication 10 ML: at 06:08

## 2020-08-05 RX ADMIN — ACETAMINOPHEN 1000 MG: 500 TABLET ORAL at 05:08

## 2020-08-05 RX ADMIN — CIPROFLOXACIN 500 MG: 500 TABLET, FILM COATED ORAL at 05:08

## 2020-08-05 RX ADMIN — POTASSIUM CHLORIDE 10 MEQ: 7.46 INJECTION, SOLUTION INTRAVENOUS at 04:08

## 2020-08-05 RX ADMIN — ONDANSETRON 8 MG: 2 INJECTION INTRAMUSCULAR; INTRAVENOUS at 10:08

## 2020-08-05 RX ADMIN — MUPIROCIN: 20 OINTMENT TOPICAL at 10:08

## 2020-08-05 RX ADMIN — POTASSIUM CHLORIDE 10 MEQ: 7.46 INJECTION, SOLUTION INTRAVENOUS at 01:08

## 2020-08-05 RX ADMIN — TRAMADOL HYDROCHLORIDE 50 MG: 50 TABLET, FILM COATED ORAL at 09:08

## 2020-08-05 RX ADMIN — ONDANSETRON 4 MG: 2 INJECTION INTRAMUSCULAR; INTRAVENOUS at 05:08

## 2020-08-05 NOTE — ASSESSMENT & PLAN NOTE
Preop Hg around 8.5 - lost about 800 cc during OR. Post op Hg same as preop - likely was under-resuscitated after surgery and now has been resuscitated with a Hg down to 6.3 this morning - not tachycardic or hypotensive and she's asymptomatic

## 2020-08-05 NOTE — PT/OT/SLP PROGRESS
Physical Therapy Treatment    Patient Name:  Azucena Morrison   MRN:  [id number]    Recommendations:     Discharge Recommendations:  home health PT   Discharge Equipment Recommendations: (TBD)   Barriers to discharge: decreased functional mobility    Assessment:     Azucena Morrison is a 70 y.o. female admitted with a medical diagnosis of Cutaneous fistula.  She presents with the following impairments/functional limitations:  weakness, impaired endurance, impaired self care skills, impaired functional mobilty, gait instability, impaired balance, decreased safety awareness, impaired cardiopulmonary response to activity, pain. Pt tolerated session fairly with focus on bed mobility, transfers, and gait training. Session limited d/t space restrictions with pt quarantined to room while on Covid rule-out. Pt agreeable to gait trials to/from bathroom only for pt to urinate. Pt agreeable to transfer and remain UIC. Good participation in seated therex. Pt will continue to benefit from therapy services to address impairments listed above.     Rehab Prognosis: Good; patient would benefit from acute skilled PT services to address these deficits and reach maximum level of function.    Recent Surgery: Procedure(s) (LRB):  RESECTION, COLON, LOW ANTERIOR (N/A)  CYSTOSCOPY (N/A)  CATHETERIZATION, URETER (Bilateral)  CREATION, ILEOSTOMY  LYSIS, ADHESIONS 5 Days Post-Op    Plan:     During this hospitalization, patient to be seen 3 x/week to address the identified rehab impairments via gait training, therapeutic activities, therapeutic exercises, neuromuscular re-education and progress toward the following goals:    · Plan of Care Expires:  08/29/20    Subjective     Chief Complaint: pain  Pain/Comfort:  · Pain Rating 1: 10/10  · Location - Orientation 1: generalized  · Location 1: abdomen(reported as pain around ostomy site)  · Pain Addressed 1: Reposition, Distraction, Pre-medicate for activity      Objective:     Communicated with NSYAMINI  prior to session.  Patient found HOB elevated with PICC line, SCD, peripheral IV upon PTA entry to room.   [employer] system used to translate this session,  code not transcribed d/t isolation gear.     General Precautions: Standard, fall   Orthopedic Precautions:N/A   Braces: N/A     Functional Mobility:  · Bed Mobility:     · Rolling Left:  contact guard assistance  · Supine to Sit: minimum assistance  · Transfers:     · Sit to Stand:  contact guard assistance with hand-held assist  · Toilet Transfer: stand by assistance with  no AD  using  Step Transfer  · Gait: Pt ambulates within room ~10ft x 2 trials ft to/from bathroom for toileting. Pt intially with CGA/HHA but advances to SBA with no AD. Pt with quick, rushed antolin. Lack of safety awareness including lack of line awareness requiring therapist intervention to prevent pulling out of IVs.       AM-PAC 6 CLICK MOBILITY  Turning over in bed (including adjusting bedclothes, sheets and blankets)?: 3  Sitting down on and standing up from a chair with arms (e.g., wheelchair, bedside commode, etc.): 3  Moving from lying on back to sitting on the side of the bed?: 3  Moving to and from a bed to a chair (including a wheelchair)?: 3  Need to walk in hospital room?: 3  Climbing 3-5 steps with a railing?: 2  Basic Mobility Total Score: 17       Therapeutic Activities and Exercises:   Pt assisted with functional mobility as noted above.   Pt performs seated BLE therex with AAROM as needed d/t pain. AP, LAQ, Seated Marching performed x 20 reps.   Eduction via [employer] on importance of pt increasing mobility, transferring OOB to chair frequently, and performing BLE seated therex when UIC. Pt v/u and agreeable.     Patient left up in chair with all lines intact and call button in reach.    GOALS:   Multidisciplinary Problems     Physical Therapy Goals        Problem: Physical Therapy Goal    Goal Priority Disciplines Outcome Goal Variances Interventions   Physical  Therapy Goal     PT, PT/OT Ongoing, Progressing     Description: Goals to be met by: 20     Patient will increase functional independence with mobility by performin. Supine to sit with Contact Guard Assistance.  2. Sit to supine with Modified [place].  3. Sit to stand transfer with Modified [place].  4. Bed to chair transfer with Stand-by Assistance using Rolling Walker PRN.  5. Gait  x 100 feet with Stand-by Assistance using Rolling Walker PRN.    6. Ascend/descend 4 stair with bilateral Handrails Stand-by Assistance.   7. Lower extremity exercise program x 20 reps per handout, with assistance as needed.                     Time Tracking:     PT Received On: 20  PT Start Time: 1428     PT Stop Time: 1457  PT Total Time (min): 29 min     Billable Minutes: Gait Training 12 and Therapeutic Activity 17    Treatment Type: Treatment  PT/PTA: PTA     PTA Visit Number: 1     Froylan Chacon, YAIR  2020

## 2020-08-05 NOTE — ASSESSMENT & PLAN NOTE
BG goal 140-180  TPN infusing at 75 cc/hr and BG trending up above goal ranges. Requiring frequent prn insulin correction scale.  Expect increase insulin requirements while on TPN therapy.    Increase Novolog to 7 units q 4 hrs while on TPN. HOLD if TPN is stopped for any reason.   Continue Moderate Dose Correction Scale  BG monitoring q 4 hrs while on TPN (coordinate with meals during the day)     ** Please call Endocrine for any BG related issues **  ** Please call Endocrine if TPN is restarted or diet changes **    Discharge plans:  TBD

## 2020-08-05 NOTE — ASSESSMENT & PLAN NOTE
70 y.o. female 5 Days Post-Op for Procedure(s) (LRB):  RESECTION, COLON, LOW ANTERIOR (N/A)  CYSTOSCOPY (N/A)  CATHETERIZATION, URETER (Bilateral)  CREATION, ILEOSTOMY  LYSIS, ADHESIONS    WBC WNL. Follow up labs for infectious workup.   IVF at 100 cc/h  Start CLD today  Continue TPN  Switch to PO pain medications  IS  OOB  PT/OT    Continue communication with use of

## 2020-08-05 NOTE — PROGRESS NOTES
Follow up for ileostomy.    Spoke with pt's nurse prior to seeing pt, who reports that the pt is being ruled out for COVID and that the attending team removed the incisional wound vac and changed her ostomy pouch today. Nurse reports that pt assisted with ostomy bag change and the pt's stoma protrudes above skin level to the medial side and is slightly retracted to the lateral side of the stoma site. Agreed to leave ostomy pouch intact today.    Received pt awake and alert lying in bed watching tv. Dressing dry and intact to abdominal midline with ostomy pouch intact without any leaks noted with well liquid brown stool noted to bag.    Pt reports that she is still having pain and that ostomy bag was changed. Additional educational literature regarding ileostomy care was given to pt with the following topics:   What is an ileostomy, pouch emptying, sizing/cuting pouch, and applying pouch   Stoma and chauncey-stomal care   Food choices and hydration; dealing with obstructions.      Discussed importance of hydration and increasing fluid intake. Pt verbalized understanding of this. Pt understands process of ordering supplies and did give verbal permission to have starter kits from [employer] and [facility]. Encouraged pt to read literature and food guide when she is feeling better. and will follow up with patient tomorrow.  Offered patient to have any family member attend a training session if needed and will contact a  to assist with additional teaching needs. Wound care will continue to follow pt PRN for continued ileostomy teaching. [id number]

## 2020-08-05 NOTE — PROGRESS NOTES
[facility]  Colorectal Surgery  Progress Note    Patient Name: Azucena Morrison  MRN: [id number]  Admission Date: 7/15/2020  Hospital Length of Stay: 21 days  Attending Physician: Fabiana Valiente MD    Subjective:     Interval History:   Tmax 101.1F overnight. Infectious workup initiated.   Nausea yesterday afternoon, patient made NPO. Patient feels much better this morning. Denies nausea/vomiting.  Pain well controlled.   Ambulating.   Liquid brown stool in bag.   Voiding appropriately.      Post-Op Info:  Procedure(s) (LRB):  RESECTION, COLON, LOW ANTERIOR (N/A)  CYSTOSCOPY (N/A)  CATHETERIZATION, URETER (Bilateral)  CREATION, ILEOSTOMY  LYSIS, ADHESIONS   5 Days Post-Op      Medications:  Continuous Infusions:   sodium chloride 0.9% 100 mL/hr at 08/04/20 1745    dextrose 10 % in water (D10W)      TPN ADULT CENTRAL LINE CUSTOM 75 mL/hr at 08/04/20 2218     Scheduled Meds:   acetaminophen  1,000 mg Oral Q8H    enoxaparin  40 mg Subcutaneous Q24H    ibuprofen  800 mg Oral Q8H    [START ON 8/6/2020] insulin aspart U-100  6 Units Subcutaneous Q24H    [START ON 8/6/2020] insulin aspart U-100  6 Units Subcutaneous Q24H    insulin aspart U-100  6 Units Subcutaneous Q24H    insulin aspart U-100  6 Units Subcutaneous Q24H    insulin aspart U-100  6 Units Subcutaneous Q24H    insulin aspart U-100  6 Units Subcutaneous Q24H    lidocaine  1 patch Transdermal Q24H    lorazepam  0.5 mg Intravenous Once    mupirocin   Nasal BID    pantoprazole  40 mg Intravenous Daily    sodium chloride 0.9%  10 mL Intravenous Q6H     PRN Meds:   sodium chloride    sodium chloride    calcium carbonate    dextrose 10 % in water (D10W)    dextrose 50%    glucagon (human recombinant)    insulin aspart U-100    ondansetron    ondansetron    oxyCODONE    oxyCODONE    promethazine (PHENERGAN) IVPB    sodium chloride 0.9%    sodium chloride 0.9%    traMADoL        Objective:     Vital Signs (Most  Recent):  Temp: 98.2 °F (36.8 °C) (08/05/20 0424)  Pulse: 92 (08/05/20 0424)  Resp: 18 (08/05/20 0510)  BP: (!) 145/74 (08/05/20 0424)  SpO2: 96 % (08/05/20 0424) Vital Signs (24h Range):  Temp:  [97.4 °F (36.3 °C)-101.1 °F (38.4 °C)] 98.2 °F (36.8 °C)  Pulse:  [] 92  Resp:  [10-19] 18  SpO2:  [93 %-98 %] 96 %  BP: (119-170)/(59-79) 145/74     Intake/Output - Last 3 Shifts       08/03 0700 - 08/04 0659 08/04 0700 - 08/05 0659 08/05 0700 - 08/06 0659    P.O.  60     I.V. (mL/kg)  1000 (14.1)     Blood 765.8      IV Piggyback  100     .8 551.3     Total Intake(mL/kg) 1554.6 (22) 1711.3 (24.2)     Urine (mL/kg/hr) 2720 (1.6) 1050 (0.6)     Emesis/NG output 0      Drains       Other 0 0     Stool 150 50     Blood 0      Total Output 2870 1100     Net -1315.4 +611.3            Urine Occurrence 3 x 3 x     Stool Occurrence 0 x 1 x     Emesis Occurrence 0 x            Physical Exam  Constitutional:       General: She is not in acute distress.  HENT:      Head: Normocephalic.   Eyes:      Conjunctiva/sclera: Conjunctivae normal.   Neck:      Trachea: No tracheal deviation.   Cardiovascular:      Rate and Rhythm: Regular rhythm.   Pulmonary:      Effort: Pulmonary effort is normal. No respiratory distress.   Abdominal:      Comments: Provena in place.   Appliance on ileostomy; stoma pink. Liquid with some solid brown stool in bag and some gas.   Neurological:      Mental Status: She is alert and oriented to person, place, and time.         Significant Labs:  BMP (Last 3 Results):   Recent Labs   Lab 08/03/20  0330 08/04/20  0400 08/05/20  0300 08/05/20  0430   GLU 84 164*  --  225*    137  --  135*   K 3.6 3.2*  --  3.6    107  --  105   CO2 23 24  --  25   BUN 22 18  --  13   CREATININE 0.9 0.8  --  0.8   CALCIUM 7.7* 7.6*  --  7.8*   MG 1.9 1.7 1.7  --      CBC (Last 3 Results):   Recent Labs   Lab 08/04/20  0400 08/04/20  2254 08/05/20  0430   WBC 10.80 7.58 6.84   RBC 2.80* 2.77* 2.59*   HGB 7.1*  7.0* 6.5*   HCT 22.3* 21.7* 20.3*    339 309   MCV 80* 78* 78*   MCH 25.4* 25.3* 25.1*   MCHC 31.8* 32.3 32.0         Assessment/Plan:     * Cutaneous fistula  70 y.o. female 5 Days Post-Op for Procedure(s) (LRB):  RESECTION, COLON, LOW ANTERIOR (N/A)  CYSTOSCOPY (N/A)  CATHETERIZATION, URETER (Bilateral)  CREATION, ILEOSTOMY  LYSIS, ADHESIONS    WBC WNL. Follow up labs for infectious workup.   IVF at 100 cc/h  Start CLD today  Continue TPN  Switch to PO pain medications  IS  OOB  PT/OT    Continue communication with use of       Acute blood loss anemia  Preop Hg around 8.5 - lost about 800 cc during OR. Post op Hg same as preop - likely was under-resuscitated after surgery and now has been resuscitated with a Hg down to 6.3 this morning - not tachycardic or hypotensive and she's asymptomatic      Chronic kidney disease, stage 3  Monitor labs  Monitor I&O    Lasix given yesterday. UOP increased from yesterday.    Generalized abdominal pain  Continue IV pain meds  PCA    Essential hypertension  Monitor BP  Currently holding home carvedilol and HCTZ    Type 2 diabetes mellitus without complication, with long-term current use of insulin  SSI  Accu check Q6    Consult Endocrine. Appreciate their recommendations.           Ramonita Osorio MD  Colorectal Surgery  [facility]

## 2020-08-05 NOTE — PLAN OF CARE
Plan of Care reviewed w/ pt. AVSS on RA. COVID swab done this AM, result negative, precautions d/c'ed. Abdominal wound VAC removed this AM per MD, midline incision w/ staples covered w/ telfa dressing. RLQ ileostomy w/ scant output, bag changed this shift. TPN infusing via L PICC. Advanced to clear liquid diet, tolerating small amt PO. Emesis x1, PRN Zofran and Phenergan w/ some effect. Voiding in bathroom w/ x1 assist. Q4 accuchecks, scheduled and PRN insulin per orders. PRN Oxycodone for pain w/ some effect, H/H 6.5/20.3, team aware, holding transfusion for now.

## 2020-08-05 NOTE — PROGRESS NOTES
"[facility]  Endocrinology  Progress Note    Admit Date: 7/15/2020     Reason for Consult: Management of T2DM, Hyperglycemia     Surgical Procedure and Date: n/a    Lab Results   Component Value Date    HGBA1C 6.7 (H) 07/09/2020     Diabetes diagnosis year: 2015    Home Diabetes Medications:  Lantus 10 units daily if am BG >180 and Metformin 1g BID    How often checking glucose at home? once daily   BG readings on regimen: 150-low 200s  Hypoglycemia on the regimen?  No  Missed doses on regimen?  No    Diabetes Complications include:     Hyperglycemia    Complicating diabetes co morbidities:   CKD      HPI:   Patient is a 70 y.o. female with a diagnosis of T2DM, HTN, and complicated surgical history, including h/o colon resection and Aiyana procedure, takedown colostomy, colostomy closure, clot of colo cutaneous fistula. She presnts with abdominal pain and vomiting. 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 CT scan from OSH showed partially obstructed small bowel. She was admitted to [facility]. Endocrinology consulted for management of T2DM. Of note, patient's primary language is Urdu.  An  was used for this consult.       Interval HPI:   Overnight events: Remains in [facility]. NAEON. BG slightly above goal ranges on current SQ insulin regimen. Remains on TPN.   Eating:   clear liquids  Nausea: No  Hypoglycemia and intervention: No  Fever: Yes, 101.1  TPN and/or TF: Yes  If yes, type of TF/TPN and rate: TPN at 75 cc/hr     BP (!) 145/74 (BP Location: Right arm, Patient Position: Lying)   Pulse 92   Temp 98.2 °F (36.8 °C) (Oral)   Resp 18   Ht 5' 1" (1.549 m)   Wt 70.7 kg (155 lb 13.8 oz)   LMP  (LMP Unknown)   SpO2 96%   Breastfeeding No   BMI 29.45 kg/m²     Labs Reviewed and Include    Recent Labs   Lab 08/05/20  0430   *   CALCIUM 7.8*   ALBUMIN 1.2*   PROT 4.9*   *   K 3.6   CO2 25      BUN 13   CREATININE " 0.8   ALKPHOS 173*   ALT 15   AST 22   BILITOT 0.5     Lab Results   Component Value Date    WBC 6.84 08/05/2020    HGB 6.5 (L) 08/05/2020    HCT 20.3 (L) 08/05/2020    MCV 78 (L) 08/05/2020     08/05/2020     No results for input(s): TSH, FREET4 in the last 168 hours.  Lab Results   Component Value Date    HGBA1C 6.7 (H) 07/09/2020       Nutritional status:   Body mass index is 29.45 kg/m².  Lab Results   Component Value Date    ALBUMIN 1.2 (L) 08/05/2020    ALBUMIN 1.3 (L) 08/04/2020    ALBUMIN 1.4 (L) 08/03/2020     Lab Results   Component Value Date    PREALBUMIN 21 07/15/2020       Estimated Creatinine Clearance: 59.7 mL/min (based on SCr of 0.8 mg/dL).    Accu-Checks  Recent Labs     08/03/20  1249 08/03/20  1702 08/03/20  2034 08/04/20  0357 08/04/20  0933 08/04/20  1352 08/04/20  1756 08/04/20  2154 08/05/20  0041 08/05/20  0421   POCTGLUCOSE 90 93 80 183* 218* 212* 230* 220* 236* 230*       Current Medications and/or Treatments Impacting Glycemic Control  Immunotherapy:    Immunosuppressants     None        Steroids:   Hormones (From admission, onward)    None        Pressors:    Autonomic Drugs (From admission, onward)    None        Hyperglycemia/Diabetes Medications:   Antihyperglycemics (From admission, onward)    Start     Stop Route Frequency Ordered    08/05/20 1200  insulin aspart U-100 pen 3 Units      -- SubQ Every 24 hours (non-standard times) 08/04/20 1523    08/05/20 0800  insulin aspart U-100 pen 3 Units      -- SubQ Every 24 hours (non-standard times) 08/04/20 1523    08/05/20 0400  insulin aspart U-100 pen 3 Units      -- SubQ Every 24 hours (non-standard times) 08/04/20 1523    08/05/20 0000  insulin aspart U-100 pen 3 Units      -- SubQ Every 24 hours (non-standard times) 08/04/20 1523    08/04/20 2000  insulin aspart U-100 pen 3 Units      -- SubQ Every 24 hours (non-standard times) 08/04/20 1523    08/04/20 1600  insulin aspart U-100 pen 3 Units      -- SubQ Every 24 hours  (non-standard times) 08/04/20 1523    08/04/20 1409  insulin aspart U-100 pen 1-10 Units      -- SubQ Every 4 hours PRN 08/04/20 1310          ASSESSMENT and PLAN    * Cutaneous fistula  Managed per primary team        Type 2 diabetes mellitus without complication, with long-term current use of insulin  BG goal 140-180  TPN infusing at 75 cc/hr and BG trending up above goal ranges. Requiring frequent prn insulin correction scale.  Expect increase insulin requirements while on TPN therapy.    Increase Novolog to 7 units q 4 hrs while on TPN. HOLD if TPN is stopped for any reason.   Continue Moderate Dose Correction Scale  BG monitoring q 4 hrs while on TPN (coordinate with meals during the day)     ** Please call Endocrine for any BG related issues **  ** Please call Endocrine if TPN is restarted or diet changes **    Discharge plans:  TBD    Chronic kidney disease, stage 3  Titrate insulin slowly  Avoid insulin stacking          Va Genao NP  Endocrinology  [facility]

## 2020-08-05 NOTE — SUBJECTIVE & OBJECTIVE
"Interval HPI:   Overnight events: Remains in [facility]. ZIGGY. BG slightly above goal ranges on current SQ insulin regimen. Remains on TPN.   Eating:   clear liquids  Nausea: No  Hypoglycemia and intervention: No  Fever: Yes, 101.1  TPN and/or TF: Yes  If yes, type of TF/TPN and rate: TPN at 75 cc/hr     BP (!) 145/74 (BP Location: Right arm, Patient Position: Lying)   Pulse 92   Temp 98.2 °F (36.8 °C) (Oral)   Resp 18   Ht 5' 1" (1.549 m)   Wt 70.7 kg (155 lb 13.8 oz)   LMP  (LMP Unknown)   SpO2 96%   Breastfeeding No   BMI 29.45 kg/m²     Labs Reviewed and Include    Recent Labs   Lab 08/05/20  0430   *   CALCIUM 7.8*   ALBUMIN 1.2*   PROT 4.9*   *   K 3.6   CO2 25      BUN 13   CREATININE 0.8   ALKPHOS 173*   ALT 15   AST 22   BILITOT 0.5     Lab Results   Component Value Date    WBC 6.84 08/05/2020    HGB 6.5 (L) 08/05/2020    HCT 20.3 (L) 08/05/2020    MCV 78 (L) 08/05/2020     08/05/2020     No results for input(s): TSH, FREET4 in the last 168 hours.  Lab Results   Component Value Date    HGBA1C 6.7 (H) 07/09/2020       Nutritional status:   Body mass index is 29.45 kg/m².  Lab Results   Component Value Date    ALBUMIN 1.2 (L) 08/05/2020    ALBUMIN 1.3 (L) 08/04/2020    ALBUMIN 1.4 (L) 08/03/2020     Lab Results   Component Value Date    PREALBUMIN 21 07/15/2020       Estimated Creatinine Clearance: 59.7 mL/min (based on SCr of 0.8 mg/dL).    Accu-Checks  Recent Labs     08/03/20  1249 08/03/20  1702 08/03/20  2034 08/04/20  0357 08/04/20  0933 08/04/20  1352 08/04/20  1756 08/04/20  2154 08/05/20  0041 08/05/20  0421   POCTGLUCOSE 90 93 80 183* 218* 212* 230* 220* 236* 230*       Current Medications and/or Treatments Impacting Glycemic Control  Immunotherapy:    Immunosuppressants     None        Steroids:   Hormones (From admission, onward)    None        Pressors:    Autonomic Drugs (From admission, onward)    None        Hyperglycemia/Diabetes Medications: "   Antihyperglycemics (From admission, onward)    Start     Stop Route Frequency Ordered    08/05/20 1200  insulin aspart U-100 pen 3 Units      -- SubQ Every 24 hours (non-standard times) 08/04/20 1523    08/05/20 0800  insulin aspart U-100 pen 3 Units      -- SubQ Every 24 hours (non-standard times) 08/04/20 1523    08/05/20 0400  insulin aspart U-100 pen 3 Units      -- SubQ Every 24 hours (non-standard times) 08/04/20 1523    08/05/20 0000  insulin aspart U-100 pen 3 Units      -- SubQ Every 24 hours (non-standard times) 08/04/20 1523    08/04/20 2000  insulin aspart U-100 pen 3 Units      -- SubQ Every 24 hours (non-standard times) 08/04/20 1523    08/04/20 1600  insulin aspart U-100 pen 3 Units      -- SubQ Every 24 hours (non-standard times) 08/04/20 1523    08/04/20 1409  insulin aspart U-100 pen 1-10 Units      -- SubQ Every 4 hours PRN 08/04/20 1310

## 2020-08-05 NOTE — SUBJECTIVE & OBJECTIVE
Subjective:     Interval History:   Tmax 101.1F overnight. Infectious workup initiated.   Nausea yesterday afternoon, patient made NPO. Patient feels much better this morning. Denies nausea/vomiting.  Pain well controlled.   Ambulating.   Liquid brown stool in bag.       Post-Op Info:  Procedure(s) (LRB):  RESECTION, COLON, LOW ANTERIOR (N/A)  CYSTOSCOPY (N/A)  CATHETERIZATION, URETER (Bilateral)  CREATION, ILEOSTOMY  LYSIS, ADHESIONS   5 Days Post-Op      Medications:  Continuous Infusions:   sodium chloride 0.9% 100 mL/hr at 08/04/20 1745    dextrose 10 % in water (D10W)      TPN ADULT CENTRAL LINE CUSTOM 75 mL/hr at 08/04/20 2218     Scheduled Meds:   acetaminophen  1,000 mg Oral Q8H    enoxaparin  40 mg Subcutaneous Q24H    ibuprofen  800 mg Oral Q8H    [START ON 8/6/2020] insulin aspart U-100  6 Units Subcutaneous Q24H    [START ON 8/6/2020] insulin aspart U-100  6 Units Subcutaneous Q24H    insulin aspart U-100  6 Units Subcutaneous Q24H    insulin aspart U-100  6 Units Subcutaneous Q24H    insulin aspart U-100  6 Units Subcutaneous Q24H    insulin aspart U-100  6 Units Subcutaneous Q24H    lidocaine  1 patch Transdermal Q24H    lorazepam  0.5 mg Intravenous Once    mupirocin   Nasal BID    pantoprazole  40 mg Intravenous Daily    sodium chloride 0.9%  10 mL Intravenous Q6H     PRN Meds:   sodium chloride    sodium chloride    calcium carbonate    dextrose 10 % in water (D10W)    dextrose 50%    glucagon (human recombinant)    insulin aspart U-100    ondansetron    ondansetron    oxyCODONE    oxyCODONE    promethazine (PHENERGAN) IVPB    sodium chloride 0.9%    sodium chloride 0.9%    traMADoL        Objective:     Vital Signs (Most Recent):  Temp: 98.2 °F (36.8 °C) (08/05/20 0424)  Pulse: 92 (08/05/20 0424)  Resp: 18 (08/05/20 0510)  BP: (!) 145/74 (08/05/20 0424)  SpO2: 96 % (08/05/20 0424) Vital Signs (24h Range):  Temp:  [97.4 °F (36.3 °C)-101.1 °F (38.4 °C)] 98.2 °F (36.8  °C)  Pulse:  [] 92  Resp:  [10-19] 18  SpO2:  [93 %-98 %] 96 %  BP: (119-170)/(59-79) 145/74     Intake/Output - Last 3 Shifts       08/03 0700 - 08/04 0659 08/04 0700 - 08/05 0659 08/05 0700 - 08/06 0659    P.O.  60     I.V. (mL/kg)  1000 (14.1)     Blood 765.8      IV Piggyback  100     .8 551.3     Total Intake(mL/kg) 1554.6 (22) 1711.3 (24.2)     Urine (mL/kg/hr) 2720 (1.6) 1050 (0.6)     Emesis/NG output 0      Drains       Other 0 0     Stool 150 50     Blood 0      Total Output 2870 1100     Net -1315.4 +611.3            Urine Occurrence 3 x 3 x     Stool Occurrence 0 x 1 x     Emesis Occurrence 0 x            Physical Exam  Constitutional:       General: She is not in acute distress.  HENT:      Head: Normocephalic.   Eyes:      Conjunctiva/sclera: Conjunctivae normal.   Neck:      Trachea: No tracheal deviation.   Cardiovascular:      Rate and Rhythm: Regular rhythm.   Pulmonary:      Effort: Pulmonary effort is normal. No respiratory distress.   Abdominal:      Comments: Provena in place.   Appliance on ileostomy; stoma pink. Liquid with some solid brown stool in bag and some gas.   Neurological:      Mental Status: She is alert and oriented to person, place, and time.         Significant Labs:  BMP (Last 3 Results):   Recent Labs   Lab 08/03/20  0330 08/04/20  0400 08/05/20  0300 08/05/20  0430   GLU 84 164*  --  225*    137  --  135*   K 3.6 3.2*  --  3.6    107  --  105   CO2 23 24  --  25   BUN 22 18  --  13   CREATININE 0.9 0.8  --  0.8   CALCIUM 7.7* 7.6*  --  7.8*   MG 1.9 1.7 1.7  --      CBC (Last 3 Results):   Recent Labs   Lab 08/04/20  0400 08/04/20  2254 08/05/20  0430   WBC 10.80 7.58 6.84   RBC 2.80* 2.77* 2.59*   HGB 7.1* 7.0* 6.5*   HCT 22.3* 21.7* 20.3*    339 309   MCV 80* 78* 78*   MCH 25.4* 25.3* 25.1*   MCHC 31.8* 32.3 32.0

## 2020-08-06 PROBLEM — Z78.9 ON TOTAL PARENTERAL NUTRITION (TPN): Status: ACTIVE | Noted: 2020-08-06

## 2020-08-06 PROBLEM — N39.0 UTI (URINARY TRACT INFECTION): Status: ACTIVE | Noted: 2020-08-06

## 2020-08-06 LAB
ALBUMIN SERPL BCP-MCNC: 1.2 G/DL (ref 3.5–5.2)
ALP SERPL-CCNC: 336 U/L (ref 55–135)
ALT SERPL W/O P-5'-P-CCNC: 34 U/L (ref 10–44)
ANION GAP SERPL CALC-SCNC: 5 MMOL/L (ref 8–16)
APPEARANCE FLD: NORMAL
AST SERPL-CCNC: 50 U/L (ref 10–40)
BASOPHILS # BLD AUTO: 0.02 K/UL (ref 0–0.2)
BASOPHILS NFR BLD: 0.2 % (ref 0–1.9)
BILIRUB SERPL-MCNC: 0.6 MG/DL (ref 0.1–1)
BODY FLD TYPE: NORMAL
BUN SERPL-MCNC: 12 MG/DL (ref 8–23)
CALCIUM SERPL-MCNC: 8.5 MG/DL (ref 8.7–10.5)
CHLORIDE SERPL-SCNC: 103 MMOL/L (ref 95–110)
CO2 SERPL-SCNC: 28 MMOL/L (ref 23–29)
COLOR FLD: YELLOW
CREAT SERPL-MCNC: 0.8 MG/DL (ref 0.5–1.4)
CRP SERPL-MCNC: 289.5 MG/L (ref 0–8.2)
DIFFERENTIAL METHOD: ABNORMAL
EOSINOPHIL # BLD AUTO: 0.4 K/UL (ref 0–0.5)
EOSINOPHIL NFR BLD: 3.6 % (ref 0–8)
ERYTHROCYTE [DISTWIDTH] IN BLOOD BY AUTOMATED COUNT: 18.9 % (ref 11.5–14.5)
EST. GFR  (AFRICAN AMERICAN): >60 ML/MIN/1.73 M^2
EST. GFR  (NON AFRICAN AMERICAN): >60 ML/MIN/1.73 M^2
GLUCOSE SERPL-MCNC: 183 MG/DL (ref 70–110)
GRAM STN SPEC: NORMAL
GRAM STN SPEC: NORMAL
HCT VFR BLD AUTO: 21.7 % (ref 37–48.5)
HGB BLD-MCNC: 6.9 G/DL (ref 12–16)
IMM GRANULOCYTES # BLD AUTO: 0.42 K/UL (ref 0–0.04)
IMM GRANULOCYTES NFR BLD AUTO: 4 % (ref 0–0.5)
LYMPHOCYTES # BLD AUTO: 1.3 K/UL (ref 1–4.8)
LYMPHOCYTES NFR BLD: 12.4 % (ref 18–48)
LYMPHOCYTES NFR FLD MANUAL: 4 %
MAGNESIUM SERPL-MCNC: 1.9 MG/DL (ref 1.6–2.6)
MCH RBC QN AUTO: 25.2 PG (ref 27–31)
MCHC RBC AUTO-ENTMCNC: 31.8 G/DL (ref 32–36)
MCV RBC AUTO: 79 FL (ref 82–98)
MONOCYTES # BLD AUTO: 0.7 K/UL (ref 0.3–1)
MONOCYTES NFR BLD: 7 % (ref 4–15)
MONOS+MACROS NFR FLD MANUAL: 13 %
NEUTROPHILS # BLD AUTO: 7.7 K/UL (ref 1.8–7.7)
NEUTROPHILS NFR BLD: 72.8 % (ref 38–73)
NEUTROPHILS NFR FLD MANUAL: 83 %
NRBC BLD-RTO: 0 /100 WBC
PHOSPHATE SERPL-MCNC: 3.2 MG/DL (ref 2.7–4.5)
PLATELET # BLD AUTO: 355 K/UL (ref 150–350)
PMV BLD AUTO: 8.5 FL (ref 9.2–12.9)
POCT GLUCOSE: 185 MG/DL (ref 70–110)
POCT GLUCOSE: 197 MG/DL (ref 70–110)
POCT GLUCOSE: 198 MG/DL (ref 70–110)
POCT GLUCOSE: 202 MG/DL (ref 70–110)
POCT GLUCOSE: 217 MG/DL (ref 70–110)
POCT GLUCOSE: 222 MG/DL (ref 70–110)
POCT GLUCOSE: 233 MG/DL (ref 70–110)
POTASSIUM SERPL-SCNC: 4.7 MMOL/L (ref 3.5–5.1)
PROT SERPL-MCNC: 5.5 G/DL (ref 6–8.4)
RBC # BLD AUTO: 2.74 M/UL (ref 4–5.4)
SODIUM SERPL-SCNC: 136 MMOL/L (ref 136–145)
TRIGL SERPL-MCNC: 253 MG/DL (ref 30–150)
WBC # BLD AUTO: 10.54 K/UL (ref 3.9–12.7)
WBC # FLD: 4742 /CU MM

## 2020-08-06 PROCEDURE — 63600175 PHARM REV CODE 636 W HCPCS: Performed by: STUDENT IN AN ORGANIZED HEALTH CARE EDUCATION/TRAINING PROGRAM

## 2020-08-06 PROCEDURE — A4216 STERILE WATER/SALINE, 10 ML: HCPCS | Performed by: STUDENT IN AN ORGANIZED HEALTH CARE EDUCATION/TRAINING PROGRAM

## 2020-08-06 PROCEDURE — 99232 SBSQ HOSP IP/OBS MODERATE 35: CPT | Mod: ,,, | Performed by: NURSE PRACTITIONER

## 2020-08-06 PROCEDURE — 87077 CULTURE AEROBIC IDENTIFY: CPT

## 2020-08-06 PROCEDURE — 87205 SMEAR GRAM STAIN: CPT

## 2020-08-06 PROCEDURE — 25000003 PHARM REV CODE 250: Performed by: STUDENT IN AN ORGANIZED HEALTH CARE EDUCATION/TRAINING PROGRAM

## 2020-08-06 PROCEDURE — 86140 C-REACTIVE PROTEIN: CPT

## 2020-08-06 PROCEDURE — 99232 PR SUBSEQUENT HOSPITAL CARE,LEVL II: ICD-10-PCS | Mod: ,,, | Performed by: NURSE PRACTITIONER

## 2020-08-06 PROCEDURE — 87186 SC STD MICRODIL/AGAR DIL: CPT

## 2020-08-06 PROCEDURE — 84100 ASSAY OF PHOSPHORUS: CPT

## 2020-08-06 PROCEDURE — 80053 COMPREHEN METABOLIC PANEL: CPT

## 2020-08-06 PROCEDURE — A4217 STERILE WATER/SALINE, 500 ML: HCPCS | Performed by: COLON & RECTAL SURGERY

## 2020-08-06 PROCEDURE — 25500020 PHARM REV CODE 255: Performed by: COLON & RECTAL SURGERY

## 2020-08-06 PROCEDURE — 87102 FUNGUS ISOLATION CULTURE: CPT

## 2020-08-06 PROCEDURE — 89051 BODY FLUID CELL COUNT: CPT

## 2020-08-06 PROCEDURE — 85025 COMPLETE CBC W/AUTO DIFF WBC: CPT

## 2020-08-06 PROCEDURE — C9113 INJ PANTOPRAZOLE SODIUM, VIA: HCPCS | Performed by: STUDENT IN AN ORGANIZED HEALTH CARE EDUCATION/TRAINING PROGRAM

## 2020-08-06 PROCEDURE — 87075 CULTR BACTERIA EXCEPT BLOOD: CPT

## 2020-08-06 PROCEDURE — 87070 CULTURE OTHR SPECIMN AEROBIC: CPT

## 2020-08-06 PROCEDURE — 20600001 HC STEP DOWN PRIVATE ROOM

## 2020-08-06 PROCEDURE — 84478 ASSAY OF TRIGLYCERIDES: CPT

## 2020-08-06 PROCEDURE — 25500020 PHARM REV CODE 255: Performed by: STUDENT IN AN ORGANIZED HEALTH CARE EDUCATION/TRAINING PROGRAM

## 2020-08-06 PROCEDURE — B4185 PARENTERAL SOL 10 GM LIPIDS: HCPCS | Performed by: STUDENT IN AN ORGANIZED HEALTH CARE EDUCATION/TRAINING PROGRAM

## 2020-08-06 PROCEDURE — 83735 ASSAY OF MAGNESIUM: CPT

## 2020-08-06 PROCEDURE — 25000003 PHARM REV CODE 250: Performed by: COLON & RECTAL SURGERY

## 2020-08-06 PROCEDURE — 63600175 PHARM REV CODE 636 W HCPCS: Performed by: COLON & RECTAL SURGERY

## 2020-08-06 RX ORDER — INSULIN ASPART 100 [IU]/ML
11 INJECTION, SOLUTION INTRAVENOUS; SUBCUTANEOUS
Status: DISCONTINUED | OUTPATIENT
Start: 2020-08-07 | End: 2020-08-07

## 2020-08-06 RX ORDER — METOPROLOL TARTRATE 1 MG/ML
5 INJECTION, SOLUTION INTRAVENOUS EVERY 6 HOURS
Status: DISCONTINUED | OUTPATIENT
Start: 2020-08-06 | End: 2020-08-14

## 2020-08-06 RX ORDER — INSULIN ASPART 100 [IU]/ML
9 INJECTION, SOLUTION INTRAVENOUS; SUBCUTANEOUS
Status: DISCONTINUED | OUTPATIENT
Start: 2020-08-07 | End: 2020-08-06

## 2020-08-06 RX ORDER — INSULIN ASPART 100 [IU]/ML
11 INJECTION, SOLUTION INTRAVENOUS; SUBCUTANEOUS
Status: DISCONTINUED | OUTPATIENT
Start: 2020-08-06 | End: 2020-08-07

## 2020-08-06 RX ORDER — INSULIN ASPART 100 [IU]/ML
9 INJECTION, SOLUTION INTRAVENOUS; SUBCUTANEOUS
Status: DISCONTINUED | OUTPATIENT
Start: 2020-08-06 | End: 2020-08-06

## 2020-08-06 RX ORDER — HYDROMORPHONE HYDROCHLORIDE 1 MG/ML
0.5 INJECTION, SOLUTION INTRAMUSCULAR; INTRAVENOUS; SUBCUTANEOUS EVERY 4 HOURS PRN
Status: DISCONTINUED | OUTPATIENT
Start: 2020-08-06 | End: 2020-08-21 | Stop reason: HOSPADM

## 2020-08-06 RX ORDER — CIPROFLOXACIN 2 MG/ML
400 INJECTION, SOLUTION INTRAVENOUS
Status: DISCONTINUED | OUTPATIENT
Start: 2020-08-06 | End: 2020-08-07

## 2020-08-06 RX ORDER — ACETAMINOPHEN 10 MG/ML
1000 INJECTION, SOLUTION INTRAVENOUS EVERY 8 HOURS
Status: COMPLETED | OUTPATIENT
Start: 2020-08-06 | End: 2020-08-07

## 2020-08-06 RX ORDER — HYDROMORPHONE HYDROCHLORIDE 1 MG/ML
1 INJECTION, SOLUTION INTRAMUSCULAR; INTRAVENOUS; SUBCUTANEOUS EVERY 4 HOURS PRN
Status: DISCONTINUED | OUTPATIENT
Start: 2020-08-06 | End: 2020-08-21 | Stop reason: HOSPADM

## 2020-08-06 RX ORDER — MIDAZOLAM HYDROCHLORIDE 1 MG/ML
INJECTION INTRAMUSCULAR; INTRAVENOUS CODE/TRAUMA/SEDATION MEDICATION
Status: COMPLETED | OUTPATIENT
Start: 2020-08-06 | End: 2020-08-06

## 2020-08-06 RX ORDER — ONDANSETRON 2 MG/ML
INJECTION INTRAMUSCULAR; INTRAVENOUS CODE/TRAUMA/SEDATION MEDICATION
Status: COMPLETED | OUTPATIENT
Start: 2020-08-06 | End: 2020-08-06

## 2020-08-06 RX ORDER — PANTOPRAZOLE SODIUM 40 MG/10ML
40 INJECTION, POWDER, LYOPHILIZED, FOR SOLUTION INTRAVENOUS EVERY 24 HOURS
Status: DISCONTINUED | OUTPATIENT
Start: 2020-08-06 | End: 2020-08-21 | Stop reason: HOSPADM

## 2020-08-06 RX ORDER — FENTANYL CITRATE 50 UG/ML
INJECTION, SOLUTION INTRAMUSCULAR; INTRAVENOUS CODE/TRAUMA/SEDATION MEDICATION
Status: COMPLETED | OUTPATIENT
Start: 2020-08-06 | End: 2020-08-06

## 2020-08-06 RX ADMIN — Medication 10 ML: at 12:08

## 2020-08-06 RX ADMIN — INSULIN ASPART 2 UNITS: 100 INJECTION, SOLUTION INTRAVENOUS; SUBCUTANEOUS at 04:08

## 2020-08-06 RX ADMIN — CIPROFLOXACIN 400 MG: 2 INJECTION, SOLUTION INTRAVENOUS at 08:08

## 2020-08-06 RX ADMIN — INSULIN ASPART 4 UNITS: 100 INJECTION, SOLUTION INTRAVENOUS; SUBCUTANEOUS at 04:08

## 2020-08-06 RX ADMIN — INSULIN ASPART 7 UNITS: 100 INJECTION, SOLUTION INTRAVENOUS; SUBCUTANEOUS at 01:08

## 2020-08-06 RX ADMIN — INSULIN ASPART 2 UNITS: 100 INJECTION, SOLUTION INTRAVENOUS; SUBCUTANEOUS at 08:08

## 2020-08-06 RX ADMIN — METOPROLOL TARTRATE 5 MG: 5 INJECTION, SOLUTION INTRAVENOUS at 12:08

## 2020-08-06 RX ADMIN — MIDAZOLAM HYDROCHLORIDE 1 MG: 1 INJECTION, SOLUTION INTRAMUSCULAR; INTRAVENOUS at 05:08

## 2020-08-06 RX ADMIN — INSULIN ASPART 9 UNITS: 100 INJECTION, SOLUTION INTRAVENOUS; SUBCUTANEOUS at 12:08

## 2020-08-06 RX ADMIN — ACETAMINOPHEN 1000 MG: 10 INJECTION, SOLUTION INTRAVENOUS at 09:08

## 2020-08-06 RX ADMIN — HYDROMORPHONE HYDROCHLORIDE 0.5 MG: 1 INJECTION, SOLUTION INTRAMUSCULAR; INTRAVENOUS; SUBCUTANEOUS at 12:08

## 2020-08-06 RX ADMIN — FENTANYL CITRATE 50 MCG: 50 INJECTION, SOLUTION INTRAMUSCULAR; INTRAVENOUS at 05:08

## 2020-08-06 RX ADMIN — INSULIN ASPART 2 UNITS: 100 INJECTION, SOLUTION INTRAVENOUS; SUBCUTANEOUS at 01:08

## 2020-08-06 RX ADMIN — MAGNESIUM SULFATE HEPTAHYDRATE: 500 INJECTION, SOLUTION INTRAMUSCULAR; INTRAVENOUS at 09:08

## 2020-08-06 RX ADMIN — IOHEXOL 75 ML: 350 INJECTION, SOLUTION INTRAVENOUS at 10:08

## 2020-08-06 RX ADMIN — INSULIN ASPART 9 UNITS: 100 INJECTION, SOLUTION INTRAVENOUS; SUBCUTANEOUS at 04:08

## 2020-08-06 RX ADMIN — I.V. FAT EMULSION 250 ML: 20 EMULSION INTRAVENOUS at 09:08

## 2020-08-06 RX ADMIN — IBUPROFEN 800 MG: 800 INJECTION INTRAVENOUS at 11:08

## 2020-08-06 RX ADMIN — INSULIN ASPART 4 UNITS: 100 INJECTION, SOLUTION INTRAVENOUS; SUBCUTANEOUS at 12:08

## 2020-08-06 RX ADMIN — IOHEXOL 15 ML: 350 INJECTION, SOLUTION INTRAVENOUS at 09:08

## 2020-08-06 RX ADMIN — CIPROFLOXACIN 400 MG: 2 INJECTION, SOLUTION INTRAVENOUS at 09:08

## 2020-08-06 RX ADMIN — ONDANSETRON 4 MG: 2 INJECTION INTRAMUSCULAR; INTRAVENOUS at 05:08

## 2020-08-06 RX ADMIN — ENOXAPARIN SODIUM 40 MG: 40 INJECTION SUBCUTANEOUS at 04:08

## 2020-08-06 RX ADMIN — PROMETHAZINE HYDROCHLORIDE 6.25 MG: 25 INJECTION INTRAMUSCULAR; INTRAVENOUS at 05:08

## 2020-08-06 RX ADMIN — ACETAMINOPHEN 1000 MG: 10 INJECTION, SOLUTION INTRAVENOUS at 02:08

## 2020-08-06 RX ADMIN — HYDROMORPHONE HYDROCHLORIDE 1 MG: 1 INJECTION, SOLUTION INTRAMUSCULAR; INTRAVENOUS; SUBCUTANEOUS at 07:08

## 2020-08-06 RX ADMIN — INSULIN ASPART 7 UNITS: 100 INJECTION, SOLUTION INTRAVENOUS; SUBCUTANEOUS at 04:08

## 2020-08-06 RX ADMIN — INSULIN ASPART 11 UNITS: 100 INJECTION, SOLUTION INTRAVENOUS; SUBCUTANEOUS at 08:08

## 2020-08-06 RX ADMIN — PANTOPRAZOLE SODIUM 40 MG: 40 INJECTION, POWDER, LYOPHILIZED, FOR SOLUTION INTRAVENOUS at 09:08

## 2020-08-06 RX ADMIN — ONDANSETRON 8 MG: 2 INJECTION INTRAMUSCULAR; INTRAVENOUS at 04:08

## 2020-08-06 RX ADMIN — INSULIN ASPART 7 UNITS: 100 INJECTION, SOLUTION INTRAVENOUS; SUBCUTANEOUS at 08:08

## 2020-08-06 NOTE — PLAN OF CARE
Pt arrived to IR room 188 for abcess drainage and possible drain placement, allergies reviewed, preparing for procedure

## 2020-08-06 NOTE — PROGRESS NOTES
Procedure complete, successful drainage of abdominal abcess and placement of abcess drain per MD, 70 ml of clear straw colored fluid drained, specimen collected and sent to lab for cultures and cell count, pt tolerated procedure well and without event, report called to floor to nurseCe, recovery started in procedural area, awaiting ROCU assignment

## 2020-08-06 NOTE — SUBJECTIVE & OBJECTIVE
"Interval HPI:   Overnight events:  BG is slightly above goal on current SQ insulin regimen.   TPN ADULT CENTRAL LINE CUSTOM  Diet NPO Except for: Sips with Medication (can have sips of clear liquids), Ice Chips, Medication  TPN ADULT CENTRAL LINE CUSTOM  6 Days Post-Op    Eating:   NPO  Nausea: No  Hypoglycemia and intervention: No  Fever: yes 100.2  TPN and/or TF: No  If yes, type of TF/TPN and rate: none    BP (!) 173/83 (BP Location: Right arm, Patient Position: Lying)   Pulse 98   Temp 100.2 °F (37.9 °C) (Oral)   Resp 20   Ht 5' 1" (1.549 m)   Wt 70.7 kg (155 lb 13.8 oz)   LMP  (LMP Unknown)   SpO2 96%   Breastfeeding No   BMI 29.45 kg/m²     Labs Reviewed and Include    Recent Labs   Lab 08/06/20  0300   *   CALCIUM 8.5*   ALBUMIN 1.2*   PROT 5.5*      K 4.7   CO2 28      BUN 12   CREATININE 0.8   ALKPHOS 336*   ALT 34   AST 50*   BILITOT 0.6     Lab Results   Component Value Date    WBC 10.54 08/06/2020    HGB 6.9 (L) 08/06/2020    HCT 21.7 (L) 08/06/2020    MCV 79 (L) 08/06/2020     (H) 08/06/2020     No results for input(s): TSH, FREET4 in the last 168 hours.  Lab Results   Component Value Date    HGBA1C 6.7 (H) 07/09/2020       Nutritional status:   Body mass index is 29.45 kg/m².  Lab Results   Component Value Date    ALBUMIN 1.2 (L) 08/06/2020    ALBUMIN 1.2 (L) 08/05/2020    ALBUMIN 1.3 (L) 08/04/2020     Lab Results   Component Value Date    PREALBUMIN 21 07/15/2020       Estimated Creatinine Clearance: 59.7 mL/min (based on SCr of 0.8 mg/dL).    Accu-Checks  Recent Labs     08/04/20  2154 08/05/20  0041 08/05/20  0421 08/05/20  0758 08/05/20  1157 08/05/20  1641 08/05/20  2141 08/06/20  0119 08/06/20  0410 08/06/20  0819   POCTGLUCOSE 220* 236* 230* 189* 193* 207* 217* 185* 198* 197*       Current Medications and/or Treatments Impacting Glycemic Control  Immunotherapy:    Immunosuppressants     None        Steroids:   Hormones (From admission, onward)    None    "     Pressors:    Autonomic Drugs (From admission, onward)    None        Hyperglycemia/Diabetes Medications:   Antihyperglycemics (From admission, onward)    Start     Stop Route Frequency Ordered    08/06/20 1200  insulin aspart U-100 pen 7 Units      -- SubQ Every 24 hours (non-standard times) 08/05/20 1254    08/06/20 0800  insulin aspart U-100 pen 7 Units      -- SubQ Every 24 hours (non-standard times) 08/05/20 1254    08/06/20 0400  insulin aspart U-100 pen 7 Units      -- SubQ Every 24 hours (non-standard times) 08/05/20 1254    08/06/20 0000  insulin aspart U-100 pen 7 Units      -- SubQ Every 24 hours (non-standard times) 08/05/20 1254    08/05/20 2000  insulin aspart U-100 pen 7 Units      -- SubQ Every 24 hours (non-standard times) 08/05/20 1254    08/05/20 1600  insulin aspart U-100 pen 7 Units      -- SubQ Every 24 hours (non-standard times) 08/05/20 1254    08/04/20 1409  insulin aspart U-100 pen 1-10 Units      -- SubQ Every 4 hours PRN 08/04/20 1310

## 2020-08-06 NOTE — ASSESSMENT & PLAN NOTE
SSI  On scheduled insulin ordered by Endo team.  Accu check Q6    Endocrinology consulted. Appreciate their recommendations.

## 2020-08-06 NOTE — CONSULTS
Radiology History & Physical      SUBJECTIVE:     History of Present Illness:  Azucena Morrison is a 70 y.o. female with history of DM, HTN, and complicated surgical history including recent LAR. Recent CT with anterior abdominal fluid collection. IR consulted for percutaneous drainage.    Past Medical History:   Diagnosis Date    Chronic kidney disease, stage 3     Diabetes mellitus     Diabetes mellitus, type 2     Hypertension     UTI (urinary tract infection) 2020     Past Surgical History:   Procedure Laterality Date    CATHETERIZATION OF URETER Bilateral 2020    Procedure: CATHETERIZATION, URETER;  Surgeon: Kvng Cunningham MD;  [facility];  Service: Urology;  Laterality: Bilateral;     SECTION, CLASSIC      x2    CHOLECYSTECTOMY      COLON SURGERY      [facility]    COLONOSCOPY N/A 2018    Procedure: COLONOSCOPY;  Surgeon: Gibran Aguayo MD;  Location: [facility];  Service: Endoscopy;  Laterality: N/A;    COLOSTOMY Left     CYSTOSCOPY N/A 2020    Procedure: CYSTOSCOPY;  Surgeon: Kvng Cunningham MD;  Location: [facility] OR [address] [place] [address];  Service: Urology;  Laterality: N/A;    CYSTOSCOPY WITH URETEROSCOPY, RETROGRADE PYELOGRAPHY, AND INSERTION OF STENT Left 2019    Procedure: CYSTOSCOPY, WITH RETROGRADE PYELOGRAM AND URETERAL STENT INSERTION with placement of a muir cath;  Surgeon: Ulisses Horton MD;  Location: [facility] OR;  Service: General;  Laterality: Left;    ILEOSTOMY  2020    Procedure: CREATION, ILEOSTOMY;  Surgeon: Fabiana Valiente MD;  [facility];  Service: Colon and Rectal;;    INCISION AND DRAINAGE OF ABSCESS N/A 2019    Procedure: INCISION AND DRAINAGE, ABSCESS;  Surgeon: Ulisses Horton MD;  Location: [facility] OR;  Service: General;  Laterality: N/A;    INCISION OF ABDOMINAL WALL N/A 8/10/2018    Procedure: INCISION, ABDOMINAL WALL;  Surgeon: Ulisses Horton MD;  Location: [facility] OR;  Service:  General;  Laterality: N/A;    INCISIONAL HERNIA REPAIR Right 2010    LOW ANTERIOR RESECTION OF COLON N/A 7/31/2020    Procedure: RESECTION, COLON, LOW ANTERIOR;  Surgeon: Fabiana Valiente MD;  Location: [facility] OR 2ND FLR;  Service: Colon and Rectal;  Laterality: N/A;    LYSIS OF ADHESIONS  7/31/2020    Procedure: LYSIS, ADHESIONS;  Surgeon: Fabiana Valiente MD;  Location: [facility] OR 2ND FLR;  Service: Colon and Rectal;;       Home Meds:   Prior to Admission medications    Medication Sig Start Date End Date Taking? Authorizing Provider   carvedilol (COREG) 25 MG tablet Take 1 tablet (25 mg total) by mouth once daily. 1/8/19  Yes    acetaminophen (TYLENOL) 325 MG tablet Take 2 tablets (650 mg total) by mouth every 8 (eight) hours as needed. 11/28/16   Ulisses Horton MD   betamethasone valerate 0.1% (VALISONE) 0.1 % Crea Apply around wound as needed daily for itching. 4/10/19   Ulisses Horton MD   blood sugar diagnostic Strp Use to test blood sugar twice daily as directed. 4/22/20   Ulisses Horton MD   blood-glucose meter Misc Use as directed to test blood sugar twice daily 4/22/20   Ulisses Horton MD   clotrimazole-betamethasone 1-0.05% (LOTRISONE) cream Apply topically locally as directed 5/13/20   Ulisses Horton MD   dicyclomine (BENTYL) 10 MG capsule TAKE 1 CAPSULE BY MOUTH THREE TIMES A DAY 6/24/20      gentamicin (GARAMYCIN) 0.1 % ointment Apply topically to affected area daily 5/28/20   Ulisses Horton MD   hydroCHLOROthiazide (HYDRODIURIL) 25 MG tablet Take 1 tablet (25 mg total) by mouth once daily. 8/30/19   Pj Sanches MD   HYDROcodone-acetaminophen (NORCO) 5-325 mg per tablet Take 1 tablet by mouth every 4 (four) hours as needed. 7/3/20   Ulisses Horton MD   insulin glargine 100 units/mL (3mL) SubQ pen Inject subcutaneously into the skin 10 units every morning. 1/9/19      lancets 30 gauge Misc Use to test blood sugar twice daily. 4/22/20   Ulisses Horton,  "MD   metFORMIN (GLUCOPHAGE) 1000 MG tablet Take 1 tablet (1,000 mg total) by mouth 2 (two) times daily. 1/9/19      metoclopramide HCl (REGLAN) 10 MG tablet Take 1 tablet (10 mg total) by mouth 4 (four) times daily. 7/8/20   Ulisses Horton MD   naproxen (NAPROSYN) 500 MG tablet Take 1 tablet (500 mg total) by mouth 2 (two) times daily with meals. 9/4/19   Ulisses Horton MD   ondansetron (ZOFRAN) 8 MG tablet Take 1 tablet (8 mg total) by mouth 2 (two) times daily as needed.  Patient taking differently: Take 8 mg by mouth every 8 (eight) hours as needed for Nausea.  4/22/20   Ulisses Horton MD   pen needle, diabetic (BD ULTRA-FINE ONEIL PEN NEEDLE) 32 gauge x 5/32" Ndle Use to inject insulin daily 7/24/19   Ulisses Horton MD   TRUETEST TEST STRIPS Strp  4/7/16   Historical Provider, MD     Anticoagulants/Antiplatelets: no anticoagulation    Allergies:   Review of patient's allergies indicates:   Allergen Reactions    Chlorhexidine gluconate Rash     Chlorhexidine/alcohol wipes. Rash and blistering.     Sedation History:  no adverse reactions    Review of Systems:   Hematological: no known coagulopathies  Respiratory: no shortness of breath  Cardiovascular: no chest pain  Gastrointestinal: no abdominal pain  Genito-Urinary: no dysuria  Musculoskeletal: negative  Neurological: no TIA or stroke symptoms         OBJECTIVE:     Vital Signs (Most Recent)  Temp: 100.2 °F (37.9 °C) (08/06/20 1114)  Pulse: 93 (08/06/20 1218)  Resp: 17 (08/06/20 1226)  BP: (!) 155/76 (08/06/20 1218)  SpO2: 96 % (08/06/20 1114)    Physical Exam:  ASA: 2  Mallampati: 2    General: no acute distress  Mental Status: alert and oriented to person, place and time  HEENT: normocephalic, atraumatic  Chest: unlabored breathing  Heart: regular heart rate  Abdomen: nondistended  Extremity: moves all extremities    Laboratory  Lab Results   Component Value Date    INR 1.0 07/14/2020       Lab Results   Component Value Date    WBC " 10.54 08/06/2020    HGB 6.9 (L) 08/06/2020    HCT 21.7 (L) 08/06/2020    MCV 79 (L) 08/06/2020     (H) 08/06/2020      Lab Results   Component Value Date     (H) 08/06/2020     08/06/2020    K 4.7 08/06/2020     08/06/2020    CO2 28 08/06/2020    BUN 12 08/06/2020    CREATININE 0.8 08/06/2020    CALCIUM 8.5 (L) 08/06/2020    MG 1.9 08/06/2020    ALT 34 08/06/2020    AST 50 (H) 08/06/2020    ALBUMIN 1.2 (L) 08/06/2020    BILITOT 0.6 08/06/2020       ASSESSMENT/PLAN:     Sedation Plan: Moderate.  Patient will undergo peritoneal fluid collection drainage.    Gordo May MD  PGY-IV Radiology Resident  Pager: [phone]

## 2020-08-06 NOTE — PROCEDURES
"Radiology Post-Procedure Note    Pre Op Diagnosis: Peritoneal fluid collection  Post Op Diagnosis: Same    Procedure: Drain placement    Procedure performed by: Ranjit Medina MD    Written Informed Consent Obtained: Yes  Specimen Removed: YES 80cc  Estimated Blood Loss: Minimal    Findings:   Successful placement of drain in peritoneal fluid collection.     Patient tolerated procedure well.    Ranjit Medina MD ([place])  Interventional Radiology  [phone]        "

## 2020-08-06 NOTE — NURSING
Patient to IR for drain placement via stretcher. TPN infusing at 75 via picc line to left upper arm. Patient awake and alert.

## 2020-08-06 NOTE — ASSESSMENT & PLAN NOTE
BG goal 140-180    - Increase Novolog to 9 units q 4 hrs while on TPN. HOLD if TPN is stopped for any reason.   - Continue Moderate Dose Correction Scale  - BG monitoring q 4 hrs while on TPN (coordinate with meals during the day)     ADDENDUM 4:47 PM   - BG continues to rise upward above goal.    - Increase Novolog to 11 units every 4 hours. Patient continues to require correction scale insulin . Please hold if TPN stopped for any reason.    - BG monitoring every 4 hours while on TPN    ** Please call Endocrine for any BG related issues **  ** Please call Endocrine if TPN is restarted or diet changes **    Discharge plans:  TBD. Please notify endocrinology prior to discharge.

## 2020-08-06 NOTE — SUBJECTIVE & OBJECTIVE
Subjective:     Interval History:   Afebrile. Nauseated and multiple episodes of emesis this morning. NG tube placed after rounds with 600 cc brown output on placement. Endorses pain at lower skin incision. Scant ostomy output over past 24h.      Post-Op Info:  Procedure(s) (LRB):  RESECTION, COLON, LOW ANTERIOR (N/A)  CYSTOSCOPY (N/A)  CATHETERIZATION, URETER (Bilateral)  CREATION, ILEOSTOMY  LYSIS, ADHESIONS   6 Days Post-Op      Medications:  Continuous Infusions:   dextrose 10 % in water (D10W)      TPN ADULT CENTRAL LINE CUSTOM 75 mL/hr at 08/05/20 2130    TPN ADULT CENTRAL LINE CUSTOM       Scheduled Meds:   acetaminophen  1,000 mg Intravenous Q8H    ciprofloxacin  400 mg Intravenous Q12H    enoxaparin  40 mg Subcutaneous Q24H    fat emulsion 20%  250 mL Intravenous Daily    ibuprofen  800 mg Intravenous Q6H    insulin aspart U-100  7 Units Subcutaneous Q24H    insulin aspart U-100  7 Units Subcutaneous Q24H    insulin aspart U-100  7 Units Subcutaneous Q24H    insulin aspart U-100  7 Units Subcutaneous Q24H    insulin aspart U-100  7 Units Subcutaneous Q24H    insulin aspart U-100  7 Units Subcutaneous Q24H    lidocaine  1 patch Transdermal Q24H    metoprolol  5 mg Intravenous Q6H    pantoprozole (PROTONIX) IV  40 mg Intravenous Q24H    sodium chloride 0.9%  10 mL Intravenous Q6H     PRN Meds:   calcium carbonate    dextrose 10 % in water (D10W)    dextrose 50%    glucagon (human recombinant)    HYDROmorphone    HYDROmorphone    insulin aspart U-100    iohexol    ondansetron    ondansetron    promethazine (PHENERGAN) IVPB    sodium chloride 0.9%    sodium chloride 0.9%    traMADoL        Objective:     Vital Signs (Most Recent):  Temp: 98.7 °F (37.1 °C) (08/06/20 0756)  Pulse: 99 (08/06/20 0756)  Resp: 18 (08/06/20 0756)  BP: (!) 169/85 (08/06/20 0756)  SpO2: 99 % (08/06/20 0756) Vital Signs (24h Range):  Temp:  [97.8 °F (36.6 °C)-98.7 °F (37.1 °C)] 98.7 °F (37.1 °C)  Pulse:   [] 99  Resp:  [16-19] 18  SpO2:  [96 %-99 %] 99 %  BP: (134-182)/(72-89) 169/85     Intake/Output - Last 3 Shifts       08/04 0700 - 08/05 0659 08/05 0700 - 08/06 0659 08/06 0700 - 08/07 0659    P.O. 60 800     I.V. (mL/kg) 1000 (14.1) 50 (0.7)     Blood       IV Piggyback 100 350     .3 1343.8     Total Intake(mL/kg) 1711.3 (24.2) 2543.8 (36)     Urine (mL/kg/hr) 1050 (0.6) 2650 (1.6) 300 (2.3)    Emesis/NG output  200 100    Drains   250    Other 0      Stool 50 0     Blood       Total Output 1100 2850 650    Net +611.3 -306.3 -650           Urine Occurrence 3 x 1 x     Stool Occurrence 1 x 0 x     Emesis Occurrence  1 x           Physical Exam  Constitutional:       General: She is not in acute distress.  HENT:      Head: Normocephalic.   Eyes:      Conjunctiva/sclera: Conjunctivae normal.   Neck:      Trachea: No tracheal deviation.   NG tube in place with 600 cc brown output on placement    Cardiovascular:      Rate and Rhythm: Regular rhythm.   Pulmonary:      [place]: Pulmonary effort is normal. No respiratory distress.   Abdominal:      Comments:   Skin separation at inferior aspect of wound with serosang + purulence v fat necrosis able to be expressed through wound. Ostomy pink, small amt liquid brown stool and no gas in bag. Tympany.    Neurological:      Mental Status: She is alert and oriented to person, place, and time.         Significant Labs:  BMP (Last 3 Results):   Recent Labs   Lab 08/04/20  0400 08/05/20  0300 08/05/20  0430 08/06/20  0300   *  --  225* 183*     --  135* 136   K 3.2*  --  3.6 4.7     --  105 103   CO2 24  --  25 28   BUN 18  --  13 12   CREATININE 0.8  --  0.8 0.8   CALCIUM 7.6*  --  7.8* 8.5*   MG 1.7 1.7  --  1.9     CBC (Last 3 Results):   Recent Labs   Lab 08/04/20  2254 08/05/20  0430 08/06/20  0300   WBC 7.58 6.84 10.54   RBC 2.77* 2.59* 2.74*   HGB 7.0* 6.5* 6.9*   HCT 21.7* 20.3* 21.7*    309 355*   MCV 78* 78* 79*   MCH 25.3* 25.1*  25.2*   MCHC 32.3 32.0 31.8*

## 2020-08-06 NOTE — ASSESSMENT & PLAN NOTE
70 y.o. female 6 Days Post-Op for Procedure(s) (LRB):  RESECTION, COLON, LOW ANTERIOR (N/A)  CYSTOSCOPY (N/A)  CATHETERIZATION, URETER (Bilateral)  CREATION, ILEOSTOMY  LYSIS, ADHESIONS    WBC WNL but up to 10.5k from 6.   NPO and NG tube.  Continue TPN, will add lipids today since TGs under 300.  IV pain meds  IS  OOB  PT/OT    Continue communication with use of

## 2020-08-06 NOTE — PROGRESS NOTES
Follow up for ileostomy.    Received pt lying in bed with NG tube in use and draining to bedside suction. Pt is awake, but appears lethargic with a sense of malaise. Ostomy pouch intact with minimal output noted. Left supplies (covexity ostomy pouch) and educational information at bedside. Not receptive to teaching today due to not feeling well.    Pt's nurse reports that pt will most likely remain in the hospital for several days. Chart reflects per Colon and Rectal surgery that there are plans to obtain a CT abd/pelvis with PO and IV contrast. Will plan to follow up with pt for pouch change and additional teaching. [id number]

## 2020-08-06 NOTE — ASSESSMENT & PLAN NOTE
Monitor BP  Currently holding home carvedilol and HCTZ - had restarted it but now is NPO so added scheduled metoprolol 5 mg q6h injections.

## 2020-08-06 NOTE — NURSING
Temp reported of 100.2 and blood pressure of 173/83. Patient denies headache or dizziness.  Reported to Eli Paris NP. Will monitor.

## 2020-08-06 NOTE — PROGRESS NOTES
[facility]  Colorectal Surgery  Progress Note    Patient Name: Azucena Morrison  MRN: [id number]  Admission Date: 7/15/2020  Hospital Length of Stay: 22 days  Attending Physician: Fabiana Valiente MD    Subjective:     Interval History:   Afebrile. Nauseated and multiple episodes of emesis this morning. NG tube placed after rounds with 600 cc brown output on placement. Endorses pain at lower skin incision. Scant ostomy output over past 24h.      Post-Op Info:  Procedure(s) (LRB):  RESECTION, COLON, LOW ANTERIOR (N/A)  CYSTOSCOPY (N/A)  CATHETERIZATION, URETER (Bilateral)  CREATION, ILEOSTOMY  LYSIS, ADHESIONS   6 Days Post-Op      Medications:  Continuous Infusions:   dextrose 10 % in water (D10W)      TPN ADULT CENTRAL LINE CUSTOM 75 mL/hr at 08/05/20 2130    TPN ADULT CENTRAL LINE CUSTOM       Scheduled Meds:   acetaminophen  1,000 mg Intravenous Q8H    ciprofloxacin  400 mg Intravenous Q12H    enoxaparin  40 mg Subcutaneous Q24H    fat emulsion 20%  250 mL Intravenous Daily    ibuprofen  800 mg Intravenous Q6H    insulin aspart U-100  7 Units Subcutaneous Q24H    insulin aspart U-100  7 Units Subcutaneous Q24H    insulin aspart U-100  7 Units Subcutaneous Q24H    insulin aspart U-100  7 Units Subcutaneous Q24H    insulin aspart U-100  7 Units Subcutaneous Q24H    insulin aspart U-100  7 Units Subcutaneous Q24H    lidocaine  1 patch Transdermal Q24H    metoprolol  5 mg Intravenous Q6H    pantoprozole (PROTONIX) IV  40 mg Intravenous Q24H    sodium chloride 0.9%  10 mL Intravenous Q6H     PRN Meds:   calcium carbonate    dextrose 10 % in water (D10W)    dextrose 50%    glucagon (human recombinant)    HYDROmorphone    HYDROmorphone    insulin aspart U-100    iohexol    ondansetron    ondansetron    promethazine (PHENERGAN) IVPB    sodium chloride 0.9%    sodium chloride 0.9%    traMADoL        Objective:     Vital Signs (Most Recent):  Temp: 98.7 °F (37.1 °C)  (08/06/20 0756)  Pulse: 99 (08/06/20 0756)  Resp: 18 (08/06/20 0756)  BP: (!) 169/85 (08/06/20 0756)  SpO2: 99 % (08/06/20 0756) Vital Signs (24h Range):  Temp:  [97.8 °F (36.6 °C)-98.7 °F (37.1 °C)] 98.7 °F (37.1 °C)  Pulse:  [] 99  Resp:  [16-19] 18  SpO2:  [96 %-99 %] 99 %  BP: (134-182)/(72-89) 169/85     Intake/Output - Last 3 Shifts       08/04 0700 - 08/05 0659 08/05 0700 - 08/06 0659 08/06 0700 - 08/07 0659    P.O. 60 800     I.V. (mL/kg) 1000 (14.1) 50 (0.7)     Blood       IV Piggyback 100 350     .3 1343.8     Total Intake(mL/kg) 1711.3 (24.2) 2543.8 (36)     Urine (mL/kg/hr) 1050 (0.6) 2650 (1.6) 300 (2.3)    Emesis/NG output  200 100    Drains   250    Other 0      Stool 50 0     Blood       Total Output 1100 2850 650    Net +611.3 -306.3 -650           Urine Occurrence 3 x 1 x     Stool Occurrence 1 x 0 x     Emesis Occurrence  1 x           Physical Exam  Constitutional:       General: She is not in acute distress.  HENT:      Head: Normocephalic.   Eyes:      Conjunctiva/sclera: Conjunctivae normal.   Neck:      Trachea: No tracheal deviation.   NG tube in place with 600 cc brown output on placement    Cardiovascular:      Rate and Rhythm: Regular rhythm.   Pulmonary:      [place]: Pulmonary effort is normal. No respiratory distress.   Abdominal:      Comments:   Skin separation at inferior aspect of wound with serosang + purulence v fat necrosis able to be expressed through wound. Ostomy pink, small amt liquid brown stool and no gas in bag. Tympany.    Neurological:      Mental Status: She is alert and oriented to person, place, and time.         Significant Labs:  BMP (Last 3 Results):   Recent Labs   Lab 08/04/20  0400 08/05/20  0300 08/05/20  0430 08/06/20  0300   *  --  225* 183*     --  135* 136   K 3.2*  --  3.6 4.7     --  105 103   CO2 24  --  25 28   BUN 18  --  13 12   CREATININE 0.8  --  0.8 0.8   CALCIUM 7.6*  --  7.8* 8.5*   MG 1.7 1.7  --  1.9     CBC  (Last 3 Results):   Recent Labs   Lab 08/04/20  2254 08/05/20  0430 08/06/20  0300   WBC 7.58 6.84 10.54   RBC 2.77* 2.59* 2.74*   HGB 7.0* 6.5* 6.9*   HCT 21.7* 20.3* 21.7*    309 355*   MCV 78* 78* 79*   MCH 25.3* 25.1* 25.2*   MCHC 32.3 32.0 31.8*         Assessment/Plan:     * Cutaneous fistula  70 y.o. female 6 Days Post-Op for Procedure(s) (LRB):  RESECTION, COLON, LOW ANTERIOR (N/A)  CYSTOSCOPY (N/A)  CATHETERIZATION, URETER (Bilateral)  CREATION, ILEOSTOMY  LYSIS, ADHESIONS    WBC WNL but up to 10.5k from 6.   NPO and NG tube.  Continue TPN, will add lipids today since TGs under 300.  IV pain meds  IS  OOB  PT/OT  Will obtain CT abd/pelv with PO and IV contrast.    Continue communication with use of       UTI (urinary tract infection)  Treat with Cipro x 5 days (first day 8/5/20)    Acute blood loss anemia  Hg 6.9 this morning. Hold on transfusion.      Chronic kidney disease, stage 3  Monitor labs  Monitor I&O    Creatinine 0.8. adequate UOP.    Generalized abdominal pain  Continue IV pain meds      Essential hypertension  Monitor BP  Currently holding home carvedilol and HCTZ - had restarted it but now is NPO so added scheduled metoprolol 5 mg q6h injections.    Type 2 diabetes mellitus without complication, with long-term current use of insulin  SSI  On scheduled insulin ordered by Endo team.  Accu check Q6    Endocrinology consulted. Appreciate their recommendations.           Pat Turner MD  Colorectal Surgery  [facility]

## 2020-08-06 NOTE — PROGRESS NOTES
Pt. Vomit 200 ml. MD on call made aware. IV zofran given and patient feels mostly better. No new orders at this time. WCTM.

## 2020-08-06 NOTE — PROGRESS NOTES
"[facility]  Endocrinology  Progress Note    Admit Date: 7/15/2020     Reason for Consult: Management of T2DM, Hyperglycemia     Surgical Procedure and Date: n/a    Lab Results   Component Value Date    HGBA1C 6.7 (H) 07/09/2020     Diabetes diagnosis year: 2015    Home Diabetes Medications:  Lantus 10 units daily if am BG >180 and Metformin 1g BID    How often checking glucose at home? once daily   BG readings on regimen: 150-low 200s  Hypoglycemia on the regimen?  No  Missed doses on regimen?  No    Diabetes Complications include:     Hyperglycemia    Complicating diabetes co morbidities:   CKD      HPI:   Patient is a 70 y.o. female with a diagnosis of T2DM, HTN, and complicated surgical history, including h/o colon resection and Aiyana procedure, takedown colostomy, colostomy closure, clot of colo cutaneous fistula. She presnts with abdominal pain and vomiting. 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 CT scan from OSH showed partially obstructed small bowel. She was admitted to [facility]. Endocrinology consulted for management of T2DM. Of note, patient's primary language is Khmer.  An  was used for this consult.       Interval HPI:   Overnight events:  BG is slightly above goal on current SQ insulin regimen.   TPN ADULT CENTRAL LINE CUSTOM  Diet NPO Except for: Sips with Medication (can have sips of clear liquids), Ice Chips, Medication  TPN ADULT CENTRAL LINE CUSTOM  6 Days Post-Op    Eating:   NPO  Nausea: No  Hypoglycemia and intervention: No  Fever: yes 100.2  TPN and/or TF: No  If yes, type of TF/TPN and rate: none    BP (!) 173/83 (BP Location: Right arm, Patient Position: Lying)   Pulse 98   Temp 100.2 °F (37.9 °C) (Oral)   Resp 20   Ht 5' 1" (1.549 m)   Wt 70.7 kg (155 lb 13.8 oz)   LMP  (LMP Unknown)   SpO2 96%   Breastfeeding No   BMI 29.45 kg/m²     Labs Reviewed and Include    Recent Labs   Lab 08/06/20  0300 "   *   CALCIUM 8.5*   ALBUMIN 1.2*   PROT 5.5*      K 4.7   CO2 28      BUN 12   CREATININE 0.8   ALKPHOS 336*   ALT 34   AST 50*   BILITOT 0.6     Lab Results   Component Value Date    WBC 10.54 08/06/2020    HGB 6.9 (L) 08/06/2020    HCT 21.7 (L) 08/06/2020    MCV 79 (L) 08/06/2020     (H) 08/06/2020     No results for input(s): TSH, FREET4 in the last 168 hours.  Lab Results   Component Value Date    HGBA1C 6.7 (H) 07/09/2020       Nutritional status:   Body mass index is 29.45 kg/m².  Lab Results   Component Value Date    ALBUMIN 1.2 (L) 08/06/2020    ALBUMIN 1.2 (L) 08/05/2020    ALBUMIN 1.3 (L) 08/04/2020     Lab Results   Component Value Date    PREALBUMIN 21 07/15/2020       Estimated Creatinine Clearance: 59.7 mL/min (based on SCr of 0.8 mg/dL).    Accu-Checks  Recent Labs     08/04/20  2154 08/05/20  0041 08/05/20  0421 08/05/20  0758 08/05/20  1157 08/05/20  1641 08/05/20  2141 08/06/20  0119 08/06/20  0410 08/06/20  0819   POCTGLUCOSE 220* 236* 230* 189* 193* 207* 217* 185* 198* 197*       Current Medications and/or Treatments Impacting Glycemic Control  Immunotherapy:    Immunosuppressants     None        Steroids:   Hormones (From admission, onward)    None        Pressors:    Autonomic Drugs (From admission, onward)    None        Hyperglycemia/Diabetes Medications:   Antihyperglycemics (From admission, onward)    Start     Stop Route Frequency Ordered    08/06/20 1200  insulin aspart U-100 pen 7 Units      -- SubQ Every 24 hours (non-standard times) 08/05/20 1254    08/06/20 0800  insulin aspart U-100 pen 7 Units      -- SubQ Every 24 hours (non-standard times) 08/05/20 1254    08/06/20 0400  insulin aspart U-100 pen 7 Units      -- SubQ Every 24 hours (non-standard times) 08/05/20 1254    08/06/20 0000  insulin aspart U-100 pen 7 Units      -- SubQ Every 24 hours (non-standard times) 08/05/20 1254 08/05/20 2000  insulin aspart U-100 pen 7 Units      -- SubQ Every 24 hours  (non-standard times) 08/05/20 1254    08/05/20 1600  insulin aspart U-100 pen 7 Units      -- SubQ Every 24 hours (non-standard times) 08/05/20 1254    08/04/20 1409  insulin aspart U-100 pen 1-10 Units      -- SubQ Every 4 hours PRN 08/04/20 1310          ASSESSMENT and PLAN    * Cutaneous fistula  Managed per primary team  Optimize BG control to improve wound healing        Type 2 diabetes mellitus without complication, with long-term current use of insulin  BG goal 140-180    - Increase Novolog to 9 units q 4 hrs while on TPN. HOLD if TPN is stopped for any reason.   - Continue Moderate Dose Correction Scale  - BG monitoring q 4 hrs while on TPN (coordinate with meals during the day)     ADDENDUM 4:47 PM   - BG continues to rise upward above goal.    - Increase Novolog to 11 units every 4 hours. Patient continues to require correction scale insulin . Please hold if TPN stopped for any reason.    - BG monitoring every 4 hours while on TPN    ** Please call Endocrine for any BG related issues **  ** Please call Endocrine if TPN is restarted or diet changes **    Discharge plans:  TBD. Please notify endocrinology prior to discharge.       Essential hypertension  Managed per primary team  Condition may cause insulin resistance         On total parenteral nutrition (TPN)  May elevate BG readings  May increase insulin requirements            Medardo Godoy NP  Endocrinology  [facility]

## 2020-08-07 ENCOUNTER — TELEPHONE (OUTPATIENT)
Dept: SURGERY | Facility: CLINIC | Age: 70
End: 2020-08-07

## 2020-08-07 LAB
ABO + RH BLD: NORMAL
ALBUMIN SERPL BCP-MCNC: 1.2 G/DL (ref 3.5–5.2)
ALP SERPL-CCNC: 285 U/L (ref 55–135)
ALT SERPL W/O P-5'-P-CCNC: 25 U/L (ref 10–44)
ANION GAP SERPL CALC-SCNC: 6 MMOL/L (ref 8–16)
ANISOCYTOSIS BLD QL SMEAR: SLIGHT
AST SERPL-CCNC: 18 U/L (ref 10–40)
BACTERIA UR CULT: ABNORMAL
BASOPHILS # BLD AUTO: 0.02 K/UL (ref 0–0.2)
BASOPHILS NFR BLD: 0.2 % (ref 0–1.9)
BILIRUB SERPL-MCNC: 0.3 MG/DL (ref 0.1–1)
BLD GP AB SCN CELLS X3 SERPL QL: NORMAL
BLD PROD TYP BPU: NORMAL
BLOOD UNIT EXPIRATION DATE: NORMAL
BLOOD UNIT TYPE CODE: 6200
BLOOD UNIT TYPE: NORMAL
BUN SERPL-MCNC: 10 MG/DL (ref 8–23)
CALCIUM SERPL-MCNC: 8.3 MG/DL (ref 8.7–10.5)
CHLORIDE SERPL-SCNC: 100 MMOL/L (ref 95–110)
CO2 SERPL-SCNC: 28 MMOL/L (ref 23–29)
CODING SYSTEM: NORMAL
CREAT SERPL-MCNC: 0.8 MG/DL (ref 0.5–1.4)
CRP SERPL-MCNC: 262 MG/L (ref 0–8.2)
DIFFERENTIAL METHOD: ABNORMAL
DISPENSE STATUS: NORMAL
EOSINOPHIL # BLD AUTO: 0.2 K/UL (ref 0–0.5)
EOSINOPHIL NFR BLD: 1.9 % (ref 0–8)
ERYTHROCYTE [DISTWIDTH] IN BLOOD BY AUTOMATED COUNT: 19.1 % (ref 11.5–14.5)
EST. GFR  (AFRICAN AMERICAN): >60 ML/MIN/1.73 M^2
EST. GFR  (NON AFRICAN AMERICAN): >60 ML/MIN/1.73 M^2
GLUCOSE SERPL-MCNC: 212 MG/DL (ref 70–110)
HCT VFR BLD AUTO: 19.5 % (ref 37–48.5)
HGB BLD-MCNC: 6.1 G/DL (ref 12–16)
HYPOCHROMIA BLD QL SMEAR: ABNORMAL
IMM GRANULOCYTES # BLD AUTO: 0.4 K/UL (ref 0–0.04)
IMM GRANULOCYTES NFR BLD AUTO: 4.2 % (ref 0–0.5)
LYMPHOCYTES # BLD AUTO: 1.2 K/UL (ref 1–4.8)
LYMPHOCYTES NFR BLD: 12.3 % (ref 18–48)
MAGNESIUM SERPL-MCNC: 1.7 MG/DL (ref 1.6–2.6)
MCH RBC QN AUTO: 24.8 PG (ref 27–31)
MCHC RBC AUTO-ENTMCNC: 31.3 G/DL (ref 32–36)
MCV RBC AUTO: 79 FL (ref 82–98)
MONOCYTES # BLD AUTO: 0.8 K/UL (ref 0.3–1)
MONOCYTES NFR BLD: 8.1 % (ref 4–15)
NEUTROPHILS # BLD AUTO: 7 K/UL (ref 1.8–7.7)
NEUTROPHILS NFR BLD: 73.3 % (ref 38–73)
NRBC BLD-RTO: 0 /100 WBC
OVALOCYTES BLD QL SMEAR: ABNORMAL
PHOSPHATE SERPL-MCNC: 3.8 MG/DL (ref 2.7–4.5)
PLATELET # BLD AUTO: 361 K/UL (ref 150–350)
PMV BLD AUTO: 8.6 FL (ref 9.2–12.9)
POCT GLUCOSE: 169 MG/DL (ref 70–110)
POCT GLUCOSE: 218 MG/DL (ref 70–110)
POCT GLUCOSE: 228 MG/DL (ref 70–110)
POCT GLUCOSE: 231 MG/DL (ref 70–110)
POCT GLUCOSE: 288 MG/DL (ref 70–110)
POIKILOCYTOSIS BLD QL SMEAR: SLIGHT
POLYCHROMASIA BLD QL SMEAR: ABNORMAL
POTASSIUM SERPL-SCNC: 4.1 MMOL/L (ref 3.5–5.1)
PROT SERPL-MCNC: 5.5 G/DL (ref 6–8.4)
RBC # BLD AUTO: 2.46 M/UL (ref 4–5.4)
SODIUM SERPL-SCNC: 134 MMOL/L (ref 136–145)
TRANS ERYTHROCYTES VOL PATIENT: NORMAL ML
WBC # BLD AUTO: 9.54 K/UL (ref 3.9–12.7)

## 2020-08-07 PROCEDURE — 63600175 PHARM REV CODE 636 W HCPCS: Performed by: COLON & RECTAL SURGERY

## 2020-08-07 PROCEDURE — 25000003 PHARM REV CODE 250: Performed by: COLON & RECTAL SURGERY

## 2020-08-07 PROCEDURE — P9021 RED BLOOD CELLS UNIT: HCPCS

## 2020-08-07 PROCEDURE — 99232 PR SUBSEQUENT HOSPITAL CARE,LEVL II: ICD-10-PCS | Mod: ,,, | Performed by: NURSE PRACTITIONER

## 2020-08-07 PROCEDURE — 86920 COMPATIBILITY TEST SPIN: CPT

## 2020-08-07 PROCEDURE — 80053 COMPREHEN METABOLIC PANEL: CPT

## 2020-08-07 PROCEDURE — C9113 INJ PANTOPRAZOLE SODIUM, VIA: HCPCS | Performed by: STUDENT IN AN ORGANIZED HEALTH CARE EDUCATION/TRAINING PROGRAM

## 2020-08-07 PROCEDURE — A4216 STERILE WATER/SALINE, 10 ML: HCPCS | Performed by: STUDENT IN AN ORGANIZED HEALTH CARE EDUCATION/TRAINING PROGRAM

## 2020-08-07 PROCEDURE — 99232 SBSQ HOSP IP/OBS MODERATE 35: CPT | Mod: ,,, | Performed by: NURSE PRACTITIONER

## 2020-08-07 PROCEDURE — A4217 STERILE WATER/SALINE, 500 ML: HCPCS | Performed by: STUDENT IN AN ORGANIZED HEALTH CARE EDUCATION/TRAINING PROGRAM

## 2020-08-07 PROCEDURE — 63600175 PHARM REV CODE 636 W HCPCS: Performed by: STUDENT IN AN ORGANIZED HEALTH CARE EDUCATION/TRAINING PROGRAM

## 2020-08-07 PROCEDURE — 63600175 PHARM REV CODE 636 W HCPCS: Performed by: NURSE PRACTITIONER

## 2020-08-07 PROCEDURE — 85025 COMPLETE CBC W/AUTO DIFF WBC: CPT

## 2020-08-07 PROCEDURE — 94799 UNLISTED PULMONARY SVC/PX: CPT

## 2020-08-07 PROCEDURE — 86901 BLOOD TYPING SEROLOGIC RH(D): CPT

## 2020-08-07 PROCEDURE — 83735 ASSAY OF MAGNESIUM: CPT

## 2020-08-07 PROCEDURE — 86140 C-REACTIVE PROTEIN: CPT

## 2020-08-07 PROCEDURE — 25000003 PHARM REV CODE 250: Performed by: STUDENT IN AN ORGANIZED HEALTH CARE EDUCATION/TRAINING PROGRAM

## 2020-08-07 PROCEDURE — 84100 ASSAY OF PHOSPHORUS: CPT

## 2020-08-07 PROCEDURE — 20600001 HC STEP DOWN PRIVATE ROOM

## 2020-08-07 PROCEDURE — 94761 N-INVAS EAR/PLS OXIMETRY MLT: CPT

## 2020-08-07 PROCEDURE — 36430 TRANSFUSION BLD/BLD COMPNT: CPT

## 2020-08-07 PROCEDURE — B4185 PARENTERAL SOL 10 GM LIPIDS: HCPCS | Performed by: STUDENT IN AN ORGANIZED HEALTH CARE EDUCATION/TRAINING PROGRAM

## 2020-08-07 RX ORDER — HYDROCODONE BITARTRATE AND ACETAMINOPHEN 500; 5 MG/1; MG/1
TABLET ORAL
Status: DISCONTINUED | OUTPATIENT
Start: 2020-08-07 | End: 2020-08-21 | Stop reason: HOSPADM

## 2020-08-07 RX ORDER — INSULIN ASPART 100 [IU]/ML
14 INJECTION, SOLUTION INTRAVENOUS; SUBCUTANEOUS
Status: DISCONTINUED | OUTPATIENT
Start: 2020-08-07 | End: 2020-08-11

## 2020-08-07 RX ORDER — SODIUM CHLORIDE 9 MG/ML
INJECTION, SOLUTION INTRAVENOUS CONTINUOUS
Status: DISCONTINUED | OUTPATIENT
Start: 2020-08-07 | End: 2020-08-07

## 2020-08-07 RX ORDER — HYDROMORPHONE HCL IN 0.9% NACL 6 MG/30 ML
PATIENT CONTROLLED ANALGESIA SYRINGE INTRAVENOUS CONTINUOUS
Status: DISCONTINUED | OUTPATIENT
Start: 2020-08-07 | End: 2020-08-16

## 2020-08-07 RX ORDER — INSULIN ASPART 100 [IU]/ML
14 INJECTION, SOLUTION INTRAVENOUS; SUBCUTANEOUS
Status: DISCONTINUED | OUTPATIENT
Start: 2020-08-08 | End: 2020-08-11

## 2020-08-07 RX ORDER — NALOXONE HCL 0.4 MG/ML
0.02 VIAL (ML) INJECTION
Status: DISCONTINUED | OUTPATIENT
Start: 2020-08-07 | End: 2020-08-21 | Stop reason: HOSPADM

## 2020-08-07 RX ORDER — HYDROMORPHONE HYDROCHLORIDE 1 MG/ML
1 INJECTION, SOLUTION INTRAMUSCULAR; INTRAVENOUS; SUBCUTANEOUS ONCE
Status: COMPLETED | OUTPATIENT
Start: 2020-08-07 | End: 2020-08-07

## 2020-08-07 RX ADMIN — INSULIN ASPART 4 UNITS: 100 INJECTION, SOLUTION INTRAVENOUS; SUBCUTANEOUS at 09:08

## 2020-08-07 RX ADMIN — METOPROLOL TARTRATE 5 MG: 5 INJECTION, SOLUTION INTRAVENOUS at 05:08

## 2020-08-07 RX ADMIN — ERTAPENEM 1 G: 1 INJECTION INTRAMUSCULAR; INTRAVENOUS at 12:08

## 2020-08-07 RX ADMIN — METOPROLOL TARTRATE 5 MG: 5 INJECTION, SOLUTION INTRAVENOUS at 12:08

## 2020-08-07 RX ADMIN — Medication 10 ML: at 06:08

## 2020-08-07 RX ADMIN — ENOXAPARIN SODIUM 40 MG: 40 INJECTION SUBCUTANEOUS at 05:08

## 2020-08-07 RX ADMIN — HYDROMORPHONE HYDROCHLORIDE 1 MG: 1 INJECTION, SOLUTION INTRAMUSCULAR; INTRAVENOUS; SUBCUTANEOUS at 08:08

## 2020-08-07 RX ADMIN — CIPROFLOXACIN 400 MG: 2 INJECTION, SOLUTION INTRAVENOUS at 09:08

## 2020-08-07 RX ADMIN — MAGNESIUM SULFATE HEPTAHYDRATE: 500 INJECTION, SOLUTION INTRAMUSCULAR; INTRAVENOUS at 11:08

## 2020-08-07 RX ADMIN — INSULIN ASPART 4 UNITS: 100 INJECTION, SOLUTION INTRAVENOUS; SUBCUTANEOUS at 04:08

## 2020-08-07 RX ADMIN — INSULIN ASPART 11 UNITS: 100 INJECTION, SOLUTION INTRAVENOUS; SUBCUTANEOUS at 04:08

## 2020-08-07 RX ADMIN — INSULIN ASPART 2 UNITS: 100 INJECTION, SOLUTION INTRAVENOUS; SUBCUTANEOUS at 05:08

## 2020-08-07 RX ADMIN — METOPROLOL TARTRATE 5 MG: 5 INJECTION, SOLUTION INTRAVENOUS at 01:08

## 2020-08-07 RX ADMIN — PANTOPRAZOLE SODIUM 40 MG: 40 INJECTION, POWDER, LYOPHILIZED, FOR SOLUTION INTRAVENOUS at 08:08

## 2020-08-07 RX ADMIN — METOPROLOL TARTRATE 5 MG: 5 INJECTION, SOLUTION INTRAVENOUS at 11:08

## 2020-08-07 RX ADMIN — INSULIN ASPART 4 UNITS: 100 INJECTION, SOLUTION INTRAVENOUS; SUBCUTANEOUS at 12:08

## 2020-08-07 RX ADMIN — I.V. FAT EMULSION 250 ML: 20 EMULSION INTRAVENOUS at 11:08

## 2020-08-07 RX ADMIN — ACETAMINOPHEN 1000 MG: 10 INJECTION, SOLUTION INTRAVENOUS at 05:08

## 2020-08-07 RX ADMIN — SODIUM CHLORIDE 125 MG: 9 INJECTION, SOLUTION INTRAVENOUS at 09:08

## 2020-08-07 RX ADMIN — IBUPROFEN 800 MG: 800 INJECTION INTRAVENOUS at 11:08

## 2020-08-07 RX ADMIN — Medication 10 ML: at 12:08

## 2020-08-07 RX ADMIN — HYDROMORPHONE HYDROCHLORIDE 1 MG: 1 INJECTION, SOLUTION INTRAMUSCULAR; INTRAVENOUS; SUBCUTANEOUS at 12:08

## 2020-08-07 RX ADMIN — METOPROLOL TARTRATE 5 MG: 5 INJECTION, SOLUTION INTRAVENOUS at 06:08

## 2020-08-07 RX ADMIN — Medication: at 06:08

## 2020-08-07 RX ADMIN — LIDOCAINE 1 PATCH: 50 PATCH TOPICAL at 05:08

## 2020-08-07 RX ADMIN — INSULIN ASPART 11 UNITS: 100 INJECTION, SOLUTION INTRAVENOUS; SUBCUTANEOUS at 09:08

## 2020-08-07 RX ADMIN — INSULIN ASPART 14 UNITS: 100 INJECTION, SOLUTION INTRAVENOUS; SUBCUTANEOUS at 09:08

## 2020-08-07 RX ADMIN — PROMETHAZINE HYDROCHLORIDE 6.25 MG: 25 INJECTION INTRAMUSCULAR; INTRAVENOUS at 11:08

## 2020-08-07 RX ADMIN — INSULIN ASPART 14 UNITS: 100 INJECTION, SOLUTION INTRAVENOUS; SUBCUTANEOUS at 05:08

## 2020-08-07 RX ADMIN — IBUPROFEN 800 MG: 800 INJECTION INTRAVENOUS at 12:08

## 2020-08-07 RX ADMIN — HYDROMORPHONE HYDROCHLORIDE 1 MG: 1 INJECTION, SOLUTION INTRAMUSCULAR; INTRAVENOUS; SUBCUTANEOUS at 03:08

## 2020-08-07 RX ADMIN — HYDROMORPHONE HYDROCHLORIDE 1 MG: 1 INJECTION, SOLUTION INTRAMUSCULAR; INTRAVENOUS; SUBCUTANEOUS at 04:08

## 2020-08-07 RX ADMIN — ONDANSETRON 8 MG: 2 INJECTION INTRAMUSCULAR; INTRAVENOUS at 08:08

## 2020-08-07 RX ADMIN — ONDANSETRON 8 MG: 2 INJECTION INTRAMUSCULAR; INTRAVENOUS at 04:08

## 2020-08-07 RX ADMIN — INSULIN ASPART 6 UNITS: 100 INJECTION, SOLUTION INTRAVENOUS; SUBCUTANEOUS at 12:08

## 2020-08-07 RX ADMIN — IBUPROFEN 800 MG: 800 INJECTION INTRAVENOUS at 05:08

## 2020-08-07 RX ADMIN — INSULIN ASPART 14 UNITS: 100 INJECTION, SOLUTION INTRAVENOUS; SUBCUTANEOUS at 12:08

## 2020-08-07 RX ADMIN — INSULIN ASPART 11 UNITS: 100 INJECTION, SOLUTION INTRAVENOUS; SUBCUTANEOUS at 12:08

## 2020-08-07 NOTE — PROGRESS NOTES
"[facility]  Endocrinology  Progress Note    Admit Date: 7/15/2020     Reason for Consult: Management of T2DM, Hyperglycemia     Surgical Procedure and Date: n/a    Lab Results   Component Value Date    HGBA1C 6.7 (H) 07/09/2020     Diabetes diagnosis year: 2015    Home Diabetes Medications:  Lantus 10 units daily if am BG >180 and Metformin 1g BID    How often checking glucose at home? once daily   BG readings on regimen: 150-low 200s  Hypoglycemia on the regimen?  No  Missed doses on regimen?  No    Diabetes Complications include:     Hyperglycemia    Complicating diabetes co morbidities:   CKD      HPI:   Patient is a 70 y.o. female with a diagnosis of T2DM, HTN, and complicated surgical history, including h/o colon resection and Aiyana procedure, takedown colostomy, colostomy closure, clot of colo cutaneous fistula. She presnts with abdominal pain and vomiting. 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 CT scan from OSH showed partially obstructed small bowel. She was admitted to [facility]. Endocrinology consulted for management of T2DM. Of note, patient's primary language is Zimbabwean.  An  was used for this consult.       Interval HPI:   Overnight events:  BG Continues to rise above goal on current SQ insulin regimen.   Diet NPO Except for: Sips with Medication (can have sips of clear liquids), Ice Chips, Medication  TPN ADULT CENTRAL LINE CUSTOM  TPN ADULT CENTRAL LINE CUSTOM  7 Days Post-Op    Eating:   NPO  Nausea: No  Hypoglycemia and intervention: No  Fever: No  TPN and/or TF: No  If yes, type of TF/TPN and rate: None    BP (!) 154/76 (BP Location: Right arm, Patient Position: Lying)   Pulse 87   Temp 99 °F (37.2 °C) (Oral)   Resp 20   Ht 5' 1" (1.549 m)   Wt 70.7 kg (155 lb 13.8 oz)   LMP  (LMP Unknown)   SpO2 96%   Breastfeeding No   BMI 29.45 kg/m²     Labs Reviewed and Include    Recent Labs   Lab 08/07/20  0430   GLU " 212*   CALCIUM 8.3*   ALBUMIN 1.2*   PROT 5.5*   *   K 4.1   CO2 28      BUN 10   CREATININE 0.8   ALKPHOS 285*   ALT 25   AST 18   BILITOT 0.3     Lab Results   Component Value Date    WBC 9.54 08/07/2020    HGB 6.1 (L) 08/07/2020    HCT 19.5 (LL) 08/07/2020    MCV 79 (L) 08/07/2020     (H) 08/07/2020     No results for input(s): TSH, FREET4 in the last 168 hours.  Lab Results   Component Value Date    HGBA1C 6.7 (H) 07/09/2020       Nutritional status:   Body mass index is 29.45 kg/m².  Lab Results   Component Value Date    ALBUMIN 1.2 (L) 08/07/2020    ALBUMIN 1.2 (L) 08/06/2020    ALBUMIN 1.2 (L) 08/05/2020     Lab Results   Component Value Date    PREALBUMIN 21 07/15/2020       Estimated Creatinine Clearance: 59.7 mL/min (based on SCr of 0.8 mg/dL).    Accu-Checks  Recent Labs     08/05/20  2141 08/06/20  0119 08/06/20  0410 08/06/20  0819 08/06/20  1202 08/06/20  1629 08/06/20  2044 08/07/20  0030 08/07/20  0443 08/07/20  0903   POCTGLUCOSE 217* 185* 198* 197* 222* 233* 202* 288* 228* 231*       Current Medications and/or Treatments Impacting Glycemic Control  Immunotherapy:    Immunosuppressants     None        Steroids:   Hormones (From admission, onward)    None        Pressors:    Autonomic Drugs (From admission, onward)    None        Hyperglycemia/Diabetes Medications:   Antihyperglycemics (From admission, onward)    Start     Stop Route Frequency Ordered    08/07/20 1600  insulin aspart U-100 pen 11 Units      -- SubQ Every 24 hours (non-standard times) 08/06/20 1647    08/07/20 1200  insulin aspart U-100 pen 11 Units      -- SubQ Every 24 hours (non-standard times) 08/06/20 1647    08/07/20 0800  insulin aspart U-100 pen 11 Units      -- SubQ Every 24 hours (non-standard times) 08/06/20 1647    08/07/20 0400  insulin aspart U-100 pen 11 Units      -- SubQ Every 24 hours (non-standard times) 08/06/20 1647    08/07/20 0000  insulin aspart U-100 pen 11 Units      -- SubQ Every 24 hours  (non-standard times) 08/06/20 1647    08/06/20 2000  insulin aspart U-100 pen 11 Units      -- SubQ Every 24 hours (non-standard times) 08/06/20 1647    08/04/20 1409  insulin aspart U-100 pen 1-10 Units      -- SubQ Every 4 hours PRN 08/04/20 1310          ASSESSMENT and PLAN    * Cutaneous fistula  Managed per primary team  Optimize BG control to improve wound healing        Type 2 diabetes mellitus without complication, with long-term current use of insulin  BG goal 140-180    - Increase Novolog to 14units q 4 hrs while on TPN. HOLD if TPN is stopped for any reason.   - Continue Moderate Dose Correction Scale  - BG monitoring q 4 hrs while on TPN (coordinate with meals during the day)     ** Please call Endocrine for any BG related issues **  ** Please call Endocrine if TPN is restarted or diet changes **    Discharge plans:  TBD. Please notify endocrinology prior to discharge.       On total parenteral nutrition (TPN)  May elevate BG readings  May increase insulin requirements        Essential hypertension  Managed per primary team  Condition may cause insulin resistance             Medardo Godoy, NP  Endocrinology  [facility]

## 2020-08-07 NOTE — PROGRESS NOTES
[facility]  Colorectal Surgery  Progress Note    Patient Name: Azucena Morrison  MRN: [id number]  Admission Date: 7/15/2020  Hospital Length of Stay: 23 days  Attending Physician: Fabiana Valiente MD    Subjective:     Interval History:   No acute events. tmax over past 24h 100.2F. NG tube with 1300 cc brown output. Hg 6.1. IR yesterday with drain placed and straw colored fluid aspirated. Endorses pain this morning which is the same that she has been having. Urinating, denies dysuria. 75 cc liquid brown stool from ostomy yesterday.    Post-Op Info:  Procedure(s) (LRB):  RESECTION, COLON, LOW ANTERIOR (N/A)  CYSTOSCOPY (N/A)  CATHETERIZATION, URETER (Bilateral)  CREATION, ILEOSTOMY  LYSIS, ADHESIONS   7 Days Post-Op      Medications:  Continuous Infusions:   dextrose 10 % in water (D10W)      TPN ADULT CENTRAL LINE CUSTOM 75 mL/hr at 08/06/20 2130     Scheduled Meds:   ciprofloxacin  400 mg Intravenous Q12H    enoxaparin  40 mg Subcutaneous Q24H    fat emulsion 20%  250 mL Intravenous Daily    ibuprofen  800 mg Intravenous Q6H    insulin aspart U-100  11 Units Subcutaneous Q24H    insulin aspart U-100  11 Units Subcutaneous Q24H    insulin aspart U-100  11 Units Subcutaneous Q24H    insulin aspart U-100  11 Units Subcutaneous Q24H    insulin aspart U-100  11 Units Subcutaneous Q24H    insulin aspart U-100  11 Units Subcutaneous Q24H    lidocaine  1 patch Transdermal Q24H    metoprolol  5 mg Intravenous Q6H    pantoprozole (PROTONIX) IV  40 mg Intravenous Q24H    sodium chloride 0.9%  10 mL Intravenous Q6H     PRN Meds:   calcium carbonate    dextrose 10 % in water (D10W)    dextrose 50%    glucagon (human recombinant)    HYDROmorphone    HYDROmorphone    insulin aspart U-100    ondansetron    ondansetron    promethazine (PHENERGAN) IVPB    sodium chloride 0.9%    sodium chloride 0.9%    traMADoL        Objective:     Vital Signs (Most Recent):  Temp: 98.6 °F (37 °C)  (08/07/20 0512)  Pulse: 87 (08/07/20 0516)  Resp: 18 (08/07/20 0512)  BP: (!) 146/69 (08/07/20 0516)  SpO2: 96 % (08/07/20 0512) Vital Signs (24h Range):  Temp:  [98.6 °F (37 °C)-100.2 °F (37.9 °C)] 98.6 °F (37 °C)  Pulse:  [81-99] 87  Resp:  [16-20] 18  SpO2:  [96 %-99 %] 96 %  BP: (129-175)/(62-85) 146/69     Intake/Output - Last 3 Shifts       08/05 0700 - 08/06 0659 08/06 0700 - 08/07 0659 08/07 0700 - 08/08 0659    P.O. 800 0     I.V. (mL/kg) 50 (0.7)      IV Piggyback 350 850     TPN 1343.8 910     Total Intake(mL/kg) 2543.8 (36) 1760 (24.9)     Urine (mL/kg/hr) 2650 (1.6) 500 (0.3)     Emesis/NG output 200 100     Drains  1540     Other  70     Stool 0 75     Total Output 2850 2285     Net -306.3 -525            Urine Occurrence 1 x 5 x     Stool Occurrence 0 x 0 x     Emesis Occurrence 1 x            Physical Exam  Constitutional:       General: She is not in acute distress.  HENT:      Head: Normocephalic.   Eyes:      Conjunctiva/sclera: Conjunctivae normal.   Neck:      Trachea: No tracheal deviation.   NG tube in place with 600 cc brown output on placement    Cardiovascular:      Rate and Rhythm: Regular rhythm.   Pulmonary:      Effort: Pulmonary effort is normal. No respiratory distress.   Abdominal:      Comments:   Skin separation at inferior aspect of wound with serosang + purulence v fat necrosis able to be expressed through wound. Ostomy pink, small amt liquid brown stool and no gas in bag. Tympany.    Neurological:      Mental Status: She is alert and oriented to person, place, and time.         Significant Labs:  BMP (Last 3 Results):   Recent Labs   Lab 08/05/20  0300 08/05/20  0430 08/06/20  0300 08/07/20  0430   GLU  --  225* 183* 212*   NA  --  135* 136 134*   K  --  3.6 4.7 4.1   CL  --  105 103 100   CO2  --  25 28 28   BUN  --  13 12 10   CREATININE  --  0.8 0.8 0.8   CALCIUM  --  7.8* 8.5* 8.3*   MG 1.7  --  1.9 1.7     CBC (Last 3 Results):   Recent Labs   Lab 08/05/20  4486  08/06/20  0300 08/07/20  0430   WBC 6.84 10.54 9.54   RBC 2.59* 2.74* 2.46*   HGB 6.5* 6.9* 6.1*   HCT 20.3* 21.7* 19.5*    355* 361*   MCV 78* 79* 79*   MCH 25.1* 25.2* 24.8*   MCHC 32.0 31.8* 31.3*         Assessment/Plan:     * Cutaneous fistula  70 y.o. female 7 Days Post-Op for Procedure(s) (LRB):  RESECTION, COLON, LOW ANTERIOR (N/A)  CYSTOSCOPY (N/A)  CATHETERIZATION, URETER (Bilateral)  CREATION, ILEOSTOMY  LYSIS, ADHESIONS    CRP down to 262.    WBC stable at 9k  NPO and NG tube.  Continue TPN + IL, add maintenance IVF for NG losses  IV pain meds  IS  OOB  PT/OT    Continue communication with use of       On total parenteral nutrition (TPN)  Reorder.    UTI (urinary tract infection)  Treat with Cipro x 5 days (first day 8/5/20) - growing e.coli    Acute blood loss anemia  Hg 6.1 this morning. Will discuss transfusion w staff.      Chronic kidney disease, stage 3  Monitor labs  Monitor I&O    Creatinine 0.8. adequate UOP.    Generalized abdominal pain  Continue IV pain meds      Essential hypertension  Monitor BP  Currently holding home carvedilol and HCTZ - had restarted it but now is NPO so added scheduled metoprolol 5 mg q6h injections.    Type 2 diabetes mellitus without complication, with long-term current use of insulin  SSI  On scheduled insulin ordered by Endo team.  Accu check Q6    Endocrinology consulted. Appreciate their recommendations.           Pat Turner MD  Colorectal Surgery  [facility]

## 2020-08-07 NOTE — NURSING
POC reviewed with patient with interperter, voiced understanding. Awake however drifts off to sleep off and on but easily arousable. Midline incision with island telfa dressing, noted some leakage from lower incision site covered with gauze, dressing changed per MD this am. Ileostomy to right lower quad with 50ml of malodorous stool.  NG to right nare to LIS with brownish, foul odor drainage. NG was placed this am due to vomiting with no relief from antiemetics.  PICC line to left upper arm with TPN at 75 ml/hr. NPO. Blood sugars monitored as ordered with routine Aspart and sliding scale coverage. Blood sugars remained in 200s today. On room air. Pain managed with PRN pain meds with relief. CT today and will be followed by possible IR drain this evening. Up to toilet with one person assist with adequate output SCDs in place. Bed in low and locked position, call light in reach and answered in person. Will continue to monitor and await IR drain placement possibly tonight.

## 2020-08-07 NOTE — ASSESSMENT & PLAN NOTE
70 y.o. female 7 Days Post-Op for Procedure(s) (LRB):  RESECTION, COLON, LOW ANTERIOR (N/A)  CYSTOSCOPY (N/A)  CATHETERIZATION, URETER (Bilateral)  CREATION, ILEOSTOMY  LYSIS, ADHESIONS    WBC stable at 9k  NPO and NG tube.  Continue TPN + IL, add maintenance IVF for NG losses  IV pain meds  IS  OOB  PT/OT    Continue communication with use of

## 2020-08-07 NOTE — SUBJECTIVE & OBJECTIVE
"Interval HPI:   Overnight events:  BG Continues to rise above goal on current SQ insulin regimen.   Diet NPO Except for: Sips with Medication (can have sips of clear liquids), Ice Chips, Medication  TPN ADULT CENTRAL LINE CUSTOM  TPN ADULT CENTRAL LINE CUSTOM  7 Days Post-Op    Eating:   NPO  Nausea: No  Hypoglycemia and intervention: No  Fever: No  TPN and/or TF: No  If yes, type of TF/TPN and rate: None    BP (!) 154/76 (BP Location: Right arm, Patient Position: Lying)   Pulse 87   Temp 99 °F (37.2 °C) (Oral)   Resp 20   Ht 5' 1" (1.549 m)   Wt 70.7 kg (155 lb 13.8 oz)   LMP  (LMP Unknown)   SpO2 96%   Breastfeeding No   BMI 29.45 kg/m²     Labs Reviewed and Include    Recent Labs   Lab 08/07/20  0430   *   CALCIUM 8.3*   ALBUMIN 1.2*   PROT 5.5*   *   K 4.1   CO2 28      BUN 10   CREATININE 0.8   ALKPHOS 285*   ALT 25   AST 18   BILITOT 0.3     Lab Results   Component Value Date    WBC 9.54 08/07/2020    HGB 6.1 (L) 08/07/2020    HCT 19.5 (LL) 08/07/2020    MCV 79 (L) 08/07/2020     (H) 08/07/2020     No results for input(s): TSH, FREET4 in the last 168 hours.  Lab Results   Component Value Date    HGBA1C 6.7 (H) 07/09/2020       Nutritional status:   Body mass index is 29.45 kg/m².  Lab Results   Component Value Date    ALBUMIN 1.2 (L) 08/07/2020    ALBUMIN 1.2 (L) 08/06/2020    ALBUMIN 1.2 (L) 08/05/2020     Lab Results   Component Value Date    PREALBUMIN 21 07/15/2020       Estimated Creatinine Clearance: 59.7 mL/min (based on SCr of 0.8 mg/dL).    Accu-Checks  Recent Labs     08/05/20  2141 08/06/20  0119 08/06/20  0410 08/06/20  0819 08/06/20  1202 08/06/20  1629 08/06/20  2044 08/07/20  0030 08/07/20  0443 08/07/20  0903   POCTGLUCOSE 217* 185* 198* 197* 222* 233* 202* 288* 228* 231*       Current Medications and/or Treatments Impacting Glycemic Control  Immunotherapy:    Immunosuppressants     None        Steroids:   Hormones (From admission, onward)    None    "     Pressors:    Autonomic Drugs (From admission, onward)    None        Hyperglycemia/Diabetes Medications:   Antihyperglycemics (From admission, onward)    Start     Stop Route Frequency Ordered    08/07/20 1600  insulin aspart U-100 pen 11 Units      -- SubQ Every 24 hours (non-standard times) 08/06/20 1647 08/07/20 1200  insulin aspart U-100 pen 11 Units      -- SubQ Every 24 hours (non-standard times) 08/06/20 1647 08/07/20 0800  insulin aspart U-100 pen 11 Units      -- SubQ Every 24 hours (non-standard times) 08/06/20 1647 08/07/20 0400  insulin aspart U-100 pen 11 Units      -- SubQ Every 24 hours (non-standard times) 08/06/20 1647 08/07/20 0000  insulin aspart U-100 pen 11 Units      -- SubQ Every 24 hours (non-standard times) 08/06/20 1647    08/06/20 2000  insulin aspart U-100 pen 11 Units      -- SubQ Every 24 hours (non-standard times) 08/06/20 1647    08/04/20 1409  insulin aspart U-100 pen 1-10 Units      -- SubQ Every 4 hours PRN 08/04/20 1310

## 2020-08-07 NOTE — PLAN OF CARE
"Patient is not medically ready for discharge.    "No acute events. tmax over past 24h 100.2F. NG tube with 1300 cc brown output. Hg 6.1. IR yesterday with drain placed and straw colored fluid aspirated. Endorses pain this morning which is the same that she has been having. Urinating, denies dysuria. 75 cc liquid brown stool from ostomy yesterday."       08/07/20 0941   Discharge Reassessment   Assessment Type Discharge Planning Reassessment   Discharge Plan A Home Health   Discharge Plan B Other  (TBD)   DME Needed Upon Discharge  other (see comments)  (TBD)   Anticipated Discharge Disposition Home-Health   Can the patient/caregiver answer the patient profile reliably? Yes, cognitively intact     Rosy Lynch RN, CM   Ext: [phone]    "

## 2020-08-07 NOTE — PT/OT/SLP PROGRESS
"Occupational Therapy      Patient Name:  Azucena Morrison   MRN:  [id number]    Patient not seen today secondary to Pain.Pt refusing to participate as soon as OT walks in door before OT could initiate use of MARS for translation. Pt stating "No, no, I am not getting up hurting too bad, later later."  OT unable to make second attempt. Will follow-up as scheduled.    Trinh Loyola, OT  8/7/2020  "

## 2020-08-07 NOTE — SUBJECTIVE & OBJECTIVE
Subjective:     Interval History:   No acute events. tmax over past 24h 100.2F. NG tube with 1300 cc brown output. Hg 6.1. IR yesterday with drain placed and straw colored fluid aspirated. Endorses pain this morning which is the same that she has been having. Urinating, denies dysuria. 75 cc liquid brown stool from ostomy yesterday.    Post-Op Info:  Procedure(s) (LRB):  RESECTION, COLON, LOW ANTERIOR (N/A)  CYSTOSCOPY (N/A)  CATHETERIZATION, URETER (Bilateral)  CREATION, ILEOSTOMY  LYSIS, ADHESIONS   7 Days Post-Op      Medications:  Continuous Infusions:   dextrose 10 % in water (D10W)      TPN ADULT CENTRAL LINE CUSTOM 75 mL/hr at 08/06/20 2130     Scheduled Meds:   ciprofloxacin  400 mg Intravenous Q12H    enoxaparin  40 mg Subcutaneous Q24H    fat emulsion 20%  250 mL Intravenous Daily    ibuprofen  800 mg Intravenous Q6H    insulin aspart U-100  11 Units Subcutaneous Q24H    insulin aspart U-100  11 Units Subcutaneous Q24H    insulin aspart U-100  11 Units Subcutaneous Q24H    insulin aspart U-100  11 Units Subcutaneous Q24H    insulin aspart U-100  11 Units Subcutaneous Q24H    insulin aspart U-100  11 Units Subcutaneous Q24H    lidocaine  1 patch Transdermal Q24H    metoprolol  5 mg Intravenous Q6H    pantoprozole (PROTONIX) IV  40 mg Intravenous Q24H    sodium chloride 0.9%  10 mL Intravenous Q6H     PRN Meds:   calcium carbonate    dextrose 10 % in water (D10W)    dextrose 50%    glucagon (human recombinant)    HYDROmorphone    HYDROmorphone    insulin aspart U-100    ondansetron    ondansetron    promethazine (PHENERGAN) IVPB    sodium chloride 0.9%    sodium chloride 0.9%    traMADoL        Objective:     Vital Signs (Most Recent):  Temp: 98.6 °F (37 °C) (08/07/20 0512)  Pulse: 87 (08/07/20 0516)  Resp: 18 (08/07/20 0512)  BP: (!) 146/69 (08/07/20 0516)  SpO2: 96 % (08/07/20 0512) Vital Signs (24h Range):  Temp:  [98.6 °F (37 °C)-100.2 °F (37.9 °C)] 98.6 °F (37 °C)  Pulse:   [81-99] 87  Resp:  [16-20] 18  SpO2:  [96 %-99 %] 96 %  BP: (129-175)/(62-85) 146/69     Intake/Output - Last 3 Shifts       08/05 0700 - 08/06 0659 08/06 0700 - 08/07 0659 08/07 0700 - 08/08 0659    P.O. 800 0     I.V. (mL/kg) 50 (0.7)      IV Piggyback 350 850     TPN 1343.8 910     Total Intake(mL/kg) 2543.8 (36) 1760 (24.9)     Urine (mL/kg/hr) 2650 (1.6) 500 (0.3)     Emesis/NG output 200 100     Drains  1540     Other  70     Stool 0 75     Total Output 2850 2285     Net -306.3 -525            Urine Occurrence 1 x 5 x     Stool Occurrence 0 x 0 x     Emesis Occurrence 1 x            Physical Exam  Constitutional:       General: She is not in acute distress.  HENT:      Head: Normocephalic.   Eyes:      Conjunctiva/sclera: Conjunctivae normal.   Neck:      Trachea: No tracheal deviation.   NG tube in place with 600 cc brown output on placement    Cardiovascular:      Rate and Rhythm: Regular rhythm.   Pulmonary:      Effort: Pulmonary effort is normal. No respiratory distress.   Abdominal:      Comments:   Skin separation at inferior aspect of wound with serosang + purulence v fat necrosis able to be expressed through wound. Ostomy pink, small amt liquid brown stool and no gas in bag. Tympany.    Neurological:      Mental Status: She is alert and oriented to person, place, and time.         Significant Labs:  BMP (Last 3 Results):   Recent Labs   Lab 08/05/20 0300 08/05/20 0430 08/06/20 0300 08/07/20 0430   GLU  --  225* 183* 212*   NA  --  135* 136 134*   K  --  3.6 4.7 4.1   CL  --  105 103 100   CO2  --  25 28 28   BUN  --  13 12 10   CREATININE  --  0.8 0.8 0.8   CALCIUM  --  7.8* 8.5* 8.3*   MG 1.7  --  1.9 1.7     CBC (Last 3 Results):   Recent Labs   Lab 08/05/20  0430 08/06/20  0300 08/07/20  0430   WBC 6.84 10.54 9.54   RBC 2.59* 2.74* 2.46*   HGB 6.5* 6.9* 6.1*   HCT 20.3* 21.7* 19.5*    355* 361*   MCV 78* 79* 79*   MCH 25.1* 25.2* 24.8*   MCHC 32.0 31.8* 31.3*

## 2020-08-07 NOTE — PROGRESS NOTES
Follow up for ileostomy.     Received pt lying in bed with NG tube in use and draining to bedside suction with nurse at bedside. Pt's nurse reports that pt is in pain and is still nauseated. Nurse requested assistance with turning pt due assess sacrum.     Upon assessment of sacrum: moist intact skin without any open areas or skin breakdown noted at this time. Barrier cream applied and suggested to nurse that pt might benefit from a Pure Wick female catheter to help with moisture management an a waffle overlay as a pressure prevention measure.    Ostomy pouch intact with minimal output noted. Pt nauseated and in pain and not receptive to teaching today due to not feeling well. Will send pt additional convexity pouches.     Chart reflects that IR placed a drain yesterday with successful drainage of abdominal abscess.     Recommendations:  -Nursing to cleanse sacrum with cleansing cloths or Aloe Vesta Foam cleanser and apply moisture barrier (purple top) BID and PRN cleaning.  -Nursing to continue pressure prevention interventions as directed. Pressure injury prevention interventions to include repositioning with wedge pillow, heel protectors and waffle cushion overlay.       Plan of care discussed with nurse. Instructed nurse to call if further assistance is needed. Wound Ostomy care will continue to plan for follow up with pt. [id number]

## 2020-08-07 NOTE — PT/OT/SLP PROGRESS
Physical Therapy      Patient Name:  Azucena Morrison   MRN:  [id number]    Patient not seen today secondary. Per chart review pt remains appropriate for therapy services. Will follow-up per POC as appropriate.     Per OT pt refused services on entry before OT could initiate use of MARS for translation. Will plan for follow-up Monday.    Froylan Chacon, PTA

## 2020-08-07 NOTE — ASSESSMENT & PLAN NOTE
BG goal 140-180    - Increase Novolog to 14units q 4 hrs while on TPN. HOLD if TPN is stopped for any reason.   - Continue Moderate Dose Correction Scale  - BG monitoring q 4 hrs while on TPN (coordinate with meals during the day)     ** Please call Endocrine for any BG related issues **  ** Please call Endocrine if TPN is restarted or diet changes **    Discharge plans:  TBD. Please notify endocrinology prior to discharge.

## 2020-08-07 NOTE — TELEPHONE ENCOUNTER
----- Message from Toma French sent at 8/7/2020  3:10 PM CDT -----  Regarding: Post procedure  Contact: Azucena [phone]  Calling to speak with doctor regarding worsening symptoms post procedure. Please call

## 2020-08-08 LAB
ALBUMIN SERPL BCP-MCNC: 1.3 G/DL (ref 3.5–5.2)
ALP SERPL-CCNC: 250 U/L (ref 55–135)
ALT SERPL W/O P-5'-P-CCNC: 19 U/L (ref 10–44)
ANION GAP SERPL CALC-SCNC: 8 MMOL/L (ref 8–16)
AST SERPL-CCNC: 19 U/L (ref 10–40)
BACTERIA SPEC AEROBE CULT: ABNORMAL
BASOPHILS # BLD AUTO: 0.03 K/UL (ref 0–0.2)
BASOPHILS # BLD AUTO: 0.03 K/UL (ref 0–0.2)
BASOPHILS NFR BLD: 0.2 % (ref 0–1.9)
BASOPHILS NFR BLD: 0.2 % (ref 0–1.9)
BILIRUB SERPL-MCNC: 0.7 MG/DL (ref 0.1–1)
BUN SERPL-MCNC: 14 MG/DL (ref 8–23)
CALCIUM SERPL-MCNC: 8.5 MG/DL (ref 8.7–10.5)
CHLORIDE SERPL-SCNC: 101 MMOL/L (ref 95–110)
CO2 SERPL-SCNC: 26 MMOL/L (ref 23–29)
CREAT SERPL-MCNC: 0.8 MG/DL (ref 0.5–1.4)
CRP SERPL-MCNC: 263.5 MG/L (ref 0–8.2)
DIFFERENTIAL METHOD: ABNORMAL
DIFFERENTIAL METHOD: ABNORMAL
EOSINOPHIL # BLD AUTO: 0.2 K/UL (ref 0–0.5)
EOSINOPHIL # BLD AUTO: 0.4 K/UL (ref 0–0.5)
EOSINOPHIL NFR BLD: 0.9 % (ref 0–8)
EOSINOPHIL NFR BLD: 2.8 % (ref 0–8)
ERYTHROCYTE [DISTWIDTH] IN BLOOD BY AUTOMATED COUNT: 19 % (ref 11.5–14.5)
ERYTHROCYTE [DISTWIDTH] IN BLOOD BY AUTOMATED COUNT: 19 % (ref 11.5–14.5)
EST. GFR  (AFRICAN AMERICAN): >60 ML/MIN/1.73 M^2
EST. GFR  (NON AFRICAN AMERICAN): >60 ML/MIN/1.73 M^2
GLUCOSE SERPL-MCNC: 131 MG/DL (ref 70–110)
HCT VFR BLD AUTO: 24.6 % (ref 37–48.5)
HCT VFR BLD AUTO: 25.2 % (ref 37–48.5)
HGB BLD-MCNC: 7.7 G/DL (ref 12–16)
HGB BLD-MCNC: 7.9 G/DL (ref 12–16)
IMM GRANULOCYTES # BLD AUTO: 0.25 K/UL (ref 0–0.04)
IMM GRANULOCYTES # BLD AUTO: 0.31 K/UL (ref 0–0.04)
IMM GRANULOCYTES NFR BLD AUTO: 1.8 % (ref 0–0.5)
IMM GRANULOCYTES NFR BLD AUTO: 1.9 % (ref 0–0.5)
LYMPHOCYTES # BLD AUTO: 1.2 K/UL (ref 1–4.8)
LYMPHOCYTES # BLD AUTO: 1.3 K/UL (ref 1–4.8)
LYMPHOCYTES NFR BLD: 7.4 % (ref 18–48)
LYMPHOCYTES NFR BLD: 9.3 % (ref 18–48)
MAGNESIUM SERPL-MCNC: 1.7 MG/DL (ref 1.6–2.6)
MCH RBC QN AUTO: 25.4 PG (ref 27–31)
MCH RBC QN AUTO: 25.6 PG (ref 27–31)
MCHC RBC AUTO-ENTMCNC: 31.3 G/DL (ref 32–36)
MCHC RBC AUTO-ENTMCNC: 31.3 G/DL (ref 32–36)
MCV RBC AUTO: 81 FL (ref 82–98)
MCV RBC AUTO: 82 FL (ref 82–98)
MONOCYTES # BLD AUTO: 0.8 K/UL (ref 0.3–1)
MONOCYTES # BLD AUTO: 0.8 K/UL (ref 0.3–1)
MONOCYTES NFR BLD: 4.9 % (ref 4–15)
MONOCYTES NFR BLD: 6 % (ref 4–15)
NEUTROPHILS # BLD AUTO: 10.6 K/UL (ref 1.8–7.7)
NEUTROPHILS # BLD AUTO: 14.5 K/UL (ref 1.8–7.7)
NEUTROPHILS NFR BLD: 79.8 % (ref 38–73)
NEUTROPHILS NFR BLD: 84.8 % (ref 38–73)
NRBC BLD-RTO: 0 /100 WBC
NRBC BLD-RTO: 0 /100 WBC
PHOSPHATE SERPL-MCNC: 5.2 MG/DL (ref 2.7–4.5)
PLATELET # BLD AUTO: 386 K/UL (ref 150–350)
PLATELET # BLD AUTO: 420 K/UL (ref 150–350)
PMV BLD AUTO: 8.4 FL (ref 9.2–12.9)
PMV BLD AUTO: 8.8 FL (ref 9.2–12.9)
POCT GLUCOSE: 122 MG/DL (ref 70–110)
POCT GLUCOSE: 128 MG/DL (ref 70–110)
POCT GLUCOSE: 137 MG/DL (ref 70–110)
POCT GLUCOSE: 140 MG/DL (ref 70–110)
POCT GLUCOSE: 164 MG/DL (ref 70–110)
POCT GLUCOSE: 166 MG/DL (ref 70–110)
POCT GLUCOSE: 172 MG/DL (ref 70–110)
POTASSIUM SERPL-SCNC: 4.1 MMOL/L (ref 3.5–5.1)
PROT SERPL-MCNC: 6 G/DL (ref 6–8.4)
RBC # BLD AUTO: 3.03 M/UL (ref 4–5.4)
RBC # BLD AUTO: 3.09 M/UL (ref 4–5.4)
SODIUM SERPL-SCNC: 135 MMOL/L (ref 136–145)
WBC # BLD AUTO: 13.28 K/UL (ref 3.9–12.7)
WBC # BLD AUTO: 17.08 K/UL (ref 3.9–12.7)

## 2020-08-08 PROCEDURE — 25000003 PHARM REV CODE 250: Performed by: STUDENT IN AN ORGANIZED HEALTH CARE EDUCATION/TRAINING PROGRAM

## 2020-08-08 PROCEDURE — 99900035 HC TECH TIME PER 15 MIN (STAT)

## 2020-08-08 PROCEDURE — 80053 COMPREHEN METABOLIC PANEL: CPT

## 2020-08-08 PROCEDURE — 84100 ASSAY OF PHOSPHORUS: CPT

## 2020-08-08 PROCEDURE — B4185 PARENTERAL SOL 10 GM LIPIDS: HCPCS | Performed by: COLON & RECTAL SURGERY

## 2020-08-08 PROCEDURE — 94761 N-INVAS EAR/PLS OXIMETRY MLT: CPT

## 2020-08-08 PROCEDURE — 94770 HC EXHALED C02 TEST: CPT

## 2020-08-08 PROCEDURE — 63600175 PHARM REV CODE 636 W HCPCS: Performed by: STUDENT IN AN ORGANIZED HEALTH CARE EDUCATION/TRAINING PROGRAM

## 2020-08-08 PROCEDURE — 43752 NASAL/OROGASTRIC W/TUBE PLMT: CPT

## 2020-08-08 PROCEDURE — 85025 COMPLETE CBC W/AUTO DIFF WBC: CPT

## 2020-08-08 PROCEDURE — 63600175 PHARM REV CODE 636 W HCPCS: Performed by: SURGERY

## 2020-08-08 PROCEDURE — 86140 C-REACTIVE PROTEIN: CPT

## 2020-08-08 PROCEDURE — 20600001 HC STEP DOWN PRIVATE ROOM

## 2020-08-08 PROCEDURE — A4217 STERILE WATER/SALINE, 500 ML: HCPCS | Performed by: SURGERY

## 2020-08-08 PROCEDURE — 27000221 HC OXYGEN, UP TO 24 HOURS

## 2020-08-08 PROCEDURE — 83735 ASSAY OF MAGNESIUM: CPT

## 2020-08-08 PROCEDURE — 36430 TRANSFUSION BLD/BLD COMPNT: CPT

## 2020-08-08 PROCEDURE — 25000003 PHARM REV CODE 250: Performed by: COLON & RECTAL SURGERY

## 2020-08-08 PROCEDURE — C9113 INJ PANTOPRAZOLE SODIUM, VIA: HCPCS | Performed by: STUDENT IN AN ORGANIZED HEALTH CARE EDUCATION/TRAINING PROGRAM

## 2020-08-08 PROCEDURE — 25000003 PHARM REV CODE 250: Performed by: SURGERY

## 2020-08-08 PROCEDURE — A4216 STERILE WATER/SALINE, 10 ML: HCPCS | Performed by: STUDENT IN AN ORGANIZED HEALTH CARE EDUCATION/TRAINING PROGRAM

## 2020-08-08 PROCEDURE — 94799 UNLISTED PULMONARY SVC/PX: CPT

## 2020-08-08 PROCEDURE — 82570 ASSAY OF URINE CREATININE: CPT

## 2020-08-08 RX ADMIN — IBUPROFEN 800 MG: 800 INJECTION INTRAVENOUS at 01:08

## 2020-08-08 RX ADMIN — INSULIN ASPART 14 UNITS: 100 INJECTION, SOLUTION INTRAVENOUS; SUBCUTANEOUS at 12:08

## 2020-08-08 RX ADMIN — INSULIN ASPART 14 UNITS: 100 INJECTION, SOLUTION INTRAVENOUS; SUBCUTANEOUS at 04:08

## 2020-08-08 RX ADMIN — Medication 10 ML: at 12:08

## 2020-08-08 RX ADMIN — ONDANSETRON 4 MG: 2 INJECTION INTRAMUSCULAR; INTRAVENOUS at 03:08

## 2020-08-08 RX ADMIN — Medication: at 10:08

## 2020-08-08 RX ADMIN — PANTOPRAZOLE SODIUM 40 MG: 40 INJECTION, POWDER, LYOPHILIZED, FOR SOLUTION INTRAVENOUS at 09:08

## 2020-08-08 RX ADMIN — INSULIN ASPART 14 UNITS: 100 INJECTION, SOLUTION INTRAVENOUS; SUBCUTANEOUS at 09:08

## 2020-08-08 RX ADMIN — I.V. FAT EMULSION 250 ML: 20 EMULSION INTRAVENOUS at 11:08

## 2020-08-08 RX ADMIN — METOPROLOL TARTRATE 5 MG: 5 INJECTION, SOLUTION INTRAVENOUS at 05:08

## 2020-08-08 RX ADMIN — Medication 10 ML: at 06:08

## 2020-08-08 RX ADMIN — ENOXAPARIN SODIUM 40 MG: 40 INJECTION SUBCUTANEOUS at 06:08

## 2020-08-08 RX ADMIN — Medication: at 09:08

## 2020-08-08 RX ADMIN — SODIUM CHLORIDE 125 MG: 9 INJECTION, SOLUTION INTRAVENOUS at 02:08

## 2020-08-08 RX ADMIN — INSULIN ASPART 2 UNITS: 100 INJECTION, SOLUTION INTRAVENOUS; SUBCUTANEOUS at 09:08

## 2020-08-08 RX ADMIN — MAGNESIUM SULFATE HEPTAHYDRATE: 500 INJECTION, SOLUTION INTRAMUSCULAR; INTRAVENOUS at 11:08

## 2020-08-08 RX ADMIN — ONDANSETRON 4 MG: 2 INJECTION INTRAMUSCULAR; INTRAVENOUS at 05:08

## 2020-08-08 RX ADMIN — ONDANSETRON 8 MG: 2 INJECTION INTRAMUSCULAR; INTRAVENOUS at 10:08

## 2020-08-08 RX ADMIN — IBUPROFEN 800 MG: 800 INJECTION INTRAVENOUS at 06:08

## 2020-08-08 RX ADMIN — IBUPROFEN 800 MG: 800 INJECTION INTRAVENOUS at 07:08

## 2020-08-08 RX ADMIN — METOPROLOL TARTRATE 5 MG: 5 INJECTION, SOLUTION INTRAVENOUS at 12:08

## 2020-08-08 RX ADMIN — Medication: at 02:08

## 2020-08-08 RX ADMIN — METOPROLOL TARTRATE 5 MG: 5 INJECTION, SOLUTION INTRAVENOUS at 06:08

## 2020-08-08 RX ADMIN — ERTAPENEM 1 G: 1 INJECTION INTRAMUSCULAR; INTRAVENOUS at 12:08

## 2020-08-08 NOTE — SUBJECTIVE & OBJECTIVE
Subjective:     Interval History:   No acute events. Complaining of abdominal pain to lower belly - but improved since yesterday. NG tube was replaced after falling out overnight, set to suction now. No nausea, not passing gas, OOB to bathroom.     Post-Op Info:  Procedure(s) (LRB):  RESECTION, COLON, LOW ANTERIOR (N/A)  CYSTOSCOPY (N/A)  CATHETERIZATION, URETER (Bilateral)  CREATION, ILEOSTOMY  LYSIS, ADHESIONS   8 Days Post-Op      Medications:  Continuous Infusions:   dextrose 10 % in water (D10W)      hydromorphone in 0.9 % NaCl 6 mg/30 ml      TPN ADULT CENTRAL LINE CUSTOM 75 mL/hr at 08/07/20 6603     Scheduled Meds:   enoxaparin  40 mg Subcutaneous Q24H    ertapenem (INVANZ) IVPB  1 g Intravenous Q24H    fat emulsion 20%  250 mL Intravenous Daily    ibuprofen  800 mg Intravenous Q6H    insulin aspart U-100  14 Units Subcutaneous Q24H    insulin aspart U-100  14 Units Subcutaneous Q24H    insulin aspart U-100  14 Units Subcutaneous Q24H    insulin aspart U-100  14 Units Subcutaneous Q24H    insulin aspart U-100  14 Units Subcutaneous Q24H    insulin aspart U-100  14 Units Subcutaneous Q24H    lidocaine  1 patch Transdermal Q24H    metoprolol  5 mg Intravenous Q6H    pantoprozole (PROTONIX) IV  40 mg Intravenous Q24H    sodium chloride 0.9%  10 mL Intravenous Q6H     PRN Meds:   sodium chloride    calcium carbonate    dextrose 10 % in water (D10W)    dextrose 50%    glucagon (human recombinant)    HYDROmorphone    HYDROmorphone    insulin aspart U-100    naloxone    ondansetron    ondansetron    promethazine (PHENERGAN) IVPB    sodium chloride 0.9%    sodium chloride 0.9%    traMADoL        Objective:     Vital Signs (Most Recent):  Temp: 97.2 °F (36.2 °C) (08/08/20 0757)  Pulse: 86 (08/08/20 0757)  Resp: 14 (08/08/20 1011)  BP: (!) 154/77 (08/08/20 0757)  SpO2: 97 % (08/08/20 0757) Vital Signs (24h Range):  Temp:  [97.1 °F (36.2 °C)-100.4 °F (38 °C)] 97.2 °F (36.2 °C)  Pulse:   [] 86  Resp:  [13-20] 14  SpO2:  [94 %-100 %] 97 %  BP: (146-177)/(70-85) 154/77     Intake/Output - Last 3 Shifts       08/06 0700 - 08/07 0659 08/07 0700 - 08/08 0659 08/08 0700 - 08/09 0659    P.O. 0 0     I.V. (mL/kg)       Blood  1027.5     IV Piggyback 850 1100      2178.2     Total Intake(mL/kg) 1760 (24.9) 4305.7 (60.9)     Urine (mL/kg/hr) 500 (0.3) 0 (0) 200 (0.7)    Emesis/NG output 100  0    Drains 1540 920     Other 70 0 0    Stool 75 175 0    Blood   0    Total Output 2285 1095 200    Net -525 +3210.7 -200           Urine Occurrence 5 x 4 x 1 x    Stool Occurrence 0 x 0 x 0 x    Emesis Occurrence   0 x          Physical Exam  Constitutional:       General: She is not in acute distress.  HENT:      Head: Normocephalic.   Eyes:      Conjunctiva/sclera: Conjunctivae normal.   Neck:      Trachea: No tracheal deviation.   NG tube in place with 600 cc brown output on placement    Cardiovascular:      Rate and Rhythm: Regular rhythm.   Pulmonary:      Effort: Pulmonary effort is normal. No respiratory distress.   Abdominal:      Comments:   Skin separation at inferior aspect of wound with serosang + purulence v fat necrosis able to be expressed through wound. Ostomy pink, small amt liquid brown stool and no gas in bag. Tympany.    Neurological:      Mental Status: She is alert and oriented to person, place, and time.         Significant Labs:  BMP (Last 3 Results):   Recent Labs   Lab 08/06/20  0300 08/07/20  0430 08/08/20  0420   * 212* 131*    134* 135*   K 4.7 4.1 4.1    100 101   CO2 28 28 26   BUN 12 10 14   CREATININE 0.8 0.8 0.8   CALCIUM 8.5* 8.3* 8.5*   MG 1.9 1.7 1.7     CBC (Last 3 Results):   Recent Labs   Lab 08/07/20  0430 08/07/20  2330 08/08/20  0420   WBC 9.54 17.08* 13.28*   RBC 2.46* 3.09* 3.03*   HGB 6.1* 7.9* 7.7*   HCT 19.5* 25.2* 24.6*   * 420* 386*   MCV 79* 82 81*   MCH 24.8* 25.6* 25.4*   MCHC 31.3* 31.3* 31.3*

## 2020-08-08 NOTE — PLAN OF CARE
Patient A/O x4. Patient c/o pain. On PCA pump. NGT in place draining to LIWS. Will dark green output. IR drain on the LLQ draining orangish thin liquid. Patient up to toilet x1 assist. Turn q2. Ostomy bag draining small amounts of stool. Midline incision dressing dry and intact. Call light placed in reach. Frequent rounding done.

## 2020-08-08 NOTE — NURSING
Pt reports NGT came out inadvertently. Sx on call notified, received new order to reinsert NGT. Will continue to manage POC.

## 2020-08-08 NOTE — CARE UPDATE
BG goal 140 - 180   BG reasonably controlled on current SQ insulin regimen.   Diet NPO Except for: Sips with Medication (can have sips of clear liquids), Ice Chips, Medication  TPN ADULT CENTRAL LINE CUSTOM  TPN ADULT CENTRAL LINE CUSTOM  8 Days Post-Op    - Novolog 14 units every 4 hours while on TPN.  - Moderate Dose SQ Insulin Correction Scale.  - BG monitoring every 4 hours while NPO and TPN (coordinate with meals during the day).     ** Please call Endocrine for any BG related issues **  ** Please notify Endocrine for any change and/or advance in diet**    Discharge Plans:   TBD. Please notify endocrinology prior to discharge.

## 2020-08-08 NOTE — PROGRESS NOTES
[facility]  Colorectal Surgery  Progress Note    Subjective:       Post-Op Info:  Procedure(s) (LRB):  RESECTION, COLON, LOW ANTERIOR (N/A)  CYSTOSCOPY (N/A)  CATHETERIZATION, URETER (Bilateral)  CREATION, ILEOSTOMY  LYSIS, ADHESIONS   8 Days Post-Op       Subjective:     Interval History:   No acute events. Complaining of abdominal pain to lower belly - but improved since yesterday. NG tube was replaced after falling out overnight, set to suction now. No nausea, not passing gas, OOB to bathroom.     Post-Op Info:  Procedure(s) (LRB):  RESECTION, COLON, LOW ANTERIOR (N/A)  CYSTOSCOPY (N/A)  CATHETERIZATION, URETER (Bilateral)  CREATION, ILEOSTOMY  LYSIS, ADHESIONS   8 Days Post-Op      Medications:  Continuous Infusions:   dextrose 10 % in water (D10W)      hydromorphone in 0.9 % NaCl 6 mg/30 ml      TPN ADULT CENTRAL LINE CUSTOM 75 mL/hr at 08/07/20 4943     Scheduled Meds:   enoxaparin  40 mg Subcutaneous Q24H    ertapenem (INVANZ) IVPB  1 g Intravenous Q24H    fat emulsion 20%  250 mL Intravenous Daily    ibuprofen  800 mg Intravenous Q6H    insulin aspart U-100  14 Units Subcutaneous Q24H    insulin aspart U-100  14 Units Subcutaneous Q24H    insulin aspart U-100  14 Units Subcutaneous Q24H    insulin aspart U-100  14 Units Subcutaneous Q24H    insulin aspart U-100  14 Units Subcutaneous Q24H    insulin aspart U-100  14 Units Subcutaneous Q24H    lidocaine  1 patch Transdermal Q24H    metoprolol  5 mg Intravenous Q6H    pantoprozole (PROTONIX) IV  40 mg Intravenous Q24H    sodium chloride 0.9%  10 mL Intravenous Q6H     PRN Meds:   sodium chloride    calcium carbonate    dextrose 10 % in water (D10W)    dextrose 50%    glucagon (human recombinant)    HYDROmorphone    HYDROmorphone    insulin aspart U-100    naloxone    ondansetron    ondansetron    promethazine (PHENERGAN) IVPB    sodium chloride 0.9%    sodium chloride 0.9%    traMADoL        Objective:      Vital Signs (Most Recent):  Temp: 97.2 °F (36.2 °C) (08/08/20 0757)  Pulse: 86 (08/08/20 0757)  Resp: 14 (08/08/20 1011)  BP: (!) 154/77 (08/08/20 0757)  SpO2: 97 % (08/08/20 0757) Vital Signs (24h Range):  Temp:  [97.1 °F (36.2 °C)-100.4 °F (38 °C)] 97.2 °F (36.2 °C)  Pulse:  [] 86  Resp:  [13-20] 14  SpO2:  [94 %-100 %] 97 %  BP: (146-177)/(70-85) 154/77     Intake/Output - Last 3 Shifts       08/06 0700 - 08/07 0659 08/07 0700 - 08/08 0659 08/08 0700 - 08/09 0659    P.O. 0 0     I.V. (mL/kg)       Blood  1027.5     IV Piggyback 850 1100      2178.2     Total Intake(mL/kg) 1760 (24.9) 4305.7 (60.9)     Urine (mL/kg/hr) 500 (0.3) 0 (0) 200 (0.7)    Emesis/NG output 100  0    Drains 1540 920     Other 70 0 0    Stool 75 175 0    Blood   0    Total Output 2285 1095 200    Net -525 +3210.7 -200           Urine Occurrence 5 x 4 x 1 x    Stool Occurrence 0 x 0 x 0 x    Emesis Occurrence   0 x          Physical Exam  Constitutional:       General: She is not in acute distress.  HENT:      Head: Normocephalic.   Eyes:      Conjunctiva/sclera: Conjunctivae normal.   Neck:      Trachea: No tracheal deviation.   NG tube in place with 600 cc brown output on placement    Cardiovascular:      Rate and Rhythm: Regular rhythm.   Pulmonary:      Effort: Pulmonary effort is normal. No respiratory distress.   Abdominal:      Comments:   Skin separation at inferior aspect of wound with serosang + purulence v fat necrosis able to be expressed through wound. Ostomy pink, small amt liquid brown stool and no gas in bag. Tympany.    Neurological:      Mental Status: She is alert and oriented to person, place, and time.         Significant Labs:  BMP (Last 3 Results):   Recent Labs   Lab 08/06/20  0300 08/07/20  0430 08/08/20  0420   * 212* 131*    134* 135*   K 4.7 4.1 4.1    100 101   CO2 28 28 26   BUN 12 10 14   CREATININE 0.8 0.8 0.8   CALCIUM 8.5* 8.3* 8.5*   MG 1.9 1.7 1.7     CBC (Last 3  Results):   Recent Labs   Lab 08/07/20  0430 08/07/20  2330 08/08/20  0420   WBC 9.54 17.08* 13.28*   RBC 2.46* 3.09* 3.03*   HGB 6.1* 7.9* 7.7*   HCT 19.5* 25.2* 24.6*   * 420* 386*   MCV 79* 82 81*   MCH 24.8* 25.6* 25.4*   MCHC 31.3* 31.3* 31.3*         Assessment/Plan:     * Cutaneous fistula     * Cutaneous fistula  70 y.o. female 7 Days Post-Op for Procedure(s) (LRB):  RESECTION, COLON, LOW ANTERIOR (N/A)  CYSTOSCOPY (N/A)  CATHETERIZATION, URETER (Bilateral)  CREATION, ILEOSTOMY  LYSIS, ADHESIONS     CRP down to 262.     WBC stable at 9k  NPO and NG tube.  Continue TPN + IL, add maintenance IVF for NG losses  IV pain meds  IS  OOB  PT/OT  PENNY send for Cr level  IV iron     Continue communication with use of         On total parenteral nutrition (TPN)  Reorder.     UTI (urinary tract infection)  Treat with Cipro x 5 days (first day 8/5/20) - growing e.coli     Acute blood loss anemia  Hg 6.1 this morning. Will discuss transfusion w staff.        Chronic kidney disease, stage 3  Monitor labs  Monitor I&O     Creatinine 0.8. adequate UOP.     Generalized abdominal pain  Continue IV pain meds        Essential hypertension  Monitor BP  Currently holding home carvedilol and HCTZ - had restarted it but now is NPO so added scheduled metoprolol 5 mg q6h injections.     Type 2 diabetes mellitus without complication, with long-term current use of insulin  SSI  On scheduled insulin ordered by Endo team.  Accu check Q6     Endocrinology consulted. Appreciate their recommendations.            Emilia Durham MD  General Surgery  [facility]

## 2020-08-08 NOTE — PLAN OF CARE
POC reviewed with patient, states understanding. AOx4. VS WDL; Pt ambulated to the bathroom with assistance, SOB from activity, 1L per NC applied. NPO. L NGT to LIWS 350 mL overnight, nausea managed per MAR. Pain managed per PCA. Incision WDL, dressing intact. IR drain to LLQ WDL. Will continue to manage POC.

## 2020-08-08 NOTE — RESPIRATORY THERAPY
Rapid Response Respiratory Therapy ETCO2 Check     Time of visit: 843    Code Status: Full Code   : 1950  Bed: 1022/1022 A:   MRN: [id number]    SITUATION     Evaluated patient for: ETCO2 Compliance     BACKGROUND     Patient has a past medical history of Chronic kidney disease, stage 3, Diabetes mellitus, Diabetes mellitus, type 2, Hypertension, and UTI (urinary tract infection).    ASSESSMENT     Is the ETCO2 monitor on? (Yes/No)  yes   Is the patient wearing a cannula? (Yes/No) yes  Are ETCO2 orders placed? (Yes/No) yes  Is the patient on a PCA pump? (Yes/No) yes  ETCO2 monitored: ETCO2 (mmHg): 23 mmHg  O2 Device/Concentration:   Pulse:  Respiratory rate:  Temperature: Temp: 97.2 °F (36.2 °C) BP: BP: (!) 154/77 SpO2:   Level of Consciousness: Level of Consciousness (AVPU): alert  All Lung Field Breath Sounds: All Lung Fields Breath Sounds: Anterior:, Lateral:, diminished  MAGED Breath Sounds: diminished  LLL Breath Sounds: diminished  RUL Breath Sounds: diminished  RML Breath Sounds: diminished  RLL Breath Sounds: diminished  Ambu at bedside: Ambu bag with the patient?: Yes, Adult Ambu    INTERVENTIONS/RECOMMENDATIONS         PHYSICIAN ESCALATION     Physician Escalation (Yes/No):      Care discussed with:   Discussed plan of care primary RT,      FOLLOW-UP     Please call back the Rapid Response RT, Agnes Taylor, RRT at x [phone] for any questions or concerns.

## 2020-08-08 NOTE — ASSESSMENT & PLAN NOTE
* Cutaneous fistula  70 y.o. female 7 Days Post-Op for Procedure(s) (LRB):  RESECTION, COLON, LOW ANTERIOR (N/A)  CYSTOSCOPY (N/A)  CATHETERIZATION, URETER (Bilateral)  CREATION, ILEOSTOMY  LYSIS, ADHESIONS     CRP down to 262.     WBC stable at 9k  NPO and NG tube.  Continue TPN + IL, add maintenance IVF for NG losses  IV pain meds  IS  OOB  PT/OT  PENNY send for Cr level  IV iron     Continue communication with use of         On total parenteral nutrition (TPN)  Reorder.     UTI (urinary tract infection)  Treat with Cipro x 5 days (first day 8/5/20) - growing e.coli     Acute blood loss anemia  Hg 6.1 this morning. Will discuss transfusion w staff.        Chronic kidney disease, stage 3  Monitor labs  Monitor I&O     Creatinine 0.8. adequate UOP.     Generalized abdominal pain  Continue IV pain meds        Essential hypertension  Monitor BP  Currently holding home carvedilol and HCTZ - had restarted it but now is NPO so added scheduled metoprolol 5 mg q6h injections.     Type 2 diabetes mellitus without complication, with long-term current use of insulin  SSI  On scheduled insulin ordered by Endo team.  Accu check Q6     Endocrinology consulted. Appreciate their recommendations.

## 2020-08-08 NOTE — PLAN OF CARE
POC is reviewed and understood by patient. VSS. Oriented x4. NG tube with great output of brown liquid drainage. NPO. Rolls well in bed. Receives Zofran PRN for pain. Received 2 units of blood today. Receives ABT. Pt placed on a Dilaudid PCA pump this evening due to uncontrolled pain. No signs of distress noted. Pt's midline dehisced a little at the bottom. MD on call notified. Call bell in reach. HOB elevated. Bed in lowest position.WCTM.

## 2020-08-09 LAB
ALBUMIN SERPL BCP-MCNC: 1.3 G/DL (ref 3.5–5.2)
ALP SERPL-CCNC: 231 U/L (ref 55–135)
ALT SERPL W/O P-5'-P-CCNC: 18 U/L (ref 10–44)
ANION GAP SERPL CALC-SCNC: 6 MMOL/L (ref 8–16)
AST SERPL-CCNC: 19 U/L (ref 10–40)
BASOPHILS # BLD AUTO: 0.02 K/UL (ref 0–0.2)
BASOPHILS NFR BLD: 0.2 % (ref 0–1.9)
BILIRUB SERPL-MCNC: 0.3 MG/DL (ref 0.1–1)
BUN SERPL-MCNC: 14 MG/DL (ref 8–23)
CALCIUM SERPL-MCNC: 8.4 MG/DL (ref 8.7–10.5)
CHLORIDE SERPL-SCNC: 100 MMOL/L (ref 95–110)
CO2 SERPL-SCNC: 28 MMOL/L (ref 23–29)
CREAT SERPL-MCNC: 0.8 MG/DL (ref 0.5–1.4)
CRP SERPL-MCNC: 279.9 MG/L (ref 0–8.2)
DIFFERENTIAL METHOD: ABNORMAL
EOSINOPHIL # BLD AUTO: 0.5 K/UL (ref 0–0.5)
EOSINOPHIL NFR BLD: 4.7 % (ref 0–8)
ERYTHROCYTE [DISTWIDTH] IN BLOOD BY AUTOMATED COUNT: 18.7 % (ref 11.5–14.5)
EST. GFR  (AFRICAN AMERICAN): >60 ML/MIN/1.73 M^2
EST. GFR  (NON AFRICAN AMERICAN): >60 ML/MIN/1.73 M^2
GLUCOSE SERPL-MCNC: 156 MG/DL (ref 70–110)
HCT VFR BLD AUTO: 23.6 % (ref 37–48.5)
HGB BLD-MCNC: 7.3 G/DL (ref 12–16)
IMM GRANULOCYTES # BLD AUTO: 0.13 K/UL (ref 0–0.04)
IMM GRANULOCYTES NFR BLD AUTO: 1.1 % (ref 0–0.5)
LYMPHOCYTES # BLD AUTO: 1.1 K/UL (ref 1–4.8)
LYMPHOCYTES NFR BLD: 9.2 % (ref 18–48)
MAGNESIUM SERPL-MCNC: 1.8 MG/DL (ref 1.6–2.6)
MCH RBC QN AUTO: 25.3 PG (ref 27–31)
MCHC RBC AUTO-ENTMCNC: 30.9 G/DL (ref 32–36)
MCV RBC AUTO: 82 FL (ref 82–98)
MONOCYTES # BLD AUTO: 0.7 K/UL (ref 0.3–1)
MONOCYTES NFR BLD: 6.4 % (ref 4–15)
NEUTROPHILS # BLD AUTO: 9 K/UL (ref 1.8–7.7)
NEUTROPHILS NFR BLD: 78.4 % (ref 38–73)
NRBC BLD-RTO: 0 /100 WBC
PHOSPHATE SERPL-MCNC: 4.6 MG/DL (ref 2.7–4.5)
PLATELET # BLD AUTO: 422 K/UL (ref 150–350)
PMV BLD AUTO: 8.4 FL (ref 9.2–12.9)
POCT GLUCOSE: 112 MG/DL (ref 70–110)
POCT GLUCOSE: 127 MG/DL (ref 70–110)
POCT GLUCOSE: 150 MG/DL (ref 70–110)
POCT GLUCOSE: 177 MG/DL (ref 70–110)
POCT GLUCOSE: 190 MG/DL (ref 70–110)
POCT GLUCOSE: 193 MG/DL (ref 70–110)
POTASSIUM SERPL-SCNC: 4.4 MMOL/L (ref 3.5–5.1)
PROT SERPL-MCNC: 6 G/DL (ref 6–8.4)
RBC # BLD AUTO: 2.88 M/UL (ref 4–5.4)
SODIUM SERPL-SCNC: 134 MMOL/L (ref 136–145)
WBC # BLD AUTO: 11.5 K/UL (ref 3.9–12.7)

## 2020-08-09 PROCEDURE — 27000221 HC OXYGEN, UP TO 24 HOURS

## 2020-08-09 PROCEDURE — 63600175 PHARM REV CODE 636 W HCPCS: Performed by: STUDENT IN AN ORGANIZED HEALTH CARE EDUCATION/TRAINING PROGRAM

## 2020-08-09 PROCEDURE — A4216 STERILE WATER/SALINE, 10 ML: HCPCS | Performed by: STUDENT IN AN ORGANIZED HEALTH CARE EDUCATION/TRAINING PROGRAM

## 2020-08-09 PROCEDURE — 94770 HC EXHALED C02 TEST: CPT

## 2020-08-09 PROCEDURE — 84100 ASSAY OF PHOSPHORUS: CPT

## 2020-08-09 PROCEDURE — 25000003 PHARM REV CODE 250: Performed by: STUDENT IN AN ORGANIZED HEALTH CARE EDUCATION/TRAINING PROGRAM

## 2020-08-09 PROCEDURE — 80053 COMPREHEN METABOLIC PANEL: CPT

## 2020-08-09 PROCEDURE — A4217 STERILE WATER/SALINE, 500 ML: HCPCS | Performed by: STUDENT IN AN ORGANIZED HEALTH CARE EDUCATION/TRAINING PROGRAM

## 2020-08-09 PROCEDURE — 86140 C-REACTIVE PROTEIN: CPT

## 2020-08-09 PROCEDURE — C9113 INJ PANTOPRAZOLE SODIUM, VIA: HCPCS | Performed by: STUDENT IN AN ORGANIZED HEALTH CARE EDUCATION/TRAINING PROGRAM

## 2020-08-09 PROCEDURE — 94799 UNLISTED PULMONARY SVC/PX: CPT

## 2020-08-09 PROCEDURE — 20600001 HC STEP DOWN PRIVATE ROOM

## 2020-08-09 PROCEDURE — 85025 COMPLETE CBC W/AUTO DIFF WBC: CPT

## 2020-08-09 PROCEDURE — B4185 PARENTERAL SOL 10 GM LIPIDS: HCPCS | Performed by: STUDENT IN AN ORGANIZED HEALTH CARE EDUCATION/TRAINING PROGRAM

## 2020-08-09 PROCEDURE — 99900035 HC TECH TIME PER 15 MIN (STAT)

## 2020-08-09 PROCEDURE — 83735 ASSAY OF MAGNESIUM: CPT

## 2020-08-09 PROCEDURE — 94761 N-INVAS EAR/PLS OXIMETRY MLT: CPT

## 2020-08-09 RX ADMIN — IBUPROFEN 800 MG: 800 INJECTION INTRAVENOUS at 05:08

## 2020-08-09 RX ADMIN — ONDANSETRON 8 MG: 2 INJECTION INTRAMUSCULAR; INTRAVENOUS at 08:08

## 2020-08-09 RX ADMIN — Medication 10 ML: at 05:08

## 2020-08-09 RX ADMIN — MAGNESIUM SULFATE HEPTAHYDRATE: 500 INJECTION, SOLUTION INTRAMUSCULAR; INTRAVENOUS at 10:08

## 2020-08-09 RX ADMIN — PROMETHAZINE HYDROCHLORIDE 6.25 MG: 25 INJECTION INTRAMUSCULAR; INTRAVENOUS at 12:08

## 2020-08-09 RX ADMIN — METOPROLOL TARTRATE 5 MG: 5 INJECTION, SOLUTION INTRAVENOUS at 12:08

## 2020-08-09 RX ADMIN — INSULIN ASPART 14 UNITS: 100 INJECTION, SOLUTION INTRAVENOUS; SUBCUTANEOUS at 12:08

## 2020-08-09 RX ADMIN — INSULIN ASPART 14 UNITS: 100 INJECTION, SOLUTION INTRAVENOUS; SUBCUTANEOUS at 04:08

## 2020-08-09 RX ADMIN — METOPROLOL TARTRATE 5 MG: 5 INJECTION, SOLUTION INTRAVENOUS at 05:08

## 2020-08-09 RX ADMIN — ERTAPENEM 1 G: 1 INJECTION INTRAMUSCULAR; INTRAVENOUS at 12:08

## 2020-08-09 RX ADMIN — I.V. FAT EMULSION 250 ML: 20 EMULSION INTRAVENOUS at 10:08

## 2020-08-09 RX ADMIN — ONDANSETRON 8 MG: 2 INJECTION INTRAMUSCULAR; INTRAVENOUS at 04:08

## 2020-08-09 RX ADMIN — Medication: at 07:08

## 2020-08-09 RX ADMIN — IBUPROFEN 800 MG: 800 INJECTION INTRAVENOUS at 12:08

## 2020-08-09 RX ADMIN — INSULIN ASPART 14 UNITS: 100 INJECTION, SOLUTION INTRAVENOUS; SUBCUTANEOUS at 08:08

## 2020-08-09 RX ADMIN — Medication 10 ML: at 12:08

## 2020-08-09 RX ADMIN — ENOXAPARIN SODIUM 40 MG: 40 INJECTION SUBCUTANEOUS at 05:08

## 2020-08-09 RX ADMIN — ONDANSETRON 8 MG: 2 INJECTION INTRAMUSCULAR; INTRAVENOUS at 12:08

## 2020-08-09 RX ADMIN — INSULIN ASPART 2 UNITS: 100 INJECTION, SOLUTION INTRAVENOUS; SUBCUTANEOUS at 08:08

## 2020-08-09 RX ADMIN — PROMETHAZINE HYDROCHLORIDE 6.25 MG: 25 INJECTION INTRAMUSCULAR; INTRAVENOUS at 06:08

## 2020-08-09 RX ADMIN — PANTOPRAZOLE SODIUM 40 MG: 40 INJECTION, POWDER, LYOPHILIZED, FOR SOLUTION INTRAVENOUS at 08:08

## 2020-08-09 RX ADMIN — LIDOCAINE 1 PATCH: 50 PATCH TOPICAL at 05:08

## 2020-08-09 RX ADMIN — Medication 10 ML: at 06:08

## 2020-08-09 RX ADMIN — IBUPROFEN 800 MG: 800 INJECTION INTRAVENOUS at 01:08

## 2020-08-09 NOTE — PLAN OF CARE
Patient A/O x4. Patient c/o minor pain. On a pca pump. Patient up in chair today. Ambulating to the bathroom to void. IR drain draining serosanguinous output. TPN at 75cc. Patient c/o of little nausea. Anti-emetics given. NPO. Call light placed in reach. Frequent rounding done.

## 2020-08-09 NOTE — CARE UPDATE
BG goal 140 - 180     BG remains reasonably controlled on current SQ insulin regimen. Patient remains on TPN therapy.   Diet NPO Except for: Sips with Medication (can have sips of clear liquids), Ice Chips, Medication  TPN ADULT CENTRAL LINE CUSTOM  TPN ADULT CENTRAL LINE CUSTOM  9 Days Post-Op    - Novolog 14 units every 4 hours while on TPN.  - Moderate Dose SQ Insulin Correction Scale.  - BG monitoring every 4 hours while NPO and TPN (coordinate with meals during the day).      ** Please call Endocrine for any BG related issues **  ** Please notify Endocrine for any change and/or advance in diet**     Discharge Plans:   TBD. Please notify endocrinology prior to discharge.

## 2020-08-09 NOTE — PLAN OF CARE
Plan of care reviewed with patient questions and concerns addressed verbalized understanding.  AAOx4. Telemetry= NSR 80-90s. Glucose stable. PCA pump in use. NGT to LIWS, draining greenish drainage. Abdominal midline incision with staples EFRAIN. Left side dressing dry, intact. Ostomy with small amount of liquid brown stool. TPN at 75ml/hr. Left IR drain with small amount of orange drainage. Safety and fall precautions maintained.  Physically Mobilized to their highest level of functioning.  See flow sheet for further information.  Will continue to monitor.

## 2020-08-09 NOTE — PROGRESS NOTES
[facility]  Colorectal Surgery  Progress Note    Subjective:     Post-Op Info:  Procedure(s) (LRB):  RESECTION, COLON, LOW ANTERIOR (N/A)  CYSTOSCOPY (N/A)  CATHETERIZATION, URETER (Bilateral)  CREATION, ILEOSTOMY  LYSIS, ADHESIONS   9 Days Post-Op       Subjective:     Interval History:   No acute events. Had a better night last night. Complaining of abdominal pain to lower belly - but improved since yesterday. No nausea, not passing gas, OOB to bathroom. Repacked wound this morning.     Post-Op Info:  Procedure(s) (LRB):  RESECTION, COLON, LOW ANTERIOR (N/A)  CYSTOSCOPY (N/A)  CATHETERIZATION, URETER (Bilateral)  CREATION, ILEOSTOMY  LYSIS, ADHESIONS   9 Days Post-Op      Medications:  Continuous Infusions:   dextrose 10 % in water (D10W)      hydromorphone in 0.9 % NaCl 6 mg/30 ml      TPN ADULT CENTRAL LINE CUSTOM 75 mL/hr at 08/08/20 2301     Scheduled Meds:   enoxaparin  40 mg Subcutaneous Q24H    ertapenem (INVANZ) IVPB  1 g Intravenous Q24H    fat emulsion 20%  250 mL Intravenous Daily    ibuprofen  800 mg Intravenous Q6H    insulin aspart U-100  14 Units Subcutaneous Q24H    insulin aspart U-100  14 Units Subcutaneous Q24H    insulin aspart U-100  14 Units Subcutaneous Q24H    insulin aspart U-100  14 Units Subcutaneous Q24H    insulin aspart U-100  14 Units Subcutaneous Q24H    insulin aspart U-100  14 Units Subcutaneous Q24H    lidocaine  1 patch Transdermal Q24H    metoprolol  5 mg Intravenous Q6H    pantoprozole (PROTONIX) IV  40 mg Intravenous Q24H    sodium chloride 0.9%  10 mL Intravenous Q6H     PRN Meds:   sodium chloride    calcium carbonate    dextrose 10 % in water (D10W)    dextrose 50%    glucagon (human recombinant)    HYDROmorphone    HYDROmorphone    insulin aspart U-100    naloxone    ondansetron    ondansetron    promethazine (PHENERGAN) IVPB    sodium chloride 0.9%    sodium chloride 0.9%    traMADoL        Objective:     Vital Signs  (Most Recent):  Temp: 98.3 °F (36.8 °C) (08/09/20 0736)  Pulse: 83 (08/09/20 0736)  Resp: 16 (08/09/20 0739)  BP: (!) 150/76 (08/09/20 0736)  SpO2: 97 % (08/09/20 0736) Vital Signs (24h Range):  Temp:  [98 °F (36.7 °C)-98.7 °F (37.1 °C)] 98.3 °F (36.8 °C)  Pulse:  [] 83  Resp:  [13-20] 16  SpO2:  [95 %-97 %] 97 %  BP: (124-177)/(71-90) 150/76     Intake/Output - Last 3 Shifts       08/07 0700 - 08/08 0659 08/08 0700 - 08/09 0659 08/09 0700 - 08/10 0659    P.O. 0 0     I.V. (mL/kg)  44 (0.6)     Blood 1027.5      IV Piggyback 1100 550     TPN 2178.2 2363.3     Total Intake(mL/kg) 4305.7 (60.9) 2957.3 (41.8)     Urine (mL/kg/hr) 0 (0) 200 (0.1)     Emesis/NG output  0     Drains 920 430     Other 0 0     Stool 175 50     Blood  0     Total Output 1095 680     Net +3210.7 +2277.3            Urine Occurrence 4 x 2 x     Stool Occurrence 0 x 0 x     Emesis Occurrence  0 x           Physical Exam  Constitutional:       General: She is not in acute distress.  HENT:      Head: Normocephalic.   Eyes:      Conjunctiva/sclera: Conjunctivae normal.   Neck:      Trachea: No tracheal deviation.   NG tube in place with 600 cc brown output on placement    Cardiovascular:      Rate and Rhythm: Regular rhythm.   Pulmonary:      Effort: Pulmonary effort is normal. No respiratory distress.   Abdominal:      Comments:   Skin separation at inferior aspect of wound with serosang + some purulence.Ostomy pink, small amt liquid brown stool and no gas in bag. Tympany.    Neurological:      Mental Status: She is alert and oriented to person, place, and time.         Significant Labs:  BMP (Last 3 Results):   Recent Labs   Lab 08/07/20  0430 08/08/20  0420 08/09/20  0423   * 131* 156*   * 135* 134*   K 4.1 4.1 4.4    101 100   CO2 28 26 28   BUN 10 14 14   CREATININE 0.8 0.8 0.8   CALCIUM 8.3* 8.5* 8.4*   MG 1.7 1.7 1.8     CBC (Last 3 Results):   Recent Labs   Lab 08/07/20  2330 08/08/20  0420 08/09/20  0423   WBC  17.08* 13.28* 11.50   RBC 3.09* 3.03* 2.88*   HGB 7.9* 7.7* 7.3*   HCT 25.2* 24.6* 23.6*   * 386* 422*   MCV 82 81* 82   MCH 25.6* 25.4* 25.3*   MCHC 31.3* 31.3* 30.9*     Assessment/Plan:     * Cutaneous fistula     * Cutaneous fistula  70 y.o. female 7 Days Post-Op for Procedure(s) (LRB):  RESECTION, COLON, LOW ANTERIOR (N/A)  CYSTOSCOPY (N/A)  CATHETERIZATION, URETER (Bilateral)  CREATION, ILEOSTOMY  LYSIS, ADHESIONS     CRP down to 262.     WBC stable at 9k  NPO and NG tube.  Continue TPN + IL, add maintenance IVF for NG losses  IV pain meds  IS  OOB  PT/OT     Continue communication with use of         On total parenteral nutrition (TPN)  Reorder.     UTI (urinary tract infection)  Treat with Cipro x 5 days (first day 8/5/20) - growing e.coli     Acute blood loss anemia  Hg 6.1 this morning. Will discuss transfusion w staff.        Chronic kidney disease, stage 3  Monitor labs  Monitor I&O     Creatinine 0.8. adequate UOP.     Generalized abdominal pain  Continue IV pain meds        Essential hypertension  Monitor BP  Currently holding home carvedilol and HCTZ - had restarted it but now is NPO so added scheduled metoprolol 5 mg q6h injections.     Type 2 diabetes mellitus without complication, with long-term current use of insulin  SSI  On scheduled insulin ordered by Endo team.  Accu check Q6     Endocrinology consulted. Appreciate their recommendations.            Emilia Durham MD  General Surgery  [facility]

## 2020-08-09 NOTE — SUBJECTIVE & OBJECTIVE
Subjective:     Interval History:   No acute events. Had a better night last night. Complaining of abdominal pain to lower belly - but improved since yesterday. No nausea, not passing gas, OOB to bathroom. Repacked wound this morning.     Post-Op Info:  Procedure(s) (LRB):  RESECTION, COLON, LOW ANTERIOR (N/A)  CYSTOSCOPY (N/A)  CATHETERIZATION, URETER (Bilateral)  CREATION, ILEOSTOMY  LYSIS, ADHESIONS   9 Days Post-Op      Medications:  Continuous Infusions:   dextrose 10 % in water (D10W)      hydromorphone in 0.9 % NaCl 6 mg/30 ml      TPN ADULT CENTRAL LINE CUSTOM 75 mL/hr at 08/08/20 2301     Scheduled Meds:   enoxaparin  40 mg Subcutaneous Q24H    ertapenem (INVANZ) IVPB  1 g Intravenous Q24H    fat emulsion 20%  250 mL Intravenous Daily    ibuprofen  800 mg Intravenous Q6H    insulin aspart U-100  14 Units Subcutaneous Q24H    insulin aspart U-100  14 Units Subcutaneous Q24H    insulin aspart U-100  14 Units Subcutaneous Q24H    insulin aspart U-100  14 Units Subcutaneous Q24H    insulin aspart U-100  14 Units Subcutaneous Q24H    insulin aspart U-100  14 Units Subcutaneous Q24H    lidocaine  1 patch Transdermal Q24H    metoprolol  5 mg Intravenous Q6H    pantoprozole (PROTONIX) IV  40 mg Intravenous Q24H    sodium chloride 0.9%  10 mL Intravenous Q6H     PRN Meds:   sodium chloride    calcium carbonate    dextrose 10 % in water (D10W)    dextrose 50%    glucagon (human recombinant)    HYDROmorphone    HYDROmorphone    insulin aspart U-100    naloxone    ondansetron    ondansetron    promethazine (PHENERGAN) IVPB    sodium chloride 0.9%    sodium chloride 0.9%    traMADoL        Objective:     Vital Signs (Most Recent):  Temp: 98.3 °F (36.8 °C) (08/09/20 0736)  Pulse: 83 (08/09/20 0736)  Resp: 16 (08/09/20 0739)  BP: (!) 150/76 (08/09/20 0736)  SpO2: 97 % (08/09/20 0736) Vital Signs (24h Range):  Temp:  [98 °F (36.7 °C)-98.7 °F (37.1 °C)] 98.3 °F (36.8 °C)  Pulse:  []  83  Resp:  [13-20] 16  SpO2:  [95 %-97 %] 97 %  BP: (124-177)/(71-90) 150/76     Intake/Output - Last 3 Shifts       08/07 0700 - 08/08 0659 08/08 0700 - 08/09 0659 08/09 0700 - 08/10 0659    P.O. 0 0     I.V. (mL/kg)  44 (0.6)     Blood 1027.5      IV Piggyback 1100 550     TPN 2178.2 2363.3     Total Intake(mL/kg) 4305.7 (60.9) 2957.3 (41.8)     Urine (mL/kg/hr) 0 (0) 200 (0.1)     Emesis/NG output  0     Drains 920 430     Other 0 0     Stool 175 50     Blood  0     Total Output 1095 680     Net +3210.7 +2277.3            Urine Occurrence 4 x 2 x     Stool Occurrence 0 x 0 x     Emesis Occurrence  0 x           Physical Exam  Constitutional:       General: She is not in acute distress.  HENT:      Head: Normocephalic.   Eyes:      Conjunctiva/sclera: Conjunctivae normal.   Neck:      Trachea: No tracheal deviation.   NG tube in place with 600 cc brown output on placement    Cardiovascular:      Rate and Rhythm: Regular rhythm.   Pulmonary:      Effort: Pulmonary effort is normal. No respiratory distress.   Abdominal:      Comments:   Skin separation at inferior aspect of wound with serosang + some purulence.Ostomy pink, small amt liquid brown stool and no gas in bag. Tympany.    Neurological:      Mental Status: She is alert and oriented to person, place, and time.         Significant Labs:  BMP (Last 3 Results):   Recent Labs   Lab 08/07/20  0430 08/08/20  0420 08/09/20  0423   * 131* 156*   * 135* 134*   K 4.1 4.1 4.4    101 100   CO2 28 26 28   BUN 10 14 14   CREATININE 0.8 0.8 0.8   CALCIUM 8.3* 8.5* 8.4*   MG 1.7 1.7 1.8     CBC (Last 3 Results):   Recent Labs   Lab 08/07/20  2330 08/08/20  0420 08/09/20  0423   WBC 17.08* 13.28* 11.50   RBC 3.09* 3.03* 2.88*   HGB 7.9* 7.7* 7.3*   HCT 25.2* 24.6* 23.6*   * 386* 422*   MCV 82 81* 82   MCH 25.6* 25.4* 25.3*   MCHC 31.3* 31.3* 30.9*

## 2020-08-09 NOTE — ASSESSMENT & PLAN NOTE
* Cutaneous fistula  70 y.o. female 7 Days Post-Op for Procedure(s) (LRB):  RESECTION, COLON, LOW ANTERIOR (N/A)  CYSTOSCOPY (N/A)  CATHETERIZATION, URETER (Bilateral)  CREATION, ILEOSTOMY  LYSIS, ADHESIONS     CRP down to 262.     WBC stable at 9k  NPO and NG tube.  Continue TPN + IL, add maintenance IVF for NG losses  IV pain meds  IS  OOB  PT/OT     Continue communication with use of         On total parenteral nutrition (TPN)  Reorder.     UTI (urinary tract infection)  Treat with Cipro x 5 days (first day 8/5/20) - growing e.coli     Acute blood loss anemia  Hg 6.1 this morning. Will discuss transfusion w staff.        Chronic kidney disease, stage 3  Monitor labs  Monitor I&O     Creatinine 0.8. adequate UOP.     Generalized abdominal pain  Continue IV pain meds        Essential hypertension  Monitor BP  Currently holding home carvedilol and HCTZ - had restarted it but now is NPO so added scheduled metoprolol 5 mg q6h injections.     Type 2 diabetes mellitus without complication, with long-term current use of insulin  SSI  On scheduled insulin ordered by Endo team.  Accu check Q6     Endocrinology consulted. Appreciate their recommendations.

## 2020-08-10 LAB
ALBUMIN SERPL BCP-MCNC: 1.3 G/DL (ref 3.5–5.2)
ALP SERPL-CCNC: 320 U/L (ref 55–135)
ALT SERPL W/O P-5'-P-CCNC: 18 U/L (ref 10–44)
ANION GAP SERPL CALC-SCNC: 7 MMOL/L (ref 8–16)
AST SERPL-CCNC: 18 U/L (ref 10–40)
BACTERIA BLD CULT: NORMAL
BACTERIA SPEC ANAEROBE CULT: NORMAL
BASOPHILS # BLD AUTO: 0.03 K/UL (ref 0–0.2)
BASOPHILS NFR BLD: 0.2 % (ref 0–1.9)
BILIRUB SERPL-MCNC: 0.3 MG/DL (ref 0.1–1)
BUN SERPL-MCNC: 15 MG/DL (ref 8–23)
CALCIUM SERPL-MCNC: 8.6 MG/DL (ref 8.7–10.5)
CHLORIDE SERPL-SCNC: 98 MMOL/L (ref 95–110)
CO2 SERPL-SCNC: 28 MMOL/L (ref 23–29)
CREAT SERPL-MCNC: 0.8 MG/DL (ref 0.5–1.4)
CRP SERPL-MCNC: 279.2 MG/L (ref 0–8.2)
DIFFERENTIAL METHOD: ABNORMAL
EOSINOPHIL # BLD AUTO: 0.3 K/UL (ref 0–0.5)
EOSINOPHIL NFR BLD: 2.3 % (ref 0–8)
ERYTHROCYTE [DISTWIDTH] IN BLOOD BY AUTOMATED COUNT: 18.5 % (ref 11.5–14.5)
EST. GFR  (AFRICAN AMERICAN): >60 ML/MIN/1.73 M^2
EST. GFR  (NON AFRICAN AMERICAN): >60 ML/MIN/1.73 M^2
FINAL PATHOLOGIC DIAGNOSIS: NORMAL
GLUCOSE SERPL-MCNC: 199 MG/DL (ref 70–110)
GROSS: NORMAL
HCT VFR BLD AUTO: 23.5 % (ref 37–48.5)
HGB BLD-MCNC: 7.2 G/DL (ref 12–16)
IMM GRANULOCYTES # BLD AUTO: 0.15 K/UL (ref 0–0.04)
IMM GRANULOCYTES NFR BLD AUTO: 1.2 % (ref 0–0.5)
LYMPHOCYTES # BLD AUTO: 1.1 K/UL (ref 1–4.8)
LYMPHOCYTES NFR BLD: 8.8 % (ref 18–48)
MAGNESIUM SERPL-MCNC: 1.9 MG/DL (ref 1.6–2.6)
MCH RBC QN AUTO: 25.4 PG (ref 27–31)
MCHC RBC AUTO-ENTMCNC: 30.6 G/DL (ref 32–36)
MCV RBC AUTO: 83 FL (ref 82–98)
MONOCYTES # BLD AUTO: 0.8 K/UL (ref 0.3–1)
MONOCYTES NFR BLD: 5.8 % (ref 4–15)
NEUTROPHILS # BLD AUTO: 10.6 K/UL (ref 1.8–7.7)
NEUTROPHILS NFR BLD: 81.7 % (ref 38–73)
NRBC BLD-RTO: 0 /100 WBC
PHOSPHATE SERPL-MCNC: 5.1 MG/DL (ref 2.7–4.5)
PLATELET # BLD AUTO: 517 K/UL (ref 150–350)
PMV BLD AUTO: 8.7 FL (ref 9.2–12.9)
POCT GLUCOSE: 137 MG/DL (ref 70–110)
POCT GLUCOSE: 146 MG/DL (ref 70–110)
POCT GLUCOSE: 183 MG/DL (ref 70–110)
POCT GLUCOSE: 219 MG/DL (ref 70–110)
POCT GLUCOSE: 87 MG/DL (ref 70–110)
POTASSIUM SERPL-SCNC: 4.7 MMOL/L (ref 3.5–5.1)
PROT SERPL-MCNC: 6.2 G/DL (ref 6–8.4)
RBC # BLD AUTO: 2.84 M/UL (ref 4–5.4)
SODIUM SERPL-SCNC: 133 MMOL/L (ref 136–145)
TRIGL SERPL-MCNC: 192 MG/DL (ref 30–150)
WBC # BLD AUTO: 13 K/UL (ref 3.9–12.7)

## 2020-08-10 PROCEDURE — 94770 HC EXHALED C02 TEST: CPT

## 2020-08-10 PROCEDURE — 63600175 PHARM REV CODE 636 W HCPCS: Performed by: COLON & RECTAL SURGERY

## 2020-08-10 PROCEDURE — 85025 COMPLETE CBC W/AUTO DIFF WBC: CPT

## 2020-08-10 PROCEDURE — 25000003 PHARM REV CODE 250: Performed by: STUDENT IN AN ORGANIZED HEALTH CARE EDUCATION/TRAINING PROGRAM

## 2020-08-10 PROCEDURE — 63600175 PHARM REV CODE 636 W HCPCS: Performed by: STUDENT IN AN ORGANIZED HEALTH CARE EDUCATION/TRAINING PROGRAM

## 2020-08-10 PROCEDURE — 99232 PR SUBSEQUENT HOSPITAL CARE,LEVL II: ICD-10-PCS | Mod: ,,, | Performed by: NURSE PRACTITIONER

## 2020-08-10 PROCEDURE — 99900035 HC TECH TIME PER 15 MIN (STAT)

## 2020-08-10 PROCEDURE — A4217 STERILE WATER/SALINE, 500 ML: HCPCS | Performed by: COLON & RECTAL SURGERY

## 2020-08-10 PROCEDURE — 80053 COMPREHEN METABOLIC PANEL: CPT

## 2020-08-10 PROCEDURE — 99232 SBSQ HOSP IP/OBS MODERATE 35: CPT | Mod: ,,, | Performed by: NURSE PRACTITIONER

## 2020-08-10 PROCEDURE — 94761 N-INVAS EAR/PLS OXIMETRY MLT: CPT

## 2020-08-10 PROCEDURE — A4216 STERILE WATER/SALINE, 10 ML: HCPCS | Performed by: STUDENT IN AN ORGANIZED HEALTH CARE EDUCATION/TRAINING PROGRAM

## 2020-08-10 PROCEDURE — 84100 ASSAY OF PHOSPHORUS: CPT

## 2020-08-10 PROCEDURE — B4185 PARENTERAL SOL 10 GM LIPIDS: HCPCS | Performed by: COLON & RECTAL SURGERY

## 2020-08-10 PROCEDURE — 83735 ASSAY OF MAGNESIUM: CPT

## 2020-08-10 PROCEDURE — 84478 ASSAY OF TRIGLYCERIDES: CPT

## 2020-08-10 PROCEDURE — 20600001 HC STEP DOWN PRIVATE ROOM

## 2020-08-10 PROCEDURE — 25000003 PHARM REV CODE 250: Performed by: COLON & RECTAL SURGERY

## 2020-08-10 PROCEDURE — 97530 THERAPEUTIC ACTIVITIES: CPT

## 2020-08-10 PROCEDURE — C9113 INJ PANTOPRAZOLE SODIUM, VIA: HCPCS | Performed by: STUDENT IN AN ORGANIZED HEALTH CARE EDUCATION/TRAINING PROGRAM

## 2020-08-10 PROCEDURE — 27000221 HC OXYGEN, UP TO 24 HOURS

## 2020-08-10 PROCEDURE — 86140 C-REACTIVE PROTEIN: CPT

## 2020-08-10 RX ORDER — SCOLOPAMINE TRANSDERMAL SYSTEM 1 MG/1
1 PATCH, EXTENDED RELEASE TRANSDERMAL
Status: DISCONTINUED | OUTPATIENT
Start: 2020-08-10 | End: 2020-08-21 | Stop reason: HOSPADM

## 2020-08-10 RX ADMIN — Medication 10 ML: at 12:08

## 2020-08-10 RX ADMIN — INSULIN ASPART 14 UNITS: 100 INJECTION, SOLUTION INTRAVENOUS; SUBCUTANEOUS at 09:08

## 2020-08-10 RX ADMIN — INSULIN ASPART 14 UNITS: 100 INJECTION, SOLUTION INTRAVENOUS; SUBCUTANEOUS at 08:08

## 2020-08-10 RX ADMIN — LIDOCAINE 1 PATCH: 50 PATCH TOPICAL at 06:08

## 2020-08-10 RX ADMIN — I.V. FAT EMULSION 250 ML: 20 EMULSION INTRAVENOUS at 10:08

## 2020-08-10 RX ADMIN — PANTOPRAZOLE SODIUM 40 MG: 40 INJECTION, POWDER, LYOPHILIZED, FOR SOLUTION INTRAVENOUS at 08:08

## 2020-08-10 RX ADMIN — Medication 10 ML: at 06:08

## 2020-08-10 RX ADMIN — IBUPROFEN 800 MG: 800 INJECTION INTRAVENOUS at 12:08

## 2020-08-10 RX ADMIN — METOPROLOL TARTRATE 5 MG: 5 INJECTION, SOLUTION INTRAVENOUS at 12:08

## 2020-08-10 RX ADMIN — MAGNESIUM SULFATE HEPTAHYDRATE: 500 INJECTION, SOLUTION INTRAMUSCULAR; INTRAVENOUS at 10:08

## 2020-08-10 RX ADMIN — METOPROLOL TARTRATE 5 MG: 5 INJECTION, SOLUTION INTRAVENOUS at 05:08

## 2020-08-10 RX ADMIN — ERTAPENEM 1 G: 1 INJECTION INTRAMUSCULAR; INTRAVENOUS at 12:08

## 2020-08-10 RX ADMIN — SCOPALAMINE 1 PATCH: 1 PATCH, EXTENDED RELEASE TRANSDERMAL at 08:08

## 2020-08-10 RX ADMIN — IBUPROFEN 800 MG: 800 INJECTION INTRAVENOUS at 05:08

## 2020-08-10 RX ADMIN — PROMETHAZINE HYDROCHLORIDE 6.25 MG: 25 INJECTION INTRAMUSCULAR; INTRAVENOUS at 06:08

## 2020-08-10 RX ADMIN — Medication: at 05:08

## 2020-08-10 RX ADMIN — Medication: at 06:08

## 2020-08-10 RX ADMIN — IBUPROFEN 800 MG: 800 INJECTION INTRAVENOUS at 11:08

## 2020-08-10 RX ADMIN — METOPROLOL TARTRATE 5 MG: 5 INJECTION, SOLUTION INTRAVENOUS at 06:08

## 2020-08-10 RX ADMIN — ONDANSETRON 8 MG: 2 INJECTION INTRAMUSCULAR; INTRAVENOUS at 02:08

## 2020-08-10 RX ADMIN — IBUPROFEN 800 MG: 800 INJECTION INTRAVENOUS at 06:08

## 2020-08-10 RX ADMIN — INSULIN ASPART 14 UNITS: 100 INJECTION, SOLUTION INTRAVENOUS; SUBCUTANEOUS at 05:08

## 2020-08-10 RX ADMIN — INSULIN ASPART 14 UNITS: 100 INJECTION, SOLUTION INTRAVENOUS; SUBCUTANEOUS at 12:08

## 2020-08-10 RX ADMIN — METOPROLOL TARTRATE 5 MG: 5 INJECTION, SOLUTION INTRAVENOUS at 01:08

## 2020-08-10 RX ADMIN — INSULIN ASPART 14 UNITS: 100 INJECTION, SOLUTION INTRAVENOUS; SUBCUTANEOUS at 04:08

## 2020-08-10 RX ADMIN — INSULIN ASPART 2 UNITS: 100 INJECTION, SOLUTION INTRAVENOUS; SUBCUTANEOUS at 04:08

## 2020-08-10 RX ADMIN — ENOXAPARIN SODIUM 40 MG: 40 INJECTION SUBCUTANEOUS at 06:08

## 2020-08-10 NOTE — PLAN OF CARE
Problem: Occupational Therapy Goal  Goal: Occupational Therapy Goal  Description: Goals to be met by: 8/11/20     Patient will increase functional independence with ADLs by performing:    Feeding with [place].  UE Dressing with [place].  LE Dressing with Stand-by Assistance.  Grooming while standing at sink with Supervision.  Toileting from toilet with Supervision for hygiene and clothing management.   Bathing from  standing at sink with Supervision.  Toilet transfer to toilet with Supervision.    Outcome: Ongoing, Progressing     Goals reviewed and remain appropriate, continue POC    WELLINGTON Godinez  8/10/2020   Pager #: [phone]

## 2020-08-10 NOTE — PHYSICIAN QUERY
PT Name: Azucena Morrison  MR #: [id number]     Documentation Clarification      CDS/: Carmen Gusman RN, CDS               Contact information: [email or web address]    This form is a permanent document in the medical record.     Query Date: August 10, 2020    By submitting this query, we are merely seeking further clarification of documentation. Please utilize your independent clinical judgment when addressing the question(s) below.    The Medical Record reflects the following:    Supporting Clinical Findings Location in Medical Record   71yo F w/ complex surgical history (as below), transferred from Dr Horton at [place] for treatment of colo-enteric and colo-cutaneous fistula.  She was interviewed with the .  Briefly, she had part of her colon removed in [place] in 2015 for which she says was a polyp prepped inside her colon or about infection.  This resulted in a colostomy.  She then states that she was seen at [facility] in 2015, where they attempted a colostomy reversal.  She remembers being told that the surgeon in [place] had done the operation wrong, and that they said they could not reverse her colostomy.  She then established care with [facility].  Has had multiple problems with chronic wound infections, draining fistulas, nausea, and abdominal pain over the last 5 years as below.  Eventually was able to go a lysis of adhesions with reversal of her colostomy in November 2016.  States that since that time she has continued to have pain on the left side near her colostomy.  Has developed new enterocutaneous fistulized since that procedure to the midline and to the colostomy site.         Procedure(s) Performed:   1.  Low anterior resection with de-rotation of right colon and colorectal anastomosis  2.  Lysis of adhesions.  Please add 22 modifier, as this added approximately 8 hr to the duration of procedure  3.  Small-bowel resection with hand-sewn anastomosis  4.   Diverting ileostomy    Fever overnight  WBC WNL    CT with large fluid collection along anterior abdominal wall, no evidence of contrast extravasation    WBC WNL but up to 10.5k from 6.     Procedure complete, successful drainage of abdominal abcess and placement of abcess drain per MD, 70 ml of clear straw colored fluid drained, specimen collected and sent to lab for cultures    Findings:   Successful placement of drain in peritoneal fluid collection.     Specimen Information: Abdomen; Abscess   Component 4d ago  Aerobic Bacterial Culture Abnormal   ESCHERICHIA COLI ESBL   Few     UTI (urinary tract infection)  Cultures growing E. Coli sensitive to ertapenem. First dose given 8/7    ciprofloxacin (CIPRO)400mg/200ml D5W IVPB 400 mg     ertapenem (INVANZ) 1 g in sodium chloride 0.9 % 100 mL IVPB      H&P 7/15                               Op Note 7/31                CRS PN 8/5       CRS PN 8/6       CRS PN 8/6     IR Nursing PN 8/6         IR Procedure Report 8/6      Cultures 8/6                CRS PN 8/10     MAR 8/6- 8/7     MAR 8/7- 8/10                                                                                 Provider, please further specify the diagnosis of _peritoneal fluid collection_    [  x ] Abscess, please further specify       [x   ] Complication of surgery on 7/31/2020       [   ] Not a complication of surgery on 7/31/2020       [   ] Other clarification, please specify ____________       [   ] Clinically undetermined complication status      [   ] Seroma, please further specify        [   ] Complication of surgery on 7/31/2020       [   ] Not a complication of surgery on 7/31/2020       [   ] Other clarification, please specify ____________       [   ] Clinically undetermined complication status      [   ] Other diagnosis (please specify): ____________     [  ] Clinically undetermined                                                                                                           Present  on admission (POA) status:   [   ] Yes (Y)                          [  ] Clinically Undetermined (W)  [ x  ] No (N)                            [   ] Documentation insufficient to determine if condition is POA (U)

## 2020-08-10 NOTE — ASSESSMENT & PLAN NOTE
70 y.o. female 10 Days Post-Op for Procedure(s) (LRB):  RESECTION, COLON, LOW ANTERIOR (N/A)  CYSTOSCOPY (N/A)  CATHETERIZATION, URETER (Bilateral)  CREATION, ILEOSTOMY  LYSIS, ADHESIONS    NG tube removed 8/10. Continue NPO status.   Wound dressings changed this AM. Will place wound vac later today.   Add scopolamine patch   Continue TPN   Follow up morning labs   Follow up PENNY Cr  IV pain meds  SCDs/DVT prophylaxis  GI prophylaxis  Encourage IS  PT/OT. Encourage to chair and ambulation.     Continue communication with use of

## 2020-08-10 NOTE — PT/OT/SLP PROGRESS
Occupational Therapy   Treatment    Name: Azucena Morrison  MRN: [id number]  Admitting Diagnosis:  Cutaneous fistula  10 Days Post-Op    Recommendations:     Discharge Recommendations: home health OT  Discharge Equipment Recommendations:  walker, rolling  Barriers to discharge:  None    Assessment:     Azucena Morrison is a 70 y.o. female with a medical diagnosis of Cutaneous fistula.  She presents with performance deficits affecting function are weakness, impaired endurance, impaired self care skills, impaired functional mobilty, gait instability, impaired balance, decreased safety awareness, pain, impaired cardiopulmonary response to activity. Pt continues to require frequent verbal cues for safety as she reaches for objects and for safety with lines. Pt showed improvement in mobility with RW. Pt would benefit from skilled OT services in order to maximize independence with ADLs and facilitate safe discharge. Pt would benefit from home health OT once medically stable for discharge.     Rehab Prognosis:  Good; patient would benefit from acute skilled OT services to address these deficits and reach maximum level of function.       Plan:     Patient to be seen 3 x/week to address the above listed problems via self-care/home management, therapeutic activities, therapeutic exercises  · Plan of Care Expires: 08/31/20  · Plan of Care Reviewed with: patient    Subjective     Pain/Comfort:  · Pain Rating 1: (reports abdominal pain; utilized PCA pump prior to mobility)    Objective:     Communicated with: RN prior to session.  Patient found sitting EOB with PICC line, PCA upon OT entry to room.    General Precautions: Standard, fall   Orthopedic Precautions:N/A   Braces: N/A     Occupational Performance:     Functional Mobility/Transfers:  · Patient completed Sit <> Stand Transfer with stand by assistance  with  no assistive device   · Functional Mobility: Pt ambulated ~20 feet with min A and HHA. Pt required frequent cues for  safety and was frequently reaching for walls/objects for steadying. Pt then ambulated ~80 ft with CGA and RW in order to maximize functional activity tolerance and standing balance required for engagement in occupations of choice.  Pt demonstrated improvement in stability and safety awareness when ambulating with RW. However, pt required cuing for upright posture.     Activities of Daily Living:  · Pt stated she participated in ADLs with nursing prior to OT arrival      [facility] 6 Click ADL: 19    Treatment & Education:  -Therapist provided facilitation and instruction of proper body mechanics, energy conservation, and fall prevention strategies during tasks listed above.  -Pt educated on role of OT, POC and goals for therapy  -Pt educated on importance of OOB activities with staff member assistance and sitting OOB majority of the day.   -Pt verbalized understanding. Pt expressed no further concerns/questions  -Whiteboard updated    Patient left up in chair with all lines intact, call button in reach and RN presentEducation:      GOALS:   Multidisciplinary Problems     Occupational Therapy Goals        Problem: Occupational Therapy Goal    Goal Priority Disciplines Outcome Interventions   Occupational Therapy Goal     OT, PT/OT Ongoing, Progressing    Description: Goals to be met by: 8/11/20     Patient will increase functional independence with ADLs by performing:    Feeding with [place].  UE Dressing with [place].  LE Dressing with Stand-by Assistance.  Grooming while standing at sink with Supervision.  Toileting from toilet with Supervision for hygiene and clothing management.   Bathing from  standing at sink with Supervision.  Toilet transfer to toilet with Supervision.                     Time Tracking:     OT Date of Treatment: 08/10/20  OT Start Time: 1111  OT Stop Time: 1125  OT Total Time (min): 14 min    Billable Minutes:Therapeutic Activity 14    Trinh Loyola OT  8/10/2020

## 2020-08-10 NOTE — PROGRESS NOTES
"[facility]  Endocrinology  Progress Note    Admit Date: 7/15/2020     Reason for Consult: Management of T2DM, Hyperglycemia     Surgical Procedure and Date: n/a    Lab Results   Component Value Date    HGBA1C 6.7 (H) 07/09/2020     Diabetes diagnosis year: 2015    Home Diabetes Medications:  Lantus 10 units daily if am BG >180 and Metformin 1g BID    How often checking glucose at home? once daily   BG readings on regimen: 150-low 200s  Hypoglycemia on the regimen?  No  Missed doses on regimen?  No    Diabetes Complications include:     Hyperglycemia    Complicating diabetes co morbidities:   CKD      HPI:   Patient is a 70 y.o. female with a diagnosis of T2DM, HTN, and complicated surgical history, including h/o colon resection and Aiyana procedure, takedown colostomy, colostomy closure, clot of colo cutaneous fistula. She presnts with abdominal pain and vomiting. 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 CT scan from OSH showed partially obstructed small bowel. She was admitted to [facility]. Endocrinology consulted for management of T2DM. Of note, patient's primary language is Omani.  An  was used for this consult.       Interval HPI:   Overnight events:  BG remains within goal and reasonably controlled on current SQ insulin regimen.   Diet NPO Except for: Sips with Medication (can have sips of clear liquids), Ice Chips, Medication  TPN ADULT CENTRAL LINE CUSTOM  TPN ADULT CENTRAL LINE CUSTOM  10 Days Post-Op    Eating:   NPO  Nausea: No  Hypoglycemia and intervention: No  Fever: No  TPN and/or TF: Yes  If yes, type of TF/TPN and rate: TPN at 75 cc/hr.     /70 (BP Location: Right arm, Patient Position: Lying)   Pulse 86   Temp 96.8 °F (36 °C) (Oral)   Resp 12   Ht 5' 1" (1.549 m)   Wt 70.7 kg (155 lb 13.8 oz)   LMP  (LMP Unknown)   SpO2 96%   Breastfeeding No   BMI 29.45 kg/m²     Labs Reviewed and Include    Recent " Labs   Lab 08/10/20  0451   *   CALCIUM 8.6*   ALBUMIN 1.3*   PROT 6.2   *   K 4.7   CO2 28   CL 98   BUN 15   CREATININE 0.8   ALKPHOS 320*   ALT 18   AST 18   BILITOT 0.3     Lab Results   Component Value Date    WBC 13.00 (H) 08/10/2020    HGB 7.2 (L) 08/10/2020    HCT 23.5 (L) 08/10/2020    MCV 83 08/10/2020     (H) 08/10/2020     No results for input(s): TSH, FREET4 in the last 168 hours.  Lab Results   Component Value Date    HGBA1C 6.7 (H) 07/09/2020       Nutritional status:   Body mass index is 29.45 kg/m².  Lab Results   Component Value Date    ALBUMIN 1.3 (L) 08/10/2020    ALBUMIN 1.3 (L) 08/09/2020    ALBUMIN 1.3 (L) 08/08/2020     Lab Results   Component Value Date    PREALBUMIN 21 07/15/2020       Estimated Creatinine Clearance: 59.7 mL/min (based on SCr of 0.8 mg/dL).    Accu-Checks  Recent Labs     08/08/20  2108 08/09/20  0022 08/09/20  0404 08/09/20  0825 08/09/20  1217 08/09/20  1618 08/09/20  2020 08/09/20  2347 08/10/20  0447 08/10/20  0819   POCTGLUCOSE 172* 127* 193* 190* 150* 177* 112* 87 219* 146*       Current Medications and/or Treatments Impacting Glycemic Control  Immunotherapy:    Immunosuppressants     None        Steroids:   Hormones (From admission, onward)    None        Pressors:    Autonomic Drugs (From admission, onward)    None        Hyperglycemia/Diabetes Medications:   Antihyperglycemics (From admission, onward)    Start     Stop Route Frequency Ordered    08/08/20 0800  insulin aspart U-100 pen 14 Units      -- SubQ Every 24 hours (non-standard times) 08/07/20 0952    08/08/20 0400  insulin aspart U-100 pen 14 Units      -- SubQ Every 24 hours (non-standard times) 08/07/20 0952    08/08/20 0000  insulin aspart U-100 pen 14 Units      -- SubQ Every 24 hours (non-standard times) 08/07/20 0952    08/07/20 2000  insulin aspart U-100 pen 14 Units      -- SubQ Every 24 hours (non-standard times) 08/07/20 0952 08/07/20 1600  insulin aspart U-100 pen 14 Units       -- SubQ Every 24 hours (non-standard times) 08/07/20 0952    08/07/20 1200  insulin aspart U-100 pen 14 Units      -- SubQ Every 24 hours (non-standard times) 08/07/20 0952    08/04/20 1409  insulin aspart U-100 pen 1-10 Units      -- SubQ Every 4 hours PRN 08/04/20 1310          ASSESSMENT and PLAN    * Cutaneous fistula  Managed per primary team  Optimize BG control to improve wound healing        Type 2 diabetes mellitus without complication, with long-term current use of insulin  BG goal 140-180    -  Novolog to 14units q 4 hrs while on TPN. HOLD if TPN is stopped for any reason.   - Continue Moderate Dose Correction Scale PRN BG excursions.   - BG monitoring q 4 hrs while on TPN (coordinate with meals during the day)     ** Please call Endocrine for any BG related issues **  ** Please call Endocrine if TPN is restarted or diet changes **    Discharge plans:  TBD. Please notify endocrinology prior to discharge.       On total parenteral nutrition (TPN)  May elevate BG readings  May increase insulin requirements            Medardo Godoy NP  Endocrinology  [facility]

## 2020-08-10 NOTE — PT/OT/SLP PROGRESS
Physical Therapy      Patient Name:  Azucena Morrison   MRN:  [id number]    Patient not seen today. Per chart review pt remains appropriate for therapy services.  Will follow-up per POC as appropriate.     Pt up OOB and ambulating in hallway with OT. Pt is ambulatory and steady with use of RW. Requires continued encouragement to ambulate and perform OOB mobility outside of using the bathroom.    Froylan Chacon, PTA

## 2020-08-10 NOTE — ASSESSMENT & PLAN NOTE
Monitor BP  Continue metoprolol 5 mg q6h injections while NPO. Consider restarting home BP meds when diet is able to be advance diet.

## 2020-08-10 NOTE — RESPIRATORY THERAPY
Rapid Response Respiratory Therapy ETCO2 Check     Time of visit: 750     Code Status: Full Code   : 1950  Bed: 2/1022 A:   MRN: [id number]    SITUATION     Evaluated patient for: ETCO2 Compliance     BACKGROUND     Patient has a past medical history of Chronic kidney disease, stage 3, Diabetes mellitus, Diabetes mellitus, type 2, Hypertension, and UTI (urinary tract infection).    ASSESSMENT     Is the ETCO2 monitor on? (Yes/No)  Yes   Is the patient wearing a cannula? (Yes/No) Yes  Are ETCO2 orders placed? (Yes/No) Yes  Is the patient on a PCA pump? (Yes/No) Yes  ETCO2 monitored: ETCO2 (mmHg): 27 mmHg  O2 Device/Concentration: Room Air  Pulse: 86 Respiratory rate: 12 Temperature: Temp: 96.8 °F (36 °C) BP: BP: 136/70 SpO2: 96%  Level of Consciousness: Level of Consciousness (AVPU): alert  All Lung Field Breath Sounds: All Lung Fields Breath Sounds: Anterior:, Lateral:  MAGED Breath Sounds: clear  LLL Breath Sounds: clear  RUL Breath Sounds: clear  RML Breath Sounds: clear  RLL Breath Sounds: diminished  Ambu at bedside: Ambu bag with the patient?: Yes, Adult Ambu    INTERVENTIONS/RECOMMENDATIONS     Patient continue to wear EtCO2 cannula while on PCA pump for patient safety.  Please call respiratory with any questions or concerns.    PHYSICIAN ESCALATION     Physician Escalation (Yes/No): no  Discussed plan of care primary RTPOLLO RRT     FOLLOW-UP     Please call back the Rapid Response RTGabrielle RRT at x [phone] for any questions or concerns.

## 2020-08-10 NOTE — PLAN OF CARE
Plan of care reviewed with patient questions and concerns addressed and verbalized understanding.  No acute events overnight.  All vital signs stable. C/o abdominal pain and nausea overnight; encouraged PCA pump use. AAOx4.  Safety and fall precautions maintained. Encouraged IS use and getting OOB to go to the bathroom. Telemetry= NSR, Abdominal wound intact, ostomy draining brown, liquid stool. Left IR drain with small amount of orange drainage. Voiding per perwick. TPN at 75ml/hr.  See flow sheet for further information.  Will continue to monitor.

## 2020-08-10 NOTE — SUBJECTIVE & OBJECTIVE
"Interval HPI:   Overnight events:  BG remains within goal and reasonably controlled on current SQ insulin regimen.   Diet NPO Except for: Sips with Medication (can have sips of clear liquids), Ice Chips, Medication  TPN ADULT CENTRAL LINE CUSTOM  TPN ADULT CENTRAL LINE CUSTOM  10 Days Post-Op    Eating:   NPO  Nausea: No  Hypoglycemia and intervention: No  Fever: No  TPN and/or TF: Yes  If yes, type of TF/TPN and rate: TPN at 75 cc/hr.     /70 (BP Location: Right arm, Patient Position: Lying)   Pulse 86   Temp 96.8 °F (36 °C) (Oral)   Resp 12   Ht 5' 1" (1.549 m)   Wt 70.7 kg (155 lb 13.8 oz)   LMP  (LMP Unknown)   SpO2 96%   Breastfeeding No   BMI 29.45 kg/m²     Labs Reviewed and Include    Recent Labs   Lab 08/10/20  0451   *   CALCIUM 8.6*   ALBUMIN 1.3*   PROT 6.2   *   K 4.7   CO2 28   CL 98   BUN 15   CREATININE 0.8   ALKPHOS 320*   ALT 18   AST 18   BILITOT 0.3     Lab Results   Component Value Date    WBC 13.00 (H) 08/10/2020    HGB 7.2 (L) 08/10/2020    HCT 23.5 (L) 08/10/2020    MCV 83 08/10/2020     (H) 08/10/2020     No results for input(s): TSH, FREET4 in the last 168 hours.  Lab Results   Component Value Date    HGBA1C 6.7 (H) 07/09/2020       Nutritional status:   Body mass index is 29.45 kg/m².  Lab Results   Component Value Date    ALBUMIN 1.3 (L) 08/10/2020    ALBUMIN 1.3 (L) 08/09/2020    ALBUMIN 1.3 (L) 08/08/2020     Lab Results   Component Value Date    PREALBUMIN 21 07/15/2020       Estimated Creatinine Clearance: 59.7 mL/min (based on SCr of 0.8 mg/dL).    Accu-Checks  Recent Labs     08/08/20  2108 08/09/20  0022 08/09/20  0404 08/09/20  0825 08/09/20  1217 08/09/20  1618 08/09/20 2020 08/09/20  2347 08/10/20  0447 08/10/20  0819   POCTGLUCOSE 172* 127* 193* 190* 150* 177* 112* 87 219* 146*       Current Medications and/or Treatments Impacting Glycemic Control  Immunotherapy:    Immunosuppressants     None        Steroids:   Hormones (From admission, " onward)    None        Pressors:    Autonomic Drugs (From admission, onward)    None        Hyperglycemia/Diabetes Medications:   Antihyperglycemics (From admission, onward)    Start     Stop Route Frequency Ordered    08/08/20 0800  insulin aspart U-100 pen 14 Units      -- SubQ Every 24 hours (non-standard times) 08/07/20 0952    08/08/20 0400  insulin aspart U-100 pen 14 Units      -- SubQ Every 24 hours (non-standard times) 08/07/20 0952    08/08/20 0000  insulin aspart U-100 pen 14 Units      -- SubQ Every 24 hours (non-standard times) 08/07/20 0952    08/07/20 2000  insulin aspart U-100 pen 14 Units      -- SubQ Every 24 hours (non-standard times) 08/07/20 0952    08/07/20 1600  insulin aspart U-100 pen 14 Units      -- SubQ Every 24 hours (non-standard times) 08/07/20 0952    08/07/20 1200  insulin aspart U-100 pen 14 Units      -- SubQ Every 24 hours (non-standard times) 08/07/20 0952    08/04/20 1409  insulin aspart U-100 pen 1-10 Units      -- SubQ Every 4 hours PRN 08/04/20 1310

## 2020-08-10 NOTE — SUBJECTIVE & OBJECTIVE
Subjective:     Interval History:   No acute events overnight. Afebrile.  NG removed yesterday. Denies nausea, but did receive a dose of zofran overnight.   States she feels better than yesterday. Pain somewhat improved.   PENNY drain with serosanguinous output.     Post-Op Info:  Procedure(s) (LRB):  RESECTION, COLON, LOW ANTERIOR (N/A)  CYSTOSCOPY (N/A)  CATHETERIZATION, URETER (Bilateral)  CREATION, ILEOSTOMY  LYSIS, ADHESIONS   10 Days Post-Op      Medications:  Continuous Infusions:   dextrose 10 % in water (D10W)      hydromorphone in 0.9 % NaCl 6 mg/30 ml      TPN ADULT CENTRAL LINE CUSTOM 75 mL/hr at 08/09/20 2230    TPN ADULT CENTRAL LINE CUSTOM       Scheduled Meds:   enoxaparin  40 mg Subcutaneous Q24H    ertapenem (INVANZ) IVPB  1 g Intravenous Q24H    fat emulsion 20%  250 mL Intravenous Daily    fat emulsion 20%  250 mL Intravenous Daily    ibuprofen  800 mg Intravenous Q6H    insulin aspart U-100  14 Units Subcutaneous Q24H    insulin aspart U-100  14 Units Subcutaneous Q24H    insulin aspart U-100  14 Units Subcutaneous Q24H    insulin aspart U-100  14 Units Subcutaneous Q24H    insulin aspart U-100  14 Units Subcutaneous Q24H    insulin aspart U-100  14 Units Subcutaneous Q24H    lidocaine  1 patch Transdermal Q24H    metoprolol  5 mg Intravenous Q6H    pantoprozole (PROTONIX) IV  40 mg Intravenous Q24H    scopolamine  1 patch Transdermal Q3 Days    sodium chloride 0.9%  10 mL Intravenous Q6H     PRN Meds:   sodium chloride    calcium carbonate    dextrose 10 % in water (D10W)    dextrose 50%    glucagon (human recombinant)    HYDROmorphone    HYDROmorphone    insulin aspart U-100    naloxone    ondansetron    ondansetron    promethazine (PHENERGAN) IVPB    sodium chloride 0.9%    sodium chloride 0.9%    traMADoL        Objective:     Vital Signs (Most Recent):  Temp: 98 °F (36.7 °C) (08/10/20 0450)  Pulse: 80 (08/10/20 0700)  Resp: 18 (08/10/20 0544)  BP: (!)  140/70 (08/10/20 0620)  SpO2: 97 % (08/10/20 0620) Vital Signs (24h Range):  Temp:  [98 °F (36.7 °C)-99.8 °F (37.7 °C)] 98 °F (36.7 °C)  Pulse:  [] 80  Resp:  [12-20] 18  SpO2:  [95 %-99 %] 97 %  BP: (128-192)/(61-88) 140/70     Intake/Output - Last 3 Shifts       08/08 0700 - 08/09 0659 08/09 0700 - 08/10 0659 08/10 0700 - 08/11 0659    P.O. 0 0     I.V. (mL/kg) 44 (0.6) 53 (0.7)     Blood       IV Piggyback 550 1000     TPN 2363.3 1981.4     Total Intake(mL/kg) 2957.3 (41.8) 3034.4 (42.9)     Urine (mL/kg/hr) 200 (0.1) 1600 (0.9)     Emesis/NG output 0 0     Drains 430 60     Other 0 0     Stool 50 175     Blood 0 0     Total Output 680 1835     Net +2277.3 +1199.4            Urine Occurrence 2 x 4 x     Stool Occurrence 0 x 0 x     Emesis Occurrence 0 x 0 x           Physical Exam  Constitutional:       General: She is not in acute distress.  HENT:      Head: Normocephalic.   Eyes:      Conjunctiva/sclera: Conjunctivae normal.   Neck:      Trachea: No tracheal deviation.   Cardiovascular:      Rate and Rhythm: Regular rhythm.   Pulmonary:      Effort: Pulmonary effort is normal. No respiratory distress.   Abdominal:      Comments:   Inferior aspect of wound open with no active drainage, wound intact. Dressings with some serosanguinous fluid.   Ostomy pink. Brown liquid stool and gas in bag.   PENNY drain with serosanguinous output.     Neurological:      Mental Status: She is alert and oriented to person, place, and time.         Significant Labs:  BMP (Last 3 Results):   Recent Labs   Lab 08/07/20  0430 08/08/20  0420 08/09/20  0423   * 131* 156*   * 135* 134*   K 4.1 4.1 4.4    101 100   CO2 28 26 28   BUN 10 14 14   CREATININE 0.8 0.8 0.8   CALCIUM 8.3* 8.5* 8.4*   MG 1.7 1.7 1.8     CBC (Last 3 Results):   Recent Labs   Lab 08/08/20  0420 08/09/20  0423 08/10/20  0451   WBC 13.28* 11.50 13.00*   RBC 3.03* 2.88* 2.84*   HGB 7.7* 7.3* 7.2*   HCT 24.6* 23.6* 23.5*   * 422* 517*   MCV  81* 82 83   MCH 25.4* 25.3* 25.4*   MCHC 31.3* 30.9* 30.6*

## 2020-08-10 NOTE — PROGRESS NOTES
[facility]  Colorectal Surgery  Progress Note    Patient Name: Azucena Morrison  MRN: [id number]  Admission Date: 7/15/2020  Hospital Length of Stay: 26 days  Attending Physician: Fabiana Valiente MD    Subjective:     Interval History:   No acute events overnight. Afebrile.  NG removed yesterday. Denies nausea, but did receive a dose of zofran overnight.   States she feels better than yesterday. Pain somewhat improved.   PENNY drain with serosanguinous output.     Post-Op Info:  Procedure(s) (LRB):  RESECTION, COLON, LOW ANTERIOR (N/A)  CYSTOSCOPY (N/A)  CATHETERIZATION, URETER (Bilateral)  CREATION, ILEOSTOMY  LYSIS, ADHESIONS   10 Days Post-Op      Medications:  Continuous Infusions:   dextrose 10 % in water (D10W)      hydromorphone in 0.9 % NaCl 6 mg/30 ml      TPN ADULT CENTRAL LINE CUSTOM 75 mL/hr at 08/09/20 2230    TPN ADULT CENTRAL LINE CUSTOM       Scheduled Meds:   enoxaparin  40 mg Subcutaneous Q24H    ertapenem (INVANZ) IVPB  1 g Intravenous Q24H    fat emulsion 20%  250 mL Intravenous Daily    fat emulsion 20%  250 mL Intravenous Daily    ibuprofen  800 mg Intravenous Q6H    insulin aspart U-100  14 Units Subcutaneous Q24H    insulin aspart U-100  14 Units Subcutaneous Q24H    insulin aspart U-100  14 Units Subcutaneous Q24H    insulin aspart U-100  14 Units Subcutaneous Q24H    insulin aspart U-100  14 Units Subcutaneous Q24H    insulin aspart U-100  14 Units Subcutaneous Q24H    lidocaine  1 patch Transdermal Q24H    metoprolol  5 mg Intravenous Q6H    pantoprozole (PROTONIX) IV  40 mg Intravenous Q24H    scopolamine  1 patch Transdermal Q3 Days    sodium chloride 0.9%  10 mL Intravenous Q6H     PRN Meds:   sodium chloride    calcium carbonate    dextrose 10 % in water (D10W)    dextrose 50%    glucagon (human recombinant)    HYDROmorphone    HYDROmorphone    insulin aspart U-100    naloxone    ondansetron    ondansetron    promethazine (PHENERGAN)  IVPB    sodium chloride 0.9%    sodium chloride 0.9%    traMADoL        Objective:     Vital Signs (Most Recent):  Temp: 98 °F (36.7 °C) (08/10/20 0450)  Pulse: 80 (08/10/20 0700)  Resp: 18 (08/10/20 0544)  BP: (!) 140/70 (08/10/20 0620)  SpO2: 97 % (08/10/20 0620) Vital Signs (24h Range):  Temp:  [98 °F (36.7 °C)-99.8 °F (37.7 °C)] 98 °F (36.7 °C)  Pulse:  [] 80  Resp:  [12-20] 18  SpO2:  [95 %-99 %] 97 %  BP: (128-192)/(61-88) 140/70     Intake/Output - Last 3 Shifts       08/08 0700 - 08/09 0659 08/09 0700 - 08/10 0659 08/10 0700 - 08/11 0659    P.O. 0 0     I.V. (mL/kg) 44 (0.6) 53 (0.7)     Blood       IV Piggyback 550 1000     TPN 2363.3 1981.4     Total Intake(mL/kg) 2957.3 (41.8) 3034.4 (42.9)     Urine (mL/kg/hr) 200 (0.1) 1600 (0.9)     Emesis/NG output 0 0     Drains 430 60     Other 0 0     Stool 50 175     Blood 0 0     Total Output 680 1835     Net +2277.3 +1199.4            Urine Occurrence 2 x 4 x     Stool Occurrence 0 x 0 x     Emesis Occurrence 0 x 0 x           Physical Exam  Constitutional:       General: She is not in acute distress.  HENT:      Head: Normocephalic.   Eyes:      Conjunctiva/sclera: Conjunctivae normal.   Neck:      Trachea: No tracheal deviation.   Cardiovascular:      Rate and Rhythm: Regular rhythm.   Pulmonary:      Effort: Pulmonary effort is normal. No respiratory distress.   Abdominal:      Comments:   Inferior aspect of wound open with no active drainage, wound intact. Dressings with some serosanguinous fluid.   Ostomy pink. Brown liquid stool and gas in bag.   PENNY drain with serosanguinous output.     Neurological:      Mental Status: She is alert and oriented to person, place, and time.         Significant Labs:  BMP (Last 3 Results):   Recent Labs   Lab 08/07/20  0430 08/08/20  0420 08/09/20  0423   * 131* 156*   * 135* 134*   K 4.1 4.1 4.4    101 100   CO2 28 26 28   BUN 10 14 14   CREATININE 0.8 0.8 0.8   CALCIUM 8.3* 8.5* 8.4*   MG 1.7  1.7 1.8     CBC (Last 3 Results):   Recent Labs   Lab 08/08/20  0420 08/09/20  0423 08/10/20  0451   WBC 13.28* 11.50 13.00*   RBC 3.03* 2.88* 2.84*   HGB 7.7* 7.3* 7.2*   HCT 24.6* 23.6* 23.5*   * 422* 517*   MCV 81* 82 83   MCH 25.4* 25.3* 25.4*   MCHC 31.3* 30.9* 30.6*     Assessment/Plan:     * Cutaneous fistula  70 y.o. female 10 Days Post-Op for Procedure(s) (LRB):  RESECTION, COLON, LOW ANTERIOR (N/A)  CYSTOSCOPY (N/A)  CATHETERIZATION, URETER (Bilateral)  CREATION, ILEOSTOMY  LYSIS, ADHESIONS    NG tube removed 8/10. Continue NPO status.   Wound dressings changed this AM. Will place wound vac later today.   Add scopolamine patch   Continue TPN   Follow up morning labs   Follow up PENNY Cr  IV pain meds  SCDs/DVT prophylaxis  GI prophylaxis  Encourage IS  PT/OT. Encourage to chair and ambulation.     Continue communication with use of       On total parenteral nutrition (TPN)  Reorder.    UTI (urinary tract infection)  Cultures growing E. Coli sensitive to ertapenem. First dose given 8/7.     Acute blood loss anemia  IV ferric gluconate given 8/8. Hb stable this morning at 7.2.    Chronic kidney disease, stage 3  Monitor labs  Monitor I&O    Creatinine stable at 0.8. Adequate UOP.    Generalized abdominal pain  Continue IV pain meds      Essential hypertension  Monitor BP  Continue metoprolol 5 mg q6h injections while NPO. Consider restarting home BP meds when diet is able to be advance diet.     Type 2 diabetes mellitus without complication, with long-term current use of insulin  SSI  On scheduled insulin ordered by Endo team.  Accu check Q6    Endocrinology consulted. Appreciate their recommendations.           Ramonita Osorio MD  Colorectal Surgery  [facility]

## 2020-08-10 NOTE — ASSESSMENT & PLAN NOTE
BG goal 140-180    -  Novolog to 14units q 4 hrs while on TPN. HOLD if TPN is stopped for any reason.   - Continue Moderate Dose Correction Scale PRN BG excursions.   - BG monitoring q 4 hrs while on TPN (coordinate with meals during the day)     ** Please call Endocrine for any BG related issues **  ** Please call Endocrine if TPN is restarted or diet changes **    Discharge plans:  TBD. Please notify endocrinology prior to discharge.

## 2020-08-11 LAB
ALBUMIN SERPL BCP-MCNC: 1.4 G/DL (ref 3.5–5.2)
ALP SERPL-CCNC: 314 U/L (ref 55–135)
ALT SERPL W/O P-5'-P-CCNC: 18 U/L (ref 10–44)
ANION GAP SERPL CALC-SCNC: 9 MMOL/L (ref 8–16)
AST SERPL-CCNC: 18 U/L (ref 10–40)
BASOPHILS # BLD AUTO: 0.03 K/UL (ref 0–0.2)
BASOPHILS NFR BLD: 0.3 % (ref 0–1.9)
BILIRUB SERPL-MCNC: 0.3 MG/DL (ref 0.1–1)
BUN SERPL-MCNC: 16 MG/DL (ref 8–23)
CALCIUM SERPL-MCNC: 9.1 MG/DL (ref 8.7–10.5)
CHLORIDE SERPL-SCNC: 98 MMOL/L (ref 95–110)
CO2 SERPL-SCNC: 27 MMOL/L (ref 23–29)
CREAT SERPL-MCNC: 0.9 MG/DL (ref 0.5–1.4)
CRP SERPL-MCNC: 262.5 MG/L (ref 0–8.2)
DIFFERENTIAL METHOD: ABNORMAL
EOSINOPHIL # BLD AUTO: 0.4 K/UL (ref 0–0.5)
EOSINOPHIL NFR BLD: 3.7 % (ref 0–8)
ERYTHROCYTE [DISTWIDTH] IN BLOOD BY AUTOMATED COUNT: 18.6 % (ref 11.5–14.5)
EST. GFR  (AFRICAN AMERICAN): >60 ML/MIN/1.73 M^2
EST. GFR  (NON AFRICAN AMERICAN): >60 ML/MIN/1.73 M^2
GLUCOSE SERPL-MCNC: 137 MG/DL (ref 70–110)
HCT VFR BLD AUTO: 23.8 % (ref 37–48.5)
HGB BLD-MCNC: 7.3 G/DL (ref 12–16)
IMM GRANULOCYTES # BLD AUTO: 0.12 K/UL (ref 0–0.04)
IMM GRANULOCYTES NFR BLD AUTO: 1 % (ref 0–0.5)
LYMPHOCYTES # BLD AUTO: 1.3 K/UL (ref 1–4.8)
LYMPHOCYTES NFR BLD: 11.2 % (ref 18–48)
MAGNESIUM SERPL-MCNC: 1.9 MG/DL (ref 1.6–2.6)
MCH RBC QN AUTO: 25.2 PG (ref 27–31)
MCHC RBC AUTO-ENTMCNC: 30.7 G/DL (ref 32–36)
MCV RBC AUTO: 82 FL (ref 82–98)
MONOCYTES # BLD AUTO: 0.9 K/UL (ref 0.3–1)
MONOCYTES NFR BLD: 7.9 % (ref 4–15)
NEUTROPHILS # BLD AUTO: 8.9 K/UL (ref 1.8–7.7)
NEUTROPHILS NFR BLD: 75.9 % (ref 38–73)
NRBC BLD-RTO: 0 /100 WBC
PHOSPHATE SERPL-MCNC: 5.4 MG/DL (ref 2.7–4.5)
PLATELET # BLD AUTO: 609 K/UL (ref 150–350)
PMV BLD AUTO: 8.7 FL (ref 9.2–12.9)
POCT GLUCOSE: 137 MG/DL (ref 70–110)
POCT GLUCOSE: 182 MG/DL (ref 70–110)
POCT GLUCOSE: 222 MG/DL (ref 70–110)
POCT GLUCOSE: 237 MG/DL (ref 70–110)
POCT GLUCOSE: 84 MG/DL (ref 70–110)
POCT GLUCOSE: 93 MG/DL (ref 70–110)
POTASSIUM SERPL-SCNC: 4.7 MMOL/L (ref 3.5–5.1)
PROT SERPL-MCNC: 6.5 G/DL (ref 6–8.4)
RBC # BLD AUTO: 2.9 M/UL (ref 4–5.4)
SODIUM SERPL-SCNC: 134 MMOL/L (ref 136–145)
WBC # BLD AUTO: 11.76 K/UL (ref 3.9–12.7)

## 2020-08-11 PROCEDURE — 94770 HC EXHALED C02 TEST: CPT

## 2020-08-11 PROCEDURE — 86140 C-REACTIVE PROTEIN: CPT

## 2020-08-11 PROCEDURE — 25000003 PHARM REV CODE 250: Performed by: STUDENT IN AN ORGANIZED HEALTH CARE EDUCATION/TRAINING PROGRAM

## 2020-08-11 PROCEDURE — 25500020 PHARM REV CODE 255: Performed by: COLON & RECTAL SURGERY

## 2020-08-11 PROCEDURE — 84100 ASSAY OF PHOSPHORUS: CPT

## 2020-08-11 PROCEDURE — 63600175 PHARM REV CODE 636 W HCPCS: Performed by: STUDENT IN AN ORGANIZED HEALTH CARE EDUCATION/TRAINING PROGRAM

## 2020-08-11 PROCEDURE — 97530 THERAPEUTIC ACTIVITIES: CPT | Mod: CQ

## 2020-08-11 PROCEDURE — 97116 GAIT TRAINING THERAPY: CPT | Mod: CQ

## 2020-08-11 PROCEDURE — 85025 COMPLETE CBC W/AUTO DIFF WBC: CPT

## 2020-08-11 PROCEDURE — 83735 ASSAY OF MAGNESIUM: CPT

## 2020-08-11 PROCEDURE — 25500020 PHARM REV CODE 255: Performed by: STUDENT IN AN ORGANIZED HEALTH CARE EDUCATION/TRAINING PROGRAM

## 2020-08-11 PROCEDURE — A4216 STERILE WATER/SALINE, 10 ML: HCPCS | Performed by: STUDENT IN AN ORGANIZED HEALTH CARE EDUCATION/TRAINING PROGRAM

## 2020-08-11 PROCEDURE — 99232 PR SUBSEQUENT HOSPITAL CARE,LEVL II: ICD-10-PCS | Mod: ,,, | Performed by: NURSE PRACTITIONER

## 2020-08-11 PROCEDURE — 20600001 HC STEP DOWN PRIVATE ROOM

## 2020-08-11 PROCEDURE — 94761 N-INVAS EAR/PLS OXIMETRY MLT: CPT

## 2020-08-11 PROCEDURE — C9113 INJ PANTOPRAZOLE SODIUM, VIA: HCPCS | Performed by: STUDENT IN AN ORGANIZED HEALTH CARE EDUCATION/TRAINING PROGRAM

## 2020-08-11 PROCEDURE — 80053 COMPREHEN METABOLIC PANEL: CPT

## 2020-08-11 PROCEDURE — 63600175 PHARM REV CODE 636 W HCPCS: Performed by: HOSPITALIST

## 2020-08-11 PROCEDURE — 27000221 HC OXYGEN, UP TO 24 HOURS

## 2020-08-11 PROCEDURE — B4185 PARENTERAL SOL 10 GM LIPIDS: HCPCS | Performed by: STUDENT IN AN ORGANIZED HEALTH CARE EDUCATION/TRAINING PROGRAM

## 2020-08-11 PROCEDURE — 99900035 HC TECH TIME PER 15 MIN (STAT)

## 2020-08-11 PROCEDURE — 99232 SBSQ HOSP IP/OBS MODERATE 35: CPT | Mod: ,,, | Performed by: NURSE PRACTITIONER

## 2020-08-11 PROCEDURE — A4217 STERILE WATER/SALINE, 500 ML: HCPCS | Performed by: STUDENT IN AN ORGANIZED HEALTH CARE EDUCATION/TRAINING PROGRAM

## 2020-08-11 RX ORDER — INSULIN ASPART 100 [IU]/ML
12 INJECTION, SOLUTION INTRAVENOUS; SUBCUTANEOUS
Status: DISCONTINUED | OUTPATIENT
Start: 2020-08-12 | End: 2020-08-12

## 2020-08-11 RX ORDER — INSULIN ASPART 100 [IU]/ML
12 INJECTION, SOLUTION INTRAVENOUS; SUBCUTANEOUS
Status: DISCONTINUED | OUTPATIENT
Start: 2020-08-11 | End: 2020-08-12

## 2020-08-11 RX ADMIN — Medication 10 ML: at 12:08

## 2020-08-11 RX ADMIN — INSULIN ASPART 1 UNITS: 100 INJECTION, SOLUTION INTRAVENOUS; SUBCUTANEOUS at 08:08

## 2020-08-11 RX ADMIN — PANTOPRAZOLE SODIUM 40 MG: 40 INJECTION, POWDER, LYOPHILIZED, FOR SOLUTION INTRAVENOUS at 08:08

## 2020-08-11 RX ADMIN — ONDANSETRON 8 MG: 2 INJECTION INTRAMUSCULAR; INTRAVENOUS at 05:08

## 2020-08-11 RX ADMIN — METOPROLOL TARTRATE 5 MG: 5 INJECTION, SOLUTION INTRAVENOUS at 12:08

## 2020-08-11 RX ADMIN — IOHEXOL 75 ML: 350 INJECTION, SOLUTION INTRAVENOUS at 04:08

## 2020-08-11 RX ADMIN — Medication 10 ML: at 06:08

## 2020-08-11 RX ADMIN — IBUPROFEN 800 MG: 800 INJECTION INTRAVENOUS at 06:08

## 2020-08-11 RX ADMIN — IOHEXOL 15 ML: 350 INJECTION, SOLUTION INTRAVENOUS at 12:08

## 2020-08-11 RX ADMIN — ERTAPENEM 1 G: 1 INJECTION INTRAMUSCULAR; INTRAVENOUS at 10:08

## 2020-08-11 RX ADMIN — METOPROLOL TARTRATE 5 MG: 5 INJECTION, SOLUTION INTRAVENOUS at 05:08

## 2020-08-11 RX ADMIN — INSULIN ASPART 12 UNITS: 100 INJECTION, SOLUTION INTRAVENOUS; SUBCUTANEOUS at 08:08

## 2020-08-11 RX ADMIN — INSULIN ASPART 12 UNITS: 100 INJECTION, SOLUTION INTRAVENOUS; SUBCUTANEOUS at 11:08

## 2020-08-11 RX ADMIN — MAGNESIUM SULFATE HEPTAHYDRATE: 500 INJECTION, SOLUTION INTRAMUSCULAR; INTRAVENOUS at 10:08

## 2020-08-11 RX ADMIN — INSULIN ASPART 12 UNITS: 100 INJECTION, SOLUTION INTRAVENOUS; SUBCUTANEOUS at 04:08

## 2020-08-11 RX ADMIN — INSULIN ASPART 12 UNITS: 100 INJECTION, SOLUTION INTRAVENOUS; SUBCUTANEOUS at 05:08

## 2020-08-11 RX ADMIN — INSULIN ASPART 12 UNITS: 100 INJECTION, SOLUTION INTRAVENOUS; SUBCUTANEOUS at 12:08

## 2020-08-11 RX ADMIN — I.V. FAT EMULSION 250 ML: 20 EMULSION INTRAVENOUS at 10:08

## 2020-08-11 RX ADMIN — IBUPROFEN 800 MG: 800 INJECTION INTRAVENOUS at 11:08

## 2020-08-11 RX ADMIN — ENOXAPARIN SODIUM 40 MG: 40 INJECTION SUBCUTANEOUS at 06:08

## 2020-08-11 RX ADMIN — SODIUM CHLORIDE: 9 INJECTION, SOLUTION INTRAVENOUS at 08:08

## 2020-08-11 RX ADMIN — METOPROLOL TARTRATE 5 MG: 5 INJECTION, SOLUTION INTRAVENOUS at 06:08

## 2020-08-11 NOTE — PROGRESS NOTES
Wound care follow up for ileostomy.    Received pt awake and alert with nurse at bedside. Chart reflects NG tube has been removed and a wound vac was applied to the pt's abdominal midline. Per pt's nurse, pt's ostomy bag had burst and had caused a disruption with the wound vac.    Upon assessment of RLQ ileostomy: oval shaped stoma measuring 25 mm rosebud in appearance protruding slightly above skin level with some mucosal separation noted to the inferior portion of the stoma. Peristomal skin is intact without any skin breakdown noted at this time. Cleansed peristomal area with water and applied sensi care barrier wipe around peristomal skin. Applied a 1 piece convexity wound pouch to ostomy with an Gustavo ring applied to ensure seal with stoma powder applied to area of mucosal separation. Pt tolerated well.    To abdominal midline: able to achieve good seal at 125mmHg with wound vac by reinforcing drape with tegafilm. Will order additional wound vac supplies if needed.    (see photo below)    Discussed with nurse that wound care team will check on pt again this afternoon, and that pt may benefit from having a convex urostomy pouch with bag attachement applied temporarily to illeostomy site due to increased output. Will bring supplies.    Informed pt that [facility] was contacted and that starter kits should be on their way. Pt verbalized understanding. Pt understands basic concepts of ostomy care, but needs assistance with pouch selection.    Plan of care discussed with pt's nurse. Will follow up with again today. [id number]      RLQ ileostomy measuring 25mm

## 2020-08-11 NOTE — RESPIRATORY THERAPY
Rapid Response Respiratory Therapy ETCO2 Check     Time of visit: 1535     Code Status: Full Code   : 1950  Bed: 2/1022 A:   MRN: [id number]    SITUATION     Evaluated patient for: ETCO2 Compliance     BACKGROUND     Patient has a past medical history of Chronic kidney disease, stage 3, Diabetes mellitus, Diabetes mellitus, type 2, Hypertension, and UTI (urinary tract infection).    ASSESSMENT     Is the ETCO2 monitor on? (Yes/No)  Yes   Is the patient wearing a cannula? (Yes/No) Yes  Are ETCO2 orders placed? (Yes/No) Yes  Is the patient on a PCA pump? (Yes/No) Yes  ETCO2 monitored: ETCO2 (mmHg): 35 mmHg  O2 Device/Concentration: Nasal cannula 1L  Pulse: 79 Respiratory rate: 14 Temperature: Temp: 99 °F (37.2 °C) BP: BP: (!) 152/78 SpO2: 99%  Level of Consciousness: Level of Consciousness (AVPU): alert  All Lung Field Breath Sounds: All Lung Fields Breath Sounds: Anterior:, Lateral:, clear  Ambu at bedside: Ambu bag with the patient?: Yes, Adult Ambu    INTERVENTIONS/RECOMMENDATIONS     Continue plan of care.    PHYSICIAN ESCALATION     Physician Escalation (Yes/No): No      Discussed plan of care primary RT, Kala     FOLLOW-UP     Please call back the Rapid Response RT, Yvette Simmons, RRT at x [phone] for any questions or concerns.

## 2020-08-11 NOTE — ASSESSMENT & PLAN NOTE
BG goal 140-180    -  Novolog to 12 units q 4 hrs while on TPN. HOLD if TPN is stopped for any reason.   - Continue Moderate Dose Correction Scale PRN BG excursions.   - BG monitoring q 4 hrs while on TPN (coordinate with meals during the day)     ** Please call Endocrine for any BG related issues **  ** Please call Endocrine if TPN is restarted or diet changes **    Discharge plans:  TBD. Please notify endocrinology prior to discharge.

## 2020-08-11 NOTE — PT/OT/SLP PROGRESS
Physical Therapy Treatment    Patient Name:  Azucena Morrison   MRN:  [id number]    Recommendations:     Discharge Recommendations:  home health PT   Discharge Equipment Recommendations: walker, rolling   Barriers to discharge: decreased functional mobility    Assessment:     Azucena Morrison is a 70 y.o. female admitted with a medical diagnosis of Cutaneous fistula.  She presents with the following impairments/functional limitations:  weakness, impaired endurance, impaired self care skills, impaired functional mobilty, gait instability, impaired balance, decreased safety awareness, pain, impaired cardiopulmonary response to activity. Pt found sitting up to EOB calling out into hallway with no one present in room. Pt requests assistance to bathroom. MARS not present in room. Pt directed with visual/tactile cues as needed. Pt communicates in broken English to best of her ability. Pt asks if she can walk and is assisted with ambulation in hallway and is left [facility] at end of session. Pt remains mildly unsteady and with poor safety awareness, especially to her line management. Pt will continue to benefit from therapy services to address impairments listed above.     Rehab Prognosis: Good; patient would benefit from acute skilled PT services to address these deficits and reach maximum level of function.    Recent Surgery: Procedure(s) (LRB):  RESECTION, COLON, LOW ANTERIOR (N/A)  CYSTOSCOPY (N/A)  CATHETERIZATION, URETER (Bilateral)  CREATION, ILEOSTOMY  LYSIS, ADHESIONS 11 Days Post-Op    Plan:     During this hospitalization, patient to be seen 3 x/week to address the identified rehab impairments via gait training, therapeutic activities, therapeutic exercises, neuromuscular re-education and progress toward the following goals:    · Plan of Care Expires:  08/29/20    Subjective     Chief Complaint: pain / need for bathroom  Pain/Comfort:  · Pain Rating 1: other (see comments)(unrated c/o abdomen pain)  · Location -  Orientation 1: generalized  · Location 1: abdomen  · Pain Addressed 1: Reposition, Distraction, Other (see comments), Pre-medicate for activity(active PCA in use)      Objective:     Communicated with NSG prior to session.  Patient found sitting EOB with PICC line, PCA upon PT entry to room.     General Precautions: Standard, fall   Orthopedic Precautions:N/A   Braces: N/A     Functional Mobility:  · Transfers:     · Sit to Stand:  stand by assistance with rolling walker  · Bed to Chair: contact guard assistance with  rolling walker  using  Step Transfer  · Toilet Transfer: contact guard assistance with  rolling walker  using  Step Transfer  · Gait: Pt ambulates ~10 ft and ~148 ft with RW and CGA. Pt with FFP and heavy reliance on BUE support. Cues for posture and safe use of RW.       AM-PAC 6 CLICK MOBILITY  Turning over in bed (including adjusting bedclothes, sheets and blankets)?: 4  Sitting down on and standing up from a chair with arms (e.g., wheelchair, bedside commode, etc.): 3  Moving from lying on back to sitting on the side of the bed?: 4  Moving to and from a bed to a chair (including a wheelchair)?: 3  Need to walk in hospital room?: 3  Climbing 3-5 steps with a railing?: 2  Basic Mobility Total Score: 19       Therapeutic Activities and Exercises:  Pt assisted with functional mobility as noted above.   Pt assisted to/from bathroom with CGA and RW for toilet transfers. Pt performs pericare from sitting with set-up independence.       Patient left up in chair with all lines intact and call button in reach.    GOALS:   Multidisciplinary Problems     Physical Therapy Goals        Problem: Physical Therapy Goal    Goal Priority Disciplines Outcome Goal Variances Interventions   Physical Therapy Goal     PT, PT/OT Ongoing, Progressing     Description: Goals to be met by: 20     Patient will increase functional independence with mobility by performin. Supine to sit with Contact Guard  Assistance.  2. Sit to supine with Modified [place].  3. Sit to stand transfer with Modified [place].  4. Bed to chair transfer with Stand-by Assistance using Rolling Walker PRN.  5. Gait  x 100 feet with Stand-by Assistance using Rolling Walker PRN.    6. Ascend/descend 4 stair with bilateral Handrails Stand-by Assistance.   7. Lower extremity exercise program x 20 reps per handout, with assistance as needed.                     Time Tracking:     PT Received On: 08/11/20  PT Start Time: 1401     PT Stop Time: 1426  PT Total Time (min): 25 min     Billable Minutes: Gait Training 15 and Therapeutic Activity 10    Treatment Type: Treatment  PT/PTA: PTA     PTA Visit Number: 2     Froylan Chacon, PTA  08/11/2020

## 2020-08-11 NOTE — SUBJECTIVE & OBJECTIVE
Subjective:     Interval History:   No acute events overnight. This morning states her back, knee and stomach hurts. Afebrile. Gas and stool in bag. Nauseated overnight. Denies dysuria.     Post-Op Info:  Procedure(s) (LRB):  RESECTION, COLON, LOW ANTERIOR (N/A)  CYSTOSCOPY (N/A)  CATHETERIZATION, URETER (Bilateral)  CREATION, ILEOSTOMY  LYSIS, ADHESIONS   11 Days Post-Op      Medications:  Continuous Infusions:   dextrose 10 % in water (D10W)      hydromorphone in 0.9 % NaCl 6 mg/30 ml      TPN ADULT CENTRAL LINE CUSTOM 75 mL/hr at 08/10/20 2241    TPN ADULT CENTRAL LINE CUSTOM       Scheduled Meds:   enoxaparin  40 mg Subcutaneous Q24H    ertapenem (INVANZ) IVPB  1 g Intravenous Q24H    fat emulsion 20%  250 mL Intravenous Daily    fat emulsion 20%  250 mL Intravenous Daily    ibuprofen  800 mg Intravenous Q6H    [START ON 8/12/2020] insulin aspart U-100  12 Units Subcutaneous Q24H    insulin aspart U-100  12 Units Subcutaneous Q24H    insulin aspart U-100  12 Units Subcutaneous Q24H    insulin aspart U-100  12 Units Subcutaneous Q24H    insulin aspart U-100  12 Units Subcutaneous Q24H    insulin aspart U-100  12 Units Subcutaneous Q24H    lidocaine  1 patch Transdermal Q24H    metoprolol  5 mg Intravenous Q6H    pantoprozole (PROTONIX) IV  40 mg Intravenous Q24H    scopolamine  1 patch Transdermal Q3 Days    sodium chloride 0.9%  10 mL Intravenous Q6H     PRN Meds:   sodium chloride    calcium carbonate    dextrose 10 % in water (D10W)    dextrose 50%    glucagon (human recombinant)    HYDROmorphone    HYDROmorphone    insulin aspart U-100    naloxone    ondansetron    ondansetron    promethazine (PHENERGAN) IVPB    sodium chloride 0.9%    sodium chloride 0.9%    traMADoL        Objective:     Vital Signs (Most Recent):  Temp: 98.3 °F (36.8 °C) (08/11/20 0406)  Pulse: 87 (08/11/20 0652)  Resp: 18 (08/11/20 0406)  BP: (!) 162/77 (08/11/20 0548)  SpO2: 96 % (08/11/20 0406) Vital  Signs (24h Range):  Temp:  [96.8 °F (36 °C)-98.7 °F (37.1 °C)] 98.3 °F (36.8 °C)  Pulse:  [75-91] 87  Resp:  [12-18] 18  SpO2:  [94 %-97 %] 96 %  BP: (127-186)/(63-91) 162/77     Intake/Output - Last 3 Shifts       08/09 0700 - 08/10 0659 08/10 0700 - 08/11 0659 08/11 0700 - 08/12 0659    P.O. 0 0     I.V. (mL/kg) 53 (0.7)      IV Piggyback 1000      TPN 1981.4 927.5     Total Intake(mL/kg) 3034.4 (42.9) 927.5 (13.1)     Urine (mL/kg/hr) 1600 (0.9) 2950 (1.7)     Emesis/NG output 0 0     Drains 60 10     Other 0 100     Stool 175 100     Blood 0 0     Total Output 1835 3160     Net +1199.4 -2232.5            Urine Occurrence 4 x 7 x     Stool Occurrence 0 x 1 x     Emesis Occurrence 0 x 0 x           Physical Exam  Constitutional:       General: She is not in acute distress.  HENT:      Head: Normocephalic.   Eyes:      Conjunctiva/sclera: Conjunctivae normal.   Neck:      Trachea: No tracheal deviation.   Cardiovascular:      Rate and Rhythm: Regular rhythm.   Pulmonary:      Effort: Pulmonary effort is normal. No respiratory distress.   Abdominal:      Comments:   Wound vac applied to inferior portion of incision. Clear yellow output in container. No leaks overnight. Scant amount fibrinous exudate v purulence under plastic of wound vac.    Ostomy pink. Brown liquid stool and gas in bag.   PENNY drain with yellow serous output scant amount purulence in tubing).     Neurological:      Mental Status: She is alert and oriented to person, place, and time.         Significant Labs:  BMP (Last 3 Results):   Recent Labs   Lab 08/09/20  0423 08/10/20  0451 08/11/20  0526   * 199* 137*   * 133* 134*   K 4.4 4.7 4.7    98 98   CO2 28 28 27   BUN 14 15 16   CREATININE 0.8 0.8 0.9   CALCIUM 8.4* 8.6* 9.1   MG 1.8 1.9 1.9     CBC (Last 3 Results):   Recent Labs   Lab 08/09/20  0423 08/10/20  0451 08/11/20  0526   WBC 11.50 13.00* 11.76   RBC 2.88* 2.84* 2.90*   HGB 7.3* 7.2* 7.3*   HCT 23.6* 23.5* 23.8*   PLT  422* 517* 609*   MCV 82 83 82   MCH 25.3* 25.4* 25.2*   MCHC 30.9* 30.6* 30.7*

## 2020-08-11 NOTE — PROGRESS NOTES
[facility]  Colorectal Surgery  Progress Note    Patient Name: Azucnea Morrison  MRN: [id number]  Admission Date: 7/15/2020  Hospital Length of Stay: 27 days  Attending Physician: Fabiana Valiente MD    Subjective:     Interval History:   No acute events overnight. This morning states her back, knee and stomach hurts. Afebrile. Gas and stool in bag. Nauseated overnight. Denies dysuria.     Post-Op Info:  Procedure(s) (LRB):  RESECTION, COLON, LOW ANTERIOR (N/A)  CYSTOSCOPY (N/A)  CATHETERIZATION, URETER (Bilateral)  CREATION, ILEOSTOMY  LYSIS, ADHESIONS   11 Days Post-Op      Medications:  Continuous Infusions:   dextrose 10 % in water (D10W)      hydromorphone in 0.9 % NaCl 6 mg/30 ml      TPN ADULT CENTRAL LINE CUSTOM 75 mL/hr at 08/10/20 2241    TPN ADULT CENTRAL LINE CUSTOM       Scheduled Meds:   enoxaparin  40 mg Subcutaneous Q24H    ertapenem (INVANZ) IVPB  1 g Intravenous Q24H    fat emulsion 20%  250 mL Intravenous Daily    fat emulsion 20%  250 mL Intravenous Daily    ibuprofen  800 mg Intravenous Q6H    [START ON 8/12/2020] insulin aspart U-100  12 Units Subcutaneous Q24H    insulin aspart U-100  12 Units Subcutaneous Q24H    insulin aspart U-100  12 Units Subcutaneous Q24H    insulin aspart U-100  12 Units Subcutaneous Q24H    insulin aspart U-100  12 Units Subcutaneous Q24H    insulin aspart U-100  12 Units Subcutaneous Q24H    lidocaine  1 patch Transdermal Q24H    metoprolol  5 mg Intravenous Q6H    pantoprozole (PROTONIX) IV  40 mg Intravenous Q24H    scopolamine  1 patch Transdermal Q3 Days    sodium chloride 0.9%  10 mL Intravenous Q6H     PRN Meds:   sodium chloride    calcium carbonate    dextrose 10 % in water (D10W)    dextrose 50%    glucagon (human recombinant)    HYDROmorphone    HYDROmorphone    insulin aspart U-100    naloxone    ondansetron    ondansetron    promethazine (PHENERGAN) IVPB    sodium chloride 0.9%    sodium chloride 0.9%     traMADoL        Objective:     Vital Signs (Most Recent):  Temp: 98.3 °F (36.8 °C) (08/11/20 0406)  Pulse: 87 (08/11/20 0652)  Resp: 18 (08/11/20 0406)  BP: (!) 162/77 (08/11/20 0548)  SpO2: 96 % (08/11/20 0406) Vital Signs (24h Range):  Temp:  [96.8 °F (36 °C)-98.7 °F (37.1 °C)] 98.3 °F (36.8 °C)  Pulse:  [75-91] 87  Resp:  [12-18] 18  SpO2:  [94 %-97 %] 96 %  BP: (127-186)/(63-91) 162/77     Intake/Output - Last 3 Shifts       08/09 0700 - 08/10 0659 08/10 0700 - 08/11 0659 08/11 0700 - 08/12 0659    P.O. 0 0     I.V. (mL/kg) 53 (0.7)      IV Piggyback 1000      TPN 1981.4 927.5     Total Intake(mL/kg) 3034.4 (42.9) 927.5 (13.1)     Urine (mL/kg/hr) 1600 (0.9) 2950 (1.7)     Emesis/NG output 0 0     Drains 60 10     Other 0 100     Stool 175 100     Blood 0 0     Total Output 1835 3160     Net +1199.4 -2232.5            Urine Occurrence 4 x 7 x     Stool Occurrence 0 x 1 x     Emesis Occurrence 0 x 0 x           Physical Exam  Constitutional:       General: She is not in acute distress.  HENT:      Head: Normocephalic.   Eyes:      Conjunctiva/sclera: Conjunctivae normal.   Neck:      Trachea: No tracheal deviation.   Cardiovascular:      Rate and Rhythm: Regular rhythm.   Pulmonary:      Effort: Pulmonary effort is normal. No respiratory distress.   Abdominal:      Comments:   Wound vac applied to inferior portion of incision. Clear yellow output in container. No leaks overnight. Scant amount fibrinous exudate v purulence under plastic of wound vac.    Ostomy pink. Brown liquid stool and gas in bag.   PENNY drain with yellow serous output scant amount purulence in tubing).     Neurological:      Mental Status: She is alert and oriented to person, place, and time.         Significant Labs:  BMP (Last 3 Results):   Recent Labs   Lab 08/09/20  0423 08/10/20  0451 08/11/20  0526   * 199* 137*   * 133* 134*   K 4.4 4.7 4.7    98 98   CO2 28 28 27   BUN 14 15 16   CREATININE 0.8 0.8 0.9   CALCIUM  8.4* 8.6* 9.1   MG 1.8 1.9 1.9     CBC (Last 3 Results):   Recent Labs   Lab 08/09/20  0423 08/10/20  0451 08/11/20  0526   WBC 11.50 13.00* 11.76   RBC 2.88* 2.84* 2.90*   HGB 7.3* 7.2* 7.3*   HCT 23.6* 23.5* 23.8*   * 517* 609*   MCV 82 83 82   MCH 25.3* 25.4* 25.2*   MCHC 30.9* 30.6* 30.7*     Assessment/Plan:     * Cutaneous fistula  70 y.o. female 11 Days Post-Op for Procedure(s) (LRB):  RESECTION, COLON, LOW ANTERIOR (N/A)  CYSTOSCOPY (N/A)  CATHETERIZATION, URETER (Bilateral)  CREATION, ILEOSTOMY  LYSIS, ADHESIONS   IR drain placed 8/6: cultures growing Ecoli sensitive to Ertapenem (also has a UTI with the same Ecoli)  Wound vac placed 8/10    NG tube removed 8/9. Continue NPO status.   May change wound vac today to further evaluate the purulence seen under the plastic  Added scopolamine patch   Continue TPN   Follow up PENNY Cr (resent it today)  IV pain meds  SCDs/DVT prophylaxis  GI prophylaxis  Encourage IS  PT/OT. Encourage to chair and ambulation.     Continue communication with use of       On total parenteral nutrition (TPN)  Reorder.    UTI (urinary tract infection)  Cultures growing E. Coli sensitive to ertapenem. First dose given 8/7. Will need at least 7d treatment for complicated UTI and abdominal fluid collection (end date Fri 8/14/)    Acute blood loss anemia  IV ferric gluconate given 8/8. Hb stable this morning at 7.3    Chronic kidney disease, stage 3  Monitor labs  Monitor I&O    Creatinine stable at 0.8. Adequate UOP.    Generalized abdominal pain  Continue IV pain meds      Essential hypertension  Monitor BP  Continue metoprolol 5 mg q6h injections while NPO. Consider restarting home BP meds when diet is able to be advance diet.     Type 2 diabetes mellitus without complication, with long-term current use of insulin  SSI  On scheduled insulin ordered by Endo team.  Accu check Q6    Endocrinology consulted. Appreciate their recommendations.           Pat FINLEY  MD Yue  Colorectal Surgery  [facility]

## 2020-08-11 NOTE — ASSESSMENT & PLAN NOTE
Cultures growing E. Coli sensitive to ertapenem. First dose given 8/7. Will need at least 7d treatment for complicated UTI and abdominal fluid collection (end date Fri 8/14/)

## 2020-08-11 NOTE — PROGRESS NOTES
Per pt's nurse, ileostomy is leaking. Wound care team to reassess.    Received pt sitting up in chair with eyes closed with O2 per NC. 500ml of brownish yellow drainage noted to drainage bag at bedside with leaking noted around wound vac site and ileostomy.     Attempted to reapply convexity urostomy pouch (due to high output of ileostomy) and salvage wound vac seal, but was unsuccessful due to feces from ileostomy leaking into abdominal wound. Clear cardenas drainage noted from midline abdominal wound.  Removed wound vac and applied saline soaked gauze to wound bed with an abd pad and mepore tape to secure. Reapplied 1 piece urostomy pouch to ileostomy with Gustavo ring around peristomal wound area with coloplast paste in used to help create seal. Seal intact to ostomy pouch with pouch applied to drainage bag system.     Wound vac has not been managed by wound care team. Secure chat sent to Dr. Brandy Chaudhry RN, WOCN regarding findings of pt having high out put from her ileostomy (greater than 500 ml coming from her bag in 2 hours) with wet to dry dressing placed to abdominal midline at this time.     Assisted with cleaning pt and reapplying Pure Wick Female Cathter.     Pt's nurse notified that pt's ileostomy pouch had been changed and that the wound vac has been removed at this time and that the wound care team would notify the attending physician. Wound care will continue to follow pt PRN. [id number]

## 2020-08-11 NOTE — PLAN OF CARE
Problem: Diabetes Comorbidity  Goal: Blood Glucose Level Within Desired Range  Outcome: Ongoing, Progressing     Problem: Wound  Goal: Optimal Wound Healing  Outcome: Ongoing, Progressing     Problem: Adult Inpatient Plan of Care  Goal: Plan of Care Review  Outcome: Ongoing, Progressing  Goal: Patient-Specific Goal (Individualization)  Outcome: Ongoing, Progressing  Goal: Absence of Hospital-Acquired Illness or Injury  Outcome: Ongoing, Progressing  Goal: Optimal Comfort and Wellbeing  Outcome: Ongoing, Progressing  Goal: Readiness for Transition of Care  Outcome: Ongoing, Progressing  Goal: Rounds/Family Conference  Outcome: Ongoing, Progressing     Problem: Diabetes Comorbidity  Goal: Blood Glucose Level Within Desired Range  Outcome: Ongoing, Progressing     Problem: Infection  Goal: Infection Symptom Resolution  Outcome: Ongoing, Progressing     Problem: Skin Injury Risk Increased  Goal: Skin Health and Integrity  Outcome: Ongoing, Progressing     Problem: Fall Injury Risk  Goal: Absence of Fall and Fall-Related Injury  Outcome: Ongoing, Progressing     Problem: Hypertension Comorbidity  Goal: Blood Pressure in Desired Range  Outcome: Ongoing, Progressing

## 2020-08-11 NOTE — SUBJECTIVE & OBJECTIVE
"Interval HPI:   Overnight events:  BG is now at lower end of goal. Insulin regimen adjusted per endo fellow overnight to account for BG being below goal.   Diet NPO Except for: Sips with Medication (can have sips of clear liquids), Ice Chips, Medication  TPN ADULT CENTRAL LINE CUSTOM  TPN ADULT CENTRAL LINE CUSTOM  11 Days Post-Op    Eating:   NPO  Nausea: No  Hypoglycemia and intervention: No  Fever: No  TPN and/or TF: No  If yes, type of TF/TPN and rate: None    BP (!) 162/77   Pulse 87   Temp 98.3 °F (36.8 °C) (Oral)   Resp 18   Ht 5' 1" (1.549 m)   Wt 70.7 kg (155 lb 13.8 oz)   LMP  (LMP Unknown)   SpO2 96%   Breastfeeding No   BMI 29.45 kg/m²     Labs Reviewed and Include    Recent Labs   Lab 08/11/20  0526   *   CALCIUM 9.1   ALBUMIN 1.4*   PROT 6.5   *   K 4.7   CO2 27   CL 98   BUN 16   CREATININE 0.9   ALKPHOS 314*   ALT 18   AST 18   BILITOT 0.3     Lab Results   Component Value Date    WBC 11.76 08/11/2020    HGB 7.3 (L) 08/11/2020    HCT 23.8 (L) 08/11/2020    MCV 82 08/11/2020     (H) 08/11/2020     No results for input(s): TSH, FREET4 in the last 168 hours.  Lab Results   Component Value Date    HGBA1C 6.7 (H) 07/09/2020       Nutritional status:   Body mass index is 29.45 kg/m².  Lab Results   Component Value Date    ALBUMIN 1.4 (L) 08/11/2020    ALBUMIN 1.3 (L) 08/10/2020    ALBUMIN 1.3 (L) 08/09/2020     Lab Results   Component Value Date    PREALBUMIN 21 07/15/2020       Estimated Creatinine Clearance: 53.1 mL/min (based on SCr of 0.9 mg/dL).    Accu-Checks  Recent Labs     08/09/20  1618 08/09/20 2020 08/09/20  2347 08/10/20  0447 08/10/20  0819 08/10/20  1242 08/10/20  1632 08/10/20  2122 08/10/20  2359 08/11/20  0516   POCTGLUCOSE 177* 112* 87 219* 146* 137* 183* 84 93 137*       Current Medications and/or Treatments Impacting Glycemic Control  Immunotherapy:    Immunosuppressants     None        Steroids:   Hormones (From admission, onward)    None        Pressors: "    Autonomic Drugs (From admission, onward)    None        Hyperglycemia/Diabetes Medications:   Antihyperglycemics (From admission, onward)    Start     Stop Route Frequency Ordered    08/12/20 0000  insulin aspart U-100 pen 12 Units      -- SubQ Every 24 hours (non-standard times) 08/11/20 0007    08/11/20 2000  insulin aspart U-100 pen 12 Units      -- SubQ Every 24 hours (non-standard times) 08/11/20 0007 08/11/20 1600  insulin aspart U-100 pen 12 Units      -- SubQ Every 24 hours (non-standard times) 08/11/20 0007    08/11/20 1200  insulin aspart U-100 pen 12 Units      -- SubQ Every 24 hours (non-standard times) 08/11/20 0007    08/11/20 0800  insulin aspart U-100 pen 12 Units      -- SubQ Every 24 hours (non-standard times) 08/11/20 0007    08/11/20 0400  insulin aspart U-100 pen 12 Units      -- SubQ Every 24 hours (non-standard times) 08/11/20 0007    08/04/20 1409  insulin aspart U-100 pen 1-10 Units      -- SubQ Every 4 hours PRN 08/04/20 1310

## 2020-08-11 NOTE — PLAN OF CARE
DX:Cutaneous Fistula    Shift Events: Continue to monitor  IR drain, monitor midline incision with staples and wound vac, pain management with PCA pump, accu checks Q 4 hrs ,TPN     Plan of Care:Post surgery management     Neuro:A/O x 4      Respiratory: RA     Cardiac:NS     Diet:TPN and Lipid received pt is npo       Gtts:see mar       Skin:midline incision Left lower quad fistulla

## 2020-08-11 NOTE — PLAN OF CARE
Patient is not medically ready for discharge.      08/11/20 1239   Discharge Reassessment   Assessment Type Discharge Planning Reassessment   Discharge Plan A Home Health   Discharge Plan B Other  (TBD)   DME Needed Upon Discharge  walker, rolling   Anticipated Discharge Disposition Home-Health   Can the patient/caregiver answer the patient profile reliably? Yes, cognitively intact   Rosy Lynch RN, CM   Ext: [phone]

## 2020-08-11 NOTE — PROGRESS NOTES
"[facility]  Endocrinology  Progress Note    Admit Date: 7/15/2020     Reason for Consult: Management of T2DM, Hyperglycemia     Surgical Procedure and Date: n/a    Lab Results   Component Value Date    HGBA1C 6.7 (H) 07/09/2020     Diabetes diagnosis year: 2015    Home Diabetes Medications:  Lantus 10 units daily if am BG >180 and Metformin 1g BID    How often checking glucose at home? once daily   BG readings on regimen: 150-low 200s  Hypoglycemia on the regimen?  No  Missed doses on regimen?  No    Diabetes Complications include:     Hyperglycemia    Complicating diabetes co morbidities:   CKD      HPI:   Patient is a 70 y.o. female with a diagnosis of T2DM, HTN, and complicated surgical history, including h/o colon resection and Aiyana procedure, takedown colostomy, colostomy closure, clot of colo cutaneous fistula. She presnts with abdominal pain and vomiting. 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 CT scan from OSH showed partially obstructed small bowel. She was admitted to [facility]. Endocrinology consulted for management of T2DM. Of note, patient's primary language is Greek.  An  was used for this consult.       Interval HPI:   Overnight events:  BG is now at lower end of goal. Insulin regimen adjusted per endo fellow overnight to account for BG being below goal.   Diet NPO Except for: Sips with Medication (can have sips of clear liquids), Ice Chips, Medication  TPN ADULT CENTRAL LINE CUSTOM  TPN ADULT CENTRAL LINE CUSTOM  11 Days Post-Op    Eating:   NPO  Nausea: No  Hypoglycemia and intervention: No  Fever: No  TPN and/or TF: No  If yes, type of TF/TPN and rate: None    BP (!) 162/77   Pulse 87   Temp 98.3 °F (36.8 °C) (Oral)   Resp 18   Ht 5' 1" (1.549 m)   Wt 70.7 kg (155 lb 13.8 oz)   LMP  (LMP Unknown)   SpO2 96%   Breastfeeding No   BMI 29.45 kg/m²     Labs Reviewed and Include    Recent Labs   Lab " 08/11/20  0526   *   CALCIUM 9.1   ALBUMIN 1.4*   PROT 6.5   *   K 4.7   CO2 27   CL 98   BUN 16   CREATININE 0.9   ALKPHOS 314*   ALT 18   AST 18   BILITOT 0.3     Lab Results   Component Value Date    WBC 11.76 08/11/2020    HGB 7.3 (L) 08/11/2020    HCT 23.8 (L) 08/11/2020    MCV 82 08/11/2020     (H) 08/11/2020     No results for input(s): TSH, FREET4 in the last 168 hours.  Lab Results   Component Value Date    HGBA1C 6.7 (H) 07/09/2020       Nutritional status:   Body mass index is 29.45 kg/m².  Lab Results   Component Value Date    ALBUMIN 1.4 (L) 08/11/2020    ALBUMIN 1.3 (L) 08/10/2020    ALBUMIN 1.3 (L) 08/09/2020     Lab Results   Component Value Date    PREALBUMIN 21 07/15/2020       Estimated Creatinine Clearance: 53.1 mL/min (based on SCr of 0.9 mg/dL).    Accu-Checks  Recent Labs     08/09/20  1618 08/09/20  2020 08/09/20  2347 08/10/20  0447 08/10/20  0819 08/10/20  1242 08/10/20  1632 08/10/20  2122 08/10/20  2359 08/11/20  0516   POCTGLUCOSE 177* 112* 87 219* 146* 137* 183* 84 93 137*       Current Medications and/or Treatments Impacting Glycemic Control  Immunotherapy:    Immunosuppressants     None        Steroids:   Hormones (From admission, onward)    None        Pressors:    Autonomic Drugs (From admission, onward)    None        Hyperglycemia/Diabetes Medications:   Antihyperglycemics (From admission, onward)    Start     Stop Route Frequency Ordered    08/12/20 0000  insulin aspart U-100 pen 12 Units      -- SubQ Every 24 hours (non-standard times) 08/11/20 0007    08/11/20 2000  insulin aspart U-100 pen 12 Units      -- SubQ Every 24 hours (non-standard times) 08/11/20 0007    08/11/20 1600  insulin aspart U-100 pen 12 Units      -- SubQ Every 24 hours (non-standard times) 08/11/20 0007    08/11/20 1200  insulin aspart U-100 pen 12 Units      -- SubQ Every 24 hours (non-standard times) 08/11/20 0007    08/11/20 0800  insulin aspart U-100 pen 12 Units      -- SubQ Every  24 hours (non-standard times) 08/11/20 0007    08/11/20 0400  insulin aspart U-100 pen 12 Units      -- SubQ Every 24 hours (non-standard times) 08/11/20 0007    08/04/20 1409  insulin aspart U-100 pen 1-10 Units      -- SubQ Every 4 hours PRN 08/04/20 1310          ASSESSMENT and PLAN    * Cutaneous fistula  Managed per primary team  Optimize BG control to improve wound healing        Type 2 diabetes mellitus without complication, with long-term current use of insulin  BG goal 140-180    -  Novolog to 12 units q 4 hrs while on TPN. HOLD if TPN is stopped for any reason.   - Continue Moderate Dose Correction Scale PRN BG excursions.   - BG monitoring q 4 hrs while on TPN (coordinate with meals during the day)     ** Please call Endocrine for any BG related issues **  ** Please call Endocrine if TPN is restarted or diet changes **    Discharge plans:  TBD. Please notify endocrinology prior to discharge.       On total parenteral nutrition (TPN)  May elevate BG readings  May increase insulin requirements            Medardo Godoy NP  Endocrinology  [facility]

## 2020-08-11 NOTE — ASSESSMENT & PLAN NOTE
70 y.o. female 11 Days Post-Op for Procedure(s) (LRB):  RESECTION, COLON, LOW ANTERIOR (N/A)  CYSTOSCOPY (N/A)  CATHETERIZATION, URETER (Bilateral)  CREATION, ILEOSTOMY  LYSIS, ADHESIONS   IR drain placed 8/6: cultures growing Ecoli sensitive to Ertapenem (also has a UTI with the same Ecoli)  Wound vac placed 8/10    NG tube removed 8/9. Continue NPO status.   May change wound vac today to further evaluate the purulence seen under the plastic  Added scopolamine patch   Continue TPN   Follow up PENNY Cr (resent it today)  IV pain meds  SCDs/DVT prophylaxis  GI prophylaxis  Encourage IS  PT/OT. Encourage to chair and ambulation.     Continue communication with use of

## 2020-08-11 NOTE — PROGRESS NOTES
Pt's nurse called regarding ileostomy pouch leaking.    Received pt awake and alert lying in bed with tegafilm in use to help secure ostomy pouch with wound vac intact with good seal at 125mmHg,    Changed ileostomy pouch by applying 1 piece convexity urostomy pouch system with Gustavo Ring applied and attaching it to drainage bag at bedside. Secured edgles of ostomy pouch with Comfeel hydrocolloid. Additional supplies were left at bedside and 200ml of thin brown liquid stool had been emptied from pt's ostomy pouch prior to removal.     Pt tolerated bag change without difficulty and pt's nurse is aware of care given. Wound care will continue to follow pt PRN. [id number]

## 2020-08-12 LAB
ALBUMIN SERPL BCP-MCNC: 1.5 G/DL (ref 3.5–5.2)
ALP SERPL-CCNC: 297 U/L (ref 55–135)
ALT SERPL W/O P-5'-P-CCNC: 19 U/L (ref 10–44)
ANION GAP SERPL CALC-SCNC: 8 MMOL/L (ref 8–16)
AST SERPL-CCNC: 19 U/L (ref 10–40)
BASOPHILS # BLD AUTO: 0.02 K/UL (ref 0–0.2)
BASOPHILS NFR BLD: 0.2 % (ref 0–1.9)
BILIRUB SERPL-MCNC: 0.3 MG/DL (ref 0.1–1)
BUN SERPL-MCNC: 15 MG/DL (ref 8–23)
CALCIUM SERPL-MCNC: 8.9 MG/DL (ref 8.7–10.5)
CHLORIDE SERPL-SCNC: 103 MMOL/L (ref 95–110)
CO2 SERPL-SCNC: 26 MMOL/L (ref 23–29)
CREAT FLD-MCNC: 0.8 MG/DL
CREAT SERPL-MCNC: 0.9 MG/DL (ref 0.5–1.4)
CRP SERPL-MCNC: 222.9 MG/L (ref 0–8.2)
DIFFERENTIAL METHOD: ABNORMAL
EOSINOPHIL # BLD AUTO: 0.3 K/UL (ref 0–0.5)
EOSINOPHIL NFR BLD: 2.9 % (ref 0–8)
ERYTHROCYTE [DISTWIDTH] IN BLOOD BY AUTOMATED COUNT: 18.3 % (ref 11.5–14.5)
EST. GFR  (AFRICAN AMERICAN): >60 ML/MIN/1.73 M^2
EST. GFR  (NON AFRICAN AMERICAN): >60 ML/MIN/1.73 M^2
GLUCOSE SERPL-MCNC: 183 MG/DL (ref 70–110)
HCT VFR BLD AUTO: 23.2 % (ref 37–48.5)
HGB BLD-MCNC: 7.2 G/DL (ref 12–16)
IMM GRANULOCYTES # BLD AUTO: 0.08 K/UL (ref 0–0.04)
IMM GRANULOCYTES NFR BLD AUTO: 0.8 % (ref 0–0.5)
LYMPHOCYTES # BLD AUTO: 1 K/UL (ref 1–4.8)
LYMPHOCYTES NFR BLD: 9.5 % (ref 18–48)
MAGNESIUM SERPL-MCNC: 1.9 MG/DL (ref 1.6–2.6)
MCH RBC QN AUTO: 25.6 PG (ref 27–31)
MCHC RBC AUTO-ENTMCNC: 31 G/DL (ref 32–36)
MCV RBC AUTO: 83 FL (ref 82–98)
MONOCYTES # BLD AUTO: 0.9 K/UL (ref 0.3–1)
MONOCYTES NFR BLD: 8.7 % (ref 4–15)
NEUTROPHILS # BLD AUTO: 8.1 K/UL (ref 1.8–7.7)
NEUTROPHILS NFR BLD: 77.9 % (ref 38–73)
NRBC BLD-RTO: 0 /100 WBC
PHOSPHATE SERPL-MCNC: 5 MG/DL (ref 2.7–4.5)
PLATELET # BLD AUTO: 677 K/UL (ref 150–350)
PMV BLD AUTO: 8.8 FL (ref 9.2–12.9)
POCT GLUCOSE: 156 MG/DL (ref 70–110)
POCT GLUCOSE: 161 MG/DL (ref 70–110)
POCT GLUCOSE: 178 MG/DL (ref 70–110)
POCT GLUCOSE: 198 MG/DL (ref 70–110)
POCT GLUCOSE: 212 MG/DL (ref 70–110)
POCT GLUCOSE: 69 MG/DL (ref 70–110)
POCT GLUCOSE: 72 MG/DL (ref 70–110)
POCT GLUCOSE: 80 MG/DL (ref 70–110)
POTASSIUM SERPL-SCNC: 4.1 MMOL/L (ref 3.5–5.1)
PROT SERPL-MCNC: 6.7 G/DL (ref 6–8.4)
RBC # BLD AUTO: 2.81 M/UL (ref 4–5.4)
SODIUM SERPL-SCNC: 137 MMOL/L (ref 136–145)
SPECIMEN SOURCE: NORMAL
WBC # BLD AUTO: 10.36 K/UL (ref 3.9–12.7)

## 2020-08-12 PROCEDURE — 25500020 PHARM REV CODE 255: Performed by: COLON & RECTAL SURGERY

## 2020-08-12 PROCEDURE — 94761 N-INVAS EAR/PLS OXIMETRY MLT: CPT

## 2020-08-12 PROCEDURE — 63600175 PHARM REV CODE 636 W HCPCS: Performed by: STUDENT IN AN ORGANIZED HEALTH CARE EDUCATION/TRAINING PROGRAM

## 2020-08-12 PROCEDURE — 25000003 PHARM REV CODE 250: Performed by: STUDENT IN AN ORGANIZED HEALTH CARE EDUCATION/TRAINING PROGRAM

## 2020-08-12 PROCEDURE — 80053 COMPREHEN METABOLIC PANEL: CPT

## 2020-08-12 PROCEDURE — 83735 ASSAY OF MAGNESIUM: CPT

## 2020-08-12 PROCEDURE — 84100 ASSAY OF PHOSPHORUS: CPT

## 2020-08-12 PROCEDURE — 85025 COMPLETE CBC W/AUTO DIFF WBC: CPT

## 2020-08-12 PROCEDURE — 94799 UNLISTED PULMONARY SVC/PX: CPT

## 2020-08-12 PROCEDURE — 99900035 HC TECH TIME PER 15 MIN (STAT)

## 2020-08-12 PROCEDURE — B4185 PARENTERAL SOL 10 GM LIPIDS: HCPCS | Performed by: STUDENT IN AN ORGANIZED HEALTH CARE EDUCATION/TRAINING PROGRAM

## 2020-08-12 PROCEDURE — A4216 STERILE WATER/SALINE, 10 ML: HCPCS | Performed by: STUDENT IN AN ORGANIZED HEALTH CARE EDUCATION/TRAINING PROGRAM

## 2020-08-12 PROCEDURE — C9113 INJ PANTOPRAZOLE SODIUM, VIA: HCPCS | Performed by: STUDENT IN AN ORGANIZED HEALTH CARE EDUCATION/TRAINING PROGRAM

## 2020-08-12 PROCEDURE — A4217 STERILE WATER/SALINE, 500 ML: HCPCS | Performed by: STUDENT IN AN ORGANIZED HEALTH CARE EDUCATION/TRAINING PROGRAM

## 2020-08-12 PROCEDURE — 94770 HC EXHALED C02 TEST: CPT

## 2020-08-12 PROCEDURE — 20600001 HC STEP DOWN PRIVATE ROOM

## 2020-08-12 PROCEDURE — 86140 C-REACTIVE PROTEIN: CPT

## 2020-08-12 RX ORDER — INSULIN ASPART 100 [IU]/ML
14 INJECTION, SOLUTION INTRAVENOUS; SUBCUTANEOUS
Status: DISCONTINUED | OUTPATIENT
Start: 2020-08-13 | End: 2020-08-12

## 2020-08-12 RX ORDER — INSULIN ASPART 100 [IU]/ML
8 INJECTION, SOLUTION INTRAVENOUS; SUBCUTANEOUS
Status: DISCONTINUED | OUTPATIENT
Start: 2020-08-13 | End: 2020-08-13

## 2020-08-12 RX ORDER — INSULIN ASPART 100 [IU]/ML
14 INJECTION, SOLUTION INTRAVENOUS; SUBCUTANEOUS
Status: DISCONTINUED | OUTPATIENT
Start: 2020-08-12 | End: 2020-08-12

## 2020-08-12 RX ORDER — FENTANYL CITRATE 50 UG/ML
INJECTION, SOLUTION INTRAMUSCULAR; INTRAVENOUS CODE/TRAUMA/SEDATION MEDICATION
Status: COMPLETED | OUTPATIENT
Start: 2020-08-12 | End: 2020-08-12

## 2020-08-12 RX ORDER — MIDAZOLAM HYDROCHLORIDE 1 MG/ML
INJECTION INTRAMUSCULAR; INTRAVENOUS CODE/TRAUMA/SEDATION MEDICATION
Status: COMPLETED | OUTPATIENT
Start: 2020-08-12 | End: 2020-08-12

## 2020-08-12 RX ORDER — INSULIN ASPART 100 [IU]/ML
8 INJECTION, SOLUTION INTRAVENOUS; SUBCUTANEOUS
Status: DISCONTINUED | OUTPATIENT
Start: 2020-08-12 | End: 2020-08-13

## 2020-08-12 RX ADMIN — Medication 10 ML: at 05:08

## 2020-08-12 RX ADMIN — METOPROLOL TARTRATE 5 MG: 5 INJECTION, SOLUTION INTRAVENOUS at 12:08

## 2020-08-12 RX ADMIN — INSULIN ASPART 14 UNITS: 100 INJECTION, SOLUTION INTRAVENOUS; SUBCUTANEOUS at 12:08

## 2020-08-12 RX ADMIN — Medication: at 11:08

## 2020-08-12 RX ADMIN — FENTANYL CITRATE 75 MCG: 50 INJECTION, SOLUTION INTRAMUSCULAR; INTRAVENOUS at 06:08

## 2020-08-12 RX ADMIN — INSULIN ASPART 2 UNITS: 100 INJECTION, SOLUTION INTRAVENOUS; SUBCUTANEOUS at 09:08

## 2020-08-12 RX ADMIN — ERTAPENEM 1 G: 1 INJECTION INTRAMUSCULAR; INTRAVENOUS at 10:08

## 2020-08-12 RX ADMIN — IBUPROFEN 800 MG: 800 INJECTION INTRAVENOUS at 12:08

## 2020-08-12 RX ADMIN — TRAMADOL HYDROCHLORIDE 50 MG: 50 TABLET, FILM COATED ORAL at 10:08

## 2020-08-12 RX ADMIN — ENOXAPARIN SODIUM 40 MG: 40 INJECTION SUBCUTANEOUS at 08:08

## 2020-08-12 RX ADMIN — INSULIN ASPART 12 UNITS: 100 INJECTION, SOLUTION INTRAVENOUS; SUBCUTANEOUS at 04:08

## 2020-08-12 RX ADMIN — IBUPROFEN 800 MG: 800 INJECTION INTRAVENOUS at 06:08

## 2020-08-12 RX ADMIN — INSULIN ASPART 2 UNITS: 100 INJECTION, SOLUTION INTRAVENOUS; SUBCUTANEOUS at 12:08

## 2020-08-12 RX ADMIN — METOPROLOL TARTRATE 5 MG: 5 INJECTION, SOLUTION INTRAVENOUS at 05:08

## 2020-08-12 RX ADMIN — METOPROLOL TARTRATE 5 MG: 5 INJECTION, SOLUTION INTRAVENOUS at 08:08

## 2020-08-12 RX ADMIN — Medication: at 12:08

## 2020-08-12 RX ADMIN — Medication 10 ML: at 06:08

## 2020-08-12 RX ADMIN — Medication 10 ML: at 12:08

## 2020-08-12 RX ADMIN — INSULIN ASPART 4 UNITS: 100 INJECTION, SOLUTION INTRAVENOUS; SUBCUTANEOUS at 04:08

## 2020-08-12 RX ADMIN — LIDOCAINE 1 PATCH: 50 PATCH TOPICAL at 08:08

## 2020-08-12 RX ADMIN — IBUPROFEN 800 MG: 800 INJECTION INTRAVENOUS at 08:08

## 2020-08-12 RX ADMIN — INSULIN ASPART 12 UNITS: 100 INJECTION, SOLUTION INTRAVENOUS; SUBCUTANEOUS at 12:08

## 2020-08-12 RX ADMIN — MIDAZOLAM HYDROCHLORIDE 1.5 MG: 1 INJECTION, SOLUTION INTRAMUSCULAR; INTRAVENOUS at 06:08

## 2020-08-12 RX ADMIN — INSULIN ASPART 8 UNITS: 100 INJECTION, SOLUTION INTRAVENOUS; SUBCUTANEOUS at 08:08

## 2020-08-12 RX ADMIN — PANTOPRAZOLE SODIUM 40 MG: 40 INJECTION, POWDER, LYOPHILIZED, FOR SOLUTION INTRAVENOUS at 09:08

## 2020-08-12 RX ADMIN — IOHEXOL 10 ML: 300 INJECTION, SOLUTION INTRAVENOUS at 06:08

## 2020-08-12 RX ADMIN — HYDROMORPHONE HYDROCHLORIDE 0.5 MG: 1 INJECTION, SOLUTION INTRAMUSCULAR; INTRAVENOUS; SUBCUTANEOUS at 04:08

## 2020-08-12 RX ADMIN — Medication 10 ML: at 11:08

## 2020-08-12 RX ADMIN — METOPROLOL TARTRATE 5 MG: 5 INJECTION, SOLUTION INTRAVENOUS at 11:08

## 2020-08-12 RX ADMIN — INSULIN ASPART 12 UNITS: 100 INJECTION, SOLUTION INTRAVENOUS; SUBCUTANEOUS at 09:08

## 2020-08-12 RX ADMIN — I.V. FAT EMULSION 250 ML: 20 EMULSION INTRAVENOUS at 11:08

## 2020-08-12 RX ADMIN — ONDANSETRON 4 MG: 2 INJECTION INTRAMUSCULAR; INTRAVENOUS at 12:08

## 2020-08-12 RX ADMIN — MAGNESIUM SULFATE HEPTAHYDRATE: 500 INJECTION, SOLUTION INTRAMUSCULAR; INTRAVENOUS at 11:08

## 2020-08-12 RX ADMIN — INSULIN ASPART 1 UNITS: 100 INJECTION, SOLUTION INTRAVENOUS; SUBCUTANEOUS at 12:08

## 2020-08-12 NOTE — NURSING
Wounds to ABD X3 cleansed with NS and redressed X3. Cleaned and reinforced ostomy bag to prevent leaking per reported by day shift nurse.  Will cont to manage POC.

## 2020-08-12 NOTE — PLAN OF CARE
Reviewed POC with PT, stated understanding, AOX4, VSS.     X3 ABD DSG CD&I at this time, changed X1 this shift. Ostomy to R has small amount of leaking after reinforcement X1 this shift, but mostly intact, output draining to gravity bag. IR drain WDL.     TPN and PCA infusing per order, patient reports relief with PCA. X1 dose nausea meds with relief reported.       PT incont at HS, PT turned per order, skin intact.     NO adverse events this shift, bed low, call light in reach, will cont to manage POC.

## 2020-08-12 NOTE — PROGRESS NOTES
Follow up for ileostomy.    Received pt lying in bed awake and alert with pt's nurse at bedside. Wound vac remains off at this time. Discussed with nurse that pt had experienced high output from her ileostomy yesterday and that the wound vac seal was compromised due to the excessive amount of output she was having.    Upon assessment of RLQ ileostomy: pouch intact at this time and connected to drainage bag system with thin brown liquid drainage noted with wet to dry dressing clean, dry, and intact to midline incision.Left pouch and midline dressing intact at this time.    Chart reflects that Interventional Radiology has been consulted for upsizing percutaneous drain due to decreased output from drain. Will bring additional supplies this afternoon and check on pt again later this afternoon.    Wound care team will continue to follow pt PRN. [id number]

## 2020-08-12 NOTE — PROGRESS NOTES
"[facility]  Adult Nutrition  Progress Note    SUMMARY       Recommendations    Pt meets criteria for acute moderate malnutrition.      1. Current TPN exceeding EEN.               Recommend Custom TPN 90 gm AA / 215 gm Dex + IV lipids to provide 1591 kcal, 90 gm protein, and GIR of 2.11.      2. As medically able, ADAT to diabetic diet texture per SLP.               - Continue Boost Plus and Boost Breeze.      3. RD following.     Goals: pt to tolerate >85% of EEN/EPN by RD follow up  Nutrition Goal Status: goal met  Communication of RD Recs: (POC)    Reason for Assessment    Reason For Assessment: RD follow-up  Diagnosis: (cutaneous fistula)  Relevant Medical History: DM; HTN; CKD3  Interdisciplinary Rounds: did not attend  General Information Comments: S/p colon resection with ileostomy, now with wound vac. NG tube removed 8/9. Pt continues to be NPO, TPN + IV lipids infusing. Noted no additional wt loss. NFPE completed 7/17. Pt continues with mild-moderate muscle/fat wasting. Pt meets criteria for moderate malnutrition.   Nutrition Discharge Planning: Unable to determine    Nutrition Risk Screen    Nutrition Risk Screen: tube feeding or parenteral nutrition    Nutrition/Diet History    Patient Reported Diet/Restrictions/Preferences: general  Spiritual, Cultural Beliefs, Gnosticist Practices, Values that Affect Care: no  Food Allergies: NKFA  Factors Affecting Nutritional Intake: altered gastrointestinal function, NPO    Anthropometrics    Temp: 97.9 °F (36.6 °C)  Height Method: Stated  Height: 5' 1" (154.9 cm)  Height (inches): 61 in  Weight Method: Bed Scale  Weight: 70.7 kg (155 lb 13.8 oz)  Weight (lb): 155.87 lb  Ideal Body Weight (IBW), Female: 105 lb  % Ideal Body Weight, Female (lb): 148.45 %  BMI (Calculated): 29.5  BMI Grade: 25 - 29.9 - overweight     Lab/Procedures/Meds    Pertinent Labs Reviewed: reviewed  Pertinent Labs Comments: glucose 183, phos 5, alk phos 297, CRP " 222.9  Pertinent Medications Reviewed: reviewed  Pertinent Medications Comments: insulin, IVF    Estimated/Assessed Needs    Weight Used For Calorie Calculations: 70.7 kg (155 lb 13.8 oz)  Energy Calorie Requirements (kcal): 1461 kcal/day  Energy Need Method: [place]-St Jeor(x 1.25)  Protein Requirements: 85-99 g/day  Weight Used For Protein Calculations: 70.7 kg (155 lb 13.8 oz)  Fluid Requirements (mL): 1 mL/kcal or per MD     RDA Method (mL): 1461  CHO Requirement: 175    Nutrition Prescription Ordered    Current Diet Order: NPO  Current Nutrition Support Formula Ordered: (Custom TPN 90 gm AA / 270 gm Dex + IV lipids)  Current Nutrition Support Rate Ordered: 75 (ml)  Current Nutrition Support Frequency Ordered: mL/hr  Oral Nutrition Supplement: Boost Plus, Boost Breeze    Evaluation of Received Nutrient/Fluid Intake    Parenteral Calories (kcal): 1278  Parenteral Protein (gm): 90  Parenteral Fluid (mL): 1800  Lipid Calories (kcals): 500 kcals  GIR (Glucose Infusion Rate) (mg/kg/min): 2.65 mg/kg/min  Total Calories (kcal): 1778  % Kcal Needs: 122  % Protein Needs: 100%  Energy Calories Required: exceeds needs  Protein Required: meeting needs  Fluid Required: (per MD)  Tolerance: tolerating  % Intake of Estimated Energy Needs: 75 - 100 %  % Meal Intake: NPO    Nutrition Risk    Level of Risk/Frequency of Follow-up: (1x/week)     Assessment and Plan    Moderate Protein-Calorie Malnutrition  Malnutrition in the context of Acute Illness/Injury     Related to (etiology):  Decreased intake      Signs and Symptoms (as evidenced by):  Energy Intake: <75% of estimated energy requirement for >1 week  Body Fat Depletion: mild depletion of triceps   Muscle Mass Depletion: mild and moderate depletion of temples, clavicle region, interosseous muscle and lower extremities   Weight Loss: 6% x unsure of time frame per pt      Interventions (treatment strategy):  Collaboration of care with other  providers  Parenteral Nutrition     Nutrition Diagnosis Status:  Improving     Monitor and Evaluation    Food and Nutrient Intake: energy intake, food and beverage intake, parenteral nutrition intake  Food and Nutrient Adminstration: diet order, enteral and parenteral nutrition administration  Physical Activity and Function: nutrition-related ADLs and IADLs  Anthropometric Measurements: weight, weight change  Biochemical Data, Medical Tests and Procedures: electrolyte and renal panel, gastrointestinal profile, glucose/endocrine profile, lipid profile, inflammatory profile  Nutrition-Focused Physical Findings: overall appearance     Malnutrition Assessment  Malnutrition Type: acute illness or injury          Weight Loss (Malnutrition): (6% unsure of time frame)  Energy Intake (Malnutrition): less than 75% for greater than 7 days  Subcutaneous Fat (Malnutrition): moderate depletion  Muscle Mass (Malnutrition): moderate depletion   Orbital Region (Subcutaneous Fat Loss): mild depletion  Upper Arm Region (Subcutaneous Fat Loss): moderate depletion   Sikhism Region (Muscle Loss): mild depletion  Clavicle Bone Region (Muscle Loss): mild depletion  Clavicle and Acromion Bone Region (Muscle Loss): mild depletion  Dorsal Hand (Muscle Loss): mild depletion  Anterior Thigh Region (Muscle Loss): moderate depletion  Posterior Calf Region (Muscle Loss): moderate depletion   Edema (Fluid Accumulation): 0-->no edema present             Nutrition Follow-Up    RD Follow-up?: Yes

## 2020-08-12 NOTE — ASSESSMENT & PLAN NOTE
70 y.o. female 12 Days Post-Op for Procedure(s) (LRB):  RESECTION, COLON, LOW ANTERIOR (N/A)  CYSTOSCOPY (N/A)  CATHETERIZATION, URETER (Bilateral)  CREATION, ILEOSTOMY  LYSIS, ADHESIONS   IR drain placed 8/6: cultures growing Ecoli sensitive to Ertapenem (also has a UTI with the same Ecoli)  Wound vac placed 8/10, removed 8/11  Will consult ID today for ertapenem continuation.  IR consulted for drain replacement v upsize v additional drain in enlarged collection seen on CT  Try clear liquids today after IR procedure.  May change wound vac today to further evaluate the purulence seen under the plastic  Added scopolamine patch   Continue TPN   Follow up PENNY Cr   IV pain meds  SCDs/DVT prophylaxis  GI prophylaxis  Encourage IS  PT/OT. Encourage to chair and ambulation.     Continue communication with use of

## 2020-08-12 NOTE — PLAN OF CARE
A&Ox4. VVS on RA. Adequate UOP per bedpan and incontinence pad. Q2 turns maintained. NPO maintained, tolerated well. Pain managed with PCA pump.  Colostomy bag changed today. TPN maintained @75. IR drain right abdomen tolerated well. Q4 accuchecks maintained. 4pm accucheck 72, held insulin per MD order, recheck at 8pm per MD. Pt at IR for procedure to midline abdomen wound. Diet order for clear liquids upon return per MD at bedside. Call endocrine when pt returns and clear liquid diet is started for insulin adjustment.  Pt currently @ IR. [facility].       Problem: Diabetes Comorbidity  Goal: Blood Glucose Level Within Desired Range  Outcome: Ongoing, Progressing     Problem: Wound  Goal: Optimal Wound Healing  Outcome: Ongoing, Progressing     Problem: Adult Inpatient Plan of Care  Goal: Plan of Care Review  Outcome: Ongoing, Progressing  Goal: Patient-Specific Goal (Individualization)  Outcome: Ongoing, Progressing  Goal: Absence of Hospital-Acquired Illness or Injury  Outcome: Ongoing, Progressing  Goal: Optimal Comfort and Wellbeing  Outcome: Ongoing, Progressing  Goal: Readiness for Transition of Care  Outcome: Ongoing, Progressing  Goal: Rounds/Family Conference  Outcome: Ongoing, Progressing     Problem: Diabetes Comorbidity  Goal: Blood Glucose Level Within Desired Range  Outcome: Ongoing, Progressing     Problem: Infection  Goal: Infection Symptom Resolution  Outcome: Ongoing, Progressing     Problem: Skin Injury Risk Increased  Goal: Skin Health and Integrity  Outcome: Ongoing, Progressing     Problem: Fall Injury Risk  Goal: Absence of Fall and Fall-Related Injury  Outcome: Ongoing, Progressing     Problem: Hypertension Comorbidity  Goal: Blood Pressure in Desired Range  Outcome: Ongoing, Progressing

## 2020-08-12 NOTE — H&P
Vascular and Interventional Radiology History & Physical    Date:  2020    History of Present Illness:  Azucena Morrison is a 70 y.o. female with history of DM, HTN, and complicated surgical history including recent LAR. Recent CT with anterior abdominal fluid collection. IR placed percutaneous drainage on . Pt now complaining of decreased output from drain. IR was reconsulted for upsizing percutaneous drain.     Past Medical History:  Past Medical History:   Diagnosis Date    Chronic kidney disease, stage 3     Diabetes mellitus     Diabetes mellitus, type 2     Hypertension     UTI (urinary tract infection) 2020       Past Surgical History:  Past Surgical History:   Procedure Laterality Date    CATHETERIZATION OF URETER Bilateral 2020    Procedure: CATHETERIZATION, URETER;  Surgeon: Kvng Cunningham MD;  Location: [facility] OR [address] [place] [address];  Service: Urology;  Laterality: Bilateral;     SECTION, CLASSIC      x2    CHOLECYSTECTOMY      COLON SURGERY      [facility]    COLONOSCOPY N/A 2018    Procedure: COLONOSCOPY;  Surgeon: Gibran Aguayo MD;  Location: [facility];  Service: Endoscopy;  Laterality: N/A;    COLOSTOMY Left     CYSTOSCOPY N/A 2020    Procedure: CYSTOSCOPY;  Surgeon: Kvng Cunningham MD;  [facility];  Service: Urology;  Laterality: N/A;    CYSTOSCOPY WITH URETEROSCOPY, RETROGRADE PYELOGRAPHY, AND INSERTION OF STENT Left 2019    Procedure: CYSTOSCOPY, WITH RETROGRADE PYELOGRAM AND URETERAL STENT INSERTION with placement of a muir cath;  Surgeon: Ulisses Horton MD;  Location: [facility] OR;  Service: General;  Laterality: Left;    ILEOSTOMY  2020    Procedure: CREATION, ILEOSTOMY;  Surgeon: Fabiana Valiente MD;  [facility];  Service: Colon and Rectal;;    INCISION AND DRAINAGE OF ABSCESS N/A 2019    Procedure: INCISION AND DRAINAGE, ABSCESS;  Surgeon: Ulisses Horton MD;  Location: [facility] OR;   Service: General;  Laterality: N/A;    INCISION OF ABDOMINAL WALL N/A 8/10/2018    Procedure: INCISION, ABDOMINAL WALL;  Surgeon: Ulisses Horton MD;  Location: [facility];  Service: General;  Laterality: N/A;    INCISIONAL HERNIA REPAIR Right 2010    LOW ANTERIOR RESECTION OF COLON N/A 2020    Procedure: RESECTION, COLON, LOW ANTERIOR;  Surgeon: Fabiana Valiente MD;  [facility];  Service: Colon and Rectal;  Laterality: N/A;    LYSIS OF ADHESIONS  2020    Procedure: LYSIS, ADHESIONS;  Surgeon: Fabiana Valiente MD;  Location: [facility] OR 2ND FLR;  Service: Colon and Rectal;;        Sedation History:    Denies any adverse reactions.  Denies problems laying flat.    Social History:  Social History     Tobacco Use    Smoking status: Former Smoker     Types: Cigarettes     Quit date: 2000     Years since quittin.6    Smokeless tobacco: Never Used   Substance Use Topics    Alcohol use: No    Drug use: No        Home Medications:   Prior to Admission medications    Medication Sig Start Date End Date Taking? Authorizing Provider   carvedilol (COREG) 25 MG tablet Take 1 tablet (25 mg total) by mouth once daily. 19  Yes    acetaminophen (TYLENOL) 325 MG tablet Take 2 tablets (650 mg total) by mouth every 8 (eight) hours as needed. 16   Ulisses Horton MD   betamethasone valerate 0.1% (VALISONE) 0.1 % Crea Apply around wound as needed daily for itching. 4/10/19   Ulisses Horton MD   blood sugar diagnostic Strp Use to test blood sugar twice daily as directed. 20   Ulisses Horton MD   blood-glucose meter Misc Use as directed to test blood sugar twice daily 20   Ulisses Horton MD   clotrimazole-betamethasone 1-0.05% (LOTRISONE) cream Apply topically locally as directed 20   Ulisses Horton MD   dicyclomine (BENTYL) 10 MG capsule TAKE 1 CAPSULE BY MOUTH THREE TIMES A DAY 20      gentamicin (GARAMYCIN) 0.1 % ointment Apply  "topically to affected area daily 5/28/20   Ulisses Horton MD   hydroCHLOROthiazide (HYDRODIURIL) 25 MG tablet Take 1 tablet (25 mg total) by mouth once daily. 8/30/19   Pj Sanches MD   HYDROcodone-acetaminophen (NORCO) 5-325 mg per tablet Take 1 tablet by mouth every 4 (four) hours as needed. 7/3/20   Ulisses Horton MD   insulin glargine 100 units/mL (3mL) SubQ pen Inject subcutaneously into the skin 10 units every morning. 1/9/19      lancets 30 gauge Misc Use to test blood sugar twice daily. 4/22/20   Ulisses Horton MD   metFORMIN (GLUCOPHAGE) 1000 MG tablet Take 1 tablet (1,000 mg total) by mouth 2 (two) times daily. 1/9/19      metoclopramide HCl (REGLAN) 10 MG tablet Take 1 tablet (10 mg total) by mouth 4 (four) times daily. 7/8/20   Ulisses Horton MD   naproxen (NAPROSYN) 500 MG tablet Take 1 tablet (500 mg total) by mouth 2 (two) times daily with meals. 9/4/19   Ulisses Horton MD   ondansetron (ZOFRAN) 8 MG tablet Take 1 tablet (8 mg total) by mouth 2 (two) times daily as needed.  Patient taking differently: Take 8 mg by mouth every 8 (eight) hours as needed for Nausea.  4/22/20   Ulisses Horton MD   pen needle, diabetic (BD ULTRA-FINE ONEIL PEN NEEDLE) 32 gauge x 5/32" Ndle Use to inject insulin daily 7/24/19   Ulisses Horton MD   TRUETEST TEST STRIPS Strp  4/7/16   Historical MD Dianne       Inpatient Medications:    Current Facility-Administered Medications:     0.9%  NaCl infusion (for blood administration), , Intravenous, Q24H PRN, Judy Turner MD    calcium carbonate 200 mg calcium (500 mg) chewable tablet 500 mg, 500 mg, Oral, BID PRN, Judy Turner MD    dextrose 10 % infusion, , Intravenous, Continuous PRN, Va Genao NP    dextrose 50% injection 12.5 g, 12.5 g, Intravenous, PRN, Va Genao, NP    enoxaparin injection 40 mg, 40 mg, Subcutaneous, Q24H, Shyam Aguila MD, 40 mg at 08/11/20 1858    ertapenem (INVANZ) " 1 g in sodium chloride 0.9 % 100 mL IVPB (ready to mix system), 1 g, Intravenous, Q24H, Judy Turner MD, Last Rate: 200 mL/hr at 08/11/20 2205, 1 g at 08/11/20 2205    fat emulsion 20% infusion 250 mL, 250 mL, Intravenous, Daily, Judy Turner MD, Last Rate: 20.8 mL/hr at 08/11/20 2205, 250 mL at 08/11/20 2205    glucagon (human recombinant) injection 1 mg, 1 mg, Intramuscular, PRN, Va Genao NP    HYDROmorphone injection 0.5 mg, 0.5 mg, Intravenous, Q4H PRN, Judy Turner MD, 0.5 mg at 08/06/20 1226    HYDROmorphone injection 1 mg, 1 mg, Intravenous, Q4H PRN, Judy Turner MD, 1 mg at 08/07/20 1244    HYDROmorphone PCA syringe 6 mg/30 mL (0.2 mg/mL) NS, , Intravenous, Continuous, Judy Turner MD    ibuprofen (CALDOLOR) 800mg/250mL D5W (READY TO MIX SYSTEM), 800 mg, Intravenous, Q6H, Judy Turner MD, 800 mg at 08/12/20 0617    insulin aspart U-100 pen 1-10 Units, 1-10 Units, Subcutaneous, Q4H PRN, Va Genao NP, 4 Units at 08/12/20 0443    insulin aspart U-100 pen 12 Units, 12 Units, Subcutaneous, Q24H, Fernando Mak MD, 12 Units at 08/12/20 0046    insulin aspart U-100 pen 12 Units, 12 Units, Subcutaneous, Q24H, Fernando Mak MD, 12 Units at 08/12/20 0444    insulin aspart U-100 pen 12 Units, 12 Units, Subcutaneous, Q24H, Fernando Mak MD, 12 Units at 08/11/20 1144    insulin aspart U-100 pen 12 Units, 12 Units, Subcutaneous, Q24H, Fernando Mak MD    insulin aspart U-100 pen 12 Units, 12 Units, Subcutaneous, Q24H, Fernando Mak MD, 12 Units at 08/11/20 1600    insulin aspart U-100 pen 12 Units, 12 Units, Subcutaneous, Q24H, Fernando Mak MD, 12 Units at 08/11/20 2037    iohexol (OMNIPAQUE) oral solution 15 mL, 15 mL, Oral, PRN, Avi Keenan DO, 15 mL at 08/11/20 1222    lidocaine 5 % patch 1 patch, 1 patch, Transdermal, Q24H, Judy Turner MD, 1 patch at 08/10/20 1808    metoprolol injection 5  mg, 5 mg, Intravenous, Q6H, Judy Turner MD, 5 mg at 08/12/20 0545    naloxone 0.4 mg/mL injection 0.02 mg, 0.02 mg, Intravenous, PRN, Judy Turner MD    ondansetron injection 4 mg, 4 mg, Intravenous, Q6H PRN, Shyam Aguila MD, 4 mg at 08/12/20 0031    ondansetron injection 8 mg, 8 mg, Intravenous, Q6H PRN, Shyam Aguila MD, 8 mg at 08/11/20 0536    pantoprazole injection 40 mg, 40 mg, Intravenous, Q24H, Judy Turner MD, 40 mg at 08/11/20 0825    promethazine (PHENERGAN) 6.25 mg in dextrose 5 % 50 mL IVPB, 6.25 mg, Intravenous, Q6H PRN, Shyam Aguila MD, Last Rate: 150 mL/hr at 08/10/20 0656, 6.25 mg at 08/10/20 0656    scopolamine 1.3-1.5 mg (1 mg over 3 days) 1 patch, 1 patch, Transdermal, Q3 Days, Ramonita Osorio MD, 1 patch at 08/10/20 0836    Flushing PICC Protocol, , , Until Discontinued **AND** sodium chloride 0.9% flush 10 mL, 10 mL, Intravenous, Q6H, 10 mL at 08/12/20 0600 **AND** sodium chloride 0.9% flush 10 mL, 10 mL, Intravenous, PRN, Shyam Aguila MD    sodium chloride 0.9% flush 10 mL, 10 mL, Intra-Catheter, PRN, Shyam Aguila MD    TPN ADULT CENTRAL LINE CUSTOM, , Intravenous, Continuous, Judy Turner MD, Last Rate: 75 mL/hr at 08/11/20 2202    traMADoL tablet 50 mg, 50 mg, Oral, Q6H PRN, Shyam Aguila MD, 50 mg at 08/05/20 2129     Anticoagulants/Antiplatelets:   no anticoagulation    Allergies:   Review of patient's allergies indicates:   Allergen Reactions    Chlorhexidine gluconate Rash     Chlorhexidine/alcohol wipes. Rash and blistering.       Review of Systems:   As documented in primary provider H&P.    Vital Signs (Most Recent):  Temp: 98.1 °F (36.7 °C) (08/12/20 0430)  Pulse: 70 (08/12/20 0553)  Resp: 15 (08/12/20 0430)  BP: (!) 154/77 (08/12/20 0553)  SpO2: 97 % (08/12/20 0430)    Physical Exam:  No acute distress, laying comfortably in bed, pleasant and cooperative  Regular rate and rhythm  Breathing unlabored  Abdomen  benign  Extremities warm and well perfused    Sedation Exam:  ASA: III - Patient appears to have severe systemic disease not posing a constant threat to life   Mallampati: II (hard and soft palate, upper portion of tonsils anduvula visible)     Laboratory:  Lab Results   Component Value Date    INR 1.0 07/14/2020       Lab Results   Component Value Date    WBC 10.36 08/12/2020    HGB 7.2 (L) 08/12/2020    HCT 23.2 (L) 08/12/2020    MCV 83 08/12/2020     (H) 08/12/2020      Lab Results   Component Value Date     (H) 08/12/2020     08/12/2020    K 4.1 08/12/2020     08/12/2020    CO2 26 08/12/2020    BUN 15 08/12/2020    CREATININE 0.9 08/12/2020    CALCIUM 8.9 08/12/2020    MG 1.9 08/12/2020    ALT 19 08/12/2020    AST 19 08/12/2020    ALBUMIN 1.5 (L) 08/12/2020    BILITOT 0.3 08/12/2020       Imaging:  Reviewed.      ASSESSMENT/PLAN:   Pt is 70yoF w/ anterior abdominal fluid collection & IR was consulted for upsizing drain.                       Sedation Plan: moderate  Patient will undergo: peritoneal fluid collection drainage      Selina Fontana MD  R1 Interventional/Diagnostic Radiology

## 2020-08-12 NOTE — PT/OT/SLP PROGRESS
Occupational Therapy      Patient Name:  Azucena Morrison   MRN:  [id number]    Patient not seen today. Chart review performed. Pt remains appropriate for OT services. Will follow-up as scheduled.    Trinh Loyola OT  8/12/2020

## 2020-08-12 NOTE — PLAN OF CARE
peritoneal fluid collection drainage completed. Drain upsized to 12 fr on LLQ. NAD noted.  Dressing applied, CDI.  PT to be transferred to [facility]. Report given at bedside. Report given to primary nurse.

## 2020-08-12 NOTE — CARE UPDATE
BG goal 140 - 180     BG is now trending upward on current SQ insulin correction resulting in patient receiving frequent correction scale insulin.     - Increase novolog to 14 units every 4 hours while on TPN therapy.   - Moderate Dose SQ Insulin Correction Scale.  - BG monitoring every 4 hours while on TPN therapy.     ADDENDUM 6:29 PM  - BG is now below goal. No changes in TPN noted. 1600 dosage held.   - Decrease scheduled Novolog to 8 units every 4 hours. Hold if TPN is stopped for any reason.     ** Please call Endocrine for any BG related issues **  ** Please notify Endocrine for any change and/or advance in diet**    Discharge Planning:  TBD. Please notify endocrinology prior to discharge.

## 2020-08-12 NOTE — SUBJECTIVE & OBJECTIVE
Subjective:     Interval History:   No acute events overnight. She feels better this morning. Pain well controlled, not having any nausea. Had almost 1.4L ostomy output yesterday. Wound vac removed d/t issues with seal.    Post-Op Info:  Procedure(s) (LRB):  RESECTION, COLON, LOW ANTERIOR (N/A)  CYSTOSCOPY (N/A)  CATHETERIZATION, URETER (Bilateral)  CREATION, ILEOSTOMY  LYSIS, ADHESIONS   12 Days Post-Op      Medications:  Continuous Infusions:   dextrose 10 % in water (D10W)      hydromorphone in 0.9 % NaCl 6 mg/30 ml      TPN ADULT CENTRAL LINE CUSTOM 75 mL/hr at 08/11/20 2202    TPN ADULT CENTRAL LINE CUSTOM       Scheduled Meds:   enoxaparin  40 mg Subcutaneous Q24H    ertapenem (INVANZ) IVPB  1 g Intravenous Q24H    fat emulsion 20%  250 mL Intravenous Daily    ibuprofen  800 mg Intravenous Q6H    [START ON 8/13/2020] insulin aspart U-100  14 Units Subcutaneous Q24H    [START ON 8/13/2020] insulin aspart U-100  14 Units Subcutaneous Q24H    [START ON 8/13/2020] insulin aspart U-100  14 Units Subcutaneous Q24H    insulin aspart U-100  14 Units Subcutaneous Q24H    insulin aspart U-100  14 Units Subcutaneous Q24H    insulin aspart U-100  14 Units Subcutaneous Q24H    lidocaine  1 patch Transdermal Q24H    metoprolol  5 mg Intravenous Q6H    pantoprozole (PROTONIX) IV  40 mg Intravenous Q24H    scopolamine  1 patch Transdermal Q3 Days    sodium chloride 0.9%  10 mL Intravenous Q6H     PRN Meds:   sodium chloride    calcium carbonate    dextrose 10 % in water (D10W)    dextrose 50%    glucagon (human recombinant)    HYDROmorphone    HYDROmorphone    insulin aspart U-100    iohexol    naloxone    ondansetron    ondansetron    promethazine (PHENERGAN) IVPB    sodium chloride 0.9%    sodium chloride 0.9%    traMADoL        Objective:     Vital Signs (Most Recent):  Temp: 98.4 °F (36.9 °C) (08/12/20 1121)  Pulse: 79 (08/12/20 1121)  Resp: 17 (08/12/20 1156)  BP: (!) 151/75 (08/12/20  1121)  SpO2: 96 % (08/12/20 1121) Vital Signs (24h Range):  Temp:  [96.3 °F (35.7 °C)-99 °F (37.2 °C)] 98.4 °F (36.9 °C)  Pulse:  [68-87] 79  Resp:  [13-20] 17  SpO2:  [96 %-99 %] 96 %  BP: (123-181)/(72-85) 151/75     Intake/Output - Last 3 Shifts       08/10 0700 - 08/11 0659 08/11 0700 - 08/12 0659 08/12 0700 - 08/13 0659    P.O. 0      I.V. (mL/kg)       IV Piggyback       .5 2808.5 104    Total Intake(mL/kg) 927.5 (13.1) 2808.5 (39.7) 104 (1.5)    Urine (mL/kg/hr) 2950 (1.7) 600 (0.4) 600 (1.6)    Emesis/NG output 0  0    Drains 10 15     Other 100 0 0    Stool 100 1350 0    Blood 0  0    Total Output 3160 1965 600    Net -2232.5 +843.5 -496           Urine Occurrence 7 x 5 x 1 x    Stool Occurrence 1 x  0 x    Emesis Occurrence 0 x  0 x          Physical Exam  Constitutional:       General: She is not in acute distress.  HENT:      Head: Normocephalic.   Eyes:      Conjunctiva/sclera: Conjunctivae normal.   Neck:      Trachea: No tracheal deviation.   Cardiovascular:      Rate and Rhythm: Regular rhythm.   Pulmonary:      Effort: Pulmonary effort is normal. No respiratory distress.   Abdominal:      Comments:   Wet-to-dry dressing in midline wound. Wound bed looks healthy.   Ostomy pink. Brown liquid stool and gas in bag.   Left flank IR drain with yellow serous output scant amount purulence in tubing.     Neurological:      Mental Status: She is alert and oriented to person, place, and time.         Significant Labs:  BMP (Last 3 Results):   Recent Labs   Lab 08/10/20  0451 08/11/20  0526 08/12/20  0421   * 137* 183*   * 134* 137   K 4.7 4.7 4.1   CL 98 98 103   CO2 28 27 26   BUN 15 16 15   CREATININE 0.8 0.9 0.9   CALCIUM 8.6* 9.1 8.9   MG 1.9 1.9 1.9     CBC (Last 3 Results):   Recent Labs   Lab 08/10/20  0451 08/11/20  0526 08/12/20  0421   WBC 13.00* 11.76 10.36   RBC 2.84* 2.90* 2.81*   HGB 7.2* 7.3* 7.2*   HCT 23.5* 23.8* 23.2*   * 609* 677*   MCV 83 82 83   MCH 25.4* 25.2*  25.6*   MCHC 30.6* 30.7* 31.0*

## 2020-08-12 NOTE — NURSING
Call to SX on call placed in reference to wound vac having been removed per wound care team during day shift. Explained to on call per wound care team note that SX team made aware, my call was to confirm SX team aware of removal of wound vac and clarification of when it will be replaced. PER on call team aware. NO orders received.

## 2020-08-12 NOTE — NURSING TRANSFER
Nursing Transfer Note      8/12/2020     Transfer To: IR     Transfer via stretcher    Transfer with cardiac monitoring    Transported by transport    Medicines sent: none    Chart send with patient: Yes    Notified: patient notified family by phone    Patient reassessed at: n/a     Upon arrival to floor: bed in lowest position

## 2020-08-12 NOTE — PROGRESS NOTES
[facility]  Colorectal Surgery  Progress Note    Patient Name: Azucena Morrison  MRN: [id number]  Admission Date: 7/15/2020  Hospital Length of Stay: 28 days  Attending Physician: Fabiana Valiente MD    Subjective:     Interval History:   No acute events overnight. She feels better this morning. Pain well controlled, not having any nausea. Had almost 1.4L ostomy output yesterday. Wound vac removed d/t issues with seal.    Post-Op Info:  Procedure(s) (LRB):  RESECTION, COLON, LOW ANTERIOR (N/A)  CYSTOSCOPY (N/A)  CATHETERIZATION, URETER (Bilateral)  CREATION, ILEOSTOMY  LYSIS, ADHESIONS   12 Days Post-Op      Medications:  Continuous Infusions:   dextrose 10 % in water (D10W)      hydromorphone in 0.9 % NaCl 6 mg/30 ml      TPN ADULT CENTRAL LINE CUSTOM 75 mL/hr at 08/11/20 2202    TPN ADULT CENTRAL LINE CUSTOM       Scheduled Meds:   enoxaparin  40 mg Subcutaneous Q24H    ertapenem (INVANZ) IVPB  1 g Intravenous Q24H    fat emulsion 20%  250 mL Intravenous Daily    ibuprofen  800 mg Intravenous Q6H    [START ON 8/13/2020] insulin aspart U-100  14 Units Subcutaneous Q24H    [START ON 8/13/2020] insulin aspart U-100  14 Units Subcutaneous Q24H    [START ON 8/13/2020] insulin aspart U-100  14 Units Subcutaneous Q24H    insulin aspart U-100  14 Units Subcutaneous Q24H    insulin aspart U-100  14 Units Subcutaneous Q24H    insulin aspart U-100  14 Units Subcutaneous Q24H    lidocaine  1 patch Transdermal Q24H    metoprolol  5 mg Intravenous Q6H    pantoprozole (PROTONIX) IV  40 mg Intravenous Q24H    scopolamine  1 patch Transdermal Q3 Days    sodium chloride 0.9%  10 mL Intravenous Q6H     PRN Meds:   sodium chloride    calcium carbonate    dextrose 10 % in water (D10W)    dextrose 50%    glucagon (human recombinant)    HYDROmorphone    HYDROmorphone    insulin aspart U-100    iohexol    naloxone    ondansetron    ondansetron    promethazine (PHENERGAN) IVPB    sodium  chloride 0.9%    sodium chloride 0.9%    traMADoL        Objective:     Vital Signs (Most Recent):  Temp: 98.4 °F (36.9 °C) (08/12/20 1121)  Pulse: 79 (08/12/20 1121)  Resp: 17 (08/12/20 1156)  BP: (!) 151/75 (08/12/20 1121)  SpO2: 96 % (08/12/20 1121) Vital Signs (24h Range):  Temp:  [96.3 °F (35.7 °C)-99 °F (37.2 °C)] 98.4 °F (36.9 °C)  Pulse:  [68-87] 79  Resp:  [13-20] 17  SpO2:  [96 %-99 %] 96 %  BP: (123-181)/(72-85) 151/75     Intake/Output - Last 3 Shifts       08/10 0700 - 08/11 0659 08/11 0700 - 08/12 0659 08/12 0700 - 08/13 0659    P.O. 0      I.V. (mL/kg)       IV Piggyback       .5 2808.5 104    Total Intake(mL/kg) 927.5 (13.1) 2808.5 (39.7) 104 (1.5)    Urine (mL/kg/hr) 2950 (1.7) 600 (0.4) 600 (1.6)    Emesis/NG output 0  0    Drains 10 15     Other 100 0 0    Stool 100 1350 0    Blood 0  0    Total Output 3160 1965 600    Net -2232.5 +843.5 -496           Urine Occurrence 7 x 5 x 1 x    Stool Occurrence 1 x  0 x    Emesis Occurrence 0 x  0 x          Physical Exam  Constitutional:       General: She is not in acute distress.  HENT:      Head: Normocephalic.   Eyes:      Conjunctiva/sclera: Conjunctivae normal.   Neck:      Trachea: No tracheal deviation.   Cardiovascular:      Rate and Rhythm: Regular rhythm.   Pulmonary:      Effort: Pulmonary effort is normal. No respiratory distress.   Abdominal:      Comments:   Wet-to-dry dressing in midline wound. Wound bed looks healthy.   Ostomy pink. Brown liquid stool and gas in bag.   Left flank IR drain with yellow serous output scant amount purulence in tubing.     Neurological:      Mental Status: She is alert and oriented to person, place, and time.         Significant Labs:  BMP (Last 3 Results):   Recent Labs   Lab 08/10/20  0451 08/11/20  0526 08/12/20  0421   * 137* 183*   * 134* 137   K 4.7 4.7 4.1   CL 98 98 103   CO2 28 27 26   BUN 15 16 15   CREATININE 0.8 0.9 0.9   CALCIUM 8.6* 9.1 8.9   MG 1.9 1.9 1.9     CBC (Last 3  Results):   Recent Labs   Lab 08/10/20  0451 08/11/20  0526 08/12/20  0421   WBC 13.00* 11.76 10.36   RBC 2.84* 2.90* 2.81*   HGB 7.2* 7.3* 7.2*   HCT 23.5* 23.8* 23.2*   * 609* 677*   MCV 83 82 83   MCH 25.4* 25.2* 25.6*   MCHC 30.6* 30.7* 31.0*         Assessment/Plan:     * Cutaneous fistula  70 y.o. female 12 Days Post-Op for Procedure(s) (LRB):  RESECTION, COLON, LOW ANTERIOR (N/A)  CYSTOSCOPY (N/A)  CATHETERIZATION, URETER (Bilateral)  CREATION, ILEOSTOMY  LYSIS, ADHESIONS   IR drain placed 8/6: cultures growing Ecoli sensitive to Ertapenem (also has a UTI with the same Ecoli)  Wound vac placed 8/10, removed 8/11  Will consult ID today for ertapenem continuation.  IR consulted for drain replacement v upsize v additional drain in enlarged collection seen on CT  Try clear liquids today after IR procedure.  May change wound vac today to further evaluate the purulence seen under the plastic  Added scopolamine patch   Continue TPN   IR drain creatinine - 0.8  IV pain meds  SCDs/DVT prophylaxis  GI prophylaxis  Encourage IS  PT/OT. Encourage to chair and ambulation.     Continue communication with use of       On total parenteral nutrition (TPN)  Reorder.    UTI (urinary tract infection)  Cultures growing E. Coli sensitive to ertapenem. First dose given 8/7. Will need at least 7d treatment for complicated UTI and abdominal fluid collection (end date Fri 8/14/)    Acute blood loss anemia  IV ferric gluconate given 8/8. Hb stable this morning at 7.2    Chronic kidney disease, stage 3  Monitor labs  Monitor I&O    Creatinine stable at 0.8. Adequate UOP.    Generalized abdominal pain  Continue IV pain meds      Essential hypertension  Monitor BP  Continue metoprolol 5 mg q6h injections while NPO. Consider restarting home BP meds when diet is able to be advance diet.     Type 2 diabetes mellitus without complication, with long-term current use of insulin  SSI  On scheduled insulin ordered by  Endo team.  Accu check Q6    Endocrinology consulted. Appreciate their recommendations.           Pat Turner MD  Colorectal Surgery  [facility]

## 2020-08-13 LAB
ALBUMIN SERPL BCP-MCNC: 1.5 G/DL (ref 3.5–5.2)
ALP SERPL-CCNC: 276 U/L (ref 55–135)
ALT SERPL W/O P-5'-P-CCNC: 24 U/L (ref 10–44)
ANION GAP SERPL CALC-SCNC: 7 MMOL/L (ref 8–16)
AST SERPL-CCNC: 29 U/L (ref 10–40)
BASOPHILS # BLD AUTO: 0.04 K/UL (ref 0–0.2)
BASOPHILS NFR BLD: 0.5 % (ref 0–1.9)
BILIRUB SERPL-MCNC: 0.3 MG/DL (ref 0.1–1)
BUN SERPL-MCNC: 18 MG/DL (ref 8–23)
CALCIUM SERPL-MCNC: 8.4 MG/DL (ref 8.7–10.5)
CHLORIDE SERPL-SCNC: 102 MMOL/L (ref 95–110)
CO2 SERPL-SCNC: 26 MMOL/L (ref 23–29)
CREAT SERPL-MCNC: 0.8 MG/DL (ref 0.5–1.4)
CRP SERPL-MCNC: 192.8 MG/L (ref 0–8.2)
DIFFERENTIAL METHOD: ABNORMAL
EOSINOPHIL # BLD AUTO: 0.5 K/UL (ref 0–0.5)
EOSINOPHIL NFR BLD: 5.2 % (ref 0–8)
ERYTHROCYTE [DISTWIDTH] IN BLOOD BY AUTOMATED COUNT: 18.2 % (ref 11.5–14.5)
EST. GFR  (AFRICAN AMERICAN): >60 ML/MIN/1.73 M^2
EST. GFR  (NON AFRICAN AMERICAN): >60 ML/MIN/1.73 M^2
GLUCOSE SERPL-MCNC: 106 MG/DL (ref 70–110)
HCT VFR BLD AUTO: 22.9 % (ref 37–48.5)
HGB BLD-MCNC: 7.1 G/DL (ref 12–16)
IMM GRANULOCYTES # BLD AUTO: 0.05 K/UL (ref 0–0.04)
IMM GRANULOCYTES NFR BLD AUTO: 0.6 % (ref 0–0.5)
LYMPHOCYTES # BLD AUTO: 1 K/UL (ref 1–4.8)
LYMPHOCYTES NFR BLD: 11.7 % (ref 18–48)
MAGNESIUM SERPL-MCNC: 1.7 MG/DL (ref 1.6–2.6)
MCH RBC QN AUTO: 25.7 PG (ref 27–31)
MCHC RBC AUTO-ENTMCNC: 31 G/DL (ref 32–36)
MCV RBC AUTO: 83 FL (ref 82–98)
MONOCYTES # BLD AUTO: 0.9 K/UL (ref 0.3–1)
MONOCYTES NFR BLD: 10.5 % (ref 4–15)
NEUTROPHILS # BLD AUTO: 6.3 K/UL (ref 1.8–7.7)
NEUTROPHILS NFR BLD: 71.5 % (ref 38–73)
NRBC BLD-RTO: 0 /100 WBC
PHOSPHATE SERPL-MCNC: 4.7 MG/DL (ref 2.7–4.5)
PLATELET # BLD AUTO: 770 K/UL (ref 150–350)
PMV BLD AUTO: 8.6 FL (ref 9.2–12.9)
POCT GLUCOSE: 123 MG/DL (ref 70–110)
POCT GLUCOSE: 130 MG/DL (ref 70–110)
POCT GLUCOSE: 152 MG/DL (ref 70–110)
POCT GLUCOSE: 161 MG/DL (ref 70–110)
POCT GLUCOSE: 187 MG/DL (ref 70–110)
POCT GLUCOSE: 98 MG/DL (ref 70–110)
POTASSIUM SERPL-SCNC: 4.3 MMOL/L (ref 3.5–5.1)
PROT SERPL-MCNC: 6.9 G/DL (ref 6–8.4)
RBC # BLD AUTO: 2.76 M/UL (ref 4–5.4)
SODIUM SERPL-SCNC: 135 MMOL/L (ref 136–145)
WBC # BLD AUTO: 8.77 K/UL (ref 3.9–12.7)

## 2020-08-13 PROCEDURE — 97116 GAIT TRAINING THERAPY: CPT | Mod: CQ

## 2020-08-13 PROCEDURE — C9113 INJ PANTOPRAZOLE SODIUM, VIA: HCPCS | Performed by: STUDENT IN AN ORGANIZED HEALTH CARE EDUCATION/TRAINING PROGRAM

## 2020-08-13 PROCEDURE — 63600175 PHARM REV CODE 636 W HCPCS: Performed by: STUDENT IN AN ORGANIZED HEALTH CARE EDUCATION/TRAINING PROGRAM

## 2020-08-13 PROCEDURE — 25000003 PHARM REV CODE 250: Performed by: PHYSICIAN ASSISTANT

## 2020-08-13 PROCEDURE — 20600001 HC STEP DOWN PRIVATE ROOM

## 2020-08-13 PROCEDURE — 25000003 PHARM REV CODE 250: Performed by: STUDENT IN AN ORGANIZED HEALTH CARE EDUCATION/TRAINING PROGRAM

## 2020-08-13 PROCEDURE — 94799 UNLISTED PULMONARY SVC/PX: CPT

## 2020-08-13 PROCEDURE — 99223 1ST HOSP IP/OBS HIGH 75: CPT | Mod: ,,, | Performed by: PHYSICIAN ASSISTANT

## 2020-08-13 PROCEDURE — A4217 STERILE WATER/SALINE, 500 ML: HCPCS | Performed by: STUDENT IN AN ORGANIZED HEALTH CARE EDUCATION/TRAINING PROGRAM

## 2020-08-13 PROCEDURE — A4216 STERILE WATER/SALINE, 10 ML: HCPCS | Performed by: STUDENT IN AN ORGANIZED HEALTH CARE EDUCATION/TRAINING PROGRAM

## 2020-08-13 PROCEDURE — 27000221 HC OXYGEN, UP TO 24 HOURS

## 2020-08-13 PROCEDURE — 86140 C-REACTIVE PROTEIN: CPT

## 2020-08-13 PROCEDURE — 63600175 PHARM REV CODE 636 W HCPCS: Performed by: PHYSICIAN ASSISTANT

## 2020-08-13 PROCEDURE — 99223 PR INITIAL HOSPITAL CARE,LEVL III: ICD-10-PCS | Mod: ,,, | Performed by: PHYSICIAN ASSISTANT

## 2020-08-13 PROCEDURE — 83735 ASSAY OF MAGNESIUM: CPT

## 2020-08-13 PROCEDURE — B4185 PARENTERAL SOL 10 GM LIPIDS: HCPCS | Performed by: STUDENT IN AN ORGANIZED HEALTH CARE EDUCATION/TRAINING PROGRAM

## 2020-08-13 PROCEDURE — 99232 PR SUBSEQUENT HOSPITAL CARE,LEVL II: ICD-10-PCS | Mod: ,,, | Performed by: NURSE PRACTITIONER

## 2020-08-13 PROCEDURE — 94761 N-INVAS EAR/PLS OXIMETRY MLT: CPT

## 2020-08-13 PROCEDURE — 97530 THERAPEUTIC ACTIVITIES: CPT | Mod: CQ

## 2020-08-13 PROCEDURE — 97535 SELF CARE MNGMENT TRAINING: CPT

## 2020-08-13 PROCEDURE — 99900035 HC TECH TIME PER 15 MIN (STAT)

## 2020-08-13 PROCEDURE — 97530 THERAPEUTIC ACTIVITIES: CPT

## 2020-08-13 PROCEDURE — 80053 COMPREHEN METABOLIC PANEL: CPT

## 2020-08-13 PROCEDURE — 94770 HC EXHALED C02 TEST: CPT

## 2020-08-13 PROCEDURE — 85025 COMPLETE CBC W/AUTO DIFF WBC: CPT

## 2020-08-13 PROCEDURE — 84100 ASSAY OF PHOSPHORUS: CPT

## 2020-08-13 PROCEDURE — 99232 SBSQ HOSP IP/OBS MODERATE 35: CPT | Mod: ,,, | Performed by: NURSE PRACTITIONER

## 2020-08-13 RX ORDER — INSULIN ASPART 100 [IU]/ML
6 INJECTION, SOLUTION INTRAVENOUS; SUBCUTANEOUS
Status: DISCONTINUED | OUTPATIENT
Start: 2020-08-14 | End: 2020-08-14

## 2020-08-13 RX ORDER — INSULIN ASPART 100 [IU]/ML
6 INJECTION, SOLUTION INTRAVENOUS; SUBCUTANEOUS
Status: DISCONTINUED | OUTPATIENT
Start: 2020-08-13 | End: 2020-08-14

## 2020-08-13 RX ORDER — MAGNESIUM SULFATE HEPTAHYDRATE 40 MG/ML
2 INJECTION, SOLUTION INTRAVENOUS ONCE
Status: COMPLETED | OUTPATIENT
Start: 2020-08-13 | End: 2020-08-13

## 2020-08-13 RX ADMIN — INSULIN ASPART 6 UNITS: 100 INJECTION, SOLUTION INTRAVENOUS; SUBCUTANEOUS at 08:08

## 2020-08-13 RX ADMIN — LIDOCAINE 1 PATCH: 50 PATCH TOPICAL at 05:08

## 2020-08-13 RX ADMIN — MAGNESIUM SULFATE HEPTAHYDRATE: 500 INJECTION, SOLUTION INTRAMUSCULAR; INTRAVENOUS at 11:08

## 2020-08-13 RX ADMIN — INSULIN ASPART 2 UNITS: 100 INJECTION, SOLUTION INTRAVENOUS; SUBCUTANEOUS at 08:08

## 2020-08-13 RX ADMIN — MEROPENEM 2 G: 1 INJECTION, POWDER, FOR SOLUTION INTRAVENOUS at 11:08

## 2020-08-13 RX ADMIN — SCOPALAMINE 1 PATCH: 1 PATCH, EXTENDED RELEASE TRANSDERMAL at 08:08

## 2020-08-13 RX ADMIN — ENOXAPARIN SODIUM 40 MG: 40 INJECTION SUBCUTANEOUS at 04:08

## 2020-08-13 RX ADMIN — Medication 10 ML: at 06:08

## 2020-08-13 RX ADMIN — ONDANSETRON 8 MG: 2 INJECTION INTRAMUSCULAR; INTRAVENOUS at 06:08

## 2020-08-13 RX ADMIN — Medication 10 ML: at 12:08

## 2020-08-13 RX ADMIN — Medication 10 ML: at 05:08

## 2020-08-13 RX ADMIN — METOPROLOL TARTRATE 5 MG: 5 INJECTION, SOLUTION INTRAVENOUS at 05:08

## 2020-08-13 RX ADMIN — INSULIN ASPART 8 UNITS: 100 INJECTION, SOLUTION INTRAVENOUS; SUBCUTANEOUS at 04:08

## 2020-08-13 RX ADMIN — IBUPROFEN 800 MG: 800 INJECTION INTRAVENOUS at 05:08

## 2020-08-13 RX ADMIN — INSULIN ASPART 8 UNITS: 100 INJECTION, SOLUTION INTRAVENOUS; SUBCUTANEOUS at 12:08

## 2020-08-13 RX ADMIN — MAGNESIUM SULFATE IN WATER 2 G: 40 INJECTION, SOLUTION INTRAVENOUS at 09:08

## 2020-08-13 RX ADMIN — IBUPROFEN 800 MG: 800 INJECTION INTRAVENOUS at 12:08

## 2020-08-13 RX ADMIN — I.V. FAT EMULSION 250 ML: 20 EMULSION INTRAVENOUS at 11:08

## 2020-08-13 RX ADMIN — INSULIN ASPART 2 UNITS: 100 INJECTION, SOLUTION INTRAVENOUS; SUBCUTANEOUS at 12:08

## 2020-08-13 RX ADMIN — PANTOPRAZOLE SODIUM 40 MG: 40 INJECTION, POWDER, LYOPHILIZED, FOR SOLUTION INTRAVENOUS at 08:08

## 2020-08-13 RX ADMIN — INSULIN ASPART 8 UNITS: 100 INJECTION, SOLUTION INTRAVENOUS; SUBCUTANEOUS at 08:08

## 2020-08-13 RX ADMIN — Medication 10 ML: at 11:08

## 2020-08-13 RX ADMIN — Medication: at 06:08

## 2020-08-13 RX ADMIN — MEROPENEM 2 G: 1 INJECTION, POWDER, FOR SOLUTION INTRAVENOUS at 04:08

## 2020-08-13 RX ADMIN — METOPROLOL TARTRATE 5 MG: 5 INJECTION, SOLUTION INTRAVENOUS at 11:08

## 2020-08-13 RX ADMIN — ONDANSETRON 8 MG: 2 INJECTION INTRAMUSCULAR; INTRAVENOUS at 04:08

## 2020-08-13 RX ADMIN — IBUPROFEN 800 MG: 800 INJECTION INTRAVENOUS at 11:08

## 2020-08-13 RX ADMIN — PROMETHAZINE HYDROCHLORIDE 6.25 MG: 25 INJECTION INTRAMUSCULAR; INTRAVENOUS at 08:08

## 2020-08-13 NOTE — ASSESSMENT & PLAN NOTE
70 y.o. female 13 Days Post-Op for Procedure(s) (LRB):  RESECTION, COLON, LOW ANTERIOR (N/A)  CYSTOSCOPY (N/A)  CATHETERIZATION, URETER (Bilateral)  CREATION, ILEOSTOMY  LYSIS, ADHESIONS   IR drain placed 8/6: cultures growing Ecoli sensitive to Ertapenem (also has a UTI with the same Ecoli). Drain upsized  By IR on 8/12. Drain output low overnight (20cc)   Wound vac placed 8/10, removed 8/11  ID consult for ertapenem continuation.  Bariatric clear liquid diet today  May change wound vac today to further evaluate the purulence seen under the plastic  Added scopolamine patch   Continue TPN   PENNY Cr normal  IV pain meds  SCDs/DVT prophylaxis  GI prophylaxis  Encourage IS  PT/OT. Encourage to chair and ambulation.     Continue communication with use of

## 2020-08-13 NOTE — HPI
69yo F Welsh speaking female with HTN, DMII w/ complex surgical abd history including colon resection and Aiyana procedure, colocutaneous fistula presents as a transfer from Dr. Horton at [facility] for treatment of colo-enteric and colo-cutaneous fistula. She presented from OSH for abx pain with associated n/v. CT scan from OSH showed partially obstructed small bowel.  She was seen here for surgical evaluation and higher level of care. She underwent lower anterior resection of colon on 7/31. This was complicated by intra-abd fluid collections and underwent IR drainage on 8/6/2020. Cultures +ESBL E.coli. we are consulted for abx recommendations.     Briefly, she had recurrent diverticulitis s/p hemicolectomy 2015 in [place] with colostomy placement. 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 She has been on IV abx previously as she has grown ESBL E.coli and ESBL Klebsiella.     No fever chills or night sweats. She is currently on ertapenem. She underwent upsizing of her drains by IR on 8/12. .    Repeat CT scan here 1. Anterior fluid collection along the surgical scar in the subcutaneous tissues measuring 5.7 x 3.6 x 3.9 cm which is new from the prior study.  Abscess is not excluded follow-up is recommended.  2. Connecting anterior intra-abdominal fluid collection measuring 7.1 x 3.5 x 10.7 cm has decreased in size.  Drainage catheter is noted with air within the collection.  This could represent an abscess.  Follow-up is recommended.  3. Mild fluid in the pelvis with obscuration of the distal colon.  This likely connects to the anterior abdominal collection.  This is similar to minimally improved from the prior study.  A perforation is not completely excluded.  Recommend follow-up.  4. Mildly dilated loop of small bowel in the left abdomen anteriorly with a few air-fluid  levels.  See above comments.  Ileus or partial small bowel obstruction is a consideration.  Recommend follow-up.

## 2020-08-13 NOTE — SUBJECTIVE & OBJECTIVE
"Interval HPI:   Overnight events: NAEON. BG within goal ranges on current SQ insulin regimen.   Eating:   clear liquid  Nausea: No  Hypoglycemia and intervention: No  Fever: No  TPN and/or TF: Yes  If yes, type of TF/TPN and rate: TPN at 75 cc/hr    BP (!) 144/77   Pulse 74   Temp 98.2 °F (36.8 °C)   Resp 17   Ht 5' 1" (1.549 m)   Wt 70.7 kg (155 lb 13.8 oz)   LMP  (LMP Unknown)   SpO2 99%   Breastfeeding No   BMI 29.45 kg/m²     Labs Reviewed and Include    Recent Labs   Lab 08/13/20  0519      CALCIUM 8.4*   ALBUMIN 1.5*   PROT 6.9   *   K 4.3   CO2 26      BUN 18   CREATININE 0.8   ALKPHOS 276*   ALT 24   AST 29   BILITOT 0.3     Lab Results   Component Value Date    WBC 8.77 08/13/2020    HGB 7.1 (L) 08/13/2020    HCT 22.9 (L) 08/13/2020    MCV 83 08/13/2020     (H) 08/13/2020     No results for input(s): TSH, FREET4 in the last 168 hours.  Lab Results   Component Value Date    HGBA1C 6.7 (H) 07/09/2020       Nutritional status:   Body mass index is 29.45 kg/m².  Lab Results   Component Value Date    ALBUMIN 1.5 (L) 08/13/2020    ALBUMIN 1.5 (L) 08/12/2020    ALBUMIN 1.4 (L) 08/11/2020     Lab Results   Component Value Date    PREALBUMIN 21 07/15/2020       Estimated Creatinine Clearance: 59.7 mL/min (based on SCr of 0.8 mg/dL).    Accu-Checks  Recent Labs     08/12/20  0441 08/12/20  0900 08/12/20  1204 08/12/20  1650 08/12/20  1715 08/12/20  1718 08/12/20  2028 08/13/20  0025 08/13/20  0438 08/13/20  0717   POCTGLUCOSE 212* 198* 161* 80 69* 72 156* 123* 130* 161*       Current Medications and/or Treatments Impacting Glycemic Control  Immunotherapy:    Immunosuppressants     None        Steroids:   Hormones (From admission, onward)    None        Pressors:    Autonomic Drugs (From admission, onward)    None        Hyperglycemia/Diabetes Medications:   Antihyperglycemics (From admission, onward)    Start     Stop Route Frequency Ordered    08/13/20 1600  insulin aspart U-100 " pen 8 Units      -- SubQ Every 24 hours (non-standard times) 08/12/20 1828    08/13/20 1200  insulin aspart U-100 pen 8 Units      -- SubQ Every 24 hours (non-standard times) 08/12/20 1828 08/13/20 0800  insulin aspart U-100 pen 8 Units      -- SubQ Every 24 hours (non-standard times) 08/12/20 1828    08/13/20 0400  insulin aspart U-100 pen 8 Units      -- SubQ Every 24 hours (non-standard times) 08/12/20 1828 08/13/20 0000  insulin aspart U-100 pen 8 Units      -- SubQ Every 24 hours (non-standard times) 08/12/20 1828    08/12/20 2000  insulin aspart U-100 pen 8 Units      -- SubQ Every 24 hours (non-standard times) 08/12/20 1828 08/04/20 1409  insulin aspart U-100 pen 1-10 Units      -- SubQ Every 4 hours PRN 08/04/20 1310

## 2020-08-13 NOTE — PT/OT/SLP PROGRESS
Physical Therapy Treatment    Patient Name:  Azucena Morrison   MRN:  [id number]    Recommendations:     Discharge Recommendations:  home health PT   Discharge Equipment Recommendations: walker, rolling   Barriers to discharge: decreased functional mobility    Assessment:     Azucena Morrison is a 70 y.o. female admitted with a medical diagnosis of Cutaneous fistula.  She presents with the following impairments/functional limitations:  weakness, impaired endurance, impaired self care skills, impaired functional mobilty, gait instability, impaired balance, decreased safety awareness, pain, impaired cardiopulmonary response to activity.    Rehab Prognosis: Good; patient would benefit from acute skilled PT services to address these deficits and reach maximum level of function.    Recent Surgery: Procedure(s) (LRB):  RESECTION, COLON, LOW ANTERIOR (N/A)  CYSTOSCOPY (N/A)  CATHETERIZATION, URETER (Bilateral)  CREATION, ILEOSTOMY  LYSIS, ADHESIONS 13 Days Post-Op    Plan:     During this hospitalization, patient to be seen 3 x/week to address the identified rehab impairments via gait training, therapeutic activities, therapeutic exercises, neuromuscular re-education and progress toward the following goals:    · Plan of Care Expires:  08/29/20    Subjective     Chief Complaint: pain  Pain/Comfort:  · Pain Rating 1: 8/10  · Location - Orientation 1: generalized  · Location 1: abdomen  · Pain Addressed 1: Pre-medicate for activity, Reposition, Distraction, Other (see comments)(active PCA in use)      Objective:     Communicated with NSG prior to session.  Patient found up in chair with PICC line, PCA upon PTA entry to room.   Session performed using [employer] system,  code: [id number]. System lost connection and unable to reconnect to  services during session. Pt able to communicate in broken english to complete session and maintain pt safety.     General Precautions: Standard, fall   Orthopedic Precautions:N/A    Braces: N/A     Functional Mobility:  · Bed Mobility:     · Supine to Sit: contact guard assistance  · Transfers:     · Sit to Stand:  stand by assistance with rolling walker  · Bed to Chair: stand by assistance with  rolling walker  using  Stand Pivot  · Toilet Transfer: stand by assistance with  rolling walker  using  Step Transfer  · Gait: Pt ambulates ~180 ft with RW and SBA. Pt with single lateral LOB to left requiring CGA for recovery and safety. Narrow NIEVES and slow antolin noted.       AM-PAC 6 CLICK MOBILITY  Turning over in bed (including adjusting bedclothes, sheets and blankets)?: 4  Sitting down on and standing up from a chair with arms (e.g., wheelchair, bedside commode, etc.): 3  Moving from lying on back to sitting on the side of the bed?: 3  Moving to and from a bed to a chair (including a wheelchair)?: 3  Need to walk in hospital room?: 3  Climbing 3-5 steps with a railing?: 3  Basic Mobility Total Score: 19       Patient left up in chair with all lines intact and call button in reach..    GOALS:   Multidisciplinary Problems     Physical Therapy Goals        Problem: Physical Therapy Goal    Goal Priority Disciplines Outcome Goal Variances Interventions   Physical Therapy Goal     PT, PT/OT Ongoing, Progressing     Description: Goals to be met by: 20     Patient will increase functional independence with mobility by performin. Supine to sit with Contact Guard Assistance.  2. Sit to supine with Modified [place].  3. Sit to stand transfer with Modified [place].  4. Bed to chair transfer with Stand-by Assistance using Rolling Walker PRN.  5. Gait  x 100 feet with Stand-by Assistance using Rolling Walker PRN.    6. Ascend/descend 4 stair with bilateral Handrails Stand-by Assistance.   7. Lower extremity exercise program x 20 reps per handout, with assistance as needed.                     Time Tracking:     PT Received On: 20  PT Start Time: 919     PT Stop Time: 945  PT  Total Time (min): 26 min     Billable Minutes: Gait Training 16 and Therapeutic Activity 10    Treatment Type: Treatment  PT/PTA: PTA     PTA Visit Number: 3     Froylan Chacon PTA  08/13/2020

## 2020-08-13 NOTE — PLAN OF CARE
A&Ox4. VVS on 2L NC. Adequate UOP per ambulation to toilet with standby assist. Q2 turns maintained. Clear liquid diet, tolerated well. Pain managed with PCA pump.  Colostomy bag changed today. TPN maintained @75. IR drain right abdomen tolerated well, small amount of output. Q4 accuchecks maintained. 4pm accucheck, held insulin per MD order.  Bed lowest position, call light in reach. PCA button in reach, pt resting comfortably. WCTM.      Problem: Diabetes Comorbidity  Goal: Blood Glucose Level Within Desired Range  Outcome: Ongoing, Progressing     Problem: Wound  Goal: Optimal Wound Healing  Outcome: Ongoing, Progressing     Problem: Adult Inpatient Plan of Care  Goal: Plan of Care Review  Outcome: Ongoing, Progressing  Goal: Patient-Specific Goal (Individualization)  Outcome: Ongoing, Progressing  Goal: Absence of Hospital-Acquired Illness or Injury  Outcome: Ongoing, Progressing  Goal: Optimal Comfort and Wellbeing  Outcome: Ongoing, Progressing  Goal: Readiness for Transition of Care  Outcome: Ongoing, Progressing  Goal: Rounds/Family Conference  Outcome: Ongoing, Progressing     Problem: Diabetes Comorbidity  Goal: Blood Glucose Level Within Desired Range  Outcome: Ongoing, Progressing     Problem: Infection  Goal: Infection Symptom Resolution  Outcome: Ongoing, Progressing     Problem: Skin Injury Risk Increased  Goal: Skin Health and Integrity  Outcome: Ongoing, Progressing     Problem: Fall Injury Risk  Goal: Absence of Fall and Fall-Related Injury  Outcome: Ongoing, Progressing     Problem: Hypertension Comorbidity  Goal: Blood Pressure in Desired Range  Outcome: Ongoing, Progressing

## 2020-08-13 NOTE — ASSESSMENT & PLAN NOTE
71yo F Maltese speaking female with HTN, DMII w/ complex surgical abd history including colon resection and Aiyana procedure, colocutaneous fistula presents as a transfer from Dr. Horton at [facility] for treatment of colo-enteric and colo-cutaneous fistula. She presented from OSH for abx pain with associated n/v. CT scan from OSH showed partially obstructed small bowel.  She was seen here for surgical evaluation and higher level of care. She underwent lower anterior resection of colon on 7/31. This was complicated by intra-abd fluid collections and underwent IR drainage on 8/6/2020. Cultures +ESBL E.coli. we are consulted for abx recommendations.      Briefly, she had recurrent diverticulitis s/p hemicolectomy 2015 in [place] with colostomy placement. 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 She has been on IV abx previously as she has grown ESBL E.coli and ESBL Klebsiella.      No fever chills or night sweats. She is currently on ertapenem. She underwent upsizing of her drains by IR on 8/12. .     Repeat CT scan here with anterior fluid collection along the surgical scar in the subcutaneous tissues measuring 5.7 x 3.6 x 3.9 cm which is new from the prior study. Connecting anterior intra-abdominal fluid collection measuring 7.1 x 3.5 x 10.7 cm has decreased in size.  Drainage catheter is noted with air within the collection.  This could represent an abscess. Mild fluid in the pelvis with obscuration of the distal colon.  This likely connects to the anterior abdominal collection.  This is similar to minimally improved from the prior study.  A perforation is not completely excluded.    Ileus or partial small bowel obstruction is a consideration      Recommendations  1. Discontinued ertapenem, started meropenem 2gq8hr for prior ESBL E.coli (R ertapenem) and ESBL Klebsiella     2.  Anticipate long term IV abx therapy pending resolution of abscesses     3. Follow up with surgical recommendations    4. Discussed with ID staff and primary team. ID will follow with you.

## 2020-08-13 NOTE — RESPIRATORY THERAPY
Rapid Response Respiratory Therapy ETCO2 Check     Time of visit: 5       Code Status: Full Code   : 1950  Bed:  A:   MRN: [id number]    SITUATION     Evaluated patient for: ETCO2 Compliance     BACKGROUND     Patient has a past medical history of Chronic kidney disease, stage 3, Diabetes mellitus, Diabetes mellitus, type 2, Hypertension, and UTI (urinary tract infection).    ASSESSMENT     Is the ETCO2 monitor on? (Yes/No)  yes  Is the patient wearing a cannula? (Yes/No) yes  Are ETCO2 orders placed? (Yes/No) yes  Is the patient on a PCA pump? (Yes/No) yes  ETCO2 monitored: ETCO2 (mmHg): 30 mmHg  O2 Device/Concentration: Room Air  Pulse: 79 Respiratory rate: 18 Temperature: Temp: 98.3 °F (36.8 °C) BP: BP: 135/76 SpO2: 97  Level of Consciousness: Level of Consciousness (AVPU): alert  All Lung Field Breath Sounds: All Lung Fields Breath Sounds: Anterior:, Lateral:, clear  MAGED Breath Sounds: crackles, fine, diminished  LLL Breath Sounds: diminished  RUL Breath Sounds: crackles, fine, diminished  RML Breath Sounds: diminished  RLL Breath Sounds: diminished  Ambu at bedside: Ambu bag with the patient?: Yes, Adult Ambu    INTERVENTIONS/RECOMMENDATIONS     Continue to monitor ETCO    PHYSICIAN ESCALATION     Physician Escalation (Yes/No): NO  Discussed plan of care primary RT,Dave, RT      FOLLOW-UP     Please call back the Rapid Response RT, Raymond Alston, RRT at x [phone] for any questions or concerns.

## 2020-08-13 NOTE — PROCEDURES
"Radiology Post-Procedure Note    Pre Op Diagnosis: peritoneal fluid collection  Post Op Diagnosis: Same    Procedure: Drain upsize    Procedure performed by: Adam    Written Informed Consent Obtained: Yes  Specimen Removed: NO  Estimated Blood Loss: Minimal    Findings:   Successful upsize of abdominal drain to 12FR    Patient tolerated procedure well.    Ranjit Medina MD ([place])  Interventional Radiology  [phone]      "

## 2020-08-13 NOTE — PLAN OF CARE
Problem: Occupational Therapy Goal  Goal: Occupational Therapy Goal  Description: Goals to be met by: 8/18/20     Patient will increase functional independence with ADLs by performing:    Feeding with [place].  UE Dressing with [place].  LE Dressing with Stand-by Assistance.  Grooming while standing at sink with Supervision.  Toileting from toilet with Supervision for hygiene and clothing management. -MET 8/13  Bathing from  standing at sink with Supervision.  Toilet transfer to toilet with Supervision.    Outcome: Ongoing, Progressing    Progressing with POC.  Mary Dickerson OT  8/13/2020

## 2020-08-13 NOTE — ASSESSMENT & PLAN NOTE
BG goal 140-180    - Novolog 8 units q 4 hrs while on TPN. HOLD if TPN is stopped for any reason.   - Continue Moderate Dose Correction Scale PRN BG excursions.   - BG monitoring q 4 hrs while on TPN (coordinate with meals during the day)     ** Please call Endocrine for any BG related issues **  ** Please call Endocrine if TPN is restarted or diet changes **    Discharge plans:  TBD. Please notify endocrinology prior to discharge.

## 2020-08-13 NOTE — PT/OT/SLP PROGRESS
Occupational Therapy   Co-Treatment    Name: Azucena Morrison  MRN: [id number]  Admitting Diagnosis:  Cutaneous fistula  13 Days Post-Op     Procedure(s):  RESECTION, COLON, LOW ANTERIOR  CYSTOSCOPY  CATHETERIZATION, URETER  CREATION, ILEOSTOMY  LYSIS, ADHESIONS     Recommendations:     Discharge Recommendations: home health OT  Discharge Equipment Recommendations:  walker, rolling  Barriers to discharge:  None    Assessment:     Azucena Morrison is a 70 y.o. female with a medical diagnosis of Cutaneous fistula.  She presents with pain but is agreeable to participate with therapy and tolerates session well. The [employer] was utilized to assist with translating throughout session. Performance deficits affecting function are weakness, impaired endurance, impaired self care skills, impaired functional mobilty, gait instability, impaired balance, decreased safety awareness, pain, impaired cardiopulmonary response to activity.     Rehab Prognosis:  Good; patient would benefit from acute skilled OT services to address these deficits and reach maximum level of function.     Plan:     Patient to be seen 3 x/week to address the above listed problems via self-care/home management, therapeutic activities, therapeutic exercises  · Plan of Care Expires: 08/31/20  · Plan of Care Reviewed with: patient    Subjective     Pain/Comfort:  · Pain Rating 1: 8/10  · Location 1: abdomen  · Pain Addressed 1: Pre-medicate for activity    Objective:     Communicated with: RN prior to session.  Patient found HOB elevated with PICC line, PCA, colostomy upon OT entry to room.    General Precautions: Standard, fall   Orthopedic Precautions:N/A   Braces: N/A     Occupational Performance:     Bed Mobility:    · Patient completed Supine to Sit to L side EOB with contact guard assistance  · Patient completed Scooting anteriorly to EOB for foot placement on floor with contact guard assistance  · Patient completed Sit to Supine with contact guard assistance      Functional Mobility/Transfers:  · Patient completed Sit <> Stand Transfer with stand by assistance  with rolling walker   · Patient completed Toilet Transfer onto the toilet in the restroom using Step Transfer technique with stand by assistance with rolling walker  · Functional Mobility: ~180' with SBA and RW, 1 LOB with CGA to correct    Activities of Daily Living:  · Grooming: stand by assistance Patient participated in hand hygiene while standing at the sink in the restroom with SBA.  · Toileting: independence Patient completed toileting on the toilet in the restroom independently.    [facility] 6 Click ADL: 19    Treatment & Education:  Role of OT/POC  Call button for assistance    Patient left up in chair with all lines intact, call button in reach and RN notifiedEducation:      GOALS:   Multidisciplinary Problems     Occupational Therapy Goals        Problem: Occupational Therapy Goal    Goal Priority Disciplines Outcome Interventions   Occupational Therapy Goal     OT, PT/OT Ongoing, Progressing    Description: Goals to be met by: 8/18/20     Patient will increase functional independence with ADLs by performing:    Feeding with [place].  UE Dressing with [place].  LE Dressing with Stand-by Assistance.  Grooming while standing at sink with Supervision.  Toileting from toilet with Supervision for hygiene and clothing management. -MET 8/13  Bathing from  standing at sink with Supervision.  Toilet transfer to toilet with Supervision.                     Time Tracking:     OT Date of Treatment: 08/13/20  OT Start Time: 0919  OT Stop Time: 0947  OT Total Time (min): 28 min    Billable Minutes:Self Care/Home Management 14 minutes  Therapeutic Activity 14 minutes    Mary Dickerson OT  8/13/2020

## 2020-08-13 NOTE — PROGRESS NOTES
Follow up for ileostomy.    Received pt lying in bed awake and alert with O2 in use per NC. Pt reports she feels better and has had less output coming from her ostomy pouch. Chart reflects pt had a successful upsize of abdominal drain yesterday.    Upon assessment of RLQ ileostomy: flat one piece ostomy pouch intact with brown liquid drainage bag. Urostomy pouch no longer in use with drainage bag system. Left pouch intact at this time due to having difficulty achieving seal on 8/11/20. Convexity pouches at bedside.    To midline abdomen: abd pad dislodged over a midline abdominal wound. Re-dressed midline abdominal wound dressing using sterile saline moistened gauze to wound bed with abd pad to cover. Wound vac remains off at this time.     Discussed with pt that post discharge, she may benefit from a convexity pouch system or the use of a flat pouch system with an Gustavo Ring to help achieve a seal. Pt is aware that starter kits have been ordered. No needs voiced per pt at this time.     Spoke with pt's nurse, who reports that attending team would like a convexity pouch placed on pt. Secure chat sent to Dr. Osorio and Dr. Valiente regarding hesitation to remove pouch at this time due to pouch being intact without any leaks.     On 8/11/20, the Wound/Ostomy Team had changed ileostomy pouch 4 times using a convexity system with difficulty maintaining a seal. However, if pt's ileostomy output has decreased, achieving a seal should be less challenging.     Wound care plans to follow up with pt tomorrow. [id number]

## 2020-08-13 NOTE — CONSULTS
[facility]  Infectious Disease  Consult Note    Patient Name: Azucena Morrison  MRN: [id number]  Admission Date: 7/15/2020  Hospital Length of Stay: 29 days  Attending Physician: Fabiana Valiente MD  Primary Care Provider: Pj Sanches MD     Isolation Status: Contact      Inpatient consult to Infectious Diseases  Consult performed by: Debbie Cadena PA-C  Consult ordered by: Judy Turner MD        Assessment/Plan:     * Cutaneous fistula  69yo F Gambian speaking female with HTN, DMII w/ complex surgical abd history including colon resection and Aiyana procedure, colocutaneous fistula presents as a transfer from Dr. Horton at [facility] for treatment of colo-enteric and colo-cutaneous fistula. She presented from OSH for abx pain with associated n/v. CT scan from OSH showed partially obstructed small bowel.  She was seen here for surgical evaluation and higher level of care. She underwent lower anterior resection of colon on 7/31. This was complicated by intra-abd fluid collections and underwent IR drainage on 8/6/2020. Cultures +ESBL E.coli. we are consulted for abx recommendations.      Briefly, she had recurrent diverticulitis s/p hemicolectomy 2015 in [place] with colostomy placement. 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 She has been on IV abx previously as she has grown ESBL E.coli and ESBL Klebsiella.      No fever chills or night sweats. She is currently on ertapenem. She underwent upsizing of her drains by IR on 8/12. .     Repeat CT scan here with anterior fluid collection along the surgical scar in the subcutaneous tissues measuring 5.7 x 3.6 x 3.9 cm which is new from the prior study. Connecting anterior intra-abdominal fluid collection measuring 7.1 x 3.5 x 10.7 cm has decreased in size.  Drainage catheter is noted with air within  the collection.  This could represent an abscess. Mild fluid in the pelvis with obscuration of the distal colon.  This likely connects to the anterior abdominal collection.  This is similar to minimally improved from the prior study.  A perforation is not completely excluded.    Ileus or partial small bowel obstruction is a consideration      Recommendations  1. Discontinued ertapenem, started meropenem 2gq8hr for prior ESBL E.coli (R ertapenem) and ESBL Klebsiella     2. Anticipate long term IV abx therapy pending resolution of abscesses     3. Follow up with surgical recommendations    4. Discussed with ID staff and primary team. ID will follow with you.           Thank you for your consult. I will follow-up with patient. Please contact us if you have any additional questions.    Debbie Cadena PA-C  Infectious Disease  [facility]    Subjective:     Principal Problem: Cutaneous fistula    HPI: 71yo F Luxembourger speaking female with HTN, DMII w/ complex surgical abd history including colon resection and Aiyana procedure, colocutaneous fistula presents as a transfer from Dr. Horton at [facility] for treatment of colo-enteric and colo-cutaneous fistula. She presented from OSH for abx pain with associated n/v. CT scan from OSH showed partially obstructed small bowel.  She was seen here for surgical evaluation and higher level of care. She underwent lower anterior resection of colon on 7/31. This was complicated by intra-abd fluid collections and underwent IR drainage on 8/6/2020. Cultures +ESBL E.coli. we are consulted for abx recommendations.     Briefly, she had recurrent diverticulitis s/p hemicolectomy 2015 in [place] with colostomy placement. 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 She has been on IV abx previously as she has grown ESBL E.coli and ESBL  Klebsiella.     No fever chills or night sweats. She is currently on ertapenem. She underwent upsizing of her drains by IR on . .    Repeat CT scan here 1. Anterior fluid collection along the surgical scar in the subcutaneous tissues measuring 5.7 x 3.6 x 3.9 cm which is new from the prior study.  Abscess is not excluded follow-up is recommended.  2. Connecting anterior intra-abdominal fluid collection measuring 7.1 x 3.5 x 10.7 cm has decreased in size.  Drainage catheter is noted with air within the collection.  This could represent an abscess.  Follow-up is recommended.  3. Mild fluid in the pelvis with obscuration of the distal colon.  This likely connects to the anterior abdominal collection.  This is similar to minimally improved from the prior study.  A perforation is not completely excluded.  Recommend follow-up.  4. Mildly dilated loop of small bowel in the left abdomen anteriorly with a few air-fluid levels.  See above comments.  Ileus or partial small bowel obstruction is a consideration.  Recommend follow-up.    Past Medical History:   Diagnosis Date    Chronic kidney disease, stage 3     Diabetes mellitus     Diabetes mellitus, type 2     Hypertension     UTI (urinary tract infection) 2020       Past Surgical History:   Procedure Laterality Date    CATHETERIZATION OF URETER Bilateral 2020    Procedure: CATHETERIZATION, URETER;  Surgeon: Kvng Cunningham MD;  Location: [facility] OR [address] [place] [address];  Service: Urology;  Laterality: Bilateral;     SECTION, CLASSIC      x2    CHOLECYSTECTOMY      COLON SURGERY      [facility]    COLONOSCOPY N/A 2018    Procedure: COLONOSCOPY;  Surgeon: Gibran Aguayo MD;  Location: [facility];  Service: Endoscopy;  Laterality: N/A;    COLOSTOMY Left     CYSTOSCOPY N/A 2020    Procedure: CYSTOSCOPY;  Surgeon: Kvng Cunningham MD;  Location: [address];  Service: Urology;  Laterality: N/A;    CYSTOSCOPY WITH URETEROSCOPY,  RETROGRADE PYELOGRAPHY, AND INSERTION OF STENT Left 2019    Procedure: CYSTOSCOPY, WITH RETROGRADE PYELOGRAM AND URETERAL STENT INSERTION with placement of a muir cath;  Surgeon: Ulisses Horton MD;  Location: [facility] OR;  Service: General;  Laterality: Left;    ILEOSTOMY  2020    Procedure: CREATION, ILEOSTOMY;  Surgeon: Fabiana Valiente MD;  Location: [facility] OR 2ND FLR;  Service: Colon and Rectal;;    INCISION AND DRAINAGE OF ABSCESS N/A 2019    Procedure: INCISION AND DRAINAGE, ABSCESS;  Surgeon: Ulisses Horton MD;  Location: [facility] OR;  Service: General;  Laterality: N/A;    INCISION OF ABDOMINAL WALL N/A 8/10/2018    Procedure: INCISION, ABDOMINAL WALL;  Surgeon: Ulisses Horton MD;  Location: [facility] OR;  Service: General;  Laterality: N/A;    INCISIONAL HERNIA REPAIR Right 2010    LOW ANTERIOR RESECTION OF COLON N/A 2020    Procedure: RESECTION, COLON, LOW ANTERIOR;  Surgeon: Fabiana Valiente MD;  Location: [facility] OR 2ND FLR;  Service: Colon and Rectal;  Laterality: N/A;    LYSIS OF ADHESIONS  2020    Procedure: LYSIS, ADHESIONS;  Surgeon: Fabiana Valiente MD;  Location: [facility] OR 2ND FLR;  Service: Colon and Rectal;;       Review of patient's allergies indicates:   Allergen Reactions    Chlorhexidine gluconate Rash     Chlorhexidine/alcohol wipes. Rash and blistering.       Medications:  Medications Prior to Admission   Medication Sig    carvedilol (COREG) 25 MG tablet Take 1 tablet (25 mg total) by mouth once daily.    acetaminophen (TYLENOL) 325 MG tablet Take 2 tablets (650 mg total) by mouth every 8 (eight) hours as needed.    betamethasone valerate 0.1% (VALISONE) 0.1 % Crea Apply around wound as needed daily for itching.    blood sugar diagnostic Strp Use to test blood sugar twice daily as directed.    blood-glucose meter Misc Use as directed to test blood sugar twice daily    [] cefTRIAXone (ROCEPHIN) 1 gram injection Inject 1 g into the vein  "once daily. End date 7/22/2020 for 13 days    clotrimazole-betamethasone 1-0.05% (LOTRISONE) cream Apply topically locally as directed    dicyclomine (BENTYL) 10 MG capsule TAKE 1 CAPSULE BY MOUTH THREE TIMES A DAY    gentamicin (GARAMYCIN) 0.1 % ointment Apply topically to affected area daily    hydroCHLOROthiazide (HYDRODIURIL) 25 MG tablet Take 1 tablet (25 mg total) by mouth once daily.    HYDROcodone-acetaminophen (NORCO) 5-325 mg per tablet Take 1 tablet by mouth every 4 (four) hours as needed.    insulin glargine 100 units/mL (3mL) SubQ pen Inject subcutaneously into the skin 10 units every morning.    lancets 30 gauge Misc Use to test blood sugar twice daily.    metFORMIN (GLUCOPHAGE) 1000 MG tablet Take 1 tablet (1,000 mg total) by mouth 2 (two) times daily.    metoclopramide HCl (REGLAN) 10 MG tablet Take 1 tablet (10 mg total) by mouth 4 (four) times daily.    naproxen (NAPROSYN) 500 MG tablet Take 1 tablet (500 mg total) by mouth 2 (two) times daily with meals.    ondansetron (ZOFRAN) 8 MG tablet Take 1 tablet (8 mg total) by mouth 2 (two) times daily as needed. (Patient taking differently: Take 8 mg by mouth every 8 (eight) hours as needed for Nausea. )    pen needle, diabetic (BD ULTRA-FINE ONEIL PEN NEEDLE) 32 gauge x 5/32" Ndle Use to inject insulin daily    TRUETEST TEST STRIPS Strp      Antibiotics (From admission, onward)    Start     Stop Route Frequency Ordered    08/13/20 1530  meropenem (MERREM) 2 g in sodium chloride 0.9% 100 mL IVPB      -- IV Every 8 hours (non-standard times) 08/13/20 1429    07/31/20 2100  mupirocin 2 % ointment      08/05 2059 Nasl 2 times daily 07/31/20 2011        Antifungals (From admission, onward)    None        Antivirals (From admission, onward)    None           Immunization History   Administered Date(s) Administered    Influenza - High Dose - PF (65 years and older) 01/21/2020    Pneumococcal Conjugate - 13 Valent 08/06/2016       Family History  "    Problem Relation (Age of Onset)    Alcohol abuse Father    Diabetes Sister, Brother    Early death Grandchild    Heart disease Mother, Sister    Hyperlipidemia Mother, Sister    Hypertension Mother, Sister    Stroke Mother, Sister        Social History     Socioeconomic History    Marital status: Single     Spouse name: Not on file    Number of children: Not on file    Years of education: Not on file    Highest education level: Not on file   Occupational History    Not on file   Social Needs    Financial resource strain: Not on file    Food insecurity     Worry: Not on file     Inability: Not on file    Transportation needs     Medical: Not on file     Non-medical: Not on file   Tobacco Use    Smoking status: Former Smoker     Types: Cigarettes     Quit date: 2000     Years since quittin.6    Smokeless tobacco: Never Used   Substance and Sexual Activity    Alcohol use: No    Drug use: No    Sexual activity: Not Currently   Lifestyle    Physical activity     Days per week: Not on file     Minutes per session: Not on file    Stress: Not on file   Relationships    Social connections     Talks on phone: Not on file     Gets together: Not on file     Attends Pentec[employer]alism service: Not on file     Active member of club or organization: Not on file     Attends meetings of clubs or organizations: Not on file     Relationship status: Not on file   Other Topics Concern    Not on file   Social History Narrative    Not on file     Review of Systems   Constitutional: Positive for activity change, appetite change and fatigue. Negative for chills, diaphoresis and fever.   Respiratory: Negative for cough and shortness of breath.    Gastrointestinal: Positive for abdominal distention and abdominal pain. Negative for diarrhea, nausea and vomiting.   Genitourinary: Negative for dysuria.   Skin: Positive for wound. Negative for color change, pallor and rash.   Neurological: Negative for headaches.   All other  systems reviewed and are negative.    Objective:     Vital Signs (Most Recent):  Temp: 98.3 °F (36.8 °C) (08/13/20 1206)  Pulse: 79 (08/13/20 1415)  Resp: 18 (08/13/20 1415)  BP: 135/76 (08/13/20 1206)  SpO2: 97 % (08/13/20 1415) Vital Signs (24h Range):  Temp:  [97.8 °F (36.6 °C)-99 °F (37.2 °C)] 98.3 °F (36.8 °C)  Pulse:  [72-98] 79  Resp:  [14-20] 18  SpO2:  [94 %-99 %] 97 %  BP: (120-167)/(63-98) 135/76     Weight: 70.7 kg (155 lb 13.8 oz)  Body mass index is 29.45 kg/m².    Estimated Creatinine Clearance: 59.7 mL/min (based on SCr of 0.8 mg/dL).    Physical Exam  Vitals signs and nursing note reviewed.   Constitutional:       Appearance: She is not toxic-appearing or diaphoretic.   HENT:      Head: Normocephalic.   Cardiovascular:      Rate and Rhythm: Normal rate and regular rhythm.   Pulmonary:      Effort: Pulmonary effort is normal. No respiratory distress.      Breath sounds: Normal breath sounds. No stridor.   Abdominal:      General: Abdomen is flat. There is distension.      Palpations: Abdomen is soft. There is no mass.      Tenderness: There is no abdominal tenderness.      Hernia: No hernia is present.      Comments: Wounds packed.   Drain in place  Colostomy in place   Skin:     General: Skin is warm and dry.      Coloration: Skin is not jaundiced or pale.   Neurological:      General: No focal deficit present.      Mental Status: She is alert and oriented to person, place, and time.   Psychiatric:         Mood and Affect: Mood normal.         Significant Labs: All pertinent labs within the past 24 hours have been reviewed.    Significant Imaging: I have reviewed all pertinent imaging results/findings within the past 24 hours.

## 2020-08-13 NOTE — PROGRESS NOTES
[facility]  Colorectal Surgery  Progress Note    Patient Name: Azucena Morrison  MRN: [id number]  Admission Date: 7/15/2020  Hospital Length of Stay: 29 days  Attending Physician: Fabiana Valiente MD    Subjective:     Interval History:   No acute events overnight.   Drain upsized by IR yesterday to 12 fr. 20cc output    Patient complaining of some nausea.  Pain well controlled.   Issues yesterday with ostomy seal and leakage around bag - output not recorded.   Wound dressings changed yesterday.     Post-Op Info:  Procedure(s) (LRB):  RESECTION, COLON, LOW ANTERIOR (N/A)  CYSTOSCOPY (N/A)  CATHETERIZATION, URETER (Bilateral)  CREATION, ILEOSTOMY  LYSIS, ADHESIONS   13 Days Post-Op      Medications:  Continuous Infusions:   dextrose 10 % in water (D10W)      hydromorphone in 0.9 % NaCl 6 mg/30 ml      TPN ADULT CENTRAL LINE CUSTOM 75 mL/hr at 08/12/20 2314    TPN ADULT CENTRAL LINE CUSTOM       Scheduled Meds:   enoxaparin  40 mg Subcutaneous Q24H    ertapenem (INVANZ) IVPB  1 g Intravenous Q24H    fat emulsion 20%  250 mL Intravenous Daily    fat emulsion 20%  250 mL Intravenous Daily    ibuprofen  800 mg Intravenous Q6H    insulin aspart U-100  8 Units Subcutaneous Q24H    insulin aspart U-100  8 Units Subcutaneous Q24H    insulin aspart U-100  8 Units Subcutaneous Q24H    insulin aspart U-100  8 Units Subcutaneous Q24H    insulin aspart U-100  8 Units Subcutaneous Q24H    insulin aspart U-100  8 Units Subcutaneous Q24H    lidocaine  1 patch Transdermal Q24H    metoprolol  5 mg Intravenous Q6H    pantoprozole (PROTONIX) IV  40 mg Intravenous Q24H    scopolamine  1 patch Transdermal Q3 Days    sodium chloride 0.9%  10 mL Intravenous Q6H     PRN Meds:   sodium chloride    calcium carbonate    dextrose 10 % in water (D10W)    dextrose 50%    glucagon (human recombinant)    HYDROmorphone    HYDROmorphone    insulin aspart U-100    iohexol    naloxone    ondansetron     ondansetron    promethazine (PHENERGAN) IVPB    sodium chloride 0.9%    sodium chloride 0.9%    traMADoL        Objective:     Vital Signs (Most Recent):  Temp: 98.2 °F (36.8 °C) (08/13/20 0650)  Pulse: 74 (08/13/20 0739)  Resp: 17 (08/13/20 0650)  BP: (!) 144/77 (08/13/20 0650)  SpO2: 99 % (08/13/20 0650) Vital Signs (24h Range):  Temp:  [97.8 °F (36.6 °C)-99 °F (37.2 °C)] 98.2 °F (36.8 °C)  Pulse:  [72-98] 74  Resp:  [14-20] 17  SpO2:  [94 %-99 %] 99 %  BP: (120-167)/(63-98) 144/77     Intake/Output - Last 3 Shifts       08/11 0700 - 08/12 0659 08/12 0700 - 08/13 0659 08/13 0700 - 08/14 0659    P.O.  60     I.V. (mL/kg)  23 (0.3)     IV Piggyback  850     TPN 2808.5 1552.6     Total Intake(mL/kg) 2808.5 (39.7) 2485.6 (35.2)     Urine (mL/kg/hr) 600 (0.4) 600 (0.4) 200 (2.9)    Emesis/NG output  0 0    Drains 15 20     Other 0 0 0    Stool 1350 0 0    Blood  0 0    Total Output 1965 620 200    Net +843.5 +1865.6 -200           Urine Occurrence 5 x 4 x 0 x    Stool Occurrence  1 x 0 x    Emesis Occurrence  0 x 0 x          Physical Exam  Constitutional:       General: She is not in acute distress.  HENT:      Head: Normocephalic.   Eyes:      Conjunctiva/sclera: Conjunctivae normal.   Neck:      Trachea: No tracheal deviation.   Cardiovascular:      Rate and Rhythm: Regular rhythm.   Pulmonary:      Effort: Pulmonary effort is normal. No respiratory distress.   Abdominal:      Comments:   Wet-to-dry dressing in midline wound. Wound bed looks healthy.   Ostomy pink. Brown liquid stool and gas in bag.   Left flank IR drain with yellow serous output scant amount purulence in tubing.     Neurological:      Mental Status: She is alert and oriented to person, place, and time.         Significant Labs:  BMP (Last 3 Results):   Recent Labs   Lab 08/11/20  0526 08/12/20  0421 08/13/20  0519   * 183* 106   * 137 135*   K 4.7 4.1 4.3   CL 98 103 102   CO2 27 26 26   BUN 16 15 18   CREATININE 0.9 0.9 0.8    CALCIUM 9.1 8.9 8.4*   MG 1.9 1.9 1.7     CBC (Last 3 Results):   Recent Labs   Lab 08/11/20  0526 08/12/20  0421 08/13/20  0519   WBC 11.76 10.36 8.77   RBC 2.90* 2.81* 2.76*   HGB 7.3* 7.2* 7.1*   HCT 23.8* 23.2* 22.9*   * 677* 770*   MCV 82 83 83   MCH 25.2* 25.6* 25.7*   MCHC 30.7* 31.0* 31.0*         Assessment/Plan:     * Cutaneous fistula  70 y.o. female 13 Days Post-Op for Procedure(s) (LRB):  RESECTION, COLON, LOW ANTERIOR (N/A)  CYSTOSCOPY (N/A)  CATHETERIZATION, URETER (Bilateral)  CREATION, ILEOSTOMY  LYSIS, ADHESIONS   IR drain placed 8/6: cultures growing Ecoli sensitive to Ertapenem (also has a UTI with the same Ecoli). Drain upsized  By IR on 8/12. Drain output low overnight (20cc)   Wound vac placed 8/10, removed 8/11  ID consult for ertapenem continuation.  Bariatric clear liquid diet today  May change wound vac today to further evaluate the purulence seen under the plastic  Added scopolamine patch   Continue TPN   PENNY Cr normal  IV pain meds  SCDs/DVT prophylaxis  GI prophylaxis  Encourage IS  PT/OT. Encourage to chair and ambulation.     Continue communication with use of       On total parenteral nutrition (TPN)  Reorder.    UTI (urinary tract infection)  Cultures growing E. Coli sensitive to ertapenem. First dose given 8/7. Will need at least 7d treatment for complicated UTI and abdominal fluid collection (end date Fri 8/14/)    Acute blood loss anemia  IV ferric gluconate given 8/8. Hb stable this morning at 7.1    Chronic kidney disease, stage 3  Monitor labs  Monitor I&O    Creatinine stable at 0.8. Adequate UOP.    Generalized abdominal pain  Continue IV pain meds      Essential hypertension  Monitor BP  Continue metoprolol 5 mg q6h injections while NPO. Consider restarting home BP meds when diet is able to be advance diet.     Type 2 diabetes mellitus without complication, with long-term current use of insulin  SSI  On scheduled insulin ordered by Endo  team.  Accu check Q6    Endocrinology consulted. Appreciate their recommendations.           Ramonita Osorio MD  Colorectal Surgery  [facility]

## 2020-08-13 NOTE — PLAN OF CARE
Plan of care reviewed with patient questions and concerns addressed and verbalized understanding.  No acute events overnight.  All vital signs stable.Telemetry= NSR 80-70s. C/O pain, relieved with PCA pump.  AAOx4.  Safety and fall precautions maintained.  Physically Mobilized to their highest level of functioning, turning from side to side, OOB to BSC. TPN at 75ml/hr. Ileostomy with liquid stool, bag leaking and changed, unable to measure ostomy output. Abdominal incision with ABD pad, dressing changed, staples underneath . Actively progressing towards goals.  See flow sheet for further information.  Will continue to monitor.

## 2020-08-13 NOTE — SUBJECTIVE & OBJECTIVE
Subjective:     Interval History:   No acute events overnight.   Drain upsized by IR yesterday to 12 fr.    Patient complaining of some nausea.  Pain well controlled.   Issues yesterday with ostomy seal and leakage around bag - output not recorded.   Wound dressings changed yesterday.     Post-Op Info:  Procedure(s) (LRB):  RESECTION, COLON, LOW ANTERIOR (N/A)  CYSTOSCOPY (N/A)  CATHETERIZATION, URETER (Bilateral)  CREATION, ILEOSTOMY  LYSIS, ADHESIONS   13 Days Post-Op      Medications:  Continuous Infusions:   dextrose 10 % in water (D10W)      hydromorphone in 0.9 % NaCl 6 mg/30 ml      TPN ADULT CENTRAL LINE CUSTOM 75 mL/hr at 08/12/20 2314    TPN ADULT CENTRAL LINE CUSTOM       Scheduled Meds:   enoxaparin  40 mg Subcutaneous Q24H    ertapenem (INVANZ) IVPB  1 g Intravenous Q24H    fat emulsion 20%  250 mL Intravenous Daily    fat emulsion 20%  250 mL Intravenous Daily    ibuprofen  800 mg Intravenous Q6H    insulin aspart U-100  8 Units Subcutaneous Q24H    insulin aspart U-100  8 Units Subcutaneous Q24H    insulin aspart U-100  8 Units Subcutaneous Q24H    insulin aspart U-100  8 Units Subcutaneous Q24H    insulin aspart U-100  8 Units Subcutaneous Q24H    insulin aspart U-100  8 Units Subcutaneous Q24H    lidocaine  1 patch Transdermal Q24H    metoprolol  5 mg Intravenous Q6H    pantoprozole (PROTONIX) IV  40 mg Intravenous Q24H    scopolamine  1 patch Transdermal Q3 Days    sodium chloride 0.9%  10 mL Intravenous Q6H     PRN Meds:   sodium chloride    calcium carbonate    dextrose 10 % in water (D10W)    dextrose 50%    glucagon (human recombinant)    HYDROmorphone    HYDROmorphone    insulin aspart U-100    iohexol    naloxone    ondansetron    ondansetron    promethazine (PHENERGAN) IVPB    sodium chloride 0.9%    sodium chloride 0.9%    traMADoL        Objective:     Vital Signs (Most Recent):  Temp: 98.2 °F (36.8 °C) (08/13/20 0650)  Pulse: 74 (08/13/20 0739)  Resp:  17 (08/13/20 0650)  BP: (!) 144/77 (08/13/20 0650)  SpO2: 99 % (08/13/20 0650) Vital Signs (24h Range):  Temp:  [97.8 °F (36.6 °C)-99 °F (37.2 °C)] 98.2 °F (36.8 °C)  Pulse:  [72-98] 74  Resp:  [14-20] 17  SpO2:  [94 %-99 %] 99 %  BP: (120-167)/(63-98) 144/77     Intake/Output - Last 3 Shifts       08/11 0700 - 08/12 0659 08/12 0700 - 08/13 0659 08/13 0700 - 08/14 0659    P.O.  60     I.V. (mL/kg)  23 (0.3)     IV Piggyback  850     TPN 2808.5 1552.6     Total Intake(mL/kg) 2808.5 (39.7) 2485.6 (35.2)     Urine (mL/kg/hr) 600 (0.4) 600 (0.4) 200 (2.9)    Emesis/NG output  0 0    Drains 15 20     Other 0 0 0    Stool 1350 0 0    Blood  0 0    Total Output 1965 620 200    Net +843.5 +1865.6 -200           Urine Occurrence 5 x 4 x 0 x    Stool Occurrence  1 x 0 x    Emesis Occurrence  0 x 0 x          Physical Exam  Constitutional:       General: She is not in acute distress.  HENT:      Head: Normocephalic.   Eyes:      Conjunctiva/sclera: Conjunctivae normal.   Neck:      Trachea: No tracheal deviation.   Cardiovascular:      Rate and Rhythm: Regular rhythm.   Pulmonary:      Effort: Pulmonary effort is normal. No respiratory distress.   Abdominal:      Comments:   Wet-to-dry dressing in midline wound. Wound bed looks healthy.   Ostomy pink. Brown liquid stool and gas in bag.   Left flank IR drain with yellow serous output scant amount purulence in tubing.     Neurological:      Mental Status: She is alert and oriented to person, place, and time.         Significant Labs:  BMP (Last 3 Results):   Recent Labs   Lab 08/11/20  0526 08/12/20  0421 08/13/20  0519   * 183* 106   * 137 135*   K 4.7 4.1 4.3   CL 98 103 102   CO2 27 26 26   BUN 16 15 18   CREATININE 0.9 0.9 0.8   CALCIUM 9.1 8.9 8.4*   MG 1.9 1.9 1.7     CBC (Last 3 Results):   Recent Labs   Lab 08/11/20  0526 08/12/20  0421 08/13/20  0519   WBC 11.76 10.36 8.77   RBC 2.90* 2.81* 2.76*   HGB 7.3* 7.2* 7.1*   HCT 23.8* 23.2* 22.9*   * 677*  770*   MCV 82 83 83   MCH 25.2* 25.6* 25.7*   MCHC 30.7* 31.0* 31.0*

## 2020-08-13 NOTE — PROGRESS NOTES
"[facility]  Endocrinology  Progress Note    Admit Date: 7/15/2020     Reason for Consult: Management of T2DM, Hyperglycemia     Surgical Procedure and Date: n/a    Lab Results   Component Value Date    HGBA1C 6.7 (H) 07/09/2020     Diabetes diagnosis year: 2015    Home Diabetes Medications:  Lantus 10 units daily if am BG >180 and Metformin 1g BID    How often checking glucose at home? once daily   BG readings on regimen: 150-low 200s  Hypoglycemia on the regimen?  No  Missed doses on regimen?  No    Diabetes Complications include:     Hyperglycemia    Complicating diabetes co morbidities:   CKD      HPI:   Patient is a 70 y.o. female with a diagnosis of T2DM, HTN, and complicated surgical history, including h/o colon resection and Aiyana procedure, takedown colostomy, colostomy closure, clot of colo cutaneous fistula. She presnts with abdominal pain and vomiting. 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 CT scan from OSH showed partially obstructed small bowel. She was admitted to [facility]. Endocrinology consulted for management of T2DM. Of note, patient's primary language is Icelandic.  An  was used for this consult.       Interval HPI:   Overnight events: NAEON. BG within goal ranges on current SQ insulin regimen.   Eating:   clear liquid  Nausea: No  Hypoglycemia and intervention: No  Fever: No  TPN and/or TF: Yes  If yes, type of TF/TPN and rate: TPN at 75 cc/hr    BP (!) 144/77   Pulse 74   Temp 98.2 °F (36.8 °C)   Resp 17   Ht 5' 1" (1.549 m)   Wt 70.7 kg (155 lb 13.8 oz)   LMP  (LMP Unknown)   SpO2 99%   Breastfeeding No   BMI 29.45 kg/m²     Labs Reviewed and Include    Recent Labs   Lab 08/13/20  0519      CALCIUM 8.4*   ALBUMIN 1.5*   PROT 6.9   *   K 4.3   CO2 26      BUN 18   CREATININE 0.8   ALKPHOS 276*   ALT 24   AST 29   BILITOT 0.3     Lab Results   Component Value Date    WBC 8.77 08/13/2020 "    HGB 7.1 (L) 08/13/2020    HCT 22.9 (L) 08/13/2020    MCV 83 08/13/2020     (H) 08/13/2020     No results for input(s): TSH, FREET4 in the last 168 hours.  Lab Results   Component Value Date    HGBA1C 6.7 (H) 07/09/2020       Nutritional status:   Body mass index is 29.45 kg/m².  Lab Results   Component Value Date    ALBUMIN 1.5 (L) 08/13/2020    ALBUMIN 1.5 (L) 08/12/2020    ALBUMIN 1.4 (L) 08/11/2020     Lab Results   Component Value Date    PREALBUMIN 21 07/15/2020       Estimated Creatinine Clearance: 59.7 mL/min (based on SCr of 0.8 mg/dL).    Accu-Checks  Recent Labs     08/12/20  0441 08/12/20  0900 08/12/20  1204 08/12/20  1650 08/12/20  1715 08/12/20  1718 08/12/20  2028 08/13/20  0025 08/13/20  0438 08/13/20  0717   POCTGLUCOSE 212* 198* 161* 80 69* 72 156* 123* 130* 161*       Current Medications and/or Treatments Impacting Glycemic Control  Immunotherapy:    Immunosuppressants     None        Steroids:   Hormones (From admission, onward)    None        Pressors:    Autonomic Drugs (From admission, onward)    None        Hyperglycemia/Diabetes Medications:   Antihyperglycemics (From admission, onward)    Start     Stop Route Frequency Ordered    08/13/20 1600  insulin aspart U-100 pen 8 Units      -- SubQ Every 24 hours (non-standard times) 08/12/20 1828    08/13/20 1200  insulin aspart U-100 pen 8 Units      -- SubQ Every 24 hours (non-standard times) 08/12/20 1828    08/13/20 0800  insulin aspart U-100 pen 8 Units      -- SubQ Every 24 hours (non-standard times) 08/12/20 1828    08/13/20 0400  insulin aspart U-100 pen 8 Units      -- SubQ Every 24 hours (non-standard times) 08/12/20 1828    08/13/20 0000  insulin aspart U-100 pen 8 Units      -- SubQ Every 24 hours (non-standard times) 08/12/20 1828    08/12/20 2000  insulin aspart U-100 pen 8 Units      -- SubQ Every 24 hours (non-standard times) 08/12/20 1828    08/04/20 1409  insulin aspart U-100 pen 1-10 Units      -- SubQ Every 4 hours PRN  08/04/20 1310          ASSESSMENT and PLAN    * Cutaneous fistula  Managed per primary team  Optimize BG control to improve wound healing        Type 2 diabetes mellitus without complication, with long-term current use of insulin  BG goal 140-180    - Novolog 8 units q 4 hrs while on TPN. HOLD if TPN is stopped for any reason.   - Continue Moderate Dose Correction Scale PRN BG excursions.   - BG monitoring q 4 hrs while on TPN (coordinate with meals during the day)     ** Please call Endocrine for any BG related issues **  ** Please call Endocrine if TPN is restarted or diet changes **    ADDENDUM:   BG trending below goal ranges  Decrease Novolog to 6 units q 4 hrs while on TPN    Discharge plans:  TBD. Please notify endocrinology prior to discharge.       Chronic kidney disease, stage 3  Titrate insulin slowly  Avoid insulin stacking      Essential hypertension  Managed per primary team  Condition may cause insulin resistance             Va Genao, TRYELL  Endocrinology  [facility]

## 2020-08-14 LAB
ALBUMIN SERPL BCP-MCNC: 1.6 G/DL (ref 3.5–5.2)
ALP SERPL-CCNC: 256 U/L (ref 55–135)
ALT SERPL W/O P-5'-P-CCNC: 17 U/L (ref 10–44)
ANION GAP SERPL CALC-SCNC: 9 MMOL/L (ref 8–16)
AST SERPL-CCNC: 17 U/L (ref 10–40)
BASOPHILS # BLD AUTO: 0.04 K/UL (ref 0–0.2)
BASOPHILS NFR BLD: 0.4 % (ref 0–1.9)
BILIRUB SERPL-MCNC: 0.3 MG/DL (ref 0.1–1)
BUN SERPL-MCNC: 16 MG/DL (ref 8–23)
CALCIUM SERPL-MCNC: 9 MG/DL (ref 8.7–10.5)
CHLORIDE SERPL-SCNC: 104 MMOL/L (ref 95–110)
CO2 SERPL-SCNC: 25 MMOL/L (ref 23–29)
CREAT SERPL-MCNC: 0.9 MG/DL (ref 0.5–1.4)
CRP SERPL-MCNC: 202.3 MG/L (ref 0–8.2)
DIFFERENTIAL METHOD: ABNORMAL
EOSINOPHIL # BLD AUTO: 0.3 K/UL (ref 0–0.5)
EOSINOPHIL NFR BLD: 3 % (ref 0–8)
ERYTHROCYTE [DISTWIDTH] IN BLOOD BY AUTOMATED COUNT: 17.8 % (ref 11.5–14.5)
EST. GFR  (AFRICAN AMERICAN): >60 ML/MIN/1.73 M^2
EST. GFR  (NON AFRICAN AMERICAN): >60 ML/MIN/1.73 M^2
GLUCOSE SERPL-MCNC: 184 MG/DL (ref 70–110)
HCT VFR BLD AUTO: 23.5 % (ref 37–48.5)
HGB BLD-MCNC: 7.3 G/DL (ref 12–16)
IMM GRANULOCYTES # BLD AUTO: 0.06 K/UL (ref 0–0.04)
IMM GRANULOCYTES NFR BLD AUTO: 0.6 % (ref 0–0.5)
LYMPHOCYTES # BLD AUTO: 1.2 K/UL (ref 1–4.8)
LYMPHOCYTES NFR BLD: 12.4 % (ref 18–48)
MAGNESIUM SERPL-MCNC: 2.1 MG/DL (ref 1.6–2.6)
MCH RBC QN AUTO: 25.7 PG (ref 27–31)
MCHC RBC AUTO-ENTMCNC: 31.1 G/DL (ref 32–36)
MCV RBC AUTO: 83 FL (ref 82–98)
MONOCYTES # BLD AUTO: 0.8 K/UL (ref 0.3–1)
MONOCYTES NFR BLD: 8.9 % (ref 4–15)
NEUTROPHILS # BLD AUTO: 6.9 K/UL (ref 1.8–7.7)
NEUTROPHILS NFR BLD: 74.7 % (ref 38–73)
NRBC BLD-RTO: 0 /100 WBC
PHOSPHATE SERPL-MCNC: 4.4 MG/DL (ref 2.7–4.5)
PLATELET # BLD AUTO: 856 K/UL (ref 150–350)
PMV BLD AUTO: 8.8 FL (ref 9.2–12.9)
POCT GLUCOSE: 149 MG/DL (ref 70–110)
POCT GLUCOSE: 155 MG/DL (ref 70–110)
POCT GLUCOSE: 169 MG/DL (ref 70–110)
POCT GLUCOSE: 196 MG/DL (ref 70–110)
POCT GLUCOSE: 222 MG/DL (ref 70–110)
POCT GLUCOSE: 226 MG/DL (ref 70–110)
POCT GLUCOSE: 80 MG/DL (ref 70–110)
POTASSIUM SERPL-SCNC: 4.4 MMOL/L (ref 3.5–5.1)
PROT SERPL-MCNC: 7.4 G/DL (ref 6–8.4)
RBC # BLD AUTO: 2.84 M/UL (ref 4–5.4)
SODIUM SERPL-SCNC: 138 MMOL/L (ref 136–145)
WBC # BLD AUTO: 9.29 K/UL (ref 3.9–12.7)

## 2020-08-14 PROCEDURE — A4217 STERILE WATER/SALINE, 500 ML: HCPCS | Performed by: STUDENT IN AN ORGANIZED HEALTH CARE EDUCATION/TRAINING PROGRAM

## 2020-08-14 PROCEDURE — B4185 PARENTERAL SOL 10 GM LIPIDS: HCPCS | Performed by: STUDENT IN AN ORGANIZED HEALTH CARE EDUCATION/TRAINING PROGRAM

## 2020-08-14 PROCEDURE — 99233 PR SUBSEQUENT HOSPITAL CARE,LEVL III: ICD-10-PCS | Mod: ,,, | Performed by: PHYSICIAN ASSISTANT

## 2020-08-14 PROCEDURE — 63600175 PHARM REV CODE 636 W HCPCS: Performed by: STUDENT IN AN ORGANIZED HEALTH CARE EDUCATION/TRAINING PROGRAM

## 2020-08-14 PROCEDURE — 20600001 HC STEP DOWN PRIVATE ROOM

## 2020-08-14 PROCEDURE — 97607 NEG PRS WND THR NDME<=50SQCM: CPT

## 2020-08-14 PROCEDURE — 25000003 PHARM REV CODE 250: Performed by: PHYSICIAN ASSISTANT

## 2020-08-14 PROCEDURE — 25000003 PHARM REV CODE 250: Performed by: STUDENT IN AN ORGANIZED HEALTH CARE EDUCATION/TRAINING PROGRAM

## 2020-08-14 PROCEDURE — C9113 INJ PANTOPRAZOLE SODIUM, VIA: HCPCS | Performed by: STUDENT IN AN ORGANIZED HEALTH CARE EDUCATION/TRAINING PROGRAM

## 2020-08-14 PROCEDURE — 63600175 PHARM REV CODE 636 W HCPCS: Performed by: PHYSICIAN ASSISTANT

## 2020-08-14 PROCEDURE — 86140 C-REACTIVE PROTEIN: CPT

## 2020-08-14 PROCEDURE — 84100 ASSAY OF PHOSPHORUS: CPT

## 2020-08-14 PROCEDURE — 83735 ASSAY OF MAGNESIUM: CPT

## 2020-08-14 PROCEDURE — 80053 COMPREHEN METABOLIC PANEL: CPT

## 2020-08-14 PROCEDURE — 94761 N-INVAS EAR/PLS OXIMETRY MLT: CPT

## 2020-08-14 PROCEDURE — A4216 STERILE WATER/SALINE, 10 ML: HCPCS | Performed by: STUDENT IN AN ORGANIZED HEALTH CARE EDUCATION/TRAINING PROGRAM

## 2020-08-14 PROCEDURE — 94770 HC EXHALED C02 TEST: CPT

## 2020-08-14 PROCEDURE — 85025 COMPLETE CBC W/AUTO DIFF WBC: CPT

## 2020-08-14 PROCEDURE — 99232 PR SUBSEQUENT HOSPITAL CARE,LEVL II: ICD-10-PCS | Mod: ,,, | Performed by: NURSE PRACTITIONER

## 2020-08-14 PROCEDURE — 99232 SBSQ HOSP IP/OBS MODERATE 35: CPT | Mod: ,,, | Performed by: NURSE PRACTITIONER

## 2020-08-14 PROCEDURE — 99900035 HC TECH TIME PER 15 MIN (STAT)

## 2020-08-14 PROCEDURE — 99233 SBSQ HOSP IP/OBS HIGH 50: CPT | Mod: ,,, | Performed by: PHYSICIAN ASSISTANT

## 2020-08-14 RX ORDER — METOCLOPRAMIDE 5 MG/1
10 TABLET ORAL
Status: DISCONTINUED | OUTPATIENT
Start: 2020-08-14 | End: 2020-08-21 | Stop reason: HOSPADM

## 2020-08-14 RX ORDER — CARVEDILOL 12.5 MG/1
12.5 TABLET ORAL 2 TIMES DAILY
Status: DISCONTINUED | OUTPATIENT
Start: 2020-08-14 | End: 2020-08-21 | Stop reason: HOSPADM

## 2020-08-14 RX ORDER — INSULIN ASPART 100 [IU]/ML
8 INJECTION, SOLUTION INTRAVENOUS; SUBCUTANEOUS
Status: DISCONTINUED | OUTPATIENT
Start: 2020-08-14 | End: 2020-08-18

## 2020-08-14 RX ORDER — INSULIN ASPART 100 [IU]/ML
0-5 INJECTION, SOLUTION INTRAVENOUS; SUBCUTANEOUS EVERY 4 HOURS PRN
Status: DISCONTINUED | OUTPATIENT
Start: 2020-08-14 | End: 2020-08-21 | Stop reason: HOSPADM

## 2020-08-14 RX ORDER — INSULIN ASPART 100 [IU]/ML
8 INJECTION, SOLUTION INTRAVENOUS; SUBCUTANEOUS
Status: DISCONTINUED | OUTPATIENT
Start: 2020-08-15 | End: 2020-08-18

## 2020-08-14 RX ORDER — IBUPROFEN 400 MG/1
400 TABLET ORAL 3 TIMES DAILY
Status: DISCONTINUED | OUTPATIENT
Start: 2020-08-14 | End: 2020-08-21 | Stop reason: HOSPADM

## 2020-08-14 RX ORDER — HYDROCHLOROTHIAZIDE 25 MG/1
25 TABLET ORAL DAILY
Status: DISCONTINUED | OUTPATIENT
Start: 2020-08-14 | End: 2020-08-21 | Stop reason: HOSPADM

## 2020-08-14 RX ADMIN — IBUPROFEN 400 MG: 400 TABLET, FILM COATED ORAL at 11:08

## 2020-08-14 RX ADMIN — INSULIN ASPART 6 UNITS: 100 INJECTION, SOLUTION INTRAVENOUS; SUBCUTANEOUS at 08:08

## 2020-08-14 RX ADMIN — INSULIN ASPART 6 UNITS: 100 INJECTION, SOLUTION INTRAVENOUS; SUBCUTANEOUS at 12:08

## 2020-08-14 RX ADMIN — INSULIN ASPART 8 UNITS: 100 INJECTION, SOLUTION INTRAVENOUS; SUBCUTANEOUS at 04:08

## 2020-08-14 RX ADMIN — MEROPENEM 2 G: 1 INJECTION, POWDER, FOR SOLUTION INTRAVENOUS at 09:08

## 2020-08-14 RX ADMIN — METOPROLOL TARTRATE 5 MG: 5 INJECTION, SOLUTION INTRAVENOUS at 12:08

## 2020-08-14 RX ADMIN — MAGNESIUM SULFATE HEPTAHYDRATE: 500 INJECTION, SOLUTION INTRAMUSCULAR; INTRAVENOUS at 09:08

## 2020-08-14 RX ADMIN — Medication: at 11:08

## 2020-08-14 RX ADMIN — PANTOPRAZOLE SODIUM 40 MG: 40 INJECTION, POWDER, LYOPHILIZED, FOR SOLUTION INTRAVENOUS at 09:08

## 2020-08-14 RX ADMIN — METOCLOPRAMIDE 10 MG: 5 TABLET ORAL at 04:08

## 2020-08-14 RX ADMIN — Medication 10 ML: at 06:08

## 2020-08-14 RX ADMIN — ENOXAPARIN SODIUM 40 MG: 40 INJECTION SUBCUTANEOUS at 04:08

## 2020-08-14 RX ADMIN — INSULIN ASPART 8 UNITS: 100 INJECTION, SOLUTION INTRAVENOUS; SUBCUTANEOUS at 08:08

## 2020-08-14 RX ADMIN — INSULIN ASPART 6 UNITS: 100 INJECTION, SOLUTION INTRAVENOUS; SUBCUTANEOUS at 04:08

## 2020-08-14 RX ADMIN — METOPROLOL TARTRATE 5 MG: 5 INJECTION, SOLUTION INTRAVENOUS at 06:08

## 2020-08-14 RX ADMIN — MEROPENEM 2 G: 1 INJECTION, POWDER, FOR SOLUTION INTRAVENOUS at 10:08

## 2020-08-14 RX ADMIN — I.V. FAT EMULSION 250 ML: 20 EMULSION INTRAVENOUS at 09:08

## 2020-08-14 RX ADMIN — ONDANSETRON 4 MG: 2 INJECTION INTRAMUSCULAR; INTRAVENOUS at 02:08

## 2020-08-14 RX ADMIN — METOCLOPRAMIDE 10 MG: 5 TABLET ORAL at 11:08

## 2020-08-14 RX ADMIN — Medication 10 ML: at 12:08

## 2020-08-14 RX ADMIN — INSULIN ASPART 4 UNITS: 100 INJECTION, SOLUTION INTRAVENOUS; SUBCUTANEOUS at 08:08

## 2020-08-14 RX ADMIN — CARVEDILOL 12.5 MG: 12.5 TABLET, FILM COATED ORAL at 09:08

## 2020-08-14 RX ADMIN — IBUPROFEN 800 MG: 800 INJECTION INTRAVENOUS at 06:08

## 2020-08-14 RX ADMIN — CARVEDILOL 12.5 MG: 12.5 TABLET, FILM COATED ORAL at 11:08

## 2020-08-14 RX ADMIN — HYDROCHLOROTHIAZIDE 25 MG: 25 TABLET ORAL at 11:08

## 2020-08-14 RX ADMIN — IBUPROFEN 400 MG: 400 TABLET, FILM COATED ORAL at 04:08

## 2020-08-14 RX ADMIN — IBUPROFEN 800 MG: 800 INJECTION INTRAVENOUS at 12:08

## 2020-08-14 RX ADMIN — MEROPENEM 2 G: 1 INJECTION, POWDER, FOR SOLUTION INTRAVENOUS at 02:08

## 2020-08-14 RX ADMIN — IBUPROFEN 400 MG: 400 TABLET, FILM COATED ORAL at 09:08

## 2020-08-14 RX ADMIN — INSULIN ASPART 2 UNITS: 100 INJECTION, SOLUTION INTRAVENOUS; SUBCUTANEOUS at 12:08

## 2020-08-14 NOTE — ASSESSMENT & PLAN NOTE
71yo F Grenadian speaking female with HTN, DMII w/ complex surgical abd history including colon resection and Aiyana procedure, colocutaneous fistula presents as a transfer from Dr. Horton at [facility] for treatment of colo-enteric and colo-cutaneous fistula. She presented from OSH for abx pain with associated n/v. CT scan from OSH showed partially obstructed small bowel.  She was seen here for surgical evaluation and higher level of care. She underwent lower anterior resection of colon on 7/31. This was complicated by intra-abd fluid collections and underwent IR drainage on 8/6/2020. Cultures +ESBL E.coli. we are consulted for abx recommendations.      Briefly, she had recurrent diverticulitis s/p hemicolectomy 2015 in [place] with colostomy placement. 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 She has been on IV abx previously as she has grown ESBL E.coli and ESBL Klebsiella as well as Actinomyces.     Repeat CT scan here with anterior fluid collection along the surgical scar in the subcutaneous tissues measuring 5.7 x 3.6 x 3.9 cm which is new from the prior study. Connecting anterior intra-abdominal fluid collection measuring 7.1 x 3.5 x 10.7 cm has decreased in size.  Drainage catheter is noted with air within the collection.  This could represent an abscess. Mild fluid in the pelvis with obscuration of the distal colon.  This likely connects to the anterior abdominal collection.  This is similar to minimally improved from the prior study.  A perforation is not completely excluded.    Ileus or partial small bowel obstruction is a consideration    No fever chills or night sweats.  She underwent upsizing of her drains by IR on 8/12.       Recommendations  - Continue meropenem 2gq8hr for prior ESBL E.coli (R ertapenem) and ESBL Klebsiella   - Recommend 2-3 weeks of abx from date  of drain upsize for intraabdominal infection.  Est.  End date of IV antibiotics: (8/26/20-9/2/20).  CT scan prior to discontinuing abx.   - Weekly outpatient laboratory on Monday or Tuesday while on IV antibiotics. CBC, CMP, ESR and CRP.  Fax laboratory results to [facility] at [phone] with attn: Rigoberto Bunch  -Outpatient Infectious Diseases clinic follow up will be arranged and found in patient calendar.  Prior to discharge, please ensure the patient's follow-up has been scheduled.  If there is still no follow-up scheduled in Infectious Diseases clinic, please send an EPIC message to Vicki Correa LPN in Infectious Diseases.  -ID will sign off.  Please re-consult with any new infectious concerns.

## 2020-08-14 NOTE — ASSESSMENT & PLAN NOTE
Cultures growing E. Coli sensitive to ertapenem. First dose given 8/7. Infectious diseases consulted. Switched to meropenem on 8/13 by ID for ESBL E. Coli. Anticipated long course IV antibiotic treatment. Appreciate recommendations.

## 2020-08-14 NOTE — PROGRESS NOTES
Spoke with Dr. Valiente regarding pt's care and it was agreed upon to reach out to TRISHA Saravia to assist with ileostomy. Contacted Manjit and was agreed to meet in pt's room at 11 am.     Met with Manjit in Ms. Morrison's room to assess RLQ ileostomy. Received pt lying in bed awake and alert with nurse at bedside.     Upon assessment RLQ ileostomy: ostomy appears less swollen and measures 25 mm and is red, moist and rosebud in appearance with mucosal separation noted to the inferior portion of the wound bed. Stoma powder applied to mucosal separation with a coloplast brava thin moldable ring applied around the peristomal skin. Deep convexity wound pouch applied with belt. Supplies left at bedside.    To abdominal midline: it was agreed upon per COLE Lott to attempt to place wound vac to abdomen to help ensure seal. Open area of full thickness with sutures noted to base of wound bed with fascia. Wound area measures 6.5 cm 3.5 cm. Cleansed wound bed with sterile normal saline and applied restore to base of wound bed with black foam to base of wound with wound vac settings at 125mmHg continuous. Good seal intact.    (see assessment and photo below)    Discussed application of convexity and wound vac with Dr. Valiente and Dr. Turner. Also, recommended post discharge that the pt may benefit from going to an LTAC or to SNF for continuation of care. Pt is receptive to learning about how to manage her ostomy, but may need more extensive care due to her abdominal wound.     Recommendations:  -To ileostomy: Convexity 1 piece pouch with moldable ring to peristomal area with the use of belt to help anchor ostomy pouch. Supplies left at bedside.  -Wound care team to change wound vac 2 x a week. Cleanse wound bed with sterile normal saline with restore silver nonadherent contact layer or white foam to base of wound bed with black foam to cover. Wound vac at 125mmHg continuous suction.   -Nurses to assess Wound VAC for suction  and seal qshift. Should seal be lost and unable to be re achieved. Nurse to remove VAC dressing  and apply saline moistened gauze to the wound bed daily until VAC can be reapplied. Notify MD/wound care.     Plan of care discussed with pt, pt's nurse at bedside, Dr. Valiente and Dr. Turner. Wound care will continue to follow pt PRN. [id number]          08/14/20 1135        Colostomy 07/31/20 1943 Loop RLQ   Placement Date/Time: 07/31/20 1943   Present Prior to Hospital Arrival?: No  Inserted by: MD  Colostomy Type: Loop  Location: RLQ   Wound Image    Stomal Appliance 1 piece;Changed;Leakage;Convexity   Stoma Appearance rosebud appearance;pink;red;protruding above skin level;mucocutaneous separation;oval   Stoma Size (in) 25   Site Assessment Mucocutaneous separation;Oval;Pink   Peristomal Assessment Intact;Moist   Accessories/Skin Care appliance belt;cleansed w/ sterile normal saline;skin barrier powder;skin barrier ring   Stoma Function stool;brown   Treatment Bag change;Site care   Tolerance no signs/symptoms of discomfort        Negative Pressure Wound Therapy  08/14/20 midline   Placement Date: 08/14/20   Orientation: midline  Location: Abdomen   NPWT Type Vacuum Therapy   Therapy Setting NPWT Continuous therapy   Pressure Setting NPWT 125 mmHg   Therapy Interventions NPWT Seal intact   Sponges Inserted NPWT Black;1;Other  (restore)

## 2020-08-14 NOTE — PLAN OF CARE
Pt is AAOx4; afebrile; vital signs stable. On tele in NS w/ HR in the 70s-80s. This morning, ostomy leaking copious amounts of liquid stool. All dressings replaced, and wound care called for assistance. They placed a convex ostomy bag with a belt. They also placed a wound vac back over midline incision. BG ranged from 196-226. TPN continuous. Diabetic diet added, but she did not want to eat any lunch. Pain well controlled with ibuprofen and dilaudid PCA. She is up with one assist.

## 2020-08-14 NOTE — SUBJECTIVE & OBJECTIVE
Interval History: Pt afebrile w/o white count.  Drains in place.  Ostomy bag changed by wound care as was leaking.      Review of Systems   Constitutional: Positive for activity change, appetite change and fatigue. Negative for chills, diaphoresis and fever.   Respiratory: Negative for cough and shortness of breath.    Gastrointestinal: Positive for abdominal distention and abdominal pain. Negative for diarrhea, nausea and vomiting.   Genitourinary: Negative for dysuria.   Skin: Positive for wound. Negative for color change, pallor and rash.   Neurological: Negative for headaches.   All other systems reviewed and are negative.    Objective:     Vital Signs (Most Recent):  Temp: 98.6 °F (37 °C) (08/14/20 0800)  Pulse: 71 (08/14/20 1140)  Resp: 16 (08/14/20 1141)  BP: (!) 167/86 (08/14/20 0800)  SpO2: 98 % (08/14/20 0800) Vital Signs (24h Range):  Temp:  [97.9 °F (36.6 °C)-98.6 °F (37 °C)] 98.6 °F (37 °C)  Pulse:  [71-91] 71  Resp:  [16-20] 16  SpO2:  [96 %-98 %] 98 %  BP: (135-186)/(73-95) 167/86     Weight: 70.7 kg (155 lb 13.8 oz)  Body mass index is 29.45 kg/m².    Estimated Creatinine Clearance: 53.1 mL/min (based on SCr of 0.9 mg/dL).    Physical Exam  Vitals signs and nursing note reviewed.   Constitutional:       Appearance: She is not toxic-appearing or diaphoretic.   HENT:      Head: Normocephalic.   Cardiovascular:      Rate and Rhythm: Normal rate and regular rhythm.   Pulmonary:      Effort: Pulmonary effort is normal. No respiratory distress.      Breath sounds: Normal breath sounds. No stridor.   Abdominal:      General: Abdomen is flat. There is distension.      Palpations: Abdomen is soft. There is no mass.      Tenderness: There is no abdominal tenderness.      Hernia: No hernia is present.      Comments: Undergoing wound change by wound care of ostomy.   Skin:     General: Skin is warm and dry.      Coloration: Skin is not jaundiced or pale.   Neurological:      General: No focal deficit present.       Mental Status: She is alert and oriented to person, place, and time.   Psychiatric:         Mood and Affect: Mood normal.         Significant Labs: All pertinent labs within the past 24 hours have been reviewed.    Significant Imaging: I have reviewed all pertinent imaging results/findings within the past 24 hours.

## 2020-08-14 NOTE — ASSESSMENT & PLAN NOTE
BG goal 140-180    - Increase Novolog to 8 units q 4 hrs while on TPN. HOLD if TPN is stopped for any reason.   - Change to Low Dose Correction Scale PRN BG excursions.   - BG monitoring q 4 hrs while on TPN (coordinate with meals during the day)     ** Please call Endocrine for any BG related issues **  ** Please call Endocrine if TPN is restarted or diet changes **    Discharge plans:  TBD. Please notify endocrinology prior to discharge.

## 2020-08-14 NOTE — PROGRESS NOTES
[facility]  Infectious Disease  Progress Note    Patient Name: Azucena Morrison  MRN: [id number]  Admission Date: 7/15/2020  Length of Stay: 30 days  Attending Physician: Fabiana Valiente MD  Primary Care Provider: Pj Sanches MD    Isolation Status: Contact  Assessment/Plan:      * Cutaneous fistula   69yo F Ghanaian speaking female with HTN, DMII w/ complex surgical abd history including colon resection and Aiyana procedure, colocutaneous fistula presents as a transfer from Dr. Horton at [facility] for treatment of colo-enteric and colo-cutaneous fistula. She presented from OSH for abx pain with associated n/v. CT scan from OSH showed partially obstructed small bowel.  She was seen here for surgical evaluation and higher level of care. She underwent lower anterior resection of colon on 7/31. This was complicated by intra-abd fluid collections and underwent IR drainage on 8/6/2020. Cultures +ESBL E.coli. we are consulted for abx recommendations.      Briefly, she had recurrent diverticulitis s/p hemicolectomy 2015 in [place] with colostomy placement. 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 She has been on IV abx previously as she has grown ESBL E.coli and ESBL Klebsiella as well as Actinomyces.     Repeat CT scan here with anterior fluid collection along the surgical scar in the subcutaneous tissues measuring 5.7 x 3.6 x 3.9 cm which is new from the prior study. Connecting anterior intra-abdominal fluid collection measuring 7.1 x 3.5 x 10.7 cm has decreased in size.  Drainage catheter is noted with air within the collection.  This could represent an abscess. Mild fluid in the pelvis with obscuration of the distal colon.  This likely connects to the anterior abdominal collection.  This is similar to minimally improved from the prior study.  A perforation is  not completely excluded.    Ileus or partial small bowel obstruction is a consideration    No fever chills or night sweats.  She underwent upsizing of her drains by IR on 8/12.       Recommendations  - Continue meropenem 2gq8hr for prior ESBL E.coli (R ertapenem) and ESBL Klebsiella   - Recommend 2-3 weeks of abx from date of drain upsize for intraabdominal infection.  Est.  End date of IV antibiotics: (8/26/20-9/2/20).  CT scan prior to discontinuing abx.   - Weekly outpatient laboratory on Monday or Tuesday while on IV antibiotics. CBC, CMP, ESR and CRP.  Fax laboratory results to [facility] at [phone] with attn: Rigoberto Bunch  -Outpatient Infectious Diseases clinic follow up will be arranged and found in patient calendar.  Prior to discharge, please ensure the patient's follow-up has been scheduled.  If there is still no follow-up scheduled in Infectious Diseases clinic, please send an EPIC message to Vicki Correa LPN in Infectious Diseases.  -ID will sign off.  Please re-consult with any new infectious concerns.        Pt discussed with primary team and staff.      Thank you for your consult. I will sign off. Please contact us if you have any additional questions.    Gustavo Bunch PA-C  Infectious Disease  [facility]    Subjective:     Principal Problem:Cutaneous fistula    HPI: 71yo F Danish speaking female with HTN, DMII w/ complex surgical abd history including colon resection and Aiyana procedure, colocutaneous fistula presents as a transfer from Dr. Horton at [facility] for treatment of colo-enteric and colo-cutaneous fistula. She presented from OSH for abx pain with associated n/v. CT scan from OSH showed partially obstructed small bowel.  She was seen here for surgical evaluation and higher level of care. She underwent lower anterior resection of colon on 7/31. This was complicated by intra-abd fluid collections and underwent IR drainage on 8/6/2020.  Cultures +ESBL E.coli. we are consulted for abx recommendations.     Briefly, she had recurrent diverticulitis s/p hemicolectomy 2015 in [place] with colostomy placement. 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 She has been on IV abx previously as she has grown ESBL E.coli and ESBL Klebsiella.     No fever chills or night sweats. She is currently on ertapenem. She underwent upsizing of her drains by IR on 8/12. .    Repeat CT scan here 1. Anterior fluid collection along the surgical scar in the subcutaneous tissues measuring 5.7 x 3.6 x 3.9 cm which is new from the prior study.  Abscess is not excluded follow-up is recommended.  2. Connecting anterior intra-abdominal fluid collection measuring 7.1 x 3.5 x 10.7 cm has decreased in size.  Drainage catheter is noted with air within the collection.  This could represent an abscess.  Follow-up is recommended.  3. Mild fluid in the pelvis with obscuration of the distal colon.  This likely connects to the anterior abdominal collection.  This is similar to minimally improved from the prior study.  A perforation is not completely excluded.  Recommend follow-up.  4. Mildly dilated loop of small bowel in the left abdomen anteriorly with a few air-fluid levels.  See above comments.  Ileus or partial small bowel obstruction is a consideration.  Recommend follow-up.  Interval History: Pt afebrile w/o white count.  Drains in place.  Ostomy bag changed by wound care as was leaking.      Review of Systems   Constitutional: Positive for activity change, appetite change and fatigue. Negative for chills, diaphoresis and fever.   Respiratory: Negative for cough and shortness of breath.    Gastrointestinal: Positive for abdominal distention and abdominal pain. Negative for diarrhea, nausea and vomiting.   Genitourinary: Negative for dysuria.   Skin:  Positive for wound. Negative for color change, pallor and rash.   Neurological: Negative for headaches.   All other systems reviewed and are negative.    Objective:     Vital Signs (Most Recent):  Temp: 98.6 °F (37 °C) (08/14/20 0800)  Pulse: 71 (08/14/20 1140)  Resp: 16 (08/14/20 1141)  BP: (!) 167/86 (08/14/20 0800)  SpO2: 98 % (08/14/20 0800) Vital Signs (24h Range):  Temp:  [97.9 °F (36.6 °C)-98.6 °F (37 °C)] 98.6 °F (37 °C)  Pulse:  [71-91] 71  Resp:  [16-20] 16  SpO2:  [96 %-98 %] 98 %  BP: (135-186)/(73-95) 167/86     Weight: 70.7 kg (155 lb 13.8 oz)  Body mass index is 29.45 kg/m².    Estimated Creatinine Clearance: 53.1 mL/min (based on SCr of 0.9 mg/dL).    Physical Exam  Vitals signs and nursing note reviewed.   Constitutional:       Appearance: She is not toxic-appearing or diaphoretic.   HENT:      Head: Normocephalic.   Cardiovascular:      Rate and Rhythm: Normal rate and regular rhythm.   Pulmonary:      Effort: Pulmonary effort is normal. No respiratory distress.      Breath sounds: Normal breath sounds. No stridor.   Abdominal:      General: Abdomen is flat. There is distension.      Palpations: Abdomen is soft. There is no mass.      Tenderness: There is no abdominal tenderness.      Hernia: No hernia is present.      Comments: Undergoing wound change by wound care of ostomy.   Skin:     General: Skin is warm and dry.      Coloration: Skin is not jaundiced or pale.   Neurological:      General: No focal deficit present.      Mental Status: She is alert and oriented to person, place, and time.   Psychiatric:         Mood and Affect: Mood normal.         Significant Labs: All pertinent labs within the past 24 hours have been reviewed.    Significant Imaging: I have reviewed all pertinent imaging results/findings within the past 24 hours.

## 2020-08-14 NOTE — PROGRESS NOTES
[facility]  Colorectal Surgery  Progress Note    Patient Name: Azucena Morrison  MRN: [id number]  Admission Date: 7/15/2020  Hospital Length of Stay: 30 days  Attending Physician: Fabiana Valiente MD    Subjective:     Interval History:   No acute events over night.  States she did not have a good night.   Did not have much clears overnight. Some nausea on and off.   Ostomy bag replaced multiple times due to leakage around seal - output not recorded. Wound dressings changed multiple times overnight due to ostomy bag leakage.   Drain output 25cc    Post-Op Info:  Procedure(s) (LRB):  RESECTION, COLON, LOW ANTERIOR (N/A)  CYSTOSCOPY (N/A)  CATHETERIZATION, URETER (Bilateral)  CREATION, ILEOSTOMY  LYSIS, ADHESIONS   14 Days Post-Op      Medications:  Continuous Infusions:   dextrose 10 % in water (D10W)      hydromorphone in 0.9 % NaCl 6 mg/30 ml      TPN ADULT CENTRAL LINE CUSTOM 75 mL/hr at 08/13/20 2326    TPN ADULT CENTRAL LINE CUSTOM       Scheduled Meds:   enoxaparin  40 mg Subcutaneous Q24H    fat emulsion 20%  250 mL Intravenous Daily    fat emulsion 20%  250 mL Intravenous Daily    ibuprofen  800 mg Intravenous Q6H    insulin aspart U-100  6 Units Subcutaneous Q24H    insulin aspart U-100  6 Units Subcutaneous Q24H    insulin aspart U-100  6 Units Subcutaneous Q24H    insulin aspart U-100  6 Units Subcutaneous Q24H    insulin aspart U-100  6 Units Subcutaneous Q24H    insulin aspart U-100  6 Units Subcutaneous Q24H    lidocaine  1 patch Transdermal Q24H    meropenem (MERREM) IVPB  2 g Intravenous Q8H    metoprolol  5 mg Intravenous Q6H    pantoprozole (PROTONIX) IV  40 mg Intravenous Q24H    scopolamine  1 patch Transdermal Q3 Days    sodium chloride 0.9%  10 mL Intravenous Q6H     PRN Meds:   sodium chloride    calcium carbonate    dextrose 10 % in water (D10W)    dextrose 50%    glucagon (human recombinant)    HYDROmorphone    HYDROmorphone    insulin aspart  U-100    iohexol    naloxone    ondansetron    ondansetron    promethazine (PHENERGAN) IVPB    sodium chloride 0.9%    sodium chloride 0.9%    traMADoL        Objective:     Vital Signs (Most Recent):  Temp: 98.2 °F (36.8 °C) (08/14/20 0353)  Pulse: 87 (08/14/20 0631)  Resp: 20 (08/14/20 0353)  BP: (!) 175/91 (08/14/20 0631)  SpO2: 98 % (08/14/20 0353) Vital Signs (24h Range):  Temp:  [97.9 °F (36.6 °C)-98.3 °F (36.8 °C)] 98.2 °F (36.8 °C)  Pulse:  [74-91] 87  Resp:  [16-20] 20  SpO2:  [96 %-99 %] 98 %  BP: (135-186)/(73-91) 175/91     Intake/Output - Last 3 Shifts       08/12 0700 - 08/13 0659 08/13 0700 - 08/14 0659    P.O. 60 0    I.V. (mL/kg) 23 (0.3) 57 (0.8)    IV Piggyback 850 1250    TPN 1552.6 1945.4    Total Intake(mL/kg) 2485.6 (35.2) 3252.4 (46)    Urine (mL/kg/hr) 600 (0.4) 2050 (1.2)    Emesis/NG output 0 0    Drains 20 25    Other 0 0    Stool 0 300    Blood 0 0    Total Output 620 2375    Net +1865.6 +877.4          Urine Occurrence 4 x 3 x    Stool Occurrence 1 x 5 x    Emesis Occurrence 0 x 0 x          Physical Exam  Constitutional:       General: She is not in acute distress.  HENT:      Head: Normocephalic.   Eyes:      Conjunctiva/sclera: Conjunctivae normal.   Neck:      Trachea: No tracheal deviation.   Cardiovascular:      Rate and Rhythm: Regular rhythm.   Pulmonary:      Effort: Pulmonary effort is normal. No respiratory distress.   Abdominal:      Comments:   Wet-to-dry dressing in midline wound. Wound bed looks healthy. Some ostomy output in wound bed - cleaned up at bedside.  Ostomy pink. Brown liquid stool and gas in bag.   Left flank IR drain with yellow serous output scant amount purulence in tubing.      Neurological:      Mental Status: She is alert and oriented to person, place, and time.         Significant Labs:  BMP (Last 3 Results):   Recent Labs   Lab 08/12/20  0421 08/13/20  0519 08/14/20  0438   * 106 184*    135* 138   K 4.1 4.3 4.4    102 104    CO2 26 26 25   BUN 15 18 16   CREATININE 0.9 0.8 0.9   CALCIUM 8.9 8.4* 9.0   MG 1.9 1.7 2.1     CBC (Last 3 Results):   Recent Labs   Lab 08/12/20  0421 08/13/20  0519 08/14/20  0438   WBC 10.36 8.77 9.29   RBC 2.81* 2.76* 2.84*   HGB 7.2* 7.1* 7.3*   HCT 23.2* 22.9* 23.5*   * 770* 856*   MCV 83 83 83   MCH 25.6* 25.7* 25.7*   MCHC 31.0* 31.0* 31.1*     Assessment/Plan:     * Cutaneous fistula  70 y.o. female 14 Days Post-Op for Procedure(s) (LRB):  RESECTION, COLON, LOW ANTERIOR (N/A)  CYSTOSCOPY (N/A)  CATHETERIZATION, URETER (Bilateral)  CREATION, ILEOSTOMY  LYSIS, ADHESIONS   IR drain placed 8/6: cultures growing Ecoli sensitive to Ertapenem (also has a UTI with the same Ecoli). Drain upsized  By IR on 8/12  Wound vac placed 8/10, removed 8/11  IV antibiotics switched to meropenem by ID and patient placed on contact precautions due to ESBL. Appreciate ID recommendations.    Continue bariatric clear liquid diet today  Contact Ostomy/Wound care regarding ostomy bag leakage  Added scopolamine patch   Continue TPN   PENNY Cr normal  IV pain meds  SCDs/DVT prophylaxis  GI prophylaxis  Encourage IS  PT/OT. Encourage to chair and ambulation.     Continue communication with use of       On total parenteral nutrition (TPN)  Reorder.    UTI (urinary tract infection)  Cultures growing E. Coli sensitive to ertapenem. First dose given 8/7. Infectious diseases consulted. Switched to meropenem on 8/13 by ID for ESBL E. Coli. Anticipated long course IV antibiotic treatment. Appreciate recommendations.     Acute blood loss anemia  IV ferric gluconate given 8/8. Hb stable this morning at 7.3    Chronic kidney disease, stage 3  Monitor labs  Monitor I&O    Creatinine stable at 0.9. Adequate UOP.    Generalized abdominal pain  Continue IV pain meds      Essential hypertension  Monitor BP  Continue metoprolol 5 mg q6h injections while NPO. Consider restarting home BP meds when diet is able to be advance  diet.     Type 2 diabetes mellitus without complication, with long-term current use of insulin  SSI  On scheduled insulin ordered by Endo team.  Accu check Q6    Endocrinology consulted. Appreciate their recommendations.           Ramonita Osorio MD  Colorectal Surgery  [facility]

## 2020-08-14 NOTE — CARE UPDATE
Rapid Response Respiratory Therapy ETCO2 Check     Time of visit: 740     Code Status: Full Code   : 1950  Bed: 2/1022 A:   MRN: [id number]    SITUATION     Evaluated patient for: ETCO2 Compliance     BACKGROUND     Patient has a past medical history of Chronic kidney disease, stage 3, Diabetes mellitus, Diabetes mellitus, type 2, Hypertension, and UTI (urinary tract infection).    ASSESSMENT     Is the ETCO2 monitor on? (Yes/No)  no   Is the patient wearing a cannula? (Yes/No) no  Are ETCO2 orders placed? (Yes/No) yes  Is the patient on a PCA pump? (Yes/No) yes  ETCO2 monitored: ETCO2 (mmHg): 30 mmHg  O2 Device/Concentration: RA  Pulse: 78 Respiratory rate: 16 Temperature: Temp: 98.6 °F (37 °C) BP: BP: (!) 167/86 SpO2: 98  Level of Consciousness: Level of Consciousness (AVPU): alert  All Lung Field Breath Sounds: All Lung Fields Breath Sounds: Anterior:, Lateral:, clear  MAGED Breath Sounds: crackles, fine, diminished  LLL Breath Sounds: diminished  RUL Breath Sounds: crackles, fine, diminished  RML Breath Sounds: diminished  RLL Breath Sounds: diminished  Ambu at bedside: Ambu bag with the patient?: Yes, Adult Ambu    INTERVENTIONS/RECOMMENDATIONS     Patient noncompliant with EtCO2. RN aware. Patient being cleaned up at time of my arrival. Voiced my concerns and importance of EtCO2 being monitored. Will followup with both patient and RN.     PHYSICIAN ESCALATION     Physician Escalation (Yes/No): no     Care discussed with: n/a  Discussed plan of care primary RT     FOLLOW-UP     Please call back the Rapid Response RT, Sepideh Gusman, RRT at x [phone] for any questions or concerns.

## 2020-08-14 NOTE — ASSESSMENT & PLAN NOTE
70 y.o. female 14 Days Post-Op for Procedure(s) (LRB):  RESECTION, COLON, LOW ANTERIOR (N/A)  CYSTOSCOPY (N/A)  CATHETERIZATION, URETER (Bilateral)  CREATION, ILEOSTOMY  LYSIS, ADHESIONS   IR drain placed 8/6: cultures growing Ecoli sensitive to Ertapenem (also has a UTI with the same Ecoli). Drain upsized  By IR on 8/12  Wound vac placed 8/10, removed 8/11  IV antibiotics switched to meropenem by ID and patient placed on contact precautions due to ESBL. Appreciate ID recommendations.    Continue bariatric clear liquid diet today  Contact Ostomy/Wound care regarding ostomy bag leakage  Added scopolamine patch   Continue TPN   PENNY Cr normal  IV pain meds  SCDs/DVT prophylaxis  GI prophylaxis  Encourage IS  PT/OT. Encourage to chair and ambulation.     Continue communication with use of

## 2020-08-14 NOTE — SUBJECTIVE & OBJECTIVE
"Interval HPI:   Overnight events: NAEON. BG at or slightly above goal ranges on SQ insulin regimen.   Eating:   Bariatric sugar free clears  Nausea: No  Hypoglycemia and intervention: No  Fever: No  TPN and/or TF: Yes  If yes, type of TF/TPN and rate: TPN at 75 cc/hr    BP (!) 167/86 (BP Location: Right arm, Patient Position: Sitting)   Pulse 71   Temp 98.6 °F (37 °C) (Oral)   Resp 16   Ht 5' 1" (1.549 m)   Wt 70.7 kg (155 lb 13.8 oz)   LMP  (LMP Unknown)   SpO2 98%   Breastfeeding No   BMI 29.45 kg/m²     Labs Reviewed and Include    Recent Labs   Lab 08/14/20  0438   *   CALCIUM 9.0   ALBUMIN 1.6*   PROT 7.4      K 4.4   CO2 25      BUN 16   CREATININE 0.9   ALKPHOS 256*   ALT 17   AST 17   BILITOT 0.3     Lab Results   Component Value Date    WBC 9.29 08/14/2020    HGB 7.3 (L) 08/14/2020    HCT 23.5 (L) 08/14/2020    MCV 83 08/14/2020     (H) 08/14/2020     No results for input(s): TSH, FREET4 in the last 168 hours.  Lab Results   Component Value Date    HGBA1C 6.7 (H) 07/09/2020       Nutritional status:   Body mass index is 29.45 kg/m².  Lab Results   Component Value Date    ALBUMIN 1.6 (L) 08/14/2020    ALBUMIN 1.5 (L) 08/13/2020    ALBUMIN 1.5 (L) 08/12/2020     Lab Results   Component Value Date    PREALBUMIN 21 07/15/2020       Estimated Creatinine Clearance: 53.1 mL/min (based on SCr of 0.9 mg/dL).    Accu-Checks  Recent Labs     08/13/20  0025 08/13/20  0438 08/13/20  0717 08/13/20  1158 08/13/20  1551 08/13/20  2034 08/14/20  0046 08/14/20  0456 08/14/20  0822 08/14/20  1222   POCTGLUCOSE 123* 130* 161* 187* 98 152* 149* 222* 226* 196*       Current Medications and/or Treatments Impacting Glycemic Control  Immunotherapy:    Immunosuppressants     None        Steroids:   Hormones (From admission, onward)    None        Pressors:    Autonomic Drugs (From admission, onward)    None        Hyperglycemia/Diabetes Medications:   Antihyperglycemics (From admission, onward)    " Start     Stop Route Frequency Ordered    08/14/20 1600  insulin aspart U-100 pen 6 Units      -- SubQ Every 24 hours (non-standard times) 08/13/20 1629    08/14/20 1200  insulin aspart U-100 pen 6 Units      -- SubQ Every 24 hours (non-standard times) 08/13/20 1629    08/14/20 0800  insulin aspart U-100 pen 6 Units      -- SubQ Every 24 hours (non-standard times) 08/13/20 1629    08/14/20 0400  insulin aspart U-100 pen 6 Units      -- SubQ Every 24 hours (non-standard times) 08/13/20 1629    08/14/20 0000  insulin aspart U-100 pen 6 Units      -- SubQ Every 24 hours (non-standard times) 08/13/20 1629    08/13/20 2000  insulin aspart U-100 pen 6 Units      -- SubQ Every 24 hours (non-standard times) 08/13/20 1629    08/04/20 1409  insulin aspart U-100 pen 1-10 Units      -- SubQ Every 4 hours PRN 08/04/20 1310

## 2020-08-14 NOTE — SUBJECTIVE & OBJECTIVE
Subjective:     Interval History:   No acute events over night.  States she did not have a good night.   Did not have much clears overnight. Some nausea on and off.   Ostomy bag replaced multiple times due to leakage around seal - output not recorded. Wound dressings changed multiple times overnight due to ostomy bag leakage.   Drain output 25cc    Post-Op Info:  Procedure(s) (LRB):  RESECTION, COLON, LOW ANTERIOR (N/A)  CYSTOSCOPY (N/A)  CATHETERIZATION, URETER (Bilateral)  CREATION, ILEOSTOMY  LYSIS, ADHESIONS   14 Days Post-Op      Medications:  Continuous Infusions:   dextrose 10 % in water (D10W)      hydromorphone in 0.9 % NaCl 6 mg/30 ml      TPN ADULT CENTRAL LINE CUSTOM 75 mL/hr at 08/13/20 2326    TPN ADULT CENTRAL LINE CUSTOM       Scheduled Meds:   enoxaparin  40 mg Subcutaneous Q24H    fat emulsion 20%  250 mL Intravenous Daily    fat emulsion 20%  250 mL Intravenous Daily    ibuprofen  800 mg Intravenous Q6H    insulin aspart U-100  6 Units Subcutaneous Q24H    insulin aspart U-100  6 Units Subcutaneous Q24H    insulin aspart U-100  6 Units Subcutaneous Q24H    insulin aspart U-100  6 Units Subcutaneous Q24H    insulin aspart U-100  6 Units Subcutaneous Q24H    insulin aspart U-100  6 Units Subcutaneous Q24H    lidocaine  1 patch Transdermal Q24H    meropenem (MERREM) IVPB  2 g Intravenous Q8H    metoprolol  5 mg Intravenous Q6H    pantoprozole (PROTONIX) IV  40 mg Intravenous Q24H    scopolamine  1 patch Transdermal Q3 Days    sodium chloride 0.9%  10 mL Intravenous Q6H     PRN Meds:   sodium chloride    calcium carbonate    dextrose 10 % in water (D10W)    dextrose 50%    glucagon (human recombinant)    HYDROmorphone    HYDROmorphone    insulin aspart U-100    iohexol    naloxone    ondansetron    ondansetron    promethazine (PHENERGAN) IVPB    sodium chloride 0.9%    sodium chloride 0.9%    traMADoL        Objective:     Vital Signs (Most Recent):  Temp: 98.2 °F  (36.8 °C) (08/14/20 0353)  Pulse: 87 (08/14/20 0631)  Resp: 20 (08/14/20 0353)  BP: (!) 175/91 (08/14/20 0631)  SpO2: 98 % (08/14/20 0353) Vital Signs (24h Range):  Temp:  [97.9 °F (36.6 °C)-98.3 °F (36.8 °C)] 98.2 °F (36.8 °C)  Pulse:  [74-91] 87  Resp:  [16-20] 20  SpO2:  [96 %-99 %] 98 %  BP: (135-186)/(73-91) 175/91     Intake/Output - Last 3 Shifts       08/12 0700 - 08/13 0659 08/13 0700 - 08/14 0659    P.O. 60 0    I.V. (mL/kg) 23 (0.3) 57 (0.8)    IV Piggyback 850 1250    TPN 1552.6 1945.4    Total Intake(mL/kg) 2485.6 (35.2) 3252.4 (46)    Urine (mL/kg/hr) 600 (0.4) 2050 (1.2)    Emesis/NG output 0 0    Drains 20 25    Other 0 0    Stool 0 300    Blood 0 0    Total Output 620 2375    Net +1865.6 +877.4          Urine Occurrence 4 x 3 x    Stool Occurrence 1 x 5 x    Emesis Occurrence 0 x 0 x          Physical Exam  Constitutional:       General: She is not in acute distress.  HENT:      Head: Normocephalic.   Eyes:      Conjunctiva/sclera: Conjunctivae normal.   Neck:      Trachea: No tracheal deviation.   Cardiovascular:      Rate and Rhythm: Regular rhythm.   Pulmonary:      Effort: Pulmonary effort is normal. No respiratory distress.   Abdominal:      Comments:   Wet-to-dry dressing in midline wound. Wound bed looks healthy. Some ostomy output in wound bed - cleaned up at bedside.  Ostomy pink. Brown liquid stool and gas in bag.   Left flank IR drain with yellow serous output scant amount purulence in tubing.      Neurological:      Mental Status: She is alert and oriented to person, place, and time.         Significant Labs:  BMP (Last 3 Results):   Recent Labs   Lab 08/12/20  0421 08/13/20  0519 08/14/20  0438   * 106 184*    135* 138   K 4.1 4.3 4.4    102 104   CO2 26 26 25   BUN 15 18 16   CREATININE 0.9 0.8 0.9   CALCIUM 8.9 8.4* 9.0   MG 1.9 1.7 2.1     CBC (Last 3 Results):   Recent Labs   Lab 08/12/20  0421 08/13/20  0519 08/14/20  0438   WBC 10.36 8.77 9.29   RBC 2.81* 2.76*  2.84*   HGB 7.2* 7.1* 7.3*   HCT 23.2* 22.9* 23.5*   * 770* 856*   MCV 83 83 83   MCH 25.6* 25.7* 25.7*   MCHC 31.0* 31.0* 31.1*

## 2020-08-14 NOTE — PLAN OF CARE
VS stable. Telemetry= NSR 80-70s.Plan of care reviewed with patient questions and concerns addressed and verbalized understanding. All vital signs stable. Getting OOB t o go to the bathroom and BSC multiple times during the night. Ostomy bag leaking, changed four times during he night, unable to measure output. Abdominal midline dressing changed twice during the night. AAOx4.  Safety and fall precautions maintained. Instructed to call before getting up, bed alarm set.  Physically Mobilized to their highest level of functioning.  TPN at 75ml/hr. PCA pump in use. Glucose stable. IR drain with minimal output. Actively progressing towards goals.  See flow sheet for further information.  Will continue to monitor.

## 2020-08-14 NOTE — PROGRESS NOTES
"[facility]  Endocrinology  Progress Note    Admit Date: 7/15/2020     Reason for Consult: Management of T2DM, Hyperglycemia     Surgical Procedure and Date: n/a    Lab Results   Component Value Date    HGBA1C 6.7 (H) 07/09/2020     Diabetes diagnosis year: 2015    Home Diabetes Medications:  Lantus 10 units daily if am BG >180 and Metformin 1g BID    How often checking glucose at home? once daily   BG readings on regimen: 150-low 200s  Hypoglycemia on the regimen?  No  Missed doses on regimen?  No    Diabetes Complications include:     Hyperglycemia    Complicating diabetes co morbidities:   CKD      HPI:   Patient is a 70 y.o. female with a diagnosis of T2DM, HTN, and complicated surgical history, including h/o colon resection and Aiyana procedure, takedown colostomy, colostomy closure, clot of colo cutaneous fistula. She presnts with abdominal pain and vomiting. 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 CT scan from OSH showed partially obstructed small bowel. She was admitted to [facility]. Endocrinology consulted for management of T2DM. Of note, patient's primary language is Setswana.  An  was used for this consult.       Interval HPI:   Overnight events: NAEON. BG at or slightly above goal ranges on SQ insulin regimen.   Eating:   Bariatric sugar free clears  Nausea: No  Hypoglycemia and intervention: No  Fever: No  TPN and/or TF: Yes  If yes, type of TF/TPN and rate: TPN at 75 cc/hr    BP (!) 167/86 (BP Location: Right arm, Patient Position: Sitting)   Pulse 71   Temp 98.6 °F (37 °C) (Oral)   Resp 16   Ht 5' 1" (1.549 m)   Wt 70.7 kg (155 lb 13.8 oz)   LMP  (LMP Unknown)   SpO2 98%   Breastfeeding No   BMI 29.45 kg/m²     Labs Reviewed and Include    Recent Labs   Lab 08/14/20  0438   *   CALCIUM 9.0   ALBUMIN 1.6*   PROT 7.4      K 4.4   CO2 25      BUN 16   CREATININE 0.9   ALKPHOS 256*   ALT 17   AST 17 "   BILITOT 0.3     Lab Results   Component Value Date    WBC 9.29 08/14/2020    HGB 7.3 (L) 08/14/2020    HCT 23.5 (L) 08/14/2020    MCV 83 08/14/2020     (H) 08/14/2020     No results for input(s): TSH, FREET4 in the last 168 hours.  Lab Results   Component Value Date    HGBA1C 6.7 (H) 07/09/2020       Nutritional status:   Body mass index is 29.45 kg/m².  Lab Results   Component Value Date    ALBUMIN 1.6 (L) 08/14/2020    ALBUMIN 1.5 (L) 08/13/2020    ALBUMIN 1.5 (L) 08/12/2020     Lab Results   Component Value Date    PREALBUMIN 21 07/15/2020       Estimated Creatinine Clearance: 53.1 mL/min (based on SCr of 0.9 mg/dL).    Accu-Checks  Recent Labs     08/13/20  0025 08/13/20  0438 08/13/20  0717 08/13/20  1158 08/13/20  1551 08/13/20  2034 08/14/20  0046 08/14/20  0456 08/14/20  0822 08/14/20  1222   POCTGLUCOSE 123* 130* 161* 187* 98 152* 149* 222* 226* 196*       Current Medications and/or Treatments Impacting Glycemic Control  Immunotherapy:    Immunosuppressants     None        Steroids:   Hormones (From admission, onward)    None        Pressors:    Autonomic Drugs (From admission, onward)    None        Hyperglycemia/Diabetes Medications:   Antihyperglycemics (From admission, onward)    Start     Stop Route Frequency Ordered    08/14/20 1600  insulin aspart U-100 pen 6 Units      -- SubQ Every 24 hours (non-standard times) 08/13/20 1629    08/14/20 1200  insulin aspart U-100 pen 6 Units      -- SubQ Every 24 hours (non-standard times) 08/13/20 1629    08/14/20 0800  insulin aspart U-100 pen 6 Units      -- SubQ Every 24 hours (non-standard times) 08/13/20 1629    08/14/20 0400  insulin aspart U-100 pen 6 Units      -- SubQ Every 24 hours (non-standard times) 08/13/20 1629    08/14/20 0000  insulin aspart U-100 pen 6 Units      -- SubQ Every 24 hours (non-standard times) 08/13/20 1629    08/13/20 2000  insulin aspart U-100 pen 6 Units      -- SubQ Every 24 hours (non-standard times) 08/13/20 1629     08/04/20 1409  insulin aspart U-100 pen 1-10 Units      -- SubQ Every 4 hours PRN 08/04/20 1310          ASSESSMENT and PLAN    * Cutaneous fistula  Managed per primary team  Optimize BG control to improve wound healing        Type 2 diabetes mellitus without complication, with long-term current use of insulin  BG goal 140-180    - Increase Novolog to 8 units q 4 hrs while on TPN. HOLD if TPN is stopped for any reason.   - Change to Low Dose Correction Scale PRN BG excursions.   - BG monitoring q 4 hrs while on TPN (coordinate with meals during the day)     ** Please call Endocrine for any BG related issues **  ** Please call Endocrine if TPN is restarted or diet changes **    Discharge plans:  TBD. Please notify endocrinology prior to discharge.       Chronic kidney disease, stage 3  Titrate insulin slowly  Avoid insulin stacking      Essential hypertension  Managed per primary team  Condition may cause insulin resistance             Va Genao NP  Endocrinology  [facility]

## 2020-08-14 NOTE — PLAN OF CARE
Patient is not medically ready for discharge. Waiting for patient to discontinue PCA pump before case management can submit to LTAC.      08/14/20 1120   Discharge Reassessment   Assessment Type Discharge Planning Reassessment   Discharge Plan A Long-term acute care facility (LTAC)   Discharge Plan B Home Health   DME Needed Upon Discharge  walker, rolling   Anticipated Discharge Disposition LTAC   Can the patient/caregiver answer the patient profile reliably? Yes, cognitively intact   Rosy Lynch RN, CM   Ext: [phone]

## 2020-08-15 LAB
ALBUMIN SERPL BCP-MCNC: 1.7 G/DL (ref 3.5–5.2)
ALP SERPL-CCNC: 286 U/L (ref 55–135)
ALT SERPL W/O P-5'-P-CCNC: 25 U/L (ref 10–44)
ANION GAP SERPL CALC-SCNC: 10 MMOL/L (ref 8–16)
AST SERPL-CCNC: 33 U/L (ref 10–40)
BASOPHILS # BLD AUTO: 0.05 K/UL (ref 0–0.2)
BASOPHILS NFR BLD: 0.5 % (ref 0–1.9)
BILIRUB SERPL-MCNC: 0.3 MG/DL (ref 0.1–1)
BUN SERPL-MCNC: 15 MG/DL (ref 8–23)
CALCIUM SERPL-MCNC: 9.3 MG/DL (ref 8.7–10.5)
CHLORIDE SERPL-SCNC: 100 MMOL/L (ref 95–110)
CO2 SERPL-SCNC: 26 MMOL/L (ref 23–29)
CREAT SERPL-MCNC: 0.9 MG/DL (ref 0.5–1.4)
DIFFERENTIAL METHOD: ABNORMAL
EOSINOPHIL # BLD AUTO: 0.5 K/UL (ref 0–0.5)
EOSINOPHIL NFR BLD: 4.7 % (ref 0–8)
ERYTHROCYTE [DISTWIDTH] IN BLOOD BY AUTOMATED COUNT: 17.9 % (ref 11.5–14.5)
EST. GFR  (AFRICAN AMERICAN): >60 ML/MIN/1.73 M^2
EST. GFR  (NON AFRICAN AMERICAN): >60 ML/MIN/1.73 M^2
GLUCOSE SERPL-MCNC: 100 MG/DL (ref 70–110)
HCT VFR BLD AUTO: 25.7 % (ref 37–48.5)
HGB BLD-MCNC: 7.7 G/DL (ref 12–16)
IMM GRANULOCYTES # BLD AUTO: 0.06 K/UL (ref 0–0.04)
IMM GRANULOCYTES NFR BLD AUTO: 0.6 % (ref 0–0.5)
LYMPHOCYTES # BLD AUTO: 1.4 K/UL (ref 1–4.8)
LYMPHOCYTES NFR BLD: 14.2 % (ref 18–48)
MAGNESIUM SERPL-MCNC: 1.9 MG/DL (ref 1.6–2.6)
MCH RBC QN AUTO: 24.9 PG (ref 27–31)
MCHC RBC AUTO-ENTMCNC: 30 G/DL (ref 32–36)
MCV RBC AUTO: 83 FL (ref 82–98)
MONOCYTES # BLD AUTO: 0.7 K/UL (ref 0.3–1)
MONOCYTES NFR BLD: 7 % (ref 4–15)
NEUTROPHILS # BLD AUTO: 7.2 K/UL (ref 1.8–7.7)
NEUTROPHILS NFR BLD: 73 % (ref 38–73)
NRBC BLD-RTO: 0 /100 WBC
PHOSPHATE SERPL-MCNC: 4.4 MG/DL (ref 2.7–4.5)
PLATELET # BLD AUTO: 891 K/UL (ref 150–350)
PMV BLD AUTO: 8.4 FL (ref 9.2–12.9)
POCT GLUCOSE: 158 MG/DL (ref 70–110)
POCT GLUCOSE: 159 MG/DL (ref 70–110)
POCT GLUCOSE: 160 MG/DL (ref 70–110)
POCT GLUCOSE: 176 MG/DL (ref 70–110)
POCT GLUCOSE: 178 MG/DL (ref 70–110)
POTASSIUM SERPL-SCNC: 4.2 MMOL/L (ref 3.5–5.1)
PROT SERPL-MCNC: 7.8 G/DL (ref 6–8.4)
RBC # BLD AUTO: 3.09 M/UL (ref 4–5.4)
SODIUM SERPL-SCNC: 136 MMOL/L (ref 136–145)
WBC # BLD AUTO: 9.84 K/UL (ref 3.9–12.7)

## 2020-08-15 PROCEDURE — 63600175 PHARM REV CODE 636 W HCPCS: Performed by: PHYSICIAN ASSISTANT

## 2020-08-15 PROCEDURE — 94770 HC EXHALED C02 TEST: CPT

## 2020-08-15 PROCEDURE — C9113 INJ PANTOPRAZOLE SODIUM, VIA: HCPCS | Performed by: STUDENT IN AN ORGANIZED HEALTH CARE EDUCATION/TRAINING PROGRAM

## 2020-08-15 PROCEDURE — 80053 COMPREHEN METABOLIC PANEL: CPT

## 2020-08-15 PROCEDURE — 25000003 PHARM REV CODE 250: Performed by: PHYSICIAN ASSISTANT

## 2020-08-15 PROCEDURE — 25000003 PHARM REV CODE 250: Performed by: STUDENT IN AN ORGANIZED HEALTH CARE EDUCATION/TRAINING PROGRAM

## 2020-08-15 PROCEDURE — 85025 COMPLETE CBC W/AUTO DIFF WBC: CPT

## 2020-08-15 PROCEDURE — 84100 ASSAY OF PHOSPHORUS: CPT

## 2020-08-15 PROCEDURE — 63600175 PHARM REV CODE 636 W HCPCS: Performed by: STUDENT IN AN ORGANIZED HEALTH CARE EDUCATION/TRAINING PROGRAM

## 2020-08-15 PROCEDURE — B4185 PARENTERAL SOL 10 GM LIPIDS: HCPCS | Performed by: STUDENT IN AN ORGANIZED HEALTH CARE EDUCATION/TRAINING PROGRAM

## 2020-08-15 PROCEDURE — 20600001 HC STEP DOWN PRIVATE ROOM

## 2020-08-15 PROCEDURE — 36415 COLL VENOUS BLD VENIPUNCTURE: CPT

## 2020-08-15 PROCEDURE — 83735 ASSAY OF MAGNESIUM: CPT

## 2020-08-15 PROCEDURE — 99900035 HC TECH TIME PER 15 MIN (STAT)

## 2020-08-15 PROCEDURE — 99232 PR SUBSEQUENT HOSPITAL CARE,LEVL II: ICD-10-PCS | Mod: ,,, | Performed by: NURSE PRACTITIONER

## 2020-08-15 PROCEDURE — 94761 N-INVAS EAR/PLS OXIMETRY MLT: CPT

## 2020-08-15 PROCEDURE — 99232 SBSQ HOSP IP/OBS MODERATE 35: CPT | Mod: ,,, | Performed by: NURSE PRACTITIONER

## 2020-08-15 PROCEDURE — A4216 STERILE WATER/SALINE, 10 ML: HCPCS | Performed by: STUDENT IN AN ORGANIZED HEALTH CARE EDUCATION/TRAINING PROGRAM

## 2020-08-15 PROCEDURE — A4217 STERILE WATER/SALINE, 500 ML: HCPCS | Performed by: STUDENT IN AN ORGANIZED HEALTH CARE EDUCATION/TRAINING PROGRAM

## 2020-08-15 RX ORDER — GABAPENTIN 300 MG/1
300 CAPSULE ORAL 3 TIMES DAILY
Status: DISCONTINUED | OUTPATIENT
Start: 2020-08-15 | End: 2020-08-21 | Stop reason: HOSPADM

## 2020-08-15 RX ORDER — ACETAMINOPHEN 500 MG
1000 TABLET ORAL EVERY 6 HOURS
Status: DISCONTINUED | OUTPATIENT
Start: 2020-08-15 | End: 2020-08-21 | Stop reason: HOSPADM

## 2020-08-15 RX ADMIN — CARVEDILOL 12.5 MG: 12.5 TABLET, FILM COATED ORAL at 08:08

## 2020-08-15 RX ADMIN — GABAPENTIN 300 MG: 300 CAPSULE ORAL at 03:08

## 2020-08-15 RX ADMIN — INSULIN ASPART 8 UNITS: 100 INJECTION, SOLUTION INTRAVENOUS; SUBCUTANEOUS at 05:08

## 2020-08-15 RX ADMIN — Medication 10 ML: at 05:08

## 2020-08-15 RX ADMIN — MEROPENEM 2 G: 1 INJECTION, POWDER, FOR SOLUTION INTRAVENOUS at 03:08

## 2020-08-15 RX ADMIN — IBUPROFEN 400 MG: 400 TABLET, FILM COATED ORAL at 08:08

## 2020-08-15 RX ADMIN — METOCLOPRAMIDE 10 MG: 5 TABLET ORAL at 12:08

## 2020-08-15 RX ADMIN — Medication 10 ML: at 06:08

## 2020-08-15 RX ADMIN — INSULIN ASPART 8 UNITS: 100 INJECTION, SOLUTION INTRAVENOUS; SUBCUTANEOUS at 04:08

## 2020-08-15 RX ADMIN — METOCLOPRAMIDE 10 MG: 5 TABLET ORAL at 04:08

## 2020-08-15 RX ADMIN — IBUPROFEN 400 MG: 400 TABLET, FILM COATED ORAL at 03:08

## 2020-08-15 RX ADMIN — MEROPENEM 2 G: 1 INJECTION, POWDER, FOR SOLUTION INTRAVENOUS at 09:08

## 2020-08-15 RX ADMIN — INSULIN ASPART 8 UNITS: 100 INJECTION, SOLUTION INTRAVENOUS; SUBCUTANEOUS at 12:08

## 2020-08-15 RX ADMIN — HYDROCHLOROTHIAZIDE 25 MG: 25 TABLET ORAL at 08:08

## 2020-08-15 RX ADMIN — INSULIN ASPART 8 UNITS: 100 INJECTION, SOLUTION INTRAVENOUS; SUBCUTANEOUS at 08:08

## 2020-08-15 RX ADMIN — MAGNESIUM SULFATE HEPTAHYDRATE: 500 INJECTION, SOLUTION INTRAMUSCULAR; INTRAVENOUS at 09:08

## 2020-08-15 RX ADMIN — Medication 10 ML: at 12:08

## 2020-08-15 RX ADMIN — METOCLOPRAMIDE 10 MG: 5 TABLET ORAL at 05:08

## 2020-08-15 RX ADMIN — PANTOPRAZOLE SODIUM 40 MG: 40 INJECTION, POWDER, LYOPHILIZED, FOR SOLUTION INTRAVENOUS at 08:08

## 2020-08-15 RX ADMIN — ENOXAPARIN SODIUM 40 MG: 40 INJECTION SUBCUTANEOUS at 04:08

## 2020-08-15 RX ADMIN — I.V. FAT EMULSION 250 ML: 20 EMULSION INTRAVENOUS at 09:08

## 2020-08-15 RX ADMIN — MEROPENEM 2 G: 1 INJECTION, POWDER, FOR SOLUTION INTRAVENOUS at 08:08

## 2020-08-15 RX ADMIN — ACETAMINOPHEN 1000 MG: 500 TABLET ORAL at 04:08

## 2020-08-15 RX ADMIN — Medication: at 01:08

## 2020-08-15 RX ADMIN — GABAPENTIN 300 MG: 300 CAPSULE ORAL at 08:08

## 2020-08-15 RX ADMIN — ACETAMINOPHEN 1000 MG: 500 TABLET ORAL at 12:08

## 2020-08-15 NOTE — ASSESSMENT & PLAN NOTE
BG goal 140-180    - Novolog 8 units q 4 hrs while on TPN. HOLD if TPN is stopped for any reason.   - Low Dose Correction Scale PRN BG excursions.   - BG monitoring q 4 hrs while on TPN (coordinate with meals during the day)     ** Please call Endocrine for any BG related issues **  ** Please call Endocrine if TPN is restarted or diet changes **    Discharge plans:  TBD. Please notify endocrinology prior to discharge.

## 2020-08-15 NOTE — RESPIRATORY THERAPY
Rapid Response Respiratory Therapy ETCO2 Check     Time of visit: 941     Code Status: Full Code   : 1950  Bed: /2 A:   MRN: [id number]    SITUATION     Evaluated patient for: ETCO2 Compliance     BACKGROUND     Patient has a past medical history of Chronic kidney disease, stage 3, Diabetes mellitus, Diabetes mellitus, type 2, Hypertension, and UTI (urinary tract infection).    ASSESSMENT     Is the ETCO2 monitor on? (Yes/No)  yes   Is the patient wearing a cannula? (Yes/No) yes  Are ETCO2 orders placed? (Yes/No) yes  Is the patient on a PCA pump? (Yes/No) yes  ETCO2 monitored: ETCO2 (mmHg): 28 mmHg  O2 Device/Concentration: room air  Pulse: 90 Respiratory rate: 18 Temperature: Temp: 98.5 °F (36.9 °C) BP: BP: (!) 169/77 SpO2: 99%  Level of Consciousness: Level of Consciousness (AVPU): alert  All Lung Field Breath Sounds: All Lung Fields Breath Sounds: Anterior:, Lateral:, diminished  MAGED Breath Sounds: crackles, fine, diminished  LLL Breath Sounds: diminished  RUL Breath Sounds: crackles, fine, diminished  RML Breath Sounds: diminished  RLL Breath Sounds: diminished  Ambu at bedside: Ambu bag with the patient?: Yes, Adult Ambu    INTERVENTIONS/RECOMMENDATIONS     Continue wearing ETCO2 monitor while on PCA pump    PHYSICIAN ESCALATION     Physician Escalation (Yes/No): no     Discussed plan of care primary RTRANDY     FOLLOW-UP     Please call back the Rapid Response RT, Claudia Mooney, CRT at x [phone] for any questions or concerns.

## 2020-08-15 NOTE — PROGRESS NOTES
"[facility]  Endocrinology  Progress Note    Admit Date: 7/15/2020     Reason for Consult: Management of T2DM, Hyperglycemia     Surgical Procedure and Date: n/a    Lab Results   Component Value Date    HGBA1C 6.7 (H) 07/09/2020     Diabetes diagnosis year: 2015    Home Diabetes Medications:  Lantus 10 units daily if am BG >180 and Metformin 1g BID    How often checking glucose at home? once daily   BG readings on regimen: 150-low 200s  Hypoglycemia on the regimen?  No  Missed doses on regimen?  No    Diabetes Complications include:     Hyperglycemia    Complicating diabetes co morbidities:   CKD      HPI:   Patient is a 70 y.o. female with a diagnosis of T2DM, HTN, and complicated surgical history, including h/o colon resection and Aiyana procedure, takedown colostomy, colostomy closure, clot of colo cutaneous fistula. She presnts with abdominal pain and vomiting. 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 CT scan from OSH showed partially obstructed small bowel. She was admitted to [facility]. Endocrinology consulted for management of T2DM. Of note, patient's primary language is Romanian.  An  was used for this consult.       Interval HPI:   Overnight events: NAEON. BG reasonably controlled on current SQ insulin regimen.   Eating:   <25%  Nausea: No  Hypoglycemia and intervention: No  Fever: No  TPN and/or TF: Yes  If yes, type of TF/TPN and rate: TPN at 75 cc/hr    BP (!) 166/88   Pulse 89   Temp 98.1 °F (36.7 °C)   Resp 18   Ht 5' 1" (1.549 m)   Wt 70.7 kg (155 lb 13.8 oz)   LMP  (LMP Unknown)   SpO2 98%   Breastfeeding No   BMI 29.45 kg/m²     Labs Reviewed and Include    No results for input(s): GLU, CALCIUM, ALBUMIN, PROT, NA, K, CO2, CL, BUN, CREATININE, ALKPHOS, ALT, AST, BILITOT in the last 24 hours.  Lab Results   Component Value Date    WBC 9.29 08/14/2020    HGB 7.3 (L) 08/14/2020    HCT 23.5 (L) 08/14/2020    MCV 83 " 08/14/2020     (H) 08/14/2020     No results for input(s): TSH, FREET4 in the last 168 hours.  Lab Results   Component Value Date    HGBA1C 6.7 (H) 07/09/2020       Nutritional status:   Body mass index is 29.45 kg/m².  Lab Results   Component Value Date    ALBUMIN 1.6 (L) 08/14/2020    ALBUMIN 1.5 (L) 08/13/2020    ALBUMIN 1.5 (L) 08/12/2020     Lab Results   Component Value Date    PREALBUMIN 21 07/15/2020       Estimated Creatinine Clearance: 53.1 mL/min (based on SCr of 0.9 mg/dL).    Accu-Checks  Recent Labs     08/13/20  1551 08/13/20  2034 08/14/20  0046 08/14/20  0456 08/14/20  0822 08/14/20  1222 08/14/20  1623 08/14/20  2033 08/14/20  2331 08/15/20  0453   POCTGLUCOSE 98 152* 149* 222* 226* 196* 155* 169* 80 178*       Current Medications and/or Treatments Impacting Glycemic Control  Immunotherapy:    Immunosuppressants     None        Steroids:   Hormones (From admission, onward)    None        Pressors:    Autonomic Drugs (From admission, onward)    None        Hyperglycemia/Diabetes Medications:   Antihyperglycemics (From admission, onward)    Start     Stop Route Frequency Ordered    08/15/20 1200  insulin aspart U-100 pen 8 Units      -- SubQ Every 24 hours (non-standard times) 08/14/20 1308    08/15/20 0800  insulin aspart U-100 pen 8 Units      -- SubQ Every 24 hours (non-standard times) 08/14/20 1308    08/15/20 0400  insulin aspart U-100 pen 8 Units      -- SubQ Every 24 hours (non-standard times) 08/14/20 1308    08/15/20 0000  insulin aspart U-100 pen 8 Units      -- SubQ Every 24 hours (non-standard times) 08/14/20 1308    08/14/20 2000  insulin aspart U-100 pen 8 Units      -- SubQ Every 24 hours (non-standard times) 08/14/20 1308    08/14/20 1600  insulin aspart U-100 pen 8 Units      -- SubQ Every 24 hours (non-standard times) 08/14/20 1308    08/14/20 1409  insulin aspart U-100 pen 0-5 Units      -- SubQ Every 4 hours PRN 08/14/20 1309          ASSESSMENT and PLAN    * Cutaneous  fistula  Managed per primary team  Optimize BG control to improve wound healing        Type 2 diabetes mellitus without complication, with long-term current use of insulin  BG goal 140-180    - Novolog 8 units q 4 hrs while on TPN. HOLD if TPN is stopped for any reason.   - Low Dose Correction Scale PRN BG excursions.   - BG monitoring q 4 hrs while on TPN (coordinate with meals during the day)     ** Please call Endocrine for any BG related issues **  ** Please call Endocrine if TPN is restarted or diet changes **    Discharge plans:  TBD. Please notify endocrinology prior to discharge.       Chronic kidney disease, stage 3  Titrate insulin slowly  Avoid insulin stacking      Essential hypertension  Managed per primary team  Condition may cause insulin resistance             Va Genao NP  Endocrinology  [facility]

## 2020-08-15 NOTE — PLAN OF CARE
Patient is AAOx3, requires 1 person for assistance. Patient remains on a dilaudid PCA pump and her pain appears to be under control. Midline dressing changed per wound care orders. Midline wound vac CDI with scant ss output noted. LLQ grenade to bulb suction. Patient has a poor appetite and continues to receive TPN and lipids. Monitoring the patient's blood sugar every 4 hours. Patient is NSR on telemetry. Patient on contact isolation and is receiving IV antibiotics. Reminded the patient to call for assistance. Call light and personal items are within reach.

## 2020-08-15 NOTE — PROGRESS NOTES
[facility]  Colorectal Surgery  Progress Note    Patient Name: Azucena Morrison  MRN: [id number]  Admission Date: 7/15/2020  Hospital Length of Stay: 31 days  Attending Physician: Fabiana Valiente MD    Subjective:     Interval History:   No acute events over night.   States that last night was a bad night - she had a lot of pain and wanted pain pills because she would lose the PCA button.   Tolerated a small amount of PO. A little nausea, but no emesis. States she doesn't feel like eating because she is not hungry.   No issues with ostomy bag overnight.     Post-Op Info:  Procedure(s) (LRB):  RESECTION, COLON, LOW ANTERIOR (N/A)  CYSTOSCOPY (N/A)  CATHETERIZATION, URETER (Bilateral)  CREATION, ILEOSTOMY  LYSIS, ADHESIONS   15 Days Post-Op      Medications:  Continuous Infusions:   dextrose 10 % in water (D10W)      hydromorphone in 0.9 % NaCl 6 mg/30 ml      TPN ADULT CENTRAL LINE CUSTOM 75 mL/hr at 08/15/20 0500    TPN ADULT CENTRAL LINE CUSTOM       Scheduled Meds:   acetaminophen  1,000 mg Oral Q6H    carvediloL  12.5 mg Oral BID    enoxaparin  40 mg Subcutaneous Q24H    fat emulsion 20%  250 mL Intravenous Daily    gabapentin  300 mg Oral TID    hydroCHLOROthiazide  25 mg Oral Daily    ibuprofen  400 mg Oral TID    insulin aspart U-100  8 Units Subcutaneous Q24H    insulin aspart U-100  8 Units Subcutaneous Q24H    insulin aspart U-100  8 Units Subcutaneous Q24H    insulin aspart U-100  8 Units Subcutaneous Q24H    insulin aspart U-100  8 Units Subcutaneous Q24H    insulin aspart U-100  8 Units Subcutaneous Q24H    lidocaine  1 patch Transdermal Q24H    meropenem (MERREM) IVPB  2 g Intravenous Q8H    metoclopramide HCl  10 mg Oral TID AC    pantoprozole (PROTONIX) IV  40 mg Intravenous Q24H    scopolamine  1 patch Transdermal Q3 Days    sodium chloride 0.9%  10 mL Intravenous Q6H     PRN Meds:   sodium chloride    calcium carbonate    dextrose 10 % in water (D10W)     dextrose 50%    glucagon (human recombinant)    HYDROmorphone    HYDROmorphone    insulin aspart U-100    iohexol    naloxone    ondansetron    ondansetron    promethazine (PHENERGAN) IVPB    sodium chloride 0.9%    sodium chloride 0.9%    traMADoL        Objective:     Vital Signs (Most Recent):  Temp: 98.5 °F (36.9 °C) (08/15/20 0800)  Pulse: 90 (08/15/20 0800)  Resp: 18 (08/15/20 0800)  BP: (!) 169/77 (08/15/20 0800)  SpO2: 99 % (08/15/20 0800) Vital Signs (24h Range):  Temp:  [97.7 °F (36.5 °C)-98.7 °F (37.1 °C)] 98.5 °F (36.9 °C)  Pulse:  [76-90] 90  Resp:  [16-18] 18  SpO2:  [96 %-99 %] 99 %  BP: (126-169)/(67-88) 169/77     Intake/Output - Last 3 Shifts       08/13 0700 - 08/14 0659 08/14 0700 - 08/15 0659 08/15 0700 - 08/16 0659    P.O. 0 720 180    I.V. (mL/kg) 57 (0.8)      IV Piggyback 1250 300 100    TPN 1945.4 2038.8 244.4    Total Intake(mL/kg) 3252.4 (46) 3058.8 (43.3) 524.4 (7.4)    Urine (mL/kg/hr) 2050 (1.2) 2200 (1.3) 200 (0.6)    Emesis/NG output 0      Drains 25 0     Other 0 0     Stool 300 750 200    Blood 0      Total Output 2375 2950 400    Net +877.4 +108.8 +124.4           Urine Occurrence 3 x      Stool Occurrence 5 x      Emesis Occurrence 0 x            Physical Exam  Constitutional:       General: She is not in acute distress.  HENT:      Head: Normocephalic.   Eyes:      Conjunctiva/sclera: Conjunctivae normal.   Neck:      Trachea: No tracheal deviation.   Cardiovascular:      Rate and Rhythm: Regular rhythm.   Pulmonary:      Effort: Pulmonary effort is normal. No respiratory distress.   Abdominal:      Comments:   Wet-to-dry dressing in midline wound. Wound bed looks healthy. Some ostomy output in wound bed - cleaned up at bedside.  Ostomy pink. Brown liquid stool and gas in bag.   Left flank IR drain with yellow serous output scant amount purulence in tubing.      Neurological:      Mental Status: She is alert and oriented to person, place, and time.          Significant Labs:  BMP (Last 3 Results):   Recent Labs   Lab 08/12/20 0421 08/13/20 0519 08/14/20  0438   * 106 184*    135* 138   K 4.1 4.3 4.4    102 104   CO2 26 26 25   BUN 15 18 16   CREATININE 0.9 0.8 0.9   CALCIUM 8.9 8.4* 9.0   MG 1.9 1.7 2.1     CBC (Last 3 Results):   Recent Labs   Lab 08/12/20 0421 08/13/20 0519 08/14/20  0438   WBC 10.36 8.77 9.29   RBC 2.81* 2.76* 2.84*   HGB 7.2* 7.1* 7.3*   HCT 23.2* 22.9* 23.5*   * 770* 856*   MCV 83 83 83   MCH 25.6* 25.7* 25.7*   MCHC 31.0* 31.0* 31.1*     Assessment/Plan:     * Cutaneous fistula  70 y.o. female 15 Days Post-Op for Procedure(s) (LRB):  RESECTION, COLON, LOW ANTERIOR (N/A)  CYSTOSCOPY (N/A)  CATHETERIZATION, URETER (Bilateral)  CREATION, ILEOSTOMY  LYSIS, ADHESIONS   IR drain placed 8/6: cultures growing Ecoli sensitive to Ertapenem (also has a UTI with the same Ecoli). Drain upsized  By IR on 8/12  Wound vac placed 8/10, removed 8/11  IV antibiotics switched to meropenem by ID and patient placed on contact precautions due to ESBL. Appreciate ID recommendations.    Continue diabetic diet as tolerated  Contact Ostomy/Wound care regarding ostomy bag leakage  Added scopolamine patch   Continue TPN   PENNY Cr normal  IV pain meds. Add PO tylenol and gabapentin   SCDs/DVT prophylaxis  GI prophylaxis  Encourage IS  PT/OT. Encourage to chair and ambulation.     Continue communication with use of       On total parenteral nutrition (TPN)  Reorder.    UTI (urinary tract infection)  Cultures growing E. Coli sensitive to ertapenem. First dose given 8/7. Infectious diseases consulted. Switched to meropenem on 8/13 by ID for ESBL E. Coli. Anticipated long course IV antibiotic treatment. Appreciate recommendations.     Acute blood loss anemia  IV ferric gluconate given 8/8. Hb stable yesterday at 7.3. Follow up morning labs.       Chronic kidney disease, stage 3  Monitor labs  Monitor I&O    Creatinine stable  at 0.9 yesterday. Adequate UOP. Will follow up morning labs.     Generalized abdominal pain  Patient tolerating some PO. Add PO tylenol and gabapentin. Will keep PCA for now.       Essential hypertension  Monitor BP  Continue metoprolol 5 mg q6h injections while NPO. Consider restarting home BP meds when diet is able to be advance diet.     Type 2 diabetes mellitus without complication, with long-term current use of insulin  SSI  On scheduled insulin ordered by Endo team.  Accu check Q6    Endocrinology consulted. Appreciate their recommendations.           Ramonita Osorio MD  Colorectal Surgery  [facility]

## 2020-08-15 NOTE — SUBJECTIVE & OBJECTIVE
"Interval HPI:   Overnight events: NAEON. BG reasonably controlled on current SQ insulin regimen.   Eating:   <25%  Nausea: No  Hypoglycemia and intervention: No  Fever: No  TPN and/or TF: Yes  If yes, type of TF/TPN and rate: TPN at 75 cc/hr    BP (!) 166/88   Pulse 89   Temp 98.1 °F (36.7 °C)   Resp 18   Ht 5' 1" (1.549 m)   Wt 70.7 kg (155 lb 13.8 oz)   LMP  (LMP Unknown)   SpO2 98%   Breastfeeding No   BMI 29.45 kg/m²     Labs Reviewed and Include    No results for input(s): GLU, CALCIUM, ALBUMIN, PROT, NA, K, CO2, CL, BUN, CREATININE, ALKPHOS, ALT, AST, BILITOT in the last 24 hours.  Lab Results   Component Value Date    WBC 9.29 08/14/2020    HGB 7.3 (L) 08/14/2020    HCT 23.5 (L) 08/14/2020    MCV 83 08/14/2020     (H) 08/14/2020     No results for input(s): TSH, FREET4 in the last 168 hours.  Lab Results   Component Value Date    HGBA1C 6.7 (H) 07/09/2020       Nutritional status:   Body mass index is 29.45 kg/m².  Lab Results   Component Value Date    ALBUMIN 1.6 (L) 08/14/2020    ALBUMIN 1.5 (L) 08/13/2020    ALBUMIN 1.5 (L) 08/12/2020     Lab Results   Component Value Date    PREALBUMIN 21 07/15/2020       Estimated Creatinine Clearance: 53.1 mL/min (based on SCr of 0.9 mg/dL).    Accu-Checks  Recent Labs     08/13/20  1551 08/13/20 2034 08/14/20  0046 08/14/20  0456 08/14/20  0822 08/14/20  1222 08/14/20  1623 08/14/20  2033 08/14/20  2331 08/15/20  0453   POCTGLUCOSE 98 152* 149* 222* 226* 196* 155* 169* 80 178*       Current Medications and/or Treatments Impacting Glycemic Control  Immunotherapy:    Immunosuppressants     None        Steroids:   Hormones (From admission, onward)    None        Pressors:    Autonomic Drugs (From admission, onward)    None        Hyperglycemia/Diabetes Medications:   Antihyperglycemics (From admission, onward)    Start     Stop Route Frequency Ordered    08/15/20 1200  insulin aspart U-100 pen 8 Units      -- SubQ Every 24 hours (non-standard times) " 08/14/20 1308    08/15/20 0800  insulin aspart U-100 pen 8 Units      -- SubQ Every 24 hours (non-standard times) 08/14/20 1308    08/15/20 0400  insulin aspart U-100 pen 8 Units      -- SubQ Every 24 hours (non-standard times) 08/14/20 1308    08/15/20 0000  insulin aspart U-100 pen 8 Units      -- SubQ Every 24 hours (non-standard times) 08/14/20 1308    08/14/20 2000  insulin aspart U-100 pen 8 Units      -- SubQ Every 24 hours (non-standard times) 08/14/20 1308    08/14/20 1600  insulin aspart U-100 pen 8 Units      -- SubQ Every 24 hours (non-standard times) 08/14/20 1308    08/14/20 1409  insulin aspart U-100 pen 0-5 Units      -- SubQ Every 4 hours PRN 08/14/20 1309

## 2020-08-15 NOTE — SUBJECTIVE & OBJECTIVE
Subjective:     Interval History:   No acute events over night.   States that last night was a bad night - she had a lot of pain and wanted pain pills because she would lose the PCA button.   Tolerated a small amount of PO. A little nausea, but no emesis. States she doesn't feel like eating because she is not hungry.   No issues with ostomy bag overnight.     Post-Op Info:  Procedure(s) (LRB):  RESECTION, COLON, LOW ANTERIOR (N/A)  CYSTOSCOPY (N/A)  CATHETERIZATION, URETER (Bilateral)  CREATION, ILEOSTOMY  LYSIS, ADHESIONS   15 Days Post-Op      Medications:  Continuous Infusions:   dextrose 10 % in water (D10W)      hydromorphone in 0.9 % NaCl 6 mg/30 ml      TPN ADULT CENTRAL LINE CUSTOM 75 mL/hr at 08/15/20 0500    TPN ADULT CENTRAL LINE CUSTOM       Scheduled Meds:   acetaminophen  1,000 mg Oral Q6H    carvediloL  12.5 mg Oral BID    enoxaparin  40 mg Subcutaneous Q24H    fat emulsion 20%  250 mL Intravenous Daily    gabapentin  300 mg Oral TID    hydroCHLOROthiazide  25 mg Oral Daily    ibuprofen  400 mg Oral TID    insulin aspart U-100  8 Units Subcutaneous Q24H    insulin aspart U-100  8 Units Subcutaneous Q24H    insulin aspart U-100  8 Units Subcutaneous Q24H    insulin aspart U-100  8 Units Subcutaneous Q24H    insulin aspart U-100  8 Units Subcutaneous Q24H    insulin aspart U-100  8 Units Subcutaneous Q24H    lidocaine  1 patch Transdermal Q24H    meropenem (MERREM) IVPB  2 g Intravenous Q8H    metoclopramide HCl  10 mg Oral TID AC    pantoprozole (PROTONIX) IV  40 mg Intravenous Q24H    scopolamine  1 patch Transdermal Q3 Days    sodium chloride 0.9%  10 mL Intravenous Q6H     PRN Meds:   sodium chloride    calcium carbonate    dextrose 10 % in water (D10W)    dextrose 50%    glucagon (human recombinant)    HYDROmorphone    HYDROmorphone    insulin aspart U-100    iohexol    naloxone    ondansetron    ondansetron    promethazine (PHENERGAN) IVPB    sodium chloride  0.9%    sodium chloride 0.9%    traMADoL        Objective:     Vital Signs (Most Recent):  Temp: 98.5 °F (36.9 °C) (08/15/20 0800)  Pulse: 90 (08/15/20 0800)  Resp: 18 (08/15/20 0800)  BP: (!) 169/77 (08/15/20 0800)  SpO2: 99 % (08/15/20 0800) Vital Signs (24h Range):  Temp:  [97.7 °F (36.5 °C)-98.7 °F (37.1 °C)] 98.5 °F (36.9 °C)  Pulse:  [76-90] 90  Resp:  [16-18] 18  SpO2:  [96 %-99 %] 99 %  BP: (126-169)/(67-88) 169/77     Intake/Output - Last 3 Shifts       08/13 0700 - 08/14 0659 08/14 0700 - 08/15 0659 08/15 0700 - 08/16 0659    P.O. 0 720 180    I.V. (mL/kg) 57 (0.8)      IV Piggyback 1250 300 100    TPN 1945.4 2038.8 244.4    Total Intake(mL/kg) 3252.4 (46) 3058.8 (43.3) 524.4 (7.4)    Urine (mL/kg/hr) 2050 (1.2) 2200 (1.3) 200 (0.6)    Emesis/NG output 0      Drains 25 0     Other 0 0     Stool 300 750 200    Blood 0      Total Output 2375 2950 400    Net +877.4 +108.8 +124.4           Urine Occurrence 3 x      Stool Occurrence 5 x      Emesis Occurrence 0 x            Physical Exam  Constitutional:       General: She is not in acute distress.  HENT:      Head: Normocephalic.   Eyes:      Conjunctiva/sclera: Conjunctivae normal.   Neck:      Trachea: No tracheal deviation.   Cardiovascular:      Rate and Rhythm: Regular rhythm.   Pulmonary:      Effort: Pulmonary effort is normal. No respiratory distress.   Abdominal:      Comments:   Wet-to-dry dressing in midline wound. Wound bed looks healthy. Some ostomy output in wound bed - cleaned up at bedside.  Ostomy pink. Brown liquid stool and gas in bag.   Left flank IR drain with yellow serous output scant amount purulence in tubing.      Neurological:      Mental Status: She is alert and oriented to person, place, and time.         Significant Labs:  BMP (Last 3 Results):   Recent Labs   Lab 08/12/20  0421 08/13/20  0519 08/14/20  0438   * 106 184*    135* 138   K 4.1 4.3 4.4    102 104   CO2 26 26 25   BUN 15 18 16   CREATININE 0.9 0.8  0.9   CALCIUM 8.9 8.4* 9.0   MG 1.9 1.7 2.1     CBC (Last 3 Results):   Recent Labs   Lab 08/12/20  0421 08/13/20  0519 08/14/20  0438   WBC 10.36 8.77 9.29   RBC 2.81* 2.76* 2.84*   HGB 7.2* 7.1* 7.3*   HCT 23.2* 22.9* 23.5*   * 770* 856*   MCV 83 83 83   MCH 25.6* 25.7* 25.7*   MCHC 31.0* 31.0* 31.1*

## 2020-08-15 NOTE — ASSESSMENT & PLAN NOTE
70 y.o. female 15 Days Post-Op for Procedure(s) (LRB):  RESECTION, COLON, LOW ANTERIOR (N/A)  CYSTOSCOPY (N/A)  CATHETERIZATION, URETER (Bilateral)  CREATION, ILEOSTOMY  LYSIS, ADHESIONS   IR drain placed 8/6: cultures growing Ecoli sensitive to Ertapenem (also has a UTI with the same Ecoli). Drain upsized  By IR on 8/12  Wound vac placed 8/10, removed 8/11  IV antibiotics switched to meropenem by ID and patient placed on contact precautions due to ESBL. Appreciate ID recommendations.    Continue diabetic diet as tolerated  Contact Ostomy/Wound care regarding ostomy bag leakage  Added scopolamine patch   Continue TPN   PENNY Cr normal  IV pain meds. Add PO tylenol and gabapentin   SCDs/DVT prophylaxis  GI prophylaxis  Encourage IS  PT/OT. Encourage to chair and ambulation.     Continue communication with use of

## 2020-08-15 NOTE — ASSESSMENT & PLAN NOTE
Monitor labs  Monitor I&O    Creatinine stable at 0.9 yesterday. Adequate UOP. Will follow up morning labs.

## 2020-08-16 LAB
ALBUMIN SERPL BCP-MCNC: 1.7 G/DL (ref 3.5–5.2)
ALP SERPL-CCNC: 245 U/L (ref 55–135)
ALT SERPL W/O P-5'-P-CCNC: 20 U/L (ref 10–44)
ANION GAP SERPL CALC-SCNC: 11 MMOL/L (ref 8–16)
AST SERPL-CCNC: 20 U/L (ref 10–40)
BASOPHILS # BLD AUTO: 0.06 K/UL (ref 0–0.2)
BASOPHILS NFR BLD: 0.5 % (ref 0–1.9)
BILIRUB SERPL-MCNC: 0.2 MG/DL (ref 0.1–1)
BUN SERPL-MCNC: 17 MG/DL (ref 8–23)
CALCIUM SERPL-MCNC: 9.4 MG/DL (ref 8.7–10.5)
CHLORIDE SERPL-SCNC: 101 MMOL/L (ref 95–110)
CO2 SERPL-SCNC: 23 MMOL/L (ref 23–29)
CREAT SERPL-MCNC: 0.9 MG/DL (ref 0.5–1.4)
CRP SERPL-MCNC: 144.5 MG/L (ref 0–8.2)
DIFFERENTIAL METHOD: ABNORMAL
EOSINOPHIL # BLD AUTO: 0.5 K/UL (ref 0–0.5)
EOSINOPHIL NFR BLD: 4.3 % (ref 0–8)
ERYTHROCYTE [DISTWIDTH] IN BLOOD BY AUTOMATED COUNT: 17.4 % (ref 11.5–14.5)
EST. GFR  (AFRICAN AMERICAN): >60 ML/MIN/1.73 M^2
EST. GFR  (NON AFRICAN AMERICAN): >60 ML/MIN/1.73 M^2
GLUCOSE SERPL-MCNC: 198 MG/DL (ref 70–110)
HCT VFR BLD AUTO: 25.6 % (ref 37–48.5)
HGB BLD-MCNC: 8 G/DL (ref 12–16)
IMM GRANULOCYTES # BLD AUTO: 0.05 K/UL (ref 0–0.04)
IMM GRANULOCYTES NFR BLD AUTO: 0.4 % (ref 0–0.5)
LYMPHOCYTES # BLD AUTO: 1.5 K/UL (ref 1–4.8)
LYMPHOCYTES NFR BLD: 12.3 % (ref 18–48)
MAGNESIUM SERPL-MCNC: 1.9 MG/DL (ref 1.6–2.6)
MCH RBC QN AUTO: 25.6 PG (ref 27–31)
MCHC RBC AUTO-ENTMCNC: 31.3 G/DL (ref 32–36)
MCV RBC AUTO: 82 FL (ref 82–98)
MONOCYTES # BLD AUTO: 0.7 K/UL (ref 0.3–1)
MONOCYTES NFR BLD: 6.2 % (ref 4–15)
NEUTROPHILS # BLD AUTO: 9 K/UL (ref 1.8–7.7)
NEUTROPHILS NFR BLD: 76.3 % (ref 38–73)
NRBC BLD-RTO: 0 /100 WBC
PHOSPHATE SERPL-MCNC: 2.8 MG/DL (ref 2.7–4.5)
PLATELET # BLD AUTO: 854 K/UL (ref 150–350)
PMV BLD AUTO: 8.5 FL (ref 9.2–12.9)
POCT GLUCOSE: 119 MG/DL (ref 70–110)
POCT GLUCOSE: 138 MG/DL (ref 70–110)
POCT GLUCOSE: 140 MG/DL (ref 70–110)
POCT GLUCOSE: 187 MG/DL (ref 70–110)
POCT GLUCOSE: 196 MG/DL (ref 70–110)
POCT GLUCOSE: 197 MG/DL (ref 70–110)
POTASSIUM SERPL-SCNC: 4.4 MMOL/L (ref 3.5–5.1)
PROT SERPL-MCNC: 8.4 G/DL (ref 6–8.4)
RBC # BLD AUTO: 3.13 M/UL (ref 4–5.4)
SODIUM SERPL-SCNC: 135 MMOL/L (ref 136–145)
WBC # BLD AUTO: 11.77 K/UL (ref 3.9–12.7)

## 2020-08-16 PROCEDURE — 85025 COMPLETE CBC W/AUTO DIFF WBC: CPT

## 2020-08-16 PROCEDURE — 20600001 HC STEP DOWN PRIVATE ROOM

## 2020-08-16 PROCEDURE — 25000003 PHARM REV CODE 250: Performed by: STUDENT IN AN ORGANIZED HEALTH CARE EDUCATION/TRAINING PROGRAM

## 2020-08-16 PROCEDURE — B4185 PARENTERAL SOL 10 GM LIPIDS: HCPCS | Performed by: STUDENT IN AN ORGANIZED HEALTH CARE EDUCATION/TRAINING PROGRAM

## 2020-08-16 PROCEDURE — 25500020 PHARM REV CODE 255: Performed by: STUDENT IN AN ORGANIZED HEALTH CARE EDUCATION/TRAINING PROGRAM

## 2020-08-16 PROCEDURE — 63600175 PHARM REV CODE 636 W HCPCS: Performed by: STUDENT IN AN ORGANIZED HEALTH CARE EDUCATION/TRAINING PROGRAM

## 2020-08-16 PROCEDURE — 83735 ASSAY OF MAGNESIUM: CPT

## 2020-08-16 PROCEDURE — 63600175 PHARM REV CODE 636 W HCPCS: Performed by: PHYSICIAN ASSISTANT

## 2020-08-16 PROCEDURE — 25000003 PHARM REV CODE 250: Performed by: PHYSICIAN ASSISTANT

## 2020-08-16 PROCEDURE — A4216 STERILE WATER/SALINE, 10 ML: HCPCS | Performed by: STUDENT IN AN ORGANIZED HEALTH CARE EDUCATION/TRAINING PROGRAM

## 2020-08-16 PROCEDURE — 63600175 PHARM REV CODE 636 W HCPCS: Performed by: NURSE PRACTITIONER

## 2020-08-16 PROCEDURE — 97110 THERAPEUTIC EXERCISES: CPT | Mod: CQ

## 2020-08-16 PROCEDURE — 80053 COMPREHEN METABOLIC PANEL: CPT

## 2020-08-16 PROCEDURE — C9113 INJ PANTOPRAZOLE SODIUM, VIA: HCPCS | Performed by: STUDENT IN AN ORGANIZED HEALTH CARE EDUCATION/TRAINING PROGRAM

## 2020-08-16 PROCEDURE — 86140 C-REACTIVE PROTEIN: CPT

## 2020-08-16 PROCEDURE — 94761 N-INVAS EAR/PLS OXIMETRY MLT: CPT

## 2020-08-16 PROCEDURE — A4217 STERILE WATER/SALINE, 500 ML: HCPCS | Performed by: STUDENT IN AN ORGANIZED HEALTH CARE EDUCATION/TRAINING PROGRAM

## 2020-08-16 PROCEDURE — 97530 THERAPEUTIC ACTIVITIES: CPT | Mod: CQ

## 2020-08-16 PROCEDURE — 99232 SBSQ HOSP IP/OBS MODERATE 35: CPT | Mod: ,,, | Performed by: NURSE PRACTITIONER

## 2020-08-16 PROCEDURE — 84100 ASSAY OF PHOSPHORUS: CPT

## 2020-08-16 PROCEDURE — 99232 PR SUBSEQUENT HOSPITAL CARE,LEVL II: ICD-10-PCS | Mod: ,,, | Performed by: NURSE PRACTITIONER

## 2020-08-16 RX ORDER — OXYCODONE HYDROCHLORIDE 10 MG/1
10 TABLET ORAL EVERY 4 HOURS PRN
Status: DISCONTINUED | OUTPATIENT
Start: 2020-08-16 | End: 2020-08-21 | Stop reason: HOSPADM

## 2020-08-16 RX ORDER — OXYCODONE HYDROCHLORIDE 5 MG/1
5 TABLET ORAL EVERY 4 HOURS PRN
Status: DISCONTINUED | OUTPATIENT
Start: 2020-08-16 | End: 2020-08-21 | Stop reason: HOSPADM

## 2020-08-16 RX ADMIN — GABAPENTIN 300 MG: 300 CAPSULE ORAL at 02:08

## 2020-08-16 RX ADMIN — IBUPROFEN 400 MG: 400 TABLET, FILM COATED ORAL at 09:08

## 2020-08-16 RX ADMIN — OXYCODONE HYDROCHLORIDE 10 MG: 10 TABLET ORAL at 05:08

## 2020-08-16 RX ADMIN — GABAPENTIN 300 MG: 300 CAPSULE ORAL at 09:08

## 2020-08-16 RX ADMIN — HYDROCHLOROTHIAZIDE 25 MG: 25 TABLET ORAL at 09:08

## 2020-08-16 RX ADMIN — PANTOPRAZOLE SODIUM 40 MG: 40 INJECTION, POWDER, LYOPHILIZED, FOR SOLUTION INTRAVENOUS at 09:08

## 2020-08-16 RX ADMIN — IOHEXOL 75 ML: 350 INJECTION, SOLUTION INTRAVENOUS at 11:08

## 2020-08-16 RX ADMIN — INSULIN ASPART 8 UNITS: 100 INJECTION, SOLUTION INTRAVENOUS; SUBCUTANEOUS at 12:08

## 2020-08-16 RX ADMIN — Medication 10 ML: at 12:08

## 2020-08-16 RX ADMIN — CARVEDILOL 12.5 MG: 12.5 TABLET, FILM COATED ORAL at 09:08

## 2020-08-16 RX ADMIN — METOCLOPRAMIDE 10 MG: 5 TABLET ORAL at 11:08

## 2020-08-16 RX ADMIN — INSULIN ASPART 8 UNITS: 100 INJECTION, SOLUTION INTRAVENOUS; SUBCUTANEOUS at 07:08

## 2020-08-16 RX ADMIN — Medication: at 09:08

## 2020-08-16 RX ADMIN — MEROPENEM 2 G: 1 INJECTION, POWDER, FOR SOLUTION INTRAVENOUS at 09:08

## 2020-08-16 RX ADMIN — INSULIN ASPART 8 UNITS: 100 INJECTION, SOLUTION INTRAVENOUS; SUBCUTANEOUS at 04:08

## 2020-08-16 RX ADMIN — ENOXAPARIN SODIUM 40 MG: 40 INJECTION SUBCUTANEOUS at 05:08

## 2020-08-16 RX ADMIN — METOCLOPRAMIDE 10 MG: 5 TABLET ORAL at 04:08

## 2020-08-16 RX ADMIN — HYDROMORPHONE HYDROCHLORIDE 0.5 MG: 1 INJECTION, SOLUTION INTRAMUSCULAR; INTRAVENOUS; SUBCUTANEOUS at 08:08

## 2020-08-16 RX ADMIN — I.V. FAT EMULSION 250 ML: 20 EMULSION INTRAVENOUS at 09:08

## 2020-08-16 RX ADMIN — SCOPALAMINE 1 PATCH: 1 PATCH, EXTENDED RELEASE TRANSDERMAL at 09:08

## 2020-08-16 RX ADMIN — METOCLOPRAMIDE 10 MG: 5 TABLET ORAL at 05:08

## 2020-08-16 RX ADMIN — ACETAMINOPHEN 1000 MG: 500 TABLET ORAL at 05:08

## 2020-08-16 RX ADMIN — ACETAMINOPHEN 1000 MG: 500 TABLET ORAL at 11:08

## 2020-08-16 RX ADMIN — ACETAMINOPHEN 1000 MG: 500 TABLET ORAL at 04:08

## 2020-08-16 RX ADMIN — MEROPENEM 2 G: 1 INJECTION, POWDER, FOR SOLUTION INTRAVENOUS at 02:08

## 2020-08-16 RX ADMIN — IOHEXOL 15 ML: 350 INJECTION, SOLUTION INTRAVENOUS at 09:08

## 2020-08-16 RX ADMIN — MAGNESIUM SULFATE HEPTAHYDRATE: 500 INJECTION, SOLUTION INTRAMUSCULAR; INTRAVENOUS at 09:08

## 2020-08-16 RX ADMIN — LIDOCAINE 1 PATCH: 50 PATCH TOPICAL at 05:08

## 2020-08-16 RX ADMIN — IBUPROFEN 400 MG: 400 TABLET, FILM COATED ORAL at 02:08

## 2020-08-16 RX ADMIN — Medication 10 ML: at 05:08

## 2020-08-16 RX ADMIN — IOHEXOL 15 ML: 350 INJECTION, SOLUTION INTRAVENOUS at 10:08

## 2020-08-16 RX ADMIN — INSULIN ASPART 8 UNITS: 100 INJECTION, SOLUTION INTRAVENOUS; SUBCUTANEOUS at 05:08

## 2020-08-16 RX ADMIN — INSULIN ASPART 8 UNITS: 100 INJECTION, SOLUTION INTRAVENOUS; SUBCUTANEOUS at 08:08

## 2020-08-16 NOTE — SUBJECTIVE & OBJECTIVE
Subjective:     Interval History:   GREGORY overnight. Pain improved with addition of PO medications. Tolerated small amount of PO, mainly boost. Denies fever, chills, CP, SOB, nausea, or vomiting. IR drain with minimal output.    Post-Op Info:  Procedure(s) (LRB):  RESECTION, COLON, LOW ANTERIOR (N/A)  CYSTOSCOPY (N/A)  CATHETERIZATION, URETER (Bilateral)  CREATION, ILEOSTOMY  LYSIS, ADHESIONS   16 Days Post-Op      Medications:  Continuous Infusions:   dextrose 10 % in water (D10W)      TPN ADULT CENTRAL LINE CUSTOM 75 mL/hr at 08/15/20 2135    TPN ADULT CENTRAL LINE CUSTOM       Scheduled Meds:   acetaminophen  1,000 mg Oral Q6H    carvediloL  12.5 mg Oral BID    enoxaparin  40 mg Subcutaneous Q24H    fat emulsion 20%  250 mL Intravenous Daily    gabapentin  300 mg Oral TID    hydroCHLOROthiazide  25 mg Oral Daily    ibuprofen  400 mg Oral TID    insulin aspart U-100  8 Units Subcutaneous Q24H    insulin aspart U-100  8 Units Subcutaneous Q24H    insulin aspart U-100  8 Units Subcutaneous Q24H    insulin aspart U-100  8 Units Subcutaneous Q24H    insulin aspart U-100  8 Units Subcutaneous Q24H    insulin aspart U-100  8 Units Subcutaneous Q24H    lidocaine  1 patch Transdermal Q24H    meropenem (MERREM) IVPB  2 g Intravenous Q8H    metoclopramide HCl  10 mg Oral TID AC    pantoprozole (PROTONIX) IV  40 mg Intravenous Q24H    scopolamine  1 patch Transdermal Q3 Days    sodium chloride 0.9%  10 mL Intravenous Q6H     PRN Meds:   sodium chloride    calcium carbonate    dextrose 10 % in water (D10W)    dextrose 50%    glucagon (human recombinant)    HYDROmorphone    HYDROmorphone    insulin aspart U-100    iohexol    naloxone    ondansetron    ondansetron    oxyCODONE    oxyCODONE    promethazine (PHENERGAN) IVPB    sodium chloride 0.9%    sodium chloride 0.9%    traMADoL        Objective:     Vital Signs (Most Recent):  Temp: 98.9 °F (37.2 °C) (08/16/20 0736)  Pulse: 85  (08/16/20 0736)  Resp: 18 (08/16/20 0915)  BP: (!) 164/76 (08/16/20 0736)  SpO2: 98 % (08/16/20 0736) Vital Signs (24h Range):  Temp:  [98.5 °F (36.9 °C)-98.9 °F (37.2 °C)] 98.9 °F (37.2 °C)  Pulse:  [74-92] 85  Resp:  [12-18] 18  SpO2:  [97 %-100 %] 98 %  BP: (136-164)/(69-79) 164/76     Intake/Output - Last 3 Shifts       08/14 0700 - 08/15 0659 08/15 0700 - 08/16 0659 08/16 0700 - 08/17 0659    P.O. 720 360     I.V. (mL/kg)       IV Piggyback 300 300     TPN 2038.8 2103.2 515.7    Total Intake(mL/kg) 3058.8 (43.3) 2763.2 (39.1) 515.7 (7.3)    Urine (mL/kg/hr) 2200 (1.3) 3000 (1.8)     Emesis/NG output       Drains 0 0 0    Other 0 0 0    Stool 750 850 175    Blood       Total Output 2950 3850 175    Net +108.8 -1086.8 +340.7           Stool Occurrence   0 x          Physical Exam  Constitutional:       General: She is not in acute distress.  HENT:      Head: Normocephalic.   Eyes:      Conjunctiva/sclera: Conjunctivae normal.   Neck:      Trachea: No tracheal deviation.   Cardiovascular:      Rate and Rhythm: Regular rhythm.   Pulmonary:      Effort: Pulmonary effort is normal. No respiratory distress.   Abdominal:      Comments:   Midline wound healing well, ostomy pink/viable with stool in bag, maintaining seal, L flank IR drain with minimal output  Neurological:      Mental Status: She is alert and oriented to person, place, and time.

## 2020-08-16 NOTE — PLAN OF CARE
Problem: Physical Therapy Goal  Goal: Physical Therapy Goal  Description: Goals to be met by: 20     Patient will increase functional independence with mobility by performin. Supine to sit with Contact Guard Assistance.  2. Sit to supine with Modified [place].  3. Sit to stand transfer with Modified [place].  4. Bed to chair transfer with Stand-by Assistance using Rolling Walker PRN.  5. Gait  x 100 feet with Stand-by Assistance using Rolling Walker PRN.    6. Ascend/descend 4 stair with bilateral Handrails Stand-by Assistance.   7. Lower extremity exercise program x 20 reps per handout, with assistance as needed.    Outcome: Ongoing, Progressing.  Progress was limited today due to a severe headache and not due to patient's ability.

## 2020-08-16 NOTE — NURSING
WV to abdominal incision occluded - several attempts to troubleshoot were made and unsuccessful. Wound vac removed and wet to dry dressing placed - incision packed with curlex and covered with abd pad. 2 other abdominal incisions changed at this time as well.     MD Matthew notified of this. No new orders.

## 2020-08-16 NOTE — ASSESSMENT & PLAN NOTE
70 y.o. female 16 Days Post-Op for Procedure(s) (LRB):  RESECTION, COLON, LOW ANTERIOR (N/A)  CYSTOSCOPY (N/A)  CATHETERIZATION, URETER (Bilateral)  CREATION, ILEOSTOMY  LYSIS, ADHESIONS   IR drain placed 8/6: cultures growing Ecoli sensitive to Ertapenem (also has a UTI with the same Ecoli). Drain upsized  By IR on 8/12  Wound vac placed 8/10, removed 8/11  IV antibiotics switched to meropenem by ID and patient placed on contact precautions due to ESBL. Appreciate ID recommendations.    Continue diabetic diet as tolerated  Contact Ostomy/Wound care regarding ostomy bag leakage  Added scopolamine patch   Continue TPN   PENNY Cr normal  SCDs/DVT prophylaxis  GI prophylaxis  Encourage IS  PT/OT. Encourage to chair and ambulation.     Continue communication with use of

## 2020-08-16 NOTE — ASSESSMENT & PLAN NOTE
Continue standing tylenol, ibuprofen, gabapentin  PCA discontinued  PO oxycodone PRN for pain control    CT A/P today with PO and IV contrast to assess for undrained fluid collection  NPO at MN in case of need for IR procedure today, if no drainable collection noted on imaging, will continue diet

## 2020-08-16 NOTE — PT/OT/SLP PROGRESS
Physical Therapy Treatment    Patient Name:  Azucena Morrison   MRN:  [id number]    Recommendations:     Discharge Recommendations:  home health PT   Discharge Equipment Recommendations: walker, rolling   Barriers to discharge: None    Assessment:     Azucena Morrison is a 70 y.o. female admitted with a medical diagnosis of Cutaneous fistula.  She presents with the following impairments/functional limitations:  weakness, impaired endurance, gait instability, pain, impaired skin . Patient with limited tolerance to activities due to severe head ache..    Rehab Prognosis: Good; patient would benefit from acute skilled PT services to address these deficits and reach maximum level of function.    Recent Surgery: Procedure(s) (LRB):  RESECTION, COLON, LOW ANTERIOR (N/A)  CYSTOSCOPY (N/A)  CATHETERIZATION, URETER (Bilateral)  CREATION, ILEOSTOMY  LYSIS, ADHESIONS 16 Days Post-Op    Plan:     During this hospitalization, patient to be seen 3 x/week to address the identified rehab impairments via gait training, therapeutic activities, therapeutic exercises, neuromuscular re-education and progress toward the following goals:    · Plan of Care Expires:  08/29/20    Subjective     Chief Complaint: severe headache  Patient/Family Comments/goals: to heal well and to go home.  Pain/Comfort:  · Pain Rating 1: 9/10  · Location - Orientation 1: generalized  · Location 1: head  · Pain Addressed 1: Reposition, Distraction, Cessation of Activity, Nurse notified  · Pain Rating Post-Intervention 1: 9/10      Objective:     Communicated with NSG prior to session.  Patient found HOB elevated with wound vac, telemetry, peripheral IV upon PT entry to room.     General Precautions: Standard, contact, fall   Orthopedic Precautions:N/A   Braces: N/A     Functional Mobility:  · Bed Mobility:     · Rolling Left:  stand by assistance  · Scooting: stand by assistance  · Supine to Sit: stand by assistance  · Sit to Supine: stand by  assistance      AM-PAC 6 CLICK MOBILITY  Turning over in bed (including adjusting bedclothes, sheets and blankets)?: 4  Sitting down on and standing up from a chair with arms (e.g., wheelchair, bedside commode, etc.): 3  Moving from lying on back to sitting on the side of the bed?: 4  Moving to and from a bed to a chair (including a wheelchair)?: 3  Climbing 3-5 steps with a railing?: 3       Therapeutic Activities and Exercises:   Patient sat at EOB x 4 minutes only due to severe headache, but had to transfer back to supine. Patient performed there ex in supine: HS, HIP ABD, SLR, HIP INT/EXTERNAL ROTATION and AP 2x15 reps each.    Patient left HOB elevated with all lines intact, call button in reach and NSG present..    GOALS:   Multidisciplinary Problems     Physical Therapy Goals        Problem: Physical Therapy Goal    Goal Priority Disciplines Outcome Goal Variances Interventions   Physical Therapy Goal     PT, PT/OT Ongoing, Progressing     Description: Goals to be met by: 20     Patient will increase functional independence with mobility by performin. Supine to sit with Contact Guard Assistance.  2. Sit to supine with Modified [place].  3. Sit to stand transfer with Modified [place].  4. Bed to chair transfer with Stand-by Assistance using Rolling Walker PRN.  5. Gait  x 100 feet with Stand-by Assistance using Rolling Walker PRN.    6. Ascend/descend 4 stair with bilateral Handrails Stand-by Assistance.   7. Lower extremity exercise program x 20 reps per handout, with assistance as needed.                     Time Tracking:     PT Received On: 20  PT Start Time:      PT Stop Time: 1435  PT Total Time (min): 23 min     Billable Minutes: Therapeutic Activity 8 and Therapeutic Exercise 15    Treatment Type: Treatment  PT/PTA: PTA     PTA Visit Number: 4     Harsh Peres, PTA  2020

## 2020-08-16 NOTE — PLAN OF CARE
Pt AAOx4, VSS, tele in place - NSR noted, sats stable on RA, afebrile. CT abdomen/pelvis ordered today. Pt to be NPO at MN for possible intervention after CT. PCA d/c this shift - adequate pain control reported by pt with PRN medications. RLQ ostomy CDI - pt does not assist with emptying - thin green/brown output. Midline incision CDI - dsg changed per orders. Midline wound vac @ 125 continuous suction - CDI with scant ss drainage - positional. LLQ grenade to bulb suction - minimal ss output noted. SUNG DL PICC CDI - flushing lumens q6h. TPN continued @ 75. BG monitored q4h - pt remains with no appetite and intermittent nausea - relief reported with PRN medications. Pt up to bedside commode with 1 person assistance - see flowsheet for UO. Continuing to encourage frequent position changes. Pt took partial bath with minimal assistance - bathing wipes used as pt has CHG allergy - linens changed. Pt instructed to use call light for assistance - pt demonstrated and verbalized understanding - call bell placed within pt reach. Safety precautions maintained throughout shift - non skid footwear used while out of bed. Pt in no apparent distress - will continue to monitor pt, proactively round on pt, and adjust care as needed.

## 2020-08-16 NOTE — SUBJECTIVE & OBJECTIVE
"Interval HPI:   Overnight events: NAEON. BG reasonably controlled on current SQ insulin regimen. Diet progressed to diabetic. Remains on TPN.   Eating:   <25%  Nausea: No  Hypoglycemia and intervention: No  Fever: No  TPN and/or TF: Yes  If yes, type of TF/TPN and rate: TPN at 75 cc/hr     BP (!) 164/76 (BP Location: Right arm, Patient Position: Lying)   Pulse 85   Temp 98.9 °F (37.2 °C) (Oral)   Resp 16   Ht 5' 1" (1.549 m)   Wt 70.7 kg (155 lb 13.8 oz)   LMP  (LMP Unknown)   SpO2 98%   Breastfeeding No   BMI 29.45 kg/m²     Labs Reviewed and Include    Recent Labs   Lab 08/16/20  0621   *   CALCIUM 9.4   ALBUMIN 1.7*   PROT 8.4   *   K 4.4   CO2 23      BUN 17   CREATININE 0.9   ALKPHOS 245*   ALT 20   AST 20   BILITOT 0.2     Lab Results   Component Value Date    WBC 11.77 08/16/2020    HGB 8.0 (L) 08/16/2020    HCT 25.6 (L) 08/16/2020    MCV 82 08/16/2020     (H) 08/16/2020     No results for input(s): TSH, FREET4 in the last 168 hours.  Lab Results   Component Value Date    HGBA1C 6.7 (H) 07/09/2020       Nutritional status:   Body mass index is 29.45 kg/m².  Lab Results   Component Value Date    ALBUMIN 1.7 (L) 08/16/2020    ALBUMIN 1.7 (L) 08/15/2020    ALBUMIN 1.6 (L) 08/14/2020     Lab Results   Component Value Date    PREALBUMIN 21 07/15/2020       Estimated Creatinine Clearance: 53.1 mL/min (based on SCr of 0.9 mg/dL).    Accu-Checks  Recent Labs     08/14/20  2033 08/14/20  2331 08/15/20  0453 08/15/20  0851 08/15/20  1205 08/15/20  1648 08/15/20  2009 08/16/20  0004 08/16/20  0419 08/16/20  0745   POCTGLUCOSE 169* 80 178* 176* 158* 160* 159* 138* 196* 187*       Current Medications and/or Treatments Impacting Glycemic Control  Immunotherapy:    Immunosuppressants     None        Steroids:   Hormones (From admission, onward)    None        Pressors:    Autonomic Drugs (From admission, onward)    None        Hyperglycemia/Diabetes Medications:   Antihyperglycemics (From " admission, onward)    Start     Stop Route Frequency Ordered    08/15/20 1200  insulin aspart U-100 pen 8 Units      -- SubQ Every 24 hours (non-standard times) 08/14/20 1308    08/15/20 0800  insulin aspart U-100 pen 8 Units      -- SubQ Every 24 hours (non-standard times) 08/14/20 1308    08/15/20 0400  insulin aspart U-100 pen 8 Units      -- SubQ Every 24 hours (non-standard times) 08/14/20 1308    08/15/20 0000  insulin aspart U-100 pen 8 Units      -- SubQ Every 24 hours (non-standard times) 08/14/20 1308    08/14/20 2000  insulin aspart U-100 pen 8 Units      -- SubQ Every 24 hours (non-standard times) 08/14/20 1308    08/14/20 1600  insulin aspart U-100 pen 8 Units      -- SubQ Every 24 hours (non-standard times) 08/14/20 1308    08/14/20 1409  insulin aspart U-100 pen 0-5 Units      -- SubQ Every 4 hours PRN 08/14/20 1309

## 2020-08-16 NOTE — PROGRESS NOTES
"[facility]  Endocrinology  Progress Note    Admit Date: 7/15/2020     Reason for Consult: Management of T2DM, Hyperglycemia     Surgical Procedure and Date: n/a    Lab Results   Component Value Date    HGBA1C 6.7 (H) 07/09/2020     Diabetes diagnosis year: 2015    Home Diabetes Medications:  Lantus 10 units daily if am BG >180 and Metformin 1g BID    How often checking glucose at home? once daily   BG readings on regimen: 150-low 200s  Hypoglycemia on the regimen?  No  Missed doses on regimen?  No    Diabetes Complications include:     Hyperglycemia    Complicating diabetes co morbidities:   CKD      HPI:   Patient is a 70 y.o. female with a diagnosis of T2DM, HTN, and complicated surgical history, including h/o colon resection and Aiyana procedure, takedown colostomy, colostomy closure, clot of colo cutaneous fistula. She presnts with abdominal pain and vomiting. 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 CT scan from OSH showed partially obstructed small bowel. She was admitted to [facility]. Endocrinology consulted for management of T2DM. Of note, patient's primary language is Beninese.  An  was used for this consult.       Interval HPI:   Overnight events: NAEON. BG reasonably controlled on current SQ insulin regimen. Diet progressed to diabetic. Remains on TPN.   Eating:   <25%  Nausea: No  Hypoglycemia and intervention: No  Fever: No  TPN and/or TF: Yes  If yes, type of TF/TPN and rate: TPN at 75 cc/hr     BP (!) 164/76 (BP Location: Right arm, Patient Position: Lying)   Pulse 85   Temp 98.9 °F (37.2 °C) (Oral)   Resp 16   Ht 5' 1" (1.549 m)   Wt 70.7 kg (155 lb 13.8 oz)   LMP  (LMP Unknown)   SpO2 98%   Breastfeeding No   BMI 29.45 kg/m²     Labs Reviewed and Include    Recent Labs   Lab 08/16/20  0621   *   CALCIUM 9.4   ALBUMIN 1.7*   PROT 8.4   *   K 4.4   CO2 23      BUN 17   CREATININE 0.9   ALKPHOS " 245*   ALT 20   AST 20   BILITOT 0.2     Lab Results   Component Value Date    WBC 11.77 08/16/2020    HGB 8.0 (L) 08/16/2020    HCT 25.6 (L) 08/16/2020    MCV 82 08/16/2020     (H) 08/16/2020     No results for input(s): TSH, FREET4 in the last 168 hours.  Lab Results   Component Value Date    HGBA1C 6.7 (H) 07/09/2020       Nutritional status:   Body mass index is 29.45 kg/m².  Lab Results   Component Value Date    ALBUMIN 1.7 (L) 08/16/2020    ALBUMIN 1.7 (L) 08/15/2020    ALBUMIN 1.6 (L) 08/14/2020     Lab Results   Component Value Date    PREALBUMIN 21 07/15/2020       Estimated Creatinine Clearance: 53.1 mL/min (based on SCr of 0.9 mg/dL).    Accu-Checks  Recent Labs     08/14/20  2033 08/14/20  2331 08/15/20  0453 08/15/20  0851 08/15/20  1205 08/15/20  1648 08/15/20  2009 08/16/20  0004 08/16/20  0419 08/16/20  0745   POCTGLUCOSE 169* 80 178* 176* 158* 160* 159* 138* 196* 187*       Current Medications and/or Treatments Impacting Glycemic Control  Immunotherapy:    Immunosuppressants     None        Steroids:   Hormones (From admission, onward)    None        Pressors:    Autonomic Drugs (From admission, onward)    None        Hyperglycemia/Diabetes Medications:   Antihyperglycemics (From admission, onward)    Start     Stop Route Frequency Ordered    08/15/20 1200  insulin aspart U-100 pen 8 Units      -- SubQ Every 24 hours (non-standard times) 08/14/20 1308    08/15/20 0800  insulin aspart U-100 pen 8 Units      -- SubQ Every 24 hours (non-standard times) 08/14/20 1308    08/15/20 0400  insulin aspart U-100 pen 8 Units      -- SubQ Every 24 hours (non-standard times) 08/14/20 1308    08/15/20 0000  insulin aspart U-100 pen 8 Units      -- SubQ Every 24 hours (non-standard times) 08/14/20 1308    08/14/20 2000  insulin aspart U-100 pen 8 Units      -- SubQ Every 24 hours (non-standard times) 08/14/20 1308    08/14/20 1600  insulin aspart U-100 pen 8 Units      -- SubQ Every 24 hours (non-standard  times) 08/14/20 1308    08/14/20 1409  insulin aspart U-100 pen 0-5 Units      -- SubQ Every 4 hours PRN 08/14/20 1309          ASSESSMENT and PLAN    * Cutaneous fistula  Managed per primary team  Optimize BG control to improve wound healing        Type 2 diabetes mellitus without complication, with long-term current use of insulin  BG goal 140-180    - Novolog 8 units q 4 hrs while on TPN. HOLD if TPN is stopped for any reason.   - Low Dose Correction Scale PRN BG excursions.   - BG monitoring q 4 hrs while on TPN (coordinate with meals during the day)     ** Please call Endocrine for any BG related issues **  ** Please call Endocrine if TPN is restarted or diet changes **    Discharge plans:  TBD. Please notify endocrinology prior to discharge.       Chronic kidney disease, stage 3  Titrate insulin slowly  Avoid insulin stacking      Essential hypertension  Managed per primary team  Condition may cause insulin resistance             Va Genao NP  Endocrinology  [facility]

## 2020-08-16 NOTE — PROGRESS NOTES
[facility]  Colorectal Surgery  Progress Note    Patient Name: Azucena Morrison  MRN: [id number]  Admission Date: 7/15/2020  Hospital Length of Stay: 32 days  Attending Physician: Fabiana Valiente MD    Subjective:     Interval History:   GREGORY overnight. Pain improved with addition of PO medications. Tolerated small amount of PO, mainly boost. Denies fever, chills, CP, SOB, nausea, or vomiting. IR drain with minimal output.    Post-Op Info:  Procedure(s) (LRB):  RESECTION, COLON, LOW ANTERIOR (N/A)  CYSTOSCOPY (N/A)  CATHETERIZATION, URETER (Bilateral)  CREATION, ILEOSTOMY  LYSIS, ADHESIONS   16 Days Post-Op      Medications:  Continuous Infusions:   dextrose 10 % in water (D10W)      TPN ADULT CENTRAL LINE CUSTOM 75 mL/hr at 08/15/20 2135    TPN ADULT CENTRAL LINE CUSTOM       Scheduled Meds:   acetaminophen  1,000 mg Oral Q6H    carvediloL  12.5 mg Oral BID    enoxaparin  40 mg Subcutaneous Q24H    fat emulsion 20%  250 mL Intravenous Daily    gabapentin  300 mg Oral TID    hydroCHLOROthiazide  25 mg Oral Daily    ibuprofen  400 mg Oral TID    insulin aspart U-100  8 Units Subcutaneous Q24H    insulin aspart U-100  8 Units Subcutaneous Q24H    insulin aspart U-100  8 Units Subcutaneous Q24H    insulin aspart U-100  8 Units Subcutaneous Q24H    insulin aspart U-100  8 Units Subcutaneous Q24H    insulin aspart U-100  8 Units Subcutaneous Q24H    lidocaine  1 patch Transdermal Q24H    meropenem (MERREM) IVPB  2 g Intravenous Q8H    metoclopramide HCl  10 mg Oral TID AC    pantoprozole (PROTONIX) IV  40 mg Intravenous Q24H    scopolamine  1 patch Transdermal Q3 Days    sodium chloride 0.9%  10 mL Intravenous Q6H     PRN Meds:   sodium chloride    calcium carbonate    dextrose 10 % in water (D10W)    dextrose 50%    glucagon (human recombinant)    HYDROmorphone    HYDROmorphone    insulin aspart U-100    iohexol    naloxone    ondansetron    ondansetron    oxyCODONE     oxyCODONE    promethazine (PHENERGAN) IVPB    sodium chloride 0.9%    sodium chloride 0.9%    traMADoL        Objective:     Vital Signs (Most Recent):  Temp: 98.9 °F (37.2 °C) (08/16/20 0736)  Pulse: 85 (08/16/20 0736)  Resp: 18 (08/16/20 0915)  BP: (!) 164/76 (08/16/20 0736)  SpO2: 98 % (08/16/20 0736) Vital Signs (24h Range):  Temp:  [98.5 °F (36.9 °C)-98.9 °F (37.2 °C)] 98.9 °F (37.2 °C)  Pulse:  [74-92] 85  Resp:  [12-18] 18  SpO2:  [97 %-100 %] 98 %  BP: (136-164)/(69-79) 164/76     Intake/Output - Last 3 Shifts       08/14 0700 - 08/15 0659 08/15 0700 - 08/16 0659 08/16 0700 - 08/17 0659    P.O. 720 360     I.V. (mL/kg)       IV Piggyback 300 300     TPN 2038.8 2103.2 515.7    Total Intake(mL/kg) 3058.8 (43.3) 2763.2 (39.1) 515.7 (7.3)    Urine (mL/kg/hr) 2200 (1.3) 3000 (1.8)     Emesis/NG output       Drains 0 0 0    Other 0 0 0    Stool 750 850 175    Blood       Total Output 2950 3850 175    Net +108.8 -1086.8 +340.7           Stool Occurrence   0 x          Physical Exam  Constitutional:       General: She is not in acute distress.  HENT:      Head: Normocephalic.   Eyes:      Conjunctiva/sclera: Conjunctivae normal.   Neck:      Trachea: No tracheal deviation.   Cardiovascular:      Rate and Rhythm: Regular rhythm.   Pulmonary:      Effort: Pulmonary effort is normal. No respiratory distress.   Abdominal:      Comments:   Midline wound healing well, ostomy pink/viable with stool in bag, maintaining seal, L flank IR drain with minimal output  Neurological:      Mental Status: She is alert and oriented to person, place, and time.             Assessment/Plan:     * Cutaneous fistula  70 y.o. female 16 Days Post-Op for Procedure(s) (LRB):  RESECTION, COLON, LOW ANTERIOR (N/A)  CYSTOSCOPY (N/A)  CATHETERIZATION, URETER (Bilateral)  CREATION, ILEOSTOMY  LYSIS, ADHESIONS   IR drain placed 8/6: cultures growing Ecoli sensitive to Ertapenem (also has a UTI with the same Ecoli). Drain upsized  By IR on  8/12  Wound vac placed 8/10, removed 8/11  IV antibiotics switched to meropenem by ID and patient placed on contact precautions due to ESBL. Appreciate ID recommendations.    Continue diabetic diet as tolerated  Contact Ostomy/Wound care regarding ostomy bag leakage  Added scopolamine patch   Continue TPN   PENNY Cr normal  SCDs/DVT prophylaxis  GI prophylaxis  Encourage IS  PT/OT. Encourage to chair and ambulation.     Continue communication with use of       On total parenteral nutrition (TPN)  Reorder.    UTI (urinary tract infection)  Cultures growing E. Coli sensitive to ertapenem. First dose given 8/7. Infectious diseases consulted. Switched to meropenem on 8/13 by ID for ESBL E. Coli. Anticipated long course IV antibiotic treatment. Appreciate recommendations.     Acute blood loss anemia  IV ferric gluconate given 8/8. Hb stable yesterday at 7.3. Follow up morning labs.       Chronic kidney disease, stage 3  Trend BUN/Cr  Monitor UOP    Generalized abdominal pain  Continue standing tylenol, ibuprofen, gabapentin  PCA discontinued  PO oxycodone PRN for pain control    CT A/P today with PO and IV contrast to assess for undrained fluid collection  NPO at MN in case of need for IR procedure today, if no drainable collection noted on imaging, will continue diet      Essential hypertension  Home medications resumed  Can increase carvedilol as needed if BP still elevated    Type 2 diabetes mellitus without complication, with long-term current use of insulin  SSI  On scheduled insulin ordered by Endo team.  Accu check Q6    Endocrinology consulted. Appreciate their recommendations.           Bethany Castro MD  Colorectal Surgery  [facility]

## 2020-08-17 LAB
ALBUMIN SERPL BCP-MCNC: 1.7 G/DL (ref 3.5–5.2)
ALP SERPL-CCNC: 213 U/L (ref 55–135)
ALT SERPL W/O P-5'-P-CCNC: 18 U/L (ref 10–44)
ANION GAP SERPL CALC-SCNC: 10 MMOL/L (ref 8–16)
AST SERPL-CCNC: 22 U/L (ref 10–40)
BASOPHILS # BLD AUTO: 0.05 K/UL (ref 0–0.2)
BASOPHILS NFR BLD: 0.5 % (ref 0–1.9)
BILIRUB SERPL-MCNC: 0.2 MG/DL (ref 0.1–1)
BUN SERPL-MCNC: 20 MG/DL (ref 8–23)
CALCIUM SERPL-MCNC: 10 MG/DL (ref 8.7–10.5)
CHLORIDE SERPL-SCNC: 100 MMOL/L (ref 95–110)
CO2 SERPL-SCNC: 23 MMOL/L (ref 23–29)
CREAT SERPL-MCNC: 0.8 MG/DL (ref 0.5–1.4)
CRP SERPL-MCNC: 110.2 MG/L (ref 0–8.2)
DIFFERENTIAL METHOD: ABNORMAL
EOSINOPHIL # BLD AUTO: 0.6 K/UL (ref 0–0.5)
EOSINOPHIL NFR BLD: 5.5 % (ref 0–8)
ERYTHROCYTE [DISTWIDTH] IN BLOOD BY AUTOMATED COUNT: 17.2 % (ref 11.5–14.5)
EST. GFR  (AFRICAN AMERICAN): >60 ML/MIN/1.73 M^2
EST. GFR  (NON AFRICAN AMERICAN): >60 ML/MIN/1.73 M^2
GLUCOSE SERPL-MCNC: 119 MG/DL (ref 70–110)
GRAM STN SPEC: NORMAL
GRAM STN SPEC: NORMAL
HCT VFR BLD AUTO: 27.1 % (ref 37–48.5)
HGB BLD-MCNC: 8.1 G/DL (ref 12–16)
IMM GRANULOCYTES # BLD AUTO: 0.1 K/UL (ref 0–0.04)
IMM GRANULOCYTES NFR BLD AUTO: 0.9 % (ref 0–0.5)
LYMPHOCYTES # BLD AUTO: 1.6 K/UL (ref 1–4.8)
LYMPHOCYTES NFR BLD: 14.8 % (ref 18–48)
MAGNESIUM SERPL-MCNC: 2 MG/DL (ref 1.6–2.6)
MCH RBC QN AUTO: 24.8 PG (ref 27–31)
MCHC RBC AUTO-ENTMCNC: 29.9 G/DL (ref 32–36)
MCV RBC AUTO: 83 FL (ref 82–98)
MONOCYTES # BLD AUTO: 0.9 K/UL (ref 0.3–1)
MONOCYTES NFR BLD: 8.1 % (ref 4–15)
NEUTROPHILS # BLD AUTO: 7.7 K/UL (ref 1.8–7.7)
NEUTROPHILS NFR BLD: 70.2 % (ref 38–73)
NRBC BLD-RTO: 0 /100 WBC
PHOSPHATE SERPL-MCNC: 3.2 MG/DL (ref 2.7–4.5)
PLATELET # BLD AUTO: 850 K/UL (ref 150–350)
PMV BLD AUTO: 8.6 FL (ref 9.2–12.9)
POCT GLUCOSE: 142 MG/DL (ref 70–110)
POCT GLUCOSE: 171 MG/DL (ref 70–110)
POCT GLUCOSE: 173 MG/DL (ref 70–110)
POCT GLUCOSE: 184 MG/DL (ref 70–110)
POTASSIUM SERPL-SCNC: 4.4 MMOL/L (ref 3.5–5.1)
PROT SERPL-MCNC: 7.8 G/DL (ref 6–8.4)
RBC # BLD AUTO: 3.26 M/UL (ref 4–5.4)
SODIUM SERPL-SCNC: 133 MMOL/L (ref 136–145)
TRIGL SERPL-MCNC: 348 MG/DL (ref 30–150)
WBC # BLD AUTO: 10.91 K/UL (ref 3.9–12.7)

## 2020-08-17 PROCEDURE — 99232 SBSQ HOSP IP/OBS MODERATE 35: CPT | Mod: ,,, | Performed by: NURSE PRACTITIONER

## 2020-08-17 PROCEDURE — 25000003 PHARM REV CODE 250: Performed by: STUDENT IN AN ORGANIZED HEALTH CARE EDUCATION/TRAINING PROGRAM

## 2020-08-17 PROCEDURE — 36415 COLL VENOUS BLD VENIPUNCTURE: CPT

## 2020-08-17 PROCEDURE — 87102 FUNGUS ISOLATION CULTURE: CPT

## 2020-08-17 PROCEDURE — 87077 CULTURE AEROBIC IDENTIFY: CPT

## 2020-08-17 PROCEDURE — 99232 PR SUBSEQUENT HOSPITAL CARE,LEVL II: ICD-10-PCS | Mod: ,,, | Performed by: NURSE PRACTITIONER

## 2020-08-17 PROCEDURE — 20600001 HC STEP DOWN PRIVATE ROOM

## 2020-08-17 PROCEDURE — 83735 ASSAY OF MAGNESIUM: CPT

## 2020-08-17 PROCEDURE — 94761 N-INVAS EAR/PLS OXIMETRY MLT: CPT

## 2020-08-17 PROCEDURE — 63600175 PHARM REV CODE 636 W HCPCS: Performed by: STUDENT IN AN ORGANIZED HEALTH CARE EDUCATION/TRAINING PROGRAM

## 2020-08-17 PROCEDURE — 97607 NEG PRS WND THR NDME<=50SQCM: CPT

## 2020-08-17 PROCEDURE — 80053 COMPREHEN METABOLIC PANEL: CPT

## 2020-08-17 PROCEDURE — 25000003 PHARM REV CODE 250: Performed by: PHYSICIAN ASSISTANT

## 2020-08-17 PROCEDURE — A4217 STERILE WATER/SALINE, 500 ML: HCPCS | Performed by: STUDENT IN AN ORGANIZED HEALTH CARE EDUCATION/TRAINING PROGRAM

## 2020-08-17 PROCEDURE — B4185 PARENTERAL SOL 10 GM LIPIDS: HCPCS | Performed by: STUDENT IN AN ORGANIZED HEALTH CARE EDUCATION/TRAINING PROGRAM

## 2020-08-17 PROCEDURE — 84100 ASSAY OF PHOSPHORUS: CPT

## 2020-08-17 PROCEDURE — 97535 SELF CARE MNGMENT TRAINING: CPT

## 2020-08-17 PROCEDURE — 63600175 PHARM REV CODE 636 W HCPCS: Performed by: PHYSICIAN ASSISTANT

## 2020-08-17 PROCEDURE — C9113 INJ PANTOPRAZOLE SODIUM, VIA: HCPCS | Performed by: STUDENT IN AN ORGANIZED HEALTH CARE EDUCATION/TRAINING PROGRAM

## 2020-08-17 PROCEDURE — 87070 CULTURE OTHR SPECIMN AEROBIC: CPT

## 2020-08-17 PROCEDURE — 85025 COMPLETE CBC W/AUTO DIFF WBC: CPT

## 2020-08-17 PROCEDURE — 87186 SC STD MICRODIL/AGAR DIL: CPT

## 2020-08-17 PROCEDURE — 84478 ASSAY OF TRIGLYCERIDES: CPT

## 2020-08-17 PROCEDURE — 86140 C-REACTIVE PROTEIN: CPT

## 2020-08-17 PROCEDURE — 97530 THERAPEUTIC ACTIVITIES: CPT

## 2020-08-17 PROCEDURE — 25500020 PHARM REV CODE 255: Performed by: COLON & RECTAL SURGERY

## 2020-08-17 PROCEDURE — 87205 SMEAR GRAM STAIN: CPT

## 2020-08-17 PROCEDURE — 94799 UNLISTED PULMONARY SVC/PX: CPT

## 2020-08-17 PROCEDURE — 87075 CULTR BACTERIA EXCEPT BLOOD: CPT

## 2020-08-17 PROCEDURE — 63600175 PHARM REV CODE 636 W HCPCS: Performed by: RADIOLOGY

## 2020-08-17 PROCEDURE — A4216 STERILE WATER/SALINE, 10 ML: HCPCS | Performed by: STUDENT IN AN ORGANIZED HEALTH CARE EDUCATION/TRAINING PROGRAM

## 2020-08-17 RX ORDER — FENTANYL CITRATE 50 UG/ML
INJECTION, SOLUTION INTRAMUSCULAR; INTRAVENOUS CODE/TRAUMA/SEDATION MEDICATION
Status: COMPLETED | OUTPATIENT
Start: 2020-08-17 | End: 2020-08-17

## 2020-08-17 RX ADMIN — PANTOPRAZOLE SODIUM 40 MG: 40 INJECTION, POWDER, LYOPHILIZED, FOR SOLUTION INTRAVENOUS at 10:08

## 2020-08-17 RX ADMIN — LIDOCAINE 1 PATCH: 50 PATCH TOPICAL at 07:08

## 2020-08-17 RX ADMIN — GABAPENTIN 300 MG: 300 CAPSULE ORAL at 09:08

## 2020-08-17 RX ADMIN — CARVEDILOL 12.5 MG: 12.5 TABLET, FILM COATED ORAL at 10:08

## 2020-08-17 RX ADMIN — HYDROMORPHONE HYDROCHLORIDE 1 MG: 1 INJECTION, SOLUTION INTRAMUSCULAR; INTRAVENOUS; SUBCUTANEOUS at 07:08

## 2020-08-17 RX ADMIN — METOCLOPRAMIDE 10 MG: 5 TABLET ORAL at 05:08

## 2020-08-17 RX ADMIN — METOCLOPRAMIDE 10 MG: 5 TABLET ORAL at 12:08

## 2020-08-17 RX ADMIN — INSULIN ASPART 8 UNITS: 100 INJECTION, SOLUTION INTRAVENOUS; SUBCUTANEOUS at 12:08

## 2020-08-17 RX ADMIN — INSULIN ASPART 8 UNITS: 100 INJECTION, SOLUTION INTRAVENOUS; SUBCUTANEOUS at 08:08

## 2020-08-17 RX ADMIN — HYDROMORPHONE HYDROCHLORIDE 1 MG: 1 INJECTION, SOLUTION INTRAMUSCULAR; INTRAVENOUS; SUBCUTANEOUS at 03:08

## 2020-08-17 RX ADMIN — Medication 10 ML: at 05:08

## 2020-08-17 RX ADMIN — HYDROCHLOROTHIAZIDE 25 MG: 25 TABLET ORAL at 10:08

## 2020-08-17 RX ADMIN — IBUPROFEN 400 MG: 400 TABLET, FILM COATED ORAL at 03:08

## 2020-08-17 RX ADMIN — Medication 10 ML: at 06:08

## 2020-08-17 RX ADMIN — Medication 10 ML: at 12:08

## 2020-08-17 RX ADMIN — GABAPENTIN 300 MG: 300 CAPSULE ORAL at 10:08

## 2020-08-17 RX ADMIN — INSULIN ASPART 8 UNITS: 100 INJECTION, SOLUTION INTRAVENOUS; SUBCUTANEOUS at 04:08

## 2020-08-17 RX ADMIN — CARVEDILOL 12.5 MG: 12.5 TABLET, FILM COATED ORAL at 09:08

## 2020-08-17 RX ADMIN — MAGNESIUM SULFATE HEPTAHYDRATE: 500 INJECTION, SOLUTION INTRAMUSCULAR; INTRAVENOUS at 09:08

## 2020-08-17 RX ADMIN — HYDROMORPHONE HYDROCHLORIDE 1 MG: 1 INJECTION, SOLUTION INTRAMUSCULAR; INTRAVENOUS; SUBCUTANEOUS at 05:08

## 2020-08-17 RX ADMIN — ENOXAPARIN SODIUM 40 MG: 40 INJECTION SUBCUTANEOUS at 07:08

## 2020-08-17 RX ADMIN — FENTANYL CITRATE 50 MCG: 50 INJECTION, SOLUTION INTRAMUSCULAR; INTRAVENOUS at 05:08

## 2020-08-17 RX ADMIN — MEROPENEM 2 G: 1 INJECTION, POWDER, FOR SOLUTION INTRAVENOUS at 09:08

## 2020-08-17 RX ADMIN — IBUPROFEN 400 MG: 400 TABLET, FILM COATED ORAL at 09:08

## 2020-08-17 RX ADMIN — IBUPROFEN 400 MG: 400 TABLET, FILM COATED ORAL at 10:08

## 2020-08-17 RX ADMIN — I.V. FAT EMULSION 250 ML: 20 EMULSION INTRAVENOUS at 09:08

## 2020-08-17 RX ADMIN — METOCLOPRAMIDE 10 MG: 5 TABLET ORAL at 03:08

## 2020-08-17 RX ADMIN — HYDROMORPHONE HYDROCHLORIDE 0.5 MG: 1 INJECTION, SOLUTION INTRAMUSCULAR; INTRAVENOUS; SUBCUTANEOUS at 12:08

## 2020-08-17 RX ADMIN — ACETAMINOPHEN 1000 MG: 500 TABLET ORAL at 05:08

## 2020-08-17 RX ADMIN — GABAPENTIN 300 MG: 300 CAPSULE ORAL at 03:08

## 2020-08-17 RX ADMIN — MEROPENEM 2 G: 1 INJECTION, POWDER, FOR SOLUTION INTRAVENOUS at 04:08

## 2020-08-17 RX ADMIN — MEROPENEM 2 G: 1 INJECTION, POWDER, FOR SOLUTION INTRAVENOUS at 12:08

## 2020-08-17 RX ADMIN — HYDROMORPHONE HYDROCHLORIDE 1 MG: 1 INJECTION, SOLUTION INTRAMUSCULAR; INTRAVENOUS; SUBCUTANEOUS at 11:08

## 2020-08-17 RX ADMIN — ACETAMINOPHEN 1000 MG: 500 TABLET ORAL at 07:08

## 2020-08-17 RX ADMIN — ONDANSETRON 4 MG: 2 INJECTION INTRAMUSCULAR; INTRAVENOUS at 12:08

## 2020-08-17 RX ADMIN — ACETAMINOPHEN 1000 MG: 500 TABLET ORAL at 12:08

## 2020-08-17 RX ADMIN — FENTANYL CITRATE 50 MCG: 50 INJECTION, SOLUTION INTRAMUSCULAR; INTRAVENOUS at 06:08

## 2020-08-17 RX ADMIN — HYDROMORPHONE HYDROCHLORIDE 1 MG: 1 INJECTION, SOLUTION INTRAMUSCULAR; INTRAVENOUS; SUBCUTANEOUS at 10:08

## 2020-08-17 NOTE — SUBJECTIVE & OBJECTIVE
Subjective:     Interval History: Wound vac removed overnight, replaced with ABD pads. Patient doing well this AM, states that she has nausea but that it is not bad. States that she has been up and ambulating well. Otherwise denies v/f/c.    Post-Op Info:  Procedure(s) (LRB):  RESECTION, COLON, LOW ANTERIOR (N/A)  CYSTOSCOPY (N/A)  CATHETERIZATION, URETER (Bilateral)  CREATION, ILEOSTOMY  LYSIS, ADHESIONS   17 Days Post-Op      Medications:  Continuous Infusions:   dextrose 10 % in water (D10W)      TPN ADULT CENTRAL LINE CUSTOM 75 mL/hr at 08/16/20 2141     Scheduled Meds:   acetaminophen  1,000 mg Oral Q6H    carvediloL  12.5 mg Oral BID    enoxaparin  40 mg Subcutaneous Q24H    fat emulsion 20%  250 mL Intravenous Daily    gabapentin  300 mg Oral TID    hydroCHLOROthiazide  25 mg Oral Daily    ibuprofen  400 mg Oral TID    insulin aspart U-100  8 Units Subcutaneous Q24H    insulin aspart U-100  8 Units Subcutaneous Q24H    insulin aspart U-100  8 Units Subcutaneous Q24H    insulin aspart U-100  8 Units Subcutaneous Q24H    insulin aspart U-100  8 Units Subcutaneous Q24H    insulin aspart U-100  8 Units Subcutaneous Q24H    lidocaine  1 patch Transdermal Q24H    meropenem (MERREM) IVPB  2 g Intravenous Q8H    metoclopramide HCl  10 mg Oral TID AC    pantoprozole (PROTONIX) IV  40 mg Intravenous Q24H    scopolamine  1 patch Transdermal Q3 Days    sodium chloride 0.9%  10 mL Intravenous Q6H     PRN Meds:   sodium chloride    calcium carbonate    dextrose 10 % in water (D10W)    dextrose 50%    glucagon (human recombinant)    HYDROmorphone    HYDROmorphone    insulin aspart U-100    iohexol    naloxone    ondansetron    ondansetron    oxyCODONE    oxyCODONE    promethazine (PHENERGAN) IVPB    sodium chloride 0.9%    sodium chloride 0.9%    traMADoL        Objective:     Vital Signs (Most Recent):  Temp: 98.9 °F (37.2 °C) (08/17/20 0022)  Pulse: 86 (08/17/20 0528)  Resp: 16  (08/17/20 0528)  BP: (!) 146/71 (08/17/20 0528)  SpO2: (!) 91 % (08/17/20 0528) Vital Signs (24h Range):  Temp:  [98.4 °F (36.9 °C)-99 °F (37.2 °C)] 98.9 °F (37.2 °C)  Pulse:  [84-94] 86  Resp:  [16-20] 16  SpO2:  [91 %-98 %] 91 %  BP: (126-164)/(63-76) 146/71     Intake/Output - Last 3 Shifts       08/15 0700 - 08/16 0659 08/16 0700 - 08/17 0659    P.O. 360 1830    I.V. (mL/kg)  20 (0.3)    IV Piggyback 300 300    TPN 2103.2 1288.1    Total Intake(mL/kg) 2763.2 (39.1) 3438.1 (48.6)    Urine (mL/kg/hr) 3000 (1.8) 2650 (1.6)    Drains 0 0    Other 0 0    Stool 850 1525    Total Output 3850 4175    Net -1086.8 -736.9          Stool Occurrence  0 x          Physical Exam  Constitutional:       General: She is not in acute distress.     Appearance: Normal appearance.   HENT:      Head: Normocephalic and atraumatic.      Right Ear: External ear normal.      Left Ear: External ear normal.      Nose: Nose normal.      Mouth/Throat:      Mouth: Mucous membranes are moist.   Eyes:      Pupils: Pupils are equal, round, and reactive to light.   Neck:      Musculoskeletal: Normal range of motion.   Cardiovascular:      Rate and Rhythm: Normal rate.   Pulmonary:      Effort: Pulmonary effort is normal. No respiratory distress.      Comments: JOSEPH, no supplemental oxygen req  Abdominal:      General: There is no distension.      Palpations: Abdomen is soft.      Comments: Ostomy pink and patent, green bowel contents and gas in bag. Midline incision packed with gauze, no toby purulence.    Musculoskeletal: Normal range of motion.   Skin:     General: Skin is warm and dry.   Neurological:      General: No focal deficit present.      Mental Status: She is alert and oriented to person, place, and time.   Psychiatric:         Mood and Affect: Mood normal.         Behavior: Behavior normal.         Significant Labs:  CBC (Last 3 Results):   Recent Labs   Lab 08/14/20  0438 08/15/20  1314 08/16/20  0621   WBC 9.29 9.84 11.77   RBC  2.84* 3.09* 3.13*   HGB 7.3* 7.7* 8.0*   HCT 23.5* 25.7* 25.6*   * 891* 854*   MCV 83 83 82   MCH 25.7* 24.9* 25.6*   MCHC 31.1* 30.0* 31.3*     CMP (Last 3 Results):   Recent Labs   Lab 08/14/20  0438 08/15/20  1314 08/16/20  0621   * 100 198*   CALCIUM 9.0 9.3 9.4   ALBUMIN 1.6* 1.7* 1.7*   PROT 7.4 7.8 8.4    136 135*   K 4.4 4.2 4.4   CO2 25 26 23    100 101   BUN 16 15 17   CREATININE 0.9 0.9 0.9   ALKPHOS 256* 286* 245*   ALT 17 25 20   AST 17 33 20   BILITOT 0.3 0.3 0.2     CRP (Last 3 Results):   Recent Labs   Lab 08/13/20  0519 08/14/20  0438 08/16/20  0621   .8* 202.3* 144.5*       Significant Diagnostics:  CT: I have reviewed all pertinent results/findings within the past 24 hours:  Interval increase in size in anterior midline abdominal fluid collection, interval increase in size in right anterior abdominal fluid collection. IR drain w/ minimal output.

## 2020-08-17 NOTE — PROGRESS NOTES
[facility]  Colorectal Surgery  Progress Note    Patient Name: Azucena Morrison  MRN: [id number]  Admission Date: 7/15/2020  Hospital Length of Stay: 33 days  Attending Physician: Fabiana Valiente MD    Subjective:     Interval History: Wound vac removed overnight, replaced with ABD pads. Patient doing well this AM, states that she has nausea but that it is not bad. States that she has been up and ambulating well. Otherwise denies v/f/c.    Post-Op Info:  Procedure(s) (LRB):  RESECTION, COLON, LOW ANTERIOR (N/A)  CYSTOSCOPY (N/A)  CATHETERIZATION, URETER (Bilateral)  CREATION, ILEOSTOMY  LYSIS, ADHESIONS   17 Days Post-Op      Medications:  Continuous Infusions:   dextrose 10 % in water (D10W)      TPN ADULT CENTRAL LINE CUSTOM 75 mL/hr at 08/16/20 2141     Scheduled Meds:   acetaminophen  1,000 mg Oral Q6H    carvediloL  12.5 mg Oral BID    enoxaparin  40 mg Subcutaneous Q24H    fat emulsion 20%  250 mL Intravenous Daily    gabapentin  300 mg Oral TID    hydroCHLOROthiazide  25 mg Oral Daily    ibuprofen  400 mg Oral TID    insulin aspart U-100  8 Units Subcutaneous Q24H    insulin aspart U-100  8 Units Subcutaneous Q24H    insulin aspart U-100  8 Units Subcutaneous Q24H    insulin aspart U-100  8 Units Subcutaneous Q24H    insulin aspart U-100  8 Units Subcutaneous Q24H    insulin aspart U-100  8 Units Subcutaneous Q24H    lidocaine  1 patch Transdermal Q24H    meropenem (MERREM) IVPB  2 g Intravenous Q8H    metoclopramide HCl  10 mg Oral TID AC    pantoprozole (PROTONIX) IV  40 mg Intravenous Q24H    scopolamine  1 patch Transdermal Q3 Days    sodium chloride 0.9%  10 mL Intravenous Q6H     PRN Meds:   sodium chloride    calcium carbonate    dextrose 10 % in water (D10W)    dextrose 50%    glucagon (human recombinant)    HYDROmorphone    HYDROmorphone    insulin aspart U-100    iohexol    naloxone    ondansetron    ondansetron    oxyCODONE    oxyCODONE     promethazine (PHENERGAN) IVPB    sodium chloride 0.9%    sodium chloride 0.9%    traMADoL        Objective:     Vital Signs (Most Recent):  Temp: 98.9 °F (37.2 °C) (08/17/20 0022)  Pulse: 86 (08/17/20 0528)  Resp: 16 (08/17/20 0528)  BP: (!) 146/71 (08/17/20 0528)  SpO2: (!) 91 % (08/17/20 0528) Vital Signs (24h Range):  Temp:  [98.4 °F (36.9 °C)-99 °F (37.2 °C)] 98.9 °F (37.2 °C)  Pulse:  [84-94] 86  Resp:  [16-20] 16  SpO2:  [91 %-98 %] 91 %  BP: (126-164)/(63-76) 146/71     Intake/Output - Last 3 Shifts       08/15 0700 - 08/16 0659 08/16 0700 - 08/17 0659    P.O. 360 1830    I.V. (mL/kg)  20 (0.3)    IV Piggyback 300 300    TPN 2103.2 1288.1    Total Intake(mL/kg) 2763.2 (39.1) 3438.1 (48.6)    Urine (mL/kg/hr) 3000 (1.8) 2650 (1.6)    Drains 0 0    Other 0 0    Stool 850 1525    Total Output 3850 4175    Net -1086.8 -736.9          Stool Occurrence  0 x          Physical Exam  Constitutional:       General: She is not in acute distress.     Appearance: Normal appearance.   HENT:      Head: Normocephalic and atraumatic.      Right Ear: External ear normal.      Left Ear: External ear normal.      Nose: Nose normal.      Mouth/Throat:      Mouth: Mucous membranes are moist.   Eyes:      Pupils: Pupils are equal, round, and reactive to light.   Neck:      Musculoskeletal: Normal range of motion.   Cardiovascular:      Rate and Rhythm: Normal rate.   Pulmonary:      Effort: Pulmonary effort is normal. No respiratory distress.      Comments: JOSEPH, no supplemental oxygen req  Abdominal:      General: There is no distension.      Palpations: Abdomen is soft.      Comments: Ostomy pink and patent, green bowel contents and gas in bag. Midline incision packed with gauze, no toby purulence.    Musculoskeletal: Normal range of motion.   Skin:     General: Skin is warm and dry.   Neurological:      General: No focal deficit present.      Mental Status: She is alert and oriented to person, place, and time.   Psychiatric:          Mood and Affect: Mood normal.         Behavior: Behavior normal.         Significant Labs:  CBC (Last 3 Results):   Recent Labs   Lab 08/14/20  0438 08/15/20  1314 08/16/20  0621   WBC 9.29 9.84 11.77   RBC 2.84* 3.09* 3.13*   HGB 7.3* 7.7* 8.0*   HCT 23.5* 25.7* 25.6*   * 891* 854*   MCV 83 83 82   MCH 25.7* 24.9* 25.6*   MCHC 31.1* 30.0* 31.3*     CMP (Last 3 Results):   Recent Labs   Lab 08/14/20  0438 08/15/20  1314 08/16/20  0621   * 100 198*   CALCIUM 9.0 9.3 9.4   ALBUMIN 1.6* 1.7* 1.7*   PROT 7.4 7.8 8.4    136 135*   K 4.4 4.2 4.4   CO2 25 26 23    100 101   BUN 16 15 17   CREATININE 0.9 0.9 0.9   ALKPHOS 256* 286* 245*   ALT 17 25 20   AST 17 33 20   BILITOT 0.3 0.3 0.2     CRP (Last 3 Results):   Recent Labs   Lab 08/13/20  0519 08/14/20  0438 08/16/20  0621   .8* 202.3* 144.5*       Significant Diagnostics:  CT: I have reviewed all pertinent results/findings within the past 24 hours:  Interval increase in size in anterior midline abdominal fluid collection, interval increase in size in right anterior abdominal fluid collection. IR drain w/ minimal output.    Assessment/Plan:     * Cutaneous fistula  70 y.o. female 17 Days Post-Op for Procedure(s) (LRB):  RESECTION, COLON, LOW ANTERIOR (N/A)  CYSTOSCOPY (N/A)  CATHETERIZATION, URETER (Bilateral)  CREATION, ILEOSTOMY  LYSIS, ADHESIONS   IR drain placed 8/6: cultures growing Ecoli sensitive to Ertapenem (also has a UTI with the same Ecoli). Drain upsized  By IR on 8/12  Wound vac placed 8/10, removed 8/11  IV antibiotics switched to meropenem by ID and patient placed on contact precautions due to ESBL. Appreciate ID recommendations.    Continue diabetic diet as tolerated, NPO this am for IR drain placement  Added scopolamine patch   Continue TPN   SCDs/DVT prophylaxis  GI prophylaxis  Encourage IS  PT/OT. Encourage to chair and ambulation.  IR today for drain placement for interval increased size in fluid  collections    Continue communication with use of       On total parenteral nutrition (TPN)  Reorder.    UTI (urinary tract infection)  Cultures growing E. Coli sensitive to ertapenem. First dose given 8/7. Infectious diseases consulted. Switched to meropenem on 8/13 by ID for ESBL E. Coli. Anticipated long course IV antibiotic treatment. Appreciate recommendations.     Acute blood loss anemia  H/H has remained stable, f/u morning labs      Chronic kidney disease, stage 3  Trend BUN/Cr  Monitor UOP    Generalized abdominal pain  Continue standing tylenol, ibuprofen, gabapentin  PCA discontinued  PO oxycodone PRN for pain control    CT w/ interval increase in size in both midline and right sided anterior abdominal fluid collections, patient remaining NPO for IR drain placement today  IR consulted    Essential hypertension  Home medications resumed  Can increase carvedilol as needed if BP still elevated    Type 2 diabetes mellitus without complication, with long-term current use of insulin  SSI  On scheduled insulin ordered by Endo team.  Accu check Q6    Endocrinology consulted. Appreciate their recommendations.         Alton Patino MD  Colorectal Surgery  [facility]

## 2020-08-17 NOTE — H&P
Vascular and Interventional Radiology History & Physical    Date:  2020    Chief Complaint:   Multiple intraabdominal fluid collections    History of Present Illness:  Azucena Morrison is a 70 y.o. female w/ hx intraabdominal surgery complicated by multiple fluid collections. Pt presents for IR drainage of fluid collections.     Past Medical History:  Past Medical History:   Diagnosis Date    Chronic kidney disease, stage 3     Diabetes mellitus     Diabetes mellitus, type 2     Hypertension     UTI (urinary tract infection) 2020       Past Surgical History:  Past Surgical History:   Procedure Laterality Date    CATHETERIZATION OF URETER Bilateral 2020    Procedure: CATHETERIZATION, URETER;  Surgeon: Kvng Cunningham MD;  Location: [facility] OR [address] [place] [address];  Service: Urology;  Laterality: Bilateral;     SECTION, CLASSIC      x2    CHOLECYSTECTOMY      COLON SURGERY      [facility]    COLONOSCOPY N/A 2018    Procedure: COLONOSCOPY;  Surgeon: Gibran Aguayo MD;  Location: [facility];  Service: Endoscopy;  Laterality: N/A;    COLOSTOMY Left     CYSTOSCOPY N/A 2020    Procedure: CYSTOSCOPY;  Surgeon: Kvng Cunningham MD;  Location: [address];  Service: Urology;  Laterality: N/A;    CYSTOSCOPY WITH URETEROSCOPY, RETROGRADE PYELOGRAPHY, AND INSERTION OF STENT Left 2019    Procedure: CYSTOSCOPY, WITH RETROGRADE PYELOGRAM AND URETERAL STENT INSERTION with placement of a muir cath;  Surgeon: Ulisses Horton MD;  Location: [facility];  Service: General;  Laterality: Left;    ILEOSTOMY  2020    Procedure: CREATION, ILEOSTOMY;  Surgeon: Fabiana Valiente MD;  [address];  Service: Colon and Rectal;;    INCISION AND DRAINAGE OF ABSCESS N/A 2019    Procedure: INCISION AND DRAINAGE, ABSCESS;  Surgeon: Ulisses Horton MD;  Location: [facility] OR;  Service: General;  Laterality: N/A;    INCISION OF ABDOMINAL WALL N/A 8/10/2018     Procedure: INCISION, ABDOMINAL WALL;  Surgeon: Ulisses Horton MD;  Location: [facility];  Service: General;  Laterality: N/A;    INCISIONAL HERNIA REPAIR Right 2010    LOW ANTERIOR RESECTION OF COLON N/A 2020    Procedure: RESECTION, COLON, LOW ANTERIOR;  Surgeon: Fabiana Valiente MD;  Location: [facility] OR 2ND FLR;  Service: Colon and Rectal;  Laterality: N/A;    LYSIS OF ADHESIONS  2020    Procedure: LYSIS, ADHESIONS;  Surgeon: Fabiana Valiente MD;  Location: [facility] OR 2ND FLR;  Service: Colon and Rectal;;        Sedation History:    Denies any adverse reactions.  Denies problems laying flat.    Social History:  Social History     Tobacco Use    Smoking status: Former Smoker     Types: Cigarettes     Quit date: 2000     Years since quittin.6    Smokeless tobacco: Never Used   Substance Use Topics    Alcohol use: No    Drug use: No        Home Medications:   Prior to Admission medications    Medication Sig Start Date End Date Taking? Authorizing Provider   carvedilol (COREG) 25 MG tablet Take 1 tablet (25 mg total) by mouth once daily. 19  Yes    acetaminophen (TYLENOL) 325 MG tablet Take 2 tablets (650 mg total) by mouth every 8 (eight) hours as needed. 16   Ulisses Horton MD   betamethasone valerate 0.1% (VALISONE) 0.1 % Crea Apply around wound as needed daily for itching. 4/10/19   Ulisses Horton MD   blood sugar diagnostic Strp Use to test blood sugar twice daily as directed. 20   Ulisses Horton MD   blood-glucose meter Misc Use as directed to test blood sugar twice daily 20   Ulisses Horton MD   clotrimazole-betamethasone 1-0.05% (LOTRISONE) cream Apply topically locally as directed 20   Ulisses Horton MD   dicyclomine (BENTYL) 10 MG capsule TAKE 1 CAPSULE BY MOUTH THREE TIMES A DAY 20      gentamicin (GARAMYCIN) 0.1 % ointment Apply topically to affected area daily 20   Ulisses Horton MD   hydroCHLOROthiazide  "(HYDRODIURIL) 25 MG tablet Take 1 tablet (25 mg total) by mouth once daily. 8/30/19   Pj Sanches MD   HYDROcodone-acetaminophen (NORCO) 5-325 mg per tablet Take 1 tablet by mouth every 4 (four) hours as needed. 7/3/20   Ulisses Horton MD   insulin glargine 100 units/mL (3mL) SubQ pen Inject subcutaneously into the skin 10 units every morning. 1/9/19      lancets 30 gauge Misc Use to test blood sugar twice daily. 4/22/20   Ulisses Horton MD   metFORMIN (GLUCOPHAGE) 1000 MG tablet Take 1 tablet (1,000 mg total) by mouth 2 (two) times daily. 1/9/19      metoclopramide HCl (REGLAN) 10 MG tablet Take 1 tablet (10 mg total) by mouth 4 (four) times daily. 7/8/20   Ulisses Horton MD   naproxen (NAPROSYN) 500 MG tablet Take 1 tablet (500 mg total) by mouth 2 (two) times daily with meals. 9/4/19   Ulisses Horton MD   ondansetron (ZOFRAN) 8 MG tablet Take 1 tablet (8 mg total) by mouth 2 (two) times daily as needed.  Patient taking differently: Take 8 mg by mouth every 8 (eight) hours as needed for Nausea.  4/22/20   Ulisses Horton MD   pen needle, diabetic (BD ULTRA-FINE ONEIL PEN NEEDLE) 32 gauge x 5/32" Ndle Use to inject insulin daily 7/24/19   Ulisses Horton MD   TRUETEST TEST STRIPS Strp  4/7/16   Historical Provider, MD       Inpatient Medications:    Current Facility-Administered Medications:     0.9%  NaCl infusion (for blood administration), , Intravenous, Q24H PRN, Judy Turner MD    acetaminophen tablet 1,000 mg, 1,000 mg, Oral, Q6H, Bethany Castro MD, 1,000 mg at 08/17/20 0520    calcium carbonate 200 mg calcium (500 mg) chewable tablet 500 mg, 500 mg, Oral, BID PRN, Judy Turner MD    carvediloL tablet 12.5 mg, 12.5 mg, Oral, BID, Judy Turner MD, 12.5 mg at 08/16/20 2134    dextrose 10 % infusion, , Intravenous, Continuous PRN, Va Genao, NP    dextrose 50% injection 12.5 g, 12.5 g, Intravenous, PRN, Va Genao, NP    " enoxaparin injection 40 mg, 40 mg, Subcutaneous, Q24H, Shyam Aguila MD, 40 mg at 08/16/20 1727    fat emulsion 20% infusion 250 mL, 250 mL, Intravenous, Daily, Ramonita Osorio MD, Last Rate: 20.8 mL/hr at 08/16/20 2141, 250 mL at 08/16/20 2141    fat emulsion 20% infusion 250 mL, 250 mL, Intravenous, Daily, Alton Patino MD    gabapentin capsule 300 mg, 300 mg, Oral, TID, Bethany Castro MD, 300 mg at 08/16/20 2134    glucagon (human recombinant) injection 1 mg, 1 mg, Intramuscular, PRN, Va Genao NP    hydroCHLOROthiazide tablet 25 mg, 25 mg, Oral, Daily, Judy Turner MD, 25 mg at 08/16/20 0925    HYDROmorphone injection 0.5 mg, 0.5 mg, Intravenous, Q4H PRN, Judy Turner MD, 0.5 mg at 08/17/20 0023    HYDROmorphone injection 1 mg, 1 mg, Intravenous, Q4H PRN, Judy Turner MD, 1 mg at 08/17/20 0521    ibuprofen tablet 400 mg, 400 mg, Oral, TID, Judy Turner MD, 400 mg at 08/16/20 2134    insulin aspart U-100 pen 0-5 Units, 0-5 Units, Subcutaneous, Q4H PRN, Va Genao NP    insulin aspart U-100 pen 8 Units, 8 Units, Subcutaneous, Q24H, Va Genao NP, 8 Units at 08/17/20 0022    insulin aspart U-100 pen 8 Units, 8 Units, Subcutaneous, Q24H, Va Genao NP, 8 Units at 08/17/20 0424    insulin aspart U-100 pen 8 Units, 8 Units, Subcutaneous, Q24H, Va Genao NP, 8 Units at 08/16/20 0746    insulin aspart U-100 pen 8 Units, 8 Units, Subcutaneous, Q24H, Va Genao NP, 8 Units at 08/16/20 1202    insulin aspart U-100 pen 8 Units, 8 Units, Subcutaneous, Q24H, Va Genao NP, 8 Units at 08/16/20 1736    insulin aspart U-100 pen 8 Units, 8 Units, Subcutaneous, Q24H, Va Genao NP, 8 Units at 08/16/20 2005    iohexol (OMNIPAQUE) oral solution 15 mL, 15 mL, Oral, PRN, Avi Keenan DO, 15 mL at 08/11/20 1222    lidocaine 5 % patch 1 patch, 1 patch, Transdermal, Q24H, Judy Turner MD, 1 patch at 08/16/20  1726    meropenem (MERREM) 2 g in sodium chloride 0.9% 100 mL IVPB, 2 g, Intravenous, Q8H, Debbie Cadena PA-C, Last Rate: 100 mL/hr at 08/17/20 0030, 2 g at 08/17/20 0030    metoclopramide HCl tablet 10 mg, 10 mg, Oral, TID AC, Judy Turner MD, 10 mg at 08/17/20 0520    naloxone 0.4 mg/mL injection 0.02 mg, 0.02 mg, Intravenous, PRN, Judy Turner MD    ondansetron injection 4 mg, 4 mg, Intravenous, Q6H PRN, Shyam Aguila MD, 4 mg at 08/17/20 0023    ondansetron injection 8 mg, 8 mg, Intravenous, Q6H PRN, Shyam Aguila MD, 8 mg at 08/13/20 1629    oxyCODONE immediate release tablet 5 mg, 5 mg, Oral, Q4H PRN, Bethany Castro MD    oxyCODONE immediate release tablet Tab 10 mg, 10 mg, Oral, Q4H PRN, Bethany Castro MD, 10 mg at 08/16/20 1726    pantoprazole injection 40 mg, 40 mg, Intravenous, Q24H, Judy Turner MD, 40 mg at 08/16/20 0925    promethazine (PHENERGAN) 6.25 mg in dextrose 5 % 50 mL IVPB, 6.25 mg, Intravenous, Q6H PRN, Shyam Aguila MD, Last Rate: 150 mL/hr at 08/13/20 2051, 6.25 mg at 08/13/20 2051    scopolamine 1.3-1.5 mg (1 mg over 3 days) 1 patch, 1 patch, Transdermal, Q3 Days, Ramonita Osorio MD, 1 patch at 08/16/20 0927    Flushing [facility] Protocol, , , Until Discontinued **AND** sodium chloride 0.9% flush 10 mL, 10 mL, Intravenous, Q6H, 10 mL at 08/17/20 0521 **AND** sodium chloride 0.9% flush 10 mL, 10 mL, Intravenous, PRN, Shyam Aguila MD, 10 mL at 08/12/20 1254    sodium chloride 0.9% flush 10 mL, 10 mL, Intra-Catheter, PRN, Shyam Aguila MD    TPN ADULT CENTRAL LINE CUSTOM, , Intravenous, Continuous, Ramonita Osorio MD, Last Rate: 75 mL/hr at 08/16/20 2141    TPN ADULT CENTRAL LINE CUSTOM, , Intravenous, Continuous, Alton Patino MD    traMADoL tablet 50 mg, 50 mg, Oral, Q6H PRN, Shyam Aguila MD, 50 mg at 08/12/20 2201     Anticoagulants/Antiplatelets:   no anticoagulation    Allergies:   Review of patient's allergies indicates:    Allergen Reactions    Chlorhexidine gluconate Rash     Chlorhexidine/alcohol wipes. Rash and blistering.       Review of Systems:   As documented in primary provider H&P.    Vital Signs (Most Recent):  Temp: 98.7 °F (37.1 °C) (08/17/20 0847)  Pulse: 78 (08/17/20 0847)  Resp: 18 (08/17/20 0847)  BP: (!) 146/78 (08/17/20 0847)  SpO2: 96 % (08/17/20 0847)    Physical Exam:  No acute distress, laying comfortably in bed, pleasant and cooperative  Regular rate and rhythm  Breathing unlabored  Abdomen benign  Extremities warm and well perfused    Sedation Exam:  ASA: II - Patient appears to have mild systemic disease, adequately controlled   Mallampati: II (hard and soft palate, upper portion of tonsils anduvula visible)     Laboratory:  Lab Results   Component Value Date    INR 1.0 07/14/2020       Lab Results   Component Value Date    WBC 10.91 08/17/2020    HGB 8.1 (L) 08/17/2020    HCT 27.1 (L) 08/17/2020    MCV 83 08/17/2020     (H) 08/17/2020      Lab Results   Component Value Date     (H) 08/16/2020     (L) 08/16/2020    K 4.4 08/16/2020     08/16/2020    CO2 23 08/16/2020    BUN 17 08/16/2020    CREATININE 0.9 08/16/2020    CALCIUM 9.4 08/16/2020    MG 2.0 08/17/2020    ALT 20 08/16/2020    AST 20 08/16/2020    ALBUMIN 1.7 (L) 08/16/2020    BILITOT 0.2 08/16/2020       Imaging:  Reviewed.      ASSESSMENT/PLAN:   Pt is 70yoF w/ recent abdominal surgery w/ multiple complications who presents for drain placement in midline and right anterior fluid collections.            Keep pt NPO  Hold anticoagulants  Please make sure to flush the drain in midline fluid collection regularly             Sedation Plan: moderate  Patient will undergo: drain placement in midline & right anterior fluid collections.       Selina Fontana MD  R1 Interventional/Diagnostic Radiology

## 2020-08-17 NOTE — SUBJECTIVE & OBJECTIVE
"Interval HPI:   Overnight events: NAEON. BG within goal ranges on current SQ insulin regimen. NPO this morning for IR drain placement. Remains on TPN.   Eating:   NPO  Nausea: No  Hypoglycemia and intervention: No  Fever: No  TPN and/or TF: Yes  If yes, type of TF/TPN and rate: TPN at 75 cc/hr    BP (!) 146/71 (BP Location: Left arm, Patient Position: Lying)   Pulse 86   Temp 98.9 °F (37.2 °C) (Oral)   Resp 16   Ht 5' 1" (1.549 m)   Wt 70.7 kg (155 lb 13.8 oz)   LMP  (LMP Unknown)   SpO2 (!) 91%   Breastfeeding No   BMI 29.45 kg/m²     Labs Reviewed and Include    No results for input(s): GLU, CALCIUM, ALBUMIN, PROT, NA, K, CO2, CL, BUN, CREATININE, ALKPHOS, ALT, AST, BILITOT in the last 24 hours.  Lab Results   Component Value Date    WBC 10.91 08/17/2020    HGB 8.1 (L) 08/17/2020    HCT 27.1 (L) 08/17/2020    MCV 83 08/17/2020     (H) 08/17/2020     No results for input(s): TSH, FREET4 in the last 168 hours.  Lab Results   Component Value Date    HGBA1C 6.7 (H) 07/09/2020       Nutritional status:   Body mass index is 29.45 kg/m².  Lab Results   Component Value Date    ALBUMIN 1.7 (L) 08/16/2020    ALBUMIN 1.7 (L) 08/15/2020    ALBUMIN 1.6 (L) 08/14/2020     Lab Results   Component Value Date    PREALBUMIN 21 07/15/2020       Estimated Creatinine Clearance: 53.1 mL/min (based on SCr of 0.9 mg/dL).    Accu-Checks  Recent Labs     08/15/20  1648 08/15/20  2009 08/16/20  0004 08/16/20  0419 08/16/20  0745 08/16/20  1201 08/16/20  1735 08/16/20  2003 08/17/20  0021 08/17/20  0421   POCTGLUCOSE 160* 159* 138* 196* 187* 140* 197* 119* 142* 173*       Current Medications and/or Treatments Impacting Glycemic Control  Immunotherapy:    Immunosuppressants     None        Steroids:   Hormones (From admission, onward)    None        Pressors:    Autonomic Drugs (From admission, onward)    None        Hyperglycemia/Diabetes Medications:   Antihyperglycemics (From admission, onward)    Start     Stop Route " Frequency Ordered    08/15/20 1200  insulin aspart U-100 pen 8 Units      -- SubQ Every 24 hours (non-standard times) 08/14/20 1308    08/15/20 0800  insulin aspart U-100 pen 8 Units      -- SubQ Every 24 hours (non-standard times) 08/14/20 1308    08/15/20 0400  insulin aspart U-100 pen 8 Units      -- SubQ Every 24 hours (non-standard times) 08/14/20 1308    08/15/20 0000  insulin aspart U-100 pen 8 Units      -- SubQ Every 24 hours (non-standard times) 08/14/20 1308    08/14/20 2000  insulin aspart U-100 pen 8 Units      -- SubQ Every 24 hours (non-standard times) 08/14/20 1308    08/14/20 1600  insulin aspart U-100 pen 8 Units      -- SubQ Every 24 hours (non-standard times) 08/14/20 1308    08/14/20 1409  insulin aspart U-100 pen 0-5 Units      -- SubQ Every 4 hours PRN 08/14/20 1305

## 2020-08-17 NOTE — PLAN OF CARE
Problem: Occupational Therapy Goal  Goal: Occupational Therapy Goal  Description: Goals to be met by: 8/18/20     Patient will increase functional independence with ADLs by performing:    Feeding with [place].  UE Dressing with [place].  LE Dressing with Stand-by Assistance.  Grooming while standing at sink with Supervision.  Toileting from toilet with Supervision for hygiene and clothing management. -MET 8/13  Bathing from  standing at sink with Supervision.  Toilet transfer to toilet with Supervision.    Outcome: Ongoing, Progressing    Continue with POC.  Mary Dickerson OT  8/17/2020

## 2020-08-17 NOTE — PLAN OF CARE
Vital signs stable, patient has complaints of 10/10 pain throughout the night, pain addressed with PRN medications. Some relief noted due to patient sleeping. Ostomy bag emptied by this RN patient would not even look at ostomy, and only assisted when this RN needed help. Patient voiding per BSC with stand by assist. Patient can be slightly wobbly when getting out of bed, bed alarm on throughout the night. Patient did set off BA when getting up without calling. Encouraged patient to call to get oob, patient did call the remainder of the night. TPN @ 75 & lipids @ 20.8. Abdominal CT performed overnight per MD order patient tolerated well. Drain with scant output noted in bulb, drain remained charged throughout the night. Left upper arm picc dressing clean dry and intact no signs or symptoms of infection noted. Blood glucose checks every 4 hours with Novolog 8 units SQ coverage patient tolerated well. See flow sheet for all intake and output totals.

## 2020-08-17 NOTE — PROCEDURES
Radiology Post-Procedure Note    Pre Op Diagnosis: Abdominal abscess  Post Op Diagnosis: Same    Procedure: CT guided drain placement and attempted aspiration    Procedure performed by: Froylan Mike MD    Written Informed Consent Obtained: Yes  Specimen Removed: YES 8 mL purulent fluid  Estimated Blood Loss: Minimal    Findings:   CT guided 12F drain placed in midline pelvic fluid collection.  8mL purulent fluid removed.  Attempted aspiration of RLQ ventral fluid collection with 18g angio needle.  No fluid able to be removed.  No complications.    Patient tolerated procedure well.    Froylan Mike MD  Diagnostic and Interventional Radiologist  Department of Radiology  Pager: [phone]

## 2020-08-17 NOTE — PLAN OF CARE
Procedure complete, patient tolerated well, all VSS, drain placed on left abdomen,, 8 cc's removed,,, report called to floor nurse, patient will be transferred back to room,,,

## 2020-08-17 NOTE — ASSESSMENT & PLAN NOTE
70 y.o. female 17 Days Post-Op for Procedure(s) (LRB):  RESECTION, COLON, LOW ANTERIOR (N/A)  CYSTOSCOPY (N/A)  CATHETERIZATION, URETER (Bilateral)  CREATION, ILEOSTOMY  LYSIS, ADHESIONS   IR drain placed 8/6: cultures growing Ecoli sensitive to Ertapenem (also has a UTI with the same Ecoli). Drain upsized  By IR on 8/12  Wound vac placed 8/10, removed 8/11  IV antibiotics switched to meropenem by ID and patient placed on contact precautions due to ESBL. Appreciate ID recommendations.    Continue diabetic diet as tolerated, NPO this am for IR drain placement  Added scopolamine patch   Continue TPN   SCDs/DVT prophylaxis  GI prophylaxis  Encourage IS  PT/OT. Encourage to chair and ambulation.  IR today for drain placement for interval increased size in fluid collections    Continue communication with use of

## 2020-08-17 NOTE — PROGRESS NOTES
"[facility]  Endocrinology  Progress Note    Admit Date: 7/15/2020     Reason for Consult: Management of T2DM, Hyperglycemia     Surgical Procedure and Date: n/a    Lab Results   Component Value Date    HGBA1C 6.7 (H) 07/09/2020     Diabetes diagnosis year: 2015    Home Diabetes Medications:  Lantus 10 units daily if am BG >180 and Metformin 1g BID    How often checking glucose at home? once daily   BG readings on regimen: 150-low 200s  Hypoglycemia on the regimen?  No  Missed doses on regimen?  No    Diabetes Complications include:     Hyperglycemia    Complicating diabetes co morbidities:   CKD      HPI:   Patient is a 70 y.o. female with a diagnosis of T2DM, HTN, and complicated surgical history, including h/o colon resection and Aiyana procedure, takedown colostomy, colostomy closure, clot of colo cutaneous fistula. She presnts with abdominal pain and vomiting. 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 CT scan from OSH showed partially obstructed small bowel. She was admitted to [facility]. Endocrinology consulted for management of T2DM. Of note, patient's primary language is [place].  An  was used for this consult.       Interval HPI:   Overnight events: NAEON. BG within goal ranges on current SQ insulin regimen. NPO this morning for IR drain placement. Remains on TPN.   Eating:   NPO  Nausea: No  Hypoglycemia and intervention: No  Fever: No  TPN and/or TF: Yes  If yes, type of TF/TPN and rate: TPN at 75 cc/hr    BP (!) 146/71 (BP Location: Left arm, Patient Position: Lying)   Pulse 86   Temp 98.9 °F (37.2 °C) (Oral)   Resp 16   Ht 5' 1" (1.549 m)   Wt 70.7 kg (155 lb 13.8 oz)   LMP  (LMP Unknown)   SpO2 (!) 91%   Breastfeeding No   BMI 29.45 kg/m²     Labs Reviewed and Include    No results for input(s): GLU, CALCIUM, ALBUMIN, PROT, NA, K, CO2, CL, BUN, CREATININE, ALKPHOS, ALT, AST, BILITOT in the last 24 hours.  Lab " Results   Component Value Date    WBC 10.91 08/17/2020    HGB 8.1 (L) 08/17/2020    HCT 27.1 (L) 08/17/2020    MCV 83 08/17/2020     (H) 08/17/2020     No results for input(s): TSH, FREET4 in the last 168 hours.  Lab Results   Component Value Date    HGBA1C 6.7 (H) 07/09/2020       Nutritional status:   Body mass index is 29.45 kg/m².  Lab Results   Component Value Date    ALBUMIN 1.7 (L) 08/16/2020    ALBUMIN 1.7 (L) 08/15/2020    ALBUMIN 1.6 (L) 08/14/2020     Lab Results   Component Value Date    PREALBUMIN 21 07/15/2020       Estimated Creatinine Clearance: 53.1 mL/min (based on SCr of 0.9 mg/dL).    Accu-Checks  Recent Labs     08/15/20  1648 08/15/20  2009 08/16/20  0004 08/16/20  0419 08/16/20  0745 08/16/20  1201 08/16/20  1735 08/16/20  2003 08/17/20  0021 08/17/20  0421   POCTGLUCOSE 160* 159* 138* 196* 187* 140* 197* 119* 142* 173*       Current Medications and/or Treatments Impacting Glycemic Control  Immunotherapy:    Immunosuppressants     None        Steroids:   Hormones (From admission, onward)    None        Pressors:    Autonomic Drugs (From admission, onward)    None        Hyperglycemia/Diabetes Medications:   Antihyperglycemics (From admission, onward)    Start     Stop Route Frequency Ordered    08/15/20 1200  insulin aspart U-100 pen 8 Units      -- SubQ Every 24 hours (non-standard times) 08/14/20 1308    08/15/20 0800  insulin aspart U-100 pen 8 Units      -- SubQ Every 24 hours (non-standard times) 08/14/20 1308    08/15/20 0400  insulin aspart U-100 pen 8 Units      -- SubQ Every 24 hours (non-standard times) 08/14/20 1308    08/15/20 0000  insulin aspart U-100 pen 8 Units      -- SubQ Every 24 hours (non-standard times) 08/14/20 1308    08/14/20 2000  insulin aspart U-100 pen 8 Units      -- SubQ Every 24 hours (non-standard times) 08/14/20 1308    08/14/20 1600  insulin aspart U-100 pen 8 Units      -- SubQ Every 24 hours (non-standard times) 08/14/20 1308    08/14/20 1402   insulin aspart U-100 pen 0-5 Units      -- SubQ Every 4 hours PRN 08/14/20 1309          ASSESSMENT and PLAN    * Cutaneous fistula  Managed per primary team  Optimize BG control to improve wound healing        Type 2 diabetes mellitus without complication, with long-term current use of insulin  BG goal 140-180    - Novolog 8 units q 4 hrs while on TPN. HOLD if TPN is stopped for any reason.   - Low Dose Correction Scale PRN BG excursions.   - BG monitoring q 4 hrs while on TPN (coordinate with meals during the day)     ** Please call Endocrine for any BG related issues **  ** Please call Endocrine if TPN is restarted or diet changes **    Discharge plans:  TBD. Please notify endocrinology prior to discharge.       Chronic kidney disease, stage 3  Titrate insulin slowly  Avoid insulin stacking      Essential hypertension  Managed per primary team  Condition may cause insulin resistance             Va Genao, TYRELL  Endocrinology  [facility]

## 2020-08-17 NOTE — PT/OT/SLP PROGRESS
Occupational Therapy   Treatment    Name: Azucena Morrison  MRN: [id number]  Admitting Diagnosis:  Cutaneous fistula  17 Days Post-Op   Procedure(s):  RESECTION, COLON, LOW ANTERIOR  CYSTOSCOPY  CATHETERIZATION, URETER  CREATION, ILEOSTOMY  LYSIS, ADHESIONS     Recommendations:     Discharge Recommendations: home health OT  Discharge Equipment Recommendations:  walker, rolling  Barriers to discharge:  None    Assessment:     Azucena Morrison is a 70 y.o. female with a medical diagnosis of Cutaneous fistula.  She presents with decreased safety awareness with patient ambulating in room by herself with her bed alarm going off and patient without insight into her lines requiring max verbal cueing to attend to lines and for safety. Patient reports need to use the restroom and is agreeable to participate in functional mobility following that. Performance deficits affecting function are weakness, impaired endurance, impaired self care skills, impaired functional mobilty, gait instability, decreased safety awareness, pain, impaired skin.     Rehab Prognosis:  Good; patient would benefit from acute skilled OT services to address these deficits and reach maximum level of function.       Plan:     Patient to be seen 3 x/week to address the above listed problems via self-care/home management, therapeutic activities, therapeutic exercises  · Plan of Care Expires: 08/31/20  · Plan of Care Reviewed with: patient    Subjective     Pain/Comfort:  Pain Rating 1: (did not rate)  Location 1: abdomen  Pain Addressed 1: Nurse notified  Pain Rating Post-Intervention 1: 10/10    Objective:     Communicated with: RN prior to session.  Patient found ambulating in her room with peripheral IV, telemetry, wound vac, bed alarm upon OT entry to room.    General Precautions: Standard, contact, fall   Orthopedic Precautions:N/A   Braces: N/A     Occupational Performance:     Bed Mobility:    · Did not observe    Functional  Mobility/Transfers:  · Patient completed Sit <> Stand Transfer x2 trials with supervision with no assistive device   · Patient completed Toilet Transfer onto the bedside commode using Step Transfer technique with supervision with no AD  · Functional Mobility: ~170' with SBA and no AD with patient managing IV pole    Activities of Daily Living:  · Toileting: independence Patient completed toileting on the bedside commode independently.    [facility] 6 Click ADL: 20    Treatment & Education:  Role of OT/POC  Call button for assistance    Patient left up in chair with all lines intact, call button in reach and RN notifiedEducation:      GOALS:   Multidisciplinary Problems     Occupational Therapy Goals        Problem: Occupational Therapy Goal    Goal Priority Disciplines Outcome Interventions   Occupational Therapy Goal     OT, PT/OT Ongoing, Progressing    Description: Goals to be met by: 8/18/20     Patient will increase functional independence with ADLs by performing:    Feeding with [place].  UE Dressing with [place].  LE Dressing with Stand-by Assistance.  Grooming while standing at sink with Supervision.  Toileting from toilet with Supervision for hygiene and clothing management. -MET 8/13  Bathing from  standing at sink with Supervision.  Toilet transfer to toilet with Supervision.                     Time Tracking:     OT Date of Treatment: 08/17/20  OT Start Time: 1326  OT Stop Time: 1349  OT Total Time (min): 23 min    Billable Minutes:Self Care/Home Management 10 minutes  Therapeutic Activity 13 minutes    Mary Dickerson OT  8/17/2020     Line Functional

## 2020-08-17 NOTE — ASSESSMENT & PLAN NOTE
Continue standing tylenol, ibuprofen, gabapentin  PCA discontinued  PO oxycodone PRN for pain control    CT w/ interval increase in size in both midline and right sided anterior abdominal fluid collections, patient remaining NPO for IR drain placement today  IR consulted

## 2020-08-17 NOTE — PROGRESS NOTES
Wound care follow up for RLQ ileostomy and wound vac.    Received pt lying in bed with eyes closed with respirations even and unlabored. Wet to dry dressing intact to midline abdomen with ostomy pouch intact to RLQ. Chart reflects per Dr. Patino progress note from this morning that the wound vac was removed overnight with a wet to dry dressing applied as directed.    Upon assessment of ileostomy to RLQ: ileostomy pouch remained intact over the weekend with flatus and brownish green liquid stool noted to bag. Stoma measures 25 mm is red, rosebud in appearance with mucosal separation noted to the inferior portion of the peristomal area. Stoma powder applied to mucosal separation with a coloplast brava thin moldable ring applied around peristomal skin with a deep convexity wound pouch reapplied with belt.     To abdominal midline: Pt has a full thickness wound measuring 4 cm x 1.5 cm x 2 cm to the superior abdominal midline and a full thickness wound measuring 6 cm x 4 cm x 2 cm to inferior abdominal wound with undermining at 12 o'clock measuring 4 cm. Intact staples are noted to the periphery of both wounds. To the inferior abdominal midline sutures are noted to the base of the wound. Cleansed both wounds with sterile normal saline with silver non adherent contact layer applied to base of inferior wound and along incisional midline. Good seal intact with wound vac running at 125mmHg continuous setting.    (see assessment and photo below)    Pt tolerated dressing change well with pt's nurse at bedside aware of care given. Additional deep convexity pouches are at bedside. Pt is aware that the deep convexity pouches are the best option for her at this time.     Wound care will continue to follow pt for wound vac dressing changes and ileostomy management. [id number]          Superior midline wound measuring 4 cm x 1.5 cm x 2 cm    Inferior abdominal midline wound measuring 6 cm x 4 cm x 2 cm with undermining at 12 o'clock  measuring 4 cm                   08/17/20 1033        Colostomy 07/31/20 1943 Loop RLQ   Placement Date/Time: 07/31/20 1943   Present Prior to Hospital Arrival?: No  Inserted by: MD  Colostomy Type: Loop  Location: RLQ   Stomal Appliance 1 piece;Dry;Intact;Changed;Convexity  (deep convexity)   Stoma Appearance rosebud appearance;moist;red;mucocutaneous separation   Stoma Size (in) 25   Site Assessment Mucocutaneous separation;Protruding above skin level   Peristomal Assessment Intact;Moist   Accessories/Skin Care appliance belt;cleansed w/ sterile normal saline;skin barrier ring   Stoma Function flatus;thin liquid   Treatment Bag change;Site care   Tolerance no signs/symptoms of discomfort   Output (mL) 200 mL        Negative Pressure Wound Therapy  08/14/20 midline   Placement Date: 08/14/20   Orientation: midline  Location: Abdomen   NPWT Type Vacuum Therapy   Therapy Setting NPWT Continuous therapy   Pressure Setting NPWT 125 mmHg   Therapy Interventions NPWT Dressing changed;Seal intact   Sponges Inserted NPWT Black;1;Other  (silver non adherent)

## 2020-08-18 LAB
ALBUMIN SERPL BCP-MCNC: 1.8 G/DL (ref 3.5–5.2)
ALP SERPL-CCNC: 209 U/L (ref 55–135)
ALT SERPL W/O P-5'-P-CCNC: 17 U/L (ref 10–44)
ANION GAP SERPL CALC-SCNC: 8 MMOL/L (ref 8–16)
AST SERPL-CCNC: 30 U/L (ref 10–40)
BASOPHILS # BLD AUTO: 0.03 K/UL (ref 0–0.2)
BASOPHILS NFR BLD: 0.3 % (ref 0–1.9)
BILIRUB SERPL-MCNC: 0.2 MG/DL (ref 0.1–1)
BUN SERPL-MCNC: 24 MG/DL (ref 8–23)
CALCIUM SERPL-MCNC: 8.9 MG/DL (ref 8.7–10.5)
CHLORIDE SERPL-SCNC: 98 MMOL/L (ref 95–110)
CO2 SERPL-SCNC: 26 MMOL/L (ref 23–29)
CREAT SERPL-MCNC: 0.8 MG/DL (ref 0.5–1.4)
CRP SERPL-MCNC: 99.2 MG/L (ref 0–8.2)
DIFFERENTIAL METHOD: ABNORMAL
EOSINOPHIL # BLD AUTO: 0.6 K/UL (ref 0–0.5)
EOSINOPHIL NFR BLD: 6.2 % (ref 0–8)
ERYTHROCYTE [DISTWIDTH] IN BLOOD BY AUTOMATED COUNT: 17.3 % (ref 11.5–14.5)
EST. GFR  (AFRICAN AMERICAN): >60 ML/MIN/1.73 M^2
EST. GFR  (NON AFRICAN AMERICAN): >60 ML/MIN/1.73 M^2
GLUCOSE SERPL-MCNC: 77 MG/DL (ref 70–110)
HCT VFR BLD AUTO: 25.3 % (ref 37–48.5)
HGB BLD-MCNC: 7.8 G/DL (ref 12–16)
IMM GRANULOCYTES # BLD AUTO: 0.1 K/UL (ref 0–0.04)
IMM GRANULOCYTES NFR BLD AUTO: 1 % (ref 0–0.5)
LYMPHOCYTES # BLD AUTO: 1.8 K/UL (ref 1–4.8)
LYMPHOCYTES NFR BLD: 18.6 % (ref 18–48)
MAGNESIUM SERPL-MCNC: 1.9 MG/DL (ref 1.6–2.6)
MCH RBC QN AUTO: 25.1 PG (ref 27–31)
MCHC RBC AUTO-ENTMCNC: 30.8 G/DL (ref 32–36)
MCV RBC AUTO: 81 FL (ref 82–98)
MONOCYTES # BLD AUTO: 0.7 K/UL (ref 0.3–1)
MONOCYTES NFR BLD: 7.5 % (ref 4–15)
NEUTROPHILS # BLD AUTO: 6.5 K/UL (ref 1.8–7.7)
NEUTROPHILS NFR BLD: 66.4 % (ref 38–73)
NRBC BLD-RTO: 0 /100 WBC
PHOSPHATE SERPL-MCNC: 4 MG/DL (ref 2.7–4.5)
PLATELET # BLD AUTO: 811 K/UL (ref 150–350)
PMV BLD AUTO: 8.3 FL (ref 9.2–12.9)
POCT GLUCOSE: 164 MG/DL (ref 70–110)
POCT GLUCOSE: 171 MG/DL (ref 70–110)
POCT GLUCOSE: 178 MG/DL (ref 70–110)
POCT GLUCOSE: 189 MG/DL (ref 70–110)
POCT GLUCOSE: 72 MG/DL (ref 70–110)
POTASSIUM SERPL-SCNC: 4.1 MMOL/L (ref 3.5–5.1)
PROT SERPL-MCNC: 8 G/DL (ref 6–8.4)
RBC # BLD AUTO: 3.11 M/UL (ref 4–5.4)
SODIUM SERPL-SCNC: 132 MMOL/L (ref 136–145)
WBC # BLD AUTO: 9.82 K/UL (ref 3.9–12.7)

## 2020-08-18 PROCEDURE — 83735 ASSAY OF MAGNESIUM: CPT

## 2020-08-18 PROCEDURE — 25000003 PHARM REV CODE 250: Performed by: STUDENT IN AN ORGANIZED HEALTH CARE EDUCATION/TRAINING PROGRAM

## 2020-08-18 PROCEDURE — 20600001 HC STEP DOWN PRIVATE ROOM

## 2020-08-18 PROCEDURE — 63600175 PHARM REV CODE 636 W HCPCS: Performed by: STUDENT IN AN ORGANIZED HEALTH CARE EDUCATION/TRAINING PROGRAM

## 2020-08-18 PROCEDURE — 25000003 PHARM REV CODE 250: Performed by: PHYSICIAN ASSISTANT

## 2020-08-18 PROCEDURE — 85025 COMPLETE CBC W/AUTO DIFF WBC: CPT

## 2020-08-18 PROCEDURE — 80053 COMPREHEN METABOLIC PANEL: CPT

## 2020-08-18 PROCEDURE — 25000242 PHARM REV CODE 250 ALT 637 W/ HCPCS: Performed by: COLON & RECTAL SURGERY

## 2020-08-18 PROCEDURE — C9113 INJ PANTOPRAZOLE SODIUM, VIA: HCPCS | Performed by: STUDENT IN AN ORGANIZED HEALTH CARE EDUCATION/TRAINING PROGRAM

## 2020-08-18 PROCEDURE — 63600175 PHARM REV CODE 636 W HCPCS: Performed by: PHYSICIAN ASSISTANT

## 2020-08-18 PROCEDURE — 86140 C-REACTIVE PROTEIN: CPT

## 2020-08-18 PROCEDURE — A4217 STERILE WATER/SALINE, 500 ML: HCPCS | Performed by: STUDENT IN AN ORGANIZED HEALTH CARE EDUCATION/TRAINING PROGRAM

## 2020-08-18 PROCEDURE — A4216 STERILE WATER/SALINE, 10 ML: HCPCS | Performed by: STUDENT IN AN ORGANIZED HEALTH CARE EDUCATION/TRAINING PROGRAM

## 2020-08-18 PROCEDURE — 99232 PR SUBSEQUENT HOSPITAL CARE,LEVL II: ICD-10-PCS | Mod: ,,, | Performed by: NURSE PRACTITIONER

## 2020-08-18 PROCEDURE — 97607 NEG PRS WND THR NDME<=50SQCM: CPT

## 2020-08-18 PROCEDURE — 84100 ASSAY OF PHOSPHORUS: CPT

## 2020-08-18 PROCEDURE — 99232 SBSQ HOSP IP/OBS MODERATE 35: CPT | Mod: ,,, | Performed by: NURSE PRACTITIONER

## 2020-08-18 RX ORDER — INSULIN ASPART 100 [IU]/ML
6 INJECTION, SOLUTION INTRAVENOUS; SUBCUTANEOUS
Status: DISCONTINUED | OUTPATIENT
Start: 2020-08-18 | End: 2020-08-21

## 2020-08-18 RX ORDER — INSULIN ASPART 100 [IU]/ML
6 INJECTION, SOLUTION INTRAVENOUS; SUBCUTANEOUS
Status: DISCONTINUED | OUTPATIENT
Start: 2020-08-19 | End: 2020-08-21

## 2020-08-18 RX ADMIN — ACETAMINOPHEN 1000 MG: 500 TABLET ORAL at 12:08

## 2020-08-18 RX ADMIN — CARVEDILOL 12.5 MG: 12.5 TABLET, FILM COATED ORAL at 08:08

## 2020-08-18 RX ADMIN — MEROPENEM 2 G: 1 INJECTION, POWDER, FOR SOLUTION INTRAVENOUS at 07:08

## 2020-08-18 RX ADMIN — CARVEDILOL 12.5 MG: 12.5 TABLET, FILM COATED ORAL at 09:08

## 2020-08-18 RX ADMIN — IBUPROFEN 400 MG: 400 TABLET, FILM COATED ORAL at 03:08

## 2020-08-18 RX ADMIN — HYDROCHLOROTHIAZIDE 25 MG: 25 TABLET ORAL at 09:08

## 2020-08-18 RX ADMIN — MEROPENEM 2 G: 1 INJECTION, POWDER, FOR SOLUTION INTRAVENOUS at 09:08

## 2020-08-18 RX ADMIN — GABAPENTIN 300 MG: 300 CAPSULE ORAL at 09:08

## 2020-08-18 RX ADMIN — Medication 10 ML: at 06:08

## 2020-08-18 RX ADMIN — INSULIN ASPART 6 UNITS: 100 INJECTION, SOLUTION INTRAVENOUS; SUBCUTANEOUS at 09:08

## 2020-08-18 RX ADMIN — IBUPROFEN 400 MG: 400 TABLET, FILM COATED ORAL at 09:08

## 2020-08-18 RX ADMIN — INSULIN ASPART 8 UNITS: 100 INJECTION, SOLUTION INTRAVENOUS; SUBCUTANEOUS at 12:08

## 2020-08-18 RX ADMIN — ENOXAPARIN SODIUM 40 MG: 40 INJECTION SUBCUTANEOUS at 07:08

## 2020-08-18 RX ADMIN — MAGNESIUM SULFATE HEPTAHYDRATE: 500 INJECTION, SOLUTION INTRAMUSCULAR; INTRAVENOUS at 11:08

## 2020-08-18 RX ADMIN — LIDOCAINE 1 PATCH: 50 PATCH TOPICAL at 07:08

## 2020-08-18 RX ADMIN — METOCLOPRAMIDE 10 MG: 5 TABLET ORAL at 12:08

## 2020-08-18 RX ADMIN — Medication 10 ML: at 12:08

## 2020-08-18 RX ADMIN — INSULIN ASPART 6 UNITS: 100 INJECTION, SOLUTION INTRAVENOUS; SUBCUTANEOUS at 12:08

## 2020-08-18 RX ADMIN — MEROPENEM 2 G: 1 INJECTION, POWDER, FOR SOLUTION INTRAVENOUS at 11:08

## 2020-08-18 RX ADMIN — ACETAMINOPHEN 1000 MG: 500 TABLET ORAL at 06:08

## 2020-08-18 RX ADMIN — PSYLLIUM HUSK 1 PACKET: 3.4 POWDER ORAL at 09:08

## 2020-08-18 RX ADMIN — HYDROMORPHONE HYDROCHLORIDE 1 MG: 1 INJECTION, SOLUTION INTRAMUSCULAR; INTRAVENOUS; SUBCUTANEOUS at 09:08

## 2020-08-18 RX ADMIN — INSULIN ASPART 6 UNITS: 100 INJECTION, SOLUTION INTRAVENOUS; SUBCUTANEOUS at 04:08

## 2020-08-18 RX ADMIN — MEROPENEM 2 G: 1 INJECTION, POWDER, FOR SOLUTION INTRAVENOUS at 12:08

## 2020-08-18 RX ADMIN — IBUPROFEN 400 MG: 400 TABLET, FILM COATED ORAL at 08:08

## 2020-08-18 RX ADMIN — METOCLOPRAMIDE 10 MG: 5 TABLET ORAL at 06:08

## 2020-08-18 RX ADMIN — PANTOPRAZOLE SODIUM 40 MG: 40 INJECTION, POWDER, LYOPHILIZED, FOR SOLUTION INTRAVENOUS at 09:08

## 2020-08-18 RX ADMIN — HYDROMORPHONE HYDROCHLORIDE 1 MG: 1 INJECTION, SOLUTION INTRAMUSCULAR; INTRAVENOUS; SUBCUTANEOUS at 04:08

## 2020-08-18 RX ADMIN — INSULIN ASPART 6 UNITS: 100 INJECTION, SOLUTION INTRAVENOUS; SUBCUTANEOUS at 08:08

## 2020-08-18 RX ADMIN — HYDROMORPHONE HYDROCHLORIDE 1 MG: 1 INJECTION, SOLUTION INTRAMUSCULAR; INTRAVENOUS; SUBCUTANEOUS at 07:08

## 2020-08-18 RX ADMIN — GABAPENTIN 300 MG: 300 CAPSULE ORAL at 07:08

## 2020-08-18 RX ADMIN — METOCLOPRAMIDE 10 MG: 5 TABLET ORAL at 07:08

## 2020-08-18 NOTE — PLAN OF CARE
"Patient is not medically ready for discharge.     "IR drain placed yesterday and attempt at aspiration of R side collection. 8+20 cc serosanguinous output from drain. Pt feels well. Has eaten a little. No significant nausea. Ostomy with 325 cc brown output. Wound vac replaced then taken down again yesterday."     08/18/20 1201   Discharge Reassessment   Assessment Type Discharge Planning Reassessment   Discharge Plan A Home Health   Discharge Plan B Long-term acute care facility (LTAC)   DME Needed Upon Discharge  walker, rolling   Anticipated Discharge Disposition Home-Health   Can the patient/caregiver answer the patient profile reliably? Yes, cognitively intact     Rosy Lynch RN, CM   Ext: [phone]    "

## 2020-08-18 NOTE — ASSESSMENT & PLAN NOTE
BG goal 140-180    - Decrease Novolog to 6 units q 4 hrs while on TPN. HOLD if TPN is stopped for any reason. (20% dose decrease)   - Low Dose Correction Scale PRN BG excursions.   - BG monitoring q 4 hrs while on TPN (coordinate with meals during the day)     ** Please call Endocrine for any BG related issues **  ** Please call Endocrine if TPN is restarted or diet changes **    Discharge plans:  TBD. Please notify endocrinology prior to discharge.

## 2020-08-18 NOTE — PROGRESS NOTES
[facility]  Colorectal Surgery  Progress Note    Patient Name: Azucena Morrison  MRN: [id number]  Admission Date: 7/15/2020  Hospital Length of Stay: 34 days  Attending Physician: Fabiana Valiente MD    Subjective:     Interval History: no acute events. IR drain placed yesterday and attempt at aspiration of R side collection. 8+20 cc serosanguinous output from drain. Pt feels well. Has eaten a little. No significant nausea. Ostomy with 325 cc brown output. Wound vac replaced then taken down again yesterday.    Post-Op Info:  Procedure(s) (LRB):  RESECTION, COLON, LOW ANTERIOR (N/A)  CYSTOSCOPY (N/A)  CATHETERIZATION, URETER (Bilateral)  CREATION, ILEOSTOMY  LYSIS, ADHESIONS   18 Days Post-Op      Medications:  Continuous Infusions:   dextrose 10 % in water (D10W)      TPN ADULT CENTRAL LINE CUSTOM 75 mL/hr at 08/17/20 2149    TPN ADULT CENTRAL LINE CUSTOM       Scheduled Meds:   acetaminophen  1,000 mg Oral Q6H    carvediloL  12.5 mg Oral BID    enoxaparin  40 mg Subcutaneous Q24H    fat emulsion 20%  250 mL Intravenous Daily    gabapentin  300 mg Oral TID    hydroCHLOROthiazide  25 mg Oral Daily    ibuprofen  400 mg Oral TID    [START ON 8/19/2020] insulin aspart U-100  6 Units Subcutaneous Q24H    [START ON 8/19/2020] insulin aspart U-100  6 Units Subcutaneous Q24H    insulin aspart U-100  6 Units Subcutaneous Q24H    insulin aspart U-100  6 Units Subcutaneous Q24H    insulin aspart U-100  6 Units Subcutaneous Q24H    insulin aspart U-100  6 Units Subcutaneous Q24H    lidocaine  1 patch Transdermal Q24H    meropenem (MERREM) IVPB  2 g Intravenous Q8H    metoclopramide HCl  10 mg Oral TID AC    pantoprozole (PROTONIX) IV  40 mg Intravenous Q24H    psyllium husk (aspartame)  1 packet Oral Daily    scopolamine  1 patch Transdermal Q3 Days    sodium chloride 0.9%  10 mL Intravenous Q6H     PRN Meds:   sodium chloride    calcium carbonate    dextrose 10 % in water (D10W)     dextrose 50%    glucagon (human recombinant)    HYDROmorphone    HYDROmorphone    insulin aspart U-100    iohexol    naloxone    ondansetron    ondansetron    oxyCODONE    oxyCODONE    promethazine (PHENERGAN) IVPB    sodium chloride 0.9%    sodium chloride 0.9%    traMADoL        Objective:     Vital Signs (Most Recent):  Temp: 98.1 °F (36.7 °C) (08/18/20 0435)  Pulse: 80 (08/18/20 0435)  Resp: 18 (08/18/20 0446)  BP: 117/65 (08/18/20 0435)  SpO2: 96 % (08/18/20 0435) Vital Signs (24h Range):  Temp:  [97.3 °F (36.3 °C)-98.1 °F (36.7 °C)] 98.1 °F (36.7 °C)  Pulse:  [78-83] 80  Resp:  [12-20] 18  SpO2:  [95 %-98 %] 96 %  BP: (117-165)/(65-88) 117/65     Intake/Output - Last 3 Shifts       08/16 0700 - 08/17 0659 08/17 0700 - 08/18 0659 08/18 0700 - 08/19 0659    P.O. 1830      I.V. (mL/kg) 20 (0.3)      IV Piggyback 300 300     TPN 1288.1 289.3 590.8    Total Intake(mL/kg) 3438.1 (48.6) 589.3 (8.3) 590.8 (8.4)    Urine (mL/kg/hr) 2650 (1.6) 2350 (1.4)     Drains 0 20     Other 0      Stool 1525 325     Total Output 4175 2695     Net -736.9 -2105.7 +590.8           Stool Occurrence 0 x 0 x           Physical Exam  Constitutional:       General: She is not in acute distress.     Appearance: Normal appearance.   HENT:      Head: Normocephalic and atraumatic.      Right Ear: External ear normal.      Left Ear: External ear normal.      Nose: Nose normal.      Mouth/Throat:      Mouth: Mucous membranes are moist.   Eyes:      Pupils: Pupils are equal, round, and reactive to light.   Neck:      Musculoskeletal: Normal range of motion.   Cardiovascular:      Rate and Rhythm: Normal rate.   Pulmonary:      Effort: Pulmonary effort is normal. No respiratory distress.      Comments: JOSEPH, no supplemental oxygen req  Abdominal:      General: There is no distension.      Palpations: Abdomen is soft.      Comments: Ostomy pink and patent, green bowel contents and gas in bag. Midline incision packed with gauze,  no purulence. Two left abdomen IR drains with serosanguinous in bulb   Musculoskeletal: Normal range of motion.   Skin:     General: Skin is warm and dry.   Neurological:      General: No focal deficit present.      Mental Status: She is alert and oriented to person, place, and time.   Psychiatric:         Mood and Affect: Mood normal.         Behavior: Behavior normal.         Significant Labs:  CBC (Last 3 Results):   Recent Labs   Lab 08/16/20 0621 08/17/20 0618 08/18/20  0420   WBC 11.77 10.91 9.82   RBC 3.13* 3.26* 3.11*   HGB 8.0* 8.1* 7.8*   HCT 25.6* 27.1* 25.3*   * 850* 811*   MCV 82 83 81*   MCH 25.6* 24.8* 25.1*   MCHC 31.3* 29.9* 30.8*     CMP (Last 3 Results):   Recent Labs   Lab 08/16/20 0621 08/17/20 0618 08/18/20  0420   * 119* 77   CALCIUM 9.4 10.0 8.9   ALBUMIN 1.7* 1.7* 1.8*   PROT 8.4 7.8 8.0   * 133* 132*   K 4.4 4.4 4.1   CO2 23 23 26    100 98   BUN 17 20 24*   CREATININE 0.9 0.8 0.8   ALKPHOS 245* 213* 209*   ALT 20 18 17   AST 20 22 30   BILITOT 0.2 0.2 0.2     CRP (Last 3 Results):   Recent Labs   Lab 08/16/20 0621 08/17/20 0618 08/18/20  0420   .5* 110.2* 99.2*       Significant Diagnostics:  None new    Assessment/Plan:     * Cutaneous fistula  70 y.o. female 18 Days Post-Op for Procedure(s) (LRB):  RESECTION, COLON, LOW ANTERIOR (N/A)  CYSTOSCOPY (N/A)  CATHETERIZATION, URETER (Bilateral)  CREATION, ILEOSTOMY  LYSIS, ADHESIONS   IR drain placed 8/6: cultures growing Ecoli sensitive to Ertapenem (also has a UTI with the same Ecoli). Drain upsized  By IR on 8/12  And additional drain placed 8/17  IV antibiotics switched to meropenem by ID and patient placed on contact precautions due to ESBL. Appreciate ID recommendations.    Continue diabetic diet as tolerated  Added scopolamine patch   Continue TPN   SCDs/DVT prophylaxis  GI prophylaxis  Encourage IS  PT/OT. Encourage to chair and ambulation.  IR today for drain placement for interval increased size  in fluid collections    Continue communication with use of       On total parenteral nutrition (TPN)  Reorder.    UTI (urinary tract infection)  Cultures growing E. Coli sensitive to ertapenem. First dose given 8/7. Infectious diseases consulted. Switched to meropenem on 8/13 by ID for ESBL E. Coli. Anticipated long course IV antibiotic treatment. Appreciate recommendations.     Acute blood loss anemia  H/H has remained stable, f/u morning labs      Chronic kidney disease, stage 3  Trend BUN/Cr  Monitor UOP    Generalized abdominal pain  Continue standing tylenol, ibuprofen, gabapentin  PCA discontinued  PO oxycodone PRN for pain control    CT w/ interval increase in size in both midline and right sided anterior abdominal fluid collections, patient remaining NPO for IR drain placement today  IR consulted    Essential hypertension  Home medications resumed  Can increase carvedilol as needed if BP still elevated    Type 2 diabetes mellitus without complication, with long-term current use of insulin  SSI  On scheduled insulin ordered by Endo team.  Accu check Q6    Endocrinology consulted. Appreciate their recommendations.           Pat Turner MD  Colorectal Surgery  [facility]

## 2020-08-18 NOTE — PLAN OF CARE
POC reviewed w/ pt, verbalized understanding. Pt AAOx4. VSS on RA. Wet to dry dressing to ML incision. x2 IR drains LLQ. IR drain placed yesterday, unable to hold suction; MD on call notified. Pain managed w/ PRN pain meds. Pt voids per BSC w/ adequate UOP overnight. Pt tolerated clear liquid diet. Pt denies any N/V overnight. Pt slept b/w care. Contact precautions maintained. Frequent rounds made for pt safety. Call light in reach. Bed in lowest position and locked. WCTM.

## 2020-08-18 NOTE — SUBJECTIVE & OBJECTIVE
Subjective:     Interval History: no acute events. IR drain placed yesterday and attempt at aspiration of R side collection. 8+20 cc serosanguinous output from drain. Pt feels well. Has eaten a little. No significant nausea. Ostomy with 325 cc brown output. Wound vac replaced then taken down again yesterday.    Post-Op Info:  Procedure(s) (LRB):  RESECTION, COLON, LOW ANTERIOR (N/A)  CYSTOSCOPY (N/A)  CATHETERIZATION, URETER (Bilateral)  CREATION, ILEOSTOMY  LYSIS, ADHESIONS   18 Days Post-Op      Medications:  Continuous Infusions:   dextrose 10 % in water (D10W)      TPN ADULT CENTRAL LINE CUSTOM 75 mL/hr at 08/17/20 2149    TPN ADULT CENTRAL LINE CUSTOM       Scheduled Meds:   acetaminophen  1,000 mg Oral Q6H    carvediloL  12.5 mg Oral BID    enoxaparin  40 mg Subcutaneous Q24H    fat emulsion 20%  250 mL Intravenous Daily    gabapentin  300 mg Oral TID    hydroCHLOROthiazide  25 mg Oral Daily    ibuprofen  400 mg Oral TID    [START ON 8/19/2020] insulin aspart U-100  6 Units Subcutaneous Q24H    [START ON 8/19/2020] insulin aspart U-100  6 Units Subcutaneous Q24H    insulin aspart U-100  6 Units Subcutaneous Q24H    insulin aspart U-100  6 Units Subcutaneous Q24H    insulin aspart U-100  6 Units Subcutaneous Q24H    insulin aspart U-100  6 Units Subcutaneous Q24H    lidocaine  1 patch Transdermal Q24H    meropenem (MERREM) IVPB  2 g Intravenous Q8H    metoclopramide HCl  10 mg Oral TID AC    pantoprozole (PROTONIX) IV  40 mg Intravenous Q24H    psyllium husk (aspartame)  1 packet Oral Daily    scopolamine  1 patch Transdermal Q3 Days    sodium chloride 0.9%  10 mL Intravenous Q6H     PRN Meds:   sodium chloride    calcium carbonate    dextrose 10 % in water (D10W)    dextrose 50%    glucagon (human recombinant)    HYDROmorphone    HYDROmorphone    insulin aspart U-100    iohexol    naloxone    ondansetron    ondansetron    oxyCODONE    oxyCODONE    promethazine  (PHENERGAN) IVPB    sodium chloride 0.9%    sodium chloride 0.9%    traMADoL        Objective:     Vital Signs (Most Recent):  Temp: 98.1 °F (36.7 °C) (08/18/20 0435)  Pulse: 80 (08/18/20 0435)  Resp: 18 (08/18/20 0446)  BP: 117/65 (08/18/20 0435)  SpO2: 96 % (08/18/20 0435) Vital Signs (24h Range):  Temp:  [97.3 °F (36.3 °C)-98.1 °F (36.7 °C)] 98.1 °F (36.7 °C)  Pulse:  [78-83] 80  Resp:  [12-20] 18  SpO2:  [95 %-98 %] 96 %  BP: (117-165)/(65-88) 117/65     Intake/Output - Last 3 Shifts       08/16 0700 - 08/17 0659 08/17 0700 - 08/18 0659 08/18 0700 - 08/19 0659    P.O. 1830      I.V. (mL/kg) 20 (0.3)      IV Piggyback 300 300     TPN 1288.1 289.3 590.8    Total Intake(mL/kg) 3438.1 (48.6) 589.3 (8.3) 590.8 (8.4)    Urine (mL/kg/hr) 2650 (1.6) 2350 (1.4)     Drains 0 20     Other 0      Stool 1525 325     Total Output 4175 2695     Net -736.9 -2105.7 +590.8           Stool Occurrence 0 x 0 x           Physical Exam  Constitutional:       General: She is not in acute distress.     Appearance: Normal appearance.   HENT:      Head: Normocephalic and atraumatic.      Right Ear: External ear normal.      Left Ear: External ear normal.      Nose: Nose normal.      Mouth/Throat:      Mouth: Mucous membranes are moist.   Eyes:      Pupils: Pupils are equal, round, and reactive to light.   Neck:      Musculoskeletal: Normal range of motion.   Cardiovascular:      Rate and Rhythm: Normal rate.   Pulmonary:      Effort: Pulmonary effort is normal. No respiratory distress.      Comments: JOSEPH, no supplemental oxygen req  Abdominal:      General: There is no distension.      Palpations: Abdomen is soft.      Comments: Ostomy pink and patent, green bowel contents and gas in bag. Midline incision packed with gauze, no purulence. Two left abdomen IR drains with serosanguinous in bulb   Musculoskeletal: Normal range of motion.   Skin:     General: Skin is warm and dry.   Neurological:      General: No focal deficit present.       Mental Status: She is alert and oriented to person, place, and time.   Psychiatric:         Mood and Affect: Mood normal.         Behavior: Behavior normal.         Significant Labs:  CBC (Last 3 Results):   Recent Labs   Lab 08/16/20 0621 08/17/20 0618 08/18/20  0420   WBC 11.77 10.91 9.82   RBC 3.13* 3.26* 3.11*   HGB 8.0* 8.1* 7.8*   HCT 25.6* 27.1* 25.3*   * 850* 811*   MCV 82 83 81*   MCH 25.6* 24.8* 25.1*   MCHC 31.3* 29.9* 30.8*     CMP (Last 3 Results):   Recent Labs   Lab 08/16/20 0621 08/17/20 0618 08/18/20  0420   * 119* 77   CALCIUM 9.4 10.0 8.9   ALBUMIN 1.7* 1.7* 1.8*   PROT 8.4 7.8 8.0   * 133* 132*   K 4.4 4.4 4.1   CO2 23 23 26    100 98   BUN 17 20 24*   CREATININE 0.9 0.8 0.8   ALKPHOS 245* 213* 209*   ALT 20 18 17   AST 20 22 30   BILITOT 0.2 0.2 0.2     CRP (Last 3 Results):   Recent Labs   Lab 08/16/20 0621 08/17/20 0618 08/18/20  0420   .5* 110.2* 99.2*       Significant Diagnostics:  None new

## 2020-08-18 NOTE — SUBJECTIVE & OBJECTIVE
"Interval HPI:   Overnight events: NAEON. BG at or below goal ranges on current SQ insulin regimen. On diabetic diet.   Eating:   <25%  Nausea: No  Hypoglycemia and intervention: No  Fever: No  TPN and/or TF: Yes  If yes, type of TF/TPN and rate: TPN at 75 cc/hr     /65 (BP Location: Right arm, Patient Position: Lying)   Pulse 80   Temp 98.1 °F (36.7 °C) (Oral)   Resp 18   Ht 5' 1" (1.549 m)   Wt 70.7 kg (155 lb 13.8 oz)   LMP  (LMP Unknown)   SpO2 96%   Breastfeeding No   BMI 29.45 kg/m²     Labs Reviewed and Include    Recent Labs   Lab 08/18/20  0420   GLU 77   CALCIUM 8.9   ALBUMIN 1.8*   PROT 8.0   *   K 4.1   CO2 26   CL 98   BUN 24*   CREATININE 0.8   ALKPHOS 209*   ALT 17   AST 30   BILITOT 0.2     Lab Results   Component Value Date    WBC 9.82 08/18/2020    HGB 7.8 (L) 08/18/2020    HCT 25.3 (L) 08/18/2020    MCV 81 (L) 08/18/2020     (H) 08/18/2020     No results for input(s): TSH, FREET4 in the last 168 hours.  Lab Results   Component Value Date    HGBA1C 6.7 (H) 07/09/2020       Nutritional status:   Body mass index is 29.45 kg/m².  Lab Results   Component Value Date    ALBUMIN 1.8 (L) 08/18/2020    ALBUMIN 1.7 (L) 08/17/2020    ALBUMIN 1.7 (L) 08/16/2020     Lab Results   Component Value Date    PREALBUMIN 21 07/15/2020       Estimated Creatinine Clearance: 59.7 mL/min (based on SCr of 0.8 mg/dL).    Accu-Checks  Recent Labs     08/16/20  0745 08/16/20  1201 08/16/20  1735 08/16/20 2003 08/17/20  0021 08/17/20  0421 08/17/20  1219 08/17/20 2030 08/18/20  0038 08/18/20  0440   POCTGLUCOSE 187* 140* 197* 119* 142* 173* 184* 171* 164* 72       Current Medications and/or Treatments Impacting Glycemic Control  Immunotherapy:    Immunosuppressants     None        Steroids:   Hormones (From admission, onward)    None        Pressors:    Autonomic Drugs (From admission, onward)    None        Hyperglycemia/Diabetes Medications:   Antihyperglycemics (From admission, onward)    Start  "    Stop Route Frequency Ordered    08/15/20 1200  insulin aspart U-100 pen 8 Units      -- SubQ Every 24 hours (non-standard times) 08/14/20 1308    08/15/20 0800  insulin aspart U-100 pen 8 Units      -- SubQ Every 24 hours (non-standard times) 08/14/20 1308    08/15/20 0400  insulin aspart U-100 pen 8 Units      -- SubQ Every 24 hours (non-standard times) 08/14/20 1308    08/15/20 0000  insulin aspart U-100 pen 8 Units      -- SubQ Every 24 hours (non-standard times) 08/14/20 1308    08/14/20 2000  insulin aspart U-100 pen 8 Units      -- SubQ Every 24 hours (non-standard times) 08/14/20 1308    08/14/20 1600  insulin aspart U-100 pen 8 Units      -- SubQ Every 24 hours (non-standard times) 08/14/20 1308    08/14/20 1409  insulin aspart U-100 pen 0-5 Units      -- SubQ Every 4 hours PRN 08/14/20 1309

## 2020-08-18 NOTE — PLAN OF CARE
Patient alert and oriented.  Able to make needs known in French and English.  Comprehends her plan of care.  Wound vac was replaced this AM and steady.  Went to IR this evening and the vac had a leak upon return.  IR placed a 2nd drain above the 1st drain. Attempted to reseal but unsuccessful.  Wet to dry dressing as per wound care.  Tolerated the change.  Contact Precautions maintained.  Pain meds given as ordered.  With short relief.  Up to the commode during the shift.  Will continue to monitor.

## 2020-08-18 NOTE — PROGRESS NOTES
Wound/Ostomy follow up for RLQ ileostomy and to check wound vac seal.    Initially saw the pt at 11:03 am with pouch intact to RLQ ileostomy with wet to dry dressing noted to mildline abdominal incision. Pt's nurse reports that she was unable to maintain the wound vac last evening and that she had to remove it and apply a wet to dry dressing. Nurse also reports that the pt also had the staples to her abdominal wound removed.     LDA for wound vac removed. Secure chat sent to Dr. Valiente and Dr. Turner to confirm if they would like the wound vac reapplied. Okay with Dr. Valiente for wound vac application.    Received at 15:45 lying in bed with eyes closed with respirations even and unlabored. Awakens easily when spoken to. Assisted pt to bedside commode and emptied 200 ml of brown liquid stool from ostomy pouch. Left pouch intact at this time.    (see assessment below)    To abdominal midline: wound vac reapplied as directed with black foam applied to full thickness wound to superior portion of abdominal midline measuring 4 cm x 1.5 cm x 2 cm. Restore (silver non adherent) and black foam applied to full thickness wound to the inferior abdominal midline wound measuring 6 cm x 4 cm x 2 cm. Gustavo ring applied to inferior peristomal wound to help ensure seal. New cannister placed on vac system. Good seal intact with wound vac running at 125mmHg continuous setting. Pt tolerated well.    Additional convexity supplies left at bedside. Will continue to follow pt for wound vac dressing changes and ileostomy management. [id number]               08/18/20 1603        Colostomy 07/31/20 1943 Loop RLQ   Placement Date/Time: 07/31/20 1943   Present Prior to Hospital Arrival?: No  Inserted by: MD  Colostomy Type: Loop  Location: RLQ   Stomal Appliance 1 piece;Intact   Stoma Appearance round;rosebud appearance   Site Assessment NAKUL   Peristomal Assessment NAKUL   Accessories/Skin Care appliance belt   Stoma Function stool;brown;thick  liquid   Tolerance no signs/symptoms of discomfort   Output (mL) 200 mL

## 2020-08-18 NOTE — PROGRESS NOTES
"[facility]  Endocrinology  Progress Note    Admit Date: 7/15/2020     Reason for Consult: Management of T2DM, Hyperglycemia     Surgical Procedure and Date: n/a    Lab Results   Component Value Date    HGBA1C 6.7 (H) 07/09/2020     Diabetes diagnosis year: 2015    Home Diabetes Medications:  Lantus 10 units daily if am BG >180 and Metformin 1g BID    How often checking glucose at home? once daily   BG readings on regimen: 150-low 200s  Hypoglycemia on the regimen?  No  Missed doses on regimen?  No    Diabetes Complications include:     Hyperglycemia    Complicating diabetes co morbidities:   CKD      HPI:   Patient is a 70 y.o. female with a diagnosis of T2DM, HTN, and complicated surgical history, including h/o colon resection and Aiyana procedure, takedown colostomy, colostomy closure, clot of colo cutaneous fistula. She presnts with abdominal pain and vomiting. 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 CT scan from OSH showed partially obstructed small bowel. She was admitted to [facility]. Endocrinology consulted for management of T2DM. Of note, patient's primary language is Eritrean.  An  was used for this consult.       Interval HPI:   Overnight events: NAEON. BG at or below goal ranges on current SQ insulin regimen. On diabetic diet.   Eating:   <25%  Nausea: No  Hypoglycemia and intervention: No  Fever: No  TPN and/or TF: Yes  If yes, type of TF/TPN and rate: TPN at 75 cc/hr     /65 (BP Location: Right arm, Patient Position: Lying)   Pulse 80   Temp 98.1 °F (36.7 °C) (Oral)   Resp 18   Ht 5' 1" (1.549 m)   Wt 70.7 kg (155 lb 13.8 oz)   LMP  (LMP Unknown)   SpO2 96%   Breastfeeding No   BMI 29.45 kg/m²     Labs Reviewed and Include    Recent Labs   Lab 08/18/20  0420   GLU 77   CALCIUM 8.9   ALBUMIN 1.8*   PROT 8.0   *   K 4.1   CO2 26   CL 98   BUN 24*   CREATININE 0.8   ALKPHOS 209*   ALT 17   AST 30   BILITOT " 0.2     Lab Results   Component Value Date    WBC 9.82 08/18/2020    HGB 7.8 (L) 08/18/2020    HCT 25.3 (L) 08/18/2020    MCV 81 (L) 08/18/2020     (H) 08/18/2020     No results for input(s): TSH, FREET4 in the last 168 hours.  Lab Results   Component Value Date    HGBA1C 6.7 (H) 07/09/2020       Nutritional status:   Body mass index is 29.45 kg/m².  Lab Results   Component Value Date    ALBUMIN 1.8 (L) 08/18/2020    ALBUMIN 1.7 (L) 08/17/2020    ALBUMIN 1.7 (L) 08/16/2020     Lab Results   Component Value Date    PREALBUMIN 21 07/15/2020       Estimated Creatinine Clearance: 59.7 mL/min (based on SCr of 0.8 mg/dL).    Accu-Checks  Recent Labs     08/16/20  0745 08/16/20  1201 08/16/20  1735 08/16/20 2003 08/17/20  0021 08/17/20  0421 08/17/20  1219 08/17/20 2030 08/18/20  0038 08/18/20  0440   POCTGLUCOSE 187* 140* 197* 119* 142* 173* 184* 171* 164* 72       Current Medications and/or Treatments Impacting Glycemic Control  Immunotherapy:    Immunosuppressants     None        Steroids:   Hormones (From admission, onward)    None        Pressors:    Autonomic Drugs (From admission, onward)    None        Hyperglycemia/Diabetes Medications:   Antihyperglycemics (From admission, onward)    Start     Stop Route Frequency Ordered    08/15/20 1200  insulin aspart U-100 pen 8 Units      -- SubQ Every 24 hours (non-standard times) 08/14/20 1308    08/15/20 0800  insulin aspart U-100 pen 8 Units      -- SubQ Every 24 hours (non-standard times) 08/14/20 1308    08/15/20 0400  insulin aspart U-100 pen 8 Units      -- SubQ Every 24 hours (non-standard times) 08/14/20 1308    08/15/20 0000  insulin aspart U-100 pen 8 Units      -- SubQ Every 24 hours (non-standard times) 08/14/20 1308    08/14/20 2000  insulin aspart U-100 pen 8 Units      -- SubQ Every 24 hours (non-standard times) 08/14/20 1308    08/14/20 1600  insulin aspart U-100 pen 8 Units      -- SubQ Every 24 hours (non-standard times) 08/14/20 1308    08/14/20  1409  insulin aspart U-100 pen 0-5 Units      -- SubQ Every 4 hours PRN 08/14/20 1309          ASSESSMENT and PLAN    * Cutaneous fistula  Managed per primary team  Optimize BG control to improve wound healing        Type 2 diabetes mellitus without complication, with long-term current use of insulin  BG goal 140-180    - Decrease Novolog to 6 units q 4 hrs while on TPN. HOLD if TPN is stopped for any reason. (20% dose decrease)   - Low Dose Correction Scale PRN BG excursions.   - BG monitoring q 4 hrs while on TPN (coordinate with meals during the day)     ** Please call Endocrine for any BG related issues **  ** Please call Endocrine if TPN is restarted or diet changes **    Discharge plans:  TBD. Please notify endocrinology prior to discharge.       Chronic kidney disease, stage 3  Titrate insulin slowly  Avoid insulin stacking      Essential hypertension  Managed per primary team  Condition may cause insulin resistance             Va Genao NP  Endocrinology  [facility]

## 2020-08-19 LAB
ALBUMIN SERPL BCP-MCNC: 1.8 G/DL (ref 3.5–5.2)
ALP SERPL-CCNC: 200 U/L (ref 55–135)
ALT SERPL W/O P-5'-P-CCNC: 15 U/L (ref 10–44)
ANION GAP SERPL CALC-SCNC: 10 MMOL/L (ref 8–16)
AST SERPL-CCNC: 22 U/L (ref 10–40)
BASOPHILS # BLD AUTO: 0.05 K/UL (ref 0–0.2)
BASOPHILS NFR BLD: 0.6 % (ref 0–1.9)
BILIRUB SERPL-MCNC: 0.2 MG/DL (ref 0.1–1)
BUN SERPL-MCNC: 24 MG/DL (ref 8–23)
CALCIUM SERPL-MCNC: 9.2 MG/DL (ref 8.7–10.5)
CHLORIDE SERPL-SCNC: 105 MMOL/L (ref 95–110)
CO2 SERPL-SCNC: 23 MMOL/L (ref 23–29)
CREAT SERPL-MCNC: 0.9 MG/DL (ref 0.5–1.4)
CRP SERPL-MCNC: 116.3 MG/L (ref 0–8.2)
DIFFERENTIAL METHOD: ABNORMAL
EOSINOPHIL # BLD AUTO: 0.3 K/UL (ref 0–0.5)
EOSINOPHIL NFR BLD: 3.8 % (ref 0–8)
ERYTHROCYTE [DISTWIDTH] IN BLOOD BY AUTOMATED COUNT: 17.2 % (ref 11.5–14.5)
EST. GFR  (AFRICAN AMERICAN): >60 ML/MIN/1.73 M^2
EST. GFR  (NON AFRICAN AMERICAN): >60 ML/MIN/1.73 M^2
GLUCOSE SERPL-MCNC: 151 MG/DL (ref 70–110)
HCT VFR BLD AUTO: 27.6 % (ref 37–48.5)
HGB BLD-MCNC: 8.6 G/DL (ref 12–16)
IMM GRANULOCYTES # BLD AUTO: 0.08 K/UL (ref 0–0.04)
IMM GRANULOCYTES NFR BLD AUTO: 0.9 % (ref 0–0.5)
LYMPHOCYTES # BLD AUTO: 1.4 K/UL (ref 1–4.8)
LYMPHOCYTES NFR BLD: 15.1 % (ref 18–48)
MAGNESIUM SERPL-MCNC: 2 MG/DL (ref 1.6–2.6)
MAGNESIUM SERPL-MCNC: 2 MG/DL (ref 1.6–2.6)
MCH RBC QN AUTO: 25.4 PG (ref 27–31)
MCHC RBC AUTO-ENTMCNC: 31.2 G/DL (ref 32–36)
MCV RBC AUTO: 82 FL (ref 82–98)
MONOCYTES # BLD AUTO: 0.6 K/UL (ref 0.3–1)
MONOCYTES NFR BLD: 6.1 % (ref 4–15)
NEUTROPHILS # BLD AUTO: 6.6 K/UL (ref 1.8–7.7)
NEUTROPHILS NFR BLD: 73.5 % (ref 38–73)
NRBC BLD-RTO: 0 /100 WBC
PHOSPHATE SERPL-MCNC: 2.9 MG/DL (ref 2.7–4.5)
PHOSPHATE SERPL-MCNC: 2.9 MG/DL (ref 2.7–4.5)
PLATELET # BLD AUTO: 798 K/UL (ref 150–350)
PMV BLD AUTO: 8.3 FL (ref 9.2–12.9)
POCT GLUCOSE: 120 MG/DL (ref 70–110)
POCT GLUCOSE: 148 MG/DL (ref 70–110)
POCT GLUCOSE: 152 MG/DL (ref 70–110)
POCT GLUCOSE: 168 MG/DL (ref 70–110)
POCT GLUCOSE: 192 MG/DL (ref 70–110)
POCT GLUCOSE: 238 MG/DL (ref 70–110)
POTASSIUM SERPL-SCNC: 4.4 MMOL/L (ref 3.5–5.1)
PROT SERPL-MCNC: 8.2 G/DL (ref 6–8.4)
RBC # BLD AUTO: 3.38 M/UL (ref 4–5.4)
SODIUM SERPL-SCNC: 138 MMOL/L (ref 136–145)
WBC # BLD AUTO: 8.96 K/UL (ref 3.9–12.7)

## 2020-08-19 PROCEDURE — 63600175 PHARM REV CODE 636 W HCPCS: Performed by: STUDENT IN AN ORGANIZED HEALTH CARE EDUCATION/TRAINING PROGRAM

## 2020-08-19 PROCEDURE — 85025 COMPLETE CBC W/AUTO DIFF WBC: CPT

## 2020-08-19 PROCEDURE — 84100 ASSAY OF PHOSPHORUS: CPT

## 2020-08-19 PROCEDURE — 97803 MED NUTRITION INDIV SUBSEQ: CPT

## 2020-08-19 PROCEDURE — A4216 STERILE WATER/SALINE, 10 ML: HCPCS | Performed by: STUDENT IN AN ORGANIZED HEALTH CARE EDUCATION/TRAINING PROGRAM

## 2020-08-19 PROCEDURE — 86140 C-REACTIVE PROTEIN: CPT

## 2020-08-19 PROCEDURE — 25000003 PHARM REV CODE 250: Performed by: STUDENT IN AN ORGANIZED HEALTH CARE EDUCATION/TRAINING PROGRAM

## 2020-08-19 PROCEDURE — 25000003 PHARM REV CODE 250: Performed by: PHYSICIAN ASSISTANT

## 2020-08-19 PROCEDURE — 36415 COLL VENOUS BLD VENIPUNCTURE: CPT

## 2020-08-19 PROCEDURE — B4185 PARENTERAL SOL 10 GM LIPIDS: HCPCS | Performed by: STUDENT IN AN ORGANIZED HEALTH CARE EDUCATION/TRAINING PROGRAM

## 2020-08-19 PROCEDURE — 63600175 PHARM REV CODE 636 W HCPCS: Performed by: PHYSICIAN ASSISTANT

## 2020-08-19 PROCEDURE — 80053 COMPREHEN METABOLIC PANEL: CPT

## 2020-08-19 PROCEDURE — 20600001 HC STEP DOWN PRIVATE ROOM

## 2020-08-19 PROCEDURE — 99232 SBSQ HOSP IP/OBS MODERATE 35: CPT | Mod: ,,, | Performed by: NURSE PRACTITIONER

## 2020-08-19 PROCEDURE — C9113 INJ PANTOPRAZOLE SODIUM, VIA: HCPCS | Performed by: STUDENT IN AN ORGANIZED HEALTH CARE EDUCATION/TRAINING PROGRAM

## 2020-08-19 PROCEDURE — 25000242 PHARM REV CODE 250 ALT 637 W/ HCPCS: Performed by: COLON & RECTAL SURGERY

## 2020-08-19 PROCEDURE — 83735 ASSAY OF MAGNESIUM: CPT

## 2020-08-19 PROCEDURE — 63600175 PHARM REV CODE 636 W HCPCS: Performed by: NURSE PRACTITIONER

## 2020-08-19 PROCEDURE — A4217 STERILE WATER/SALINE, 500 ML: HCPCS | Performed by: STUDENT IN AN ORGANIZED HEALTH CARE EDUCATION/TRAINING PROGRAM

## 2020-08-19 PROCEDURE — 99232 PR SUBSEQUENT HOSPITAL CARE,LEVL II: ICD-10-PCS | Mod: ,,, | Performed by: NURSE PRACTITIONER

## 2020-08-19 RX ORDER — LOPERAMIDE HYDROCHLORIDE 2 MG/1
2 CAPSULE ORAL 2 TIMES DAILY
Status: DISCONTINUED | OUTPATIENT
Start: 2020-08-19 | End: 2020-08-20

## 2020-08-19 RX ADMIN — Medication 10 ML: at 12:08

## 2020-08-19 RX ADMIN — MEROPENEM 2 G: 1 INJECTION, POWDER, FOR SOLUTION INTRAVENOUS at 10:08

## 2020-08-19 RX ADMIN — IBUPROFEN 400 MG: 400 TABLET, FILM COATED ORAL at 09:08

## 2020-08-19 RX ADMIN — ENOXAPARIN SODIUM 40 MG: 40 INJECTION SUBCUTANEOUS at 04:08

## 2020-08-19 RX ADMIN — HYDROMORPHONE HYDROCHLORIDE 1 MG: 1 INJECTION, SOLUTION INTRAMUSCULAR; INTRAVENOUS; SUBCUTANEOUS at 09:08

## 2020-08-19 RX ADMIN — INSULIN ASPART 6 UNITS: 100 INJECTION, SOLUTION INTRAVENOUS; SUBCUTANEOUS at 11:08

## 2020-08-19 RX ADMIN — ACETAMINOPHEN 1000 MG: 500 TABLET ORAL at 12:08

## 2020-08-19 RX ADMIN — IBUPROFEN 400 MG: 400 TABLET, FILM COATED ORAL at 03:08

## 2020-08-19 RX ADMIN — HYDROMORPHONE HYDROCHLORIDE 1 MG: 1 INJECTION, SOLUTION INTRAMUSCULAR; INTRAVENOUS; SUBCUTANEOUS at 05:08

## 2020-08-19 RX ADMIN — Medication 10 ML: at 11:08

## 2020-08-19 RX ADMIN — GABAPENTIN 300 MG: 300 CAPSULE ORAL at 03:08

## 2020-08-19 RX ADMIN — GABAPENTIN 300 MG: 300 CAPSULE ORAL at 08:08

## 2020-08-19 RX ADMIN — SCOPALAMINE 1 PATCH: 1 PATCH, EXTENDED RELEASE TRANSDERMAL at 09:08

## 2020-08-19 RX ADMIN — LIDOCAINE 1 PATCH: 50 PATCH TOPICAL at 05:08

## 2020-08-19 RX ADMIN — METOCLOPRAMIDE 10 MG: 5 TABLET ORAL at 03:08

## 2020-08-19 RX ADMIN — METOCLOPRAMIDE 10 MG: 5 TABLET ORAL at 11:08

## 2020-08-19 RX ADMIN — HYDROMORPHONE HYDROCHLORIDE 1 MG: 1 INJECTION, SOLUTION INTRAMUSCULAR; INTRAVENOUS; SUBCUTANEOUS at 01:08

## 2020-08-19 RX ADMIN — Medication 10 ML: at 01:08

## 2020-08-19 RX ADMIN — MEROPENEM 2 G: 1 INJECTION, POWDER, FOR SOLUTION INTRAVENOUS at 03:08

## 2020-08-19 RX ADMIN — IBUPROFEN 400 MG: 400 TABLET, FILM COATED ORAL at 10:08

## 2020-08-19 RX ADMIN — ACETAMINOPHEN 1000 MG: 500 TABLET ORAL at 07:08

## 2020-08-19 RX ADMIN — Medication 10 ML: at 05:08

## 2020-08-19 RX ADMIN — ACETAMINOPHEN 1000 MG: 500 TABLET ORAL at 11:08

## 2020-08-19 RX ADMIN — MAGNESIUM SULFATE HEPTAHYDRATE: 500 INJECTION, SOLUTION INTRAMUSCULAR; INTRAVENOUS at 10:08

## 2020-08-19 RX ADMIN — LOPERAMIDE HYDROCHLORIDE 2 MG: 2 CAPSULE ORAL at 08:08

## 2020-08-19 RX ADMIN — HYDROCHLOROTHIAZIDE 25 MG: 25 TABLET ORAL at 09:08

## 2020-08-19 RX ADMIN — HYDROMORPHONE HYDROCHLORIDE 1 MG: 1 INJECTION, SOLUTION INTRAMUSCULAR; INTRAVENOUS; SUBCUTANEOUS at 04:08

## 2020-08-19 RX ADMIN — LOPERAMIDE HYDROCHLORIDE 2 MG: 2 CAPSULE ORAL at 09:08

## 2020-08-19 RX ADMIN — INSULIN ASPART 6 UNITS: 100 INJECTION, SOLUTION INTRAVENOUS; SUBCUTANEOUS at 04:08

## 2020-08-19 RX ADMIN — CARVEDILOL 12.5 MG: 12.5 TABLET, FILM COATED ORAL at 09:08

## 2020-08-19 RX ADMIN — CARVEDILOL 12.5 MG: 12.5 TABLET, FILM COATED ORAL at 08:08

## 2020-08-19 RX ADMIN — INSULIN ASPART 2 UNITS: 100 INJECTION, SOLUTION INTRAVENOUS; SUBCUTANEOUS at 07:08

## 2020-08-19 RX ADMIN — Medication 10 ML: at 06:08

## 2020-08-19 RX ADMIN — PANTOPRAZOLE SODIUM 40 MG: 40 INJECTION, POWDER, LYOPHILIZED, FOR SOLUTION INTRAVENOUS at 09:08

## 2020-08-19 RX ADMIN — INSULIN ASPART 6 UNITS: 100 INJECTION, SOLUTION INTRAVENOUS; SUBCUTANEOUS at 08:08

## 2020-08-19 RX ADMIN — I.V. FAT EMULSION 250 ML: 20 EMULSION INTRAVENOUS at 10:08

## 2020-08-19 RX ADMIN — PSYLLIUM HUSK 1 PACKET: 3.4 POWDER ORAL at 09:08

## 2020-08-19 RX ADMIN — INSULIN ASPART 6 UNITS: 100 INJECTION, SOLUTION INTRAVENOUS; SUBCUTANEOUS at 09:08

## 2020-08-19 RX ADMIN — OXYCODONE HYDROCHLORIDE 10 MG: 10 TABLET ORAL at 08:08

## 2020-08-19 RX ADMIN — METOCLOPRAMIDE 10 MG: 5 TABLET ORAL at 07:08

## 2020-08-19 RX ADMIN — GABAPENTIN 300 MG: 300 CAPSULE ORAL at 09:08

## 2020-08-19 NOTE — PLAN OF CARE
Patient alert and oriented. Able to make needs known in Polish and English.  Comprehends her plan of care.  Wet to dry dressing was placed last night.  Staples were removed this am by the MD.  Wound vac placed back at the mid abdomen.  Both IR drains in place, one with output.  Uses the bedside commode.  PICC line in place patent and intact. Contact isolation maintained.  Will continue to monitor.

## 2020-08-19 NOTE — PROGRESS NOTES
[facility]  Colorectal Surgery  Progress Note    Patient Name: Azucena Morrison  MRN: [id number]  Admission Date: 7/15/2020  Hospital Length of Stay: 35 days  Attending Physician: Fabiana Valiente MD    Subjective:     Interval History: NAEON. Patient states that she is tolerating diet better each day. She states that she feels better this morning. She denies n/v.     Post-Op Info:  Procedure(s) (LRB):  RESECTION, COLON, LOW ANTERIOR (N/A)  CYSTOSCOPY (N/A)  CATHETERIZATION, URETER (Bilateral)  CREATION, ILEOSTOMY  LYSIS, ADHESIONS   19 Days Post-Op      Medications:  Continuous Infusions:   dextrose 10 % in water (D10W)      TPN ADULT CENTRAL LINE CUSTOM 60 mL/hr at 08/19/20 0700    TPN ADULT CENTRAL LINE CUSTOM       Scheduled Meds:   acetaminophen  1,000 mg Oral Q6H    carvediloL  12.5 mg Oral BID    enoxaparin  40 mg Subcutaneous Q24H    fat emulsion 20%  250 mL Intravenous Daily    gabapentin  300 mg Oral TID    hydroCHLOROthiazide  25 mg Oral Daily    ibuprofen  400 mg Oral TID    insulin aspart U-100  6 Units Subcutaneous Q24H    insulin aspart U-100  6 Units Subcutaneous Q24H    insulin aspart U-100  6 Units Subcutaneous Q24H    insulin aspart U-100  6 Units Subcutaneous Q24H    insulin aspart U-100  6 Units Subcutaneous Q24H    insulin aspart U-100  6 Units Subcutaneous Q24H    lidocaine  1 patch Transdermal Q24H    loperamide  2 mg Oral BID    meropenem (MERREM) IVPB  2 g Intravenous Q8H    metoclopramide HCl  10 mg Oral TID AC    pantoprozole (PROTONIX) IV  40 mg Intravenous Q24H    psyllium husk (aspartame)  1 packet Oral Daily    scopolamine  1 patch Transdermal Q3 Days    sodium chloride 0.9%  10 mL Intravenous Q6H     PRN Meds:   sodium chloride    calcium carbonate    dextrose 10 % in water (D10W)    dextrose 50%    glucagon (human recombinant)    HYDROmorphone    HYDROmorphone    insulin aspart U-100    iohexol    naloxone    ondansetron     ondansetron    oxyCODONE    oxyCODONE    promethazine (PHENERGAN) IVPB    sodium chloride 0.9%    sodium chloride 0.9%    traMADoL        Objective:     Vital Signs (Most Recent):  Temp: 98.2 °F (36.8 °C) (08/19/20 1210)  Pulse: 84 (08/19/20 1210)  Resp: 18 (08/19/20 1210)  BP: 131/75 (08/19/20 1210)  SpO2: 95 % (08/19/20 1210) Vital Signs (24h Range):  Temp:  [97.8 °F (36.6 °C)-98.9 °F (37.2 °C)] 98.2 °F (36.8 °C)  Pulse:  [76-91] 84  Resp:  [16-20] 18  SpO2:  [94 %-97 %] 95 %  BP: (120-144)/(63-80) 131/75     Intake/Output - Last 3 Shifts       08/17 0700 - 08/18 0659 08/18 0700 - 08/19 0659 08/19 0700 - 08/20 0659    P.O.       I.V. (mL/kg)       IV Piggyback 300      .3 1010.8 60    Total Intake(mL/kg) 589.3 (8.3) 1010.8 (14.3) 60 (0.8)    Urine (mL/kg/hr) 2350 (1.4) 2750 (1.6) 1300 (3.6)    Drains 20      Other       Stool 325 1270 200    Total Output 2695 4020 1500    Net -2105.7 -3009.2 -1440           Urine Occurrence   2 x    Stool Occurrence 0 x            Physical Exam  Constitutional:       General: She is not in acute distress.     Appearance: Normal appearance.   HENT:      Head: Normocephalic and atraumatic.      Right Ear: External ear normal.      Left Ear: External ear normal.      Nose: Nose normal.      Mouth/Throat:      Mouth: Mucous membranes are moist.   Eyes:      Extraocular Movements: Extraocular movements intact.      Pupils: Pupils are equal, round, and reactive to light.   Neck:      Musculoskeletal: Normal range of motion.   Cardiovascular:      Rate and Rhythm: Normal rate.   Pulmonary:      Effort: Pulmonary effort is normal.      Comments: JOSEPH  Abdominal:      Palpations: Abdomen is soft.      Comments: Midline wound vac in place, good suction, no leak. IR drains x2 in place in left anterior abdomen, both with SS output. Ostomy bag in place, bowel contents and gas in bag.   Musculoskeletal: Normal range of motion.   Skin:     General: Skin is warm and dry.    Neurological:      General: No focal deficit present.      Mental Status: She is alert and oriented to person, place, and time.   Psychiatric:         Mood and Affect: Mood normal.         Behavior: Behavior normal.       Significant Labs:  CRP (Last 3 Results):   Recent Labs   Lab 08/17/20  0618 08/18/20  0420 08/19/20  0421   .2* 99.2* 116.3*       Significant Diagnostics:  None    Assessment/Plan:     * Cutaneous fistula  70 y.o. female 19 Days Post-Op for Procedure(s) (LRB):  RESECTION, COLON, LOW ANTERIOR (N/A)  CYSTOSCOPY (N/A)  CATHETERIZATION, URETER (Bilateral)  CREATION, ILEOSTOMY  LYSIS, ADHESIONS   - IR drain placed 8/6: cultures growing Ecoli sensitive to Ertapenem (also has a UTI with the same Ecoli). Drain upsized  By IR on 8/12  - additional drain placed 8/17, will plan to remove initial IR drain today  - IV antibiotics switched to meropenem by ID and patient placed on contact precautions due to ESBL. Appreciate ID recommendations, continue therapy per recommendations  - Continue diabetic diet as tolerated  - continue nausea control w scopolamine patch  - Continue TPN   - SCDs/DVT prophylaxis  - GI prophylaxis  - Encourage IS  - PT/OT. Encourage to chair and ambulation.  - f/u with social work regarding potential discharge Friday  - imodium ordered for loose bowels      Continue communication with use of       On total parenteral nutrition (TPN)  Reorder.    UTI (urinary tract infection)  Cultures growing E. Coli sensitive to ertapenem. First dose given 8/7. Infectious diseases consulted. Switched to meropenem on 8/13 by ID for ESBL E. Coli. Anticipated long course IV antibiotic treatment. Appreciate recommendations.     Acute blood loss anemia  H/H has remained stable, f/u morning labs      Chronic kidney disease, stage 3  Trend BUN/Cr  Monitor UOP    Generalized abdominal pain  Continue standing tylenol, ibuprofen, gabapentin  PCA discontinued  PO oxycodone PRN for pain  control    CT w/ interval increase in size in both midline and right sided anterior abdominal fluid collections, patient remaining NPO for IR drain placement today  IR consulted    Essential hypertension  Home medications resumed  Can increase carvedilol as needed if BP still elevated    Type 2 diabetes mellitus without complication, with long-term current use of insulin  SSI  On scheduled insulin ordered by Endo team.  Accu check Q6    Endocrinology consulted. Appreciate their recommendations.           Alton Patino MD  Colorectal Surgery  [facility]

## 2020-08-19 NOTE — PLAN OF CARE
A&Ox4. VVS on RA. TPN @60 continuous, diabetic diet tolerated well. Wound vac in place, continuous @125. PENNY drain tolerated well with little output. Adequate UOP per bedside commode. Pain managed with PRN medications, nausea managed with PRN meds, denies vomiting. Independent ambulation in room. Contact precautions maintained. Q4 accuchecks maintained. Ileostomy tolerated well with good output. Bed in lowest position, call light in reach, WCTM.      Problem: Diabetes Comorbidity  Goal: Blood Glucose Level Within Desired Range  Outcome: Ongoing, Progressing     Problem: Wound  Goal: Optimal Wound Healing  Outcome: Ongoing, Progressing     Problem: Adult Inpatient Plan of Care  Goal: Plan of Care Review  Outcome: Ongoing, Progressing  Goal: Patient-Specific Goal (Individualization)  Outcome: Ongoing, Progressing  Goal: Absence of Hospital-Acquired Illness or Injury  Outcome: Ongoing, Progressing  Goal: Optimal Comfort and Wellbeing  Outcome: Ongoing, Progressing  Goal: Readiness for Transition of Care  Outcome: Ongoing, Progressing  Goal: Rounds/Family Conference  Outcome: Ongoing, Progressing     Problem: Diabetes Comorbidity  Goal: Blood Glucose Level Within Desired Range  Outcome: Ongoing, Progressing     Problem: Infection  Goal: Infection Symptom Resolution  Outcome: Ongoing, Progressing     Problem: Skin Injury Risk Increased  Goal: Skin Health and Integrity  Outcome: Ongoing, Progressing     Problem: Fall Injury Risk  Goal: Absence of Fall and Fall-Related Injury  Outcome: Ongoing, Progressing     Problem: Hypertension Comorbidity  Goal: Blood Pressure in Desired Range  Outcome: Ongoing, Progressing

## 2020-08-19 NOTE — SUBJECTIVE & OBJECTIVE
"Interval HPI:   Overnight events: NAEON. BG reasonably controlled on current SQ insulin regimen.   Eating:   <25%   Nausea: No  Hypoglycemia and intervention: No  Fever: No  TPN and/or TF: Yes  If yes, type of TF/TPN and rate: TPN at 60 cc/hr     /80 (BP Location: Right arm, Patient Position: Lying)   Pulse 91   Temp 98.2 °F (36.8 °C) (Oral)   Resp 19   Ht 5' 1" (1.549 m)   Wt 70.7 kg (155 lb 13.8 oz)   LMP  (LMP Unknown)   SpO2 (!) 94%   Breastfeeding No   BMI 29.45 kg/m²     Labs Reviewed and Include    No results for input(s): GLU, CALCIUM, ALBUMIN, PROT, NA, K, CO2, CL, BUN, CREATININE, ALKPHOS, ALT, AST, BILITOT in the last 24 hours.  Lab Results   Component Value Date    WBC 8.96 08/19/2020    HGB 8.6 (L) 08/19/2020    HCT 27.6 (L) 08/19/2020    MCV 82 08/19/2020     (H) 08/19/2020     No results for input(s): TSH, FREET4 in the last 168 hours.  Lab Results   Component Value Date    HGBA1C 6.7 (H) 07/09/2020       Nutritional status:   Body mass index is 29.45 kg/m².  Lab Results   Component Value Date    ALBUMIN 1.8 (L) 08/18/2020    ALBUMIN 1.7 (L) 08/17/2020    ALBUMIN 1.7 (L) 08/16/2020     Lab Results   Component Value Date    PREALBUMIN 21 07/15/2020       Estimated Creatinine Clearance: 59.7 mL/min (based on SCr of 0.8 mg/dL).    Accu-Checks  Recent Labs     08/17/20  1219 08/17/20  2030 08/18/20  0038 08/18/20  0440 08/18/20  0933 08/18/20  1247 08/18/20  2056 08/19/20  0057 08/19/20  0702 08/19/20  0901   POCTGLUCOSE 184* 171* 164* 72 171* 178* 189* 168* 238* 192*       Current Medications and/or Treatments Impacting Glycemic Control  Immunotherapy:    Immunosuppressants     None        Steroids:   Hormones (From admission, onward)    None        Pressors:    Autonomic Drugs (From admission, onward)    None        Hyperglycemia/Diabetes Medications:   Antihyperglycemics (From admission, onward)    Start     Stop Route Frequency Ordered    08/19/20 0400  insulin aspart U-100 pen 6 " Units      -- SubQ Every 24 hours (non-standard times) 08/18/20 0748    08/19/20 0000  insulin aspart U-100 pen 6 Units      -- SubQ Every 24 hours (non-standard times) 08/18/20 0748    08/18/20 2000  insulin aspart U-100 pen 6 Units      -- SubQ Every 24 hours (non-standard times) 08/18/20 0748    08/18/20 1600  insulin aspart U-100 pen 6 Units      -- SubQ Every 24 hours (non-standard times) 08/18/20 0748    08/18/20 1200  insulin aspart U-100 pen 6 Units      -- SubQ Every 24 hours (non-standard times) 08/18/20 0748    08/18/20 0800  insulin aspart U-100 pen 6 Units      -- SubQ Every 24 hours (non-standard times) 08/18/20 0748    08/14/20 1409  insulin aspart U-100 pen 0-5 Units      -- SubQ Every 4 hours PRN 08/14/20 1308

## 2020-08-19 NOTE — PROGRESS NOTES
"[facility]  Endocrinology  Progress Note    Admit Date: 7/15/2020     Reason for Consult: Management of T2DM, Hyperglycemia     Surgical Procedure and Date: n/a    Lab Results   Component Value Date    HGBA1C 6.7 (H) 07/09/2020     Diabetes diagnosis year: 2015    Home Diabetes Medications:  Lantus 10 units daily if am BG >180 and Metformin 1g BID    How often checking glucose at home? once daily   BG readings on regimen: 150-low 200s  Hypoglycemia on the regimen?  No  Missed doses on regimen?  No    Diabetes Complications include:     Hyperglycemia    Complicating diabetes co morbidities:   CKD      HPI:   Patient is a 70 y.o. female with a diagnosis of T2DM, HTN, and complicated surgical history, including h/o colon resection and Aiyana procedure, takedown colostomy, colostomy closure, clot of colo cutaneous fistula. She presnts with abdominal pain and vomiting. 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 CT scan from OSH showed partially obstructed small bowel. She was admitted to [facility]. Endocrinology consulted for management of T2DM. Of note, patient's primary language is Tongan.  An  was used for this consult.       Interval HPI:   Overnight events: NAEON. BG reasonably controlled on current SQ insulin regimen.   Eating:   <25%   Nausea: No  Hypoglycemia and intervention: No  Fever: No  TPN and/or TF: Yes  If yes, type of TF/TPN and rate: TPN at 60 cc/hr     /80 (BP Location: Right arm, Patient Position: Lying)   Pulse 91   Temp 98.2 °F (36.8 °C) (Oral)   Resp 19   Ht 5' 1" (1.549 m)   Wt 70.7 kg (155 lb 13.8 oz)   LMP  (LMP Unknown)   SpO2 (!) 94%   Breastfeeding No   BMI 29.45 kg/m²     Labs Reviewed and Include    No results for input(s): GLU, CALCIUM, ALBUMIN, PROT, NA, K, CO2, CL, BUN, CREATININE, ALKPHOS, ALT, AST, BILITOT in the last 24 hours.  Lab Results   Component Value Date    WBC 8.96 08/19/2020    HGB " 8.6 (L) 08/19/2020    HCT 27.6 (L) 08/19/2020    MCV 82 08/19/2020     (H) 08/19/2020     No results for input(s): TSH, FREET4 in the last 168 hours.  Lab Results   Component Value Date    HGBA1C 6.7 (H) 07/09/2020       Nutritional status:   Body mass index is 29.45 kg/m².  Lab Results   Component Value Date    ALBUMIN 1.8 (L) 08/18/2020    ALBUMIN 1.7 (L) 08/17/2020    ALBUMIN 1.7 (L) 08/16/2020     Lab Results   Component Value Date    PREALBUMIN 21 07/15/2020       Estimated Creatinine Clearance: 59.7 mL/min (based on SCr of 0.8 mg/dL).    Accu-Checks  Recent Labs     08/17/20  1219 08/17/20  2030 08/18/20  0038 08/18/20  0440 08/18/20  0933 08/18/20  1247 08/18/20  2056 08/19/20  0057 08/19/20  0702 08/19/20  0901   POCTGLUCOSE 184* 171* 164* 72 171* 178* 189* 168* 238* 192*       Current Medications and/or Treatments Impacting Glycemic Control  Immunotherapy:    Immunosuppressants     None        Steroids:   Hormones (From admission, onward)    None        Pressors:    Autonomic Drugs (From admission, onward)    None        Hyperglycemia/Diabetes Medications:   Antihyperglycemics (From admission, onward)    Start     Stop Route Frequency Ordered    08/19/20 0400  insulin aspart U-100 pen 6 Units      -- SubQ Every 24 hours (non-standard times) 08/18/20 0748    08/19/20 0000  insulin aspart U-100 pen 6 Units      -- SubQ Every 24 hours (non-standard times) 08/18/20 0748    08/18/20 2000  insulin aspart U-100 pen 6 Units      -- SubQ Every 24 hours (non-standard times) 08/18/20 0748    08/18/20 1600  insulin aspart U-100 pen 6 Units      -- SubQ Every 24 hours (non-standard times) 08/18/20 0748    08/18/20 1200  insulin aspart U-100 pen 6 Units      -- SubQ Every 24 hours (non-standard times) 08/18/20 0748    08/18/20 0800  insulin aspart U-100 pen 6 Units      -- SubQ Every 24 hours (non-standard times) 08/18/20 0748    08/14/20 1409  insulin aspart U-100 pen 0-5 Units      -- SubQ Every 4 hours PRN  08/14/20 1309          ASSESSMENT and PLAN    * Cutaneous fistula  Managed per primary team  Optimize BG control to improve wound healing        Type 2 diabetes mellitus without complication, with long-term current use of insulin  BG goal 140-180    - Continue Novolog 6 units q 4 hrs while on TPN. HOLD if TPN is stopped for any reason.  - Low Dose Correction Scale PRN BG excursions.   - BG monitoring q 4 hrs while on TPN (coordinate with meals during the day)     ** Please call Endocrine for any BG related issues **  ** Please call Endocrine if TPN is restarted or diet changes **    Discharge plans:  TBD. Please notify endocrinology prior to discharge.       Chronic kidney disease, stage 3  Titrate insulin slowly  Avoid insulin stacking      Essential hypertension  Managed per primary team  Condition may cause insulin resistance             Va Genao NP  Endocrinology  [facility]

## 2020-08-19 NOTE — SUBJECTIVE & OBJECTIVE
Subjective:     Interval History: NAEON. Patient states that she is tolerating diet better each day. She states that she feels better this morning. She denies n/v.     Post-Op Info:  Procedure(s) (LRB):  RESECTION, COLON, LOW ANTERIOR (N/A)  CYSTOSCOPY (N/A)  CATHETERIZATION, URETER (Bilateral)  CREATION, ILEOSTOMY  LYSIS, ADHESIONS   19 Days Post-Op      Medications:  Continuous Infusions:   dextrose 10 % in water (D10W)      TPN ADULT CENTRAL LINE CUSTOM 60 mL/hr at 08/19/20 0700    TPN ADULT CENTRAL LINE CUSTOM       Scheduled Meds:   acetaminophen  1,000 mg Oral Q6H    carvediloL  12.5 mg Oral BID    enoxaparin  40 mg Subcutaneous Q24H    fat emulsion 20%  250 mL Intravenous Daily    gabapentin  300 mg Oral TID    hydroCHLOROthiazide  25 mg Oral Daily    ibuprofen  400 mg Oral TID    insulin aspart U-100  6 Units Subcutaneous Q24H    insulin aspart U-100  6 Units Subcutaneous Q24H    insulin aspart U-100  6 Units Subcutaneous Q24H    insulin aspart U-100  6 Units Subcutaneous Q24H    insulin aspart U-100  6 Units Subcutaneous Q24H    insulin aspart U-100  6 Units Subcutaneous Q24H    lidocaine  1 patch Transdermal Q24H    loperamide  2 mg Oral BID    meropenem (MERREM) IVPB  2 g Intravenous Q8H    metoclopramide HCl  10 mg Oral TID AC    pantoprozole (PROTONIX) IV  40 mg Intravenous Q24H    psyllium husk (aspartame)  1 packet Oral Daily    scopolamine  1 patch Transdermal Q3 Days    sodium chloride 0.9%  10 mL Intravenous Q6H     PRN Meds:   sodium chloride    calcium carbonate    dextrose 10 % in water (D10W)    dextrose 50%    glucagon (human recombinant)    HYDROmorphone    HYDROmorphone    insulin aspart U-100    iohexol    naloxone    ondansetron    ondansetron    oxyCODONE    oxyCODONE    promethazine (PHENERGAN) IVPB    sodium chloride 0.9%    sodium chloride 0.9%    traMADoL        Objective:     Vital Signs (Most Recent):  Temp: 98.2 °F (36.8 °C) (08/19/20  1210)  Pulse: 84 (08/19/20 1210)  Resp: 18 (08/19/20 1210)  BP: 131/75 (08/19/20 1210)  SpO2: 95 % (08/19/20 1210) Vital Signs (24h Range):  Temp:  [97.8 °F (36.6 °C)-98.9 °F (37.2 °C)] 98.2 °F (36.8 °C)  Pulse:  [76-91] 84  Resp:  [16-20] 18  SpO2:  [94 %-97 %] 95 %  BP: (120-144)/(63-80) 131/75     Intake/Output - Last 3 Shifts       08/17 0700 - 08/18 0659 08/18 0700 - 08/19 0659 08/19 0700 - 08/20 0659    P.O.       I.V. (mL/kg)       IV Piggyback 300      .3 1010.8 60    Total Intake(mL/kg) 589.3 (8.3) 1010.8 (14.3) 60 (0.8)    Urine (mL/kg/hr) 2350 (1.4) 2750 (1.6) 1300 (3.6)    Drains 20      Other       Stool 325 1270 200    Total Output 2695 4020 1500    Net -2105.7 -3009.2 -1440           Urine Occurrence   2 x    Stool Occurrence 0 x            Physical Exam  Constitutional:       General: She is not in acute distress.     Appearance: Normal appearance.   HENT:      Head: Normocephalic and atraumatic.      Right Ear: External ear normal.      Left Ear: External ear normal.      Nose: Nose normal.      Mouth/Throat:      Mouth: Mucous membranes are moist.   Eyes:      Extraocular Movements: Extraocular movements intact.      Pupils: Pupils are equal, round, and reactive to light.   Neck:      Musculoskeletal: Normal range of motion.   Cardiovascular:      Rate and Rhythm: Normal rate.   Pulmonary:      Effort: Pulmonary effort is normal.      Comments: JOSEPH  Abdominal:      Palpations: Abdomen is soft.      Comments: Midline wound vac in place, good suction, no leak. IR drains x2 in place in left anterior abdomen, both with SS output. Ostomy bag in place, bowel contents and gas in bag.   Musculoskeletal: Normal range of motion.   Skin:     General: Skin is warm and dry.   Neurological:      General: No focal deficit present.      Mental Status: She is alert and oriented to person, place, and time.   Psychiatric:         Mood and Affect: Mood normal.         Behavior: Behavior normal.        Significant Labs:  CRP (Last 3 Results):   Recent Labs   Lab 08/17/20  0618 08/18/20  0420 08/19/20  0421   .2* 99.2* 116.3*       Significant Diagnostics:  None

## 2020-08-19 NOTE — ASSESSMENT & PLAN NOTE
70 y.o. female 19 Days Post-Op for Procedure(s) (LRB):  RESECTION, COLON, LOW ANTERIOR (N/A)  CYSTOSCOPY (N/A)  CATHETERIZATION, URETER (Bilateral)  CREATION, ILEOSTOMY  LYSIS, ADHESIONS   - IR drain placed 8/6: cultures growing Ecoli sensitive to Ertapenem (also has a UTI with the same Ecoli). Drain upsized  By IR on 8/12  - additional drain placed 8/17, will plan to remove initial IR drain today  - IV antibiotics switched to meropenem by ID and patient placed on contact precautions due to ESBL. Appreciate ID recommendations, continue therapy per recommendations  - Continue diabetic diet as tolerated  - continue nausea control w scopolamine patch  - Continue TPN   - SCDs/DVT prophylaxis  - GI prophylaxis  - Encourage IS  - PT/OT. Encourage to chair and ambulation.  - f/u with social work regarding potential discharge Friday  - imodium ordered for loose bowels      Continue communication with use of

## 2020-08-19 NOTE — CONSULTS
"  [facility]  [facility]ult [facility]  Consult Note    SUMMARY     Recommendations    Pt meets criteria for acute moderate malnutrition.      1. Continue diabetic diet + Boost Glucose Control (vanilla) with texture per SLP.    - Encourage po and ONS intake.     2. Continue current TPN - meeting needs.      3. Please obtain new wt for accuracy.     4. RD following.     Goals: pt to tolerate >85% of EEN/EPN by RD follow up  Nutrition Goal Status: goal met  Communication of RD Recs: (POC)    Reason for Assessment    Reason For Assessment: RD follow-up  Diagnosis: (cutaneous fistula)  Relevant Medical History: DM; HTN; CKD3  Interdisciplinary Rounds: did not attend  General Information Comments: S/p colon resection with ileostomy, now with wound vac. TPN infusing. Pt resting in bed with in family members at bedside. Pt reports no appetite. Per chart review, pt eating very little of meals. Pt states she drinks Boost ONS when it is vanilla. Noted several Boost chocolate at bedside. RD to change order in mydining. C/o nausea. RD encouraged po and ONS intake. Pt verbalized understanding and understands the importance. Discussed "Carbohyrate Counting for People with Diabetes Handout". Encouraged pt to eat non-starchy vegetables/lean protein and avoid sugary beverages. Pt with no questions or concerns. Noted no additional wt loss. NFPE completed 7/17, 8/19. Pt continues with mild-moderate muscle/fat wasting. Pt meets criteria for moderate malnutrition.   Nutrition Discharge Planning: Unable to determine    Nutrition Risk Screen    Nutrition Risk Screen: tube feeding or parenteral nutrition    Nutrition/Diet History    Patient Reported Diet/Restrictions/Preferences: general  Spiritual, Cultural Beliefs, Mandaen Practices, Values that Affect Care: no  Food Allergies: NKFA  Factors Affecting Nutritional Intake: altered gastrointestinal function, decreased appetite    Anthropometrics    Temp: 98.2 °F (36.8 " "°C)  Height Method: Stated  Height: 5' 1" (154.9 cm)  Height (inches): 61 in  Weight Method: Bed Scale  Weight: 70.7 kg (155 lb 13.8 oz)  Weight (lb): 155.87 lb  Ideal Body Weight (IBW), Female: 105 lb  % Ideal Body Weight, Female (lb): 148.45 %  BMI (Calculated): 29.5  BMI Grade: 25 - 29.9 - overweight     Lab/Procedures/Meds    Pertinent Labs Reviewed: reviewed  Pertinent Labs Comments: .3  Pertinent Medications Reviewed: reviewed  Pertinent Medications Comments: acetaminophen, carvedilol, ibuprofen, insulin, psylluim husk    Estimated/Assessed Needs    Weight Used For Calorie Calculations: 70.7 kg (155 lb 13.8 oz)  Energy Calorie Requirements (kcal): 1461 kcal/day  Energy Need Method: [place]-St Jeor(x 1.25)  Protein Requirements: 85-99 g/day  Weight Used For Protein Calculations: 70.7 kg (155 lb 13.8 oz)  Fluid Requirements (mL): 1 mL/kcal or per MD     RDA Method (mL): 1461  CHO Requirement: 175    Nutrition Prescription Ordered    Current Diet Order: Diabetic  Current Nutrition Support Formula Ordered: (Custom TPN 90 gm AA / 250 gm Dex)  Current Nutrition Support Rate Ordered: 60 (ml)  Current Nutrition Support Frequency Ordered: mL/hr  Oral Nutrition Supplement: Boost Glucose Control    Evaluation of Received Nutrient/Fluid Intake    Parenteral Calories (kcal): 1210  Parenteral Protein (gm): 90  Parenteral Fluid (mL): 1800  Lipid Calories (kcals): 0 kcals  GIR (Glucose Infusion Rate) (mg/kg/min): 2.46 mg/kg/min  % Kcal Needs: 83  % Protein Needs: 100  Energy Calories Required: meeting needs  Protein Required: meeting needs  Fluid Required: (per MD)  Comments: LBM 8/18(ostomy)  Tolerance: tolerating  % Intake of Estimated Energy Needs: 75 - 100 %  % Meal Intake: 0 - 25 %    Nutrition Risk    Level of Risk/Frequency of Follow-up: (1x/week)     Assessment and Plan    Moderate Protein-Calorie Malnutrition  Malnutrition in the context of Acute Illness/Injury     Related to (etiology):  Decreased " intake      Signs and Symptoms (as evidenced by):  Energy Intake: <75% of estimated energy requirement for >1 week  Body Fat Depletion: mild depletion of triceps   Muscle Mass Depletion: mild and moderate depletion of temples, clavicle region, interosseous muscle and lower extremities   Weight Loss: 6% x unsure of time frame per pt      Interventions (treatment strategy):  Collaboration of care with other providers  Parenteral Nutrition     Nutrition Diagnosis Status:  Improving      Monitor and Evaluation    Food and Nutrient Intake: energy intake, food and beverage intake, parenteral nutrition intake  Food and Nutrient Adminstration: diet order, enteral and parenteral nutrition administration  Physical Activity and Function: nutrition-related ADLs and IADLs  Anthropometric Measurements: weight, weight change, body mass index  Biochemical Data, Medical Tests and Procedures: electrolyte and renal panel, gastrointestinal profile, glucose/endocrine profile, lipid profile, inflammatory profile  Nutrition-Focused Physical Findings: overall appearance     Malnutrition Assessment  Malnutrition Type: acute illness or injury          Weight Loss (Malnutrition): (6% unsure of time frame)  Energy Intake (Malnutrition): less than 75% for greater than 7 days  Subcutaneous Fat (Malnutrition): moderate depletion  Muscle Mass (Malnutrition): moderate depletion   Orbital Region (Subcutaneous Fat Loss): mild depletion  Upper Arm Region (Subcutaneous Fat Loss): moderate depletion   [place] Region (Muscle Loss): mild depletion  Clavicle Bone Region (Muscle Loss): mild depletion  Clavicle and Acromion Bone Region (Muscle Loss): mild depletion  Dorsal Hand (Muscle Loss): mild depletion  Anterior Thigh Region (Muscle Loss): moderate depletion  Posterior Calf Region (Muscle Loss): moderate depletion   Edema (Fluid Accumulation): 0-->no edema present             Nutrition Follow-Up    RD Follow-up?: Yes

## 2020-08-19 NOTE — ASSESSMENT & PLAN NOTE
BG goal 140-180    - Continue Novolog 6 units q 4 hrs while on TPN. HOLD if TPN is stopped for any reason.  - Low Dose Correction Scale PRN BG excursions.   - BG monitoring q 4 hrs while on TPN (coordinate with meals during the day)     ** Please call Endocrine for any BG related issues **  ** Please call Endocrine if TPN is restarted or diet changes **    Discharge plans:  TBD. Please notify endocrinology prior to discharge.

## 2020-08-19 NOTE — PLAN OF CARE
08/19/20 1102   Post-Acute Status   Post-Acute Authorization [facility] not in network with pt's insurance. [facility] wound vac form placed in pt's chart to be filled out.      UPDATE:11:52 AM  SHAZIA faxed [facility] referral to Princess at [facility].    Christa Steel LMSW  [facility]  [phone]

## 2020-08-19 NOTE — PROGRESS NOTES
Wound care follow up for ileostomy/wound vac seal check.    Received pt lying in bed awake and alert with wound vac in use. Pt requests that she would like something for pain. Notified pt's nurse.    Upon assessment of RLQ ileostomy: pouch intact with thick brown liquid stool with belt in use to help anchor. Emptied pouch of 200 ml of brown liquid stool. Left pouch intact this time.     To abdominal midline: wound vac intact with good seal noted at 125mmHg continuous pressure. Next wound vac change would be Friday.    (see assessment below)    Per Case Management's progress note from today, pt is slated to discharge with home health or LTAC with wound vac.     Wound care will continue to follow pt for ileostomy management and wound vac dressing changes. [id number]         08/19/20 0846        Colostomy 07/31/20 1943 Loop RLQ   Placement Date/Time: 07/31/20 1943   Present Prior to Hospital Arrival?: No  Inserted by: MD  Colostomy Type: Loop  Location: RLQ   Stomal Appliance 1 piece;Intact   Stoma Appearance round;rosebud appearance;moist   Stoma Size (in) 25   Site Assessment NAKUL   Peristomal Assessment NAKUL   Accessories/Skin Care appliance belt   Stoma Function flatus;stool;brown;thick liquid   Tolerance no signs/symptoms of discomfort   Output (mL) 200 mL

## 2020-08-20 LAB
ALBUMIN SERPL BCP-MCNC: 2 G/DL (ref 3.5–5.2)
ALBUMIN SERPL BCP-MCNC: 2 G/DL (ref 3.5–5.2)
ALP SERPL-CCNC: 215 U/L (ref 55–135)
ALP SERPL-CCNC: 215 U/L (ref 55–135)
ALT SERPL W/O P-5'-P-CCNC: 20 U/L (ref 10–44)
ALT SERPL W/O P-5'-P-CCNC: 20 U/L (ref 10–44)
ANION GAP SERPL CALC-SCNC: 9 MMOL/L (ref 8–16)
ANION GAP SERPL CALC-SCNC: 9 MMOL/L (ref 8–16)
AST SERPL-CCNC: 23 U/L (ref 10–40)
AST SERPL-CCNC: 23 U/L (ref 10–40)
BASOPHILS # BLD AUTO: 0.04 K/UL (ref 0–0.2)
BASOPHILS # BLD AUTO: 0.04 K/UL (ref 0–0.2)
BASOPHILS NFR BLD: 0.4 % (ref 0–1.9)
BASOPHILS NFR BLD: 0.4 % (ref 0–1.9)
BILIRUB SERPL-MCNC: 0.2 MG/DL (ref 0.1–1)
BILIRUB SERPL-MCNC: 0.2 MG/DL (ref 0.1–1)
BUN SERPL-MCNC: 27 MG/DL (ref 8–23)
BUN SERPL-MCNC: 27 MG/DL (ref 8–23)
CALCIUM SERPL-MCNC: 9.2 MG/DL (ref 8.7–10.5)
CALCIUM SERPL-MCNC: 9.2 MG/DL (ref 8.7–10.5)
CHLORIDE SERPL-SCNC: 102 MMOL/L (ref 95–110)
CHLORIDE SERPL-SCNC: 102 MMOL/L (ref 95–110)
CO2 SERPL-SCNC: 24 MMOL/L (ref 23–29)
CO2 SERPL-SCNC: 24 MMOL/L (ref 23–29)
CREAT SERPL-MCNC: 0.8 MG/DL (ref 0.5–1.4)
CREAT SERPL-MCNC: 0.8 MG/DL (ref 0.5–1.4)
CRP SERPL-MCNC: 72.8 MG/L (ref 0–8.2)
CRP SERPL-MCNC: 72.8 MG/L (ref 0–8.2)
DIFFERENTIAL METHOD: ABNORMAL
DIFFERENTIAL METHOD: ABNORMAL
EOSINOPHIL # BLD AUTO: 0.6 K/UL (ref 0–0.5)
EOSINOPHIL # BLD AUTO: 0.6 K/UL (ref 0–0.5)
EOSINOPHIL NFR BLD: 5.6 % (ref 0–8)
EOSINOPHIL NFR BLD: 5.6 % (ref 0–8)
ERYTHROCYTE [DISTWIDTH] IN BLOOD BY AUTOMATED COUNT: 17.1 % (ref 11.5–14.5)
ERYTHROCYTE [DISTWIDTH] IN BLOOD BY AUTOMATED COUNT: 17.1 % (ref 11.5–14.5)
EST. GFR  (AFRICAN AMERICAN): >60 ML/MIN/1.73 M^2
EST. GFR  (AFRICAN AMERICAN): >60 ML/MIN/1.73 M^2
EST. GFR  (NON AFRICAN AMERICAN): >60 ML/MIN/1.73 M^2
EST. GFR  (NON AFRICAN AMERICAN): >60 ML/MIN/1.73 M^2
GLUCOSE SERPL-MCNC: 152 MG/DL (ref 70–110)
GLUCOSE SERPL-MCNC: 152 MG/DL (ref 70–110)
HCT VFR BLD AUTO: 27.2 % (ref 37–48.5)
HCT VFR BLD AUTO: 27.2 % (ref 37–48.5)
HGB BLD-MCNC: 8.4 G/DL (ref 12–16)
HGB BLD-MCNC: 8.4 G/DL (ref 12–16)
IMM GRANULOCYTES # BLD AUTO: 0.08 K/UL (ref 0–0.04)
IMM GRANULOCYTES # BLD AUTO: 0.08 K/UL (ref 0–0.04)
IMM GRANULOCYTES NFR BLD AUTO: 0.8 % (ref 0–0.5)
IMM GRANULOCYTES NFR BLD AUTO: 0.8 % (ref 0–0.5)
LYMPHOCYTES # BLD AUTO: 1.7 K/UL (ref 1–4.8)
LYMPHOCYTES # BLD AUTO: 1.7 K/UL (ref 1–4.8)
LYMPHOCYTES NFR BLD: 17 % (ref 18–48)
LYMPHOCYTES NFR BLD: 17 % (ref 18–48)
MAGNESIUM SERPL-MCNC: 2.1 MG/DL (ref 1.6–2.6)
MCH RBC QN AUTO: 25.5 PG (ref 27–31)
MCH RBC QN AUTO: 25.5 PG (ref 27–31)
MCHC RBC AUTO-ENTMCNC: 30.9 G/DL (ref 32–36)
MCHC RBC AUTO-ENTMCNC: 30.9 G/DL (ref 32–36)
MCV RBC AUTO: 82 FL (ref 82–98)
MCV RBC AUTO: 82 FL (ref 82–98)
MONOCYTES # BLD AUTO: 0.7 K/UL (ref 0.3–1)
MONOCYTES # BLD AUTO: 0.7 K/UL (ref 0.3–1)
MONOCYTES NFR BLD: 7 % (ref 4–15)
MONOCYTES NFR BLD: 7 % (ref 4–15)
NEUTROPHILS # BLD AUTO: 6.8 K/UL (ref 1.8–7.7)
NEUTROPHILS # BLD AUTO: 6.8 K/UL (ref 1.8–7.7)
NEUTROPHILS NFR BLD: 69.2 % (ref 38–73)
NEUTROPHILS NFR BLD: 69.2 % (ref 38–73)
NRBC BLD-RTO: 0 /100 WBC
NRBC BLD-RTO: 0 /100 WBC
PHOSPHATE SERPL-MCNC: 2.6 MG/DL (ref 2.7–4.5)
PLATELET # BLD AUTO: 740 K/UL (ref 150–350)
PLATELET # BLD AUTO: 740 K/UL (ref 150–350)
PMV BLD AUTO: 8.6 FL (ref 9.2–12.9)
PMV BLD AUTO: 8.6 FL (ref 9.2–12.9)
POCT GLUCOSE: 141 MG/DL (ref 70–110)
POCT GLUCOSE: 144 MG/DL (ref 70–110)
POCT GLUCOSE: 145 MG/DL (ref 70–110)
POCT GLUCOSE: 175 MG/DL (ref 70–110)
POTASSIUM SERPL-SCNC: 4.4 MMOL/L (ref 3.5–5.1)
POTASSIUM SERPL-SCNC: 4.4 MMOL/L (ref 3.5–5.1)
PROT SERPL-MCNC: 9.1 G/DL (ref 6–8.4)
PROT SERPL-MCNC: 9.1 G/DL (ref 6–8.4)
RBC # BLD AUTO: 3.3 M/UL (ref 4–5.4)
RBC # BLD AUTO: 3.3 M/UL (ref 4–5.4)
SODIUM SERPL-SCNC: 135 MMOL/L (ref 136–145)
SODIUM SERPL-SCNC: 135 MMOL/L (ref 136–145)
TRIGL SERPL-MCNC: 626 MG/DL (ref 30–150)
WBC # BLD AUTO: 9.87 K/UL (ref 3.9–12.7)
WBC # BLD AUTO: 9.87 K/UL (ref 3.9–12.7)

## 2020-08-20 PROCEDURE — 63600175 PHARM REV CODE 636 W HCPCS: Performed by: STUDENT IN AN ORGANIZED HEALTH CARE EDUCATION/TRAINING PROGRAM

## 2020-08-20 PROCEDURE — 97116 GAIT TRAINING THERAPY: CPT | Mod: CQ

## 2020-08-20 PROCEDURE — C9113 INJ PANTOPRAZOLE SODIUM, VIA: HCPCS | Performed by: STUDENT IN AN ORGANIZED HEALTH CARE EDUCATION/TRAINING PROGRAM

## 2020-08-20 PROCEDURE — 97535 SELF CARE MNGMENT TRAINING: CPT

## 2020-08-20 PROCEDURE — 25000003 PHARM REV CODE 250: Performed by: STUDENT IN AN ORGANIZED HEALTH CARE EDUCATION/TRAINING PROGRAM

## 2020-08-20 PROCEDURE — 84100 ASSAY OF PHOSPHORUS: CPT

## 2020-08-20 PROCEDURE — 20600001 HC STEP DOWN PRIVATE ROOM

## 2020-08-20 PROCEDURE — 83735 ASSAY OF MAGNESIUM: CPT

## 2020-08-20 PROCEDURE — 86140 C-REACTIVE PROTEIN: CPT

## 2020-08-20 PROCEDURE — 85025 COMPLETE CBC W/AUTO DIFF WBC: CPT

## 2020-08-20 PROCEDURE — 63600175 PHARM REV CODE 636 W HCPCS: Performed by: PHYSICIAN ASSISTANT

## 2020-08-20 PROCEDURE — 84478 ASSAY OF TRIGLYCERIDES: CPT

## 2020-08-20 PROCEDURE — 25000003 PHARM REV CODE 250: Performed by: PHYSICIAN ASSISTANT

## 2020-08-20 PROCEDURE — A4216 STERILE WATER/SALINE, 10 ML: HCPCS | Performed by: STUDENT IN AN ORGANIZED HEALTH CARE EDUCATION/TRAINING PROGRAM

## 2020-08-20 PROCEDURE — 80053 COMPREHEN METABOLIC PANEL: CPT

## 2020-08-20 PROCEDURE — 25000242 PHARM REV CODE 250 ALT 637 W/ HCPCS: Performed by: COLON & RECTAL SURGERY

## 2020-08-20 RX ORDER — INSULIN ASPART 100 [IU]/ML
6 INJECTION, SOLUTION INTRAVENOUS; SUBCUTANEOUS
Status: CANCELLED | OUTPATIENT
Start: 2020-08-21

## 2020-08-20 RX ORDER — LOPERAMIDE HYDROCHLORIDE 2 MG/1
2 CAPSULE ORAL 4 TIMES DAILY
Status: DISCONTINUED | OUTPATIENT
Start: 2020-08-20 | End: 2020-08-21 | Stop reason: HOSPADM

## 2020-08-20 RX ORDER — GLUCAGON 1 MG
1 KIT INJECTION
Status: CANCELLED | OUTPATIENT
Start: 2020-08-20

## 2020-08-20 RX ORDER — SODIUM CHLORIDE 0.9 % (FLUSH) 0.9 %
10 SYRINGE (ML) INJECTION
Status: CANCELLED | OUTPATIENT
Start: 2020-08-20

## 2020-08-20 RX ORDER — ONDANSETRON 2 MG/ML
4 INJECTION INTRAMUSCULAR; INTRAVENOUS EVERY 6 HOURS PRN
Status: CANCELLED | OUTPATIENT
Start: 2020-08-20

## 2020-08-20 RX ORDER — TRAMADOL HYDROCHLORIDE 50 MG/1
50 TABLET ORAL EVERY 6 HOURS PRN
Status: CANCELLED | OUTPATIENT
Start: 2020-08-20

## 2020-08-20 RX ORDER — OXYCODONE HYDROCHLORIDE 5 MG/1
5 TABLET ORAL EVERY 4 HOURS PRN
Status: CANCELLED | OUTPATIENT
Start: 2020-08-20

## 2020-08-20 RX ORDER — SODIUM CHLORIDE 0.9 % (FLUSH) 0.9 %
10 SYRINGE (ML) INJECTION EVERY 6 HOURS
Status: CANCELLED | OUTPATIENT
Start: 2020-08-20

## 2020-08-20 RX ORDER — PANTOPRAZOLE SODIUM 40 MG/10ML
40 INJECTION, POWDER, LYOPHILIZED, FOR SOLUTION INTRAVENOUS EVERY 24 HOURS
Status: CANCELLED | OUTPATIENT
Start: 2020-08-21

## 2020-08-20 RX ORDER — IBUPROFEN 400 MG/1
400 TABLET ORAL 3 TIMES DAILY
Status: CANCELLED | OUTPATIENT
Start: 2020-08-20

## 2020-08-20 RX ORDER — GABAPENTIN 300 MG/1
300 CAPSULE ORAL 3 TIMES DAILY
Status: CANCELLED | OUTPATIENT
Start: 2020-08-20

## 2020-08-20 RX ORDER — CARVEDILOL 12.5 MG/1
12.5 TABLET ORAL 2 TIMES DAILY
Status: CANCELLED | OUTPATIENT
Start: 2020-08-20

## 2020-08-20 RX ORDER — INSULIN ASPART 100 [IU]/ML
6 INJECTION, SOLUTION INTRAVENOUS; SUBCUTANEOUS
Status: CANCELLED | OUTPATIENT
Start: 2020-08-20

## 2020-08-20 RX ORDER — DEXTROSE MONOHYDRATE 100 MG/ML
INJECTION, SOLUTION INTRAVENOUS CONTINUOUS PRN
Status: CANCELLED | OUTPATIENT
Start: 2020-08-20

## 2020-08-20 RX ORDER — ACETAMINOPHEN 500 MG
1000 TABLET ORAL EVERY 6 HOURS
Status: CANCELLED | OUTPATIENT
Start: 2020-08-20

## 2020-08-20 RX ORDER — LOPERAMIDE HYDROCHLORIDE 2 MG/1
2 CAPSULE ORAL 4 TIMES DAILY
Status: CANCELLED | OUTPATIENT
Start: 2020-08-20

## 2020-08-20 RX ORDER — NALOXONE HCL 0.4 MG/ML
0.02 VIAL (ML) INJECTION
Status: CANCELLED | OUTPATIENT
Start: 2020-08-20

## 2020-08-20 RX ORDER — HYDROCHLOROTHIAZIDE 25 MG/1
25 TABLET ORAL DAILY
Status: CANCELLED | OUTPATIENT
Start: 2020-08-21

## 2020-08-20 RX ORDER — LIDOCAINE 50 MG/G
1 PATCH TOPICAL
Status: CANCELLED | OUTPATIENT
Start: 2020-08-20

## 2020-08-20 RX ORDER — ENOXAPARIN SODIUM 100 MG/ML
40 INJECTION SUBCUTANEOUS EVERY 24 HOURS
Status: CANCELLED | OUTPATIENT
Start: 2020-08-20

## 2020-08-20 RX ORDER — OXYCODONE HYDROCHLORIDE 10 MG/1
10 TABLET ORAL EVERY 4 HOURS PRN
Status: CANCELLED | OUTPATIENT
Start: 2020-08-20

## 2020-08-20 RX ORDER — METOCLOPRAMIDE 5 MG/1
10 TABLET ORAL
Status: CANCELLED | OUTPATIENT
Start: 2020-08-20

## 2020-08-20 RX ORDER — SCOLOPAMINE TRANSDERMAL SYSTEM 1 MG/1
1 PATCH, EXTENDED RELEASE TRANSDERMAL
Status: CANCELLED | OUTPATIENT
Start: 2020-08-22

## 2020-08-20 RX ORDER — CALCIUM CARBONATE 200(500)MG
500 TABLET,CHEWABLE ORAL 2 TIMES DAILY PRN
Status: CANCELLED | OUTPATIENT
Start: 2020-08-20

## 2020-08-20 RX ADMIN — LOPERAMIDE HYDROCHLORIDE 2 MG: 2 CAPSULE ORAL at 12:08

## 2020-08-20 RX ADMIN — SODIUM CHLORIDE: 9 INJECTION, SOLUTION INTRAVENOUS at 08:08

## 2020-08-20 RX ADMIN — INSULIN ASPART 0 UNITS: 100 INJECTION, SOLUTION INTRAVENOUS; SUBCUTANEOUS at 12:08

## 2020-08-20 RX ADMIN — INSULIN ASPART 6 UNITS: 100 INJECTION, SOLUTION INTRAVENOUS; SUBCUTANEOUS at 05:08

## 2020-08-20 RX ADMIN — INSULIN ASPART 6 UNITS: 100 INJECTION, SOLUTION INTRAVENOUS; SUBCUTANEOUS at 08:08

## 2020-08-20 RX ADMIN — HYDROCHLOROTHIAZIDE 25 MG: 25 TABLET ORAL at 08:08

## 2020-08-20 RX ADMIN — ENOXAPARIN SODIUM 40 MG: 40 INJECTION SUBCUTANEOUS at 04:08

## 2020-08-20 RX ADMIN — HYDROMORPHONE HYDROCHLORIDE 1 MG: 1 INJECTION, SOLUTION INTRAMUSCULAR; INTRAVENOUS; SUBCUTANEOUS at 10:08

## 2020-08-20 RX ADMIN — MEROPENEM 2 G: 1 INJECTION, POWDER, FOR SOLUTION INTRAVENOUS at 04:08

## 2020-08-20 RX ADMIN — INSULIN ASPART 6 UNITS: 100 INJECTION, SOLUTION INTRAVENOUS; SUBCUTANEOUS at 12:08

## 2020-08-20 RX ADMIN — GABAPENTIN 300 MG: 300 CAPSULE ORAL at 04:08

## 2020-08-20 RX ADMIN — HYDROMORPHONE HYDROCHLORIDE 1 MG: 1 INJECTION, SOLUTION INTRAMUSCULAR; INTRAVENOUS; SUBCUTANEOUS at 11:08

## 2020-08-20 RX ADMIN — IBUPROFEN 400 MG: 400 TABLET, FILM COATED ORAL at 08:08

## 2020-08-20 RX ADMIN — HYDROMORPHONE HYDROCHLORIDE 1 MG: 1 INJECTION, SOLUTION INTRAMUSCULAR; INTRAVENOUS; SUBCUTANEOUS at 08:08

## 2020-08-20 RX ADMIN — METOCLOPRAMIDE 10 MG: 5 TABLET ORAL at 04:08

## 2020-08-20 RX ADMIN — MEROPENEM 2 G: 1 INJECTION, POWDER, FOR SOLUTION INTRAVENOUS at 08:08

## 2020-08-20 RX ADMIN — ACETAMINOPHEN 1000 MG: 500 TABLET ORAL at 12:08

## 2020-08-20 RX ADMIN — Medication 10 ML: at 11:08

## 2020-08-20 RX ADMIN — GABAPENTIN 300 MG: 300 CAPSULE ORAL at 08:08

## 2020-08-20 RX ADMIN — GABAPENTIN 300 MG: 300 CAPSULE ORAL at 09:08

## 2020-08-20 RX ADMIN — LOPERAMIDE HYDROCHLORIDE 2 MG: 2 CAPSULE ORAL at 08:08

## 2020-08-20 RX ADMIN — IBUPROFEN 400 MG: 400 TABLET, FILM COATED ORAL at 09:08

## 2020-08-20 RX ADMIN — INSULIN ASPART 6 UNITS: 100 INJECTION, SOLUTION INTRAVENOUS; SUBCUTANEOUS at 04:08

## 2020-08-20 RX ADMIN — PSYLLIUM HUSK 1 PACKET: 3.4 POWDER ORAL at 08:08

## 2020-08-20 RX ADMIN — CARVEDILOL 12.5 MG: 12.5 TABLET, FILM COATED ORAL at 08:08

## 2020-08-20 RX ADMIN — OXYCODONE HYDROCHLORIDE 10 MG: 10 TABLET ORAL at 05:08

## 2020-08-20 RX ADMIN — Medication 10 ML: at 05:08

## 2020-08-20 RX ADMIN — ACETAMINOPHEN 1000 MG: 500 TABLET ORAL at 11:08

## 2020-08-20 RX ADMIN — CARVEDILOL 12.5 MG: 12.5 TABLET, FILM COATED ORAL at 09:08

## 2020-08-20 RX ADMIN — IBUPROFEN 400 MG: 400 TABLET, FILM COATED ORAL at 04:08

## 2020-08-20 RX ADMIN — LOPERAMIDE HYDROCHLORIDE 2 MG: 2 CAPSULE ORAL at 04:08

## 2020-08-20 RX ADMIN — LOPERAMIDE HYDROCHLORIDE 2 MG: 2 CAPSULE ORAL at 09:08

## 2020-08-20 RX ADMIN — PANTOPRAZOLE SODIUM 40 MG: 40 INJECTION, POWDER, LYOPHILIZED, FOR SOLUTION INTRAVENOUS at 08:08

## 2020-08-20 RX ADMIN — Medication 10 ML: at 12:08

## 2020-08-20 RX ADMIN — LIDOCAINE 1 PATCH: 50 PATCH TOPICAL at 05:08

## 2020-08-20 RX ADMIN — HYDROMORPHONE HYDROCHLORIDE 1 MG: 1 INJECTION, SOLUTION INTRAMUSCULAR; INTRAVENOUS; SUBCUTANEOUS at 05:08

## 2020-08-20 RX ADMIN — Medication 10 ML: at 06:08

## 2020-08-20 RX ADMIN — METOCLOPRAMIDE 10 MG: 5 TABLET ORAL at 06:08

## 2020-08-20 RX ADMIN — INSULIN ASPART 6 UNITS: 100 INJECTION, SOLUTION INTRAVENOUS; SUBCUTANEOUS at 11:08

## 2020-08-20 RX ADMIN — MEROPENEM 2 G: 1 INJECTION, POWDER, FOR SOLUTION INTRAVENOUS at 01:08

## 2020-08-20 RX ADMIN — ACETAMINOPHEN 1000 MG: 500 TABLET ORAL at 05:08

## 2020-08-20 RX ADMIN — METOCLOPRAMIDE 10 MG: 5 TABLET ORAL at 11:08

## 2020-08-20 NOTE — PLAN OF CARE
A&Ox4. VVS on RA. TPN @60 continuous, diabetic diet tolerated well. Wound vac in place, continuous @125. PENNY drain tolerated well with little output. Adequate UOP per bedside commode and toilet. Pain managed with PRN medications, nausea managed with PRN meds, denies vomiting. Independent ambulation in room, up in chair during day. Contact precautions maintained. Q4 accuchecks maintained. Ileostomy tolerated well with good output. Bed in lowest position, call light in reach, WCTM.      Problem: Diabetes Comorbidity  Goal: Blood Glucose Level Within Desired Range  Outcome: Ongoing, Progressing     Problem: Wound  Goal: Optimal Wound Healing  Outcome: Ongoing, Progressing     Problem: Adult Inpatient Plan of Care  Goal: Plan of Care Review  Outcome: Ongoing, Progressing  Goal: Patient-Specific Goal (Individualization)  Outcome: Ongoing, Progressing  Goal: Absence of Hospital-Acquired Illness or Injury  Outcome: Ongoing, Progressing  Goal: Optimal Comfort and Wellbeing  Outcome: Ongoing, Progressing  Goal: Readiness for Transition of Care  Outcome: Ongoing, Progressing  Goal: Rounds/Family Conference  Outcome: Ongoing, Progressing     Problem: Diabetes Comorbidity  Goal: Blood Glucose Level Within Desired Range  Outcome: Ongoing, Progressing     Problem: Infection  Goal: Infection Symptom Resolution  Outcome: Ongoing, Progressing     Problem: Skin Injury Risk Increased  Goal: Skin Health and Integrity  Outcome: Ongoing, Progressing     Problem: Fall Injury Risk  Goal: Absence of Fall and Fall-Related Injury  Outcome: Ongoing, Progressing     Problem: Hypertension Comorbidity  Goal: Blood Pressure in Desired Range  Outcome: Ongoing, Progressing

## 2020-08-20 NOTE — PT/OT/SLP PROGRESS
Physical Therapy Treatment    Patient Name:  Azucena Morrison   MRN:  [id number]    Recommendations:     Discharge Recommendations:  home health PT   Discharge Equipment Recommendations: walker, rolling   Barriers to discharge: decreased functional mobility     Assessment:     Azucena Morrison is a 70 y.o. female admitted with a medical diagnosis of Cutaneous fistula.  She presents with the following impairments/functional limitations:  weakness, impaired endurance, impaired self care skills, impaired functional mobilty, gait instability, impaired balance, decreased safety awareness, pain, impaired skin.     Rehab Prognosis: Good; patient would benefit from acute skilled PT services to address these deficits and reach maximum level of function.    Recent Surgery: Procedure(s) (LRB):  RESECTION, COLON, LOW ANTERIOR (N/A)  CYSTOSCOPY (N/A)  CATHETERIZATION, URETER (Bilateral)  CREATION, ILEOSTOMY  LYSIS, ADHESIONS 20 Days Post-Op    Plan:     During this hospitalization, patient to be seen 3 x/week to address the identified rehab impairments via gait training, therapeutic activities, therapeutic exercises, neuromuscular re-education and progress toward the following goals:    · Plan of Care Expires:  08/29/20    Subjective     Chief Complaint: no c/o  Pain/Comfort:  · Pain Rating 1: 0/10  · Pain Rating Post-Intervention 1: 0/10      Objective:     Communicated with NSG prior to session.  Patient found up in chair with peripheral IV, telemetry, wound vac upon PTA entry to room.     General Precautions: Standard, contact, fall   Orthopedic Precautions:N/A   Braces: N/A     Functional Mobility:  · Bed Mobility:     · Sit to Supine: modified independence  · Transfers:     · Sit to Stand:  supervision with rolling walker  · Toilet Transfer: supervision with  rolling walker  using  Step Transfer  · Gait: Pt ambulates ~138 ft with RW and SBA. Pt with slow antolin and downward gaze. Pt with no overt LOB. Cues for safety and RW  mgmt.       AM-PAC 6 CLICK MOBILITY  Turning over in bed (including adjusting bedclothes, sheets and blankets)?: 4  Sitting down on and standing up from a chair with arms (e.g., wheelchair, bedside commode, etc.): 3  Moving from lying on back to sitting on the side of the bed?: 4  Moving to and from a bed to a chair (including a wheelchair)?: 3  Need to walk in hospital room?: 3  Climbing 3-5 steps with a railing?: 3  Basic Mobility Total Score: 20       Therapeutic Activities and Exercises:   Pt assisted with functional mobility as noted above.   Pt encouraged to remain UIC but pt returning to bed following ambulation.     Patient left left sidelying with all lines intact and call button in reach.    GOALS:   Multidisciplinary Problems     Physical Therapy Goals        Problem: Physical Therapy Goal    Goal Priority Disciplines Outcome Goal Variances Interventions   Physical Therapy Goal     PT, PT/OT Ongoing, Progressing     Description: Goals to be met by: 20     Patient will increase functional independence with mobility by performin. Supine to sit with Contact Guard Assistance. - met  2. Sit to supine with Modified [place]. - met  3. Sit to stand transfer with Modified [place]. - met  4. Bed to chair transfer with Stand-by Assistance using Rolling Walker PRN. - met  5. Gait  x 100 feet with Stand-by Assistance using Rolling Walker PRN.  - met  6. Ascend/descend 4 stair with bilateral Handrails Stand-by Assistance.   7. Lower extremity exercise program x 20 reps per handout, with assistance as needed.                     Time Tracking:     PT Received On: 20  PT Start Time: 1332     PT Stop Time: 1347  PT Total Time (min): 15 min     Billable Minutes: Gait Training 15    Treatment Type: Treatment  PT/PTA: PTA     PTA Visit Number: 5     Froylan Chacon, YAIR  2020

## 2020-08-20 NOTE — PROGRESS NOTES
Wound care follow up for ileostomy/wound vac seal check.     Received pt sitting up in chair awake and alert with PCT at bedside. Wound vac remains intact and in use. No complaints voiced per pt.     Upon assessment of RLQ ileostomy: pouch intact with thick brown liquid stool with belt in use to help anchor. Emptied pouch of 250 ml of brown liquid stool. Left pouch intact with plans to change pouch tomorrow along with wound vac.      To abdominal midline: wound vac intact with good seal noted at 125mmHg continuous pressure. Next wound vac change is slated for Friday.     (see assessment below)     Per Dr. Patino with [facility]'s progress note from today 8/20/20, the family no longer believes home is the most suitable environment for the pt. Per MD, will try and get patient approved for LTAC prior to return home.     Wound and Ostomy Team agrees due to pt's complicated hospital course that the pt would benefit from going to a LTAC or skilled nursing facility for the continuation of IV antibiotics and wound vac therapy treatment vs going home with home health.     Wound care will continue to follow pt for ileostomy management and wound vac dressing changes. [id number]           08/20/20 1213   Gastrointestinal   Stool Occurrence 0   Last Bowel Movement 08/19/20        Colostomy 07/31/20 1943 Loop RLQ   Placement Date/Time: 07/31/20 1943   Present Prior to Hospital Arrival?: No  Inserted by: MD  Colostomy Type: Loop  Location: RLQ   Stomal Appliance 1 piece;Intact;Convexity   Stoma Appearance rosebud appearance;moist;red   Stoma Size (in) 25   Site Assessment NAKUL   Peristomal Assessment NAKUL   Accessories/Skin Care appliance belt   Stoma Function flatus;stool;brown;thick liquid   Treatment Site care   Tolerance no signs/symptoms of discomfort   Output (mL) 250 mL   Genitourinary   Urine Characteristics dark;yellow   Nutrition   Intake (%) 25%

## 2020-08-20 NOTE — ASSESSMENT & PLAN NOTE
70 y.o. female 20 Days Post-Op for Procedure(s) (LRB):  RESECTION, COLON, LOW ANTERIOR (N/A)  CYSTOSCOPY (N/A)  CATHETERIZATION, URETER (Bilateral)  CREATION, ILEOSTOMY  LYSIS, ADHESIONS   - IR drain placed 8/6: cultures growing Ecoli sensitive to Ertapenem (also has a UTI with the same Ecoli). Drain upsized  By IR on 8/12  - IR drain placed 8/17 remains in place, previous drain removed  - IV antibiotics switched to meropenem by ID and patient placed on contact precautions due to ESBL. Appreciate ID recommendations, continue therapy per recommendations. Will set up patient for IV abx for discharge  - Continue diabetic diet as tolerated  - continue nausea control w scopolamine patch  - will DC TPN today  - SCDs/DVT prophylaxis  - GI prophylaxis  - Encourage IS  - PT/OT. Encourage to chair and ambulation.  - f/u with social work regarding potential discharge Friday  - imodium increased due to loose stool  - will speak to SW regarding discharge. Family of patient no longer believes home is most suitable environment for patient, so will try and get patient approved for LTAC prior to return home      Continue communication with use of

## 2020-08-20 NOTE — SUBJECTIVE & OBJECTIVE
Subjective:     Interval History: NAEON. Patient states her pain is worse this AM, and that she has had nausea this morning. She states that she tolerated food yesterday, and was up and ambulating. Plan for discharge tomorrow, will follow up with social work regarding wound vac, abx regimen, and wound care.    Post-Op Info:  Procedure(s) (LRB):  RESECTION, COLON, LOW ANTERIOR (N/A)  CYSTOSCOPY (N/A)  CATHETERIZATION, URETER (Bilateral)  CREATION, ILEOSTOMY  LYSIS, ADHESIONS   20 Days Post-Op      Medications:  Continuous Infusions:   dextrose 10 % in water (D10W)      TPN ADULT CENTRAL LINE CUSTOM 60 mL/hr at 08/19/20 7402     Scheduled Meds:   acetaminophen  1,000 mg Oral Q6H    carvediloL  12.5 mg Oral BID    enoxaparin  40 mg Subcutaneous Q24H    fat emulsion 20%  250 mL Intravenous Daily    gabapentin  300 mg Oral TID    hydroCHLOROthiazide  25 mg Oral Daily    ibuprofen  400 mg Oral TID    insulin aspart U-100  6 Units Subcutaneous Q24H    insulin aspart U-100  6 Units Subcutaneous Q24H    insulin aspart U-100  6 Units Subcutaneous Q24H    insulin aspart U-100  6 Units Subcutaneous Q24H    insulin aspart U-100  6 Units Subcutaneous Q24H    insulin aspart U-100  6 Units Subcutaneous Q24H    lidocaine  1 patch Transdermal Q24H    loperamide  2 mg Oral QID    meropenem (MERREM) IVPB  2 g Intravenous Q8H    metoclopramide HCl  10 mg Oral TID AC    pantoprozole (PROTONIX) IV  40 mg Intravenous Q24H    psyllium husk (aspartame)  1 packet Oral Daily    scopolamine  1 patch Transdermal Q3 Days    sodium chloride 0.9%  10 mL Intravenous Q6H     PRN Meds:   sodium chloride    calcium carbonate    dextrose 10 % in water (D10W)    dextrose 50%    glucagon (human recombinant)    HYDROmorphone    HYDROmorphone    insulin aspart U-100    iohexol    naloxone    ondansetron    ondansetron    oxyCODONE    oxyCODONE    promethazine (PHENERGAN) IVPB    sodium chloride 0.9%    sodium  chloride 0.9%    traMADoL        Objective:     Vital Signs (Most Recent):  Temp: 98.3 °F (36.8 °C) (08/20/20 0704)  Pulse: 92 (08/20/20 0704)  Resp: 19 (08/20/20 0818)  BP: 132/75 (08/20/20 0704)  SpO2: 97 % (08/20/20 0704) Vital Signs (24h Range):  Temp:  [96.7 °F (35.9 °C)-98.4 °F (36.9 °C)] 98.3 °F (36.8 °C)  Pulse:  [77-92] 92  Resp:  [16-19] 19  SpO2:  [95 %-98 %] 97 %  BP: (122-155)/(60-86) 132/75     Intake/Output - Last 3 Shifts       08/18 0700 - 08/19 0659 08/19 0700 - 08/20 0659 08/20 0700 - 08/21 0659    P.O.  390 125    IV Piggyback       TPN 1010.8 60     Total Intake(mL/kg) 1010.8 (14.3) 450 (6.4) 125 (1.8)    Urine (mL/kg/hr) 2750 (1.6) 3100 (1.8) 1700 (6.6)    Emesis/NG output  0 0    Drains  0 10    Other  0 0    Stool 1270 1000 0    Blood  0 0    Total Output 4020 4100 1710    Net -3009.2 -3650 -1585           Urine Occurrence  2 x 0 x    Stool Occurrence  1 x 0 x    Emesis Occurrence  0 x 0 x          Physical Exam  Constitutional:       General: She is not in acute distress.     Appearance: Normal appearance.   HENT:      Head: Normocephalic and atraumatic.      Right Ear: External ear normal.      Left Ear: External ear normal.      Nose: Nose normal.      Mouth/Throat:      Mouth: Mucous membranes are moist.      Pharynx: Oropharynx is clear.   Eyes:      Extraocular Movements: Extraocular movements intact.   Neck:      Musculoskeletal: Normal range of motion.   Cardiovascular:      Rate and Rhythm: Normal rate.   Pulmonary:      Effort: Pulmonary effort is normal. No respiratory distress.      Comments: JOSEPH  Abdominal:      General: There is no distension.      Palpations: Abdomen is soft.      Tenderness: There is abdominal tenderness.      Comments: LLQ drain with SS output, midline wound vac to suction, no leak, RLQ ostomy pink and patent, bowel contents and gas in bag.   Musculoskeletal: Normal range of motion.   Skin:     General: Skin is warm and dry.   Neurological:      General: No  focal deficit present.      Mental Status: She is alert.   Psychiatric:         Mood and Affect: Mood normal.         Behavior: Behavior normal.       Significant Labs:  CBC (Last 3 Results):   Recent Labs   Lab 08/18/20 0420 08/19/20 0420 08/20/20 0423   WBC 9.82 8.96 9.87  9.87   RBC 3.11* 3.38* 3.30*  3.30*   HGB 7.8* 8.6* 8.4*  8.4*   HCT 25.3* 27.6* 27.2*  27.2*   * 798* 740*  740*   MCV 81* 82 82  82   MCH 25.1* 25.4* 25.5*  25.5*   MCHC 30.8* 31.2* 30.9*  30.9*     CMP:   Recent Labs   Lab 08/20/20 0423   *  152*   CALCIUM 9.2  9.2   ALBUMIN 2.0*  2.0*   PROT 9.1*  9.1*   *  135*   K 4.4  4.4   CO2 24  24     102   BUN 27*  27*   CREATININE 0.8  0.8   ALKPHOS 215*  215*   ALT 20  20   AST 23  23   BILITOT 0.2  0.2       Significant Diagnostics:  None

## 2020-08-20 NOTE — PLAN OF CARE
5 goals met. Goals remain appropriate.     Problem: Physical Therapy Goal  Goal: Physical Therapy Goal  Description: Goals to be met by: 20     Patient will increase functional independence with mobility by performin. Supine to sit with Contact Guard Assistance. - met  2. Sit to supine with Modified [place]. - met  3. Sit to stand transfer with Modified [place]. - met  4. Bed to chair transfer with Stand-by Assistance using Rolling Walker PRN. - met  5. Gait  x 100 feet with Stand-by Assistance using Rolling Walker PRN.  - met  6. Ascend/descend 4 stair with bilateral Handrails Stand-by Assistance.   7. Lower extremity exercise program x 20 reps per handout, with assistance as needed.    Outcome: Ongoing, Progressing

## 2020-08-20 NOTE — PROGRESS NOTES
[facility]  Colorectal Surgery  Progress Note    Patient Name: Azucena Morrison  MRN: [id number]  Admission Date: 7/15/2020  Hospital Length of Stay: 36 days  Attending Physician: Fabiana Valiente MD    Subjective:     Interval History: NAEON. Patient states her pain is worse this AM, and that she has had nausea this morning. She states that she tolerated food yesterday, and was up and ambulating. Plan for discharge tomorrow, will follow up with social work regarding wound vac, abx regimen, and wound care.    Post-Op Info:  Procedure(s) (LRB):  RESECTION, COLON, LOW ANTERIOR (N/A)  CYSTOSCOPY (N/A)  CATHETERIZATION, URETER (Bilateral)  CREATION, ILEOSTOMY  LYSIS, ADHESIONS   20 Days Post-Op      Medications:  Continuous Infusions:   dextrose 10 % in water (D10W)      TPN ADULT CENTRAL LINE CUSTOM 60 mL/hr at 08/19/20 1547     Scheduled Meds:   acetaminophen  1,000 mg Oral Q6H    carvediloL  12.5 mg Oral BID    enoxaparin  40 mg Subcutaneous Q24H    fat emulsion 20%  250 mL Intravenous Daily    gabapentin  300 mg Oral TID    hydroCHLOROthiazide  25 mg Oral Daily    ibuprofen  400 mg Oral TID    insulin aspart U-100  6 Units Subcutaneous Q24H    insulin aspart U-100  6 Units Subcutaneous Q24H    insulin aspart U-100  6 Units Subcutaneous Q24H    insulin aspart U-100  6 Units Subcutaneous Q24H    insulin aspart U-100  6 Units Subcutaneous Q24H    insulin aspart U-100  6 Units Subcutaneous Q24H    lidocaine  1 patch Transdermal Q24H    loperamide  2 mg Oral QID    meropenem (MERREM) IVPB  2 g Intravenous Q8H    metoclopramide HCl  10 mg Oral TID AC    pantoprozole (PROTONIX) IV  40 mg Intravenous Q24H    psyllium husk (aspartame)  1 packet Oral Daily    scopolamine  1 patch Transdermal Q3 Days    sodium chloride 0.9%  10 mL Intravenous Q6H     PRN Meds:   sodium chloride    calcium carbonate    dextrose 10 % in water (D10W)    dextrose 50%    glucagon (human recombinant)     HYDROmorphone    HYDROmorphone    insulin aspart U-100    iohexol    naloxone    ondansetron    ondansetron    oxyCODONE    oxyCODONE    promethazine (PHENERGAN) IVPB    sodium chloride 0.9%    sodium chloride 0.9%    traMADoL        Objective:     Vital Signs (Most Recent):  Temp: 98.3 °F (36.8 °C) (08/20/20 0704)  Pulse: 92 (08/20/20 0704)  Resp: 19 (08/20/20 0818)  BP: 132/75 (08/20/20 0704)  SpO2: 97 % (08/20/20 0704) Vital Signs (24h Range):  Temp:  [96.7 °F (35.9 °C)-98.4 °F (36.9 °C)] 98.3 °F (36.8 °C)  Pulse:  [77-92] 92  Resp:  [16-19] 19  SpO2:  [95 %-98 %] 97 %  BP: (122-155)/(60-86) 132/75     Intake/Output - Last 3 Shifts       08/18 0700 - 08/19 0659 08/19 0700 - 08/20 0659 08/20 0700 - 08/21 0659    P.O.  390 125    IV Piggyback       TPN 1010.8 60     Total Intake(mL/kg) 1010.8 (14.3) 450 (6.4) 125 (1.8)    Urine (mL/kg/hr) 2750 (1.6) 3100 (1.8) 1700 (6.6)    Emesis/NG output  0 0    Drains  0 10    Other  0 0    Stool 1270 1000 0    Blood  0 0    Total Output 4020 4100 1710    Net -3009.2 -7270 -7024           Urine Occurrence  2 x 0 x    Stool Occurrence  1 x 0 x    Emesis Occurrence  0 x 0 x          Physical Exam  Constitutional:       General: She is not in acute distress.     Appearance: Normal appearance.   HENT:      Head: Normocephalic and atraumatic.      Right Ear: External ear normal.      Left Ear: External ear normal.      Nose: Nose normal.      Mouth/Throat:      Mouth: Mucous membranes are moist.      Pharynx: Oropharynx is clear.   Eyes:      Extraocular Movements: Extraocular movements intact.   Neck:      Musculoskeletal: Normal range of motion.   Cardiovascular:      Rate and Rhythm: Normal rate.   Pulmonary:      Effort: Pulmonary effort is normal. No respiratory distress.      Comments: JOSEPH  Abdominal:      General: There is no distension.      Palpations: Abdomen is soft.      Tenderness: There is abdominal tenderness.      Comments: LLQ drain with SS output,  midline wound vac to suction, no leak, RLQ ostomy pink and patent, bowel contents and gas in bag.   Musculoskeletal: Normal range of motion.   Skin:     General: Skin is warm and dry.   Neurological:      General: No focal deficit present.      Mental Status: She is alert.   Psychiatric:         Mood and Affect: Mood normal.         Behavior: Behavior normal.       Significant Labs:  CBC (Last 3 Results):   Recent Labs   Lab 08/18/20  0420 08/19/20  0420 08/20/20  0423   WBC 9.82 8.96 9.87  9.87   RBC 3.11* 3.38* 3.30*  3.30*   HGB 7.8* 8.6* 8.4*  8.4*   HCT 25.3* 27.6* 27.2*  27.2*   * 798* 740*  740*   MCV 81* 82 82  82   MCH 25.1* 25.4* 25.5*  25.5*   MCHC 30.8* 31.2* 30.9*  30.9*     CMP:   Recent Labs   Lab 08/20/20 0423   *  152*   CALCIUM 9.2  9.2   ALBUMIN 2.0*  2.0*   PROT 9.1*  9.1*   *  135*   K 4.4  4.4   CO2 24  24     102   BUN 27*  27*   CREATININE 0.8  0.8   ALKPHOS 215*  215*   ALT 20  20   AST 23  23   BILITOT 0.2  0.2       Significant Diagnostics:  None    Assessment/Plan:     * Cutaneous fistula  70 y.o. female 20 Days Post-Op for Procedure(s) (LRB):  RESECTION, COLON, LOW ANTERIOR (N/A)  CYSTOSCOPY (N/A)  CATHETERIZATION, URETER (Bilateral)  CREATION, ILEOSTOMY  LYSIS, ADHESIONS   - IR drain placed 8/6: cultures growing Ecoli sensitive to Ertapenem (also has a UTI with the same Ecoli). Drain upsized  By IR on 8/12  - IR drain placed 8/17 remains in place, previous drain removed  - IV antibiotics switched to meropenem by ID and patient placed on contact precautions due to ESBL. Appreciate ID recommendations, continue therapy per recommendations. Will set up patient for IV abx for discharge  - Continue diabetic diet as tolerated  - continue nausea control w scopolamine patch  - will DC TPN today  - SCDs/DVT prophylaxis  - GI prophylaxis  - Encourage IS  - PT/OT. Encourage to chair and ambulation.  - f/u with social work regarding potential  discharge Friday  - imodium increased due to loose stool  - will speak to SW regarding discharge. Family of patient no longer believes home is most suitable environment for patient, so will try and get patient approved for LTAC prior to return home      Continue communication with use of       On total parenteral nutrition (TPN)  Reorder.    UTI (urinary tract infection)  Cultures growing E. Coli sensitive to ertapenem. First dose given 8/7. Infectious diseases consulted. Switched to meropenem on 8/13 by ID for ESBL E. Coli. Anticipated long course IV antibiotic treatment. Appreciate recommendations.     Acute blood loss anemia  H/H has remained stable, f/u morning labs      Chronic kidney disease, stage 3  Trend BUN/Cr  Monitor UOP    Generalized abdominal pain  Continue standing tylenol, ibuprofen, gabapentin  PCA discontinued  PO oxycodone PRN for pain control    CT w/ interval increase in size in both midline and right sided anterior abdominal fluid collections, patient remaining NPO for IR drain placement today  IR consulted    Essential hypertension  Home medications resumed  Can increase carvedilol as needed if BP still elevated    Type 2 diabetes mellitus without complication, with long-term current use of insulin  SSI  On scheduled insulin ordered by Endo team.  Accu check Q6    Endocrinology consulted. Appreciate their recommendations.           Alton Patino MD  Colorectal Surgery  [facility]

## 2020-08-20 NOTE — NURSING
Azucena Nickerson, daughter in law would like the doctor to please call her.  [phone]. Would like to discuss the patient coming home and the home situation at the moment.

## 2020-08-20 NOTE — CARE UPDATE
BG goal 140 - 180     Diet diabetic [facility]; 2000 Calorie  TPN ADULT CENTRAL LINE CUSTOM  20 Days Post-Op    - Novolog 6 units every 4 hours while on TPN. Please HOLD is TPN is stopped for any reason.  - Low Dose SQ Insulin Correction Scale.  - BG monitoring every 4 hours while on TPN (coordinate with meals during the day).     ** Please call Endocrine for any BG related issues **  ** Please notify Endocrine for any change and/or advance in diet**    Discharge Plans:   TBD. Please notify endocrinology prior to discharge.

## 2020-08-20 NOTE — PT/OT/SLP PROGRESS
Occupational Therapy   Treatment    Name: Azucena Morrison  MRN: [id number]  Admitting Diagnosis:  Cutaneous fistula  20 Days Post-Op   Procedure(s):  RESECTION, COLON, LOW ANTERIOR  CYSTOSCOPY  CATHETERIZATION, URETER  CREATION, ILEOSTOMY  LYSIS, ADHESIONS     Recommendations:     Discharge Recommendations: home health OT  Discharge Equipment Recommendations:  walker, rolling  Barriers to discharge:  None    Assessment:     Azucena Morrison is a 70 y.o. female with a medical diagnosis of Cutaneous fistula.  She presents with good effort this day and tolerates session well. Performance deficits affecting function are weakness, impaired endurance, impaired self care skills, impaired functional mobilty, gait instability, impaired balance, decreased safety awareness, pain, impaired skin.     Rehab Prognosis:  Good; patient would benefit from acute skilled OT services to address these deficits and reach maximum level of function.       Plan:     Patient to be seen 3 x/week to address the above listed problems via self-care/home management, therapeutic activities, therapeutic exercises  · Plan of Care Expires: 08/31/20  · Plan of Care Reviewed with: patient    Subjective     Pain/Comfort:  · Pain Rating 1: 10/10  · Location 1: abdomen  · Pain Addressed 1: Nurse notified    Objective:     Communicated with: RN prior to session.  Patient found HOB elevated with peripheral IV, telemetry upon OT entry to room.    General Precautions: Standard, contact, fall   Orthopedic Precautions:N/A   Braces: N/A     Occupational Performance:     Bed Mobility:    · Patient completed Supine to Sit to L side EOB with modified independence and HOB elevated  · Patient completed Scooting anteriorly to EOB for foot placement on floor with independence    Functional Mobility/Transfers:  · Patient completed Sit <> Stand Transfer with supervision with no assistive device   · Patient completed Toilet Transfer onto the toilet in the restroom using Step  Transfer technique with stand by assistance with no AD  · Functional Mobility: ~30' within room with SPV-SBA and no AD, verbal cueing for safety with lines    Activities of Daily Living:  · Grooming: Patient participated in hand and oral hygiene and a face wash with a washcloth while standing at the sink in the restroom with SBA. Patient brushed her hair while sitting up in the chair with set-up assist.  · Bathing: stand by assistance Patient engaged in simulated bathing with a washcloth and soap while standing in the bathroom with SBA.  · Upper Body Dressing: minimum assistance Patient doffed/donned hospital gowns while standing in the restroom with min assist for line management.  · Toileting: independence Patient completed toileting on the toilet in the restroom independently.    [facility] 6 Click ADL: 21    Treatment & Education:  Role of OT/POC  Call button for assistance    Patient left up in chair with all lines intact, call button in reach and RN notifiedEducation:      GOALS:   Multidisciplinary Problems     Occupational Therapy Goals        Problem: Occupational Therapy Goal    Goal Priority Disciplines Outcome Interventions   Occupational Therapy Goal     OT, PT/OT Ongoing, Progressing    Description: Goals to be met by: 8/18/20     Patient will increase functional independence with ADLs by performing:    Feeding with [place].  UE Dressing with [place].  LE Dressing with Stand-by Assistance.  Grooming while standing at sink with Supervision.  Toileting from toilet with Supervision for hygiene and clothing management. -MET 8/13  Bathing from  standing at sink with Supervision.  Toilet transfer to toilet with Supervision.                     Time Tracking:     OT Date of Treatment: 08/20/20  OT Start Time: 1131  OT Stop Time: 1157  OT Total Time (min): 26 min    Billable Minutes:Self Care/Home Management 26 minutes    Mary Dickerson OT  8/20/2020

## 2020-08-20 NOTE — PLAN OF CARE
08/20/20 1455   Post-Acute Status   Post-Acute Authorization Placement   Post-Acute Placement Status Authorization Obtained   Pt needing LTAC placement for medical complexity and decreased caregiver support. SW received call from pt's daughter in law, Azucena (pt lives with her), that she, her  (pt's son) and children are sick with COVID.   Referral resent to [facility] [place]. Authorization obtained for admit tomorrow.    .Christa Steel LMSW  [facility]  [id number]

## 2020-08-20 NOTE — PLAN OF CARE
POC reviewed with pt. Verbalized understanding.AAOX'4. VSS on RA.Pt has  TPN @60 continuous and diabetic diet tolerated well. Wound vac in place, continuous @125. PENNY drain tolerated well with little output. Adequate UOP per bedside commode. Pain managed with PRN pain  meds as per MD's order.Independent ambulation in room. Contact precautions maintained. Pt has q4h accuchecks.Ileostomy intact with good output.No acute events. Bed in lowest position with  call light within reach. WCTM.

## 2020-08-20 NOTE — PLAN OF CARE
Problem: Occupational Therapy Goal  Goal: Occupational Therapy Goal  Description: Goals to be met by: 8/18/20     Patient will increase functional independence with ADLs by performing:    Feeding with [place].  UE Dressing with [place].  LE Dressing with Stand-by Assistance.  Grooming while standing at sink with Supervision.  Toileting from toilet with Supervision for hygiene and clothing management. -MET 8/13  Bathing from  standing at sink with Supervision.  Toilet transfer to toilet with Supervision.    Outcome: Ongoing, Progressing    Progressing with POC.  Mary Dickerson OT  8/20/2020

## 2020-08-21 VITALS
HEART RATE: 104 BPM | WEIGHT: 155.88 LBS | SYSTOLIC BLOOD PRESSURE: 132 MMHG | TEMPERATURE: 98 F | HEIGHT: 61 IN | OXYGEN SATURATION: 99 % | BODY MASS INDEX: 29.43 KG/M2 | RESPIRATION RATE: 18 BRPM | DIASTOLIC BLOOD PRESSURE: 73 MMHG

## 2020-08-21 PROBLEM — Z78.9 ON TOTAL PARENTERAL NUTRITION (TPN): Status: RESOLVED | Noted: 2020-08-06 | Resolved: 2020-08-21

## 2020-08-21 LAB
ALBUMIN SERPL BCP-MCNC: 2 G/DL (ref 3.5–5.2)
ALP SERPL-CCNC: 256 U/L (ref 55–135)
ALT SERPL W/O P-5'-P-CCNC: 29 U/L (ref 10–44)
ANION GAP SERPL CALC-SCNC: 11 MMOL/L (ref 8–16)
AST SERPL-CCNC: 43 U/L (ref 10–40)
BACTERIA SPEC AEROBE CULT: ABNORMAL
BACTERIA SPEC ANAEROBE CULT: NORMAL
BASOPHILS # BLD AUTO: 0.04 K/UL (ref 0–0.2)
BASOPHILS NFR BLD: 0.5 % (ref 0–1.9)
BILIRUB SERPL-MCNC: 0.3 MG/DL (ref 0.1–1)
BUN SERPL-MCNC: 29 MG/DL (ref 8–23)
CALCIUM SERPL-MCNC: 9.4 MG/DL (ref 8.7–10.5)
CHLORIDE SERPL-SCNC: 102 MMOL/L (ref 95–110)
CO2 SERPL-SCNC: 23 MMOL/L (ref 23–29)
CREAT SERPL-MCNC: 0.9 MG/DL (ref 0.5–1.4)
CRP SERPL-MCNC: 72.2 MG/L (ref 0–8.2)
DIFFERENTIAL METHOD: ABNORMAL
EOSINOPHIL # BLD AUTO: 0.6 K/UL (ref 0–0.5)
EOSINOPHIL NFR BLD: 6.9 % (ref 0–8)
ERYTHROCYTE [DISTWIDTH] IN BLOOD BY AUTOMATED COUNT: 17.2 % (ref 11.5–14.5)
EST. GFR  (AFRICAN AMERICAN): >60 ML/MIN/1.73 M^2
EST. GFR  (NON AFRICAN AMERICAN): >60 ML/MIN/1.73 M^2
GLUCOSE SERPL-MCNC: 94 MG/DL (ref 70–110)
HCT VFR BLD AUTO: 27.2 % (ref 37–48.5)
HGB BLD-MCNC: 8.4 G/DL (ref 12–16)
IMM GRANULOCYTES # BLD AUTO: 0.07 K/UL (ref 0–0.04)
IMM GRANULOCYTES NFR BLD AUTO: 0.8 % (ref 0–0.5)
LYMPHOCYTES # BLD AUTO: 1.8 K/UL (ref 1–4.8)
LYMPHOCYTES NFR BLD: 21.4 % (ref 18–48)
MAGNESIUM SERPL-MCNC: 2 MG/DL (ref 1.6–2.6)
MCH RBC QN AUTO: 24.9 PG (ref 27–31)
MCHC RBC AUTO-ENTMCNC: 30.9 G/DL (ref 32–36)
MCV RBC AUTO: 81 FL (ref 82–98)
MONOCYTES # BLD AUTO: 0.6 K/UL (ref 0.3–1)
MONOCYTES NFR BLD: 6.6 % (ref 4–15)
NEUTROPHILS # BLD AUTO: 5.5 K/UL (ref 1.8–7.7)
NEUTROPHILS NFR BLD: 63.8 % (ref 38–73)
NRBC BLD-RTO: 0 /100 WBC
PHOSPHATE SERPL-MCNC: 3.6 MG/DL (ref 2.7–4.5)
PLATELET # BLD AUTO: 696 K/UL (ref 150–350)
PMV BLD AUTO: 8.2 FL (ref 9.2–12.9)
POCT GLUCOSE: 85 MG/DL (ref 70–110)
POTASSIUM SERPL-SCNC: 4.4 MMOL/L (ref 3.5–5.1)
PROT SERPL-MCNC: 8.4 G/DL (ref 6–8.4)
RBC # BLD AUTO: 3.37 M/UL (ref 4–5.4)
SODIUM SERPL-SCNC: 136 MMOL/L (ref 136–145)
WBC # BLD AUTO: 8.54 K/UL (ref 3.9–12.7)

## 2020-08-21 PROCEDURE — 83735 ASSAY OF MAGNESIUM: CPT

## 2020-08-21 PROCEDURE — 85025 COMPLETE CBC W/AUTO DIFF WBC: CPT

## 2020-08-21 PROCEDURE — 25000003 PHARM REV CODE 250: Performed by: STUDENT IN AN ORGANIZED HEALTH CARE EDUCATION/TRAINING PROGRAM

## 2020-08-21 PROCEDURE — A4216 STERILE WATER/SALINE, 10 ML: HCPCS | Performed by: STUDENT IN AN ORGANIZED HEALTH CARE EDUCATION/TRAINING PROGRAM

## 2020-08-21 PROCEDURE — 86140 C-REACTIVE PROTEIN: CPT

## 2020-08-21 PROCEDURE — 80053 COMPREHEN METABOLIC PANEL: CPT

## 2020-08-21 PROCEDURE — 84100 ASSAY OF PHOSPHORUS: CPT

## 2020-08-21 PROCEDURE — 25000242 PHARM REV CODE 250 ALT 637 W/ HCPCS: Performed by: COLON & RECTAL SURGERY

## 2020-08-21 PROCEDURE — C9113 INJ PANTOPRAZOLE SODIUM, VIA: HCPCS | Performed by: STUDENT IN AN ORGANIZED HEALTH CARE EDUCATION/TRAINING PROGRAM

## 2020-08-21 PROCEDURE — 63600175 PHARM REV CODE 636 W HCPCS: Performed by: STUDENT IN AN ORGANIZED HEALTH CARE EDUCATION/TRAINING PROGRAM

## 2020-08-21 RX ORDER — INSULIN ASPART 100 [IU]/ML
4 INJECTION, SOLUTION INTRAVENOUS; SUBCUTANEOUS
Status: DISCONTINUED | OUTPATIENT
Start: 2020-08-21 | End: 2020-08-21 | Stop reason: HOSPADM

## 2020-08-21 RX ORDER — INSULIN ASPART 100 [IU]/ML
4 INJECTION, SOLUTION INTRAVENOUS; SUBCUTANEOUS
Status: DISCONTINUED | OUTPATIENT
Start: 2020-08-22 | End: 2020-08-21 | Stop reason: HOSPADM

## 2020-08-21 RX ADMIN — Medication 10 ML: at 12:08

## 2020-08-21 RX ADMIN — HYDROCHLOROTHIAZIDE 25 MG: 25 TABLET ORAL at 09:08

## 2020-08-21 RX ADMIN — Medication 10 ML: at 06:08

## 2020-08-21 RX ADMIN — LOPERAMIDE HYDROCHLORIDE 2 MG: 2 CAPSULE ORAL at 09:08

## 2020-08-21 RX ADMIN — CARVEDILOL 12.5 MG: 12.5 TABLET, FILM COATED ORAL at 09:08

## 2020-08-21 RX ADMIN — GABAPENTIN 300 MG: 300 CAPSULE ORAL at 09:08

## 2020-08-21 RX ADMIN — ACETAMINOPHEN 1000 MG: 500 TABLET ORAL at 05:08

## 2020-08-21 RX ADMIN — HYDROMORPHONE HYDROCHLORIDE 1 MG: 1 INJECTION, SOLUTION INTRAMUSCULAR; INTRAVENOUS; SUBCUTANEOUS at 09:08

## 2020-08-21 RX ADMIN — METOCLOPRAMIDE 10 MG: 5 TABLET ORAL at 05:08

## 2020-08-21 RX ADMIN — INSULIN ASPART 6 UNITS: 100 INJECTION, SOLUTION INTRAVENOUS; SUBCUTANEOUS at 09:08

## 2020-08-21 RX ADMIN — IBUPROFEN 400 MG: 400 TABLET, FILM COATED ORAL at 09:08

## 2020-08-21 RX ADMIN — PSYLLIUM HUSK 1 PACKET: 3.4 POWDER ORAL at 09:08

## 2020-08-21 RX ADMIN — PANTOPRAZOLE SODIUM 40 MG: 40 INJECTION, POWDER, LYOPHILIZED, FOR SOLUTION INTRAVENOUS at 09:08

## 2020-08-21 RX ADMIN — MEROPENEM 2 G: 1 INJECTION, POWDER, FOR SOLUTION INTRAVENOUS at 12:08

## 2020-08-21 NOTE — SUBJECTIVE & OBJECTIVE
"Interval HPI:   Overnight events:   BG is now below goal on current SQ insulin regimen.   Diet diabetic [facility]; 2000 Calorie  21 Days Post-Op    Eating:   <25%  Nausea: No  Hypoglycemia and intervention: No  Fever: No  TPN and/or TF: No  If yes, type of TF/TPN and rate: None    /71 (BP Location: Left arm, Patient Position: Lying)   Pulse 84   Temp 97.5 °F (36.4 °C) (Oral)   Resp 16   Ht 5' 1" (1.549 m)   Wt 70.7 kg (155 lb 13.8 oz)   LMP  (LMP Unknown)   SpO2 96%   Breastfeeding No   BMI 29.45 kg/m²     Labs Reviewed and Include    Recent Labs   Lab 08/21/20  0427   GLU 94   CALCIUM 9.4   ALBUMIN 2.0*   PROT 8.4      K 4.4   CO2 23      BUN 29*   CREATININE 0.9   ALKPHOS 256*   ALT 29   AST 43*   BILITOT 0.3     Lab Results   Component Value Date    WBC 8.54 08/21/2020    HGB 8.4 (L) 08/21/2020    HCT 27.2 (L) 08/21/2020    MCV 81 (L) 08/21/2020     (H) 08/21/2020     No results for input(s): TSH, FREET4 in the last 168 hours.  Lab Results   Component Value Date    HGBA1C 6.7 (H) 07/09/2020       Nutritional status:   Body mass index is 29.45 kg/m².  Lab Results   Component Value Date    ALBUMIN 2.0 (L) 08/21/2020    ALBUMIN 2.0 (L) 08/20/2020    ALBUMIN 2.0 (L) 08/20/2020     Lab Results   Component Value Date    PREALBUMIN 21 07/15/2020       Estimated Creatinine Clearance: 53.1 mL/min (based on SCr of 0.9 mg/dL).    Accu-Checks  Recent Labs     08/19/20  0702 08/19/20  0901 08/19/20  1154 08/19/20  1624 08/19/20  2211 08/20/20  0507 08/20/20  0814 08/20/20  1131 08/20/20  1612 08/21/20  0514   POCTGLUCOSE 238* 192* 152* 148* 120* 141* 175* 145* 144* 85       Current Medications and/or Treatments Impacting Glycemic Control  Immunotherapy:    Immunosuppressants     None        Steroids:   Hormones (From admission, onward)    None        Pressors:    Autonomic Drugs (From admission, onward)    None        Hyperglycemia/Diabetes Medications:   Antihyperglycemics (From " admission, onward)    Start     Stop Route Frequency Ordered    08/19/20 0400  insulin aspart U-100 pen 6 Units      -- SubQ Every 24 hours (non-standard times) 08/18/20 0748    08/19/20 0000  insulin aspart U-100 pen 6 Units      -- SubQ Every 24 hours (non-standard times) 08/18/20 0748    08/18/20 2000  insulin aspart U-100 pen 6 Units      -- SubQ Every 24 hours (non-standard times) 08/18/20 0748    08/18/20 1600  insulin aspart U-100 pen 6 Units      -- SubQ Every 24 hours (non-standard times) 08/18/20 0748    08/18/20 1200  insulin aspart U-100 pen 6 Units      -- SubQ Every 24 hours (non-standard times) 08/18/20 0748    08/18/20 0800  insulin aspart U-100 pen 6 Units      -- SubQ Every 24 hours (non-standard times) 08/18/20 0748    08/14/20 1409  insulin aspart U-100 pen 0-5 Units      -- SubQ Every 4 hours PRN 08/14/20 1309

## 2020-08-21 NOTE — ASSESSMENT & PLAN NOTE
BG goal 140-180    - Decrease Novolog to 4 units q 4 hrs while on TPN. HOLD if TPN is stopped for any reason.  - Low Dose Correction Scale PRN BG excursions.   - BG monitoring q 4 hrs while on TPN (coordinate with meals during the day)     ** Please call Endocrine for any BG related issues **  ** Please call Endocrine if TPN is restarted or diet changes **    Discharge plans:  TBD. Please notify endocrinology prior to discharge.

## 2020-08-21 NOTE — PLAN OF CARE
POC reviewed with pt. Verbalized understanding.AAOX'4. VSS on RA.Pt on  diabetic diet tolerated well. Wound vac in place, continuous @125. PENNY drain tolerated well with little output. Adequate UOP per bedside commode. Pain managed with PRN pain  meds as per MD's order.Independent ambulation in room. Contact precautions maintained. Pt has q4h accuchecks.Ileostomy intact with good output.No acute events. Bed in lowest position with  call light within reach. WCTM.

## 2020-08-21 NOTE — ASSESSMENT & PLAN NOTE
70 y.o. female 21 Days Post-Op for Procedure(s) (LRB):  RESECTION, COLON, LOW ANTERIOR (N/A)  CYSTOSCOPY (N/A)  CATHETERIZATION, URETER (Bilateral)  CREATION, ILEOSTOMY  LYSIS, ADHESIONS   - IR drain placed 8/6: cultures growing Ecoli sensitive to Ertapenem (also has a UTI with the same Ecoli). Drain upsized  By IR on 8/12  - IR drain placed 8/17 remains in place, previous drain removed  - IV antibiotics switched to meropenem by ID and patient placed on contact precautions due to ESBL. Appreciate ID recommendations, continue therapy per recommendations. Will set up patient for IV abx for discharge  - Continue diabetic diet as tolerated  - continue nausea control w scopolamine patch  - will DC TPN today  - SCDs/DVT prophylaxis  - GI prophylaxis  - Encourage IS  - PT/OT. Encourage to chair and ambulation.  - f/u with social work regarding potential discharge Friday  - imodium increased due to loose stool, improved this AM  - to LTAC today      Continue communication with use of

## 2020-08-21 NOTE — DISCHARGE SUMMARY
[facility]  Colorectal Surgery  Discharge Summary      Patient Name: Azucena Morrison  MRN: [id number]  Admission Date: 7/15/2020  Hospital Length of Stay: 37 days  Discharge Date and Time:  08/21/2020 8:24 AM  Attending Physician: Fabiana Valiente MD   Discharging Provider: Alton Patino MD  Primary Care Provider: Pj Sanches MD     HPI:  Ms. Morrison is a 69 yo F Yakut-speaking only F with PMH of HTN, DMII ( HbA1c 6.7), Chronic kidney disease stage 3 ( Cr 1.1), history of hemicolectomy in 2015 c/b chronic colocutaneous fistula status post small bowel and fistula resection, colostomy takedown, and colorectal anastomosis on 11/14/2016 by Dr. Horton c/b chronic enterocutaneous fistula who was transferred here for CRS consultation.         Complex PSH:     1997: Lap Cholecystectomy.  2010: Open Incisional hernia repair (no mesh, RUQ port site) - Dr. Romeo.   6/2015: Open [facility] in [place] for presumed diverticulitis --> c/b chronic midline wound drainage.   Saw  first time on 5/2016 for persistent midline wound drainage.     [facility] history:     8/2/2016: Wound exploration (removal of scar tissue and closure of fascial defect) - Dr. Horton --> readmitted on POD#1 with ECF.   11/14/2016: Ex lap, SBR, ECF takedown, Takedown of end colostomy with primary colorectal anastomosis. --> Post op complicated by ileus with prolonged hospital stay and wound infection requiring wound care and several I&D done in clinic.   7/11/2017: I&D and excision and drainage of abdominal wall infection and wound --> c/b recurrent ECF seen in 9/6/2017: CT scan.   9/20/2017: Fistulogram done and shows small bowel fistula.   9/2017-8/2018 --> Patient treated with prolonged IV antibiotics and wound care.   6/4/2018: Colonoscopy - Colocolonic end to end anastomosis found, 1 small polyp at the anastomosis (bx - benign) and no fistula found after injection of die through fistula.   8/6/2018: Barium enema  Results for orders placed or performed in visit on 02/12/19   AMB POC JACQUES INFLUENZA A/B TEST   Result Value Ref Range    VALID INTERNAL CONTROL POC Yes     Influenza A Ag POC Negative Negative Pos/Neg    Influenza B Ag POC Negative Negative Pos/Neg showing Complex colocutaneous fistula involving the sigmoid colon and regional small bowel loops.  8/10/2018: OR for I&D of abdominal wall abscess and wound debridement - Cultures growing ESBL E coli.   Chronic low output drainage from [facility] treated with chronic IV antibiotics.   2/22/2019: Ex lap, VERONIKA and aborted [facility] takedown due to frozen abdomen.   12/22/2019: I&D of abdominal wall abscess.     Procedure(s) (LRB):  RESECTION, COLON, LOW ANTERIOR (N/A)  CYSTOSCOPY (N/A)  CATHETERIZATION, URETER (Bilateral)  CREATION, ILEOSTOMY  LYSIS, ADHESIONS     Hospital Course:  On 7/15/20, Patient arrives as a transfer from Dr. Horton at [place] for treatment of colo-enteric and colo-cutaneous fistula. 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 Patient was started on TPN for nutrition. An NG tube was placed for intractable nausea and vomiting and placed to low intermittent suction. Surgical planning at this time involved long discussions surrounding the extent of her small bowel and colonic involvement in these cutaneous fistulae. Discussions with the family and patient consisted of counseling regarding potential need for significant small bowel resection which could result in life-long short gut and the need for a permanent vs temporary ostomy as well as potential need for lifelong TPN. These surgical options were laid out along with less invasive management involving PEG and TPN.     The NGT was removed on 7/23/20 after nausea and vomiting continued to improve, and the patient elected to proceed with surgery planned for 7/31/20.     On 7/24/20, patient was advanced to clear liquids with nutritional supplements along with her TPN.     On 7/31/20, patient was taken to the OR for extensive lysis of adhesions, partial colectomy with ascending colon to rectum anastomosis using Deloyer's derotation, small bowel resection, diverting loop  ileostomy, and cystoscopy with bilateral ureteral catheter placement. Patient was observed in the PACU during her recovery from anesthesia. After an appropriate duration of observation, patient returned to the floor. She returned to the floor with a provena wound vac on the surgical site and ostomy appliances in place.     On 8/1/20, an NG tube was placed for intractable nausea and vomiting. Patient remained NPO at this time and continued with TPN. Patient's pain control improved with PCA and prn medication for breakthrough pain.     On 8/3/20, the NG tube was removed due to resolving nausea and vomiting. She received 2 units pRBC due to down trending H/H and had an adequate response to transfusion.     On 8/5, patient had a Tmax of 101.1, and fever workup was initiated with COVID test, chest XR, blood cultures x2, and urine cultures.     On 8/6/20, patient had intractable nausea and vomiting. An NG tube was placed with 600 cc of brown fluid return. CT scan at this time revealed a large fluid collection in the anterior abdomen without evidence of contrast extravasation.     On 8/7/20, urine cultures began growing E.coli, patient was started on ciprofloxacin and then switched to ertapenem after sensitivities returned. Her H/H continued to downtrend, patient was transfused 2 units pRBC as well as IV ferric gluconate with appropriate response in H/H. IR drain was placed in anterior midline abdominal fluid collection with straw colored fluid return.     On 8/9/20, NG tube was removed.     On 8/12/20, CT scan revealed additional abdominal fluid collection, IR was consulted for additional drain placement vs drain upsize. IR subsequently up sized the drain to improve drainage of fluid collection. Patient was transitioned to clear liquid diet. IR drain culture from initial placement growing same E.coli as UTI, patient planned for long course of ertapenem, ID consulted.     On 8/14/20, IV antibiotics switched to meropenem  following ID recommendations.    On 8/15/20, patient was transitioned to diabetic diet.     On 8/16/20, CT revealed inadequate drainage of abdominal fluid collections. Patient was scheduled for additional IR drain placement on 8/17/20.     On 8/17/20, additional IR drain placed with 8+20 straw colored fluid aspirated from collection.     On 8/19/20, initial IR drain was removed. Patient was progressed to low fiber/residue diet. Imodium was added to patient medications for high output ostomy.    On 8/20/20, TPN was discontinued.     On 8/21/20, patient was discharged to LTAC facility.    Consults (From admission, onward)        Status Ordering Provider     Inpatient consult to Endocrinology  Once     Provider:  (Not yet assigned)    Completed RHONDA GOODMAN     Inpatient consult to Infectious Diseases  Once     Provider:  (Not yet assigned)    Completed AZUL FALLON     Inpatient consult to Interventional Radiology  Once     Provider:  (Not yet assigned)    Completed RHONDA GOODMAN     Inpatient consult to Interventional Radiology  Once     Provider:  (Not yet assigned)    Completed AZUL FALLON     Inpatient consult to Interventional Radiology  Once     Provider:  (Not yet assigned)    Completed AZUL FALLON     Inpatient consult to PICC team ([facility])  Once     Provider:  (Not yet assigned)    Completed ЮЛИЯ BELLO     Inpatient consult to Registered Dietitian/Nutritionist  Once     Provider:  (Not yet assigned)    Completed AZUL FALLON          Significant Diagnostic Studies: Labs:   CMP   Recent Labs   Lab 08/20/20  0423 08/21/20  0427   *  135* 136   K 4.4  4.4 4.4     102 102   CO2 24  24 23   *  152* 94   BUN 27*  27* 29*   CREATININE 0.8  0.8 0.9   CALCIUM 9.2  9.2 9.4   PROT 9.1*  9.1* 8.4   ALBUMIN 2.0*  2.0* 2.0*   BILITOT 0.2  0.2 0.3   ALKPHOS 215*  215* 256*   AST 23  23 43*   ALT 20  20 29   ANIONGAP 9  9 11    ESTGFRAFRICA >60.0  >60.0 >60.0   EGFRNONAA >60.0  >60.0 >60.0    and CBC   Recent Labs   Lab 08/20/20  0423 08/21/20 0427   WBC 9.87  9.87 8.54   HGB 8.4*  8.4* 8.4*   HCT 27.2*  27.2* 27.2*   *  740* 696*     Microbiology:   Blood Culture   Lab Results   Component Value Date    LABBLOO No growth after 5 days. 08/05/2020   , Urine Culture    Lab Results   Component Value Date    LABURIN ESCHERICHIA COLI ESBL  >100,000 cfu/ml   (A) 08/05/2020       Pending Diagnostic Studies:     Procedure Component Value Units Date/Time    Basic metabolic panel [[id number]] Collected: 07/17/20 0302    Order Status: Sent Lab Status: In process Updated: 07/17/20 0302    Specimen: Blood     C-reactive protein [[id number]] Collected: 08/15/20 1157    Order Status: Sent Lab Status: In process Updated: 08/15/20 1157    Specimen: Blood     CBC auto differential [[id number]] Collected: 08/15/20 1157    Order Status: Sent Lab Status: In process Updated: 08/15/20 1157    Specimen: Blood     CBC auto differential [[id number]] Collected: 08/02/20 0430    Order Status: Sent Lab Status: No result     Specimen: Blood     Comprehensive metabolic panel [[id number]] Collected: 08/19/20 0704    Order Status: Sent Lab Status: In process Updated: 08/19/20 0705    Specimen: Blood     Comprehensive metabolic panel [[id number]] Collected: 08/15/20 1157    Order Status: Sent Lab Status: In process Updated: 08/15/20 1157    Specimen: Blood     Comprehensive metabolic panel [[id number]] Collected: 08/02/20 0430    Order Status: Sent Lab Status: No result     Specimen: Blood     Magnesium [[id number]] Collected: 08/15/20 1157    Order Status: Sent Lab Status: In process Updated: 08/15/20 1157    Specimen: Blood     Magnesium [[id number]] Collected: 08/02/20 0430    Order Status: Sent Lab Status: No result     Specimen: Blood     Magnesium [[id number]] Collected: 07/17/20 0302    Order Status: Sent Lab Status: In process Updated: 07/17/20 0302     Specimen: Blood     Phosphorus [[id number]] Collected: 08/15/20 1157    Order Status: Sent Lab Status: In process Updated: 08/15/20 1157    Specimen: Blood     Phosphorus [[id number]] Collected: 08/02/20 0430    Order Status: Sent Lab Status: No result     Specimen: Blood     Phosphorus [[id number]] Collected: 07/17/20 0302    Order Status: Sent Lab Status: In process Updated: 07/17/20 0302    Specimen: Blood         Final Active Diagnoses:    Diagnosis Date Noted POA    PRINCIPAL PROBLEM:  Cutaneous fistula [L98.8] 07/13/2020 Yes    UTI (urinary tract infection) [N39.0] 08/06/2020 No    On total parenteral nutrition (TPN) [Z78.9] 08/06/2020 Unknown    Acute blood loss anemia [D62] 08/03/2020 No    Chronic kidney disease, stage 3 [N18.3]  Yes     Chronic    Generalized abdominal pain [R10.84]  Yes     Chronic    Essential hypertension [I10] 08/02/2016 Yes     Chronic    Type 2 diabetes mellitus without complication, with long-term current use of insulin [E11.9, Z79.4] 05/17/2016 Not Applicable     Chronic      Problems Resolved During this Admission:      Discharged Condition: fair    Disposition: Long Term Care    Follow Up:    Patient Instructions:   No discharge procedures on file.  Medications:  Transfer Medications (for Discharge Readmit only):   Current Facility-Administered Medications   Medication Dose Route Frequency Provider Last Rate Last Dose    0.9%  NaCl infusion (for blood administration)   Intravenous Q24H PRN Judy Turner MD        acetaminophen tablet 1,000 mg  1,000 mg Oral Q6H Bethany Castro MD   1,000 mg at 08/21/20 0525    calcium carbonate 200 mg calcium (500 mg) chewable tablet 500 mg  500 mg Oral BID PRN Judy Turner MD        carvediloL tablet 12.5 mg  12.5 mg Oral BID Judy Turner MD   12.5 mg at 08/20/20 2158    dextrose 10 % infusion   Intravenous Continuous PRN Va Genao NP        dextrose 50% injection 12.5 g  12.5 g Intravenous PRN  Va Genao NP        enoxaparin injection 40 mg  40 mg Subcutaneous Q24H Shyam Aguila MD   40 mg at 08/20/20 1606    gabapentin capsule 300 mg  300 mg Oral TID Bethany Castro MD   300 mg at 08/20/20 2158    glucagon (human recombinant) injection 1 mg  1 mg Intramuscular PRN Va Genao NP        hydroCHLOROthiazide tablet 25 mg  25 mg Oral Daily Judy Turner MD   25 mg at 08/20/20 0820    HYDROmorphone injection 0.5 mg  0.5 mg Intravenous Q4H PRN Judy Turner MD   0.5 mg at 08/17/20 0023    HYDROmorphone injection 1 mg  1 mg Intravenous Q4H PRN Judy Turner MD   1 mg at 08/20/20 2240    ibuprofen tablet 400 mg  400 mg Oral TID Judy Turner MD   400 mg at 08/20/20 2158    insulin aspart U-100 pen 0-5 Units  0-5 Units Subcutaneous Q4H PRN Va Genao NP   0 Units at 08/20/20 0030    insulin aspart U-100 pen 6 Units  6 Units Subcutaneous Q24H Va Genao NP   6 Units at 08/20/20 0030    insulin aspart U-100 pen 6 Units  6 Units Subcutaneous Q24H Va Genao NP   6 Units at 08/20/20 0515    insulin aspart U-100 pen 6 Units  6 Units Subcutaneous Q24H Va Genao NP   6 Units at 08/20/20 0815    insulin aspart U-100 pen 6 Units  6 Units Subcutaneous Q24H Va Genao NP   6 Units at 08/20/20 1132    insulin aspart U-100 pen 6 Units  6 Units Subcutaneous Q24H Va Genao NP   6 Units at 08/20/20 1613    insulin aspart U-100 pen 6 Units  6 Units Subcutaneous Q24H Va Genao NP   6 Units at 08/20/20 2011    iohexol (OMNIPAQUE) oral solution 15 mL  15 mL Oral PRN Avi Keenan DO   15 mL at 08/11/20 1222    lidocaine 5 % patch 1 patch  1 patch Transdermal Q24H Judy Turner MD   1 patch at 08/20/20 1725    loperamide capsule 2 mg  2 mg Oral QID Judy Turner MD   2 mg at 08/20/20 9849    meropenem (MERREM) 2 g in sodium chloride 0.9% 100 mL IVPB  2 g Intravenous Q8H Alton Patino   mL/hr at 08/21/20 0032 2 g at 08/21/20 0032    metoclopramide HCl tablet 10 mg  10 mg Oral TID AC Judy Turner MD   10 mg at 08/21/20 0559    naloxone 0.4 mg/mL injection 0.02 mg  0.02 mg Intravenous PRN Judy Turner MD        ondansetron injection 4 mg  4 mg Intravenous Q6H PRN Shyam Aguila MD   4 mg at 08/17/20 0023    ondansetron injection 8 mg  8 mg Intravenous Q6H PRN Shyam Aguila MD   8 mg at 08/13/20 1629    oxyCODONE immediate release tablet 5 mg  5 mg Oral Q4H PRN Bethany Castro MD        oxyCODONE immediate release tablet Tab 10 mg  10 mg Oral Q4H PRN Bethany Castro MD   10 mg at 08/20/20 0508    pantoprazole injection 40 mg  40 mg Intravenous Q24H Judy Turner MD   40 mg at 08/20/20 0820    promethazine (PHENERGAN) 6.25 mg in dextrose 5 % 50 mL IVPB  6.25 mg Intravenous Q6H PRN Shyam Aguila  mL/hr at 08/13/20 2051 6.25 mg at 08/13/20 2051    psyllium husk (aspartame) 3.4 gram packet 1 packet  1 packet Oral Daily Fabiana Valiente MD   1 packet at 08/20/20 0819    scopolamine 1.3-1.5 mg (1 mg over 3 days) 1 patch  1 patch Transdermal Q3 Days Ramonita Osorio MD   1 patch at 08/19/20 0920    sodium chloride 0.9% flush 10 mL  10 mL Intravenous Q6H Shyam Aguila MD   10 mL at 08/21/20 0600    And    sodium chloride 0.9% flush 10 mL  10 mL Intravenous PRN Shyam Aguila MD   10 mL at 08/12/20 1254    sodium chloride 0.9% flush 10 mL  10 mL Intra-Catheter PRN Shyam Aguila MD   10 mL at 08/19/20 1332    traMADoL tablet 50 mg  50 mg Oral Q6H PRN Shyam Aguila MD   50 mg at 08/12/20 2209       Alton Patino MD  Colorectal Surgery  [facility]

## 2020-08-21 NOTE — SUBJECTIVE & OBJECTIVE
Interval History: ZIGGY. States that her pain is well controlled this am. Nausea improved. Tolerating diet. Plan for LTAC today.\      Current Facility-Administered Medications:     0.9%  NaCl infusion (for blood administration), , Intravenous, Q24H PRN, Judy Turner MD    acetaminophen tablet 1,000 mg, 1,000 mg, Oral, Q6H, Bethany Castro MD, 1,000 mg at 08/21/20 0525    calcium carbonate 200 mg calcium (500 mg) chewable tablet 500 mg, 500 mg, Oral, BID PRN, Judy Turner MD    carvediloL tablet 12.5 mg, 12.5 mg, Oral, BID, Judy Turner MD, 12.5 mg at 08/20/20 2158    dextrose 10 % infusion, , Intravenous, Continuous PRN, Va Genao NP    dextrose 50% injection 12.5 g, 12.5 g, Intravenous, PRN, Va Genao NP    enoxaparin injection 40 mg, 40 mg, Subcutaneous, Q24H, Shyam Aguila MD, 40 mg at 08/20/20 1606    gabapentin capsule 300 mg, 300 mg, Oral, TID, Bethany Castro MD, 300 mg at 08/20/20 2158    glucagon (human recombinant) injection 1 mg, 1 mg, Intramuscular, PRN, Va Genao NP    hydroCHLOROthiazide tablet 25 mg, 25 mg, Oral, Daily, Judy Turner MD, 25 mg at 08/20/20 0820    HYDROmorphone injection 0.5 mg, 0.5 mg, Intravenous, Q4H PRN, Judy Turner MD, 0.5 mg at 08/17/20 0023    HYDROmorphone injection 1 mg, 1 mg, Intravenous, Q4H PRN, Judy Turner MD, 1 mg at 08/20/20 2240    ibuprofen tablet 400 mg, 400 mg, Oral, TID, Judy Turner MD, 400 mg at 08/20/20 2158    insulin aspart U-100 pen 0-5 Units, 0-5 Units, Subcutaneous, Q4H PRN, Va Genao NP, 0 Units at 08/20/20 0030    insulin aspart U-100 pen 6 Units, 6 Units, Subcutaneous, Q24H, Va Genao NP, 6 Units at 08/20/20 0030    insulin aspart U-100 pen 6 Units, 6 Units, Subcutaneous, Q24H, Va Genao NP, 6 Units at 08/20/20 0515    insulin aspart U-100 pen 6 Units, 6 Units, Subcutaneous, Q24H, Va Genao NP, 6 Units at  08/20/20 0815    insulin aspart U-100 pen 6 Units, 6 Units, Subcutaneous, Q24H, Va Geano NP, 6 Units at 08/20/20 1132    insulin aspart U-100 pen 6 Units, 6 Units, Subcutaneous, Q24H, Va Genao NP, 6 Units at 08/20/20 1613    insulin aspart U-100 pen 6 Units, 6 Units, Subcutaneous, Q24H, Va Genao NP, 6 Units at 08/20/20 2011    iohexol (OMNIPAQUE) oral solution 15 mL, 15 mL, Oral, PRN, Avi Keenan DO, 15 mL at 08/11/20 1222    lidocaine 5 % patch 1 patch, 1 patch, Transdermal, Q24H, Judy Turner MD, 1 patch at 08/20/20 1725    loperamide capsule 2 mg, 2 mg, Oral, QID, Judy Turner MD, 2 mg at 08/20/20 2159    meropenem (MERREM) 2 g in sodium chloride 0.9% 100 mL IVPB, 2 g, Intravenous, Q8H, Alton Patino MD, Last Rate: 100 mL/hr at 08/21/20 0032, 2 g at 08/21/20 0032    metoclopramide HCl tablet 10 mg, 10 mg, Oral, TID AC, Judy Turner MD, 10 mg at 08/21/20 0559    naloxone 0.4 mg/mL injection 0.02 mg, 0.02 mg, Intravenous, PRN, Judy Turner MD    ondansetron injection 4 mg, 4 mg, Intravenous, Q6H PRN, Shyam Aguila MD, 4 mg at 08/17/20 0023    ondansetron injection 8 mg, 8 mg, Intravenous, Q6H PRN, Shyam Aguila MD, 8 mg at 08/13/20 1629    oxyCODONE immediate release tablet 5 mg, 5 mg, Oral, Q4H PRN, Bethany Castro MD    oxyCODONE immediate release tablet Tab 10 mg, 10 mg, Oral, Q4H PRN, Bethany Castro MD, 10 mg at 08/20/20 0508    pantoprazole injection 40 mg, 40 mg, Intravenous, Q24H, Judy Turner MD, 40 mg at 08/20/20 0820    promethazine (PHENERGAN) 6.25 mg in dextrose 5 % 50 mL IVPB, 6.25 mg, Intravenous, Q6H PRN, Shyam Aguila MD, Last Rate: 150 mL/hr at 08/13/20 2051, 6.25 mg at 08/13/20 2051    psyllium husk (aspartame) 3.4 gram packet 1 packet, 1 packet, Oral, Daily, Fabiana Valiente MD, 1 packet at 08/20/20 0819    scopolamine 1.3-1.5 mg (1 mg over 3 days) 1 patch, 1 patch, Transdermal, Q3  Days, Ramonita Osorio MD, 1 patch at 08/19/20 0920    Flushing PICC Protocol, , , Until Discontinued **AND** sodium chloride 0.9% flush 10 mL, 10 mL, Intravenous, Q6H, 10 mL at 08/21/20 0600 **AND** sodium chloride 0.9% flush 10 mL, 10 mL, Intravenous, PRN, Shyam Aguila MD, 10 mL at 08/12/20 1254    sodium chloride 0.9% flush 10 mL, 10 mL, Intra-Catheter, PRN, Shyam Aguila MD, 10 mL at 08/19/20 1332    traMADoL tablet 50 mg, 50 mg, Oral, Q6H PRN, Shyam Aguila MD, 50 mg at 08/12/20 2201    Vitals:    08/21/20 0513   BP: 126/71   Pulse: 84   Resp: 16   Temp: 97.5 °F (36.4 °C)     Recent Labs   Lab 08/21/20  0427   WBC 8.54   RBC 3.37*   HGB 8.4*   HCT 27.2*   *   MCV 81*   MCH 24.9*   MCHC 30.9*       Physical Exam   Constitutional: She is oriented to person, place, and time and well-developed, well-nourished, and in no distress. No distress.   HENT:   Head: Normocephalic and atraumatic.   Eyes: EOM are normal.   Neck: Normal range of motion.   Cardiovascular: Normal rate.   Pulmonary/Chest: Effort normal. No respiratory distress.   Abdominal: Soft. She exhibits no distension. There is abdominal tenderness.   RLQ ostomy pink and patent, gas and bowel contents in bag, LLQ IR drain with SS output.   Musculoskeletal: Normal range of motion.   Neurological: She is alert and oriented to person, place, and time.   Skin: Skin is warm and dry. She is not diaphoretic.   Psychiatric: Mood and affect normal.

## 2020-08-21 NOTE — SUBJECTIVE & OBJECTIVE
Subjective:     Interval History: NAEON. Patient states that she is doing well this AM. Pain well controlled, no nausea this AM. Plan for discharge to LTAC this morning.     Post-Op Info:  Procedure(s) (LRB):  RESECTION, COLON, LOW ANTERIOR (N/A)  CYSTOSCOPY (N/A)  CATHETERIZATION, URETER (Bilateral)  CREATION, ILEOSTOMY  LYSIS, ADHESIONS   21 Days Post-Op      Medications:  Continuous Infusions:   dextrose 10 % in water (D10W)       Scheduled Meds:   acetaminophen  1,000 mg Oral Q6H    carvediloL  12.5 mg Oral BID    enoxaparin  40 mg Subcutaneous Q24H    gabapentin  300 mg Oral TID    hydroCHLOROthiazide  25 mg Oral Daily    ibuprofen  400 mg Oral TID    [START ON 8/22/2020] insulin aspart U-100  4 Units Subcutaneous Q24H    [START ON 8/22/2020] insulin aspart U-100  4 Units Subcutaneous Q24H    [START ON 8/22/2020] insulin aspart U-100  4 Units Subcutaneous Q24H    insulin aspart U-100  4 Units Subcutaneous Q24H    insulin aspart U-100  4 Units Subcutaneous Q24H    insulin aspart U-100  4 Units Subcutaneous Q24H    lidocaine  1 patch Transdermal Q24H    loperamide  2 mg Oral QID    meropenem (MERREM) IVPB  2 g Intravenous Q8H    metoclopramide HCl  10 mg Oral TID AC    pantoprozole (PROTONIX) IV  40 mg Intravenous Q24H    psyllium husk (aspartame)  1 packet Oral Daily    scopolamine  1 patch Transdermal Q3 Days    sodium chloride 0.9%  10 mL Intravenous Q6H     PRN Meds:   sodium chloride    calcium carbonate    dextrose 10 % in water (D10W)    dextrose 50%    glucagon (human recombinant)    HYDROmorphone    HYDROmorphone    insulin aspart U-100    iohexol    naloxone    ondansetron    ondansetron    oxyCODONE    oxyCODONE    promethazine (PHENERGAN) IVPB    sodium chloride 0.9%    sodium chloride 0.9%    traMADoL        Objective:     Vital Signs (Most Recent):  Temp: 98.2 °F (36.8 °C) (08/21/20 0913)  Pulse: 104 (08/21/20 0913)  Resp: 18 (08/21/20 0913)  BP: 132/73  (08/21/20 0913)  SpO2: 99 % (08/21/20 0913) Vital Signs (24h Range):  Temp:  [97.2 °F (36.2 °C)-98.2 °F (36.8 °C)] 98.2 °F (36.8 °C)  Pulse:  [] 104  Resp:  [16-19] 18  SpO2:  [94 %-99 %] 99 %  BP: (111-132)/(63-73) 132/73     Intake/Output - Last 3 Shifts       08/19 0700 - 08/20 0659 08/20 0700 - 08/21 0659 08/21 0700 - 08/22 0659    P.O. 390 625     TPN 60      Total Intake(mL/kg) 450 (6.4) 625 (8.8)     Urine (mL/kg/hr) 3100 (1.8) 3550 (2.1)     Emesis/NG output 0 0     Drains 0 20     Other 0 0     Stool 1000 600     Blood 0 0     Total Output 4100 4170     Net -5988 -2137            Urine Occurrence 2 x 0 x     Stool Occurrence 1 x 0 x     Emesis Occurrence 0 x 0 x           Physical Exam  Constitutional:       General: She is not in acute distress.     Appearance: Normal appearance.   HENT:      Head: Normocephalic and atraumatic.      Right Ear: External ear normal.      Left Ear: External ear normal.      Nose: Nose normal.      Mouth/Throat:      Mouth: Mucous membranes are moist.   Eyes:      Extraocular Movements: Extraocular movements intact.   Neck:      Musculoskeletal: Normal range of motion.   Cardiovascular:      Rate and Rhythm: Normal rate.   Pulmonary:      Effort: Pulmonary effort is normal. No respiratory distress.   Abdominal:      General: There is no distension.      Palpations: Abdomen is soft.      Comments: RLQ ostomy pink and patent with bowel contents and gas in bag. LLQ IR drain with SS output.    Musculoskeletal: Normal range of motion.   Skin:     General: Skin is dry.   Neurological:      General: No focal deficit present.      Mental Status: She is alert and oriented to person, place, and time.   Psychiatric:         Mood and Affect: Mood normal.         Behavior: Behavior normal.         Significant Labs:  CBC (Last 3 Results):   Recent Labs   Lab 08/19/20  0420 08/20/20  0423 08/21/20  0427   WBC 8.96 9.87  9.87 8.54   RBC 3.38* 3.30*  3.30* 3.37*   HGB 8.6* 8.4*  8.4*  8.4*   HCT 27.6* 27.2*  27.2* 27.2*   * 740*  740* 696*   MCV 82 82  82 81*   MCH 25.4* 25.5*  25.5* 24.9*   MCHC 31.2* 30.9*  30.9* 30.9*     CMP (Last 3 Results):   Recent Labs   Lab 08/19/20  0421 08/20/20  0423 08/21/20 0427   * 152*  152* 94   CALCIUM 9.2 9.2  9.2 9.4   ALBUMIN 1.8* 2.0*  2.0* 2.0*   PROT 8.2 9.1*  9.1* 8.4    135*  135* 136   K 4.4 4.4  4.4 4.4   CO2 23 24  24 23    102  102 102   BUN 24* 27*  27* 29*   CREATININE 0.9 0.8  0.8 0.9   ALKPHOS 200* 215*  215* 256*   ALT 15 20  20 29   AST 22 23  23 43*   BILITOT 0.2 0.2  0.2 0.3     CRP (Last 3 Results):   Recent Labs   Lab 08/19/20  0421 08/20/20  0423 08/21/20 0427   .3* 72.8*  72.8* 72.2*       Significant Diagnostics:  None

## 2020-08-21 NOTE — PLAN OF CARE
08/21/20 0802   Post-Acute Status   Post-Acute Authorization Placement   Post-Acute Placement Status Set-up Complete   Pt going to [facility] today for 10:00 AM. SW arranged stretcher transport via [facility]. Requested  time is 10:00 AM. Requested  time does not guarantee arrival time. Pt going to room 236. Nurse can call report to [phone]. Floor nurse notified.     Christa Steel LMSW  [facility]  [phone]

## 2020-08-21 NOTE — PROGRESS NOTES
[facility]  Colorectal Surgery  Progress Note    Patient Name: Azucena Morrison  MRN: [id number]  Admission Date: 7/15/2020  Hospital Length of Stay: 37 days  Attending Physician: Fabiana Valiente MD    Subjective:     Interval History: NAEON. Patient states that she is doing well this AM. Pain well controlled, no nausea this AM. Plan for discharge to LTAC this morning.     Post-Op Info:  Procedure(s) (LRB):  RESECTION, COLON, LOW ANTERIOR (N/A)  CYSTOSCOPY (N/A)  CATHETERIZATION, URETER (Bilateral)  CREATION, ILEOSTOMY  LYSIS, ADHESIONS   21 Days Post-Op      Medications:  Continuous Infusions:   dextrose 10 % in water (D10W)       Scheduled Meds:   acetaminophen  1,000 mg Oral Q6H    carvediloL  12.5 mg Oral BID    enoxaparin  40 mg Subcutaneous Q24H    gabapentin  300 mg Oral TID    hydroCHLOROthiazide  25 mg Oral Daily    ibuprofen  400 mg Oral TID    [START ON 8/22/2020] insulin aspart U-100  4 Units Subcutaneous Q24H    [START ON 8/22/2020] insulin aspart U-100  4 Units Subcutaneous Q24H    [START ON 8/22/2020] insulin aspart U-100  4 Units Subcutaneous Q24H    insulin aspart U-100  4 Units Subcutaneous Q24H    insulin aspart U-100  4 Units Subcutaneous Q24H    insulin aspart U-100  4 Units Subcutaneous Q24H    lidocaine  1 patch Transdermal Q24H    loperamide  2 mg Oral QID    meropenem (MERREM) IVPB  2 g Intravenous Q8H    metoclopramide HCl  10 mg Oral TID AC    pantoprozole (PROTONIX) IV  40 mg Intravenous Q24H    psyllium husk (aspartame)  1 packet Oral Daily    scopolamine  1 patch Transdermal Q3 Days    sodium chloride 0.9%  10 mL Intravenous Q6H     PRN Meds:   sodium chloride    calcium carbonate    dextrose 10 % in water (D10W)    dextrose 50%    glucagon (human recombinant)    HYDROmorphone    HYDROmorphone    insulin aspart U-100    iohexol    naloxone    ondansetron    ondansetron    oxyCODONE    oxyCODONE    promethazine (PHENERGAN) IVPB     sodium chloride 0.9%    sodium chloride 0.9%    traMADoL        Objective:     Vital Signs (Most Recent):  Temp: 98.2 °F (36.8 °C) (08/21/20 0913)  Pulse: 104 (08/21/20 0913)  Resp: 18 (08/21/20 0913)  BP: 132/73 (08/21/20 0913)  SpO2: 99 % (08/21/20 0913) Vital Signs (24h Range):  Temp:  [97.2 °F (36.2 °C)-98.2 °F (36.8 °C)] 98.2 °F (36.8 °C)  Pulse:  [] 104  Resp:  [16-19] 18  SpO2:  [94 %-99 %] 99 %  BP: (111-132)/(63-73) 132/73     Intake/Output - Last 3 Shifts       08/19 0700 - 08/20 0659 08/20 0700 - 08/21 0659 08/21 0700 - 08/22 0659    P.O. 390 625     TPN 60      Total Intake(mL/kg) 450 (6.4) 625 (8.8)     Urine (mL/kg/hr) 3100 (1.8) 3550 (2.1)     Emesis/NG output 0 0     Drains 0 20     Other 0 0     Stool 1000 600     Blood 0 0     Total Output 4100 4170     Net -3650 -3545            Urine Occurrence 2 x 0 x     Stool Occurrence 1 x 0 x     Emesis Occurrence 0 x 0 x           Physical Exam  Constitutional:       General: She is not in acute distress.     Appearance: Normal appearance.   HENT:      Head: Normocephalic and atraumatic.      Right Ear: External ear normal.      Left Ear: External ear normal.      Nose: Nose normal.      Mouth/Throat:      Mouth: Mucous membranes are moist.   Eyes:      Extraocular Movements: Extraocular movements intact.   Neck:      Musculoskeletal: Normal range of motion.   Cardiovascular:      Rate and Rhythm: Normal rate.   Pulmonary:      Effort: Pulmonary effort is normal. No respiratory distress.   Abdominal:      General: There is no distension.      Palpations: Abdomen is soft.      Comments: RLQ ostomy pink and patent with bowel contents and gas in bag. LLQ IR drain with SS output.    Musculoskeletal: Normal range of motion.   Skin:     General: Skin is dry.   Neurological:      General: No focal deficit present.      Mental Status: She is alert and oriented to person, place, and time.   Psychiatric:         Mood and Affect: Mood normal.         Behavior:  Behavior normal.         Significant Labs:  CBC (Last 3 Results):   Recent Labs   Lab 08/19/20  0420 08/20/20  0423 08/21/20 0427   WBC 8.96 9.87  9.87 8.54   RBC 3.38* 3.30*  3.30* 3.37*   HGB 8.6* 8.4*  8.4* 8.4*   HCT 27.6* 27.2*  27.2* 27.2*   * 740*  740* 696*   MCV 82 82  82 81*   MCH 25.4* 25.5*  25.5* 24.9*   MCHC 31.2* 30.9*  30.9* 30.9*     CMP (Last 3 Results):   Recent Labs   Lab 08/19/20 0421 08/20/20 0423 08/21/20 0427   * 152*  152* 94   CALCIUM 9.2 9.2  9.2 9.4   ALBUMIN 1.8* 2.0*  2.0* 2.0*   PROT 8.2 9.1*  9.1* 8.4    135*  135* 136   K 4.4 4.4  4.4 4.4   CO2 23 24  24 23    102  102 102   BUN 24* 27*  27* 29*   CREATININE 0.9 0.8  0.8 0.9   ALKPHOS 200* 215*  215* 256*   ALT 15 20  20 29   AST 22 23  23 43*   BILITOT 0.2 0.2  0.2 0.3     CRP (Last 3 Results):   Recent Labs   Lab 08/19/20 0421 08/20/20 0423 08/21/20 0427   .3* 72.8*  72.8* 72.2*       Significant Diagnostics:  None    Assessment/Plan:     * Cutaneous fistula  70 y.o. female 21 Days Post-Op for Procedure(s) (LRB):  RESECTION, COLON, LOW ANTERIOR (N/A)  CYSTOSCOPY (N/A)  CATHETERIZATION, URETER (Bilateral)  CREATION, ILEOSTOMY  LYSIS, ADHESIONS   - IR drain placed 8/6: cultures growing Ecoli sensitive to Ertapenem (also has a UTI with the same Ecoli). Drain upsized  By IR on 8/12  - IR drain placed 8/17 remains in place, previous drain removed  - IV antibiotics switched to meropenem by ID and patient placed on contact precautions due to ESBL. Appreciate ID recommendations, continue therapy per recommendations. Will set up patient for IV abx for discharge  - Continue diabetic diet as tolerated  - continue nausea control w scopolamine patch  - will DC TPN today  - SCDs/DVT prophylaxis  - GI prophylaxis  - Encourage IS  - PT/OT. Encourage to chair and ambulation.  - f/u with social work regarding potential discharge Friday  - imodium increased due to loose stool, improved  this AM  - to LTAC today      Continue communication with use of       On total parenteral nutrition (TPN)  DC'd 8/20/20    UTI (urinary tract infection)  Cultures growing E. Coli sensitive to ertapenem. First dose given 8/7. Infectious diseases consulted. Switched to meropenem on 8/13 by ID for ESBL E. Coli. Anticipated long course IV antibiotic treatment. Appreciate recommendations.     Acute blood loss anemia  H/H has remained stable, f/u morning labs      Chronic kidney disease, stage 3  Trend BUN/Cr  Monitor UOP    Generalized abdominal pain  Continue standing tylenol, ibuprofen, gabapentin  PCA discontinued  PO oxycodone PRN for pain control    CT w/ interval increase in size in both midline and right sided anterior abdominal fluid collections, patient remaining NPO for IR drain placement today  IR consulted    Essential hypertension  Home medications resumed  Can increase carvedilol as needed if BP still elevated    Type 2 diabetes mellitus without complication, with long-term current use of insulin  SSI  On scheduled insulin ordered by Endo team.  Accu check Q6    Endocrinology consulted. Appreciate their recommendations.           Alton Patino MD  Colorectal Surgery  [facility]

## 2020-08-21 NOTE — PLAN OF CARE
Patient will be discharged to [facility]. Transport will be set up through [facility].      08/21/20 1009   Final Note   Assessment Type Final Discharge Note   Anticipated Discharge Disposition LTAC   Discharge plans and expectations educations in teach back method with documentation complete? Yes   Right Care Referral Info   Post Acute Recommendation Other  (LTAC)   Facility Name [facility]   Post-Acute Status   Post-Acute Authorization Placement   Post-Acute Placement Status Set-up Complete     Future Appointments   Date Time Provider Department Center   8/26/2020  2:00 PM AMBREEN Irby, ANP [facility] Pavan Lynch RN, CM   Ext: [phone]

## 2020-08-21 NOTE — HOSPITAL COURSE
On 7/15/20, Patient arrives as a transfer from Dr. Horton at [facility] for treatment of colo-enteric and colo-cutaneous fistula. 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 Patient was started on TPN for nutrition. An NG tube was placed for intractable nausea and vomiting and placed to low intermittent suction. Surgical planning at this time involved long discussions surrounding the extent of her small bowel and colonic involvement in these cutaneous fistulae. Discussions with the family and patient consisted of counseling regarding potential need for significant small bowel resection which could result in life-long short gut and the need for a permanent vs temporary ostomy as well as potential need for lifelong TPN. These surgical options were laid out along with less invasive management involving PEG and TPN.     The NGT was removed on 7/23/20 after nausea and vomiting continued to improve, and the patient elected to proceed with surgery planned for 7/31/20.     On 7/24/20, patient was advanced to clear liquids with nutritional supplements along with her TPN.     On 7/31/20, patient was taken to the OR for extensive lysis of adhesions, partial colectomy with ascending colon to rectum anastomosis using Deloyer's derotation, small bowel resection, diverting loop ileostomy, and cystoscopy with bilateral ureteral catheter placement. Patient was observed in the PACU during her recovery from anesthesia. After an appropriate duration of observation, patient returned to the floor. She returned to the floor with a provena wound vac on the surgical site and ostomy appliances in place.     On 8/1/20, an NG tube was placed for intractable nausea and vomiting. Patient remained NPO at this time and continued with TPN. Patient's pain control improved with PCA and prn medication for breakthrough pain.     On 8/3/20, the NG tube was removed due to  resolving nausea and vomiting. She received 2 units pRBC due to down trending H/H and had an adequate response to transfusion.     On 8/5, patient had a Tmax of 101.1, and fever workup was initiated with COVID test, chest XR, blood cultures x2, and urine cultures.     On 8/6/20, patient had intractable nausea and vomiting. An NG tube was placed with 600 cc of brown fluid return. CT scan at this time revealed a large fluid collection in the anterior abdomen without evidence of contrast extravasation.     On 8/7/20, urine cultures began growing E.coli, patient was started on ciprofloxacin and then switched to ertapenem after sensitivities returned. Her H/H continued to downtrend, patient was transfused 2 units pRBC as well as IV ferric gluconate with appropriate response in H/H. IR drain was placed in anterior midline abdominal fluid collection with straw colored fluid return.     On 8/9/20, NG tube was removed.     On 8/12/20, CT scan revealed additional abdominal fluid collection, IR was consulted for additional drain placement vs drain upsize. IR subsequently up sized the drain to improve drainage of fluid collection. Patient was transitioned to clear liquid diet. IR drain culture from initial placement growing same E.coli as UTI, patient planned for long course of ertapenem, ID consulted.     On 8/14/20, IV antibiotics switched to meropenem following ID recommendations.    On 8/15/20, patient was transitioned to diabetic diet.     On 8/16/20, CT revealed inadequate drainage of abdominal fluid collections. Patient was scheduled for additional IR drain placement on 8/17/20.     On 8/17/20, additional IR drain placed with 8+20 straw colored fluid aspirated from collection.     On 8/19/20, initial IR drain was removed. Patient was progressed to low fiber/residue diet. Imodium was added to patient medications for high output ostomy.    On 8/20/20, TPN was discontinued.     On 8/21/20, patient was discharged to LTAC  facility.

## 2020-08-23 PROBLEM — E44.0 MALNUTRITION OF MODERATE DEGREE: Status: ACTIVE | Noted: 2020-08-23

## 2020-09-03 PROBLEM — K65.1 INTRA-ABDOMINAL ABSCESS: Status: ACTIVE | Noted: 2020-09-03

## 2020-09-08 PROBLEM — B35.1 ONYCHOMYCOSIS: Status: ACTIVE | Noted: 2020-09-08

## 2020-09-09 LAB — FUNGUS SPEC CULT: NORMAL

## 2020-09-16 PROBLEM — N39.0 UTI (URINARY TRACT INFECTION): Status: RESOLVED | Noted: 2020-08-06 | Resolved: 2020-09-16

## 2020-09-16 PROBLEM — D62 ACUTE BLOOD LOSS ANEMIA: Status: RESOLVED | Noted: 2020-08-03 | Resolved: 2020-09-16

## 2020-09-16 PROBLEM — K56.609 SBO (SMALL BOWEL OBSTRUCTION): Status: RESOLVED | Noted: 2020-01-19 | Resolved: 2020-09-16

## 2020-09-16 PROBLEM — K56.600 PARTIAL OBSTRUCTION OF SMALL INTESTINE: Status: RESOLVED | Noted: 2019-01-24 | Resolved: 2020-09-16

## 2020-09-16 PROBLEM — R11.0 NAUSEA: Status: RESOLVED | Noted: 2018-11-21 | Resolved: 2020-09-16

## 2020-09-16 PROBLEM — E87.20 LACTIC ACID INCREASED: Status: RESOLVED | Noted: 2020-02-02 | Resolved: 2020-09-16

## 2020-09-16 PROBLEM — E87.6 HYPOKALEMIA DUE TO EXCESSIVE GASTROINTESTINAL LOSS OF POTASSIUM: Status: RESOLVED | Noted: 2020-07-08 | Resolved: 2020-09-16

## 2020-09-16 LAB — FUNGUS SPEC CULT: NORMAL

## 2020-09-18 DIAGNOSIS — L02.211 ABSCESS OF ABDOMINAL WALL: Primary | ICD-10-CM

## 2020-09-20 PROCEDURE — G0180 PR HOME HEALTH MD CERTIFICATION: ICD-10-PCS | Mod: ,,, | Performed by: HOSPITALIST

## 2020-09-20 PROCEDURE — G0180 MD CERTIFICATION HHA PATIENT: HCPCS | Mod: ,,, | Performed by: HOSPITALIST

## 2020-09-25 ENCOUNTER — TELEPHONE (OUTPATIENT)
Dept: INFECTIOUS DISEASES | Facility: CLINIC | Age: 70
End: 2020-09-25

## 2020-09-27 ENCOUNTER — LAB VISIT (OUTPATIENT)
Dept: LAB | Facility: HOSPITAL | Age: 70
End: 2020-09-27
Attending: INTERNAL MEDICINE
Payer: MEDICARE

## 2020-09-27 DIAGNOSIS — R79.89 ABNORMAL CBC: ICD-10-CM

## 2020-09-27 DIAGNOSIS — T81.43XA POSTOPERATIVE INTRA-ABDOMINAL ABSCESS: Primary | ICD-10-CM

## 2020-09-27 LAB
ALBUMIN SERPL BCP-MCNC: 2.8 G/DL (ref 3.5–5.2)
ALP SERPL-CCNC: 110 U/L (ref 55–135)
ALT SERPL W/O P-5'-P-CCNC: 48 U/L (ref 10–44)
ANION GAP SERPL CALC-SCNC: 10 MMOL/L (ref 8–16)
AST SERPL-CCNC: 27 U/L (ref 10–40)
BILIRUB SERPL-MCNC: 0.3 MG/DL (ref 0.1–1)
BUN SERPL-MCNC: 12 MG/DL (ref 8–23)
CALCIUM SERPL-MCNC: 8.9 MG/DL (ref 8.7–10.5)
CHLORIDE SERPL-SCNC: 105 MMOL/L (ref 95–110)
CO2 SERPL-SCNC: 25 MMOL/L (ref 23–29)
CREAT SERPL-MCNC: 0.9 MG/DL (ref 0.5–1.4)
CRP SERPL-MCNC: 15.9 MG/L (ref 0–8.2)
ERYTHROCYTE [DISTWIDTH] IN BLOOD BY AUTOMATED COUNT: 17.9 % (ref 11.5–14.5)
ERYTHROCYTE [SEDIMENTATION RATE] IN BLOOD BY WESTERGREN METHOD: 70 MM/HR (ref 0–36)
EST. GFR  (AFRICAN AMERICAN): >60 ML/MIN/1.73 M^2
EST. GFR  (NON AFRICAN AMERICAN): >60 ML/MIN/1.73 M^2
GLUCOSE SERPL-MCNC: 76 MG/DL (ref 70–110)
HCT VFR BLD AUTO: 30.7 % (ref 37–48.5)
HGB BLD-MCNC: 10 G/DL (ref 12–16)
MCH RBC QN AUTO: 26.1 PG (ref 27–31)
MCHC RBC AUTO-ENTMCNC: 32.6 G/DL (ref 32–36)
MCV RBC AUTO: 80 FL (ref 82–98)
PLATELET # BLD AUTO: 343 K/UL (ref 150–350)
PMV BLD AUTO: 9 FL (ref 9.2–12.9)
POTASSIUM SERPL-SCNC: 3 MMOL/L (ref 3.5–5.1)
PROT SERPL-MCNC: 7.4 G/DL (ref 6–8.4)
RBC # BLD AUTO: 3.83 M/UL (ref 4–5.4)
SODIUM SERPL-SCNC: 140 MMOL/L (ref 136–145)
WBC # BLD AUTO: 7.67 K/UL (ref 3.9–12.7)

## 2020-09-27 PROCEDURE — 85652 RBC SED RATE AUTOMATED: CPT

## 2020-09-27 PROCEDURE — 80053 COMPREHEN METABOLIC PANEL: CPT

## 2020-09-27 PROCEDURE — 85027 COMPLETE CBC AUTOMATED: CPT

## 2020-09-27 PROCEDURE — 86140 C-REACTIVE PROTEIN: CPT

## 2020-09-30 ENCOUNTER — DOCUMENT SCAN (OUTPATIENT)
Dept: HOME HEALTH SERVICES | Facility: HOSPITAL | Age: 70
End: 2020-09-30
Payer: MEDICARE

## 2020-10-01 ENCOUNTER — LAB VISIT (OUTPATIENT)
Dept: LAB | Facility: HOSPITAL | Age: 70
End: 2020-10-01
Attending: INTERNAL MEDICINE
Payer: MEDICARE

## 2020-10-01 ENCOUNTER — EXTERNAL HOME HEALTH (OUTPATIENT)
Dept: HOME HEALTH SERVICES | Facility: HOSPITAL | Age: 70
End: 2020-10-01
Payer: MEDICARE

## 2020-10-01 DIAGNOSIS — T81.49XA POSTOPERATIVE SUBPHRENIC ABSCESS: Primary | ICD-10-CM

## 2020-10-01 LAB
ALBUMIN SERPL BCP-MCNC: 2.6 G/DL (ref 3.5–5.2)
ALP SERPL-CCNC: 98 U/L (ref 55–135)
ALT SERPL W/O P-5'-P-CCNC: 42 U/L (ref 10–44)
ANION GAP SERPL CALC-SCNC: 10 MMOL/L (ref 8–16)
AST SERPL-CCNC: 29 U/L (ref 10–40)
BILIRUB SERPL-MCNC: 0.3 MG/DL (ref 0.1–1)
BUN SERPL-MCNC: 14 MG/DL (ref 8–23)
CALCIUM SERPL-MCNC: 8 MG/DL (ref 8.7–10.5)
CHLORIDE SERPL-SCNC: 107 MMOL/L (ref 95–110)
CO2 SERPL-SCNC: 23 MMOL/L (ref 23–29)
CREAT SERPL-MCNC: 0.8 MG/DL (ref 0.5–1.4)
CRP SERPL-MCNC: 21.2 MG/L (ref 0–8.2)
ERYTHROCYTE [DISTWIDTH] IN BLOOD BY AUTOMATED COUNT: 17.6 % (ref 11.5–14.5)
ERYTHROCYTE [SEDIMENTATION RATE] IN BLOOD BY WESTERGREN METHOD: 53 MM/HR (ref 0–36)
EST. GFR  (AFRICAN AMERICAN): >60 ML/MIN/1.73 M^2
EST. GFR  (NON AFRICAN AMERICAN): >60 ML/MIN/1.73 M^2
GLUCOSE SERPL-MCNC: 100 MG/DL (ref 70–110)
HCT VFR BLD AUTO: 29.5 % (ref 37–48.5)
HGB BLD-MCNC: 9.5 G/DL (ref 12–16)
MCH RBC QN AUTO: 26.4 PG (ref 27–31)
MCHC RBC AUTO-ENTMCNC: 32.2 G/DL (ref 32–36)
MCV RBC AUTO: 82 FL (ref 82–98)
PLATELET # BLD AUTO: 262 K/UL (ref 150–350)
PMV BLD AUTO: 9.1 FL (ref 9.2–12.9)
POTASSIUM SERPL-SCNC: 2.6 MMOL/L (ref 3.5–5.1)
PROT SERPL-MCNC: 7.2 G/DL (ref 6–8.4)
RBC # BLD AUTO: 3.6 M/UL (ref 4–5.4)
SODIUM SERPL-SCNC: 140 MMOL/L (ref 136–145)
WBC # BLD AUTO: 5.55 K/UL (ref 3.9–12.7)

## 2020-10-01 PROCEDURE — 85027 COMPLETE CBC AUTOMATED: CPT

## 2020-10-01 PROCEDURE — 86140 C-REACTIVE PROTEIN: CPT

## 2020-10-01 PROCEDURE — 80053 COMPREHEN METABOLIC PANEL: CPT

## 2020-10-01 PROCEDURE — 85652 RBC SED RATE AUTOMATED: CPT

## 2020-10-02 ENCOUNTER — HOSPITAL ENCOUNTER (EMERGENCY)
Facility: HOSPITAL | Age: 70
Discharge: HOME | End: 2020-10-02
Attending: EMERGENCY MEDICINE
Payer: MEDICARE

## 2020-10-02 ENCOUNTER — TELEPHONE (OUTPATIENT)
Dept: INFECTIOUS DISEASES | Facility: CLINIC | Age: 70
End: 2020-10-02

## 2020-10-02 ENCOUNTER — DOCUMENT SCAN (OUTPATIENT)
Dept: HOME HEALTH SERVICES | Facility: HOSPITAL | Age: 70
End: 2020-10-02
Payer: MEDICARE

## 2020-10-02 VITALS
HEIGHT: 61 IN | DIASTOLIC BLOOD PRESSURE: 73 MMHG | SYSTOLIC BLOOD PRESSURE: 153 MMHG | WEIGHT: 149 LBS | OXYGEN SATURATION: 99 % | HEART RATE: 84 BPM | RESPIRATION RATE: 20 BRPM | TEMPERATURE: 99 F | BODY MASS INDEX: 28.13 KG/M2

## 2020-10-02 DIAGNOSIS — E87.6 HYPOKALEMIA: ICD-10-CM

## 2020-10-02 LAB
ALBUMIN SERPL BCP-MCNC: 3.1 G/DL (ref 3.5–5.2)
ALP SERPL-CCNC: 114 U/L (ref 55–135)
ALT SERPL W/O P-5'-P-CCNC: 51 U/L (ref 10–44)
ANION GAP SERPL CALC-SCNC: 11 MMOL/L (ref 8–16)
AST SERPL-CCNC: 37 U/L (ref 10–40)
BASOPHILS # BLD AUTO: 0.02 K/UL (ref 0–0.2)
BASOPHILS NFR BLD: 0.3 % (ref 0–1.9)
BILIRUB SERPL-MCNC: 0.2 MG/DL (ref 0.1–1)
BUN SERPL-MCNC: 13 MG/DL (ref 8–23)
CALCIUM SERPL-MCNC: 8.9 MG/DL (ref 8.7–10.5)
CHLORIDE SERPL-SCNC: 106 MMOL/L (ref 95–110)
CO2 SERPL-SCNC: 23 MMOL/L (ref 23–29)
CREAT SERPL-MCNC: 1 MG/DL (ref 0.5–1.4)
DIFFERENTIAL METHOD: ABNORMAL
EOSINOPHIL # BLD AUTO: 0.1 K/UL (ref 0–0.5)
EOSINOPHIL NFR BLD: 1.9 % (ref 0–8)
ERYTHROCYTE [DISTWIDTH] IN BLOOD BY AUTOMATED COUNT: 17 % (ref 11.5–14.5)
EST. GFR  (AFRICAN AMERICAN): >60 ML/MIN/1.73 M^2
EST. GFR  (NON AFRICAN AMERICAN): 58 ML/MIN/1.73 M^2
GLUCOSE SERPL-MCNC: 118 MG/DL (ref 70–110)
HCT VFR BLD AUTO: 31.3 % (ref 37–48.5)
HGB BLD-MCNC: 10.1 G/DL (ref 12–16)
IMM GRANULOCYTES # BLD AUTO: 0.01 K/UL (ref 0–0.04)
IMM GRANULOCYTES NFR BLD AUTO: 0.2 % (ref 0–0.5)
LYMPHOCYTES # BLD AUTO: 1.6 K/UL (ref 1–4.8)
LYMPHOCYTES NFR BLD: 25.8 % (ref 18–48)
MCH RBC QN AUTO: 26 PG (ref 27–31)
MCHC RBC AUTO-ENTMCNC: 32.3 G/DL (ref 32–36)
MCV RBC AUTO: 81 FL (ref 82–98)
MONOCYTES # BLD AUTO: 0.4 K/UL (ref 0.3–1)
MONOCYTES NFR BLD: 6.4 % (ref 4–15)
NEUTROPHILS # BLD AUTO: 4.1 K/UL (ref 1.8–7.7)
NEUTROPHILS NFR BLD: 65.4 % (ref 38–73)
NRBC BLD-RTO: 0 /100 WBC
PLATELET # BLD AUTO: 315 K/UL (ref 150–350)
PMV BLD AUTO: 8.5 FL (ref 9.2–12.9)
POTASSIUM SERPL-SCNC: 3.2 MMOL/L (ref 3.5–5.1)
PROT SERPL-MCNC: 8 G/DL (ref 6–8.4)
RBC # BLD AUTO: 3.89 M/UL (ref 4–5.4)
SODIUM SERPL-SCNC: 140 MMOL/L (ref 136–145)
WBC # BLD AUTO: 6.29 K/UL (ref 3.9–12.7)

## 2020-10-02 PROCEDURE — 85025 COMPLETE CBC W/AUTO DIFF WBC: CPT

## 2020-10-02 PROCEDURE — 25000003 PHARM REV CODE 250: Performed by: EMERGENCY MEDICINE

## 2020-10-02 PROCEDURE — 99284 EMERGENCY DEPT VISIT MOD MDM: CPT | Mod: 25

## 2020-10-02 PROCEDURE — 80053 COMPREHEN METABOLIC PANEL: CPT

## 2020-10-02 PROCEDURE — 93005 ELECTROCARDIOGRAM TRACING: CPT

## 2020-10-02 RX ORDER — POTASSIUM CHLORIDE 20 MEQ/1
40 TABLET, EXTENDED RELEASE ORAL
Status: COMPLETED | OUTPATIENT
Start: 2020-10-02 | End: 2020-10-02

## 2020-10-02 RX ADMIN — POTASSIUM CHLORIDE 40 MEQ: 1500 TABLET, EXTENDED RELEASE ORAL at 03:10

## 2020-10-02 NOTE — TELEPHONE ENCOUNTER
Josh with [facility] called. He will send the pt to the ED, for a critical potassium level of 2.6

## 2020-10-02 NOTE — ED PROVIDER NOTES
Encounter Date: 10/2/2020    SCRIBE #1 NOTE: I, Danielle Harper, am scribing for, and in the presence of,  Robbie Cruz MD. I have scribed the entire note.       History     Chief Complaint   Patient presents with    Abnormal Lab     sent by Dr. Sanches for hypokalemia when labs were drawn from her [facility] PICC line yesterday.      Azucena Morrison is a 70 y.o. female who  has a past medical history of Chronic kidney disease, stage 3, Diabetes mellitus, Diabetes mellitus, type 2, Hypertension, Malnutrition of moderate degree (2020), and UTI (urinary tract infection) (2020).    The patient referred to ED by Dr. Sanches (PCP) for hypokalemia (2.6 mM) noted from labs drawn yesterday. The patient denies any leg pain, N/V/, or any other concerning symptoms.     The history is provided by the patient.     Review of patient's allergies indicates:   Allergen Reactions    Chlorhexidine gluconate Rash     Chlorhexidine/alcohol wipes. Rash and blistering.     Past Medical History:   Diagnosis Date    Chronic kidney disease, stage 3     Diabetes mellitus     Diabetes mellitus, type 2     Hypertension     Malnutrition of moderate degree 2020    UTI (urinary tract infection) 2020     Past Surgical History:   Procedure Laterality Date    CATHETERIZATION OF URETER Bilateral 2020    Procedure: CATHETERIZATION, URETER;  Surgeon: Kvng Cunningham MD;  Location: [address];  Service: Urology;  Laterality: Bilateral;     SECTION, CLASSIC      x2    CHOLECYSTECTOMY      COLON SURGERY      [facility]    COLONOSCOPY N/A 2018    Procedure: COLONOSCOPY;  Surgeon: Gibran Aguayo MD;  Location: [facility];  Service: Endoscopy;  Laterality: N/A;    COLOSTOMY Left     CYSTOSCOPY N/A 2020    Procedure: CYSTOSCOPY;  Surgeon: Kvng Cunningham MD;  Location: [address];  Service: Urology;  Laterality: N/A;    CYSTOSCOPY WITH URETEROSCOPY, RETROGRADE PYELOGRAPHY, AND  INSERTION OF STENT Left 2019    Procedure: CYSTOSCOPY, WITH RETROGRADE PYELOGRAM AND URETERAL STENT INSERTION with placement of a muir cath;  Surgeon: Ulisses Horton MD;  Location: [facility] OR;  Service: General;  Laterality: Left;    ILEOSTOMY  2020    Procedure: CREATION, ILEOSTOMY;  Surgeon: Fabiana Valiente MD;  Location: [facility] 2ND FLR;  Service: Colon and Rectal;;    INCISION AND DRAINAGE OF ABSCESS N/A 2019    Procedure: INCISION AND DRAINAGE, ABSCESS;  Surgeon: Ulisses Horton MD;  Location: [facility] OR;  Service: General;  Laterality: N/A;    INCISION OF ABDOMINAL WALL N/A 8/10/2018    Procedure: INCISION, ABDOMINAL WALL;  Surgeon: Ulisses Horton MD;  Location: [facility] OR;  Service: General;  Laterality: N/A;    INCISIONAL HERNIA REPAIR Right 2010    LOW ANTERIOR RESECTION OF COLON N/A 2020    Procedure: RESECTION, COLON, LOW ANTERIOR;  Surgeon: Fabiana Valiente MD;  Location: [facility] 2ND F[facility];  Service: Colon and Rectal;  Laterality: N/A;    LYSIS OF ADHESIONS  2020    Procedure: LYSIS, ADHESIONS;  Surgeon: Fabiana Valiente MD;  Location: [facility] 2ND FLR;  Service: Colon and Rectal;;     Family History   Problem Relation Age of Onset    Stroke Mother     Heart disease Mother     Hyperlipidemia Mother     Hypertension Mother     Alcohol abuse Father     Stroke Sister     Diabetes Sister     Heart disease Sister     Hyperlipidemia Sister     Hypertension Sister     Diabetes Brother     Early death Grandchild      Social History     Tobacco Use    Smoking status: Former Smoker     Types: Cigarettes     Quit date: 2000     Years since quittin.7    Smokeless tobacco: Never Used   Substance Use Topics    Alcohol use: No    Drug use: No     Review of Systems   Constitutional:        Abnormal labs   Gastrointestinal: Negative for nausea and vomiting.   Musculoskeletal: Negative for myalgias.   All other systems reviewed and are  negative.      Physical Exam     Initial Vitals [10/02/20 1201]   BP Pulse Resp Temp SpO2   136/79 81 20 99.2 °F (37.3 °C) 99 %      MAP       --         Physical Exam    Nursing note and vitals reviewed.  Constitutional: She appears well-developed and well-nourished. She is not diaphoretic. No distress.   HENT:   Head: Normocephalic and atraumatic.   Right Ear: Tympanic membrane normal.   Left Ear: Tympanic membrane normal.   Mouth/Throat: Oropharynx is clear and moist.   Eyes: Conjunctivae and EOM are normal. Pupils are equal, round, and reactive to light.   Neck: Normal range of motion. Neck supple.   Cardiovascular: Normal rate, regular rhythm and normal heart sounds. Exam reveals no gallop and no friction rub.    No murmur heard.  Pulmonary/Chest: Breath sounds normal. She has no wheezes. She has no rhonchi. She has no rales.   Abdominal: Soft. Bowel sounds are normal. There is no abdominal tenderness. There is no rebound and no guarding.   Colostomy bag in place at RLQ. No blood in stool noted.   Musculoskeletal: Normal range of motion. No tenderness or edema (no LE edema).   Lymphadenopathy:     She has no cervical adenopathy.   Neurological: She is alert and oriented to person, place, and time. She has normal strength.   Skin: Skin is warm and dry. Capillary refill takes less than 2 seconds. No rash noted.         ED Course   Procedures  Labs Reviewed   CBC W/ AUTO DIFFERENTIAL - Abnormal; Notable for the following components:       Result Value    RBC 3.89 (*)     Hemoglobin 10.1 (*)     Hematocrit 31.3 (*)     Mean Corpuscular Volume 81 (*)     Mean Corpuscular Hemoglobin 26.0 (*)     RDW 17.0 (*)     MPV 8.5 (*)     All other components within normal limits   COMPREHENSIVE METABOLIC PANEL - Abnormal; Notable for the following components:    Potassium 3.2 (*)     Glucose 118 (*)     Albumin 3.1 (*)     ALT 51 (*)     eGFR if non  58 (*)     All other components within normal limits           Imaging Results    None          Medical Decision Making:   Clinical Tests:   Lab Tests: Reviewed and Ordered  ED Management:  70-year-old female sent to the ED after her potassium was found to be 2.6 yesterday.  Repeat lab work today shows a potassium of 3.2.  She has good renal function.  Patient was orally supplemented here in the ED with potassium.  She has no complaints.  I feel the patient may be safely discharged to home with follow-up.                             Clinical Impression:       ICD-10-CM ICD-9-CM   1. Hypokalemia  E87.6 276.8                                        I, Dr. Robbie Cruz, personally performed the services described in this documentation. All medical record entries made by the scribe were at my direction and in my presence. I have reviewed the chart and agree that the record reflects my personal performance and is accurate and complete. Robbie Cruz MD.  6:17 AM 10/03/2020           Robbie Cruz MD  10/03/20 0619

## 2020-10-02 NOTE — FIRST PROVIDER EVALUATION
Emergency Department TeleTriage Encounter Note      CHIEF COMPLAINT    Chief Complaint   Patient presents with    Abnormal Lab     sent by Dr. Sanches for hypokalemia when labs were drawn from her CHAZ PICC line yesterday.        VITAL SIGNS   Initial Vitals [10/02/20 1201]   BP Pulse Resp Temp SpO2   136/79 81 20 99.2 °F (37.3 °C) 99 %      MAP       --            ALLERGIES    Review of patient's allergies indicates:   Allergen Reactions    Chlorhexidine gluconate Rash     Chlorhexidine/alcohol wipes. Rash and blistering.       PROVIDER TRIAGE NOTE  This is a teletriage evaluation of a 70 y.o. female presenting to the ED with c/o abnormal labs from PCP. K 2.6 on labs done yesterday patient. Does not speak English. Unable to complete tele triage.. Initial orders will be placed and care will be transferred to an alternate provider when patient is roomed for a full evaluation. Any additional orders and the final disposition will be determined by that provider.         ORDERS  Labs Reviewed - No data to display    ED Orders (720h ago, onward)    None            Virtual Visit Note: The provider triage portion of this emergency department evaluation and documentation was performed via [employer], a HIPAA-compliant telemedicine application, in concert with a tele-presenter in the room. A face to face patient evaluation with one of my colleagues will occur once the patient is placed in an emergency department room.      DISCLAIMER: This note was prepared with [employer] voice recognition transcription software. Garbled syntax, mangled pronouns, and other bizarre constructions may be attributed to that software system.

## 2020-10-05 ENCOUNTER — LAB VISIT (OUTPATIENT)
Dept: LAB | Facility: HOSPITAL | Age: 70
End: 2020-10-05
Attending: INTERNAL MEDICINE
Payer: MEDICARE

## 2020-10-05 DIAGNOSIS — B96.1 BACTEREMIA DUE TO KLEBSIELLA PNEUMONIAE: ICD-10-CM

## 2020-10-05 DIAGNOSIS — N18.30 CHRONIC KIDNEY DISEASE, STAGE III (MODERATE): Primary | ICD-10-CM

## 2020-10-05 DIAGNOSIS — R78.81 BACTEREMIA DUE TO KLEBSIELLA PNEUMONIAE: ICD-10-CM

## 2020-10-05 LAB
BASOPHILS # BLD AUTO: 0.04 K/UL (ref 0–0.2)
BASOPHILS NFR BLD: 0.6 % (ref 0–1.9)
DIFFERENTIAL METHOD: ABNORMAL
EOSINOPHIL # BLD AUTO: 0.1 K/UL (ref 0–0.5)
EOSINOPHIL NFR BLD: 2 % (ref 0–8)
ERYTHROCYTE [DISTWIDTH] IN BLOOD BY AUTOMATED COUNT: 17.8 % (ref 11.5–14.5)
ERYTHROCYTE [SEDIMENTATION RATE] IN BLOOD BY WESTERGREN METHOD: 80 MM/HR (ref 0–36)
HCT VFR BLD AUTO: 34.2 % (ref 37–48.5)
HGB BLD-MCNC: 10.8 G/DL (ref 12–16)
IMM GRANULOCYTES # BLD AUTO: 0.02 K/UL (ref 0–0.04)
IMM GRANULOCYTES NFR BLD AUTO: 0.3 % (ref 0–0.5)
LYMPHOCYTES # BLD AUTO: 2 K/UL (ref 1–4.8)
LYMPHOCYTES NFR BLD: 30.6 % (ref 18–48)
MCH RBC QN AUTO: 26 PG (ref 27–31)
MCHC RBC AUTO-ENTMCNC: 31.6 G/DL (ref 32–36)
MCV RBC AUTO: 82 FL (ref 82–98)
MONOCYTES # BLD AUTO: 0.4 K/UL (ref 0.3–1)
MONOCYTES NFR BLD: 6.6 % (ref 4–15)
NEUTROPHILS # BLD AUTO: 3.9 K/UL (ref 1.8–7.7)
NEUTROPHILS NFR BLD: 59.9 % (ref 38–73)
NRBC BLD-RTO: 0 /100 WBC
PLATELET # BLD AUTO: 449 K/UL (ref 150–350)
PMV BLD AUTO: 9 FL (ref 9.2–12.9)
RBC # BLD AUTO: 4.15 M/UL (ref 4–5.4)
WBC # BLD AUTO: 6.53 K/UL (ref 3.9–12.7)

## 2020-10-05 PROCEDURE — 80053 COMPREHEN METABOLIC PANEL: CPT

## 2020-10-05 PROCEDURE — 85652 RBC SED RATE AUTOMATED: CPT

## 2020-10-05 PROCEDURE — 86140 C-REACTIVE PROTEIN: CPT

## 2020-10-05 PROCEDURE — 85025 COMPLETE CBC W/AUTO DIFF WBC: CPT

## 2020-10-06 ENCOUNTER — TELEPHONE (OUTPATIENT)
Dept: INFECTIOUS DISEASES | Facility: CLINIC | Age: 70
End: 2020-10-06

## 2020-10-06 ENCOUNTER — TELEPHONE (OUTPATIENT)
Dept: SURGERY | Facility: CLINIC | Age: 70
End: 2020-10-06

## 2020-10-06 DIAGNOSIS — E87.6 HYPOKALEMIA: Primary | ICD-10-CM

## 2020-10-06 DIAGNOSIS — E87.6 HYPOKALEMIA: ICD-10-CM

## 2020-10-06 LAB
ALBUMIN SERPL BCP-MCNC: 3.4 G/DL (ref 3.5–5.2)
ALP SERPL-CCNC: 110 U/L (ref 55–135)
ALT SERPL W/O P-5'-P-CCNC: 29 U/L (ref 10–44)
ANION GAP SERPL CALC-SCNC: 15 MMOL/L (ref 8–16)
AST SERPL-CCNC: 23 U/L (ref 10–40)
BILIRUB SERPL-MCNC: 0.4 MG/DL (ref 0.1–1)
BUN SERPL-MCNC: 16 MG/DL (ref 8–23)
CALCIUM SERPL-MCNC: 9.6 MG/DL (ref 8.7–10.5)
CHLORIDE SERPL-SCNC: 104 MMOL/L (ref 95–110)
CO2 SERPL-SCNC: 21 MMOL/L (ref 23–29)
CREAT SERPL-MCNC: 1.1 MG/DL (ref 0.5–1.4)
CRP SERPL-MCNC: 7.3 MG/L (ref 0–8.2)
EST. GFR  (AFRICAN AMERICAN): 59.2 ML/MIN/1.73 M^2
EST. GFR  (NON AFRICAN AMERICAN): 51.3 ML/MIN/1.73 M^2
GLUCOSE SERPL-MCNC: 138 MG/DL (ref 70–110)
POTASSIUM SERPL-SCNC: 2.8 MMOL/L (ref 3.5–5.1)
PROT SERPL-MCNC: 8.5 G/DL (ref 6–8.4)
SODIUM SERPL-SCNC: 140 MMOL/L (ref 136–145)

## 2020-10-06 RX ORDER — POTASSIUM CHLORIDE 20 MEQ/1
20 TABLET, EXTENDED RELEASE ORAL DAILY
Qty: 14 TABLET | Refills: 0 | Status: ON HOLD | OUTPATIENT
Start: 2020-10-06 | End: 2021-02-09

## 2020-10-06 RX ORDER — POTASSIUM CHLORIDE 20 MEQ/1
20 TABLET, EXTENDED RELEASE ORAL DAILY
Qty: 14 TABLET | Refills: 0 | Status: SHIPPED | OUTPATIENT
Start: 2020-10-06 | End: 2020-10-06 | Stop reason: SDUPTHER

## 2020-10-06 NOTE — TELEPHONE ENCOUNTER
Called patient and explained medication was sent in and instructed on how to take. Patient stated would like office to call daughter (emergency contact) to explain to her because she did really understand. Called patient daughter with no answer left voicemail for her to return call.

## 2020-10-06 NOTE — TELEPHONE ENCOUNTER
Spoke with patient and patient's son. Appointment made, will mail appointment and e mail appointment to [email or web address].

## 2020-10-06 NOTE — TELEPHONE ENCOUNTER
----- Message from Lyle Gusman sent at 10/6/2020  8:55 AM CDT -----  Contact: pt (daughter)  Calling to speak with Yani Stevenson daughter was returning the call     Call back: [phone]

## 2020-10-06 NOTE — TELEPHONE ENCOUNTER
Potassium replacement ordered - sent to [facility] pharmacy  Instruct patient to take 2 tabs today, then take 1 tab daily until follow up  Will increase lab draws to twice weekly given recurrent hypokalemia    Katalina Martinez DO  Transplant Infectious Disease

## 2020-10-06 NOTE — TELEPHONE ENCOUNTER
Called patient daughter back and instructed her on how mother should take medication (two pills first day and one pill daily after) and informed her will be getting labs twice a week. She also informed would like rx send into [facility] instead of [facility]. Informed will change.

## 2020-10-07 ENCOUNTER — DOCUMENT SCAN (OUTPATIENT)
Dept: HOME HEALTH SERVICES | Facility: HOSPITAL | Age: 70
End: 2020-10-07
Payer: MEDICARE

## 2020-10-08 ENCOUNTER — HOSPITAL ENCOUNTER (EMERGENCY)
Facility: HOSPITAL | Age: 70
Discharge: HOME | End: 2020-10-08
Attending: EMERGENCY MEDICINE
Payer: MEDICARE

## 2020-10-08 ENCOUNTER — DOCUMENT SCAN (OUTPATIENT)
Dept: HOME HEALTH SERVICES | Facility: HOSPITAL | Age: 70
End: 2020-10-08
Payer: MEDICARE

## 2020-10-08 VITALS
BODY MASS INDEX: 28.32 KG/M2 | WEIGHT: 150 LBS | DIASTOLIC BLOOD PRESSURE: 88 MMHG | SYSTOLIC BLOOD PRESSURE: 147 MMHG | RESPIRATION RATE: 17 BRPM | HEART RATE: 74 BPM | OXYGEN SATURATION: 100 % | TEMPERATURE: 99 F | HEIGHT: 61 IN

## 2020-10-08 DIAGNOSIS — N93.8 DYSFUNCTIONAL UTERINE BLEEDING: ICD-10-CM

## 2020-10-08 DIAGNOSIS — N93.9 VAGINAL BLEEDING: Primary | ICD-10-CM

## 2020-10-08 LAB
ABO + RH BLD: NORMAL
ALBUMIN SERPL BCP-MCNC: 3 G/DL (ref 3.5–5.2)
ALP SERPL-CCNC: 104 U/L (ref 55–135)
ALT SERPL W/O P-5'-P-CCNC: 26 U/L (ref 10–44)
ANION GAP SERPL CALC-SCNC: 10 MMOL/L (ref 8–16)
AST SERPL-CCNC: 26 U/L (ref 10–40)
B-HCG UR QL: NEGATIVE
BASOPHILS # BLD AUTO: 0.03 K/UL (ref 0–0.2)
BASOPHILS NFR BLD: 0.5 % (ref 0–1.9)
BILIRUB SERPL-MCNC: 0.3 MG/DL (ref 0.1–1)
BUN SERPL-MCNC: 13 MG/DL (ref 8–23)
CALCIUM SERPL-MCNC: 8.7 MG/DL (ref 8.7–10.5)
CHLORIDE SERPL-SCNC: 108 MMOL/L (ref 95–110)
CO2 SERPL-SCNC: 20 MMOL/L (ref 23–29)
CREAT SERPL-MCNC: 0.9 MG/DL (ref 0.5–1.4)
CTP QC/QA: YES
DIFFERENTIAL METHOD: ABNORMAL
EOSINOPHIL # BLD AUTO: 0.4 K/UL (ref 0–0.5)
EOSINOPHIL NFR BLD: 5.6 % (ref 0–8)
ERYTHROCYTE [DISTWIDTH] IN BLOOD BY AUTOMATED COUNT: 17.3 % (ref 11.5–14.5)
EST. GFR  (AFRICAN AMERICAN): >60 ML/MIN/1.73 M^2
EST. GFR  (NON AFRICAN AMERICAN): >60 ML/MIN/1.73 M^2
GLUCOSE SERPL-MCNC: 114 MG/DL (ref 70–110)
HCG INTACT+B SERPL-ACNC: <1.2 MIU/ML
HCT VFR BLD AUTO: 30.1 % (ref 37–48.5)
HGB BLD-MCNC: 10 G/DL (ref 12–16)
IMM GRANULOCYTES # BLD AUTO: 0.02 K/UL (ref 0–0.04)
IMM GRANULOCYTES NFR BLD AUTO: 0.3 % (ref 0–0.5)
LYMPHOCYTES # BLD AUTO: 1.7 K/UL (ref 1–4.8)
LYMPHOCYTES NFR BLD: 26.9 % (ref 18–48)
MCH RBC QN AUTO: 26.9 PG (ref 27–31)
MCHC RBC AUTO-ENTMCNC: 33.2 G/DL (ref 32–36)
MCV RBC AUTO: 81 FL (ref 82–98)
MONOCYTES # BLD AUTO: 0.4 K/UL (ref 0.3–1)
MONOCYTES NFR BLD: 5.8 % (ref 4–15)
NEUTROPHILS # BLD AUTO: 3.9 K/UL (ref 1.8–7.7)
NEUTROPHILS NFR BLD: 60.9 % (ref 38–73)
NRBC BLD-RTO: 0 /100 WBC
PLATELET # BLD AUTO: 355 K/UL (ref 150–350)
PMV BLD AUTO: 8.1 FL (ref 9.2–12.9)
POTASSIUM SERPL-SCNC: 3.7 MMOL/L (ref 3.5–5.1)
PROT SERPL-MCNC: 7.5 G/DL (ref 6–8.4)
RBC # BLD AUTO: 3.72 M/UL (ref 4–5.4)
SODIUM SERPL-SCNC: 138 MMOL/L (ref 136–145)
WBC # BLD AUTO: 6.43 K/UL (ref 3.9–12.7)

## 2020-10-08 PROCEDURE — 84702 CHORIONIC GONADOTROPIN TEST: CPT

## 2020-10-08 PROCEDURE — 80053 COMPREHEN METABOLIC PANEL: CPT

## 2020-10-08 PROCEDURE — 86901 BLOOD TYPING SEROLOGIC RH(D): CPT

## 2020-10-08 PROCEDURE — 99284 EMERGENCY DEPT VISIT MOD MDM: CPT | Mod: 25

## 2020-10-08 PROCEDURE — 81025 URINE PREGNANCY TEST: CPT | Performed by: EMERGENCY MEDICINE

## 2020-10-08 PROCEDURE — 85025 COMPLETE CBC W/AUTO DIFF WBC: CPT

## 2020-10-08 NOTE — ED TRIAGE NOTES
Pt presents to ED with complaints of vaginal bleeding that began last night. Pt states that there have been clots. Pt denies other medical complaints at this time. Pt states that she does still have her uterus and last say an OB/GYN 6 years ago. Pt has RLQ ostomy bag that was placed in 2015.     Yusuf  Vidal # [id number]

## 2020-10-08 NOTE — DISCHARGE INSTRUCTIONS
MAKE SURE YOU FOLLOW UP WITH A GYNOCOLOGIST  RETURN TO THE EMERGENCY DEPARTMENT IF YOU START BLEEDING HEAVILY OR FEEL WEAK AND DIZZY

## 2020-10-08 NOTE — ED PROVIDER NOTES
"Encounter Date: 10/8/2020    SCRIBE #1 NOTE: REJI, Zabrina Bonilla, am scribing for, and in the presence of, Dr. Obrien.       History     Chief Complaint   Patient presents with    Vaginal Bleeding     began last night, denies blood clots, with generalized abd pain, pt has colostomy. denies n/v, denies urinary symptms      Azucena Morrison is a 70 y.o. female who  has a past medical history of Chronic kidney disease, stage 3, Diabetes mellitus, Diabetes mellitus, type 2, Hypertension, Malnutrition of moderate degree (2020), and UTI (urinary tract infection) (2020).    The patient presents to the ED due to vaginal bleeding. Onset began last night and is very mild. She notes "stringy clots." The last time patient saw an OBGYN was about 6 years ago. Patient denies dysuria, back pain or any other symptoms. Patient has colostomy bag due to surgery in  for polyps.     The history is provided by the patient. The history is limited by a language barrier. A  was used.     Review of patient's allergies indicates:   Allergen Reactions    Chlorhexidine gluconate Rash     Chlorhexidine/alcohol wipes. Rash and blistering.     Past Medical History:   Diagnosis Date    Chronic kidney disease, stage 3     Diabetes mellitus     Diabetes mellitus, type 2     Hypertension     Malnutrition of moderate degree 2020    UTI (urinary tract infection) 2020     Past Surgical History:   Procedure Laterality Date    CATHETERIZATION OF URETER Bilateral 2020    Procedure: CATHETERIZATION, URETER;  Surgeon: Kvng Cunningham MD;  Location: [address];  Service: Urology;  Laterality: Bilateral;     SECTION, CLASSIC      x2    CHOLECYSTECTOMY      COL[facility] SURGERY      [facility]    COLONOSCOPY N/A 2018    Procedure: COLONOSCOPY;  Surgeon: Gibran Aguayo MD;  Location: [facility];  Service: Endoscopy;  Laterality: N/A;    COLOSTOMY Left     CYSTOSCOPY N/A 2020    " Procedure: CYSTOSCOPY;  Surgeon: Kvng Cunningham MD;  Location: [facility] OR 2ND FLR;  Service: Urology;  Laterality: N/A;    CYSTOSCOPY WITH URETEROSCOPY, RETROGRADE PYELOGRAPHY, AND INSERTION OF STENT Left 2019    Procedure: CYSTOSCOPY, WITH RETROGRADE PYELOGRAM AND URETERAL STENT INSERTION with placement of a muir cath;  Surgeon: Ulisses Horton MD;  Location: [facility] OR;  Service: General;  Laterality: Left;    ILEOSTOMY  2020    Procedure: CREATION, ILEOSTOMY;  Surgeon: Fabiana Valiente MD;  Location: [facility] OR 2ND FLR;  Service: Colon and Rectal;;    INCISION AND DRAINAGE OF ABSCESS N/A 2019    Procedure: INCISION AND DRAINAGE, ABSCESS;  Surgeon: Ulisses Horton MD;  Location: [facility] OR;  Service: General;  Laterality: N/A;    INCISION OF ABDOMINAL WALL N/A 8/10/2018    Procedure: INCISION, ABDOMINAL WALL;  Surgeon: Ulisses Horton MD;  Location: [facility] OR;  Service: General;  Laterality: N/A;    INCISIONAL HERNIA REPAIR Right     LOW ANTERIOR RESECTION OF COLON N/A 2020    Procedure: RESECTION, COLON, LOW ANTERIOR;  Surgeon: Fabiana Valiente MD;  Location: [facility] OR [facility] FLR;  Service: Colon and Rectal;  Laterality: N/A;    LYSIS OF ADHESIONS  2020    Procedure: LYSIS, ADHESIONS;  Surgeon: Fabiana Valiente MD;  Location: [facility] OR 2ND FLR;  Service: Colon and Rectal;;     Family History   Problem Relation Age of Onset    Stroke Mother     Heart disease Mother     Hyperlipidemia Mother     Hypertension Mother     Alcohol abuse Father     Stroke Sister     Diabetes Sister     Heart disease Sister     Hyperlipidemia Sister     Hypertension Sister     Diabetes Brother     Early death Grandchild      Social History     Tobacco Use    Smoking status: Former Smoker     Types: Cigarettes     Quit date: 2000     Years since quittin.7    Smokeless tobacco: Never Used   Substance Use Topics    Alcohol use: No    Drug use: No     Review of Systems    Constitutional: Negative for fever.   HENT: Negative for sore throat.    Respiratory: Negative for shortness of breath.    Cardiovascular: Negative for chest pain.   Gastrointestinal: Negative for nausea.   Genitourinary: Positive for vaginal bleeding. Negative for dysuria.   Musculoskeletal: Negative for back pain.   Skin: Negative for rash.   Neurological: Negative for weakness.   Hematological: Does not bruise/bleed easily.       Physical Exam     Initial Vitals [10/08/20 0950]   BP Pulse Resp Temp SpO2   112/75 96 18 98.6 °F (37 °C) 98 %      MAP       --         Physical Exam    Nursing note and vitals reviewed.  Constitutional: She appears well-developed and well-nourished. She is not diaphoretic. No distress.   HENT:   Head: Normocephalic and atraumatic.   Eyes: Conjunctivae and EOM are normal.   Neck: Normal range of motion. Neck supple.   Cardiovascular: Normal rate, regular rhythm and normal heart sounds.   Pulmonary/Chest: Breath sounds normal. No respiratory distress.   Abdominal: Soft. Bowel sounds are normal. She exhibits no distension. There is no abdominal tenderness.   Surgical scar to midline  Colostomy bag on the RLQ and drain tube in LLQ with minimal drainage  No erythema to the site    Genitourinary:    Genitourinary Comments: Blood in vaginal vault  Unable to palpate cervix or uterus.      Musculoskeletal: Normal range of motion. No tenderness or edema.   Neurological: She is alert and oriented to person, place, and time. She has normal strength.   Skin: Skin is warm and dry. Capillary refill takes less than 2 seconds.         ED Course   Procedures  Labs Reviewed   CBC W/ AUTO DIFFERENTIAL - Abnormal; Notable for the following components:       Result Value    RBC 3.72 (*)     Hemoglobin 10.0 (*)     Hematocrit 30.1 (*)     Mean Corpuscular Volume 81 (*)     Mean Corpuscular Hemoglobin 26.9 (*)     RDW 17.3 (*)     Platelets 355 (*)     MPV 8.1 (*)     All other components within normal  limits   COMPREHENSIVE METABOLIC PANEL - Abnormal; Notable for the following components:    CO2 20 (*)     Glucose 114 (*)     Albumin 3.0 (*)     All other components within normal limits   HCG, QUANTITATIVE, PREGNANCY   POCT URINE PREGNANCY   GROUP & RH          Imaging Results           US Pelvis Comp with Transvag NON-OB (xpd (Final result)  Result time 10/08/20 13:43:34    Final result by Evangelist Salcedo MD (10/08/20 13:43:34)                 Impression:      1. No acute sonographic abnormality.  2. Slightly heterogeneous and borderline thickened endometrium in this reportedly postmenopausal female.  Additionally, there are scattered small cystic areas along the endometrium.  Clinical correlation advised.  Consultation with the gynecology service and direct visualization and/or biopsy of the endometrium may be warranted.  3. Left ovary not identified.  This report was flagged in Epic as abnormal.      Electronically signed by: Evangelist Salcedo MD  Date:    10/08/2020  Time:    13:43             Narrative:    EXAMINATION:  US PELVIS COMP WITH TRANSVAG NON-OB (XPD)    CLINICAL HISTORY:  vaginal bleeding;    TECHNIQUE:  Transabdominal sonography of the pelvis was performed, followed by transvaginal sonography to better evaluate the uterus and ovaries.    COMPARISON:  CT abdomen and pelvis 09/16/2020    FINDINGS:  Uterus:    Measures 6.5 x 3.2 x 4.5 cm in dimensions.  No discrete uterine fibroids identified.  The endometrium is slightly heterogeneous and measures up to 4 mm in maximum thickness, in this reportedly postmenopausal female.  There are a few scattered small anechoic areas noted along the endometrium with somewhat poor visibility of the endometrial stripe.  Small nabothian cyst noted.    Ovaries:    Left ovary is not identified.  The right ovary measures 1.7 x 1.8 x 1.5 cm.  No left adnexal mass seen.  Doppler flow demonstrated to the right ovary.    Free Fluid:    None.                                  Medical Decision Making:   History:   Old Medical Records: I decided to obtain old medical records.  Clinical Tests:   Lab Tests: Ordered and Reviewed                             Clinical Impression:     ICD-10-CM ICD-9-CM   1. Vaginal bleeding  N93.9 623.8   2. Dysfunctional uterine bleeding  N93.8 626.8                          ED Disposition Condition    Discharge Stable        ED Prescriptions     None        Follow-up Information     Follow up With Specialties Details Why Contact Info    Caitlyn Feng MD Obstetrics and Gynecology Schedule an appointment as soon as possible for a visit   [address] [place] [address]  [phone]                          I, Ann-Marie Obrien, personally performed the services described in this documentation. All medical record entries made by the scribe were at my direction and in my presence.  I have reviewed the chart and agree that the record reflects my personal performance and is accurate and complete. Ann-Marie Obrien M.D. 4:08 PM10/08/2020               Ann-Marie Obrien MD  10/08/20 7219

## 2020-10-09 ENCOUNTER — TELEPHONE (OUTPATIENT)
Dept: OBSTETRICS AND GYNECOLOGY | Facility: CLINIC | Age: 70
End: 2020-10-09

## 2020-10-09 NOTE — TELEPHONE ENCOUNTER
Patient was seen in ER yesterday and we were giving her appt to f/u here. She was discharge before they confirmed her apopintment. Can you let her know that I made the appointment on the 14th at 315? Make sure that will work for her. Thanks

## 2020-10-12 ENCOUNTER — LAB VISIT (OUTPATIENT)
Dept: LAB | Facility: HOSPITAL | Age: 70
End: 2020-10-12
Attending: INTERNAL MEDICINE
Payer: MEDICARE

## 2020-10-12 ENCOUNTER — HOSPITAL ENCOUNTER (OUTPATIENT)
Dept: RADIOLOGY | Facility: HOSPITAL | Age: 70
Discharge: HOME | End: 2020-10-12
Attending: INTERNAL MEDICINE
Payer: MEDICARE

## 2020-10-12 DIAGNOSIS — N18.6 TYPE 2 DIABETES MELLITUS WITH END-STAGE RENAL DISEASE: ICD-10-CM

## 2020-10-12 DIAGNOSIS — E11.22 TYPE 2 DIABETES MELLITUS WITH END-STAGE RENAL DISEASE: ICD-10-CM

## 2020-10-12 DIAGNOSIS — L02.211 ABSCESS OF ABDOMINAL WALL: ICD-10-CM

## 2020-10-12 DIAGNOSIS — T81.49XA POSTOPERATIVE SUBPHRENIC ABSCESS: Primary | ICD-10-CM

## 2020-10-12 LAB
CRP SERPL-MCNC: 5.5 MG/L (ref 0–8.2)
ERYTHROCYTE [SEDIMENTATION RATE] IN BLOOD BY WESTERGREN METHOD: 61 MM/HR (ref 0–36)

## 2020-10-12 PROCEDURE — 74177 CT ABDOMEN PELVIS WITH CONTRAST: ICD-10-PCS | Mod: 26,,, | Performed by: RADIOLOGY

## 2020-10-12 PROCEDURE — 85025 COMPLETE CBC W/AUTO DIFF WBC: CPT

## 2020-10-12 PROCEDURE — 85652 RBC SED RATE AUTOMATED: CPT

## 2020-10-12 PROCEDURE — 25500020 PHARM REV CODE 255: Performed by: INTERNAL MEDICINE

## 2020-10-12 PROCEDURE — 74177 CT ABD & PELVIS W/CONTRAST: CPT | Mod: TC

## 2020-10-12 PROCEDURE — 80053 COMPREHEN METABOLIC PANEL: CPT

## 2020-10-12 PROCEDURE — 74177 CT ABD & PELVIS W/CONTRAST: CPT | Mod: 26,,, | Performed by: RADIOLOGY

## 2020-10-12 PROCEDURE — 86140 C-REACTIVE PROTEIN: CPT

## 2020-10-12 RX ADMIN — IOHEXOL 75 ML: 350 INJECTION, SOLUTION INTRAVENOUS at 11:10

## 2020-10-13 ENCOUNTER — TELEPHONE (OUTPATIENT)
Dept: INFECTIOUS DISEASES | Facility: CLINIC | Age: 70
End: 2020-10-13

## 2020-10-13 ENCOUNTER — OFFICE VISIT (OUTPATIENT)
Dept: INFECTIOUS DISEASES | Facility: CLINIC | Age: 70
End: 2020-10-13
Payer: MEDICARE

## 2020-10-13 VITALS
DIASTOLIC BLOOD PRESSURE: 80 MMHG | BODY MASS INDEX: 26.06 KG/M2 | HEIGHT: 61 IN | HEART RATE: 109 BPM | WEIGHT: 138 LBS | TEMPERATURE: 98 F | SYSTOLIC BLOOD PRESSURE: 130 MMHG

## 2020-10-13 DIAGNOSIS — L02.211 ABSCESS OF ABDOMINAL WALL: Primary | ICD-10-CM

## 2020-10-13 DIAGNOSIS — Z79.4 TYPE 2 DIABETES MELLITUS WITHOUT COMPLICATION, WITH LONG-TERM CURRENT USE OF INSULIN: Chronic | ICD-10-CM

## 2020-10-13 DIAGNOSIS — R21 SKIN RASH: ICD-10-CM

## 2020-10-13 DIAGNOSIS — E11.9 TYPE 2 DIABETES MELLITUS WITHOUT COMPLICATION, WITH LONG-TERM CURRENT USE OF INSULIN: Chronic | ICD-10-CM

## 2020-10-13 DIAGNOSIS — N18.30 STAGE 3 CHRONIC KIDNEY DISEASE, UNSPECIFIED WHETHER STAGE 3A OR 3B CKD: Chronic | ICD-10-CM

## 2020-10-13 LAB
ALBUMIN SERPL BCP-MCNC: 3.3 G/DL (ref 3.5–5.2)
ALP SERPL-CCNC: 101 U/L (ref 55–135)
ALT SERPL W/O P-5'-P-CCNC: 44 U/L (ref 10–44)
ANION GAP SERPL CALC-SCNC: 11 MMOL/L (ref 8–16)
AST SERPL-CCNC: 62 U/L (ref 10–40)
BASOPHILS # BLD AUTO: 0.04 K/UL (ref 0–0.2)
BASOPHILS NFR BLD: 0.6 % (ref 0–1.9)
BILIRUB SERPL-MCNC: 0.3 MG/DL (ref 0.1–1)
BUN SERPL-MCNC: 22 MG/DL (ref 8–23)
CALCIUM SERPL-MCNC: 9.5 MG/DL (ref 8.7–10.5)
CHLORIDE SERPL-SCNC: 107 MMOL/L (ref 95–110)
CO2 SERPL-SCNC: 21 MMOL/L (ref 23–29)
CREAT SERPL-MCNC: 1.1 MG/DL (ref 0.5–1.4)
DIFFERENTIAL METHOD: ABNORMAL
EOSINOPHIL # BLD AUTO: 0.3 K/UL (ref 0–0.5)
EOSINOPHIL NFR BLD: 4.9 % (ref 0–8)
ERYTHROCYTE [DISTWIDTH] IN BLOOD BY AUTOMATED COUNT: 18.2 % (ref 11.5–14.5)
EST. GFR  (AFRICAN AMERICAN): 59.2 ML/MIN/1.73 M^2
EST. GFR  (NON AFRICAN AMERICAN): 51.3 ML/MIN/1.73 M^2
GLUCOSE SERPL-MCNC: 109 MG/DL (ref 70–110)
HCT VFR BLD AUTO: 34.6 % (ref 37–48.5)
HGB BLD-MCNC: 10.9 G/DL (ref 12–16)
IMM GRANULOCYTES # BLD AUTO: 0.01 K/UL (ref 0–0.04)
IMM GRANULOCYTES NFR BLD AUTO: 0.1 % (ref 0–0.5)
LYMPHOCYTES # BLD AUTO: 2 K/UL (ref 1–4.8)
LYMPHOCYTES NFR BLD: 29.7 % (ref 18–48)
MCH RBC QN AUTO: 26.8 PG (ref 27–31)
MCHC RBC AUTO-ENTMCNC: 31.5 G/DL (ref 32–36)
MCV RBC AUTO: 85 FL (ref 82–98)
MONOCYTES # BLD AUTO: 0.4 K/UL (ref 0.3–1)
MONOCYTES NFR BLD: 6.1 % (ref 4–15)
NEUTROPHILS # BLD AUTO: 3.9 K/UL (ref 1.8–7.7)
NEUTROPHILS NFR BLD: 58.6 % (ref 38–73)
NRBC BLD-RTO: 0 /100 WBC
PLATELET # BLD AUTO: 350 K/UL (ref 150–350)
PMV BLD AUTO: 8.9 FL (ref 9.2–12.9)
POTASSIUM SERPL-SCNC: 3.3 MMOL/L (ref 3.5–5.1)
PROT SERPL-MCNC: 7.9 G/DL (ref 6–8.4)
RBC # BLD AUTO: 4.06 M/UL (ref 4–5.4)
SODIUM SERPL-SCNC: 139 MMOL/L (ref 136–145)
WBC # BLD AUTO: 6.69 K/UL (ref 3.9–12.7)

## 2020-10-13 PROCEDURE — 99215 PR OFFICE/OUTPT VISIT, EST, LEVL V, 40-54 MIN: ICD-10-PCS | Mod: S$GLB,,, | Performed by: INTERNAL MEDICINE

## 2020-10-13 PROCEDURE — 3044F HG A1C LEVEL LT 7.0%: CPT | Mod: CPTII,S$GLB,, | Performed by: INTERNAL MEDICINE

## 2020-10-13 PROCEDURE — 1159F MED LIST DOCD IN RCRD: CPT | Mod: S$GLB,,, | Performed by: INTERNAL MEDICINE

## 2020-10-13 PROCEDURE — 3079F DIAST BP 80-89 MM HG: CPT | Mod: CPTII,S$GLB,, | Performed by: INTERNAL MEDICINE

## 2020-10-13 PROCEDURE — 99999 PR PBB SHADOW E&M-EST. PATIENT-LVL IV: CPT | Mod: PBBFAC,,, | Performed by: INTERNAL MEDICINE

## 2020-10-13 PROCEDURE — 1101F PT FALLS ASSESS-DOCD LE1/YR: CPT | Mod: CPTII,S$GLB,, | Performed by: INTERNAL MEDICINE

## 2020-10-13 PROCEDURE — 1125F AMNT PAIN NOTED PAIN PRSNT: CPT | Mod: S$GLB,,, | Performed by: INTERNAL MEDICINE

## 2020-10-13 PROCEDURE — 3075F SYST BP GE 130 - 139MM HG: CPT | Mod: CPTII,S$GLB,, | Performed by: INTERNAL MEDICINE

## 2020-10-13 PROCEDURE — 1125F PR PAIN SEVERITY QUANTIFIED, PAIN PRESENT: ICD-10-PCS | Mod: S$GLB,,, | Performed by: INTERNAL MEDICINE

## 2020-10-13 PROCEDURE — 3079F PR MOST RECENT DIASTOLIC BLOOD PRESSURE 80-89 MM HG: ICD-10-PCS | Mod: CPTII,S$GLB,, | Performed by: INTERNAL MEDICINE

## 2020-10-13 PROCEDURE — 1159F PR MEDICATION LIST DOCUMENTED IN MEDICAL RECORD: ICD-10-PCS | Mod: S$GLB,,, | Performed by: INTERNAL MEDICINE

## 2020-10-13 PROCEDURE — 1101F PR PT FALLS ASSESS DOC 0-1 FALLS W/OUT INJ PAST YR: ICD-10-PCS | Mod: CPTII,S$GLB,, | Performed by: INTERNAL MEDICINE

## 2020-10-13 PROCEDURE — 3075F PR MOST RECENT SYSTOLIC BLOOD PRESS GE 130-139MM HG: ICD-10-PCS | Mod: CPTII,S$GLB,, | Performed by: INTERNAL MEDICINE

## 2020-10-13 PROCEDURE — 99999 PR PBB SHADOW E&M-EST. PATIENT-LVL IV: ICD-10-PCS | Mod: PBBFAC,,, | Performed by: INTERNAL MEDICINE

## 2020-10-13 PROCEDURE — 3008F BODY MASS INDEX DOCD: CPT | Mod: CPTII,S$GLB,, | Performed by: INTERNAL MEDICINE

## 2020-10-13 PROCEDURE — 3044F PR MOST RECENT HEMOGLOBIN A1C LEVEL <7.0%: ICD-10-PCS | Mod: CPTII,S$GLB,, | Performed by: INTERNAL MEDICINE

## 2020-10-13 PROCEDURE — 3008F PR BODY MASS INDEX (BMI) DOCUMENTED: ICD-10-PCS | Mod: CPTII,S$GLB,, | Performed by: INTERNAL MEDICINE

## 2020-10-13 PROCEDURE — 99215 OFFICE O/P EST HI 40 MIN: CPT | Mod: S$GLB,,, | Performed by: INTERNAL MEDICINE

## 2020-10-13 RX ORDER — CLOTRIMAZOLE AND BETAMETHASONE DIPROPIONATE 10; .64 MG/G; MG/G
CREAM TOPICAL 2 TIMES DAILY
Qty: 45 G | Refills: 1 | Status: SHIPPED | OUTPATIENT
Start: 2020-10-13 | End: 2021-11-18

## 2020-10-13 NOTE — PROGRESS NOTES
"  Subjective:     Patient ID: Azucena Morrison is a 70 y.o. 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 Abdominal wound culture shave been positive for ESBL E. Coli, citrobacter, klebsiella, fusobacterium, bacteroides, prevotella, actinomyces in the past. She has been on multiple antibiotic regimens and multiple admissions for SBO 2/2 adhesions. She is now s/p admission at [facility] for low anterior resection with de-rotation of the R colon and colorectal anastomosis, SBR and diverting ileostomy on 7/31/2020. Post-op course complicated by intra-abdominal fluid collections. On 8/6, IR drain was placed into anterior peritoneal fluid collection (cx + ESBL E. Coli). This drain was upsized on 8/12. An additional drain was placed in the L mid abdomen fluid collection on 8/17 (cx + ESBL E. Coli). Repeat CT on 9/15 reveals stable anterior abdominal wall air-fluid collection. ID re-consulted for antibiotic recommendations as patient will be discharged home today."    Repeat CT done on 10/12 w/ decrease in anterior abdominal wall fluid collection. Patient remains on meropenem.    Interval history:    10/13/2020: Patient presents to clinic today for follow up evaluation. She still has a drain in anterior abdominal wall fluid collection, which was slightly decreased in size on most recent CT scan. She states there has not been any drainage in the drain for some time. She denies any significant abd pain. She states she occasionally has a lot of liquid stool in ostomy bag, and sometimes it is more formed. She endorses some weight loss, but is feeling well, and her appetite is good on most days. She states she has a rash on her RLE that is " itchy and raised. Another provider told her this is a fungal infection. She is present today with her son, who helps her with answering some questions. He has no concerns with how she is doing.     Review of Systems   Constitution: Positive for malaise/fatigue and weight loss. Negative for chills, decreased appetite, fever and night sweats.   HENT: Negative for congestion and sore throat.    Eyes: Negative for blurred vision, double vision, vision loss in left eye, vision loss in right eye and visual disturbance.   Cardiovascular: Negative for chest pain, dyspnea on exertion, irregular heartbeat and leg swelling.   Respiratory: Negative for cough, hemoptysis, shortness of breath and sputum production.    Hematologic/Lymphatic: Negative for adenopathy. Does not bruise/bleed easily.   Skin: Positive for itching and rash. Negative for suspicious lesions.   Musculoskeletal: Negative for arthritis, joint pain, muscle weakness and myalgias.   Gastrointestinal: Negative for abdominal pain, constipation, diarrhea, heartburn, nausea and vomiting.        Liquid ostomy output   Genitourinary: Negative for dysuria, flank pain, frequency and genital sores.   Neurological: Negative for dizziness, focal weakness, numbness, paresthesias, sensory change, tremors and weakness.   Psychiatric/Behavioral: Negative for altered mental status and depression. The patient is not nervous/anxious.    Allergic/Immunologic: Negative for environmental allergies and persistent infections.        Past Medical History:   Diagnosis Date    Chronic kidney disease, stage 3     Diabetes mellitus     Diabetes mellitus, type 2     Hypertension     Malnutrition of moderate degree 8/23/2020    UTI (urinary tract infection) 8/6/2020     Past Surgical History:   Procedure Laterality Date    CATHETERIZATION OF URETER Bilateral 7/31/2020    Procedure: CATHETERIZATION, URETER;  Surgeon: Kvng Cunningham MD;  [facility] OR [address] [place] [address];  Service: Urology;   Laterality: Bilateral;     SECTION, CLASSIC      x2    CHOLECYSTECTOMY      COLON SURGERY      [facility]    COLONOSCOPY N/A 2018    Procedure: COLONOSCOPY;  Surgeon: Gibran Aguayo MD;  Location: [facility] ENDO;  Service: Endoscopy;  Laterality: N/A;    COLOSTOMY Left     CYSTOSCOPY N/A 2020    Procedure: CYSTOSCOPY;  Surgeon: Kvng Cunningham MD;  Location: [facility] OR [facility] FLR;  Service: Urology;  Laterality: N/A;    CYSTOSCOPY WITH URETEROSCOPY, RETROGRADE PYELOGRAPHY, AND INSERTION OF STENT Left 2019    Procedure: CYSTOSCOPY, WITH RETROGRADE PYELOGRAM AND URETERAL STENT INSERTION with placement of a muir cath;  Surgeon: Ulisses Horton MD;  Location: [facility] OR;  Service: General;  Laterality: Left;    ILEOSTOMY  2020    Procedure: CREATION, ILEOSTOMY;  Surgeon: Fabiana Valiente MD;  [facility];  Service: Colon and Rectal;;    INCISION AND DRAINAGE OF ABSCESS N/A 2019    Procedure: INCISION AND DRAINAGE, ABSCESS;  Surgeon: Ulisses Horton MD;  Location: [facility] OR;  Service: General;  Laterality: N/A;    INCISION OF ABDOMINAL WALL N/A 8/10/2018    Procedure: INCISION, ABDOMINAL WALL;  Surgeon: Ulisses Horton MD;  Location: [facility] OR;  Service: General;  Laterality: N/A;    INCISIONAL HERNIA REPAIR Right     LOW ANTERIOR RESECTION OF COLON N/A 2020    Procedure: RESECTION, COLON, LOW ANTERIOR;  Surgeon: Fabiana Valiente MD;  Location: [facility] OR 2ND FLR;  Service: Colon and Rectal;  Laterality: N/A;    LYSIS OF ADHESIONS  2020    Procedure: LYSIS, ADHESIONS;  Surgeon: Fabiana Valiente MD;  Location: [facility] OR 2ND FLR;  Service: Colon and Rectal;;     Family History   Problem Relation Age of Onset    Stroke Mother     Heart disease Mother     Hyperlipidemia Mother     Hypertension Mother     Alcohol abuse Father     Stroke Sister     Diabetes Sister     Heart disease Sister     Hyperlipidemia Sister      Hypertension Sister     Diabetes Brother     Early death Grandchild      Social History     Tobacco Use    Smoking status: Former Smoker     Types: Cigarettes     Quit date: 2000     Years since quittin.7    Smokeless tobacco: Never Used   Substance Use Topics    Alcohol use: No    Drug use: No       Objective:     Physical Exam  Vitals signs reviewed.   Constitutional:       General: She is not in acute distress.     Appearance: Normal appearance. She is well-developed. She is not ill-appearing or diaphoretic.   HENT:      Head: Atraumatic.      Right Ear: External ear normal.      Left Ear: External ear normal.      Nose: Nose normal. No congestion or rhinorrhea.      Mouth/Throat:      Mouth: Mucous membranes are moist.      Pharynx: Oropharynx is clear. No oropharyngeal exudate or posterior oropharyngeal erythema.   Eyes:      General: No scleral icterus.        Right eye: No discharge.         Left eye: No discharge.      Extraocular Movements: Extraocular movements intact.      Conjunctiva/sclera: Conjunctivae normal.   Neck:      Musculoskeletal: Normal range of motion and neck supple.   Cardiovascular:      Rate and Rhythm: Normal rate and regular rhythm.      Pulses: Normal pulses.      Heart sounds: Normal heart sounds. No murmur.   Pulmonary:      Effort: Pulmonary effort is normal. No respiratory distress.      Breath sounds: Normal breath sounds. No wheezing.   Abdominal:      General: Bowel sounds are normal. There is no distension.      Palpations: Abdomen is soft.      Tenderness: There is no abdominal tenderness.      Comments: Nontender, soft, RLQ ostomy w/ appliance in place, no leakage. Anterior abdominal wall drain with no output.    Musculoskeletal: Normal range of motion.         General: No swelling or deformity.   Skin:     General: Skin is warm and dry.      Findings: No erythema or rash.      Comments: Small hyperpigmented, slightly raised papular rash on R lateral calf,  pruritic, mild excoriations surrounding site   Neurological:      Mental Status: She is alert and oriented to person, place, and time.      Cranial Nerves: No cranial nerve deficit.      Coordination: Coordination normal.   Psychiatric:         Mood and Affect: Mood normal.         Behavior: Behavior normal.         Data:    All data, including recent labs, radiology, and pathology, has been independently reviewed.    Labs:    Recent Labs   Lab Result Units 10/02/20  1222 10/05/20  1621 10/08/20  1146 10/12/20  1518   WBC K/uL 6.29 6.53 6.43 6.69   Hemoglobin g/dL 10.1* 10.8* 10.0* 10.9*   Hematocrit % 31.3* 34.2* 30.1* 34.6*   Sodium mmol/L 140 140 138  --    Potassium mmol/L 3.2* 2.8* 3.7  --    Chloride mmol/L 106 104 108  --    BUN, Bld mg/dL 13 16 13  --    Creatinine mg/dL 1.0 1.1 0.9  --    AST U/L 37 23 26  --    ALT U/L 51* 29 26  --    Alkaline Phosphatase U/L 114 110 104  --    Total Bilirubin mg/dL 0.2 0.4 0.3  --    CRP mg/L  --  7.3  --  5.5        Radiology:    No results found in the last 24 hours.     Assessment:    1. Abscess of abdominal wall  Ambulatory referral/consult  to [facility] Interventional RAD    Recent CT A/P still shows residual fluid collection, drain in place w/ no output    Will ask IR to evaluate recent CT and see if drain removal or replacement is appropriate  Follow up appointment w/ surgery is scheduled for 10/19    Will continue meropenem pending drain management plan    Continue weekly labs to monitor for drug toxicity  No CMP results available from this week's lab draw, will have done tomorrow if it was not drawn.     2. Skin rash  clotrimazole-betamethasone 1-0.05% (LOTRISONE) cream  Pt should see dermatology if this worsens or does not resolve   3. Stage 3 chronic kidney disease, unspecified whether stage 3a or 3b CKD  Stable, continue current management  Currently on potassium supplementation for recurrent hypokalemia - check CMP   4. Type 2 diabetes mellitus without  complication, with long-term current use of insulin  Stable, continue current management        These recommendations have been sent to and/or discussed with the following providers:   - Pj Sanches MD    Follow up in 1 month    Katalina Martinez DO  Infectious Disease

## 2020-10-13 NOTE — TELEPHONE ENCOUNTER
Called and left message with IR re: patient drain placement.  Waiting on a return call with appt for patient

## 2020-10-14 ENCOUNTER — PATIENT MESSAGE (OUTPATIENT)
Dept: INFECTIOUS DISEASES | Facility: CLINIC | Age: 70
End: 2020-10-14

## 2020-10-14 ENCOUNTER — OFFICE VISIT (OUTPATIENT)
Dept: OBSTETRICS AND GYNECOLOGY | Facility: CLINIC | Age: 70
End: 2020-10-14
Payer: MEDICARE

## 2020-10-14 VITALS
HEIGHT: 61 IN | BODY MASS INDEX: 26.14 KG/M2 | SYSTOLIC BLOOD PRESSURE: 110 MMHG | WEIGHT: 138.44 LBS | DIASTOLIC BLOOD PRESSURE: 70 MMHG

## 2020-10-14 DIAGNOSIS — Z12.4 PAP SMEAR FOR CERVICAL CANCER SCREENING: ICD-10-CM

## 2020-10-14 DIAGNOSIS — Z12.31 SCREENING MAMMOGRAM, ENCOUNTER FOR: ICD-10-CM

## 2020-10-14 DIAGNOSIS — K63.0 ABSCESS OF INTESTINE: Primary | ICD-10-CM

## 2020-10-14 DIAGNOSIS — Z01.419 ENCOUNTER FOR ANNUAL ROUTINE GYNECOLOGICAL EXAMINATION: Primary | ICD-10-CM

## 2020-10-14 DIAGNOSIS — N95.0 PMB (POSTMENOPAUSAL BLEEDING): ICD-10-CM

## 2020-10-14 PROCEDURE — 87624 HPV HI-RISK TYP POOLED RSLT: CPT

## 2020-10-14 PROCEDURE — 58100 BIOPSY OF UTERUS LINING: CPT | Mod: S$GLB,,, | Performed by: OBSTETRICS & GYNECOLOGY

## 2020-10-14 PROCEDURE — 58100 PR BIOPSY OF UTERUS LINING: ICD-10-PCS | Mod: S$GLB,,, | Performed by: OBSTETRICS & GYNECOLOGY

## 2020-10-14 PROCEDURE — 88175 CYTOPATH C/V AUTO FLUID REDO: CPT

## 2020-10-14 PROCEDURE — G0101 PR CA SCREEN;PELVIC/BREAST EXAM: ICD-10-PCS | Mod: S$GLB,,, | Performed by: OBSTETRICS & GYNECOLOGY

## 2020-10-14 PROCEDURE — 88305 TISSUE EXAM BY PATHOLOGIST: CPT | Performed by: PATHOLOGY

## 2020-10-14 PROCEDURE — 99999 PR PBB SHADOW E&M-EST. PATIENT-LVL III: CPT | Mod: PBBFAC,,, | Performed by: OBSTETRICS & GYNECOLOGY

## 2020-10-14 PROCEDURE — 99999 PR PBB SHADOW E&M-EST. PATIENT-LVL III: ICD-10-PCS | Mod: PBBFAC,,, | Performed by: OBSTETRICS & GYNECOLOGY

## 2020-10-14 PROCEDURE — G0101 CA SCREEN;PELVIC/BREAST EXAM: HCPCS | Mod: S$GLB,,, | Performed by: OBSTETRICS & GYNECOLOGY

## 2020-10-14 PROCEDURE — 88305 TISSUE EXAM BY PATHOLOGIST: CPT | Mod: 26,,, | Performed by: PATHOLOGY

## 2020-10-14 PROCEDURE — 88305 TISSUE EXAM BY PATHOLOGIST: ICD-10-PCS | Mod: 26,,, | Performed by: PATHOLOGY

## 2020-10-14 NOTE — PROGRESS NOTES
"No chief complaint on file.      HISTORY OF PRESENT ILLNESS:   Azucena Morrison is a 70 y.o. female  who presents for well woman exam.  No LMP recorded (lmp unknown). Patient is postmenopausal..  She is ,"menopausal at age 34. She reports last Thursday she noted bleeding when wiping that happened twice. No enough to get on her underwear. No blood in urine and has colostomy so not likely rectally. She also put her finger vaginally and that is where it was.     Past Medical History:   Diagnosis Date    Chronic kidney disease, stage 3     Diabetes mellitus     Diabetes mellitus, type 2     Hypertension     Malnutrition of moderate degree 2020    UTI (urinary tract infection) 2020        Past Surgical History:   Procedure Laterality Date    CATHETERIZATION OF URETER Bilateral 2020    Procedure: CATHETERIZATION, URETER;  Surgeon: Kvng Cunningham MD;  Location: [facility] OR [address] [place] [address];  Service: Urology;  Laterality: Bilateral;     SECTION, CLASSIC      x2    CHOLECYSTECTOMY      COLON SURGERY      [facility]    COLONOSCOPY N/A 2018    Procedure: COLONOSCOPY;  Surgeon: Gibran Aguayo MD;  Location: [facility];  Service: Endoscopy;  Laterality: N/A;    COLOSTOMY Left     CYSTOSCOPY N/A 2020    Procedure: CYSTOSCOPY;  Surgeon: Kvng Cunningham MD;  Location: [address];  Service: Urology;  Laterality: N/A;    CYSTOSCOPY WITH URETEROSCOPY, RETROGRADE PYELOGRAPHY, AND INSERTION OF STENT Left 2019    Procedure: CYSTOSCOPY, WITH RETROGRADE PYELOGRAM AND URETERAL STENT INSERTION with placement of a muir cath;  Surgeon: Ulisses Horton MD;  Location: [facility];  Service: General;  Laterality: Left;    ILEOSTOMY  2020    Procedure: CREATION, ILEOSTOMY;  Surgeon: Fabiana Valiente MD;  Location: [facility] OR [address] [place] [address];  Service: Colon and Rectal;;    INCISION AND DRAINAGE OF ABSCESS N/A 2019    Procedure: INCISION AND DRAINAGE, ABSCESS;  Surgeon: Ulisses" fever sore in my nose sneezing I think I have pn POLLO Horton MD;  Location: [facility] OR;  Service: General;  Laterality: N/A;    INCISION OF ABDOMINAL WALL N/A 8/10/2018    Procedure: INCISION, ABDOMINAL WALL;  Surgeon: Ulisses Horton MD;  Location: [facility] OR;  Service: General;  Laterality: N/A;    INCISIONAL HERNIA REPAIR Right 2010    LOW ANTERIOR RESECTION OF COLON N/A 2020    Procedure: RESECTION, COLON, LOW ANTERIOR;  Surgeon: Fabiana Valiente MD;  Location: [facility] OR 2ND FLR;  Service: Colon and Rectal;  Laterality: N/A;    LYSIS OF ADHESIONS  2020    Procedure: LYSIS, ADHESIONS;  Surgeon: Fabiana Valiente MD;  Location: [facility] OR 2ND FLR;  Service: Colon and Rectal;;       Social History     Socioeconomic History    Marital status: Single     Spouse name: Not on file    Number of children: Not on file    Years of education: Not on file    Highest education level: Not on file   Occupational History    Not on file   Social Needs    Financial resource strain: Not on file    Food insecurity     Worry: Not on file     Inability: Not on file    Transportation needs     Medical: Not on file     Non-medical: Not on file   Tobacco Use    Smoking status: Former Smoker     Types: Cigarettes     Quit date: 2000     Years since quittin.8    Smokeless tobacco: Never Used   Substance and Sexual Activity    Alcohol use: No    Drug use: No    Sexual activity: Not Currently   Lifestyle    Physical activity     Days per week: Not on file     Minutes per session: Not on file    Stress: Not on file   Relationships    Social connections     Talks on phone: Not on file     Gets together: Not on file     Attends Islam service: Not on file     Active member of club or organization: Not on file     Attends meetings of clubs or organizations: Not on file     Relationship status: Not on file   Other Topics Concern    Not on file   Social History Narrative    Not on file       Family History   Problem Relation Age of Onset    Stroke  "Mother     Heart disease Mother     Hyperlipidemia Mother     Hypertension Mother     Alcohol abuse Father     Stroke Sister     Diabetes Sister     Heart disease Sister     Hyperlipidemia Sister     Hypertension Sister     Diabetes Brother     Early death Grandchild        OB History   No obstetric history on file.       COMPREHENSIVE GYN HISTORY:  PAP History: Denies abnormal Paps  Infection History: Denies STDs. Denies PID  Benign History: Denies uterine fibroids. Denies ovarian cysts. Denies endometriosis Denies other conditions.  Cancer History: Denies cervical cancer. Denies uterine cancer or hyperplasia. Denies ovarian cancer. Denies vulvar cancer or pre-cancer. Denies vaginal cancer or pre-cancer. Denies breast cancer. Denies colon cancer.  Cycle: 17/irregular Q few months; stopped at 35 yo     ROS:  GENERAL: Denies weight gain or weight loss. Feeling well overall.   SKIN: Denies rash or lesions.   HEAD: Denies headache.   NODES: Denies enlarged lymph nodes.   CHEST: Denies shortness of breath.   ABDOMEN: No abdominal pain, constipation, diarrhea, nausea, vomiting or rectal bleeding.   URINARY: No frequency, dysuria, hematuria, or burning on urination.  REPRODUCTIVE: See HPI.   BREASTS: The patient denies pain, lumps, or nipple discharge.       /70   Ht 5' 1" (1.549 m)   Wt 62.8 kg (138 lb 7.2 oz)   LMP  (LMP Unknown)   BMI 26.16 kg/m²     APPEARANCE: Well nourished, well developed, in no acute distress.  NECK: Neck symmetric without  thyromegaly.  NODES: No inguinal, cervical lymph node enlargement.  CHEST: Lungs clear to auscultation.  HEART: Regular rate and rhythm, no murmurs, rubs or gallops.  ABDOMEN: Soft. Large vertical incisicion with colostomy and drain in place  BREASTS: Symmetrical, no skin changes or visible lesions. No palpable masses, nipple discharge or adenopathy bilaterally.  PELVIC:   VULVA: No lesions. Normal female genitalia.  URETHRAL MEATUS: Normal size and " location, no lesions, no prolapse.  URETHRA: No masses, tenderness, prolapse or scarring.  VAGINA: Matrophic, no discharge, no significant cystocele or rectocele. No bleeding noted   CERVIX: No lesions and discharge.  UTERUS: Normal size, regular shape, mobile, non-tender, bladder base nontender.  ADNEXA: No masses or tenderness.  PERINEUM: Normal, no masses or lesions     Data Reviewed:     Lipid profile:   Lab Results   Component Value Date    CHOL 291 (H) 06/11/2008     Lab Results   Component Value Date    HDL 23 (L) 06/11/2008     Lab Results   Component Value Date    LDLCALC Invalid, Trig > 400 06/11/2008     Lab Results   Component Value Date    TRIG 626 (H) 08/20/2020    TRIG 348 (H) 08/17/2020    TRIG 192 (H) 08/10/2020     Lab Results   Component Value Date    CHOLHDL 7.9 (L) 06/11/2008     Date:10/14/2020  Time:4:19 PM  Name of the procedure: Endometrial Biopsy  Indications: Azucena Morrison is a 70 y.o. female No obstetric history on file. who presents today for endometrial biopsy secondary to AUB.  No LMP recorded (lmp unknown). Patient is postmenopausal..    Patient consent: Risks/benefits of the procedure were discussed with the patient. Patient's questions were answered.  Consents signed.   TIME OUT completed.  Labs:   Office urine pregnancy test negative;   Procedure: Speculum placed in vagina;  cervix swabbed with betadine x 2; Single tooth tenaculum placed on anterior cervix.  Utreus sounded to 6.5 cm.   Endometrial pipelle advanced without difficulty x 3 passes and adequate tissue obtained. Single tooth tenaculum removed.  Hemostasis achieved.    Complications: None  EBL: 2 cc  Disposition: Pt tolerated the procedure well.    A/P:   -Patient instructed to contact MD or report to emergency room for fever greater than 100.4F, vaginal bleeding greater than 2 pads/hour, severe abdominal pain not relieved with NSAIDs.            1. Encounter for annual routine gynecological examination    2. Pap smear  for cervical cancer screening    3. Screening mammogram, encounter for    4. PMB (postmenopausal bleeding)        Plan: 1. Routine gyn annual exam. s/p normal breast exam and MMG ordered.   Pap with HPV cotesting ordered.  STD testing: declined.   Lipid Profile, needed every 5 years, up to date. Fasting glucose, needed every 3 years, up to date. TSH, needed every 5 years, up to date.  Colonoscopy up to date. DEXA f/u with PCP    2. EMB done today, discussed likely atrophic bleeding as EMS is thin but EMB done as precaution       F/u in 1 yr or PRN

## 2020-10-15 ENCOUNTER — LAB VISIT (OUTPATIENT)
Dept: LAB | Facility: HOSPITAL | Age: 70
End: 2020-10-15
Attending: INTERNAL MEDICINE
Payer: MEDICARE

## 2020-10-15 DIAGNOSIS — N39.0 URINARY TRACT INFECTION, SITE NOT SPECIFIED: ICD-10-CM

## 2020-10-15 DIAGNOSIS — A49.9 INFECTION DUE TO EXTENDED SPECTRUM BETA-LACTAMASE PRODUCING BACTERIA: ICD-10-CM

## 2020-10-15 DIAGNOSIS — R78.81 BACTEREMIA DUE TO KLEBSIELLA PNEUMONIAE: ICD-10-CM

## 2020-10-15 DIAGNOSIS — N18.6 TYPE 2 DIABETES MELLITUS WITH END-STAGE RENAL DISEASE: ICD-10-CM

## 2020-10-15 DIAGNOSIS — N18.30 CHRONIC KIDNEY DISEASE, STAGE III (MODERATE): ICD-10-CM

## 2020-10-15 DIAGNOSIS — K63.2 FISTULA OF INTESTINE, EXCLUDING RECTUM AND ANUS: ICD-10-CM

## 2020-10-15 DIAGNOSIS — Z16.12 INFECTION DUE TO EXTENDED SPECTRUM BETA-LACTAMASE PRODUCING BACTERIA: ICD-10-CM

## 2020-10-15 DIAGNOSIS — B96.1 BACTEREMIA DUE TO KLEBSIELLA PNEUMONIAE: ICD-10-CM

## 2020-10-15 DIAGNOSIS — T81.49XA POSTOPERATIVE SUBPHRENIC ABSCESS: Primary | ICD-10-CM

## 2020-10-15 DIAGNOSIS — I12.9 PARENCHYMAL RENAL HYPERTENSION: ICD-10-CM

## 2020-10-15 DIAGNOSIS — E11.22 TYPE 2 DIABETES MELLITUS WITH END-STAGE RENAL DISEASE: ICD-10-CM

## 2020-10-15 LAB
ANION GAP SERPL CALC-SCNC: 13 MMOL/L (ref 8–16)
BUN SERPL-MCNC: 23 MG/DL (ref 8–23)
CALCIUM SERPL-MCNC: 10 MG/DL (ref 8.7–10.5)
CHLORIDE SERPL-SCNC: 106 MMOL/L (ref 95–110)
CO2 SERPL-SCNC: 20 MMOL/L (ref 23–29)
CREAT SERPL-MCNC: 1.3 MG/DL (ref 0.5–1.4)
EST. GFR  (AFRICAN AMERICAN): 48.4 ML/MIN/1.73 M^2
EST. GFR  (NON AFRICAN AMERICAN): 42 ML/MIN/1.73 M^2
GLUCOSE SERPL-MCNC: 116 MG/DL (ref 70–110)
POTASSIUM SERPL-SCNC: 3.9 MMOL/L (ref 3.5–5.1)
SODIUM SERPL-SCNC: 139 MMOL/L (ref 136–145)

## 2020-10-15 PROCEDURE — 80048 BASIC METABOLIC PNL TOTAL CA: CPT

## 2020-10-19 ENCOUNTER — OFFICE VISIT (OUTPATIENT)
Dept: SURGERY | Facility: CLINIC | Age: 70
End: 2020-10-19
Attending: COLON & RECTAL SURGERY
Payer: MEDICARE

## 2020-10-19 ENCOUNTER — OFFICE VISIT (OUTPATIENT)
Dept: WOUND CARE | Facility: CLINIC | Age: 70
End: 2020-10-19
Payer: MEDICARE

## 2020-10-19 VITALS
BODY MASS INDEX: 26.14 KG/M2 | HEART RATE: 100 BPM | SYSTOLIC BLOOD PRESSURE: 117 MMHG | WEIGHT: 138.44 LBS | DIASTOLIC BLOOD PRESSURE: 78 MMHG | HEIGHT: 61 IN

## 2020-10-19 DIAGNOSIS — R10.84 GENERALIZED ABDOMINAL PAIN: Primary | Chronic | ICD-10-CM

## 2020-10-19 DIAGNOSIS — Z43.2 ATTENTION TO ILEOSTOMY: Primary | ICD-10-CM

## 2020-10-19 LAB
FINAL PATHOLOGIC DIAGNOSIS: NORMAL
GROSS: NORMAL

## 2020-10-19 PROCEDURE — 99999 PR PBB SHADOW E&M-EST. PATIENT-LVL II: ICD-10-PCS | Mod: PBBFAC,,, | Performed by: CLINICAL NURSE SPECIALIST

## 2020-10-19 PROCEDURE — 99024 PR POST-OP FOLLOW-UP VISIT: ICD-10-PCS | Mod: S$GLB,,, | Performed by: CLINICAL NURSE SPECIALIST

## 2020-10-19 PROCEDURE — 99024 POSTOP FOLLOW-UP VISIT: CPT | Mod: S$GLB,,, | Performed by: CLINICAL NURSE SPECIALIST

## 2020-10-19 PROCEDURE — 99024 PR POST-OP FOLLOW-UP VISIT: ICD-10-PCS | Mod: S$GLB,,, | Performed by: COLON & RECTAL SURGERY

## 2020-10-19 PROCEDURE — 99999 PR PBB SHADOW E&M-EST. PATIENT-LVL V: ICD-10-PCS | Mod: PBBFAC,,, | Performed by: COLON & RECTAL SURGERY

## 2020-10-19 PROCEDURE — 99024 POSTOP FOLLOW-UP VISIT: CPT | Mod: S$GLB,,, | Performed by: COLON & RECTAL SURGERY

## 2020-10-19 PROCEDURE — 99999 PR PBB SHADOW E&M-EST. PATIENT-LVL V: CPT | Mod: PBBFAC,,, | Performed by: COLON & RECTAL SURGERY

## 2020-10-19 PROCEDURE — 99999 PR PBB SHADOW E&M-EST. PATIENT-LVL II: CPT | Mod: PBBFAC,,, | Performed by: CLINICAL NURSE SPECIALIST

## 2020-10-19 RX ORDER — OXYCODONE HYDROCHLORIDE 10 MG/1
10 TABLET ORAL EVERY 8 HOURS PRN
Qty: 21 TABLET | Refills: 0 | Status: ON HOLD | OUTPATIENT
Start: 2020-10-19 | End: 2021-02-09

## 2020-10-19 NOTE — PROGRESS NOTES
"This patient is unknown to me and is here in clinic today for first post op evaluation related to ileostomy. Surgery done in July by Dr. Valiente . The patient reports some problems with  new ostomy. The patient is receiving home health care with ????.   Pain level today is reported as  1.    The ileostomy is 25mm nkechi low budded stoma. In a crease  The patient is currently  wearing a 1piece pouching system by [employer]   Average wear time is 2 days.   Peristomal skin is scalded    There is no  mucocutaneous separation.  Pt is coping well with the new ostomy.  SUPPLIES/DME: ???  Pouching concerns include:                Patient instructions for pouching:  Cleanse skin with water, dry well.  Apply no sting skin barrier film . Allow to dry  Apply 2mm ring and  Pre cut 1"  Apply  pouch sized appropriately for stoma. Reviewed sizing of new stoma  belted  SPECIAL NEEDS:  Pt counseled on skin care, nutrition, hydration as well as  how to order ostomy supplies.  I spent greater than 50% of this 45 minute visit in face to face counseling.    "

## 2020-10-19 NOTE — Clinical Note
Hi-  I saw her today.  Thank you so much for your help with her.  I did remove her IR drain, as I do not see any residual fluid on CT from last week and the drain does not have any output.  I also think we could probably consider stopping her IV antibiotics, but I would leave that to you.  I am going to see her again in 3 weeks.

## 2020-10-19 NOTE — PATIENT INSTRUCTIONS
(1) 1 tablet of Imodium 30 min before each meal and 30 min before bedtime    (2) ask your home health nurse to order the new ostomy bag    (3) make an appointment with the chronic pain clinic    (4) See Dr Valiente in 3 weeks

## 2020-10-19 NOTE — PROGRESS NOTES
"CRS Office Visit Follow-up    SUBJECTIVE:     History of Present Illness:  Patient is a 70 y.o. 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 Their post-operative course was complicated by ileus and abscess requiring IR drain placement.  [facility], and was discharged home 1 month ago.  She continues to be followed by the infectious disease team and is on home meropenem.  Otherwise feels well.  Appetite has been increasing and she is eating better at home than she did at [facility].  Overall states that she has lost about 30 lb since surgery.  Ostomy output varies in quality between liquid and oatmeal consistency.  She is taking 2 Imodium in the morning.  Has a home health nurse, and does have some leaking from the ostomy from time to time.  Denies any drainage from the midline incision.  States that she has not had any drainage from the IR drain in a long time.  CT scan performed 1 week ago demonstrates no residual fluid collection that I can see.    Review of Systems:  Review of Systems   Constitutional: Positive for weight loss. Negative for chills and fever.   Gastrointestinal: Positive for abdominal pain and diarrhea.   Musculoskeletal: Positive for back pain.   All other systems reviewed and are negative.      OBJECTIVE:     Vital Signs (Most Recent)  /78 (BP Location: Left arm, Patient Position: Sitting, BP Method: Large (Automatic))   Pulse 100   Ht 5' 1" (1.549 m)   Wt 62.8 kg (138 lb 7.2 oz)   LMP  (LMP Unknown)   BMI 26.16 kg/m²     Physical Exam:  General:  or Other  female in no distress   Neuro: Alert and oriented x 4.  Moves all extremities.     HEENT: No icterus.  Trachea midline  Respiratory: Respirations are even and unlabored  Cardiac: " Regular rate  Abdomen:  Soft, nondistended.  Midline incision is well healed.  IR drain with no output in the bulb.  This was removed today in clinic.  Right lower quadrant ostomy pink and healthy with some skin breakdown 1-2 cm circumferentially around it.  She was seen by Kaye today as well.  Extremities: Warm dry and intact  Skin: No rashes    Imaging:   CT abd/pel (10/12/2020)  Postoperative bowel changes.  Stable location of percutaneous midline drain noting similar induration change with slight decreased size of previous small anterior abdominal collection just superior.  No definite new collection.  Similar trace volume intrapelvic fluid.  Stable appearing right lower lobe subcentimeter pulmonary nodule.  Subsequent follow-up as previously advised.      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  - IR drain removed today in clinic.  Will message infectious disease team about stopping meropenem.  - increase Imodium to 1 tablet before meals and before bedtime  - referral for chronic pain clinic placed.  She continues to take narcotics for pain around the ostomy and chronic back pain.  - return to clinic in 3 weeks  - discussed with patient that would plan for reversing ostomy at the end of January at the soonest.  Will need Gastrografin enema prior to reversal.    Fabiana Valiente MD  Staff Surgeon, Colon and Rectal Surgery  [facility]

## 2020-10-21 ENCOUNTER — DOCUMENT SCAN (OUTPATIENT)
Dept: HOME HEALTH SERVICES | Facility: HOSPITAL | Age: 70
End: 2020-10-21
Payer: MEDICARE

## 2020-10-22 ENCOUNTER — DOCUMENT SCAN (OUTPATIENT)
Dept: HOME HEALTH SERVICES | Facility: HOSPITAL | Age: 70
End: 2020-10-22
Payer: MEDICARE

## 2020-10-23 LAB
HPV HR 12 DNA SPEC QL NAA+PROBE: NEGATIVE
HPV16 AG SPEC QL: NEGATIVE
HPV18 DNA SPEC QL NAA+PROBE: NEGATIVE

## 2020-10-24 ENCOUNTER — HOSPITAL ENCOUNTER (OUTPATIENT)
Dept: RADIOLOGY | Facility: HOSPITAL | Age: 70
Discharge: HOME | End: 2020-10-24
Attending: OBSTETRICS & GYNECOLOGY
Payer: MEDICARE

## 2020-10-24 DIAGNOSIS — Z12.31 SCREENING MAMMOGRAM, ENCOUNTER FOR: ICD-10-CM

## 2020-10-24 DIAGNOSIS — Z01.419 ENCOUNTER FOR ANNUAL ROUTINE GYNECOLOGICAL EXAMINATION: ICD-10-CM

## 2020-10-24 PROCEDURE — 77067 MAMMO DIGITAL SCREENING BILAT WITH TOMO: ICD-10-PCS | Mod: 26,,, | Performed by: RADIOLOGY

## 2020-10-24 PROCEDURE — 77063 BREAST TOMOSYNTHESIS BI: CPT | Mod: 26,,, | Performed by: RADIOLOGY

## 2020-10-24 PROCEDURE — 77063 MAMMO DIGITAL SCREENING BILAT WITH TOMO: ICD-10-PCS | Mod: 26,,, | Performed by: RADIOLOGY

## 2020-10-24 PROCEDURE — 77067 SCR MAMMO BI INCL CAD: CPT | Mod: TC

## 2020-10-24 PROCEDURE — 77067 SCR MAMMO BI INCL CAD: CPT | Mod: 26,,, | Performed by: RADIOLOGY

## 2020-11-04 ENCOUNTER — DOCUMENT SCAN (OUTPATIENT)
Dept: HOME HEALTH SERVICES | Facility: HOSPITAL | Age: 70
End: 2020-11-04
Payer: MEDICARE

## 2020-11-04 ENCOUNTER — TELEPHONE (OUTPATIENT)
Dept: SURGERY | Facility: CLINIC | Age: 70
End: 2020-11-04

## 2020-11-04 DIAGNOSIS — Z93.2 ILEOSTOMY IN PLACE: Primary | ICD-10-CM

## 2020-11-04 NOTE — TELEPHONE ENCOUNTER
Spoke with patient's daughter. She spoke with Danielle Abbott NP already. Prescription for supplies was sent in but patient will have to provided company with numbers.

## 2020-11-04 NOTE — PROGRESS NOTES
Returned pt's daughter in law phone call. Has discontinued home health. Calling for rx for ostomy supplies. Rx sent to [facility]. Provided her with contact information to call and order supplies. She verbalized understanding.

## 2020-11-04 NOTE — TELEPHONE ENCOUNTER
----- Message from Jessie Demarco sent at 11/4/2020  8:28 AM CST -----  Regarding: questions  Contact: Azucena [phone]  Pt daughter is calling with questions about supplies for the pt . Please contact pt daughter

## 2020-11-06 ENCOUNTER — TELEPHONE (OUTPATIENT)
Dept: ENDOSCOPY | Facility: HOSPITAL | Age: 70
End: 2020-11-06

## 2020-11-06 NOTE — TELEPHONE ENCOUNTER
----- Message from Bruna Obrien sent at 11/6/2020  2:17 PM CST -----  Regarding: Pt Inquiry  Pt daughter in Law ( Azucena ) called inquiring on lab orders / process .      Azucena [phone]

## 2020-11-11 ENCOUNTER — TELEPHONE (OUTPATIENT)
Dept: OBSTETRICS AND GYNECOLOGY | Facility: CLINIC | Age: 70
End: 2020-11-11

## 2020-11-11 NOTE — TELEPHONE ENCOUNTER
Called patient and discussed tissue sample. Since there was only small fragments but some did have hyperplasia then would recommend D&C to make sure get a good tissue sample. If no atypia then nothing more we need to do. However discussed with her surgeon and they are planning on reversing her colostomy at the end of Jan, surgeon reports it was a difficult surgery to start with so if does have hyperplasia and needs hysterectomy then may be good to coordinate at the same time. Patient expressed understand. Also reports she has a labial bump that seems to be enlarging, appt scheduled to work that up.     Sent message to surgery scheduler to set up surgery.

## 2020-11-12 DIAGNOSIS — L98.8 CUTANEOUS FISTULA: Primary | ICD-10-CM

## 2020-11-12 NOTE — TELEPHONE ENCOUNTER
Will you please look into this pt to see if I sent the order in correctly?  I faxed to [employer]. Thanks

## 2020-11-14 LAB
FINAL PATHOLOGIC DIAGNOSIS: NORMAL
Lab: NORMAL

## 2020-11-16 ENCOUNTER — OFFICE VISIT (OUTPATIENT)
Dept: OBSTETRICS AND GYNECOLOGY | Facility: CLINIC | Age: 70
End: 2020-11-16
Payer: MEDICARE

## 2020-11-16 VITALS
WEIGHT: 139.69 LBS | DIASTOLIC BLOOD PRESSURE: 60 MMHG | HEIGHT: 61 IN | SYSTOLIC BLOOD PRESSURE: 110 MMHG | BODY MASS INDEX: 26.37 KG/M2

## 2020-11-16 DIAGNOSIS — L72.3 SEBACEOUS CYST: Primary | ICD-10-CM

## 2020-11-16 DIAGNOSIS — L29.2 VULVAR ITCHING: ICD-10-CM

## 2020-11-16 PROCEDURE — 99212 OFFICE O/P EST SF 10 MIN: CPT | Mod: S$GLB,,, | Performed by: OBSTETRICS & GYNECOLOGY

## 2020-11-16 PROCEDURE — 3288F FALL RISK ASSESSMENT DOCD: CPT | Mod: CPTII,S$GLB,, | Performed by: OBSTETRICS & GYNECOLOGY

## 2020-11-16 PROCEDURE — 1126F AMNT PAIN NOTED NONE PRSNT: CPT | Mod: S$GLB,,, | Performed by: OBSTETRICS & GYNECOLOGY

## 2020-11-16 PROCEDURE — 3078F PR MOST RECENT DIASTOLIC BLOOD PRESSURE < 80 MM HG: ICD-10-PCS | Mod: CPTII,S$GLB,, | Performed by: OBSTETRICS & GYNECOLOGY

## 2020-11-16 PROCEDURE — 3074F SYST BP LT 130 MM HG: CPT | Mod: CPTII,S$GLB,, | Performed by: OBSTETRICS & GYNECOLOGY

## 2020-11-16 PROCEDURE — 99212 PR OFFICE/OUTPT VISIT, EST, LEVL II, 10-19 MIN: ICD-10-PCS | Mod: S$GLB,,, | Performed by: OBSTETRICS & GYNECOLOGY

## 2020-11-16 PROCEDURE — 1159F MED LIST DOCD IN RCRD: CPT | Mod: S$GLB,,, | Performed by: OBSTETRICS & GYNECOLOGY

## 2020-11-16 PROCEDURE — 3288F PR FALLS RISK ASSESSMENT DOCUMENTED: ICD-10-PCS | Mod: CPTII,S$GLB,, | Performed by: OBSTETRICS & GYNECOLOGY

## 2020-11-16 PROCEDURE — 1101F PT FALLS ASSESS-DOCD LE1/YR: CPT | Mod: CPTII,S$GLB,, | Performed by: OBSTETRICS & GYNECOLOGY

## 2020-11-16 PROCEDURE — 1159F PR MEDICATION LIST DOCUMENTED IN MEDICAL RECORD: ICD-10-PCS | Mod: S$GLB,,, | Performed by: OBSTETRICS & GYNECOLOGY

## 2020-11-16 PROCEDURE — 99999 PR PBB SHADOW E&M-EST. PATIENT-LVL III: CPT | Mod: PBBFAC,,, | Performed by: OBSTETRICS & GYNECOLOGY

## 2020-11-16 PROCEDURE — 3008F PR BODY MASS INDEX (BMI) DOCUMENTED: ICD-10-PCS | Mod: CPTII,S$GLB,, | Performed by: OBSTETRICS & GYNECOLOGY

## 2020-11-16 PROCEDURE — 99999 PR PBB SHADOW E&M-EST. PATIENT-LVL III: ICD-10-PCS | Mod: PBBFAC,,, | Performed by: OBSTETRICS & GYNECOLOGY

## 2020-11-16 PROCEDURE — 3074F PR MOST RECENT SYSTOLIC BLOOD PRESSURE < 130 MM HG: ICD-10-PCS | Mod: CPTII,S$GLB,, | Performed by: OBSTETRICS & GYNECOLOGY

## 2020-11-16 PROCEDURE — 3008F BODY MASS INDEX DOCD: CPT | Mod: CPTII,S$GLB,, | Performed by: OBSTETRICS & GYNECOLOGY

## 2020-11-16 PROCEDURE — 1126F PR PAIN SEVERITY QUANTIFIED, NO PAIN PRESENT: ICD-10-PCS | Mod: S$GLB,,, | Performed by: OBSTETRICS & GYNECOLOGY

## 2020-11-16 PROCEDURE — 3078F DIAST BP <80 MM HG: CPT | Mod: CPTII,S$GLB,, | Performed by: OBSTETRICS & GYNECOLOGY

## 2020-11-16 PROCEDURE — 1101F PR PT FALLS ASSESS DOC 0-1 FALLS W/OUT INJ PAST YR: ICD-10-PCS | Mod: CPTII,S$GLB,, | Performed by: OBSTETRICS & GYNECOLOGY

## 2020-11-16 RX ORDER — TRIAMCINOLONE ACETONIDE 1 MG/G
CREAM TOPICAL 2 TIMES DAILY
Qty: 30 G | Refills: 0 | Status: ON HOLD | OUTPATIENT
Start: 2020-11-16 | End: 2021-02-09

## 2020-11-16 RX ORDER — CYPROHEPTADINE HYDROCHLORIDE 4 MG/1
TABLET ORAL
Status: ON HOLD | COMMUNITY
Start: 2020-10-05 | End: 2021-02-09

## 2020-11-16 NOTE — PROGRESS NOTES
Chief Complaint   Patient presents with    Vulvar Itch       HISTORY OF PRESENT ILLNESS:   Azucena Morrison is a 70 y.o. female  who presents for bumps on vagina.  No LMP recorded (lmp unknown). Patient is postmenopausal..  She reports doesn't seem to be changing in size though is having itching all over vulva, has been going on for a while.      Past Medical History:   Diagnosis Date    Chronic kidney disease, stage 3     Diabetes mellitus     Diabetes mellitus, type 2     Hypertension     Malnutrition of moderate degree 2020    UTI (urinary tract infection) 2020   ilat  Past Surgical History:   Procedure Laterality Date    CATHETERIZATION OF URETER Bilateral 2020    Procedure: CATHETERIZATION, URETER;  Surgeon: Kvng Cunningham MD;  Location: [facility] OR [address] [place] [address];  Service: Urology;  Laterality: Bilateral;     SECTION, CLASSIC      x2    CHOLECYSTECTOMY      COLON SURGERY      [facility]    COLONOSCOPY N/A 2018    Procedure: COLONOSCOPY;  Surgeon: Gibran Aguayo MD;  Location: [facility];  Service: Endoscopy;  Laterality: N/A;    COLOSTOMY Left     CYSTOSCOPY N/A 2020    Procedure: CYSTOSCOPY;  Surgeon: Kvng Cunningham MD;  Location: [address];  Service: Urology;  Laterality: N/A;    CYSTOSCOPY WITH URETEROSCOPY, RETROGRADE PYELOGRAPHY, AND INSERTION OF STENT Left 2019    Procedure: CYSTOSCOPY, WITH RETROGRADE PYELOGRAM AND URETERAL STENT INSERTION with placement of a muir cath;  Surgeon: Ulisses Horton MD;  Location: [facility];  Service: General;  Laterality: Left;    ILEOSTOMY  2020    Procedure: CREATION, ILEOSTOMY;  Surgeon: Fabiana Valiente MD;  [facility];  Service: Colon and Rectal;;    INCISION AND DRAINAGE OF ABSCESS N/A 2019    Procedure: INCISION AND DRAINAGE, ABSCESS;  Surgeon: Ulisses Horton MD;  Location: [facility];  Service: General;  Laterality: N/A;    INCISION OF ABDOMINAL WALL N/A  8/10/2018    Procedure: INCISION, ABDOMINAL WALL;  Surgeon: Ulisses Horton MD;  Location: [facility] OR;  Service: General;  Laterality: N/A;    INCISIONAL HERNIA REPAIR Right     LOW ANTERIOR RESECTION OF COLON N/A 2020    Procedure: RESECTION, COLON, LOW ANTERIOR;  Surgeon: Fabiana Valiente MD;  Location: [facility] OR [facility];  Service: Colon and Rectal;  Laterality: N/A;    LYSIS OF ADHESIONS  2020    Procedure: LYSIS, ADHESIONS;  Surgeon: Fabiana Valiente MD;  Location: [facility] OR 2ND FLR;  Service: Colon and Rectal;;   eral;     SECTION, CLASSIC      x2    CHOLECYSTECTOMY      COLON SURGERY      [facility]    COLONOSCOPY N/A 2018    Procedure: COLONOSCOPY;  Surgeon: Gibran Aguayo MD;  Location: [facility];  Service: Endoscopy;  Laterality: N/A;    COLOSTOMY Left     CYSTOSCOPY N/A 2020    Procedure: CYSTOSCOPY;  Surgeon: Kvng Cunningham MD;  [facility];  Service: Urology;  Laterality: N/A;    CYSTOSCOPY WITH URETEROSCOPY, RETROGRADE PYELOGRAPHY, AND INSERTION OF STENT Left 2019    Procedure: CYSTOSCOPY, WITH RETROGRADE PYELOGRAM AND URETERAL STENT INSERTION with placement of a muir cath;  Surgeon: Ulisses Horton MD;  Location: [facility] OR;  Service: General;  Laterality: Left;    ILEOSTOMY  2020    Procedure: CREATION, ILEOSTOMY;  Surgeon: Fabiana Valiente MD;  [facility];  Service: Colon and Rectal;;    INCISION AND DRAINAGE OF ABSCESS N/A 2019    Procedure: INCISION AND DRAINAGE, ABSCESS;  Surgeon: Ulisses Horton MD;  Location: [facility] OR;  Service: General;  Laterality: N/A;    INCISION OF ABDOMINAL WALL N/A 8/10/2018    Procedure: INCISION, ABDOMINAL WALL;  Surgeon: Ulisses Horton MD;  Location: [facility] OR;  Service: General;  Laterality: N/A;    INCISIONAL HERNIA REPAIR Right 2010    LOW ANTERIOR RESECTION OF COLON N/A 2020    Procedure: RESECTION, COLON, LOW ANTERIOR;  Surgeon:  Fabiana Valiente MD;  Location: [facility] OR 2ND FLR;  Service: Colon and Rectal;  Laterality: N/A;    LYSIS OF ADHESIONS  2020    Procedure: LYSIS, ADHESIONS;  Surgeon: Fabiana Valiente MD;  Location: [facility] OR 2ND FLR;  Service: Colon and Rectal;;        Socioeconomic History    Marital status: Single     Spouse name: Not on file    Number of children: Not on file    Years of education: Not on file    Highest education level: Not on file   Occupational History    Not on file   Social Needs    Financial resource strain: Not on file    Food insecurity     Worry: Not on file     Inability: Not on file    Transportation needs     Medical: Not on file     Non-medical: Not on file   Tobacco Use    Smoking status: Former Smoker     Types: Cigarettes     Quit date: 2000     Years since quittin.8    Smokeless tobacco: Never Used   Substance and Sexual Activity    Alcohol use: No    Drug use: No    Sexual activity: Not Currently   Lifestyle    Physical activity     Days per week: Not on file     Minutes per session: Not on file    Stress: Not on file   Relationships    Social connections     Talks on phone: Not on file     Gets together: Not on file     Attends Jainism service: Not on file     Active member of club or organization: Not on file     Attends meetings of clubs or organizations: Not on file     Relationship status: Not on file   Other Topics Concern    Not on file   Social History Narrative    Not on file       Family History   Problem Relation Age of Onset    Stroke Mother     Heart disease Mother     Hyperlipidemia Mother     Hypertension Mother     Alcohol abuse Father     Stroke Sister     Diabetes Sister     Heart disease Sister     Hyperlipidemia Sister     Hypertension Sister     Diabetes Brother     Early death Grandchild          OB History    Para Term  AB Living   2 2 2         SAB TAB Ectopic Multiple Live Births                  # Outcome  "Date GA Lbr Rakesh/2nd Weight Sex Delivery Anes PTL Lv   2 Term            1 Term                COMPREHENSIVE GYN HISTORY:  PAP History: Denies abnormal Paps  Infection History: Denies STDs. Denies PID  Benign History: Denies uterine fibroids. Denies ovarian cysts. Denies endometriosis Denies other conditions.  Cancer History: Denies cervical cancer. Denies uterine cancer or hyperplasia. Denies ovarian cancer. Denies vulvar cancer or pre-cancer. Denies vaginal cancer or pre-cancer. Denies breast cancer. Denies colon cancer.  Cycle: 17/irregular Q few months; stopped at 33 yo     ROS:  GENERAL: Denies weight gain or weight loss. Feeling well overall.   SKIN: Denies rash or lesions.   HEAD: Denies headache.   NODES: Denies enlarged lymph nodes.   CHEST: Denies shortness of breath.   ABDOMEN: No abdominal pain, constipation, diarrhea, nausea, vomiting or rectal bleeding.   URINARY: No frequency, dysuria, hematuria, or burning on urination.  REPRODUCTIVE: See HPI.   BREASTS: The patient denies pain, lumps, or nipple discharge.       /60   Ht 5' 1" (1.549 m)   Wt 63.3 kg (139 lb 10.6 oz)   LMP  (LMP Unknown)   BMI 26.39 kg/m²     APPEARANCE: Well nourished, well developed, in no acute distress.    PELVIC:   VULVA: 2 4mm fim papules on left and right lower labia majora with yellowish appearance. No tenderness or erythema or fluctuation.  Normal female genitalia.  URETHRAL MEATUS: Normal size and location, no lesions, no prolapse.  URETHRA: No masses, tenderness, prolapse or scarring.  VAGINA: Matrophic, no discharge, no significant cystocele or rectocele. No bleeding noted   CERVIX: No lesions and discharge.  UTERUS: Normal size, regular shape, mobile, non-tender, bladder base nontender.  ADNEXA: No masses or tenderness.  PERINEUM: Normal, no masses or lesions             1. Sebaceous cyst    2. Vulvar itching        Plan: 1. Lesions most consistent with sebaccous cysts, are not infected and reports has been there " a few years. Discussed with her that options would be to leave alone and monitor or remove when we do her [facility] D&C. She is going to think about it.   2. No obvious yeast, possibly a lichen condition, will do topical steroids and punch biopsy when do her D&C

## 2020-11-24 NOTE — PROGRESS NOTES
"CRS Office Visit Follow-up    SUBJECTIVE:     History of Present Illness:  Patient is a 70 y.o. 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 Their post-operative course was complicated by ileus and abscess requiring IR drain placement.  She went to [facility] post-op, and was discharged home in Sept 2020.    IR drain removed last visit.  No fevers or chills.  Has been eating well, and is able to keep her ostomy bag gone without any episodes of leakage.  She is taking Imodium, 2 tablets b.i.d..  Continues to have output relatively quickly after eating.  Weight has been stable.    Is also undergoing endometrial biopsy with gynecology.  I have been in contact with her physician.    Review of Systems:  Review of Systems   Constitutional: Negative for chills, fever and weight loss.   Gastrointestinal: Positive for diarrhea. Negative for abdominal pain.   All other systems reviewed and are negative.      OBJECTIVE:     Vital Signs (Most Recent)  BP (!) 153/87 (BP Location: Right arm, Patient Position: Sitting, BP Method: Large (Automatic))   Pulse 102   Ht 5' 2" (1.575 m)   Wt 62.9 kg (138 lb 10.7 oz)   LMP  (LMP Unknown)   BMI 25.36 kg/m²     Physical Exam:  General:  or Other  female in no distress   Neuro: Alert and oriented x 4.  Moves all extremities.     HEENT: No icterus.  Trachea midline  Respiratory: Respirations are even and unlabored  Cardiac: Regular rate  Abdomen:  Soft, nondistended.  Midline incision is well healed.   Right lower quadrant ostomy pink and healthy  Extremities: Warm dry and intact  Skin: No rashes    Imaging:   CT abd/pel (10/12/2020)  Postoperative bowel changes.  Stable location of percutaneous midline drain noting similar induration change with " slight decreased size of previous small anterior abdominal collection just superior.  No definite new collection.  Similar trace volume intrapelvic fluid.  Stable appearing right lower lobe subcentimeter pulmonary nodule.  Subsequent follow-up as previously advised.      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  - labs today  - follow-up results of endometrial biopsy  - return to clinic in January with Gastrografin enema that time    Fabiana Valiente MD  Staff Surgeon, Colon and Rectal Surgery  [facility]

## 2020-11-25 ENCOUNTER — OFFICE VISIT (OUTPATIENT)
Dept: SURGERY | Facility: CLINIC | Age: 70
End: 2020-11-25
Attending: COLON & RECTAL SURGERY
Payer: MEDICARE

## 2020-11-25 ENCOUNTER — LAB VISIT (OUTPATIENT)
Dept: LAB | Facility: HOSPITAL | Age: 70
End: 2020-11-25
Attending: COLON & RECTAL SURGERY
Payer: MEDICARE

## 2020-11-25 ENCOUNTER — TELEPHONE (OUTPATIENT)
Dept: SURGERY | Facility: CLINIC | Age: 70
End: 2020-11-25

## 2020-11-25 VITALS
HEART RATE: 102 BPM | BODY MASS INDEX: 25.52 KG/M2 | HEIGHT: 62 IN | DIASTOLIC BLOOD PRESSURE: 87 MMHG | SYSTOLIC BLOOD PRESSURE: 153 MMHG | WEIGHT: 138.69 LBS

## 2020-11-25 DIAGNOSIS — Z93.2 ILEOSTOMY IN PLACE: ICD-10-CM

## 2020-11-25 DIAGNOSIS — E44.1 MILD PROTEIN-CALORIE MALNUTRITION: ICD-10-CM

## 2020-11-25 DIAGNOSIS — Z93.2 ILEOSTOMY IN PLACE: Primary | ICD-10-CM

## 2020-11-25 LAB
ALBUMIN SERPL BCP-MCNC: 3.8 G/DL (ref 3.5–5.2)
ALP SERPL-CCNC: 97 U/L (ref 55–135)
ALT SERPL W/O P-5'-P-CCNC: 22 U/L (ref 10–44)
ANION GAP SERPL CALC-SCNC: 11 MMOL/L (ref 8–16)
AST SERPL-CCNC: 22 U/L (ref 10–40)
BASOPHILS # BLD AUTO: 0.04 K/UL (ref 0–0.2)
BASOPHILS NFR BLD: 0.6 % (ref 0–1.9)
BILIRUB SERPL-MCNC: 0.4 MG/DL (ref 0.1–1)
BUN SERPL-MCNC: 16 MG/DL (ref 8–23)
CALCIUM SERPL-MCNC: 9.8 MG/DL (ref 8.7–10.5)
CHLORIDE SERPL-SCNC: 104 MMOL/L (ref 95–110)
CO2 SERPL-SCNC: 27 MMOL/L (ref 23–29)
CREAT SERPL-MCNC: 1.2 MG/DL (ref 0.5–1.4)
DIFFERENTIAL METHOD: ABNORMAL
EOSINOPHIL # BLD AUTO: 0.1 K/UL (ref 0–0.5)
EOSINOPHIL NFR BLD: 1 % (ref 0–8)
ERYTHROCYTE [DISTWIDTH] IN BLOOD BY AUTOMATED COUNT: 15.2 % (ref 11.5–14.5)
EST. GFR  (AFRICAN AMERICAN): 53.3 ML/MIN/1.73 M^2
EST. GFR  (NON AFRICAN AMERICAN): 46.2 ML/MIN/1.73 M^2
GLUCOSE SERPL-MCNC: 115 MG/DL (ref 70–110)
HCT VFR BLD AUTO: 34.2 % (ref 37–48.5)
HGB BLD-MCNC: 11.6 G/DL (ref 12–16)
IMM GRANULOCYTES # BLD AUTO: 0.01 K/UL (ref 0–0.04)
IMM GRANULOCYTES NFR BLD AUTO: 0.1 % (ref 0–0.5)
LYMPHOCYTES # BLD AUTO: 1.9 K/UL (ref 1–4.8)
LYMPHOCYTES NFR BLD: 26 % (ref 18–48)
MCH RBC QN AUTO: 28.4 PG (ref 27–31)
MCHC RBC AUTO-ENTMCNC: 33.9 G/DL (ref 32–36)
MCV RBC AUTO: 84 FL (ref 82–98)
MONOCYTES # BLD AUTO: 0.4 K/UL (ref 0.3–1)
MONOCYTES NFR BLD: 5.5 % (ref 4–15)
NEUTROPHILS # BLD AUTO: 4.8 K/UL (ref 1.8–7.7)
NEUTROPHILS NFR BLD: 66.8 % (ref 38–73)
NRBC BLD-RTO: 0 /100 WBC
PLATELET # BLD AUTO: 311 K/UL (ref 150–350)
PMV BLD AUTO: 8.4 FL (ref 9.2–12.9)
POTASSIUM SERPL-SCNC: 3.2 MMOL/L (ref 3.5–5.1)
PROT SERPL-MCNC: 8.6 G/DL (ref 6–8.4)
RBC # BLD AUTO: 4.08 M/UL (ref 4–5.4)
SODIUM SERPL-SCNC: 142 MMOL/L (ref 136–145)
WBC # BLD AUTO: 7.24 K/UL (ref 3.9–12.7)

## 2020-11-25 PROCEDURE — 36415 COLL VENOUS BLD VENIPUNCTURE: CPT

## 2020-11-25 PROCEDURE — 3008F PR BODY MASS INDEX (BMI) DOCUMENTED: ICD-10-PCS | Mod: CPTII,S$GLB,, | Performed by: COLON & RECTAL SURGERY

## 2020-11-25 PROCEDURE — 99999 PR PBB SHADOW E&M-EST. PATIENT-LVL IV: ICD-10-PCS | Mod: PBBFAC,,, | Performed by: COLON & RECTAL SURGERY

## 2020-11-25 PROCEDURE — 3077F SYST BP >= 140 MM HG: CPT | Mod: CPTII,S$GLB,, | Performed by: COLON & RECTAL SURGERY

## 2020-11-25 PROCEDURE — 1125F AMNT PAIN NOTED PAIN PRSNT: CPT | Mod: S$GLB,,, | Performed by: COLON & RECTAL SURGERY

## 2020-11-25 PROCEDURE — 3077F PR MOST RECENT SYSTOLIC BLOOD PRESSURE >= 140 MM HG: ICD-10-PCS | Mod: CPTII,S$GLB,, | Performed by: COLON & RECTAL SURGERY

## 2020-11-25 PROCEDURE — 82607 VITAMIN B-12: CPT

## 2020-11-25 PROCEDURE — 84630 ASSAY OF ZINC: CPT

## 2020-11-25 PROCEDURE — 1159F PR MEDICATION LIST DOCUMENTED IN MEDICAL RECORD: ICD-10-PCS | Mod: S$GLB,,, | Performed by: COLON & RECTAL SURGERY

## 2020-11-25 PROCEDURE — 99999 PR PBB SHADOW E&M-EST. PATIENT-LVL IV: CPT | Mod: PBBFAC,,, | Performed by: COLON & RECTAL SURGERY

## 2020-11-25 PROCEDURE — 99212 OFFICE O/P EST SF 10 MIN: CPT | Mod: S$GLB,,, | Performed by: COLON & RECTAL SURGERY

## 2020-11-25 PROCEDURE — 1101F PR PT FALLS ASSESS DOC 0-1 FALLS W/OUT INJ PAST YR: ICD-10-PCS | Mod: CPTII,S$GLB,, | Performed by: COLON & RECTAL SURGERY

## 2020-11-25 PROCEDURE — 3288F PR FALLS RISK ASSESSMENT DOCUMENTED: ICD-10-PCS | Mod: CPTII,S$GLB,, | Performed by: COLON & RECTAL SURGERY

## 2020-11-25 PROCEDURE — 85025 COMPLETE CBC W/AUTO DIFF WBC: CPT

## 2020-11-25 PROCEDURE — 1125F PR PAIN SEVERITY QUANTIFIED, PAIN PRESENT: ICD-10-PCS | Mod: S$GLB,,, | Performed by: COLON & RECTAL SURGERY

## 2020-11-25 PROCEDURE — 3288F FALL RISK ASSESSMENT DOCD: CPT | Mod: CPTII,S$GLB,, | Performed by: COLON & RECTAL SURGERY

## 2020-11-25 PROCEDURE — 1159F MED LIST DOCD IN RCRD: CPT | Mod: S$GLB,,, | Performed by: COLON & RECTAL SURGERY

## 2020-11-25 PROCEDURE — 1101F PT FALLS ASSESS-DOCD LE1/YR: CPT | Mod: CPTII,S$GLB,, | Performed by: COLON & RECTAL SURGERY

## 2020-11-25 PROCEDURE — 3079F DIAST BP 80-89 MM HG: CPT | Mod: CPTII,S$GLB,, | Performed by: COLON & RECTAL SURGERY

## 2020-11-25 PROCEDURE — 83921 ORGANIC ACID SINGLE QUANT: CPT

## 2020-11-25 PROCEDURE — 99212 PR OFFICE/OUTPT VISIT, EST, LEVL II, 10-19 MIN: ICD-10-PCS | Mod: S$GLB,,, | Performed by: COLON & RECTAL SURGERY

## 2020-11-25 PROCEDURE — 80053 COMPREHEN METABOLIC PANEL: CPT

## 2020-11-25 PROCEDURE — 3079F PR MOST RECENT DIASTOLIC BLOOD PRESSURE 80-89 MM HG: ICD-10-PCS | Mod: CPTII,S$GLB,, | Performed by: COLON & RECTAL SURGERY

## 2020-11-25 PROCEDURE — 3008F BODY MASS INDEX DOCD: CPT | Mod: CPTII,S$GLB,, | Performed by: COLON & RECTAL SURGERY

## 2020-11-25 RX ORDER — CARVEDILOL 25 MG/1
TABLET ORAL
COMMUNITY
Start: 2020-10-19 | End: 2021-11-18

## 2020-11-25 RX ORDER — CICLOPIROX 80 MG/ML
SOLUTION TOPICAL
COMMUNITY
Start: 2020-10-05 | End: 2020-12-03

## 2020-11-25 RX ORDER — POTASSIUM CHLORIDE 750 MG/1
TABLET, EXTENDED RELEASE ORAL
COMMUNITY
Start: 2020-10-05 | End: 2021-02-07 | Stop reason: CLARIF

## 2020-11-27 LAB — VIT B12 SERPL-MCNC: 250 NG/L (ref 180–914)

## 2020-11-30 DIAGNOSIS — Z00.00 ROUTINE GENERAL MEDICAL EXAMINATION AT A HEALTH CARE FACILITY: Primary | ICD-10-CM

## 2020-11-30 LAB — ZINC SERPL-MCNC: 60 UG/DL (ref 60–130)

## 2020-12-01 LAB — METHYLMALONATE SERPL-SCNC: 0.26 NMOL/ML

## 2020-12-03 ENCOUNTER — OFFICE VISIT (OUTPATIENT)
Dept: OBSTETRICS AND GYNECOLOGY | Facility: CLINIC | Age: 70
End: 2020-12-03
Payer: MEDICARE

## 2020-12-03 VITALS
BODY MASS INDEX: 25.4 KG/M2 | WEIGHT: 138 LBS | SYSTOLIC BLOOD PRESSURE: 120 MMHG | DIASTOLIC BLOOD PRESSURE: 70 MMHG | HEIGHT: 62 IN

## 2020-12-03 DIAGNOSIS — Z01.818 PREOPERATIVE EXAM FOR GYNECOLOGIC SURGERY: Primary | ICD-10-CM

## 2020-12-03 DIAGNOSIS — N95.0 PMB (POSTMENOPAUSAL BLEEDING): ICD-10-CM

## 2020-12-03 PROCEDURE — 3008F BODY MASS INDEX DOCD: CPT | Mod: CPTII,S$GLB,, | Performed by: OBSTETRICS & GYNECOLOGY

## 2020-12-03 PROCEDURE — 1101F PR PT FALLS ASSESS DOC 0-1 FALLS W/OUT INJ PAST YR: ICD-10-PCS | Mod: CPTII,S$GLB,, | Performed by: OBSTETRICS & GYNECOLOGY

## 2020-12-03 PROCEDURE — 3008F PR BODY MASS INDEX (BMI) DOCUMENTED: ICD-10-PCS | Mod: CPTII,S$GLB,, | Performed by: OBSTETRICS & GYNECOLOGY

## 2020-12-03 PROCEDURE — 99499 UNLISTED E&M SERVICE: CPT | Mod: S$GLB,,, | Performed by: OBSTETRICS & GYNECOLOGY

## 2020-12-03 PROCEDURE — 99999 PR PBB SHADOW E&M-EST. PATIENT-LVL III: ICD-10-PCS | Mod: PBBFAC,,, | Performed by: OBSTETRICS & GYNECOLOGY

## 2020-12-03 PROCEDURE — 1126F PR PAIN SEVERITY QUANTIFIED, NO PAIN PRESENT: ICD-10-PCS | Mod: S$GLB,,, | Performed by: OBSTETRICS & GYNECOLOGY

## 2020-12-03 PROCEDURE — 99499 NO LOS: ICD-10-PCS | Mod: S$GLB,,, | Performed by: OBSTETRICS & GYNECOLOGY

## 2020-12-03 PROCEDURE — 1101F PT FALLS ASSESS-DOCD LE1/YR: CPT | Mod: CPTII,S$GLB,, | Performed by: OBSTETRICS & GYNECOLOGY

## 2020-12-03 PROCEDURE — 1126F AMNT PAIN NOTED NONE PRSNT: CPT | Mod: S$GLB,,, | Performed by: OBSTETRICS & GYNECOLOGY

## 2020-12-03 PROCEDURE — 3288F FALL RISK ASSESSMENT DOCD: CPT | Mod: CPTII,S$GLB,, | Performed by: OBSTETRICS & GYNECOLOGY

## 2020-12-03 PROCEDURE — 99999 PR PBB SHADOW E&M-EST. PATIENT-LVL III: CPT | Mod: PBBFAC,,, | Performed by: OBSTETRICS & GYNECOLOGY

## 2020-12-03 PROCEDURE — 3288F PR FALLS RISK ASSESSMENT DOCUMENTED: ICD-10-PCS | Mod: CPTII,S$GLB,, | Performed by: OBSTETRICS & GYNECOLOGY

## 2020-12-03 RX ORDER — MUPIROCIN 20 MG/G
OINTMENT TOPICAL
Status: CANCELLED | OUTPATIENT
Start: 2020-12-03

## 2020-12-03 RX ORDER — SODIUM CHLORIDE 9 MG/ML
INJECTION, SOLUTION INTRAVENOUS CONTINUOUS
Status: CANCELLED | OUTPATIENT
Start: 2020-12-03

## 2020-12-03 NOTE — PROGRESS NOTES
Diagnosis: PMB, endometrial hyperplasia without atypia  Planned Procedure: [facility] D&C with vulvar biopsy and excision of sebaceous cyst   Date of Planned Procedure: 20    Cc: I am here for preop for my surgery    HPI: Azucena Morrison is a 70 y.o. female with history of PMB with EMB with scant tissue but some endometrial hyperplasia. She also has 2 sebaceous cysts on her vulva that she would like removed.      ROS:  GENERAL: Denies weight gain or weight loss. Feeling well overall.   SKIN: Denies rash or lesions.   HEAD: Denies head injury or headache.   CHEST: Denies chest pain or shortness of breath.   CARDIOVASCULAR: Denies palpitations or left sided chest pain.   ABDOMEN: No abdominal pain, constipation, diarrhea, nausea, vomiting or rectal bleeding.   URINARY: No frequency, dysuria, hematuria, or burning on urination.  REPRODUCTIVE: See HPI.   HEMATOLOGIC: No easy bruisability or excessive bleeding.   MUSCULOSKELETAL: Denies joint pain or swelling.   NEUROLOGIC: Denies syncope or weakness.   PSYCHIATRIC: Denies depression, anxiety or mood swings.     PMHx:   Past Medical History:   Diagnosis Date    Chronic kidney disease, stage 3     Diabetes mellitus     Diabetes mellitus, type 2     Hypertension     Malnutrition of moderate degree 2020    UTI (urinary tract infection) 2020       Surgical hx:   Past Surgical History:   Procedure Laterality Date    CATHETERIZATION OF URETER Bilateral 2020    Procedure: CATHETERIZATION, URETER;  Surgeon: Kvng Cunningham MD;  Location: [address];  Service: Urology;  Laterality: Bilateral;     SECTION, CLASSIC      x2    CHOLECYSTECTOMY      COL[facility] SURGERY      [facility]    COLONOSCOPY N/A 2018    Procedure: COLONOSCOPY;  Surgeon: Gibran Aguayo MD;  Location: [facility];  Service: Endoscopy;  Laterality: N/A;    COLOSTOMY Left     CYSTOSCOPY N/A 2020    Procedure: CYSTOSCOPY;  Surgeon: Kvng Cunningham MD;   Location: [facility] OR 2ND FLR;  Service: Urology;  Laterality: N/A;    CYSTOSCOPY WITH URETEROSCOPY, RETROGRADE PYELOGRAPHY, AND INSERTION OF STENT Left 2019    Procedure: CYSTOSCOPY, WITH RETROGRADE PYELOGRAM AND URETERAL STENT INSERTION with placement of a muir cath;  Surgeon: Ulisses Horton MD;  Location: [facility] OR;  Service: General;  Laterality: Left;    ILEOSTOMY  2020    Procedure: CREATION, ILEOSTOMY;  Surgeon: Fabiana Valiente MD;  Location: [facility] OR 2ND FLR;  Service: Colon and Rectal;;    INCISION AND DRAINAGE OF ABSCESS N/A 2019    Procedure: INCISION AND DRAINAGE, ABSCESS;  Surgeon: Ulisses Horton MD;  Location: [facility] OR;  Service: General;  Laterality: N/A;    INCISION OF ABDOMINAL WALL N/A 8/10/2018    Procedure: INCISION, ABDOMINAL WALL;  Surgeon: Ulisses Horton MD;  Location: [facility] OR;  Service: General;  Laterality: N/A;    INCISIONAL HERNIA REPAIR Right     LOW ANTERIOR RESECTION OF COLON N/A 2020    Procedure: RESECTION, COLON, LOW ANTERIOR;  Surgeon: Fabiana Valiente MD;  Location: [facility] OR 2ND FLR;  Service: Colon and Rectal;  Laterality: N/A;    LYSIS OF ADHESIONS  2020    Procedure: LYSIS, ADHESIONS;  Surgeon: Fabiana Valiente MD;  Location: [facility] OR 2ND FLR;  Service: Colon and Rectal;;       GYNhx: No LMP recorded (lmp unknown). Patient is postmenopausal.    Obhx:     Review of patient's allergies indicates:   Allergen Reactions    Chlorhexidine gluconate Rash     Chlorhexidine/alcohol wipes. Rash and blistering.       MEDS: Reviewed, reconciled      Current Outpatient Medications:     acetaminophen (TYLENOL) 325 MG tablet, Take 2 tablets (650 mg total) by mouth every 8 (eight) hours as needed., Disp: , Rfl: 0    ascorbic acid, vitamin C, (VITAMIN C) 500 MG tablet, Take 1 tablet (500 mg total) by mouth once daily., Disp:  , Rfl:     carvediloL (COREG) 12.5 MG tablet, Take 1 tablet (12.5 mg total) by mouth 2 (two) times daily.,  Disp: 60 tablet, Rfl: 11    carvediloL (COREG) 25 MG tablet, , Disp: , Rfl:     clotrimazole-betamethasone 1-0.05% (LOTRISONE) cream, Apply topically 2 (two) times daily., Disp: 45 g, Rfl: 1    cyproheptadine (PERIACTIN) 4 mg tablet, , Disp: , Rfl:     gabapentin (NEURONTIN) 300 MG capsule, Take 1 capsule (300 mg total) by mouth 3 (three) times daily., Disp: 90 capsule, Rfl: 11    triamcinolone acetonide 0.1% (KENALOG) 0.1 % cream, Apply topically 2 (two) times daily., Disp: 30 g, Rfl: 0    allopurinoL (ZYLOPRIM) 100 MG tablet, Take 1 tablet (100 mg total) by mouth once daily., Disp: 30 tablet, Rfl: 3    hydroCHLOROthiazide (HYDRODIURIL) 25 MG tablet, Take 1 tablet (25 mg total) by mouth once daily. (Patient not taking: Reported on 12/3/2020), Disp: 30 tablet, Rfl: 3    magnesium oxide (MAG-OX) 400 mg (241.3 mg magnesium) tablet, Take 1 tablet (400 mg total) by mouth 2 (two) times daily. (Patient not taking: Reported on 12/3/2020), Disp: , Rfl: 0    megestroL (MEGACE) 40 MG Tab, Take 1 tablet (40 mg total) by mouth 3 (three) times daily before meals. (Patient not taking: Reported on 12/3/2020.), Disp: 90 tablet, Rfl: 11    MEROPENEM 1 G/100 ML NS, READY TO MIX SYSTEM,, Inject 100 mLs (1 g total) into the vein every 12 (twelve) hours. (Patient not taking: Reported on 12/3/2020), Disp: 100 mL, Rfl: 12    metoclopramide HCl (REGLAN) 10 MG tablet, Take 1 tablet (10 mg total) by mouth 3 (three) times daily before meals. (Patient not taking: Reported on 12/3/2020), Disp: 90 tablet, Rfl: 11    oxyCODONE (ROXICODONE) 10 mg Tab immediate release tablet, Take 1 tablet (10 mg total) by mouth every 8 (eight) hours as needed. (Patient not taking: Reported on 12/3/2020), Disp: 21 tablet, Rfl: 0    pantoprazole (PROTONIX) 40 MG tablet, Take 1 tablet (40 mg total) by mouth once daily. (Patient not taking: Reported on 12/3/2020), Disp: 30 tablet, Rfl: 11    potassium chloride (KLOR-CON) 10 MEQ TbSR, , Disp: , Rfl:      potassium chloride SA (K-DUR,KLOR-CON) 20 MEQ tablet, Take 1 tablet (20 mEq total) by mouth once daily. Take 2 tabs once by mouth on the first day, followed by one tab by mouth daily. (Patient not taking: Reported on 12/3/2020), Disp: 14 tablet, Rfl: 0    psyllium husk, aspartame, (METAMUCIL) 3.4 gram PwPk packet, Take 1 packet by mouth once daily. (Patient not taking: Reported on 12/3/2020), Disp:  , Rfl:     scopolamine (TRANSDERM-SCOP) 1.3-1.5 mg (1 mg over 3 days), Place 1 patch onto the skin Every 3 (three) days. (Patient not taking: Reported on 12/3/2020), Disp: 30 patch, Rfl: 3    Social hx:    Social History     Socioeconomic History    Marital status: Single     Spouse name: Not on file    Number of children: Not on file    Years of education: Not on file    Highest education level: Not on file   Occupational History    Not on file   Social Needs    Financial resource strain: Not on file    Food insecurity     Worry: Not on file     Inability: Not on file    Transportation needs     Medical: Not on file     Non-medical: Not on file   Tobacco Use    Smoking status: Former Smoker     Types: Cigarettes     Quit date: 2000     Years since quittin.9    Smokeless tobacco: Never Used   Substance and Sexual Activity    Alcohol use: No    Drug use: No    Sexual activity: Not Currently   Lifestyle    Physical activity     Days per week: Not on file     Minutes per session: Not on file    Stress: Not on file   Relationships    Social connections     Talks on phone: Not on file     Gets together: Not on file     Attends Gnosticist service: Not on file     Active member of club or organization: Not on file     Attends meetings of clubs or organizations: Not on file     Relationship status: Not on file   Other Topics Concern    Not on file   Social History Narrative    Not on file       Family hx:    Family History   Problem Relation Age of Onset    Stroke Mother     Heart disease Mother   "   Hyperlipidemia Mother     Hypertension Mother     Alcohol abuse Father     Stroke Sister     Diabetes Sister     Heart disease Sister     Hyperlipidemia Sister     Hypertension Sister     Diabetes Brother     Early death Grandchild        PE:   Vitals: /70   Ht 5' 2" (1.575 m)   Wt 62.6 kg (138 lb)   LMP  (LMP Unknown)   BMI 25.24 kg/m²   APPEARANCE: Well nourished, well developed, in no acute distress.  SKIN: Normal skin turgor, no lesions.  EXTREMITIES: No clubbing cyanosis or edema.        Imaging:Uterus:     Measures 6.5 x 3.2 x 4.5 cm in dimensions.  No discrete uterine fibroids identified.  The endometrium is slightly heterogeneous and measures up to 4 mm in maximum thickness, in this reportedly postmenopausal female.  There are a few scattered small anechoic areas noted along the endometrium with somewhat poor visibility of the endometrial stripe.  Small nabothian cyst noted.     Ovaries:     Left ovary is not identified.  The right ovary measures 1.7 x 1.8 x 1.5 cm.  No left adnexal mass seen.  Doppler flow demonstrated to the right ovary.     Free Fluid:     None.    Pathology: SMALL FRAGMENTS OF BENIGN ENDOMETRIAL TISSUE WITH SOME FRAGMENTS SHOWING   COMPLEX HYPERPLASIA WITHOUT ATYPIA     A/P: Azucena Morrison is a 70 y.o. female who presents for preop evaluation.      1) Surgery:   Discussed with her general surgeon and are considering repair of colostomy in January, will do this surgery first to make sure doesn't need hysterectomy.   -Risks and benefits of surgery discussed with the patient.  All questions were answered.  Consents for surgery and blood were signed by the patient today. Understands risk of uterine perforation and need for more surgery. Understands risk of scaring pain or recurrence of the cysts.   -Patient has been instructed to be NPO on night prior to procedure  -Preop labs ordered: BMP, CBC, T&S and UPT     -postop visit scheduled 2 weeks     Patient to proceed " now immediately to preop

## 2020-12-03 NOTE — H&P (VIEW-ONLY)
Diagnosis: PMB, endometrial hyperplasia without atypia  Planned Procedure: [facility] [place] D&C with vulvar biopsy and excision of sebaceous cyst   Date of Planned Procedure: 20    Cc: I am here for preop for my surgery    HPI: Azucena Morrison is a 70 y.o. female with history of PMB with EMB with scant tissue but some endometrial hyperplasia. She also has 2 sebaceous cysts on her vulva that she would like removed.      ROS:  GENERAL: Denies weight gain or weight loss. Feeling well overall.   SKIN: Denies rash or lesions.   HEAD: Denies head injury or headache.   CHEST: Denies chest pain or shortness of breath.   CARDIOVASCULAR: Denies palpitations or left sided chest pain.   ABDOMEN: No abdominal pain, constipation, diarrhea, nausea, vomiting or rectal bleeding.   URINARY: No frequency, dysuria, hematuria, or burning on urination.  REPRODUCTIVE: See HPI.   HEMATOLOGIC: No easy bruisability or excessive bleeding.   MUSCULOSKELETAL: Denies joint pain or swelling.   NEUROLOGIC: Denies syncope or weakness.   PSYCHIATRIC: Denies depression, anxiety or mood swings.     PMHx:   Past Medical History:   Diagnosis Date    Chronic kidney disease, stage 3     Diabetes mellitus     Diabetes mellitus, type 2     Hypertension     Malnutrition of moderate degree 2020    UTI (urinary tract infection) 2020       Surgical hx:   Past Surgical History:   Procedure Laterality Date    CATHETERIZATION OF URETER Bilateral 2020    Procedure: CATHETERIZATION, URETER;  Surgeon: Kvng Cunningham MD;  Location: [address];  Service: Urology;  Laterality: Bilateral;     SECTION, CLASSIC      x2    CHOLECYSTECTOMY      COLON SURGERY      [facility]    COLONOSCOPY N/A 2018    Procedure: COLONOSCOPY;  Surgeon: Gibran Aguayo MD;  Location: [facility];  Service: Endoscopy;  Laterality: N/A;    COLOSTOMY Left     CYSTOSCOPY N/A 2020    Procedure: CYSTOSCOPY;  Surgeon: Kvng Cunningham MD;   Location: [facility] OR 2ND FLR;  Service: Urology;  Laterality: N/A;    CYSTOSCOPY WITH URETEROSCOPY, RETROGRADE PYELOGRAPHY, AND INSERTION OF STENT Left 2019    Procedure: CYSTOSCOPY, WITH RETROGRADE PYELOGRAM AND URETERAL STENT INSERTION with placement of a muir cath;  Surgeon: Ulisses Horton MD;  Location: [facility] OR;  Service: General;  Laterality: Left;    ILEOSTOMY  2020    Procedure: CREATION, ILEOSTOMY;  Surgeon: Fabiana Valiente MD;  Location: [facility] OR 2ND FLR;  Service: Colon and Rectal;;    INCISION AND DRAINAGE OF ABSCESS N/A 2019    Procedure: INCISION AND DRAINAGE, ABSCESS;  Surgeon: Ulisses Horton MD;  Location: [facility] OR;  Service: General;  Laterality: N/A;    INCISION OF ABDOMINAL WALL N/A 8/10/2018    Procedure: INCISION, ABDOMINAL WALL;  Surgeon: Ulisses Horton MD;  Location: [facility] OR;  Service: General;  Laterality: N/A;    INCISIONAL HERNIA REPAIR Right     LOW ANTERIOR RESECTION OF COLON N/A 2020    Procedure: RESECTION, COLON, LOW ANTERIOR;  Surgeon: Fabiana Valiente MD;  Location: [facility] OR 2ND FLR;  Service: Colon and Rectal;  Laterality: N/A;    LYSIS OF ADHESIONS  2020    Procedure: LYSIS, ADHESIONS;  Surgeon: Fabiana Valiente MD;  Location: [facility] OR 2ND FLR;  Service: Colon and Rectal;;       GYNhx: No LMP recorded (lmp unknown). Patient is postmenopausal.    Obhx:     Review of patient's allergies indicates:   Allergen Reactions    Chlorhexidine gluconate Rash     Chlorhexidine/alcohol wipes. Rash and blistering.       MEDS: Reviewed, reconciled      Current Outpatient Medications:     acetaminophen (TYLENOL) 325 MG tablet, Take 2 tablets (650 mg total) by mouth every 8 (eight) hours as needed., Disp: , Rfl: 0    ascorbic acid, vitamin C, (VITAMIN C) 500 MG tablet, Take 1 tablet (500 mg total) by mouth once daily., Disp:  , Rfl:     carvediloL (COREG) 12.5 MG tablet, Take 1 tablet (12.5 mg total) by mouth 2 (two) times daily.,  Disp: 60 tablet, Rfl: 11    carvediloL (COREG) 25 MG tablet, , Disp: , Rfl:     clotrimazole-betamethasone 1-0.05% (LOTRISONE) cream, Apply topically 2 (two) times daily., Disp: 45 g, Rfl: 1    cyproheptadine (PERIACTIN) 4 mg tablet, , Disp: , Rfl:     gabapentin (NEURONTIN) 300 MG capsule, Take 1 capsule (300 mg total) by mouth 3 (three) times daily., Disp: 90 capsule, Rfl: 11    triamcinolone acetonide 0.1% (KENALOG) 0.1 % cream, Apply topically 2 (two) times daily., Disp: 30 g, Rfl: 0    allopurinoL (ZYLOPRIM) 100 MG tablet, Take 1 tablet (100 mg total) by mouth once daily., Disp: 30 tablet, Rfl: 3    hydroCHLOROthiazide (HYDRODIURIL) 25 MG tablet, Take 1 tablet (25 mg total) by mouth once daily. (Patient not taking: Reported on 12/3/2020), Disp: 30 tablet, Rfl: 3    magnesium oxide (MAG-OX) 400 mg (241.3 mg magnesium) tablet, Take 1 tablet (400 mg total) by mouth 2 (two) times daily. (Patient not taking: Reported on 12/3/2020), Disp: , Rfl: 0    megestroL (MEGACE) 40 MG Tab, Take 1 tablet (40 mg total) by mouth 3 (three) times daily before meals. (Patient not taking: Reported on 12/3/2020.), Disp: 90 tablet, Rfl: 11    MEROPENEM 1 G/100 ML NS, READY TO MIX SYSTEM,, Inject 100 mLs (1 g total) into the vein every 12 (twelve) hours. (Patient not taking: Reported on 12/3/2020), Disp: 100 mL, Rfl: 12    metoclopramide HCl (REGLAN) 10 MG tablet, Take 1 tablet (10 mg total) by mouth 3 (three) times daily before meals. (Patient not taking: Reported on 12/3/2020), Disp: 90 tablet, Rfl: 11    oxyCODONE (ROXICODONE) 10 mg Tab immediate release tablet, Take 1 tablet (10 mg total) by mouth every 8 (eight) hours as needed. (Patient not taking: Reported on 12/3/2020), Disp: 21 tablet, Rfl: 0    pantoprazole (PROTONIX) 40 MG tablet, Take 1 tablet (40 mg total) by mouth once daily. (Patient not taking: Reported on 12/3/2020), Disp: 30 tablet, Rfl: 11    potassium chloride (KLOR-CON) 10 MEQ TbSR, , Disp: , Rfl:      potassium chloride SA (K-DUR,KLOR-CON) 20 MEQ tablet, Take 1 tablet (20 mEq total) by mouth once daily. Take 2 tabs once by mouth on the first day, followed by one tab by mouth daily. (Patient not taking: Reported on 12/3/2020), Disp: 14 tablet, Rfl: 0    psyllium husk, aspartame, (METAMUCIL) 3.4 gram PwPk packet, Take 1 packet by mouth once daily. (Patient not taking: Reported on 12/3/2020), Disp:  , Rfl:     scopolamine (TRANSDERM-SCOP) 1.3-1.5 mg (1 mg over 3 days), Place 1 patch onto the skin Every 3 (three) days. (Patient not taking: Reported on 12/3/2020), Disp: 30 patch, Rfl: 3    Social hx:    Social History     Socioeconomic History    Marital status: Single     Spouse name: Not on file    Number of children: Not on file    Years of education: Not on file    Highest education level: Not on file   Occupational History    Not on file   Social Needs    Financial resource strain: Not on file    Food insecurity     Worry: Not on file     Inability: Not on file    Transportation needs     Medical: Not on file     Non-medical: Not on file   Tobacco Use    Smoking status: Former Smoker     Types: Cigarettes     Quit date: 2000     Years since quittin.9    Smokeless tobacco: Never Used   Substance and Sexual Activity    Alcohol use: No    Drug use: No    Sexual activity: Not Currently   Lifestyle    Physical activity     Days per week: Not on file     Minutes per session: Not on file    Stress: Not on file   Relationships    Social connections     Talks on phone: Not on file     Gets together: Not on file     Attends Judaism service: Not on file     Active member of club or organization: Not on file     Attends meetings of clubs or organizations: Not on file     Relationship status: Not on file   Other Topics Concern    Not on file   Social History Narrative    Not on file       Family hx:    Family History   Problem Relation Age of Onset    Stroke Mother     Heart disease Mother   "   Hyperlipidemia Mother     Hypertension Mother     Alcohol abuse Father     Stroke Sister     Diabetes Sister     Heart disease Sister     Hyperlipidemia Sister     Hypertension Sister     Diabetes Brother     Early death Grandchild        PE:   Vitals: /70   Ht 5' 2" (1.575 m)   Wt 62.6 kg (138 lb)   LMP  (LMP Unknown)   BMI 25.24 kg/m²   APPEARANCE: Well nourished, well developed, in no acute distress.  SKIN: Normal skin turgor, no lesions.  EXTREMITIES: No clubbing cyanosis or edema.        Imaging:Uterus:     Measures 6.5 x 3.2 x 4.5 cm in dimensions.  No discrete uterine fibroids identified.  The endometrium is slightly heterogeneous and measures up to 4 mm in maximum thickness, in this reportedly postmenopausal female.  There are a few scattered small anechoic areas noted along the endometrium with somewhat poor visibility of the endometrial stripe.  Small nabothian cyst noted.     Ovaries:     Left ovary is not identified.  The right ovary measures 1.7 x 1.8 x 1.5 cm.  No left adnexal mass seen.  Doppler flow demonstrated to the right ovary.     Free Fluid:     None.    Pathology: SMALL FRAGMENTS OF BENIGN ENDOMETRIAL TISSUE WITH SOME FRAGMENTS SHOWING   COMPLEX HYPERPLASIA WITHOUT ATYPIA     A/P: Azucena Morrison is a 70 y.o. female who presents for preop evaluation.      1) Surgery:   Discussed with her general surgeon and are considering repair of colostomy in January, will do this surgery first to make sure doesn't need hysterectomy.   -Risks and benefits of surgery discussed with the patient.  All questions were answered.  Consents for surgery and blood were signed by the patient today. Understands risk of uterine perforation and need for more surgery. Understands risk of scaring pain or recurrence of the cysts.   -Patient has been instructed to be NPO on night prior to procedure  -Preop labs ordered: BMP, CBC, T&S and UPT     -postop visit scheduled 2 weeks     Patient to proceed " now immediately to preop

## 2020-12-06 ENCOUNTER — LAB VISIT (OUTPATIENT)
Dept: URGENT CARE | Facility: CLINIC | Age: 70
End: 2020-12-06
Payer: MEDICARE

## 2020-12-06 DIAGNOSIS — Z00.00 ROUTINE GENERAL MEDICAL EXAMINATION AT A HEALTH CARE FACILITY: ICD-10-CM

## 2020-12-06 PROCEDURE — U0003 INFECTIOUS AGENT DETECTION BY NUCLEIC ACID (DNA OR RNA); SEVERE ACUTE RESPIRATORY SYNDROME CORONAVIRUS 2 (SARS-COV-2) (CORONAVIRUS DISEASE [COVID-19]), AMPLIFIED PROBE TECHNIQUE, MAKING USE OF HIGH THROUGHPUT TECHNOLOGIES AS DESCRIBED BY CMS-2020-01-R: HCPCS

## 2020-12-07 LAB — SARS-COV-2 RNA RESP QL NAA+PROBE: NOT DETECTED

## 2020-12-08 ENCOUNTER — LAB VISIT (OUTPATIENT)
Dept: LAB | Facility: HOSPITAL | Age: 70
End: 2020-12-08
Attending: OBSTETRICS & GYNECOLOGY
Payer: MEDICARE

## 2020-12-08 DIAGNOSIS — Z01.818 PREOPERATIVE EXAM FOR GYNECOLOGIC SURGERY: ICD-10-CM

## 2020-12-08 LAB
ABO + RH BLD: NORMAL
ANION GAP SERPL CALC-SCNC: 9 MMOL/L (ref 8–16)
BASOPHILS # BLD AUTO: 0.03 K/UL (ref 0–0.2)
BASOPHILS NFR BLD: 0.4 % (ref 0–1.9)
BLD GP AB SCN CELLS X3 SERPL QL: NORMAL
BUN SERPL-MCNC: 21 MG/DL (ref 8–23)
CALCIUM SERPL-MCNC: 10 MG/DL (ref 8.7–10.5)
CHLORIDE SERPL-SCNC: 110 MMOL/L (ref 95–110)
CO2 SERPL-SCNC: 24 MMOL/L (ref 23–29)
CREAT SERPL-MCNC: 1.1 MG/DL (ref 0.5–1.4)
DIFFERENTIAL METHOD: ABNORMAL
EOSINOPHIL # BLD AUTO: 0.1 K/UL (ref 0–0.5)
EOSINOPHIL NFR BLD: 1.5 % (ref 0–8)
ERYTHROCYTE [DISTWIDTH] IN BLOOD BY AUTOMATED COUNT: 15.3 % (ref 11.5–14.5)
EST. GFR  (AFRICAN AMERICAN): 59 ML/MIN/1.73 M^2
EST. GFR  (NON AFRICAN AMERICAN): 51 ML/MIN/1.73 M^2
GLUCOSE SERPL-MCNC: 144 MG/DL (ref 70–110)
HCT VFR BLD AUTO: 35 % (ref 37–48.5)
HGB BLD-MCNC: 12.1 G/DL (ref 12–16)
IMM GRANULOCYTES # BLD AUTO: 0.02 K/UL (ref 0–0.04)
IMM GRANULOCYTES NFR BLD AUTO: 0.3 % (ref 0–0.5)
LYMPHOCYTES # BLD AUTO: 1.3 K/UL (ref 1–4.8)
LYMPHOCYTES NFR BLD: 19.2 % (ref 18–48)
MCH RBC QN AUTO: 29.1 PG (ref 27–31)
MCHC RBC AUTO-ENTMCNC: 34.6 G/DL (ref 32–36)
MCV RBC AUTO: 84 FL (ref 82–98)
MONOCYTES # BLD AUTO: 0.3 K/UL (ref 0.3–1)
MONOCYTES NFR BLD: 4.7 % (ref 4–15)
NEUTROPHILS # BLD AUTO: 5 K/UL (ref 1.8–7.7)
NEUTROPHILS NFR BLD: 73.9 % (ref 38–73)
NRBC BLD-RTO: 0 /100 WBC
PLATELET # BLD AUTO: 324 K/UL (ref 150–350)
PMV BLD AUTO: 8.5 FL (ref 9.2–12.9)
POTASSIUM SERPL-SCNC: 4.1 MMOL/L (ref 3.5–5.1)
RBC # BLD AUTO: 4.16 M/UL (ref 4–5.4)
SODIUM SERPL-SCNC: 143 MMOL/L (ref 136–145)
WBC # BLD AUTO: 6.76 K/UL (ref 3.9–12.7)

## 2020-12-08 PROCEDURE — 86901 BLOOD TYPING SEROLOGIC RH(D): CPT

## 2020-12-08 PROCEDURE — 36415 COLL VENOUS BLD VENIPUNCTURE: CPT

## 2020-12-08 PROCEDURE — 80048 BASIC METABOLIC PNL TOTAL CA: CPT

## 2020-12-08 PROCEDURE — 85025 COMPLETE CBC W/AUTO DIFF WBC: CPT

## 2020-12-09 ENCOUNTER — HOSPITAL ENCOUNTER (OUTPATIENT)
Facility: HOSPITAL | Age: 70
Discharge: HOME | End: 2020-12-09
Attending: OBSTETRICS & GYNECOLOGY | Admitting: OBSTETRICS & GYNECOLOGY
Payer: MEDICARE

## 2020-12-09 ENCOUNTER — ANESTHESIA (OUTPATIENT)
Dept: SURGERY | Facility: HOSPITAL | Age: 70
End: 2020-12-09
Payer: MEDICARE

## 2020-12-09 ENCOUNTER — ANESTHESIA EVENT (OUTPATIENT)
Dept: SURGERY | Facility: HOSPITAL | Age: 70
End: 2020-12-09
Payer: MEDICARE

## 2020-12-09 VITALS
SYSTOLIC BLOOD PRESSURE: 119 MMHG | BODY MASS INDEX: 25.4 KG/M2 | DIASTOLIC BLOOD PRESSURE: 72 MMHG | WEIGHT: 138 LBS | OXYGEN SATURATION: 99 % | RESPIRATION RATE: 20 BRPM | HEART RATE: 69 BPM | HEIGHT: 62 IN | TEMPERATURE: 98 F

## 2020-12-09 DIAGNOSIS — Z01.818 PREOPERATIVE EXAM FOR GYNECOLOGIC SURGERY: ICD-10-CM

## 2020-12-09 PROBLEM — Z98.890 STATUS POST HYSTEROSCOPY: Status: ACTIVE | Noted: 2020-12-09

## 2020-12-09 LAB — POCT GLUCOSE: 105 MG/DL (ref 70–110)

## 2020-12-09 PROCEDURE — 27201423 OPTIME MED/SURG SUP & DEVICES STERILE SUPPLY: Performed by: OBSTETRICS & GYNECOLOGY

## 2020-12-09 PROCEDURE — 88305 TISSUE EXAM BY PATHOLOGIST: CPT | Performed by: PATHOLOGY

## 2020-12-09 PROCEDURE — 25000003 PHARM REV CODE 250: Performed by: STUDENT IN AN ORGANIZED HEALTH CARE EDUCATION/TRAINING PROGRAM

## 2020-12-09 PROCEDURE — 25000003 PHARM REV CODE 250: Performed by: OBSTETRICS & GYNECOLOGY

## 2020-12-09 PROCEDURE — 56605 BIOPSY OF VULVA/PERINEUM: CPT | Mod: RT,,, | Performed by: OBSTETRICS & GYNECOLOGY

## 2020-12-09 PROCEDURE — 88304 PR  SURG PATH,LEVEL III: ICD-10-PCS | Mod: 26,,, | Performed by: PATHOLOGY

## 2020-12-09 PROCEDURE — 88304 TISSUE EXAM BY PATHOLOGIST: CPT | Performed by: PATHOLOGY

## 2020-12-09 PROCEDURE — 37000009 HC ANESTHESIA EA ADD 15 MINS: Performed by: OBSTETRICS & GYNECOLOGY

## 2020-12-09 PROCEDURE — 11420 EXC H-F-NK-SP B9+MARG 0.5/<: CPT | Mod: 59,,, | Performed by: OBSTETRICS & GYNECOLOGY

## 2020-12-09 PROCEDURE — 36000707: Performed by: OBSTETRICS & GYNECOLOGY

## 2020-12-09 PROCEDURE — 63600175 PHARM REV CODE 636 W HCPCS: Performed by: STUDENT IN AN ORGANIZED HEALTH CARE EDUCATION/TRAINING PROGRAM

## 2020-12-09 PROCEDURE — 56605 PR BIOPSY VULVA/PERINEUM,ONE LESN: ICD-10-PCS | Mod: RT,,, | Performed by: OBSTETRICS & GYNECOLOGY

## 2020-12-09 PROCEDURE — 58558 HYSTEROSCOPY BIOPSY: CPT | Mod: ,,, | Performed by: OBSTETRICS & GYNECOLOGY

## 2020-12-09 PROCEDURE — 36000706: Performed by: OBSTETRICS & GYNECOLOGY

## 2020-12-09 PROCEDURE — 88305 TISSUE EXAM BY PATHOLOGIST: ICD-10-PCS | Mod: 26,,, | Performed by: PATHOLOGY

## 2020-12-09 PROCEDURE — 58558 PR HYSTEROSCOPY,W/ENDO BX: ICD-10-PCS | Mod: ,,, | Performed by: OBSTETRICS & GYNECOLOGY

## 2020-12-09 PROCEDURE — 88304 TISSUE EXAM BY PATHOLOGIST: CPT | Mod: 26,,, | Performed by: PATHOLOGY

## 2020-12-09 PROCEDURE — C1782 MORCELLATOR: HCPCS | Performed by: OBSTETRICS & GYNECOLOGY

## 2020-12-09 PROCEDURE — 11420 PR EXC SKIN BENIG <0.5 CM REMAINDER BODY: ICD-10-PCS | Mod: 59,,, | Performed by: OBSTETRICS & GYNECOLOGY

## 2020-12-09 PROCEDURE — 71000015 HC POSTOP RECOV 1ST HR: Performed by: OBSTETRICS & GYNECOLOGY

## 2020-12-09 PROCEDURE — 88305 TISSUE EXAM BY PATHOLOGIST: CPT | Mod: 26,,, | Performed by: PATHOLOGY

## 2020-12-09 PROCEDURE — 37000008 HC ANESTHESIA 1ST 15 MINUTES: Performed by: OBSTETRICS & GYNECOLOGY

## 2020-12-09 RX ORDER — DIPHENHYDRAMINE HCL 25 MG
25 CAPSULE ORAL EVERY 4 HOURS PRN
Status: CANCELLED | OUTPATIENT
Start: 2020-12-09

## 2020-12-09 RX ORDER — ACETAMINOPHEN 10 MG/ML
INJECTION, SOLUTION INTRAVENOUS
Status: DISCONTINUED | OUTPATIENT
Start: 2020-12-09 | End: 2020-12-09

## 2020-12-09 RX ORDER — LIDOCAINE HCL/PF 100 MG/5ML
SYRINGE (ML) INTRAVENOUS
Status: DISCONTINUED | OUTPATIENT
Start: 2020-12-09 | End: 2020-12-09

## 2020-12-09 RX ORDER — HYDROCODONE BITARTRATE AND ACETAMINOPHEN 10; 325 MG/1; MG/1
1 TABLET ORAL EVERY 4 HOURS PRN
Status: CANCELLED | OUTPATIENT
Start: 2020-12-09

## 2020-12-09 RX ORDER — OXYCODONE AND ACETAMINOPHEN 5; 325 MG/1; MG/1
1 TABLET ORAL EVERY 4 HOURS PRN
Qty: 5 TABLET | Refills: 0 | Status: ON HOLD | OUTPATIENT
Start: 2020-12-09 | End: 2021-02-09

## 2020-12-09 RX ORDER — LIDOCAINE HYDROCHLORIDE 10 MG/ML
INJECTION INFILTRATION; PERINEURAL
Status: DISCONTINUED | OUTPATIENT
Start: 2020-12-09 | End: 2020-12-09 | Stop reason: HOSPADM

## 2020-12-09 RX ORDER — FENTANYL CITRATE 50 UG/ML
INJECTION, SOLUTION INTRAMUSCULAR; INTRAVENOUS
Status: DISCONTINUED | OUTPATIENT
Start: 2020-12-09 | End: 2020-12-09

## 2020-12-09 RX ORDER — ONDANSETRON 8 MG/1
8 TABLET, ORALLY DISINTEGRATING ORAL EVERY 8 HOURS PRN
Status: CANCELLED | OUTPATIENT
Start: 2020-12-09

## 2020-12-09 RX ORDER — SODIUM CHLORIDE, SODIUM LACTATE, POTASSIUM CHLORIDE, CALCIUM CHLORIDE 600; 310; 30; 20 MG/100ML; MG/100ML; MG/100ML; MG/100ML
INJECTION, SOLUTION INTRAVENOUS CONTINUOUS PRN
Status: DISCONTINUED | OUTPATIENT
Start: 2020-12-09 | End: 2020-12-09

## 2020-12-09 RX ORDER — PROPOFOL 10 MG/ML
VIAL (ML) INTRAVENOUS CONTINUOUS PRN
Status: DISCONTINUED | OUTPATIENT
Start: 2020-12-09 | End: 2020-12-09

## 2020-12-09 RX ORDER — SODIUM CHLORIDE 0.9 % (FLUSH) 0.9 %
10 SYRINGE (ML) INJECTION
Status: DISCONTINUED | OUTPATIENT
Start: 2020-12-09 | End: 2020-12-09 | Stop reason: HOSPADM

## 2020-12-09 RX ORDER — HYDROCODONE BITARTRATE AND ACETAMINOPHEN 5; 325 MG/1; MG/1
1 TABLET ORAL EVERY 4 HOURS PRN
Status: CANCELLED | OUTPATIENT
Start: 2020-12-09

## 2020-12-09 RX ORDER — DEXAMETHASONE SODIUM PHOSPHATE 4 MG/ML
INJECTION, SOLUTION INTRA-ARTICULAR; INTRALESIONAL; INTRAMUSCULAR; INTRAVENOUS; SOFT TISSUE
Status: DISCONTINUED | OUTPATIENT
Start: 2020-12-09 | End: 2020-12-09

## 2020-12-09 RX ORDER — LIDOCAINE HYDROCHLORIDE 10 MG/ML
1 INJECTION, SOLUTION EPIDURAL; INFILTRATION; INTRACAUDAL; PERINEURAL ONCE
Status: CANCELLED | OUTPATIENT
Start: 2020-12-09 | End: 2020-12-09

## 2020-12-09 RX ORDER — ACETAMINOPHEN 325 MG/1
650 TABLET ORAL EVERY 4 HOURS PRN
Status: CANCELLED | OUTPATIENT
Start: 2020-12-09

## 2020-12-09 RX ORDER — MUPIROCIN 20 MG/G
OINTMENT TOPICAL
Status: DISCONTINUED | OUTPATIENT
Start: 2020-12-09 | End: 2020-12-09 | Stop reason: HOSPADM

## 2020-12-09 RX ORDER — PROPOFOL 10 MG/ML
VIAL (ML) INTRAVENOUS
Status: DISCONTINUED | OUTPATIENT
Start: 2020-12-09 | End: 2020-12-09

## 2020-12-09 RX ORDER — DIPHENHYDRAMINE HYDROCHLORIDE 50 MG/ML
25 INJECTION INTRAMUSCULAR; INTRAVENOUS EVERY 4 HOURS PRN
Status: CANCELLED | OUTPATIENT
Start: 2020-12-09

## 2020-12-09 RX ORDER — SODIUM CHLORIDE 9 MG/ML
INJECTION, SOLUTION INTRAVENOUS CONTINUOUS
Status: DISCONTINUED | OUTPATIENT
Start: 2020-12-09 | End: 2020-12-09 | Stop reason: HOSPADM

## 2020-12-09 RX ORDER — ONDANSETRON 2 MG/ML
INJECTION INTRAMUSCULAR; INTRAVENOUS
Status: DISCONTINUED | OUTPATIENT
Start: 2020-12-09 | End: 2020-12-09

## 2020-12-09 RX ADMIN — PROPOFOL 30 MG: 10 INJECTION, EMULSION INTRAVENOUS at 11:12

## 2020-12-09 RX ADMIN — DEXAMETHASONE SODIUM PHOSPHATE 4 MG: 4 INJECTION, SOLUTION INTRA-ARTICULAR; INTRALESIONAL; INTRAMUSCULAR; INTRAVENOUS; SOFT TISSUE at 11:12

## 2020-12-09 RX ADMIN — FENTANYL CITRATE 50 MCG: 50 INJECTION, SOLUTION INTRAMUSCULAR; INTRAVENOUS at 11:12

## 2020-12-09 RX ADMIN — ACETAMINOPHEN 1000 MG: 10 INJECTION, SOLUTION INTRAVENOUS at 11:12

## 2020-12-09 RX ADMIN — SODIUM CHLORIDE, SODIUM LACTATE, POTASSIUM CHLORIDE, AND CALCIUM CHLORIDE: .6; .31; .03; .02 INJECTION, SOLUTION INTRAVENOUS at 11:12

## 2020-12-09 RX ADMIN — ONDANSETRON 4 MG: 2 INJECTION, SOLUTION INTRAMUSCULAR; INTRAVENOUS at 11:12

## 2020-12-09 RX ADMIN — PROPOFOL 20 MG: 10 INJECTION, EMULSION INTRAVENOUS at 11:12

## 2020-12-09 RX ADMIN — SODIUM CHLORIDE, SODIUM LACTATE, POTASSIUM CHLORIDE, AND CALCIUM CHLORIDE: .6; .31; .03; .02 INJECTION, SOLUTION INTRAVENOUS at 12:12

## 2020-12-09 RX ADMIN — PROPOFOL 60 MCG/KG/MIN: 10 INJECTION, EMULSION INTRAVENOUS at 11:12

## 2020-12-09 RX ADMIN — LIDOCAINE HYDROCHLORIDE 100 MG: 20 INJECTION, SOLUTION INTRAVENOUS at 11:12

## 2020-12-09 NOTE — DISCHARGE SUMMARY
Admitting Diagnosis: PMB, endometrial hyperplasia, vulvar itching, vulvar sebaceous cyst   Discharge Diagnosis: s/p [facility] D&C, right vulvar biopsy and right vulvar sebaceous cyst   Procedure: [facility] D&C, R vulvar biopsy and sebaceous cyst removal   Service: OB-GYN, Caitlyn Feng   Consults: none   Disposition: home  Condition as Discharge: Stable   Hospital Course: Patient was transferred to outpatient surgery after her procedure.  Her recovery was uncomplicated and by post-op day 0 she was tolerating PO without N/V, ambulating without difficulty, her pain was well controlled with PO pain medications and she had passed flatus. She was stable and ready for discharge.   Medications: OTC ibuprofen, Percocet  Follow up: in 2 week in clinic   Instructions: Keep incision clean and dry, continue to use Is, continue ambulation, take medications as needed and take stool softener as needed, pelvic rest until instructed otherwise by physician  Call or return to ED for fever >100.4, foul smelling vaginal discharge, heavy vaginal bleeding, abdominal pain not responsive to medications or other concerns.

## 2020-12-09 NOTE — DISCHARGE INSTRUCTIONS
Discharge Instructions for Dilation and Curettage (D and C)  Your doctor performed dilation and curettage (D&C). The reasons for having this procedure vary from person to person. The D&C may be done to control heavy uterine bleeding, to find the cause of irregular bleeding, to perform an , or to remove pregnancy tissue if you have had a miscarriage.  Home care  · Take it easy. Rest for 2 days as needed.  · Return to your normal activities after 24 to 48 hours. You may also return to work at that time.  · Eat a normal diet.  · Take an over-the-counter pain reliever for pain, if needed.  · Remember, its OK to have bleeding for about a week after the procedure. The amount of bleeding should be similar to what you have during a normal period.  · Dont drive for 24 hours after the procedure unless specifically told by your provider that it is OK to do so.  · Dont have sexual intercourse or use tampons or douches until your doctor says its safe to do so.  Follow-up  · Make a follow-up appointment as directed by our staff.     When to call your doctor  Call your doctor right away if you have any of the following:  · Bleeding that soaks more than one sanitary pad in one hour  · Severe abdominal pain  · Severe cramps  · Fever above 100.4°F (38.0°C)  · A foul smelling vaginal discharge   Date Last Reviewed: 2015  © 5651-2657 [employer]. [address] [place] [address]. All rights reserved. This information is not intended as a substitute for professional medical care. Always follow your healthcare professional's instructions.        Acetaminophen; Oxycodone tablets  What is this medicine?  ACETAMINOPHEN; OXYCODONE (a set a DUSTIN judi fen; ox i KOE done) is a pain reliever. It is used to treat moderate to severe pain.  How should I use this medicine?  Take this medicine by mouth with a full glass of water. Follow the directions on the prescription label. You can take it with or without  food. If it upsets your stomach, take it with food. Take your medicine at regular intervals. Do not take it more often than directed.  A special MedGuide will be given to you by the pharmacist with each prescription and refill. Be sure to read this information carefully each time.  Talk to your pediatrician regarding the use of this medicine in children. Special care may be needed.  What side effects may I notice from receiving this medicine?  Side effects that you should report to your doctor or health care professional as soon as possible:  · allergic reactions like skin rash, itching or hives, swelling of the face, lips, or tongue  · breathing problems  · confusion  · redness, blistering, peeling or loosening of the skin, including inside the mouth  · signs and symptoms of liver injury like dark yellow or brown urine; general ill feeling or flu-like symptoms; light-colored stools; loss of appetite; nausea; right upper belly pain; unusually weak or tired; yellowing of the eyes or skin  · signs and symptoms of low blood pressure like dizziness; feeling faint or lightheaded, falls; unusually weak or tired  · trouble passing urine or change in the amount of urine  Side effects that usually do not require medical attention (report to your doctor or health care professional if they continue or are bothersome):  · constipation  · dry mouth  · nausea, vomiting  · tiredness  What may interact with this medicine?  This medicine may interact with the following medications:  · alcohol  · antihistamines for allergy, cough and cold  · antiviral medicines for HIV or AIDS  · atropine  · certain antibiotics like clarithromycin, erythromycin, linezolid, rifampin  · certain medicines for anxiety or sleep  · certain medicines for bladder problems like oxybutynin, tolterodine  · certain medicines for depression like amitriptyline, fluoxetine, sertraline  · certain medicines for fungal infections like ketoconazole, itraconazole,  voriconazole  · certain medicines for migraine headache like almotriptan, eletriptan, frovatriptan, naratriptan, rizatriptan, sumatriptan, zolmitriptan  · certain medicines for nausea or vomiting like dolasetron, ondansetron, palonosetron  · certain medicines for Parkinson's disease like benztropine, trihexyphenidyl  · certain medicines for seizures like phenobarbital, phenytoin, primidone  · certain medicines for stomach problems like dicyclomine, hyoscyamine  · certain medicines for travel sickness like scopolamine  · diuretics  · general anesthetics like halothane, isoflurane, methoxyflurane, propofol  · ipratropium  · local anesthetics like lidocaine, pramoxine, tetracaine  · MAOIs like Carbex, Eldepryl, Marplan, Nardil, and Parnate  · medicines that relax muscles for surgery  · methylene blue  · nilotinib  · other medicines with acetaminophen  · other narcotic medicines for pain or cough  · phenothiazines like chlorpromazine, mesoridazine, prochlorperazine, thioridazine  What if I miss a dose?  If you miss a dose, take it as soon as you can. If it is almost time for your next dose, take only that dose. Do not take double or extra doses.  Where should I keep my medicine?  Keep out of the reach of children. This medicine can be abused. Keep your medicine in a safe place to protect it from theft. Do not share this medicine with anyone. Selling or giving away this medicine is dangerous and against the law.  This medicine may cause accidental overdose and death if it taken by other adults, children, or pets. Mix any unused medicine with a substance like cat litter or coffee grounds. Then throw the medicine away in a sealed container like a sealed bag or a coffee can with a lid. Do not use the medicine after the expiration date.  Store at room temperature between 20 and 25 degrees C (68 and 77 degrees F).  What should I tell my health care provider before I take this medicine?  They need to know if you have any of  these conditions:  · brain tumor  · Crohn's disease, inflammatory bowel disease, or ulcerative colitis  · drug abuse or addiction  · head injury  · heart or circulation problems  · if you often drink alcohol  · kidney disease or problems going to the bathroom  · liver disease  · lung disease, asthma, or breathing problems  · an unusual or allergic reaction to acetaminophen, oxycodone, other opioid analgesics, other medicines, foods, dyes, or preservatives  · pregnant or trying to get pregnant  · breast-feeding  What should I watch for while using this medicine?  Tell your doctor or health care professional if your pain does not go away, if it gets worse, or if you have new or a different type of pain. You may develop tolerance to the medicine. Tolerance means that you will need a higher dose of the medication for pain relief. Tolerance is normal and is expected if you take this medicine for a long time.  Do not suddenly stop taking your medicine because you may develop a severe reaction. Your body becomes used to the medicine. This does NOT mean you are addicted. Addiction is a behavior related to getting and using a drug for a non-medical reason. If you have pain, you have a medical reason to take pain medicine. Your doctor will tell you how much medicine to take. If your doctor wants you to stop the medicine, the dose will be slowly lowered over time to avoid any side effects.  There are different types of narcotic medicines (opiates). If you take more than one type at the same time or if you are taking another medicine that also causes drowsiness, you may have more side effects. Give your health care provider a list of all medicines you use. Your doctor will tell you how much medicine to take. Do not take more medicine than directed. Call emergency for help if you have problems breathing or unusual sleepiness.  Do not take other medicines that contain acetaminophen with this medicine. Always read labels  carefully. If you have questions, ask your doctor or pharmacist.  If you take too much acetaminophen get medical help right away. Too much acetaminophen can be very dangerous and cause liver damage. Even if you do not have symptoms, it is important to get help right away.  You may get drowsy or dizzy. Do not drive, use machinery, or do anything that needs mental alertness until you know how this medicine affects you. Do not stand or sit up quickly, especially if you are an older patient. This reduces the risk of dizzy or fainting spells. Alcohol may interfere with the effect of this medicine. Avoid alcoholic drinks.  The medicine will cause constipation. Try to have a bowel movement at least every 2 to 3 days. If you do not have a bowel movement for 3 days, call your doctor or health care professional.  Your mouth may get dry. Chewing sugarless gum or sucking hard candy, and drinking plenty or water may help. Contact your doctor if the problem does not go away or is severe.  NOTE:This sheet is a summary. It may not cover all possible information. If you have questions about this medicine, talk to your doctor, pharmacist, or health care provider. Copyright© 2017 [employer]      ANESTHESIA  -For the first 24 hours after surgery:  Do not drive, use heavy equipment, make important decisions, or drink alcohol  -It is normal to feel sleepy for several hours.  Rest until you are more awake.  -Have someone stay with you, if needed.  They can watch for problems and help keep you safe.  -Some possible post anesthesia side effects include: nausea and vomiting, sore throat and hoarseness, sleepiness, and dizziness.    PAIN  -If you have pain after surgery, pain medicine will help you feel better.  Take it as directed, before pain becomes severe.  Most pain relievers taken by mouth need at least 20-30 minutes to start working.  -Do not drive or drink alcohol while taking pain medicine.  -Pain medication can upset your  stomach.  Taking them with a little food may help.  -Other ways to help control pain: elevation, ice, and relaxation  -Call your surgeon if still having unmanageable pain an hour after taking pain medicine.  -Pain medicine can cause constipation.  Taking an over-the counter stool softener while on prescription pain medicine and drinking plenty of fluids can prevent this side effect.  -Call your surgeon if you have severe side effects like: breathing problems, trouble waking up, dizziness, confusion, or severe constipation.    NAUSEA  -Some people have nausea after surgery.  This is often because of anesthesia, pain, pain medicine, or the stress of surgery.  -Do not push yourself to eat.  Start off with clear liquids and soup.  Slowly move to solid foods.  Don't eat fatty, rich, spicy foods at first.  Eat smaller amounts.  -If you develop persistent nausea and vomiting please notify your surgeon immediately.    BLEEDING  -Different types of surgery require different types of care and dressing changes.  It is important to follow all instructions and advice from your surgeon.  Change dressing as directed.  Call your surgeon for any concerns regarding postop bleeding.    SIGNS OF INFECTION  -Signs of infection include: fever, swelling, drainage, and redness  -Notify your surgeon if you have a fever of 100.4 F (38.0 C) or higher.  -Notify your surgeon if you notice redness, swelling, increased pain, pus, or a foul smell at the incision site.

## 2020-12-09 NOTE — INTERVAL H&P NOTE
The patient has been examined and the H&P has been reviewed:    I concur with the findings and no changes have occurred since H&P was written.    Surgery risks, benefits and alternative options discussed and understood by patient/family.          Active Hospital Problems    Diagnosis  POA    Preoperative exam for gynecologic surgery [Z01.818]  Not Applicable      Resolved Hospital Problems   No resolved problems to display.

## 2020-12-09 NOTE — OP NOTE
12/09/2020    Procedure: hysteroscopy dilation and currettage, right vulvar biopsy and removal of sebacceous cyst     Pre-Op Diagnosis: AUB, vulvar itching, vulvar sebacceous cyst     Post-Op Diagnosis: same     Anesthesia: general     Complications: None    Condition: Stable    EBL: 10 cc    Primary Surgeon: Caitlyn Feng    Assistant: none     Findings: atrophic appearing endometrium, 1/2 cm sebaceous cyst     Specimen: endometrial curettings, sebaceous cyst, thin appearing vulvar skin     TECHNIQUE: The patient was taken to the Operating Room where her MAC anesthesia  was found to be adequate. Her legs were placed in Khai stirrups. She was   then prepared and draped in normal sterile fashion.     An weighted speculum was placed into the vagina and a right angle retractor was used to visualize the cervix. Single tooth tenaculum was placed at the anterior cervix.    The cervix was dilated using Hegar dilators to 6. The hysteroscope was advanced through the cervix into the uterus where the above findings were seen. The myosure was used to take a sample from the entire endometrium.     The single tooth tenaculum was removed and the sites were made hemostatic with silver nitrate. No vaginal bleeding was noted.     A 4mm punch biopsy was used to biopsy the right vulva. It was hemostatic. A incision was made over the 1/2 cm  sebacous cyst and the cyst edges were grasped and the entire cyst wall was excised. Material consisent with sebaceous cyst. The incision was closed with 3-0 monocryl in mattress suture.      The patient tolerated the procedure well. All instruments were then removed   from the vagina. The patient was then taken to the PACU in stable condition.

## 2020-12-09 NOTE — TRANSFER OF CARE
"Anesthesia Transfer of Care Note    Patient: Azucena Morrison    Procedure(s) Performed: Procedure(s) (LRB):  HYSTEROSCOPY, WITH DILATION AND CURETTAGE OF UTERUS (MYOSURE) (N/A)    Patient location: OPS    Anesthesia Type: MAC    Transport from OR: Transported from OR on room air with adequate spontaneous ventilation    Post pain: adequate analgesia    Post assessment: no apparent anesthetic complications    Post vital signs: stable    Level of consciousness: awake, alert and oriented    Nausea/Vomiting: no nausea/vomiting    Complications: none    Transfer of care protocol was followed      Last vitals:   Visit Vitals  /70   Pulse 73   Temp 36.6 °C (97.9 °F) (Skin)   Resp 18   Ht 5' 2" (1.575 m)   Wt 62.6 kg (138 lb)   LMP  (LMP Unknown)   SpO2 100%   Breastfeeding No   BMI 25.24 kg/m²     "

## 2020-12-09 NOTE — ANESTHESIA PREPROCEDURE EVALUATION
2020  Azucena Morrison is a 70 y.o., female presents for hysteroscopy under GA/MAC.    Past Medical History:   Diagnosis Date    Chronic kidney disease, stage 3     Diabetes mellitus     Diabetes mellitus, type 2     Hypertension     Malnutrition of moderate degree 2020    UTI (urinary tract infection) 2020     Past Surgical History:   Procedure Laterality Date    CATHETERIZATION OF URETER Bilateral 2020    Procedure: CATHETERIZATION, URETER;  Surgeon: Kvng Cunningham MD;  [facility];  Service: Urology;  Laterality: Bilateral;     SECTION, CLASSIC      x2    CHOLECYSTECTOMY      COLON SURGERY      [facility]    COLONOSCOPY N/A 2018    Procedure: COLONOSCOPY;  Surgeon: Gibran Aguayo MD;  Location: [facility];  Service: Endoscopy;  Laterality: N/A;    COLOSTOMY Left     CYSTOSCOPY N/A 2020    Procedure: CYSTOSCOPY;  Surgeon: Kvng Cunningham MD;  Location: [facility] OR [address] [place] [address];  Service: Urology;  Laterality: N/A;    CYSTOSCOPY WITH URETEROSCOPY, RETROGRADE PYELOGRAPHY, AND INSERTION OF STENT Left 2019    Procedure: CYSTOSCOPY, WITH RETROGRADE PYELOGRAM AND URETERAL STENT INSERTION with placement of a muir cath;  Surgeon: Ulisses Horton MD;  Location: [facility] OR;  Service: General;  Laterality: Left;    ILEOSTOMY  2020    Procedure: CREATION, ILEOSTOMY;  Surgeon: Fabiana Valiente MD;  [facility];  Service: Colon and Rectal;;    INCISION AND DRAINAGE OF ABSCESS N/A 2019    Procedure: INCISION AND DRAINAGE, ABSCESS;  Surgeon: Ulisses Horton MD;  Location: [facility] OR;  Service: General;  Laterality: N/A;    INCISION OF ABDOMINAL WALL N/A 8/10/2018    Procedure: INCISION, ABDOMINAL WALL;  Surgeon: Ulisses Horton MD;  Location: [facility] OR;  Service: General;  Laterality: N/A;    INCISIONAL  HERNIA REPAIR Right 2010    LOW ANTERIOR RESECTION OF COLON N/A 7/31/2020    Procedure: RESECTION, COLON, LOW ANTERIOR;  Surgeon: Fabiana Valiente MD;  Location: [facility] OR 2ND FLR;  Service: Colon and Rectal;  Laterality: N/A;    LYSIS OF ADHESIONS  7/31/2020    Procedure: LYSIS, ADHESIONS;  Surgeon: Fabiana Valiente MD;  Location: [facility] OR 2ND FLR;  Service: Colon and Rectal;;         Anesthesia Evaluation    I have reviewed the Patient Summary Reports.    I have reviewed the Nursing Notes. I have reviewed the NPO Status.   I have reviewed the Medications.     Review of Systems  Anesthesia Hx:  No problems with previous Anesthesia Denies Hx of Anesthetic complications  Denies Family Hx of Anesthesia complications.   Denies Personal Hx of Anesthesia complications.   Social:  No Alcohol Use  Tobacco Use: , quit smoking >10 years ago   Hematology/Oncology:         -- Anemia:   Cardiovascular:   Hypertension Denies Dysrhythmias.   Denies Angina. ECG has been reviewed.    Pulmonary:  Pulmonary Normal  Denies Shortness of breath.    Renal/:   Chronic Renal Disease renal calculi    Hepatic/GI:  Hepatic/GI Normal  Denies GERD. Denies Liver Disease.    Neurological:  Neurology Normal Denies TIA.  Denies CVA. Denies Seizures.    Endocrine:   Diabetes  Diabetes, Type 2 Diabetes , controlled by oral hypoglycemics, insulin. Typical AM glucose range: 170 , most recent HgA1c value was 7.9 on 8/2018.        Physical Exam  General:  Well nourished    Airway/Jaw/Neck:  Airway Findings: Mouth Opening: Normal Tongue: Normal  General Airway Assessment: Adult  Mallampati: II  TM Distance: Normal, at least 6 cm       Chest/Lungs:  Chest/Lungs Findings: Clear to auscultation, Normal Respiratory Rate     Heart/Vascular:  Heart Findings: Rate: Normal  Rhythm: Regular Rhythm  Sounds: Normal        Mental Status:  Mental Status Findings:  Cooperative, Alert and Oriented         Anesthesia Plan  Type of Anesthesia, risks & benefits  discussed:  Anesthesia Type:  MAC, general  Patient's Preference:   Intra-op Monitoring Plan: standard ASA monitors  Intra-op Monitoring Plan Comments:   Post Op Pain Control Plan: per primary service following discharge from PACU  Post Op Pain Control Plan Comments:   Induction:   IV  Beta Blocker:         Informed Consent: Patient understands risks and agrees with Anesthesia plan.  Questions answered. Anesthesia consent signed with patient.  ASA Score: 3     Day of Surgery Review of History & Physical:  There are no significant changes.          Ready For Surgery From Anesthesia Perspective.

## 2020-12-10 NOTE — ANESTHESIA POSTPROCEDURE EVALUATION
Anesthesia Post Evaluation    Patient: Azucena Morrison    Procedure(s) Performed: Procedure(s) (LRB):  HYSTEROSCOPY, WITH DILATION AND CURETTAGE OF UTERUS (MYOSURE) (N/A)    Final Anesthesia Type: general    Patient location during evaluation: PACU  Patient participation: Yes- Able to Participate  Level of consciousness: awake and alert, oriented and awake  Post-procedure vital signs: reviewed and stable  Pain management: adequate  Airway patency: patent    PONV status at discharge: No PONV  Anesthetic complications: no      Cardiovascular status: blood pressure returned to baseline  Respiratory status: unassisted and room air  Hydration status: euvolemic  Follow-up not needed.          Vitals Value Taken Time   /72 12/09/20 1315   Temp 36.4 °C (97.6 °F) 12/09/20 1315   Pulse 69 12/09/20 1315   Resp 20 12/09/20 1315   SpO2 99 % 12/09/20 1315         No case tracking events are documented in the log.      Pain/Patience Score: Pain Rating Prior to Med Admin: 0 (12/9/2020 12:30 PM)  Pain Rating Post Med Admin: 0 (12/9/2020  1:25 PM)

## 2020-12-11 LAB
FINAL PATHOLOGIC DIAGNOSIS: NORMAL
GROSS: NORMAL
Lab: NORMAL

## 2020-12-11 NOTE — PHYSICIAN QUERY
PT Name: Azucena Morrison  MR #: [id number]     Documentation Clarification      CDS/: Maribel Rodas               Contact information:    This form is a permanent document in the medical record.     Query Date: December 11, 2020    By submitting this query, we are merely seeking further clarification of documentation. Please utilize your independent clinical judgment when addressing the question(s) below.    The Medical Record reflects the following:    Supporting Clinical Findings Location in Medical Record   Dr. Feng, please document the size of the repair after the cyst was removed.  Thank you!                                                                                 Provider, please provide diagnosis or diagnoses associated with above clinical findings.    [   ] _____________________   [   ] Other (please specify): ____________   [  ] Clinically undetermined                                                                                                           Present on admission (POA) status:   [   ] Yes (Y)                          [  ] Clinically Undetermined (W)  [   ] No (N)                            [   ] Documentation insufficient to determine if condition is POA (U)       Ok, I addend the note

## 2020-12-21 ENCOUNTER — OFFICE VISIT (OUTPATIENT)
Dept: OBSTETRICS AND GYNECOLOGY | Facility: CLINIC | Age: 70
End: 2020-12-21
Payer: MEDICARE

## 2020-12-21 VITALS
WEIGHT: 144.38 LBS | SYSTOLIC BLOOD PRESSURE: 128 MMHG | HEIGHT: 62 IN | DIASTOLIC BLOOD PRESSURE: 87 MMHG | BODY MASS INDEX: 26.57 KG/M2

## 2020-12-21 DIAGNOSIS — Z98.890 STATUS POST HYSTEROSCOPY: ICD-10-CM

## 2020-12-21 DIAGNOSIS — N95.0 PMB (POSTMENOPAUSAL BLEEDING): Primary | ICD-10-CM

## 2020-12-21 PROCEDURE — 1101F PT FALLS ASSESS-DOCD LE1/YR: CPT | Mod: CPTII,S$GLB,, | Performed by: OBSTETRICS & GYNECOLOGY

## 2020-12-21 PROCEDURE — 3008F BODY MASS INDEX DOCD: CPT | Mod: CPTII,S$GLB,, | Performed by: OBSTETRICS & GYNECOLOGY

## 2020-12-21 PROCEDURE — 99024 POSTOP FOLLOW-UP VISIT: CPT | Mod: S$GLB,,, | Performed by: OBSTETRICS & GYNECOLOGY

## 2020-12-21 PROCEDURE — 99024 PR POST-OP FOLLOW-UP VISIT: ICD-10-PCS | Mod: S$GLB,,, | Performed by: OBSTETRICS & GYNECOLOGY

## 2020-12-21 PROCEDURE — 3288F PR FALLS RISK ASSESSMENT DOCUMENTED: ICD-10-PCS | Mod: CPTII,S$GLB,, | Performed by: OBSTETRICS & GYNECOLOGY

## 2020-12-21 PROCEDURE — 99999 PR PBB SHADOW E&M-EST. PATIENT-LVL III: ICD-10-PCS | Mod: PBBFAC,,, | Performed by: OBSTETRICS & GYNECOLOGY

## 2020-12-21 PROCEDURE — 3008F PR BODY MASS INDEX (BMI) DOCUMENTED: ICD-10-PCS | Mod: CPTII,S$GLB,, | Performed by: OBSTETRICS & GYNECOLOGY

## 2020-12-21 PROCEDURE — 1101F PR PT FALLS ASSESS DOC 0-1 FALLS W/OUT INJ PAST YR: ICD-10-PCS | Mod: CPTII,S$GLB,, | Performed by: OBSTETRICS & GYNECOLOGY

## 2020-12-21 PROCEDURE — 1126F AMNT PAIN NOTED NONE PRSNT: CPT | Mod: S$GLB,,, | Performed by: OBSTETRICS & GYNECOLOGY

## 2020-12-21 PROCEDURE — 99999 PR PBB SHADOW E&M-EST. PATIENT-LVL III: CPT | Mod: PBBFAC,,, | Performed by: OBSTETRICS & GYNECOLOGY

## 2020-12-21 PROCEDURE — 3288F FALL RISK ASSESSMENT DOCD: CPT | Mod: CPTII,S$GLB,, | Performed by: OBSTETRICS & GYNECOLOGY

## 2020-12-21 PROCEDURE — 1126F PR PAIN SEVERITY QUANTIFIED, NO PAIN PRESENT: ICD-10-PCS | Mod: S$GLB,,, | Performed by: OBSTETRICS & GYNECOLOGY

## 2020-12-21 NOTE — PROGRESS NOTES
CC: s/p [facility]    HPI:   Azucena Morrison is a 70 y.o. here for f/u [facility] and vulvar biopsy  on 12/9/20. She reports normal bowel movements and urination. Pain is controlled with OTC medications.  She has had no bleeding.     Vitals:    12/21/20 1022   BP: 128/87       PHYSICAL EXAM:   APPEARANCE: Well nourished, well developed, in no acute distress.  ABDOMEN: Soft. No tenderness or masses. No hernias.   PELVIC:   VULVA: No lesions. Normal female genitalia. Well healing biopsy spot.   URETHRAL MEATUS: Normal size and location, no lesions, no prolapse.  URETHRA: No masses, tenderness, prolapse or scarring.  VAGINA: Moist and well rugated, no discharge, no significant cystocele or rectocele.  CERVIX: No lesions and discharge.  UTERUS: Normal size, regular shape, mobile, non-tender, bladder base nontender.  ADNEXA: No masses or tenderness.    Pathology: 1.  VULVAR BIOPSY SHOWING A POLYPOID FRAGMENT OF SQUAMOUS MUCOSA COMPATIBLE   WITH A SKIN TAG.  IN ADDITION, THERE ARE FRAGMENTS OF BENIGN FIBROADIPOSE   TISSUE WITH POLARIZABLE MATERIAL PRESENT.   2.  FRAGMENTS OF INACTIVE ENDOMETRIUM AND UNDERLYING SMOOTH MUSCLE   3.  EPIDERMAL INCLUSION CYST WITH DERMAL FIBROSIS AND PATCHY CHRONIC   INFLAMMATION PRESENT.    1. PMB (postmenopausal bleeding)    2. Status post hysteroscopy          PLAN:   1. No indication for hysterectomy at this time. Sent message to oncology to confirm.   2. Vulvar biopsy normal, ok to use steroid cream PRN and try to find irritant.

## 2021-01-06 ENCOUNTER — HOSPITAL ENCOUNTER (OUTPATIENT)
Dept: RADIOLOGY | Facility: HOSPITAL | Age: 71
Discharge: HOME | End: 2021-01-06
Attending: COLON & RECTAL SURGERY
Payer: MEDICARE

## 2021-01-06 DIAGNOSIS — Z93.2 ILEOSTOMY IN PLACE: ICD-10-CM

## 2021-01-06 PROCEDURE — 74270 FL GASTROGRAFIN ENEMA WATER SOLUBLE: ICD-10-PCS | Mod: 26,,, | Performed by: RADIOLOGY

## 2021-01-06 PROCEDURE — 74270 X-RAY XM COLON 1CNTRST STD: CPT | Mod: TC

## 2021-01-06 PROCEDURE — 25500020 PHARM REV CODE 255: Performed by: COLON & RECTAL SURGERY

## 2021-01-06 PROCEDURE — 74270 X-RAY XM COLON 1CNTRST STD: CPT | Mod: 26,,, | Performed by: RADIOLOGY

## 2021-01-06 RX ADMIN — DIATRIZOATE MEGLUMINE AND DIATRIZOATE SODIUM 480 ML: 660; 100 LIQUID ORAL; RECTAL at 08:01

## 2021-01-13 ENCOUNTER — OFFICE VISIT (OUTPATIENT)
Dept: WOUND CARE | Facility: CLINIC | Age: 71
End: 2021-01-13
Payer: MEDICARE

## 2021-01-13 ENCOUNTER — LAB VISIT (OUTPATIENT)
Dept: LAB | Facility: HOSPITAL | Age: 71
End: 2021-01-13
Attending: COLON & RECTAL SURGERY
Payer: MEDICARE

## 2021-01-13 ENCOUNTER — OFFICE VISIT (OUTPATIENT)
Dept: SURGERY | Facility: CLINIC | Age: 71
End: 2021-01-13
Attending: COLON & RECTAL SURGERY
Payer: MEDICARE

## 2021-01-13 VITALS
DIASTOLIC BLOOD PRESSURE: 84 MMHG | HEART RATE: 103 BPM | SYSTOLIC BLOOD PRESSURE: 178 MMHG | HEIGHT: 62 IN | BODY MASS INDEX: 27.18 KG/M2 | WEIGHT: 147.69 LBS

## 2021-01-13 DIAGNOSIS — Z43.2 ATTENTION TO ILEOSTOMY: Primary | ICD-10-CM

## 2021-01-13 DIAGNOSIS — Z93.2 ILEOSTOMY IN PLACE: Primary | ICD-10-CM

## 2021-01-13 DIAGNOSIS — Z01.818 PREOP TESTING: Primary | ICD-10-CM

## 2021-01-13 DIAGNOSIS — L24.9 IRRITANT CONTACT DERMATITIS DUE TO ILEOSTOMY: ICD-10-CM

## 2021-01-13 DIAGNOSIS — K94.19 IRRITANT CONTACT DERMATITIS DUE TO ILEOSTOMY: ICD-10-CM

## 2021-01-13 DIAGNOSIS — Z01.818 PREOP TESTING: ICD-10-CM

## 2021-01-13 LAB
ALBUMIN SERPL BCP-MCNC: 3.7 G/DL (ref 3.5–5.2)
ALP SERPL-CCNC: 106 U/L (ref 55–135)
ALT SERPL W/O P-5'-P-CCNC: 26 U/L (ref 10–44)
ANION GAP SERPL CALC-SCNC: 9 MMOL/L (ref 8–16)
AST SERPL-CCNC: 25 U/L (ref 10–40)
BASOPHILS # BLD AUTO: 0.04 K/UL (ref 0–0.2)
BASOPHILS NFR BLD: 0.5 % (ref 0–1.9)
BILIRUB SERPL-MCNC: 0.3 MG/DL (ref 0.1–1)
BUN SERPL-MCNC: 18 MG/DL (ref 8–23)
CALCIUM SERPL-MCNC: 9.7 MG/DL (ref 8.7–10.5)
CHLORIDE SERPL-SCNC: 105 MMOL/L (ref 95–110)
CO2 SERPL-SCNC: 26 MMOL/L (ref 23–29)
CREAT SERPL-MCNC: 1 MG/DL (ref 0.5–1.4)
DIFFERENTIAL METHOD: ABNORMAL
EOSINOPHIL # BLD AUTO: 0.1 K/UL (ref 0–0.5)
EOSINOPHIL NFR BLD: 1.6 % (ref 0–8)
ERYTHROCYTE [DISTWIDTH] IN BLOOD BY AUTOMATED COUNT: 14.1 % (ref 11.5–14.5)
EST. GFR  (AFRICAN AMERICAN): >60 ML/MIN/1.73 M^2
EST. GFR  (NON AFRICAN AMERICAN): 57.6 ML/MIN/1.73 M^2
GLUCOSE SERPL-MCNC: 121 MG/DL (ref 70–110)
HCT VFR BLD AUTO: 37.7 % (ref 37–48.5)
HGB BLD-MCNC: 12.2 G/DL (ref 12–16)
IMM GRANULOCYTES # BLD AUTO: 0.03 K/UL (ref 0–0.04)
IMM GRANULOCYTES NFR BLD AUTO: 0.4 % (ref 0–0.5)
LYMPHOCYTES # BLD AUTO: 1.7 K/UL (ref 1–4.8)
LYMPHOCYTES NFR BLD: 23.6 % (ref 18–48)
MCH RBC QN AUTO: 29.4 PG (ref 27–31)
MCHC RBC AUTO-ENTMCNC: 32.4 G/DL (ref 32–36)
MCV RBC AUTO: 91 FL (ref 82–98)
MONOCYTES # BLD AUTO: 0.4 K/UL (ref 0.3–1)
MONOCYTES NFR BLD: 5.7 % (ref 4–15)
NEUTROPHILS # BLD AUTO: 5 K/UL (ref 1.8–7.7)
NEUTROPHILS NFR BLD: 68.2 % (ref 38–73)
NRBC BLD-RTO: 0 /100 WBC
PLATELET # BLD AUTO: 333 K/UL (ref 150–350)
PMV BLD AUTO: 8.6 FL (ref 9.2–12.9)
POTASSIUM SERPL-SCNC: 4.1 MMOL/L (ref 3.5–5.1)
PROT SERPL-MCNC: 8.4 G/DL (ref 6–8.4)
RBC # BLD AUTO: 4.15 M/UL (ref 4–5.4)
SODIUM SERPL-SCNC: 140 MMOL/L (ref 136–145)
WBC # BLD AUTO: 7.37 K/UL (ref 3.9–12.7)

## 2021-01-13 PROCEDURE — 99999 PR PBB SHADOW E&M-EST. PATIENT-LVL V: ICD-10-PCS | Mod: PBBFAC,,, | Performed by: COLON & RECTAL SURGERY

## 2021-01-13 PROCEDURE — 1101F PR PT FALLS ASSESS DOC 0-1 FALLS W/OUT INJ PAST YR: ICD-10-PCS | Mod: CPTII,S$GLB,, | Performed by: COLON & RECTAL SURGERY

## 2021-01-13 PROCEDURE — 99214 PR OFFICE/OUTPT VISIT, EST, LEVL IV, 30-39 MIN: ICD-10-PCS | Mod: S$GLB,,, | Performed by: COLON & RECTAL SURGERY

## 2021-01-13 PROCEDURE — 3079F PR MOST RECENT DIASTOLIC BLOOD PRESSURE 80-89 MM HG: ICD-10-PCS | Mod: CPTII,S$GLB,, | Performed by: COLON & RECTAL SURGERY

## 2021-01-13 PROCEDURE — 1159F MED LIST DOCD IN RCRD: CPT | Mod: S$GLB,,, | Performed by: COLON & RECTAL SURGERY

## 2021-01-13 PROCEDURE — 99999 PR PBB SHADOW E&M-EST. PATIENT-LVL II: ICD-10-PCS | Mod: PBBFAC,,, | Performed by: CLINICAL NURSE SPECIALIST

## 2021-01-13 PROCEDURE — 3008F BODY MASS INDEX DOCD: CPT | Mod: CPTII,S$GLB,, | Performed by: COLON & RECTAL SURGERY

## 2021-01-13 PROCEDURE — 3079F DIAST BP 80-89 MM HG: CPT | Mod: CPTII,S$GLB,, | Performed by: COLON & RECTAL SURGERY

## 2021-01-13 PROCEDURE — 1159F PR MEDICATION LIST DOCUMENTED IN MEDICAL RECORD: ICD-10-PCS | Mod: S$GLB,,, | Performed by: COLON & RECTAL SURGERY

## 2021-01-13 PROCEDURE — 3077F SYST BP >= 140 MM HG: CPT | Mod: CPTII,S$GLB,, | Performed by: COLON & RECTAL SURGERY

## 2021-01-13 PROCEDURE — 1125F AMNT PAIN NOTED PAIN PRSNT: CPT | Mod: S$GLB,,, | Performed by: COLON & RECTAL SURGERY

## 2021-01-13 PROCEDURE — 1125F PR PAIN SEVERITY QUANTIFIED, PAIN PRESENT: ICD-10-PCS | Mod: S$GLB,,, | Performed by: COLON & RECTAL SURGERY

## 2021-01-13 PROCEDURE — 36415 COLL VENOUS BLD VENIPUNCTURE: CPT

## 2021-01-13 PROCEDURE — 3288F FALL RISK ASSESSMENT DOCD: CPT | Mod: CPTII,S$GLB,, | Performed by: COLON & RECTAL SURGERY

## 2021-01-13 PROCEDURE — 99999 PR PBB SHADOW E&M-EST. PATIENT-LVL V: CPT | Mod: PBBFAC,,, | Performed by: COLON & RECTAL SURGERY

## 2021-01-13 PROCEDURE — 99024 POSTOP FOLLOW-UP VISIT: CPT | Mod: S$GLB,,, | Performed by: CLINICAL NURSE SPECIALIST

## 2021-01-13 PROCEDURE — 99999 PR PBB SHADOW E&M-EST. PATIENT-LVL II: CPT | Mod: PBBFAC,,, | Performed by: CLINICAL NURSE SPECIALIST

## 2021-01-13 PROCEDURE — 3077F PR MOST RECENT SYSTOLIC BLOOD PRESSURE >= 140 MM HG: ICD-10-PCS | Mod: CPTII,S$GLB,, | Performed by: COLON & RECTAL SURGERY

## 2021-01-13 PROCEDURE — 99024 PR POST-OP FOLLOW-UP VISIT: ICD-10-PCS | Mod: S$GLB,,, | Performed by: CLINICAL NURSE SPECIALIST

## 2021-01-13 PROCEDURE — 3288F PR FALLS RISK ASSESSMENT DOCUMENTED: ICD-10-PCS | Mod: CPTII,S$GLB,, | Performed by: COLON & RECTAL SURGERY

## 2021-01-13 PROCEDURE — 80053 COMPREHEN METABOLIC PANEL: CPT

## 2021-01-13 PROCEDURE — 85025 COMPLETE CBC W/AUTO DIFF WBC: CPT

## 2021-01-13 PROCEDURE — 1101F PT FALLS ASSESS-DOCD LE1/YR: CPT | Mod: CPTII,S$GLB,, | Performed by: COLON & RECTAL SURGERY

## 2021-01-13 PROCEDURE — 3008F PR BODY MASS INDEX (BMI) DOCUMENTED: ICD-10-PCS | Mod: CPTII,S$GLB,, | Performed by: COLON & RECTAL SURGERY

## 2021-01-13 PROCEDURE — 99214 OFFICE O/P EST MOD 30 MIN: CPT | Mod: S$GLB,,, | Performed by: COLON & RECTAL SURGERY

## 2021-01-14 ENCOUNTER — TELEPHONE (OUTPATIENT)
Dept: SURGERY | Facility: CLINIC | Age: 71
End: 2021-01-14

## 2021-01-19 ENCOUNTER — TELEPHONE (OUTPATIENT)
Dept: OBSTETRICS AND GYNECOLOGY | Facility: CLINIC | Age: 71
End: 2021-01-19

## 2021-01-19 ENCOUNTER — TELEPHONE (OUTPATIENT)
Dept: SURGERY | Facility: CLINIC | Age: 71
End: 2021-01-19

## 2021-01-19 RX ORDER — MEDROXYPROGESTERONE ACETATE 5 MG/1
5 TABLET ORAL DAILY
Qty: 90 TABLET | Refills: 4 | Status: ON HOLD | OUTPATIENT
Start: 2021-01-19 | End: 2021-02-09

## 2021-02-06 ENCOUNTER — LAB VISIT (OUTPATIENT)
Dept: INTERNAL MEDICINE | Facility: CLINIC | Age: 71
DRG: 330 | End: 2021-02-06
Attending: COLON & RECTAL SURGERY
Payer: MEDICARE

## 2021-02-06 DIAGNOSIS — Z01.818 PREOP TESTING: ICD-10-CM

## 2021-02-06 PROCEDURE — U0003 INFECTIOUS AGENT DETECTION BY NUCLEIC ACID (DNA OR RNA); SEVERE ACUTE RESPIRATORY SYNDROME CORONAVIRUS 2 (SARS-COV-2) (CORONAVIRUS DISEASE [COVID-19]), AMPLIFIED PROBE TECHNIQUE, MAKING USE OF HIGH THROUGHPUT TECHNOLOGIES AS DESCRIBED BY CMS-2020-01-R: HCPCS

## 2021-02-07 LAB — SARS-COV-2 RNA RESP QL NAA+PROBE: NOT DETECTED

## 2021-02-08 ENCOUNTER — TELEPHONE (OUTPATIENT)
Dept: SURGERY | Facility: CLINIC | Age: 71
End: 2021-02-08

## 2021-02-09 ENCOUNTER — ANESTHESIA (OUTPATIENT)
Dept: SURGERY | Facility: HOSPITAL | Age: 71
DRG: 330 | End: 2021-02-09
Payer: MEDICARE

## 2021-02-09 ENCOUNTER — HOSPITAL ENCOUNTER (INPATIENT)
Facility: HOSPITAL | Age: 71
LOS: 3 days | Discharge: HOME | DRG: 330 | End: 2021-02-12
Attending: COLON & RECTAL SURGERY | Admitting: COLON & RECTAL SURGERY
Payer: MEDICARE

## 2021-02-09 ENCOUNTER — ANESTHESIA EVENT (OUTPATIENT)
Dept: SURGERY | Facility: HOSPITAL | Age: 71
DRG: 330 | End: 2021-02-09
Payer: MEDICARE

## 2021-02-09 DIAGNOSIS — Z98.890 S/P CLOSURE OF ILEOSTOMY: Primary | ICD-10-CM

## 2021-02-09 DIAGNOSIS — Z93.2 ILEOSTOMY IN PLACE: ICD-10-CM

## 2021-02-09 LAB
ABO + RH BLD: NORMAL
ANION GAP SERPL CALC-SCNC: 11 MMOL/L (ref 8–16)
BASOPHILS # BLD AUTO: 0.02 K/UL (ref 0–0.2)
BASOPHILS NFR BLD: 0.2 % (ref 0–1.9)
BLD GP AB SCN CELLS X3 SERPL QL: NORMAL
BUN SERPL-MCNC: 32 MG/DL (ref 8–23)
CALCIUM SERPL-MCNC: 8.8 MG/DL (ref 8.7–10.5)
CHLORIDE SERPL-SCNC: 115 MMOL/L (ref 95–110)
CO2 SERPL-SCNC: 13 MMOL/L (ref 23–29)
CREAT SERPL-MCNC: 1.8 MG/DL (ref 0.5–1.4)
DIFFERENTIAL METHOD: ABNORMAL
EOSINOPHIL # BLD AUTO: 0 K/UL (ref 0–0.5)
EOSINOPHIL NFR BLD: 0.1 % (ref 0–8)
ERYTHROCYTE [DISTWIDTH] IN BLOOD BY AUTOMATED COUNT: 13.1 % (ref 11.5–14.5)
EST. GFR  (AFRICAN AMERICAN): 32.6 ML/MIN/1.73 M^2
EST. GFR  (NON AFRICAN AMERICAN): 28.3 ML/MIN/1.73 M^2
GLUCOSE SERPL-MCNC: 159 MG/DL (ref 70–110)
HCT VFR BLD AUTO: 37.7 % (ref 37–48.5)
HGB BLD-MCNC: 12.7 G/DL (ref 12–16)
IMM GRANULOCYTES # BLD AUTO: 0.07 K/UL (ref 0–0.04)
IMM GRANULOCYTES NFR BLD AUTO: 0.6 % (ref 0–0.5)
LYMPHOCYTES # BLD AUTO: 0.7 K/UL (ref 1–4.8)
LYMPHOCYTES NFR BLD: 6.2 % (ref 18–48)
MAGNESIUM SERPL-MCNC: 1.6 MG/DL (ref 1.6–2.6)
MCH RBC QN AUTO: 30.1 PG (ref 27–31)
MCHC RBC AUTO-ENTMCNC: 33.7 G/DL (ref 32–36)
MCV RBC AUTO: 89 FL (ref 82–98)
MONOCYTES # BLD AUTO: 0.1 K/UL (ref 0.3–1)
MONOCYTES NFR BLD: 0.7 % (ref 4–15)
NEUTROPHILS # BLD AUTO: 10.8 K/UL (ref 1.8–7.7)
NEUTROPHILS NFR BLD: 92.2 % (ref 38–73)
NRBC BLD-RTO: 0 /100 WBC
PHOSPHATE SERPL-MCNC: 4 MG/DL (ref 2.7–4.5)
PLATELET # BLD AUTO: 257 K/UL (ref 150–350)
PMV BLD AUTO: 8.7 FL (ref 9.2–12.9)
POCT GLUCOSE: 152 MG/DL (ref 70–110)
POTASSIUM SERPL-SCNC: 4.3 MMOL/L (ref 3.5–5.1)
RBC # BLD AUTO: 4.22 M/UL (ref 4–5.4)
SODIUM SERPL-SCNC: 139 MMOL/L (ref 136–145)
WBC # BLD AUTO: 11.7 K/UL (ref 3.9–12.7)

## 2021-02-09 PROCEDURE — 94761 N-INVAS EAR/PLS OXIMETRY MLT: CPT

## 2021-02-09 PROCEDURE — 88304 PR  SURG PATH,LEVEL III: ICD-10-PCS | Mod: 26,,, | Performed by: PATHOLOGY

## 2021-02-09 PROCEDURE — 63600175 PHARM REV CODE 636 W HCPCS: Performed by: NURSE PRACTITIONER

## 2021-02-09 PROCEDURE — 36415 COLL VENOUS BLD VENIPUNCTURE: CPT

## 2021-02-09 PROCEDURE — 25000003 PHARM REV CODE 250: Performed by: STUDENT IN AN ORGANIZED HEALTH CARE EDUCATION/TRAINING PROGRAM

## 2021-02-09 PROCEDURE — 82962 GLUCOSE BLOOD TEST: CPT | Performed by: COLON & RECTAL SURGERY

## 2021-02-09 PROCEDURE — 25000003 PHARM REV CODE 250: Performed by: COLON & RECTAL SURGERY

## 2021-02-09 PROCEDURE — 88304 TISSUE EXAM BY PATHOLOGIST: CPT | Performed by: PATHOLOGY

## 2021-02-09 PROCEDURE — 25000003 PHARM REV CODE 250: Performed by: NURSE PRACTITIONER

## 2021-02-09 PROCEDURE — 25000003 PHARM REV CODE 250: Performed by: NURSE ANESTHETIST, CERTIFIED REGISTERED

## 2021-02-09 PROCEDURE — 37000008 HC ANESTHESIA 1ST 15 MINUTES: Performed by: COLON & RECTAL SURGERY

## 2021-02-09 PROCEDURE — 86900 BLOOD TYPING SEROLOGIC ABO: CPT

## 2021-02-09 PROCEDURE — 44620 REPAIR BOWEL OPENING: CPT | Mod: ,,, | Performed by: COLON & RECTAL SURGERY

## 2021-02-09 PROCEDURE — 45330 PR SIGMOIDOSCOPY,DIAG2STIC: ICD-10-PCS | Mod: 51,,, | Performed by: COLON & RECTAL SURGERY

## 2021-02-09 PROCEDURE — 37000009 HC ANESTHESIA EA ADD 15 MINS: Performed by: COLON & RECTAL SURGERY

## 2021-02-09 PROCEDURE — 86850 RBC ANTIBODY SCREEN: CPT

## 2021-02-09 PROCEDURE — 80048 BASIC METABOLIC PNL TOTAL CA: CPT

## 2021-02-09 PROCEDURE — 84100 ASSAY OF PHOSPHORUS: CPT

## 2021-02-09 PROCEDURE — S0030 INJECTION, METRONIDAZOLE: HCPCS | Performed by: NURSE PRACTITIONER

## 2021-02-09 PROCEDURE — 36000708 HC OR TIME LEV III 1ST 15 MIN: Performed by: COLON & RECTAL SURGERY

## 2021-02-09 PROCEDURE — 63600175 PHARM REV CODE 636 W HCPCS: Performed by: NURSE ANESTHETIST, CERTIFIED REGISTERED

## 2021-02-09 PROCEDURE — 71000033 HC RECOVERY, INTIAL HOUR: Performed by: COLON & RECTAL SURGERY

## 2021-02-09 PROCEDURE — 27201423 OPTIME MED/SURG SUP & DEVICES STERILE SUPPLY: Performed by: COLON & RECTAL SURGERY

## 2021-02-09 PROCEDURE — 20600001 HC STEP DOWN PRIVATE ROOM

## 2021-02-09 PROCEDURE — 63600175 PHARM REV CODE 636 W HCPCS: Performed by: STUDENT IN AN ORGANIZED HEALTH CARE EDUCATION/TRAINING PROGRAM

## 2021-02-09 PROCEDURE — 45330 DIAGNOSTIC SIGMOIDOSCOPY: CPT | Mod: 51,,, | Performed by: COLON & RECTAL SURGERY

## 2021-02-09 PROCEDURE — 85025 COMPLETE CBC W/AUTO DIFF WBC: CPT

## 2021-02-09 PROCEDURE — D9220A PRA ANESTHESIA: ICD-10-PCS | Mod: CRNA,,, | Performed by: NURSE ANESTHETIST, CERTIFIED REGISTERED

## 2021-02-09 PROCEDURE — 44620 PR CLOSE ENTEROSTOMY: ICD-10-PCS | Mod: ,,, | Performed by: COLON & RECTAL SURGERY

## 2021-02-09 PROCEDURE — 71000015 HC POSTOP RECOV 1ST HR: Performed by: COLON & RECTAL SURGERY

## 2021-02-09 PROCEDURE — D9220A PRA ANESTHESIA: Mod: ANES,,, | Performed by: ANESTHESIOLOGY

## 2021-02-09 PROCEDURE — S0030 INJECTION, METRONIDAZOLE: HCPCS | Performed by: STUDENT IN AN ORGANIZED HEALTH CARE EDUCATION/TRAINING PROGRAM

## 2021-02-09 PROCEDURE — D9220A PRA ANESTHESIA: Mod: CRNA,,, | Performed by: NURSE ANESTHETIST, CERTIFIED REGISTERED

## 2021-02-09 PROCEDURE — 36000709 HC OR TIME LEV III EA ADD 15 MIN: Performed by: COLON & RECTAL SURGERY

## 2021-02-09 PROCEDURE — 88304 TISSUE EXAM BY PATHOLOGIST: CPT | Mod: 26,,, | Performed by: PATHOLOGY

## 2021-02-09 PROCEDURE — 71000016 HC POSTOP RECOV ADDL HR: Performed by: COLON & RECTAL SURGERY

## 2021-02-09 PROCEDURE — D9220A PRA ANESTHESIA: ICD-10-PCS | Mod: ANES,,, | Performed by: ANESTHESIOLOGY

## 2021-02-09 PROCEDURE — 83735 ASSAY OF MAGNESIUM: CPT

## 2021-02-09 RX ORDER — ENOXAPARIN SODIUM 100 MG/ML
40 INJECTION SUBCUTANEOUS EVERY 24 HOURS
Status: DISCONTINUED | OUTPATIENT
Start: 2021-02-09 | End: 2021-02-09

## 2021-02-09 RX ORDER — MIDAZOLAM HYDROCHLORIDE 1 MG/ML
INJECTION, SOLUTION INTRAMUSCULAR; INTRAVENOUS
Status: DISCONTINUED | OUTPATIENT
Start: 2021-02-09 | End: 2021-02-09

## 2021-02-09 RX ORDER — LIDOCAINE HYDROCHLORIDE ANHYDROUS AND DEXTROSE MONOHYDRATE .8; 5 G/100ML; G/100ML
INJECTION, SOLUTION INTRAVENOUS CONTINUOUS PRN
Status: DISCONTINUED | OUTPATIENT
Start: 2021-02-09 | End: 2021-02-09

## 2021-02-09 RX ORDER — SODIUM CHLORIDE 0.9 % (FLUSH) 0.9 %
10 SYRINGE (ML) INJECTION EVERY 6 HOURS PRN
Status: DISCONTINUED | OUTPATIENT
Start: 2021-02-09 | End: 2021-02-12 | Stop reason: HOSPADM

## 2021-02-09 RX ORDER — ACETAMINOPHEN 500 MG
1000 TABLET ORAL EVERY 8 HOURS
Status: DISCONTINUED | OUTPATIENT
Start: 2021-02-10 | End: 2021-02-12 | Stop reason: HOSPADM

## 2021-02-09 RX ORDER — SODIUM CHLORIDE 9 MG/ML
INJECTION, SOLUTION INTRAVENOUS CONTINUOUS
Status: DISCONTINUED | OUTPATIENT
Start: 2021-02-09 | End: 2021-02-12

## 2021-02-09 RX ORDER — TRIPROLIDINE/PSEUDOEPHEDRINE 2.5MG-60MG
600 TABLET ORAL
Status: COMPLETED | OUTPATIENT
Start: 2021-02-09 | End: 2021-02-09

## 2021-02-09 RX ORDER — ACETAMINOPHEN 10 MG/ML
1000 INJECTION, SOLUTION INTRAVENOUS EVERY 8 HOURS
Status: COMPLETED | OUTPATIENT
Start: 2021-02-09 | End: 2021-02-10

## 2021-02-09 RX ORDER — CIPROFLOXACIN 2 MG/ML
400 INJECTION, SOLUTION INTRAVENOUS
Status: COMPLETED | OUTPATIENT
Start: 2021-02-09 | End: 2021-02-10

## 2021-02-09 RX ORDER — ACETAMINOPHEN 650 MG/20.3ML
975 LIQUID ORAL
Status: COMPLETED | OUTPATIENT
Start: 2021-02-09 | End: 2021-02-09

## 2021-02-09 RX ORDER — HYDROMORPHONE HYDROCHLORIDE 1 MG/ML
0.2 INJECTION, SOLUTION INTRAMUSCULAR; INTRAVENOUS; SUBCUTANEOUS EVERY 5 MIN PRN
Status: DISCONTINUED | OUTPATIENT
Start: 2021-02-09 | End: 2021-02-09 | Stop reason: HOSPADM

## 2021-02-09 RX ORDER — DEXAMETHASONE SODIUM PHOSPHATE 4 MG/ML
INJECTION, SOLUTION INTRA-ARTICULAR; INTRALESIONAL; INTRAMUSCULAR; INTRAVENOUS; SOFT TISSUE
Status: DISCONTINUED | OUTPATIENT
Start: 2021-02-09 | End: 2021-02-09

## 2021-02-09 RX ORDER — FENTANYL CITRATE 50 UG/ML
25 INJECTION, SOLUTION INTRAMUSCULAR; INTRAVENOUS EVERY 5 MIN PRN
Status: DISCONTINUED | OUTPATIENT
Start: 2021-02-09 | End: 2021-02-09 | Stop reason: HOSPADM

## 2021-02-09 RX ORDER — ALVIMOPAN 12 MG/1
12 CAPSULE ORAL ONCE
Status: COMPLETED | OUTPATIENT
Start: 2021-02-09 | End: 2021-02-09

## 2021-02-09 RX ORDER — ONDANSETRON 2 MG/ML
4 INJECTION INTRAMUSCULAR; INTRAVENOUS ONCE AS NEEDED
Status: DISCONTINUED | OUTPATIENT
Start: 2021-02-09 | End: 2021-02-09 | Stop reason: HOSPADM

## 2021-02-09 RX ORDER — MUPIROCIN 20 MG/G
1 OINTMENT TOPICAL
Status: COMPLETED | OUTPATIENT
Start: 2021-02-09 | End: 2021-02-09

## 2021-02-09 RX ORDER — ONDANSETRON 8 MG/1
8 TABLET, ORALLY DISINTEGRATING ORAL EVERY 8 HOURS PRN
Status: DISCONTINUED | OUTPATIENT
Start: 2021-02-09 | End: 2021-02-12 | Stop reason: HOSPADM

## 2021-02-09 RX ORDER — FENTANYL CITRATE 50 UG/ML
INJECTION, SOLUTION INTRAMUSCULAR; INTRAVENOUS
Status: DISCONTINUED | OUTPATIENT
Start: 2021-02-09 | End: 2021-02-09

## 2021-02-09 RX ORDER — HEPARIN SODIUM 5000 [USP'U]/ML
5000 INJECTION, SOLUTION INTRAVENOUS; SUBCUTANEOUS EVERY 8 HOURS
Status: DISCONTINUED | OUTPATIENT
Start: 2021-02-09 | End: 2021-02-12 | Stop reason: HOSPADM

## 2021-02-09 RX ORDER — BUPIVACAINE HYDROCHLORIDE AND EPINEPHRINE 2.5; 5 MG/ML; UG/ML
INJECTION, SOLUTION EPIDURAL; INFILTRATION; INTRACAUDAL; PERINEURAL
Status: DISCONTINUED | OUTPATIENT
Start: 2021-02-09 | End: 2021-02-09 | Stop reason: HOSPADM

## 2021-02-09 RX ORDER — GABAPENTIN 300 MG/1
300 CAPSULE ORAL 3 TIMES DAILY
Status: DISCONTINUED | OUTPATIENT
Start: 2021-02-09 | End: 2021-02-12 | Stop reason: HOSPADM

## 2021-02-09 RX ORDER — HEPARIN SODIUM 5000 [USP'U]/ML
5000 INJECTION, SOLUTION INTRAVENOUS; SUBCUTANEOUS EVERY 8 HOURS
Status: COMPLETED | OUTPATIENT
Start: 2021-02-09 | End: 2021-02-09

## 2021-02-09 RX ORDER — KETAMINE HCL IN 0.9 % NACL 50 MG/5 ML
SYRINGE (ML) INTRAVENOUS
Status: DISCONTINUED | OUTPATIENT
Start: 2021-02-09 | End: 2021-02-09

## 2021-02-09 RX ORDER — LIDOCAINE HYDROCHLORIDE 10 MG/ML
1 INJECTION, SOLUTION EPIDURAL; INFILTRATION; INTRACAUDAL; PERINEURAL
Status: DISCONTINUED | OUTPATIENT
Start: 2021-02-09 | End: 2021-02-09

## 2021-02-09 RX ORDER — OXYCODONE HYDROCHLORIDE 5 MG/1
5 TABLET ORAL EVERY 6 HOURS PRN
Status: DISCONTINUED | OUTPATIENT
Start: 2021-02-09 | End: 2021-02-12 | Stop reason: HOSPADM

## 2021-02-09 RX ORDER — GABAPENTIN 300 MG/1
300 CAPSULE ORAL 3 TIMES DAILY
Status: DISCONTINUED | OUTPATIENT
Start: 2021-02-09 | End: 2021-02-09

## 2021-02-09 RX ORDER — ROCURONIUM BROMIDE 10 MG/ML
INJECTION, SOLUTION INTRAVENOUS
Status: DISCONTINUED | OUTPATIENT
Start: 2021-02-09 | End: 2021-02-09

## 2021-02-09 RX ORDER — GABAPENTIN 300 MG/1
300 CAPSULE ORAL
Status: COMPLETED | OUTPATIENT
Start: 2021-02-09 | End: 2021-02-09

## 2021-02-09 RX ORDER — IBUPROFEN 200 MG
800 TABLET ORAL EVERY 8 HOURS
Status: DISCONTINUED | OUTPATIENT
Start: 2021-02-10 | End: 2021-02-09

## 2021-02-09 RX ORDER — SODIUM CHLORIDE 9 MG/ML
INJECTION, SOLUTION INTRAVENOUS
Status: COMPLETED | OUTPATIENT
Start: 2021-02-09 | End: 2021-02-09

## 2021-02-09 RX ORDER — ALVIMOPAN 12 MG/1
12 CAPSULE ORAL 2 TIMES DAILY
Status: DISCONTINUED | OUTPATIENT
Start: 2021-02-10 | End: 2021-02-12 | Stop reason: HOSPADM

## 2021-02-09 RX ORDER — PROPOFOL 10 MG/ML
VIAL (ML) INTRAVENOUS
Status: DISCONTINUED | OUTPATIENT
Start: 2021-02-09 | End: 2021-02-09

## 2021-02-09 RX ORDER — PHENYLEPHRINE HCL IN 0.9% NACL 1 MG/10 ML
SYRINGE (ML) INTRAVENOUS
Status: DISCONTINUED | OUTPATIENT
Start: 2021-02-09 | End: 2021-02-09

## 2021-02-09 RX ORDER — LIDOCAINE HYDROCHLORIDE 20 MG/ML
INJECTION INTRAVENOUS
Status: DISCONTINUED | OUTPATIENT
Start: 2021-02-09 | End: 2021-02-09

## 2021-02-09 RX ORDER — OXYCODONE HYDROCHLORIDE 10 MG/1
10 TABLET ORAL EVERY 4 HOURS PRN
Status: DISCONTINUED | OUTPATIENT
Start: 2021-02-09 | End: 2021-02-12 | Stop reason: HOSPADM

## 2021-02-09 RX ORDER — METRONIDAZOLE 500 MG/100ML
500 INJECTION, SOLUTION INTRAVENOUS
Status: COMPLETED | OUTPATIENT
Start: 2021-02-09 | End: 2021-02-09

## 2021-02-09 RX ORDER — ONDANSETRON 2 MG/ML
INJECTION INTRAMUSCULAR; INTRAVENOUS
Status: DISCONTINUED | OUTPATIENT
Start: 2021-02-09 | End: 2021-02-09

## 2021-02-09 RX ORDER — MUPIROCIN 20 MG/G
OINTMENT TOPICAL 2 TIMES DAILY
Status: DISCONTINUED | OUTPATIENT
Start: 2021-02-09 | End: 2021-02-12 | Stop reason: HOSPADM

## 2021-02-09 RX ORDER — TRAMADOL HYDROCHLORIDE 50 MG/1
50 TABLET ORAL EVERY 6 HOURS PRN
Status: DISCONTINUED | OUTPATIENT
Start: 2021-02-09 | End: 2021-02-12 | Stop reason: HOSPADM

## 2021-02-09 RX ORDER — METRONIDAZOLE 500 MG/100ML
500 INJECTION, SOLUTION INTRAVENOUS
Status: COMPLETED | OUTPATIENT
Start: 2021-02-09 | End: 2021-02-10

## 2021-02-09 RX ADMIN — DEXAMETHASONE SODIUM PHOSPHATE 8 MG: 4 INJECTION, SOLUTION INTRAMUSCULAR; INTRAVENOUS at 10:02

## 2021-02-09 RX ADMIN — HEPARIN SODIUM 5000 UNITS: 5000 INJECTION INTRAVENOUS; SUBCUTANEOUS at 09:02

## 2021-02-09 RX ADMIN — OXYCODONE HYDROCHLORIDE 10 MG: 10 TABLET ORAL at 10:02

## 2021-02-09 RX ADMIN — LIDOCAINE HYDROCHLORIDE 100 MG: 20 INJECTION, SOLUTION INTRAVENOUS at 10:02

## 2021-02-09 RX ADMIN — ONDANSETRON 8 MG: 8 TABLET, ORALLY DISINTEGRATING ORAL at 06:02

## 2021-02-09 RX ADMIN — GABAPENTIN 300 MG: 300 CAPSULE ORAL at 09:02

## 2021-02-09 RX ADMIN — MIDAZOLAM HYDROCHLORIDE 2 MG: 1 INJECTION, SOLUTION INTRAMUSCULAR; INTRAVENOUS at 09:02

## 2021-02-09 RX ADMIN — CIPROFLOXACIN 400 MG: 2 INJECTION, SOLUTION INTRAVENOUS at 02:02

## 2021-02-09 RX ADMIN — SODIUM CHLORIDE: 0.9 INJECTION, SOLUTION INTRAVENOUS at 09:02

## 2021-02-09 RX ADMIN — PROPOFOL 150 MG: 10 INJECTION, EMULSION INTRAVENOUS at 10:02

## 2021-02-09 RX ADMIN — IBUPROFEN 600 MG: 100 SUSPENSION ORAL at 09:02

## 2021-02-09 RX ADMIN — ONDANSETRON 4 MG: 2 INJECTION INTRAMUSCULAR; INTRAVENOUS at 12:02

## 2021-02-09 RX ADMIN — OXYCODONE HYDROCHLORIDE 10 MG: 10 TABLET ORAL at 02:02

## 2021-02-09 RX ADMIN — Medication 200 MCG: at 10:02

## 2021-02-09 RX ADMIN — Medication 20 MG: at 10:02

## 2021-02-09 RX ADMIN — GABAPENTIN 300 MG: 300 CAPSULE ORAL at 02:02

## 2021-02-09 RX ADMIN — Medication 10 MG: at 12:02

## 2021-02-09 RX ADMIN — METRONIDAZOLE 500 MG: 500 INJECTION, SOLUTION INTRAVENOUS at 05:02

## 2021-02-09 RX ADMIN — MUPIROCIN: 20 OINTMENT TOPICAL at 09:02

## 2021-02-09 RX ADMIN — FENTANYL CITRATE 50 MCG: 50 INJECTION, SOLUTION INTRAMUSCULAR; INTRAVENOUS at 10:02

## 2021-02-09 RX ADMIN — MUPIROCIN 1 G: 20 OINTMENT TOPICAL at 09:02

## 2021-02-09 RX ADMIN — Medication 100 MCG: at 10:02

## 2021-02-09 RX ADMIN — METRONIDAZOLE 500 MG: 500 SOLUTION INTRAVENOUS at 10:02

## 2021-02-09 RX ADMIN — SODIUM CHLORIDE 0.3 MCG/KG/MIN: 9 INJECTION, SOLUTION INTRAVENOUS at 10:02

## 2021-02-09 RX ADMIN — ACETAMINOPHEN 1000 MG: 10 INJECTION, SOLUTION INTRAVENOUS at 09:02

## 2021-02-09 RX ADMIN — ACETAMINOPHEN 1000 MG: 10 INJECTION, SOLUTION INTRAVENOUS at 05:02

## 2021-02-09 RX ADMIN — CEFTRIAXONE SODIUM 2 G: 2 INJECTION, SOLUTION INTRAVENOUS at 10:02

## 2021-02-09 RX ADMIN — ACETAMINOPHEN 976.6 MG: 160 SOLUTION ORAL at 09:02

## 2021-02-09 RX ADMIN — LIDOCAINE HYDROCHLORIDE 0.02 MG/KG/MIN: 8 INJECTION, SOLUTION INTRAVENOUS at 10:02

## 2021-02-09 RX ADMIN — Medication 10 MG: at 11:02

## 2021-02-09 RX ADMIN — SUGAMMADEX 200 MG: 100 INJECTION, SOLUTION INTRAVENOUS at 01:02

## 2021-02-09 RX ADMIN — ROCURONIUM BROMIDE 20 MG: 10 INJECTION, SOLUTION INTRAVENOUS at 12:02

## 2021-02-09 RX ADMIN — ROCURONIUM BROMIDE 50 MG: 10 INJECTION, SOLUTION INTRAVENOUS at 10:02

## 2021-02-09 RX ADMIN — ALVIMOPAN 12 MG: 12 CAPSULE ORAL at 09:02

## 2021-02-10 LAB
ANION GAP SERPL CALC-SCNC: 13 MMOL/L (ref 8–16)
ANION GAP SERPL CALC-SCNC: 9 MMOL/L (ref 8–16)
BASOPHILS # BLD AUTO: 0.02 K/UL (ref 0–0.2)
BASOPHILS NFR BLD: 0.1 % (ref 0–1.9)
BUN SERPL-MCNC: 24 MG/DL (ref 8–23)
BUN SERPL-MCNC: 28 MG/DL (ref 8–23)
CALCIUM SERPL-MCNC: 8.5 MG/DL (ref 8.7–10.5)
CALCIUM SERPL-MCNC: 8.7 MG/DL (ref 8.7–10.5)
CHLORIDE SERPL-SCNC: 111 MMOL/L (ref 95–110)
CHLORIDE SERPL-SCNC: 112 MMOL/L (ref 95–110)
CO2 SERPL-SCNC: 18 MMOL/L (ref 23–29)
CO2 SERPL-SCNC: 9 MMOL/L (ref 23–29)
CREAT SERPL-MCNC: 1.4 MG/DL (ref 0.5–1.4)
CREAT SERPL-MCNC: 1.6 MG/DL (ref 0.5–1.4)
DIFFERENTIAL METHOD: ABNORMAL
EOSINOPHIL # BLD AUTO: 0 K/UL (ref 0–0.5)
EOSINOPHIL NFR BLD: 0 % (ref 0–8)
ERYTHROCYTE [DISTWIDTH] IN BLOOD BY AUTOMATED COUNT: 13.2 % (ref 11.5–14.5)
EST. GFR  (AFRICAN AMERICAN): 37.6 ML/MIN/1.73 M^2
EST. GFR  (AFRICAN AMERICAN): 44.2 ML/MIN/1.73 M^2
EST. GFR  (NON AFRICAN AMERICAN): 32.6 ML/MIN/1.73 M^2
EST. GFR  (NON AFRICAN AMERICAN): 38.4 ML/MIN/1.73 M^2
GLUCOSE SERPL-MCNC: 163 MG/DL (ref 70–110)
GLUCOSE SERPL-MCNC: 172 MG/DL (ref 70–110)
HCT VFR BLD AUTO: 35.4 % (ref 37–48.5)
HGB BLD-MCNC: 11.5 G/DL (ref 12–16)
IMM GRANULOCYTES # BLD AUTO: 0.08 K/UL (ref 0–0.04)
IMM GRANULOCYTES NFR BLD AUTO: 0.4 % (ref 0–0.5)
LYMPHOCYTES # BLD AUTO: 0.9 K/UL (ref 1–4.8)
LYMPHOCYTES NFR BLD: 4.6 % (ref 18–48)
MAGNESIUM SERPL-MCNC: 1.5 MG/DL (ref 1.6–2.6)
MCH RBC QN AUTO: 29.5 PG (ref 27–31)
MCHC RBC AUTO-ENTMCNC: 32.5 G/DL (ref 32–36)
MCV RBC AUTO: 91 FL (ref 82–98)
MONOCYTES # BLD AUTO: 0.8 K/UL (ref 0.3–1)
MONOCYTES NFR BLD: 4 % (ref 4–15)
NEUTROPHILS # BLD AUTO: 17.7 K/UL (ref 1.8–7.7)
NEUTROPHILS NFR BLD: 90.9 % (ref 38–73)
NRBC BLD-RTO: 0 /100 WBC
PHOSPHATE SERPL-MCNC: 3.4 MG/DL (ref 2.7–4.5)
PLATELET # BLD AUTO: 230 K/UL (ref 150–350)
PMV BLD AUTO: 9.3 FL (ref 9.2–12.9)
POTASSIUM SERPL-SCNC: 3.6 MMOL/L (ref 3.5–5.1)
POTASSIUM SERPL-SCNC: 3.9 MMOL/L (ref 3.5–5.1)
RBC # BLD AUTO: 3.9 M/UL (ref 4–5.4)
SODIUM SERPL-SCNC: 134 MMOL/L (ref 136–145)
SODIUM SERPL-SCNC: 138 MMOL/L (ref 136–145)
WBC # BLD AUTO: 19.46 K/UL (ref 3.9–12.7)

## 2021-02-10 PROCEDURE — 97165 OT EVAL LOW COMPLEX 30 MIN: CPT

## 2021-02-10 PROCEDURE — S0030 INJECTION, METRONIDAZOLE: HCPCS | Performed by: STUDENT IN AN ORGANIZED HEALTH CARE EDUCATION/TRAINING PROGRAM

## 2021-02-10 PROCEDURE — 25000003 PHARM REV CODE 250: Performed by: STUDENT IN AN ORGANIZED HEALTH CARE EDUCATION/TRAINING PROGRAM

## 2021-02-10 PROCEDURE — 80048 BASIC METABOLIC PNL TOTAL CA: CPT | Mod: 91

## 2021-02-10 PROCEDURE — 97535 SELF CARE MNGMENT TRAINING: CPT

## 2021-02-10 PROCEDURE — 25000003 PHARM REV CODE 250: Performed by: COLON & RECTAL SURGERY

## 2021-02-10 PROCEDURE — 83735 ASSAY OF MAGNESIUM: CPT

## 2021-02-10 PROCEDURE — 63600175 PHARM REV CODE 636 W HCPCS: Performed by: STUDENT IN AN ORGANIZED HEALTH CARE EDUCATION/TRAINING PROGRAM

## 2021-02-10 PROCEDURE — 20600001 HC STEP DOWN PRIVATE ROOM

## 2021-02-10 PROCEDURE — 36415 COLL VENOUS BLD VENIPUNCTURE: CPT

## 2021-02-10 PROCEDURE — 85025 COMPLETE CBC W/AUTO DIFF WBC: CPT

## 2021-02-10 PROCEDURE — 97161 PT EVAL LOW COMPLEX 20 MIN: CPT

## 2021-02-10 PROCEDURE — 84100 ASSAY OF PHOSPHORUS: CPT

## 2021-02-10 PROCEDURE — 97116 GAIT TRAINING THERAPY: CPT

## 2021-02-10 PROCEDURE — 25000003 PHARM REV CODE 250: Performed by: NURSE PRACTITIONER

## 2021-02-10 PROCEDURE — 80048 BASIC METABOLIC PNL TOTAL CA: CPT

## 2021-02-10 RX ORDER — INDOMETHACIN 25 MG/1
50 CAPSULE ORAL ONCE
Status: COMPLETED | OUTPATIENT
Start: 2021-02-10 | End: 2021-02-10

## 2021-02-10 RX ORDER — MAGNESIUM SULFATE HEPTAHYDRATE 40 MG/ML
2 INJECTION, SOLUTION INTRAVENOUS ONCE
Status: COMPLETED | OUTPATIENT
Start: 2021-02-10 | End: 2021-02-10

## 2021-02-10 RX ORDER — TALC
6 POWDER (GRAM) TOPICAL NIGHTLY
Status: DISCONTINUED | OUTPATIENT
Start: 2021-02-10 | End: 2021-02-10

## 2021-02-10 RX ORDER — TALC
6 POWDER (GRAM) TOPICAL NIGHTLY
Status: DISCONTINUED | OUTPATIENT
Start: 2021-02-10 | End: 2021-02-12 | Stop reason: HOSPADM

## 2021-02-10 RX ADMIN — HEPARIN SODIUM 5000 UNITS: 5000 INJECTION INTRAVENOUS; SUBCUTANEOUS at 10:02

## 2021-02-10 RX ADMIN — SODIUM BICARBONATE 50 MEQ: 84 INJECTION, SOLUTION INTRAVENOUS at 10:02

## 2021-02-10 RX ADMIN — METRONIDAZOLE 500 MG: 500 INJECTION, SOLUTION INTRAVENOUS at 09:02

## 2021-02-10 RX ADMIN — MUPIROCIN: 20 OINTMENT TOPICAL at 08:02

## 2021-02-10 RX ADMIN — SODIUM CHLORIDE: 0.9 INJECTION, SOLUTION INTRAVENOUS at 08:02

## 2021-02-10 RX ADMIN — OXYCODONE HYDROCHLORIDE 10 MG: 10 TABLET ORAL at 10:02

## 2021-02-10 RX ADMIN — GABAPENTIN 300 MG: 300 CAPSULE ORAL at 03:02

## 2021-02-10 RX ADMIN — ALVIMOPAN 12 MG: 12 CAPSULE ORAL at 08:02

## 2021-02-10 RX ADMIN — OXYCODONE HYDROCHLORIDE 10 MG: 10 TABLET ORAL at 03:02

## 2021-02-10 RX ADMIN — MAGNESIUM SULFATE 2 G: 2 INJECTION INTRAVENOUS at 10:02

## 2021-02-10 RX ADMIN — MELATONIN TAB 3 MG 6 MG: 3 TAB at 08:02

## 2021-02-10 RX ADMIN — HEPARIN SODIUM 5000 UNITS: 5000 INJECTION INTRAVENOUS; SUBCUTANEOUS at 03:02

## 2021-02-10 RX ADMIN — ACETAMINOPHEN 1000 MG: 500 TABLET ORAL at 03:02

## 2021-02-10 RX ADMIN — ACETAMINOPHEN 1000 MG: 10 INJECTION, SOLUTION INTRAVENOUS at 06:02

## 2021-02-10 RX ADMIN — GABAPENTIN 300 MG: 300 CAPSULE ORAL at 08:02

## 2021-02-10 RX ADMIN — ACETAMINOPHEN 1000 MG: 500 TABLET ORAL at 10:02

## 2021-02-10 RX ADMIN — METRONIDAZOLE 500 MG: 500 INJECTION, SOLUTION INTRAVENOUS at 01:02

## 2021-02-10 RX ADMIN — SODIUM CHLORIDE 500 ML: 0.9 INJECTION, SOLUTION INTRAVENOUS at 10:02

## 2021-02-10 RX ADMIN — HEPARIN SODIUM 5000 UNITS: 5000 INJECTION INTRAVENOUS; SUBCUTANEOUS at 05:02

## 2021-02-10 RX ADMIN — ONDANSETRON 8 MG: 8 TABLET, ORALLY DISINTEGRATING ORAL at 08:02

## 2021-02-10 RX ADMIN — OXYCODONE HYDROCHLORIDE 10 MG: 10 TABLET ORAL at 08:02

## 2021-02-10 RX ADMIN — CIPROFLOXACIN 400 MG: 2 INJECTION, SOLUTION INTRAVENOUS at 02:02

## 2021-02-11 LAB
ANION GAP SERPL CALC-SCNC: 10 MMOL/L (ref 8–16)
BUN SERPL-MCNC: 25 MG/DL (ref 8–23)
CALCIUM SERPL-MCNC: 8 MG/DL (ref 8.7–10.5)
CHLORIDE SERPL-SCNC: 113 MMOL/L (ref 95–110)
CO2 SERPL-SCNC: 16 MMOL/L (ref 23–29)
CREAT SERPL-MCNC: 1.2 MG/DL (ref 0.5–1.4)
EST. GFR  (AFRICAN AMERICAN): 53.3 ML/MIN/1.73 M^2
EST. GFR  (NON AFRICAN AMERICAN): 46.2 ML/MIN/1.73 M^2
FINAL PATHOLOGIC DIAGNOSIS: NORMAL
GLUCOSE SERPL-MCNC: 113 MG/DL (ref 70–110)
GROSS: NORMAL
Lab: NORMAL
POTASSIUM SERPL-SCNC: 3.7 MMOL/L (ref 3.5–5.1)
SODIUM SERPL-SCNC: 139 MMOL/L (ref 136–145)

## 2021-02-11 PROCEDURE — 63600175 PHARM REV CODE 636 W HCPCS: Performed by: STUDENT IN AN ORGANIZED HEALTH CARE EDUCATION/TRAINING PROGRAM

## 2021-02-11 PROCEDURE — 36415 COLL VENOUS BLD VENIPUNCTURE: CPT

## 2021-02-11 PROCEDURE — 25000003 PHARM REV CODE 250: Performed by: NURSE PRACTITIONER

## 2021-02-11 PROCEDURE — 20600001 HC STEP DOWN PRIVATE ROOM

## 2021-02-11 PROCEDURE — 25000003 PHARM REV CODE 250: Performed by: COLON & RECTAL SURGERY

## 2021-02-11 PROCEDURE — 25000242 PHARM REV CODE 250 ALT 637 W/ HCPCS: Performed by: COLON & RECTAL SURGERY

## 2021-02-11 PROCEDURE — 25000003 PHARM REV CODE 250: Performed by: STUDENT IN AN ORGANIZED HEALTH CARE EDUCATION/TRAINING PROGRAM

## 2021-02-11 PROCEDURE — 80048 BASIC METABOLIC PNL TOTAL CA: CPT

## 2021-02-11 RX ORDER — ZINC OXIDE 20 G/100G
OINTMENT TOPICAL
Status: DISCONTINUED | OUTPATIENT
Start: 2021-02-11 | End: 2021-02-12 | Stop reason: HOSPADM

## 2021-02-11 RX ADMIN — ACETAMINOPHEN 1000 MG: 500 TABLET ORAL at 05:02

## 2021-02-11 RX ADMIN — OXYCODONE HYDROCHLORIDE 10 MG: 10 TABLET ORAL at 01:02

## 2021-02-11 RX ADMIN — HEPARIN SODIUM 5000 UNITS: 5000 INJECTION INTRAVENOUS; SUBCUTANEOUS at 01:02

## 2021-02-11 RX ADMIN — PSYLLIUM HUSK 1 PACKET: 3.4 POWDER ORAL at 05:02

## 2021-02-11 RX ADMIN — ACETAMINOPHEN 1000 MG: 500 TABLET ORAL at 01:02

## 2021-02-11 RX ADMIN — GABAPENTIN 300 MG: 300 CAPSULE ORAL at 03:02

## 2021-02-11 RX ADMIN — GABAPENTIN 300 MG: 300 CAPSULE ORAL at 08:02

## 2021-02-11 RX ADMIN — MUPIROCIN: 20 OINTMENT TOPICAL at 09:02

## 2021-02-11 RX ADMIN — OXYCODONE HYDROCHLORIDE 10 MG: 10 TABLET ORAL at 05:02

## 2021-02-11 RX ADMIN — MUPIROCIN: 20 OINTMENT TOPICAL at 08:02

## 2021-02-11 RX ADMIN — SODIUM CHLORIDE: 0.9 INJECTION, SOLUTION INTRAVENOUS at 09:02

## 2021-02-11 RX ADMIN — GABAPENTIN 300 MG: 300 CAPSULE ORAL at 09:02

## 2021-02-11 RX ADMIN — OXYCODONE HYDROCHLORIDE 10 MG: 10 TABLET ORAL at 08:02

## 2021-02-11 RX ADMIN — HEPARIN SODIUM 5000 UNITS: 5000 INJECTION INTRAVENOUS; SUBCUTANEOUS at 05:02

## 2021-02-11 RX ADMIN — HEPARIN SODIUM 5000 UNITS: 5000 INJECTION INTRAVENOUS; SUBCUTANEOUS at 09:02

## 2021-02-11 RX ADMIN — ALVIMOPAN 12 MG: 12 CAPSULE ORAL at 09:02

## 2021-02-11 RX ADMIN — MELATONIN TAB 3 MG 6 MG: 3 TAB at 09:02

## 2021-02-11 RX ADMIN — ALVIMOPAN 12 MG: 12 CAPSULE ORAL at 08:02

## 2021-02-11 RX ADMIN — SODIUM CHLORIDE: 0.9 INJECTION, SOLUTION INTRAVENOUS at 05:02

## 2021-02-11 RX ADMIN — OXYCODONE HYDROCHLORIDE 10 MG: 10 TABLET ORAL at 09:02

## 2021-02-12 VITALS
BODY MASS INDEX: 27.18 KG/M2 | SYSTOLIC BLOOD PRESSURE: 147 MMHG | DIASTOLIC BLOOD PRESSURE: 83 MMHG | WEIGHT: 147.69 LBS | RESPIRATION RATE: 16 BRPM | HEART RATE: 98 BPM | OXYGEN SATURATION: 98 % | HEIGHT: 62 IN | TEMPERATURE: 98 F

## 2021-02-12 LAB
ANION GAP SERPL CALC-SCNC: 7 MMOL/L (ref 8–16)
BUN SERPL-MCNC: 13 MG/DL (ref 8–23)
CALCIUM SERPL-MCNC: 7.9 MG/DL (ref 8.7–10.5)
CHLORIDE SERPL-SCNC: 110 MMOL/L (ref 95–110)
CO2 SERPL-SCNC: 22 MMOL/L (ref 23–29)
CREAT SERPL-MCNC: 1 MG/DL (ref 0.5–1.4)
CRP SERPL-MCNC: 164.7 MG/L (ref 0–8.2)
EST. GFR  (AFRICAN AMERICAN): >60 ML/MIN/1.73 M^2
EST. GFR  (NON AFRICAN AMERICAN): 57.6 ML/MIN/1.73 M^2
GLUCOSE SERPL-MCNC: 158 MG/DL (ref 70–110)
POTASSIUM SERPL-SCNC: 3.1 MMOL/L (ref 3.5–5.1)
SODIUM SERPL-SCNC: 139 MMOL/L (ref 136–145)

## 2021-02-12 PROCEDURE — 25000003 PHARM REV CODE 250: Performed by: STUDENT IN AN ORGANIZED HEALTH CARE EDUCATION/TRAINING PROGRAM

## 2021-02-12 PROCEDURE — 97535 SELF CARE MNGMENT TRAINING: CPT

## 2021-02-12 PROCEDURE — 25000242 PHARM REV CODE 250 ALT 637 W/ HCPCS: Performed by: COLON & RECTAL SURGERY

## 2021-02-12 PROCEDURE — 80048 BASIC METABOLIC PNL TOTAL CA: CPT

## 2021-02-12 PROCEDURE — 63600175 PHARM REV CODE 636 W HCPCS: Performed by: STUDENT IN AN ORGANIZED HEALTH CARE EDUCATION/TRAINING PROGRAM

## 2021-02-12 PROCEDURE — 36415 COLL VENOUS BLD VENIPUNCTURE: CPT

## 2021-02-12 PROCEDURE — 86140 C-REACTIVE PROTEIN: CPT

## 2021-02-12 PROCEDURE — 94761 N-INVAS EAR/PLS OXIMETRY MLT: CPT

## 2021-02-12 PROCEDURE — 25000003 PHARM REV CODE 250: Performed by: NURSE PRACTITIONER

## 2021-02-12 RX ORDER — ACETAMINOPHEN 500 MG
500 TABLET ORAL EVERY 8 HOURS
Qty: 15 TABLET | Refills: 0 | Status: SHIPPED | OUTPATIENT
Start: 2021-02-12 | End: 2021-02-17

## 2021-02-12 RX ORDER — OXYCODONE HYDROCHLORIDE 5 MG/1
5 TABLET ORAL EVERY 4 HOURS PRN
Qty: 30 TABLET | Refills: 0 | Status: SHIPPED | OUTPATIENT
Start: 2021-02-12 | End: 2021-03-03 | Stop reason: SDUPTHER

## 2021-02-12 RX ORDER — ZINC OXIDE 20 G/100G
OINTMENT TOPICAL
Refills: 0 | COMMUNITY
Start: 2021-02-12 | End: 2021-11-18

## 2021-02-12 RX ADMIN — ACETAMINOPHEN 1000 MG: 500 TABLET ORAL at 05:02

## 2021-02-12 RX ADMIN — ALVIMOPAN 12 MG: 12 CAPSULE ORAL at 09:02

## 2021-02-12 RX ADMIN — GABAPENTIN 300 MG: 300 CAPSULE ORAL at 09:02

## 2021-02-12 RX ADMIN — OXYCODONE HYDROCHLORIDE 10 MG: 10 TABLET ORAL at 05:02

## 2021-02-12 RX ADMIN — HEPARIN SODIUM 5000 UNITS: 5000 INJECTION INTRAVENOUS; SUBCUTANEOUS at 05:02

## 2021-02-12 RX ADMIN — MUPIROCIN: 20 OINTMENT TOPICAL at 09:02

## 2021-02-12 RX ADMIN — PSYLLIUM HUSK 1 PACKET: 3.4 POWDER ORAL at 09:02

## 2021-02-13 PROCEDURE — G0180 PR HOME HEALTH MD CERTIFICATION: ICD-10-PCS | Mod: ,,, | Performed by: COLON & RECTAL SURGERY

## 2021-02-13 PROCEDURE — G0180 MD CERTIFICATION HHA PATIENT: HCPCS | Mod: ,,, | Performed by: COLON & RECTAL SURGERY

## 2021-02-23 ENCOUNTER — EXTERNAL HOME HEALTH (OUTPATIENT)
Dept: HOME HEALTH SERVICES | Facility: HOSPITAL | Age: 71
End: 2021-02-23
Payer: MEDICARE

## 2021-02-23 ENCOUNTER — TELEPHONE (OUTPATIENT)
Dept: SURGERY | Facility: CLINIC | Age: 71
End: 2021-02-23

## 2021-02-26 ENCOUNTER — TELEPHONE (OUTPATIENT)
Dept: SURGERY | Facility: CLINIC | Age: 71
End: 2021-02-26

## 2021-03-03 ENCOUNTER — OFFICE VISIT (OUTPATIENT)
Dept: SURGERY | Facility: CLINIC | Age: 71
End: 2021-03-03
Attending: COLON & RECTAL SURGERY
Payer: MEDICARE

## 2021-03-03 ENCOUNTER — LAB VISIT (OUTPATIENT)
Dept: LAB | Facility: HOSPITAL | Age: 71
End: 2021-03-03
Attending: COLON & RECTAL SURGERY
Payer: MEDICARE

## 2021-03-03 VITALS
DIASTOLIC BLOOD PRESSURE: 82 MMHG | SYSTOLIC BLOOD PRESSURE: 144 MMHG | BODY MASS INDEX: 27.47 KG/M2 | HEIGHT: 62 IN | HEART RATE: 97 BPM | WEIGHT: 149.25 LBS

## 2021-03-03 DIAGNOSIS — R19.7 DIARRHEA, UNSPECIFIED TYPE: ICD-10-CM

## 2021-03-03 DIAGNOSIS — R19.7 DIARRHEA, UNSPECIFIED TYPE: Primary | ICD-10-CM

## 2021-03-03 DIAGNOSIS — R10.84 GENERALIZED ABDOMINAL PAIN: ICD-10-CM

## 2021-03-03 PROCEDURE — 1101F PR PT FALLS ASSESS DOC 0-1 FALLS W/OUT INJ PAST YR: ICD-10-PCS | Mod: CPTII,S$GLB,, | Performed by: COLON & RECTAL SURGERY

## 2021-03-03 PROCEDURE — 3008F BODY MASS INDEX DOCD: CPT | Mod: CPTII,S$GLB,, | Performed by: COLON & RECTAL SURGERY

## 2021-03-03 PROCEDURE — 3008F PR BODY MASS INDEX (BMI) DOCUMENTED: ICD-10-PCS | Mod: CPTII,S$GLB,, | Performed by: COLON & RECTAL SURGERY

## 2021-03-03 PROCEDURE — 1125F PR PAIN SEVERITY QUANTIFIED, PAIN PRESENT: ICD-10-PCS | Mod: S$GLB,,, | Performed by: COLON & RECTAL SURGERY

## 2021-03-03 PROCEDURE — 1101F PT FALLS ASSESS-DOCD LE1/YR: CPT | Mod: CPTII,S$GLB,, | Performed by: COLON & RECTAL SURGERY

## 2021-03-03 PROCEDURE — 99999 PR PBB SHADOW E&M-EST. PATIENT-LVL IV: ICD-10-PCS | Mod: PBBFAC,,, | Performed by: COLON & RECTAL SURGERY

## 2021-03-03 PROCEDURE — 3288F PR FALLS RISK ASSESSMENT DOCUMENTED: ICD-10-PCS | Mod: CPTII,S$GLB,, | Performed by: COLON & RECTAL SURGERY

## 2021-03-03 PROCEDURE — 87209 SMEAR COMPLEX STAIN: CPT | Performed by: COLON & RECTAL SURGERY

## 2021-03-03 PROCEDURE — 99999 PR PBB SHADOW E&M-EST. PATIENT-LVL IV: CPT | Mod: PBBFAC,,, | Performed by: COLON & RECTAL SURGERY

## 2021-03-03 PROCEDURE — 99024 POSTOP FOLLOW-UP VISIT: CPT | Mod: S$GLB,,, | Performed by: COLON & RECTAL SURGERY

## 2021-03-03 PROCEDURE — 99024 PR POST-OP FOLLOW-UP VISIT: ICD-10-PCS | Mod: S$GLB,,, | Performed by: COLON & RECTAL SURGERY

## 2021-03-03 PROCEDURE — 1125F AMNT PAIN NOTED PAIN PRSNT: CPT | Mod: S$GLB,,, | Performed by: COLON & RECTAL SURGERY

## 2021-03-03 PROCEDURE — 3288F FALL RISK ASSESSMENT DOCD: CPT | Mod: CPTII,S$GLB,, | Performed by: COLON & RECTAL SURGERY

## 2021-03-03 RX ORDER — OXYCODONE HYDROCHLORIDE 5 MG/1
5 TABLET ORAL EVERY 4 HOURS PRN
Qty: 30 TABLET | Refills: 0 | Status: SHIPPED | OUTPATIENT
Start: 2021-03-03 | End: 2021-07-16

## 2021-03-03 RX ORDER — CHOLESTYRAMINE LIGHT 4 G/5.7G
1 POWDER, FOR SUSPENSION ORAL 2 TIMES DAILY
Qty: 180 PACKET | Refills: 3 | Status: SHIPPED | OUTPATIENT
Start: 2021-03-03 | End: 2021-07-16

## 2021-03-05 ENCOUNTER — IMMUNIZATION (OUTPATIENT)
Dept: PRIMARY CARE CLINIC | Facility: CLINIC | Age: 71
End: 2021-03-05
Payer: MEDICARE

## 2021-03-05 DIAGNOSIS — Z23 NEED FOR VACCINATION: Primary | ICD-10-CM

## 2021-03-05 LAB — O+P STL MICRO: NORMAL

## 2021-03-05 PROCEDURE — 0031A PR IMMUNIZ ADMIN, SARS-COV-2 COVID-19 VACC, 5X10VP/0.5ML: CPT | Mod: CV19,S$GLB,, | Performed by: INTERNAL MEDICINE

## 2021-03-05 PROCEDURE — 91303 PR SARSCOV2 VAC AD26 .5ML IM: CPT | Mod: S$GLB,,, | Performed by: INTERNAL MEDICINE

## 2021-03-05 PROCEDURE — 91303 PR SARSCOV2 VAC AD26 .5ML IM: ICD-10-PCS | Mod: S$GLB,,, | Performed by: INTERNAL MEDICINE

## 2021-03-05 PROCEDURE — 0031A PR IMMUNIZ ADMIN, SARS-COV-2 COVID-19 VACC, 5X10VP/0.5ML: ICD-10-PCS | Mod: CV19,S$GLB,, | Performed by: INTERNAL MEDICINE

## 2021-03-08 ENCOUNTER — TELEPHONE (OUTPATIENT)
Dept: SURGERY | Facility: CLINIC | Age: 71
End: 2021-03-08

## 2021-03-10 ENCOUNTER — HOSPITAL ENCOUNTER (OUTPATIENT)
Dept: RADIOLOGY | Facility: HOSPITAL | Age: 71
Discharge: HOME | End: 2021-03-10
Attending: COLON & RECTAL SURGERY
Payer: MEDICARE

## 2021-03-10 DIAGNOSIS — R10.84 GENERALIZED ABDOMINAL PAIN: ICD-10-CM

## 2021-03-10 PROCEDURE — 74178 CT ABD&PLV WO CNTR FLWD CNTR: CPT | Mod: TC

## 2021-03-10 PROCEDURE — 25500020 PHARM REV CODE 255: Performed by: COLON & RECTAL SURGERY

## 2021-03-10 PROCEDURE — 74178 CT ABD&PLV WO CNTR FLWD CNTR: CPT | Mod: 26,,, | Performed by: INTERNAL MEDICINE

## 2021-03-10 PROCEDURE — A9698 NON-RAD CONTRAST MATERIALNOC: HCPCS | Performed by: COLON & RECTAL SURGERY

## 2021-03-10 PROCEDURE — 74178 CT ABDOMEN PELVIS W WO CONTRAST: ICD-10-PCS | Mod: 26,,, | Performed by: INTERNAL MEDICINE

## 2021-03-10 RX ADMIN — IOHEXOL 1000 ML: 9 SOLUTION ORAL at 02:03

## 2021-03-10 RX ADMIN — IOHEXOL 75 ML: 350 INJECTION, SOLUTION INTRAVENOUS at 03:03

## 2021-03-17 ENCOUNTER — TELEPHONE (OUTPATIENT)
Dept: SURGERY | Facility: CLINIC | Age: 71
End: 2021-03-17

## 2021-03-17 RX ORDER — METRONIDAZOLE 500 MG/1
500 TABLET ORAL EVERY 8 HOURS
Qty: 30 TABLET | Refills: 0 | Status: SHIPPED | OUTPATIENT
Start: 2021-03-17 | End: 2021-03-26 | Stop reason: SDUPTHER

## 2021-03-17 RX ORDER — CIPROFLOXACIN 500 MG/1
500 TABLET ORAL 2 TIMES DAILY
Qty: 20 TABLET | Refills: 0 | Status: SHIPPED | OUTPATIENT
Start: 2021-03-17 | End: 2021-03-26 | Stop reason: SDUPTHER

## 2021-03-26 ENCOUNTER — OFFICE VISIT (OUTPATIENT)
Dept: SURGERY | Facility: CLINIC | Age: 71
End: 2021-03-26
Attending: COLON & RECTAL SURGERY
Payer: MEDICARE

## 2021-03-26 ENCOUNTER — TELEPHONE (OUTPATIENT)
Dept: SURGERY | Facility: CLINIC | Age: 71
End: 2021-03-26

## 2021-03-26 ENCOUNTER — LAB VISIT (OUTPATIENT)
Dept: LAB | Facility: HOSPITAL | Age: 71
End: 2021-03-26
Attending: COLON & RECTAL SURGERY
Payer: MEDICARE

## 2021-03-26 VITALS
BODY MASS INDEX: 24.49 KG/M2 | HEIGHT: 65 IN | SYSTOLIC BLOOD PRESSURE: 170 MMHG | DIASTOLIC BLOOD PRESSURE: 90 MMHG | HEART RATE: 88 BPM | WEIGHT: 147 LBS

## 2021-03-26 DIAGNOSIS — R10.84 GENERALIZED ABDOMINAL PAIN: ICD-10-CM

## 2021-03-26 DIAGNOSIS — Z98.890 POST-OPERATIVE STATE: ICD-10-CM

## 2021-03-26 DIAGNOSIS — R10.84 GENERALIZED ABDOMINAL PAIN: Primary | ICD-10-CM

## 2021-03-26 LAB
ALBUMIN SERPL BCP-MCNC: 3.2 G/DL (ref 3.5–5.2)
ALP SERPL-CCNC: 90 U/L (ref 55–135)
ALT SERPL W/O P-5'-P-CCNC: 10 U/L (ref 10–44)
ANION GAP SERPL CALC-SCNC: 10 MMOL/L (ref 8–16)
AST SERPL-CCNC: 12 U/L (ref 10–40)
BASOPHILS # BLD AUTO: 0.03 K/UL (ref 0–0.2)
BASOPHILS NFR BLD: 0.2 % (ref 0–1.9)
BILIRUB SERPL-MCNC: 0.6 MG/DL (ref 0.1–1)
BUN SERPL-MCNC: 21 MG/DL (ref 8–23)
CALCIUM SERPL-MCNC: 10 MG/DL (ref 8.7–10.5)
CHLORIDE SERPL-SCNC: 99 MMOL/L (ref 95–110)
CO2 SERPL-SCNC: 26 MMOL/L (ref 23–29)
CREAT SERPL-MCNC: 1.3 MG/DL (ref 0.5–1.4)
CRP SERPL-MCNC: 158.3 MG/L (ref 0–8.2)
DIFFERENTIAL METHOD: ABNORMAL
EOSINOPHIL # BLD AUTO: 0 K/UL (ref 0–0.5)
EOSINOPHIL NFR BLD: 0.1 % (ref 0–8)
ERYTHROCYTE [DISTWIDTH] IN BLOOD BY AUTOMATED COUNT: 14 % (ref 11.5–14.5)
EST. GFR  (AFRICAN AMERICAN): 48.4 ML/MIN/1.73 M^2
EST. GFR  (NON AFRICAN AMERICAN): 42 ML/MIN/1.73 M^2
GLUCOSE SERPL-MCNC: 230 MG/DL (ref 70–110)
HCT VFR BLD AUTO: 36.6 % (ref 37–48.5)
HGB BLD-MCNC: 12.3 G/DL (ref 12–16)
IMM GRANULOCYTES # BLD AUTO: 0.04 K/UL (ref 0–0.04)
IMM GRANULOCYTES NFR BLD AUTO: 0.3 % (ref 0–0.5)
LYMPHOCYTES # BLD AUTO: 1.5 K/UL (ref 1–4.8)
LYMPHOCYTES NFR BLD: 10.3 % (ref 18–48)
MCH RBC QN AUTO: 28.3 PG (ref 27–31)
MCHC RBC AUTO-ENTMCNC: 33.6 G/DL (ref 32–36)
MCV RBC AUTO: 84 FL (ref 82–98)
MONOCYTES # BLD AUTO: 0.8 K/UL (ref 0.3–1)
MONOCYTES NFR BLD: 5.8 % (ref 4–15)
NEUTROPHILS # BLD AUTO: 12 K/UL (ref 1.8–7.7)
NEUTROPHILS NFR BLD: 83.3 % (ref 38–73)
NRBC BLD-RTO: 0 /100 WBC
PLATELET # BLD AUTO: 443 K/UL (ref 150–350)
PMV BLD AUTO: 8.7 FL (ref 9.2–12.9)
POTASSIUM SERPL-SCNC: 3.4 MMOL/L (ref 3.5–5.1)
PROT SERPL-MCNC: 8.7 G/DL (ref 6–8.4)
RBC # BLD AUTO: 4.35 M/UL (ref 4–5.4)
SODIUM SERPL-SCNC: 135 MMOL/L (ref 136–145)
WBC # BLD AUTO: 14.35 K/UL (ref 3.9–12.7)

## 2021-03-26 PROCEDURE — 99024 POSTOP FOLLOW-UP VISIT: CPT | Mod: S$GLB,,, | Performed by: COLON & RECTAL SURGERY

## 2021-03-26 PROCEDURE — 99999 PR PBB SHADOW E&M-EST. PATIENT-LVL III: ICD-10-PCS | Mod: PBBFAC,,, | Performed by: COLON & RECTAL SURGERY

## 2021-03-26 PROCEDURE — 3008F BODY MASS INDEX DOCD: CPT | Mod: CPTII,S$GLB,, | Performed by: COLON & RECTAL SURGERY

## 2021-03-26 PROCEDURE — 85025 COMPLETE CBC W/AUTO DIFF WBC: CPT | Performed by: COLON & RECTAL SURGERY

## 2021-03-26 PROCEDURE — 3288F PR FALLS RISK ASSESSMENT DOCUMENTED: ICD-10-PCS | Mod: CPTII,S$GLB,, | Performed by: COLON & RECTAL SURGERY

## 2021-03-26 PROCEDURE — 1100F PR PT FALLS ASSESS DOC 2+ FALLS/FALL W/INJURY/YR: ICD-10-PCS | Mod: CPTII,S$GLB,, | Performed by: COLON & RECTAL SURGERY

## 2021-03-26 PROCEDURE — 36415 COLL VENOUS BLD VENIPUNCTURE: CPT | Performed by: COLON & RECTAL SURGERY

## 2021-03-26 PROCEDURE — 3288F FALL RISK ASSESSMENT DOCD: CPT | Mod: CPTII,S$GLB,, | Performed by: COLON & RECTAL SURGERY

## 2021-03-26 PROCEDURE — 99024 PR POST-OP FOLLOW-UP VISIT: ICD-10-PCS | Mod: S$GLB,,, | Performed by: COLON & RECTAL SURGERY

## 2021-03-26 PROCEDURE — 1125F AMNT PAIN NOTED PAIN PRSNT: CPT | Mod: S$GLB,,, | Performed by: COLON & RECTAL SURGERY

## 2021-03-26 PROCEDURE — 1125F PR PAIN SEVERITY QUANTIFIED, PAIN PRESENT: ICD-10-PCS | Mod: S$GLB,,, | Performed by: COLON & RECTAL SURGERY

## 2021-03-26 PROCEDURE — 86140 C-REACTIVE PROTEIN: CPT | Performed by: COLON & RECTAL SURGERY

## 2021-03-26 PROCEDURE — 1100F PTFALLS ASSESS-DOCD GE2>/YR: CPT | Mod: CPTII,S$GLB,, | Performed by: COLON & RECTAL SURGERY

## 2021-03-26 PROCEDURE — 80053 COMPREHEN METABOLIC PANEL: CPT | Performed by: COLON & RECTAL SURGERY

## 2021-03-26 PROCEDURE — 99999 PR PBB SHADOW E&M-EST. PATIENT-LVL III: CPT | Mod: PBBFAC,,, | Performed by: COLON & RECTAL SURGERY

## 2021-03-26 PROCEDURE — 3008F PR BODY MASS INDEX (BMI) DOCUMENTED: ICD-10-PCS | Mod: CPTII,S$GLB,, | Performed by: COLON & RECTAL SURGERY

## 2021-03-26 RX ORDER — CIPROFLOXACIN 500 MG/1
500 TABLET ORAL 2 TIMES DAILY
Qty: 20 TABLET | Refills: 0 | Status: SHIPPED | OUTPATIENT
Start: 2021-03-26 | End: 2022-02-03

## 2021-03-26 RX ORDER — METRONIDAZOLE 500 MG/1
500 TABLET ORAL EVERY 8 HOURS
Qty: 30 TABLET | Refills: 0 | Status: SHIPPED | OUTPATIENT
Start: 2021-03-26 | End: 2021-04-05

## 2021-04-09 ENCOUNTER — OFFICE VISIT (OUTPATIENT)
Dept: SURGERY | Facility: CLINIC | Age: 71
End: 2021-04-09
Attending: COLON & RECTAL SURGERY
Payer: MEDICARE

## 2021-04-09 ENCOUNTER — LAB VISIT (OUTPATIENT)
Dept: LAB | Facility: HOSPITAL | Age: 71
End: 2021-04-09
Attending: COLON & RECTAL SURGERY
Payer: MEDICARE

## 2021-04-09 VITALS
WEIGHT: 147 LBS | BODY MASS INDEX: 25.1 KG/M2 | DIASTOLIC BLOOD PRESSURE: 91 MMHG | HEART RATE: 81 BPM | SYSTOLIC BLOOD PRESSURE: 141 MMHG | HEIGHT: 64 IN

## 2021-04-09 DIAGNOSIS — R10.84 GENERALIZED ABDOMINAL PAIN: ICD-10-CM

## 2021-04-09 DIAGNOSIS — R10.84 GENERALIZED ABDOMINAL PAIN: Primary | ICD-10-CM

## 2021-04-09 DIAGNOSIS — Z98.890 POST-OPERATIVE STATE: ICD-10-CM

## 2021-04-09 LAB
ANION GAP SERPL CALC-SCNC: 7 MMOL/L (ref 8–16)
BASOPHILS # BLD AUTO: 0.04 K/UL (ref 0–0.2)
BASOPHILS NFR BLD: 0.4 % (ref 0–1.9)
BUN SERPL-MCNC: 16 MG/DL (ref 8–23)
CALCIUM SERPL-MCNC: 9.7 MG/DL (ref 8.7–10.5)
CHLORIDE SERPL-SCNC: 107 MMOL/L (ref 95–110)
CO2 SERPL-SCNC: 21 MMOL/L (ref 23–29)
CREAT SERPL-MCNC: 1.1 MG/DL (ref 0.5–1.4)
CRP SERPL-MCNC: 12.3 MG/L (ref 0–8.2)
DIFFERENTIAL METHOD: ABNORMAL
EOSINOPHIL # BLD AUTO: 0.1 K/UL (ref 0–0.5)
EOSINOPHIL NFR BLD: 1.2 % (ref 0–8)
ERYTHROCYTE [DISTWIDTH] IN BLOOD BY AUTOMATED COUNT: 14.1 % (ref 11.5–14.5)
EST. GFR  (AFRICAN AMERICAN): 59.2 ML/MIN/1.73 M^2
EST. GFR  (NON AFRICAN AMERICAN): 51.3 ML/MIN/1.73 M^2
GLUCOSE SERPL-MCNC: 149 MG/DL (ref 70–110)
HCT VFR BLD AUTO: 36.5 % (ref 37–48.5)
HGB BLD-MCNC: 12.1 G/DL (ref 12–16)
IMM GRANULOCYTES # BLD AUTO: 0.02 K/UL (ref 0–0.04)
IMM GRANULOCYTES NFR BLD AUTO: 0.2 % (ref 0–0.5)
LYMPHOCYTES # BLD AUTO: 2.1 K/UL (ref 1–4.8)
LYMPHOCYTES NFR BLD: 20.8 % (ref 18–48)
MCH RBC QN AUTO: 27.8 PG (ref 27–31)
MCHC RBC AUTO-ENTMCNC: 33.2 G/DL (ref 32–36)
MCV RBC AUTO: 84 FL (ref 82–98)
MONOCYTES # BLD AUTO: 0.6 K/UL (ref 0.3–1)
MONOCYTES NFR BLD: 5.6 % (ref 4–15)
NEUTROPHILS # BLD AUTO: 7.2 K/UL (ref 1.8–7.7)
NEUTROPHILS NFR BLD: 71.8 % (ref 38–73)
NRBC BLD-RTO: 0 /100 WBC
PLATELET # BLD AUTO: 398 K/UL (ref 150–450)
PMV BLD AUTO: 8.2 FL (ref 9.2–12.9)
POTASSIUM SERPL-SCNC: 4.2 MMOL/L (ref 3.5–5.1)
RBC # BLD AUTO: 4.35 M/UL (ref 4–5.4)
SODIUM SERPL-SCNC: 135 MMOL/L (ref 136–145)
WBC # BLD AUTO: 10.09 K/UL (ref 3.9–12.7)

## 2021-04-09 PROCEDURE — 99999 PR PBB SHADOW E&M-EST. PATIENT-LVL III: ICD-10-PCS | Mod: PBBFAC,,, | Performed by: COLON & RECTAL SURGERY

## 2021-04-09 PROCEDURE — 36415 COLL VENOUS BLD VENIPUNCTURE: CPT | Performed by: COLON & RECTAL SURGERY

## 2021-04-09 PROCEDURE — 3288F FALL RISK ASSESSMENT DOCD: CPT | Mod: CPTII,S$GLB,, | Performed by: COLON & RECTAL SURGERY

## 2021-04-09 PROCEDURE — 99024 POSTOP FOLLOW-UP VISIT: CPT | Mod: S$GLB,,, | Performed by: COLON & RECTAL SURGERY

## 2021-04-09 PROCEDURE — 1126F AMNT PAIN NOTED NONE PRSNT: CPT | Mod: S$GLB,,, | Performed by: COLON & RECTAL SURGERY

## 2021-04-09 PROCEDURE — 86140 C-REACTIVE PROTEIN: CPT | Performed by: COLON & RECTAL SURGERY

## 2021-04-09 PROCEDURE — 3008F PR BODY MASS INDEX (BMI) DOCUMENTED: ICD-10-PCS | Mod: CPTII,S$GLB,, | Performed by: COLON & RECTAL SURGERY

## 2021-04-09 PROCEDURE — 85025 COMPLETE CBC W/AUTO DIFF WBC: CPT | Performed by: COLON & RECTAL SURGERY

## 2021-04-09 PROCEDURE — 1101F PR PT FALLS ASSESS DOC 0-1 FALLS W/OUT INJ PAST YR: ICD-10-PCS | Mod: CPTII,S$GLB,, | Performed by: COLON & RECTAL SURGERY

## 2021-04-09 PROCEDURE — 1101F PT FALLS ASSESS-DOCD LE1/YR: CPT | Mod: CPTII,S$GLB,, | Performed by: COLON & RECTAL SURGERY

## 2021-04-09 PROCEDURE — 3008F BODY MASS INDEX DOCD: CPT | Mod: CPTII,S$GLB,, | Performed by: COLON & RECTAL SURGERY

## 2021-04-09 PROCEDURE — 99024 PR POST-OP FOLLOW-UP VISIT: ICD-10-PCS | Mod: S$GLB,,, | Performed by: COLON & RECTAL SURGERY

## 2021-04-09 PROCEDURE — 99999 PR PBB SHADOW E&M-EST. PATIENT-LVL III: CPT | Mod: PBBFAC,,, | Performed by: COLON & RECTAL SURGERY

## 2021-04-09 PROCEDURE — 3288F PR FALLS RISK ASSESSMENT DOCUMENTED: ICD-10-PCS | Mod: CPTII,S$GLB,, | Performed by: COLON & RECTAL SURGERY

## 2021-04-09 PROCEDURE — 1126F PR PAIN SEVERITY QUANTIFIED, NO PAIN PRESENT: ICD-10-PCS | Mod: S$GLB,,, | Performed by: COLON & RECTAL SURGERY

## 2021-04-09 PROCEDURE — 80048 BASIC METABOLIC PNL TOTAL CA: CPT | Performed by: COLON & RECTAL SURGERY

## 2021-04-09 RX ORDER — METFORMIN HYDROCHLORIDE 500 MG/1
1000 TABLET, EXTENDED RELEASE ORAL 2 TIMES DAILY WITH MEALS
Status: ON HOLD | COMMUNITY
End: 2023-12-21 | Stop reason: HOSPADM

## 2021-04-13 ENCOUNTER — TELEPHONE (OUTPATIENT)
Dept: ORTHOPEDICS | Facility: CLINIC | Age: 71
End: 2021-04-13

## 2021-04-13 DIAGNOSIS — M25.511 RIGHT SHOULDER PAIN, UNSPECIFIED CHRONICITY: Primary | ICD-10-CM

## 2021-04-14 ENCOUNTER — HOSPITAL ENCOUNTER (OUTPATIENT)
Dept: RADIOLOGY | Facility: HOSPITAL | Age: 71
Discharge: HOME | End: 2021-04-14
Attending: ORTHOPAEDIC SURGERY
Payer: MEDICARE

## 2021-04-14 ENCOUNTER — OFFICE VISIT (OUTPATIENT)
Dept: ORTHOPEDICS | Facility: CLINIC | Age: 71
End: 2021-04-14
Payer: MEDICARE

## 2021-04-14 VITALS
DIASTOLIC BLOOD PRESSURE: 82 MMHG | SYSTOLIC BLOOD PRESSURE: 122 MMHG | HEIGHT: 64 IN | HEART RATE: 103 BPM | BODY MASS INDEX: 25.31 KG/M2 | WEIGHT: 148.25 LBS

## 2021-04-14 DIAGNOSIS — M25.511 RIGHT SHOULDER PAIN, UNSPECIFIED CHRONICITY: ICD-10-CM

## 2021-04-14 DIAGNOSIS — Z79.4 TYPE 2 DIABETES MELLITUS WITHOUT COMPLICATION, WITH LONG-TERM CURRENT USE OF INSULIN: Chronic | ICD-10-CM

## 2021-04-14 DIAGNOSIS — M75.41 ROTATOR CUFF IMPINGEMENT SYNDROME OF RIGHT SHOULDER: Primary | ICD-10-CM

## 2021-04-14 DIAGNOSIS — E11.9 TYPE 2 DIABETES MELLITUS WITHOUT COMPLICATION, WITH LONG-TERM CURRENT USE OF INSULIN: Chronic | ICD-10-CM

## 2021-04-14 PROCEDURE — 99204 OFFICE O/P NEW MOD 45 MIN: CPT | Mod: 25,S$GLB,, | Performed by: ORTHOPAEDIC SURGERY

## 2021-04-14 PROCEDURE — 1159F MED LIST DOCD IN RCRD: CPT | Mod: S$GLB,,, | Performed by: ORTHOPAEDIC SURGERY

## 2021-04-14 PROCEDURE — 73030 XR SHOULDER COMPLETE 2 OR MORE VIEWS RIGHT: ICD-10-PCS | Mod: 26,RT,, | Performed by: RADIOLOGY

## 2021-04-14 PROCEDURE — 1125F AMNT PAIN NOTED PAIN PRSNT: CPT | Mod: S$GLB,,, | Performed by: ORTHOPAEDIC SURGERY

## 2021-04-14 PROCEDURE — 1159F PR MEDICATION LIST DOCUMENTED IN MEDICAL RECORD: ICD-10-PCS | Mod: S$GLB,,, | Performed by: ORTHOPAEDIC SURGERY

## 2021-04-14 PROCEDURE — 3288F FALL RISK ASSESSMENT DOCD: CPT | Mod: CPTII,S$GLB,, | Performed by: ORTHOPAEDIC SURGERY

## 2021-04-14 PROCEDURE — 3008F PR BODY MASS INDEX (BMI) DOCUMENTED: ICD-10-PCS | Mod: CPTII,S$GLB,, | Performed by: ORTHOPAEDIC SURGERY

## 2021-04-14 PROCEDURE — 1101F PR PT FALLS ASSESS DOC 0-1 FALLS W/OUT INJ PAST YR: ICD-10-PCS | Mod: CPTII,S$GLB,, | Performed by: ORTHOPAEDIC SURGERY

## 2021-04-14 PROCEDURE — 99204 PR OFFICE/OUTPT VISIT, NEW, LEVL IV, 45-59 MIN: ICD-10-PCS | Mod: 25,S$GLB,, | Performed by: ORTHOPAEDIC SURGERY

## 2021-04-14 PROCEDURE — 1125F PR PAIN SEVERITY QUANTIFIED, PAIN PRESENT: ICD-10-PCS | Mod: S$GLB,,, | Performed by: ORTHOPAEDIC SURGERY

## 2021-04-14 PROCEDURE — 73030 X-RAY EXAM OF SHOULDER: CPT | Mod: TC,FY,RT

## 2021-04-14 PROCEDURE — 20610 LARGE JOINT ASPIRATION/INJECTION: R SUBACROMIAL BURSA: ICD-10-PCS | Mod: RT,S$GLB,, | Performed by: ORTHOPAEDIC SURGERY

## 2021-04-14 PROCEDURE — 3288F PR FALLS RISK ASSESSMENT DOCUMENTED: ICD-10-PCS | Mod: CPTII,S$GLB,, | Performed by: ORTHOPAEDIC SURGERY

## 2021-04-14 PROCEDURE — 3008F BODY MASS INDEX DOCD: CPT | Mod: CPTII,S$GLB,, | Performed by: ORTHOPAEDIC SURGERY

## 2021-04-14 PROCEDURE — 99999 PR PBB SHADOW E&M-EST. PATIENT-LVL IV: CPT | Mod: PBBFAC,,, | Performed by: ORTHOPAEDIC SURGERY

## 2021-04-14 PROCEDURE — 20610 DRAIN/INJ JOINT/BURSA W/O US: CPT | Mod: RT,S$GLB,, | Performed by: ORTHOPAEDIC SURGERY

## 2021-04-14 PROCEDURE — 1101F PT FALLS ASSESS-DOCD LE1/YR: CPT | Mod: CPTII,S$GLB,, | Performed by: ORTHOPAEDIC SURGERY

## 2021-04-14 PROCEDURE — 73030 X-RAY EXAM OF SHOULDER: CPT | Mod: 26,RT,, | Performed by: RADIOLOGY

## 2021-04-14 PROCEDURE — 99999 PR PBB SHADOW E&M-EST. PATIENT-LVL IV: ICD-10-PCS | Mod: PBBFAC,,, | Performed by: ORTHOPAEDIC SURGERY

## 2021-04-14 RX ORDER — ALBUTEROL SULFATE 0.83 MG/ML
SOLUTION RESPIRATORY (INHALATION)
COMMUNITY
Start: 2021-03-24 | End: 2022-02-03

## 2021-04-14 RX ORDER — CARVEDILOL PHOSPHATE 20 MG/1
40 CAPSULE, EXTENDED RELEASE ORAL DAILY
COMMUNITY
Start: 2021-03-25 | End: 2022-02-04

## 2021-04-14 RX ORDER — TRAMADOL HYDROCHLORIDE 50 MG/1
TABLET ORAL
COMMUNITY
Start: 2021-03-25 | End: 2021-07-16

## 2021-04-14 RX ORDER — TRIAMCINOLONE ACETONIDE 40 MG/ML
40 INJECTION, SUSPENSION INTRA-ARTICULAR; INTRAMUSCULAR
Status: DISCONTINUED | OUTPATIENT
Start: 2021-04-14 | End: 2021-04-14 | Stop reason: HOSPADM

## 2021-04-14 RX ADMIN — TRIAMCINOLONE ACETONIDE 40 MG: 40 INJECTION, SUSPENSION INTRA-ARTICULAR; INTRAMUSCULAR at 11:04

## 2021-04-20 NOTE — PLAN OF CARE
Appt made 04/21 for bilateral knee Synviscone injections   Problem: Patient Care Overview  Goal: Plan of Care Review  Outcome: Ongoing (interventions implemented as appropriate)  Pt awake and alert. Language line used for all communication with patient; Uzbek speaking patient. Pt informed of language line and interpreting services free of charge to patient. Contact precautions maintained per order. Complaints of lower abdominal pain with norco given, slight relief. MD notified on pain. Abdominal dressing changed per orders. Blood sugar checked with Sliding scale given per ordered. Zosyn given per orders. Up with assist to bathroom. Safety maintained.

## 2021-04-23 ENCOUNTER — OFFICE VISIT (OUTPATIENT)
Dept: SURGERY | Facility: CLINIC | Age: 71
End: 2021-04-23
Attending: COLON & RECTAL SURGERY
Payer: MEDICARE

## 2021-04-23 VITALS
HEIGHT: 64 IN | DIASTOLIC BLOOD PRESSURE: 85 MMHG | BODY MASS INDEX: 24.56 KG/M2 | HEART RATE: 83 BPM | SYSTOLIC BLOOD PRESSURE: 167 MMHG | WEIGHT: 143.88 LBS

## 2021-04-23 DIAGNOSIS — Z98.890 POST-OPERATIVE STATE: Primary | ICD-10-CM

## 2021-04-23 PROCEDURE — 1125F AMNT PAIN NOTED PAIN PRSNT: CPT | Mod: S$GLB,,, | Performed by: COLON & RECTAL SURGERY

## 2021-04-23 PROCEDURE — 99024 PR POST-OP FOLLOW-UP VISIT: ICD-10-PCS | Mod: S$GLB,,, | Performed by: COLON & RECTAL SURGERY

## 2021-04-23 PROCEDURE — 1125F PR PAIN SEVERITY QUANTIFIED, PAIN PRESENT: ICD-10-PCS | Mod: S$GLB,,, | Performed by: COLON & RECTAL SURGERY

## 2021-04-23 PROCEDURE — 1101F PT FALLS ASSESS-DOCD LE1/YR: CPT | Mod: CPTII,S$GLB,, | Performed by: COLON & RECTAL SURGERY

## 2021-04-23 PROCEDURE — 3008F BODY MASS INDEX DOCD: CPT | Mod: CPTII,S$GLB,, | Performed by: COLON & RECTAL SURGERY

## 2021-04-23 PROCEDURE — 3288F FALL RISK ASSESSMENT DOCD: CPT | Mod: CPTII,S$GLB,, | Performed by: COLON & RECTAL SURGERY

## 2021-04-23 PROCEDURE — 99999 PR PBB SHADOW E&M-EST. PATIENT-LVL III: ICD-10-PCS | Mod: PBBFAC,,, | Performed by: COLON & RECTAL SURGERY

## 2021-04-23 PROCEDURE — 3288F PR FALLS RISK ASSESSMENT DOCUMENTED: ICD-10-PCS | Mod: CPTII,S$GLB,, | Performed by: COLON & RECTAL SURGERY

## 2021-04-23 PROCEDURE — 99024 POSTOP FOLLOW-UP VISIT: CPT | Mod: S$GLB,,, | Performed by: COLON & RECTAL SURGERY

## 2021-04-23 PROCEDURE — 3008F PR BODY MASS INDEX (BMI) DOCUMENTED: ICD-10-PCS | Mod: CPTII,S$GLB,, | Performed by: COLON & RECTAL SURGERY

## 2021-04-23 PROCEDURE — 99999 PR PBB SHADOW E&M-EST. PATIENT-LVL III: CPT | Mod: PBBFAC,,, | Performed by: COLON & RECTAL SURGERY

## 2021-04-23 PROCEDURE — 1101F PR PT FALLS ASSESS DOC 0-1 FALLS W/OUT INJ PAST YR: ICD-10-PCS | Mod: CPTII,S$GLB,, | Performed by: COLON & RECTAL SURGERY

## 2021-05-02 NOTE — CARE UPDATE
BG goal 140-180  BG below goal ranges with no prn insulin requirements. NPO and TPN off. TPN expected to resume today. Expect increase insulin requirements while on TPN therapy.     Continue  Moderate Dose Correction Scale  BG monitoring q 4 hrs while NPO/TPN    Will consider scheduled Novolog if prandial excursions are noted with TPN therapy.      ** Please call Endocrine for any BG related issues **     Discharge plans:  TBD   PA at bedside--CXR ordered/3rd chest tube placed/pending repeat xray 1 amp dextrose pa assessed site and reinforced dressing. as per PA patient sats ok for now and she will return to restitch the site

## 2021-05-19 NOTE — RESPIRATORY THERAPY
Rapid Response Respiratory Therapy ETCO2 Check     Time of visit: 819    Code Status: Full Code   : 1950  Bed: 1022/1022 A:   MRN: [id number]    SITUATION     Evaluated patient for: ETCO2 Compliance     BACKGROUND     Patient has a past medical history of Chronic kidney disease, stage 3, Diabetes mellitus, Diabetes mellitus, type 2, Hypertension, and UTI (urinary tract infection).    ASSESSMENT     Is the ETCO2 monitor on? (Yes/No)  yes   Is the patient wearing a cannula? (Yes/No) yes  Are ETCO2 orders placed? (Yes/No) yes  Is the patient on a PCA pump? (Yes/No) yes  ETCO2 monitored: ETCO2 (mmHg): 27 mmHg  O2 Device/Concentration:   Pulse:  Respiratory rate:  Temperature: Temp: 98.3 °F (36.8 °C) BP: BP: (!) 150/76 SpO2:   Level of Consciousness: Level of Consciousness (AVPU): alert  All Lung Field Breath Sounds: All Lung Fields Breath Sounds: Anterior:, Lateral:, diminished  MAGED Breath Sounds: diminished  LLL Breath Sounds: diminished  RUL Breath Sounds: diminished  RML Breath Sounds: diminished  RLL Breath Sounds: diminished  Ambu at bedside: Ambu bag with the patient?: Yes, Adult Ambu    INTERVENTIONS/RECOMMENDATIONS         PHYSICIAN ESCALATION     Physician Escalation (Yes/No):      Care discussed with:   Discussed plan of care primary RT,      FOLLOW-UP     Please call back the Rapid Response RT, Agnes Taylor, RRT at x [phone] for any questions or concerns.                  Detail Level: Detailed

## 2021-07-01 ENCOUNTER — TELEPHONE (OUTPATIENT)
Dept: SURGERY | Facility: CLINIC | Age: 71
End: 2021-07-01

## 2021-07-16 ENCOUNTER — OFFICE VISIT (OUTPATIENT)
Dept: SURGERY | Facility: CLINIC | Age: 71
End: 2021-07-16
Attending: COLON & RECTAL SURGERY
Payer: MEDICARE

## 2021-07-16 VITALS
DIASTOLIC BLOOD PRESSURE: 95 MMHG | BODY MASS INDEX: 26.32 KG/M2 | HEART RATE: 82 BPM | SYSTOLIC BLOOD PRESSURE: 161 MMHG | WEIGHT: 154.19 LBS | HEIGHT: 64 IN

## 2021-07-16 DIAGNOSIS — Z98.890 POST-OPERATIVE STATE: Primary | ICD-10-CM

## 2021-07-16 PROCEDURE — 3288F PR FALLS RISK ASSESSMENT DOCUMENTED: ICD-10-PCS | Mod: CPTII,S$GLB,, | Performed by: COLON & RECTAL SURGERY

## 2021-07-16 PROCEDURE — 3008F PR BODY MASS INDEX (BMI) DOCUMENTED: ICD-10-PCS | Mod: CPTII,S$GLB,, | Performed by: COLON & RECTAL SURGERY

## 2021-07-16 PROCEDURE — 1159F PR MEDICATION LIST DOCUMENTED IN MEDICAL RECORD: ICD-10-PCS | Mod: S$GLB,,, | Performed by: COLON & RECTAL SURGERY

## 2021-07-16 PROCEDURE — 1101F PT FALLS ASSESS-DOCD LE1/YR: CPT | Mod: CPTII,S$GLB,, | Performed by: COLON & RECTAL SURGERY

## 2021-07-16 PROCEDURE — 99999 PR PBB SHADOW E&M-EST. PATIENT-LVL III: ICD-10-PCS | Mod: PBBFAC,,, | Performed by: COLON & RECTAL SURGERY

## 2021-07-16 PROCEDURE — 1159F MED LIST DOCD IN RCRD: CPT | Mod: S$GLB,,, | Performed by: COLON & RECTAL SURGERY

## 2021-07-16 PROCEDURE — 3288F FALL RISK ASSESSMENT DOCD: CPT | Mod: CPTII,S$GLB,, | Performed by: COLON & RECTAL SURGERY

## 2021-07-16 PROCEDURE — 99999 PR PBB SHADOW E&M-EST. PATIENT-LVL III: CPT | Mod: PBBFAC,,, | Performed by: COLON & RECTAL SURGERY

## 2021-07-16 PROCEDURE — 3008F BODY MASS INDEX DOCD: CPT | Mod: CPTII,S$GLB,, | Performed by: COLON & RECTAL SURGERY

## 2021-07-16 PROCEDURE — 1101F PR PT FALLS ASSESS DOC 0-1 FALLS W/OUT INJ PAST YR: ICD-10-PCS | Mod: CPTII,S$GLB,, | Performed by: COLON & RECTAL SURGERY

## 2021-07-16 PROCEDURE — 1125F AMNT PAIN NOTED PAIN PRSNT: CPT | Mod: S$GLB,,, | Performed by: COLON & RECTAL SURGERY

## 2021-07-16 PROCEDURE — 99213 OFFICE O/P EST LOW 20 MIN: CPT | Mod: S$GLB,,, | Performed by: COLON & RECTAL SURGERY

## 2021-07-16 PROCEDURE — 1125F PR PAIN SEVERITY QUANTIFIED, PAIN PRESENT: ICD-10-PCS | Mod: S$GLB,,, | Performed by: COLON & RECTAL SURGERY

## 2021-07-16 PROCEDURE — 99213 PR OFFICE/OUTPT VISIT, EST, LEVL III, 20-29 MIN: ICD-10-PCS | Mod: S$GLB,,, | Performed by: COLON & RECTAL SURGERY

## 2021-07-16 RX ORDER — PANTOPRAZOLE SODIUM 40 MG/1
40 TABLET, DELAYED RELEASE ORAL DAILY
Qty: 30 TABLET | Refills: 11 | Status: SHIPPED | OUTPATIENT
Start: 2021-07-16 | End: 2021-11-18

## 2021-11-10 NOTE — ASSESSMENT & PLAN NOTE
70 y.o. female 18 Days Post-Op for Procedure(s) (LRB):  RESECTION, COLON, LOW ANTERIOR (N/A)  CYSTOSCOPY (N/A)  CATHETERIZATION, URETER (Bilateral)  CREATION, ILEOSTOMY  LYSIS, ADHESIONS   IR drain placed 8/6: cultures growing Ecoli sensitive to Ertapenem (also has a UTI with the same Ecoli). Drain upsized  By IR on 8/12  And additional drain placed 8/17  IV antibiotics switched to meropenem by ID and patient placed on contact precautions due to ESBL. Appreciate ID recommendations.    Continue diabetic diet as tolerated  Added scopolamine patch   Continue TPN   SCDs/DVT prophylaxis  GI prophylaxis  Encourage IS  PT/OT. Encourage to chair and ambulation.  IR today for drain placement for interval increased size in fluid collections    Continue communication with use of      [id number]

## 2021-11-18 ENCOUNTER — OFFICE VISIT (OUTPATIENT)
Dept: OBSTETRICS AND GYNECOLOGY | Facility: CLINIC | Age: 71
End: 2021-11-18
Payer: MEDICARE

## 2021-11-18 VITALS
BODY MASS INDEX: 27.97 KG/M2 | DIASTOLIC BLOOD PRESSURE: 92 MMHG | SYSTOLIC BLOOD PRESSURE: 158 MMHG | WEIGHT: 162.94 LBS

## 2021-11-18 DIAGNOSIS — N85.00 ENDOMETRIAL HYPERPLASIA: ICD-10-CM

## 2021-11-18 DIAGNOSIS — Z01.419 ENCOUNTER FOR ANNUAL ROUTINE GYNECOLOGICAL EXAMINATION: Primary | ICD-10-CM

## 2021-11-18 DIAGNOSIS — Z12.31 SCREENING MAMMOGRAM, ENCOUNTER FOR: ICD-10-CM

## 2021-11-18 PROCEDURE — 58100 BIOPSY OF UTERUS LINING: CPT | Mod: S$GLB,,, | Performed by: OBSTETRICS & GYNECOLOGY

## 2021-11-18 PROCEDURE — G0101 PR CA SCREEN;PELVIC/BREAST EXAM: ICD-10-PCS | Mod: GZ,S$GLB,, | Performed by: OBSTETRICS & GYNECOLOGY

## 2021-11-18 PROCEDURE — 1126F PR PAIN SEVERITY QUANTIFIED, NO PAIN PRESENT: ICD-10-PCS | Mod: CPTII,S$GLB,, | Performed by: OBSTETRICS & GYNECOLOGY

## 2021-11-18 PROCEDURE — 88305 TISSUE EXAM BY PATHOLOGIST: ICD-10-PCS | Mod: 26,,, | Performed by: PATHOLOGY

## 2021-11-18 PROCEDURE — 88305 TISSUE EXAM BY PATHOLOGIST: CPT | Performed by: PATHOLOGY

## 2021-11-18 PROCEDURE — 3077F PR MOST RECENT SYSTOLIC BLOOD PRESSURE >= 140 MM HG: ICD-10-PCS | Mod: CPTII,S$GLB,, | Performed by: OBSTETRICS & GYNECOLOGY

## 2021-11-18 PROCEDURE — 1159F MED LIST DOCD IN RCRD: CPT | Mod: CPTII,S$GLB,, | Performed by: OBSTETRICS & GYNECOLOGY

## 2021-11-18 PROCEDURE — 99999 PR PBB SHADOW E&M-EST. PATIENT-LVL III: CPT | Mod: PBBFAC,,, | Performed by: OBSTETRICS & GYNECOLOGY

## 2021-11-18 PROCEDURE — 3288F PR FALLS RISK ASSESSMENT DOCUMENTED: ICD-10-PCS | Mod: CPTII,S$GLB,, | Performed by: OBSTETRICS & GYNECOLOGY

## 2021-11-18 PROCEDURE — 3080F PR MOST RECENT DIASTOLIC BLOOD PRESSURE >= 90 MM HG: ICD-10-PCS | Mod: CPTII,S$GLB,, | Performed by: OBSTETRICS & GYNECOLOGY

## 2021-11-18 PROCEDURE — 3288F FALL RISK ASSESSMENT DOCD: CPT | Mod: CPTII,S$GLB,, | Performed by: OBSTETRICS & GYNECOLOGY

## 2021-11-18 PROCEDURE — 1126F AMNT PAIN NOTED NONE PRSNT: CPT | Mod: CPTII,S$GLB,, | Performed by: OBSTETRICS & GYNECOLOGY

## 2021-11-18 PROCEDURE — 3080F DIAST BP >= 90 MM HG: CPT | Mod: CPTII,S$GLB,, | Performed by: OBSTETRICS & GYNECOLOGY

## 2021-11-18 PROCEDURE — 88305 TISSUE EXAM BY PATHOLOGIST: CPT | Mod: 26,,, | Performed by: PATHOLOGY

## 2021-11-18 PROCEDURE — 58100 PR BIOPSY OF UTERUS LINING: ICD-10-PCS | Mod: S$GLB,,, | Performed by: OBSTETRICS & GYNECOLOGY

## 2021-11-18 PROCEDURE — 3077F SYST BP >= 140 MM HG: CPT | Mod: CPTII,S$GLB,, | Performed by: OBSTETRICS & GYNECOLOGY

## 2021-11-18 PROCEDURE — 99999 PR PBB SHADOW E&M-EST. PATIENT-LVL III: ICD-10-PCS | Mod: PBBFAC,,, | Performed by: OBSTETRICS & GYNECOLOGY

## 2021-11-18 PROCEDURE — 3008F BODY MASS INDEX DOCD: CPT | Mod: CPTII,S$GLB,, | Performed by: OBSTETRICS & GYNECOLOGY

## 2021-11-18 PROCEDURE — 1101F PT FALLS ASSESS-DOCD LE1/YR: CPT | Mod: CPTII,S$GLB,, | Performed by: OBSTETRICS & GYNECOLOGY

## 2021-11-18 PROCEDURE — 1159F PR MEDICATION LIST DOCUMENTED IN MEDICAL RECORD: ICD-10-PCS | Mod: CPTII,S$GLB,, | Performed by: OBSTETRICS & GYNECOLOGY

## 2021-11-18 PROCEDURE — 1160F RVW MEDS BY RX/DR IN RCRD: CPT | Mod: CPTII,S$GLB,, | Performed by: OBSTETRICS & GYNECOLOGY

## 2021-11-18 PROCEDURE — 3008F PR BODY MASS INDEX (BMI) DOCUMENTED: ICD-10-PCS | Mod: CPTII,S$GLB,, | Performed by: OBSTETRICS & GYNECOLOGY

## 2021-11-18 PROCEDURE — 1160F PR REVIEW ALL MEDS BY PRESCRIBER/CLIN PHARMACIST DOCUMENTED: ICD-10-PCS | Mod: CPTII,S$GLB,, | Performed by: OBSTETRICS & GYNECOLOGY

## 2021-11-18 PROCEDURE — 1101F PR PT FALLS ASSESS DOC 0-1 FALLS W/OUT INJ PAST YR: ICD-10-PCS | Mod: CPTII,S$GLB,, | Performed by: OBSTETRICS & GYNECOLOGY

## 2021-11-18 PROCEDURE — G0101 CA SCREEN;PELVIC/BREAST EXAM: HCPCS | Mod: GZ,S$GLB,, | Performed by: OBSTETRICS & GYNECOLOGY

## 2021-11-18 RX ORDER — MEDROXYPROGESTERONE ACETATE 10 MG/1
10 TABLET ORAL DAILY
Qty: 30 TABLET | Refills: 11 | Status: SHIPPED | OUTPATIENT
Start: 2021-11-18 | End: 2022-08-05

## 2021-11-26 LAB
FINAL PATHOLOGIC DIAGNOSIS: NORMAL
GROSS: NORMAL
Lab: NORMAL

## 2021-12-15 ENCOUNTER — TELEPHONE (OUTPATIENT)
Dept: OBSTETRICS AND GYNECOLOGY | Facility: CLINIC | Age: 71
End: 2021-12-15
Payer: MEDICARE

## 2022-01-06 NOTE — PROGRESS NOTES
"CRS Office Visit Follow-up    SUBJECTIVE:     History of Present Illness:  Patient is a 71 y.o. female who presents following ileostomy reversal on 2/9/2021.   Overall doing really well.  No nausea/emesis.  Eating well. Has gained 7-8lbs.  Taking 2 Imodium BID, has about 3 BMs/day.  Some skin rawness with BMs.  No drainage from incision.  Seeing Gyn next month for repeat exam/biopsy.    Review of Systems:  ROS    OBJECTIVE:     Vital Signs (Most Recent)  BP (!) 160/90 (BP Location: Left arm, Patient Position: Sitting, BP Method: Large (Automatic))   Pulse 84   Ht 5' 4" (1.626 m)   Wt 73.4 kg (161 lb 12.8 oz)   LMP  (LMP Unknown)   BMI 27.77 kg/m²     Physical Exam:  General:  or Other  female in no distress   Neuro: Alert and oriented x 4.  Moves all extremities.     HEENT: No icterus.  Trachea midline  Respiratory: Respirations are even and unlabored  Cardiac: Regular rate  Abdomen: Soft, ileostomy incision and midline incision healed  Extremities: Warm dry and intact  Skin: Perianal skin with mild-mod skin excoriation. Anoscopy with Grade II hemorrhoid, no lesions or bleeding.      ASSESSMENT/PLAN:     69yo F s/p ileostomy reversal on 2/9/2021, overall doing very well  - RTC prn  - Based on history (last colonoscopy 2018 was without polyps, no family history) recommend colonoscopy in 2025    Fabiana Valiente MD  Staff Surgeon, Colon and Rectal Surgery  [facility]              "

## 2022-01-07 ENCOUNTER — OFFICE VISIT (OUTPATIENT)
Dept: SURGERY | Facility: CLINIC | Age: 72
End: 2022-01-07
Attending: COLON & RECTAL SURGERY
Payer: MEDICARE

## 2022-01-07 VITALS
SYSTOLIC BLOOD PRESSURE: 160 MMHG | WEIGHT: 161.81 LBS | HEIGHT: 64 IN | BODY MASS INDEX: 27.63 KG/M2 | HEART RATE: 84 BPM | DIASTOLIC BLOOD PRESSURE: 90 MMHG

## 2022-01-07 DIAGNOSIS — Z98.890 POST-OPERATIVE STATE: Primary | ICD-10-CM

## 2022-01-07 PROCEDURE — 3288F FALL RISK ASSESSMENT DOCD: CPT | Mod: CPTII,S$GLB,, | Performed by: COLON & RECTAL SURGERY

## 2022-01-07 PROCEDURE — 1126F AMNT PAIN NOTED NONE PRSNT: CPT | Mod: CPTII,S$GLB,, | Performed by: COLON & RECTAL SURGERY

## 2022-01-07 PROCEDURE — 99999 PR PBB SHADOW E&M-EST. PATIENT-LVL III: ICD-10-PCS | Mod: PBBFAC,,, | Performed by: COLON & RECTAL SURGERY

## 2022-01-07 PROCEDURE — 99213 OFFICE O/P EST LOW 20 MIN: CPT | Mod: S$GLB,,, | Performed by: COLON & RECTAL SURGERY

## 2022-01-07 PROCEDURE — 1159F MED LIST DOCD IN RCRD: CPT | Mod: CPTII,S$GLB,, | Performed by: COLON & RECTAL SURGERY

## 2022-01-07 PROCEDURE — 3288F PR FALLS RISK ASSESSMENT DOCUMENTED: ICD-10-PCS | Mod: CPTII,S$GLB,, | Performed by: COLON & RECTAL SURGERY

## 2022-01-07 PROCEDURE — 3080F DIAST BP >= 90 MM HG: CPT | Mod: CPTII,S$GLB,, | Performed by: COLON & RECTAL SURGERY

## 2022-01-07 PROCEDURE — 3008F BODY MASS INDEX DOCD: CPT | Mod: CPTII,S$GLB,, | Performed by: COLON & RECTAL SURGERY

## 2022-01-07 PROCEDURE — 1101F PT FALLS ASSESS-DOCD LE1/YR: CPT | Mod: CPTII,S$GLB,, | Performed by: COLON & RECTAL SURGERY

## 2022-01-07 PROCEDURE — 99213 PR OFFICE/OUTPT VISIT, EST, LEVL III, 20-29 MIN: ICD-10-PCS | Mod: S$GLB,,, | Performed by: COLON & RECTAL SURGERY

## 2022-01-07 PROCEDURE — 3077F PR MOST RECENT SYSTOLIC BLOOD PRESSURE >= 140 MM HG: ICD-10-PCS | Mod: CPTII,S$GLB,, | Performed by: COLON & RECTAL SURGERY

## 2022-01-07 PROCEDURE — 1160F RVW MEDS BY RX/DR IN RCRD: CPT | Mod: CPTII,S$GLB,, | Performed by: COLON & RECTAL SURGERY

## 2022-01-07 PROCEDURE — 3077F SYST BP >= 140 MM HG: CPT | Mod: CPTII,S$GLB,, | Performed by: COLON & RECTAL SURGERY

## 2022-01-07 PROCEDURE — 3080F PR MOST RECENT DIASTOLIC BLOOD PRESSURE >= 90 MM HG: ICD-10-PCS | Mod: CPTII,S$GLB,, | Performed by: COLON & RECTAL SURGERY

## 2022-01-07 PROCEDURE — 1126F PR PAIN SEVERITY QUANTIFIED, NO PAIN PRESENT: ICD-10-PCS | Mod: CPTII,S$GLB,, | Performed by: COLON & RECTAL SURGERY

## 2022-01-07 PROCEDURE — 1160F PR REVIEW ALL MEDS BY PRESCRIBER/CLIN PHARMACIST DOCUMENTED: ICD-10-PCS | Mod: CPTII,S$GLB,, | Performed by: COLON & RECTAL SURGERY

## 2022-01-07 PROCEDURE — 3008F PR BODY MASS INDEX (BMI) DOCUMENTED: ICD-10-PCS | Mod: CPTII,S$GLB,, | Performed by: COLON & RECTAL SURGERY

## 2022-01-07 PROCEDURE — 99999 PR PBB SHADOW E&M-EST. PATIENT-LVL III: CPT | Mod: PBBFAC,,, | Performed by: COLON & RECTAL SURGERY

## 2022-01-07 PROCEDURE — 1159F PR MEDICATION LIST DOCUMENTED IN MEDICAL RECORD: ICD-10-PCS | Mod: CPTII,S$GLB,, | Performed by: COLON & RECTAL SURGERY

## 2022-01-07 PROCEDURE — 1101F PR PT FALLS ASSESS DOC 0-1 FALLS W/OUT INJ PAST YR: ICD-10-PCS | Mod: CPTII,S$GLB,, | Performed by: COLON & RECTAL SURGERY

## 2022-01-10 ENCOUNTER — LAB VISIT (OUTPATIENT)
Dept: PRIMARY CARE CLINIC | Facility: CLINIC | Age: 72
End: 2022-01-10
Payer: MEDICARE

## 2022-01-10 DIAGNOSIS — Z20.822 CONTACT WITH AND (SUSPECTED) EXPOSURE TO COVID-19: ICD-10-CM

## 2022-01-10 LAB
CTP QC/QA: YES
SARS-COV-2 AG RESP QL IA.RAPID: NEGATIVE

## 2022-01-10 PROCEDURE — 87811 SARS-COV-2 COVID19 W/OPTIC: CPT

## 2022-02-02 ENCOUNTER — TELEPHONE (OUTPATIENT)
Dept: OBSTETRICS AND GYNECOLOGY | Facility: CLINIC | Age: 72
End: 2022-02-02
Payer: MEDICARE

## 2022-02-02 ENCOUNTER — HOSPITAL ENCOUNTER (OUTPATIENT)
Dept: RADIOLOGY | Facility: HOSPITAL | Age: 72
Discharge: HOME | End: 2022-02-02
Attending: OBSTETRICS & GYNECOLOGY
Payer: MEDICARE

## 2022-02-02 DIAGNOSIS — Z12.31 SCREENING MAMMOGRAM, ENCOUNTER FOR: ICD-10-CM

## 2022-02-02 PROCEDURE — 77067 MAMMO DIGITAL SCREENING BILAT WITH TOMO: ICD-10-PCS | Mod: 26,,, | Performed by: RADIOLOGY

## 2022-02-02 PROCEDURE — 77063 MAMMO DIGITAL SCREENING BILAT WITH TOMO: ICD-10-PCS | Mod: 26,,, | Performed by: RADIOLOGY

## 2022-02-02 PROCEDURE — 77063 BREAST TOMOSYNTHESIS BI: CPT | Mod: TC

## 2022-02-02 PROCEDURE — 77067 SCR MAMMO BI INCL CAD: CPT | Mod: 26,,, | Performed by: RADIOLOGY

## 2022-02-02 PROCEDURE — 77067 SCR MAMMO BI INCL CAD: CPT | Mod: TC

## 2022-02-02 PROCEDURE — 77063 BREAST TOMOSYNTHESIS BI: CPT | Mod: 26,,, | Performed by: RADIOLOGY

## 2022-02-03 ENCOUNTER — OFFICE VISIT (OUTPATIENT)
Dept: OBSTETRICS AND GYNECOLOGY | Facility: CLINIC | Age: 72
End: 2022-02-03
Payer: MEDICARE

## 2022-02-03 VITALS
WEIGHT: 164.38 LBS | SYSTOLIC BLOOD PRESSURE: 132 MMHG | DIASTOLIC BLOOD PRESSURE: 90 MMHG | BODY MASS INDEX: 28.21 KG/M2

## 2022-02-03 DIAGNOSIS — N85.00 ENDOMETRIAL HYPERPLASIA: ICD-10-CM

## 2022-02-03 DIAGNOSIS — Z01.818 PRE-OPERATIVE EXAM: Primary | ICD-10-CM

## 2022-02-03 PROCEDURE — 99499 UNLISTED E&M SERVICE: CPT | Mod: S$GLB,,, | Performed by: OBSTETRICS & GYNECOLOGY

## 2022-02-03 PROCEDURE — 3080F DIAST BP >= 90 MM HG: CPT | Mod: CPTII,S$GLB,, | Performed by: OBSTETRICS & GYNECOLOGY

## 2022-02-03 PROCEDURE — 3075F SYST BP GE 130 - 139MM HG: CPT | Mod: CPTII,S$GLB,, | Performed by: OBSTETRICS & GYNECOLOGY

## 2022-02-03 PROCEDURE — 1125F PR PAIN SEVERITY QUANTIFIED, PAIN PRESENT: ICD-10-PCS | Mod: CPTII,S$GLB,, | Performed by: OBSTETRICS & GYNECOLOGY

## 2022-02-03 PROCEDURE — 99499 NO LOS: ICD-10-PCS | Mod: S$GLB,,, | Performed by: OBSTETRICS & GYNECOLOGY

## 2022-02-03 PROCEDURE — 3008F BODY MASS INDEX DOCD: CPT | Mod: CPTII,S$GLB,, | Performed by: OBSTETRICS & GYNECOLOGY

## 2022-02-03 PROCEDURE — 1125F AMNT PAIN NOTED PAIN PRSNT: CPT | Mod: CPTII,S$GLB,, | Performed by: OBSTETRICS & GYNECOLOGY

## 2022-02-03 PROCEDURE — 3288F FALL RISK ASSESSMENT DOCD: CPT | Mod: CPTII,S$GLB,, | Performed by: OBSTETRICS & GYNECOLOGY

## 2022-02-03 PROCEDURE — 1101F PR PT FALLS ASSESS DOC 0-1 FALLS W/OUT INJ PAST YR: ICD-10-PCS | Mod: CPTII,S$GLB,, | Performed by: OBSTETRICS & GYNECOLOGY

## 2022-02-03 PROCEDURE — 99999 PR PBB SHADOW E&M-EST. PATIENT-LVL III: CPT | Mod: PBBFAC,,, | Performed by: OBSTETRICS & GYNECOLOGY

## 2022-02-03 PROCEDURE — 1101F PT FALLS ASSESS-DOCD LE1/YR: CPT | Mod: CPTII,S$GLB,, | Performed by: OBSTETRICS & GYNECOLOGY

## 2022-02-03 PROCEDURE — 3288F PR FALLS RISK ASSESSMENT DOCUMENTED: ICD-10-PCS | Mod: CPTII,S$GLB,, | Performed by: OBSTETRICS & GYNECOLOGY

## 2022-02-03 PROCEDURE — 99999 PR PBB SHADOW E&M-EST. PATIENT-LVL III: ICD-10-PCS | Mod: PBBFAC,,, | Performed by: OBSTETRICS & GYNECOLOGY

## 2022-02-03 PROCEDURE — 3075F PR MOST RECENT SYSTOLIC BLOOD PRESS GE 130-139MM HG: ICD-10-PCS | Mod: CPTII,S$GLB,, | Performed by: OBSTETRICS & GYNECOLOGY

## 2022-02-03 PROCEDURE — 3008F PR BODY MASS INDEX (BMI) DOCUMENTED: ICD-10-PCS | Mod: CPTII,S$GLB,, | Performed by: OBSTETRICS & GYNECOLOGY

## 2022-02-03 PROCEDURE — 3080F PR MOST RECENT DIASTOLIC BLOOD PRESSURE >= 90 MM HG: ICD-10-PCS | Mod: CPTII,S$GLB,, | Performed by: OBSTETRICS & GYNECOLOGY

## 2022-02-03 RX ORDER — LANCETS 33 GAUGE
EACH MISCELLANEOUS
COMMUNITY
Start: 2021-09-16 | End: 2023-04-02 | Stop reason: DRUGHIGH

## 2022-02-03 RX ORDER — CHOLESTYRAMINE LIGHT 4 G/5.7G
1 POWDER, FOR SUSPENSION ORAL 2 TIMES DAILY
COMMUNITY
Start: 2021-11-29 | End: 2022-08-05

## 2022-02-03 RX ORDER — ONDANSETRON 4 MG/1
TABLET, ORALLY DISINTEGRATING ORAL
COMMUNITY
Start: 2021-08-23 | End: 2022-08-05

## 2022-02-03 RX ORDER — MUPIROCIN 20 MG/G
OINTMENT TOPICAL
Status: CANCELLED | OUTPATIENT
Start: 2022-02-03

## 2022-02-03 RX ORDER — SODIUM CHLORIDE 9 MG/ML
INJECTION, SOLUTION INTRAVENOUS CONTINUOUS
Status: CANCELLED | OUTPATIENT
Start: 2022-02-03

## 2022-02-03 RX ORDER — CALCIUM CITRATE/VITAMIN D3 200MG-6.25
TABLET ORAL
COMMUNITY
Start: 2021-09-16

## 2022-02-03 RX ORDER — CICLOPIROX 80 MG/ML
SOLUTION TOPICAL DAILY PRN
COMMUNITY
Start: 2021-11-24 | End: 2023-04-02

## 2022-02-03 NOTE — PROGRESS NOTES
Diagnosis: history of endometrial hyperplasia   [place] [facility] [place]   Date of Planned Procedure:    Cc: I am here for preop for my surgery    HPI: Azucena Morrison is a 71 y.o. female with history of PMB and possible complex hyperplasia without atypia on EMB discussed with gyn onc who recommend [facility] which showed inactive endometrium and no hyperplasia so due to her h/o complex abdominal surgeries they recommended progesterone treatment and repeat EMB Q6 months. Has not had any more PMB but unable to get a sample from lining in office so will do EMB.         ROS:  GENERAL: Denies weight gain or weight loss. Feeling well overall.   SKIN: Denies rash or lesions.   HEAD: Denies head injury or headache.   CHEST: Denies chest pain or shortness of breath.   CARDIOVASCULAR: Denies palpitations or left sided chest pain.   ABDOMEN: No abdominal pain, constipation, diarrhea, nausea, vomiting or rectal bleeding.   URINARY: No frequency, dysuria, hematuria, or burning on urination.  REPRODUCTIVE: See HPI.   HEMATOLOGIC: No easy bruisability or excessive bleeding.   MUSCULOSKELETAL: Denies joint pain or swelling.   NEUROLOGIC: Denies syncope or weakness.   PSYCHIATRIC: Denies depression, anxiety or mood swings.     PMHx:   Past Medical History:   Diagnosis Date    Chronic kidney disease, stage 3     Diabetes mellitus     Diabetes mellitus, type 2     Hypertension     Malnutrition of moderate degree 2020    UTI (urinary tract infection) 2020       Surgical hx:   Past Surgical History:   Procedure Laterality Date    ABDOMINAL SURGERY      CATHETERIZATION OF URETER Bilateral 2020    Procedure: CATHETERIZATION, URETER;  Surgeon: Kvng Cunningham MD;  Location: [facility] OR [address] [place] [address];  Service: Urology;  Laterality: Bilateral;     SECTION, CLASSIC      x2    CHOLECYSTECTOMY      CLOSURE, ILEOSTOMY, OPEN N/A 2021    Procedure: CLOSURE, ILEOSTOMY, OPEN, ERAS low;  Surgeon: Fabiana CLIFTON  MD Steffanie;  Location: [facility] OR 2ND FLR;  Service: Colon and Rectal;  Laterality: N/A;    COLON SURGERY  2015    [facility]    COLONOSCOPY N/A 6/4/2018    Procedure: COLONOSCOPY;  Surgeon: Gibran Aguayo MD;  Location: [facility] ENDO;  Service: Endoscopy;  Laterality: N/A;    COLOSTOMY Left 2015    CYSTOSCOPY N/A 7/31/2020    Procedure: CYSTOSCOPY;  Surgeon: Kvng Cunningham MD;  Location: [facility] OR [facility] FLR;  Service: Urology;  Laterality: N/A;    CYSTOSCOPY WITH URETEROSCOPY, RETROGRADE PYELOGRAPHY, AND INSERTION OF STENT Left 2/22/2019    Procedure: CYSTOSCOPY, WITH RETROGRADE PYELOGRAM AND URETERAL STENT INSERTION with placement of a muir cath;  Surgeon: Ulisses Horton MD;  Location: [facility] OR;  Service: General;  Laterality: Left;    FLEXIBLE SIGMOIDOSCOPY N/A 2/9/2021    Procedure: SIGMOIDOSCOPY, FLEXIBLE;  Surgeon: Fabiana Valiente MD;  [facility];  Service: Colon and Rectal;  Laterality: N/A;    HYSTEROSCOPY WITH DILATION AND CURETTAGE OF UTERUS N/A 12/9/2020    Procedure: HYSTEROSCOPY, WITH DILATION AND CURETTAGE OF UTERUS (MYOSURE);  Surgeon: Caitlyn Feng MD;  Location: [facility] OR;  Service: OB/GYN;  Laterality: N/A;  Guido notified 11/30, EF  video    ILEOSTOMY  7/31/2020    Procedure: CREATION, ILEOSTOMY;  Surgeon: Fabiana Valiente MD;  [facility];  Service: Colon and Rectal;;    INCISION AND DRAINAGE OF ABSCESS N/A 12/22/2019    Procedure: INCISION AND DRAINAGE, ABSCESS;  Surgeon: Ulisses Horton MD;  Location: [facility] OR;  Service: General;  Laterality: N/A;    INCISION OF ABDOMINAL WALL N/A 8/10/2018    Procedure: INCISION, ABDOMINAL WALL;  Surgeon: Ulisses Horton MD;  Location: [facility] OR;  Service: General;  Laterality: N/A;    INCISIONAL HERNIA REPAIR Right 2010    LOW ANTERIOR RESECTION OF COLON N/A 7/31/2020    Procedure: RESECTION, COLON, LOW ANTERIOR;  Surgeon: Fabiana Valiente MD;  [facility];  Service: Colon and Rectal;   Laterality: N/A;    LYSIS OF ADHESIONS  2020    Procedure: LYSIS, ADHESIONS;  Surgeon: Fabiana Valiente MD;  Location: [facility] OR [address] [place] [address];  Service: Colon and Rectal;;       GYNhx: No LMP recorded (lmp unknown). Patient is postmenopausal.    Obhx:     Review of patient's allergies indicates:   Allergen Reactions    Chlorhexidine gluconate Rash     Chlorhexidine/alcohol wipes. Rash and blistering.       MEDS: Reviewed, reconciled      Current Outpatient Medications:     CARVEDILOL PHOSPHATE 20 MG ORAL CM24 (COREG CR) 20 mg 24 hr capsule, , Disp: , Rfl:     CHOLESTYRAMINE LIGHT 4 gram packet, Take 1 packet by mouth 2 (two) times daily., Disp: , Rfl:     ciclopirox (PENLAC) 8 % Soln, Apply topically., Disp: , Rfl:     folic acid/multivit-min/lutein (CENTRUM SILVER ORAL), Take by mouth once daily., Disp: , Rfl:     hydroCHLOROthiazide (HYDRODIURIL) 25 MG tablet, Take 1 tablet (25 mg total) by mouth once daily., Disp: 30 tablet, Rfl: 3    medroxyPROGESTERone (PROVERA) 10 MG tablet, Take 1 tablet (10 mg total) by mouth once daily., Disp: 30 tablet, Rfl: 11    metFORMIN (GLUCOPHAGE) 1000 MG tablet, Take 1,000 mg by mouth 2 (two) times daily with meals., Disp: , Rfl:     ondansetron (ZOFRAN-ODT) 4 MG TbDL, Take by mouth., Disp: , Rfl:     TRUE METRIX GLUCOSE TEST STRIP Strp, , Disp: , Rfl:     TRUEPLUS LANCETS 33 gauge Misc, , Disp: , Rfl:     Social hx:    Social History     Socioeconomic History    Marital status: Single   Tobacco Use    Smoking status: Former Smoker     Types: Cigarettes     Quit date: 2000     Years since quittin.1    Smokeless tobacco: Never Used   Substance and Sexual Activity    Alcohol use: No    Drug use: No    Sexual activity: Not Currently       Family hx:    Family History   Problem Relation Age of Onset    Stroke Mother     Heart disease Mother     Hyperlipidemia Mother     Hypertension Mother     Alcohol abuse Father     Stroke Sister     Diabetes  Sister     Heart disease Sister     Hyperlipidemia Sister     Hypertension Sister     Diabetes Brother     Early death Grandchild     Anesthesia problems Neg Hx     Broken bones Neg Hx     Cancer Neg Hx        PE:   Vitals: BP (!) 132/90   Wt 74.6 kg (164 lb 5.7 oz)   LMP  (LMP Unknown)   BMI 28.21 kg/m²   APPEARANCE: Well nourished, well developed, in no acute distress.  SKIN: Normal skin turgor, no lesions.  CHEST: Lungs clear to auscultation.  HEART: Regular rate and rhythm, no murmurs, rubs or gallops.  ABDOMEN: Soft. No tenderness or masses. No hepatosplenomegaly. No hernias.  PELVIC: deferred  EXTREMITIES: No clubbing cyanosis or edema.      A/P: Azucena Morrison is a 71 y.o. female who presents for preop evaluation.      1) Surgery:   -Risks and benefits of surgery discussed with the patient.  All questions were answered.  Consents for surgery and blood were signed by the patient today. discussed risks including risk of perforation or bigger surgery than we anticipate.   -Patient has been instructed to be NPO on night prior to procedure  -Preop labs ordered: cbc, cmp and type and screen     -postop visit scheduled 2 weeks     -put in call to PCP as she mentioned that her PCP wanted her to see cardiology for fatigue that she was having, trying to reach him to make sure this isn't something that she should have done before surgery. Voice mail left     Patient to proceed now immediately to preop

## 2022-02-04 ENCOUNTER — OFFICE VISIT (OUTPATIENT)
Dept: CARDIOLOGY | Facility: CLINIC | Age: 72
End: 2022-02-04
Payer: MEDICARE

## 2022-02-04 ENCOUNTER — TELEPHONE (OUTPATIENT)
Dept: OBSTETRICS AND GYNECOLOGY | Facility: HOSPITAL | Age: 72
End: 2022-02-04
Payer: MEDICARE

## 2022-02-04 VITALS
SYSTOLIC BLOOD PRESSURE: 169 MMHG | WEIGHT: 166.69 LBS | HEART RATE: 84 BPM | OXYGEN SATURATION: 98 % | BODY MASS INDEX: 28.61 KG/M2 | DIASTOLIC BLOOD PRESSURE: 96 MMHG

## 2022-02-04 DIAGNOSIS — I10 ESSENTIAL HYPERTENSION: Primary | ICD-10-CM

## 2022-02-04 DIAGNOSIS — R06.02 SOB (SHORTNESS OF BREATH): ICD-10-CM

## 2022-02-04 DIAGNOSIS — R06.09 DOE (DYSPNEA ON EXERTION): ICD-10-CM

## 2022-02-04 DIAGNOSIS — Z01.818 PREOPERATIVE CLEARANCE: ICD-10-CM

## 2022-02-04 DIAGNOSIS — E11.9 TYPE 2 DIABETES MELLITUS WITHOUT COMPLICATION, WITH LONG-TERM CURRENT USE OF INSULIN: Chronic | ICD-10-CM

## 2022-02-04 DIAGNOSIS — N18.31 STAGE 3A CHRONIC KIDNEY DISEASE: Chronic | ICD-10-CM

## 2022-02-04 DIAGNOSIS — R07.9 CHEST PAIN OF UNCERTAIN ETIOLOGY: ICD-10-CM

## 2022-02-04 DIAGNOSIS — E78.00 PURE HYPERCHOLESTEROLEMIA: ICD-10-CM

## 2022-02-04 DIAGNOSIS — Z79.4 TYPE 2 DIABETES MELLITUS WITHOUT COMPLICATION, WITH LONG-TERM CURRENT USE OF INSULIN: Chronic | ICD-10-CM

## 2022-02-04 DIAGNOSIS — E78.2 MIXED HYPERLIPIDEMIA: ICD-10-CM

## 2022-02-04 PROCEDURE — 1159F MED LIST DOCD IN RCRD: CPT | Mod: CPTII,S$GLB,, | Performed by: INTERNAL MEDICINE

## 2022-02-04 PROCEDURE — 99204 OFFICE O/P NEW MOD 45 MIN: CPT | Mod: S$GLB,,, | Performed by: INTERNAL MEDICINE

## 2022-02-04 PROCEDURE — 1126F AMNT PAIN NOTED NONE PRSNT: CPT | Mod: CPTII,S$GLB,, | Performed by: INTERNAL MEDICINE

## 2022-02-04 PROCEDURE — 99204 PR OFFICE/OUTPT VISIT, NEW, LEVL IV, 45-59 MIN: ICD-10-PCS | Mod: S$GLB,,, | Performed by: INTERNAL MEDICINE

## 2022-02-04 PROCEDURE — 99999 PR PBB SHADOW E&M-EST. PATIENT-LVL III: ICD-10-PCS | Mod: PBBFAC,,, | Performed by: INTERNAL MEDICINE

## 2022-02-04 PROCEDURE — 99999 PR PBB SHADOW E&M-EST. PATIENT-LVL III: CPT | Mod: PBBFAC,,, | Performed by: INTERNAL MEDICINE

## 2022-02-04 PROCEDURE — 3077F PR MOST RECENT SYSTOLIC BLOOD PRESSURE >= 140 MM HG: ICD-10-PCS | Mod: CPTII,S$GLB,, | Performed by: INTERNAL MEDICINE

## 2022-02-04 PROCEDURE — 3077F SYST BP >= 140 MM HG: CPT | Mod: CPTII,S$GLB,, | Performed by: INTERNAL MEDICINE

## 2022-02-04 PROCEDURE — 1160F RVW MEDS BY RX/DR IN RCRD: CPT | Mod: CPTII,S$GLB,, | Performed by: INTERNAL MEDICINE

## 2022-02-04 PROCEDURE — 3008F BODY MASS INDEX DOCD: CPT | Mod: CPTII,S$GLB,, | Performed by: INTERNAL MEDICINE

## 2022-02-04 PROCEDURE — 1159F PR MEDICATION LIST DOCUMENTED IN MEDICAL RECORD: ICD-10-PCS | Mod: CPTII,S$GLB,, | Performed by: INTERNAL MEDICINE

## 2022-02-04 PROCEDURE — 1160F PR REVIEW ALL MEDS BY PRESCRIBER/CLIN PHARMACIST DOCUMENTED: ICD-10-PCS | Mod: CPTII,S$GLB,, | Performed by: INTERNAL MEDICINE

## 2022-02-04 PROCEDURE — 3080F PR MOST RECENT DIASTOLIC BLOOD PRESSURE >= 90 MM HG: ICD-10-PCS | Mod: CPTII,S$GLB,, | Performed by: INTERNAL MEDICINE

## 2022-02-04 PROCEDURE — 1126F PR PAIN SEVERITY QUANTIFIED, NO PAIN PRESENT: ICD-10-PCS | Mod: CPTII,S$GLB,, | Performed by: INTERNAL MEDICINE

## 2022-02-04 PROCEDURE — 3008F PR BODY MASS INDEX (BMI) DOCUMENTED: ICD-10-PCS | Mod: CPTII,S$GLB,, | Performed by: INTERNAL MEDICINE

## 2022-02-04 PROCEDURE — 3080F DIAST BP >= 90 MM HG: CPT | Mod: CPTII,S$GLB,, | Performed by: INTERNAL MEDICINE

## 2022-02-04 RX ORDER — HYDROCHLOROTHIAZIDE 25 MG/1
25 TABLET ORAL DAILY
Qty: 90 TABLET | Refills: 3 | Status: SHIPPED | OUTPATIENT
Start: 2022-02-04

## 2022-02-04 RX ORDER — CARVEDILOL PHOSPHATE 40 MG/1
40 CAPSULE, EXTENDED RELEASE ORAL DAILY
Qty: 90 CAPSULE | Refills: 3 | Status: SHIPPED | OUTPATIENT
Start: 2022-02-04 | End: 2022-12-06 | Stop reason: SDUPTHER

## 2022-02-04 RX ORDER — METOPROLOL SUCCINATE 25 MG/1
25 TABLET, EXTENDED RELEASE ORAL DAILY
Qty: 90 TABLET | Refills: 3 | Status: SHIPPED | OUTPATIENT
Start: 2022-02-04 | End: 2022-02-04

## 2022-02-04 NOTE — PROGRESS NOTES
[facility] Cardiology     Subjective:    Patient ID:  Azucena Morrison is a 71 y.o. female who presents for evaluation of Hypertension, Hyperlipidemia, Diabetes Mellitus, and Pre-op Exam    Review of patient's allergies indicates:   Allergen Reactions    Chlorhexidine gluconate Rash     Chlorhexidine/alcohol wipes. Rash and blistering.      The patient needs surgical clearance for gynecologic surgical procedure for next week.  It sounds like an endometrial biopsy.  There has been dysfunctional bleeding in the past.  She has hypertension and diabetes.  I do see labs showing elevated triglyceride levels.  She is on carvedilol and hydrochlorothiazide for hypertension.  Her blood pressures are not ideally controlled on a regular basis she states.  She is compliant with her medications.  She does not exercise very much on a regular basis.    She reported shortness of breath for about a week to her PCP.  A referral was advised preoperatively.  She states that she is under a lot of stress because of family problems back home.  She thinks this could be playing into stress and anxiety causing her symptoms.  She has also had some right subcostal pain intermittently as well.  Her blood pressure is elevated elevated today.    In 2016 she had an echo showing normal ejection fraction.  There was mild aortic valve sclerosis.  There was no pulmonary hypertension.  Her last electrocardiogram was a year ago.  It showed rare PACs.  She is not a smoker.  A year ago she had ileostomy closure surgery and did well cardiac wise.  There have been other surgical procedures in the past.  Her ileostomy surgery was related to previous therapy for what appears to be diverticulitis.  Intra-abdominal abscess reported.  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  Multiple procedures and evaluations have been required in the past.    The patient's 12  lead electrocardiogram reviewed and independently interpreted by myself today confirms normal sinus rhythm left axis deviation probable LVH septal infarct pattern but no ischemic ST changes.      Review of Systems   Constitutional: Negative for chills, decreased appetite, diaphoresis, fever, malaise/fatigue, night sweats, weight gain and weight loss.   HENT: Negative for congestion, ear discharge, ear pain, hearing loss, hoarse voice, nosebleeds, odynophagia, sore throat, stridor and tinnitus.    Eyes: Negative for blurred vision, discharge, double vision, pain, photophobia, redness, vision loss in left eye, vision loss in right eye, visual disturbance and visual halos.   Cardiovascular: Positive for chest pain and dyspnea on exertion. Negative for claudication, cyanosis, irregular heartbeat, leg swelling, near-syncope, orthopnea, palpitations, paroxysmal nocturnal dyspnea and syncope.   Respiratory: Positive for shortness of breath. Negative for cough, hemoptysis, sleep disturbances due to breathing, snoring, sputum production and wheezing.    Endocrine: Negative for cold intolerance, heat intolerance, polydipsia, polyphagia and polyuria.   Hematologic/Lymphatic: Negative for adenopathy and bleeding problem. Does not bruise/bleed easily.   Skin: Negative for color change, dry skin, flushing, itching, nail changes, poor wound healing, rash, skin cancer, suspicious lesions and unusual hair distribution.   Musculoskeletal: Negative for arthritis, back pain, falls, gout, joint pain, joint swelling, muscle cramps, muscle weakness, myalgias, neck pain and stiffness.   Gastrointestinal: Negative for bloating, abdominal pain, anorexia, change in bowel habit, bowel incontinence, constipation, diarrhea, dysphagia, excessive appetite, flatus, heartburn, hematemesis, hematochezia, hemorrhoids, jaundice, melena, nausea and vomiting.   Genitourinary: Negative for bladder incontinence, decreased libido, dysuria, flank pain,  frequency, genital sores, hematuria, hesitancy, incomplete emptying, nocturia and urgency.   Neurological: Negative for aphonia, brief paralysis, difficulty with concentration, disturbances in coordination, excessive daytime sleepiness, dizziness, focal weakness, headaches, light-headedness, loss of balance, numbness, paresthesias, seizures, sensory change, tremors, vertigo and weakness.   Psychiatric/Behavioral: Negative for altered mental status, depression, hallucinations, memory loss, substance abuse, suicidal ideas and thoughts of violence. The patient is nervous/anxious. The patient does not have insomnia.    Allergic/Immunologic: Negative for hives and persistent infections.        Objective:       Vitals:    02/04/22 1034   BP: (!) 169/96   Pulse: 84   SpO2: 98%   Weight: 75.6 kg (166 lb 10.7 oz)    Physical Exam  Constitutional:       General: She is not in acute distress.     Appearance: She is well-developed. She is not diaphoretic.   HENT:      Head: Normocephalic and atraumatic.      Nose: Nose normal.   Eyes:      General: No scleral icterus.        Right eye: No discharge.      Conjunctiva/sclera: Conjunctivae normal.      Pupils: Pupils are equal, round, and reactive to light.   Neck:      Thyroid: No thyromegaly.      Vascular: No JVD.      Trachea: No tracheal deviation.   Cardiovascular:      Rate and Rhythm: Normal rate and regular rhythm.      Pulses:           Carotid pulses are 2+ on the right side and 2+ on the left side.       Radial pulses are 2+ on the right side and 2+ on the left side.        Dorsalis pedis pulses are 2+ on the right side and 2+ on the left side.        Posterior tibial pulses are 2+ on the right side and 2+ on the left side.      Heart sounds: Normal heart sounds. No murmur heard.  No friction rub. No gallop.    Pulmonary:      Effort: Pulmonary effort is normal. No respiratory distress.      Breath sounds: Normal breath sounds. No stridor. No wheezing or rales.    Chest:      Chest wall: No tenderness.   Abdominal:      General: Bowel sounds are normal. There is no distension.      Palpations: Abdomen is soft. There is no mass.      Tenderness: There is no abdominal tenderness. There is no guarding or rebound.   Musculoskeletal:         General: No tenderness. Normal range of motion.      Cervical back: Normal range of motion and neck supple.   Lymphadenopathy:      Cervical: No cervical adenopathy.   Skin:     General: Skin is warm and dry.      Coloration: Skin is not pale.      Findings: No erythema or rash.   Neurological:      Mental Status: She is alert and oriented to person, place, and time.      Cranial Nerves: No cranial nerve deficit.      Coordination: Coordination normal.   Psychiatric:         Behavior: Behavior normal.         Thought Content: Thought content normal.         Judgment: Judgment normal.           Assessment:       1. Essential hypertension    2. SOB (shortness of breath)    3. Pure hypercholesterolemia    4. Preoperative clearance    5. Stage 3a chronic kidney disease    6. Type 2 diabetes mellitus without complication, with long-term current use of insulin    7. Mixed hyperlipidemia    8. Chest pain of uncertain etiology    9. GRACE (dyspnea on exertion)      Results for orders placed or performed in visit on 01/10/22   SARS Coronavirus 2 Antigen, POCT Manual Read   Result Value Ref Range    SARS Coronavirus 2 Antigen Negative Negative     Acceptable Yes          Current Outpatient Medications:     carvedilol PHOSPHATE 40 MG ORAL CM24 (COREG CR) 40 MG CM24, Take 1 capsule (40 mg total) by mouth once daily., Disp: 90 capsule, Rfl: 3    CHOLESTYRAMINE LIGHT 4 gram packet, Take 1 packet by mouth 2 (two) times daily., Disp: , Rfl:     ciclopirox (PENLAC) 8 % Soln, Apply topically., Disp: , Rfl:     folic acid/multivit-min/lutein (CENTRUM SILVER ORAL), Take by mouth once daily., Disp: , Rfl:     hydroCHLOROthiazide (HYDRODIURIL)  25 MG tablet, Take 1 tablet (25 mg total) by mouth once daily., Disp: 90 tablet, Rfl: 3    medroxyPROGESTERone (PROVERA) 10 MG tablet, Take 1 tablet (10 mg total) by mouth once daily., Disp: 30 tablet, Rfl: 11    metFORMIN (GLUCOPHAGE) 1000 MG tablet, Take 1,000 mg by mouth 2 (two) times daily with meals., Disp: , Rfl:     ondansetron (ZOFRAN-ODT) 4 MG TbDL, Take by mouth., Disp: , Rfl:     TRUE METRIX GLUCOSE TEST STRIP Strp, , Disp: , Rfl:     TRUEPLUS LANCETS 33 gauge Misc, , Disp: , Rfl:      Lab Results   Component Value Date    WBC 10.09 04/09/2021    RBC 4.35 04/09/2021    HGB 12.1 04/09/2021    HCT 36.5 (L) 04/09/2021    MCV 84 04/09/2021    MCH 27.8 04/09/2021    MCHC 33.2 04/09/2021    RDW 14.1 04/09/2021     04/09/2021    MPV 8.2 (L) 04/09/2021    GRAN 7.2 04/09/2021    GRAN 71.8 04/09/2021    LYMPH 2.1 04/09/2021    LYMPH 20.8 04/09/2021    MONO 0.6 04/09/2021    MONO 5.6 04/09/2021    EOS 0.1 04/09/2021    BASO 0.04 04/09/2021    EOSINOPHIL 1.2 04/09/2021    BASOPHIL 0.4 04/09/2021    MG 1.5 (L) 02/10/2021        CMP  Lab Results   Component Value Date     (L) 04/09/2021    K 4.2 04/09/2021     04/09/2021    CO2 21 (L) 04/09/2021     (H) 04/09/2021    BUN 16 04/09/2021    CREATININE 1.1 04/09/2021    CALCIUM 9.7 04/09/2021    PROT 8.7 (H) 03/26/2021    ALBUMIN 3.2 (L) 03/26/2021    BILITOT 0.6 03/26/2021    ALKPHOS 90 03/26/2021    AST 12 03/26/2021    ALT 10 03/26/2021    ANIONGAP 7 (L) 04/09/2021    ESTGFRAFRICA 59.2 (A) 04/09/2021    EGFRNONAA 51.3 (A) 04/09/2021        Lab Results   Component Value Date    LABBLOO No growth after 5 days. 08/05/2020    LABURIN ESCHERICHIA COLI ESBL  >100,000 cfu/ml   (A) 08/05/2020            Results for orders placed or performed during the hospital encounter of 10/02/20   EKG 12-lead    Collection Time: 10/02/20  3:28 PM    Narrative    Test Reason : E87.6,    Vent. Rate : 087 BPM     Atrial Rate : 087 BPM     P-R Int : 132 ms           QRS Dur : 080 ms      QT Int : 404 ms       P-R-T Axes : 069 -27 056 degrees     QTc Int : 486 ms    normal sinus rhtyjm with premature ventricular complexes   Otherwise normal ECG  When compared with ECG of 14-JUL-2020 15:25,  PVC's noted   Confirmed by Fabi Rocha MD ([id number]) on 10/11/2020 5:06:39 PM    Referred By: [email or web address]   SELF           Confirmed By:Fabi Rocha MD                  Plan:       Problem List Items Addressed This Visit        Cardiac/Vascular    Essential hypertension - Primary (Chronic)     Her blood pressures have been elevated.  I am increasing her carvedilol CR dosing from 20-40 mg daily.  Additional therapy may be required.  She is on hydrodiuril.  ACE-inhibitor therapy warranted.  She has 1+ proteinuria on urinalysis, no recent hemoglobin A1c charted.         Relevant Medications    hydroCHLOROthiazide (HYDRODIURIL) 25 MG tablet    Mixed hyperlipidemia     A full laboratory profile is ordered today.  She has elevated triglycerides.  She is currently not on statin therapy.            Renal/    Chronic kidney disease, stage 3 (Chronic)     Condition stable.  GFR 51 range.  She is diabetic.            Endocrine    Type 2 diabetes mellitus without complication, with long-term current use of insulin (Chronic)      ACE-inhibitor therapy warranted.  She has 1+ proteinuria on urinalysis, no recent hemoglobin A1c charted.              Other    Preoperative clearance     A Lexiscan stress test is ordered given complaints of chest pain shortness of breath.  Further recommendations pending results.  Management discussed with Dr. Feng, gynecology.  There is no urgency to the surgical procedure.  Subsequent clearance will be discussed.         Chest pain of uncertain etiology     Stress test ordered.  Further recommendations to follow.  For now her follow-up is as needed.  My suspicion for angina triggering her chest pain complaints currently is rather low.  There is no classic  exertion-relationship to her symptoms currently.         GRACE (dyspnea on exertion)     An echo in 2016 confirmed normal ejection fraction with LVH.  She does not look short of breath in my office.  Her resting Electrocardiogram shows no ischemic ST changes.           Other Visit Diagnoses     SOB (shortness of breath)        Relevant Orders    NM Myocardial Perfusion Spect Multi Pharmacologic    Nuclear Stress Test    EKG 12-lead    Pure hypercholesterolemia        Relevant Orders    Lipid Panel                   Thank you for allowing me to participate in your patient's care.  I will give her phone reports of stress test.  She prefers to follow up with Cardiology on an as-needed basis.      Reassessment of cholesterol profile, consider adding Ace/ARB for better blood pressure control and diabetic kidney prophylaxis.            Addendum 2/9/2022:  The patient's nuclear stress test showed normal perfusion study.  Her ejection fraction was calculated at 39%, possible false low reading.  I am ordered an echo for completeness sake.  Findings discussed with gynecology and the patient.      Her triglycerides are elevated but total cholesterol below 200. Given stress test findings I do not mandate therapy at this time but fish oil therapy to be considered in the future if it remains persistently elevated.  I will give her phone reports of the echo.  For now she can follow-up with me on an as-needed basis.    She is cleared cardiac wise at this time for upcoming biopsy.       Mannie Schilling MD  02/07/2022   11:07 AM

## 2022-02-04 NOTE — TRANSFER OF CARE
"Anesthesia Transfer of Care Note    Patient: Azucena Bejaranoendez    Procedure(s) Performed: Procedure(s) (LRB):  COLONOSCOPY (N/A)    Patient location: GI    Anesthesia Type: MAC    Transport from OR: Transported from OR on room air with adequate spontaneous ventilation    Post pain: adequate analgesia    Post assessment: no apparent anesthetic complications and tolerated procedure well    Post vital signs: stable    Level of consciousness: awake, alert and oriented    Nausea/Vomiting: no nausea/vomiting    Complications: none    Transfer of care protocol was followed      Last vitals:   Visit Vitals  BP (!) 151/84   Pulse 94   Temp 36.8 °C (98.2 °F)   Resp 15   Ht 5' 3" (1.6 m)   Wt 76.7 kg (169 lb)   SpO2 98%   Breastfeeding? No   BMI 29.94 kg/m²     " Detail Level: Detailed

## 2022-02-04 NOTE — TELEPHONE ENCOUNTER
Spoke with her cardiologist today who recommended getting a stress test before he would recommend proceeding with surgery. Called patient with language line and patient reports she is having stress test on Monday. Discussed with her will see what the results are from the test and we can cancel/move it if the cardiologist recommends after seeing the results.

## 2022-02-07 ENCOUNTER — HOSPITAL ENCOUNTER (OUTPATIENT)
Dept: CARDIOLOGY | Facility: HOSPITAL | Age: 72
Discharge: HOME | End: 2022-02-07
Attending: INTERNAL MEDICINE
Payer: MEDICARE

## 2022-02-07 ENCOUNTER — HOSPITAL ENCOUNTER (OUTPATIENT)
Dept: RADIOLOGY | Facility: HOSPITAL | Age: 72
Discharge: HOME | End: 2022-02-07
Attending: INTERNAL MEDICINE
Payer: MEDICARE

## 2022-02-07 DIAGNOSIS — R06.02 SOB (SHORTNESS OF BREATH): ICD-10-CM

## 2022-02-07 PROBLEM — R07.9 CHEST PAIN OF UNCERTAIN ETIOLOGY: Status: ACTIVE | Noted: 2022-02-07

## 2022-02-07 PROBLEM — R06.09 DOE (DYSPNEA ON EXERTION): Status: ACTIVE | Noted: 2022-02-07

## 2022-02-07 PROBLEM — E78.2 MIXED HYPERLIPIDEMIA: Status: ACTIVE | Noted: 2022-02-07

## 2022-02-07 PROBLEM — Z01.818 PREOPERATIVE CLEARANCE: Status: ACTIVE | Noted: 2022-02-07

## 2022-02-07 LAB
CV STRESS BASE HR: 71 BPM
DIASTOLIC BLOOD PRESSURE: 104 MMHG
OHS CV CPX 85 PERCENT MAX PREDICTED HEART RATE MALE: 123
OHS CV CPX MAX PREDICTED HEART RATE: 144
OHS CV CPX PATIENT IS FEMALE: 1
OHS CV CPX PATIENT IS MALE: 0
OHS CV CPX PEAK DIASTOLIC BLOOD PRESSURE: 104 MMHG
OHS CV CPX PEAK HEAR RATE: 104 BPM
OHS CV CPX PEAK RATE PRESSURE PRODUCT: NORMAL
OHS CV CPX PEAK SYSTOLIC BLOOD PRESSURE: 191 MMHG
OHS CV CPX PERCENT MAX PREDICTED HEART RATE ACHIEVED: 72
OHS CV CPX RATE PRESSURE PRODUCT PRESENTING: NORMAL
SYSTOLIC BLOOD PRESSURE: 191 MMHG

## 2022-02-07 PROCEDURE — 78452 NM MYOCARDIAL PERFUSION SPECT MULTI PHARM: ICD-10-PCS | Mod: 26,,, | Performed by: RADIOLOGY

## 2022-02-07 PROCEDURE — 93016 CV STRESS TEST SUPVJ ONLY: CPT | Mod: ,,, | Performed by: INTERNAL MEDICINE

## 2022-02-07 PROCEDURE — 78452 HT MUSCLE IMAGE SPECT MULT: CPT | Mod: 26,,, | Performed by: RADIOLOGY

## 2022-02-07 PROCEDURE — 63600175 PHARM REV CODE 636 W HCPCS: Performed by: INTERNAL MEDICINE

## 2022-02-07 PROCEDURE — 93018 CV STRESS TEST I&R ONLY: CPT | Mod: ,,, | Performed by: INTERNAL MEDICINE

## 2022-02-07 PROCEDURE — 93017 CV STRESS TEST TRACING ONLY: CPT

## 2022-02-07 PROCEDURE — A9502 TC99M TETROFOSMIN: HCPCS

## 2022-02-07 PROCEDURE — 93016 NUCLEAR STRESS TEST (CUPID ONLY): ICD-10-PCS | Mod: ,,, | Performed by: INTERNAL MEDICINE

## 2022-02-07 PROCEDURE — 93018 NUCLEAR STRESS TEST (CUPID ONLY): ICD-10-PCS | Mod: ,,, | Performed by: INTERNAL MEDICINE

## 2022-02-07 RX ORDER — REGADENOSON 0.08 MG/ML
0.4 INJECTION, SOLUTION INTRAVENOUS ONCE
Status: COMPLETED | OUTPATIENT
Start: 2022-02-07 | End: 2022-02-07

## 2022-02-07 RX ADMIN — REGADENOSON 0.4 MG: 0.08 INJECTION, SOLUTION INTRAVENOUS at 12:02

## 2022-02-07 NOTE — ASSESSMENT & PLAN NOTE
An echo in 2016 confirmed normal ejection fraction with LVH.  She does not look short of breath in my office.  Her resting Electrocardiogram shows no ischemic ST changes.

## 2022-02-07 NOTE — ASSESSMENT & PLAN NOTE
A Lexiscan stress test is ordered given complaints of chest pain shortness of breath.  Further recommendations pending results.  Management discussed with Dr. Feng, gynecology.  There is no urgency to the surgical procedure.  Subsequent clearance will be discussed.

## 2022-02-07 NOTE — ASSESSMENT & PLAN NOTE
Stress test ordered.  Further recommendations to follow.  For now her follow-up is as needed.  My suspicion for angina triggering her chest pain complaints currently is rather low.  There is no classic exertion-relationship to her symptoms currently.

## 2022-02-07 NOTE — ASSESSMENT & PLAN NOTE
ACE-inhibitor therapy warranted.  She has 1+ proteinuria on urinalysis, no recent hemoglobin A1c charted.

## 2022-02-07 NOTE — ASSESSMENT & PLAN NOTE
A full laboratory profile is ordered today.  She has elevated triglycerides.  She is currently not on statin therapy.

## 2022-02-07 NOTE — ASSESSMENT & PLAN NOTE
Her blood pressures have been elevated.  I am increasing her carvedilol CR dosing from 20-40 mg daily.  Additional therapy may be required.  She is on hydrodiuril.  ACE-inhibitor therapy warranted.  She has 1+ proteinuria on urinalysis, no recent hemoglobin A1c charted.

## 2022-02-08 ENCOUNTER — ANESTHESIA EVENT (OUTPATIENT)
Dept: SURGERY | Facility: HOSPITAL | Age: 72
End: 2022-02-08
Payer: MEDICARE

## 2022-02-08 ENCOUNTER — LAB VISIT (OUTPATIENT)
Dept: LAB | Facility: HOSPITAL | Age: 72
End: 2022-02-08
Attending: OBSTETRICS & GYNECOLOGY
Payer: MEDICARE

## 2022-02-08 ENCOUNTER — TELEPHONE (OUTPATIENT)
Dept: OBSTETRICS AND GYNECOLOGY | Facility: CLINIC | Age: 72
End: 2022-02-08
Payer: MEDICARE

## 2022-02-08 DIAGNOSIS — Z01.818 PRE-OPERATIVE EXAM: ICD-10-CM

## 2022-02-08 DIAGNOSIS — E78.00 PURE HYPERCHOLESTEROLEMIA: ICD-10-CM

## 2022-02-08 LAB
ABO + RH BLD: NORMAL
ANION GAP SERPL CALC-SCNC: 10 MMOL/L (ref 8–16)
BASOPHILS # BLD AUTO: 0.03 K/UL (ref 0–0.2)
BASOPHILS NFR BLD: 0.5 % (ref 0–1.9)
BLD GP AB SCN CELLS X3 SERPL QL: NORMAL
BUN SERPL-MCNC: 23 MG/DL (ref 8–23)
CALCIUM SERPL-MCNC: 9.9 MG/DL (ref 8.7–10.5)
CHLORIDE SERPL-SCNC: 102 MMOL/L (ref 95–110)
CHOLEST SERPL-MCNC: 183 MG/DL (ref 120–199)
CHOLEST/HDLC SERPL: 6.1 {RATIO} (ref 2–5)
CO2 SERPL-SCNC: 24 MMOL/L (ref 23–29)
CREAT SERPL-MCNC: 1.2 MG/DL (ref 0.5–1.4)
DIFFERENTIAL METHOD: ABNORMAL
EOSINOPHIL # BLD AUTO: 0.1 K/UL (ref 0–0.5)
EOSINOPHIL NFR BLD: 1 % (ref 0–8)
ERYTHROCYTE [DISTWIDTH] IN BLOOD BY AUTOMATED COUNT: 13.3 % (ref 11.5–14.5)
EST. GFR  (AFRICAN AMERICAN): 53 ML/MIN/1.73 M^2
EST. GFR  (NON AFRICAN AMERICAN): 46 ML/MIN/1.73 M^2
GLUCOSE SERPL-MCNC: 174 MG/DL (ref 70–110)
HCT VFR BLD AUTO: 36.8 % (ref 37–48.5)
HDLC SERPL-MCNC: 30 MG/DL (ref 40–75)
HDLC SERPL: 16.4 % (ref 20–50)
HGB BLD-MCNC: 12.8 G/DL (ref 12–16)
IMM GRANULOCYTES # BLD AUTO: 0.02 K/UL (ref 0–0.04)
IMM GRANULOCYTES NFR BLD AUTO: 0.3 % (ref 0–0.5)
LDLC SERPL CALC-MCNC: ABNORMAL MG/DL (ref 63–159)
LYMPHOCYTES # BLD AUTO: 1.6 K/UL (ref 1–4.8)
LYMPHOCYTES NFR BLD: 27.3 % (ref 18–48)
MCH RBC QN AUTO: 28.3 PG (ref 27–31)
MCHC RBC AUTO-ENTMCNC: 34.8 G/DL (ref 32–36)
MCV RBC AUTO: 81 FL (ref 82–98)
MONOCYTES # BLD AUTO: 0.4 K/UL (ref 0.3–1)
MONOCYTES NFR BLD: 6.1 % (ref 4–15)
NEUTROPHILS # BLD AUTO: 3.8 K/UL (ref 1.8–7.7)
NEUTROPHILS NFR BLD: 64.8 % (ref 38–73)
NONHDLC SERPL-MCNC: 153 MG/DL
NRBC BLD-RTO: 0 /100 WBC
PLATELET # BLD AUTO: 316 K/UL (ref 150–450)
PMV BLD AUTO: 8.7 FL (ref 9.2–12.9)
POTASSIUM SERPL-SCNC: 3.6 MMOL/L (ref 3.5–5.1)
RBC # BLD AUTO: 4.52 M/UL (ref 4–5.4)
SODIUM SERPL-SCNC: 136 MMOL/L (ref 136–145)
TRIGL SERPL-MCNC: 430 MG/DL (ref 30–150)
WBC # BLD AUTO: 5.89 K/UL (ref 3.9–12.7)

## 2022-02-08 PROCEDURE — 80048 BASIC METABOLIC PNL TOTAL CA: CPT | Performed by: OBSTETRICS & GYNECOLOGY

## 2022-02-08 PROCEDURE — 85025 COMPLETE CBC W/AUTO DIFF WBC: CPT | Performed by: OBSTETRICS & GYNECOLOGY

## 2022-02-08 PROCEDURE — 36415 COLL VENOUS BLD VENIPUNCTURE: CPT | Performed by: OBSTETRICS & GYNECOLOGY

## 2022-02-08 PROCEDURE — 86901 BLOOD TYPING SEROLOGIC RH(D): CPT | Performed by: OBSTETRICS & GYNECOLOGY

## 2022-02-08 PROCEDURE — 80061 LIPID PANEL: CPT | Performed by: INTERNAL MEDICINE

## 2022-02-08 NOTE — TELEPHONE ENCOUNTER
Cardiologist did not clear her for surgery tomorrow so will have to cancel and reschedule. Sent message to surgery scheduler, canceled with desk and called patient with language line and discussed with her.

## 2022-02-09 ENCOUNTER — TELEPHONE (OUTPATIENT)
Dept: CARDIOLOGY | Facility: CLINIC | Age: 72
End: 2022-02-09
Payer: MEDICARE

## 2022-02-09 ENCOUNTER — ANESTHESIA (OUTPATIENT)
Dept: SURGERY | Facility: HOSPITAL | Age: 72
End: 2022-02-09
Payer: MEDICARE

## 2022-02-09 DIAGNOSIS — I10 PRIMARY HYPERTENSION: Primary | ICD-10-CM

## 2022-02-09 NOTE — TELEPHONE ENCOUNTER
----- Message from Mannie Schilling MD sent at 2/9/2022  2:32 PM CST -----  Please help her arrange an echo.  I put it in the computer today.  She does not need any follow-up with me at this time.  I will give her phone reports.

## 2022-02-14 ENCOUNTER — HOSPITAL ENCOUNTER (OUTPATIENT)
Dept: CARDIOLOGY | Facility: HOSPITAL | Age: 72
Discharge: HOME | End: 2022-02-14
Attending: INTERNAL MEDICINE
Payer: MEDICARE

## 2022-02-14 VITALS — WEIGHT: 166 LBS | BODY MASS INDEX: 28.34 KG/M2 | HEIGHT: 64 IN

## 2022-02-14 DIAGNOSIS — I10 PRIMARY HYPERTENSION: ICD-10-CM

## 2022-02-14 PROCEDURE — 93306 TTE W/DOPPLER COMPLETE: CPT

## 2022-02-14 PROCEDURE — 93306 ECHO (CUPID ONLY): ICD-10-PCS | Mod: 26,,, | Performed by: INTERNAL MEDICINE

## 2022-02-14 PROCEDURE — 93306 TTE W/DOPPLER COMPLETE: CPT | Mod: 26,,, | Performed by: INTERNAL MEDICINE

## 2022-02-15 LAB
AORTIC ROOT ANNULUS: 2.5 CM
ASCENDING AORTA: 2.34 CM
AV INDEX (PROSTH): 0.92
AV MEAN GRADIENT: 5 MMHG
AV PEAK GRADIENT: 8 MMHG
AV VALVE AREA: 2.41 CM2
AV VELOCITY RATIO: 0.7
BSA FOR ECHO PROCEDURE: 1.84 M2
CV ECHO LV RWT: 0.47 CM
DOP CALC AO PEAK VEL: 1.42 M/S
DOP CALC AO VTI: 25.18 CM
DOP CALC LVOT AREA: 2.6 CM2
DOP CALC LVOT DIAMETER: 1.83 CM
DOP CALC LVOT PEAK VEL: 1 M/S
DOP CALC LVOT STROKE VOLUME: 60.73 CM3
DOP CALC MV VTI: 18.42 CM
DOP CALCLVOT PEAK VEL VTI: 23.1 CM
E WAVE DECELERATION TIME: 243.26 MSEC
E/A RATIO: 0.76
E/E' RATIO: 12.6 M/S
ECHO LV POSTERIOR WALL: 0.95 CM (ref 0.6–1.1)
EJECTION FRACTION: 65 %
FRACTIONAL SHORTENING: 38 % (ref 28–44)
INTERVENTRICULAR SEPTUM: 1.27 CM (ref 0.6–1.1)
IVRT: 93.24 MSEC
LA MAJOR: 5.04 CM
LA MINOR: 5.3 CM
LA WIDTH: 3.06 CM
LEFT ATRIUM SIZE: 3.38 CM
LEFT ATRIUM VOLUME INDEX MOD: 19.7 ML/M2
LEFT ATRIUM VOLUME INDEX: 25.1 ML/M2
LEFT ATRIUM VOLUME MOD: 35.67 CM3
LEFT ATRIUM VOLUME: 45.42 CM3
LEFT INTERNAL DIMENSION IN SYSTOLE: 2.52 CM (ref 2.1–4)
LEFT VENTRICLE DIASTOLIC VOLUME INDEX: 40.23 ML/M2
LEFT VENTRICLE DIASTOLIC VOLUME: 72.81 ML
LEFT VENTRICLE MASS INDEX: 84 G/M2
LEFT VENTRICLE SYSTOLIC VOLUME INDEX: 12.6 ML/M2
LEFT VENTRICLE SYSTOLIC VOLUME: 22.81 ML
LEFT VENTRICULAR INTERNAL DIMENSION IN DIASTOLE: 4.07 CM (ref 3.5–6)
LEFT VENTRICULAR MASS: 151.56 G
LV LATERAL E/E' RATIO: 12.6 M/S
LV SEPTAL E/E' RATIO: 12.6 M/S
MV A" WAVE DURATION": 9.42 MSEC
MV PEAK A VEL: 0.83 M/S
MV PEAK E VEL: 0.63 M/S
MV PEAK GRADIENT: 3 MMHG
MV STENOSIS PRESSURE HALF TIME: 70.54 MS
MV VALVE AREA BY CONTINUITY EQUATION: 3.3 CM2
MV VALVE AREA P 1/2 METHOD: 3.12 CM2
PISA MRMAX VEL: 0.04 M/S
PISA TR MAX VEL: 2.66 M/S
PULM VEIN S/D RATIO: 1.4
PV PEAK D VEL: 0.35 M/S
PV PEAK S VEL: 0.49 M/S
PV PEAK VELOCITY: 0.99 CM/S
RA MAJOR: 4.11 CM
RA PRESSURE: 3 MMHG
RA WIDTH: 2.42 CM
RIGHT VENTRICULAR END-DIASTOLIC DIMENSION: 2.21 CM
RV TISSUE DOPPLER FREE WALL SYSTOLIC VELOCITY 1 (APICAL 4 CHAMBER VIEW): 17.82 CM/S
STJ: 2.22 CM
TDI LATERAL: 0.05 M/S
TDI SEPTAL: 0.05 M/S
TDI: 0.05 M/S
TR MAX PG: 28 MMHG
TV REST PULMONARY ARTERY PRESSURE: 31 MMHG

## 2022-02-25 ENCOUNTER — TELEPHONE (OUTPATIENT)
Dept: OBSTETRICS AND GYNECOLOGY | Facility: CLINIC | Age: 72
End: 2022-02-25
Payer: MEDICARE

## 2022-02-25 NOTE — TELEPHONE ENCOUNTER
Language line:  [phone] Álvaro      Called pt to schedule an appointment and there was no answer.  ROSE

## 2022-02-25 NOTE — TELEPHONE ENCOUNTER
----- Message from Darby Mcintosh MA sent at 2/24/2022  6:11 PM CST -----    ----- Message -----  From: Cortney Peres  Sent: 2/24/2022   1:29 PM CST  To: Darby Mcintosh MA    Sending it in through here just incase you don't see the IM on teams

## 2022-03-02 ENCOUNTER — LAB VISIT (OUTPATIENT)
Dept: PRIMARY CARE CLINIC | Facility: CLINIC | Age: 72
End: 2022-03-02
Payer: MEDICARE

## 2022-03-02 DIAGNOSIS — Z01.818 PRE-OP TESTING: ICD-10-CM

## 2022-03-02 PROCEDURE — U0005 INFEC AGEN DETEC AMPLI PROBE: HCPCS | Performed by: OBSTETRICS & GYNECOLOGY

## 2022-03-02 PROCEDURE — U0003 INFECTIOUS AGENT DETECTION BY NUCLEIC ACID (DNA OR RNA); SEVERE ACUTE RESPIRATORY SYNDROME CORONAVIRUS 2 (SARS-COV-2) (CORONAVIRUS DISEASE [COVID-19]), AMPLIFIED PROBE TECHNIQUE, MAKING USE OF HIGH THROUGHPUT TECHNOLOGIES AS DESCRIBED BY CMS-2020-01-R: HCPCS | Performed by: OBSTETRICS & GYNECOLOGY

## 2022-03-03 LAB
SARS-COV-2 RNA RESP QL NAA+PROBE: NOT DETECTED
SARS-COV-2- CYCLE NUMBER: NORMAL

## 2022-03-04 ENCOUNTER — HOSPITAL ENCOUNTER (OUTPATIENT)
Facility: HOSPITAL | Age: 72
Discharge: HOME | End: 2022-03-04
Attending: OBSTETRICS & GYNECOLOGY | Admitting: OBSTETRICS & GYNECOLOGY
Payer: MEDICARE

## 2022-03-04 VITALS
HEART RATE: 89 BPM | DIASTOLIC BLOOD PRESSURE: 85 MMHG | HEIGHT: 64 IN | RESPIRATION RATE: 20 BRPM | TEMPERATURE: 98 F | WEIGHT: 160 LBS | BODY MASS INDEX: 27.31 KG/M2 | OXYGEN SATURATION: 97 % | SYSTOLIC BLOOD PRESSURE: 145 MMHG

## 2022-03-04 DIAGNOSIS — Z01.818 PRE-OPERATIVE EXAM: ICD-10-CM

## 2022-03-04 LAB
CTP QC/QA: YES
POCT GLUCOSE: 201 MG/DL (ref 70–110)
SARS-COV-2 AG RESP QL IA.RAPID: NEGATIVE

## 2022-03-04 PROCEDURE — C1782 MORCELLATOR: HCPCS | Performed by: OBSTETRICS & GYNECOLOGY

## 2022-03-04 PROCEDURE — 63600175 PHARM REV CODE 636 W HCPCS: Performed by: NURSE ANESTHETIST, CERTIFIED REGISTERED

## 2022-03-04 PROCEDURE — 71000033 HC RECOVERY, INTIAL HOUR: Performed by: OBSTETRICS & GYNECOLOGY

## 2022-03-04 PROCEDURE — 63600175 PHARM REV CODE 636 W HCPCS: Performed by: ANESTHESIOLOGY

## 2022-03-04 PROCEDURE — 25000003 PHARM REV CODE 250: Performed by: NURSE ANESTHETIST, CERTIFIED REGISTERED

## 2022-03-04 PROCEDURE — 58558 PR HYSTEROSCOPY,W/ENDO BX: ICD-10-PCS | Mod: ,,, | Performed by: OBSTETRICS & GYNECOLOGY

## 2022-03-04 PROCEDURE — 58558 HYSTEROSCOPY BIOPSY: CPT | Mod: ,,, | Performed by: OBSTETRICS & GYNECOLOGY

## 2022-03-04 PROCEDURE — 71000015 HC POSTOP RECOV 1ST HR: Performed by: OBSTETRICS & GYNECOLOGY

## 2022-03-04 PROCEDURE — 36000706: Performed by: OBSTETRICS & GYNECOLOGY

## 2022-03-04 PROCEDURE — 88305 TISSUE EXAM BY PATHOLOGIST: CPT | Mod: 26,,, | Performed by: PATHOLOGY

## 2022-03-04 PROCEDURE — 27201423 OPTIME MED/SURG SUP & DEVICES STERILE SUPPLY: Performed by: OBSTETRICS & GYNECOLOGY

## 2022-03-04 PROCEDURE — 25000003 PHARM REV CODE 250: Performed by: OBSTETRICS & GYNECOLOGY

## 2022-03-04 PROCEDURE — 37000008 HC ANESTHESIA 1ST 15 MINUTES: Performed by: OBSTETRICS & GYNECOLOGY

## 2022-03-04 PROCEDURE — 88305 TISSUE EXAM BY PATHOLOGIST: CPT | Performed by: PATHOLOGY

## 2022-03-04 PROCEDURE — 88305 TISSUE EXAM BY PATHOLOGIST: ICD-10-PCS | Mod: 26,,, | Performed by: PATHOLOGY

## 2022-03-04 PROCEDURE — 36000707: Performed by: OBSTETRICS & GYNECOLOGY

## 2022-03-04 PROCEDURE — 37000009 HC ANESTHESIA EA ADD 15 MINS: Performed by: OBSTETRICS & GYNECOLOGY

## 2022-03-04 RX ORDER — MIDAZOLAM HYDROCHLORIDE 1 MG/ML
INJECTION, SOLUTION INTRAMUSCULAR; INTRAVENOUS
Status: DISCONTINUED | OUTPATIENT
Start: 2022-03-04 | End: 2022-03-04

## 2022-03-04 RX ORDER — PHENYLEPHRINE HYDROCHLORIDE 10 MG/ML
INJECTION INTRAVENOUS
Status: DISCONTINUED | OUTPATIENT
Start: 2022-03-04 | End: 2022-03-04

## 2022-03-04 RX ORDER — SODIUM CHLORIDE 9 MG/ML
INJECTION, SOLUTION INTRAVENOUS CONTINUOUS
Status: DISCONTINUED | OUTPATIENT
Start: 2022-03-04 | End: 2022-03-04 | Stop reason: HOSPADM

## 2022-03-04 RX ORDER — DEXAMETHASONE SODIUM PHOSPHATE 4 MG/ML
INJECTION, SOLUTION INTRA-ARTICULAR; INTRALESIONAL; INTRAMUSCULAR; INTRAVENOUS; SOFT TISSUE
Status: DISCONTINUED | OUTPATIENT
Start: 2022-03-04 | End: 2022-03-04

## 2022-03-04 RX ORDER — SILVER NITRATE 38.21; 12.74 MG/1; MG/1
STICK TOPICAL
Status: DISCONTINUED | OUTPATIENT
Start: 2022-03-04 | End: 2022-03-04 | Stop reason: HOSPADM

## 2022-03-04 RX ORDER — FENTANYL CITRATE 50 UG/ML
INJECTION, SOLUTION INTRAMUSCULAR; INTRAVENOUS
Status: DISCONTINUED | OUTPATIENT
Start: 2022-03-04 | End: 2022-03-04

## 2022-03-04 RX ORDER — SODIUM CHLORIDE 0.9 % (FLUSH) 0.9 %
10 SYRINGE (ML) INJECTION
Status: DISCONTINUED | OUTPATIENT
Start: 2022-03-04 | End: 2022-03-04 | Stop reason: HOSPADM

## 2022-03-04 RX ORDER — ONDANSETRON 2 MG/ML
4 INJECTION INTRAMUSCULAR; INTRAVENOUS ONCE AS NEEDED
Status: DISCONTINUED | OUTPATIENT
Start: 2022-03-04 | End: 2022-03-04 | Stop reason: HOSPADM

## 2022-03-04 RX ORDER — MUPIROCIN 20 MG/G
OINTMENT TOPICAL
Status: DISCONTINUED | OUTPATIENT
Start: 2022-03-04 | End: 2022-03-04 | Stop reason: HOSPADM

## 2022-03-04 RX ORDER — PROPOFOL 10 MG/ML
VIAL (ML) INTRAVENOUS
Status: DISCONTINUED | OUTPATIENT
Start: 2022-03-04 | End: 2022-03-04

## 2022-03-04 RX ORDER — ACETAMINOPHEN 10 MG/ML
1000 INJECTION, SOLUTION INTRAVENOUS ONCE
Status: COMPLETED | OUTPATIENT
Start: 2022-03-04 | End: 2022-03-04

## 2022-03-04 RX ORDER — KETOROLAC TROMETHAMINE 30 MG/ML
15 INJECTION, SOLUTION INTRAMUSCULAR; INTRAVENOUS ONCE
Status: COMPLETED | OUTPATIENT
Start: 2022-03-04 | End: 2022-03-04

## 2022-03-04 RX ORDER — IBUPROFEN 600 MG/1
600 TABLET ORAL EVERY 6 HOURS PRN
Qty: 20 TABLET | Refills: 0 | Status: SHIPPED | OUTPATIENT
Start: 2022-03-04 | End: 2022-08-05

## 2022-03-04 RX ORDER — ONDANSETRON 2 MG/ML
INJECTION INTRAMUSCULAR; INTRAVENOUS
Status: DISCONTINUED | OUTPATIENT
Start: 2022-03-04 | End: 2022-03-04

## 2022-03-04 RX ORDER — LIDOCAINE HYDROCHLORIDE 20 MG/ML
INJECTION, SOLUTION EPIDURAL; INFILTRATION; INTRACAUDAL; PERINEURAL
Status: DISCONTINUED | OUTPATIENT
Start: 2022-03-04 | End: 2022-03-04

## 2022-03-04 RX ORDER — HYDROMORPHONE HYDROCHLORIDE 2 MG/ML
0.5 INJECTION, SOLUTION INTRAMUSCULAR; INTRAVENOUS; SUBCUTANEOUS EVERY 5 MIN PRN
Status: DISCONTINUED | OUTPATIENT
Start: 2022-03-04 | End: 2022-03-04 | Stop reason: HOSPADM

## 2022-03-04 RX ADMIN — KETOROLAC TROMETHAMINE 15 MG: 30 INJECTION, SOLUTION INTRAMUSCULAR at 09:03

## 2022-03-04 RX ADMIN — GLYCOPYRROLATE 0.2 MG: 0.2 INJECTION, SOLUTION INTRAMUSCULAR; INTRAVITREAL at 07:03

## 2022-03-04 RX ADMIN — MIDAZOLAM 2 MG: 1 INJECTION INTRAMUSCULAR; INTRAVENOUS at 06:03

## 2022-03-04 RX ADMIN — PROPOFOL 150 MG: 10 INJECTION, EMULSION INTRAVENOUS at 07:03

## 2022-03-04 RX ADMIN — FENTANYL CITRATE 50 MCG: 50 INJECTION, SOLUTION INTRAMUSCULAR; INTRAVENOUS at 07:03

## 2022-03-04 RX ADMIN — PROPOFOL 50 MG: 10 INJECTION, EMULSION INTRAVENOUS at 07:03

## 2022-03-04 RX ADMIN — PHENYLEPHRINE HYDROCHLORIDE 200 MCG: 10 INJECTION INTRAVENOUS at 07:03

## 2022-03-04 RX ADMIN — SODIUM CHLORIDE, SODIUM LACTATE, POTASSIUM CHLORIDE, AND CALCIUM CHLORIDE: .6; .31; .03; .02 INJECTION, SOLUTION INTRAVENOUS at 06:03

## 2022-03-04 RX ADMIN — PHENYLEPHRINE HYDROCHLORIDE 100 MCG: 10 INJECTION INTRAVENOUS at 07:03

## 2022-03-04 RX ADMIN — LIDOCAINE HYDROCHLORIDE 100 MG: 20 INJECTION, SOLUTION EPIDURAL; INFILTRATION; INTRACAUDAL; PERINEURAL at 07:03

## 2022-03-04 RX ADMIN — ACETAMINOPHEN 1000 MG: 10 INJECTION, SOLUTION INTRAVENOUS at 08:03

## 2022-03-04 RX ADMIN — DEXAMETHASONE SODIUM PHOSPHATE 4 MG: 4 INJECTION, SOLUTION INTRA-ARTICULAR; INTRALESIONAL; INTRAMUSCULAR; INTRAVENOUS; SOFT TISSUE at 07:03

## 2022-03-04 RX ADMIN — ONDANSETRON 8 MG: 2 INJECTION, SOLUTION INTRAMUSCULAR; INTRAVENOUS at 07:03

## 2022-03-04 NOTE — H&P
Diagnosis: history of endometrial hyperplasia   Planned Procedure: [facility] D&C   Date of Planned Procedure: today     Cc: I am here for my surgery     HPI: Azucena Morrison is a 71 y.o. female with history of PMB and possible complex hyperplasia without atypia on EMB discussed with gyn onc who recommend [facility] D&C which showed inactive endometrium and no hyperplasia so due to her h/o complex abdominal surgeries they recommended progesterone treatment and repeat EMB Q6 months. Has not had any more PMB but unable to get a sample from lining in office so will do EMB.            ROS:  GENERAL: Denies weight gain or weight loss. Feeling well overall.   SKIN: Denies rash or lesions.   HEAD: Denies head injury or headache.   CHEST: Denies chest pain or shortness of breath.   CARDIOVASCULAR: Denies palpitations or left sided chest pain.   ABDOMEN: No abdominal pain, constipation, diarrhea, nausea, vomiting or rectal bleeding.   URINARY: No frequency, dysuria, hematuria, or burning on urination.  REPRODUCTIVE: See HPI.   HEMATOLOGIC: No easy bruisability or excessive bleeding.   MUSCULOSKELETAL: Denies joint pain or swelling.   NEUROLOGIC: Denies syncope or weakness.   PSYCHIATRIC: Denies depression, anxiety or mood swings.     PMHx:        Past Medical History:   Diagnosis Date    Chronic kidney disease, stage 3      Diabetes mellitus      Diabetes mellitus, type 2      Hypertension      Malnutrition of moderate degree 2020    UTI (urinary tract infection) 2020         Surgical hx:         Past Surgical History:   Procedure Laterality Date    ABDOMINAL SURGERY        CATHETERIZATION OF URETER Bilateral 2020     Procedure: CATHETERIZATION, URETER;  Surgeon: Kvng Cunningham MD;  Location: [facility] OR [address] [place] [address];  Service: Urology;  Laterality: Bilateral;     SECTION, CLASSIC         x2    CHOLECYSTECTOMY       CLOSURE, ILEOSTOMY, OPEN N/A 2021     Procedure: CLOSURE, ILEOSTOMY, OPEN, ERAS  low;  Surgeon: Fabiana Valiente MD;  Location: [facility] OR 2ND FLR;  Service: Colon and Rectal;  Laterality: N/A;    COLON SURGERY   2015     [facility]    COLONOSCOPY N/A 6/4/2018     Procedure: COLONOSCOPY;  Surgeon: Gibran Aguayo MD;  Location: [facility] ENDO;  Service: Endoscopy;  Laterality: N/A;    COLOSTOMY Left 2015    CYSTOSCOPY N/A 7/31/2020     Procedure: CYSTOSCOPY;  Surgeon: Kvng Cunningham MD;  Location: [facility] OR [facility] FLR;  Service: Urology;  Laterality: N/A;    CYSTOSCOPY WITH URETEROSCOPY, RETROGRADE PYELOGRAPHY, AND INSERTION OF STENT Left 2/22/2019     Procedure: CYSTOSCOPY, WITH RETROGRADE PYELOGRAM AND URETERAL STENT INSERTION with placement of a muir cath;  Surgeon: Ulisses Horton MD;  Location: [facility] OR;  Service: General;  Laterality: Left;    FLEXIBLE SIGMOIDOSCOPY N/A 2/9/2021     Procedure: SIGMOIDOSCOPY, FLEXIBLE;  Surgeon: Fabiana Valiente MD;  [facility];  Service: Colon and Rectal;  Laterality: N/A;    HYSTEROSCOPY WITH DILATION AND CURETTAGE OF UTERUS N/A 12/9/2020     Procedure: HYSTEROSCOPY, WITH DILATION AND CURETTAGE OF UTERUS (MYOSURE);  Surgeon: Caitlyn Feng MD;  Location: [facility] OR;  Service: OB/GYN;  Laterality: N/A;  Guido notified 11/30, EF  video    ILEOSTOMY   7/31/2020     Procedure: CREATION, ILEOSTOMY;  Surgeon: Fabiana Valiente MD;  [facility];  Service: Colon and Rectal;;    INCISION AND DRAINAGE OF ABSCESS N/A 12/22/2019     Procedure: INCISION AND DRAINAGE, ABSCESS;  Surgeon: Ulisses Horton MD;  Location: [facility] OR;  Service: General;  Laterality: N/A;    INCISION OF ABDOMINAL WALL N/A 8/10/2018     Procedure: INCISION, ABDOMINAL WALL;  Surgeon: Ulisses Horton MD;  Location: [facility] OR;  Service: General;  Laterality: N/A;    INCISIONAL HERNIA REPAIR Right 2010    LOW ANTERIOR RESECTION OF COLON N/A 7/31/2020     Procedure: RESECTION, COLON, LOW ANTERIOR;  Surgeon: Fabiana Valiente MD;  Location: [facility] OR [facility]  FLR;  Service: Colon and Rectal;  Laterality: N/A;    LYSIS OF ADHESIONS   2020     Procedure: LYSIS, ADHESIONS;  Surgeon: Fabiana Valiente MD;  Location: [facility] OR 2ND FLR;  Service: Colon and Rectal;;         GYNhx: No LMP recorded (lmp unknown). Patient is postmenopausal.     Obhx:            Review of patient's allergies indicates:   Allergen Reactions    Chlorhexidine gluconate Rash       Chlorhexidine/alcohol wipes. Rash and blistering.         MEDS: Reviewed, reconciled        Current Outpatient Medications:     CARVEDILOL PHOSPHATE 20 MG ORAL CM24 (COREG CR) 20 mg 24 hr capsule, , Disp: , Rfl:     CHOLESTYRAMINE LIGHT 4 gram packet, Take 1 packet by mouth 2 (two) times daily., Disp: , Rfl:     ciclopirox (PENLAC) 8 % Soln, Apply topically., Disp: , Rfl:     folic acid/multivit-min/lutein (CENTRUM SILVER ORAL), Take by mouth once daily., Disp: , Rfl:     hydroCHLOROthiazide (HYDRODIURIL) 25 MG tablet, Take 1 tablet (25 mg total) by mouth once daily., Disp: 30 tablet, Rfl: 3    medroxyPROGESTERone (PROVERA) 10 MG tablet, Take 1 tablet (10 mg total) by mouth once daily., Disp: 30 tablet, Rfl: 11    metFORMIN (GLUCOPHAGE) 1000 MG tablet, Take 1,000 mg by mouth 2 (two) times daily with meals., Disp: , Rfl:     ondansetron (ZOFRAN-ODT) 4 MG TbDL, Take by mouth., Disp: , Rfl:     TRUE METRIX GLUCOSE TEST STRIP Strp, , Disp: , Rfl:     TRUEPLUS LANCETS 33 gauge Misc, , Disp: , Rfl:      Social hx:    Social History               Socioeconomic History    Marital status: Single   Tobacco Use    Smoking status: Former Smoker       Types: Cigarettes       Quit date: 2000       Years since quittin.1    Smokeless tobacco: Never Used   Substance and Sexual Activity    Alcohol use: No    Drug use: No    Sexual activity: Not Currently            Family hx:          Family History   Problem Relation Age of Onset    Stroke Mother      Heart disease Mother      Hyperlipidemia Mother       Hypertension Mother      Alcohol abuse Father      Stroke Sister      Diabetes Sister      Heart disease Sister      Hyperlipidemia Sister      Hypertension Sister      Diabetes Brother      Early death Grandchild      Anesthesia problems Neg Hx      Broken bones Neg Hx      Cancer Neg Hx           PE:   Vitals: BP (!) 132/90   Wt 74.6 kg (164 lb 5.7 oz)   LMP  (LMP Unknown)   BMI 28.21 kg/m²   APPEARANCE: Well nourished, well developed, in no acute distress.  SKIN: Normal skin turgor, no lesions.  CHEST: Lungs clear to auscultation.  HEART: Regular rate and rhythm, no murmurs, rubs or gallops.  ABDOMEN: Soft. No tenderness or masses. No hepatosplenomegaly. No hernias.  PELVIC: deferred  EXTREMITIES: No clubbing cyanosis or edema.       A/P: Azucena Morrison is a 71 y.o. female who presents for surgery      1) Surgery:   Patient cleared by cardiology to proceed with surgery. No changes when she was seen in clinic last month.

## 2022-03-04 NOTE — DISCHARGE INSTRUCTIONS
Dieta:Resuma herbert dieta normal, a menos que tenga nausea : tome bastante liquidos y coma comida blandas.    Actividades:  Si usted recibio sedantes o anestesia, usted se sentira con sueno por varias horas.Gradualmente resuma jackie actividades normales.    Medicinas: Medicamento para dolor tomeselo cuando lo necesite y a nick se lo recetaron. Si le recetaron antibioticos,tomese todo el medicamento.    No maneje herbert automovil, tome productos alcoholicos,o firme documentos legales por las siguiente 24 horas o mientra vinod tomando medicamento para dolor.    LLAME A HERBERT MEDICO:   Si comienza a sangrar ( CIERTA GERRI SECA ES NORMAL).     Si la herida esta ubaldo,inflamada,le empieza a supurar, o fiebre mas de101.  Si comienza a toser,escupir gerri,dolor de pecho. . Si la piernas de abajo de la rodillas le duelen, se sienten caliente, o se le inflaman .  Si tiene dolor o nausea/vomito persistente.    SI USTED TIENE CUALQUIER PROBLEMA O DIFFICULTADES MEDICA, POR FAVOR LLAME A HERBERT MEDICO. SI USTED NO SE COMUNICA CON HERBERT MEDICO CORAL SIGE TENIENDO SIMPTOMAS QUE REQUERIEN ASSISTENCIA MEDICA, POR FAVOR REGRESE AL DEPARTAMENTO DE EMERGENCIA MAS CERCANO.

## 2022-03-04 NOTE — ANESTHESIA PROCEDURE NOTES
Intubation    Date/Time: 3/4/2022 7:07 AM  Performed by: Sandra Hawkins CRNA  Authorized by: Sandra Hawkins CRNA     Intubation:     Induction:  Intravenous    Intubated:  Postinduction    Mask Ventilation:  N/a    Attempts:  1    Attempted By:  CRNA    Method of Intubation:  Other (see comments) (lma)    Difficult Airway Encountered?: No      Complications:  None    Airway Device:  Supraglottic airway/LMA    Airway Device Size:  4.0    Style/Cuff Inflation:  Cuffed    Inflation Amount (mL):  10    Secured at:  The lips    Placement Verified By:  Capnometry    Complicating Factors:  None    Findings Post-Intubation:  Atraumatic/condition of teeth unchanged

## 2022-03-04 NOTE — TRANSFER OF CARE
"Anesthesia Transfer of Care Note    Patient: Azucena Morrison    Procedure(s) Performed: Procedure(s) (LRB):  HYSTEROSCOPY, WITH DILATION AND CURETTAGE OF UTERUS (N/A)    Patient location: PACU    Anesthesia Type: general    Transport from OR: Transported from OR on 6-10 L/min O2 by face mask with adequate spontaneous ventilation    Post pain: adequate analgesia    Post assessment: no apparent anesthetic complications    Post vital signs: stable    Level of consciousness: awake    Nausea/Vomiting: no nausea/vomiting    Complications: none    Transfer of care protocol was followed      Last vitals:   Visit Vitals  /69   Pulse 95   Temp 36.4 °C (97.5 °F) (Temporal)   Resp 16   Ht 5' 4" (1.626 m)   Wt 72.6 kg (160 lb)   LMP  (LMP Unknown)   SpO2 100%   Breastfeeding No   BMI 27.46 kg/m²     "

## 2022-03-04 NOTE — ANESTHESIA PREPROCEDURE EVALUATION
03/04/2022  Azucena Morrison is a 71 y.o., female for hysteroscopy D&C  Patient Active Problem List   Diagnosis    History of hemicolectomy    Status post Aiyana's procedure    Type 2 diabetes mellitus without complication, with long-term current use of insulin    Essential hypertension    Drainage from wound    S/P colostomy    Fistula of intestine, excluding rectum and anus    S/P exploratory laparotomy    Abscess of abdominal wall    Type 2 diabetes mellitus with skin complication    Unspecified local infection of skin and subcutaneous tissue    Diabetes with skin complication    Generalized abdominal pain    Colonic fistula    Polyp of colon    Abdominal wall abscess    Colocutaneous fistula    Postprocedural intraabdominal abscess    Chronic kidney disease, stage 3    Infection due to ESBL-producing Klebsiella pneumoniae    Cutaneous fistula    Malnutrition of moderate degree    Intra-abdominal abscess    Onychomycosis    Status post hysteroscopy    Ileostomy in place    S/P closure of ileostomy    Rotator cuff impingement syndrome of right shoulder    Preoperative clearance    Mixed hyperlipidemia    Chest pain of uncertain etiology    GRACE (dyspnea on exertion)         Pre-op Assessment       I have reviewed the Medications.     Review of Systems  Anesthesia Hx:  Denies Family Hx of Anesthesia complications.    Social:  Former Smoker, No Alcohol Use    Cardiovascular:   Hypertension GRACE    Pulmonary:   Shortness of breath    Renal/:   Chronic Renal Disease    Neurological:   Neuromuscular Disease,    Endocrine:   Diabetes, type 2        Physical Exam  General: Well nourished    Airway:  Mallampati: II   TM Distance: Normal  Neck ROM: Normal ROM    Dental:  Edentulous    Chest/Lungs:  Clear to auscultation    Heart:  Rate: Normal  Rhythm: Regular  Rhythm        Anesthesia Plan  Type of Anesthesia, risks & benefits discussed:    Anesthesia Type: Gen Supraglottic Airway  Intra-op Monitoring Plan: Standard ASA Monitors  Post Op Pain Control Plan: multimodal analgesia  Informed Consent: Informed consent signed with the Patient and all parties understand the risks and agree with anesthesia plan.  All questions answered.   ASA Score: 3    Ready For Surgery From Anesthesia Perspective.     .

## 2022-03-04 NOTE — OP NOTE
03/04/2022    Procedure: hysteroscopy dilation and currettage     Pre-Op Diagnosis: h/o endometrial hyperplasia     Post-Op Diagnosis: same     Anesthesia: general     Complications: None    Condition: Stable    EBL: 5 cc    Primary Surgeon: Caitlyn Feng    Assistant: none     Findings: atrophic appearing endometrium     Specimen: endometrial currettings     TECHNIQUE: The patient was taken to the Operating Room where her  anesthesia  was found to be adequate. Her legs were placed in Khai stirrups. She was   then prepared and draped in normal sterile fashion.     An weighted speculum was placed into the vagina and a right angle retractor was used to visualize the cervix. Single tooth tenaculum was placed at the anterior cervix.    The cervix was dilated using Hegar dilators to 8. The hysteroscope was advanced through the cervix into the uterus where the above findings were seen. The myosure was used to take a sample from the entire endometrium.     The single tooth tenaculum was removed and the sites were made hemostatic with silver nitrate. No vaginal bleeding was noted.      The patient tolerated the procedure well. All instruments were then removed   from the vagina. The patient was then taken to the PACU in stable condition.

## 2022-03-07 ENCOUNTER — TELEPHONE (OUTPATIENT)
Dept: OBSTETRICS AND GYNECOLOGY | Facility: CLINIC | Age: 72
End: 2022-03-07
Payer: MEDICARE

## 2022-03-07 NOTE — ANESTHESIA POSTPROCEDURE EVALUATION
Anesthesia Post Evaluation    Patient: Azucena Morrison    Procedure(s) Performed: Procedure(s) (LRB):  HYSTEROSCOPY, WITH DILATION AND CURETTAGE OF UTERUS (N/A)    Final Anesthesia Type: general      Patient location during evaluation: PACU  Patient participation: Yes- Able to Participate  Level of consciousness: awake and alert  Post-procedure vital signs: reviewed and stable  Pain management: adequate  Airway patency: patent  CHANDRIKA mitigation strategies: Multimodal analgesia  PONV status at discharge: No PONV  Anesthetic complications: no      Cardiovascular status: hemodynamically stable and blood pressure returned to baseline  Respiratory status: room air, unassisted and spontaneous ventilation  Hydration status: euvolemic  Follow-up not needed.          Vitals Value Taken Time   /85 03/04/22 0950   Temp 36.6 °C (97.9 °F) 03/04/22 0950   Pulse 89 03/04/22 0950   Resp 20 03/04/22 0950   SpO2 97 % 03/04/22 0950         Event Time   Out of Recovery 08:55:00         Pain/Patience Score: No data recorded

## 2022-03-07 NOTE — TELEPHONE ENCOUNTER
----- Message from Benson Whaley sent at 3/7/2022 12:06 PM CST -----  Type:  Patient Requesting Call Back    Who Called:Azucena Morrison  Rico Call Back Number:[phone] or [phone](Azucena and daughter in law)  Patient Requesting Call Back: call back  Additional Information: regarding DNC on this past Friday and had complications for a wound caused to be open. Patient requesting  due to limited english, but daughter in law available 4:30 pm and can speak English.

## 2022-03-08 ENCOUNTER — TELEPHONE (OUTPATIENT)
Dept: OBSTETRICS AND GYNECOLOGY | Facility: CLINIC | Age: 72
End: 2022-03-08
Payer: MEDICARE

## 2022-03-08 LAB
FINAL PATHOLOGIC DIAGNOSIS: NORMAL
GROSS: NORMAL
Lab: NORMAL

## 2022-03-08 NOTE — TELEPHONE ENCOUNTER
----- Message from Caitlyn Feng MD sent at 3/8/2022  2:50 PM CST -----  Please let her know that the sample from the D&C was normal

## 2022-03-09 ENCOUNTER — TELEPHONE (OUTPATIENT)
Dept: SURGERY | Facility: CLINIC | Age: 72
End: 2022-03-09
Payer: MEDICARE

## 2022-03-09 NOTE — TELEPHONE ENCOUNTER
I am not sure what she is talking about, we didn't do surgery on her abdomen only in the vagina.  So an open wound on her abdomen wouldn't be from the surgery. I am happy to see her to take a look at it but she may need to go to the ER to have it evaluated. Michelle can you add her on for an appointment this am?

## 2022-03-09 NOTE — TELEPHONE ENCOUNTER
[id number]     Called pt back about wound that she is describing. The wound that opened was from her intestines surgery. Woke up on Sunday to the open wound on her stomach. Pt said she is having normal bowel movement and passing gas. The fluid leaking is only a little bit, and it is only blood. Told pt that we would reach out to her surgeon that did the surgery on her intestine and get back to her. Pt verbalized understanding.

## 2022-03-09 NOTE — TELEPHONE ENCOUNTER
Called patient with language line. She reports she rubs vicks vapor rub on her abdomen since her original bowel surgery. She said on Sunday there was an area around her umbilicus that felt firm then opened up, small, about a tear drop and blood leaked out. It was moderate at first but is now a small amount. No pus or continued leaking of fluid. She is eating well, passing gas and having BM. She is not having fever. No pain. Discussed with her I do not think this is from the surgery we did but could be an old seroma opened up and likely will resolve but should be evaluated, reached out to colo-rectal to see if wants me to evaluate it or if she wants to.

## 2022-03-15 ENCOUNTER — TELEPHONE (OUTPATIENT)
Dept: SURGERY | Facility: CLINIC | Age: 72
End: 2022-03-15
Payer: MEDICARE

## 2022-03-15 NOTE — PROGRESS NOTES
"CRS Office Visit Follow-up    SUBJECTIVE:     History of Present Illness:  Patient is a 71 y.o. female who presents following ileostomy reversal on 2/9/2021.   Had D+C on 3/4/2022, had some opening of her midline incision after this.  No bleeding, no pus or enteric drainage, no pain, no fevers.  The area has since healed. Bowel movents continue to be formed with good control.  Going to [place] next month.    Review of Systems:  ROS    OBJECTIVE:     Vital Signs (Most Recent)  /76 (BP Location: Left arm, Patient Position: Sitting, BP Method: Large (Automatic))   Pulse 72   Ht 5' 4" (1.626 m)   Wt 75.5 kg (166 lb 8 oz)   LMP  (LMP Unknown)   BMI 28.58 kg/m²     Physical Exam:  General:  or Other  female in no distress   Neuro: Alert and oriented x 4.  Moves all extremities.     HEENT: No icterus.  Trachea midline  Respiratory: Respirations are even and unlabored  Cardiac: Regular rate  Abdomen: Soft, ileostomy incision healed, small area of superficial opening of midline near umbilicus, no tenderness, no drainage with palpation, I do not see any knot/suture material after gently probing with Q-tip  Extremities: Warm dry and intact      ASSESSMENT/PLAN:     69yo F s/p ileostomy reversal on 2/9/2021, overall doing very well  - RTC prn  - Based on history (last colonoscopy 2018 was without polyps, no family history) recommend colonoscopy in 2025    Fabiana Valiente MD  Staff Surgeon, Colon and Rectal Surgery  [facility]                "

## 2022-03-16 ENCOUNTER — OFFICE VISIT (OUTPATIENT)
Dept: SURGERY | Facility: CLINIC | Age: 72
End: 2022-03-16
Attending: COLON & RECTAL SURGERY
Payer: MEDICARE

## 2022-03-16 VITALS
DIASTOLIC BLOOD PRESSURE: 76 MMHG | BODY MASS INDEX: 28.42 KG/M2 | SYSTOLIC BLOOD PRESSURE: 136 MMHG | HEART RATE: 72 BPM | WEIGHT: 166.5 LBS | HEIGHT: 64 IN

## 2022-03-16 DIAGNOSIS — Z98.890 POST-OPERATIVE STATE: Primary | ICD-10-CM

## 2022-03-16 PROCEDURE — 99999 PR PBB SHADOW E&M-EST. PATIENT-LVL III: CPT | Mod: PBBFAC,,, | Performed by: COLON & RECTAL SURGERY

## 2022-03-16 PROCEDURE — 99999 PR PBB SHADOW E&M-EST. PATIENT-LVL III: ICD-10-PCS | Mod: PBBFAC,,, | Performed by: COLON & RECTAL SURGERY

## 2022-03-16 PROCEDURE — 3078F PR MOST RECENT DIASTOLIC BLOOD PRESSURE < 80 MM HG: ICD-10-PCS | Mod: CPTII,S$GLB,, | Performed by: COLON & RECTAL SURGERY

## 2022-03-16 PROCEDURE — 3008F PR BODY MASS INDEX (BMI) DOCUMENTED: ICD-10-PCS | Mod: CPTII,S$GLB,, | Performed by: COLON & RECTAL SURGERY

## 2022-03-16 PROCEDURE — 3078F DIAST BP <80 MM HG: CPT | Mod: CPTII,S$GLB,, | Performed by: COLON & RECTAL SURGERY

## 2022-03-16 PROCEDURE — 99024 PR POST-OP FOLLOW-UP VISIT: ICD-10-PCS | Mod: S$GLB,,, | Performed by: COLON & RECTAL SURGERY

## 2022-03-16 PROCEDURE — 3288F FALL RISK ASSESSMENT DOCD: CPT | Mod: CPTII,S$GLB,, | Performed by: COLON & RECTAL SURGERY

## 2022-03-16 PROCEDURE — 1160F RVW MEDS BY RX/DR IN RCRD: CPT | Mod: CPTII,S$GLB,, | Performed by: COLON & RECTAL SURGERY

## 2022-03-16 PROCEDURE — 1126F AMNT PAIN NOTED NONE PRSNT: CPT | Mod: CPTII,S$GLB,, | Performed by: COLON & RECTAL SURGERY

## 2022-03-16 PROCEDURE — 1101F PR PT FALLS ASSESS DOC 0-1 FALLS W/OUT INJ PAST YR: ICD-10-PCS | Mod: CPTII,S$GLB,, | Performed by: COLON & RECTAL SURGERY

## 2022-03-16 PROCEDURE — 1159F PR MEDICATION LIST DOCUMENTED IN MEDICAL RECORD: ICD-10-PCS | Mod: CPTII,S$GLB,, | Performed by: COLON & RECTAL SURGERY

## 2022-03-16 PROCEDURE — 3008F BODY MASS INDEX DOCD: CPT | Mod: CPTII,S$GLB,, | Performed by: COLON & RECTAL SURGERY

## 2022-03-16 PROCEDURE — 1160F PR REVIEW ALL MEDS BY PRESCRIBER/CLIN PHARMACIST DOCUMENTED: ICD-10-PCS | Mod: CPTII,S$GLB,, | Performed by: COLON & RECTAL SURGERY

## 2022-03-16 PROCEDURE — 3075F PR MOST RECENT SYSTOLIC BLOOD PRESS GE 130-139MM HG: ICD-10-PCS | Mod: CPTII,S$GLB,, | Performed by: COLON & RECTAL SURGERY

## 2022-03-16 PROCEDURE — 1101F PT FALLS ASSESS-DOCD LE1/YR: CPT | Mod: CPTII,S$GLB,, | Performed by: COLON & RECTAL SURGERY

## 2022-03-16 PROCEDURE — 3075F SYST BP GE 130 - 139MM HG: CPT | Mod: CPTII,S$GLB,, | Performed by: COLON & RECTAL SURGERY

## 2022-03-16 PROCEDURE — 1159F MED LIST DOCD IN RCRD: CPT | Mod: CPTII,S$GLB,, | Performed by: COLON & RECTAL SURGERY

## 2022-03-16 PROCEDURE — 1126F PR PAIN SEVERITY QUANTIFIED, NO PAIN PRESENT: ICD-10-PCS | Mod: CPTII,S$GLB,, | Performed by: COLON & RECTAL SURGERY

## 2022-03-16 PROCEDURE — 99024 POSTOP FOLLOW-UP VISIT: CPT | Mod: S$GLB,,, | Performed by: COLON & RECTAL SURGERY

## 2022-03-16 PROCEDURE — 3288F PR FALLS RISK ASSESSMENT DOCUMENTED: ICD-10-PCS | Mod: CPTII,S$GLB,, | Performed by: COLON & RECTAL SURGERY

## 2022-03-24 ENCOUNTER — OFFICE VISIT (OUTPATIENT)
Dept: OBSTETRICS AND GYNECOLOGY | Facility: CLINIC | Age: 72
End: 2022-03-24
Payer: MEDICARE

## 2022-03-24 VITALS
DIASTOLIC BLOOD PRESSURE: 100 MMHG | SYSTOLIC BLOOD PRESSURE: 160 MMHG | BODY MASS INDEX: 28.72 KG/M2 | WEIGHT: 167.31 LBS

## 2022-03-24 DIAGNOSIS — Z98.890 STATUS POST HYSTEROSCOPY: Primary | ICD-10-CM

## 2022-03-24 PROCEDURE — 1101F PR PT FALLS ASSESS DOC 0-1 FALLS W/OUT INJ PAST YR: ICD-10-PCS | Mod: CPTII,S$GLB,, | Performed by: OBSTETRICS & GYNECOLOGY

## 2022-03-24 PROCEDURE — 1126F AMNT PAIN NOTED NONE PRSNT: CPT | Mod: CPTII,S$GLB,, | Performed by: OBSTETRICS & GYNECOLOGY

## 2022-03-24 PROCEDURE — 3077F PR MOST RECENT SYSTOLIC BLOOD PRESSURE >= 140 MM HG: ICD-10-PCS | Mod: CPTII,S$GLB,, | Performed by: OBSTETRICS & GYNECOLOGY

## 2022-03-24 PROCEDURE — 3080F DIAST BP >= 90 MM HG: CPT | Mod: CPTII,S$GLB,, | Performed by: OBSTETRICS & GYNECOLOGY

## 2022-03-24 PROCEDURE — 1126F PR PAIN SEVERITY QUANTIFIED, NO PAIN PRESENT: ICD-10-PCS | Mod: CPTII,S$GLB,, | Performed by: OBSTETRICS & GYNECOLOGY

## 2022-03-24 PROCEDURE — 99999 PR PBB SHADOW E&M-EST. PATIENT-LVL III: CPT | Mod: PBBFAC,,, | Performed by: OBSTETRICS & GYNECOLOGY

## 2022-03-24 PROCEDURE — 1159F PR MEDICATION LIST DOCUMENTED IN MEDICAL RECORD: ICD-10-PCS | Mod: CPTII,S$GLB,, | Performed by: OBSTETRICS & GYNECOLOGY

## 2022-03-24 PROCEDURE — 3008F PR BODY MASS INDEX (BMI) DOCUMENTED: ICD-10-PCS | Mod: CPTII,S$GLB,, | Performed by: OBSTETRICS & GYNECOLOGY

## 2022-03-24 PROCEDURE — 99024 POSTOP FOLLOW-UP VISIT: CPT | Mod: S$GLB,,, | Performed by: OBSTETRICS & GYNECOLOGY

## 2022-03-24 PROCEDURE — 3288F FALL RISK ASSESSMENT DOCD: CPT | Mod: CPTII,S$GLB,, | Performed by: OBSTETRICS & GYNECOLOGY

## 2022-03-24 PROCEDURE — 3008F BODY MASS INDEX DOCD: CPT | Mod: CPTII,S$GLB,, | Performed by: OBSTETRICS & GYNECOLOGY

## 2022-03-24 PROCEDURE — 1160F RVW MEDS BY RX/DR IN RCRD: CPT | Mod: CPTII,S$GLB,, | Performed by: OBSTETRICS & GYNECOLOGY

## 2022-03-24 PROCEDURE — 1159F MED LIST DOCD IN RCRD: CPT | Mod: CPTII,S$GLB,, | Performed by: OBSTETRICS & GYNECOLOGY

## 2022-03-24 PROCEDURE — 3077F SYST BP >= 140 MM HG: CPT | Mod: CPTII,S$GLB,, | Performed by: OBSTETRICS & GYNECOLOGY

## 2022-03-24 PROCEDURE — 1101F PT FALLS ASSESS-DOCD LE1/YR: CPT | Mod: CPTII,S$GLB,, | Performed by: OBSTETRICS & GYNECOLOGY

## 2022-03-24 PROCEDURE — 1160F PR REVIEW ALL MEDS BY PRESCRIBER/CLIN PHARMACIST DOCUMENTED: ICD-10-PCS | Mod: CPTII,S$GLB,, | Performed by: OBSTETRICS & GYNECOLOGY

## 2022-03-24 PROCEDURE — 99024 PR POST-OP FOLLOW-UP VISIT: ICD-10-PCS | Mod: S$GLB,,, | Performed by: OBSTETRICS & GYNECOLOGY

## 2022-03-24 PROCEDURE — 3288F PR FALLS RISK ASSESSMENT DOCUMENTED: ICD-10-PCS | Mod: CPTII,S$GLB,, | Performed by: OBSTETRICS & GYNECOLOGY

## 2022-03-24 PROCEDURE — 99999 PR PBB SHADOW E&M-EST. PATIENT-LVL III: ICD-10-PCS | Mod: PBBFAC,,, | Performed by: OBSTETRICS & GYNECOLOGY

## 2022-03-24 PROCEDURE — 3080F PR MOST RECENT DIASTOLIC BLOOD PRESSURE >= 90 MM HG: ICD-10-PCS | Mod: CPTII,S$GLB,, | Performed by: OBSTETRICS & GYNECOLOGY

## 2022-03-24 RX ORDER — MELOXICAM 15 MG/1
15 TABLET ORAL DAILY
COMMUNITY
Start: 2022-02-14 | End: 2022-08-05

## 2022-03-24 RX ORDER — FENOFIBRATE 200 MG/1
200 CAPSULE ORAL DAILY
COMMUNITY
Start: 2022-02-14 | End: 2023-04-02

## 2022-03-24 RX ORDER — METOPROLOL SUCCINATE 25 MG/1
25 TABLET, EXTENDED RELEASE ORAL DAILY
COMMUNITY
Start: 2022-02-04 | End: 2022-12-06

## 2022-03-24 NOTE — PROGRESS NOTES
CC: s/p [facility]    HPI:   zAucena Morrison is a 71 y.o. here for f/u [facility] D&C on 3/4/22. She reports normal bowel movements and urination. Pain is controlled with OTC medications.  No vaginal bleeding or vaginal discharge    Vitals:    03/24/22 1316   BP: (!) 160/100       PHYSICAL EXAM:   APPEARANCE: Well nourished, well developed, in no acute distress.  ABDOMEN: Soft. No tenderness or masses. No hernias. well healing incision  PELVIC: declined today     Pathology: Endometrium curetting:   Inactive endometrium with focal tubal metaplasia   No evidence of hyperplasia or malignancy     1. Status post hysteroscopy          PLAN:   1. Will monitor, negative biopsy x1 year and  Finished progesterone, repeat biopsy as needed.

## 2022-06-21 NOTE — ED NOTES
Dr becker speaking to transfer center for medical management recomadations for this pt while awaiting transfer to [facility]    Plan: Recheck in 3 months Detail Level: Zone Initiate Treatment: Spironolactone 50mg BID Continue Regimen: Tretinoin 0.025% Render In Strict Bullet Format?: No Discontinue Regimen: Clindamycin 1% lotion

## 2022-07-04 NOTE — PROGRESS NOTES
"  [facility]  Adult Nutrition  Consult Note    SUMMARY     Recommendations    1. Recommend Custom TPN 90 gm AA / 215 gm Dex + IV lipids to provide 1591 kcal, 90 gm protein, and GIR of 2.11.      2. As medically able, ADAT to diabetic diet texture per SLP.               - Continue Boost Plus and Boost Breeze.      3.Suggest adding Vitamin C, MVI, and zinc to aid in wound healing.      4. RD following.     Goals: pt to tolerate >85% of EEN/EPN by RD follow up  Nutrition Goal Status: goal met  Communication of RD Recs: (POC)    Reason for Assessment    Reason For Assessment: RD follow-up  Diagnosis: (cutaneous fistula)  Relevant Medical History: DM; HTN; CKD3  Interdisciplinary Rounds: did not attend    General Information Comments: Diet advanced to clear liquids this AM, pt also receiving TPN (no lipids ordered). NFPE completed 7/17 - pt with mild-moderate muscle/fat wasting. Pt meets criteria for moderate malnutrition. 5 days post-op colon resection, cystoscopy, ileostomy.   Nutrition Discharge Planning: Unable to determine    Nutrition/Diet History    Patient Reported Diet/Restrictions/Preferences: general  Spiritual, Cultural Beliefs, Sikhism Practices, Values that Affect Care: no  Food Allergies: NKFA  Factors Affecting Nutritional Intake: altered gastrointestinal function, clear liquid diet    Anthropometrics    Temp: 98 °F (36.7 °C)  Height Method: Stated  Height: 5' 1" (154.9 cm)  Height (inches): 61 in  Weight Method: Bed Scale  Weight: 70.7 kg (155 lb 13.8 oz)  Weight (lb): 155.87 lb  Ideal Body Weight (IBW), Female: 105 lb  % Ideal Body Weight, Female (lb): 148.45 %  BMI (Calculated): 29.5  BMI Grade: 25 - 29.9 - overweight    Lab/Procedures/Meds    Pertinent Labs Reviewed: reviewed  Pertinent Labs Comments: Na 135,   Pertinent Medications Reviewed: reviewed    Estimated/Assessed Needs    Weight Used For Calorie Calculations: 70.7 kg (155 lb 13.8 oz)     Energy Calorie " Requirements (kcal): 1461 kcal/day  Energy Need Method: Lee-St Jeor(x 1.25)     Protein Requirements: 85-99 g/day  Weight Used For Protein Calculations: 70.7 kg (155 lb 13.8 oz)     Fluid Requirements (mL): 1 mL/kcal or per MD     CHO Requirement: 175    Nutrition Prescription Ordered    Current Diet Order: Clear liquids  Current Nutrition Support Formula Ordered: (Custom TPN 90 gm AA / 270 gm Dex)  Current Nutrition Support Rate Ordered: 75 mL/hr  Oral Nutrition Supplement: Boost Plus, Boost Breeze    Evaluation of Received Nutrient/Fluid Intake    Parenteral Calories (kcal): 1278  Parenteral Protein (gm): 90  Parenteral Fluid (mL): 1800  Lipid Calories (kcals): 0 kcals  GIR (Glucose Infusion Rate) (mg/kg/min): 2.65 mg/kg/min    % Kcal Needs: 87%  % Protein Needs: 100%    Energy Calories Required: meeting needs  Protein Required: meeting needs  Fluid Required: (per MD)    Comments: LBM: 8/2    Tolerance: tolerating    Nutrition Risk    Level of Risk/Frequency of Follow-up: (1x/week)     Assessment and Plan    Nutrition Problem:  Moderate Protein-Calorie Malnutrition  Malnutrition in the context of Acute Illness/Injury     Related to (etiology):  Decreased intake      Signs and Symptoms (as evidenced by):  Energy Intake: <75% of estimated energy requirement for >1 week  Body Fat Depletion: mild depletion of triceps   Muscle Mass Depletion: mild and moderate depletion of temples, clavicle region, interosseous muscle and lower extremities   Weight Loss: 6% x unsure of time frame per pt      Interventions (treatment strategy):  Collaboration of care with other providers  Parenteral Nutrition     Nutrition Diagnosis Status:  Improving     Monitor and Evaluation    Food and Nutrient Intake: energy intake, food and beverage intake, parenteral nutrition intake  Food and Nutrient Adminstration: diet order, enteral and parenteral nutrition administration  Physical Activity and Function: nutrition-related ADLs and  IADLs  Anthropometric Measurements: weight, weight change  Biochemical Data, Medical Tests and Procedures: electrolyte and renal panel, gastrointestinal profile, glucose/endocrine profile, lipid profile, inflammatory profile  Nutrition-Focused Physical Findings: overall appearance     Malnutrition Assessment    Malnutrition Type: acute illness or injury  Weight Loss (Malnutrition): (6% unsure of time frame)  Energy Intake (Malnutrition): less than 75% for greater than 7 days  Subcutaneous Fat (Malnutrition): moderate depletion  Muscle Mass (Malnutrition): moderate depletion   Orbital Region (Subcutaneous Fat Loss): mild depletion  Upper Arm Region (Subcutaneous Fat Loss): moderate depletion   [place] Region (Muscle Loss): mild depletion  Clavicle Bone Region (Muscle Loss): mild depletion  Clavicle and Acromion Bone Region (Muscle Loss): mild depletion  Dorsal Hand (Muscle Loss): mild depletion  Anterior Thigh Region (Muscle Loss): moderate depletion  Posterior Calf Region (Muscle Loss): moderate depletion   Edema (Fluid Accumulation): 0-->no edema present     Nutrition Follow-Up    RD Follow-up?: Yes   30

## 2022-07-19 ENCOUNTER — PATIENT MESSAGE (OUTPATIENT)
Dept: RESEARCH | Facility: CLINIC | Age: 72
End: 2022-07-19
Payer: MEDICARE

## 2022-08-05 ENCOUNTER — HOSPITAL ENCOUNTER (EMERGENCY)
Facility: HOSPITAL | Age: 72
Discharge: HOME | End: 2022-08-05
Attending: EMERGENCY MEDICINE
Payer: MEDICARE

## 2022-08-05 VITALS
WEIGHT: 173 LBS | TEMPERATURE: 98 F | RESPIRATION RATE: 18 BRPM | OXYGEN SATURATION: 97 % | HEART RATE: 72 BPM | HEIGHT: 62 IN | DIASTOLIC BLOOD PRESSURE: 105 MMHG | BODY MASS INDEX: 31.83 KG/M2 | SYSTOLIC BLOOD PRESSURE: 184 MMHG

## 2022-08-05 DIAGNOSIS — R73.9 HYPERGLYCEMIA: Primary | ICD-10-CM

## 2022-08-05 DIAGNOSIS — R06.02 SOB (SHORTNESS OF BREATH): ICD-10-CM

## 2022-08-05 DIAGNOSIS — I10 HYPERTENSION, UNSPECIFIED TYPE: ICD-10-CM

## 2022-08-05 DIAGNOSIS — R06.02 SHORTNESS OF BREATH: ICD-10-CM

## 2022-08-05 LAB
ALBUMIN SERPL BCP-MCNC: 3.5 G/DL (ref 3.5–5.2)
ALP SERPL-CCNC: 73 U/L (ref 55–135)
ALT SERPL W/O P-5'-P-CCNC: 37 U/L (ref 10–44)
ANION GAP SERPL CALC-SCNC: 14 MMOL/L (ref 8–16)
ANISOCYTOSIS BLD QL SMEAR: SLIGHT
AST SERPL-CCNC: 41 U/L (ref 10–40)
B-OH-BUTYR BLD STRIP-SCNC: 0 MMOL/L (ref 0–0.5)
BACTERIA #/AREA URNS HPF: NORMAL /HPF
BASOPHILS # BLD AUTO: 0.02 K/UL (ref 0–0.2)
BASOPHILS NFR BLD: 0.4 % (ref 0–1.9)
BILIRUB SERPL-MCNC: 0.4 MG/DL (ref 0.1–1)
BILIRUB UR QL STRIP: NEGATIVE
BNP SERPL-MCNC: 52 PG/ML (ref 0–99)
BUN SERPL-MCNC: 17 MG/DL (ref 8–23)
CALCIUM SERPL-MCNC: 9.8 MG/DL (ref 8.7–10.5)
CHLORIDE SERPL-SCNC: 96 MMOL/L (ref 95–110)
CLARITY UR: CLEAR
CO2 SERPL-SCNC: 24 MMOL/L (ref 23–29)
COLOR UR: YELLOW
CREAT SERPL-MCNC: 1.4 MG/DL (ref 0.5–1.4)
D DIMER PPP IA.FEU-MCNC: 0.93 MG/L FEU
DIFFERENTIAL METHOD: ABNORMAL
EOSINOPHIL # BLD AUTO: 0.1 K/UL (ref 0–0.5)
EOSINOPHIL NFR BLD: 1.5 % (ref 0–8)
ERYTHROCYTE [DISTWIDTH] IN BLOOD BY AUTOMATED COUNT: 13.2 % (ref 11.5–14.5)
EST. GFR  (NO RACE VARIABLE): 40 ML/MIN/1.73 M^2
GLUCOSE SERPL-MCNC: 344 MG/DL (ref 70–110)
GLUCOSE UR QL STRIP: ABNORMAL
HCT VFR BLD AUTO: 36.4 % (ref 37–48.5)
HGB BLD-MCNC: 13.1 G/DL (ref 12–16)
HGB UR QL STRIP: ABNORMAL
HYALINE CASTS #/AREA URNS LPF: 0 /LPF
IMM GRANULOCYTES # BLD AUTO: 0.02 K/UL (ref 0–0.04)
IMM GRANULOCYTES NFR BLD AUTO: 0.4 % (ref 0–0.5)
KETONES UR QL STRIP: NEGATIVE
LEUKOCYTE ESTERASE UR QL STRIP: NEGATIVE
LYMPHOCYTES # BLD AUTO: 1.6 K/UL (ref 1–4.8)
LYMPHOCYTES NFR BLD: 29.2 % (ref 18–48)
MCH RBC QN AUTO: 30.1 PG (ref 27–31)
MCHC RBC AUTO-ENTMCNC: 36 G/DL (ref 32–36)
MCV RBC AUTO: 84 FL (ref 82–98)
MICROSCOPIC COMMENT: NORMAL
MONOCYTES # BLD AUTO: 0.3 K/UL (ref 0.3–1)
MONOCYTES NFR BLD: 6.2 % (ref 4–15)
NEUTROPHILS # BLD AUTO: 3.4 K/UL (ref 1.8–7.7)
NEUTROPHILS NFR BLD: 62.3 % (ref 38–73)
NITRITE UR QL STRIP: NEGATIVE
NRBC BLD-RTO: 0 /100 WBC
PH UR STRIP: 6 [PH] (ref 5–8)
PLATELET # BLD AUTO: 171 K/UL (ref 150–450)
PLATELET BLD QL SMEAR: ABNORMAL
PMV BLD AUTO: 9.4 FL (ref 9.2–12.9)
POCT GLUCOSE: 258 MG/DL (ref 70–110)
POTASSIUM SERPL-SCNC: 3.7 MMOL/L (ref 3.5–5.1)
PROT SERPL-MCNC: 7.8 G/DL (ref 6–8.4)
PROT UR QL STRIP: ABNORMAL
RBC # BLD AUTO: 4.35 M/UL (ref 4–5.4)
RBC #/AREA URNS HPF: 3 /HPF (ref 0–4)
SARS-COV-2 RDRP RESP QL NAA+PROBE: NEGATIVE
SODIUM SERPL-SCNC: 134 MMOL/L (ref 136–145)
SP GR UR STRIP: 1.02 (ref 1–1.03)
TROPONIN I SERPL DL<=0.01 NG/ML-MCNC: 0.01 NG/ML (ref 0–0.03)
TROPONIN I SERPL DL<=0.01 NG/ML-MCNC: 0.02 NG/ML (ref 0–0.03)
URN SPEC COLLECT METH UR: ABNORMAL
UROBILINOGEN UR STRIP-ACNC: NEGATIVE EU/DL
WBC # BLD AUTO: 5.45 K/UL (ref 3.9–12.7)
WBC #/AREA URNS HPF: 2 /HPF (ref 0–5)
YEAST URNS QL MICRO: NORMAL

## 2022-08-05 PROCEDURE — 83880 ASSAY OF NATRIURETIC PEPTIDE: CPT | Performed by: NURSE PRACTITIONER

## 2022-08-05 PROCEDURE — 96374 THER/PROPH/DIAG INJ IV PUSH: CPT | Mod: 59

## 2022-08-05 PROCEDURE — 63600175 PHARM REV CODE 636 W HCPCS: Performed by: NURSE PRACTITIONER

## 2022-08-05 PROCEDURE — 80053 COMPREHEN METABOLIC PANEL: CPT | Performed by: NURSE PRACTITIONER

## 2022-08-05 PROCEDURE — 85379 FIBRIN DEGRADATION QUANT: CPT | Performed by: NURSE PRACTITIONER

## 2022-08-05 PROCEDURE — 25500020 PHARM REV CODE 255: Performed by: NURSE PRACTITIONER

## 2022-08-05 PROCEDURE — 84484 ASSAY OF TROPONIN QUANT: CPT | Performed by: NURSE PRACTITIONER

## 2022-08-05 PROCEDURE — 93010 EKG 12-LEAD: ICD-10-PCS | Mod: ,,, | Performed by: INTERNAL MEDICINE

## 2022-08-05 PROCEDURE — 96361 HYDRATE IV INFUSION ADD-ON: CPT | Mod: 59

## 2022-08-05 PROCEDURE — 93010 ELECTROCARDIOGRAM REPORT: CPT | Mod: ,,, | Performed by: INTERNAL MEDICINE

## 2022-08-05 PROCEDURE — 81000 URINALYSIS NONAUTO W/SCOPE: CPT | Performed by: NURSE PRACTITIONER

## 2022-08-05 PROCEDURE — 99285 EMERGENCY DEPT VISIT HI MDM: CPT | Mod: 25

## 2022-08-05 PROCEDURE — 93005 ELECTROCARDIOGRAM TRACING: CPT

## 2022-08-05 PROCEDURE — U0002 COVID-19 LAB TEST NON-CDC: HCPCS | Performed by: NURSE PRACTITIONER

## 2022-08-05 PROCEDURE — 82962 GLUCOSE BLOOD TEST: CPT

## 2022-08-05 PROCEDURE — 85025 COMPLETE CBC W/AUTO DIFF WBC: CPT | Performed by: NURSE PRACTITIONER

## 2022-08-05 PROCEDURE — 25000003 PHARM REV CODE 250: Performed by: NURSE PRACTITIONER

## 2022-08-05 PROCEDURE — 82010 KETONE BODYS QUAN: CPT | Performed by: NURSE PRACTITIONER

## 2022-08-05 RX ORDER — INSULIN GLARGINE 100 [IU]/ML
10 INJECTION, SOLUTION SUBCUTANEOUS NIGHTLY
Qty: 10 ML | Refills: 0 | Status: SHIPPED | OUTPATIENT
Start: 2022-08-05 | End: 2022-12-06

## 2022-08-05 RX ORDER — ACETAMINOPHEN 500 MG
500 TABLET ORAL EVERY 6 HOURS PRN
COMMUNITY

## 2022-08-05 RX ORDER — CARVEDILOL 25 MG/1
25 TABLET ORAL 2 TIMES DAILY
COMMUNITY
Start: 2022-05-28 | End: 2022-08-05 | Stop reason: DRUGHIGH

## 2022-08-05 RX ORDER — METOPROLOL SUCCINATE 25 MG/1
25 TABLET, EXTENDED RELEASE ORAL
Status: COMPLETED | OUTPATIENT
Start: 2022-08-05 | End: 2022-08-05

## 2022-08-05 RX ORDER — INSULIN GLARGINE 100 [IU]/ML
10 INJECTION, SOLUTION SUBCUTANEOUS NIGHTLY
COMMUNITY
End: 2022-08-05 | Stop reason: SDUPTHER

## 2022-08-05 RX ORDER — HYDROCHLOROTHIAZIDE 25 MG/1
25 TABLET ORAL
Status: COMPLETED | OUTPATIENT
Start: 2022-08-05 | End: 2022-08-05

## 2022-08-05 RX ORDER — CLONIDINE HYDROCHLORIDE 0.1 MG/1
0.2 TABLET ORAL
Status: DISCONTINUED | OUTPATIENT
Start: 2022-08-05 | End: 2022-08-05

## 2022-08-05 RX ADMIN — HYDROCHLOROTHIAZIDE 25 MG: 25 TABLET ORAL at 03:08

## 2022-08-05 RX ADMIN — INSULIN HUMAN 6 UNITS: 100 INJECTION, SOLUTION PARENTERAL at 03:08

## 2022-08-05 RX ADMIN — IOHEXOL 100 ML: 350 INJECTION, SOLUTION INTRAVENOUS at 01:08

## 2022-08-05 RX ADMIN — METOPROLOL SUCCINATE 25 MG: 25 TABLET, EXTENDED RELEASE ORAL at 03:08

## 2022-08-05 RX ADMIN — SODIUM CHLORIDE 1000 ML: 0.9 INJECTION, SOLUTION INTRAVENOUS at 01:08

## 2022-08-05 NOTE — DISCHARGE INSTRUCTIONS
TAKE MEDS AS PRESCRIBED AND FOLLOW UP WITH PCP.       Thank you for letting me care for you today! It was nice meeting you, and I hope you feel better soon. Please come back to [facility] for all of your future medical needs.   Our goal in the ER is to always give you outstanding care and exceptional service. You may receive a survey by mail or email in the next week about your experience in our ED. We would greatly appreciate you completing and returning the survey. Your feedback provides us with a way to recognize our staff who give very good care and it helps us learn how to improve when your experience was below our aspiration of excellence.     Sincerely,     ALLEGRA Fonseca  Emergency Room Nurse Practitioner  [facility]

## 2022-08-05 NOTE — PHARMACY MED REC
"Admission Medication History     The home medication history was taken by Laurie Chaes CPhT.    Medication history obtained from, Patient Verifed    You may go to "Admission" then "Reconcile Home Medications" tabs to review and/or act upon these items.      The home medication list has been updated by the Pharmacy department.    Please read ALL comments highlighted in yellow.    Please address this information as you see fit.     Feel free to contact us if you have any questions or require assistance.      The medications listed below were removed from the home medication list.  Please reorder if appropriate:  Patient reports no longer taking the following medication(s):   Cholestyramine 4 gram    Centrum Silver Women's    Ibuprofen 600 mg   Provera 10 mg   Mobic 15 mg   Zofran-ODT 4 mg        Laurie Chase CPhT.  Ext [phone]                .          "

## 2022-08-05 NOTE — ED PROVIDER NOTES
Encounter Date: 2022    SCRIBE #1 NOTE: I, Corine Lane, am scribing for, and in the presence of,  Jocy Tenorio NP. I have scribed the following portions of the note - Other sections scribed: HPI, ROS, PE, MDM.       History     Chief Complaint   Patient presents with    Hyperglycemia     Elevated blood sugar readings x3 days. This a.m. 337, 427. Pt. Is Metformin 1000mg bid. Reports her normal glucose range 110.      The patient is a 72 year old female presenting to the ED with complaint of hyperglycemia, onset 3 days ago. The patient reports her glucose levels have been between 300-400. She states her symptoms improved after taking medication. She also complains of SOB which is worsened with exertion and states it began before her trip to [employer] 16 days ago. She also complains of a headache. She is not currently taking any blood thinners. She denies history of blood clots. She denies fever, rash, chest pain or neck stiffness.    History is provided by the patient.   A  was used.     Review of patient's allergies indicates:   Allergen Reactions    Chlorhexidine gluconate Rash     Chlorhexidine/alcohol wipes. Rash and blistering.     Past Medical History:   Diagnosis Date    Chronic kidney disease, stage 3     Diabetes mellitus     Diabetes mellitus, type 2     Hypertension     Malnutrition of moderate degree 2020    Mixed hyperlipidemia 2022    UTI (urinary tract infection) 2020     Past Surgical History:   Procedure Laterality Date    ABDOMINAL SURGERY      CATHETERIZATION OF URETER Bilateral 2020    Procedure: CATHETERIZATION, URETER;  Surgeon: Kvng Cunningham MD;  Location: [facility] OR [address] [place] [address];  Service: Urology;  Laterality: Bilateral;     SECTION, CLASSIC      x2    CHOLECYSTECTOMY      CLOSURE, ILEOSTOMY, OPEN N/A 2021    Procedure: CLOSURE, ILEOSTOMY, OPEN, ERAS low;  Surgeon: Fabiana Valiente MD;  Location: [facility] OR [address] [place] [address];   Service: Colon and Rectal;  Laterality: N/A;    COLON SURGERY  2015    [facility]    COLONOSCOPY N/A 6/4/2018    Procedure: COLONOSCOPY;  Surgeon: Gibran Aguayo MD;  Location: [facility] ENDO;  Service: Endoscopy;  Laterality: N/A;    COLOSTOMY Left 2015    CYSTOSCOPY N/A 7/31/2020    Procedure: CYSTOSCOPY;  Surgeon: Kvng Cunningham MD;  Location: [facility] OR 2ND FLR;  Service: Urology;  Laterality: N/A;    CYSTOSCOPY WITH URETEROSCOPY, RETROGRADE PYELOGRAPHY, AND INSERTION OF STENT Left 2/22/2019    Procedure: CYSTOSCOPY, WITH RETROGRADE PYELOGRAM AND URETERAL STENT INSERTION with placement of a muir cath;  Surgeon: Ulisses Horton MD;  Location: [facility] OR;  Service: General;  Laterality: Left;    FLEXIBLE SIGMOIDOSCOPY N/A 2/9/2021    Procedure: SIGMOIDOSCOPY, FLEXIBLE;  Surgeon: Fabiana Valiente MD;  Location: [facility] OR 2ND FLR;  Service: Colon and Rectal;  Laterality: N/A;    HYSTEROSCOPY WITH DILATION AND CURETTAGE OF UTERUS N/A 12/9/2020    Procedure: HYSTEROSCOPY, WITH DILATION AND CURETTAGE OF UTERUS (MYOSURE);  Surgeon: Caitlyn Feng MD;  Location: [facility] OR;  Service: OB/GYN;  Laterality: N/A;  Guido notified 11/30, EF  video    HYSTEROSCOPY WITH DILATION AND CURETTAGE OF UTERUS N/A 3/4/2022    Procedure: HYSTEROSCOPY, WITH DILATION AND CURETTAGE OF UTERUS;  Surgeon: Caitlyn Feng MD;  Location: [facility] OR;  Service: OB/GYN;  Laterality: N/A;  Alexander notified  3/3  video confirmed    ILEOSTOMY  7/31/2020    Procedure: CREATION, ILEOSTOMY;  Surgeon: Fabiana Valiente MD;  Location: [facility] OR 2ND FLR;  Service: Colon and Rectal;;    INCISION AND DRAINAGE OF ABSCESS N/A 12/22/2019    Procedure: INCISION AND DRAINAGE, ABSCESS;  Surgeon: Ulisses Horton MD;  Location: [facility] OR;  Service: General;  Laterality: N/A;    INCISION OF ABDOMINAL WALL N/A 8/10/2018    Procedure: INCISION, ABDOMINAL WALL;  Surgeon: Ulisses Horton MD;  Location: [facility] OR;  Service: General;  Laterality: N/A;    INCISIONAL  HERNIA REPAIR Right 2010    LOW ANTERIOR RESECTION OF COLON N/A 2020    Procedure: RESECTION, COLON, LOW ANTERIOR;  Surgeon: Fabiana Valiente MD;  Location: [facility] OR 2ND FLR;  Service: Colon and Rectal;  Laterality: N/A;    LYSIS OF ADHESIONS  2020    Procedure: LYSIS, ADHESIONS;  Surgeon: Fabiana Valiente MD;  Location: [facility] OR 2ND FLR;  Service: Colon and Rectal;;     Family History   Problem Relation Age of Onset    Stroke Mother     Heart disease Mother     Hyperlipidemia Mother     Hypertension Mother     Alcohol abuse Father     Stroke Sister     Diabetes Sister     Heart disease Sister     Hyperlipidemia Sister     Hypertension Sister     Diabetes Brother     Early death Grandchild     Anesthesia problems Neg Hx     Broken bones Neg Hx     Cancer Neg Hx      Social History     Tobacco Use    Smoking status: Former Smoker     Types: Cigarettes     Quit date: 2000     Years since quittin.6    Smokeless tobacco: Never Used   Substance Use Topics    Alcohol use: No    Drug use: No     Review of Systems   Constitutional: Negative for chills and fever.   HENT: Negative for ear pain and sore throat.    Eyes: Negative for redness.   Respiratory: Positive for shortness of breath.    Cardiovascular: Negative for chest pain.   Gastrointestinal: Negative for abdominal pain, diarrhea and vomiting.   Endocrine:        Hyperglycemia.   Genitourinary: Negative for dysuria.   Musculoskeletal: Negative for back pain and neck stiffness.   Skin: Negative for rash.   Neurological: Positive for headaches.       Physical Exam     Initial Vitals [22 1121]   BP Pulse Resp Temp SpO2   (!) 200/95 95 18 97.9 °F (36.6 °C) 98 %      MAP       --         Physical Exam    Nursing note and vitals reviewed.  Constitutional: She appears well-developed and well-nourished.   HENT:   Head: Normocephalic and atraumatic.   Eyes: Conjunctivae and EOM are normal. Pupils are equal, round, and reactive  to light.   Neck: Neck supple.   Normal range of motion.  Cardiovascular: Exam reveals no gallop and no friction rub.    No murmur heard.  No peripheral edema noted. + pt/dp      Pulmonary/Chest: Breath sounds normal. Tachypnea noted.   Abdominal: Abdomen is soft. Bowel sounds are normal. She exhibits no distension. There is no abdominal tenderness. There is no rebound and no guarding.   Musculoskeletal:         General: No tenderness or edema. Normal range of motion.      Cervical back: Normal range of motion and neck supple.     Lymphadenopathy:     She has no cervical adenopathy.   Neurological: She is alert and oriented to person, place, and time. She has normal strength and normal reflexes.   Skin: Skin is warm and dry.   Psychiatric: She has a normal mood and affect. Her behavior is normal. Judgment and thought content normal.         ED Course   Procedures  Labs Reviewed   COMPREHENSIVE METABOLIC PANEL - Abnormal; Notable for the following components:       Result Value    Sodium 134 (*)     Glucose 344 (*)     AST 41 (*)     eGFR 40 (*)     All other components within normal limits   URINALYSIS, REFLEX TO URINE CULTURE - Abnormal; Notable for the following components:    Protein, UA 2+ (*)     Glucose, UA 4+ (*)     Occult Blood UA Trace (*)     All other components within normal limits    Narrative:     Specimen Source->Urine   CBC W/ AUTO DIFFERENTIAL - Abnormal; Notable for the following components:    Hematocrit 36.4 (*)     All other components within normal limits   D DIMER, QUANTITATIVE - Abnormal; Notable for the following components:    D-Dimer 0.93 (*)     All other components within normal limits   POCT GLUCOSE - Abnormal; Notable for the following components:    POCT Glucose 258 (*)     All other components within normal limits   BETA - HYDROXYBUTYRATE, SERUM   B-TYPE NATRIURETIC PEPTIDE   TROPONIN I   SARS-COV-2 RNA AMPLIFICATION, QUAL   URINALYSIS MICROSCOPIC    Narrative:     Specimen  Source->Urine   TROPONIN I   POCT GLUCOSE MONITORING CONTINUOUS   POCT GLUCOSE MONITORING CONTINUOUS        ECG Results          EKG 12-lead (Final result)  Result time 08/05/22 16:24:37    Final result by Interface, Lab In [facility] (08/05/22 16:24:37)                 Narrative:    Test Reason : R06.02,    Vent. Rate : 079 BPM     Atrial Rate : 079 BPM     P-R Int : 132 ms          QRS Dur : 080 ms      QT Int : 396 ms       P-R-T Axes : -11 -39 014 degrees     QTc Int : 454 ms    Sinus rhythm with Premature atrial complexes  Left axis deviation  Voltage criteria for left ventricular hypertrophy  Abnormal ECG  When compared with ECG of 02-OCT-2020 15:28,  No significant change was found  Confirmed by Marco A Breaux MD ([id number]) on 8/5/2022 4:24:28 PM    Referred By: [email or web address]   SELF           Confirmed By:Marco A Breaux MD                            Imaging Results          CTA Chest Non-Coronary - PE Study (Final result)  Result time 08/05/22 14:43:16    Final result by Jem Davila MD (08/05/22 14:43:16)                 Impression:      No evidence of acute pulmonary embolus to the proximal segmental level.    Atherosclerosis and coronary artery calcifications.    Hepatic steatosis.    Small hiatal hernia.      Electronically signed by: Jem Davila MD  Date:    08/05/2022  Time:    14:43             Narrative:    EXAMINATION:  CTA CHEST NON CORONARY    CLINICAL HISTORY:  Pulmonary embolism (PE) suspected, positive D-dimer;    TECHNIQUE:  Low dose axial images, sagittal and coronal reformations were obtained from the thoracic inlet to the lung bases following the IV administration of 100 mL of Omnipaque 350.  Contrast timing was optimized to evaluate the pulmonary arteries.  MIP images were performed.    COMPARISON:  Chest radiograph, 08/05/2022.  CT chest, 08/06/2020.    FINDINGS:  Examination of the soft tissue and vascular structures at the base of the neck is unremarkable.    The thoracic aorta  "maintains normal caliber, contour, and course with mild-to-moderate calcific atherosclerotic calcification.  There is no evidence of aneurysmal dilation or dissection.    The pulmonary arteries distribute normally without evidence of filling defect to indicate pulmonary thromboembolism.    The trachea and proximal airways are patent.    The lungs are symmetrically expanded and without evidence of consolidation, pneumothorax, mass, or significant pleural thickening.  There is mild linear subsegmental atelectasis in the left lower lobe.  No evidence of pleural fluid.    The heart is not enlarged.  No pericardial effusion.  There are coronary artery calcifications.    There is no axillary, mediastinal, or hilar lymph node enlargement.    The esophagus maintains a normal course and caliber.    Limited images of the upper abdomen obtained during the course of this dedicated thoracic CT is negative for acute findings.  Liver appears enlarged and there is diffuse hepatic steatosis.  There is a small hiatal hernia.    The osseous structures are negative for acute finding or aggressive osseous lesion.                               X-Ray Chest 1 View (Final result)  Result time 08/05/22 13:22:00    Final result by Jem Davila MD (08/05/22 13:22:00)                 Impression:      No acute cardiopulmonary finding identified on this single view.      Electronically signed by: Jem Davila MD  Date:    08/05/2022  Time:    13:22             Narrative:    EXAMINATION:  XR CHEST 1 VIEW    CLINICAL HISTORY:  Provided history is "  Shortness of breath".    TECHNIQUE:  One view of the chest.    COMPARISON:  08/04/2020.    FINDINGS:  Cardiac wires overlie the chest.  Cardiac silhouette is not enlarged.  No focal consolidation.  No sizable pleural effusion.  No pneumothorax.                                 Medications   sodium chloride 0.9% bolus 1,000 mL (0 mLs Intravenous Stopped 8/5/22 2409)   iohexoL (OMNIPAQUE 350) " injection 100 mL (100 mLs Intravenous Given 8/5/22 1355)   insulin regular injection 6 Units 0.06 mL (6 Units Intravenous Given 8/5/22 1523)   hydroCHLOROthiazide tablet 25 mg (25 mg Oral Given 8/5/22 1518)   metoprolol succinate (TOPROL-XL) 24 hr tablet 25 mg (25 mg Oral Given 8/5/22 1518)     Medical Decision Making:   Initial Assessment:   The patient is a 72 year old female presenting to the ED with complaint of hyperglycemia, onset 3 days ago. Patient is tachycardic and tachypneic.   Clinical Tests:   Lab Tests: Ordered and Reviewed  Radiological Study: Ordered and Reviewed  Medical Tests: Reviewed and Ordered          Scribe Attestation:   Scribe #1: I performed the above scribed service and the documentation accurately describes the services I performed. I attest to the accuracy of the note.        ED Course as of 08/05/22 1640   Fri Aug 05, 2022   1503 Pt updated on status. CT is negative for PE, troponin to be repeated. Glucose in the 260 range and will be given dose of Insulin. Also will be given dose of her home BP meds as she did not take them today. Pt otherwise states she is feeling much better at this time.  [DT]   1630 Pts BP, and glucose have improved. Pt has been encouraged to take meds as prescribed, strict return precautions given and stable for dc. Pt reports feeling much better and is not toxic in appearance.  [DT]   1640 Also refilled her Lantus as she is out.  [DT]      ED Course User Index  [DT] Jocy Tenorio, TYRELL             Clinical Impression:   Final diagnoses:  [R06.02] SOB (shortness of breath)  [R06.02] Shortness of breath  [R73.9] Hyperglycemia (Primary)  [I10] Hypertension, unspecified type          ED Disposition Condition    Discharge Stable       I, Jocy Tenorio NP, personally performed the services described in this documentation.All medical record entries made by the scribe were at my direction and in my presence.I have reviewed the chart and agree that the record reflects  my personal performance and is accurate and complete.     ED Prescriptions     Medication Sig Dispense Start Date End Date Auth. Provider    insulin glargine (LANTUS U-100 INSULIN) 100 unit/mL injection Inject 10 Units into the skin every evening. 10 mL 8/5/2022  Jocy Tenorio NP        Follow-up Information     Follow up With Specialties Details Why Contact Info    Pj Sanches MD Internal Medicine Schedule an appointment as soon as possible for a visit in 2 days  [address] [place] [address]  [phone]             Jocy Tenorio NP  08/05/22 1631       Jocy Tenorio NP  08/05/22 1640

## 2022-08-05 NOTE — ED NOTES
APPEARANCE: Awake, alert, & oriented. No acute distress.  CARDIAC: Normal rate and rhythm. Denies chest pain.     RESPIRATORY: Respirations are even and unlabored no obvious signs of distress. No accessory muscle use. Breath sounds clear bilaterally throughout chest.  PERIPHERAL VASCULAR: peripheral pulses present. Normal cap refill. No edema.   GASTRO: soft, no tenderness, no abdominal distention.  MUSC: Full ROM. No bony tenderness or soft tissue tenderness. No obvious deformity.  SKIN: Skin is warm, dry, and intact. Normal skin turgor and color.  NEURO: Equal strength bilaterally. Tyrese coma scale: Eye Response-4, Motor Response-6, Verbal Response-5. Total=15. Clear speech. No neurological abnormalities.   EENT: No c/o vision or hearing difficulties. Oropharynx clear.  Patient reports resolution of symptoms and was able to tolerate food provided

## 2022-08-23 ENCOUNTER — LAB VISIT (OUTPATIENT)
Dept: LAB | Facility: HOSPITAL | Age: 72
End: 2022-08-23
Attending: SURGERY
Payer: MEDICARE

## 2022-08-23 DIAGNOSIS — E11.9 TYPE 2 DIABETES MELLITUS WITHOUT COMPLICATION, WITH LONG-TERM CURRENT USE OF INSULIN: ICD-10-CM

## 2022-08-23 DIAGNOSIS — K43.9 VENTRAL HERNIA WITHOUT OBSTRUCTION OR GANGRENE: ICD-10-CM

## 2022-08-23 DIAGNOSIS — R11.2 NAUSEA AND VOMITING, INTRACTABILITY OF VOMITING NOT SPECIFIED, UNSPECIFIED VOMITING TYPE: ICD-10-CM

## 2022-08-23 DIAGNOSIS — Z79.4 TYPE 2 DIABETES MELLITUS WITHOUT COMPLICATION, WITH LONG-TERM CURRENT USE OF INSULIN: ICD-10-CM

## 2022-08-23 LAB
ANION GAP SERPL CALC-SCNC: 14 MMOL/L (ref 8–16)
BASOPHILS # BLD AUTO: 0.03 K/UL (ref 0–0.2)
BASOPHILS NFR BLD: 0.3 % (ref 0–1.9)
BUN SERPL-MCNC: 34 MG/DL (ref 8–23)
CALCIUM SERPL-MCNC: 10.7 MG/DL (ref 8.7–10.5)
CHLORIDE SERPL-SCNC: 105 MMOL/L (ref 95–110)
CO2 SERPL-SCNC: 22 MMOL/L (ref 23–29)
CREAT SERPL-MCNC: 1.7 MG/DL (ref 0.5–1.4)
DIFFERENTIAL METHOD: ABNORMAL
EOSINOPHIL # BLD AUTO: 0.1 K/UL (ref 0–0.5)
EOSINOPHIL NFR BLD: 1 % (ref 0–8)
ERYTHROCYTE [DISTWIDTH] IN BLOOD BY AUTOMATED COUNT: 12.4 % (ref 11.5–14.5)
EST. GFR  (NO RACE VARIABLE): 32 ML/MIN/1.73 M^2
GLUCOSE SERPL-MCNC: 217 MG/DL (ref 70–110)
HCT VFR BLD AUTO: 38.1 % (ref 37–48.5)
HGB BLD-MCNC: 13 G/DL (ref 12–16)
IMM GRANULOCYTES # BLD AUTO: 0.04 K/UL (ref 0–0.04)
IMM GRANULOCYTES NFR BLD AUTO: 0.4 % (ref 0–0.5)
LYMPHOCYTES # BLD AUTO: 2.5 K/UL (ref 1–4.8)
LYMPHOCYTES NFR BLD: 23.8 % (ref 18–48)
MCH RBC QN AUTO: 29 PG (ref 27–31)
MCHC RBC AUTO-ENTMCNC: 34.1 G/DL (ref 32–36)
MCV RBC AUTO: 85 FL (ref 82–98)
MONOCYTES # BLD AUTO: 0.5 K/UL (ref 0.3–1)
MONOCYTES NFR BLD: 5.2 % (ref 4–15)
NEUTROPHILS # BLD AUTO: 7.3 K/UL (ref 1.8–7.7)
NEUTROPHILS NFR BLD: 69.3 % (ref 38–73)
NRBC BLD-RTO: 0 /100 WBC
PLATELET # BLD AUTO: 390 K/UL (ref 150–450)
PMV BLD AUTO: 9 FL (ref 9.2–12.9)
POTASSIUM SERPL-SCNC: 4.8 MMOL/L (ref 3.5–5.1)
RBC # BLD AUTO: 4.49 M/UL (ref 4–5.4)
SODIUM SERPL-SCNC: 141 MMOL/L (ref 136–145)
WBC # BLD AUTO: 10.47 K/UL (ref 3.9–12.7)

## 2022-08-23 PROCEDURE — 80048 BASIC METABOLIC PNL TOTAL CA: CPT | Performed by: SURGERY

## 2022-08-23 PROCEDURE — 85025 COMPLETE CBC W/AUTO DIFF WBC: CPT | Performed by: SURGERY

## 2022-08-23 PROCEDURE — 36415 COLL VENOUS BLD VENIPUNCTURE: CPT | Performed by: SURGERY

## 2022-09-15 PROBLEM — K43.9 VENTRAL HERNIA WITHOUT OBSTRUCTION OR GANGRENE: Status: ACTIVE | Noted: 2022-09-15

## 2022-09-22 ENCOUNTER — HOSPITAL ENCOUNTER (OUTPATIENT)
Dept: RADIOLOGY | Facility: HOSPITAL | Age: 72
Discharge: HOME | End: 2022-09-22
Attending: SURGERY
Payer: MEDICARE

## 2022-09-22 DIAGNOSIS — E11.9 TYPE 2 DIABETES MELLITUS WITHOUT COMPLICATION, WITH LONG-TERM CURRENT USE OF INSULIN: ICD-10-CM

## 2022-09-22 DIAGNOSIS — Z79.4 TYPE 2 DIABETES MELLITUS WITHOUT COMPLICATION, WITH LONG-TERM CURRENT USE OF INSULIN: ICD-10-CM

## 2022-09-22 DIAGNOSIS — R11.2 NAUSEA AND VOMITING, INTRACTABILITY OF VOMITING NOT SPECIFIED, UNSPECIFIED VOMITING TYPE: ICD-10-CM

## 2022-09-22 DIAGNOSIS — K43.9 VENTRAL HERNIA WITHOUT OBSTRUCTION OR GANGRENE: ICD-10-CM

## 2022-09-22 PROCEDURE — 74018 RADEX ABDOMEN 1 VIEW: CPT | Mod: TC,FY

## 2022-09-22 PROCEDURE — 74018 XR ABDOMEN AP 1 VIEW: ICD-10-PCS | Mod: 26,,, | Performed by: RADIOLOGY

## 2022-09-22 PROCEDURE — 74018 RADEX ABDOMEN 1 VIEW: CPT | Mod: 26,,, | Performed by: RADIOLOGY

## 2022-09-29 ENCOUNTER — HOSPITAL ENCOUNTER (OUTPATIENT)
Dept: RADIOLOGY | Facility: HOSPITAL | Age: 72
Discharge: HOME | End: 2022-09-29
Attending: SURGERY
Payer: MEDICARE

## 2022-09-29 DIAGNOSIS — R11.2 NAUSEA AND VOMITING, INTRACTABILITY OF VOMITING NOT SPECIFIED, UNSPECIFIED VOMITING TYPE: ICD-10-CM

## 2022-09-29 DIAGNOSIS — K43.9 VENTRAL HERNIA WITHOUT OBSTRUCTION OR GANGRENE: ICD-10-CM

## 2022-09-29 PROCEDURE — 76700 US EXAM ABDOM COMPLETE: CPT | Mod: 26,,, | Performed by: RADIOLOGY

## 2022-09-29 PROCEDURE — 76700 US EXAM ABDOM COMPLETE: CPT | Mod: TC

## 2022-09-29 PROCEDURE — 76700 US ABDOMEN COMPLETE: ICD-10-PCS | Mod: 26,,, | Performed by: RADIOLOGY

## 2022-12-18 NOTE — ASSESSMENT & PLAN NOTE
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  ---- S/P Wound exploration ,excision and debridement of abdominal wound 8/10/2018  ---- started on Pip/tazo 8/2  ---- Intraoperative cultures from 8/10 growing ESBL Ecoli    Recommendations:  ---- Continue contact precautions  --- Stopped Pip/tazo 8/11. Started Ertapenem 8/11/2018. Will continue ertapenem and will be future antibiosis (1g q24hr). Duration of treatment dependent on future possible surgical interventions. Will touch base with surgery about their plans   (1) More than 48 hours/None

## 2023-01-12 ENCOUNTER — HOSPITAL ENCOUNTER (OUTPATIENT)
Dept: RADIOLOGY | Facility: HOSPITAL | Age: 73
Discharge: HOME | End: 2023-01-12
Attending: INTERNAL MEDICINE
Payer: MEDICARE

## 2023-01-12 DIAGNOSIS — N17.8 ACUTE RENAL FAILURE WITH OTHER SPECIFIED PATHOLOGICAL LESION IN KIDNEY: ICD-10-CM

## 2023-01-12 PROCEDURE — 76770 US RETROPERITONEAL COMPLETE: ICD-10-PCS | Mod: 26,,, | Performed by: RADIOLOGY

## 2023-01-12 PROCEDURE — 76770 US EXAM ABDO BACK WALL COMP: CPT | Mod: 26,,, | Performed by: RADIOLOGY

## 2023-01-12 PROCEDURE — 76770 US EXAM ABDO BACK WALL COMP: CPT | Mod: TC

## 2023-01-15 NOTE — PROGRESS NOTES
"[facility]                Progress Note    Subjective:       Patient ID: Azucena Morrison is a 69 y.o. female.    Chief Complaint: Non-healing Wound    6/29/17: Pt re-admit for distal abdominal wound. Pt denies any fevers or chills but states she feels nauseous at times. Returned to [place] June 28, from [place], reports much trouble with abdominal wound while in [place].  7/6/17-- Patient scheduled for surgical drainage of wound Tuesday 7/11/17DB  7/19/17-- Patient post op clinic visit.  8/16/17: CT of abdomen and pelvis done 8/9/17 due to suspected fistula  8/23/17-- U/S of abdomen done today.  12/13/17 Wound vac with 20cc drainage in clinic today.  1/10/18: on PO abx  1/24/18: Fistulagram ordered per Dr. Horton. Patient still on PO abx  02/21/18 Patient is not on antibiotics at this time. BS fasting 135 per patient report this am.  03/07/18 Culture of Anterior Abdominal Wound is positive. No antibiotics at this time.  fasting per patient report.    03/21/18 Patient c/o nausea along with abdominal tenderness near abd midline wound. Patient states that pain level is 6 and that she passed two sero-sanguineous clots from wound. Patient is afebrile this am.  3/28/18: patient continue antibiotics and reports that pain is less than last weeks clinic visit. Drainage has decreased as well  04/04/18 Patient states that abdominal pain is "pressure" sensation and that when she pushes down on her abdomen on either side of abdominal wound she feels like she has to urinate. Patient reports pain level of 3 this am.  fasting this am per patient report.   5/2/18: Patient had Abdomina Toribio today before wound care. She had discontinued Bactrim PO per Dr. Horton's recommendation and culture results and is now taking Amoxicillin PO  6/20/18: Pt reports itching to wound and periwound   6/27/18: patient reports no new complaints with regards to her wound or abdomen, wound " continues to decrease in size and drainage  8/1/18: Pt reports increased pain to abdomen with nausea and emesis since thursday 7/26/18, denies any fevers, patient did no go to ER as instructed by home health nurse on Sat. 7/28/18.  8/29/18: Patient admitted to hospital 8/2 for abdominal wound complications. Surgery for abdominal abscess per Dr. Horton on 8/3. Patient placed on IV antibiotics and discharged home on 8/18 with [facility]  and PICC line to [facility]. Patient currently on IV ertapenem. IV medication sent to patients home per [facility].   9/5/18: IV Ertapenem to continue 1 week. Culture sent to lab. [facility]  sent orders. PICC line intact to [facility].  9/12/18 Antibiotics completed. Culture results pending. 1 deep stitch remaining.  9/12/18: Culture positive for E. Coli, patient to continue Ertapenem IV antibiotics x 2weeks. [facility] notified  9/26/18: F/U abdominal wound, wound continues to improve. Patient will complete IV antibiotics today  10/3/18: patient c/o diarrhea for 2 days and nausea with some vomiting. Labs and stool culture ordered. IV antibiotics discontinued today  10/10/2018 patient will start on IV antibiotic Invaz IV once a day, pending PICC line placement, order placed today  for PICC per Dr. Horton.  10/24/18: patient on IV antibiotics, tolerating well. Continue for 1 week  11/7/2018: IV Antibiotic discontinued.  11/8/2018: PICC line to left upper arm discontinued by Home Health.  11/21/2018 F/u for abdominal wall fistula , c/o nausea  No vomiting , no fever  12/5/2018: F/u for abdominal wall fisula, c/o gas, Dr. Horton will order Bentyl. Surgery schedule for January 2019.  12/12/2018: F/u for abdominal wall fistula, c/o abdominal pain. Dr. Horton ordered Norco 5/325mg tab 1 PO every 4 hours as needed for pain and referred patient for x-ray of abdomen after clinic today.  12/19/2018: F/u for abdominal wall fisula, c/o nausea, Dr. Horton re ordered Ondansetron (Zofran) 8mg  one tab by mouth every eight hours as needed for nausea. No changes in wound condition from last visit noted in clinic today.  12/26/2018: F/u abdominal wall fistula drainage, no c/o at this time. Denies any abdominal pain or nausea. Dr. Horton recommends surgery first week of January 2019 for abdominal fistula treatment and possible colostomy placement, patient agree.  01/02/2019: F/u abdominal wall fistula. Dressing with large amount of drainage, malodorous, Dr. Horton is scheduling surgery for third week of January, 2019.  1/16/19: F/U Dr. Horton today in clinic, surgery scheduled for next Friday 1/25/19 with Dr. Horton.     01/23/2019: Presents to wound care clinic for f/u abdominal wound care tx. Surgery scheduled with Dr. Horton for Friday 01/25/2019. Dr. Horton explained Mrs. Morrison surgery procedure including possible complications, Nurse  (Belarusian) present, patient verbalizes understanding of procedure including possible complications, all questions from patient were answered by Dr. Horton including blood sugar and blood pressure medications per patient's concerns. Consent for surgery and blood transfusion signed by patient, Dr. Horton and nurse witnessed.  Abdominal wound cultures collected per Dr. Horton, cultures sent to lab/micro pending results. Dr. Horton prescribed the following orders: fleet enema for Thursday 1/24/2019, clear liquid diet and not to eat after midnight all for 1/24/2019. Start taking Neomycin and erythromycin by mouth three times a day (1/24/2019) and dulcolax one tab by mouth twice a day, Mrs. Morrison voices understanding.     01/30/2019: F/U wound care treatment with Dr. Horton. Per pt, primary physician Dr. Pj Monae ordered for her to take potassium pills for three days, last day today and scheduled for lab work at primary physician clinic on 1/31/2019. Per pt. Feeling better, no more abdominal pain (went to ER 1/24/2019 and discharged 1/25/2019 c/o abd  "pain.). Dr. Horton awaiting on primary clearance to re-schedule surgery, per pt. She will call wound care clinic and Dr. Horton to make aware of clearance day. Continue with same orders for dressing change for Dr. Horton. Mrs. Morrison requesting gentamicin refill.     02/06/2019: F/u with Dr. Horton for wound care treatment. Mrs. Morrison brought to clinic a clearance for surgery by Dr. Pj Sanchse dated 02/06/2019 "Hyperkalemia-Resolved K 4.8 2/1/2019, labs , Cr. 1.05, H/H 10/30. No contraindication to surgery, tight control of BS, Hydration." A copy of EKG (1/24/2019) and lab work from [facility] collected 1/31/2019, reported results 2/1/2019. Dr. Horton scheduled surgery for 02/22/2019, consent were signed on 01/23/2019, all questions were answered by Dr. Horton, this nurse  ([place]ankan) present. Education provided to patient on the importance of having a clear liquid diet the day before surgery 02/21/2019 as per Dr. Horton, new medications and preop preparation before surgery, verbalizes understanding. Dr. Horton ordered Norco 5/325 mg PO for pain, Dulcolax two tabs by mouth to take on 02/21/2019, Soap suds enema (SSE) on Thursday 02/21/2019, Golytely by mouth 2 liters on 2/21/2019, Erythromycin 500 mg one tab by mouth three times a day and Neomycin 1 gm by mouth three times a day.     2/13/2019: Presents to wound care clinic for a f/u with Dr. Horton for wound care treatment. No new orders in wound dressing change, reviewed preop orders with Mrs. Morrison per Dr. Horton, all questions answered. Surgery scheduled for 02/22/2019.  2/20/19: Continues wound care a previously ordered.  Depth measured per Dr. Horton 8cm.  Preop orders reviewed with patient who verbalized understanding.  Surgery scheduled for Fri 2/22/19.  Rx given to patient for Norco and Zofran.    03/06/19: Patient admitted to [facility] on 02/22/19 for exploratory laparotomy of abdomen per Dr. Horton. Patient " "discharged on 02/28/19 with home health and PICC line with IV antibiotics. Staples removed today in clinic. Patient denies any fever, chills, n&v; however, she states that she has "low energy." Continued with wound care as ordered.     3/13/2019: F/U with Dr. Horton for wound care treatment. Continue with same wound dressing change orders as per Dr. Horton, orders followed. Home Health to continue wound dressing change and lab draw for CBC weekly; continue IV Ertapenem/Invaz 1 gm IV daily as ordered.     3/20/2019: Clinic visit with Dr. Horton for abdominal abscess treatment. Presents with moderate draining from abdominal wound; wound size decreasing per measurement. Per Dr. Horton, apply one-piece system ostomy bag to abdominal wound for drain collection, may drain bag when it fills and may change every other day, continue applying gentamicin to wound bed before applying ostomy bag. Continue IV Invaz until completion. If ostomy bag not effective for draining collection, may return to previous orders for wound dressing change. Orders followed. Education provided to Mrs. Morrison on handwashing and ostomy care techniques, voices and demonstrates understanding.   3/27/19: Follow up with Dr. Horton today in clinic for abdominal abscess, moderated tanish yellow drainge present on dressing.  Wound slowly improving, patient refused one-piece ostomy bag stated " no it's too messy." requested to return to previous dressing prior to ostomy bag placement- iodoform gauze, aquacel extra, sm abd, and mefix tape.  Silver nitrate x1 per Dr. Horton today in clinic. Rx given to patient for PO Zofran.     04/03/2019: F/u clinic visit with Dr. Horton. Abdominal wound improving in size, picture on file. Wound cultures from abdominal abscess collected and sent to lab/micro, pending results. Mrs. Morrison states going out of the country ([place]) at the end of April and plans to stay there for approximately two months. Dr." Clifford ordered IV antibiotic for two more weeks and new wound care dressing, orders followed.     4/9/2019: Clinic visit with Dr. Horton.  Per Dr. Horton, wound deep measurement increased, draining present. Wound cultures from abdominal wound taken and sent to lab/micro, pending results. C/o abdominal pain and itching around wound, Rx for betamethasone cream 01.% and Norco 5/325mg give in clinic by Dr. Horton. Wound care dressing orders followed. Continue IV antibiotic as previously ordered.  04/17/19: F/u for non-healing wound to abdomen. Culture report negative. Dr. Horton ordered 1 more week of IV antibiotics. Continue with topical wound care as ordered.     4/24/2019: Clinic visit with Dr. Horton for abdominal wound treatment. Wound improvement present, pictures on file. Last IV antibiotic today 4/24/2019, [facility] to discontinue PICC line from left upper arm on 4/25/2019. Per Lyubov Prince, going to [place] on Friday 4/26/2019 and out of the country ([place]) on Monday 4/29/2019. This nurse spoke to Shaunna at [employer] (IV antibiotic delivery company) and informed last dose of antibiotic is today. Dr. Horton ordered Hydrocodone-acetaminophen (Norco) 5-325 mg (32 tabs), Bentyl 10 mg (120 capsules) and Gentamycin ointment. Education provided on the importance of eating food that contains iron (to prevent anemia) as well as eating meat and taking her blood pressure medications (blood pressure medication) on time, voices understanding. All questions answered by Dr. Horton. [facility] outpatient confirmed Betamethasone cream ordered on 4/10/2019 is ready for . Instructions and education provided to Mrs. Morrison on abdominal wound dressing changes, hand washing techniques and medication use, effects and side effects, voices and demonstrates understanding. Discharged home on stable conditions, [facility] will give wound care clinic a call when she is back from [place] in few months from  now.     7/17/19: Readmit today to wound care clinic.  Patient was recently out of the country visiting family in [place].  Patient complains of pain and nausea as on prior visits.  Dr. Horton seen patient today discussed with patient again placing colostomy patient refused.  Culture of wound taken, Dr. Horton restarted patient on zofran po for nausea, and Norco for pain. [facility] restarted today in clinic on Mondays and Fridays and prn. Orders given to gently pack wound with aquacel ag rope, cover with aquacel extra, 4x4 gauze, small abd pad and mefix tape change dressing ever other day by [facility] and Prn per patient.     7/24/2019: Clinic visit with Dr. Horton. Continue with dressing change as ordered. Wound cultures reviewed with patient, lab work ordered by Dr. Horton, no fever present or reported at this time. C/o decrease appetite, medication periactin prescribed. Requesting needles for insulin pen, order prescribed by Dr. Horton.      Review of Systems   Constitutional: Negative.    HENT: Negative.    Eyes: Negative.    Respiratory: Negative.    Cardiovascular: Negative.    Gastrointestinal: Negative.    Genitourinary: Negative.    Musculoskeletal: Negative.    Skin: Negative.    Neurological: Negative.    Psychiatric/Behavioral: Negative.          Objective:        Physical Exam   Constitutional: She is oriented to person, place, and time. She appears well-developed and well-nourished.   HENT:   Head: Normocephalic.   Eyes: Pupils are equal, round, and reactive to light. Conjunctivae and EOM are normal.   Neck: Normal range of motion. Neck supple.   Cardiovascular: Normal rate, regular rhythm, normal heart sounds and intact distal pulses.   Pulmonary/Chest: Effort normal and breath sounds normal.   Abdominal: Soft. Bowel sounds are normal.       Musculoskeletal: Normal range of motion.   Neurological: She is alert and oriented to person, place, and time. She has normal reflexes.   Skin:  Skin is warm and dry.       Vitals:    07/24/19 0820   BP: 139/76   Pulse: 80   Temp: 98 °F (36.7 °C)       Assessment:           ICD-10-CM ICD-9-CM   1. Abscess of abdominal wall L02.211 682.2            Incision/Site 02/22/19 1146 Abdomen midline midline (Active)   02/22/19 1146    Present Prior to Hospital Arrival?:    Side:    Location: Abdomen   Orientation: midline   Incision Type: midline   Closure Method: Staples   Additional Comments:    Removal Indication and Assessment:    Wound Outcome:    Removal Indications:    Dressing Appearance Moist drainage 7/24/2019  8:00 AM   Drainage Amount Moderate 7/24/2019  8:00 AM   Drainage Characteristics/Odor Serosanguineous 7/24/2019  8:00 AM   Appearance Red;Moist 7/24/2019  8:00 AM   Red (%), Wound Tissue Color 100 % 7/24/2019  8:00 AM   Periwound Area Intact 7/24/2019  8:00 AM   Wound Edges Defined 7/24/2019  8:00 AM   Wound Length (cm) 0.4 cm 7/24/2019  8:00 AM   Wound Width (cm) 0.5 cm 7/24/2019  8:00 AM   Wound Depth (cm) 0.6 cm 7/24/2019  8:00 AM   Wound Volume (cm^3) 0.12 cm^3 7/24/2019  8:00 AM   Wound Surface Area (cm^2) 0.2 cm^2 7/24/2019  8:00 AM   Tunneling (depth (cm)/location) 2.2 cm at 1200 7/24/2019  8:00 AM   Care Cleansed with:;Sterile normal saline 7/24/2019  8:00 AM   Dressing Applied;Gauze;Calcium alginate;Abd pad 7/24/2019  8:00 AM   Packing other (see comments) 7/24/2019  8:00 AM   Periwound Care Absorptive dressing applied 7/24/2019  8:00 AM   Dressing Change Due 07/26/19 7/24/2019  8:00 AM         Medications Used in Clinic:  Lidocaine 2% gel or 4 % Topical solution: PRN  Other: Cavilon to chauncey wound, calmoseptine  Primary dressing: Gently pack wound with Aquacel Ag rope, cover with Aquacel Extra, 4x4 gauze, small ABD pad, secure with mefix tape.                  Plan:               Follow up in about 1 week (around 7/31/2019).  Other: RX given to patient for Periactin and insulin needle pen today in clinic  Home Health [facility] and Fax number:  [facility] [phone]    Culture noted , labs ok will not treat with antibiotics,   EOS calculated successfully. EOS Risk Factor: 0.5/1000 live births ([facility] national incidence); GA=39w;Temp=98.42; ROM=2.1; GBS='Positive'; Antibiotics='Broad spectrum antibiotics > 4 hrs prior to birth'

## 2023-01-26 ENCOUNTER — OFFICE VISIT (OUTPATIENT)
Dept: OBSTETRICS AND GYNECOLOGY | Facility: CLINIC | Age: 73
End: 2023-01-26
Payer: MEDICARE

## 2023-01-26 VITALS
DIASTOLIC BLOOD PRESSURE: 78 MMHG | WEIGHT: 159.19 LBS | SYSTOLIC BLOOD PRESSURE: 140 MMHG | BODY MASS INDEX: 29.3 KG/M2 | HEIGHT: 62 IN

## 2023-01-26 DIAGNOSIS — Z01.419 ENCOUNTER FOR GYNECOLOGICAL EXAMINATION WITHOUT ABNORMAL FINDING: Primary | ICD-10-CM

## 2023-01-26 DIAGNOSIS — Z12.31 SCREENING MAMMOGRAM, ENCOUNTER FOR: ICD-10-CM

## 2023-01-26 PROCEDURE — 4010F PR ACE/ARB THEARPY RXD/TAKEN: ICD-10-PCS | Mod: CPTII,S$GLB,, | Performed by: OBSTETRICS & GYNECOLOGY

## 2023-01-26 PROCEDURE — 3044F PR MOST RECENT HEMOGLOBIN A1C LEVEL <7.0%: ICD-10-PCS | Mod: CPTII,S$GLB,, | Performed by: OBSTETRICS & GYNECOLOGY

## 2023-01-26 PROCEDURE — 3078F DIAST BP <80 MM HG: CPT | Mod: CPTII,S$GLB,, | Performed by: OBSTETRICS & GYNECOLOGY

## 2023-01-26 PROCEDURE — 3008F PR BODY MASS INDEX (BMI) DOCUMENTED: ICD-10-PCS | Mod: CPTII,S$GLB,, | Performed by: OBSTETRICS & GYNECOLOGY

## 2023-01-26 PROCEDURE — 99999 PR PBB SHADOW E&M-EST. PATIENT-LVL III: CPT | Mod: PBBFAC,,, | Performed by: OBSTETRICS & GYNECOLOGY

## 2023-01-26 PROCEDURE — 3288F FALL RISK ASSESSMENT DOCD: CPT | Mod: CPTII,S$GLB,, | Performed by: OBSTETRICS & GYNECOLOGY

## 2023-01-26 PROCEDURE — 3066F NEPHROPATHY DOC TX: CPT | Mod: CPTII,S$GLB,, | Performed by: OBSTETRICS & GYNECOLOGY

## 2023-01-26 PROCEDURE — 1159F PR MEDICATION LIST DOCUMENTED IN MEDICAL RECORD: ICD-10-PCS | Mod: CPTII,S$GLB,, | Performed by: OBSTETRICS & GYNECOLOGY

## 2023-01-26 PROCEDURE — 3062F PR POS MACROALBUMINURIA RESULT DOCUMENTED/REVIEW: ICD-10-PCS | Mod: CPTII,S$GLB,, | Performed by: OBSTETRICS & GYNECOLOGY

## 2023-01-26 PROCEDURE — 3044F HG A1C LEVEL LT 7.0%: CPT | Mod: CPTII,S$GLB,, | Performed by: OBSTETRICS & GYNECOLOGY

## 2023-01-26 PROCEDURE — 3062F POS MACROALBUMINURIA REV: CPT | Mod: CPTII,S$GLB,, | Performed by: OBSTETRICS & GYNECOLOGY

## 2023-01-26 PROCEDURE — 1126F AMNT PAIN NOTED NONE PRSNT: CPT | Mod: CPTII,S$GLB,, | Performed by: OBSTETRICS & GYNECOLOGY

## 2023-01-26 PROCEDURE — G0101 PR CA SCREEN;PELVIC/BREAST EXAM: ICD-10-PCS | Mod: GZ,S$GLB,, | Performed by: OBSTETRICS & GYNECOLOGY

## 2023-01-26 PROCEDURE — 3077F PR MOST RECENT SYSTOLIC BLOOD PRESSURE >= 140 MM HG: ICD-10-PCS | Mod: CPTII,S$GLB,, | Performed by: OBSTETRICS & GYNECOLOGY

## 2023-01-26 PROCEDURE — 1101F PR PT FALLS ASSESS DOC 0-1 FALLS W/OUT INJ PAST YR: ICD-10-PCS | Mod: CPTII,S$GLB,, | Performed by: OBSTETRICS & GYNECOLOGY

## 2023-01-26 PROCEDURE — 4010F ACE/ARB THERAPY RXD/TAKEN: CPT | Mod: CPTII,S$GLB,, | Performed by: OBSTETRICS & GYNECOLOGY

## 2023-01-26 PROCEDURE — 3066F PR DOCUMENTATION OF TREATMENT FOR NEPHROPATHY: ICD-10-PCS | Mod: CPTII,S$GLB,, | Performed by: OBSTETRICS & GYNECOLOGY

## 2023-01-26 PROCEDURE — 3078F PR MOST RECENT DIASTOLIC BLOOD PRESSURE < 80 MM HG: ICD-10-PCS | Mod: CPTII,S$GLB,, | Performed by: OBSTETRICS & GYNECOLOGY

## 2023-01-26 PROCEDURE — 1159F MED LIST DOCD IN RCRD: CPT | Mod: CPTII,S$GLB,, | Performed by: OBSTETRICS & GYNECOLOGY

## 2023-01-26 PROCEDURE — 1101F PT FALLS ASSESS-DOCD LE1/YR: CPT | Mod: CPTII,S$GLB,, | Performed by: OBSTETRICS & GYNECOLOGY

## 2023-01-26 PROCEDURE — 3077F SYST BP >= 140 MM HG: CPT | Mod: CPTII,S$GLB,, | Performed by: OBSTETRICS & GYNECOLOGY

## 2023-01-26 PROCEDURE — 99999 PR PBB SHADOW E&M-EST. PATIENT-LVL III: ICD-10-PCS | Mod: PBBFAC,,, | Performed by: OBSTETRICS & GYNECOLOGY

## 2023-01-26 PROCEDURE — G0101 CA SCREEN;PELVIC/BREAST EXAM: HCPCS | Mod: GZ,S$GLB,, | Performed by: OBSTETRICS & GYNECOLOGY

## 2023-01-26 PROCEDURE — 3008F BODY MASS INDEX DOCD: CPT | Mod: CPTII,S$GLB,, | Performed by: OBSTETRICS & GYNECOLOGY

## 2023-01-26 PROCEDURE — 3288F PR FALLS RISK ASSESSMENT DOCUMENTED: ICD-10-PCS | Mod: CPTII,S$GLB,, | Performed by: OBSTETRICS & GYNECOLOGY

## 2023-01-26 PROCEDURE — 1126F PR PAIN SEVERITY QUANTIFIED, NO PAIN PRESENT: ICD-10-PCS | Mod: CPTII,S$GLB,, | Performed by: OBSTETRICS & GYNECOLOGY

## 2023-01-26 NOTE — PROGRESS NOTES
"Chief Complaint   Patient presents with    Annual Exam       HISTORY OF PRESENT ILLNESS:   Azucena Morrison is a 71 y.o. female  who presents for well woman exam.  No LMP recorded (lmp unknown). Patient is postmenopausal..  She is ,"menopausal at age 34. No bleeding or issues.     Past Medical History:   Diagnosis Date    Chronic kidney disease, stage 3     Diabetes mellitus     Diabetes mellitus, type 2     Hypertension     Malnutrition of moderate degree 2020    UTI (urinary tract infection) 2020   Urol  Past Surgical History:   Procedure Laterality Date    ABDOMINAL SURGERY      CATHETERIZATION OF URETER Bilateral 2020    Procedure: CATHETERIZATION, URETER;  Surgeon: Kvng Cunningham MD;  Location: [address];  Service: Urology;  Laterality: Bilateral;     SECTION, CLASSIC      x2    CHOLECYSTECTOMY      CLOSURE, ILEOSTOMY, OPEN N/A 2021    Procedure: CLOSURE, ILEOSTOMY, OPEN, ERAS low;  Surgeon: Fabiana Valiente MD;  Location: [address];  Service: Colon and Rectal;  Laterality: N/A;    COLON SURGERY      [facility]    COLONOSCOPY N/A 2018    Procedure: COLONOSCOPY;  Surgeon: Gibran Aguayo MD;  Location: [facility];  Service: Endoscopy;  Laterality: N/A;    COLOSTOMY Left     CYSTOSCOPY N/A 2020    Procedure: CYSTOSCOPY;  Surgeon: Kvng Cunningham MD;  Location: [address];  Service: Urology;  Laterality: N/A;    CYSTOSCOPY WITH URETEROSCOPY, RETROGRADE PYELOGRAPHY, AND INSERTION OF STENT Left 2019    Procedure: CYSTOSCOPY, WITH RETROGRADE PYELOGRAM AND URETERAL STENT INSERTION with placement of a muir cath;  Surgeon: Ulisses Horton MD;  Location: [facility];  Service: General;  Laterality: Left;    FLEXIBLE SIGMOIDOSCOPY N/A 2021    Procedure: SIGMOIDOSCOPY, FLEXIBLE;  Surgeon: Fabiana Valiente MD;  Location: [address];  Service: Colon and Rectal;  Laterality: N/A;    HYSTEROSCOPY WITH DILATION AND CURETTAGE OF " UTERUS N/A 2020    Procedure: HYSTEROSCOPY, WITH DILATION AND CURETTAGE OF UTERUS (MYOSURE);  Surgeon: Caitlyn Feng MD;  Location: [facility] OR;  Service: OB/GYN;  Laterality: N/A;  Guido notified , EF  video    ILEOSTOMY  2020    Procedure: CREATION, ILEOSTOMY;  Surgeon: Fabiana Valiente MD;  Location: [facility] OR 2ND FLR;  Service: Colon and Rectal;;    INCISION AND DRAINAGE OF ABSCESS N/A 2019    Procedure: INCISION AND DRAINAGE, ABSCESS;  Surgeon: Ulisses Horton MD;  Location: [facility] OR;  Service: General;  Laterality: N/A;    INCISION OF ABDOMINAL WALL N/A 8/10/2018    Procedure: INCISION, ABDOMINAL WALL;  Surgeon: Ulisses Horton MD;  Location: [facility] OR;  Service: General;  Laterality: N/A;    INCISIONAL HERNIA REPAIR Right     LOW ANTERIOR RESECTION OF COLON N/A 2020    Procedure: RESECTION, COLON, LOW ANTERIOR;  Surgeon: Fabiana Valiente MD;  Location: [facility] OR 2ND FLR;  Service: Colon and Rectal;  Laterality: N/A;    LYSIS OF ADHESIONS  2020    Procedure: LYSIS, ADHESIONS;  Surgeon: Fabiana Valiente MD;  Location: [facility] OR 2ND FLR;  Service: Colon and Rectal;;   ogy;  Laterality: Bilateral;     SECTION, CLASSIC      x2    CHOLECYSTECTOMY  1997    CLOSURE, ILEOSTOMY, OPEN N/A 2021    Procedure: CLOSURE, ILEOSTOMY, OPEN, ERAS low;  Surgeon: Fabiana Valiente MD;  Location: [facility] OR [facility] FLR;  Service: Colon and Rectal;  Laterality: N/A;    COLON SURGERY      [facility]    COLONOSCOPY N/A 2018    Procedure: COLONOSCOPY;  Surgeon: Gibran Aguayo MD;  Location: [facility] ENDO;  Service: Endoscopy;  Laterality: N/A;    COLOSTOMY Left     CYSTOSCOPY N/A 2020    Procedure: CYSTOSCOPY;  Surgeon: Kvng Cunningham MD;  Location: [facility] OR [facility] FLR;  Service: Urology;  Laterality: N/A;    CYSTOSCOPY WITH URETEROSCOPY, RETROGRADE PYELOGRAPHY, AND INSERTION OF STENT Left 2019    Procedure: CYSTOSCOPY, WITH RETROGRADE PYELOGRAM AND URETERAL STENT  INSERTION with placement of a muir cath;  Surgeon: Ulisses Horton MD;  Location: [facility] OR;  Service: General;  Laterality: Left;    FLEXIBLE SIGMOIDOSCOPY N/A 2021    Procedure: SIGMOIDOSCOPY, FLEXIBLE;  Surgeon: Fabiana Valiente MD;  Location: [facility] OR 2ND FLR;  Service: Colon and Rectal;  Laterality: N/A;    HYSTEROSCOPY WITH DILATION AND CURETTAGE OF UTERUS N/A 2020    Procedure: HYSTEROSCOPY, WITH DILATION AND CURETTAGE OF UTERUS (MYOSURE);  Surgeon: Caitlyn Feng MD;  Location: [facility] OR;  Service: OB/GYN;  Laterality: N/A;  Guido notified , EF  video    ILEOSTOMY  2020    Procedure: CREATION, ILEOSTOMY;  Surgeon: Fabiana Valiente MD;  Location: [facility] OR 2ND FLR;  Service: Colon and Rectal;;    INCISION AND DRAINAGE OF ABSCESS N/A 2019    Procedure: INCISION AND DRAINAGE, ABSCESS;  Surgeon: Ulisses Horton MD;  Location: [facility] OR;  Service: General;  Laterality: N/A;    INCISION OF ABDOMINAL WALL N/A 8/10/2018    Procedure: INCISION, ABDOMINAL WALL;  Surgeon: Ulisses Horton MD;  Location: [facility] OR;  Service: General;  Laterality: N/A;    INCISIONAL HERNIA REPAIR Right     LOW ANTERIOR RESECTION OF COLON N/A 2020    Procedure: RESECTION, COLON, LOW ANTERIOR;  Surgeon: Fabiana Valiente MD;  Location: [facility] OR 2ND FLR;  Service: Colon and Rectal;  Laterality: N/A;    LYSIS OF ADHESIONS  2020    Procedure: LYSIS, ADHESIONS;  Surgeon: Fabiana Valiente MD;  Location: [facility] OR 2ND FLR;  Service: Colon and Rectal;;        Socioeconomic History    Marital status: Single   Tobacco Use    Smoking status: Former Smoker     Types: Cigarettes     Quit date: 2000     Years since quittin.8    Smokeless tobacco: Never Used   Substance and Sexual Activity    Alcohol use: No    Drug use: No    Sexual activity: Not Currently       Family History   Problem Relation Age of Onset    Stroke Mother     Heart disease Mother     Hyperlipidemia Mother     Hypertension  Mother     Alcohol abuse Father     Stroke Sister     Diabetes Sister     Heart disease Sister     Hyperlipidemia Sister     Hypertension Sister     Diabetes Brother     Early death Grandchild     Anesthesia problems Neg Hx     Broken bones Neg Hx     Cancer Neg Hx          OB History    Para Term  AB Living   2 2 2         SAB IAB Ectopic Multiple Live Births                  # Outcome Date GA Lbr Rakesh/2nd Weight Sex Delivery Anes PTL Lv   2 Term            1 Term                COMPREHENSIVE GYN HISTORY:  PAP History: Denies abnormal Paps; 10/2020 NILm/HPv-  Infection History: Denies STDs. Denies PID  Benign History: Denies uterine fibroids. Denies ovarian cysts. Denies endometriosis Denies other conditions.  Cancer History: Denies cervical cancer. Denies uterine cancer or hyperplasia. Denies ovarian cancer. Denies vulvar cancer or pre-cancer. Denies vaginal cancer or pre-cancer. Denies breast cancer. Denies colon cancer.  Cycle: 17/irregular Q few months; stopped at 33 yo   10/2020 had PMB with EMS at 4 mm then EMB that showed scant tissue c/w endometrial hyperplasia then had HSC D&C that showed inactive endometrium, 6 months of provera  Repeat D&C 3/2022 and was benign     ROS:  GENERAL: Denies weight gain or weight loss. Feeling well overall.   SKIN: Denies rash or lesions.   HEAD: Denies headache.   NODES: Denies enlarged lymph nodes.   CHEST: Denies shortness of breath.   ABDOMEN: No abdominal pain, constipation, diarrhea, nausea, vomiting or rectal bleeding.   URINARY: No frequency, dysuria, hematuria, or burning on urination.  REPRODUCTIVE: See HPI.   BREASTS: The patient denies pain, lumps, or nipple discharge.       BP (!) 158/92   Wt 73.9 kg (162 lb 14.7 oz)   LMP  (LMP Unknown)   BMI 27.97 kg/m²     APPEARANCE: Well nourished, well developed, in no acute distress.  NECK: Neck symmetric .  ABDOMEN: Soft. Large vertical incisicion with scars c/w colostomy with reversal   BREASTS:  Symmetrical, no skin changes or visible lesions. No palpable masses, nipple discharge or adenopathy bilaterally.  PELVIC:   VULVA: No lesions. Normal female genitalia.  URETHRAL MEATUS: Normal size and location, no lesions, no prolapse.  URETHRA: No masses, tenderness, prolapse or scarring.  VAGINA: atrophic, no discharge, no significant cystocele or rectocele. No bleeding noted   CERVIX: No lesions and discharge.  UTERUS: Normal size, regular shape, mobile, non-tender, bladder base nontender.  ADNEXA: No masses or tenderness.  PERINEUM: Normal, no masses or lesions     Data Reviewed:     Lipid profile:   Lab Results   Component Value Date    CHOL 291 (H) 06/11/2008     Lab Results   Component Value Date    HDL 23 (L) 06/11/2008     Lab Results   Component Value Date    LDLCALC Invalid, Trig > 400 06/11/2008     Lab Results   Component Value Date    TRIG 626 (H) 08/20/2020    TRIG 348 (H) 08/17/2020    TRIG 192 (H) 08/10/2020     Lab Results   Component Value Date    CHOLHDL 7.9 (L) 06/11/2008         1. Encounter for annual routine gynecological examination    2. Endometrial hyperplasia        Plan: 1. Routine gyn annual exam. s/p normal breast exam and MMG ordered.   Pap with HPV cotesting up to date  STD testing: declined.   Lipid Profile, needed every 5 years, up to date. Fasting glucose, needed every 3 years, up to date. TSH, needed every 5 years, up to date.  Colonoscopy up to date. DEXA f/u with PCP    2. Will repeat endometrial sampling if bleeding or other issues occurs.     F/u in 1 yr or PRN

## 2023-02-13 ENCOUNTER — HOSPITAL ENCOUNTER (OUTPATIENT)
Dept: RADIOLOGY | Facility: HOSPITAL | Age: 73
Discharge: HOME | End: 2023-02-13
Attending: OBSTETRICS & GYNECOLOGY
Payer: MEDICARE

## 2023-02-13 DIAGNOSIS — Z12.31 SCREENING MAMMOGRAM, ENCOUNTER FOR: ICD-10-CM

## 2023-02-13 PROCEDURE — 77067 SCR MAMMO BI INCL CAD: CPT | Mod: 26,,, | Performed by: RADIOLOGY

## 2023-02-13 PROCEDURE — 77063 MAMMO DIGITAL SCREENING BILAT WITH TOMO: ICD-10-PCS | Mod: 26,,, | Performed by: RADIOLOGY

## 2023-02-13 PROCEDURE — 77067 MAMMO DIGITAL SCREENING BILAT WITH TOMO: ICD-10-PCS | Mod: 26,,, | Performed by: RADIOLOGY

## 2023-02-13 PROCEDURE — 77063 BREAST TOMOSYNTHESIS BI: CPT | Mod: 26,,, | Performed by: RADIOLOGY

## 2023-02-13 PROCEDURE — 77067 SCR MAMMO BI INCL CAD: CPT | Mod: TC

## 2023-02-23 PROBLEM — E66.9 OBESITY: Status: ACTIVE | Noted: 2023-02-23

## 2023-03-24 NOTE — PROGRESS NOTES
Covid Test Completed Pt Tolerated Well     Tanja Salinas  Gracie Kimball [facility] RPT

## 2023-04-02 ENCOUNTER — HOSPITAL ENCOUNTER (OUTPATIENT)
Facility: HOSPITAL | Age: 73
Discharge: HOME | End: 2023-04-04
Attending: EMERGENCY MEDICINE | Admitting: SURGERY
Payer: MEDICARE

## 2023-04-02 DIAGNOSIS — R19.7 DIARRHEA, UNSPECIFIED TYPE: ICD-10-CM

## 2023-04-02 DIAGNOSIS — R10.30 LOWER ABDOMINAL PAIN: ICD-10-CM

## 2023-04-02 DIAGNOSIS — Z90.49 HISTORY OF HEMICOLECTOMY: Chronic | ICD-10-CM

## 2023-04-02 DIAGNOSIS — K56.609 SMALL BOWEL OBSTRUCTION: Primary | ICD-10-CM

## 2023-04-02 DIAGNOSIS — E11.620 TYPE 2 DIABETES MELLITUS WITH DIABETIC DERMATITIS, WITHOUT LONG-TERM CURRENT USE OF INSULIN: ICD-10-CM

## 2023-04-02 DIAGNOSIS — R11.2 NAUSEA AND VOMITING, UNSPECIFIED VOMITING TYPE: ICD-10-CM

## 2023-04-02 LAB
ALBUMIN SERPL BCP-MCNC: 3.6 G/DL (ref 3.5–5.2)
ALP SERPL-CCNC: 110 U/L (ref 55–135)
ALT SERPL W/O P-5'-P-CCNC: 19 U/L (ref 10–44)
ANION GAP SERPL CALC-SCNC: 16 MMOL/L (ref 8–16)
ANION GAP SERPL CALC-SCNC: 17 MMOL/L (ref 8–16)
AST SERPL-CCNC: 20 U/L (ref 10–40)
BACTERIA #/AREA URNS HPF: ABNORMAL /HPF
BILIRUB SERPL-MCNC: 0.6 MG/DL (ref 0.1–1)
BILIRUB UR QL STRIP: NEGATIVE
BUN SERPL-MCNC: 19 MG/DL (ref 8–23)
BUN SERPL-MCNC: 20 MG/DL (ref 8–23)
CALCIUM SERPL-MCNC: 8.2 MG/DL (ref 8.7–10.5)
CALCIUM SERPL-MCNC: 9.7 MG/DL (ref 8.7–10.5)
CHLORIDE SERPL-SCNC: 100 MMOL/L (ref 95–110)
CHLORIDE SERPL-SCNC: 105 MMOL/L (ref 95–110)
CLARITY UR: CLEAR
CO2 SERPL-SCNC: 17 MMOL/L (ref 23–29)
CO2 SERPL-SCNC: 23 MMOL/L (ref 23–29)
COLOR UR: YELLOW
CREAT SERPL-MCNC: 0.9 MG/DL (ref 0.5–1.4)
CREAT SERPL-MCNC: 1.3 MG/DL (ref 0.5–1.4)
ERYTHROCYTE [DISTWIDTH] IN BLOOD BY AUTOMATED COUNT: 13.4 % (ref 11.5–14.5)
EST. GFR  (NO RACE VARIABLE): 44 ML/MIN/1.73 M^2
EST. GFR  (NO RACE VARIABLE): >60 ML/MIN/1.73 M^2
GLUCOSE SERPL-MCNC: 132 MG/DL (ref 70–110)
GLUCOSE SERPL-MCNC: 99 MG/DL (ref 70–110)
GLUCOSE UR QL STRIP: ABNORMAL
HCT VFR BLD AUTO: 45.1 % (ref 37–48.5)
HGB BLD-MCNC: 15.3 G/DL (ref 12–16)
HGB UR QL STRIP: NEGATIVE
HYALINE CASTS #/AREA URNS LPF: 1 /LPF
KETONES UR QL STRIP: ABNORMAL
LACTATE SERPL-SCNC: 1.9 MMOL/L (ref 0.5–2.2)
LEUKOCYTE ESTERASE UR QL STRIP: NEGATIVE
LIPASE SERPL-CCNC: 51 U/L (ref 4–60)
MAGNESIUM SERPL-MCNC: 1.6 MG/DL (ref 1.6–2.6)
MCH RBC QN AUTO: 28.2 PG (ref 27–31)
MCHC RBC AUTO-ENTMCNC: 33.9 G/DL (ref 32–36)
MCV RBC AUTO: 83 FL (ref 82–98)
MICROSCOPIC COMMENT: ABNORMAL
NITRITE UR QL STRIP: NEGATIVE
PH UR STRIP: 6 [PH] (ref 5–8)
PHOSPHATE SERPL-MCNC: 3.9 MG/DL (ref 2.7–4.5)
PLATELET # BLD AUTO: 298 K/UL (ref 150–450)
PMV BLD AUTO: 8.7 FL (ref 9.2–12.9)
POTASSIUM SERPL-SCNC: 3.4 MMOL/L (ref 3.5–5.1)
POTASSIUM SERPL-SCNC: 4 MMOL/L (ref 3.5–5.1)
PROT SERPL-MCNC: 8.5 G/DL (ref 6–8.4)
PROT UR QL STRIP: ABNORMAL
RBC # BLD AUTO: 5.42 M/UL (ref 4–5.4)
RBC #/AREA URNS HPF: 2 /HPF (ref 0–4)
SODIUM SERPL-SCNC: 138 MMOL/L (ref 136–145)
SODIUM SERPL-SCNC: 140 MMOL/L (ref 136–145)
SP GR UR STRIP: >1.03 (ref 1–1.03)
SQUAMOUS #/AREA URNS HPF: 2 /HPF
URN SPEC COLLECT METH UR: ABNORMAL
UROBILINOGEN UR STRIP-ACNC: NEGATIVE EU/DL
WBC # BLD AUTO: 5.03 K/UL (ref 3.9–12.7)
WBC #/AREA URNS HPF: 7 /HPF (ref 0–5)
YEAST URNS QL MICRO: ABNORMAL

## 2023-04-02 PROCEDURE — 63600175 PHARM REV CODE 636 W HCPCS: Performed by: STUDENT IN AN ORGANIZED HEALTH CARE EDUCATION/TRAINING PROGRAM

## 2023-04-02 PROCEDURE — 25000003 PHARM REV CODE 250: Performed by: EMERGENCY MEDICINE

## 2023-04-02 PROCEDURE — 83605 ASSAY OF LACTIC ACID: CPT | Performed by: EMERGENCY MEDICINE

## 2023-04-02 PROCEDURE — 83735 ASSAY OF MAGNESIUM: CPT | Performed by: STUDENT IN AN ORGANIZED HEALTH CARE EDUCATION/TRAINING PROGRAM

## 2023-04-02 PROCEDURE — 96376 TX/PRO/DX INJ SAME DRUG ADON: CPT

## 2023-04-02 PROCEDURE — 81000 URINALYSIS NONAUTO W/SCOPE: CPT | Performed by: EMERGENCY MEDICINE

## 2023-04-02 PROCEDURE — 63600175 PHARM REV CODE 636 W HCPCS: Performed by: EMERGENCY MEDICINE

## 2023-04-02 PROCEDURE — 93010 EKG 12-LEAD: ICD-10-PCS | Mod: ,,, | Performed by: INTERNAL MEDICINE

## 2023-04-02 PROCEDURE — 93010 ELECTROCARDIOGRAM REPORT: CPT | Mod: ,,, | Performed by: INTERNAL MEDICINE

## 2023-04-02 PROCEDURE — 96375 TX/PRO/DX INJ NEW DRUG ADDON: CPT

## 2023-04-02 PROCEDURE — 93005 ELECTROCARDIOGRAM TRACING: CPT

## 2023-04-02 PROCEDURE — 96361 HYDRATE IV INFUSION ADD-ON: CPT

## 2023-04-02 PROCEDURE — 25500020 PHARM REV CODE 255: Performed by: EMERGENCY MEDICINE

## 2023-04-02 PROCEDURE — 80048 BASIC METABOLIC PNL TOTAL CA: CPT | Mod: XB | Performed by: STUDENT IN AN ORGANIZED HEALTH CARE EDUCATION/TRAINING PROGRAM

## 2023-04-02 PROCEDURE — 83690 ASSAY OF LIPASE: CPT | Performed by: EMERGENCY MEDICINE

## 2023-04-02 PROCEDURE — 80053 COMPREHEN METABOLIC PANEL: CPT | Performed by: EMERGENCY MEDICINE

## 2023-04-02 PROCEDURE — G0378 HOSPITAL OBSERVATION PER HR: HCPCS

## 2023-04-02 PROCEDURE — 85027 COMPLETE CBC AUTOMATED: CPT | Performed by: EMERGENCY MEDICINE

## 2023-04-02 PROCEDURE — 99285 EMERGENCY DEPT VISIT HI MDM: CPT | Mod: 25

## 2023-04-02 PROCEDURE — 84100 ASSAY OF PHOSPHORUS: CPT | Performed by: STUDENT IN AN ORGANIZED HEALTH CARE EDUCATION/TRAINING PROGRAM

## 2023-04-02 RX ORDER — GLUCOSAM/CHON-MSM1/C/MANG/BOSW 500-416.6
TABLET ORAL
COMMUNITY
Start: 2022-11-11

## 2023-04-02 RX ORDER — OMEPRAZOLE 20 MG/1
20 CAPSULE, DELAYED RELEASE ORAL DAILY
COMMUNITY
End: 2023-12-20 | Stop reason: CLARIF

## 2023-04-02 RX ORDER — ONDANSETRON 2 MG/ML
4 INJECTION INTRAMUSCULAR; INTRAVENOUS
Status: COMPLETED | OUTPATIENT
Start: 2023-04-02 | End: 2023-04-02

## 2023-04-02 RX ORDER — SODIUM CHLORIDE, SODIUM LACTATE, POTASSIUM CHLORIDE, CALCIUM CHLORIDE 600; 310; 30; 20 MG/100ML; MG/100ML; MG/100ML; MG/100ML
INJECTION, SOLUTION INTRAVENOUS CONTINUOUS
Status: ACTIVE | OUTPATIENT
Start: 2023-04-03 | End: 2023-04-03

## 2023-04-02 RX ORDER — INSULIN ASPART 100 [IU]/ML
0-5 INJECTION, SOLUTION INTRAVENOUS; SUBCUTANEOUS EVERY 6 HOURS PRN
Status: DISCONTINUED | OUTPATIENT
Start: 2023-04-02 | End: 2023-04-04 | Stop reason: HOSPADM

## 2023-04-02 RX ORDER — DULAGLUTIDE 3 MG/.5ML
3 INJECTION, SOLUTION SUBCUTANEOUS
COMMUNITY
Start: 2023-03-13 | End: 2023-12-20 | Stop reason: CLARIF

## 2023-04-02 RX ORDER — PRAVASTATIN SODIUM 40 MG/1
40 TABLET ORAL NIGHTLY
Status: DISCONTINUED | OUTPATIENT
Start: 2023-04-03 | End: 2023-04-04 | Stop reason: HOSPADM

## 2023-04-02 RX ORDER — POTASSIUM CHLORIDE 7.45 MG/ML
10 INJECTION INTRAVENOUS
Status: COMPLETED | OUTPATIENT
Start: 2023-04-03 | End: 2023-04-03

## 2023-04-02 RX ORDER — ONDANSETRON 2 MG/ML
4 INJECTION INTRAMUSCULAR; INTRAVENOUS ONCE
Status: COMPLETED | OUTPATIENT
Start: 2023-04-02 | End: 2023-04-02

## 2023-04-02 RX ORDER — CEPHALEXIN 500 MG/1
500 CAPSULE ORAL EVERY 12 HOURS
Qty: 10 CAPSULE | Refills: 0 | Status: SHIPPED | OUTPATIENT
Start: 2023-04-02 | End: 2023-04-02 | Stop reason: CLARIF

## 2023-04-02 RX ORDER — ONDANSETRON 4 MG/1
4 TABLET, FILM COATED ORAL EVERY 12 HOURS PRN
COMMUNITY
Start: 2023-02-20 | End: 2023-04-02 | Stop reason: CLARIF

## 2023-04-02 RX ORDER — CARVEDILOL PHOSPHATE 40 MG/1
40 CAPSULE, EXTENDED RELEASE ORAL
COMMUNITY
Start: 2023-02-27 | End: 2023-04-02 | Stop reason: DRUGHIGH

## 2023-04-02 RX ORDER — CARVEDILOL PHOSPHATE 80 MG/1
80 CAPSULE, EXTENDED RELEASE ORAL DAILY
COMMUNITY

## 2023-04-02 RX ORDER — LOSARTAN POTASSIUM 50 MG/1
50 TABLET ORAL DAILY
Status: DISCONTINUED | OUTPATIENT
Start: 2023-04-03 | End: 2023-04-04 | Stop reason: HOSPADM

## 2023-04-02 RX ORDER — MORPHINE SULFATE 4 MG/ML
4 INJECTION, SOLUTION INTRAMUSCULAR; INTRAVENOUS
Status: COMPLETED | OUTPATIENT
Start: 2023-04-02 | End: 2023-04-02

## 2023-04-02 RX ORDER — SODIUM CHLORIDE 0.9 % (FLUSH) 0.9 %
10 SYRINGE (ML) INJECTION
Status: DISCONTINUED | OUTPATIENT
Start: 2023-04-02 | End: 2023-04-04 | Stop reason: HOSPADM

## 2023-04-02 RX ORDER — GLUCAGON 1 MG
1 KIT INJECTION
Status: DISCONTINUED | OUTPATIENT
Start: 2023-04-02 | End: 2023-04-04 | Stop reason: HOSPADM

## 2023-04-02 RX ORDER — TALC
6 POWDER (GRAM) TOPICAL NIGHTLY PRN
Status: DISCONTINUED | OUTPATIENT
Start: 2023-04-02 | End: 2023-04-04 | Stop reason: HOSPADM

## 2023-04-02 RX ORDER — ONDANSETRON 4 MG/1
4 TABLET, FILM COATED ORAL EVERY 6 HOURS
Qty: 12 TABLET | Refills: 0 | Status: SHIPPED | OUTPATIENT
Start: 2023-04-02 | End: 2023-04-02 | Stop reason: CLARIF

## 2023-04-02 RX ADMIN — ONDANSETRON HYDROCHLORIDE 4 MG: 2 SOLUTION INTRAMUSCULAR; INTRAVENOUS at 09:04

## 2023-04-02 RX ADMIN — MORPHINE SULFATE 4 MG: 4 INJECTION INTRAVENOUS at 04:04

## 2023-04-02 RX ADMIN — ONDANSETRON HYDROCHLORIDE 4 MG: 2 SOLUTION INTRAMUSCULAR; INTRAVENOUS at 04:04

## 2023-04-02 RX ADMIN — SODIUM CHLORIDE 1000 ML: 0.9 INJECTION, SOLUTION INTRAVENOUS at 04:04

## 2023-04-02 RX ADMIN — SODIUM CHLORIDE, POTASSIUM CHLORIDE, SODIUM LACTATE AND CALCIUM CHLORIDE: 600; 310; 30; 20 INJECTION, SOLUTION INTRAVENOUS at 11:04

## 2023-04-02 RX ADMIN — SODIUM CHLORIDE, POTASSIUM CHLORIDE, SODIUM LACTATE AND CALCIUM CHLORIDE 1000 ML: 600; 310; 30; 20 INJECTION, SOLUTION INTRAVENOUS at 09:04

## 2023-04-02 RX ADMIN — IOHEXOL 75 ML: 350 INJECTION, SOLUTION INTRAVENOUS at 05:04

## 2023-04-02 NOTE — PHARMACY MED REC
"  Admission Medication History     The home medication history was taken by Laurie Chase CPhT.    Medication history obtained from, Patient Verified via Family Member Interpreting    You may go to "Admission" then "Reconcile Home Medications" tabs to review and/or act upon these items.     The home medication list has been updated by the Pharmacy department.   Please read ALL comments highlighted in yellow.   Please address this information as you see fit.    Feel free to contact us if you have any questions or require assistance.      The medications listed below were removed from the home medication list.  Please reorder if appropriate:  Patient reports no longer taking the following medication(s):  Amoxicillin 500 mg  Azithromycin 250 mg  Ciclopirox 8% solution  Fenofibrate 200 mg  Naproxen 500 mg  Protonix 40 mg  Tramadol 50 mg        Laurie Chase CPhT.  Ext [phone]             .        "

## 2023-04-02 NOTE — ED PROVIDER NOTES
Emergency Department Encounter  Provider Note    Azucena Morrison  [id number]  2023    Evaluation:    History:     Chief Complaint   Patient presents with    Abdominal Pain     Urination +blood, +vomiting, just came back from [facility] today and was seen in [facility] and dx with hernia. Pain is located across lower abdomen     Azucena Morrison is a 72 y.o. female who has a past medical history of Chronic kidney disease, stage 3, Diabetes mellitus, Diabetes mellitus, type 2, Hematuria, unspecified, Hypercalcemia, Hypertension, Malnutrition of moderate degree (2020), Mixed hyperlipidemia (2022), Proteinuria, Renal cyst, left, and UTI (urinary tract infection) (2020).    The patient presents to the ED due to abdominal pain.   Patient reports symptoms started while in [place]. She was seen in the hospital and diagnosed with a hernia. Family reports she was given pain medication injections that helped with pain. She just arrived back in the , and they brought her straight to the ED for evaluation.  Patient endorses N/V as well as dysuria.   No fever, CP, SOB, or any other concerns.     #[id number] used for professional medical interpretation.       Past Medical History:   Diagnosis Date    Chronic kidney disease, stage 3     Diabetes mellitus     Diabetes mellitus, type 2     Hematuria, unspecified     Hypercalcemia     Hypertension     Malnutrition of moderate degree 2020    Mixed hyperlipidemia 2022    Proteinuria     Renal cyst, left     UTI (urinary tract infection) 2020     Past Surgical History:   Procedure Laterality Date    ABDOMINAL SURGERY      CATHETERIZATION OF URETER Bilateral 2020    Procedure: CATHETERIZATION, URETER;  Surgeon: Kvng Cunningham MD;  Location: [facility] OR [address] [place] [address];  Service: Urology;  Laterality: Bilateral;     SECTION, CLASSIC      x2    CHOLECYSTECTOMY      CLOSURE, ILEOSTOMY, OPEN N/A 2021    Procedure: CLOSURE,  ILEOSTOMY, OPEN, ERAS low;  Surgeon: Fabiana Valiente MD;  Location: [facility] OR [facility];  Service: Colon and Rectal;  Laterality: N/A;    COLON SURGERY  2015    \[facility] [place] [facility]\""    COLONOSCOPY N/A 6/4/2018    Procedure: COLONOSCOPY;  Surgeon: Gibran Aguayo MD;  Location: [facility] ENDO;  Service: Endoscopy;  Laterality: N/A;    COLOSTOMY Left 2015    CYSTOSCOPY N/A 7/31/2020    Procedure: CYSTOSCOPY;  Surgeon: Kvng Cunningham MD;  [facility];  Service: Urology;  Laterality: N/A;    CYSTOSCOPY WITH URETEROSCOPY, RETROGRADE PYELOGRAPHY, AND INSERTION OF STENT Left 2/22/2019    Procedure: CYSTOSCOPY, WITH RETROGRADE PYELOGRAM AND URETERAL STENT INSERTION with placement of a muir cath;  Surgeon: Ulisses Horton MD;  Location: [facility] OR;  Service: General;  Laterality: Left;    FLEXIBLE SIGMOIDOSCOPY N/A 2/9/2021    Procedure: SIGMOIDOSCOPY, FLEXIBLE;  Surgeon: Fabiana Valiente MD;  [facility];  Service: Colon and Rectal;  Laterality: N/A;    HYSTEROSCOPY WITH DILATION AND CURETTAGE OF UTERUS N/A 12/9/2020    Procedure: HYSTEROSCOPY, WITH DILATION AND CURETTAGE OF UTERUS (MYOSURE);  Surgeon: Caitlyn Feng MD;  Location: [facility] OR;  Service: OB/GYN;  Laterality: N/A;  Guido notified 11/30,   video    HYSTEROSCOPY WITH DILATION AND CURETTAGE OF UTERUS N/A 3/4/2022    Procedure: HYSTEROSCOPY, WITH DILATION AND CURETTAGE OF UTERUS;  Surgeon: Caitlyn Feng MD;  Location: [facility] OR;  Service: OB/GYN;  Laterality: N/A;  Alexander notified CW 3/3  video confirmed    ILEOSTOMY  7/31/2020    Procedure: CREATION, ILEOSTOMY;  Surgeon: Fabiana Valiente MD;  [facility];  Service: Colon and Rectal;;    INCISION AND DRAINAGE OF ABSCESS N/A 12/22/2019    Procedure: INCISION AND DRAINAGE, ABSCESS;  Surgeon: Ulisses Horton MD;  Location: [facility] OR;  Service: General;  Laterality: N/A;    INCISION OF ABDOMINAL WALL N/A 8/10/2018    Procedure: INCISION, ABDOMINAL WALL;  Surgeon: Ulisses ABAD  MD Clifford;  Location: [facility] OR;  Service: General;  Laterality: N/A;    INCISIONAL HERNIA REPAIR Right 2010    LOW ANTERIOR RESECTION OF COLON N/A 2020    Procedure: RESECTION, COLON, LOW ANTERIOR;  Surgeon: Fabiana Valiente MD;  [facility];  Service: Colon and Rectal;  Laterality: N/A;    LYSIS OF ADHESIONS  2020    Procedure: LYSIS, ADHESIONS;  Surgeon: Fabiana Valiente MD;  Location: [facility] OR [facility];  Service: Colon and Rectal;;     Family History   Problem Relation Age of Onset    Stroke Mother     Heart disease Mother     Hyperlipidemia Mother     Hypertension Mother     Alcohol abuse Father     Stroke Sister     Diabetes Sister     Heart disease Sister     Hyperlipidemia Sister     Hypertension Sister     Diabetes Brother     Early death Grandchild     Anesthesia problems Neg Hx     Broken bones Neg Hx     Cancer Neg Hx      Social History     Socioeconomic History    Marital status: Single   Tobacco Use    Smoking status: Former     Types: Cigarettes     Quit date: 2000     Years since quittin.2    Smokeless tobacco: Never   Substance and Sexual Activity    Alcohol use: No    Drug use: No    Sexual activity: Not Currently     Review of patient's allergies indicates:   Allergen Reactions    Chlorhexidine gluconate Rash     Chlorhexidine/alcohol wipes. Rash and blistering.       Review of Systems   Gastrointestinal:  Positive for abdominal pain, nausea and vomiting. Negative for constipation and diarrhea.     Physical Exam:     Initial Vitals [23 1443]   BP Pulse Resp Temp SpO2   (!) 192/103 102 20 98.2 °F (36.8 °C) 99 %      MAP       --         Physical Exam    Nursing note and vitals reviewed.  Constitutional: She appears well-developed and well-nourished. She is not diaphoretic. No distress.   HENT:   Head: Normocephalic and atraumatic.   Mouth/Throat: Oropharynx is clear and moist.   Eyes: EOM are normal. Pupils are equal, round, and reactive to light.   Neck:  No tracheal deviation present.   Cardiovascular:  Normal rate, regular rhythm, normal heart sounds and intact distal pulses.           Pulmonary/Chest: Breath sounds normal. No stridor. No respiratory distress. She has no wheezes.   Abdominal: Abdomen is soft and protuberant. She exhibits no distension and no mass. Bowel sounds are decreased. A surgical scar is present.   Extensive surgical scars to abdomen, well-healed.  There is no rebound and no guarding.   Musculoskeletal:         General: No edema. Normal range of motion.     Neurological: She is alert and oriented to person, place, and time. She has normal strength. No cranial nerve deficit or sensory deficit.   Skin: Skin is warm and dry. Capillary refill takes less than 2 seconds. No pallor.   Psychiatric: She has a normal mood and affect. Her behavior is normal. Thought content normal.       ED Course:   Procedures    Medical Decision Making:    History Acquisition:   Additional historians utilized:  Family member at bedside, see HPI    Prior medical records were reviewed:   Gen Surg visit 02/2023 for ventral hernia  OB-GYN visit 01/2023 for annual exam  Nephrology visit 01/2023 for f/u CKD    The patient's list of active medical problems, social history, medications, and allergies as documented has been reviewed.     Differential Diagnoses:   Based on available information and initial assessment, Differential Diagnosis includes, but is not limited to:  AAA, aortic dissection, mesenteric ischemia, perforated viscous, MI/ACS, SBO/volvulus, incarcerated/strangulated hernia, intussusception, ileus, appendicitis, cholecystitis, cholangitis, diverticulitis, esophagitis, hepatitis, nephrolithiasis, pancreatitis, gastroenteritis, colitis, IBD/IBS, biliary colic, GERD, PUD, constipation, UTI/pyelonephritis,  disorder.        EKG:   EKG interpretation by ED attending physician:  NSR, rate 97, occiasional PACs, no ST changes, no ischemia, normal  intervals.  Compared with prior EKG dated 08/2022, grossly stable without significant change.      Labs:     Labs Reviewed   CBC WITHOUT DIFFERENTIAL - Abnormal; Notable for the following components:       Result Value    RBC 5.42 (*)     MPV 8.7 (*)     All other components within normal limits   COMPREHENSIVE METABOLIC PANEL - Abnormal; Notable for the following components:    Glucose 132 (*)     Total Protein 8.5 (*)     Anion Gap 17 (*)     eGFR 44 (*)     All other components within normal limits   URINALYSIS, REFLEX TO URINE CULTURE - Abnormal; Notable for the following components:    Specific Gravity, UA >1.030 (*)     Protein, UA 3+ (*)     Glucose, UA 4+ (*)     Ketones, UA 1+ (*)     All other components within normal limits    Narrative:     Specimen Source->Urine   URINALYSIS MICROSCOPIC - Abnormal; Notable for the following components:    WBC, UA 7 (*)     All other components within normal limits    Narrative:     Specimen Source->Urine   BASIC METABOLIC PANEL - Abnormal; Notable for the following components:    Potassium 3.4 (*)     CO2 17 (*)     Calcium 8.2 (*)     All other components within normal limits   PROTEIN / CREATININE RATIO, URINE - Abnormal; Notable for the following components:    Protein, Urine Random 194 (*)     Prot/Creat Ratio, Urine 1.67 (*)     All other components within normal limits    Narrative:     Specimen Source->Urine   COMPREHENSIVE METABOLIC PANEL - Abnormal; Notable for the following components:    CO2 16 (*)     Calcium 8.2 (*)     Albumin 2.5 (*)     All other components within normal limits   LIPASE   LACTIC ACID, PLASMA   MAGNESIUM   PHOSPHORUS   CBC W/ AUTO DIFFERENTIAL   MAGNESIUM   PHOSPHORUS   OCCULT BLOOD X 1, STOOL   WBC, STOOL   STOOL EXAM-OVA,CYSTS,PARASITES   POCT GLUCOSE   POCT GLUCOSE     Independent review of the labs ordered include:   See ED course    Imaging:     Imaging Results               CT Abdomen Pelvis With Contrast (Final result)  Result time  04/02/23 18:13:16      Final result by Kenan Leach MD (04/02/23 18:13:16)                   Impression:      1.  Postoperative changes in the distal small bowel with multiple air-fluid levels and wall thickening of the small bowel loops with no abrupt transition point identified.  Overall findings suggestive of at least partial small bowel obstruction.  Continued short-term follow-up suggested.    2.  Copious amount of fluid within the large and small bowel with abnormal wall thickening involving both the large and small bowel loops.  Some component of enterocolitis suspected.    3.  No evidence of a drainable collection in the abdomen or pelvis.    3.  Additional findings as above.    This report was flagged in Epic as abnormal.      Electronically signed by: Kenan Leach MD  Date:    04/02/2023  Time:    18:13               Narrative:    EXAMINATION:  CT ABDOMEN PELVIS WITH CONTRAST    CLINICAL HISTORY:  Abdominal abscess/infection suspected;Bowel obstruction suspected;    TECHNIQUE:  Low dose axial images, sagittal and coronal reformations were obtained from the lung bases to the pubic symphysis following the IV administration of 75 mL of Omnipaque 350 .  Oral contrast was not given.    COMPARISON:  CT scan of the abdomen pelvis dated 03/10/2021.    CT abdomen pelvis dated 10/12/2020    CT abdomen pelvis dated 09/16/2020.    FINDINGS:  There are no pleural effusions.  There is no evidence of a pneumothorax.  No airspace opacity is present.  There are bibasilar ground-glass airspace opacities.    The heart is enlarged.  There are coronary artery calcifications.  There is normal tapering of the abdominal aorta.  There are extensive calcifications along the course of the abdominal aorta is branch vessels.  The celiac trunk, the SMA, and ARPIT are within normal limits.  The portal veins and mesenteric veins are within normal limits.  The IVC and the remainder of the venous structures are within normal limits.    There  is no evidence of lymphadenopathy in the abdomen or pelvis.    The esophagus, stomach, and duodenum are within normal limits.  There are postop changes involving the distal small bowel loops.  There is distention and wall thickening along with multiple air-fluid levels within the small bowel loops.  No definitive transition point is identified.  There are postop changes in the in the rectosigmoid colon.  There is copious amount of stool within the small and large bowel loops with abnormal wall thickening.    The liver is unremarkable.  The patient is status post cholecystectomy.  There is unchanged dilatation of the extrahepatic biliary tree.  The spleen is unremarkable.  The pancreas is within normal limits.  The adrenal glands are unremarkable.    The right kidney is unremarkable.  There are two simple cysts in the left kidney.  The ureters and urinary bladder are unremarkable.  The uterus and adnexal structures are within normal limits.    There is no evidence of free fluid in the abdomen or pelvis.  There has been resolution of the previous collection in the right anterior pelvis.  There is no evidence of free air.  There is no pneumatosis.  No portal venous air is identified.    The psoas margins are unremarkable.  There is an umbilical hernia present.  No significant bowel component identified.  There are degenerative changes in the osseous structures.                                         Additional Consideration:   Additional testing considered during clinical course: none    Social determinants of health considered during development of treatment plan include: poor access to care, language barrier    Current co-morbidities considered which impacted clinical decision making: multiple prior abdominal surgeries, DM, HTN, CKD    Case discussed with additional provider: Dr. Horton, general surgery, for admission and management of SBO     Medications   lactated ringers infusion ( Intravenous Verify Only 4/3/23  0600)   lactated ringers infusion (0 mL/hr Intravenous Stopped 4/3/23 2034)   morphine injection 4 mg (4 mg Intravenous Given 4/2/23 1622)   ondansetron injection 4 mg (4 mg Intravenous Given 4/2/23 1622)   sodium chloride 0.9% bolus 1,000 mL 1,000 mL (0 mLs Intravenous Stopped 4/2/23 1817)   iohexoL (OMNIPAQUE 350) injection 75 mL (75 mLs Intravenous Given 4/2/23 1713)   ondansetron injection 4 mg (4 mg Intravenous Given 4/2/23 2109)   lactated ringers bolus 1,000 mL (0 mLs Intravenous Stopped 4/2/23 2213)   potassium chloride 10 mEq in 100 mL IVPB (0 mEq Intravenous Stopped 4/3/23 0428)   magnesium sulfate 2g in water 50mL IVPB (premix) (0 g Intravenous Stopped 4/3/23 1407)   potassium bicarbonate disintegrating tablet 25 mEq (25 mEq Oral Given 4/4/23 0853)      Medical Decision Making:   Initial Assessment:   73 yo F with multiple prior abdominal surgeries presents to ED with abdominal pain, N/V.  Will obtain labs, UA, CT A/P, and reassess.   Independently Interpreted Test(s):   I have ordered and independently interpreted X-rays - see prior notes.  I have ordered and independently interpreted EKG Reading(s) - see prior notes  Clinical Tests:   Lab Tests: Ordered and Reviewed  Radiological Study: Ordered and Reviewed  Medical Tests: Reviewed and Ordered  ED Management:  General Surgery consulted, Dr. Horton will admit for further management.     On re-evaluation, the patient's status has improved.  At this time, I believe the patient should be admitted to the hospital for further evaluation and management of SBO.  Surgery service was contacted and the case was discussed.   The consulting physician/team agrees with plan and will admit under their service.   The patient and family were updated with test results, overall impression, and further plan of care. All questions were answered. The patient expressed understanding and agrees with the current plan.         ED Course as of 04/14/23 1430   Sun Apr 02, 2023    1737 CBC Without Differential(!)  Unremarkable  [SS]   1737 Lactic Acid, Plasma  WNL [SS]   1737 Comprehensive metabolic panel(!)  Unremarkable  [SS]   1737 Lipase  WNL [SS]   1737 Urinalysis, Reflex to Urine Culture Urine, Clean Catch(!)  4+ glucose, no LE or nitrites  [SS]   1738 Urinalysis Microscopic(!)  7 WBC, some squamous cells, possibly consistent with mild UTI [SS]   1739 Still awaiting CT results  [SS]   1833 CT Abdomen Pelvis With Contrast(!)  CT independently interpreted: distended small bowel loops concerning for developing SBO. No free air.  Agree with radiologist interpretation.    [SS]   1833 Called Dr. Horton with General Surgery, no answer, left message.  [SS]      ED Course User Index  [SS] Oral Cates MD         Clinical Impression:       ICD-10-CM ICD-9-CM   1. Small bowel obstruction  K56.609 560.9   2. Lower abdominal pain  R10.30 789.09   3. Nausea and vomiting, unspecified vomiting type  R11.2 787.01   4. Type 2 diabetes mellitus with diabetic dermatitis, without long-term current use of insulin  E11.620 250.80   5. History of hemicolectomy  Z90.49 V15.29            ED Disposition Condition    Observation Stable               Oral Cates MD  04/03/23 1247       Oral Cates MD  04/14/23 1430

## 2023-04-03 LAB
ALBUMIN SERPL BCP-MCNC: 2.5 G/DL (ref 3.5–5.2)
ALP SERPL-CCNC: 74 U/L (ref 55–135)
ALT SERPL W/O P-5'-P-CCNC: 12 U/L (ref 10–44)
ANION GAP SERPL CALC-SCNC: 13 MMOL/L (ref 8–16)
AST SERPL-CCNC: 16 U/L (ref 10–40)
BASOPHILS NFR BLD: 0 % (ref 0–1.9)
BILIRUB SERPL-MCNC: 0.4 MG/DL (ref 0.1–1)
BUN SERPL-MCNC: 21 MG/DL (ref 8–23)
CALCIUM SERPL-MCNC: 8.2 MG/DL (ref 8.7–10.5)
CHLORIDE SERPL-SCNC: 107 MMOL/L (ref 95–110)
CO2 SERPL-SCNC: 16 MMOL/L (ref 23–29)
CREAT SERPL-MCNC: 1 MG/DL (ref 0.5–1.4)
CREAT UR-MCNC: 116 MG/DL (ref 15–325)
DIFFERENTIAL METHOD: NORMAL
EOSINOPHIL NFR BLD: 3 % (ref 0–8)
ERYTHROCYTE [DISTWIDTH] IN BLOOD BY AUTOMATED COUNT: 13.7 % (ref 11.5–14.5)
EST. GFR  (NO RACE VARIABLE): >60 ML/MIN/1.73 M^2
GLUCOSE SERPL-MCNC: 85 MG/DL (ref 70–110)
HCT VFR BLD AUTO: 42.2 % (ref 37–48.5)
HGB BLD-MCNC: 13.9 G/DL (ref 12–16)
IMM GRANULOCYTES # BLD AUTO: NORMAL K/UL (ref 0–0.04)
IMM GRANULOCYTES NFR BLD AUTO: NORMAL % (ref 0–0.5)
LYMPHOCYTES NFR BLD: 26 % (ref 18–48)
MAGNESIUM SERPL-MCNC: 1.6 MG/DL (ref 1.6–2.6)
MCH RBC QN AUTO: 27.9 PG (ref 27–31)
MCHC RBC AUTO-ENTMCNC: 32.9 G/DL (ref 32–36)
MCV RBC AUTO: 85 FL (ref 82–98)
MONOCYTES NFR BLD: 4 % (ref 4–15)
NEUTROPHILS NFR BLD: 41 % (ref 38–73)
NEUTS BAND NFR BLD MANUAL: 26 %
NRBC BLD-RTO: 0 /100 WBC
OB PNL STL: NEGATIVE
PHOSPHATE SERPL-MCNC: 3.5 MG/DL (ref 2.7–4.5)
PLATELET # BLD AUTO: 237 K/UL (ref 150–450)
PLATELET BLD QL SMEAR: NORMAL
PMV BLD AUTO: 9.6 FL (ref 9.2–12.9)
POCT GLUCOSE: 83 MG/DL (ref 70–110)
POCT GLUCOSE: 85 MG/DL (ref 70–110)
POTASSIUM SERPL-SCNC: 4 MMOL/L (ref 3.5–5.1)
PROT SERPL-MCNC: 6.3 G/DL (ref 6–8.4)
PROT UR-MCNC: 194 MG/DL (ref 0–15)
PROT/CREAT UR: 1.67 MG/G{CREAT} (ref 0–0.2)
RBC # BLD AUTO: 4.99 M/UL (ref 4–5.4)
SODIUM SERPL-SCNC: 136 MMOL/L (ref 136–145)
WBC # BLD AUTO: 7.66 K/UL (ref 3.9–12.7)
WBC #/AREA STL HPF: NORMAL /[HPF]

## 2023-04-03 PROCEDURE — 96366 THER/PROPH/DIAG IV INF ADDON: CPT

## 2023-04-03 PROCEDURE — 85007 BL SMEAR W/DIFF WBC COUNT: CPT | Mod: NCS | Performed by: SURGERY

## 2023-04-03 PROCEDURE — 87177 OVA AND PARASITES SMEARS: CPT | Performed by: STUDENT IN AN ORGANIZED HEALTH CARE EDUCATION/TRAINING PROGRAM

## 2023-04-03 PROCEDURE — 25000003 PHARM REV CODE 250: Performed by: STUDENT IN AN ORGANIZED HEALTH CARE EDUCATION/TRAINING PROGRAM

## 2023-04-03 PROCEDURE — 84156 ASSAY OF PROTEIN URINE: CPT | Performed by: STUDENT IN AN ORGANIZED HEALTH CARE EDUCATION/TRAINING PROGRAM

## 2023-04-03 PROCEDURE — 25000003 PHARM REV CODE 250: Performed by: SURGERY

## 2023-04-03 PROCEDURE — 99900035 HC TECH TIME PER 15 MIN (STAT)

## 2023-04-03 PROCEDURE — 84100 ASSAY OF PHOSPHORUS: CPT | Performed by: SURGERY

## 2023-04-03 PROCEDURE — 80053 COMPREHEN METABOLIC PANEL: CPT | Performed by: SURGERY

## 2023-04-03 PROCEDURE — 87209 SMEAR COMPLEX STAIN: CPT | Performed by: STUDENT IN AN ORGANIZED HEALTH CARE EDUCATION/TRAINING PROGRAM

## 2023-04-03 PROCEDURE — 25000003 PHARM REV CODE 250: Performed by: EMERGENCY MEDICINE

## 2023-04-03 PROCEDURE — 82272 OCCULT BLD FECES 1-3 TESTS: CPT | Performed by: STUDENT IN AN ORGANIZED HEALTH CARE EDUCATION/TRAINING PROGRAM

## 2023-04-03 PROCEDURE — G0378 HOSPITAL OBSERVATION PER HR: HCPCS

## 2023-04-03 PROCEDURE — 96365 THER/PROPH/DIAG IV INF INIT: CPT

## 2023-04-03 PROCEDURE — 87046 STOOL CULTR AEROBIC BACT EA: CPT | Performed by: STUDENT IN AN ORGANIZED HEALTH CARE EDUCATION/TRAINING PROGRAM

## 2023-04-03 PROCEDURE — 85027 COMPLETE CBC AUTOMATED: CPT | Performed by: SURGERY

## 2023-04-03 PROCEDURE — 82962 GLUCOSE BLOOD TEST: CPT

## 2023-04-03 PROCEDURE — 87449 NOS EACH ORGANISM AG IA: CPT | Performed by: STUDENT IN AN ORGANIZED HEALTH CARE EDUCATION/TRAINING PROGRAM

## 2023-04-03 PROCEDURE — 82705 FATS/LIPIDS FECES QUAL: CPT | Performed by: STUDENT IN AN ORGANIZED HEALTH CARE EDUCATION/TRAINING PROGRAM

## 2023-04-03 PROCEDURE — 63600175 PHARM REV CODE 636 W HCPCS: Performed by: STUDENT IN AN ORGANIZED HEALTH CARE EDUCATION/TRAINING PROGRAM

## 2023-04-03 PROCEDURE — 94761 N-INVAS EAR/PLS OXIMETRY MLT: CPT

## 2023-04-03 PROCEDURE — 89055 LEUKOCYTE ASSESSMENT FECAL: CPT | Performed by: STUDENT IN AN ORGANIZED HEALTH CARE EDUCATION/TRAINING PROGRAM

## 2023-04-03 PROCEDURE — 87427 SHIGA-LIKE TOXIN AG IA: CPT | Mod: 59 | Performed by: STUDENT IN AN ORGANIZED HEALTH CARE EDUCATION/TRAINING PROGRAM

## 2023-04-03 PROCEDURE — 96361 HYDRATE IV INFUSION ADD-ON: CPT

## 2023-04-03 PROCEDURE — 83735 ASSAY OF MAGNESIUM: CPT | Performed by: SURGERY

## 2023-04-03 PROCEDURE — 87045 FECES CULTURE AEROBIC BACT: CPT | Performed by: STUDENT IN AN ORGANIZED HEALTH CARE EDUCATION/TRAINING PROGRAM

## 2023-04-03 PROCEDURE — 96367 TX/PROPH/DG ADDL SEQ IV INF: CPT

## 2023-04-03 PROCEDURE — 87425 ROTAVIRUS AG IA: CPT | Performed by: STUDENT IN AN ORGANIZED HEALTH CARE EDUCATION/TRAINING PROGRAM

## 2023-04-03 RX ORDER — ONDANSETRON 2 MG/ML
4 INJECTION INTRAMUSCULAR; INTRAVENOUS ONCE AS NEEDED
Status: DISCONTINUED | OUTPATIENT
Start: 2023-04-03 | End: 2023-04-04 | Stop reason: HOSPADM

## 2023-04-03 RX ORDER — SODIUM CHLORIDE, SODIUM LACTATE, POTASSIUM CHLORIDE, CALCIUM CHLORIDE 600; 310; 30; 20 MG/100ML; MG/100ML; MG/100ML; MG/100ML
INJECTION, SOLUTION INTRAVENOUS CONTINUOUS
Status: ACTIVE | OUTPATIENT
Start: 2023-04-03 | End: 2023-04-03

## 2023-04-03 RX ORDER — DIPHENOXYLATE HYDROCHLORIDE AND ATROPINE SULFATE 2.5; .025 MG/1; MG/1
1 TABLET ORAL 4 TIMES DAILY PRN
Status: DISCONTINUED | OUTPATIENT
Start: 2023-04-03 | End: 2023-04-04 | Stop reason: HOSPADM

## 2023-04-03 RX ORDER — MAGNESIUM SULFATE HEPTAHYDRATE 40 MG/ML
2 INJECTION, SOLUTION INTRAVENOUS
Status: COMPLETED | OUTPATIENT
Start: 2023-04-03 | End: 2023-04-03

## 2023-04-03 RX ADMIN — MAGNESIUM SULFATE IN WATER 2 G: 40 INJECTION, SOLUTION INTRAVENOUS at 09:04

## 2023-04-03 RX ADMIN — POTASSIUM CHLORIDE 10 MEQ: 7.46 INJECTION, SOLUTION INTRAVENOUS at 02:04

## 2023-04-03 RX ADMIN — DIPHENOXYLATE HYDROCHLORIDE AND ATROPINE SULFATE 1 TABLET: 2.5; .025 TABLET ORAL at 10:04

## 2023-04-03 RX ADMIN — DIPHENOXYLATE HYDROCHLORIDE AND ATROPINE SULFATE 1 TABLET: 2.5; .025 TABLET ORAL at 06:04

## 2023-04-03 RX ADMIN — Medication 6 MG: at 10:04

## 2023-04-03 RX ADMIN — PRAVASTATIN SODIUM 40 MG: 40 TABLET ORAL at 10:04

## 2023-04-03 RX ADMIN — MAGNESIUM SULFATE IN WATER 2 G: 40 INJECTION, SOLUTION INTRAVENOUS at 11:04

## 2023-04-03 RX ADMIN — LOSARTAN POTASSIUM 50 MG: 50 TABLET, FILM COATED ORAL at 09:04

## 2023-04-03 RX ADMIN — POTASSIUM CHLORIDE 10 MEQ: 7.46 INJECTION, SOLUTION INTRAVENOUS at 12:04

## 2023-04-03 RX ADMIN — SODIUM CHLORIDE, SODIUM LACTATE, POTASSIUM CHLORIDE, AND CALCIUM CHLORIDE: .6; .31; .03; .02 INJECTION, SOLUTION INTRAVENOUS at 11:04

## 2023-04-03 NOTE — PLAN OF CARE
The sw met with the pt in the ER to complete the assessment. The sw utilized the Kayla(ID# [id number])Dipesh to translate for the pt. The pt lives in [place] along with Azucena Nickerson(dtr-in-law)[phone]/Andry Nickerson([phone] and her 3 grand-kids. The pt's independent with her adl's and doesn't use dme. The pt still drives but states Andry will transport her home at d/c. The sw gave the pt a d/c brochure with her name and contact info on it and encouraged her to call if she has any further questions or concerns. The sw will continue to follow the pt throughout her transitions of care and will assist with any d/c needs.        04/03/23 1525   Discharge Assessment   Assessment Type Discharge Planning Assessment   Confirmed/corrected address, phone number and insurance Yes   Confirmed Demographics Correct on Facesheet   Source of Information patient   When was your last doctors appointment?   (last year)   Communicated LELE with patient/caregiver Date not available/Unable to determine   Reason For Admission SBO   People in Home child(felicitas), adult;grandchild(felicitas);other relative(s)   Do you expect to return to your current living situation? Yes   Do you have help at home or someone to help you manage your care at home? Yes   Who are your caregiver(s) and their phone number(s)? Azucena Nickerson(dtr-in-law)[phone]/Andry Nickerson(son)[phone]   Prior to hospitilization cognitive status: Alert/Oriented   Current cognitive status: Alert/Oriented   Home Accessibility stairs to enter home;not wheelchair accessible   Number of Stairs, Main Entrance six   Home Layout Able to live on 1st floor   Equipment Currently Used at Home none   Readmission within 30 days? No   Patient currently being followed by outpatient case management? No   Do you currently have service(s) that help you manage your care at home? No   Do you take prescription medications? Yes   Do you have prescription coverage? Yes   Coverage [employer] Medicare[place]icaid of  [place]   Do you have any problems affording any of your prescribed medications? No  (the pt receives her meds affordably at [facility] in [place])   Is the patient taking medications as prescribed? yes   Who is going to help you get home at discharge? Andry Nickerson(son)[phone]   How do you get to doctors appointments? car, drives self   Are you on dialysis? No   Do you take coumadin? No   Discharge Plan A Home with family   Discharge Plan B Home Health   DME Needed Upon Discharge  other (see comments)  (TBD)   Discharge Plan discussed with: Patient   Discharge Barriers Identified None   OTHER   Name(s) of People in Home Azucena Nickerson(dtr-in-law)[phone]/Andry Nickerson(son)[phone]

## 2023-04-03 NOTE — H&P
History:           Chief Complaint   Patient presents with    Abdominal Pain       Urination +blood, +vomiting, just came back from \[facility]\"" today and was seen in \[facility]\"" and dx with hernia. Pain is located across lower abdomen      HPI       Past Medical History:   Diagnosis Date    Chronic kidney disease, stage 3      Diabetes mellitus      Diabetes mellitus, type 2      Hematuria, unspecified      Hypercalcemia      Hypertension      Malnutrition of moderate degree 2020    Mixed hyperlipidemia 2022    Proteinuria      Renal cyst, left      UTI (urinary tract infection) 2020            Past Surgical History:   Procedure Laterality Date    ABDOMINAL SURGERY        CATHETERIZATION OF URETER Bilateral 2020     Procedure: CATHETERIZATION, URETER;  Surgeon: Kvng Cunningham MD;  Location: [address];  Service: Urology;  Laterality: Bilateral;     SECTION, CLASSIC         x2    CHOLECYSTECTOMY       CLOSURE, ILEOSTOMY, OPEN N/A 2021     Procedure: CLOSURE, ILEOSTOMY, OPEN, ERAS low;  Surgeon: Fabiana Valiente MD;  Location: [address];  Service: Colon and Rectal;  Laterality: N/A;    COLON SURGERY        \[facility]\""    COLONOSCOPY N/A 2018     Procedure: COLONOSCOPY;  Surgeon: Gibran Aguayo MD;  Location: [facility];  Service: Endoscopy;  Laterality: N/A;    COLOSTOMY Left     CYSTOSCOPY N/A 2020     Procedure: CYSTOSCOPY;  Surgeon: Kvng Cunningham MD;  Location: [address];  Service: Urology;  Laterality: N/A;    CYSTOSCOPY WITH URETEROSCOPY, RETROGRADE PYELOGRAPHY, AND INSERTION OF STENT Left 2019     Procedure: CYSTOSCOPY, WITH RETROGRADE PYELOGRAM AND URETERAL STENT INSERTION with placement of a muir cath;  Surgeon: Ulisses Horton MD;  Location: [facility];  Service: General;  Laterality: Left;    FLEXIBLE SIGMOIDOSCOPY N/A 2021     Procedure: SIGMOIDOSCOPY, FLEXIBLE;  Surgeon: Fabiana Valiente MD;  Location: [address]  [facility]R;  Service: Colon and Rectal;  Laterality: N/A;    HYSTEROSCOPY WITH DILATION AND CURETTAGE OF UTERUS N/A 12/9/2020     Procedure: HYSTEROSCOPY, WITH DILATION AND CURETTAGE OF UTERUS (MYOSURE);  Surgeon: Caitlyn Feng MD;  Location: [facility] OR;  Service: OB/GYN;  Laterality: N/A;  Guido notified 11/30, EF  video    HYSTEROSCOPY WITH DILATION AND CURETTAGE OF UTERUS N/A 3/4/2022     Procedure: HYSTEROSCOPY, WITH DILATION AND CURETTAGE OF UTERUS;  Surgeon: Caitlyn Feng MD;  Location: [facility] OR;  Service: OB/GYN;  Laterality: N/A;  Alexander notified CW 3/3  video confirmed    ILEOSTOMY   7/31/2020     Procedure: CREATION, ILEOSTOMY;  Surgeon: Fabiana Valiente MD;  Location: [facility] OR 2ND FLR;  Service: Colon and Rectal;;    INCISION AND DRAINAGE OF ABSCESS N/A 12/22/2019     Procedure: INCISION AND DRAINAGE, ABSCESS;  Surgeon: Ulisses Horton MD;  Location: [facility] OR;  Service: General;  Laterality: N/A;    INCISION OF ABDOMINAL WALL N/A 8/10/2018     Procedure: INCISION, ABDOMINAL WALL;  Surgeon: Ulisses Horton MD;  Location: [facility] OR;  Service: General;  Laterality: N/A;    INCISIONAL HERNIA REPAIR Right 2010    LOW ANTERIOR RESECTION OF COLON N/A 7/31/2020     Procedure: RESECTION, COLON, LOW ANTERIOR;  Surgeon: Fabiana Valiente MD;  Location: [facility] OR 2ND FLR;  Service: Colon and Rectal;  Laterality: N/A;    LYSIS OF ADHESIONS   7/31/2020     Procedure: LYSIS, ADHESIONS;  Surgeon: Fabiana Valiente MD;  Location: [facility] OR 2ND FLR;  Service: Colon and Rectal;;            Family History   Problem Relation Age of Onset    Stroke Mother      Heart disease Mother      Hyperlipidemia Mother      Hypertension Mother      Alcohol abuse Father      Stroke Sister      Diabetes Sister      Heart disease Sister      Hyperlipidemia Sister      Hypertension Sister      Diabetes Brother      Early death Grandchild      Anesthesia problems Neg Hx      Broken bones Neg Hx      Cancer Neg Hx        Social History             Socioeconomic History    Marital status: Single   Tobacco Use    Smoking status: Former       Types: Cigarettes       Quit date: 2000       Years since quittin.2    Smokeless tobacco: Never   Substance and Sexual Activity    Alcohol use: No    Drug use: No    Sexual activity: Not Currently            Review of patient's allergies indicates:   Allergen Reactions    Chlorhexidine gluconate Rash       Chlorhexidine/alcohol wipes. Rash and blistering.         Review of Systems     Physical Exam:             Initial Vitals [23 1443]   BP Pulse Resp Temp SpO2   (!) 192/103 102 20 98.2 °F (36.8 °C) 99 %       MAP           --              Physical Exam  alert x 3  Vs stable   Chest clear  Abdomen : soft , non tender , no mass palpable   Diffuse tenderness palpable ventral hernia left side of abdominal wall  Extremities : wnl        Procedures     Medical Decision Making:     History Acquisition:   Additional historians utilized:  Family member at bedside, see HPI     Prior medical records were reviewed:   Gen Surg visit 2023 for ventral hernia  OB-GYN visit 2023 for annual exam  Nephrology visit 2023 for f/u CKD     The patient's list of active medical problems, social history, medications, and allergies as documented has been reviewed.      Differential Diagnoses:   Based on available information and initial assessment, Differential Diagnosis includes, but is not limited to:  AAA, aortic dissection, mesenteric ischemia, perforated viscous, MI/ACS, SBO/volvulus, incarcerated/strangulated hernia, intussusception, ileus, appendicitis, cholecystitis, cholangitis, diverticulitis, esophagitis, hepatitis, nephrolithiasis, pancreatitis, gastroenteritis, colitis, IBD/IBS, biliary colic, GERD, PUD, constipation, UTI/pyelonephritis,  disorder.         EKG:   EKG interpretation by ED attending physician:  NSR, rate 97, occiasional PACs, no ST changes, no ischemia, normal intervals.  Compared with prior EKG  dated 08/2022, grossly stable without significant change.        Labs:            Labs Reviewed   CBC WITHOUT DIFFERENTIAL - Abnormal; Notable for the following components:       Result Value      RBC 5.42 (*)       MPV 8.7 (*)       All other components within normal limits   COMPREHENSIVE METABOLIC PANEL - Abnormal; Notable for the following components:     Glucose 132 (*)       Total Protein 8.5 (*)       Anion Gap 17 (*)       eGFR 44 (*)       All other components within normal limits   URINALYSIS, REFLEX TO URINE CULTURE - Abnormal; Notable for the following components:     Specific Gravity, UA >1.030 (*)       Protein, UA 3+ (*)       Glucose, UA 4+ (*)       Ketones, UA 1+ (*)       All other components within normal limits     Narrative:      Specimen Source->Urine   URINALYSIS MICROSCOPIC - Abnormal; Notable for the following components:     WBC, UA 7 (*)       All other components within normal limits     Narrative:      Specimen Source->Urine   BASIC METABOLIC PANEL - Abnormal; Notable for the following components:     Potassium 3.4 (*)       CO2 17 (*)       Calcium 8.2 (*)       All other components within normal limits   PROTEIN / CREATININE RATIO, URINE - Abnormal; Notable for the following components:     Protein, Urine Random 194 (*)       Prot/Creat Ratio, Urine 1.67 (*)       All other components within normal limits     Narrative:      Specimen Source->Urine   COMPREHENSIVE METABOLIC PANEL - Abnormal; Notable for the following components:     CO2 16 (*)       Calcium 8.2 (*)       Albumin 2.5 (*)       All other components within normal limits   CULTURE, STOOL   LIPASE   LACTIC ACID, PLASMA   MAGNESIUM   PHOSPHORUS   CBC W/ AUTO DIFFERENTIAL   MAGNESIUM   PHOSPHORUS   FECAL FAT, QUALITATIVE   OCCULT BLOOD X 1, STOOL   WBC, STOOL   ROTAVIRUS ANTIGEN, STOOL   STOOL EXAM-OVA,CYSTS,PARASITES   POCT GLUCOSE   POCT GLUCOSE MONITORING CONTINUOUS      Independent review of the labs ordered include:    See ED course     Imaging:      Imaging Results                   CT Abdomen Pelvis With Contrast (Final result)  Result time 04/02/23 18:13:16            Final result by Kenan Leach MD (04/02/23 18:13:16)                           Impression:        1.  Postoperative changes in the distal small bowel with multiple air-fluid levels and wall thickening of the small bowel loops with no abrupt transition point identified.  Overall findings suggestive of at least partial small bowel obstruction.  Continued short-term follow-up suggested.     2.  Copious amount of fluid within the large and small bowel with abnormal wall thickening involving both the large and small bowel loops.  Some component of enterocolitis suspected.     3.  No evidence of a drainable collection in the abdomen or pelvis.     3.  Additional findings as above.     This report was flagged in Epic as abnormal.        Electronically signed by:     Kenan Leach MD  Date:                                            04/02/2023  Time:                                            18:13                     Narrative:     EXAMINATION:  CT ABDOMEN PELVIS WITH CONTRAST     CLINICAL HISTORY:  Abdominal abscess/infection suspected;Bowel obstruction suspected;     TECHNIQUE:  Low dose axial images, sagittal and coronal reformations were obtained from the lung bases to the pubic symphysis following the IV administration of 75 mL of Omnipaque 350 .  Oral contrast was not given.     COMPARISON:  CT scan of the abdomen pelvis dated 03/10/2021.     CT abdomen pelvis dated 10/12/2020     CT abdomen pelvis dated 09/16/2020.     FINDINGS:  There are no pleural effusions.  There is no evidence of a pneumothorax.  No airspace opacity is present.  There are bibasilar ground-glass airspace opacities.     The heart is enlarged.  There are coronary artery calcifications.  There is normal tapering of the abdominal aorta.  There are extensive calcifications along the course of the  abdominal aorta is branch vessels.  The celiac trunk, the SMA, and ARPIT are within normal limits.  The portal veins and mesenteric veins are within normal limits.  The IVC and the remainder of the venous structures are within normal limits.     There is no evidence of lymphadenopathy in the abdomen or pelvis.     The esophagus, stomach, and duodenum are within normal limits.  There are postop changes involving the distal small bowel loops.  There is distention and wall thickening along with multiple air-fluid levels within the small bowel loops.  No definitive transition point is identified.  There are postop changes in the in the rectosigmoid colon.  There is copious amount of stool within the small and large bowel loops with abnormal wall thickening.     The liver is unremarkable.  The patient is status post cholecystectomy.  There is unchanged dilatation of the extrahepatic biliary tree.  The spleen is unremarkable.  The pancreas is within normal limits.  The adrenal glands are unremarkable.     The right kidney is unremarkable.  There are two simple cysts in the left kidney.  The ureters and urinary bladder are unremarkable.  The uterus and adnexal structures are within normal limits.     There is no evidence of free fluid in the abdomen or pelvis.  There has been resolution of the previous collection in the right anterior pelvis.  There is no evidence of free air.  There is no pneumatosis.  No portal venous air is identified.     The psoas margins are unremarkable.  There is an umbilical hernia present.  No significant bowel component identified.  There are degenerative changes in the osseous structures.                                             Imp: abdominal pain , DM , Partial bowel obstruction, pvd    Plan; consult medicine , observation.

## 2023-04-03 NOTE — PROGRESS NOTES
Surgery follow up   /77 (BP Location: Right arm, Patient Position: Lying)   Pulse 83   Temp 98.3 °F (36.8 °C) (Oral)   Resp 18   LMP  (LMP Unknown)   SpO2 98%  I/O last 3 completed shifts:  In: 1755.5 [I.V.:569; IV Piggyback:1186.5]  Out: -   No intake/output data recorded.    Recent Results (from the past 336 hour(s))   CBC auto differential    Collection Time: 04/03/23  6:05 AM   Result Value Ref Range    WBC 7.66 3.90 - 12.70 K/uL    Hemoglobin 13.9 12.0 - 16.0 g/dL    Hematocrit 42.2 37.0 - 48.5 %    Platelets 237 150 - 450 K/uL     Abdomen soft , having diarrhea will start on diet.

## 2023-04-03 NOTE — CONSULTS
"[facility] Medicine Consult Note    Admitting Team: [facility] Hospitalist Team B  Primary Attending Physician: Ulisses Horton MD  Consult Attending Physician: Nishant Mcneal MD  Resident: MD Alex  Intern: MD Camille     Date of Admit: 4/2/2023    Chief Complaint     Right-sided abdominal pain, nausea, vomiting, and diarrhea for approximately three days prior to admission    Subjective:      History of Present Illness:  Azucena Morrison is a 72 y.o. female with a PMHx of CKD stage 3b, T2DM, HTN, and HLD. The patient presented on 4/2/2023 for evaluation of right-sided abdominal pain, nausea, vomiting, diarrhea, and one episode of gross hematuria onset approximately 7 days prior to admission. Patient's daughter-in-law, Juancho Nickerson, is at bedside and assists with provision of history.    The patient was in her usual state of health until approximately one week prior to admission. She was in [place], where she had been since 2/28/23, when she began to experience symptoms. She first began to experience abdominal pain followed by nausea and nonbloody, nonbilious vomiting so she sought care at a hospital there. She was told that she had a "hernia," given pain medication and antiemetics, and discharged. She had several additional ED visits while in [place] for similar complaints with no further interventions. She also noted one episode of gross hematuria the Monday prior which resolved on its own.    Additionally, the patient reports three days of yellow, non-bloody diarrhea. She has approximately five episodes of diarrhea per day.    She denies any fever, chills, headache, stiff neck, chest pain, cough, or dyspnea. She additionally denies dysuria or hematuria since her one episode of hematuria on 3/26/23. She does have an extensive abdominal surgical history per family.      Past Medical History:  - T2DM  - HTN  - HLD  - CKD Stage 3b    Past Surgical History:  Past Surgical History:   Procedure Laterality " Date    ABDOMINAL SURGERY      CATHETERIZATION OF URETER Bilateral 2020    Procedure: CATHETERIZATION, URETER;  Surgeon: Kvng Cunningham MD;  Location: [facility] OR 2ND FLR;  Service: Urology;  Laterality: Bilateral;     SECTION, CLASSIC      x2    CHOLECYSTECTOMY  1997    CLOSURE, ILEOSTOMY, OPEN N/A 2021    Procedure: CLOSURE, ILEOSTOMY, OPEN, ERAS low;  Surgeon: Fabiana Valiente MD;  [facility];  Service: Colon and Rectal;  Laterality: N/A;    COLON SURGERY      [facility]    COLONOSCOPY N/A 2018    Procedure: COLONOSCOPY;  Surgeon: Gibran Aguayo MD;  Location: [facility];  Service: Endoscopy;  Laterality: N/A;    COLOSTOMY Left     CYSTOSCOPY N/A 2020    Procedure: CYSTOSCOPY;  Surgeon: Kvng Cunningham MD;  [facility];  Service: Urology;  Laterality: N/A;    CYSTOSCOPY WITH URETEROSCOPY, RETROGRADE PYELOGRAPHY, AND INSERTION OF STENT Left 2019    Procedure: CYSTOSCOPY, WITH RETROGRADE PYELOGRAM AND URETERAL STENT INSERTION with placement of a muir cath;  Surgeon: Ulisses Horton MD;  Location: [facility];  Service: General;  Laterality: Left;    FLEXIBLE SIGMOIDOSCOPY N/A 2021    Procedure: SIGMOIDOSCOPY, FLEXIBLE;  Surgeon: Fabiana Valiente MD;  [facility];  Service: Colon and Rectal;  Laterality: N/A;    HYSTEROSCOPY WITH DILATION AND CURETTAGE OF UTERUS N/A 2020    Procedure: HYSTEROSCOPY, WITH DILATION AND CURETTAGE OF UTERUS (MYOSURE);  Surgeon: Caitlyn Feng MD;  Location: [facility];  Service: OB/GYN;  Laterality: N/A;  Guido notified ,   video    HYSTEROSCOPY WITH DILATION AND CURETTAGE OF UTERUS N/A 3/4/2022    Procedure: HYSTEROSCOPY, WITH DILATION AND CURETTAGE OF UTERUS;  Surgeon: Caitlyn Feng MD;  Location: [facility];  Service: OB/GYN;  Laterality: N/A;  Alexander notified CW 3/3  video confirmed    ILEOSTOMY  2020    Procedure: CREATION, ILEOSTOMY;  Surgeon: Fabiana Valiente MD;  Location:  [facility] OR 2ND FLR;  Service: Colon and Rectal;;    INCISION AND DRAINAGE OF ABSCESS N/A 2019    Procedure: INCISION AND DRAINAGE, ABSCESS;  Surgeon: Ulisses Horton MD;  Location: [facility] OR;  Service: General;  Laterality: N/A;    INCISION OF ABDOMINAL WALL N/A 8/10/2018    Procedure: INCISION, ABDOMINAL WALL;  Surgeon: Ulisses Horton MD;  Location: [facility] OR;  Service: General;  Laterality: N/A;    INCISIONAL HERNIA REPAIR Right 2010    LOW ANTERIOR RESECTION OF COLON N/A 2020    Procedure: RESECTION, COLON, LOW ANTERIOR;  Surgeon: Fabiana Valiente MD;  Location: [facility] OR 2ND FLR;  Service: Colon and Rectal;  Laterality: N/A;    LYSIS OF ADHESIONS  2020    Procedure: LYSIS, ADHESIONS;  Surgeon: Fabiana Valiente MD;  Location: [facility] OR 2ND FLR;  Service: Colon and Rectal;;     Allergies:  Review of patient's allergies indicates:   Allergen Reactions    Chlorhexidine gluconate Rash     Chlorhexidine/alcohol wipes. Rash and blistering.     Home Medications:  - Jardiance 25mg daily  - HCTZ 25mg daily  - metformin 1000mg BID  - Coreg CR 80mg daily  - Lantus 10U daily every morning  - olmesartan 20mg daily  - Prilosec 20mg daily  - pravastatin 20mg daily  - Trulicity 3mg every Monday    Family History:  Family History   Problem Relation Age of Onset    Stroke Mother     Heart disease Mother     Hyperlipidemia Mother     Hypertension Mother     Alcohol abuse Father     Stroke Sister     Diabetes Sister     Heart disease Sister     Hyperlipidemia Sister     Hypertension Sister     Diabetes Brother     Early death Grandchild     Anesthesia problems Neg Hx     Broken bones Neg Hx     Cancer Neg Hx      Social History:  Social History     Tobacco Use    Smoking status: Former     Types: Cigarettes     Quit date: 2000     Years since quittin.2    Smokeless tobacco: Never   Substance Use Topics    Alcohol use: No    Drug use: No     Review of Systems:  Review of Systems   Constitutional:   Negative for chills and fever.   HENT:  Negative for sinus pain and sore throat.    Eyes:  Negative for pain and discharge.   Respiratory:  Negative for cough, sputum production and shortness of breath.    Cardiovascular:  Negative for chest pain, palpitations and leg swelling.   Gastrointestinal:  Positive for abdominal pain, diarrhea, nausea and vomiting.   Genitourinary:  Positive for hematuria. Negative for dysuria and flank pain.   Musculoskeletal:  Negative for back pain and neck pain.   Skin:  Negative for itching and rash.   Neurological:  Negative for dizziness and headaches.   Psychiatric/Behavioral:  Negative for depression. The patient is not nervous/anxious.      Health Care Maintenance :   Primary Care Physician: Pj Sanches MD   Immunizations:   Immunization History   Administered Date(s) Administered    COVID-19, MRNA, LN-S, PF (MODERNA FULL 0.5 ML DOSE) 10/25/2021, 2022    COVID-19, vector-nr, rS-Ad26, PF ([place]) 2021    Hepatitis A, Adult 2005    Hepatitis B, Adult 2005    Influenza - High Dose - PF (65 years and older) 2020    Influenza A (H1N1) 2009 Monovalent - IM 2009    Pneumococcal Conjugate - 13 Valent 2016    Pneumococcal Polysaccharide - 23 Valent 2015    Td (ADULT) 2005      Cancer Screening:  MMG: UTD, BI-RADS 1 in 2023; next MMG due around 2024  Colonoscopy: last performed 2018, follow-up recommendations unclear per report    Objective:   Last 24 Hour Vital Signs:  BP  Min: 130/79  Max: 192/103  Temp  Av °F (36.7 °C)  Min: 97.7 °F (36.5 °C)  Max: 98.2 °F (36.8 °C)  Pulse  Av.6  Min: 87  Max: 102  Resp  Av.5  Min: 16  Max: 20  SpO2  Av %  Min: 90 %  Max: 99 %  There is no height or weight on file to calculate BMI.  No intake/output data recorded.    Physical Examination:  Physical Exam   Constitutional: She is oriented to person, place, and time. normal appearance. No distress.   HENT:   Head:  Normocephalic and atraumatic.   Right Ear: External ear normal.   Left Ear: External ear normal.   Nose: Nose normal.   Mouth/Throat: Mucous membranes are dry. No oropharyngeal exudate or posterior oropharyngeal erythema. Oropharynx is clear.   Eyes: Pupils are equal, round, and reactive to light. Conjunctivae are normal. Right eye exhibits no discharge. Left eye exhibits no discharge. No scleral icterus.   Cardiovascular: Normal rate, regular rhythm, normal heart sounds and normal pulses. Pulmonary:      Effort: Pulmonary effort is normal.      Breath sounds: Normal breath sounds.     Abdominal: Normal appearance. She exhibits distension. There is abdominal tenderness (TTP in RUQ, RLQ). There is no rebound and no guarding.   Multiple healed surgical scars noted to anterior abdominal wall   Musculoskeletal:         General: Normal range of motion.      Cervical back: Normal range of motion and neck supple.      Right lower leg: No edema.      Left lower leg: No edema.   Neurological: She is alert and oriented to person, place, and time.   Skin: Skin is warm and dry.   Psychiatric: Her behavior is normal. Mood, judgment and thought content normal.   Nursing note and vitals reviewed.     Laboratory:  Most Recent Data:  CBC:   Lab Results   Component Value Date    WBC 5.03 04/02/2023    HGB 15.3 04/02/2023    HCT 45.1 04/02/2023     04/02/2023    MCV 83 04/02/2023    RDW 13.4 04/02/2023     BMP:   Lab Results   Component Value Date     04/02/2023    K 4.0 04/02/2023     04/02/2023    CO2 23 04/02/2023    BUN 20 04/02/2023    CREATININE 1.3 04/02/2023     (H) 04/02/2023    CALCIUM 9.7 04/02/2023    MG 1.4 (L) 01/12/2023    PHOS 2.8 01/12/2023     LFTs:   Lab Results   Component Value Date    PROT 8.5 (H) 04/02/2023    ALBUMIN 3.6 04/02/2023    BILITOT 0.6 04/02/2023    AST 20 04/02/2023    ALKPHOS 110 04/02/2023    ALT 19 04/02/2023     Coags:   Lab Results   Component Value Date    INR 1.0  07/14/2020     FLP:   Lab Results   Component Value Date    CHOL 183 02/08/2022    HDL 30 (L) 02/08/2022    LDLCALC Invalid, Trig>400.0 02/08/2022    TRIG 430 (H) 02/08/2022    CHOLHDL 16.4 (L) 02/08/2022     DM:   Lab Results   Component Value Date    HGBA1C 5.8 (H) 01/12/2023    HGBA1C 9.3 (H) 09/29/2022    HGBA1C 6.7 (H) 07/09/2020    LDLCALC Invalid, Trig>400.0 02/08/2022    CREATININE 1.3 04/02/2023     Thyroid:   Lab Results   Component Value Date    TSH 1.70 06/11/2008     Anemia:   Lab Results   Component Value Date    IRON 55 01/12/2023    TIBC 444 01/12/2023    FERRITIN 43 01/12/2023    QCVDKEPL57 250 11/25/2020    FOLATE 10.1 08/03/2018     Cardiac:   Lab Results   Component Value Date    TROPONINI 0.016 08/05/2022    BNP 52 08/05/2022     Urinalysis:   Lab Results   Component Value Date    LABURIN ESCHERICHIA COLI ESBL  >100,000 cfu/ml   (A) 08/05/2020    COLORU Yellow 04/02/2023    SPECGRAV >1.030 (A) 04/02/2023    NITRITE Negative 04/02/2023    KETONESU 1+ (A) 04/02/2023    UROBILINOGEN Negative 04/02/2023    WBCUA 7 (H) 04/02/2023     Trended Lab Data:  Recent Labs   Lab 04/02/23  1540   WBC 5.03   HGB 15.3   HCT 45.1      MCV 83   RDW 13.4      K 4.0      CO2 23   BUN 20   CREATININE 1.3   *   PROT 8.5*   ALBUMIN 3.6   BILITOT 0.6   AST 20   ALKPHOS 110   ALT 19     Microbiology Data:  Microbiology Results (last 7 days)       ** No results found for the last 168 hours. **           Other Results:  EKG (my interpretation): sinus rhythm LAD     Radiology:  CT A/P with contrast:  1.  Postoperative changes in the distal small bowel with multiple air-fluid levels and wall thickening of the small bowel loops with no abrupt transition point identified.  Overall findings suggestive of at least partial small bowel obstruction.  Continued short-term follow-up suggested.  2.  Copious amount of fluid within the large and small bowel with abnormal wall thickening involving both the large  and small bowel loops.  Some component of enterocolitis suspected.   3.  No evidence of a drainable collection in the abdomen or pelvis.     Assessment:     Azucena Morrison is a 72 y.o. female with a PMHx of CKD stage 3b, T2DM, HTN, and HLD presenting with abdominal pain, nausea, vomiting, and diarrhea for several days' duration. Exam significant for dry mucous membranes, TTP in RUQ and RLQ of abdomen. Workup significant for findings consistent with SBO on CT A/P with contrast. Patient admitted to General Surgery for SBO. Medicine consulted for co-management of comorbidities.      Plan:     Small Bowel Obstruction  - patient with several days of abdominal pain accompanied by nausea, vomiting, and diarrhea  - of note, has extensive abdominal surgical history; her procedures have been at [facility] and [facility]  - on exam, RUQ and RLQ of abdomen TTP without rebound or guarding  - lactate WNL  - CT A/P with wall thickening of small bowel loops, multiple air-fluid levels suggestive of partial SBO  - patient admitted to General Surgery; management of SBO per Surgery team    Diarrhea  Dehydration  - patient with three days of diarrhea that is yellow and without obvious blood  - patient reports marked thirst and requests water multiple times on initial interview  - on exam, dry mucous membranes noted  - urine specific gravity >1.030  - giving additional bolus of 1L LR; will start gentle maintenance fluids thereafter, readjust on a daily basis  - will consider stool studies tomorrow if patient remains symptomatic    Anion Gap Metabolic Acidosis  - bicarb 23, anion gap 17 on admission  - lactate WNL  - of note, patient is on SGLT-2i so euglycemic DKA a consideration; however, patient's glucose is 132 and she has other pathology that would explain her abdominal pain, nausea, and vomiting  - repeating BMP  - will work up AGMA further if persists after initial fluid resuscitation    Essential HTN  - home regimen includes HCTZ  25mg daily, Coreg CR 80mg daily, olmesartan 20mg daily  - holding HCTZ in the setting of volume depletion  - starting losartan 50mg daily as olmesartan is non-formulary  - can restart Coreg if persistently hypertensive    Type II DM, with long term use of insulin, complicated by diabetic renal disease  - A1c at 5.8% in 1/2023; improved from 9.3% in 9/2022  - home regimen includes metformin 1000mg BID, Lantus 10U QAM, Jardiance 25mg daily, and Trulicity 3mg every Monday  - on low-intensity statin with pravastatin 20mg daily  - as patient is NPO, will start low-dose SSI with ACHS Accu-Checks for now; will resume home basal insulin when appropriate  - holding other PO antidiabetics at this time    CKD Stage IIIb  - likely multifactorial due to HTN and DM renal disease  - complicated by persistent proteinuria  - follows outpatient with Dr. Singh  - on multiple antidiabetic agents  - also on Kerendia (MRA) per nephrologist  - BUN/Cr stable on admission  - is noted to have 3+ protein on UA; she also has 4+ glucose but is on an SGLT-2i  - obtaining P/C ratio  - will continue to monitor    Mixed HLD  - on low-intensity statin with pravastatin 20mg daily  - continuing statin    Protein Gap  - total protein 8.5, albumin 3.6 with gap of 4.9  - of note, does have kappa/lambda FLC ratio at upper limit of normal; this is being worked up by his outpatient nephrologist    Health Care Maintenance  - HCV screen UTD  - MMG UTD  - of note, is non-immune to HBV per serology in 1/2023  - Vaccinations needed: HBV on outpatient basis    Ppx: Lovenox  Diet: NPO; diet per General Surgery  Code: Full Code    Gustavo Johnson M.D.  [facility] Internal Medicine PGY-2    [facility] Medicine Hospitalist Pager numbers:   [facility] Hospitalist Medicine Team A (Omero/Cuauhtemoc): [phone]  [facility] Hospitalist Medicine Team B (Elizabet/Peng):  [phone]

## 2023-04-03 NOTE — PROGRESS NOTES
[facility] Medicine Progress Note    Primary Team: [facility] Hospitalist Team B  Attending Physician: Ulisses Horton MD  Resident: MD Alex  Intern: MD Camille    Hospital Day: 0 days    Chief Complaint: Right-sided abdominal pain, nausea, vomiting, and diarrhea for approximately three days prior to admission    Subjective:      Patient seen and examined by me this morning. She reports improvement in her pain. However, she still endorses diarrhea and had several episodes overnight. She denies any additional episodes of nausea or vomiting.     Objective:   Last 24 Hour Vital Signs:  BP  Min: 119/74  Max: 192/103  Temp  Av °F (36.7 °C)  Min: 97.7 °F (36.5 °C)  Max: 98.3 °F (36.8 °C)  Pulse  Av.3  Min: 75  Max: 111  Resp  Av.3  Min: 10  Max: 20  SpO2  Av.6 %  Min: 90 %  Max: 99 %    Intake/Output Summary (Last 24 hours) at 4/3/2023 1041  Last data filed at 4/3/2023 0600  Gross per 24 hour   Intake 1755.51 ml   Output --   Net 1755.51 ml      Physical Examination:  Physical Exam   Constitutional: She is oriented to person, place, and time. normal appearance. No distress.   HENT:   Head: Normocephalic and atraumatic.   Right Ear: External ear normal.   Left Ear: External ear normal.   Nose: Nose normal.   Mouth/Throat: Mucous membranes are dry. No oropharyngeal exudate or posterior oropharyngeal erythema. Oropharynx is clear.   Eyes: Pupils are equal, round, and reactive to light. Conjunctivae are normal. Right eye exhibits no discharge. Left eye exhibits no discharge. No scleral icterus.   Cardiovascular: Normal rate, regular rhythm, normal heart sounds and normal pulses. Pulmonary:      Effort: Pulmonary effort is normal.      Breath sounds: Normal breath sounds.      Abdominal: Normal appearance. She exhibits distension. There is abdominal tenderness (TTP in RUQ, RLQ). There is no rebound and no guarding.   Multiple healed surgical scars noted to anterior abdominal wall   Musculoskeletal:          General: Normal range of motion.      Cervical back: Normal range of motion and neck supple.      Right lower leg: No edema.      Left lower leg: No edema.   Neurological: She is alert and oriented to person, place, and time.   Skin: Skin is warm and dry.   Psychiatric: Her behavior is normal. Mood, judgment and thought content normal.   Nursing note and vitals reviewed.     Laboratory:  Recent Labs   Lab 04/02/23  1540 04/02/23  2321 04/03/23  0605   WBC 5.03  --  7.66   HGB 15.3  --  13.9   HCT 45.1  --  42.2     --  237   MCV 83  --  85   RDW 13.4  --  13.7    138 136   K 4.0 3.4* 4.0    105 107   CO2 23 17* 16*   BUN 20 19 21   CREATININE 1.3 0.9 1.0   * 99 85   PROT 8.5*  --  6.3   ALBUMIN 3.6  --  2.5*   BILITOT 0.6  --  0.4   AST 20  --  16   ALKPHOS 110  --  74   ALT 19  --  12     Microbiology Results (last 7 days)       Procedure Component Value Units Date/Time    Stool culture [[id number]]     Order Status: No result Specimen: Stool           I have personally reviewed the above laboratory studies.     Imaging:  EKG (my interpretation): no new EKG    - no new imaging     Current Medications:     Scheduled:   losartan  50 mg Oral Daily    magnesium sulfate IVPB  2 g Intravenous Q2H    pravastatin  40 mg Oral QHS         Infusions:   lactated ringers 100 mL/hr at 04/03/23 1115        PRN:  dextrose 10%, dextrose 10%, glucagon (human recombinant), insulin aspart U-100, melatonin, sodium chloride 0.9%  Antibiotics and Day Number of Therapy:  Antibiotics (From admission, onward)      None            Assessment:     Azucena Morrison is a 72 y.o. female with a PMHx of CKD stage 3b, T2DM, HTN, and HLD presenting with abdominal pain, nausea, vomiting, and diarrhea for several days' duration. Exam significant for dry mucous membranes, TTP in RUQ and RLQ of abdomen. Workup significant for findings consistent with SBO on CT A/P with contrast. Patient admitted to General Surgery  for SBO. Medicine consulted for co-management of comorbidities.     Plan:     Small Bowel Obstruction  - patient with several days of abdominal pain accompanied by nausea, vomiting, and diarrhea  - of note, has extensive abdominal surgical history; her procedures have been at [facility] and [facility]  - on exam, RUQ and RLQ of abdomen TTP without rebound or guarding  - lactate WNL  - CT A/P with wall thickening of small bowel loops, multiple air-fluid levels suggestive of partial SBO  - patient admitted to General Surgery, SBO management per general surgery     Diarrhea  Dehydration  - patient with three days of diarrhea that is yellow and without obvious blood  - patient reports marked thirst and requests water multiple times on initial interview  - on exam, dry mucous membranes noted  - urine specific gravity >1.030  - continuing gentle IV fluids  - obtaining stool studies given recent travel     Anion Gap Metabolic Acidosis, resolved  Non-Gap Metabolic Acidosis 2/2 acute diarrhea  - bicarb 23, anion gap 17 on admission  - lactate WNL  - of note, patient is on SGLT-2i so euglycemic DKA a consideration; however, patient's glucose is 132 and she has other pathology that would explain her abdominal pain, nausea, and vomiting  - AGMA resolved after IVF resuscitation; patient continues to have non-gap acidosis, which is expected with her diarrhea  - will provide fluid resuscitation while patient is NPO    Essential HTN  - home regimen includes HCTZ 25mg daily, Coreg CR 80mg daily, olmesartan 20mg daily  - holding HCTZ in the setting of volume depletion  - continuing losartan 50mg daily as olmesartan is non-formulary  - can restart Coreg if persistently hypertensive     Type II DM, with long term use of insulin, complicated by diabetic renal disease  - A1c at 5.8% in 1/2023; improved from 9.3% in 9/2022  - home regimen includes metformin 1000mg BID, Lantus 10U QAM, Jardiance 25mg daily, and Trulicity 3mg every Monday  - on  low-intensity statin with pravastatin 20mg daily  - as patient is NPO, continuing low-dose SSI with ACHS Accu-Checks for now; will resume home basal insulin when appropriate  - holding other PO antidiabetics at this time     CKD Stage IIIb  - likely multifactorial due to HTN and DM renal disease  - complicated by persistent proteinuria  - follows outpatient with Dr. Singh  - on multiple antidiabetic agents  - also on Kerendia (MRA) per nephrologist  - BUN/Cr stable on admission  - is noted to have 3+ protein on UA; she also has 4+ glucose but is on an SGLT-2i  - P/C ratio elevated; will optimize renal-protective medication regimen, consider further workup as necessary  - will continue to monitor     Mixed HLD  - on low-intensity statin with pravastatin 20mg daily  - continuing statin     Protein Gap  - total protein 8.5, albumin 3.6 with gap of 4.9  - of note, does have kappa/lambda FLC ratio at upper limit of normal; this is being worked up by his outpatient nephrologist     Health Care Maintenance  - HCV screen UTD  - MMG UTD  - of note, is non-immune to HBV per serology in 1/2023  - Vaccinations needed: HBV on outpatient basis     Ppx: Lovenox  Diet: NPO; diet per General Surgery  Code: Full Code    Gustavo Johnson M.D.  [facility] Internal Medicine PGY-2    [facility] Medicine Hospitalist Pager numbers:   [facility] Hospitalist Medicine Team A (Omero/Cuauhtemoc): [phone]  [facility] Hospitalist Medicine Team B (Elizabet/Peng):  [phone]

## 2023-04-04 VITALS
TEMPERATURE: 98 F | RESPIRATION RATE: 20 BRPM | HEART RATE: 92 BPM | DIASTOLIC BLOOD PRESSURE: 86 MMHG | SYSTOLIC BLOOD PRESSURE: 135 MMHG | OXYGEN SATURATION: 99 % | WEIGHT: 91.69 LBS | BODY MASS INDEX: 16.77 KG/M2

## 2023-04-04 LAB
ALBUMIN SERPL BCP-MCNC: 2.5 G/DL (ref 3.5–5.2)
ALP SERPL-CCNC: 70 U/L (ref 55–135)
ALT SERPL W/O P-5'-P-CCNC: 12 U/L (ref 10–44)
ANION GAP SERPL CALC-SCNC: 10 MMOL/L (ref 8–16)
AST SERPL-CCNC: 15 U/L (ref 10–40)
BASOPHILS # BLD AUTO: 0.01 K/UL (ref 0–0.2)
BASOPHILS NFR BLD: 0.2 % (ref 0–1.9)
BILIRUB SERPL-MCNC: 0.2 MG/DL (ref 0.1–1)
BUN SERPL-MCNC: 14 MG/DL (ref 8–23)
CALCIUM SERPL-MCNC: 8.4 MG/DL (ref 8.7–10.5)
CHLORIDE SERPL-SCNC: 107 MMOL/L (ref 95–110)
CO2 SERPL-SCNC: 20 MMOL/L (ref 23–29)
CREAT SERPL-MCNC: 1.1 MG/DL (ref 0.5–1.4)
DIFFERENTIAL METHOD: ABNORMAL
E COLI SXT1 STL QL IA: NEGATIVE
E COLI SXT2 STL QL IA: NEGATIVE
EOSINOPHIL # BLD AUTO: 0.1 K/UL (ref 0–0.5)
EOSINOPHIL NFR BLD: 2.2 % (ref 0–8)
ERYTHROCYTE [DISTWIDTH] IN BLOOD BY AUTOMATED COUNT: 13.6 % (ref 11.5–14.5)
EST. GFR  (NO RACE VARIABLE): 54 ML/MIN/1.73 M^2
GLUCOSE SERPL-MCNC: 134 MG/DL (ref 70–110)
HCT VFR BLD AUTO: 37.5 % (ref 37–48.5)
HGB BLD-MCNC: 12.5 G/DL (ref 12–16)
IMM GRANULOCYTES # BLD AUTO: 0.03 K/UL (ref 0–0.04)
IMM GRANULOCYTES NFR BLD AUTO: 0.6 % (ref 0–0.5)
LYMPHOCYTES # BLD AUTO: 1.6 K/UL (ref 1–4.8)
LYMPHOCYTES NFR BLD: 29.9 % (ref 18–48)
MAGNESIUM SERPL-MCNC: 1.9 MG/DL (ref 1.6–2.6)
MCH RBC QN AUTO: 27.6 PG (ref 27–31)
MCHC RBC AUTO-ENTMCNC: 33.3 G/DL (ref 32–36)
MCV RBC AUTO: 83 FL (ref 82–98)
MONOCYTES # BLD AUTO: 0.7 K/UL (ref 0.3–1)
MONOCYTES NFR BLD: 13.3 % (ref 4–15)
NEUTROPHILS # BLD AUTO: 2.9 K/UL (ref 1.8–7.7)
NEUTROPHILS NFR BLD: 53.8 % (ref 38–73)
NRBC BLD-RTO: 0 /100 WBC
PHOSPHATE SERPL-MCNC: 1.8 MG/DL (ref 2.7–4.5)
PLATELET # BLD AUTO: 279 K/UL (ref 150–450)
PMV BLD AUTO: 8.7 FL (ref 9.2–12.9)
POCT GLUCOSE: 132 MG/DL (ref 70–110)
POCT GLUCOSE: 141 MG/DL (ref 70–110)
POCT GLUCOSE: 71 MG/DL (ref 70–110)
POTASSIUM SERPL-SCNC: 3.4 MMOL/L (ref 3.5–5.1)
PROT SERPL-MCNC: 6.1 G/DL (ref 6–8.4)
RBC # BLD AUTO: 4.53 M/UL (ref 4–5.4)
RV AG STL QL IA.RAPID: NEGATIVE
SODIUM SERPL-SCNC: 137 MMOL/L (ref 136–145)
WBC # BLD AUTO: 5.35 K/UL (ref 3.9–12.7)

## 2023-04-04 PROCEDURE — 25000003 PHARM REV CODE 250: Performed by: STUDENT IN AN ORGANIZED HEALTH CARE EDUCATION/TRAINING PROGRAM

## 2023-04-04 PROCEDURE — 80053 COMPREHEN METABOLIC PANEL: CPT | Performed by: STUDENT IN AN ORGANIZED HEALTH CARE EDUCATION/TRAINING PROGRAM

## 2023-04-04 PROCEDURE — 83735 ASSAY OF MAGNESIUM: CPT | Performed by: STUDENT IN AN ORGANIZED HEALTH CARE EDUCATION/TRAINING PROGRAM

## 2023-04-04 PROCEDURE — G0378 HOSPITAL OBSERVATION PER HR: HCPCS

## 2023-04-04 PROCEDURE — 84100 ASSAY OF PHOSPHORUS: CPT | Performed by: STUDENT IN AN ORGANIZED HEALTH CARE EDUCATION/TRAINING PROGRAM

## 2023-04-04 PROCEDURE — 94761 N-INVAS EAR/PLS OXIMETRY MLT: CPT

## 2023-04-04 PROCEDURE — 25000003 PHARM REV CODE 250: Performed by: SURGERY

## 2023-04-04 PROCEDURE — 36415 COLL VENOUS BLD VENIPUNCTURE: CPT | Performed by: STUDENT IN AN ORGANIZED HEALTH CARE EDUCATION/TRAINING PROGRAM

## 2023-04-04 PROCEDURE — 85025 COMPLETE CBC W/AUTO DIFF WBC: CPT | Performed by: STUDENT IN AN ORGANIZED HEALTH CARE EDUCATION/TRAINING PROGRAM

## 2023-04-04 PROCEDURE — 99900035 HC TECH TIME PER 15 MIN (STAT)

## 2023-04-04 PROCEDURE — 82962 GLUCOSE BLOOD TEST: CPT | Mod: 91

## 2023-04-04 RX ORDER — DOXYCYCLINE 100 MG/1
100 CAPSULE ORAL EVERY 12 HOURS
Qty: 30 CAPSULE | Refills: 1 | Status: SHIPPED | OUTPATIENT
Start: 2023-04-04 | End: 2023-12-20 | Stop reason: ALTCHOICE

## 2023-04-04 RX ORDER — DIPHENOXYLATE HYDROCHLORIDE AND ATROPINE SULFATE 2.5; .025 MG/1; MG/1
1 TABLET ORAL 2 TIMES DAILY
Qty: 40 TABLET | Refills: 0 | Status: SHIPPED | OUTPATIENT
Start: 2023-04-04 | End: 2023-04-24

## 2023-04-04 RX ADMIN — DIPHENOXYLATE HYDROCHLORIDE AND ATROPINE SULFATE 1 TABLET: 2.5; .025 TABLET ORAL at 08:04

## 2023-04-04 RX ADMIN — POTASSIUM BICARBONATE 25 MEQ: 978 TABLET, EFFERVESCENT ORAL at 08:04

## 2023-04-04 RX ADMIN — POTASSIUM PHOSPHATE, MONOBASIC 500 MG: 500 TABLET, SOLUBLE ORAL at 08:04

## 2023-04-04 RX ADMIN — LOSARTAN POTASSIUM 50 MG: 50 TABLET, FILM COATED ORAL at 08:04

## 2023-04-04 NOTE — ED NOTES
Care assumed.  Report received from MARIANA Patiño.   Patient is resting comfortably in bed with side rails up, call bell in reach, and the bed locked and in its lowest position.   Patient appears to be in no acute distress and has no complaints at this time.   Patient denies chest pain and shortness of breath.   Respirations are even and unlabored with equal chest rise and fall.   Patient given plan of care update. With no complaints or concerns from the patient, the nurse will continue to monitor patient and keep patient up-to-date with progress of care plan and provide support. Patient advised to call with any needs.   All IV Sites are of normal skin temperature and are free from any pain, redness, swelling at this time. Pulses are palpable distal to all sites with normal skin temperature and sensation noted for patients current condition and situation. Pt has no complaints, and agrees to call with any needs or changes.   The patient has been admitted and is waiting for reports to be called to be transported to assigned room.  Safety Check   Bed locked and low, call bell within reach, side rails up X 2. Pt advised and agrees to call with any needs or changes.

## 2023-04-04 NOTE — PLAN OF CARE
Pt is set to d/c today with the help of son Andry [phone]. SW requested f/u appts for pt. Pt is cleared to d/c home today. Rounds completed on pt. All questions addressed. Bedside nurse to discuss d/c medications. Discussed importance to attend all f/u appts and take medications as prescribed. Verbalized understanding. Case Management to sign off.     Froylan Manny, MSW  [phone]    Future Appointments   Date Time Provider Department Center   7/31/2023  9:45 AM Maria Alejandra Singh MD [facility]   8/24/2023  2:00 PM Ulisses Horton MD [facility]        04/04/23 1157   Final Note   Assessment Type Final Discharge Note   Anticipated Discharge Disposition Home   What phone number can be called within the next 1-3 days to see how you are doing after discharge? [phone]   Hospital Resources/Appts/Education Provided Appointments scheduled by Navigator/Coordinator   Post-Acute Status   Coverage humana   Discharge Delays None known at this time

## 2023-04-04 NOTE — PLAN OF CARE
AVS and educational attachments shared with patient via [employer]. Discussed thoroughly with the assistance from  [employer] #[id number]. Patient verbalized clear understanding using teach back method. Notified bedside nurse of completion of education. At present no distress noted. Patient to be discharged via w/c with escort service and family with all of patient's belonings. Will cont to be available to patient and family and intervene prn.

## 2023-04-04 NOTE — PLAN OF CARE
04/03/23 2159   Admission   Initial VN Admission Questions Complete   Communication Issues? None  (Admission questions completed via  services)   Shift   Virtual Nurse - Rounding Complete   Virtual Nurse - Patient Verbalized Approval Of Camera Use;VN Rounding   Type of Frequent Check   Type Patient Rounds   Safety/Activity   Patient Rounds bed in low position;bed wheels locked;call light in patient/parent reach;clutter free environment maintained;ID band on;visualized patient;placement of personal items at bedside   Safety Promotion/Fall Prevention Fall Risk reviewed with patient/family;medications reviewed;side rails raised x 2;instructed to call staff for mobility   Positioning   Body Position position changed independently   Pain/Comfort/Sleep   Preferred Pain Scale number (Numeric Rating Pain Scale)   Comfort/Acceptable Pain Level 3   Pain Rating (0-10): Rest 0   Admission questions completed via  services ( number [id number])  Introduced patient to virtual nursing model, patient verbalized understanding. Educated patient on fall prevention protocol, updated on plan of care. Opportunity given for pt's questions. All questions answered. Requesting medication for sleep and diarrhea; prn meds available. Bedside nurse MARIANA Christian, notified.

## 2023-04-04 NOTE — PROGRESS NOTES
[facility] Medicine Progress Note    Primary Team: [facility] [place] Hospital[facility] Team B  Attending Physician: Ulisses Horton MD  Resident: MD Alex  Intern: MD Camille    Hospital Day: 0 days    Chief Complaint: Right-sided abdominal pain, nausea, vomiting, and diarrhea for approximately three days prior to admission    Subjective:      Patient seen and examined by me this morning. She reports that her diarrhea has not yet improved. She denies any episodes of nausea or vomiting, abdominal pain, chest pain, or shortness of breath. She reports that she is hungry and would like to eat more.     Objective:   Last 24 Hour Vital Signs:  BP  Min: 110/73  Max: 134/80  Temp  Av.9 °F (36.6 °C)  Min: 97.2 °F (36.2 °C)  Max: 98.3 °F (36.8 °C)  Pulse  Av.4  Min: 70  Max: 109  Resp  Av.6  Min: 14  Max: 21  SpO2  Av %  Min: 94 %  Max: 99 %  Weight  Av.6 kg (124 lb 12.5 oz)  Min: 41.6 kg (91 lb 11.4 oz)  Max: 71.6 kg (157 lb 13.6 oz)    Intake/Output Summary (Last 24 hours) at 2023 0840  Last data filed at 2023 0635  Gross per 24 hour   Intake 1172.34 ml   Output --   Net 1172.34 ml        Physical Examination:  Physical Exam   Constitutional: She is oriented to person, place, and time. normal appearance. No distress.   HENT:   Head: Normocephalic and atraumatic.   Right Ear: External ear normal.   Left Ear: External ear normal.   Nose: Nose normal.   Mouth/Throat: Mucous membranes are moist. No oropharyngeal exudate or posterior oropharyngeal erythema. Oropharynx is clear.   Eyes: Pupils are equal, round, and reactive to light. Conjunctivae are normal. Right eye exhibits no discharge. Left eye exhibits no discharge. No scleral icterus.   Cardiovascular: Normal rate, regular rhythm, normal heart sounds and normal pulses. Pulmonary:      Effort: Pulmonary effort is normal.      Breath sounds: Normal breath sounds.   Abdominal: Normal appearance. Abdomen is soft. There is no tenderness. There is no  rebound and no guarding.   Multiple healed surgical scars noted to anterior abdominal wall   Musculoskeletal:         General: Normal range of motion.      Cervical back: Normal range of motion and neck supple.      Right lower leg: No edema.      Left lower leg: No edema.   Neurological: She is alert and oriented to person, place, and time.   Skin: Skin is warm and dry.   Psychiatric: Her behavior is normal. Mood, judgment and thought content normal.   Nursing note and vitals reviewed.     Laboratory:  Recent Labs   Lab 04/02/23  1540 04/02/23  2321 04/03/23  0605 04/04/23  0427   WBC 5.03  --  7.66 5.35   HGB 15.3  --  13.9 12.5   HCT 45.1  --  42.2 37.5     --  237 279   MCV 83  --  85 83   RDW 13.4  --  13.7 13.6    138 136 137   K 4.0 3.4* 4.0 3.4*    105 107 107   CO2 23 17* 16* 20*   BUN 20 19 21 14   CREATININE 1.3 0.9 1.0 1.1   * 99 85 134*   PROT 8.5*  --  6.3 6.1   ALBUMIN 3.6  --  2.5* 2.5*   BILITOT 0.6  --  0.4 0.2   AST 20  --  16 15   ALKPHOS 110  --  74 70   ALT 19  --  12 12       Microbiology Results (last 7 days)       Procedure Component Value Units Date/Time    E. coli 0157 antigen [[id number]] Collected: 04/03/23 1258    Order Status: Completed Specimen: Stool Updated: 04/04/23 0808     Shiga Toxin 1 E.coli Negative     Shiga Toxin 2 E.coli Negative    Stool culture [[id number]] Collected: 04/03/23 1258    Order Status: Sent Specimen: Stool Updated: 04/03/23 1938          I have personally reviewed the above laboratory studies.     Imaging:  EKG (my interpretation): no new EKG    - no new imaging     Current Medications:     Scheduled:   losartan  50 mg Oral Daily    potassium bicarbonate  25 mEq Oral Once    potassium phosphate (monobasic)  500 mg Oral BID    pravastatin  40 mg Oral QHS         Infusions:         PRN:  dextrose 10%, dextrose 10%, diphenoxylate-atropine 2.5-0.025 mg, glucagon (human recombinant), insulin aspart U-100, melatonin, ondansetron, sodium  chloride 0.9%  Antibiotics and Day Number of Therapy:  Antibiotics (From admission, onward)      None            Assessment:     Azucena Morrison is a 72 y.o. female with a PMHx of CKD stage 3b, T2DM, HTN, and HLD presenting with abdominal pain, nausea, vomiting, and diarrhea for several days' duration. Exam significant for dry mucous membranes, TTP in RUQ and RLQ of abdomen. Workup significant for findings consistent with SBO on CT A/P with contrast. Patient admitted to General Surgery for SBO. Medicine consulted for co-management of comorbidities.     Plan:     Small Bowel Obstruction  - patient with several days of abdominal pain accompanied by nausea, vomiting, and diarrhea  - of note, has extensive abdominal surgical history; her procedures have been at [facility] and [facility]  - on exam, RUQ and RLQ of abdomen TTP without rebound or guarding  - lactate WNL  - CT A/P with wall thickening of small bowel loops, multiple air-fluid levels suggestive of partial SBO  - patient admitted to General Surgery, SBO management per general surgery     Diarrhea  Dehydration  - patient with three days of diarrhea that is yellow and without obvious blood  - patient reports marked thirst and requests water multiple times on initial interview  - on exam, dry mucous membranes noted  - urine specific gravity >1.030  - stool studies negative for blood, WBCs, and rotavirus  - continuing gentle IV fluids as needed  - obtaining stool studies for parasites and fecal fat pending  - If diarrhea does not improve by 4/5 consider GI consult     Anion Gap Metabolic Acidosis, resolved  Non-Gap Metabolic Acidosis 2/2 acute diarrhea  - bicarb 23, anion gap 17 on admission  - lactate WNL  - of note, patient is on SGLT-2i so euglycemic DKA a consideration; however, patient's glucose is 132 and she has other pathology that would explain her abdominal pain, nausea, and vomiting  - AGMA resolved after IVF resuscitation; patient continues to have  non-gap acidosis, which is expected with her diarrhea    Essential HTN  - home regimen includes HCTZ 25mg daily, Coreg CR 80mg daily, olmesartan 20mg daily  - holding HCTZ in the setting of volume depletion  - continuing losartan 50mg daily as olmesartan is non-formulary  - can restart Coreg if persistently hypertensive     Type II DM, with long term use of insulin, complicated by diabetic renal disease  - A1c at 5.8% in 1/2023; improved from 9.3% in 9/2022  - home regimen includes metformin 1000mg BID, Lantus 10U QAM, Jardiance 25mg daily, and Trulicity 3mg every Monday  - on low-intensity statin with pravastatin 20mg daily  - Recommend diabetic diet, continuing low-dose SSI with ACHS Accu-Checks; will resume home basal insulin when appropriate  - holding other PO antidiabetics at this time     CKD Stage IIIb  - likely multifactorial due to HTN and DM renal disease  - complicated by persistent proteinuria  - follows outpatient with Dr. Singh  - on multiple antidiabetic agents  - also on Kerendia (MRA) per nephrologist  - BUN/Cr stable on admission  - is noted to have 3+ protein on UA; she also has 4+ glucose but is on an SGLT-2i  - P/C ratio elevated; will optimize renal-protective medication regimen, consider further workup as necessary  - will continue to monitor     Mixed HLD  - on low-intensity statin with pravastatin 20mg daily  - continuing statin     Protein Gap, improving  - total protein 6.1, albumin 2.5 with gap of 3.6  - of note, does have kappa/lambda FLC ratio at upper limit of normal; this is being worked up by his outpatient nephrologist     Health Care Maintenance  - HCV screen UTD  - MMG UTD  - of note, is non-immune to HBV per serology in 1/2023  - Vaccinations needed: HBV on outpatient basis     Ppx: Lovenox  Diet: diet per General Surgery  Code: Full Code    Beronica Obrien M.D.  [facility] Internal Medicine PGY-1    [facility] Medicine Hospitalist Pager numbers:   [facility] Hospitalist Medicine Team A  (Omero/Cuauhtemoc): [phone]  [facility] [place] Hospitalist Medicine Team B (Elizabet/Peng):  [phone]

## 2023-04-04 NOTE — PROGRESS NOTES
Surgery follow up  /82   Pulse 70   Temp 97.8 °F (36.6 °C)   Resp 20   Wt 41.6 kg (91 lb 11.4 oz)   LMP  (LMP Unknown)   SpO2 96%   BMI 16.77 kg/m²   I/O last 3 completed shifts:  In: 2927.9 [P.O.:340; I.V.:1401.3; IV Piggyback:1186.5]  Out: -   No intake/output data recorded.    Recent Results (from the past 336 hour(s))   CBC Auto Differential    Collection Time: 04/04/23  4:27 AM   Result Value Ref Range    WBC 5.35 3.90 - 12.70 K/uL    Hemoglobin 12.5 12.0 - 16.0 g/dL    Hematocrit 37.5 37.0 - 48.5 %    Platelets 279 150 - 450 K/uL   CBC auto differential    Collection Time: 04/03/23  6:05 AM   Result Value Ref Range    WBC 7.66 3.90 - 12.70 K/uL    Hemoglobin 13.9 12.0 - 16.0 g/dL    Hematocrit 42.2 37.0 - 48.5 %    Platelets 237 150 - 450 K/uL     Better , no more abdominal pain diarrhea better     Can be discharged on po doxycycline and and lomotil will follow in office tow weeks.

## 2023-04-04 NOTE — PLAN OF CARE
VN note: Patient chart, labs, and vitals reviewed. VN to continue to be available as needed.     Problem: Adult Inpatient Plan of Care  Goal: Plan of Care Review  4/4/2023 1013 by Sagrario Villegas RN  Outcome: Ongoing, Progressing  4/4/2023 1013 by Sagrario Villegas RN  Outcome: Ongoing, Progressing  Goal: Patient-Specific Goal (Individualized)  4/4/2023 1013 by Sagrario Villegas RN  Outcome: Ongoing, Progressing  4/4/2023 1013 by Sagrario Villegas RN  Outcome: Ongoing, Progressing  Goal: Absence of Hospital-Acquired Illness or Injury  4/4/2023 1013 by Sagrario Villegas RN  Outcome: Ongoing, Progressing  4/4/2023 1013 by Sagrario Villegas RN  Outcome: Ongoing, Progressing  Goal: Optimal Comfort and Wellbeing  4/4/2023 1013 by Sagrario Villegas RN  Outcome: Ongoing, Progressing  4/4/2023 1013 by Sagrario Villegas RN  Outcome: Ongoing, Progressing  Goal: Readiness for Transition of Care  4/4/2023 1013 by Sagrario Villegas RN  Outcome: Ongoing, Progressing  4/4/2023 1013 by Sagrario Villegas RN  Outcome: Ongoing, Progressing

## 2023-04-05 LAB — BACTERIA STL CULT: NORMAL

## 2023-04-05 NOTE — DISCHARGE SUMMARY
[facility] Surg  General Surgery  Discharge Summary      Patient Name: Azucena Morrison  MRN: [id number]  Admission Date: 4/2/2023  Hospital Length of Stay: 0 days  Discharge Date and Time: 4/4/2023  3:18 PM  Attending Physician: No att. providers found   Discharging Provider: Ulisses Horton MD  Primary Care Provider: Pj Sanches MD     HPI: admitted with abdominal pain associated with nausea nad vomiting , was in [facility] , cam back here for treatmwent , known s.p colon resection and closure of colostomy , known to have ventral hernia, known diabetic    * No surgery found *     Hospital Course: admitted with diagnosis of small bowel obstruction , , kept npo , iv fluids , , antibiotics , seen by medicine for control of diabetes . Started having diarrhea  treated with lomotil did well.started on diet tolerated well   Discharged home to be followed in office in two weeks    Consults:   Consults (From admission, onward)          Status Ordering Provider     Inpatient consult to Internal Medicine  Once        Provider:  Gustavo Hernandez MD    Completed SAMIA CRANE            Significant Diagnostic Studies: Labs: BMP:   Recent Labs   Lab 04/04/23 0427   *      K 3.4*      CO2 20*   BUN 14   CREATININE 1.1   CALCIUM 8.4*   MG 1.9    and CBC   Recent Labs   Lab 04/04/23 0427   WBC 5.35   HGB 12.5   HCT 37.5          Pending Diagnostic Studies:       Procedure Component Value Units Date/Time    Fecal fat, qualitative [[id number]] Collected: 04/03/23 1258    Order Status: Sent Lab Status: In process Updated: 04/03/23 1942    Specimen: Stool     Stool Exam-Ova,Cysts,Parasites [[id number]] Collected: 04/03/23 1258    Order Status: Sent Lab Status: In process Updated: 04/03/23 2013    Specimen: Stool           Final Active Diagnoses:    Diagnosis Date Noted POA    PRINCIPAL PROBLEM:  Small bowel obstruction [K56.609] 04/02/2023 Yes    Ventral hernia without obstruction or  "gangrene [K43.9] 09/15/2022 Yes    Lower abdominal pain [R10.30]  Yes    S/P exploratory laparotomy [Z98.890]  Not Applicable    History of hemicolectomy [Z90.49] 05/17/2016 Not Applicable     Chronic    Type 2 diabetes mellitus without complication, with long-term current use of insulin [E11.9, Z79.4] 05/17/2016 Not Applicable     Chronic      Problems Resolved During this Admission:      Discharged Condition: good    Disposition: Home or Self Care    Follow Up:    Patient Instructions:   No discharge procedures on file.  Medications:  Reconciled Home Medications:      Medication List        START taking these medications      diphenoxylate-atropine 2.5-0.025 mg 2.5-0.025 mg per tablet  Commonly known as: LOMOTIL  Take 1 tablet by mouth 2 (two) times a day. for 10 days     doxycycline 100 MG Cap  Commonly known as: VIBRAMYCIN  Take 1 capsule (100 mg total) by mouth every 12 (twelve) hours.            CONTINUE taking these medications      acetaminophen 500 MG tablet  Commonly known as: TYLENOL  Take 500 mg by mouth every 6 (six) hours as needed for Pain.     BD ONEIL 2ND GEN PEN NEEDLE 32 gauge x 5/32" Ndle  Generic drug: pen needle, diabetic     carvedilol 80 MG 24 hr capsule  Commonly known as: COREG CR  Take 80 mg by mouth once daily.     empagliflozin 25 mg tablet  Commonly known as: JARDIANCE  Take 1 tablet (25 mg total) by mouth once daily.     hydroCHLOROthiazide 25 MG tablet  Commonly known as: HYDRODIURIL  [place] tre tableta (25 mg en total) por vía oral diariamente.  (Take 1 tablet (25 mg total) by mouth once daily.)     KERENDIA 10 mg Tab  Generic drug: finerenone  Take 10 tablets by mouth once daily.     LANTUS SOLOSTAR U-100 INSULIN glargine 100 units/mL SubQ pen  Generic drug: insulin  Inject 10 Units into the skin every morning.     metFORMIN 1000 MG tablet  Commonly known as: GLUCOPHAGE  Take 1,000 mg by mouth 2 (two) times daily with meals.     olmesartan 20 MG tablet  Commonly known as: BENICAR  Take " 1 tablet (20 mg total) by mouth once daily.     omeprazole 20 MG capsule  Commonly known as: PRILOSEC  Take 20 mg by mouth once daily.     pravastatin 40 MG tablet  Commonly known as: PRAVACHOL  Take 40 mg by mouth every evening.     * TRUE METRIX GLUCOSE TEST STRIP Strp  Generic drug: blood sugar diagnostic     * blood sugar diagnostic Strp  100 each by Misc.(Non-Drug; Combo Route) route once daily.     TRUEPLUS LANCETS 30 gauge Misc  Generic drug: lancets     TRULICITY 3 mg/0.5 mL pen injector  Generic drug: dulaglutide  Inject 3 mg into the skin every Monday.           * This list has 2 medication(s) that are the same as other medications prescribed for you. Read the directions carefully, and ask your doctor or other care provider to review them with you.                STOP taking these medications      ondansetron 4 MG tablet  Commonly known as: ELISSA Horton MD  General Surgery  [facility] Surg

## 2023-04-06 LAB
FAT STL QL: NORMAL
NEUTRAL FAT STL QL: NORMAL

## 2023-04-15 LAB — O+P STL MICRO: NORMAL

## 2023-05-02 PROBLEM — R19.7 DIARRHEA: Status: ACTIVE | Noted: 2023-05-02

## 2023-07-29 ENCOUNTER — LAB VISIT (OUTPATIENT)
Dept: LAB | Facility: HOSPITAL | Age: 73
End: 2023-07-29
Payer: MEDICARE

## 2023-07-29 DIAGNOSIS — E11.9 TYPE 2 DIABETES MELLITUS WITHOUT COMPLICATION, WITH LONG-TERM CURRENT USE OF INSULIN: ICD-10-CM

## 2023-07-29 DIAGNOSIS — N25.81 SECONDARY HYPERPARATHYROIDISM OF RENAL ORIGIN: ICD-10-CM

## 2023-07-29 DIAGNOSIS — Z79.4 TYPE 2 DIABETES MELLITUS WITHOUT COMPLICATION, WITH LONG-TERM CURRENT USE OF INSULIN: ICD-10-CM

## 2023-07-29 DIAGNOSIS — R80.1 PERSISTENT PROTEINURIA: ICD-10-CM

## 2023-07-29 LAB
ALBUMIN SERPL BCP-MCNC: 3.5 G/DL (ref 3.5–5.2)
ALBUMIN SERPL BCP-MCNC: 3.5 G/DL (ref 3.5–5.2)
ANION GAP SERPL CALC-SCNC: 12 MMOL/L (ref 8–16)
ANION GAP SERPL CALC-SCNC: 12 MMOL/L (ref 8–16)
BACTERIA #/AREA URNS HPF: ABNORMAL /HPF
BASOPHILS # BLD AUTO: 0.04 K/UL (ref 0–0.2)
BASOPHILS NFR BLD: 0.6 % (ref 0–1.9)
BILIRUB UR QL STRIP: NEGATIVE
BUN SERPL-MCNC: 20 MG/DL (ref 8–23)
BUN SERPL-MCNC: 20 MG/DL (ref 8–23)
C3 SERPL-MCNC: 172 MG/DL (ref 50–180)
C4 SERPL-MCNC: 37 MG/DL (ref 11–44)
CALCIUM SERPL-MCNC: 9.5 MG/DL (ref 8.7–10.5)
CALCIUM SERPL-MCNC: 9.5 MG/DL (ref 8.7–10.5)
CHLORIDE SERPL-SCNC: 104 MMOL/L (ref 95–110)
CHLORIDE SERPL-SCNC: 104 MMOL/L (ref 95–110)
CLARITY UR: CLEAR
CO2 SERPL-SCNC: 20 MMOL/L (ref 23–29)
CO2 SERPL-SCNC: 20 MMOL/L (ref 23–29)
COLOR UR: COLORLESS
CREAT SERPL-MCNC: 1.3 MG/DL (ref 0.5–1.4)
CREAT SERPL-MCNC: 1.3 MG/DL (ref 0.5–1.4)
CREAT UR-MCNC: 34.8 MG/DL (ref 15–325)
DIFFERENTIAL METHOD: NORMAL
EOSINOPHIL # BLD AUTO: 0.1 K/UL (ref 0–0.5)
EOSINOPHIL NFR BLD: 2.1 % (ref 0–8)
ERYTHROCYTE [DISTWIDTH] IN BLOOD BY AUTOMATED COUNT: 13.8 % (ref 11.5–14.5)
EST. GFR  (NO RACE VARIABLE): 44 ML/MIN/1.73 M^2
EST. GFR  (NO RACE VARIABLE): 44 ML/MIN/1.73 M^2
ESTIMATED AVG GLUCOSE: 163 MG/DL (ref 68–131)
GLUCOSE SERPL-MCNC: 165 MG/DL (ref 70–110)
GLUCOSE SERPL-MCNC: 165 MG/DL (ref 70–110)
GLUCOSE UR QL STRIP: ABNORMAL
HBA1C MFR BLD: 7.3 % (ref 4–5.6)
HCT VFR BLD AUTO: 43.3 % (ref 37–48.5)
HGB BLD-MCNC: 14.5 G/DL (ref 12–16)
HGB UR QL STRIP: ABNORMAL
HYALINE CASTS #/AREA URNS LPF: 0 /LPF
IMM GRANULOCYTES # BLD AUTO: 0.03 K/UL (ref 0–0.04)
IMM GRANULOCYTES NFR BLD AUTO: 0.5 % (ref 0–0.5)
KETONES UR QL STRIP: NEGATIVE
LEUKOCYTE ESTERASE UR QL STRIP: ABNORMAL
LYMPHOCYTES # BLD AUTO: 1.8 K/UL (ref 1–4.8)
LYMPHOCYTES NFR BLD: 26.3 % (ref 18–48)
MCH RBC QN AUTO: 27.9 PG (ref 27–31)
MCHC RBC AUTO-ENTMCNC: 33.5 G/DL (ref 32–36)
MCV RBC AUTO: 83 FL (ref 82–98)
MICROSCOPIC COMMENT: ABNORMAL
MONOCYTES # BLD AUTO: 0.5 K/UL (ref 0.3–1)
MONOCYTES NFR BLD: 8.1 % (ref 4–15)
NEUTROPHILS # BLD AUTO: 4.2 K/UL (ref 1.8–7.7)
NEUTROPHILS NFR BLD: 62.4 % (ref 38–73)
NITRITE UR QL STRIP: NEGATIVE
NRBC BLD-RTO: 0 /100 WBC
PH UR STRIP: 7 [PH] (ref 5–8)
PHOSPHATE SERPL-MCNC: 2.9 MG/DL (ref 2.7–4.5)
PHOSPHATE SERPL-MCNC: 2.9 MG/DL (ref 2.7–4.5)
PLATELET # BLD AUTO: 277 K/UL (ref 150–450)
PMV BLD AUTO: 9.4 FL (ref 9.2–12.9)
POTASSIUM SERPL-SCNC: 4 MMOL/L (ref 3.5–5.1)
POTASSIUM SERPL-SCNC: 4 MMOL/L (ref 3.5–5.1)
PROT UR QL STRIP: ABNORMAL
PROT UR-MCNC: 146 MG/DL (ref 0–15)
PROT/CREAT UR: 4.2 MG/G{CREAT} (ref 0–0.2)
PTH-INTACT SERPL-MCNC: 231.1 PG/ML (ref 9–77)
RBC # BLD AUTO: 5.19 M/UL (ref 4–5.4)
RBC #/AREA URNS HPF: 4 /HPF (ref 0–4)
SODIUM SERPL-SCNC: 136 MMOL/L (ref 136–145)
SODIUM SERPL-SCNC: 136 MMOL/L (ref 136–145)
SP GR UR STRIP: 1.01 (ref 1–1.03)
SQUAMOUS #/AREA URNS HPF: 7 /HPF
URATE SERPL-MCNC: 5.8 MG/DL (ref 2.4–5.7)
URN SPEC COLLECT METH UR: ABNORMAL
UROBILINOGEN UR STRIP-ACNC: NEGATIVE EU/DL
WBC # BLD AUTO: 6.66 K/UL (ref 3.9–12.7)
WBC #/AREA URNS HPF: 77 /HPF (ref 0–5)
YEAST URNS QL MICRO: ABNORMAL

## 2023-07-29 PROCEDURE — 81000 URINALYSIS NONAUTO W/SCOPE: CPT | Performed by: INTERNAL MEDICINE

## 2023-07-29 PROCEDURE — 86160 COMPLEMENT ANTIGEN: CPT | Performed by: INTERNAL MEDICINE

## 2023-07-29 PROCEDURE — 83036 HEMOGLOBIN GLYCOSYLATED A1C: CPT | Performed by: INTERNAL MEDICINE

## 2023-07-29 PROCEDURE — 80069 RENAL FUNCTION PANEL: CPT | Performed by: INTERNAL MEDICINE

## 2023-07-29 PROCEDURE — 86225 DNA ANTIBODY NATIVE: CPT | Performed by: INTERNAL MEDICINE

## 2023-07-29 PROCEDURE — 86334 PATHOLOGIST INTERPRETATION IFE: ICD-10-PCS | Mod: 26,,, | Performed by: PATHOLOGY

## 2023-07-29 PROCEDURE — 83521 IG LIGHT CHAINS FREE EACH: CPT | Mod: 59 | Performed by: INTERNAL MEDICINE

## 2023-07-29 PROCEDURE — 86334 IMMUNOFIX E-PHORESIS SERUM: CPT | Performed by: INTERNAL MEDICINE

## 2023-07-29 PROCEDURE — 86334 IMMUNOFIX E-PHORESIS SERUM: CPT | Mod: 26,,, | Performed by: PATHOLOGY

## 2023-07-29 PROCEDURE — 85025 COMPLETE CBC W/AUTO DIFF WBC: CPT | Performed by: INTERNAL MEDICINE

## 2023-07-29 PROCEDURE — 84550 ASSAY OF BLOOD/URIC ACID: CPT | Performed by: INTERNAL MEDICINE

## 2023-07-29 PROCEDURE — 84156 ASSAY OF PROTEIN URINE: CPT | Performed by: INTERNAL MEDICINE

## 2023-07-29 PROCEDURE — 36415 COLL VENOUS BLD VENIPUNCTURE: CPT | Performed by: INTERNAL MEDICINE

## 2023-07-29 PROCEDURE — 86038 ANTINUCLEAR ANTIBODIES: CPT | Performed by: INTERNAL MEDICINE

## 2023-07-29 PROCEDURE — 86235 NUCLEAR ANTIGEN ANTIBODY: CPT | Mod: 59 | Performed by: INTERNAL MEDICINE

## 2023-07-29 PROCEDURE — 86160 COMPLEMENT ANTIGEN: CPT | Mod: 59 | Performed by: INTERNAL MEDICINE

## 2023-07-29 PROCEDURE — 83970 ASSAY OF PARATHORMONE: CPT | Performed by: INTERNAL MEDICINE

## 2023-07-29 PROCEDURE — 86039 ANTINUCLEAR ANTIBODIES (ANA): CPT | Performed by: INTERNAL MEDICINE

## 2023-07-31 LAB
ANA PATTERN 1: NORMAL
ANA SER QL IF: POSITIVE
ANA TITR SER IF: NORMAL {TITER}
INTERPRETATION SERPL IFE-IMP: NORMAL
KAPPA LC SER QL IA: 7.86 MG/DL (ref 0.33–1.94)
KAPPA LC/LAMBDA SER IA: 1.64 (ref 0.26–1.65)
LAMBDA LC SER QL IA: 4.78 MG/DL (ref 0.57–2.63)
PATHOLOGIST INTERPRETATION IFE: NORMAL

## 2023-08-01 LAB
ANTI SM ANTIBODY: 0.06 RATIO (ref 0–0.99)
ANTI SM/RNP ANTIBODY: 0.07 RATIO (ref 0–0.99)
ANTI-SM INTERPRETATION: NEGATIVE
ANTI-SM/RNP INTERPRETATION: NEGATIVE
ANTI-SSA ANTIBODY: 0.06 RATIO (ref 0–0.99)
ANTI-SSA INTERPRETATION: NEGATIVE
ANTI-SSB ANTIBODY: 0.07 RATIO (ref 0–0.99)
ANTI-SSB INTERPRETATION: NEGATIVE
DSDNA AB SER-ACNC: NORMAL [IU]/ML

## 2023-08-28 NOTE — PROGRESS NOTES
IN ORDER TO FIND TODAY'S PROGRESS NOTE IN WOUND EXPERT, LOOK IN THE MEDIA TAB.   normal/clear to auscultation bilaterally/no wheezes/no rales/no rhonchi normal/clear to auscultation bilaterally/no wheezes/no rales/no rhonchi normal/clear to auscultation bilaterally/no wheezes/no rales/no rhonchi

## 2023-09-26 PROBLEM — R22.43: Status: ACTIVE | Noted: 2023-09-26

## 2023-10-13 ENCOUNTER — ANESTHESIA EVENT (OUTPATIENT)
Dept: SURGERY | Facility: HOSPITAL | Age: 73
End: 2023-10-13
Payer: MEDICARE

## 2023-10-13 ENCOUNTER — ANESTHESIA (OUTPATIENT)
Dept: SURGERY | Facility: HOSPITAL | Age: 73
End: 2023-10-13
Payer: MEDICARE

## 2023-10-13 ENCOUNTER — HOSPITAL ENCOUNTER (OUTPATIENT)
Facility: HOSPITAL | Age: 73
Discharge: HOME | End: 2023-10-13
Attending: SURGERY | Admitting: SURGERY
Payer: MEDICARE

## 2023-10-13 VITALS
BODY MASS INDEX: 30.73 KG/M2 | OXYGEN SATURATION: 99 % | WEIGHT: 167 LBS | HEART RATE: 70 BPM | TEMPERATURE: 98 F | DIASTOLIC BLOOD PRESSURE: 74 MMHG | SYSTOLIC BLOOD PRESSURE: 146 MMHG | RESPIRATION RATE: 18 BRPM | HEIGHT: 62 IN

## 2023-10-13 DIAGNOSIS — N18.31 STAGE 3A CHRONIC KIDNEY DISEASE: Chronic | ICD-10-CM

## 2023-10-13 DIAGNOSIS — Z79.4 TYPE 2 DIABETES MELLITUS WITHOUT COMPLICATION, WITH LONG-TERM CURRENT USE OF INSULIN: Chronic | ICD-10-CM

## 2023-10-13 DIAGNOSIS — E11.9 TYPE 2 DIABETES MELLITUS WITHOUT COMPLICATION, WITH LONG-TERM CURRENT USE OF INSULIN: Chronic | ICD-10-CM

## 2023-10-13 DIAGNOSIS — R22.43: Primary | ICD-10-CM

## 2023-10-13 DIAGNOSIS — K43.9 VENTRAL HERNIA WITHOUT OBSTRUCTION OR GANGRENE: ICD-10-CM

## 2023-10-13 DIAGNOSIS — I10 ESSENTIAL HYPERTENSION: Chronic | ICD-10-CM

## 2023-10-13 LAB — POCT GLUCOSE: 107 MG/DL (ref 70–110)

## 2023-10-13 PROCEDURE — D9220A PRA ANESTHESIA: Mod: CRNA,,, | Performed by: STUDENT IN AN ORGANIZED HEALTH CARE EDUCATION/TRAINING PROGRAM

## 2023-10-13 PROCEDURE — 71000015 HC POSTOP RECOV 1ST HR: Performed by: SURGERY

## 2023-10-13 PROCEDURE — D9220A PRA ANESTHESIA: ICD-10-PCS | Mod: CRNA,,, | Performed by: STUDENT IN AN ORGANIZED HEALTH CARE EDUCATION/TRAINING PROGRAM

## 2023-10-13 PROCEDURE — 36000706: Performed by: SURGERY

## 2023-10-13 PROCEDURE — 63600175 PHARM REV CODE 636 W HCPCS: Performed by: STUDENT IN AN ORGANIZED HEALTH CARE EDUCATION/TRAINING PROGRAM

## 2023-10-13 PROCEDURE — 63600175 PHARM REV CODE 636 W HCPCS: Performed by: SURGERY

## 2023-10-13 PROCEDURE — 25000003 PHARM REV CODE 250: Performed by: STUDENT IN AN ORGANIZED HEALTH CARE EDUCATION/TRAINING PROGRAM

## 2023-10-13 PROCEDURE — D9220A PRA ANESTHESIA: Mod: ANES,,, | Performed by: ANESTHESIOLOGY

## 2023-10-13 PROCEDURE — 37000009 HC ANESTHESIA EA ADD 15 MINS: Performed by: SURGERY

## 2023-10-13 PROCEDURE — 88307 TISSUE EXAM BY PATHOLOGIST: CPT | Mod: 26,,, | Performed by: PATHOLOGY

## 2023-10-13 PROCEDURE — 88307 TISSUE EXAM BY PATHOLOGIST: CPT | Performed by: PATHOLOGY

## 2023-10-13 PROCEDURE — 36000707: Performed by: SURGERY

## 2023-10-13 PROCEDURE — 71000016 HC POSTOP RECOV ADDL HR: Performed by: SURGERY

## 2023-10-13 PROCEDURE — D9220A PRA ANESTHESIA: ICD-10-PCS | Mod: ANES,,, | Performed by: ANESTHESIOLOGY

## 2023-10-13 PROCEDURE — 71000033 HC RECOVERY, INTIAL HOUR: Performed by: SURGERY

## 2023-10-13 PROCEDURE — 37000008 HC ANESTHESIA 1ST 15 MINUTES: Performed by: SURGERY

## 2023-10-13 PROCEDURE — 88307 PR  SURG PATH,LEVEL V: ICD-10-PCS | Mod: 26,,, | Performed by: PATHOLOGY

## 2023-10-13 PROCEDURE — 25000003 PHARM REV CODE 250: Performed by: SURGERY

## 2023-10-13 RX ORDER — ONDANSETRON 2 MG/ML
INJECTION INTRAMUSCULAR; INTRAVENOUS
Status: DISCONTINUED | OUTPATIENT
Start: 2023-10-13 | End: 2023-10-13

## 2023-10-13 RX ORDER — VANCOMYCIN HYDROCHLORIDE 1 G/20ML
INJECTION, POWDER, LYOPHILIZED, FOR SOLUTION INTRAVENOUS
Status: DISCONTINUED | OUTPATIENT
Start: 2023-10-13 | End: 2023-10-13 | Stop reason: HOSPADM

## 2023-10-13 RX ORDER — SODIUM CHLORIDE 9 MG/ML
INJECTION, SOLUTION INTRAVENOUS CONTINUOUS
Status: DISCONTINUED | OUTPATIENT
Start: 2023-10-13 | End: 2023-10-13 | Stop reason: HOSPADM

## 2023-10-13 RX ORDER — HYDROCODONE BITARTRATE AND ACETAMINOPHEN 5; 325 MG/1; MG/1
1 TABLET ORAL EVERY 4 HOURS PRN
Status: DISCONTINUED | OUTPATIENT
Start: 2023-10-13 | End: 2023-10-13 | Stop reason: HOSPADM

## 2023-10-13 RX ORDER — FENTANYL CITRATE 50 UG/ML
INJECTION, SOLUTION INTRAMUSCULAR; INTRAVENOUS
Status: DISCONTINUED | OUTPATIENT
Start: 2023-10-13 | End: 2023-10-13

## 2023-10-13 RX ORDER — DEXAMETHASONE SODIUM PHOSPHATE 4 MG/ML
INJECTION, SOLUTION INTRA-ARTICULAR; INTRALESIONAL; INTRAMUSCULAR; INTRAVENOUS; SOFT TISSUE
Status: DISCONTINUED | OUTPATIENT
Start: 2023-10-13 | End: 2023-10-13

## 2023-10-13 RX ORDER — ONDANSETRON 2 MG/ML
4 INJECTION INTRAMUSCULAR; INTRAVENOUS DAILY PRN
Status: DISCONTINUED | OUTPATIENT
Start: 2023-10-13 | End: 2023-10-13 | Stop reason: HOSPADM

## 2023-10-13 RX ORDER — MUPIROCIN 20 MG/G
OINTMENT TOPICAL
Status: DISCONTINUED | OUTPATIENT
Start: 2023-10-13 | End: 2023-10-13 | Stop reason: HOSPADM

## 2023-10-13 RX ORDER — HYDROCODONE BITARTRATE AND ACETAMINOPHEN 5; 325 MG/1; MG/1
1 TABLET ORAL EVERY 6 HOURS PRN
Qty: 24 TABLET | Refills: 0 | Status: SHIPPED | OUTPATIENT
Start: 2023-10-13 | End: 2023-12-20 | Stop reason: ALTCHOICE

## 2023-10-13 RX ORDER — CEFAZOLIN SODIUM 1 G/3ML
INJECTION, POWDER, FOR SOLUTION INTRAMUSCULAR; INTRAVENOUS
Status: DISCONTINUED | OUTPATIENT
Start: 2023-10-13 | End: 2023-10-13

## 2023-10-13 RX ORDER — DEXMEDETOMIDINE HYDROCHLORIDE 100 UG/ML
INJECTION, SOLUTION INTRAVENOUS
Status: DISCONTINUED | OUTPATIENT
Start: 2023-10-13 | End: 2023-10-13

## 2023-10-13 RX ORDER — LIDOCAINE HYDROCHLORIDE 20 MG/ML
INJECTION INTRAVENOUS
Status: DISCONTINUED | OUTPATIENT
Start: 2023-10-13 | End: 2023-10-13

## 2023-10-13 RX ORDER — OXYCODONE HYDROCHLORIDE 5 MG/1
5 TABLET ORAL
Status: DISCONTINUED | OUTPATIENT
Start: 2023-10-13 | End: 2023-10-13 | Stop reason: HOSPADM

## 2023-10-13 RX ORDER — HYDROMORPHONE HYDROCHLORIDE 2 MG/ML
0.5 INJECTION, SOLUTION INTRAMUSCULAR; INTRAVENOUS; SUBCUTANEOUS EVERY 5 MIN PRN
Status: DISCONTINUED | OUTPATIENT
Start: 2023-10-13 | End: 2023-10-13 | Stop reason: HOSPADM

## 2023-10-13 RX ORDER — PROPOFOL 10 MG/ML
INJECTION, EMULSION INTRAVENOUS
Status: DISCONTINUED | OUTPATIENT
Start: 2023-10-13 | End: 2023-10-13

## 2023-10-13 RX ORDER — ACETAMINOPHEN 325 MG/1
650 TABLET ORAL EVERY 4 HOURS PRN
Status: DISCONTINUED | OUTPATIENT
Start: 2023-10-13 | End: 2023-10-13 | Stop reason: HOSPADM

## 2023-10-13 RX ORDER — PHENYLEPHRINE HYDROCHLORIDE 10 MG/ML
INJECTION INTRAVENOUS
Status: DISCONTINUED | OUTPATIENT
Start: 2023-10-13 | End: 2023-10-13

## 2023-10-13 RX ORDER — LIDOCAINE HYDROCHLORIDE 10 MG/ML
INJECTION, SOLUTION EPIDURAL; INFILTRATION; INTRACAUDAL; PERINEURAL
Status: DISCONTINUED | OUTPATIENT
Start: 2023-10-13 | End: 2023-10-13 | Stop reason: HOSPADM

## 2023-10-13 RX ADMIN — PHENYLEPHRINE HYDROCHLORIDE 200 MCG: 10 INJECTION INTRAVENOUS at 10:10

## 2023-10-13 RX ADMIN — OXYCODONE HYDROCHLORIDE 5 MG: 5 TABLET ORAL at 12:10

## 2023-10-13 RX ADMIN — CEFAZOLIN 2 G: 330 INJECTION, POWDER, FOR SOLUTION INTRAMUSCULAR; INTRAVENOUS at 10:10

## 2023-10-13 RX ADMIN — DEXMEDETOMIDINE HCL 12 MCG: 100 INJECTION INTRAVENOUS at 10:10

## 2023-10-13 RX ADMIN — FENTANYL CITRATE 50 MCG: 50 INJECTION, SOLUTION INTRAMUSCULAR; INTRAVENOUS at 10:10

## 2023-10-13 RX ADMIN — PROPOFOL 100 MG: 10 INJECTION, EMULSION INTRAVENOUS at 10:10

## 2023-10-13 RX ADMIN — PHENYLEPHRINE HYDROCHLORIDE 100 MCG: 10 INJECTION INTRAVENOUS at 10:10

## 2023-10-13 RX ADMIN — LIDOCAINE HYDROCHLORIDE 100 MG: 20 INJECTION, SOLUTION INTRAVENOUS at 10:10

## 2023-10-13 RX ADMIN — GLYCOPYRROLATE 0.2 MG: 0.2 INJECTION, SOLUTION INTRAMUSCULAR; INTRAVITREAL at 10:10

## 2023-10-13 RX ADMIN — PHENYLEPHRINE HYDROCHLORIDE 200 MCG: 10 INJECTION INTRAVENOUS at 11:10

## 2023-10-13 RX ADMIN — SODIUM CHLORIDE, SODIUM LACTATE, POTASSIUM CHLORIDE, AND CALCIUM CHLORIDE: .6; .31; .03; .02 INJECTION, SOLUTION INTRAVENOUS at 10:10

## 2023-10-13 RX ADMIN — ONDANSETRON 4 MG: 2 INJECTION, SOLUTION INTRAMUSCULAR; INTRAVENOUS at 10:10

## 2023-10-13 RX ADMIN — DEXAMETHASONE SODIUM PHOSPHATE 4 MG: 4 INJECTION, SOLUTION INTRA-ARTICULAR; INTRALESIONAL; INTRAMUSCULAR; INTRAVENOUS; SOFT TISSUE at 10:10

## 2023-10-13 NOTE — PLAN OF CARE
POC reviewed with pt and pt's family. Pt and pt's family verbalize understanding. Safety precautions maintained. Call bell in reach.  Bed locked and in lowest position. Instructed pt to call for assistance. Pt verbalizes understanding.

## 2023-10-13 NOTE — ANESTHESIA PROCEDURE NOTES
Intubation    Date/Time: 10/13/2023 10:37 AM    Performed by: Jerrod Kidd CRNA  Authorized by: Jae Matamoros MD    Intubation:     Induction:  Intravenous    Intubated:  Postinduction    Mask Ventilation:  N/a    Attempts:  1    Attempted By:  Student    Difficult Airway Encountered?: No      Complications:  None    Airway Device:  Supraglottic airway/LMA    Airway Device Size:  4.0    Style/Cuff Inflation:  Other (see comments) (igel)    Placement Verified By:  Capnometry    Complicating Factors:  None    Findings Post-Intubation:  BS equal bilateral and atraumatic/condition of teeth unchanged

## 2023-10-13 NOTE — PLAN OF CARE
Meets OPS discharge criteria, VSS. Denies pain, both ankle dressings CDI. Tolerating clear liquids

## 2023-10-13 NOTE — TRANSFER OF CARE
"Anesthesia Transfer of Care Note    Patient: Azucena Morrison    Procedure(s) Performed: Procedure(s) (LRB):  EXCISION, MASS ruthy ankle Malleolus (Bilateral)    Patient location: PACU    Anesthesia Type: general    Transport from OR: Transported from OR on 6-10 L/min O2 by face mask with adequate spontaneous ventilation    Post pain: adequate analgesia    Post assessment: no apparent anesthetic complications and tolerated procedure well    Post vital signs: stable    Level of consciousness: awake and responds to stimulation    Nausea/Vomiting: no nausea/vomiting    Complications: none    Transfer of care protocol was followed      Last vitals: Visit Vitals  /80   Pulse 82   Temp 36.6 °C (97.9 °F) (Skin)   Resp 17   Ht 5' 2" (1.575 m)   Wt 75.8 kg (167 lb)   LMP  (LMP Unknown)   SpO2 100%   Breastfeeding No   BMI 30.54 kg/m²     "

## 2023-10-13 NOTE — ANESTHESIA PREPROCEDURE EVALUATION
10/13/2023  Azucena Morrison is a 73 y.o., female.      Pre-op Assessment     I have reviewed the Nursing Notes.    I have reviewed the Medications.     Review of Systems  Anesthesia Hx:  No problems with previous Anesthesia             Denies Family Hx of Anesthesia complications.     Social:  Non-Smoker, No Alcohol Use       Hematology/Oncology:  Hematology Normal   Oncology Normal                                   EENT/Dental:  EENT/Dental Normal           Cardiovascular:  Exercise tolerance: good   Hypertension            GRACE         Shortness of Breath Dyspnea On Extertion                   Hypertension         Pulmonary:      Shortness of breath                  Renal/:  Chronic Renal Disease        Kidney Function/Disease             Hepatic/GI:  Hepatic/GI Normal                 Musculoskeletal:  Musculoskeletal Normal                Neurological:    Neuromuscular Disease,                                 Neuromuscular Disease   Endocrine:  Diabetes    Diabetes                          Physical Exam  General: Well nourished    Airway:  Mallampati: II / II  Mouth Opening: Normal  TM Distance: Normal  Tongue: Normal  Neck ROM: Normal ROM    Dental:  Intact        Anesthesia Plan  Type of Anesthesia, risks & benefits discussed:    Anesthesia Type: Gen ETT  Intra-op Monitoring Plan: Standard ASA Monitors  Post Op Pain Control Plan: multimodal analgesia  Induction:  IV  Airway Plan: Direct, Post-Induction  Informed Consent: Informed consent signed with the Patient and all parties understand the risks and agree with anesthesia plan.  All questions answered.   ASA Score: 2    Ready For Surgery From Anesthesia Perspective.     .

## 2023-10-13 NOTE — OP NOTE
[facility] - Surgery (Hospital)  Operative Note      Date of Procedure: 10/13/2023     Procedure: Procedure(s) (LRB):  EXCISION, MASS ruthy ankle Malleolus (Bilateral)   4 x 5 cm , lipoma bilateral  Surgeon(s) and Role:     * Ulisses Horton MD - Primary    Assisting Surgeon: None    Pre-Operative Diagnosis: Essential hypertension [I10]  Stage 3a chronic kidney disease [N18.31]  Mass of both ankles [R22.43]  Type 2 diabetes mellitus without complication, with long-term current use of insulin [E11.9, Z79.4]  Ventral hernia without obstruction or gangrene [K43.9]    Post-Operative Diagnosis: Post-Op Diagnosis Codes:     * Essential hypertension [I10]     * Stage 3a chronic kidney disease [N18.31]     * Mass of both ankles [R22.43]     * Type 2 diabetes mellitus without complication, with long-term current use of insulin [E11.9, Z79.4]     * Ventral hernia without obstruction or gangrene [K43.9]    Anesthesia: General    Operative Findings (including complications, if any):  Excision bilateral ankle masses lateral malleolus lipoma 4 x 5 cm done under general anesthesia patient tolerated well no intraop complication patient sent to recovery room in stable condition.    Description of Technical Procedures:  After satisfactory anesthesia patient in supine position both feet leg were prepped and draped in a sterile manner using Betadine scrub solution stockinette stockinette was applied area the masses both lateral malleolus was infiltrated using 1% xylocaine solution incision was made in both areas taken down deep subcu tissue subcutaneous bleeders clamped bovied for additional down when encapsulated lipomas of both lateral malleolus was performed into the deep subcutaneous tissue subcutaneous bleeders were clamped bovied hemostasis factory maintained wound was thoroughly irrigated antibiotic solution hemostasis was maintained wound was then closed using interrupted 3-0 Vicryl subcutaneous tissue skin was closed running  "4-0 nylon suture Xeroform 4 x 4 Kerlix and Ace bandage was applied instrument counts sponge count counts correct.  Tolerated well no intraop complication estimated blood loss 40 cc specimen removed lipoma was both ankle patient sent to recovery room in stable condition.    Significant Surgical Tasks Conducted by the Assistant(s), if Applicable: na    Estimated Blood Loss (EBL): 40 cc         Implants: * No implants in log *    Specimens:   Specimen (24h ago, onward)       Start     Ordered    10/13/23 1116  Specimen to Pathology, Surgery General Surgery  Once        Comments: Pre-op Diagnosis: Essential hypertension [I10]Stage 3a chronic kidney disease [N18.31]Mass of both ankles [R22.43]Type 2 diabetes mellitus without complication, with long-term current use of insulin [E11.9, Z79.4]Ventral hernia without obstruction or gangrene [K43.9]Procedure(s):EXCISION, MASS ruthy ankle Malleolus Number of specimens: 2Name of specimens: 1. Mass right ankle-perm.    2. Mass left ankle-perm.     References:    Click here for ordering Quick Tip   Question Answer Comment   Procedure Type: General Surgery    Specimen Class: Routine/Screening    Which provider would you like to cc? FORD DAMIAN    Release to patient Immediate        10/13/23 1117                            Condition: Good    Disposition: PACU - hemodynamically stable.    Attestation: I performed the procedure.    Discharge Note    OUTCOME: Patient tolerated treatment/procedure well without complication and is now ready for discharge.    DISPOSITION: Home or Self Care    FINAL DIAGNOSIS:  <principal problem not specified>    FOLLOWUP: In clinic 7 days    DISCHARGE INSTRUCTIONS:    Discharge Procedure Orders   Surgical Boot to Right Foot     Order Specific Question Answer Comments   Height: 5' 2" (1.575 m)    Weight: 75.8 kg (167 lb)    Quantity: 1    Size: medium    Length of need (1-99 months): 3      Surgical Boot to Left Foot     Order Specific Question " "Answer Comments   Height: 5' 2" (1.575 m)    Weight: 75.8 kg (167 lb)    Quantity: 1    Size: medium    Length of need (1-99 months): 3      Diet general     Keep surgical extremity elevated     Lifting restrictions     Leave dressing on - Keep it clean, dry, and intact until clinic visit     Call MD for:  temperature >100.4     Call MD for:  persistent nausea and vomiting     Call MD for:  severe uncontrolled pain     Call MD for:  redness, tenderness, or signs of infection (pain, swelling, redness, odor or green/yellow discharge around incision site)       "

## 2023-10-13 NOTE — H&P
History & Physical    SUBJECTIVE:     History of Present Illness:  Patient is a 73 y.o. female presents with  bilateral ankle mass and abdominal wall hernia     No chief complaint on file.      Review of patient's allergies indicates:   Allergen Reactions    Chlorhexidine gluconate Rash     Chlorhexidine/alcohol wipes. Rash and blistering.       No current facility-administered medications for this encounter.       Past Medical History:   Diagnosis Date    Chronic kidney disease, stage 3     Diabetes mellitus     Diabetes mellitus, type 2     Hematuria, unspecified     Hypercalcemia     Hypertension     Malnutrition of moderate degree 2020    Mixed hyperlipidemia 2022    Proteinuria     Renal cyst, left     UTI (urinary tract infection) 2020     Past Surgical History:   Procedure Laterality Date    ABDOMINAL SURGERY      CATHETERIZATION OF URETER Bilateral 2020    Procedure: CATHETERIZATION, URETER;  Surgeon: Kvng Cunningham MD;  Location: [address];  Service: Urology;  Laterality: Bilateral;     SECTION, CLASSIC      x2    CHOLECYSTECTOMY      CLOSURE, ILEOSTOMY, OPEN N/A 2021    Procedure: CLOSURE, ILEOSTOMY, OPEN, ERAS low;  Surgeon: Fabiana Valiente MD;  Location: [address];  Service: Colon and Rectal;  Laterality: N/A;    COLON SURGERY      [facility] [place]    COLONOSCOPY N/A 2018    Procedure: COLONOSCOPY;  Surgeon: Gibran Aguayo MD;  Location: [facility];  Service: Endoscopy;  Laterality: N/A;    COLOSTOMY Left     CYSTOSCOPY N/A 2020    Procedure: CYSTOSCOPY;  Surgeon: Kvng Cunningham MD;  Location: [address];  Service: Urology;  Laterality: N/A;    CYSTOSCOPY WITH URETEROSCOPY, RETROGRADE PYELOGRAPHY, AND INSERTION OF STENT Left 2019    Procedure: CYSTOSCOPY, WITH RETROGRADE PYELOGRAM AND URETERAL STENT INSERTION with placement of a muir cath;  Surgeon: Ulisses Horton MD;  Location: [facility];  Service: General;   Laterality: Left;    FLEXIBLE SIGMOIDOSCOPY N/A 2/9/2021    Procedure: SIGMOIDOSCOPY, FLEXIBLE;  Surgeon: Fabiana Valiente MD;  Location: [facility] OR 2ND FLR;  Service: Colon and Rectal;  Laterality: N/A;    HYSTEROSCOPY WITH DILATION AND CURETTAGE OF UTERUS N/A 12/9/2020    Procedure: HYSTEROSCOPY, WITH DILATION AND CURETTAGE OF UTERUS (MYOSURE);  Surgeon: Caitlyn Feng MD;  Location: [facility] OR;  Service: OB/GYN;  Laterality: N/A;  Guido notified 11/30,   video    HYSTEROSCOPY WITH DILATION AND CURETTAGE OF UTERUS N/A 3/4/2022    Procedure: HYSTEROSCOPY, WITH DILATION AND CURETTAGE OF UTERUS;  Surgeon: Caitlyn Feng MD;  Location: [facility] OR;  Service: OB/GYN;  Laterality: N/A;  Alexander notified CW 3/3  video confirmed    ILEOSTOMY  7/31/2020    Procedure: CREATION, ILEOSTOMY;  Surgeon: Fabiana Valiente MD;  [facility];  Service: Colon and Rectal;;    INCISION AND DRAINAGE OF ABSCESS N/A 12/22/2019    Procedure: INCISION AND DRAINAGE, ABSCESS;  Surgeon: Ulisses Horton MD;  Location: [facility] OR;  Service: General;  Laterality: N/A;    INCISION OF ABDOMINAL WALL N/A 8/10/2018    Procedure: INCISION, ABDOMINAL WALL;  Surgeon: Ulisses Horton MD;  Location: [facility] OR;  Service: General;  Laterality: N/A;    INCISIONAL HERNIA REPAIR Right 2010    LOW ANTERIOR RESECTION OF COLON N/A 7/31/2020    Procedure: RESECTION, COLON, LOW ANTERIOR;  Surgeon: Fabiana Valiente MD;  Location: [facility] OR 2ND FLR;  Service: Colon and Rectal;  Laterality: N/A;    LYSIS OF ADHESIONS  7/31/2020    Procedure: LYSIS, ADHESIONS;  Surgeon: Fabiana Valiente MD;  Location: [facility] OR 2ND FLR;  Service: Colon and Rectal;;     Family History   Problem Relation Age of Onset    Stroke Mother     Heart disease Mother     Hyperlipidemia Mother     Hypertension Mother     Alcohol abuse Father     Stroke Sister     Diabetes Sister     Heart disease Sister     Hyperlipidemia Sister     Hypertension Sister     Diabetes Brother     Early  death Grandchild     Anesthesia problems Neg Hx     Broken bones Neg Hx     Cancer Neg Hx      Social History     Tobacco Use    Smoking status: Former     Current packs/day: 0.00     Types: Cigarettes     Quit date: 2000     Years since quittin.7    Smokeless tobacco: Never   Substance Use Topics    Alcohol use: No    Drug use: No        Review of Systems:    Review of Systems   Constitutional: Negative.    HENT: Negative.     Eyes: Negative.    Respiratory: Negative.     Cardiovascular: Negative.    Gastrointestinal: Negative.    Genitourinary: Negative.    Musculoskeletal: Negative.    Skin: Negative.    Neurological: Negative.    Psychiatric/Behavioral: Negative.         OBJECTIVE:     Vital Signs (Most Recent)              Physical Exam:    Physical Exam  Constitutional:       Appearance: She is well-developed.   HENT:      Head: Normocephalic.   Eyes:      Conjunctiva/sclera: Conjunctivae normal.      Pupils: Pupils are equal, round, and reactive to light.   Cardiovascular:      Rate and Rhythm: Normal rate and regular rhythm.      Heart sounds: Normal heart sounds.   Pulmonary:      Effort: Pulmonary effort is normal.      Breath sounds: Normal breath sounds.   Abdominal:      General: Bowel sounds are normal.      Palpations: Abdomen is soft.   Musculoskeletal:         General: Normal range of motion.      Cervical back: Normal range of motion and neck supple.   Skin:     General: Skin is warm and dry.   Neurological:      Mental Status: She is alert and oriented to person, place, and time.      Deep Tendon Reflexes: Reflexes are normal and symmetric.       Laboratory      Diagnostic Results:      ASSESSMENT/PLAN:     Dm  Bilateral ankle mass  Ventral hernia     PLAN:Plan   Excision today.

## 2023-10-16 NOTE — ANESTHESIA POSTPROCEDURE EVALUATION
Anesthesia Post Evaluation    Patient: Azucena Morrison    Procedure(s) Performed: Procedure(s) (LRB):  EXCISION, MASS ruthy ankle Malleolus (Bilateral)    Final Anesthesia Type: general      Patient location during evaluation: PACU  Patient participation: Yes- Able to Participate  Level of consciousness: awake and alert  Post-procedure vital signs: reviewed and stable  Pain management: adequate  Airway patency: patent    PONV status at discharge: No PONV  Anesthetic complications: no      Cardiovascular status: blood pressure returned to baseline and hemodynamically stable  Respiratory status: unassisted  Hydration status: euvolemic  Follow-up not needed.          Vitals Value Taken Time   /74 10/13/23 1250   Temp 36.4 °C (97.6 °F) 10/13/23 1250   Pulse 70 10/13/23 1250   Resp 18 10/13/23 1250   SpO2 99 % 10/13/23 1250         Event Time   Out of Recovery 12:13:45         Pain/Patience Score: No data recorded

## 2023-10-18 LAB
FINAL PATHOLOGIC DIAGNOSIS: NORMAL
GROSS: NORMAL
Lab: NORMAL

## 2023-11-07 ENCOUNTER — TELEPHONE (OUTPATIENT)
Dept: OBSTETRICS AND GYNECOLOGY | Facility: CLINIC | Age: 73
End: 2023-11-07
Payer: MEDICARE

## 2023-11-07 NOTE — TELEPHONE ENCOUNTER
----- Message from Kevin Musa sent at 11/2/2023 12:20 PM CDT -----  Type:  Mammogram    Caller is requesting to schedule their annual mammogram appointment.  Order is not listed in EPIC.  Please enter order and contact patient to schedule.  Name of Caller:daughter in law  Where would they like the mammogram performed?[facility]  Would the patient rather a call back or a response via [facility]? Call   Best Call Back Number:[phone] (pt speaks only English)  Additional Information:

## 2023-12-20 ENCOUNTER — HOSPITAL ENCOUNTER (OUTPATIENT)
Facility: HOSPITAL | Age: 73
Discharge: HOME | End: 2023-12-21
Attending: EMERGENCY MEDICINE | Admitting: INTERNAL MEDICINE
Payer: MEDICARE

## 2023-12-20 DIAGNOSIS — R31.9 HEMATURIA, UNSPECIFIED TYPE: ICD-10-CM

## 2023-12-20 DIAGNOSIS — N17.9 ACUTE KIDNEY INJURY: ICD-10-CM

## 2023-12-20 DIAGNOSIS — N30.01 ACUTE CYSTITIS WITH HEMATURIA: ICD-10-CM

## 2023-12-20 DIAGNOSIS — N17.9 AKI (ACUTE KIDNEY INJURY): Primary | ICD-10-CM

## 2023-12-20 PROBLEM — R10.9 ACUTE FLANK PAIN: Status: ACTIVE | Noted: 2023-12-20

## 2023-12-20 LAB
ALBUMIN SERPL BCP-MCNC: 3.5 G/DL (ref 3.5–5.2)
ALP SERPL-CCNC: 68 U/L (ref 55–135)
ALT SERPL W/O P-5'-P-CCNC: 21 U/L (ref 10–44)
ANION GAP SERPL CALC-SCNC: 7 MMOL/L (ref 8–16)
AST SERPL-CCNC: 27 U/L (ref 10–40)
BACTERIA #/AREA URNS HPF: ABNORMAL /HPF
BASOPHILS # BLD AUTO: 0.03 K/UL (ref 0–0.2)
BASOPHILS NFR BLD: 0.3 % (ref 0–1.9)
BILIRUB SERPL-MCNC: 0.5 MG/DL (ref 0.1–1)
BILIRUB UR QL STRIP: NEGATIVE
BUN SERPL-MCNC: 33 MG/DL (ref 8–23)
CALCIUM SERPL-MCNC: 9.2 MG/DL (ref 8.7–10.5)
CHLORIDE SERPL-SCNC: 100 MMOL/L (ref 95–110)
CLARITY UR: ABNORMAL
CO2 SERPL-SCNC: 28 MMOL/L (ref 23–29)
COLOR UR: ABNORMAL
CREAT SERPL-MCNC: 2 MG/DL (ref 0.5–1.4)
CREAT UR-MCNC: 121.4 MG/DL (ref 15–325)
DIFFERENTIAL METHOD: ABNORMAL
EOSINOPHIL # BLD AUTO: 0 K/UL (ref 0–0.5)
EOSINOPHIL NFR BLD: 0.3 % (ref 0–8)
ERYTHROCYTE [DISTWIDTH] IN BLOOD BY AUTOMATED COUNT: 13.4 % (ref 11.5–14.5)
EST. GFR  (NO RACE VARIABLE): 26 ML/MIN/1.73 M^2
ESTIMATED AVG GLUCOSE: 146 MG/DL (ref 68–131)
GLUCOSE SERPL-MCNC: 144 MG/DL (ref 70–110)
GLUCOSE UR QL STRIP: ABNORMAL
HBA1C MFR BLD: 6.7 % (ref 4–5.6)
HCT VFR BLD AUTO: 43 % (ref 37–48.5)
HGB BLD-MCNC: 14.6 G/DL (ref 12–16)
HGB UR QL STRIP: ABNORMAL
HYALINE CASTS #/AREA URNS LPF: 0 /LPF
IMM GRANULOCYTES # BLD AUTO: 0.03 K/UL (ref 0–0.04)
IMM GRANULOCYTES NFR BLD AUTO: 0.3 % (ref 0–0.5)
KETONES UR QL STRIP: NEGATIVE
LEUKOCYTE ESTERASE UR QL STRIP: ABNORMAL
LYMPHOCYTES # BLD AUTO: 1.4 K/UL (ref 1–4.8)
LYMPHOCYTES NFR BLD: 13.4 % (ref 18–48)
MCH RBC QN AUTO: 29.1 PG (ref 27–31)
MCHC RBC AUTO-ENTMCNC: 34 G/DL (ref 32–36)
MCV RBC AUTO: 86 FL (ref 82–98)
MICROSCOPIC COMMENT: ABNORMAL
MONOCYTES # BLD AUTO: 0.6 K/UL (ref 0.3–1)
MONOCYTES NFR BLD: 5.3 % (ref 4–15)
NEUTROPHILS # BLD AUTO: 8.7 K/UL (ref 1.8–7.7)
NEUTROPHILS NFR BLD: 80.4 % (ref 38–73)
NITRITE UR QL STRIP: NEGATIVE
NRBC BLD-RTO: 0 /100 WBC
PH UR STRIP: 6 [PH] (ref 5–8)
PLATELET # BLD AUTO: 326 K/UL (ref 150–450)
PMV BLD AUTO: 9 FL (ref 9.2–12.9)
POCT GLUCOSE: 127 MG/DL (ref 70–110)
POTASSIUM SERPL-SCNC: 4.1 MMOL/L (ref 3.5–5.1)
PROT SERPL-MCNC: 8 G/DL (ref 6–8.4)
PROT UR QL STRIP: ABNORMAL
RBC # BLD AUTO: 5.01 M/UL (ref 4–5.4)
RBC #/AREA URNS HPF: >100 /HPF (ref 0–4)
SODIUM SERPL-SCNC: 135 MMOL/L (ref 136–145)
SODIUM UR-SCNC: 43 MMOL/L (ref 20–250)
SP GR UR STRIP: 1.02 (ref 1–1.03)
URN SPEC COLLECT METH UR: ABNORMAL
UROBILINOGEN UR STRIP-ACNC: NEGATIVE EU/DL
WBC # BLD AUTO: 10.76 K/UL (ref 3.9–12.7)
WBC #/AREA URNS HPF: 50 /HPF (ref 0–5)

## 2023-12-20 PROCEDURE — 99285 EMERGENCY DEPT VISIT HI MDM: CPT | Mod: 25

## 2023-12-20 PROCEDURE — 96365 THER/PROPH/DIAG IV INF INIT: CPT

## 2023-12-20 PROCEDURE — 80053 COMPREHEN METABOLIC PANEL: CPT | Performed by: PHYSICIAN ASSISTANT

## 2023-12-20 PROCEDURE — 63600175 PHARM REV CODE 636 W HCPCS: Performed by: PHYSICIAN ASSISTANT

## 2023-12-20 PROCEDURE — 82962 GLUCOSE BLOOD TEST: CPT

## 2023-12-20 PROCEDURE — 83036 HEMOGLOBIN GLYCOSYLATED A1C: CPT

## 2023-12-20 PROCEDURE — 84300 ASSAY OF URINE SODIUM: CPT

## 2023-12-20 PROCEDURE — 85025 COMPLETE CBC W/AUTO DIFF WBC: CPT | Performed by: PHYSICIAN ASSISTANT

## 2023-12-20 PROCEDURE — 81000 URINALYSIS NONAUTO W/SCOPE: CPT | Performed by: PHYSICIAN ASSISTANT

## 2023-12-20 PROCEDURE — 87077 CULTURE AEROBIC IDENTIFY: CPT | Performed by: PHYSICIAN ASSISTANT

## 2023-12-20 PROCEDURE — 82570 ASSAY OF URINE CREATININE: CPT

## 2023-12-20 PROCEDURE — 87086 URINE CULTURE/COLONY COUNT: CPT | Performed by: PHYSICIAN ASSISTANT

## 2023-12-20 PROCEDURE — 87088 URINE BACTERIA CULTURE: CPT | Performed by: PHYSICIAN ASSISTANT

## 2023-12-20 PROCEDURE — 87186 SC STD MICRODIL/AGAR DIL: CPT | Performed by: PHYSICIAN ASSISTANT

## 2023-12-20 PROCEDURE — 96375 TX/PRO/DX INJ NEW DRUG ADDON: CPT

## 2023-12-20 PROCEDURE — G0378 HOSPITAL OBSERVATION PER HR: HCPCS

## 2023-12-20 PROCEDURE — 25000003 PHARM REV CODE 250: Performed by: PHYSICIAN ASSISTANT

## 2023-12-20 PROCEDURE — 25000003 PHARM REV CODE 250

## 2023-12-20 RX ORDER — INSULIN ASPART 100 [IU]/ML
0-5 INJECTION, SOLUTION INTRAVENOUS; SUBCUTANEOUS
Status: DISCONTINUED | OUTPATIENT
Start: 2023-12-20 | End: 2023-12-21 | Stop reason: HOSPADM

## 2023-12-20 RX ORDER — METFORMIN HYDROCHLORIDE 1000 MG/1
1000 TABLET ORAL 2 TIMES DAILY
Status: ON HOLD | COMMUNITY
Start: 2023-12-18 | End: 2023-12-21

## 2023-12-20 RX ORDER — ACETAMINOPHEN 500 MG
1000 TABLET ORAL EVERY 8 HOURS PRN
Status: DISCONTINUED | OUTPATIENT
Start: 2023-12-20 | End: 2023-12-21 | Stop reason: HOSPADM

## 2023-12-20 RX ORDER — SODIUM CHLORIDE 9 MG/ML
INJECTION, SOLUTION INTRAVENOUS CONTINUOUS
Status: ACTIVE | OUTPATIENT
Start: 2023-12-20 | End: 2023-12-20

## 2023-12-20 RX ORDER — SODIUM CHLORIDE 0.9 % (FLUSH) 0.9 %
10 SYRINGE (ML) INJECTION
Status: DISCONTINUED | OUTPATIENT
Start: 2023-12-20 | End: 2023-12-21 | Stop reason: HOSPADM

## 2023-12-20 RX ORDER — SODIUM CHLORIDE 9 MG/ML
INJECTION, SOLUTION INTRAVENOUS CONTINUOUS
Status: DISCONTINUED | OUTPATIENT
Start: 2023-12-20 | End: 2023-12-20

## 2023-12-20 RX ORDER — MAG HYDROX/ALUMINUM HYD/SIMETH 200-200-20
30 SUSPENSION, ORAL (FINAL DOSE FORM) ORAL
Status: DISCONTINUED | OUTPATIENT
Start: 2023-12-20 | End: 2023-12-20

## 2023-12-20 RX ORDER — GLUCAGON 1 MG
1 KIT INJECTION
Status: DISCONTINUED | OUTPATIENT
Start: 2023-12-20 | End: 2023-12-21 | Stop reason: HOSPADM

## 2023-12-20 RX ORDER — ENOXAPARIN SODIUM 100 MG/ML
30 INJECTION SUBCUTANEOUS EVERY 24 HOURS
Status: DISCONTINUED | OUTPATIENT
Start: 2023-12-20 | End: 2023-12-20

## 2023-12-20 RX ORDER — ONDANSETRON 2 MG/ML
4 INJECTION INTRAMUSCULAR; INTRAVENOUS
Status: COMPLETED | OUTPATIENT
Start: 2023-12-20 | End: 2023-12-20

## 2023-12-20 RX ORDER — OLMESARTAN MEDOXOMIL 40 MG/1
40 TABLET ORAL DAILY
COMMUNITY

## 2023-12-20 RX ORDER — PRAVASTATIN SODIUM 40 MG/1
40 TABLET ORAL NIGHTLY
Status: DISCONTINUED | OUTPATIENT
Start: 2023-12-20 | End: 2023-12-21 | Stop reason: HOSPADM

## 2023-12-20 RX ORDER — ONDANSETRON 2 MG/ML
4 INJECTION INTRAMUSCULAR; INTRAVENOUS EVERY 8 HOURS PRN
Status: DISCONTINUED | OUTPATIENT
Start: 2023-12-20 | End: 2023-12-21 | Stop reason: HOSPADM

## 2023-12-20 RX ORDER — IBUPROFEN 200 MG
16 TABLET ORAL
Status: DISCONTINUED | OUTPATIENT
Start: 2023-12-20 | End: 2023-12-21 | Stop reason: HOSPADM

## 2023-12-20 RX ORDER — IBUPROFEN 200 MG
24 TABLET ORAL
Status: DISCONTINUED | OUTPATIENT
Start: 2023-12-20 | End: 2023-12-21 | Stop reason: HOSPADM

## 2023-12-20 RX ORDER — SUCRALFATE 1 G/10ML
1 SUSPENSION ORAL EVERY 6 HOURS
Status: DISCONTINUED | OUTPATIENT
Start: 2023-12-20 | End: 2023-12-20

## 2023-12-20 RX ADMIN — ONDANSETRON 4 MG: 2 INJECTION INTRAMUSCULAR; INTRAVENOUS at 11:12

## 2023-12-20 RX ADMIN — SODIUM CHLORIDE 500 ML: 9 INJECTION, SOLUTION INTRAVENOUS at 11:12

## 2023-12-20 RX ADMIN — PRAVASTATIN SODIUM 40 MG: 40 TABLET ORAL at 08:12

## 2023-12-20 RX ADMIN — CEFTRIAXONE SODIUM 1 G: 1 INJECTION, POWDER, FOR SOLUTION INTRAMUSCULAR; INTRAVENOUS at 11:12

## 2023-12-20 NOTE — Clinical Note
Diagnosis: URI (acute kidney injury) [941930]   Future Attending Provider: SIMBA APODACA [[id number]]   Is the patient being sent to ED Observation?: No   Admitting Provider:: SIMBA APODACA [[id number]]

## 2023-12-20 NOTE — PROGRESS NOTES
[facility]           Pharmacy        Current Drug Shortage     Due to national backorder and [facility] is critically low on inventory of Dextrose 50% (D50) Syringes and Vials, pharmacy has automatically switched from D50% to D10% IVPB at the equivalent dose until resolution of the shortage per P&T approved protocol.               Rubi Urbina, PharmD  [phone]

## 2023-12-20 NOTE — H&P
"[facility] Medicine H&P Note     Admitting Team: [facility] Hospitalist Team B  Attending Physician: Chencho Khoury MD  Resident: Dr. Tracy  Intern: Dr. Samuels    Date of Admit: 2023    Chief Complaint     L flank pain, bloody urine and pain with urination x 3days     Subjective:      History of Present Illness:  Azucena Morrison is a 73 y.o. Kazakh speaking female with past medical history of HTN, CKD3a, T2DM (A1c 7.3%, on insulin),  who presented on 2023 to [facility] ED for L flank pain, bloody urine and pain with urination for 3 days with nausea and vomiting.     The patient was in their usual state of health which includes independent of all ADLs until 3 days ago when her intermittent L sided flank pain began with nausea, vomiting, fatigue, and poor appetite. She then noticed bloody urine with "clots" and dysuria. Took tylenol at home with some relief. Reports drinking lots of water. Since then her urine hasn't been as dark and her symptoms have improved. However now she has R low back pain. Denies fever/chills, abdominal pain and any other complaints at this time.     Upon arrival to the ED, AFVSS on RA. Labs remarkable for: BUN 33, Cr 2,  UA >100 RBCs, 50 WBC, 1+ protein. Given dose of rocephin, NS 500ml, zofran 4 IV 1x.     Telephone  was used throughout patient encounter.    Review of Systems:  A comprehensive review of systems was negative unless otherwise stated in the HPI.     Past Medical History:   SBO  CKD3a  T2DM  HTN  HLD  L Renal cyst  UTI w/ E.Coli  Chronic low back pain    Past Surgical History: Reviewed  Past Surgical History:   Procedure Laterality Date    ABDOMINAL SURGERY      CATHETERIZATION OF URETER Bilateral 2020    Procedure: CATHETERIZATION, URETER;  Surgeon: Kvng Cunningham MD;  Location: [facility] OR [address] [place] [address];  Service: Urology;  Laterality: Bilateral;     SECTION, CLASSIC      x2    CHOLECYSTECTOMY      CLOSURE, ILEOSTOMY, OPEN N/A 2021    " Procedure: CLOSURE, ILEOSTOMY, OPEN, ERAS low;  Surgeon: Fabiana Valiente MD;  Location: [facility] OR [facility] FLR;  Service: Colon and Rectal;  Laterality: N/A;    COLON SURGERY  2015    [facility]    COLONOSCOPY N/A 6/4/2018    Procedure: COLONOSCOPY;  Surgeon: Gibran Aguayo MD;  Location: [facility];  Service: Endoscopy;  Laterality: N/A;    COLOSTOMY Left 2015    CYSTOSCOPY N/A 7/31/2020    Procedure: CYSTOSCOPY;  Surgeon: Kvng Cunningham MD;  [facility];  Service: Urology;  Laterality: N/A;    CYSTOSCOPY WITH URETEROSCOPY, RETROGRADE PYELOGRAPHY, AND INSERTION OF STENT Left 2/22/2019    Procedure: CYSTOSCOPY, WITH RETROGRADE PYELOGRAM AND URETERAL STENT INSERTION with placement of a muir cath;  Surgeon: Ulisses Horton MD;  Location: [facility];  Service: General;  Laterality: Left;    FLEXIBLE SIGMOIDOSCOPY N/A 2/9/2021    Procedure: SIGMOIDOSCOPY, FLEXIBLE;  Surgeon: Fabiana Valiente MD;  [facility];  Service: Colon and Rectal;  Laterality: N/A;    HYSTEROSCOPY WITH DILATION AND CURETTAGE OF UTERUS N/A 12/9/2020    Procedure: HYSTEROSCOPY, WITH DILATION AND CURETTAGE OF UTERUS (MYOSURE);  Surgeon: Caitlyn Feng MD;  Location: [facility];  Service: OB/GYN;  Laterality: N/A;  Guido notified 11/30, EF  video    HYSTEROSCOPY WITH DILATION AND CURETTAGE OF UTERUS N/A 3/4/2022    Procedure: HYSTEROSCOPY, WITH DILATION AND CURETTAGE OF UTERUS;  Surgeon: Caitlyn Feng MD;  Location: [facility] OR;  Service: OB/GYN;  Laterality: N/A;  Alexander notified CW 3/3  video confirmed    ILEOSTOMY  7/31/2020    Procedure: CREATION, ILEOSTOMY;  Surgeon: Fabiana Valiente MD;  [facility];  Service: Colon and Rectal;;    INCISION AND DRAINAGE OF ABSCESS N/A 12/22/2019    Procedure: INCISION AND DRAINAGE, ABSCESS;  Surgeon: Ulisses Horton MD;  Location: [facility] OR;  Service: General;  Laterality: N/A;    INCISION OF ABDOMINAL WALL N/A 8/10/2018    Procedure: INCISION, ABDOMINAL WALL;  Surgeon:  Ulisses Horton MD;  Location: [facility] OR;  Service: General;  Laterality: N/A;    INCISIONAL HERNIA REPAIR Right 2010    LOW ANTERIOR RESECTION OF COLON N/A 7/31/2020    Procedure: RESECTION, COLON, LOW ANTERIOR;  Surgeon: Fabiana Valiente MD;  Location: [facility] OR 2ND FLR;  Service: Colon and Rectal;  Laterality: N/A;    LYSIS OF ADHESIONS  7/31/2020    Procedure: LYSIS, ADHESIONS;  Surgeon: Fabiana Valiente MD;  Location: [facility] OR 2ND FLR;  Service: Colon and Rectal;;    SURGICAL REMOVAL OF MASS OF LOWER EXTREMITY Bilateral 10/13/2023    Procedure: EXCISION, MASS ruthy ankle Malleolus;  Surgeon: Ulisses Horton MD;  Location: [facility] OR;  Service: General;  Laterality: Bilateral;       Allergies:   Chlorhexidine - rash    Home Medications:   Medications reconciled with patient and pharmacy.   Lantus 12U qd  Glimepiride qd  Metformin 1000 BID  Monjauro once/week  Jardiance 25 qd  HCTZ 25 qd  Coreg 80 mg qd  Pravastatin 40 qd  Protonix 40 mg qd  Finerenone 10 qd  Olmesartan 40 mg qd  Bentyl prn  Vesicare 10 mg qd    Family History: Reviewed  Family History   Problem Relation Age of Onset    Stroke Mother     Heart disease Mother     Hyperlipidemia Mother     Hypertension Mother     Alcohol abuse Father     Stroke Sister     Diabetes Sister     Heart disease Sister     Hyperlipidemia Sister     Hypertension Sister     Diabetes Brother     Early death Grandchild     Anesthesia problems Neg Hx     Broken bones Neg Hx     Cancer Neg Hx        Social History: Reviewed  Alcohol use: denies  Tobacco use: former, quit in 2000  Illicits: denies  Current living situation: at home    Healthcare Maintaince :   Primary Care Physician:  Pj Sanches MD     Immunizations:   Currently on File:   Most Recent Immunizations   Administered Date(s) Administered    COVID-19, MRNA, LN-S, PF (MODERNA FULL 0.5 ML DOSE) 08/31/2022    COVID-19, mRNA, LNP-S, PF (Moderna 2023)Ages 12+ 12/02/2023    COVID-19, vector-nr, rS-Ad26, PF  "(Manuela) 03/05/2021    Hepatitis A, Adult 09/12/2005    Hepatitis B, Adult 09/12/2005    Influenza - High Dose - PF (65 years and older) 01/21/2020    Influenza A (H1N1) 2009 Monovalent - IM 11/03/2009    Pneumococcal Conjugate - 13 Valent 08/06/2016    Pneumococcal Polysaccharide - 23 Valent 08/04/2015    Td (ADULT) 09/12/2005     Influenza:   NUTD  COVID-19:   UTD  Pneumonia:   UTD  TDap:    UTD  Shingles:   NUTD    Cancer Screening:  Colonoscopy:   2018, flex sig 2021  PAP:    10/2020  Mammogram:   2/2023     Objective     Vital signs reviewed.  Triage: BP: 115/70  Pulse: 68  Temp: 97.9 °F (36.6 °C)  Resp: 18  Height: 5' 2" (157.5 cm)  Weight: 75.8 kg (167 lb)  BMI (Calculated): 30.5  SpO2: 99 %  Exam: /72   Pulse 67   Temp 97.9 °F (36.6 °C)   Resp 16   Ht 5' 2" (1.575 m)   Wt 75.8 kg (167 lb)   LMP  (LMP Unknown)   SpO2 100%   BMI 30.54 kg/m²     Wt Readings from Last 1 Encounters:   12/20/23 75.8 kg (167 lb)     Body mass index is 30.54 kg/m².     Intake/Output Summary (Last 24 hours) at 12/20/2023 1609  Last data filed at 12/20/2023 1221  Gross per 24 hour   Intake 600 ml   Output --   Net 600 ml       Physical Examination:  General: alert, no acute distress, resting comfortably in bed  HEENT: atraumatic, normocephalic, MMM, conjunctiva clear  Cardiovascular: Regular rate and rhythm, normal S1 and S2, no extra heart sounds or murmurs appreciated    Back: Negative CVA tenderness. Mild lower R back pain   Respiratory: normal work of breathing, no conversational dyspnea, no accessory muscle use noted, clear to auscultation bilaterally, no wheezes or crackles   Abdominal: Non-distended, soft, normoactive bowel sounds, non-tender to palpation, no guarding; midline abdominal scar noted  Extremities: Atraumatic, no cyanosis or edema, moving all four extremities  Pulses: strong peripheral pulses  Skin: Non-diaphoretic, warm, dry. No rashes or erythema noted, no ecchymosis.  Neurologic: " AAOx3, no gross focal deficits  Psychiatric: Normal mood and affect    Laboratory:  CBC:  Lab Results   Component Value Date    WBC 10.76 12/20/2023    HGB 14.6 12/20/2023    MCV 86 12/20/2023    MCH 29.1 12/20/2023    MCHC 34.0 12/20/2023    RDW 13.4 12/20/2023     12/20/2023    MPV 9.0 (L) 12/20/2023    PLTEST Appears normal 04/03/2023     BMP:   Lab Results   Component Value Date     (L) 12/20/2023    K 4.1 12/20/2023     12/20/2023    CO2 28 12/20/2023    BUN 33 (H) 12/20/2023    CREATININE 2.0 (H) 12/20/2023     (H) 12/20/2023    CALCIUM 9.2 12/20/2023    MG 1.9 04/04/2023    PHOS 2.9 07/29/2023    PHOS 2.9 07/29/2023     LFTs:   Lab Results   Component Value Date    PROT 8.0 12/20/2023    ALBUMIN 3.5 12/20/2023    AST 27 12/20/2023    ALKPHOS 68 12/20/2023    ALT 21 12/20/2023     Coags:   Lab Results   Component Value Date    INR 1.0 07/14/2020     FLP:   Lab Results   Component Value Date    CHOL 183 02/08/2022    HDL 30 (L) 02/08/2022    LDLCALC Invalid, Trig>400.0 02/08/2022    TRIG 430 (H) 02/08/2022    CHOLHDL 16.4 (L) 02/08/2022     DM:   Lab Results   Component Value Date    HGBA1C 7.3 (H) 07/29/2023    HGBA1C 5.8 (H) 01/12/2023    HGBA1C 9.3 (H) 09/29/2022    LDLCALC Invalid, Trig>400.0 02/08/2022    CREATININE 2.0 (H) 12/20/2023     Thyroid:   Lab Results   Component Value Date    TSH 1.70 06/11/2008     Anemia:   Lab Results   Component Value Date    IRON 55 01/12/2023    TIBC 444 01/12/2023    FERRITIN 43 01/12/2023    CFDRSWFS43 250 11/25/2020    FOLATE 10.1 08/03/2018     Urine:  Lab Results   Component Value Date    LABURIN ESCHERICHIA COLI ESBL  >100,000 cfu/ml   (A) 08/05/2020    COLORU Red (A) 12/20/2023    SPECGRAV 1.025 12/20/2023    NITRITE Negative 12/20/2023    KETONESU Negative 12/20/2023    UROBILINOGEN Negative 12/20/2023     All laboratory data reviewed    Microbiology Data:   Urine clx pending    EKG (my interpretation):  none    Imaging Data:  Reviewed  12/20/23 CT Renal Stone Study ABD Pelvis WO  No evidence of nephrolithiasis or obstructive uropathy. No hydronephrosis. Simple cysts in kidneys. Extensive postoperative changes in bowel as seen previously.      Assessment/Plan     Azucena Morrison is a 73 y.o. Yoruba speaking female with past medical history of HTN, CKD3a, T2DM (A1c 7.3%, on insulin),  who presented on 12/20/2023 to [facility] ED for L flank pain, bloody urine and pain with urination for 3 days with nausea and vomiting.  Patient is admitted to [facility] for URI on CKD, acute cystitis with hematuria.     #URI on CKD Stage 3a  #Acute Cystitis with Hematuria  #Acute flank pain  - Presented with L flank pain, hematuria, dysuria. On exam, afebrile, no CVA tenderness. No leukocytosis. Cr spike 2 (Baseline Cr 1-1.3). U/A positive for pyuria, hematuria. CT AP neg for obstruction.   - URI most likely post-renal due to nephrolithiasis. Picture not worrisome for pyelo. However in setting of CKD, keep overnight to monitor renal function.  - Hematuria likely 2/2 UTI. Need to repeat U/A outpatient to ensure clearance.   - Received 1 dose rocephin and IVF in ED  PLAN:  - follow urine clx  - resume rocephin   - ordered NS 500mL   - daily CMP, phos, CBC  - urine studies   - avoid NSAIDs and nephrotoxic agents  - PRN zofran and tylenol     #Type 2 DM without complication, with long term insulin use  - last A1C 7.3%, BS controlled on admit  - ordered A1C  - SSI     #Hypertension  - BP controlled on admit  - may resume home meds if needed    #Hyperlipidemia  - ordered lipid panel  - resume home pravastatin 40 mg    Healthcare maintenance   -primary care provider is Pj Sanches MD   - as above    Diet: diabetic   VTE Risk: SCD  Code Status:  Full Code    Dispo: pending resolution of UTI, likely discharge home tomorrow  Estimated LOS: 24 hrs    Shaye Samuels MD  [facility] HO-I     [facility] Medicine Hospitalist Pager numbers:   [facility] Hospitalist  Medicine Team A (Omero/Cuauhtemoc):          [phone]  [facility] Hospitalist Medicine Team B (Elizabet/Peng):        [phone]

## 2023-12-20 NOTE — ED PROVIDER NOTES
Encounter Date: 2023       History     Chief Complaint   Patient presents with    Female  Problem     Blood in the urine, pain with urination and lower left side back pain X3 days. Nausea and vomiting also 3 days. Elevated CBG over 300 at home.      73-year-old female presents to ED with concern of left-sided mid back/flank pain that began 3 days ago followed by nausea, vomiting, hematuria and dysuria.  She denies any associated fevers, chills, body aches, bowel complaints.  Pain has improved today.  She does report history of kidney stone.  Numerous abdominal surgeries secondary to bowel obstructions.  No other acute complaints at this time.    The history is provided by the patient.     Review of patient's allergies indicates:   Allergen Reactions    Chlorhexidine gluconate Rash     Chlorhexidine/alcohol wipes. Rash and blistering.     Past Medical History:   Diagnosis Date    Chronic kidney disease, stage 3     Diabetes mellitus     Diabetes mellitus, type 2     Hematuria, unspecified     Hypercalcemia     Hypertension     Malnutrition of moderate degree 2020    Mixed hyperlipidemia 2022    Proteinuria     Renal cyst, left     UTI (urinary tract infection) 2020     Past Surgical History:   Procedure Laterality Date    ABDOMINAL SURGERY      CATHETERIZATION OF URETER Bilateral 2020    Procedure: CATHETERIZATION, URETER;  Surgeon: Kvng Cunningham MD;  Location: [facility] OR [address] [place] [address];  Service: Urology;  Laterality: Bilateral;     SECTION, CLASSIC      x2    CHOLECYSTECTOMY      CLOSURE, ILEOSTOMY, OPEN N/A 2021    Procedure: CLOSURE, ILEOSTOMY, OPEN, ERAS low;  Surgeon: Fabiana Valiente MD;  Location: [facility] OR [address] [place] [address];  Service: Colon and Rectal;  Laterality: N/A;    COLON SURGERY      [facility]    COLONOSCOPY N/A 2018    Procedure: COLONOSCOPY;  Surgeon: Gibran Aguayo MD;  Location: [facility] ENDO;  Service: Endoscopy;  Laterality: N/A;    COLOSTOMY Left      CYSTOSCOPY N/A 7/31/2020    Procedure: CYSTOSCOPY;  Surgeon: Kvng Cunningham MD;  Location: [facility] OR 2ND FLR;  Service: Urology;  Laterality: N/A;    CYSTOSCOPY WITH URETEROSCOPY, RETROGRADE PYELOGRAPHY, AND INSERTION OF STENT Left 2/22/2019    Procedure: CYSTOSCOPY, WITH RETROGRADE PYELOGRAM AND URETERAL STENT INSERTION with placement of a muir cath;  Surgeon: Ulisses Horton MD;  Location: [facility] OR;  Service: General;  Laterality: Left;    FLEXIBLE SIGMOIDOSCOPY N/A 2/9/2021    Procedure: SIGMOIDOSCOPY, FLEXIBLE;  Surgeon: Fabiana Valiente MD;  Location: [facility] OR 2ND FLR;  Service: Colon and Rectal;  Laterality: N/A;    HYSTEROSCOPY WITH DILATION AND CURETTAGE OF UTERUS N/A 12/9/2020    Procedure: HYSTEROSCOPY, WITH DILATION AND CURETTAGE OF UTERUS (MYOSURE);  Surgeon: Caitlyn Feng MD;  Location: [facility] OR;  Service: OB/GYN;  Laterality: N/A;  Guido notified 11/30,   video    HYSTEROSCOPY WITH DILATION AND CURETTAGE OF UTERUS N/A 3/4/2022    Procedure: HYSTEROSCOPY, WITH DILATION AND CURETTAGE OF UTERUS;  Surgeon: Caitlyn Feng MD;  Location: [facility] OR;  Service: OB/GYN;  Laterality: N/A;  Alexander notified CW 3/3  video confirmed    ILEOSTOMY  7/31/2020    Procedure: CREATION, ILEOSTOMY;  Surgeon: Fabiana Valiente MD;  Location: [facility] OR 2ND FLR;  Service: Colon and Rectal;;    INCISION AND DRAINAGE OF ABSCESS N/A 12/22/2019    Procedure: INCISION AND DRAINAGE, ABSCESS;  Surgeon: Ulisses Horton MD;  Location: [facility] OR;  Service: General;  Laterality: N/A;    INCISION OF ABDOMINAL WALL N/A 8/10/2018    Procedure: INCISION, ABDOMINAL WALL;  Surgeon: Ulisses Horton MD;  Location: [facility] OR;  Service: General;  Laterality: N/A;    INCISIONAL HERNIA REPAIR Right 2010    LOW ANTERIOR RESECTION OF COLON N/A 7/31/2020    Procedure: RESECTION, COLON, LOW ANTERIOR;  Surgeon: Fabiana Valiente MD;  Location: [facility] OR 2ND FLR;  Service: Colon and Rectal;  Laterality: N/A;    LYSIS OF ADHESIONS  7/31/2020     Procedure: LYSIS, ADHESIONS;  Surgeon: Fabiana Valiente MD;  [facility];  Service: Colon and Rectal;;    SURGICAL REMOVAL OF MASS OF LOWER EXTREMITY Bilateral 10/13/2023    Procedure: EXCISION, MASS ruthy ankle Malleolus;  Surgeon: Ulisses Horton MD;  Location: [facility] OR;  Service: General;  Laterality: Bilateral;     Family History   Problem Relation Age of Onset    Stroke Mother     Heart disease Mother     Hyperlipidemia Mother     Hypertension Mother     Alcohol abuse Father     Stroke Sister     Diabetes Sister     Heart disease Sister     Hyperlipidemia Sister     Hypertension Sister     Diabetes Brother     Early death Grandchild     Anesthesia problems Neg Hx     Broken bones Neg Hx     Cancer Neg Hx      Social History     Tobacco Use    Smoking status: Former     Current packs/day: 0.00     Types: Cigarettes     Quit date: 2000     Years since quittin.9    Smokeless tobacco: Never   Substance Use Topics    Alcohol use: No    Drug use: No     Review of Systems   Constitutional:  Negative for chills and fever.   Respiratory:  Negative for cough and shortness of breath.    Cardiovascular:  Negative for chest pain.   Gastrointestinal:  Positive for nausea and vomiting. Negative for constipation and diarrhea.   Genitourinary:  Positive for dysuria, flank pain and hematuria. Negative for vaginal bleeding.   Musculoskeletal:  Positive for back pain. Negative for neck pain and neck stiffness.   Neurological:  Negative for headaches.       Physical Exam     Initial Vitals [23 0933]   BP Pulse Resp Temp SpO2   115/70 68 18 97.9 °F (36.6 °C) 99 %      MAP       --         Physical Exam    Nursing note and vitals reviewed.  Constitutional: She appears well-developed and well-nourished. She is cooperative. She does not have a sickly appearance. She does not appear ill. No distress.   HENT:   Head: Normocephalic and atraumatic.   Eyes: EOM are normal.   Neck: Neck supple.   Normal  range of motion.  Cardiovascular:  Normal rate, regular rhythm and normal heart sounds.           Pulmonary/Chest: Effort normal and breath sounds normal.   Abdominal: Abdomen is soft. There is abdominal tenderness in the suprapubic area.   Mildly reproducible tenderness to left flank region with mild suprapubic tenderness.  Numerous scars over abdomen from previous surgeries.  No other acute skin changes noted.  No guarding.   Musculoskeletal:         General: No tenderness.      Cervical back: Normal range of motion and neck supple.     Neurological: She is alert and oriented to person, place, and time. She is not disoriented. GCS eye subscore is 4. GCS verbal subscore is 5. GCS motor subscore is 6.   Skin: Skin is warm and dry.   Psychiatric: She has a normal mood and affect. Her speech is normal and behavior is normal. Thought content normal.         ED Course   Procedures  Labs Reviewed   CBC W/ AUTO DIFFERENTIAL - Abnormal; Notable for the following components:       Result Value    MPV 9.0 (*)     Gran # (ANC) 8.7 (*)     Gran % 80.4 (*)     Lymph % 13.4 (*)     All other components within normal limits   COMPREHENSIVE METABOLIC PANEL - Abnormal; Notable for the following components:    Sodium 135 (*)     Glucose 144 (*)     BUN 33 (*)     Creatinine 2.0 (*)     eGFR 26 (*)     Anion Gap 7 (*)     All other components within normal limits   URINALYSIS, REFLEX TO URINE CULTURE - Abnormal; Notable for the following components:    Color, UA Red (*)     Appearance, UA Cloudy (*)     Protein, UA 1+ (*)     Glucose, UA Trace (*)     Occult Blood UA 3+ (*)     Leukocytes, UA Trace (*)     All other components within normal limits    Narrative:     Specimen Source->Urine   URINALYSIS MICROSCOPIC - Abnormal; Notable for the following components:    RBC, UA >100 (*)     WBC, UA 50 (*)     All other components within normal limits    Narrative:     Specimen Source->Urine   CULTURE, URINE   SODIUM, URINE, RANDOM    CREATININE, URINE, RANDOM   HEMOGLOBIN A1C   POCT GLUCOSE MONITORING CONTINUOUS          Imaging Results              CT Renal Stone Study ABD Pelvis WO (Final result)  Result time 12/20/23 13:43:25      Final result by Jem Davila MD (12/20/23 13:43:25)                   Impression:      No evidence of nephrolithiasis or obstructive uropathy.    Extensive postoperative changes in bowel as seen previously.    Additional findings discussed in the body of the report.      Electronically signed by: Jem Davila MD  Date:    12/20/2023  Time:    13:43               Narrative:    EXAMINATION:  CT RENAL STONE STUDY ABD PELVIS WO    CLINICAL HISTORY:  Flank pain, kidney stone suspected;    TECHNIQUE:  Low dose axial images, sagittal and coronal reformations were obtained from the lung bases to the pubic symphysis. Oral and IV contrast not administered.    COMPARISON:  CT abdomen pelvis with IV contrast, 04/02/2023.    FINDINGS:  Lower chest: Heart size is normal. Coronary artery calcifications.  Minimal pericardial fluid.  Bibasilar subsegmental atelectasis.  No consolidation.  No pleural effusion.    Abdomen:    Evaluation of the solid abdominal organs and bowel is limited in the absence of IV contrast.    Liver is normal in size and contour.  No worrisome liver mass identified on this noncontrast study.  Gallbladder is surgically absent.  No intra-or extrahepatic biliary ductal dilatation.    Spleen is not enlarged.  Adrenal glands and pancreas are negative for acute finding.    Kidneys are symmetric.  No renal or ureteral stones. No hydronephrosis.  Simple appearing cysts in the kidneys.    Small hiatal hernia.  No small bowel obstruction.  Similar operative changes of prior small bowel resections as seen previously.  Postoperative changes in the sigmoid colon and partial colectomy.  Few diverticula in the sigmoid colon.  No convincing findings of acute diverticulitis.  Evaluation for bowel inflammation  limited by absence of IV contrast.    No abdominal free fluid or pneumoperitoneum.    Abdominal aorta is normal in course and caliber with moderate calcific atherosclerosis.  Mild flattening of the IVC similar to prior study, potentially related to volume status.    No bulky retroperitoneal lymphadenopathy.    Pelvis: Urinary bladder, uterus, and rectum are unremarkable.  No pelvic free fluid.    Bones and soft tissues: No aggressive osseous lesions. Mild degenerative changes in the spine.  Extraperitoneal soft tissues are negative for acute finding.  Abdominal wall laxity and ventral abdominal wall fat containing hernia.  Postoperative changes and scarring in the lower anterior abdominal wall.                                       Medications   ondansetron injection 4 mg (has no administration in time range)   sodium chloride 0.9% flush 10 mL (has no administration in time range)   enoxaparin injection 30 mg (has no administration in time range)   cefTRIAXone (ROCEPHIN) 1 g in dextrose 5 % in water (D5W) 100 mL IVPB (MB+) (has no administration in time range)   acetaminophen tablet 1,000 mg (has no administration in time range)   glucose chewable tablet 16 g (has no administration in time range)   glucose chewable tablet 24 g (has no administration in time range)   dextrose 50% injection 12.5 g (has no administration in time range)   dextrose 50% injection 25 g (has no administration in time range)   glucagon (human recombinant) injection 1 mg (has no administration in time range)   insulin aspart U-100 pen 0-5 Units (has no administration in time range)   0.9%  NaCl infusion (has no administration in time range)   sodium chloride 0.9% bolus 500 mL 500 mL (0 mLs Intravenous Stopped 12/20/23 1221)   ondansetron injection 4 mg (4 mg Intravenous Given 12/20/23 1129)   cefTRIAXone (ROCEPHIN) 1 g in dextrose 5 % in water (D5W) 100 mL IVPB (MB+) (0 g Intravenous Stopped 12/20/23 1221)     Medical Decision Making  Patient  presents with concern of left-sided flank/back pain along with nausea, vomiting and hematuria with dysuria that began 3 days ago.  Afebrile with vitals WNL.  Patient no distress at this time.    DDx:  Including but not limited to UTI, pyelonephritis, nephrolithiasis, urolithiasis, URI, dehydration, GERD, intestinal spasm, gastroenteritis, gastritis, ulcer, cholecystitis, cholelithiasis, gallstones, pancreatitis, ileus, small bowel obstruction, appendicitis, diverticulitis, colitis, constipation, intestinal gas pain.        Amount and/or Complexity of Data Reviewed  Labs: ordered. Decision-making details documented in ED Course.  Radiology: ordered. Decision-making details documented in ED Course.    Risk  Prescription drug management.              Attending Attestation:             Attending ED Notes:   Patient seen by physician's assistant, chart signed out to her by me.  I discussed this patient with the physician assistant but did not perform a face-to-face evaluation of this patient.      ED Course as of 12/20/23 1550   Wed Dec 20, 2023   1037 Urinalysis, Reflex to Urine Culture Urine, Clean Catch(!)  Significant for hematuria with >100 RBC.  Also noting 50 WBC.  Nitrite negative.  Urine sent to culture. [KS]   1048 CBC auto differential(!)  Unremarkable.  No elevation in WBC.  Stable H/H.  [KS]   1434 Comprehensive metabolic panel(!)  Noting creatinine 2.0, baseline 1.1-1.3.  BUN elevated 33.  GFR 26, baseline 44.  Findings concerning for URI [KS]   1435 CT Renal Stone Study ABD Pelvis WO  CT renal noting postoperative changes but no other acute findings noted. [KS]   1435 Patient given IV fluids, Rocephin and Zofran in ED. she was discussed with [facility] internal medicine team for placement in observation. [KS]   1436 Patient discussed with attending, Dr. Cordova, who agrees with ED course and dispo. [KS]      ED Course User Index  [KS] Juev Dennison PA-C                           Clinical Impression:  Final  diagnoses:  [R31.9] Hematuria, unspecified type  [N17.9] URI (acute kidney injury) (Primary)  [N30.01] Acute cystitis with hematuria          ED Disposition Condition    Observation Stable                Juve Dennison PA-C  12/20/23 7147       Felix Cordova MD  12/20/23 1801

## 2023-12-20 NOTE — PHARMACY MED REC
"  Admission Medication History     The home medication history was taken by Laurie Chase CPhT.    Medication history obtained from, Patient Verified    You may go to "Admission" then "Reconcile Home Medications" tabs to review and/or act upon these items.     The home medication list has been updated by the Pharmacy department.   Please read ALL comments highlighted in yellow.   Please address this information as you see fit.    Feel free to contact us if you have any questions or require assistance.      The medications listed below were removed from the home medication list.  Please reorder if appropriate:  Patient reports no longer taking the following medication(s):  Lantus 100 unit/ml pen        Laurie Chase CPhT.  Ext [phone]             .          "

## 2023-12-21 VITALS
WEIGHT: 167 LBS | HEART RATE: 83 BPM | RESPIRATION RATE: 18 BRPM | BODY MASS INDEX: 30.73 KG/M2 | TEMPERATURE: 98 F | SYSTOLIC BLOOD PRESSURE: 136 MMHG | HEIGHT: 62 IN | DIASTOLIC BLOOD PRESSURE: 78 MMHG | OXYGEN SATURATION: 96 %

## 2023-12-21 PROBLEM — R10.9 ACUTE FLANK PAIN: Status: RESOLVED | Noted: 2023-12-20 | Resolved: 2023-12-21

## 2023-12-21 PROBLEM — R31.9 HEMATURIA: Status: ACTIVE | Noted: 2023-12-21

## 2023-12-21 LAB
ALBUMIN SERPL BCP-MCNC: 3.2 G/DL (ref 3.5–5.2)
ALP SERPL-CCNC: 67 U/L (ref 55–135)
ALT SERPL W/O P-5'-P-CCNC: 18 U/L (ref 10–44)
ANION GAP SERPL CALC-SCNC: 9 MMOL/L (ref 8–16)
AST SERPL-CCNC: 23 U/L (ref 10–40)
BASOPHILS # BLD AUTO: 0.03 K/UL (ref 0–0.2)
BASOPHILS NFR BLD: 0.4 % (ref 0–1.9)
BILIRUB SERPL-MCNC: 0.4 MG/DL (ref 0.1–1)
BUN SERPL-MCNC: 30 MG/DL (ref 8–23)
CALCIUM SERPL-MCNC: 9.3 MG/DL (ref 8.7–10.5)
CHLORIDE SERPL-SCNC: 103 MMOL/L (ref 95–110)
CHOLEST SERPL-MCNC: 105 MG/DL (ref 120–199)
CHOLEST/HDLC SERPL: 4.2 {RATIO} (ref 2–5)
CO2 SERPL-SCNC: 25 MMOL/L (ref 23–29)
CREAT SERPL-MCNC: 1.9 MG/DL (ref 0.5–1.4)
DIFFERENTIAL METHOD: ABNORMAL
EOSINOPHIL # BLD AUTO: 0.1 K/UL (ref 0–0.5)
EOSINOPHIL NFR BLD: 0.9 % (ref 0–8)
ERYTHROCYTE [DISTWIDTH] IN BLOOD BY AUTOMATED COUNT: 13.3 % (ref 11.5–14.5)
EST. GFR  (NO RACE VARIABLE): 28 ML/MIN/1.73 M^2
GLUCOSE SERPL-MCNC: 152 MG/DL (ref 70–110)
HCT VFR BLD AUTO: 41.2 % (ref 37–48.5)
HDLC SERPL-MCNC: 25 MG/DL (ref 40–75)
HDLC SERPL: 23.8 % (ref 20–50)
HGB BLD-MCNC: 13.8 G/DL (ref 12–16)
HIV 1+2 AB+HIV1 P24 AG SERPL QL IA: NORMAL
IMM GRANULOCYTES # BLD AUTO: 0.02 K/UL (ref 0–0.04)
IMM GRANULOCYTES NFR BLD AUTO: 0.2 % (ref 0–0.5)
LDLC SERPL CALC-MCNC: 40 MG/DL (ref 63–159)
LYMPHOCYTES # BLD AUTO: 2 K/UL (ref 1–4.8)
LYMPHOCYTES NFR BLD: 23 % (ref 18–48)
MCH RBC QN AUTO: 29 PG (ref 27–31)
MCHC RBC AUTO-ENTMCNC: 33.5 G/DL (ref 32–36)
MCV RBC AUTO: 87 FL (ref 82–98)
MONOCYTES # BLD AUTO: 0.6 K/UL (ref 0.3–1)
MONOCYTES NFR BLD: 6.8 % (ref 4–15)
NEUTROPHILS # BLD AUTO: 5.8 K/UL (ref 1.8–7.7)
NEUTROPHILS NFR BLD: 68.7 % (ref 38–73)
NONHDLC SERPL-MCNC: 80 MG/DL
NRBC BLD-RTO: 0 /100 WBC
PHOSPHATE SERPL-MCNC: 3.7 MG/DL (ref 2.7–4.5)
PLATELET # BLD AUTO: 308 K/UL (ref 150–450)
PMV BLD AUTO: 9.1 FL (ref 9.2–12.9)
POCT GLUCOSE: 125 MG/DL (ref 70–110)
POCT GLUCOSE: 205 MG/DL (ref 70–110)
POTASSIUM SERPL-SCNC: 4.3 MMOL/L (ref 3.5–5.1)
PROT SERPL-MCNC: 7.4 G/DL (ref 6–8.4)
RBC # BLD AUTO: 4.76 M/UL (ref 4–5.4)
SODIUM SERPL-SCNC: 137 MMOL/L (ref 136–145)
TRIGL SERPL-MCNC: 200 MG/DL (ref 30–150)
WBC # BLD AUTO: 8.49 K/UL (ref 3.9–12.7)

## 2023-12-21 PROCEDURE — 96361 HYDRATE IV INFUSION ADD-ON: CPT

## 2023-12-21 PROCEDURE — G0378 HOSPITAL OBSERVATION PER HR: HCPCS

## 2023-12-21 PROCEDURE — 85025 COMPLETE CBC W/AUTO DIFF WBC: CPT

## 2023-12-21 PROCEDURE — 80053 COMPREHEN METABOLIC PANEL: CPT

## 2023-12-21 PROCEDURE — 63600175 PHARM REV CODE 636 W HCPCS

## 2023-12-21 PROCEDURE — 36415 COLL VENOUS BLD VENIPUNCTURE: CPT

## 2023-12-21 PROCEDURE — 84100 ASSAY OF PHOSPHORUS: CPT

## 2023-12-21 PROCEDURE — 80061 LIPID PANEL: CPT

## 2023-12-21 PROCEDURE — 87389 HIV-1 AG W/HIV-1&-2 AB AG IA: CPT

## 2023-12-21 PROCEDURE — 25000003 PHARM REV CODE 250

## 2023-12-21 PROCEDURE — 96376 TX/PRO/DX INJ SAME DRUG ADON: CPT

## 2023-12-21 RX ORDER — CIPROFLOXACIN 500 MG/1
250 TABLET ORAL EVERY 12 HOURS
Qty: 3 TABLET | Refills: 0 | Status: SHIPPED | OUTPATIENT
Start: 2023-12-21 | End: 2023-12-21 | Stop reason: SDUPTHER

## 2023-12-21 RX ORDER — METFORMIN HYDROCHLORIDE 1000 MG/1
1000 TABLET ORAL 2 TIMES DAILY WITH MEALS
Start: 2023-12-21

## 2023-12-21 RX ORDER — CIPROFLOXACIN 500 MG/1
250 TABLET ORAL EVERY 12 HOURS
Qty: 3 TABLET | Refills: 0 | Status: SHIPPED | OUTPATIENT
Start: 2023-12-21 | End: 2023-12-24

## 2023-12-21 RX ORDER — ONDANSETRON 4 MG/1
4 TABLET, ORALLY DISINTEGRATING ORAL EVERY 6 HOURS PRN
Qty: 16 TABLET | Refills: 0 | Status: SHIPPED | OUTPATIENT
Start: 2023-12-21 | End: 2023-12-25

## 2023-12-21 RX ADMIN — CEFTRIAXONE SODIUM 1 G: 1 INJECTION, POWDER, FOR SOLUTION INTRAMUSCULAR; INTRAVENOUS at 11:12

## 2023-12-21 RX ADMIN — SODIUM CHLORIDE, POTASSIUM CHLORIDE, SODIUM LACTATE AND CALCIUM CHLORIDE 1000 ML: 600; 310; 30; 20 INJECTION, SOLUTION INTRAVENOUS at 07:12

## 2023-12-21 RX ADMIN — ONDANSETRON 4 MG: 2 INJECTION INTRAMUSCULAR; INTRAVENOUS at 01:12

## 2023-12-21 RX ADMIN — ONDANSETRON 4 MG: 2 INJECTION INTRAMUSCULAR; INTRAVENOUS at 08:12

## 2023-12-21 RX ADMIN — ACETAMINOPHEN 1000 MG: 500 TABLET ORAL at 01:12

## 2023-12-21 NOTE — PROGRESS NOTES
12/21/23 0156   Admission   Initial VN Admission Questions Complete   Shift   Pain Management Interventions pain management plan reviewed with patient/caregiver   Virtual Nurse - Patient Verbalized Approval Of Camera Use;VN Rounding   Safety/Activity   Safety Promotion/Fall Prevention Fall Risk reviewed with patient/family;other (see comments)  (room dark; unable to visualize pt and surroundings)     VN cued in to pt's room with permission. Pt Romanian speaking.  Patricia, ID #[id number], via [email or web address] monitor utilized for all translation needs. Admission questions completed. Plan of care reviewed with pt. Pt denies any questions or concerns or needs at this time. Instructed to call for needs/assist oob.

## 2023-12-21 NOTE — PLAN OF CARE
SW met with pt at bedside this AM to complete DCA and F/D. Pt stated that she lives at home with her son Andry [phone] and will have him help transport home later today. Pt reported that she does not use any HME at home and that she is fully independent in her ADLs. Pt still drives and is not current with a HH company. Rounds completed on pt. All questions addressed. Bedside nurse to discuss d/c medications. Discussed importance to attend all f/u appts and take medications as prescribed. Verbalized understanding. Case Management to sign off.     Froylan Bryant, MSJERE  [phone]    Future Appointments   Date Time Provider Department Center   1/16/2024 10:15 AM Ulisses Horton MD [facility]   1/25/2024  8:15 AM Ann-Marie Stephen DPM [facility] RODRÍGUEZ Plascenciai   1/29/2024  9:30 AM Caitlyn Feng MD [facility] JANICE Stephenson

## 2023-12-21 NOTE — PROGRESS NOTES
"[facility] Medicine Progress Note    Primary Team: [facility] Hospitalist Team B  Attending Physician: Dr. Khoury  Resident: Dr. Tracy  Intern: Dr. Samuels    Admission Date:  12/20/2023  Hospital Day: Hospital Day: 2    Chief Complaint: L flank pain, bloody urine and pain with urination x 3days       Subjective/Interval History      Mozambican telephone  used.    NAEON. AFVSS on RA. Patient doing well this morning without major symptoms including no fevers/chill, flank pain, abdominal pain, dysuria or hematuria. Reports some nausea overnight, no emesis episodes. Tolerating food well.     Review of Systems:  All other systems reviewed and negative.     Objective     Physical Examination:  /71   Pulse 81   Temp 97.6 °F (36.4 °C)   Resp 16   Ht 5' 2" (1.575 m)   Wt 75.8 kg (167 lb)   LMP  (LMP Unknown)   SpO2 96%   BMI 30.54 kg/m²    General: alert, no acute distress, eating in bed  HEENT: atraumatic, normocephalic, conjunctiva clear  Cardiovascular: Regular rate and rhythm, normal S1 and S2, no extra heart sounds or murmurs appreciated    Respiratory: normal work of breathing, no conversational dyspnea, no accessory muscle use noted, clear to auscultation bilaterally, no wheezes or crackles   Abdominal: Non-distended, soft, hyperactive bowel sounds, non-tender to palpation, no guarding  Extremities: Atraumatic, no cyanosis or edema, moving all four extremities  Pulses: strong peripheral pulses  Skin: Non-diaphoretic, warm, dry.   Neurologic: AAOx3, no gross focal deficits  Psychiatric: Normal mood and affect    Intake/Output:    Intake/Output Summary (Last 24 hours) at 12/21/2023 0709  Last data filed at 12/20/2023 1221  Gross per 24 hour   Intake 600 ml   Output --   Net 600 ml     Net IO Since Admission: 600 mL [12/21/23 0709]    Laboratory:  Recent Labs   Lab 12/20/23  1030 12/21/23  0345   WBC 10.76 8.49   HGB 14.6 13.8    308   MCV 86 87   * 137   K 4.1 4.3    103   CO2 28 25 " "  BUN 33* 30*   CREATININE 2.0* 1.9*   * 152*   CALCIUM 9.2 9.3   PROT 8.0 7.4   ALBUMIN 3.5 3.2*   PHOS  --  3.7   AST 27 23   ALT 21 18   ALKPHOS 68 67     No results for input(s): "APTT", "INR" in the last 168 hours.    Invalid input(s): "APT"  Lab Results   Component Value Date    COLORU Red (A) 12/20/2023    APPEARANCEUA Cloudy (A) 12/20/2023    SPECGRAV 1.025 12/20/2023    PHUR 6.0 12/20/2023    PROTEINUA 1+ (A) 12/20/2023    KETONESU Negative 12/20/2023    LEUKOCYTESUR Trace (A) 12/20/2023    NITRITE Negative 12/20/2023    UROBILINOGEN Negative 12/20/2023       All laboratory data reviewed    Microbiology Data:   Urine clx pending     EKG (my interpretation):  none     Imaging Data: Reviewed  12/20/23 CT Renal Stone Study ABD Pelvis WO  No evidence of nephrolithiasis or obstructive uropathy. No hydronephrosis. Simple cysts in kidneys. Extensive postoperative changes in bowel as seen previously.     Current Medications:     Infusions:       Scheduled:   cefTRIAXone (ROCEPHIN) IVPB  1 g Intravenous Q24H    pravastatin  40 mg Oral QHS        PRN:  acetaminophen, dextrose 10%, dextrose 10%, glucagon (human recombinant), glucose, glucose, insulin aspart U-100, ondansetron, sodium chloride 0.9%    Antibiotics and Day Number of Therapy:  Rocephin 12/21-present      Assessment/Plan:     Azucena Morrison is a 73 y.o. Mohawk speaking female with past medical history of HTN, CKD3a, T2DM (A1c 7.3%, on insulin),  who presented on 12/20/2023 to [facility] for L flank pain, bloody urine and pain with urination for 3 days with nausea and vomiting.  Patient is admitted to [facility] for URI on CKD, acute cystitis with hematuria. Improving with IVF and Rocephin.      #URI on CKD Stage 3a  #Acute Cystitis with Hematuria (resolved)  #Acute flank pain (resolved)  - Presented with L flank pain, hematuria, dysuria. On exam, afebrile, no CVA tenderness. No leukocytosis. Cr spike 2 (Baseline Cr 1-1.3). U/A positive for " pyuria, hematuria. CT AP neg for obstruction.   - Picture not worrisome for pyelo. However in setting of CKD, keep overnight to monitor renal function.  - Hematuria likely 2/2 UTI.   - Received 1 dose rocephin and IVF in ED  - FENa 0.5% indicating pre-renal injury  PLAN:  - follow urine clx  - resume rocephin, send home with PO Abx (sensitive to macrobid in 2020)   - s/p 1L NS, ordered NS 1L today   - daily CMP, phos, CBC  - urine studies   - avoid NSAIDs and nephrotoxic agents  - PRN zofran and tylenol   - Need to repeat U/A outpatient to ensure hematuria clearance.      #Elevated Protein Gap  - HIV: never, ordered  - hepatitis: last on 1/2023, ordered again  - serum protein Electrophoresis 7/2023 done    #Type 2 DM without complication, with long term insulin use  - last A1C 7.3%, BS controlled on admit  - A1C 6.7%   - SSI      #Hypertension  - BP controlled on admit  - may resume home meds if needed     #Hyperlipidemia  - lipid panel unremarkable  - resume home pravastatin 40 mg    #Healthcare maintenance   -primary care provider is Pj Sanches MD   - immunizations: needs flu and shingles  - cancer screening: UTD       Diet: diabetic   VTE Risk: SCD  Code Status:  Full Code     Dispo: likely discharge home today  Estimated LOS: 24 hrs      Shaye Samuels MD  [facility] Internal Medicine HO-I     [facility] Medicine Hospitalist Pager numbers:   [facility] Hospitalist Medicine Team A (Omero/Cuauhtemoc):          [phone]  [facility] Hospitalist Medicine Team B (Elizabet/Peng):        [phone]

## 2023-12-21 NOTE — PLAN OF CARE
Problem: Adult Inpatient Plan of Care  Goal: Plan of Care Review  12/21/2023 1341 by Luna Mendez, RN  Outcome: Met  12/21/2023 0834 by Luna Mendez RN  Outcome: Ongoing, Progressing  Goal: Patient-Specific Goal (Individualized)  Outcome: Met  Goal: Absence of Hospital-Acquired Illness or Injury  Outcome: Met  Goal: Optimal Comfort and Wellbeing  Outcome: Met  Goal: Readiness for Transition of Care  Outcome: Met     Problem: Diabetes Comorbidity  Goal: Blood Glucose Level Within Targeted Range  Outcome: Met     Problem: Fluid and Electrolyte Imbalance (Acute Kidney Injury/Impairment)  Goal: Fluid and Electrolyte Balance  Outcome: Met     Problem: Oral Intake Inadequate (Acute Kidney Injury/Impairment)  Goal: Optimal Nutrition Intake  Outcome: Met     Problem: Renal Function Impairment (Acute Kidney Injury/Impairment)  Goal: Effective Renal Function  Outcome: Met     Problem: UTI (Urinary Tract Infection)  Goal: Improved Infection Symptoms  Outcome: Met     Problem: Hypertension Comorbidity  Goal: Blood Pressure in Desired Range  Outcome: Met

## 2023-12-21 NOTE — PROGRESS NOTES
Discharge orders noted. Additional clinical references attached. Patient's discharge instructions given by bedside with the assistance of  #[id number] Shanti PEÑA and reviewed via this VN.  Education provided on new and previous medications, diagnosis, and follow-up appointments.  New medication was sent to patient's pharmacy. Patient verbalized understanding and teach back method was used. Patient's ride/transportation home at bedside. All questions answered. Transport to [facility] requested. Floor nurse notified.                12/21/23 4941    Notification    Notified Of Discharge Status   Admission   Communication Issues? None   Shift   Virtual Nurse - Rounding Complete  (discharge)   Virtual Nurse - Patient Verbalized Approval Of Camera Use   Safety/Activity   Patient Rounds bed in low position;call light in patient/parent reach;clutter free environment maintained;visualized patient   Safety Promotion/Fall Prevention side rails raised x 2   Positioning   Body Position supine   Head of Bed (HOB) Positioning HOB elevated

## 2023-12-21 NOTE — DISCHARGE SUMMARY
[facility] Medicine Discharge Summary    Primary Team: \[facility]\"" Hospitalist Team B  Attending Physician: Dr. Khoury  Resident: Dr. Tracy  Intern: Dr. Samuels    Date of Admit: 2023  Date of Discharge: 2023    Discharge to: Home/Self-Care  Condition: Stable    Discharge Diagnoses     Patient Active Problem List   Diagnosis    History of hemicolectomy    Status post Aiyana's procedure    Type 2 diabetes mellitus without complication, with long-term current use of insulin    Essential hypertension    Drainage from wound    S/P colostomy    Fistula of intestine, excluding rectum and anus    S/P exploratory laparotomy    Abscess of abdominal wall    Type 2 diabetes mellitus with skin complication    Unspecified local infection of skin and subcutaneous tissue    Diabetes with skin complication    Generalized abdominal pain    Lower abdominal pain    Colonic fistula    Polyp of colon    Abdominal wall abscess    Colocutaneous fistula    Postprocedural intraabdominal abscess    Stage 3a chronic kidney disease    Infection due to ESBL-producing Klebsiella pneumoniae    Cutaneous fistula    Malnutrition of moderate degree    Intra-abdominal abscess    Onychomycosis    Status post hysteroscopy    Ileostomy in place    S/P closure of ileostomy    Rotator cuff impingement syndrome of right shoulder    Preoperative clearance    Mixed hyperlipidemia    Chest pain of uncertain etiology    GRACE (dyspnea on exertion)    Ventral hernia without obstruction or gangrene    Obesity    Small bowel obstruction    Diarrhea    Mass of both ankles    Acute kidney injury    Acute cystitis with hematuria    Hematuria       Consultants and Procedures     Consultants:  none    Procedures:   none    Imagin23 CT Renal Stone Study ABD Pelvis WO  No evidence of nephrolithiasis or obstructive uropathy. No hydronephrosis. Simple cysts in kidneys. Extensive postoperative changes in bowel as seen previously.     Brief History of  Present Illness      Azucena Morrison is a 73 y.o. British speaking female with past medical history of HTN, CKD3a, T2DM (A1c 7.3%, on insulin),  who [place] [facility] [place] intermittent L flank pain, bloody urine and pain with urination for 3 days with nausea and vomiting.      On the day of discharge, patient was afebrile with stable vital signs and will be discharged in stable condition with discharged back to home.    For the full HPI please refer to the History & Physical from this admission.    Hospital Course By Problem with Pertinent Findings       #URI on CKD Stage 3a  #Acute Cystitis with Hematuria (resolved)  #Acute flank pain (resolved)  - Presented with intermittent L flank pain, hematuria, and dysuria. On exam, afebrile, no CVA tenderness. No leukocytosis. Cr spike 2 (Baseline Cr 1-1.3). U/A positive for pyuria, hematuria; neg for nitrites. CT AP neg for obstruction.   - She likely passed a stone with acute cystitis. Picture less worrisome for pyelo. However in setting of URI on CKD, kept overnight to monitor renal function. On day of discharge, asymptomatic.  - Hematuria likely 2/2 UTI.   - FENa 0.5% indicating pre-renal injury  - s/p 2 doses rocephin  - s/p 2L NS   - daily CMP, phos, CBC  - avoid NSAIDs and nephrotoxic agents  - PRN zofran and tylenol   - Discussed importance of hydration   PLAN:  - follow urine clx  - send home with PO cipro 250mg q12h for 3 days, start 12/22-end 12/24   - Need to repeat U/A outpatient to ensure hematuria clearance. If positive need to work up      #Elevated Protein Gap  - HIV: neg  - hepatitis neg 1/2023  - serum protein electrophoresis 7/2023 done  - f/u with nephrology for light chain assay      #Type 2 DM without complication, with long term insulin use  - last A1C 7.3%, BS controlled during stay  - A1C 6.7%   - SSI    - if GFR<30 hold metformin, if GFR<45 change metformin 500 BID      #Hypertension  - BP controlled during stay  - may resume  "home meds if needed     #Hyperlipidemia  - lipid panel unremarkable  - resume home pravastatin 40 mg     #Healthcare maintenance   -primary care provider is Pj Sanches MD   - immunizations: needs flu and shingles  - cancer screening: UTD        Discharge vital signs   /78 (Patient Position: Lying)   Pulse 83   Temp 98.1 °F (36.7 °C) (Oral)   Resp 18   Ht 5' 2" (1.575 m)   Wt 75.8 kg (167 lb)   LMP  (LMP Unknown)   SpO2 96%   BMI 30.54 kg/m²      Discharge Medications        Medication List        CHANGE how you take these medications      metFORMIN 1000 MG tablet  Commonly known as: GLUCOPHAGE  Take 1 tablet (1,000 mg total) by mouth 2 (two) times daily with meals.  What changed:   when to take this  Another medication with the same name was removed. Continue taking this medication, and follow the directions you see here.            CONTINUE taking these medications      acetaminophen 500 MG tablet  Commonly known as: TYLENOL     BD ONEIL 2ND GEN PEN NEEDLE 32 gauge x 5/32" Ndle  Generic drug: pen needle, diabetic     carvedilol 80 MG 24 hr capsule  Commonly known as: COREG CR     dicyclomine 10 MG capsule  Commonly known as: BENTYL  Take 1 capsule (10 mg total) by mouth 2 (two) times daily.     empagliflozin 25 mg tablet  Commonly known as: JARDIANCE  Take 1 tablet (25 mg total) by mouth once daily.     fenofibrate micronized 200 MG Cap  Commonly known as: LOFIBRA     glimepiride 1 MG tablet  Commonly known as: AMARYL     hydroCHLOROthiazide 25 MG tablet  Commonly known as: HYDRODIURIL  [place] tre tableta (25 mg en total) por vía oral diariamente.  (Take 1 tablet (25 mg total) by mouth once daily.)     KERENDIA 10 mg Tab  Generic drug: finerenone  Take 10 tablets by mouth once daily.     olmesartan 40 MG tablet  Commonly known as: BENICAR     pantoprazole 40 MG tablet  Commonly known as: PROTONIX  Take 1 tablet (40 mg total) by mouth once daily.     pravastatin 40 MG tablet  Commonly known as: " PRAVACHOL     solifenacin 10 MG tablet  Commonly known as: VESICARE     tirzepatide 7.5 mg/0.5 mL Pnij     * TRUE METRIX GLUCOSE TEST STRIP Strp  Generic drug: blood sugar diagnostic     * blood sugar diagnostic Strp  100 each by Misc.(Non-Drug; Combo Route) route once daily.     TRUEPLUS LANCETS 30 gauge Misc  Generic drug: lancets           * This list has 2 medication(s) that are the same as other medications prescribed for you. Read the directions carefully, and ask your doctor or other care provider to review them with you.                   Where to Get Your Medications        Information about where to get these medications is not yet available    Ask your nurse or doctor about these medications  metFORMIN 1000 MG tablet          Discharge Information:   Diet:  Diet diabetic  Diet renal    Physical Activity:  As tolerated             Instructions:  1. Take all medications as prescribed  2. Keep all follow-up appointments  3. Return to the hospital or call your primary care physicians if any worsening symptoms such as fever, chest pain, shortness of breath, return of symptoms, or any other concerns.    Follow-Up Appointments:  PCP appt tomorrow or week: check renal function and clearing hematuria; follow up pending urine clx; if GFR<30 hold metformin, if GFR<45 change metformin 500 BID; needs flu and shingles vaccine   Nephrology - recommend light chain assay to further work up protein gap      Shaye Samuels MD  [facility]  12/21/2023 11:01 AM

## 2023-12-24 LAB — BACTERIA UR CULT: ABNORMAL

## 2024-01-25 ENCOUNTER — OFFICE VISIT (OUTPATIENT)
Dept: PODIATRY | Facility: CLINIC | Age: 74
End: 2024-01-25
Payer: MEDICARE

## 2024-01-25 VITALS
HEART RATE: 71 BPM | HEIGHT: 62 IN | SYSTOLIC BLOOD PRESSURE: 138 MMHG | DIASTOLIC BLOOD PRESSURE: 92 MMHG | BODY MASS INDEX: 30.54 KG/M2

## 2024-01-25 DIAGNOSIS — E11.42 DIABETIC POLYNEUROPATHY ASSOCIATED WITH TYPE 2 DIABETES MELLITUS: ICD-10-CM

## 2024-01-25 DIAGNOSIS — E11.9 COMPREHENSIVE DIABETIC FOOT EXAMINATION, TYPE 2 DM, ENCOUNTER FOR: Primary | ICD-10-CM

## 2024-01-25 DIAGNOSIS — M77.41 METATARSALGIA OF BOTH FEET: ICD-10-CM

## 2024-01-25 DIAGNOSIS — M77.42 METATARSALGIA OF BOTH FEET: ICD-10-CM

## 2024-01-25 PROCEDURE — 3008F BODY MASS INDEX DOCD: CPT | Mod: CPTII,S$GLB,, | Performed by: STUDENT IN AN ORGANIZED HEALTH CARE EDUCATION/TRAINING PROGRAM

## 2024-01-25 PROCEDURE — 99999 PR PBB SHADOW E&M-EST. PATIENT-LVL III: CPT | Mod: PBBFAC,,, | Performed by: STUDENT IN AN ORGANIZED HEALTH CARE EDUCATION/TRAINING PROGRAM

## 2024-01-25 PROCEDURE — 3075F SYST BP GE 130 - 139MM HG: CPT | Mod: CPTII,S$GLB,, | Performed by: STUDENT IN AN ORGANIZED HEALTH CARE EDUCATION/TRAINING PROGRAM

## 2024-01-25 PROCEDURE — 1126F AMNT PAIN NOTED NONE PRSNT: CPT | Mod: CPTII,S$GLB,, | Performed by: STUDENT IN AN ORGANIZED HEALTH CARE EDUCATION/TRAINING PROGRAM

## 2024-01-25 PROCEDURE — 99203 OFFICE O/P NEW LOW 30 MIN: CPT | Mod: S$GLB,,, | Performed by: STUDENT IN AN ORGANIZED HEALTH CARE EDUCATION/TRAINING PROGRAM

## 2024-01-25 PROCEDURE — 3080F DIAST BP >= 90 MM HG: CPT | Mod: CPTII,S$GLB,, | Performed by: STUDENT IN AN ORGANIZED HEALTH CARE EDUCATION/TRAINING PROGRAM

## 2024-01-25 RX ORDER — DICLOFENAC SODIUM 10 MG/G
2 GEL TOPICAL 4 TIMES DAILY
Qty: 100 G | Refills: 1 | Status: SHIPPED | OUTPATIENT
Start: 2024-01-25 | End: 2024-03-25

## 2024-01-25 RX ORDER — DICLOFENAC SODIUM 10 MG/G
2 GEL TOPICAL 4 TIMES DAILY
Qty: 100 G | Refills: 1 | Status: SHIPPED | OUTPATIENT
Start: 2024-01-25 | End: 2024-01-25

## 2024-01-25 NOTE — PROGRESS NOTES
Subjective:     Patient ID: Azucena Morrison is a 73 y.o. female.    Chief Complaint: Diabetes Mellitus (PCP Dr. Oliva, ), Diabetic Foot Exam, Routine Foot Care, and Foot Problem (Numbness, 11/13/23 ankle surgery by Dr. Horton,)    Azucena is a 73 y.o. female who presents to the clinic upon referral from Dr. Melissa  for evaluation and treatment of diabetic feet. Azucena has a past medical history of Chronic kidney disease, stage 3, Diabetes mellitus, Diabetes mellitus, type 2, Hematuria, unspecified, Hypercalcemia, Hypertension, Malnutrition of moderate degree (08/23/2020), Mixed hyperlipidemia (02/07/2022), Proteinuria, Renal cyst, left, and UTI (urinary tract infection) (08/06/2020). Patient relates no major problem with feet. Only complaints today consist of here for a diabetic foot exam. Relates to occasional pain/burning sensation to bottom of both feet. No further pedal complaints.     PCP: Pj Sanches MD    Date Last Seen by PCP: per above    Current shoe gear: Casual shoes    Hemoglobin A1C   Date Value Ref Range Status   12/20/2023 6.7 (H) 4.0 - 5.6 % Final     Comment:     ADA Screening Guidelines:  5.7-6.4%  Consistent with prediabetes  >or=6.5%  Consistent with diabetes    High levels of fetal hemoglobin interfere with the HbA1C  assay. Heterozygous hemoglobin variants (HbS, HgC, etc)do  not significantly interfere with this assay.   However, presence of multiple variants may affect accuracy.     07/29/2023 7.3 (H) 4.0 - 5.6 % Final     Comment:     ADA Screening Guidelines:  5.7-6.4%  Consistent with prediabetes  >or=6.5%  Consistent with diabetes    High levels of fetal hemoglobin interfere with the HbA1C  assay. Heterozygous hemoglobin variants (HbS, HgC, etc)do  not significantly interfere with this assay.   However, presence of multiple variants may affect accuracy.     01/12/2023 5.8 (H) 4.0 - 5.6 % Final     Comment:     ADA Screening Guidelines:  5.7-6.4%  Consistent with prediabetes  >or=6.5%   Consistent with diabetes    High levels of fetal hemoglobin interfere with the HbA1C  assay. Heterozygous hemoglobin variants (HbS, HgC, etc)do  not significantly interfere with this assay.   However, presence of multiple variants may affect accuracy.           Review of Systems   Constitutional: Negative for chills, decreased appetite, diaphoresis and fever.   HENT:  Negative for congestion and hearing loss.    Cardiovascular:  Negative for chest pain, claudication, leg swelling and syncope.   Respiratory:  Negative for cough and shortness of breath.    Skin:  Positive for dry skin and nail changes. Negative for color change, flushing, itching, poor wound healing and rash.   Musculoskeletal:  Positive for joint pain. Negative for joint swelling.   Gastrointestinal:  Negative for nausea and vomiting.   Neurological:  Positive for paresthesias. Negative for focal weakness, numbness and weakness.   Psychiatric/Behavioral:  Negative for altered mental status. The patient is not nervous/anxious.         Objective:     Physical Exam  Constitutional:       General: She is not in acute distress.     Appearance: She is well-developed. She is not diaphoretic.   Cardiovascular:      Pulses:           Dorsalis pedis pulses are 2+ on the right side and 2+ on the left side.        Posterior tibial pulses are 2+ on the right side and 2+ on the left side.      Comments: Dorsalis pedis and posterior tibial pulses are within normal limits. Skin temperature is within normal limits. Toes are cool to touch and feet are warm proximally. Hair growth is within normal limits. Skin is normotrophic and without hyperpigmentation. No edema noted. No spider veins or varicosities noted, bilaterally.   Musculoskeletal:         General: No tenderness.      Comments: Adequate joint range of motion without pain, limitation, nor crepitation to bilateral feet and ankle joints. Muscle strength is 5/5 in all groups bilaterally.    No pinpoint  tenderness on physical exam   Feet:      Right foot:      Protective Sensation: 10 sites tested.  9 sites sensed.      Left foot:      Protective Sensation: 10 sites tested.  10 sites sensed.   Lymphadenopathy:      Comments: Negative lymphangitic streaking    Skin:     General: Skin is warm and dry.      Findings: No lesion.      Comments: Skin is warm and dry, no acute signs of infection noted. No open wounds, macerations or hyperkeratotic lesions, bilaterally.     Toenails are well trimmed and of normal morphology, bilaterally.        Neurological:      Mental Status: She is alert and oriented to person, place, and time.      Sensory: Sensory deficit present.      Motor: No abnormal muscle tone.      Comments: Light touch within normal limits. [place] 5.07 monofilamant testing is within normal limits.    Psychiatric:         Behavior: Behavior normal.         Thought Content: Thought content normal.         Judgment: Judgment normal.           Assessment:      Encounter Diagnoses   Name Primary?    Comprehensive diabetic foot examination, type 2 DM, encounter for Yes    Diabetic polyneuropathy associated with type 2 diabetes mellitus     Metatarsalgia of both feet      Plan:     Azucena was seen today for diabetes mellitus, diabetic foot exam, routine foot care and foot problem.    Diagnoses and all orders for this visit:    Comprehensive diabetic foot examination, type 2 DM, encounter for    Diabetic polyneuropathy associated with type 2 diabetes mellitus    Metatarsalgia of both feet    Other orders  -     Discontinue: diclofenac sodium (VOLTAREN) 1 % Gel; Apply 2 g topically 4 (four) times daily. for 10 days  -     diclofenac sodium (VOLTAREN) 1 % Gel; Apply 2 g topically 4 (four) times daily.      I counseled the patient on her conditions, their implications and medical management.  Discussed burning pain is likely secondary to peripheral neuropathy vs metatarsalgia. Recommend rest and elevation,  supportive tennis shoes at all times while ambualting. Rx Voltaren gel. Further management of peripheral neuropathy symptoms per PCP, consider Gabapentin  Shoe inspection. Diabetic Foot Education. Patient reminded of the importance of good nutrition and blood sugar control to help prevent podiatric complications of diabetes. Patient instructed on proper foot hygeine. We discussed wearing proper shoe gear, daily foot inspections, never walking without protective shoe gear, never putting sharp instruments to feet, she is considered low risk for diabetic foot complication  Return to clinic in 1 year or sooner PRN

## 2024-01-29 ENCOUNTER — OFFICE VISIT (OUTPATIENT)
Dept: OBSTETRICS AND GYNECOLOGY | Facility: CLINIC | Age: 74
End: 2024-01-29
Payer: MEDICARE

## 2024-01-29 VITALS
DIASTOLIC BLOOD PRESSURE: 91 MMHG | SYSTOLIC BLOOD PRESSURE: 160 MMHG | BODY MASS INDEX: 31.79 KG/M2 | WEIGHT: 173.81 LBS

## 2024-01-29 DIAGNOSIS — Z01.411 ENCOUNTER FOR GYNECOLOGICAL EXAMINATION WITH ABNORMAL FINDING: Primary | ICD-10-CM

## 2024-01-29 DIAGNOSIS — Z78.0 ASYMPTOMATIC MENOPAUSE: ICD-10-CM

## 2024-01-29 DIAGNOSIS — N89.8 VAGINAL IRRITATION: ICD-10-CM

## 2024-01-29 DIAGNOSIS — Z12.31 SCREENING MAMMOGRAM FOR BREAST CANCER: ICD-10-CM

## 2024-01-29 PROCEDURE — 3077F SYST BP >= 140 MM HG: CPT | Mod: CPTII,S$GLB,, | Performed by: OBSTETRICS & GYNECOLOGY

## 2024-01-29 PROCEDURE — 81514 NFCT DS BV&VAGINITIS DNA ALG: CPT | Performed by: OBSTETRICS & GYNECOLOGY

## 2024-01-29 PROCEDURE — G0101 CA SCREEN;PELVIC/BREAST EXAM: HCPCS | Mod: GZ,S$GLB,, | Performed by: OBSTETRICS & GYNECOLOGY

## 2024-01-29 PROCEDURE — 3080F DIAST BP >= 90 MM HG: CPT | Mod: CPTII,S$GLB,, | Performed by: OBSTETRICS & GYNECOLOGY

## 2024-01-29 PROCEDURE — 1101F PT FALLS ASSESS-DOCD LE1/YR: CPT | Mod: CPTII,S$GLB,, | Performed by: OBSTETRICS & GYNECOLOGY

## 2024-01-29 PROCEDURE — 99999 PR PBB SHADOW E&M-EST. PATIENT-LVL III: CPT | Mod: PBBFAC,,, | Performed by: OBSTETRICS & GYNECOLOGY

## 2024-01-29 PROCEDURE — 1159F MED LIST DOCD IN RCRD: CPT | Mod: CPTII,S$GLB,, | Performed by: OBSTETRICS & GYNECOLOGY

## 2024-01-29 PROCEDURE — 3288F FALL RISK ASSESSMENT DOCD: CPT | Mod: CPTII,S$GLB,, | Performed by: OBSTETRICS & GYNECOLOGY

## 2024-01-29 PROCEDURE — 1126F AMNT PAIN NOTED NONE PRSNT: CPT | Mod: CPTII,S$GLB,, | Performed by: OBSTETRICS & GYNECOLOGY

## 2024-01-29 RX ORDER — TRIAMCINOLONE ACETONIDE 1 MG/G
OINTMENT TOPICAL 2 TIMES DAILY
Qty: 30 G | Refills: 0 | Status: SHIPPED | OUTPATIENT
Start: 2024-01-29

## 2024-01-29 RX ORDER — KETOCONAZOLE 20 MG/G
CREAM TOPICAL DAILY
Qty: 30 G | Refills: 0 | Status: SHIPPED | OUTPATIENT
Start: 2024-01-29

## 2024-01-29 NOTE — PROGRESS NOTES
Chief Complaint   Patient presents with    Annual Exam       HISTORY OF PRESENT ILLNESS:   Azucena Morrison is a 71 y.o. female  who presents for well woman exam.  No LMP recorded (lmp unknown). Patient is postmenopausal..  She is ,menopausal at age 34. No bleeding. She has been having itching on her external vulva.     Past Medical History:   Diagnosis Date    Chronic kidney disease, stage 3     Diabetes mellitus     Diabetes mellitus, type 2     Hypertension     Malnutrition of moderate degree 2020    UTI (urinary tract infection) 2020   Urol  Past Surgical History:   Procedure Laterality Date    ABDOMINAL SURGERY      CATHETERIZATION OF URETER Bilateral 2020    Procedure: CATHETERIZATION, URETER;  Surgeon: Kvng Cunningham MD;  Location: [address];  Service: Urology;  Laterality: Bilateral;     SECTION, CLASSIC      x2    CHOLECYSTECTOMY      CLOSURE, ILEOSTOMY, OPEN N/A 2021    Procedure: CLOSURE, ILEOSTOMY, OPEN, ERAS low;  Surgeon: Fabiana Valiente MD;  Location: [address];  Service: Colon and Rectal;  Laterality: N/A;    COLON SURGERY      [facility]    COLONOSCOPY N/A 2018    Procedure: COLONOSCOPY;  Surgeon: Gibran Aguayo MD;  Location: [facility];  Service: Endoscopy;  Laterality: N/A;    COLOSTOMY Left     CYSTOSCOPY N/A 2020    Procedure: CYSTOSCOPY;  Surgeon: Kvng Cunningham MD;  Location: [address];  Service: Urology;  Laterality: N/A;    CYSTOSCOPY WITH URETEROSCOPY, RETROGRADE PYELOGRAPHY, AND INSERTION OF STENT Left 2019    Procedure: CYSTOSCOPY, WITH RETROGRADE PYELOGRAM AND URETERAL STENT INSERTION with placement of a muir cath;  Surgeon: Ulisses Horton MD;  Location: [facility];  Service: General;  Laterality: Left;    FLEXIBLE SIGMOIDOSCOPY N/A 2021    Procedure: SIGMOIDOSCOPY, FLEXIBLE;  Surgeon: Fabiana Valiente MD;  Location: [address];  Service: Colon and Rectal;  Laterality: N/A;     HYSTEROSCOPY WITH DILATION AND CURETTAGE OF UTERUS N/A 2020    Procedure: HYSTEROSCOPY, WITH DILATION AND CURETTAGE OF UTERUS (MYOSURE);  Surgeon: Caitlyn Feng MD;  Location: [facility] OR;  Service: OB/GYN;  Laterality: N/A;  Guido notified , EF  video    ILEOSTOMY  2020    Procedure: CREATION, ILEOSTOMY;  Surgeon: Fabiana Valiente MD;  Location: [facility] OR [facility] F[facility];  Service: Colon and Rectal;;    INCISION AND DRAINAGE OF ABSCESS N/A 2019    Procedure: INCISION AND DRAINAGE, ABSCESS;  Surgeon: Ulisses Horton MD;  Location: [facility] OR;  Service: General;  Laterality: N/A;    INCISION OF ABDOMINAL WALL N/A 8/10/2018    Procedure: INCISION, ABDOMINAL WALL;  Surgeon: Ulisses Horton MD;  Location: [facility] OR;  Service: General;  Laterality: N/A;    INCISIONAL HERNIA REPAIR Right     LOW ANTERIOR RESECTION OF COLON N/A 2020    Procedure: RESECTION, COLON, LOW ANTERIOR;  Surgeon: Fabiana Valiente MD;  [facility];  Service: Colon and Rectal;  Laterality: N/A;    LYSIS OF ADHESIONS  2020    Procedure: LYSIS, ADHESIONS;  Surgeon: Fabiana Valiente MD;  [facility];  Service: Colon and Rectal;;   ogy;  Laterality: Bilateral;     SECTION, CLASSIC      x2    CHOLECYSTECTOMY  1997    CLOSURE, ILEOSTOMY, OPEN N/A 2021    Procedure: CLOSURE, ILEOSTOMY, OPEN, ERAS low;  Surgeon: Fabiana Valiente MD;  [facility];  Service: Colon and Rectal;  Laterality: N/A;    COLON SURGERY      [facility]    COLONOSCOPY N/A 2018    Procedure: COLONOSCOPY;  Surgeon: Gibran Aguayo MD;  Location: [facility] ENDO;  Service: Endoscopy;  Laterality: N/A;    COLOSTOMY Left     CYSTOSCOPY N/A 2020    Procedure: CYSTOSCOPY;  Surgeon: Kvng Cunningham MD;  [facility];  Service: Urology;  Laterality: N/A;    CYSTOSCOPY WITH URETEROSCOPY, RETROGRADE PYELOGRAPHY, AND INSERTION OF STENT Left 2019    Procedure: CYSTOSCOPY, WITH  RETROGRADE PYELOGRAM AND URETERAL STENT INSERTION with placement of a muir cath;  Surgeon: Ulisses Horton MD;  Location: [facility] OR;  Service: General;  Laterality: Left;    FLEXIBLE SIGMOIDOSCOPY N/A 2021    Procedure: SIGMOIDOSCOPY, FLEXIBLE;  Surgeon: Fabiana Valiente MD;  Location: [facility] OR 2ND FLR;  Service: Colon and Rectal;  Laterality: N/A;    HYSTEROSCOPY WITH DILATION AND CURETTAGE OF UTERUS N/A 2020    Procedure: HYSTEROSCOPY, WITH DILATION AND CURETTAGE OF UTERUS (MYOSURE);  Surgeon: Caitlyn Feng MD;  Location: [facility] OR;  Service: OB/GYN;  Laterality: N/A;  Ugido notified , EF  video    ILEOSTOMY  2020    Procedure: CREATION, ILEOSTOMY;  Surgeon: Fabiana Valiente MD;  Location: [facility] OR 2ND FLR;  Service: Colon and Rectal;;    INCISION AND DRAINAGE OF ABSCESS N/A 2019    Procedure: INCISION AND DRAINAGE, ABSCESS;  Surgeon: Ulisses Horton MD;  Location: [facility] OR;  Service: General;  Laterality: N/A;    INCISION OF ABDOMINAL WALL N/A 8/10/2018    Procedure: INCISION, ABDOMINAL WALL;  Surgeon: Ulisses Horton MD;  Location: [facility] OR;  Service: General;  Laterality: N/A;    INCISIONAL HERNIA REPAIR Right     LOW ANTERIOR RESECTION OF COLON N/A 2020    Procedure: RESECTION, COLON, LOW ANTERIOR;  Surgeon: Fabiana Valiente MD;  Location: [facility] OR 2ND FLR;  Service: Colon and Rectal;  Laterality: N/A;    LYSIS OF ADHESIONS  2020    Procedure: LYSIS, ADHESIONS;  Surgeon: Fabiana Valiente MD;  Location: [facility] OR 2ND FLR;  Service: Colon and Rectal;;        Socioeconomic History    Marital status: Single   Tobacco Use    Smoking status: Former Smoker     Types: Cigarettes     Quit date: 2000     Years since quittin.8    Smokeless tobacco: Never Used   Substance and Sexual Activity    Alcohol use: No    Drug use: No    Sexual activity: Not Currently       Family History   Problem Relation Age of Onset    Stroke Mother     Heart disease Mother      Hyperlipidemia Mother     Hypertension Mother     Alcohol abuse Father     Stroke Sister     Diabetes Sister     Heart disease Sister     Hyperlipidemia Sister     Hypertension Sister     Diabetes Brother     Early death Grandchild     Anesthesia problems Neg Hx     Broken bones Neg Hx     Cancer Neg Hx          OB History    Para Term  AB Living   2 2 2         SAB IAB Ectopic Multiple Live Births                  # Outcome Date GA Lbr Rakesh/2nd Weight Sex Delivery Anes PTL Lv   2 Term            1 Term                COMPREHENSIVE GYN HISTORY:  PAP History: Denies abnormal Paps; 10/2020 NILm/HPv-  Infection History: Denies STDs. Denies PID  Benign History: Denies uterine fibroids. Denies ovarian cysts. Denies endometriosis Denies other conditions.  Cancer History: Denies cervical cancer. Denies uterine cancer or hyperplasia. Denies ovarian cancer. Denies vulvar cancer or pre-cancer. Denies vaginal cancer or pre-cancer. Denies breast cancer. Denies colon cancer.  Cycle: 17/irregular Q few months; stopped at 35 yo   10/2020 had PMB with EMS at 4 mm then EMB that showed scant tissue c/w endometrial hyperplasia then had HSC D&C that showed inactive endometrium, 6 months of provera  Repeat D&C 3/2022 and was benign     ROS:  Negative       BP (!) 158/92   Wt 73.9 kg (162 lb 14.7 oz)   LMP  (LMP Unknown)   BMI 27.97 kg/m²     APPEARANCE: Well nourished, well developed, in no acute distress.  NECK: Neck symmetric .  ABDOMEN: Soft. Large vertical incisicion with scars c/w colostomy with reversal   BREASTS: Symmetrical, no skin changes or visible lesions. No palpable masses, nipple discharge or adenopathy bilaterally.  PELVIC:   VULVA: 3mm papular lesion on left labia major with yellow hue, Normal female genitalia.  URETHRAL MEATUS: Normal size and location, no lesions, no prolapse.  URETHRA: No masses, tenderness, prolapse or scarring.  VAGINA: atrophic, no discharge, no significant cystocele or rectocele.  No bleeding noted   CERVIX: No lesions and discharge.  UTERUS: Normal size, regular shape, mobile, non-tender, bladder base nontender.  ADNEXA: No masses or tenderness.  PERINEUM: Normal, no masses or lesions     Data Reviewed:     Lipid profile:   Lab Results   Component Value Date    CHOL 291 (H) 06/11/2008     Lab Results   Component Value Date    HDL 23 (L) 06/11/2008     Lab Results   Component Value Date    LDLCALC Invalid, Trig > 400 06/11/2008     Lab Results   Component Value Date    TRIG 626 (H) 08/20/2020    TRIG 348 (H) 08/17/2020    TRIG 192 (H) 08/10/2020     Lab Results   Component Value Date    CHOLHDL 7.9 (L) 06/11/2008         1. Encounter for annual routine gynecological examination    2. Endometrial hyperplasia    3. Vulvar irritation     Plan: 1. Routine gyn annual exam. s/p normal breast exam and MMG ordered.   Pap with HPV cotesting up to date  STD testing: declined.   Lipid Profile, needed every 5 years, up to date. Fasting glucose, needed every 3 years, up to date. TSH, needed every 5 years, up to date.  Colonoscopy up to date. DEXA ordered today   2. Will repeat endometrial sampling if bleeding or other issues occurs.   3. Most likely yeast vs irritant vs atrophy, affrim done, if not improved then can try estrace    F/u in 1 yr or PRN

## 2024-01-31 ENCOUNTER — PATIENT MESSAGE (OUTPATIENT)
Dept: OBSTETRICS AND GYNECOLOGY | Facility: HOSPITAL | Age: 74
End: 2024-01-31
Payer: MEDICARE

## 2024-01-31 LAB
BACTERIAL VAGINOSIS DNA: POSITIVE
CANDIDA GLABRATA DNA: POSITIVE
CANDIDA KRUSEI DNA: NEGATIVE
CANDIDA RRNA VAG QL PROBE: NEGATIVE
T VAGINALIS RRNA GENITAL QL PROBE: NEGATIVE

## 2024-01-31 NOTE — TELEPHONE ENCOUNTER
Please let her know that she tested positive for yeast and bacterial vaginosis that could be causing some of the irritation. I would advise to treat both with compounding medication boric acid that she would place nightly vaginally for 2 weeks, we can send it to a compounding pharmacy like [facility] or [facility]. Can you see if she would prefer either of those.

## 2024-02-05 ENCOUNTER — TELEPHONE (OUTPATIENT)
Dept: OBSTETRICS AND GYNECOLOGY | Facility: CLINIC | Age: 74
End: 2024-02-05
Payer: MEDICARE

## 2024-02-05 NOTE — TELEPHONE ENCOUNTER
Called pt again regarding her results she did not answer either number. Left a message for her to return ,my call

## 2024-02-05 NOTE — TELEPHONE ENCOUNTER
----- Message from Caitlyn Feng MD sent at 2/5/2024  2:41 PM CST -----  Can you please reach out to her again about the previous message with her test results.

## 2024-02-07 ENCOUNTER — TELEPHONE (OUTPATIENT)
Dept: OBSTETRICS AND GYNECOLOGY | Facility: CLINIC | Age: 74
End: 2024-02-07
Payer: MEDICARE

## 2024-02-07 NOTE — TELEPHONE ENCOUNTER
Called pt relative and spoke to her daughter regarding her test results she is going to get her medication and talk to pt about taking it.   none

## 2024-02-27 ENCOUNTER — HOSPITAL ENCOUNTER (OUTPATIENT)
Dept: RADIOLOGY | Facility: HOSPITAL | Age: 74
Discharge: HOME | End: 2024-02-27
Attending: OBSTETRICS & GYNECOLOGY
Payer: MEDICARE

## 2024-02-27 DIAGNOSIS — Z12.31 SCREENING MAMMOGRAM FOR BREAST CANCER: ICD-10-CM

## 2024-02-27 PROCEDURE — 77067 SCR MAMMO BI INCL CAD: CPT | Mod: TC

## 2024-02-27 PROCEDURE — 77067 SCR MAMMO BI INCL CAD: CPT | Mod: 26,,, | Performed by: RADIOLOGY

## 2024-02-27 PROCEDURE — 77063 BREAST TOMOSYNTHESIS BI: CPT | Mod: 26,,, | Performed by: RADIOLOGY

## 2024-03-01 NOTE — PATIENT INSTRUCTIONS
Abdominal Pain    Abdominal pain is pain in the stomach or belly area. Everyone has this pain from time to time. In many cases it goes away on its own. But abdominal pain can sometimes be due to a serious problem, such as appendicitis. So its important to know when to seek help.  Causes of abdominal pain  There are many possible causes of abdominal pain. Common causes in adults include:  · Constipation, diarrhea, or gas  · Stomach acid flowing back up into the esophagus (acid reflux or heartburn)  · Severe acid reflux, called GERD (gastroesophageal reflux disease)  · A sore in the lining of the stomach or small intestine (peptic ulcer)  · Inflammation of the gallbladder, liver, or pancreas  · Gallstones or kidney stones  · Appendicitis   · Intestinal blockage   · An internal organ pushing through a muscle or other tissue (hernia)  · Urinary tract infections  · In women, menstrual cramps, fibroids, or endometriosis  · Inflammation or infection of the intestines  Diagnosing the cause of abdominal pain  Your healthcare provider will do a physical exam help find the cause of your pain. If needed, tests will be ordered. Belly pain has many possible causes. So it can be hard to find the reason for your pain. Giving details about your pain can help. Tell your provider where and when you feel the pain, and what makes it better or worse. Also let your provider know if you have other symptoms such as:  · Fever  · Tiredness  · Upset stomach (nausea)  · Vomiting  · Changes in bathroom habits  Treating abdominal pain  Some causes of pain need emergency medical treatment right away. These include appendicitis or a bowel blockage. Other problems can be treated with rest, fluids, or medicines. Your healthcare provider can give you specific instructions for treatment or self-care based on what is causing your pain.  If you have vomiting or diarrhea, sip water or other clear fluids. When you are ready to eat solid foods again,  start with small amounts of easy-to-digest, low-fat foods. These include apple sauce, toast, or crackers.   When to seek medical care  Call 911 or go to the hospital right away if you:  · Cant pass stool and are vomiting  · Are vomiting blood or have bloody diarrhea or black, tarry diarrhea  · Have chest, neck, or shoulder pain  · Feel like you might pass out  · Have pain in your shoulder blades with nausea  · Have sudden, severe belly pain  · Have new, severe pain unlike any you have felt before  · Have a belly that is rigid, hard, and tender to touch  Call your healthcare provider if you have:  · Pain for more than 5 days  · Bloating for more than 2 days  · Diarrhea for more than 5 days  · A fever of 100.4°F (38.0°C) or higher, or as directed by your provider  · Pain that gets worse  · Weight loss for no reason  · Continued lack of appetite  · Blood in your stool  How to prevent abdominal pain  Here are some tips to help prevent abdominal pain:  · Eat smaller amounts of food at one time.  · Avoid greasy, fried, or other high-fat foods.  · Avoid foods that give you gas.  · Exercise regularly.  · Drink plenty of fluids.  To help prevent GERD symptoms:  · Quit smoking.  · Reduce alcohol and certain foods that increase stomach acid.  · Avoid aspirin and over-the-counter pain and fever medicines (NSAIDS or nonsteroidal anti-inflammatory drugs), if possible  · Lose extra weight.  · Finish eating at least 2 hours before you go to bed or lie down.  · Raise the head of your bed.  Date Last Reviewed: 7/1/2016  © 2540-9032 [employer]. [address] [place] [address], [place] [address]. All rights reserved. This information is not intended as a substitute for professional medical care. Always follow your healthcare professional's instructions.         151

## 2024-03-25 PROBLEM — N17.9 ACUTE KIDNEY INJURY: Status: RESOLVED | Noted: 2023-12-20 | Resolved: 2024-03-25

## 2024-03-26 NOTE — PROGRESS NOTES
[facility]  Colorectal Surgery  Progress Note    Patient Name: Azucena Morrison  MRN: [id number]  Admission Date: 7/15/2020  Hospital Length of Stay: 6 days  Attending Physician: Fabiana Valiente MD    Subjective:     Interval History: no acute evetns overnite, NGT in place, no flatus or bm, decrease in ecf drainage    Post-Op Info:  Procedure(s) (LRB):  RESECTION, COLON, LOW ANTERIOR, possible loop ileostomy, ERAS high (N/A)          Medications:  Continuous Infusions:   TPN ADULT CENTRAL LINE CUSTOM 75 mL/hr at 07/20/20 2228    TPN ADULT CENTRAL LINE CUSTOM       Scheduled Meds:   carvediloL  25 mg Oral Daily    enoxaparin  40 mg Subcutaneous Q24H    sodium chloride 0.9%  10 mL Intravenous Q6H     PRN Meds:   benzocaine    dextrose 50%    dextrose 50%    glucagon (human recombinant)    glucagon (human recombinant)    HYDROmorphone    HYDROmorphone    insulin aspart U-100    insulin aspart U-100    ondansetron    promethazine (PHENERGAN) IVPB    sodium chloride 0.9%        Objective:     Vital Signs (Most Recent):  Temp: 98.4 °F (36.9 °C) (07/21/20 0811)  Pulse: 91 (07/21/20 0319)  Resp: 18 (07/21/20 0811)  BP: (!) 143/72 (07/21/20 0811)  SpO2: 96 % (07/21/20 0319) Vital Signs (24h Range):  Temp:  [97.8 °F (36.6 °C)-98.4 °F (36.9 °C)] 98.4 °F (36.9 °C)  Pulse:  [83-91] 91  Resp:  [16-20] 18  SpO2:  [96 %-97 %] 96 %  BP: (140-144)/(72-82) 143/72     Intake/Output - Last 3 Shifts       07/19 0700 - 07/20 0659 07/20 0700 - 07/21 0659 07/21 0700 - 07/22 0659    P.O. 0      I.V. (mL/kg)       NG/GT 60 810     IV Piggyback 50      TPN 1525      Total Intake(mL/kg) 1635 (23.1) 810 (11.5)     Urine (mL/kg/hr) 2050 (1.2) 1800 (1.1)     Drains 750 50     Total Output 2800 1850     Net -1165 -1040                  Physical Exam  Constitutional:       Appearance: She is well-developed. She is not ill-appearing.   HENT:      Head: Normocephalic.   Eyes:      Pupils: Pupils are equal, round,  and reactive to light.   Cardiovascular:      Rate and Rhythm: Normal rate and regular rhythm.      Heart sounds: Normal heart sounds.   Pulmonary:      Effort: Pulmonary effort is normal. No respiratory distress.      Breath sounds: Normal breath sounds. No wheezing or rales.   Abdominal:      Palpations: Abdomen is soft. There is no mass.      Tenderness: There is no guarding or rebound.      Comments: NGT with greenish drainage   Skin:     General: Skin is warm and dry.   Neurological:      Mental Status: She is alert and oriented to person, place, and time.   Psychiatric:         Behavior: Behavior normal.         Thought Content: Thought content normal.         Judgment: Judgment normal.           Significant Labs:  BMP (Last 3 Results):   Recent Labs   Lab 07/18/20  0638 07/19/20  0417 07/20/20  0441   *  136* 93 141*   *  134* 135* 134*   K 3.5  3.5 3.9 4.3   CL 99  99 100 100   CO2 25  25 29 28   BUN 16  16 17 17   CREATININE 0.9  0.9 0.9 0.9   CALCIUM 8.7  8.7 8.4* 8.7   MG 1.8 1.9 2.0     CBC (Last 3 Results):   Recent Labs   Lab 07/19/20  0417 07/20/20  0441 07/21/20  0916   WBC 7.10 7.23 8.67   RBC 3.92* 4.00 4.22   HGB 9.0* 9.3* 10.1*   HCT 29.0* 30.5* 31.7*    337 385*   MCV 74* 76* 75*   MCH 23.0* 23.3* 23.9*   MCHC 31.0* 30.5* 31.9*       Significant Diagnostics:  None    Assessment/Plan:     * Cutaneous fistula  Surgical intervention potentially planned for later in month  Continue TPN  Keep NG to LIMS another day  Pain control  IVF - replace losses    Chronic kidney disease, stage 3  Monitor labs    Essential hypertension  Monitor BP  Home meds    Type 2 diabetes mellitus without complication, with long-term current use of insulin  SSI  Accu check Q6          Eli Paris NP  Colorectal Surgery  [facility]       left total knee arthroplasty with artemoi  [facility] labs send  preprocedure surgical scrub instructions discussed  recent cardiac eval on file

## 2024-06-01 ENCOUNTER — HOSPITAL ENCOUNTER (EMERGENCY)
Facility: HOSPITAL | Age: 74
Discharge: HOME | End: 2024-06-01
Attending: EMERGENCY MEDICINE
Payer: MEDICARE

## 2024-06-01 VITALS
RESPIRATION RATE: 20 BRPM | DIASTOLIC BLOOD PRESSURE: 64 MMHG | HEART RATE: 60 BPM | HEIGHT: 62 IN | SYSTOLIC BLOOD PRESSURE: 144 MMHG | WEIGHT: 179 LBS | TEMPERATURE: 98 F | BODY MASS INDEX: 32.94 KG/M2 | OXYGEN SATURATION: 98 %

## 2024-06-01 DIAGNOSIS — N39.0 URINARY TRACT INFECTION WITHOUT HEMATURIA, SITE UNSPECIFIED: Primary | ICD-10-CM

## 2024-06-01 DIAGNOSIS — R06.02 SHORTNESS OF BREATH: ICD-10-CM

## 2024-06-01 LAB
ALBUMIN SERPL BCP-MCNC: 3.1 G/DL (ref 3.5–5.2)
ALP SERPL-CCNC: 78 U/L (ref 55–135)
ALT SERPL W/O P-5'-P-CCNC: 26 U/L (ref 10–44)
ANION GAP SERPL CALC-SCNC: 11 MMOL/L (ref 8–16)
AST SERPL-CCNC: 36 U/L (ref 10–40)
BACTERIA #/AREA URNS HPF: ABNORMAL /HPF
BASOPHILS # BLD AUTO: 0.03 K/UL (ref 0–0.2)
BASOPHILS NFR BLD: 0.4 % (ref 0–1.9)
BILIRUB SERPL-MCNC: 0.3 MG/DL (ref 0.1–1)
BILIRUB UR QL STRIP: NEGATIVE
BNP SERPL-MCNC: 308 PG/ML (ref 0–99)
BUN SERPL-MCNC: 17 MG/DL (ref 8–23)
CALCIUM SERPL-MCNC: 8.6 MG/DL (ref 8.7–10.5)
CHLORIDE SERPL-SCNC: 104 MMOL/L (ref 95–110)
CLARITY UR: ABNORMAL
CO2 SERPL-SCNC: 22 MMOL/L (ref 23–29)
COLOR UR: YELLOW
CREAT SERPL-MCNC: 1.5 MG/DL (ref 0.5–1.4)
CTP QC/QA: YES
DIFFERENTIAL METHOD BLD: ABNORMAL
EOSINOPHIL # BLD AUTO: 0.1 K/UL (ref 0–0.5)
EOSINOPHIL NFR BLD: 1 % (ref 0–8)
ERYTHROCYTE [DISTWIDTH] IN BLOOD BY AUTOMATED COUNT: 13.4 % (ref 11.5–14.5)
EST. GFR  (NO RACE VARIABLE): 37 ML/MIN/1.73 M^2
GLUCOSE SERPL-MCNC: 241 MG/DL (ref 70–110)
GLUCOSE UR QL STRIP: ABNORMAL
HCT VFR BLD AUTO: 32.5 % (ref 37–48.5)
HGB BLD-MCNC: 11.4 G/DL (ref 12–16)
HGB UR QL STRIP: ABNORMAL
HYALINE CASTS #/AREA URNS LPF: 0 /LPF
IMM GRANULOCYTES # BLD AUTO: 0.03 K/UL (ref 0–0.04)
IMM GRANULOCYTES NFR BLD AUTO: 0.4 % (ref 0–0.5)
KETONES UR QL STRIP: NEGATIVE
LEUKOCYTE ESTERASE UR QL STRIP: ABNORMAL
LYMPHOCYTES # BLD AUTO: 1.5 K/UL (ref 1–4.8)
LYMPHOCYTES NFR BLD: 22 % (ref 18–48)
MCH RBC QN AUTO: 30.2 PG (ref 27–31)
MCHC RBC AUTO-ENTMCNC: 35.1 G/DL (ref 32–36)
MCV RBC AUTO: 86 FL (ref 82–98)
MICROSCOPIC COMMENT: ABNORMAL
MONOCYTES # BLD AUTO: 0.4 K/UL (ref 0.3–1)
MONOCYTES NFR BLD: 5.3 % (ref 4–15)
NEUTROPHILS # BLD AUTO: 4.8 K/UL (ref 1.8–7.7)
NEUTROPHILS NFR BLD: 70.9 % (ref 38–73)
NITRITE UR QL STRIP: NEGATIVE
NRBC BLD-RTO: 0 /100 WBC
PH UR STRIP: 6 [PH] (ref 5–8)
PLATELET # BLD AUTO: 219 K/UL (ref 150–450)
PMV BLD AUTO: 9.1 FL (ref 9.2–12.9)
POTASSIUM SERPL-SCNC: 3.2 MMOL/L (ref 3.5–5.1)
PROT SERPL-MCNC: 6.8 G/DL (ref 6–8.4)
PROT UR QL STRIP: ABNORMAL
RBC # BLD AUTO: 3.77 M/UL (ref 4–5.4)
RBC #/AREA URNS HPF: 7 /HPF (ref 0–4)
SARS-COV-2 RDRP RESP QL NAA+PROBE: NEGATIVE
SODIUM SERPL-SCNC: 137 MMOL/L (ref 136–145)
SP GR UR STRIP: 1.02 (ref 1–1.03)
SQUAMOUS #/AREA URNS HPF: 7 /HPF
TROPONIN I SERPL DL<=0.01 NG/ML-MCNC: 0.02 NG/ML (ref 0–0.03)
URN SPEC COLLECT METH UR: ABNORMAL
UROBILINOGEN UR STRIP-ACNC: NEGATIVE EU/DL
WBC # BLD AUTO: 6.76 K/UL (ref 3.9–12.7)
WBC #/AREA URNS HPF: 87 /HPF (ref 0–5)

## 2024-06-01 PROCEDURE — 87086 URINE CULTURE/COLONY COUNT: CPT | Performed by: EMERGENCY MEDICINE

## 2024-06-01 PROCEDURE — 87635 SARS-COV-2 COVID-19 AMP PRB: CPT | Performed by: EMERGENCY MEDICINE

## 2024-06-01 PROCEDURE — 63600175 PHARM REV CODE 636 W HCPCS: Performed by: EMERGENCY MEDICINE

## 2024-06-01 PROCEDURE — 84484 ASSAY OF TROPONIN QUANT: CPT | Performed by: EMERGENCY MEDICINE

## 2024-06-01 PROCEDURE — 87186 SC STD MICRODIL/AGAR DIL: CPT | Performed by: EMERGENCY MEDICINE

## 2024-06-01 PROCEDURE — 81000 URINALYSIS NONAUTO W/SCOPE: CPT | Performed by: EMERGENCY MEDICINE

## 2024-06-01 PROCEDURE — 87077 CULTURE AEROBIC IDENTIFY: CPT | Performed by: EMERGENCY MEDICINE

## 2024-06-01 PROCEDURE — 93010 ELECTROCARDIOGRAM REPORT: CPT | Mod: ,,, | Performed by: INTERNAL MEDICINE

## 2024-06-01 PROCEDURE — 83880 ASSAY OF NATRIURETIC PEPTIDE: CPT | Performed by: EMERGENCY MEDICINE

## 2024-06-01 PROCEDURE — 96372 THER/PROPH/DIAG INJ SC/IM: CPT | Performed by: EMERGENCY MEDICINE

## 2024-06-01 PROCEDURE — 80053 COMPREHEN METABOLIC PANEL: CPT | Performed by: EMERGENCY MEDICINE

## 2024-06-01 PROCEDURE — 93005 ELECTROCARDIOGRAM TRACING: CPT

## 2024-06-01 PROCEDURE — 85025 COMPLETE CBC W/AUTO DIFF WBC: CPT | Performed by: EMERGENCY MEDICINE

## 2024-06-01 PROCEDURE — 99285 EMERGENCY DEPT VISIT HI MDM: CPT | Mod: 25

## 2024-06-01 PROCEDURE — 87088 URINE BACTERIA CULTURE: CPT | Performed by: EMERGENCY MEDICINE

## 2024-06-01 RX ORDER — KETOROLAC TROMETHAMINE 30 MG/ML
30 INJECTION, SOLUTION INTRAMUSCULAR; INTRAVENOUS
Status: COMPLETED | OUTPATIENT
Start: 2024-06-01 | End: 2024-06-01

## 2024-06-01 RX ORDER — CEPHALEXIN 500 MG/1
500 CAPSULE ORAL 3 TIMES DAILY
Qty: 20 CAPSULE | Refills: 0 | Status: SHIPPED | OUTPATIENT
Start: 2024-06-01 | End: 2024-06-06 | Stop reason: ALTCHOICE

## 2024-06-01 RX ORDER — KETOROLAC TROMETHAMINE 30 MG/ML
15 INJECTION, SOLUTION INTRAMUSCULAR; INTRAVENOUS
Status: DISCONTINUED | OUTPATIENT
Start: 2024-06-01 | End: 2024-06-01

## 2024-06-01 RX ADMIN — KETOROLAC TROMETHAMINE 30 MG: 30 INJECTION, SOLUTION INTRAMUSCULAR at 12:06

## 2024-06-01 NOTE — ED NOTES
Pt to be discharged home in care of family. Discharge instructions and prescription medications given to the pt. Pt and family at bedside verbalized understanding. Pt reported improved pain since administration of medication. Repeat vitals WDL. Pt left ED ambulatory with family to ride awaiting outside.

## 2024-06-01 NOTE — ED PROVIDER NOTES
Encounter Date: 2024       History     Chief Complaint   Patient presents with    Shortness of Breath     Pt reports shortness of breath x 2 weeks, worse with exertion. Patient is also complaining of straining when using the restroom and pain to her rectal area.     Patient is a 73-year-old shortness of breath over the past couple of weeks.  This has been present for the past couple of weeks.  She denies fever, cough or chest pain. She was also had occasional pelvic pain as well as pain from hemorrhoids.   She was prescribed medication for hemorrhoids but says insurance would not cover it.      Review of patient's allergies indicates:   Allergen Reactions    Chlorhexidine gluconate Rash     Chlorhexidine/alcohol wipes. Rash and blistering.     Past Medical History:   Diagnosis Date    Chronic kidney disease, stage 3     Diabetes mellitus     Diabetes mellitus, type 2     Hematuria, unspecified     Hypercalcemia     Hypertension     Malnutrition of moderate degree 2020    Mixed hyperlipidemia 2022    Proteinuria     Renal cyst, left     UTI (urinary tract infection) 2020     Past Surgical History:   Procedure Laterality Date    ABDOMINAL SURGERY      CATHETERIZATION OF URETER Bilateral 2020    Procedure: CATHETERIZATION, URETER;  Surgeon: Kvng Cunningham MD;  Location: [address];  Service: Urology;  Laterality: Bilateral;     SECTION, CLASSIC      x2    CHOLECYSTECTOMY      CLOSURE, ILEOSTOMY, OPEN N/A 2021    Procedure: CLOSURE, ILEOSTOMY, OPEN, ERAS low;  Surgeon: Fabiana Valiente MD;  Location: [address];  Service: Colon and Rectal;  Laterality: N/A;    COLON SURGERY      \[facility] [place] [facility]\""    COLONOSCOPY N/A 2018    Procedure: COLONOSCOPY;  Surgeon: Gibran Aguayo MD;  Location: [facility] ENDO;  Service: Endoscopy;  Laterality: N/A;    COLOSTOMY Left     CYSTOSCOPY N/A 2020    Procedure: CYSTOSCOPY;  Surgeon: Kvng Cunningham MD;  Location: [facility]  OR 2ND FLR;  Service: Urology;  Laterality: N/A;    CYSTOSCOPY WITH URETEROSCOPY, RETROGRADE PYELOGRAPHY, AND INSERTION OF STENT Left 2/22/2019    Procedure: CYSTOSCOPY, WITH RETROGRADE PYELOGRAM AND URETERAL STENT INSERTION with placement of a muir cath;  Surgeon: Ulisses Horton MD;  Location: [facility];  Service: General;  Laterality: Left;    FLEXIBLE SIGMOIDOSCOPY N/A 2/9/2021    Procedure: SIGMOIDOSCOPY, FLEXIBLE;  Surgeon: Fabiana Valiente MD;  Location: [facility] OR 2ND FLR;  Service: Colon and Rectal;  Laterality: N/A;    HYSTEROSCOPY WITH DILATION AND CURETTAGE OF UTERUS N/A 12/9/2020    Procedure: HYSTEROSCOPY, WITH DILATION AND CURETTAGE OF UTERUS (MYOSURE);  Surgeon: Caitlyn Feng MD;  Location: [facility] OR;  Service: OB/GYN;  Laterality: N/A;  Guido notified 11/30,   video    HYSTEROSCOPY WITH DILATION AND CURETTAGE OF UTERUS N/A 3/4/2022    Procedure: HYSTEROSCOPY, WITH DILATION AND CURETTAGE OF UTERUS;  Surgeon: Caitlyn Feng MD;  Location: [facility] OR;  Service: OB/GYN;  Laterality: N/A;  Alexander notified CW 3/3  video confirmed    ILEOSTOMY  7/31/2020    Procedure: CREATION, ILEOSTOMY;  Surgeon: Fabiana Valiente MD;  Location: [facility] OR 2ND FLR;  Service: Colon and Rectal;;    INCISION AND DRAINAGE OF ABSCESS N/A 12/22/2019    Procedure: INCISION AND DRAINAGE, ABSCESS;  Surgeon: Ulisses Horton MD;  Location: [facility] OR;  Service: General;  Laterality: N/A;    INCISION OF ABDOMINAL WALL N/A 8/10/2018    Procedure: INCISION, ABDOMINAL WALL;  Surgeon: Ulisses Horton MD;  Location: [facility] OR;  Service: General;  Laterality: N/A;    INCISIONAL HERNIA REPAIR Right 2010    LOW ANTERIOR RESECTION OF COLON N/A 7/31/2020    Procedure: RESECTION, COLON, LOW ANTERIOR;  Surgeon: Fabiana Valiente MD;  Location: [facility] OR 2ND FLR;  Service: Colon and Rectal;  Laterality: N/A;    LYSIS OF ADHESIONS  7/31/2020    Procedure: LYSIS, ADHESIONS;  Surgeon: Fabiana Valiente MD;  Location: [facility] OR [address] [place] [address];  Service:  Colon and Rectal;;    SURGICAL REMOVAL OF MASS OF LOWER EXTREMITY Bilateral 10/13/2023    Procedure: EXCISION, MASS ruthy ankle Malleolus;  Surgeon: Ulisses Horton MD;  Location: [facility] OR;  Service: General;  Laterality: Bilateral;     Family History   Problem Relation Name Age of Onset    Stroke Mother      Heart disease Mother      Hyperlipidemia Mother      Hypertension Mother      Alcohol abuse Father      Stroke Sister      Diabetes Sister      Heart disease Sister      Hyperlipidemia Sister      Hypertension Sister      Diabetes Brother      Early death Grandchild      Anesthesia problems Neg Hx      Broken bones Neg Hx      Cancer Neg Hx       Social History     Tobacco Use    Smoking status: Former     Current packs/day: 0.00     Types: Cigarettes     Quit date: 2000     Years since quittin.4    Smokeless tobacco: Never   Substance Use Topics    Alcohol use: No    Drug use: No     Review of Systems   Constitutional:  Negative for fever.   Respiratory:  Positive for shortness of breath.    All other systems reviewed and are negative.      Physical Exam     Initial Vitals [24 1008]   BP Pulse Resp Temp SpO2   (!) 189/88 82 17 98 °F (36.7 °C) 97 %      MAP       --         Physical Exam    Nursing note and vitals reviewed.  Constitutional: No distress.   HENT:   Head: Normocephalic and atraumatic.   Eyes: EOM are normal.   Neck: Neck supple.   Normal range of motion.  Cardiovascular:  Normal rate and regular rhythm.           Pulmonary/Chest: Breath sounds normal.   Abdominal: Abdomen is soft. There is no abdominal tenderness. There is no rebound and no guarding.   Musculoskeletal:         General: Normal range of motion.      Cervical back: Normal range of motion and neck supple.     Neurological: She is alert and oriented to person, place, and time.   Skin: Skin is warm and dry.   Psychiatric: She has a normal mood and affect.         ED Course   Procedures  Labs Reviewed   CBC W/ AUTO  DIFFERENTIAL - Abnormal; Notable for the following components:       Result Value    RBC 3.77 (*)     Hemoglobin 11.4 (*)     Hematocrit 32.5 (*)     MPV 9.1 (*)     All other components within normal limits   COMPREHENSIVE METABOLIC PANEL - Abnormal; Notable for the following components:    Potassium 3.2 (*)     CO2 22 (*)     Glucose 241 (*)     Creatinine 1.5 (*)     Calcium 8.6 (*)     Albumin 3.1 (*)     eGFR 37 (*)     All other components within normal limits   B-TYPE NATRIURETIC PEPTIDE - Abnormal; Notable for the following components:     (*)     All other components within normal limits   URINALYSIS - Abnormal; Notable for the following components:    Appearance, UA Hazy (*)     Protein, UA 2+ (*)     Glucose, UA Trace (*)     Occult Blood UA Trace (*)     Leukocytes, UA 3+ (*)     All other components within normal limits   URINALYSIS MICROSCOPIC - Abnormal; Notable for the following components:    RBC, UA 7 (*)     WBC, UA 87 (*)     All other components within normal limits   CULTURE, URINE   CULTURE, URINE   TROPONIN I   SARS-COV-2 RDRP GENE          Imaging Results              X-Ray Chest 1 View (Final result)  Result time 06/01/24 12:59:40      Final result by Kvng Pugh MD (06/01/24 12:59:40)                   Impression:      No convincing evidence of acute cardiopulmonary disease.      Electronically signed by: Kvng Pugh  Date:    06/01/2024  Time:    12:59               Narrative:    EXAMINATION:  XR CHEST 1 VIEW    CLINICAL HISTORY:  Shortness of breath;    TECHNIQUE:  Single frontal view of the chest was performed.    COMPARISON:  Chest radiograph performed 08/05/2022, 12:35 hours.    FINDINGS:  Monitoring leads overlie the chest.  Grossly unchanged cardiomediastinal contours, again noting prominence of central pulmonary vasculature.    No definite focal airspace consolidation.    No definite pneumothorax or large volume pleural effusion.    No acute findings in the  visualized abdomen.  Osseous and soft tissue structures appear without definite acute change.                                       Medications   ketorolac injection 30 mg (30 mg Intramuscular Given 6/1/24 1243)     Medical Decision Making  DDx :  including but not limited to :  bronchitis, pneumonia, pleural effusion, COVID-19 infection.      Emergent evaluation of a 73-year-old female who has been experiencing some shortness of breath lately predominantly with exertion.  Her lungs are clear to auscultation with oxygen saturations of % on room air.  Chest x-ray is unremarkable. Urinalysis with signs of urinary tract infection which I will treat with Keflex.  I can find no specific etiology for the patient's intermittent shortness of breath.  She has ambulated around the nurse's station, breathing comfortably with a continue oxygen saturation of 98%.  I have urged close follow up with the primary physician for recheck but most importantly to return to th    Amount and/or Complexity of Data Reviewed  Labs: ordered.     Details: CBC with a hemoglobin 11.1 and hematocrit 32.5.  CMP with a glucose of 241, creatinine of 1.5.  Urinalysis with 87 WBC.  Radiology: ordered.     Details:   Chest x-ray without acute cardiopulmonary abnormality.    Risk  Prescription drug management.      Additional MDM:     Well's Criteria Score:  -Clinical symptoms of DVT (leg swelling, pain with palpation) = 0.0  -PE is #1 diagnosis OR equally likely =            0.0  -Heart Rate >100 =   0.0  -Immobilization (= or > than 3 days) or surgery in the previous 4 weeks = 0.0  -Previous DVT/PE = 0.0  -Hemoptysis =          0.0  -Malignancy =           0.0  Well's Probability Score =    0                                     Clinical Impression:  Final diagnoses:  [R06.02] Shortness of breath  [N39.0] Urinary tract infection without hematuria, site unspecified (Primary)          ED Disposition Condition    Discharge Stable          ED  Prescriptions       Medication Sig Dispense Start Date End Date Auth. Provider    cephALEXin (KEFLEX) 500 MG capsule Take 1 capsule (500 mg total) by mouth 3 (three) times daily. 20 capsule 6/1/2024 -- Robbie Cruz MD          Follow-up Information       Follow up With Specialties Details Why Contact Info    Pj Sanches MD Internal Medicine Schedule an appointment as soon as possible for a visit   [address] [place] [address] [phone]      [place] - Emergency Dept Emergency Medicine  If symptoms worsen [address] [facility] [address] [facility] [address] [phone]             Robbie Cruz MD  06/02/24 0608

## 2024-06-01 NOTE — ED NOTES
Home walk test performed for need for O2. Pt passed, there were no desaturations noted while ambulating on room air per ED tech Denise.

## 2024-06-01 NOTE — LETTER
June 6, 2024    Azucena Morrison  [address] [place] [address]                   [address]  [place] [address]  Phone: [phone]  Fax: [phone]   Varios intentos de comunicarnos con usted a través del número de teléfono proporcionado no tuvieron éxito. Esta correspondencia escrita es para informarle que los resultados de las pruebas de herbert reciente visita al [facility] arrojan resultados anormales.     Llame al [phone] y solicite hablar con un proveedor para obtener más información.    Kristi,   Personal del [facility]  [facility]

## 2024-06-04 LAB
BACTERIA UR CULT: ABNORMAL
OHS QRS DURATION: 84 MS
OHS QTC CALCULATION: 438 MS

## 2024-06-06 RX ORDER — CIPROFLOXACIN 500 MG/1
500 TABLET ORAL 2 TIMES DAILY
Qty: 14 TABLET | Refills: 0 | Status: SHIPPED | OUTPATIENT
Start: 2024-06-06 | End: 2024-06-13

## 2024-07-09 ENCOUNTER — TELEPHONE (OUTPATIENT)
Dept: OBSTETRICS AND GYNECOLOGY | Facility: CLINIC | Age: 74
End: 2024-07-09
Payer: MEDICARE

## 2024-10-24 ENCOUNTER — TELEPHONE (OUTPATIENT)
Dept: OBSTETRICS AND GYNECOLOGY | Facility: CLINIC | Age: 74
End: 2024-10-24

## 2024-10-24 ENCOUNTER — OFFICE VISIT (OUTPATIENT)
Dept: OBSTETRICS AND GYNECOLOGY | Facility: CLINIC | Age: 74
End: 2024-10-24
Payer: MEDICARE

## 2024-10-24 VITALS
DIASTOLIC BLOOD PRESSURE: 81 MMHG | WEIGHT: 169.63 LBS | HEART RATE: 71 BPM | SYSTOLIC BLOOD PRESSURE: 124 MMHG | BODY MASS INDEX: 31.03 KG/M2

## 2024-10-24 DIAGNOSIS — L72.3 SEBACEOUS CYST: Primary | ICD-10-CM

## 2024-10-24 PROCEDURE — 99999 PR PBB SHADOW E&M-EST. PATIENT-LVL III: CPT | Mod: PBBFAC,,, | Performed by: OBSTETRICS & GYNECOLOGY

## 2024-10-24 RX ORDER — MUPIROCIN 20 MG/G
OINTMENT TOPICAL 3 TIMES DAILY
Qty: 30 G | Refills: 0 | Status: SHIPPED | OUTPATIENT
Start: 2024-10-24

## 2024-10-24 NOTE — TELEPHONE ENCOUNTER
I just finished her note and sent it in so they will likely need to give it an hour or so and it should be ready    36.6

## 2024-10-24 NOTE — TELEPHONE ENCOUNTER
Please advise on antibiotics      ----- Message from Paulette sent at 10/24/2024 12:08 PM CDT -----  [phone]  Type: Patient Call Back    Who called: Azucena ( daughter in law)     What is the request in detail: stated the pharmacy does not have the medication she was told to .     Can the clinic reply by [facility]? no    Would the patient rather a call back or a response via [facility]?  Call back     Best call back number: [phone] ( her work)

## 2024-10-24 NOTE — PROGRESS NOTES
Chief Complaint   Patient presents with    Mass    pain       HPI:   Azucena Morrison 74 y.o.  is here for sebaceous cysts. She has h/o of them and noticed more. They are causing her irritation  and would like them removed.      No LMP recorded (lmp unknown). Patient is postmenopausal.     Past Medical History:   Diagnosis Date    Chronic kidney disease, stage 3     Diabetes mellitus     Diabetes mellitus, type 2     Hematuria, unspecified     Hypercalcemia     Hypertension     Malnutrition of moderate degree 2020    Mixed hyperlipidemia 2022    Proteinuria     Renal cyst, left     UTI (urinary tract infection) 2020       Past Surgical History:   Procedure Laterality Date    ABDOMINAL SURGERY      CATHETERIZATION OF URETER Bilateral 2020    Procedure: CATHETERIZATION, URETER;  Surgeon: Kvng Cunningham MD;  Location: [facility] OR [address] [place] [address];  Service: Urology;  Laterality: Bilateral;     SECTION, CLASSIC      x2    CHOLECYSTECTOMY      CLOSURE, ILEOSTOMY, OPEN N/A 2021    Procedure: CLOSURE, ILEOSTOMY, OPEN, ERAS low;  Surgeon: Fabiana Valiente MD;  Location: [facility] OR [address] [place] [address];  Service: Colon and Rectal;  Laterality: N/A;    COLON SURGERY      [facility]    COLONOSCOPY N/A 2018    Procedure: COLONOSCOPY;  Surgeon: Gibran Aguayo MD;  Location: [facility];  Service: Endoscopy;  Laterality: N/A;    COLOSTOMY Left     CYSTOSCOPY N/A 2020    Procedure: CYSTOSCOPY;  Surgeon: Kvng Cunningham MD;  [facility];  Service: Urology;  Laterality: N/A;    CYSTOSCOPY WITH URETEROSCOPY, RETROGRADE PYELOGRAPHY, AND INSERTION OF STENT Left 2019    Procedure: CYSTOSCOPY, WITH RETROGRADE PYELOGRAM AND URETERAL STENT INSERTION with placement of a muir cath;  Surgeon: Ulisses Horton MD;  Location: [facility] OR;  Service: General;  Laterality: Left;    FLEXIBLE SIGMOIDOSCOPY N/A 2021    Procedure: SIGMOIDOSCOPY, FLEXIBLE;  Surgeon: Fabiana Valiente  MD;  [facility];  Service: Colon and Rectal;  Laterality: N/A;    HYSTEROSCOPY WITH DILATION AND CURETTAGE OF UTERUS N/A 12/9/2020    Procedure: HYSTEROSCOPY, WITH DILATION AND CURETTAGE OF UTERUS (MYOSURE);  Surgeon: Caitlyn Feng MD;  Location: [facility] OR;  Service: OB/GYN;  Laterality: N/A;  Guido notified 11/30, EF  video    HYSTEROSCOPY WITH DILATION AND CURETTAGE OF UTERUS N/A 3/4/2022    Procedure: HYSTEROSCOPY, WITH DILATION AND CURETTAGE OF UTERUS;  Surgeon: Caitlyn Feng MD;  Location: [facility] OR;  Service: OB/GYN;  Laterality: N/A;  Alexanedr notified CW 3/3  video confirmed    ILEOSTOMY  7/31/2020    Procedure: CREATION, ILEOSTOMY;  Surgeon: Fabiana Valiente MD;  [facility];  Service: Colon and Rectal;;    INCISION AND DRAINAGE OF ABSCESS N/A 12/22/2019    Procedure: INCISION AND DRAINAGE, ABSCESS;  Surgeon: Ulisses Horton MD;  Location: [facility] OR;  Service: General;  Laterality: N/A;    INCISION OF ABDOMINAL WALL N/A 8/10/2018    Procedure: INCISION, ABDOMINAL WALL;  Surgeon: Ulisses Horton MD;  Location: [facility] OR;  Service: General;  Laterality: N/A;    INCISIONAL HERNIA REPAIR Right 2010    LOW ANTERIOR RESECTION OF COLON N/A 7/31/2020    Procedure: RESECTION, COLON, LOW ANTERIOR;  Surgeon: Fabiana Valiente MD;  [facility];  Service: Colon and Rectal;  Laterality: N/A;    LYSIS OF ADHESIONS  7/31/2020    Procedure: LYSIS, ADHESIONS;  Surgeon: Fabiana Valiente MD;  [facility];  Service: Colon and Rectal;;    SURGICAL REMOVAL OF MASS OF LOWER EXTREMITY Bilateral 10/13/2023    Procedure: EXCISION, MASS ruthy ankle Malleolus;  Surgeon: Ulisses Horton MD;  Location: [facility];  Service: General;  Laterality: Bilateral;       Family History   Problem Relation Name Age of Onset    Stroke Mother      Heart disease Mother      Hyperlipidemia Mother      Hypertension Mother      Alcohol abuse Father      Stroke Sister      Diabetes Sister       Heart disease Sister      Hyperlipidemia Sister      Hypertension Sister      Diabetes Brother      Early death Grandchild      Anesthesia problems Neg Hx      Broken bones Neg Hx      Cancer Neg Hx         Social History     Socioeconomic History    Marital status: Single   Tobacco Use    Smoking status: Former     Current packs/day: 0.00     Types: Cigarettes     Quit date: 2000     Years since quittin.8    Smokeless tobacco: Never   Substance and Sexual Activity    Alcohol use: No    Drug use: No    Sexual activity: Not Currently       OB History          2    Para   2    Term   2            AB        Living   2         SAB        IAB        Ectopic        Multiple        Live Births   2                        ROS:    All other ROS negative     PE:   /81   Pulse 71   Wt 76.9 kg (169 lb 10.3 oz)   LMP  (LMP Unknown)   BMI 31.03 kg/m²     APPEARANCE: Well nourished, well developed, in no acute distress.  .   PELVIC:   EXTERNAL GENITALIA/VULVA: 1 cm left vulvar sebaceous cyst and 1 cm right sebaceous cyst in right labia majora, close to introitus. Normal female genitalia.    10/24/2024  Procedure: sebaceous cyst excision  Complications: none   Condition stable: EBL: 1cc  Procedure: area cleansed with iodine and area on right and left labia majora numbed with 1cc of lidocaine with epi and sebaceous cyst excised and area closed with 4-0 monocryl on SH.         1. Sebaceous cyst        Plan:  Excised sebaceous cyst, discussed risk of recurrence. RTC 1 week          Face to Face time with patient: 30 minutes of total time spent on the encounter, which includes face to face time and non-face to face time preparing to see the patient (eg, review of tests), Obtaining and/or reviewing separately obtained history, Documenting clinical information in the electronic or other health record, Independently interpreting results (not separately reported) and communicating results to the  patient/family/caregiver, or Care coordination (not separately reported).

## 2024-10-27 ENCOUNTER — HOSPITAL ENCOUNTER (INPATIENT)
Facility: HOSPITAL | Age: 74
LOS: 1 days | Discharge: HOME HEALTH | DRG: 683 | End: 2024-10-29
Attending: EMERGENCY MEDICINE | Admitting: INTERNAL MEDICINE
Payer: MEDICARE

## 2024-10-27 DIAGNOSIS — E11.9 TYPE 2 DIABETES MELLITUS WITHOUT COMPLICATION, WITH LONG-TERM CURRENT USE OF INSULIN: Chronic | ICD-10-CM

## 2024-10-27 DIAGNOSIS — I48.91 ATRIAL FIBRILLATION, UNSPECIFIED TYPE: ICD-10-CM

## 2024-10-27 DIAGNOSIS — R80.1 PERSISTENT PROTEINURIA: ICD-10-CM

## 2024-10-27 DIAGNOSIS — E11.65 POORLY CONTROLLED DIABETES MELLITUS: Primary | ICD-10-CM

## 2024-10-27 DIAGNOSIS — N18.31 STAGE 3A CHRONIC KIDNEY DISEASE: ICD-10-CM

## 2024-10-27 DIAGNOSIS — Z79.4 TYPE 2 DIABETES MELLITUS WITHOUT COMPLICATION, WITH LONG-TERM CURRENT USE OF INSULIN: Chronic | ICD-10-CM

## 2024-10-27 DIAGNOSIS — N17.9 AKI (ACUTE KIDNEY INJURY): ICD-10-CM

## 2024-10-27 LAB
ALBUMIN SERPL BCP-MCNC: 4 G/DL (ref 3.5–5.2)
ALP SERPL-CCNC: 54 U/L (ref 40–150)
ALT SERPL W/O P-5'-P-CCNC: 19 U/L (ref 10–44)
ANION GAP SERPL CALC-SCNC: 13 MMOL/L (ref 8–16)
AST SERPL-CCNC: 21 U/L (ref 10–40)
BACTERIA #/AREA URNS HPF: NORMAL /HPF
BASOPHILS # BLD AUTO: 0.03 K/UL (ref 0–0.2)
BASOPHILS NFR BLD: 0.4 % (ref 0–1.9)
BILIRUB SERPL-MCNC: 0.3 MG/DL (ref 0.1–1)
BILIRUB UR QL STRIP: NEGATIVE
BUN SERPL-MCNC: 31 MG/DL (ref 8–23)
CALCIUM SERPL-MCNC: 10.4 MG/DL (ref 8.7–10.5)
CHLORIDE SERPL-SCNC: 101 MMOL/L (ref 95–110)
CLARITY UR: CLEAR
CO2 SERPL-SCNC: 21 MMOL/L (ref 23–29)
COLOR UR: COLORLESS
CREAT SERPL-MCNC: 2.1 MG/DL (ref 0.5–1.4)
DIFFERENTIAL METHOD BLD: ABNORMAL
EOSINOPHIL # BLD AUTO: 0.1 K/UL (ref 0–0.5)
EOSINOPHIL NFR BLD: 1 % (ref 0–8)
ERYTHROCYTE [DISTWIDTH] IN BLOOD BY AUTOMATED COUNT: 13.2 % (ref 11.5–14.5)
EST. GFR  (NO RACE VARIABLE): 24 ML/MIN/1.73 M^2
ESTIMATED AVG GLUCOSE: 283 MG/DL (ref 68–131)
GLUCOSE SERPL-MCNC: 334 MG/DL (ref 70–110)
GLUCOSE UR QL STRIP: ABNORMAL
HBA1C MFR BLD: 11.5 % (ref 4–5.6)
HCT VFR BLD AUTO: 41.6 % (ref 37–48.5)
HGB BLD-MCNC: 14.4 G/DL (ref 12–16)
HGB UR QL STRIP: NEGATIVE
IMM GRANULOCYTES # BLD AUTO: 0.02 K/UL (ref 0–0.04)
IMM GRANULOCYTES NFR BLD AUTO: 0.2 % (ref 0–0.5)
KETONES UR QL STRIP: NEGATIVE
LEUKOCYTE ESTERASE UR QL STRIP: NEGATIVE
LYMPHOCYTES # BLD AUTO: 2.4 K/UL (ref 1–4.8)
LYMPHOCYTES NFR BLD: 28.6 % (ref 18–48)
MAGNESIUM SERPL-MCNC: 1.8 MG/DL (ref 1.6–2.6)
MCH RBC QN AUTO: 29.8 PG (ref 27–31)
MCHC RBC AUTO-ENTMCNC: 34.6 G/DL (ref 32–36)
MCV RBC AUTO: 86 FL (ref 82–98)
MICROSCOPIC COMMENT: NORMAL
MONOCYTES # BLD AUTO: 0.5 K/UL (ref 0.3–1)
MONOCYTES NFR BLD: 5.9 % (ref 4–15)
NEUTROPHILS # BLD AUTO: 5.3 K/UL (ref 1.8–7.7)
NEUTROPHILS NFR BLD: 63.9 % (ref 38–73)
NITRITE UR QL STRIP: NEGATIVE
NRBC BLD-RTO: 0 /100 WBC
PH UR STRIP: 6 [PH] (ref 5–8)
PHOSPHATE SERPL-MCNC: 3.8 MG/DL (ref 2.7–4.5)
PLATELET # BLD AUTO: 271 K/UL (ref 150–450)
PMV BLD AUTO: 9.1 FL (ref 9.2–12.9)
POCT GLUCOSE: 301 MG/DL (ref 70–110)
POCT GLUCOSE: 343 MG/DL (ref 70–110)
POCT GLUCOSE: 369 MG/DL (ref 70–110)
POCT GLUCOSE: 383 MG/DL (ref 70–110)
POTASSIUM SERPL-SCNC: 4.2 MMOL/L (ref 3.5–5.1)
PROT SERPL-MCNC: 8.4 G/DL (ref 6–8.4)
PROT UR QL STRIP: ABNORMAL
RBC # BLD AUTO: 4.84 M/UL (ref 4–5.4)
SODIUM SERPL-SCNC: 135 MMOL/L (ref 136–145)
SP GR UR STRIP: 1.02 (ref 1–1.03)
URN SPEC COLLECT METH UR: ABNORMAL
UROBILINOGEN UR STRIP-ACNC: NEGATIVE EU/DL
WBC # BLD AUTO: 8.25 K/UL (ref 3.9–12.7)
YEAST URNS QL MICRO: NORMAL

## 2024-10-27 PROCEDURE — G0378 HOSPITAL OBSERVATION PER HR: HCPCS

## 2024-10-27 PROCEDURE — 63600175 PHARM REV CODE 636 W HCPCS

## 2024-10-27 PROCEDURE — 93005 ELECTROCARDIOGRAM TRACING: CPT

## 2024-10-27 PROCEDURE — 84100 ASSAY OF PHOSPHORUS: CPT | Performed by: EMERGENCY MEDICINE

## 2024-10-27 PROCEDURE — 93010 ELECTROCARDIOGRAM REPORT: CPT | Mod: ,,, | Performed by: INTERNAL MEDICINE

## 2024-10-27 PROCEDURE — 83036 HEMOGLOBIN GLYCOSYLATED A1C: CPT | Performed by: EMERGENCY MEDICINE

## 2024-10-27 PROCEDURE — 96372 THER/PROPH/DIAG INJ SC/IM: CPT

## 2024-10-27 PROCEDURE — 81000 URINALYSIS NONAUTO W/SCOPE: CPT | Performed by: EMERGENCY MEDICINE

## 2024-10-27 PROCEDURE — 25000003 PHARM REV CODE 250

## 2024-10-27 PROCEDURE — 25000003 PHARM REV CODE 250: Performed by: EMERGENCY MEDICINE

## 2024-10-27 PROCEDURE — 85025 COMPLETE CBC W/AUTO DIFF WBC: CPT | Performed by: EMERGENCY MEDICINE

## 2024-10-27 PROCEDURE — 83735 ASSAY OF MAGNESIUM: CPT | Performed by: EMERGENCY MEDICINE

## 2024-10-27 PROCEDURE — 80053 COMPREHEN METABOLIC PANEL: CPT | Performed by: EMERGENCY MEDICINE

## 2024-10-27 RX ORDER — HEPARIN SODIUM 5000 [USP'U]/ML
5000 INJECTION, SOLUTION INTRAVENOUS; SUBCUTANEOUS EVERY 8 HOURS
Status: DISCONTINUED | OUTPATIENT
Start: 2024-10-27 | End: 2024-10-28

## 2024-10-27 RX ORDER — IBUPROFEN 200 MG
24 TABLET ORAL
Status: DISCONTINUED | OUTPATIENT
Start: 2024-10-27 | End: 2024-10-29 | Stop reason: HOSPADM

## 2024-10-27 RX ORDER — ACETAMINOPHEN 325 MG/1
650 TABLET ORAL EVERY 6 HOURS PRN
Status: DISCONTINUED | OUTPATIENT
Start: 2024-10-27 | End: 2024-10-29 | Stop reason: HOSPADM

## 2024-10-27 RX ORDER — SODIUM CHLORIDE 0.9 % (FLUSH) 0.9 %
10 SYRINGE (ML) INJECTION EVERY 12 HOURS PRN
Status: DISCONTINUED | OUTPATIENT
Start: 2024-10-27 | End: 2024-10-29 | Stop reason: HOSPADM

## 2024-10-27 RX ORDER — GLUCAGON 1 MG
1 KIT INJECTION
Status: DISCONTINUED | OUTPATIENT
Start: 2024-10-27 | End: 2024-10-29 | Stop reason: HOSPADM

## 2024-10-27 RX ORDER — POLYETHYLENE GLYCOL 3350 17 G/17G
17 POWDER, FOR SOLUTION ORAL DAILY
Status: DISCONTINUED | OUTPATIENT
Start: 2024-10-28 | End: 2024-10-29 | Stop reason: HOSPADM

## 2024-10-27 RX ORDER — INSULIN GLARGINE 100 [IU]/ML
5 INJECTION, SOLUTION SUBCUTANEOUS ONCE
Status: COMPLETED | OUTPATIENT
Start: 2024-10-27 | End: 2024-10-27

## 2024-10-27 RX ORDER — INSULIN ASPART 100 [IU]/ML
0-10 INJECTION, SOLUTION INTRAVENOUS; SUBCUTANEOUS
Status: DISCONTINUED | OUTPATIENT
Start: 2024-10-27 | End: 2024-10-28

## 2024-10-27 RX ORDER — SODIUM CHLORIDE 9 MG/ML
1000 INJECTION, SOLUTION INTRAVENOUS
Status: COMPLETED | OUTPATIENT
Start: 2024-10-27 | End: 2024-10-27

## 2024-10-27 RX ORDER — PRAVASTATIN SODIUM 40 MG/1
40 TABLET ORAL NIGHTLY
Status: DISCONTINUED | OUTPATIENT
Start: 2024-10-27 | End: 2024-10-29 | Stop reason: HOSPADM

## 2024-10-27 RX ORDER — IBUPROFEN 200 MG
16 TABLET ORAL
Status: DISCONTINUED | OUTPATIENT
Start: 2024-10-27 | End: 2024-10-29 | Stop reason: HOSPADM

## 2024-10-27 RX ORDER — NALOXONE HCL 0.4 MG/ML
0.02 VIAL (ML) INJECTION
Status: DISCONTINUED | OUTPATIENT
Start: 2024-10-27 | End: 2024-10-29 | Stop reason: HOSPADM

## 2024-10-27 RX ADMIN — SODIUM CHLORIDE 1000 ML: 9 INJECTION, SOLUTION INTRAVENOUS at 03:10

## 2024-10-27 RX ADMIN — INSULIN GLARGINE 5 UNITS: 100 INJECTION, SOLUTION SUBCUTANEOUS at 05:10

## 2024-10-27 RX ADMIN — PRAVASTATIN SODIUM 40 MG: 40 TABLET ORAL at 08:10

## 2024-10-27 RX ADMIN — INSULIN ASPART 5 UNITS: 100 INJECTION, SOLUTION INTRAVENOUS; SUBCUTANEOUS at 08:10

## 2024-10-27 RX ADMIN — ACETAMINOPHEN 650 MG: 325 TABLET ORAL at 10:10

## 2024-10-27 RX ADMIN — HEPARIN SODIUM 5000 UNITS: 5000 INJECTION, SOLUTION INTRAVENOUS; SUBCUTANEOUS at 09:10

## 2024-10-27 RX ADMIN — SODIUM CHLORIDE 250 ML: 9 INJECTION, SOLUTION INTRAVENOUS at 02:10

## 2024-10-28 PROBLEM — I48.91 A-FIB: Status: ACTIVE | Noted: 2024-10-28

## 2024-10-28 LAB
ALBUMIN SERPL BCP-MCNC: 3.7 G/DL (ref 3.5–5.2)
ALP SERPL-CCNC: 51 U/L (ref 40–150)
ALT SERPL W/O P-5'-P-CCNC: 15 U/L (ref 10–44)
ANION GAP SERPL CALC-SCNC: 13 MMOL/L (ref 8–16)
ANION GAP SERPL CALC-SCNC: 14 MMOL/L (ref 8–16)
AST SERPL-CCNC: 20 U/L (ref 10–40)
BASOPHILS # BLD AUTO: 0.03 K/UL (ref 0–0.2)
BASOPHILS NFR BLD: 0.4 % (ref 0–1.9)
BILIRUB SERPL-MCNC: 0.4 MG/DL (ref 0.1–1)
BUN SERPL-MCNC: 34 MG/DL (ref 8–23)
BUN SERPL-MCNC: 35 MG/DL (ref 8–23)
CALCIUM SERPL-MCNC: 10.3 MG/DL (ref 8.7–10.5)
CALCIUM SERPL-MCNC: 9.9 MG/DL (ref 8.7–10.5)
CHLORIDE SERPL-SCNC: 100 MMOL/L (ref 95–110)
CHLORIDE SERPL-SCNC: 104 MMOL/L (ref 95–110)
CHLORIDE UR-SCNC: 78 MMOL/L (ref 25–200)
CO2 SERPL-SCNC: 18 MMOL/L (ref 23–29)
CO2 SERPL-SCNC: 23 MMOL/L (ref 23–29)
CREAT SERPL-MCNC: 2.2 MG/DL (ref 0.5–1.4)
CREAT SERPL-MCNC: 2.2 MG/DL (ref 0.5–1.4)
CREAT UR-MCNC: 45.8 MG/DL (ref 15–325)
DIFFERENTIAL METHOD BLD: NORMAL
EOSINOPHIL # BLD AUTO: 0.2 K/UL (ref 0–0.5)
EOSINOPHIL NFR BLD: 2.1 % (ref 0–8)
ERYTHROCYTE [DISTWIDTH] IN BLOOD BY AUTOMATED COUNT: 13.5 % (ref 11.5–14.5)
EST. GFR  (NO RACE VARIABLE): 23 ML/MIN/1.73 M^2
EST. GFR  (NO RACE VARIABLE): 23 ML/MIN/1.73 M^2
GLUCOSE SERPL-MCNC: 293 MG/DL (ref 70–110)
GLUCOSE SERPL-MCNC: 359 MG/DL (ref 70–110)
HCT VFR BLD AUTO: 41.3 % (ref 37–48.5)
HGB BLD-MCNC: 14 G/DL (ref 12–16)
IMM GRANULOCYTES # BLD AUTO: 0.02 K/UL (ref 0–0.04)
IMM GRANULOCYTES NFR BLD AUTO: 0.3 % (ref 0–0.5)
LYMPHOCYTES # BLD AUTO: 2.3 K/UL (ref 1–4.8)
LYMPHOCYTES NFR BLD: 32.3 % (ref 18–48)
MCH RBC QN AUTO: 29.4 PG (ref 27–31)
MCHC RBC AUTO-ENTMCNC: 33.9 G/DL (ref 32–36)
MCV RBC AUTO: 87 FL (ref 82–98)
MONOCYTES # BLD AUTO: 0.4 K/UL (ref 0.3–1)
MONOCYTES NFR BLD: 5.8 % (ref 4–15)
NEUTROPHILS # BLD AUTO: 4.3 K/UL (ref 1.8–7.7)
NEUTROPHILS NFR BLD: 59.1 % (ref 38–73)
NRBC BLD-RTO: 0 /100 WBC
PLATELET # BLD AUTO: 282 K/UL (ref 150–450)
PMV BLD AUTO: 9.9 FL (ref 9.2–12.9)
POCT GLUCOSE: 160 MG/DL (ref 70–110)
POCT GLUCOSE: 209 MG/DL (ref 70–110)
POCT GLUCOSE: 248 MG/DL (ref 70–110)
POCT GLUCOSE: 310 MG/DL (ref 70–110)
POCT GLUCOSE: 372 MG/DL (ref 70–110)
POCT GLUCOSE: 392 MG/DL (ref 70–110)
POTASSIUM SERPL-SCNC: 3.8 MMOL/L (ref 3.5–5.1)
POTASSIUM SERPL-SCNC: 4.4 MMOL/L (ref 3.5–5.1)
POTASSIUM UR-SCNC: 43 MMOL/L (ref 15–95)
PROT SERPL-MCNC: 7.8 G/DL (ref 6–8.4)
RBC # BLD AUTO: 4.76 M/UL (ref 4–5.4)
SODIUM SERPL-SCNC: 136 MMOL/L (ref 136–145)
SODIUM SERPL-SCNC: 136 MMOL/L (ref 136–145)
SODIUM UR-SCNC: 70 MMOL/L (ref 20–250)
SODIUM UR-SCNC: 70 MMOL/L (ref 20–250)
T4 FREE SERPL-MCNC: 1.19 NG/DL (ref 0.71–1.51)
TSH SERPL DL<=0.005 MIU/L-ACNC: 0.03 UIU/ML (ref 0.4–4)
UUN UR-MCNC: 364 MG/DL (ref 140–1050)
WBC # BLD AUTO: 7.25 K/UL (ref 3.9–12.7)

## 2024-10-28 PROCEDURE — 84443 ASSAY THYROID STIM HORMONE: CPT

## 2024-10-28 PROCEDURE — 84300 ASSAY OF URINE SODIUM: CPT

## 2024-10-28 PROCEDURE — 85025 COMPLETE CBC W/AUTO DIFF WBC: CPT

## 2024-10-28 PROCEDURE — 21400001 HC TELEMETRY ROOM

## 2024-10-28 PROCEDURE — 84540 ASSAY OF URINE/UREA-N: CPT

## 2024-10-28 PROCEDURE — 93010 ELECTROCARDIOGRAM REPORT: CPT | Mod: ,,, | Performed by: INTERNAL MEDICINE

## 2024-10-28 PROCEDURE — 36415 COLL VENOUS BLD VENIPUNCTURE: CPT

## 2024-10-28 PROCEDURE — 25000003 PHARM REV CODE 250

## 2024-10-28 PROCEDURE — 63600175 PHARM REV CODE 636 W HCPCS

## 2024-10-28 PROCEDURE — 96372 THER/PROPH/DIAG INJ SC/IM: CPT

## 2024-10-28 PROCEDURE — 80053 COMPREHEN METABOLIC PANEL: CPT

## 2024-10-28 PROCEDURE — 25000242 PHARM REV CODE 250 ALT 637 W/ HCPCS

## 2024-10-28 PROCEDURE — 82436 ASSAY OF URINE CHLORIDE: CPT | Performed by: INTERNAL MEDICINE

## 2024-10-28 PROCEDURE — 99223 1ST HOSP IP/OBS HIGH 75: CPT | Mod: 25,GC,, | Performed by: INTERNAL MEDICINE

## 2024-10-28 PROCEDURE — 93005 ELECTROCARDIOGRAM TRACING: CPT

## 2024-10-28 PROCEDURE — 82570 ASSAY OF URINE CREATININE: CPT

## 2024-10-28 PROCEDURE — 80048 BASIC METABOLIC PNL TOTAL CA: CPT | Mod: XB

## 2024-10-28 PROCEDURE — 84133 ASSAY OF URINE POTASSIUM: CPT | Performed by: INTERNAL MEDICINE

## 2024-10-28 PROCEDURE — 25000003 PHARM REV CODE 250: Performed by: INTERNAL MEDICINE

## 2024-10-28 PROCEDURE — 84439 ASSAY OF FREE THYROXINE: CPT

## 2024-10-28 PROCEDURE — 36415 COLL VENOUS BLD VENIPUNCTURE: CPT | Mod: XB

## 2024-10-28 RX ORDER — GLUCOSAM/CHON-MSM1/C/MANG/BOSW 500-416.6
1 TABLET ORAL 3 TIMES DAILY
Qty: 200 EACH | Refills: 11 | Status: SHIPPED | OUTPATIENT
Start: 2024-10-28 | End: 2025-10-28

## 2024-10-28 RX ORDER — PEN NEEDLE, DIABETIC 32GX 5/32"
1 NEEDLE, DISPOSABLE MISCELLANEOUS 3 TIMES DAILY
Qty: 200 EACH | Refills: 11 | Status: SHIPPED | OUTPATIENT
Start: 2024-10-28

## 2024-10-28 RX ORDER — INSULIN GLARGINE 100 [IU]/ML
20 INJECTION, SOLUTION SUBCUTANEOUS 2 TIMES DAILY
Qty: 15 ML | Refills: 11 | Status: SHIPPED | OUTPATIENT
Start: 2024-10-28 | End: 2024-10-29

## 2024-10-28 RX ORDER — INSULIN GLARGINE 100 [IU]/ML
10 INJECTION, SOLUTION SUBCUTANEOUS ONCE
Status: COMPLETED | OUTPATIENT
Start: 2024-10-28 | End: 2024-10-28

## 2024-10-28 RX ORDER — INSULIN GLARGINE 100 [IU]/ML
15 INJECTION, SOLUTION SUBCUTANEOUS 2 TIMES DAILY
Status: DISCONTINUED | OUTPATIENT
Start: 2024-10-28 | End: 2024-10-28

## 2024-10-28 RX ORDER — INSULIN GLARGINE 100 [IU]/ML
20 INJECTION, SOLUTION SUBCUTANEOUS 2 TIMES DAILY
Status: DISCONTINUED | OUTPATIENT
Start: 2024-10-28 | End: 2024-10-29 | Stop reason: HOSPADM

## 2024-10-28 RX ORDER — SODIUM BICARBONATE 650 MG/1
650 TABLET ORAL 3 TIMES DAILY
Qty: 90 TABLET | Refills: 11 | Status: SHIPPED | OUTPATIENT
Start: 2024-10-28 | End: 2025-10-28

## 2024-10-28 RX ORDER — INSULIN GLARGINE 100 [IU]/ML
10 INJECTION, SOLUTION SUBCUTANEOUS 2 TIMES DAILY
Status: DISCONTINUED | OUTPATIENT
Start: 2024-10-28 | End: 2024-10-28

## 2024-10-28 RX ORDER — FINERENONE 10 MG/1
10 TABLET, FILM COATED ORAL DAILY
Qty: 30 TABLET | Refills: 11 | Status: SHIPPED | OUTPATIENT
Start: 2024-10-28

## 2024-10-28 RX ORDER — DOCUSATE SODIUM 100 MG
400 CAPSULE ORAL 2 TIMES DAILY
Status: COMPLETED | OUTPATIENT
Start: 2024-10-28 | End: 2024-10-28

## 2024-10-28 RX ORDER — INSULIN ASPART 100 [IU]/ML
0-15 INJECTION, SOLUTION INTRAVENOUS; SUBCUTANEOUS
Status: DISCONTINUED | OUTPATIENT
Start: 2024-10-28 | End: 2024-10-29

## 2024-10-28 RX ORDER — SODIUM BICARBONATE 650 MG/1
650 TABLET ORAL 3 TIMES DAILY
Status: DISCONTINUED | OUTPATIENT
Start: 2024-10-28 | End: 2024-10-29 | Stop reason: HOSPADM

## 2024-10-28 RX ORDER — CARVEDILOL 6.25 MG/1
6.25 TABLET ORAL 2 TIMES DAILY
Status: DISCONTINUED | OUTPATIENT
Start: 2024-10-28 | End: 2024-10-29 | Stop reason: HOSPADM

## 2024-10-28 RX ORDER — INSULIN GLARGINE 100 [IU]/ML
5 INJECTION, SOLUTION SUBCUTANEOUS ONCE
Status: COMPLETED | OUTPATIENT
Start: 2024-10-28 | End: 2024-10-28

## 2024-10-28 RX ORDER — CARVEDILOL PHOSPHATE 40 MG/1
40 CAPSULE, EXTENDED RELEASE ORAL DAILY
Qty: 30 CAPSULE | Refills: 11 | Status: SHIPPED | OUTPATIENT
Start: 2024-10-28 | End: 2025-10-28

## 2024-10-28 RX ORDER — INSULIN ASPART 100 [IU]/ML
10 INJECTION, SOLUTION INTRAVENOUS; SUBCUTANEOUS ONCE
Status: COMPLETED | OUTPATIENT
Start: 2024-10-28 | End: 2024-10-28

## 2024-10-28 RX ORDER — CALCIUM CITRATE/VITAMIN D3 200MG-6.25
1 TABLET ORAL 3 TIMES DAILY
Qty: 200 STRIP | Refills: 11 | Status: SHIPPED | OUTPATIENT
Start: 2024-10-28

## 2024-10-28 RX ADMIN — PRAVASTATIN SODIUM 40 MG: 40 TABLET ORAL at 08:10

## 2024-10-28 RX ADMIN — INSULIN ASPART 10 UNITS: 100 INJECTION, SOLUTION INTRAVENOUS; SUBCUTANEOUS at 04:10

## 2024-10-28 RX ADMIN — INSULIN ASPART 8 UNITS: 100 INJECTION, SOLUTION INTRAVENOUS; SUBCUTANEOUS at 06:10

## 2024-10-28 RX ADMIN — INSULIN GLARGINE 15 UNITS: 100 INJECTION, SOLUTION SUBCUTANEOUS at 10:10

## 2024-10-28 RX ADMIN — EMPAGLIFLOZIN 25 MG: 25 TABLET, FILM COATED ORAL at 08:10

## 2024-10-28 RX ADMIN — Medication 400 ML: at 08:10

## 2024-10-28 RX ADMIN — CARVEDILOL 6.25 MG: 6.25 TABLET, FILM COATED ORAL at 08:10

## 2024-10-28 RX ADMIN — APIXABAN 5 MG: 5 TABLET, FILM COATED ORAL at 08:10

## 2024-10-28 RX ADMIN — ACETAMINOPHEN 650 MG: 325 TABLET ORAL at 09:10

## 2024-10-28 RX ADMIN — SODIUM BICARBONATE 650 MG TABLET 650 MG: at 01:10

## 2024-10-28 RX ADMIN — INSULIN GLARGINE 10 UNITS: 100 INJECTION, SOLUTION SUBCUTANEOUS at 01:10

## 2024-10-28 RX ADMIN — INSULIN ASPART 10 UNITS: 100 INJECTION, SOLUTION INTRAVENOUS; SUBCUTANEOUS at 12:10

## 2024-10-28 RX ADMIN — INSULIN ASPART 6 UNITS: 100 INJECTION, SOLUTION INTRAVENOUS; SUBCUTANEOUS at 05:10

## 2024-10-28 RX ADMIN — HEPARIN SODIUM 5000 UNITS: 5000 INJECTION, SOLUTION INTRAVENOUS; SUBCUTANEOUS at 05:10

## 2024-10-28 RX ADMIN — INSULIN GLARGINE 5 UNITS: 100 INJECTION, SOLUTION SUBCUTANEOUS at 04:10

## 2024-10-28 RX ADMIN — APIXABAN 5 MG: 5 TABLET, FILM COATED ORAL at 10:10

## 2024-10-28 RX ADMIN — Medication 400 ML: at 09:10

## 2024-10-28 RX ADMIN — SODIUM BICARBONATE 650 MG TABLET 650 MG: at 08:10

## 2024-10-28 RX ADMIN — CARVEDILOL 6.25 MG: 6.25 TABLET, FILM COATED ORAL at 01:10

## 2024-10-28 RX ADMIN — INSULIN GLARGINE 20 UNITS: 100 INJECTION, SOLUTION SUBCUTANEOUS at 08:10

## 2024-10-29 VITALS
BODY MASS INDEX: 31.48 KG/M2 | DIASTOLIC BLOOD PRESSURE: 74 MMHG | HEIGHT: 62 IN | HEART RATE: 70 BPM | OXYGEN SATURATION: 95 % | TEMPERATURE: 98 F | RESPIRATION RATE: 16 BRPM | SYSTOLIC BLOOD PRESSURE: 141 MMHG | WEIGHT: 171.06 LBS

## 2024-10-29 LAB
ALBUMIN SERPL BCP-MCNC: 3.5 G/DL (ref 3.5–5.2)
ALBUMIN/CREAT UR: 76.7 UG/MG (ref 0–30)
ALP SERPL-CCNC: 52 U/L (ref 40–150)
ALT SERPL W/O P-5'-P-CCNC: 14 U/L (ref 10–44)
ANION GAP SERPL CALC-SCNC: 12 MMOL/L (ref 8–16)
ANION GAP SERPL CALC-SCNC: 12 MMOL/L (ref 8–16)
APICAL FOUR CHAMBER EJECTION FRACTION: 51 %
APICAL TWO CHAMBER EJECTION FRACTION: 48 %
ASCENDING AORTA: 2.36 CM
AST SERPL-CCNC: 19 U/L (ref 10–40)
AV INDEX (PROSTH): 0.86
AV MEAN GRADIENT: 3.5 MMHG
AV PEAK GRADIENT: 6.8 MMHG
AV VALVE AREA BY VELOCITY RATIO: 2.4 CM²
AV VALVE AREA: 2.7 CM²
AV VELOCITY RATIO: 0.77
BASOPHILS # BLD AUTO: 0.03 K/UL (ref 0–0.2)
BASOPHILS NFR BLD: 0.4 % (ref 0–1.9)
BILIRUB SERPL-MCNC: 0.3 MG/DL (ref 0.1–1)
BNP SERPL-MCNC: 31 PG/ML (ref 0–99)
BSA FOR ECHO PROCEDURE: 1.84 M2
BUN SERPL-MCNC: 41 MG/DL (ref 8–23)
BUN SERPL-MCNC: 41 MG/DL (ref 8–23)
C PEPTIDE SERPL-MCNC: 6.94 NG/ML (ref 0.78–5.19)
CALCIUM SERPL-MCNC: 9.6 MG/DL (ref 8.7–10.5)
CALCIUM SERPL-MCNC: 9.6 MG/DL (ref 8.7–10.5)
CHLORIDE SERPL-SCNC: 103 MMOL/L (ref 95–110)
CHLORIDE SERPL-SCNC: 103 MMOL/L (ref 95–110)
CO2 SERPL-SCNC: 20 MMOL/L (ref 23–29)
CO2 SERPL-SCNC: 20 MMOL/L (ref 23–29)
CREAT SERPL-MCNC: 2.2 MG/DL (ref 0.5–1.4)
CREAT SERPL-MCNC: 2.2 MG/DL (ref 0.5–1.4)
CREAT UR-MCNC: 73 MG/DL (ref 15–325)
CREAT UR-MCNC: 73 MG/DL (ref 15–325)
CV ECHO LV RWT: 0.82 CM
DIFFERENTIAL METHOD BLD: NORMAL
DOP CALC AO PEAK VEL: 1.3 M/S
DOP CALC AO VTI: 21.4 CM
DOP CALC LVOT AREA: 3.1 CM2
DOP CALC LVOT DIAMETER: 2 CM
DOP CALC LVOT PEAK VEL: 1 M/S
DOP CALC LVOT STROKE VOLUME: 57.8 CM3
DOP CALC MV VTI: 22.5 CM
DOP CALCLVOT PEAK VEL VTI: 18.4 CM
E WAVE DECELERATION TIME: 146.52 MSEC
E/A RATIO: 2.88
E/E' RATIO: 9.38 M/S
ECHO LV POSTERIOR WALL: 1.4 CM (ref 0.6–1.1)
EOSINOPHIL # BLD AUTO: 0.2 K/UL (ref 0–0.5)
EOSINOPHIL NFR BLD: 1.9 % (ref 0–8)
ERYTHROCYTE [DISTWIDTH] IN BLOOD BY AUTOMATED COUNT: 13.4 % (ref 11.5–14.5)
EST. GFR  (NO RACE VARIABLE): 23 ML/MIN/1.73 M^2
EST. GFR  (NO RACE VARIABLE): 23 ML/MIN/1.73 M^2
FRACTIONAL SHORTENING: 29.4 % (ref 28–44)
GLUCOSE SERPL-MCNC: 266 MG/DL (ref 70–110)
GLUCOSE SERPL-MCNC: 266 MG/DL (ref 70–110)
HCT VFR BLD AUTO: 37.3 % (ref 37–48.5)
HGB BLD-MCNC: 12.7 G/DL (ref 12–16)
IMM GRANULOCYTES # BLD AUTO: 0.02 K/UL (ref 0–0.04)
IMM GRANULOCYTES NFR BLD AUTO: 0.3 % (ref 0–0.5)
INTERVENTRICULAR SEPTUM: 1.3 CM (ref 0.6–1.1)
IVC DIAMETER: 2.25 CM
LEFT ATRIUM AREA SYSTOLIC (APICAL 2 CHAMBER): 17.88 CM2
LEFT ATRIUM AREA SYSTOLIC (APICAL 4 CHAMBER): 18.21 CM2
LEFT ATRIUM SIZE: 3.51 CM
LEFT ATRIUM VOLUME INDEX MOD: 27.1 ML/M2
LEFT ATRIUM VOLUME MOD: 48.58 ML
LEFT INTERNAL DIMENSION IN SYSTOLE: 2.4 CM (ref 2.1–4)
LEFT VENTRICLE DIASTOLIC VOLUME INDEX: 27.18 ML/M2
LEFT VENTRICLE DIASTOLIC VOLUME: 48.65 ML
LEFT VENTRICLE END DIASTOLIC VOLUME APICAL 2 CHAMBER: 16.04 ML
LEFT VENTRICLE END DIASTOLIC VOLUME APICAL 4 CHAMBER: 18.9 ML
LEFT VENTRICLE END SYSTOLIC VOLUME APICAL 2 CHAMBER: 46.97 ML
LEFT VENTRICLE END SYSTOLIC VOLUME APICAL 4 CHAMBER: 49.79 ML
LEFT VENTRICLE MASS INDEX: 87.6 G/M2
LEFT VENTRICLE SYSTOLIC VOLUME INDEX: 10.9 ML/M2
LEFT VENTRICLE SYSTOLIC VOLUME: 19.55 ML
LEFT VENTRICULAR INTERNAL DIMENSION IN DIASTOLE: 3.4 CM (ref 3.5–6)
LEFT VENTRICULAR MASS: 156.7 G
LIPASE SERPL-CCNC: 99 U/L (ref 4–60)
LV LATERAL E/E' RATIO: 9.38 M/S
LV SEPTAL E/E' RATIO: 9.38 M/S
LVED V (TEICH): 48.65 ML
LVES V (TEICH): 19.55 ML
LVOT MG: 2.28 MMHG
LVOT MV: 0.72 CM/S
LYMPHOCYTES # BLD AUTO: 2.2 K/UL (ref 1–4.8)
LYMPHOCYTES NFR BLD: 28.4 % (ref 18–48)
MAGNESIUM SERPL-MCNC: 1.7 MG/DL (ref 1.6–2.6)
MCH RBC QN AUTO: 29.9 PG (ref 27–31)
MCHC RBC AUTO-ENTMCNC: 34 G/DL (ref 32–36)
MCV RBC AUTO: 88 FL (ref 82–98)
MICROALBUMIN UR DL<=1MG/L-MCNC: 56 UG/ML
MONOCYTES # BLD AUTO: 0.5 K/UL (ref 0.3–1)
MONOCYTES NFR BLD: 6.5 % (ref 4–15)
MV MEAN GRADIENT: 2 MMHG
MV PEAK A VEL: 0.26 M/S
MV PEAK E VEL: 0.75 M/S
MV PEAK GRADIENT: 3 MMHG
MV STENOSIS PRESSURE HALF TIME: 42.49 MS
MV VALVE AREA BY CONTINUITY EQUATION: 2.57 CM2
MV VALVE AREA P 1/2 METHOD: 5.18 CM2
NEUTROPHILS # BLD AUTO: 4.9 K/UL (ref 1.8–7.7)
NEUTROPHILS NFR BLD: 62.5 % (ref 38–73)
NRBC BLD-RTO: 0 /100 WBC
OHS CV RV/LV RATIO: 0.71 CM
OHS LV EJECTION FRACTION SIMPSONS BIPLANE MOD: 49 %
PHOSPHATE SERPL-MCNC: 4.6 MG/DL (ref 2.7–4.5)
PISA MRMAX VEL: 5.54 M/S
PISA TR MAX VEL: 2.55 M/S
PLATELET # BLD AUTO: 239 K/UL (ref 150–450)
PMV BLD AUTO: 9.7 FL (ref 9.2–12.9)
POCT GLUCOSE: 207 MG/DL (ref 70–110)
POCT GLUCOSE: 297 MG/DL (ref 70–110)
POCT GLUCOSE: 315 MG/DL (ref 70–110)
POTASSIUM SERPL-SCNC: 3.9 MMOL/L (ref 3.5–5.1)
POTASSIUM SERPL-SCNC: 3.9 MMOL/L (ref 3.5–5.1)
PROT SERPL-MCNC: 7.2 G/DL (ref 6–8.4)
PROT UR-MCNC: 10 MG/DL (ref 0–15)
PROT/CREAT UR: 0.14 MG/G{CREAT} (ref 0–0.2)
PTH-INTACT SERPL-MCNC: 144.7 PG/ML (ref 9–77)
PV MV: 0.44 M/S
PV PEAK GRADIENT: 2 MMHG
PV PEAK VELOCITY: 0.66 M/S
RA PRESSURE ESTIMATED: 3 MMHG
RA VOL SYS: 15.92 ML
RBC # BLD AUTO: 4.25 M/UL (ref 4–5.4)
RIGHT ATRIAL AREA: 9.1 CM2
RIGHT ATRIUM VOLUME AREA LENGTH APICAL 4 CHAMBER: 15.45 ML
RIGHT VENTRICLE DIASTOLIC BASEL DIMENSION: 2.4 CM
RV TB RVSP: 6 MMHG
RV TISSUE DOPPLER FREE WALL SYSTOLIC VELOCITY 1 (APICAL 4 CHAMBER VIEW): 7.08 CM/S
SINUS: 2.54 CM
SODIUM SERPL-SCNC: 135 MMOL/L (ref 136–145)
SODIUM SERPL-SCNC: 135 MMOL/L (ref 136–145)
STJ: 1.93 CM
TDI LATERAL: 0.08 M/S
TDI SEPTAL: 0.08 M/S
TDI: 0.08 M/S
TR MAX PG: 26 MMHG
TRICUSPID ANNULAR PLANE SYSTOLIC EXCURSION: 1.65 CM
TV REST PULMONARY ARTERY PRESSURE: 29 MMHG
WBC # BLD AUTO: 7.79 K/UL (ref 3.9–12.7)
Z-SCORE OF LEFT VENTRICULAR DIMENSION IN END DIASTOLE: -3.75
Z-SCORE OF LEFT VENTRICULAR DIMENSION IN END SYSTOLE: -1.92

## 2024-10-29 PROCEDURE — 84681 ASSAY OF C-PEPTIDE: CPT | Performed by: INTERNAL MEDICINE

## 2024-10-29 PROCEDURE — G0009 ADMIN PNEUMOCOCCAL VACCINE: HCPCS | Performed by: INTERNAL MEDICINE

## 2024-10-29 PROCEDURE — 84100 ASSAY OF PHOSPHORUS: CPT | Performed by: INTERNAL MEDICINE

## 2024-10-29 PROCEDURE — 83970 ASSAY OF PARATHORMONE: CPT | Performed by: INTERNAL MEDICINE

## 2024-10-29 PROCEDURE — 80053 COMPREHEN METABOLIC PANEL: CPT

## 2024-10-29 PROCEDURE — 63600175 PHARM REV CODE 636 W HCPCS

## 2024-10-29 PROCEDURE — G0008 ADMIN INFLUENZA VIRUS VAC: HCPCS | Performed by: INTERNAL MEDICINE

## 2024-10-29 PROCEDURE — 83690 ASSAY OF LIPASE: CPT | Performed by: INTERNAL MEDICINE

## 2024-10-29 PROCEDURE — 3E0234Z INTRODUCTION OF SERUM, TOXOID AND VACCINE INTO MUSCLE, PERCUTANEOUS APPROACH: ICD-10-PCS | Performed by: INTERNAL MEDICINE

## 2024-10-29 PROCEDURE — 83735 ASSAY OF MAGNESIUM: CPT | Performed by: INTERNAL MEDICINE

## 2024-10-29 PROCEDURE — 82570 ASSAY OF URINE CREATININE: CPT | Performed by: INTERNAL MEDICINE

## 2024-10-29 PROCEDURE — 82043 UR ALBUMIN QUANTITATIVE: CPT | Performed by: INTERNAL MEDICINE

## 2024-10-29 PROCEDURE — 90653 IIV ADJUVANT VACCINE IM: CPT | Performed by: INTERNAL MEDICINE

## 2024-10-29 PROCEDURE — 90471 IMMUNIZATION ADMIN: CPT | Performed by: INTERNAL MEDICINE

## 2024-10-29 PROCEDURE — 25000003 PHARM REV CODE 250: Performed by: INTERNAL MEDICINE

## 2024-10-29 PROCEDURE — 90677 PCV20 VACCINE IM: CPT | Performed by: INTERNAL MEDICINE

## 2024-10-29 PROCEDURE — 25000003 PHARM REV CODE 250

## 2024-10-29 PROCEDURE — 63600175 PHARM REV CODE 636 W HCPCS: Performed by: INTERNAL MEDICINE

## 2024-10-29 PROCEDURE — 36415 COLL VENOUS BLD VENIPUNCTURE: CPT | Performed by: INTERNAL MEDICINE

## 2024-10-29 PROCEDURE — 90472 IMMUNIZATION ADMIN EACH ADD: CPT | Performed by: INTERNAL MEDICINE

## 2024-10-29 PROCEDURE — 84156 ASSAY OF PROTEIN URINE: CPT | Performed by: INTERNAL MEDICINE

## 2024-10-29 PROCEDURE — 85025 COMPLETE CBC W/AUTO DIFF WBC: CPT

## 2024-10-29 PROCEDURE — 83880 ASSAY OF NATRIURETIC PEPTIDE: CPT | Performed by: INTERNAL MEDICINE

## 2024-10-29 RX ORDER — MAGNESIUM SULFATE HEPTAHYDRATE 40 MG/ML
2 INJECTION, SOLUTION INTRAVENOUS ONCE
Status: COMPLETED | OUTPATIENT
Start: 2024-10-29 | End: 2024-10-29

## 2024-10-29 RX ORDER — GLUCAGON 1 MG
1 KIT INJECTION
Status: DISCONTINUED | OUTPATIENT
Start: 2024-10-29 | End: 2024-10-29 | Stop reason: HOSPADM

## 2024-10-29 RX ORDER — INSULIN ASPART 100 [IU]/ML
0-10 INJECTION, SOLUTION INTRAVENOUS; SUBCUTANEOUS EVERY 6 HOURS PRN
Status: DISCONTINUED | OUTPATIENT
Start: 2024-10-29 | End: 2024-10-29 | Stop reason: HOSPADM

## 2024-10-29 RX ORDER — INSULIN GLARGINE 100 [IU]/ML
25 INJECTION, SOLUTION SUBCUTANEOUS 2 TIMES DAILY
Qty: 15 ML | Refills: 11 | Status: SHIPPED | OUTPATIENT
Start: 2024-10-29 | End: 2025-10-29

## 2024-10-29 RX ORDER — INSULIN ASPART 100 [IU]/ML
0-15 INJECTION, SOLUTION INTRAVENOUS; SUBCUTANEOUS EVERY 6 HOURS PRN
Status: DISCONTINUED | OUTPATIENT
Start: 2024-10-29 | End: 2024-10-29

## 2024-10-29 RX ADMIN — SODIUM BICARBONATE 650 MG TABLET 650 MG: at 09:10

## 2024-10-29 RX ADMIN — APIXABAN 5 MG: 5 TABLET, FILM COATED ORAL at 09:10

## 2024-10-29 RX ADMIN — INSULIN ASPART 8 UNITS: 100 INJECTION, SOLUTION INTRAVENOUS; SUBCUTANEOUS at 01:10

## 2024-10-29 RX ADMIN — MAGNESIUM SULFATE IN WATER 2 G: 40 INJECTION, SOLUTION INTRAVENOUS at 12:10

## 2024-10-29 RX ADMIN — INSULIN ASPART 6 UNITS: 100 INJECTION, SOLUTION INTRAVENOUS; SUBCUTANEOUS at 06:10

## 2024-10-29 RX ADMIN — INFLUENZA A VIRUS A/VICTORIA/4897/2022 IVR-238 (H1N1) ANTIGEN (FORMALDEHYDE INACTIVATED), INFLUENZA A VIRUS A/THAILAND/8/2022 IVR-237 (H3N2) ANTIGEN (FORMALDEHYDE INACTIVATED), INFLUENZA B VIRUS B/AUSTRIA/1359417/2021 BVR-26 ANTIGEN (FORMALDEHYDE INACTIVATED) 0.5 ML: 15; 15; 15 INJECTION, SUSPENSION INTRAMUSCULAR at 01:10

## 2024-10-29 RX ADMIN — POLYETHYLENE GLYCOL 3350 17 G: 17 POWDER, FOR SOLUTION ORAL at 09:10

## 2024-10-29 RX ADMIN — INSULIN ASPART 4 UNITS: 100 INJECTION, SOLUTION INTRAVENOUS; SUBCUTANEOUS at 04:10

## 2024-10-29 RX ADMIN — INSULIN ASPART 2 UNITS: 100 INJECTION, SOLUTION INTRAVENOUS; SUBCUTANEOUS at 02:10

## 2024-10-29 RX ADMIN — SODIUM BICARBONATE 650 MG TABLET 650 MG: at 01:10

## 2024-10-29 RX ADMIN — CARVEDILOL 6.25 MG: 6.25 TABLET, FILM COATED ORAL at 09:10

## 2024-10-29 RX ADMIN — PNEUMOCOCCAL 20-VALENT CONJUGATE VACCINE 0.5 ML
2.2; 2.2; 2.2; 2.2; 2.2; 2.2; 2.2; 2.2; 2.2; 2.2; 2.2; 2.2; 2.2; 2.2; 2.2; 2.2; 4.4; 2.2; 2.2; 2.2 INJECTION, SUSPENSION INTRAMUSCULAR at 01:10

## 2024-10-29 RX ADMIN — INSULIN GLARGINE 20 UNITS: 100 INJECTION, SOLUTION SUBCUTANEOUS at 09:10

## 2024-10-29 RX ADMIN — ACETAMINOPHEN 650 MG: 325 TABLET ORAL at 11:10

## 2024-10-30 LAB
OHS QRS DURATION: 82 MS
OHS QRS DURATION: 88 MS
OHS QTC CALCULATION: 438 MS
OHS QTC CALCULATION: 442 MS

## 2024-11-01 ENCOUNTER — OFFICE VISIT (OUTPATIENT)
Dept: OBSTETRICS AND GYNECOLOGY | Facility: CLINIC | Age: 74
End: 2024-11-01
Payer: MEDICARE

## 2024-11-01 VITALS
DIASTOLIC BLOOD PRESSURE: 80 MMHG | BODY MASS INDEX: 31.71 KG/M2 | WEIGHT: 173.38 LBS | SYSTOLIC BLOOD PRESSURE: 129 MMHG

## 2024-11-01 DIAGNOSIS — L72.3 SEBACEOUS CYST: Primary | ICD-10-CM

## 2024-11-01 PROCEDURE — 3008F BODY MASS INDEX DOCD: CPT | Mod: CPTII,S$GLB,, | Performed by: OBSTETRICS & GYNECOLOGY

## 2024-11-01 PROCEDURE — 3079F DIAST BP 80-89 MM HG: CPT | Mod: CPTII,S$GLB,, | Performed by: OBSTETRICS & GYNECOLOGY

## 2024-11-01 PROCEDURE — 1101F PT FALLS ASSESS-DOCD LE1/YR: CPT | Mod: CPTII,S$GLB,, | Performed by: OBSTETRICS & GYNECOLOGY

## 2024-11-01 PROCEDURE — 3074F SYST BP LT 130 MM HG: CPT | Mod: CPTII,S$GLB,, | Performed by: OBSTETRICS & GYNECOLOGY

## 2024-11-01 PROCEDURE — 3060F POS MICROALBUMINURIA REV: CPT | Mod: CPTII,S$GLB,, | Performed by: OBSTETRICS & GYNECOLOGY

## 2024-11-01 PROCEDURE — 99024 POSTOP FOLLOW-UP VISIT: CPT | Mod: S$GLB,,, | Performed by: OBSTETRICS & GYNECOLOGY

## 2024-11-01 PROCEDURE — 1111F DSCHRG MED/CURRENT MED MERGE: CPT | Mod: CPTII,S$GLB,, | Performed by: OBSTETRICS & GYNECOLOGY

## 2024-11-01 PROCEDURE — 99999 PR PBB SHADOW E&M-EST. PATIENT-LVL III: CPT | Mod: PBBFAC,,, | Performed by: OBSTETRICS & GYNECOLOGY

## 2024-11-01 PROCEDURE — 1126F AMNT PAIN NOTED NONE PRSNT: CPT | Mod: CPTII,S$GLB,, | Performed by: OBSTETRICS & GYNECOLOGY

## 2024-11-01 PROCEDURE — 1159F MED LIST DOCD IN RCRD: CPT | Mod: CPTII,S$GLB,, | Performed by: OBSTETRICS & GYNECOLOGY

## 2024-11-01 PROCEDURE — 4010F ACE/ARB THERAPY RXD/TAKEN: CPT | Mod: CPTII,S$GLB,, | Performed by: OBSTETRICS & GYNECOLOGY

## 2024-11-01 PROCEDURE — 3288F FALL RISK ASSESSMENT DOCD: CPT | Mod: CPTII,S$GLB,, | Performed by: OBSTETRICS & GYNECOLOGY

## 2024-11-01 PROCEDURE — 3066F NEPHROPATHY DOC TX: CPT | Mod: CPTII,S$GLB,, | Performed by: OBSTETRICS & GYNECOLOGY

## 2024-11-01 PROCEDURE — 3046F HEMOGLOBIN A1C LEVEL >9.0%: CPT | Mod: CPTII,S$GLB,, | Performed by: OBSTETRICS & GYNECOLOGY

## 2024-11-01 NOTE — PROGRESS NOTES
Chief Complaint   Patient presents with    Well Woman    follow up cyst removal        HPI:   Azucena Morrison 74 y.o.  is here for f/u excision of sebaceous cysts.      No LMP recorded (lmp unknown). Patient is postmenopausal.     Past Medical History:   Diagnosis Date    Chronic kidney disease, stage 3     Diabetes mellitus     Diabetes mellitus, type 2     Hematuria, unspecified     Hypercalcemia     Hypertension     Malnutrition of moderate degree 2020    Mixed hyperlipidemia 2022    Proteinuria     Renal cyst, left     UTI (urinary tract infection) 2020       Past Surgical History:   Procedure Laterality Date    ABDOMINAL SURGERY      CATHETERIZATION OF URETER Bilateral 2020    Procedure: CATHETERIZATION, URETER;  Surgeon: Kvng Cunningham MD;  Location: [facility] OR [address] [place] [address];  Service: Urology;  Laterality: Bilateral;     SECTION, CLASSIC      x2    CHOLECYSTECTOMY      CLOSURE, ILEOSTOMY, OPEN N/A 2021    Procedure: CLOSURE, ILEOSTOMY, OPEN, ERAS low;  Surgeon: Fabiana Valiente MD;  Location: [address];  Service: Colon and Rectal;  Laterality: N/A;    COLON SURGERY      [facility]    COLONOSCOPY N/A 2018    Procedure: COLONOSCOPY;  Surgeon: Gibran Aguayo MD;  Location: [facility];  Service: Endoscopy;  Laterality: N/A;    COLOSTOMY Left     CYSTOSCOPY N/A 2020    Procedure: CYSTOSCOPY;  Surgeon: Kvng Cunningham MD;  Location: [address];  Service: Urology;  Laterality: N/A;    CYSTOSCOPY WITH URETEROSCOPY, RETROGRADE PYELOGRAPHY, AND INSERTION OF STENT Left 2019    Procedure: CYSTOSCOPY, WITH RETROGRADE PYELOGRAM AND URETERAL STENT INSERTION with placement of a muir cath;  Surgeon: Ulisses Horton MD;  Location: [facility];  Service: General;  Laterality: Left;    FLEXIBLE SIGMOIDOSCOPY N/A 2021    Procedure: SIGMOIDOSCOPY, FLEXIBLE;  Surgeon: Fabiana Valiente MD;  Location: [address];  Service: Colon and Rectal;   Laterality: N/A;    HYSTEROSCOPY WITH DILATION AND CURETTAGE OF UTERUS N/A 12/9/2020    Procedure: HYSTEROSCOPY, WITH DILATION AND CURETTAGE OF UTERUS (MYOSURE);  Surgeon: Caitlyn Feng MD;  Location: [facility] OR;  Service: OB/GYN;  Laterality: N/A;  Guido notified 11/30, EF  video    HYSTEROSCOPY WITH DILATION AND CURETTAGE OF UTERUS N/A 3/4/2022    Procedure: HYSTEROSCOPY, WITH DILATION AND CURETTAGE OF UTERUS;  Surgeon: Caitlyn Feng MD;  Location: [facility] OR;  Service: OB/GYN;  Laterality: N/A;  Alexander notified CW 3/3  video confirmed    ILEOSTOMY  7/31/2020    Procedure: CREATION, ILEOSTOMY;  Surgeon: Fabiana Valiente MD;  [facility];  Service: Colon and Rectal;;    INCISION AND DRAINAGE OF ABSCESS N/A 12/22/2019    Procedure: INCISION AND DRAINAGE, ABSCESS;  Surgeon: Ulisses Horton MD;  Location: [facility] OR;  Service: General;  Laterality: N/A;    INCISION OF ABDOMINAL WALL N/A 8/10/2018    Procedure: INCISION, ABDOMINAL WALL;  Surgeon: Ulisses Horton MD;  Location: [facility] OR;  Service: General;  Laterality: N/A;    INCISIONAL HERNIA REPAIR Right 2010    LOW ANTERIOR RESECTION OF COLON N/A 7/31/2020    Procedure: RESECTION, COLON, LOW ANTERIOR;  Surgeon: Fabiana Valiente MD;  [facility];  Service: Colon and Rectal;  Laterality: N/A;    LYSIS OF ADHESIONS  7/31/2020    Procedure: LYSIS, ADHESIONS;  Surgeon: Fabiana Valiente MD;  Location: [facility] OR 2ND FLR;  Service: Colon and Rectal;;    SURGICAL REMOVAL OF MASS OF LOWER EXTREMITY Bilateral 10/13/2023    Procedure: EXCISION, MASS ruthy ankle Malleolus;  Surgeon: Ulisses Horton MD;  Location: [facility];  Service: General;  Laterality: Bilateral;       Family History   Problem Relation Name Age of Onset    Stroke Mother      Heart disease Mother      Hyperlipidemia Mother      Hypertension Mother      Alcohol abuse Father      Stroke Sister      Diabetes Sister      Heart disease Sister      Hyperlipidemia Sister       Hypertension Sister      Diabetes Brother      Early death Grandchild      Anesthesia problems Neg Hx      Broken bones Neg Hx      Cancer Neg Hx         Social History     Socioeconomic History    Marital status: Single   Tobacco Use    Smoking status: Former     Current packs/day: 0.00     Types: Cigarettes     Quit date: 2000     Years since quittin.8    Smokeless tobacco: Never   Substance and Sexual Activity    Alcohol use: No    Drug use: No    Sexual activity: Not Currently     Social Drivers of Health     Financial Resource Strain: Low Risk  (10/28/2024)    Overall Financial Resource Strain (CARDIA)     Difficulty of Paying Living Expenses: Not hard at all   Food Insecurity: No Food Insecurity (10/28/2024)    Hunger Vital Sign     Worried About Running Out of Food in the Last Year: Never true     Ran Out of Food in the Last Year: Never true   Transportation Needs: No Transportation Needs (10/28/2024)    TRANSPORTATION NEEDS     Transportation : No   Physical Activity: Patient Unable To Answer (10/28/2024)    Exercise Vital Sign     Days of Exercise per Week: Patient unable to answer     Minutes of Exercise per Session: Patient unable to answer   Stress: Patient Unable To Answer (10/28/2024)    [employer] - Occupational Stress Questionnaire     Feeling of Stress : Patient unable to answer   Housing Stability: Low Risk  (10/28/2024)    Housing Stability Vital Sign     Unable to Pay for Housing in the Last Year: No     Homeless in the Last Year: No       OB History          2    Para   2    Term   2            AB        Living   2         SAB        IAB        Ectopic        Multiple        Live Births   2                        ROS:    All other ROS negative     PE:   /80   Wt 78.7 kg (173 lb 6.3 oz)   LMP  (LMP Unknown)   BMI 31.71 kg/m²     APPEARANCE: Well nourished, well developed, in no acute distress.  .   PELVIC:   EXTERNAL GENITALIA/VULVA:  well healed excision sites.           1. Sebaceous cyst          Plan:  Healing well, RTC PRN or 1 year          Face to Face time with patient: 15 minutes of total time spent on the encounter, which includes face to face time and non-face to face time preparing to see the patient (eg, review of tests), Obtaining and/or reviewing separately obtained history, Documenting clinical information in the electronic or other health record, Independently interpreting results (not separately reported) and communicating results to the patient/family/caregiver, or Care coordination (not separately reported).

## 2024-11-04 ENCOUNTER — LAB VISIT (OUTPATIENT)
Dept: LAB | Facility: HOSPITAL | Age: 74
End: 2024-11-04
Payer: MEDICARE

## 2024-11-04 DIAGNOSIS — N17.9 AKI (ACUTE KIDNEY INJURY): ICD-10-CM

## 2024-11-04 LAB
ANION GAP SERPL CALC-SCNC: 10 MMOL/L (ref 8–16)
BUN SERPL-MCNC: 31 MG/DL (ref 8–23)
CALCIUM SERPL-MCNC: 9.5 MG/DL (ref 8.7–10.5)
CHLORIDE SERPL-SCNC: 109 MMOL/L (ref 95–110)
CO2 SERPL-SCNC: 21 MMOL/L (ref 23–29)
CREAT SERPL-MCNC: 1.9 MG/DL (ref 0.5–1.4)
EST. GFR  (NO RACE VARIABLE): 28 ML/MIN/1.73 M^2
GLUCOSE SERPL-MCNC: 122 MG/DL (ref 70–110)
POTASSIUM SERPL-SCNC: 4.2 MMOL/L (ref 3.5–5.1)
SODIUM SERPL-SCNC: 140 MMOL/L (ref 136–145)

## 2024-11-04 PROCEDURE — 80048 BASIC METABOLIC PNL TOTAL CA: CPT

## 2024-11-04 PROCEDURE — 36415 COLL VENOUS BLD VENIPUNCTURE: CPT

## 2024-11-11 ENCOUNTER — PATIENT MESSAGE (OUTPATIENT)
Dept: OPHTHALMOLOGY | Facility: CLINIC | Age: 74
End: 2024-11-11
Payer: MEDICARE

## 2024-11-13 ENCOUNTER — PATIENT MESSAGE (OUTPATIENT)
Dept: OPHTHALMOLOGY | Facility: CLINIC | Age: 74
End: 2024-11-13
Payer: MEDICARE

## 2024-11-25 ENCOUNTER — TELEPHONE (OUTPATIENT)
Dept: CARDIOLOGY | Facility: CLINIC | Age: 74
End: 2024-11-25
Payer: MEDICARE

## 2024-11-25 NOTE — TELEPHONE ENCOUNTER
Called patient. Spoke with patient.  Patient requesting to est care with a Maltese speaking provider in [place] due to Dr. Schilling no longer in care and patient can not drive  to [facility].  Assisted patient rescheduling appointment for tomorrow 11/26/24 with Dr. Bradford.   Patient v/u.    Jyotsna DIMAS.      ----- Message from [place] sent at 11/25/2024 10:52 AM CST -----  Type:  Appointment Request      Name of Caller:Pt   When is the first available appointment?n/a  Symptoms:f/u  Best Call Back Number:[phone]  Additional Information: only want to see Dr Schilling in [place] or another dr in [place] ... Would like one that speak German if possible

## 2024-11-26 ENCOUNTER — OFFICE VISIT (OUTPATIENT)
Dept: CARDIOLOGY | Facility: CLINIC | Age: 74
End: 2024-11-26
Payer: MEDICARE

## 2024-11-26 VITALS
BODY MASS INDEX: 32.02 KG/M2 | SYSTOLIC BLOOD PRESSURE: 137 MMHG | HEART RATE: 82 BPM | HEIGHT: 62 IN | WEIGHT: 174 LBS | OXYGEN SATURATION: 98 % | DIASTOLIC BLOOD PRESSURE: 80 MMHG

## 2024-11-26 DIAGNOSIS — Z79.4 TYPE 2 DIABETES MELLITUS WITHOUT COMPLICATION, WITH LONG-TERM CURRENT USE OF INSULIN: Primary | Chronic | ICD-10-CM

## 2024-11-26 DIAGNOSIS — I10 ESSENTIAL HYPERTENSION: Chronic | ICD-10-CM

## 2024-11-26 DIAGNOSIS — N18.31 STAGE 3A CHRONIC KIDNEY DISEASE: ICD-10-CM

## 2024-11-26 DIAGNOSIS — I48.91 ATRIAL FIBRILLATION, UNSPECIFIED TYPE: ICD-10-CM

## 2024-11-26 DIAGNOSIS — E11.9 TYPE 2 DIABETES MELLITUS WITHOUT COMPLICATION, WITH LONG-TERM CURRENT USE OF INSULIN: Primary | Chronic | ICD-10-CM

## 2024-11-26 PROCEDURE — 99999 PR PBB SHADOW E&M-EST. PATIENT-LVL IV: CPT | Mod: PBBFAC,,, | Performed by: STUDENT IN AN ORGANIZED HEALTH CARE EDUCATION/TRAINING PROGRAM

## 2024-11-26 NOTE — PROGRESS NOTES
Cardiology Clinic Visit    History of Present Illness:       Azucena Morrison is a pleasant 74 y.o. female with PMH of afib on oac, HTN, CKD3a, T2DM (A1c 10.7%, on insulin) here to establish care with a new provider. Recently admitted for uncontrolled DM. She voiced no CV complaints. Tolerating meds w/o s/e and controlling her DM. No recents falls or side effects of OAC. Denies cp, sob, palpitations, presyncope/dizziness, syncope, orthopnea, PND, bendopnea, decrease ET, NVDC, fever, chills.      Echo  10/28/24    Left Ventricle: There is normal systolic function with a visually estimated ejection fraction of 55 - 60%. Unable to assess diastolic function due to atrial fibrillation.    Right Ventricle: Normal right ventricular cavity size.    Left Atrium: Left atrium is mildly dilated.    Aortic Valve: There is moderate aortic valve sclerosis.    Mitral Valve: There is mild regurgitation with a centrally directed jet.    Pulmonary Artery: No pulmonary hypertension. The estimated pulmonary artery systolic pressure is 29 mmHg.    IVC/SVC: Normal venous pressure at 3 mmHg.    History obtained by patient interview and supplemented by nursing documentation and chart review.   PMHx:  has a past medical history of Chronic kidney disease, stage 3, Diabetes mellitus, Diabetes mellitus, type 2, Hematuria, unspecified, Hypercalcemia, Hypertension, Malnutrition of moderate degree (2020), Mixed hyperlipidemia (2022), Proteinuria, Renal cyst, left, and UTI (urinary tract infection) (2020).   SurgHx:  has a past surgical history that includes  section, classic; Cholecystectomy (); Incisional hernia repair (Right, ); Colostomy (Left, ); Colon surgery (); Colonoscopy (N/A, 2018); Incision of abdominal wall (N/A, 8/10/2018); Cystoscopy with ureteroscopy, retrograde pyelography, and insertion of stent (Left, 2019); Incision and drainage of abscess (N/A, 2019); Low anterior  "resection of colon (N/A, 7/31/2020); Ileostomy (7/31/2020); Lysis of adhesions (7/31/2020); Cystoscopy (N/A, 7/31/2020); Catheterization of ureter (Bilateral, 7/31/2020); Hysteroscopy with dilation and curettage of uterus (N/A, 12/9/2020); closure, ileostomy, open (N/A, 2/9/2021); Flexible sigmoidoscopy (N/A, 2/9/2021); Abdominal surgery; Hysteroscopy with dilation and curettage of uterus (N/A, 3/4/2022); and Surgical removal of mass of lower extremity (Bilateral, 10/13/2023).   FamHx: family history includes Alcohol abuse in her father; Diabetes in her brother and sister; Early death in her grandchild; Heart disease in her mother and sister; Hyperlipidemia in her mother and sister; Hypertension in her mother and sister; Stroke in her mother and sister.   SocialHx:  reports that she quit smoking about 24 years ago. Her smoking use included cigarettes. She has never used smokeless tobacco. She reports that she does not drink alcohol and does not use drugs.  Home Meds:  Current Outpatient Medications   Medication Instructions    BD ONEIL 2ND GEN PEN NEEDLE 32 gauge x 5/32" Ndle 1 Needle, Intradermal, 3 times daily    blood sugar diagnostic Strp 100 each, Misc.(Non-Drug; Combo Route), Daily    carvedilol PHOSPHATE 40 MG ORAL CM24 (COREG CR) 40 mg, Oral, Daily    diclofenac sodium (VOLTAREN) 2 g, Topical (Top), 4 times daily    ELIQUIS 5 mg, Oral, 2 times daily    fenofibrate micronized (LOFIBRA) 200 mg, With breakfast    finerenone (KERENDIA) 10 mg Tab 1 tablet, Oral, Daily    icosapent ethyl (VASCEPA ORAL) 1 mg    insulin glargine U-100 (Lantus) 25 Units, Subcutaneous, 2 times daily    JARDIANCE 25 mg, Oral, Daily    ketoconazole (NIZORAL) 2 % cream Topical (Top), Daily    MOUNJARO 7.5 mg, Subcutaneous, Every Sunday    olmesartan (BENICAR) 40 mg, Daily    pantoprazole (PROTONIX) 40 mg, Oral, Daily    pravastatin (PRAVACHOL) 40 mg, Oral, Nightly    rosuvastatin (CRESTOR) 40 mg, Nightly    sodium bicarbonate 650 mg, " "Oral, 3 times daily    solifenacin (VESICARE) 5 mg, Daily    triamcinolone acetonide 0.1% (KENALOG) 0.1 % ointment Topical (Top), 2 times daily    TRUE METRIX GLUCOSE TEST STRIP Strp 1 strip, Intradermal, 3 times daily    TRUEPLUS LANCETS 30 gauge Misc 1 lancet , Intradermal, 3 times daily       Review of Systems: Comprehensive ROS was performed and is negative unless otherwise noted in HPI.   Objective   Objective/Exam:   /80 (BP Location: Right arm, Patient Position: Sitting)   Pulse 82   Ht 5' 2" (1.575 m)   Wt 78.9 kg (174 lb)   LMP  (LMP Unknown)   SpO2 98%   BMI 31.83 kg/m²    Wt Readings from Last 4 Encounters:   11/26/24 78.9 kg (174 lb)   11/05/24 80.1 kg (176 lb 8 oz)   11/01/24 78.7 kg (173 lb 6.3 oz)   10/28/24 77.6 kg (171 lb 1.2 oz)      Constitutional: NAD, Atraumatic, Conversant   HEENT: MMM, Sclera anicteric, No JVD   Cardiovascular: RRR, no murmurs noted, no chest tenderness to palpation, 2+ radial pulses b/l  Pulmonary: normal respiratory effort, CTAB, no crackles, wheezes  Abdominal: S/NT/ND  Musculoskeletal: No lower extremity edema noted b/l  Neurological: No gross neurological deficits  Skin: W/D/I  Psych: Appropriate affect, normal mood  Labs/Imaging/Procedures   Personally reviewed  Lab Results   Component Value Date     11/04/2024    K 4.2 11/04/2024     11/04/2024    CO2 21 (L) 11/04/2024    BUN 31 (H) 11/04/2024    CREATININE 1.9 (H) 11/04/2024    ANIONGAP 10 11/04/2024     Lab Results   Component Value Date    HGBA1C 11.5 (H) 10/27/2024     Lab Results   Component Value Date    BNP 31 10/29/2024     (H) 06/01/2024    BNP 52 08/05/2022      Lab Results   Component Value Date    WBC 7.79 10/29/2024    HGB 12.7 10/29/2024    HCT 37.3 10/29/2024    HCT 21 (L) 07/31/2020     10/29/2024    GRAN 4.9 10/29/2024    GRAN 62.5 10/29/2024     Lab Results   Component Value Date    CHOL 105 (L) 12/21/2023    HDL 25 (L) 12/21/2023    LDLCALC 40.0 (L) 12/21/2023    " "LDLCALC Invalid, Trig>400.0 02/08/2022    LDLCALC Invalid, Trig > 400 06/11/2008    TRIG 200 (H) 12/21/2023     Lab Results   Component Value Date    TSH 0.029 (L) 10/28/2024     No results found for: "APOLIPOPROTE"  No results found for: "LIPOA"     TTE:  Results for orders placed during the hospital encounter of 10/27/24    Echo    Interpretation Summary    Left Ventricle: There is normal systolic function with a visually estimated ejection fraction of 55 - 60%. Unable to assess diastolic function due to atrial fibrillation.    Right Ventricle: Normal right ventricular cavity size.    Left Atrium: Left atrium is mildly dilated.    Aortic Valve: There is moderate aortic valve sclerosis.    Mitral Valve: There is mild regurgitation with a centrally directed jet.    Pulmonary Artery: No pulmonary hypertension. The estimated pulmonary artery systolic pressure is 29 mmHg.    IVC/SVC: Normal venous pressure at 3 mmHg.    Stress Testing:   Results for orders placed during the hospital encounter of 02/07/22    Nuclear Stress Test    Interpretation Summary    The EKG portion of this study is negative for ischemia.    The patient reported no chest pain during the stress test.    There were no arrhythmias during stress.    The nuclear portion of this study will be reported separately.     Coronary Angiogram:  No results found for this or any previous visit.    -Reviewing Medical records & lab results  -Independently reviewing and interpreting (if not documented by myself) EKGs, echocardiograms, catherizations   -Obtaining a history, performing a relevant exam, counseling/educating the patient/family  -Documenting clinical information in the EMR including ordering of tests  -Care coordination and communicating with other health care providers       Problem List:     1. Type 2 diabetes mellitus without complication, with long-term current use of insulin    2. Atrial fibrillation, unspecified type    3. Essential hypertension  "   4. Stage 3a chronic kidney disease      Assessment/Plan:   Azucena Morrison is a pleasant 74 y.o. female with PMH of afib on oac, HTN, CKD3a, T2DM (A1c 10.7%, on insulin) here to establish care with a new provider.     #AFIb- on OAC tolerating w/o any side effects. Rate controlled with BB. No recent falls or any s/s low h/h.     #Type 2 DM without complication- per PCP.      #Hypertension- BP controlled. Continue current medications      #Hyperlipidemia- at goal. Continue statin         Preventative Care:  Lipids:  PVD(V/A)      Patient expressed verbal understanding and agreed with the plan     Return sooner for concerns or questions. If symptoms persist go to the ED.  I have reviewed all pertinent data including patient's medical history in detail and updated the computerized patient record.     Total time spent counseling greater than fifty percent of total visit time.  Counseling included discussion regarding imaging findings, diagnosis, possibilities, treatment options, risks and benefits.      Thank you for the opportunity to care for this patient. Please don't hesitate to reach out with any questions/concerns         Aaron Bradford MD  Cardiovascular Disease; Interventional Cardiology  [facility]

## 2024-12-03 PROBLEM — E11.65 POORLY CONTROLLED DIABETES MELLITUS: Status: ACTIVE | Noted: 2024-12-03

## 2024-12-03 PROBLEM — I10 PRIMARY HYPERTENSION: Status: ACTIVE | Noted: 2024-12-03

## 2025-02-23 NOTE — PROGRESS NOTES
Cardiology Clinic Visit    History of Present Illness:       Azucena Morrison is a pleasant 74 y.o. female with PMH of afib on oac, HTN, CKD3a, T2DM (A1c 10.7%, on insulin) here for follow up. She voiced no CV complaints. Tolerating meds w/o s/e and controlling her DM. Her only concern is her urinary symptoms she requested UA/Ucx because she in the hospital and would be easier to get it done after her appointment instead of going to her PCP.  No recents falls or side effects of OAC. Denies cp, sob, palpitations, presyncope/dizziness, syncope, orthopnea, PND, bendopnea, decrease ET, NVDC, fever, chills.      Echo  10/28/24    Left Ventricle: There is normal systolic function with a visually estimated ejection fraction of 55 - 60%. Unable to assess diastolic function due to atrial fibrillation.    Right Ventricle: Normal right ventricular cavity size.    Left Atrium: Left atrium is mildly dilated.    Aortic Valve: There is moderate aortic valve sclerosis.    Mitral Valve: There is mild regurgitation with a centrally directed jet.    Pulmonary Artery: No pulmonary hypertension. The estimated pulmonary artery systolic pressure is 29 mmHg.    IVC/SVC: Normal venous pressure at 3 mmHg.    History obtained by patient interview and supplemented by nursing documentation and chart review.   PMHx:  has a past medical history of Chronic kidney disease, stage 3, Diabetes mellitus, Diabetes mellitus, type 2, Hematuria, unspecified, Hypercalcemia, Hypertension, Malnutrition of moderate degree (2020), Mixed hyperlipidemia (2022), Proteinuria, Renal cyst, left, and UTI (urinary tract infection) (2020).   SurgHx:  has a past surgical history that includes  section, classic; Cholecystectomy (); Incisional hernia repair (Right, ); Colostomy (Left, ); Colon surgery (); Colonoscopy (N/A, 2018); Incision of abdominal wall (N/A, 8/10/2018); Cystoscopy with ureteroscopy, retrograde  "pyelography, and insertion of stent (Left, 2/22/2019); Incision and drainage of abscess (N/A, 12/22/2019); Low anterior resection of colon (N/A, 7/31/2020); Ileostomy (7/31/2020); Lysis of adhesions (7/31/2020); Cystoscopy (N/A, 7/31/2020); Catheterization of ureter (Bilateral, 7/31/2020); Hysteroscopy with dilation and curettage of uterus (N/A, 12/9/2020); closure, ileostomy, open (N/A, 2/9/2021); Flexible sigmoidoscopy (N/A, 2/9/2021); Abdominal surgery; Hysteroscopy with dilation and curettage of uterus (N/A, 3/4/2022); and Surgical removal of mass of lower extremity (Bilateral, 10/13/2023).   FamHx: family history includes Alcohol abuse in her father; Diabetes in her brother and sister; Early death in her grandchild; Heart disease in her mother and sister; Hyperlipidemia in her mother and sister; Hypertension in her mother and sister; Stroke in her mother and sister.   SocialHx:  reports that she quit smoking about 25 years ago. Her smoking use included cigarettes. She has never used smokeless tobacco. She reports that she does not drink alcohol and does not use drugs.  Home Meds:  Current Outpatient Medications   Medication Instructions    BD ONEIL 2ND GEN PEN NEEDLE 32 gauge x 5/32" Ndle 1 Needle, Intradermal, 3 times daily    blood sugar diagnostic Strp 100 each, Misc.(Non-Drug; Combo Route), Daily    carvedilol PHOSPHATE 40 MG ORAL CM24 (COREG CR) 40 mg, Oral, Daily    diclofenac sodium (VOLTAREN) 2 g, Topical (Top), 4 times daily    ELIQUIS 5 mg, Oral, 2 times daily    fenofibrate micronized (LOFIBRA) 200 mg, With breakfast    finerenone (KERENDIA) 10 mg Tab 1 tablet, Oral, Daily    icosapent ethyl (VASCEPA ORAL) 1 mg    JARDIANCE 25 mg, Oral, Daily    ketoconazole (NIZORAL) 2 % cream Topical (Top), Daily    LANTUS SOLOSTAR U-100 INSULIN 25 Units, Subcutaneous, 2 times daily    MOUNJARO 7.5 mg, Subcutaneous, Every Sunday    olmesartan (BENICAR) 40 mg, Daily    pantoprazole (PROTONIX) 40 mg, Oral, Daily    " "pravastatin (PRAVACHOL) 40 mg, Nightly    rosuvastatin (CRESTOR) 40 mg, Nightly    sodium bicarbonate 650 mg, Oral, 3 times daily    solifenacin (VESICARE) 5 mg, Daily    triamcinolone acetonide 0.1% (KENALOG) 0.1 % ointment Topical (Top), 2 times daily    TRUE METRIX GLUCOSE TEST STRIP Strp 1 strip, Intradermal, 3 times daily    TRUEPLUS LANCETS 30 gauge Misc 1 lancet , Intradermal, 3 times daily       Review of Systems: Comprehensive ROS was performed and is negative unless otherwise noted in HPI.   Objective   Objective/Exam:   BP (!) 144/75 (BP Location: Right arm, Patient Position: Sitting)   Pulse 76   Ht 5' 2" (1.575 m)   Wt 77.5 kg (170 lb 13.7 oz)   LMP  (LMP Unknown)   SpO2 97%   BMI 31.25 kg/m²    Wt Readings from Last 4 Encounters:   02/25/25 77.5 kg (170 lb 13.7 oz)   11/26/24 78.9 kg (174 lb)   11/05/24 80.1 kg (176 lb 8 oz)   11/01/24 78.7 kg (173 lb 6.3 oz)      Constitutional: NAD, Atraumatic, Conversant   HEENT: MMM, Sclera anicteric, No JVD   Cardiovascular: RRR, no murmurs noted, no chest tenderness to palpation, 2+ radial pulses b/l  Pulmonary: normal respiratory effort, CTAB, no crackles, wheezes  Abdominal: S/NT/ND  Musculoskeletal: No lower extremity edema noted b/l  Neurological: No gross neurological deficits  Skin: W/D/I  Psych: Appropriate affect, normal mood  Labs/Imaging/Procedures   Personally reviewed  Lab Results   Component Value Date     11/04/2024    K 4.2 11/04/2024     11/04/2024    CO2 21 (L) 11/04/2024    BUN 31 (H) 11/04/2024    CREATININE 1.9 (H) 11/04/2024    ANIONGAP 10 11/04/2024     Lab Results   Component Value Date    HGBA1C 11.5 (H) 10/27/2024     Lab Results   Component Value Date    BNP 31 10/29/2024     (H) 06/01/2024    BNP 52 08/05/2022      Lab Results   Component Value Date    WBC 7.79 10/29/2024    HGB 12.7 10/29/2024    HCT 37.3 10/29/2024    HCT 21 (L) 07/31/2020     10/29/2024    GRAN 4.9 10/29/2024    GRAN 62.5 10/29/2024 " "    Lab Results   Component Value Date    CHOL 105 (L) 12/21/2023    HDL 25 (L) 12/21/2023    LDLCALC 40.0 (L) 12/21/2023    LDLCALC Invalid, Trig>400.0 02/08/2022    LDLCALC Invalid, Trig > 400 06/11/2008    TRIG 200 (H) 12/21/2023     Lab Results   Component Value Date    TSH 0.029 (L) 10/28/2024     No results found for: "APOLIPOPROTE"  No results found for: "LIPOA"     TTE:  Results for orders placed during the hospital encounter of 10/27/24    Echo    Interpretation Summary    Left Ventricle: There is normal systolic function with a visually estimated ejection fraction of 55 - 60%. Unable to assess diastolic function due to atrial fibrillation.    Right Ventricle: Normal right ventricular cavity size.    Left Atrium: Left atrium is mildly dilated.    Aortic Valve: There is moderate aortic valve sclerosis.    Mitral Valve: There is mild regurgitation with a centrally directed jet.    Pulmonary Artery: No pulmonary hypertension. The estimated pulmonary artery systolic pressure is 29 mmHg.    IVC/SVC: Normal venous pressure at 3 mmHg.    Stress Testing:   Results for orders placed during the hospital encounter of 02/07/22    Nuclear Stress Test    Interpretation Summary    The EKG portion of this study is negative for ischemia.    The patient reported no chest pain during the stress test.    There were no arrhythmias during stress.    The nuclear portion of this study will be reported separately.     Coronary Angiogram:  No results found for this or any previous visit.    -Reviewing Medical records & lab results  -Independently reviewing and interpreting (if not documented by myself) EKGs, echocardiograms, catherizations   -Obtaining a history, performing a relevant exam, counseling/educating the patient/family  -Documenting clinical information in the EMR including ordering of tests  -Care coordination and communicating with other health care providers       Problem List:     1. Atrial fibrillation, unspecified " type    2. Type 2 diabetes mellitus without complication, with long-term current use of insulin    3. Essential hypertension    4. Stage 3a chronic kidney disease    5. Urinary frequency    6. Mixed hyperlipidemia        Assessment/Plan:   Azucena Morrison is a pleasant 74 y.o. female with PMH of afib on oac, HTN, CKD3a, T2DM (A1c 10.7%, on insulin) here to establish care with a new provider.     #AFIb- on OAC tolerating w/o any side effects. Rate controlled with BB. No recent falls or any s/s low h/h.     #Type 2 DM without complication- per PCP.      #Hypertension- BP controlled. Continue current medications      #Hyperlipidemia- at goal. Continue statin     #urinary frequency- UA ordered         Preventative Care:  Lipids:  PVD(V/A)      Patient expressed verbal understanding and agreed with the plan     Return sooner for concerns or questions. If symptoms persist go to the ED.  I have reviewed all pertinent data including patient's medical history in detail and updated the computerized patient record.     Total time spent counseling greater than fifty percent of total visit time.  Counseling included discussion regarding imaging findings, diagnosis, possibilities, treatment options, risks and benefits.      Thank you for the opportunity to care for this patient. Please don't hesitate to reach out with any questions/concerns         Aaron Bradford MD  Cardiovascular Disease; Interventional Cardiology  [facility]

## 2025-02-25 ENCOUNTER — OFFICE VISIT (OUTPATIENT)
Dept: CARDIOLOGY | Facility: CLINIC | Age: 75
End: 2025-02-25
Payer: MEDICARE

## 2025-02-25 ENCOUNTER — LAB VISIT (OUTPATIENT)
Dept: LAB | Facility: HOSPITAL | Age: 75
End: 2025-02-25
Attending: STUDENT IN AN ORGANIZED HEALTH CARE EDUCATION/TRAINING PROGRAM
Payer: MEDICARE

## 2025-02-25 VITALS
WEIGHT: 170.88 LBS | OXYGEN SATURATION: 97 % | SYSTOLIC BLOOD PRESSURE: 144 MMHG | HEART RATE: 76 BPM | DIASTOLIC BLOOD PRESSURE: 75 MMHG | BODY MASS INDEX: 31.45 KG/M2 | HEIGHT: 62 IN

## 2025-02-25 DIAGNOSIS — E11.9 TYPE 2 DIABETES MELLITUS WITHOUT COMPLICATION, WITH LONG-TERM CURRENT USE OF INSULIN: ICD-10-CM

## 2025-02-25 DIAGNOSIS — R35.0 URINARY FREQUENCY: ICD-10-CM

## 2025-02-25 DIAGNOSIS — Z79.4 TYPE 2 DIABETES MELLITUS WITHOUT COMPLICATION, WITH LONG-TERM CURRENT USE OF INSULIN: ICD-10-CM

## 2025-02-25 DIAGNOSIS — E78.2 MIXED HYPERLIPIDEMIA: ICD-10-CM

## 2025-02-25 DIAGNOSIS — I10 ESSENTIAL HYPERTENSION: ICD-10-CM

## 2025-02-25 DIAGNOSIS — N30.01 ACUTE CYSTITIS WITH HEMATURIA: ICD-10-CM

## 2025-02-25 DIAGNOSIS — R31.9 HEMATURIA, UNSPECIFIED TYPE: ICD-10-CM

## 2025-02-25 DIAGNOSIS — R35.0 URINARY FREQUENCY: Primary | ICD-10-CM

## 2025-02-25 DIAGNOSIS — I48.91 ATRIAL FIBRILLATION, UNSPECIFIED TYPE: Primary | ICD-10-CM

## 2025-02-25 DIAGNOSIS — N18.31 STAGE 3A CHRONIC KIDNEY DISEASE: ICD-10-CM

## 2025-02-25 LAB
BACTERIA #/AREA URNS HPF: NORMAL /HPF
BILIRUB UR QL STRIP: NEGATIVE
CLARITY UR: CLEAR
COLOR UR: COLORLESS
GLUCOSE UR QL STRIP: ABNORMAL
HGB UR QL STRIP: NEGATIVE
HYALINE CASTS #/AREA URNS LPF: 0 /LPF
KETONES UR QL STRIP: NEGATIVE
LEUKOCYTE ESTERASE UR QL STRIP: NEGATIVE
MICROSCOPIC COMMENT: NORMAL
NITRITE UR QL STRIP: NEGATIVE
PH UR STRIP: 8 [PH] (ref 5–8)
PROT UR QL STRIP: ABNORMAL
RBC #/AREA URNS HPF: 1 /HPF (ref 0–4)
SP GR UR STRIP: 1.02 (ref 1–1.03)
URN SPEC COLLECT METH UR: ABNORMAL
UROBILINOGEN UR STRIP-ACNC: NEGATIVE EU/DL
WBC #/AREA URNS HPF: 0 /HPF (ref 0–5)
YEAST URNS QL MICRO: NORMAL

## 2025-02-25 PROCEDURE — 87086 URINE CULTURE/COLONY COUNT: CPT | Performed by: STUDENT IN AN ORGANIZED HEALTH CARE EDUCATION/TRAINING PROGRAM

## 2025-02-25 PROCEDURE — 99999 PR PBB SHADOW E&M-EST. PATIENT-LVL II: CPT | Mod: PBBFAC,,, | Performed by: STUDENT IN AN ORGANIZED HEALTH CARE EDUCATION/TRAINING PROGRAM

## 2025-02-25 PROCEDURE — 4010F ACE/ARB THERAPY RXD/TAKEN: CPT | Mod: CPTII,S$GLB,, | Performed by: STUDENT IN AN ORGANIZED HEALTH CARE EDUCATION/TRAINING PROGRAM

## 2025-02-25 PROCEDURE — 3008F BODY MASS INDEX DOCD: CPT | Mod: CPTII,S$GLB,, | Performed by: STUDENT IN AN ORGANIZED HEALTH CARE EDUCATION/TRAINING PROGRAM

## 2025-02-25 PROCEDURE — 1101F PT FALLS ASSESS-DOCD LE1/YR: CPT | Mod: CPTII,S$GLB,, | Performed by: STUDENT IN AN ORGANIZED HEALTH CARE EDUCATION/TRAINING PROGRAM

## 2025-02-25 PROCEDURE — 99215 OFFICE O/P EST HI 40 MIN: CPT | Mod: S$GLB,,, | Performed by: STUDENT IN AN ORGANIZED HEALTH CARE EDUCATION/TRAINING PROGRAM

## 2025-02-25 PROCEDURE — 81000 URINALYSIS NONAUTO W/SCOPE: CPT | Performed by: STUDENT IN AN ORGANIZED HEALTH CARE EDUCATION/TRAINING PROGRAM

## 2025-02-25 PROCEDURE — 3077F SYST BP >= 140 MM HG: CPT | Mod: CPTII,S$GLB,, | Performed by: STUDENT IN AN ORGANIZED HEALTH CARE EDUCATION/TRAINING PROGRAM

## 2025-02-25 PROCEDURE — 1126F AMNT PAIN NOTED NONE PRSNT: CPT | Mod: CPTII,S$GLB,, | Performed by: STUDENT IN AN ORGANIZED HEALTH CARE EDUCATION/TRAINING PROGRAM

## 2025-02-25 PROCEDURE — 3288F FALL RISK ASSESSMENT DOCD: CPT | Mod: CPTII,S$GLB,, | Performed by: STUDENT IN AN ORGANIZED HEALTH CARE EDUCATION/TRAINING PROGRAM

## 2025-02-25 PROCEDURE — 81003 URINALYSIS AUTO W/O SCOPE: CPT | Performed by: STUDENT IN AN ORGANIZED HEALTH CARE EDUCATION/TRAINING PROGRAM

## 2025-02-25 PROCEDURE — 3078F DIAST BP <80 MM HG: CPT | Mod: CPTII,S$GLB,, | Performed by: STUDENT IN AN ORGANIZED HEALTH CARE EDUCATION/TRAINING PROGRAM

## 2025-02-26 ENCOUNTER — RESULTS FOLLOW-UP (OUTPATIENT)
Dept: CARDIOLOGY | Facility: CLINIC | Age: 75
End: 2025-02-26

## 2025-02-27 ENCOUNTER — TELEPHONE (OUTPATIENT)
Dept: CARDIOLOGY | Facility: CLINIC | Age: 75
End: 2025-02-27
Payer: MEDICARE

## 2025-02-27 LAB — BACTERIA UR CULT: NORMAL

## 2025-03-04 NOTE — ASSESSMENT & PLAN NOTE
70 y.o. female 21 Days Post-Op for Procedure(s) (LRB):  RESECTION, COLON, LOW ANTERIOR (N/A)  CYSTOSCOPY (N/A)  CATHETERIZATION, URETER (Bilateral)  CREATION, ILEOSTOMY  LYSIS, ADHESIONS   - IR drain placed 8/6: cultures growing Ecoli sensitive to Ertapenem (also has a UTI with the same Ecoli). Drain upsized  By IR on 8/12  - IR drain placed 8/17 remains in place, previous drain removed  - IV antibiotics switched to meropenem by ID and patient placed on contact precautions due to ESBL. Appreciate ID recommendations, continue therapy per recommendations. Will set up patient for IV abx for discharge  - Continue diabetic diet as tolerated  - continue nausea control w scopolamine patch  - will DC TPN today  - SCDs/DVT prophylaxis  - GI prophylaxis  - Encourage IS  - PT/OT. Encourage to chair and ambulation.  - f/u with social work regarding potential discharge Friday  - imodium increased due to loose stool  - will speak to SW regarding discharge. Family of patient no longer believes home is most suitable environment for patient, so will try and get patient approved for LTAC prior to return home      Continue communication with use of      04-Mar-2025 18:38

## 2025-03-31 DIAGNOSIS — Z12.31 ENCOUNTER FOR SCREENING MAMMOGRAM FOR MALIGNANT NEOPLASM OF BREAST: Primary | ICD-10-CM

## 2025-04-01 ENCOUNTER — HOSPITAL ENCOUNTER (OUTPATIENT)
Dept: RADIOLOGY | Facility: HOSPITAL | Age: 75
Discharge: HOME | End: 2025-04-01
Payer: MEDICARE

## 2025-04-01 DIAGNOSIS — Z12.31 ENCOUNTER FOR SCREENING MAMMOGRAM FOR MALIGNANT NEOPLASM OF BREAST: ICD-10-CM

## 2025-04-01 PROCEDURE — 77067 SCR MAMMO BI INCL CAD: CPT | Mod: TC

## 2025-04-01 PROCEDURE — 77067 SCR MAMMO BI INCL CAD: CPT | Mod: 26,,, | Performed by: RADIOLOGY

## 2025-04-01 PROCEDURE — 77063 BREAST TOMOSYNTHESIS BI: CPT | Mod: 26,,, | Performed by: RADIOLOGY

## 2025-04-13 ENCOUNTER — HOSPITAL ENCOUNTER (EMERGENCY)
Facility: HOSPITAL | Age: 75
Discharge: HOME | End: 2025-04-13
Attending: EMERGENCY MEDICINE
Payer: MEDICARE

## 2025-04-13 VITALS
OXYGEN SATURATION: 98 % | TEMPERATURE: 98 F | DIASTOLIC BLOOD PRESSURE: 99 MMHG | RESPIRATION RATE: 17 BRPM | BODY MASS INDEX: 30.91 KG/M2 | WEIGHT: 168 LBS | HEIGHT: 62 IN | SYSTOLIC BLOOD PRESSURE: 153 MMHG | HEART RATE: 77 BPM

## 2025-04-13 DIAGNOSIS — R10.84 GENERALIZED ABDOMINAL PAIN: ICD-10-CM

## 2025-04-13 LAB
ABSOLUTE EOSINOPHIL (OHS): 0.1 K/UL
ABSOLUTE MONOCYTE (OHS): 0.5 K/UL (ref 0.3–1)
ABSOLUTE NEUTROPHIL COUNT (OHS): 4.95 K/UL (ref 1.8–7.7)
ALBUMIN SERPL BCP-MCNC: 3.8 G/DL (ref 3.5–5.2)
ALP SERPL-CCNC: 56 UNIT/L (ref 40–150)
ALT SERPL W/O P-5'-P-CCNC: 13 UNIT/L (ref 10–44)
ANION GAP (OHS): 14 MMOL/L (ref 8–16)
AST SERPL-CCNC: 18 UNIT/L (ref 11–45)
BACTERIA #/AREA URNS AUTO: ABNORMAL /HPF
BASOPHILS # BLD AUTO: 0.03 K/UL
BASOPHILS NFR BLD AUTO: 0.4 %
BILIRUB SERPL-MCNC: 0.4 MG/DL (ref 0.1–1)
BILIRUB UR QL STRIP.AUTO: NEGATIVE
BUN SERPL-MCNC: 25 MG/DL (ref 8–23)
CALCIUM SERPL-MCNC: 9.8 MG/DL (ref 8.7–10.5)
CHLORIDE SERPL-SCNC: 104 MMOL/L (ref 95–110)
CLARITY UR: CLEAR
CO2 SERPL-SCNC: 20 MMOL/L (ref 23–29)
COLOR UR AUTO: YELLOW
CREAT SERPL-MCNC: 1.7 MG/DL (ref 0.5–1.4)
ERYTHROCYTE [DISTWIDTH] IN BLOOD BY AUTOMATED COUNT: 13.5 % (ref 11.5–14.5)
GFR SERPLBLD CREATININE-BSD FMLA CKD-EPI: 32 ML/MIN/1.73/M2
GLUCOSE SERPL-MCNC: 226 MG/DL (ref 70–110)
GLUCOSE UR QL STRIP: ABNORMAL
HCT VFR BLD AUTO: 42.2 % (ref 37–48.5)
HGB BLD-MCNC: 14.5 GM/DL (ref 12–16)
HGB UR QL STRIP: NEGATIVE
HYALINE CASTS UR QL AUTO: 0 /LPF (ref 0–1)
IMM GRANULOCYTES # BLD AUTO: 0.02 K/UL (ref 0–0.04)
IMM GRANULOCYTES NFR BLD AUTO: 0.3 % (ref 0–0.5)
KETONES UR QL STRIP: NEGATIVE
LACTATE SERPL-SCNC: 1.6 MMOL/L (ref 0.5–2.2)
LEUKOCYTE ESTERASE UR QL STRIP: NEGATIVE
LIPASE SERPL-CCNC: 44 U/L (ref 4–60)
LYMPHOCYTES # BLD AUTO: 1.84 K/UL (ref 1–4.8)
MCH RBC QN AUTO: 28 PG (ref 27–31)
MCHC RBC AUTO-ENTMCNC: 34.4 G/DL (ref 32–36)
MCV RBC AUTO: 82 FL (ref 82–98)
MICROSCOPIC COMMENT: ABNORMAL
NITRITE UR QL STRIP: NEGATIVE
NUCLEATED RBC (/100WBC) (OHS): 0 /100 WBC
PH UR STRIP: 6 [PH]
PLATELET # BLD AUTO: 262 K/UL (ref 150–450)
PMV BLD AUTO: 9.2 FL (ref 9.2–12.9)
POTASSIUM SERPL-SCNC: 3.7 MMOL/L (ref 3.5–5.1)
PROT SERPL-MCNC: 8.2 GM/DL (ref 6–8.4)
PROT UR QL STRIP: ABNORMAL
RBC # BLD AUTO: 5.18 M/UL (ref 4–5.4)
RBC #/AREA URNS AUTO: 1 /HPF (ref 0–4)
RELATIVE EOSINOPHIL (OHS): 1.3 %
RELATIVE LYMPHOCYTE (OHS): 24.7 % (ref 18–48)
RELATIVE MONOCYTE (OHS): 6.7 % (ref 4–15)
RELATIVE NEUTROPHIL (OHS): 66.6 % (ref 38–73)
SODIUM SERPL-SCNC: 138 MMOL/L (ref 136–145)
SP GR UR STRIP: 1.03
SQUAMOUS #/AREA URNS AUTO: 1 /HPF
TROPONIN I SERPL DL<=0.01 NG/ML-MCNC: 0.02 NG/ML
UROBILINOGEN UR STRIP-ACNC: NEGATIVE EU/DL
WBC # BLD AUTO: 7.44 K/UL (ref 3.9–12.7)
WBC #/AREA URNS AUTO: 8 /HPF (ref 0–5)
YEAST UR QL AUTO: ABNORMAL /HPF

## 2025-04-13 PROCEDURE — 85025 COMPLETE CBC W/AUTO DIFF WBC: CPT | Performed by: EMERGENCY MEDICINE

## 2025-04-13 PROCEDURE — 80053 COMPREHEN METABOLIC PANEL: CPT | Performed by: EMERGENCY MEDICINE

## 2025-04-13 PROCEDURE — 83605 ASSAY OF LACTIC ACID: CPT | Performed by: EMERGENCY MEDICINE

## 2025-04-13 PROCEDURE — 99285 EMERGENCY DEPT VISIT HI MDM: CPT | Mod: 25

## 2025-04-13 PROCEDURE — 81003 URINALYSIS AUTO W/O SCOPE: CPT | Performed by: EMERGENCY MEDICINE

## 2025-04-13 PROCEDURE — 93005 ELECTROCARDIOGRAM TRACING: CPT

## 2025-04-13 PROCEDURE — 83690 ASSAY OF LIPASE: CPT | Performed by: EMERGENCY MEDICINE

## 2025-04-13 PROCEDURE — 96375 TX/PRO/DX INJ NEW DRUG ADDON: CPT

## 2025-04-13 PROCEDURE — 63600175 PHARM REV CODE 636 W HCPCS: Performed by: EMERGENCY MEDICINE

## 2025-04-13 PROCEDURE — 84484 ASSAY OF TROPONIN QUANT: CPT | Performed by: EMERGENCY MEDICINE

## 2025-04-13 PROCEDURE — 96374 THER/PROPH/DIAG INJ IV PUSH: CPT

## 2025-04-13 RX ORDER — ONDANSETRON HYDROCHLORIDE 2 MG/ML
4 INJECTION, SOLUTION INTRAVENOUS
Status: COMPLETED | OUTPATIENT
Start: 2025-04-13 | End: 2025-04-13

## 2025-04-13 RX ORDER — CEPHALEXIN 500 MG/1
500 CAPSULE ORAL EVERY 12 HOURS
Qty: 14 CAPSULE | Refills: 0 | Status: ON HOLD | OUTPATIENT
Start: 2025-04-13 | End: 2025-04-20

## 2025-04-13 RX ORDER — ACETAMINOPHEN 500 MG
500 TABLET ORAL EVERY 6 HOURS PRN
Qty: 20 TABLET | Refills: 0 | Status: ON HOLD | OUTPATIENT
Start: 2025-04-13

## 2025-04-13 RX ORDER — ONDANSETRON 4 MG/1
4 TABLET, ORALLY DISINTEGRATING ORAL EVERY 6 HOURS PRN
Qty: 20 TABLET | Refills: 0 | Status: ON HOLD | OUTPATIENT
Start: 2025-04-13

## 2025-04-13 RX ORDER — POLYETHYLENE GLYCOL 3350 17 G/17G
17 POWDER, FOR SOLUTION ORAL DAILY
Qty: 30 EACH | Refills: 0 | Status: ON HOLD | OUTPATIENT
Start: 2025-04-13

## 2025-04-13 RX ORDER — DOCUSATE SODIUM 100 MG/1
100 CAPSULE, LIQUID FILLED ORAL 2 TIMES DAILY
Qty: 60 CAPSULE | Refills: 0 | Status: ON HOLD | OUTPATIENT
Start: 2025-04-13

## 2025-04-13 RX ORDER — MORPHINE SULFATE 10 MG/ML
5 INJECTION INTRAMUSCULAR; INTRAVENOUS; SUBCUTANEOUS
Status: COMPLETED | OUTPATIENT
Start: 2025-04-13 | End: 2025-04-13

## 2025-04-13 RX ADMIN — MORPHINE SULFATE 5 MG: 10 INJECTION, SOLUTION INTRAMUSCULAR; INTRAVENOUS at 04:04

## 2025-04-13 RX ADMIN — ONDANSETRON 4 MG: 2 INJECTION INTRAMUSCULAR; INTRAVENOUS at 04:04

## 2025-04-13 NOTE — DISCHARGE INSTRUCTIONS
¡Kristi por elegir el [facility]! Agradecemos que nos confíe herbert atención médica.    Es importante recordar que algunos problemas son difíciles de diagnosticar y es posible que no se detecten kate la primera visita. Asegúrese de hacer un seguimiento con herbert médico de atención primaria y revisar cualquier análisis de laboratorio/imágenes que se haya realizado kate herbert visita con él. Si no tiene un médico de atención primaria, puede comunicarse con el que figura en herbert documentación de wally, o también puede llamar al mostrador de citas de la [facility] al [phone] para programar tre dnaiel.    Todos los medicamentos pueden tener efectos secundarios y es imposible predecir qué medicamentos pueden provocar efectos secundarios. Si siente que está teniendo un efecto negativo de algún medicamento, debe dejar de tomarlo inmediatamente y buscar atención médica. No conduzca ni tome decisiones importantes kate 24 horas si ha recibido analgésicos, sedantes o medicamentos que alteran el estado de ánimo kate herbert visita a urgencias.    Estaremos encantados de atenderle para todas jackie necesidades médicas futuras. Puede regresar a la austen de emergencias en cualquier momento si presenta algún síntoma nuevo o preocupante, si herbert condición empeora o si no mejora. Esperamos que te sientas mejor pronto.    David Sim MD MPH  Medicina de emergencia

## 2025-04-13 NOTE — ED PROVIDER NOTES
Encounter Date: 2025       History     Chief Complaint   Patient presents with    Abdominal Pain     LLQ pain x 4 days with diarrhea and nausea at night. No changes in appetite. Increased urination at night. Taking tylenol for pain, last dose this morning, without relief.     Azucena Morrison is a 74 y.o. female who  has a past medical history of Chronic kidney disease, stage 3, Diabetes mellitus, Diabetes mellitus, type 2, Hematuria, unspecified, Hypercalcemia, Hypertension, Malnutrition of moderate degree (2020), Mixed hyperlipidemia (2022), Proteinuria, Renal cyst, left, and UTI (urinary tract infection) (2020).    The patient presents to the ED due to abdominal pain she has been having abdominal pain for the past 4 days.  She describes nausea with out vomiting.  Her bowel movements have been decreased.  She describes pain to her left lower quadrant and upper abdomen.  She has a history of multiple abdominal surgeries.  She denies any chest pain shortness of breath dizziness urinary complaints.      Review of patient's allergies indicates:   Allergen Reactions    Chlorhexidine gluconate Rash     Chlorhexidine/alcohol wipes. Rash and blistering.     Past Medical History:   Diagnosis Date    Chronic kidney disease, stage 3     Diabetes mellitus     Diabetes mellitus, type 2     Hematuria, unspecified     Hypercalcemia     Hypertension     Malnutrition of moderate degree 2020    Mixed hyperlipidemia 2022    Proteinuria     Renal cyst, left     UTI (urinary tract infection) 2020     Past Surgical History:   Procedure Laterality Date    ABDOMINAL SURGERY      CATHETERIZATION OF URETER Bilateral 2020    Procedure: CATHETERIZATION, URETER;  Surgeon: Kvng Cunningham MD;  Location: [facility] OR [address] [place] [address];  Service: Urology;  Laterality: Bilateral;     SECTION, CLASSIC      x2    CHOLECYSTECTOMY      CLOSURE, ILEOSTOMY, OPEN N/A 2021    Procedure: CLOSURE,  ILEOSTOMY, OPEN, ERAS low;  Surgeon: Fabiana Valiente MD;  Location: [facility] OR [facility] FLR;  Service: Colon and Rectal;  Laterality: N/A;    COLON SURGERY  2015    [facility]    COLONOSCOPY N/A 6/4/2018    Procedure: COLONOSCOPY;  Surgeon: Gibran Aguayo MD;  Location: [facility] ENDO;  Service: Endoscopy;  Laterality: N/A;    COLOSTOMY Left 2015    CYSTOSCOPY N/A 7/31/2020    Procedure: CYSTOSCOPY;  Surgeon: Kvng Cunningham MD;  [facility];  Service: Urology;  Laterality: N/A;    CYSTOSCOPY WITH URETEROSCOPY, RETROGRADE PYELOGRAPHY, AND INSERTION OF STENT Left 2/22/2019    Procedure: CYSTOSCOPY, WITH RETROGRADE PYELOGRAM AND URETERAL STENT INSERTION with placement of a muir cath;  Surgeon: Ulisses Horton MD;  Location: [facility] OR;  Service: General;  Laterality: Left;    FLEXIBLE SIGMOIDOSCOPY N/A 2/9/2021    Procedure: SIGMOIDOSCOPY, FLEXIBLE;  Surgeon: Fabiana Valiente MD;  [facility];  Service: Colon and Rectal;  Laterality: N/A;    HYSTEROSCOPY WITH DILATION AND CURETTAGE OF UTERUS N/A 12/9/2020    Procedure: HYSTEROSCOPY, WITH DILATION AND CURETTAGE OF UTERUS (MYOSURE);  Surgeon: Caitlyn Feng MD;  Location: [facility] OR;  Service: OB/GYN;  Laterality: N/A;  Guido notified 11/30,   video    HYSTEROSCOPY WITH DILATION AND CURETTAGE OF UTERUS N/A 3/4/2022    Procedure: HYSTEROSCOPY, WITH DILATION AND CURETTAGE OF UTERUS;  Surgeon: Caitlyn Feng MD;  Location: [facility] OR;  Service: OB/GYN;  Laterality: N/A;  Alexander notified CW 3/3  video confirmed    ILEOSTOMY  7/31/2020    Procedure: CREATION, ILEOSTOMY;  Surgeon: Fabiana Valiente MD;  [facility];  Service: Colon and Rectal;;    INCISION AND DRAINAGE OF ABSCESS N/A 12/22/2019    Procedure: INCISION AND DRAINAGE, ABSCESS;  Surgeon: Ulisses Horton MD;  Location: [facility] OR;  Service: General;  Laterality: N/A;    INCISION OF ABDOMINAL WALL N/A 8/10/2018    Procedure: INCISION, ABDOMINAL WALL;  Surgeon: Ulisses ABAD  MD Clifford;  Location: [facility] OR;  Service: General;  Laterality: N/A;    INCISIONAL HERNIA REPAIR Right 2010    LOW ANTERIOR RESECTION OF COLON N/A 7/31/2020    Procedure: RESECTION, COLON, LOW ANTERIOR;  Surgeon: Fabiana Valiente MD;  Location: [facility] OR 2ND FLR;  Service: Colon and Rectal;  Laterality: N/A;    LYSIS OF ADHESIONS  7/31/2020    Procedure: LYSIS, ADHESIONS;  Surgeon: Fabiana Valiente MD;  Location: [facility] OR 2ND FLR;  Service: Colon and Rectal;;    SURGICAL REMOVAL OF MASS OF LOWER EXTREMITY Bilateral 10/13/2023    Procedure: EXCISION, MASS ruthy ankle Malleolus;  Surgeon: Ulisses Horton MD;  Location: [facility] OR;  Service: General;  Laterality: Bilateral;     Family History   Problem Relation Name Age of Onset    Stroke Mother      Heart disease Mother      Hyperlipidemia Mother      Hypertension Mother      Alcohol abuse Father      Stroke Sister      Diabetes Sister      Heart disease Sister      Hyperlipidemia Sister      Hypertension Sister      Diabetes Brother      Early death Grandchild      Anesthesia problems Neg Hx      Broken bones Neg Hx      Cancer Neg Hx       Social History[1]  Review of Systems   Constitutional:  Negative for chills and fever.   HENT:  Negative for sore throat.    Respiratory:  Negative for cough and shortness of breath.    Cardiovascular:  Negative for chest pain.   Gastrointestinal:  Positive for abdominal pain and nausea. Negative for vomiting.   Genitourinary:  Negative for dysuria, frequency and urgency.   Musculoskeletal:  Negative for back pain, neck pain and neck stiffness.   Skin:  Negative for rash and wound.   Neurological:  Negative for syncope and weakness.   Hematological:  Does not bruise/bleed easily.   Psychiatric/Behavioral:  Negative for agitation, behavioral problems and confusion.        Physical Exam     Initial Vitals [04/13/25 1557]   BP Pulse Resp Temp SpO2   (!) 191/89 85 18 98 °F (36.7 °C) 98 %      MAP       --         Physical  Exam    Constitutional: No distress.   HENT:   Head: Normocephalic and atraumatic.   Eyes: Conjunctivae are normal.   Cardiovascular:  Intact distal pulses.           Pulmonary/Chest: No respiratory distress.   Abdominal: There is abdominal tenderness.   Numerous abdominal scars  Periumbilical/left lower quadrant abdominal tenderness without rebound or guarding There is no rebound and no guarding.     Neurological: She is alert.   Skin: Skin is warm and dry.   Psychiatric: She has a normal mood and affect.         ED Course   Procedures  Labs Reviewed   COMPREHENSIVE METABOLIC PANEL - Abnormal       Result Value    Sodium 138      Potassium 3.7      Chloride 104      CO2 20 (*)     Glucose 226 (*)     BUN 25 (*)     Creatinine 1.7 (*)     Calcium 9.8      Protein Total 8.2      Albumin 3.8      Bilirubin Total 0.4      ALP 56      AST 18      ALT 13      Anion Gap 14      eGFR 32 (*)    URINALYSIS, REFLEX TO URINE CULTURE - Abnormal    Color, UA Yellow      Appearance, UA Clear      pH, UA 6.0      Spec Grav UA 1.030      Protein, UA 2+ (*)     Glucose, UA 4+ (*)     Ketones, UA Negative      Bilirubin, UA Negative      Blood, UA Negative      Nitrites, UA Negative      Urobilinogen, UA Negative      Leukocyte Esterase, UA Negative     URINALYSIS MICROSCOPIC - Abnormal    RBC, UA 1      WBC, UA 8 (*)     Bacteria, UA None      Yeast, UA None      Squamous Epithelial Cells, UA 1      Hyaline Casts, UA 0      Microscopic Comment       LACTIC ACID, PLASMA - Normal    Lactic Acid Level 1.6      Narrative:     Falsely low lactic acid results can be found in samples containing >=13.0 mg/dL total bilirubin and/or >=3.5 mg/dL direct bilirubin.    TROPONIN I - Normal    Troponin-I 0.017     LIPASE - Normal    Lipase Level 44     CBC WITH DIFFERENTIAL - Normal    WBC 7.44      RBC 5.18      HGB 14.5      HCT 42.2      MCV 82      MCH 28.0      MCHC 34.4      RDW 13.5      Platelet Count 262      MPV 9.2      Nucleated RBC 0       Neut % 66.6      Lymph % 24.7      Mono % 6.7      Eos % 1.3      Basophil % 0.4      Imm Grans % 0.3      Neut # 4.95      Lymph # 1.84      Mono # 0.50      Eos # 0.10      Baso # 0.03      Imm Grans # 0.02     CBC W/ AUTO DIFFERENTIAL    Narrative:     The following orders were created for panel order CBC auto differential.  Procedure                               Abnormality         Status                     ---------                               -----------         ------                     CBC with Differential[[id number]]       Normal              Final result                 Please view results for these tests on the individual orders.        ECG Results              EKG 12-lead (In process)        Collection Time Result Time QRS Duration OHS QTC Calculation    04/13/25 16:51:07 04/14/25 08:45:31 92 454                     In process by Interface, Lab In [facility] (04/14/25 08:45:48)                   Narrative:    Test Reason : R10.84,    Vent. Rate :  77 BPM     Atrial Rate : 340 BPM     P-R Int :    ms          QRS Dur :  92 ms      QT Int : 402 ms       P-R-T Axes :    -55  43 degrees    QTcB Int : 454 ms    Atrial fibrillation  Incomplete right bundle branch block  Left anterior fascicular block  Minimal voltage criteria for LVH, may be normal variant  Septal infarct (cited on or before 28-Oct-2024)  Abnormal ECG  When compared with ECG of 28-Oct-2024 11:35,  Atrial fibrillation has replaced Sinus rhythm  Incomplete right bundle branch block is now Present  Questionable change in initial forces of Septal leads    Referred By: AAAREFERRAL SELF           Confirmed By:                                   Imaging Results               CT Abdomen Pelvis  Without Contrast (Final result)  Result time 04/13/25 18:17:43      Final result by Cheko Sapp MD (04/13/25 18:17:43)                   Impression:      This report was flagged in Epic as abnormal.    1. Surgical changes of the large and small  bowel.  There is slow flow through a few segments of small bowel noting anastomoses appear patent.  Developing colonic constipation is a consideration.  Correlation is advised.  Please note, there are a few fluid distended mid small bowel loops.  This also suggests slow flow without high-grade obstruction.  Correlation is advised noting developing enteritis remains a consideration.  2. Please see above for additional findings.      Electronically signed by: Cheko Sapp MD  Date:    04/13/2025  Time:    18:17               Narrative:    EXAMINATION:  CT ABDOMEN PELVIS WITHOUT CONTRAST    CLINICAL HISTORY:  Abdominal pain, acute, nonlocalized;Discussed periumbilical/left lower quadrant pain for 4 days.  History of multiple abdominal surgeries, small-bowel obstruction;    TECHNIQUE:  Low dose axial images, sagittal and coronal reformations were obtained from the lung bases to the pubic symphysis.  Oral contrast was not administered.    COMPARISON:  CT renal stone study 12/20/2023    FINDINGS:  Images of the lower thorax are remarkable for bilateral dependent atelectasis noting scattered ground-glass attenuation particularly within the left lower lobe and inferior aspect of the lingula.  Developing edema or infectious/inflammatory pneumonitis not excluded, correlation is advised.    The liver, spleen and adrenal glands have a grossly unremarkable noncontrast appearance.  The gallbladder is surgically absent, no significant biliary dilation status post cholecystectomy.  There is atrophic change of the pancreas without pancreatic ductal dilation.  The stomach is mildly distended with ingested content without wall thickening.  No significant abdominal lymphadenopathy.    There is no hydronephrosis or nephrolithiasis.  Low attenuating lesions arise from the kidneys, the majority of which are too small for characterization.  There is a cyst within the upper pole of the left kidney measuring 3.2 cm.  The bilateral ureters  are unremarkable without calculi seen.  The urinary bladder is unremarkable.  The uterus and adnexa are grossly unremarkable noting calcification along the lateral aspect of the uterus, possibly reflecting leiomyoma.    There is a large amount of stool in the rectum without rectal wall thickening.  There is surgical change of distal partial small bowel resection.  There are a few scattered colonic diverticula.  Additional surgical change is noted of the proximal large bowel, just prior to the small-bowel anastomosis.  There is surgical change of small-bowel resection, noting some degree of slow flow through the anastomoses without high-grade obstruction.  Several small bowel loops are closely tethered to the anterior abdominal wall noting diastasis of the rectus abdominus musculature.  Fat and bowel loops protrude into the defect.  There are a few scattered shotty periaortic, pericaval, and mesenteric lymph nodes.  No focal organized pelvic fluid collection.    There is osteopenia.  There are degenerative changes of the spine.  No significant inguinal lymphadenopathy.                                       Medications   morphine injection 5 mg (5 mg Intravenous Given 4/13/25 1646)   ondansetron injection 4 mg (4 mg Intravenous Given 4/13/25 1646)     Medical Decision Making  Differential Diagnosis includes, but is not limited to:  AAA, aortic dissection, mesenteric ischemia, perforated viscous, MI/ACS, SBO/volvulus, incarcerated/strangulated hernia, intussusception, ileus, appendicitis, cholecystitis, cholangitis, diverticulitis, esophagitis, hepatitis, nephrolithiasis, pancreatitis, gastroenteritis, colitis, IBD/IBS, biliary colic, GERD, PUD, constipation, UTI/pyelonephritis,  disorder.      Amount and/or Complexity of Data Reviewed  Labs: ordered. Decision-making details documented in ED Course.  Radiology: ordered.    Risk  OTC drugs.  Prescription drug management.  Decision regarding hospitalization.  Diagnosis  or treatment significantly limited by social determinants of health.  Risk Details: Discussed CT findings with Dr. Horton who is the patients surgeon and familiar with the patient. Recommends discharge, close follow up in clinic.     Patient reports improvement of symptoms  Labs reassuring, noting mild cystitis     Appears stable for DC, close follow up advised    Return precautions discussed for worsening symptoms or new symptoms of concern    After taking into careful account the historical factors and physical exam findings of the patient's presentation today, in conjunction with the empirical and objective data obtained on ED workup, no acute emergent medical condition has been identified. The patient appears to be low risk for an emergent medical condition and I feel it is safe and appropriate at this time for the patient to be discharged to follow-up as detailed in their discharge instructions for reevaluation and possible continued outpatient workup and management. I have discussed the specifics of the workup with the patient and the patient has verbalized understanding of the details of the workup, the diagnosis, the treatment plan, and the need for outpatient follow-up.  Although the patient has no emergent etiology today this does not preclude the development of an emergent condition so in addition, I have advised the patient that they can return to the ED and/or activate EMS at any time with worsening of their symptoms, change of their symptoms, or with any other medical complaint.  The patient remained comfortable and stable during their visit in the ED.  Discharge and follow-up instructions discussed with the patient who expressed understanding and willingness to comply with my recommendations.        Critical Care  Total time providing critical care: 0 minutes               ED Course as of 04/14/25 2302   Sun Apr 13, 2025   1722 CBC auto differential  No significant abnormality.    [RN]   7674  Comprehensive metabolic panel(!)  Stable [RN]   1722 Urinalysis Microscopic(!)  WBCs noted in [RN]   1723 Urinalysis, Reflex to Urine Culture(!)  +2 protein, +4 glucose [RN]   1724 Lipase  No significant abnormality.    [RN]   1734 Troponin I  No significant abnormality.    [RN]   1734 Lactic acid, plasma  No significant abnormality.    [RN]      ED Course User Index  [RN] David Sim Jr., MD                           Clinical Impression:  Final diagnoses:  [R10.84] Generalized abdominal pain          ED Disposition Condition    Discharge Stable          ED Prescriptions       Medication Sig Dispense Start Date End Date Auth. Provider    polyethylene glycol (GLYCOLAX) 17 gram PwPk Take 17 g by mouth once daily. 30 each 4/13/2025 -- David Sim Jr., MD    docusate sodium (COLACE) 100 MG capsule Take 1 capsule (100 mg total) by mouth 2 (two) times daily. 60 capsule 4/13/2025 -- David Sim Jr., MD    acetaminophen (TYLENOL) 500 MG tablet Take 1 tablet (500 mg total) by mouth every 6 (six) hours as needed for Pain. 20 tablet 4/13/2025 -- David Sim Jr., MD    cephALEXin (KEFLEX) 500 MG capsule Take 1 capsule (500 mg total) by mouth every 12 (twelve) hours. for 7 days 14 capsule 4/13/2025 4/20/2025 David Sim Jr., MD    ondansetron (ZOFRAN-ODT) 4 MG TbDL Take 1 tablet (4 mg total) by mouth every 6 (six) hours as needed. 20 tablet 4/13/2025 -- David Sim Jr., MD          Follow-up Information       Follow up With Specialties Details Why Contact Info    Pj Sanches MD Internal Medicine In 3 days  [address] [id number] [phone]      Ulisses Horton MD General Surgery, Surgery Schedule an appointment as soon as possible for a visit in 3 days  [address] [facility]  [address]  [facility] [address] [phone]              Portions of this note were dictated using voice recognition software and may contain dictation related errors in spelling/grammar/syntax not  found on text review         [1]   Social History  Tobacco Use    Smoking status: Former     Current packs/day: 0.00     Types: Cigarettes     Quit date: 2000     Years since quittin.3    Smokeless tobacco: Never   Substance Use Topics    Alcohol use: No    Drug use: No        David Sim Jr., MD  25 [id number]

## 2025-04-14 LAB
OHS QRS DURATION: 92 MS
OHS QTC CALCULATION: 454 MS

## 2025-04-15 ENCOUNTER — HOSPITAL ENCOUNTER (INPATIENT)
Facility: HOSPITAL | Age: 75
LOS: 4 days | Discharge: HOME | DRG: 392 | End: 2025-04-20
Attending: EMERGENCY MEDICINE | Admitting: FAMILY MEDICINE
Payer: MEDICARE

## 2025-04-15 DIAGNOSIS — R11.2 NAUSEA AND VOMITING, UNSPECIFIED VOMITING TYPE: ICD-10-CM

## 2025-04-15 DIAGNOSIS — K52.9 ENTERITIS: ICD-10-CM

## 2025-04-15 DIAGNOSIS — R10.84 GENERALIZED ABDOMINAL PAIN: Primary | ICD-10-CM

## 2025-04-15 LAB
ABSOLUTE EOSINOPHIL (OHS): 0.05 K/UL
ABSOLUTE MONOCYTE (OHS): 0.36 K/UL (ref 0.3–1)
ABSOLUTE NEUTROPHIL COUNT (OHS): 5.35 K/UL (ref 1.8–7.7)
ALBUMIN SERPL BCP-MCNC: 3.5 G/DL (ref 3.5–5.2)
ALP SERPL-CCNC: 53 UNIT/L (ref 40–150)
ALT SERPL W/O P-5'-P-CCNC: 12 UNIT/L (ref 10–44)
ANION GAP (OHS): 9 MMOL/L (ref 8–16)
AST SERPL-CCNC: 18 UNIT/L (ref 11–45)
BACTERIA #/AREA URNS AUTO: NORMAL /HPF
BASOPHILS # BLD AUTO: 0.02 K/UL
BASOPHILS NFR BLD AUTO: 0.3 %
BILIRUB SERPL-MCNC: 0.3 MG/DL (ref 0.1–1)
BILIRUB UR QL STRIP.AUTO: NEGATIVE
BUN SERPL-MCNC: 19 MG/DL (ref 8–23)
CALCIUM SERPL-MCNC: 9.1 MG/DL (ref 8.7–10.5)
CHLORIDE SERPL-SCNC: 105 MMOL/L (ref 95–110)
CLARITY UR: CLEAR
CO2 SERPL-SCNC: 22 MMOL/L (ref 23–29)
COLOR UR AUTO: YELLOW
CREAT SERPL-MCNC: 1.6 MG/DL (ref 0.5–1.4)
EAG (OHS): 200 MG/DL (ref 68–131)
ERYTHROCYTE [DISTWIDTH] IN BLOOD BY AUTOMATED COUNT: 13.9 % (ref 11.5–14.5)
GFR SERPLBLD CREATININE-BSD FMLA CKD-EPI: 34 ML/MIN/1.73/M2
GLUCOSE SERPL-MCNC: 307 MG/DL (ref 70–110)
GLUCOSE UR QL STRIP: ABNORMAL
HBA1C MFR BLD: 8.6 % (ref 4–5.6)
HCT VFR BLD AUTO: 41.7 % (ref 37–48.5)
HGB BLD-MCNC: 14 GM/DL (ref 12–16)
HGB UR QL STRIP: NEGATIVE
HOLD SPECIMEN: NORMAL
HYALINE CASTS UR QL AUTO: 0 /LPF (ref 0–1)
IMM GRANULOCYTES # BLD AUTO: 0.02 K/UL (ref 0–0.04)
IMM GRANULOCYTES NFR BLD AUTO: 0.3 % (ref 0–0.5)
KETONES UR QL STRIP: NEGATIVE
LACTATE SERPL-SCNC: 1.9 MMOL/L (ref 0.5–2.2)
LEUKOCYTE ESTERASE UR QL STRIP: NEGATIVE
LIPASE SERPL-CCNC: 46 U/L (ref 4–60)
LYMPHOCYTES # BLD AUTO: 1.4 K/UL (ref 1–4.8)
MAGNESIUM SERPL-MCNC: 1.6 MG/DL (ref 1.6–2.6)
MCH RBC QN AUTO: 27.7 PG (ref 27–31)
MCHC RBC AUTO-ENTMCNC: 33.6 G/DL (ref 32–36)
MCV RBC AUTO: 82 FL (ref 82–98)
MICROSCOPIC COMMENT: NORMAL
NITRITE UR QL STRIP: NEGATIVE
NUCLEATED RBC (/100WBC) (OHS): 0 /100 WBC
PH UR STRIP: 6 [PH]
PLATELET # BLD AUTO: 243 K/UL (ref 150–450)
PMV BLD AUTO: 9.3 FL (ref 9.2–12.9)
POCT GLUCOSE: 142 MG/DL (ref 70–110)
POCT GLUCOSE: 216 MG/DL (ref 70–110)
POTASSIUM SERPL-SCNC: 3.8 MMOL/L (ref 3.5–5.1)
PROT SERPL-MCNC: 7.6 GM/DL (ref 6–8.4)
PROT UR QL STRIP: ABNORMAL
RBC # BLD AUTO: 5.06 M/UL (ref 4–5.4)
RBC #/AREA URNS AUTO: 0 /HPF (ref 0–4)
RELATIVE EOSINOPHIL (OHS): 0.7 %
RELATIVE LYMPHOCYTE (OHS): 19.4 % (ref 18–48)
RELATIVE MONOCYTE (OHS): 5 % (ref 4–15)
RELATIVE NEUTROPHIL (OHS): 74.3 % (ref 38–73)
SODIUM SERPL-SCNC: 136 MMOL/L (ref 136–145)
SP GR UR STRIP: 1.03
UROBILINOGEN UR STRIP-ACNC: NEGATIVE EU/DL
WBC # BLD AUTO: 7.2 K/UL (ref 3.9–12.7)
WBC #/AREA URNS AUTO: 0 /HPF (ref 0–5)
YEAST UR QL AUTO: NORMAL /HPF

## 2025-04-15 PROCEDURE — 96375 TX/PRO/DX INJ NEW DRUG ADDON: CPT

## 2025-04-15 PROCEDURE — 96376 TX/PRO/DX INJ SAME DRUG ADON: CPT

## 2025-04-15 PROCEDURE — 96372 THER/PROPH/DIAG INJ SC/IM: CPT | Performed by: NURSE PRACTITIONER

## 2025-04-15 PROCEDURE — G0378 HOSPITAL OBSERVATION PER HR: HCPCS

## 2025-04-15 PROCEDURE — 63600175 PHARM REV CODE 636 W HCPCS: Performed by: NURSE PRACTITIONER

## 2025-04-15 PROCEDURE — 63600175 PHARM REV CODE 636 W HCPCS: Performed by: EMERGENCY MEDICINE

## 2025-04-15 PROCEDURE — 83735 ASSAY OF MAGNESIUM: CPT | Performed by: EMERGENCY MEDICINE

## 2025-04-15 PROCEDURE — 83690 ASSAY OF LIPASE: CPT | Performed by: EMERGENCY MEDICINE

## 2025-04-15 PROCEDURE — 80053 COMPREHEN METABOLIC PANEL: CPT | Performed by: EMERGENCY MEDICINE

## 2025-04-15 PROCEDURE — 85025 COMPLETE CBC W/AUTO DIFF WBC: CPT | Performed by: EMERGENCY MEDICINE

## 2025-04-15 PROCEDURE — 25000003 PHARM REV CODE 250: Performed by: NURSE PRACTITIONER

## 2025-04-15 PROCEDURE — 96374 THER/PROPH/DIAG INJ IV PUSH: CPT

## 2025-04-15 PROCEDURE — 81001 URINALYSIS AUTO W/SCOPE: CPT | Performed by: EMERGENCY MEDICINE

## 2025-04-15 PROCEDURE — 83605 ASSAY OF LACTIC ACID: CPT | Performed by: EMERGENCY MEDICINE

## 2025-04-15 PROCEDURE — 99285 EMERGENCY DEPT VISIT HI MDM: CPT | Mod: 25

## 2025-04-15 PROCEDURE — 83036 HEMOGLOBIN GLYCOSYLATED A1C: CPT | Performed by: NURSE PRACTITIONER

## 2025-04-15 RX ORDER — MORPHINE SULFATE 4 MG/ML
4 INJECTION, SOLUTION INTRAMUSCULAR; INTRAVENOUS
Refills: 0 | Status: COMPLETED | OUTPATIENT
Start: 2025-04-15 | End: 2025-04-15

## 2025-04-15 RX ORDER — INSULIN GLARGINE 100 [IU]/ML
10 INJECTION, SOLUTION SUBCUTANEOUS 2 TIMES DAILY
Status: DISCONTINUED | OUTPATIENT
Start: 2025-04-15 | End: 2025-04-20 | Stop reason: HOSPADM

## 2025-04-15 RX ORDER — PANTOPRAZOLE SODIUM 40 MG/1
40 TABLET, DELAYED RELEASE ORAL DAILY
Status: DISCONTINUED | OUTPATIENT
Start: 2025-04-16 | End: 2025-04-16

## 2025-04-15 RX ORDER — MORPHINE SULFATE 2 MG/ML
2 INJECTION, SOLUTION INTRAMUSCULAR; INTRAVENOUS EVERY 6 HOURS PRN
Status: DISPENSED | OUTPATIENT
Start: 2025-04-15 | End: 2025-04-16

## 2025-04-15 RX ORDER — CARVEDILOL 12.5 MG/1
12.5 TABLET ORAL 2 TIMES DAILY
Status: DISCONTINUED | OUTPATIENT
Start: 2025-04-15 | End: 2025-04-16

## 2025-04-15 RX ORDER — SODIUM BICARBONATE 650 MG/1
650 TABLET ORAL 3 TIMES DAILY
Status: DISCONTINUED | OUTPATIENT
Start: 2025-04-15 | End: 2025-04-16

## 2025-04-15 RX ORDER — TALC
6 POWDER (GRAM) TOPICAL NIGHTLY PRN
Status: DISCONTINUED | OUTPATIENT
Start: 2025-04-15 | End: 2025-04-16

## 2025-04-15 RX ORDER — ACETAMINOPHEN 500 MG
500 TABLET ORAL EVERY 6 HOURS PRN
Status: DISCONTINUED | OUTPATIENT
Start: 2025-04-15 | End: 2025-04-16

## 2025-04-15 RX ORDER — GLUCAGON 1 MG
1 KIT INJECTION
Status: DISCONTINUED | OUTPATIENT
Start: 2025-04-15 | End: 2025-04-16

## 2025-04-15 RX ORDER — ONDANSETRON HYDROCHLORIDE 2 MG/ML
4 INJECTION, SOLUTION INTRAVENOUS
Status: COMPLETED | OUTPATIENT
Start: 2025-04-15 | End: 2025-04-15

## 2025-04-15 RX ORDER — SODIUM CHLORIDE 0.9 % (FLUSH) 0.9 %
10 SYRINGE (ML) INJECTION
Status: DISCONTINUED | OUTPATIENT
Start: 2025-04-15 | End: 2025-04-20 | Stop reason: HOSPADM

## 2025-04-15 RX ORDER — ATORVASTATIN CALCIUM 40 MG/1
40 TABLET, FILM COATED ORAL DAILY
Status: DISCONTINUED | OUTPATIENT
Start: 2025-04-16 | End: 2025-04-16

## 2025-04-15 RX ORDER — ONDANSETRON 4 MG/1
4 TABLET, ORALLY DISINTEGRATING ORAL EVERY 6 HOURS PRN
Status: DISCONTINUED | OUTPATIENT
Start: 2025-04-15 | End: 2025-04-20 | Stop reason: HOSPADM

## 2025-04-15 RX ORDER — VALSARTAN 160 MG/1
320 TABLET ORAL DAILY
Status: DISCONTINUED | OUTPATIENT
Start: 2025-04-16 | End: 2025-04-16

## 2025-04-15 RX ORDER — INSULIN ASPART 100 [IU]/ML
0-10 INJECTION, SOLUTION INTRAVENOUS; SUBCUTANEOUS EVERY 6 HOURS PRN
Status: DISCONTINUED | OUTPATIENT
Start: 2025-04-15 | End: 2025-04-16

## 2025-04-15 RX ADMIN — ONDANSETRON 4 MG: 4 TABLET, ORALLY DISINTEGRATING ORAL at 07:04

## 2025-04-15 RX ADMIN — MORPHINE SULFATE 4 MG: 4 INJECTION INTRAVENOUS at 10:04

## 2025-04-15 RX ADMIN — Medication 6 MG: at 08:04

## 2025-04-15 RX ADMIN — CARVEDILOL 12.5 MG: 12.5 TABLET, FILM COATED ORAL at 08:04

## 2025-04-15 RX ADMIN — ONDANSETRON 4 MG: 2 INJECTION INTRAMUSCULAR; INTRAVENOUS at 10:04

## 2025-04-15 RX ADMIN — MORPHINE SULFATE 2 MG: 2 INJECTION, SOLUTION INTRAMUSCULAR; INTRAVENOUS at 07:04

## 2025-04-15 RX ADMIN — PIPERACILLIN AND TAZOBACTAM 4.5 G: 4; .5 INJECTION, POWDER, LYOPHILIZED, FOR SOLUTION INTRAVENOUS; PARENTERAL at 05:04

## 2025-04-15 RX ADMIN — ONDANSETRON 4 MG: 2 INJECTION INTRAMUSCULAR; INTRAVENOUS at 01:04

## 2025-04-15 RX ADMIN — SODIUM BICARBONATE 650 MG: 650 TABLET ORAL at 08:04

## 2025-04-15 RX ADMIN — INSULIN GLARGINE 10 UNITS: 100 INJECTION, SOLUTION SUBCUTANEOUS at 08:04

## 2025-04-15 RX ADMIN — ACETAMINOPHEN 500 MG: 500 TABLET ORAL at 08:04

## 2025-04-15 RX ADMIN — MORPHINE SULFATE 4 MG: 4 INJECTION INTRAVENOUS at 01:04

## 2025-04-15 NOTE — ASSESSMENT & PLAN NOTE
Patient's blood pressure range in the last 24 hours was: BP  Min: 146/87  Max: 171/97.The patient's inpatient anti-hypertensive regimen is listed below:  Current Antihypertensives  carvediloL tablet 12.5 mg, 2 times daily, Oral  valsartan tablet 320 mg, Daily, Oral    Plan  - BP is controlled, no changes needed to their regimen  - Monitor

## 2025-04-15 NOTE — ASSESSMENT & PLAN NOTE
Patient has persistent (7 days or more) atrial fibrillation. Patient is currently in atrial fibrillation. [id number] Score: 3. The patients heart rate in the last 24 hours is as follows:  Pulse  Min: 77  Max: 91     Antiarrhythmics       Anticoagulants       Plan  - Replete lytes with a goal of K>4, Mg >2  - Patient is anticoagulated, see medications listed above.  - Patient's afib is currently controlled  - Will hold Eliquis until general surgery evaluation.

## 2025-04-15 NOTE — H&P
[facility] - Emergency Dept  [facility] Medicine  History & Physical    Patient Name: Azucena Morrison  MRN: [id number]  Patient Class: OP- Observation  Admission Date: 4/15/2025  Attending Physician: Chastity Lovett*   Primary Care Provider: Pj Sanches MD         Patient information was obtained from patient and ER records.     Subjective:     Principal Problem:Generalized abdominal pain    Chief Complaint:   Chief Complaint   Patient presents with    Abdominal Pain     Pt arrives to ED for evaluation of evaluation of abdominal pain to upper abdomen and vomiting for last 5 days. Previously seen in ED Sunday and was dx with hernia. Pt reports taking medication ordered without any symptom relief. Also attempted APAP with no relief. Pt abdomen soft and tender with palpation. Pt denies any blood noted in emesis or stool. Pt appears uncomfortable during triage.    Vomiting        HPI: Ms Morrison is a 74 year old female who present to Emergnecy Room for evaluation of abdominal. She has PMHx of A Fib on Eliquis, colon surgery, DM, chronic kidney disease stage 3, HLD. Associated symptoms include vomiting and started about five days ago. She denies fever, chills, shortness of breath. Patient was seen in the Emergency Room two days ago with similar complaints, CT of done and she was discharge home. CT of the abdomen today showed moderate length of distal small bowel wall thickening with adjoining inflammatory changes, appearing new/progressed when compared to recent CT of 04/13/2025, mild small-bowel in this region without evidence of toby bowel obstruction. Differential include infectious or noninfectious inflammatory enteritis, developing mechanical small bowel obstruction would be difficult to exclude. She is a full code and place in Observation under the care of Hospital Medicine     Past Medical History:   Diagnosis Date    Chronic kidney disease, stage 3     Diabetes mellitus     Diabetes mellitus, type 2      Hematuria, unspecified     Hypercalcemia     Hypertension     Malnutrition of moderate degree 2020    Mixed hyperlipidemia 2022    Proteinuria     Renal cyst, left     UTI (urinary tract infection) 2020       Past Surgical History:   Procedure Laterality Date    ABDOMINAL SURGERY      CATHETERIZATION OF URETER Bilateral 2020    Procedure: CATHETERIZATION, URETER;  Surgeon: Kvng Cunningham MD;  Location: [facility] OR [address] [place] [address];  Service: Urology;  Laterality: Bilateral;     SECTION, CLASSIC      x2    CHOLECYSTECTOMY      CLOSURE, ILEOSTOMY, OPEN N/A 2021    Procedure: CLOSURE, ILEOSTOMY, OPEN, ERAS low;  Surgeon: Fabiana Valiente MD;  Location: [facility] OR [address] [place] [address];  Service: Colon and Rectal;  Laterality: N/A;    COLON SURGERY      [facility]    COLONOSCOPY N/A 2018    Procedure: COLONOSCOPY;  Surgeon: Gibran Aguayo MD;  Location: [facility];  Service: Endoscopy;  Laterality: N/A;    COLOSTOMY Left     CYSTOSCOPY N/A 2020    Procedure: CYSTOSCOPY;  Surgeon: Kvng Cunningham MD;  Location: [address];  Service: Urology;  Laterality: N/A;    CYSTOSCOPY WITH URETEROSCOPY, RETROGRADE PYELOGRAPHY, AND INSERTION OF STENT Left 2019    Procedure: CYSTOSCOPY, WITH RETROGRADE PYELOGRAM AND URETERAL STENT INSERTION with placement of a muir cath;  Surgeon: Ulisses Horton MD;  Location: [facility];  Service: General;  Laterality: Left;    FLEXIBLE SIGMOIDOSCOPY N/A 2021    Procedure: SIGMOIDOSCOPY, FLEXIBLE;  Surgeon: Fabiana Valiente MD;  Location: [address];  Service: Colon and Rectal;  Laterality: N/A;    HYSTEROSCOPY WITH DILATION AND CURETTAGE OF UTERUS N/A 2020    Procedure: HYSTEROSCOPY, WITH DILATION AND CURETTAGE OF UTERUS (MYOSURE);  Surgeon: Caitlyn Feng MD;  Location: [facility] OR;  Service: OB/GYN;  Laterality: N/A;  Guido notified , EF  video    HYSTEROSCOPY WITH DILATION AND CURETTAGE OF UTERUS N/A 3/4/2022    Procedure:  "HYSTEROSCOPY, WITH DILATION AND CURETTAGE OF UTERUS;  Surgeon: Caitlyn Feng MD;  Location: [facility] OR;  Service: OB/GYN;  Laterality: N/A;  Alexander notified CW 3/3  video confirmed    ILEOSTOMY  7/31/2020    Procedure: CREATION, ILEOSTOMY;  Surgeon: Fabiana Valiente MD;  Location: [facility] OR 2ND FLR;  Service: Colon and Rectal;;    INCISION AND DRAINAGE OF ABSCESS N/A 12/22/2019    Procedure: INCISION AND DRAINAGE, ABSCESS;  Surgeon: Ulisses Horton MD;  Location: [facility] OR;  Service: General;  Laterality: N/A;    INCISION OF ABDOMINAL WALL N/A 8/10/2018    Procedure: INCISION, ABDOMINAL WALL;  Surgeon: Ulisses Horton MD;  Location: [facility] OR;  Service: General;  Laterality: N/A;    INCISIONAL HERNIA REPAIR Right 2010    LOW ANTERIOR RESECTION OF COLON N/A 7/31/2020    Procedure: RESECTION, COLON, LOW ANTERIOR;  Surgeon: Fabiana Valiente MD;  Location: [facility] OR 2ND FLR;  Service: Colon and Rectal;  Laterality: N/A;    LYSIS OF ADHESIONS  7/31/2020    Procedure: LYSIS, ADHESIONS;  Surgeon: Fabiana Valiente MD;  Location: [facility] OR 2ND FLR;  Service: Colon and Rectal;;    SURGICAL REMOVAL OF MASS OF LOWER EXTREMITY Bilateral 10/13/2023    Procedure: EXCISION, MASS ruthy ankle Malleolus;  Surgeon: Ulisses Horton MD;  Location: [facility] OR;  Service: General;  Laterality: Bilateral;       Review of patient's allergies indicates:   Allergen Reactions    Chlorhexidine gluconate Rash     Chlorhexidine/alcohol wipes. Rash and blistering.       No current facility-administered medications on file prior to encounter.     Current Outpatient Medications on File Prior to Encounter   Medication Sig    acetaminophen (TYLENOL) 500 MG tablet Take 1 tablet (500 mg total) by mouth every 6 (six) hours as needed for Pain.    apixaban (ELIQUIS) 5 mg Tab Take 1 tablet (5 mg total) by mouth 2 (two) times daily.    BD ONEIL 2ND GEN PEN NEEDLE 32 gauge x 5/32" Ndle Inject 1 Needle into the skin 3 (three) times daily.    carvedilol " PHOSPHATE 40 MG ORAL CM24 (COREG CR) 40 MG CM24 Take 1 capsule (40 mg total) by mouth once daily.    cephALEXin (KEFLEX) 500 MG capsule Take 1 capsule (500 mg total) by mouth every 12 (twelve) hours. for 7 days    empagliflozin (JARDIANCE) 25 mg tablet Take 1 tablet (25 mg total) by mouth once daily.    fenofibrate micronized (LOFIBRA) 200 MG Cap Take 200 mg by mouth daily with breakfast.    finerenone (KERENDIA) 10 mg Tab Take 1 tablet by mouth once daily.    icosapent ethyl (VASCEPA ORAL) Take 1 mg by mouth.    insulin glargine U-100, Lantus, 100 unit/mL (3 mL) SubQ InPn pen Inject 25 Units into the skin 2 (two) times daily.    olmesartan (BENICAR) 40 MG tablet Take 40 mg by mouth once daily.    ondansetron (ZOFRAN-ODT) 4 MG TbDL Take 1 tablet (4 mg total) by mouth every 6 (six) hours as needed.    pantoprazole (PROTONIX) 40 MG tablet Take 1 tablet (40 mg total) by mouth once daily.    rosuvastatin (CRESTOR) 40 MG Tab Take 40 mg by mouth every evening.    sodium bicarbonate 650 MG tablet Take 1 tablet (650 mg total) by mouth 3 (three) times daily.    tirzepatide 7.5 mg/0.5 mL PnIj Inject 7.5 mg into the skin every Sunday.    blood sugar diagnostic Strp 100 each by Misc.(Non-Drug; Combo Route) route once daily.    diclofenac sodium (VOLTAREN) 1 % Gel Apply 2 g topically 4 (four) times daily.    docusate sodium (COLACE) 100 MG capsule Take 1 capsule (100 mg total) by mouth 2 (two) times daily.    polyethylene glycol (GLYCOLAX) 17 gram PwPk Take 17 g by mouth once daily.    solifenacin (VESICARE) 10 MG tablet Take 5 mg by mouth once daily.    triamcinolone acetonide 0.1% (KENALOG) 0.1 % ointment Apply topically 2 (two) times daily.    TRUE METRIX GLUCOSE TEST STRIP Strp Inject 1 strip into the skin 3 (three) times daily.    TRUEPLUS LANCETS 30 gauge Misc Inject 1 lancet  into the skin 3 (three) times daily.    [DISCONTINUED] ketoconazole (NIZORAL) 2 % cream Apply topically once daily. (Patient not taking: Reported on  2025)    [DISCONTINUED] pravastatin (PRAVACHOL) 40 MG tablet Take 40 mg by mouth every evening.     Family History       Problem Relation (Age of Onset)    Alcohol abuse Father    Diabetes Sister, Brother    Early death Grandchild    Heart disease Mother, Sister    Hyperlipidemia Mother, Sister    Hypertension Mother, Sister    Stroke Mother, Sister          Tobacco Use    Smoking status: Former     Current packs/day: 0.00     Types: Cigarettes     Quit date: 2000     Years since quittin.3    Smokeless tobacco: Never   Substance and Sexual Activity    Alcohol use: No    Drug use: No    Sexual activity: Not Currently     Review of Systems   Constitutional:  Negative for chills and fever.   Gastrointestinal:  Positive for abdominal pain, nausea and vomiting.     Objective:     Vital Signs (Most Recent):  Temp: 97.8 °F (36.6 °C) (04/15/25 0959)  Pulse: 77 (04/15/25 1459)  Resp: 18 (04/15/25 1343)  BP: (!) 146/87 (04/15/25 1459)  SpO2: 96 % (04/15/25 1459) Vital Signs (24h Range):  Temp:  [97.8 °F (36.6 °C)] 97.8 °F (36.6 °C)  Pulse:  [77-91] 77  Resp:  [18-24] 18  SpO2:  [96 %-98 %] 96 %  BP: (146-171)/(76-97) 146/87     Weight: 76.2 kg (168 lb)  Body mass index is 30.73 kg/m².     Physical Exam  Vitals and nursing note reviewed.   Constitutional:       Appearance: Normal appearance. She is ill-appearing.   Cardiovascular:      Heart sounds: No murmur heard.     No friction rub. No gallop.   Pulmonary:      Effort: No respiratory distress.      Breath sounds: No stridor. No wheezing, rhonchi or rales.   Chest:      Chest wall: No tenderness.   Abdominal:      General: Bowel sounds are decreased. There is no distension.      Tenderness: There is generalized abdominal tenderness. There is no right CVA tenderness, left CVA tenderness, guarding or rebound.      Hernia: No hernia is present.      Comments: Old mid abdominal incision healed, left quadrant area incision healed    Skin:     Coloration: Skin is  not jaundiced or pale.      Findings: No bruising, erythema, lesion or rash.   Neurological:      Mental Status: She is alert.                Significant Labs: All pertinent labs within the past 24 hours have been reviewed.  CBC:   Recent Labs   Lab 04/13/25  1645 04/15/25  1029   WBC 7.44 7.20   HGB 14.5 14.0   HCT 42.2 41.7    243     CMP:   Recent Labs   Lab 04/13/25  1645 04/15/25  1029    136   K 3.7 3.8    105   CO2 20* 22*   BUN 25* 19   CREATININE 1.7* 1.6*   CALCIUM 9.8 9.1   ALBUMIN 3.8 3.5   BILITOT 0.4 0.3   ALKPHOS 56 53   AST 18 18   ALT 13 12   ANIONGAP 14 9     Lactic Acid:   Recent Labs   Lab 04/13/25  1645 04/15/25  1029   LACTATE 1.6 1.9     Lipase:   Recent Labs   Lab 04/13/25  1645 04/15/25  1029   LIPASE 44 46     Magnesium:   Recent Labs   Lab 04/15/25  1029   MG 1.6       Significant Imaging:   Imaging Results              CT Abdomen Pelvis  Without Contrast (Final result)  Result time 04/15/25 14:04:08      Final result by Kvng Pugh MD (04/15/25 14:04:08)                   Impression:      1. Moderate length of distal small bowel wall thickening with adjoining inflammatory changes, appearing new/progressed when compared to recent CT of 04/13/2025.  Mildly prominent caliber of small-bowel in this region without evidence of toby bowel obstruction.  Differential considerations would include infectious or noninfectious inflammatory enteritis, noting that developing mechanical small bowel obstruction would be difficult to exclude, given proximity of these loops to anastomotic suture material.  2. Additional details of chronic and incidental findings, as described in the body of the report.      Electronically signed by: Kvng Pugh  Date:    04/15/2025  Time:    14:04               Narrative:    EXAMINATION:  CT ABDOMEN PELVIS WITHOUT CONTRAST    CLINICAL HISTORY:  Bowel obstruction suspected;    TECHNIQUE:  Low dose axial images, sagittal and coronal reformations  were obtained from the lung bases to the pubic symphysis.    COMPARISON:  CT of the abdomen and pelvis performed 04/13/2025.    FINDINGS:  This examination is limited due to lack of intravenous contrast.    Lower chest: Cardiac valvular and coronary artery calcifications again partially imaged.  Atelectasis suggested at the visualized lung bases.    Liver: Normal contour.    Gallbladder and bile ducts: Status post cholecystectomy.  Similar mildly prominent caliber of the bile ducts favored to reflect post cholecystectomy status.    Pancreas: Normal contour.    Spleen: Normal contour.    Adrenals: Normal contour.    Kidneys: Cortical atrophy of both kidneys.  Nonspecific mild bilateral perinephric stranding.  Fluid attenuation lesions, presumed cysts involving the kidneys.  Additional subcentimeter hypodense lesions are too small to definitively characterize.    Lymph nodes: No abdominal or pelvic lymphadenopathy.    Bowel and mesentery: Postoperative changes of bowel are again noted.  Moderate length of distal small bowel wall thickening with adjoining inflammatory changes are noted with relative proximity to surgical anastomosis within the right lower quadrant (for example as seen on axial series 2, image 92 and coronal reformat series 602, image 54).  Fecalization of distal small bowel loops again noted.  Mildly prominent caliber small bowel in this region without evidence of toby obstruction at the present time.  Colonic diverticulosis is seen.  Appendix not confidently identified.    Abdominal aorta: Nonaneurysmal.  Moderate to heavy atherosclerosis.    Inferior vena cava: Unremarkable.    Free fluid or free air: None.    Pelvis: Unremarkable.    Urinary bladder: Unremarkable.    Body wall: Remote operative sequela again noted in the anterior abdominal wall with rectus diastasis.  Protrusion of subjacent bowel loops, similar configuration to recent CT of 04/13/2025.    Bones: Unremarkable.                                        Assessment/Plan:     Assessment & Plan  Generalized abdominal pain  Possible bowl obstruction vs infection/noninfectious process    Will start antibiotic for now   She has history of abdominal surgeries   Consults General Surgery for further evaluation and treatment   Bowl rest   IVF's     History of hemicolectomy    Possible bowl obstruction vs infection/noninfectious process  Consults General Surgery for further evaluation and treatment  Type 2 diabetes mellitus without complication, with long-term current use of insulin  Accu checks every 6 hour with SSI   Continue Lantus at half dose       Essential hypertension  Patient's blood pressure range in the last 24 hours was: BP  Min: 146/87  Max: 171/97.The patient's inpatient anti-hypertensive regimen is listed below:  Current Antihypertensives  carvediloL tablet 12.5 mg, 2 times daily, Oral  valsartan tablet 320 mg, Daily, Oral    Plan  - BP is controlled, no changes needed to their regimen  - Monitor   Stage 3a chronic kidney disease  Creatine stable for now. BMP reviewed- noted Estimated Creatinine Clearance: 29.9 mL/min (A) (based on SCr of 1.6 mg/dL (H)). according to latest data. Based on current GFR, CKD stage is stage 3 - GFR 30-59.  Monitor UOP and serial BMP and adjust therapy as needed. Renally dose meds. Avoid nephrotoxic medications and procedures.  Mixed hyperlipidemia  Continue home Statin   Restart home Lofibra and and Vascepa     A-fib  Patient has persistent (7 days or more) atrial fibrillation. Patient is currently in atrial fibrillation. [id number] Score: 3. The patients heart rate in the last 24 hours is as follows:  Pulse  Min: 77  Max: 91     Antiarrhythmics       Anticoagulants       Plan  - Replete lytes with a goal of K>4, Mg >2  - Patient is anticoagulated, see medications listed above.  - Patient's afib is currently controlled  - Will hold Eliquis until general surgery evaluation.     VTE Risk Mitigation (From  admission, onward)           Ordered     IP VTE HIGH RISK PATIENT  Once         04/15/25 1505     Place sequential compression device  Until discontinued         04/15/25 1505                         On 04/15/2025, patient should be placed in hospital observation services under my care in collaboration with [facility], MD Lily.           Guido Paiz NP  Department of Hospital Medicine  [facility] - Emergency Dept

## 2025-04-15 NOTE — ED NOTES
Received phone call from Azucena Nickerson (JOHNY) Pt called her to let her know that the doctor did not use translation services when reviewing plan of care. Advised JOHNY that we would go speak with patient utilizing the  and update her on the plan of care. JOHNY requested that we contact her if we have any additional questions or need any clarification on anything regarding treatment of patient.

## 2025-04-15 NOTE — ASSESSMENT & PLAN NOTE
Possible bowl obstruction vs infection/noninfectious process  Consults General Surgery for further evaluation and treatment

## 2025-04-15 NOTE — SUBJECTIVE & OBJECTIVE
Past Medical History:   Diagnosis Date    Chronic kidney disease, stage 3     Diabetes mellitus     Diabetes mellitus, type 2     Hematuria, unspecified     Hypercalcemia     Hypertension     Malnutrition of moderate degree 2020    Mixed hyperlipidemia 2022    Proteinuria     Renal cyst, left     UTI (urinary tract infection) 2020       Past Surgical History:   Procedure Laterality Date    ABDOMINAL SURGERY      CATHETERIZATION OF URETER Bilateral 2020    Procedure: CATHETERIZATION, URETER;  Surgeon: Kvng Cunningham MD;  Location: [facility] OR [address] [place] [address];  Service: Urology;  Laterality: Bilateral;     SECTION, CLASSIC      x2    CHOLECYSTECTOMY      CLOSURE, ILEOSTOMY, OPEN N/A 2021    Procedure: CLOSURE, ILEOSTOMY, OPEN, ERAS low;  Surgeon: Fabiana Valiente MD;  Location: [facility] OR [address] [place] [address];  Service: Colon and Rectal;  Laterality: N/A;    COLON SURGERY      [facility]    COLONOSCOPY N/A 2018    Procedure: COLONOSCOPY;  Surgeon: Gibran Aguayo MD;  Location: [facility];  Service: Endoscopy;  Laterality: N/A;    COLOSTOMY Left     CYSTOSCOPY N/A 2020    Procedure: CYSTOSCOPY;  Surgeon: Kvng Cunningham MD;  Location: [address];  Service: Urology;  Laterality: N/A;    CYSTOSCOPY WITH URETEROSCOPY, RETROGRADE PYELOGRAPHY, AND INSERTION OF STENT Left 2019    Procedure: CYSTOSCOPY, WITH RETROGRADE PYELOGRAM AND URETERAL STENT INSERTION with placement of a muir cath;  Surgeon: Ulisses Horton MD;  Location: [facility];  Service: General;  Laterality: Left;    FLEXIBLE SIGMOIDOSCOPY N/A 2021    Procedure: SIGMOIDOSCOPY, FLEXIBLE;  Surgeon: Fabiana Valiente MD;  Location: [address];  Service: Colon and Rectal;  Laterality: N/A;    HYSTEROSCOPY WITH DILATION AND CURETTAGE OF UTERUS N/A 2020    Procedure: HYSTEROSCOPY, WITH DILATION AND CURETTAGE OF UTERUS (MYOSURE);  Surgeon: Caitlyn Feng MD;  Location: [facility];  Service: OB/GYN;   Laterality: N/A;  Guido notified 11/30, EF  video    HYSTEROSCOPY WITH DILATION AND CURETTAGE OF UTERUS N/A 3/4/2022    Procedure: HYSTEROSCOPY, WITH DILATION AND CURETTAGE OF UTERUS;  Surgeon: Caitlyn Feng MD;  Location: [facility] OR;  Service: OB/GYN;  Laterality: N/A;  Alexander notified CW 3/3  video confirmed    ILEOSTOMY  7/31/2020    Procedure: CREATION, ILEOSTOMY;  Surgeon: Fabiana Valiente MD;  [facility];  Service: Colon and Rectal;;    INCISION AND DRAINAGE OF ABSCESS N/A 12/22/2019    Procedure: INCISION AND DRAINAGE, ABSCESS;  Surgeon: Ulisses Horton MD;  Location: [facility] OR;  Service: General;  Laterality: N/A;    INCISION OF ABDOMINAL WALL N/A 8/10/2018    Procedure: INCISION, ABDOMINAL WALL;  Surgeon: Ulisses Horton MD;  Location: [facility] OR;  Service: General;  Laterality: N/A;    INCISIONAL HERNIA REPAIR Right 2010    LOW ANTERIOR RESECTION OF COLON N/A 7/31/2020    Procedure: RESECTION, COLON, LOW ANTERIOR;  Surgeon: Fabiana Valiente MD;  Location: [facility] OR [facility];  Service: Colon and Rectal;  Laterality: N/A;    LYSIS OF ADHESIONS  7/31/2020    Procedure: LYSIS, ADHESIONS;  Surgeon: Fabiana Valiente MD;  [facility];  Service: Colon and Rectal;;    SURGICAL REMOVAL OF MASS OF LOWER EXTREMITY Bilateral 10/13/2023    Procedure: EXCISION, MASS ruthy ankle Malleolus;  Surgeon: Ulisses Horton MD;  Location: [facility];  Service: General;  Laterality: Bilateral;       Review of patient's allergies indicates:   Allergen Reactions    Chlorhexidine gluconate Rash     Chlorhexidine/alcohol wipes. Rash and blistering.       No current facility-administered medications on file prior to encounter.     Current Outpatient Medications on File Prior to Encounter   Medication Sig    acetaminophen (TYLENOL) 500 MG tablet Take 1 tablet (500 mg total) by mouth every 6 (six) hours as needed for Pain.    apixaban (ELIQUIS) 5 mg Tab Take 1 tablet (5 mg total) by mouth 2 (two) times  "daily.    BD ONEIL 2ND GEN PEN NEEDLE 32 gauge x 5/32" Ndle Inject 1 Needle into the skin 3 (three) times daily.    carvedilol PHOSPHATE 40 MG ORAL CM24 (COREG CR) 40 MG CM24 Take 1 capsule (40 mg total) by mouth once daily.    cephALEXin (KEFLEX) 500 MG capsule Take 1 capsule (500 mg total) by mouth every 12 (twelve) hours. for 7 days    empagliflozin (JARDIANCE) 25 mg tablet Take 1 tablet (25 mg total) by mouth once daily.    fenofibrate micronized (LOFIBRA) 200 MG Cap Take 200 mg by mouth daily with breakfast.    finerenone (KERENDIA) 10 mg Tab Take 1 tablet by mouth once daily.    icosapent ethyl (VASCEPA ORAL) Take 1 mg by mouth.    insulin glargine U-100, Lantus, 100 unit/mL (3 mL) SubQ InPn pen Inject 25 Units into the skin 2 (two) times daily.    olmesartan (BENICAR) 40 MG tablet Take 40 mg by mouth once daily.    ondansetron (ZOFRAN-ODT) 4 MG TbDL Take 1 tablet (4 mg total) by mouth every 6 (six) hours as needed.    pantoprazole (PROTONIX) 40 MG tablet Take 1 tablet (40 mg total) by mouth once daily.    rosuvastatin (CRESTOR) 40 MG Tab Take 40 mg by mouth every evening.    sodium bicarbonate 650 MG tablet Take 1 tablet (650 mg total) by mouth 3 (three) times daily.    tirzepatide 7.5 mg/0.5 mL PnIj Inject 7.5 mg into the skin every Sunday.    blood sugar diagnostic Strp 100 each by Misc.(Non-Drug; Combo Route) route once daily.    diclofenac sodium (VOLTAREN) 1 % Gel Apply 2 g topically 4 (four) times daily.    docusate sodium (COLACE) 100 MG capsule Take 1 capsule (100 mg total) by mouth 2 (two) times daily.    polyethylene glycol (GLYCOLAX) 17 gram PwPk Take 17 g by mouth once daily.    solifenacin (VESICARE) 10 MG tablet Take 5 mg by mouth once daily.    triamcinolone acetonide 0.1% (KENALOG) 0.1 % ointment Apply topically 2 (two) times daily.    TRUE METRIX GLUCOSE TEST STRIP Strp Inject 1 strip into the skin 3 (three) times daily.    TRUEPLUS LANCETS 30 gauge Misc Inject 1 lancet  into the skin 3 " (three) times daily.    [DISCONTINUED] ketoconazole (NIZORAL) 2 % cream Apply topically once daily. (Patient not taking: Reported on 2025)    [DISCONTINUED] pravastatin (PRAVACHOL) 40 MG tablet Take 40 mg by mouth every evening.     Family History       Problem Relation (Age of Onset)    Alcohol abuse Father    Diabetes Sister, Brother    Early death Grandchild    Heart disease Mother, Sister    Hyperlipidemia Mother, Sister    Hypertension Mother, Sister    Stroke Mother, Sister          Tobacco Use    Smoking status: Former     Current packs/day: 0.00     Types: Cigarettes     Quit date: 2000     Years since quittin.3    Smokeless tobacco: Never   Substance and Sexual Activity    Alcohol use: No    Drug use: No    Sexual activity: Not Currently     Review of Systems   Constitutional:  Negative for chills and fever.   Gastrointestinal:  Positive for abdominal pain, nausea and vomiting.     Objective:     Vital Signs (Most Recent):  Temp: 97.8 °F (36.6 °C) (04/15/25 0959)  Pulse: 77 (04/15/25 1459)  Resp: 18 (04/15/25 1343)  BP: (!) 146/87 (04/15/25 1459)  SpO2: 96 % (04/15/25 1459) Vital Signs (24h Range):  Temp:  [97.8 °F (36.6 °C)] 97.8 °F (36.6 °C)  Pulse:  [77-91] 77  Resp:  [18-24] 18  SpO2:  [96 %-98 %] 96 %  BP: (146-171)/(76-97) 146/87     Weight: 76.2 kg (168 lb)  Body mass index is 30.73 kg/m².     Physical Exam  Vitals and nursing note reviewed.   Constitutional:       Appearance: Normal appearance. She is ill-appearing.   Cardiovascular:      Heart sounds: No murmur heard.     No friction rub. No gallop.   Pulmonary:      Effort: No respiratory distress.      Breath sounds: No stridor. No wheezing, rhonchi or rales.   Chest:      Chest wall: No tenderness.   Abdominal:      General: Bowel sounds are decreased. There is no distension.      Tenderness: There is generalized abdominal tenderness. There is no right CVA tenderness, left CVA tenderness, guarding or rebound.      Hernia: No hernia  is present.      Comments: Old mid abdominal incision healed, left quadrant area incision healed    Skin:     Coloration: Skin is not jaundiced or pale.      Findings: No bruising, erythema, lesion or rash.   Neurological:      Mental Status: She is alert.                Significant Labs: All pertinent labs within the past 24 hours have been reviewed.  CBC:   Recent Labs   Lab 04/13/25  1645 04/15/25  1029   WBC 7.44 7.20   HGB 14.5 14.0   HCT 42.2 41.7    243     CMP:   Recent Labs   Lab 04/13/25  1645 04/15/25  1029    136   K 3.7 3.8    105   CO2 20* 22*   BUN 25* 19   CREATININE 1.7* 1.6*   CALCIUM 9.8 9.1   ALBUMIN 3.8 3.5   BILITOT 0.4 0.3   ALKPHOS 56 53   AST 18 18   ALT 13 12   ANIONGAP 14 9     Lactic Acid:   Recent Labs   Lab 04/13/25  1645 04/15/25  1029   LACTATE 1.6 1.9     Lipase:   Recent Labs   Lab 04/13/25  1645 04/15/25  1029   LIPASE 44 46     Magnesium:   Recent Labs   Lab 04/15/25  1029   MG 1.6       Significant Imaging:   Imaging Results              CT Abdomen Pelvis  Without Contrast (Final result)  Result time 04/15/25 14:04:08      Final result by Kvng Pugh MD (04/15/25 14:04:08)                   Impression:      1. Moderate length of distal small bowel wall thickening with adjoining inflammatory changes, appearing new/progressed when compared to recent CT of 04/13/2025.  Mildly prominent caliber of small-bowel in this region without evidence of toby bowel obstruction.  Differential considerations would include infectious or noninfectious inflammatory enteritis, noting that developing mechanical small bowel obstruction would be difficult to exclude, given proximity of these loops to anastomotic suture material.  2. Additional details of chronic and incidental findings, as described in the body of the report.      Electronically signed by: Kvng Pugh  Date:    04/15/2025  Time:    14:04               Narrative:    EXAMINATION:  CT ABDOMEN PELVIS WITHOUT  CONTRAST    CLINICAL HISTORY:  Bowel obstruction suspected;    TECHNIQUE:  Low dose axial images, sagittal and coronal reformations were obtained from the lung bases to the pubic symphysis.    COMPARISON:  CT of the abdomen and pelvis performed 04/13/2025.    FINDINGS:  This examination is limited due to lack of intravenous contrast.    Lower chest: Cardiac valvular and coronary artery calcifications again partially imaged.  Atelectasis suggested at the visualized lung bases.    Liver: Normal contour.    Gallbladder and bile ducts: Status post cholecystectomy.  Similar mildly prominent caliber of the bile ducts favored to reflect post cholecystectomy status.    Pancreas: Normal contour.    Spleen: Normal contour.    Adrenals: Normal contour.    Kidneys: Cortical atrophy of both kidneys.  Nonspecific mild bilateral perinephric stranding.  Fluid attenuation lesions, presumed cysts involving the kidneys.  Additional subcentimeter hypodense lesions are too small to definitively characterize.    Lymph nodes: No abdominal or pelvic lymphadenopathy.    Bowel and mesentery: Postoperative changes of bowel are again noted.  Moderate length of distal small bowel wall thickening with adjoining inflammatory changes are noted with relative proximity to surgical anastomosis within the right lower quadrant (for example as seen on axial series 2, image 92 and coronal reformat series 602, image 54).  Fecalization of distal small bowel loops again noted.  Mildly prominent caliber small bowel in this region without evidence of toby obstruction at the present time.  Colonic diverticulosis is seen.  Appendix not confidently identified.    Abdominal aorta: Nonaneurysmal.  Moderate to heavy atherosclerosis.    Inferior vena cava: Unremarkable.    Free fluid or free air: None.    Pelvis: Unremarkable.    Urinary bladder: Unremarkable.    Body wall: Remote operative sequela again noted in the anterior abdominal wall with rectus diastasis.   Protrusion of subjacent bowel loops, similar configuration to recent CT of 04/13/2025.    Bones: Unremarkable.

## 2025-04-15 NOTE — ED PROVIDER NOTES
Encounter Date: 4/15/2025       History     Chief Complaint   Patient presents with    Abdominal Pain     Pt arrives to ED for evaluation of evaluation of abdominal pain to upper abdomen and vomiting for last 5 days. Previously seen in ED  and was dx with hernia. Pt reports taking medication ordered without any symptom relief. Also attempted APAP with no relief. Pt abdomen soft and tender with palpation. Pt denies any blood noted in emesis or stool. Pt appears uncomfortable during triage.    Vomiting     Patient is a 74-year-old female presenting with diffuse severe abdominal pain.  She has also had vomiting associated with this pain.  She says the symptoms began about 5 days ago.  She was seen in this emergency department 2 days ago for the same.  CT done at that time shows        Review of patient's allergies indicates:   Allergen Reactions    Chlorhexidine gluconate Rash     Chlorhexidine/alcohol wipes. Rash and blistering.     Past Medical History:   Diagnosis Date    Chronic kidney disease, stage 3     Diabetes mellitus     Diabetes mellitus, type 2     Hematuria, unspecified     Hypercalcemia     Hypertension     Malnutrition of moderate degree 2020    Mixed hyperlipidemia 2022    Proteinuria     Renal cyst, left     UTI (urinary tract infection) 2020     Past Surgical History:   Procedure Laterality Date    ABDOMINAL SURGERY      CATHETERIZATION OF URETER Bilateral 2020    Procedure: CATHETERIZATION, URETER;  Surgeon: Kvng Cunningham MD;  Location: [facility] OR [address] [place] [address];  Service: Urology;  Laterality: Bilateral;     SECTION, CLASSIC      x2    CHOLECYSTECTOMY      CLOSURE, ILEOSTOMY, OPEN N/A 2021    Procedure: CLOSURE, ILEOSTOMY, OPEN, ERAS low;  Surgeon: Fabiana Valiente MD;  Location: [facility] OR [address] [place] [address];  Service: Colon and Rectal;  Laterality: N/A;    COLON SURGERY      [facility]    COLONOSCOPY N/A 2018    Procedure: COLONOSCOPY;  Surgeon: Gibran CLIFTON  MD Dede;  Location: [facility] ENDO;  Service: Endoscopy;  Laterality: N/A;    COLOSTOMY Left 2015    CYSTOSCOPY N/A 7/31/2020    Procedure: CYSTOSCOPY;  Surgeon: Kvng Cunningham MD;  [address];  Service: Urology;  Laterality: N/A;    CYSTOSCOPY WITH URETEROSCOPY, RETROGRADE PYELOGRAPHY, AND INSERTION OF STENT Left 2/22/2019    Procedure: CYSTOSCOPY, WITH RETROGRADE PYELOGRAM AND URETERAL STENT INSERTION with placement of a muir cath;  Surgeon: Ulisses Horton MD;  Location: [facility] OR;  Service: General;  Laterality: Left;    FLEXIBLE SIGMOIDOSCOPY N/A 2/9/2021    Procedure: SIGMOIDOSCOPY, FLEXIBLE;  Surgeon: Fabiana Valiente MD;  Location: [address];  Service: Colon and Rectal;  Laterality: N/A;    HYSTEROSCOPY WITH DILATION AND CURETTAGE OF UTERUS N/A 12/9/2020    Procedure: HYSTEROSCOPY, WITH DILATION AND CURETTAGE OF UTERUS (MYOSURE);  Surgeon: Caitlny Feng MD;  Location: [facility] OR;  Service: OB/GYN;  Laterality: N/A;  Guido notified 11/30,   video    HYSTEROSCOPY WITH DILATION AND CURETTAGE OF UTERUS N/A 3/4/2022    Procedure: HYSTEROSCOPY, WITH DILATION AND CURETTAGE OF UTERUS;  Surgeon: Caitlyn Feng MD;  Location: [facility] OR;  Service: OB/GYN;  Laterality: N/A;  Alexander notified CW 3/3  video confirmed    ILEOSTOMY  7/31/2020    Procedure: CREATION, ILEOSTOMY;  Surgeon: Fabiana Valiente MD;  Location: [address];  Service: Colon and Rectal;;    INCISION AND DRAINAGE OF ABSCESS N/A 12/22/2019    Procedure: INCISION AND DRAINAGE, ABSCESS;  Surgeon: Ulisses Horton MD;  Location: [facility] OR;  Service: General;  Laterality: N/A;    INCISION OF ABDOMINAL WALL N/A 8/10/2018    Procedure: INCISION, ABDOMINAL WALL;  Surgeon: Ulisses Horton MD;  Location: [facility] OR;  Service: General;  Laterality: N/A;    INCISIONAL HERNIA REPAIR Right 2010    LOW ANTERIOR RESECTION OF COLON N/A 7/31/2020    Procedure: RESECTION, COLON, LOW ANTERIOR;  Surgeon: Fabiana Valiente MD;  Location:  [facility] OR 2ND FLR;  Service: Colon and Rectal;  Laterality: N/A;    LYSIS OF ADHESIONS  7/31/2020    Procedure: LYSIS, ADHESIONS;  Surgeon: Fabiana Valiente MD;  Location: [facility] OR 2ND FLR;  Service: Colon and Rectal;;    SURGICAL REMOVAL OF MASS OF LOWER EXTREMITY Bilateral 10/13/2023    Procedure: EXCISION, MASS ruthy ankle Malleolus;  Surgeon: Ulisses Horton MD;  Location: [facility] OR;  Service: General;  Laterality: Bilateral;     Family History   Problem Relation Name Age of Onset    Stroke Mother      Heart disease Mother      Hyperlipidemia Mother      Hypertension Mother      Alcohol abuse Father      Stroke Sister      Diabetes Sister      Heart disease Sister      Hyperlipidemia Sister      Hypertension Sister      Diabetes Brother      Early death Grandchild      Anesthesia problems Neg Hx      Broken bones Neg Hx      Cancer Neg Hx       Social History[1]  Review of Systems   Constitutional:  Negative for fever.   Gastrointestinal:  Positive for abdominal pain, nausea and vomiting.   All other systems reviewed and are negative.      Physical Exam     Initial Vitals [04/15/25 0959]   BP Pulse Resp Temp SpO2   (!) 171/97 91 (!) 24 97.8 °F (36.6 °C) 98 %      MAP       --         Physical Exam    Nursing note and vitals reviewed.  Constitutional: She appears distressed.   Cardiovascular:  Normal rate and regular rhythm.           Pulmonary/Chest: Breath sounds normal.   Abdominal: Abdomen is soft.   Diffuse abdominal tenderness with voluntary guarding.  No rebound.  Bowel sounds are hypoactive.   Musculoskeletal:         General: No edema. Normal range of motion.     Skin: Skin is warm and dry.   Psychiatric: Thought content normal.         ED Course   Procedures  Labs Reviewed   COMPREHENSIVE METABOLIC PANEL - Abnormal       Result Value    Sodium 136      Potassium 3.8      Chloride 105      CO2 22 (*)     Glucose 307 (*)     BUN 19      Creatinine 1.6 (*)     Calcium 9.1      Protein Total 7.6       Albumin 3.5      Bilirubin Total 0.3      ALP 53      AST 18      ALT 12      Anion Gap 9      eGFR 34 (*)    URINALYSIS, REFLEX TO URINE CULTURE - Abnormal    Color, UA Yellow      Appearance, UA Clear      pH, UA 6.0      Spec Grav UA 1.030      Protein, UA 2+ (*)     Glucose, UA 4+ (*)     Ketones, UA Negative      Bilirubin, UA Negative      Blood, UA Negative      Nitrites, UA Negative      Urobilinogen, UA Negative      Leukocyte Esterase, UA Negative     CBC WITH DIFFERENTIAL - Abnormal    WBC 7.20      RBC 5.06      HGB 14.0      HCT 41.7      MCV 82      MCH 27.7      MCHC 33.6      RDW 13.9      Platelet Count 243      MPV 9.3      Nucleated RBC 0      Neut % 74.3 (*)     Lymph % 19.4      Mono % 5.0      Eos % 0.7      Basophil % 0.3      Imm Grans % 0.3      Neut # 5.35      Lymph # 1.40      Mono # 0.36      Eos # 0.05      Baso # 0.02      Imm Grans # 0.02     LACTIC ACID, PLASMA - Normal    Lactic Acid Level 1.9      Narrative:     Falsely low lactic acid results can be found in samples containing >=13.0 mg/dL total bilirubin and/or >=3.5 mg/dL direct bilirubin.    LIPASE - Normal    Lipase Level 46     MAGNESIUM - Normal    Magnesium  1.6     CBC W/ AUTO DIFFERENTIAL    Narrative:     The following orders were created for panel order CBC auto differential.  Procedure                               Abnormality         Status                     ---------                               -----------         ------                     CBC with Differential[[id number]]       Abnormal            Final result                 Please view results for these tests on the individual orders.   GREY TOP URINE HOLD    Extra Tube Hold for add-ons.     URINALYSIS MICROSCOPIC    RBC, UA 0      WBC, UA 0      Bacteria, UA None      Yeast, UA None      Hyaline Casts, UA 0      Microscopic Comment              Imaging Results              CT Abdomen Pelvis  Without Contrast (Final result)  Result time 04/15/25 14:04:08       Final result by Kvng Pugh MD (04/15/25 14:04:08)                   Impression:      1. Moderate length of distal small bowel wall thickening with adjoining inflammatory changes, appearing new/progressed when compared to recent CT of 04/13/2025.  Mildly prominent caliber of small-bowel in this region without evidence of toby bowel obstruction.  Differential considerations would include infectious or noninfectious inflammatory enteritis, noting that developing mechanical small bowel obstruction would be difficult to exclude, given proximity of these loops to anastomotic suture material.  2. Additional details of chronic and incidental findings, as described in the body of the report.      Electronically signed by: Kvng Pugh  Date:    04/15/2025  Time:    14:04               Narrative:    EXAMINATION:  CT ABDOMEN PELVIS WITHOUT CONTRAST    CLINICAL HISTORY:  Bowel obstruction suspected;    TECHNIQUE:  Low dose axial images, sagittal and coronal reformations were obtained from the lung bases to the pubic symphysis.    COMPARISON:  CT of the abdomen and pelvis performed 04/13/2025.    FINDINGS:  This examination is limited due to lack of intravenous contrast.    Lower chest: Cardiac valvular and coronary artery calcifications again partially imaged.  Atelectasis suggested at the visualized lung bases.    Liver: Normal contour.    Gallbladder and bile ducts: Status post cholecystectomy.  Similar mildly prominent caliber of the bile ducts favored to reflect post cholecystectomy status.    Pancreas: Normal contour.    Spleen: Normal contour.    Adrenals: Normal contour.    Kidneys: Cortical atrophy of both kidneys.  Nonspecific mild bilateral perinephric stranding.  Fluid attenuation lesions, presumed cysts involving the kidneys.  Additional subcentimeter hypodense lesions are too small to definitively characterize.    Lymph nodes: No abdominal or pelvic lymphadenopathy.    Bowel and mesentery: Postoperative  changes of bowel are again noted.  Moderate length of distal small bowel wall thickening with adjoining inflammatory changes are noted with relative proximity to surgical anastomosis within the right lower quadrant (for example as seen on axial series 2, image 92 and coronal reformat series 602, image 54).  Fecalization of distal small bowel loops again noted.  Mildly prominent caliber small bowel in this region without evidence of toby obstruction at the present time.  Colonic diverticulosis is seen.  Appendix not confidently identified.    Abdominal aorta: Nonaneurysmal.  Moderate to heavy atherosclerosis.    Inferior vena cava: Unremarkable.    Free fluid or free air: None.    Pelvis: Unremarkable.    Urinary bladder: Unremarkable.    Body wall: Remote operative sequela again noted in the anterior abdominal wall with rectus diastasis.  Protrusion of subjacent bowel loops, similar configuration to recent CT of 04/13/2025.    Bones: Unremarkable.                                       Medications   morphine injection 4 mg (4 mg Intravenous Given 4/15/25 1035)   ondansetron injection 4 mg (4 mg Intravenous Given 4/15/25 1033)   morphine injection 4 mg (4 mg Intravenous Given 4/15/25 1343)   ondansetron injection 4 mg (4 mg Intravenous Given 4/15/25 1347)     Medical Decision Making  Emergent evaluation of a 74-year-old female with severe abdominal pain.  Patient was seen here 2 days ago for the same.  CT done today appears to show progression of small bowel information compared to prior CT.  This may be a developing small bowel obstruction.  I feel the patient will require admission for bowel rest and further evaluation.  This has been discussed with the [facility] hospitalist for admission.    Amount and/or Complexity of Data Reviewed  Labs: ordered.     Details: CBC is unremarkable.  Lactic acid was 1.9.  Urinalysis with 2+ protein and 4+ glucose.  Radiology: ordered.     Details: CT with inflammatory changes noted  in the small bowel.  There is also a ventral hernia containing loops of bowel.    Risk  Prescription drug management.                                      Clinical Impression:  Final diagnoses:  [R10.84] Generalized abdominal pain (Primary)  [R11.2] Nausea and vomiting, unspecified vomiting type          ED Disposition Condition    Observation Stable                    [1]   Social History  Tobacco Use    Smoking status: Former     Current packs/day: 0.00     Types: Cigarettes     Quit date: 2000     Years since quittin.3    Smokeless tobacco: Never   Substance Use Topics    Alcohol use: No    Drug use: No        Robbie Cruz MD  04/15/25 [id number]

## 2025-04-15 NOTE — HPI
Ms Morrison is a 74 year old female who present to Emergnecy Room for evaluation of abdominal. She has PMHx of A Fib on Eliquis, colon surgery, DM, chronic kidney disease stage 3, HLD. Associated symptoms include vomiting and started about five days ago. She denies fever, chills, shortness of breath. Patient was seen in the Emergency Room two days ago with similar complaints, CT of done and she was discharge home. CT of the abdomen today showed moderate length of distal small bowel wall thickening with adjoining inflammatory changes, appearing new/progressed when compared to recent CT of 04/13/2025, mild small-bowel in this region without evidence of toby bowel obstruction. Differential include infectious or noninfectious inflammatory enteritis, developing mechanical small bowel obstruction would be difficult to exclude. She is a full code and place in Observation under the care of Hospital Medicine

## 2025-04-15 NOTE — ASSESSMENT & PLAN NOTE
Possible bowl obstruction vs infection/noninfectious process    Will start antibiotic for now   She has history of abdominal surgeries   Consults General Surgery for further evaluation and treatment   Bowl rest   IVF's

## 2025-04-15 NOTE — PHARMACY MED REC
"  [facility]           Pharmacy  Admission Medication History     The home medication history was taken by Zelda Garrison.      Medication history obtained from Medications listed below were obtained from: Analytic software- [employer] and Medical records.    Based on information gathered for medication list, you may go to "Admission" then "Reconcile Home Medications" tabs to review and/or act upon those items.     The home medication list has been updated by the Pharmacy department.   Please read ALL comments highlighted in yellow.   Please address this information as you see fit.    Feel free to contact us if you have any questions or require assistance.        No current facility-administered medications on file prior to encounter.     Current Outpatient Medications on File Prior to Encounter   Medication Sig Dispense Refill    acetaminophen (TYLENOL) 500 MG tablet Take 1 tablet (500 mg total) by mouth every 6 (six) hours as needed for Pain. 20 tablet 0    apixaban (ELIQUIS) 5 mg Tab Take 1 tablet (5 mg total) by mouth 2 (two) times daily. 60 tablet 11    BD ONEIL 2ND GEN PEN NEEDLE 32 gauge x 5/32" Ndle Inject 1 Needle into the skin 3 (three) times daily. 200 each 11    carvedilol PHOSPHATE 40 MG ORAL CM24 (COREG CR) 40 MG CM24 Take 1 capsule (40 mg total) by mouth once daily. 30 capsule 11    cephALEXin (KEFLEX) 500 MG capsule Take 1 capsule (500 mg total) by mouth every 12 (twelve) hours. for 7 days 14 capsule 0    empagliflozin (JARDIANCE) 25 mg tablet Take 1 tablet (25 mg total) by mouth once daily. 90 tablet 3    fenofibrate micronized (LOFIBRA) 200 MG Cap Take 200 mg by mouth daily with breakfast.      finerenone (KERENDIA) 10 mg Tab Take 1 tablet by mouth once daily. 30 tablet 11    icosapent ethyl (VASCEPA ORAL) Take 1 mg by mouth.      insulin glargine U-100, Lantus, 100 unit/mL (3 mL) SubQ InPn pen Inject 25 Units into the skin 2 (two) times daily. 15 mL 11    olmesartan (BENICAR) 40 MG " tablet Take 40 mg by mouth once daily.      ondansetron (ZOFRAN-ODT) 4 MG TbDL Take 1 tablet (4 mg total) by mouth every 6 (six) hours as needed. 20 tablet 0    pantoprazole (PROTONIX) 40 MG tablet Take 1 tablet (40 mg total) by mouth once daily. 60 tablet 1    rosuvastatin (CRESTOR) 40 MG Tab Take 40 mg by mouth every evening.      sodium bicarbonate 650 MG tablet Take 1 tablet (650 mg total) by mouth 3 (three) times daily. 90 tablet 11    tirzepatide 7.5 mg/0.5 mL PnIj Inject 7.5 mg into the skin every Sunday. 2 mL 55    blood sugar diagnostic Strp 100 each by Misc.(Non-Drug; Combo Route) route once daily. 100 each 2    diclofenac sodium (VOLTAREN) 1 % Gel Apply 2 g topically 4 (four) times daily. 100 g 1    docusate sodium (COLACE) 100 MG capsule Take 1 capsule (100 mg total) by mouth 2 (two) times daily. 60 capsule 0    polyethylene glycol (GLYCOLAX) 17 gram PwPk Take 17 g by mouth once daily. 30 each 0    solifenacin (VESICARE) 10 MG tablet Take 5 mg by mouth once daily.         Please address this information as you see fit.  Feel free to contact us if you have any questions or require assistance.    Zelda Garrison  [phone]                .

## 2025-04-16 PROBLEM — K52.9 ENTERITIS: Status: ACTIVE | Noted: 2025-04-16

## 2025-04-16 LAB
ABSOLUTE EOSINOPHIL (OHS): 0.11 K/UL
ABSOLUTE MONOCYTE (OHS): 0.44 K/UL (ref 0.3–1)
ABSOLUTE NEUTROPHIL COUNT (OHS): 5.25 K/UL (ref 1.8–7.7)
ALBUMIN SERPL BCP-MCNC: 3.3 G/DL (ref 3.5–5.2)
ALP SERPL-CCNC: 57 UNIT/L (ref 40–150)
ALT SERPL W/O P-5'-P-CCNC: 22 UNIT/L (ref 10–44)
ANION GAP (OHS): 10 MMOL/L (ref 8–16)
AST SERPL-CCNC: 41 UNIT/L (ref 11–45)
BASOPHILS # BLD AUTO: 0.04 K/UL
BASOPHILS NFR BLD AUTO: 0.6 %
BILIRUB SERPL-MCNC: 0.5 MG/DL (ref 0.1–1)
BUN SERPL-MCNC: 21 MG/DL (ref 8–23)
CALCIUM SERPL-MCNC: 9.4 MG/DL (ref 8.7–10.5)
CHLORIDE SERPL-SCNC: 105 MMOL/L (ref 95–110)
CO2 SERPL-SCNC: 22 MMOL/L (ref 23–29)
CREAT SERPL-MCNC: 1.8 MG/DL (ref 0.5–1.4)
ERYTHROCYTE [DISTWIDTH] IN BLOOD BY AUTOMATED COUNT: 14.2 % (ref 11.5–14.5)
GFR SERPLBLD CREATININE-BSD FMLA CKD-EPI: 29 ML/MIN/1.73/M2
GLUCOSE SERPL-MCNC: 154 MG/DL (ref 70–110)
HCT VFR BLD AUTO: 42.2 % (ref 37–48.5)
HGB BLD-MCNC: 13.9 GM/DL (ref 12–16)
IMM GRANULOCYTES # BLD AUTO: 0.02 K/UL (ref 0–0.04)
IMM GRANULOCYTES NFR BLD AUTO: 0.3 % (ref 0–0.5)
LYMPHOCYTES # BLD AUTO: 1.34 K/UL (ref 1–4.8)
MCH RBC QN AUTO: 27.5 PG (ref 27–31)
MCHC RBC AUTO-ENTMCNC: 32.9 G/DL (ref 32–36)
MCV RBC AUTO: 84 FL (ref 82–98)
NUCLEATED RBC (/100WBC) (OHS): 0 /100 WBC
PLATELET # BLD AUTO: 248 K/UL (ref 150–450)
PMV BLD AUTO: 9.1 FL (ref 9.2–12.9)
POCT GLUCOSE: 113 MG/DL (ref 70–110)
POCT GLUCOSE: 118 MG/DL (ref 70–110)
POCT GLUCOSE: 136 MG/DL (ref 70–110)
POCT GLUCOSE: 155 MG/DL (ref 70–110)
POCT GLUCOSE: 158 MG/DL (ref 70–110)
POTASSIUM SERPL-SCNC: 3.8 MMOL/L (ref 3.5–5.1)
PROT SERPL-MCNC: 7.4 GM/DL (ref 6–8.4)
RBC # BLD AUTO: 5.05 M/UL (ref 4–5.4)
RELATIVE EOSINOPHIL (OHS): 1.5 %
RELATIVE LYMPHOCYTE (OHS): 18.6 % (ref 18–48)
RELATIVE MONOCYTE (OHS): 6.1 % (ref 4–15)
RELATIVE NEUTROPHIL (OHS): 72.9 % (ref 38–73)
SODIUM SERPL-SCNC: 137 MMOL/L (ref 136–145)
WBC # BLD AUTO: 7.2 K/UL (ref 3.9–12.7)

## 2025-04-16 PROCEDURE — 25000003 PHARM REV CODE 250: Performed by: NURSE PRACTITIONER

## 2025-04-16 PROCEDURE — 85025 COMPLETE CBC W/AUTO DIFF WBC: CPT | Performed by: NURSE PRACTITIONER

## 2025-04-16 PROCEDURE — 99900035 HC TECH TIME PER 15 MIN (STAT)

## 2025-04-16 PROCEDURE — 36415 COLL VENOUS BLD VENIPUNCTURE: CPT | Performed by: NURSE PRACTITIONER

## 2025-04-16 PROCEDURE — 94761 N-INVAS EAR/PLS OXIMETRY MLT: CPT

## 2025-04-16 PROCEDURE — 63600175 PHARM REV CODE 636 W HCPCS: Performed by: NURSE PRACTITIONER

## 2025-04-16 PROCEDURE — 80053 COMPREHEN METABOLIC PANEL: CPT | Performed by: NURSE PRACTITIONER

## 2025-04-16 PROCEDURE — 11000001 HC ACUTE MED/SURG PRIVATE ROOM

## 2025-04-16 PROCEDURE — S5010 5% DEXTROSE AND 0.45% SALINE: HCPCS | Performed by: NURSE PRACTITIONER

## 2025-04-16 PROCEDURE — 96372 THER/PROPH/DIAG INJ SC/IM: CPT | Performed by: NURSE PRACTITIONER

## 2025-04-16 PROCEDURE — 99223 1ST HOSP IP/OBS HIGH 75: CPT | Mod: ,,, | Performed by: STUDENT IN AN ORGANIZED HEALTH CARE EDUCATION/TRAINING PROGRAM

## 2025-04-16 RX ORDER — MORPHINE SULFATE 2 MG/ML
1 INJECTION, SOLUTION INTRAMUSCULAR; INTRAVENOUS EVERY 4 HOURS PRN
Status: DISCONTINUED | OUTPATIENT
Start: 2025-04-16 | End: 2025-04-20 | Stop reason: HOSPADM

## 2025-04-16 RX ORDER — DEXTROSE MONOHYDRATE AND SODIUM CHLORIDE 5; .45 G/100ML; G/100ML
INJECTION, SOLUTION INTRAVENOUS CONTINUOUS
Status: DISCONTINUED | OUTPATIENT
Start: 2025-04-16 | End: 2025-04-19

## 2025-04-16 RX ORDER — FAMOTIDINE 10 MG/ML
20 INJECTION, SOLUTION INTRAVENOUS EVERY 24 HOURS
Status: DISCONTINUED | OUTPATIENT
Start: 2025-04-17 | End: 2025-04-20 | Stop reason: HOSPADM

## 2025-04-16 RX ORDER — HYDRALAZINE HYDROCHLORIDE 20 MG/ML
5 INJECTION INTRAMUSCULAR; INTRAVENOUS EVERY 6 HOURS PRN
Status: DISCONTINUED | OUTPATIENT
Start: 2025-04-16 | End: 2025-04-20 | Stop reason: HOSPADM

## 2025-04-16 RX ORDER — INSULIN ASPART 100 [IU]/ML
0-5 INJECTION, SOLUTION INTRAVENOUS; SUBCUTANEOUS EVERY 6 HOURS PRN
Status: DISCONTINUED | OUTPATIENT
Start: 2025-04-16 | End: 2025-04-20 | Stop reason: HOSPADM

## 2025-04-16 RX ORDER — GLUCAGON 1 MG
1 KIT INJECTION
Status: DISCONTINUED | OUTPATIENT
Start: 2025-04-16 | End: 2025-04-20 | Stop reason: HOSPADM

## 2025-04-16 RX ADMIN — DEXTROSE AND SODIUM CHLORIDE: 5; 450 INJECTION, SOLUTION INTRAVENOUS at 03:04

## 2025-04-16 RX ADMIN — INSULIN GLARGINE 10 UNITS: 100 INJECTION, SOLUTION SUBCUTANEOUS at 08:04

## 2025-04-16 RX ADMIN — ONDANSETRON 4 MG: 4 TABLET, ORALLY DISINTEGRATING ORAL at 02:04

## 2025-04-16 RX ADMIN — PANTOPRAZOLE SODIUM 40 MG: 40 TABLET, DELAYED RELEASE ORAL at 08:04

## 2025-04-16 RX ADMIN — ONDANSETRON 4 MG: 4 TABLET, ORALLY DISINTEGRATING ORAL at 01:04

## 2025-04-16 RX ADMIN — VALSARTAN 320 MG: 160 TABLET, FILM COATED ORAL at 08:04

## 2025-04-16 RX ADMIN — INSULIN ASPART 2 UNITS: 100 INJECTION, SOLUTION INTRAVENOUS; SUBCUTANEOUS at 12:04

## 2025-04-16 RX ADMIN — CARVEDILOL 12.5 MG: 12.5 TABLET, FILM COATED ORAL at 08:04

## 2025-04-16 RX ADMIN — MORPHINE SULFATE 2 MG: 2 INJECTION, SOLUTION INTRAMUSCULAR; INTRAVENOUS at 01:04

## 2025-04-16 RX ADMIN — PIPERACILLIN AND TAZOBACTAM 4.5 G: 4; .5 INJECTION, POWDER, LYOPHILIZED, FOR SOLUTION INTRAVENOUS; PARENTERAL at 06:04

## 2025-04-16 RX ADMIN — SODIUM BICARBONATE 650 MG: 650 TABLET ORAL at 08:04

## 2025-04-16 RX ADMIN — ATORVASTATIN CALCIUM 40 MG: 40 TABLET, FILM COATED ORAL at 08:04

## 2025-04-16 RX ADMIN — MORPHINE SULFATE 1 MG: 2 INJECTION, SOLUTION INTRAMUSCULAR; INTRAVENOUS at 09:04

## 2025-04-16 RX ADMIN — PIPERACILLIN AND TAZOBACTAM 4.5 G: 4; .5 INJECTION, POWDER, LYOPHILIZED, FOR SOLUTION INTRAVENOUS; PARENTERAL at 11:04

## 2025-04-16 RX ADMIN — MORPHINE SULFATE 1 MG: 2 INJECTION, SOLUTION INTRAMUSCULAR; INTRAVENOUS at 02:04

## 2025-04-16 RX ADMIN — INSULIN GLARGINE 10 UNITS: 100 INJECTION, SOLUTION SUBCUTANEOUS at 09:04

## 2025-04-16 RX ADMIN — MORPHINE SULFATE 2 MG: 2 INJECTION, SOLUTION INTRAMUSCULAR; INTRAVENOUS at 08:04

## 2025-04-16 RX ADMIN — PIPERACILLIN AND TAZOBACTAM 4.5 G: 4; .5 INJECTION, POWDER, LYOPHILIZED, FOR SOLUTION INTRAVENOUS; PARENTERAL at 01:04

## 2025-04-16 NOTE — ASSESSMENT & PLAN NOTE
Possible bowl obstruction vs infection/noninfectious process    Will start antibiotic for now   She has history of abdominal surgeries   Consults General Surgery for further evaluation and treatment   Bowl rest   IVF's       04/16:  -Continue bowel rest  -Start gentle fluids; Dextrose 5% 1/2 Normal Saline 50 ml/hr  -General surgery consult pending    04/17:  -Diffuse tenderness remains  -Evaluated by General surgery yesterday; conservative managed for now  -Continue bowel rest and gentle IV fluids

## 2025-04-16 NOTE — CONSULTS
[facility]  General Surgery  Consult Note    Patient Name: Azucena Morrison  MRN: [id number]  Code Status: Full Code  Admission Date: 4/15/2025  Hospital Length of Stay: 0 days  Attending Physician: Kathrine De Santiago MD  Primary Care Provider: Pj Sanches MD    Patient information was obtained from patient, past medical records, and ER records.     Inpatient consult to General Surgery  Consult performed by: Suzette Sparrow MD  Consult ordered by: Guido Paiz NP        Subjective:     Principal Problem: Enteritis    History of Present Illness: Azucena Morrison is a 74 year old female with a PMH of T2DM, HTN, CKD3, HLD, A fib on eliquis who was admitted to the hospital for abdominal pain on 4/15. 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 Her last surgery was in 2020. Since this time, she has had chronic abdominal pain that comes and goes. She presented yesterday with worsening RLQ pain with associated nausea, vomiting and diarrhea. She has been having these symptoms for about 4 days. She denies fevers or chills at home. She has not had blood in the stool.     No cardiac issues  On eliquis, last dose was PTA  Surgical history as noted above.     Labs without leukocytosis.   CT scan somewhat limited because it is non con but shows some inflammation of the small bowel in the RLQ. No clear appendicitis. She does appear to have some slow transit through the anastomosis but it does not appear obstructed.     No current facility-administered medications on file prior to encounter.     Current Outpatient Medications on File Prior to Encounter   Medication Sig    acetaminophen (TYLENOL) 500 MG tablet Take 1 tablet  "(500 mg total) by mouth every 6 (six) hours as needed for Pain.    apixaban (ELIQUIS) 5 mg Tab Take 1 tablet (5 mg total) by mouth 2 (two) times daily.    BD ONEIL 2ND GEN PEN NEEDLE 32 gauge x 5/32" Ndle Inject 1 Needle into the skin 3 (three) times daily.    carvedilol PHOSPHATE 40 MG ORAL CM24 (COREG CR) 40 MG CM24 Take 1 capsule (40 mg total) by mouth once daily.    cephALEXin (KEFLEX) 500 MG capsule Take 1 capsule (500 mg total) by mouth every 12 (twelve) hours. for 7 days    empagliflozin (JARDIANCE) 25 mg tablet Take 1 tablet (25 mg total) by mouth once daily.    fenofibrate micronized (LOFIBRA) 200 MG Cap Take 200 mg by mouth daily with breakfast.    finerenone (KERENDIA) 10 mg Tab Take 1 tablet by mouth once daily.    icosapent ethyl (VASCEPA ORAL) Take 1 mg by mouth.    insulin glargine U-100, Lantus, 100 unit/mL (3 mL) SubQ InPn pen Inject 25 Units into the skin 2 (two) times daily.    olmesartan (BENICAR) 40 MG tablet Take 40 mg by mouth once daily.    ondansetron (ZOFRAN-ODT) 4 MG TbDL Take 1 tablet (4 mg total) by mouth every 6 (six) hours as needed.    pantoprazole (PROTONIX) 40 MG tablet Take 1 tablet (40 mg total) by mouth once daily.    rosuvastatin (CRESTOR) 40 MG Tab Take 40 mg by mouth every evening.    sodium bicarbonate 650 MG tablet Take 1 tablet (650 mg total) by mouth 3 (three) times daily.    tirzepatide 7.5 mg/0.5 mL PnIj Inject 7.5 mg into the skin every Sunday.    blood sugar diagnostic Strp 100 each by Misc.(Non-Drug; Combo Route) route once daily.    diclofenac sodium (VOLTAREN) 1 % Gel Apply 2 g topically 4 (four) times daily.    docusate sodium (COLACE) 100 MG capsule Take 1 capsule (100 mg total) by mouth 2 (two) times daily.    polyethylene glycol (GLYCOLAX) 17 gram PwPk Take 17 g by mouth once daily.    solifenacin (VESICARE) 10 MG tablet Take 5 mg by mouth once daily.    triamcinolone acetonide 0.1% (KENALOG) 0.1 % ointment Apply topically 2 (two) times daily.    TRUE METRIX " GLUCOSE TEST STRIP Strp Inject 1 strip into the skin 3 (three) times daily.    TRUEPLUS LANCETS 30 gauge Misc Inject 1 lancet  into the skin 3 (three) times daily.       Review of patient's allergies indicates:   Allergen Reactions    Chlorhexidine gluconate Rash     Chlorhexidine/alcohol wipes. Rash and blistering.       Past Medical History:   Diagnosis Date    Chronic kidney disease, stage 3     Diabetes mellitus     Diabetes mellitus, type 2     Hematuria, unspecified     Hypercalcemia     Hypertension     Malnutrition of moderate degree 2020    Mixed hyperlipidemia 2022    Proteinuria     Renal cyst, left     UTI (urinary tract infection) 2020     Past Surgical History:   Procedure Laterality Date    ABDOMINAL SURGERY      CATHETERIZATION OF URETER Bilateral 2020    Procedure: CATHETERIZATION, URETER;  Surgeon: Kvng Cunningham MD;  Location: [address];  Service: Urology;  Laterality: Bilateral;     SECTION, CLASSIC      x2    CHOLECYSTECTOMY  1997    CLOSURE, ILEOSTOMY, OPEN N/A 2021    Procedure: CLOSURE, ILEOSTOMY, OPEN, ERAS low;  Surgeon: Fabiana Valiente MD;  Location: [address];  Service: Colon and Rectal;  Laterality: N/A;    COLON SURGERY      [facility]    COLONOSCOPY N/A 2018    Procedure: COLONOSCOPY;  Surgeon: Gibran Aguayo MD;  Location: [facility];  Service: Endoscopy;  Laterality: N/A;    COLOSTOMY Left     CYSTOSCOPY N/A 2020    Procedure: CYSTOSCOPY;  Surgeon: Kvng Cunningham MD;  Location: [address];  Service: Urology;  Laterality: N/A;    CYSTOSCOPY WITH URETEROSCOPY, RETROGRADE PYELOGRAPHY, AND INSERTION OF STENT Left 2019    Procedure: CYSTOSCOPY, WITH RETROGRADE PYELOGRAM AND URETERAL STENT INSERTION with placement of a muir cath;  Surgeon: Ulisses Horton MD;  Location: [facility];  Service: General;  Laterality: Left;    FLEXIBLE SIGMOIDOSCOPY N/A 2021    Procedure: SIGMOIDOSCOPY, FLEXIBLE;   Surgeon: Fabiana Valiente MD;  Location: [facility] OR 2ND FLR;  Service: Colon and Rectal;  Laterality: N/A;    HYSTEROSCOPY WITH DILATION AND CURETTAGE OF UTERUS N/A 12/9/2020    Procedure: HYSTEROSCOPY, WITH DILATION AND CURETTAGE OF UTERUS (MYOSURE);  Surgeon: Caitlyn Feng MD;  Location: [facility] OR;  Service: OB/GYN;  Laterality: N/A;  Guido notified 11/30,   video    HYSTEROSCOPY WITH DILATION AND CURETTAGE OF UTERUS N/A 3/4/2022    Procedure: HYSTEROSCOPY, WITH DILATION AND CURETTAGE OF UTERUS;  Surgeon: Caitlyn Feng MD;  Location: [facility] OR;  Service: OB/GYN;  Laterality: N/A;  Alexander notified CW 3/3  video confirmed    ILEOSTOMY  7/31/2020    Procedure: CREATION, ILEOSTOMY;  Surgeon: Fabiana Valiente MD;  Location: [facility] OR 2ND FLR;  Service: Colon and Rectal;;    INCISION AND DRAINAGE OF ABSCESS N/A 12/22/2019    Procedure: INCISION AND DRAINAGE, ABSCESS;  Surgeon: Ulisses Horton MD;  Location: [facility] OR;  Service: General;  Laterality: N/A;    INCISION OF ABDOMINAL WALL N/A 8/10/2018    Procedure: INCISION, ABDOMINAL WALL;  Surgeon: Ulisses Horton MD;  Location: [facility] OR;  Service: General;  Laterality: N/A;    INCISIONAL HERNIA REPAIR Right 2010    LOW ANTERIOR RESECTION OF COLON N/A 7/31/2020    Procedure: RESECTION, COLON, LOW ANTERIOR;  Surgeon: Fabiana Valiente MD;  Location: [facility] OR 2ND FLR;  Service: Colon and Rectal;  Laterality: N/A;    LYSIS OF ADHESIONS  7/31/2020    Procedure: LYSIS, ADHESIONS;  Surgeon: Fabiana Valiente MD;  Location: [facility] OR 2ND FLR;  Service: Colon and Rectal;;    SURGICAL REMOVAL OF MASS OF LOWER EXTREMITY Bilateral 10/13/2023    Procedure: EXCISION, MASS ruthy ankle Malleolus;  Surgeon: Ulisses Horton MD;  Location: [facility] OR;  Service: General;  Laterality: Bilateral;     Family History       Problem Relation (Age of Onset)    Alcohol abuse Father    Diabetes Sister, Brother    Early death Grandchild    Heart disease Mother, Sister    Hyperlipidemia Mother,  Sister    Hypertension Mother, Sister    Stroke Mother, Sister          Tobacco Use    Smoking status: Former     Current packs/day: 0.00     Types: Cigarettes     Quit date: 2000     Years since quittin.3    Smokeless tobacco: Never   Substance and Sexual Activity    Alcohol use: No    Drug use: No    Sexual activity: Not Currently     Review of Systems   Constitutional:  Negative for chills and fever.   Respiratory:  Negative for shortness of breath.    Cardiovascular:  Negative for chest pain.   Gastrointestinal:  Positive for abdominal pain, diarrhea, nausea and vomiting. Negative for abdominal distention.     Objective:     Vital Signs (Most Recent):  Temp: 97.8 °F (36.6 °C) (25 07)  Pulse: 74 (25 07)  Resp: 18 (25 0810)  BP: 129/80 (25)  SpO2: 97 % (25) Vital Signs (24h Range):  Temp:  [97.5 °F (36.4 °C)-97.9 °F (36.6 °C)] 97.8 °F (36.6 °C)  Pulse:  [72-82] 74  Resp:  [18] 18  SpO2:  [94 %-97 %] 97 %  BP: (123-186)/(75-99) 129/80     Weight: 78.8 kg (173 lb 11.6 oz)  Body mass index is 31.77 kg/m².     Physical Exam  Vitals reviewed.   Constitutional:       General: She is not in acute distress.  HENT:      Head: Normocephalic and atraumatic.   Cardiovascular:      Rate and Rhythm: Normal rate and regular rhythm.   Pulmonary:      Effort: Pulmonary effort is normal. No respiratory distress.   Abdominal:      General: There is no distension.      Palpations: Abdomen is soft.      Tenderness: There is abdominal tenderness (RLQ). There is no guarding or rebound.      Comments: Prior midline incision as well as prior colostomy and prior ileostomy site well healed. No palpable hernia defect   Skin:     General: Skin is warm and dry.   Neurological:      General: No focal deficit present.      Mental Status: She is alert and oriented to person, place, and time.            I have reviewed all pertinent lab results within the past 24 hours.  CBC:   Recent Labs   Lab  04/16/25  0512   WBC 7.20   RBC 5.05   HGB 13.9   HCT 42.2      MCV 84   MCH 27.5   MCHC 32.9     CMP:   Recent Labs   Lab 04/16/25  0512   CALCIUM 9.4   ALBUMIN 3.3*      K 3.8   CO2 22*      BUN 21   CREATININE 1.8*   ALKPHOS 57   ALT 22   AST 41   BILITOT 0.5       Significant Diagnostics:  I have reviewed all pertinent imaging results/findings within the past 24 hours.    Assessment/Plan:     * Enteritis  74 year old female with a PMH of T2DM, HTN, CKD3, HLD, A fib on eliquis who was admitted to the hospital for abdominal pain on 4/15. She has an extensive history of abdominal surgeries.     CT scan without evidence of surgical pathology. Likely has slow transit through anastomosis. Wall thickened could be chronic or related to an enteritis which is likely in the setting of diarrhea.  No evidence of appendicitis or obstruction.    No acute surgical intervention at this time.  Recommend supportive care        VTE Risk Mitigation (From admission, onward)           Ordered     IP VTE HIGH RISK PATIENT  Once         04/15/25 1505     Place sequential compression device  Until discontinued         04/15/25 1505                    Thank you for your consult. I will sign off. Please contact us if you have any additional questions.    Suzette Sparrow MD  General Surgery  [facility] [facility]

## 2025-04-16 NOTE — PLAN OF CARE
[facility] - Telemetry  Initial Discharge Assessment       Primary Care Provider: Pj Sanches MD    Admission Diagnosis: Generalized abdominal pain [R10.84]  Nausea and vomiting, unspecified vomiting type [R11.2]    Admission Date: 4/15/2025  Expected Discharge Date: 4/17/2025    Consult: gen surg    Payor: HUMANA MANAGED MEDICARE / Plan: HUMANA SNP HMO PPO SPECIAL NEEDS / Product Type: Medicare Advantage /     Extended Emergency Contact Information  Primary Emergency Contact: KrishanAzucena  Address: [address] [place] [address] [place]  Mobile Phone: [phone]  Relation: Relative  Preferred language: English   needed? No  Secondary Emergency Contact: Andry Nickerson  Address: [address] [place] [address] [place]  Mobile Phone: [phone]  Relation: Son    Discharge Plan A: (P) Home with family  Discharge Plan B: (P) Home Health      [id number] - [place] - [address] AT [facility] [address]  [address]  [place] [address]  Phone: [phone] Fax: [phone]    [facility] [place]  [address]  [place] [address]  Phone: [phone] Fax: [phone]      Initial Assessment (most recent)       Adult Discharge Assessment - 04/16/25 1250          Discharge Assessment    Assessment Type Discharge Planning Assessment (P)      Confirmed/corrected address, phone number and insurance Yes (P)      Confirmed Demographics Correct on Facesheet (P)      Source of Information patient (P)      Communicated LELE with patient/caregiver Date not available/Unable to determine (P)      People in Home child(felicitas), adult;grandchild(felicitas);other relative(s) (P)    Andry mari ([phone]), D-in-L, & GC    Do you expect to return to your current living situation? Yes (P)      Do you have help at home or someone to help you manage your care at home? Yes (P)      Prior to  hospitilization cognitive status: Alert/Oriented (P)      Current cognitive status: Alert/Oriented (P)      Equipment Currently Used at Home blood pressure machine;glucometer (P)      Readmission within 30 days? No (P)      Patient currently being followed by outpatient case management? No (P)      Do you currently have service(s) that help you manage your care at home? No (P)      Do you take prescription medications? Yes (P)      Do you have prescription coverage? Yes (P)      Do you have any problems affording any of your prescribed medications? No (P)      Is the patient taking medications as prescribed? yes (P)      How do you get to doctors appointments? car, drives self;family or friend will provide (P)      Are you on dialysis? No (P)      Do you take coumadin? No (P)      Discharge Plan A Home with family (P)      Discharge Plan B Home Health (P)      DME Needed Upon Discharge  none (P)      Discharge Plan discussed with: Patient (P)         Physical Activity    On average, how many days per week do you engage in moderate to strenuous exercise (like a brisk walk)? 0 days (P)      On average, how many minutes do you engage in exercise at this level? 0 min (P)         Financial Resource Strain    How hard is it for you to pay for the very basics like food, housing, medical care, and heating? Hard (P)         Housing Stability    In the last 12 months, was there a time when you were not able to pay the mortgage or rent on time? Yes (P)      At any time in the past 12 months, were you homeless or living in a shelter (including now)? No (P)         Transportation Needs    In the past 12 months, has lack of transportation kept you from medical appointments or from getting medications? No (P)      In the past 12 months, has lack of transportation kept you from meetings, work, or from getting things needed for daily living? No (P)         Food Insecurity    Within the past 12 months, you worried that your food  "would run out before you got the money to buy more. Sometimes true (P)      Within the past 12 months, the food you bought just didn't last and you didn't have money to get more. Sometimes true (P)         Stress    Do you feel stress - tense, restless, nervous, or anxious, or unable to sleep at night because your mind is troubled all the time - these days? Not at all (P)         Social Isolation    How often do you feel lonely or isolated from those around you?  Never (P)         Alcohol Use    Q1: How often do you have a drink containing alcohol? Never (P)      Q2: How many drinks containing alcohol do you have on a typical day when you are drinking? Patient does not drink (P)      Q3: How often do you have six or more drinks on one occasion? Never (P)         [place]    In the past 12 months has the electric, gas, oil, or water company threatened to shut off services in your home? Yes (P)         Health Literacy    How often do you need to have someone help you when you read instructions, pamphlets, or other written material from your doctor or pharmacy? Rarely (P)    pt wears glasses & needs AVS printed in Italian                     1250  Patient resting quietly in bed when CM rounded. No family present. Patient was admitted with abd pain & is being followed by  gen surg. Gen surg MDs rounded on the pt while CM present.     Patient lives with her adult son, Andry Nickerson, D-in-L, & GC, is independent of all ADLs, & denied the need for assistance with transportation at time of discharge.     CM updated patient's whiteboard with CM name & contact information.     1640  Order for "ambulatory referral to out-patient CM" entered due to pt's responses to SID.     Message sent to the schedulers requesting a hospfu appt with Dr Guzmán. Awaiting response.       Will continue to follow.             "

## 2025-04-16 NOTE — ASSESSMENT & PLAN NOTE
Patient has persistent (7 days or more) atrial fibrillation. Patient is currently in atrial fibrillation. [id number] Score: 3. The patients heart rate in the last 24 hours is as follows:  Pulse  Min: 60  Max: 79     Antiarrhythmics       Anticoagulants       Plan  - Replete lytes with a goal of K>4, Mg >2  - Patient is anticoagulated, see medications listed above.  - Patient's afib is currently controlled  - Will hold Eliquis until general surgery evaluation.     04/16:  -Continuous cardiac monitoring  -Eliquis on hold since admission    04/17:  -Eliquis remains on hold  -Remains in sinus rhythm; continuous cardiac monitoring

## 2025-04-16 NOTE — ASSESSMENT & PLAN NOTE
-Possible bowl obstruction vs infection/noninfectious process  -Consult General Surgery for further evaluation and treatment    04/16:  -Complete bowel rest  -Start gentle fluids    04/17:  -Evaluated by General surgery yesterday; appreciate the input  -Continue bowel rest and gentle fluids

## 2025-04-16 NOTE — SUBJECTIVE & OBJECTIVE
Interval History: Patient seen and examined; chart reviewed.  Interpretation services provided by Chiquis ([id number]).  Reports diffuse right-sided abdominal pain.  Evaluated by General surgery on yesterday.  No immediate intervention at this time    Review of Systems   Constitutional:  Positive for appetite change and fatigue.   HENT: Negative.     Eyes: Negative.    Respiratory: Negative.     Cardiovascular: Negative.    Gastrointestinal:  Positive for abdominal distention and abdominal pain.   Endocrine: Negative.    Genitourinary: Negative.    Musculoskeletal: Negative.    Skin: Negative.    Allergic/Immunologic: Negative.    Neurological: Negative.    Hematological: Negative.    Psychiatric/Behavioral: Negative.       Objective:     Vital Signs (Most Recent):  Temp: 97.8 °F (36.6 °C) (04/16/25 0741)  Pulse: 74 (04/16/25 0741)  Resp: 18 (04/16/25 0810)  BP: 129/80 (04/16/25 0741)  SpO2: 97 % (04/16/25 0741) Vital Signs (24h Range):  Temp:  [97.5 °F (36.4 °C)-97.9 °F (36.6 °C)] 97.8 °F (36.6 °C)  Pulse:  [72-82] 74  Resp:  [18] 18  SpO2:  [94 %-97 %] 97 %  BP: (123-186)/(75-99) 129/80     Weight: 78.8 kg (173 lb 11.6 oz)  Body mass index is 31.77 kg/m².  No intake or output data in the 24 hours ending 04/16/25 1223      Physical Exam  Constitutional:       Appearance: She is ill-appearing.   HENT:      Head: Normocephalic and atraumatic.      Nose: Nose normal.      Mouth/Throat:      Mouth: Mucous membranes are dry.      Pharynx: Oropharynx is clear.   Eyes:      Extraocular Movements: Extraocular movements intact.      Conjunctiva/sclera: Conjunctivae normal.      Pupils: Pupils are equal, round, and reactive to light.   Cardiovascular:      Rate and Rhythm: Normal rate.      Pulses: Normal pulses.      Heart sounds: Normal heart sounds.   Pulmonary:      Effort: Pulmonary effort is normal.      Breath sounds: Normal breath sounds.   Abdominal:      General: There is distension.      Palpations: Abdomen is soft.       Tenderness: There is abdominal tenderness. There is guarding.      Comments: Bowel sounds faint   Musculoskeletal:         General: Normal range of motion.      Cervical back: Normal range of motion and neck supple.   Skin:     General: Skin is warm and dry.      Capillary Refill: Capillary refill takes less than 2 seconds.   Neurological:      Mental Status: She is alert and oriented to person, place, and time.   Psychiatric:         Mood and Affect: Mood normal.         Behavior: Behavior normal.         Thought Content: Thought content normal.         Judgment: Judgment normal.               Significant Labs: All pertinent labs within the past 24 hours have been reviewed.    Significant Imaging: I have reviewed all pertinent imaging results/findings within the past 24 hours.  I have reviewed and interpreted all pertinent imaging results/findings within the past 24 hours.

## 2025-04-16 NOTE — ASSESSMENT & PLAN NOTE
Accu checks every 6 hour with SSI   Continue Lantus at half dose     04/16:  -Blood glucose levels reviewed  -D/W pharmacy; plan to discontinue basal insulin for now  -Continue low dose correction    04/17:  -Basal insulin dose discontinued on yesterday  -Continue Accu-Cheks with regular insulin correction

## 2025-04-16 NOTE — SUBJECTIVE & OBJECTIVE
"No current facility-administered medications on file prior to encounter.     Current Outpatient Medications on File Prior to Encounter   Medication Sig    acetaminophen (TYLENOL) 500 MG tablet Take 1 tablet (500 mg total) by mouth every 6 (six) hours as needed for Pain.    apixaban (ELIQUIS) 5 mg Tab Take 1 tablet (5 mg total) by mouth 2 (two) times daily.    BD ONEIL 2ND GEN PEN NEEDLE 32 gauge x 5/32" Ndle Inject 1 Needle into the skin 3 (three) times daily.    carvedilol PHOSPHATE 40 MG ORAL CM24 (COREG CR) 40 MG CM24 Take 1 capsule (40 mg total) by mouth once daily.    cephALEXin (KEFLEX) 500 MG capsule Take 1 capsule (500 mg total) by mouth every 12 (twelve) hours. for 7 days    empagliflozin (JARDIANCE) 25 mg tablet Take 1 tablet (25 mg total) by mouth once daily.    fenofibrate micronized (LOFIBRA) 200 MG Cap Take 200 mg by mouth daily with breakfast.    finerenone (KERENDIA) 10 mg Tab Take 1 tablet by mouth once daily.    icosapent ethyl (VASCEPA ORAL) Take 1 mg by mouth.    insulin glargine U-100, Lantus, 100 unit/mL (3 mL) SubQ InPn pen Inject 25 Units into the skin 2 (two) times daily.    olmesartan (BENICAR) 40 MG tablet Take 40 mg by mouth once daily.    ondansetron (ZOFRAN-ODT) 4 MG TbDL Take 1 tablet (4 mg total) by mouth every 6 (six) hours as needed.    pantoprazole (PROTONIX) 40 MG tablet Take 1 tablet (40 mg total) by mouth once daily.    rosuvastatin (CRESTOR) 40 MG Tab Take 40 mg by mouth every evening.    sodium bicarbonate 650 MG tablet Take 1 tablet (650 mg total) by mouth 3 (three) times daily.    tirzepatide 7.5 mg/0.5 mL PnIj Inject 7.5 mg into the skin every Sunday.    blood sugar diagnostic Strp 100 each by Misc.(Non-Drug; Combo Route) route once daily.    diclofenac sodium (VOLTAREN) 1 % Gel Apply 2 g topically 4 (four) times daily.    docusate sodium (COLACE) 100 MG capsule Take 1 capsule (100 mg total) by mouth 2 (two) times daily.    polyethylene glycol (GLYCOLAX) 17 gram PwPk Take 17 " g by mouth once daily.    solifenacin (VESICARE) 10 MG tablet Take 5 mg by mouth once daily.    triamcinolone acetonide 0.1% (KENALOG) 0.1 % ointment Apply topically 2 (two) times daily.    TRUE METRIX GLUCOSE TEST STRIP Strp Inject 1 strip into the skin 3 (three) times daily.    TRUEPLUS LANCETS 30 gauge Misc Inject 1 lancet  into the skin 3 (three) times daily.       Review of patient's allergies indicates:   Allergen Reactions    Chlorhexidine gluconate Rash     Chlorhexidine/alcohol wipes. Rash and blistering.       Past Medical History:   Diagnosis Date    Chronic kidney disease, stage 3     Diabetes mellitus     Diabetes mellitus, type 2     Hematuria, unspecified     Hypercalcemia     Hypertension     Malnutrition of moderate degree 2020    Mixed hyperlipidemia 2022    Proteinuria     Renal cyst, left     UTI (urinary tract infection) 2020     Past Surgical History:   Procedure Laterality Date    ABDOMINAL SURGERY      CATHETERIZATION OF URETER Bilateral 2020    Procedure: CATHETERIZATION, URETER;  Surgeon: Kvng Cunningham MD;  Location: [address];  Service: Urology;  Laterality: Bilateral;     SECTION, CLASSIC      x2    CHOLECYSTECTOMY      CLOSURE, ILEOSTOMY, OPEN N/A 2021    Procedure: CLOSURE, ILEOSTOMY, OPEN, ERAS low;  Surgeon: Fabiana Valiente MD;  Location: [address];  Service: Colon and Rectal;  Laterality: N/A;    COLON SURGERY      [facility]    COLONOSCOPY N/A 2018    Procedure: COLONOSCOPY;  Surgeon: Gibran Aguayo MD;  Location: [facility];  Service: Endoscopy;  Laterality: N/A;    COLOSTOMY Left     CYSTOSCOPY N/A 2020    Procedure: CYSTOSCOPY;  Surgeon: Kvng Cunningham MD;  Location: [address];  Service: Urology;  Laterality: N/A;    CYSTOSCOPY WITH URETEROSCOPY, RETROGRADE PYELOGRAPHY, AND INSERTION OF STENT Left 2019    Procedure: CYSTOSCOPY, WITH RETROGRADE PYELOGRAM AND URETERAL STENT INSERTION with  placement of a muir cath;  Surgeon: Ulisses Horton MD;  Location: [facility] OR;  Service: General;  Laterality: Left;    FLEXIBLE SIGMOIDOSCOPY N/A 2/9/2021    Procedure: SIGMOIDOSCOPY, FLEXIBLE;  Surgeon: Fabiana Valiente MD;  Location: [facility] OR 2ND FLR;  Service: Colon and Rectal;  Laterality: N/A;    HYSTEROSCOPY WITH DILATION AND CURETTAGE OF UTERUS N/A 12/9/2020    Procedure: HYSTEROSCOPY, WITH DILATION AND CURETTAGE OF UTERUS (MYOSURE);  Surgeon: Caitlyn Feng MD;  Location: [facility] OR;  Service: OB/GYN;  Laterality: N/A;  Guido notified 11/30, EF  video    HYSTEROSCOPY WITH DILATION AND CURETTAGE OF UTERUS N/A 3/4/2022    Procedure: HYSTEROSCOPY, WITH DILATION AND CURETTAGE OF UTERUS;  Surgeon: Caitlyn Feng MD;  Location: [facility];  Service: OB/GYN;  Laterality: N/A;  Alexander notified CW 3/3  video confirmed    ILEOSTOMY  7/31/2020    Procedure: CREATION, ILEOSTOMY;  Surgeon: Fabiana Valiente MD;  Location: [facility] OR 2ND FLR;  Service: Colon and Rectal;;    INCISION AND DRAINAGE OF ABSCESS N/A 12/22/2019    Procedure: INCISION AND DRAINAGE, ABSCESS;  Surgeon: Ulisses Horton MD;  Location: [facility];  Service: General;  Laterality: N/A;    INCISION OF ABDOMINAL WALL N/A 8/10/2018    Procedure: INCISION, ABDOMINAL WALL;  Surgeon: Ulisses Horton MD;  Location: [facility] OR;  Service: General;  Laterality: N/A;    INCISIONAL HERNIA REPAIR Right 2010    LOW ANTERIOR RESECTION OF COLON N/A 7/31/2020    Procedure: RESECTION, COLON, LOW ANTERIOR;  Surgeon: Fabiana Valiente MD;  Location: [facility] OR 2ND FLR;  Service: Colon and Rectal;  Laterality: N/A;    LYSIS OF ADHESIONS  7/31/2020    Procedure: LYSIS, ADHESIONS;  Surgeon: Fabiana Valiente MD;  Location: [facility] OR 2ND FLR;  Service: Colon and Rectal;;    SURGICAL REMOVAL OF MASS OF LOWER EXTREMITY Bilateral 10/13/2023    Procedure: EXCISION, MASS ruthy ankle Malleolus;  Surgeon: Ulisses Horton MD;  Location: [facility];  Service: General;  Laterality:  Bilateral;     Family History       Problem Relation (Age of Onset)    Alcohol abuse Father    Diabetes Sister, Brother    Early death Grandchild    Heart disease Mother, Sister    Hyperlipidemia Mother, Sister    Hypertension Mother, Sister    Stroke Mother, Sister          Tobacco Use    Smoking status: Former     Current packs/day: 0.00     Types: Cigarettes     Quit date: 2000     Years since quittin.3    Smokeless tobacco: Never   Substance and Sexual Activity    Alcohol use: No    Drug use: No    Sexual activity: Not Currently     Review of Systems   Constitutional:  Negative for chills and fever.   Respiratory:  Negative for shortness of breath.    Cardiovascular:  Negative for chest pain.   Gastrointestinal:  Positive for abdominal pain, diarrhea, nausea and vomiting. Negative for abdominal distention.     Objective:     Vital Signs (Most Recent):  Temp: 97.8 °F (36.6 °C) (25 07)  Pulse: 74 (25 07)  Resp: 18 (25 0810)  BP: 129/80 (25 07)  SpO2: 97 % (25) Vital Signs (24h Range):  Temp:  [97.5 °F (36.4 °C)-97.9 °F (36.6 °C)] 97.8 °F (36.6 °C)  Pulse:  [72-82] 74  Resp:  [18] 18  SpO2:  [94 %-97 %] 97 %  BP: (123-186)/(75-99) 129/80     Weight: 78.8 kg (173 lb 11.6 oz)  Body mass index is 31.77 kg/m².     Physical Exam  Vitals reviewed.   Constitutional:       General: She is not in acute distress.  HENT:      Head: Normocephalic and atraumatic.   Cardiovascular:      Rate and Rhythm: Normal rate and regular rhythm.   Pulmonary:      Effort: Pulmonary effort is normal. No respiratory distress.   Abdominal:      General: There is no distension.      Palpations: Abdomen is soft.      Tenderness: There is abdominal tenderness (RLQ). There is no guarding or rebound.      Comments: Prior midline incision as well as prior colostomy and prior ileostomy site well healed. No palpable hernia defect   Skin:     General: Skin is warm and dry.   Neurological:      General:  No focal deficit present.      Mental Status: She is alert and oriented to person, place, and time.            I have reviewed all pertinent lab results within the past 24 hours.  CBC:   Recent Labs   Lab 04/16/25  0512   WBC 7.20   RBC 5.05   HGB 13.9   HCT 42.2      MCV 84   MCH 27.5   MCHC 32.9     CMP:   Recent Labs   Lab 04/16/25  0512   CALCIUM 9.4   ALBUMIN 3.3*      K 3.8   CO2 22*      BUN 21   CREATININE 1.8*   ALKPHOS 57   ALT 22   AST 41   BILITOT 0.5       Significant Diagnostics:  I have reviewed all pertinent imaging results/findings within the past 24 hours.

## 2025-04-16 NOTE — HPI
Azucena Morrison is a 74 year old female with a PMH of T2DM, HTN, CKD3, HLD, A fib on eliquis who was admitted to the hospital for abdominal pain on 4/15. 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 Her last surgery was in 2020. Since this time, she has had chronic abdominal pain that comes and goes. She presented yesterday with worsening RLQ pain with associated nausea, vomiting and diarrhea. She has been having these symptoms for about 4 days. She denies fevers or chills at home. She has not had blood in the stool.     No cardiac issues  On eliquis, last dose was PTA  Surgical history as noted above.     Labs without leukocytosis.   CT scan somewhat limited because it is non con but shows some inflammation of the small bowel in the RLQ. No clear appendicitis. She does appear to have some slow transit through the anastomosis but it does not appear obstructed.

## 2025-04-16 NOTE — PROGRESS NOTES
Pharmacist Renal Dose Adjustment Note    Azucena Morrison is a 74 y.o. female being treated with the medication Famotidine    Patient Data:    Vital Signs (Most Recent):  Temp: 97.8 °F (36.6 °C) (04/16/25 0741)  Pulse: 74 (04/16/25 0741)  Resp: 18 (04/16/25 0810)  BP: 129/80 (04/16/25 0741)  SpO2: 97 % (04/16/25 0741) Vital Signs (72h Range):  Temp:  [97.5 °F (36.4 °C)-97.9 °F (36.6 °C)]   Pulse:  [72-91]   Resp:  [18-24]   BP: (123-186)/(75-99)   SpO2:  [94 %-98 %]      Recent Labs   Lab 04/13/25  1645 04/15/25  1029 04/16/25  0512   CREATININE 1.7* 1.6* 1.8*     Serum creatinine: 1.8 mg/dL (H) 04/16/25 0512  Estimated creatinine clearance: 27.1 mL/min (A)    Medication:Famotidine dose: 20mg frequency q12h will be changed to medication:Famotidine dose:20mg frequency:q24h    Pharmacist's Name: Sophie Garcia  Pharmacist's Extension: [phone]

## 2025-04-16 NOTE — PLAN OF CARE
Inpatient Upgrade Note    Azucena Morrison has warranted treatment spanning two or more midnights of hospital level care for the management of Enteritis . She continues to require IV antibiotics, IV fluids, daily labs, further testing/imaging, monitoring of vital signs, IV pain medication, and further evaluation by consultants. Her condition is also complicated by the following comorbidities: T2DM, HTN, CKD3, HLD, A fib on eliquis .

## 2025-04-16 NOTE — PLAN OF CARE
VIRTUAL NURSE: Pt arrived to unit. Permission received to turn camera to view patient. VIP model explained; Patient informed this VN will be working with bedside nurse and the rest of the care team.      Admission questions completed.  Plan of care reviewed with patient.  Educated patient on VTE and fall risk. Safety precautions in place. Call light within reach, side rails up x2.     Patient instucted to ask staff for assistance. Patient verbalized complete understanding.  Will continue to be available and intervene as needed.    Labs, notes, orders, and careplan reviewed.     Problem: Adult Inpatient Plan of Care  Goal: Plan of Care Review  Outcome: Progressing  Goal: Patient-Specific Goal (Individualized)  Outcome: Progressing        04/15/25 1955      Offer of free  was accepted or rejected? accepted   Interpreted items include ADLs;Nurse rounding;Patient education activities  (Admission profile)    service used Video Remote   's ID # [id number]  (Fab (Belarusian))        04/15/25 2031   Admission Complete   Admission Complete by VN Complete        04/15/25 2025   Patient Request   Patient Requested Pt c/o 8/10 abdominal pain. She would like Melatonin tonight. Son at BS is asking that they informed about everything.   Nurse Notification   Bedside Nurse Notified? Yes   Name of Bedside Nurse Bryan   Nurse Notfication Method Secure Chat   Nurse Notified Of Other  (Admission Profile is completed)   Admission   Initial VN Admission Questions Complete   Communication Issues? None   Shift   Virtual Nurse - Rounding Complete   Pain Management Interventions pain management plan reviewed with patient/caregiver   Virtual Nurse - Patient Verbalized Approval Of Camera Use;VN Rounding   Type of Frequent Check   Type Patient Rounds;Telemetry Monitoring   Safety/Activity   Patient Rounds bed in low position;placement of personal items at bedside;call light in patient/parent  reach;clutter free environment maintained;visualized patient   Safety Promotion/Fall Prevention assistive device/personal item within reach;high risk medications identified;medications reviewed;lighting adjusted;instructed to call staff for mobility;side rails raised x 2;patient expresses understanding of fall risk and prevention;Fall Risk reviewed with patient/family   Positioning   Body Position position changed independently;neutral body alignment;neutral head position   Head of Bed (HOB) Positioning HOB at 45 degrees   Pain/Comfort/Sleep   Preferred Pain Scale number (Numeric Rating Pain Scale)   Comfort/Acceptable Pain Level 0   Pain Body Location - Orientation generalized   Pain Body Location abdomen   Pain Rating (0-10): Rest 8   Pain Rating (0-10): Activity 8   Frequency constant   Quality aching   POSS ([place]ero Opioid-Induced Sed Scale) 1 - Awake and alert   Sleep/Rest/Relaxation awake

## 2025-04-16 NOTE — ASSESSMENT & PLAN NOTE
Creatine stable for now. BMP reviewed- noted Estimated Creatinine Clearance: 27.1 mL/min (A) (based on SCr of 1.8 mg/dL (H)). according to latest data. Based on current GFR, CKD stage is stage 3 - GFR 30-59.  Monitor UOP and serial BMP and adjust therapy as needed. Renally dose meds. Avoid nephrotoxic medications and procedures.    04/16:  -Creatinine 1.8  -Trend CMP daily    04/17:   -Stable; Creatinine 1.8  -Trend CMP daily

## 2025-04-16 NOTE — ASSESSMENT & PLAN NOTE
Continue home Statin   Restart home Lofibra and and Vascepa     04/16:  -Hold all agents for now until evaluated by general surgery    04/17:  -Oral agents remain on hold for bowel rest

## 2025-04-16 NOTE — PLAN OF CARE
MOON Message     Medicare Outpatient and Observation Notification regarding financial responsibility Other (comments)Medicare Outpatient and Observation Notification regarding financial responsibility. Other (comments). Has comment. Taken on 4/16/25 0867   Date JORDAN was signed 4/16/2025   Time JORDAN was signed [id number]

## 2025-04-16 NOTE — ASSESSMENT & PLAN NOTE
74 year old female with a PMH of T2DM, HTN, CKD3, HLD, A fib on eliquis who was admitted to the hospital for abdominal pain on 4/15. She has an extensive history of abdominal surgeries.     CT scan without evidence of surgical pathology. Likely has slow transit through anastomosis. Wall thickened could be chronic or related to an enteritis which is likely in the setting of diarrhea.  No evidence of appendicitis or obstruction.    No acute surgical intervention at this time.  Recommend supportive care

## 2025-04-17 LAB
POCT GLUCOSE: 111 MG/DL (ref 70–110)
POCT GLUCOSE: 125 MG/DL (ref 70–110)
POCT GLUCOSE: 95 MG/DL (ref 70–110)

## 2025-04-17 PROCEDURE — 25000003 PHARM REV CODE 250: Performed by: NURSE PRACTITIONER

## 2025-04-17 PROCEDURE — S5010 5% DEXTROSE AND 0.45% SALINE: HCPCS | Performed by: NURSE PRACTITIONER

## 2025-04-17 PROCEDURE — 11000001 HC ACUTE MED/SURG PRIVATE ROOM

## 2025-04-17 PROCEDURE — 63600175 PHARM REV CODE 636 W HCPCS: Performed by: NURSE PRACTITIONER

## 2025-04-17 PROCEDURE — 94761 N-INVAS EAR/PLS OXIMETRY MLT: CPT

## 2025-04-17 PROCEDURE — 99900035 HC TECH TIME PER 15 MIN (STAT)

## 2025-04-17 RX ADMIN — PIPERACILLIN AND TAZOBACTAM 4.5 G: 4; .5 INJECTION, POWDER, LYOPHILIZED, FOR SOLUTION INTRAVENOUS; PARENTERAL at 05:04

## 2025-04-17 RX ADMIN — PIPERACILLIN AND TAZOBACTAM 4.5 G: 4; .5 INJECTION, POWDER, LYOPHILIZED, FOR SOLUTION INTRAVENOUS; PARENTERAL at 02:04

## 2025-04-17 RX ADMIN — INSULIN GLARGINE 10 UNITS: 100 INJECTION, SOLUTION SUBCUTANEOUS at 08:04

## 2025-04-17 RX ADMIN — MORPHINE SULFATE 1 MG: 2 INJECTION, SOLUTION INTRAMUSCULAR; INTRAVENOUS at 08:04

## 2025-04-17 RX ADMIN — FAMOTIDINE 20 MG: 10 INJECTION, SOLUTION INTRAVENOUS at 08:04

## 2025-04-17 RX ADMIN — PIPERACILLIN AND TAZOBACTAM 4.5 G: 4; .5 INJECTION, POWDER, LYOPHILIZED, FOR SOLUTION INTRAVENOUS; PARENTERAL at 10:04

## 2025-04-17 RX ADMIN — DEXTROSE AND SODIUM CHLORIDE: 5; 450 INJECTION, SOLUTION INTRAVENOUS at 10:04

## 2025-04-17 RX ADMIN — INSULIN GLARGINE 10 UNITS: 100 INJECTION, SOLUTION SUBCUTANEOUS at 09:04

## 2025-04-17 NOTE — ASSESSMENT & PLAN NOTE
Patient's blood pressure range in the last 24 hours was: BP  Min: 114/71  Max: 138/78.The patient's inpatient anti-hypertensive regimen is listed below:  Current Antihypertensives  hydrALAZINE injection 5 mg, Every 6 hours PRN, Intravenous    Plan  - BP is controlled, no changes needed to their regimen  - Monitor     04/17:  -Oral agents held; as needed hydralazine every 6 hours

## 2025-04-17 NOTE — PLAN OF CARE
0900  CM informed Dr Horton's (gen surg) office that the previously scheduled appt today at 1400 will need to be rescheduled due to the pt's hospitalization. Awaiting return call with appt date & time.     0905  Gen surg appt rescheduled for 4/24/2025 at 1300. Information added to the pt's discharge paperwork.     0914  CM informed the pt, with the assistance of Urdu speaking  River (#[id number]), that  Stefanie is trying to contact her to schedule a hospfu appt with Dr Yolanda Guzmán. Pt verbalized understanding.      04/17/25 1030   Rounds   Attendance Provider;Nurse    Discharge Plan A Home with family   Why the patient remains in the hospital Requires continued medical care     1030  CM was informed by TYRELL Stahl that the pt is not medically stable to discharge due to pt's continued abdominal tenderness & pending gen surg recs.       Will continue to follow.

## 2025-04-17 NOTE — PLAN OF CARE
Problem: Adult Inpatient Plan of Care  Goal: Plan of Care Review  Outcome: Progressing  Goal: Patient-Specific Goal (Individualized)  Outcome: Progressing  Goal: Absence of Hospital-Acquired Illness or Injury  Outcome: Progressing  Goal: Optimal Comfort and Wellbeing  Outcome: Progressing     Problem: Diabetes Comorbidity  Goal: Blood Glucose Level Within Targeted Range  Outcome: Progressing     Problem: Pain Acute  Goal: Optimal Pain Control and Function  Outcome: Progressing     Patient continues to be NPO.  She rested in bed most of the day.  Was up in chair for about 4 hours.

## 2025-04-17 NOTE — PLAN OF CARE
Problem: Adult Inpatient Plan of Care  Goal: Plan of Care Review  Outcome: Progressing  Goal: Patient-Specific Goal (Individualized)  Outcome: Progressing  Goal: Absence of Hospital-Acquired Illness or Injury  Outcome: Progressing  Goal: Optimal Comfort and Wellbeing  Outcome: Progressing  Goal: Readiness for Transition of Care  Outcome: Progressing     Problem: Diabetes Comorbidity  Goal: Blood Glucose Level Within Targeted Range  Outcome: Progressing     Problem: Acute Kidney Injury/Impairment  Goal: Fluid and Electrolyte Balance  Outcome: Progressing  Goal: Improved Oral Intake  Outcome: Progressing  Goal: Effective Renal Function  Outcome: Progressing     Problem: Pain Acute  Goal: Optimal Pain Control and Function  Outcome: Progressing     Problem: Intestinal Obstruction  Goal: Optimal Bowel Function  Outcome: Progressing  Goal: Fluid and Electrolyte Balance  Outcome: Progressing  Goal: Absence of Infection Signs and Symptoms  Outcome: Progressing  Goal: Optimize Nutrition Status  Outcome: Progressing  Goal: Optimal Pain Control and Function  Outcome: Progressing     Problem: Fall Injury Risk  Goal: Absence of Fall and Fall-Related Injury  Outcome: Progressing

## 2025-04-17 NOTE — PROGRESS NOTES
[facility] Medicine  Progress Note    Patient Name: Azucena Morrison  MRN: [id number]  Patient Class: IP- Inpatient   Admission Date: 4/15/2025  Length of Stay: 1 days  Attending Physician: Kathrine De Santiago MD  Primary Care Provider: Pj Sanches MD        Subjective     Principal Problem:Enteritis        HPI:  Ms Morrison is a 74 year old female who present to Emergnecy Room for evaluation of abdominal. She has PMHx of A Fib on Eliquis, colon surgery, DM, chronic kidney disease stage 3, HLD. Associated symptoms include vomiting and started about five days ago. She denies fever, chills, shortness of breath. Patient was seen in the Emergency Room two days ago with similar complaints, CT of done and she was discharge home. CT of the abdomen today showed moderate length of distal small bowel wall thickening with adjoining inflammatory changes, appearing new/progressed when compared to recent CT of 04/13/2025, mild small-bowel in this region without evidence of toby bowel obstruction. Differential include infectious or noninfectious inflammatory enteritis, developing mechanical small bowel obstruction would be difficult to exclude. She is a full code and place in Observation under the care of [facility] Medicine     Overview/Hospital Course:  No notes on file    Interval History: Patient seen and examined; chart reviewed.  Interpretation services provided by [facility] ([id number]).  Reports diffuse right-sided abdominal pain.  Evaluated by General surgery on yesterday.  No immediate intervention at this time    Review of Systems   Constitutional:  Positive for appetite change and fatigue.   HENT: Negative.     Eyes: Negative.    Respiratory: Negative.     Cardiovascular: Negative.    Gastrointestinal:  Positive for abdominal distention and abdominal pain.   Endocrine: Negative.    Genitourinary: Negative.    Musculoskeletal: Negative.    Skin: Negative.    Allergic/Immunologic: Negative.     Neurological: Negative.    Hematological: Negative.    Psychiatric/Behavioral: Negative.       Objective:     Vital Signs (Most Recent):  Temp: 97.8 °F (36.6 °C) (04/16/25 0741)  Pulse: 74 (04/16/25 0741)  Resp: 18 (04/16/25 0810)  BP: 129/80 (04/16/25 0741)  SpO2: 97 % (04/16/25 0741) Vital Signs (24h Range):  Temp:  [97.5 °F (36.4 °C)-97.9 °F (36.6 °C)] 97.8 °F (36.6 °C)  Pulse:  [72-82] 74  Resp:  [18] 18  SpO2:  [94 %-97 %] 97 %  BP: (123-186)/(75-99) 129/80     Weight: 78.8 kg (173 lb 11.6 oz)  Body mass index is 31.77 kg/m².  No intake or output data in the 24 hours ending 04/16/25 1223      Physical Exam  Constitutional:       Appearance: She is ill-appearing.   HENT:      Head: Normocephalic and atraumatic.      Nose: Nose normal.      Mouth/Throat:      Mouth: Mucous membranes are dry.      Pharynx: Oropharynx is clear.   Eyes:      Extraocular Movements: Extraocular movements intact.      Conjunctiva/sclera: Conjunctivae normal.      Pupils: Pupils are equal, round, and reactive to light.   Cardiovascular:      Rate and Rhythm: Normal rate.      Pulses: Normal pulses.      Heart sounds: Normal heart sounds.   Pulmonary:      Effort: Pulmonary effort is normal.      Breath sounds: Normal breath sounds.   Abdominal:      General: There is distension.      Palpations: Abdomen is soft.      Tenderness: There is abdominal tenderness. There is guarding.      Comments: Bowel sounds faint   Musculoskeletal:         General: Normal range of motion.      Cervical back: Normal range of motion and neck supple.   Skin:     General: Skin is warm and dry.      Capillary Refill: Capillary refill takes less than 2 seconds.   Neurological:      Mental Status: She is alert and oriented to person, place, and time.   Psychiatric:         Mood and Affect: Mood normal.         Behavior: Behavior normal.         Thought Content: Thought content normal.         Judgment: Judgment normal.               Significant Labs: All  pertinent labs within the past 24 hours have been reviewed.    Significant Imaging: I have reviewed all pertinent imaging results/findings within the past 24 hours.  I have reviewed and interpreted all pertinent imaging results/findings within the past 24 hours.      Assessment & Plan  Generalized abdominal pain  Possible bowl obstruction vs infection/noninfectious process    Will start antibiotic for now   She has history of abdominal surgeries   Consults General Surgery for further evaluation and treatment   Bowl rest   IVF's       04/16:  -Continue bowel rest  -Start gentle fluids; Dextrose 5% 1/2 Normal Saline 50 ml/hr  -General surgery consult pending    04/17:  -Diffuse tenderness remains  -Evaluated by General surgery yesterday; conservative managed for now  -Continue bowel rest and gentle IV fluids  History of hemicolectomy  -Possible bowl obstruction vs infection/noninfectious process  -Consult General Surgery for further evaluation and treatment    04/16:  -Complete bowel rest  -Start gentle fluids    04/17:  -Evaluated by General surgery yesterday; appreciate the input  -Continue bowel rest and gentle fluids    Type 2 diabetes mellitus without complication, with long-term current use of insulin  Accu checks every 6 hour with SSI   Continue Lantus at half dose     04/16:  -Blood glucose levels reviewed  -D/W pharmacy; plan to discontinue basal insulin for now  -Continue low dose correction    04/17:  -Basal insulin dose discontinued on yesterday  -Continue Accu-Cheks with regular insulin correction      Essential hypertension  Patient's blood pressure range in the last 24 hours was: BP  Min: 114/71  Max: 138/78.The patient's inpatient anti-hypertensive regimen is listed below:  Current Antihypertensives  hydrALAZINE injection 5 mg, Every 6 hours PRN, Intravenous    Plan  - BP is controlled, no changes needed to their regimen  - Monitor     04/17:  -Oral agents held; as needed hydralazine every 6  hours  Stage 3a chronic kidney disease  Creatine stable for now. BMP reviewed- noted Estimated Creatinine Clearance: 27.1 mL/min (A) (based on SCr of 1.8 mg/dL (H)). according to latest data. Based on current GFR, CKD stage is stage 3 - GFR 30-59.  Monitor UOP and serial BMP and adjust therapy as needed. Renally dose meds. Avoid nephrotoxic medications and procedures.    04/16:  -Creatinine 1.8  -Trend CMP daily    04/17:   -Stable; Creatinine 1.8  -Trend CMP daily  Mixed hyperlipidemia  Continue home Statin   Restart home Lofibra and and Vascepa     04/16:  -Hold all agents for now until evaluated by general surgery    04/17:  -Oral agents remain on hold for bowel rest  A-fib  Patient has persistent (7 days or more) atrial fibrillation. Patient is currently in atrial fibrillation. [id number] Score: 3. The patients heart rate in the last 24 hours is as follows:  Pulse  Min: 60  Max: 79     Antiarrhythmics       Anticoagulants       Plan  - Replete lytes with a goal of K>4, Mg >2  - Patient is anticoagulated, see medications listed above.  - Patient's afib is currently controlled  - Will hold Eliquis until general surgery evaluation.     04/16:  -Continuous cardiac monitoring  -Eliquis on hold since admission    04/17:  -Eliquis remains on hold  -Remains in sinus rhythm; continuous cardiac monitoring  VTE Risk Mitigation (From admission, onward)           Ordered     IP VTE HIGH RISK PATIENT  Once         04/15/25 1505     Place sequential compression device  Until discontinued         04/15/25 1505                    Discharge Planning   LELE: 4/17/2025     Code Status: Full Code   Medical Readiness for Discharge Date:   Discharge Plan A: Home with family                        Brenda Stahl NP  Department of Hospital Medicine   [place] - Telemetry

## 2025-04-18 LAB
ABSOLUTE EOSINOPHIL (OHS): 0.1 K/UL
ABSOLUTE MONOCYTE (OHS): 0.32 K/UL (ref 0.3–1)
ABSOLUTE NEUTROPHIL COUNT (OHS): 4.24 K/UL (ref 1.8–7.7)
ALBUMIN SERPL BCP-MCNC: 3.4 G/DL (ref 3.5–5.2)
ALP SERPL-CCNC: 53 UNIT/L (ref 40–150)
ALT SERPL W/O P-5'-P-CCNC: 18 UNIT/L (ref 10–44)
ANION GAP (OHS): 10 MMOL/L (ref 8–16)
AST SERPL-CCNC: 26 UNIT/L (ref 11–45)
BASOPHILS # BLD AUTO: 0.03 K/UL
BASOPHILS NFR BLD AUTO: 0.5 %
BILIRUB SERPL-MCNC: 0.6 MG/DL (ref 0.1–1)
BUN SERPL-MCNC: 14 MG/DL (ref 8–23)
CALCIUM SERPL-MCNC: 9.6 MG/DL (ref 8.7–10.5)
CHLORIDE SERPL-SCNC: 104 MMOL/L (ref 95–110)
CO2 SERPL-SCNC: 26 MMOL/L (ref 23–29)
CREAT SERPL-MCNC: 1.5 MG/DL (ref 0.5–1.4)
ERYTHROCYTE [DISTWIDTH] IN BLOOD BY AUTOMATED COUNT: 13.7 % (ref 11.5–14.5)
GFR SERPLBLD CREATININE-BSD FMLA CKD-EPI: 37 ML/MIN/1.73/M2
GLUCOSE SERPL-MCNC: 116 MG/DL (ref 70–110)
HCT VFR BLD AUTO: 45.2 % (ref 37–48.5)
HGB BLD-MCNC: 14.9 GM/DL (ref 12–16)
IMM GRANULOCYTES # BLD AUTO: 0.02 K/UL (ref 0–0.04)
IMM GRANULOCYTES NFR BLD AUTO: 0.3 % (ref 0–0.5)
LACTATE SERPL-SCNC: 1.3 MMOL/L (ref 0.5–2.2)
LYMPHOCYTES # BLD AUTO: 1.48 K/UL (ref 1–4.8)
MCH RBC QN AUTO: 27.2 PG (ref 27–31)
MCHC RBC AUTO-ENTMCNC: 33 G/DL (ref 32–36)
MCV RBC AUTO: 83 FL (ref 82–98)
NUCLEATED RBC (/100WBC) (OHS): 0 /100 WBC
PLATELET # BLD AUTO: 274 K/UL (ref 150–450)
PMV BLD AUTO: 8.8 FL (ref 9.2–12.9)
POCT GLUCOSE: 103 MG/DL (ref 70–110)
POCT GLUCOSE: 114 MG/DL (ref 70–110)
POCT GLUCOSE: 128 MG/DL (ref 70–110)
POCT GLUCOSE: 234 MG/DL (ref 70–110)
POTASSIUM SERPL-SCNC: 3.8 MMOL/L (ref 3.5–5.1)
PROT SERPL-MCNC: 8 GM/DL (ref 6–8.4)
RBC # BLD AUTO: 5.48 M/UL (ref 4–5.4)
RELATIVE EOSINOPHIL (OHS): 1.6 %
RELATIVE LYMPHOCYTE (OHS): 23.9 % (ref 18–48)
RELATIVE MONOCYTE (OHS): 5.2 % (ref 4–15)
RELATIVE NEUTROPHIL (OHS): 68.5 % (ref 38–73)
SODIUM SERPL-SCNC: 140 MMOL/L (ref 136–145)
WBC # BLD AUTO: 6.19 K/UL (ref 3.9–12.7)

## 2025-04-18 PROCEDURE — 99900035 HC TECH TIME PER 15 MIN (STAT)

## 2025-04-18 PROCEDURE — 11000001 HC ACUTE MED/SURG PRIVATE ROOM

## 2025-04-18 PROCEDURE — 83605 ASSAY OF LACTIC ACID: CPT | Performed by: INTERNAL MEDICINE

## 2025-04-18 PROCEDURE — 25000003 PHARM REV CODE 250: Performed by: NURSE PRACTITIONER

## 2025-04-18 PROCEDURE — 63600175 PHARM REV CODE 636 W HCPCS: Performed by: NURSE PRACTITIONER

## 2025-04-18 PROCEDURE — 94761 N-INVAS EAR/PLS OXIMETRY MLT: CPT

## 2025-04-18 PROCEDURE — S5010 5% DEXTROSE AND 0.45% SALINE: HCPCS | Performed by: NURSE PRACTITIONER

## 2025-04-18 PROCEDURE — 82040 ASSAY OF SERUM ALBUMIN: CPT | Performed by: INTERNAL MEDICINE

## 2025-04-18 PROCEDURE — 36415 COLL VENOUS BLD VENIPUNCTURE: CPT | Performed by: INTERNAL MEDICINE

## 2025-04-18 PROCEDURE — 25000003 PHARM REV CODE 250: Performed by: INTERNAL MEDICINE

## 2025-04-18 PROCEDURE — 85025 COMPLETE CBC W/AUTO DIFF WBC: CPT | Performed by: INTERNAL MEDICINE

## 2025-04-18 RX ORDER — ACETAMINOPHEN 325 MG/1
650 TABLET ORAL EVERY 6 HOURS PRN
Status: DISCONTINUED | OUTPATIENT
Start: 2025-04-18 | End: 2025-04-20 | Stop reason: HOSPADM

## 2025-04-18 RX ORDER — CIPROFLOXACIN 500 MG/1
500 TABLET ORAL EVERY 12 HOURS
Status: COMPLETED | OUTPATIENT
Start: 2025-04-18 | End: 2025-04-20

## 2025-04-18 RX ORDER — METRONIDAZOLE 500 MG/1
500 TABLET ORAL EVERY 8 HOURS
Status: COMPLETED | OUTPATIENT
Start: 2025-04-18 | End: 2025-04-20

## 2025-04-18 RX ADMIN — DEXTROSE AND SODIUM CHLORIDE: 5; 450 INJECTION, SOLUTION INTRAVENOUS at 02:04

## 2025-04-18 RX ADMIN — MORPHINE SULFATE 1 MG: 2 INJECTION, SOLUTION INTRAMUSCULAR; INTRAVENOUS at 11:04

## 2025-04-18 RX ADMIN — INSULIN GLARGINE 10 UNITS: 100 INJECTION, SOLUTION SUBCUTANEOUS at 08:04

## 2025-04-18 RX ADMIN — MORPHINE SULFATE 1 MG: 2 INJECTION, SOLUTION INTRAMUSCULAR; INTRAVENOUS at 09:04

## 2025-04-18 RX ADMIN — METRONIDAZOLE 500 MG: 500 TABLET ORAL at 02:04

## 2025-04-18 RX ADMIN — ACETAMINOPHEN 650 MG: 325 TABLET ORAL at 08:04

## 2025-04-18 RX ADMIN — ONDANSETRON 4 MG: 4 TABLET, ORALLY DISINTEGRATING ORAL at 02:04

## 2025-04-18 RX ADMIN — INSULIN ASPART 2 UNITS: 100 INJECTION, SOLUTION INTRAVENOUS; SUBCUTANEOUS at 04:04

## 2025-04-18 RX ADMIN — PIPERACILLIN AND TAZOBACTAM 4.5 G: 4; .5 INJECTION, POWDER, LYOPHILIZED, FOR SOLUTION INTRAVENOUS; PARENTERAL at 09:04

## 2025-04-18 RX ADMIN — CIPROFLOXACIN 500 MG: 500 TABLET ORAL at 08:04

## 2025-04-18 RX ADMIN — FAMOTIDINE 20 MG: 10 INJECTION, SOLUTION INTRAVENOUS at 08:04

## 2025-04-18 RX ADMIN — PIPERACILLIN AND TAZOBACTAM 4.5 G: 4; .5 INJECTION, POWDER, LYOPHILIZED, FOR SOLUTION INTRAVENOUS; PARENTERAL at 02:04

## 2025-04-18 RX ADMIN — METRONIDAZOLE 500 MG: 500 TABLET ORAL at 09:04

## 2025-04-18 NOTE — PLAN OF CARE
Problem: Adult Inpatient Plan of Care  Goal: Plan of Care Review  Outcome: Progressing  Goal: Absence of Hospital-Acquired Illness or Injury  Outcome: Progressing  Goal: Readiness for Transition of Care  Outcome: Progressing     Problem: Acute Kidney Injury/Impairment  Goal: Fluid and Electrolyte Balance  Outcome: Progressing   Plan of care reviewed with patient. Questions answered. All safety measure implemented during shift.

## 2025-04-18 NOTE — ASSESSMENT & PLAN NOTE
-Possible bowl obstruction vs infection/noninfectious process  -Consult General Surgery for further evaluation and treatment    04/16:  -Complete bowel rest  -Start gentle fluids    04/17:  -Evaluated by General surgery yesterday; appreciate the input  -Continue bowel rest and gentle fluids    04/18  - abdominal pain resolved.  - agreeable to initiate diet today  - Will switch to oral antibiotics today after last dose of zosyn.  - this is Zosyn.  Initiate ciprofloxacin and metronidazole to complete 5 day course.

## 2025-04-18 NOTE — ASSESSMENT & PLAN NOTE
Creatine stable for now. BMP reviewed- noted Estimated Creatinine Clearance: 32.5 mL/min (A) (based on SCr of 1.5 mg/dL (H)). according to latest data. Based on current GFR, CKD stage is stage 3 - GFR 30-59.  Monitor UOP and serial BMP and adjust therapy as needed. Renally dose meds. Avoid nephrotoxic medications and procedures.    04/16:  -Creatinine 1.8  -Trend CMP daily    04/17:   -Stable; Creatinine 1.8  -Trend CMP daily

## 2025-04-18 NOTE — SUBJECTIVE & OBJECTIVE
Interval History:  Clinically continues to improve.  Denies abdominal pain at this time.  No nausea, or vomiting.  Had 2 BM today. Requests to start diet today. Will start soft diet today.  Spoke with daughter in-law and son at bedside.    We will discontinue IV antibiotics at this time.  Initiate ciprofloxacin and metronidazole to complete a 5 day course.  Potential plan to DC tomorrow.    Review of Systems   Eyes: Negative.    Respiratory: Negative.     Cardiovascular: Negative.    Gastrointestinal: Negative.    Genitourinary: Negative.    Musculoskeletal: Negative.      Objective:     Vital Signs (Most Recent):  Temp: 98 °F (36.7 °C) (04/18/25 1124)  Pulse: 70 (04/18/25 1124)  Resp: 18 (04/18/25 1124)  BP: (!) 156/85 (04/18/25 1124)  SpO2: 96 % (04/18/25 1124) Vital Signs (24h Range):  Temp:  [97.4 °F (36.3 °C)-98 °F (36.7 °C)] 98 °F (36.7 °C)  Pulse:  [60-74] 70  Resp:  [17-20] 18  SpO2:  [96 %-99 %] 96 %  BP: (117-156)/(69-99) 156/85     Weight: 78.8 kg (173 lb 11.6 oz)  Body mass index is 31.77 kg/m².  No intake or output data in the 24 hours ending 04/18/25 1259      Physical Exam  Vitals reviewed.   Constitutional:       General: She is not in acute distress.     Appearance: She is not toxic-appearing.   HENT:      Head: Normocephalic and atraumatic.   Cardiovascular:      Rate and Rhythm: Normal rate and regular rhythm.      Pulses: Normal pulses.   Pulmonary:      Effort: Pulmonary effort is normal. No respiratory distress.      Breath sounds: Normal breath sounds. No wheezing.   Abdominal:      General: Bowel sounds are normal. There is no distension.      Palpations: Abdomen is soft.      Tenderness: There is no abdominal tenderness.   Musculoskeletal:      Right lower leg: No edema.      Left lower leg: No edema.   Skin:     Coloration: Skin is not jaundiced or pale.   Neurological:      Mental Status: She is alert and oriented to person, place, and time.   Psychiatric:         Mood and Affect: Mood  normal.               Significant Labs: All pertinent labs within the past 24 hours have been reviewed.  CBC:   Recent Labs   Lab 04/18/25  1009   WBC 6.19   HGB 14.9   HCT 45.2        CMP:   Recent Labs   Lab 04/18/25  1009      K 3.8      CO2 26   BUN 14   CREATININE 1.5*   CALCIUM 9.6   ALBUMIN 3.4*   BILITOT 0.6   ALKPHOS 53   AST 26   ALT 18   ANIONGAP 10       Significant Imaging: I have reviewed all pertinent imaging results/findings within the past 24 hours.

## 2025-04-18 NOTE — ASSESSMENT & PLAN NOTE
Patient's blood pressure range in the last 24 hours was: BP  Min: 117/69  Max: 156/85.The patient's inpatient anti-hypertensive regimen is listed below:  Current Antihypertensives  hydrALAZINE injection 5 mg, Every 6 hours PRN, Intravenous    Plan  - BP is controlled, no changes needed to their regimen  - Monitor     04/17:  -Oral agents held; as needed hydralazine every 6 hours

## 2025-04-18 NOTE — PROGRESS NOTES
[facility] Medicine  Progress Note    Patient Name: Azucena Morrison  MRN: [id number]  Patient Class: IP- Inpatient   Admission Date: 4/15/2025  Length of Stay: 2 days  Attending Physician: Ernesto Hays MD  Primary Care Provider: Pj Sanches MD        Subjective     Principal Problem:Generalized abdominal pain        HPI:  Ms Morrison is a 74 year old female who present to Emergnecy Room for evaluation of abdominal. She has PMHx of A Fib on Eliquis, colon surgery, DM, chronic kidney disease stage 3, HLD. Associated symptoms include vomiting and started about five days ago. She denies fever, chills, shortness of breath. Patient was seen in the Emergency Room two days ago with similar complaints, CT of done and she was discharge home. CT of the abdomen today showed moderate length of distal small bowel wall thickening with adjoining inflammatory changes, appearing new/progressed when compared to recent CT of 04/13/2025, mild small-bowel in this region without evidence of toby bowel obstruction. Differential include infectious or noninfectious inflammatory enteritis, developing mechanical small bowel obstruction would be difficult to exclude. She is a full code and place in Observation under the care of Hospital Medicine     Overview/Hospital Course:  No notes on file    Interval History:  Clinically continues to improve.  Denies abdominal pain at this time.  No nausea, or vomiting.  Had 2 BM today. Requests to start diet today. Will start soft diet today.  Spoke with daughter in-law and son at bedside.    Labs reviewed without any evidence of laboratory impediment.    We will discontinue IV antibiotics at this time.  Initiate ciprofloxacin and metronidazole to complete a 5 day course.  Potential plan to DC tomorrow.    Review of Systems   Eyes: Negative.    Respiratory: Negative.     Cardiovascular: Negative.    Gastrointestinal: Negative.    Genitourinary: Negative.     Musculoskeletal: Negative.      Objective:     Vital Signs (Most Recent):  Temp: 98 °F (36.7 °C) (04/18/25 1124)  Pulse: 70 (04/18/25 1124)  Resp: 18 (04/18/25 1124)  BP: (!) 156/85 (04/18/25 1124)  SpO2: 96 % (04/18/25 1124) Vital Signs (24h Range):  Temp:  [97.4 °F (36.3 °C)-98 °F (36.7 °C)] 98 °F (36.7 °C)  Pulse:  [60-74] 70  Resp:  [17-20] 18  SpO2:  [96 %-99 %] 96 %  BP: (117-156)/(69-99) 156/85     Weight: 78.8 kg (173 lb 11.6 oz)  Body mass index is 31.77 kg/m².  No intake or output data in the 24 hours ending 04/18/25 1259      Physical Exam  Vitals reviewed.   Constitutional:       General: She is not in acute distress.     Appearance: She is not toxic-appearing.   HENT:      Head: Normocephalic and atraumatic.   Cardiovascular:      Rate and Rhythm: Normal rate and regular rhythm.      Pulses: Normal pulses.   Pulmonary:      Effort: Pulmonary effort is normal. No respiratory distress.      Breath sounds: Normal breath sounds. No wheezing.   Abdominal:      General: Bowel sounds are normal. There is no distension.      Palpations: Abdomen is soft.      Tenderness: There is no abdominal tenderness.   Musculoskeletal:      Right lower leg: No edema.      Left lower leg: No edema.   Skin:     Coloration: Skin is not jaundiced or pale.   Neurological:      Mental Status: She is alert and oriented to person, place, and time.   Psychiatric:         Mood and Affect: Mood normal.               Significant Labs: All pertinent labs within the past 24 hours have been reviewed.  CBC:   Recent Labs   Lab 04/18/25  1009   WBC 6.19   HGB 14.9   HCT 45.2        CMP:   Recent Labs   Lab 04/18/25  1009      K 3.8      CO2 26   BUN 14   CREATININE 1.5*   CALCIUM 9.6   ALBUMIN 3.4*   BILITOT 0.6   ALKPHOS 53   AST 26   ALT 18   ANIONGAP 10       Significant Imaging: I have reviewed all pertinent imaging results/findings within the past 24 hours.      Assessment & Plan  Generalized abdominal  pain  Possible bowl obstruction vs infection/noninfectious process    Will start antibiotic for now   She has history of abdominal surgeries   Consults General Surgery for further evaluation and treatment   Bowl rest   IVF's       04/16:  -Continue bowel rest  -Start gentle fluids; Dextrose 5% 1/2 Normal Saline 50 ml/hr  -General surgery consult pending    04/17:  -Diffuse tenderness remains  -Evaluated by General surgery yesterday; conservative managed for now  -Continue bowel rest and gentle IV fluids    04/18  - abdominal pain resolved.  - agreeable to initiate diet today  - Will switch to oral antibiotics today after last dose of zosyn.  - this is Zosyn.  Initiate ciprofloxacin and metronidazole to complete 5 day course.  History of hemicolectomy  -Possible bowl obstruction vs infection/noninfectious process  -Consult General Surgery for further evaluation and treatment    04/16:  -Complete bowel rest  -Start gentle fluids    04/17:  -Evaluated by General surgery yesterday; appreciate the input  -Continue bowel rest and gentle fluids    04/18  - abdominal pain resolved.  - agreeable to initiate diet today  - Will switch to oral antibiotics today after last dose of zosyn.  - this is Zosyn.  Initiate ciprofloxacin and metronidazole to complete 5 day course.    Type 2 diabetes mellitus without complication, with long-term current use of insulin  Accu checks every 6 hour with SSI   Continue Lantus at half dose     04/16:  -Blood glucose levels reviewed  -D/W pharmacy; plan to discontinue basal insulin for now  -Continue low dose correction    04/17:  -Basal insulin dose discontinued on yesterday  -Continue Accu-Cheks with regular insulin correction      Essential hypertension  Patient's blood pressure range in the last 24 hours was: BP  Min: 117/69  Max: 156/85.The patient's inpatient anti-hypertensive regimen is listed below:  Current Antihypertensives  hydrALAZINE injection 5 mg, Every 6 hours PRN,  Intravenous    Plan  - BP is controlled, no changes needed to their regimen  - Monitor     04/17:  -Oral agents held; as needed hydralazine every 6 hours  Stage 3a chronic kidney disease  Creatine stable for now. BMP reviewed- noted Estimated Creatinine Clearance: 32.5 mL/min (A) (based on SCr of 1.5 mg/dL (H)). according to latest data. Based on current GFR, CKD stage is stage 3 - GFR 30-59.  Monitor UOP and serial BMP and adjust therapy as needed. Renally dose meds. Avoid nephrotoxic medications and procedures.    04/16:  -Creatinine 1.8  -Trend CMP daily    04/17:   -Stable; Creatinine 1.8  -Trend CMP daily  Mixed hyperlipidemia  Continue home Statin   Restart home Lofibra and and Vascepa     04/16:  -Hold all agents for now until evaluated by general surgery    04/17:  -Oral agents remain on hold for bowel rest  A-fib  Patient has persistent (7 days or more) atrial fibrillation. Patient is currently in atrial fibrillation. [id number] Score: 3. The patients heart rate in the last 24 hours is as follows:  Pulse  Min: 60  Max: 74     Antiarrhythmics       Anticoagulants       Plan  - Replete lytes with a goal of K>4, Mg >2  - Patient is anticoagulated, see medications listed above.  - Patient's afib is currently controlled  - Will hold Eliquis until general surgery evaluation.     04/16:  -Continuous cardiac monitoring  -Eliquis on hold since admission    04/17:  -Eliquis remains on hold  -Remains in sinus rhythm; continuous cardiac monitoring  VTE Risk Mitigation (From admission, onward)           Ordered     IP VTE HIGH RISK PATIENT  Once         04/15/25 1505     Place sequential compression device  Until discontinued         04/15/25 1505                    Discharge Planning   LELE: 4/18/2025     Code Status: Full Code   Medical Readiness for Discharge Date:   Discharge Plan A: Home with family                        Ernesto Hays MD  Department of Hospital Medicine   [place] - Telemetry

## 2025-04-18 NOTE — ASSESSMENT & PLAN NOTE
Possible bowl obstruction vs infection/noninfectious process    Will start antibiotic for now   She has history of abdominal surgeries   Consults General Surgery for further evaluation and treatment   Bowl rest   IVF's       04/16:  -Continue bowel rest  -Start gentle fluids; Dextrose 5% 1/2 Normal Saline 50 ml/hr  -General surgery consult pending    04/17:  -Diffuse tenderness remains  -Evaluated by General surgery yesterday; conservative managed for now  -Continue bowel rest and gentle IV fluids    04/18  - abdominal pain resolved.  - agreeable to initiate diet today  - Will switch to oral antibiotics today after last dose of zosyn.  - this is Zosyn.  Initiate ciprofloxacin and metronidazole to complete 5 day course.

## 2025-04-18 NOTE — ASSESSMENT & PLAN NOTE
Patient has persistent (7 days or more) atrial fibrillation. Patient is currently in atrial fibrillation. [id number] Score: 3. The patients heart rate in the last 24 hours is as follows:  Pulse  Min: 60  Max: 74     Antiarrhythmics       Anticoagulants       Plan  - Replete lytes with a goal of K>4, Mg >2  - Patient is anticoagulated, see medications listed above.  - Patient's afib is currently controlled  - Will hold Eliquis until general surgery evaluation.     04/16:  -Continuous cardiac monitoring  -Eliquis on hold since admission    04/17:  -Eliquis remains on hold  -Remains in sinus rhythm; continuous cardiac monitoring

## 2025-04-19 LAB
ABSOLUTE EOSINOPHIL (OHS): 0.15 K/UL
ABSOLUTE MONOCYTE (OHS): 0.35 K/UL (ref 0.3–1)
ABSOLUTE NEUTROPHIL COUNT (OHS): 3.15 K/UL (ref 1.8–7.7)
ALBUMIN SERPL BCP-MCNC: 2.9 G/DL (ref 3.5–5.2)
ALP SERPL-CCNC: 48 UNIT/L (ref 40–150)
ALT SERPL W/O P-5'-P-CCNC: 14 UNIT/L (ref 10–44)
ANION GAP (OHS): 8 MMOL/L (ref 8–16)
AST SERPL-CCNC: 18 UNIT/L (ref 11–45)
BASOPHILS # BLD AUTO: 0.03 K/UL
BASOPHILS NFR BLD AUTO: 0.6 %
BILIRUB SERPL-MCNC: 0.2 MG/DL (ref 0.1–1)
BUN SERPL-MCNC: 17 MG/DL (ref 8–23)
CALCIUM SERPL-MCNC: 8.7 MG/DL (ref 8.7–10.5)
CHLORIDE SERPL-SCNC: 108 MMOL/L (ref 95–110)
CO2 SERPL-SCNC: 21 MMOL/L (ref 23–29)
CREAT SERPL-MCNC: 1.4 MG/DL (ref 0.5–1.4)
ERYTHROCYTE [DISTWIDTH] IN BLOOD BY AUTOMATED COUNT: 13.8 % (ref 11.5–14.5)
GFR SERPLBLD CREATININE-BSD FMLA CKD-EPI: 40 ML/MIN/1.73/M2
GLUCOSE SERPL-MCNC: 198 MG/DL (ref 70–110)
HCT VFR BLD AUTO: 40.5 % (ref 37–48.5)
HGB BLD-MCNC: 13.6 GM/DL (ref 12–16)
IMM GRANULOCYTES # BLD AUTO: 0.01 K/UL (ref 0–0.04)
IMM GRANULOCYTES NFR BLD AUTO: 0.2 % (ref 0–0.5)
LYMPHOCYTES # BLD AUTO: 1.58 K/UL (ref 1–4.8)
MAGNESIUM SERPL-MCNC: 1.6 MG/DL (ref 1.6–2.6)
MCH RBC QN AUTO: 27.6 PG (ref 27–31)
MCHC RBC AUTO-ENTMCNC: 33.6 G/DL (ref 32–36)
MCV RBC AUTO: 82 FL (ref 82–98)
NUCLEATED RBC (/100WBC) (OHS): 0 /100 WBC
PLATELET # BLD AUTO: 249 K/UL (ref 150–450)
PMV BLD AUTO: 9 FL (ref 9.2–12.9)
POCT GLUCOSE: 138 MG/DL (ref 70–110)
POCT GLUCOSE: 162 MG/DL (ref 70–110)
POCT GLUCOSE: 166 MG/DL (ref 70–110)
POCT GLUCOSE: 167 MG/DL (ref 70–110)
POCT GLUCOSE: 195 MG/DL (ref 70–110)
POCT GLUCOSE: 199 MG/DL (ref 70–110)
POTASSIUM SERPL-SCNC: 3.2 MMOL/L (ref 3.5–5.1)
PROT SERPL-MCNC: 7 GM/DL (ref 6–8.4)
RBC # BLD AUTO: 4.93 M/UL (ref 4–5.4)
RELATIVE EOSINOPHIL (OHS): 2.8 %
RELATIVE LYMPHOCYTE (OHS): 30 % (ref 18–48)
RELATIVE MONOCYTE (OHS): 6.6 % (ref 4–15)
RELATIVE NEUTROPHIL (OHS): 59.8 % (ref 38–73)
SODIUM SERPL-SCNC: 137 MMOL/L (ref 136–145)
WBC # BLD AUTO: 5.27 K/UL (ref 3.9–12.7)

## 2025-04-19 PROCEDURE — 80053 COMPREHEN METABOLIC PANEL: CPT | Performed by: INTERNAL MEDICINE

## 2025-04-19 PROCEDURE — 99900035 HC TECH TIME PER 15 MIN (STAT)

## 2025-04-19 PROCEDURE — 63600175 PHARM REV CODE 636 W HCPCS: Performed by: NURSE PRACTITIONER

## 2025-04-19 PROCEDURE — 83735 ASSAY OF MAGNESIUM: CPT | Performed by: INTERNAL MEDICINE

## 2025-04-19 PROCEDURE — 85025 COMPLETE CBC W/AUTO DIFF WBC: CPT | Performed by: INTERNAL MEDICINE

## 2025-04-19 PROCEDURE — 94761 N-INVAS EAR/PLS OXIMETRY MLT: CPT

## 2025-04-19 PROCEDURE — 36415 COLL VENOUS BLD VENIPUNCTURE: CPT | Performed by: INTERNAL MEDICINE

## 2025-04-19 PROCEDURE — 25000003 PHARM REV CODE 250: Performed by: FAMILY MEDICINE

## 2025-04-19 PROCEDURE — 25000003 PHARM REV CODE 250: Performed by: NURSE PRACTITIONER

## 2025-04-19 PROCEDURE — 11000001 HC ACUTE MED/SURG PRIVATE ROOM

## 2025-04-19 PROCEDURE — 63600175 PHARM REV CODE 636 W HCPCS: Performed by: STUDENT IN AN ORGANIZED HEALTH CARE EDUCATION/TRAINING PROGRAM

## 2025-04-19 PROCEDURE — 25000003 PHARM REV CODE 250: Performed by: INTERNAL MEDICINE

## 2025-04-19 RX ORDER — CARVEDILOL 25 MG/1
25 TABLET ORAL 2 TIMES DAILY
Status: DISCONTINUED | OUTPATIENT
Start: 2025-04-19 | End: 2025-04-20 | Stop reason: HOSPADM

## 2025-04-19 RX ORDER — PROCHLORPERAZINE EDISYLATE 5 MG/ML
10 INJECTION INTRAMUSCULAR; INTRAVENOUS ONCE
Status: COMPLETED | OUTPATIENT
Start: 2025-04-19 | End: 2025-04-19

## 2025-04-19 RX ADMIN — ACETAMINOPHEN 650 MG: 325 TABLET ORAL at 05:04

## 2025-04-19 RX ADMIN — CARVEDILOL 25 MG: 25 TABLET, FILM COATED ORAL at 11:04

## 2025-04-19 RX ADMIN — MORPHINE SULFATE 1 MG: 2 INJECTION, SOLUTION INTRAMUSCULAR; INTRAVENOUS at 09:04

## 2025-04-19 RX ADMIN — ONDANSETRON 4 MG: 4 TABLET, ORALLY DISINTEGRATING ORAL at 07:04

## 2025-04-19 RX ADMIN — CIPROFLOXACIN 500 MG: 500 TABLET ORAL at 09:04

## 2025-04-19 RX ADMIN — CARVEDILOL 25 MG: 25 TABLET, FILM COATED ORAL at 09:04

## 2025-04-19 RX ADMIN — FAMOTIDINE 20 MG: 10 INJECTION, SOLUTION INTRAVENOUS at 09:04

## 2025-04-19 RX ADMIN — POTASSIUM BICARBONATE 20 MEQ: 391 TABLET, EFFERVESCENT ORAL at 11:04

## 2025-04-19 RX ADMIN — INSULIN GLARGINE 10 UNITS: 100 INJECTION, SOLUTION SUBCUTANEOUS at 09:04

## 2025-04-19 RX ADMIN — HYDRALAZINE HYDROCHLORIDE 5 MG: 20 INJECTION INTRAMUSCULAR; INTRAVENOUS at 05:04

## 2025-04-19 RX ADMIN — METRONIDAZOLE 500 MG: 500 TABLET ORAL at 09:04

## 2025-04-19 RX ADMIN — METRONIDAZOLE 500 MG: 500 TABLET ORAL at 06:04

## 2025-04-19 RX ADMIN — APIXABAN 5 MG: 5 TABLET, FILM COATED ORAL at 09:04

## 2025-04-19 RX ADMIN — APIXABAN 5 MG: 5 TABLET, FILM COATED ORAL at 11:04

## 2025-04-19 RX ADMIN — PROCHLORPERAZINE EDISYLATE 10 MG: 5 INJECTION INTRAMUSCULAR; INTRAVENOUS at 08:04

## 2025-04-19 RX ADMIN — METRONIDAZOLE 500 MG: 500 TABLET ORAL at 02:04

## 2025-04-19 NOTE — PROGRESS NOTES
[facility] Medicine  Progress Note    Patient Name: Azucena Morrison  MRN: [id number]  Patient Class: IP- Inpatient   Admission Date: 4/15/2025  Length of Stay: 3 days  Attending Physician: Amari Fraire MD  Primary Care Provider: Pj Sanches MD        Subjective:     Principal Problem:Generalized abdominal pain      HPI:  Ms Morrison is a 74 year old female who present to Emergnecy Room for evaluation of abdominal. She has PMHx of A Fib on Eliquis, colon surgery, DM, chronic kidney disease stage 3, HLD. Associated symptoms include vomiting and started about five days ago. She denies fever, chills, shortness of breath. Patient was seen in the Emergency Room two days ago with similar complaints, CT of done and she was discharge home. CT of the abdomen today showed moderate length of distal small bowel wall thickening with adjoining inflammatory changes, appearing new/progressed when compared to recent CT of 04/13/2025, mild small-bowel in this region without evidence of toby bowel obstruction. Differential include infectious or noninfectious inflammatory enteritis, developing mechanical small bowel obstruction would be difficult to exclude. She is a full code and place in Observation under the care of Hospital Medicine     Interval History: no acute event overnight. Labs and vitals reviewed. Continue to improve. Transitioned to PO abx. Hypokalemia, will replace K.    Review of Systems    Eyes: Negative.    Respiratory: Negative.     Cardiovascular: Negative.    Gastrointestinal: Negative.    Genitourinary: Negative.    Musculoskeletal: Negative.      Objective:     Vital Signs (Most Recent):  Temp: 98 °F (36.7 °C) (04/19/25 0749)  Pulse: 72 (04/19/25 0749)  Resp: 18 (04/19/25 0946)  BP: (!) 155/86 (04/19/25 0749)  SpO2: 95 % (04/19/25 0749) Vital Signs (24h Range):  Temp:  [97.4 °F (36.3 °C)-98.3 °F (36.8 °C)] 98 °F (36.7 °C)  Pulse:  [67-78] 72  Resp:  [17-18] 18  SpO2:  [95 %-98 %]  95 %  BP: (129-156)/() 155/86     Weight: 78.8 kg (173 lb 11.6 oz)  Body mass index is 31.77 kg/m².    Intake/Output Summary (Last 24 hours) at 4/19/2025 1007  Last data filed at 4/18/2025 1842  Gross per 24 hour   Intake 550 ml   Output --   Net 550 ml      Physical Exam  Constitutional:       General: She is not in acute distress.     Appearance: She is well-developed. She is not diaphoretic.   HENT:      Head: Normocephalic and atraumatic.      Right Ear: External ear normal.      Left Ear: External ear normal.      Nose: Nose normal.      Mouth/Throat:      Pharynx: No oropharyngeal exudate.   Eyes:      General: No scleral icterus.        Right eye: No discharge.         Left eye: No discharge.      Conjunctiva/sclera: Conjunctivae normal.      Pupils: Pupils are equal, round, and reactive to light.   Neck:      Thyroid: No thyromegaly.      Vascular: No JVD.      Trachea: No tracheal deviation.   Cardiovascular:      Rate and Rhythm: Normal rate and regular rhythm.      Heart sounds: Normal heart sounds. No murmur heard.     No friction rub. No gallop.   Pulmonary:      Effort: Pulmonary effort is normal.      Breath sounds: Normal breath sounds. No stridor. No wheezing or rales.   Chest:      Chest wall: No tenderness.   Abdominal:      General: Bowel sounds are normal. There is no distension.      Palpations: Abdomen is soft. There is no mass.      Tenderness: There is no abdominal tenderness. There is no guarding or rebound.      Hernia: No hernia is present.   Musculoskeletal:         General: No tenderness. Normal range of motion.      Cervical back: Normal range of motion and neck supple.   Lymphadenopathy:      Cervical: No cervical adenopathy.   Skin:     General: Skin is warm and dry.      Coloration: Skin is not pale.      Findings: No erythema or rash.   Neurological:      Mental Status: She is alert and oriented to person, place, and time.      Cranial Nerves: No cranial nerve deficit.       Motor: No abnormal muscle tone.      Coordination: Coordination normal.      Deep Tendon Reflexes: Reflexes are normal and symmetric. Reflexes normal.   Psychiatric:         Behavior: Behavior normal.         Thought Content: Thought content normal.         Judgment: Judgment normal.               Significant Labs: All pertinent labs within the past 24 hours have been reviewed.  CBC:   Recent Labs   Lab 04/18/25  1009 04/19/25  0540   WBC 6.19 5.27   HGB 14.9 13.6   HCT 45.2 40.5    249     CMP:   Recent Labs   Lab 04/18/25  1009 04/19/25  0540    137   K 3.8 3.2*    108   CO2 26 21*   BUN 14 17   CREATININE 1.5* 1.4   CALCIUM 9.6 8.7   ALBUMIN 3.4* 2.9*   BILITOT 0.6 0.2   ALKPHOS 53 48   AST 26 18   ALT 18 14   ANIONGAP 10 8       Significant Imaging: I have reviewed all pertinent imaging results/findings within the past 24 hours.  I have reviewed and interpreted all pertinent imaging results/findings within the past 24 hours.    Assessment/Plan:      Active Diagnoses:    Diagnosis Date Noted POA    PRINCIPAL PROBLEM:  Generalized abdominal pain [R10.84]  Yes     Chronic    A-fib [I48.91] 10/28/2024 Yes    Mixed hyperlipidemia [E78.2] 02/07/2022 Yes    Stage 3a chronic kidney disease [N18.31]  Yes    Essential hypertension [I10] 08/02/2016 Yes     Chronic    History of hemicolectomy [Z90.49] 05/17/2016 Not Applicable     Chronic    Type 2 diabetes mellitus without complication, with long-term current use of insulin [E11.9, Z79.4] 05/17/2016 Not Applicable     Chronic      Problems Resolved During this Admission:     VTE Risk Mitigation (From admission, onward)           Ordered     IP VTE HIGH RISK PATIENT  Once         04/15/25 1505     Place sequential compression device  Until discontinued         04/15/25 1505                  Generalized abdominal pain  Possible bowl obstruction vs infection/noninfectious process    Will start antibiotic for now   She has history of abdominal surgeries    Consults General Surgery for further evaluation and treatment   Started diet  continue ciprofloxacin and metronidazole to complete 5 day course.    History of hemicolectomy  -Possible bowl obstruction vs infection/noninfectious process  -Consult General Surgery for further evaluation and treatment   continue oral abx     Type 2 diabetes mellitus without complication, with long-term current use of insulin  Accu checks every 6 hour with SSI   Continue Lantus at half dose      04/16:  -Blood glucose levels reviewed  -D/W pharmacy; plan to discontinue basal insulin for now  -Continue low dose correction     04/17:  -Basal insulin dose discontinued on yesterday  -Continue Accu-Cheks with regular insulin correction        Essential hypertension  Restart home meds    Stage 3a chronic kidney disease  Creatine stable for now. BMP reviewed- noted Estimated Creatinine Clearance: 32.5 mL/min (A) (based on SCr of 1.5 mg/dL (H)). according to latest data. Based on current GFR, CKD stage is stage 3 - GFR 30-59.  Monitor UOP and serial BMP and adjust therapy as needed. Renally dose meds. Avoid nephrotoxic medications and procedures.     04/16:  -Creatinine 1.8  -Trend CMP daily     04/17:   -Stable; Creatinine 1.8  -Trend CMP daily    Mixed hyperlipidemia  Continue home Statin   Restart home Lofibra and and Vascepa        A-fib  Patient has persistent (7 days or more) atrial fibrillation. Patient is currently in atrial fibrillation. [id number] Score: 3. The patients heart rate in the last 24 hours is as follows:  Pulse  Min: 60  Max: 74      Antiarrhythmics     Anticoagulants     Plan  - Replete lytes with a goal of K>4, Mg >2  - Patient is anticoagulated, see medications listed above.  - Patient's afib is currently controlled  - Will hold Eliquis until general surgery evaluation.      04/16:  -Continuous cardiac monitoring  -Eliquis on hold since admission     04/17:  -Eliquis remains on hold  -Remains in sinus rhythm;  continuous cardiac monitoring    4/19 - will re start Eliquis and coreg    Amari Fraire MD  Department of Hospital Medicine   [place] - Telemetry

## 2025-04-20 VITALS
SYSTOLIC BLOOD PRESSURE: 125 MMHG | RESPIRATION RATE: 18 BRPM | WEIGHT: 173.75 LBS | TEMPERATURE: 98 F | HEIGHT: 62 IN | HEART RATE: 64 BPM | BODY MASS INDEX: 31.97 KG/M2 | DIASTOLIC BLOOD PRESSURE: 79 MMHG | OXYGEN SATURATION: 96 %

## 2025-04-20 LAB
ABSOLUTE EOSINOPHIL (OHS): 0.13 K/UL
ABSOLUTE MONOCYTE (OHS): 0.43 K/UL (ref 0.3–1)
ABSOLUTE NEUTROPHIL COUNT (OHS): 3.5 K/UL (ref 1.8–7.7)
ALBUMIN SERPL BCP-MCNC: 3 G/DL (ref 3.5–5.2)
ALP SERPL-CCNC: 51 UNIT/L (ref 40–150)
ALT SERPL W/O P-5'-P-CCNC: 16 UNIT/L (ref 10–44)
ANION GAP (OHS): 9 MMOL/L (ref 8–16)
AST SERPL-CCNC: 24 UNIT/L (ref 11–45)
BASOPHILS # BLD AUTO: 0.04 K/UL
BASOPHILS NFR BLD AUTO: 0.7 %
BILIRUB SERPL-MCNC: 0.2 MG/DL (ref 0.1–1)
BUN SERPL-MCNC: 21 MG/DL (ref 8–23)
CALCIUM SERPL-MCNC: 9.2 MG/DL (ref 8.7–10.5)
CHLORIDE SERPL-SCNC: 110 MMOL/L (ref 95–110)
CO2 SERPL-SCNC: 20 MMOL/L (ref 23–29)
CREAT SERPL-MCNC: 1.3 MG/DL (ref 0.5–1.4)
ERYTHROCYTE [DISTWIDTH] IN BLOOD BY AUTOMATED COUNT: 14 % (ref 11.5–14.5)
GFR SERPLBLD CREATININE-BSD FMLA CKD-EPI: 44 ML/MIN/1.73/M2
GLUCOSE SERPL-MCNC: 164 MG/DL (ref 70–110)
HCT VFR BLD AUTO: 41.6 % (ref 37–48.5)
HGB BLD-MCNC: 13.7 GM/DL (ref 12–16)
IMM GRANULOCYTES # BLD AUTO: 0.02 K/UL (ref 0–0.04)
IMM GRANULOCYTES NFR BLD AUTO: 0.4 % (ref 0–0.5)
LYMPHOCYTES # BLD AUTO: 1.49 K/UL (ref 1–4.8)
MAGNESIUM SERPL-MCNC: 1.5 MG/DL (ref 1.6–2.6)
MCH RBC QN AUTO: 27.3 PG (ref 27–31)
MCHC RBC AUTO-ENTMCNC: 32.9 G/DL (ref 32–36)
MCV RBC AUTO: 83 FL (ref 82–98)
NUCLEATED RBC (/100WBC) (OHS): 0 /100 WBC
PLATELET # BLD AUTO: 281 K/UL (ref 150–450)
PMV BLD AUTO: 9 FL (ref 9.2–12.9)
POCT GLUCOSE: 155 MG/DL (ref 70–110)
POTASSIUM SERPL-SCNC: 3.8 MMOL/L (ref 3.5–5.1)
PROT SERPL-MCNC: 7.1 GM/DL (ref 6–8.4)
RBC # BLD AUTO: 5.02 M/UL (ref 4–5.4)
RELATIVE EOSINOPHIL (OHS): 2.3 %
RELATIVE LYMPHOCYTE (OHS): 26.6 % (ref 18–48)
RELATIVE MONOCYTE (OHS): 7.7 % (ref 4–15)
RELATIVE NEUTROPHIL (OHS): 62.3 % (ref 38–73)
SODIUM SERPL-SCNC: 139 MMOL/L (ref 136–145)
WBC # BLD AUTO: 5.61 K/UL (ref 3.9–12.7)

## 2025-04-20 PROCEDURE — 36415 COLL VENOUS BLD VENIPUNCTURE: CPT | Performed by: INTERNAL MEDICINE

## 2025-04-20 PROCEDURE — 99900035 HC TECH TIME PER 15 MIN (STAT)

## 2025-04-20 PROCEDURE — 25000003 PHARM REV CODE 250: Performed by: INTERNAL MEDICINE

## 2025-04-20 PROCEDURE — 85025 COMPLETE CBC W/AUTO DIFF WBC: CPT | Performed by: INTERNAL MEDICINE

## 2025-04-20 PROCEDURE — 94761 N-INVAS EAR/PLS OXIMETRY MLT: CPT

## 2025-04-20 PROCEDURE — 83735 ASSAY OF MAGNESIUM: CPT | Performed by: INTERNAL MEDICINE

## 2025-04-20 PROCEDURE — 25000003 PHARM REV CODE 250: Performed by: FAMILY MEDICINE

## 2025-04-20 PROCEDURE — 80053 COMPREHEN METABOLIC PANEL: CPT | Performed by: INTERNAL MEDICINE

## 2025-04-20 PROCEDURE — 63600175 PHARM REV CODE 636 W HCPCS: Performed by: NURSE PRACTITIONER

## 2025-04-20 RX ADMIN — CARVEDILOL 25 MG: 25 TABLET, FILM COATED ORAL at 08:04

## 2025-04-20 RX ADMIN — INSULIN GLARGINE 10 UNITS: 100 INJECTION, SOLUTION SUBCUTANEOUS at 08:04

## 2025-04-20 RX ADMIN — APIXABAN 5 MG: 5 TABLET, FILM COATED ORAL at 08:04

## 2025-04-20 RX ADMIN — MORPHINE SULFATE 1 MG: 2 INJECTION, SOLUTION INTRAMUSCULAR; INTRAVENOUS at 09:04

## 2025-04-20 RX ADMIN — FAMOTIDINE 20 MG: 10 INJECTION, SOLUTION INTRAVENOUS at 08:04

## 2025-04-20 RX ADMIN — METRONIDAZOLE 500 MG: 500 TABLET ORAL at 06:04

## 2025-04-20 RX ADMIN — CIPROFLOXACIN 500 MG: 500 TABLET ORAL at 08:04

## 2025-04-20 NOTE — PLAN OF CARE
Pt is agreeable to discharge to home today. No HH or DME needs. Follow up appointments added for AVS. Pt will be transported home with her son Andry>    Future Appointments   Date Time Provider Department Center   4/21/2025  2:30 PM Yolanda Guzmán MD [facility]      04/20/25 0910   Final Note   Assessment Type Final Discharge Note   Anticipated Discharge Disposition Home   What phone number can be called within the next 1-3 days to see how you are doing after discharge? [phone]   Hospital Resources/Appts/Education Provided Appointments scheduled and added to AVS   Post-Acute Status   Discharge Delays None known at this time

## 2025-04-20 NOTE — DISCHARGE SUMMARY
[facility] Medicine  Discharge Summary      Patient Name: Azucena Morrison  MRN: [id number]  Admission Date: 4/15/2025  Hospital Length of Stay: 4 days  Discharge Date and Time: 04/20/2025 8:42 AM  Attending Physician: Amari Fraire MD   Discharging Provider: Amari Fraire MD  Discharge Provider Team: Networked reference to record PCT   Primary Care Provider: Pj Sanches MD      HPI:  Ms Morrison is a 74 year old female who present to Emergnecy Room for evaluation of abdominal. She has PMHx of A Fib on Eliquis, colon surgery, DM, chronic kidney disease stage 3, HLD. Associated symptoms include vomiting and started about five days ago. She denies fever, chills, shortness of breath. Patient was seen in the Emergency Room two days ago with similar complaints, CT of done and she was discharge home. CT of the abdomen today showed moderate length of distal small bowel wall thickening with adjoining inflammatory changes, appearing new/progressed when compared to recent CT of 04/13/2025, mild small-bowel in this region without evidence of toby bowel obstruction. Differential include infectious or noninfectious inflammatory enteritis, developing mechanical small bowel obstruction would be difficult to exclude. She is a full code and place in Observation under the care of Hospital Medicine       * No surgery found *      Hospital Course: she is admitted overnight for NPO< antibiotics and emesis control. General surgery consulted, who reviewed scans and decided no surgical intervention. She feels much better today and will follow PCP and surgery as outpatient. She will be discharging home.    Consults:   Consults (From admission, onward)          Status Ordering Provider     Inpatient consult to General Surgery  Once        Provider:  (Not yet assigned)    Completed FARHANA GALDAMEZ            Final Active Diagnoses:    Diagnosis Date Noted POA    A-fib [I48.91] 10/28/2024 Yes    Mixed  "hyperlipidemia [E78.2] 02/07/2022 Yes    Stage 3a chronic kidney disease [N18.31]  Yes    Essential hypertension [I10] 08/02/2016 Yes     Chronic    History of hemicolectomy [Z90.49] 05/17/2016 Not Applicable     Chronic    Type 2 diabetes mellitus without complication, with long-term current use of insulin [E11.9, Z79.4] 05/17/2016 Not Applicable     Chronic      Problems Resolved During this Admission:    Diagnosis Date Noted Date Resolved POA    PRINCIPAL PROBLEM:  Generalized abdominal pain [R10.84]  04/20/2025 Yes     Chronic      Discharged Condition: good    Disposition: Home or Self Care    Follow Up:   Follow-up Information       Pj Sanches MD Follow up.    Specialty: Internal Medicine  Why: Patient will be notified of a PCP hospital follow up appointment  Contact information:  [address] [id number] [phone]               Ulisses Horton MD. Schedule an appointment as soon as possible for a visit on 4/24/2025.    Specialties: General Surgery, Surgery  Why: at 1:00pm; rescheduled general surgery appointment  Contact information:  [address]  [address]  [place] [id number] [phone]                           Patient Instructions:      Ambulatory referral/consult to Outpatient Case Management   Referral Priority: Routine Referral Type: Consultation   Referral Reason: Specialty Services Required   Number of Visits Requested: 1     Medications:  Reconciled Home Medications:      Medication List        CONTINUE taking these medications      acetaminophen 500 MG tablet  Commonly known as: TYLENOL  Take 1 tablet (500 mg total) by mouth every 6 (six) hours as needed for Pain.     BD ONEIL 2ND GEN PEN NEEDLE 32 gauge x 5/32" Ndle  Generic drug: pen needle, diabetic  Inject 1 Needle into the skin 3 (three) times daily.     * blood sugar diagnostic Strp  100 each by Misc.(Non-Drug; Combo Route) route once daily.     * TRUE METRIX GLUCOSE TEST STRIP Strp  Generic drug: blood sugar " diagnostic  Inject 1 strip into the skin 3 (three) times daily.     carvedilol PHOSPHATE 40 MG ORAL CM24 40 MG Cm24  Commonly known as: COREG CR  Trosky tre cápsula (40 mg en total) por vía oral diariamente.  (Take 1 capsule (40 mg total) by mouth once daily.)     cephALEXin 500 MG capsule  Commonly known as: KEFLEX  Take 1 capsule (500 mg total) by mouth every 12 (twelve) hours. for 7 days     diclofenac sodium 1 % Gel  Commonly known as: VOLTAREN  Apply 2 g topically 4 (four) times daily.     docusate sodium 100 MG capsule  Commonly known as: COLACE  Take 1 capsule (100 mg total) by mouth 2 (two) times daily.     ELIQUIS 5 mg Tab  Generic drug: apixaban  Trosky tre tableta (5 mg en total) por vía oral 2 veces al día.  (Take 1 tablet (5 mg total) by mouth 2 (two) times daily.)     fenofibrate micronized 200 MG Cap  Commonly known as: LOFIBRA  Take 200 mg by mouth daily with breakfast.     JARDIANCE 25 mg tablet  Generic drug: empagliflozin  Trosky tre tableta (25 mg en total) por vía oral diariamente.  (Take 1 tablet (25 mg total) by mouth once daily.)     KERENDIA 10 mg Tab  Generic drug: finerenone  Trosky tre tableta por vía oral diariamente.  (Take 1 tablet by mouth once daily.)     LANTUS SOLOSTAR U-100 INSULIN 100 unit/mL (3 mL) Inpn pen  Generic drug: insulin glargine U-100 (Lantus)  Inject 25 Units into the skin 2 (two) times daily.     MOUNJARO 7.5 mg/0.5 mL Pnij  Generic drug: tirzepatide  Inject 7.5 mg into the skin every Sunday.     olmesartan 40 MG tablet  Commonly known as: BENICAR  Take 40 mg by mouth once daily.     ondansetron 4 MG Tbdl  Commonly known as: ZOFRAN-ODT  Take 1 tablet (4 mg total) by mouth every 6 (six) hours as needed.     pantoprazole 40 MG tablet  Commonly known as: PROTONIX  Take 1 tablet (40 mg total) by mouth once daily.     polyethylene glycol 17 gram Pwpk  Commonly known as: GLYCOLAX  Take 17 g by mouth once daily.     rosuvastatin 40 MG Tab  Commonly known as: CRESTOR  Take 40 mg by  mouth every evening.     sodium bicarbonate 650 MG tablet  Triana 1 tableta (650 mg en total) por vía oral 3 (ruiz) veces al día.  (Take 1 tablet (650 mg total) by mouth 3 (three) times daily.)     solifenacin 10 MG tablet  Commonly known as: VESICARE  Take 5 mg by mouth once daily.     triamcinolone acetonide 0.1% 0.1 % ointment  Commonly known as: KENALOG  Apply topically 2 (two) times daily.     TRUEPLUS LANCETS 30 gauge Misc  Generic drug: lancets  Inject 1 lancet  into the skin 3 (three) times daily.     VASCEPA ORAL  Take 1 mg by mouth.           * This list has 2 medication(s) that are the same as other medications prescribed for you. Read the directions carefully, and ask your doctor or other care provider to review them with you.                  Significant Diagnostic Studies: Labs: BMP:   Recent Labs   Lab 04/18/25  1009 04/19/25  0540 04/20/25  0617    137 139   K 3.8 3.2* 3.8    108 110   CO2 26 21* 20*   BUN 14 17 21   CREATININE 1.5* 1.4 1.3   CALCIUM 9.6 8.7 9.2   MG  --  1.6 1.5*   , CMP   Recent Labs   Lab 04/18/25  1009 04/19/25  0540 04/20/25  0617    137 139   K 3.8 3.2* 3.8    108 110   CO2 26 21* 20*   BUN 14 17 21   CREATININE 1.5* 1.4 1.3   CALCIUM 9.6 8.7 9.2   ALBUMIN 3.4* 2.9* 3.0*   BILITOT 0.6 0.2 0.2   ALKPHOS 53 48 51   AST 26 18 24   ALT 18 14 16   ANIONGAP 10 8 9   , and CBC   Recent Labs   Lab 04/18/25  1009 04/19/25  0540 04/20/25  0617   WBC 6.19 5.27 5.61   HGB 14.9 13.6 13.7   HCT 45.2 40.5 41.6    249 281       Pending Diagnostic Studies:       None          Indwelling Lines/Drains at time of discharge:   Lines/Drains/Airways       None                   Time spent on the discharge of patient: 30 minutes         Amari Fraire MD  Department of Hospital Medicine  [facility]

## 2025-04-20 NOTE — PROGRESS NOTES
AVS virtually reviewed with patient and , Paige #[id number], in its entirety with emphasis on diet, medications, follow-up appointments and reasons to return to the ED. Patient also encouraged to utilize their patient portal. Ease and convenience of use reiterated. Education complete and patient voiced understanding. All questions answered. Discharge teaching complete. CM provided information about meal delivery to AVS per patient request.

## 2025-04-20 NOTE — PLAN OF CARE
Problem: Adult Inpatient Plan of Care  Goal: Plan of Care Review  Outcome: Adequate for Care Transition  Goal: Patient-Specific Goal (Individualized)  Outcome: Adequate for Care Transition  Goal: Absence of Hospital-Acquired Illness or Injury  Outcome: Adequate for Care Transition  Goal: Optimal Comfort and Wellbeing  Outcome: Adequate for Care Transition  Goal: Readiness for Transition of Care  Outcome: Adequate for Care Transition     Problem: Diabetes Comorbidity  Goal: Blood Glucose Level Within Targeted Range  Outcome: Adequate for Care Transition     Problem: Acute Kidney Injury/Impairment  Goal: Fluid and Electrolyte Balance  Outcome: Adequate for Care Transition  Goal: Improved Oral Intake  Outcome: Adequate for Care Transition  Goal: Effective Renal Function  Outcome: Adequate for Care Transition     Problem: Pain Acute  Goal: Optimal Pain Control and Function  Outcome: Adequate for Care Transition     Problem: Intestinal Obstruction  Goal: Optimal Bowel Function  Outcome: Adequate for Care Transition  Goal: Fluid and Electrolyte Balance  Outcome: Adequate for Care Transition  Goal: Absence of Infection Signs and Symptoms  Outcome: Adequate for Care Transition  Goal: Optimize Nutrition Status  Outcome: Adequate for Care Transition  Goal: Optimal Pain Control and Function  Outcome: Adequate for Care Transition     Problem: Fall Injury Risk  Goal: Absence of Fall and Fall-Related Injury  Outcome: Adequate for Care Transition

## 2025-04-20 NOTE — PLAN OF CARE
Problem: Adult Inpatient Plan of Care  Goal: Plan of Care Review  Outcome: Progressing  Goal: Absence of Hospital-Acquired Illness or Injury  Outcome: Progressing  Goal: Optimal Comfort and Wellbeing  Outcome: Progressing     Problem: Intestinal Obstruction  Goal: Optimal Bowel Function  Outcome: Progressing  Goal: Absence of Infection Signs and Symptoms  Outcome: Progressing   Plan of care reviewed with patient. Questions answered. All safety measure implemented during shift.

## 2025-04-20 NOTE — PLAN OF CARE
Pt on RA, no respiratory distress noted. Will continue to monitor.   Problem: Adult Inpatient Plan of Care  Goal: Plan of Care Review  Outcome: Progressing

## 2025-04-21 ENCOUNTER — OFFICE VISIT (OUTPATIENT)
Dept: PRIMARY CARE CLINIC | Facility: CLINIC | Age: 75
End: 2025-04-21
Payer: MEDICARE

## 2025-04-21 VITALS
BODY MASS INDEX: 30.76 KG/M2 | HEIGHT: 62 IN | DIASTOLIC BLOOD PRESSURE: 96 MMHG | SYSTOLIC BLOOD PRESSURE: 145 MMHG | OXYGEN SATURATION: 96 % | WEIGHT: 167.13 LBS | HEART RATE: 83 BPM | TEMPERATURE: 98 F

## 2025-04-21 DIAGNOSIS — K52.9 ENTERITIS: Primary | ICD-10-CM

## 2025-04-21 PROBLEM — L02.211 ABSCESS OF ABDOMINAL WALL: Status: RESOLVED | Noted: 2017-07-11 | Resolved: 2025-04-21

## 2025-04-21 PROBLEM — N30.01 ACUTE CYSTITIS WITH HEMATURIA: Status: RESOLVED | Noted: 2023-12-20 | Resolved: 2025-04-21

## 2025-04-21 PROBLEM — N17.9 AKI (ACUTE KIDNEY INJURY): Status: RESOLVED | Noted: 2023-12-20 | Resolved: 2025-04-21

## 2025-04-21 PROBLEM — A49.8 INFECTION DUE TO ESBL-PRODUCING KLEBSIELLA PNEUMONIAE: Status: RESOLVED | Noted: 2020-07-05 | Resolved: 2025-04-21

## 2025-04-21 PROBLEM — L02.211 ABDOMINAL WALL ABSCESS: Status: RESOLVED | Noted: 2018-08-02 | Resolved: 2025-04-21

## 2025-04-21 PROBLEM — Z16.12 INFECTION DUE TO ESBL-PRODUCING KLEBSIELLA PNEUMONIAE: Status: RESOLVED | Noted: 2020-07-05 | Resolved: 2025-04-21

## 2025-04-21 PROBLEM — Z01.818 PREOPERATIVE CLEARANCE: Status: RESOLVED | Noted: 2022-02-07 | Resolved: 2025-04-21

## 2025-04-21 PROCEDURE — 99999 PR PBB SHADOW E&M-EST. PATIENT-LVL III: CPT | Mod: PBBFAC,,, | Performed by: INTERNAL MEDICINE

## 2025-04-21 RX ORDER — OMEPRAZOLE 40 MG/1
40 CAPSULE, DELAYED RELEASE ORAL DAILY
Start: 2025-04-21 | End: 2026-04-21

## 2025-04-21 NOTE — PROGRESS NOTES
[facility]   New Visit Progress Note   Recent Hospital Discharge     PRESENTING HISTORY     Chief Complaint/Reason for Admission:  Follow up Hospital Discharge   PCP: Pj Sanches MD    History of Present Illness:  Ms. Azucena Morrison is a 74 y.o. female who was recently admitted to the hospital.    [facility]  Discharge Summary        Patient Name: Azucena Morrison  MRN: [id number]  Admission Date: 4/15/2025  Hospital Length of Stay: 4 days  Discharge Date and Time: 04/20/2025 8:42 AM  Attending Physician: Amari Fraire MD   Discharging Provider: Amari Fraire MD  Discharge Provider Team: Networked reference to record PCT   Primary Care Provider: Pj Sanches MD  ___________________________________________________________________    Today:  [facility] to [facility] for [facility] hospital follow up.  Recently hospitalized for management of enteritis.  Additional concern for developing small bowel obstruction.  Admitted to [facility] service with General Surgery consultation.  Placed on bowel rest.  Antibiotics and anti emetics initiated.  Evaluated by General Surgery team and medical management recommended.  Patient responded well to supportive care.  Discharged to home to complete course of Keflex.    Patient accompanied today by minor child.  Ambulatory and independent with ADL's.  Brought medication bottles for review- EPIC med card updated.  Seen with assistance of professional .  No ABD pain.  Tolerating oral intake without NV.  Having regular, formed bowel movements.     Review of Systems  General ROS: negative for chills, fever or weight loss  Psychological ROS: negative for hallucination, depression or suicidal ideation  Ophthalmic ROS: negative for blurry vision, photophobia or eye pain  ENT ROS: negative for epistaxis, sore throat or rhinorrhea  Respiratory ROS: no cough, shortness of breath, or wheezing  Cardiovascular ROS:  no chest pain or dyspnea on exertion  Gastrointestinal ROS: no abdominal pain, change in bowel habits, or black/ bloody stools  Genito-Urinary ROS: no dysuria, trouble voiding, or hematuria  Musculoskeletal ROS: negative for gait disturbance or muscular weakness  Neurological ROS: no syncope or seizures; no ataxia  Dermatological ROS: negative for pruritis, rash and jaundice      PAST HISTORY:     Past Medical History:   Diagnosis Date    Chronic kidney disease, stage 3     Diabetes mellitus     Diabetes mellitus, type 2     Hematuria, unspecified     Hypercalcemia     Hypertension     Malnutrition of moderate degree 2020    Mixed hyperlipidemia 2022    Proteinuria     Renal cyst, left     UTI (urinary tract infection) 2020       Past Surgical History:   Procedure Laterality Date    ABDOMINAL SURGERY      CATHETERIZATION OF URETER Bilateral 2020    Procedure: CATHETERIZATION, URETER;  Surgeon: Kvng Cunningham MD;  Location: [address];  Service: Urology;  Laterality: Bilateral;     SECTION, CLASSIC      x2    CHOLECYSTECTOMY  1997    CLOSURE, ILEOSTOMY, OPEN N/A 2021    Procedure: CLOSURE, ILEOSTOMY, OPEN, ERAS low;  Surgeon: Fabiana Valiente MD;  Location: [address];  Service: Colon and Rectal;  Laterality: N/A;    COLON SURGERY      [facility]    COLONOSCOPY N/A 2018    Procedure: COLONOSCOPY;  Surgeon: Gibran Aguayo MD;  Location: [facility];  Service: Endoscopy;  Laterality: N/A;    COLOSTOMY Left     CYSTOSCOPY N/A 2020    Procedure: CYSTOSCOPY;  Surgeon: Kvng Cunningham MD;  Location: [address];  Service: Urology;  Laterality: N/A;    CYSTOSCOPY WITH URETEROSCOPY, RETROGRADE PYELOGRAPHY, AND INSERTION OF STENT Left 2019    Procedure: CYSTOSCOPY, WITH RETROGRADE PYELOGRAM AND URETERAL STENT INSERTION with placement of a muir cath;  Surgeon: Ulisses Horton MD;  Location: [facility];  Service: General;  Laterality: Left;     FLEXIBLE SIGMOIDOSCOPY N/A 2/9/2021    Procedure: SIGMOIDOSCOPY, FLEXIBLE;  Surgeon: Fabiana Valiente MD;  Location: [facility] OR 2ND FLR;  Service: Colon and Rectal;  Laterality: N/A;    HYSTEROSCOPY WITH DILATION AND CURETTAGE OF UTERUS N/A 12/9/2020    Procedure: HYSTEROSCOPY, WITH DILATION AND CURETTAGE OF UTERUS (MYOSURE);  Surgeon: Caitlyn Feng MD;  Location: [facility] OR;  Service: OB/GYN;  Laterality: N/A;  Guido notified 11/30,   video    HYSTEROSCOPY WITH DILATION AND CURETTAGE OF UTERUS N/A 3/4/2022    Procedure: HYSTEROSCOPY, WITH DILATION AND CURETTAGE OF UTERUS;  Surgeon: Caitlyn Feng MD;  Location: [facility] OR;  Service: OB/GYN;  Laterality: N/A;  Alexander notified CW 3/3  video confirmed    ILEOSTOMY  7/31/2020    Procedure: CREATION, ILEOSTOMY;  Surgeon: Fabiana Valiente MD;  [facility];  Service: Colon and Rectal;;    INCISION AND DRAINAGE OF ABSCESS N/A 12/22/2019    Procedure: INCISION AND DRAINAGE, ABSCESS;  Surgeon: Ulisses Horton MD;  Location: [facility] OR;  Service: General;  Laterality: N/A;    INCISION OF ABDOMINAL WALL N/A 8/10/2018    Procedure: INCISION, ABDOMINAL WALL;  Surgeon: Ulisses Horton MD;  Location: [facility] OR;  Service: General;  Laterality: N/A;    INCISIONAL HERNIA REPAIR Right 2010    LOW ANTERIOR RESECTION OF COLON N/A 7/31/2020    Procedure: RESECTION, COLON, LOW ANTERIOR;  Surgeon: Fabiana Valiente MD;  Location: [facility] OR 2ND FLR;  Service: Colon and Rectal;  Laterality: N/A;    LYSIS OF ADHESIONS  7/31/2020    Procedure: LYSIS, ADHESIONS;  Surgeon: Fabiana Valiente MD;  Location: [facility] OR 2ND FLR;  Service: Colon and Rectal;;    SURGICAL REMOVAL OF MASS OF LOWER EXTREMITY Bilateral 10/13/2023    Procedure: EXCISION, MASS ruthy ankle Malleolus;  Surgeon: Ulisses Horton MD;  Location: [facility] OR;  Service: General;  Laterality: Bilateral;       Family History   Problem Relation Name Age of Onset    Stroke Mother      Heart disease Mother       "Hyperlipidemia Mother      Hypertension Mother      Alcohol abuse Father      Stroke Sister      Diabetes Sister      Heart disease Sister      Hyperlipidemia Sister      Hypertension Sister      Diabetes Brother      Early death Grandchild      Anesthesia problems Neg Hx      Broken bones Neg Hx      Cancer Neg Hx           MEDICATIONS & ALLERGIES:     Medications Ordered Prior to Encounter[1]     Review of patient's allergies indicates:   Allergen Reactions    Chlorhexidine gluconate Rash     Chlorhexidine/alcohol wipes. Rash and blistering.       OBJECTIVE:     Vital Signs:  BP (!) 145/96 (BP Location: Left arm, Patient Position: Sitting)   Pulse 83   Temp 97.8 °F (36.6 °C) (Oral)   Ht 5' 2" (1.575 m)   Wt 75.8 kg (167 lb 1.7 oz)   LMP  (LMP Unknown)   SpO2 96%   BMI 30.56 kg/m²   Wt Readings from Last 3 Encounters:   04/21/25 1429 75.8 kg (167 lb 1.7 oz)   04/15/25 1859 78.8 kg (173 lb 11.6 oz)   04/15/25 0959 76.2 kg (168 lb)   04/13/25 1557 76.2 kg (168 lb)     Body mass index is 30.56 kg/m².        Physical Exam:  BP (!) 145/96 (BP Location: Left arm, Patient Position: Sitting)   Pulse 83   Temp 97.8 °F (36.6 °C) (Oral)   Ht 5' 2" (1.575 m)   Wt 75.8 kg (167 lb 1.7 oz)   LMP  (LMP Unknown)   SpO2 96%   BMI 30.56 kg/m²   General appearance: alert, cooperative, no distress  Constitutional:Oriented to person, place, and time  + appears well-developed and well-nourished.   HEENT: Normocephalic, atraumatic, neck symmetrical, no nasal discharge   Eyes: conjunctivae/corneas clear, PERRL, EOM's intact  Lungs: clear to auscultation bilaterally, no dullness to percussion bilaterally  Heart: regular rate and rhythm without rub; no displacement of the PMI   Abdomen: soft, non-tender; bowel sounds normoactive; no organomegaly  Extremities: extremities symmetric; no clubbing, cyanosis, or edema  Integument: Skin color, texture, turgor normal; no rashes; hair distrubution normal  Neurologic: Alert and oriented " X 3, normal strength, normal coordination and gait  Psychiatric: no pressured speech; normal affect; no evidence of impaired cognition     Laboratory  Lab Results   Component Value Date    WBC 5.61 04/20/2025    HGB 13.7 04/20/2025    HCT 41.6 04/20/2025    MCV 83 04/20/2025     04/20/2025     BMP  Lab Results   Component Value Date     04/20/2025    K 3.8 04/20/2025     04/20/2025    CO2 20 (L) 04/20/2025    BUN 21 04/20/2025    CREATININE 1.3 04/20/2025    CALCIUM 9.2 04/20/2025    ANIONGAP 9 04/20/2025    EGFRNORACEVR 44 (L) 04/20/2025     Lab Results   Component Value Date    ALT 16 04/20/2025    AST 24 04/20/2025    ALKPHOS 51 04/20/2025    BILITOT 0.2 04/20/2025     Lab Results   Component Value Date    INR 1.0 07/14/2020    INR 1.1 02/22/2019    INR 1.1 11/14/2016     Lab Results   Component Value Date    HGBA1C 8.6 (H) 04/15/2025       Diagnostic Results:    CT ABD Pelvis 4/15/25:  1. Moderate length of distal small bowel wall thickening with adjoining inflammatory changes, appearing new/progressed when compared to recent CT of 04/13/2025.  Mildly prominent caliber of small-bowel in this region without evidence of toby bowel obstruction.  Differential considerations would include infectious or noninfectious inflammatory enteritis, noting that developing mechanical small bowel obstruction would be difficult to exclude, given proximity of these loops to anastomotic suture material.  2. Additional details of chronic and incidental findings, as described in the body of the report.       ASSESSMENT & PLAN:         Enteritis  - recent hospitalization as above  - resolved with supportive care  - see General Surgery team tomorrow.     Patient will be released from hospital follow up clinic.  Records will be shared with PCP, Dr Pj Sanches, for coordination of care.       Instructions for the patient:      Scheduled Follow-up :  Future Appointments   Date Time Provider Department Center  "  4/22/2025  2:00 PM Ulisses Horton MD [facility]       Post Visit Medication List:     Medication List            Accurate as of April 21, 2025  2:44 PM. If you have any questions, ask your nurse or doctor.                START taking these medications      omeprazole 40 MG capsule  Commonly known as: PRILOSEC  Take 1 capsule (40 mg total) by mouth once daily.  Started by: Yolanda Guzmán MD            CONTINUE taking these medications      acetaminophen 500 MG tablet  Commonly known as: TYLENOL  Take 1 tablet (500 mg total) by mouth every 6 (six) hours as needed for Pain.     BD ONEIL 2ND GEN PEN NEEDLE 32 gauge x 5/32" Ndle  Generic drug: pen needle, diabetic  Inject 1 Needle into the skin 3 (three) times daily.     * blood sugar diagnostic Strp  100 each by Misc.(Non-Drug; Combo Route) route once daily.     * TRUE METRIX GLUCOSE TEST STRIP Strp  Generic drug: blood sugar diagnostic  Inject 1 strip into the skin 3 (three) times daily.     carvedilol PHOSPHATE 40 MG ORAL CM24 40 MG Cm24  Commonly known as: COREG CR  [place] tre cápsula (40 mg en total) por vía oral diariamente.  (Take 1 capsule (40 mg total) by mouth once daily.)     ELIQUIS 5 mg Tab  Generic drug: apixaban  [place] tre tableta (5 mg en total) por vía oral 2 veces al día.  (Take 1 tablet (5 mg total) by mouth 2 (two) times daily.)     fenofibrate micronized 200 MG Cap  Commonly known as: LOFIBRA     JARDIANCE 25 mg tablet  Generic drug: empagliflozin  [place] tre tableta (25 mg en total) por vía oral diariamente.  (Take 1 tablet (25 mg total) by mouth once daily.)     KERENDIA 10 mg Tab  Generic drug: finerenone  [place] tre tableta por vía oral diariamente.  (Take 1 tablet by mouth once daily.)     LANTUS SOLOSTAR U-100 INSULIN 100 unit/mL (3 mL) Inpn pen  Generic drug: insulin glargine U-100 (Lantus)  Inject 25 Units into the skin 2 (two) times daily.     MOUNJARO 7.5 mg/0.5 mL Pnij  Generic drug: tirzepatide  Inject 7.5 mg into the skin " "every .     ondansetron 4 MG Tbdl  Commonly known as: ZOFRAN-ODT  Take 1 tablet (4 mg total) by mouth every 6 (six) hours as needed.     rosuvastatin 40 MG Tab  Commonly known as: CRESTOR     sodium bicarbonate 650 MG tablet  [place] 1 tableta (650 mg en total) por vía oral 3 (ruiz) veces al día.  (Take 1 tablet (650 mg total) by mouth 3 (three) times daily.)     TRUEPLUS LANCETS 30 gauge Misc  Generic drug: lancets  Inject 1 lancet  into the skin 3 (three) times daily.     VASCEPA ORAL           * This list has 2 medication(s) that are the same as other medications prescribed for you. Read the directions carefully, and ask your doctor or other care provider to review them with you.                   Where to Get Your Medications        Information about where to get these medications is not yet available    Ask your nurse or doctor about these medications  omeprazole 40 MG capsule         Signing Physician:  Yolanda Guzmán MD         [1]   Current Outpatient Medications on File Prior to Visit   Medication Sig Dispense Refill    acetaminophen (TYLENOL) 500 MG tablet Take 1 tablet (500 mg total) by mouth every 6 (six) hours as needed for Pain. 20 tablet 0    apixaban (ELIQUIS) 5 mg Tab Take 1 tablet (5 mg total) by mouth 2 (two) times daily. 60 tablet 11    BD ONEIL 2ND GEN PEN NEEDLE 32 gauge x 5/32" Ndle Inject 1 Needle into the skin 3 (three) times daily. 200 each 11    blood sugar diagnostic Strp 100 each by Misc.(Non-Drug; Combo Route) route once daily. 100 each 2    carvedilol PHOSPHATE 40 MG ORAL CM24 (COREG CR) 40 MG CM24 Take 1 capsule (40 mg total) by mouth once daily. 30 capsule 11    [] cephALEXin (KEFLEX) 500 MG capsule Take 1 capsule (500 mg total) by mouth every 12 (twelve) hours. for 7 days 14 capsule 0    diclofenac sodium (VOLTAREN) 1 % Gel Apply 2 g topically 4 (four) times daily. 100 g 1    docusate sodium (COLACE) 100 MG capsule Take 1 capsule (100 mg total) by mouth 2 (two) times " daily. 60 capsule 0    empagliflozin (JARDIANCE) 25 mg tablet Take 1 tablet (25 mg total) by mouth once daily. 90 tablet 3    fenofibrate micronized (LOFIBRA) 200 MG Cap Take 200 mg by mouth daily with breakfast.      finerenone (KERENDIA) 10 mg Tab Take 1 tablet by mouth once daily. 30 tablet 11    icosapent ethyl (VASCEPA ORAL) Take 1 mg by mouth.      insulin glargine U-100, Lantus, 100 unit/mL (3 mL) SubQ InPn pen Inject 25 Units into the skin 2 (two) times daily. 15 mL 11    olmesartan (BENICAR) 40 MG tablet Take 40 mg by mouth once daily.      ondansetron (ZOFRAN-ODT) 4 MG TbDL Take 1 tablet (4 mg total) by mouth every 6 (six) hours as needed. 20 tablet 0    pantoprazole (PROTONIX) 40 MG tablet Take 1 tablet (40 mg total) by mouth once daily. 60 tablet 1    polyethylene glycol (GLYCOLAX) 17 gram PwPk Take 17 g by mouth once daily. 30 each 0    rosuvastatin (CRESTOR) 40 MG Tab Take 40 mg by mouth every evening.      sodium bicarbonate 650 MG tablet Take 1 tablet (650 mg total) by mouth 3 (three) times daily. 90 tablet 11    solifenacin (VESICARE) 10 MG tablet Take 5 mg by mouth once daily.      tirzepatide 7.5 mg/0.5 mL PnIj Inject 7.5 mg into the skin every Sunday. 2 mL 55    triamcinolone acetonide 0.1% (KENALOG) 0.1 % ointment Apply topically 2 (two) times daily. 30 g 0    TRUE METRIX GLUCOSE TEST STRIP Strp Inject 1 strip into the skin 3 (three) times daily. 200 strip 11    TRUEPLUS LANCETS 30 gauge Misc Inject 1 lancet  into the skin 3 (three) times daily. 200 each 11     No current facility-administered medications on file prior to visit.

## 2025-04-30 ENCOUNTER — PATIENT OUTREACH (OUTPATIENT)
Dept: ADMINISTRATIVE | Facility: OTHER | Age: 75
End: 2025-04-30
Payer: MEDICARE

## 2025-04-30 NOTE — PROGRESS NOTES
CHW - Outreach Attempt    Community Health Worker left a voicemail message for 1st attempt to contact patient regarding: Food and Utilities     Community Health Worker to attempt to contact patient on: 4/30/25

## 2025-05-01 ENCOUNTER — PATIENT OUTREACH (OUTPATIENT)
Dept: ADMINISTRATIVE | Facility: OTHER | Age: 75
End: 2025-05-01
Payer: MEDICARE

## 2025-05-01 NOTE — PROGRESS NOTES
CHW - Outreach Attempt    Community Health Worker left a voicemail message for 2nd attempt to contact patient regarding: Food and Utilities     Community Health Worker to attempt to contact patient on: 5/1/25

## 2025-05-12 ENCOUNTER — PATIENT OUTREACH (OUTPATIENT)
Dept: ADMINISTRATIVE | Facility: OTHER | Age: 75
End: 2025-05-12
Payer: MEDICARE

## (undated) DEVICE — SWAB CULTURETTE SINGLE

## (undated) DEVICE — SEE MEDLINE ITEM 157117

## (undated) DEVICE — TOWEL OR DISP STRL BLUE 4/PK

## (undated) DEVICE — COVER OVERHEAD SURG LT BLUE

## (undated) DEVICE — GLOVE BIOGEL ECLIPSE SZ 7.5

## (undated) DEVICE — STRIP PK IODOFORM CURITY 2X5YD

## (undated) DEVICE — PAD ABDOMINAL 5X9 STERILE

## (undated) DEVICE — SEE MEDLINE ITEM 152622

## (undated) DEVICE — NDL 22GA X1 1/2 REG BEVEL

## (undated) DEVICE — GOWN SURGICAL X-LARGE

## (undated) DEVICE — SUT 3/0 27IN PDS II VIO MO

## (undated) DEVICE — PACK BASIC

## (undated) DEVICE — JELLY LUBRICANT STERILE 4 OZ

## (undated) DEVICE — SUT PDS II 1 CT VIL MONO 36

## (undated) DEVICE — Device

## (undated) DEVICE — SEAL LENS SCOPE MYOSURE

## (undated) DEVICE — SEE MEDLINE ITEM 146345

## (undated) DEVICE — CONTAINER MULTIPURPOSE/SPECIME

## (undated) DEVICE — DRAPE ABDOMINAL TIBURON 14X11

## (undated) DEVICE — KIT DRESSING PREVENA PLUS

## (undated) DEVICE — SEE MEDLINE ITEM 154981

## (undated) DEVICE — PAD PREP 50/CA

## (undated) DEVICE — APPLICATOR CHLORAPREP ORN 26ML

## (undated) DEVICE — SEE MEDLINE ITEM 157148

## (undated) DEVICE — TRAY FOLEY 16FR INFECTION CONT

## (undated) DEVICE — DRAPE INCISE IOBAN 2 23X17IN

## (undated) DEVICE — SUT VICRYL CTD 2-0 GI 27 SH

## (undated) DEVICE — MANIFOLD 4 PORT

## (undated) DEVICE — TIE VAS SIL RUBBER MAXI BLU ST

## (undated) DEVICE — TAPE ADH MEDIPORE 4 X 10YDS

## (undated) DEVICE — MARKER SKIN STND TIP BLUE BARR

## (undated) DEVICE — SUT CTD VICRYL 3-0 CR/SH

## (undated) DEVICE — GLOVE SURGICAL LATEX SZ 6.5

## (undated) DEVICE — ELECTRODE REM PLYHSV RETURN 9

## (undated) DEVICE — SEE MEDLINE ITEM 157116

## (undated) DEVICE — GAUZE PACKING STRIP PLN 1/2X5

## (undated) DEVICE — NDL HYPO A BEVEL 22X1 1/2

## (undated) DEVICE — DRAPE LAP T SHT W/ INSTR PAD

## (undated) DEVICE — GOWN POLY REINF BRTH SLV LG

## (undated) DEVICE — GLOVE SURG BIOGEL LATEX SZ 7.5

## (undated) DEVICE — SEE MEDLINE ITEM 157131

## (undated) DEVICE — DEVICE MYOSURE LITE TISS REM

## (undated) DEVICE — SUT ETHILON 2-0 BLK PS-2

## (undated) DEVICE — CUTTER PROXIMATE BLUE 75MM

## (undated) DEVICE — SET IRR URLGY 2LINE UNIV SPIKE

## (undated) DEVICE — PANTIES FEMININE NAPKIN LG/XLG

## (undated) DEVICE — SEE MEDLINE ITEM 146417

## (undated) DEVICE — WIRE GD LUB STD 3CM .038 150CM

## (undated) DEVICE — SPONGE LAP 18X18 PREWASHED

## (undated) DEVICE — SEE MEDLINE ITEM 152487

## (undated) DEVICE — TUBING SUC UNIV W/CONN 12FT

## (undated) DEVICE — SUT MONOCRYL 4-0 PS-1 UND

## (undated) DEVICE — ADHESIVE DERMABOND ADVANCED

## (undated) DEVICE — SUT 2-0 ETHILON 18 FS

## (undated) DEVICE — SEE MEDLINE ITEM 156902

## (undated) DEVICE — GAUZE SPONGE 4X4 12PLY

## (undated) DEVICE — GAUZE PACKING STRIP PLN 1/4X5

## (undated) DEVICE — SUT CTD VICRYL VIL BR CR/SH

## (undated) DEVICE — SYR 10CC LUER LOCK

## (undated) DEVICE — DRESSING AQUACEL SACRAL 9 X 9

## (undated) DEVICE — DRESSING TELFA N ADH 3X8

## (undated) DEVICE — SUT VICRYL 3-0 27 SH

## (undated) DEVICE — SEE MEDLINE ITEM 156955

## (undated) DEVICE — STAPLER SKIN ROTATING HEAD

## (undated) DEVICE — GAUZE SPONGE 4X4 12 PLY NS

## (undated) DEVICE — STAPLER CONTOUR 40MM GREEN

## (undated) DEVICE — PACK PERI/GYN OPTIMA

## (undated) DEVICE — SUT SILK 3-0 SH 18IN BLACK

## (undated) DEVICE — SYR ONLY LUER LOCK 20CC

## (undated) DEVICE — STAPLER ECHELON PWR CIR 29MM

## (undated) DEVICE — BAG LINGEMAN DRAIN UROLOGY

## (undated) DEVICE — SUT 0 18IN COATED VICRYL V

## (undated) DEVICE — PACKING STRIP IDOFORM 1X5YD

## (undated) DEVICE — SUT 4-0 ETHILON 18 PS-2

## (undated) DEVICE — SUT 4/0 27IN PDS II VIO MON

## (undated) DEVICE — STRIP WOUND PK BIGUANIDE 36X.5

## (undated) DEVICE — KIT COLLECTION E SWAB REGULAR

## (undated) DEVICE — DRESSING XEROFORM GAUZE 5X9

## (undated) DEVICE — SUT PRLENE 1CT 1 BL MONO 30

## (undated) DEVICE — DRESSING TRANS 4X4 TEGADERM

## (undated) DEVICE — NDL BOX COUNTER

## (undated) DEVICE — SOL IRR NACL .9% 3000ML

## (undated) DEVICE — RELOAD PROXIMATE CUT BLUE 75MM

## (undated) DEVICE — SEALER LIGASURE IMPACT 18CM

## (undated) DEVICE — SUT SILK 2-0 SH 18IN BLACK

## (undated) DEVICE — SUT CTD VICRYL 2-0 VIL BR

## (undated) DEVICE — SUT COATED VICRYL 4/0 27IN

## (undated) DEVICE — SEE MEDLINE ITEM 157144

## (undated) DEVICE — DRAPE THYROID SOFT STERILE

## (undated) DEVICE — LEGGINGS 48X31 INCH

## (undated) DEVICE — CATH 5FR OPEN END URETERAL

## (undated) DEVICE — SEE MEDLINE ITEM 152764

## (undated) DEVICE — SEE MEDLINE ITEM 146355

## (undated) DEVICE — SEE MEDLINE ITEM 146347

## (undated) DEVICE — POUCH SENSURA MIO 3/8X2 1/8IN

## (undated) DEVICE — GAUZE SPONGE PEANUT STRL

## (undated) DEVICE — CONTAINER PATHOLOGY W/LID 32OZ

## (undated) DEVICE — GLOVE PROTEXIS HYDROGEL SZ7.5

## (undated) DEVICE — CATH URETERAL RUTNER 5 FR

## (undated) DEVICE — SYR B-D DISP CONTROL 10CC100/C

## (undated) DEVICE — STAPLER INT PROX TX 60X3.5MM

## (undated) DEVICE — SEE MEDLINE ITEM 157181

## (undated) DEVICE — BAG OSTOMY DRAINABLE 4IN FLNG

## (undated) DEVICE — DRESSING XEROFORM NONADH 1X8IN

## (undated) DEVICE — DRESSING XEROFORM FOIL PK 1X8

## (undated) DEVICE — SOL IRR WATER STRL 3000 ML

## (undated) DEVICE — WIRE GUIDE 0.038OLD

## (undated) DEVICE — WIRE GD LUB STD 3CM .035 150CM

## (undated) DEVICE — CATH URETHRAL 16FR RED

## (undated) DEVICE — TIE VAS SIL RUBBER MINI WHT ST

## (undated) DEVICE — SALINE .45% 1000ML VITEK2

## (undated) DEVICE — SUPPORT ULNA NERVE PROTECTOR

## (undated) DEVICE — DEVICE MYOSURE REACH SYS

## (undated) DEVICE — BOOT SUTURE AID

## (undated) DEVICE — PAD ABD 8X10 STERILE

## (undated) DEVICE — CATH IV INTROCAN 14G X 2.

## (undated) DEVICE — PACK SURGERY START

## (undated) DEVICE — SEE MEDLINE ITEM 152543

## (undated) DEVICE — SUT 2-0 12-18IN SILK

## (undated) DEVICE — SUT 1 48IN PDS II VIO MONO

## (undated) DEVICE — SEE MEDLINE ITEM 146313

## (undated) DEVICE — LUBRICANT SURGILUBE 2 OZ

## (undated) DEVICE — SUT 4-0 12-18IN SILK BLACK

## (undated) DEVICE — COVER LIGHT HANDLE 80/CA

## (undated) DEVICE — DRESSING GAUZE 6PLY 4X4

## (undated) DEVICE — SOL NS 1000CC

## (undated) DEVICE — GOWN SURGICAL XX LARGE X LONG

## (undated) DEVICE — PACK FLUENT DISPOSABLE

## (undated) DEVICE — CATH INTROCAN CANNULA 14GA X

## (undated) DEVICE — PACKAGE STERILIZING Z880

## (undated) DEVICE — GOWN POLY REINF BRTH SLV XL

## (undated) DEVICE — BLADE SURG CARBON STEEL #10

## (undated) DEVICE — CATH POLLACK OPEN-END FLEXI-TI

## (undated) DEVICE — ELECTRODE EXTENDED BLADE